# Patient Record
Sex: FEMALE | Race: WHITE | NOT HISPANIC OR LATINO | Employment: OTHER | URBAN - METROPOLITAN AREA
[De-identification: names, ages, dates, MRNs, and addresses within clinical notes are randomized per-mention and may not be internally consistent; named-entity substitution may affect disease eponyms.]

---

## 2017-01-04 ENCOUNTER — COMMUNICATION - HEALTHEAST (OUTPATIENT)
Dept: FAMILY MEDICINE | Facility: CLINIC | Age: 67
End: 2017-01-04

## 2017-01-04 ENCOUNTER — OFFICE VISIT - HEALTHEAST (OUTPATIENT)
Dept: FAMILY MEDICINE | Facility: CLINIC | Age: 67
End: 2017-01-04

## 2017-01-04 DIAGNOSIS — Z48.02 VISIT FOR SUTURE REMOVAL: ICD-10-CM

## 2017-01-10 ENCOUNTER — COMMUNICATION - HEALTHEAST (OUTPATIENT)
Dept: NURSING | Facility: CLINIC | Age: 67
End: 2017-01-10

## 2017-01-10 ENCOUNTER — COMMUNICATION - HEALTHEAST (OUTPATIENT)
Dept: FAMILY MEDICINE | Facility: CLINIC | Age: 67
End: 2017-01-10

## 2017-01-10 DIAGNOSIS — F33.9 MAJOR DEPRESSIVE DISORDER, RECURRENT EPISODE, UNSPECIFIED: ICD-10-CM

## 2017-01-10 DIAGNOSIS — E11.65 TYPE 2 DIABETES MELLITUS WITH HYPERGLYCEMIA (H): ICD-10-CM

## 2017-01-10 DIAGNOSIS — R63.4 LOSS OF WEIGHT: ICD-10-CM

## 2017-01-10 DIAGNOSIS — I48.91 ATRIAL FIBRILLATION (H): ICD-10-CM

## 2017-01-20 ENCOUNTER — OFFICE VISIT - HEALTHEAST (OUTPATIENT)
Dept: FAMILY MEDICINE | Facility: CLINIC | Age: 67
End: 2017-01-20

## 2017-01-20 DIAGNOSIS — M54.6 TRIGGER POINT OF THORACIC REGION: ICD-10-CM

## 2017-01-20 DIAGNOSIS — J44.0 CHRONIC OBSTRUCTIVE PULMONARY DISEASE WITH ACUTE LOWER RESPIRATORY INFECTION (H): ICD-10-CM

## 2017-01-20 DIAGNOSIS — G89.4 CHRONIC PAIN SYNDROME: ICD-10-CM

## 2017-01-20 DIAGNOSIS — M19.019 OSTEOARTHRITIS OF SHOULDER: ICD-10-CM

## 2017-01-20 DIAGNOSIS — J40 BRONCHITIS: ICD-10-CM

## 2017-01-22 ENCOUNTER — COMMUNICATION - HEALTHEAST (OUTPATIENT)
Dept: CARDIOLOGY | Facility: CLINIC | Age: 67
End: 2017-01-22

## 2017-01-22 ENCOUNTER — COMMUNICATION - HEALTHEAST (OUTPATIENT)
Dept: FAMILY MEDICINE | Facility: CLINIC | Age: 67
End: 2017-01-22

## 2017-01-22 DIAGNOSIS — I25.10 CORONARY ATHEROSCLEROSIS: ICD-10-CM

## 2017-01-22 DIAGNOSIS — K21.9 GERD (GASTROESOPHAGEAL REFLUX DISEASE): ICD-10-CM

## 2017-01-30 ENCOUNTER — COMMUNICATION - HEALTHEAST (OUTPATIENT)
Dept: FAMILY MEDICINE | Facility: CLINIC | Age: 67
End: 2017-01-30

## 2017-01-30 DIAGNOSIS — J44.1 COPD EXACERBATION (H): ICD-10-CM

## 2017-02-08 ENCOUNTER — COMMUNICATION - HEALTHEAST (OUTPATIENT)
Dept: FAMILY MEDICINE | Facility: CLINIC | Age: 67
End: 2017-02-08

## 2017-02-08 ENCOUNTER — COMMUNICATION - HEALTHEAST (OUTPATIENT)
Dept: NURSING | Facility: CLINIC | Age: 67
End: 2017-02-08

## 2017-02-08 DIAGNOSIS — I48.0 PAROXYSMAL ATRIAL FIBRILLATION (H): ICD-10-CM

## 2017-02-08 DIAGNOSIS — J44.0 CHRONIC OBSTRUCTIVE PULMONARY DISEASE WITH ACUTE LOWER RESPIRATORY INFECTION (H): ICD-10-CM

## 2017-02-12 ENCOUNTER — COMMUNICATION - HEALTHEAST (OUTPATIENT)
Dept: FAMILY MEDICINE | Facility: CLINIC | Age: 67
End: 2017-02-12

## 2017-02-12 DIAGNOSIS — R42 VERTIGO: ICD-10-CM

## 2017-02-13 ENCOUNTER — COMMUNICATION - HEALTHEAST (OUTPATIENT)
Dept: FAMILY MEDICINE | Facility: CLINIC | Age: 67
End: 2017-02-13

## 2017-02-13 DIAGNOSIS — J44.89 CHRONIC AIRWAY OBSTRUCTION, NOT ELSEWHERE CLASSIFIED: ICD-10-CM

## 2017-02-16 ENCOUNTER — COMMUNICATION - HEALTHEAST (OUTPATIENT)
Dept: FAMILY MEDICINE | Facility: CLINIC | Age: 67
End: 2017-02-16

## 2017-02-16 DIAGNOSIS — F33.9 MAJOR DEPRESSIVE DISORDER, RECURRENT EPISODE, UNSPECIFIED: ICD-10-CM

## 2017-02-19 ENCOUNTER — COMMUNICATION - HEALTHEAST (OUTPATIENT)
Dept: CARDIOLOGY | Facility: CLINIC | Age: 67
End: 2017-02-19

## 2017-02-19 ENCOUNTER — COMMUNICATION - HEALTHEAST (OUTPATIENT)
Dept: FAMILY MEDICINE | Facility: CLINIC | Age: 67
End: 2017-02-19

## 2017-02-19 DIAGNOSIS — I48.0 PAROXYSMAL ATRIAL FIBRILLATION (H): ICD-10-CM

## 2017-02-19 DIAGNOSIS — I25.10 CORONARY ATHEROSCLEROSIS: ICD-10-CM

## 2017-02-19 DIAGNOSIS — I48.92 ATRIAL FLUTTER, UNSPECIFIED TYPE (H): ICD-10-CM

## 2017-02-21 ENCOUNTER — OFFICE VISIT - HEALTHEAST (OUTPATIENT)
Dept: FAMILY MEDICINE | Facility: CLINIC | Age: 67
End: 2017-02-21

## 2017-02-21 ENCOUNTER — COMMUNICATION - HEALTHEAST (OUTPATIENT)
Dept: LAB | Facility: CLINIC | Age: 67
End: 2017-02-21

## 2017-02-21 DIAGNOSIS — E11.65 TYPE 2 DIABETES MELLITUS WITH HYPERGLYCEMIA (H): ICD-10-CM

## 2017-02-21 DIAGNOSIS — E87.6 HYPOPOTASSEMIA: ICD-10-CM

## 2017-02-21 DIAGNOSIS — G89.4 CHRONIC PAIN SYNDROME: ICD-10-CM

## 2017-02-21 DIAGNOSIS — I48.0 PAROXYSMAL ATRIAL FIBRILLATION (H): ICD-10-CM

## 2017-02-21 DIAGNOSIS — M54.6 TRIGGER POINT OF THORACIC REGION: ICD-10-CM

## 2017-02-21 DIAGNOSIS — I10 ESSENTIAL HYPERTENSION WITH GOAL BLOOD PRESSURE LESS THAN 140/90: ICD-10-CM

## 2017-02-21 DIAGNOSIS — E78.2 MIXED HYPERLIPIDEMIA: ICD-10-CM

## 2017-02-21 DIAGNOSIS — R35.0 URINARY FREQUENCY: ICD-10-CM

## 2017-02-21 LAB
CHOLEST SERPL-MCNC: 201 MG/DL
FASTING STATUS PATIENT QL REPORTED: YES
HBA1C MFR BLD: 7 % (ref 3.5–6)
HDLC SERPL-MCNC: 81 MG/DL
LDLC SERPL CALC-MCNC: 91 MG/DL
TRIGL SERPL-MCNC: 143 MG/DL

## 2017-02-27 ENCOUNTER — COMMUNICATION - HEALTHEAST (OUTPATIENT)
Dept: FAMILY MEDICINE | Facility: CLINIC | Age: 67
End: 2017-02-27

## 2017-03-07 ENCOUNTER — COMMUNICATION - HEALTHEAST (OUTPATIENT)
Dept: NURSING | Facility: CLINIC | Age: 67
End: 2017-03-07

## 2017-03-16 ENCOUNTER — AMBULATORY - HEALTHEAST (OUTPATIENT)
Dept: LAB | Facility: CLINIC | Age: 67
End: 2017-03-16

## 2017-03-16 ENCOUNTER — COMMUNICATION - HEALTHEAST (OUTPATIENT)
Dept: NURSING | Facility: CLINIC | Age: 67
End: 2017-03-16

## 2017-03-16 DIAGNOSIS — I48.0 PAROXYSMAL ATRIAL FIBRILLATION (H): ICD-10-CM

## 2017-03-16 DIAGNOSIS — I48.91 ATRIAL FIBRILLATION (H): ICD-10-CM

## 2017-03-19 ENCOUNTER — COMMUNICATION - HEALTHEAST (OUTPATIENT)
Dept: FAMILY MEDICINE | Facility: CLINIC | Age: 67
End: 2017-03-19

## 2017-03-19 DIAGNOSIS — F33.9 MAJOR DEPRESSIVE DISORDER, RECURRENT EPISODE, UNSPECIFIED: ICD-10-CM

## 2017-03-21 ENCOUNTER — OFFICE VISIT - HEALTHEAST (OUTPATIENT)
Dept: FAMILY MEDICINE | Facility: CLINIC | Age: 67
End: 2017-03-21

## 2017-03-21 ENCOUNTER — COMMUNICATION - HEALTHEAST (OUTPATIENT)
Dept: NURSING | Facility: CLINIC | Age: 67
End: 2017-03-21

## 2017-03-21 DIAGNOSIS — M79.7 FIBROMYALGIA: ICD-10-CM

## 2017-03-21 DIAGNOSIS — M25.519 SHOULDER PAIN: ICD-10-CM

## 2017-03-21 DIAGNOSIS — M54.6 TRIGGER POINT OF THORACIC REGION: ICD-10-CM

## 2017-03-21 DIAGNOSIS — G89.4 CHRONIC PAIN SYNDROME: ICD-10-CM

## 2017-03-21 DIAGNOSIS — I48.0 PAROXYSMAL ATRIAL FIBRILLATION (H): ICD-10-CM

## 2017-04-01 ENCOUNTER — RECORDS - HEALTHEAST (OUTPATIENT)
Dept: ADMINISTRATIVE | Facility: OTHER | Age: 67
End: 2017-04-01

## 2017-04-03 ENCOUNTER — RECORDS - HEALTHEAST (OUTPATIENT)
Dept: ADMINISTRATIVE | Facility: OTHER | Age: 67
End: 2017-04-03

## 2017-04-04 ENCOUNTER — COMMUNICATION - HEALTHEAST (OUTPATIENT)
Dept: FAMILY MEDICINE | Facility: CLINIC | Age: 67
End: 2017-04-04

## 2017-04-07 ENCOUNTER — COMMUNICATION - HEALTHEAST (OUTPATIENT)
Dept: FAMILY MEDICINE | Facility: CLINIC | Age: 67
End: 2017-04-07

## 2017-04-07 DIAGNOSIS — J44.89 CHRONIC AIRWAY OBSTRUCTION, NOT ELSEWHERE CLASSIFIED: ICD-10-CM

## 2017-04-07 DIAGNOSIS — J44.1 COPD EXACERBATION (H): ICD-10-CM

## 2017-04-07 DIAGNOSIS — F17.200 TOBACCO USE DISORDER: ICD-10-CM

## 2017-04-07 DIAGNOSIS — K27.9 PEPTIC ULCER: ICD-10-CM

## 2017-04-07 DIAGNOSIS — K21.00 REFLUX ESOPHAGITIS: ICD-10-CM

## 2017-04-11 ENCOUNTER — COMMUNICATION - HEALTHEAST (OUTPATIENT)
Dept: FAMILY MEDICINE | Facility: CLINIC | Age: 67
End: 2017-04-11

## 2017-04-11 DIAGNOSIS — I48.0 PAROXYSMAL ATRIAL FIBRILLATION (H): ICD-10-CM

## 2017-04-12 ENCOUNTER — COMMUNICATION - HEALTHEAST (OUTPATIENT)
Dept: FAMILY MEDICINE | Facility: CLINIC | Age: 67
End: 2017-04-12

## 2017-04-12 DIAGNOSIS — R60.9 EDEMA: ICD-10-CM

## 2017-04-19 ENCOUNTER — RECORDS - HEALTHEAST (OUTPATIENT)
Dept: ADMINISTRATIVE | Facility: OTHER | Age: 67
End: 2017-04-19

## 2017-04-19 ENCOUNTER — COMMUNICATION - HEALTHEAST (OUTPATIENT)
Dept: NURSING | Facility: CLINIC | Age: 67
End: 2017-04-19

## 2017-04-19 ENCOUNTER — OFFICE VISIT - HEALTHEAST (OUTPATIENT)
Dept: FAMILY MEDICINE | Facility: CLINIC | Age: 67
End: 2017-04-19

## 2017-04-19 DIAGNOSIS — I48.0 PAROXYSMAL ATRIAL FIBRILLATION (H): ICD-10-CM

## 2017-04-19 DIAGNOSIS — M16.0 PRIMARY OSTEOARTHRITIS OF BOTH HIPS: ICD-10-CM

## 2017-04-19 DIAGNOSIS — M54.6 TRIGGER POINT OF THORACIC REGION: ICD-10-CM

## 2017-04-19 DIAGNOSIS — M79.7 FIBROMYALGIA: ICD-10-CM

## 2017-04-19 DIAGNOSIS — M17.0 PRIMARY OSTEOARTHRITIS OF BOTH KNEES: ICD-10-CM

## 2017-04-19 DIAGNOSIS — G89.4 CHRONIC PAIN SYNDROME: ICD-10-CM

## 2017-04-23 ENCOUNTER — COMMUNICATION - HEALTHEAST (OUTPATIENT)
Dept: FAMILY MEDICINE | Facility: CLINIC | Age: 67
End: 2017-04-23

## 2017-04-23 DIAGNOSIS — F33.9 MAJOR DEPRESSIVE DISORDER, RECURRENT EPISODE, UNSPECIFIED: ICD-10-CM

## 2017-04-24 ENCOUNTER — COMMUNICATION - HEALTHEAST (OUTPATIENT)
Dept: FAMILY MEDICINE | Facility: CLINIC | Age: 67
End: 2017-04-24

## 2017-05-05 ENCOUNTER — COMMUNICATION - HEALTHEAST (OUTPATIENT)
Dept: FAMILY MEDICINE | Facility: CLINIC | Age: 67
End: 2017-05-05

## 2017-05-05 DIAGNOSIS — J44.0 CHRONIC OBSTRUCTIVE PULMONARY DISEASE WITH ACUTE LOWER RESPIRATORY INFECTION (H): ICD-10-CM

## 2017-05-05 DIAGNOSIS — E11.9 DM (DIABETES MELLITUS) (H): ICD-10-CM

## 2017-05-05 DIAGNOSIS — J44.89 CHRONIC AIRWAY OBSTRUCTION, NOT ELSEWHERE CLASSIFIED: ICD-10-CM

## 2017-05-05 DIAGNOSIS — K21.9 GERD (GASTROESOPHAGEAL REFLUX DISEASE): ICD-10-CM

## 2017-05-10 ENCOUNTER — RECORDS - HEALTHEAST (OUTPATIENT)
Dept: ADMINISTRATIVE | Facility: OTHER | Age: 67
End: 2017-05-10

## 2017-05-10 ENCOUNTER — COMMUNICATION - HEALTHEAST (OUTPATIENT)
Dept: FAMILY MEDICINE | Facility: CLINIC | Age: 67
End: 2017-05-10

## 2017-05-19 ENCOUNTER — COMMUNICATION - HEALTHEAST (OUTPATIENT)
Dept: INTERNAL MEDICINE | Facility: CLINIC | Age: 67
End: 2017-05-19

## 2017-05-19 ENCOUNTER — OFFICE VISIT - HEALTHEAST (OUTPATIENT)
Dept: FAMILY MEDICINE | Facility: CLINIC | Age: 67
End: 2017-05-19

## 2017-05-19 ENCOUNTER — RECORDS - HEALTHEAST (OUTPATIENT)
Dept: ADMINISTRATIVE | Facility: OTHER | Age: 67
End: 2017-05-19

## 2017-05-19 DIAGNOSIS — M54.6 TRIGGER POINT OF THORACIC REGION: ICD-10-CM

## 2017-05-19 DIAGNOSIS — R42 VERTIGO: ICD-10-CM

## 2017-05-19 DIAGNOSIS — I48.0 PAROXYSMAL ATRIAL FIBRILLATION (H): ICD-10-CM

## 2017-05-19 DIAGNOSIS — E11.40 DM NEUROPATHY, TYPE II DIABETES MELLITUS (H): ICD-10-CM

## 2017-05-19 DIAGNOSIS — G89.4 CHRONIC PAIN SYNDROME: ICD-10-CM

## 2017-05-19 ASSESSMENT — MIFFLIN-ST. JEOR: SCORE: 1449.15

## 2017-05-22 ENCOUNTER — RECORDS - HEALTHEAST (OUTPATIENT)
Dept: ADMINISTRATIVE | Facility: OTHER | Age: 67
End: 2017-05-22

## 2017-05-23 ENCOUNTER — COMMUNICATION - HEALTHEAST (OUTPATIENT)
Dept: FAMILY MEDICINE | Facility: CLINIC | Age: 67
End: 2017-05-23

## 2017-05-23 ENCOUNTER — RECORDS - HEALTHEAST (OUTPATIENT)
Dept: ADMINISTRATIVE | Facility: OTHER | Age: 67
End: 2017-05-23

## 2017-05-23 ENCOUNTER — OFFICE VISIT - HEALTHEAST (OUTPATIENT)
Dept: CARDIOLOGY | Facility: CLINIC | Age: 67
End: 2017-05-23

## 2017-05-23 DIAGNOSIS — I48.0 PAROXYSMAL ATRIAL FIBRILLATION (H): ICD-10-CM

## 2017-05-23 DIAGNOSIS — I10 ESSENTIAL HYPERTENSION WITH GOAL BLOOD PRESSURE LESS THAN 140/90: ICD-10-CM

## 2017-05-23 DIAGNOSIS — I35.0 MODERATE AORTIC STENOSIS: ICD-10-CM

## 2017-05-23 DIAGNOSIS — J44.9 COPD (CHRONIC OBSTRUCTIVE PULMONARY DISEASE) (H): ICD-10-CM

## 2017-05-23 DIAGNOSIS — I35.9 AORTIC VALVE DISORDER: ICD-10-CM

## 2017-05-23 DIAGNOSIS — I48.92 ATRIAL FLUTTER, UNSPECIFIED TYPE (H): ICD-10-CM

## 2017-05-23 ASSESSMENT — MIFFLIN-ST. JEOR: SCORE: 1433.28

## 2017-05-31 ENCOUNTER — COMMUNICATION - HEALTHEAST (OUTPATIENT)
Dept: FAMILY MEDICINE | Facility: CLINIC | Age: 67
End: 2017-05-31

## 2017-06-02 ENCOUNTER — COMMUNICATION - HEALTHEAST (OUTPATIENT)
Dept: FAMILY MEDICINE | Facility: CLINIC | Age: 67
End: 2017-06-02

## 2017-06-02 DIAGNOSIS — I48.0 PAROXYSMAL ATRIAL FIBRILLATION (H): ICD-10-CM

## 2017-06-09 ENCOUNTER — COMMUNICATION - HEALTHEAST (OUTPATIENT)
Dept: FAMILY MEDICINE | Facility: CLINIC | Age: 67
End: 2017-06-09

## 2017-06-09 ENCOUNTER — COMMUNICATION - HEALTHEAST (OUTPATIENT)
Dept: NURSING | Facility: CLINIC | Age: 67
End: 2017-06-09

## 2017-06-09 DIAGNOSIS — E11.40 DM NEUROPATHY, TYPE II DIABETES MELLITUS (H): ICD-10-CM

## 2017-06-09 DIAGNOSIS — E11.9 TYPE 2 DIABETES MELLITUS (H): ICD-10-CM

## 2017-06-11 ENCOUNTER — COMMUNICATION - HEALTHEAST (OUTPATIENT)
Dept: FAMILY MEDICINE | Facility: CLINIC | Age: 67
End: 2017-06-11

## 2017-06-11 DIAGNOSIS — Z78.0 MENOPAUSE: ICD-10-CM

## 2017-06-13 ENCOUNTER — COMMUNICATION - HEALTHEAST (OUTPATIENT)
Dept: FAMILY MEDICINE | Facility: CLINIC | Age: 67
End: 2017-06-13

## 2017-06-13 DIAGNOSIS — E11.9 TYPE 2 DIABETES MELLITUS (H): ICD-10-CM

## 2017-06-19 ENCOUNTER — COMMUNICATION - HEALTHEAST (OUTPATIENT)
Dept: NURSING | Facility: CLINIC | Age: 67
End: 2017-06-19

## 2017-06-19 ENCOUNTER — OFFICE VISIT - HEALTHEAST (OUTPATIENT)
Dept: FAMILY MEDICINE | Facility: CLINIC | Age: 67
End: 2017-06-19

## 2017-06-19 DIAGNOSIS — I48.91 ATRIAL FIBRILLATION (H): ICD-10-CM

## 2017-06-19 DIAGNOSIS — M54.6 TRIGGER POINT OF THORACIC REGION: ICD-10-CM

## 2017-06-19 DIAGNOSIS — I48.0 PAROXYSMAL ATRIAL FIBRILLATION (H): ICD-10-CM

## 2017-06-19 DIAGNOSIS — J44.89 CHRONIC AIRWAY OBSTRUCTION, NOT ELSEWHERE CLASSIFIED: ICD-10-CM

## 2017-06-19 DIAGNOSIS — K21.00 REFLUX ESOPHAGITIS: ICD-10-CM

## 2017-06-19 DIAGNOSIS — K21.9 GERD (GASTROESOPHAGEAL REFLUX DISEASE): ICD-10-CM

## 2017-06-19 DIAGNOSIS — G89.4 CHRONIC PAIN SYNDROME: ICD-10-CM

## 2017-06-19 DIAGNOSIS — E11.9 TYPE 2 DIABETES MELLITUS (H): ICD-10-CM

## 2017-06-23 ENCOUNTER — COMMUNICATION - HEALTHEAST (OUTPATIENT)
Dept: FAMILY MEDICINE | Facility: CLINIC | Age: 67
End: 2017-06-23

## 2017-06-23 DIAGNOSIS — I48.91 ATRIAL FIBRILLATION (H): ICD-10-CM

## 2017-07-13 ENCOUNTER — COMMUNICATION - HEALTHEAST (OUTPATIENT)
Dept: FAMILY MEDICINE | Facility: CLINIC | Age: 67
End: 2017-07-13

## 2017-07-13 DIAGNOSIS — E11.40 DM NEUROPATHY, TYPE II DIABETES MELLITUS (H): ICD-10-CM

## 2017-07-18 ENCOUNTER — COMMUNICATION - HEALTHEAST (OUTPATIENT)
Dept: FAMILY MEDICINE | Facility: CLINIC | Age: 67
End: 2017-07-18

## 2017-07-18 DIAGNOSIS — E11.40 DM NEUROPATHY, TYPE II DIABETES MELLITUS (H): ICD-10-CM

## 2017-07-19 ENCOUNTER — OFFICE VISIT - HEALTHEAST (OUTPATIENT)
Dept: FAMILY MEDICINE | Facility: CLINIC | Age: 67
End: 2017-07-19

## 2017-07-19 ENCOUNTER — COMMUNICATION - HEALTHEAST (OUTPATIENT)
Dept: NURSING | Facility: CLINIC | Age: 67
End: 2017-07-19

## 2017-07-19 DIAGNOSIS — I48.0 PAROXYSMAL ATRIAL FIBRILLATION (H): ICD-10-CM

## 2017-07-19 DIAGNOSIS — M54.6 TRIGGER POINT OF THORACIC REGION: ICD-10-CM

## 2017-07-19 DIAGNOSIS — M21.619 BUNION: ICD-10-CM

## 2017-07-19 DIAGNOSIS — G89.4 CHRONIC PAIN SYNDROME: ICD-10-CM

## 2017-07-19 DIAGNOSIS — L84 CALLUS OF FOOT: ICD-10-CM

## 2017-07-19 DIAGNOSIS — I48.91 ATRIAL FIBRILLATION (H): ICD-10-CM

## 2017-07-19 DIAGNOSIS — E11.40 DM NEUROPATHY, TYPE II DIABETES MELLITUS (H): ICD-10-CM

## 2017-07-19 DIAGNOSIS — H10.31 ACUTE BACTERIAL CONJUNCTIVITIS OF RIGHT EYE: ICD-10-CM

## 2017-07-19 DIAGNOSIS — J44.9 COPD (CHRONIC OBSTRUCTIVE PULMONARY DISEASE) (H): ICD-10-CM

## 2017-08-08 ENCOUNTER — COMMUNICATION - HEALTHEAST (OUTPATIENT)
Dept: FAMILY MEDICINE | Facility: CLINIC | Age: 67
End: 2017-08-08

## 2017-08-08 DIAGNOSIS — J44.0 CHRONIC OBSTRUCTIVE PULMONARY DISEASE WITH ACUTE LOWER RESPIRATORY INFECTION (H): ICD-10-CM

## 2017-08-21 ENCOUNTER — RECORDS - HEALTHEAST (OUTPATIENT)
Dept: ADMINISTRATIVE | Facility: OTHER | Age: 67
End: 2017-08-21

## 2017-08-21 ENCOUNTER — COMMUNICATION - HEALTHEAST (OUTPATIENT)
Dept: FAMILY MEDICINE | Facility: CLINIC | Age: 67
End: 2017-08-21

## 2017-08-22 ENCOUNTER — OFFICE VISIT - HEALTHEAST (OUTPATIENT)
Dept: FAMILY MEDICINE | Facility: CLINIC | Age: 67
End: 2017-08-22

## 2017-08-22 ENCOUNTER — COMMUNICATION - HEALTHEAST (OUTPATIENT)
Dept: INTERNAL MEDICINE | Facility: CLINIC | Age: 67
End: 2017-08-22

## 2017-08-22 DIAGNOSIS — I48.91 ATRIAL FIBRILLATION (H): ICD-10-CM

## 2017-08-22 DIAGNOSIS — M54.6 TRIGGER POINT OF THORACIC REGION: ICD-10-CM

## 2017-08-22 DIAGNOSIS — Z12.31 VISIT FOR SCREENING MAMMOGRAM: ICD-10-CM

## 2017-08-22 DIAGNOSIS — G89.4 CHRONIC PAIN SYNDROME: ICD-10-CM

## 2017-08-22 DIAGNOSIS — I48.0 PAROXYSMAL ATRIAL FIBRILLATION (H): ICD-10-CM

## 2017-08-22 ASSESSMENT — MIFFLIN-ST. JEOR: SCORE: 1419.67

## 2017-08-31 ENCOUNTER — COMMUNICATION - HEALTHEAST (OUTPATIENT)
Dept: FAMILY MEDICINE | Facility: CLINIC | Age: 67
End: 2017-08-31

## 2017-08-31 ENCOUNTER — HOSPITAL ENCOUNTER (OUTPATIENT)
Dept: CARDIOLOGY | Facility: CLINIC | Age: 67
Discharge: HOME | End: 2017-08-31
Attending: NURSE PRACTITIONER

## 2017-08-31 DIAGNOSIS — I35.9 AORTIC VALVE DISORDER: ICD-10-CM

## 2017-08-31 LAB
AORTIC ROOT: 3.3 CM
AORTIC VALVE MEAN VELOCITY: 248 CM/S
AR DECEL SLOPE: 3980 MM/S2
AR PEAK VELOCITY: 454 CM/S
AV DIMENSIONLESS INDEX VTI: 0.3
AV MEAN GRADIENT: 28 MMHG
AV PEAK GRADIENT: 45.7 MMHG
AV REGURGITANT PEAK GRADIENT: 82.4 MMHG
AV REGURGITATION PRESSURE HALF TIME: 343 MS
AV VALVE AREA: 0.9 CM2
AV VELOCITY RATIO: 0.4
BSA FOR ECHO PROCEDURE: 2.03 M2
CV BLOOD PRESSURE: NORMAL MMHG
CV ECHO HEIGHT: 64.5 IN
CV ECHO WEIGHT: 201 LBS
DOP CALC AO PEAK VEL: 338 CM/S
DOP CALC AO VTI: 84.7 CM
DOP CALC LVOT AREA: 3.14 CM2
DOP CALC LVOT DIAMETER: 2 CM
DOP CALC LVOT PEAK VEL: 127 CM/S
DOP CALC LVOT STROKE VOLUME: 77.2 CM3
DOP CALC MV VTI: 32.9 CM
DOP CALCLVOT PEAK VEL VTI: 24.6 CM
EJECTION FRACTION: 60 % (ref 55–75)
FRACTIONAL SHORTENING: 35.4 % (ref 28–44)
INTERVENTRICULAR SEPTUM IN END DIASTOLE: 1.61 CM (ref 0.6–0.9)
IVS/PW RATIO: 1.8
LA AREA 2: 16.9 CM2
LEFT ATRIUM LENGTH: 4.9 CM
LEFT ATRIUM SIZE: 3.7 CM
LEFT VENTRICLE CARDIAC INDEX: 3.3 L/MIN/M2
LEFT VENTRICLE CARDIAC OUTPUT: 6.6 L/MIN
LEFT VENTRICLE DIASTOLIC VOLUME INDEX: 44.8 CM3/M2 (ref 34–74)
LEFT VENTRICLE DIASTOLIC VOLUME: 91 CM3 (ref 46–106)
LEFT VENTRICLE HEART RATE: 86 BPM
LEFT VENTRICLE MASS INDEX: 98.9 G/M2
LEFT VENTRICLE SYSTOLIC VOLUME INDEX: 17.7 CM3/M2 (ref 11–31)
LEFT VENTRICLE SYSTOLIC VOLUME: 36 CM3 (ref 14–42)
LEFT VENTRICULAR INTERNAL DIMENSION IN DIASTOLE: 4.32 CM (ref 3.8–5.2)
LEFT VENTRICULAR INTERNAL DIMENSION IN SYSTOLE: 2.79 CM (ref 2.2–3.5)
LEFT VENTRICULAR MASS: 200.8 G
LEFT VENTRICULAR OUTFLOW TRACT MEAN GRADIENT: 4 MMHG
LEFT VENTRICULAR OUTFLOW TRACT MEAN VELOCITY: 95.7 CM/S
LEFT VENTRICULAR OUTFLOW TRACT PEAK GRADIENT: 6 MMHG
LEFT VENTRICULAR POSTERIOR WALL IN END DIASTOLE: 0.92 CM (ref 0.6–0.9)
LV STROKE VOLUME INDEX: 38.1 ML/M2
MITRAL VALVE DECELERATION SLOPE: 4670 MM/S2
MITRAL VALVE E/A RATIO: 1.1
MITRAL VALVE MEAN INFLOW VELOCITY: 107 CM/S
MITRAL VALVE PEAK VELOCITY: 149 CM/S
MITRAL VALVE PRESSURE HALF-TIME: 95 MS
MV AREA VTI: 2.35 CM2
MV AVERAGE E/E' RATIO: 13.1 CM/S
MV DECELERATION TIME: 169 MS
MV E'TISSUE VEL-LAT: 8.29 CM/S
MV E'TISSUE VEL-MED: 6.92 CM/S
MV LATERAL E/E' RATIO: 12
MV MEDIAL E/E' RATIO: 14.4
MV PEAK A VELOCITY: 88.3 CM/S
MV PEAK E VELOCITY: 99.8 CM/S
MV PEAK GRADIENT: 8.9 MMHG
MV VALVE AREA BY CONTINUITY EQUATION: 2.3 CM2
MV VALVE AREA PRESSURE 1/2 METHOD: 2.3 CM2
NUC REST DIASTOLIC VOLUME INDEX: 3216 LBS
NUC REST SYSTOLIC VOLUME INDEX: 64.5 IN
TRICUSPID VALVE ANULAR PLANE SYSTOLIC EXCURSION: 2 CM

## 2017-08-31 ASSESSMENT — MIFFLIN-ST. JEOR: SCORE: 1419.67

## 2017-09-06 ENCOUNTER — COMMUNICATION - HEALTHEAST (OUTPATIENT)
Dept: CARDIOLOGY | Facility: CLINIC | Age: 67
End: 2017-09-06

## 2017-09-12 ENCOUNTER — COMMUNICATION - HEALTHEAST (OUTPATIENT)
Dept: CARDIOLOGY | Facility: CLINIC | Age: 67
End: 2017-09-12

## 2017-09-12 DIAGNOSIS — I48.92 ATRIAL FLUTTER, UNSPECIFIED TYPE (H): ICD-10-CM

## 2017-09-22 ENCOUNTER — AMBULATORY - HEALTHEAST (OUTPATIENT)
Dept: FAMILY MEDICINE | Facility: CLINIC | Age: 67
End: 2017-09-22

## 2017-09-22 ENCOUNTER — COMMUNICATION - HEALTHEAST (OUTPATIENT)
Dept: NURSING | Facility: CLINIC | Age: 67
End: 2017-09-22

## 2017-09-22 ENCOUNTER — OFFICE VISIT - HEALTHEAST (OUTPATIENT)
Dept: FAMILY MEDICINE | Facility: CLINIC | Age: 67
End: 2017-09-22

## 2017-09-22 DIAGNOSIS — E11.40 TYPE 2 DIABETES MELLITUS WITH DIABETIC NEUROPATHY, WITHOUT LONG-TERM CURRENT USE OF INSULIN (H): ICD-10-CM

## 2017-09-22 DIAGNOSIS — I48.0 PAROXYSMAL ATRIAL FIBRILLATION (H): ICD-10-CM

## 2017-09-22 DIAGNOSIS — J44.1 COPD EXACERBATION (H): ICD-10-CM

## 2017-09-22 DIAGNOSIS — I48.91 ATRIAL FIBRILLATION (H): ICD-10-CM

## 2017-09-22 DIAGNOSIS — M54.6 TRIGGER POINT OF THORACIC REGION: ICD-10-CM

## 2017-09-22 DIAGNOSIS — G89.4 CHRONIC PAIN SYNDROME: ICD-10-CM

## 2017-09-22 DIAGNOSIS — M25.519 SHOULDER PAIN: ICD-10-CM

## 2017-09-22 DIAGNOSIS — M79.7 FIBROMYALGIA: ICD-10-CM

## 2017-09-25 ENCOUNTER — COMMUNICATION - HEALTHEAST (OUTPATIENT)
Dept: FAMILY MEDICINE | Facility: CLINIC | Age: 67
End: 2017-09-25

## 2017-10-05 ENCOUNTER — COMMUNICATION - HEALTHEAST (OUTPATIENT)
Dept: FAMILY MEDICINE | Facility: CLINIC | Age: 67
End: 2017-10-05

## 2017-10-05 DIAGNOSIS — R10.13 EPIGASTRIC ABDOMINAL PAIN: ICD-10-CM

## 2017-10-05 DIAGNOSIS — A04.8 H. PYLORI INFECTION: ICD-10-CM

## 2017-10-10 ENCOUNTER — AMBULATORY - HEALTHEAST (OUTPATIENT)
Dept: FAMILY MEDICINE | Facility: CLINIC | Age: 67
End: 2017-10-10

## 2017-10-10 ENCOUNTER — COMMUNICATION - HEALTHEAST (OUTPATIENT)
Dept: FAMILY MEDICINE | Facility: CLINIC | Age: 67
End: 2017-10-10

## 2017-10-10 DIAGNOSIS — R10.13 EPIGASTRIC ABDOMINAL PAIN: ICD-10-CM

## 2017-10-10 DIAGNOSIS — I48.0 PAROXYSMAL ATRIAL FIBRILLATION (H): ICD-10-CM

## 2017-10-10 DIAGNOSIS — R60.9 EDEMA: ICD-10-CM

## 2017-10-20 ENCOUNTER — COMMUNICATION - HEALTHEAST (OUTPATIENT)
Dept: NURSING | Facility: CLINIC | Age: 67
End: 2017-10-20

## 2017-10-20 ENCOUNTER — OFFICE VISIT - HEALTHEAST (OUTPATIENT)
Dept: FAMILY MEDICINE | Facility: CLINIC | Age: 67
End: 2017-10-20

## 2017-10-20 DIAGNOSIS — I48.0 PAROXYSMAL ATRIAL FIBRILLATION (H): ICD-10-CM

## 2017-10-20 DIAGNOSIS — Z12.39 BREAST CANCER SCREENING: ICD-10-CM

## 2017-10-20 DIAGNOSIS — M79.7 FIBROMYALGIA: ICD-10-CM

## 2017-10-20 DIAGNOSIS — I48.91 ATRIAL FIBRILLATION (H): ICD-10-CM

## 2017-10-20 DIAGNOSIS — G89.4 CHRONIC PAIN SYNDROME: ICD-10-CM

## 2017-10-20 DIAGNOSIS — Z12.31 SCREENING MAMMOGRAM, ENCOUNTER FOR: ICD-10-CM

## 2017-11-06 ENCOUNTER — AMBULATORY - HEALTHEAST (OUTPATIENT)
Dept: FAMILY MEDICINE | Facility: CLINIC | Age: 67
End: 2017-11-06

## 2017-11-06 DIAGNOSIS — H26.9 CATARACT: ICD-10-CM

## 2017-11-07 ENCOUNTER — COMMUNICATION - HEALTHEAST (OUTPATIENT)
Dept: FAMILY MEDICINE | Facility: CLINIC | Age: 67
End: 2017-11-07

## 2017-11-07 DIAGNOSIS — E78.2 MIXED HYPERLIPIDEMIA: ICD-10-CM

## 2017-11-17 ENCOUNTER — OFFICE VISIT - HEALTHEAST (OUTPATIENT)
Dept: FAMILY MEDICINE | Facility: CLINIC | Age: 67
End: 2017-11-17

## 2017-11-17 ENCOUNTER — COMMUNICATION - HEALTHEAST (OUTPATIENT)
Dept: NURSING | Facility: CLINIC | Age: 67
End: 2017-11-17

## 2017-11-17 DIAGNOSIS — M79.7 FIBROMYALGIA: ICD-10-CM

## 2017-11-17 DIAGNOSIS — I48.0 PAROXYSMAL ATRIAL FIBRILLATION (H): ICD-10-CM

## 2017-11-17 DIAGNOSIS — M25.519 SHOULDER PAIN: ICD-10-CM

## 2017-11-17 DIAGNOSIS — I48.91 ATRIAL FIBRILLATION (H): ICD-10-CM

## 2017-11-17 DIAGNOSIS — G89.4 CHRONIC PAIN SYNDROME: ICD-10-CM

## 2017-11-17 ASSESSMENT — MIFFLIN-ST. JEOR: SCORE: 1402.66

## 2017-11-29 ENCOUNTER — RECORDS - HEALTHEAST (OUTPATIENT)
Dept: ADMINISTRATIVE | Facility: OTHER | Age: 67
End: 2017-11-29

## 2017-12-08 ENCOUNTER — COMMUNICATION - HEALTHEAST (OUTPATIENT)
Dept: NURSING | Facility: CLINIC | Age: 67
End: 2017-12-08

## 2017-12-11 ENCOUNTER — COMMUNICATION - HEALTHEAST (OUTPATIENT)
Dept: FAMILY MEDICINE | Facility: CLINIC | Age: 67
End: 2017-12-11

## 2017-12-11 DIAGNOSIS — R42 VERTIGO: ICD-10-CM

## 2017-12-13 ENCOUNTER — RECORDS - HEALTHEAST (OUTPATIENT)
Dept: ADMINISTRATIVE | Facility: OTHER | Age: 67
End: 2017-12-13

## 2017-12-19 ENCOUNTER — OFFICE VISIT - HEALTHEAST (OUTPATIENT)
Dept: FAMILY MEDICINE | Facility: CLINIC | Age: 67
End: 2017-12-19

## 2017-12-19 ENCOUNTER — COMMUNICATION - HEALTHEAST (OUTPATIENT)
Dept: NURSING | Facility: CLINIC | Age: 67
End: 2017-12-19

## 2017-12-19 DIAGNOSIS — I35.0 MODERATE AORTIC STENOSIS: ICD-10-CM

## 2017-12-19 DIAGNOSIS — I48.0 PAROXYSMAL ATRIAL FIBRILLATION (H): ICD-10-CM

## 2017-12-19 DIAGNOSIS — B37.2 CANDIDAL INTERTRIGO: ICD-10-CM

## 2017-12-19 DIAGNOSIS — E11.65 TYPE 2 DIABETES MELLITUS WITH HYPERGLYCEMIA, WITHOUT LONG-TERM CURRENT USE OF INSULIN (H): ICD-10-CM

## 2017-12-19 DIAGNOSIS — I10 ESSENTIAL HYPERTENSION: ICD-10-CM

## 2017-12-19 DIAGNOSIS — G89.4 CHRONIC PAIN SYNDROME: ICD-10-CM

## 2017-12-19 DIAGNOSIS — I48.91 ATRIAL FIBRILLATION (H): ICD-10-CM

## 2017-12-19 LAB
CHOLEST SERPL-MCNC: 201 MG/DL
FASTING STATUS PATIENT QL REPORTED: YES
HBA1C MFR BLD: 6 % (ref 3.5–6)
HDLC SERPL-MCNC: 89 MG/DL
LDLC SERPL CALC-MCNC: 88 MG/DL
TRIGL SERPL-MCNC: 119 MG/DL

## 2017-12-26 ENCOUNTER — COMMUNICATION - HEALTHEAST (OUTPATIENT)
Dept: FAMILY MEDICINE | Facility: CLINIC | Age: 67
End: 2017-12-26

## 2018-01-04 ENCOUNTER — RECORDS - HEALTHEAST (OUTPATIENT)
Dept: ADMINISTRATIVE | Facility: OTHER | Age: 68
End: 2018-01-04

## 2018-01-04 ENCOUNTER — COMMUNICATION - HEALTHEAST (OUTPATIENT)
Dept: FAMILY MEDICINE | Facility: CLINIC | Age: 68
End: 2018-01-04

## 2018-01-04 DIAGNOSIS — R63.4 LOSS OF WEIGHT: ICD-10-CM

## 2018-01-05 ENCOUNTER — COMMUNICATION - HEALTHEAST (OUTPATIENT)
Dept: NURSING | Facility: CLINIC | Age: 68
End: 2018-01-05

## 2018-01-05 DIAGNOSIS — I48.91 ATRIAL FIBRILLATION (H): ICD-10-CM

## 2018-01-08 ENCOUNTER — COMMUNICATION - HEALTHEAST (OUTPATIENT)
Dept: FAMILY MEDICINE | Facility: CLINIC | Age: 68
End: 2018-01-08

## 2018-01-08 DIAGNOSIS — R60.9 EDEMA: ICD-10-CM

## 2018-01-09 ENCOUNTER — COMMUNICATION - HEALTHEAST (OUTPATIENT)
Dept: NURSING | Facility: CLINIC | Age: 68
End: 2018-01-09

## 2018-01-19 ENCOUNTER — COMMUNICATION - HEALTHEAST (OUTPATIENT)
Dept: NURSING | Facility: CLINIC | Age: 68
End: 2018-01-19

## 2018-01-19 ENCOUNTER — OFFICE VISIT - HEALTHEAST (OUTPATIENT)
Dept: FAMILY MEDICINE | Facility: CLINIC | Age: 68
End: 2018-01-19

## 2018-01-19 ENCOUNTER — AMBULATORY - HEALTHEAST (OUTPATIENT)
Dept: FAMILY MEDICINE | Facility: CLINIC | Age: 68
End: 2018-01-19

## 2018-01-19 DIAGNOSIS — Z12.39 SCREENING BREAST EXAMINATION: ICD-10-CM

## 2018-01-19 DIAGNOSIS — G89.4 CHRONIC PAIN SYNDROME: ICD-10-CM

## 2018-01-19 DIAGNOSIS — I48.91 ATRIAL FIBRILLATION (H): ICD-10-CM

## 2018-01-19 DIAGNOSIS — M54.6 TRIGGER POINT OF THORACIC REGION: ICD-10-CM

## 2018-01-19 DIAGNOSIS — M79.7 FIBROMYALGIA: ICD-10-CM

## 2018-01-19 LAB — INR PPP: 1.5 (ref 0.9–1.1)

## 2018-01-30 ENCOUNTER — COMMUNICATION - HEALTHEAST (OUTPATIENT)
Dept: FAMILY MEDICINE | Facility: CLINIC | Age: 68
End: 2018-01-30

## 2018-01-30 DIAGNOSIS — K21.00 REFLUX ESOPHAGITIS: ICD-10-CM

## 2018-01-30 DIAGNOSIS — F17.200 TOBACCO USE DISORDER: ICD-10-CM

## 2018-01-30 DIAGNOSIS — I48.0 PAROXYSMAL ATRIAL FIBRILLATION (H): ICD-10-CM

## 2018-01-30 DIAGNOSIS — E11.65 TYPE 2 DIABETES MELLITUS WITH HYPERGLYCEMIA (H): ICD-10-CM

## 2018-01-31 ENCOUNTER — COMMUNICATION - HEALTHEAST (OUTPATIENT)
Dept: FAMILY MEDICINE | Facility: CLINIC | Age: 68
End: 2018-01-31

## 2018-01-31 DIAGNOSIS — K21.9 GERD (GASTROESOPHAGEAL REFLUX DISEASE): ICD-10-CM

## 2018-01-31 DIAGNOSIS — K21.00 REFLUX ESOPHAGITIS: ICD-10-CM

## 2018-02-06 ENCOUNTER — COMMUNICATION - HEALTHEAST (OUTPATIENT)
Dept: FAMILY MEDICINE | Facility: CLINIC | Age: 68
End: 2018-02-06

## 2018-02-06 DIAGNOSIS — E78.2 MIXED HYPERLIPIDEMIA: ICD-10-CM

## 2018-02-09 ENCOUNTER — COMMUNICATION - HEALTHEAST (OUTPATIENT)
Dept: NURSING | Facility: CLINIC | Age: 68
End: 2018-02-09

## 2018-02-19 ENCOUNTER — COMMUNICATION - HEALTHEAST (OUTPATIENT)
Dept: NURSING | Facility: CLINIC | Age: 68
End: 2018-02-19

## 2018-02-19 ENCOUNTER — OFFICE VISIT - HEALTHEAST (OUTPATIENT)
Dept: FAMILY MEDICINE | Facility: CLINIC | Age: 68
End: 2018-02-19

## 2018-02-19 DIAGNOSIS — I48.91 ATRIAL FIBRILLATION (H): ICD-10-CM

## 2018-02-19 DIAGNOSIS — K21.9 GERD (GASTROESOPHAGEAL REFLUX DISEASE): ICD-10-CM

## 2018-02-19 DIAGNOSIS — G89.4 CHRONIC PAIN SYNDROME: ICD-10-CM

## 2018-02-19 DIAGNOSIS — M54.6 TRIGGER POINT OF THORACIC REGION: ICD-10-CM

## 2018-02-19 LAB — INR PPP: 1.8 (ref 0.9–1.1)

## 2018-02-27 ENCOUNTER — COMMUNICATION - HEALTHEAST (OUTPATIENT)
Dept: ADMINISTRATIVE | Facility: CLINIC | Age: 68
End: 2018-02-27

## 2018-03-09 ENCOUNTER — COMMUNICATION - HEALTHEAST (OUTPATIENT)
Dept: CARDIOLOGY | Facility: CLINIC | Age: 68
End: 2018-03-09

## 2018-03-09 DIAGNOSIS — I48.92 ATRIAL FLUTTER, UNSPECIFIED TYPE (H): ICD-10-CM

## 2018-03-12 ENCOUNTER — COMMUNICATION - HEALTHEAST (OUTPATIENT)
Dept: NURSING | Facility: CLINIC | Age: 68
End: 2018-03-12

## 2018-03-14 ENCOUNTER — COMMUNICATION - HEALTHEAST (OUTPATIENT)
Dept: FAMILY MEDICINE | Facility: CLINIC | Age: 68
End: 2018-03-14

## 2018-03-15 ENCOUNTER — COMMUNICATION - HEALTHEAST (OUTPATIENT)
Dept: FAMILY MEDICINE | Facility: CLINIC | Age: 68
End: 2018-03-15

## 2018-03-15 DIAGNOSIS — E11.9 TYPE 2 DIABETES MELLITUS (H): ICD-10-CM

## 2018-03-16 ENCOUNTER — COMMUNICATION - HEALTHEAST (OUTPATIENT)
Dept: NURSING | Facility: CLINIC | Age: 68
End: 2018-03-16

## 2018-03-16 ENCOUNTER — COMMUNICATION - HEALTHEAST (OUTPATIENT)
Dept: FAMILY MEDICINE | Facility: CLINIC | Age: 68
End: 2018-03-16

## 2018-03-16 ENCOUNTER — OFFICE VISIT - HEALTHEAST (OUTPATIENT)
Dept: FAMILY MEDICINE | Facility: CLINIC | Age: 68
End: 2018-03-16

## 2018-03-16 DIAGNOSIS — I48.91 ATRIAL FIBRILLATION (H): ICD-10-CM

## 2018-03-16 DIAGNOSIS — M54.6 TRIGGER POINT OF THORACIC REGION: ICD-10-CM

## 2018-03-16 DIAGNOSIS — J44.0 CHRONIC OBSTRUCTIVE PULMONARY DISEASE WITH ACUTE LOWER RESPIRATORY INFECTION (H): ICD-10-CM

## 2018-03-16 DIAGNOSIS — G89.4 CHRONIC PAIN SYNDROME: ICD-10-CM

## 2018-03-16 DIAGNOSIS — I48.0 PAROXYSMAL ATRIAL FIBRILLATION (H): ICD-10-CM

## 2018-03-16 DIAGNOSIS — E11.65 TYPE 2 DIABETES MELLITUS WITH HYPERGLYCEMIA, WITHOUT LONG-TERM CURRENT USE OF INSULIN (H): ICD-10-CM

## 2018-03-16 DIAGNOSIS — H26.9 CATARACT: ICD-10-CM

## 2018-03-16 DIAGNOSIS — Z01.818 PREOP EXAMINATION: ICD-10-CM

## 2018-03-16 LAB
ANION GAP SERPL CALCULATED.3IONS-SCNC: 13 MMOL/L (ref 5–18)
BUN SERPL-MCNC: 15 MG/DL (ref 8–22)
CALCIUM SERPL-MCNC: 9.4 MG/DL (ref 8.5–10.5)
CHLORIDE BLD-SCNC: 105 MMOL/L (ref 98–107)
CO2 SERPL-SCNC: 26 MMOL/L (ref 22–31)
CREAT SERPL-MCNC: 0.65 MG/DL (ref 0.6–1.1)
GFR SERPL CREATININE-BSD FRML MDRD: >60 ML/MIN/1.73M2
GLUCOSE BLD-MCNC: 97 MG/DL (ref 70–125)
HBA1C MFR BLD: 6.4 % (ref 3.5–6)
HGB BLD-MCNC: 9.8 G/DL (ref 12–16)
INR PPP: 2 (ref 0.9–1.1)
POTASSIUM BLD-SCNC: 4.4 MMOL/L (ref 3.5–5)
SODIUM SERPL-SCNC: 144 MMOL/L (ref 136–145)

## 2018-03-20 ENCOUNTER — COMMUNICATION - HEALTHEAST (OUTPATIENT)
Dept: FAMILY MEDICINE | Facility: CLINIC | Age: 68
End: 2018-03-20

## 2018-04-04 ENCOUNTER — COMMUNICATION - HEALTHEAST (OUTPATIENT)
Dept: ADMINISTRATIVE | Facility: CLINIC | Age: 68
End: 2018-04-04

## 2018-04-05 ENCOUNTER — COMMUNICATION - HEALTHEAST (OUTPATIENT)
Dept: FAMILY MEDICINE | Facility: CLINIC | Age: 68
End: 2018-04-05

## 2018-04-05 DIAGNOSIS — R63.4 LOSS OF WEIGHT: ICD-10-CM

## 2018-04-13 ENCOUNTER — COMMUNICATION - HEALTHEAST (OUTPATIENT)
Dept: ANTICOAGULATION | Facility: CLINIC | Age: 68
End: 2018-04-13

## 2018-04-16 ENCOUNTER — COMMUNICATION - HEALTHEAST (OUTPATIENT)
Dept: ANTICOAGULATION | Facility: CLINIC | Age: 68
End: 2018-04-16

## 2018-04-16 ENCOUNTER — OFFICE VISIT - HEALTHEAST (OUTPATIENT)
Dept: FAMILY MEDICINE | Facility: CLINIC | Age: 68
End: 2018-04-16

## 2018-04-16 DIAGNOSIS — I48.91 ATRIAL FIBRILLATION (H): ICD-10-CM

## 2018-04-16 DIAGNOSIS — G89.4 CHRONIC PAIN SYNDROME: ICD-10-CM

## 2018-04-16 DIAGNOSIS — M54.6 TRIGGER POINT OF THORACIC REGION: ICD-10-CM

## 2018-04-16 LAB — INR PPP: 2.6 (ref 0.9–1.1)

## 2018-04-25 ENCOUNTER — COMMUNICATION - HEALTHEAST (OUTPATIENT)
Dept: FAMILY MEDICINE | Facility: CLINIC | Age: 68
End: 2018-04-25

## 2018-04-25 DIAGNOSIS — J44.1 COPD EXACERBATION (H): ICD-10-CM

## 2018-05-14 ENCOUNTER — COMMUNICATION - HEALTHEAST (OUTPATIENT)
Dept: FAMILY MEDICINE | Facility: CLINIC | Age: 68
End: 2018-05-14

## 2018-05-14 DIAGNOSIS — E11.9 TYPE 2 DIABETES MELLITUS (H): ICD-10-CM

## 2018-05-15 ENCOUNTER — COMMUNICATION - HEALTHEAST (OUTPATIENT)
Dept: FAMILY MEDICINE | Facility: CLINIC | Age: 68
End: 2018-05-15

## 2018-05-15 ENCOUNTER — OFFICE VISIT - HEALTHEAST (OUTPATIENT)
Dept: FAMILY MEDICINE | Facility: CLINIC | Age: 68
End: 2018-05-15

## 2018-05-15 ENCOUNTER — COMMUNICATION - HEALTHEAST (OUTPATIENT)
Dept: ANTICOAGULATION | Facility: CLINIC | Age: 68
End: 2018-05-15

## 2018-05-15 DIAGNOSIS — I48.91 ATRIAL FIBRILLATION (H): ICD-10-CM

## 2018-05-15 DIAGNOSIS — I48.0 PAROXYSMAL ATRIAL FIBRILLATION (H): ICD-10-CM

## 2018-05-15 DIAGNOSIS — M79.7 FIBROMYALGIA: ICD-10-CM

## 2018-05-15 DIAGNOSIS — H26.9 CATARACT: ICD-10-CM

## 2018-05-15 DIAGNOSIS — Z01.818 PREOP EXAMINATION: ICD-10-CM

## 2018-05-15 DIAGNOSIS — J44.9 COPD (CHRONIC OBSTRUCTIVE PULMONARY DISEASE) (H): ICD-10-CM

## 2018-05-15 DIAGNOSIS — G89.4 CHRONIC PAIN SYNDROME: ICD-10-CM

## 2018-05-15 LAB — INR PPP: 1.7 (ref 0.9–1.1)

## 2018-05-15 ASSESSMENT — MIFFLIN-ST. JEOR: SCORE: 1383.95

## 2018-05-16 ENCOUNTER — COMMUNICATION - HEALTHEAST (OUTPATIENT)
Dept: FAMILY MEDICINE | Facility: CLINIC | Age: 68
End: 2018-05-16

## 2018-05-16 DIAGNOSIS — E78.2 MIXED HYPERLIPIDEMIA: ICD-10-CM

## 2018-05-21 ENCOUNTER — COMMUNICATION - HEALTHEAST (OUTPATIENT)
Dept: FAMILY MEDICINE | Facility: CLINIC | Age: 68
End: 2018-05-21

## 2018-05-27 ENCOUNTER — COMMUNICATION - HEALTHEAST (OUTPATIENT)
Dept: FAMILY MEDICINE | Facility: CLINIC | Age: 68
End: 2018-05-27

## 2018-05-27 DIAGNOSIS — E11.9 DM (DIABETES MELLITUS) (H): ICD-10-CM

## 2018-06-03 ENCOUNTER — COMMUNICATION - HEALTHEAST (OUTPATIENT)
Dept: FAMILY MEDICINE | Facility: CLINIC | Age: 68
End: 2018-06-03

## 2018-06-03 ENCOUNTER — COMMUNICATION - HEALTHEAST (OUTPATIENT)
Dept: CARDIOLOGY | Facility: CLINIC | Age: 68
End: 2018-06-03

## 2018-06-03 DIAGNOSIS — J44.1 COPD EXACERBATION (H): ICD-10-CM

## 2018-06-03 DIAGNOSIS — I48.92 ATRIAL FLUTTER, UNSPECIFIED TYPE (H): ICD-10-CM

## 2018-06-13 ENCOUNTER — COMMUNICATION - HEALTHEAST (OUTPATIENT)
Dept: FAMILY MEDICINE | Facility: CLINIC | Age: 68
End: 2018-06-13

## 2018-06-13 DIAGNOSIS — E11.9 TYPE 2 DIABETES MELLITUS (H): ICD-10-CM

## 2018-06-15 ENCOUNTER — OFFICE VISIT - HEALTHEAST (OUTPATIENT)
Dept: FAMILY MEDICINE | Facility: CLINIC | Age: 68
End: 2018-06-15

## 2018-06-15 ENCOUNTER — COMMUNICATION - HEALTHEAST (OUTPATIENT)
Dept: ANTICOAGULATION | Facility: CLINIC | Age: 68
End: 2018-06-15

## 2018-06-15 DIAGNOSIS — I48.91 ATRIAL FIBRILLATION (H): ICD-10-CM

## 2018-06-15 DIAGNOSIS — R60.9 EDEMA: ICD-10-CM

## 2018-06-15 DIAGNOSIS — L98.9 SKIN LESION: ICD-10-CM

## 2018-06-15 DIAGNOSIS — M54.6 TRIGGER POINT OF THORACIC REGION: ICD-10-CM

## 2018-06-15 DIAGNOSIS — G89.4 CHRONIC PAIN SYNDROME: ICD-10-CM

## 2018-06-15 LAB — INR PPP: 2.7 (ref 0.9–1.1)

## 2018-06-16 ENCOUNTER — COMMUNICATION - HEALTHEAST (OUTPATIENT)
Dept: FAMILY MEDICINE | Facility: CLINIC | Age: 68
End: 2018-06-16

## 2018-06-16 DIAGNOSIS — E11.9 TYPE 2 DIABETES MELLITUS (H): ICD-10-CM

## 2018-06-25 ENCOUNTER — OFFICE VISIT - HEALTHEAST (OUTPATIENT)
Dept: FAMILY MEDICINE | Facility: CLINIC | Age: 68
End: 2018-06-25

## 2018-06-25 ENCOUNTER — COMMUNICATION - HEALTHEAST (OUTPATIENT)
Dept: ANTICOAGULATION | Facility: CLINIC | Age: 68
End: 2018-06-25

## 2018-06-25 ENCOUNTER — COMMUNICATION - HEALTHEAST (OUTPATIENT)
Dept: FAMILY MEDICINE | Facility: CLINIC | Age: 68
End: 2018-06-25

## 2018-06-25 ENCOUNTER — COMMUNICATION - HEALTHEAST (OUTPATIENT)
Dept: SCHEDULING | Facility: CLINIC | Age: 68
End: 2018-06-25

## 2018-06-25 DIAGNOSIS — Z12.11 SCREEN FOR COLON CANCER: ICD-10-CM

## 2018-06-25 DIAGNOSIS — D50.0 IRON DEFICIENCY ANEMIA DUE TO CHRONIC BLOOD LOSS: ICD-10-CM

## 2018-06-25 DIAGNOSIS — I48.91 ATRIAL FIBRILLATION (H): ICD-10-CM

## 2018-06-25 DIAGNOSIS — K92.1 BLACK TARRY STOOLS: ICD-10-CM

## 2018-06-25 LAB
BASOPHILS # BLD AUTO: 0.1 THOU/UL (ref 0–0.2)
BASOPHILS NFR BLD AUTO: 1 % (ref 0–2)
EOSINOPHIL # BLD AUTO: 0.3 THOU/UL (ref 0–0.4)
EOSINOPHIL NFR BLD AUTO: 3 % (ref 0–6)
ERYTHROCYTE [DISTWIDTH] IN BLOOD BY AUTOMATED COUNT: 17.3 % (ref 11–14.5)
HCT VFR BLD AUTO: 32.4 % (ref 35–47)
HGB BLD-MCNC: 10 G/DL (ref 12–16)
INR PPP: 2.9 (ref 0.9–1.1)
IRON SATN MFR SERPL: 3 % (ref 20–50)
IRON SERPL-MCNC: 16 UG/DL (ref 42–175)
LYMPHOCYTES # BLD AUTO: 2.5 THOU/UL (ref 0.8–4.4)
LYMPHOCYTES NFR BLD AUTO: 32 % (ref 20–40)
MCH RBC QN AUTO: 22.4 PG (ref 27–34)
MCHC RBC AUTO-ENTMCNC: 30.8 G/DL (ref 32–36)
MCV RBC AUTO: 73 FL (ref 80–100)
MONOCYTES # BLD AUTO: 0.6 THOU/UL (ref 0–0.9)
MONOCYTES NFR BLD AUTO: 8 % (ref 2–10)
NEUTROPHILS # BLD AUTO: 4.5 THOU/UL (ref 2–7.7)
NEUTROPHILS NFR BLD AUTO: 56 % (ref 50–70)
PLATELET # BLD AUTO: 248 THOU/UL (ref 140–440)
PMV BLD AUTO: 8.3 FL (ref 7–10)
RBC # BLD AUTO: 4.45 MILL/UL (ref 3.8–5.4)
TIBC SERPL-MCNC: 510 UG/DL (ref 313–563)
TRANSFERRIN SERPL-MCNC: 408 MG/DL (ref 212–360)
WBC: 8 THOU/UL (ref 4–11)

## 2018-06-27 ENCOUNTER — OFFICE VISIT - HEALTHEAST (OUTPATIENT)
Dept: FAMILY MEDICINE | Facility: CLINIC | Age: 68
End: 2018-06-27

## 2018-06-27 ENCOUNTER — COMMUNICATION - HEALTHEAST (OUTPATIENT)
Dept: ANTICOAGULATION | Facility: CLINIC | Age: 68
End: 2018-06-27

## 2018-06-27 DIAGNOSIS — D64.9 CHRONIC ANEMIA: ICD-10-CM

## 2018-06-27 DIAGNOSIS — K92.1 BLACK STOOLS: ICD-10-CM

## 2018-06-27 DIAGNOSIS — I48.91 ATRIAL FIBRILLATION (H): ICD-10-CM

## 2018-06-27 LAB
HGB BLD-MCNC: 10.1 G/DL (ref 12–16)
INR PPP: 3.8 (ref 0.9–1.1)

## 2018-06-29 ENCOUNTER — AMBULATORY - HEALTHEAST (OUTPATIENT)
Dept: LAB | Facility: CLINIC | Age: 68
End: 2018-06-29

## 2018-06-29 ENCOUNTER — COMMUNICATION - HEALTHEAST (OUTPATIENT)
Dept: FAMILY MEDICINE | Facility: CLINIC | Age: 68
End: 2018-06-29

## 2018-06-29 ENCOUNTER — COMMUNICATION - HEALTHEAST (OUTPATIENT)
Dept: ANTICOAGULATION | Facility: CLINIC | Age: 68
End: 2018-06-29

## 2018-06-29 DIAGNOSIS — I48.91 ATRIAL FIBRILLATION (H): ICD-10-CM

## 2018-06-29 DIAGNOSIS — K92.1 BLACK STOOLS: ICD-10-CM

## 2018-06-29 DIAGNOSIS — D64.9 CHRONIC ANEMIA: ICD-10-CM

## 2018-06-29 LAB
HGB BLD-MCNC: 9.6 G/DL (ref 12–16)
INR PPP: 2.6 (ref 0.9–1.1)

## 2018-07-02 ENCOUNTER — COMMUNICATION - HEALTHEAST (OUTPATIENT)
Dept: FAMILY MEDICINE | Facility: CLINIC | Age: 68
End: 2018-07-02

## 2018-07-09 ENCOUNTER — COMMUNICATION - HEALTHEAST (OUTPATIENT)
Dept: FAMILY MEDICINE | Facility: CLINIC | Age: 68
End: 2018-07-09

## 2018-07-13 ENCOUNTER — COMMUNICATION - HEALTHEAST (OUTPATIENT)
Dept: ANTICOAGULATION | Facility: CLINIC | Age: 68
End: 2018-07-13

## 2018-07-18 ENCOUNTER — OFFICE VISIT - HEALTHEAST (OUTPATIENT)
Dept: FAMILY MEDICINE | Facility: CLINIC | Age: 68
End: 2018-07-18

## 2018-07-18 ENCOUNTER — COMMUNICATION - HEALTHEAST (OUTPATIENT)
Dept: ANTICOAGULATION | Facility: CLINIC | Age: 68
End: 2018-07-18

## 2018-07-18 DIAGNOSIS — I48.91 ATRIAL FIBRILLATION (H): ICD-10-CM

## 2018-07-18 DIAGNOSIS — D62 ANEMIA DUE TO BLOOD LOSS, ACUTE: ICD-10-CM

## 2018-07-18 DIAGNOSIS — M79.7 FIBROMYALGIA: ICD-10-CM

## 2018-07-18 DIAGNOSIS — E11.42 DIABETIC POLYNEUROPATHY ASSOCIATED WITH TYPE 2 DIABETES MELLITUS (H): ICD-10-CM

## 2018-07-18 DIAGNOSIS — G89.4 CHRONIC PAIN SYNDROME: ICD-10-CM

## 2018-07-18 DIAGNOSIS — M54.6 TRIGGER POINT OF THORACIC REGION: ICD-10-CM

## 2018-07-18 LAB
HGB BLD-MCNC: 9.8 G/DL (ref 12–16)
INR PPP: 2.3 (ref 0.9–1.1)

## 2018-07-24 ENCOUNTER — COMMUNICATION - HEALTHEAST (OUTPATIENT)
Dept: FAMILY MEDICINE | Facility: CLINIC | Age: 68
End: 2018-07-24

## 2018-07-24 DIAGNOSIS — D50.0 IRON DEFICIENCY ANEMIA DUE TO CHRONIC BLOOD LOSS: ICD-10-CM

## 2018-07-31 ENCOUNTER — COMMUNICATION - HEALTHEAST (OUTPATIENT)
Dept: FAMILY MEDICINE | Facility: CLINIC | Age: 68
End: 2018-07-31

## 2018-07-31 DIAGNOSIS — I48.0 PAROXYSMAL ATRIAL FIBRILLATION (H): ICD-10-CM

## 2018-08-01 ENCOUNTER — OFFICE VISIT - HEALTHEAST (OUTPATIENT)
Dept: CARDIOLOGY | Facility: CLINIC | Age: 68
End: 2018-08-01

## 2018-08-01 DIAGNOSIS — I48.0 PAROXYSMAL ATRIAL FIBRILLATION (H): ICD-10-CM

## 2018-08-01 DIAGNOSIS — I35.0 NONRHEUMATIC AORTIC VALVE STENOSIS: ICD-10-CM

## 2018-08-01 ASSESSMENT — MIFFLIN-ST. JEOR: SCORE: 1342.56

## 2018-08-13 ENCOUNTER — COMMUNICATION - HEALTHEAST (OUTPATIENT)
Dept: FAMILY MEDICINE | Facility: CLINIC | Age: 68
End: 2018-08-13

## 2018-08-14 ENCOUNTER — COMMUNICATION - HEALTHEAST (OUTPATIENT)
Dept: FAMILY MEDICINE | Facility: CLINIC | Age: 68
End: 2018-08-14

## 2018-08-14 DIAGNOSIS — J44.1 COPD EXACERBATION (H): ICD-10-CM

## 2018-08-14 DIAGNOSIS — E11.40 DM NEUROPATHY, TYPE II DIABETES MELLITUS (H): ICD-10-CM

## 2018-08-14 DIAGNOSIS — F33.9 MAJOR DEPRESSIVE DISORDER, RECURRENT EPISODE (H): ICD-10-CM

## 2018-08-14 DIAGNOSIS — I48.0 PAROXYSMAL ATRIAL FIBRILLATION (H): ICD-10-CM

## 2018-08-15 ENCOUNTER — COMMUNICATION - HEALTHEAST (OUTPATIENT)
Dept: FAMILY MEDICINE | Facility: CLINIC | Age: 68
End: 2018-08-15

## 2018-08-15 ENCOUNTER — COMMUNICATION - HEALTHEAST (OUTPATIENT)
Dept: ANTICOAGULATION | Facility: CLINIC | Age: 68
End: 2018-08-15

## 2018-08-15 ENCOUNTER — OFFICE VISIT - HEALTHEAST (OUTPATIENT)
Dept: FAMILY MEDICINE | Facility: CLINIC | Age: 68
End: 2018-08-15

## 2018-08-15 DIAGNOSIS — J01.90 ACUTE SINUSITIS: ICD-10-CM

## 2018-08-15 DIAGNOSIS — I48.91 ATRIAL FIBRILLATION (H): ICD-10-CM

## 2018-08-15 DIAGNOSIS — E11.65 TYPE 2 DIABETES MELLITUS WITH HYPERGLYCEMIA, WITHOUT LONG-TERM CURRENT USE OF INSULIN (H): ICD-10-CM

## 2018-08-15 DIAGNOSIS — G89.4 CHRONIC PAIN SYNDROME: ICD-10-CM

## 2018-08-15 LAB — INR PPP: 3.1 (ref 0.9–1.1)

## 2018-08-17 ENCOUNTER — HOSPITAL ENCOUNTER (OUTPATIENT)
Dept: CARDIOLOGY | Facility: CLINIC | Age: 68
Discharge: HOME | End: 2018-08-17
Attending: INTERNAL MEDICINE

## 2018-08-17 DIAGNOSIS — I35.0 NONRHEUMATIC AORTIC VALVE STENOSIS: ICD-10-CM

## 2018-08-17 ASSESSMENT — MIFFLIN-ST. JEOR: SCORE: 1338.02

## 2018-08-20 LAB
AORTIC ROOT: 3 CM
AORTIC VALVE MEAN VELOCITY: 238 CM/S
AR DECEL SLOPE: 2910 MM/S2
AR PEAK VELOCITY: 448 CM/S
AV DIMENSIONLESS INDEX VTI: 0.4
AV MEAN GRADIENT: 25 MMHG
AV PEAK GRADIENT: 44.9 MMHG
AV REGURGITANT PEAK GRADIENT: 80.3 MMHG
AV REGURGITATION PRESSURE HALF TIME: 551 MS
AV VALVE AREA: 1.2 CM2
AV VELOCITY RATIO: 0.3
BSA FOR ECHO PROCEDURE: 1.94 M2
CV ECHO HEIGHT: 64.5 IN
CV ECHO WEIGHT: 183 LBS
DOP CALC AO PEAK VEL: 335 CM/S
DOP CALC AO VTI: 61.9 CM
DOP CALC LVOT AREA: 3.14 CM2
DOP CALC LVOT DIAMETER: 2 CM
DOP CALC LVOT PEAK VEL: 105 CM/S
DOP CALC LVOT STROKE VOLUME: 71.6 CM3
DOP CALCLVOT PEAK VEL VTI: 22.8 CM
ECHO EJECTION FRACTION ESTIMATED: 65 %
FRACTIONAL SHORTENING: 35.4 % (ref 28–44)
INTERVENTRICULAR SEPTUM IN END DIASTOLE: 1.32 CM (ref 0.6–0.9)
IVS/PW RATIO: 1.3
LA AREA 1: 21.4 CM2
LA AREA 2: 15.6 CM2
LEFT ATRIUM LENGTH: 5.1 CM
LEFT ATRIUM VOLUME INDEX: 28.7 ML/M2
LEFT ATRIUM VOLUME: 55.6 ML
LEFT VENTRICLE MASS INDEX: 88.4 G/M2
LEFT VENTRICULAR INTERNAL DIMENSION IN DIASTOLE: 4.24 CM (ref 3.8–5.2)
LEFT VENTRICULAR INTERNAL DIMENSION IN SYSTOLE: 2.74 CM (ref 2.2–3.5)
LEFT VENTRICULAR MASS: 171.4 G
LEFT VENTRICULAR OUTFLOW TRACT MEAN GRADIENT: 3 MMHG
LEFT VENTRICULAR OUTFLOW TRACT MEAN VELOCITY: 86.2 CM/S
LEFT VENTRICULAR OUTFLOW TRACT PEAK GRADIENT: 4 MMHG
LEFT VENTRICULAR POSTERIOR WALL IN END DIASTOLE: 0.99 CM (ref 0.6–0.9)
LV STROKE VOLUME INDEX: 36.9 ML/M2
MITRAL VALVE E/A RATIO: 0.9
MV AVERAGE E/E' RATIO: 14.8 CM/S
MV DECELERATION TIME: 257 MS
MV E'TISSUE VEL-LAT: 6.53 CM/S
MV E'TISSUE VEL-MED: 5.95 CM/S
MV LATERAL E/E' RATIO: 14.1
MV MEDIAL E/E' RATIO: 15.5
MV PEAK A VELOCITY: 97.7 CM/S
MV PEAK E VELOCITY: 92.3 CM/S
NUC REST DIASTOLIC VOLUME INDEX: 2928 LBS
NUC REST SYSTOLIC VOLUME INDEX: 64.5 IN
TRICUSPID VALVE ANULAR PLANE SYSTOLIC EXCURSION: 2 CM

## 2018-08-27 ENCOUNTER — COMMUNICATION - HEALTHEAST (OUTPATIENT)
Dept: ANTICOAGULATION | Facility: CLINIC | Age: 68
End: 2018-08-27

## 2018-08-28 ENCOUNTER — COMMUNICATION - HEALTHEAST (OUTPATIENT)
Dept: FAMILY MEDICINE | Facility: CLINIC | Age: 68
End: 2018-08-28

## 2018-08-28 DIAGNOSIS — E11.65 TYPE 2 DIABETES MELLITUS WITH HYPERGLYCEMIA (H): ICD-10-CM

## 2018-08-28 DIAGNOSIS — J44.1 COPD EXACERBATION (H): ICD-10-CM

## 2018-08-28 RX ORDER — LANCETS
EACH MISCELLANEOUS
Qty: 300 EACH | Refills: 3 | Status: SHIPPED | OUTPATIENT
Start: 2018-08-28

## 2018-08-29 ENCOUNTER — COMMUNICATION - HEALTHEAST (OUTPATIENT)
Dept: FAMILY MEDICINE | Facility: CLINIC | Age: 68
End: 2018-08-29

## 2018-08-29 DIAGNOSIS — E11.65 TYPE 2 DIABETES MELLITUS WITH HYPERGLYCEMIA (H): ICD-10-CM

## 2018-09-04 ENCOUNTER — COMMUNICATION - HEALTHEAST (OUTPATIENT)
Dept: CARDIOLOGY | Facility: CLINIC | Age: 68
End: 2018-09-04

## 2018-09-04 ENCOUNTER — COMMUNICATION - HEALTHEAST (OUTPATIENT)
Dept: FAMILY MEDICINE | Facility: CLINIC | Age: 68
End: 2018-09-04

## 2018-09-04 DIAGNOSIS — I48.92 ATRIAL FLUTTER, UNSPECIFIED TYPE (H): ICD-10-CM

## 2018-09-04 DIAGNOSIS — E11.9 TYPE 2 DIABETES MELLITUS (H): ICD-10-CM

## 2018-09-09 ENCOUNTER — COMMUNICATION - HEALTHEAST (OUTPATIENT)
Dept: FAMILY MEDICINE | Facility: CLINIC | Age: 68
End: 2018-09-09

## 2018-09-09 DIAGNOSIS — G89.4 CHRONIC PAIN SYNDROME: ICD-10-CM

## 2018-09-14 ENCOUNTER — COMMUNICATION - HEALTHEAST (OUTPATIENT)
Dept: ANTICOAGULATION | Facility: CLINIC | Age: 68
End: 2018-09-14

## 2018-09-14 ENCOUNTER — OFFICE VISIT - HEALTHEAST (OUTPATIENT)
Dept: FAMILY MEDICINE | Facility: CLINIC | Age: 68
End: 2018-09-14

## 2018-09-14 DIAGNOSIS — I48.91 ATRIAL FIBRILLATION (H): ICD-10-CM

## 2018-09-14 DIAGNOSIS — G89.4 CHRONIC PAIN SYNDROME: ICD-10-CM

## 2018-09-14 DIAGNOSIS — M79.7 FIBROMYALGIA: ICD-10-CM

## 2018-09-14 DIAGNOSIS — M54.6 TRIGGER POINT OF THORACIC REGION: ICD-10-CM

## 2018-09-14 LAB — INR PPP: 3.5 (ref 0.9–1.1)

## 2018-09-21 ENCOUNTER — RECORDS - HEALTHEAST (OUTPATIENT)
Dept: ADMINISTRATIVE | Facility: OTHER | Age: 68
End: 2018-09-21

## 2018-09-24 ENCOUNTER — COMMUNICATION - HEALTHEAST (OUTPATIENT)
Dept: ANTICOAGULATION | Facility: CLINIC | Age: 68
End: 2018-09-24

## 2018-09-24 ENCOUNTER — COMMUNICATION - HEALTHEAST (OUTPATIENT)
Dept: FAMILY MEDICINE | Facility: CLINIC | Age: 68
End: 2018-09-24

## 2018-10-03 ENCOUNTER — COMMUNICATION - HEALTHEAST (OUTPATIENT)
Dept: FAMILY MEDICINE | Facility: CLINIC | Age: 68
End: 2018-10-03

## 2018-10-03 DIAGNOSIS — K21.9 GERD (GASTROESOPHAGEAL REFLUX DISEASE): ICD-10-CM

## 2018-10-03 DIAGNOSIS — J44.1 COPD EXACERBATION (H): ICD-10-CM

## 2018-10-03 DIAGNOSIS — J01.90 ACUTE SINUSITIS: ICD-10-CM

## 2018-10-10 ENCOUNTER — COMMUNICATION - HEALTHEAST (OUTPATIENT)
Dept: FAMILY MEDICINE | Facility: CLINIC | Age: 68
End: 2018-10-10

## 2018-10-10 ENCOUNTER — AMBULATORY - HEALTHEAST (OUTPATIENT)
Dept: FAMILY MEDICINE | Facility: CLINIC | Age: 68
End: 2018-10-10

## 2018-10-17 ENCOUNTER — COMMUNICATION - HEALTHEAST (OUTPATIENT)
Dept: ANTICOAGULATION | Facility: CLINIC | Age: 68
End: 2018-10-17

## 2018-10-17 ENCOUNTER — OFFICE VISIT - HEALTHEAST (OUTPATIENT)
Dept: FAMILY MEDICINE | Facility: CLINIC | Age: 68
End: 2018-10-17

## 2018-10-17 DIAGNOSIS — Z23 NEED FOR SHINGLES VACCINE: ICD-10-CM

## 2018-10-17 DIAGNOSIS — E11.65 TYPE 2 DIABETES MELLITUS WITH HYPERGLYCEMIA, WITHOUT LONG-TERM CURRENT USE OF INSULIN (H): ICD-10-CM

## 2018-10-17 DIAGNOSIS — G89.4 CHRONIC PAIN SYNDROME: ICD-10-CM

## 2018-10-17 DIAGNOSIS — I48.91 ATRIAL FIBRILLATION (H): ICD-10-CM

## 2018-10-17 DIAGNOSIS — J44.9 COPD (CHRONIC OBSTRUCTIVE PULMONARY DISEASE) (H): ICD-10-CM

## 2018-10-17 DIAGNOSIS — K29.00 ACUTE GASTRITIS WITHOUT HEMORRHAGE, UNSPECIFIED GASTRITIS TYPE: ICD-10-CM

## 2018-10-17 DIAGNOSIS — Z01.818 PREOP EXAMINATION: ICD-10-CM

## 2018-10-17 DIAGNOSIS — D64.9 CHRONIC ANEMIA: ICD-10-CM

## 2018-10-17 DIAGNOSIS — J44.0 CHRONIC OBSTRUCTIVE PULMONARY DISEASE WITH ACUTE LOWER RESPIRATORY INFECTION (H): ICD-10-CM

## 2018-10-17 DIAGNOSIS — M79.7 FIBROMYALGIA: ICD-10-CM

## 2018-10-17 LAB
ALBUMIN SERPL-MCNC: 3.5 G/DL (ref 3.5–5)
ALP SERPL-CCNC: 97 U/L (ref 45–120)
ALT SERPL W P-5'-P-CCNC: 24 U/L (ref 0–45)
ANION GAP SERPL CALCULATED.3IONS-SCNC: 15 MMOL/L (ref 5–18)
AST SERPL W P-5'-P-CCNC: 16 U/L (ref 0–40)
BASOPHILS # BLD AUTO: 0.1 THOU/UL (ref 0–0.2)
BASOPHILS NFR BLD AUTO: 1 % (ref 0–2)
BILIRUB SERPL-MCNC: 0.3 MG/DL (ref 0–1)
BUN SERPL-MCNC: 12 MG/DL (ref 8–22)
CALCIUM SERPL-MCNC: 9 MG/DL (ref 8.5–10.5)
CHLORIDE BLD-SCNC: 104 MMOL/L (ref 98–107)
CHOLEST SERPL-MCNC: 169 MG/DL
CO2 SERPL-SCNC: 24 MMOL/L (ref 22–31)
CREAT SERPL-MCNC: 0.63 MG/DL (ref 0.6–1.1)
EOSINOPHIL # BLD AUTO: 0.2 THOU/UL (ref 0–0.4)
EOSINOPHIL NFR BLD AUTO: 2 % (ref 0–6)
ERYTHROCYTE [DISTWIDTH] IN BLOOD BY AUTOMATED COUNT: 19.3 % (ref 11–14.5)
FASTING STATUS PATIENT QL REPORTED: YES
GFR SERPL CREATININE-BSD FRML MDRD: >60 ML/MIN/1.73M2
GLUCOSE BLD-MCNC: 63 MG/DL (ref 70–125)
HBA1C MFR BLD: 6 % (ref 3.5–6)
HCT VFR BLD AUTO: 35.4 % (ref 35–47)
HDLC SERPL-MCNC: 80 MG/DL
HGB BLD-MCNC: 9.8 G/DL (ref 12–16)
INR PPP: 2.6 (ref 0.9–1.1)
LDLC SERPL CALC-MCNC: 73 MG/DL
LYMPHOCYTES # BLD AUTO: 1.8 THOU/UL (ref 0.8–4.4)
LYMPHOCYTES NFR BLD AUTO: 19 % (ref 20–40)
MCH RBC QN AUTO: 22 PG (ref 27–34)
MCHC RBC AUTO-ENTMCNC: 27.7 G/DL (ref 32–36)
MCV RBC AUTO: 79 FL (ref 80–100)
MONOCYTES # BLD AUTO: 0.8 THOU/UL (ref 0–0.9)
MONOCYTES NFR BLD AUTO: 8 % (ref 2–10)
NEUTROPHILS # BLD AUTO: 7 THOU/UL (ref 2–7.7)
NEUTROPHILS NFR BLD AUTO: 71 % (ref 50–70)
PLATELET # BLD AUTO: 317 THOU/UL (ref 140–440)
PMV BLD AUTO: 10.8 FL (ref 8.5–12.5)
POTASSIUM BLD-SCNC: 3.5 MMOL/L (ref 3.5–5)
PROT SERPL-MCNC: 6.6 G/DL (ref 6–8)
RBC # BLD AUTO: 4.46 MILL/UL (ref 3.8–5.4)
SODIUM SERPL-SCNC: 143 MMOL/L (ref 136–145)
TRIGL SERPL-MCNC: 78 MG/DL
WBC: 9.8 THOU/UL (ref 4–11)

## 2018-10-17 ASSESSMENT — MIFFLIN-ST. JEOR: SCORE: 1320.73

## 2018-10-29 ENCOUNTER — COMMUNICATION - HEALTHEAST (OUTPATIENT)
Dept: FAMILY MEDICINE | Facility: CLINIC | Age: 68
End: 2018-10-29

## 2018-10-30 ENCOUNTER — COMMUNICATION - HEALTHEAST (OUTPATIENT)
Dept: FAMILY MEDICINE | Facility: CLINIC | Age: 68
End: 2018-10-30

## 2018-10-30 DIAGNOSIS — J44.1 COPD EXACERBATION (H): ICD-10-CM

## 2018-10-31 RX ORDER — IPRATROPIUM BROMIDE AND ALBUTEROL SULFATE 2.5; .5 MG/3ML; MG/3ML
SOLUTION RESPIRATORY (INHALATION)
Qty: 1,440 ML | Refills: 0 | Status: SHIPPED | OUTPATIENT
Start: 2018-10-31 | End: 2021-08-03

## 2018-11-01 ENCOUNTER — COMMUNICATION - HEALTHEAST (OUTPATIENT)
Dept: FAMILY MEDICINE | Facility: CLINIC | Age: 68
End: 2018-11-01

## 2018-11-01 DIAGNOSIS — E11.9 TYPE 2 DIABETES MELLITUS (H): ICD-10-CM

## 2018-11-02 ENCOUNTER — COMMUNICATION - HEALTHEAST (OUTPATIENT)
Dept: ANTICOAGULATION | Facility: CLINIC | Age: 68
End: 2018-11-02

## 2018-11-06 ENCOUNTER — ANESTHESIA - HEALTHEAST (OUTPATIENT)
Dept: SURGERY | Facility: HOSPITAL | Age: 68
End: 2018-11-06

## 2018-11-06 ENCOUNTER — SURGERY - HEALTHEAST (OUTPATIENT)
Dept: SURGERY | Facility: HOSPITAL | Age: 68
End: 2018-11-06

## 2018-11-07 ENCOUNTER — COMMUNICATION - HEALTHEAST (OUTPATIENT)
Dept: ANTICOAGULATION | Facility: CLINIC | Age: 68
End: 2018-11-07

## 2018-11-08 ENCOUNTER — COMMUNICATION - HEALTHEAST (OUTPATIENT)
Dept: FAMILY MEDICINE | Facility: CLINIC | Age: 68
End: 2018-11-08

## 2018-11-14 ENCOUNTER — COMMUNICATION - HEALTHEAST (OUTPATIENT)
Dept: FAMILY MEDICINE | Facility: CLINIC | Age: 68
End: 2018-11-14

## 2018-11-14 DIAGNOSIS — F33.9 MAJOR DEPRESSIVE DISORDER, RECURRENT EPISODE (H): ICD-10-CM

## 2018-11-16 ENCOUNTER — COMMUNICATION - HEALTHEAST (OUTPATIENT)
Dept: TELEHEALTH | Facility: CLINIC | Age: 68
End: 2018-11-16

## 2018-11-16 ENCOUNTER — COMMUNICATION - HEALTHEAST (OUTPATIENT)
Dept: ANTICOAGULATION | Facility: CLINIC | Age: 68
End: 2018-11-16

## 2018-11-16 ENCOUNTER — OFFICE VISIT - HEALTHEAST (OUTPATIENT)
Dept: FAMILY MEDICINE | Facility: CLINIC | Age: 68
End: 2018-11-16

## 2018-11-16 DIAGNOSIS — F17.200 TOBACCO USE DISORDER: ICD-10-CM

## 2018-11-16 DIAGNOSIS — I48.91 ATRIAL FIBRILLATION (H): ICD-10-CM

## 2018-11-16 DIAGNOSIS — M79.7 FIBROMYALGIA: ICD-10-CM

## 2018-11-16 DIAGNOSIS — G89.4 CHRONIC PAIN SYNDROME: ICD-10-CM

## 2018-11-16 LAB — INR PPP: 2.2 (ref 0.9–1.1)

## 2018-12-04 ENCOUNTER — COMMUNICATION - HEALTHEAST (OUTPATIENT)
Dept: FAMILY MEDICINE | Facility: CLINIC | Age: 68
End: 2018-12-04

## 2018-12-04 ENCOUNTER — COMMUNICATION - HEALTHEAST (OUTPATIENT)
Dept: ANTICOAGULATION | Facility: CLINIC | Age: 68
End: 2018-12-04

## 2018-12-04 DIAGNOSIS — D64.9 CHRONIC ANEMIA: ICD-10-CM

## 2018-12-04 DIAGNOSIS — I48.91 ATRIAL FIBRILLATION (H): ICD-10-CM

## 2018-12-07 ENCOUNTER — COMMUNICATION - HEALTHEAST (OUTPATIENT)
Dept: FAMILY MEDICINE | Facility: CLINIC | Age: 68
End: 2018-12-07

## 2018-12-12 ENCOUNTER — COMMUNICATION - HEALTHEAST (OUTPATIENT)
Dept: FAMILY MEDICINE | Facility: CLINIC | Age: 68
End: 2018-12-12

## 2018-12-17 ENCOUNTER — COMMUNICATION - HEALTHEAST (OUTPATIENT)
Dept: ANTICOAGULATION | Facility: CLINIC | Age: 68
End: 2018-12-17

## 2018-12-17 ENCOUNTER — AMBULATORY - HEALTHEAST (OUTPATIENT)
Dept: CARE COORDINATION | Facility: CLINIC | Age: 68
End: 2018-12-17

## 2018-12-17 DIAGNOSIS — J44.1 COPD EXACERBATION (H): ICD-10-CM

## 2018-12-17 DIAGNOSIS — I48.91 ATRIAL FIBRILLATION (H): ICD-10-CM

## 2018-12-17 DIAGNOSIS — E11.65 TYPE 2 DIABETES MELLITUS WITH HYPERGLYCEMIA, WITHOUT LONG-TERM CURRENT USE OF INSULIN (H): ICD-10-CM

## 2018-12-17 DIAGNOSIS — K92.1 MELENA: ICD-10-CM

## 2018-12-18 ENCOUNTER — COMMUNICATION - HEALTHEAST (OUTPATIENT)
Dept: CARE COORDINATION | Facility: CLINIC | Age: 68
End: 2018-12-18

## 2018-12-19 ENCOUNTER — COMMUNICATION - HEALTHEAST (OUTPATIENT)
Dept: NURSING | Facility: CLINIC | Age: 68
End: 2018-12-19

## 2018-12-21 ENCOUNTER — COMMUNICATION - HEALTHEAST (OUTPATIENT)
Dept: ANTICOAGULATION | Facility: CLINIC | Age: 68
End: 2018-12-21

## 2018-12-21 ENCOUNTER — OFFICE VISIT - HEALTHEAST (OUTPATIENT)
Dept: FAMILY MEDICINE | Facility: CLINIC | Age: 68
End: 2018-12-21

## 2018-12-21 DIAGNOSIS — I48.91 ATRIAL FIBRILLATION (H): ICD-10-CM

## 2018-12-21 DIAGNOSIS — M79.7 FIBROMYALGIA: ICD-10-CM

## 2018-12-21 DIAGNOSIS — D62 ANEMIA DUE TO BLOOD LOSS, ACUTE: ICD-10-CM

## 2018-12-21 DIAGNOSIS — G89.4 CHRONIC PAIN SYNDROME: ICD-10-CM

## 2018-12-21 LAB — INR PPP: 1.4 (ref 0.9–1.1)

## 2018-12-27 ENCOUNTER — COMMUNICATION - HEALTHEAST (OUTPATIENT)
Dept: FAMILY MEDICINE | Facility: CLINIC | Age: 68
End: 2018-12-27

## 2018-12-27 DIAGNOSIS — J44.1 COPD EXACERBATION (H): ICD-10-CM

## 2018-12-28 ENCOUNTER — COMMUNICATION - HEALTHEAST (OUTPATIENT)
Dept: FAMILY MEDICINE | Facility: CLINIC | Age: 68
End: 2018-12-28

## 2018-12-31 ENCOUNTER — COMMUNICATION - HEALTHEAST (OUTPATIENT)
Dept: ANTICOAGULATION | Facility: CLINIC | Age: 68
End: 2018-12-31

## 2019-01-09 ENCOUNTER — COMMUNICATION - HEALTHEAST (OUTPATIENT)
Dept: FAMILY MEDICINE | Facility: CLINIC | Age: 69
End: 2019-01-09

## 2019-01-09 DIAGNOSIS — J01.90 ACUTE SINUSITIS: ICD-10-CM

## 2019-01-16 ENCOUNTER — COMMUNICATION - HEALTHEAST (OUTPATIENT)
Dept: ANTICOAGULATION | Facility: CLINIC | Age: 69
End: 2019-01-16

## 2019-01-16 ENCOUNTER — COMMUNICATION - HEALTHEAST (OUTPATIENT)
Dept: FAMILY MEDICINE | Facility: CLINIC | Age: 69
End: 2019-01-16

## 2019-01-16 ENCOUNTER — OFFICE VISIT - HEALTHEAST (OUTPATIENT)
Dept: FAMILY MEDICINE | Facility: CLINIC | Age: 69
End: 2019-01-16

## 2019-01-16 DIAGNOSIS — I48.91 ATRIAL FIBRILLATION (H): ICD-10-CM

## 2019-01-16 DIAGNOSIS — M79.7 FIBROMYALGIA: ICD-10-CM

## 2019-01-16 DIAGNOSIS — M16.12 PRIMARY OSTEOARTHRITIS OF LEFT HIP: ICD-10-CM

## 2019-01-16 DIAGNOSIS — G89.4 CHRONIC PAIN SYNDROME: ICD-10-CM

## 2019-01-16 DIAGNOSIS — E11.9 TYPE 2 DIABETES MELLITUS (H): ICD-10-CM

## 2019-01-16 LAB — INR PPP: 2.3 (ref 0.9–1.1)

## 2019-01-19 ENCOUNTER — COMMUNICATION - HEALTHEAST (OUTPATIENT)
Dept: FAMILY MEDICINE | Facility: CLINIC | Age: 69
End: 2019-01-19

## 2019-01-19 DIAGNOSIS — K21.9 GERD (GASTROESOPHAGEAL REFLUX DISEASE): ICD-10-CM

## 2019-02-14 ENCOUNTER — COMMUNICATION - HEALTHEAST (OUTPATIENT)
Dept: FAMILY MEDICINE | Facility: CLINIC | Age: 69
End: 2019-02-14

## 2019-02-14 DIAGNOSIS — E11.40 DM NEUROPATHY, TYPE II DIABETES MELLITUS (H): ICD-10-CM

## 2019-02-14 DIAGNOSIS — I48.0 PAROXYSMAL ATRIAL FIBRILLATION (H): ICD-10-CM

## 2019-02-18 ENCOUNTER — COMMUNICATION - HEALTHEAST (OUTPATIENT)
Dept: ANTICOAGULATION | Facility: CLINIC | Age: 69
End: 2019-02-18

## 2019-02-18 ENCOUNTER — AMBULATORY - HEALTHEAST (OUTPATIENT)
Dept: LAB | Facility: CLINIC | Age: 69
End: 2019-02-18

## 2019-02-18 ENCOUNTER — OFFICE VISIT - HEALTHEAST (OUTPATIENT)
Dept: FAMILY MEDICINE | Facility: CLINIC | Age: 69
End: 2019-02-18

## 2019-02-18 DIAGNOSIS — E11.65 TYPE 2 DIABETES MELLITUS WITH HYPERGLYCEMIA (H): ICD-10-CM

## 2019-02-18 DIAGNOSIS — M79.7 FIBROMYALGIA: ICD-10-CM

## 2019-02-18 DIAGNOSIS — D50.0 IRON DEFICIENCY ANEMIA DUE TO CHRONIC BLOOD LOSS: ICD-10-CM

## 2019-02-18 DIAGNOSIS — I48.91 ATRIAL FIBRILLATION (H): ICD-10-CM

## 2019-02-18 DIAGNOSIS — G89.4 CHRONIC PAIN SYNDROME: ICD-10-CM

## 2019-02-18 LAB — INR PPP: 2.3 (ref 0.9–1.1)

## 2019-03-05 ENCOUNTER — COMMUNICATION - HEALTHEAST (OUTPATIENT)
Dept: ANTICOAGULATION | Facility: CLINIC | Age: 69
End: 2019-03-05

## 2019-03-05 ENCOUNTER — COMMUNICATION - HEALTHEAST (OUTPATIENT)
Dept: FAMILY MEDICINE | Facility: CLINIC | Age: 69
End: 2019-03-05

## 2019-03-05 DIAGNOSIS — I48.91 ATRIAL FIBRILLATION (H): ICD-10-CM

## 2019-03-05 DIAGNOSIS — E11.65 TYPE 2 DIABETES MELLITUS WITH HYPERGLYCEMIA, UNSPECIFIED WHETHER LONG TERM INSULIN USE (H): ICD-10-CM

## 2019-03-05 DIAGNOSIS — J44.0 CHRONIC OBSTRUCTIVE PULMONARY DISEASE WITH ACUTE LOWER RESPIRATORY INFECTION (H): ICD-10-CM

## 2019-03-15 ENCOUNTER — COMMUNICATION - HEALTHEAST (OUTPATIENT)
Dept: FAMILY MEDICINE | Facility: CLINIC | Age: 69
End: 2019-03-15

## 2019-03-15 ENCOUNTER — OFFICE VISIT - HEALTHEAST (OUTPATIENT)
Dept: FAMILY MEDICINE | Facility: CLINIC | Age: 69
End: 2019-03-15

## 2019-03-15 ENCOUNTER — COMMUNICATION - HEALTHEAST (OUTPATIENT)
Dept: ANTICOAGULATION | Facility: CLINIC | Age: 69
End: 2019-03-15

## 2019-03-15 DIAGNOSIS — M54.6 TRIGGER POINT OF THORACIC REGION: ICD-10-CM

## 2019-03-15 DIAGNOSIS — G89.4 CHRONIC PAIN SYNDROME: ICD-10-CM

## 2019-03-15 DIAGNOSIS — J44.0 CHRONIC OBSTRUCTIVE PULMONARY DISEASE WITH ACUTE LOWER RESPIRATORY INFECTION (H): ICD-10-CM

## 2019-03-15 DIAGNOSIS — Z79.4 TYPE 2 DIABETES MELLITUS WITH HYPOGLYCEMIA WITHOUT COMA, WITH LONG-TERM CURRENT USE OF INSULIN (H): ICD-10-CM

## 2019-03-15 DIAGNOSIS — I48.91 ATRIAL FIBRILLATION (H): ICD-10-CM

## 2019-03-15 DIAGNOSIS — M79.7 FIBROMYALGIA: ICD-10-CM

## 2019-03-15 DIAGNOSIS — E11.649 TYPE 2 DIABETES MELLITUS WITH HYPOGLYCEMIA WITHOUT COMA, WITH LONG-TERM CURRENT USE OF INSULIN (H): ICD-10-CM

## 2019-03-15 LAB — INR PPP: 3.2 (ref 0.9–1.1)

## 2019-03-18 ENCOUNTER — COMMUNICATION - HEALTHEAST (OUTPATIENT)
Dept: CARDIOLOGY | Facility: CLINIC | Age: 69
End: 2019-03-18

## 2019-03-18 ENCOUNTER — COMMUNICATION - HEALTHEAST (OUTPATIENT)
Dept: FAMILY MEDICINE | Facility: CLINIC | Age: 69
End: 2019-03-18

## 2019-03-18 DIAGNOSIS — I48.92 ATRIAL FLUTTER, UNSPECIFIED TYPE (H): ICD-10-CM

## 2019-04-05 ENCOUNTER — COMMUNICATION - HEALTHEAST (OUTPATIENT)
Dept: ANTICOAGULATION | Facility: CLINIC | Age: 69
End: 2019-04-05

## 2019-04-06 ENCOUNTER — COMMUNICATION - HEALTHEAST (OUTPATIENT)
Dept: FAMILY MEDICINE | Facility: CLINIC | Age: 69
End: 2019-04-06

## 2019-04-06 DIAGNOSIS — E78.2 MIXED HYPERLIPIDEMIA: ICD-10-CM

## 2019-04-17 ENCOUNTER — OFFICE VISIT - HEALTHEAST (OUTPATIENT)
Dept: FAMILY MEDICINE | Facility: CLINIC | Age: 69
End: 2019-04-17

## 2019-04-17 ENCOUNTER — COMMUNICATION - HEALTHEAST (OUTPATIENT)
Dept: ANTICOAGULATION | Facility: CLINIC | Age: 69
End: 2019-04-17

## 2019-04-17 ENCOUNTER — COMMUNICATION - HEALTHEAST (OUTPATIENT)
Dept: FAMILY MEDICINE | Facility: CLINIC | Age: 69
End: 2019-04-17

## 2019-04-17 ENCOUNTER — AMBULATORY - HEALTHEAST (OUTPATIENT)
Dept: LAB | Facility: CLINIC | Age: 69
End: 2019-04-17

## 2019-04-17 DIAGNOSIS — B37.2 CANDIDAL INTERTRIGO: ICD-10-CM

## 2019-04-17 DIAGNOSIS — M54.6 TRIGGER POINT OF THORACIC REGION: ICD-10-CM

## 2019-04-17 DIAGNOSIS — M79.7 FIBROMYALGIA: ICD-10-CM

## 2019-04-17 DIAGNOSIS — G89.4 CHRONIC PAIN SYNDROME: ICD-10-CM

## 2019-04-17 DIAGNOSIS — I48.91 ATRIAL FIBRILLATION (H): ICD-10-CM

## 2019-04-17 DIAGNOSIS — R42 VERTIGO: ICD-10-CM

## 2019-04-17 LAB — INR PPP: 1.6 (ref 0.9–1.1)

## 2019-05-08 ENCOUNTER — COMMUNICATION - HEALTHEAST (OUTPATIENT)
Dept: ANTICOAGULATION | Facility: CLINIC | Age: 69
End: 2019-05-08

## 2019-05-11 ENCOUNTER — COMMUNICATION - HEALTHEAST (OUTPATIENT)
Dept: FAMILY MEDICINE | Facility: CLINIC | Age: 69
End: 2019-05-11

## 2019-05-11 DIAGNOSIS — R63.4 LOSS OF WEIGHT: ICD-10-CM

## 2019-05-11 DIAGNOSIS — K21.9 GERD (GASTROESOPHAGEAL REFLUX DISEASE): ICD-10-CM

## 2019-05-17 ENCOUNTER — OFFICE VISIT - HEALTHEAST (OUTPATIENT)
Dept: FAMILY MEDICINE | Facility: CLINIC | Age: 69
End: 2019-05-17

## 2019-05-17 ENCOUNTER — COMMUNICATION - HEALTHEAST (OUTPATIENT)
Dept: ANTICOAGULATION | Facility: CLINIC | Age: 69
End: 2019-05-17

## 2019-05-17 DIAGNOSIS — R60.0 BILATERAL LOWER EXTREMITY EDEMA: ICD-10-CM

## 2019-05-17 DIAGNOSIS — M79.7 FIBROMYALGIA: ICD-10-CM

## 2019-05-17 DIAGNOSIS — G89.4 CHRONIC PAIN SYNDROME: ICD-10-CM

## 2019-05-17 DIAGNOSIS — I48.91 ATRIAL FIBRILLATION (H): ICD-10-CM

## 2019-05-17 LAB — INR PPP: 3.1 (ref 0.9–1.1)

## 2019-06-03 ENCOUNTER — RECORDS - HEALTHEAST (OUTPATIENT)
Dept: GENERAL RADIOLOGY | Facility: CLINIC | Age: 69
End: 2019-06-03

## 2019-06-03 ENCOUNTER — OFFICE VISIT - HEALTHEAST (OUTPATIENT)
Dept: FAMILY MEDICINE | Facility: CLINIC | Age: 69
End: 2019-06-03

## 2019-06-03 ENCOUNTER — COMMUNICATION - HEALTHEAST (OUTPATIENT)
Dept: ANTICOAGULATION | Facility: CLINIC | Age: 69
End: 2019-06-03

## 2019-06-03 DIAGNOSIS — E11.42 DIABETIC POLYNEUROPATHY ASSOCIATED WITH TYPE 2 DIABETES MELLITUS (H): ICD-10-CM

## 2019-06-03 DIAGNOSIS — I48.91 ATRIAL FIBRILLATION (H): ICD-10-CM

## 2019-06-03 DIAGNOSIS — K27.9 PEPTIC ULCER: ICD-10-CM

## 2019-06-03 DIAGNOSIS — R60.0 LOCALIZED EDEMA: ICD-10-CM

## 2019-06-03 DIAGNOSIS — M79.7 FIBROMYALGIA: ICD-10-CM

## 2019-06-03 DIAGNOSIS — Z01.818 PREOP EXAMINATION: ICD-10-CM

## 2019-06-03 DIAGNOSIS — K59.00 CONSTIPATION, UNSPECIFIED CONSTIPATION TYPE: ICD-10-CM

## 2019-06-03 DIAGNOSIS — I10 ESSENTIAL HYPERTENSION: ICD-10-CM

## 2019-06-03 DIAGNOSIS — J43.9 PULMONARY EMPHYSEMA, UNSPECIFIED EMPHYSEMA TYPE (H): ICD-10-CM

## 2019-06-03 DIAGNOSIS — E78.2 MIXED HYPERLIPIDEMIA: ICD-10-CM

## 2019-06-03 DIAGNOSIS — D62 ANEMIA DUE TO BLOOD LOSS, ACUTE: ICD-10-CM

## 2019-06-03 DIAGNOSIS — Z01.818 ENCOUNTER FOR OTHER PREPROCEDURAL EXAMINATION: ICD-10-CM

## 2019-06-03 DIAGNOSIS — I48.92 ATRIAL FLUTTER, UNSPECIFIED TYPE (H): ICD-10-CM

## 2019-06-03 DIAGNOSIS — D12.6 BENIGN NEOPLASM OF COLON, UNSPECIFIED PART OF COLON: ICD-10-CM

## 2019-06-03 LAB
ALBUMIN SERPL-MCNC: 3.7 G/DL (ref 3.5–5)
ALP SERPL-CCNC: 86 U/L (ref 45–120)
ALT SERPL W P-5'-P-CCNC: 17 U/L (ref 0–45)
ANION GAP SERPL CALCULATED.3IONS-SCNC: 17 MMOL/L (ref 5–18)
AST SERPL W P-5'-P-CCNC: 17 U/L (ref 0–40)
ATRIAL RATE - MUSE: 73 BPM
BASOPHILS # BLD AUTO: 0 THOU/UL (ref 0–0.2)
BASOPHILS NFR BLD AUTO: 0 % (ref 0–2)
BILIRUB SERPL-MCNC: 0.3 MG/DL (ref 0–1)
BUN SERPL-MCNC: 15 MG/DL (ref 8–22)
CALCIUM SERPL-MCNC: 9.5 MG/DL (ref 8.5–10.5)
CHLORIDE BLD-SCNC: 105 MMOL/L (ref 98–107)
CHOLEST SERPL-MCNC: 160 MG/DL
CO2 SERPL-SCNC: 22 MMOL/L (ref 22–31)
CREAT SERPL-MCNC: 0.63 MG/DL (ref 0.6–1.1)
DIASTOLIC BLOOD PRESSURE - MUSE: NORMAL MMHG
EOSINOPHIL # BLD AUTO: 0.2 THOU/UL (ref 0–0.4)
EOSINOPHIL NFR BLD AUTO: 3 % (ref 0–6)
ERYTHROCYTE [DISTWIDTH] IN BLOOD BY AUTOMATED COUNT: 16.5 % (ref 11–14.5)
FASTING STATUS PATIENT QL REPORTED: YES
GFR SERPL CREATININE-BSD FRML MDRD: >60 ML/MIN/1.73M2
GLUCOSE BLD-MCNC: 100 MG/DL (ref 70–125)
HBA1C MFR BLD: 5.3 % (ref 3.5–6)
HCT VFR BLD AUTO: 35.3 % (ref 35–47)
HDLC SERPL-MCNC: 69 MG/DL
HGB BLD-MCNC: 11.6 G/DL (ref 12–16)
INR PPP: 3.2 (ref 0.9–1.1)
INTERPRETATION ECG - MUSE: NORMAL
LDLC SERPL CALC-MCNC: 79 MG/DL
LYMPHOCYTES # BLD AUTO: 1.8 THOU/UL (ref 0.8–4.4)
LYMPHOCYTES NFR BLD AUTO: 27 % (ref 20–40)
MCH RBC QN AUTO: 27.7 PG (ref 27–34)
MCHC RBC AUTO-ENTMCNC: 32.9 G/DL (ref 32–36)
MCV RBC AUTO: 84 FL (ref 80–100)
MONOCYTES # BLD AUTO: 0.6 THOU/UL (ref 0–0.9)
MONOCYTES NFR BLD AUTO: 8 % (ref 2–10)
NEUTROPHILS # BLD AUTO: 4.3 THOU/UL (ref 2–7.7)
NEUTROPHILS NFR BLD AUTO: 62 % (ref 50–70)
P AXIS - MUSE: 86 DEGREES
PLATELET # BLD AUTO: 240 THOU/UL (ref 140–440)
PMV BLD AUTO: 8.2 FL (ref 7–10)
POTASSIUM BLD-SCNC: 4.3 MMOL/L (ref 3.5–5)
PR INTERVAL - MUSE: 176 MS
PROT SERPL-MCNC: 6.7 G/DL (ref 6–8)
QRS DURATION - MUSE: 98 MS
QT - MUSE: 406 MS
QTC - MUSE: 447 MS
R AXIS - MUSE: -69 DEGREES
RBC # BLD AUTO: 4.2 MILL/UL (ref 3.8–5.4)
SODIUM SERPL-SCNC: 144 MMOL/L (ref 136–145)
SYSTOLIC BLOOD PRESSURE - MUSE: NORMAL MMHG
T AXIS - MUSE: 72 DEGREES
TRIGL SERPL-MCNC: 59 MG/DL
VENTRICULAR RATE- MUSE: 73 BPM
WBC: 7 THOU/UL (ref 4–11)

## 2019-06-10 ENCOUNTER — COMMUNICATION - HEALTHEAST (OUTPATIENT)
Dept: FAMILY MEDICINE | Facility: CLINIC | Age: 69
End: 2019-06-10

## 2019-06-12 ENCOUNTER — COMMUNICATION - HEALTHEAST (OUTPATIENT)
Dept: ADMINISTRATIVE | Facility: CLINIC | Age: 69
End: 2019-06-12

## 2019-06-13 ENCOUNTER — COMMUNICATION - HEALTHEAST (OUTPATIENT)
Dept: GERIATRIC MEDICINE | Facility: CLINIC | Age: 69
End: 2019-06-13

## 2019-06-14 ENCOUNTER — SURGERY - HEALTHEAST (OUTPATIENT)
Dept: SURGERY | Facility: HOSPITAL | Age: 69
End: 2019-06-14

## 2019-06-14 ENCOUNTER — ANESTHESIA - HEALTHEAST (OUTPATIENT)
Dept: SURGERY | Facility: HOSPITAL | Age: 69
End: 2019-06-14

## 2019-06-14 ENCOUNTER — COMMUNICATION - HEALTHEAST (OUTPATIENT)
Dept: ANTICOAGULATION | Facility: CLINIC | Age: 69
End: 2019-06-14

## 2019-06-17 ENCOUNTER — OFFICE VISIT - HEALTHEAST (OUTPATIENT)
Dept: FAMILY MEDICINE | Facility: CLINIC | Age: 69
End: 2019-06-17

## 2019-06-17 ENCOUNTER — COMMUNICATION - HEALTHEAST (OUTPATIENT)
Dept: ANTICOAGULATION | Facility: CLINIC | Age: 69
End: 2019-06-17

## 2019-06-17 DIAGNOSIS — F33.0 MILD EPISODE OF RECURRENT MAJOR DEPRESSIVE DISORDER (H): ICD-10-CM

## 2019-06-17 DIAGNOSIS — I48.91 ATRIAL FIBRILLATION (H): ICD-10-CM

## 2019-06-17 DIAGNOSIS — M54.6 TRIGGER POINT OF THORACIC REGION: ICD-10-CM

## 2019-06-17 DIAGNOSIS — G89.4 CHRONIC PAIN SYNDROME: ICD-10-CM

## 2019-06-17 DIAGNOSIS — M79.7 FIBROMYALGIA: ICD-10-CM

## 2019-06-17 LAB — INR PPP: 1.2 (ref 0.9–1.1)

## 2019-06-26 ENCOUNTER — COMMUNICATION - HEALTHEAST (OUTPATIENT)
Dept: ANTICOAGULATION | Facility: CLINIC | Age: 69
End: 2019-06-26

## 2019-07-11 ENCOUNTER — COMMUNICATION - HEALTHEAST (OUTPATIENT)
Dept: FAMILY MEDICINE | Facility: CLINIC | Age: 69
End: 2019-07-11

## 2019-07-11 DIAGNOSIS — E11.649 TYPE 2 DIABETES MELLITUS WITH HYPOGLYCEMIA WITHOUT COMA, WITHOUT LONG-TERM CURRENT USE OF INSULIN (H): ICD-10-CM

## 2019-07-17 ENCOUNTER — COMMUNICATION - HEALTHEAST (OUTPATIENT)
Dept: ANTICOAGULATION | Facility: CLINIC | Age: 69
End: 2019-07-17

## 2019-07-17 ENCOUNTER — OFFICE VISIT - HEALTHEAST (OUTPATIENT)
Dept: FAMILY MEDICINE | Facility: CLINIC | Age: 69
End: 2019-07-17

## 2019-07-17 DIAGNOSIS — I48.91 ATRIAL FIBRILLATION (H): ICD-10-CM

## 2019-07-17 DIAGNOSIS — G89.4 CHRONIC PAIN SYNDROME: ICD-10-CM

## 2019-07-17 DIAGNOSIS — L98.9 SKIN LESION: ICD-10-CM

## 2019-07-17 DIAGNOSIS — M79.7 FIBROMYALGIA: ICD-10-CM

## 2019-07-17 DIAGNOSIS — E11.649 TYPE 2 DIABETES MELLITUS WITH HYPOGLYCEMIA WITHOUT COMA, WITHOUT LONG-TERM CURRENT USE OF INSULIN (H): ICD-10-CM

## 2019-07-17 LAB — INR PPP: 2.6 (ref 0.9–1.1)

## 2019-07-18 ENCOUNTER — COMMUNICATION - HEALTHEAST (OUTPATIENT)
Dept: FAMILY MEDICINE | Facility: CLINIC | Age: 69
End: 2019-07-18

## 2019-07-18 DIAGNOSIS — L91.8 SKIN TAG: ICD-10-CM

## 2019-07-24 ENCOUNTER — COMMUNICATION - HEALTHEAST (OUTPATIENT)
Dept: FAMILY MEDICINE | Facility: CLINIC | Age: 69
End: 2019-07-24

## 2019-07-24 DIAGNOSIS — E11.9 DM (DIABETES MELLITUS) (H): ICD-10-CM

## 2019-07-24 RX ORDER — PEN NEEDLE, DIABETIC 31 GX5/16"
NEEDLE, DISPOSABLE MISCELLANEOUS
Qty: 400 EACH | Refills: 3 | Status: SHIPPED | OUTPATIENT
Start: 2019-07-24

## 2019-08-02 ENCOUNTER — RECORDS - HEALTHEAST (OUTPATIENT)
Dept: ADMINISTRATIVE | Facility: OTHER | Age: 69
End: 2019-08-02

## 2019-08-08 ENCOUNTER — COMMUNICATION - HEALTHEAST (OUTPATIENT)
Dept: FAMILY MEDICINE | Facility: CLINIC | Age: 69
End: 2019-08-08

## 2019-08-08 DIAGNOSIS — J44.1 COPD EXACERBATION (H): ICD-10-CM

## 2019-08-13 ENCOUNTER — RECORDS - HEALTHEAST (OUTPATIENT)
Dept: ADMINISTRATIVE | Facility: OTHER | Age: 69
End: 2019-08-13

## 2019-08-16 ENCOUNTER — OFFICE VISIT - HEALTHEAST (OUTPATIENT)
Dept: FAMILY MEDICINE | Facility: CLINIC | Age: 69
End: 2019-08-16

## 2019-08-16 ENCOUNTER — COMMUNICATION - HEALTHEAST (OUTPATIENT)
Dept: ANTICOAGULATION | Facility: CLINIC | Age: 69
End: 2019-08-16

## 2019-08-16 DIAGNOSIS — Z23 NEED FOR SHINGLES VACCINE: ICD-10-CM

## 2019-08-16 DIAGNOSIS — G89.4 CHRONIC PAIN SYNDROME: ICD-10-CM

## 2019-08-16 DIAGNOSIS — Z23 NEED FOR TETANUS BOOSTER: ICD-10-CM

## 2019-08-16 DIAGNOSIS — I48.91 ATRIAL FIBRILLATION (H): ICD-10-CM

## 2019-08-16 DIAGNOSIS — E11.649 TYPE 2 DIABETES MELLITUS WITH HYPOGLYCEMIA WITHOUT COMA, WITHOUT LONG-TERM CURRENT USE OF INSULIN (H): ICD-10-CM

## 2019-08-16 DIAGNOSIS — Z79.01 LONG TERM CURRENT USE OF ANTICOAGULANT THERAPY: ICD-10-CM

## 2019-08-16 DIAGNOSIS — N39.46 MIXED STRESS AND URGE URINARY INCONTINENCE: ICD-10-CM

## 2019-08-16 LAB
AMPHETAMINES UR QL SCN: ABNORMAL
BARBITURATES UR QL: ABNORMAL
BENZODIAZ UR QL: ABNORMAL
CANNABINOIDS UR QL SCN: ABNORMAL
COCAINE UR QL: ABNORMAL
CREAT UR-MCNC: 156.7 MG/DL
CREAT UR-MCNC: 156.7 MG/DL
INR PPP: 1.5 (ref 0.9–1.1)
MICROALBUMIN UR-MCNC: 1.7 MG/DL (ref 0–1.99)
MICROALBUMIN/CREAT UR: 10.8 MG/G
OPIATES UR QL SCN: ABNORMAL
OXYCODONE UR QL: ABNORMAL
PCP UR QL SCN: ABNORMAL

## 2019-08-19 ENCOUNTER — COMMUNICATION - HEALTHEAST (OUTPATIENT)
Dept: GERIATRIC MEDICINE | Facility: CLINIC | Age: 69
End: 2019-08-19

## 2019-08-22 ENCOUNTER — COMMUNICATION - HEALTHEAST (OUTPATIENT)
Dept: GERIATRIC MEDICINE | Facility: CLINIC | Age: 69
End: 2019-08-22

## 2019-08-27 ENCOUNTER — RECORDS - HEALTHEAST (OUTPATIENT)
Dept: ADMINISTRATIVE | Facility: OTHER | Age: 69
End: 2019-08-27

## 2019-08-28 ENCOUNTER — RECORDS - HEALTHEAST (OUTPATIENT)
Dept: ADMINISTRATIVE | Facility: OTHER | Age: 69
End: 2019-08-28

## 2019-09-06 ENCOUNTER — COMMUNICATION - HEALTHEAST (OUTPATIENT)
Dept: ANTICOAGULATION | Facility: CLINIC | Age: 69
End: 2019-09-06

## 2019-09-11 ENCOUNTER — RECORDS - HEALTHEAST (OUTPATIENT)
Dept: ADMINISTRATIVE | Facility: OTHER | Age: 69
End: 2019-09-11

## 2019-09-17 ENCOUNTER — OFFICE VISIT - HEALTHEAST (OUTPATIENT)
Dept: FAMILY MEDICINE | Facility: CLINIC | Age: 69
End: 2019-09-17

## 2019-09-17 ENCOUNTER — COMMUNICATION - HEALTHEAST (OUTPATIENT)
Dept: FAMILY MEDICINE | Facility: CLINIC | Age: 69
End: 2019-09-17

## 2019-09-17 ENCOUNTER — COMMUNICATION - HEALTHEAST (OUTPATIENT)
Dept: ANTICOAGULATION | Facility: CLINIC | Age: 69
End: 2019-09-17

## 2019-09-17 DIAGNOSIS — M79.7 FIBROMYALGIA: ICD-10-CM

## 2019-09-17 DIAGNOSIS — G89.4 CHRONIC PAIN SYNDROME: ICD-10-CM

## 2019-09-17 DIAGNOSIS — I48.91 ATRIAL FIBRILLATION (H): ICD-10-CM

## 2019-09-17 LAB
AMPHETAMINES UR QL SCN: ABNORMAL
BARBITURATES UR QL: ABNORMAL
BENZODIAZ UR QL: ABNORMAL
CANNABINOIDS UR QL SCN: ABNORMAL
COCAINE UR QL: ABNORMAL
CREAT UR-MCNC: 25.8 MG/DL
INR PPP: 2.7 (ref 0.9–1.1)
OPIATES UR QL SCN: ABNORMAL
OXYCODONE UR QL: ABNORMAL
PCP UR QL SCN: ABNORMAL

## 2019-09-17 ASSESSMENT — MIFFLIN-ST. JEOR: SCORE: 1273.39

## 2019-09-18 ENCOUNTER — OFFICE VISIT - HEALTHEAST (OUTPATIENT)
Dept: CARDIOLOGY | Facility: CLINIC | Age: 69
End: 2019-09-18

## 2019-09-18 DIAGNOSIS — I48.92 ATRIAL FLUTTER, UNSPECIFIED TYPE (H): ICD-10-CM

## 2019-09-18 DIAGNOSIS — I48.20 CHRONIC A-FIB (H): ICD-10-CM

## 2019-09-18 DIAGNOSIS — I35.0 NONRHEUMATIC AORTIC VALVE STENOSIS: ICD-10-CM

## 2019-09-18 ASSESSMENT — MIFFLIN-ST. JEOR: SCORE: 1282.46

## 2019-10-07 ENCOUNTER — COMMUNICATION - HEALTHEAST (OUTPATIENT)
Dept: SCHEDULING | Facility: CLINIC | Age: 69
End: 2019-10-07

## 2019-10-07 ENCOUNTER — AMBULATORY - HEALTHEAST (OUTPATIENT)
Dept: FAMILY MEDICINE | Facility: CLINIC | Age: 69
End: 2019-10-07

## 2019-10-07 DIAGNOSIS — R42 VERTIGO: ICD-10-CM

## 2019-10-15 ENCOUNTER — COMMUNICATION - HEALTHEAST (OUTPATIENT)
Dept: GERIATRIC MEDICINE | Facility: CLINIC | Age: 69
End: 2019-10-15

## 2019-10-15 ENCOUNTER — COMMUNICATION - HEALTHEAST (OUTPATIENT)
Dept: ANTICOAGULATION | Facility: CLINIC | Age: 69
End: 2019-10-15

## 2019-10-15 ENCOUNTER — OFFICE VISIT - HEALTHEAST (OUTPATIENT)
Dept: FAMILY MEDICINE | Facility: CLINIC | Age: 69
End: 2019-10-15

## 2019-10-15 DIAGNOSIS — M79.7 FIBROMYALGIA: ICD-10-CM

## 2019-10-15 DIAGNOSIS — G89.4 CHRONIC PAIN SYNDROME: ICD-10-CM

## 2019-10-15 DIAGNOSIS — E11.9 TYPE 2 DIABETES MELLITUS (H): ICD-10-CM

## 2019-10-15 DIAGNOSIS — J44.0 CHRONIC OBSTRUCTIVE PULMONARY DISEASE WITH ACUTE LOWER RESPIRATORY INFECTION (H): ICD-10-CM

## 2019-10-15 DIAGNOSIS — H10.33 ACUTE BACTERIAL CONJUNCTIVITIS OF BOTH EYES: ICD-10-CM

## 2019-10-15 DIAGNOSIS — J44.1 COPD EXACERBATION (H): ICD-10-CM

## 2019-10-15 DIAGNOSIS — K21.9 GERD (GASTROESOPHAGEAL REFLUX DISEASE): ICD-10-CM

## 2019-10-15 LAB — INR PPP: 2.7 (ref 0.9–1.1)

## 2019-10-15 ASSESSMENT — MIFFLIN-ST. JEOR: SCORE: 1282.46

## 2019-10-22 ENCOUNTER — COMMUNICATION - HEALTHEAST (OUTPATIENT)
Dept: GERIATRIC MEDICINE | Facility: CLINIC | Age: 69
End: 2019-10-22

## 2019-10-23 ENCOUNTER — RECORDS - HEALTHEAST (OUTPATIENT)
Dept: ADMINISTRATIVE | Facility: OTHER | Age: 69
End: 2019-10-23

## 2019-10-27 ENCOUNTER — COMMUNICATION - HEALTHEAST (OUTPATIENT)
Dept: FAMILY MEDICINE | Facility: CLINIC | Age: 69
End: 2019-10-27

## 2019-10-27 DIAGNOSIS — D50.0 IRON DEFICIENCY ANEMIA DUE TO CHRONIC BLOOD LOSS: ICD-10-CM

## 2019-10-30 ENCOUNTER — COMMUNICATION - HEALTHEAST (OUTPATIENT)
Dept: FAMILY MEDICINE | Facility: CLINIC | Age: 69
End: 2019-10-30

## 2019-10-30 DIAGNOSIS — E11.649 TYPE 2 DIABETES MELLITUS WITH HYPOGLYCEMIA WITHOUT COMA, WITHOUT LONG-TERM CURRENT USE OF INSULIN (H): ICD-10-CM

## 2019-11-05 ENCOUNTER — COMMUNICATION - HEALTHEAST (OUTPATIENT)
Dept: FAMILY MEDICINE | Facility: CLINIC | Age: 69
End: 2019-11-05

## 2019-11-05 DIAGNOSIS — E11.649 TYPE 2 DIABETES MELLITUS WITH HYPOGLYCEMIA WITHOUT COMA, WITH LONG-TERM CURRENT USE OF INSULIN (H): ICD-10-CM

## 2019-11-05 DIAGNOSIS — Z79.4 TYPE 2 DIABETES MELLITUS WITH HYPOGLYCEMIA WITHOUT COMA, WITH LONG-TERM CURRENT USE OF INSULIN (H): ICD-10-CM

## 2019-11-05 DIAGNOSIS — I48.0 PAROXYSMAL ATRIAL FIBRILLATION (H): ICD-10-CM

## 2019-11-06 ENCOUNTER — COMMUNICATION - HEALTHEAST (OUTPATIENT)
Dept: GERIATRIC MEDICINE | Facility: CLINIC | Age: 69
End: 2019-11-06

## 2019-11-07 ENCOUNTER — COMMUNICATION - HEALTHEAST (OUTPATIENT)
Dept: ANTICOAGULATION | Facility: CLINIC | Age: 69
End: 2019-11-07

## 2019-11-07 ENCOUNTER — COMMUNICATION - HEALTHEAST (OUTPATIENT)
Dept: GERIATRIC MEDICINE | Facility: CLINIC | Age: 69
End: 2019-11-07

## 2019-11-07 DIAGNOSIS — I48.91 ATRIAL FIBRILLATION (H): ICD-10-CM

## 2019-11-07 DIAGNOSIS — I48.92 ATRIAL FLUTTER, UNSPECIFIED TYPE (H): ICD-10-CM

## 2019-11-07 DIAGNOSIS — I48.20 CHRONIC A-FIB (H): ICD-10-CM

## 2019-11-07 ASSESSMENT — ACTIVITIES OF DAILY LIVING (ADL): DEPENDENT_IADLS:: CLEANING;COOKING;LAUNDRY;MEAL PREPARATION

## 2019-11-14 ENCOUNTER — COMMUNICATION - HEALTHEAST (OUTPATIENT)
Dept: GERIATRIC MEDICINE | Facility: CLINIC | Age: 69
End: 2019-11-14

## 2019-11-15 ENCOUNTER — COMMUNICATION - HEALTHEAST (OUTPATIENT)
Dept: GERIATRIC MEDICINE | Facility: CLINIC | Age: 69
End: 2019-11-15

## 2019-11-18 ENCOUNTER — OFFICE VISIT - HEALTHEAST (OUTPATIENT)
Dept: FAMILY MEDICINE | Facility: CLINIC | Age: 69
End: 2019-11-18

## 2019-11-18 ENCOUNTER — COMMUNICATION - HEALTHEAST (OUTPATIENT)
Dept: ANTICOAGULATION | Facility: CLINIC | Age: 69
End: 2019-11-18

## 2019-11-18 DIAGNOSIS — M79.7 FIBROMYALGIA: ICD-10-CM

## 2019-11-18 DIAGNOSIS — G89.4 CHRONIC PAIN SYNDROME: ICD-10-CM

## 2019-11-18 DIAGNOSIS — I48.20 CHRONIC A-FIB (H): ICD-10-CM

## 2019-11-18 LAB — INR PPP: 4.9 (ref 0.9–1.1)

## 2019-11-18 ASSESSMENT — MIFFLIN-ST. JEOR: SCORE: 1247.53

## 2019-11-18 ASSESSMENT — PATIENT HEALTH QUESTIONNAIRE - PHQ9: SUM OF ALL RESPONSES TO PHQ QUESTIONS 1-9: 4

## 2019-12-02 ENCOUNTER — COMMUNICATION - HEALTHEAST (OUTPATIENT)
Dept: ANTICOAGULATION | Facility: CLINIC | Age: 69
End: 2019-12-02

## 2019-12-05 ENCOUNTER — COMMUNICATION - HEALTHEAST (OUTPATIENT)
Dept: SCHEDULING | Facility: CLINIC | Age: 69
End: 2019-12-05

## 2019-12-06 ENCOUNTER — AMBULATORY - HEALTHEAST (OUTPATIENT)
Dept: FAMILY MEDICINE | Facility: CLINIC | Age: 69
End: 2019-12-06

## 2019-12-06 DIAGNOSIS — J44.1 COPD EXACERBATION (H): ICD-10-CM

## 2019-12-06 DIAGNOSIS — J06.9 UPPER RESPIRATORY TRACT INFECTION, UNSPECIFIED TYPE: ICD-10-CM

## 2019-12-16 ENCOUNTER — COMMUNICATION - HEALTHEAST (OUTPATIENT)
Dept: ANTICOAGULATION | Facility: CLINIC | Age: 69
End: 2019-12-16

## 2019-12-16 ENCOUNTER — OFFICE VISIT - HEALTHEAST (OUTPATIENT)
Dept: FAMILY MEDICINE | Facility: CLINIC | Age: 69
End: 2019-12-16

## 2019-12-16 DIAGNOSIS — I48.91 ATRIAL FIBRILLATION (H): ICD-10-CM

## 2019-12-16 DIAGNOSIS — M21.612 BUNION, LEFT: ICD-10-CM

## 2019-12-16 DIAGNOSIS — M54.6 TRIGGER POINT OF THORACIC REGION: ICD-10-CM

## 2019-12-16 DIAGNOSIS — M79.7 FIBROMYALGIA: ICD-10-CM

## 2019-12-16 DIAGNOSIS — G89.4 CHRONIC PAIN SYNDROME: ICD-10-CM

## 2019-12-16 DIAGNOSIS — I48.20 CHRONIC A-FIB (H): ICD-10-CM

## 2019-12-16 LAB — INR PPP: 4.3 (ref 0.9–1.1)

## 2019-12-18 ENCOUNTER — COMMUNICATION - HEALTHEAST (OUTPATIENT)
Dept: GERIATRIC MEDICINE | Facility: CLINIC | Age: 69
End: 2019-12-18

## 2019-12-26 ENCOUNTER — COMMUNICATION - HEALTHEAST (OUTPATIENT)
Dept: ANTICOAGULATION | Facility: CLINIC | Age: 69
End: 2019-12-26

## 2020-01-07 ENCOUNTER — COMMUNICATION - HEALTHEAST (OUTPATIENT)
Dept: GERIATRIC MEDICINE | Facility: CLINIC | Age: 70
End: 2020-01-07

## 2020-01-17 ENCOUNTER — AMBULATORY - HEALTHEAST (OUTPATIENT)
Dept: LAB | Facility: CLINIC | Age: 70
End: 2020-01-17

## 2020-01-17 ENCOUNTER — OFFICE VISIT - HEALTHEAST (OUTPATIENT)
Dept: FAMILY MEDICINE | Facility: CLINIC | Age: 70
End: 2020-01-17

## 2020-01-17 ENCOUNTER — COMMUNICATION - HEALTHEAST (OUTPATIENT)
Dept: ANTICOAGULATION | Facility: CLINIC | Age: 70
End: 2020-01-17

## 2020-01-17 DIAGNOSIS — G89.4 CHRONIC PAIN SYNDROME: ICD-10-CM

## 2020-01-17 DIAGNOSIS — F33.1 MODERATE EPISODE OF RECURRENT MAJOR DEPRESSIVE DISORDER (H): ICD-10-CM

## 2020-01-17 DIAGNOSIS — I10 ESSENTIAL HYPERTENSION: ICD-10-CM

## 2020-01-17 DIAGNOSIS — J44.1 COPD EXACERBATION (H): ICD-10-CM

## 2020-01-17 DIAGNOSIS — M17.0 PRIMARY OSTEOARTHRITIS OF BOTH KNEES: ICD-10-CM

## 2020-01-17 DIAGNOSIS — K29.00 ACUTE GASTRITIS WITHOUT HEMORRHAGE, UNSPECIFIED GASTRITIS TYPE: ICD-10-CM

## 2020-01-17 DIAGNOSIS — E78.2 MIXED HYPERLIPIDEMIA: ICD-10-CM

## 2020-01-17 DIAGNOSIS — I48.20 CHRONIC A-FIB (H): ICD-10-CM

## 2020-01-17 DIAGNOSIS — M54.6 TRIGGER POINT OF THORACIC REGION: ICD-10-CM

## 2020-01-17 DIAGNOSIS — Z23 FLU VACCINE NEED: ICD-10-CM

## 2020-01-17 DIAGNOSIS — E11.649 TYPE 2 DIABETES MELLITUS WITH HYPOGLYCEMIA WITHOUT COMA, WITHOUT LONG-TERM CURRENT USE OF INSULIN (H): ICD-10-CM

## 2020-01-17 LAB — INR PPP: 4.1 (ref 0.9–1.1)

## 2020-01-21 ENCOUNTER — COMMUNICATION - HEALTHEAST (OUTPATIENT)
Dept: ANTICOAGULATION | Facility: CLINIC | Age: 70
End: 2020-01-21

## 2020-01-21 ENCOUNTER — AMBULATORY - HEALTHEAST (OUTPATIENT)
Dept: LAB | Facility: CLINIC | Age: 70
End: 2020-01-21

## 2020-01-21 DIAGNOSIS — I48.20 CHRONIC A-FIB (H): ICD-10-CM

## 2020-01-21 LAB
ALBUMIN SERPL-MCNC: 3.8 G/DL (ref 3.5–5)
ALP SERPL-CCNC: 103 U/L (ref 45–120)
ALT SERPL W P-5'-P-CCNC: 16 U/L (ref 0–45)
ANION GAP SERPL CALCULATED.3IONS-SCNC: 13 MMOL/L (ref 5–18)
AST SERPL W P-5'-P-CCNC: 19 U/L (ref 0–40)
BASOPHILS # BLD AUTO: 0 THOU/UL (ref 0–0.2)
BASOPHILS NFR BLD AUTO: 1 % (ref 0–2)
BILIRUB SERPL-MCNC: 0.4 MG/DL (ref 0–1)
BUN SERPL-MCNC: 13 MG/DL (ref 8–28)
CALCIUM SERPL-MCNC: 9.5 MG/DL (ref 8.5–10.5)
CHLORIDE BLD-SCNC: 103 MMOL/L (ref 98–107)
CHOLEST SERPL-MCNC: 179 MG/DL
CO2 SERPL-SCNC: 27 MMOL/L (ref 22–31)
CREAT SERPL-MCNC: 0.64 MG/DL (ref 0.6–1.1)
EOSINOPHIL # BLD AUTO: 0.2 THOU/UL (ref 0–0.4)
EOSINOPHIL NFR BLD AUTO: 3 % (ref 0–6)
ERYTHROCYTE [DISTWIDTH] IN BLOOD BY AUTOMATED COUNT: 13.2 % (ref 11–14.5)
FASTING STATUS PATIENT QL REPORTED: YES
GFR SERPL CREATININE-BSD FRML MDRD: >60 ML/MIN/1.73M2
GLUCOSE BLD-MCNC: 92 MG/DL (ref 70–125)
HBA1C MFR BLD: 5.9 % (ref 3.5–6)
HCT VFR BLD AUTO: 39.4 % (ref 35–47)
HDLC SERPL-MCNC: 73 MG/DL
HGB BLD-MCNC: 12.9 G/DL (ref 12–16)
INR PPP: 3.9 (ref 0.9–1.1)
LDLC SERPL CALC-MCNC: 83 MG/DL
LYMPHOCYTES # BLD AUTO: 1.8 THOU/UL (ref 0.8–4.4)
LYMPHOCYTES NFR BLD AUTO: 24 % (ref 20–40)
MCH RBC QN AUTO: 30.3 PG (ref 27–34)
MCHC RBC AUTO-ENTMCNC: 32.7 G/DL (ref 32–36)
MCV RBC AUTO: 93 FL (ref 80–100)
MONOCYTES # BLD AUTO: 0.6 THOU/UL (ref 0–0.9)
MONOCYTES NFR BLD AUTO: 8 % (ref 2–10)
NEUTROPHILS # BLD AUTO: 4.9 THOU/UL (ref 2–7.7)
NEUTROPHILS NFR BLD AUTO: 66 % (ref 50–70)
PLATELET # BLD AUTO: 220 THOU/UL (ref 140–440)
PMV BLD AUTO: 8.2 FL (ref 7–10)
POTASSIUM BLD-SCNC: 4.5 MMOL/L (ref 3.5–5)
PROT SERPL-MCNC: 6.9 G/DL (ref 6–8)
RBC # BLD AUTO: 4.25 MILL/UL (ref 3.8–5.4)
SODIUM SERPL-SCNC: 143 MMOL/L (ref 136–145)
TRIGL SERPL-MCNC: 117 MG/DL
WBC: 7.5 THOU/UL (ref 4–11)

## 2020-02-03 ENCOUNTER — AMBULATORY - HEALTHEAST (OUTPATIENT)
Dept: LAB | Facility: CLINIC | Age: 70
End: 2020-02-03

## 2020-02-03 ENCOUNTER — COMMUNICATION - HEALTHEAST (OUTPATIENT)
Dept: FAMILY MEDICINE | Facility: CLINIC | Age: 70
End: 2020-02-03

## 2020-02-03 ENCOUNTER — COMMUNICATION - HEALTHEAST (OUTPATIENT)
Dept: ANTICOAGULATION | Facility: CLINIC | Age: 70
End: 2020-02-03

## 2020-02-03 DIAGNOSIS — I48.20 CHRONIC A-FIB (H): ICD-10-CM

## 2020-02-03 DIAGNOSIS — I48.91 ATRIAL FIBRILLATION (H): ICD-10-CM

## 2020-02-03 LAB — INR PPP: 1.5 (ref 0.9–1.1)

## 2020-02-17 ENCOUNTER — AMBULATORY - HEALTHEAST (OUTPATIENT)
Dept: LAB | Facility: CLINIC | Age: 70
End: 2020-02-17

## 2020-02-17 ENCOUNTER — OFFICE VISIT - HEALTHEAST (OUTPATIENT)
Dept: FAMILY MEDICINE | Facility: CLINIC | Age: 70
End: 2020-02-17

## 2020-02-17 ENCOUNTER — COMMUNICATION - HEALTHEAST (OUTPATIENT)
Dept: ANTICOAGULATION | Facility: CLINIC | Age: 70
End: 2020-02-17

## 2020-02-17 DIAGNOSIS — I48.20 CHRONIC A-FIB (H): ICD-10-CM

## 2020-02-17 DIAGNOSIS — M79.7 FIBROMYALGIA: ICD-10-CM

## 2020-02-17 DIAGNOSIS — G89.4 CHRONIC PAIN SYNDROME: ICD-10-CM

## 2020-02-17 LAB — INR PPP: 2.4 (ref 0.9–1.1)

## 2020-02-19 ENCOUNTER — COMMUNICATION - HEALTHEAST (OUTPATIENT)
Dept: GERIATRIC MEDICINE | Facility: CLINIC | Age: 70
End: 2020-02-19

## 2020-03-03 ENCOUNTER — COMMUNICATION - HEALTHEAST (OUTPATIENT)
Dept: ANTICOAGULATION | Facility: CLINIC | Age: 70
End: 2020-03-03

## 2020-03-06 ENCOUNTER — COMMUNICATION - HEALTHEAST (OUTPATIENT)
Dept: FAMILY MEDICINE | Facility: CLINIC | Age: 70
End: 2020-03-06

## 2020-03-06 DIAGNOSIS — Z79.4 TYPE 2 DIABETES MELLITUS WITH HYPOGLYCEMIA WITHOUT COMA, WITH LONG-TERM CURRENT USE OF INSULIN (H): ICD-10-CM

## 2020-03-06 DIAGNOSIS — E11.649 TYPE 2 DIABETES MELLITUS WITH HYPOGLYCEMIA WITHOUT COMA, WITH LONG-TERM CURRENT USE OF INSULIN (H): ICD-10-CM

## 2020-03-16 ENCOUNTER — OFFICE VISIT - HEALTHEAST (OUTPATIENT)
Dept: FAMILY MEDICINE | Facility: CLINIC | Age: 70
End: 2020-03-16

## 2020-03-16 ENCOUNTER — COMMUNICATION - HEALTHEAST (OUTPATIENT)
Dept: ANTICOAGULATION | Facility: CLINIC | Age: 70
End: 2020-03-16

## 2020-03-16 DIAGNOSIS — M54.6 TRIGGER POINT OF THORACIC REGION: ICD-10-CM

## 2020-03-16 DIAGNOSIS — J20.9 ACUTE BRONCHITIS, UNSPECIFIED ORGANISM: ICD-10-CM

## 2020-03-16 DIAGNOSIS — I48.20 CHRONIC A-FIB (H): ICD-10-CM

## 2020-03-16 DIAGNOSIS — M79.7 FIBROMYALGIA: ICD-10-CM

## 2020-03-16 DIAGNOSIS — G89.4 CHRONIC PAIN SYNDROME: ICD-10-CM

## 2020-03-16 LAB — INR PPP: 3.8 (ref 0.9–1.1)

## 2020-03-31 ENCOUNTER — COMMUNICATION - HEALTHEAST (OUTPATIENT)
Dept: ANTICOAGULATION | Facility: CLINIC | Age: 70
End: 2020-03-31

## 2020-04-21 ENCOUNTER — COMMUNICATION - HEALTHEAST (OUTPATIENT)
Dept: ANTICOAGULATION | Facility: CLINIC | Age: 70
End: 2020-04-21

## 2020-04-21 ENCOUNTER — AMBULATORY - HEALTHEAST (OUTPATIENT)
Dept: LAB | Facility: CLINIC | Age: 70
End: 2020-04-21

## 2020-04-21 ENCOUNTER — OFFICE VISIT - HEALTHEAST (OUTPATIENT)
Dept: FAMILY MEDICINE | Facility: CLINIC | Age: 70
End: 2020-04-21

## 2020-04-21 DIAGNOSIS — I48.91 ATRIAL FIBRILLATION (H): ICD-10-CM

## 2020-04-21 DIAGNOSIS — M79.7 FIBROMYALGIA: ICD-10-CM

## 2020-04-21 DIAGNOSIS — I48.20 CHRONIC A-FIB (H): ICD-10-CM

## 2020-04-21 DIAGNOSIS — M54.6 TRIGGER POINT OF THORACIC REGION: ICD-10-CM

## 2020-04-21 DIAGNOSIS — G89.4 CHRONIC PAIN SYNDROME: ICD-10-CM

## 2020-04-21 DIAGNOSIS — K27.9 PEPTIC ULCER: ICD-10-CM

## 2020-04-21 LAB — INR PPP: 1.9 (ref 0.9–1.1)

## 2020-04-21 ASSESSMENT — PATIENT HEALTH QUESTIONNAIRE - PHQ9: SUM OF ALL RESPONSES TO PHQ QUESTIONS 1-9: 3

## 2020-04-27 ENCOUNTER — COMMUNICATION - HEALTHEAST (OUTPATIENT)
Dept: FAMILY MEDICINE | Facility: CLINIC | Age: 70
End: 2020-04-27

## 2020-04-27 DIAGNOSIS — J44.1 COPD EXACERBATION (H): ICD-10-CM

## 2020-04-29 ENCOUNTER — COMMUNICATION - HEALTHEAST (OUTPATIENT)
Dept: FAMILY MEDICINE | Facility: CLINIC | Age: 70
End: 2020-04-29

## 2020-04-29 DIAGNOSIS — E78.2 MIXED HYPERLIPIDEMIA: ICD-10-CM

## 2020-04-29 DIAGNOSIS — R42 VERTIGO: ICD-10-CM

## 2020-04-29 DIAGNOSIS — J44.1 COPD EXACERBATION (H): ICD-10-CM

## 2020-04-30 ENCOUNTER — AMBULATORY - HEALTHEAST (OUTPATIENT)
Dept: FAMILY MEDICINE | Facility: CLINIC | Age: 70
End: 2020-04-30

## 2020-04-30 DIAGNOSIS — J44.1 COPD EXACERBATION (H): ICD-10-CM

## 2020-05-04 ENCOUNTER — COMMUNICATION - HEALTHEAST (OUTPATIENT)
Dept: ANTICOAGULATION | Facility: CLINIC | Age: 70
End: 2020-05-04

## 2020-05-04 ENCOUNTER — AMBULATORY - HEALTHEAST (OUTPATIENT)
Dept: LAB | Facility: CLINIC | Age: 70
End: 2020-05-04

## 2020-05-04 DIAGNOSIS — I48.91 ATRIAL FIBRILLATION (H): ICD-10-CM

## 2020-05-04 DIAGNOSIS — I48.20 CHRONIC A-FIB (H): ICD-10-CM

## 2020-05-04 LAB — INR PPP: 1.8 (ref 0.9–1.1)

## 2020-05-21 ENCOUNTER — COMMUNICATION - HEALTHEAST (OUTPATIENT)
Dept: ANTICOAGULATION | Facility: CLINIC | Age: 70
End: 2020-05-21

## 2020-05-26 ENCOUNTER — OFFICE VISIT - HEALTHEAST (OUTPATIENT)
Dept: FAMILY MEDICINE | Facility: CLINIC | Age: 70
End: 2020-05-26

## 2020-05-26 ENCOUNTER — AMBULATORY - HEALTHEAST (OUTPATIENT)
Dept: LAB | Facility: CLINIC | Age: 70
End: 2020-05-26

## 2020-05-26 ENCOUNTER — COMMUNICATION - HEALTHEAST (OUTPATIENT)
Dept: SCHEDULING | Facility: CLINIC | Age: 70
End: 2020-05-26

## 2020-05-26 ENCOUNTER — COMMUNICATION - HEALTHEAST (OUTPATIENT)
Dept: FAMILY MEDICINE | Facility: CLINIC | Age: 70
End: 2020-05-26

## 2020-05-26 ENCOUNTER — COMMUNICATION - HEALTHEAST (OUTPATIENT)
Dept: ANTICOAGULATION | Facility: CLINIC | Age: 70
End: 2020-05-26

## 2020-05-26 DIAGNOSIS — M77.8 TENDINITIS OF LEFT WRIST: ICD-10-CM

## 2020-05-26 DIAGNOSIS — G89.4 CHRONIC PAIN SYNDROME: ICD-10-CM

## 2020-05-26 DIAGNOSIS — I48.20 CHRONIC A-FIB (H): ICD-10-CM

## 2020-05-26 DIAGNOSIS — E78.5 DYSLIPIDEMIA: ICD-10-CM

## 2020-05-26 DIAGNOSIS — M79.7 FIBROMYALGIA: ICD-10-CM

## 2020-05-26 DIAGNOSIS — D64.9 CHRONIC ANEMIA: ICD-10-CM

## 2020-05-26 DIAGNOSIS — E11.649 TYPE 2 DIABETES MELLITUS WITH HYPOGLYCEMIA WITHOUT COMA, WITHOUT LONG-TERM CURRENT USE OF INSULIN (H): ICD-10-CM

## 2020-05-26 LAB — INR PPP: 2.4 (ref 0.9–1.1)

## 2020-05-27 ENCOUNTER — COMMUNICATION - HEALTHEAST (OUTPATIENT)
Dept: FAMILY MEDICINE | Facility: CLINIC | Age: 70
End: 2020-05-27

## 2020-05-27 DIAGNOSIS — I48.0 PAROXYSMAL ATRIAL FIBRILLATION (H): ICD-10-CM

## 2020-06-14 ENCOUNTER — COMMUNICATION - HEALTHEAST (OUTPATIENT)
Dept: CARDIOLOGY | Facility: CLINIC | Age: 70
End: 2020-06-14

## 2020-06-14 DIAGNOSIS — I48.92 ATRIAL FLUTTER, UNSPECIFIED TYPE (H): ICD-10-CM

## 2020-06-17 ENCOUNTER — COMMUNICATION - HEALTHEAST (OUTPATIENT)
Dept: ANTICOAGULATION | Facility: CLINIC | Age: 70
End: 2020-06-17

## 2020-06-22 ENCOUNTER — OFFICE VISIT - HEALTHEAST (OUTPATIENT)
Dept: FAMILY MEDICINE | Facility: CLINIC | Age: 70
End: 2020-06-22

## 2020-06-22 ENCOUNTER — AMBULATORY - HEALTHEAST (OUTPATIENT)
Dept: LAB | Facility: CLINIC | Age: 70
End: 2020-06-22

## 2020-06-22 ENCOUNTER — COMMUNICATION - HEALTHEAST (OUTPATIENT)
Dept: ANTICOAGULATION | Facility: CLINIC | Age: 70
End: 2020-06-22

## 2020-06-22 DIAGNOSIS — M79.7 FIBROMYALGIA: ICD-10-CM

## 2020-06-22 DIAGNOSIS — E11.649 TYPE 2 DIABETES MELLITUS WITH HYPOGLYCEMIA WITHOUT COMA, WITHOUT LONG-TERM CURRENT USE OF INSULIN (H): ICD-10-CM

## 2020-06-22 DIAGNOSIS — G89.4 CHRONIC PAIN SYNDROME: ICD-10-CM

## 2020-06-22 DIAGNOSIS — I48.20 CHRONIC A-FIB (H): ICD-10-CM

## 2020-06-22 DIAGNOSIS — Z11.59 NEED FOR HEPATITIS C SCREENING TEST: ICD-10-CM

## 2020-06-22 DIAGNOSIS — E78.5 DYSLIPIDEMIA: ICD-10-CM

## 2020-06-22 LAB
ALBUMIN SERPL-MCNC: 3.5 G/DL (ref 3.5–5)
ALP SERPL-CCNC: 92 U/L (ref 45–120)
ALT SERPL W P-5'-P-CCNC: 18 U/L (ref 0–45)
AMPHETAMINES UR QL SCN: ABNORMAL
ANION GAP SERPL CALCULATED.3IONS-SCNC: 15 MMOL/L (ref 5–18)
AST SERPL W P-5'-P-CCNC: 15 U/L (ref 0–40)
BARBITURATES UR QL: ABNORMAL
BENZODIAZ UR QL: ABNORMAL
BILIRUB SERPL-MCNC: 0.3 MG/DL (ref 0–1)
BUN SERPL-MCNC: 14 MG/DL (ref 8–28)
CALCIUM SERPL-MCNC: 9.3 MG/DL (ref 8.5–10.5)
CANNABINOIDS UR QL SCN: ABNORMAL
CHLORIDE BLD-SCNC: 105 MMOL/L (ref 98–107)
CHOLEST SERPL-MCNC: 163 MG/DL
CO2 SERPL-SCNC: 21 MMOL/L (ref 22–31)
COCAINE UR QL: ABNORMAL
CREAT SERPL-MCNC: 0.57 MG/DL (ref 0.6–1.1)
CREAT UR-MCNC: 9.9 MG/DL
CREAT UR-MCNC: 9.9 MG/DL
FASTING STATUS PATIENT QL REPORTED: NO
GFR SERPL CREATININE-BSD FRML MDRD: >60 ML/MIN/1.73M2
GLUCOSE BLD-MCNC: 82 MG/DL (ref 70–125)
HBA1C MFR BLD: 5.3 % (ref 3.5–6)
HDLC SERPL-MCNC: 78 MG/DL
INR PPP: 2.3 (ref 0.9–1.1)
LDLC SERPL CALC-MCNC: 74 MG/DL
MICROALBUMIN UR-MCNC: 1.83 MG/DL (ref 0–1.99)
MICROALBUMIN/CREAT UR: 184.8 MG/G
OPIATES UR QL SCN: ABNORMAL
OXYCODONE UR QL: ABNORMAL
PCP UR QL SCN: ABNORMAL
POTASSIUM BLD-SCNC: 4.1 MMOL/L (ref 3.5–5)
PROT SERPL-MCNC: 6.5 G/DL (ref 6–8)
SODIUM SERPL-SCNC: 141 MMOL/L (ref 136–145)
TRIGL SERPL-MCNC: 56 MG/DL

## 2020-06-23 LAB — HCV AB SERPL QL IA: NEGATIVE

## 2020-06-28 ENCOUNTER — COMMUNICATION - HEALTHEAST (OUTPATIENT)
Dept: FAMILY MEDICINE | Facility: CLINIC | Age: 70
End: 2020-06-28

## 2020-06-28 DIAGNOSIS — I48.0 PAROXYSMAL ATRIAL FIBRILLATION (H): ICD-10-CM

## 2020-06-30 ENCOUNTER — COMMUNICATION - HEALTHEAST (OUTPATIENT)
Dept: GERIATRIC MEDICINE | Facility: CLINIC | Age: 70
End: 2020-06-30

## 2020-07-06 ENCOUNTER — COMMUNICATION - HEALTHEAST (OUTPATIENT)
Dept: FAMILY MEDICINE | Facility: CLINIC | Age: 70
End: 2020-07-06

## 2020-07-17 ENCOUNTER — RECORDS - HEALTHEAST (OUTPATIENT)
Dept: ADMINISTRATIVE | Facility: OTHER | Age: 70
End: 2020-07-17

## 2020-07-21 ENCOUNTER — COMMUNICATION - HEALTHEAST (OUTPATIENT)
Dept: ANTICOAGULATION | Facility: CLINIC | Age: 70
End: 2020-07-21

## 2020-07-21 ENCOUNTER — OFFICE VISIT - HEALTHEAST (OUTPATIENT)
Dept: FAMILY MEDICINE | Facility: CLINIC | Age: 70
End: 2020-07-21

## 2020-07-21 DIAGNOSIS — R60.0 LOCALIZED EDEMA: ICD-10-CM

## 2020-07-21 DIAGNOSIS — M54.6 TRIGGER POINT OF THORACIC REGION: ICD-10-CM

## 2020-07-21 DIAGNOSIS — I48.91 ATRIAL FIBRILLATION (H): ICD-10-CM

## 2020-07-21 DIAGNOSIS — G89.4 CHRONIC PAIN SYNDROME: ICD-10-CM

## 2020-07-21 DIAGNOSIS — I48.20 CHRONIC A-FIB (H): ICD-10-CM

## 2020-07-21 DIAGNOSIS — B37.2 CANDIDAL INTERTRIGO: ICD-10-CM

## 2020-07-21 LAB — INR PPP: 1.6 (ref 0.9–1.1)

## 2020-07-21 RX ORDER — NYSTATIN 100000/G
1 POWDER (GRAM) TOPICAL 2 TIMES DAILY PRN
Qty: 60 G | Refills: 3 | Status: SHIPPED | OUTPATIENT
Start: 2020-07-21 | End: 2023-09-24

## 2020-08-05 ENCOUNTER — COMMUNICATION - HEALTHEAST (OUTPATIENT)
Dept: FAMILY MEDICINE | Facility: CLINIC | Age: 70
End: 2020-08-05

## 2020-08-05 DIAGNOSIS — R60.0 LOCALIZED EDEMA: ICD-10-CM

## 2020-08-05 RX ORDER — FUROSEMIDE 20 MG
TABLET ORAL
Qty: 90 TABLET | Refills: 3 | Status: SHIPPED | OUTPATIENT
Start: 2020-08-05 | End: 2021-08-25

## 2020-08-11 ENCOUNTER — COMMUNICATION - HEALTHEAST (OUTPATIENT)
Dept: ANTICOAGULATION | Facility: CLINIC | Age: 70
End: 2020-08-11

## 2020-08-13 ENCOUNTER — RECORDS - HEALTHEAST (OUTPATIENT)
Dept: ADMINISTRATIVE | Facility: OTHER | Age: 70
End: 2020-08-13

## 2020-08-17 ENCOUNTER — COMMUNICATION - HEALTHEAST (OUTPATIENT)
Dept: ANTICOAGULATION | Facility: CLINIC | Age: 70
End: 2020-08-17

## 2020-08-17 ENCOUNTER — OFFICE VISIT - HEALTHEAST (OUTPATIENT)
Dept: FAMILY MEDICINE | Facility: CLINIC | Age: 70
End: 2020-08-17

## 2020-08-17 DIAGNOSIS — G89.4 CHRONIC PAIN SYNDROME: ICD-10-CM

## 2020-08-17 DIAGNOSIS — J43.9 PULMONARY EMPHYSEMA, UNSPECIFIED EMPHYSEMA TYPE (H): ICD-10-CM

## 2020-08-17 DIAGNOSIS — Z00.00 ENCOUNTER FOR MEDICARE ANNUAL WELLNESS EXAM: ICD-10-CM

## 2020-08-17 DIAGNOSIS — M79.7 FIBROMYALGIA: ICD-10-CM

## 2020-08-17 DIAGNOSIS — Z71.89 ADVANCED DIRECTIVES, COUNSELING/DISCUSSION: ICD-10-CM

## 2020-08-17 DIAGNOSIS — I48.20 CHRONIC A-FIB (H): ICD-10-CM

## 2020-08-17 DIAGNOSIS — Z78.0 ASYMPTOMATIC MENOPAUSE: ICD-10-CM

## 2020-08-17 DIAGNOSIS — E11.42 DIABETIC POLYNEUROPATHY ASSOCIATED WITH TYPE 2 DIABETES MELLITUS (H): ICD-10-CM

## 2020-08-17 DIAGNOSIS — M54.6 TRIGGER POINT OF THORACIC REGION: ICD-10-CM

## 2020-08-17 DIAGNOSIS — I10 ESSENTIAL HYPERTENSION: ICD-10-CM

## 2020-08-17 DIAGNOSIS — Z12.31 VISIT FOR SCREENING MAMMOGRAM: ICD-10-CM

## 2020-08-17 LAB — INR PPP: 1.6 (ref 0.9–1.1)

## 2020-08-17 ASSESSMENT — MIFFLIN-ST. JEOR: SCORE: 1186.06

## 2020-09-08 ENCOUNTER — COMMUNICATION - HEALTHEAST (OUTPATIENT)
Dept: SCHEDULING | Facility: CLINIC | Age: 70
End: 2020-09-08

## 2020-09-08 ENCOUNTER — OFFICE VISIT - HEALTHEAST (OUTPATIENT)
Dept: FAMILY MEDICINE | Facility: CLINIC | Age: 70
End: 2020-09-08

## 2020-09-08 DIAGNOSIS — J01.90 ACUTE SINUSITIS WITH COEXISTING CONDITION, NEED PROPHYLACTIC TREATMENT: ICD-10-CM

## 2020-09-11 ENCOUNTER — COMMUNICATION - HEALTHEAST (OUTPATIENT)
Dept: ANTICOAGULATION | Facility: CLINIC | Age: 70
End: 2020-09-11

## 2020-09-21 ENCOUNTER — COMMUNICATION - HEALTHEAST (OUTPATIENT)
Dept: ANTICOAGULATION | Facility: CLINIC | Age: 70
End: 2020-09-21

## 2020-09-21 ENCOUNTER — OFFICE VISIT - HEALTHEAST (OUTPATIENT)
Dept: FAMILY MEDICINE | Facility: CLINIC | Age: 70
End: 2020-09-21

## 2020-09-21 DIAGNOSIS — G89.4 CHRONIC PAIN SYNDROME: ICD-10-CM

## 2020-09-21 DIAGNOSIS — M54.6 TRIGGER POINT OF THORACIC REGION: ICD-10-CM

## 2020-09-21 DIAGNOSIS — M79.7 FIBROMYALGIA: ICD-10-CM

## 2020-09-21 DIAGNOSIS — K27.9 PEPTIC ULCER: ICD-10-CM

## 2020-09-21 DIAGNOSIS — F17.200 TOBACCO USE DISORDER: ICD-10-CM

## 2020-09-21 DIAGNOSIS — J01.90 ACUTE SINUSITIS TREATED WITH ANTIBIOTICS IN THE PAST 60 DAYS: ICD-10-CM

## 2020-09-21 DIAGNOSIS — I48.20 CHRONIC A-FIB (H): ICD-10-CM

## 2020-09-21 LAB — INR PPP: 1.9 (ref 0.9–1.1)

## 2020-09-21 ASSESSMENT — PATIENT HEALTH QUESTIONNAIRE - PHQ9: SUM OF ALL RESPONSES TO PHQ QUESTIONS 1-9: 4

## 2020-10-01 ENCOUNTER — RECORDS - HEALTHEAST (OUTPATIENT)
Dept: ADMINISTRATIVE | Facility: OTHER | Age: 70
End: 2020-10-01

## 2020-10-02 ENCOUNTER — COMMUNICATION - HEALTHEAST (OUTPATIENT)
Dept: ANTICOAGULATION | Facility: CLINIC | Age: 70
End: 2020-10-02

## 2020-10-13 ENCOUNTER — COMMUNICATION - HEALTHEAST (OUTPATIENT)
Dept: FAMILY MEDICINE | Facility: CLINIC | Age: 70
End: 2020-10-13

## 2020-10-13 DIAGNOSIS — E11.9 TYPE 2 DIABETES MELLITUS (H): ICD-10-CM

## 2020-10-15 ENCOUNTER — COMMUNICATION - HEALTHEAST (OUTPATIENT)
Dept: GERIATRIC MEDICINE | Facility: CLINIC | Age: 70
End: 2020-10-15

## 2020-10-15 ASSESSMENT — ACTIVITIES OF DAILY LIVING (ADL): DEPENDENT_IADLS:: CLEANING;LAUNDRY;SHOPPING;TRANSPORTATION

## 2020-10-16 ENCOUNTER — AMBULATORY - HEALTHEAST (OUTPATIENT)
Dept: ANTICOAGULATION | Facility: CLINIC | Age: 70
End: 2020-10-16

## 2020-10-16 DIAGNOSIS — I48.91 ATRIAL FIBRILLATION (H): ICD-10-CM

## 2020-10-17 ENCOUNTER — COMMUNICATION - HEALTHEAST (OUTPATIENT)
Dept: FAMILY MEDICINE | Facility: CLINIC | Age: 70
End: 2020-10-17

## 2020-10-17 DIAGNOSIS — D50.0 IRON DEFICIENCY ANEMIA DUE TO CHRONIC BLOOD LOSS: ICD-10-CM

## 2020-10-19 ENCOUNTER — OFFICE VISIT - HEALTHEAST (OUTPATIENT)
Dept: FAMILY MEDICINE | Facility: CLINIC | Age: 70
End: 2020-10-19

## 2020-10-19 ENCOUNTER — RECORDS - HEALTHEAST (OUTPATIENT)
Dept: MAMMOGRAPHY | Facility: CLINIC | Age: 70
End: 2020-10-19

## 2020-10-19 ENCOUNTER — AMBULATORY - HEALTHEAST (OUTPATIENT)
Dept: LAB | Facility: CLINIC | Age: 70
End: 2020-10-19

## 2020-10-19 ENCOUNTER — COMMUNICATION - HEALTHEAST (OUTPATIENT)
Dept: ANTICOAGULATION | Facility: CLINIC | Age: 70
End: 2020-10-19

## 2020-10-19 DIAGNOSIS — Z12.31 ENCOUNTER FOR SCREENING MAMMOGRAM FOR MALIGNANT NEOPLASM OF BREAST: ICD-10-CM

## 2020-10-19 DIAGNOSIS — I48.91 ATRIAL FIBRILLATION (H): ICD-10-CM

## 2020-10-19 DIAGNOSIS — M79.7 FIBROMYALGIA: ICD-10-CM

## 2020-10-19 DIAGNOSIS — G89.4 CHRONIC PAIN SYNDROME: ICD-10-CM

## 2020-10-19 DIAGNOSIS — K25.7 CHRONIC GASTRIC ULCER WITHOUT HEMORRHAGE AND WITHOUT PERFORATION: ICD-10-CM

## 2020-10-19 LAB — INR PPP: 1.8 (ref 0.9–1.1)

## 2020-10-26 ENCOUNTER — COMMUNICATION - HEALTHEAST (OUTPATIENT)
Dept: GERIATRIC MEDICINE | Facility: CLINIC | Age: 70
End: 2020-10-26

## 2020-11-09 ENCOUNTER — COMMUNICATION - HEALTHEAST (OUTPATIENT)
Dept: FAMILY MEDICINE | Facility: CLINIC | Age: 70
End: 2020-11-09

## 2020-11-11 ENCOUNTER — COMMUNICATION - HEALTHEAST (OUTPATIENT)
Dept: ANTICOAGULATION | Facility: CLINIC | Age: 70
End: 2020-11-11

## 2020-11-11 ENCOUNTER — OFFICE VISIT - HEALTHEAST (OUTPATIENT)
Dept: FAMILY MEDICINE | Facility: CLINIC | Age: 70
End: 2020-11-11

## 2020-11-11 DIAGNOSIS — Z79.4 TYPE 2 DIABETES MELLITUS WITH HYPOGLYCEMIA WITHOUT COMA, WITH LONG-TERM CURRENT USE OF INSULIN (H): ICD-10-CM

## 2020-11-11 DIAGNOSIS — E11.649 TYPE 2 DIABETES MELLITUS WITH HYPOGLYCEMIA WITHOUT COMA, WITH LONG-TERM CURRENT USE OF INSULIN (H): ICD-10-CM

## 2020-11-11 DIAGNOSIS — I48.91 ATRIAL FIBRILLATION (H): ICD-10-CM

## 2020-11-11 DIAGNOSIS — K92.1 BLACK STOOL: ICD-10-CM

## 2020-11-11 DIAGNOSIS — J44.1 COPD EXACERBATION (H): ICD-10-CM

## 2020-11-11 DIAGNOSIS — K27.9 PEPTIC ULCER: ICD-10-CM

## 2020-11-11 DIAGNOSIS — E78.2 MIXED HYPERLIPIDEMIA: ICD-10-CM

## 2020-11-11 LAB
ALBUMIN SERPL-MCNC: 3.8 G/DL (ref 3.5–5)
ALP SERPL-CCNC: 79 U/L (ref 45–120)
ALT SERPL W P-5'-P-CCNC: 14 U/L (ref 0–45)
ANION GAP SERPL CALCULATED.3IONS-SCNC: 14 MMOL/L (ref 5–18)
AST SERPL W P-5'-P-CCNC: 16 U/L (ref 0–40)
BASOPHILS # BLD AUTO: 0 THOU/UL (ref 0–0.2)
BASOPHILS NFR BLD AUTO: 1 % (ref 0–2)
BILIRUB SERPL-MCNC: 0.5 MG/DL (ref 0–1)
BUN SERPL-MCNC: 13 MG/DL (ref 8–28)
CALCIUM SERPL-MCNC: 9.5 MG/DL (ref 8.5–10.5)
CHLORIDE BLD-SCNC: 102 MMOL/L (ref 98–107)
CHOLEST SERPL-MCNC: 174 MG/DL
CO2 SERPL-SCNC: 25 MMOL/L (ref 22–31)
CREAT SERPL-MCNC: 0.62 MG/DL (ref 0.6–1.1)
EOSINOPHIL # BLD AUTO: 0.2 THOU/UL (ref 0–0.4)
EOSINOPHIL NFR BLD AUTO: 2 % (ref 0–6)
ERYTHROCYTE [DISTWIDTH] IN BLOOD BY AUTOMATED COUNT: 12.5 % (ref 11–14.5)
FASTING STATUS PATIENT QL REPORTED: NO
GFR SERPL CREATININE-BSD FRML MDRD: >60 ML/MIN/1.73M2
GLUCOSE BLD-MCNC: 79 MG/DL (ref 70–125)
HBA1C MFR BLD: 5.8 %
HCT VFR BLD AUTO: 39.5 % (ref 35–47)
HDLC SERPL-MCNC: 80 MG/DL
HGB BLD-MCNC: 12.8 G/DL (ref 12–16)
INR PPP: 1.3 (ref 0.9–1.1)
IRON SATN MFR SERPL: 12 % (ref 20–50)
IRON SERPL-MCNC: 58 UG/DL (ref 42–175)
LDLC SERPL CALC-MCNC: 81 MG/DL
LYMPHOCYTES # BLD AUTO: 2.5 THOU/UL (ref 0.8–4.4)
LYMPHOCYTES NFR BLD AUTO: 32 % (ref 20–40)
MCH RBC QN AUTO: 30.1 PG (ref 27–34)
MCHC RBC AUTO-ENTMCNC: 32.3 G/DL (ref 32–36)
MCV RBC AUTO: 93 FL (ref 80–100)
MONOCYTES # BLD AUTO: 0.7 THOU/UL (ref 0–0.9)
MONOCYTES NFR BLD AUTO: 8 % (ref 2–10)
NEUTROPHILS # BLD AUTO: 4.5 THOU/UL (ref 2–7.7)
NEUTROPHILS NFR BLD AUTO: 57 % (ref 50–70)
PLATELET # BLD AUTO: 229 THOU/UL (ref 140–440)
PMV BLD AUTO: 7.7 FL (ref 7–10)
POTASSIUM BLD-SCNC: 3.9 MMOL/L (ref 3.5–5)
PROT SERPL-MCNC: 6.7 G/DL (ref 6–8)
RBC # BLD AUTO: 4.25 MILL/UL (ref 3.8–5.4)
SODIUM SERPL-SCNC: 141 MMOL/L (ref 136–145)
TIBC SERPL-MCNC: 483 UG/DL (ref 313–563)
TRANSFERRIN SERPL-MCNC: 386 MG/DL (ref 212–360)
TRIGL SERPL-MCNC: 67 MG/DL
WBC: 7.9 THOU/UL (ref 4–11)

## 2020-11-11 RX ORDER — GABAPENTIN 600 MG/1
TABLET ORAL
Qty: 270 TABLET | Refills: 3 | Status: SHIPPED | OUTPATIENT
Start: 2020-11-11 | End: 2021-11-13

## 2020-11-23 ENCOUNTER — COMMUNICATION - HEALTHEAST (OUTPATIENT)
Dept: ANTICOAGULATION | Facility: CLINIC | Age: 70
End: 2020-11-23

## 2020-11-23 ENCOUNTER — OFFICE VISIT - HEALTHEAST (OUTPATIENT)
Dept: FAMILY MEDICINE | Facility: CLINIC | Age: 70
End: 2020-11-23

## 2020-11-23 DIAGNOSIS — G89.4 CHRONIC PAIN SYNDROME: ICD-10-CM

## 2020-11-23 DIAGNOSIS — I48.91 ATRIAL FIBRILLATION (H): ICD-10-CM

## 2020-11-23 DIAGNOSIS — M79.7 FIBROMYALGIA: ICD-10-CM

## 2020-11-23 LAB — INR PPP: 2.2 (ref 0.9–1.1)

## 2020-12-03 ENCOUNTER — COMMUNICATION - HEALTHEAST (OUTPATIENT)
Dept: FAMILY MEDICINE | Facility: CLINIC | Age: 70
End: 2020-12-03

## 2020-12-03 DIAGNOSIS — E11.649 TYPE 2 DIABETES MELLITUS WITH HYPOGLYCEMIA WITHOUT COMA, WITHOUT LONG-TERM CURRENT USE OF INSULIN (H): ICD-10-CM

## 2020-12-03 DIAGNOSIS — I48.0 PAROXYSMAL ATRIAL FIBRILLATION (H): ICD-10-CM

## 2020-12-03 DIAGNOSIS — D50.0 IRON DEFICIENCY ANEMIA DUE TO CHRONIC BLOOD LOSS: ICD-10-CM

## 2020-12-04 RX ORDER — SUCRALFATE 1 G/1
TABLET ORAL
Qty: 120 TABLET | Refills: 12 | Status: SHIPPED | OUTPATIENT
Start: 2020-12-04 | End: 2021-12-09

## 2020-12-16 ENCOUNTER — COMMUNICATION - HEALTHEAST (OUTPATIENT)
Dept: ANTICOAGULATION | Facility: CLINIC | Age: 70
End: 2020-12-16

## 2020-12-21 ENCOUNTER — OFFICE VISIT - HEALTHEAST (OUTPATIENT)
Dept: FAMILY MEDICINE | Facility: CLINIC | Age: 70
End: 2020-12-21

## 2020-12-21 ENCOUNTER — COMMUNICATION - HEALTHEAST (OUTPATIENT)
Dept: ANTICOAGULATION | Facility: CLINIC | Age: 70
End: 2020-12-21

## 2020-12-21 DIAGNOSIS — G89.4 CHRONIC PAIN SYNDROME: ICD-10-CM

## 2020-12-21 DIAGNOSIS — M79.7 FIBROMYALGIA: ICD-10-CM

## 2020-12-21 DIAGNOSIS — I48.91 ATRIAL FIBRILLATION (H): ICD-10-CM

## 2020-12-21 LAB — INR PPP: 2.8 (ref 0.9–1.1)

## 2020-12-30 ENCOUNTER — COMMUNICATION - HEALTHEAST (OUTPATIENT)
Dept: FAMILY MEDICINE | Facility: CLINIC | Age: 70
End: 2020-12-30

## 2021-01-02 ENCOUNTER — COMMUNICATION - HEALTHEAST (OUTPATIENT)
Dept: FAMILY MEDICINE | Facility: CLINIC | Age: 71
End: 2021-01-02

## 2021-01-02 DIAGNOSIS — M79.7 FIBROMYALGIA: ICD-10-CM

## 2021-01-02 DIAGNOSIS — K27.9 PEPTIC ULCER: ICD-10-CM

## 2021-01-08 ENCOUNTER — HOSPITAL ENCOUNTER (OUTPATIENT)
Dept: MAMMOGRAPHY | Facility: CLINIC | Age: 71
Discharge: HOME | End: 2021-01-08
Attending: FAMILY MEDICINE

## 2021-01-08 DIAGNOSIS — N64.89 BREAST ASYMMETRY: ICD-10-CM

## 2021-01-19 ENCOUNTER — COMMUNICATION - HEALTHEAST (OUTPATIENT)
Dept: CARDIOLOGY | Facility: CLINIC | Age: 71
End: 2021-01-19

## 2021-01-19 DIAGNOSIS — I35.0 NONRHEUMATIC AORTIC VALVE STENOSIS: ICD-10-CM

## 2021-01-22 ENCOUNTER — OFFICE VISIT - HEALTHEAST (OUTPATIENT)
Dept: FAMILY MEDICINE | Facility: CLINIC | Age: 71
End: 2021-01-22

## 2021-01-22 ENCOUNTER — AMBULATORY - HEALTHEAST (OUTPATIENT)
Dept: LAB | Facility: CLINIC | Age: 71
End: 2021-01-22

## 2021-01-22 ENCOUNTER — COMMUNICATION - HEALTHEAST (OUTPATIENT)
Dept: ANTICOAGULATION | Facility: CLINIC | Age: 71
End: 2021-01-22

## 2021-01-22 DIAGNOSIS — I48.91 ATRIAL FIBRILLATION (H): ICD-10-CM

## 2021-01-22 DIAGNOSIS — R42 VERTIGO: ICD-10-CM

## 2021-01-22 DIAGNOSIS — G89.4 CHRONIC PAIN SYNDROME: ICD-10-CM

## 2021-01-22 DIAGNOSIS — M79.7 FIBROMYALGIA: ICD-10-CM

## 2021-01-22 LAB — INR PPP: 2.5 (ref 0.9–1.1)

## 2021-01-22 RX ORDER — MECLIZINE HYDROCHLORIDE 25 MG/1
25 TABLET ORAL 3 TIMES DAILY PRN
Qty: 45 TABLET | Refills: 5 | Status: SHIPPED | OUTPATIENT
Start: 2021-01-22 | End: 2021-07-21

## 2021-02-12 ENCOUNTER — HOSPITAL ENCOUNTER (OUTPATIENT)
Dept: CARDIOLOGY | Facility: CLINIC | Age: 71
Discharge: HOME | End: 2021-02-12
Attending: INTERNAL MEDICINE

## 2021-02-12 DIAGNOSIS — I35.0 NONRHEUMATIC AORTIC VALVE STENOSIS: ICD-10-CM

## 2021-02-12 LAB
AORTIC ROOT: 2.9 CM
AORTIC VALVE MEAN VELOCITY: 220 CM/S
AR DECEL SLOPE: 3440 MM/S2
AR PEAK VELOCITY: 475 CM/S
AV DIMENSIONLESS INDEX VTI: 0.4
AV MEAN GRADIENT: 22 MMHG
AV PEAK GRADIENT: 44.4 MMHG
AV REGURGITANT PEAK GRADIENT: 90.3 MMHG
AV REGURGITATION PRESSURE HALF TIME: 404 MS
AV VALVE AREA: 1.3 CM2
AV VELOCITY RATIO: 0.3
DOP CALC AO PEAK VEL: 333 CM/S
DOP CALC AO VTI: 68.1 CM
DOP CALC LVOT AREA: 3.46 CM2
DOP CALC LVOT DIAMETER: 2.1 CM
DOP CALC LVOT PEAK VEL: 113 CM/S
DOP CALC LVOT STROKE VOLUME: 91.7 CM3
DOP CALCLVOT PEAK VEL VTI: 26.5 CM
EJECTION FRACTION: 60 % (ref 55–75)
FRACTIONAL SHORTENING: 52.2 % (ref 28–44)
INTERVENTRICULAR SEPTUM IN END DIASTOLE: 1.18 CM (ref 0.6–0.9)
IVS/PW RATIO: 1.1
LA AREA 1: 16.4 CM2
LA AREA 2: 17.4 CM2
LEFT ATRIUM LENGTH: 5.05 CM
LEFT ATRIUM SIZE: 3.4 CM
LEFT ATRIUM TO AORTIC ROOT RATIO: 1.17 NO UNITS
LEFT ATRIUM VOLUME: 48 ML
LEFT VENTRICLE DIASTOLIC VOLUME: 81 CM3 (ref 46–106)
LEFT VENTRICLE SYSTOLIC VOLUME: 32 CM3 (ref 14–42)
LEFT VENTRICULAR INTERNAL DIMENSION IN DIASTOLE: 3.66 CM (ref 3.8–5.2)
LEFT VENTRICULAR INTERNAL DIMENSION IN SYSTOLE: 1.75 CM (ref 2.2–3.5)
LEFT VENTRICULAR MASS: 131.6 G
LEFT VENTRICULAR OUTFLOW TRACT MEAN GRADIENT: 3 MMHG
LEFT VENTRICULAR OUTFLOW TRACT MEAN VELOCITY: 81.8 CM/S
LEFT VENTRICULAR OUTFLOW TRACT PEAK GRADIENT: 5 MMHG
LEFT VENTRICULAR POSTERIOR WALL IN END DIASTOLE: 1.07 CM (ref 0.6–0.9)
MITRAL VALVE E/A RATIO: 1.3
MV AVERAGE E/E' RATIO: 17.8 CM/S
MV DECELERATION TIME: 232 MS
MV E'TISSUE VEL-LAT: 5.75 CM/S
MV E'TISSUE VEL-MED: 6.14 CM/S
MV LATERAL E/E' RATIO: 18.4
MV MEDIAL E/E' RATIO: 17.3
MV PEAK A VELOCITY: 83.9 CM/S
MV PEAK E VELOCITY: 106 CM/S
TRICUSPID VALVE ANULAR PLANE SYSTOLIC EXCURSION: 2.2 CM

## 2021-02-18 ENCOUNTER — COMMUNICATION - HEALTHEAST (OUTPATIENT)
Dept: CARDIOLOGY | Facility: CLINIC | Age: 71
End: 2021-02-18

## 2021-02-19 ENCOUNTER — OFFICE VISIT - HEALTHEAST (OUTPATIENT)
Dept: CARDIOLOGY | Facility: CLINIC | Age: 71
End: 2021-02-19

## 2021-02-19 DIAGNOSIS — I35.0 NONRHEUMATIC AORTIC VALVE STENOSIS: ICD-10-CM

## 2021-02-19 ASSESSMENT — MIFFLIN-ST. JEOR: SCORE: 1183.34

## 2021-02-24 ENCOUNTER — OFFICE VISIT - HEALTHEAST (OUTPATIENT)
Dept: FAMILY MEDICINE | Facility: CLINIC | Age: 71
End: 2021-02-24

## 2021-02-24 ENCOUNTER — COMMUNICATION - HEALTHEAST (OUTPATIENT)
Dept: ANTICOAGULATION | Facility: CLINIC | Age: 71
End: 2021-02-24

## 2021-02-24 DIAGNOSIS — M79.7 FIBROMYALGIA: ICD-10-CM

## 2021-02-24 DIAGNOSIS — I48.91 ATRIAL FIBRILLATION (H): ICD-10-CM

## 2021-02-24 DIAGNOSIS — M54.6 TRIGGER POINT OF THORACIC REGION: ICD-10-CM

## 2021-02-24 DIAGNOSIS — G89.4 CHRONIC PAIN SYNDROME: ICD-10-CM

## 2021-02-24 LAB — INR PPP: 1.8 (ref 0.9–1.1)

## 2021-02-24 ASSESSMENT — MIFFLIN-ST. JEOR: SCORE: 1181.99

## 2021-02-24 ASSESSMENT — PATIENT HEALTH QUESTIONNAIRE - PHQ9: SUM OF ALL RESPONSES TO PHQ QUESTIONS 1-9: 2

## 2021-03-02 ENCOUNTER — COMMUNICATION - HEALTHEAST (OUTPATIENT)
Dept: CARDIOLOGY | Facility: CLINIC | Age: 71
End: 2021-03-02

## 2021-03-02 DIAGNOSIS — I48.92 ATRIAL FLUTTER, UNSPECIFIED TYPE (H): ICD-10-CM

## 2021-03-02 RX ORDER — DILTIAZEM HYDROCHLORIDE 120 MG/1
120 CAPSULE, COATED, EXTENDED RELEASE ORAL DAILY
Qty: 90 CAPSULE | Refills: 1 | Status: SHIPPED | OUTPATIENT
Start: 2021-03-02 | End: 2021-08-02

## 2021-03-09 ENCOUNTER — COMMUNICATION - HEALTHEAST (OUTPATIENT)
Dept: FAMILY MEDICINE | Facility: CLINIC | Age: 71
End: 2021-03-09

## 2021-03-09 DIAGNOSIS — E11.649 TYPE 2 DIABETES MELLITUS WITH HYPOGLYCEMIA WITHOUT COMA, WITH LONG-TERM CURRENT USE OF INSULIN (H): ICD-10-CM

## 2021-03-09 DIAGNOSIS — Z79.4 TYPE 2 DIABETES MELLITUS WITH HYPOGLYCEMIA WITHOUT COMA, WITH LONG-TERM CURRENT USE OF INSULIN (H): ICD-10-CM

## 2021-03-24 ENCOUNTER — OFFICE VISIT - HEALTHEAST (OUTPATIENT)
Dept: FAMILY MEDICINE | Facility: CLINIC | Age: 71
End: 2021-03-24

## 2021-03-24 ENCOUNTER — COMMUNICATION - HEALTHEAST (OUTPATIENT)
Dept: ANTICOAGULATION | Facility: CLINIC | Age: 71
End: 2021-03-24

## 2021-03-24 DIAGNOSIS — G89.4 CHRONIC PAIN SYNDROME: ICD-10-CM

## 2021-03-24 DIAGNOSIS — M79.7 FIBROMYALGIA: ICD-10-CM

## 2021-03-24 DIAGNOSIS — I48.91 ATRIAL FIBRILLATION (H): ICD-10-CM

## 2021-03-24 DIAGNOSIS — M54.6 TRIGGER POINT OF THORACIC REGION: ICD-10-CM

## 2021-03-24 LAB — INR PPP: 2.9 (ref 0.9–1.1)

## 2021-03-24 ASSESSMENT — MIFFLIN-ST. JEOR: SCORE: 1189.47

## 2021-03-29 ENCOUNTER — COMMUNICATION - HEALTHEAST (OUTPATIENT)
Dept: FAMILY MEDICINE | Facility: CLINIC | Age: 71
End: 2021-03-29

## 2021-03-29 DIAGNOSIS — E78.2 MIXED HYPERLIPIDEMIA: ICD-10-CM

## 2021-03-29 DIAGNOSIS — R39.9 UTI SYMPTOMS: ICD-10-CM

## 2021-03-29 RX ORDER — ATORVASTATIN CALCIUM 20 MG/1
TABLET, FILM COATED ORAL
Qty: 90 TABLET | Refills: 3 | Status: SHIPPED | OUTPATIENT
Start: 2021-03-29 | End: 2022-03-25

## 2021-04-14 ENCOUNTER — COMMUNICATION - HEALTHEAST (OUTPATIENT)
Dept: FAMILY MEDICINE | Facility: CLINIC | Age: 71
End: 2021-04-14

## 2021-04-14 DIAGNOSIS — M79.7 FIBROMYALGIA: ICD-10-CM

## 2021-04-14 RX ORDER — CYCLOBENZAPRINE HCL 10 MG
TABLET ORAL
Qty: 30 TABLET | Refills: 0 | Status: SHIPPED | OUTPATIENT
Start: 2021-04-14 | End: 2021-08-03

## 2021-04-23 ENCOUNTER — COMMUNICATION - HEALTHEAST (OUTPATIENT)
Dept: FAMILY MEDICINE | Facility: CLINIC | Age: 71
End: 2021-04-23

## 2021-04-23 DIAGNOSIS — K27.9 PEPTIC ULCER: ICD-10-CM

## 2021-04-26 ENCOUNTER — AMBULATORY - HEALTHEAST (OUTPATIENT)
Dept: LAB | Facility: CLINIC | Age: 71
End: 2021-04-26

## 2021-04-26 ENCOUNTER — OFFICE VISIT - HEALTHEAST (OUTPATIENT)
Dept: FAMILY MEDICINE | Facility: CLINIC | Age: 71
End: 2021-04-26

## 2021-04-26 ENCOUNTER — COMMUNICATION - HEALTHEAST (OUTPATIENT)
Dept: ANTICOAGULATION | Facility: CLINIC | Age: 71
End: 2021-04-26

## 2021-04-26 DIAGNOSIS — M54.6 TRIGGER POINT OF THORACIC REGION: ICD-10-CM

## 2021-04-26 DIAGNOSIS — G89.4 CHRONIC PAIN SYNDROME: ICD-10-CM

## 2021-04-26 DIAGNOSIS — M79.7 FIBROMYALGIA: ICD-10-CM

## 2021-04-26 DIAGNOSIS — F17.200 TOBACCO USE DISORDER: ICD-10-CM

## 2021-04-26 DIAGNOSIS — I48.91 ATRIAL FIBRILLATION (H): ICD-10-CM

## 2021-04-26 DIAGNOSIS — I48.91 ATRIAL FIBRILLATION, UNSPECIFIED TYPE (H): ICD-10-CM

## 2021-04-26 LAB — INR PPP: 2.5 (ref 0.9–1.1)

## 2021-04-26 RX ORDER — NICOTINE 21 MG/24HR
1 PATCH, TRANSDERMAL 24 HOURS TRANSDERMAL EVERY 24 HOURS
Qty: 30 PATCH | Refills: 1 | Status: SHIPPED | OUTPATIENT
Start: 2021-04-26 | End: 2021-08-03

## 2021-04-28 ENCOUNTER — COMMUNICATION - HEALTHEAST (OUTPATIENT)
Dept: GERIATRIC MEDICINE | Facility: CLINIC | Age: 71
End: 2021-04-28

## 2021-04-29 ENCOUNTER — COMMUNICATION - HEALTHEAST (OUTPATIENT)
Dept: FAMILY MEDICINE | Facility: CLINIC | Age: 71
End: 2021-04-29

## 2021-04-29 DIAGNOSIS — K27.9 PEPTIC ULCER: ICD-10-CM

## 2021-04-30 ENCOUNTER — COMMUNICATION - HEALTHEAST (OUTPATIENT)
Dept: FAMILY MEDICINE | Facility: CLINIC | Age: 71
End: 2021-04-30

## 2021-04-30 ENCOUNTER — AMBULATORY - HEALTHEAST (OUTPATIENT)
Dept: NURSING | Facility: CLINIC | Age: 71
End: 2021-04-30

## 2021-04-30 DIAGNOSIS — J44.0 CHRONIC OBSTRUCTIVE PULMONARY DISEASE WITH ACUTE LOWER RESPIRATORY INFECTION (H): ICD-10-CM

## 2021-04-30 RX ORDER — FAMOTIDINE 20 MG/1
20 TABLET, FILM COATED ORAL 2 TIMES DAILY
Qty: 180 TABLET | Refills: 3 | Status: SHIPPED | OUTPATIENT
Start: 2021-04-30 | End: 2021-08-03

## 2021-05-03 RX ORDER — ALBUTEROL SULFATE 90 UG/1
AEROSOL, METERED RESPIRATORY (INHALATION)
Qty: 90 G | Refills: 11 | Status: SHIPPED | OUTPATIENT
Start: 2021-05-03 | End: 2022-05-20

## 2021-05-12 ENCOUNTER — COMMUNICATION - HEALTHEAST (OUTPATIENT)
Dept: GERIATRIC MEDICINE | Facility: CLINIC | Age: 71
End: 2021-05-12

## 2021-05-21 ENCOUNTER — AMBULATORY - HEALTHEAST (OUTPATIENT)
Dept: NURSING | Facility: CLINIC | Age: 71
End: 2021-05-21

## 2021-05-23 ENCOUNTER — RECORDS - HEALTHEAST (OUTPATIENT)
Dept: FAMILY MEDICINE | Facility: CLINIC | Age: 71
End: 2021-05-23

## 2021-05-24 ENCOUNTER — COMMUNICATION - HEALTHEAST (OUTPATIENT)
Dept: ANTICOAGULATION | Facility: CLINIC | Age: 71
End: 2021-05-24

## 2021-05-24 ENCOUNTER — OFFICE VISIT - HEALTHEAST (OUTPATIENT)
Dept: FAMILY MEDICINE | Facility: CLINIC | Age: 71
End: 2021-05-24

## 2021-05-24 ENCOUNTER — RECORDS - HEALTHEAST (OUTPATIENT)
Dept: ADMINISTRATIVE | Facility: CLINIC | Age: 71
End: 2021-05-24

## 2021-05-24 DIAGNOSIS — I48.91 ATRIAL FIBRILLATION, UNSPECIFIED TYPE (H): ICD-10-CM

## 2021-05-24 DIAGNOSIS — G89.4 CHRONIC PAIN SYNDROME: ICD-10-CM

## 2021-05-24 DIAGNOSIS — I48.91 ATRIAL FIBRILLATION (H): ICD-10-CM

## 2021-05-24 DIAGNOSIS — I48.0 PAROXYSMAL ATRIAL FIBRILLATION (H): ICD-10-CM

## 2021-05-24 DIAGNOSIS — M79.7 FIBROMYALGIA: ICD-10-CM

## 2021-05-24 LAB — INR PPP: 3.3 (ref 0.9–1.1)

## 2021-05-24 RX ORDER — OXYCODONE HYDROCHLORIDE 10 MG/1
10 TABLET ORAL EVERY 6 HOURS PRN
Qty: 120 TABLET | Refills: 0 | Status: SHIPPED | OUTPATIENT
Start: 2021-05-24 | End: 2021-07-21

## 2021-05-24 RX ORDER — WARFARIN SODIUM 5 MG/1
TABLET ORAL
Qty: 90 TABLET | Refills: 1 | Status: SHIPPED | OUTPATIENT
Start: 2021-05-24 | End: 2021-11-01

## 2021-05-25 ENCOUNTER — RECORDS - HEALTHEAST (OUTPATIENT)
Dept: ADMINISTRATIVE | Facility: CLINIC | Age: 71
End: 2021-05-25

## 2021-05-26 ENCOUNTER — RECORDS - HEALTHEAST (OUTPATIENT)
Dept: ADMINISTRATIVE | Facility: CLINIC | Age: 71
End: 2021-05-26

## 2021-05-26 ASSESSMENT — PATIENT HEALTH QUESTIONNAIRE - PHQ9: SUM OF ALL RESPONSES TO PHQ QUESTIONS 1-9: 4

## 2021-05-27 ENCOUNTER — RECORDS - HEALTHEAST (OUTPATIENT)
Dept: ADMINISTRATIVE | Facility: CLINIC | Age: 71
End: 2021-05-27

## 2021-05-27 ASSESSMENT — PATIENT HEALTH QUESTIONNAIRE - PHQ9
SUM OF ALL RESPONSES TO PHQ QUESTIONS 1-9: 3
SUM OF ALL RESPONSES TO PHQ QUESTIONS 1-9: 2
SUM OF ALL RESPONSES TO PHQ QUESTIONS 1-9: 4

## 2021-05-27 NOTE — TELEPHONE ENCOUNTER
Refill Approved    Rx renewed per Medication Renewal Policy. Medication was last renewed on 5/17/18 .    Mariam Ambrocio, [facility], [place] Connection Triage/Med Refill 4/6/2019     Requested Prescriptions   Pending Prescriptions Disp Refills     atorvastatin (LIPITOR) 20 MG tablet [Pharmacy Med Name: ATORVASTATIN 20MG TABLETS] 90 tablet 0     Sig: TAKE 1 TABLET(20 MG) BY MOUTH AT BEDTIME    Statins Refill Protocol (Hmg CoA Reductase Inhibitors) Passed - 4/6/2019  3:50 AM       Passed - PCP or prescribing provider visit in past 12 months     Last office visit with prescriber/PCP: 3/15/2019 Cammy Bui MD OR same dept: 3/15/2019 Cammy Bui MD OR same specialty: 3/15/2019 Cammy Bui MD  Last physical: 10/17/2018 Last MTM visit: Visit date not found   Next visit within 3 mo: Visit date not found  Next physical within 3 mo: Visit date not found  Prescriber OR PCP: Cammy Bui MD  Last diagnosis associated with med order: 1. Mixed hyperlipidemia  - atorvastatin (LIPITOR) 20 MG tablet [Pharmacy Med Name: ATORVASTATIN 20MG TABLETS]; TAKE 1 TABLET(20 MG) BY MOUTH AT BEDTIME  Dispense: 90 tablet; Refill: 0    If protocol passes may refill for 12 months if within 3 months of last provider visit (or a total of 15 months).

## 2021-05-27 NOTE — PROGRESS NOTES
ASSESSMENT/PLAN:  1. Fibromyalgia  lidocaine 10 mg/mL (1 %) injection 10 mL   2. Trigger point of thoracic region     3. Candidal intertrigo  nystatin (NYSTOP) powder   4. Chronic pain syndrome  oxyCODONE (ROXICODONE) 10 mg immediate release tablet     This is a 70 yo female with:  1.  Fibromyalgia - has done well with trigger point injections in past - we generally do these q monthly - this minimizes the amount of opiates she uses for her chronic pain syndrome.  We reviewed use again today and have renewed her oxycodone.  2.  Candidal intertrigo - patient would benefit from Nystatin powder - this was prescribed today.  No follow-ups on file.  Administrations This Visit     lidocaine 10 mg/mL (1 %) injection 10 mL     Admin Date  04/17/2019 Action  Given Dose  10 mL Route  Other Administered By  Ana Hernández LPN                Medications Discontinued During This Encounter   Medication Reason     nystatin (NYSTOP) powder Reorder     oxyCODONE (ROXICODONE) 10 mg immediate release tablet Reorder     There are no Patient Instructions on file for this visit.    Chief Complaint:  Chief Complaint   Patient presents with     Injections     trigger point shot       HPI:   Mary Kay Tejada is a 69 y.o. female c/o  Wants to travel to [place] -   Is on oxygen at night -   Would travel 3-5 days -   Will need to arrange for oxygen there  Wouldn't be able to transport enough oxygen to go there    Pain in back, neck, shoulder:  R>L    PMH:   Patient Active Problem List    Diagnosis Date Noted     Chronic a-fib (H)      Dyslipidemia      Upper GI bleed 12/13/2018     Melena 12/13/2018     Anemia due to blood loss, acute 07/18/2018     Diabetic polyneuropathy associated with type 2 diabetes mellitus (H) 07/18/2018     Chronic anemia 06/27/2018     Cataract 11/06/2017     Bunion 07/19/2017     Callus of foot 07/19/2017     Nonrheumatic aortic valve stenosis 05/23/2017     COPD (chronic obstructive pulmonary disease) (H) 05/23/2017      DM neuropathy, type II diabetes mellitus (H) 05/19/2017     Chronic obstructive pulmonary disease with acute lower respiratory infection (H) 12/24/2016     Gastroenteritis 12/24/2016     Syncope 12/22/2016     Opacity of lung on imaging study 12/22/2016     Acute gastritis 12/22/2016     Osteoarthritis of both knees 08/22/2016     Osteoarthritis, hip, bilateral 06/20/2016     Primary osteoarthritis of left knee 06/20/2016     Candidal intertrigo 05/11/2016     Type 2 diabetes mellitus with hypoglycemia (H)      Aspiration pneumonia (H) 03/01/2016     Controlled substance agreement signed 11/23/2015     Cervical neck pain with evidence of disc disease 07/22/2015     Headache 07/17/2015     Hematoma of abdominal wall 07/05/2015     Skin lesion of face 05/22/2015     Tendonitis of wrist, right 04/22/2015     Menopause 04/06/2015     Flashers or floaters of right eye 02/23/2015     Tendonitis of wrist, left 01/21/2015     Trigger point of thoracic region 01/21/2015     Generalized osteoarthrosis, involving multiple sites 01/14/2015     Vertigo 12/17/2014     Rotator cuff tendonitis 12/17/2014     Abnormal Weight Loss      Osteoarthritis Of The Shoulder      Chronic Constipation      Onychomycosis Of The Toenails      Diarrhea      Ganglion Of The Left Wrist      Larynx Edema      Obesity      Malignant Vulvar Neoplasm      Medial Epicondylitis      Skin Lesion      Urinary Incontinence      Chronic Major Depression      Dry Eye Syndrome      Nicotine Dependence      Hypokalemia      Essential hypertension      Muscle Cramps In The Calf      Edema      Atrial flutter (H)      Lump In / On The Skin      Type 2 diabetes mellitus with hyperglycemia (H)      Chronic pain syndrome      Paroxysmal Atrial Fibrillation      Fibromyalgia      Nausea      Abdominal Pain      Peptic Ulcer      COPD exacerbation (H)      Mixed hyperlipidemia      Chronic Reflux Esophagitis      Benign Adenomatous Polyp Of The Large Intestine       Past Medical History:   Diagnosis Date     Anemia      Aortic stenosis      Atrial fibrillation (H)      Atrial flutter (H)      Benign neoplasm of adenomatous polyp     large intestine      Chronic constipation      Chronic pain syndrome      COPD (chronic obstructive pulmonary disease) (H)     Oxygen at night      Depression      Diabetes mellitus (H)      Dry eye syndrome      Fibromyalgia      Ganglion     left wrist     GERD (gastroesophageal reflux disease)      Hyperlipidemia      Hypertension      Hypokalemia      Larynx edema      Lung disease      Medial epicondylitis      Onychomycosis      Osteoarthritis      Otitis Externa     Created by Conversion      Peptic ulcer      Type 2 diabetes mellitus (H)      Vulvar malignant neoplasm (H)      Past Surgical History:   Procedure Laterality Date     BREAST BIOPSY Right      BREAST BIOPSY Right 01/28/2015     BREAST BIOPSY Right 1/28/2015    Procedure: RIGHT BREAST BIOPSY AFTER WIRE LOCALIZATION AT 0940;  Surgeon: Renée Soriano MD;  Location: [facility];  Service:      ESOPHAGOGASTRODUODENOSCOPY N/A 11/6/2018    Procedure: ESOPHAGOGASTRODUODENOSCOPY;  Surgeon: Lit Fernando MD;  Location: [facility];  Service:      HYSTERECTOMY       NY ABLATE HEART DYSRHYTHM FOCUS      Description: Catheter Ablation Atrial Fibrillation;  Recorded: 07/31/2012;  Comments: 7/24/12 PVI with Dr. Gardiner and nilay to all 5 pulm veins and CTI fl ablation line as well.     NY BIOPSY OF BREAST, INCISIONAL      Description: Incisional Breast Biopsy;  Recorded: 11/13/2007;  Comments: benign     NY SUPRACERV ABD HYSTERECTOMY      Description: Supracervical Hysterectomy;  Proc Date: 01/01/1985;  Comments: some cervix left!; ovaries intact; done for bleeding     NY TOTAL ABDOM HYSTERECTOMY      Description: Total Abdominal Hysterectomy;  Recorded: 12/31/2013;     NY TOTAL KNEE ARTHROPLASTY      Description: Total Knee Arthroplasty;  Recorded: 11/13/2007;     Social  History     Socioeconomic History     Marital status:      Spouse name: Not on file     Number of children: Not on file     Years of education: Not on file     Highest education level: Not on file   Occupational History     Not on file   Social Needs     Financial resource strain: Not on file     Food insecurity:     Worry: Not on file     Inability: Not on file     Transportation needs:     Medical: Not on file     Non-medical: Not on file   Tobacco Use     Smoking status: Light Tobacco Smoker     Types: Cigarettes     Last attempt to quit: 2014     Years since quittin.3     Smokeless tobacco: Never Used     Tobacco comment: E-cig some day   Substance and Sexual Activity     Alcohol use: Yes     Comment: very little     Drug use: No     Sexual activity: Not on file   Lifestyle     Physical activity:     Days per week: Not on file     Minutes per session: Not on file     Stress: Not on file   Relationships     Social connections:     Talks on phone: Not on file     Gets together: Not on file     Attends Jainism service: Not on file     Active member of club or organization: Not on file     Attends meetings of clubs or organizations: Not on file     Relationship status: Not on file     Intimate partner violence:     Fear of current or ex partner: Not on file     Emotionally abused: Not on file     Physically abused: Not on file     Forced sexual activity: Not on file   Other Topics Concern     Not on file   Social History Narrative     Not on file     Family History   Problem Relation Age of Onset     Heart failure Mother      Cancer Other         paternal HX-laryngeal      Alcoholism Sister      No Medical Problems Daughter      No Medical Problems Maternal Grandmother      No Medical Problems Maternal Grandfather      No Medical Problems Paternal Grandmother      No Medical Problems Paternal Grandfather      No Medical Problems Maternal Aunt      No Medical Problems Paternal Aunt      Alcoholism  "Sister      Alcoholism Brother      Alcohol abuse Father      Cancer Paternal Uncle         Gastric-Alcohol     Cancer Paternal Uncle         gastric-Alcohol     BRCA 1/2 Neg Hx      Breast cancer Neg Hx      Colon cancer Neg Hx      Endometrial cancer Neg Hx      Ovarian cancer Neg Hx        Meds:    Current Outpatient Medications:      ACCU-CHEK SOFTCLIX LANCETS lancets, TEST THREE TIMES DAILY, Disp: 300 each, Rfl: 3     albuterol (PROAIR HFA;PROVENTIL HFA;VENTOLIN HFA) 90 mcg/actuation inhaler, INHALE 2 PUFFS EVERY 4 HOURS AS NEEDED WHEEZING, Disp: 90 g, Rfl: 11     albuterol (PROVENTIL) 2.5 mg /3 mL (0.083 %) nebulizer solution, Take 3 mL (2.5 mg total) by nebulization every 4 (four) hours as needed for wheezing or shortness of breath., Disp: 75 mL, Rfl: 12     atorvastatin (LIPITOR) 20 MG tablet, TAKE 1 TABLET(20 MG) BY MOUTH AT BEDTIME, Disp: 90 tablet, Rfl: 3     BD ULTRA-FINE SHORT PEN NEEDLE 31 gauge x 5/16\" Ndle, TEST FOUR TIMES DAILY WITH MEALS AND AT BEDTIME, Disp: 300 each, Rfl: 3     blood glucose test (ONETOUCH ULTRA BLUE TEST STRIP) strips, TEST THREE TIMES DAILY, Disp: 200 strip, Rfl: 11     budesonide-formoterol (SYMBICORT) 160-4.5 mcg/actuation inhaler, Inhale 2 puffs 2 (two) times a day., Disp: 1 Inhaler, Rfl: 2     diltiazem (CARTIA XT) 120 MG 24 hr capsule, Take 1 capsule (120 mg total) by mouth daily., Disp: 90 capsule, Rfl: 1     furosemide (LASIX) 20 MG tablet, Take 20 mg by mouth daily as needed., Disp: , Rfl:      gabapentin (NEURONTIN) 300 MG capsule, Take 300 mg by mouth daily before breakfast., Disp: , Rfl:      gabapentin (NEURONTIN) 600 MG tablet, Take 1 tablet (600 mg total) by mouth 3 (three) times a day., Disp: 90 tablet, Rfl: 3     generic lancets (FINGERSTIX LANCETS), Dispense brand per patient's insurance at pharmacy discretion., Disp: 300 each, Rfl: 0     insulin aspart U-100 (NOVOLOG) 100 unit/mL injection, Inject under the skin 3 (three) times a day before meals. Inject per " "sliding scale, Disp: , Rfl:      ipratropium-albuterol (DUO-NEB) 0.5-2.5 mg/3 mL nebulizer, INAHALE 1 VIAL IN NEBULIZER EVERY 4 HOURS AS NEEDED, Disp: 1440 mL, Rfl: 0     metFORMIN (GLUCOPHAGE) 500 MG tablet, TAKE 1 TABLET(500 MG) BY MOUTH TWICE DAILY WITH MEALS, Disp: 180 tablet, Rfl: 3     multivitamin (DAILY-FAYE) per tablet, Take 1 tablet by mouth daily., Disp: 90 tablet, Rfl: 3     NOVOLOG FLEXPEN U-100 INSULIN 100 unit/mL injection pen, INJECT 10 UNITS EVERY MEAL AND AT BEDTIME, Disp: 15 mL, Rfl: 0     nystatin (NYSTOP) powder, Apply 1 application topically 2 (two) times a day as needed. 2-3 times to affected area(s)., Disp: 15 g, Rfl: 3     oxyCODONE (ROXICODONE) 10 mg immediate release tablet, Take 1 tablet (10 mg total) by mouth every 6 (six) hours as needed., Disp: 120 tablet, Rfl: 0     polyethylene glycol (MIRALAX) 17 gram packet, Take 1 packet (17 g total) by mouth daily., Disp: , Rfl: 0     ranitidine (ZANTAC) 150 MG tablet, TAKE 1 TABLET(150 MG) BY MOUTH TWICE DAILY, Disp: 180 tablet, Rfl: 3     sucralfate (CARAFATE) 1 gram tablet, TAKE 1 TABLET BY MOUTH FOUR TIMES DAILY(CRUSH TABLET AND MIX IT WITH A LITTLE WATER, THEN SWALLOW), Disp: 120 tablet, Rfl: 12     warfarin (COUMADIN/JANTOVEN) 5 MG tablet, Take 2.5 to 5mg (1/2 or 1 tabs) by mouth daily as directed.  Adjust dose based on INR. Take 2.5 mg Monday and 5 mg the rest of the week, to be started in 2 day, hold if dark stool., Disp: , Rfl:      warfarin (COUMADIN/JANTOVEN) 5 MG tablet, TAKE 1/2 TO 1 TABLET DAILY AS DIRECTED BY CLINIC, Disp: 90 tablet, Rfl: 0     meclizine (ANTIVERT) 25 mg tablet, TAKE 1 TABLET(25 MG) BY MOUTH THREE TIMES DAILY AS NEEDED FOR DIZZINESS OR NAUSEA, Disp: 45 tablet, Rfl: 5    Allergies:  Allergies   Allergen Reactions     Celebrex [Celecoxib] Rash     patient had butterfly rash - \"lupus-like\"       Latex Rash       ROS:  Pertinent positives as noted in HPI; otherwise 12 point ROS negative.      Physical Exam:  EXAM:  BP " 102/46 (Patient Site: Left Arm, Patient Position: Sitting, Cuff Size: Adult Regular)   Pulse 72   Resp 18    Gen:  NAD, appears well, well-hydrated  HEENT:  TMs nl, oropharynx benign, nasal mucosa nl, conjunctiva clear  Neck:  Supple, no adenopathy, no thyromegaly, no carotid bruits, no JVD  Lungs:  Clear to auscultation bilaterally  Cor:  RRR no murmur  Abd:  Soft, nontender, BS+, no masses, no guarding or rebound, no HSM  Back:  Tender trigger points on back - worse R>L at right paracervical and parathoracic as well as suprascapular muscles  Extr:  Neg.  Neuro:  No asymmetry  Skin:  Warm/dry    Procedure Note - Trigger Point Injections    Consent obtained: verbal    Indication:  fibromyalgia    5 trigger points identified.  Each trigger point swabbed x 3 with Betadine swab.  Each trigger point then injected with 2 ml of 1% Lidocaine (no epi).    Total volume injected:  10 ml    Complications:  None, patient tolerated procedure well.    Plan:  Discussed care with patient.  RTC for further injections in 30 days prn.        Results:  Results for orders placed or performed in visit on 03/15/19   INR   Result Value Ref Range    INR 3.20 (H) 0.90 - 1.10

## 2021-05-27 NOTE — TELEPHONE ENCOUNTER
ANTICOAGULATION  MANAGEMENT PROGRAM    Mary Kay Tejada is overdue for INR check.  Reminder call made.    Spoke with Mary Kay who declined to schedule INR at this time.  If calling back, please schedule INR check as soon as possible.    Danay Mtz RN

## 2021-05-27 NOTE — TELEPHONE ENCOUNTER
ANTICOAGULATION  MANAGEMENT    Assessment     Today's INR result of 1.6 is Subtherapeutic (goal INR of 2.0-3.0)        Warfarin taken as previously instructed    No new diet changes affecting INR    No new medication/supplements affecting INR    Continues to tolerate warfarin with no reported s/s of bleeding or thromboembolism     Previous INR was Supratherapeutic    Plan:     Spoke with Mary Kay regarding INR result and instructed:     Warfarin Dosing Instructions:  Change warfarin dose to 7.5 mg daily on Wed; and 5 mg daily rest of week  (15 % change)    Instructed patient to follow up no later than: two weeks    Education provided: importance of consistent vitamin K intake, importance of therapeutic range and importance of taking warfarin as instructed    Mary Kay verbalizes understanding and agrees to warfarin dosing plan.    Instructed to call the [facility] for any changes, questions or concerns. (#[phone])   ?   Danay Mtz RN    Subjective/Objective:      Mary Kay Tejada, a 69 y.o. female is on warfarin.     Mary Kay reports:     Home warfarin dose: as updated on anticoagulation calendar per template     Missed doses: No     Medication changes:  No     S/S of bleeding or thromboembolism:  No     New Injury or illness:  No     Changes in diet or alcohol consumption:  No     Upcoming surgery, procedure or cardioversion:  No    Anticoagulation Episode Summary     Current INR goal:   2.0-3.0   TTR:   57.5 % (2.2 y)   Next INR check:   5/1/2019   INR from last check:   1.60! (4/17/2019)   Weekly max warfarin dose:      Target end date:   Indefinite   INR check location:      Preferred lab:      Send INR reminders to:   ANTICOAGULATION POOL A (WBY,WBE,MID,RSC)    Indications    Paroxysmal Atrial Fibrillation [I48.91]           Comments:            Anticoagulation Care Providers     Provider Role Specialty Phone number    Cammy Bui MD Referring Family Medicine [phone]

## 2021-05-27 NOTE — PROGRESS NOTES
"ASSESSMENT/PLAN:  1. Fibromyalgia  lidocaine 10 mg/mL (1 %) injection 10 mL   2. Chronic pain syndrome  oxyCODONE (ROXICODONE) 10 mg immediate release tablet   3. Trigger point of thoracic region  lidocaine 10 mg/mL (1 %) injection 10 mL   4. Type 2 diabetes mellitus with hypoglycemia without coma, with long-term current use of insulin (H)  gabapentin (NEURONTIN) 600 MG tablet   5. Chronic obstructive pulmonary disease with acute lower respiratory infection (H)  DISCONTINUED: albuterol (PROAIR HFA;PROVENTIL HFA;VENTOLIN HFA) 90 mcg/actuation inhaler   6. Atrial fibrillation (H)  INR       This is a 68 yo female with:   1.  Fibromyalgia - here for trigger point injections - this has allowed us to maintain less pain medication in general - does well with regular injections  2.  Chronic Pain Syndrome - continue to use the Oxycodone - no increased need  3.  Type II DM - with neuropathy - continue Gabapentin  4.  COPD - needs refill on Albuterol inhaler - given  5.  A Fib - check INR - followed by anticoagulation team.   No Follow-up on file.  Administrations This Visit     lidocaine 10 mg/mL (1 %) injection 10 mL     Admin Date  03/15/2019 Action  Given Dose  10 mL Route  Other Administered By  Marcos Lovett CMA                Medications Discontinued During This Encounter   Medication Reason     oxyCODONE (ROXICODONE) 10 mg immediate release tablet Reorder     gabapentin (NEURONTIN) 100 MG capsule Dose adjustment     VENTOLIN HFA 90 mcg/actuation inhaler Formulary change     There are no Patient Instructions on file for this visit.    Chief Complaint:  Chief Complaint   Patient presents with     Injections     Trigger point     Medication Refill       HPI:   Mary Kay Tejada is a 69 y.o. female c/o  1.  Pain - R>L - \"really bad today\" -   2.  Numbness/tingling lower extremities - no change - using Gabapentin - seems to help  3.  COPD - needs inhaler  4.  Due for INR    PMH:   Patient Active Problem List    Diagnosis " Date Noted     Chronic a-fib (H)      Dyslipidemia      Upper GI bleed 12/13/2018     Melena 12/13/2018     Anemia due to blood loss, acute 07/18/2018     Diabetic polyneuropathy associated with type 2 diabetes mellitus (H) 07/18/2018     Chronic anemia 06/27/2018     Cataract 11/06/2017     Bunion 07/19/2017     Callus of foot 07/19/2017     Nonrheumatic aortic valve stenosis 05/23/2017     COPD (chronic obstructive pulmonary disease) (H) 05/23/2017     DM neuropathy, type II diabetes mellitus (H) 05/19/2017     Chronic obstructive pulmonary disease with acute lower respiratory infection (H) 12/24/2016     Gastroenteritis 12/24/2016     Syncope 12/22/2016     Opacity of lung on imaging study 12/22/2016     Acute gastritis 12/22/2016     Osteoarthritis of both knees 08/22/2016     Osteoarthritis, hip, bilateral 06/20/2016     Primary osteoarthritis of left knee 06/20/2016     Candidal intertrigo 05/11/2016     Type 2 diabetes mellitus with hypoglycemia (H)      Aspiration pneumonia (H) 03/01/2016     Controlled substance agreement signed 11/23/2015     Cervical neck pain with evidence of disc disease 07/22/2015     Headache 07/17/2015     Hematoma of abdominal wall 07/05/2015     Skin lesion of face 05/22/2015     Tendonitis of wrist, right 04/22/2015     Menopause 04/06/2015     Flashers or floaters of right eye 02/23/2015     Tendonitis of wrist, left 01/21/2015     Trigger point of thoracic region 01/21/2015     Generalized osteoarthrosis, involving multiple sites 01/14/2015     Vertigo 12/17/2014     Rotator cuff tendonitis 12/17/2014     Abnormal Weight Loss      Osteoarthritis Of The Shoulder      Chronic Constipation      Onychomycosis Of The Toenails      Diarrhea      Ganglion Of The Left Wrist      Larynx Edema      Obesity      Malignant Vulvar Neoplasm      Medial Epicondylitis      Skin Lesion      Urinary Incontinence      Chronic Major Depression      Dry Eye Syndrome      Nicotine Dependence       Hypokalemia      Essential hypertension      Muscle Cramps In The Calf      Edema      Atrial flutter (H)      Lump In / On The Skin      Type 2 diabetes mellitus with hyperglycemia (H)      Chronic pain syndrome      Paroxysmal Atrial Fibrillation      Fibromyalgia      Nausea      Abdominal Pain      Peptic Ulcer      COPD exacerbation (H)      Mixed hyperlipidemia      Chronic Reflux Esophagitis      Benign Adenomatous Polyp Of The Large Intestine      Past Medical History:   Diagnosis Date     Anemia      Aortic stenosis      Atrial fibrillation (H)      Atrial flutter (H)      Benign neoplasm of adenomatous polyp     large intestine      Chronic constipation      Chronic pain syndrome      COPD (chronic obstructive pulmonary disease) (H)     Oxygen at night      Depression      Diabetes mellitus (H)      Dry eye syndrome      Fibromyalgia      Ganglion     left wrist     GERD (gastroesophageal reflux disease)      Hyperlipidemia      Hypertension      Hypokalemia      Larynx edema      Lung disease      Medial epicondylitis      Onychomycosis      Osteoarthritis      Otitis Externa     Created by Conversion      Peptic ulcer      Type 2 diabetes mellitus (H)      Vulvar malignant neoplasm (H)      Past Surgical History:   Procedure Laterality Date     BREAST BIOPSY Right      BREAST BIOPSY Right 01/28/2015     BREAST BIOPSY Right 1/28/2015    Procedure: RIGHT BREAST BIOPSY AFTER WIRE LOCALIZATION AT 0940;  Surgeon: Renée Soriano MD;  Location: [facility];  Service:      ESOPHAGOGASTRODUODENOSCOPY N/A 11/6/2018    Procedure: ESOPHAGOGASTRODUODENOSCOPY;  Surgeon: Lit Fernando MD;  Location: [facility];  Service:      HYSTERECTOMY       WA ABLATE HEART DYSRHYTHM FOCUS      Description: Catheter Ablation Atrial Fibrillation;  Recorded: 07/31/2012;  Comments: 7/24/12 PVI with Dr. Gardiner and nilay to all 5 pulm veins and CTI fl ablation line as well.     WA BIOPSY OF BREAST, INCISIONAL       Description: Incisional Breast Biopsy;  Recorded: 2007;  Comments: benign     OR SUPRACERV ABD HYSTERECTOMY      Description: Supracervical Hysterectomy;  Proc Date: 1985;  Comments: some cervix left!; ovaries intact; done for bleeding     OR TOTAL ABDOM HYSTERECTOMY      Description: Total Abdominal Hysterectomy;  Recorded: 2013;     OR TOTAL KNEE ARTHROPLASTY      Description: Total Knee Arthroplasty;  Recorded: 2007;     Social History     Socioeconomic History     Marital status:      Spouse name: Not on file     Number of children: Not on file     Years of education: Not on file     Highest education level: Not on file   Occupational History     Not on file   Social Needs     Financial resource strain: Not on file     Food insecurity:     Worry: Not on file     Inability: Not on file     Transportation needs:     Medical: Not on file     Non-medical: Not on file   Tobacco Use     Smoking status: Light Tobacco Smoker     Types: Cigarettes     Last attempt to quit: 2014     Years since quittin.2     Smokeless tobacco: Never Used     Tobacco comment: E-cig some day   Substance and Sexual Activity     Alcohol use: Yes     Comment: very little     Drug use: No     Sexual activity: Not on file   Lifestyle     Physical activity:     Days per week: Not on file     Minutes per session: Not on file     Stress: Not on file   Relationships     Social connections:     Talks on phone: Not on file     Gets together: Not on file     Attends Nondenominational service: Not on file     Active member of club or organization: Not on file     Attends meetings of clubs or organizations: Not on file     Relationship status: Not on file     Intimate partner violence:     Fear of current or ex partner: Not on file     Emotionally abused: Not on file     Physically abused: Not on file     Forced sexual activity: Not on file   Other Topics Concern     Not on file   Social History Narrative     Not on file  "    Family History   Problem Relation Age of Onset     Heart failure Mother      Cancer Other         paternal HX-laryngeal      Alcoholism Sister      No Medical Problems Daughter      No Medical Problems Maternal Grandmother      No Medical Problems Maternal Grandfather      No Medical Problems Paternal Grandmother      No Medical Problems Paternal Grandfather      No Medical Problems Maternal Aunt      No Medical Problems Paternal Aunt      Alcoholism Sister      Alcoholism Brother      Alcohol abuse Father      Cancer Paternal Uncle         Gastric-Alcohol     Cancer Paternal Uncle         gastric-Alcohol     BRCA 1/2 Neg Hx      Breast cancer Neg Hx      Colon cancer Neg Hx      Endometrial cancer Neg Hx      Ovarian cancer Neg Hx        Meds:    Current Outpatient Medications:      ACCU-CHEK SOFTCLIX LANCETS lancets, TEST THREE TIMES DAILY, Disp: 300 each, Rfl: 3     albuterol (PROVENTIL) 2.5 mg /3 mL (0.083 %) nebulizer solution, Take 3 mL (2.5 mg total) by nebulization every 4 (four) hours as needed for wheezing or shortness of breath., Disp: 75 mL, Rfl: 12     atorvastatin (LIPITOR) 20 MG tablet, TAKE 1 TABLET(20 MG) BY MOUTH AT BEDTIME, Disp: 90 tablet, Rfl: 3     BD ULTRA-FINE SHORT PEN NEEDLE 31 gauge x 5/16\" Ndle, TEST FOUR TIMES DAILY WITH MEALS AND AT BEDTIME, Disp: 300 each, Rfl: 3     blood glucose test (ONETOUCH ULTRA BLUE TEST STRIP) strips, TEST THREE TIMES DAILY, Disp: 200 strip, Rfl: 11     budesonide-formoterol (SYMBICORT) 160-4.5 mcg/actuation inhaler, Inhale 2 puffs 2 (two) times a day., Disp: 1 Inhaler, Rfl: 2     furosemide (LASIX) 20 MG tablet, Take 20 mg by mouth daily as needed., Disp: , Rfl:      gabapentin (NEURONTIN) 300 MG capsule, Take 300 mg by mouth daily before breakfast., Disp: , Rfl:      generic lancets (FINGERSTIX LANCETS), Dispense brand per patient's insurance at pharmacy discretion., Disp: 300 each, Rfl: 0     insulin aspart U-100 (NOVOLOG) 100 unit/mL injection, Inject " under the skin 3 (three) times a day before meals. Inject per sliding scale, Disp: , Rfl:      ipratropium-albuterol (DUO-NEB) 0.5-2.5 mg/3 mL nebulizer, INAHALE 1 VIAL IN NEBULIZER EVERY 4 HOURS AS NEEDED, Disp: 1440 mL, Rfl: 0     meclizine (ANTIVERT) 25 mg tablet, TAKE 1 TABLET(25 MG) BY MOUTH THREE TIMES DAILY AS NEEDED FOR DIZZINESS OR NAUSEA, Disp: 30 tablet, Rfl: 5     metFORMIN (GLUCOPHAGE) 500 MG tablet, TAKE 1 TABLET(500 MG) BY MOUTH TWICE DAILY WITH MEALS, Disp: 180 tablet, Rfl: 3     multivitamin (DAILY-FAYE) per tablet, Take 1 tablet by mouth daily., Disp: 90 tablet, Rfl: 3     NOVOLOG FLEXPEN U-100 INSULIN 100 unit/mL injection pen, INJECT 10 UNITS EVERY MEAL AND AT BEDTIME, Disp: 15 mL, Rfl: 0     nystatin (NYSTOP) powder, Apply 1 application topically 2 (two) times a day as needed. 2-3 times to affected area(s)., Disp: 15 g, Rfl: 3     oxyCODONE (ROXICODONE) 10 mg immediate release tablet, Take 1 tablet (10 mg total) by mouth every 6 (six) hours as needed., Disp: 120 tablet, Rfl: 0     polyethylene glycol (MIRALAX) 17 gram packet, Take 1 packet (17 g total) by mouth daily., Disp: , Rfl: 0     ranitidine (ZANTAC) 150 MG tablet, TAKE 1 TABLET(150 MG) BY MOUTH TWICE DAILY, Disp: 180 tablet, Rfl: 3     sucralfate (CARAFATE) 1 gram tablet, TAKE 1 TABLET BY MOUTH FOUR TIMES DAILY(CRUSH TABLET AND MIX IT WITH A LITTLE WATER, THEN SWALLOW), Disp: 120 tablet, Rfl: 12     warfarin (COUMADIN/JANTOVEN) 5 MG tablet, Take 2.5 to 5mg (1/2 or 1 tabs) by mouth daily as directed.  Adjust dose based on INR. Take 2.5 mg Monday and 5 mg the rest of the week, to be started in 2 day, hold if dark stool., Disp: , Rfl:      warfarin (COUMADIN/JANTOVEN) 5 MG tablet, TAKE 1/2 TO 1 TABLET DAILY AS DIRECTED BY CLINIC, Disp: 90 tablet, Rfl: 0     albuterol (PROAIR HFA;PROVENTIL HFA;VENTOLIN HFA) 90 mcg/actuation inhaler, INHALE 2 PUFFS EVERY 4 HOURS AS NEEDED WHEEZING, Disp: 90 g, Rfl: 11     diltiazem (CARTIA XT) 120 MG 24 hr  "capsule, Take 1 capsule (120 mg total) by mouth daily., Disp: 90 capsule, Rfl: 1     gabapentin (NEURONTIN) 600 MG tablet, Take 1 tablet (600 mg total) by mouth 3 (three) times a day., Disp: 90 tablet, Rfl: 3    Allergies:  Allergies   Allergen Reactions     Celebrex [Celecoxib] Rash     patient had butterfly rash - \"lupus-like\"       Latex Rash       ROS:  Pertinent positives as noted in HPI; otherwise 12 point ROS negative.      Physical Exam:  EXAM:  /52 (Patient Site: Right Arm, Patient Position: Sitting, Cuff Size: Adult Regular)   Pulse 68   Resp 20   Wt 173 lb (78.5 kg)   Breastfeeding? No   BMI 28.79 kg/m     Gen:  NAD, appears well, well-hydrated  HEENT:  TMs nl, oropharynx benign, nasal mucosa nl, conjunctiva clear  Neck:  Supple, no adenopathy, no thyromegaly, no carotid bruits, no JVD  Lungs:  Clear to auscultation bilaterally  Cor:  irreg irreg,  no murmur  Abd:  Soft, nontender, BS+, no masses, no guarding or rebound, no HSM  Back:  Tender to palpation at trigger points upper back - mostly R>L (paracervical, suprascapular)  Extr:  Neg.  Neuro:  No asymmetry  Skin:  Warm/dry        Results:  Results for orders placed or performed in visit on 03/15/19   INR   Result Value Ref Range    INR 3.20 (H) 0.90 - 1.10       Procedure Note - Trigger Point Injections    Consent obtained: verbal    Indication:  Fibromyalgia trigger point pain    5 trigger points identified.  Each trigger point swabbed x 3 with Betadine swab.  Each trigger point then injected with 2 ml of 1% Lidocaine (no epi).    Total volume injected:  10 ml    Complications:  None, patient tolerated procedure well.    Plan:  Discussed care with patient.  RTC for further injections in 30 days prn.        "

## 2021-05-27 NOTE — TELEPHONE ENCOUNTER
Question following Office Visit  When did you see your provider: 04/17/2019  What is your question: Patient states provider was going to submit a medication to [facility] for Vertigo. Please advise.  Okay to leave a detailed message: Yes

## 2021-05-28 ENCOUNTER — COMMUNICATION - HEALTHEAST (OUTPATIENT)
Dept: GERIATRIC MEDICINE | Facility: CLINIC | Age: 71
End: 2021-05-28

## 2021-05-28 ENCOUNTER — RECORDS - HEALTHEAST (OUTPATIENT)
Dept: ADMINISTRATIVE | Facility: CLINIC | Age: 71
End: 2021-05-28

## 2021-05-28 NOTE — TELEPHONE ENCOUNTER
ANTICOAGULATION  MANAGEMENT PROGRAM    Mary Kay Tejada is overdue for INR check.  Reminder call made.    Spoke with Mary Kay and patient will have INR checked at upcoming office visit on 5/17 .    Danay Mtz RN

## 2021-05-28 NOTE — TELEPHONE ENCOUNTER
Refill Approved  Daily Vit Only    Rx renewed per Medication Renewal Policy. Medication was last renewed on 4/7/2018 for 90/3  Last OV 4/17/2019    Diamond Hines, [facility] Triage/Med Refill 5/12/2019     Requested Prescriptions   Pending Prescriptions Disp Refills     ranitidine (ZANTAC) 150 MG tablet [Pharmacy Med Name: RANITIDINE 150MG TABLETS] 180 tablet 0     Sig: TAKE 1 TABLET BY MOUTH TWICE DAILY       GI Medications Refill Protocol Passed - 5/11/2019 11:40 AM        Passed - PCP or prescribing provider visit in last 12 or next 3 months.     Last office visit with prescriber/PCP: 4/17/2019 Cammy Bui MD OR same dept: 4/17/2019 Cammy Bui MD OR same specialty: 4/17/2019 Cammy Bui MD  Last physical: 10/17/2018 Last MTM visit: Visit date not found   Next visit within 3 mo: Visit date not found  Next physical within 3 mo: Visit date not found  Prescriber OR PCP: Cammy Bui MD  Last diagnosis associated with med order: 1. GERD (gastroesophageal reflux disease)  - ranitidine (ZANTAC) 150 MG tablet [Pharmacy Med Name: RANITIDINE 150MG TABLETS]; TAKE 1 TABLET BY MOUTH TWICE DAILY  Dispense: 180 tablet; Refill: 0    2. Abnormal Weight Loss  - DAILY-FAYE tablet [Pharmacy Med Name: DAILY-FAYE TABLETS]; TAKE 1 TABLET BY MOUTH EVERY DAY  Dispense: 90 tablet; Refill: 0    If protocol passes may refill for 12 months if within 3 months of last provider visit (or a total of 15 months).             DAILY-FAYE tablet [Pharmacy Med Name: DAILY-FAYE TABLETS] 90 tablet 0     Sig: TAKE 1 TABLET BY MOUTH EVERY DAY       There is no refill protocol information for this order

## 2021-05-28 NOTE — TELEPHONE ENCOUNTER
ANTICOAGULATION  MANAGEMENT    Assessment     Today's INR result of 3.1 is Supratherapeutic (goal INR of 2.0-3.0)        Warfarin taken as previously instructed    No new diet changes affecting INR    No new medication/supplements affecting INR    Continues to tolerate warfarin with no reported s/s of bleeding or thromboembolism     Previous INR was Subtherapeutic    Plan:     Spoke with Mary Kay regarding INR result and instructed:     Warfarin Dosing Instructions:  Change warfarin dose to 5 mg daily (6.7 % change)    Instructed patient to follow up no later than: two weeks    Education provided: importance of therapeutic range and target INR goal and significance of current INR result    Mary Kay verbalizes understanding and agrees to warfarin dosing plan.    Instructed to call the [facility] for any changes, questions or concerns. (#[phone])   ?   Danay Mtz RN    Subjective/Objective:      Mary Kay Tejada, a 69 y.o. female is on warfarin.     Mary Kay reports:     Home warfarin dose: as updated on anticoagulation calendar per template     Missed doses: No     Medication changes:  No     S/S of bleeding or thromboembolism:  No     New Injury or illness:  No     Changes in diet or alcohol consumption:  No     Upcoming surgery, procedure or cardioversion:  Yes: 6/14 colonoscopy (will be scheduling pre op)    Anticoagulation Episode Summary     Current INR goal:   2.0-3.0   TTR:   57.8 % (2.3 y)   Next INR check:   5/31/2019   INR from last check:   3.10! (5/17/2019)   Weekly max warfarin dose:      Target end date:   Indefinite   INR check location:      Preferred lab:      Send INR reminders to:   ANTICOAGULATION POOL A (WBY,WBE,MID,RSC)    Indications    Paroxysmal Atrial Fibrillation [I48.91]           Comments:            Anticoagulation Care Providers     Provider Role Specialty Phone number    Cammy Bui MD Referring Family Medicine [phone]

## 2021-05-29 ENCOUNTER — RECORDS - HEALTHEAST (OUTPATIENT)
Dept: ADMINISTRATIVE | Facility: CLINIC | Age: 71
End: 2021-05-29

## 2021-05-29 NOTE — TELEPHONE ENCOUNTER
ANTICOAGULATION  MANAGEMENT    Assessment     Today's INR result of 1.2 is Subtherapeutic (goal INR of 2.0-3.0)        Warfarin recently held as instructed which may be affecting INR. Pt just resumed warfarin yesterday     Colonoscopy last week, orders to hold x 4 days prior with no bridge. Per discharge AVS she was instructed to resume warfarin 2 days post procedure so total of 6 day hold    No new diet changes affecting INR    No new medication/supplements affecting INR    Continues to tolerate warfarin with no reported s/s of bleeding or thromboembolism     Previous INR was Subtherapeutic on day of procedure, slightly high on pre-procedure INR 6/3    Plan:     Left a detailed message for Mary Kay regarding INR result and instructed:     Warfarin Dosing Instructions:  Continue current warfarin dose 2.5 mg daily on Mon; and 5 mg daily rest of week      Instructed patient to follow up no later than: Fri 6/21 or Mon 6/24    Education provided: importance of therapeutic range and target INR goal and significance of current INR result    Instructed to call the [facility] for any changes, questions or concerns. (#[phone])   ?   Marija Crawley RN    Subjective/Objective:      Mary Kay ARAMBULA Terrell, a 69 y.o. female is on warfarin.     Mary Kay reports:     Home warfarin dose: as updated on anticoagulation calendar per template     Missed doses: Held x 4 last week for colonoscopy. Per AVS she was instructed to hold off restarting x 2 days post procedure     Medication changes:  No     S/S of bleeding or thromboembolism:  No     New Injury or illness:  No     Changes in diet or alcohol consumption:  No     Upcoming surgery, procedure or cardioversion:  No    Anticoagulation Episode Summary     Current INR goal:   2.0-3.0   TTR:   56.4 % (2.4 y)   Next INR check:   6/24/2019   INR from last check:   1.20! (6/17/2019)   Weekly max warfarin dose:      Target end date:   Indefinite   INR check location:      Preferred  Pre-Operative History and Physical    Date: 4/28/2021    Admission Date: 4/28/21    Chief Complaint: painless loss of vision left eye    History of Present Illness: as above    Past Medical History:   Diagnosis Date   • A-fib (CMS/HCC) 10/2017   • Anesthesia complication     states that \"GI system shut down\" after knee replacement in 2009   • Atrial flutter (CMS/HCC) 2017    discovered with pre-op EKG (for shoulder surgery)   • Benign neoplasm of colon 03/08/2012    non-neoplastic mucosal polyp x 1   • BPH (benign prostatic hyperplasia)    • Calcaneal spur    • Cataract, left    • CKD (chronic kidney disease) stage 3, GFR 30-59 ml/min (CMS/[facility])    • Congestive cardiac failure (CMS/[facility])    • Disorders of bursae and tendons in shoulder region, unspecified 8/31/2004   • DJD KNEES    • Elevated prostate specific antigen (PSA) 2000   • GLAUCOMA SUSPECT:  LARGE C/D NL IOP 10/10/2003   • Impaired fasting glucose    • Lymphedema of both lower extremities    • Mixed hyperlipidemia     Hyperlipidemia   • Morbid obesity with BMI of 40.0-44.9, adult (CMS/[facility]) 12/28/2018   • Obesity, unspecified    • Other specified disorder resulting from impaired renal function     elevated serum creatinine   • PONV (postoperative nausea and vomiting) Occured with TKR many years ago.   • Primary pulmonary hypertension (CMS/HCC) 7/7/2017   • PTSD (post-traumatic stress disorder)     [place]   • Pulmonary hypertension (CMS/HCC) 4/13/2021    Sees transplant   • Rotator cuff (capsule) sprain 1/19/2005   • Sleep apnea     CPAP   • Urinary incontinence     has some incontinence due to lasix, wears diaper   • Uses walker    • Wears glasses        Past Surgical History:   Procedure Laterality Date   • Ablation - atrial flutter  08/28/2017    EP study, A flutter ablation   • Cardioversion  02/13/2019   • Colonoscopy remove lesions hot biopsy forceps  2-    f/u 5 yrs   • Colonoscopy remove lesions hot biopsy forceps  03/08/2012    Katarina Waite  5 yrs   • Flexible sigmoidoscopy dx  1/95    Normal   • Hernia repair  1998    umbilibal hernia - Dr Bray   • Joint replacement Right     knee   • Knee scope,diagnostic  1991    left knee surgery, Dr. Shafer   • Pacemaker implant  01/2020    Dual chamber for SND   • Right heart cath  12/11/2019   • Service to urology  1/30/2014    lasor vaporization of prostate   • Shoulder surg proc unlisted  01/06/05    Lt shoulder diagnostic arthroscopy with arthroscopic subacromial decompression & Lt shoulder open 1.5 cm supraspinatus tendon tear - [place]   • Total knee replacement  2/23/09    Right knee replacement/Dr Shafer/   • X-ray colon contrast barium enema  2/95    Normal   • X-ray colon contrast barium enema  12/2001    normal       ALLERGIES:   Allergen Reactions   • Contrast Media VOMITING   • Iodinated Diagnostic Agents Nausea & Vomiting       Current Facility-Administered Medications   Medication Dose Route Frequency Provider Last Rate Last Admin   • metoPROLOL tartrate (LOPRESSOR) tablet 25 mg  25 mg Oral Once PRN Marck Cortez MD       • dextrose (GLUTOSE) 40 % gel 30 g  30 g Oral Once PRN Marck Cortez MD       • dextrose 50 % injection 25 g  25 g Intravenous PRN Marck Cortez MD       • insulin regular (human) (HumuLIN R, NovoLIN R) sliding scale injection   Subcutaneous Once PRN Marck Cortez MD       • sodium chloride 0.9 % flush bag 25 mL  25 mL Intravenous PRN Marck Cortez MD       • sodium chloride (PF) 0.9 % injection 2 mL  2 mL Intracatheter 2 times per day Marck Cortez MD       • lactated ringers infusion   Intravenous Continuous Marck Cortez MD 10 mL/hr at 04/28/21 1405 New Bag at 04/28/21 1405   • sodium chloride 0.9% infusion   Intravenous Continuous Marck Cortez MD       • dextrose 5 % infusion   Intravenous Continuous PRN Marck Cortez MD       • sodium chloride (PF) 0.9 % injection 2 mL  2 mL Intracatheter 2 times per day Cruz Crews MD       •  lab:      Send INR reminders to:   ANTICOAGULATION POOL A (WBY,WBE,MID,RSC)    Indications    Paroxysmal Atrial Fibrillation [I48.91]           Comments:            Anticoagulation Care Providers     Provider Role Specialty Phone number    Cammy Bui MD Referring Family Medicine [phone]         sodium chloride 0.9 % flush bag 25 mL  25 mL Intravenous PRN Cruz Crews MD       • cyclopentolate (CYCLOGYL) 1 % ophthalmic solution 1 drop  1 drop Left Eye Once PRN Cruz Crews MD       • phenylephrine (MYDFRIN) 10 % ophthalmic solution 1 drop  1 drop Left Eye Once PRN Cruz Crews MD       • ondansetron (ZOFRAN) injection 4 mg  4 mg Intravenous BID PRN Marck Cortez MD       • dexamethasone (DECADRON) injection 4 mg  4 mg Intravenous Once PRN Marck Cortez MD       • naLOXone (NARCAN) injection 0.2 mg  0.2 mg Intravenous Q5 Min PRN Marck Cortez MD       • labetalol (NORMODYNE) injection 5 mg  5 mg Intravenous Q10 Min PRN Marck Cortez MD       • ePHEDrine 5 mg injection  5 mg Intravenous Q5 Min PRN Marck Cortez MD       • albuterol (VENTOLIN) nebulizer 2.5 mg  2.5 mg Nebulization Once PRN Marck Cortez MD       • sodium chloride 0.9% infusion   Intravenous Continuous Marck Cortez MD           Family History   Problem Relation Age of Onset   • Diabetes Brother    • Genetic Disorder Brother    • Genetic Disorder Sister    • Genetic Disorder Sister    • Genetic Disorder Brother         Marfan's   • COPD Brother    • Genitourinary Neg Hx        Social History     Socioeconomic History   • Marital status:      Spouse name: n/a   • Number of children: 4   • Years of education: MA Education   • Highest education level: Not on file   Occupational History   • Occupation:  retired      Employer: [employer] [id number]   Tobacco Use   • Smoking status: Former Smoker     Packs/day: 2.00     Years: 11.00     Pack years: 22.00     Types: Cigarettes     Quit date: 1966     Years since quittin.0   • Smokeless tobacco: Never Used   Substance and Sexual Activity   • Alcohol use: No     Alcohol/week: 0.0 standard drinks   • Drug use: No   • Sexual activity: Yes     Partners: Female     Comment: Monogamous   Other Topics Concern   •  Service Not  Asked   • Blood Transfusions No   • Caffeine Concern Not Asked   • Occupational Exposure Not Asked   • Hobby Hazards Not Asked   • Sleep Concern Not Asked   • Stress Concern Not Asked   • Weight Concern Not Asked   • Special Diet Not Asked   • Back Care Not Asked   • Exercise Yes   • Bike Helmet Not Asked   • Seat Belt Yes   • Self-Exams Not Asked   Social History Narrative        Does not exercise regularly     Social Determinants of Health     Financial Resource Strain:    • Social Determinants: Financial Resource Strain:    Food Insecurity:    • Social Determinants: Food Insecurity:    Transportation Needs: No Transportation Needs   • Lack of Transportation (Medical): No   • Lack of Transportation (Non-Medical): No   Physical Activity: Inactive   • Days of Exercise per Week: 0 days   • Minutes of Exercise per Session: 0 min   Stress:    • Social Determinants: Stress:    Social Connections:    • Social Determinants: Social Connections:    Intimate Partner Violence: Not At Risk   • Social Determinants: Intimate Partner Violence Past Fear: No   • Social Determinants: Intimate Partner Violence Current Fear: No       Physical Exam:   Mental Status: alert and oriented x 3  Vital Signs:  Per nursing records  left eye: Visual acuity: decreased   due to cataract  Heart / Lungs: per Anesthesia     Assessment: Cataract, left eye    Plan: Cataract extraction by phacoemulsification with intraocular lens implant, left eye   MAC standby    Informed Consent: The risks, benefits and alternatives to the treatment plan stated above including the planned anesthetic/sedation have been discussed, the patient's questions have been answered, and has agreed to proceed.    Cruz Crews MD

## 2021-05-29 NOTE — TELEPHONE ENCOUNTER
ANTICOAGULATION  MANAGEMENT: Discharge Continuity of Care Review    Outpatient surgery/procedure on  6/14 for colonoscopy.    Discharge disposition: Home    INR Results:       Recent labs: (last 7 days)     06/14/19  0906   INR 1.22*       Warfarin inpatient management: Held and resumed on 6/14    Warfarin discharge instructions: home regimen continued     Medication Changes Affecting Anticoagulation: No    Additional Factors Affecting Anticoagulation: No    Plan     Patient was instructed to have an INR done one week after resuming warfarin              Marija Crawley RN

## 2021-05-29 NOTE — TELEPHONE ENCOUNTER
Upcoming Appointment Question  When is the appointment: June 14, 2019   What is your appointment for?: Colonoscopy under sedation at [facility]  Who is your appointment scheduled with?: Dr. Eduardo Mora  What is your question/concern?: Patient is having colonoscopy.  Patient is asking if she should stop metformin or sucralfate?  If so, when?  And when to restart?  Okay to leave a detailed message?: Yes

## 2021-05-29 NOTE — ANESTHESIA CARE TRANSFER NOTE
Last vitals:   Vitals:    06/14/19 1030   BP: 112/55   Pulse: 90   Resp: 20   Temp: 36.7  C (98.1  F)   SpO2: 94%     Patient's level of consciousness is drowsy  Spontaneous respirations: yes  Maintains airway independently: yes  Dentition unchanged: yes  Oropharynx: oropharynx clear of all foreign objects    QCDR Measures:  ASA# 20 - Surgical Safety Checklist: WHO surgical safety checklist completed prior to induction    PQRS# 430 - Adult PONV Prevention: 4558F - Pt received => 2 anti-emetic agents (different classes) preop & intraop  ASA# 8 - Peds PONV Prevention: NA - Not pediatric patient, not GA or 2 or more risk factors NOT present  PQRS# 424 - Berna-op Temp Management: 4559F - At least one body temp DOCUMENTED => 35.5C or 95.9F within required timeframe  PQRS# 426 - PACU Transfer Protocol: - Transfer of care checklist used  ASA# 14 - Acute Post-op Pain: ASA14B - Patient did NOT experience pain >= 7 out of 10

## 2021-05-29 NOTE — ANESTHESIA PREPROCEDURE EVALUATION
Anesthesia Evaluation      Patient summary reviewed   History of anesthetic complications: PONV.     Airway   Mallampati: III  Neck ROM: full   Pulmonary     breath sounds clear to auscultation  (+) COPD (Home O2) severe,                          Cardiovascular   (+) hypertension, valvular problems/murmurs (Mod AS) AS, dysrhythmias (afib - coumadin), , hypercholesterolemia,     Rhythm: regular   murmur   PE comment: Distant heart sounds,     Neuro/Psych    (+) neuromuscular disease (neuropathy),  depression, anxiety/panic attacks, chronic pain (chronic opioids)    Endo/Other    (+) diabetes mellitus (A1c 5.3 6/2019) type 2 using insulin, arthritis,      Comments: Chronic anemia    GI/Hepatic/Renal    (+) GERD,       Comments: PUD  Lower GI bleed     Other findings:     NPO > 8 hrs       Results for ALVAREZ HINDS (MRN [id number]) as of 6/14/2019    6/3/2019 10:19  Sodium: 144  Potassium: 4.3  Chloride: 105  CO2: 22  Anion Gap, Calculation: 17  BUN: 15  Creatinine: 0.63  GFR MDRD Af Amer: >60  GFR MDRD Non Af Amer: >60  Calcium: 9.5  AST: 17  ALT: 17  ALBUMIN: 3.7  Protein, Total: 6.7  Cholesterol: 160  Alkaline Phosphatase: 86  Bilirubin, Total: 0.3  Triglycerides: 59  HDL Cholesterol: 69  LDL Calculated: 79  Glucose: 100  Hemoglobin A1c: 5.3    INR: 3.20 (H)    WBC: 7.0  RBC: 4.20  Hemoglobin: 11.6 (L)  Hematocrit: 35.3  MCV: 84  MCH: 27.7  MCHC: 32.9  RDW: 16.5 (H)  Platelets: 240    ECHO 8/17/18:    Summary       When compared to the previous study dated 8/31/2017, no significant change.    The estimated left ventricular ejection fraction is 65%.    Normal right ventricular size and systolic function.    Moderate aortic stenosis with trace regurgitation.          Dental    (+) upper dentures                       Anesthesia Plan  Planned anesthetic: MAC    ASA 4     Anesthetic plan and risks discussed with: patient  Anesthesia plan special considerations: antiemetics,   Post-op plan: routine recovery

## 2021-05-29 NOTE — PROGRESS NOTES
ASSESSMENT/PLAN:  1. Bilateral lower extremity edema  Compression stockings 15/20 mmHg; Knee   2. Atrial fibrillation (H)  INR   3. Chronic pain syndrome  oxyCODONE (ROXICODONE) 10 mg immediate release tablet    lidocaine 10 mg/mL (1 %) injection 10 mL   4. Fibromyalgia         This is a 70 yo female with:  1.  Bilateral lower extremity edema - encourage regular use of compression stockings - will order for patient today  2.  Atrial Fibrillation - stable symptoms - due for INR - followed by anticoagulation team;  3.  Chronic Pain syndrome with fibromyalgia and rheumatoid arthritis - takes oxycodone due to inability to tolerate NSAID therapy.  No overuse - due for monthly refill  4.  Fibromyalgia - trigger point injections done today - see procedure note  Return in about 1 month (around 6/17/2019) for trigger point injections.  Administrations This Visit     lidocaine 10 mg/mL (1 %) injection 10 mL     Admin Date  05/17/2019 Action  Given Dose  10 mL Route  Other Administered By  Marcos Lovett CMA                Medications Discontinued During This Encounter   Medication Reason     oxyCODONE (ROXICODONE) 10 mg immediate release tablet Reorder     There are no Patient Instructions on file for this visit.    Chief Complaint:  Chief Complaint   Patient presents with     Injections     Trigger point injection       HPI:   Mary Kay Tejada is a 69 y.o. female c/o  Increasing lower extremity edema - doesn't have compression stockings anymore - would wear them if she had them (she says)    No chest pain, no shortness of breath - (baseline)    Trigger point pain  - wants injections       PMH:   Patient Active Problem List    Diagnosis Date Noted     Chronic a-fib (H)      Dyslipidemia      Upper GI bleed 12/13/2018     Melena 12/13/2018     Anemia due to blood loss, acute 07/18/2018     Diabetic polyneuropathy associated with type 2 diabetes mellitus (H) 07/18/2018     Chronic anemia 06/27/2018     Cataract 11/06/2017      Bunion 07/19/2017     Callus of foot 07/19/2017     Nonrheumatic aortic valve stenosis 05/23/2017     COPD (chronic obstructive pulmonary disease) (H) 05/23/2017     DM neuropathy, type II diabetes mellitus (H) 05/19/2017     Chronic obstructive pulmonary disease with acute lower respiratory infection (H) 12/24/2016     Gastroenteritis 12/24/2016     Syncope 12/22/2016     Opacity of lung on imaging study 12/22/2016     Acute gastritis 12/22/2016     Osteoarthritis of both knees 08/22/2016     Osteoarthritis, hip, bilateral 06/20/2016     Primary osteoarthritis of left knee 06/20/2016     Candidal intertrigo 05/11/2016     Type 2 diabetes mellitus with hypoglycemia (H)      Aspiration pneumonia (H) 03/01/2016     Controlled substance agreement signed 11/23/2015     Cervical neck pain with evidence of disc disease 07/22/2015     Headache 07/17/2015     Hematoma of abdominal wall 07/05/2015     Skin lesion of face 05/22/2015     Tendonitis of wrist, right 04/22/2015     Menopause 04/06/2015     Flashers or floaters of right eye 02/23/2015     Tendonitis of wrist, left 01/21/2015     Trigger point of thoracic region 01/21/2015     Generalized osteoarthrosis, involving multiple sites 01/14/2015     Vertigo 12/17/2014     Rotator cuff tendonitis 12/17/2014     Abnormal Weight Loss      Osteoarthritis Of The Shoulder      Chronic Constipation      Onychomycosis Of The Toenails      Diarrhea      Ganglion Of The Left Wrist      Larynx Edema      Obesity      Malignant Vulvar Neoplasm      Medial Epicondylitis      Skin Lesion      Urinary Incontinence      Chronic Major Depression      Dry Eye Syndrome      Nicotine Dependence      Hypokalemia      Essential hypertension      Muscle Cramps In The Calf      Edema      Atrial flutter (H)      Lump In / On The Skin      Type 2 diabetes mellitus with hyperglycemia (H)      Chronic pain syndrome      Paroxysmal Atrial Fibrillation      Fibromyalgia      Nausea      Abdominal  Pain      Peptic Ulcer      COPD exacerbation (H)      Mixed hyperlipidemia      Chronic Reflux Esophagitis      Benign Adenomatous Polyp Of The Large Intestine      Past Medical History:   Diagnosis Date     Anemia      Aortic stenosis      Atrial fibrillation (H)      Atrial flutter (H)      Benign neoplasm of adenomatous polyp     large intestine      Chronic constipation      Chronic pain syndrome      COPD (chronic obstructive pulmonary disease) (H)     Oxygen at night      Depression      Diabetes mellitus (H)      Dry eye syndrome      Fibromyalgia      Ganglion     left wrist     GERD (gastroesophageal reflux disease)      Hyperlipidemia      Hypertension      Hypokalemia      Larynx edema      Lung disease      Medial epicondylitis      Onychomycosis      Osteoarthritis      Otitis Externa     Created by Conversion      Peptic ulcer      Type 2 diabetes mellitus (H)      Vulvar malignant neoplasm (H)      Past Surgical History:   Procedure Laterality Date     BREAST BIOPSY Right      BREAST BIOPSY Right 01/28/2015     BREAST BIOPSY Right 1/28/2015    Procedure: RIGHT BREAST BIOPSY AFTER WIRE LOCALIZATION AT 0940;  Surgeon: Renée Soriano MD;  Location: [facility];  Service:      ESOPHAGOGASTRODUODENOSCOPY N/A 11/6/2018    Procedure: ESOPHAGOGASTRODUODENOSCOPY;  Surgeon: Lit Fernando MD;  Location: [facility];  Service:      HYSTERECTOMY       OK ABLATE HEART DYSRHYTHM FOCUS      Description: Catheter Ablation Atrial Fibrillation;  Recorded: 07/31/2012;  Comments: 7/24/12 PVI with Dr. Gardiner and nilay to all 5 pulm veins and CTI fl ablation line as well.     OK BIOPSY OF BREAST, INCISIONAL      Description: Incisional Breast Biopsy;  Recorded: 11/13/2007;  Comments: benign     OK SUPRACERV ABD HYSTERECTOMY      Description: Supracervical Hysterectomy;  Proc Date: 01/01/1985;  Comments: some cervix left!; ovaries intact; done for bleeding     OK TOTAL ABDOM HYSTERECTOMY       Description: Total Abdominal Hysterectomy;  Recorded: 2013;     CA TOTAL KNEE ARTHROPLASTY      Description: Total Knee Arthroplasty;  Recorded: 2007;     Social History     Socioeconomic History     Marital status:      Spouse name: Not on file     Number of children: Not on file     Years of education: Not on file     Highest education level: Not on file   Occupational History     Not on file   Social Needs     Financial resource strain: Not on file     Food insecurity:     Worry: Not on file     Inability: Not on file     Transportation needs:     Medical: Not on file     Non-medical: Not on file   Tobacco Use     Smoking status: Light Tobacco Smoker     Types: Cigarettes     Last attempt to quit: 2014     Years since quittin.3     Smokeless tobacco: Never Used     Tobacco comment: E-cig some day   Substance and Sexual Activity     Alcohol use: Yes     Comment: very little     Drug use: No     Sexual activity: Not on file   Lifestyle     Physical activity:     Days per week: Not on file     Minutes per session: Not on file     Stress: Not on file   Relationships     Social connections:     Talks on phone: Not on file     Gets together: Not on file     Attends Mosque service: Not on file     Active member of club or organization: Not on file     Attends meetings of clubs or organizations: Not on file     Relationship status: Not on file     Intimate partner violence:     Fear of current or ex partner: Not on file     Emotionally abused: Not on file     Physically abused: Not on file     Forced sexual activity: Not on file   Other Topics Concern     Not on file   Social History Narrative     Not on file     Family History   Problem Relation Age of Onset     Heart failure Mother      Cancer Other         paternal HX-laryngeal      Alcoholism Sister      No Medical Problems Daughter      No Medical Problems Maternal Grandmother      No Medical Problems Maternal Grandfather      No  "Medical Problems Paternal Grandmother      No Medical Problems Paternal Grandfather      No Medical Problems Maternal Aunt      No Medical Problems Paternal Aunt      Alcoholism Sister      Alcoholism Brother      Alcohol abuse Father      Cancer Paternal Uncle         Gastric-Alcohol     Cancer Paternal Uncle         gastric-Alcohol     BRCA 1/2 Neg Hx      Breast cancer Neg Hx      Colon cancer Neg Hx      Endometrial cancer Neg Hx      Ovarian cancer Neg Hx        Meds:    Current Outpatient Medications:      ACCU-CHEK SOFTCLIX LANCETS lancets, TEST THREE TIMES DAILY, Disp: 300 each, Rfl: 3     albuterol (PROAIR HFA;PROVENTIL HFA;VENTOLIN HFA) 90 mcg/actuation inhaler, INHALE 2 PUFFS EVERY 4 HOURS AS NEEDED WHEEZING, Disp: 90 g, Rfl: 11     albuterol (PROVENTIL) 2.5 mg /3 mL (0.083 %) nebulizer solution, Take 3 mL (2.5 mg total) by nebulization every 4 (four) hours as needed for wheezing or shortness of breath., Disp: 75 mL, Rfl: 12     atorvastatin (LIPITOR) 20 MG tablet, TAKE 1 TABLET(20 MG) BY MOUTH AT BEDTIME, Disp: 90 tablet, Rfl: 3     BD ULTRA-FINE SHORT PEN NEEDLE 31 gauge x 5/16\" Ndle, TEST FOUR TIMES DAILY WITH MEALS AND AT BEDTIME, Disp: 300 each, Rfl: 3     blood glucose test (ONETOUCH ULTRA BLUE TEST STRIP) strips, TEST THREE TIMES DAILY, Disp: 200 strip, Rfl: 11     budesonide-formoterol (SYMBICORT) 160-4.5 mcg/actuation inhaler, Inhale 2 puffs 2 (two) times a day., Disp: 1 Inhaler, Rfl: 2     diltiazem (CARTIA XT) 120 MG 24 hr capsule, Take 1 capsule (120 mg total) by mouth daily., Disp: 90 capsule, Rfl: 1     furosemide (LASIX) 20 MG tablet, Take 20 mg by mouth daily as needed., Disp: , Rfl:      gabapentin (NEURONTIN) 300 MG capsule, Take 300 mg by mouth daily before breakfast., Disp: , Rfl:      gabapentin (NEURONTIN) 600 MG tablet, Take 1 tablet (600 mg total) by mouth 3 (three) times a day., Disp: 90 tablet, Rfl: 3     generic lancets (FINGERSTIX LANCETS), Dispense brand per patient's " insurance at pharmacy discretion., Disp: 300 each, Rfl: 0     insulin aspart U-100 (NOVOLOG) 100 unit/mL injection, Inject under the skin 3 (three) times a day before meals. Inject per sliding scale, Disp: , Rfl:      ipratropium-albuterol (DUO-NEB) 0.5-2.5 mg/3 mL nebulizer, INAHALE 1 VIAL IN NEBULIZER EVERY 4 HOURS AS NEEDED, Disp: 1440 mL, Rfl: 0     meclizine (ANTIVERT) 25 mg tablet, TAKE 1 TABLET(25 MG) BY MOUTH THREE TIMES DAILY AS NEEDED FOR DIZZINESS OR NAUSEA, Disp: 45 tablet, Rfl: 5     metFORMIN (GLUCOPHAGE) 500 MG tablet, TAKE 1 TABLET(500 MG) BY MOUTH TWICE DAILY WITH MEALS, Disp: 180 tablet, Rfl: 3     multivitamin (DAILY-FAYE) per tablet, Take 1 tablet by mouth daily., Disp: 100 tablet, Rfl: 3     NOVOLOG FLEXPEN U-100 INSULIN 100 unit/mL injection pen, INJECT 10 UNITS EVERY MEAL AND AT BEDTIME, Disp: 15 mL, Rfl: 0     nystatin (NYSTOP) powder, Apply 1 application topically 2 (two) times a day as needed. 2-3 times to affected area(s)., Disp: 15 g, Rfl: 3     oxyCODONE (ROXICODONE) 10 mg immediate release tablet, Take 1 tablet (10 mg total) by mouth every 6 (six) hours as needed., Disp: 120 tablet, Rfl: 0     polyethylene glycol (MIRALAX) 17 gram packet, Take 1 packet (17 g total) by mouth daily., Disp: , Rfl: 0     ranitidine (ZANTAC) 150 MG tablet, TAKE 1 TABLET(150 MG) BY MOUTH TWICE DAILY, Disp: 180 tablet, Rfl: 3     sucralfate (CARAFATE) 1 gram tablet, TAKE 1 TABLET BY MOUTH FOUR TIMES DAILY(CRUSH TABLET AND MIX IT WITH A LITTLE WATER, THEN SWALLOW), Disp: 120 tablet, Rfl: 12     warfarin (COUMADIN/JANTOVEN) 5 MG tablet, Take 2.5 to 5mg (1/2 or 1 tabs) by mouth daily as directed.  Adjust dose based on INR. Take 2.5 mg Monday and 5 mg the rest of the week, to be started in 2 day, hold if dark stool., Disp: , Rfl:      warfarin (COUMADIN/JANTOVEN) 5 MG tablet, TAKE 1/2 TO 1 TABLET DAILY AS DIRECTED BY CLINIC, Disp: 90 tablet, Rfl: 0    Allergies:  Allergies   Allergen Reactions     Celebrex  "[Celecoxib] Rash     patient had butterfly rash - \"lupus-like\"       Latex Rash       ROS:  Pertinent positives as noted in HPI; otherwise 12 point ROS negative.      Physical Exam:  EXAM:  /60 (Patient Site: Left Arm, Patient Position: Sitting, Cuff Size: Adult Regular)   Pulse 60   Resp 16   Wt 171 lb (77.6 kg)   Breastfeeding? No   BMI 28.46 kg/m     Gen:  NAD, appears well, well-hydrated  HEENT:  TMs nl, oropharynx benign, nasal mucosa nl, conjunctiva clear  Neck:  Supple, no adenopathy, no thyromegaly, no carotid bruits, no JVD  Lungs:  Clear to auscultation bilaterally  Cor:  irreg irreg no murmur  Abd:  Soft, nontender, BS+, no masses, no guarding or rebound, no HSM  Extr:  Trigger point tenderness right suprascapular /right paracervical/ right shoulder; bilateral lower extremity edema 1+  Neuro:  No asymmetry  Skin:  Warm/dry        Results:  Results for orders placed or performed in visit on 05/17/19   INR   Result Value Ref Range    INR 3.10 (H) 0.90 - 1.10       Procedure Note - Trigger Point Injections    Consent obtained: verbal    Indication:  Fibromyalgia - trigger point pain    5 trigger points identified.  Each trigger point swabbed x 3 with Betadine swab.  Each trigger point then injected with 2 ml of 1% Lidocaine (no epi).    Total volume injected:  10 ml    Complications:  None, patient tolerated procedure well.    Plan:  Discussed care with patient.  RTC for further injections in 30 days prn.          "

## 2021-05-29 NOTE — PROGRESS NOTES
ASSESSMENT/PLAN:  1. Fibromyalgia  lidocaine 10 mg/mL (1 %) injection 10 mL   2. Trigger point of thoracic region  lidocaine 10 mg/mL (1 %) injection 10 mL   3. Atrial fibrillation (H)  INR   4. Chronic pain syndrome  oxyCODONE (ROXICODONE) 10 mg immediate release tablet   5. Mild episode of recurrent major depressive disorder (H)         This is a 70 yo female with:  1.  Fibromyalgia/chronic pain - benefits from monthly trigger point injections.  Here for those injections today (takes chronic opiate therapy for chronic pain and this decreases her need for those).  Will continue to fill monthly Oxycodone - no increase in this.  Will continue to offer monthly trigger point injections.   2.  Atrial Fib - INR is low today; was off her warfarin for her recently colonoscopy.  Followed by anticoagulation program.    3.  Mild depression - doing well currently - admits to some lack of motivation, but otherwise stable.  PHQ-9 Total Score: 3 (6/3/2019  9:00 AM)    Return in about 1 month (around 7/17/2019) for trigger point injections.      Medications Discontinued During This Encounter   Medication Reason     oxyCODONE (ROXICODONE) 10 mg immediate release tablet Reorder     There are no Patient Instructions on file for this visit.    Chief Complaint:  Chief Complaint   Patient presents with     Injections     Trigger point injection     Medication Refill       HPI:   Mary Kay Tejada is a 69 y.o. female c/o  Pain in neck, upper back  Wants trigger point injections    PMH:   Patient Active Problem List    Diagnosis Date Noted     Chronic a-fib (H)      Dyslipidemia      Upper GI bleed 12/13/2018     Melena 12/13/2018     Anemia due to blood loss, acute 07/18/2018     Diabetic polyneuropathy associated with type 2 diabetes mellitus (H) 07/18/2018     Chronic anemia 06/27/2018     Cataract 11/06/2017     Bunion 07/19/2017     Callus of foot 07/19/2017     Nonrheumatic aortic valve stenosis 05/23/2017     COPD (chronic  obstructive pulmonary disease) (H) 05/23/2017     Chronic obstructive pulmonary disease with acute lower respiratory infection (H) 12/24/2016     Gastroenteritis 12/24/2016     Syncope 12/22/2016     Opacity of lung on imaging study 12/22/2016     Acute gastritis 12/22/2016     Osteoarthritis of both knees 08/22/2016     Osteoarthritis, hip, bilateral 06/20/2016     Primary osteoarthritis of left knee 06/20/2016     Candidal intertrigo 05/11/2016     Type 2 diabetes mellitus with hypoglycemia (H)      Aspiration pneumonia (H) 03/01/2016     Controlled substance agreement signed 11/23/2015     Cervical neck pain with evidence of disc disease 07/22/2015     Headache 07/17/2015     Hematoma of abdominal wall 07/05/2015     Skin lesion of face 05/22/2015     Tendonitis of wrist, right 04/22/2015     Menopause 04/06/2015     Flashers or floaters of right eye 02/23/2015     Tendonitis of wrist, left 01/21/2015     Trigger point of thoracic region 01/21/2015     Generalized osteoarthrosis, involving multiple sites 01/14/2015     Vertigo 12/17/2014     Rotator cuff tendonitis 12/17/2014     Abnormal Weight Loss      Osteoarthritis Of The Shoulder      Chronic Constipation      Onychomycosis Of The Toenails      Diarrhea      Ganglion Of The Left Wrist      Larynx Edema      Obesity      Medial Epicondylitis      Skin Lesion      Urinary Incontinence      Chronic Major Depression      Dry Eye Syndrome      Nicotine Dependence      Hypokalemia      Essential hypertension      Muscle Cramps In The Calf      Edema      Atrial flutter (H)      Lump In / On The Skin      Chronic pain syndrome      Paroxysmal Atrial Fibrillation      Fibromyalgia      Nausea      Abdominal Pain      Peptic Ulcer      COPD exacerbation (H)      Mixed hyperlipidemia      Chronic Reflux Esophagitis      Benign Adenomatous Polyp Of The Large Intestine      Past Medical History:   Diagnosis Date     Anemia      Aortic stenosis      Atrial fibrillation  (H)      Atrial flutter (H)      Benign neoplasm of adenomatous polyp     large intestine      Chronic constipation      Chronic pain syndrome      COPD (chronic obstructive pulmonary disease) (H)     Oxygen at night      Dependence on supplemental oxygen     Oxygen at noc, during the day as needed     Depression      Diabetes mellitus (H)      Dry eye syndrome      Fibromyalgia      Ganglion     left wrist     GERD (gastroesophageal reflux disease)      Hyperlipidemia      Hypertension      Hypokalemia      Larynx edema      Lung disease      Malignant Vulvar Neoplasm     Created by Conversion [facility] Annotation: Apr 17 2007  8:24AM - Cammy Bui:  resection per Dr. Alfonso Mane 9/06;  Replacement Utility updated for latest IMO load     Medial epicondylitis      Onychomycosis      Osteoarthritis      Otitis Externa     Created by Conversion      Peptic ulcer      Polyneuropathy      Vulvar malignant neoplasm (H)      Past Surgical History:   Procedure Laterality Date     BREAST BIOPSY Right      BREAST BIOPSY Right 01/28/2015     BREAST BIOPSY Right 1/28/2015    Procedure: RIGHT BREAST BIOPSY AFTER WIRE LOCALIZATION AT 0940;  Surgeon: Renée Soriano MD;  Location: [facility] [place];  Service:      COLONOSCOPY N/A 6/14/2019    Procedure: COLONOSCOPY;  Surgeon: Eduardo Mora MD;  Location: [facility] [place];  Service: Gastroenterology     ESOPHAGOGASTRODUODENOSCOPY N/A 11/6/2018    Procedure: ESOPHAGOGASTRODUODENOSCOPY;  Surgeon: Lit Fernando MD;  Location: [facility] [place];  Service:      HYSTERECTOMY       JOINT REPLACEMENT Left     TKA     WY ABLATE HEART DYSRHYTHM FOCUS      Description: Catheter Ablation Atrial Fibrillation;  Recorded: 07/31/2012;  Comments: 7/24/12 PVI with Dr. Gardiner and nilay to all 5 pulm veins and CTI fl ablation line as well.     W[place] BIOPSY OF BREAST, INCISIONAL      Description: Incisional Breast Biopsy;  Recorded: 11/13/2007;  Comments: benign     WY  SUPRACERV ABD HYSTERECTOMY      Description: Supracervical Hysterectomy;  Proc Date: 01/01/1985;  Comments: some cervix left!; ovaries intact; done for bleeding     Social History     Socioeconomic History     Marital status:      Spouse name: Not on file     Number of children: Not on file     Years of education: Not on file     Highest education level: Not on file   Occupational History     Not on file   Social Needs     Financial resource strain: Not on file     Food insecurity:     Worry: Not on file     Inability: Not on file     Transportation needs:     Medical: Not on file     Non-medical: Not on file   Tobacco Use     Smoking status: Current Every Day Smoker     Packs/day: 0.50     Types: Cigarettes     Smokeless tobacco: Former User   Substance and Sexual Activity     Alcohol use: Yes     Comment: very little     Drug use: No     Sexual activity: Not on file   Lifestyle     Physical activity:     Days per week: Not on file     Minutes per session: Not on file     Stress: Not on file   Relationships     Social connections:     Talks on phone: Not on file     Gets together: Not on file     Attends Latter day service: Not on file     Active member of club or organization: Not on file     Attends meetings of clubs or organizations: Not on file     Relationship status: Not on file     Intimate partner violence:     Fear of current or ex partner: Not on file     Emotionally abused: Not on file     Physically abused: Not on file     Forced sexual activity: Not on file   Other Topics Concern     Not on file   Social History Narrative     Not on file     Family History   Problem Relation Age of Onset     Heart failure Mother      Cancer Other         paternal HX-laryngeal      Alcoholism Sister      No Medical Problems Daughter      No Medical Problems Maternal Grandmother      No Medical Problems Maternal Grandfather      No Medical Problems Paternal Grandmother      No Medical Problems Paternal Grandfather  "     No Medical Problems Maternal Aunt      No Medical Problems Paternal Aunt      Alcoholism Sister      Alcoholism Brother      Alcohol abuse Father      Cancer Paternal Uncle         Gastric-Alcohol     Cancer Paternal Uncle         gastric-Alcohol     BRCA 1/2 Neg Hx      Breast cancer Neg Hx      Colon cancer Neg Hx      Endometrial cancer Neg Hx      Ovarian cancer Neg Hx        Meds:    Current Outpatient Medications:      ACCU-CHEK SOFTCLIX LANCETS lancets, TEST THREE TIMES DAILY, Disp: 300 each, Rfl: 3     albuterol (PROAIR HFA;PROVENTIL HFA;VENTOLIN HFA) 90 mcg/actuation inhaler, INHALE 2 PUFFS EVERY 4 HOURS AS NEEDED WHEEZING, Disp: 90 g, Rfl: 11     albuterol (PROVENTIL) 2.5 mg /3 mL (0.083 %) nebulizer solution, Take 3 mL (2.5 mg total) by nebulization every 4 (four) hours as needed for wheezing or shortness of breath., Disp: 75 mL, Rfl: 12     atorvastatin (LIPITOR) 20 MG tablet, TAKE 1 TABLET(20 MG) BY MOUTH AT BEDTIME, Disp: 90 tablet, Rfl: 3     BD ULTRA-FINE SHORT PEN NEEDLE 31 gauge x 5/16\" Ndle, TEST FOUR TIMES DAILY WITH MEALS AND AT BEDTIME, Disp: 300 each, Rfl: 3     blood glucose test (ONETOUCH ULTRA BLUE TEST STRIP) strips, TEST THREE TIMES DAILY, Disp: 200 strip, Rfl: 11     budesonide-formoterol (SYMBICORT) 160-4.5 mcg/actuation inhaler, Inhale 2 puffs 2 (two) times a day., Disp: 1 Inhaler, Rfl: 2     diltiazem (CARTIA XT) 120 MG 24 hr capsule, Take 1 capsule (120 mg total) by mouth daily., Disp: 90 capsule, Rfl: 1     furosemide (LASIX) 20 MG tablet, Take 1 tablet (20 mg total) by mouth daily as needed., Disp: 90 tablet, Rfl: 3     gabapentin (NEURONTIN) 300 MG capsule, Take 300 mg by mouth 2 (two) times a day. 300 mg two times a day, 600 mg at bedtime   , Disp: , Rfl:      gabapentin (NEURONTIN) 600 MG tablet, Take 1 tablet (600 mg total) by mouth 3 (three) times a day. (Patient taking differently: Take 600 mg by mouth at bedtime. 300 mg two times a day, 600 mg at bedtime   ), Disp: 90 " tablet, Rfl: 3     generic lancets (FINGERSTIX LANCETS), Dispense brand per patient's insurance at pharmacy discretion., Disp: 300 each, Rfl: 0     insulin aspart U-100 (NOVOLOG) 100 unit/mL injection, Inject under the skin 3 (three) times a day before meals. Inject per sliding scale, Disp: , Rfl:      ipratropium-albuterol (DUO-NEB) 0.5-2.5 mg/3 mL nebulizer, INAHALE 1 VIAL IN NEBULIZER EVERY 4 HOURS AS NEEDED, Disp: 1440 mL, Rfl: 0     meclizine (ANTIVERT) 25 mg tablet, TAKE 1 TABLET(25 MG) BY MOUTH THREE TIMES DAILY AS NEEDED FOR DIZZINESS OR NAUSEA, Disp: 45 tablet, Rfl: 5     metFORMIN (GLUCOPHAGE) 500 MG tablet, TAKE 1 TABLET(500 MG) BY MOUTH TWICE DAILY WITH MEALS (Patient taking differently: 500mg bid), Disp: 180 tablet, Rfl: 3     nystatin (NYSTOP) powder, Apply 1 application topically 2 (two) times a day as needed. 2-3 times to affected area(s)., Disp: 15 g, Rfl: 3     oxyCODONE (ROXICODONE) 10 mg immediate release tablet, Take 1 tablet (10 mg total) by mouth every 6 (six) hours as needed., Disp: 120 tablet, Rfl: 0     polyethylene glycol (MIRALAX) 17 gram packet, Take 1 packet (17 g total) by mouth daily. (Patient taking differently: Take 17 g by mouth daily as needed.    ), Disp: , Rfl: 0     ranitidine (ZANTAC) 150 MG tablet, TAKE 1 TABLET(150 MG) BY MOUTH TWICE DAILY, Disp: 180 tablet, Rfl: 3     sucralfate (CARAFATE) 1 gram tablet, TAKE 1 TABLET BY MOUTH FOUR TIMES DAILY(CRUSH TABLET AND MIX IT WITH A LITTLE WATER, THEN SWALLOW), Disp: 120 tablet, Rfl: 12     warfarin (COUMADIN/JANTOVEN) 5 MG tablet, Take 2.5 to 5mg (1/2 or 1 tabs) by mouth daily as directed.  Adjust dose based on INR. Take 2.5 mg Monday and 5 mg the rest of the week, to be started in 2 day, hold if dark stool., Disp: , Rfl:     Current Facility-Administered Medications:      lidocaine 10 mg/mL (1 %) injection 10 mL, 10 mL, Other, Once, Cammy Bui MD    Allergies:  Allergies   Allergen Reactions     Celebrex  "[Celecoxib] Rash     patient had butterfly rash - \"lupus-like\"       Latex Rash       ROS:  Pertinent positives as noted in HPI; otherwise 12 point ROS negative.      Physical Exam:  EXAM:  /60 (Patient Site: Left Arm, Patient Position: Sitting, Cuff Size: Adult Regular)   Pulse 80   Resp 20   Wt 168 lb 3 oz (76.3 kg)   Breastfeeding? No   BMI 27.99 kg/m     Gen:  NAD, appears well, well-hydrated  HEENT:  TMs nl, oropharynx benign, nasal mucosa nl, conjunctiva clear  Neck:  Supple, no adenopathy, no thyromegaly, no carotid bruits, no JVD  Lungs:  Clear to auscultation bilaterally  Cor:  RRR no murmur  Abd:  Soft, nontender, BS+, no masses, no guarding or rebound, no HSM  Extr:  Neg., trigger point tenderness at R>L suprascapular, paracervical, shoulder  Neuro:  No asymmetry  Skin:  Warm/dry        Results:  Results for orders placed or performed in visit on 06/17/19   INR   Result Value Ref Range    INR 1.20 (H) 0.90 - 1.10         Procedure Note - Trigger Point Injections    Consent obtained: verbal    Indication:  fibromyalgia    5 trigger points identified in right suprascapular/paracervical/shoulder.  Each trigger point swabbed x 3 with Betadine swab.  Each trigger point then injected with 2 ml of 1% Lidocaine (no epi).    Total volume injected:  10 ml    Complications:  None, patient tolerated procedure well.    Plan:  Discussed care with patient.  RTC for further injections in 30 days prn.        "

## 2021-05-29 NOTE — ANESTHESIA POSTPROCEDURE EVALUATION
Patient: Mary Kay Tejada  COLONOSCOPY  Anesthesia type: MAC    Patient location: PACU  Last vitals:   Vitals Value Taken Time   /58 6/14/2019 11:36 AM   Temp 37.3  C (99.2  F) 6/14/2019 11:30 AM   Pulse 90 6/14/2019 11:30 AM   Resp 20 6/14/2019 11:30 AM   SpO2 91 % 6/14/2019 11:30 AM   Vitals shown include unvalidated device data.  Post vital signs: stable  Level of consciousness: awake and responds to simple questions  Post-anesthesia pain: pain controlled  Post-anesthesia nausea and vomiting: no  Pulmonary: unassisted, return to baseline  Cardiovascular: stable and blood pressure at baseline  Hydration: adequate  Anesthetic events: no    QCDR Measures:  ASA# 11 - Berna-op Cardiac Arrest: ASA11B - Patient did NOT experience unanticipated cardiac arrest  ASA# 12 - Berna-op Mortality Rate: ASA12B - Patient did NOT die  ASA# 13 - PACU Re-Intubation Rate: NA - No ETT / LMA used for case  ASA# 10 - Composite Anes Safety: ASA10A - No serious adverse event    Additional Notes:

## 2021-05-29 NOTE — TELEPHONE ENCOUNTER
ANTICOAGULATION  MANAGEMENT    Assessment     Today's INR result of 3.2 is Supratherapeutic (goal INR of 2.0-3.0)        Warfarin taken as previously instructed    No new diet changes affecting INR    No new medication/supplements affecting INR    Continues to tolerate warfarin with no reported s/s of bleeding or thromboembolism     Previous INR was slightly Supratherapeutic, warfarin dose was reduced yet INR is essentially unchanged    Colonoscopy 6/14/19, per orders below patient to hold x 4 days prior with no bridge    Plan:     Spoke with Mary Kay regarding INR result and instructed:     Warfarin Dosing Instructions:  Change warfarin dose to 2.5 mg daily on Mon; and 5 mg daily rest of week  (7 % change). Last dose of warfarin to be taken on Sun 6/9. Hold warfarin 6/10-6/13. If okayed by MD doing the procedure resume warfarin on Fri 6/14 at usual dose.     Instructed patient to follow up no later than: 1 week after resuming warfarin    Education provided: importance of therapeutic range and target INR goal and significance of current INR result    Mary Kay verbalizes understanding and agrees to warfarin dosing plan.    Instructed to call the [facility] for any changes, questions or concerns. (#[phone])   ?   Marija Crawley RN    Subjective/Objective:      Mary Kaydilma Tejada, a 69 y.o. female is on warfarin.     Mary Kay reports:     Home warfarin dose: as updated on anticoagulation calendar per template     Missed doses: No     Medication changes:  No     S/S of bleeding or thromboembolism:  No     New Injury or illness:  No     Changes in diet or alcohol consumption:  No     Upcoming surgery, procedure or cardioversion:  Yes: colonoscopy Fri 6/14    Anticoagulation Episode Summary     Current INR goal:   2.0-3.0   TTR:   56.7 % (2.4 y)   Next INR check:   6/21/2019   INR from last check:   3.20! (6/3/2019)   Weekly max warfarin dose:      Target end date:   Indefinite   INR check location:       Preferred lab:      Send INR reminders to:   ANTICOAGULATION POOL A ([facility])    Indications    Paroxysmal Atrial Fibrillation [I48.91]           Comments:            Anticoagulation Care Providers     Provider Role Specialty Phone number    Cammy Bui MD Referring Family Medicine [phone]

## 2021-05-29 NOTE — PROGRESS NOTES
Preoperative Exam    Scheduled Procedure: Colonoscopy under anesthesia  Surgery Date:  6/14/19  Surgery Location: [facility], fax [phone]    Surgeon:  Dr. Eduardo Mora    Assessment/Plan:     1. Preop examination     2. Anemia due to blood loss, acute  HM1(CBC and Differential)    HM1 (CBC with Diff)   3. Benign neoplasm of colon, unspecified part of colon     4. Constipation, unspecified constipation type     5. Peptic ulcer     6. Pulmonary emphysema, unspecified emphysema type (H)     7. Essential hypertension  Comprehensive Metabolic Panel   8. Fibromyalgia     9. Diabetic polyneuropathy associated with type 2 diabetes mellitus (H)  Glycosylated Hemoglobin A1c    Comprehensive Metabolic Panel   10. Atrial flutter, unspecified type (H)     11. Atrial fibrillation (H)  INR   12. Localized edema  furosemide (LASIX) 20 MG tablet   13. Mixed hyperlipidemia  Lipid Profile    Comprehensive Metabolic Panel     This is a 68 yo female here for preop exam - scheduled for colonoscopy at [facility].  Has had severe chronic anemia - thought related to lower GI bleeding.    1.  Anemia/ h/o adenomatous polyp/ constipation / h/o peptic ulcer:  Has had anemia - needs colonoscopy - this has been scheduled by GI for 6/11/19.  Will check labs.   2.  COPD - generally stable - uses oxygen at night and sometimes during day (with shortness of breath/ due to humidity) - uses inhalers as well.  3.  Hypertension - blood pressure is well controlled  4.  Fibromyalgia - does monthly trigger point injections for pain control; uses limited supply opiates for pain (due to inability to use NSAIDs due to previous peptic ulcer disease)  5.  Type II DM - needs further evaluation - has been fairly stable, controlled; takes sliding scale insulin - hasn't used for quite a while; takes Metformin in morning and evening - will hold Metformin for 1 day prior to procedure due to decreased intake  6.  Atrial flutter - on chronic  coumadin therapy - follows INR with anticoagulation team - will hold      Surgical Procedure Risk: Intermediate (reported cardiac risk generally 1-5%)  Have you had prior anesthesia?: Yes  Have you or any family members had a previous anesthesia reaction:  No  Do you or any family members have a history of a clotting or bleeding disorder?: is on chronic coumadin (warfarin) therapy for paroxysmal atrial flutter  Cardiac Risk Assessment: increased risk for major cardiac complications based on  has paroxysmal atrial flutter    Patient approved for surgery with general or local anesthesia.  Please note: Will hold Metformin day before surgery; patient does use oxygen frequently - monitor respiratory status closely    Please Note:  Patient is taking medications for Chronic Pain.  uses inhalers/oxygen for COPD    Functional Status: Independent  Patient plans to recover at home with family.     Subjective:      Mary Kay Tejada is a 69 y.o. female who presents for a preoperative consultation.      Patient with chronic underlying medical conditions - here for preop conditions -   Has h/o chronic anemia - thought to be related to GI blood losses - scheduled for colonoscopy    All other systems reviewed and are negative, other than those listed in the HPI.    Pertinent History  Do you have difficulty breathing or chest pain after walking up a flight of stairs: Yes: due to COPD  History of obstructive sleep apnea: has never been tested ;   Steroid use in the last 6 months: Yes: has used oral prednisone for COPD exacerbation  Frequent Aspirin/NSAID use: No  Prior Blood Transfusion: No  Prior Blood Transfusion Reaction: no previous transfusion  If for some reason prior to, during or after the procedure, if it is medically indicated, would you be willing to have a blood transfusion?:  There is no transfusion refusal.    Current Outpatient Medications   Medication Sig Dispense Refill     albuterol (PROAIR HFA;PROVENTIL  HFA;VENTOLIN HFA) 90 mcg/actuation inhaler INHALE 2 PUFFS EVERY 4 HOURS AS NEEDED WHEEZING 90 g 11     albuterol (PROVENTIL) 2.5 mg /3 mL (0.083 %) nebulizer solution Take 3 mL (2.5 mg total) by nebulization every 4 (four) hours as needed for wheezing or shortness of breath. 75 mL 12     atorvastatin (LIPITOR) 20 MG tablet TAKE 1 TABLET(20 MG) BY MOUTH AT BEDTIME 90 tablet 3     blood glucose test (ONETOUCH ULTRA BLUE TEST STRIP) strips TEST THREE TIMES DAILY 200 strip 11     budesonide-formoterol (SYMBICORT) 160-4.5 mcg/actuation inhaler Inhale 2 puffs 2 (two) times a day. 1 Inhaler 2     diltiazem (CARTIA XT) 120 MG 24 hr capsule Take 1 capsule (120 mg total) by mouth daily. 90 capsule 1     furosemide (LASIX) 20 MG tablet Take 1 tablet (20 mg total) by mouth daily as needed. 90 tablet 3     gabapentin (NEURONTIN) 300 MG capsule Take 300 mg by mouth daily before breakfast.       gabapentin (NEURONTIN) 600 MG tablet Take 1 tablet (600 mg total) by mouth 3 (three) times a day. 90 tablet 3     generic lancets (FINGERSTIX LANCETS) Dispense brand per patient's insurance at pharmacy discretion. 300 each 0     ipratropium-albuterol (DUO-NEB) 0.5-2.5 mg/3 mL nebulizer INAHALE 1 VIAL IN NEBULIZER EVERY 4 HOURS AS NEEDED 1440 mL 0     meclizine (ANTIVERT) 25 mg tablet TAKE 1 TABLET(25 MG) BY MOUTH THREE TIMES DAILY AS NEEDED FOR DIZZINESS OR NAUSEA 45 tablet 5     metFORMIN (GLUCOPHAGE) 500 MG tablet TAKE 1 TABLET(500 MG) BY MOUTH TWICE DAILY WITH MEALS 180 tablet 3     multivitamin (DAILY-FAYE) per tablet Take 1 tablet by mouth daily. 100 tablet 3     nystatin (NYSTOP) powder Apply 1 application topically 2 (two) times a day as needed. 2-3 times to affected area(s). 15 g 3     oxyCODONE (ROXICODONE) 10 mg immediate release tablet Take 1 tablet (10 mg total) by mouth every 6 (six) hours as needed. 120 tablet 0     polyethylene glycol (MIRALAX) 17 gram packet Take 1 packet (17 g total) by mouth daily.  0     ranitidine  "(ZANTAC) 150 MG tablet TAKE 1 TABLET(150 MG) BY MOUTH TWICE DAILY 180 tablet 3     sucralfate (CARAFATE) 1 gram tablet TAKE 1 TABLET BY MOUTH FOUR TIMES DAILY(CRUSH TABLET AND MIX IT WITH A LITTLE WATER, THEN SWALLOW) 120 tablet 12     warfarin (COUMADIN/JANTOVEN) 5 MG tablet Take 2.5 to 5mg (1/2 or 1 tabs) by mouth daily as directed.  Adjust dose based on INR.  Take 2.5 mg Monday and 5 mg the rest of the week, to be started in 2 day, hold if dark stool.       warfarin (COUMADIN/JANTOVEN) 5 MG tablet TAKE 1/2 TO 1 TABLET DAILY AS DIRECTED BY CLINIC 90 tablet 0     ACCU-CHEK SOFTCLIX LANCETS lancets TEST THREE TIMES DAILY 300 each 3     BD ULTRA-FINE SHORT PEN NEEDLE 31 gauge x 5/16\" Ndle TEST FOUR TIMES DAILY WITH MEALS AND AT BEDTIME 300 each 3     insulin aspart U-100 (NOVOLOG) 100 unit/mL injection Inject under the skin 3 (three) times a day before meals. Inject per sliding scale       NOVOLOG FLEXPEN U-100 INSULIN 100 unit/mL injection pen INJECT 10 UNITS EVERY MEAL AND AT BEDTIME 15 mL 0     No current facility-administered medications for this visit.         Allergies   Allergen Reactions     Celebrex [Celecoxib] Rash     patient had butterfly rash - \"lupus-like\"       Latex Rash       Patient Active Problem List   Diagnosis     Abnormal Weight Loss     Osteoarthritis Of The Shoulder     Chronic Constipation     Onychomycosis Of The Toenails     Diarrhea     Ganglion Of The Left Wrist     Larynx Edema     Obesity     Malignant Vulvar Neoplasm     Medial Epicondylitis     Skin Lesion     Urinary Incontinence     Chronic Major Depression     Dry Eye Syndrome     Nicotine Dependence     Hypokalemia     Essential hypertension     Muscle Cramps In The Calf     Edema     Atrial flutter (H)     Lump In / On The Skin     Chronic pain syndrome     Paroxysmal Atrial Fibrillation     Fibromyalgia     Nausea     Abdominal Pain     Peptic Ulcer     COPD exacerbation (H)     Mixed hyperlipidemia     Chronic Reflux " Esophagitis     Benign Adenomatous Polyp Of The Large Intestine     Vertigo     Rotator cuff tendonitis     Generalized osteoarthrosis, involving multiple sites     Tendonitis of wrist, left     Trigger point of thoracic region     Flashers or floaters of right eye     Menopause     Tendonitis of wrist, right     Skin lesion of face     Hematoma of abdominal wall     Headache     Cervical neck pain with evidence of disc disease     Controlled substance agreement signed     Aspiration pneumonia (H)     Type 2 diabetes mellitus with hypoglycemia (H)     Candidal intertrigo     Osteoarthritis, hip, bilateral     Primary osteoarthritis of left knee     Osteoarthritis of both knees     Syncope     Opacity of lung on imaging study     Acute gastritis     Chronic obstructive pulmonary disease with acute lower respiratory infection (H)     Gastroenteritis     Nonrheumatic aortic valve stenosis     COPD (chronic obstructive pulmonary disease) (H)     Bunion     Callus of foot     Cataract     Chronic anemia     Anemia due to blood loss, acute     Diabetic polyneuropathy associated with type 2 diabetes mellitus (H)     Upper GI bleed     Melena     Chronic a-fib (H)     Dyslipidemia       Past Medical History:   Diagnosis Date     Anemia      Aortic stenosis      Atrial fibrillation (H)      Atrial flutter (H)      Benign neoplasm of adenomatous polyp     large intestine      Chronic constipation      Chronic pain syndrome      COPD (chronic obstructive pulmonary disease) (H)     Oxygen at night      Depression      Diabetes mellitus (H)      Dry eye syndrome      Fibromyalgia      Ganglion     left wrist     GERD (gastroesophageal reflux disease)      Hyperlipidemia      Hypertension      Hypokalemia      Larynx edema      Lung disease      Medial epicondylitis      Onychomycosis      Osteoarthritis      Otitis Externa     Created by Conversion      Peptic ulcer      Vulvar malignant neoplasm (H)        Past Surgical  History:   Procedure Laterality Date     BREAST BIOPSY Right      BREAST BIOPSY Right 2015     BREAST BIOPSY Right 2015    Procedure: RIGHT BREAST BIOPSY AFTER WIRE LOCALIZATION AT 0940;  Surgeon: Renée Soriano MD;  Location: [facility];  Service:      ESOPHAGOGASTRODUODENOSCOPY N/A 2018    Procedure: ESOPHAGOGASTRODUODENOSCOPY;  Surgeon: Lit Fernando MD;  Location: [facility];  Service:      HYSTERECTOMY       IL ABLATE HEART DYSRHYTHM FOCUS      Description: Catheter Ablation Atrial Fibrillation;  Recorded: 2012;  Comments: 12 PVI with Dr. Gardiner and nilay to all 5 pulm veins and CTI fl ablation line as well.     IL BIOPSY OF BREAST, INCISIONAL      Description: Incisional Breast Biopsy;  Recorded: 2007;  Comments: benign     IL SUPRACERV ABD HYSTERECTOMY      Description: Supracervical Hysterectomy;  Proc Date: 1985;  Comments: some cervix left!; ovaries intact; done for bleeding     IL TOTAL ABDOM HYSTERECTOMY      Description: Total Abdominal Hysterectomy;  Recorded: 2013;     IL TOTAL KNEE ARTHROPLASTY      Description: Total Knee Arthroplasty;  Recorded: 2007;       Social History     Socioeconomic History     Marital status:      Spouse name: Not on file     Number of children: Not on file     Years of education: Not on file     Highest education level: Not on file   Occupational History     Not on file   Social Needs     Financial resource strain: Not on file     Food insecurity:     Worry: Not on file     Inability: Not on file     Transportation needs:     Medical: Not on file     Non-medical: Not on file   Tobacco Use     Smoking status: Light Tobacco Smoker     Types: Cigarettes     Last attempt to quit: 2014     Years since quittin.4     Smokeless tobacco: Never Used     Tobacco comment: E-cig some day   Substance and Sexual Activity     Alcohol use: Yes     Comment: very little     Drug use: No     Sexual  activity: Not on file   Lifestyle     Physical activity:     Days per week: Not on file     Minutes per session: Not on file     Stress: Not on file   Relationships     Social connections:     Talks on phone: Not on file     Gets together: Not on file     Attends Protestant service: Not on file     Active member of club or organization: Not on file     Attends meetings of clubs or organizations: Not on file     Relationship status: Not on file     Intimate partner violence:     Fear of current or ex partner: Not on file     Emotionally abused: Not on file     Physically abused: Not on file     Forced sexual activity: Not on file   Other Topics Concern     Not on file   Social History Narrative     Not on file       Patient Care Team:  Cammy Bui MD as PCP - General  Samantha Alexandra RN as Lead Care Coordinator (Primary Care - CC)          Objective:     Vitals:    06/03/19 0909   BP: 114/76   Pulse: 74   Resp: 16   Weight: 172 lb (78 kg)         Physical Exam:  Physical Exam   EXAM:  /76 (Patient Site: Right Arm, Patient Position: Sitting, Cuff Size: Adult Regular)   Pulse 74   Resp 16   Wt 172 lb (78 kg)   BMI 28.62 kg/m     Gen:  NAD, appears well, well-hydrated  HEENT:  TMs nl, oropharynx benign, nasal mucosa nl, conjunctiva clear  Neck:  Supple, no adenopathy, no thyromegaly, no carotid bruits, no JVD  Lungs:  Clear to auscultation bilaterally, decreased BS throughout  Cor:  RRR.  + systolic murmur  Abd:  Soft, nontender, BS+, no masses, no guarding or rebound, no HSM  Extr:  DJD - knees, hips, hands, shoulders  Neuro:  No asymmetry, Nl motor tone/strength, nl sensation, reflexes =, gait nl, nl coordination, CN intact,   Skin:  Warm/dry, cyst on right mid posterior back - drained of thick sebaceous debris today (not infected)        There are no Patient Instructions on file for this visit.        Labs:    Xr Chest 2 Views    Result Date: 6/3/2019  EXAM: XR CHEST 2 VIEWS LOCATION: [facility] DATE/TIME: 6/3/2019 10:06 AM INDICATION: Preoperative exam. COMPARISON: 03/03/2016 FINDINGS: Thoracolumbar scoliosis. New bilateral lung linear opacities likely represent atelectasis, scar, or mild fissural thickening. Hyperinflation suggests obstructive pulmonary disease. Heart size appears normal. No effusion, pulmonary edema, or pneumonia seen.      Recent Results (from the past 24 hour(s))   Electrocardiogram Perform - Clinic    Collection Time: 06/03/19  9:56 AM   Result Value Ref Range    SYSTOLIC BLOOD PRESSURE  mmHg    DIASTOLIC BLOOD PRESSURE  mmHg    VENTRICULAR RATE 73 BPM    ATRIAL RATE 73 BPM    P-R INTERVAL 176 ms    QRS DURATION 98 ms    Q-T INTERVAL 406 ms    QTC CALCULATION (BEZET) 447 ms    P Axis 86 degrees    R AXIS -69 degrees    T AXIS 72 degrees    MUSE DIAGNOSIS       Normal sinus rhythm  Left anterior fascicular block  Abnormal ECG  When compared with ECG of 13-DEC-2018 10:55,  No significant change was found  Confirmed by CHILO GREEN MD LOC:[facility] ([id number]) on 6/3/2019 3:57:43 PM     INR    Collection Time: 06/03/19 10:19 AM   Result Value Ref Range    INR 3.20 (H) 0.90 - 1.10   Glycosylated Hemoglobin A1c    Collection Time: 06/03/19 10:19 AM   Result Value Ref Range    Hemoglobin A1c 5.3 3.5 - 6.0 %   Lipid Profile    Collection Time: 06/03/19 10:19 AM   Result Value Ref Range    Triglycerides 59 <=149 mg/dL    Cholesterol 160 <=199 mg/dL    LDL Calculated 79 <=129 mg/dL    HDL Cholesterol 69 >=50 mg/dL    Patient Fasting > 8hrs? Yes    Comprehensive Metabolic Panel    Collection Time: 06/03/19 10:19 AM   Result Value Ref Range    Sodium 144 136 - 145 mmol/L    Potassium 4.3 3.5 - 5.0 mmol/L    Chloride 105 98 - 107 mmol/L    CO2 22 22 - 31 mmol/L    Anion Gap, Calculation 17 5 - 18 mmol/L    Glucose 100 70 - 125 mg/dL    BUN 15 8 - 22 mg/dL    Creatinine 0.63 0.60 - 1.10 mg/dL    GFR MDRD Af Amer >60 >60 mL/min/1.73m2    GFR MDRD Non Af Amer >60 >60 mL/min/1.73m2     Bilirubin, Total 0.3 0.0 - 1.0 mg/dL    Calcium 9.5 8.5 - 10.5 mg/dL    Protein, Total 6.7 6.0 - 8.0 g/dL    Albumin 3.7 3.5 - 5.0 g/dL    Alkaline Phosphatase 86 45 - 120 U/L    AST 17 0 - 40 U/L    ALT 17 0 - 45 U/L   HM1 (CBC with Diff)    Collection Time: 06/03/19 10:19 AM   Result Value Ref Range    WBC 7.0 4.0 - 11.0 thou/uL    RBC 4.20 3.80 - 5.40 mill/uL    Hemoglobin 11.6 (L) 12.0 - 16.0 g/dL    Hematocrit 35.3 35.0 - 47.0 %    MCV 84 80 - 100 fL    MCH 27.7 27.0 - 34.0 pg    MCHC 32.9 32.0 - 36.0 g/dL    RDW 16.5 (H) 11.0 - 14.5 %    Platelets 240 140 - 440 thou/uL    MPV 8.2 7.0 - 10.0 fL    Neutrophils % 62 50 - 70 %    Lymphocytes % 27 20 - 40 %    Monocytes % 8 2 - 10 %    Eosinophils % 3 0 - 6 %    Basophils % 0 0 - 2 %    Neutrophils Absolute 4.3 2.0 - 7.7 thou/uL    Lymphocytes Absolute 1.8 0.8 - 4.4 thou/uL    Monocytes Absolute 0.6 0.0 - 0.9 thou/uL    Eosinophils Absolute 0.2 0.0 - 0.4 thou/uL    Basophils Absolute 0.0 0.0 - 0.2 thou/uL       Immunization History   Administered Date(s) Administered     DT (pediatric) 03/01/2004     Hep A, historic 03/11/2008, 08/03/2010     Influenza high dose, seasonal 09/21/2015, 10/19/2016, 10/20/2017, 10/17/2018     Influenza, inj, historic,unspecified 09/26/2007, 10/22/2008     Influenza, seasonal,quad inj 6-35 mos 09/25/2009, 09/10/2010, 10/05/2011, 09/14/2012, 09/14/2012, 09/20/2013, 09/19/2014     Pneumo Conj 13-V (2010&after) 07/22/2015     Pneumo Polysac 23-V 06/22/2010, 10/19/2016     Td,adult,historic,unspecified 03/11/2008     Tdap 03/11/2008     ZOSTER, LIVE 08/25/2012, 07/22/2015     ZOSTER, RECOMBINANT, IM 10/17/2018           Electronically signed by Cammy Bui MD 06/03/19 9:21 AM

## 2021-05-29 NOTE — TELEPHONE ENCOUNTER
Patient Returning Call  Reason for call: Returning phone call.  Information relayed to patient:  Below message relayed to patient.  Patient has additional questions:  No  If YES, what are your questions/concerns:  No additional questions at this time.  Okay to leave a detailed message?: No call back needed

## 2021-05-30 ENCOUNTER — RECORDS - HEALTHEAST (OUTPATIENT)
Dept: ADMINISTRATIVE | Facility: CLINIC | Age: 71
End: 2021-05-30

## 2021-05-30 VITALS — WEIGHT: 204.8 LBS | BODY MASS INDEX: 33.56 KG/M2

## 2021-05-30 VITALS — BODY MASS INDEX: 36.05 KG/M2 | WEIGHT: 220 LBS

## 2021-05-30 VITALS — BODY MASS INDEX: 34.17 KG/M2 | WEIGHT: 208.5 LBS

## 2021-05-30 NOTE — TELEPHONE ENCOUNTER
"Refill Approved    Rx renewed per Medication Renewal Policy. Medication was last renewed on 5/29/18.    Daksha Siu, [facility] Triage/Med Refill 7/24/2019     Requested Prescriptions   Pending Prescriptions Disp Refills     BD ULTRA-FINE SHORT PEN NEEDLE 31 gauge x 5/16\" Ndle [Pharmacy Med Name: B-D PEN NDL SHRT [id number](5/16) LIBIA] 300 each 0     Sig: TEST FOUR TIMES DAILY WITH MEALS AND AT BEDTIME       Diabetic Supplies Refill Protocol Passed - 7/24/2019  3:49 AM        Passed - Visit with PCP or prescribing provider visit in last 6 months     Last office visit with prescriber/PCP: 7/17/2019 Cammy Bui MD OR same dept: 7/17/2019 Cammy Bui MD OR same specialty: 7/17/2019 Cammy Bui MD  Last physical: 6/3/2019 Last MTM visit: Visit date not found   Next visit within 3 mo: Visit date not found  Next physical within 3 mo: Visit date not found  Prescriber OR PCP: Cammy Bui MD  Last diagnosis associated with med order: 1. DM (diabetes mellitus) (H)  - BD ULTRA-FINE SHORT PEN NEEDLE 31 gauge x 5/16\" Ndle [Pharmacy Med Name: B-D PEN NDL SHRT [id number](5/16) LIBIA]; TEST FOUR TIMES DAILY WITH MEALS AND AT BEDTIME  Dispense: 300 each; Refill: 0    If protocol passes may refill for 12 months if within 3 months of last provider visit (or a total of 15 months).             Passed - A1C in last 6 months     Hemoglobin A1c   Date Value Ref Range Status   06/03/2019 5.3 3.5 - 6.0 % Final                           "

## 2021-05-30 NOTE — PROGRESS NOTES
"ASSESSMENT/PLAN:  1. Type 2 diabetes mellitus with hypoglycemia without coma, without long-term current use of insulin (H)  blood glucose test strips    CANCELED: Microalbumin, Random Urine   2. Chronic pain syndrome  oxyCODONE (ROXICODONE) 10 mg immediate release tablet   3. Skin lesion     4. Atrial fibrillation (H)  INR    INR   5. Fibromyalgia  lidocaine 1%-EPINEPHrine 1:100,000 1 %-1:100,000 injection 1 mL (XYLOCAINE W/EPI)       This is a 68 yo female with multiple underlying concerns:  1.  Type II DM - discussed blood sugar monitoring - needs test strips (can't check sugars because she has a mismatch between meter/strips)  2.  Chronic pain syndrome - asked for urine specimen for Oxycodone refill - she \"can't void\" - knows she needs to provide specimen next visit  3.  Skin lesion - non healing wound on lower extremity - referred to dermatology - patient doesn't want to go - she has \"other\" things to do before she goes there  4.  Atrial Fibrillation - on chronic anticoagulation - needs INR today  5.  Trigger Point injection for fibromyalgia/trigger point - will inject with Lidocaine - see procedure note  Return in about 1 month (around 8/17/2019) for Recheck chronic pain.      Medications Discontinued During This Encounter   Medication Reason     blood glucose test (ONETOUCH ULTRA BLUE TEST STRIP) strips      oxyCODONE (ROXICODONE) 10 mg immediate release tablet Reorder     There are no Patient Instructions on file for this visit.    Chief Complaint:  Chief Complaint   Patient presents with     Injections     trigger finger injection       HPI:   Mary Kay Tejada is a 69 y.o. female c/o  Has OneTouch VerioIQ meter but OneTouch Ultra Test Strips - just got #300, but they don't work in her machine  Needs compatibile strips    Chronic pain - needs refill on meds today - doesn't want to give urine - had just used bathroom before I entered the room - know that urine tox screen is a condition of ongoing " prescriptions for pain    Skin lesion on left lower extremity - not healing - has tried lotions/potions at home        PMH:   Patient Active Problem List    Diagnosis Date Noted     Skin lesion 07/17/2019     Chronic a-fib (H)      Dyslipidemia      Upper GI bleed 12/13/2018     Melena 12/13/2018     Anemia due to blood loss, acute 07/18/2018     Diabetic polyneuropathy associated with type 2 diabetes mellitus (H) 07/18/2018     Chronic anemia 06/27/2018     Cataract 11/06/2017     Bunion 07/19/2017     Callus of foot 07/19/2017     Nonrheumatic aortic valve stenosis 05/23/2017     COPD (chronic obstructive pulmonary disease) (H) 05/23/2017     Chronic obstructive pulmonary disease with acute lower respiratory infection (H) 12/24/2016     Gastroenteritis 12/24/2016     Syncope 12/22/2016     Opacity of lung on imaging study 12/22/2016     Acute gastritis 12/22/2016     Osteoarthritis of both knees 08/22/2016     Osteoarthritis, hip, bilateral 06/20/2016     Primary osteoarthritis of left knee 06/20/2016     Candidal intertrigo 05/11/2016     Type 2 diabetes mellitus with hypoglycemia (H)      Aspiration pneumonia (H) 03/01/2016     Controlled substance agreement signed 11/23/2015     Cervical neck pain with evidence of disc disease 07/22/2015     Headache 07/17/2015     Hematoma of abdominal wall 07/05/2015     Skin lesion of face 05/22/2015     Tendonitis of wrist, right 04/22/2015     Menopause 04/06/2015     Flashers or floaters of right eye 02/23/2015     Tendonitis of wrist, left 01/21/2015     Trigger point of thoracic region 01/21/2015     Generalized osteoarthrosis, involving multiple sites 01/14/2015     Vertigo 12/17/2014     Rotator cuff tendonitis 12/17/2014     Abnormal Weight Loss      Osteoarthritis Of The Shoulder      Chronic Constipation      Onychomycosis Of The Toenails      Diarrhea      Ganglion Of The Left Wrist      Larynx Edema      Obesity      Medial Epicondylitis      Skin Lesion       Urinary Incontinence      Chronic Major Depression      Dry Eye Syndrome      Nicotine Dependence      Hypokalemia      Essential hypertension      Muscle Cramps In The Calf      Edema      Atrial flutter (H)      Lump In / On The Skin      Chronic pain syndrome      Paroxysmal Atrial Fibrillation      Fibromyalgia      Nausea      Abdominal Pain      Peptic Ulcer      COPD exacerbation (H)      Mixed hyperlipidemia      Chronic Reflux Esophagitis      Benign Adenomatous Polyp Of The Large Intestine      Past Medical History:   Diagnosis Date     Anemia      Aortic stenosis      Atrial fibrillation (H)      Atrial flutter (H)      Benign neoplasm of adenomatous polyp     large intestine      Chronic constipation      Chronic pain syndrome      COPD (chronic obstructive pulmonary disease) (H)     Oxygen at night      Dependence on supplemental oxygen     Oxygen at noc, during the day as needed     Depression      Diabetes mellitus (H)      Dry eye syndrome      Fibromyalgia      Ganglion     left wrist     GERD (gastroesophageal reflux disease)      Hyperlipidemia      Hypertension      Hypokalemia      Larynx edema      Lung disease      Malignant Vulvar Neoplasm     Created by Conversion [facility] Annotation: Apr 17 2007  8:24AM - Cammy Bui:  resection per Dr. Alfonso Mane 9/06;  Replacement Utility updated for latest IMO load     Medial epicondylitis      Onychomycosis      Osteoarthritis      Otitis Externa     Created by Conversion      Peptic ulcer      Polyneuropathy      Vulvar malignant neoplasm (H)      Past Surgical History:   Procedure Laterality Date     BREAST BIOPSY Right      BREAST BIOPSY Right 01/28/2015     BREAST BIOPSY Right 1/28/2015    Procedure: RIGHT BREAST BIOPSY AFTER WIRE LOCALIZATION AT 0940;  Surgeon: Renée Soriano MD;  Location: [facility];  Service:      COLONOSCOPY N/A 6/14/2019    Procedure: COLONOSCOPY;  Surgeon: Eduardo Mora MD;  Location: [facility] Main OR;  Service: Gastroenterology     ESOPHAGOGASTRODUODENOSCOPY N/A 11/6/2018    Procedure: ESOPHAGOGASTRODUODENOSCOPY;  Surgeon: Lit Fernando MD;  Location: [facility] Main OR;  Service:      HYSTERECTOMY       JOINT REPLACEMENT Left     TKA     NM ABLATE HEART DYSRHYTHM FOCUS      Description: Catheter Ablation Atrial Fibrillation;  Recorded: 07/31/2012;  Comments: 7/24/12 PVI with Dr. Gardiner and nilay to all 5 pulm veins and CTI fl ablation line as well.     NM BIOPSY OF BREAST, INCISIONAL      Description: Incisional Breast Biopsy;  Recorded: 11/13/2007;  Comments: benign     NM SUPRACERV ABD HYSTERECTOMY      Description: Supracervical Hysterectomy;  Proc Date: 01/01/1985;  Comments: some cervix left!; ovaries intact; done for bleeding     Social History     Socioeconomic History     Marital status:      Spouse name: Not on file     Number of children: Not on file     Years of education: Not on file     Highest education level: Not on file   Occupational History     Not on file   Social Needs     Financial resource strain: Not on file     Food insecurity:     Worry: Not on file     Inability: Not on file     Transportation needs:     Medical: Not on file     Non-medical: Not on file   Tobacco Use     Smoking status: Current Every Day Smoker     Packs/day: 0.50     Types: Cigarettes     Smokeless tobacco: Former User   Substance and Sexual Activity     Alcohol use: Yes     Comment: very little     Drug use: No     Sexual activity: Not on file   Lifestyle     Physical activity:     Days per week: Not on file     Minutes per session: Not on file     Stress: Not on file   Relationships     Social connections:     Talks on phone: Not on file     Gets together: Not on file     Attends Hinduism service: Not on file     Active member of club or organization: Not on file     Attends meetings of clubs or organizations: Not on file     Relationship status: Not on file     Intimate partner  violence:     Fear of current or ex partner: Not on file     Emotionally abused: Not on file     Physically abused: Not on file     Forced sexual activity: Not on file   Other Topics Concern     Not on file   Social History Narrative     Not on file     Family History   Problem Relation Age of Onset     Heart failure Mother      Cancer Other         paternal HX-laryngeal      Alcoholism Sister      No Medical Problems Daughter      No Medical Problems Maternal Grandmother      No Medical Problems Maternal Grandfather      No Medical Problems Paternal Grandmother      No Medical Problems Paternal Grandfather      No Medical Problems Maternal Aunt      No Medical Problems Paternal Aunt      Alcoholism Sister      Alcoholism Brother      Alcohol abuse Father      Cancer Paternal Uncle         Gastric-Alcohol     Cancer Paternal Uncle         gastric-Alcohol     BRCA 1/2 Neg Hx      Breast cancer Neg Hx      Colon cancer Neg Hx      Endometrial cancer Neg Hx      Ovarian cancer Neg Hx        Meds:    Current Outpatient Medications:      ACCU-CHEK SOFTCLIX LANCETS lancets, TEST THREE TIMES DAILY, Disp: 300 each, Rfl: 3     albuterol (PROAIR HFA;PROVENTIL HFA;VENTOLIN HFA) 90 mcg/actuation inhaler, INHALE 2 PUFFS EVERY 4 HOURS AS NEEDED WHEEZING, Disp: 90 g, Rfl: 11     albuterol (PROVENTIL) 2.5 mg /3 mL (0.083 %) nebulizer solution, Take 3 mL (2.5 mg total) by nebulization every 4 (four) hours as needed for wheezing or shortness of breath., Disp: 75 mL, Rfl: 12     atorvastatin (LIPITOR) 20 MG tablet, TAKE 1 TABLET(20 MG) BY MOUTH AT BEDTIME, Disp: 90 tablet, Rfl: 3     blood-glucose meter (ONETOUCH VERIO IQ METER) Misc, Check blood sugar three times a day., Disp: 1 each, Rfl: 0     budesonide-formoterol (SYMBICORT) 160-4.5 mcg/actuation inhaler, Inhale 2 puffs 2 (two) times a day., Disp: 1 Inhaler, Rfl: 2     diltiazem (CARTIA XT) 120 MG 24 hr capsule, Take 1 capsule (120 mg total) by mouth daily., Disp: 90 capsule,  Rfl: 1     furosemide (LASIX) 20 MG tablet, Take 1 tablet (20 mg total) by mouth daily as needed., Disp: 90 tablet, Rfl: 3     gabapentin (NEURONTIN) 300 MG capsule, Take 300 mg by mouth 2 (two) times a day. 300 mg two times a day, 600 mg at bedtime   , Disp: , Rfl:      gabapentin (NEURONTIN) 600 MG tablet, Take 1 tablet (600 mg total) by mouth 3 (three) times a day. (Patient taking differently: Take 600 mg by mouth at bedtime. 300 mg two times a day, 600 mg at bedtime   ), Disp: 90 tablet, Rfl: 3     generic lancets (FINGERSTIX LANCETS), Dispense brand per patient's insurance at pharmacy discretion., Disp: 300 each, Rfl: 0     insulin aspart U-100 (NOVOLOG) 100 unit/mL injection, Inject under the skin 3 (three) times a day before meals. Inject per sliding scale, Disp: , Rfl:      ipratropium-albuterol (DUO-NEB) 0.5-2.5 mg/3 mL nebulizer, INAHALE 1 VIAL IN NEBULIZER EVERY 4 HOURS AS NEEDED, Disp: 1440 mL, Rfl: 0     meclizine (ANTIVERT) 25 mg tablet, TAKE 1 TABLET(25 MG) BY MOUTH THREE TIMES DAILY AS NEEDED FOR DIZZINESS OR NAUSEA, Disp: 45 tablet, Rfl: 5     metFORMIN (GLUCOPHAGE) 500 MG tablet, TAKE 1 TABLET(500 MG) BY MOUTH TWICE DAILY WITH MEALS (Patient taking differently: 500mg bid), Disp: 180 tablet, Rfl: 3     nystatin (NYSTOP) powder, Apply 1 application topically 2 (two) times a day as needed. 2-3 times to affected area(s)., Disp: 15 g, Rfl: 3     oxyCODONE (ROXICODONE) 10 mg immediate release tablet, Take 1 tablet (10 mg total) by mouth every 6 (six) hours as needed., Disp: 120 tablet, Rfl: 0     polyethylene glycol (MIRALAX) 17 gram packet, Take 1 packet (17 g total) by mouth daily. (Patient taking differently: Take 17 g by mouth daily as needed.    ), Disp: , Rfl: 0     ranitidine (ZANTAC) 150 MG tablet, TAKE 1 TABLET(150 MG) BY MOUTH TWICE DAILY, Disp: 180 tablet, Rfl: 3     sucralfate (CARAFATE) 1 gram tablet, TAKE 1 TABLET BY MOUTH FOUR TIMES DAILY(CRUSH TABLET AND MIX IT WITH A LITTLE WATER, THEN  "SWALLOW), Disp: 120 tablet, Rfl: 12     warfarin (COUMADIN/JANTOVEN) 5 MG tablet, Take 2.5 to 5mg (1/2 or 1 tabs) by mouth daily as directed.  Adjust dose based on INR. Take 2.5 mg Monday and 5 mg the rest of the week, to be started in 2 day, hold if dark stool., Disp: , Rfl:      BD ULTRA-FINE SHORT PEN NEEDLE 31 gauge x 5/16\" Ndle, TEST FOUR TIMES DAILY WITH MEALS AND AT BEDTIME, Disp: 400 each, Rfl: 3     blood glucose test strips, Use 1 each As Directed 3 (three) times a day as needed (for blood glucose testing)., Disp: 100 strip, Rfl: 11    Current Facility-Administered Medications:      lidocaine 1%-EPINEPHrine 1:100,000 1 %-1:100,000 injection 1 mL (XYLOCAINE W/EPI), 1 mL, Other, Once, Cammy Bui MD    Allergies:  Allergies   Allergen Reactions     Celebrex [Celecoxib] Rash     patient had butterfly rash - \"lupus-like\"       Latex Rash       ROS:  Pertinent positives as noted in HPI; otherwise 12 point ROS negative.      Physical Exam:  EXAM:  /60 (Patient Site: Right Arm, Patient Position: Sitting, Cuff Size: Adult Regular)   Pulse 81   Temp 98.7  F (37.1  C) (Oral)   Resp 16   Ht 5' 5\" (1.651 m)   SpO2 96%   Breastfeeding? No   BMI 27.99 kg/m     Gen:  NAD, appears well, well-hydrated  HEENT:  TMs nl, oropharynx benign, nasal mucosa nl, conjunctiva clear  Neck:  Supple, no adenopathy, no thyromegaly, no carotid bruits, no JVD  Lungs:  Clear to auscultation bilaterally  Cor:  RRR no murmur  Abd:  Soft, nontender, BS+, no masses, no guarding or rebound, no HSM  Extr:  Neg.,  Neuro:  No asymmetry  Skin:  Warm/dry, left lower extremity has non-healing 7 mm diameter lesion        Results:  Results for orders placed or performed in visit on 07/17/19   INR   Result Value Ref Range    INR 2.60 (H) 0.90 - 1.10         Procedure Note - Trigger Point Injections    Consent obtained: verbal    Indication:  Fibromyalgia trigger point pain    5 trigger points identified.  Each trigger point " swabbed x 3 with Betadine swab.  Each trigger point then injected with 2 ml of 1% Lidocaine (no epi).    Total volume injected:  10 ml    Complications:  None, patient tolerated procedure well.    Plan:  Discussed care with patient.  RTC for further injections in 30 days prn.

## 2021-05-30 NOTE — TELEPHONE ENCOUNTER
Meter Set up for approval. Called the pharmacy patient just needs a new meter. She still has refills for lancets, and strips.

## 2021-05-30 NOTE — TELEPHONE ENCOUNTER
Medication Request  Medication name:   1.New Diabetic Glucose meter ( 1 touch ultra )   2.Supplies   Pharmacy Name and Location: [facility] ( listed)   Reason for request: Patient needs new Meter and supplies   When did you use medication last?:  07/05/19  Patient offered appointment:  patient declined, Patient will see PCP next week .   Okay to leave a detailed message: yes  [phone]

## 2021-05-30 NOTE — TELEPHONE ENCOUNTER
Referral Request  Type of referral: Dermatology  Who s requesting: Patient  Why the request: Patient has skin tag on leg, spoke to Dr. Brizuela at appointment on 7/17 but forgot to ask for a referral.   Have you been seen for this request: Yes, patient was seen last year with [facility] for same issue   Does patient have a preference on a group/provider? [facility] - Dr. Guy Corrales to leave a detailed message?  Yes, please call patient to advise

## 2021-05-30 NOTE — PROGRESS NOTES
[facility] Care Coordination Contact      [facility] Six-Month Telephone Assessment    6 month telephone assessment completed on 6/13/19.    ER visits: No  Hospitalizations: No  TCU stays: No  Significant health status changes: No  Falls/Injuries: No  ADL/IADL changes: No  Changes in services: No    Caregiver Assessment follow up:  n/a    Goals: See POC in chart for goal progress documentation.      Will see member in 6 months for an annual health risk assessment.   Encouraged member to call CC with any questions or concerns in the meantime.     Samantha Alexandra RN, PHN   [facility]  [phone]

## 2021-05-30 NOTE — TELEPHONE ENCOUNTER
ANTICOAGULATION  MANAGEMENT    Assessment     Today's INR result of 2.6 is Therapeutic (goal INR of 2.0-3.0)        Warfarin taken as previously instructed    No new diet changes affecting INR    No new medication/supplements affecting INR    Continues to tolerate warfarin with no reported s/s of bleeding or thromboembolism     Previous INR was Subtherapeutic    Plan:     Spoke with Mary Kay regarding INR result and instructed:     Warfarin Dosing Instructions:  Continue current warfarin dose 2.5 mg daily on Mondays; and 5 mg daily rest of week  (0 % change)    Instructed patient to follow up no later than: one month.    Education provided: importance of therapeutic range, importance of following up for INR monitoring at instructed interval and importance of taking warfarin as instructed    Mary Kay verbalizes understanding and agrees to warfarin dosing plan.    Instructed to call the [facility] for any changes, questions or concerns. (#[phone])   ?   Mena Dumont RN    Subjective/Objective:      Mary Kaydilma Tejada, a 69 y.o. female is on warfarin.     Mary Kay reports:     Home warfarin dose: verbally confirmed home dose with Mary Kay and updated on anticoagulation calendar     Missed doses: No     Medication changes:  No     S/S of bleeding or thromboembolism:  No     New Injury or illness:  No     Changes in diet or alcohol consumption:  No     Upcoming surgery, procedure or cardioversion:  No    Anticoagulation Episode Summary     Current INR goal:   2.0-3.0   TTR:   56.0 % (2.5 y)   Next INR check:   8/14/2019   INR from last check:   2.60 (7/17/2019)   Weekly max warfarin dose:      Target end date:   Indefinite   INR check location:      Preferred lab:      Send INR reminders to:   [facility]    Indications    Paroxysmal Atrial Fibrillation [I48.91]           Comments:            Anticoagulation Care Providers     Provider Role Specialty Phone number    Cammy Bui  MD [facility] [phone]

## 2021-05-31 VITALS — WEIGHT: 204 LBS | BODY MASS INDEX: 32.78 KG/M2 | HEIGHT: 66 IN

## 2021-05-31 VITALS — WEIGHT: 199 LBS | BODY MASS INDEX: 33.63 KG/M2

## 2021-05-31 VITALS — HEIGHT: 64 IN | BODY MASS INDEX: 33.97 KG/M2 | WEIGHT: 199 LBS

## 2021-05-31 VITALS — BODY MASS INDEX: 33.49 KG/M2 | HEIGHT: 65 IN | WEIGHT: 201 LBS

## 2021-05-31 VITALS — WEIGHT: 200.9 LBS | BODY MASS INDEX: 33.95 KG/M2

## 2021-05-31 VITALS — WEIGHT: 202 LBS | BODY MASS INDEX: 34.14 KG/M2

## 2021-05-31 VITALS — HEIGHT: 65 IN | BODY MASS INDEX: 33.99 KG/M2 | WEIGHT: 204 LBS

## 2021-05-31 VITALS — BODY MASS INDEX: 33.64 KG/M2 | WEIGHT: 196 LBS

## 2021-05-31 VITALS — WEIGHT: 201 LBS | HEIGHT: 65 IN | BODY MASS INDEX: 33.49 KG/M2

## 2021-05-31 NOTE — PROGRESS NOTES
"ASSESSMENT/PLAN:  1. Long term current use of anticoagulant therapy  INR   2. Mixed stress and urge urinary incontinence  diaper,brief,adult,disposable (ADULT BRIEFS - LARGE) Misc   3. Chronic pain syndrome  Drugs of Abuse 1,Urine    oxyCODONE (ROXICODONE) 10 mg immediate release tablet   4. Need for tetanus booster  Td, Preservative Free (green label)   5. Need for shingles vaccine  Varicella Zoster, Recombinant Vaccine IM   6. Type 2 diabetes mellitus with hypoglycemia without coma, without long-term current use of insulin (H)  Microalbumin, Random Urine       This is a 68 yo female with:    1.  Anticoagulant use - needs INR today- done  2.  Chronic pain syndrome/fibromyalgia - comes in for trigger point injections - done today - see procedure note;  Patient does use chronic opiates - is due for urine tox screening (discussed) - will get urine today  3.  Stress/urge incontinence - needs adult briefs  Return in about 1 month (around 9/16/2019).   4.  Type II DM - due for microalbumin screening - urine obtained today  5.  Health Maintenance - due for tetanus, shingles vaccine - ordered today    Medications Discontinued During This Encounter   Medication Reason     oxyCODONE (ROXICODONE) 10 mg immediate release tablet Reorder     There are no Patient Instructions on file for this visit.    Chief Complaint:  Chief Complaint   Patient presents with     Injections     shoulder trigger point     Medication Refill     pain, loratidine is not on medication list       HPI:   Mary Kay Tejada is a 69 y.o. female c/o  1.  Pain syndrome - desires trigger point injections; wants renewal of opiate; due for urine tox screen  2.  Diabetes - \"stable\"  3.  Urinary incontinence - uses adult briefs - needs refill      PMH:   Patient Active Problem List    Diagnosis Date Noted     Mixed stress and urge urinary incontinence 08/16/2019     Skin lesion 07/17/2019     Chronic a-fib (H)      Dyslipidemia      Upper GI bleed 12/13/2018     " Melena 12/13/2018     Anemia due to blood loss, acute 07/18/2018     Diabetic polyneuropathy associated with type 2 diabetes mellitus (H) 07/18/2018     Chronic anemia 06/27/2018     Cataract 11/06/2017     Bunion 07/19/2017     Callus of foot 07/19/2017     Nonrheumatic aortic valve stenosis 05/23/2017     COPD (chronic obstructive pulmonary disease) (H) 05/23/2017     Chronic obstructive pulmonary disease with acute lower respiratory infection (H) 12/24/2016     Gastroenteritis 12/24/2016     Syncope 12/22/2016     Opacity of lung on imaging study 12/22/2016     Acute gastritis 12/22/2016     Osteoarthritis of both knees 08/22/2016     Osteoarthritis, hip, bilateral 06/20/2016     Primary osteoarthritis of left knee 06/20/2016     Candidal intertrigo 05/11/2016     Type 2 diabetes mellitus with hypoglycemia (H)      Aspiration pneumonia (H) 03/01/2016     Controlled substance agreement signed 11/23/2015     Cervical neck pain with evidence of disc disease 07/22/2015     Headache 07/17/2015     Hematoma of abdominal wall 07/05/2015     Skin lesion of face 05/22/2015     Tendonitis of wrist, right 04/22/2015     Menopause 04/06/2015     Flashers or floaters of right eye 02/23/2015     Tendonitis of wrist, left 01/21/2015     Trigger point of thoracic region 01/21/2015     Generalized osteoarthrosis, involving multiple sites 01/14/2015     Vertigo 12/17/2014     Rotator cuff tendonitis 12/17/2014     Abnormal Weight Loss      Osteoarthritis Of The Shoulder      Chronic Constipation      Onychomycosis Of The Toenails      Diarrhea      Ganglion Of The Left Wrist      Larynx Edema      Obesity      Medial Epicondylitis      Skin Lesion      Urinary Incontinence      Chronic Major Depression      Dry Eye Syndrome      Nicotine Dependence      Hypokalemia      Essential hypertension      Muscle Cramps In The Calf      Edema      Atrial flutter (H)      Lump In / On The Skin      Chronic pain syndrome      Paroxysmal  Atrial Fibrillation      Fibromyalgia      Nausea      Abdominal Pain      Peptic Ulcer      COPD exacerbation (H)      Mixed hyperlipidemia      Chronic Reflux Esophagitis      Benign Adenomatous Polyp Of The Large Intestine      Past Medical History:   Diagnosis Date     Anemia      Aortic stenosis      Atrial fibrillation (H)      Atrial flutter (H)      Benign neoplasm of adenomatous polyp     large intestine      Chronic constipation      Chronic pain syndrome      COPD (chronic obstructive pulmonary disease) (H)     Oxygen at night      Dependence on supplemental oxygen     Oxygen at noc, during the day as needed     Depression      Diabetes mellitus (H)      Dry eye syndrome      Fibromyalgia      Ganglion     left wrist     GERD (gastroesophageal reflux disease)      Hyperlipidemia      Hypertension      Hypokalemia      Larynx edema      Lung disease      Malignant Vulvar Neoplasm     Created by Conversion [facility] Annotation: Apr 17 2007  8:24AM - Cammy Bui:  resection per Dr. Alfonso Mane 9/06;  Replacement Utility updated for latest IMO load     Medial epicondylitis      Onychomycosis      Osteoarthritis      Otitis Externa     Created by Conversion      Peptic ulcer      Polyneuropathy      Vulvar malignant neoplasm (H)      Past Surgical History:   Procedure Laterality Date     BREAST BIOPSY Right      BREAST BIOPSY Right 01/28/2015     BREAST BIOPSY Right 1/28/2015    Procedure: RIGHT BREAST BIOPSY AFTER WIRE LOCALIZATION AT 0940;  Surgeon: Renée Soriano MD;  Location: [facility];  Service:      COLONOSCOPY N/A 6/14/2019    Procedure: COLONOSCOPY;  Surgeon: Eduardo Mora MD;  Location: [facility] [place];  Service: Gastroenterology     ESOPHAGOGASTRODUODENOSCOPY N/A 11/6/2018    Procedure: ESOPHAGOGASTRODUODENOSCOPY;  Surgeon: Lit Fernando MD;  Location: [facility];  Service:      HYSTERECTOMY       JOINT REPLACEMENT Left     TKA     MA ABLATE HEART  DYSRHYTHM FOCUS      Description: Catheter Ablation Atrial Fibrillation;  Recorded: 07/31/2012;  Comments: 7/24/12 PVI with Dr. Gardiner and nilay to all 5 pulm veins and CTI fl ablation line as well.     KS BIOPSY OF BREAST, INCISIONAL      Description: Incisional Breast Biopsy;  Recorded: 11/13/2007;  Comments: benign     KS SUPRACERV ABD HYSTERECTOMY      Description: Supracervical Hysterectomy;  Proc Date: 01/01/1985;  Comments: some cervix left!; ovaries intact; done for bleeding     Social History     Socioeconomic History     Marital status:      Spouse name: Not on file     Number of children: Not on file     Years of education: Not on file     Highest education level: Not on file   Occupational History     Not on file   Social Needs     Financial resource strain: Not on file     Food insecurity:     Worry: Not on file     Inability: Not on file     Transportation needs:     Medical: Not on file     Non-medical: Not on file   Tobacco Use     Smoking status: Current Every Day Smoker     Packs/day: 0.50     Types: Cigarettes     Smokeless tobacco: Former User   Substance and Sexual Activity     Alcohol use: Yes     Comment: very little     Drug use: No     Sexual activity: Not on file   Lifestyle     Physical activity:     Days per week: Not on file     Minutes per session: Not on file     Stress: Not on file   Relationships     Social connections:     Talks on phone: Not on file     Gets together: Not on file     Attends [employer] service: Not on file     Active member of club or organization: Not on file     Attends meetings of clubs or organizations: Not on file     Relationship status: Not on file     Intimate partner violence:     Fear of current or ex partner: Not on file     Emotionally abused: Not on file     Physically abused: Not on file     Forced sexual activity: Not on file   Other Topics Concern     Not on file   Social History Narrative     Not on file     Family History   Problem Relation  "Age of Onset     Heart failure Mother      Cancer Other         paternal HX-laryngeal      Alcoholism Sister      No Medical Problems Daughter      No Medical Problems Maternal Grandmother      No Medical Problems Maternal Grandfather      No Medical Problems Paternal Grandmother      No Medical Problems Paternal Grandfather      No Medical Problems Maternal Aunt      No Medical Problems Paternal Aunt      Alcoholism Sister      Alcoholism Brother      Alcohol abuse Father      Cancer Paternal Uncle         Gastric-Alcohol     Cancer Paternal Uncle         gastric-Alcohol     BRCA 1/2 Neg Hx      Breast cancer Neg Hx      Colon cancer Neg Hx      Endometrial cancer Neg Hx      Ovarian cancer Neg Hx        Meds:    Current Outpatient Medications:      ACCU-CHEK SOFTCLIX LANCETS lancets, TEST THREE TIMES DAILY, Disp: 300 each, Rfl: 3     albuterol (PROAIR HFA;PROVENTIL HFA;VENTOLIN HFA) 90 mcg/actuation inhaler, INHALE 2 PUFFS EVERY 4 HOURS AS NEEDED WHEEZING, Disp: 90 g, Rfl: 11     albuterol (PROVENTIL) 2.5 mg /3 mL (0.083 %) nebulizer solution, Take 3 mL (2.5 mg total) by nebulization every 4 (four) hours as needed for wheezing or shortness of breath., Disp: 75 mL, Rfl: 12     atorvastatin (LIPITOR) 20 MG tablet, TAKE 1 TABLET(20 MG) BY MOUTH AT BEDTIME, Disp: 90 tablet, Rfl: 3     BD ULTRA-FINE SHORT PEN NEEDLE 31 gauge x 5/16\" Ndle, TEST FOUR TIMES DAILY WITH MEALS AND AT BEDTIME, Disp: 400 each, Rfl: 3     blood glucose test strips, Use 1 each As Directed 3 (three) times a day as needed (for blood glucose testing)., Disp: 100 strip, Rfl: 11     blood-glucose meter (ONETOUCH VERIO IQ METER) Misc, Check blood sugar three times a day., Disp: 1 each, Rfl: 0     budesonide-formoterol (SYMBICORT) 160-4.5 mcg/actuation inhaler, Inhale 2 puffs 2 (two) times a day., Disp: 1 Inhaler, Rfl: 2     diltiazem (CARTIA XT) 120 MG 24 hr capsule, Take 1 capsule (120 mg total) by mouth daily., Disp: 90 capsule, Rfl: 1     " furosemide (LASIX) 20 MG tablet, Take 1 tablet (20 mg total) by mouth daily as needed., Disp: 90 tablet, Rfl: 3     gabapentin (NEURONTIN) 300 MG capsule, Take 300 mg by mouth 2 (two) times a day. 300 mg two times a day, 600 mg at bedtime   , Disp: , Rfl:      gabapentin (NEURONTIN) 600 MG tablet, Take 1 tablet (600 mg total) by mouth 3 (three) times a day. (Patient taking differently: Take 600 mg by mouth at bedtime. 300 mg two times a day, 600 mg at bedtime   ), Disp: 90 tablet, Rfl: 3     generic lancets (FINGERSTIX LANCETS), Dispense brand per patient's insurance at pharmacy discretion., Disp: 300 each, Rfl: 0     gentamicin (GARAMYCIN) 0.1 % ointment, APPLY TOPICALLY TO WOUND BID, Disp: , Rfl: 0     insulin aspart U-100 (NOVOLOG) 100 unit/mL injection, Inject under the skin 3 (three) times a day before meals. Inject per sliding scale, Disp: , Rfl:      ipratropium-albuterol (DUO-NEB) 0.5-2.5 mg/3 mL nebulizer, INAHALE 1 VIAL IN NEBULIZER EVERY 4 HOURS AS NEEDED, Disp: 1440 mL, Rfl: 0     meclizine (ANTIVERT) 25 mg tablet, TAKE 1 TABLET(25 MG) BY MOUTH THREE TIMES DAILY AS NEEDED FOR DIZZINESS OR NAUSEA, Disp: 45 tablet, Rfl: 5     metFORMIN (GLUCOPHAGE) 500 MG tablet, TAKE 1 TABLET(500 MG) BY MOUTH TWICE DAILY WITH MEALS (Patient taking differently: 500mg bid), Disp: 180 tablet, Rfl: 3     nystatin (NYSTOP) powder, Apply 1 application topically 2 (two) times a day as needed. 2-3 times to affected area(s)., Disp: 15 g, Rfl: 3     oxyCODONE (ROXICODONE) 10 mg immediate release tablet, Take 1 tablet (10 mg total) by mouth every 6 (six) hours as needed., Disp: 120 tablet, Rfl: 0     polyethylene glycol (MIRALAX) 17 gram packet, Take 1 packet (17 g total) by mouth daily. (Patient taking differently: Take 17 g by mouth daily as needed.    ), Disp: , Rfl: 0     ranitidine (ZANTAC) 150 MG tablet, TAKE 1 TABLET(150 MG) BY MOUTH TWICE DAILY, Disp: 180 tablet, Rfl: 3     sucralfate (CARAFATE) 1 gram tablet, TAKE 1 TABLET  "BY MOUTH FOUR TIMES DAILY(CRUSH TABLET AND MIX IT WITH A LITTLE WATER, THEN SWALLOW), Disp: 120 tablet, Rfl: 12     SYMBICORT 160-4.5 mcg/actuation inhaler, INHALE 2 PUFFS BY MOUTH TWICE DAILY, Disp: 1 Inhaler, Rfl: 2     warfarin (COUMADIN/JANTOVEN) 5 MG tablet, Take 2.5 to 5mg (1/2 or 1 tabs) by mouth daily as directed.  Adjust dose based on INR. Take 2.5 mg Monday and 5 mg the rest of the week, to be started in 2 day, hold if dark stool., Disp: , Rfl:      diaper,brief,adult,disposable (ADULT BRIEFS - LARGE) Misc, Use 3-4 daily as needed for incontinence, Disp: 120 each, Rfl: 6    Current Facility-Administered Medications:      lidocaine 1%-EPINEPHrine 1:100,000 1 %-1:100,000 injection 1 mL (XYLOCAINE W/EPI), 1 mL, Other, Once, Cammy Bui MD    Allergies:  Allergies   Allergen Reactions     Celebrex [Celecoxib] Rash     patient had butterfly rash - \"lupus-like\"       Latex Rash       ROS:  Pertinent positives as noted in HPI; otherwise 12 point ROS negative.      Physical Exam:  EXAM:  /50 (Patient Site: Right Arm, Patient Position: Sitting, Cuff Size: Adult Regular)   Pulse 76   Wt 167 lb (75.8 kg)   BMI 27.79 kg/m     Gen:  NAD, appears well, well-hydrated  HEENT:  TMs nl, oropharynx benign, nasal mucosa nl, conjunctiva clear  Neck:  Supple, no adenopathy, no thyromegaly, no carotid bruits, no JVD  Lungs:  Clear to auscultation bilaterally  Cor:  RRR no murmur  Abd:  Soft, nontender, BS+, no masses, no guarding or rebound, no HSM  Extr:  Multiple trigger points - right suprascapular/cervical/shoulder  Neuro:  No asymmetry  Skin:  Warm/dry      Procedure Note - Trigger Point Injections    Consent obtained:  verbal    Indication:  Fibromyalgia/trigger point pain    5 trigger points identified.  Each trigger point swabbed x 3 with Betadine swab.  Each trigger point then injected with 2 ml of 1% Lidocaine (no epi).    Total volume injected:  10 ml    Complications:  None, patient " tolerated procedure well.    Plan:  Discussed care with patient.  RTC for further injections in 30 days prn.        Results:  Results for orders placed or performed in visit on 08/16/19   INR   Result Value Ref Range    INR 1.50 (H) 0.90 - 1.10   Drugs of Abuse 1,Urine   Result Value Ref Range    Amphetamines (!) Screen Negative     Screen Positive (Confirmation available on request)    Benzodiazepines Screen Negative Screen Negative    Opiates (!) Screen Negative     Screen Positive (Confirmation available on request)    Phencyclidine Screen Negative Screen Negative    THC (!) Screen Negative     Screen Positive (Confirmation available on request)    Barbiturates Screen Negative Screen Negative    Cocaine Metabolite Screen Negative Screen Negative    Oxycodone Screen Negative Screen Negative    Creatinine, Urine 156.7 mg/dL   Microalbumin, Random Urine   Result Value Ref Range    Microalbumin, Random Urine 1.70 0.00 - 1.99 mg/dL    Creatinine, Urine 156.7 mg/dL    Microalbumin/Creatinine Ratio Random Urine 10.8 <=19.9 mg/g

## 2021-05-31 NOTE — TELEPHONE ENCOUNTER
RN cannot approve Refill Request    RN can NOT refill this medication med is not covered by policy/route to provider. Last office visit: 7/17/2019 Cammy Bui MD Last Physical: 6/3/2019 Last MTM visit: Visit date not found Last visit same specialty: 7/17/2019 Cammy Bui MD.  Next visit within 3 mo: Visit date not found  Next physical within 3 mo: Visit date not found      [facility] Triage/Med Refill 8/8/2019    Requested Prescriptions   Pending Prescriptions Disp Refills     SYMBICORT 160-4.5 mcg/actuation inhaler [Pharmacy Med Name: SYMBICORT 160/4.5MCG (120 ORAL INH)]  0     Sig: INHALE 2 PUFFS BY MOUTH TWICE DAILY       There is no refill protocol information for this order

## 2021-05-31 NOTE — PROGRESS NOTES
[facility] Care Coordination Contact    Member requesting for brief as Gerardo does not provide this service. Member is aware request will be sent to [facility].    CC emailed request to [facility].    Samantha Alexandra RN, PHN   [facility]  [phone]

## 2021-05-31 NOTE — PROGRESS NOTES
[facility] Care Coordination Contact    Returned call per  voicemail, not available and left voice message.    Samantha Alexandra RN, PHN   [facility]  [phone]

## 2021-05-31 NOTE — TELEPHONE ENCOUNTER
ANTICOAGULATION  MANAGEMENT    Assessment     Today's INR result of 1.5 is Subtherapeutic (goal INR of 2.0-3.0)        Warfarin taken differently than instructed, but no impact to total weekly dose    No new diet changes affecting INR    No new medication/supplements affecting INR    Continues to tolerate warfarin with no reported s/s of bleeding or thromboembolism     Previous INR was Therapeutic    Plan:     Spoke with Mary Kay regarding INR result and instructed:     Warfarin Dosing Instructions:  Take booster dose of 7.5 mg today only then change warfarin dose to 5 mg daily  (7.7 % change)    Instructed patient to follow up no later than: 2 weeks.     Education provided: importance of taking warfarin as instructed    Mary Kay verbalizes understanding and agrees to warfarin dosing plan.    Instructed to call the [facility] for any changes, questions or concerns. (#[phone])   ?   Raciel West RN    Subjective/Objective:      Mary Kaymarlene Tejada, a 69 y.o. female is on warfarin.     Mary Kay reports:     Home warfarin dose: verbally confirmed home dose with pt and updated on anticoagulation calendar     Missed doses: No     Medication changes:  No     S/S of bleeding or thromboembolism:  No     New Injury or illness:  No     Changes in diet or alcohol consumption:  No     Upcoming surgery, procedure or cardioversion:  No    Anticoagulation Episode Summary     Current INR goal:   2.0-3.0   TTR:   55.9 % (2.6 y)   Next INR check:   8/30/2019   INR from last check:   1.50! (8/16/2019)   Weekly max warfarin dose:      Target end date:   Indefinite   INR check location:      Preferred lab:      Send INR reminders to:   [facility]    Indications    Paroxysmal Atrial Fibrillation [I48.91]           Comments:            Anticoagulation Care Providers     Provider Role Specialty Phone number    Cammy Bui MD Referring Family Medicine [phone]

## 2021-06-01 ENCOUNTER — COMMUNICATION - HEALTHEAST (OUTPATIENT)
Dept: GERIATRIC MEDICINE | Facility: CLINIC | Age: 71
End: 2021-06-01

## 2021-06-01 ENCOUNTER — RECORDS - HEALTHEAST (OUTPATIENT)
Dept: ADMINISTRATIVE | Facility: CLINIC | Age: 71
End: 2021-06-01

## 2021-06-01 VITALS — BODY MASS INDEX: 31.6 KG/M2 | WEIGHT: 187 LBS

## 2021-06-01 VITALS — BODY MASS INDEX: 30.66 KG/M2 | HEIGHT: 65 IN | WEIGHT: 184 LBS

## 2021-06-01 VITALS — BODY MASS INDEX: 30.49 KG/M2 | WEIGHT: 183 LBS | HEIGHT: 65 IN

## 2021-06-01 VITALS — BODY MASS INDEX: 30.59 KG/M2 | WEIGHT: 181 LBS

## 2021-06-01 VITALS — WEIGHT: 191.31 LBS | BODY MASS INDEX: 32.33 KG/M2

## 2021-06-01 VITALS — WEIGHT: 193.13 LBS | HEIGHT: 65 IN | BODY MASS INDEX: 32.18 KG/M2

## 2021-06-01 VITALS — BODY MASS INDEX: 34.67 KG/M2 | WEIGHT: 202 LBS

## 2021-06-01 VITALS — BODY MASS INDEX: 31.7 KG/M2 | WEIGHT: 187.6 LBS

## 2021-06-01 VITALS — BODY MASS INDEX: 30.93 KG/M2 | WEIGHT: 183 LBS

## 2021-06-01 NOTE — PROGRESS NOTES
"Cardiology Progress Note    Assessment:  Paroxysmal atrial fibrillation status post PVI in 2012, no recurrent documented episodes  Paroxysmal atrial flutter status post remote ablation, no new episodes  Aortic stenosis, moderate [mean gradient 25], stable  Diabetes mellitus  COPD  Fibromyalgia      Plan:  Continue current cardiac medications  Reassess aortic valve gradient with echo    Follow-up 1 year    Subjective:   This is 69 y.o. female who comes in today for follow-up visit.  She reports no prolonged episode of heart racing.  On rare occasion she will have a brief skipping of the heart rate.  She denies syncope or near syncope.  She is always short of breath but denies any recent worsening.  She has lost some weight lately.  She has not had chest pains.    Review of Systems:   General: WNL  Eyes: WNL  Ears/Nose/Throat: WNL  Lungs: WNL  Heart: WNL  Stomach: WNL  Bladder: WNL  Muscle/Joints: WNL  Skin: WNL  Nervous System: WNL  Mental Health: WNL     Blood: WNL    Objective:   /56 (Patient Site: Right Arm, Patient Position: Sitting, Cuff Size: Adult Regular)   Pulse 80   Resp 16   Ht 5' 5\" (1.651 m)   Wt 169 lb (76.7 kg)   BMI 28.12 kg/m    Physical Exam:  GENERAL: no distress  NECK: No JVD  LUNGS: Decreased breath sounds bilaterally  CARDIAC: regular rhythm, S1 & S2 normal.  No heaves, thrills, gallops 2/6 systolic ejection murmur at the aortic area  ABDOMEN: flat, negative hepatosplenomegaly, soft and non-tender.  EXTREMITIES: No evidence of cyanosis, clubbing or edema.    Current Outpatient Medications   Medication Sig Dispense Refill     ACCU-CHEK SOFTCLIX LANCETS lancets TEST THREE TIMES DAILY 300 each 3     albuterol (PROAIR HFA;PROVENTIL HFA;VENTOLIN HFA) 90 mcg/actuation inhaler INHALE 2 PUFFS EVERY 4 HOURS AS NEEDED WHEEZING 90 g 11     albuterol (PROVENTIL) 2.5 mg /3 mL (0.083 %) nebulizer solution Take 3 mL (2.5 mg total) by nebulization every 4 (four) hours as needed for wheezing or " "shortness of breath. 75 mL 12     atorvastatin (LIPITOR) 20 MG tablet TAKE 1 TABLET(20 MG) BY MOUTH AT BEDTIME 90 tablet 3     BD ULTRA-FINE SHORT PEN NEEDLE 31 gauge x 5/16\" Ndle TEST FOUR TIMES DAILY WITH MEALS AND AT BEDTIME 400 each 3     blood glucose test strips Use 1 each As Directed 3 (three) times a day as needed (for blood glucose testing). 100 strip 11     blood-glucose meter (ONETOUCH VERIO IQ METER) Misc Check blood sugar three times a day. 1 each 0     budesonide-formoterol (SYMBICORT) 160-4.5 mcg/actuation inhaler Inhale 2 puffs 2 (two) times a day. 1 Inhaler 2     diltiazem (CARTIA XT) 120 MG 24 hr capsule Take 1 capsule (120 mg total) by mouth daily. 90 capsule 3     furosemide (LASIX) 20 MG tablet Take 1 tablet (20 mg total) by mouth daily as needed. 90 tablet 3     gabapentin (NEURONTIN) 600 MG tablet Take 1 tablet (600 mg total) by mouth 3 (three) times a day. 90 tablet 3     generic lancets (FINGERSTIX LANCETS) Dispense brand per patient's insurance at pharmacy discretion. 300 each 0     insulin aspart U-100 (NOVOLOG) 100 unit/mL injection Inject under the skin 3 (three) times a day before meals. Inject per sliding scale       ipratropium-albuterol (DUO-NEB) 0.5-2.5 mg/3 mL nebulizer INAHALE 1 VIAL IN NEBULIZER EVERY 4 HOURS AS NEEDED 1440 mL 0     meclizine (ANTIVERT) 25 mg tablet TAKE 1 TABLET(25 MG) BY MOUTH THREE TIMES DAILY AS NEEDED FOR DIZZINESS OR NAUSEA 45 tablet 5     metFORMIN (GLUCOPHAGE) 500 MG tablet TAKE 1 TABLET(500 MG) BY MOUTH TWICE DAILY WITH MEALS (Patient taking differently: 500mg bid) 180 tablet 3     nystatin (NYSTOP) powder Apply 1 application topically 2 (two) times a day as needed. 2-3 times to affected area(s). 15 g 3     oxyCODONE (ROXICODONE) 10 mg immediate release tablet Take 1 tablet (10 mg total) by mouth every 6 (six) hours as needed. 120 tablet 0     polyethylene glycol (MIRALAX) 17 gram packet Take 1 packet (17 g total) by mouth daily. (Patient taking " differently: Take 17 g by mouth daily as needed.       )  0     ranitidine (ZANTAC) 150 MG tablet TAKE 1 TABLET(150 MG) BY MOUTH TWICE DAILY 180 tablet 3     sucralfate (CARAFATE) 1 gram tablet TAKE 1 TABLET BY MOUTH FOUR TIMES DAILY(CRUSH TABLET AND MIX IT WITH A LITTLE WATER, THEN SWALLOW) 120 tablet 12     warfarin (COUMADIN/JANTOVEN) 5 MG tablet Take 2.5 to 5mg (1/2 or 1 tabs) by mouth daily as directed.  Adjust dose based on INR.  Take 2.5 mg Monday and 5 mg the rest of the week, to be started in 2 day, hold if dark stool.       cyclobenzaprine (FLEXERIL) 10 MG tablet Take 1 tablet (10 mg total) by mouth at bedtime as needed for muscle spasms. 30 tablet 0     diaper,brief,adult,disposable (ADULT BRIEFS - LARGE) Misc Use 3-4 daily as needed for incontinence 120 each 6     gabapentin (NEURONTIN) 300 MG capsule Take 300 mg by mouth 2 (two) times a day. 300 mg two times a day, 600 mg at bedtime             gentamicin (GARAMYCIN) 0.1 % ointment APPLY TOPICALLY TO WOUND BID  0     SYMBICORT 160-4.5 mcg/actuation inhaler INHALE 2 PUFFS BY MOUTH TWICE DAILY 1 Inhaler 2     Current Facility-Administered Medications   Medication Dose Route Frequency Provider Last Rate Last Dose     lidocaine 1%-EPINEPHrine 1:100,000 1 %-1:100,000 injection 1 mL (XYLOCAINE W/EPI)  1 mL Other Once Cammy Bui MD           Cardiographics:     Echo: August 2018    When compared to the previous study dated 8/31/2017, no significant change.    The estimated left ventricular ejection fraction is 65%.    Normal right ventricular size and systolic function.    Moderate aortic stenosis with trace regurgitation.       Stress Test: 2010  Normal perfusion scan    Lab Results:       Lab Results   Component Value Date    CHOL 160 06/03/2019    CHOL 169 10/17/2018    CHOL 201 (H) 12/19/2017     Lab Results   Component Value Date    HDL 69 06/03/2019    HDL 80 10/17/2018    HDL 89 12/19/2017     Lab Results   Component Value Date     LDLCALC 79 06/03/2019    LDLCALC 73 10/17/2018    LDLCALC 88 12/19/2017     Lab Results   Component Value Date    TRIG 59 06/03/2019    TRIG 78 10/17/2018    TRIG 119 12/19/2017     BNP   Date Value Ref Range Status   03/01/2016 30 0 - 109 pg/mL Final       Surendra (Bao)  MD Jerica

## 2021-06-01 NOTE — TELEPHONE ENCOUNTER
Who is calling:  Patient  Reason for Call:  Patient stated she would like Cammy Bui MD to call her back about her drug test results. Patient stated she Googled up her ranitidine, metformin, and albuterol medications. Patient stated she read that these 3 medications could cause her to have a trace of methamphetamine in her system. Patient stated she would never be on methamphetamine because her daughter  from and she know enough people to stay away from it. Patient stated she would like to know what may have caused this false positive. Please have Cammy Bui MD call her back today.  Date of last appointment with primary care: 19  Okay to leave a detailed message: Yes  [phone], patient stated she will not be available from 12 PM-2 PM today.

## 2021-06-01 NOTE — TELEPHONE ENCOUNTER
ANTICOAGULATION  MANAGEMENT PROGRAM    Mary Kay Tejada is overdue for INR check.  Reminder call made.    Unable to reach Mary Kay and was unable to leave a voice mail.    Marija Crawley RN
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
## 2021-06-01 NOTE — TELEPHONE ENCOUNTER
ANTICOAGULATION  MANAGEMENT    Assessment     Today's INR result of 2.7 is Therapeutic (goal INR of 2.0-3.0)        Warfarin taken as previously instructed    No new diet changes affecting INR    No interaction expected between flexeril and warfarin    Continues to tolerate warfarin with no reported s/s of bleeding or thromboembolism     Previous INR was Subtherapeutic    Plan:     Left a detailed message for Mary Kay regarding INR result and instructed:     Warfarin Dosing Instructions:  Continue current warfarin dose 5 mg daily.(0 % change)    Instructed patient to follow up no later than: three week.    Education provided:     Instructed to call the [facility] for any changes, questions or concerns. (#[phone])   ?   Mena Dumont RN    Subjective/Objective:      Mary Kay Tejada, a 69 y.o. female is on warfarin.     Mary Kay reports:     Home warfarin dose: as updated on anticoagulation calendar per template     Missed doses: No     Medication changes:  Yes: flexeril per office visit note.      S/S of bleeding or thromboembolism:  No     New Injury or illness:  Yes: seen MD for Fibromyalgia today.     Changes in diet or alcohol consumption:  No     Upcoming surgery, procedure or cardioversion:  No    Anticoagulation Episode Summary     Current INR goal:   2.0-3.0   TTR:   60.0 %   Next INR check:   10/8/2019   INR from last check:   2.70 (9/17/2019)   Weekly max warfarin dose:      Target end date:   Indefinite   INR check location:      Preferred lab:      Send INR reminders to:   [facility]    Indications    Paroxysmal Atrial Fibrillation [I48.91]           Comments:            Anticoagulation Care Providers     Provider Role Specialty Phone number    Cammy Bui MD Referring Family Medicine [phone]

## 2021-06-01 NOTE — TELEPHONE ENCOUNTER
Who is calling:  The patient  Reason for Call:  The patient requests a phone call from Cammy Bui MD regarding labs drawn today. The patient declined to offer more details.   Date of last appointment with primary care: 9/17/2019  Okay to leave a detailed message: No

## 2021-06-01 NOTE — TELEPHONE ENCOUNTER
ANTICOAGULATION  MANAGEMENT PROGRAM    Mary Kay Tejada is overdue for INR check.  Reminder call made.    Spoke with Mary Kay who declined to schedule INR at this time.  If calling back, please schedule INR check as soon as possible.    Marija Crawley RN

## 2021-06-01 NOTE — TELEPHONE ENCOUNTER
Called patient - she is adamant that she has never used meth/amphetamines (as per last visit urine tox screen).  She has several family members who have used and  as a result (including her daughter, Shalini).  She is very surprised and concerned by this result.  She does admit to occ. THC use.  Last used her opiate pain med several days ago - actually ran out before the month was over, but knew she couldn't fill early.  She tells me she sometimes takes more when she has severe pain.    Review of literature suggests the likelihood of false positives with 3 medications she currently takes.  We will continue her current medications, continue to monitor drug screens and follow up next month.  I will send a prescription today.

## 2021-06-01 NOTE — PATIENT INSTRUCTIONS - HE
Mary Kay Tejada,    It was a pleasure to see you today at the [facility].     My recommendations after this visit include:    jeri Pizano MD, FACC, ZAC

## 2021-06-02 ENCOUNTER — RECORDS - HEALTHEAST (OUTPATIENT)
Dept: ADMINISTRATIVE | Facility: CLINIC | Age: 71
End: 2021-06-02

## 2021-06-02 VITALS — BODY MASS INDEX: 29.85 KG/M2 | HEIGHT: 65 IN | WEIGHT: 179.19 LBS

## 2021-06-02 VITALS — WEIGHT: 179 LBS | BODY MASS INDEX: 30.25 KG/M2

## 2021-06-02 VITALS — BODY MASS INDEX: 28.79 KG/M2 | WEIGHT: 173 LBS

## 2021-06-02 VITALS — BODY MASS INDEX: 28.7 KG/M2 | WEIGHT: 172.44 LBS

## 2021-06-02 VITALS — BODY MASS INDEX: 30.26 KG/M2 | WEIGHT: 179.05 LBS

## 2021-06-02 VITALS — BODY MASS INDEX: 28.51 KG/M2 | WEIGHT: 171.31 LBS

## 2021-06-02 NOTE — TELEPHONE ENCOUNTER
I will send in medication for vertigo.  I would have to look at her ear to determine if there is an infection needing antibiotics or drops.

## 2021-06-02 NOTE — PROGRESS NOTES
[facility] Care Coordination Contact     CHWmarbella w/ friend who answered member phone  to have member rtn call.     KIANNA Carbajal  [facility]  [phone]

## 2021-06-02 NOTE — TELEPHONE ENCOUNTER
Who is calling:  Patient    Reason for Call:      Lost  for her ONE TOUCH VERIO IQ her pharmacy request that she reorders the  ONE TOUCH VERIO IQ kit and test strips.     Date of last appointment with primary care: 10/15/19    Okay to leave a detailed message: Yes      Please call patient when this Rx is sent to pharmacy.

## 2021-06-02 NOTE — TELEPHONE ENCOUNTER
Refill Approved    Rx renewed per Medication Renewal Policy.     BG Meter was last renewed on 7/12/19 #1 meter R-0.  BG Test Strips was last renewed on 7/17/19  #100 R-11.    *Patient needs new strips to be compatible with meter    Last OV 10/15/19    Cyndi Hunt, Care Connection Triage/Med Refill 10/30/2019     Requested Prescriptions   Pending Prescriptions Disp Refills     blood-glucose meter (ONETOUCH VERIO IQ METER) Misc 1 each 0     Sig: Check blood sugar three times a day.       Diabetic Supplies Refill Protocol Passed - 10/30/2019 10:41 AM        Passed - Visit with PCP or prescribing provider visit in last 6 months     Last office visit with prescriber/PCP: 10/15/2019 Cammy Bui MD OR same dept: 10/15/2019 Cammy Bui MD OR same specialty: 10/15/2019 Cammy Bui MD  Last physical: 6/3/2019 Last MTM visit: Visit date not found   Next visit within 3 mo: Visit date not found  Next physical within 3 mo: Visit date not found  Prescriber OR PCP: Cammy Bui MD  Last diagnosis associated with med order: 1. Type 2 diabetes mellitus with hypoglycemia without coma, without long-term current use of insulin (H)  - blood-glucose meter (ONETOUCH VERIO IQ METER) Misc; Check blood sugar three times a day.  Dispense: 1 each; Refill: 0  - blood glucose test strips; Use 1 each As Directed 3 (three) times a day as needed (for blood glucose testing).  Dispense: 100 strip; Refill: 11    If protocol passes may refill for 12 months if within 3 months of last provider visit (or a total of 15 months).             Passed - A1C in last 6 months     Hemoglobin A1c   Date Value Ref Range Status   06/03/2019 5.3 3.5 - 6.0 % Final               blood glucose test strips 100 strip 11     Sig: Use 1 each As Directed 3 (three) times a day as needed (for blood glucose testing).       Diabetic Supplies Refill Protocol Passed - 10/30/2019 10:41 AM        Passed - Visit with  PCP or prescribing provider visit in last 6 months     Last office visit with prescriber/PCP: 10/15/2019 Cammy Bui MD OR same dept: 10/15/2019 Cammy Bui MD OR same specialty: 10/15/2019 Cammy Bui MD  Last physical: 6/3/2019 Last MTM visit: Visit date not found   Next visit within 3 mo: Visit date not found  Next physical within 3 mo: Visit date not found  Prescriber OR PCP: Cammy Bui MD  Last diagnosis associated with med order: 1. Type 2 diabetes mellitus with hypoglycemia without coma, without long-term current use of insulin (H)  - blood-glucose meter (ONETOUCH VERIO IQ METER) Misc; Check blood sugar three times a day.  Dispense: 1 each; Refill: 0  - blood glucose test strips; Use 1 each As Directed 3 (three) times a day as needed (for blood glucose testing).  Dispense: 100 strip; Refill: 11    If protocol passes may refill for 12 months if within 3 months of last provider visit (or a total of 15 months).             Passed - A1C in last 6 months     Hemoglobin A1c   Date Value Ref Range Status   06/03/2019 5.3 3.5 - 6.0 % Final

## 2021-06-02 NOTE — PROGRESS NOTES
ASSESSMENT/PLAN:  1. Fibromyalgia  lidocaine 10 mg/mL (1 %) injection 10 mL   2. Type 2 diabetes mellitus (H)  metFORMIN (GLUCOPHAGE) 500 MG tablet   3. GERD (gastroesophageal reflux disease)  ranitidine (ZANTAC) 150 MG tablet   4. Chronic obstructive pulmonary disease with acute lower respiratory infection (H)  albuterol (PROAIR HFA;PROVENTIL HFA;VENTOLIN HFA) 90 mcg/actuation inhaler   5. COPD exacerbation (H)  budesonide-formoterol (SYMBICORT) 160-4.5 mcg/actuation inhaler    DISCONTINUED: budesonide-formoterol (SYMBICORT) 160-4.5 mcg/actuation inhaler   6. Chronic pain syndrome  oxyCODONE (ROXICODONE) 10 mg immediate release tablet   7. Acute bacterial conjunctivitis of both eyes  polymyxin B-trimethoprim (FOR POLYTRIM) 10,000 unit- 1 mg/mL Drop ophthalmic drops       This is a 68 yo female with:  1  Fibromyalgia - here for trigger point injections - see procedure note  2.  Type II DM - takes Metformin - needs refills  3.  GE reflux - uses Ranitidine - may need to change due to recent recalls of medication -   4.  COPD - at risk for recurrent bronchitis - uses inhaler therapy - updated chart to reflect current inhalers  5.  Chronic pain syndrome - uses Oxycodone - I would do urine tox screen at next visit  7.  Acute bacterial conjunctivitis - bilateral eyes - crusting around eyes - will use eye drops - will need re-evaluation if not improving  Return in about 1 month (around 11/15/2019) for trigger point injections/re-evaluation of chronic pain.  Administrations This Visit     lidocaine 10 mg/mL (1 %) injection 10 mL     Admin Date  10/15/2019 Action  Given Dose  10 mL Route  Other Administered By  Jessie Braun CMA                Medications Discontinued During This Encounter   Medication Reason     metFORMIN (GLUCOPHAGE) 500 MG tablet Reorder     ranitidine (ZANTAC) 150 MG tablet Reorder     albuterol (PROAIR HFA;PROVENTIL HFA;VENTOLIN HFA) 90 mcg/actuation inhaler Reorder     budesonide-formoterol  "(SYMBICORT) 160-4.5 mcg/actuation inhaler Reorder     oxyCODONE (ROXICODONE) 10 mg immediate release tablet Reorder     budesonide-formoterol (SYMBICORT) 160-4.5 mcg/actuation inhaler Reorder     There are no Patient Instructions on file for this visit.    Chief Complaint:  Chief Complaint   Patient presents with     trigger point injection       HPI:   Mary Kay Tejada is a 69 y.o. female c/o  1.  Pain - upper back  2.  Chronic pain - using oxycodone - \"I've stopped marijuana\"  3.  Needs refill s on medications today  4.  Recent URI - improving - but now with crusting in eyes -     PMH:   Patient Active Problem List    Diagnosis Date Noted     Mixed stress and urge urinary incontinence 08/16/2019     Skin lesion 07/17/2019     Chronic a-fib      Dyslipidemia      Upper GI bleed 12/13/2018     Melena 12/13/2018     Anemia due to blood loss, acute 07/18/2018     Diabetic polyneuropathy associated with type 2 diabetes mellitus (H) 07/18/2018     Chronic anemia 06/27/2018     Cataract 11/06/2017     Bunion 07/19/2017     Callus of foot 07/19/2017     Nonrheumatic aortic valve stenosis 05/23/2017     COPD (chronic obstructive pulmonary disease) (H) 05/23/2017     Chronic obstructive pulmonary disease with acute lower respiratory infection (H) 12/24/2016     Gastroenteritis 12/24/2016     Syncope 12/22/2016     Opacity of lung on imaging study 12/22/2016     Acute gastritis 12/22/2016     Osteoarthritis of both knees 08/22/2016     Osteoarthritis, hip, bilateral 06/20/2016     Primary osteoarthritis of left knee 06/20/2016     Candidal intertrigo 05/11/2016     Type 2 diabetes mellitus with hypoglycemia (H)      Aspiration pneumonia (H) 03/01/2016     Controlled substance agreement signed 11/23/2015     Cervical neck pain with evidence of disc disease 07/22/2015     Headache 07/17/2015     Hematoma of abdominal wall 07/05/2015     Skin lesion of face 05/22/2015     Tendonitis of wrist, right 04/22/2015     Menopause " 04/06/2015     Flashers or floaters of right eye 02/23/2015     Tendonitis of wrist, left 01/21/2015     Trigger point of thoracic region 01/21/2015     Generalized osteoarthrosis, involving multiple sites 01/14/2015     Vertigo 12/17/2014     Rotator cuff tendonitis 12/17/2014     Abnormal Weight Loss      Osteoarthritis Of The Shoulder      Chronic Constipation      Onychomycosis Of The Toenails      Diarrhea      Ganglion Of The Left Wrist      Larynx Edema      Obesity      Medial Epicondylitis      Skin Lesion      Urinary Incontinence      Chronic Major Depression      Dry Eye Syndrome      Nicotine Dependence      Hypokalemia      Essential hypertension      Muscle Cramps In The Calf      Edema      Atrial flutter (H)      Lump In / On The Skin      Chronic pain syndrome      Paroxysmal Atrial Fibrillation      Fibromyalgia      Nausea      Abdominal Pain      Peptic Ulcer      COPD exacerbation (H)      Mixed hyperlipidemia      Chronic Reflux Esophagitis      Benign Adenomatous Polyp Of The Large Intestine      Past Medical History:   Diagnosis Date     Anemia      Aortic stenosis      Atrial fibrillation (H)      Atrial flutter (H)      Benign neoplasm of adenomatous polyp     large intestine      Chronic constipation      Chronic pain syndrome      COPD (chronic obstructive pulmonary disease) (H)     Oxygen at night      Dependence on supplemental oxygen     Oxygen at noc, during the day as needed     Depression      Diabetes mellitus (H)      Dry eye syndrome      Fibromyalgia      Ganglion     left wrist     GERD (gastroesophageal reflux disease)      Hyperlipidemia      Hypertension      Hypokalemia      Larynx edema      Lung disease      Malignant Vulvar Neoplasm     Created by [facility] Annotation: Apr 17 2007  8:24AM - Cammy Bui:  resection per Dr. Alfonso Mane 9/06;  Replacement Utility updated for latest IMO load     Medial epicondylitis      Onychomycosis       Osteoarthritis      Otitis Externa     Created by Conversion      Peptic ulcer      Polyneuropathy      Vulvar malignant neoplasm (H)      Past Surgical History:   Procedure Laterality Date     BREAST BIOPSY Right      BREAST BIOPSY Right 01/28/2015     BREAST BIOPSY Right 1/28/2015    Procedure: RIGHT BREAST BIOPSY AFTER WIRE LOCALIZATION AT 0940;  Surgeon: Renée Soriano MD;  Location: [facility];  Service:      COLONOSCOPY N/A 6/14/2019    Procedure: COLONOSCOPY;  Surgeon: Eduardo Mora MD;  Location: [facility];  Service: Gastroenterology     ESOPHAGOGASTRODUODENOSCOPY N/A 11/6/2018    Procedure: ESOPHAGOGASTRODUODENOSCOPY;  Surgeon: Lit Fernando MD;  Location: [facility] OR;  Service:      HYSTERECTOMY       JOINT REPLACEMENT Left     TKA     KY ABLATE HEART DYSRHYTHM FOCUS      Description: Catheter Ablation Atrial Fibrillation;  Recorded: 07/31/2012;  Comments: 7/24/12 PVI with Dr. Gardiner and nilay to all 5 pulm veins and CTI fl ablation line as well.     KY BIOPSY OF BREAST, INCISIONAL      Description: Incisional Breast Biopsy;  Recorded: 11/13/2007;  Comments: benign     KY SUPRACERV ABD HYSTERECTOMY      Description: Supracervical Hysterectomy;  Proc Date: 01/01/1985;  Comments: some cervix left!; ovaries intact; done for bleeding     Social History     Socioeconomic History     Marital status:      Spouse name: Not on file     Number of children: Not on file     Years of education: Not on file     Highest education level: Not on file   Occupational History     Not on file   Social Needs     Financial resource strain: Not on file     Food insecurity:     Worry: Not on file     Inability: Not on file     Transportation needs:     Medical: Not on file     Non-medical: Not on file   Tobacco Use     Smoking status: Current Every Day Smoker     Packs/day: 0.50     Types: Cigarettes     Smokeless tobacco: Never Used   Substance and Sexual Activity     Alcohol use: Yes      Comment: very little     Drug use: No     Sexual activity: Not on file   Lifestyle     Physical activity:     Days per week: Not on file     Minutes per session: Not on file     Stress: Not on file   Relationships     Social connections:     Talks on phone: Not on file     Gets together: Not on file     Attends Nondenominational service: Not on file     Active member of club or organization: Not on file     Attends meetings of clubs or organizations: Not on file     Relationship status: Not on file     Intimate partner violence:     Fear of current or ex partner: Not on file     Emotionally abused: Not on file     Physically abused: Not on file     Forced sexual activity: Not on file   Other Topics Concern     Not on file   Social History Narrative     Not on file     Family History   Problem Relation Age of Onset     Heart failure Mother      Cancer Other         paternal HX-laryngeal      Alcoholism Sister      No Medical Problems Daughter      No Medical Problems Maternal Grandmother      No Medical Problems Maternal Grandfather      No Medical Problems Paternal Grandmother      No Medical Problems Paternal Grandfather      No Medical Problems Maternal Aunt      No Medical Problems Paternal Aunt      Alcoholism Sister      Alcoholism Brother      Alcohol abuse Father      Cancer Paternal Uncle         Gastric-Alcohol     Cancer Paternal Uncle         gastric-Alcohol     BRCA 1/2 Neg Hx      Breast cancer Neg Hx      Colon cancer Neg Hx      Endometrial cancer Neg Hx      Ovarian cancer Neg Hx        Meds:    Current Outpatient Medications:      ACCU-CHEK SOFTCLIX LANCETS lancets, TEST THREE TIMES DAILY, Disp: 300 each, Rfl: 3     albuterol (PROAIR HFA;PROVENTIL HFA;VENTOLIN HFA) 90 mcg/actuation inhaler, INHALE 2 PUFFS EVERY 4 HOURS AS NEEDED WHEEZING, Disp: 90 g, Rfl: 11     albuterol (PROVENTIL) 2.5 mg /3 mL (0.083 %) nebulizer solution, Take 3 mL (2.5 mg total) by nebulization every 4 (four) hours as needed for  "wheezing or shortness of breath., Disp: 75 mL, Rfl: 12     atorvastatin (LIPITOR) 20 MG tablet, TAKE 1 TABLET(20 MG) BY MOUTH AT BEDTIME, Disp: 90 tablet, Rfl: 3     BD ULTRA-FINE SHORT PEN NEEDLE 31 gauge x 5/16\" Ndle, TEST FOUR TIMES DAILY WITH MEALS AND AT BEDTIME, Disp: 400 each, Rfl: 3     blood glucose test strips, Use 1 each As Directed 3 (three) times a day as needed (for blood glucose testing)., Disp: 100 strip, Rfl: 11     blood-glucose meter (ONETOUCH VERIO IQ METER) Misc, Check blood sugar three times a day., Disp: 1 each, Rfl: 0     budesonide-formoterol (SYMBICORT) 160-4.5 mcg/actuation inhaler, Inhale 2 puffs 2 (two) times a day., Disp: 1 Inhaler, Rfl: 2     cyclobenzaprine (FLEXERIL) 10 MG tablet, Take 1 tablet (10 mg total) by mouth at bedtime as needed for muscle spasms., Disp: 30 tablet, Rfl: 0     diaper,brief,adult,disposable (ADULT BRIEFS - LARGE) Misc, Use 3-4 daily as needed for incontinence, Disp: 120 each, Rfl: 6     diltiazem (CARTIA XT) 120 MG 24 hr capsule, Take 1 capsule (120 mg total) by mouth daily., Disp: 90 capsule, Rfl: 3     furosemide (LASIX) 20 MG tablet, Take 1 tablet (20 mg total) by mouth daily as needed., Disp: 90 tablet, Rfl: 3     gabapentin (NEURONTIN) 300 MG capsule, Take 300 mg by mouth 2 (two) times a day. 300 mg two times a day, 600 mg at bedtime   , Disp: , Rfl:      gabapentin (NEURONTIN) 600 MG tablet, Take 1 tablet (600 mg total) by mouth 3 (three) times a day., Disp: 90 tablet, Rfl: 3     generic lancets (FINGERSTIX LANCETS), Dispense brand per patient's insurance at pharmacy discretion., Disp: 300 each, Rfl: 0     gentamicin (GARAMYCIN) 0.1 % ointment, APPLY TOPICALLY TO WOUND BID, Disp: , Rfl: 0     insulin aspart U-100 (NOVOLOG) 100 unit/mL injection, Inject under the skin 3 (three) times a day before meals. Inject per sliding scale, Disp: , Rfl:      ipratropium-albuterol (DUO-NEB) 0.5-2.5 mg/3 mL nebulizer, INAHALE 1 VIAL IN NEBULIZER EVERY 4 HOURS AS " "NEEDED, Disp: 1440 mL, Rfl: 0     meclizine (ANTIVERT) 25 mg tablet, TAKE 1 TABLET(25 MG) BY MOUTH THREE TIMES DAILY AS NEEDED FOR DIZZINESS OR NAUSEA, Disp: 45 tablet, Rfl: 5     metFORMIN (GLUCOPHAGE) 500 MG tablet, Take 1 tablet (500 mg total) by mouth 2 (two) times a day with meals., Disp: 180 tablet, Rfl: 3     nystatin (NYSTOP) powder, Apply 1 application topically 2 (two) times a day as needed. 2-3 times to affected area(s)., Disp: 15 g, Rfl: 3     oxyCODONE (ROXICODONE) 10 mg immediate release tablet, Take 1 tablet (10 mg total) by mouth every 6 (six) hours as needed., Disp: 120 tablet, Rfl: 0     polyethylene glycol (MIRALAX) 17 gram packet, Take 1 packet (17 g total) by mouth daily. (Patient taking differently: Take 17 g by mouth daily as needed.    ), Disp: , Rfl: 0     ranitidine (ZANTAC) 150 MG tablet, TAKE 1 TABLET(150 MG) BY MOUTH TWICE DAILY, Disp: 180 tablet, Rfl: 3     sucralfate (CARAFATE) 1 gram tablet, TAKE 1 TABLET BY MOUTH FOUR TIMES DAILY(CRUSH TABLET AND MIX IT WITH A LITTLE WATER, THEN SWALLOW), Disp: 120 tablet, Rfl: 12     SYMBICORT 160-4.5 mcg/actuation inhaler, INHALE 2 PUFFS BY MOUTH TWICE DAILY, Disp: 1 Inhaler, Rfl: 2     warfarin (COUMADIN/JANTOVEN) 5 MG tablet, Take 2.5 to 5mg (1/2 or 1 tabs) by mouth daily as directed.  Adjust dose based on INR. Take 2.5 mg Monday and 5 mg the rest of the week, to be started in 2 day, hold if dark stool., Disp: , Rfl:      polymyxin B-trimethoprim (FOR POLYTRIM) 10,000 unit- 1 mg/mL Drop ophthalmic drops, Administer 1 drop to both eyes every 4 (four) hours for 7 days., Disp: 10 mL, Rfl: 0    Current Facility-Administered Medications:      lidocaine 1%-EPINEPHrine 1:100,000 1 %-1:100,000 injection 1 mL (XYLOCAINE W/EPI), 1 mL, Other, Once, UlstCammy Mckeon MD    Allergies:  Allergies   Allergen Reactions     Celebrex [Celecoxib] Rash     patient had butterfly rash - \"lupus-like\"       Latex Rash       ROS:  Pertinent positives as noted " "in HPI; otherwise 12 point ROS negative.      Physical Exam:  EXAM:  /60 (Patient Site: Right Arm, Patient Position: Sitting, Cuff Size: Adult Regular)   Pulse 76   Resp 16   Ht 5' 5\" (1.651 m)   Wt 169 lb (76.7 kg)   Breastfeeding? No   BMI 28.12 kg/m     Gen:  NAD, appears well, well-hydrated  HEENT:  TMs nl, oropharynx benign, nasal mucosa nl, conjunctiva injected with some drainage from eyes/crusting in eyelashes  Neck:  Supple, no adenopathy, no thyromegaly, no carotid bruits, no JVD  Lungs:  Clear to auscultation bilaterally  Cor:  RRR no murmur  Abd:  Soft, nontender, BS+, no masses, no guarding or rebound, no HSM  Back:  Trigger point tenderness aaliyah right upper back/suprascapular/shoulder area  Extr:  Neg.  Neuro:  No asymmetry, Nl motor tone/strength, nl sensation, reflexes =, gait nl, nl coordination, CN intact,   Skin:  Warm/dry      Procedure Note - Trigger Point Injections    Consent obtained: verbal    Indication:  Fibromyalgia    5 trigger points identified.  Each trigger point swabbed x 3 with Betadine swab.  Each trigger point then injected with 2 ml of 1% Lidocaine (no epi).    Total volume injected:  10 ml    Complications:  None, patient tolerated procedure well.    Plan:  Discussed care with patient.  RTC for further injections in 30 days prn.          Results:  Results for orders placed or performed in visit on 09/17/19   Drugs of Abuse 1,Urine   Result Value Ref Range    Amphetamines Screen Negative Screen Negative    Benzodiazepines Screen Negative Screen Negative    Opiates Screen Negative Screen Negative    Phencyclidine Screen Negative Screen Negative    THC (!) Screen Negative     Screen Positive (Confirmation available on request)    Barbiturates Screen Negative Screen Negative    Cocaine Metabolite Screen Negative Screen Negative    Oxycodone Screen Negative Screen Negative    Creatinine, Urine 25.8 mg/dL   INR   Result Value Ref Range    INR 2.70 (H) 0.90 - 1.10 "

## 2021-06-02 NOTE — TELEPHONE ENCOUNTER
Patient Returning Call  Reason for call:  lmtcb   Information relayed to patient:  Relayed msg and patient agrees. Patient states she has an upcoming apt on Wed 10/09/19. And will follow up at that time. If symptoms persist patient will follow up sooner.  Patient has additional questions:  No   If YES, what are your questions/concerns:  Na  Okay to leave a detailed message?: Yes

## 2021-06-02 NOTE — TELEPHONE ENCOUNTER
ANTICOAGULATION  MANAGEMENT    Assessment     Today's INR result of 2.7 is Therapeutic (goal INR of 2.0-3.0)        Warfarin taken as previously instructed    No new diet changes affecting INR    No new medication/supplements affecting INR    Continues to tolerate warfarin with no reported s/s of bleeding or thromboembolism     Previous INR was Therapeutic    Plan:     Left a detailed message for aMry Kay regarding INR result and instructed:     Warfarin Dosing Instructions:  Continue current warfarin dose 5 mg daily.  (0 % change)    Instructed patient to follow up no later than: one month.    Education provided:     Instructed to call the [facility] for any changes, questions or concerns. (#[phone])   ?   Mena Dumont RN    Subjective/Objective:      Mary Kay Tejada, a 69 y.o. female is on warfarin.     Mary Kay reports:     Home warfarin dose: as updated on anticoagulation calendar per template     Missed doses: No     Medication changes:  No     S/S of bleeding or thromboembolism:  No     New Injury or illness:  No     Changes in diet or alcohol consumption:  No     Upcoming surgery, procedure or cardioversion:  No    Anticoagulation Episode Summary     Current INR goal:   2.0-3.0   TTR:   64.3 %   Next INR check:   11/12/2019   INR from last check:   2.70 (10/15/2019)   Weekly max warfarin dose:      Target end date:   Indefinite   INR check location:      Preferred lab:      Send INR reminders to:   [facility]    Indications    Paroxysmal Atrial Fibrillation [I48.91]           Comments:            Anticoagulation Care Providers     Provider Role Specialty Phone number    Cammy Bui MD Referring Family Medicine [phone]

## 2021-06-02 NOTE — PROGRESS NOTES
[facility] Care Coordination Contact    member received first order for pull ups in September and did not work for her. She requested for some samples and has not received it. CC contacted [facility] and staff will put in order for samples to be delivered to member. member should receive it by next week per Aisha.    Samantha Alexandra RN, PHN   [facility]  [phone]

## 2021-06-02 NOTE — TELEPHONE ENCOUNTER
"Patient calling, stating she \"Needs antibiotic and ear drops, and vertigo medication.\" She has recurrent vertigo and thinks she has a right ear infection.    Currently no fever but has chills and sweats.    Warfarin patient.    Declines appointment to be seen in clinic. \"There is no way I can come in due to my vertigo\".  Has appt next Tuesday.    Pharmacy confirmed.    Sherie Stevenson RN, [facility] Nurse Triage/Med Refills RN     Reason for Disposition    Walking is very unsteady    Protocols used: EARACHE-A-AH      "

## 2021-06-02 NOTE — TELEPHONE ENCOUNTER
Refill Approved    Rx renewed per Medication Renewal Policy. Medication was last renewed on 2/18/19, last OV 9/17/19.    Floridalma Chinchilla, [facility] Triage/Med Refill 10/27/2019     Requested Prescriptions   Pending Prescriptions Disp Refills     sucralfate (CARAFATE) 1 gram tablet [Pharmacy Med Name: SUCRALFATE 1GM TABLETS] 120 tablet 0     Sig: TAKE 1 TABLET BY MOUTH FOUR TIMES DAILY(CRUSH TABLET AND MIX IT WITH A LITTLE WATER, THEN SWALLOW)       GI Medications Refill Protocol Passed - 10/27/2019  8:54 AM        Passed - PCP or prescribing provider visit in last 12 or next 3 months.     Last office visit with prescriber/PCP: 10/15/2019 Cammy Bui MD OR same dept: 10/15/2019 Cammy Bui MD OR same specialty: 10/15/2019 Cammy Bui MD  Last physical: 6/3/2019 Last MTM visit: Visit date not found   Next visit within 3 mo: Visit date not found  Next physical within 3 mo: Visit date not found  Prescriber OR PCP: Cammy Bui MD  Last diagnosis associated with med order: 1. Iron deficiency anemia due to chronic blood loss  - sucralfate (CARAFATE) 1 gram tablet [Pharmacy Med Name: SUCRALFATE 1GM TABLETS]; TAKE 1 TABLET BY MOUTH FOUR TIMES DAILY(CRUSH TABLET AND MIX IT WITH A LITTLE WATER, THEN SWALLOW)  Dispense: 120 tablet; Refill: 0    If protocol passes may refill for 12 months if within 3 months of last provider visit (or a total of 15 months).

## 2021-06-03 VITALS — WEIGHT: 167 LBS | BODY MASS INDEX: 27.79 KG/M2

## 2021-06-03 VITALS — BODY MASS INDEX: 28.46 KG/M2 | WEIGHT: 171 LBS

## 2021-06-03 VITALS — WEIGHT: 168.19 LBS | BODY MASS INDEX: 27.99 KG/M2

## 2021-06-03 VITALS — BODY MASS INDEX: 28.62 KG/M2 | WEIGHT: 172 LBS

## 2021-06-03 VITALS
HEART RATE: 80 BPM | RESPIRATION RATE: 16 BRPM | SYSTOLIC BLOOD PRESSURE: 126 MMHG | DIASTOLIC BLOOD PRESSURE: 56 MMHG | BODY MASS INDEX: 28.16 KG/M2 | WEIGHT: 169 LBS | HEIGHT: 65 IN

## 2021-06-03 VITALS
HEIGHT: 65 IN | HEART RATE: 68 BPM | SYSTOLIC BLOOD PRESSURE: 110 MMHG | DIASTOLIC BLOOD PRESSURE: 56 MMHG | BODY MASS INDEX: 27.82 KG/M2 | TEMPERATURE: 97.6 F | WEIGHT: 167 LBS | RESPIRATION RATE: 16 BRPM

## 2021-06-03 VITALS
SYSTOLIC BLOOD PRESSURE: 128 MMHG | DIASTOLIC BLOOD PRESSURE: 60 MMHG | HEIGHT: 65 IN | HEART RATE: 76 BPM | BODY MASS INDEX: 28.16 KG/M2 | WEIGHT: 169 LBS | RESPIRATION RATE: 16 BRPM

## 2021-06-03 VITALS — WEIGHT: 172 LBS | BODY MASS INDEX: 28.62 KG/M2

## 2021-06-03 VITALS — HEIGHT: 65 IN | BODY MASS INDEX: 27.99 KG/M2

## 2021-06-03 NOTE — TELEPHONE ENCOUNTER
ANTICOAGULATION  MANAGEMENT PROGRAM    Mary Kaymarlene Tejada is overdue for INR check.     Spoke with Mary Kay who declined to schedule INR at this time. If calling back please schedule as soon as possible.      Danay Mtz RN

## 2021-06-03 NOTE — PROGRESS NOTES
[facility] Care Coordination Contact    Order placed with [facility] (p: [phone]; f: [phone]) for long handled comb and pull ups (item number [id number]). Cancelled the original order of the previous pull ups.     Samantha Alexandra RN, PHN   [facility]  [phone]

## 2021-06-03 NOTE — PROGRESS NOTES
[facility] Care Coordination Contact  [facility] Home Visit Assessment     Home visit for Health Risk Assessment with Mary Kay Tejada completed on 11/7/2019    Type of residence:: Private home - stairs  Current living arrangement:: I live in a private home with family     Assessment completed with:: Patient, Care Team Member    Current Care Plan  Member currently receiving the following home care services:     Member currently receiving the following community resources: County Worker, DME, [employer], Housekeeping/Chore Agency, PCA      Medication Review  Medication reconciliation completed in Epic: YES  Medication set-up & administration: set up meds independently  Self-administers medications  Medication Risk Assessment Medication (1 or more, place referral to MTM):  Taking 1 or more high-risk medications for adults >65 years  MTM Referral Placed: No: declined    Mental/Behavioral Health   Depression Screening: See PHQ assessment flowsheet.   Mental health DX:: No      Mental Health Diagnosis: No  Mental Health Services: None: No further intervention needed at this time.    Falls Assessment:   Fallen 2 or more times in the past year?: No   Any fall with injury in the past year?: No    ADL/IADL Dependencies:   Dependent ADLs:: Bathing, Dressing, Grooming  Dependent IADLs:: Cleaning, Cooking, Laundry, Meal Preparation    [facility] Health Plan sponsored benefits: Shared information re: Silver Sneakers/gym memberships, ASA, Calcium +D.    PCA Assessment completed at visit: Yes    Elderly Waiver Eligibility: Yes - will continue on EW    Care Plan & Recommendations: member will continue to reside in current residence with formal and informal support. member has not had a vision exam within the last yr and recommended. Due to chronic pain and limited ROM in shoulder joints/arms, CC will request for a long handled comb/brush.     See [facility]CC for detailed assessment information.    Follow-Up Plan: Member informed of  future contact, plan to f/u with member with a 6 month telephone assessment.  Contact information shared with member and family, encouraged member to call with any questions or concerns at any time.    Samantha Alexandra RN, PHN   [facility]  [phone]

## 2021-06-03 NOTE — PROGRESS NOTES
[facility] Care Coordination Contact    Called member to schedule annual HRA home visit. HRA has been scheduled for 11/7/19.     Samantha Alexandra RN, PHN   [facility]  [phone]

## 2021-06-03 NOTE — PROGRESS NOTES
[facility] Care Coordination Contact    Called member to schedule annual HRA home visit. Left a message requesting a return call to schedule HRA.     Samantha Aelxandra RN, PHN   [facility]  [phone]

## 2021-06-03 NOTE — PROGRESS NOTES
ASSESSMENT/PLAN:  1. Fibromyalgia  lidocaine 10 mg/mL (1 %) injection 10 mL   2. Chronic a-fib  INR   3. Chronic pain syndrome  oxyCODONE (ROXICODONE) 10 mg immediate release tablet       This is a 70 yo female with:  1.  Fibromyalgia - here for trigger point injections - this has helped with her chronic pain - allowing her to use less opiates.  See procedure note.   2.  Chronic atrial fibrillation - on chronic warfarin therapy - has elevated INR today - discussed with patient - she had many questions about this; will have anticoagulation team contact patient   3.  Chronic pain -  Takes Oxycodone - continue to monitor drug screens - will check at next visit.      Return in about 1 month (around 12/18/2019) for trigger point injections, followup of pain syndrome.  Administrations This Visit     lidocaine 10 mg/mL (1 %) injection 10 mL     Admin Date  11/18/2019 Action  Given Dose  10 mL Route  Other Administered By  Acacia Benoit CMA                Medications Discontinued During This Encounter   Medication Reason     oxyCODONE (ROXICODONE) 10 mg immediate release tablet Reorder     There are no Patient Instructions on file for this visit.    Chief Complaint:  Chief Complaint   Patient presents with     trigger point shot       HPI:   Mary Kay Tejada is a 69 y.o. female c/o  No new symptoms  Continues to have significant muscle pain    PMH:   Patient Active Problem List    Diagnosis Date Noted     Mixed stress and urge urinary incontinence 08/16/2019     Skin lesion 07/17/2019     Chronic a-fib      Dyslipidemia      Upper GI bleed 12/13/2018     Melena 12/13/2018     Anemia due to blood loss, acute 07/18/2018     Diabetic polyneuropathy associated with type 2 diabetes mellitus (H) 07/18/2018     Chronic anemia 06/27/2018     Cataract 11/06/2017     Bunion 07/19/2017     Callus of foot 07/19/2017     Nonrheumatic aortic valve stenosis 05/23/2017     COPD (chronic obstructive pulmonary disease) (H) 05/23/2017      Chronic obstructive pulmonary disease with acute lower respiratory infection (H) 12/24/2016     Gastroenteritis 12/24/2016     Syncope 12/22/2016     Opacity of lung on imaging study 12/22/2016     Acute gastritis 12/22/2016     Osteoarthritis of both knees 08/22/2016     Osteoarthritis, hip, bilateral 06/20/2016     Primary osteoarthritis of left knee 06/20/2016     Candidal intertrigo 05/11/2016     Type 2 diabetes mellitus with hypoglycemia (H)      Aspiration pneumonia (H) 03/01/2016     Controlled substance agreement signed 11/23/2015     Cervical neck pain with evidence of disc disease 07/22/2015     Headache 07/17/2015     Hematoma of abdominal wall 07/05/2015     Skin lesion of face 05/22/2015     Tendonitis of wrist, right 04/22/2015     Menopause 04/06/2015     Flashers or floaters of right eye 02/23/2015     Tendonitis of wrist, left 01/21/2015     Trigger point of thoracic region 01/21/2015     Generalized osteoarthrosis, involving multiple sites 01/14/2015     Vertigo 12/17/2014     Rotator cuff tendonitis 12/17/2014     Abnormal Weight Loss      Osteoarthritis Of The Shoulder      Chronic Constipation      Onychomycosis Of The Toenails      Diarrhea      Ganglion Of The Left Wrist      Larynx Edema      Obesity      Medial Epicondylitis      Skin Lesion      Urinary Incontinence      Chronic Major Depression      Dry Eye Syndrome      Nicotine Dependence      Hypokalemia      Essential hypertension      Muscle Cramps In The Calf      Edema      Atrial flutter (H)      Lump In / On The Skin      Chronic pain syndrome      Paroxysmal Atrial Fibrillation      Fibromyalgia      Nausea      Abdominal Pain      Peptic Ulcer      COPD exacerbation (H)      Mixed hyperlipidemia      Chronic Reflux Esophagitis      Benign Adenomatous Polyp Of The Large Intestine      Past Medical History:   Diagnosis Date     Anemia      Aortic stenosis      Atrial fibrillation (H)      Atrial flutter (H)      Benign  neoplasm of adenomatous polyp     large intestine      Chronic constipation      Chronic pain syndrome      COPD (chronic obstructive pulmonary disease) (H)     Oxygen at night      Dependence on supplemental oxygen     Oxygen at noc, during the day as needed     Depression      Diabetes mellitus (H)      Dry eye syndrome      Fibromyalgia      Ganglion     left wrist     GERD (gastroesophageal reflux disease)      Hyperlipidemia      Hypertension      Hypokalemia      Larynx edema      Lung disease      Malignant Vulvar Neoplasm     Created by Conversion [facility] Annotation: Apr 17 2007  8:24AM - Cammy Bui:  resection per Dr. Alfonso Mane 9/06;  Replacement Utility updated for latest IMO load     Medial epicondylitis      Onychomycosis      Osteoarthritis      Otitis Externa     Created by Conversion      Peptic ulcer      Polyneuropathy      Vulvar malignant neoplasm (H)      Past Surgical History:   Procedure Laterality Date     BREAST BIOPSY Right      BREAST BIOPSY Right 01/28/2015     BREAST BIOPSY Right 1/28/2015    Procedure: RIGHT BREAST BIOPSY AFTER WIRE LOCALIZATION AT 0940;  Surgeon: Renée Soriano MD;  Location: [facility];  Service:      COLONOSCOPY N/A 6/14/2019    Procedure: COLONOSCOPY;  Surgeon: Eduardo Mora MD;  Location: [facility];  Service: Gastroenterology     ESOPHAGOGASTRODUODENOSCOPY N/A 11/6/2018    Procedure: ESOPHAGOGASTRODUODENOSCOPY;  Surgeon: Lit Fernando MD;  Location: [facility];  Service:      HYSTERECTOMY       JOINT REPLACEMENT Left     TKA     AK ABLATE HEART DYSRHYTHM FOCUS      Description: Catheter Ablation Atrial Fibrillation;  Recorded: 07/31/2012;  Comments: 7/24/12 PVI with Dr. Gardiner and nilay to all 5 pulm veins and CTI fl ablation line as well.     AK BIOPSY OF BREAST, INCISIONAL      Description: Incisional Breast Biopsy;  Recorded: 11/13/2007;  Comments: benign     AK SUPRACERV ABD HYSTERECTOMY      Description:  Supracervical Hysterectomy;  Proc Date: 01/01/1985;  Comments: some cervix left!; ovaries intact; done for bleeding     Social History     Socioeconomic History     Marital status:      Spouse name: Not on file     Number of children: Not on file     Years of education: Not on file     Highest education level: Not on file   Occupational History     Not on file   Social Needs     Financial resource strain: Not on file     Food insecurity:     Worry: Not on file     Inability: Not on file     Transportation needs:     Medical: Not on file     Non-medical: Not on file   Tobacco Use     Smoking status: Current Every Day Smoker     Packs/day: 0.50     Types: Cigarettes     Smokeless tobacco: Never Used   Substance and Sexual Activity     Alcohol use: Yes     Comment: very little     Drug use: No     Sexual activity: Not on file   Lifestyle     Physical activity:     Days per week: Not on file     Minutes per session: Not on file     Stress: Not on file   Relationships     Social connections:     Talks on phone: Not on file     Gets together: Not on file     Attends [employer] service: Not on file     Active member of club or organization: Not on file     Attends meetings of clubs or organizations: Not on file     Relationship status: Not on file     Intimate partner violence:     Fear of current or ex partner: Not on file     Emotionally abused: Not on file     Physically abused: Not on file     Forced sexual activity: Not on file   Other Topics Concern     Not on file   Social History Narrative     Not on file     Family History   Problem Relation Age of Onset     Heart failure Mother      Cancer Other         paternal HX-laryngeal      Alcoholism Sister      No Medical Problems Daughter      No Medical Problems Maternal Grandmother      No Medical Problems Maternal Grandfather      No Medical Problems Paternal Grandmother      No Medical Problems Paternal Grandfather      No Medical Problems Maternal Aunt       "No Medical Problems Paternal Aunt      Alcoholism Sister      Alcoholism Brother      Alcohol abuse Father      Cancer Paternal Uncle         Gastric-Alcohol     Cancer Paternal Uncle         gastric-Alcohol     BRCA 1/2 Neg Hx      Breast cancer Neg Hx      Colon cancer Neg Hx      Endometrial cancer Neg Hx      Ovarian cancer Neg Hx        Meds:    Current Outpatient Medications:      ACCU-CHEK SOFTCLIX LANCETS lancets, TEST THREE TIMES DAILY, Disp: 300 each, Rfl: 3     albuterol (PROAIR HFA;PROVENTIL HFA;VENTOLIN HFA) 90 mcg/actuation inhaler, INHALE 2 PUFFS EVERY 4 HOURS AS NEEDED WHEEZING, Disp: 90 g, Rfl: 11     albuterol (PROVENTIL) 2.5 mg /3 mL (0.083 %) nebulizer solution, Take 3 mL (2.5 mg total) by nebulization every 4 (four) hours as needed for wheezing or shortness of breath., Disp: 75 mL, Rfl: 12     atorvastatin (LIPITOR) 20 MG tablet, TAKE 1 TABLET(20 MG) BY MOUTH AT BEDTIME, Disp: 90 tablet, Rfl: 3     BD ULTRA-FINE SHORT PEN NEEDLE 31 gauge x 5/16\" Ndle, TEST FOUR TIMES DAILY WITH MEALS AND AT BEDTIME, Disp: 400 each, Rfl: 3     blood glucose test strips, Use 1 each As Directed 3 (three) times a day as needed (for blood glucose testing). Dispense strips that are for One Touch Verio IQ meter, Disp: 300 strip, Rfl: 3     blood-glucose meter (ONETOUCH VERIO IQ METER) Misc, Check blood sugar three times a day., Disp: 1 each, Rfl: 0     budesonide-formoterol (SYMBICORT) 160-4.5 mcg/actuation inhaler, Inhale 2 puffs 2 (two) times a day., Disp: 1 Inhaler, Rfl: 2     cyclobenzaprine (FLEXERIL) 10 MG tablet, Take 1 tablet (10 mg total) by mouth at bedtime as needed for muscle spasms., Disp: 30 tablet, Rfl: 0     diaper,brief,adult,disposable (ADULT BRIEFS - LARGE) Misc, Use 3-4 daily as needed for incontinence, Disp: 120 each, Rfl: 6     diltiazem (CARTIA XT) 120 MG 24 hr capsule, Take 1 capsule (120 mg total) by mouth daily., Disp: 90 capsule, Rfl: 3     furosemide (LASIX) 20 MG tablet, Take 1 tablet (20 " mg total) by mouth daily as needed., Disp: 90 tablet, Rfl: 3     gabapentin (NEURONTIN) 300 MG capsule, Take 300 mg by mouth 2 (two) times a day. 300 mg two times a day, 600 mg at bedtime   , Disp: , Rfl:      gabapentin (NEURONTIN) 600 MG tablet, TAKE 1 TABLET(600 MG) BY MOUTH THREE TIMES DAILY, Disp: 90 tablet, Rfl: 3     generic lancets (FINGERSTIX LANCETS), Dispense brand per patient's insurance at pharmacy discretion., Disp: 300 each, Rfl: 0     gentamicin (GARAMYCIN) 0.1 % ointment, APPLY TOPICALLY TO WOUND BID, Disp: , Rfl: 0     insulin aspart U-100 (NOVOLOG) 100 unit/mL injection, Inject under the skin 3 (three) times a day before meals. Inject per sliding scale, Disp: , Rfl:      ipratropium-albuterol (DUO-NEB) 0.5-2.5 mg/3 mL nebulizer, INAHALE 1 VIAL IN NEBULIZER EVERY 4 HOURS AS NEEDED, Disp: 1440 mL, Rfl: 0     meclizine (ANTIVERT) 25 mg tablet, TAKE 1 TABLET(25 MG) BY MOUTH THREE TIMES DAILY AS NEEDED FOR DIZZINESS OR NAUSEA, Disp: 45 tablet, Rfl: 5     metFORMIN (GLUCOPHAGE) 500 MG tablet, Take 1 tablet (500 mg total) by mouth 2 (two) times a day with meals., Disp: 180 tablet, Rfl: 3     nystatin (NYSTOP) powder, Apply 1 application topically 2 (two) times a day as needed. 2-3 times to affected area(s)., Disp: 15 g, Rfl: 3     oxyCODONE (ROXICODONE) 10 mg immediate release tablet, Take 1 tablet (10 mg total) by mouth every 6 (six) hours as needed., Disp: 120 tablet, Rfl: 0     polyethylene glycol (MIRALAX) 17 gram packet, Take 1 packet (17 g total) by mouth daily. (Patient taking differently: Take 17 g by mouth daily as needed.    ), Disp: , Rfl: 0     ranitidine (ZANTAC) 150 MG tablet, TAKE 1 TABLET(150 MG) BY MOUTH TWICE DAILY, Disp: 180 tablet, Rfl: 3     sucralfate (CARAFATE) 1 gram tablet, TAKE 1 TABLET BY MOUTH FOUR TIMES DAILY(CRUSH TABLET AND MIX IT WITH A LITTLE WATER, THEN SWALLOW), Disp: 120 tablet, Rfl: 12     sucralfate (CARAFATE) 1 gram tablet, TAKE 1 TABLET BY MOUTH FOUR TIMES  "DAILY(CRUSH TABLET AND MIX IT WITH A LITTLE WATER, THEN SWALLOW), Disp: 120 tablet, Rfl: 5     SYMBICORT 160-4.5 mcg/actuation inhaler, INHALE 2 PUFFS BY MOUTH TWICE DAILY, Disp: 1 Inhaler, Rfl: 2     warfarin (COUMADIN/JANTOVEN) 5 MG tablet, Take 2.5 to 5mg (1/2 or 1 tabs) by mouth daily as directed.  Adjust dose based on INR. Take 2.5 mg Monday and 5 mg the rest of the week, to be started in 2 day, hold if dark stool., Disp: , Rfl:      warfarin (COUMADIN/JANTOVEN) 5 MG tablet, TAKE 1/2 TO 1 TABLET BY MOUTH DAILY AS DIRECTED. ADJUST DOSE BASED ON INR, Disp: 90 tablet, Rfl: 1    Current Facility-Administered Medications:      lidocaine 1%-EPINEPHrine 1:100,000 1 %-1:100,000 injection 1 mL (XYLOCAINE W/EPI), 1 mL, Other, Once, Cammy Bui MD    Allergies:  Allergies   Allergen Reactions     Celebrex [Celecoxib] Rash     patient had butterfly rash - \"lupus-like\"       Latex Rash       ROS:  Pertinent positives as noted in HPI; otherwise 12 point ROS negative.      Physical Exam:  EXAM:  /54 (Patient Site: Right Arm, Patient Position: Sitting, Cuff Size: Adult Regular)   Pulse 88   Temp 98.2  F (36.8  C) (Oral)   Resp 14   Ht 5' 4\" (1.626 m)   Wt 164 lb 12.8 oz (74.8 kg)   BMI 28.29 kg/m     Gen:  NAD, appears well, well-hydrated  HEENT:  TMs nl, oropharynx benign, nasal mucosa nl, conjunctiva clear  Neck:  Supple, no adenopathy, no thyromegaly, no carotid bruits, no JVD  Lungs:  Clear to auscultation bilaterally  Cor:  RRR no murmur  Abd:  Soft, nontender, BS+, no masses, no guarding or rebound, no HSM  Extr:  Neg.  Back:  Multiple trigger points with tenderness  Neuro:  No asymmetry  Skin:  Warm/dry        Results:  Results for orders placed or performed in visit on 11/18/19   INR   Result Value Ref Range    INR 4.90 (H) 0.90 - 1.10       Procedure Note - Trigger Point Injections    Consent obtained: verbal    Indication:  Fibromyalgia, trigger point pain    5 trigger points identified.  " Each trigger point swabbed x 3 with Betadine swab.  Each trigger point then injected with 2 ml of 1% Lidocaine (no epi).    Total volume injected:  10 ml    Complications:  None, patient tolerated procedure well.    Plan:  Discussed care with patient.  RTC for further injections in 30 days prn.

## 2021-06-03 NOTE — TELEPHONE ENCOUNTER
Refill Approved    Rx renewed per Medication Renewal Policy. Medication was last renewed on 3/15/19.    Mariam Ambrocio, [facility] Triage/Med Refill 11/5/2019     Requested Prescriptions   Pending Prescriptions Disp Refills     gabapentin (NEURONTIN) 600 MG tablet [Pharmacy Med Name: GABAPENTIN 600MG TABLETS] 90 tablet 0     Sig: TAKE 1 TABLET(600 MG) BY MOUTH THREE TIMES DAILY       Gabapentin/Levetiracetam/Tiagabine Refill Protocol  Passed - 11/5/2019  5:15 PM        Passed - PCP or prescribing provider visit in past 12 months or next 3 months     Last office visit with prescriber/PCP: 10/15/2019 Cammy Bui MD OR same dept: 10/15/2019 Cammy Bui MD OR same specialty: 10/15/2019 Cammy Bui MD  Last physical: 6/3/2019 Last MTM visit: Visit date not found   Next visit within 3 mo: Visit date not found  Next physical within 3 mo: Visit date not found  Prescriber OR PCP: Cammy Bui MD  Last diagnosis associated with med order: 1. Type 2 diabetes mellitus with hypoglycemia without coma, with long-term current use of insulin (H)  - gabapentin (NEURONTIN) 600 MG tablet [Pharmacy Med Name: GABAPENTIN 600MG TABLETS]; TAKE 1 TABLET(600 MG) BY MOUTH THREE TIMES DAILY  Dispense: 90 tablet; Refill: 0    If protocol passes may refill for 12 months if within 3 months of last provider visit (or a total of 15 months).

## 2021-06-03 NOTE — PROGRESS NOTES
[facility] Care Coordination Contact    [employer]:  Faxed completed PCA assessment to [facility] and mailed copies to member.  Faxed MD Communication to PCP.  Emailed referral form for auth to [employer].    Prashant Vargas  Care Management Specialist  [facility]  [phone]

## 2021-06-03 NOTE — TELEPHONE ENCOUNTER
ANTICOAGULATION  MANAGEMENT    Assessment     Today's INR result of 4.9 is Supratherapeutic (goal INR of 2.0-3.0)        Warfarin taken as previously instructed    Decreased greens/vitamin K intake may be affecting INR    No new medication/supplements affecting INR    Continues to tolerate warfarin with no reported s/s of bleeding or thromboembolism     Previous INR was Therapeutic    Plan:     Spoke with Mary Kay regarding INR result and instructed:     Warfarin Dosing Instructions:  Hold dose today then change warfarin dose to 2.5 mg daily on Tuesdays; and 5 mg daily rest of week  (7.1 % change)    Instructed patient to follow up no later than: 7-10 days    Education provided: importance of consistent vitamin K intake, impact of vitamin K foods on INR, importance of therapeutic range, target INR goal and significance of current INR result, importance of following up for INR monitoring at instructed interval, importance of taking warfarin as instructed, monitoring for bleeding signs and symptoms and when to seek medical attention/emergency care    Mary Kay verbalizes understanding and agrees to warfarin dosing plan.    Instructed to call the [facility] for any changes, questions or concerns. (#[phone])   ?   Mena Dumont RN    Subjective/Objective:      Mary Kaymarlene Tejada, a 69 y.o. female is on warfarin.     Mary Kay reports:     Home warfarin dose: verbally confirmed home dose with Mary Kay and updated on anticoagulation calendar     Missed doses: No     Medication changes:  No     S/S of bleeding or thromboembolism:  No     New Injury or illness:  No     Changes in diet or alcohol consumption:  Yes: she ate less greens. This will stay this way for the winter. She will eat a salad with her high INR today.     Upcoming surgery, procedure or cardioversion:  No    Anticoagulation Episode Summary     Current INR goal:   2.0-3.0   TTR:   56.3 %   Next INR check:   11/28/2019   INR from last check:    4.90! (11/18/2019)   Weekly max warfarin dose:      Target end date:   Indefinite   INR check location:      Preferred lab:      Send INR reminders to:   [facility]    Indications    Paroxysmal Atrial Fibrillation [I48.91]           Comments:            Anticoagulation Care Providers     Provider Role Specialty Phone number    Cammy Bui MD Referring Family Medicine [phone]

## 2021-06-03 NOTE — TELEPHONE ENCOUNTER
Anticoagulation Annual Referral Renewal Review    Mary Kay Tejada's chart reviewed for annual renewal of referral to anticoagulation monitoring.        Criteria for anticoagulation nurse and/or pharmacist renewal met   Warfarin indication: Atrial Fibrillation and Atrial Flutter Yes, per indication   Current with INR monitoring/compliant Yes Yes   Date of last office visit 10/15/19 Yes, had office visit within last year   Time in Therapeutic Range (TTR) 69 % Yes, TTR > 60%       Mary Kay Tejada met all criteria for anticoagulation management program initiated renewal.  New INR standing orders and anticoagulation referral renewal placed.      Marija Crawley RN  2:27 PM

## 2021-06-04 VITALS
RESPIRATION RATE: 14 BRPM | BODY MASS INDEX: 28.49 KG/M2 | DIASTOLIC BLOOD PRESSURE: 75 MMHG | SYSTOLIC BLOOD PRESSURE: 146 MMHG | HEART RATE: 81 BPM | TEMPERATURE: 97.5 F | WEIGHT: 166 LBS

## 2021-06-04 VITALS
RESPIRATION RATE: 16 BRPM | SYSTOLIC BLOOD PRESSURE: 115 MMHG | HEART RATE: 69 BPM | WEIGHT: 166 LBS | BODY MASS INDEX: 28.49 KG/M2 | DIASTOLIC BLOOD PRESSURE: 67 MMHG

## 2021-06-04 VITALS — RESPIRATION RATE: 20 BRPM | DIASTOLIC BLOOD PRESSURE: 68 MMHG | HEART RATE: 76 BPM | SYSTOLIC BLOOD PRESSURE: 112 MMHG

## 2021-06-04 VITALS
HEART RATE: 67 BPM | SYSTOLIC BLOOD PRESSURE: 136 MMHG | TEMPERATURE: 98.5 F | WEIGHT: 161 LBS | RESPIRATION RATE: 18 BRPM | DIASTOLIC BLOOD PRESSURE: 59 MMHG | BODY MASS INDEX: 27.64 KG/M2

## 2021-06-04 VITALS — RESPIRATION RATE: 16 BRPM | DIASTOLIC BLOOD PRESSURE: 62 MMHG | SYSTOLIC BLOOD PRESSURE: 131 MMHG | HEART RATE: 79 BPM

## 2021-06-04 VITALS
SYSTOLIC BLOOD PRESSURE: 135 MMHG | BODY MASS INDEX: 28.49 KG/M2 | RESPIRATION RATE: 21 BRPM | HEART RATE: 103 BPM | DIASTOLIC BLOOD PRESSURE: 72 MMHG | WEIGHT: 166 LBS

## 2021-06-04 VITALS
HEART RATE: 85 BPM | WEIGHT: 165 LBS | SYSTOLIC BLOOD PRESSURE: 130 MMHG | BODY MASS INDEX: 28.32 KG/M2 | RESPIRATION RATE: 16 BRPM | DIASTOLIC BLOOD PRESSURE: 69 MMHG

## 2021-06-04 VITALS
TEMPERATURE: 98.2 F | DIASTOLIC BLOOD PRESSURE: 54 MMHG | BODY MASS INDEX: 28.13 KG/M2 | HEART RATE: 88 BPM | SYSTOLIC BLOOD PRESSURE: 100 MMHG | HEIGHT: 64 IN | RESPIRATION RATE: 14 BRPM | WEIGHT: 164.8 LBS

## 2021-06-04 VITALS
SYSTOLIC BLOOD PRESSURE: 128 MMHG | RESPIRATION RATE: 14 BRPM | BODY MASS INDEX: 27.98 KG/M2 | HEART RATE: 73 BPM | DIASTOLIC BLOOD PRESSURE: 68 MMHG | WEIGHT: 163 LBS | TEMPERATURE: 98.7 F

## 2021-06-04 VITALS
BODY MASS INDEX: 26.12 KG/M2 | SYSTOLIC BLOOD PRESSURE: 107 MMHG | RESPIRATION RATE: 18 BRPM | HEART RATE: 76 BPM | HEIGHT: 64 IN | WEIGHT: 153 LBS | DIASTOLIC BLOOD PRESSURE: 70 MMHG | TEMPERATURE: 99.2 F

## 2021-06-04 NOTE — TELEPHONE ENCOUNTER
Relayed message to patient.    FYI: She verbalized understanding but would like to wait until tomorrow for Dr. Brizuela's orders.

## 2021-06-04 NOTE — TELEPHONE ENCOUNTER
codeine-guaiFENesin (GUAIFENESIN AC)  mg/5 mL liquid  Class was set as printed. Unable to reque medication. Please call in at 9:00am when the pharmacy opens.

## 2021-06-04 NOTE — TELEPHONE ENCOUNTER
ANTICOAGULATION  MANAGEMENT PROGRAM    Mary Kaymarlene Tejada is overdue for INR check.     Left message to call and schedule INR appointment as soon as possible.      Danay Mtz RN

## 2021-06-04 NOTE — PROGRESS NOTES
[facility] Care Coordination Contact    Received call from member that she received the incorrect pull ups and has not received comfort bath wipes.    Called [facility] and spoke with Nalini. She indicated that the incorrect pull ups were sent out and she will make correction to the order. [facility] does not have order on file for the bath wipes and needs auth. She was informed that [employer] does not need an auth for items less than $30/month which the bath wipes are. Requested for [facility] to send them out to member.    Samantha Alexandra RN, PHN   [facility]  [phone]

## 2021-06-04 NOTE — TELEPHONE ENCOUNTER
Called and spoke with Mary Kay Tejada , Message was given, Mary Kay Tejada  understood, no further questions.

## 2021-06-04 NOTE — PROGRESS NOTES
ASSESSMENT/PLAN:  1. Fibromyalgia  lidocaine 10 mg/mL (1 %) injection 10 mL   2. Chronic a-fib  INR   3. Chronic pain syndrome  oxyCODONE (ROXICODONE) 10 mg immediate release tablet   4. Trigger point of thoracic region  lidocaine 10 mg/mL (1 %) injection 10 mL   5. Bunion, left         This is a 70 yo female with:  1.  Fibromyalgia/trigger point pain of thoracic region - patient gets monthly prn injections for pain syndrome - this eliminates increasing pain medications - see procedure note  2.  Chronic pain syndrome  - stable on Oxycodone -   3.  Chronic a fib - patient on chronic warfarin therapy - check INR - managed by anticoagulation team.   4.  Bunion left foot - painful - discussed desire for surgical referral - after discussion, patient would prefer to wait  Return in about 1 month (around 1/16/2020) for Recheck.      Medications Discontinued During This Encounter   Medication Reason     predniSONE (DELTASONE) 10 mg tablet Therapy completed     oxyCODONE (ROXICODONE) 10 mg immediate release tablet Reorder     sucralfate (CARAFATE) 1 gram tablet Duplicate order     warfarin (COUMADIN/JANTOVEN) 5 MG tablet Duplicate order     There are no Patient Instructions on file for this visit.    Chief Complaint:  Chief Complaint   Patient presents with     tigger point injection     right shoulder.     referral      for left foot       HPI:   Mary Kay Tejada is a 69 y.o. female c/o  Patient is having pain - upper back/neck  Taking medication -   Has not gotten worse at this point -   Does well with trigger point injections - will inject today    Patient has left foot pain associated with a large bunion - thought she wanted a referral, we discussed    PMH:   Patient Active Problem List    Diagnosis Date Noted     Mixed stress and urge urinary incontinence 08/16/2019     Skin lesion 07/17/2019     Chronic a-fib      Dyslipidemia      Upper GI bleed 12/13/2018     Melena 12/13/2018     Anemia due to blood loss, acute  07/18/2018     Diabetic polyneuropathy associated with type 2 diabetes mellitus (H) 07/18/2018     Chronic anemia 06/27/2018     Cataract 11/06/2017     Bunion, left 07/19/2017     Callus of foot 07/19/2017     Nonrheumatic aortic valve stenosis 05/23/2017     COPD (chronic obstructive pulmonary disease) (H) 05/23/2017     Chronic obstructive pulmonary disease with acute lower respiratory infection (H) 12/24/2016     Gastroenteritis 12/24/2016     Syncope 12/22/2016     Opacity of lung on imaging study 12/22/2016     Acute gastritis 12/22/2016     Osteoarthritis of both knees 08/22/2016     Osteoarthritis, hip, bilateral 06/20/2016     Primary osteoarthritis of left knee 06/20/2016     Candidal intertrigo 05/11/2016     Type 2 diabetes mellitus with hypoglycemia (H)      Aspiration pneumonia (H) 03/01/2016     Controlled substance agreement signed 11/23/2015     Cervical neck pain with evidence of disc disease 07/22/2015     Headache 07/17/2015     Hematoma of abdominal wall 07/05/2015     Skin lesion of face 05/22/2015     Tendonitis of wrist, right 04/22/2015     Menopause 04/06/2015     Flashers or floaters of right eye 02/23/2015     Tendonitis of wrist, left 01/21/2015     Trigger point of thoracic region 01/21/2015     Generalized osteoarthrosis, involving multiple sites 01/14/2015     Vertigo 12/17/2014     Rotator cuff tendonitis 12/17/2014     Abnormal Weight Loss      Osteoarthritis Of The Shoulder      Chronic Constipation      Onychomycosis Of The Toenails      Diarrhea      Ganglion Of The Left Wrist      Larynx Edema      Obesity      Medial Epicondylitis      Skin Lesion      Urinary Incontinence      Chronic Major Depression      Dry Eye Syndrome      Nicotine Dependence      Hypokalemia      Essential hypertension      Muscle Cramps In The Calf      Edema      Atrial flutter (H)      Lump In / On The Skin      Chronic pain syndrome      Paroxysmal Atrial Fibrillation      Fibromyalgia      Nausea       Abdominal Pain      Peptic Ulcer      COPD exacerbation (H)      Mixed hyperlipidemia      Chronic Reflux Esophagitis      Benign Adenomatous Polyp Of The Large Intestine      Past Medical History:   Diagnosis Date     Anemia      Aortic stenosis      Atrial fibrillation (H)      Atrial flutter (H)      Benign neoplasm of adenomatous polyp     large intestine      Chronic constipation      Chronic pain syndrome      COPD (chronic obstructive pulmonary disease) (H)     Oxygen at night      Dependence on supplemental oxygen     Oxygen at noc, during the day as needed     Depression      Diabetes mellitus (H)      Dry eye syndrome      Fibromyalgia      Ganglion     left wrist     GERD (gastroesophageal reflux disease)      Hyperlipidemia      Hypertension      Hypokalemia      Larynx edema      Lung disease      Malignant Vulvar Neoplasm     Created by Conversion [facility] Annotation: Apr 17 2007  8:24AM - Cammy Bui:  resection per Dr. Alfonso Mane 9/06;  Replacement Utility updated for latest IMO load     Medial epicondylitis      Onychomycosis      Osteoarthritis      Otitis Externa     Created by Conversion      Peptic ulcer      Polyneuropathy      Vulvar malignant neoplasm (H)      Past Surgical History:   Procedure Laterality Date     BREAST BIOPSY Right      BREAST BIOPSY Right 01/28/2015     BREAST BIOPSY Right 1/28/2015    Procedure: RIGHT BREAST BIOPSY AFTER WIRE LOCALIZATION AT 0940;  Surgeon: Renée Soriano MD;  Location: [facility] [place];  Service:      COLONOSCOPY N/A 6/14/2019    Procedure: COLONOSCOPY;  Surgeon: Eduardo Mora MD;  Location: [facility] OR;  Service: Gastroenterology     ESOPHAGOGASTRODUODENOSCOPY N/A 11/6/2018    Procedure: ESOPHAGOGASTRODUODENOSCOPY;  Surgeon: Lit Fernando MD;  Location: [facility] [place];  Service:      HYSTERECTOMY       JOINT REPLACEMENT Left     TKA     MI ABLATE HEART DYSRHYTHM FOCUS      Description: Catheter Ablation  Atrial Fibrillation;  Recorded: 07/31/2012;  Comments: 7/24/12 PVI with Dr. Gardiner and nilya to all 5 pulm veins and CTI fl ablation line as well.     KS BIOPSY OF BREAST, INCISIONAL      Description: Incisional Breast Biopsy;  Recorded: 11/13/2007;  Comments: benign     KS SUPRACERV ABD HYSTERECTOMY      Description: Supracervical Hysterectomy;  Proc Date: 01/01/1985;  Comments: some cervix left!; ovaries intact; done for bleeding     Social History     Socioeconomic History     Marital status:      Spouse name: Not on file     Number of children: Not on file     Years of education: Not on file     Highest education level: Not on file   Occupational History     Not on file   Social Needs     Financial resource strain: Not on file     Food insecurity:     Worry: Not on file     Inability: Not on file     Transportation needs:     Medical: Not on file     Non-medical: Not on file   Tobacco Use     Smoking status: Current Every Day Smoker     Packs/day: 0.50     Types: Cigarettes     Smokeless tobacco: Never Used   Substance and Sexual Activity     Alcohol use: Yes     Comment: very little     Drug use: No     Sexual activity: Not on file   Lifestyle     Physical activity:     Days per week: Not on file     Minutes per session: Not on file     Stress: Not on file   Relationships     Social connections:     Talks on phone: Not on file     Gets together: Not on file     Attends Judaism service: Not on file     Active member of club or organization: Not on file     Attends meetings of clubs or organizations: Not on file     Relationship status: Not on file     Intimate partner violence:     Fear of current or ex partner: Not on file     Emotionally abused: Not on file     Physically abused: Not on file     Forced sexual activity: Not on file   Other Topics Concern     Not on file   Social History Narrative     Not on file     Family History   Problem Relation Age of Onset     Heart failure Mother      Cancer  "Other         paternal HX-laryngeal      Alcoholism Sister      No Medical Problems Daughter      No Medical Problems Maternal Grandmother      No Medical Problems Maternal Grandfather      No Medical Problems Paternal Grandmother      No Medical Problems Paternal Grandfather      No Medical Problems Maternal Aunt      No Medical Problems Paternal Aunt      Alcoholism Sister      Alcoholism Brother      Alcohol abuse Father      Cancer Paternal Uncle         Gastric-Alcohol     Cancer Paternal Uncle         gastric-Alcohol     BRCA 1/2 Neg Hx      Breast cancer Neg Hx      Colon cancer Neg Hx      Endometrial cancer Neg Hx      Ovarian cancer Neg Hx        Meds:    Current Outpatient Medications:      ACCU-CHEK SOFTCLIX LANCETS lancets, TEST THREE TIMES DAILY, Disp: 300 each, Rfl: 3     albuterol (PROAIR HFA;PROVENTIL HFA;VENTOLIN HFA) 90 mcg/actuation inhaler, INHALE 2 PUFFS EVERY 4 HOURS AS NEEDED WHEEZING, Disp: 90 g, Rfl: 11     albuterol (PROVENTIL) 2.5 mg /3 mL (0.083 %) nebulizer solution, Take 3 mL (2.5 mg total) by nebulization every 4 (four) hours as needed for wheezing or shortness of breath., Disp: 75 mL, Rfl: 12     atorvastatin (LIPITOR) 20 MG tablet, TAKE 1 TABLET(20 MG) BY MOUTH AT BEDTIME, Disp: 90 tablet, Rfl: 3     BD ULTRA-FINE SHORT PEN NEEDLE 31 gauge x 5/16\" Ndle, TEST FOUR TIMES DAILY WITH MEALS AND AT BEDTIME, Disp: 400 each, Rfl: 3     blood glucose test strips, Use 1 each As Directed 3 (three) times a day as needed (for blood glucose testing). Dispense strips that are for One Touch Verio IQ meter, Disp: 300 strip, Rfl: 3     blood-glucose meter (ONETOUCH VERIO IQ METER) Misc, Check blood sugar three times a day., Disp: 1 each, Rfl: 0     budesonide-formoterol (SYMBICORT) 160-4.5 mcg/actuation inhaler, Inhale 2 puffs 2 (two) times a day., Disp: 1 Inhaler, Rfl: 2     codeine-guaiFENesin (GUAIFENESIN AC)  mg/5 mL liquid, Take 5 mL by mouth 3 (three) times a day as needed for cough., " Disp: 240 mL, Rfl: 0     cyclobenzaprine (FLEXERIL) 10 MG tablet, Take 1 tablet (10 mg total) by mouth at bedtime as needed for muscle spasms., Disp: 30 tablet, Rfl: 0     diaper,brief,adult,disposable (ADULT BRIEFS - LARGE) Misc, Use 3-4 daily as needed for incontinence, Disp: 120 each, Rfl: 6     diltiazem (CARTIA XT) 120 MG 24 hr capsule, Take 1 capsule (120 mg total) by mouth daily., Disp: 90 capsule, Rfl: 3     furosemide (LASIX) 20 MG tablet, Take 1 tablet (20 mg total) by mouth daily as needed., Disp: 90 tablet, Rfl: 3     gabapentin (NEURONTIN) 300 MG capsule, Take 300 mg by mouth 2 (two) times a day. 300 mg two times a day, 600 mg at bedtime   , Disp: , Rfl:      gabapentin (NEURONTIN) 600 MG tablet, TAKE 1 TABLET(600 MG) BY MOUTH THREE TIMES DAILY, Disp: 90 tablet, Rfl: 3     generic lancets (FINGERSTIX LANCETS), Dispense brand per patient's insurance at pharmacy discretion., Disp: 300 each, Rfl: 0     gentamicin (GARAMYCIN) 0.1 % ointment, APPLY TOPICALLY TO WOUND BID, Disp: , Rfl: 0     insulin aspart U-100 (NOVOLOG) 100 unit/mL injection, Inject under the skin 3 (three) times a day before meals. Inject per sliding scale, Disp: , Rfl:      ipratropium-albuterol (DUO-NEB) 0.5-2.5 mg/3 mL nebulizer, INAHALE 1 VIAL IN NEBULIZER EVERY 4 HOURS AS NEEDED, Disp: 1440 mL, Rfl: 0     meclizine (ANTIVERT) 25 mg tablet, TAKE 1 TABLET(25 MG) BY MOUTH THREE TIMES DAILY AS NEEDED FOR DIZZINESS OR NAUSEA, Disp: 45 tablet, Rfl: 5     metFORMIN (GLUCOPHAGE) 500 MG tablet, Take 1 tablet (500 mg total) by mouth 2 (two) times a day with meals., Disp: 180 tablet, Rfl: 3     nystatin (NYSTOP) powder, Apply 1 application topically 2 (two) times a day as needed. 2-3 times to affected area(s)., Disp: 15 g, Rfl: 3     polyethylene glycol (MIRALAX) 17 gram packet, Take 1 packet (17 g total) by mouth daily. (Patient taking differently: Take 17 g by mouth daily as needed.    ), Disp: , Rfl: 0     ranitidine (ZANTAC) 150 MG tablet,  "TAKE 1 TABLET(150 MG) BY MOUTH TWICE DAILY, Disp: 180 tablet, Rfl: 3     sucralfate (CARAFATE) 1 gram tablet, TAKE 1 TABLET BY MOUTH FOUR TIMES DAILY(CRUSH TABLET AND MIX IT WITH A LITTLE WATER, THEN SWALLOW), Disp: 120 tablet, Rfl: 12     SYMBICORT 160-4.5 mcg/actuation inhaler, INHALE 2 PUFFS BY MOUTH TWICE DAILY, Disp: 1 Inhaler, Rfl: 2     warfarin (COUMADIN/JANTOVEN) 5 MG tablet, Take 2.5 to 5mg (1/2 or 1 tabs) by mouth daily as directed.  Adjust dose based on INR. Take 2.5 mg Monday and 5 mg the rest of the week, to be started in 2 day, hold if dark stool., Disp: , Rfl:      oxyCODONE (ROXICODONE) 10 mg immediate release tablet, Take 1 tablet (10 mg total) by mouth every 6 (six) hours as needed., Disp: 120 tablet, Rfl: 0    Current Facility-Administered Medications:      lidocaine 1%-EPINEPHrine 1:100,000 1 %-1:100,000 injection 1 mL (XYLOCAINE W/EPI), 1 mL, Other, Once, Cammy Bui MD     lidocaine 10 mg/mL (1 %) injection 10 mL, 10 mL, Other, Once, Cammy Bui MD    Allergies:  Allergies   Allergen Reactions     Celebrex [Celecoxib] Rash     patient had butterfly rash - \"lupus-like\"       Latex Rash       ROS:  Pertinent positives as noted in HPI; otherwise 12 point ROS negative.      Physical Exam:  EXAM:  /68 (Patient Site: Left Arm, Patient Position: Sitting, Cuff Size: Adult Regular)   Pulse 76   Resp 20    Gen:  NAD, appears well, well-hydrated  HEENT:  TMs nl, oropharynx benign, nasal mucosa nl, conjunctiva clear  Neck:  Supple, no adenopathy, no thyromegaly, no carotid bruits, no JVD  Lungs:  Clear to auscultation bilaterally  Cor:  RRR no murmur  Abd:  Soft, nontender, BS+, no masses, no guarding or rebound, no HSM  Extr:  Neg., trigger point tenderness at suprascapular region R>L and paracervical/paraupperthoracic muscles; large bunion deformity at left foot  Neuro:  No asymmetry  Skin:  Warm/dry        Results:  Results for orders placed or performed in " visit on 12/16/19   INR   Result Value Ref Range    INR 4.30 (H) 0.90 - 1.10       Procedure Note - Trigger Point Injections    Consent obtained: verbal    Indication:  Fibromyalgia/chronic pain/trigger point pain    5 trigger points identified.  Each trigger point swabbed x 3 with Betadine swab.  Each trigger point then injected with 2 ml of 1% Lidocaine (no epi).    Total volume injected:  10 ml    Complications:  None, patient tolerated procedure well.    Plan:  Discussed care with patient.  RTC for further injections in 30 days prn.

## 2021-06-04 NOTE — TELEPHONE ENCOUNTER
Patient calling. She is reporting that she has the beginning of a URI, yesterday.  Afebrile, but states she does have chills.  She reports, chest cold starting, and would like 3 prescriptions;  1.) Prednisone , Titrating dose(she reports having it in the past)  2.) Antibiotic  3.)Cough syrup  She reports, chest cold with green phlegm, History of COPD, still smoking.    She declined appointment , states she may get more ill if she goes out in the cold weather.  She states , Dr. Brizuela, always sends these three RX's for her when she gets these symptoms.  Pharmacy listed.  Please advise. Patient wanting a callback from the clinic.  Elin Vee RN  Care Connection Triage/refill nurse      Reason for Disposition    Colds with no complications    Protocols used: COMMON COLD-A-OH

## 2021-06-04 NOTE — TELEPHONE ENCOUNTER
ANTICOAGULATION  MANAGEMENT    Assessment     Today's INR result of 4.3 is Supratherapeutic (goal INR of 2.0-3.0)        Warfarin taken as previously instructed    No new diet changes affecting INR    Interaction between Amoxicillin and Prednisone and warfarin may be affecting INR    Continues to tolerate warfarin with no reported s/s of bleeding or thromboembolism     Previous INR was Supratherapeutic    Plan:     Spoke with Mary Kay regarding INR result and instructed:     Warfarin Dosing Instructions:  hold warfarin x 2 days then continue current warfarin dose 2.5 mg daily on Tues; and 5 mg daily rest of week  (0 % change)    Instructed patient to follow up no later than: one week    Education provided: importance of therapeutic range, target INR goal and significance of current INR result, importance of following up for INR monitoring at instructed interval and potential interaction between warfarin and Amoxicillin and prednisone    Mary Kay verbalizes understanding and agrees to warfarin dosing plan.    Instructed to call the [facility] for any changes, questions or concerns. (#[phone])   ?   Danay Mtz RN    Subjective/Objective:      Mary Kaydilma Tejada, a 69 y.o. female is on warfarin.     Mary Kay reports:     Home warfarin dose: as updated on anticoagulation calendar per template     Missed doses: No     Medication changes:  Yes: Amoxicillin (started 12/6 and has a few days left) and prednisone (12/6-12/15)     S/S of bleeding or thromboembolism:  No     New Injury or illness:  Yes: URI     Changes in diet or alcohol consumption:  No     Upcoming surgery, procedure or cardioversion:  No    Anticoagulation Episode Summary     Current INR goal:   2.0-3.0   TTR:   51.7 % (1 y)   Next INR check:   12/23/2019   INR from last check:   4.30! (12/16/2019)   Weekly max warfarin dose:      Target end date:   Indefinite   INR check location:      Preferred lab:      Send INR reminders to:   [facility]  [address]    Indications    Paroxysmal Atrial Fibrillation [I48.91]           Comments:            Anticoagulation Care Providers     Provider Role Specialty Phone number    Cammy Bui MD [facility] [phone]

## 2021-06-04 NOTE — TELEPHONE ENCOUNTER
I think she probably should be seen for this.    Alternatively she can wait for Dr Brizuela who is back tomorrow

## 2021-06-04 NOTE — TELEPHONE ENCOUNTER
Patient Returning Call  Reason for call:  Patient calling back to verify that Dr Brizuela has gotten this message. Patient is concerned about not getting medications and ending up in the hospital.  Information relayed to patient:  Patient informed would update the message  Patient has additional questions:  No  If YES, what are your questions/concerns:  NA  Okay to leave a detailed message?: Yes

## 2021-06-04 NOTE — TELEPHONE ENCOUNTER
ANTICOAGULATION  MANAGEMENT PROGRAM    Mary Kay Tejada is overdue for INR check.     Spoke with Mary Kay and patient will have INR checked at next office visit on 12/16.  She reports that she is very ill and has been on abx and prednisone. Education provided that both of these can affect INR, encouraged her to come in ASAP this week to check an INR. She declined to schedule, states she will try to come in earlier but otherwise will plan to check at upcoming OV on 12/16.    Marija Crawley RN

## 2021-06-04 NOTE — PROGRESS NOTES
[facility] Care Coordination Contact    Received after visit chart from care coordinator.  Completed following tasks: Mailed copy of care plan to client, Updated services in access and Submitted referrals/auths for homemaking     and Provider Signature - No POC Shared:  Member indicates that they do not want their POC shared with any [facility] providers.      Prahsant Vargas  Care Management Specialist  [facility]  [phone]

## 2021-06-05 VITALS
SYSTOLIC BLOOD PRESSURE: 130 MMHG | BODY MASS INDEX: 25.92 KG/M2 | HEART RATE: 76 BPM | WEIGHT: 151 LBS | TEMPERATURE: 97.7 F | DIASTOLIC BLOOD PRESSURE: 57 MMHG

## 2021-06-05 VITALS
OXYGEN SATURATION: 99 % | DIASTOLIC BLOOD PRESSURE: 73 MMHG | SYSTOLIC BLOOD PRESSURE: 137 MMHG | WEIGHT: 156 LBS | HEART RATE: 84 BPM | RESPIRATION RATE: 16 BRPM | BODY MASS INDEX: 27.2 KG/M2

## 2021-06-05 VITALS
SYSTOLIC BLOOD PRESSURE: 119 MMHG | WEIGHT: 151 LBS | HEART RATE: 71 BPM | TEMPERATURE: 98.2 F | BODY MASS INDEX: 26.33 KG/M2 | DIASTOLIC BLOOD PRESSURE: 68 MMHG

## 2021-06-05 VITALS
TEMPERATURE: 97.2 F | SYSTOLIC BLOOD PRESSURE: 121 MMHG | WEIGHT: 152 LBS | RESPIRATION RATE: 16 BRPM | HEIGHT: 64 IN | BODY MASS INDEX: 25.95 KG/M2 | DIASTOLIC BLOOD PRESSURE: 62 MMHG | HEART RATE: 76 BPM

## 2021-06-05 VITALS
BODY MASS INDEX: 26.02 KG/M2 | WEIGHT: 152.4 LBS | HEART RATE: 76 BPM | DIASTOLIC BLOOD PRESSURE: 52 MMHG | SYSTOLIC BLOOD PRESSURE: 122 MMHG | RESPIRATION RATE: 16 BRPM | HEIGHT: 64 IN

## 2021-06-05 VITALS
BODY MASS INDEX: 27.02 KG/M2 | DIASTOLIC BLOOD PRESSURE: 62 MMHG | RESPIRATION RATE: 12 BRPM | TEMPERATURE: 98.2 F | HEART RATE: 73 BPM | SYSTOLIC BLOOD PRESSURE: 113 MMHG | HEIGHT: 63 IN | WEIGHT: 152.5 LBS

## 2021-06-05 VITALS
SYSTOLIC BLOOD PRESSURE: 129 MMHG | DIASTOLIC BLOOD PRESSURE: 71 MMHG | RESPIRATION RATE: 16 BRPM | BODY MASS INDEX: 25.98 KG/M2 | WEIGHT: 149 LBS | HEART RATE: 74 BPM

## 2021-06-05 VITALS
BODY MASS INDEX: 26.15 KG/M2 | TEMPERATURE: 97 F | DIASTOLIC BLOOD PRESSURE: 60 MMHG | RESPIRATION RATE: 20 BRPM | HEART RATE: 74 BPM | WEIGHT: 150 LBS | SYSTOLIC BLOOD PRESSURE: 120 MMHG

## 2021-06-05 VITALS
BODY MASS INDEX: 27.04 KG/M2 | WEIGHT: 155.1 LBS | SYSTOLIC BLOOD PRESSURE: 126 MMHG | DIASTOLIC BLOOD PRESSURE: 52 MMHG | HEART RATE: 76 BPM | RESPIRATION RATE: 20 BRPM

## 2021-06-05 VITALS
DIASTOLIC BLOOD PRESSURE: 68 MMHG | TEMPERATURE: 99 F | HEART RATE: 81 BPM | WEIGHT: 154 LBS | BODY MASS INDEX: 26.85 KG/M2 | RESPIRATION RATE: 18 BRPM | SYSTOLIC BLOOD PRESSURE: 124 MMHG

## 2021-06-05 NOTE — PROGRESS NOTES
ASSESSMENT/PLAN:  1. Trigger point of thoracic region  lidocaine 10 mg/mL (1 %) injection 10 mL   2. Chronic pain syndrome  oxyCODONE (ROXICODONE) 10 mg immediate release tablet   3. Flu vaccine need  Influenza High Dose,Seasonal,PF 65+ Yrs   4. Primary osteoarthritis of both knees  Ambulatory referral to Orthopedics   5. Moderate episode of recurrent major depressive disorder (H)     6. Essential hypertension  Comprehensive Metabolic Panel   7. COPD exacerbation (H)     8. Mixed hyperlipidemia  Comprehensive Metabolic Panel    Lipid Profile   9. Type 2 diabetes mellitus with hypoglycemia without coma, without long-term current use of insulin (H)  Glycosylated Hemoglobin A1c   10. Acute gastritis without hemorrhage, unspecified gastritis type  HM1(CBC and Differential)    HM1 (CBC with Diff)   11. Chronic a-fib         This is a 69 yo female with:  1.  Trigger point/Fibromyalgia/chronic pain syndrome - patient takes chronic opiate therapy - unable to tolerate any NSAIDs due to previous gastric ulcer disease; hasn't used escalating amounts - fills monthly - has benefited from trigger point injections - done today (procedure note below).   2.  Health Maintenance - due for influenza vaccine - ordered today  3.  Osteoarthritis - bilateral knees - is ready to start thinking about surgical repair of knees - will see Ortho  4.  Hypertension - blood pressure is well controlled - continue current meds  5.  Moderate depression -   PHQ-9 Total Score: 4 (11/18/2019 11:30 AM)  generally stable  6.  COPD exacerbation - gnerally stable -   7.  Hyperlipidemia - on Atorvastatin - follow labs  8.  Type II DM - blood sugars are okay - per patient  9.  Acute gastritis - has had previous GI bleeding - check anemia labs  10.  Chronic atrial fib - needs INR    Return in about 1 month (around 2/17/2020) for Recheck.  Administrations This Visit     lidocaine 10 mg/mL (1 %) injection 10 mL     Admin Date  01/17/2020 Action  Given Dose  10 mL  Route  Other Administered By  Shefali Davis MA                Medications Discontinued During This Encounter   Medication Reason     oxyCODONE (ROXICODONE) 10 mg immediate release tablet Reorder     There are no Patient Instructions on file for this visit.    Chief Complaint:  Chief Complaint   Patient presents with     Injections     trigger finger       HPI:   Mary Kay Tejada is a 70 y.o. female c/o  1.  Needs trigger point injections  2.  Type ii dm - numbers okay  3.  Knees - wonders if she should se Ortho at this point - thinks it's time to get surgery  4.  Depression - feels generally stable      PMH:   Patient Active Problem List    Diagnosis Date Noted     Primary osteoarthritis of both knees 01/17/2020     Mixed stress and urge urinary incontinence 08/16/2019     Skin lesion 07/17/2019     Chronic a-fib      Dyslipidemia      Upper GI bleed 12/13/2018     Melena 12/13/2018     Anemia due to blood loss, acute 07/18/2018     Diabetic polyneuropathy associated with type 2 diabetes mellitus (H) 07/18/2018     Chronic anemia 06/27/2018     Cataract 11/06/2017     Bunion, left 07/19/2017     Callus of foot 07/19/2017     Nonrheumatic aortic valve stenosis 05/23/2017     COPD (chronic obstructive pulmonary disease) (H) 05/23/2017     Chronic obstructive pulmonary disease with acute lower respiratory infection (H) 12/24/2016     Gastroenteritis 12/24/2016     Syncope 12/22/2016     Opacity of lung on imaging study 12/22/2016     Acute gastritis 12/22/2016     Osteoarthritis of both knees 08/22/2016     Osteoarthritis, hip, bilateral 06/20/2016     Primary osteoarthritis of left knee 06/20/2016     Candidal intertrigo 05/11/2016     Type 2 diabetes mellitus with hypoglycemia (H)      Aspiration pneumonia (H) 03/01/2016     Controlled substance agreement signed 11/23/2015     Cervical neck pain with evidence of disc disease 07/22/2015     Headache 07/17/2015     Hematoma of abdominal wall 07/05/2015     Skin  lesion of face 05/22/2015     Tendonitis of wrist, right 04/22/2015     Menopause 04/06/2015     Flashers or floaters of right eye 02/23/2015     Tendonitis of wrist, left 01/21/2015     Trigger point of thoracic region 01/21/2015     Generalized osteoarthrosis, involving multiple sites 01/14/2015     Vertigo 12/17/2014     Rotator cuff tendonitis 12/17/2014     Abnormal Weight Loss      Osteoarthritis Of The Shoulder      Chronic Constipation      Onychomycosis Of The Toenails      Diarrhea      Ganglion Of The Left Wrist      Larynx Edema      Obesity      Medial Epicondylitis      Skin Lesion      Urinary Incontinence      Chronic Major Depression      Dry Eye Syndrome      Nicotine Dependence      Hypokalemia      Essential hypertension      Muscle Cramps In The Calf      Edema      Atrial flutter (H)      Lump In / On The Skin      Chronic pain syndrome      Paroxysmal Atrial Fibrillation      Fibromyalgia      Nausea      Abdominal Pain      Peptic Ulcer      COPD exacerbation (H)      Mixed hyperlipidemia      Chronic Reflux Esophagitis      Benign Adenomatous Polyp Of The Large Intestine      Past Medical History:   Diagnosis Date     Anemia      Aortic stenosis      Atrial fibrillation (H)      Atrial flutter (H)      Benign neoplasm of adenomatous polyp     large intestine      Chronic constipation      Chronic pain syndrome      COPD (chronic obstructive pulmonary disease) (H)     Oxygen at night      Dependence on supplemental oxygen     Oxygen at noc, during the day as needed     Depression      Diabetes mellitus (H)      Dry eye syndrome      Fibromyalgia      Ganglion     left wrist     GERD (gastroesophageal reflux disease)      Hyperlipidemia      Hypertension      Hypokalemia      Larynx edema      Lung disease      Malignant Vulvar Neoplasm     Created by [facility] Annotation: Apr 17 2007  8:24AM - Cammy Bui:  resection per Dr. Alfonso Mane 9/06;  Replacement Utility  updated for latest IMO load     Medial epicondylitis      Onychomycosis      Osteoarthritis      Otitis Externa     Created by Conversion      Peptic ulcer      Polyneuropathy      Vulvar malignant neoplasm (H)      Past Surgical History:   Procedure Laterality Date     BREAST BIOPSY Right      BREAST BIOPSY Right 01/28/2015     BREAST BIOPSY Right 1/28/2015    Procedure: RIGHT BREAST BIOPSY AFTER WIRE LOCALIZATION AT 0940;  Surgeon: Renée Soriano MD;  Location: [facility];  Service:      COLONOSCOPY N/A 6/14/2019    Procedure: COLONOSCOPY;  Surgeon: Eduardo Mora MD;  Location: [facility];  Service: Gastroenterology     ESOPHAGOGASTRODUODENOSCOPY N/A 11/6/2018    Procedure: ESOPHAGOGASTRODUODENOSCOPY;  Surgeon: Lit Fernando MD;  Location: [facility];  Service:      HYSTERECTOMY       JOINT REPLACEMENT Left     TKA     MT ABLATE HEART DYSRHYTHM FOCUS      Description: Catheter Ablation Atrial Fibrillation;  Recorded: 07/31/2012;  Comments: 7/24/12 PVI with Dr. Gardiner and nilay to all 5 pulm veins and CTI fl ablation line as well.     MT BIOPSY OF BREAST, INCISIONAL      Description: Incisional Breast Biopsy;  Recorded: 11/13/2007;  Comments: benign     MT SUPRACERV ABD HYSTERECTOMY      Description: Supracervical Hysterectomy;  Proc Date: 01/01/1985;  Comments: some cervix left!; ovaries intact; done for bleeding     Social History     Socioeconomic History     Marital status:      Spouse name: Not on file     Number of children: Not on file     Years of education: Not on file     Highest education level: Not on file   Occupational History     Not on file   Social Needs     Financial resource strain: Not on file     Food insecurity:     Worry: Not on file     Inability: Not on file     Transportation needs:     Medical: Not on file     Non-medical: Not on file   Tobacco Use     Smoking status: Current Every Day Smoker     Packs/day: 0.50     Types: Cigarettes     Smokeless  tobacco: Never Used   Substance and Sexual Activity     Alcohol use: Yes     Comment: very little     Drug use: No     Sexual activity: Not on file   Lifestyle     Physical activity:     Days per week: Not on file     Minutes per session: Not on file     Stress: Not on file   Relationships     Social connections:     Talks on phone: Not on file     Gets together: Not on file     Attends Evangelical service: Not on file     Active member of club or organization: Not on file     Attends meetings of clubs or organizations: Not on file     Relationship status: Not on file     Intimate partner violence:     Fear of current or ex partner: Not on file     Emotionally abused: Not on file     Physically abused: Not on file     Forced sexual activity: Not on file   Other Topics Concern     Not on file   Social History Narrative     Not on file     Family History   Problem Relation Age of Onset     Heart failure Mother      Cancer Other         paternal HX-laryngeal      Alcoholism Sister      No Medical Problems Daughter      No Medical Problems Maternal Grandmother      No Medical Problems Maternal Grandfather      No Medical Problems Paternal Grandmother      No Medical Problems Paternal Grandfather      No Medical Problems Maternal Aunt      No Medical Problems Paternal Aunt      Alcoholism Sister      Alcoholism Brother      Alcohol abuse Father      Cancer Paternal Uncle         Gastric-Alcohol     Cancer Paternal Uncle         gastric-Alcohol     BRCA 1/2 Neg Hx      Breast cancer Neg Hx      Colon cancer Neg Hx      Endometrial cancer Neg Hx      Ovarian cancer Neg Hx        Meds:    Current Outpatient Medications:      ACCU-CHEK SOFTCLIX LANCETS lancets, TEST THREE TIMES DAILY, Disp: 300 each, Rfl: 3     albuterol (PROAIR HFA;PROVENTIL HFA;VENTOLIN HFA) 90 mcg/actuation inhaler, INHALE 2 PUFFS EVERY 4 HOURS AS NEEDED WHEEZING, Disp: 90 g, Rfl: 11     albuterol (PROVENTIL) 2.5 mg /3 mL (0.083 %) nebulizer solution, Take  "3 mL (2.5 mg total) by nebulization every 4 (four) hours as needed for wheezing or shortness of breath., Disp: 75 mL, Rfl: 12     atorvastatin (LIPITOR) 20 MG tablet, TAKE 1 TABLET(20 MG) BY MOUTH AT BEDTIME, Disp: 90 tablet, Rfl: 3     BD ULTRA-FINE SHORT PEN NEEDLE 31 gauge x 5/16\" Ndle, TEST FOUR TIMES DAILY WITH MEALS AND AT BEDTIME, Disp: 400 each, Rfl: 3     blood glucose test strips, Use 1 each As Directed 3 (three) times a day as needed (for blood glucose testing). Dispense strips that are for One Touch Verio IQ meter, Disp: 300 strip, Rfl: 3     blood-glucose meter (ONETOUCH VERIO IQ METER) Misc, Check blood sugar three times a day., Disp: 1 each, Rfl: 0     budesonide-formoterol (SYMBICORT) 160-4.5 mcg/actuation inhaler, Inhale 2 puffs 2 (two) times a day., Disp: 1 Inhaler, Rfl: 2     codeine-guaiFENesin (GUAIFENESIN AC)  mg/5 mL liquid, Take 5 mL by mouth 3 (three) times a day as needed for cough., Disp: 240 mL, Rfl: 0     cyclobenzaprine (FLEXERIL) 10 MG tablet, Take 1 tablet (10 mg total) by mouth at bedtime as needed for muscle spasms., Disp: 30 tablet, Rfl: 0     diaper,brief,adult,disposable (ADULT BRIEFS - LARGE) Misc, Use 3-4 daily as needed for incontinence, Disp: 120 each, Rfl: 6     diltiazem (CARTIA XT) 120 MG 24 hr capsule, Take 1 capsule (120 mg total) by mouth daily., Disp: 90 capsule, Rfl: 3     furosemide (LASIX) 20 MG tablet, Take 1 tablet (20 mg total) by mouth daily as needed., Disp: 90 tablet, Rfl: 3     gabapentin (NEURONTIN) 300 MG capsule, Take 300 mg by mouth 2 (two) times a day. 300 mg two times a day, 600 mg at bedtime   , Disp: , Rfl:      gabapentin (NEURONTIN) 600 MG tablet, TAKE 1 TABLET(600 MG) BY MOUTH THREE TIMES DAILY, Disp: 90 tablet, Rfl: 3     generic lancets (FINGERSTIX LANCETS), Dispense brand per patient's insurance at pharmacy discretion., Disp: 300 each, Rfl: 0     gentamicin (GARAMYCIN) 0.1 % ointment, APPLY TOPICALLY TO WOUND BID, Disp: , Rfl: 0     " insulin aspart U-100 (NOVOLOG) 100 unit/mL injection, Inject under the skin 3 (three) times a day before meals. Inject per sliding scale, Disp: , Rfl:      ipratropium-albuterol (DUO-NEB) 0.5-2.5 mg/3 mL nebulizer, INAHALE 1 VIAL IN NEBULIZER EVERY 4 HOURS AS NEEDED, Disp: 1440 mL, Rfl: 0     meclizine (ANTIVERT) 25 mg tablet, TAKE 1 TABLET(25 MG) BY MOUTH THREE TIMES DAILY AS NEEDED FOR DIZZINESS OR NAUSEA, Disp: 45 tablet, Rfl: 5     metFORMIN (GLUCOPHAGE) 500 MG tablet, Take 1 tablet (500 mg total) by mouth 2 (two) times a day with meals., Disp: 180 tablet, Rfl: 3     nystatin (NYSTOP) powder, Apply 1 application topically 2 (two) times a day as needed. 2-3 times to affected area(s)., Disp: 15 g, Rfl: 3     oxyCODONE (ROXICODONE) 10 mg immediate release tablet, Take 1 tablet (10 mg total) by mouth every 6 (six) hours as needed., Disp: 120 tablet, Rfl: 0     polyethylene glycol (MIRALAX) 17 gram packet, Take 1 packet (17 g total) by mouth daily. (Patient taking differently: Take 17 g by mouth daily as needed.    ), Disp: , Rfl: 0     ranitidine (ZANTAC) 150 MG tablet, TAKE 1 TABLET(150 MG) BY MOUTH TWICE DAILY, Disp: 180 tablet, Rfl: 3     sucralfate (CARAFATE) 1 gram tablet, TAKE 1 TABLET BY MOUTH FOUR TIMES DAILY(CRUSH TABLET AND MIX IT WITH A LITTLE WATER, THEN SWALLOW), Disp: 120 tablet, Rfl: 12     SYMBICORT 160-4.5 mcg/actuation inhaler, INHALE 2 PUFFS BY MOUTH TWICE DAILY, Disp: 1 Inhaler, Rfl: 2     warfarin (COUMADIN/JANTOVEN) 5 MG tablet, Take 2.5 to 5mg (1/2 or 1 tabs) by mouth daily as directed.  Adjust dose based on INR. Take 2.5 mg Monday and 5 mg the rest of the week, to be started in 2 day, hold if dark stool., Disp: , Rfl:     Current Facility-Administered Medications:      lidocaine 1%-EPINEPHrine 1:100,000 1 %-1:100,000 injection 1 mL (XYLOCAINE W/EPI), 1 mL, Other, Once, Cammy Bui MD     lidocaine 10 mg/mL (1 %) injection 10 mL, 10 mL, Other, Once, Ramya,  "MD Cammy    Allergies:  Allergies   Allergen Reactions     Celebrex [Celecoxib] Rash     patient had butterfly rash - \"lupus-like\"       Latex Rash       ROS:  Pertinent positives as noted in HPI; otherwise 12 point ROS negative.      Physical Exam:  EXAM:  /72 (Patient Site: Right Arm, Patient Position: Sitting, Cuff Size: Adult Regular)   Pulse (!) 103   Resp 21   Wt 166 lb (75.3 kg)   BMI 28.49 kg/m     Gen:  NAD, appears well, well-hydrated  HEENT:  TMs nl, oropharynx benign, nasal mucosa nl, conjunctiva clear  Neck:  Supple, no adenopathy, no thyromegaly, no carotid bruits, no JVD  Lungs:  Clear to auscultation bilaterally  Cor:  RRR no murmur  Abd:  Soft, nontender, BS+, no masses, no guarding or rebound, no HSM  Back:  Trigger point tenderness in muscle / trigger points right upper thoracic   Extr:  Neg.  Neuro:  No asymmetry  Skin:  Warm/dry        Results:  Results for orders placed or performed in visit on 01/17/20   Glycosylated Hemoglobin A1c   Result Value Ref Range    Hemoglobin A1c 5.9 3.5 - 6.0 %   Comprehensive Metabolic Panel   Result Value Ref Range    Sodium 143 136 - 145 mmol/L    Potassium 4.5 3.5 - 5.0 mmol/L    Chloride 103 98 - 107 mmol/L    CO2 27 22 - 31 mmol/L    Anion Gap, Calculation 13 5 - 18 mmol/L    Glucose 92 70 - 125 mg/dL    BUN 13 8 - 28 mg/dL    Creatinine 0.64 0.60 - 1.10 mg/dL    GFR MDRD Af Amer >60 >60 mL/min/1.73m2    GFR MDRD Non Af Amer >60 >60 mL/min/1.73m2    Bilirubin, Total 0.4 0.0 - 1.0 mg/dL    Calcium 9.5 8.5 - 10.5 mg/dL    Protein, Total 6.9 6.0 - 8.0 g/dL    Albumin 3.8 3.5 - 5.0 g/dL    Alkaline Phosphatase 103 45 - 120 U/L    AST 19 0 - 40 U/L    ALT 16 0 - 45 U/L   Lipid Profile   Result Value Ref Range    Triglycerides 117 <=149 mg/dL    Cholesterol 179 <=199 mg/dL    LDL Calculated 83 <=129 mg/dL    HDL Cholesterol 73 >=50 mg/dL    Patient Fasting > 8hrs? Yes    HM1 (CBC with Diff)   Result Value Ref Range    WBC 7.5 4.0 - 11.0 thou/uL    RBC " 4.25 3.80 - 5.40 mill/uL    Hemoglobin 12.9 12.0 - 16.0 g/dL    Hematocrit 39.4 35.0 - 47.0 %    MCV 93 80 - 100 fL    MCH 30.3 27.0 - 34.0 pg    MCHC 32.7 32.0 - 36.0 g/dL    RDW 13.2 11.0 - 14.5 %    Platelets 220 140 - 440 thou/uL    MPV 8.2 7.0 - 10.0 fL    Neutrophils % 66 50 - 70 %    Lymphocytes % 24 20 - 40 %    Monocytes % 8 2 - 10 %    Eosinophils % 3 0 - 6 %    Basophils % 1 0 - 2 %    Neutrophils Absolute 4.9 2.0 - 7.7 thou/uL    Lymphocytes Absolute 1.8 0.8 - 4.4 thou/uL    Monocytes Absolute 0.6 0.0 - 0.9 thou/uL    Eosinophils Absolute 0.2 0.0 - 0.4 thou/uL    Basophils Absolute 0.0 0.0 - 0.2 thou/uL       Procedure Note - Trigger Point Injections    Consent obtained: verbal    Indication:  Trigger point pain    5 trigger points identified.  Each trigger point swabbed x 3 with Betadine swab.  Each trigger point then injected with 2 ml of 1% Lidocaine (no epi).    Total volume injected:  10 ml    Complications:  None, patient tolerated procedure well.    Plan:  Discussed care with patient.  RTC for further injections in 30 days prn.

## 2021-06-05 NOTE — TELEPHONE ENCOUNTER
ANTICOAGULATION  MANAGEMENT    Assessment     Today's INR result of 4.1 is Supratherapeutic (goal INR of 2.0-3.0)        Warfarin taken as previously instructed    Decreased greens/vitamin K intake may be affecting INR    No new medication/supplements affecting INR    Continues to tolerate warfarin with no reported s/s of bleeding or thromboembolism     Previous INR was Supratherapeutic    Plan:     Spoke with Mary Kay regarding INR result and instructed:     Warfarin Dosing Instructions:  hold dose today then change warfarin dose to 2.5 mg daily on Tuesdays and Saturdays; and 5 mg daily rest of week  (7.7 % change)    Instructed patient to follow up no later than: 7-10 days    Education provided: importance of therapeutic range, importance of following up for INR monitoring at instructed interval, importance of taking warfarin as instructed, monitoring for bleeding signs and symptoms and when to seek medical attention/emergency care    Mary Kay verbalizes understanding and agrees to warfarin dosing plan.    Instructed to call the [facility] for any changes, questions or concerns. (#[phone])   ?   Mena Dumont RN    Subjective/Objective:      Mary Kaydilma Tejada, a 70 y.o. female is on warfarin.     Mary Kay reports:     Home warfarin dose: verbally confirmed home dose with Mary Kay and updated on anticoagulation calendar     Missed doses: No     Medication changes:  No     S/S of bleeding or thromboembolism:  No     New Injury or illness:  Yes: hurt from her trigger point injections today.     Changes in diet or alcohol consumption:  Yes-diet still not back to normal since being sick.     Upcoming surgery, procedure or cardioversion:  No    Anticoagulation Episode Summary     Current INR goal:   2.0-3.0   TTR:   48.9 % (1 y)   Next INR check:   1/27/2020   INR from last check:   4.10! (1/17/2020)   Weekly max warfarin dose:      Target end date:   Indefinite   INR check location:      Preferred lab:       Send INR reminders to:   [facility]    Indications    Paroxysmal Atrial Fibrillation [I48.91]           Comments:            Anticoagulation Care Providers     Provider Role Specialty Phone number    Cammy Bui MD Referring Family Medicine [phone]

## 2021-06-05 NOTE — TELEPHONE ENCOUNTER
Who is calling:  Patient  Reason for Call:  Same day lab appointment for INR 2/3 at 11:40 am. Patient will be arriving at 11:30 am today.  Okay to leave a detailed message: No

## 2021-06-05 NOTE — TELEPHONE ENCOUNTER
ANTICOAGULATION  MANAGEMENT    Assessment     Today's INR result of 1.5 is Subtherapeutic (goal INR of 2.0-3.0)        Warfarin taken as previously instructed    No new diet changes affecting INR    No new medication/supplements affecting INR    Continues to tolerate warfarin with no reported s/s of bleeding or thromboembolism     Previous INR was Supratherapeutic for unknown reasons and possibly decreased Vit K intake. She has NOT started eating more Vit K foods today    Plan:     Spoke with Mary Kay regarding INR result and instructed:     Warfarin Dosing Instructions:  Boost dose of warfarin today MOn 2/3 take 7.5mg then change warfarin dose to    2.5 mg every Sun, Thu; 5 mg all other days         Instructed patient to follow up no later than: 2 weeks at OV    Education provided: importance of consistent vitamin K intake, importance of therapeutic range, target INR goal and significance of current INR result, monitoring for bleeding signs and symptoms and monitoring for clotting signs and symptoms    Mary Kay verbalizes understanding and agrees to warfarin dosing plan.    Instructed to call the [facility] for any changes, questions or concerns. (#[phone])   ?   Marija Crawley RN    Subjective/Objective:      Mary Kay Tejada, a 70 y.o. female is on warfarin.     Mary Kay reports:     Home warfarin dose: as updated on anticoagulation calendar per template     Missed doses: No     Medication changes:  No     S/S of bleeding or thromboembolism:  No     New Injury or illness:  No     Changes in diet or alcohol consumption:  No     Upcoming surgery, procedure or cardioversion:  No    Anticoagulation Episode Summary     Current INR goal:   2.0-3.0   TTR:   45.7 % (1 y)   Next INR check:   2/17/2020   INR from last check:   1.50! (2/3/2020)   Weekly max warfarin dose:      Target end date:   Indefinite   INR check location:      Preferred lab:      Send INR reminders to:   [facility]     Indications    Paroxysmal Atrial Fibrillation [I48.91]           Comments:            Anticoagulation Care Providers     Provider Role Specialty Phone number    Cammy Bui MD [facility] [phone]

## 2021-06-05 NOTE — PROGRESS NOTES
[facility] Care Coordination Contact    Spoke with member and she reports the pulls she received today was the incorrect size, which is a large. She needs a medium.     Telephone call to [facility] and spoke with Rony, he made the changes to size med and they will ship it out within the next 3-5 business days. [facility] is unable to  the large pull ups from member; member can drop it off per Rony.    Called member back and informed her of the above.    Samantha Alexandra RN, PHN   [facility]  [phone]

## 2021-06-05 NOTE — TELEPHONE ENCOUNTER
ANTICOAGULATION  MANAGEMENT    Assessment     Today's INR result of 3.9 is Supratherapeutic (goal INR of 2.0-3.0)        Warfarin taken as previously instructed    Decreased greens/vitamin K intake may be affecting INR    No new medication/supplements affecting INR    Continues to tolerate warfarin with no reported s/s of bleeding or thromboembolism     Previous INR was Supratherapeutic    Plan:     Spoke with Mary Kay regarding INR result and instructed:     Warfarin Dosing Instructions:  hold does today then change warfarin dose to 5 mg daily on Mondays, Wednesdays and Friday; and 2.5 mg daily rest of week  (0 % change)This dose change equals dose she has taken in the past week.    Instructed patient to follow up no later than: 1-2 weeks.    Education provided: importance of therapeutic range, importance of following up for INR monitoring at instructed interval and importance of taking warfarin as instructed    Mary Kay verbalizes understanding and agrees to warfarin dosing plan.    Instructed to call the [facility] for any changes, questions or concerns. (#[phone])   ?   Mena Dumont RN    Subjective/Objective:      Mary Kay Tejada, a 70 y.o. female is on warfarin.     Mary Kay reports:     Home warfarin dose: verbally confirmed home dose with Mary Kay and updated on anticoagulation calendar     Missed doses: No-she held as instructed.     Medication changes:  No     S/S of bleeding or thromboembolism:  No     New Injury or illness:  No     Changes in diet or alcohol consumption:  Yes-continues to eat less greens.     Upcoming surgery, procedure or cardioversion:  No    Anticoagulation Episode Summary     Current INR goal:   2.0-3.0   TTR:   47.8 % (1 y)   Next INR check:   2/4/2020   INR from last check:   3.90! (1/21/2020)   Weekly max warfarin dose:      Target end date:   Indefinite   INR check location:      Preferred lab:      Send INR reminders to:   [facility]    Indications     Paroxysmal Atrial Fibrillation [I48.91]           Comments:            Anticoagulation Care Providers     Provider Role Specialty Phone number    Cammy Bui MD [facility] [phone]

## 2021-06-06 NOTE — PROGRESS NOTES
ASSESSMENT/PLAN:  1. Fibromyalgia     2. Chronic pain syndrome  oxyCODONE (ROXICODONE) 10 mg immediate release tablet    lidocaine 1%-EPINEPHrine 1:100,000 1 %-1:100,000 injection 10 mL (XYLOCAINE W/EPI)       This is a 69 yo female with:  1.  Fibromyalgia with trigger point pain - will do trigger point injections - she has done well with this - it keeps her from increasing her pain medications.  2.  Chronic pain syndrome - as above - see procedure note  Return in about 1 month (around 3/17/2020) for Recheck.      Medications Discontinued During This Encounter   Medication Reason     oxyCODONE (ROXICODONE) 10 mg immediate release tablet Reorder     There are no Patient Instructions on file for this visit.    Chief Complaint:  Chief Complaint   Patient presents with     Follow Up     trigger point shot        HPI:   Mary Kay Tejada is a 70 y.o. female c/o  Pain syndrome - no new symptoms  Needs refill on pain meds      PMH:   Patient Active Problem List    Diagnosis Date Noted     Primary osteoarthritis of both knees 01/17/2020     Mixed stress and urge urinary incontinence 08/16/2019     Skin lesion 07/17/2019     Chronic a-fib      Dyslipidemia      Upper GI bleed 12/13/2018     Melena 12/13/2018     Anemia due to blood loss, acute 07/18/2018     Diabetic polyneuropathy associated with type 2 diabetes mellitus (H) 07/18/2018     Chronic anemia 06/27/2018     Cataract 11/06/2017     Bunion, left 07/19/2017     Callus of foot 07/19/2017     Nonrheumatic aortic valve stenosis 05/23/2017     COPD (chronic obstructive pulmonary disease) (H) 05/23/2017     Chronic obstructive pulmonary disease with acute lower respiratory infection (H) 12/24/2016     Gastroenteritis 12/24/2016     Syncope 12/22/2016     Opacity of lung on imaging study 12/22/2016     Acute gastritis 12/22/2016     Osteoarthritis of both knees 08/22/2016     Osteoarthritis, hip, bilateral 06/20/2016     Primary osteoarthritis of left knee 06/20/2016      Candidal intertrigo 05/11/2016     Type 2 diabetes mellitus with hypoglycemia (H)      Aspiration pneumonia (H) 03/01/2016     Controlled substance agreement signed 11/23/2015     Cervical neck pain with evidence of disc disease 07/22/2015     Headache 07/17/2015     Hematoma of abdominal wall 07/05/2015     Skin lesion of face 05/22/2015     Tendonitis of wrist, right 04/22/2015     Menopause 04/06/2015     Flashers or floaters of right eye 02/23/2015     Tendonitis of wrist, left 01/21/2015     Trigger point of thoracic region 01/21/2015     Generalized osteoarthrosis, involving multiple sites 01/14/2015     Vertigo 12/17/2014     Rotator cuff tendonitis 12/17/2014     Abnormal Weight Loss      Osteoarthritis Of The Shoulder      Chronic Constipation      Onychomycosis Of The Toenails      Diarrhea      Ganglion Of The Left Wrist      Larynx Edema      Obesity      Medial Epicondylitis      Skin Lesion      Urinary Incontinence      Chronic Major Depression      Dry Eye Syndrome      Nicotine Dependence      Hypokalemia      Essential hypertension      Muscle Cramps In The Calf      Edema      Atrial flutter (H)      Lump In / On The Skin      Chronic pain syndrome      Paroxysmal Atrial Fibrillation      Fibromyalgia      Nausea      Abdominal Pain      Peptic Ulcer      COPD exacerbation (H)      Mixed hyperlipidemia      Chronic Reflux Esophagitis      Benign Adenomatous Polyp Of The Large Intestine      Past Medical History:   Diagnosis Date     Anemia      Aortic stenosis      Atrial fibrillation (H)      Atrial flutter (H)      Benign neoplasm of adenomatous polyp     large intestine      Chronic constipation      Chronic pain syndrome      COPD (chronic obstructive pulmonary disease) (H)     Oxygen at night      Dependence on supplemental oxygen     Oxygen at noc, during the day as needed     Depression      Diabetes mellitus (H)      Dry eye syndrome      Fibromyalgia      Ganglion     left wrist     GERD  (gastroesophageal reflux disease)      Hyperlipidemia      Hypertension      Hypokalemia      Larynx edema      Lung disease      Malignant Vulvar Neoplasm     Created by Conversion [facility] Annotation: Apr 17 2007  8:24AM - LavelltomasCurtHarpalYulianaCammy:  resection per Dr. Alfonso Mane 9/06;  Replacement Utility updated for latest IMO load     Medial epicondylitis      Onychomycosis      Osteoarthritis      Otitis Externa     Created by Conversion      Peptic ulcer      Polyneuropathy      Vulvar malignant neoplasm (H)      Past Surgical History:   Procedure Laterality Date     BREAST BIOPSY Right      BREAST BIOPSY Right 01/28/2015     BREAST BIOPSY Right 1/28/2015    Procedure: RIGHT BREAST BIOPSY AFTER WIRE LOCALIZATION AT 0940;  Surgeon: Renée Soriano MD;  Location: [facility];  Service:      COLONOSCOPY N/A 6/14/2019    Procedure: COLONOSCOPY;  Surgeon: Eduardo Mora MD;  Location: [facility];  Service: Gastroenterology     ESOPHAGOGASTRODUODENOSCOPY N/A 11/6/2018    Procedure: ESOPHAGOGASTRODUODENOSCOPY;  Surgeon: Lit Fernando MD;  Location: [facility];  Service:      HYSTERECTOMY       JOINT REPLACEMENT Left     TKA     CO ABLATE HEART DYSRHYTHM FOCUS      Description: Catheter Ablation Atrial Fibrillation;  Recorded: 07/31/2012;  Comments: 7/24/12 PVI with Dr. Gardiner and nilay to all 5 pulm veins and CTI fl ablation line as well.     CO BIOPSY OF BREAST, INCISIONAL      Description: Incisional Breast Biopsy;  Recorded: 11/13/2007;  Comments: benign     CO SUPRACERV ABD HYSTERECTOMY      Description: Supracervical Hysterectomy;  Proc Date: 01/01/1985;  Comments: some cervix left!; ovaries intact; done for bleeding     Social History     Socioeconomic History     Marital status:      Spouse name: Not on file     Number of children: Not on file     Years of education: Not on file     Highest education level: Not on file   Occupational History     Not on file   Social  Needs     Financial resource strain: Not on file     Food insecurity:     Worry: Not on file     Inability: Not on file     Transportation needs:     Medical: Not on file     Non-medical: Not on file   Tobacco Use     Smoking status: Current Every Day Smoker     Packs/day: 0.50     Types: Cigarettes     Smokeless tobacco: Never Used   Substance and Sexual Activity     Alcohol use: Yes     Comment: very little     Drug use: No     Sexual activity: Not on file   Lifestyle     Physical activity:     Days per week: Not on file     Minutes per session: Not on file     Stress: Not on file   Relationships     Social connections:     Talks on phone: Not on file     Gets together: Not on file     Attends Voodoo service: Not on file     Active member of club or organization: Not on file     Attends meetings of clubs or organizations: Not on file     Relationship status: Not on file     Intimate partner violence:     Fear of current or ex partner: Not on file     Emotionally abused: Not on file     Physically abused: Not on file     Forced sexual activity: Not on file   Other Topics Concern     Not on file   Social History Narrative     Not on file     Family History   Problem Relation Age of Onset     Heart failure Mother      Cancer Other         paternal HX-laryngeal      Alcoholism Sister      No Medical Problems Daughter      No Medical Problems Maternal Grandmother      No Medical Problems Maternal Grandfather      No Medical Problems Paternal Grandmother      No Medical Problems Paternal Grandfather      No Medical Problems Maternal Aunt      No Medical Problems Paternal Aunt      Alcoholism Sister      Alcoholism Brother      Alcohol abuse Father      Cancer Paternal Uncle         Gastric-Alcohol     Cancer Paternal Uncle         gastric-Alcohol     BRCA 1/2 Neg Hx      Breast cancer Neg Hx      Colon cancer Neg Hx      Endometrial cancer Neg Hx      Ovarian cancer Neg Hx        Meds:    Current Outpatient  "Medications:      ACCU-CHEK SOFTCLIX LANCETS lancets, TEST THREE TIMES DAILY, Disp: 300 each, Rfl: 3     albuterol (PROAIR HFA;PROVENTIL HFA;VENTOLIN HFA) 90 mcg/actuation inhaler, INHALE 2 PUFFS EVERY 4 HOURS AS NEEDED WHEEZING, Disp: 90 g, Rfl: 11     albuterol (PROVENTIL) 2.5 mg /3 mL (0.083 %) nebulizer solution, Take 3 mL (2.5 mg total) by nebulization every 4 (four) hours as needed for wheezing or shortness of breath., Disp: 75 mL, Rfl: 12     atorvastatin (LIPITOR) 20 MG tablet, TAKE 1 TABLET(20 MG) BY MOUTH AT BEDTIME, Disp: 90 tablet, Rfl: 3     BD ULTRA-FINE SHORT PEN NEEDLE 31 gauge x 5/16\" Ndle, TEST FOUR TIMES DAILY WITH MEALS AND AT BEDTIME, Disp: 400 each, Rfl: 3     blood glucose test strips, Use 1 each As Directed 3 (three) times a day as needed (for blood glucose testing). Dispense strips that are for One Touch Verio IQ meter, Disp: 300 strip, Rfl: 3     blood-glucose meter (ONETOUCH VERIO IQ METER) Misc, Check blood sugar three times a day., Disp: 1 each, Rfl: 0     budesonide-formoterol (SYMBICORT) 160-4.5 mcg/actuation inhaler, Inhale 2 puffs 2 (two) times a day., Disp: 1 Inhaler, Rfl: 2     codeine-guaiFENesin (GUAIFENESIN AC)  mg/5 mL liquid, Take 5 mL by mouth 3 (three) times a day as needed for cough., Disp: 240 mL, Rfl: 0     cyclobenzaprine (FLEXERIL) 10 MG tablet, Take 1 tablet (10 mg total) by mouth at bedtime as needed for muscle spasms., Disp: 30 tablet, Rfl: 0     diaper,brief,adult,disposable (ADULT BRIEFS - LARGE) Misc, Use 3-4 daily as needed for incontinence, Disp: 120 each, Rfl: 6     diltiazem (CARTIA XT) 120 MG 24 hr capsule, Take 1 capsule (120 mg total) by mouth daily., Disp: 90 capsule, Rfl: 3     furosemide (LASIX) 20 MG tablet, Take 1 tablet (20 mg total) by mouth daily as needed., Disp: 90 tablet, Rfl: 3     gabapentin (NEURONTIN) 300 MG capsule, Take 300 mg by mouth 2 (two) times a day. 300 mg two times a day, 600 mg at bedtime   , Disp: , Rfl:      gabapentin " (NEURONTIN) 600 MG tablet, TAKE 1 TABLET(600 MG) BY MOUTH THREE TIMES DAILY, Disp: 90 tablet, Rfl: 3     generic lancets (FINGERSTIX LANCETS), Dispense brand per patient's insurance at pharmacy discretion., Disp: 300 each, Rfl: 0     gentamicin (GARAMYCIN) 0.1 % ointment, APPLY TOPICALLY TO WOUND BID, Disp: , Rfl: 0     insulin aspart U-100 (NOVOLOG) 100 unit/mL injection, Inject under the skin 3 (three) times a day before meals. Inject per sliding scale, Disp: , Rfl:      ipratropium-albuterol (DUO-NEB) 0.5-2.5 mg/3 mL nebulizer, INAHALE 1 VIAL IN NEBULIZER EVERY 4 HOURS AS NEEDED, Disp: 1440 mL, Rfl: 0     meclizine (ANTIVERT) 25 mg tablet, TAKE 1 TABLET(25 MG) BY MOUTH THREE TIMES DAILY AS NEEDED FOR DIZZINESS OR NAUSEA, Disp: 45 tablet, Rfl: 5     metFORMIN (GLUCOPHAGE) 500 MG tablet, Take 1 tablet (500 mg total) by mouth 2 (two) times a day with meals., Disp: 180 tablet, Rfl: 3     nystatin (NYSTOP) powder, Apply 1 application topically 2 (two) times a day as needed. 2-3 times to affected area(s)., Disp: 15 g, Rfl: 3     oxyCODONE (ROXICODONE) 10 mg immediate release tablet, Take 1 tablet (10 mg total) by mouth every 6 (six) hours as needed., Disp: 120 tablet, Rfl: 0     polyethylene glycol (MIRALAX) 17 gram packet, Take 1 packet (17 g total) by mouth daily. (Patient taking differently: Take 17 g by mouth daily as needed.    ), Disp: , Rfl: 0     ranitidine (ZANTAC) 150 MG tablet, TAKE 1 TABLET(150 MG) BY MOUTH TWICE DAILY, Disp: 180 tablet, Rfl: 3     sucralfate (CARAFATE) 1 gram tablet, TAKE 1 TABLET BY MOUTH FOUR TIMES DAILY(CRUSH TABLET AND MIX IT WITH A LITTLE WATER, THEN SWALLOW), Disp: 120 tablet, Rfl: 12     SYMBICORT 160-4.5 mcg/actuation inhaler, INHALE 2 PUFFS BY MOUTH TWICE DAILY, Disp: 1 Inhaler, Rfl: 2     warfarin (COUMADIN/JANTOVEN) 5 MG tablet, Take 2.5 to 5mg (1/2 or 1 tabs) by mouth daily as directed.  Adjust dose based on INR. Take 2.5 mg Monday and 5 mg the rest of the week, to be started  "in 2 day, hold if dark stool., Disp: , Rfl:     Current Facility-Administered Medications:      lidocaine 1%-EPINEPHrine 1:100,000 1 %-1:100,000 injection 1 mL (XYLOCAINE W/EPI), 1 mL, Other, Once, Cammy Bui MD     lidocaine 1%-EPINEPHrine 1:100,000 1 %-1:100,000 injection 10 mL (XYLOCAINE W/EPI), 10 mL, Other, Once, Cammy Bui MD     lidocaine 10 mg/mL (1 %) injection 10 mL, 10 mL, Other, Once, Cammy Bui MD    Allergies:  Allergies   Allergen Reactions     Celebrex [Celecoxib] Rash     patient had butterfly rash - \"lupus-like\"       Latex Rash       ROS:  Pertinent positives as noted in HPI; otherwise 12 point ROS negative.      Physical Exam:  EXAM:  /62 (Patient Site: Left Arm, Patient Position: Sitting, Cuff Size: Adult Regular)   Pulse 79   Resp 16    Gen:  NAD, appears well, well-hydrated  HEENT:  TMs nl, oropharynx benign, nasal mucosa nl, conjunctiva clear  Neck:  Supple, no adenopathy, no thyromegaly, no carotid bruits, no JVD  Lungs:  Clear to auscultation bilaterally  Cor:  RRR no murmur  Abd:  Soft, nontender, BS+, no masses, no guarding or rebound, no HSM  Back:  Multiple trigger points upper back  Extr:  Neg.  Neuro:  No asymmetry  Skin:  Warm/dry        Results:  Results for orders placed or performed in visit on 02/03/20   INR   Result Value Ref Range    INR 1.50 (H) 0.90 - 1.10       Procedure Note - Trigger Point Injections    Consent obtained: Verbal    Indication:  Fibromyalgia; trigger point pain    5 trigger points identified.  Each trigger point swabbed x 3 with Betadine swab.  Each trigger point then injected with 2 ml of 1% Lidocaine (no epi).    Total volume injected:  10 ml    Complications:  None, patient tolerated procedure well.    Plan:  Discussed care with patient.  RTC for further injections in 30 days prn.          "

## 2021-06-06 NOTE — PROGRESS NOTES
[facility] Care Coordination Contact    Received  voice message from member to cancel pull ups and continue the comfort bath wipes.    Called [facility], cancelled order for pull ups and add 1 more wipe package to current order (total of 3) per month in order for them to deliver. SA not needed for wipes, cost is less than $32/month.    Samantha Alexandra RN, PHN   [facility]  [phone]

## 2021-06-06 NOTE — TELEPHONE ENCOUNTER
ANTICOAGULATION  MANAGEMENT PROGRAM    Mary Kay Tejada is overdue for INR check.     Spoke with Mary Kay and patient will have INR checked at next office visit on 3/16.      Danay Mtz RN

## 2021-06-06 NOTE — TELEPHONE ENCOUNTER
ANTICOAGULATION  MANAGEMENT    Assessment     Today's INR result of 2.4 is Therapeutic (goal INR of 2.0-3.0)        Warfarin taken as previously instructed    No new diet changes affecting INR    No new medication/supplements affecting INR    Continues to tolerate warfarin with no reported s/s of bleeding or thromboembolism     Previous INR was Subtherapeutic    Plan:     Spoke with Mary Kay regarding INR result and instructed:     Warfarin Dosing Instructions:  Continue current warfarin dose 2.5 mg daily on Sundays and Thursdays; and 5 mg daily rest of week  (0 % change)    Instructed patient to follow up no later than: two weeks    Education provided: importance of therapeutic range, importance of following up for INR monitoring at instructed interval and importance of taking warfarin as instructed    Mary Kay verbalizes understanding and agrees to warfarin dosing plan.    Instructed to call the [facility] for any changes, questions or concerns. (#[phone])   ?   Mena Dumont RN    Subjective/Objective:      Mary Kay S Terrell, a 70 y.o. female is on warfarin.     Mary Kay reports:     Home warfarin dose: verbally confirmed home dose with Mary Kay and updated on anticoagulation calendar     Missed doses: No     Medication changes:  No     S/S of bleeding or thromboembolism:  No     New Injury or illness:  No     Changes in diet or alcohol consumption:  No     Upcoming surgery, procedure or cardioversion:  No    Anticoagulation Episode Summary     Current INR goal:   2.0-3.0   TTR:   43.6 % (1 y)   Next INR check:   3/2/2020   INR from last check:   2.40 (2/17/2020)   Weekly max warfarin dose:      Target end date:   Indefinite   INR check location:      Preferred lab:      Send INR reminders to:   [facility]    Indications    Paroxysmal Atrial Fibrillation [I48.91]           Comments:            Anticoagulation Care Providers     Provider Role Specialty Phone number    Ramya  MD Cammy [facility] [phone]

## 2021-06-06 NOTE — TELEPHONE ENCOUNTER
Refill Approved    Rx renewed per Medication Renewal Policy. Medication was last renewed on 11/5/19.    Daksha Siu, [facility] Triage/Med Refill 3/6/2020     Requested Prescriptions   Pending Prescriptions Disp Refills     gabapentin (NEURONTIN) 600 MG tablet [Pharmacy Med Name: GABAPENTIN 600MG TABLETS] 90 tablet 3     Sig: TAKE 1 TABLET(600 MG) BY MOUTH THREE TIMES DAILY       Gabapentin/Levetiracetam/Tiagabine Refill Protocol  Passed - 3/6/2020  7:52 AM        Passed - PCP or prescribing provider visit in past 12 months or next 3 months     Last office visit with prescriber/PCP: 2/17/2020 Cammy Bui MD OR same dept: 2/17/2020 Cammy Bui MD OR same specialty: 2/17/2020 Cammy Bui MD  Last physical: 6/3/2019 Last MTM visit: Visit date not found   Next visit within 3 mo: Visit date not found  Next physical within 3 mo: Visit date not found  Prescriber OR PCP: Cammy Bui MD  Last diagnosis associated with med order: 1. Type 2 diabetes mellitus with hypoglycemia without coma, with long-term current use of insulin (H)  - gabapentin (NEURONTIN) 600 MG tablet [Pharmacy Med Name: GABAPENTIN 600MG TABLETS]; TAKE 1 TABLET(600 MG) BY MOUTH THREE TIMES DAILY  Dispense: 90 tablet; Refill: 3    If protocol passes may refill for 12 months if within 3 months of last provider visit (or a total of 15 months).

## 2021-06-07 NOTE — PROGRESS NOTES
ASSESSMENT/PLAN:  1. Fibromyalgia  lidocaine 10 mg/mL (1 %) injection 10 mL   2. Chronic a-fib  INR   3. Trigger point of thoracic region  lidocaine 10 mg/mL (1 %) injection 10 mL   4. Acute bronchitis, unspecified organism  predniSONE (DELTASONE) 10 mg tablet    cefdinir (OMNICEF) 300 MG capsule   5. Chronic pain syndrome  oxyCODONE (ROXICODONE) 10 mg immediate release tablet       This is a 71 yo female with:  1.  Fibromyalgia with trigger point pain - has done well with trigger point injections in past - this keeps her able to decrease the need for more narcotics for treatment (trigger point injections done today - see procedure note).   2.  Chronic atrial fibrillation - check INR today - followed by anticoagulation team  3.  Acute bronchitis - patient is very concerned by the current Covid-19 pandemic - wants readily available steroid/antibiotic to have on hand at home for acute treatment - I did write a prescription for Prednisone and Cefdinir.  We did review symptoms that should lead to urgent treatment.    4.  Chronic pain syndrome - will renew opiates    Return in about 1 month (around 4/16/2020) for Recheck.  Administrations This Visit     lidocaine 10 mg/mL (1 %) injection 10 mL     Admin Date  03/16/2020 Action  Given Dose  10 mL Route  Other Administered By  Yossi Streeter MA                Medications Discontinued During This Encounter   Medication Reason     oxyCODONE (ROXICODONE) 10 mg immediate release tablet Reorder     There are no Patient Instructions on file for this visit.    Chief Complaint:  Chief Complaint   Patient presents with     Medication Refill       HPI:   Mary Kay Tejada is a 70 y.o. female c/o  1.  Due for monthly trigger point injections  Lots of pain in lower neck  2.  Worried about Covid-19 - doesn't want to leave her house - wants something to have on hand    PMH:   Patient Active Problem List    Diagnosis Date Noted     Primary osteoarthritis of both knees 01/17/2020      Mixed stress and urge urinary incontinence 08/16/2019     Skin lesion 07/17/2019     Chronic a-fib      Dyslipidemia      Upper GI bleed 12/13/2018     Melena 12/13/2018     Anemia due to blood loss, acute 07/18/2018     Diabetic polyneuropathy associated with type 2 diabetes mellitus (H) 07/18/2018     Chronic anemia 06/27/2018     Cataract 11/06/2017     Bunion, left 07/19/2017     Callus of foot 07/19/2017     Nonrheumatic aortic valve stenosis 05/23/2017     COPD (chronic obstructive pulmonary disease) (H) 05/23/2017     Chronic obstructive pulmonary disease with acute lower respiratory infection (H) 12/24/2016     Gastroenteritis 12/24/2016     Syncope 12/22/2016     Opacity of lung on imaging study 12/22/2016     Acute gastritis 12/22/2016     Osteoarthritis of both knees 08/22/2016     Osteoarthritis, hip, bilateral 06/20/2016     Primary osteoarthritis of left knee 06/20/2016     Candidal intertrigo 05/11/2016     Type 2 diabetes mellitus with hypoglycemia (H)      Aspiration pneumonia (H) 03/01/2016     Controlled substance agreement signed 11/23/2015     Cervical neck pain with evidence of disc disease 07/22/2015     Headache 07/17/2015     Hematoma of abdominal wall 07/05/2015     Skin lesion of face 05/22/2015     Tendonitis of wrist, right 04/22/2015     Menopause 04/06/2015     Flashers or floaters of right eye 02/23/2015     Tendonitis of wrist, left 01/21/2015     Trigger point of thoracic region 01/21/2015     Generalized osteoarthrosis, involving multiple sites 01/14/2015     Vertigo 12/17/2014     Rotator cuff tendonitis 12/17/2014     Abnormal Weight Loss      Osteoarthritis Of The Shoulder      Chronic Constipation      Onychomycosis Of The Toenails      Diarrhea      Ganglion Of The Left Wrist      Larynx Edema      Obesity      Medial Epicondylitis      Skin Lesion      Urinary Incontinence      Chronic Major Depression      Dry Eye Syndrome      Nicotine Dependence      Hypokalemia       Essential hypertension      Muscle Cramps In The Calf      Edema      Atrial flutter (H)      Lump In / On The Skin      Chronic pain syndrome      Paroxysmal Atrial Fibrillation      Fibromyalgia      Nausea      Abdominal Pain      Peptic Ulcer      COPD exacerbation (H)      Mixed hyperlipidemia      Chronic Reflux Esophagitis      Benign Adenomatous Polyp Of The Large Intestine      Past Medical History:   Diagnosis Date     Anemia      Aortic stenosis      Atrial fibrillation (H)      Atrial flutter (H)      Benign neoplasm of adenomatous polyp     large intestine      Chronic constipation      Chronic pain syndrome      COPD (chronic obstructive pulmonary disease) (H)     Oxygen at night      Dependence on supplemental oxygen     Oxygen at noc, during the day as needed     Depression      Diabetes mellitus (H)      Dry eye syndrome      Fibromyalgia      Ganglion     left wrist     GERD (gastroesophageal reflux disease)      Hyperlipidemia      Hypertension      Hypokalemia      Larynx edema      Lung disease      Malignant Vulvar Neoplasm     Created by Conversion [facility] Annotation: Apr 17 2007  8:24AM - Cammy Bui:  resection per Dr. Alfonso Mane 9/06;  Replacement Utility updated for latest IMO load     Medial epicondylitis      Onychomycosis      Osteoarthritis      Otitis Externa     Created by Conversion      Peptic ulcer      Polyneuropathy      Vulvar malignant neoplasm (H)      Past Surgical History:   Procedure Laterality Date     BREAST BIOPSY Right      BREAST BIOPSY Right 01/28/2015     BREAST BIOPSY Right 1/28/2015    Procedure: RIGHT BREAST BIOPSY AFTER WIRE LOCALIZATION AT 0940;  Surgeon: Renée Soriano MD;  Location: [facility];  Service:      COLONOSCOPY N/A 6/14/2019    Procedure: COLONOSCOPY;  Surgeon: Eduardo Mora MD;  Location: [facility];  Service: Gastroenterology     ESOPHAGOGASTRODUODENOSCOPY N/A 11/6/2018    Procedure:  ESOPHAGOGASTRODUODENOSCOPY;  Surgeon: Lit Fernando MD;  Location: [facility] OR;  Service:      HYSTERECTOMY       JOINT REPLACEMENT Left     TKA     OR ABLATE HEART DYSRHYTHM FOCUS      Description: Catheter Ablation Atrial Fibrillation;  Recorded: 07/31/2012;  Comments: 7/24/12 PVI with Dr. Gardiner and nilay to all 5 pulm veins and CTI fl ablation line as well.     OR BIOPSY OF BREAST, INCISIONAL      Description: Incisional Breast Biopsy;  Recorded: 11/13/2007;  Comments: benign     OR SUPRACERV ABD HYSTERECTOMY      Description: Supracervical Hysterectomy;  Proc Date: 01/01/1985;  Comments: some cervix left!; ovaries intact; done for bleeding     Social History     Socioeconomic History     Marital status:      Spouse name: Not on file     Number of children: Not on file     Years of education: Not on file     Highest education level: Not on file   Occupational History     Not on file   Social Needs     Financial resource strain: Not on file     Food insecurity     Worry: Not on file     Inability: Not on file     Transportation needs     Medical: Not on file     Non-medical: Not on file   Tobacco Use     Smoking status: Current Every Day Smoker     Packs/day: 0.50     Types: Cigarettes     Smokeless tobacco: Never Used   Substance and Sexual Activity     Alcohol use: Yes     Comment: very little     Drug use: No     Sexual activity: Not on file   Lifestyle     Physical activity     Days per week: Not on file     Minutes per session: Not on file     Stress: Not on file   Relationships     Social connections     Talks on phone: Not on file     Gets together: Not on file     Attends Confucianism service: Not on file     Active member of club or organization: Not on file     Attends meetings of clubs or organizations: Not on file     Relationship status: Not on file     Intimate partner violence     Fear of current or ex partner: Not on file     Emotionally abused: Not on file     Physically abused:  "Not on file     Forced sexual activity: Not on file   Other Topics Concern     Not on file   Social History Narrative     Not on file     Family History   Problem Relation Age of Onset     Heart failure Mother      Cancer Other         paternal HX-laryngeal      Alcoholism Sister      No Medical Problems Daughter      No Medical Problems Maternal Grandmother      No Medical Problems Maternal Grandfather      No Medical Problems Paternal Grandmother      No Medical Problems Paternal Grandfather      No Medical Problems Maternal Aunt      No Medical Problems Paternal Aunt      Alcoholism Sister      Alcoholism Brother      Alcohol abuse Father      Cancer Paternal Uncle         Gastric-Alcohol     Cancer Paternal Uncle         gastric-Alcohol     BRCA 1/2 Neg Hx      Breast cancer Neg Hx      Colon cancer Neg Hx      Endometrial cancer Neg Hx      Ovarian cancer Neg Hx        Meds:    Current Outpatient Medications:      ACCU-CHEK SOFTCLIX LANCETS lancets, TEST THREE TIMES DAILY, Disp: 300 each, Rfl: 3     albuterol (PROAIR HFA;PROVENTIL HFA;VENTOLIN HFA) 90 mcg/actuation inhaler, INHALE 2 PUFFS EVERY 4 HOURS AS NEEDED WHEEZING, Disp: 90 g, Rfl: 11     albuterol (PROVENTIL) 2.5 mg /3 mL (0.083 %) nebulizer solution, Take 3 mL (2.5 mg total) by nebulization every 4 (four) hours as needed for wheezing or shortness of breath., Disp: 75 mL, Rfl: 12     atorvastatin (LIPITOR) 20 MG tablet, TAKE 1 TABLET(20 MG) BY MOUTH AT BEDTIME, Disp: 90 tablet, Rfl: 3     BD ULTRA-FINE SHORT PEN NEEDLE 31 gauge x 5/16\" Ndle, TEST FOUR TIMES DAILY WITH MEALS AND AT BEDTIME, Disp: 400 each, Rfl: 3     blood glucose test strips, Use 1 each As Directed 3 (three) times a day as needed (for blood glucose testing). Dispense strips that are for One Touch Verio IQ meter, Disp: 300 strip, Rfl: 3     blood-glucose meter (ONETOUCH VERIO IQ METER) Misc, Check blood sugar three times a day., Disp: 1 each, Rfl: 0     budesonide-formoterol (SYMBICORT) " 160-4.5 mcg/actuation inhaler, Inhale 2 puffs 2 (two) times a day., Disp: 1 Inhaler, Rfl: 2     codeine-guaiFENesin (GUAIFENESIN AC)  mg/5 mL liquid, Take 5 mL by mouth 3 (three) times a day as needed for cough., Disp: 240 mL, Rfl: 0     cyclobenzaprine (FLEXERIL) 10 MG tablet, Take 1 tablet (10 mg total) by mouth at bedtime as needed for muscle spasms., Disp: 30 tablet, Rfl: 0     diaper,brief,adult,disposable (ADULT BRIEFS - LARGE) Misc, Use 3-4 daily as needed for incontinence, Disp: 120 each, Rfl: 6     diltiazem (CARTIA XT) 120 MG 24 hr capsule, Take 1 capsule (120 mg total) by mouth daily., Disp: 90 capsule, Rfl: 3     furosemide (LASIX) 20 MG tablet, Take 1 tablet (20 mg total) by mouth daily as needed., Disp: 90 tablet, Rfl: 3     gabapentin (NEURONTIN) 300 MG capsule, Take 300 mg by mouth 2 (two) times a day. 300 mg two times a day, 600 mg at bedtime   , Disp: , Rfl:      gabapentin (NEURONTIN) 600 MG tablet, TAKE 1 TABLET(600 MG) BY MOUTH THREE TIMES DAILY, Disp: 270 tablet, Rfl: 3     generic lancets (FINGERSTIX LANCETS), Dispense brand per patient's insurance at pharmacy discretion., Disp: 300 each, Rfl: 0     gentamicin (GARAMYCIN) 0.1 % ointment, APPLY TOPICALLY TO WOUND BID, Disp: , Rfl: 0     insulin aspart U-100 (NOVOLOG) 100 unit/mL injection, Inject under the skin 3 (three) times a day before meals. Inject per sliding scale, Disp: , Rfl:      ipratropium-albuterol (DUO-NEB) 0.5-2.5 mg/3 mL nebulizer, INAHALE 1 VIAL IN NEBULIZER EVERY 4 HOURS AS NEEDED, Disp: 1440 mL, Rfl: 0     meclizine (ANTIVERT) 25 mg tablet, TAKE 1 TABLET(25 MG) BY MOUTH THREE TIMES DAILY AS NEEDED FOR DIZZINESS OR NAUSEA, Disp: 45 tablet, Rfl: 5     metFORMIN (GLUCOPHAGE) 500 MG tablet, Take 1 tablet (500 mg total) by mouth 2 (two) times a day with meals., Disp: 180 tablet, Rfl: 3     nystatin (NYSTOP) powder, Apply 1 application topically 2 (two) times a day as needed. 2-3 times to affected area(s)., Disp: 15 g, Rfl:  "3     oxyCODONE (ROXICODONE) 10 mg immediate release tablet, Take 1 tablet (10 mg total) by mouth every 6 (six) hours as needed., Disp: 120 tablet, Rfl: 0     polyethylene glycol (MIRALAX) 17 gram packet, Take 1 packet (17 g total) by mouth daily. (Patient taking differently: Take 17 g by mouth daily as needed.    ), Disp: , Rfl: 0     ranitidine (ZANTAC) 150 MG tablet, TAKE 1 TABLET(150 MG) BY MOUTH TWICE DAILY, Disp: 180 tablet, Rfl: 3     sucralfate (CARAFATE) 1 gram tablet, TAKE 1 TABLET BY MOUTH FOUR TIMES DAILY(CRUSH TABLET AND MIX IT WITH A LITTLE WATER, THEN SWALLOW), Disp: 120 tablet, Rfl: 12     SYMBICORT 160-4.5 mcg/actuation inhaler, INHALE 2 PUFFS BY MOUTH TWICE DAILY, Disp: 1 Inhaler, Rfl: 2     warfarin (COUMADIN/JANTOVEN) 5 MG tablet, Take 2.5 to 5mg (1/2 or 1 tabs) by mouth daily as directed.  Adjust dose based on INR. Take 2.5 mg Monday and 5 mg the rest of the week, to be started in 2 day, hold if dark stool., Disp: , Rfl:      cefdinir (OMNICEF) 300 MG capsule, Take 1 capsule (300 mg total) by mouth 2 (two) times a day for 10 days., Disp: 20 capsule, Rfl: 0     predniSONE (DELTASONE) 10 mg tablet, 6 tabs po daily x 2 d, 5 tabs po daily x 2 d, 4 tabs po daily x 2 d,  3 tabs po daily x 2 d,  2 tabs po daily x 2 d,  1 tab po daily x 2 d., Disp: 10 tablet, Rfl: 0    Current Facility-Administered Medications:      lidocaine 1%-EPINEPHrine 1:100,000 1 %-1:100,000 injection 1 mL (XYLOCAINE W/EPI), 1 mL, Other, Once, Cammy Bui MD     lidocaine 1%-EPINEPHrine 1:100,000 1 %-1:100,000 injection 10 mL (XYLOCAINE W/EPI), 10 mL, Other, Once, Cammy Bui MD     lidocaine 10 mg/mL (1 %) injection 10 mL, 10 mL, Other, Once, Cammy Bui MD    Allergies:  Allergies   Allergen Reactions     Celebrex [Celecoxib] Rash     patient had butterfly rash - \"lupus-like\"       Latex Rash       ROS:  Pertinent positives as noted in HPI; otherwise 12 point ROS " negative.      Physical Exam:  EXAM:  /69 (Patient Site: Right Arm, Patient Position: Sitting, Cuff Size: Adult Regular)   Pulse 85   Resp 16   Wt 165 lb (74.8 kg)   BMI 28.32 kg/m     Gen:  NAD, appears well, well-hydrated  HEENT:  TMs nl, oropharynx benign, nasal mucosa nl, conjunctiva clear  Neck:  Supple, no adenopathy, no thyromegaly, no carotid bruits, no JVD  Lungs:  Clear to auscultation bilaterally  Cor:  RRR no murmur  Abd:  Soft, nontender, BS+, no masses, no guarding or rebound, no HSM  Extr:  Neg.  Back:  Trigger point tenderness R>L upper scapular/lower cervical/upper thoracic  Neuro:  No asymmetry  Skin:  Warm/dry        Results:  Results for orders placed or performed in visit on 03/16/20   INR   Result Value Ref Range    INR 3.80 (H) 0.90 - 1.10       Procedure Note - Trigger Point Injections    Consent obtained: vberbal    Indication:  Pain, trigger point tenderness, fibromyalgia    5 trigger points identified.  Each trigger point swabbed x 3 with Betadine swab.  Each trigger point then injected with 2 ml of 1% Lidocaine (no epi).    Total volume injected:  10 ml    Complications:  None, patient tolerated procedure well.    Plan:  Discussed care with patient.  RTC for further injections in 30 days prn.

## 2021-06-07 NOTE — PROGRESS NOTES
ASSESSMENT/PLAN:  1. Trigger point of thoracic region  lidocaine 10 mg/mL (1 %) injection 10 mL   2. Fibromyalgia     3. Chronic pain syndrome  oxyCODONE (ROXICODONE) 10 mg immediate release tablet   4. Peptic ulcer  famotidine (PEPCID) 20 MG tablet       This is a 71 yo female with:  1.  Fibromyalgia/chronic pain/trigger point pain - she benefits from monthly trigger point injections to decreased her pain in the upper thoracic/lower cervical region - usually right sided dominant pain, but today has nearly equal discomfort both sides; we have chosen symmetrical  Trigger points at base of cervical spine and then in the rhomboids (total 4 injections) and then just right unilateral suprascapular (total 1 injection).  See procedure note.  2.  Chronic Pain syndrome - in addition to her trigger point injections, she has taken Oxycodone x years.  She has not shown signs of abuse of this medication.  She has h/o peptic ulcer disease and cannot take any NSAIDs without problems.  Did have GI bleeding in the last couple years again, so want to be conservative about treating.  Will refill her Oxycodone today  3.  Peptic ulcer disease - still taking Ranitidine - it's not clear how she is still getting this medication (which is off the market).  Will switch to Famotidine.    4.  Depression -   PHQ-9 Total Score: 3 (4/21/2020 10:00 AM)  patient has h/o depression - not significantly active currently - some increased anxiety related to COVID-19 pandemic.      No follow-ups on file.  Administrations This Visit     lidocaine 10 mg/mL (1 %) injection 10 mL     Admin Date  04/21/2020 Action  Given by Other Dose  10 mL Route  Intra-articular Administered By  Amanda Alexandra MA                Medications Discontinued During This Encounter   Medication Reason     oxyCODONE (ROXICODONE) 10 mg immediate release tablet Reorder     There are no Patient Instructions on file for this visit.    Chief Complaint:  Chief Complaint   Patient presents  with     trigger point shots     INR Check       HPI:   Mary Kay Tejada is a 70 y.o. female c/o    Diagnosed with arthritis , age 20  Had scope of stomach for ulcer decades ago as well  Still taking Ranitidine twice a day - will switch to Famotidine today    Patient has pain - all the way down right arm now; but also on left side        PMH:   Patient Active Problem List    Diagnosis Date Noted     Primary osteoarthritis of both knees 01/17/2020     Mixed stress and urge urinary incontinence 08/16/2019     Skin lesion 07/17/2019     Chronic a-fib      Dyslipidemia      Upper GI bleed 12/13/2018     Melena 12/13/2018     Anemia due to blood loss, acute 07/18/2018     Diabetic polyneuropathy associated with type 2 diabetes mellitus (H) 07/18/2018     Chronic anemia 06/27/2018     Cataract 11/06/2017     Bunion, left 07/19/2017     Callus of foot 07/19/2017     Nonrheumatic aortic valve stenosis 05/23/2017     COPD (chronic obstructive pulmonary disease) (H) 05/23/2017     Chronic obstructive pulmonary disease with acute lower respiratory infection (H) 12/24/2016     Gastroenteritis 12/24/2016     Syncope 12/22/2016     Opacity of lung on imaging study 12/22/2016     Acute gastritis 12/22/2016     Osteoarthritis of both knees 08/22/2016     Osteoarthritis, hip, bilateral 06/20/2016     Primary osteoarthritis of left knee 06/20/2016     Candidal intertrigo 05/11/2016     Type 2 diabetes mellitus with hypoglycemia (H)      Aspiration pneumonia (H) 03/01/2016     Controlled substance agreement signed 11/23/2015     Cervical neck pain with evidence of disc disease 07/22/2015     Headache 07/17/2015     Hematoma of abdominal wall 07/05/2015     Skin lesion of face 05/22/2015     Tendonitis of wrist, right 04/22/2015     Menopause 04/06/2015     Flashers or floaters of right eye 02/23/2015     Tendonitis of wrist, left 01/21/2015     Trigger point of thoracic region 01/21/2015     Generalized osteoarthrosis, involving  multiple sites 01/14/2015     Vertigo 12/17/2014     Rotator cuff tendonitis 12/17/2014     Abnormal Weight Loss      Osteoarthritis Of The Shoulder      Chronic Constipation      Onychomycosis Of The Toenails      Diarrhea      Ganglion Of The Left Wrist      Larynx Edema      Obesity      Medial Epicondylitis      Skin Lesion      Urinary Incontinence      Chronic Major Depression      Dry Eye Syndrome      Nicotine Dependence      Hypokalemia      Essential hypertension      Muscle Cramps In The Calf      Edema      Atrial flutter (H)      Lump In / On The Skin      Chronic pain syndrome      Paroxysmal Atrial Fibrillation      Fibromyalgia      Nausea      Abdominal Pain      Peptic Ulcer      COPD exacerbation (H)      Mixed hyperlipidemia      Chronic Reflux Esophagitis      Benign Adenomatous Polyp Of The Large Intestine      Past Medical History:   Diagnosis Date     Anemia      Aortic stenosis      Atrial fibrillation (H)      Atrial flutter (H)      Benign neoplasm of adenomatous polyp     large intestine      Chronic constipation      Chronic pain syndrome      COPD (chronic obstructive pulmonary disease) (H)     Oxygen at night      Dependence on supplemental oxygen     Oxygen at noc, during the day as needed     Depression      Diabetes mellitus (H)      Dry eye syndrome      Fibromyalgia      Ganglion     left wrist     GERD (gastroesophageal reflux disease)      Hyperlipidemia      Hypertension      Hypokalemia      Larynx edema      Lung disease      Malignant Vulvar Neoplasm     Created by Conversion [facility] Annotation: Apr 17 2007  8:24AM - Cammy Bui:  resection per Dr. Alfonso Mane 9/06;  Replacement Utility updated for latest IMO load     Medial epicondylitis      Onychomycosis      Osteoarthritis      Otitis Externa     Created by Conversion      Peptic ulcer      Polyneuropathy      Vulvar malignant neoplasm (H)      Past Surgical History:   Procedure Laterality Date      BREAST BIOPSY Right      BREAST BIOPSY Right 01/28/2015     BREAST BIOPSY Right 1/28/2015    Procedure: RIGHT BREAST BIOPSY AFTER WIRE LOCALIZATION AT 0940;  Surgeon: Renée Soriano MD;  Location: [facility];  Service:      COLONOSCOPY N/A 6/14/2019    Procedure: COLONOSCOPY;  Surgeon: Eduardo Mora MD;  Location: [facility];  Service: Gastroenterology     ESOPHAGOGASTRODUODENOSCOPY N/A 11/6/2018    Procedure: ESOPHAGOGASTRODUODENOSCOPY;  Surgeon: Lit Fernando MD;  Location: [facility];  Service:      HYSTERECTOMY       JOINT REPLACEMENT Left     TKA     NV ABLATE HEART DYSRHYTHM FOCUS      Description: Catheter Ablation Atrial Fibrillation;  Recorded: 07/31/2012;  Comments: 7/24/12 PVI with Dr. Gardiner and nilay to all 5 pulm veins and CTI fl ablation line as well.     NV BIOPSY OF BREAST, INCISIONAL      Description: Incisional Breast Biopsy;  Recorded: 11/13/2007;  Comments: benign     NV SUPRACERV ABD HYSTERECTOMY      Description: Supracervical Hysterectomy;  Proc Date: 01/01/1985;  Comments: some cervix left!; ovaries intact; done for bleeding     Social History     Socioeconomic History     Marital status:      Spouse name: Not on file     Number of children: Not on file     Years of education: Not on file     Highest education level: Not on file   Occupational History     Not on file   Social Needs     Financial resource strain: Not on file     Food insecurity     Worry: Not on file     Inability: Not on file     Transportation needs     Medical: Not on file     Non-medical: Not on file   Tobacco Use     Smoking status: Current Every Day Smoker     Packs/day: 0.50     Types: Cigarettes     Smokeless tobacco: Never Used   Substance and Sexual Activity     Alcohol use: Yes     Comment: very little     Drug use: No     Sexual activity: Not on file   Lifestyle     Physical activity     Days per week: Not on file     Minutes per session: Not on file     Stress: Not on file    Relationships     Social connections     Talks on phone: Not on file     Gets together: Not on file     Attends [employer] service: Not on file     Active member of club or organization: Not on file     Attends meetings of clubs or organizations: Not on file     Relationship status: Not on file     Intimate partner violence     Fear of current or ex partner: Not on file     Emotionally abused: Not on file     Physically abused: Not on file     Forced sexual activity: Not on file   Other Topics Concern     Not on file   Social History Narrative     Not on file     Family History   Problem Relation Age of Onset     Heart failure Mother      Cancer Other         paternal HX-laryngeal      Alcoholism Sister      No Medical Problems Daughter      No Medical Problems Maternal Grandmother      No Medical Problems Maternal Grandfather      No Medical Problems Paternal Grandmother      No Medical Problems Paternal Grandfather      No Medical Problems Maternal Aunt      No Medical Problems Paternal Aunt      Alcoholism Sister      Alcoholism Brother      Alcohol abuse Father      Cancer Paternal Uncle         Gastric-Alcohol     Cancer Paternal Uncle         gastric-Alcohol     BRCA 1/2 Neg Hx      Breast cancer Neg Hx      Colon cancer Neg Hx      Endometrial cancer Neg Hx      Ovarian cancer Neg Hx        Meds:    Current Outpatient Medications:      ACCU-CHEK SOFTCLIX LANCETS lancets, TEST THREE TIMES DAILY, Disp: 300 each, Rfl: 3     albuterol (PROAIR HFA;PROVENTIL HFA;VENTOLIN HFA) 90 mcg/actuation inhaler, INHALE 2 PUFFS EVERY 4 HOURS AS NEEDED WHEEZING, Disp: 90 g, Rfl: 11     albuterol (PROVENTIL) 2.5 mg /3 mL (0.083 %) nebulizer solution, Take 3 mL (2.5 mg total) by nebulization every 4 (four) hours as needed for wheezing or shortness of breath., Disp: 75 mL, Rfl: 12     atorvastatin (LIPITOR) 20 MG tablet, TAKE 1 TABLET(20 MG) BY MOUTH AT BEDTIME, Disp: 90 tablet, Rfl: 3     BD ULTRA-FINE SHORT PEN NEEDLE 31 gauge  "x 5/16\" Ndle, TEST FOUR TIMES DAILY WITH MEALS AND AT BEDTIME, Disp: 400 each, Rfl: 3     blood glucose test strips, Use 1 each As Directed 3 (three) times a day as needed (for blood glucose testing). Dispense strips that are for One Touch Verio IQ meter, Disp: 300 strip, Rfl: 3     blood-glucose meter (ONETOUCH VERIO IQ METER) Misc, Check blood sugar three times a day., Disp: 1 each, Rfl: 0     budesonide-formoterol (SYMBICORT) 160-4.5 mcg/actuation inhaler, Inhale 2 puffs 2 (two) times a day., Disp: 1 Inhaler, Rfl: 2     codeine-guaiFENesin (GUAIFENESIN AC)  mg/5 mL liquid, Take 5 mL by mouth 3 (three) times a day as needed for cough., Disp: 240 mL, Rfl: 0     cyclobenzaprine (FLEXERIL) 10 MG tablet, Take 1 tablet (10 mg total) by mouth at bedtime as needed for muscle spasms., Disp: 30 tablet, Rfl: 0     diaper,brief,adult,disposable (ADULT BRIEFS - LARGE) Misc, Use 3-4 daily as needed for incontinence, Disp: 120 each, Rfl: 6     diltiazem (CARTIA XT) 120 MG 24 hr capsule, Take 1 capsule (120 mg total) by mouth daily., Disp: 90 capsule, Rfl: 3     furosemide (LASIX) 20 MG tablet, Take 1 tablet (20 mg total) by mouth daily as needed., Disp: 90 tablet, Rfl: 3     gabapentin (NEURONTIN) 300 MG capsule, Take 300 mg by mouth 2 (two) times a day. 300 mg two times a day, 600 mg at bedtime   , Disp: , Rfl:      gabapentin (NEURONTIN) 600 MG tablet, TAKE 1 TABLET(600 MG) BY MOUTH THREE TIMES DAILY, Disp: 270 tablet, Rfl: 3     generic lancets (FINGERSTIX LANCETS), Dispense brand per patient's insurance at pharmacy discretion., Disp: 300 each, Rfl: 0     gentamicin (GARAMYCIN) 0.1 % ointment, APPLY TOPICALLY TO WOUND BID, Disp: , Rfl: 0     insulin aspart U-100 (NOVOLOG) 100 unit/mL injection, Inject under the skin 3 (three) times a day before meals. Inject per sliding scale, Disp: , Rfl:      ipratropium-albuterol (DUO-NEB) 0.5-2.5 mg/3 mL nebulizer, INAHALE 1 VIAL IN NEBULIZER EVERY 4 HOURS AS NEEDED, Disp: 1440 mL, " Rfl: 0     meclizine (ANTIVERT) 25 mg tablet, TAKE 1 TABLET(25 MG) BY MOUTH THREE TIMES DAILY AS NEEDED FOR DIZZINESS OR NAUSEA, Disp: 45 tablet, Rfl: 5     metFORMIN (GLUCOPHAGE) 500 MG tablet, Take 1 tablet (500 mg total) by mouth 2 (two) times a day with meals., Disp: 180 tablet, Rfl: 3     nystatin (NYSTOP) powder, Apply 1 application topically 2 (two) times a day as needed. 2-3 times to affected area(s)., Disp: 15 g, Rfl: 3     oxyCODONE (ROXICODONE) 10 mg immediate release tablet, Take 1 tablet (10 mg total) by mouth every 6 (six) hours as needed., Disp: 120 tablet, Rfl: 0     polyethylene glycol (MIRALAX) 17 gram packet, Take 1 packet (17 g total) by mouth daily. (Patient taking differently: Take 17 g by mouth daily as needed.    ), Disp: , Rfl: 0     predniSONE (DELTASONE) 10 mg tablet, 6 tabs po daily x 2 d, 5 tabs po daily x 2 d, 4 tabs po daily x 2 d,  3 tabs po daily x 2 d,  2 tabs po daily x 2 d,  1 tab po daily x 2 d., Disp: 10 tablet, Rfl: 0     ranitidine (ZANTAC) 150 MG tablet, TAKE 1 TABLET(150 MG) BY MOUTH TWICE DAILY, Disp: 180 tablet, Rfl: 3     sucralfate (CARAFATE) 1 gram tablet, TAKE 1 TABLET BY MOUTH FOUR TIMES DAILY(CRUSH TABLET AND MIX IT WITH A LITTLE WATER, THEN SWALLOW), Disp: 120 tablet, Rfl: 12     SYMBICORT 160-4.5 mcg/actuation inhaler, INHALE 2 PUFFS BY MOUTH TWICE DAILY, Disp: 1 Inhaler, Rfl: 2     warfarin (COUMADIN/JANTOVEN) 5 MG tablet, Take 2.5 to 5mg (1/2 or 1 tabs) by mouth daily as directed.  Adjust dose based on INR. Take 2.5 mg Monday and 5 mg the rest of the week, to be started in 2 day, hold if dark stool., Disp: , Rfl:      famotidine (PEPCID) 20 MG tablet, Take 1 tablet (20 mg total) by mouth 2 (two) times a day., Disp: 180 tablet, Rfl: 3    Current Facility-Administered Medications:      lidocaine 1%-EPINEPHrine 1:100,000 1 %-1:100,000 injection 1 mL (XYLOCAINE W/EPI), 1 mL, Other, Once, Cammy Bui MD     lidocaine 1%-EPINEPHrine 1:100,000 1  "%-1:100,000 injection 10 mL (XYLOCAINE W/EPI), 10 mL, Other, Once, Cammy Bui MD     lidocaine 10 mg/mL (1 %) injection 10 mL, 10 mL, Other, Once, Cammy Bui MD    Allergies:  Allergies   Allergen Reactions     Celebrex [Celecoxib] Rash     patient had butterfly rash - \"lupus-like\"       Latex Rash       ROS:  Pertinent positives as noted in HPI; otherwise 12 point ROS negative.      Physical Exam:  EXAM:  /67 (Patient Site: Left Arm, Patient Position: Sitting, Cuff Size: Adult Regular)   Pulse 69   Resp 16   Wt 166 lb (75.3 kg)   BMI 28.49 kg/m     Gen:  NAD, appears well, well-hydrated  HEENT:  TMs nl, oropharynx benign, nasal mucosa nl, conjunctiva clear  Neck:  Supple, no adenopathy, no thyromegaly, no carotid bruits, no JVD  Lungs:  Clear to auscultation bilaterally  Cor:  RRR no murmur  Abd:  Soft, nontender, BS+, no masses, no guarding or rebound, no HSM  Back:  Trigger point tenderness R=L today - see notes above -   Extr:  DJD - knees, hips, hands  Neuro:  No asymmetry  Skin:  Warm/dry        Results:  Results for orders placed or performed in visit on 04/21/20   INR   Result Value Ref Range    INR 1.90 (H) 0.90 - 1.10       Procedure Note - Trigger Point Injections    Consent obtained: verbal    Indication:  Trigger point pain/Fibromyalgia    5 trigger points identified.  Each trigger point swabbed x 3 with Betadine swab.  Each trigger point then injected with 2 ml of 1% Lidocaine (no epi). (2 on left - at lower cervical/mid rhomboid; 3 on right - at lower cervical/mid rhomboid and suprascapular)    Total volume injected:  10 ml    Complications:  None, patient tolerated procedure well.    Plan:  Discussed care with patient.  RTC for further injections in 30 days prn.          "

## 2021-06-07 NOTE — TELEPHONE ENCOUNTER
ANTICOAGULATION  MANAGEMENT    Assessment     Today's INR result of 1.9 is Subtherapeutic (goal INR of 2.0-3.0)        Warfarin taken as previously instructed    No new diet changes affecting INR    No interaction expected between lidocaine injection, famotidine  and warfarin    Continues to tolerate warfarin with no reported s/s of bleeding or thromboembolism     Previous INR was Supratherapeutic    Plan:     Spoke with Mary Kay regarding INR result and instructed:     Warfarin Dosing Instructions:  Take 5 mg tonight,  then continue current warfarin dose 2.5 mg daily on Sundays, Tuesdays, and Thursdays; and 5 mg daily rest of week  (0 % change)    Instructed patient to follow up no later than: 2 weeks     Education provided: importance of therapeutic range, importance of following up for INR monitoring at instructed interval and importance of taking warfarin as instructed    Radha verbalizes understanding and agrees to warfarin dosing plan.    Instructed to call the [facility] for any changes, questions or concerns. (#[phone])   ?   Yuliana Grimes RN    Subjective/Objective:      Mary Kay Tejada, a 70 y.o. female is on warfarin.     Mary Kay reports:     Home warfarin dose: template incorrect; verbally confirmed home dose with Radha  and updated on anticoagulation calendar - left part blank, confirmed dose verbally      Missed doses: No     Medication changes:  Yes, to start famotidine      S/S of bleeding or thromboembolism:  No     New Injury or illness:  No     Changes in diet or alcohol consumption:  No     Upcoming surgery, procedure or cardioversion:  No    Anticoagulation Episode Summary     Current INR goal:   2.0-3.0   TTR:   40.7 % (1 y)   Next INR check:   5/5/2020   INR from last check:   1.90! (4/21/2020)   Weekly max warfarin dose:      Target end date:   Indefinite   INR check location:      Preferred lab:      Send INR reminders to:   [facility]    Indications     Paroxysmal Atrial Fibrillation [I48.91]           Comments:            Anticoagulation Care Providers     Provider Role Specialty Phone number    Cammy Bui MD [facility] [phone]

## 2021-06-07 NOTE — TELEPHONE ENCOUNTER
ANTICOAGULATION  MANAGEMENT PROGRAM    Mary Kay Tejada is overdue for INR check.     Spoke with Mary Kay who states she will think about calling back to schedule an appointment this week. If not, she states she will have INR checked at next office visit on 4/17.      Marija Crawley RN

## 2021-06-07 NOTE — TELEPHONE ENCOUNTER
ANTICOAGULATION  MANAGEMENT    Assessment     Today's INR result of 1.8 is Subtherapeutic (goal INR of 2.0-3.0)        Missed dose(s) may be affecting INR    No new diet changes affecting INR    No new medication/supplements affecting INR    Continues to tolerate warfarin with no reported s/s of bleeding or thromboembolism     Previous INR was Subtherapeutic    Plan:     Spoke with Mary Kay regarding INR result and instructed:     Warfarin Dosing Instructions:  boost dose of warfarin today Mon 5/4 take 7.5mg then continue current warfarin dose    2.5 mg every Sun, Tue, Thu; 5 mg all other days         Instructed patient to follow up no later than: 2 weeks    Education provided: target INR goal and significance of current INR result    Mary Kay verbalizes understanding and agrees to warfarin dosing plan.    Instructed to call the [facility] for any changes, questions or concerns. (#[phone])   ?   Marija Crawley RN    Subjective/Objective:      Mary Kaymarlene Tejada, a 70 y.o. female is on warfarin.     Mary Kay reports:     Home warfarin dose: as updated on anticoagulation calendar per template     Missed doses: Yes: yesterday     Medication changes:  No     S/S of bleeding or thromboembolism:  No     New Injury or illness:  No     Changes in diet or alcohol consumption:  No     Upcoming surgery, procedure or cardioversion:  No    Anticoagulation Episode Summary     Current INR goal:   2.0-3.0   TTR:   38.0 % (1 y)   Next INR check:   5/18/2020   INR from last check:   1.80! (5/4/2020)   Weekly max warfarin dose:      Target end date:   Indefinite   INR check location:      Preferred lab:      Send INR reminders to:   [facility]    Indications    Paroxysmal Atrial Fibrillation [I48.91]           Comments:            Anticoagulation Care Providers     Provider Role Specialty Phone number    Cammy Bui MD Referring Family Medicine [phone]

## 2021-06-08 NOTE — TELEPHONE ENCOUNTER
ANTICOAGULATION  MANAGEMENT    Assessment     Today's INR result of 2.4 is Therapeutic (goal INR of 2.0-3.0)        Warfarin taken as previously instructed    No new diet changes affecting INR    No interaction expected between gabapentin does increase and warfarin    Continues to tolerate warfarin with no reported s/s of bleeding or thromboembolism     Previous INR was Subtherapeutic    Plan:     Spoke with Mary Kay regarding INR result and instructed:     Warfarin Dosing Instructions:  Continue current warfarin dose 2.5 mg daily on Sundays, Tuesdays and Thursdays; and 5 mg daily rest of week  (0 % change)    Instructed patient to follow up no later than: two weeks.    Education provided: importance of therapeutic range, importance of following up for INR monitoring at instructed interval and importance of taking warfarin as instructed    Mary Kay verbalizes understanding and agrees to warfarin dosing plan.    Instructed to call the [facility] for any changes, questions or concerns. (#[phone])   ?   Mena Dumont RN    Subjective/Objective:      Mary Kaydilma Tejada, a 70 y.o. female is on warfarin.     Mary Kay reports:     Home warfarin dose: verbally confirmed home dose with Mary Kay and updated on anticoagulation calendar     Missed doses: No     Medication changes:  Yes: her gabapentin dose increased today.     S/S of bleeding or thromboembolism:  No     New Injury or illness:  No     Changes in diet or alcohol consumption:  No     Upcoming surgery, procedure or cardioversion:  No    Anticoagulation Episode Summary     Current INR goal:   2.0-3.0   TTR:   39.2 % (1 y)   Next INR check:   6/9/2020   INR from last check:   2.40 (5/26/2020)   Weekly max warfarin dose:      Target end date:   Indefinite   INR check location:      Preferred lab:      Send INR reminders to:   [facility]    Indications    Paroxysmal Atrial Fibrillation [I48.91]           Comments:            Anticoagulation  Care Providers     Provider Role Specialty Phone number    Cammy Bui MD Referring Family Medicine [phone]

## 2021-06-08 NOTE — PROGRESS NOTES
ASSESSMENT/PLAN:  1. Fibromyalgia  lidocaine 10 mg/mL (1 %) injection 10 mL    Drugs of Abuse 1,Urine   2. Chronic pain syndrome  oxyCODONE (ROXICODONE) 10 mg immediate release tablet    Drugs of Abuse 1,Urine   3. Tendinitis of left wrist  Wrist/Arm DME: Wrist Brace; Left; non-thumb spica   4. Type 2 diabetes mellitus with hypoglycemia without coma, without long-term current use of insulin (H)  Glycosylated Hemoglobin A1c    Comprehensive Metabolic Panel   5. Chronic anemia     6. Dyslipidemia  Lipid Germfask FASTING       This is a 71 yo female with:  1.  Fibromyalgia - here for trigger point injections.  This has been done monthly - and has limited the need to increase her opiate therapy for chronic pain.  Patient has had remote h/o GI bleed, and more recent h/o GI bleeding - intolerant of NSAIDs for that reason.  And Acetaminophen hasn't been helpful.  She continues to do well on this intervention.  See procedure note below.  2.  Chronic pain syndrome - as above - continue to use lowest dose of opiates as needed.  No increase needed.  3.  Tendinitis of left wrist - likely triggered by raking the yard.  Has tenderness overlying distal radius.  Will treat with wrist splint for comfort.    Return in about 1 month (around 6/26/2020) for trigger point injections, Diabetes, BP Check.  Will be due for labs next visit. These were ordered for next month, as she typically has her blood drawn prior to her being roomed.  Also - would add a urine drug screen next visit.        Medications Discontinued During This Encounter   Medication Reason     oxyCODONE (ROXICODONE) 10 mg immediate release tablet Reorder     predniSONE (DELTASONE) 10 mg tablet Therapy completed     ranitidine (ZANTAC) 150 MG tablet Therapy completed     lidocaine 10 mg/mL (1 %) injection 10 mL      lidocaine 1%-EPINEPHrine 1:100,000 1 %-1:100,000 injection 10 mL (XYLOCAINE W/EPI)      lidocaine 1%-EPINEPHrine 1:100,000 1 %-1:100,000 injection 1 mL  (XYLOCAINE W/EPI)      gabapentin (NEURONTIN) 300 MG capsule Dose adjustment     codeine-guaiFENesin (GUAIFENESIN AC)  mg/5 mL liquid Therapy completed     There are no Patient Instructions on file for this visit.    Chief Complaint:  Chief Complaint   Patient presents with     trigger point shot     INR Check     Medication Refill       HPI:   Mary Kay Tejada is a 70 y.o. female c/o  Here for trigger point shots = for fibromyalgia - gets them monthly  Thought she was gaining weight - would like to lose another 10 pounds    Had INR today - 2.4    Left wrist tendonitis  Tried to  coffee cup - dropped it  Pain at distal radius - side of wrist          PMH:   Patient Active Problem List    Diagnosis Date Noted     Primary osteoarthritis of both knees 01/17/2020     Mixed stress and urge urinary incontinence 08/16/2019     Skin lesion 07/17/2019     Chronic a-fib      Dyslipidemia      Upper GI bleed 12/13/2018     Melena 12/13/2018     Anemia due to blood loss, acute 07/18/2018     Diabetic polyneuropathy associated with type 2 diabetes mellitus (H) 07/18/2018     Chronic anemia 06/27/2018     Cataract 11/06/2017     Bunion, left 07/19/2017     Callus of foot 07/19/2017     Nonrheumatic aortic valve stenosis 05/23/2017     COPD (chronic obstructive pulmonary disease) (H) 05/23/2017     Chronic obstructive pulmonary disease with acute lower respiratory infection (H) 12/24/2016     Gastroenteritis 12/24/2016     Syncope 12/22/2016     Opacity of lung on imaging study 12/22/2016     Acute gastritis 12/22/2016     Osteoarthritis of both knees 08/22/2016     Osteoarthritis, hip, bilateral 06/20/2016     Primary osteoarthritis of left knee 06/20/2016     Candidal intertrigo 05/11/2016     Type 2 diabetes mellitus with hypoglycemia (H)      Aspiration pneumonia (H) 03/01/2016     Controlled substance agreement signed 11/23/2015     Cervical neck pain with evidence of disc disease 07/22/2015     Headache  07/17/2015     Hematoma of abdominal wall 07/05/2015     Skin lesion of face 05/22/2015     Tendonitis of wrist, right 04/22/2015     Menopause 04/06/2015     Flashers or floaters of right eye 02/23/2015     Tendonitis of wrist, left 01/21/2015     Trigger point of thoracic region 01/21/2015     Generalized osteoarthrosis, involving multiple sites 01/14/2015     Vertigo 12/17/2014     Rotator cuff tendonitis 12/17/2014     Abnormal Weight Loss      Osteoarthritis Of The Shoulder      Chronic Constipation      Onychomycosis Of The Toenails      Diarrhea      Ganglion Of The Left Wrist      Larynx Edema      Obesity      Medial Epicondylitis      Skin Lesion      Urinary Incontinence      Chronic Major Depression      Dry Eye Syndrome      Nicotine Dependence      Hypokalemia      Essential hypertension      Muscle Cramps In The Calf      Edema      Atrial flutter (H)      Lump In / On The Skin      Chronic pain syndrome      Paroxysmal Atrial Fibrillation      Fibromyalgia      Nausea      Abdominal Pain      Peptic Ulcer      COPD exacerbation (H)      Mixed hyperlipidemia      Chronic Reflux Esophagitis      Benign Adenomatous Polyp Of The Large Intestine      Past Medical History:   Diagnosis Date     Anemia      Aortic stenosis      Atrial fibrillation (H)      Atrial flutter (H)      Benign neoplasm of adenomatous polyp     large intestine      Chronic constipation      Chronic pain syndrome      COPD (chronic obstructive pulmonary disease) (H)     Oxygen at night      Dependence on supplemental oxygen     Oxygen at noc, during the day as needed     Depression      Diabetes mellitus (H)      Dry eye syndrome      Fibromyalgia      Ganglion     left wrist     GERD (gastroesophageal reflux disease)      Hyperlipidemia      Hypertension      Hypokalemia      Larynx edema      Lung disease      Malignant Vulvar Neoplasm     Created by [facility] Annotation: Apr 17 2007  8:24AM - Ramya  Cammy:  resection per Dr. Alfonso Mane 9/06;  Replacement Utility updated for latest IMO load     Medial epicondylitis      Onychomycosis      Osteoarthritis      Otitis Externa     Created by Conversion      Peptic ulcer      Polyneuropathy      Vulvar malignant neoplasm (H)      Past Surgical History:   Procedure Laterality Date     BREAST BIOPSY Right      BREAST BIOPSY Right 01/28/2015     BREAST BIOPSY Right 1/28/2015    Procedure: RIGHT BREAST BIOPSY AFTER WIRE LOCALIZATION AT 0940;  Surgeon: Renée Soriano MD;  Location: [facility] [place];  Service:      COLONOSCOPY N/A 6/14/2019    Procedure: COLONOSCOPY;  Surgeon: Eduardo Mora MD;  Location: [facility] [place];  Service: Gastroenterology     ESOPHAGOGASTRODUODENOSCOPY N/A 11/6/2018    Procedure: ESOPHAGOGASTRODUODENOSCOPY;  Surgeon: Lit Fernando MD;  Location: [facility] [place];  Service:      HYSTERECTOMY       JOINT REPLACEMENT Left     TKA     UT ABLATE HEART DYSRHYTHM FOCUS      Description: Catheter Ablation Atrial Fibrillation;  Recorded: 07/31/2012;  Comments: 7/24/12 PVI with Dr. Gardiner and nilay to all 5 pulm veins and CTI fl ablation line as well.     UT BIOPSY OF BREAST, INCISIONAL      Description: Incisional Breast Biopsy;  Recorded: 11/13/2007;  Comments: benign     UT SUPRACERV ABD HYSTERECTOMY      Description: Supracervical Hysterectomy;  Proc Date: 01/01/1985;  Comments: some cervix left!; ovaries intact; done for bleeding     Social History     Socioeconomic History     Marital status:      Spouse name: Not on file     Number of children: Not on file     Years of education: Not on file     Highest education level: Not on file   Occupational History     Not on file   Social Needs     Financial resource strain: Not on file     Food insecurity     Worry: Not on file     Inability: Not on file     Transportation needs     Medical: Not on file     Non-medical: Not on file   Tobacco Use     Smoking status: Current Every  Day Smoker     Packs/day: 0.50     Types: Cigarettes     Smokeless tobacco: Never Used   Substance and Sexual Activity     Alcohol use: Yes     Comment: very little     Drug use: No     Sexual activity: Not on file   Lifestyle     Physical activity     Days per week: Not on file     Minutes per session: Not on file     Stress: Not on file   Relationships     Social connections     Talks on phone: Not on file     Gets together: Not on file     Attends [employer] service: Not on file     Active member of club or organization: Not on file     Attends meetings of clubs or organizations: Not on file     Relationship status: Not on file     Intimate partner violence     Fear of current or ex partner: Not on file     Emotionally abused: Not on file     Physically abused: Not on file     Forced sexual activity: Not on file   Other Topics Concern     Not on file   Social History Narrative     Not on file     Family History   Problem Relation Age of Onset     Heart failure Mother      Cancer Other         paternal HX-laryngeal      Alcoholism Sister      No Medical Problems Daughter      No Medical Problems Maternal Grandmother      No Medical Problems Maternal Grandfather      No Medical Problems Paternal Grandmother      No Medical Problems Paternal Grandfather      No Medical Problems Maternal Aunt      No Medical Problems Paternal Aunt      Alcoholism Sister      Alcoholism Brother      Alcohol abuse Father      Cancer Paternal Uncle         Gastric-Alcohol     Cancer Paternal Uncle         gastric-Alcohol     BRCA 1/2 Neg Hx      Breast cancer Neg Hx      Colon cancer Neg Hx      Endometrial cancer Neg Hx      Ovarian cancer Neg Hx        Meds:    Current Outpatient Medications:      ACCU-CHEK SOFTCLIX LANCETS lancets, TEST THREE TIMES DAILY, Disp: 300 each, Rfl: 3     albuterol (PROAIR HFA;PROVENTIL HFA;VENTOLIN HFA) 90 mcg/actuation inhaler, INHALE 2 PUFFS EVERY 4 HOURS AS NEEDED WHEEZING, Disp: 90 g, Rfl: 11      "albuterol (PROVENTIL) 2.5 mg /3 mL (0.083 %) nebulizer solution, Take 3 mL (2.5 mg total) by nebulization every 4 (four) hours as needed for wheezing or shortness of breath., Disp: 75 mL, Rfl: 12     atorvastatin (LIPITOR) 20 MG tablet, TAKE 1 TABLET(20 MG) BY MOUTH AT BEDTIME, Disp: 90 tablet, Rfl: 3     BD ULTRA-FINE SHORT PEN NEEDLE 31 gauge x 5/16\" Ndle, TEST FOUR TIMES DAILY WITH MEALS AND AT BEDTIME, Disp: 400 each, Rfl: 3     blood glucose test strips, Use 1 each As Directed 3 (three) times a day as needed (for blood glucose testing). Dispense strips that are for One Touch Verio IQ meter, Disp: 300 strip, Rfl: 3     blood-glucose meter (ONETOUCH VERIO IQ METER) Misc, Check blood sugar three times a day., Disp: 1 each, Rfl: 0     budesonide-formoteroL (SYMBICORT) 160-4.5 mcg/actuation inhaler, Inhale 2 puffs 2 (two) times a day. Inhale 2 puff by mouth twice daily, Disp: 1 Inhaler, Rfl: 2     cyclobenzaprine (FLEXERIL) 10 MG tablet, Take 1 tablet (10 mg total) by mouth at bedtime as needed for muscle spasms., Disp: 30 tablet, Rfl: 0     diaper,brief,adult,disposable (ADULT BRIEFS - LARGE) Misc, Use 3-4 daily as needed for incontinence, Disp: 120 each, Rfl: 6     diltiazem (CARTIA XT) 120 MG 24 hr capsule, Take 1 capsule (120 mg total) by mouth daily., Disp: 90 capsule, Rfl: 3     famotidine (PEPCID) 20 MG tablet, Take 1 tablet (20 mg total) by mouth 2 (two) times a day., Disp: 180 tablet, Rfl: 3     furosemide (LASIX) 20 MG tablet, Take 1 tablet (20 mg total) by mouth daily as needed., Disp: 90 tablet, Rfl: 3     gabapentin (NEURONTIN) 600 MG tablet, TAKE 1 TABLET(600 MG) BY MOUTH THREE TIMES DAILY, Disp: 270 tablet, Rfl: 3     generic lancets (FINGERSTIX LANCETS), Dispense brand per patient's insurance at pharmacy discretion., Disp: 300 each, Rfl: 0     gentamicin (GARAMYCIN) 0.1 % ointment, APPLY TOPICALLY TO WOUND BID, Disp: , Rfl: 0     insulin aspart U-100 (NOVOLOG) 100 unit/mL injection, Inject under the " "skin 3 (three) times a day before meals. Inject per sliding scale, Disp: , Rfl:      ipratropium-albuterol (DUO-NEB) 0.5-2.5 mg/3 mL nebulizer, INAHALE 1 VIAL IN NEBULIZER EVERY 4 HOURS AS NEEDED, Disp: 1440 mL, Rfl: 0     meclizine (ANTIVERT) 25 mg tablet, TAKE 1 TABLET(25 MG) BY MOUTH THREE TIMES DAILY AS NEEDED FOR DIZZINESS OR NAUSEA, Disp: 45 tablet, Rfl: 5     metFORMIN (GLUCOPHAGE) 500 MG tablet, Take 1 tablet (500 mg total) by mouth 2 (two) times a day with meals., Disp: 180 tablet, Rfl: 3     mometasone-formoterol (DULERA) 200-5 mcg/actuation HFAA inhaler, Inhale 2 puffs 2 (two) times a day., Disp: 1 Inhaler, Rfl: 5     nystatin (NYSTOP) powder, Apply 1 application topically 2 (two) times a day as needed. 2-3 times to affected area(s)., Disp: 15 g, Rfl: 3     oxyCODONE (ROXICODONE) 10 mg immediate release tablet, Take 1 tablet (10 mg total) by mouth every 6 (six) hours as needed., Disp: 120 tablet, Rfl: 0     polyethylene glycol (MIRALAX) 17 gram packet, Take 1 packet (17 g total) by mouth daily. (Patient taking differently: Take 17 g by mouth daily as needed.    ), Disp: , Rfl: 0     sucralfate (CARAFATE) 1 gram tablet, TAKE 1 TABLET BY MOUTH FOUR TIMES DAILY(CRUSH TABLET AND MIX IT WITH A LITTLE WATER, THEN SWALLOW), Disp: 120 tablet, Rfl: 12     warfarin (COUMADIN/JANTOVEN) 5 MG tablet, Take 2.5 to 5mg (1/2 or 1 tabs) by mouth daily as directed.  Adjust dose based on INR. Take 2.5 mg Monday and 5 mg the rest of the week, to be started in 2 day, hold if dark stool., Disp: , Rfl:     Current Facility-Administered Medications:      lidocaine 10 mg/mL (1 %) injection 10 mL, 10 mL, Other, Once, Cammy Bui MD    Allergies:  Allergies   Allergen Reactions     Celebrex [Celecoxib] Rash     patient had butterfly rash - \"lupus-like\"       Latex Rash       ROS:  Pertinent positives as noted in HPI; otherwise 12 point ROS negative.      Physical Exam:  EXAM:  /75 (Patient Site: Left Arm, " Patient Position: Semi-long, Cuff Size: Adult Regular)   Pulse 81   Temp 97.5  F (36.4  C)   Resp 14   Wt 166 lb (75.3 kg)   BMI 28.49 kg/m     Gen:  NAD, appears well, well-hydrated  HEENT:  TMs nl, oropharynx benign, nasal mucosa nl, conjunctiva clear  Neck:  Supple, no adenopathy, no thyromegaly, no carotid bruits, no JVD  Lungs:  Clear to auscultation bilaterally  Cor:  RRR no murmur  Abd:  Soft, nontender, BS+, no masses, no guarding or rebound, no HSM  Extr:  Neg.  Back:  Multiple trigger points on shoulders -  Upper back;   Neuro:  No asymmetry  Skin:  Warm/dry        Results:  Results for orders placed or performed in visit on 05/04/20   INR   Result Value Ref Range    INR 1.80 (H) 0.90 - 1.10         Procedure Note - Trigger Point Injections    Consent obtained: verbal    Indication:  Pain from fibromyalgia/ trigger point injections    5 trigger points identified.  Each trigger point swabbed x 3 with Betadine swab.  Each trigger point then injected with 2 ml of 1% Lidocaine (no epi).    Total volume injected:  10 ml    Complications:  None, patient tolerated procedure well.    Plan:  Discussed care with patient.  RTC for further injections in 30 days prn.

## 2021-06-08 NOTE — TELEPHONE ENCOUNTER
Patient had appointment with PCP earlier this date and needs to make follow up appointment for either 6/22/20 or 6/23/20, per PCP, for treatment for fibromyalgia.  Connected patient with scheduling.    Emma Schaefer RN  [facility] Triage Nurse Advisor

## 2021-06-08 NOTE — TELEPHONE ENCOUNTER
ANTICOAGULATION  MANAGEMENT PROGRAM    Mary Kay Tejada is overdue for INR check.     Spoke with Mary Kay and patient will have INR checked at next office visit on 5/26.      Marija Crawley RN

## 2021-06-08 NOTE — PROGRESS NOTES
"ASSESSMENT / PLAN:  1. Visit for suture removal       This is a 66-year-old female, seen today for suture removal in her right wrist.  Please refer to our previous visit on 12/28/16, where we removed a lump from her right dorsal wrist by elliptical excision.  Sutures were placed and patient presents today for removal.  5 sutures were easily removed, there is good wound healing.  I attempted to place Steri-Strips overlying the wound to give additional degree of support.  These should be allowed to fall off naturally.  She will return if symptoms do not improve.  We reviewed pathology report as noted.    CC:  Suture removal    HPI:    65 yo female with large, scaly, growing lesion right wrist  Had elliptical excision on 12/28/16  Here for suture removal, review of pathology  No problems with healing    EXAM:  Visit Vitals     /64 (Patient Site: Left Arm, Patient Position: Sitting, Cuff Size: Adult Large)     Pulse 80     Temp 97.6  F (36.4  C) (Oral)     Resp 20      Gen:  NAD, appears well, well-hydrated  Skin:  Warm/dry, healing wound right dorsal wrist - 5 sutures intact - removed easily ; good wound edge approximation with good wound healing  Benzoin applied to skin and steri strips applied - these did not stick well due to hair; area covered with bandage  Instructions given to patient to keep area clean/dry, allow time for healing      Final Diagnosis   SKIN, RIGHT WRIST, BIOPSY     -  IRRITATED ACTINIC KERATOSIS INVOLVING VERRUCA VULGARIS   Electronically signed by Kings Hernandez MD on 12/29/2016 at 1333   Comment  The clinical history has been reviewed.  Some areas resemble a seborrheic keratosis. The lesion is excised. Clinical correlation is suggested.      Clinical Information  Clinical history: Crusted lesion right dorsal wrist   Reason for procedure: Pain, increasing size of lesion   Time placed in formalin: 0815      Gross Description  The specimen is labeled with the patient's name and \"right " "wrist,\" is received in formalin, and consists of an elliptical biopsy of nodular, thickened, white, hair-bearing skin, 1.6 x 0.8 x 0.4 cm. The margin is inked blue throughout. The specimen is unoriented. SS&TE-1C CTM:savannah            "

## 2021-06-08 NOTE — TELEPHONE ENCOUNTER
New Appointment Needed  What is the reason for the visit:    Trigger point injections  Provider Preference: PCP only  How soon do you need to be seen?: on  6/22/20 or 6/23/20.  I was told by Cammy Bui MD to schedule this and that she was taking off the 1st and 2nd week in June. I am requesting to be worked in as the next available appointment isn't until 7/6/20 and this is too far out.   Waitlist offered?: no  Okay to leave a detailed message:  Yes

## 2021-06-08 NOTE — TELEPHONE ENCOUNTER
ANTICOAGULATION  MANAGEMENT PROGRAM    Mary Kay Tejada is overdue for INR check.     Spoke with Mary Kay and patient will have INR checked at next office visit on 6/22/20.      Mena Dumont RN

## 2021-06-08 NOTE — PROGRESS NOTES
ASSESSMENT/PLAN:  1. Trigger point of thoracic region  lidocaine 10 mg/mL (1 %) injection 1 mL   2. Chronic pain syndrome  Oxycodone HCl 10 mg Tab   3. Osteoarthritis of shoulder     4. Chronic obstructive pulmonary disease with acute lower respiratory infection  codeine-guaiFENesin (GUAIFENESIN AC)  mg/5 mL liquid   5. Bronchitis         This is a 67-year-old female, seen today for evaluation of trigger point pain.  She comes in for injections which we have been doing on a nearly every month cycle.  She benefits from this, and this keeps down the amount of opiates that she is using for her pain syndrome.  She generally has more symptoms right versus left, and 5 trigger points were identified in the right suprascapular and right paracervical spinal muscles.  Please further procedure note below.  She does continue to take the oxycodone regularly, but again no increase in requests for this medication or usage of the medication.    Patient has underlying type 2 diabetes, currently using sliding scale insulin coverage.  She can't recall the exact sliding scale, and lost her printed off.  She wishes a printed copy of her sliding scale dosing.  I have reviewed previous hospitalization, and recommendations for the sliding scale, and have written for this today.    Patient has what appears to be COPD with acute lower respiratory infection.  This does not appear to be indicative of underlying bacterial focus, and no antibiotics would be necessary currently.  We will treat with cough medication.      Administrations This Visit     lidocaine 10 mg/mL (1 %) injection 1 mL     Admin Date Action Dose Route Administered By             01/20/2017 Given 1 mL Miguel Brown CMA                          Medications Discontinued During This Encounter   Medication Reason     Oxycodone HCl 10 mg Tab Reorder     codeine-guaiFENesin (GUAIFENESIN AC)  mg/5 mL liquid Reorder     Patient Instructions   Insulin  (NOVOLOG  FLEXPEN) 100 unit/mL :  Sliding scale four times a day (meals/bedtime):   -180, 2 units  -220, 4 units   -260, 6 units   -300, 8 units   -340, 10 units   BG >340, 12 units      Chief Complaint:  Chief Complaint   Patient presents with     Injections     trigger point injections       HPI:   Mary Kay Tejada is a 67 y.o. female c/o  1.  Needs insulin sliding scale - lost her copy of this  2.  Needs trigger point injections  3.  Coughing - breathing is getting worse  Couldn't talk yesterday  No fever, no chills      PMH:   Patient Active Problem List    Diagnosis Date Noted     Chronic obstructive pulmonary disease with acute lower respiratory infection 12/24/2016     Gastroenteritis 12/24/2016     Syncope 12/22/2016     Opacity of lung on imaging study 12/22/2016     Acute gastritis 12/22/2016     Osteoarthritis of right knee 08/22/2016     Primary osteoarthritis of left hip 06/20/2016     Primary osteoarthritis of left knee 06/20/2016     Candidal intertrigo 05/11/2016     Type 2 diabetes mellitus with hypoglycemia      Aspiration pneumonia 03/01/2016     Controlled substance agreement signed 11/23/2015     Cervical neck pain with evidence of disc disease 07/22/2015     Headache 07/17/2015     Hematoma of abdominal wall 07/05/2015     Skin lesion of face 05/22/2015     Tendonitis of wrist, right 04/22/2015     Menopause 04/06/2015     Flashers or floaters of right eye 02/23/2015     Tendonitis of wrist, left 01/21/2015     Trigger point of thoracic region 01/21/2015     Generalized osteoarthrosis, involving multiple sites 01/14/2015     Vertigo 12/17/2014     Rotator cuff tendonitis 12/17/2014     Aortic Stenosis      Abnormal Weight Loss      Osteoarthritis Of The Shoulder      Chronic Constipation      Onychomycosis Of The Toenails      Diarrhea      Ganglion Of The Left Wrist      Larynx Edema      Obesity      Malignant Vulvar Neoplasm      Medial Epicondylitis      Skin Lesion       Urinary Incontinence      Chronic Major Depression      Dry Eye Syndrome      Nicotine Dependence      Hypokalemia      Essential hypertension      Muscle Cramps In The Calf      Edema      Atrial flutter      Lump In / On The Skin      Type 2 diabetes mellitus with hyperglycemia      Chronic pain syndrome      Paroxysmal Atrial Fibrillation      Fibromyalgia      Nausea      Abdominal Pain      Peptic Ulcer      COPD exacerbation      Mixed hyperlipidemia      Chronic Reflux Esophagitis      Benign Adenomatous Polyp Of The Large Intestine      Past Medical History   Diagnosis Date     Aortic stenosis      Atrial fibrillation      Atrial flutter      Benign neoplasm of adenomatous polyp      large intestine      Chronic constipation      Chronic pain syndrome      COPD (chronic obstructive pulmonary disease)      Oxygen at night      Depression      Diabetes mellitus      Dry eye syndrome      Fibromyalgia      Ganglion      left wrist     GERD (gastroesophageal reflux disease)      Hyperlipidemia      Hypertension      Hypokalemia      Larynx edema      Lung disease      Medial epicondylitis      Onychomycosis      Osteoarthritis      Otitis Externa      Created by Conversion      Peptic ulcer      Type 2 diabetes mellitus      Vulvar malignant neoplasm      Past Surgical History   Procedure Laterality Date     Pr biopsy of breast, incisional       Description: Incisional Breast Biopsy;  Recorded: 11/13/2007;  Comments: benign     Pr total knee arthroplasty       Description: Total Knee Arthroplasty;  Recorded: 11/13/2007;     Pr supracerv abd hysterectomy       Description: Supracervical Hysterectomy;  Proc Date: 01/01/1985;  Comments: some cervix left!; ovaries intact; done for bleeding     Pr ablate heart dysrhythm focus       Description: Catheter Ablation Atrial Fibrillation;  Recorded: 07/31/2012;  Comments: 7/24/12 PVI with Dr. Gardiner and nilay to all 5 pulm veins and CTI fl ablation line as well.     Pr  total abdom hysterectomy       Description: Total Abdominal Hysterectomy;  Recorded: 12/31/2013;     Breast biopsy Right      Hysterectomy       Breast biopsy Right 01/28/2015     Breast biopsy Right 1/28/2015     Procedure: RIGHT BREAST BIOPSY AFTER WIRE LOCALIZATION AT 0940;  Surgeon: Renée Soriano MD;  Location: [facility];  Service:      Social History     Social History     Marital status:      Spouse name: N/A     Number of children: N/A     Years of education: N/A     Occupational History     Not on file.     Social History Main Topics     Smoking status: Light Tobacco Smoker     Types: Cigarettes     Last attempt to quit: 1/1/2014     Smokeless tobacco: Never Used      Comment: E-cig some day     Alcohol use Yes      Comment: very little     Drug use: No     Sexual activity: Not on file     Other Topics Concern     Not on file     Social History Narrative       Meds:    Current Outpatient Prescriptions:      albuterol (PROVENTIL) 2.5 mg /3 mL (0.083 %) nebulizer solution, Take 3 mL (2.5 mg total) by nebulization every 4 (four) hours as needed for wheezing or shortness of breath., Disp: 75 mL, Rfl: 12     albuterol (VENTOLIN HFA) 90 mcg/actuation inhaler, Inhale 1-2 puffs every 4 (four) hours as needed. Every 4-6 hours as needed (Patient taking differently: Inhale 1-2 puffs every 4 (four) hours as needed for wheezing or shortness of breath. Inhale 1-2 puffs every 4 (four) hours as needed. Every 4-6 hours as needed), Disp: 1 Inhaler, Rfl: 5     aspirin 81 MG EC tablet, Take 81 mg by mouth daily., Disp: , Rfl:      atorvastatin (LIPITOR) 20 MG tablet, Take 1 tablet (20 mg total) by mouth bedtime., Disp: 90 tablet, Rfl: 3     blood glucose meter (GLUCOMETER), Use 1 each As Directed as needed. Dispense glucometer brand per patient's insurance at pharmacy discretion., Disp: 1 each, Rfl: 0     blood glucose test strips, Use 1 each As Directed as needed. Dispense brand per patient's insurance at  pharmacy discretion., Disp: 100 each, Rfl: 11     blood glucose test strips, Use 1 each As Directed as needed. Dispense brand per patient's insurance at pharmacy discretion., Disp: 100 each, Rfl: 11     budesonide-formoterol (SYMBICORT) 160-4.5 mcg/actuation inhaler, Inhale 2 puffs 2 (two) times a day., Disp: 1 Inhaler, Rfl: 2     CARTIA  mg 24 hr capsule, TAKE 1 CAPSULE BY MOUTH EVERY DAY, Disp: 30 capsule, Rfl: 0     DAILY-FAYE tablet, TAKE 1 TABLET BY MOUTH EVERY DAY, Disp: 90 tablet, Rfl: 3     diltiazem (CARTIA XT) 120 MG 24 hr capsule, Take 120 mg by mouth daily., Disp: , Rfl:      escitalopram oxalate (LEXAPRO) 10 MG tablet, Take 10 mg by mouth daily., Disp: , Rfl:      escitalopram oxalate (LEXAPRO) 10 MG tablet, TAKE 1 TABLET BY MOUTH EVERY DAY, Disp: 30 tablet, Rfl: 0     estradiol (ESTRACE) 2 MG tablet, Take 2 mg by mouth daily., Disp: , Rfl:      furosemide (LASIX) 20 MG tablet, Take 20 mg by mouth daily as needed., Disp: , Rfl:      generic lancets, Use 1 each As Directed 3 (three) times a day. Accu-Chek Soft Touch, Disp: 100 each, Rfl: 11     insulin aspart (NOVOLOG FLEXPEN) 100 unit/mL injection pen, Sliding scale QID (meals/bedtime): -180, 2 units; -220, 4 u; -260, 6 u; -300, 8 u; -340, 10 u; BG >340, 12 u, Disp: 3 mL, Rfl: 3     insulin aspart (NOVOLOG FLEXPEN) 100 unit/mL injection pen, Sliding scale QID (meals/bedtime): -180, 2 units; -220, 4 u; -260, 6 u; -300, 8 u; -340, 10 u; BG >340, 12 u, Disp: 5 Pre-filled Pen Syringe, Rfl: 0     ipratropium-albuterol (DUO-NEB) 0.5-2.5 mg/3 mL nebulizer, Take 3 mL by nebulization every 4 (four) hours as needed. As directed., Disp: , Rfl:      leg brace (ELASTIC KNEE SUPPORT) Misc, Use 1 each As Directed daily., Disp: , Rfl: 0     loperamide (IMODIUM) 2 mg capsule, Take 2 capsules by mouth initially followed by 1 capsule after each loose stool bowel movement. Do not exceed 8 capsules per day  "(Patient taking differently: Take 2-4 mg by mouth see administration instructions. Take 2 capsules by mouth initially followed by 1 capsule after each loose stool bowel movement. Do not exceed 8 capsules per day), Disp: 30 capsule, Rfl: 1     meclizine (ANTIVERT) 25 mg tablet, Take 1 tablet (25 mg total) by mouth 3 (three) times a day as needed for dizziness or nausea., Disp: 30 tablet, Rfl: 1     metFORMIN (GLUCOPHAGE) 500 MG tablet, Take 1 tablet (500 mg total) by mouth 2 (two) times a day with meals., Disp: 90 tablet, Rfl: 2     MULTIVITAMIN (DAILY-FAYE ORAL), Take 1 tablet by mouth daily., Disp: , Rfl:      MULTIVITAMIN WITH MINERALS (HAIR,SKIN AND NAILS ORAL), Take 3 tablets by mouth daily. Chewable formulation., Disp: , Rfl:      NEBULIZER/COMPRESSOR (NEBULIZER AND COMPRESSOR MISC), Use As Directed. Use UTD, Disp: , Rfl:      nystatin (NYSTOP) powder, Apply 1 application topically 2 (two) times a day as needed. 2-3 times to affected area(s)., Disp: 15 g, Rfl: 3     olopatadine (PATANOL) 0.1 % ophthalmic solution, Administer 1 drop to both eyes 2 (two) times a day as needed for allergies. , Disp: , Rfl:      omeprazole (PRILOSEC) 20 MG capsule, Take 1 capsule (20 mg total) by mouth 2 (two) times a day., Disp: 60 capsule, Rfl: 0     Oxycodone HCl 10 mg Tab, Take 10 mg by mouth every 6 (six) hours as needed., Disp: 120 each, Rfl: 0     pen needle, diabetic 31 gauge x 5/16\" Ndle, Use 1 each As Directed 4 (four) times a day with meals and bedtime., Disp: 300 each, Rfl: 0     ranitidine (ZANTAC) 150 MG tablet, Take 150 mg by mouth 2 (two) times a day., Disp: , Rfl:      senna-docusate (PERICOLACE) 8.6-50 mg tablet, Take 2 tablets by mouth daily as needed for constipation., Disp: 30 tablet, Rfl: 1     UNABLE TO FIND, Take 1 capsule by mouth daily. Med Name: Probiotic with green tea., Disp: , Rfl:      warfarin (COUMADIN) 5 MG tablet, Take by mouth See Admin Instructions. 2.5 mg (half of a tablet) two days weekly (on " "Tuesdays and Fridays) and 5 mg (one tablet) five days weekly (on Sundays, Mondays, Wednesdays, Thursdays, and Saturdays). Adjust dose based on INR results as directed., Disp: , Rfl:      codeine-guaiFENesin (GUAIFENESIN AC)  mg/5 mL liquid, Take 5-10 mL by mouth every 6 (six) hours as needed for cough., Disp: 240 mL, Rfl: 0     predniSONE (DELTASONE) 10 MG tablet, 6 tabs po daily x 2 d, 5 tabs po daily x 2 d, 4 tabs po daily x 2 d,  3 tabs po daily x 2 d,  2 tabs po daily x 2 d,  1 tab po daily x 2 d, Disp: 42 tablet, Rfl: 0    Allergies:  Allergies   Allergen Reactions     Celebrex [Celecoxib] Rash     patient had butterfly rash - \"lupus-like\"       Latex Rash       ROS:  Pertinent positives as noted in HPI; otherwise 12 point ROS negative.      Physical Exam:  EXAM:  Visit Vitals     /60     Pulse 88     Temp 98.5  F (36.9  C) (Oral)     Resp 14      Gen:  NAD, appears well, well-hydrated  HEENT:  TMs nl, oropharynx benign, nasal mucosa nl, conjunctiva clear  Neck:  Supple, no adenopathy, no thyromegaly, no carotid bruits, no JVD  Lungs:  Coarse end exp wheezing; occ cough  Cor:  RRR no murmur  Abd:  Soft, nontender, BS+, no masses, no guarding or rebound, no HSM  Back:  Trigger points upper back aaliyah R>L at suprascapular region as well as paracervical muscle  Extr:  Neg.  Neuro:  No asymmetry  Skin:  Warm/dry      Procedure Note - Trigger Point Injections    Consent obtained: verbal    Indication:  Fibromyalgia - trigger points    5 trigger points identified.  Each trigger point swabbed x 3 with Betadine swab.  Each trigger point then injected with 2 ml of 1% Lidocaine (no epi).    Total volume injected:  10 ml    Complications:  None, patient tolerated procedure well.    Plan:  Discussed care with patient.  RTC for further injections in 30 days prn.      "

## 2021-06-09 ENCOUNTER — RECORDS - HEALTHEAST (OUTPATIENT)
Dept: ADMINISTRATIVE | Facility: CLINIC | Age: 71
End: 2021-06-09

## 2021-06-09 NOTE — TELEPHONE ENCOUNTER
Test Results  Who is calling?:  Patient  Who ordered the test:  Cammy Bui MD  Type of test: Lab  Date of test:  06/22/2020  Where was the test performed:  In clinic  What are your questions/concerns?:  Pt called wanting results of lab work.  Pt unable to access the [facility]. Writer relayed message from provider that was able to be viewed: No sign of hepatitis C.   Your drug screen doesn't show any opiates, but does show marijuana.  This is troublesome.  We need to discuss this further when you come back next month.   Pt agrees, understands.  Writer transferred Pt to [facility].  Okay to leave a detailed message?:  No

## 2021-06-09 NOTE — PROGRESS NOTES
"ASSESSMENT/PLAN:  1. Trigger point of thoracic region  lidocaine 10 mg/mL (1 %) injection 10 mL   2. Chronic a-fib (H)  INR   3. Localized edema  furosemide (LASIX) 20 MG tablet   4. Chronic pain syndrome  oxyCODONE (ROXICODONE) 10 mg immediate release tablet   5. Candidal intertrigo  nystatin (NYSTOP) powder       This is a 69 yo female here for :  1.  Trigger point pain related to fibromyalgia - here for trigger point injections - these keep the amount of opiate use to a minimum during the month - see procedure note    No follow-ups on file.  Administrations This Visit     lidocaine 10 mg/mL (1 %) injection 10 mL     Admin Date  07/21/2020 Action  Given by Other Dose  10 mL Route  Intra-articular Administered By  Amanda Alexandra MA            2.  Chronic atrial fibrillation - on warfarin therapy - check INR  3.  Localized LE edema - renew Furosemide   4.  Chronic Pain Syndrome - reviewed Oxycodone use - sometimes takes several /day, sometimes doesn't take any - notes that by end of month, often has run out of them  5.  Candidal intertrigo - has red \"rash\" in skin creases    Medications Discontinued During This Encounter   Medication Reason     furosemide (LASIX) 20 MG tablet Reorder     oxyCODONE (ROXICODONE) 10 mg immediate release tablet Reorder     nystatin (NYSTOP) powder Reorder     There are no Patient Instructions on file for this visit.    Chief Complaint:  Chief Complaint   Patient presents with     trigger point shot     Left wrist pain       HPI:   Mary Kay Tejada is a 70 y.o. female c/o  Takes regular doses of her opiate - often runs out 4-10 days prior to coming back in   Will double doses at bedtime -   Fills #120 / month -   Sometimes takes 4/day   Has been taking it a long, long time      PMH:   Patient Active Problem List    Diagnosis Date Noted     Primary osteoarthritis of both knees 01/17/2020     Mixed stress and urge urinary incontinence 08/16/2019     Skin lesion 07/17/2019     Chronic a-fib " (H)      Dyslipidemia      Upper GI bleed 12/13/2018     Melena 12/13/2018     Anemia due to blood loss, acute 07/18/2018     Diabetic polyneuropathy associated with type 2 diabetes mellitus (H) 07/18/2018     Chronic anemia 06/27/2018     Cataract 11/06/2017     Bunion, left 07/19/2017     Callus of foot 07/19/2017     Nonrheumatic aortic valve stenosis 05/23/2017     COPD (chronic obstructive pulmonary disease) (H) 05/23/2017     Chronic obstructive pulmonary disease with acute lower respiratory infection (H) 12/24/2016     Gastroenteritis 12/24/2016     Syncope 12/22/2016     Opacity of lung on imaging study 12/22/2016     Acute gastritis 12/22/2016     Osteoarthritis of both knees 08/22/2016     Osteoarthritis, hip, bilateral 06/20/2016     Primary osteoarthritis of left knee 06/20/2016     Candidal intertrigo 05/11/2016     Type 2 diabetes mellitus with hypoglycemia (H)      Aspiration pneumonia (H) 03/01/2016     Controlled substance agreement signed 11/23/2015     Cervical neck pain with evidence of disc disease 07/22/2015     Headache 07/17/2015     Hematoma of abdominal wall 07/05/2015     Skin lesion of face 05/22/2015     Tendonitis of wrist, right 04/22/2015     Menopause 04/06/2015     Flashers or floaters of right eye 02/23/2015     Tendonitis of wrist, left 01/21/2015     Trigger point of thoracic region 01/21/2015     Generalized osteoarthrosis, involving multiple sites 01/14/2015     Vertigo 12/17/2014     Rotator cuff tendonitis 12/17/2014     Abnormal Weight Loss      Osteoarthritis Of The Shoulder      Chronic Constipation      Onychomycosis Of The Toenails      Diarrhea      Ganglion Of The Left Wrist      Larynx Edema      Obesity      Medial Epicondylitis      Skin Lesion      Urinary Incontinence      Chronic Major Depression      Dry Eye Syndrome      Nicotine Dependence      Hypokalemia      Essential hypertension      Muscle Cramps In The Calf      Edema      Atrial flutter (H)      Lump  In / On The Skin      Chronic pain syndrome      Paroxysmal Atrial Fibrillation      Fibromyalgia      Nausea      Abdominal Pain      Peptic Ulcer      COPD exacerbation (H)      Mixed hyperlipidemia      Chronic Reflux Esophagitis      Benign Adenomatous Polyp Of The Large Intestine      Past Medical History:   Diagnosis Date     Anemia      Aortic stenosis      Atrial fibrillation (H)      Atrial flutter (H)      Benign neoplasm of adenomatous polyp     large intestine      Chronic constipation      Chronic pain syndrome      COPD (chronic obstructive pulmonary disease) (H)     Oxygen at night      Dependence on supplemental oxygen     Oxygen at noc, during the day as needed     Depression      Diabetes mellitus (H)      Dry eye syndrome      Fibromyalgia      Ganglion     left wrist     GERD (gastroesophageal reflux disease)      Hyperlipidemia      Hypertension      Hypokalemia      Larynx edema      Lung disease      Malignant Vulvar Neoplasm     Created by Conversion [facility] Annotation: Apr 17 2007  8:24AM - Cammy Bui:  resection per Dr. Alfonso Mane 9/06;  Replacement Utility updated for latest IMO load     Medial epicondylitis      Onychomycosis      Osteoarthritis      Otitis Externa     Created by Conversion      Peptic ulcer      Polyneuropathy      Vulvar malignant neoplasm (H)      Past Surgical History:   Procedure Laterality Date     BREAST BIOPSY Right      BREAST BIOPSY Right 01/28/2015     BREAST BIOPSY Right 1/28/2015    Procedure: RIGHT BREAST BIOPSY AFTER WIRE LOCALIZATION AT 0940;  Surgeon: Renée Soriano MD;  Location: [facility];  Service:      COLONOSCOPY N/A 6/14/2019    Procedure: COLONOSCOPY;  Surgeon: Eduardo Mora MD;  Location: [facility];  Service: Gastroenterology     ESOPHAGOGASTRODUODENOSCOPY N/A 11/6/2018    Procedure: ESOPHAGOGASTRODUODENOSCOPY;  Surgeon: Lit Fernando MD;  Location: [facility];  Service:      HYSTERECTOMY        JOINT REPLACEMENT Left     TKA     WI ABLATE HEART DYSRHYTHM FOCUS      Description: Catheter Ablation Atrial Fibrillation;  Recorded: 07/31/2012;  Comments: 7/24/12 PVI with Dr. Gardiner and nilay to all 5 pulm veins and CTI fl ablation line as well.     WI BIOPSY OF BREAST, INCISIONAL      Description: Incisional Breast Biopsy;  Recorded: 11/13/2007;  Comments: benign     WI SUPRACERV ABD HYSTERECTOMY      Description: Supracervical Hysterectomy;  Proc Date: 01/01/1985;  Comments: some cervix left!; ovaries intact; done for bleeding     Social History     Socioeconomic History     Marital status:      Spouse name: Not on file     Number of children: Not on file     Years of education: Not on file     Highest education level: Not on file   Occupational History     Not on file   Social Needs     Financial resource strain: Not on file     Food insecurity     Worry: Not on file     Inability: Not on file     Transportation needs     Medical: Not on file     Non-medical: Not on file   Tobacco Use     Smoking status: Current Every Day Smoker     Packs/day: 0.50     Types: Cigarettes     Smokeless tobacco: Never Used   Substance and Sexual Activity     Alcohol use: Yes     Comment: very little     Drug use: No     Sexual activity: Not on file   Lifestyle     Physical activity     Days per week: Not on file     Minutes per session: Not on file     Stress: Not on file   Relationships     Social connections     Talks on phone: Not on file     Gets together: Not on file     Attends Restorationism service: Not on file     Active member of club or organization: Not on file     Attends meetings of clubs or organizations: Not on file     Relationship status: Not on file     Intimate partner violence     Fear of current or ex partner: Not on file     Emotionally abused: Not on file     Physically abused: Not on file     Forced sexual activity: Not on file   Other Topics Concern     Not on file   Social History Narrative      "Not on file     Family History   Problem Relation Age of Onset     Heart failure Mother      Cancer Other         paternal HX-laryngeal      Alcoholism Sister      No Medical Problems Daughter      No Medical Problems Maternal Grandmother      No Medical Problems Maternal Grandfather      No Medical Problems Paternal Grandmother      No Medical Problems Paternal Grandfather      No Medical Problems Maternal Aunt      No Medical Problems Paternal Aunt      Alcoholism Sister      Alcoholism Brother      Alcohol abuse Father      Cancer Paternal Uncle         Gastric-Alcohol     Cancer Paternal Uncle         gastric-Alcohol     BRCA 1/2 Neg Hx      Breast cancer Neg Hx      Colon cancer Neg Hx      Endometrial cancer Neg Hx      Ovarian cancer Neg Hx        Meds:    Current Outpatient Medications:      ACCU-CHEK SOFTCLIX LANCETS lancets, TEST THREE TIMES DAILY, Disp: 300 each, Rfl: 3     albuterol (PROAIR HFA;PROVENTIL HFA;VENTOLIN HFA) 90 mcg/actuation inhaler, INHALE 2 PUFFS EVERY 4 HOURS AS NEEDED WHEEZING, Disp: 90 g, Rfl: 11     albuterol (PROVENTIL) 2.5 mg /3 mL (0.083 %) nebulizer solution, Take 3 mL (2.5 mg total) by nebulization every 4 (four) hours as needed for wheezing or shortness of breath., Disp: 75 mL, Rfl: 12     atorvastatin (LIPITOR) 20 MG tablet, TAKE 1 TABLET(20 MG) BY MOUTH AT BEDTIME, Disp: 90 tablet, Rfl: 3     BD ULTRA-FINE SHORT PEN NEEDLE 31 gauge x 5/16\" Ndle, TEST FOUR TIMES DAILY WITH MEALS AND AT BEDTIME, Disp: 400 each, Rfl: 3     blood glucose test strips, Use 1 each As Directed 3 (three) times a day as needed (for blood glucose testing). Dispense strips that are for One Touch Verio IQ meter, Disp: 300 strip, Rfl: 3     blood-glucose meter (ONETOUCH VERIO IQ METER) Misc, Check blood sugar three times a day., Disp: 1 each, Rfl: 0     budesonide-formoteroL (SYMBICORT) 160-4.5 mcg/actuation inhaler, Inhale 2 puffs 2 (two) times a day. Inhale 2 puff by mouth twice daily, Disp: 1 Inhaler, " Rfl: 2     cyclobenzaprine (FLEXERIL) 10 MG tablet, Take 1 tablet (10 mg total) by mouth at bedtime as needed for muscle spasms., Disp: 30 tablet, Rfl: 0     diaper,brief,adult,disposable (ADULT BRIEFS - LARGE) Misc, Use 3-4 daily as needed for incontinence, Disp: 120 each, Rfl: 6     diltiazem (CARDIZEM CD) 120 MG 24 hr capsule, TAKE ONE CAPSULE BY MOUTH DAILY, Disp: 90 capsule, Rfl: 1     famotidine (PEPCID) 20 MG tablet, Take 1 tablet (20 mg total) by mouth 2 (two) times a day., Disp: 180 tablet, Rfl: 3     furosemide (LASIX) 20 MG tablet, Take 1 tablet (20 mg total) by mouth daily as needed., Disp: 90 tablet, Rfl: 3     gabapentin (NEURONTIN) 600 MG tablet, TAKE 1 TABLET(600 MG) BY MOUTH THREE TIMES DAILY, Disp: 270 tablet, Rfl: 3     generic lancets (FINGERSTIX LANCETS), Dispense brand per patient's insurance at pharmacy discretion., Disp: 300 each, Rfl: 0     gentamicin (GARAMYCIN) 0.1 % ointment, APPLY TOPICALLY TO WOUND BID, Disp: , Rfl: 0     insulin aspart U-100 (NOVOLOG) 100 unit/mL injection, Inject under the skin 3 (three) times a day before meals. Inject per sliding scale, Disp: , Rfl:      ipratropium-albuterol (DUO-NEB) 0.5-2.5 mg/3 mL nebulizer, INAHALE 1 VIAL IN NEBULIZER EVERY 4 HOURS AS NEEDED, Disp: 1440 mL, Rfl: 0     meclizine (ANTIVERT) 25 mg tablet, TAKE 1 TABLET(25 MG) BY MOUTH THREE TIMES DAILY AS NEEDED FOR DIZZINESS OR NAUSEA, Disp: 45 tablet, Rfl: 5     metFORMIN (GLUCOPHAGE) 500 MG tablet, Take 1 tablet (500 mg total) by mouth 2 (two) times a day with meals., Disp: 180 tablet, Rfl: 3     mometasone-formoterol (DULERA) 200-5 mcg/actuation HFAA inhaler, Inhale 2 puffs 2 (two) times a day., Disp: 1 Inhaler, Rfl: 5     nystatin (NYSTOP) powder, Apply 1 application topically 2 (two) times a day as needed. 2-3 times to affected area(s)., Disp: 60 g, Rfl: 3     oxyCODONE (ROXICODONE) 10 mg immediate release tablet, Take 1 tablet (10 mg total) by mouth every 6 (six) hours as needed., Disp: 120  "tablet, Rfl: 0     polyethylene glycol (MIRALAX) 17 gram packet, Take 1 packet (17 g total) by mouth daily. (Patient taking differently: Take 17 g by mouth daily as needed.    ), Disp: , Rfl: 0     sucralfate (CARAFATE) 1 gram tablet, TAKE 1 TABLET BY MOUTH FOUR TIMES DAILY(CRUSH TABLET AND MIX IT WITH A LITTLE WATER, THEN SWALLOW), Disp: 120 tablet, Rfl: 12     warfarin ANTICOAGULANT (COUMADIN/JANTOVEN) 5 MG tablet, Take 1/2-1 tablet (2.5-5 mg) daily as directed. Adjust dose per INR results., Disp: 90 tablet, Rfl: 1    Allergies:  Allergies   Allergen Reactions     Celebrex [Celecoxib] Rash     patient had butterfly rash - \"lupus-like\"       Latex Rash       ROS:  Pertinent positives as noted in HPI; otherwise 12 point ROS negative.      Physical Exam:  EXAM:  /59 (Patient Site: Right Arm, Patient Position: Sitting, Cuff Size: Adult Regular)   Pulse 67   Temp 98.5  F (36.9  C) (Tympanic)   Resp 18   Wt 161 lb (73 kg)   BMI 27.64 kg/m     Gen:  NAD, appears well, well-hydrated  HEENT:  TMs nl, oropharynx benign, nasal mucosa nl, conjunctiva clear  Neck:  Supple, no adenopathy, no thyromegaly, no carotid bruits, no JVD  Lungs:  Clear to auscultation bilaterally  Cor:  RRR no murmur  Abd:  Soft, nontender, BS+, no masses, no guarding or rebound, no HSM  Back:  Tender trigger points in upper thoracic  Extr:  Neg.  Neuro:  No asymmetry  Skin:  Warm/dry; red rash in skin creases        Results:  Results for orders placed or performed in visit on 07/21/20   INR   Result Value Ref Range    INR 1.60 (H) 0.90 - 1.10       Procedure Note - Trigger Point Injections    Consent obtained: verbal     Indication:  Pain, fibromyalgia/arthritis    5 trigger points identified.  Each trigger point swabbed x 3 with Betadine swab.  Each trigger point then injected with 2 ml of 1% Lidocaine (no epi).    Total volume injected:  10 ml    Complications:  None, patient tolerated procedure well.    Plan:  Discussed care with " patient.  RTC for further injections in 30 days prn.

## 2021-06-09 NOTE — TELEPHONE ENCOUNTER
RN cannot approve Refill Request    RN can NOT refill this medication med is not covered by policy/route to provider. Last office visit: 6/22/2020 Cammy Bui MD Last Physical: 6/3/2019 Last MTM visit: Visit date not found Last visit same specialty: 6/22/2020 Cammy Bui MD.  Next visit within 3 mo: Visit date not found  Next physical within 3 mo: Visit date not found      Floridalma Chinchilla, [facility] Triage/Med Refill 6/28/2020    Requested Prescriptions   Pending Prescriptions Disp Refills     warfarin ANTICOAGULANT (COUMADIN/JANTOVEN) 5 MG tablet [Pharmacy Med Name: WARFARIN SOD 5MG TABLETS (PEACH)] 90 tablet 0     Sig: TAKE 1/2 TO 1 TABLET BY MOUTH DAILY AS DIRECTED. ADJUST DOSE BASED ON INR       Warfarin Refill Protocol  Failed - 6/28/2020  9:25 AM        Failed -  Route to appropriate pool/provider     Last Anticoagulation Summary:   Anticoagulation Episode Summary     Current INR goal:   2.0-3.0   TTR:   45.8 % (12 mo)   Next INR check:   7/20/2020   INR from last check:   2.30 (6/22/2020)   Weekly max warfarin dose:      Target end date:   Indefinite   INR check location:      Preferred lab:      Send INR reminders to:   [facility]    Indications    Paroxysmal Atrial Fibrillation [I48.91]           Comments:            Anticoagulation Care Providers     Provider Role Specialty Phone number    Cammy Bui MD Referring Family Medicine [phone]                Passed - Provider visit in last year     Last office visit with prescriber/PCP: 6/22/2020 Cammy Bui MD OR same dept: 6/22/2020 Cammy Bui MD OR same specialty: 6/22/2020 Cammy Bui MD  Last physical: 6/3/2019 Last MTM visit: Visit date not found    Next appt within 3 mo: Visit date not found Next physical within 3 mo: Visit date not found  Prescriber OR PCP: Cammy Bui MD  Last diagnosis associated with med order:  1. Paroxysmal atrial fibrillation (H)  - warfarin ANTICOAGULANT (COUMADIN/JANTOVEN) 5 MG tablet [Pharmacy Med Name: WARFARIN SOD 5MG TABLETS (PEACH)]; TAKE 1/2 TO 1 TABLET BY MOUTH DAILY AS DIRECTED. ADJUST DOSE BASED ON INR  Dispense: 90 tablet; Refill: 0    If protocol passes may refill for 6 months if within 3 months of last provider visit (or a total of 9 months).

## 2021-06-09 NOTE — TELEPHONE ENCOUNTER
ANTICOAGULATION  MANAGEMENT    Assessment     Today's INR result of 1.6 is Subtherapeutic (goal INR of 2.0-3.0)        Warfarin taken as previously instructed    No new diet changes affecting INR    No new medication/supplements affecting INR    Continues to tolerate warfarin with no reported s/s of bleeding or thromboembolism     Previous INR was Therapeutic    Plan:     Spoke with Mary Kay regarding INR result and instructed:     Warfarin Dosing Instructions:  Take one time booster dose warfarin 5 mg  then change warfarin dose to 2.5 mg daily on Sundays and Tuesdays; and 5 mg daily rest of week  (9.1 % change)    Closest to 10% change     Instructed patient to follow up no later than: 1-2 weeks     Education provided: importance of therapeutic range, importance of following up for INR monitoring at instructed interval and importance of taking warfarin as instructed    Radha verbalizes understanding and agrees to warfarin dosing plan.    Instructed to call the [facility] for any changes, questions or concerns. (#[phone])   ?   Yuliana Grimes RN    Subjective/Objective:      Mary Kay Tejada, a 70 y.o. female is on warfarin.     Mary Kay reports:     Home warfarin dose: verbally confirmed home dose with Mary Kay  and updated on anticoagulation calendar - Wednesday left blank, confirmed no doses missed      Missed doses: No     Medication changes:  No     S/S of bleeding or thromboembolism:  No     New Injury or illness:  No     Changes in diet or alcohol consumption:  No     Upcoming surgery, procedure or cardioversion:  No    Anticoagulation Episode Summary     Current INR goal:   2.0-3.0   TTR:   43.7 % (1 y)   Next INR check:   8/4/2020   INR from last check:   1.60! (7/21/2020)   Weekly max warfarin dose:      Target end date:   Indefinite   INR check location:      Preferred lab:      Send INR reminders to:   [facility]    Indications    Paroxysmal Atrial Fibrillation [I48.91]            Comments:            Anticoagulation Care Providers     Provider Role Specialty Phone number    Cammy Bui MD Referring Family Medicine [phone]

## 2021-06-09 NOTE — TELEPHONE ENCOUNTER
ANTICOAGULATION  MANAGEMENT    Assessment     Today's INR result of 2.3 is Therapeutic (goal INR of 2.0-3.0)        Warfarin taken as previously instructed    No new diet changes affecting INR    No new medication/supplements affecting INR    Continues to tolerate warfarin with no reported s/s of bleeding or thromboembolism     Previous INR was Therapeutic    Plan:     Spoke with Mary Kay regarding INR result and instructed:     Warfarin Dosing Instructions:  Continue current warfarin dose 2.5 mg daily on Sundays, Tuesdays and Thursdays; and 5 mg daily rest of week  (0 % change)    Instructed patient to follow up no later than: one month.    Education provided: importance of therapeutic range, importance of following up for INR monitoring at instructed interval and importance of taking warfarin as instructed    Mary Kay verbalizes understanding and agrees to warfarin dosing plan.    Instructed to call the [facility] for any changes, questions or concerns. (#[phone])   ?   Mena Dumont RN    Subjective/Objective:      Mary Kay RALF Tejada, a 70 y.o. female is on warfarin.     Mary Kay reports:     Home warfarin dose: verbally confirmed home dose with Mary Kay and updated on anticoagulation calendar     Missed doses: No     Medication changes:  No     S/S of bleeding or thromboembolism:  No     New Injury or illness:  No     Changes in diet or alcohol consumption:  No     Upcoming surgery, procedure or cardioversion:  No    Anticoagulation Episode Summary     Current INR goal:   2.0-3.0   TTR:   45.1 % (1 y)   Next INR check:   7/20/2020   INR from last check:   2.30 (6/22/2020)   Weekly max warfarin dose:      Target end date:   Indefinite   INR check location:      Preferred lab:      Send INR reminders to:   [facility]    Indications    Paroxysmal Atrial Fibrillation [I48.91]           Comments:            Anticoagulation Care Providers     Provider Role Specialty Phone number     Cammy Bui MD [facility] Medicine [phone]

## 2021-06-09 NOTE — PROGRESS NOTES
[facility] Care Coordination Contact      [facility] Six-Month Telephone Assessment    6 month telephone assessment completed on 6/30/20.    ER visits: No  Hospitalizations: No  TCU stays: No  Significant health status changes: No  Falls/Injuries: No  ADL/IADL changes: No  Changes in services: No    Caregiver Assessment follow up:  n/a    Goals: See POC in chart for goal progress documentation.      Will see member in 6 months for an annual health risk assessment.   Encouraged member to call CC with any questions or concerns in the meantime.     Samantha Alexandra RN, PHN   [facility]  [phone]

## 2021-06-09 NOTE — PROGRESS NOTES
ASSESSMENT/PLAN:  1. Trigger point of thoracic region     2. Chronic pain syndrome  lidocaine 10 mg/mL (1 %) injection 1 mL    Oxycodone HCl 10 mg Tab   3. Paroxysmal atrial fibrillation  INR   4. Type 2 diabetes mellitus with hyperglycemia  Glycosylated Hemoglobin A1c   5. Mixed hyperlipidemia  Comprehensive Metabolic Panel    Lipid Profile   6. Hypokalemia     7. Essential hypertension with goal blood pressure less than 140/90     8. Urinary frequency  Urinalysis    Culture, Urine       This is a 67-year-old female, here today for trigger injections.  She has underlying diagnosis of fibromyalgia, with multiple trigger points in the right thoracic region.  Doing monthly trigger point injections with lidocaine seems to have decreased her need for increasing narcotic therapy.  She is unable to take NSAID therapy due to underlying GI ulcers, cardiac disease.  Tylenol has not been effective for her underlying rheumatoid arthritis.  Please further procedure note below for trigger point injections.    Patient's also concerned by her blood sugars.  She has had somewhat erratic blood pressure control with both low and high blood sugars.  She believes that recently her blood sugars at the little bit higher, but has been trying to avoid low blood sugars as well.  She also complains of some dysuria and urinary frequency.  While this may be related to some glycosuria related to her blood sugars, this also may be related to infection, and a think it is reasonable to check a urine culture before treating.    Other underlying medical concerns include that of paroxysmal atrial fibrillation, and she is due for INR testing.  This is followed by her and coagulation team.    She also has mixed hyperlipidemia, history of hypokalemia, history of hypertension.  Of note blood pressure is well-controlled today.  We have discussed this, and will refill medications as noted above.    Administrations This Visit     lidocaine 10 mg/mL (1 %)  injection 1 mL     Admin Date Action Dose Route Administered By             02/21/2017 Given 1 mL Intra-articular Emma Brown CMA                          Medications Discontinued During This Encounter   Medication Reason     Oxycodone HCl 10 mg Tab Reorder     There are no Patient Instructions on file for this visit.    Chief Complaint:  Chief Complaint   Patient presents with     Injections     trigger point injections     high blood glucose       HPI:   Mary Kay Tejada is a 67 y.o. female c/o  1. Desires trigger point injections  2.  Elevated blood sugars - sometimes high, sometimes low -   Hasn't changed anything - did move -hasn't had time to pay attention to monitoring as much  3.  Urinary frequency - not necessarily associated with polydipsia  No fever, no chills, no back pain      PMH:   Patient Active Problem List    Diagnosis Date Noted     Chronic obstructive pulmonary disease with acute lower respiratory infection 12/24/2016     Gastroenteritis 12/24/2016     Syncope 12/22/2016     Opacity of lung on imaging study 12/22/2016     Acute gastritis 12/22/2016     Osteoarthritis of right knee 08/22/2016     Primary osteoarthritis of left hip 06/20/2016     Primary osteoarthritis of left knee 06/20/2016     Candidal intertrigo 05/11/2016     Type 2 diabetes mellitus with hypoglycemia      Aspiration pneumonia 03/01/2016     Controlled substance agreement signed 11/23/2015     Cervical neck pain with evidence of disc disease 07/22/2015     Headache 07/17/2015     Hematoma of abdominal wall 07/05/2015     Skin lesion of face 05/22/2015     Tendonitis of wrist, right 04/22/2015     Menopause 04/06/2015     Flashers or floaters of right eye 02/23/2015     Tendonitis of wrist, left 01/21/2015     Trigger point of thoracic region 01/21/2015     Generalized osteoarthrosis, involving multiple sites 01/14/2015     Vertigo 12/17/2014     Rotator cuff tendonitis 12/17/2014     Aortic Stenosis      Abnormal Weight  Loss      Osteoarthritis Of The Shoulder      Chronic Constipation      Onychomycosis Of The Toenails      Diarrhea      Ganglion Of The Left Wrist      Larynx Edema      Obesity      Malignant Vulvar Neoplasm      Medial Epicondylitis      Skin Lesion      Urinary Incontinence      Chronic Major Depression      Dry Eye Syndrome      Nicotine Dependence      Hypokalemia      Essential hypertension      Muscle Cramps In The Calf      Edema      Atrial flutter      Lump In / On The Skin      Type 2 diabetes mellitus with hyperglycemia      Chronic pain syndrome      Paroxysmal Atrial Fibrillation      Fibromyalgia      Nausea      Abdominal Pain      Peptic Ulcer      COPD exacerbation      Mixed hyperlipidemia      Chronic Reflux Esophagitis      Benign Adenomatous Polyp Of The Large Intestine      Past Medical History:   Diagnosis Date     Aortic stenosis      Atrial fibrillation      Atrial flutter      Benign neoplasm of adenomatous polyp     large intestine      Chronic constipation      Chronic pain syndrome      COPD (chronic obstructive pulmonary disease)     Oxygen at night      Depression      Diabetes mellitus      Dry eye syndrome      Fibromyalgia      Ganglion     left wrist     GERD (gastroesophageal reflux disease)      Hyperlipidemia      Hypertension      Hypokalemia      Larynx edema      Lung disease      Medial epicondylitis      Onychomycosis      Osteoarthritis      Otitis Externa     Created by Conversion      Peptic ulcer      Type 2 diabetes mellitus      Vulvar malignant neoplasm      Past Surgical History:   Procedure Laterality Date     BREAST BIOPSY Right      BREAST BIOPSY Right 01/28/2015     BREAST BIOPSY Right 1/28/2015    Procedure: RIGHT BREAST BIOPSY AFTER WIRE LOCALIZATION AT 0940;  Surgeon: Renée Soriano MD;  [place] [facility] [place]  Service:      HYSTERECTOMY       IN ABLATE HEART DYSRHYTHM FOCUS      Description: Catheter Ablation Atrial Fibrillation;  Recorded:  07/31/2012;  Comments: 7/24/12 PVI with Dr. Gardiner and nilay to all 5 pulm veins and CTI fl ablation line as well.     FL BIOPSY OF BREAST, INCISIONAL      Description: Incisional Breast Biopsy;  Recorded: 11/13/2007;  Comments: benign     FL SUPRACERV ABD HYSTERECTOMY      Description: Supracervical Hysterectomy;  Proc Date: 01/01/1985;  Comments: some cervix left!; ovaries intact; done for bleeding     FL TOTAL ABDOM HYSTERECTOMY      Description: Total Abdominal Hysterectomy;  Recorded: 12/31/2013;     FL TOTAL KNEE ARTHROPLASTY      Description: Total Knee Arthroplasty;  Recorded: 11/13/2007;     Social History     Social History     Marital status:      Spouse name: N/A     Number of children: N/A     Years of education: N/A     Occupational History     Not on file.     Social History Main Topics     Smoking status: Light Tobacco Smoker     Types: Cigarettes     Last attempt to quit: 1/1/2014     Smokeless tobacco: Never Used      Comment: E-cig some day     Alcohol use Yes      Comment: very little     Drug use: No     Sexual activity: Not on file     Other Topics Concern     Not on file     Social History Narrative       Meds:    Current Outpatient Prescriptions:      albuterol (PROVENTIL) 2.5 mg /3 mL (0.083 %) nebulizer solution, Take 3 mL (2.5 mg total) by nebulization every 4 (four) hours as needed for wheezing or shortness of breath., Disp: 75 mL, Rfl: 12     aspirin 81 MG EC tablet, Take 81 mg by mouth daily., Disp: , Rfl:      atorvastatin (LIPITOR) 20 MG tablet, Take 1 tablet (20 mg total) by mouth bedtime., Disp: 90 tablet, Rfl: 3     blood glucose meter (GLUCOMETER), Use 1 each As Directed as needed. Dispense glucometer brand per patient's insurance at pharmacy discretion., Disp: 1 each, Rfl: 0     blood glucose test strips, Use 1 each As Directed as needed. Dispense brand per patient's insurance at pharmacy discretion., Disp: 100 each, Rfl: 11     blood glucose test strips, Use 1 each As  Directed as needed. Dispense brand per patient's insurance at pharmacy discretion., Disp: 100 each, Rfl: 11     DAILY-FAYE tablet, TAKE 1 TABLET BY MOUTH EVERY DAY, Disp: 90 tablet, Rfl: 3     escitalopram oxalate (LEXAPRO) 10 MG tablet, Take 10 mg by mouth daily., Disp: , Rfl:      escitalopram oxalate (LEXAPRO) 10 MG tablet, TAKE 1 TABLET BY MOUTH EVERY DAY, Disp: 30 tablet, Rfl: 0     estradiol (ESTRACE) 2 MG tablet, Take 2 mg by mouth daily., Disp: , Rfl:      furosemide (LASIX) 20 MG tablet, Take 20 mg by mouth daily as needed., Disp: , Rfl:      generic lancets, Use 1 each As Directed 3 (three) times a day. Accu-Chek Soft Touch, Disp: 100 each, Rfl: 11     insulin aspart (NOVOLOG FLEXPEN) 100 unit/mL injection pen, Sliding scale QID (meals/bedtime): -180, 2 units; -220, 4 u; -260, 6 u; -300, 8 u; -340, 10 u; BG >340, 12 u, Disp: 3 mL, Rfl: 3     insulin aspart (NOVOLOG FLEXPEN) 100 unit/mL injection pen, Sliding scale QID (meals/bedtime): -180, 2 units; -220, 4 u; -260, 6 u; -300, 8 u; -340, 10 u; BG >340, 12 u, Disp: 5 Pre-filled Pen Syringe, Rfl: 0     ipratropium-albuterol (DUO-NEB) 0.5-2.5 mg/3 mL nebulizer, Take 3 mL by nebulization every 4 (four) hours as needed. As directed., Disp: , Rfl:      leg brace (ELASTIC KNEE SUPPORT) Misc, Use 1 each As Directed daily., Disp: , Rfl: 0     loperamide (IMODIUM) 2 mg capsule, Take 2 capsules by mouth initially followed by 1 capsule after each loose stool bowel movement. Do not exceed 8 capsules per day (Patient taking differently: Take 2-4 mg by mouth see administration instructions. Take 2 capsules by mouth initially followed by 1 capsule after each loose stool bowel movement. Do not exceed 8 capsules per day), Disp: 30 capsule, Rfl: 1     meclizine (ANTIVERT) 25 mg tablet, Take 1 tablet (25 mg total) by mouth 3 (three) times a day as needed for dizziness or nausea., Disp: 30 tablet, Rfl: 3     metFORMIN  "(GLUCOPHAGE) 500 MG tablet, Take 1 tablet (500 mg total) by mouth 2 (two) times a day with meals., Disp: 90 tablet, Rfl: 2     MULTIVITAMIN (DAILY-FAYE ORAL), Take 1 tablet by mouth daily., Disp: , Rfl:      MULTIVITAMIN WITH MINERALS (HAIR,SKIN AND NAILS ORAL), Take 3 tablets by mouth daily. Chewable formulation., Disp: , Rfl:      NEBULIZER/COMPRESSOR (NEBULIZER AND COMPRESSOR MISC), Use As Directed. Use UTD, Disp: , Rfl:      nystatin (NYSTOP) powder, Apply 1 application topically 2 (two) times a day as needed. 2-3 times to affected area(s)., Disp: 15 g, Rfl: 3     olopatadine (PATANOL) 0.1 % ophthalmic solution, Administer 1 drop to both eyes 2 (two) times a day as needed for allergies. , Disp: , Rfl:      omeprazole (PRILOSEC) 20 MG capsule, Take 1 capsule (20 mg total) by mouth 2 (two) times a day., Disp: 60 capsule, Rfl: 0     Oxycodone HCl 10 mg Tab, Take 10 mg by mouth every 6 (six) hours as needed., Disp: 120 each, Rfl: 0     pen needle, diabetic 31 gauge x 5/16\" Ndle, Use 1 each As Directed 4 (four) times a day with meals and bedtime., Disp: 300 each, Rfl: 0     predniSONE (DELTASONE) 10 MG tablet, 6 tabs po daily x 2 d, 5 tabs po daily x 2 d, 4 tabs po daily x 2 d,  3 tabs po daily x 2 d,  2 tabs po daily x 2 d,  1 tab po daily x 2 d, Disp: 42 tablet, Rfl: 0     ranitidine (ZANTAC) 150 MG tablet, Take 150 mg by mouth 2 (two) times a day., Disp: , Rfl:      ranitidine (ZANTAC) 150 MG tablet, TAKE 1 TABLET BY MOUTH TWICE DAILY, Disp: 180 tablet, Rfl: 0     senna-docusate (PERICOLACE) 8.6-50 mg tablet, Take 2 tablets by mouth daily as needed for constipation., Disp: 30 tablet, Rfl: 1     UNABLE TO FIND, Take 1 capsule by mouth daily. Med Name: Probiotic with green tea., Disp: , Rfl:      VENTOLIN HFA 90 mcg/actuation inhaler, INHALE 1 TO 2 PUFFS BY MOUTH EVERY 4 TO 6 HOURS AS NEEDED, Disp: 18 g, Rfl: 0     warfarin (COUMADIN) 5 MG tablet, Take by mouth See Admin Instructions. 2.5 mg (half of a tablet) two " "days weekly (on Tuesdays and Fridays) and 5 mg (one tablet) five days weekly (on Sundays, Mondays, Wednesdays, Thursdays, and Saturdays). Adjust dose based on INR results as directed., Disp: , Rfl:      warfarin (COUMADIN) 5 MG tablet, TAKE 1 TABLET(5 MG) BY MOUTH DAILY, Disp: 90 tablet, Rfl: 0     budesonide-formoterol (SYMBICORT) 160-4.5 mcg/actuation inhaler, Inhale 2 puffs 2 (two) times a day., Disp: 1 Inhaler, Rfl: 2     diltiazem (CARTIA XT) 120 MG 24 hr capsule, Take 1 capsule (120 mg total) by mouth daily., Disp: 30 capsule, Rfl: 6    Allergies:  Allergies   Allergen Reactions     Celebrex [Celecoxib] Rash     patient had butterfly rash - \"lupus-like\"       Latex Rash       ROS:  Pertinent positives as noted in HPI; otherwise 12 point ROS negative.      Physical Exam:  EXAM:  Visit Vitals     /70     Pulse 100     Temp 98.2  F (36.8  C) (Oral)     Resp 14     Wt 220 lb (99.8 kg)     BMI 36.05 kg/m2      Gen:  NAD, appears well, well-hydrated  HEENT:  TMs nl, oropharynx benign, nasal mucosa nl, conjunctiva clear  Neck:  Supple, no adenopathy, no thyromegaly, no carotid bruits, no JVD  Lungs:  Clear to auscultation bilaterally  Cor:  RRR no murmur  Abd:  Soft, nontender, BS+, no masses, no guarding or rebound, no HSM  Extr:  Neg.  Neuro:  No asymmetry  Skin:  Warm/dry  Trigger point tenderness in right upper parathoracic/paracervical/suprascapular regions      Procedure Note - Trigger Point Injections    Consent obtained: verbal    Indication:  Fibromyalgia trigger points    5 trigger points identified.  Each trigger point swabbed x 3 with Betadine swab.  Each trigger point then injected with 2 ml of 1% Lidocaine (no epi).    Total volume injected:  10 ml    Complications:  None, patient tolerated procedure well.    Plan:  Discussed care with patient.  RTC for further injections in 30 days prn.      "

## 2021-06-09 NOTE — PROGRESS NOTES
ASSESSMENT/PLAN:  1. Shoulder pain  lidocaine 10 mg/mL (1 %) injection 10 mL   2. Chronic pain syndrome  Oxycodone HCl 10 mg Tab   3. Trigger point of thoracic region     4. Fibromyalgia         This is a 67-year-old female, seen today for evaluation of trigger point pain associated with fibromyalgia.  We have been using trigger point injections of lidocaine 1% to decrease the amount of opioids the patient is using.  She has had a history of peptic ulcer disease, has underlying coronary artery disease, and is not a candidate for NSAID therapy.  She has taken a stable dose of opiates for many years, and does have a controlled substance agreement on file.  These were refilled today.  In addition, the trigger point injections were completed today, please see procedure note below.        Medications Discontinued During This Encounter   Medication Reason     ranitidine (ZANTAC) 150 MG tablet Duplicate order     Oxycodone HCl 10 mg Tab Reorder     There are no Patient Instructions on file for this visit.    Chief Complaint:  Chief Complaint   Patient presents with     Injections     trigger point injections       HPI:   Mary Kay Tejada is a 67 y.o. female c/o  1.  Trigger point pain - here for injections (done q monthly)  2.  Renewal of pain medications - reviewed use - no signs of overuse or concerning patterns of behavior.  Has controlled substance agreement.  3.  Feeling better - finished prednisone - doing well, less coughing, no fever.    PMH:   Patient Active Problem List    Diagnosis Date Noted     Chronic obstructive pulmonary disease with acute lower respiratory infection 12/24/2016     Gastroenteritis 12/24/2016     Syncope 12/22/2016     Opacity of lung on imaging study 12/22/2016     Acute gastritis 12/22/2016     Osteoarthritis of right knee 08/22/2016     Primary osteoarthritis of left hip 06/20/2016     Primary osteoarthritis of left knee 06/20/2016     Candidal intertrigo 05/11/2016     Type 2 diabetes  mellitus with hypoglycemia      Aspiration pneumonia 03/01/2016     Controlled substance agreement signed 11/23/2015     Cervical neck pain with evidence of disc disease 07/22/2015     Headache 07/17/2015     Hematoma of abdominal wall 07/05/2015     Skin lesion of face 05/22/2015     Tendonitis of wrist, right 04/22/2015     Menopause 04/06/2015     Flashers or floaters of right eye 02/23/2015     Tendonitis of wrist, left 01/21/2015     Trigger point of thoracic region 01/21/2015     Generalized osteoarthrosis, involving multiple sites 01/14/2015     Vertigo 12/17/2014     Rotator cuff tendonitis 12/17/2014     Aortic Stenosis      Abnormal Weight Loss      Osteoarthritis Of The Shoulder      Chronic Constipation      Onychomycosis Of The Toenails      Diarrhea      Ganglion Of The Left Wrist      Larynx Edema      Obesity      Malignant Vulvar Neoplasm      Medial Epicondylitis      Skin Lesion      Urinary Incontinence      Chronic Major Depression      Dry Eye Syndrome      Nicotine Dependence      Hypokalemia      Essential hypertension      Muscle Cramps In The Calf      Edema      Atrial flutter      Lump In / On The Skin      Type 2 diabetes mellitus with hyperglycemia      Chronic pain syndrome      Paroxysmal Atrial Fibrillation      Fibromyalgia      Nausea      Abdominal Pain      Peptic Ulcer      COPD exacerbation      Mixed hyperlipidemia      Chronic Reflux Esophagitis      Benign Adenomatous Polyp Of The Large Intestine      Past Medical History:   Diagnosis Date     Aortic stenosis      Atrial fibrillation      Atrial flutter      Benign neoplasm of adenomatous polyp     large intestine      Chronic constipation      Chronic pain syndrome      COPD (chronic obstructive pulmonary disease)     Oxygen at night      Depression      Diabetes mellitus      Dry eye syndrome      Fibromyalgia      Ganglion     left wrist     GERD (gastroesophageal reflux disease)      Hyperlipidemia      Hypertension       Hypokalemia      Larynx edema      Lung disease      Medial epicondylitis      Onychomycosis      Osteoarthritis      Otitis Externa     Created by Conversion      Peptic ulcer      Type 2 diabetes mellitus      Vulvar malignant neoplasm      Past Surgical History:   Procedure Laterality Date     BREAST BIOPSY Right      BREAST BIOPSY Right 01/28/2015     BREAST BIOPSY Right 1/28/2015    Procedure: RIGHT BREAST BIOPSY AFTER WIRE LOCALIZATION AT 0940;  Surgeon: Renée Soriano MD;  [facility];  Service:      HYSTERECTOMY       MD ABLATE HEART DYSRHYTHM FOCUS      Description: Catheter Ablation Atrial Fibrillation;  Recorded: 07/31/2012;  Comments: 7/24/12 PVI with Dr. Gardiner and nilay to all 5 pulm veins and CTI fl ablation line as well.     MD BIOPSY OF BREAST, INCISIONAL      Description: Incisional Breast Biopsy;  Recorded: 11/13/2007;  Comments: benign     MD SUPRACERV ABD HYSTERECTOMY      Description: Supracervical Hysterectomy;  Proc Date: 01/01/1985;  Comments: some cervix left!; ovaries intact; done for bleeding     MD TOTAL ABDOM HYSTERECTOMY      Description: Total Abdominal Hysterectomy;  Recorded: 12/31/2013;     MD TOTAL KNEE ARTHROPLASTY      Description: Total Knee Arthroplasty;  Recorded: 11/13/2007;     Social History     Social History     Marital status:      Spouse name: N/A     Number of children: N/A     Years of education: N/A     Occupational History     Not on file.     Social History Main Topics     Smoking status: Light Tobacco Smoker     Types: Cigarettes     Last attempt to quit: 1/1/2014     Smokeless tobacco: Never Used      Comment: E-cig some day     Alcohol use Yes      Comment: very little     Drug use: No     Sexual activity: Not on file     Other Topics Concern     Not on file     Social History Narrative       Meds:    Current Outpatient Prescriptions:      albuterol (PROVENTIL) 2.5 mg /3 mL (0.083 %) nebulizer solution, Take 3 mL (2.5 mg total) by  nebulization every 4 (four) hours as needed for wheezing or shortness of breath., Disp: 75 mL, Rfl: 12     aspirin 81 MG EC tablet, Take 81 mg by mouth daily., Disp: , Rfl:      atorvastatin (LIPITOR) 20 MG tablet, Take 1 tablet (20 mg total) by mouth bedtime., Disp: 90 tablet, Rfl: 3     blood glucose meter (GLUCOMETER), Use 1 each As Directed as needed. Dispense glucometer brand per patient's insurance at pharmacy discretion., Disp: 1 each, Rfl: 0     blood glucose test strips, Use 1 each As Directed as needed. Dispense brand per patient's insurance at pharmacy discretion., Disp: 100 each, Rfl: 11     blood glucose test strips, Use 1 each As Directed as needed. Dispense brand per patient's insurance at pharmacy discretion., Disp: 100 each, Rfl: 11     budesonide-formoterol (SYMBICORT) 160-4.5 mcg/actuation inhaler, Inhale 2 puffs 2 (two) times a day., Disp: 1 Inhaler, Rfl: 2     DAILY-FAYE tablet, TAKE 1 TABLET BY MOUTH EVERY DAY, Disp: 90 tablet, Rfl: 3     diltiazem (CARTIA XT) 120 MG 24 hr capsule, Take 1 capsule (120 mg total) by mouth daily., Disp: 30 capsule, Rfl: 6     escitalopram oxalate (LEXAPRO) 10 MG tablet, Take 10 mg by mouth daily., Disp: , Rfl:      escitalopram oxalate (LEXAPRO) 10 MG tablet, TAKE 1 TABLET BY MOUTH EVERY DAY, Disp: 30 tablet, Rfl: 0     estradiol (ESTRACE) 2 MG tablet, Take 2 mg by mouth daily., Disp: , Rfl:      furosemide (LASIX) 20 MG tablet, Take 20 mg by mouth daily as needed., Disp: , Rfl:      generic lancets, Use 1 each As Directed 3 (three) times a day. Accu-Chek Soft Touch, Disp: 100 each, Rfl: 11     insulin aspart (NOVOLOG FLEXPEN) 100 unit/mL injection pen, Sliding scale QID (meals/bedtime): -180, 2 units; -220, 4 u; -260, 6 u; -300, 8 u; -340, 10 u; BG >340, 12 u, Disp: 3 mL, Rfl: 3     insulin aspart (NOVOLOG FLEXPEN) 100 unit/mL injection pen, Sliding scale QID (meals/bedtime): -180, 2 units; -220, 4 u; -260, 6 u; BG  "261-300, 8 u; -340, 10 u; BG >340, 12 u, Disp: 5 Pre-filled Pen Syringe, Rfl: 0     ipratropium-albuterol (DUO-NEB) 0.5-2.5 mg/3 mL nebulizer, Take 3 mL by nebulization every 4 (four) hours as needed. As directed., Disp: , Rfl:      leg brace (ELASTIC KNEE SUPPORT) Misc, Use 1 each As Directed daily., Disp: , Rfl: 0     loperamide (IMODIUM) 2 mg capsule, Take 2 capsules by mouth initially followed by 1 capsule after each loose stool bowel movement. Do not exceed 8 capsules per day (Patient taking differently: Take 2-4 mg by mouth see administration instructions. Take 2 capsules by mouth initially followed by 1 capsule after each loose stool bowel movement. Do not exceed 8 capsules per day), Disp: 30 capsule, Rfl: 1     meclizine (ANTIVERT) 25 mg tablet, Take 1 tablet (25 mg total) by mouth 3 (three) times a day as needed for dizziness or nausea., Disp: 30 tablet, Rfl: 3     metFORMIN (GLUCOPHAGE) 500 MG tablet, Take 1 tablet (500 mg total) by mouth 2 (two) times a day with meals., Disp: 90 tablet, Rfl: 2     MULTIVITAMIN (DAILY-FAYE ORAL), Take 1 tablet by mouth daily., Disp: , Rfl:      MULTIVITAMIN WITH MINERALS (HAIR,SKIN AND NAILS ORAL), Take 3 tablets by mouth daily. Chewable formulation., Disp: , Rfl:      NEBULIZER/COMPRESSOR (NEBULIZER AND COMPRESSOR MISC), Use As Directed. Use UTD, Disp: , Rfl:      nystatin (NYSTOP) powder, Apply 1 application topically 2 (two) times a day as needed. 2-3 times to affected area(s)., Disp: 15 g, Rfl: 3     olopatadine (PATANOL) 0.1 % ophthalmic solution, Administer 1 drop to both eyes 2 (two) times a day as needed for allergies. , Disp: , Rfl:      omeprazole (PRILOSEC) 20 MG capsule, Take 1 capsule (20 mg total) by mouth 2 (two) times a day., Disp: 60 capsule, Rfl: 0     Oxycodone HCl 10 mg Tab, Take 10 mg by mouth every 6 (six) hours as needed., Disp: 120 each, Rfl: 0     pen needle, diabetic 31 gauge x 5/16\" Ndle, Use 1 each As Directed 4 (four) times a day with " "meals and bedtime., Disp: 300 each, Rfl: 0     ranitidine (ZANTAC) 150 MG tablet, Take 150 mg by mouth 2 (two) times a day., Disp: , Rfl:      senna-docusate (PERICOLACE) 8.6-50 mg tablet, Take 2 tablets by mouth daily as needed for constipation., Disp: 30 tablet, Rfl: 1     UNABLE TO FIND, Take 1 capsule by mouth daily. Med Name: Probiotic with green tea., Disp: , Rfl:      VENTOLIN HFA 90 mcg/actuation inhaler, INHALE 1 TO 2 PUFFS BY MOUTH EVERY 4 TO 6 HOURS AS NEEDED, Disp: 18 g, Rfl: 0     warfarin (COUMADIN) 5 MG tablet, Take by mouth See Admin Instructions. 2.5 mg (half of a tablet) two days weekly (on Tuesdays and Fridays) and 5 mg (one tablet) five days weekly (on Sundays, Mondays, Wednesdays, Thursdays, and Saturdays). Adjust dose based on INR results as directed., Disp: , Rfl:      warfarin (COUMADIN) 5 MG tablet, TAKE 1 TABLET(5 MG) BY MOUTH DAILY, Disp: 90 tablet, Rfl: 0     predniSONE (DELTASONE) 10 MG tablet, 6 tabs po daily x 2 d, 5 tabs po daily x 2 d, 4 tabs po daily x 2 d,  3 tabs po daily x 2 d,  2 tabs po daily x 2 d,  1 tab po daily x 2 d, Disp: 42 tablet, Rfl: 0    Current Facility-Administered Medications:      lidocaine 10 mg/mL (1 %) injection 10 mL, 10 mL, Intra-articular, Once, Cammy Bui MD    Allergies:  Allergies   Allergen Reactions     Celebrex [Celecoxib] Rash     patient had butterfly rash - \"lupus-like\"       Latex Rash       ROS:  Pertinent positives as noted in HPI; otherwise 12 point ROS negative.      Physical Exam:  EXAM:  Visit Vitals     /72     Pulse 84     Temp 97.9  F (36.6  C) (Oral)     Resp 14     Wt 208 lb 8 oz (94.6 kg)     BMI 34.17 kg/m2      Gen:  NAD, appears well, well-hydrated  HEENT:  TMs nl, oropharynx benign, nasal mucosa nl, conjunctiva clear  Neck:  Supple, no adenopathy, no thyromegaly, no carotid bruits, no JVD  Lungs:  Clear to auscultation bilaterally  Cor:  RRR no murmur  Abd:  Soft, nontender, BS+, no masses, no guarding or " rebound, no HSM  Extr:  Neg.; trigger point pain in right suprascapular, right paracervical  Neuro:  No asymmetry  Skin:  Warm/dry        Results:  Results for orders placed or performed in visit on 03/16/17   INR   Result Value Ref Range    INR 3.30 (H) 0.90 - 1.10       Procedure Note - Trigger Point Injections    Consent obtained: verbal    Indication:  Pain, fibromyalgia    5 trigger points identified.  Each trigger point swabbed x 3 with Betadine swab.  Each trigger point then injected with 2 ml of 1% Lidocaine (no epi).    Total volume injected:  10 ml    Complications:  None, patient tolerated procedure well.    Plan:  Discussed care with patient.  RTC for further injections in 30 days prn.

## 2021-06-10 NOTE — PROGRESS NOTES
Assessment and Plan:     1. Encounter for Medicare annual wellness exam     2. Chronic a-fib (H)  INR   3. Trigger point of thoracic region  lidocaine 10 mg/mL (1 %) injection 10 mL   4. Fibromyalgia     5. Essential hypertension     6. Advanced directives, counseling/discussion     7. Visit for screening mammogram  Mammo Screening Bilateral   8. Asymptomatic menopause  DXA Bone Density Scan   9. Chronic pain syndrome  oxyCODONE (ROXICODONE) 10 mg immediate release tablet   10. Pulmonary emphysema, unspecified emphysema type (H)     11. Diabetic polyneuropathy associated with type 2 diabetes mellitus (H)       This is a 71 yo female here for annual wellness exam - and trigger point injections  1.  Chronic Atrial Fibrillation - due for INR - followed by anticoagulation team  2.  Fibromyalgia/Chronic Pain/Trigger Point - patient benefits from trigger point injections for chronic pain - see procedure note; continue Oxycodone - no change in dosing/quantities  3.  Hypertension - blood pressure is well controlled - continue to monitor for signs of hypotension  4.  Pulmonary emphysema/COPD - uses oxygen as needed - generally stable  5.  Type II DM with neuropathy - A1c has been stable - generally controlled  6.  Health Maintenance - due for breast cancer screening - mammogram ordered; due for DXA scan - ordered;   7.  Advanced Care Directive discussion - has been provided with paperwork - has identified son, Mukul Tejada , as her decision maker         The patient's current medical problems were reviewed.    I have had an Advance Directives discussion with the patient.  The following health maintenance schedule was reviewed with the patient and provided in printed form in the after visit summary:   Health Maintenance   Topic Date Due     SPIROMETRY  1950     HEPATITIS B VACCINES (1 of 3 - Risk 3-dose series) 01/13/1969     DXA SCAN  01/13/2015     MAMMOGRAM  01/06/2017     DIABETIC EYE EXAM  01/04/2019      INFLUENZA VACCINE RULE BASED (1) 08/01/2020     A1C  12/22/2020     BMP  06/22/2021     LIPID  06/22/2021     MICROALBUMIN  06/22/2021     MEDICARE ANNUAL WELLNESS VISIT  08/17/2021     DIABETIC FOOT EXAM  08/17/2021     FALL RISK ASSESSMENT  08/17/2021     ADVANCE CARE PLANNING  08/17/2025     COLORECTAL CANCER SCREENING  06/14/2029     TD 18+ HE  08/16/2029     DEPRESSION ACTION PLAN  Completed     HEPATITIS C SCREENING  Completed     COPD ACTION PLAN  Completed     PNEUMOCOCCAL IMMUNIZATION 65+ LOW/MEDIUM RISK  Completed     ZOSTER VACCINES  Completed        Subjective:   Chief Complaint: Mary Kay Tejada is an 70 y.o. female here for an Annual Wellness visit.   HPI:      No new concerns  Wanted to do some traveling - worried about portable oxygen - decided against the travel    joint pains - always -         Review of Systems:  Please see above.  The rest of the review of systems are negative for all systems.    Patient Care Team:  Cammy Bui MD as PCP - Samantha Stewart RN as Lead Care Coordinator (Primary Care - CC)  Cammy Bui MD as Assigned PCP     Patient Active Problem List   Diagnosis     Abnormal Weight Loss     Osteoarthritis Of The Shoulder     Chronic Constipation     Onychomycosis Of The Toenails     Diarrhea     Ganglion Of The Left Wrist     Larynx Edema     Obesity     Medial Epicondylitis     Skin Lesion     Urinary Incontinence     Chronic Major Depression     Dry Eye Syndrome     Nicotine Dependence     Hypokalemia     Essential hypertension     Muscle Cramps In The Calf     Edema     Atrial flutter (H)     Lump In / On The Skin     Chronic pain syndrome     Paroxysmal Atrial Fibrillation     Fibromyalgia     Nausea     Abdominal Pain     Peptic Ulcer     COPD exacerbation (H)     Mixed hyperlipidemia     Chronic Reflux Esophagitis     Benign Adenomatous Polyp Of The Large Intestine     Vertigo     Rotator cuff tendonitis     Generalized  osteoarthrosis, involving multiple sites     Tendonitis of wrist, left     Trigger point of thoracic region     Flashers or floaters of right eye     Menopause     Tendonitis of wrist, right     Skin lesion of face     Hematoma of abdominal wall     Headache     Cervical neck pain with evidence of disc disease     Controlled substance agreement signed     Aspiration pneumonia (H)     Type 2 diabetes mellitus with hypoglycemia (H)     Candidal intertrigo     Osteoarthritis, hip, bilateral     Primary osteoarthritis of left knee     Osteoarthritis of both knees     Syncope     Opacity of lung on imaging study     Acute gastritis     Chronic obstructive pulmonary disease with acute lower respiratory infection (H)     Gastroenteritis     Nonrheumatic aortic valve stenosis     COPD (chronic obstructive pulmonary disease) (H)     Bunion, left     Callus of foot     Cataract     Chronic anemia     Anemia due to blood loss, acute     Diabetic polyneuropathy associated with type 2 diabetes mellitus (H)     Upper GI bleed     Melena     Chronic a-fib (H)     Dyslipidemia     Skin lesion     Mixed stress and urge urinary incontinence     Primary osteoarthritis of both knees     Advanced directives, counseling/discussion     Past Medical History:   Diagnosis Date     Anemia      Aortic stenosis      Atrial fibrillation (H)      Atrial flutter (H)      Benign neoplasm of adenomatous polyp     large intestine      Chronic constipation      Chronic pain syndrome      COPD (chronic obstructive pulmonary disease) (H)     Oxygen at night      Dependence on supplemental oxygen     Oxygen at noc, during the day as needed     Depression      Diabetes mellitus (H)      Dry eye syndrome      Fibromyalgia      Ganglion     left wrist     GERD (gastroesophageal reflux disease)      Hyperlipidemia      Hypertension      Hypokalemia      Larynx edema      Lung disease      Malignant Vulvar Neoplasm     Created by [employer]  Annotation: Apr 17 2007  8:24AM  Cammy Bui:  resection per Dr. Alfonso Mane 9/06;  Replacement Utility updated for latest IMO load     Medial epicondylitis      Onychomycosis      Osteoarthritis      Otitis Externa     Created by Conversion      Peptic ulcer      Polyneuropathy      Vulvar malignant neoplasm (H)       Past Surgical History:   Procedure Laterality Date     BREAST BIOPSY Right      BREAST BIOPSY Right 01/28/2015     BREAST BIOPSY Right 1/28/2015    Procedure: RIGHT BREAST BIOPSY AFTER WIRE LOCALIZATION AT 0940;  Surgeon: Renée Soriano MD;  Location: [facility] [place];  Service:      COLONOSCOPY N/A 6/14/2019    Procedure: COLONOSCOPY;  Surgeon: Eduardo Mora MD;  Location: [facility] [place];  Service: Gastroenterology     ESOPHAGOGASTRODUODENOSCOPY N/A 11/6/2018    Procedure: ESOPHAGOGASTRODUODENOSCOPY;  Surgeon: Lit Fernando MD;  Location: [facility] [place];  Service:      HYSTERECTOMY       JOINT REPLACEMENT Left     TKA     WV ABLATE HEART DYSRHYTHM FOCUS      Description: Catheter Ablation Atrial Fibrillation;  Recorded: 07/31/2012;  Comments: 7/24/12 PVI with Dr. Gardiner and nilay to all 5 pulm veins and CTI fl ablation line as well.     WV BIOPSY OF BREAST, INCISIONAL      Description: Incisional Breast Biopsy;  Recorded: 11/13/2007;  Comments: benign     WV SUPRACERV ABD HYSTERECTOMY      Description: Supracervical Hysterectomy;  Proc Date: 01/01/1985;  Comments: some cervix left!; ovaries intact; done for bleeding      Family History   Problem Relation Age of Onset     Heart failure Mother      Cancer Other         paternal HX-laryngeal      Alcoholism Sister      No Medical Problems Daughter      No Medical Problems Maternal Grandmother      No Medical Problems Maternal Grandfather      No Medical Problems Paternal Grandmother      No Medical Problems Paternal Grandfather      No Medical Problems Maternal Aunt      No Medical Problems Paternal Aunt       Alcoholism Sister      Alcoholism Brother      Alcohol abuse Father      Cancer Paternal Uncle         Gastric-Alcohol     Cancer Paternal Uncle         gastric-Alcohol     BRCA 1/2 Neg Hx      Breast cancer Neg Hx      Colon cancer Neg Hx      Endometrial cancer Neg Hx      Ovarian cancer Neg Hx       Social History     Socioeconomic History     Marital status:      Spouse name: Not on file     Number of children: Not on file     Years of education: Not on file     Highest education level: Not on file   Occupational History     Not on file   Social Needs     Financial resource strain: Not on file     Food insecurity     Worry: Not on file     Inability: Not on file     Transportation needs     Medical: Not on file     Non-medical: Not on file   Tobacco Use     Smoking status: Current Every Day Smoker     Packs/day: 0.50     Types: Cigarettes     Smokeless tobacco: Never Used   Substance and Sexual Activity     Alcohol use: Yes     Comment: very little     Drug use: No     Sexual activity: Not on file   Lifestyle     Physical activity     Days per week: Not on file     Minutes per session: Not on file     Stress: Not on file   Relationships     Social connections     Talks on phone: Not on file     Gets together: Not on file     Attends Worship service: Not on file     Active member of club or organization: Not on file     Attends meetings of clubs or organizations: Not on file     Relationship status: Not on file     Intimate partner violence     Fear of current or ex partner: Not on file     Emotionally abused: Not on file     Physically abused: Not on file     Forced sexual activity: Not on file   Other Topics Concern     Not on file   Social History Narrative     Not on file      Current Outpatient Medications   Medication Sig Dispense Refill     ACCU-CHEK SOFTCLIX LANCETS lancets TEST THREE TIMES DAILY 300 each 3     albuterol (PROAIR HFA;PROVENTIL HFA;VENTOLIN HFA) 90 mcg/actuation inhaler INHALE 2  "PUFFS EVERY 4 HOURS AS NEEDED WHEEZING 90 g 11     albuterol (PROVENTIL) 2.5 mg /3 mL (0.083 %) nebulizer solution Take 3 mL (2.5 mg total) by nebulization every 4 (four) hours as needed for wheezing or shortness of breath. 75 mL 12     atorvastatin (LIPITOR) 20 MG tablet TAKE 1 TABLET(20 MG) BY MOUTH AT BEDTIME 90 tablet 3     BD ULTRA-FINE SHORT PEN NEEDLE 31 gauge x 5/16\" Ndle TEST FOUR TIMES DAILY WITH MEALS AND AT BEDTIME 400 each 3     blood glucose test strips Use 1 each As Directed 3 (three) times a day as needed (for blood glucose testing). Dispense strips that are for One Touch Verio IQ meter 300 strip 3     blood-glucose meter (ONETOUCH VERIO IQ METER) Misc Check blood sugar three times a day. 1 each 0     budesonide-formoteroL (SYMBICORT) 160-4.5 mcg/actuation inhaler Inhale 2 puffs 2 (two) times a day. Inhale 2 puff by mouth twice daily 1 Inhaler 2     cyclobenzaprine (FLEXERIL) 10 MG tablet Take 1 tablet (10 mg total) by mouth at bedtime as needed for muscle spasms. 30 tablet 0     diaper,brief,adult,disposable (ADULT BRIEFS - LARGE) Misc Use 3-4 daily as needed for incontinence 120 each 6     diltiazem (CARDIZEM CD) 120 MG 24 hr capsule TAKE ONE CAPSULE BY MOUTH DAILY 90 capsule 1     famotidine (PEPCID) 20 MG tablet Take 1 tablet (20 mg total) by mouth 2 (two) times a day. 180 tablet 3     furosemide (LASIX) 20 MG tablet TAKE 1 TABLET(20 MG) BY MOUTH DAILY AS NEEDED 90 tablet 3     gabapentin (NEURONTIN) 600 MG tablet TAKE 1 TABLET(600 MG) BY MOUTH THREE TIMES DAILY 270 tablet 3     generic lancets (FINGERSTIX LANCETS) Dispense brand per patient's insurance at pharmacy discretion. 300 each 0     gentamicin (GARAMYCIN) 0.1 % ointment APPLY TOPICALLY TO WOUND BID  0     insulin aspart U-100 (NOVOLOG) 100 unit/mL injection Inject under the skin 3 (three) times a day before meals. Inject per sliding scale       ipratropium-albuterol (DUO-NEB) 0.5-2.5 mg/3 mL nebulizer INAHALE 1 VIAL IN NEBULIZER EVERY 4 " "HOURS AS NEEDED 1440 mL 0     meclizine (ANTIVERT) 25 mg tablet TAKE 1 TABLET(25 MG) BY MOUTH THREE TIMES DAILY AS NEEDED FOR DIZZINESS OR NAUSEA 45 tablet 5     metFORMIN (GLUCOPHAGE) 500 MG tablet Take 1 tablet (500 mg total) by mouth 2 (two) times a day with meals. 180 tablet 3     mometasone-formoterol (DULERA) 200-5 mcg/actuation HFAA inhaler Inhale 2 puffs 2 (two) times a day. 1 Inhaler 5     nystatin (NYSTOP) powder Apply 1 application topically 2 (two) times a day as needed. 2-3 times to affected area(s). 60 g 3     oxyCODONE (ROXICODONE) 10 mg immediate release tablet Take 1 tablet (10 mg total) by mouth every 6 (six) hours as needed. 120 tablet 0     polyethylene glycol (MIRALAX) 17 gram packet Take 1 packet (17 g total) by mouth daily. (Patient taking differently: Take 17 g by mouth daily as needed.       )  0     sucralfate (CARAFATE) 1 gram tablet TAKE 1 TABLET BY MOUTH FOUR TIMES DAILY(CRUSH TABLET AND MIX IT WITH A LITTLE WATER, THEN SWALLOW) 120 tablet 12     warfarin ANTICOAGULANT (COUMADIN/JANTOVEN) 5 MG tablet Take 1/2-1 tablet (2.5-5 mg) daily as directed. Adjust dose per INR results. 90 tablet 1     No current facility-administered medications for this visit.       Objective:   Vital Signs:   Visit Vitals  /70 (Patient Site: Right Arm, Patient Position: Sitting, Cuff Size: Adult Regular)   Pulse 76   Temp 99.2  F (37.3  C) (Tympanic)   Resp 18   Ht 5' 3.5\" (1.613 m)   Wt 153 lb (69.4 kg)   BMI 26.68 kg/m           VisionScreening:   Hearing Screening    125Hz 250Hz 500Hz 1000Hz 2000Hz 3000Hz 4000Hz 6000Hz 8000Hz   Right ear:            Left ear:               Visual Acuity Screening    Right eye Left eye Both eyes   Without correction: 10/16 10/16 10/16   With correction:           PHYSICAL EXAM    EXAM:  /70 (Patient Site: Right Arm, Patient Position: Sitting, Cuff Size: Adult Regular)   Pulse 76   Temp 99.2  F (37.3  C) (Tympanic)   Resp 18   Ht 5' 3.5\" (1.613 m)   Wt 153 lb " (69.4 kg)   BMI 26.68 kg/m     Gen:  NAD, appears well, well-hydrated  HEENT:  TMs nl, oropharynx benign, nasal mucosa nl, conjunctiva clear  Neck:  Supple, no adenopathy, no thyromegaly, no carotid bruits, no JVD  Lungs:  Clear to auscultation bilaterally  Cor:  RRR no murmur  Abd:  Soft, nontender, BS+, no masses, no guarding or rebound, no HSM  Extr:  Neg., trigger point tenderness in upper thoracic and paraspinal muscles  Diabetic foot exam: normal DP and PT pulses, no trophic changes or ulcerative lesions and reduced sensation at distal plantar foot  Neuro:  No asymmetry  Skin:  Warm/dry        Assessment Results 8/17/2020   Activities of Daily Living 1 - Full function   Instrumental Activities of Daily Living 2-4 - Moderate impairment   Mini Cog Total Score 2   Some recent data might be hidden     A Mini-Cog score of 0-2 suggests the possibility of dementia, score of 3-5 suggests no dementia  Fall risk increased due to joint pain, general debility  Cognitive Screen - marginal     Identified Health Risks:         Procedure Note - Trigger Point Injections    Consent obtained: verbal    Indication:  Fibromyalgia/trigger point pain/chronic pain    6 trigger points identified.  Each trigger point swabbed x 3 with Betadine swab.  Each trigger point then injected with 1.5 ml of 1% Lidocaine (no epi).    Total volume injected:  10 ml    Complications:  None, patient tolerated procedure well.    Plan:  Discussed care with patient.  RTC for further injections in 30 days prn.

## 2021-06-10 NOTE — TELEPHONE ENCOUNTER
ANTICOAGULATION  MANAGEMENT PROGRAM    Mary Kay Tejada is overdue for INR check.     Spoke with Mary Kay and patient will have INR checked at next office visit on 8/17.      Shalini Hebert, RN

## 2021-06-10 NOTE — PROGRESS NOTES
[facility]   Arrhythmia Clinic    Assessment/Plan:  Diagnoses and all orders for this visit:    Paroxysmal atrial fibrillation and atrial flutter, unspecified type with no symptoms or EKG evidence of recurrence since PVI in July 2012.  [id number] score of 4 and on chronic warfarin due to moderate risk of stroke.  I would not advise bridging with surgery.  To stop aspirin as no other indication I know of.  Spontaneous bruising and at this point perhaps stopping aspirin will help some with bruising.  Tells me right total knee replacement is higher priority than watchman.  Heart rate is faster today on physical exam but just used albuterol inhaler prior to appointment.  -     PFT Complete; Future; Expected date: 6/23/17  -     NM Pharmacologic Stress Test; Future; Expected date: 6/23/17    With no symptoms or EKG evidence of recurrence since PVI in 2012.    Essential hypertension with goal blood pressure less than 140/90    Aortic Stenosis moderate at last echo in December 2016.  Since considering  surgery within the year would recheck echo.  Systolic murmur mild so likely unchanged.  -     Echo Complete; Future; Expected date: 6/23/17      COPD (chronic obstructive pulmonary disease) and would recommend complete pulmonary function tests before she consider surgery.  Long-term smoking history and still smoking about 4-5 cigarettes a day.  Discussed importance of smoking cessation.  She tells me that she has been gradually decreasing.  She denies need for smoking cessation aid.  She currently has bronchitis or exacerbation of COPD so would need to have symptoms of cough and shortness of breath cleared before doing complete pulmonary function or cardiac tests.  To call in when cleared.  Will likely be at least a month.  Wants to do tests at [facility].    ______________________________________________________________________    Subjective:    I had the opportunity to see Mary Kay Tejada at the [facility]. Mary Kay Tejada is a 67 y.o. female and here for EP follow-up regarding paroxysmal atrial fib and has moderate aortic stenosis.  She has previously seen Dr. Pizano back in 2012.  She states it would be easier for her to follow-up at the [facility].    Mary Kay Tejada has a known history of pulmonary vein isolation ablation in July 2012 with no symptoms or EKG evidence of recurrence of A. fib since.  Mary Kay tells me that she has a cough but mostly nonproductive.  She has some shortness of breath and wheezing heard on physical exam.  She is not sure if she has had bronchitis or COPD exacerbation.  She still using oxygen all the time at night and occasionally during the daytime at home and this has been the case for several years.  Her set up for oxygen is long enough that she could be on oxygen in the house.  She came to clinic without any oxygen.  She has occasional nonproductive but congested cough today in clinic.  She denies any symptoms of recurrence of atrial fib.  Her major issue is right knee pain and tells me that she needs a right knee replacement and also is having some hip pain and may need these replaced at some point.  She has some spontaneous bruising on both forearms from warfarin.  She is requesting cardiac clearance for surgery if she decides to proceed with total knee replacement in the future.  ______________________________________________________________________    Problem List:  Patient Active Problem List   Diagnosis     Abnormal Weight Loss     Osteoarthritis Of The Shoulder     Chronic Constipation     Onychomycosis Of The Toenails     Diarrhea     Ganglion Of The Left Wrist     Larynx Edema     Obesity     Malignant Vulvar Neoplasm     Medial Epicondylitis     Skin Lesion     Urinary Incontinence     Chronic Major Depression     Dry Eye Syndrome     Nicotine Dependence     Hypokalemia     Essential hypertension     Muscle Cramps In The Calf     Edema      Atrial flutter     Lump In / On The Skin     Type 2 diabetes mellitus with hyperglycemia     Chronic pain syndrome     Paroxysmal Atrial Fibrillation     Fibromyalgia     Nausea     Abdominal Pain     Peptic Ulcer     COPD exacerbation     Mixed hyperlipidemia     Chronic Reflux Esophagitis     Benign Adenomatous Polyp Of The Large Intestine     Vertigo     Rotator cuff tendonitis     Generalized osteoarthrosis, involving multiple sites     Tendonitis of wrist, left     Trigger point of thoracic region     Flashers or floaters of right eye     Menopause     Tendonitis of wrist, right     Skin lesion of face     Hematoma of abdominal wall     Headache     Cervical neck pain with evidence of disc disease     Controlled substance agreement signed     Aspiration pneumonia     Type 2 diabetes mellitus with hypoglycemia     Candidal intertrigo     Osteoarthritis, hip, bilateral     Primary osteoarthritis of left knee     Osteoarthritis of both knees     Syncope     Opacity of lung on imaging study     Acute gastritis     Chronic obstructive pulmonary disease with acute lower respiratory infection     Gastroenteritis     DM neuropathy, type II diabetes mellitus     Moderate aortic stenosis     COPD (chronic obstructive pulmonary disease)     Medical History:  Past Medical History:   Diagnosis Date     Aortic stenosis      Atrial fibrillation      Atrial flutter      Benign neoplasm of adenomatous polyp     large intestine      Chronic constipation      Chronic pain syndrome      COPD (chronic obstructive pulmonary disease)     Oxygen at night      Depression      Diabetes mellitus      Dry eye syndrome      Fibromyalgia      Ganglion     left wrist     GERD (gastroesophageal reflux disease)      Hyperlipidemia      Hypertension      Hypokalemia      Larynx edema      Lung disease      Medial epicondylitis      Onychomycosis      Osteoarthritis      Otitis Externa     Created by Conversion      Peptic ulcer      Type 2  diabetes mellitus      Vulvar malignant neoplasm      Surgical History:  Past Surgical History:   Procedure Laterality Date     BREAST BIOPSY Right      BREAST BIOPSY Right 01/28/2015     BREAST BIOPSY Right 1/28/2015    Procedure: RIGHT BREAST BIOPSY AFTER WIRE LOCALIZATION AT 0940;  Surgeon: Renée Soriano MD;  Location: [facility];  Service:      HYSTERECTOMY       AL ABLATE HEART DYSRHYTHM FOCUS      Description: Catheter Ablation Atrial Fibrillation;  Recorded: 07/31/2012;  Comments: 7/24/12 PVI with Dr. Gardiner and nilay to all 5 pulm veins and CTI fl ablation line as well.     AL BIOPSY OF BREAST, INCISIONAL      Description: Incisional Breast Biopsy;  Recorded: 11/13/2007;  Comments: benign     AL SUPRACERV ABD HYSTERECTOMY      Description: Supracervical Hysterectomy;  Proc Date: 01/01/1985;  Comments: some cervix left!; ovaries intact; done for bleeding     AL TOTAL ABDOM HYSTERECTOMY      Description: Total Abdominal Hysterectomy;  Recorded: 12/31/2013;     AL TOTAL KNEE ARTHROPLASTY      Description: Total Knee Arthroplasty;  Recorded: 11/13/2007;     Social History:  Social History   Substance Use Topics     Smoking status: Light Tobacco Smoker     Types: Cigarettes     Last attempt to quit: 1/1/2014     Smokeless tobacco: Never Used      Comment: E-cig some day     Alcohol use Yes      Comment: very little        Review of Systems: Review of Systems:   General: Weight Loss  Eyes: WNL  Ears/Nose/Throat: WNL  Lungs: Cough, Wheezing  Heart: Leg Swelling  Stomach: WNL  Bladder: Frequent Urination at Night  Muscle/Joints: Joint Pain, Muscle Weakness, Muscle Pain  Skin: WNL  Nervous System: Dizziness  Mental Health: WNL     Blood: Easy Bruising      Family History:  Family History   Problem Relation Age of Onset     Heart failure Mother      Cancer Other      paternal HX-laryngeal      Alcoholism Sister      No Medical Problems Daughter      No Medical Problems Maternal Grandmother      No Medical  "Problems Maternal Grandfather      No Medical Problems Paternal Grandmother      No Medical Problems Paternal Grandfather      No Medical Problems Maternal Aunt      No Medical Problems Paternal Aunt      Alcoholism Sister      Alcoholism Brother      Alcohol abuse Father      Cancer Paternal Uncle      Gastric-Alcohol     Cancer Paternal Uncle      gastric-Alcohol     BRCA 1/2 Neg Hx      Breast cancer Neg Hx      Colon cancer Neg Hx      Endometrial cancer Neg Hx      Ovarian cancer Neg Hx          Allergies:  Allergies   Allergen Reactions     Celebrex [Celecoxib] Rash     patient had butterfly rash - \"lupus-like\"       Latex Rash     Medications:  Current Outpatient Prescriptions   Medication Sig Dispense Refill     albuterol (PROVENTIL) 2.5 mg /3 mL (0.083 %) nebulizer solution Take 3 mL (2.5 mg total) by nebulization every 4 (four) hours as needed for wheezing or shortness of breath. 75 mL 12     atorvastatin (LIPITOR) 20 MG tablet Take 1 tablet (20 mg total) by mouth bedtime. 90 tablet 3     BD INSULIN PEN NEEDLE UF SHORT 31 gauge x 5/16\" Ndle TEST FOUR TIMES DAILY WITH MEALS AND AT BEDTIME 300 each 2     blood glucose meter (GLUCOMETER) Use 1 each As Directed as needed. Dispense glucometer brand per patient's insurance at pharmacy discretion. 1 each 0     blood glucose test strips Use 1 each As Directed as needed. Dispense brand per patient's insurance at pharmacy discretion. 100 each 11     blood glucose test strips Use 1 each As Directed as needed. Dispense brand per patient's insurance at pharmacy discretion. 100 each 11     DAILY-FAYE tablet TAKE 1 TABLET BY MOUTH EVERY DAY 90 tablet 3     diltiazem (CARTIA XT) 120 MG 24 hr capsule Take 1 capsule (120 mg total) by mouth daily. 30 capsule 6     escitalopram oxalate (LEXAPRO) 10 MG tablet Take 10 mg by mouth daily.       estradiol (ESTRACE) 2 MG tablet Take 2 mg by mouth daily.       furosemide (LASIX) 20 MG tablet TAKE 1 TABLET(20 MG) BY MOUTH DAILY 30 " tablet 5     gabapentin (NEURONTIN) 100 MG capsule Take 100 mg by mouth at bedtime. 30 capsule 0     generic lancets Use 1 each As Directed 3 (three) times a day. Accu-Chek Soft Touch 100 each 11     insulin aspart (NOVOLOG FLEXPEN) 100 unit/mL injection pen Sliding scale QID (meals/bedtime): -180, 2 units; -220, 4 u; -260, 6 u; -300, 8 u; -340, 10 u; BG >340, 12 u 3 mL 3     insulin aspart (NOVOLOG FLEXPEN) 100 unit/mL injection pen Sliding scale QID (meals/bedtime): -180, 2 units; -220, 4 u; -260, 6 u; -300, 8 u; -340, 10 u; BG >340, 12 u 5 Pre-filled Pen Syringe 0     ipratropium-albuterol (DUO-NEB) 0.5-2.5 mg/3 mL nebulizer Take 3 mL by nebulization every 4 (four) hours as needed. As directed.       ipratropium-albuterol (DUO-NEB) 0.5-2.5 mg/3 mL nebulizer INHALE 1 VIAL IN NEBULIZER EVERY 4 HOURS AS NEEDED 1440 mL 0     leg brace (ELASTIC KNEE SUPPORT) Misc Use 1 each As Directed daily.  0     loperamide (IMODIUM) 2 mg capsule Take 2 capsules by mouth initially followed by 1 capsule after each loose stool bowel movement. Do not exceed 8 capsules per day (Patient taking differently: Take 2-4 mg by mouth see administration instructions. Take 2 capsules by mouth initially followed by 1 capsule after each loose stool bowel movement. Do not exceed 8 capsules per day) 30 capsule 1     meclizine (ANTIVERT) 25 mg tablet Take 1 tablet (25 mg total) by mouth 3 (three) times a day as needed for dizziness or nausea. 30 tablet 3     metFORMIN (GLUCOPHAGE) 500 MG tablet Take 1 tablet (500 mg total) by mouth 2 (two) times a day with meals. 90 tablet 2     MULTIVITAMIN (DAILY-FAYE ORAL) Take 1 tablet by mouth daily.       MULTIVITAMIN WITH MINERALS (HAIR,SKIN AND NAILS ORAL) Take 3 tablets by mouth daily. Chewable formulation.       NEBULIZER/COMPRESSOR (NEBULIZER AND COMPRESSOR MISC) Use As Directed. Use UTD       nicotine (NICODERM CQ) 21 mg/24 hr APPLY ONE PATCH ON  THE SKIN EVERY DAY 28 patch 0     nystatin (NYSTOP) powder Apply 1 application topically 2 (two) times a day as needed. 2-3 times to affected area(s). 15 g 3     olopatadine (PATANOL) 0.1 % ophthalmic solution Administer 1 drop to both eyes 2 (two) times a day as needed for allergies.        omeprazole (PRILOSEC) 20 MG capsule TAKE 1 CAPSULE(20 MG) BY MOUTH TWICE DAILY 60 capsule 0     oxyCODONE (ROXICODONE) 10 mg immediate release tablet Take 1 tablet (10 mg total) by mouth every 6 (six) hours as needed. 120 tablet 0     predniSONE (DELTASONE) 10 mg tablet 6 tabs po daily x 2 d, 5 tabs po daily x 2 d, 4 tabs po daily x 2 d,  3 tabs po daily x 2 d,  2 tabs po daily x 2 d,  1 tab po daily x 2 d 42 tablet 0     ranitidine (ZANTAC) 150 MG tablet Take 150 mg by mouth 2 (two) times a day.       senna-docusate (PERICOLACE) 8.6-50 mg tablet Take 2 tablets by mouth daily as needed for constipation. 30 tablet 1     UNABLE TO FIND Take 1 capsule by mouth daily. Med Name: Probiotic with green tea.       VENTOLIN HFA 90 mcg/actuation inhaler INHALE 1 TO 2 PUFFS BY MOUTH EVERY 4 TO 6 HOURS AS NEEDED 18 g 2     warfarin (COUMADIN) 5 MG tablet Take by mouth See Admin Instructions. 2.5 mg (half of a tablet) two days weekly (on Tuesdays and Fridays) and 5 mg (one tablet) five days weekly (on Sundays, Mondays, Wednesdays, Thursdays, and Saturdays). Adjust dose based on INR results as directed.       warfarin (COUMADIN) 5 MG tablet Take 2.5mg (1/2 tab) on Tue Thur and Sat, and 5mg (1 tab) on all other days.  OR AS DIRECTED.  Adjust dose based on INR. 75 tablet 1     budesonide-formoterol (SYMBICORT) 160-4.5 mcg/actuation inhaler Inhale 2 puffs 2 (two) times a day. 1 Inhaler 2     codeine-guaiFENesin (GUAIFENESIN AC)  mg/5 mL liquid Take 5 mL by mouth 2 (two) times a day as needed for cough. 240 mL 0     ranitidine (ZANTAC) 150 MG tablet TAKE 1 TABLET BY MOUTH TWICE DAILY 180 tablet 0     No current facility-administered  "medications for this visit.        Objective:   Vital signs:  /66 (Patient Site: Right Arm, Patient Position: Sitting, Cuff Size: Adult Large)  Pulse 92  Resp 16  Ht 5' 4.5\" (1.638 m)  Wt 204 lb (92.5 kg)  BMI 34.48 kg/m2      Physical Exam:    GENERAL APPEARANCE: Alert, cooperative and in no acute distress.  HEENT: No scleral icterus. No Xanthelasma. Oral mucuos membranes pink and moist.  NECK: JVP Nl.   CHEST: clear to auscultation  CARDIOVASCULAR: S1, S2 without clicks or rubs. Regular, regular.  2/6 systolic murmur.  Radial and posterior tibial pulses are intact and symetric.   EXTREMITIES: No cyanosis, clubbing or edema.    Results personally reviewed:  Results for orders placed during the hospital encounter of 12/22/16   Echo Complete [ECH10] 12/23/2016    Narrative   When compared to the previous study dated 11/21/2013,    Left ventricle ejection fraction is normal. The estimated left   ventricular ejection fraction is 65%.    There is global calcification of the aortic valve present. Moderate   stenosis.        2013 echo had shown mild a.s.  April 2010 nuclear stress test shows no evidence of ischemia or infarct.    TSH:   Lab Results   Component Value Date    TSH 1.3 06/03/2011     BNP:   Lab Results   Component Value Date    BNP 30 03/01/2016     BMP:  Lab Results   Component Value Date    CREATININE 0.62 02/21/2017    BUN 12 02/21/2017     02/21/2017    K 4.3 02/21/2017     02/21/2017    CO2 25 02/21/2017       This note has been dictated using voice recognition software. Any grammatical or context distortions are unintentional and inherent to the software.    ROMMEL KING RN, [facility]  [phone]            "

## 2021-06-10 NOTE — PATIENT INSTRUCTIONS - HE
Patient Education   Personalized Prevention Plan  You are due for the preventive services outlined below.  Your care team is available to assist you in scheduling these services.  If you have already completed any of these items, please share that information with your care team to update in your medical record.  Health Maintenance   Topic Date Due     SPIROMETRY  1950     COPD ACTION PLAN  1950     HEPATITIS B VACCINES (1 of 3 - Risk 3-dose series) 01/13/1969     MEDICARE ANNUAL WELLNESS VISIT  01/13/2015     DXA SCAN  01/13/2015     ADVANCE CARE PLANNING  05/17/2015     MAMMOGRAM  01/06/2017     DIABETIC FOOT EXAM  08/22/2018     DIABETIC EYE EXAM  01/04/2019     INFLUENZA VACCINE RULE BASED (1) 08/01/2020     A1C  12/22/2020     FALL RISK ASSESSMENT  01/17/2021     BMP  06/22/2021     LIPID  06/22/2021     MICROALBUMIN  06/22/2021     COLORECTAL CANCER SCREENING  06/14/2029     TD 18+ HE  08/16/2029     DEPRESSION ACTION PLAN  Completed     HEPATITIS C SCREENING  Completed     PNEUMOCOCCAL IMMUNIZATION 65+ LOW/MEDIUM RISK  Completed     ZOSTER VACCINES  Completed

## 2021-06-10 NOTE — PROGRESS NOTES
ASSESSMENT/PLAN:  1. Trigger point of thoracic region     2. DM neuropathy, type II diabetes mellitus  gabapentin (NEURONTIN) 100 MG capsule   3. Chronic pain syndrome  oxyCODONE (ROXICODONE) 10 mg immediate release tablet   4. Vertigo  meclizine (ANTIVERT) 25 mg tablet   5. Paroxysmal atrial fibrillation  INR       68 yo female here for:  1.  Trigger point injections - see procedure note.  2.  Chronic pain syndrome - related to fibromyalgia, arthritis - uses Oxycodone - no recent increase in therapy, no signs of misuse  3.  DM Neuropathy - using Gabapentin, low dose  4.  Vertigo - uses Meclizine for symptom control - will refill today - no new symptoms  5.  A Fib - needs INR check - followed by anticoaguation clinic          Medications Discontinued During This Encounter   Medication Reason     Oxycodone HCl 10 mg Tab Reorder     meclizine (ANTIVERT) 25 mg tablet Reorder     There are no Patient Instructions on file for this visit.    Chief Complaint:  Chief Complaint   Patient presents with     Follow-up     Medication Questions     pain meds,      Diabetes     check feet       HPI:   Mary Kay Tejada is a 67 y.o. female c/o  1.  Trigger points - desires injection  2.  DM II - reports stable sugars - doesn't want blood work today  3.  Pain med - needs renew - it's due currently      PMH:   Patient Active Problem List    Diagnosis Date Noted     Moderate aortic stenosis 05/23/2017     COPD (chronic obstructive pulmonary disease) 05/23/2017     DM neuropathy, type II diabetes mellitus 05/19/2017     Chronic obstructive pulmonary disease with acute lower respiratory infection 12/24/2016     Gastroenteritis 12/24/2016     Syncope 12/22/2016     Opacity of lung on imaging study 12/22/2016     Acute gastritis 12/22/2016     Osteoarthritis of both knees 08/22/2016     Osteoarthritis, hip, bilateral 06/20/2016     Primary osteoarthritis of left knee 06/20/2016     Candidal intertrigo 05/11/2016     Type 2 diabetes  mellitus with hypoglycemia      Aspiration pneumonia 03/01/2016     Controlled substance agreement signed 11/23/2015     Cervical neck pain with evidence of disc disease 07/22/2015     Headache 07/17/2015     Hematoma of abdominal wall 07/05/2015     Skin lesion of face 05/22/2015     Tendonitis of wrist, right 04/22/2015     Menopause 04/06/2015     Flashers or floaters of right eye 02/23/2015     Tendonitis of wrist, left 01/21/2015     Trigger point of thoracic region 01/21/2015     Generalized osteoarthrosis, involving multiple sites 01/14/2015     Vertigo 12/17/2014     Rotator cuff tendonitis 12/17/2014     Abnormal Weight Loss      Osteoarthritis Of The Shoulder      Chronic Constipation      Onychomycosis Of The Toenails      Diarrhea      Ganglion Of The Left Wrist      Larynx Edema      Obesity      Malignant Vulvar Neoplasm      Medial Epicondylitis      Skin Lesion      Urinary Incontinence      Chronic Major Depression      Dry Eye Syndrome      Nicotine Dependence      Hypokalemia      Essential hypertension      Muscle Cramps In The Calf      Edema      Atrial flutter      Lump In / On The Skin      Type 2 diabetes mellitus with hyperglycemia      Chronic pain syndrome      Paroxysmal Atrial Fibrillation      Fibromyalgia      Nausea      Abdominal Pain      Peptic Ulcer      COPD exacerbation      Mixed hyperlipidemia      Chronic Reflux Esophagitis      Benign Adenomatous Polyp Of The Large Intestine      Past Medical History:   Diagnosis Date     Aortic stenosis      Atrial fibrillation      Atrial flutter      Benign neoplasm of adenomatous polyp     large intestine      Chronic constipation      Chronic pain syndrome      COPD (chronic obstructive pulmonary disease)     Oxygen at night      Depression      Diabetes mellitus      Dry eye syndrome      Fibromyalgia      Ganglion     left wrist     GERD (gastroesophageal reflux disease)      Hyperlipidemia      Hypertension      Hypokalemia       Larynx edema      Lung disease      Medial epicondylitis      Onychomycosis      Osteoarthritis      Otitis Externa     Created by Conversion      Peptic ulcer      Type 2 diabetes mellitus      Vulvar malignant neoplasm      Past Surgical History:   Procedure Laterality Date     BREAST BIOPSY Right      BREAST BIOPSY Right 01/28/2015     BREAST BIOPSY Right 1/28/2015    Procedure: RIGHT BREAST BIOPSY AFTER WIRE LOCALIZATION AT 0940;  Surgeon: Renée Soriano MD;  Location: [facility] [place];  Service:      HYSTERECTOMY       CT ABLATE HEART DYSRHYTHM FOCUS      Description: Catheter Ablation Atrial Fibrillation;  Recorded: 07/31/2012;  Comments: 7/24/12 PVI with Dr. Gardiner and nilay to all 5 pulm veins and CTI fl ablation line as well.     CT BIOPSY OF BREAST, INCISIONAL      Description: Incisional Breast Biopsy;  Recorded: 11/13/2007;  Comments: benign     CT SUPRACERV ABD HYSTERECTOMY      Description: Supracervical Hysterectomy;  Proc Date: 01/01/1985;  Comments: some cervix left!; ovaries intact; done for bleeding     CT TOTAL ABDOM HYSTERECTOMY      Description: Total Abdominal Hysterectomy;  Recorded: 12/31/2013;     CT TOTAL KNEE ARTHROPLASTY      Description: Total Knee Arthroplasty;  Recorded: 11/13/2007;     Social History     Social History     Marital status:      Spouse name: N/A     Number of children: N/A     Years of education: N/A     Occupational History     Not on file.     Social History Main Topics     Smoking status: Light Tobacco Smoker     Types: Cigarettes     Last attempt to quit: 1/1/2014     Smokeless tobacco: Never Used      Comment: E-cig some day     Alcohol use Yes      Comment: very little     Drug use: No     Sexual activity: Not on file     Other Topics Concern     Not on file     Social History Narrative       Meds:    Current Outpatient Prescriptions:      albuterol (PROVENTIL) 2.5 mg /3 mL (0.083 %) nebulizer solution, Take 3 mL (2.5 mg total) by nebulization every 4  "(four) hours as needed for wheezing or shortness of breath., Disp: 75 mL, Rfl: 12     atorvastatin (LIPITOR) 20 MG tablet, Take 1 tablet (20 mg total) by mouth bedtime., Disp: 90 tablet, Rfl: 3     BD INSULIN PEN NEEDLE UF SHORT 31 gauge x 5/16\" Ndle, TEST FOUR TIMES DAILY WITH MEALS AND AT BEDTIME, Disp: 300 each, Rfl: 2     blood glucose meter (GLUCOMETER), Use 1 each As Directed as needed. Dispense glucometer brand per patient's insurance at pharmacy discretion., Disp: 1 each, Rfl: 0     blood glucose test strips, Use 1 each As Directed as needed. Dispense brand per patient's insurance at pharmacy discretion., Disp: 100 each, Rfl: 11     blood glucose test strips, Use 1 each As Directed as needed. Dispense brand per patient's insurance at pharmacy discretion., Disp: 100 each, Rfl: 11     budesonide-formoterol (SYMBICORT) 160-4.5 mcg/actuation inhaler, Inhale 2 puffs 2 (two) times a day., Disp: 1 Inhaler, Rfl: 2     codeine-guaiFENesin (GUAIFENESIN AC)  mg/5 mL liquid, Take 5 mL by mouth 2 (two) times a day as needed for cough., Disp: 240 mL, Rfl: 0     DAILY-FAYE tablet, TAKE 1 TABLET BY MOUTH EVERY DAY, Disp: 90 tablet, Rfl: 3     diltiazem (CARTIA XT) 120 MG 24 hr capsule, Take 1 capsule (120 mg total) by mouth daily., Disp: 30 capsule, Rfl: 6     escitalopram oxalate (LEXAPRO) 10 MG tablet, Take 10 mg by mouth daily., Disp: , Rfl:      estradiol (ESTRACE) 2 MG tablet, Take 2 mg by mouth daily., Disp: , Rfl:      furosemide (LASIX) 20 MG tablet, TAKE 1 TABLET(20 MG) BY MOUTH DAILY, Disp: 30 tablet, Rfl: 5     generic lancets, Use 1 each As Directed 3 (three) times a day. Accu-Chek Soft Touch, Disp: 100 each, Rfl: 11     insulin aspart (NOVOLOG FLEXPEN) 100 unit/mL injection pen, Sliding scale QID (meals/bedtime): -180, 2 units; -220, 4 u; -260, 6 u; -300, 8 u; -340, 10 u; BG >340, 12 u, Disp: 3 mL, Rfl: 3     insulin aspart (NOVOLOG FLEXPEN) 100 unit/mL injection pen, Sliding " scale QID (meals/bedtime): -180, 2 units; -220, 4 u; -260, 6 u; -300, 8 u; -340, 10 u; BG >340, 12 u, Disp: 5 Pre-filled Pen Syringe, Rfl: 0     ipratropium-albuterol (DUO-NEB) 0.5-2.5 mg/3 mL nebulizer, Take 3 mL by nebulization every 4 (four) hours as needed. As directed., Disp: , Rfl:      ipratropium-albuterol (DUO-NEB) 0.5-2.5 mg/3 mL nebulizer, INHALE 1 VIAL IN NEBULIZER EVERY 4 HOURS AS NEEDED, Disp: 1440 mL, Rfl: 0     leg brace (ELASTIC KNEE SUPPORT) Misc, Use 1 each As Directed daily., Disp: , Rfl: 0     loperamide (IMODIUM) 2 mg capsule, Take 2 capsules by mouth initially followed by 1 capsule after each loose stool bowel movement. Do not exceed 8 capsules per day (Patient taking differently: Take 2-4 mg by mouth see administration instructions. Take 2 capsules by mouth initially followed by 1 capsule after each loose stool bowel movement. Do not exceed 8 capsules per day), Disp: 30 capsule, Rfl: 1     meclizine (ANTIVERT) 25 mg tablet, Take 1 tablet (25 mg total) by mouth 3 (three) times a day as needed for dizziness or nausea., Disp: 30 tablet, Rfl: 3     metFORMIN (GLUCOPHAGE) 500 MG tablet, Take 1 tablet (500 mg total) by mouth 2 (two) times a day with meals., Disp: 90 tablet, Rfl: 2     MULTIVITAMIN (DAILY-FAYE ORAL), Take 1 tablet by mouth daily., Disp: , Rfl:      MULTIVITAMIN WITH MINERALS (HAIR,SKIN AND NAILS ORAL), Take 3 tablets by mouth daily. Chewable formulation., Disp: , Rfl:      NEBULIZER/COMPRESSOR (NEBULIZER AND COMPRESSOR MISC), Use As Directed. Use UTD, Disp: , Rfl:      nicotine (NICODERM CQ) 21 mg/24 hr, APPLY ONE PATCH ON THE SKIN EVERY DAY, Disp: 28 patch, Rfl: 0     nystatin (NYSTOP) powder, Apply 1 application topically 2 (two) times a day as needed. 2-3 times to affected area(s)., Disp: 15 g, Rfl: 3     olopatadine (PATANOL) 0.1 % ophthalmic solution, Administer 1 drop to both eyes 2 (two) times a day as needed for allergies. , Disp: , Rfl:       "omeprazole (PRILOSEC) 20 MG capsule, TAKE 1 CAPSULE(20 MG) BY MOUTH TWICE DAILY, Disp: 60 capsule, Rfl: 0     oxyCODONE (ROXICODONE) 10 mg immediate release tablet, Take 1 tablet (10 mg total) by mouth every 6 (six) hours as needed., Disp: 120 tablet, Rfl: 0     predniSONE (DELTASONE) 10 mg tablet, 6 tabs po daily x 2 d, 5 tabs po daily x 2 d, 4 tabs po daily x 2 d,  3 tabs po daily x 2 d,  2 tabs po daily x 2 d,  1 tab po daily x 2 d, Disp: 42 tablet, Rfl: 0     ranitidine (ZANTAC) 150 MG tablet, Take 150 mg by mouth 2 (two) times a day., Disp: , Rfl:      ranitidine (ZANTAC) 150 MG tablet, TAKE 1 TABLET BY MOUTH TWICE DAILY, Disp: 180 tablet, Rfl: 0     senna-docusate (PERICOLACE) 8.6-50 mg tablet, Take 2 tablets by mouth daily as needed for constipation., Disp: 30 tablet, Rfl: 1     UNABLE TO FIND, Take 1 capsule by mouth daily. Med Name: Probiotic with green tea., Disp: , Rfl:      VENTOLIN HFA 90 mcg/actuation inhaler, INHALE 1 TO 2 PUFFS BY MOUTH EVERY 4 TO 6 HOURS AS NEEDED, Disp: 18 g, Rfl: 2     warfarin (COUMADIN) 5 MG tablet, Take by mouth See Admin Instructions. 2.5 mg (half of a tablet) two days weekly (on Tuesdays and Fridays) and 5 mg (one tablet) five days weekly (on Sundays, Mondays, Wednesdays, Thursdays, and Saturdays). Adjust dose based on INR results as directed., Disp: , Rfl:      warfarin (COUMADIN) 5 MG tablet, Take 2.5mg (1/2 tab) on Tue Thur and Sat, and 5mg (1 tab) on all other days.  OR AS DIRECTED.  Adjust dose based on INR., Disp: 75 tablet, Rfl: 1     gabapentin (NEURONTIN) 100 MG capsule, Take 100 mg by mouth at bedtime., Disp: 30 capsule, Rfl: 0    Allergies:  Allergies   Allergen Reactions     Celebrex [Celecoxib] Rash     patient had butterfly rash - \"lupus-like\"       Latex Rash       ROS:  Pertinent positives as noted in HPI; otherwise 12 point ROS negative.      Physical Exam:  EXAM:  /58 (Patient Site: Left Arm, Patient Position: Sitting, Cuff Size: Adult Regular)  Pulse " "84  Temp 97.7  F (36.5  C) (Oral)   Resp 20  Ht 5' 5.5\" (1.664 m)  Wt 204 lb (92.5 kg)  SpO2 95%  BMI 33.43 kg/m2   Gen:  NAD, appears well, well-hydrated  HEENT:  TMs nl, oropharynx benign, nasal mucosa nl, conjunctiva clear  Neck:  Supple, no adenopathy, tenderness to palpation at paracervical muscles, R>L; trigger points in right suprascapular/parathoracic muscles; no thyromegaly, no carotid bruits, no JVD  Lungs:  Clear to auscultation bilaterally  Cor:  RRR no murmur  Abd:  Soft, nontender, BS+, no masses, no guarding or rebound, no HSM  Extr:  Neg.  Feet:  Mild deformities with angulation of toes, minor callus formation,   Neuro:  No asymmetry  Skin:  Warm/dry        Results:  Results for orders placed or performed in visit on 05/19/17   INR   Result Value Ref Range    INR 3.20 (H) 0.90 - 1.10         Procedure Note - Trigger Point Injections    Consent obtained: verbal    Indication:  Pain associated with fibromyalgia/trigger points    5 trigger points identified.  Each trigger point swabbed x 3 with Betadine swab.  Each trigger point then injected with 2 ml of 1% Lidocaine (no epi).    Total volume injected:  10 ml    Complications:  None, patient tolerated procedure well.    Plan:  Discussed care with patient.  RTC for further injections in 30 days prn.        "

## 2021-06-10 NOTE — PROGRESS NOTES
ASSESSMENT/PLAN:  1. Trigger point of thoracic region     2. Paroxysmal atrial fibrillation  INR   3. Chronic pain syndrome  Oxycodone HCl 10 mg Tab   4. Fibromyalgia     5. Primary osteoarthritis of both hips  Ambulatory referral to Orthopedics   6. Primary osteoarthritis of both knees  Ambulatory referral to Orthopedics       66 yo female here for:  1.  Trigger point injections - see procedure note  2.  Paroxysmal AFib - needs INR - followed by anticoag team  3.  Chronic pain syndrome - refill pain meds  4.  Osteoarthritis of hips/knees - patient with increasing pain in hips and knees - desires more intensive therapy presently.  Will refer to Ortho.         Medications Discontinued During This Encounter   Medication Reason     Oxycodone HCl 10 mg Tab Reorder     There are no Patient Instructions on file for this visit.    Chief Complaint:  Chief Complaint   Patient presents with     Injections     trigger point injections       HPI:   Mary Kay Tejada is a 67 y.o. female c/o  1.  Trigger point pain - here for injections  2.  Pain syndrome - needs refill on meds - no increase in use.  3.  C/o pain in knees - arthritis syndrome is worsening.  Ready to contemplate doing surgery or other intervewntions to make pain improve.  Will refer to Ortho.      PMH:   Patient Active Problem List    Diagnosis Date Noted     Chronic obstructive pulmonary disease with acute lower respiratory infection 12/24/2016     Gastroenteritis 12/24/2016     Syncope 12/22/2016     Opacity of lung on imaging study 12/22/2016     Acute gastritis 12/22/2016     Osteoarthritis of both knees 08/22/2016     Osteoarthritis, hip, bilateral 06/20/2016     Primary osteoarthritis of left knee 06/20/2016     Candidal intertrigo 05/11/2016     Type 2 diabetes mellitus with hypoglycemia      Aspiration pneumonia 03/01/2016     Controlled substance agreement signed 11/23/2015     Cervical neck pain with evidence of disc disease 07/22/2015     Headache  07/17/2015     Hematoma of abdominal wall 07/05/2015     Skin lesion of face 05/22/2015     Tendonitis of wrist, right 04/22/2015     Menopause 04/06/2015     Flashers or floaters of right eye 02/23/2015     Tendonitis of wrist, left 01/21/2015     Trigger point of thoracic region 01/21/2015     Generalized osteoarthrosis, involving multiple sites 01/14/2015     Vertigo 12/17/2014     Rotator cuff tendonitis 12/17/2014     Aortic Stenosis      Abnormal Weight Loss      Osteoarthritis Of The Shoulder      Chronic Constipation      Onychomycosis Of The Toenails      Diarrhea      Ganglion Of The Left Wrist      Larynx Edema      Obesity      Malignant Vulvar Neoplasm      Medial Epicondylitis      Skin Lesion      Urinary Incontinence      Chronic Major Depression      Dry Eye Syndrome      Nicotine Dependence      Hypokalemia      Essential hypertension      Muscle Cramps In The Calf      Edema      Atrial flutter      Lump In / On The Skin      Type 2 diabetes mellitus with hyperglycemia      Chronic pain syndrome      Paroxysmal Atrial Fibrillation      Fibromyalgia      Nausea      Abdominal Pain      Peptic Ulcer      COPD exacerbation      Mixed hyperlipidemia      Chronic Reflux Esophagitis      Benign Adenomatous Polyp Of The Large Intestine      Past Medical History:   Diagnosis Date     Aortic stenosis      Atrial fibrillation      Atrial flutter      Benign neoplasm of adenomatous polyp     large intestine      Chronic constipation      Chronic pain syndrome      COPD (chronic obstructive pulmonary disease)     Oxygen at night      Depression      Diabetes mellitus      Dry eye syndrome      Fibromyalgia      Ganglion     left wrist     GERD (gastroesophageal reflux disease)      Hyperlipidemia      Hypertension      Hypokalemia      Larynx edema      Lung disease      Medial epicondylitis      Onychomycosis      Osteoarthritis      Otitis Externa     Created by Conversion      Peptic ulcer      Type 2  diabetes mellitus      Vulvar malignant neoplasm      Past Surgical History:   Procedure Laterality Date     BREAST BIOPSY Right      BREAST BIOPSY Right 01/28/2015     BREAST BIOPSY Right 1/28/2015    Procedure: RIGHT BREAST BIOPSY AFTER WIRE LOCALIZATION AT 0940;  Surgeon: Renée Soriano MD;  Location: [facility];  Service:      HYSTERECTOMY       TN ABLATE HEART DYSRHYTHM FOCUS      Description: Catheter Ablation Atrial Fibrillation;  Recorded: 07/31/2012;  Comments: 7/24/12 PVI with Dr. Gardiner and nilay to all 5 pulm veins and CTI fl ablation line as well.     TN BIOPSY OF BREAST, INCISIONAL      Description: Incisional Breast Biopsy;  Recorded: 11/13/2007;  Comments: benign     TN SUPRACERV ABD HYSTERECTOMY      Description: Supracervical Hysterectomy;  Proc Date: 01/01/1985;  Comments: some cervix left!; ovaries intact; done for bleeding     TN TOTAL ABDOM HYSTERECTOMY      Description: Total Abdominal Hysterectomy;  Recorded: 12/31/2013;     TN TOTAL KNEE ARTHROPLASTY      Description: Total Knee Arthroplasty;  Recorded: 11/13/2007;     Social History     Social History     Marital status:      Spouse name: N/A     Number of children: N/A     Years of education: N/A     Occupational History     Not on file.     Social History Main Topics     Smoking status: Light Tobacco Smoker     Types: Cigarettes     Last attempt to quit: 1/1/2014     Smokeless tobacco: Never Used      Comment: E-cig some day     Alcohol use Yes      Comment: very little     Drug use: No     Sexual activity: Not on file     Other Topics Concern     Not on file     Social History Narrative       Meds:    Current Outpatient Prescriptions:      albuterol (PROVENTIL) 2.5 mg /3 mL (0.083 %) nebulizer solution, Take 3 mL (2.5 mg total) by nebulization every 4 (four) hours as needed for wheezing or shortness of breath., Disp: 75 mL, Rfl: 12     aspirin 81 MG EC tablet, Take 81 mg by mouth daily., Disp: , Rfl:      atorvastatin  (LIPITOR) 20 MG tablet, Take 1 tablet (20 mg total) by mouth bedtime., Disp: 90 tablet, Rfl: 3     blood glucose meter (GLUCOMETER), Use 1 each As Directed as needed. Dispense glucometer brand per patient's insurance at pharmacy discretion., Disp: 1 each, Rfl: 0     blood glucose test strips, Use 1 each As Directed as needed. Dispense brand per patient's insurance at pharmacy discretion., Disp: 100 each, Rfl: 11     blood glucose test strips, Use 1 each As Directed as needed. Dispense brand per patient's insurance at pharmacy discretion., Disp: 100 each, Rfl: 11     budesonide-formoterol (SYMBICORT) 160-4.5 mcg/actuation inhaler, Inhale 2 puffs 2 (two) times a day., Disp: 1 Inhaler, Rfl: 2     DAILY-FAYE tablet, TAKE 1 TABLET BY MOUTH EVERY DAY, Disp: 90 tablet, Rfl: 3     diltiazem (CARTIA XT) 120 MG 24 hr capsule, Take 1 capsule (120 mg total) by mouth daily., Disp: 30 capsule, Rfl: 6     escitalopram oxalate (LEXAPRO) 10 MG tablet, Take 10 mg by mouth daily., Disp: , Rfl:      escitalopram oxalate (LEXAPRO) 10 MG tablet, TAKE 1 TABLET BY MOUTH EVERY DAY, Disp: 30 tablet, Rfl: 0     estradiol (ESTRACE) 2 MG tablet, Take 2 mg by mouth daily., Disp: , Rfl:      estradiol (ESTRACE) 2 MG tablet, TAKE 1 TABLET BY MOUTH EVERY DAY, Disp: 90 tablet, Rfl: 0     furosemide (LASIX) 20 MG tablet, Take 20 mg by mouth daily as needed., Disp: , Rfl:      furosemide (LASIX) 20 MG tablet, TAKE 1 TABLET(20 MG) BY MOUTH DAILY, Disp: 30 tablet, Rfl: 5     generic lancets, Use 1 each As Directed 3 (three) times a day. Accu-Chek Soft Touch, Disp: 100 each, Rfl: 11     insulin aspart (NOVOLOG FLEXPEN) 100 unit/mL injection pen, Sliding scale QID (meals/bedtime): -180, 2 units; -220, 4 u; -260, 6 u; -300, 8 u; -340, 10 u; BG >340, 12 u, Disp: 3 mL, Rfl: 3     insulin aspart (NOVOLOG FLEXPEN) 100 unit/mL injection pen, Sliding scale QID (meals/bedtime): -180, 2 units; -220, 4 u; -260, 6 u;  -300, 8 u; -340, 10 u; BG >340, 12 u, Disp: 5 Pre-filled Pen Syringe, Rfl: 0     ipratropium-albuterol (DUO-NEB) 0.5-2.5 mg/3 mL nebulizer, Take 3 mL by nebulization every 4 (four) hours as needed. As directed., Disp: , Rfl:      ipratropium-albuterol (DUO-NEB) 0.5-2.5 mg/3 mL nebulizer, INHALE 1 VIAL IN NEBULIZER EVERY 4 HOURS AS NEEDED, Disp: 1440 mL, Rfl: 0     leg brace (ELASTIC KNEE SUPPORT) Misc, Use 1 each As Directed daily., Disp: , Rfl: 0     loperamide (IMODIUM) 2 mg capsule, Take 2 capsules by mouth initially followed by 1 capsule after each loose stool bowel movement. Do not exceed 8 capsules per day (Patient taking differently: Take 2-4 mg by mouth see administration instructions. Take 2 capsules by mouth initially followed by 1 capsule after each loose stool bowel movement. Do not exceed 8 capsules per day), Disp: 30 capsule, Rfl: 1     meclizine (ANTIVERT) 25 mg tablet, Take 1 tablet (25 mg total) by mouth 3 (three) times a day as needed for dizziness or nausea., Disp: 30 tablet, Rfl: 3     metFORMIN (GLUCOPHAGE) 500 MG tablet, Take 1 tablet (500 mg total) by mouth 2 (two) times a day with meals., Disp: 90 tablet, Rfl: 2     MULTIVITAMIN (DAILY-FAYE ORAL), Take 1 tablet by mouth daily., Disp: , Rfl:      MULTIVITAMIN WITH MINERALS (HAIR,SKIN AND NAILS ORAL), Take 3 tablets by mouth daily. Chewable formulation., Disp: , Rfl:      NEBULIZER/COMPRESSOR (NEBULIZER AND COMPRESSOR MISC), Use As Directed. Use UTD, Disp: , Rfl:      nicotine (NICODERM CQ) 21 mg/24 hr, APPLY ONE PATCH ON THE SKIN EVERY DAY, Disp: 28 patch, Rfl: 0     nystatin (NYSTOP) powder, Apply 1 application topically 2 (two) times a day as needed. 2-3 times to affected area(s)., Disp: 15 g, Rfl: 3     olopatadine (PATANOL) 0.1 % ophthalmic solution, Administer 1 drop to both eyes 2 (two) times a day as needed for allergies. , Disp: , Rfl:      omeprazole (PRILOSEC) 20 MG capsule, TAKE 1 CAPSULE(20 MG) BY MOUTH TWICE DAILY,  "Disp: 60 capsule, Rfl: 0     Oxycodone HCl 10 mg Tab, Take 10 mg by mouth every 6 (six) hours as needed., Disp: 120 each, Rfl: 0     pen needle, diabetic 31 gauge x 5/16\" Ndle, Use 1 each As Directed 4 (four) times a day with meals and bedtime., Disp: 300 each, Rfl: 0     predniSONE (DELTASONE) 10 MG tablet, 6 tabs po daily x 2 d, 5 tabs po daily x 2 d, 4 tabs po daily x 2 d,  3 tabs po daily x 2 d,  2 tabs po daily x 2 d,  1 tab po daily x 2 d, Disp: 42 tablet, Rfl: 0     ranitidine (ZANTAC) 150 MG tablet, Take 150 mg by mouth 2 (two) times a day., Disp: , Rfl:      senna-docusate (PERICOLACE) 8.6-50 mg tablet, Take 2 tablets by mouth daily as needed for constipation., Disp: 30 tablet, Rfl: 1     UNABLE TO FIND, Take 1 capsule by mouth daily. Med Name: Probiotic with green tea., Disp: , Rfl:      VENTOLIN HFA 90 mcg/actuation inhaler, INHALE 1 TO 2 PUFFS BY MOUTH EVERY 4 TO 6 HOURS AS NEEDED, Disp: 1 Inhaler, Rfl: 0     warfarin (COUMADIN) 5 MG tablet, Take by mouth See Admin Instructions. 2.5 mg (half of a tablet) two days weekly (on Tuesdays and Fridays) and 5 mg (one tablet) five days weekly (on Sundays, Mondays, Wednesdays, Thursdays, and Saturdays). Adjust dose based on INR results as directed., Disp: , Rfl:      warfarin (COUMADIN) 5 MG tablet, Take 2.5mg (1/2 tab) on Tue Thur and Sat, and 5mg (1 tab) on all other days.  OR AS DIRECTED.  Adjust dose based on INR., Disp: 75 tablet, Rfl: 1    Allergies:  Allergies   Allergen Reactions     Celebrex [Celecoxib] Rash     patient had butterfly rash - \"lupus-like\"       Latex Rash       ROS:  Pertinent positives as noted in HPI; otherwise 12 point ROS negative.      Physical Exam:  EXAM:  /60  Pulse 80  Temp 98.2  F (36.8  C) (Oral)   Resp 14  Wt 204 lb 12.8 oz (92.9 kg)  BMI 33.56 kg/m2   Gen:  NAD, appears well, well-hydrated  HEENT:  TMs nl, oropharynx benign, nasal mucosa nl, conjunctiva clear  Neck:  Supple, no adenopathy, no thyromegaly, no carotid " bruits, no JVD  Lungs:  Clear to auscultation bilaterally  Cor:  RRR no murmur  Abd:  Soft, nontender, BS+, no masses, no guarding or rebound, no HSM  Extr:  Neg., multiple trigger point, R>L in suprascapular  Neuro:  No asymmetry  Skin:  Warm/dry        Results:  Results for orders placed or performed in visit on 04/19/17   INR   Result Value Ref Range    INR 2.70 (H) 0.90 - 1.10         Procedure Note - Trigger Point Injections    Consent obtained: verbal    Indication: pain, fibromyalgia    5 trigger points identified.  Each trigger point swabbed x 3 with Betadine swab.  Each trigger point then injected with 2 ml of 1% Lidocaine (no epi).    Total volume injected:  10 ml    Complications:  None, patient tolerated procedure well.    Plan:  Discussed care with patient.  RTC for further injections in 30 days prn.

## 2021-06-10 NOTE — TELEPHONE ENCOUNTER
ANTICOAGULATION  MANAGEMENT    Assessment     Today's INR result of 1.6 is Subtherapeutic (goal INR of 2.0-3.0)        Less warfarin taken than instructed which may be affecting INR    Increased greens/vitamin K intake may be affecting INR    No new medication/supplements affecting INR    Continues to tolerate warfarin with no reported s/s of bleeding or thromboembolism     Previous INR was Subtherapeutic    Plan:     Spoke on phone with Mary Kay regarding INR result and instructed:     Warfarin Dosing Instructions:  Take 7.5 mg today then change warfarin dose to 2.5 mg daily on Sundays and Thursdays; and 5 mg daily rest of week  (9.1 % change)    Instructed patient to follow up no later than: two weeks    Education provided: importance of consistent vitamin K intake, impact of vitamin K foods on INR, importance of therapeutic range, target INR goal and significance of current INR result, importance of following up for INR monitoring at instructed interval, importance of taking warfarin as instructed, monitoring for clotting signs and symptoms and when to seek medical attention/emergency care    Mary Kay verbalizes understanding and agrees to warfarin dosing plan.    Instructed to call the [facility] for any changes, questions or concerns. (#[phone])   ?   Mena Dumont RN    Subjective/Objective:      Mary Kaydilma Tejada, a 70 y.o. female is on warfarin.     Mary Kay reports:     Home warfarin dose: verbally confirmed home dose with Lety and updated on anticoagulation calendar     Missed doses: No     Medication changes:  No     S/S of bleeding or thromboembolism:  No     New Injury or illness:  No     Changes in diet or alcohol consumption:  Yes-she has been eating more greens     Upcoming surgery, procedure or cardioversion:  No    Anticoagulation Episode Summary     Current INR goal:   2.0-3.0   TTR:   40.5 % (1 y)   Next INR check:   8/31/2020   INR from last check:   1.60! (8/17/2020)    Weekly max warfarin dose:      Target end date:   Indefinite   INR check location:      Preferred lab:      Send INR reminders to:   [facility]    Indications    Paroxysmal Atrial Fibrillation [I48.91]           Comments:            Anticoagulation Care Providers     Provider Role Specialty Phone number    Cammy Bui MD Referring Family Medicine [phone]

## 2021-06-11 NOTE — TELEPHONE ENCOUNTER
"Pt calls to request \"antibiotics and prednisone.\"  \"Sinuses are horrible from the change in the weather.\"  \"Now going into the green stages.\"    Symptoms onset 24 hours ago.  \"One nostril became plugged.\"  Pt's voice currently exhibits moderate to severe hoarseness.    Pt uses Home O2 -> currently on 5L.  States \"trying to cut it down, but now can't.\"    No fevers.  No ear pain.  Denies frontal headache.  States \"just plugged sinuses.\"    Pt agrees to schedule telephone provider visit.  Warm transferred to a  for this purpose now.    Salima Pearson RN  [facility] Triage     Reason for Disposition    Patient wants to be seen    Additional Information    Negative: Sounds like a life-threatening emergency to the triager    Negative: Difficulty breathing, and not from stuffy nose (e.g., not relieved by cleaning out the nose)    Negative: SEVERE headache and has fever    Negative: Patient sounds very sick or weak to the triager    Negative: SEVERE sinus pain    Negative: Severe headache    Negative: Redness or swelling on the cheek, forehead, or around the eye    Negative: Fever > 103 F (39.4 C)    Negative: Fever > 101 F (38.3 C) and over 60 years of age    Negative: Fever > 100.0 F (37.8 C) and has diabetes mellitus or a weak immune system (e.g., HIV positive, cancer chemotherapy, organ transplant, splenectomy, chronic steroids)    Negative: Fever > 100.0 F (37.8 C) and bedridden (e.g., nursing home patient, stroke, chronic illness, recovering from surgery)    Negative: Fever present > 3 days (72 hours)    Negative: Fever returns after gone for over 24 hours and symptoms worse or not improved    Negative: Sinus pain (not just congestion) and fever    Negative: Earache    Negative: Sinus congestion (pressure, fullness) present > 10 days    Negative: Nasal discharge present > 10 days    Negative: Using nasal washes and pain medicine > 24 hours and sinus pain (lower forehead, cheekbone, or eye) persists    " Negative: Lots of coughing    Protocols used: SINUS PAIN AND CONGESTION-A-OH    __________________________    No suspicion of covid symptoms.  Provided precautionary measures per current protocols:  COVID 19 Nurse Triage Plan/Patient Instructions    Please be aware that novel coronavirus (COVID-19) may be circulating in the community. If you develop symptoms such as fever, cough, or SOB or if you have concerns about the presence of another infection including coronavirus (COVID-19), please contact your health care provider or visit [email or web address].     Disposition/Instructions    Additional COVID19 information to add for patients.   How can I protect others?  If you have symptoms (fever, cough, body aches or trouble breathing): Stay home and away from others (self-isolate) until:    At least 10 days have passed since your symptoms started. And     You ve had no fever--and no medicine that reduces fever--for 1 full day (24 hours). And      Your other symptoms have resolved (gotten better).     If you don t have symptoms, but a test showed that you have COVID-19 (you tested positive):    Stay home and away from others (self-isolate) until at least 10 days have passed since the date of your first positive COVID-19 test.    During this time:    Stay in your own room, even for meals. Use your own bathroom if you can.     Stay away from others in your home. No hugging, kissing or shaking hands. No visitors.    Don t go to work, school or anywhere else.     Clean  high touch  surfaces often (doorknobs, counters, handles, etc.). Use a household cleaning spray or wipes. You ll find a full list on the EPA website:  www.epa.gov/pesticide-registration/list-n-disinfectants-use-against-sars-cov-2.    Cover your mouth and nose with a mask, tissue or washcloth to avoid spreading germs.    Wash your hands and face often. Use soap and water.    Caregivers in these groups are at risk for severe illness due to COVID-19:  o People 65  years and older  o People who live in a nursing home or long-term care facility  o People with chronic disease (lung, heart, cancer, diabetes, kidney, liver, immunologic)  o People who have a weakened immune system, including those who:  - Are in cancer treatment  - Take medicine that weakens the immune system, such as corticosteroids  - Had a bone marrow or organ transplant  - Have an immune deficiency  - Have poorly controlled HIV or AIDS  - Are obese (body mass index of 40 or higher)  - Smoke regularly    Caregivers should wear gloves while washing dishes, handling laundry and cleaning bedrooms and bathrooms.    Use caution when washing and drying laundry: Don t shake dirty laundry, and use the warmest water setting that you can.    For more tips, go to www.cdc.gov/coronavirus/2019-ncov/downloads/10Things.pdf.    How can I take care of myself?  1. Get lots of rest. Drink extra fluids (unless a doctor has told you not to).     2. Take Tylenol (acetaminophen) for fever or pain. If you have liver or kidney problems, ask your family doctor if it s okay to take Tylenol.     Adults can take either:     650 mg (two 325 mg pills) every 4 to 6 hours, or     1,000 mg (two 500 mg pills) every 8 hours as needed.     Note: Don t take more than 3,000 mg in one day.   Acetaminophen is found in many medicines (both prescribed and over-the-counter medicines). Read all labels to be sure you don t take too much.     For children, check the Tylenol bottle for the right dose. The dose is based on the child s age or weight.    3. If you have other health problems (like cancer, heart failure, an organ transplant or severe kidney disease): Call your specialty clinic if you don t feel better in the next 2 days.    4. Know when to call 911: Emergency warning signs include:    Trouble breathing or shortness of breath    Pain or pressure in the chest that doesn t go away    Feeling confused like you haven t felt before, or not being able to  wake up    Bluish-colored lips or face    What are the symptoms of COVID-19?     The most common symptoms are cough, fever and trouble breathing.     Less common symptoms include body aches, chills, diarrhea (loose, watery poops), fatigue (feeling very tired), headache, runny nose, sore throat and loss of smell.    COVID-19 can cause severe coughing (bronchitis) and lung infection (pneumonia).    How does it spread?     The virus may spread when a person coughs or sneezes into the air. The virus can travel about 6 feet this way, and it can live on surfaces.      Common  (household disinfectants) will kill the virus.    Who is at risk?  Anyone can catch COVID-19 if they re around someone who has the virus.    How can others protect themselves?     Stay away from people who have COVID-19 (or symptoms of COVID-19).    Wash hands often with soap and water. Or, use hand  with at least 60% alcohol.    Avoid touching the eyes, nose or mouth.     Wear a face mask when you go out in public, when sick or when caring for a sick person.    Where can I get more information?    [facility]: About COVID-19: [email or web address]    CDC: What to Do If You re Sick: www.cdc.gov/coronavirus/2019-ncov/about/steps-when-sick.html    CDC: Ending Home Isolation: www.cdc.gov/coronavirus/2019-ncov/hcp/disposition-in-home-patients.html     CDC: Caring for Someone: www.cdc.gov/coronavirus/2019-ncov/if-you-are-sick/care-for-someone.html    [facility]: Interim Guidance for Hospital Discharge to Home: www.health.[facility].[place]/diseases/coronavirus/hcp/hospdischarge.pdf    [facility] clinical trials (COVID-19 research studies): [email or web address]/[email or web address]     Below are the COVID-19 hotlines at the [place] [facility] ([facility]). Interpreters are available.   o For health questions: Call 952-754-3910 or 1-223.834.4089 (7 a.m. to 7 p.m.)  o For questions about schools and childcare: Call  346.546.6684 or 1-864.438.3255 (7 a.m. to 7 p.m.)            Thank you for taking steps to prevent the spread of this virus.  o Limit your contact with others.  o Wear a simple mask to cover your cough.  o Wash your hands well and often.    Resources    [facility]: About COVID-19: [email or web address]    CDC: What to Do If You're Sick: www.cdc.gov/coronavirus/2019-ncov/about/steps-when-sick.html    CDC: Ending Home Isolation: www.cdc.gov/coronavirus/2019-ncov/hcp/disposition-in-home-patients.html     CDC: Caring for Someone: www.cdc.gov/coronavirus/2019-ncov/if-you-are-sick/care-for-someone.html     [facility]: Interim Guidance for Hospital Discharge to Home: www.health.[facility].[place].us/diseases/coronavirus/hcp/hospdischarge.pdf    [facility] clinical trials (COVID-19 research studies): [email or web address]-clinical-trials     Below are the COVID-19 hotlines at the [place] Department [facility] ([facility]). Interpreters are available.   o For health questions: Call 980-318-5675 or 1-398.530.4759 (7 a.m. to 7 p.m.)  o For questions about schools and childcare: Call 242-359-0300 or 1-653.445.1373 (7 a.m. to 7 p.m.)

## 2021-06-11 NOTE — PROGRESS NOTES
ASSESSMENT/PLAN:  1. Trigger point of thoracic region  lidocaine 10 mg/mL (1 %) injection 10 mL   2. DM neuropathy, type II diabetes mellitus  gabapentin (NEURONTIN) 100 MG capsule   3. Acute bacterial conjunctivitis of right eye  polymyxin B-trimethoprim (FOR POLYTRIM) 10,000 unit- 1 mg/mL Drop ophthalmic drops   4. Bunion     5. Callus of foot     6. Chronic pain syndrome  oxyCODONE (ROXICODONE) 10 mg immediate release tablet   7. Paroxysmal atrial fibrillation  INR   8. COPD (chronic obstructive pulmonary disease)  budesonide-formoterol (SYMBICORT) 80-4.5 mcg/actuation inhaler       This is a 67-year-old female, seen today for trigger point injections.  She generally comes in once a month for injections, most often in the right posterior shoulder/scapular/neck/upper back region for trigger point pain.  This keeps us from increasing her pain medications.  She has done well with the trigger point injections in the past.  Today we accomplished 5 injections as noted below in the procedure note.    Of note, patient also complains today of right eye redness.  This is been red for a couple days, she woke up this morning with crusting of her eye.  It does not appear to have been purulent in nature, however she finds that it is itching, and we be.  Indeed she has signs of conjunctivitis on exam today.  We will treat with Polytrim ophthalmic drops.    Patient desires a prescription for diabetic shoes.  This was provided to the patient today.  She does have foot deformities related to bunions, callus, neuropathy.  A prescription was given to the patient.    Patient has underlying chronic pain syndrome, does use oxycodone therapy due to previous GI ulceration.  She has not shown any increasing need for pain medications, and has been reliable and filling these on a monthly basis when she comes in for her trigger point injections.    Patient does have a history of paroxysmal atrial fibrillation, currently rhythm appears sinus  by auscultation.  We will do an INR today, she is managed by our anticoagulation team.    Patient does have COPD, needs a refill on her Symbicort inhaler.  This refill was written today.        Medications Discontinued During This Encounter   Medication Reason     escitalopram oxalate (LEXAPRO) 10 MG tablet Therapy completed     gabapentin (NEURONTIN) 100 MG capsule Reorder     oxyCODONE (ROXICODONE) 10 mg immediate release tablet Reorder     There are no Patient Instructions on file for this visit.    Chief Complaint:  Chief Complaint   Patient presents with     Injections     trigger point injections       HPI:   Mary Kay Tejada is a 67 y.o. female c/o  Wants diabetic shoes  Has foot deformity with bunions bilaterally, edema bilaterally,   Callus bilateral medial foot - 1st MTP    Needs trigger point injections - pain worst in posterior right shoulder/neck/upper back      PMH:   Patient Active Problem List    Diagnosis Date Noted     Bunion 07/19/2017     Callus of foot 07/19/2017     Moderate aortic stenosis 05/23/2017     COPD (chronic obstructive pulmonary disease) 05/23/2017     DM neuropathy, type II diabetes mellitus 05/19/2017     Chronic obstructive pulmonary disease with acute lower respiratory infection 12/24/2016     Gastroenteritis 12/24/2016     Syncope 12/22/2016     Opacity of lung on imaging study 12/22/2016     Acute gastritis 12/22/2016     Osteoarthritis of both knees 08/22/2016     Osteoarthritis, hip, bilateral 06/20/2016     Primary osteoarthritis of left knee 06/20/2016     Candidal intertrigo 05/11/2016     Type 2 diabetes mellitus with hypoglycemia      Aspiration pneumonia 03/01/2016     Controlled substance agreement signed 11/23/2015     Cervical neck pain with evidence of disc disease 07/22/2015     Headache 07/17/2015     Hematoma of abdominal wall 07/05/2015     Skin lesion of face 05/22/2015     Tendonitis of wrist, right 04/22/2015     Menopause 04/06/2015     Flashers or  floaters of right eye 02/23/2015     Tendonitis of wrist, left 01/21/2015     Trigger point of thoracic region 01/21/2015     Generalized osteoarthrosis, involving multiple sites 01/14/2015     Vertigo 12/17/2014     Rotator cuff tendonitis 12/17/2014     Abnormal Weight Loss      Osteoarthritis Of The Shoulder      Chronic Constipation      Onychomycosis Of The Toenails      Diarrhea      Ganglion Of The Left Wrist      Larynx Edema      Obesity      Malignant Vulvar Neoplasm      Medial Epicondylitis      Skin Lesion      Urinary Incontinence      Chronic Major Depression      Dry Eye Syndrome      Nicotine Dependence      Hypokalemia      Essential hypertension      Muscle Cramps In The Calf      Edema      Atrial flutter      Lump In / On The Skin      Type 2 diabetes mellitus with hyperglycemia      Chronic pain syndrome      Paroxysmal Atrial Fibrillation      Fibromyalgia      Nausea      Abdominal Pain      Peptic Ulcer      COPD exacerbation      Mixed hyperlipidemia      Chronic Reflux Esophagitis      Benign Adenomatous Polyp Of The Large Intestine      Past Medical History:   Diagnosis Date     Aortic stenosis      Atrial fibrillation      Atrial flutter      Benign neoplasm of adenomatous polyp     large intestine      Chronic constipation      Chronic pain syndrome      COPD (chronic obstructive pulmonary disease)     Oxygen at night      Depression      Diabetes mellitus      Dry eye syndrome      Fibromyalgia      Ganglion     left wrist     GERD (gastroesophageal reflux disease)      Hyperlipidemia      Hypertension      Hypokalemia      Larynx edema      Lung disease      Medial epicondylitis      Onychomycosis      Osteoarthritis      Otitis Externa     Created by Conversion      Peptic ulcer      Type 2 diabetes mellitus      Vulvar malignant neoplasm      Past Surgical History:   Procedure Laterality Date     BREAST BIOPSY Right      BREAST BIOPSY Right 01/28/2015     BREAST BIOPSY Right  1/28/2015    Procedure: RIGHT BREAST BIOPSY AFTER WIRE LOCALIZATION AT 0940;  Surgeon: Renée Soriano MD;  Location: [facility];  Service:      HYSTERECTOMY       WY ABLATE HEART DYSRHYTHM FOCUS      Description: Catheter Ablation Atrial Fibrillation;  Recorded: 07/31/2012;  Comments: 7/24/12 PVI with Dr. Gardiner and nilay to all 5 pulm veins and CTI fl ablation line as well.     WY BIOPSY OF BREAST, INCISIONAL      Description: Incisional Breast Biopsy;  Recorded: 11/13/2007;  Comments: benign     WY SUPRACERV ABD HYSTERECTOMY      Description: Supracervical Hysterectomy;  Proc Date: 01/01/1985;  Comments: some cervix left!; ovaries intact; done for bleeding     WY TOTAL ABDOM HYSTERECTOMY      Description: Total Abdominal Hysterectomy;  Recorded: 12/31/2013;     WY TOTAL KNEE ARTHROPLASTY      Description: Total Knee Arthroplasty;  Recorded: 11/13/2007;     Social History     Social History     Marital status:      Spouse name: N/A     Number of children: N/A     Years of education: N/A     Occupational History     Not on file.     Social History Main Topics     Smoking status: Light Tobacco Smoker     Types: Cigarettes     Last attempt to quit: 1/1/2014     Smokeless tobacco: Never Used      Comment: E-cig some day     Alcohol use Yes      Comment: very little     Drug use: No     Sexual activity: Not on file     Other Topics Concern     Not on file     Social History Narrative       Meds:    Current Outpatient Prescriptions:      albuterol (PROVENTIL) 2.5 mg /3 mL (0.083 %) nebulizer solution, Take 3 mL (2.5 mg total) by nebulization every 4 (four) hours as needed for wheezing or shortness of breath., Disp: 75 mL, Rfl: 12     albuterol (VENTOLIN HFA) 90 mcg/actuation inhaler, INHALE 1 TO 2 PUFFS BY MOUTH EVERY 4 TO 6 HOURS AS NEEDED, Disp: 18 g, Rfl: 6     atorvastatin (LIPITOR) 20 MG tablet, Take 1 tablet (20 mg total) by mouth bedtime., Disp: 90 tablet, Rfl: 3     BD INSULIN PEN NEEDLE UF SHORT  "31 gauge x 5/16\" Ndle, TEST FOUR TIMES DAILY WITH MEALS AND AT BEDTIME, Disp: 300 each, Rfl: 2     blood glucose meter (GLUCOMETER), Use 1 each As Directed as needed. Dispense glucometer brand per patient's insurance at pharmacy discretion., Disp: 1 each, Rfl: 0     blood glucose test (ACCU-CHEK PEDRO PLUS TEST STRP) strips, Use 1 test strip to test 3 times daily. Dispense brand per patient's insurance at pharmacy discretion., Disp: 100 each, Rfl: 11     DAILY-FAYE tablet, TAKE 1 TABLET BY MOUTH EVERY DAY, Disp: 90 tablet, Rfl: 3     diltiazem (CARTIA XT) 120 MG 24 hr capsule, Take 1 capsule (120 mg total) by mouth daily., Disp: 30 capsule, Rfl: 6     estradiol (ESTRACE) 2 MG tablet, TAKE 1 TABLET BY MOUTH EVERY DAY, Disp: 90 tablet, Rfl: 0     furosemide (LASIX) 20 MG tablet, TAKE 1 TABLET(20 MG) BY MOUTH DAILY, Disp: 30 tablet, Rfl: 5     gabapentin (NEURONTIN) 100 MG capsule, Take 100-300 mg by mouth at bedtime. TAKE 1 CAPSULE BY MOUTH AT BEDTIME, Disp: 90 capsule, Rfl: 9     generic lancets, Use 1 each As Directed 3 (three) times a day. Accu-Chek Soft Touch, Disp: 100 each, Rfl: 11     insulin aspart (NOVOLOG FLEXPEN) 100 unit/mL injection pen, Sliding scale QID (meals/bedtime): -180, 2 units; -220, 4 u; -260, 6 u; -300, 8 u; -340, 10 u; BG >340, 12 u, Disp: 5 Pre-filled Pen Syringe, Rfl: 0     loperamide (IMODIUM) 2 mg capsule, Take 2 capsules by mouth initially followed by 1 capsule after each loose stool bowel movement. Do not exceed 8 capsules per day (Patient taking differently: Take 2-4 mg by mouth see administration instructions. Take 2 capsules by mouth initially followed by 1 capsule after each loose stool bowel movement. Do not exceed 8 capsules per day), Disp: 30 capsule, Rfl: 1     meclizine (ANTIVERT) 25 mg tablet, Take 1 tablet (25 mg total) by mouth 3 (three) times a day as needed for dizziness or nausea., Disp: 30 tablet, Rfl: 3     metFORMIN (GLUCOPHAGE) 500 MG " tablet, Take 1 tablet (500 mg total) by mouth 2 (two) times a day with meals., Disp: 60 tablet, Rfl: 6     MULTIVITAMIN WITH MINERALS (HAIR,SKIN AND NAILS ORAL), Take 3 tablets by mouth daily. Chewable formulation., Disp: , Rfl:      NEBULIZER/COMPRESSOR (NEBULIZER AND COMPRESSOR MISC), Use As Directed. Use UTD, Disp: , Rfl:      nicotine (NICODERM CQ) 21 mg/24 hr, APPLY ONE PATCH ON THE SKIN EVERY DAY, Disp: 28 patch, Rfl: 0     nystatin (NYSTOP) powder, Apply 1 application topically 2 (two) times a day as needed. 2-3 times to affected area(s)., Disp: 15 g, Rfl: 3     olopatadine (PATANOL) 0.1 % ophthalmic solution, Administer 1 drop to both eyes 2 (two) times a day as needed for allergies. , Disp: , Rfl:      omeprazole (PRILOSEC) 20 MG capsule, Take 1 capsule (20 mg total) by mouth 2 (two) times a day before meals., Disp: 60 capsule, Rfl: 6     oxyCODONE (ROXICODONE) 10 mg immediate release tablet, Take 1 tablet (10 mg total) by mouth every 6 (six) hours as needed., Disp: 120 tablet, Rfl: 0     ranitidine (ZANTAC) 150 MG tablet, TAKE 1 TABLET BY MOUTH TWICE DAILY, Disp: 60 tablet, Rfl: 6     UNABLE TO FIND, Take 1 capsule by mouth daily. Med Name: Probiotic with green tea., Disp: , Rfl:      warfarin (COUMADIN) 5 MG tablet, TAKE 1 TABLET(5 MG) BY MOUTH DAILY, Disp: 90 tablet, Rfl: 0     budesonide-formoterol (SYMBICORT) 160-4.5 mcg/actuation inhaler, Inhale 2 puffs 2 (two) times a day., Disp: 1 Inhaler, Rfl: 2     budesonide-formoterol (SYMBICORT) 80-4.5 mcg/actuation inhaler, Inhale 2 puffs 2 (two) times a day., Disp: 1 Inhaler, Rfl: 12     codeine-guaiFENesin (GUAIFENESIN AC)  mg/5 mL liquid, Take 5 mL by mouth 2 (two) times a day as needed for cough., Disp: 240 mL, Rfl: 0     ipratropium-albuterol (DUO-NEB) 0.5-2.5 mg/3 mL nebulizer, INHALE 1 VIAL IN NEBULIZER EVERY 4 HOURS AS NEEDED, Disp: 1440 mL, Rfl: 0     leg brace (ELASTIC KNEE SUPPORT) Misc, Use 1 each As Directed daily., Disp: , Rfl: 0      "polymyxin B-trimethoprim (FOR POLYTRIM) 10,000 unit- 1 mg/mL Drop ophthalmic drops, 1 drop in affected eye q 3 hours while awake, Disp: 10 mL, Rfl: 0     senna-docusate (PERICOLACE) 8.6-50 mg tablet, Take 2 tablets by mouth daily as needed for constipation., Disp: 30 tablet, Rfl: 1    Current Facility-Administered Medications:      lidocaine 10 mg/mL (1 %) injection 10 mL, 10 mL, Intradermal, Once, Cammy Bui MD    Allergies:  Allergies   Allergen Reactions     Celebrex [Celecoxib] Rash     patient had butterfly rash - \"lupus-like\"       Latex Rash       ROS:  Pertinent positives as noted in HPI; otherwise 12 point ROS negative.      Physical Exam:  EXAM:  /60  Pulse 84  Temp 98.1  F (36.7  C) (Oral)   Resp 14  Wt 200 lb 14.4 oz (91.1 kg)  BMI 33.95 kg/m2   Gen:  NAD, appears well, well-hydrated  HEENT:  TMs nl, oropharynx benign, nasal mucosa nl, conjunctiva :  R is injected/weepy, L is normal  Neck:  Supple, no adenopathy, no thyromegaly, no carotid bruits, no JVD  Lungs:  Clear to auscultation bilaterally  Cor:  RRR no murmur  Abd:  Soft, nontender, BS+, no masses, no guarding or rebound, no HSM  Extr:  DJD - knees with decrease ROM  + trigger points R>L at posterior shoulder, upper thoracic  Feet:  Bilateral bunion deformity; callus bilateral 1st MTP joints and edema; decreased sensation aaliyah forefoot  Neuro:  No asymmetry  Skin:  Warm/dry        Results:  Results for orders placed or performed in visit on 07/19/17   INR   Result Value Ref Range    INR 2.60 (H) 0.90 - 1.10         Procedure Note - Trigger Point Injections    Consent obtained: verbal    Indication:  Trigger point pain, fibromyalgia - right posterior shoulder/upper thoracic    5  trigger points identified.  Each trigger point swabbed x 3 with Betadine swab.  Each trigger point then injected with 2 ml of 1% Lidocaine (no epi).    Total volume injected:  10 ml    Complications:  None, patient tolerated procedure " well.    Plan:  Discussed care with patient.  RTC for further injections in 30 days prn.

## 2021-06-11 NOTE — PROGRESS NOTES
"Mary Kay Tejada is a 70 y.o. female who is being evaluated via a billable telephone visit.      The patient has been notified of following:     \"This telephone visit will be conducted via a call between you and your physician/provider. We have found that certain health care needs can be provided without the need for a physical exam.  This service lets us provide the care you need with a short phone conversation.  If a prescription is necessary we can send it directly to your pharmacy.  If lab work is needed we can place an order for that and you can then stop by our lab to have the test done at a later time.    Telephone visits are billed at different rates depending on your insurance coverage. During this emergency period, for some insurers they may be billed the same as an in-person visit.  Please reach out to your insurance provider with any questions.    If during the course of the call the physician/provider feels a telephone visit is not appropriate, you will not be charged for this service.\"    Patient has given verbal consent to a Telephone visit? Yes    What phone number would you like to be contacted at? [phone]    Patient would like to receive their AVS by AVS Preference: Maximilian.    Additional provider notes:   Pt stated: \"Sinuses are horrible from the change in the weather.\" \"Now going into the green stages.\"   Symptoms onset 24 hours ago. \"One nostril became plugged.\"  Pt uses Home O2 -> currently on 5L.   Denies any increased oxygen requirement, no cough.  No fevers.  No ear pain.  Denies frontal headache.  States \"just plugged sinuses.\"  No nausea or vomiting, no fever, no exposure to COVID-19.  Planning to follow-up with Dr. Brizuela in a couple of weeks for regular trigger point  shots and get her flu shot.  Assessment/Plan:  1. Acute sinusitis with coexisting condition, need prophylactic treatment    - amoxicillin (AMOXIL) 875 MG tablet; Take 1 tablet (875 mg total) by mouth 2 (two) times a " day for 10 days.  Dispense: 20 tablet; Refill: 0  - predniSONE (DELTASONE) 20 MG tablet; Take 20 mg by mouth 2 (two) times a day for 5 days.  Dispense: 10 tablet; Refill: 0  Continue with good hydration, extra vitamin C, nasal rinses as tolerated,, take the prescribed antibiotic as directed, possible side effect discussed.  Return if not improving in 1 to 2 weeks.  Sooner if symptoms get worse.        Phone call duration: 12 minutes    Kirby Chakraborty MD

## 2021-06-11 NOTE — TELEPHONE ENCOUNTER
ANTICOAGULATION  MANAGEMENT PROGRAM    Mary Kay Tejada is overdue for INR check.     Left message to check INR at upcoming office visit on 9/21 if she is unable to come in sooner      Marija Crawley RN

## 2021-06-11 NOTE — PROGRESS NOTES
"ASSESSMENT/PLAN:  1. Type 2 diabetes mellitus  metFORMIN (GLUCOPHAGE) 500 MG tablet   2. Chronic pain syndrome  oxyCODONE (ROXICODONE) 10 mg immediate release tablet   3. Chronic Obstructive Pulmonary Disease  albuterol (VENTOLIN HFA) 90 mcg/actuation inhaler    predniSONE (DELTASONE) 20 MG tablet   4. GERD (gastroesophageal reflux disease)  ranitidine (ZANTAC) 150 MG tablet   5. Chronic Reflux Esophagitis  omeprazole (PRILOSEC) 20 MG capsule   6. Trigger point of thoracic region  lidocaine 10 mg/mL (1 %) injection 10 mL   7. Paroxysmal atrial fibrillation  INR       This is a 66 yo female with underlying Type II DM, COPD, GERD, arthritis.  Has worsening joint issues - increasing knee pain.  Also having increasing \"junk\" in her lungs - worried about settling into a pneumonia again -.  I see no evidence of bacterial infections; will treat with short course of prednisone for COPD exacerbation.      Also concerned today about getting her refills on her antireflux measures.  I have refilled both her omeprazole and ranitidine today.    Patient has chronic pain syndrome, she benefits from trigger point injection therapy, and this was accomplished as well today.  Please refer to the procedure note below.  We will refill her oxycodone therapy as well today.    Patient has type 2 diabetes, needs a refill on medications today.  Declines laboratory testing today.  Has been under reasonable control.    Patient also has paroxysmal atrial fibrillation, needs an INR drawn today.  This was sent.  She will hear from the anticoagulation team.  Administrations This Visit     lidocaine 10 mg/mL (1 %) injection 10 mL     Admin Date Action Dose Route Administered By             06/19/2017 Given 10 mL Intradermal Emma Brown CMA                          Medications Discontinued During This Encounter   Medication Reason     ranitidine (ZANTAC) 150 MG tablet Duplicate order     ipratropium-albuterol (DUO-NEB) 0.5-2.5 mg/3 mL nebulizer " "Duplicate order     estradiol (ESTRACE) 2 MG tablet Duplicate order     oxyCODONE (ROXICODONE) 10 mg immediate release tablet Reorder     metFORMIN (GLUCOPHAGE) 500 MG tablet Reorder     VENTOLIN HFA 90 mcg/actuation inhaler Reorder     omeprazole (PRILOSEC) 20 MG capsule Reorder     ranitidine (ZANTAC) 150 MG tablet Reorder     warfarin (COUMADIN) 5 MG tablet Duplicate order     warfarin (COUMADIN) 5 MG tablet Duplicate order     MULTIVITAMIN (DAILY-FAYE ORAL) Therapy completed     insulin aspart (NOVOLOG FLEXPEN) 100 unit/mL injection pen Therapy completed     amoxicillin-clavulanate (AUGMENTIN) 250-62.5 mg/5 mL suspension Therapy completed     predniSONE (DELTASONE) 10 mg tablet      There are no Patient Instructions on file for this visit.    Chief Complaint:  Chief Complaint   Patient presents with     Trigger point injection     Right shoulder       HPI:   Mary Kay Tejada is a 67 y.o. female c/o  1.  Trigger point pain - here for injections  2.  Lungs are starting to be \"gunky\" again  No fever, no chills  No recent prednisone  3.  Pain meds  4.  Having trouble getting Ranitidine/Omeprazole rx  Was off for several days and \"stomach is killing me\"    PMH:   Patient Active Problem List    Diagnosis Date Noted     Moderate aortic stenosis 05/23/2017     COPD (chronic obstructive pulmonary disease) 05/23/2017     DM neuropathy, type II diabetes mellitus 05/19/2017     Chronic obstructive pulmonary disease with acute lower respiratory infection 12/24/2016     Gastroenteritis 12/24/2016     Syncope 12/22/2016     Opacity of lung on imaging study 12/22/2016     Acute gastritis 12/22/2016     Osteoarthritis of both knees 08/22/2016     Osteoarthritis, hip, bilateral 06/20/2016     Primary osteoarthritis of left knee 06/20/2016     Candidal intertrigo 05/11/2016     Type 2 diabetes mellitus with hypoglycemia      Aspiration pneumonia 03/01/2016     Controlled substance agreement signed 11/23/2015     Cervical neck " pain with evidence of disc disease 07/22/2015     Headache 07/17/2015     Hematoma of abdominal wall 07/05/2015     Skin lesion of face 05/22/2015     Tendonitis of wrist, right 04/22/2015     Menopause 04/06/2015     Flashers or floaters of right eye 02/23/2015     Tendonitis of wrist, left 01/21/2015     Trigger point of thoracic region 01/21/2015     Generalized osteoarthrosis, involving multiple sites 01/14/2015     Vertigo 12/17/2014     Rotator cuff tendonitis 12/17/2014     Abnormal Weight Loss      Osteoarthritis Of The Shoulder      Chronic Constipation      Onychomycosis Of The Toenails      Diarrhea      Ganglion Of The Left Wrist      Larynx Edema      Obesity      Malignant Vulvar Neoplasm      Medial Epicondylitis      Skin Lesion      Urinary Incontinence      Chronic Major Depression      Dry Eye Syndrome      Nicotine Dependence      Hypokalemia      Essential hypertension      Muscle Cramps In The Calf      Edema      Atrial flutter      Lump In / On The Skin      Type 2 diabetes mellitus with hyperglycemia      Chronic pain syndrome      Paroxysmal Atrial Fibrillation      Fibromyalgia      Nausea      Abdominal Pain      Peptic Ulcer      COPD exacerbation      Mixed hyperlipidemia      Chronic Reflux Esophagitis      Benign Adenomatous Polyp Of The Large Intestine      Past Medical History:   Diagnosis Date     Aortic stenosis      Atrial fibrillation      Atrial flutter      Benign neoplasm of adenomatous polyp     large intestine      Chronic constipation      Chronic pain syndrome      COPD (chronic obstructive pulmonary disease)     Oxygen at night      Depression      Diabetes mellitus      Dry eye syndrome      Fibromyalgia      Ganglion     left wrist     GERD (gastroesophageal reflux disease)      Hyperlipidemia      Hypertension      Hypokalemia      Larynx edema      Lung disease      Medial epicondylitis      Onychomycosis      Osteoarthritis      Otitis Externa     Created by  Conversion      Peptic ulcer      Type 2 diabetes mellitus      Vulvar malignant neoplasm      Past Surgical History:   Procedure Laterality Date     BREAST BIOPSY Right      BREAST BIOPSY Right 01/28/2015     BREAST BIOPSY Right 1/28/2015    Procedure: RIGHT BREAST BIOPSY AFTER WIRE LOCALIZATION AT 0940;  Surgeon: Renée Soriano MD;  Location: [facility] [place];  Service:      HYSTERECTOMY       AZ ABLATE HEART DYSRHYTHM FOCUS      Description: Catheter Ablation Atrial Fibrillation;  Recorded: 07/31/2012;  Comments: 7/24/12 PVI with Dr. Gardiner and nilay to all 5 pulm veins and CTI fl ablation line as well.     AZ BIOPSY OF BREAST, INCISIONAL      Description: Incisional Breast Biopsy;  Recorded: 11/13/2007;  Comments: benign     AZ SUPRACERV ABD HYSTERECTOMY      Description: Supracervical Hysterectomy;  Proc Date: 01/01/1985;  Comments: some cervix left!; ovaries intact; done for bleeding     AZ TOTAL ABDOM HYSTERECTOMY      Description: Total Abdominal Hysterectomy;  Recorded: 12/31/2013;     AZ TOTAL KNEE ARTHROPLASTY      Description: Total Knee Arthroplasty;  Recorded: 11/13/2007;     Social History     Social History     Marital status:      Spouse name: N/A     Number of children: N/A     Years of education: N/A     Occupational History     Not on file.     Social History Main Topics     Smoking status: Light Tobacco Smoker     Types: Cigarettes     Last attempt to quit: 1/1/2014     Smokeless tobacco: Never Used      Comment: E-cig some day     Alcohol use Yes      Comment: very little     Drug use: No     Sexual activity: Not on file     Other Topics Concern     Not on file     Social History Narrative       Meds:    Current Outpatient Prescriptions:      albuterol (PROVENTIL) 2.5 mg /3 mL (0.083 %) nebulizer solution, Take 3 mL (2.5 mg total) by nebulization every 4 (four) hours as needed for wheezing or shortness of breath., Disp: 75 mL, Rfl: 12     albuterol (VENTOLIN HFA) 90 mcg/actuation  "inhaler, INHALE 1 TO 2 PUFFS BY MOUTH EVERY 4 TO 6 HOURS AS NEEDED, Disp: 18 g, Rfl: 6     atorvastatin (LIPITOR) 20 MG tablet, Take 1 tablet (20 mg total) by mouth bedtime., Disp: 90 tablet, Rfl: 3     BD INSULIN PEN NEEDLE UF SHORT 31 gauge x 5/16\" Ndle, TEST FOUR TIMES DAILY WITH MEALS AND AT BEDTIME, Disp: 300 each, Rfl: 2     blood glucose meter (GLUCOMETER), Use 1 each As Directed as needed. Dispense glucometer brand per patient's insurance at pharmacy discretion., Disp: 1 each, Rfl: 0     blood glucose test (ACCU-CHEK PEDRO PLUS TEST STRP) strips, Use 1 test strip to test 3 times daily. Dispense brand per patient's insurance at pharmacy discretion., Disp: 100 each, Rfl: 11     budesonide-formoterol (SYMBICORT) 160-4.5 mcg/actuation inhaler, Inhale 2 puffs 2 (two) times a day., Disp: 1 Inhaler, Rfl: 2     DAILY-FAYE tablet, TAKE 1 TABLET BY MOUTH EVERY DAY, Disp: 90 tablet, Rfl: 3     diltiazem (CARTIA XT) 120 MG 24 hr capsule, Take 1 capsule (120 mg total) by mouth daily., Disp: 30 capsule, Rfl: 6     escitalopram oxalate (LEXAPRO) 10 MG tablet, Take 10 mg by mouth daily., Disp: , Rfl:      estradiol (ESTRACE) 2 MG tablet, TAKE 1 TABLET BY MOUTH EVERY DAY, Disp: 90 tablet, Rfl: 0     furosemide (LASIX) 20 MG tablet, TAKE 1 TABLET(20 MG) BY MOUTH DAILY, Disp: 30 tablet, Rfl: 5     gabapentin (NEURONTIN) 100 MG capsule, Take 100 mg by mouth at bedtime., Disp: 30 capsule, Rfl: 0     generic lancets, Use 1 each As Directed 3 (three) times a day. Accu-Chek Soft Touch, Disp: 100 each, Rfl: 11     insulin aspart (NOVOLOG FLEXPEN) 100 unit/mL injection pen, Sliding scale QID (meals/bedtime): -180, 2 units; -220, 4 u; -260, 6 u; -300, 8 u; -340, 10 u; BG >340, 12 u, Disp: 5 Pre-filled Pen Syringe, Rfl: 0     ipratropium-albuterol (DUO-NEB) 0.5-2.5 mg/3 mL nebulizer, INHALE 1 VIAL IN NEBULIZER EVERY 4 HOURS AS NEEDED, Disp: 1440 mL, Rfl: 0     leg brace (ELASTIC KNEE SUPPORT) Misc, Use 1 " each As Directed daily., Disp: , Rfl: 0     loperamide (IMODIUM) 2 mg capsule, Take 2 capsules by mouth initially followed by 1 capsule after each loose stool bowel movement. Do not exceed 8 capsules per day (Patient taking differently: Take 2-4 mg by mouth see administration instructions. Take 2 capsules by mouth initially followed by 1 capsule after each loose stool bowel movement. Do not exceed 8 capsules per day), Disp: 30 capsule, Rfl: 1     meclizine (ANTIVERT) 25 mg tablet, Take 1 tablet (25 mg total) by mouth 3 (three) times a day as needed for dizziness or nausea., Disp: 30 tablet, Rfl: 3     metFORMIN (GLUCOPHAGE) 500 MG tablet, Take 1 tablet (500 mg total) by mouth 2 (two) times a day with meals., Disp: 60 tablet, Rfl: 6     MULTIVITAMIN WITH MINERALS (HAIR,SKIN AND NAILS ORAL), Take 3 tablets by mouth daily. Chewable formulation., Disp: , Rfl:      NEBULIZER/COMPRESSOR (NEBULIZER AND COMPRESSOR MISC), Use As Directed. Use UTD, Disp: , Rfl:      nicotine (NICODERM CQ) 21 mg/24 hr, APPLY ONE PATCH ON THE SKIN EVERY DAY, Disp: 28 patch, Rfl: 0     nystatin (NYSTOP) powder, Apply 1 application topically 2 (two) times a day as needed. 2-3 times to affected area(s)., Disp: 15 g, Rfl: 3     olopatadine (PATANOL) 0.1 % ophthalmic solution, Administer 1 drop to both eyes 2 (two) times a day as needed for allergies. , Disp: , Rfl:      omeprazole (PRILOSEC) 20 MG capsule, Take 1 capsule (20 mg total) by mouth 2 (two) times a day before meals., Disp: 60 capsule, Rfl: 6     oxyCODONE (ROXICODONE) 10 mg immediate release tablet, Take 1 tablet (10 mg total) by mouth every 6 (six) hours as needed., Disp: 120 tablet, Rfl: 0     ranitidine (ZANTAC) 150 MG tablet, TAKE 1 TABLET BY MOUTH TWICE DAILY, Disp: 60 tablet, Rfl: 6     senna-docusate (PERICOLACE) 8.6-50 mg tablet, Take 2 tablets by mouth daily as needed for constipation., Disp: 30 tablet, Rfl: 1     UNABLE TO FIND, Take 1 capsule by mouth daily. Med Name:  "Probiotic with green tea., Disp: , Rfl:      warfarin (COUMADIN) 5 MG tablet, TAKE 1 TABLET(5 MG) BY MOUTH DAILY, Disp: 90 tablet, Rfl: 0     codeine-guaiFENesin (GUAIFENESIN AC)  mg/5 mL liquid, Take 5 mL by mouth 2 (two) times a day as needed for cough., Disp: 240 mL, Rfl: 0     predniSONE (DELTASONE) 20 MG tablet, Take 2 tablets (40 mg total) by mouth daily for 5 days., Disp: 10 tablet, Rfl: 0  No current facility-administered medications for this visit.     Allergies:  Allergies   Allergen Reactions     Celebrex [Celecoxib] Rash     patient had butterfly rash - \"lupus-like\"       Latex Rash       ROS:  Pertinent positives as noted in HPI; otherwise 12 point ROS negative.      Physical Exam:  EXAM:  /54 (Patient Site: Left Arm, Patient Position: Sitting, Cuff Size: Adult Regular)  Pulse 80  Temp 97.9  F (36.6  C) (Oral)   Resp 20  Wt 202 lb (91.6 kg)  BMI 34.14 kg/m2   Gen:  NAD, appears well, well-hydrated  HEENT:  TMs nl, oropharynx benign, nasal mucosa nl, conjunctiva clear  Neck:  Supple, no adenopathy, no thyromegaly, no carotid bruits, no JVD  Lungs:  Clear to auscultation bilaterally  Cor:  RRR no murmur  Abd:  Soft, nontender, BS+, no masses, no guarding or rebound, no HSM  Extr:  Bilateral knee DJD, right suprascapular muscle spasms/trigger points  Neuro:  No asymmetry  Skin:  Warm/dry        Results:  Results for orders placed or performed in visit on 06/19/17   INR   Result Value Ref Range    INR 2.10 (H) 0.90 - 1.10           Procedure Note - Trigger Point Injections    Consent obtained: verbal    Indication:  Fibromyalgia/trigger points    5 trigger points identified.  Each trigger point swabbed x 3 with Betadine swab.  Each trigger point then injected with 2 ml of 1% Lidocaine (no epi).    Total volume injected:  10 ml    Complications:  None, patient tolerated procedure well.    Plan:  Discussed care with patient.  RTC for further injections in 30 days prn.      "

## 2021-06-11 NOTE — TELEPHONE ENCOUNTER
ANTICOAGULATION  MANAGEMENT    Assessment     Today's INR result of 1.9 is Subtherapeutic (goal INR of 2.0-3.0)        Warfarin taken as previously instructed    No new diet changes affecting INR    Potential interaction between Prednisone and Omeprazole and warfarin which may affect subsequent INRs    Continues to tolerate warfarin with no reported s/s of bleeding or thromboembolism     Previous INR was Subtherapeutic    Plan:     Spoke on phone with Mary Kay regarding INR result and instructed:     Warfarin Dosing Instructions:  Continue current warfarin dose 2.5 mg daily on Sundays and Thursdays; and 5 mg daily rest of week  (0 % change)    Instructed patient to follow up no later than: 9/25    Education provided: importance of consistent vitamin K intake, impact of vitamin K foods on INR, importance of therapeutic range, importance of following up for INR monitoring at instructed interval, importance of taking warfarin as instructed and potential interaction between warfarin and Prednisone and omeprazole    Mary Kay verbalizes understanding and agrees to warfarin dosing plan.    Instructed to call the [facility] for any changes, questions or concerns. (#[phone])   ?   Mena Dumont RN    Subjective/Objective:      Mary Kay Tejada, a 70 y.o. female is on warfarin.     Mary Kay reports:     Home warfarin dose: verbally confirmed home dose with Mary Kay and updated on anticoagulation calendar     Missed doses: No     Medication changes:  Yes: Prednisone and Omprazole     S/S of bleeding or thromboembolism:  No     New Injury or illness:  Yes: sinus issues     Changes in diet or alcohol consumption:  No     Upcoming surgery, procedure or cardioversion:  No    Anticoagulation Episode Summary     Current INR goal:   2.0-3.0   TTR:   34.1 % (1 y)   Next INR check:   9/25/2020   INR from last check:   1.90! (9/21/2020)   Weekly max warfarin dose:      Target end date:   Indefinite   INR check  location:      Preferred lab:      Send INR reminders to:   [facility]    Indications    Paroxysmal Atrial Fibrillation [I48.91]           Comments:            Anticoagulation Care Providers     Provider Role Specialty Phone number    Cammy Bui MD Referring Family Medicine [phone]

## 2021-06-11 NOTE — PROGRESS NOTES
ASSESSMENT/PLAN:  1. Nicotine Dependence  nicotine (NICODERM CQ) 21 mg/24 hr   2. Chronic a-fib (H)  INR   3. Chronic pain syndrome  oxyCODONE (ROXICODONE) 10 mg immediate release tablet   4. Peptic ulcer  omeprazole (PRILOSEC) 20 MG capsule   5. Trigger point of thoracic region  lidocaine 10 mg/mL (1 %) injection 10 mL   6. Fibromyalgia  lidocaine 10 mg/mL (1 %) injection 10 mL   7. Acute sinusitis treated with antibiotics in the past 60 days  predniSONE (DELTASONE) 20 MG tablet       This is a 71 yo female here with:  1.  Nicotine Dependence - desires cessation - would like to try a nicotine patch - discussed, ordered  2.  Chronic A Fib - on anticoagulation therapy - check INR  3.  Chronic pain syndrome - discussed use of pain medications -   4.  Peptic ulcer - has had recurrent symptoms over last several years - doesn't tolerate NSAIDs  5.  Trigger point of thoracic region/Fibromyalgia - here for trigger point injections - see procedure note  6.  Acute Sinusitis - still has symptoms - despite recent antibiotic treatment - will add Prednisone - discussed    Return in about 1 month (around 10/21/2020) for Recheck.  Administrations This Visit     lidocaine 10 mg/mL (1 %) injection 10 mL     Admin Date  09/21/2020 Action  Given by Other Dose  10 mL Route  Other Administered By  Amanda Alexandra MA                Medications Discontinued During This Encounter   Medication Reason     oxyCODONE (ROXICODONE) 10 mg immediate release tablet Reorder     There are no Patient Instructions on file for this visit.    Chief Complaint:  Chief Complaint   Patient presents with     Follow-up     INR, trigger point shots     medication check       HPI:   Mary Kay Tejada is a 70 y.o. female c/o  1.  Trigger points - left is bad as well as right  2.  Wants to quit smoking -    Has tried patches in past - would like to use these again      PMH:   Patient Active Problem List    Diagnosis Date Noted     Advanced directives,  counseling/discussion 08/17/2020     Primary osteoarthritis of both knees 01/17/2020     Mixed stress and urge urinary incontinence 08/16/2019     Skin lesion 07/17/2019     Chronic a-fib (H)      Dyslipidemia      Upper GI bleed 12/13/2018     Melena 12/13/2018     Anemia due to blood loss, acute 07/18/2018     Diabetic polyneuropathy associated with type 2 diabetes mellitus (H) 07/18/2018     Chronic anemia 06/27/2018     Cataract 11/06/2017     Bunion, left 07/19/2017     Callus of foot 07/19/2017     Nonrheumatic aortic valve stenosis 05/23/2017     COPD (chronic obstructive pulmonary disease) (H) 05/23/2017     Chronic obstructive pulmonary disease with acute lower respiratory infection (H) 12/24/2016     Gastroenteritis 12/24/2016     Syncope 12/22/2016     Opacity of lung on imaging study 12/22/2016     Acute gastritis 12/22/2016     Osteoarthritis of both knees 08/22/2016     Osteoarthritis, hip, bilateral 06/20/2016     Primary osteoarthritis of left knee 06/20/2016     Candidal intertrigo 05/11/2016     Type 2 diabetes mellitus with hypoglycemia (H)      Aspiration pneumonia (H) 03/01/2016     Controlled substance agreement signed 11/23/2015     Cervical neck pain with evidence of disc disease 07/22/2015     Headache 07/17/2015     Hematoma of abdominal wall 07/05/2015     Skin lesion of face 05/22/2015     Tendonitis of wrist, right 04/22/2015     Menopause 04/06/2015     Flashers or floaters of right eye 02/23/2015     Tendonitis of wrist, left 01/21/2015     Trigger point of thoracic region 01/21/2015     Generalized osteoarthrosis, involving multiple sites 01/14/2015     Vertigo 12/17/2014     Rotator cuff tendonitis 12/17/2014     Abnormal Weight Loss      Osteoarthritis Of The Shoulder      Chronic Constipation      Onychomycosis Of The Toenails      Diarrhea      Ganglion Of The Left Wrist      Larynx Edema      Obesity      Medial Epicondylitis      Skin Lesion      Urinary Incontinence       Chronic Major Depression      Dry Eye Syndrome      Nicotine Dependence      Hypokalemia      Essential hypertension      Muscle Cramps In The Calf      Edema      Atrial flutter (H)      Lump In / On The Skin      Chronic pain syndrome      Paroxysmal Atrial Fibrillation      Fibromyalgia      Nausea      Abdominal Pain      Peptic Ulcer      COPD exacerbation (H)      Mixed hyperlipidemia      Chronic Reflux Esophagitis      Benign Adenomatous Polyp Of The Large Intestine      Past Medical History:   Diagnosis Date     Anemia      Aortic stenosis      Atrial fibrillation (H)      Atrial flutter (H)      Benign neoplasm of adenomatous polyp     large intestine      Chronic constipation      Chronic pain syndrome      COPD (chronic obstructive pulmonary disease) (H)     Oxygen at night      Dependence on supplemental oxygen     Oxygen at noc, during the day as needed     Depression      Diabetes mellitus (H)      Dry eye syndrome      Fibromyalgia      Ganglion     left wrist     GERD (gastroesophageal reflux disease)      Hyperlipidemia      Hypertension      Hypokalemia      Larynx edema      Lung disease      Malignant Vulvar Neoplasm     Created by Conversion [facility] Annotation: Apr 17 2007  8:24AM - Cammy Bui:  resection per Dr. Alfonso Mane 9/06;  Replacement Utility updated for latest IMO load     Medial epicondylitis      Onychomycosis      Osteoarthritis      Otitis Externa     Created by Conversion      Peptic ulcer      Polyneuropathy      Vulvar malignant neoplasm (H)      Past Surgical History:   Procedure Laterality Date     BREAST BIOPSY Right      BREAST BIOPSY Right 01/28/2015     BREAST BIOPSY Right 1/28/2015    Procedure: RIGHT BREAST BIOPSY AFTER WIRE LOCALIZATION AT 0940;  Surgeon: Renée Soriano MD;  [place] [facility] [place]:      COLONOSCOPY N/A 6/14/2019    Procedure: COLONOSCOPY;  Surgeon: Eduardo Mora MD;  [place] [facility] [place]  Service:  Gastroenterology     ESOPHAGOGASTRODUODENOSCOPY N/A 11/6/2018    Procedure: ESOPHAGOGASTRODUODENOSCOPY;  Surgeon: Lit Fernando MD;  [place] [facility] [place]  Service:      HYSTERECTOMY       JOINT REPLACEMENT Left     TKA     RI ABLATE HEART DYSRHYTHM FOCUS      Description: Catheter Ablation Atrial Fibrillation;  Recorded: 07/31/2012;  Comments: 7/24/12 PVI with Dr. Gardiner and nilay to all 5 pulm veins and CTI fl ablation line as well.     RI BIOPSY OF BREAST, INCISIONAL      Description: Incisional Breast Biopsy;  Recorded: 11/13/2007;  Comments: benign     RI SUPRACERV ABD HYSTERECTOMY      Description: Supracervical Hysterectomy;  Proc Date: 01/01/1985;  Comments: some cervix left!; ovaries intact; done for bleeding     Social History     Socioeconomic History     Marital status:      Spouse name: Not on file     Number of children: Not on file     Years of education: Not on file     Highest education level: Not on file   Occupational History     Not on file   Social Needs     Financial resource strain: Not on file     Food insecurity     Worry: Not on file     Inability: Not on file     Transportation needs     Medical: Not on file     Non-medical: Not on file   Tobacco Use     Smoking status: Current Every Day Smoker     Packs/day: 0.50     Types: Cigarettes     Smokeless tobacco: Never Used   Substance and Sexual Activity     Alcohol use: Yes     Comment: very little     Drug use: No     Sexual activity: Not on file   Lifestyle     Physical activity     Days per week: Not on file     Minutes per session: Not on file     Stress: Not on file   Relationships     Social connections     Talks on phone: Not on file     Gets together: Not on file     Attends Restoration service: Not on file     Active member of club or organization: Not on file     Attends meetings of clubs or organizations: Not on file     Relationship status: Not on file     Intimate partner violence     Fear of current or ex  "partner: Not on file     Emotionally abused: Not on file     Physically abused: Not on file     Forced sexual activity: Not on file   Other Topics Concern     Not on file   Social History Narrative     Not on file     Family History   Problem Relation Age of Onset     Heart failure Mother      Cancer Other         paternal HX-laryngeal      Alcoholism Sister      No Medical Problems Daughter      No Medical Problems Maternal Grandmother      No Medical Problems Maternal Grandfather      No Medical Problems Paternal Grandmother      No Medical Problems Paternal Grandfather      No Medical Problems Maternal Aunt      No Medical Problems Paternal Aunt      Alcoholism Sister      Alcoholism Brother      Alcohol abuse Father      Cancer Paternal Uncle         Gastric-Alcohol     Cancer Paternal Uncle         gastric-Alcohol     BRCA 1/2 Neg Hx      Breast cancer Neg Hx      Colon cancer Neg Hx      Endometrial cancer Neg Hx      Ovarian cancer Neg Hx        Meds:    Current Outpatient Medications:      ACCU-CHEK SOFTCLIX LANCETS lancets, TEST THREE TIMES DAILY, Disp: 300 each, Rfl: 3     albuterol (PROAIR HFA;PROVENTIL HFA;VENTOLIN HFA) 90 mcg/actuation inhaler, INHALE 2 PUFFS EVERY 4 HOURS AS NEEDED WHEEZING, Disp: 90 g, Rfl: 11     albuterol (PROVENTIL) 2.5 mg /3 mL (0.083 %) nebulizer solution, Take 3 mL (2.5 mg total) by nebulization every 4 (four) hours as needed for wheezing or shortness of breath., Disp: 75 mL, Rfl: 12     atorvastatin (LIPITOR) 20 MG tablet, TAKE 1 TABLET(20 MG) BY MOUTH AT BEDTIME, Disp: 90 tablet, Rfl: 3     BD ULTRA-FINE SHORT PEN NEEDLE 31 gauge x 5/16\" Ndle, TEST FOUR TIMES DAILY WITH MEALS AND AT BEDTIME, Disp: 400 each, Rfl: 3     blood glucose test strips, Use 1 each As Directed 3 (three) times a day as needed (for blood glucose testing). Dispense strips that are for One Touch Verio IQ meter, Disp: 300 strip, Rfl: 3     blood-glucose meter (ONETOUCH VERIO IQ METER) Misc, Check blood sugar " three times a day., Disp: 1 each, Rfl: 0     budesonide-formoteroL (SYMBICORT) 160-4.5 mcg/actuation inhaler, Inhale 2 puffs 2 (two) times a day. Inhale 2 puff by mouth twice daily, Disp: 1 Inhaler, Rfl: 2     cyclobenzaprine (FLEXERIL) 10 MG tablet, Take 1 tablet (10 mg total) by mouth at bedtime as needed for muscle spasms., Disp: 30 tablet, Rfl: 0     diaper,brief,adult,disposable (ADULT BRIEFS - LARGE) Misc, Use 3-4 daily as needed for incontinence, Disp: 120 each, Rfl: 6     diltiazem (CARDIZEM CD) 120 MG 24 hr capsule, TAKE ONE CAPSULE BY MOUTH DAILY, Disp: 90 capsule, Rfl: 1     famotidine (PEPCID) 20 MG tablet, Take 1 tablet (20 mg total) by mouth 2 (two) times a day., Disp: 180 tablet, Rfl: 3     furosemide (LASIX) 20 MG tablet, TAKE 1 TABLET(20 MG) BY MOUTH DAILY AS NEEDED, Disp: 90 tablet, Rfl: 3     gabapentin (NEURONTIN) 600 MG tablet, TAKE 1 TABLET(600 MG) BY MOUTH THREE TIMES DAILY, Disp: 270 tablet, Rfl: 3     generic lancets (FINGERSTIX LANCETS), Dispense brand per patient's insurance at pharmacy discretion., Disp: 300 each, Rfl: 0     gentamicin (GARAMYCIN) 0.1 % ointment, APPLY TOPICALLY TO WOUND BID, Disp: , Rfl: 0     insulin aspart U-100 (NOVOLOG) 100 unit/mL injection, Inject under the skin 3 (three) times a day before meals. Inject per sliding scale, Disp: , Rfl:      ipratropium-albuterol (DUO-NEB) 0.5-2.5 mg/3 mL nebulizer, INAHALE 1 VIAL IN NEBULIZER EVERY 4 HOURS AS NEEDED, Disp: 1440 mL, Rfl: 0     meclizine (ANTIVERT) 25 mg tablet, TAKE 1 TABLET(25 MG) BY MOUTH THREE TIMES DAILY AS NEEDED FOR DIZZINESS OR NAUSEA, Disp: 45 tablet, Rfl: 5     metFORMIN (GLUCOPHAGE) 500 MG tablet, Take 1 tablet (500 mg total) by mouth 2 (two) times a day with meals., Disp: 180 tablet, Rfl: 3     mometasone-formoterol (DULERA) 200-5 mcg/actuation HFAA inhaler, Inhale 2 puffs 2 (two) times a day., Disp: 1 Inhaler, Rfl: 5     nystatin (NYSTOP) powder, Apply 1 application topically 2 (two) times a day as  "needed. 2-3 times to affected area(s)., Disp: 60 g, Rfl: 3     oxyCODONE (ROXICODONE) 10 mg immediate release tablet, Take 1 tablet (10 mg total) by mouth every 6 (six) hours as needed., Disp: 120 tablet, Rfl: 0     polyethylene glycol (MIRALAX) 17 gram packet, Take 1 packet (17 g total) by mouth daily. (Patient taking differently: Take 17 g by mouth daily as needed.    ), Disp: , Rfl: 0     sucralfate (CARAFATE) 1 gram tablet, TAKE 1 TABLET BY MOUTH FOUR TIMES DAILY(CRUSH TABLET AND MIX IT WITH A LITTLE WATER, THEN SWALLOW), Disp: 120 tablet, Rfl: 12     warfarin ANTICOAGULANT (COUMADIN/JANTOVEN) 5 MG tablet, Take 1/2-1 tablet (2.5-5 mg) daily as directed. Adjust dose per INR results., Disp: 90 tablet, Rfl: 1     nicotine (NICODERM CQ) 21 mg/24 hr, Place 1 patch on the skin daily., Disp: 30 patch, Rfl: 1     omeprazole (PRILOSEC) 20 MG capsule, Take 1 capsule (20 mg total) by mouth daily before breakfast., Disp: 30 capsule, Rfl: 3    Allergies:  Allergies   Allergen Reactions     Celebrex [Celecoxib] Rash     patient had butterfly rash - \"lupus-like\"       Latex Rash       ROS:  Pertinent positives as noted in HPI; otherwise 12 point ROS negative.      Physical Exam:  EXAM:  /68 (Patient Site: Right Arm, Patient Position: Sitting, Cuff Size: Adult Regular)   Pulse 81   Temp 99  F (37.2  C) (Tympanic)   Resp 18   Wt 154 lb (69.9 kg)   BMI 26.85 kg/m     Gen:  NAD, appears well, well-hydrated  HEENT:  TMs nl, oropharynx benign, nasal mucosa nl, conjunctiva clear  Neck:  Supple, no adenopathy, no thyromegaly, no carotid bruits, no JVD  Lungs:  Clear to auscultation bilaterally  Cor:  RRR no murmur  Abd:  Soft, nontender, BS+, no masses, no guarding or rebound, no HSM  Back:  Multiple trigger points in upper thoracic/lower cervical region  Extr:  Neg.  Neuro:  No asymmetry  Skin:  Warm/dry        Results:  Results for orders placed or performed in visit on 09/21/20   INR   Result Value Ref Range    INR 1.90 " (H) 0.90 - 1.10       Procedure Note - Trigger Point Injections    Consent obtained: verbal    Indication:  Fibromyalgia, trigger points    6  trigger points identified.  Each trigger point swabbed x 3 with Betadine swab.  Each trigger point then injected with 1.5-2  ml of 1% Lidocaine (no epi).    Total volume injected:  10 ml    Complications:  None, patient tolerated procedure well.    Plan:  Discussed care with patient.  RTC for further injections in 30 days prn.

## 2021-06-12 NOTE — PROGRESS NOTES
[facility] Care Coordination Contact  [facility] Home Visit Assessment     Health Risk Assessment with Mary Kay Tejada completed on 10/15/2020 via telephone due to COVID-19.    Type of residence:: Private home - stairs  Current living arrangement:: I live in a private home with family     Assessment completed with: Patient, Care Team Member    Current Care Plan  Member currently receiving the following home care services:     Member currently receiving the following community resources: [facility], [employer], Housekeeping/Chore Agency, PCA      Medication Review  Medication reconciliation completed in Epic: IF NO, PLEASE EXPLAIN Did not get to to as mbr had to go talk to the .  Medication set-up & administration: Independent-does not set up  Self-administers medications  Medication Risk Assessment Medication (1 or more, place referral to MTM):  Not assessed  MTM Referral Placed: No: n/a    Mental/Behavioral Health   Depression Screening:    PHQ-2 Total Score: 0       Mental health DX:: No        Falls Assessment:   Fallen 2 or more times in the past year?: No   Any fall with injury in the past year?: No    ADL/IADL Dependencies:   Dependent ADLs:: Dressing, Grooming  Dependent IADLs:: Cleaning, Laundry, Shopping, Transportation    [facility] Plan sponsored benefits: Shared information re: Silver Sneakers/gym memberships, ASA, Calcium +D.    PCA Assessment completed at visit: Yes Annual PCA assessment indicated 4 units per day of PCA. This is the same as the previous assessment.     Elderly Waiver Eligibility: Yes - will continue on EW    Care Plan & Recommendations: Member will continue to reside at home with son and his significant other. Member continues to need assisatnce with dressing, grooming and IADLs. Will continue PCA and homemaking services. She's currently receiving comfort bath wipes at 3 packs/month and we will increase to 4/month. She would like to remain physically active and  healthy but difficult to keep up due to COVID-19 and the weather. Will order a pedlar floor excerciser. Member is due for a vision exam. She will call for an appt.        See [facility] for detailed assessment information.    Follow-Up Plan: Member informed of future contact, plan to f/u with member with a 6 month telephone assessment.  Contact information shared with member, encouraged member to call with any questions or concerns at any time.    Samantha Alexandra RN, PHN   [facility]  [phone]

## 2021-06-12 NOTE — PROGRESS NOTES
ASSESSMENT/PLAN:  1. Fibromyalgia  lidocaine 1%-EPINEPHrine 1:100,000 1 %-1:100,000 injection 10 mL (XYLOCAINE W/EPI)   2. Chronic pain syndrome  oxyCODONE (ROXICODONE) 10 mg immediate release tablet   3. Chronic gastric ulcer without hemorrhage and without perforation         This is a 71 yo female with:  1.  Fibromyalgia/chronic pain - here for monthly trigger point injections - this has allowed us to not increase her dosing of Oxycodone (which she takes for the chronic pain).    See procedure note.  Oxycodone refill sent.    2.  Chronic gastric ulcer - most recently bleeding in last 1-2 years.  Has chronic pain in gut - no evidence of bleeding.  Need to avoid NSAIDs.      Return in about 1 month (around 11/19/2020) for Recheck.      Medications Discontinued During This Encounter   Medication Reason     oxyCODONE (ROXICODONE) 10 mg immediate release tablet Reorder     Patient Instructions   Make sure you are taking your Famotidine (for your stomach)        Chief Complaint:  Chief Complaint   Patient presents with     tigger point shot     Medication Refill       HPI:   Mary Kay Tejada is a 70 y.o. female c/o  Drinking Ensure - or Equate (cheaper)    Taking Omeprazole daily; taking Carafate four times a day; just refilled Famotidine    PMH:   Patient Active Problem List    Diagnosis Date Noted     Chronic gastric ulcer without hemorrhage and without perforation 10/19/2020     Advanced directives, counseling/discussion 08/17/2020     Primary osteoarthritis of both knees 01/17/2020     Mixed stress and urge urinary incontinence 08/16/2019     Skin lesion 07/17/2019     Chronic a-fib (H)      Dyslipidemia      Upper GI bleed 12/13/2018     Melena 12/13/2018     Anemia due to blood loss, acute 07/18/2018     Diabetic polyneuropathy associated with type 2 diabetes mellitus (H) 07/18/2018     Chronic anemia 06/27/2018     Cataract 11/06/2017     Bunion, left 07/19/2017     Callus of foot 07/19/2017     Nonrheumatic  aortic valve stenosis 05/23/2017     COPD (chronic obstructive pulmonary disease) (H) 05/23/2017     Chronic obstructive pulmonary disease with acute lower respiratory infection (H) 12/24/2016     Gastroenteritis 12/24/2016     Syncope 12/22/2016     Opacity of lung on imaging study 12/22/2016     Acute gastritis 12/22/2016     Osteoarthritis of both knees 08/22/2016     Osteoarthritis, hip, bilateral 06/20/2016     Primary osteoarthritis of left knee 06/20/2016     Candidal intertrigo 05/11/2016     Type 2 diabetes mellitus with hypoglycemia (H)      Aspiration pneumonia (H) 03/01/2016     Controlled substance agreement signed 11/23/2015     Cervical neck pain with evidence of disc disease 07/22/2015     Headache 07/17/2015     Hematoma of abdominal wall 07/05/2015     Skin lesion of face 05/22/2015     Tendonitis of wrist, right 04/22/2015     Menopause 04/06/2015     Flashers or floaters of right eye 02/23/2015     Tendonitis of wrist, left 01/21/2015     Trigger point of thoracic region 01/21/2015     Generalized osteoarthrosis, involving multiple sites 01/14/2015     Vertigo 12/17/2014     Rotator cuff tendonitis 12/17/2014     Abnormal Weight Loss      Osteoarthritis Of The Shoulder      Chronic Constipation      Onychomycosis Of The Toenails      Diarrhea      Ganglion Of The Left Wrist      Larynx Edema      Obesity      Medial Epicondylitis      Skin Lesion      Urinary Incontinence      Chronic Major Depression      Dry Eye Syndrome      Nicotine Dependence      Hypokalemia      Essential hypertension      Muscle Cramps In The Calf      Edema      Atrial flutter (H)      Lump In / On The Skin      Chronic pain syndrome      Paroxysmal Atrial Fibrillation      Fibromyalgia      Nausea      Abdominal Pain      Peptic Ulcer      COPD exacerbation (H)      Mixed hyperlipidemia      Chronic Reflux Esophagitis      Benign Adenomatous Polyp Of The Large Intestine      Past Medical History:   Diagnosis Date      Anemia      Aortic stenosis      Atrial fibrillation (H)      Atrial flutter (H)      Benign neoplasm of adenomatous polyp     large intestine      Chronic constipation      Chronic pain syndrome      COPD (chronic obstructive pulmonary disease) (H)     Oxygen at night      Dependence on supplemental oxygen     Oxygen at noc, during the day as needed     Depression      Diabetes mellitus (H)      Dry eye syndrome      Fibromyalgia      Ganglion     left wrist     GERD (gastroesophageal reflux disease)      Hyperlipidemia      Hypertension      Hypokalemia      Larynx edema      Lung disease      Malignant Vulvar Neoplasm     Created by Conversion [facility] Annotation: Apr 17 2007  8:24AM - Cammy Bui:  resection per Dr. Alfonso Mane 9/06;  Replacement Utility updated for latest IMO load     Medial epicondylitis      Onychomycosis      Osteoarthritis      Otitis Externa     Created by Conversion      Peptic ulcer      Polyneuropathy      Vulvar malignant neoplasm (H)      Past Surgical History:   Procedure Laterality Date     BREAST BIOPSY Right      BREAST BIOPSY Right 01/28/2015     BREAST BIOPSY Right 1/28/2015    Procedure: RIGHT BREAST BIOPSY AFTER WIRE LOCALIZATION AT 0940;  Surgeon: Renée Soriano MD;  Location: [facility] [place];  Service:      COLONOSCOPY N/A 6/14/2019    Procedure: COLONOSCOPY;  Surgeon: Eduardo Mora MD;  Location: [facility] [place];  Service: Gastroenterology     ESOPHAGOGASTRODUODENOSCOPY N/A 11/6/2018    Procedure: ESOPHAGOGASTRODUODENOSCOPY;  Surgeon: Lit Fernando MD;  Location: [facility] [place];  Service:      HYSTERECTOMY       JOINT REPLACEMENT Left     TKA     MA ABLATE HEART DYSRHYTHM FOCUS      Description: Catheter Ablation Atrial Fibrillation;  Recorded: 07/31/2012;  Comments: 7/24/12 PVI with Dr. Gardiner and nilay to all 5 pulm veins and CTI fl ablation line as well.     MA BIOPSY OF BREAST, INCISIONAL      Description: Incisional Breast Biopsy;   Recorded: 11/13/2007;  Comments: benign     MT SUPRACERV ABD HYSTERECTOMY      Description: Supracervical Hysterectomy;  Proc Date: 01/01/1985;  Comments: some cervix left!; ovaries intact; done for bleeding     Social History     Socioeconomic History     Marital status:      Spouse name: Not on file     Number of children: Not on file     Years of education: Not on file     Highest education level: Not on file   Occupational History     Not on file   Social Needs     Financial resource strain: Not on file     Food insecurity     Worry: Not on file     Inability: Not on file     Transportation needs     Medical: Not on file     Non-medical: Not on file   Tobacco Use     Smoking status: Current Every Day Smoker     Packs/day: 0.50     Types: Cigarettes     Smokeless tobacco: Never Used   Substance and Sexual Activity     Alcohol use: Yes     Comment: very little     Drug use: No     Sexual activity: Not on file   Lifestyle     Physical activity     Days per week: Not on file     Minutes per session: Not on file     Stress: Not on file   Relationships     Social connections     Talks on phone: Not on file     Gets together: Not on file     Attends Confucianism service: Not on file     Active member of club or organization: Not on file     Attends meetings of clubs or organizations: Not on file     Relationship status: Not on file     Intimate partner violence     Fear of current or ex partner: Not on file     Emotionally abused: Not on file     Physically abused: Not on file     Forced sexual activity: Not on file   Other Topics Concern     Not on file   Social History Narrative     Not on file     Family History   Problem Relation Age of Onset     Heart failure Mother      Cancer Other         paternal HX-laryngeal      Alcoholism Sister      No Medical Problems Daughter      No Medical Problems Maternal Grandmother      No Medical Problems Maternal Grandfather      No Medical Problems Paternal Grandmother       "No Medical Problems Paternal Grandfather      No Medical Problems Maternal Aunt      No Medical Problems Paternal Aunt      Alcoholism Sister      Alcoholism Brother      Alcohol abuse Father      Cancer Paternal Uncle         Gastric-Alcohol     Cancer Paternal Uncle         gastric-Alcohol     BRCA 1/2 Neg Hx      Breast cancer Neg Hx      Colon cancer Neg Hx      Endometrial cancer Neg Hx      Ovarian cancer Neg Hx        Meds:    Current Outpatient Medications:      ACCU-CHEK SOFTCLIX LANCETS lancets, TEST THREE TIMES DAILY, Disp: 300 each, Rfl: 3     albuterol (PROAIR HFA;PROVENTIL HFA;VENTOLIN HFA) 90 mcg/actuation inhaler, INHALE 2 PUFFS EVERY 4 HOURS AS NEEDED WHEEZING, Disp: 90 g, Rfl: 11     albuterol (PROVENTIL) 2.5 mg /3 mL (0.083 %) nebulizer solution, Take 3 mL (2.5 mg total) by nebulization every 4 (four) hours as needed for wheezing or shortness of breath., Disp: 75 mL, Rfl: 12     atorvastatin (LIPITOR) 20 MG tablet, TAKE 1 TABLET(20 MG) BY MOUTH AT BEDTIME, Disp: 90 tablet, Rfl: 3     BD ULTRA-FINE SHORT PEN NEEDLE 31 gauge x 5/16\" Ndle, TEST FOUR TIMES DAILY WITH MEALS AND AT BEDTIME, Disp: 400 each, Rfl: 3     blood glucose test strips, Use 1 each As Directed 3 (three) times a day as needed (for blood glucose testing). Dispense strips that are for One Touch Verio IQ meter, Disp: 300 strip, Rfl: 3     blood-glucose meter (ONETOUCH VERIO IQ METER) Misc, Check blood sugar three times a day., Disp: 1 each, Rfl: 0     budesonide-formoteroL (SYMBICORT) 160-4.5 mcg/actuation inhaler, Inhale 2 puffs 2 (two) times a day. Inhale 2 puff by mouth twice daily, Disp: 1 Inhaler, Rfl: 2     cyclobenzaprine (FLEXERIL) 10 MG tablet, Take 1 tablet (10 mg total) by mouth at bedtime as needed for muscle spasms., Disp: 30 tablet, Rfl: 0     diaper,brief,adult,disposable (ADULT BRIEFS - LARGE) Misc, Use 3-4 daily as needed for incontinence, Disp: 120 each, Rfl: 6     diltiazem (CARDIZEM CD) 120 MG 24 hr capsule, TAKE " ONE CAPSULE BY MOUTH DAILY, Disp: 90 capsule, Rfl: 1     famotidine (PEPCID) 20 MG tablet, Take 1 tablet (20 mg total) by mouth 2 (two) times a day., Disp: 180 tablet, Rfl: 3     furosemide (LASIX) 20 MG tablet, TAKE 1 TABLET(20 MG) BY MOUTH DAILY AS NEEDED, Disp: 90 tablet, Rfl: 3     gabapentin (NEURONTIN) 600 MG tablet, TAKE 1 TABLET(600 MG) BY MOUTH THREE TIMES DAILY, Disp: 270 tablet, Rfl: 3     generic lancets (FINGERSTIX LANCETS), Dispense brand per patient's insurance at pharmacy discretion., Disp: 300 each, Rfl: 0     gentamicin (GARAMYCIN) 0.1 % ointment, APPLY TOPICALLY TO WOUND BID, Disp: , Rfl: 0     insulin aspart U-100 (NOVOLOG) 100 unit/mL injection, Inject under the skin 3 (three) times a day before meals. Inject per sliding scale, Disp: , Rfl:      ipratropium-albuterol (DUO-NEB) 0.5-2.5 mg/3 mL nebulizer, INAHALE 1 VIAL IN NEBULIZER EVERY 4 HOURS AS NEEDED, Disp: 1440 mL, Rfl: 0     meclizine (ANTIVERT) 25 mg tablet, TAKE 1 TABLET(25 MG) BY MOUTH THREE TIMES DAILY AS NEEDED FOR DIZZINESS OR NAUSEA, Disp: 45 tablet, Rfl: 5     metFORMIN (GLUCOPHAGE) 500 MG tablet, TAKE 1 TABLET(500 MG) BY MOUTH TWICE DAILY WITH MEALS, Disp: 180 tablet, Rfl: 3     mometasone-formoterol (DULERA) 200-5 mcg/actuation HFAA inhaler, Inhale 2 puffs 2 (two) times a day., Disp: 1 Inhaler, Rfl: 5     nicotine (NICODERM CQ) 21 mg/24 hr, Place 1 patch on the skin daily., Disp: 30 patch, Rfl: 1     nystatin (NYSTOP) powder, Apply 1 application topically 2 (two) times a day as needed. 2-3 times to affected area(s)., Disp: 60 g, Rfl: 3     omeprazole (PRILOSEC) 20 MG capsule, Take 1 capsule (20 mg total) by mouth daily before breakfast., Disp: 30 capsule, Rfl: 3     oxyCODONE (ROXICODONE) 10 mg immediate release tablet, Take 1 tablet (10 mg total) by mouth every 6 (six) hours as needed., Disp: 120 tablet, Rfl: 0     polyethylene glycol (MIRALAX) 17 gram packet, Take 1 packet (17 g total) by mouth daily. (Patient taking  "differently: Take 17 g by mouth daily as needed.    ), Disp: , Rfl: 0     warfarin ANTICOAGULANT (COUMADIN/JANTOVEN) 5 MG tablet, Take 1/2-1 tablet (2.5-5 mg) daily as directed. Adjust dose per INR results., Disp: 90 tablet, Rfl: 1     sucralfate (CARAFATE) 1 gram tablet, TAKE 1 TABLET BY MOUTH FOUR TIMES DAILY(CRUSH TABLET AND MIX IT WITH A LITTLE WATER, THEN SWALLOW), Disp: 120 tablet, Rfl: 12    Current Facility-Administered Medications:      lidocaine 1%-EPINEPHrine 1:100,000 1 %-1:100,000 injection 10 mL (XYLOCAINE W/EPI), 10 mL, Other, Once, Cammy Bui MD    Allergies:  Allergies   Allergen Reactions     Celebrex [Celecoxib] Rash     patient had butterfly rash - \"lupus-like\"       Latex Rash       ROS:  Pertinent positives as noted in HPI; otherwise 12 point ROS negative.      Physical Exam:  EXAM:  /73 (Patient Site: Left Arm, Patient Position: Sitting, Cuff Size: Adult Regular)   Pulse 84   Resp 16   Wt 156 lb (70.8 kg)   SpO2 99%   BMI 27.20 kg/m     Gen:  NAD, appears chronically ill, well-hydrated  HEENT:  TMs nl, oropharynx benign, nasal mucosa nl, conjunctiva clear  Neck:  Supple, no adenopathy, no thyromegaly, no carotid bruits, no JVD  Lungs:  Clear to auscultation bilaterally  Cor:  RRR no murmur  Abd:  Soft, nontender, BS+, no masses, no guarding or rebound, no HSM  Back:  Multiple trigger points along bilateral upper thoracic region  Extr:  Neg.  Neuro:  No asymmetry  Skin:  Warm/dry        Results:  Results for orders placed or performed in visit on 09/21/20   INR   Result Value Ref Range    INR 1.90 (H) 0.90 - 1.10         Procedure Note - Trigger Point Injections    Consent obtained: verbal    Indication:  Fibromyalgia/Trigger Points    6 trigger points identified.  Each trigger point swabbed x 3 with Betadine swab.  Each trigger point then injected with 1.6. ml of 1% Lidocaine (no epi).    Total volume injected:  10 ml    Complications:  None, patient tolerated " procedure well.    Plan:  Discussed care with patient.  RTC for further injections in 30 days prn.

## 2021-06-12 NOTE — PROGRESS NOTES
ASSESSMENT/PLAN:  1. Trigger point of thoracic region     2. Chronic pain syndrome  oxyCODONE (ROXICODONE) 10 mg immediate release tablet   3. Visit for screening mammogram  Mammo Screening Bilateral       This is a 68 yo female here for :  1.  Trigger point injections - has severe pain in trigger points especially right posterior neck into suprascapular region.  See procedure note below.  Monthly injections have served to decrease further needs for opiates.  2.  Chronic pain - renew oxycodone - no indication for diversion or overuse of medications.  3.  Due for screening mammogram - will refer.        Medications Discontinued During This Encounter   Medication Reason     budesonide-formoterol (SYMBICORT) 80-4.5 mcg/actuation inhaler Duplicate order     There are no Patient Instructions on file for this visit.    Chief Complaint:  Chief Complaint   Patient presents with     Injections     Trigger point injections        HPI:   Mary Kay Tejada is a 67 y.o. female c/o  Weaning off the antidepressant therapy  Also can't get Estrace - will wean down  Is up to 3 pills at night for feet -     PMH:   Patient Active Problem List    Diagnosis Date Noted     Bunion 07/19/2017     Callus of foot 07/19/2017     Moderate aortic stenosis 05/23/2017     COPD (chronic obstructive pulmonary disease) 05/23/2017     DM neuropathy, type II diabetes mellitus 05/19/2017     Chronic obstructive pulmonary disease with acute lower respiratory infection 12/24/2016     Gastroenteritis 12/24/2016     Syncope 12/22/2016     Opacity of lung on imaging study 12/22/2016     Acute gastritis 12/22/2016     Osteoarthritis of both knees 08/22/2016     Osteoarthritis, hip, bilateral 06/20/2016     Primary osteoarthritis of left knee 06/20/2016     Candidal intertrigo 05/11/2016     Type 2 diabetes mellitus with hypoglycemia      Aspiration pneumonia 03/01/2016     Controlled substance agreement signed 11/23/2015     Cervical neck pain with evidence  of disc disease 07/22/2015     Headache 07/17/2015     Hematoma of abdominal wall 07/05/2015     Skin lesion of face 05/22/2015     Tendonitis of wrist, right 04/22/2015     Menopause 04/06/2015     Flashers or floaters of right eye 02/23/2015     Tendonitis of wrist, left 01/21/2015     Trigger point of thoracic region 01/21/2015     Generalized osteoarthrosis, involving multiple sites 01/14/2015     Vertigo 12/17/2014     Rotator cuff tendonitis 12/17/2014     Abnormal Weight Loss      Osteoarthritis Of The Shoulder      Chronic Constipation      Onychomycosis Of The Toenails      Diarrhea      Ganglion Of The Left Wrist      Larynx Edema      Obesity      Malignant Vulvar Neoplasm      Medial Epicondylitis      Skin Lesion      Urinary Incontinence      Chronic Major Depression      Dry Eye Syndrome      Nicotine Dependence      Hypokalemia      Essential hypertension      Muscle Cramps In The Calf      Edema      Atrial flutter      Lump In / On The Skin      Type 2 diabetes mellitus with hyperglycemia      Chronic pain syndrome      Paroxysmal Atrial Fibrillation      Fibromyalgia      Nausea      Abdominal Pain      Peptic Ulcer      COPD exacerbation      Mixed hyperlipidemia      Chronic Reflux Esophagitis      Benign Adenomatous Polyp Of The Large Intestine      Past Medical History:   Diagnosis Date     Aortic stenosis      Atrial fibrillation      Atrial flutter      Benign neoplasm of adenomatous polyp     large intestine      Chronic constipation      Chronic pain syndrome      COPD (chronic obstructive pulmonary disease)     Oxygen at night      Depression      Diabetes mellitus      Dry eye syndrome      Fibromyalgia      Ganglion     left wrist     GERD (gastroesophageal reflux disease)      Hyperlipidemia      Hypertension      Hypokalemia      Larynx edema      Lung disease      Medial epicondylitis      Onychomycosis      Osteoarthritis      Otitis Externa     Created by Conversion      Peptic  ulcer      Type 2 diabetes mellitus      Vulvar malignant neoplasm      Past Surgical History:   Procedure Laterality Date     BREAST BIOPSY Right      BREAST BIOPSY Right 01/28/2015     BREAST BIOPSY Right 1/28/2015    Procedure: RIGHT BREAST BIOPSY AFTER WIRE LOCALIZATION AT 0940;  Surgeon: Renée Soriano MD;  Location: [facility];  Service:      HYSTERECTOMY       MD ABLATE HEART DYSRHYTHM FOCUS      Description: Catheter Ablation Atrial Fibrillation;  Recorded: 07/31/2012;  Comments: 7/24/12 PVI with Dr. Gardiner and nilay to all 5 pulm veins and CTI fl ablation line as well.     MD BIOPSY OF BREAST, INCISIONAL      Description: Incisional Breast Biopsy;  Recorded: 11/13/2007;  Comments: benign     MD SUPRACERV ABD HYSTERECTOMY      Description: Supracervical Hysterectomy;  Proc Date: 01/01/1985;  Comments: some cervix left!; ovaries intact; done for bleeding     MD TOTAL ABDOM HYSTERECTOMY      Description: Total Abdominal Hysterectomy;  Recorded: 12/31/2013;     MD TOTAL KNEE ARTHROPLASTY      Description: Total Knee Arthroplasty;  Recorded: 11/13/2007;     Social History     Social History     Marital status:      Spouse name: N/A     Number of children: N/A     Years of education: N/A     Occupational History     Not on file.     Social History Main Topics     Smoking status: Light Tobacco Smoker     Types: Cigarettes     Last attempt to quit: 1/1/2014     Smokeless tobacco: Never Used      Comment: E-cig some day     Alcohol use Yes      Comment: very little     Drug use: No     Sexual activity: Not on file     Other Topics Concern     Not on file     Social History Narrative       Meds:    Current Outpatient Prescriptions:      albuterol (PROVENTIL) 2.5 mg /3 mL (0.083 %) nebulizer solution, Take 3 mL (2.5 mg total) by nebulization every 4 (four) hours as needed for wheezing or shortness of breath., Disp: 75 mL, Rfl: 12     albuterol (VENTOLIN HFA) 90 mcg/actuation inhaler, INHALE 1 TO 2  "PUFFS BY MOUTH EVERY 4 TO 6 HOURS AS NEEDED, Disp: 18 g, Rfl: 6     atorvastatin (LIPITOR) 20 MG tablet, Take 1 tablet (20 mg total) by mouth bedtime., Disp: 90 tablet, Rfl: 3     BD INSULIN PEN NEEDLE UF SHORT 31 gauge x 5/16\" Ndle, TEST FOUR TIMES DAILY WITH MEALS AND AT BEDTIME, Disp: 300 each, Rfl: 2     blood glucose meter (GLUCOMETER), Use 1 each As Directed as needed. Dispense glucometer brand per patient's insurance at pharmacy discretion., Disp: 1 each, Rfl: 0     blood glucose test (ACCU-CHEK PEDRO PLUS TEST STRP) strips, Use 1 test strip to test 3 times daily. Dispense brand per patient's insurance at pharmacy discretion., Disp: 100 each, Rfl: 11     budesonide-formoterol (SYMBICORT) 160-4.5 mcg/actuation inhaler, Inhale 2 puffs 2 (two) times a day., Disp: 1 Inhaler, Rfl: 2     codeine-guaiFENesin (GUAIFENESIN AC)  mg/5 mL liquid, Take 5 mL by mouth 2 (two) times a day as needed for cough., Disp: 240 mL, Rfl: 0     DAILY-FAYE tablet, TAKE 1 TABLET BY MOUTH EVERY DAY, Disp: 90 tablet, Rfl: 3     diltiazem (CARTIA XT) 120 MG 24 hr capsule, Take 1 capsule (120 mg total) by mouth daily., Disp: 30 capsule, Rfl: 6     estradiol (ESTRACE) 2 MG tablet, TAKE 1 TABLET BY MOUTH EVERY DAY, Disp: 90 tablet, Rfl: 0     furosemide (LASIX) 20 MG tablet, TAKE 1 TABLET(20 MG) BY MOUTH DAILY, Disp: 30 tablet, Rfl: 5     gabapentin (NEURONTIN) 100 MG capsule, Take 100-300 mg by mouth at bedtime. TAKE 1 CAPSULE BY MOUTH AT BEDTIME, Disp: 90 capsule, Rfl: 9     generic lancets, Use 1 each As Directed 3 (three) times a day. Accu-Chek Soft Touch, Disp: 100 each, Rfl: 11     insulin aspart (NOVOLOG FLEXPEN) 100 unit/mL injection pen, Sliding scale QID (meals/bedtime): -180, 2 units; -220, 4 u; -260, 6 u; -300, 8 u; -340, 10 u; BG >340, 12 u, Disp: 5 Pre-filled Pen Syringe, Rfl: 0     ipratropium-albuterol (DUO-NEB) 0.5-2.5 mg/3 mL nebulizer, INHALE 1 VIAL IN NEBULIZER EVERY 4 HOURS AS NEEDED, " Disp: 1440 mL, Rfl: 0     leg brace (ELASTIC KNEE SUPPORT) Misc, Use 1 each As Directed daily., Disp: , Rfl: 0     loperamide (IMODIUM) 2 mg capsule, Take 2 capsules by mouth initially followed by 1 capsule after each loose stool bowel movement. Do not exceed 8 capsules per day (Patient taking differently: Take 2-4 mg by mouth see administration instructions. Take 2 capsules by mouth initially followed by 1 capsule after each loose stool bowel movement. Do not exceed 8 capsules per day), Disp: 30 capsule, Rfl: 1     meclizine (ANTIVERT) 25 mg tablet, Take 1 tablet (25 mg total) by mouth 3 (three) times a day as needed for dizziness or nausea., Disp: 30 tablet, Rfl: 3     metFORMIN (GLUCOPHAGE) 500 MG tablet, Take 1 tablet (500 mg total) by mouth 2 (two) times a day with meals., Disp: 60 tablet, Rfl: 6     MULTIVITAMIN WITH MINERALS (HAIR,SKIN AND NAILS ORAL), Take 3 tablets by mouth daily. Chewable formulation., Disp: , Rfl:      NEBULIZER/COMPRESSOR (NEBULIZER AND COMPRESSOR MISC), Use As Directed. Use UTD, Disp: , Rfl:      nicotine (NICODERM CQ) 21 mg/24 hr, APPLY ONE PATCH ON THE SKIN EVERY DAY, Disp: 28 patch, Rfl: 0     nystatin (NYSTOP) powder, Apply 1 application topically 2 (two) times a day as needed. 2-3 times to affected area(s)., Disp: 15 g, Rfl: 3     olopatadine (PATANOL) 0.1 % ophthalmic solution, Administer 1 drop to both eyes 2 (two) times a day as needed for allergies. , Disp: , Rfl:      omeprazole (PRILOSEC) 20 MG capsule, Take 1 capsule (20 mg total) by mouth 2 (two) times a day before meals., Disp: 60 capsule, Rfl: 6     oxyCODONE (ROXICODONE) 10 mg immediate release tablet, Take 1 tablet (10 mg total) by mouth every 6 (six) hours as needed., Disp: 120 tablet, Rfl: 0     polymyxin B-trimethoprim (FOR POLYTRIM) 10,000 unit- 1 mg/mL Drop ophthalmic drops, 1 drop in affected eye q 3 hours while awake, Disp: 10 mL, Rfl: 0     predniSONE (DELTASONE) 10 mg tablet, 6 tabs po daily x 2 d, 5 tabs po  "daily x 2 d, 4 tabs po daily x 2 d,  3 tabs po daily x 2 d,  2 tabs po daily x 2 d,  1 tab po daily x 2 d, Disp: 42 tablet, Rfl: 0     ranitidine (ZANTAC) 150 MG tablet, TAKE 1 TABLET BY MOUTH TWICE DAILY, Disp: 60 tablet, Rfl: 6     senna-docusate (PERICOLACE) 8.6-50 mg tablet, Take 2 tablets by mouth daily as needed for constipation., Disp: 30 tablet, Rfl: 1     UNABLE TO FIND, Take 1 capsule by mouth daily. Med Name: Probiotic with green tea., Disp: , Rfl:      warfarin (COUMADIN) 5 MG tablet, TAKE 1 TABLET(5 MG) BY MOUTH DAILY, Disp: 90 tablet, Rfl: 0    Current Facility-Administered Medications:      lidocaine 10 mg/mL (1 %) injection 10 mL, 10 mL, Intradermal, Once, Cammy Bui MD    Allergies:  Allergies   Allergen Reactions     Celebrex [Celecoxib] Rash     patient had butterfly rash - \"lupus-like\"       Latex Rash       ROS:  Pertinent positives as noted in HPI; otherwise 12 point ROS negative.      Physical Exam:  EXAM:  /64 (Patient Site: Right Arm, Patient Position: Sitting, Cuff Size: Adult Large)  Pulse 86  Temp 98.5  F (36.9  C) (Oral)   Resp 24  Ht 5' 4.5\" (1.638 m)  Wt 201 lb (91.2 kg)  SpO2 94% Comment: at rest with room air  BMI 33.97 kg/m2   Gen:  NAD, appears well, well-hydrated  HEENT:  TMs nl, oropharynx benign, nasal mucosa nl, conjunctiva clear  Neck:  Supple, no adenopathy, no thyromegaly, no carotid bruits, no JVD  Lungs:  Clear to auscultation bilaterally  Cor:  RRR, + systolic murmur (heard best at LLSB)  Abd:  Soft, nontender, BS+, no masses, no guarding or rebound, no HSM  Extr:  Neg.  Neuro:  No asymmetry  Skin:  Warm/dry        Results:  Results for orders placed or performed in visit on 07/19/17   INR   Result Value Ref Range    INR 2.60 (H) 0.90 - 1.10         Procedure Note - Trigger Point Injections    Consent obtained: verbal    Indication:  Fibromyalgia, trigger point pain    5 trigger points identified.  Each trigger point swabbed x 3 with Betadine " swab.  Each trigger point then injected with 2 ml of 1% Lidocaine (no epi).    Total volume injected:  10 ml    Complications:  None, patient tolerated procedure well.    Plan:  Discussed care with patient.  RTC for further injections in 30 days prn.

## 2021-06-12 NOTE — PROGRESS NOTES
"Anticoagulation Annual Referral Renewal Review    Mary Kay Tejada's chart reviewed for annual renewal of referral to anticoagulation monitoring.        Criteria for anticoagulation nurse and/or pharmacist renewal met   Warfarin indication: Atrial Fibrillation Yes, per indication   Current with INR monitoring/compliant No appt scheduled on Mon 10/19   Date of last office visit 9/21/20 Yes, had office visit within last year   Time in Therapeutic Range (TTR) 34 % No, TTR < 60 %       Mary Kay Tejada did NOT meet all criteria for anticoagulation management program initiated renewal and requires provider review. Using dot phrase, \".acmrenewalprovider\", please advise if [place] anticoagulation management referral should be renewed or if patient should be seen in office to review anticoagulation therapy      Marija Crawley RN  3:10 PM        "

## 2021-06-12 NOTE — TELEPHONE ENCOUNTER
Who is calling:  Patient is calling back this morning.    Reason for Call:  She has not received a call back yet and would like a call as soon as possible this morning.    Date of last appointment with primary care: 10/19/20  Okay to leave a detailed message: Yes.  Please leave a message on her voice mail if she does not answer.

## 2021-06-12 NOTE — TELEPHONE ENCOUNTER
Relayed your message to her and she have an upcoming appt on 11/23/2020 at 9:20 AM.   Per patient says she is not taking any type of Ibuprofen or Aleve type of medications but does sometimes takes Pepto Bismol.    Your schedule is booked today, ok to double book or wait until 11/23?

## 2021-06-12 NOTE — TELEPHONE ENCOUNTER
We use Carafate (Sucralfate) to treat ulcers.  It would be extremely unusual for this to cause bleeding.  Make sure she is not taking any ibuprofen or Aleve type medications.  Is she taking Pepto Bismol?  This can cause dark stools.  She should be seen - maybe she could see me tomorrow?  If she is dizzy/lightheaded or passing more blood, should go to the hospital.

## 2021-06-12 NOTE — TELEPHONE ENCOUNTER
ANTICOAGULATION  MANAGEMENT PROGRAM    Mary Kay Tjeada is overdue for INR check.     Left message to call and schedule INR appointment as soon as possible.      Mena Dumont RN

## 2021-06-12 NOTE — TELEPHONE ENCOUNTER
ANTICOAGULATION  MANAGEMENT PROGRAM    Mary Kay Tejada is overdue for INR check.     Spoke with Mary Kay and patient will have INR checked at next office visit on 10/19.      Marija Crawley RN

## 2021-06-12 NOTE — PROGRESS NOTES
[facility] Care Coordination Contact    Received after visit chart from care coordinator.  Completed following tasks: Mailed copy of care plan to client, Updated services in access and Submitted referrals/auths for homemaking     , [employer]:  Faxed completed PCA assessment to [facility] and mailed copies to member.  Faxed MD Communication to PCP.  Emailed referral form for auth to [employer].    Provider Signature - No POC Shared:  Member indicates that they do not want their POC shared with any [facility] providers.     and Order placed with [facility] (p: [phone]; f: [phone]) for floor peddler exerciser and inrease in wipes.  Order placed on 10/26/20. Access updated.  As required, authorization submitted to health plan.    Mailed POC signature page and  PCA signature page to member to sign and return asap.    Mailed Health care directive form and info to member.     Prashant Vargas  Care Management Specialist  [facility]  [phone]

## 2021-06-13 NOTE — PROGRESS NOTES
[facility] Care Coordination Contact    Per BRADLY SNOW delivered.     Prashant Vargas  Care Management Specialist  [facility]  [phone]

## 2021-06-13 NOTE — PROGRESS NOTES
ASSESSMENT/PLAN:  1. Fibromyalgia  lidocaine 10 mg/mL (1 %) injection 10 mL   2. Paroxysmal Atrial Fibrillation  INR   3. Chronic pain syndrome  oxyCODONE (ROXICODONE) 10 mg immediate release tablet       This is a 69 yo female with:  1.  Fibromyalgia/trigger point pain - here for 1 month follow up - does well with trigger point injections - this keeps her from increasing her use of opiates for pain.  See procedure note.    2.  Atrial Fib - due for INR  3.  Chronic Pain Syndrome - see #1 - will renew her Oxycodone    Return in about 1 month (around 12/23/2020) for trigger point injections.  Administrations This Visit     lidocaine 10 mg/mL (1 %) injection 10 mL     Admin Date  11/23/2020 Action  Given by Other Dose  10 mL Route  Intra-articular Administered By  Amanda Alexandra MA                Medications Discontinued During This Encounter   Medication Reason     oxyCODONE (ROXICODONE) 10 mg immediate release tablet      There are no Patient Instructions on file for this visit.    Chief Complaint:  Chief Complaint   Patient presents with     Injections     trigger point injections     Foot Pain     bunion left foot     Medication Refill     pain meds       HPI:   Mary Kay Tejada is a 70 y.o. female c/o  Stomach is better - taking her pills regularly  No bleeding    Needs pain meds    Needs trigger point shots    Wants refill of her pain meds      PMH:   Patient Active Problem List    Diagnosis Date Noted     Chronic gastric ulcer without hemorrhage and without perforation 10/19/2020     Advanced directives, counseling/discussion 08/17/2020     Primary osteoarthritis of both knees 01/17/2020     Mixed stress and urge urinary incontinence 08/16/2019     Skin lesion 07/17/2019     Chronic a-fib (H)      Dyslipidemia      Upper GI bleed 12/13/2018     Melena 12/13/2018     Anemia due to blood loss, acute 07/18/2018     Diabetic polyneuropathy associated with type 2 diabetes mellitus (H) 07/18/2018     Chronic anemia  06/27/2018     Cataract 11/06/2017     Bunion, left 07/19/2017     Callus of foot 07/19/2017     Nonrheumatic aortic valve stenosis 05/23/2017     COPD (chronic obstructive pulmonary disease) (H) 05/23/2017     Chronic obstructive pulmonary disease with acute lower respiratory infection (H) 12/24/2016     Gastroenteritis 12/24/2016     Syncope 12/22/2016     Opacity of lung on imaging study 12/22/2016     Acute gastritis 12/22/2016     Osteoarthritis of both knees 08/22/2016     Osteoarthritis, hip, bilateral 06/20/2016     Primary osteoarthritis of left knee 06/20/2016     Candidal intertrigo 05/11/2016     Type 2 diabetes mellitus with hypoglycemia (H)      Aspiration pneumonia (H) 03/01/2016     Controlled substance agreement signed 11/23/2015     Cervical neck pain with evidence of disc disease 07/22/2015     Headache 07/17/2015     Hematoma of abdominal wall 07/05/2015     Skin lesion of face 05/22/2015     Tendonitis of wrist, right 04/22/2015     Menopause 04/06/2015     Flashers or floaters of right eye 02/23/2015     Tendonitis of wrist, left 01/21/2015     Trigger point of thoracic region 01/21/2015     Generalized osteoarthrosis, involving multiple sites 01/14/2015     Vertigo 12/17/2014     Rotator cuff tendonitis 12/17/2014     Abnormal Weight Loss      Osteoarthritis Of The Shoulder      Chronic Constipation      Onychomycosis Of The Toenails      Diarrhea      Ganglion Of The Left Wrist      Larynx Edema      Obesity      Medial Epicondylitis      Skin Lesion      Urinary Incontinence      Chronic Major Depression      Dry Eye Syndrome      Nicotine Dependence      Hypokalemia      Essential hypertension      Muscle Cramps In The Calf      Edema      Atrial flutter (H)      Lump In / On The Skin      Chronic pain syndrome      Paroxysmal Atrial Fibrillation      Fibromyalgia      Nausea      Abdominal Pain      Peptic Ulcer      COPD exacerbation (H)      Mixed hyperlipidemia      Chronic Reflux  Esophagitis      Benign Adenomatous Polyp Of The Large Intestine      Past Medical History:   Diagnosis Date     Anemia      Aortic stenosis      Atrial fibrillation (H)      Atrial flutter (H)      Benign neoplasm of adenomatous polyp     large intestine      Chronic constipation      Chronic pain syndrome      COPD (chronic obstructive pulmonary disease) (H)     Oxygen at night      Dependence on supplemental oxygen     Oxygen at noc, during the day as needed     Depression      Diabetes mellitus (H)      Dry eye syndrome      Fibromyalgia      Ganglion     left wrist     GERD (gastroesophageal reflux disease)      Hyperlipidemia      Hypertension      Hypokalemia      Larynx edema      Lung disease      Malignant Vulvar Neoplasm     Created by Conversion [facility] Annotation: Apr 17 2007  8:24AM - Cammy Bui:  resection per Dr. Alfonso Mane 9/06;  Replacement Utility updated for latest IMO load     Medial epicondylitis      Onychomycosis      Osteoarthritis      Otitis Externa     Created by Conversion      Peptic ulcer      Polyneuropathy      Vulvar malignant neoplasm (H)      Past Surgical History:   Procedure Laterality Date     BREAST BIOPSY Right      BREAST BIOPSY Right 01/28/2015     BREAST BIOPSY Right 1/28/2015    Procedure: RIGHT BREAST BIOPSY AFTER WIRE LOCALIZATION AT 0940;  Surgeon: Renée Soriano MD;  Location: [facility] [place];  Service:      COLONOSCOPY N/A 6/14/2019    Procedure: COLONOSCOPY;  Surgeon: Eduardo Mora MD;  Location: [facility] [place];  Service: Gastroenterology     ESOPHAGOGASTRODUODENOSCOPY N/A 11/6/2018    Procedure: ESOPHAGOGASTRODUODENOSCOPY;  Surgeon: Lit Fernando MD;  Location: [facility] [place];  Service:      HYSTERECTOMY       JOINT REPLACEMENT Left     TKA     TN ABLATE HEART DYSRHYTHM FOCUS      Description: Catheter Ablation Atrial Fibrillation;  Recorded: 07/31/2012;  Comments: 7/24/12 PVI with Dr. Gardiner and nilay to all 5 pulm veins  and CTI fl ablation line as well.     OH BIOPSY OF BREAST, INCISIONAL      Description: Incisional Breast Biopsy;  Recorded: 11/13/2007;  Comments: benign     OH SUPRACERV ABD HYSTERECTOMY      Description: Supracervical Hysterectomy;  Proc Date: 01/01/1985;  Comments: some cervix left!; ovaries intact; done for bleeding     Social History     Socioeconomic History     Marital status:      Spouse name: Not on file     Number of children: Not on file     Years of education: Not on file     Highest education level: Not on file   Occupational History     Not on file   Social Needs     Financial resource strain: Not on file     Food insecurity     Worry: Not on file     Inability: Not on file     Transportation needs     Medical: Not on file     Non-medical: Not on file   Tobacco Use     Smoking status: Current Every Day Smoker     Packs/day: 0.50     Types: Cigarettes     Smokeless tobacco: Never Used   Substance and Sexual Activity     Alcohol use: Yes     Comment: very little     Drug use: No     Sexual activity: Not on file   Lifestyle     Physical activity     Days per week: Not on file     Minutes per session: Not on file     Stress: Not on file   Relationships     Social connections     Talks on phone: Not on file     Gets together: Not on file     Attends [employer] service: Not on file     Active member of club or organization: Not on file     Attends meetings of clubs or organizations: Not on file     Relationship status: Not on file     Intimate partner violence     Fear of current or ex partner: Not on file     Emotionally abused: Not on file     Physically abused: Not on file     Forced sexual activity: Not on file   Other Topics Concern     Not on file   Social History Narrative     Not on file     Family History   Problem Relation Age of Onset     Heart failure Mother      Cancer Other         paternal HX-laryngeal      Alcoholism Sister      No Medical Problems Daughter      No Medical Problems  "Maternal Grandmother      No Medical Problems Maternal Grandfather      No Medical Problems Paternal Grandmother      No Medical Problems Paternal Grandfather      No Medical Problems Maternal Aunt      No Medical Problems Paternal Aunt      Alcoholism Sister      Alcoholism Brother      Alcohol abuse Father      Cancer Paternal Uncle         Gastric-Alcohol     Cancer Paternal Uncle         gastric-Alcohol     BRCA 1/2 Neg Hx      Breast cancer Neg Hx      Colon cancer Neg Hx      Endometrial cancer Neg Hx      Ovarian cancer Neg Hx        Meds:    Current Outpatient Medications:      ACCU-CHEK SOFTCLIX LANCETS lancets, TEST THREE TIMES DAILY, Disp: 300 each, Rfl: 3     albuterol (PROAIR HFA;PROVENTIL HFA;VENTOLIN HFA) 90 mcg/actuation inhaler, INHALE 2 PUFFS EVERY 4 HOURS AS NEEDED WHEEZING, Disp: 90 g, Rfl: 11     albuterol (PROVENTIL) 2.5 mg /3 mL (0.083 %) nebulizer solution, Take 3 mL (2.5 mg total) by nebulization every 4 (four) hours as needed for wheezing or shortness of breath., Disp: 75 mL, Rfl: 12     atorvastatin (LIPITOR) 20 MG tablet, TAKE 1 TABLET(20 MG) BY MOUTH AT BEDTIME, Disp: 90 tablet, Rfl: 3     BD ULTRA-FINE SHORT PEN NEEDLE 31 gauge x 5/16\" Ndle, TEST FOUR TIMES DAILY WITH MEALS AND AT BEDTIME, Disp: 400 each, Rfl: 3     blood glucose test strips, Use 1 each As Directed 3 (three) times a day as needed (for blood glucose testing). Dispense strips that are for One Touch Verio IQ meter, Disp: 300 strip, Rfl: 3     blood-glucose meter (ONETOUCH VERIO IQ METER) Misc, Check blood sugar three times a day., Disp: 1 each, Rfl: 0     budesonide-formoteroL (SYMBICORT) 160-4.5 mcg/actuation inhaler, Inhale 2 puffs 2 (two) times a day. Inhale 2 puff by mouth twice daily, Disp: 1 Inhaler, Rfl: 2     diaper,brief,adult,disposable (ADULT BRIEFS - LARGE) Misc, Use 3-4 daily as needed for incontinence, Disp: 120 each, Rfl: 6     diltiazem (CARDIZEM CD) 120 MG 24 hr capsule, TAKE ONE CAPSULE BY MOUTH DAILY, " Disp: 90 capsule, Rfl: 1     famotidine (PEPCID) 20 MG tablet, Take 1 tablet (20 mg total) by mouth 2 (two) times a day., Disp: 180 tablet, Rfl: 3     furosemide (LASIX) 20 MG tablet, TAKE 1 TABLET(20 MG) BY MOUTH DAILY AS NEEDED, Disp: 90 tablet, Rfl: 3     gabapentin (NEURONTIN) 600 MG tablet, TAKE 1 TABLET(600 MG) BY MOUTH THREE TIMES DAILY, Disp: 270 tablet, Rfl: 3     generic lancets (FINGERSTIX LANCETS), Dispense brand per patient's insurance at pharmacy discretion., Disp: 300 each, Rfl: 0     ipratropium-albuterol (DUO-NEB) 0.5-2.5 mg/3 mL nebulizer, INAHALE 1 VIAL IN NEBULIZER EVERY 4 HOURS AS NEEDED, Disp: 1440 mL, Rfl: 0     meclizine (ANTIVERT) 25 mg tablet, TAKE 1 TABLET(25 MG) BY MOUTH THREE TIMES DAILY AS NEEDED FOR DIZZINESS OR NAUSEA, Disp: 45 tablet, Rfl: 5     metFORMIN (GLUCOPHAGE) 500 MG tablet, TAKE 1 TABLET(500 MG) BY MOUTH TWICE DAILY WITH MEALS, Disp: 180 tablet, Rfl: 3     mometasone-formoterol (DULERA) 200-5 mcg/actuation HFAA inhaler, Inhale 2 puffs 2 (two) times a day., Disp: 1 Inhaler, Rfl: 5     nicotine (NICODERM CQ) 21 mg/24 hr, Place 1 patch on the skin daily., Disp: 30 patch, Rfl: 1     nystatin (NYSTOP) powder, Apply 1 application topically 2 (two) times a day as needed. 2-3 times to affected area(s)., Disp: 60 g, Rfl: 3     omeprazole (PRILOSEC) 20 MG capsule, Take 1 capsule (20 mg total) by mouth daily before breakfast., Disp: 30 capsule, Rfl: 3     oxyCODONE (ROXICODONE) 10 mg immediate release tablet, Take 1 tablet (10 mg total) by mouth every 6 (six) hours as needed., Disp: 120 tablet, Rfl: 0     polyethylene glycol (MIRALAX) 17 gram packet, Take 1 packet (17 g total) by mouth daily. (Patient taking differently: Take 17 g by mouth daily as needed.    ), Disp: , Rfl: 0     sucralfate (CARAFATE) 1 gram tablet, TAKE 1 TABLET BY MOUTH FOUR TIMES DAILY(CRUSH TABLET AND MIX IT WITH A LITTLE WATER, THEN SWALLOW), Disp: 120 tablet, Rfl: 12     warfarin ANTICOAGULANT  "(COUMADIN/JANTOVEN) 5 MG tablet, Take 1/2-1 tablet (2.5-5 mg) daily as directed. Adjust dose per INR results., Disp: 90 tablet, Rfl: 1    Allergies:  Allergies   Allergen Reactions     Celebrex [Celecoxib] Rash     patient had butterfly rash - \"lupus-like\"       Latex Rash       ROS:  Pertinent positives as noted in HPI; otherwise 12 point ROS negative.      Physical Exam:  EXAM:  /68 (Patient Site: Right Arm, Patient Position: Sitting, Cuff Size: Adult Regular)   Pulse 71   Temp 98.2  F (36.8  C) (Tympanic)   Wt 151 lb (68.5 kg)   BMI 26.33 kg/m     Gen:  NAD, appears well, well-hydrated  HEENT:  TMs nl, oropharynx benign, nasal mucosa nl, conjunctiva clear  Neck:  Supple, no adenopathy, no thyromegaly, no carotid bruits, no JVD  Lungs:  Clear to auscultation bilaterally  Cor:  RRR no murmur  Abd:  Soft, nontender, BS+, no masses, no guarding or rebound, no HSM  Back:  Tender to palpation at trigger points in upper back, neck  Extr:  Neg.  Neuro:  No asymmetry  Skin:  Warm/dry        Results:  Results for orders placed or performed in visit on 11/23/20   INR   Result Value Ref Range    INR 2.20 (H) 0.90 - 1.10         Procedure Note - Trigger Point Injections    Consent obtained: verbal    Indication:  Fibromyalgia/tirgger point pains    6 trigger points identified.  Each trigger point swabbed x 3 with Betadine swab.  Each trigger point then injected with 1.5-2  ml of 1% Lidocaine (no epi).    Total volume injected: 10 ml    Complications:  None, patient tolerated procedure well.    Plan:  Discussed care with patient.  RTC for further injections in 30 days prn.        "

## 2021-06-13 NOTE — TELEPHONE ENCOUNTER
ANTICOAGULATION  MANAGEMENT    Assessment     Today's INR result of 2.8 is Therapeutic (goal INR of 2.0-3.0)        Warfarin taken as previously instructed    No new diet changes affecting INR    No new medication/supplements affecting INR    Continues to tolerate warfarin with no reported s/s of bleeding or thromboembolism     Previous INR was Therapeutic    Plan:     Spoke on phone with Mary Kay regarding INR result and instructed:     Warfarin Dosing Instructions:  Continue current warfarin dose 5 mg daily  (0 % change)    Instructed patient to follow up no later than: 4 weeks    Education provided: importance of therapeutic range, importance of following up for INR monitoring at instructed interval and importance of taking warfarin as instructed    Mary Kay verbalizes understanding and agrees to warfarin dosing plan.    Instructed to call the [facility] for any changes, questions or concerns. (#[phone])   ?   Mena Dumont RN    Subjective/Objective:      Mary Kaymarlene Tejada, a 70 y.o. female is on warfarin.     Mary Kay reports:     Home warfarin dose: verbally confirmed home dose with Mary Kay and updated on anticoagulation calendar     Missed doses: No     Medication changes:  No     S/S of bleeding or thromboembolism:  No     New Injury or illness:  No     Changes in diet or alcohol consumption:  No     Upcoming surgery, procedure or cardioversion:  No    Anticoagulation Episode Summary     Current INR goal:  2.0-3.0   TTR:  34.8 % (1 y)   Next INR check:  1/18/2021   INR from last check:  2.80 (12/21/2020)   Weekly max warfarin dose:     Target end date:  Indefinite   INR check location:     Preferred lab:     Send INR reminders to:  [facility]    Indications    Paroxysmal Atrial Fibrillation [I48.91]           Comments:           Anticoagulation Care Providers     Provider Role Specialty Phone number    Cammy Bui MD Referring Family Medicine [phone]

## 2021-06-13 NOTE — PROGRESS NOTES
ASSESSMENT/PLAN:  1. Fibromyalgia     2. Breast cancer screening     3. Chronic pain syndrome  oxyCODONE (ROXICODONE) 10 mg immediate release tablet   4. Screening mammogram, encounter for  Mammo Screening Bilateral   5. Paroxysmal atrial fibrillation  INR       This is a 66 yo female here for :  1.  Fibromyalgia/chronic pain - desires trigger point injections - these have been helpful in decreasing (at least not increasing) the amount of opiates she uses for pain control.  She has underlying arthritis - and can't use NSAIDs due to gastric ulcers in past.  Will renew oxycodone.  Trigger point injections as noted below.  2.  Atrial Fib - paroxysmal - check INR - followed by anticoag program  3 . Due for mammogram - will refer.        Medications Discontinued During This Encounter   Medication Reason     gabapentin (NEURONTIN) 300 MG capsule Duplicate order     oxyCODONE (ROXICODONE) 10 mg immediate release tablet Reorder     There are no Patient Instructions on file for this visit.    Chief Complaint:  Chief Complaint   Patient presents with     Injections     trigger point     Flu Vaccine       HPI:   Mary Kay Tejada is a 67 y.o. female c/o  Here for her monthly follow up:  1.  Fibromyalgia pain - its been a bad month - pain in upper right and left back/shoulder regions - desires trigger point injections  2.  Due for refill on her pain medications  3.  Due for mammogram     PMH:   Patient Active Problem List    Diagnosis Date Noted     Bunion 07/19/2017     Callus of foot 07/19/2017     Moderate aortic stenosis 05/23/2017     COPD (chronic obstructive pulmonary disease) 05/23/2017     DM neuropathy, type II diabetes mellitus 05/19/2017     Chronic obstructive pulmonary disease with acute lower respiratory infection 12/24/2016     Gastroenteritis 12/24/2016     Syncope 12/22/2016     Opacity of lung on imaging study 12/22/2016     Acute gastritis 12/22/2016     Osteoarthritis of both knees 08/22/2016      Osteoarthritis, hip, bilateral 06/20/2016     Primary osteoarthritis of left knee 06/20/2016     Candidal intertrigo 05/11/2016     Type 2 diabetes mellitus with hypoglycemia      Aspiration pneumonia 03/01/2016     Controlled substance agreement signed 11/23/2015     Cervical neck pain with evidence of disc disease 07/22/2015     Headache 07/17/2015     Hematoma of abdominal wall 07/05/2015     Skin lesion of face 05/22/2015     Tendonitis of wrist, right 04/22/2015     Menopause 04/06/2015     Flashers or floaters of right eye 02/23/2015     Tendonitis of wrist, left 01/21/2015     Trigger point of thoracic region 01/21/2015     Generalized osteoarthrosis, involving multiple sites 01/14/2015     Vertigo 12/17/2014     Rotator cuff tendonitis 12/17/2014     Abnormal Weight Loss      Osteoarthritis Of The Shoulder      Chronic Constipation      Onychomycosis Of The Toenails      Diarrhea      Ganglion Of The Left Wrist      Larynx Edema      Obesity      Malignant Vulvar Neoplasm      Medial Epicondylitis      Skin Lesion      Urinary Incontinence      Chronic Major Depression      Dry Eye Syndrome      Nicotine Dependence      Hypokalemia      Essential hypertension      Muscle Cramps In The Calf      Edema      Atrial flutter      Lump In / On The Skin      Type 2 diabetes mellitus with hyperglycemia      Chronic pain syndrome      Paroxysmal Atrial Fibrillation      Fibromyalgia      Nausea      Abdominal Pain      Peptic Ulcer      COPD exacerbation      Mixed hyperlipidemia      Chronic Reflux Esophagitis      Benign Adenomatous Polyp Of The Large Intestine      Past Medical History:   Diagnosis Date     Aortic stenosis      Atrial fibrillation      Atrial flutter      Benign neoplasm of adenomatous polyp     large intestine      Chronic constipation      Chronic pain syndrome      COPD (chronic obstructive pulmonary disease)     Oxygen at night      Depression      Diabetes mellitus      Dry eye syndrome       Fibromyalgia      Ganglion     left wrist     GERD (gastroesophageal reflux disease)      Hyperlipidemia      Hypertension      Hypokalemia      Larynx edema      Lung disease      Medial epicondylitis      Onychomycosis      Osteoarthritis      Otitis Externa     Created by Conversion      Peptic ulcer      Type 2 diabetes mellitus      Vulvar malignant neoplasm      Past Surgical History:   Procedure Laterality Date     BREAST BIOPSY Right      BREAST BIOPSY Right 01/28/2015     BREAST BIOPSY Right 1/28/2015    Procedure: RIGHT BREAST BIOPSY AFTER WIRE LOCALIZATION AT 0940;  Surgeon: Renée Soriano MD;  Location: [facility];  Service:      HYSTERECTOMY       AL ABLATE HEART DYSRHYTHM FOCUS      Description: Catheter Ablation Atrial Fibrillation;  Recorded: 07/31/2012;  Comments: 7/24/12 PVI with Dr. Gardiner and nilay to all 5 pulm veins and CTI fl ablation line as well.     AL BIOPSY OF BREAST, INCISIONAL      Description: Incisional Breast Biopsy;  Recorded: 11/13/2007;  Comments: benign     AL SUPRACERV ABD HYSTERECTOMY      Description: Supracervical Hysterectomy;  Proc Date: 01/01/1985;  Comments: some cervix left!; ovaries intact; done for bleeding     AL TOTAL ABDOM HYSTERECTOMY      Description: Total Abdominal Hysterectomy;  Recorded: 12/31/2013;     AL TOTAL KNEE ARTHROPLASTY      Description: Total Knee Arthroplasty;  Recorded: 11/13/2007;     Social History     Social History     Marital status:      Spouse name: N/A     Number of children: N/A     Years of education: N/A     Occupational History     Not on file.     Social History Main Topics     Smoking status: Light Tobacco Smoker     Types: Cigarettes     Last attempt to quit: 1/1/2014     Smokeless tobacco: Never Used      Comment: E-cig some day     Alcohol use Yes      Comment: very little     Drug use: No     Sexual activity: Not on file     Other Topics Concern     Not on file     Social History Narrative       Meds:    Current  "Outpatient Prescriptions:      albuterol (PROVENTIL) 2.5 mg /3 mL (0.083 %) nebulizer solution, Take 3 mL (2.5 mg total) by nebulization every 4 (four) hours as needed for wheezing or shortness of breath., Disp: 75 mL, Rfl: 12     albuterol (VENTOLIN HFA) 90 mcg/actuation inhaler, INHALE 1 TO 2 PUFFS BY MOUTH EVERY 4 TO 6 HOURS AS NEEDED, Disp: 18 g, Rfl: 6     atorvastatin (LIPITOR) 20 MG tablet, Take 1 tablet (20 mg total) by mouth bedtime., Disp: 90 tablet, Rfl: 3     BD INSULIN PEN NEEDLE UF SHORT 31 gauge x 5/16\" Ndle, TEST FOUR TIMES DAILY WITH MEALS AND AT BEDTIME, Disp: 300 each, Rfl: 2     blood glucose meter (GLUCOMETER), Use 1 each As Directed as needed. Dispense glucometer brand per patient's insurance at pharmacy discretion., Disp: 1 each, Rfl: 0     blood glucose test (ACCU-CHEK PEDRO PLUS TEST STRP) strips, Use 1 test strip to test 3 times daily. Dispense brand per patient's insurance at pharmacy discretion., Disp: 100 each, Rfl: 11     budesonide-formoterol (SYMBICORT) 160-4.5 mcg/actuation inhaler, Inhale 2 puffs 2 (two) times a day., Disp: 1 Inhaler, Rfl: 2     CARTIA  mg 24 hr capsule, TAKE ONE CAPSULE BY MOUTH DAILY, Disp: 90 capsule, Rfl: 1     codeine-guaiFENesin (GUAIFENESIN AC)  mg/5 mL liquid, Take 5 mL by mouth 2 (two) times a day as needed for cough., Disp: 240 mL, Rfl: 0     DAILY-FAYE tablet, TAKE 1 TABLET BY MOUTH EVERY DAY, Disp: 90 tablet, Rfl: 3     estradiol (ESTRACE) 2 MG tablet, TAKE 1 TABLET BY MOUTH EVERY DAY, Disp: 90 tablet, Rfl: 0     furosemide (LASIX) 20 MG tablet, Take 1 tablet (20 mg total) by mouth daily., Disp: 90 tablet, Rfl: 0     gabapentin (NEURONTIN) 100 MG capsule, TK 1-3 CS  PO HS, Disp: , Rfl: 9     generic lancets, Use 1 each As Directed 3 (three) times a day. Accu-Chek Soft Touch, Disp: 100 each, Rfl: 11     insulin aspart (NOVOLOG FLEXPEN) 100 unit/mL injection pen, Sliding scale QID (meals/bedtime): -180, 2 units; -220, 4 u; BG " 221-260, 6 u; -300, 8 u; -340, 10 u; BG >340, 12 u, Disp: 5 Pre-filled Pen Syringe, Rfl: 0     ipratropium-albuterol (DUO-NEB) 0.5-2.5 mg/3 mL nebulizer, INHALE 1 VIAL IN NEBULIZER EVERY 4 HOURS AS NEEDED, Disp: 1440 mL, Rfl: 0     leg brace (ELASTIC KNEE SUPPORT) Misc, Use 1 each As Directed daily., Disp: , Rfl: 0     loperamide (IMODIUM) 2 mg capsule, Take 2 capsules by mouth initially followed by 1 capsule after each loose stool bowel movement. Do not exceed 8 capsules per day (Patient taking differently: Take 2-4 mg by mouth see administration instructions. Take 2 capsules by mouth initially followed by 1 capsule after each loose stool bowel movement. Do not exceed 8 capsules per day), Disp: 30 capsule, Rfl: 1     meclizine (ANTIVERT) 25 mg tablet, Take 1 tablet (25 mg total) by mouth 3 (three) times a day as needed for dizziness or nausea., Disp: 30 tablet, Rfl: 3     metFORMIN (GLUCOPHAGE) 500 MG tablet, Take 1 tablet (500 mg total) by mouth 2 (two) times a day with meals., Disp: 60 tablet, Rfl: 6     MULTIVITAMIN WITH MINERALS (HAIR,SKIN AND NAILS ORAL), Take 3 tablets by mouth daily. Chewable formulation., Disp: , Rfl:      NEBULIZER/COMPRESSOR (NEBULIZER AND COMPRESSOR MISC), Use As Directed. Use UTD, Disp: , Rfl:      nicotine (NICODERM CQ) 21 mg/24 hr, APPLY ONE PATCH ON THE SKIN EVERY DAY, Disp: 28 patch, Rfl: 0     nystatin (NYSTOP) powder, Apply 1 application topically 2 (two) times a day as needed. 2-3 times to affected area(s)., Disp: 15 g, Rfl: 3     olopatadine (PATANOL) 0.1 % ophthalmic solution, Administer 1 drop to both eyes 2 (two) times a day as needed for allergies. , Disp: , Rfl:      omeprazole (PRILOSEC) 20 MG capsule, Take 1 capsule (20 mg total) by mouth 2 (two) times a day before meals., Disp: 60 capsule, Rfl: 6     oxyCODONE (ROXICODONE) 10 mg immediate release tablet, Take 1 tablet (10 mg total) by mouth every 6 (six) hours as needed., Disp: 120 tablet, Rfl: 0      "polymyxin B-trimethoprim (FOR POLYTRIM) 10,000 unit- 1 mg/mL Drop ophthalmic drops, 1 drop in affected eye q 3 hours while awake, Disp: 10 mL, Rfl: 0     predniSONE (DELTASONE) 10 mg tablet, 6 tabs po daily x 2 d, 5 tabs po daily x 2 d, 4 tabs po daily x 2 d,  3 tabs po daily x 2 d,  2 tabs po daily x 2 d,  1 tab po daily x 2 d, Disp: 42 tablet, Rfl: 0     ranitidine (ZANTAC) 150 MG tablet, TAKE 1 TABLET BY MOUTH TWICE DAILY, Disp: 60 tablet, Rfl: 6     senna-docusate (PERICOLACE) 8.6-50 mg tablet, Take 2 tablets by mouth daily as needed for constipation., Disp: 30 tablet, Rfl: 1     UNABLE TO FIND, Take 1 capsule by mouth daily. Med Name: Probiotic with green tea., Disp: , Rfl:      warfarin (COUMADIN) 5 MG tablet, TAKE 1 TABLET(5 MG) BY MOUTH DAILY, Disp: 90 tablet, Rfl: 0    Current Facility-Administered Medications:      lidocaine 10 mg/mL (1 %) injection 10 mL, 10 mL, Intradermal, Once, Cammy Bui MD    Allergies:  Allergies   Allergen Reactions     Celebrex [Celecoxib] Rash     patient had butterfly rash - \"lupus-like\"       Latex Rash       ROS:  Pertinent positives as noted in HPI; otherwise 12 point ROS negative.      Physical Exam:  EXAM:  /60 (Patient Site: Right Arm, Patient Position: Sitting, Cuff Size: Adult Regular)  Pulse 88  Temp 98.1  F (36.7  C) (Oral)   Resp 14  SpO2 99%   Gen:  NAD, appears well, well-hydrated  HEENT:  TMs nl, oropharynx benign, nasal mucosa nl, conjunctiva clear  Neck:  Supple, no adenopathy, no thyromegaly, no carotid bruits, no JVD  Lungs:  Clear to auscultation bilaterally  Cor:  RRR no murmur  Abd:  Soft, nontender, BS+, no masses, no guarding or rebound, no HSM  Extr:  Neg., trigger points identified in upper back/shoulders  Neuro:  No asymmetry  Skin:  Warm/dry        Results:  Results for orders placed or performed in visit on 10/20/17   INR   Result Value Ref Range    INR 2.30 (H) 0.90 - 1.10           Procedure Note - Trigger Point " Injections    Consent obtained: verbal    Indication:  Fibromyalgia/chronic pain    5 trigger points identified.  Each trigger point swabbed x 3 with Betadine swab.  Each trigger point then injected with 2 ml of 1% Lidocaine (no epi).    Total volume injected:  10 ml    Complications:  None, patient tolerated procedure well.    Plan:  Discussed care with patient.  RTC for further injections in 30 days prn.

## 2021-06-13 NOTE — TELEPHONE ENCOUNTER
I had multiple openings on my schedule earlier in day - not clear why she couldn't have had one of those appointments.  It would NOT be appropriate to wait until the 23rd if she is bleeding.

## 2021-06-13 NOTE — PROGRESS NOTES
ASSESSMENT/PLAN:  1. COPD exacerbation  predniSONE (DELTASONE) 10 mg tablet    amoxicillin-clavulanate (AUGMENTIN) 875-125 mg per tablet   2. Type 2 diabetes mellitus with diabetic neuropathy, without long-term current use of insulin  gabapentin (NEURONTIN) 300 MG capsule    DISCONTINUED: gabapentin (NEURONTIN) 300 MG capsule   3. Chronic pain syndrome  oxyCODONE (ROXICODONE) 10 mg immediate release tablet   4. Paroxysmal atrial fibrillation  INR   5. Fibromyalgia     6. Trigger point of thoracic region         This is a 67-year-old female, seen today for several things    1.  Patient has fibromyalgia with multiple trigger points.  Today she has significant discomfort in the left upper posterior shoulder region as well as the right.  For this reason we chose 3 trigger point on the left and one on the right to inject today.  Please refer to the procedure note below.  2.  Patient has underlying COPD, now with recent upper respiratory infection symptoms with perhaps sinusitis along with what appears to be a COPD exacerbation.  We will treat with prednisone, and antibiotic therapy.  Will use Augmentin p.o. twice daily for 10 days time.  3.  Patient does have underlying type 2 diabetes with now peripheral neuropathy.  She feels as if the gabapentin therapy has been useful, but feels like she could use more of this.  Will increase from nightly dosing to twice daily dosing.  We will see if she responds more positively to this.  4.  Patient is on coumadin for paroxysmal atrial fib.  INR today - she will hear from our anticoagulation team.     Administrations This Visit     lidocaine 10 mg/mL (1 %) injection 10 mL     Admin Date Action Dose Route Administered By             09/22/2017 Given 10 mL Intradermal Emma Brown CMA                          Medications Discontinued During This Encounter   Medication Reason     gabapentin (NEURONTIN) 100 MG capsule Reorder     oxyCODONE (ROXICODONE) 10 mg immediate release tablet  "Reorder     gabapentin (NEURONTIN) 300 MG capsule Reorder     There are no Patient Instructions on file for this visit.    Chief Complaint:  Chief Complaint   Patient presents with     Injections     trigger point injection on the shoulder       HPI:   Mary Kay Tejada is a 67 y.o. female c/o  Is scheduled on Tuesday for stress test -   Now, waiting on getting her shoes - hasn't gotten them yet    Blowing green stuff from nose  No fever; some chills    Fibromyalgia is really acting up  Worse on left side with some swelling at this time  Right side is \"always bad\"    Needs gabapentin and oxycodone    PMH:   Patient Active Problem List    Diagnosis Date Noted     Bunion 07/19/2017     Callus of foot 07/19/2017     Moderate aortic stenosis 05/23/2017     COPD (chronic obstructive pulmonary disease) 05/23/2017     DM neuropathy, type II diabetes mellitus 05/19/2017     Chronic obstructive pulmonary disease with acute lower respiratory infection 12/24/2016     Gastroenteritis 12/24/2016     Syncope 12/22/2016     Opacity of lung on imaging study 12/22/2016     Acute gastritis 12/22/2016     Osteoarthritis of both knees 08/22/2016     Osteoarthritis, hip, bilateral 06/20/2016     Primary osteoarthritis of left knee 06/20/2016     Candidal intertrigo 05/11/2016     Type 2 diabetes mellitus with hypoglycemia      Aspiration pneumonia 03/01/2016     Controlled substance agreement signed 11/23/2015     Cervical neck pain with evidence of disc disease 07/22/2015     Headache 07/17/2015     Hematoma of abdominal wall 07/05/2015     Skin lesion of face 05/22/2015     Tendonitis of wrist, right 04/22/2015     Menopause 04/06/2015     Flashers or floaters of right eye 02/23/2015     Tendonitis of wrist, left 01/21/2015     Trigger point of thoracic region 01/21/2015     Generalized osteoarthrosis, involving multiple sites 01/14/2015     Vertigo 12/17/2014     Rotator cuff tendonitis 12/17/2014     Abnormal Weight Loss      " Osteoarthritis Of The Shoulder      Chronic Constipation      Onychomycosis Of The Toenails      Diarrhea      Ganglion Of The Left Wrist      Larynx Edema      Obesity      Malignant Vulvar Neoplasm      Medial Epicondylitis      Skin Lesion      Urinary Incontinence      Chronic Major Depression      Dry Eye Syndrome      Nicotine Dependence      Hypokalemia      Essential hypertension      Muscle Cramps In The Calf      Edema      Atrial flutter      Lump In / On The Skin      Type 2 diabetes mellitus with hyperglycemia      Chronic pain syndrome      Paroxysmal Atrial Fibrillation      Fibromyalgia      Nausea      Abdominal Pain      Peptic Ulcer      COPD exacerbation      Mixed hyperlipidemia      Chronic Reflux Esophagitis      Benign Adenomatous Polyp Of The Large Intestine      Past Medical History:   Diagnosis Date     Aortic stenosis      Atrial fibrillation      Atrial flutter      Benign neoplasm of adenomatous polyp     large intestine      Chronic constipation      Chronic pain syndrome      COPD (chronic obstructive pulmonary disease)     Oxygen at night      Depression      Diabetes mellitus      Dry eye syndrome      Fibromyalgia      Ganglion     left wrist     GERD (gastroesophageal reflux disease)      Hyperlipidemia      Hypertension      Hypokalemia      Larynx edema      Lung disease      Medial epicondylitis      Onychomycosis      Osteoarthritis      Otitis Externa     Created by Conversion      Peptic ulcer      Type 2 diabetes mellitus      Vulvar malignant neoplasm      Past Surgical History:   Procedure Laterality Date     BREAST BIOPSY Right      BREAST BIOPSY Right 01/28/2015     BREAST BIOPSY Right 1/28/2015    Procedure: RIGHT BREAST BIOPSY AFTER WIRE LOCALIZATION AT 0940;  Surgeon: Renée Soriano MD;  Location: [facility] [place];  Service:      HYSTERECTOMY       LA ABLATE HEART DYSRHYTHM FOCUS      Description: Catheter Ablation Atrial Fibrillation;  Recorded: 07/31/2012;   "Comments: 7/24/12 PVI with Dr. Gardiner and nilay to all 5 pulm veins and CTI fl ablation line as well.     ME BIOPSY OF BREAST, INCISIONAL      Description: Incisional Breast Biopsy;  Recorded: 11/13/2007;  Comments: benign     ME SUPRACERV ABD HYSTERECTOMY      Description: Supracervical Hysterectomy;  Proc Date: 01/01/1985;  Comments: some cervix left!; ovaries intact; done for bleeding     ME TOTAL ABDOM HYSTERECTOMY      Description: Total Abdominal Hysterectomy;  Recorded: 12/31/2013;     ME TOTAL KNEE ARTHROPLASTY      Description: Total Knee Arthroplasty;  Recorded: 11/13/2007;     Social History     Social History     Marital status:      Spouse name: N/A     Number of children: N/A     Years of education: N/A     Occupational History     Not on file.     Social History Main Topics     Smoking status: Light Tobacco Smoker     Types: Cigarettes     Last attempt to quit: 1/1/2014     Smokeless tobacco: Never Used      Comment: E-cig some day     Alcohol use Yes      Comment: very little     Drug use: No     Sexual activity: Not on file     Other Topics Concern     Not on file     Social History Narrative       Meds:    Current Outpatient Prescriptions:      albuterol (PROVENTIL) 2.5 mg /3 mL (0.083 %) nebulizer solution, Take 3 mL (2.5 mg total) by nebulization every 4 (four) hours as needed for wheezing or shortness of breath., Disp: 75 mL, Rfl: 12     albuterol (VENTOLIN HFA) 90 mcg/actuation inhaler, INHALE 1 TO 2 PUFFS BY MOUTH EVERY 4 TO 6 HOURS AS NEEDED, Disp: 18 g, Rfl: 6     atorvastatin (LIPITOR) 20 MG tablet, Take 1 tablet (20 mg total) by mouth bedtime., Disp: 90 tablet, Rfl: 3     BD INSULIN PEN NEEDLE UF SHORT 31 gauge x 5/16\" Ndle, TEST FOUR TIMES DAILY WITH MEALS AND AT BEDTIME, Disp: 300 each, Rfl: 2     blood glucose meter (GLUCOMETER), Use 1 each As Directed as needed. Dispense glucometer brand per patient's insurance at pharmacy discretion., Disp: 1 each, Rfl: 0     blood glucose test " (ACCU-CHEK PEDRO PLUS TEST STRP) strips, Use 1 test strip to test 3 times daily. Dispense brand per patient's insurance at pharmacy discretion., Disp: 100 each, Rfl: 11     budesonide-formoterol (SYMBICORT) 160-4.5 mcg/actuation inhaler, Inhale 2 puffs 2 (two) times a day., Disp: 1 Inhaler, Rfl: 2     CARTIA  mg 24 hr capsule, TAKE ONE CAPSULE BY MOUTH DAILY, Disp: 90 capsule, Rfl: 1     codeine-guaiFENesin (GUAIFENESIN AC)  mg/5 mL liquid, Take 5 mL by mouth 2 (two) times a day as needed for cough., Disp: 240 mL, Rfl: 0     DAILY-FAYE tablet, TAKE 1 TABLET BY MOUTH EVERY DAY, Disp: 90 tablet, Rfl: 3     estradiol (ESTRACE) 2 MG tablet, TAKE 1 TABLET BY MOUTH EVERY DAY, Disp: 90 tablet, Rfl: 0     furosemide (LASIX) 20 MG tablet, TAKE 1 TABLET(20 MG) BY MOUTH DAILY, Disp: 30 tablet, Rfl: 5     gabapentin (NEURONTIN) 300 MG capsule, Take 1 capsule (300 mg total) by mouth 2 (two) times a day. TAKE 1 CAPSULE BY MOUTH AT BEDTIME, Disp: 60 capsule, Rfl: 6     generic lancets, Use 1 each As Directed 3 (three) times a day. Accu-Chek Soft Touch, Disp: 100 each, Rfl: 11     insulin aspart (NOVOLOG FLEXPEN) 100 unit/mL injection pen, Sliding scale QID (meals/bedtime): -180, 2 units; -220, 4 u; -260, 6 u; -300, 8 u; -340, 10 u; BG >340, 12 u, Disp: 5 Pre-filled Pen Syringe, Rfl: 0     ipratropium-albuterol (DUO-NEB) 0.5-2.5 mg/3 mL nebulizer, INHALE 1 VIAL IN NEBULIZER EVERY 4 HOURS AS NEEDED, Disp: 1440 mL, Rfl: 0     leg brace (ELASTIC KNEE SUPPORT) Misc, Use 1 each As Directed daily., Disp: , Rfl: 0     loperamide (IMODIUM) 2 mg capsule, Take 2 capsules by mouth initially followed by 1 capsule after each loose stool bowel movement. Do not exceed 8 capsules per day (Patient taking differently: Take 2-4 mg by mouth see administration instructions. Take 2 capsules by mouth initially followed by 1 capsule after each loose stool bowel movement. Do not exceed 8 capsules per day), Disp: 30  capsule, Rfl: 1     meclizine (ANTIVERT) 25 mg tablet, Take 1 tablet (25 mg total) by mouth 3 (three) times a day as needed for dizziness or nausea., Disp: 30 tablet, Rfl: 3     metFORMIN (GLUCOPHAGE) 500 MG tablet, Take 1 tablet (500 mg total) by mouth 2 (two) times a day with meals., Disp: 60 tablet, Rfl: 6     MULTIVITAMIN WITH MINERALS (HAIR,SKIN AND NAILS ORAL), Take 3 tablets by mouth daily. Chewable formulation., Disp: , Rfl:      NEBULIZER/COMPRESSOR (NEBULIZER AND COMPRESSOR MISC), Use As Directed. Use UTD, Disp: , Rfl:      nicotine (NICODERM CQ) 21 mg/24 hr, APPLY ONE PATCH ON THE SKIN EVERY DAY, Disp: 28 patch, Rfl: 0     nystatin (NYSTOP) powder, Apply 1 application topically 2 (two) times a day as needed. 2-3 times to affected area(s)., Disp: 15 g, Rfl: 3     olopatadine (PATANOL) 0.1 % ophthalmic solution, Administer 1 drop to both eyes 2 (two) times a day as needed for allergies. , Disp: , Rfl:      omeprazole (PRILOSEC) 20 MG capsule, Take 1 capsule (20 mg total) by mouth 2 (two) times a day before meals., Disp: 60 capsule, Rfl: 6     oxyCODONE (ROXICODONE) 10 mg immediate release tablet, Take 1 tablet (10 mg total) by mouth every 6 (six) hours as needed., Disp: 120 tablet, Rfl: 0     polymyxin B-trimethoprim (FOR POLYTRIM) 10,000 unit- 1 mg/mL Drop ophthalmic drops, 1 drop in affected eye q 3 hours while awake, Disp: 10 mL, Rfl: 0     ranitidine (ZANTAC) 150 MG tablet, TAKE 1 TABLET BY MOUTH TWICE DAILY, Disp: 60 tablet, Rfl: 6     senna-docusate (PERICOLACE) 8.6-50 mg tablet, Take 2 tablets by mouth daily as needed for constipation., Disp: 30 tablet, Rfl: 1     UNABLE TO FIND, Take 1 capsule by mouth daily. Med Name: Probiotic with green tea., Disp: , Rfl:      warfarin (COUMADIN) 5 MG tablet, TAKE 1 TABLET(5 MG) BY MOUTH DAILY, Disp: 90 tablet, Rfl: 0     amoxicillin-clavulanate (AUGMENTIN) 875-125 mg per tablet, Take 1 tablet by mouth 2 (two) times a day for 10 days., Disp: 20 tablet, Rfl: 0      "predniSONE (DELTASONE) 10 mg tablet, 6 tabs po daily x 2 d, 5 tabs po daily x 2 d, 4 tabs po daily x 2 d,  3 tabs po daily x 2 d,  2 tabs po daily x 2 d,  1 tab po daily x 2 d, Disp: 42 tablet, Rfl: 0    Current Facility-Administered Medications:      lidocaine 10 mg/mL (1 %) injection 10 mL, 10 mL, Intradermal, Once, Cammy Bui MD    Allergies:  Allergies   Allergen Reactions     Celebrex [Celecoxib] Rash     patient had butterfly rash - \"lupus-like\"       Latex Rash       ROS:  Pertinent positives as noted in HPI; otherwise 12 point ROS negative.      Physical Exam:  EXAM:  /68 (Patient Site: Right Arm, Patient Position: Sitting, Cuff Size: Adult Large)  Pulse 94  Resp 18  Wt 199 lb (90.3 kg)  BMI 33.63 kg/m2   Gen:  NAD, appears well, well-hydrated  HEENT:  TMs nl, oropharynx benign, nasal mucosa congested and swollen, thick rhinorrhea; conjunctiva clear  Neck:  Supple, no adenopathy, no thyromegaly, no carotid bruits, no JVD  Lungs:  Coarse breath sounds bilaterally  Cor:  Irreg no murmur  Abd:  Soft, nontender, BS+, no masses, no guarding or rebound, no HSM  Extr:  DJD - knees, tr edema, mild sensory changes in distal toes; mild erythema of lower anterior shins  Back:  Multiple trigger points aaliyah suprascapular - bilaterally,  Mild swelling in left upper back  Neuro:  No asymmetry  Skin:  Warm/dry        Results:  Results for orders placed or performed in visit on 09/22/17   INR   Result Value Ref Range    INR 2.70 (H) 0.90 - 1.10         Procedure Note - Trigger Point Injections    Consent obtained: verbal    Indication:  Fibromyalgia, trigger point pain    5 trigger points identified (3 on left suprascapular region, 2 on right suprascapular region).  Each trigger point swabbed x 3 with Betadine swab.  Each trigger point then injected with 2 ml of 1% Lidocaine (no epi).    Total volume injected:  10 ml    Complications:  None, patient tolerated procedure well.    Plan:  Discussed care " with patient.  RTC for further injections in 30 days prn.

## 2021-06-13 NOTE — PROGRESS NOTES
ASSESSMENT/PLAN:  1. Fibromyalgia  lidocaine 10 mg/mL (1 %) injection 10 mL   2. Paroxysmal Atrial Fibrillation  INR   3. Chronic pain syndrome  oxyCODONE (ROXICODONE) 10 mg immediate release tablet       This is a 71 yo female with:  1.  Fibromyalgia - here for trigger point injections - this keeps her from needed increasing doses of opiates for pain control.   Has had trigger point injections in the past - these were repeated today -   No follow-ups on file.  Administrations This Visit     lidocaine 10 mg/mL (1 %) injection 10 mL     Admin Date  12/21/2020 Action  Given by Other Dose  10 mL Route  Intra-articular Administered By  Amanda Alexandra MA              2.  Paroxysmal A Fib - will need INR today - on chronic warfarin therapy  3.  Chronic Pain Syndrome - uses Oxycodone - refilled today  Medications Discontinued During This Encounter   Medication Reason     oxyCODONE (ROXICODONE) 10 mg immediate release tablet Reorder     There are no Patient Instructions on file for this visit.    Chief Complaint:  Chief Complaint   Patient presents with     trigger point shots     Medication Refill       HPI:   Mary Kay Tejada is a 70 y.o. female c/o  1.  Here for trigger point -   2.  Chronic pain syndrome     PMH:   Patient Active Problem List    Diagnosis Date Noted     Chronic gastric ulcer without hemorrhage and without perforation 10/19/2020     Advanced directives, counseling/discussion 08/17/2020     Primary osteoarthritis of both knees 01/17/2020     Mixed stress and urge urinary incontinence 08/16/2019     Skin lesion 07/17/2019     Chronic a-fib (H)      Dyslipidemia      Upper GI bleed 12/13/2018     Melena 12/13/2018     Anemia due to blood loss, acute 07/18/2018     Diabetic polyneuropathy associated with type 2 diabetes mellitus (H) 07/18/2018     Chronic anemia 06/27/2018     Cataract 11/06/2017     Bunion, left 07/19/2017     Callus of foot 07/19/2017     Nonrheumatic aortic valve stenosis 05/23/2017     COPD  (chronic obstructive pulmonary disease) (H) 05/23/2017     Chronic obstructive pulmonary disease with acute lower respiratory infection (H) 12/24/2016     Gastroenteritis 12/24/2016     Syncope 12/22/2016     Opacity of lung on imaging study 12/22/2016     Acute gastritis 12/22/2016     Osteoarthritis of both knees 08/22/2016     Osteoarthritis, hip, bilateral 06/20/2016     Primary osteoarthritis of left knee 06/20/2016     Candidal intertrigo 05/11/2016     Type 2 diabetes mellitus with hypoglycemia (H)      Aspiration pneumonia (H) 03/01/2016     Controlled substance agreement signed 11/23/2015     Cervical neck pain with evidence of disc disease 07/22/2015     Headache 07/17/2015     Hematoma of abdominal wall 07/05/2015     Skin lesion of face 05/22/2015     Tendonitis of wrist, right 04/22/2015     Menopause 04/06/2015     Flashers or floaters of right eye 02/23/2015     Tendonitis of wrist, left 01/21/2015     Trigger point of thoracic region 01/21/2015     Generalized osteoarthrosis, involving multiple sites 01/14/2015     Vertigo 12/17/2014     Rotator cuff tendonitis 12/17/2014     Abnormal Weight Loss      Osteoarthritis Of The Shoulder      Chronic Constipation      Onychomycosis Of The Toenails      Diarrhea      Ganglion Of The Left Wrist      Larynx Edema      Obesity      Medial Epicondylitis      Skin Lesion      Urinary Incontinence      Chronic Major Depression      Dry Eye Syndrome      Nicotine Dependence      Hypokalemia      Essential hypertension      Muscle Cramps In The Calf      Edema      Atrial flutter (H)      Lump In / On The Skin      Chronic pain syndrome      Paroxysmal Atrial Fibrillation      Fibromyalgia      Nausea      Abdominal Pain      Peptic Ulcer      COPD exacerbation (H)      Mixed hyperlipidemia      Chronic Reflux Esophagitis      Benign Adenomatous Polyp Of The Large Intestine      Past Medical History:   Diagnosis Date     Anemia      Aortic stenosis      Atrial  fibrillation (H)      Atrial flutter (H)      Benign neoplasm of adenomatous polyp     large intestine      Chronic constipation      Chronic pain syndrome      COPD (chronic obstructive pulmonary disease) (H)     Oxygen at night      Dependence on supplemental oxygen     Oxygen at noc, during the day as needed     Depression      Diabetes mellitus (H)      Dry eye syndrome      Fibromyalgia      Ganglion     left wrist     GERD (gastroesophageal reflux disease)      Hyperlipidemia      Hypertension      Hypokalemia      Larynx edema      Lung disease      Malignant Vulvar Neoplasm     Created by Conversion [facility] Annotation: Apr 17 2007  8:24AM - Cammy Bui:  resection per Dr. Alfonso Mane 9/06;  Replacement Utility updated for latest IMO load     Medial epicondylitis      Onychomycosis      Osteoarthritis      Otitis Externa     Created by Conversion      Peptic ulcer      Polyneuropathy      Vulvar malignant neoplasm (H)      Past Surgical History:   Procedure Laterality Date     BREAST BIOPSY Right      BREAST BIOPSY Right 01/28/2015     BREAST BIOPSY Right 1/28/2015    Procedure: RIGHT BREAST BIOPSY AFTER WIRE LOCALIZATION AT 0940;  Surgeon: Renée Soriano MD;  Location: [facility];  Service:      COLONOSCOPY N/A 6/14/2019    Procedure: COLONOSCOPY;  Surgeon: Eduardo Mora MD;  Location: [facility];  Service: Gastroenterology     ESOPHAGOGASTRODUODENOSCOPY N/A 11/6/2018    Procedure: ESOPHAGOGASTRODUODENOSCOPY;  Surgeon: Lit Fernando MD;  Location: [facility];  Service:      HYSTERECTOMY       JOINT REPLACEMENT Left     TKA     MI ABLATE HEART DYSRHYTHM FOCUS      Description: Catheter Ablation Atrial Fibrillation;  Recorded: 07/31/2012;  Comments: 7/24/12 PVI with Dr. Gardiner and nilay to all 5 pulm veins and CTI fl ablation line as well.     MI BIOPSY OF BREAST, INCISIONAL      Description: Incisional Breast Biopsy;  Recorded: 11/13/2007;  Comments: benign      VA SUPRACERV ABD HYSTERECTOMY      Description: Supracervical Hysterectomy;  Proc Date: 01/01/1985;  Comments: some cervix left!; ovaries intact; done for bleeding     Social History     Socioeconomic History     Marital status:      Spouse name: Not on file     Number of children: Not on file     Years of education: Not on file     Highest education level: Not on file   Occupational History     Not on file   Social Needs     Financial resource strain: Not on file     Food insecurity     Worry: Not on file     Inability: Not on file     Transportation needs     Medical: Not on file     Non-medical: Not on file   Tobacco Use     Smoking status: Current Every Day Smoker     Packs/day: 0.50     Types: Cigarettes     Smokeless tobacco: Never Used   Substance and Sexual Activity     Alcohol use: Yes     Comment: very little     Drug use: No     Sexual activity: Not on file   Lifestyle     Physical activity     Days per week: Not on file     Minutes per session: Not on file     Stress: Not on file   Relationships     Social connections     Talks on phone: Not on file     Gets together: Not on file     Attends Congregation service: Not on file     Active member of club or organization: Not on file     Attends meetings of clubs or organizations: Not on file     Relationship status: Not on file     Intimate partner violence     Fear of current or ex partner: Not on file     Emotionally abused: Not on file     Physically abused: Not on file     Forced sexual activity: Not on file   Other Topics Concern     Not on file   Social History Narrative     Not on file     Family History   Problem Relation Age of Onset     Heart failure Mother      Cancer Other         paternal HX-laryngeal      Alcoholism Sister      No Medical Problems Daughter      No Medical Problems Maternal Grandmother      No Medical Problems Maternal Grandfather      No Medical Problems Paternal Grandmother      No Medical Problems Paternal  "Grandfather      No Medical Problems Maternal Aunt      No Medical Problems Paternal Aunt      Alcoholism Sister      Alcoholism Brother      Alcohol abuse Father      Cancer Paternal Uncle         Gastric-Alcohol     Cancer Paternal Uncle         gastric-Alcohol     BRCA 1/2 Neg Hx      Breast cancer Neg Hx      Colon cancer Neg Hx      Endometrial cancer Neg Hx      Ovarian cancer Neg Hx        Meds:    Current Outpatient Medications:      ACCU-CHEK SOFTCLIX LANCETS lancets, TEST THREE TIMES DAILY, Disp: 300 each, Rfl: 3     albuterol (PROAIR HFA;PROVENTIL HFA;VENTOLIN HFA) 90 mcg/actuation inhaler, INHALE 2 PUFFS EVERY 4 HOURS AS NEEDED WHEEZING, Disp: 90 g, Rfl: 11     albuterol (PROVENTIL) 2.5 mg /3 mL (0.083 %) nebulizer solution, Take 3 mL (2.5 mg total) by nebulization every 4 (four) hours as needed for wheezing or shortness of breath., Disp: 75 mL, Rfl: 12     atorvastatin (LIPITOR) 20 MG tablet, TAKE 1 TABLET(20 MG) BY MOUTH AT BEDTIME, Disp: 90 tablet, Rfl: 3     BD ULTRA-FINE SHORT PEN NEEDLE 31 gauge x 5/16\" Ndle, TEST FOUR TIMES DAILY WITH MEALS AND AT BEDTIME, Disp: 400 each, Rfl: 3     blood-glucose meter (ONETOUCH VERIO IQ METER) Misc, Check blood sugar three times a day., Disp: 1 each, Rfl: 0     budesonide-formoteroL (SYMBICORT) 160-4.5 mcg/actuation inhaler, Inhale 2 puffs 2 (two) times a day. Inhale 2 puff by mouth twice daily, Disp: 1 Inhaler, Rfl: 2     diaper,brief,adult,disposable (ADULT BRIEFS - LARGE) Misc, Use 3-4 daily as needed for incontinence, Disp: 120 each, Rfl: 6     diltiazem (CARDIZEM CD) 120 MG 24 hr capsule, TAKE ONE CAPSULE BY MOUTH DAILY, Disp: 90 capsule, Rfl: 1     famotidine (PEPCID) 20 MG tablet, Take 1 tablet (20 mg total) by mouth 2 (two) times a day., Disp: 180 tablet, Rfl: 3     furosemide (LASIX) 20 MG tablet, TAKE 1 TABLET(20 MG) BY MOUTH DAILY AS NEEDED, Disp: 90 tablet, Rfl: 3     gabapentin (NEURONTIN) 600 MG tablet, TAKE 1 TABLET(600 MG) BY MOUTH THREE TIMES " "DAILY, Disp: 270 tablet, Rfl: 3     generic lancets (FINGERSTIX LANCETS), Dispense brand per patient's insurance at pharmacy discretion., Disp: 300 each, Rfl: 0     ipratropium-albuterol (DUO-NEB) 0.5-2.5 mg/3 mL nebulizer, INAHALE 1 VIAL IN NEBULIZER EVERY 4 HOURS AS NEEDED, Disp: 1440 mL, Rfl: 0     meclizine (ANTIVERT) 25 mg tablet, TAKE 1 TABLET(25 MG) BY MOUTH THREE TIMES DAILY AS NEEDED FOR DIZZINESS OR NAUSEA, Disp: 45 tablet, Rfl: 5     metFORMIN (GLUCOPHAGE) 500 MG tablet, TAKE 1 TABLET(500 MG) BY MOUTH TWICE DAILY WITH MEALS, Disp: 180 tablet, Rfl: 3     mometasone-formoterol (DULERA) 200-5 mcg/actuation HFAA inhaler, Inhale 2 puffs 2 (two) times a day., Disp: 1 Inhaler, Rfl: 5     nicotine (NICODERM CQ) 21 mg/24 hr, Place 1 patch on the skin daily., Disp: 30 patch, Rfl: 1     nystatin (NYSTOP) powder, Apply 1 application topically 2 (two) times a day as needed. 2-3 times to affected area(s)., Disp: 60 g, Rfl: 3     omeprazole (PRILOSEC) 20 MG capsule, Take 1 capsule (20 mg total) by mouth daily before breakfast., Disp: 30 capsule, Rfl: 3     ONETOUCH VERIO TEST STRIPS strips, USE 1 EACH AS DIRECTED THREE TIMES DAILY AS NEEDED, Disp: 300 strip, Rfl: 3     oxyCODONE (ROXICODONE) 10 mg immediate release tablet, Take 1 tablet (10 mg total) by mouth every 6 (six) hours as needed., Disp: 120 tablet, Rfl: 0     polyethylene glycol (MIRALAX) 17 gram packet, Take 1 packet (17 g total) by mouth daily. (Patient taking differently: Take 17 g by mouth daily as needed.    ), Disp: , Rfl: 0     sucralfate (CARAFATE) 1 gram tablet, TAKE 1 TABLET BY MOUTH FOUR TIMES DAILY(CRUSH TABLET AND MIX IT WITH A LITTLE WATER, THEN SWALLOW), Disp: 120 tablet, Rfl: 12     warfarin ANTICOAGULANT (COUMADIN/JANTOVEN) 5 MG tablet, TAKE 1/2 TO 1 TABLET BY MOUTH DAILY AS DIRECTED, Disp: 90 tablet, Rfl: 1    Allergies:  Allergies   Allergen Reactions     Celebrex [Celecoxib] Rash     patient had butterfly rash - \"lupus-like\"       Latex " Rash       ROS:  Pertinent positives as noted in HPI; otherwise 12 point ROS negative.      Physical Exam:  EXAM:  /60 (Patient Site: Right Arm, Patient Position: Sitting, Cuff Size: Adult Regular)   Pulse 74   Temp 97  F (36.1  C) (Temporal)   Resp 20   Wt 150 lb (68 kg)   BMI 26.15 kg/m     Gen:  NAD, appears well, well-hydrated  HEENT:  TMs nl, oropharynx benign, nasal mucosa nl, conjunctiva clear  Neck:  Supple, no adenopathy, no thyromegaly, no carotid bruits, no JVD  Lungs:  Clear to auscultation bilaterally  Cor:  RRR no murmur  Abd:  Soft, nontender, BS+, no masses, no guarding or rebound, no HSM  Back:  Tender to palpation at upper thoracic trigger points  Extr:  Neg.  Neuro:  No asymmetry  Skin:  Warm/dry        Results:  Results for orders placed or performed in visit on 12/21/20   INR   Result Value Ref Range    INR 2.80 (H) 0.90 - 1.10           Procedure Note - Trigger Point Injections    Consent obtained: verbal    Indication:  Fibromyalgia, trigger point    6 trigger points identified.  Each trigger point swabbed x 3 with Betadine swab.  Each trigger point then injected with 1-2 ml of 1% Lidocaine (no epi).    Total volume injected:  10 ml    Complications:  None, patient tolerated procedure well.    Plan:  Discussed care with patient.  RTC for further injections in 30 days prn.

## 2021-06-13 NOTE — TELEPHONE ENCOUNTER
ANTICOAGULATION  MANAGEMENT PROGRAM    Mary Kay Tejada is overdue for INR check.     Spoke with Mary Kay and patient will have INR checked at next office visit on 12/21.      Marija Crawley RN

## 2021-06-13 NOTE — PROGRESS NOTES
[facility] Care Coordination Contact    Faxed signed pca sig page to provider. Resent POC signature page to member to sign and return asap.    Prashant Vargas  Care Management Specialist  [facility]  [phone]       Patient states that she feels better, patient ambulated to the waiting room. Patient states that she rather sit out there and wait for her ride. Patient given prescriptions. Patient waiting for  to arrive. MD made aware.

## 2021-06-13 NOTE — TELEPHONE ENCOUNTER
ANTICOAGULATION  MANAGEMENT    Assessment     Today's INR result of 2.2 is Therapeutic (goal INR of 2.0-3.0)        Warfarin taken as previously instructed    No new diet changes affecting INR    No new medication/supplements affecting INR    Continues to tolerate warfarin with no reported s/s of bleeding or thromboembolism     Previous INR was Subtherapeutic    Plan:     Spoke on phone with Mary Kay regarding INR result and instructed:     Warfarin Dosing Instructions:  Continue current warfarin dose 5 mg daily (0 % change)    Instructed patient to follow up no later than: two weeks    Education provided: importance of therapeutic range, importance of following up for INR monitoring at instructed interval and importance of taking warfarin as instructed    Mary Kay verbalizes understanding and agrees to warfarin dosing plan.    Instructed to call the [facility] for any changes, questions or concerns. (#[phone])   ?   Mena Dumont RN    Subjective/Objective:      Mary Kay Tejada, a 70 y.o. female is on warfarin.     Mary Kay reports:     Home warfarin dose: verbally confirmed home dose with Mary Kay and updated on anticoagulation calendar     Missed doses: No     Medication changes:  No     S/S of bleeding or thromboembolism:  No     New Injury or illness:  No     Changes in diet or alcohol consumption:  No     Upcoming surgery, procedure or cardioversion:  No    Anticoagulation Episode Summary     Current INR goal:  2.0-3.0   TTR:  27.2 % (1 y)   Next INR check:  12/7/2020   INR from last check:  2.20 (11/23/2020)   Weekly max warfarin dose:     Target end date:  Indefinite   INR check location:     Preferred lab:     Send INR reminders to:  [facility]    Indications    Paroxysmal Atrial Fibrillation [I48.91]           Comments:           Anticoagulation Care Providers     Provider Role Specialty Phone number    Cammy Bui MD Referring Family Medicine [phone]

## 2021-06-13 NOTE — PROGRESS NOTES
ASSESSMENT/PLAN:  1. Peptic ulcer  HM1(CBC and Differential)    Iron and Transferrin Iron Binding Capacity   2. Black stool  HM1(CBC and Differential)    Iron and Transferrin Iron Binding Capacity   3. Paroxysmal Atrial Fibrillation  INR   4. Type 2 diabetes mellitus with hypoglycemia without coma, with long-term current use of insulin (H)  gabapentin (NEURONTIN) 600 MG tablet    Comprehensive Metabolic Panel    Glycosylated Hemoglobin A1c   5. COPD exacerbation (H)  mometasone-formoterol (DULERA) 200-5 mcg/actuation HFAA inhaler   6. Mixed hyperlipidemia  Comprehensive Metabolic Panel    Lipid Cascade FASTING       This is a 69 yo female with:  1.  H/o peptic ulcer - recent reported black stools ; she is on anticoagulant therapy as well (with most recent INR:  Lab Results   Component Value Date    INR 1.30 (H) 11/11/2020    INR 1.80 (H) 10/19/2020    INR 1.90 (H) 09/21/2020   Feels poorly with little appetite - weak - fatigue  We reviewed labs today:  Lab Results   Component Value Date    HGB 12.8 11/11/2020     Likely not bleeding (as the black stools occurred last week).  Discussed with patient.   2.  Paroxysmal Atrial Fibrillation - on warfarin -   3.  Type II DM - due for lab monitoring - will check A1c  4.  COPD - generally stable - on inhaler therapy; uses oxygen prn/noc turnal  5.  Hyperlipidemia - due for lab monitoring of medication use - tolerating okay    Return in about 2 weeks (around 11/25/2020) for Recheck.      Medications Discontinued During This Encounter   Medication Reason     cyclobenzaprine (FLEXERIL) 10 MG tablet Therapy completed     gabapentin (NEURONTIN) 600 MG tablet Reorder     gentamicin (GARAMYCIN) 0.1 % ointment Therapy completed     insulin aspart U-100 (NOVOLOG) 100 unit/mL injection Allergic response     lidocaine 1%-EPINEPHrine 1:100,000 1 %-1:100,000 injection 10 mL (XYLOCAINE W/EPI)      mometasone-formoterol (DULERA) 200-5 mcg/actuation HFAA inhaler Reorder     There are no  "Patient Instructions on file for this visit.    Chief Complaint:  Chief Complaint   Patient presents with     INR Check     ulcer       HPI:   Mary Kay Tejada is a 70 y.o. female c/o  \"I know this is the ulcer\"   INR was low - hasn't been able to eat  Feels like there is lots of gas in her belly    Doesn't take the Furosemide - \"I don't need it\" - no swelling  Uses it more in summer    Taking Sucralfate - 2 instead of 1 - that seems to help     Not taking premeal insulin - x years    Hasn't had a recent scope - EGD x 2 years - \"because I haven't had a problem\"    Black stools x 3 days last week - started after eating spicy chicken fajitas  No dark stools x 3 days this week      PMH:   Patient Active Problem List    Diagnosis Date Noted     Chronic gastric ulcer without hemorrhage and without perforation 10/19/2020     Advanced directives, counseling/discussion 08/17/2020     Primary osteoarthritis of both knees 01/17/2020     Mixed stress and urge urinary incontinence 08/16/2019     Skin lesion 07/17/2019     Chronic a-fib (H)      Dyslipidemia      Upper GI bleed 12/13/2018     Melena 12/13/2018     Anemia due to blood loss, acute 07/18/2018     Diabetic polyneuropathy associated with type 2 diabetes mellitus (H) 07/18/2018     Chronic anemia 06/27/2018     Cataract 11/06/2017     Bunion, left 07/19/2017     Callus of foot 07/19/2017     Nonrheumatic aortic valve stenosis 05/23/2017     COPD (chronic obstructive pulmonary disease) (H) 05/23/2017     Chronic obstructive pulmonary disease with acute lower respiratory infection (H) 12/24/2016     Gastroenteritis 12/24/2016     Syncope 12/22/2016     Opacity of lung on imaging study 12/22/2016     Acute gastritis 12/22/2016     Osteoarthritis of both knees 08/22/2016     Osteoarthritis, hip, bilateral 06/20/2016     Primary osteoarthritis of left knee 06/20/2016     Candidal intertrigo 05/11/2016     Type 2 diabetes mellitus with hypoglycemia (H)      Aspiration " pneumonia (H) 03/01/2016     Controlled substance agreement signed 11/23/2015     Cervical neck pain with evidence of disc disease 07/22/2015     Headache 07/17/2015     Hematoma of abdominal wall 07/05/2015     Skin lesion of face 05/22/2015     Tendonitis of wrist, right 04/22/2015     Menopause 04/06/2015     Flashers or floaters of right eye 02/23/2015     Tendonitis of wrist, left 01/21/2015     Trigger point of thoracic region 01/21/2015     Generalized osteoarthrosis, involving multiple sites 01/14/2015     Vertigo 12/17/2014     Rotator cuff tendonitis 12/17/2014     Abnormal Weight Loss      Osteoarthritis Of The Shoulder      Chronic Constipation      Onychomycosis Of The Toenails      Diarrhea      Ganglion Of The Left Wrist      Larynx Edema      Obesity      Medial Epicondylitis      Skin Lesion      Urinary Incontinence      Chronic Major Depression      Dry Eye Syndrome      Nicotine Dependence      Hypokalemia      Essential hypertension      Muscle Cramps In The Calf      Edema      Atrial flutter (H)      Lump In / On The Skin      Chronic pain syndrome      Paroxysmal Atrial Fibrillation      Fibromyalgia      Nausea      Abdominal Pain      Peptic Ulcer      COPD exacerbation (H)      Mixed hyperlipidemia      Chronic Reflux Esophagitis      Benign Adenomatous Polyp Of The Large Intestine      Past Medical History:   Diagnosis Date     Anemia      Aortic stenosis      Atrial fibrillation (H)      Atrial flutter (H)      Benign neoplasm of adenomatous polyp     large intestine      Chronic constipation      Chronic pain syndrome      COPD (chronic obstructive pulmonary disease) (H)     Oxygen at night      Dependence on supplemental oxygen     Oxygen at noc, during the day as needed     Depression      Diabetes mellitus (H)      Dry eye syndrome      Fibromyalgia      Ganglion     left wrist     GERD (gastroesophageal reflux disease)      Hyperlipidemia      Hypertension      Hypokalemia       Larynx edema      Lung disease      Malignant Vulvar Neoplasm     Created by Conversion [facility] Annotation: Apr 17 2007  8:24AM - Cammy Bui:  resection per Dr. Alfonso Mane 9/06;  Replacement Utility updated for latest IMO load     Medial epicondylitis      Onychomycosis      Osteoarthritis      Otitis Externa     Created by Conversion      Peptic ulcer      Polyneuropathy      Vulvar malignant neoplasm (H)      Past Surgical History:   Procedure Laterality Date     BREAST BIOPSY Right      BREAST BIOPSY Right 01/28/2015     BREAST BIOPSY Right 1/28/2015    Procedure: RIGHT BREAST BIOPSY AFTER WIRE LOCALIZATION AT 0940;  Surgeon: Renée Soriano MD;  Location: [facility];  Service:      COLONOSCOPY N/A 6/14/2019    Procedure: COLONOSCOPY;  Surgeon: Eduardo Mora MD;  Location: [facility];  Service: Gastroenterology     ESOPHAGOGASTRODUODENOSCOPY N/A 11/6/2018    Procedure: ESOPHAGOGASTRODUODENOSCOPY;  Surgeon: Lit Fernando MD;  Location: [facility];  Service:      HYSTERECTOMY       JOINT REPLACEMENT Left     TKA     TN ABLATE HEART DYSRHYTHM FOCUS      Description: Catheter Ablation Atrial Fibrillation;  Recorded: 07/31/2012;  Comments: 7/24/12 PVI with Dr. Gardiner and nilay to all 5 pulm veins and CTI fl ablation line as well.     TN BIOPSY OF BREAST, INCISIONAL      Description: Incisional Breast Biopsy;  Recorded: 11/13/2007;  Comments: benign     TN SUPRACERV ABD HYSTERECTOMY      Description: Supracervical Hysterectomy;  Proc Date: 01/01/1985;  Comments: some cervix left!; ovaries intact; done for bleeding     Social History     Socioeconomic History     Marital status:      Spouse name: Not on file     Number of children: Not on file     Years of education: Not on file     Highest education level: Not on file   Occupational History     Not on file   Social Needs     Financial resource strain: Not on file     Food insecurity     Worry: Not on file      Inability: Not on file     Transportation needs     Medical: Not on file     Non-medical: Not on file   Tobacco Use     Smoking status: Current Every Day Smoker     Packs/day: 0.50     Types: Cigarettes     Smokeless tobacco: Never Used   Substance and Sexual Activity     Alcohol use: Yes     Comment: very little     Drug use: No     Sexual activity: Not on file   Lifestyle     Physical activity     Days per week: Not on file     Minutes per session: Not on file     Stress: Not on file   Relationships     Social connections     Talks on phone: Not on file     Gets together: Not on file     Attends [employer] service: Not on file     Active member of club or organization: Not on file     Attends meetings of clubs or organizations: Not on file     Relationship status: Not on file     Intimate partner violence     Fear of current or ex partner: Not on file     Emotionally abused: Not on file     Physically abused: Not on file     Forced sexual activity: Not on file   Other Topics Concern     Not on file   Social History Narrative     Not on file     Family History   Problem Relation Age of Onset     Heart failure Mother      Cancer Other         paternal HX-laryngeal      Alcoholism Sister      No Medical Problems Daughter      No Medical Problems Maternal Grandmother      No Medical Problems Maternal Grandfather      No Medical Problems Paternal Grandmother      No Medical Problems Paternal Grandfather      No Medical Problems Maternal Aunt      No Medical Problems Paternal Aunt      Alcoholism Sister      Alcoholism Brother      Alcohol abuse Father      Cancer Paternal Uncle         Gastric-Alcohol     Cancer Paternal Uncle         gastric-Alcohol     BRCA 1/2 Neg Hx      Breast cancer Neg Hx      Colon cancer Neg Hx      Endometrial cancer Neg Hx      Ovarian cancer Neg Hx        Meds:    Current Outpatient Medications:      ACCU-CHEK SOFTCLIX LANCETS lancets, TEST THREE TIMES DAILY, Disp: 300 each, Rfl: 3      "albuterol (PROAIR HFA;PROVENTIL HFA;VENTOLIN HFA) 90 mcg/actuation inhaler, INHALE 2 PUFFS EVERY 4 HOURS AS NEEDED WHEEZING, Disp: 90 g, Rfl: 11     albuterol (PROVENTIL) 2.5 mg /3 mL (0.083 %) nebulizer solution, Take 3 mL (2.5 mg total) by nebulization every 4 (four) hours as needed for wheezing or shortness of breath., Disp: 75 mL, Rfl: 12     atorvastatin (LIPITOR) 20 MG tablet, TAKE 1 TABLET(20 MG) BY MOUTH AT BEDTIME, Disp: 90 tablet, Rfl: 3     BD ULTRA-FINE SHORT PEN NEEDLE 31 gauge x 5/16\" Ndle, TEST FOUR TIMES DAILY WITH MEALS AND AT BEDTIME, Disp: 400 each, Rfl: 3     blood glucose test strips, Use 1 each As Directed 3 (three) times a day as needed (for blood glucose testing). Dispense strips that are for One Touch Verio IQ meter, Disp: 300 strip, Rfl: 3     blood-glucose meter (ONETOUCH VERIO IQ METER) Misc, Check blood sugar three times a day., Disp: 1 each, Rfl: 0     budesonide-formoteroL (SYMBICORT) 160-4.5 mcg/actuation inhaler, Inhale 2 puffs 2 (two) times a day. Inhale 2 puff by mouth twice daily, Disp: 1 Inhaler, Rfl: 2     diaper,brief,adult,disposable (ADULT BRIEFS - LARGE) Misc, Use 3-4 daily as needed for incontinence, Disp: 120 each, Rfl: 6     diltiazem (CARDIZEM CD) 120 MG 24 hr capsule, TAKE ONE CAPSULE BY MOUTH DAILY, Disp: 90 capsule, Rfl: 1     famotidine (PEPCID) 20 MG tablet, Take 1 tablet (20 mg total) by mouth 2 (two) times a day., Disp: 180 tablet, Rfl: 3     furosemide (LASIX) 20 MG tablet, TAKE 1 TABLET(20 MG) BY MOUTH DAILY AS NEEDED, Disp: 90 tablet, Rfl: 3     gabapentin (NEURONTIN) 600 MG tablet, TAKE 1 TABLET(600 MG) BY MOUTH THREE TIMES DAILY, Disp: 270 tablet, Rfl: 3     generic lancets (FINGERSTIX LANCETS), Dispense brand per patient's insurance at pharmacy discretion., Disp: 300 each, Rfl: 0     ipratropium-albuterol (DUO-NEB) 0.5-2.5 mg/3 mL nebulizer, INAHALE 1 VIAL IN NEBULIZER EVERY 4 HOURS AS NEEDED, Disp: 1440 mL, Rfl: 0     meclizine (ANTIVERT) 25 mg tablet, TAKE " "1 TABLET(25 MG) BY MOUTH THREE TIMES DAILY AS NEEDED FOR DIZZINESS OR NAUSEA, Disp: 45 tablet, Rfl: 5     metFORMIN (GLUCOPHAGE) 500 MG tablet, TAKE 1 TABLET(500 MG) BY MOUTH TWICE DAILY WITH MEALS, Disp: 180 tablet, Rfl: 3     mometasone-formoterol (DULERA) 200-5 mcg/actuation HFAA inhaler, Inhale 2 puffs 2 (two) times a day., Disp: 1 Inhaler, Rfl: 5     nicotine (NICODERM CQ) 21 mg/24 hr, Place 1 patch on the skin daily., Disp: 30 patch, Rfl: 1     nystatin (NYSTOP) powder, Apply 1 application topically 2 (two) times a day as needed. 2-3 times to affected area(s)., Disp: 60 g, Rfl: 3     omeprazole (PRILOSEC) 20 MG capsule, Take 1 capsule (20 mg total) by mouth daily before breakfast., Disp: 30 capsule, Rfl: 3     oxyCODONE (ROXICODONE) 10 mg immediate release tablet, Take 1 tablet (10 mg total) by mouth every 6 (six) hours as needed., Disp: 120 tablet, Rfl: 0     polyethylene glycol (MIRALAX) 17 gram packet, Take 1 packet (17 g total) by mouth daily. (Patient taking differently: Take 17 g by mouth daily as needed.    ), Disp: , Rfl: 0     sucralfate (CARAFATE) 1 gram tablet, TAKE 1 TABLET BY MOUTH FOUR TIMES DAILY(CRUSH TABLET AND MIX IT WITH A LITTLE WATER, THEN SWALLOW), Disp: 120 tablet, Rfl: 12     warfarin ANTICOAGULANT (COUMADIN/JANTOVEN) 5 MG tablet, Take 1/2-1 tablet (2.5-5 mg) daily as directed. Adjust dose per INR results., Disp: 90 tablet, Rfl: 1    Allergies:  Allergies   Allergen Reactions     Celebrex [Celecoxib] Rash     patient had butterfly rash - \"lupus-like\"       Latex Rash       ROS:  Pertinent positives as noted in HPI; otherwise 12 point ROS negative.      Physical Exam:  EXAM:  /52 (Patient Site: Left Arm, Patient Position: Sitting, Cuff Size: Adult Regular)   Pulse 76   Resp 20   Wt 155 lb 1.6 oz (70.4 kg)   BMI 27.04 kg/m     Gen:  NAD, appears chronically ill,  well-hydrated  HEENT:  TMs nl, oropharynx benign, nasal mucosa nl, conjunctiva clear  Neck:  Supple, no adenopathy, " no thyromegaly, no carotid bruits, no JVD  Lungs:  Clear to auscultation bilaterally  Cor:  RRR no murmur  Abd:  Soft, nontender, BS+, no masses, no guarding or rebound, no HSM  Extr:  Neg.  Neuro:  No asymmetry, Nl motor tone/strength, nl sensation, reflexes =, gait nl, nl coordination, CN intact,   Skin:  Warm/dry        Results:  Results for orders placed or performed in visit on 11/11/20   INR   Result Value Ref Range    INR 1.30 (H) 0.90 - 1.10   Iron and Transferrin Iron Binding Capacity   Result Value Ref Range    Iron 58 42 - 175 ug/dL    Transferrin 386 (H) 212 - 360 mg/dL    Transferrin Saturation, Calculated 12 (L) 20 - 50 %    Transferrin IBC, Calculated 483 313 - 563 ug/dL   Comprehensive Metabolic Panel   Result Value Ref Range    Sodium 141 136 - 145 mmol/L    Potassium 3.9 3.5 - 5.0 mmol/L    Chloride 102 98 - 107 mmol/L    CO2 25 22 - 31 mmol/L    Anion Gap, Calculation 14 5 - 18 mmol/L    Glucose 79 70 - 125 mg/dL    BUN 13 8 - 28 mg/dL    Creatinine 0.62 0.60 - 1.10 mg/dL    GFR MDRD Af Amer >60 >60 mL/min/1.73m2    GFR MDRD Non Af Amer >60 >60 mL/min/1.73m2    Bilirubin, Total 0.5 0.0 - 1.0 mg/dL    Calcium 9.5 8.5 - 10.5 mg/dL    Protein, Total 6.7 6.0 - 8.0 g/dL    Albumin 3.8 3.5 - 5.0 g/dL    Alkaline Phosphatase 79 45 - 120 U/L    AST 16 0 - 40 U/L    ALT 14 0 - 45 U/L   Glycosylated Hemoglobin A1c   Result Value Ref Range    Hemoglobin A1c 5.8 (H) <=5.6 %   Lipid Cascade FASTING   Result Value Ref Range    Cholesterol 174 <=199 mg/dL    Triglycerides 67 <=149 mg/dL    HDL Cholesterol 80 >=50 mg/dL    LDL Calculated 81 <=129 mg/dL    Patient Fasting > 8hrs? No    HM1 (CBC with Diff)   Result Value Ref Range    WBC 7.9 4.0 - 11.0 thou/uL    RBC 4.25 3.80 - 5.40 mill/uL    Hemoglobin 12.8 12.0 - 16.0 g/dL    Hematocrit 39.5 35.0 - 47.0 %    MCV 93 80 - 100 fL    MCH 30.1 27.0 - 34.0 pg    MCHC 32.3 32.0 - 36.0 g/dL    RDW 12.5 11.0 - 14.5 %    Platelets 229 140 - 440 thou/uL    MPV 7.7 7.0 -  10.0 fL    Neutrophils % 57 50 - 70 %    Lymphocytes % 32 20 - 40 %    Monocytes % 8 2 - 10 %    Eosinophils % 2 0 - 6 %    Basophils % 1 0 - 2 %    Neutrophils Absolute 4.5 2.0 - 7.7 thou/uL    Lymphocytes Absolute 2.5 0.8 - 4.4 thou/uL    Monocytes Absolute 0.7 0.0 - 0.9 thou/uL    Eosinophils Absolute 0.2 0.0 - 0.4 thou/uL    Basophils Absolute 0.0 0.0 - 0.2 thou/uL

## 2021-06-13 NOTE — TELEPHONE ENCOUNTER
It is hard to determine extent of bleed with a telephone visit.  But let's start there, I guess.

## 2021-06-14 NOTE — TELEPHONE ENCOUNTER
Reason for call:  Patient reporting a symptom    Symptom or request: pt had covid test this am its negative, they told her she had a heart murmur and she should inform her DrMat Of this Duration (how long have symptoms been present): today    Have you been treated for this before? No    Additional comments: she just wanted to make sure  is aware of this     Phone Number patient can be reached at:    Cell number on file:    Telephone Information:   Mobile [phone]       Best Time:  anytime  Can we leave a detailed message on this number: No call back needed    Call taken on 12/30/2020 at 11:37 AM by Geneva Prieto

## 2021-06-14 NOTE — PROGRESS NOTES
"ASSESSMENT/PLAN:  1. Essential hypertension  Lipid Profile    Comprehensive Metabolic Panel   2. Paroxysmal atrial fibrillation  INR   3. Candidal intertrigo  nystatin (NYSTOP) powder   4. Chronic pain syndrome  oxyCODONE (ROXICODONE) 10 mg immediate release tablet   5. Moderate aortic stenosis     6. Type 2 diabetes mellitus with hyperglycemia, without long-term current use of insulin  Glycosylated Hemoglobin A1c       66 yo female with:  1.  Paroxysmal Atrial Fibrillation - INR low today; managed by anticoagulation program; denies forgetting any doses recently.  2.  Labs drawn today for other conditions.  3.  Chronic pain syndrome - uses Oxycodone  4.  Fibromyalgia - trigger point injections today         Medications Discontinued During This Encounter   Medication Reason     codeine-guaiFENesin (GUAIFENESIN AC)  mg/5 mL liquid Therapy completed     estradiol (ESTRACE) 2 MG tablet Therapy completed     nystatin (NYSTOP) powder Reorder     oxyCODONE (ROXICODONE) 10 mg immediate release tablet Reorder     There are no Patient Instructions on file for this visit.    Chief Complaint:  Chief Complaint   Patient presents with     Injections     trigger point        HPI:   Mary Kay Tejada is a 67 y.o. female c/o  Here for :  1.  Trigger point injections - \"really bad this month\" - \"can you give me more than usual?\"  2.  Needs lab work  3.  Needs INR -     PMH:   Patient Active Problem List    Diagnosis Date Noted     Cataract 11/06/2017     Bunion 07/19/2017     Callus of foot 07/19/2017     Moderate aortic stenosis 05/23/2017     COPD (chronic obstructive pulmonary disease) 05/23/2017     DM neuropathy, type II diabetes mellitus 05/19/2017     Chronic obstructive pulmonary disease with acute lower respiratory infection 12/24/2016     Gastroenteritis 12/24/2016     Syncope 12/22/2016     Opacity of lung on imaging study 12/22/2016     Acute gastritis 12/22/2016     Osteoarthritis of both knees 08/22/2016     " Osteoarthritis, hip, bilateral 06/20/2016     Primary osteoarthritis of left knee 06/20/2016     Candidal intertrigo 05/11/2016     Type 2 diabetes mellitus with hypoglycemia      Aspiration pneumonia 03/01/2016     Controlled substance agreement signed 11/23/2015     Cervical neck pain with evidence of disc disease 07/22/2015     Headache 07/17/2015     Hematoma of abdominal wall 07/05/2015     Skin lesion of face 05/22/2015     Tendonitis of wrist, right 04/22/2015     Menopause 04/06/2015     Flashers or floaters of right eye 02/23/2015     Tendonitis of wrist, left 01/21/2015     Trigger point of thoracic region 01/21/2015     Generalized osteoarthrosis, involving multiple sites 01/14/2015     Vertigo 12/17/2014     Rotator cuff tendonitis 12/17/2014     Abnormal Weight Loss      Osteoarthritis Of The Shoulder      Chronic Constipation      Onychomycosis Of The Toenails      Diarrhea      Ganglion Of The Left Wrist      Larynx Edema      Obesity      Malignant Vulvar Neoplasm      Medial Epicondylitis      Skin Lesion      Urinary Incontinence      Chronic Major Depression      Dry Eye Syndrome      Nicotine Dependence      Hypokalemia      Essential hypertension      Muscle Cramps In The Calf      Edema      Atrial flutter      Lump In / On The Skin      Type 2 diabetes mellitus with hyperglycemia      Chronic pain syndrome      Paroxysmal Atrial Fibrillation      Fibromyalgia      Nausea      Abdominal Pain      Peptic Ulcer      COPD exacerbation      Mixed hyperlipidemia      Chronic Reflux Esophagitis      Benign Adenomatous Polyp Of The Large Intestine      Past Medical History:   Diagnosis Date     Aortic stenosis      Atrial fibrillation      Atrial flutter      Benign neoplasm of adenomatous polyp     large intestine      Chronic constipation      Chronic pain syndrome      COPD (chronic obstructive pulmonary disease)     Oxygen at night      Depression      Diabetes mellitus      Dry eye syndrome       Fibromyalgia      Ganglion     left wrist     GERD (gastroesophageal reflux disease)      Hyperlipidemia      Hypertension      Hypokalemia      Larynx edema      Lung disease      Medial epicondylitis      Onychomycosis      Osteoarthritis      Otitis Externa     Created by Conversion      Peptic ulcer      Type 2 diabetes mellitus      Vulvar malignant neoplasm      Past Surgical History:   Procedure Laterality Date     BREAST BIOPSY Right      BREAST BIOPSY Right 01/28/2015     BREAST BIOPSY Right 1/28/2015    Procedure: RIGHT BREAST BIOPSY AFTER WIRE LOCALIZATION AT 0940;  Surgeon: Renée Soriano MD;  Location: [facility];  Service:      HYSTERECTOMY       NJ ABLATE HEART DYSRHYTHM FOCUS      Description: Catheter Ablation Atrial Fibrillation;  Recorded: 07/31/2012;  Comments: 7/24/12 PVI with Dr. Gardiner and nilay to all 5 pulm veins and CTI fl ablation line as well.     NJ BIOPSY OF BREAST, INCISIONAL      Description: Incisional Breast Biopsy;  Recorded: 11/13/2007;  Comments: benign     NJ SUPRACERV ABD HYSTERECTOMY      Description: Supracervical Hysterectomy;  Proc Date: 01/01/1985;  Comments: some cervix left!; ovaries intact; done for bleeding     NJ TOTAL ABDOM HYSTERECTOMY      Description: Total Abdominal Hysterectomy;  Recorded: 12/31/2013;     NJ TOTAL KNEE ARTHROPLASTY      Description: Total Knee Arthroplasty;  Recorded: 11/13/2007;     Social History     Social History     Marital status:      Spouse name: N/A     Number of children: N/A     Years of education: N/A     Occupational History     Not on file.     Social History Main Topics     Smoking status: Light Tobacco Smoker     Types: Cigarettes     Last attempt to quit: 1/1/2014     Smokeless tobacco: Never Used      Comment: E-cig some day     Alcohol use Yes      Comment: very little     Drug use: No     Sexual activity: Not on file     Other Topics Concern     Not on file     Social History Narrative       Meds:    Current  "Outpatient Prescriptions:      albuterol (PROVENTIL) 2.5 mg /3 mL (0.083 %) nebulizer solution, Take 3 mL (2.5 mg total) by nebulization every 4 (four) hours as needed for wheezing or shortness of breath., Disp: 75 mL, Rfl: 12     albuterol (VENTOLIN HFA) 90 mcg/actuation inhaler, INHALE 1 TO 2 PUFFS BY MOUTH EVERY 4 TO 6 HOURS AS NEEDED, Disp: 18 g, Rfl: 6     atorvastatin (LIPITOR) 20 MG tablet, TAKE 1 TABLET(20 MG) BY MOUTH AT BEDTIME, Disp: 90 tablet, Rfl: 0     BD INSULIN PEN NEEDLE UF SHORT 31 gauge x 5/16\" Ndle, TEST FOUR TIMES DAILY WITH MEALS AND AT BEDTIME, Disp: 300 each, Rfl: 2     blood glucose meter (GLUCOMETER), Use 1 each As Directed as needed. Dispense glucometer brand per patient's insurance at pharmacy discretion., Disp: 1 each, Rfl: 0     blood glucose test (ACCU-CHEK PEDRO PLUS TEST STRP) strips, Use 1 test strip to test 3 times daily. Dispense brand per patient's insurance at pharmacy discretion., Disp: 100 each, Rfl: 11     budesonide-formoterol (SYMBICORT) 160-4.5 mcg/actuation inhaler, Inhale 2 puffs 2 (two) times a day., Disp: 1 Inhaler, Rfl: 2     CARTIA  mg 24 hr capsule, TAKE ONE CAPSULE BY MOUTH DAILY, Disp: 90 capsule, Rfl: 1     DAILY-FAYE tablet, TAKE 1 TABLET BY MOUTH EVERY DAY, Disp: 90 tablet, Rfl: 3     furosemide (LASIX) 20 MG tablet, Take 1 tablet (20 mg total) by mouth daily., Disp: 90 tablet, Rfl: 0     gabapentin (NEURONTIN) 300 MG capsule, , Disp: , Rfl: 6     generic lancets, Use 1 each As Directed 3 (three) times a day. Accu-Chek Soft Touch, Disp: 100 each, Rfl: 11     ipratropium-albuterol (DUO-NEB) 0.5-2.5 mg/3 mL nebulizer, INHALE 1 VIAL IN NEBULIZER EVERY 4 HOURS AS NEEDED, Disp: 1440 mL, Rfl: 0     leg brace (ELASTIC KNEE SUPPORT) Misc, Use 1 each As Directed daily., Disp: , Rfl: 0     loperamide (IMODIUM) 2 mg capsule, Take 2 capsules by mouth initially followed by 1 capsule after each loose stool bowel movement. Do not exceed 8 capsules per day (Patient " taking differently: Take 2-4 mg by mouth see administration instructions. Take 2 capsules by mouth initially followed by 1 capsule after each loose stool bowel movement. Do not exceed 8 capsules per day), Disp: 30 capsule, Rfl: 1     meclizine (ANTIVERT) 25 mg tablet, TAKE 1 TABLET(25 MG) BY MOUTH THREE TIMES DAILY AS NEEDED FOR DIZZINESS OR NAUSEA, Disp: 30 tablet, Rfl: 5     metFORMIN (GLUCOPHAGE) 500 MG tablet, Take 1 tablet (500 mg total) by mouth 2 (two) times a day with meals., Disp: 60 tablet, Rfl: 6     MULTIVITAMIN WITH MINERALS (HAIR,SKIN AND NAILS ORAL), Take 3 tablets by mouth daily. Chewable formulation., Disp: , Rfl:      NEBULIZER/COMPRESSOR (NEBULIZER AND COMPRESSOR MISC), Use As Directed. Use UTD, Disp: , Rfl:      nystatin (NYSTOP) powder, Apply 1 application topically 2 (two) times a day as needed. 2-3 times to affected area(s)., Disp: 15 g, Rfl: 3     olopatadine (PATANOL) 0.1 % ophthalmic solution, Administer 1 drop to both eyes 2 (two) times a day as needed for allergies. , Disp: , Rfl:      omeprazole (PRILOSEC) 20 MG capsule, Take 1 capsule (20 mg total) by mouth 2 (two) times a day before meals., Disp: 60 capsule, Rfl: 6     oxyCODONE (ROXICODONE) 10 mg immediate release tablet, Take 1 tablet (10 mg total) by mouth every 6 (six) hours as needed., Disp: 120 tablet, Rfl: 0     polymyxin B-trimethoprim (FOR POLYTRIM) 10,000 unit- 1 mg/mL Drop ophthalmic drops, 1 drop in affected eye q 3 hours while awake, Disp: 10 mL, Rfl: 0     ranitidine (ZANTAC) 150 MG tablet, TAKE 1 TABLET BY MOUTH TWICE DAILY, Disp: 60 tablet, Rfl: 6     UNABLE TO FIND, Take 1 capsule by mouth daily. Med Name: Probiotic with green tea., Disp: , Rfl:      warfarin (COUMADIN) 5 MG tablet, TAKE 1 TABLET(5 MG) BY MOUTH DAILY, Disp: 90 tablet, Rfl: 0     gabapentin (NEURONTIN) 100 MG capsule, TK 1-3 CS  PO HS, Disp: , Rfl: 9     insulin aspart (NOVOLOG FLEXPEN) 100 unit/mL injection pen, Sliding scale QID (meals/bedtime): BG  "141-180, 2 units; -220, 4 u; -260, 6 u; -300, 8 u; -340, 10 u; BG >340, 12 u, Disp: 5 Pre-filled Pen Syringe, Rfl: 0     nicotine (NICODERM CQ) 21 mg/24 hr, APPLY ONE PATCH ON THE SKIN EVERY DAY, Disp: 28 patch, Rfl: 0     predniSONE (DELTASONE) 10 mg tablet, 6 tabs po daily x 2 d, 5 tabs po daily x 2 d, 4 tabs po daily x 2 d,  3 tabs po daily x 2 d,  2 tabs po daily x 2 d,  1 tab po daily x 2 d, Disp: 42 tablet, Rfl: 0    Current Facility-Administered Medications:      lidocaine 10 mg/mL (1 %) injection 10 mL, 10 mL, Intradermal, Once, Cammy Bui MD     lidocaine 10 mg/mL (1 %) injection 10 mL, 10 mL, Intradermal, Q28 Days, Cammy Bui MD, 10 mL at 11/17/17 1149    Allergies:  Allergies   Allergen Reactions     Celebrex [Celecoxib] Rash     patient had butterfly rash - \"lupus-like\"       Latex Rash       ROS:  Pertinent positives as noted in HPI; otherwise 12 point ROS negative.      Physical Exam:  EXAM:  There were no vitals taken for this visit.   Gen:  NAD, appears well, well-hydrated  HEENT:  TMs nl, oropharynx benign, nasal mucosa nl, conjunctiva clear  Neck:  Supple, no adenopathy, no thyromegaly, no carotid bruits, no JVD  Lungs:  Clear to auscultation bilaterally  Cor:  RRR no murmur  Abd:  Soft, nontender, BS+, no masses, no guarding or rebound, no HSM  Extr:  Neg.  Back:  Multiple trigger points in upper back, neck - R>L  Neuro:  No asymmetry  Skin:  Warm/dry        Results:  Results for orders placed or performed in visit on 12/19/17   INR   Result Value Ref Range    INR 1.40 (H) 0.90 - 1.10   Lipid Profile   Result Value Ref Range    Triglycerides 119 <=149 mg/dL    Cholesterol 201 (H) <=199 mg/dL    LDL Calculated 88 <=129 mg/dL    HDL Cholesterol 89 >=50 mg/dL    Patient Fasting > 8hrs? Yes    Glycosylated Hemoglobin A1c   Result Value Ref Range    Hemoglobin A1c 6.0 3.5 - 6.0 %   Comprehensive Metabolic Panel   Result Value Ref Range    Sodium " 145 136 - 145 mmol/L    Potassium 5.2 (H) 3.5 - 5.0 mmol/L    Chloride 104 98 - 107 mmol/L    CO2 21 (L) 22 - 31 mmol/L    Anion Gap, Calculation 20 (H) 5 - 18 mmol/L    Glucose 76 70 - 125 mg/dL    BUN 15 8 - 22 mg/dL    Creatinine 0.63 0.60 - 1.10 mg/dL    GFR MDRD Af Amer >60 >60 mL/min/1.73m2    GFR MDRD Non Af Amer >60 >60 mL/min/1.73m2    Bilirubin, Total 0.5 0.0 - 1.0 mg/dL    Calcium 9.8 8.5 - 10.5 mg/dL    Protein, Total 7.8 6.0 - 8.0 g/dL    Albumin 3.9 3.5 - 5.0 g/dL    Alkaline Phosphatase 103 45 - 120 U/L    AST 33 0 - 40 U/L    ALT 27 0 - 45 U/L       Procedure Note - Trigger Point Injections    Consent obtained: verbal    Indication:  Pain, fibromyalgia    5 trigger points identified.  Each trigger point swabbed x 3 with Betadine swab.  Each trigger point then injected with 2 ml of 1% Lidocaine (no epi).    Total volume injected:  10 ml    Complications:  None, patient tolerated procedure well.    Plan:  Discussed care with patient.  RTC for further injections in 30 days prn.

## 2021-06-14 NOTE — TELEPHONE ENCOUNTER
ANTICOAGULATION  MANAGEMENT    Assessment     Today's INR result of 2.5 is Therapeutic (goal INR of 2.0-3.0)        Warfarin taken as previously instructed    No new diet changes affecting INR    No new medication/supplements affecting INR    Continues to tolerate warfarin with no reported s/s of bleeding or thromboembolism     Previous INR was Therapeutic    Plan:     Spoke on phone with Mary Kay regarding INR result and instructed:     Warfarin Dosing Instructions:  Continue current warfarin dose 5 mg daily     Instructed patient to follow up no later than: 1 month    Education provided: target INR goal and significance of current INR result    Mary Kay verbalizes understanding and agrees to warfarin dosing plan.    Instructed to call the [facility] for any changes, questions or concerns. (#[phone])   ?   Marija Crawley RN    Subjective/Objective:      Mary Kaymarlene Tejada, a 71 y.o. female is on warfarin.     Mary Kay reports:     Home warfarin dose: as updated on anticoagulation calendar per template     Missed doses: No     Medication changes:  No     S/S of bleeding or thromboembolism:  No     New Injury or illness:  No     Changes in diet or alcohol consumption:  No     Upcoming surgery, procedure or cardioversion:  No    Anticoagulation Episode Summary     Current INR goal:  2.0-3.0   TTR:  43.6 % (1 y)   Next INR check:  2/19/2021   INR from last check:  2.50 (1/22/2021)   Weekly max warfarin dose:     Target end date:  Indefinite   INR check location:     Preferred lab:     Send INR reminders to:  [facility]    Indications    Paroxysmal Atrial Fibrillation [I48.91]           Comments:           Anticoagulation Care Providers     Provider Role Specialty Phone number    Cammy Bui MD Referring Family Medicine [phone]

## 2021-06-14 NOTE — PROGRESS NOTES
"ASSESSMENT/PLAN:  1. Vertigo  meclizine (ANTIVERT) 25 mg tablet   2. Fibromyalgia  cyclobenzaprine (FLEXERIL) 10 MG tablet    lidocaine 10 mg/mL (1 %) injection 10 mL   3. Chronic pain syndrome  oxyCODONE (ROXICODONE) 10 mg immediate release tablet       This is a 72 yo female with:  1.  Vertigo - she reports several \"attacks\" recently - has been using her Meclizine regularly - requires a refill again - discussed - no neurologic red flags -   2.  Fibromyalgia/chronic pain syndrome - she comes in regularly, monthly, for trigger point injections - we are now injecting both left and right upper back - spreading 10 ml of Lidocaine (no epi) across 6 trigger points.  Tolerating well.  This allows us to not increase her opiate load.  Due for refill as well today.  Sent to pharmacy.     Return in about 1 month (around 2/22/2021) for Recheck.  Administrations This Visit     lidocaine 10 mg/mL (1 %) injection 10 mL     Admin Date  01/22/2021 Action  Given Dose  10 mL Route  Other Administered By  Ana Patel CMA                Medications Discontinued During This Encounter   Medication Reason     meclizine (ANTIVERT) 25 mg tablet Reorder     cyclobenzaprine (FLEXERIL) 10 MG tablet Reorder     oxyCODONE (ROXICODONE) 10 mg immediate release tablet Reorder     There are no Patient Instructions on file for this visit.    Chief Complaint:  Chief Complaint   Patient presents with     Fibromyalgia shot     Dizziness       HPI:   Mary Kay Tejada is a 71 y.o. female c/o  Patient complains of   1.  Vertigo - recent - several episodes - using her Meclizine - that helps  No local weakness  No other numbness/tingling  2.  Fibromyalgia - has h/o pain syndrome -   Does well with trigger point injections -       PMH:   Patient Active Problem List    Diagnosis Date Noted     Chronic gastric ulcer without hemorrhage and without perforation 10/19/2020     Advanced directives, counseling/discussion 08/17/2020     Primary osteoarthritis of both " knees 01/17/2020     Mixed stress and urge urinary incontinence 08/16/2019     Skin lesion 07/17/2019     Chronic a-fib (H)      Dyslipidemia      Upper GI bleed 12/13/2018     Melena 12/13/2018     Anemia due to blood loss, acute 07/18/2018     Diabetic polyneuropathy associated with type 2 diabetes mellitus (H) 07/18/2018     Chronic anemia 06/27/2018     Cataract 11/06/2017     Bunion, left 07/19/2017     Callus of foot 07/19/2017     Nonrheumatic aortic valve stenosis 05/23/2017     COPD (chronic obstructive pulmonary disease) (H) 05/23/2017     Chronic obstructive pulmonary disease with acute lower respiratory infection (H) 12/24/2016     Gastroenteritis 12/24/2016     Syncope 12/22/2016     Opacity of lung on imaging study 12/22/2016     Acute gastritis 12/22/2016     Osteoarthritis of both knees 08/22/2016     Osteoarthritis, hip, bilateral 06/20/2016     Primary osteoarthritis of left knee 06/20/2016     Candidal intertrigo 05/11/2016     Type 2 diabetes mellitus with hypoglycemia (H)      Aspiration pneumonia (H) 03/01/2016     Controlled substance agreement signed 11/23/2015     Cervical neck pain with evidence of disc disease 07/22/2015     Headache 07/17/2015     Hematoma of abdominal wall 07/05/2015     Skin lesion of face 05/22/2015     Tendonitis of wrist, right 04/22/2015     Menopause 04/06/2015     Flashers or floaters of right eye 02/23/2015     Tendonitis of wrist, left 01/21/2015     Trigger point of thoracic region 01/21/2015     Generalized osteoarthrosis, involving multiple sites 01/14/2015     Vertigo 12/17/2014     Rotator cuff tendonitis 12/17/2014     Abnormal Weight Loss      Osteoarthritis Of The Shoulder      Chronic Constipation      Onychomycosis Of The Toenails      Diarrhea      Ganglion Of The Left Wrist      Larynx Edema      Obesity      Medial Epicondylitis      Skin Lesion      Urinary Incontinence      Chronic Major Depression      Dry Eye Syndrome      Nicotine Dependence       Hypokalemia      Essential hypertension      Muscle Cramps In The Calf      Edema      Atrial flutter (H)      Lump In / On The Skin      Chronic pain syndrome      Paroxysmal Atrial Fibrillation      Fibromyalgia      Nausea      Abdominal Pain      Peptic Ulcer      COPD exacerbation (H)      Mixed hyperlipidemia      Chronic Reflux Esophagitis      Benign Adenomatous Polyp Of The Large Intestine      Past Medical History:   Diagnosis Date     Anemia      Aortic stenosis      Atrial fibrillation (H)      Atrial flutter (H)      Benign neoplasm of adenomatous polyp     large intestine      Chronic constipation      Chronic pain syndrome      COPD (chronic obstructive pulmonary disease) (H)     Oxygen at night      Dependence on supplemental oxygen     Oxygen at noc, during the day as needed     Depression      Diabetes mellitus (H)      Dry eye syndrome      Fibromyalgia      Ganglion     left wrist     GERD (gastroesophageal reflux disease)      Hyperlipidemia      Hypertension      Hypokalemia      Larynx edema      Lung disease      Malignant Vulvar Neoplasm     Created by Conversion [facility] Annotation: Apr 17 2007  8:24AM - Cammy Bui:  resection per Dr. Alfonso Mane 9/06;  Replacement Utility updated for latest IMO load     Medial epicondylitis      Onychomycosis      Osteoarthritis      Otitis Externa     Created by Conversion      Peptic ulcer      Polyneuropathy      Vulvar malignant neoplasm (H)      Past Surgical History:   Procedure Laterality Date     BREAST BIOPSY Right      BREAST BIOPSY Right 01/28/2015     BREAST BIOPSY Right 1/28/2015    Procedure: RIGHT BREAST BIOPSY AFTER WIRE LOCALIZATION AT 0940;  Surgeon: Renée Soriano MD;  Location: [facility] [place];  Service:      COLONOSCOPY N/A 6/14/2019    Procedure: COLONOSCOPY;  Surgeon: Eduardo Mora MD;  Location: [facility] [place];  Service: Gastroenterology     ESOPHAGOGASTRODUODENOSCOPY N/A 11/6/2018    Procedure:  ESOPHAGOGASTRODUODENOSCOPY;  Surgeon: Lit Fernando MD;  Location: [facility] OR;  Service:      HYSTERECTOMY       JOINT REPLACEMENT Left     TKA     OK ABLATE HEART DYSRHYTHM FOCUS      Description: Catheter Ablation Atrial Fibrillation;  Recorded: 07/31/2012;  Comments: 7/24/12 PVI with Dr. Gardiner and nilay to all 5 pulm veins and CTI fl ablation line as well.     OK BIOPSY OF BREAST, INCISIONAL      Description: Incisional Breast Biopsy;  Recorded: 11/13/2007;  Comments: benign     OK SUPRACERV ABD HYSTERECTOMY      Description: Supracervical Hysterectomy;  Proc Date: 01/01/1985;  Comments: some cervix left!; ovaries intact; done for bleeding     Social History     Socioeconomic History     Marital status:      Spouse name: Not on file     Number of children: Not on file     Years of education: Not on file     Highest education level: Not on file   Occupational History     Not on file   Social Needs     Financial resource strain: Not on file     Food insecurity     Worry: Not on file     Inability: Not on file     Transportation needs     Medical: Not on file     Non-medical: Not on file   Tobacco Use     Smoking status: Current Every Day Smoker     Packs/day: 0.50     Types: Cigarettes     Smokeless tobacco: Never Used   Substance and Sexual Activity     Alcohol use: Yes     Comment: very little     Drug use: No     Sexual activity: Not on file   Lifestyle     Physical activity     Days per week: Not on file     Minutes per session: Not on file     Stress: Not on file   Relationships     Social connections     Talks on phone: Not on file     Gets together: Not on file     Attends [employer] service: Not on file     Active member of club or organization: Not on file     Attends meetings of clubs or organizations: Not on file     Relationship status: Not on file     Intimate partner violence     Fear of current or ex partner: Not on file     Emotionally abused: Not on file     Physically abused:  "Not on file     Forced sexual activity: Not on file   Other Topics Concern     Not on file   Social History Narrative     Not on file     Family History   Problem Relation Age of Onset     Heart failure Mother      Cancer Other         paternal HX-laryngeal      Alcoholism Sister      No Medical Problems Daughter      No Medical Problems Maternal Grandmother      No Medical Problems Maternal Grandfather      No Medical Problems Paternal Grandmother      No Medical Problems Paternal Grandfather      No Medical Problems Maternal Aunt      No Medical Problems Paternal Aunt      Alcoholism Sister      Alcoholism Brother      Alcohol abuse Father      Cancer Paternal Uncle         Gastric-Alcohol     Cancer Paternal Uncle         gastric-Alcohol     BRCA 1/2 Neg Hx      Breast cancer Neg Hx      Colon cancer Neg Hx      Endometrial cancer Neg Hx      Ovarian cancer Neg Hx        Meds:    Current Outpatient Medications:      ACCU-CHEK SOFTCLIX LANCETS lancets, TEST THREE TIMES DAILY, Disp: 300 each, Rfl: 3     albuterol (PROAIR HFA;PROVENTIL HFA;VENTOLIN HFA) 90 mcg/actuation inhaler, INHALE 2 PUFFS EVERY 4 HOURS AS NEEDED WHEEZING, Disp: 90 g, Rfl: 11     albuterol (PROVENTIL) 2.5 mg /3 mL (0.083 %) nebulizer solution, Take 3 mL (2.5 mg total) by nebulization every 4 (four) hours as needed for wheezing or shortness of breath., Disp: 75 mL, Rfl: 12     atorvastatin (LIPITOR) 20 MG tablet, TAKE 1 TABLET(20 MG) BY MOUTH AT BEDTIME, Disp: 90 tablet, Rfl: 3     BD ULTRA-FINE SHORT PEN NEEDLE 31 gauge x 5/16\" Ndle, TEST FOUR TIMES DAILY WITH MEALS AND AT BEDTIME, Disp: 400 each, Rfl: 3     blood-glucose meter (ONETOUCH VERIO IQ METER) Misc, Check blood sugar three times a day., Disp: 1 each, Rfl: 0     budesonide-formoteroL (SYMBICORT) 160-4.5 mcg/actuation inhaler, Inhale 2 puffs 2 (two) times a day. Inhale 2 puff by mouth twice daily, Disp: 1 Inhaler, Rfl: 2     diaper,brief,adult,disposable (ADULT BRIEFS - LARGE) Misc, Use " 3-4 daily as needed for incontinence, Disp: 120 each, Rfl: 6     diltiazem (CARDIZEM CD) 120 MG 24 hr capsule, TAKE ONE CAPSULE BY MOUTH DAILY, Disp: 90 capsule, Rfl: 1     famotidine (PEPCID) 20 MG tablet, Take 1 tablet (20 mg total) by mouth 2 (two) times a day., Disp: 180 tablet, Rfl: 3     furosemide (LASIX) 20 MG tablet, TAKE 1 TABLET(20 MG) BY MOUTH DAILY AS NEEDED, Disp: 90 tablet, Rfl: 3     gabapentin (NEURONTIN) 600 MG tablet, TAKE 1 TABLET(600 MG) BY MOUTH THREE TIMES DAILY, Disp: 270 tablet, Rfl: 3     generic lancets (FINGERSTIX LANCETS), Dispense brand per patient's insurance at pharmacy discretion., Disp: 300 each, Rfl: 0     ipratropium-albuterol (DUO-NEB) 0.5-2.5 mg/3 mL nebulizer, INAHALE 1 VIAL IN NEBULIZER EVERY 4 HOURS AS NEEDED, Disp: 1440 mL, Rfl: 0     metFORMIN (GLUCOPHAGE) 500 MG tablet, TAKE 1 TABLET(500 MG) BY MOUTH TWICE DAILY WITH MEALS, Disp: 180 tablet, Rfl: 3     mometasone-formoterol (DULERA) 200-5 mcg/actuation HFAA inhaler, Inhale 2 puffs 2 (two) times a day., Disp: 1 Inhaler, Rfl: 5     nicotine (NICODERM CQ) 21 mg/24 hr, Place 1 patch on the skin daily., Disp: 30 patch, Rfl: 1     nystatin (NYSTOP) powder, Apply 1 application topically 2 (two) times a day as needed. 2-3 times to affected area(s)., Disp: 60 g, Rfl: 3     omeprazole (PRILOSEC) 20 MG capsule, TAKE 1 CAPSULE(20 MG) BY MOUTH DAILY BEFORE BREAKFAST, Disp: 30 capsule, Rfl: 3     ONETOUCH VERIO TEST STRIPS strips, USE 1 EACH AS DIRECTED THREE TIMES DAILY AS NEEDED, Disp: 300 strip, Rfl: 3     oxyCODONE (ROXICODONE) 10 mg immediate release tablet, Take 1 tablet (10 mg total) by mouth every 6 (six) hours as needed., Disp: 120 tablet, Rfl: 0     polyethylene glycol (MIRALAX) 17 gram packet, Take 1 packet (17 g total) by mouth daily. (Patient taking differently: Take 17 g by mouth daily as needed.    ), Disp: , Rfl: 0     sucralfate (CARAFATE) 1 gram tablet, TAKE 1 TABLET BY MOUTH FOUR TIMES DAILY(CRUSH TABLET AND MIX IT  "WITH A LITTLE WATER, THEN SWALLOW), Disp: 120 tablet, Rfl: 12     warfarin ANTICOAGULANT (COUMADIN/JANTOVEN) 5 MG tablet, TAKE 1/2 TO 1 TABLET BY MOUTH DAILY AS DIRECTED, Disp: 90 tablet, Rfl: 1     cyclobenzaprine (FLEXERIL) 10 MG tablet, TAKE 1 TABLET(10 MG) BY MOUTH AT BEDTIME AS NEEDED FOR MUSCLE SPASMS, Disp: 30 tablet, Rfl: 0     meclizine (ANTIVERT) 25 mg tablet, Take 1 tablet (25 mg total) by mouth 3 (three) times a day as needed., Disp: 45 tablet, Rfl: 5    Allergies:  Allergies   Allergen Reactions     Celebrex [Celecoxib] Rash     patient had butterfly rash - \"lupus-like\"       Latex Rash       ROS:  Pertinent positives as noted in HPI; otherwise 12 point ROS negative.      Physical Exam:  EXAM:  /71 (Patient Site: Right Arm, Patient Position: Sitting, Cuff Size: Adult Regular)   Pulse 74   Resp 16   Wt 149 lb (67.6 kg)   BMI 25.98 kg/m     Gen:  NAD, appears chronically ill, well-hydrated  HEENT:  TMs nl, oropharynx benign, nasal mucosa nl, conjunctiva clear  Neck:  Supple, no adenopathy, no thyromegaly, no carotid bruits, no JVD  Lungs:  Clear to auscultation bilaterally  Cor:  RRR no murmur  Abd:  Soft, nontender, BS+, no masses, no guarding or rebound, no HSM  Extr:  Neg.  Back:  Tender to palpation at upper back - trigger points  Neuro:  No asymmetry  Skin:  Warm/dry        Results:  Results for orders placed or performed in visit on 12/21/20   INR   Result Value Ref Range    INR 2.80 (H) 0.90 - 1.10       Procedure Note - Trigger Point Injections    Consent obtained: verbal    Indication:  Fibromyalgia, chronic pain syndrome    6 trigger points identified.  Each trigger point swabbed x 3 with Betadine swab.  Each trigger point then injected with 1.6 ml of 1% Lidocaine (no epi).    Total volume injected:  10 ml    Complications:  None, patient tolerated procedure well.    Plan:  Discussed care with patient.  RTC for further injections in 30 days prn.          "

## 2021-06-14 NOTE — PROGRESS NOTES
"ASSESSMENT/PLAN:  1. Shoulder pain  lidocaine 10 mg/mL (1 %) injection 10 mL   2. Paroxysmal atrial fibrillation  INR   3. Chronic pain syndrome  oxyCODONE (ROXICODONE) 10 mg immediate release tablet   4. Fibromyalgia         This is a 60 female, with known history of chronic fibromyalgia with chronic pain syndrome.  She has been treated with trigger point injections on multiple occasions, coming in nearly monthly for these injections.  She has benefited from this, and this is helped to decrease the amount of opiate prescriptions that we write.  She continues to use the opiates without any increase in the amount or frequency of her refills.  Please refer the procedure note below for details of the trigger point injections.  As usual, have found 5 trigger points to inject, he doing to on the left upper back, and 2 on the right upper back.  These were injected without difficulty with lidocaine.    Again we have refilled her oxycodone therapy for her chronic pain syndrome, she is unable to use any anti-inflammatory medications due to her anticoagulation use for her atrial fibrillation.  An INR will be obtained today, and she should hear from our anticoagulation team regarding future dosing.    Administrations This Visit     lidocaine 10 mg/mL (1 %) injection 10 mL     Admin Date Action Dose Route Administered By             11/17/2017 Given 10 mL Intradermal Emma Brown CMA                          Medications Discontinued During This Encounter   Medication Reason     oxyCODONE (ROXICODONE) 10 mg immediate release tablet Reorder     There are no Patient Instructions on file for this visit.    Chief Complaint:  Chief Complaint   Patient presents with     trigger points shots       HPI:   Mary Kay Tejada is a 67 y.o. female c/o  Pain in upper back - \"it's really bad this month - both sides\"  \"how many injections can I get? - how often?\"      PMH:   Patient Active Problem List    Diagnosis Date Noted     Cataract " 11/06/2017     Bunion 07/19/2017     Callus of foot 07/19/2017     Moderate aortic stenosis 05/23/2017     COPD (chronic obstructive pulmonary disease) 05/23/2017     DM neuropathy, type II diabetes mellitus 05/19/2017     Chronic obstructive pulmonary disease with acute lower respiratory infection 12/24/2016     Gastroenteritis 12/24/2016     Syncope 12/22/2016     Opacity of lung on imaging study 12/22/2016     Acute gastritis 12/22/2016     Osteoarthritis of both knees 08/22/2016     Osteoarthritis, hip, bilateral 06/20/2016     Primary osteoarthritis of left knee 06/20/2016     Candidal intertrigo 05/11/2016     Type 2 diabetes mellitus with hypoglycemia      Aspiration pneumonia 03/01/2016     Controlled substance agreement signed 11/23/2015     Cervical neck pain with evidence of disc disease 07/22/2015     Headache 07/17/2015     Hematoma of abdominal wall 07/05/2015     Skin lesion of face 05/22/2015     Tendonitis of wrist, right 04/22/2015     Menopause 04/06/2015     Flashers or floaters of right eye 02/23/2015     Tendonitis of wrist, left 01/21/2015     Trigger point of thoracic region 01/21/2015     Generalized osteoarthrosis, involving multiple sites 01/14/2015     Vertigo 12/17/2014     Rotator cuff tendonitis 12/17/2014     Abnormal Weight Loss      Osteoarthritis Of The Shoulder      Chronic Constipation      Onychomycosis Of The Toenails      Diarrhea      Ganglion Of The Left Wrist      Larynx Edema      Obesity      Malignant Vulvar Neoplasm      Medial Epicondylitis      Skin Lesion      Urinary Incontinence      Chronic Major Depression      Dry Eye Syndrome      Nicotine Dependence      Hypokalemia      Essential hypertension      Muscle Cramps In The Calf      Edema      Atrial flutter      Lump In / On The Skin      Type 2 diabetes mellitus with hyperglycemia      Chronic pain syndrome      Paroxysmal Atrial Fibrillation      Fibromyalgia      Nausea      Abdominal Pain      Peptic Ulcer       COPD exacerbation      Mixed hyperlipidemia      Chronic Reflux Esophagitis      Benign Adenomatous Polyp Of The Large Intestine      Past Medical History:   Diagnosis Date     Aortic stenosis      Atrial fibrillation      Atrial flutter      Benign neoplasm of adenomatous polyp     large intestine      Chronic constipation      Chronic pain syndrome      COPD (chronic obstructive pulmonary disease)     Oxygen at night      Depression      Diabetes mellitus      Dry eye syndrome      Fibromyalgia      Ganglion     left wrist     GERD (gastroesophageal reflux disease)      Hyperlipidemia      Hypertension      Hypokalemia      Larynx edema      Lung disease      Medial epicondylitis      Onychomycosis      Osteoarthritis      Otitis Externa     Created by Conversion      Peptic ulcer      Type 2 diabetes mellitus      Vulvar malignant neoplasm      Past Surgical History:   Procedure Laterality Date     BREAST BIOPSY Right      BREAST BIOPSY Right 01/28/2015     BREAST BIOPSY Right 1/28/2015    Procedure: RIGHT BREAST BIOPSY AFTER WIRE LOCALIZATION AT 0940;  Surgeon: Renée Soriano MD;  Location: [facility] [place];  Service:      HYSTERECTOMY       [place] ABLATE HEART DYSRHYTHM FOCUS      Description: Catheter Ablation Atrial Fibrillation;  Recorded: 07/31/2012;  Comments: 7/24/12 PVI with Dr. Gardiner and nilay to all 5 pulm veins and CTI fl ablation line as well.     [place] BIOPSY OF BREAST, INCISIONAL      Description: Incisional Breast Biopsy;  Recorded: 11/13/2007;  Comments: benign     [place] SUPRACERV ABD HYSTERECTOMY      Description: Supracervical Hysterectomy;  Proc Date: 01/01/1985;  Comments: some cervix left!; ovaries intact; done for bleeding     WV TOTAL ABDOM HYSTERECTOMY      Description: Total Abdominal Hysterectomy;  Recorded: 12/31/2013;     [place] TOTAL KNEE ARTHROPLASTY      Description: Total Knee Arthroplasty;  Recorded: 11/13/2007;     Social History     Social History     Marital status:       "Spouse name: N/A     Number of children: N/A     Years of education: N/A     Occupational History     Not on file.     Social History Main Topics     Smoking status: Light Tobacco Smoker     Types: Cigarettes     Last attempt to quit: 1/1/2014     Smokeless tobacco: Never Used      Comment: E-cig some day     Alcohol use Yes      Comment: very little     Drug use: No     Sexual activity: Not on file     Other Topics Concern     Not on file     Social History Narrative       Meds:    Current Outpatient Prescriptions:      albuterol (PROVENTIL) 2.5 mg /3 mL (0.083 %) nebulizer solution, Take 3 mL (2.5 mg total) by nebulization every 4 (four) hours as needed for wheezing or shortness of breath., Disp: 75 mL, Rfl: 12     albuterol (VENTOLIN HFA) 90 mcg/actuation inhaler, INHALE 1 TO 2 PUFFS BY MOUTH EVERY 4 TO 6 HOURS AS NEEDED, Disp: 18 g, Rfl: 6     atorvastatin (LIPITOR) 20 MG tablet, TAKE 1 TABLET(20 MG) BY MOUTH AT BEDTIME, Disp: 90 tablet, Rfl: 0     BD INSULIN PEN NEEDLE UF SHORT 31 gauge x 5/16\" Ndle, TEST FOUR TIMES DAILY WITH MEALS AND AT BEDTIME, Disp: 300 each, Rfl: 2     blood glucose meter (GLUCOMETER), Use 1 each As Directed as needed. Dispense glucometer brand per patient's insurance at pharmacy discretion., Disp: 1 each, Rfl: 0     blood glucose test (ACCU-CHEK PEDRO PLUS TEST STRP) strips, Use 1 test strip to test 3 times daily. Dispense brand per patient's insurance at pharmacy discretion., Disp: 100 each, Rfl: 11     budesonide-formoterol (SYMBICORT) 160-4.5 mcg/actuation inhaler, Inhale 2 puffs 2 (two) times a day., Disp: 1 Inhaler, Rfl: 2     CARTIA  mg 24 hr capsule, TAKE ONE CAPSULE BY MOUTH DAILY, Disp: 90 capsule, Rfl: 1     codeine-guaiFENesin (GUAIFENESIN AC)  mg/5 mL liquid, Take 5 mL by mouth 2 (two) times a day as needed for cough., Disp: 240 mL, Rfl: 0     DAILY-FAYE tablet, TAKE 1 TABLET BY MOUTH EVERY DAY, Disp: 90 tablet, Rfl: 3     estradiol (ESTRACE) 2 MG tablet, TAKE 1 " TABLET BY MOUTH EVERY DAY, Disp: 90 tablet, Rfl: 0     furosemide (LASIX) 20 MG tablet, Take 1 tablet (20 mg total) by mouth daily., Disp: 90 tablet, Rfl: 0     gabapentin (NEURONTIN) 100 MG capsule, TK 1-3 CS  PO HS, Disp: , Rfl: 9     generic lancets, Use 1 each As Directed 3 (three) times a day. Accu-Chek Soft Touch, Disp: 100 each, Rfl: 11     insulin aspart (NOVOLOG FLEXPEN) 100 unit/mL injection pen, Sliding scale QID (meals/bedtime): -180, 2 units; -220, 4 u; -260, 6 u; -300, 8 u; -340, 10 u; BG >340, 12 u, Disp: 5 Pre-filled Pen Syringe, Rfl: 0     ipratropium-albuterol (DUO-NEB) 0.5-2.5 mg/3 mL nebulizer, INHALE 1 VIAL IN NEBULIZER EVERY 4 HOURS AS NEEDED, Disp: 1440 mL, Rfl: 0     leg brace (ELASTIC KNEE SUPPORT) Misc, Use 1 each As Directed daily., Disp: , Rfl: 0     loperamide (IMODIUM) 2 mg capsule, Take 2 capsules by mouth initially followed by 1 capsule after each loose stool bowel movement. Do not exceed 8 capsules per day (Patient taking differently: Take 2-4 mg by mouth see administration instructions. Take 2 capsules by mouth initially followed by 1 capsule after each loose stool bowel movement. Do not exceed 8 capsules per day), Disp: 30 capsule, Rfl: 1     meclizine (ANTIVERT) 25 mg tablet, Take 1 tablet (25 mg total) by mouth 3 (three) times a day as needed for dizziness or nausea., Disp: 30 tablet, Rfl: 3     metFORMIN (GLUCOPHAGE) 500 MG tablet, Take 1 tablet (500 mg total) by mouth 2 (two) times a day with meals., Disp: 60 tablet, Rfl: 6     MULTIVITAMIN WITH MINERALS (HAIR,SKIN AND NAILS ORAL), Take 3 tablets by mouth daily. Chewable formulation., Disp: , Rfl:      NEBULIZER/COMPRESSOR (NEBULIZER AND COMPRESSOR MISC), Use As Directed. Use UTD, Disp: , Rfl:      nicotine (NICODERM CQ) 21 mg/24 hr, APPLY ONE PATCH ON THE SKIN EVERY DAY, Disp: 28 patch, Rfl: 0     nystatin (NYSTOP) powder, Apply 1 application topically 2 (two) times a day as needed. 2-3 times to  "affected area(s)., Disp: 15 g, Rfl: 3     olopatadine (PATANOL) 0.1 % ophthalmic solution, Administer 1 drop to both eyes 2 (two) times a day as needed for allergies. , Disp: , Rfl:      omeprazole (PRILOSEC) 20 MG capsule, Take 1 capsule (20 mg total) by mouth 2 (two) times a day before meals., Disp: 60 capsule, Rfl: 6     oxyCODONE (ROXICODONE) 10 mg immediate release tablet, Take 1 tablet (10 mg total) by mouth every 6 (six) hours as needed., Disp: 120 tablet, Rfl: 0     polymyxin B-trimethoprim (FOR POLYTRIM) 10,000 unit- 1 mg/mL Drop ophthalmic drops, 1 drop in affected eye q 3 hours while awake, Disp: 10 mL, Rfl: 0     predniSONE (DELTASONE) 10 mg tablet, 6 tabs po daily x 2 d, 5 tabs po daily x 2 d, 4 tabs po daily x 2 d,  3 tabs po daily x 2 d,  2 tabs po daily x 2 d,  1 tab po daily x 2 d, Disp: 42 tablet, Rfl: 0     ranitidine (ZANTAC) 150 MG tablet, TAKE 1 TABLET BY MOUTH TWICE DAILY, Disp: 60 tablet, Rfl: 6     senna-docusate (PERICOLACE) 8.6-50 mg tablet, Take 2 tablets by mouth daily as needed for constipation., Disp: 30 tablet, Rfl: 1     UNABLE TO FIND, Take 1 capsule by mouth daily. Med Name: Probiotic with green tea., Disp: , Rfl:      warfarin (COUMADIN) 5 MG tablet, TAKE 1 TABLET(5 MG) BY MOUTH DAILY, Disp: 90 tablet, Rfl: 0    Current Facility-Administered Medications:      lidocaine 10 mg/mL (1 %) injection 10 mL, 10 mL, Intradermal, Once, Cammy Bui MD     lidocaine 10 mg/mL (1 %) injection 10 mL, 10 mL, Intradermal, Q28 Days, Cammy Bui MD, 10 mL at 11/17/17 1149    Allergies:  Allergies   Allergen Reactions     Celebrex [Celecoxib] Rash     patient had butterfly rash - \"lupus-like\"       Latex Rash       ROS:  Pertinent positives as noted in HPI; otherwise 12 point ROS negative.      Physical Exam:  EXAM:  /70 (Patient Site: Left Arm, Patient Position: Sitting, Cuff Size: Adult Regular)  Pulse 80  Resp 16  Ht 5' 4\" (1.626 m)  Wt 199 lb (90.3 kg)  " BMI 34.16 kg/m2   Gen:  NAD, appears well, well-hydrated  HEENT:  TMs nl, oropharynx benign, nasal mucosa nl, conjunctiva clear  Neck:  Supple, no adenopathy, no thyromegaly, no carotid bruits, no JVD  Lungs:  Clear to auscultation bilaterally  Cor:  RRR no murmur  Abd:  Soft, nontender, BS+, no masses, no guarding or rebound, no HSM  Back:  Trigger points in upper back/posterior shoulders/lower neck  Extr:  DJD - knees with decreased ROM knees  Neuro:  No asymmetry  Skin:  Warm/dry        Results:  Results for orders placed or performed in visit on 11/17/17   INR   Result Value Ref Range    INR 1.70 (H) 0.90 - 1.10       Procedure Note - Trigger Point Injections    Consent obtained: verbal    Indication:  Pain, fibromyalgia    5 trigger points identified.  Each trigger point swabbed x 3 with Betadine swab.  Each trigger point then injected with 2 ml of 1% Lidocaine (no epi).    Total volume injected:  10 ml    Complications:  None, patient tolerated procedure well.    Plan:  Discussed care with patient.  RTC for further injections in 30 days prn.

## 2021-06-14 NOTE — TELEPHONE ENCOUNTER
----- Message from CAMILO Wilson sent at 1/19/2021  8:40 AM CST -----  Regarding: CAMACHO PATIENT  General phone call:    Caller: Patient    Primary cardiologist: CAMACHO    Detailed reason for call: patient would like a call back from nurse, she states she thinks [facility] wanted her to have an Echo, I dont see an order for that. Please call patient and advise.    Best phone number: [phone]    Best time to contact: any    Ok to leave a detailed message? Yes    Device? no    Additional Info:        Dr. Pizano, Pt overdo to see you. Last office visit with patient was 9/2019. You recommended an echo and follow-up in one year. Ok to re-order echo and have done prior to follow-up visit with you or would you like to see patient first?

## 2021-06-14 NOTE — TELEPHONE ENCOUNTER
RN cannot approve Refill Request    RN can NOT refill this medication med is not covered by policy/route to provider. Last office visit: 12/21/2020 Cammy Bui MD Last Physical: 8/17/2020 Last MTM visit: Visit date not found Last visit same specialty: 12/21/2020 Cammy Bui MD.  Next visit within 3 mo: Visit date not found  Next physical within 3 mo: Visit date not found      Floridalma Chinchilla, [facility] Triage/Med Refill 1/3/2021    Requested Prescriptions   Pending Prescriptions Disp Refills     omeprazole (PRILOSEC) 20 MG capsule [Pharmacy Med Name: OMEPRAZOLE 20MG CAPSULES] 30 capsule 3     Sig: TAKE 1 CAPSULE(20 MG) BY MOUTH DAILY BEFORE BREAKFAST       GI Medications Refill Protocol Passed - 1/2/2021  1:42 PM        Passed - PCP or prescribing provider visit in last 12 or next 3 months.     Last office visit with prescriber/PCP: 12/21/2020 Cammy Bui MD OR same dept: 12/21/2020 Cammy Bui MD OR same specialty: 12/21/2020 Cammy Bui MD  Last physical: 8/17/2020 Last MTM visit: Visit date not found   Next visit within 3 mo: Visit date not found  Next physical within 3 mo: Visit date not found  Prescriber OR PCP: Cammy Bui MD  Last diagnosis associated with med order: 1. Peptic ulcer  - omeprazole (PRILOSEC) 20 MG capsule [Pharmacy Med Name: OMEPRAZOLE 20MG CAPSULES]; TAKE 1 CAPSULE(20 MG) BY MOUTH DAILY BEFORE BREAKFAST  Dispense: 30 capsule; Refill: 3    2. Fibromyalgia  - cyclobenzaprine (FLEXERIL) 10 MG tablet [Pharmacy Med Name: CYCLOBENZAPRINE 10MG TABLETS]; TAKE 1 TABLET(10 MG) BY MOUTH AT BEDTIME AS NEEDED FOR MUSCLE SPASMS  Dispense: 30 tablet; Refill: 0    If protocol passes may refill for 12 months if within 3 months of last provider visit (or a total of 15 months).                cyclobenzaprine (FLEXERIL) 10 MG tablet [Pharmacy Med Name: CYCLOBENZAPRINE 10MG TABLETS] 30 tablet 0     Sig:  TAKE 1 TABLET(10 MG) BY MOUTH AT BEDTIME AS NEEDED FOR MUSCLE SPASMS       There is no refill protocol information for this order

## 2021-06-14 NOTE — TELEPHONE ENCOUNTER
Called pt and informed her [facility] would like echo and then a follow-up appointment. Pt agreeable to plan. Msg sent back to scheduling team. -kcl

## 2021-06-15 ENCOUNTER — COMMUNICATION - HEALTHEAST (OUTPATIENT)
Dept: FAMILY MEDICINE | Facility: CLINIC | Age: 71
End: 2021-06-15

## 2021-06-15 DIAGNOSIS — J44.1 COPD EXACERBATION (H): ICD-10-CM

## 2021-06-15 PROBLEM — J44.9 COPD (CHRONIC OBSTRUCTIVE PULMONARY DISEASE) (H): Status: ACTIVE | Noted: 2017-05-23

## 2021-06-15 RX ORDER — BUDESONIDE AND FORMOTEROL FUMARATE DIHYDRATE 160; 4.5 UG/1; UG/1
2 AEROSOL RESPIRATORY (INHALATION) 2 TIMES DAILY
Qty: 1 INHALER | Refills: 5 | Status: SHIPPED | OUTPATIENT
Start: 2021-06-15 | End: 2022-02-14

## 2021-06-15 NOTE — PROGRESS NOTES
ASSESSMENT/PLAN:  1. Fibromyalgia     2. Trigger point of thoracic region     3. Atrial fibrillation  INR   4. Chronic pain syndrome  oxyCODONE (ROXICODONE) 10 mg immediate release tablet       This is a 69 yo female with trigger point pain related to fibromyalgia - comes in monthly for trigger point injections - this at least has stabilized her need for additional pain management, including no new increase in narcotic use.  See procedure note below.    Also - due for monthly renewal of Oxycodone - this was refilled.    Lastly - has chronic atrial fib  - takes warfarin - due for INR - followed by anticoagulation team.        Medications Discontinued During This Encounter   Medication Reason     oxyCODONE (ROXICODONE) 10 mg immediate release tablet Reorder     There are no Patient Instructions on file for this visit.    Chief Complaint:  Chief Complaint   Patient presents with     Injections     Trigger point injection       HPI:   Mary Kay Tejada is a 68 y.o. female c/o  Here for trigger point injections    PMH:   Patient Active Problem List    Diagnosis Date Noted     Cataract 11/06/2017     Bunion 07/19/2017     Callus of foot 07/19/2017     Moderate aortic stenosis 05/23/2017     COPD (chronic obstructive pulmonary disease) 05/23/2017     DM neuropathy, type II diabetes mellitus 05/19/2017     Chronic obstructive pulmonary disease with acute lower respiratory infection 12/24/2016     Gastroenteritis 12/24/2016     Syncope 12/22/2016     Opacity of lung on imaging study 12/22/2016     Acute gastritis 12/22/2016     Osteoarthritis of both knees 08/22/2016     Osteoarthritis, hip, bilateral 06/20/2016     Primary osteoarthritis of left knee 06/20/2016     Candidal intertrigo 05/11/2016     Type 2 diabetes mellitus with hypoglycemia      Aspiration pneumonia 03/01/2016     Controlled substance agreement signed 11/23/2015     Cervical neck pain with evidence of disc disease 07/22/2015     Headache 07/17/2015      Hematoma of abdominal wall 07/05/2015     Skin lesion of face 05/22/2015     Tendonitis of wrist, right 04/22/2015     Menopause 04/06/2015     Flashers or floaters of right eye 02/23/2015     Tendonitis of wrist, left 01/21/2015     Trigger point of thoracic region 01/21/2015     Generalized osteoarthrosis, involving multiple sites 01/14/2015     Vertigo 12/17/2014     Rotator cuff tendonitis 12/17/2014     Abnormal Weight Loss      Osteoarthritis Of The Shoulder      Chronic Constipation      Onychomycosis Of The Toenails      Diarrhea      Ganglion Of The Left Wrist      Larynx Edema      Obesity      Malignant Vulvar Neoplasm      Medial Epicondylitis      Skin Lesion      Urinary Incontinence      Chronic Major Depression      Dry Eye Syndrome      Nicotine Dependence      Hypokalemia      Essential hypertension      Muscle Cramps In The Calf      Edema      Atrial flutter      Lump In / On The Skin      Type 2 diabetes mellitus with hyperglycemia      Chronic pain syndrome      Paroxysmal Atrial Fibrillation      Fibromyalgia      Nausea      Abdominal Pain      Peptic Ulcer      COPD exacerbation      Mixed hyperlipidemia      Chronic Reflux Esophagitis      Benign Adenomatous Polyp Of The Large Intestine      Past Medical History:   Diagnosis Date     Aortic stenosis      Atrial fibrillation      Atrial flutter      Benign neoplasm of adenomatous polyp     large intestine      Chronic constipation      Chronic pain syndrome      COPD (chronic obstructive pulmonary disease)     Oxygen at night      Depression      Diabetes mellitus      Dry eye syndrome      Fibromyalgia      Ganglion     left wrist     GERD (gastroesophageal reflux disease)      Hyperlipidemia      Hypertension      Hypokalemia      Larynx edema      Lung disease      Medial epicondylitis      Onychomycosis      Osteoarthritis      Otitis Externa     Created by Conversion      Peptic ulcer      Type 2 diabetes mellitus      Vulvar malignant  neoplasm      Past Surgical History:   Procedure Laterality Date     BREAST BIOPSY Right      BREAST BIOPSY Right 01/28/2015     BREAST BIOPSY Right 1/28/2015    Procedure: RIGHT BREAST BIOPSY AFTER WIRE LOCALIZATION AT 0940;  Surgeon: Renée Soriano MD;  Location: [facility] - [place];  Service:      HYSTERECTOMY       GA ABLATE HEART DYSRHYTHM FOCUS      Description: Catheter Ablation Atrial Fibrillation;  Recorded: 07/31/2012;  Comments: 7/24/12 PVI with Dr. Gardiner and nilay to all 5 pulm veins and CTI fl ablation line as well.     GA BIOPSY OF BREAST, INCISIONAL      Description: Incisional Breast Biopsy;  Recorded: 11/13/2007;  Comments: benign     GA SUPRACERV ABD HYSTERECTOMY      Description: Supracervical Hysterectomy;  Proc Date: 01/01/1985;  Comments: some cervix left!; ovaries intact; done for bleeding     GA TOTAL ABDOM HYSTERECTOMY      Description: Total Abdominal Hysterectomy;  Recorded: 12/31/2013;     GA TOTAL KNEE ARTHROPLASTY      Description: Total Knee Arthroplasty;  Recorded: 11/13/2007;     Social History     Social History     Marital status:      Spouse name: N/A     Number of children: N/A     Years of education: N/A     Occupational History     Not on file.     Social History Main Topics     Smoking status: Light Tobacco Smoker     Types: Cigarettes     Last attempt to quit: 1/1/2014     Smokeless tobacco: Never Used      Comment: E-cig some day     Alcohol use Yes      Comment: very little     Drug use: No     Sexual activity: Not on file     Other Topics Concern     Not on file     Social History Narrative       Meds:    Current Outpatient Prescriptions:      albuterol (PROVENTIL) 2.5 mg /3 mL (0.083 %) nebulizer solution, Take 3 mL (2.5 mg total) by nebulization every 4 (four) hours as needed for wheezing or shortness of breath., Disp: 75 mL, Rfl: 12     albuterol (VENTOLIN HFA) 90 mcg/actuation inhaler, INHALE 1 TO 2 PUFFS BY MOUTH EVERY 4 TO 6 HOURS AS NEEDED, Disp: 18 g, Rfl:  "6     atorvastatin (LIPITOR) 20 MG tablet, TAKE 1 TABLET(20 MG) BY MOUTH AT BEDTIME, Disp: 90 tablet, Rfl: 0     BD INSULIN PEN NEEDLE UF SHORT 31 gauge x 5/16\" Ndle, TEST FOUR TIMES DAILY WITH MEALS AND AT BEDTIME, Disp: 300 each, Rfl: 2     blood glucose meter (GLUCOMETER), Use 1 each As Directed as needed. Dispense glucometer brand per patient's insurance at pharmacy discretion., Disp: 1 each, Rfl: 0     blood glucose test (ACCU-CHEK PEDRO PLUS TEST STRP) strips, Use 1 test strip to test 3 times daily. Dispense brand per patient's insurance at pharmacy discretion., Disp: 100 each, Rfl: 11     budesonide-formoterol (SYMBICORT) 160-4.5 mcg/actuation inhaler, Inhale 2 puffs 2 (two) times a day., Disp: 1 Inhaler, Rfl: 2     CARTIA  mg 24 hr capsule, TAKE ONE CAPSULE BY MOUTH DAILY, Disp: 90 capsule, Rfl: 1     DAILY-FAYE tablet, TAKE 1 TABLET BY MOUTH EVERY DAY, Disp: 90 tablet, Rfl: 0     furosemide (LASIX) 20 MG tablet, TAKE 1 TABLET(20 MG) BY MOUTH DAILY, Disp: 90 tablet, Rfl: 3     gabapentin (NEURONTIN) 300 MG capsule, , Disp: , Rfl: 6     generic lancets, Use 1 each As Directed 3 (three) times a day. Accu-Chek Soft Touch, Disp: 100 each, Rfl: 11     insulin aspart (NOVOLOG FLEXPEN) 100 unit/mL injection pen, Sliding scale QID (meals/bedtime): -180, 2 units; -220, 4 u; -260, 6 u; -300, 8 u; -340, 10 u; BG >340, 12 u, Disp: 5 Pre-filled Pen Syringe, Rfl: 0     ipratropium-albuterol (DUO-NEB) 0.5-2.5 mg/3 mL nebulizer, INHALE 1 VIAL IN NEBULIZER EVERY 4 HOURS AS NEEDED, Disp: 1440 mL, Rfl: 0     leg brace (ELASTIC KNEE SUPPORT) Misc, Use 1 each As Directed daily., Disp: , Rfl: 0     loperamide (IMODIUM) 2 mg capsule, Take 2 capsules by mouth initially followed by 1 capsule after each loose stool bowel movement. Do not exceed 8 capsules per day (Patient taking differently: Take 2-4 mg by mouth see administration instructions. Take 2 capsules by mouth initially followed by 1 " capsule after each loose stool bowel movement. Do not exceed 8 capsules per day), Disp: 30 capsule, Rfl: 1     meclizine (ANTIVERT) 25 mg tablet, TAKE 1 TABLET(25 MG) BY MOUTH THREE TIMES DAILY AS NEEDED FOR DIZZINESS OR NAUSEA, Disp: 30 tablet, Rfl: 5     metFORMIN (GLUCOPHAGE) 500 MG tablet, Take 1 tablet (500 mg total) by mouth 2 (two) times a day with meals., Disp: 60 tablet, Rfl: 6     MULTIVITAMIN WITH MINERALS (HAIR,SKIN AND NAILS ORAL), Take 3 tablets by mouth daily. Chewable formulation., Disp: , Rfl:      NEBULIZER/COMPRESSOR (NEBULIZER AND COMPRESSOR MISC), Use As Directed. Use UTD, Disp: , Rfl:      nicotine (NICODERM CQ) 21 mg/24 hr, APPLY ONE PATCH ON THE SKIN EVERY DAY, Disp: 28 patch, Rfl: 0     nystatin (NYSTOP) powder, Apply 1 application topically 2 (two) times a day as needed. 2-3 times to affected area(s)., Disp: 15 g, Rfl: 3     olopatadine (PATANOL) 0.1 % ophthalmic solution, Administer 1 drop to both eyes 2 (two) times a day as needed for allergies. , Disp: , Rfl:      omeprazole (PRILOSEC) 20 MG capsule, Take 1 capsule (20 mg total) by mouth 2 (two) times a day before meals., Disp: 60 capsule, Rfl: 6     oxyCODONE (ROXICODONE) 10 mg immediate release tablet, Take 1 tablet (10 mg total) by mouth every 6 (six) hours as needed., Disp: 120 tablet, Rfl: 0     polymyxin B-trimethoprim (FOR POLYTRIM) 10,000 unit- 1 mg/mL Drop ophthalmic drops, 1 drop in affected eye q 3 hours while awake, Disp: 10 mL, Rfl: 0     ranitidine (ZANTAC) 150 MG tablet, TAKE 1 TABLET BY MOUTH TWICE DAILY, Disp: 60 tablet, Rfl: 6     UNABLE TO FIND, Take 1 capsule by mouth daily. Med Name: Probiotic with green tea., Disp: , Rfl:      warfarin (COUMADIN) 5 MG tablet, TAKE 1 TABLET(5 MG) BY MOUTH DAILY, Disp: 90 tablet, Rfl: 0     gabapentin (NEURONTIN) 100 MG capsule, TK 1-3 CS  PO HS, Disp: , Rfl: 9     predniSONE (DELTASONE) 10 mg tablet, 6 tabs po daily x 2 d, 5 tabs po daily x 2 d, 4 tabs po daily x 2 d,  3 tabs po  "daily x 2 d,  2 tabs po daily x 2 d,  1 tab po daily x 2 d, Disp: 42 tablet, Rfl: 0    Current Facility-Administered Medications:      lidocaine 10 mg/mL (1 %) injection 10 mL, 10 mL, Intradermal, Once, Cammy Bui MD     lidocaine 10 mg/mL (1 %) injection 10 mL, 10 mL, Intradermal, Q28 Days, Cammy Bui MD, 10 mL at 11/17/17 1149    Allergies:  Allergies   Allergen Reactions     Celebrex [Celecoxib] Rash     patient had butterfly rash - \"lupus-like\"       Latex Rash       ROS:  Pertinent positives as noted in HPI; otherwise 12 point ROS negative.      Physical Exam:  EXAM:  /60 (Patient Site: Left Arm, Patient Position: Sitting, Cuff Size: Adult Regular)  Pulse 80  Temp 98.3  F (36.8  C) (Oral)   Resp 24  Wt 196 lb (88.9 kg)  BMI 33.64 kg/m2   Gen:  NAD, appears well, well-hydrated  HEENT:  TMs nl, oropharynx benign, nasal mucosa nl, conjunctiva clear  Neck:  Supple, no adenopathy, no thyromegaly, no carotid bruits, no JVD  Lungs:  Clear to auscultation bilaterally  Cor:  RRR no murmur  Abd:  Soft, nontender, BS+, no masses, no guarding or rebound, no HSM  Extr:  Multiple trigger points - aaliyah lower cervical, upper thoracic (R>L)  Neuro:  No asymmetry  Skin:  Warm/dry        Results:  Results for orders placed or performed in visit on 01/19/18   INR   Result Value Ref Range    INR 1.50 (H) 0.90 - 1.10         Procedure Note - Trigger Point Injections    Consent obtained: verbal    Indication:  Trigger point pain related to fibromyalgia    5 trigger points identified.  Each trigger point swabbed x 3 with Betadine swab.  Each trigger point then injected with 2 ml of 1% Lidocaine (no epi).    Total volume injected:  10 ml    Complications:  None, patient tolerated procedure well.    Plan:  Discussed care with patient.  RTC for further injections in 30 days prn.        "

## 2021-06-15 NOTE — PATIENT INSTRUCTIONS - HE
Mary Kay Tejada,    It was a pleasure to see you today at the [facility].     My recommendations after this visit include:    Echo next year  No need to valve replacement yet    SAAD Pizano MD, FACC, ZAC

## 2021-06-15 NOTE — PROGRESS NOTES
"ASSESSMENT/PLAN:  1. Fibromyalgia  lidocaine 10 mg/mL (1 %) injection 10 mL   2. Paroxysmal Atrial Fibrillation  INR   3. Chronic pain syndrome  oxyCODONE (ROXICODONE) 10 mg immediate release tablet   4. Trigger point of thoracic region         This is a 70 yo female with:  1.  Trigger point pain//Fibromyalgia - benefits from trigger point injections - see procedure note.    2.  Chronic Pain - unable to use NSAIDs due to GI bleeding - using Oxycodone -   Renewed Oxycodone  3.  Atrial Fib - uses warfarin (lifelong) - INR today    Return in about 1 month (around 3/24/2021) for Recheck.      Medications Discontinued During This Encounter   Medication Reason     oxyCODONE (ROXICODONE) 10 mg immediate release tablet Reorder     Patient Instructions   Riopan for gas.         Chief Complaint:  Chief Complaint   Patient presents with     Other     Trigger point injections       HPI:   Mary Kay Tejada is a 71 y.o. female c/o  Pain bad both left and right - aaliyah upper back  No increase in pain meds  No signs of active bleeding  Sugars \"okay\"  Feeling okay    PMH:   Patient Active Problem List    Diagnosis Date Noted     Chronic gastric ulcer without hemorrhage and without perforation 10/19/2020     Advanced directives, counseling/discussion 08/17/2020     Primary osteoarthritis of both knees 01/17/2020     Mixed stress and urge urinary incontinence 08/16/2019     Skin lesion 07/17/2019     Dyslipidemia      Upper GI bleed 12/13/2018     Melena 12/13/2018     Anemia due to blood loss, acute 07/18/2018     Diabetic polyneuropathy associated with type 2 diabetes mellitus (H) 07/18/2018     Chronic anemia 06/27/2018     Cataract 11/06/2017     Bunion, left 07/19/2017     Callus of foot 07/19/2017     Nonrheumatic aortic valve stenosis 05/23/2017     COPD (chronic obstructive pulmonary disease) (H) 05/23/2017     Chronic obstructive pulmonary disease with acute lower respiratory infection (H) 12/24/2016     Gastroenteritis " 12/24/2016     Syncope 12/22/2016     Opacity of lung on imaging study 12/22/2016     Acute gastritis 12/22/2016     Osteoarthritis of both knees 08/22/2016     Osteoarthritis, hip, bilateral 06/20/2016     Primary osteoarthritis of left knee 06/20/2016     Candidal intertrigo 05/11/2016     Type 2 diabetes mellitus with hypoglycemia (H)      Aspiration pneumonia (H) 03/01/2016     Controlled substance agreement signed 11/23/2015     Cervical neck pain with evidence of disc disease 07/22/2015     Headache 07/17/2015     Hematoma of abdominal wall 07/05/2015     Skin lesion of face 05/22/2015     Tendonitis of wrist, right 04/22/2015     Menopause 04/06/2015     Flashers or floaters of right eye 02/23/2015     Tendonitis of wrist, left 01/21/2015     Trigger point of thoracic region 01/21/2015     Generalized osteoarthrosis, involving multiple sites 01/14/2015     Vertigo 12/17/2014     Rotator cuff tendonitis 12/17/2014     Abnormal Weight Loss      Osteoarthritis Of The Shoulder      Chronic Constipation      Onychomycosis Of The Toenails      Diarrhea      Ganglion Of The Left Wrist      Larynx Edema      Obesity      Medial Epicondylitis      Skin Lesion      Urinary Incontinence      Chronic Major Depression      Dry Eye Syndrome      Nicotine Dependence      Hypokalemia      Essential hypertension      Muscle Cramps In The Calf      Edema      Atrial flutter (H)      Lump In / On The Skin      Chronic pain syndrome      Paroxysmal Atrial Fibrillation      Fibromyalgia      Nausea      Abdominal Pain      Peptic Ulcer      COPD exacerbation (H)      Mixed hyperlipidemia      Chronic Reflux Esophagitis      Benign Adenomatous Polyp Of The Large Intestine      Past Medical History:   Diagnosis Date     Anemia      Aortic stenosis      Atrial fibrillation (H)      Atrial flutter (H)      Benign neoplasm of adenomatous polyp     large intestine      Chronic constipation      Chronic pain syndrome      COPD (chronic  obstructive pulmonary disease) (H)     Oxygen at night      Dependence on supplemental oxygen     Oxygen at noc, during the day as needed     Depression      Diabetes mellitus (H)      Dry eye syndrome      Fibromyalgia      Ganglion     left wrist     GERD (gastroesophageal reflux disease)      Hyperlipidemia      Hypertension      Hypokalemia      Larynx edema      Lung disease      Malignant Vulvar Neoplasm     Created by Conversion [facility] Annotation: Apr 17 2007  8:24AM - DustinshaunaYusufe:  resection per Dr. Alfonso Mane 9/06;  Replacement Utility updated for latest IMO load     Medial epicondylitis      Onychomycosis      Osteoarthritis      Otitis Externa     Created by Conversion      Peptic ulcer      Polyneuropathy      Vulvar malignant neoplasm (H)      Past Surgical History:   Procedure Laterality Date     BREAST BIOPSY Right      BREAST BIOPSY Right 01/28/2015     BREAST BIOPSY Right 1/28/2015    Procedure: RIGHT BREAST BIOPSY AFTER WIRE LOCALIZATION AT 0940;  Surgeon: Renée Soriano MD;  Location: [facility] [place];  Service:      COLONOSCOPY N/A 6/14/2019    Procedure: COLONOSCOPY;  Surgeon: Eduardo Mora MD;  Location: [facility] [place];  Service: Gastroenterology     ESOPHAGOGASTRODUODENOSCOPY N/A 11/6/2018    Procedure: ESOPHAGOGASTRODUODENOSCOPY;  Surgeon: Lit Fernando MD;  Location: [facility] [place];  Service:      HYSTERECTOMY       JOINT REPLACEMENT Left     TKA     MS ABLATE HEART DYSRHYTHM FOCUS      Description: Catheter Ablation Atrial Fibrillation;  Recorded: 07/31/2012;  Comments: 7/24/12 PVI with Dr. Gardiner and nilay to all 5 pulm veins and CTI fl ablation line as well.     MS BIOPSY OF BREAST, INCISIONAL      Description: Incisional Breast Biopsy;  Recorded: 11/13/2007;  Comments: benign     MS SUPRACERV ABD HYSTERECTOMY      Description: Supracervical Hysterectomy;  Proc Date: 01/01/1985;  Comments: some cervix left!; ovaries intact; done for bleeding      Social History     Socioeconomic History     Marital status:      Spouse name: Not on file     Number of children: Not on file     Years of education: Not on file     Highest education level: Not on file   Occupational History     Not on file   Social Needs     Financial resource strain: Not on file     Food insecurity     Worry: Not on file     Inability: Not on file     Transportation needs     Medical: Not on file     Non-medical: Not on file   Tobacco Use     Smoking status: Current Every Day Smoker     Packs/day: 0.50     Types: Cigarettes     Smokeless tobacco: Never Used   Substance and Sexual Activity     Alcohol use: Yes     Comment: very little     Drug use: No     Sexual activity: Not on file   Lifestyle     Physical activity     Days per week: Not on file     Minutes per session: Not on file     Stress: Not on file   Relationships     Social connections     Talks on phone: Not on file     Gets together: Not on file     Attends [employer] service: Not on file     Active member of club or organization: Not on file     Attends meetings of clubs or organizations: Not on file     Relationship status: Not on file     Intimate partner violence     Fear of current or ex partner: Not on file     Emotionally abused: Not on file     Physically abused: Not on file     Forced sexual activity: Not on file   Other Topics Concern     Not on file   Social History Narrative     Not on file     Family History   Problem Relation Age of Onset     Heart failure Mother      Cancer Other         paternal HX-laryngeal      Alcoholism Sister      No Medical Problems Daughter      No Medical Problems Maternal Grandmother      No Medical Problems Maternal Grandfather      No Medical Problems Paternal Grandmother      No Medical Problems Paternal Grandfather      No Medical Problems Maternal Aunt      No Medical Problems Paternal Aunt      Alcoholism Sister      Alcoholism Brother      Alcohol abuse Father      Cancer  "Paternal Uncle         Gastric-Alcohol     Cancer Paternal Uncle         gastric-Alcohol     BRCA 1/2 Neg Hx      Breast cancer Neg Hx      Colon cancer Neg Hx      Endometrial cancer Neg Hx      Ovarian cancer Neg Hx        Meds:    Current Outpatient Medications:      ACCU-CHEK SOFTCLIX LANCETS lancets, TEST THREE TIMES DAILY, Disp: 300 each, Rfl: 3     albuterol (PROAIR HFA;PROVENTIL HFA;VENTOLIN HFA) 90 mcg/actuation inhaler, INHALE 2 PUFFS EVERY 4 HOURS AS NEEDED WHEEZING, Disp: 90 g, Rfl: 11     atorvastatin (LIPITOR) 20 MG tablet, TAKE 1 TABLET(20 MG) BY MOUTH AT BEDTIME, Disp: 90 tablet, Rfl: 3     BD ULTRA-FINE SHORT PEN NEEDLE 31 gauge x 5/16\" Ndle, TEST FOUR TIMES DAILY WITH MEALS AND AT BEDTIME, Disp: 400 each, Rfl: 3     blood-glucose meter (ONETOUCH VERIO IQ METER) Misc, Check blood sugar three times a day., Disp: 1 each, Rfl: 0     budesonide-formoteroL (SYMBICORT) 160-4.5 mcg/actuation inhaler, Inhale 2 puffs 2 (two) times a day. Inhale 2 puff by mouth twice daily, Disp: 1 Inhaler, Rfl: 2     cyclobenzaprine (FLEXERIL) 10 MG tablet, TAKE 1 TABLET(10 MG) BY MOUTH AT BEDTIME AS NEEDED FOR MUSCLE SPASMS, Disp: 30 tablet, Rfl: 0     diaper,brief,adult,disposable (ADULT BRIEFS - LARGE) Misc, Use 3-4 daily as needed for incontinence, Disp: 120 each, Rfl: 6     diltiazem (CARDIZEM CD) 120 MG 24 hr capsule, TAKE ONE CAPSULE BY MOUTH DAILY, Disp: 90 capsule, Rfl: 1     furosemide (LASIX) 20 MG tablet, TAKE 1 TABLET(20 MG) BY MOUTH DAILY AS NEEDED, Disp: 90 tablet, Rfl: 3     gabapentin (NEURONTIN) 600 MG tablet, TAKE 1 TABLET(600 MG) BY MOUTH THREE TIMES DAILY, Disp: 270 tablet, Rfl: 3     generic lancets (FINGERSTIX LANCETS), Dispense brand per patient's insurance at pharmacy discretion., Disp: 300 each, Rfl: 0     meclizine (ANTIVERT) 25 mg tablet, Take 1 tablet (25 mg total) by mouth 3 (three) times a day as needed., Disp: 45 tablet, Rfl: 5     metFORMIN (GLUCOPHAGE) 500 MG tablet, TAKE 1 TABLET(500 MG) BY " MOUTH TWICE DAILY WITH MEALS, Disp: 180 tablet, Rfl: 3     mometasone-formoterol (DULERA) 200-5 mcg/actuation HFAA inhaler, Inhale 2 puffs 2 (two) times a day., Disp: 1 Inhaler, Rfl: 5     nystatin (NYSTOP) powder, Apply 1 application topically 2 (two) times a day as needed. 2-3 times to affected area(s)., Disp: 60 g, Rfl: 3     omeprazole (PRILOSEC) 20 MG capsule, TAKE 1 CAPSULE(20 MG) BY MOUTH DAILY BEFORE BREAKFAST, Disp: 30 capsule, Rfl: 3     ONETOUCH VERIO TEST STRIPS strips, USE 1 EACH AS DIRECTED THREE TIMES DAILY AS NEEDED, Disp: 300 strip, Rfl: 3     oxyCODONE (ROXICODONE) 10 mg immediate release tablet, Take 1 tablet (10 mg total) by mouth every 6 (six) hours as needed., Disp: 120 tablet, Rfl: 0     sucralfate (CARAFATE) 1 gram tablet, TAKE 1 TABLET BY MOUTH FOUR TIMES DAILY(CRUSH TABLET AND MIX IT WITH A LITTLE WATER, THEN SWALLOW), Disp: 120 tablet, Rfl: 12     warfarin ANTICOAGULANT (COUMADIN/JANTOVEN) 5 MG tablet, TAKE 1/2 TO 1 TABLET BY MOUTH DAILY AS DIRECTED, Disp: 90 tablet, Rfl: 1     albuterol (PROVENTIL) 2.5 mg /3 mL (0.083 %) nebulizer solution, Take 3 mL (2.5 mg total) by nebulization every 4 (four) hours as needed for wheezing or shortness of breath., Disp: 75 mL, Rfl: 12     famotidine (PEPCID) 20 MG tablet, Take 1 tablet (20 mg total) by mouth 2 (two) times a day., Disp: 180 tablet, Rfl: 3     ipratropium-albuterol (DUO-NEB) 0.5-2.5 mg/3 mL nebulizer, INAHALE 1 VIAL IN NEBULIZER EVERY 4 HOURS AS NEEDED, Disp: 1440 mL, Rfl: 0     nicotine (NICODERM CQ) 21 mg/24 hr, Place 1 patch on the skin daily., Disp: 30 patch, Rfl: 1     polyethylene glycol (MIRALAX) 17 gram packet, Take 1 packet (17 g total) by mouth daily. (Patient taking differently: Take 17 g by mouth daily as needed.    ), Disp: , Rfl: 0    Current Facility-Administered Medications:      lidocaine 10 mg/mL (1 %) injection 10 mL, 10 mL, Other, Once, Cammy Bui MD    Allergies:  Allergies   Allergen Reactions      "Celebrex [Celecoxib] Rash     patient had butterfly rash - \"lupus-like\"       Latex Rash       ROS:  Pertinent positives as noted in HPI; otherwise 12 point ROS negative.      Physical Exam:  EXAM:  /62 (Patient Site: Left Arm, Patient Position: Sitting, Cuff Size: Adult Small)   Pulse 73   Temp 98.2  F (36.8  C) (Temporal)   Resp 12   Ht 5' 3.39\" (1.61 m)   Wt 152 lb 8 oz (69.2 kg)   BMI 26.69 kg/m     Gen:  NAD, appears well, well-hydrated  HEENT:  TMs nl, oropharynx benign, nasal mucosa nl, conjunctiva clear  Neck:  Supple, no adenopathy, no thyromegaly, no carotid bruits, no JVD  Lungs:  Clear to auscultation bilaterally  Cor:  RRR no murmur  Abd:  Soft, nontender, BS+, no masses, no guarding or rebound, no HSM  Extr:  Neg.  Back:  Trigger point pain - paraspinal muscles back  Neuro:  No asymmetry  Skin:  Warm/dry        Results:  Results for orders placed or performed in visit on 02/24/21   INR   Result Value Ref Range    INR 1.80 (H) 0.90 - 1.10         Procedure Note - Trigger Point Injections    Consent obtained: verbal    Indication:  Trigger point pain due to fibromyalgia    6 trigger points identified.  Each trigger point swabbed x 3 with Betadine swab.  Each trigger point then injected with 1.66 ml of 1% Lidocaine (no epi).    Total volume injected:  10 ml    Complications:  None, patient tolerated procedure well.    Plan:  Discussed care with patient.  RTC for further injections in 30 days prn.       "

## 2021-06-16 PROBLEM — L98.9 SKIN LESION: Status: ACTIVE | Noted: 2019-07-17

## 2021-06-16 PROBLEM — M21.612 BUNION, LEFT: Status: ACTIVE | Noted: 2017-07-19

## 2021-06-16 PROBLEM — M17.0 PRIMARY OSTEOARTHRITIS OF BOTH KNEES: Status: ACTIVE | Noted: 2020-01-17

## 2021-06-16 PROBLEM — Z71.89 ADVANCED DIRECTIVES, COUNSELING/DISCUSSION: Status: ACTIVE | Noted: 2020-08-17

## 2021-06-16 PROBLEM — K92.1 MELENA: Status: ACTIVE | Noted: 2018-12-13

## 2021-06-16 PROBLEM — D62 ANEMIA DUE TO BLOOD LOSS, ACUTE: Status: ACTIVE | Noted: 2018-07-18

## 2021-06-16 PROBLEM — K25.7 CHRONIC GASTRIC ULCER WITHOUT HEMORRHAGE AND WITHOUT PERFORATION: Status: ACTIVE | Noted: 2020-10-19

## 2021-06-16 PROBLEM — K92.2 UPPER GI BLEED: Status: ACTIVE | Noted: 2018-12-13

## 2021-06-16 PROBLEM — E11.42 DIABETIC POLYNEUROPATHY ASSOCIATED WITH TYPE 2 DIABETES MELLITUS (H): Status: ACTIVE | Noted: 2018-07-18

## 2021-06-16 PROBLEM — N39.46 MIXED STRESS AND URGE URINARY INCONTINENCE: Status: ACTIVE | Noted: 2019-08-16

## 2021-06-16 PROBLEM — D64.9 CHRONIC ANEMIA: Status: ACTIVE | Noted: 2018-06-27

## 2021-06-16 PROBLEM — L84 CALLUS OF FOOT: Status: ACTIVE | Noted: 2017-07-19

## 2021-06-16 PROBLEM — H26.9 CATARACT: Status: ACTIVE | Noted: 2017-11-06

## 2021-06-16 NOTE — PROGRESS NOTES
"ASSESSMENT/PLAN:  1. Trigger point of thoracic region  lidocaine 10 mg/mL (1 %) injection 10 mL   2. Fibromyalgia  lidocaine 10 mg/mL (1 %) injection 10 mL   3. Chronic pain syndrome  oxyCODONE (ROXICODONE) 10 mg immediate release tablet   4. Paroxysmal Atrial Fibrillation  INR       This is a 71-year-old female, seen today for evaluation of  1.  Chronic pain syndrome, related to fibromyalgia and trigger point pain.  She has benefited in the past from trigger point injections, doing these monthly to diminish the amount of opiate that she requires for other pain control.  We have injected lidocaine, total of 10 mL, today for 6 trigger point injections.  See procedure note below.  Otherwise, have refilled her monthly supply of oxycodone therapy.    2.  Paroxysmal atrial fibrillation, on warfarin therapy, due for INR testing, followed by her anticoagulation program.  Return in about 1 month (around 4/24/2021) for Recheck.      Medications Discontinued During This Encounter   Medication Reason     oxyCODONE (ROXICODONE) 10 mg immediate release tablet Reorder     There are no Patient Instructions on file for this visit.    Chief Complaint:  Chief Complaint   Patient presents with     Other     Trigger finger       HPI:   Mary Kay Tejada is a 71 y.o. female c/o  Pain - \"both sides\"  Still requesting trigger point injections  Has been using chronic opiates - no significant concern related to use/abuse  Feeling good      PMH:   Patient Active Problem List    Diagnosis Date Noted     Chronic gastric ulcer without hemorrhage and without perforation 10/19/2020     Advanced directives, counseling/discussion 08/17/2020     Primary osteoarthritis of both knees 01/17/2020     Mixed stress and urge urinary incontinence 08/16/2019     Skin lesion 07/17/2019     Dyslipidemia      Upper GI bleed 12/13/2018     Melena 12/13/2018     Anemia due to blood loss, acute 07/18/2018     Diabetic polyneuropathy associated with type 2 diabetes " mellitus (H) 07/18/2018     Chronic anemia 06/27/2018     Cataract 11/06/2017     Bunion, left 07/19/2017     Callus of foot 07/19/2017     Nonrheumatic aortic valve stenosis 05/23/2017     COPD (chronic obstructive pulmonary disease) (H) 05/23/2017     Chronic obstructive pulmonary disease with acute lower respiratory infection (H) 12/24/2016     Gastroenteritis 12/24/2016     Syncope 12/22/2016     Opacity of lung on imaging study 12/22/2016     Acute gastritis 12/22/2016     Osteoarthritis of both knees 08/22/2016     Osteoarthritis, hip, bilateral 06/20/2016     Primary osteoarthritis of left knee 06/20/2016     Candidal intertrigo 05/11/2016     Type 2 diabetes mellitus with hypoglycemia (H)      Aspiration pneumonia (H) 03/01/2016     Controlled substance agreement signed 11/23/2015     Cervical neck pain with evidence of disc disease 07/22/2015     Headache 07/17/2015     Hematoma of abdominal wall 07/05/2015     Skin lesion of face 05/22/2015     Tendonitis of wrist, right 04/22/2015     Menopause 04/06/2015     Flashers or floaters of right eye 02/23/2015     Tendonitis of wrist, left 01/21/2015     Trigger point of thoracic region 01/21/2015     Generalized osteoarthrosis, involving multiple sites 01/14/2015     Vertigo 12/17/2014     Rotator cuff tendonitis 12/17/2014     Abnormal Weight Loss      Osteoarthritis Of The Shoulder      Chronic Constipation      Onychomycosis Of The Toenails      Diarrhea      Ganglion Of The Left Wrist      Larynx Edema      Obesity      Medial Epicondylitis      Skin Lesion      Urinary Incontinence      Chronic Major Depression      Dry Eye Syndrome      Nicotine Dependence      Hypokalemia      Essential hypertension      Muscle Cramps In The Calf      Edema      Atrial flutter (H)      Lump In / On The Skin      Chronic pain syndrome      Paroxysmal Atrial Fibrillation      Fibromyalgia      Nausea      Abdominal Pain      Peptic Ulcer      COPD exacerbation (H)       Mixed hyperlipidemia      Chronic Reflux Esophagitis      Benign Adenomatous Polyp Of The Large Intestine      Past Medical History:   Diagnosis Date     Anemia      Aortic stenosis      Atrial fibrillation (H)      Atrial flutter (H)      Benign neoplasm of adenomatous polyp     large intestine      Chronic constipation      Chronic pain syndrome      COPD (chronic obstructive pulmonary disease) (H)     Oxygen at night      Dependence on supplemental oxygen     Oxygen at noc, during the day as needed     Depression      Diabetes mellitus (H)      Dry eye syndrome      Fibromyalgia      Ganglion     left wrist     GERD (gastroesophageal reflux disease)      Hyperlipidemia      Hypertension      Hypokalemia      Larynx edema      Lung disease      Malignant Vulvar Neoplasm     Created by Conversion [facility] Annotation: Apr 17 2007  8:24AM - Cammy Bui:  resection per Dr. Alfonso Mane 9/06;  Replacement Utility updated for latest IMO load     Medial epicondylitis      Onychomycosis      Osteoarthritis      Otitis Externa     Created by Conversion      Peptic ulcer      Polyneuropathy      Vulvar malignant neoplasm (H)      Past Surgical History:   Procedure Laterality Date     BREAST BIOPSY Right      BREAST BIOPSY Right 01/28/2015     BREAST BIOPSY Right 1/28/2015    Procedure: RIGHT BREAST BIOPSY AFTER WIRE LOCALIZATION AT 0940;  Surgeon: Renée Soriano MD;  [place] [facility] [place]:      COLONOSCOPY N/A 6/14/2019    Procedure: COLONOSCOPY;  Surgeon: Eduardo Mora MD;  Location: [facility] OR;  Service: Gastroenterology     ESOPHAGOGASTRODUODENOSCOPY N/A 11/6/2018    Procedure: ESOPHAGOGASTRODUODENOSCOPY;  Surgeon: Lit Fernando MD;  [place] [facility] [place]:      HYSTERECTOMY       JOINT REPLACEMENT Left     TKA     MT ABLATE HEART DYSRHYTHM FOCUS      Description: Catheter Ablation Atrial Fibrillation;  Recorded: 07/31/2012;  Comments: 7/24/12 PVI with  Dr. Gardiner and nilay to all 5 pulm veins and CTI fl ablation line as well.     LA BIOPSY OF BREAST, INCISIONAL      Description: Incisional Breast Biopsy;  Recorded: 11/13/2007;  Comments: benign     LA SUPRACERV ABD HYSTERECTOMY      Description: Supracervical Hysterectomy;  Proc Date: 01/01/1985;  Comments: some cervix left!; ovaries intact; done for bleeding     Social History     Socioeconomic History     Marital status:      Spouse name: Not on file     Number of children: Not on file     Years of education: Not on file     Highest education level: Not on file   Occupational History     Not on file   Social Needs     Financial resource strain: Not on file     Food insecurity     Worry: Not on file     Inability: Not on file     Transportation needs     Medical: Not on file     Non-medical: Not on file   Tobacco Use     Smoking status: Current Every Day Smoker     Packs/day: 0.50     Types: Cigarettes     Smokeless tobacco: Never Used   Substance and Sexual Activity     Alcohol use: Yes     Comment: very little     Drug use: No     Sexual activity: Not on file   Lifestyle     Physical activity     Days per week: Not on file     Minutes per session: Not on file     Stress: Not on file   Relationships     Social connections     Talks on phone: Not on file     Gets together: Not on file     Attends Mosque service: Not on file     Active member of club or organization: Not on file     Attends meetings of clubs or organizations: Not on file     Relationship status: Not on file     Intimate partner violence     Fear of current or ex partner: Not on file     Emotionally abused: Not on file     Physically abused: Not on file     Forced sexual activity: Not on file   Other Topics Concern     Not on file   Social History Narrative     Not on file     Family History   Problem Relation Age of Onset     Heart failure Mother      Cancer Other         paternal HX-laryngeal      Alcoholism Sister      No Medical  "Problems Daughter      No Medical Problems Maternal Grandmother      No Medical Problems Maternal Grandfather      No Medical Problems Paternal Grandmother      No Medical Problems Paternal Grandfather      No Medical Problems Maternal Aunt      No Medical Problems Paternal Aunt      Alcoholism Sister      Alcoholism Brother      Alcohol abuse Father      Cancer Paternal Uncle         Gastric-Alcohol     Cancer Paternal Uncle         gastric-Alcohol     BRCA 1/2 Neg Hx      Breast cancer Neg Hx      Colon cancer Neg Hx      Endometrial cancer Neg Hx      Ovarian cancer Neg Hx        Meds:    Current Outpatient Medications:      ACCU-CHEK SOFTCLIX LANCETS lancets, TEST THREE TIMES DAILY, Disp: 300 each, Rfl: 3     albuterol (PROAIR HFA;PROVENTIL HFA;VENTOLIN HFA) 90 mcg/actuation inhaler, INHALE 2 PUFFS EVERY 4 HOURS AS NEEDED WHEEZING, Disp: 90 g, Rfl: 11     albuterol (PROVENTIL) 2.5 mg /3 mL (0.083 %) nebulizer solution, Take 3 mL (2.5 mg total) by nebulization every 4 (four) hours as needed for wheezing or shortness of breath., Disp: 75 mL, Rfl: 12     BD ULTRA-FINE SHORT PEN NEEDLE 31 gauge x 5/16\" Ndle, TEST FOUR TIMES DAILY WITH MEALS AND AT BEDTIME, Disp: 400 each, Rfl: 3     blood-glucose meter (ONETOUCH VERIO IQ METER) Misc, Check blood sugar three times a day., Disp: 1 each, Rfl: 0     budesonide-formoteroL (SYMBICORT) 160-4.5 mcg/actuation inhaler, Inhale 2 puffs 2 (two) times a day. Inhale 2 puff by mouth twice daily, Disp: 1 Inhaler, Rfl: 2     cyclobenzaprine (FLEXERIL) 10 MG tablet, TAKE 1 TABLET(10 MG) BY MOUTH AT BEDTIME AS NEEDED FOR MUSCLE SPASMS, Disp: 30 tablet, Rfl: 0     diaper,brief,adult,disposable (ADULT BRIEFS - LARGE) Misc, Use 3-4 daily as needed for incontinence, Disp: 120 each, Rfl: 6     diltiazem (CARDIZEM CD) 120 MG 24 hr capsule, Take 1 capsule (120 mg total) by mouth daily., Disp: 90 capsule, Rfl: 1     famotidine (PEPCID) 20 MG tablet, Take 1 tablet (20 mg total) by mouth 2 (two) " times a day., Disp: 180 tablet, Rfl: 3     furosemide (LASIX) 20 MG tablet, TAKE 1 TABLET(20 MG) BY MOUTH DAILY AS NEEDED, Disp: 90 tablet, Rfl: 3     gabapentin (NEURONTIN) 600 MG tablet, TAKE 1 TABLET(600 MG) BY MOUTH THREE TIMES DAILY, Disp: 270 tablet, Rfl: 3     generic lancets (FINGERSTIX LANCETS), Dispense brand per patient's insurance at pharmacy discretion., Disp: 300 each, Rfl: 0     ipratropium-albuterol (DUO-NEB) 0.5-2.5 mg/3 mL nebulizer, INAHALE 1 VIAL IN NEBULIZER EVERY 4 HOURS AS NEEDED, Disp: 1440 mL, Rfl: 0     meclizine (ANTIVERT) 25 mg tablet, Take 1 tablet (25 mg total) by mouth 3 (three) times a day as needed., Disp: 45 tablet, Rfl: 5     metFORMIN (GLUCOPHAGE) 500 MG tablet, TAKE 1 TABLET(500 MG) BY MOUTH TWICE DAILY WITH MEALS, Disp: 180 tablet, Rfl: 3     mometasone-formoterol (DULERA) 200-5 mcg/actuation HFAA inhaler, Inhale 2 puffs 2 (two) times a day., Disp: 1 Inhaler, Rfl: 5     nicotine (NICODERM CQ) 21 mg/24 hr, Place 1 patch on the skin daily., Disp: 30 patch, Rfl: 1     nystatin (NYSTOP) powder, Apply 1 application topically 2 (two) times a day as needed. 2-3 times to affected area(s)., Disp: 60 g, Rfl: 3     omeprazole (PRILOSEC) 20 MG capsule, TAKE 1 CAPSULE(20 MG) BY MOUTH DAILY BEFORE BREAKFAST, Disp: 30 capsule, Rfl: 3     ONETOUCH VERIO TEST STRIPS strips, USE 1 EACH AS DIRECTED THREE TIMES DAILY AS NEEDED, Disp: 300 strip, Rfl: 3     oxyCODONE (ROXICODONE) 10 mg immediate release tablet, Take 1 tablet (10 mg total) by mouth every 6 (six) hours as needed., Disp: 120 tablet, Rfl: 0     polyethylene glycol (MIRALAX) 17 gram packet, Take 1 packet (17 g total) by mouth daily. (Patient taking differently: Take 17 g by mouth daily as needed.    ), Disp: , Rfl: 0     sucralfate (CARAFATE) 1 gram tablet, TAKE 1 TABLET BY MOUTH FOUR TIMES DAILY(CRUSH TABLET AND MIX IT WITH A LITTLE WATER, THEN SWALLOW), Disp: 120 tablet, Rfl: 12     warfarin ANTICOAGULANT (COUMADIN/JANTOVEN) 5 MG  "tablet, TAKE 1/2 TO 1 TABLET BY MOUTH DAILY AS DIRECTED, Disp: 90 tablet, Rfl: 1     atorvastatin (LIPITOR) 20 MG tablet, TAKE 1 TABLET(20 MG) BY MOUTH AT BEDTIME, Disp: 90 tablet, Rfl: 3     cephalexin (KEFLEX) 500 MG capsule, Take 1 capsule (500 mg total) by mouth 3 (three) times a day for 10 days., Disp: 30 capsule, Rfl: 0    Current Facility-Administered Medications:      lidocaine 10 mg/mL (1 %) injection 10 mL, 10 mL, Other, Once, Cammy Bui MD     lidocaine 10 mg/mL (1 %) injection 10 mL, 10 mL, Other, Once, Cammy Bui MD    Allergies:  Allergies   Allergen Reactions     Celebrex [Celecoxib] Rash     patient had butterfly rash - \"lupus-like\"       Latex Rash       ROS:  Pertinent positives as noted in HPI; otherwise 12 point ROS negative.      Physical Exam:  EXAM:  /62 (Patient Site: Right Arm, Patient Position: Sitting, Cuff Size: Adult Small)   Pulse 76   Temp 97.2  F (36.2  C) (Temporal)   Resp 16   Ht 5' 4\" (1.626 m)   Wt 152 lb (68.9 kg)   BMI 26.09 kg/m     Gen:  NAD, appears chronically ill, well-hydrated  HEENT:  TMs nl, oropharynx benign, nasal mucosa nl, conjunctiva clear  Neck:  Supple, no adenopathy, no thyromegaly, no carotid bruits, no JVD  Lungs:  Clear to auscultation bilaterally  Cor:  RRR no murmur  Abd:  Soft, nontender, BS+, no masses, no guarding or rebound, no HSM  Extr:  Neg., multiple joints with DJD  Back:  Multiple trigger points in lower cervical, suprascapular, thoracic paraspinal   Neuro:  No asymmetry  Skin:  Warm/dry        Results:  Results for orders placed or performed in visit on 03/24/21   INR   Result Value Ref Range    INR 2.90 (H) 0.90 - 1.10           Procedure Note - Trigger Point Injections    Consent obtained: verbal    Indication:  Trigger point    6 trigger points identified.  Each trigger point swabbed x 3 with Betadine swab.  Each trigger point then injected with 1.7 ml of 1% Lidocaine (no epi).    Total volume " injected:  10 ml    Complications:  None, patient tolerated procedure well.    Plan:  Discussed care with patient.  RTC for further injections in 30 days prn.

## 2021-06-16 NOTE — TELEPHONE ENCOUNTER
Telephone Encounter by Marija Crawley RN at 6/3/2019 12:08 PM     Author: Marija Crawley RN Service: -- Author Type: Registered Nurse    Filed: 6/3/2019 12:15 PM Encounter Date: 6/3/2019 Status: Signed    : Marija Crawley RN (Registered Nurse)       Patient is scheduled for upcoming colonoscopy 6/14/19.    Previously was scheduled for a colonoscopy in March, orders in encounter 12/7/18:    Yue Dunbar, PharmD           12:31 PM   Note      VANDANA-PROCEDURAL ANTICOAGULATION  MANAGEMENT     Assessment      Warfarin interruption plan for Colonosocpy.     Atrial fibrillation/Flutter       Risk stratification for thromboembolism: low. (2017 ACC periprocedure pathway for NVAF)  ?  [id number] = 4 (hypertension, diabetes, age 65-74, female)     Bridge not indicated for low risk stratification per ACC vandana-procedure pathway     Recommendation:       Okay to hold warfarin 4 days prior to procedure    No bridge    Resume warfarin night of procedure if okay with provider doing procedure.    INR check 1-2 weeks prior to procedure and ~ 1 week after restarting warfarin.     Yue Dunbar, PharmD   Anticoagulation               Dr. Brizuela, please advise if you are still in agreement with orders as above? ACN will contact patient to review if you are in agreement.

## 2021-06-16 NOTE — TELEPHONE ENCOUNTER
Reason for call:  Patient reporting a symptom    Symptom or request: urine is cloudy,low back pain   Duration (how long have symptoms been present): since   Friday 3 days  Have you been treated for this before? Yes    Additional comments: would like a antibiotic called in  she feels she has a kidney infection    Phone Number patient can be reached at:  Home number on file [phone] (home)    Best Time:  As soon as possible  Can we leave a detailed message on this number: Yes    Call taken on 3/29/2021 at 12:16 PM by Geneva Prieto

## 2021-06-16 NOTE — TELEPHONE ENCOUNTER
ANTICOAGULATION  MANAGEMENT    Assessment     Today's INR result of 2.9 is Therapeutic (goal INR of 2.0-3.0)        Warfarin taken as previously instructed    No new diet changes affecting INR    No new medication/supplements affecting INR    Continues to tolerate warfarin with no reported s/s of bleeding or thromboembolism     Previous INR was Subtherapeutic    Plan:     Left detailed message for Mary Kay regarding INR result and instructed:      Warfarin Dosing Instructions:  Continue current warfarin dose 5 mg daily  (0 % change)    Instructed patient to follow up no later than: 4 weeks. It has been 4 weeks since last check    Education provided:     Instructed to call the [facility] for any changes, questions or concerns. (#[phone])   ?   Mena Dumont RN    Subjective/Objective:      Mary Kay Tejada, a 71 y.o. female is on warfarin. Mary Kay      Mary Kay reports:     Home warfarin dose: as updated on anticoagulation calendar per template     Missed doses: No     Medication changes:  No     S/S of bleeding or thromboembolism:  No     New Injury or illness:  No     Changes in diet or alcohol consumption:  No     Upcoming surgery, procedure or cardioversion:  No    Anticoagulation Episode Summary     Current INR goal:  2.0-3.0   TTR:  49.8 % (1 y)   Next INR check:  4/21/2021   INR from last check:  2.90 (3/24/2021)   Weekly max warfarin dose:     Target end date:  Indefinite   INR check location:     Preferred lab:     Send INR reminders to:  [facility]    Indications    Paroxysmal Atrial Fibrillation [I48.91]           Comments:           Anticoagulation Care Providers     Provider Role Specialty Phone number    Cammy Bui MD Referring Family Medicine [phone]

## 2021-06-16 NOTE — PROGRESS NOTES
Preoperative Exam    Scheduled Procedure: Cataract  Surgery Date:  3/19/18  Surgery Location: [facility], [place], fax [phone]    Surgeon:  Dr. Trujillo    Assessment/Plan:     1. Preop examination  Hemoglobin   2. Cataract     3. Type 2 diabetes mellitus with hyperglycemia, without long-term current use of insulin  Basic Metabolic Panel    Glycosylated Hemoglobin A1c   4. Chronic obstructive pulmonary disease with acute lower respiratory infection     5. Paroxysmal atrial fibrillation  Basic Metabolic Panel   6. Atrial fibrillation  INR   7. Chronic pain syndrome  oxyCODONE (ROXICODONE) 10 mg immediate release tablet   8. Trigger point of thoracic region       This is a 69 yo female with multiple chronic underlying medical conditions - all stable currently.  Here for preop assessment prior to planned cataract surgery.  Labs ordered, as below.  Cardiac issues are stable - last echo within last year.  Reviewed labs - stable.  OK for surgery.    Patient has chronic pain syndrome secondary to fibromyalgia.  She has been treated with trigger point injections generally monthly - will do this today for treatment of pain.  Also - will refer Oxycodone although will predate it as it is not due until next week.    Surgical Procedure Risk: Low (reported cardiac risk generally < 1%)  Have you had prior anesthesia?: Yes  Have you or any family members had a previous anesthesia reaction:  No  Do you or any family members have a history of a clotting or bleeding disorder?: No  Cardiac Risk Assessment: no increased risk for major cardiac complications    Patient approved for surgery with general or local anesthesia.        Functional Status: Independent  Patient plans to recover at home with family.     Subjective:      Mary Kay Tejada is a 68 y.o. female who presents for a preoperative consultation.     Has h/o decreased vision - has bilateral cataract -  Starting with right cataract - this is worst vision  Has  "the drops to use prior to surgery    Also -patient has fibromyalgia - has been getting trigger point injections for pain control -     All other systems reviewed and are negative, other than those listed in the HPI.    Pertinent History  Do you have difficulty breathing or chest pain after walking up a flight of stairs: No  History of obstructive sleep apnea: No  Steroid use in the last 6 months: Yes: uses occ steroid for bronchitis (hasn't taken for several months)  Frequent Aspirin/NSAID use: No  Prior Blood Transfusion: No  Prior Blood Transfusion Reaction: No  If for some reason prior to, during or after the procedure, if it is medically indicated, would you be willing to have a blood transfusion?:  There is no transfusion refusal.    Current Outpatient Prescriptions   Medication Sig Dispense Refill     ACCU-CHEK SOFTCLIX LANCETS lancets TEST THREE TIMES DAILY 200 each 2     albuterol (PROVENTIL) 2.5 mg /3 mL (0.083 %) nebulizer solution Take 3 mL (2.5 mg total) by nebulization every 4 (four) hours as needed for wheezing or shortness of breath. 75 mL 12     albuterol (VENTOLIN HFA) 90 mcg/actuation inhaler INHALE 1 TO 2 PUFFS BY MOUTH EVERY 4 TO 6 HOURS AS NEEDED 18 g 6     atorvastatin (LIPITOR) 20 MG tablet TAKE 1 TABLET(20 MG) BY MOUTH AT BEDTIME 90 tablet 0     BD INSULIN PEN NEEDLE UF SHORT 31 gauge x 5/16\" Ndle TEST FOUR TIMES DAILY WITH MEALS AND AT BEDTIME 300 each 2     blood glucose meter (GLUCOMETER) Use 1 each As Directed as needed. Dispense glucometer brand per patient's insurance at pharmacy discretion. 1 each 0     blood glucose test (ACCU-CHEK PEDRO PLUS TEST STRP) strips Use 1 test strip to test 3 times daily. Dispense brand per patient's insurance at pharmacy discretion. 100 each 11     budesonide-formoterol (SYMBICORT) 160-4.5 mcg/actuation inhaler Inhale 2 puffs 2 (two) times a day. 1 Inhaler 2     CARTIA  mg 24 hr capsule TAKE 1 CAPSULE BY MOUTH DAILY 90 capsule 0     DAILY-FAYE tablet " TAKE 1 TABLET BY MOUTH EVERY DAY 90 tablet 0     furosemide (LASIX) 20 MG tablet TAKE 1 TABLET(20 MG) BY MOUTH DAILY 90 tablet 3     gabapentin (NEURONTIN) 300 MG capsule 1 tablet po qam, 2 tablet po qhs 270 capsule 1     glipiZIDE (GLUCOTROL XL) 10 MG 24 hr tablet TAKE 1 TABLET(10 MG) BY MOUTH DAILY 90 tablet 1     insulin aspart (NOVOLOG FLEXPEN) 100 unit/mL injection pen Sliding scale QID (meals/bedtime): -180, 2 units; -220, 4 u; -260, 6 u; -300, 8 u; -340, 10 u; BG >340, 12 u 5 Pre-filled Pen Syringe 0     ipratropium-albuterol (DUO-NEB) 0.5-2.5 mg/3 mL nebulizer INHALE 1 VIAL IN NEBULIZER EVERY 4 HOURS AS NEEDED 1440 mL 0     leg brace (ELASTIC KNEE SUPPORT) Misc Use 1 each As Directed daily.  0     loperamide (IMODIUM) 2 mg capsule Take 2 capsules by mouth initially followed by 1 capsule after each loose stool bowel movement. Do not exceed 8 capsules per day (Patient taking differently: Take 2-4 mg by mouth see administration instructions. Take 2 capsules by mouth initially followed by 1 capsule after each loose stool bowel movement. Do not exceed 8 capsules per day) 30 capsule 1     meclizine (ANTIVERT) 25 mg tablet TAKE 1 TABLET(25 MG) BY MOUTH THREE TIMES DAILY AS NEEDED FOR DIZZINESS OR NAUSEA 30 tablet 5     metFORMIN (GLUCOPHAGE) 500 MG tablet Take 1 tablet (500 mg total) by mouth 2 (two) times a day with meals. 60 tablet 6     moxifloxacin (VIGAMOX) 0.5 % ophthalmic solution INT 1 GTT IN OD FOUR TIMES DAILY. START 1 DAY B SURGERY AND U UNTIL BOTTLE IS FINISHED  1     MULTIVITAMIN WITH MINERALS (HAIR,SKIN AND NAILS ORAL) Take 3 tablets by mouth daily. Chewable formulation.       NEBULIZER/COMPRESSOR (NEBULIZER AND COMPRESSOR MISC) Use As Directed. Use UTD       nicotine (NICODERM CQ) 21 mg/24 hr APPLY ONE PATCH ON THE SKIN EVERY DAY 28 patch 5     nystatin (NYSTOP) powder Apply 1 application topically 2 (two) times a day as needed. 2-3 times to affected area(s). 15 g 3      olopatadine (PATANOL) 0.1 % ophthalmic solution Administer 1 drop to both eyes 2 (two) times a day as needed for allergies.        omeprazole (PRILOSEC) 20 MG capsule TAKE 1 CAPSULE(20 MG) BY MOUTH TWICE DAILY BEFORE MEALS 60 capsule 11     omeprazole (PRILOSEC) 20 MG capsule TAKE 1 CAPSULE(20 MG) BY MOUTH TWICE DAILY BEFORE MEALS 180 capsule 4     ONETOUCH ULTRA TEST strips USE 1 EACH AS DIRECTED 200 strip 2     [START ON 3/21/2018] oxyCODONE (ROXICODONE) 10 mg immediate release tablet Take 1 tablet (10 mg total) by mouth every 6 (six) hours as needed. 120 tablet 0     polymyxin B-trimethoprim (FOR POLYTRIM) 10,000 unit- 1 mg/mL Drop ophthalmic drops 1 drop in affected eye q 3 hours while awake 10 mL 0     prednisoLONE acetate (PRED-FORTE) 1 % ophthalmic suspension SHAKE LQ AND INT 1 GTT IN OD FOUR TIMES DAILY. START 1 DAY B SURGERY AND U UNTIL INSTRUCTED TO DISCONTINUE  1     predniSONE (DELTASONE) 10 mg tablet 6 tabs po daily x 2 d, 5 tabs po daily x 2 d, 4 tabs po daily x 2 d,  3 tabs po daily x 2 d,  2 tabs po daily x 2 d,  1 tab po daily x 2 d 42 tablet 0     ranitidine (ZANTAC) 150 MG tablet Take 1 tablet (150 mg total) by mouth 2 (two) times a day. 180 tablet 2     UNABLE TO FIND Take 1 capsule by mouth daily. Med Name: Probiotic with green tea.       warfarin (COUMADIN) 5 MG tablet TAKE 1 TABLET(5 MG) BY MOUTH DAILY 90 tablet 0     warfarin (COUMADIN) 5 MG tablet Take 1/2 tablet by mouth on Tuesday and Friday, and 1 tablet all other days or as directed based on inr 90 tablet 1     Current Facility-Administered Medications   Medication Dose Route Frequency Provider Last Rate Last Dose     lidocaine 10 mg/mL (1 %) injection 10 mL  10 mL Intradermal Once Cammy Bui MD         lidocaine 10 mg/mL (1 %) injection 10 mL  10 mL Intradermal Q28 Days Cammy Bui MD   10 mL at 03/16/18 1123        Allergies   Allergen Reactions     Celebrex [Celecoxib] Rash     patient had butterfly  "rash - \"lupus-like\"       Latex Rash       Patient Active Problem List   Diagnosis     Abnormal Weight Loss     Osteoarthritis Of The Shoulder     Chronic Constipation     Onychomycosis Of The Toenails     Diarrhea     Ganglion Of The Left Wrist     Larynx Edema     Obesity     Malignant Vulvar Neoplasm     Medial Epicondylitis     Skin Lesion     Urinary Incontinence     Chronic Major Depression     Dry Eye Syndrome     Nicotine Dependence     Hypokalemia     Essential hypertension     Muscle Cramps In The Calf     Edema     Atrial flutter     Lump In / On The Skin     Type 2 diabetes mellitus with hyperglycemia     Chronic pain syndrome     Paroxysmal Atrial Fibrillation     Fibromyalgia     Nausea     Abdominal Pain     Peptic Ulcer     COPD exacerbation     Mixed hyperlipidemia     Chronic Reflux Esophagitis     Benign Adenomatous Polyp Of The Large Intestine     Vertigo     Rotator cuff tendonitis     Generalized osteoarthrosis, involving multiple sites     Tendonitis of wrist, left     Trigger point of thoracic region     Flashers or floaters of right eye     Menopause     Tendonitis of wrist, right     Skin lesion of face     Hematoma of abdominal wall     Headache     Cervical neck pain with evidence of disc disease     Controlled substance agreement signed     Aspiration pneumonia     Type 2 diabetes mellitus with hypoglycemia     Candidal intertrigo     Osteoarthritis, hip, bilateral     Primary osteoarthritis of left knee     Osteoarthritis of both knees     Syncope     Opacity of lung on imaging study     Acute gastritis     Chronic obstructive pulmonary disease with acute lower respiratory infection     Gastroenteritis     DM neuropathy, type II diabetes mellitus     Moderate aortic stenosis     COPD (chronic obstructive pulmonary disease)     Bunion     Callus of foot     Cataract       Past Medical History:   Diagnosis Date     Aortic stenosis      Atrial fibrillation      Atrial flutter      Benign " neoplasm of adenomatous polyp     large intestine      Chronic constipation      Chronic pain syndrome      COPD (chronic obstructive pulmonary disease)     Oxygen at night      Depression      Diabetes mellitus      Dry eye syndrome      Fibromyalgia      Ganglion     left wrist     GERD (gastroesophageal reflux disease)      Hyperlipidemia      Hypertension      Hypokalemia      Larynx edema      Lung disease      Medial epicondylitis      Onychomycosis      Osteoarthritis      Otitis Externa     Created by Conversion      Peptic ulcer      Type 2 diabetes mellitus      Vulvar malignant neoplasm        Past Surgical History:   Procedure Laterality Date     BREAST BIOPSY Right      BREAST BIOPSY Right 01/28/2015     BREAST BIOPSY Right 1/28/2015    Procedure: RIGHT BREAST BIOPSY AFTER WIRE LOCALIZATION AT 0940;  Surgeon: Renée Soriano MD;  Location: [facility];  Service:      HYSTERECTOMY       LA ABLATE HEART DYSRHYTHM FOCUS      Description: Catheter Ablation Atrial Fibrillation;  Recorded: 07/31/2012;  Comments: 7/24/12 PVI with Dr. Gardiner and nilay to all 5 pulm veins and CTI fl ablation line as well.     LA BIOPSY OF BREAST, INCISIONAL      Description: Incisional Breast Biopsy;  Recorded: 11/13/2007;  Comments: benign     LA SUPRACERV ABD HYSTERECTOMY      Description: Supracervical Hysterectomy;  Proc Date: 01/01/1985;  Comments: some cervix left!; ovaries intact; done for bleeding     LA TOTAL ABDOM HYSTERECTOMY      Description: Total Abdominal Hysterectomy;  Recorded: 12/31/2013;     LA TOTAL KNEE ARTHROPLASTY      Description: Total Knee Arthroplasty;  Recorded: 11/13/2007;       Social History     Social History     Marital status:      Spouse name: N/A     Number of children: N/A     Years of education: N/A     Occupational History     Not on file.     Social History Main Topics     Smoking status: Light Tobacco Smoker     Types: Cigarettes     Last attempt to quit: 1/1/2014      Smokeless tobacco: Never Used      Comment: E-cig some day     Alcohol use Yes      Comment: very little     Drug use: No     Sexual activity: Not on file     Other Topics Concern     Not on file     Social History Narrative       Patient Care Team:  Cammy Bui MD as PCP - General          Objective:     Vitals:    03/16/18 0934   BP: 114/52   Pulse: 80   Resp: 20   Temp: 98  F (36.7  C)   TempSrc: Oral   Weight: 202 lb (91.6 kg)         Physical Exam:  EXAM:  /52 (Patient Site: Left Arm, Patient Position: Sitting, Cuff Size: Adult Regular)  Pulse 80  Temp 98  F (36.7  C) (Oral)   Resp 20  Wt 202 lb (91.6 kg)  Breastfeeding? No  BMI 34.67 kg/m2   Gen:  NAD, appears well, well-hydrated  HEENT:  TMs nl, oropharynx benign, nasal mucosa nl, conjunctiva clear, bilateral cataract  Neck:  Supple, no adenopathy, no thyromegaly, no carotid bruits, no JVD  Lungs:  Clear to auscultation bilaterally  Cor:  RRR no murmur  Abd:  Soft, nontender, BS+, no masses, no guarding or rebound, no HSM  Extr:  DJD - knees/hands  Neuro:  No asymmetry, Nl motor tone/strength, nl sensation, reflexes =, gait nl, nl coordination, CN intact,   Skin:  Warm/dry        There are no Patient Instructions on file for this visit.        Labs:  Recent Results (from the past 24 hour(s))   Hemoglobin    Collection Time: 03/16/18 10:28 AM   Result Value Ref Range    Hemoglobin 9.8 (L) 12.0 - 16.0 g/dL   Glycosylated Hemoglobin A1c    Collection Time: 03/16/18 10:28 AM   Result Value Ref Range    Hemoglobin A1c 6.4 (H) 3.5 - 6.0 %   INR    Collection Time: 03/16/18 10:28 AM   Result Value Ref Range    INR 2.00 (H) 0.90 - 1.10       Immunization History   Administered Date(s) Administered     DT (pediatric) 03/01/2004     Hep A, historic 03/11/2008, 08/03/2010     Influenza high dose, seasonal 09/21/2015, 10/19/2016, 10/20/2017     Influenza, inj, historic,unspecified 09/26/2007, 10/22/2008     Influenza, seasonal,quad inj 6-35  mos 09/25/2009, 09/10/2010, 10/05/2011, 09/14/2012, 09/14/2012, 09/20/2013, 09/19/2014     Pneumo Conj 13-V (2010&after) 07/22/2015     Pneumo Polysac 23-V 06/22/2010, 10/19/2016     Td,adult,historic,unspecified 03/11/2008     Tdap 03/11/2008     ZOSTER, LIVE 08/25/2012, 07/22/2015       Procedure Note - Trigger Point Injections    Consent obtained: verbal    Indication:  Fibromyalgia trigger points - right posterior thoracic/suprascapular    5 trigger points identified.  Each trigger point swabbed x 3 with Betadine swab.  Each trigger point then injected with 2 ml of 1% Lidocaine (no epi).    Total volume injected:  10 ml    Complications:  None, patient tolerated procedure well.    Plan:  Discussed care with patient.  RTC for further injections in 30 days prn.        Electronically signed by Cammy Bui MD 03/16/18 9:28 AM

## 2021-06-16 NOTE — PROGRESS NOTES
ASSESSMENT/PLAN:  1. Trigger point of thoracic region     2. Atrial fibrillation  INR   3. GERD (gastroesophageal reflux disease)  ranitidine (ZANTAC) 150 MG tablet   4. Chronic pain syndrome  oxyCODONE (ROXICODONE) 10 mg immediate release tablet    gabapentin (NEURONTIN) 300 MG capsule       67 yo female with:  1.  Trigger point pain/ fibromyalgia - here for trigger point injection - see procedure note  2.  Atrial fibrillation - check INR today - has upcoming cataract urgery - discussed that she likely won't need to stop coumadin for surgery  3.  GERD - desires refill on Ranitidine - written today  4.  Chronic Pain - will continue Oxycodone - has shown no increasing use of medication ; isn't able to tolerate NSaiDs due to previous GI bleeding; uses Gabapentin as well - maximize dose        Medications Discontinued During This Encounter   Medication Reason     gabapentin (NEURONTIN) 100 MG capsule      ranitidine (ZANTAC) 150 MG tablet Reorder     oxyCODONE (ROXICODONE) 10 mg immediate release tablet Reorder     gabapentin (NEURONTIN) 300 MG capsule Reorder     There are no Patient Instructions on file for this visit.    Chief Complaint:  Chief Complaint   Patient presents with     Injections     trigger point       HPI:   Mary Kay Tejada is a 68 y.o. female c/o  Here for trigger point injections - pain down right arm    Has cataract surgery on 3/19/18 - wants to wait on preop for a couple weeks    Needs pain pills      PMH:   Patient Active Problem List    Diagnosis Date Noted     Cataract 11/06/2017     Bunion 07/19/2017     Callus of foot 07/19/2017     Moderate aortic stenosis 05/23/2017     COPD (chronic obstructive pulmonary disease) 05/23/2017     DM neuropathy, type II diabetes mellitus 05/19/2017     Chronic obstructive pulmonary disease with acute lower respiratory infection 12/24/2016     Gastroenteritis 12/24/2016     Syncope 12/22/2016     Opacity of lung on imaging study 12/22/2016     Acute  gastritis 12/22/2016     Osteoarthritis of both knees 08/22/2016     Osteoarthritis, hip, bilateral 06/20/2016     Primary osteoarthritis of left knee 06/20/2016     Candidal intertrigo 05/11/2016     Type 2 diabetes mellitus with hypoglycemia      Aspiration pneumonia 03/01/2016     Controlled substance agreement signed 11/23/2015     Cervical neck pain with evidence of disc disease 07/22/2015     Headache 07/17/2015     Hematoma of abdominal wall 07/05/2015     Skin lesion of face 05/22/2015     Tendonitis of wrist, right 04/22/2015     Menopause 04/06/2015     Flashers or floaters of right eye 02/23/2015     Tendonitis of wrist, left 01/21/2015     Trigger point of thoracic region 01/21/2015     Generalized osteoarthrosis, involving multiple sites 01/14/2015     Vertigo 12/17/2014     Rotator cuff tendonitis 12/17/2014     Abnormal Weight Loss      Osteoarthritis Of The Shoulder      Chronic Constipation      Onychomycosis Of The Toenails      Diarrhea      Ganglion Of The Left Wrist      Larynx Edema      Obesity      Malignant Vulvar Neoplasm      Medial Epicondylitis      Skin Lesion      Urinary Incontinence      Chronic Major Depression      Dry Eye Syndrome      Nicotine Dependence      Hypokalemia      Essential hypertension      Muscle Cramps In The Calf      Edema      Atrial flutter      Lump In / On The Skin      Type 2 diabetes mellitus with hyperglycemia      Chronic pain syndrome      Paroxysmal Atrial Fibrillation      Fibromyalgia      Nausea      Abdominal Pain      Peptic Ulcer      COPD exacerbation      Mixed hyperlipidemia      Chronic Reflux Esophagitis      Benign Adenomatous Polyp Of The Large Intestine      Past Medical History:   Diagnosis Date     Aortic stenosis      Atrial fibrillation      Atrial flutter      Benign neoplasm of adenomatous polyp     large intestine      Chronic constipation      Chronic pain syndrome      COPD (chronic obstructive pulmonary disease)     Oxygen at  night      Depression      Diabetes mellitus      Dry eye syndrome      Fibromyalgia      Ganglion     left wrist     GERD (gastroesophageal reflux disease)      Hyperlipidemia      Hypertension      Hypokalemia      Larynx edema      Lung disease      Medial epicondylitis      Onychomycosis      Osteoarthritis      Otitis Externa     Created by Conversion      Peptic ulcer      Type 2 diabetes mellitus      Vulvar malignant neoplasm      Past Surgical History:   Procedure Laterality Date     BREAST BIOPSY Right      BREAST BIOPSY Right 01/28/2015     BREAST BIOPSY Right 1/28/2015    Procedure: RIGHT BREAST BIOPSY AFTER WIRE LOCALIZATION AT 0940;  Surgeon: Renée Soriano MD;  Location: [facility];  Service:      HYSTERECTOMY       RI ABLATE HEART DYSRHYTHM FOCUS      Description: Catheter Ablation Atrial Fibrillation;  Recorded: 07/31/2012;  Comments: 7/24/12 PVI with Dr. Gardiner and nilay to all 5 pulm veins and CTI fl ablation line as well.     RI BIOPSY OF BREAST, INCISIONAL      Description: Incisional Breast Biopsy;  Recorded: 11/13/2007;  Comments: benign     RI SUPRACERV ABD HYSTERECTOMY      Description: Supracervical Hysterectomy;  Proc Date: 01/01/1985;  Comments: some cervix left!; ovaries intact; done for bleeding     RI TOTAL ABDOM HYSTERECTOMY      Description: Total Abdominal Hysterectomy;  Recorded: 12/31/2013;     RI TOTAL KNEE ARTHROPLASTY      Description: Total Knee Arthroplasty;  Recorded: 11/13/2007;     Social History     Social History     Marital status:      Spouse name: N/A     Number of children: N/A     Years of education: N/A     Occupational History     Not on file.     Social History Main Topics     Smoking status: Light Tobacco Smoker     Types: Cigarettes     Last attempt to quit: 1/1/2014     Smokeless tobacco: Never Used      Comment: E-cig some day     Alcohol use Yes      Comment: very little     Drug use: No     Sexual activity: Not on file     Other Topics  "Concern     Not on file     Social History Narrative       Meds:    Current Outpatient Prescriptions:      ACCU-CHEK SOFTCLIX LANCETS lancets, TEST THREE TIMES DAILY, Disp: 200 each, Rfl: 2     albuterol (PROVENTIL) 2.5 mg /3 mL (0.083 %) nebulizer solution, Take 3 mL (2.5 mg total) by nebulization every 4 (four) hours as needed for wheezing or shortness of breath., Disp: 75 mL, Rfl: 12     albuterol (VENTOLIN HFA) 90 mcg/actuation inhaler, INHALE 1 TO 2 PUFFS BY MOUTH EVERY 4 TO 6 HOURS AS NEEDED, Disp: 18 g, Rfl: 6     atorvastatin (LIPITOR) 20 MG tablet, TAKE 1 TABLET(20 MG) BY MOUTH AT BEDTIME, Disp: 90 tablet, Rfl: 0     BD INSULIN PEN NEEDLE UF SHORT 31 gauge x 5/16\" Ndle, TEST FOUR TIMES DAILY WITH MEALS AND AT BEDTIME, Disp: 300 each, Rfl: 2     blood glucose meter (GLUCOMETER), Use 1 each As Directed as needed. Dispense glucometer brand per patient's insurance at pharmacy discretion., Disp: 1 each, Rfl: 0     blood glucose test (ACCU-CHEK PEDRO PLUS TEST STRP) strips, Use 1 test strip to test 3 times daily. Dispense brand per patient's insurance at pharmacy discretion., Disp: 100 each, Rfl: 11     budesonide-formoterol (SYMBICORT) 160-4.5 mcg/actuation inhaler, Inhale 2 puffs 2 (two) times a day., Disp: 1 Inhaler, Rfl: 2     CARTIA  mg 24 hr capsule, TAKE ONE CAPSULE BY MOUTH DAILY, Disp: 90 capsule, Rfl: 1     DAILY-FAYE tablet, TAKE 1 TABLET BY MOUTH EVERY DAY, Disp: 90 tablet, Rfl: 0     furosemide (LASIX) 20 MG tablet, TAKE 1 TABLET(20 MG) BY MOUTH DAILY, Disp: 90 tablet, Rfl: 3     gabapentin (NEURONTIN) 300 MG capsule, 1 tablet po qam, 2 tablet po qhs, Disp: 270 capsule, Rfl: 1     glipiZIDE (GLUCOTROL XL) 10 MG 24 hr tablet, TAKE 1 TABLET(10 MG) BY MOUTH DAILY, Disp: 90 tablet, Rfl: 1     insulin aspart (NOVOLOG FLEXPEN) 100 unit/mL injection pen, Sliding scale QID (meals/bedtime): -180, 2 units; -220, 4 u; -260, 6 u; -300, 8 u; -340, 10 u; BG >340, 12 u, Disp: 5 " Pre-filled Pen Syringe, Rfl: 0     ipratropium-albuterol (DUO-NEB) 0.5-2.5 mg/3 mL nebulizer, INHALE 1 VIAL IN NEBULIZER EVERY 4 HOURS AS NEEDED, Disp: 1440 mL, Rfl: 0     leg brace (ELASTIC KNEE SUPPORT) Misc, Use 1 each As Directed daily., Disp: , Rfl: 0     loperamide (IMODIUM) 2 mg capsule, Take 2 capsules by mouth initially followed by 1 capsule after each loose stool bowel movement. Do not exceed 8 capsules per day (Patient taking differently: Take 2-4 mg by mouth see administration instructions. Take 2 capsules by mouth initially followed by 1 capsule after each loose stool bowel movement. Do not exceed 8 capsules per day), Disp: 30 capsule, Rfl: 1     meclizine (ANTIVERT) 25 mg tablet, TAKE 1 TABLET(25 MG) BY MOUTH THREE TIMES DAILY AS NEEDED FOR DIZZINESS OR NAUSEA, Disp: 30 tablet, Rfl: 5     metFORMIN (GLUCOPHAGE) 500 MG tablet, Take 1 tablet (500 mg total) by mouth 2 (two) times a day with meals., Disp: 60 tablet, Rfl: 6     MULTIVITAMIN WITH MINERALS (HAIR,SKIN AND NAILS ORAL), Take 3 tablets by mouth daily. Chewable formulation., Disp: , Rfl:      NEBULIZER/COMPRESSOR (NEBULIZER AND COMPRESSOR MISC), Use As Directed. Use UTD, Disp: , Rfl:      nicotine (NICODERM CQ) 21 mg/24 hr, APPLY ONE PATCH ON THE SKIN EVERY DAY, Disp: 28 patch, Rfl: 5     nystatin (NYSTOP) powder, Apply 1 application topically 2 (two) times a day as needed. 2-3 times to affected area(s)., Disp: 15 g, Rfl: 3     olopatadine (PATANOL) 0.1 % ophthalmic solution, Administer 1 drop to both eyes 2 (two) times a day as needed for allergies. , Disp: , Rfl:      omeprazole (PRILOSEC) 20 MG capsule, TAKE 1 CAPSULE(20 MG) BY MOUTH TWICE DAILY BEFORE MEALS, Disp: 60 capsule, Rfl: 11     omeprazole (PRILOSEC) 20 MG capsule, TAKE 1 CAPSULE(20 MG) BY MOUTH TWICE DAILY BEFORE MEALS, Disp: 180 capsule, Rfl: 4     ONETOUCH ULTRA TEST strips, USE 1 EACH AS DIRECTED, Disp: 200 strip, Rfl: 2     oxyCODONE (ROXICODONE) 10 mg immediate release tablet,  "Take 1 tablet (10 mg total) by mouth every 6 (six) hours as needed., Disp: 120 tablet, Rfl: 0     polymyxin B-trimethoprim (FOR POLYTRIM) 10,000 unit- 1 mg/mL Drop ophthalmic drops, 1 drop in affected eye q 3 hours while awake, Disp: 10 mL, Rfl: 0     predniSONE (DELTASONE) 10 mg tablet, 6 tabs po daily x 2 d, 5 tabs po daily x 2 d, 4 tabs po daily x 2 d,  3 tabs po daily x 2 d,  2 tabs po daily x 2 d,  1 tab po daily x 2 d, Disp: 42 tablet, Rfl: 0     ranitidine (ZANTAC) 150 MG tablet, Take 1 tablet (150 mg total) by mouth 2 (two) times a day., Disp: 180 tablet, Rfl: 2     UNABLE TO FIND, Take 1 capsule by mouth daily. Med Name: Probiotic with green tea., Disp: , Rfl:      warfarin (COUMADIN) 5 MG tablet, TAKE 1 TABLET(5 MG) BY MOUTH DAILY, Disp: 90 tablet, Rfl: 0     warfarin (COUMADIN) 5 MG tablet, Take 1/2 tablet by mouth on Tuesday and Friday, and 1 tablet all other days or as directed based on inr, Disp: 90 tablet, Rfl: 1    Current Facility-Administered Medications:      lidocaine 10 mg/mL (1 %) injection 10 mL, 10 mL, Intradermal, Once, Cammy Bui MD     lidocaine 10 mg/mL (1 %) injection 10 mL, 10 mL, Intradermal, Q28 Days, Cammy Bui MD, 10 mL at 11/17/17 1149    Allergies:  Allergies   Allergen Reactions     Celebrex [Celecoxib] Rash     patient had butterfly rash - \"lupus-like\"       Latex Rash       ROS:  Pertinent positives as noted in HPI; otherwise 12 point ROS negative.      Physical Exam:  EXAM:  /60 (Patient Site: Right Arm, Patient Position: Sitting, Cuff Size: Adult Large)  Pulse 91  Temp 98.2  F (36.8  C) (Oral)   Resp 16  SpO2 99%   Gen:  NAD, appears well, well-hydrated  HEENT:  TMs nl, oropharynx benign, nasal mucosa nl, conjunctiva clear  Neck:  Supple, no adenopathy, no thyromegaly, no carotid bruits, no JVD  Lungs:  Clear to auscultation bilaterally  Cor:  RRR no murmur  Abd:  Soft, nontender, BS+, no masses, no guarding or rebound, no " HSM  Extr:  Neg.; trigger points, R>L with multiple trigger points in suprascapular, rhomboidal  Neuro:  No asymmetry  Skin:  Warm/dry        Results:  Results for orders placed or performed in visit on 02/19/18   INR   Result Value Ref Range    INR 1.80 (H) 0.90 - 1.10           Procedure Note - Trigger Point Injections    Consent obtained: verbal    Indication:  Fibromyalgia - trigger point pain    5 trigger points identified.  Each trigger point swabbed x 3 with Betadine swab.  Each trigger point then injected with 2 ml of 1% Lidocaine (no epi).    Total volume injected:  10 ml    Complications:  None, patient tolerated procedure well.    Plan:  Discussed care with patient.  RTC for further injections in 30 days prn.

## 2021-06-16 NOTE — TELEPHONE ENCOUNTER
Dr. Brizuela, Medication refill requested. Please authorize medication if appropriate. Thank you.

## 2021-06-16 NOTE — TELEPHONE ENCOUNTER
Telephone Encounter by Mena Dumont RN at 3/16/2020 12:17 PM     Author: Mena Dumont RN Service: -- Author Type: Registered Nurse    Filed: 3/16/2020 12:25 PM Encounter Date: 3/16/2020 Status: Attested    : Mena Dumont RN (Registered Nurse) Cosigner: Cammy Bui MD at 3/16/2020  3:07 PM    Attestation signed by Cammy Bui MD at 3/16/2020  3:07 PM    agree                ANTICOAGULATION  MANAGEMENT    Assessment     Today's INR result of 3.8 is Supratherapeutic (goal INR of 2.0-3.0)        Warfarin taken as previously instructed    No new diet changes affecting INR    No new medication/supplements affecting INR    Continues to tolerate warfarin with no reported s/s of bleeding or thromboembolism     Previous INR was Therapeutic    Plan:     Spoke with Mary Kay regarding INR result and instructed:     Warfarin Dosing Instructions:  hold dose today then change warfarin dose to 2.5 mg daily on Sundays, Tuesdays and Thursdays; and 5 mg daily rest of week  (8.3 % change)    Instructed patient to follow up no later than: 1-2 weeks. If would start the medications given at office visit today she will call. Know will need to recheck INR after she does.     Education provided: importance of therapeutic range, importance of following up for INR monitoring at instructed interval and importance of taking warfarin as instructed    Mary Kay verbalizes understanding and agrees to warfarin dosing plan.    Instructed to call the [facility] for any changes, questions or concerns. (#[phone])   ?   Mena Dumont RN    Subjective/Objective:      Mary Kay Tejada, a 70 y.o. female is on warfarin.     Mary Kay reports:     Home warfarin dose: verbally confirmed home dose with Mary Kay and updated on anticoagulation calendar     Missed doses: No     Medication changes:  No-she was given cifdnir and prednisone incase she needs it. She is not taking  it.     S/S of bleeding or thromboembolism:  No     New Injury or illness:  No     Changes in diet or alcohol consumption:  No     Upcoming surgery, procedure or cardioversion:  No    Anticoagulation Episode Summary     Current INR goal:   2.0-3.0   TTR:   41.1 % (1 y)   Next INR check:   3/30/2020   INR from last check:   3.80! (3/16/2020)   Weekly max warfarin dose:      Target end date:   Indefinite   INR check location:      Preferred lab:      Send INR reminders to:   [facility]    Indications    Paroxysmal Atrial Fibrillation [I48.91]           Comments:            Anticoagulation Care Providers     Provider Role Specialty Phone number    Cammy Bui MD Referring Family Medicine [phone]

## 2021-06-16 NOTE — TELEPHONE ENCOUNTER
Per [facility] pharmacy, Plan does not cover this medication CYCLOBENZAPRINE 10MG TABLETS. Please initiate PA

## 2021-06-17 ENCOUNTER — COMMUNICATION - HEALTHEAST (OUTPATIENT)
Dept: ANTICOAGULATION | Facility: CLINIC | Age: 71
End: 2021-06-17

## 2021-06-17 NOTE — PROGRESS NOTES
"ASSESSMENT/PLAN:  1. Trigger point of thoracic region     2. Atrial fibrillation  INR   3. Chronic pain syndrome  oxyCODONE (ROXICODONE) 10 mg immediate release tablet       This is a 67 yo female with:  1.  Fibromyalgia/trigger point pain - she has done well with previous trigger point injections.  This decreases the amount of opiates she uses for pain control.  See procedure note.  As always, her pain is worse on right (compared to left) posterior shoulder/thoracic region - 5 trigger points were injected today.    2.  Chronic pain syndrome - takes Oxycodone - no increase in medication quantity/frequency of fills.  OK to continue monthly refill.    3.  Atrial Fibrillation - on lifelong anticoagulation - takes warfarin.  INR done - anticoagulation team is following.    Desires portable oxygen device - will try to contact her \"worker\".        Medications Discontinued During This Encounter   Medication Reason     glipiZIDE (GLUCOTROL XL) 10 MG 24 hr tablet Therapy completed     oxyCODONE (ROXICODONE) 10 mg immediate release tablet Reorder     There are no Patient Instructions on file for this visit.    Chief Complaint:  Chief Complaint   Patient presents with     Injections     trigger point injection       HPI:   Mary Kay Tejada is a 68 y.o. female c/o  Uses nocturnal oxygen all the time  Uses daytime oxygen when \"short of breath\"  Wants a portable oxygen generator - small portable device to wear while out and about  Currently gets oxygen through [facility] - they do not have this particular device (they have a 10 hour device)    Patient saw advertisement on TV  Called someone  Talked to her worker - care management - [place]?; from Medicare?  Yanet Alexandra:  [phone]    Got Glipizide refill from pharmacy - but hasn't been taking this for a long time  A1c is 6.4 in February -     Due for pain meds -   Has preop 5/15/18 for other eye -         PMH:   Patient Active Problem List    Diagnosis Date " Noted     Cataract 11/06/2017     Bunion 07/19/2017     Callus of foot 07/19/2017     Moderate aortic stenosis 05/23/2017     COPD (chronic obstructive pulmonary disease) 05/23/2017     DM neuropathy, type II diabetes mellitus 05/19/2017     Chronic obstructive pulmonary disease with acute lower respiratory infection 12/24/2016     Gastroenteritis 12/24/2016     Syncope 12/22/2016     Opacity of lung on imaging study 12/22/2016     Acute gastritis 12/22/2016     Osteoarthritis of both knees 08/22/2016     Osteoarthritis, hip, bilateral 06/20/2016     Primary osteoarthritis of left knee 06/20/2016     Candidal intertrigo 05/11/2016     Type 2 diabetes mellitus with hypoglycemia      Aspiration pneumonia 03/01/2016     Controlled substance agreement signed 11/23/2015     Cervical neck pain with evidence of disc disease 07/22/2015     Headache 07/17/2015     Hematoma of abdominal wall 07/05/2015     Skin lesion of face 05/22/2015     Tendonitis of wrist, right 04/22/2015     Menopause 04/06/2015     Flashers or floaters of right eye 02/23/2015     Tendonitis of wrist, left 01/21/2015     Trigger point of thoracic region 01/21/2015     Generalized osteoarthrosis, involving multiple sites 01/14/2015     Vertigo 12/17/2014     Rotator cuff tendonitis 12/17/2014     Abnormal Weight Loss      Osteoarthritis Of The Shoulder      Chronic Constipation      Onychomycosis Of The Toenails      Diarrhea      Ganglion Of The Left Wrist      Larynx Edema      Obesity      Malignant Vulvar Neoplasm      Medial Epicondylitis      Skin Lesion      Urinary Incontinence      Chronic Major Depression      Dry Eye Syndrome      Nicotine Dependence      Hypokalemia      Essential hypertension      Muscle Cramps In The Calf      Edema      Atrial flutter      Lump In / On The Skin      Type 2 diabetes mellitus with hyperglycemia      Chronic pain syndrome      Paroxysmal Atrial Fibrillation      Fibromyalgia      Nausea      Abdominal Pain       Peptic Ulcer      COPD exacerbation      Mixed hyperlipidemia      Chronic Reflux Esophagitis      Benign Adenomatous Polyp Of The Large Intestine      Past Medical History:   Diagnosis Date     Aortic stenosis      Atrial fibrillation      Atrial flutter      Benign neoplasm of adenomatous polyp     large intestine      Chronic constipation      Chronic pain syndrome      COPD (chronic obstructive pulmonary disease)     Oxygen at night      Depression      Diabetes mellitus      Dry eye syndrome      Fibromyalgia      Ganglion     left wrist     GERD (gastroesophageal reflux disease)      Hyperlipidemia      Hypertension      Hypokalemia      Larynx edema      Lung disease      Medial epicondylitis      Onychomycosis      Osteoarthritis      Otitis Externa     Created by Conversion      Peptic ulcer      Type 2 diabetes mellitus      Vulvar malignant neoplasm      Past Surgical History:   Procedure Laterality Date     BREAST BIOPSY Right      BREAST BIOPSY Right 01/28/2015     BREAST BIOPSY Right 1/28/2015    Procedure: RIGHT BREAST BIOPSY AFTER WIRE LOCALIZATION AT 0940;  Surgeon: Renée Soriano MD;  Location: [facility] [place] [facility];  Service:      HYSTERECTOMY       FL ABLATE HEART DYSRHYTHM FOCUS      Description: Catheter Ablation Atrial Fibrillation;  Recorded: 07/31/2012;  Comments: 7/24/12 PVI with Dr. Gardiner and nilay to all 5 pulm veins and CTI fl ablation line as well.     FL BIOPSY OF BREAST, INCISIONAL      Description: Incisional Breast Biopsy;  Recorded: 11/13/2007;  Comments: benign     FL SUPRACERV ABD HYSTERECTOMY      Description: Supracervical Hysterectomy;  Proc Date: 01/01/1985;  Comments: some cervix left!; ovaries intact; done for bleeding     FL TOTAL ABDOM HYSTERECTOMY      Description: Total Abdominal Hysterectomy;  Recorded: 12/31/2013;     FL TOTAL KNEE ARTHROPLASTY      Description: Total Knee Arthroplasty;  Recorded: 11/13/2007;     Social History     Social History     Marital status:  "     Spouse name: N/A     Number of children: N/A     Years of education: N/A     Occupational History     Not on file.     Social History Main Topics     Smoking status: Light Tobacco Smoker     Types: Cigarettes     Last attempt to quit: 1/1/2014     Smokeless tobacco: Never Used      Comment: E-cig some day     Alcohol use Yes      Comment: very little     Drug use: No     Sexual activity: Not on file     Other Topics Concern     Not on file     Social History Narrative       Meds:    Current Outpatient Prescriptions:      ACCU-CHEK SOFTCLIX LANCETS lancets, TEST THREE TIMES DAILY, Disp: 200 each, Rfl: 2     albuterol (PROVENTIL) 2.5 mg /3 mL (0.083 %) nebulizer solution, Take 3 mL (2.5 mg total) by nebulization every 4 (four) hours as needed for wheezing or shortness of breath., Disp: 75 mL, Rfl: 12     albuterol (VENTOLIN HFA) 90 mcg/actuation inhaler, INHALE 1 TO 2 PUFFS BY MOUTH EVERY 4 TO 6 HOURS AS NEEDED, Disp: 18 g, Rfl: 6     atorvastatin (LIPITOR) 20 MG tablet, TAKE 1 TABLET(20 MG) BY MOUTH AT BEDTIME, Disp: 90 tablet, Rfl: 0     BD INSULIN PEN NEEDLE UF SHORT 31 gauge x 5/16\" Ndle, TEST FOUR TIMES DAILY WITH MEALS AND AT BEDTIME, Disp: 300 each, Rfl: 2     blood glucose meter (GLUCOMETER), Use 1 each As Directed as needed. Dispense glucometer brand per patient's insurance at pharmacy discretion., Disp: 1 each, Rfl: 0     blood glucose test (ACCU-CHEK PEDRO PLUS TEST STRP) strips, Use 1 test strip to test 3 times daily. Dispense brand per patient's insurance at pharmacy discretion., Disp: 100 each, Rfl: 11     budesonide-formoterol (SYMBICORT) 160-4.5 mcg/actuation inhaler, Inhale 2 puffs 2 (two) times a day., Disp: 1 Inhaler, Rfl: 2     CARTIA  mg 24 hr capsule, TAKE 1 CAPSULE BY MOUTH DAILY, Disp: 90 capsule, Rfl: 0     furosemide (LASIX) 20 MG tablet, TAKE 1 TABLET(20 MG) BY MOUTH DAILY, Disp: 90 tablet, Rfl: 3     gabapentin (NEURONTIN) 300 MG capsule, 1 tablet po qam, 2 tablet po qhs, " Disp: 270 capsule, Rfl: 1     insulin aspart (NOVOLOG FLEXPEN) 100 unit/mL injection pen, Sliding scale QID (meals/bedtime): -180, 2 units; -220, 4 u; -260, 6 u; -300, 8 u; -340, 10 u; BG >340, 12 u, Disp: 5 Pre-filled Pen Syringe, Rfl: 0     ipratropium-albuterol (DUO-NEB) 0.5-2.5 mg/3 mL nebulizer, INHALE 1 VIAL IN NEBULIZER EVERY 4 HOURS AS NEEDED, Disp: 1440 mL, Rfl: 0     leg brace (ELASTIC KNEE SUPPORT) Misc, Use 1 each As Directed daily., Disp: , Rfl: 0     loperamide (IMODIUM) 2 mg capsule, Take 2 capsules by mouth initially followed by 1 capsule after each loose stool bowel movement. Do not exceed 8 capsules per day (Patient taking differently: Take 2-4 mg by mouth see administration instructions. Take 2 capsules by mouth initially followed by 1 capsule after each loose stool bowel movement. Do not exceed 8 capsules per day), Disp: 30 capsule, Rfl: 1     meclizine (ANTIVERT) 25 mg tablet, TAKE 1 TABLET(25 MG) BY MOUTH THREE TIMES DAILY AS NEEDED FOR DIZZINESS OR NAUSEA, Disp: 30 tablet, Rfl: 5     metFORMIN (GLUCOPHAGE) 500 MG tablet, TAKE 1 TABLET(500 MG) BY MOUTH TWICE DAILY WITH MEALS, Disp: 60 tablet, Rfl: 0     multivitamin (DAILY-FAYE) per tablet, Take 1 tablet by mouth daily., Disp: 90 tablet, Rfl: 3     MULTIVITAMIN WITH MINERALS (HAIR,SKIN AND NAILS ORAL), Take 3 tablets by mouth daily. Chewable formulation., Disp: , Rfl:      NEBULIZER/COMPRESSOR (NEBULIZER AND COMPRESSOR MISC), Use As Directed. Use UTD, Disp: , Rfl:      nystatin (NYSTOP) powder, Apply 1 application topically 2 (two) times a day as needed. 2-3 times to affected area(s)., Disp: 15 g, Rfl: 3     omeprazole (PRILOSEC) 20 MG capsule, TAKE 1 CAPSULE(20 MG) BY MOUTH TWICE DAILY BEFORE MEALS, Disp: 60 capsule, Rfl: 11     ONETOUCH ULTRA TEST strips, USE 1 EACH AS DIRECTED, Disp: 200 strip, Rfl: 2     oxyCODONE (ROXICODONE) 10 mg immediate release tablet, Take 1 tablet (10 mg total) by mouth every 6 (six)  hours as needed., Disp: 120 tablet, Rfl: 0     ranitidine (ZANTAC) 150 MG tablet, Take 1 tablet (150 mg total) by mouth 2 (two) times a day., Disp: 180 tablet, Rfl: 2     UNABLE TO FIND, Take 1 capsule by mouth daily. Med Name: Probiotic with green tea., Disp: , Rfl:      warfarin (COUMADIN) 5 MG tablet, TAKE 1 TABLET(5 MG) BY MOUTH DAILY, Disp: 90 tablet, Rfl: 0     warfarin (COUMADIN) 5 MG tablet, Take 1/2 tablet by mouth on Tuesday and Friday, and 1 tablet all other days or as directed based on inr, Disp: 90 tablet, Rfl: 1     moxifloxacin (VIGAMOX) 0.5 % ophthalmic solution, INT 1 GTT IN OD FOUR TIMES DAILY. START 1 DAY B SURGERY AND U UNTIL BOTTLE IS FINISHED, Disp: , Rfl: 1     nicotine (NICODERM CQ) 21 mg/24 hr, APPLY ONE PATCH ON THE SKIN EVERY DAY, Disp: 28 patch, Rfl: 5     olopatadine (PATANOL) 0.1 % ophthalmic solution, Administer 1 drop to both eyes 2 (two) times a day as needed for allergies. , Disp: , Rfl:      omeprazole (PRILOSEC) 20 MG capsule, TAKE 1 CAPSULE(20 MG) BY MOUTH TWICE DAILY BEFORE MEALS, Disp: 180 capsule, Rfl: 4     polymyxin B-trimethoprim (FOR POLYTRIM) 10,000 unit- 1 mg/mL Drop ophthalmic drops, 1 drop in affected eye q 3 hours while awake, Disp: 10 mL, Rfl: 0     prednisoLONE acetate (PRED-FORTE) 1 % ophthalmic suspension, SHAKE LQ AND INT 1 GTT IN OD FOUR TIMES DAILY. START 1 DAY B SURGERY AND U UNTIL INSTRUCTED TO DISCONTINUE, Disp: , Rfl: 1     predniSONE (DELTASONE) 10 mg tablet, 6 tabs po daily x 2 d, 5 tabs po daily x 2 d, 4 tabs po daily x 2 d,  3 tabs po daily x 2 d,  2 tabs po daily x 2 d,  1 tab po daily x 2 d, Disp: 42 tablet, Rfl: 0    Current Facility-Administered Medications:      lidocaine 10 mg/mL (1 %) injection 10 mL, 10 mL, Intradermal, Once, Cammy Bui MD     lidocaine 10 mg/mL (1 %) injection 10 mL, 10 mL, Intradermal, Q28 Days, Cammy Bui MD, 10 mL at 03/16/18 1123    Allergies:  Allergies   Allergen Reactions     Celebrex  "[Celecoxib] Rash     patient had butterfly rash - \"lupus-like\"       Latex Rash       ROS:  Pertinent positives as noted in HPI; otherwise 12 point ROS negative.      Physical Exam:  EXAM:  /50 (Patient Site: Right Arm, Patient Position: Sitting, Cuff Size: Adult Large)  Pulse 86  Temp 98.3  F (36.8  C) (Oral)   Resp 16  Wt 202 lb (91.6 kg)  SpO2 95%  BMI 34.67 kg/m2   Gen:  NAD, appears well, well-hydrated  HEENT:  TMs nl, oropharynx benign, nasal mucosa nl, conjunctiva clear  Neck:  Supple, no adenopathy, no thyromegaly, no carotid bruits, no JVD  Lungs:  Clear to auscultation bilaterally  Cor:  RRR no murmur  Abd:  Soft, nontender, BS+, no masses, no guarding or rebound, no HSM  Back:  Trigger point tenderness identified on posterior right scapula/shoulder  Extr:  Neg.  Neuro:  No asymmetry  Skin:  Warm/dry        Results:  Results for orders placed or performed in visit on 04/16/18   INR   Result Value Ref Range    INR 2.60 (H) 0.90 - 1.10       Procedure Note - Trigger Point Injections    Consent obtained: verbal    Indication:  Fibromyalgia/trigger point pain    5 trigger points identified.  Each trigger point swabbed x 3 with Betadine swab.  Each trigger point then injected with 2 ml of 1% Lidocaine (no epi).    Total volume injected:  10 ml    Complications:  None, patient tolerated procedure well.    Plan:  Discussed care with patient.  RTC for further injections in 30 days prn.          "

## 2021-06-17 NOTE — PROGRESS NOTES
[facility] Care Coordination    Returned member's call, not available and left voice message.    Samantha Alexandra RN, PHN   [facility]  [phone]

## 2021-06-17 NOTE — TELEPHONE ENCOUNTER
"Telephone Encounter by Yolis Hdz RN at 5/25/2021 10:40 AM     Author: Yolis Hdz RN Service: -- Author Type: Registered Nurse    Filed: 5/25/2021 10:43 AM Encounter Date: 5/24/2021 Status: Signed    : Yolis Hdz RN (Registered Nurse)       ANTICOAGULATION  MANAGEMENT    Assessment     Yesterday's INR result of 3.3 is Supratherapeutic (goal INR of 2.0-3.0)        Missed dose(s) may be affecting INR    Decreased greens/vitamin K intake may be affecting INR    No new medication/supplements affecting INR     Received COVID-19 vaccine #2 on 5/21 and reports being \"out of it\" for a couple days and not eating like normal    Continues to tolerate warfarin with no reported s/s of bleeding or thromboembolism     Previous INR was Therapeutic    Plan:     Spoke on phone with Mary Kay regarding INR result and instructed:      Warfarin Dosing Instructions:  Continue current warfarin dose 5 mg daily  (0 % change)    Instructed patient to follow up no later than: 2 weeks    Education provided: target INR goal and significance of current INR result, importance of notifying clinic for changes in medications, monitoring for bleeding signs and symptoms and when to seek medical attention/emergency care    Mary Kay verbalizes understanding and agrees to warfarin dosing plan.    Instructed to call the [facility] for any changes, questions or concerns. (#[phone])   ?   Yolis Hdz RN    Subjective/Objective:      Mary Kay Tejada, a 71 y.o. female is on warfarin.       Mary Kay reports:     Home warfarin dose: as updated on anticoagulation calendar per template     Missed doses: Yes-- Saturday     Medication changes:  No     S/S of bleeding or thromboembolism:  No     New Injury or illness:  No-- did have second COVID shot and was not feeling well over the weekend     Changes in diet or alcohol consumption:  Yes-- decreased greens     Upcoming surgery, procedure or cardioversion:  " No    Anticoagulation Episode Summary     Current INR goal:  2.0-3.0   TTR:  55.0 % (1 y)   Next INR check:  6/7/2021   INR from last check:  3.30 (5/24/2021)   Weekly max warfarin dose:     Target end date:  Indefinite   INR check location:     Preferred lab:     Send INR reminders to:  [facility]    Indications    Paroxysmal Atrial Fibrillation [I48.91]           Comments:           Anticoagulation Care Providers     Provider Role Specialty Phone number    Cammy Bui MD Referring Family Medicine [phone]

## 2021-06-17 NOTE — TELEPHONE ENCOUNTER
Telephone Encounter by Marija Crawley RN at 2/24/2021 10:47 AM     Author: Marija Crawley RN Service: -- Author Type: Registered Nurse    Filed: 2/24/2021 10:52 AM Encounter Date: 2/24/2021 Status: Signed    : Marija Crawley RN (Registered Nurse)       ANTICOAGULATION  MANAGEMENT    Assessment     Today's INR result of 1.8 is Subtherapeutic (goal INR of 2.0-3.0)        Warfarin taken as previously instructed    No new diet changes affecting INR    No new medication/supplements affecting INR    Continues to tolerate warfarin with no reported s/s of bleeding or thromboembolism     Previous INR was Therapeutic    Plan:     Left detailed message for Mary Kay regarding INR result and instructed:      Warfarin Dosing Instructions:  boost today Wed 2/24 take 7.5mg then continue current warfarin dose 5 mg daily     Instructed patient to follow up no later than: 2 weeks    Education provided: target INR goal and significance of current INR result        Instructed to call the [facility] for any changes, questions or concerns. (#[phone])   ?   Marija Crawley RN    Subjective/Objective:      Mary Kay Tejada, a 71 y.o. female is on warfarin. Mary Kay Muñiz reports:     Home warfarin dose: as updated on anticoagulation calendar per template     Missed doses: No     Medication changes:  No     S/S of bleeding or thromboembolism:  No     New Injury or illness:  No     Changes in diet or alcohol consumption:  No     Upcoming surgery, procedure or cardioversion:  No    Anticoagulation Episode Summary     Current INR goal:  2.0-3.0   TTR:  44.8 % (1 y)   Next INR check:  3/10/2021   INR from last check:  1.80 (2/24/2021)   Weekly max warfarin dose:     Target end date:  Indefinite   INR check location:     Preferred lab:     Send INR reminders to:  [facility]    Indications    Paroxysmal Atrial Fibrillation [I48.91]           Comments:           Anticoagulation Care Providers      Provider Role Specialty Phone number    Cammy Bui MD Referring Family Medicine [phone]

## 2021-06-17 NOTE — TELEPHONE ENCOUNTER
Refill Approved    Rx renewed per Medication Renewal Policy. Medication was last renewed on 4/21/20, last OV 4/26/21.    Floridalma Chinchilla, [facility] Triage/Med Refill 4/30/2021     Requested Prescriptions   Pending Prescriptions Disp Refills     famotidine (PEPCID) 20 MG tablet [Pharmacy Med Name: FAMOTIDINE 20MG TABLETS] 180 tablet 3     Sig: TAKE ONE TABLET BY MOUTH TWICE DAILY       GI Medications Refill Protocol Passed - 4/29/2021  8:05 PM        Passed - PCP or prescribing provider visit in last 12 or next 3 months.     Last office visit with prescriber/PCP: 4/26/2021 Cammy Bui MD OR same dept: 4/26/2021 Cammy Bui MD OR same specialty: 4/26/2021 Cammy Bui MD  Last physical: 8/17/2020 Last MTM visit: Visit date not found   Next visit within 3 mo: Visit date not found  Next physical within 3 mo: Visit date not found  Prescriber OR PCP: Cammy Bui MD  Last diagnosis associated with med order: 1. Peptic ulcer  - famotidine (PEPCID) 20 MG tablet [Pharmacy Med Name: FAMOTIDINE 20MG TABLETS]; TAKE ONE TABLET BY MOUTH TWICE DAILY  Dispense: 180 tablet; Refill: 3    If protocol passes may refill for 12 months if within 3 months of last provider visit (or a total of 15 months).

## 2021-06-17 NOTE — TELEPHONE ENCOUNTER
Telephone Encounter by Marija Crawley RN at 10/19/2020 10:01 AM     Author: Marija Crawley RN Service: -- Author Type: Registered Nurse    Filed: 10/19/2020 10:21 AM Encounter Date: 10/19/2020 Status: Signed    : Marija Crawley RN (Registered Nurse)       ANTICOAGULATION  MANAGEMENT    Assessment     Today's INR result of 1.8 is Subtherapeutic (goal INR of 2.0-3.0)        Warfarin taken as previously instructed    No new diet changes affecting INR    No new medication/supplements affecting INR    Continues to tolerate warfarin with no reported s/s of bleeding or thromboembolism     Previous INR was Subtherapeutic    Plan:     Left detailed message for Mary Kay regarding INR result and instructed:     Warfarin Dosing Instructions:  Change warfarin dose to 2.5 mg daily on Sun; and 5 mg daily rest of week  (8.3 % change)    Instructed patient to follow up no later than: 2 weeks    Education provided: target INR goal and significance of current INR result and importance of following up for INR monitoring at instructed interval        Instructed to call the [facility] for any changes, questions or concerns. (#[phone])   ?   Marija Crawley RN    Subjective/Objective:      Mary Kay S Terrell, a 70 y.o. female is on warfarin.     Mary Kay reports:     Home warfarin dose: as updated on anticoagulation calendar per template     Missed doses: No     Medication changes:  No     S/S of bleeding or thromboembolism:  No     New Injury or illness:  No     Changes in diet or alcohol consumption:  No     Upcoming surgery, procedure or cardioversion:  No    Anticoagulation Episode Summary     Current INR goal:  2.0-3.0   TTR:  26.4 % (1 y)   Next INR check:  11/2/2020   INR from last check:  1.80 (10/19/2020)   Weekly max warfarin dose:     Target end date:  Indefinite   INR check location:     Preferred lab:     Send INR reminders to:  [facility]    Indications    Paroxysmal Atrial  Fibrillation [I48.91]           Comments:           Anticoagulation Care Providers     Provider Role Specialty Phone number    Cammy Bui MD Referring Family Medicine [phone]

## 2021-06-17 NOTE — PROGRESS NOTES
[facility] Care Coordination    Submitted application for [employer] with [facility].    Prashant Vargas  Care Management Specialist  [facility]  [phone]

## 2021-06-17 NOTE — PROGRESS NOTES
ASSESSMENT/PLAN:  1. Fibromyalgia     2. Trigger point of thoracic region     3. Chronic pain syndrome  oxyCODONE (ROXICODONE) 10 mg immediate release tablet   4. Nicotine Dependence  nicotine (NICODERM CQ) 21 mg/24 hr   5. Atrial fibrillation, unspecified type (H)         This is a 72 yo female here for:  1.  Fibromyalgia - has gotten trigger point injections in paracervical/upper thoracic musculature - due to pain syndrome - this helps avoid increasing opioid needs (patient has had GI bleeding which eliminates use of NSAIDs).  See procedure note.   2.  Chronic pain syndrome - as above - will add Oxycodone  3.  Nicotine dependence - has tried to quit in past - has done well with patches in past - will start Nicotine patches at 21 mg, given her quantity of cigarettes/day  4.  Atrial fibrillation - on warfarin - due for INR today -     Return in about 1 month (around 5/26/2021) for Recheck.      Medications Discontinued During This Encounter   Medication Reason     nicotine (NICODERM CQ) 21 mg/24 hr Reorder     oxyCODONE (ROXICODONE) 10 mg immediate release tablet Reorder     There are no Patient Instructions on file for this visit.    Chief Complaint:  Chief Complaint   Patient presents with     Trigger point shots     Wrist Pain     LT wrist pain, she uses her band but that isn't helping.        HPI:   Mary Kay Tejada is a 71 y.o. female c/o  1.  Here for trigger point injections  2.  Left wrist pain - still having pain despite using splint - admits she doesn't use her splint all the time -   3.  Still trying to find a COVID vaccine    PMH:   Patient Active Problem List    Diagnosis Date Noted     Chronic gastric ulcer without hemorrhage and without perforation 10/19/2020     Advanced directives, counseling/discussion 08/17/2020     Primary osteoarthritis of both knees 01/17/2020     Mixed stress and urge urinary incontinence 08/16/2019     Skin lesion 07/17/2019     Dyslipidemia      Upper GI bleed 12/13/2018      Melena 12/13/2018     Anemia due to blood loss, acute 07/18/2018     Diabetic polyneuropathy associated with type 2 diabetes mellitus (H) 07/18/2018     Chronic anemia 06/27/2018     Cataract 11/06/2017     Bunion, left 07/19/2017     Callus of foot 07/19/2017     Nonrheumatic aortic valve stenosis 05/23/2017     COPD (chronic obstructive pulmonary disease) (H) 05/23/2017     Chronic obstructive pulmonary disease with acute lower respiratory infection (H) 12/24/2016     Gastroenteritis 12/24/2016     Syncope 12/22/2016     Opacity of lung on imaging study 12/22/2016     Acute gastritis 12/22/2016     Osteoarthritis of both knees 08/22/2016     Osteoarthritis, hip, bilateral 06/20/2016     Primary osteoarthritis of left knee 06/20/2016     Candidal intertrigo 05/11/2016     Type 2 diabetes mellitus with hypoglycemia (H)      Aspiration pneumonia (H) 03/01/2016     Controlled substance agreement signed 11/23/2015     Cervical neck pain with evidence of disc disease 07/22/2015     Headache 07/17/2015     Hematoma of abdominal wall 07/05/2015     Skin lesion of face 05/22/2015     Tendonitis of wrist, right 04/22/2015     Menopause 04/06/2015     Flashers or floaters of right eye 02/23/2015     Tendonitis of wrist, left 01/21/2015     Trigger point of thoracic region 01/21/2015     Generalized osteoarthrosis, involving multiple sites 01/14/2015     Vertigo 12/17/2014     Rotator cuff tendonitis 12/17/2014     Abnormal Weight Loss      Osteoarthritis Of The Shoulder      Chronic Constipation      Onychomycosis Of The Toenails      Diarrhea      Ganglion Of The Left Wrist      Larynx Edema      Obesity      Medial Epicondylitis      Skin Lesion      Urinary Incontinence      Chronic Major Depression      Dry Eye Syndrome      Nicotine Dependence      Hypokalemia      Essential hypertension      Muscle Cramps In The Calf      Edema      Atrial flutter (H)      Lump In / On The Skin      Chronic pain syndrome       Paroxysmal Atrial Fibrillation      Fibromyalgia      Nausea      Abdominal Pain      Peptic Ulcer      COPD exacerbation (H)      Mixed hyperlipidemia      Chronic Reflux Esophagitis      Benign Adenomatous Polyp Of The Large Intestine      Past Medical History:   Diagnosis Date     Anemia      Aortic stenosis      Atrial fibrillation (H)      Atrial flutter (H)      Benign neoplasm of adenomatous polyp     large intestine      Chronic constipation      Chronic pain syndrome      COPD (chronic obstructive pulmonary disease) (H)     Oxygen at night      Dependence on supplemental oxygen     Oxygen at noc, during the day as needed     Depression      Diabetes mellitus (H)      Dry eye syndrome      Fibromyalgia      Ganglion     left wrist     GERD (gastroesophageal reflux disease)      Hyperlipidemia      Hypertension      Hypokalemia      Larynx edema      Lung disease      Malignant Vulvar Neoplasm     Created by Conversion [facility] Annotation: Apr 17 2007  8:24AM - Cammy Bui:  resection per Dr. Alfonso Mane 9/06;  Replacement Utility updated for latest IMO load     Medial epicondylitis      Onychomycosis      Osteoarthritis      Otitis Externa     Created by Conversion      Peptic ulcer      Polyneuropathy      Vulvar malignant neoplasm (H)      Past Surgical History:   Procedure Laterality Date     BREAST BIOPSY Right      BREAST BIOPSY Right 01/28/2015     BREAST BIOPSY Right 1/28/2015    Procedure: RIGHT BREAST BIOPSY AFTER WIRE LOCALIZATION AT 0940;  Surgeon: Renée Soriano MD;  Location: [facility];  Service:      COLONOSCOPY N/A 6/14/2019    Procedure: COLONOSCOPY;  Surgeon: Eduardo Mora MD;  Location: [facility];  Service: Gastroenterology     ESOPHAGOGASTRODUODENOSCOPY N/A 11/6/2018    Procedure: ESOPHAGOGASTRODUODENOSCOPY;  Surgeon: Lit Fernando MD;  Location: [facility];  Service:      HYSTERECTOMY       JOINT REPLACEMENT Left     TKA     RI ABLATE  HEART DYSRHYTHM FOCUS      Description: Catheter Ablation Atrial Fibrillation;  Recorded: 07/31/2012;  Comments: 7/24/12 PVI with Dr. Gardiner and nilay to all 5 pulm veins and CTI fl ablation line as well.     MO BIOPSY OF BREAST, INCISIONAL      Description: Incisional Breast Biopsy;  Recorded: 11/13/2007;  Comments: benign     MO SUPRACERV ABD HYSTERECTOMY      Description: Supracervical Hysterectomy;  Proc Date: 01/01/1985;  Comments: some cervix left!; ovaries intact; done for bleeding     Social History     Socioeconomic History     Marital status:      Spouse name: Not on file     Number of children: Not on file     Years of education: Not on file     Highest education level: Not on file   Occupational History     Not on file   Social Needs     Financial resource strain: Not on file     Food insecurity     Worry: Not on file     Inability: Not on file     Transportation needs     Medical: Not on file     Non-medical: Not on file   Tobacco Use     Smoking status: Current Every Day Smoker     Packs/day: 0.50     Types: Cigarettes     Smokeless tobacco: Never Used   Substance and Sexual Activity     Alcohol use: Yes     Comment: very little     Drug use: No     Sexual activity: Not on file   Lifestyle     Physical activity     Days per week: Not on file     Minutes per session: Not on file     Stress: Not on file   Relationships     Social connections     Talks on phone: Not on file     Gets together: Not on file     Attends Rastafari service: Not on file     Active member of club or organization: Not on file     Attends meetings of clubs or organizations: Not on file     Relationship status: Not on file     Intimate partner violence     Fear of current or ex partner: Not on file     Emotionally abused: Not on file     Physically abused: Not on file     Forced sexual activity: Not on file   Other Topics Concern     Not on file   Social History Narrative     Not on file     Family History   Problem Relation  "Age of Onset     Heart failure Mother      Cancer Other         paternal HX-laryngeal      Alcoholism Sister      No Medical Problems Daughter      No Medical Problems Maternal Grandmother      No Medical Problems Maternal Grandfather      No Medical Problems Paternal Grandmother      No Medical Problems Paternal Grandfather      No Medical Problems Maternal Aunt      No Medical Problems Paternal Aunt      Alcoholism Sister      Alcoholism Brother      Alcohol abuse Father      Cancer Paternal Uncle         Gastric-Alcohol     Cancer Paternal Uncle         gastric-Alcohol     BRCA 1/2 Neg Hx      Breast cancer Neg Hx      Colon cancer Neg Hx      Endometrial cancer Neg Hx      Ovarian cancer Neg Hx        Meds:    Current Outpatient Medications:      ACCU-CHEK SOFTCLIX LANCETS lancets, TEST THREE TIMES DAILY, Disp: 300 each, Rfl: 3     albuterol (PROAIR HFA;PROVENTIL HFA;VENTOLIN HFA) 90 mcg/actuation inhaler, INHALE 2 PUFFS EVERY 4 HOURS AS NEEDED WHEEZING, Disp: 90 g, Rfl: 11     albuterol (PROVENTIL) 2.5 mg /3 mL (0.083 %) nebulizer solution, Take 3 mL (2.5 mg total) by nebulization every 4 (four) hours as needed for wheezing or shortness of breath., Disp: 75 mL, Rfl: 12     atorvastatin (LIPITOR) 20 MG tablet, TAKE 1 TABLET(20 MG) BY MOUTH AT BEDTIME, Disp: 90 tablet, Rfl: 3     BD ULTRA-FINE SHORT PEN NEEDLE 31 gauge x 5/16\" Ndle, TEST FOUR TIMES DAILY WITH MEALS AND AT BEDTIME, Disp: 400 each, Rfl: 3     blood-glucose meter (ONETOUCH VERIO IQ METER) Misc, Check blood sugar three times a day., Disp: 1 each, Rfl: 0     budesonide-formoteroL (SYMBICORT) 160-4.5 mcg/actuation inhaler, Inhale 2 puffs 2 (two) times a day. Inhale 2 puff by mouth twice daily, Disp: 1 Inhaler, Rfl: 2     cyclobenzaprine (FLEXERIL) 10 MG tablet, TAKE 1 TABLET(10 MG) BY MOUTH AT BEDTIME AS NEEDED FOR MUSCLE SPASMS, Disp: 30 tablet, Rfl: 0     diaper,brief,adult,disposable (ADULT BRIEFS - LARGE) Misc, Use 3-4 daily as needed for " incontinence, Disp: 120 each, Rfl: 6     diltiazem (CARDIZEM CD) 120 MG 24 hr capsule, Take 1 capsule (120 mg total) by mouth daily., Disp: 90 capsule, Rfl: 1     furosemide (LASIX) 20 MG tablet, TAKE 1 TABLET(20 MG) BY MOUTH DAILY AS NEEDED, Disp: 90 tablet, Rfl: 3     gabapentin (NEURONTIN) 600 MG tablet, TAKE 1 TABLET(600 MG) BY MOUTH THREE TIMES DAILY, Disp: 270 tablet, Rfl: 3     generic lancets (FINGERSTIX LANCETS), Dispense brand per patient's insurance at pharmacy discretion., Disp: 300 each, Rfl: 0     ipratropium-albuterol (DUO-NEB) 0.5-2.5 mg/3 mL nebulizer, INAHALE 1 VIAL IN NEBULIZER EVERY 4 HOURS AS NEEDED, Disp: 1440 mL, Rfl: 0     meclizine (ANTIVERT) 25 mg tablet, Take 1 tablet (25 mg total) by mouth 3 (three) times a day as needed., Disp: 45 tablet, Rfl: 5     metFORMIN (GLUCOPHAGE) 500 MG tablet, TAKE 1 TABLET(500 MG) BY MOUTH TWICE DAILY WITH MEALS, Disp: 180 tablet, Rfl: 3     mometasone-formoterol (DULERA) 200-5 mcg/actuation HFAA inhaler, Inhale 2 puffs 2 (two) times a day., Disp: 1 Inhaler, Rfl: 5     nicotine (NICODERM CQ) 21 mg/24 hr, Place 1 patch on the skin daily., Disp: 30 patch, Rfl: 1     nystatin (NYSTOP) powder, Apply 1 application topically 2 (two) times a day as needed. 2-3 times to affected area(s)., Disp: 60 g, Rfl: 3     omeprazole (PRILOSEC) 20 MG capsule, TAKE 1 CAPSULE(20 MG) BY MOUTH DAILY BEFORE BREAKFAST, Disp: 30 capsule, Rfl: 3     ONETOUCH VERIO TEST STRIPS strips, USE 1 EACH AS DIRECTED THREE TIMES DAILY AS NEEDED, Disp: 300 strip, Rfl: 3     oxyCODONE (ROXICODONE) 10 mg immediate release tablet, Take 1 tablet (10 mg total) by mouth every 6 (six) hours as needed., Disp: 120 tablet, Rfl: 0     polyethylene glycol (MIRALAX) 17 gram packet, Take 1 packet (17 g total) by mouth daily. (Patient taking differently: Take 17 g by mouth daily as needed.    ), Disp: , Rfl: 0     sucralfate (CARAFATE) 1 gram tablet, TAKE 1 TABLET BY MOUTH FOUR TIMES DAILY(CRUSH TABLET AND MIX IT  "WITH A LITTLE WATER, THEN SWALLOW), Disp: 120 tablet, Rfl: 12     warfarin ANTICOAGULANT (COUMADIN/JANTOVEN) 5 MG tablet, TAKE 1/2 TO 1 TABLET BY MOUTH DAILY AS DIRECTED, Disp: 90 tablet, Rfl: 1     famotidine (PEPCID) 20 MG tablet, Take 1 tablet (20 mg total) by mouth 2 (two) times a day., Disp: 180 tablet, Rfl: 3    Current Facility-Administered Medications:      lidocaine 10 mg/mL (1 %) injection 10 mL, 10 mL, Other, Once, Cammy Bui MD     lidocaine 10 mg/mL (1 %) injection 10 mL, 10 mL, Other, Once, Cammy Bui MD    Allergies:  Allergies   Allergen Reactions     Celebrex [Celecoxib] Rash     patient had butterfly rash - \"lupus-like\"       Latex Rash       ROS:  Pertinent positives as noted in HPI; otherwise 12 point ROS negative.      Physical Exam:  EXAM:  /57 (Patient Site: Right Arm, Patient Position: Sitting, Cuff Size: Adult Regular)   Pulse 76   Temp 97.7  F (36.5  C) (Oral)   Wt 151 lb (68.5 kg)   BMI 25.92 kg/m     Gen:  NAD, appears chronically ill, well-hydrated  HEENT:  TMs nl, oropharynx benign, nasal mucosa nl, conjunctiva clear  Neck:  Supple, no adenopathy, no thyromegaly, no carotid bruits, no JVD  Lungs:  Clear to auscultation bilaterally  Cor:  RRR no murmur  Abd:  Soft, nontender, BS+, no masses, no guarding or rebound, no HSM  Back:  Trigger points - mostly lower cervical paraspinal muscles as well as upper thoracic/suprascapular muscles -   Extr:  Neg.  Neuro:  No asymmetry  Skin:  Warm/dry        Results:  Results for orders placed or performed in visit on 03/24/21   INR   Result Value Ref Range    INR 2.90 (H) 0.90 - 1.10     Procedure Note - Trigger Point Injections    Consent obtained: verbal    Indication:  fibromyalgia    6 trigger points identified.  Each trigger point swabbed x 3 with Betadine swab.  Each trigger point then injected with 1.7  ml of 1% Lidocaine (no epi).    Total volume injected:  10 ml    Complications:  None, patient " tolerated procedure well.    Plan:  Discussed care with patient.  RTC for further injections in 30 days prn.

## 2021-06-17 NOTE — TELEPHONE ENCOUNTER
Telephone Encounter by Mena Dumont RN at 11/11/2020  1:12 PM     Author: Mena Dumont RN Service: -- Author Type: Registered Nurse    Filed: 11/11/2020  1:16 PM Encounter Date: 11/11/2020 Status: Signed    : Mena Dumont RN (Registered Nurse)       ANTICOAGULATION  MANAGEMENT    Assessment     Today's INR result of 1.3 is Subtherapeutic (goal INR of 2.0-3.0)        Warfarin taken as previously instructed    stomach pain may be affecting diet and INR    She has self increased her dose of Sucrafate    Continues to tolerate warfarin with reported s/s of bleeding or thromboembolism she had some dark stools last week.    Previous INR was Subtherapeutic    Plan:     Spoke on phone with Mary Kay regarding INR result and instructed:     Warfarin Dosing Instructions:  Take 7.5 mg today then change warfarin dose to 5 mg daily  (7.7 % change)    Instructed patient to follow up no later than: 5-7 days She will try if she can get a ride.     Education provided: importance of consistent vitamin K intake, impact of vitamin K foods on INR, importance of therapeutic range, target INR goal and significance of current INR result, importance of following up for INR monitoring at instructed interval, importance of taking warfarin as instructed, monitoring for clotting signs and symptoms and when to seek medical attention/emergency care    Mary Kay verbalizes understanding and agrees to warfarin dosing plan.    Instructed to call the [facility] for any changes, questions or concerns. (#[phone])   ?   Mena Dumont RN    Subjective/Objective:      Mary Kay Tejada, a 70 y.o. female is on warfarin.     Mary Kay reports:     Home warfarin dose: verbally confirmed home dose with Mary Kay and updated on anticoagulation calendar     Missed doses: No     Medication changes:  Yes: she self increased her Carafate. She will now resume her regular dose per MD instructions today at office.       S/S of bleeding or thromboembolism:  Yes: has some dark stool last week for a few days. No longer an issue.     New Injury or illness:  Yes: abdominal pain. She seen MD today.     Changes in diet or alcohol consumption:  Yes: she is not eating well.      Upcoming surgery, procedure or cardioversion:  No    Anticoagulation Episode Summary     Current INR goal:  2.0-3.0   TTR:  26.4 % (1 y)   Next INR check:  11/18/2020   INR from last check:  1.30 (11/11/2020)   Weekly max warfarin dose:     Target end date:  Indefinite   INR check location:     Preferred lab:     Send INR reminders to:  [facility]    Indications    Paroxysmal Atrial Fibrillation [I48.91]           Comments:           Anticoagulation Care Providers     Provider Role Specialty Phone number    Cammy Bui MD Referring Family Medicine [phone]

## 2021-06-17 NOTE — TELEPHONE ENCOUNTER
ANTICOAGULATION  MANAGEMENT    Assessment     Today's INR result of 2.5 is Therapeutic (goal INR of 2.0-3.0)        Warfarin taken as previously instructed    No new diet changes affecting INR    No new medication/supplements affecting INR    Continues to tolerate warfarin with no reported s/s of bleeding or thromboembolism     Previous INR was Therapeutic    Plan:     Spoke on phone with Mary Kay regarding INR result and instructed:      Warfarin Dosing Instructions:  Continue current warfarin dose 5 mg daily   (0 % change)    Instructed patient to follow up no later than: 4 weeks.    Education provided: importance of therapeutic range and target INR goal and significance of current INR result    Mary Kay verbalizes understanding and agrees to warfarin dosing plan.    Instructed to call the [facility] for any changes, questions or concerns. (#[phone])   ?   Mena Dumont RN    Subjective/Objective:      Mary Kay Tejada, a 71 y.o. female is on warfarin. Mary Kay      Mary Kay reports:     Home warfarin dose: verbally confirmed home dose with Mary Kay and updated on anticoagulation calendar     Missed doses: No     Medication changes:  No     S/S of bleeding or thromboembolism:  No     New Injury or illness:  No     Changes in diet or alcohol consumption:  No     Upcoming surgery, procedure or cardioversion:  No    Anticoagulation Episode Summary     Current INR goal:  2.0-3.0   TTR:  53.7 % (1 y)   Next INR check:  5/24/2021   INR from last check:  2.50 (4/26/2021)   Weekly max warfarin dose:     Target end date:  Indefinite   INR check location:     Preferred lab:     Send INR reminders to:  [facility]    Indications    Paroxysmal Atrial Fibrillation [I48.91]           Comments:           Anticoagulation Care Providers     Provider Role Specialty Phone number    Cammy Bui MD Referring Family Medicine [phone]

## 2021-06-17 NOTE — TELEPHONE ENCOUNTER
RN cannot approve Refill Request    RN can NOT refill this medication med is not covered by policy/route to provider. Last office visit: 4/26/2021 Cammy Bui MD Last Physical: 8/17/2020 Last MTM visit: Visit date not found Last visit same specialty: 4/26/2021 Cammy Bui MD.  Next visit within 3 mo: Visit date not found  Next physical within 3 mo: Visit date not found      Floridalma Chinchilla, [facility] Triage/Med Refill 5/23/2021    Requested Prescriptions   Pending Prescriptions Disp Refills     warfarin ANTICOAGULANT (COUMADIN/JANTOVEN) 5 MG tablet [Pharmacy Med Name: WARFARIN SOD 5MG TABLETS (PEACH)] 90 tablet 1     Sig: TAKE 1/2 TO 1 TABLET BY MOUTH DAILY AS DIRECTED       Warfarin Refill Protocol  Failed - 5/23/2021  5:53 AM        Failed -  Route to appropriate pool/provider     Last Anticoagulation Summary:   Anticoagulation Episode Summary     Current INR goal:  2.0-3.0   TTR:  54.7 % (11.3 mo)   Next INR check:  5/24/2021   INR from last check:  2.50 (4/26/2021)   Weekly max warfarin dose:     Target end date:  Indefinite   INR check location:     Preferred lab:     Send INR reminders to:  [facility]    Indications    Paroxysmal Atrial Fibrillation [I48.91]           Comments:           Anticoagulation Care Providers     Provider Role Specialty Phone number    Cammy Bui MD Referring Family Medicine [phone]                Passed - Provider visit in last year     Last office visit with prescriber/PCP: 4/26/2021 Cammy Bui MD OR same dept: 4/26/2021 Cammy Bui MD OR same specialty: 4/26/2021 Cammy Bui MD  Last physical: 8/17/2020 Last MTM visit: Visit date not found    Next appt within 3 mo: Visit date not found Next physical within 3 mo: Visit date not found  Prescriber OR PCP: Cammy Bui MD  Last diagnosis associated with med order: 1. Paroxysmal atrial  fibrillation (H)  - warfarin ANTICOAGULANT (COUMADIN/JANTOVEN) 5 MG tablet [Pharmacy Med Name: WARFARIN SOD 5MG TABLETS (PEACH)]; TAKE 1/2 TO 1 TABLET BY MOUTH DAILY AS DIRECTED  Dispense: 90 tablet; Refill: 1    If protocol passes may refill for 6 months if within 3 months of last provider visit (or a total of 9 months).

## 2021-06-17 NOTE — TELEPHONE ENCOUNTER
Anticoagulation Management    Unable to reach Mary Kay today.    Today's INR result of 3.3 is Supratherapeutic (goal INR of 2.0-3.0).     Follow up required to discuss out of range INR .    Left message to take reduced dose of warfarin, 2.5 mg tonight.       ACN to follow up    Sanam Wei RN

## 2021-06-17 NOTE — TELEPHONE ENCOUNTER
RN cannot approve Refill Request    RN can NOT refill this medication last filled date 10/2019.  Too long since last fill.  Provider input needed. Last office visit: 4/26/2021 Cammy Bui MD Last Physical: 8/17/2020 Last MTM visit: Visit date not found Last visit same specialty: 4/26/2021 Cammy Bui MD.  Next visit within 3 mo: Visit date not found  Next physical within 3 mo: Visit date not found      Quincy Dang, [facility] Triage/Med Refill 5/2/2021    Requested Prescriptions   Pending Prescriptions Disp Refills     albuterol (PROAIR HFA;PROVENTIL HFA;VENTOLIN HFA) 90 mcg/actuation inhaler 90 g 11     Sig: INHALE 2 PUFFS EVERY 4 HOURS AS NEEDED WHEEZING       Albuterol/Levalbuterol Refill Protocol Passed - 4/30/2021  2:45 PM        Passed - PCP or prescribing provider visit in last year     Last office visit with prescriber/PCP: 4/26/2021 Cammy Bui MD OR same dept: 4/26/2021 Cammy Bui MD OR same specialty: 4/26/2021 Cammy Bui MD Last physical: 8/17/2020       Next appt within 3 mo: Visit date not found  Next physical within 3 mo: Visit date not found  Prescriber OR PCP: Cammy Bui MD  Last diagnosis associated with med order: 1. Chronic obstructive pulmonary disease with acute lower respiratory infection (H)  - albuterol (PROAIR HFA;PROVENTIL HFA;VENTOLIN HFA) 90 mcg/actuation inhaler; INHALE 2 PUFFS EVERY 4 HOURS AS NEEDED WHEEZING  Dispense: 90 g; Refill: 11    If protocol passes may refill for 6 months if within 3 months of last provider visit (or a total of 9 months). If patient requesting >1 inhaler per month refill x 6 months and have patient make appointment with provider.

## 2021-06-18 NOTE — PROGRESS NOTES
"ASSESSMENT/PLAN:  1. Trigger point of thoracic region  lidocaine 10 mg/mL (1 %) injection 10 mL   2. Skin lesion  Ambulatory referral to Dermatology   3. Chronic pain syndrome  oxyCODONE (ROXICODONE) 10 mg immediate release tablet   4. Edema  furosemide (LASIX) 20 MG tablet   5. Atrial fibrillation (H)  INR       This is a 67 yo female with:  1.  Trigger Point Pain due to fibromyalgia - trigger point injections provided - see procedure note  2.  Skin lesion - this is on her right cheek - given her previous history of skin cancer, it is reasonable to have Derm review this.  If we could avoid a biopsy on the face, it would be nice.   3.  Chronic Pain Syndrome - trigger point injections done today for fibromyalgia.  She also uses Oxycodone to avoid NSAIDs due to previous ulcer history.  She has not had any escalation in her dosing for years.  4.  Lower extremity edema - refill Lasix  5.  Atrial Fib - on chronic anticoagulation therapy - check INR - followed by anticoagulation team      Administrations This Visit     lidocaine 10 mg/mL (1 %) injection 10 mL     Admin Date Action Dose Route Administered By             06/15/2018 Given 10 mL Other Marcos Lovett CMA                          Medications Discontinued During This Encounter   Medication Reason     oxyCODONE (ROXICODONE) 10 mg immediate release tablet Reorder     furosemide (LASIX) 20 MG tablet Reorder     There are no Patient Instructions on file for this visit.    Chief Complaint:  Chief Complaint   Patient presents with     Injections     Trigger point injections     INR Check       HPI:   Mary Kay Tejada is a 68 y.o. female c/o  Had cataract surgery - went well - \" I can see in both \"  Using cheaters    Next thing is the heart doctor  Has right cheek lesion - h/o skin cancer on nose    PMH:   Patient Active Problem List    Diagnosis Date Noted     Cataract 11/06/2017     Bunion 07/19/2017     Callus of foot 07/19/2017     Moderate aortic stenosis " 05/23/2017     COPD (chronic obstructive pulmonary disease) (H) 05/23/2017     DM neuropathy, type II diabetes mellitus (H) 05/19/2017     Chronic obstructive pulmonary disease with acute lower respiratory infection (H) 12/24/2016     Gastroenteritis 12/24/2016     Syncope 12/22/2016     Opacity of lung on imaging study 12/22/2016     Acute gastritis 12/22/2016     Osteoarthritis of both knees 08/22/2016     Osteoarthritis, hip, bilateral 06/20/2016     Primary osteoarthritis of left knee 06/20/2016     Candidal intertrigo 05/11/2016     Type 2 diabetes mellitus with hypoglycemia (H)      Aspiration pneumonia (H) 03/01/2016     Controlled substance agreement signed 11/23/2015     Cervical neck pain with evidence of disc disease 07/22/2015     Headache 07/17/2015     Hematoma of abdominal wall 07/05/2015     Skin lesion of face 05/22/2015     Tendonitis of wrist, right 04/22/2015     Menopause 04/06/2015     Flashers or floaters of right eye 02/23/2015     Tendonitis of wrist, left 01/21/2015     Trigger point of thoracic region 01/21/2015     Generalized osteoarthrosis, involving multiple sites 01/14/2015     Vertigo 12/17/2014     Rotator cuff tendonitis 12/17/2014     Abnormal Weight Loss      Osteoarthritis Of The Shoulder      Chronic Constipation      Onychomycosis Of The Toenails      Diarrhea      Ganglion Of The Left Wrist      Larynx Edema      Obesity      Malignant Vulvar Neoplasm      Medial Epicondylitis      Skin Lesion      Urinary Incontinence      Chronic Major Depression      Dry Eye Syndrome      Nicotine Dependence      Hypokalemia      Essential hypertension      Muscle Cramps In The Calf      Edema      Atrial flutter (H)      Lump In / On The Skin      Type 2 diabetes mellitus with hyperglycemia (H)      Chronic pain syndrome      Paroxysmal Atrial Fibrillation      Fibromyalgia      Nausea      Abdominal Pain      Peptic Ulcer      COPD exacerbation (H)      Mixed hyperlipidemia      Chronic  Reflux Esophagitis      Benign Adenomatous Polyp Of The Large Intestine      Past Medical History:   Diagnosis Date     Aortic stenosis      Atrial fibrillation (H)      Atrial flutter (H)      Benign neoplasm of adenomatous polyp     large intestine      Chronic constipation      Chronic pain syndrome      COPD (chronic obstructive pulmonary disease) (H)     Oxygen at night      Depression      Diabetes mellitus (H)      Dry eye syndrome      Fibromyalgia      Ganglion     left wrist     GERD (gastroesophageal reflux disease)      Hyperlipidemia      Hypertension      Hypokalemia      Larynx edema      Lung disease      Medial epicondylitis      Onychomycosis      Osteoarthritis      Otitis Externa     Created by Conversion      Peptic ulcer      Type 2 diabetes mellitus (H)      Vulvar malignant neoplasm (H)      Past Surgical History:   Procedure Laterality Date     BREAST BIOPSY Right      BREAST BIOPSY Right 01/28/2015     BREAST BIOPSY Right 1/28/2015    Procedure: RIGHT BREAST BIOPSY AFTER WIRE LOCALIZATION AT 0940;  Surgeon: Renée Soriano [place];  [place] [facility] [place]:      [place]STERECTOMY       OK ABLATE HEART DYSRHYTHM FOCUS      Description: Catheter Ablation Atrial Fibrillation;  Recorded: 07/31/2012;  Comments: 7/24/12 PVI with Dr. Gardiner and nilay to all 5 pulm veins and CTI fl ablation line as well.     OK BIOPSY OF BREAST, INCISIONAL      Description: Incisional Breast Biopsy;  Recorded: 11/13/2007;  Comments: benign     OK SUPRACERV ABD HYSTERECTOMY      Description: Supracervical Hysterectomy;  Proc Date: 01/01/1985;  Comments: some cervix left!; ovaries intact; done for bleeding     OK TOTAL ABDOM HYSTERECTOMY      Description: Total Abdominal Hysterectomy;  Recorded: 12/31/2013;     OK TOTAL KNEE ARTHROPLASTY      Description: Total Knee Arthroplasty;  Recorded: 11/13/2007;     Social History     Social History     Marital status:      Spouse name: N/A     Number of children:  "N/A     Years of education: N/A     Occupational History     Not on file.     Social History Main Topics     Smoking status: Light Tobacco Smoker     Types: Cigarettes     Last attempt to quit: 1/1/2014     Smokeless tobacco: Never Used      Comment: E-cig some day     Alcohol use Yes      Comment: very little     Drug use: No     Sexual activity: Not on file     Other Topics Concern     Not on file     Social History Narrative       Meds:    Current Outpatient Prescriptions:      ACCU-CHEK SOFTCLIX LANCETS lancets, TEST THREE TIMES DAILY, Disp: 200 each, Rfl: 2     albuterol (PROVENTIL) 2.5 mg /3 mL (0.083 %) nebulizer solution, Take 3 mL (2.5 mg total) by nebulization every 4 (four) hours as needed for wheezing or shortness of breath., Disp: 75 mL, Rfl: 12     atorvastatin (LIPITOR) 20 MG tablet, TAKE 1 TABLET(20 MG) BY MOUTH AT BEDTIME, Disp: 90 tablet, Rfl: 3     BD ULTRA-FINE SHORT PEN NEEDLE 31 gauge x 5/16\" Ndle, TEST FOUR TIMES DAILY WITH MEALS AND AT BEDTIME, Disp: 300 each, Rfl: 3     blood glucose meter (GLUCOMETER), Use 1 each As Directed as needed. Dispense glucometer brand per patient's insurance at pharmacy discretion., Disp: 1 each, Rfl: 0     blood glucose test (ACCU-CHEK PEDRO PLUS TEST STRP) strips, Use 1 test strip to test 3 times daily. Dispense brand per patient's insurance at pharmacy discretion., Disp: 100 each, Rfl: 11     budesonide-formoterol (SYMBICORT) 160-4.5 mcg/actuation inhaler, Inhale 2 puffs 2 (two) times a day., Disp: 1 Inhaler, Rfl: 2     CARTIA  mg 24 hr capsule, TAKE 1 CAPSULE BY MOUTH DAILY, Disp: 90 capsule, Rfl: 0     furosemide (LASIX) 20 MG tablet, Take 1 tablet (20 mg total) by mouth daily., Disp: 90 tablet, Rfl: 3     gabapentin (NEURONTIN) 300 MG capsule, 1 tablet po qam, 2 tablet po qhs, Disp: 270 capsule, Rfl: 1     insulin aspart U-100 (NOVOLOG FLEXPEN U-100 INSULIN) 100 unit/mL injection pen, Sliding scale QID (meals/bedtime): -180, 2 units; -220, " 4 u; -260, 6 u; -300, 8 u; -340, 10 u; BG >340, 12 u, Disp: 5 Pre-filled Pen Syringe, Rfl: 0     ipratropium-albuterol (DUO-NEB) 0.5-2.5 mg/3 mL nebulizer, INAHALE 1 VIAL IN NEBULIZER EVERY 4 HOURS AS NEEDED, Disp: 1440 mL, Rfl: 0     leg brace (ELASTIC KNEE SUPPORT) Misc, Use 1 each As Directed daily., Disp: , Rfl: 0     loperamide (IMODIUM) 2 mg capsule, Take 2 capsules by mouth initially followed by 1 capsule after each loose stool bowel movement. Do not exceed 8 capsules per day (Patient taking differently: Take 2-4 mg by mouth see administration instructions. Take 2 capsules by mouth initially followed by 1 capsule after each loose stool bowel movement. Do not exceed 8 capsules per day), Disp: 30 capsule, Rfl: 1     meclizine (ANTIVERT) 25 mg tablet, TAKE 1 TABLET(25 MG) BY MOUTH THREE TIMES DAILY AS NEEDED FOR DIZZINESS OR NAUSEA, Disp: 30 tablet, Rfl: 5     moxifloxacin (VIGAMOX) 0.5 % ophthalmic solution, INT 1 GTT IN OD FOUR TIMES DAILY. START 1 DAY B SURGERY AND U UNTIL BOTTLE IS FINISHED, Disp: , Rfl: 1     multivitamin (DAILY-FAYE) per tablet, Take 1 tablet by mouth daily., Disp: 90 tablet, Rfl: 3     multivitamin with minerals (HAIR,SKIN AND NAILS) tablet, Take 1 tablet by mouth daily. Chewable formulation., Disp: , Rfl: 0     NEBULIZER/COMPRESSOR (NEBULIZER AND COMPRESSOR MISC), Use As Directed. Use UTD, Disp: , Rfl:      nystatin (NYSTOP) powder, Apply 1 application topically 2 (two) times a day as needed. 2-3 times to affected area(s)., Disp: 15 g, Rfl: 3     olopatadine (PATANOL) 0.1 % ophthalmic solution, Administer 1 drop to both eyes 2 (two) times a day as needed for allergies. , Disp: , Rfl:      omeprazole (PRILOSEC) 20 MG capsule, TAKE 1 CAPSULE(20 MG) BY MOUTH TWICE DAILY BEFORE MEALS, Disp: 180 capsule, Rfl: 4     ONETOUCH ULTRA TEST strips, USE 1 EACH AS DIRECTED, Disp: 200 strip, Rfl: 2     oxyCODONE (ROXICODONE) 10 mg immediate release tablet, Take 1 tablet (10 mg total)  "by mouth every 6 (six) hours as needed., Disp: 120 tablet, Rfl: 0     polymyxin B-trimethoprim (FOR POLYTRIM) 10,000 unit- 1 mg/mL Drop ophthalmic drops, 1 drop in affected eye q 3 hours while awake, Disp: 10 mL, Rfl: 0     prednisoLONE acetate (PRED-FORTE) 1 % ophthalmic suspension, SHAKE LQ AND INT 1 GTT IN OD FOUR TIMES DAILY. START 1 DAY B SURGERY AND U UNTIL INSTRUCTED TO DISCONTINUE, Disp: , Rfl: 1     ranitidine (ZANTAC) 150 MG tablet, Take 1 tablet (150 mg total) by mouth 2 (two) times a day., Disp: 180 tablet, Rfl: 2     UNABLE TO FIND, Take 1 capsule by mouth daily. Med Name: Probiotic with green tea., Disp: , Rfl:      VENTOLIN HFA 90 mcg/actuation inhaler, INHALE 1 TO 2 PUFFS BY MOUTH EVERY 4 TO 6 HOURS AS NEEDED, Disp: 18 g, Rfl: 5     warfarin (COUMADIN) 5 MG tablet, Take 1/2 tablet by mouth on Tuesday and Friday, and 1 tablet all other days or as directed based on inr, Disp: 90 tablet, Rfl: 1     metFORMIN (GLUCOPHAGE) 500 MG tablet, TAKE 1 TABLET(500 MG) BY MOUTH TWICE DAILY WITH MEALS, Disp: 180 tablet, Rfl: 0    Current Facility-Administered Medications:      lidocaine 10 mg/mL (1 %) injection 10 mL, 10 mL, Intradermal, Once, Cammy Bui MD     lidocaine 10 mg/mL (1 %) injection 10 mL, 10 mL, Intradermal, Q28 Days, Cammy Bui MD, 10 mL at 03/16/18 1123    Allergies:  Allergies   Allergen Reactions     Celebrex [Celecoxib] Rash     patient had butterfly rash - \"lupus-like\"       Latex Rash       ROS:  Pertinent positives as noted in HPI; otherwise 12 point ROS negative.      Physical Exam:  EXAM:  /48 (Patient Site: Left Arm, Patient Position: Sitting, Cuff Size: Adult Regular)  Pulse 80  Temp 98  F (36.7  C) (Oral)   Resp 20  Wt 191 lb 5 oz (86.8 kg)  Breastfeeding? No  BMI 32.33 kg/m2   Gen:  NAD, appears well, well-hydrated  HEENT:  TMs nl, oropharynx benign, nasal mucosa nl, conjunctiva clear  Neck:  Supple, no adenopathy, no thyromegaly, no carotid " bruits, no JVD  Lungs:  Clear to auscultation bilaterally  Cor:  Irreg no murmur  Abd:  Soft, nontender, BS+, no masses, no guarding or rebound, no HSM  Back:  Tender trigger points along upper back/suprascapular region, R>L;   Extr:  DJD - knees  Neuro:  No asymmetry  Skin:  Warm/dry; lesion on right cheek - sl nodular, sl. scaly        Results:  Results for orders placed or performed in visit on 06/15/18   INR   Result Value Ref Range    INR 2.70 (H) 0.90 - 1.10       Procedure Note - Trigger Point Injections    Consent obtained: verbal    Indication:  Fibromyalgia trigger point pain    5 trigger points identified - all in right suprascapular, rhomboidal, paracervical region.  Each trigger point swabbed x 3 with Betadine swab.  Each trigger point then injected with 2 ml of 1% Lidocaine (no epi).    Total volume injected:  10 ml    Complications:  None, patient tolerated procedure well.    Plan:  Discussed care with patient.  RTC for further injections in 30 days prn.

## 2021-06-18 NOTE — PROGRESS NOTES
Preoperative Exam    Scheduled Procedure: Cataract Left eye  Surgery Date:  5/16/18  Surgery Location: [facility], [place], fax [phone]    Surgeon:  Dr. Talia Trujillo    Assessment/Plan:     1. Fibromyalgia     2. Preop examination     3. Cataract     4. Chronic pain syndrome  oxyCODONE (ROXICODONE) 10 mg immediate release tablet   5. Paroxysmal atrial fibrillation     6. COPD (chronic obstructive pulmonary disease)     7. Atrial fibrillation  INR       This is a 67 yo female with declining vision, left eye, consistent with cataracts.  She has had right cataract repair, now due for left cataract repair.  OK for surgery as planned.  (Low risk surgery, high risk patient).      Patient also here for fibromyalgia pain - does trigger point injections to avoid increasing pain medications.  Shots given today - see procedure note.       Surgical Procedure Risk: Low (reported cardiac risk generally < 1%)  Have you had prior anesthesia?: Yes  Have you or any family members had a previous anesthesia reaction:  No  Do you or any family members have a history of a clotting or bleeding disorder?: Yes: on chronic anticoagulation  Cardiac Risk Assessment: no increased risk for major cardiac complications    Patient approved for surgery with general or local anesthesia.    Please Note:  Patient is taking medications for Chronic Pain.    Functional Status: Independent  Patient plans to recover at home with family.     Subjective:      Mary Kay Tejada is a 68 y.o. female who presents for a preoperative consultation.      Here for cataracts - left eye   Had right eye cataract repair previously - went well    C/o pain in left lower leg - pain from ankle to groin - variable -   No injury     All other systems reviewed and are negative, other than those listed in the HPI.    Pertinent History  Do you have difficulty breathing or chest pain after walking up a flight of stairs: No  History of obstructive sleep apnea:  "No  Steroid use in the last 6 months: Yes: prednisone for COPD exacerbation  Frequent Aspirin/NSAID use: No  Prior Blood Transfusion: No  Prior Blood Transfusion Reaction: No  If for some reason prior to, during or after the procedure, if it is medically indicated, would you be willing to have a blood transfusion?:  There is no transfusion refusal.    Current Outpatient Prescriptions   Medication Sig Dispense Refill     ACCU-CHEK SOFTCLIX LANCETS lancets TEST THREE TIMES DAILY 200 each 2     albuterol (PROVENTIL) 2.5 mg /3 mL (0.083 %) nebulizer solution Take 3 mL (2.5 mg total) by nebulization every 4 (four) hours as needed for wheezing or shortness of breath. 75 mL 12     albuterol (VENTOLIN HFA) 90 mcg/actuation inhaler INHALE 1 TO 2 PUFFS BY MOUTH EVERY 4 TO 6 HOURS AS NEEDED 18 g 6     atorvastatin (LIPITOR) 20 MG tablet TAKE 1 TABLET(20 MG) BY MOUTH AT BEDTIME 90 tablet 0     BD INSULIN PEN NEEDLE UF SHORT 31 gauge x 5/16\" Ndle TEST FOUR TIMES DAILY WITH MEALS AND AT BEDTIME 300 each 2     blood glucose meter (GLUCOMETER) Use 1 each As Directed as needed. Dispense glucometer brand per patient's insurance at pharmacy discretion. 1 each 0     blood glucose test (ACCU-CHEK PEDRO PLUS TEST STRP) strips Use 1 test strip to test 3 times daily. Dispense brand per patient's insurance at pharmacy discretion. 100 each 11     budesonide-formoterol (SYMBICORT) 160-4.5 mcg/actuation inhaler Inhale 2 puffs 2 (two) times a day. 1 Inhaler 2     CARTIA  mg 24 hr capsule TAKE 1 CAPSULE BY MOUTH DAILY 90 capsule 0     furosemide (LASIX) 20 MG tablet TAKE 1 TABLET(20 MG) BY MOUTH DAILY 90 tablet 3     gabapentin (NEURONTIN) 300 MG capsule 1 tablet po qam, 2 tablet po qhs 270 capsule 1     insulin aspart U-100 (NOVOLOG FLEXPEN U-100 INSULIN) 100 unit/mL injection pen Sliding scale QID (meals/bedtime): -180, 2 units; -220, 4 u; -260, 6 u; -300, 8 u; -340, 10 u; BG >340, 12 u 5 Pre-filled Pen " Syringe 0     ipratropium-albuterol (DUO-NEB) 0.5-2.5 mg/3 mL nebulizer INAHALE 1 VIAL IN NEBULIZER EVERY 4 HOURS AS NEEDED 1440 mL 0     leg brace (ELASTIC KNEE SUPPORT) Misc Use 1 each As Directed daily.  0     loperamide (IMODIUM) 2 mg capsule Take 2 capsules by mouth initially followed by 1 capsule after each loose stool bowel movement. Do not exceed 8 capsules per day (Patient taking differently: Take 2-4 mg by mouth see administration instructions. Take 2 capsules by mouth initially followed by 1 capsule after each loose stool bowel movement. Do not exceed 8 capsules per day) 30 capsule 1     meclizine (ANTIVERT) 25 mg tablet TAKE 1 TABLET(25 MG) BY MOUTH THREE TIMES DAILY AS NEEDED FOR DIZZINESS OR NAUSEA 30 tablet 5     metFORMIN (GLUCOPHAGE) 500 MG tablet TAKE 1 TABLET(500 MG) BY MOUTH TWICE DAILY WITH MEALS 60 tablet 0     moxifloxacin (VIGAMOX) 0.5 % ophthalmic solution INT 1 GTT IN OD FOUR TIMES DAILY. START 1 DAY B SURGERY AND U UNTIL BOTTLE IS FINISHED  1     multivitamin (DAILY-FAYE) per tablet Take 1 tablet by mouth daily. 90 tablet 3     multivitamin with minerals (HAIR,SKIN AND NAILS) tablet Take 1 tablet by mouth daily. Chewable formulation.  0     NEBULIZER/COMPRESSOR (NEBULIZER AND COMPRESSOR MISC) Use As Directed. Use UTD       nystatin (NYSTOP) powder Apply 1 application topically 2 (two) times a day as needed. 2-3 times to affected area(s). 15 g 3     olopatadine (PATANOL) 0.1 % ophthalmic solution Administer 1 drop to both eyes 2 (two) times a day as needed for allergies.        omeprazole (PRILOSEC) 20 MG capsule TAKE 1 CAPSULE(20 MG) BY MOUTH TWICE DAILY BEFORE MEALS 180 capsule 4     ONETOUCH ULTRA TEST strips USE 1 EACH AS DIRECTED 200 strip 2     oxyCODONE (ROXICODONE) 10 mg immediate release tablet Take 1 tablet (10 mg total) by mouth every 6 (six) hours as needed. 120 tablet 0     polymyxin B-trimethoprim (FOR POLYTRIM) 10,000 unit- 1 mg/mL Drop ophthalmic drops 1 drop in affected eye  "q 3 hours while awake 10 mL 0     prednisoLONE acetate (PRED-FORTE) 1 % ophthalmic suspension SHAKE LQ AND INT 1 GTT IN OD FOUR TIMES DAILY. START 1 DAY B SURGERY AND U UNTIL INSTRUCTED TO DISCONTINUE  1     ranitidine (ZANTAC) 150 MG tablet Take 1 tablet (150 mg total) by mouth 2 (two) times a day. 180 tablet 2     UNABLE TO FIND Take 1 capsule by mouth daily. Med Name: Probiotic with green tea.       warfarin (COUMADIN) 5 MG tablet Take 1/2 tablet by mouth on Tuesday and Friday, and 1 tablet all other days or as directed based on inr 90 tablet 1     Current Facility-Administered Medications   Medication Dose Route Frequency Provider Last Rate Last Dose     lidocaine 10 mg/mL (1 %) injection 10 mL  10 mL Intradermal Once Cammy Bui MD         lidocaine 10 mg/mL (1 %) injection 10 mL  10 mL Intradermal Q28 Days Cammy Bui MD   10 mL at 03/16/18 1123        Allergies   Allergen Reactions     Celebrex [Celecoxib] Rash     patient had butterfly rash - \"lupus-like\"       Latex Rash       Patient Active Problem List   Diagnosis     Abnormal Weight Loss     Osteoarthritis Of The Shoulder     Chronic Constipation     Onychomycosis Of The Toenails     Diarrhea     Ganglion Of The Left Wrist     Larynx Edema     Obesity     Malignant Vulvar Neoplasm     Medial Epicondylitis     Skin Lesion     Urinary Incontinence     Chronic Major Depression     Dry Eye Syndrome     Nicotine Dependence     Hypokalemia     Essential hypertension     Muscle Cramps In The Calf     Edema     Atrial flutter     Lump In / On The Skin     Type 2 diabetes mellitus with hyperglycemia     Chronic pain syndrome     Paroxysmal Atrial Fibrillation     Fibromyalgia     Nausea     Abdominal Pain     Peptic Ulcer     COPD exacerbation     Mixed hyperlipidemia     Chronic Reflux Esophagitis     Benign Adenomatous Polyp Of The Large Intestine     Vertigo     Rotator cuff tendonitis     Generalized osteoarthrosis, involving " multiple sites     Tendonitis of wrist, left     Trigger point of thoracic region     Flashers or floaters of right eye     Menopause     Tendonitis of wrist, right     Skin lesion of face     Hematoma of abdominal wall     Headache     Cervical neck pain with evidence of disc disease     Controlled substance agreement signed     Aspiration pneumonia     Type 2 diabetes mellitus with hypoglycemia     Candidal intertrigo     Osteoarthritis, hip, bilateral     Primary osteoarthritis of left knee     Osteoarthritis of both knees     Syncope     Opacity of lung on imaging study     Acute gastritis     Chronic obstructive pulmonary disease with acute lower respiratory infection     Gastroenteritis     DM neuropathy, type II diabetes mellitus     Moderate aortic stenosis     COPD (chronic obstructive pulmonary disease)     Bunion     Callus of foot     Cataract       Past Medical History:   Diagnosis Date     Aortic stenosis      Atrial fibrillation      Atrial flutter      Benign neoplasm of adenomatous polyp     large intestine      Chronic constipation      Chronic pain syndrome      COPD (chronic obstructive pulmonary disease)     Oxygen at night      Depression      Diabetes mellitus      Dry eye syndrome      Fibromyalgia      Ganglion     left wrist     GERD (gastroesophageal reflux disease)      Hyperlipidemia      Hypertension      Hypokalemia      Larynx edema      Lung disease      Medial epicondylitis      Onychomycosis      Osteoarthritis      Otitis Externa     Created by Conversion      Peptic ulcer      Type 2 diabetes mellitus      Vulvar malignant neoplasm        Past Surgical History:   Procedure Laterality Date     BREAST BIOPSY Right      BREAST BIOPSY Right 01/28/2015     BREAST BIOPSY Right 1/28/2015    Procedure: RIGHT BREAST BIOPSY AFTER WIRE LOCALIZATION AT 0940;  Surgeon: Renée Soriano MD;  Location: [facility] [place];  Service:      HYSTERECTOMY       DE ABLATE HEART DYSRHYTHM FOCUS       "Description: Catheter Ablation Atrial Fibrillation;  Recorded: 07/31/2012;  Comments: 7/24/12 PVI with Dr. Gardiner and nilay to all 5 pulm veins and CTI fl ablation line as well.     [place] BIOPSY OF BREAST, INCISIONAL      Description: Incisional Breast Biopsy;  Recorded: 11/13/2007;  Comments: benign     [place] SUPRACERV ABD HYSTERECTOMY      Description: Supracervical Hysterectomy;  Proc Date: 01/01/1985;  Comments: some cervix left!; ovaries intact; done for bleeding     KY TOTAL ABDOM HYSTERECTOMY      Description: Total Abdominal Hysterectomy;  Recorded: 12/31/2013;     [place] TOTAL KNEE ARTHROPLASTY      Description: Total Knee Arthroplasty;  Recorded: 11/13/2007;       Social History     Social History     Marital status:      Spouse name: N/A     Number of children: N/A     Years of education: N/A     Occupational History     Not on file.     Social History Main Topics     Smoking status: Light Tobacco Smoker     Types: Cigarettes     Last attempt to quit: 1/1/2014     Smokeless tobacco: Never Used      Comment: E-cig some day     Alcohol use Yes      Comment: very little     Drug use: No     Sexual activity: Not on file     Other Topics Concern     Not on file     Social History Narrative       Patient Care Team:  Cammy Bui MD as PCP - General          Objective:     Vitals:    05/15/18 0917   BP: 110/50   Pulse: 88   Resp: 20   Temp: 98.3  F (36.8  C)   TempSrc: Oral   Weight: 193 lb 2 oz (87.6 kg)   Height: 5' 4.5\" (1.638 m)         Physical Exam:  EXAM:  /50 (Patient Site: Right Arm, Patient Position: Sitting, Cuff Size: Adult Large)  Pulse 88  Temp 98.3  F (36.8  C) (Oral)   Resp 20  Ht 5' 4.5\" (1.638 m)  Wt 193 lb 2 oz (87.6 kg)  Breastfeeding? No  BMI 32.64 kg/m2   Gen:  NAD, appears well, well-hydrated  HEENT:  TMs nl, oropharynx benign, nasal mucosa nl, conjunctiva clear, cataract left eye;   Neck:  Supple, no adenopathy, no thyromegaly, no carotid bruits, no JVD  Lungs:  " Clear to auscultation bilaterally  Cor:  RRR no murmur  Abd:  Soft, nontender, BS+, no masses, no guarding or rebound, no HSM  Extr:  Neg., no calf swelling or tenderness  Neuro:  No asymmetry, Nl motor tone/strength, nl sensation, reflexes =, gait nl, nl coordination, CN intact,   Skin:  Warm/dry        There are no Patient Instructions on file for this visit.        Labs:  Results for orders placed or performed in visit on 05/15/18   INR   Result Value Ref Range    INR 1.70 (H) 0.90 - 1.10         Immunization History   Administered Date(s) Administered     DT (pediatric) 03/01/2004     Hep A, historic 03/11/2008, 08/03/2010     Influenza high dose, seasonal 09/21/2015, 10/19/2016, 10/20/2017     Influenza, inj, historic,unspecified 09/26/2007, 10/22/2008     Influenza, seasonal,quad inj 6-35 mos 09/25/2009, 09/10/2010, 10/05/2011, 09/14/2012, 09/14/2012, 09/20/2013, 09/19/2014     Pneumo Conj 13-V (2010&after) 07/22/2015     Pneumo Polysac 23-V 06/22/2010, 10/19/2016     Td,adult,historic,unspecified 03/11/2008     Tdap 03/11/2008     ZOSTER, LIVE 08/25/2012, 07/22/2015         Procedure Note - Trigger Point Injections    Consent obtained: verbal    Indication:  Fibromyalgia - trigger points in right suprascapular, posterior shoulder/neck    5 trigger points identified.  Each trigger point swabbed x 3 with Betadine swab.  Each trigger point then injected with 2 ml of 1% Lidocaine (no epi).    Total volume injected:  10 ml    Complications:  None, patient tolerated procedure well.    Plan:  Discussed care with patient.  RTC for further injections in 30 days prn.          Electronically signed by Cammy Bui MD 05/15/18 9:11 AM

## 2021-06-18 NOTE — PROGRESS NOTES
"ASSESSMENT/PLAN:  1. Black tarry stools  HM1(CBC and Differential)    Iron and Transferrin Iron Binding Capacity    HM1 (CBC with Diff)    Ambulatory Referral for Upper GI Endoscopy   2. Screen for colon cancer  Ambulatory referral for Colonoscopy   3. Atrial fibrillation (H)  INR   4. Iron deficiency anemia due to chronic blood loss  Ambulatory Referral for Upper GI Endoscopy    DISCONTINUED: sucralfate (CARAFATE) 1 gram tablet       This is a 69 yo female on chronic warfarin/anticoagulant therapy (for atrial fib), who presents with a several day history of black stools.  Etiology of this is unclear.  Patient is not symptomatic otherwise - vital signs are stable.  Patient has a stable hemoglobin - although appears to have chronic anemia.  I have tried to convince her to have EGD and colonoscopy.  She refuses hospitalization and refuses colonoscopy - would consider EGd only if \"necessary\".  She will return in 2 days for recheck on hemoglobin, but will need more urgent evaluation if more acute bleeding is noted.  We reviewed this at length.  We will start Carafate on top of other medications for now.          There are no discontinued medications.  Patient Instructions   You need to go to the emergency room if:  1.  Worse pain, dizzy, lightheaded, vomiting, blood in vomit, blood in stool, chest pain     I want to see you on Wednesday June 27 for recheck of blood work.         Chief Complaint:  Chief Complaint   Patient presents with     Melena     x3-4 days       HPI:   Mary Kay Tejada is a 68 y.o. female c/o  Black stools  Started with diarrhea on Thursday, then Friday dark black stools  Saturday - black stools  Sunday - no stool  Monday (today) - black stools    Has h/o gastric ulcer  Last INR was \"good\" - 5 mg daily    Lost 4 pounds after diarrhea    No dizzy or lightheaded          PMH:   Patient Active Problem List    Diagnosis Date Noted     Chronic anemia 06/27/2018     Cataract 11/06/2017     Bunion " 07/19/2017     Callus of foot 07/19/2017     Moderate aortic stenosis 05/23/2017     COPD (chronic obstructive pulmonary disease) (H) 05/23/2017     DM neuropathy, type II diabetes mellitus (H) 05/19/2017     Chronic obstructive pulmonary disease with acute lower respiratory infection (H) 12/24/2016     Gastroenteritis 12/24/2016     Syncope 12/22/2016     Opacity of lung on imaging study 12/22/2016     Acute gastritis 12/22/2016     Osteoarthritis of both knees 08/22/2016     Osteoarthritis, hip, bilateral 06/20/2016     Primary osteoarthritis of left knee 06/20/2016     Candidal intertrigo 05/11/2016     Type 2 diabetes mellitus with hypoglycemia (H)      Aspiration pneumonia (H) 03/01/2016     Controlled substance agreement signed 11/23/2015     Cervical neck pain with evidence of disc disease 07/22/2015     Headache 07/17/2015     Hematoma of abdominal wall 07/05/2015     Skin lesion of face 05/22/2015     Tendonitis of wrist, right 04/22/2015     Menopause 04/06/2015     Flashers or floaters of right eye 02/23/2015     Tendonitis of wrist, left 01/21/2015     Trigger point of thoracic region 01/21/2015     Generalized osteoarthrosis, involving multiple sites 01/14/2015     Vertigo 12/17/2014     Rotator cuff tendonitis 12/17/2014     Abnormal Weight Loss      Osteoarthritis Of The Shoulder      Chronic Constipation      Onychomycosis Of The Toenails      Diarrhea      Ganglion Of The Left Wrist      Larynx Edema      Obesity      Malignant Vulvar Neoplasm      Medial Epicondylitis      Skin Lesion      Urinary Incontinence      Chronic Major Depression      Dry Eye Syndrome      Nicotine Dependence      Hypokalemia      Essential hypertension      Muscle Cramps In The Calf      Edema      Atrial flutter (H)      Lump In / On The Skin      Type 2 diabetes mellitus with hyperglycemia (H)      Chronic pain syndrome      Paroxysmal Atrial Fibrillation      Fibromyalgia      Nausea      Abdominal Pain      Peptic  Ulcer      COPD exacerbation (H)      Mixed hyperlipidemia      Chronic Reflux Esophagitis      Benign Adenomatous Polyp Of The Large Intestine      Past Medical History:   Diagnosis Date     Aortic stenosis      Atrial fibrillation (H)      Atrial flutter (H)      Benign neoplasm of adenomatous polyp     large intestine      Chronic constipation      Chronic pain syndrome      COPD (chronic obstructive pulmonary disease) (H)     Oxygen at night      Depression      Diabetes mellitus (H)      Dry eye syndrome      Fibromyalgia      Ganglion     left wrist     GERD (gastroesophageal reflux disease)      Hyperlipidemia      Hypertension      Hypokalemia      Larynx edema      Lung disease      Medial epicondylitis      Onychomycosis      Osteoarthritis      Otitis Externa     Created by Conversion      Peptic ulcer      Type 2 diabetes mellitus (H)      Vulvar malignant neoplasm (H)      Past Surgical History:   Procedure Laterality Date     BREAST BIOPSY Right      BREAST BIOPSY Right 01/28/2015     BREAST BIOPSY Right 1/28/2015    Procedure: RIGHT BREAST BIOPSY AFTER WIRE LOCALIZATION AT 0940;  Surgeon: Renée Soriano MD;  Location: [facility];  Service:      HYSTERECTOMY       VA ABLATE HEART DYSRHYTHM FOCUS      Description: Catheter Ablation Atrial Fibrillation;  Recorded: 07/31/2012;  Comments: 7/24/12 PVI with Dr. Gardiner and nilay to all 5 pulm veins and CTI fl ablation line as well.     VA BIOPSY OF BREAST, INCISIONAL      Description: Incisional Breast Biopsy;  Recorded: 11/13/2007;  Comments: benign     VA SUPRACERV ABD HYSTERECTOMY      Description: Supracervical Hysterectomy;  Proc Date: 01/01/1985;  Comments: some cervix left!; ovaries intact; done for bleeding     VA TOTAL ABDOM HYSTERECTOMY      Description: Total Abdominal Hysterectomy;  Recorded: 12/31/2013;     VA TOTAL KNEE ARTHROPLASTY      Description: Total Knee Arthroplasty;  Recorded: 11/13/2007;     Social History     Social History  "    Marital status:      Spouse name: N/A     Number of children: N/A     Years of education: N/A     Occupational History     Not on file.     Social History Main Topics     Smoking status: Light Tobacco Smoker     Types: Cigarettes     Last attempt to quit: 1/1/2014     Smokeless tobacco: Never Used      Comment: E-cig some day     Alcohol use Yes      Comment: very little     Drug use: No     Sexual activity: Not on file     Other Topics Concern     Not on file     Social History Narrative       Meds:    Current Outpatient Prescriptions:      ACCU-CHEK SOFTCLIX LANCETS lancets, TEST THREE TIMES DAILY, Disp: 200 each, Rfl: 2     albuterol (PROVENTIL) 2.5 mg /3 mL (0.083 %) nebulizer solution, Take 3 mL (2.5 mg total) by nebulization every 4 (four) hours as needed for wheezing or shortness of breath., Disp: 75 mL, Rfl: 12     atorvastatin (LIPITOR) 20 MG tablet, TAKE 1 TABLET(20 MG) BY MOUTH AT BEDTIME, Disp: 90 tablet, Rfl: 3     BD ULTRA-FINE SHORT PEN NEEDLE 31 gauge x 5/16\" Ndle, TEST FOUR TIMES DAILY WITH MEALS AND AT BEDTIME, Disp: 300 each, Rfl: 3     blood glucose meter (GLUCOMETER), Use 1 each As Directed as needed. Dispense glucometer brand per patient's insurance at pharmacy discretion., Disp: 1 each, Rfl: 0     blood glucose test (ACCU-CHEK PEDRO PLUS TEST STRP) strips, Use 1 test strip to test 3 times daily. Dispense brand per patient's insurance at pharmacy discretion., Disp: 100 each, Rfl: 11     budesonide-formoterol (SYMBICORT) 160-4.5 mcg/actuation inhaler, Inhale 2 puffs 2 (two) times a day., Disp: 1 Inhaler, Rfl: 2     CARTIA  mg 24 hr capsule, TAKE 1 CAPSULE BY MOUTH DAILY, Disp: 90 capsule, Rfl: 0     furosemide (LASIX) 20 MG tablet, Take 1 tablet (20 mg total) by mouth daily., Disp: 90 tablet, Rfl: 3     gabapentin (NEURONTIN) 300 MG capsule, 1 tablet po qam, 2 tablet po qhs, Disp: 270 capsule, Rfl: 1     insulin aspart U-100 (NOVOLOG FLEXPEN U-100 INSULIN) 100 unit/mL injection " pen, Sliding scale QID (meals/bedtime): -180, 2 units; -220, 4 u; -260, 6 u; -300, 8 u; -340, 10 u; BG >340, 12 u, Disp: 5 Pre-filled Pen Syringe, Rfl: 0     ipratropium-albuterol (DUO-NEB) 0.5-2.5 mg/3 mL nebulizer, INAHALE 1 VIAL IN NEBULIZER EVERY 4 HOURS AS NEEDED, Disp: 1440 mL, Rfl: 0     leg brace (ELASTIC KNEE SUPPORT) Misc, Use 1 each As Directed daily., Disp: , Rfl: 0     loperamide (IMODIUM) 2 mg capsule, Take 2 capsules by mouth initially followed by 1 capsule after each loose stool bowel movement. Do not exceed 8 capsules per day (Patient taking differently: Take 2-4 mg by mouth see administration instructions. Take 2 capsules by mouth initially followed by 1 capsule after each loose stool bowel movement. Do not exceed 8 capsules per day), Disp: 30 capsule, Rfl: 1     meclizine (ANTIVERT) 25 mg tablet, TAKE 1 TABLET(25 MG) BY MOUTH THREE TIMES DAILY AS NEEDED FOR DIZZINESS OR NAUSEA, Disp: 30 tablet, Rfl: 5     metFORMIN (GLUCOPHAGE) 500 MG tablet, TAKE 1 TABLET(500 MG) BY MOUTH TWICE DAILY WITH MEALS, Disp: 180 tablet, Rfl: 0     moxifloxacin (VIGAMOX) 0.5 % ophthalmic solution, INT 1 GTT IN OD FOUR TIMES DAILY. START 1 DAY B SURGERY AND U UNTIL BOTTLE IS FINISHED, Disp: , Rfl: 1     multivitamin (DAILY-FAYE) per tablet, Take 1 tablet by mouth daily., Disp: 90 tablet, Rfl: 3     multivitamin with minerals (HAIR,SKIN AND NAILS) tablet, Take 1 tablet by mouth daily. Chewable formulation., Disp: , Rfl: 0     NEBULIZER/COMPRESSOR (NEBULIZER AND COMPRESSOR MISC), Use As Directed. Use UTD, Disp: , Rfl:      nystatin (NYSTOP) powder, Apply 1 application topically 2 (two) times a day as needed. 2-3 times to affected area(s)., Disp: 15 g, Rfl: 3     olopatadine (PATANOL) 0.1 % ophthalmic solution, Administer 1 drop to both eyes 2 (two) times a day as needed for allergies. , Disp: , Rfl:      omeprazole (PRILOSEC) 20 MG capsule, TAKE 1 CAPSULE(20 MG) BY MOUTH TWICE DAILY BEFORE  "MEALS, Disp: 180 capsule, Rfl: 4     ONETOUCH ULTRA TEST strips, USE 1 EACH AS DIRECTED, Disp: 200 strip, Rfl: 2     oxyCODONE (ROXICODONE) 10 mg immediate release tablet, Take 1 tablet (10 mg total) by mouth every 6 (six) hours as needed., Disp: 120 tablet, Rfl: 0     polymyxin B-trimethoprim (FOR POLYTRIM) 10,000 unit- 1 mg/mL Drop ophthalmic drops, 1 drop in affected eye q 3 hours while awake, Disp: 10 mL, Rfl: 0     prednisoLONE acetate (PRED-FORTE) 1 % ophthalmic suspension, SHAKE LQ AND INT 1 GTT IN OD FOUR TIMES DAILY. START 1 DAY B SURGERY AND U UNTIL INSTRUCTED TO DISCONTINUE, Disp: , Rfl: 1     ranitidine (ZANTAC) 150 MG tablet, Take 1 tablet (150 mg total) by mouth 2 (two) times a day., Disp: 180 tablet, Rfl: 2     UNABLE TO FIND, Take 1 capsule by mouth daily. Med Name: Probiotic with green tea., Disp: , Rfl:      VENTOLIN HFA 90 mcg/actuation inhaler, INHALE 1 TO 2 PUFFS BY MOUTH EVERY 4 TO 6 HOURS AS NEEDED, Disp: 18 g, Rfl: 5     warfarin (COUMADIN) 5 MG tablet, Take 1/2 tablet by mouth on Tuesday and Friday, and 1 tablet all other days or as directed based on inr, Disp: 90 tablet, Rfl: 1     sucralfate (CARAFATE) 1 gram tablet, TAKE 1 TABLET BY MOUTH FOUR TIMES DAILY(CRUSH TABLET AND MIX IT WITH A LITTLE WATER, THEN SWALLOW), Disp: 360 tablet, Rfl: 0    Current Facility-Administered Medications:      lidocaine 10 mg/mL (1 %) injection 10 mL, 10 mL, Intradermal, Once, Cammy Bui MD     lidocaine 10 mg/mL (1 %) injection 10 mL, 10 mL, Intradermal, Q28 Days, Cammy Bui MD, 10 mL at 03/16/18 1123    Allergies:  Allergies   Allergen Reactions     Celebrex [Celecoxib] Rash     patient had butterfly rash - \"lupus-like\"       Latex Rash       ROS:  Pertinent positives as noted in HPI; otherwise 12 point ROS negative.      Physical Exam:  EXAM:  /50 (Patient Site: Left Arm, Patient Position: Sitting, Cuff Size: Adult Regular)  Pulse 94  Temp 98.4  F (36.9  C) (Oral)  "  Resp 14  Wt 187 lb (84.8 kg)  SpO2 91%  BMI 31.6 kg/m2   Gen:  NAD, appears well, well-hydrated  HEENT:  TMs nl, oropharynx benign, nasal mucosa nl, conjunctiva clear  Neck:  Supple, no adenopathy, no thyromegaly, no carotid bruits, no JVD  Lungs:  Clear to auscultation bilaterally  Cor:  RRR no murmur  Abd:  Soft, mild tenderness to palpation at epigastrium, BS+, no masses, no guarding or rebound, no HSM  Extr:  Neg., no calf swelling or tenderness; tr edema  Neuro:  No asymmetry  Skin:  Warm/dry        Results:  Results for orders placed or performed in visit on 06/25/18   INR   Result Value Ref Range    INR 2.90 (H) 0.90 - 1.10   Iron and Transferrin Iron Binding Capacity   Result Value Ref Range    Iron 16 (L) 42 - 175 ug/dL    Transferrin 408 (H) 212 - 360 mg/dL    Transferrin Saturation, Calculated 3 (L) 20 - 50 %    Transferrin IBC, Calculated 510 313 - 563 ug/dL   HM1 (CBC with Diff)   Result Value Ref Range    WBC 8.0 4.0 - 11.0 thou/uL    RBC 4.45 3.80 - 5.40 mill/uL    Hemoglobin 10.0 (L) 12.0 - 16.0 g/dL    Hematocrit 32.4 (L) 35.0 - 47.0 %    MCV 73 (L) 80 - 100 fL    MCH 22.4 (L) 27.0 - 34.0 pg    MCHC 30.8 (L) 32.0 - 36.0 g/dL    RDW 17.3 (H) 11.0 - 14.5 %    Platelets 248 140 - 440 thou/uL    MPV 8.3 7.0 - 10.0 fL    Neutrophils % 56 50 - 70 %    Lymphocytes % 32 20 - 40 %    Monocytes % 8 2 - 10 %    Eosinophils % 3 0 - 6 %    Basophils % 1 0 - 2 %    Neutrophils Absolute 4.5 2.0 - 7.7 thou/uL    Lymphocytes Absolute 2.5 0.8 - 4.4 thou/uL    Monocytes Absolute 0.6 0.0 - 0.9 thou/uL    Eosinophils Absolute 0.3 0.0 - 0.4 thou/uL    Basophils Absolute 0.1 0.0 - 0.2 thou/uL

## 2021-06-19 NOTE — PROGRESS NOTES
ASSESSMENT/PLAN:  1. Acute sinusitis  cefdinir (OMNICEF) 300 MG capsule   2. Chronic pain syndrome  oxyCODONE (ROXICODONE) 10 mg immediate release tablet   3. Atrial fibrillation (H)  INR       67 yo female with:  1.  Signs / symptoms of acute sinusitis.  Will treat with Cefdinir.  If no improvement, needs re-evaluation.  2.  Chronic Pain Syndrome - renews monthly - no signs of increased use or demands - continue Oxycodone - unable to tolerate NSAIDs due to GI ulcers.  Refill today.  3.  Atrial fibrillation - due for INR - followed by anticoagulation team.  4.  Fibromyalgia/trigger point pain - patient manages pain in upper back with injections - see procedure note.       Administrations This Visit     lidocaine 10 mg/mL (1 %) injection 10 mL     Admin Date Action Dose Route Administered By             08/15/2018 Given 10 mL Intradermal Emma Brown CMA                          Medications Discontinued During This Encounter   Medication Reason     oxyCODONE (ROXICODONE) 10 mg immediate release tablet Reorder     gabapentin (NEURONTIN) 100 MG capsule Dose adjustment     There are no Patient Instructions on file for this visit.    Chief Complaint:  Chief Complaint   Patient presents with     Injections     trigger point injection     INR Check     Sinus Problem     possible sinus infection, need antibiotic        HPI:   Mary Kay Tejada is a 68 y.o. female c/o  Has been messing with her denture plate - trying to get new one  Ears hurt/sinuses hurt  Feels crummy  Pain across face - some purulent rhinorrhea -     Pain in upper back R>L        PMH:   Patient Active Problem List    Diagnosis Date Noted     Anemia due to blood loss, acute 07/18/2018     Diabetic polyneuropathy associated with type 2 diabetes mellitus (H) 07/18/2018     Chronic anemia 06/27/2018     Cataract 11/06/2017     Bunion 07/19/2017     Callus of foot 07/19/2017     Nonrheumatic aortic valve stenosis 05/23/2017     COPD (chronic obstructive  pulmonary disease) (H) 05/23/2017     DM neuropathy, type II diabetes mellitus (H) 05/19/2017     Chronic obstructive pulmonary disease with acute lower respiratory infection (H) 12/24/2016     Gastroenteritis 12/24/2016     Syncope 12/22/2016     Opacity of lung on imaging study 12/22/2016     Acute gastritis 12/22/2016     Osteoarthritis of both knees 08/22/2016     Osteoarthritis, hip, bilateral 06/20/2016     Primary osteoarthritis of left knee 06/20/2016     Candidal intertrigo 05/11/2016     Type 2 diabetes mellitus with hypoglycemia (H)      Aspiration pneumonia (H) 03/01/2016     Controlled substance agreement signed 11/23/2015     Cervical neck pain with evidence of disc disease 07/22/2015     Headache 07/17/2015     Hematoma of abdominal wall 07/05/2015     Skin lesion of face 05/22/2015     Tendonitis of wrist, right 04/22/2015     Menopause 04/06/2015     Flashers or floaters of right eye 02/23/2015     Tendonitis of wrist, left 01/21/2015     Trigger point of thoracic region 01/21/2015     Generalized osteoarthrosis, involving multiple sites 01/14/2015     Vertigo 12/17/2014     Rotator cuff tendonitis 12/17/2014     Abnormal Weight Loss      Osteoarthritis Of The Shoulder      Chronic Constipation      Onychomycosis Of The Toenails      Diarrhea      Ganglion Of The Left Wrist      Larynx Edema      Obesity      Malignant Vulvar Neoplasm      Medial Epicondylitis      Skin Lesion      Urinary Incontinence      Chronic Major Depression      Dry Eye Syndrome      Nicotine Dependence      Hypokalemia      Essential hypertension      Muscle Cramps In The Calf      Edema      Atrial flutter (H)      Lump In / On The Skin      Type 2 diabetes mellitus with hyperglycemia (H)      Chronic pain syndrome      Paroxysmal Atrial Fibrillation      Fibromyalgia      Nausea      Abdominal Pain      Peptic Ulcer      COPD exacerbation (H)      Mixed hyperlipidemia      Chronic Reflux Esophagitis      Benign  Adenomatous Polyp Of The Large Intestine      Past Medical History:   Diagnosis Date     Aortic stenosis      Atrial fibrillation (H)      Atrial flutter (H)      Benign neoplasm of adenomatous polyp     large intestine      Chronic constipation      Chronic pain syndrome      COPD (chronic obstructive pulmonary disease) (H)     Oxygen at night      Depression      Diabetes mellitus (H)      Dry eye syndrome      Fibromyalgia      Ganglion     left wrist     GERD (gastroesophageal reflux disease)      Hyperlipidemia      Hypertension      Hypokalemia      Larynx edema      Lung disease      Medial epicondylitis      Onychomycosis      Osteoarthritis      Otitis Externa     Created by Conversion      Peptic ulcer      Type 2 diabetes mellitus (H)      Vulvar malignant neoplasm (H)      Past Surgical History:   Procedure Laterality Date     BREAST BIOPSY Right      BREAST BIOPSY Right 01/28/2015     BREAST BIOPSY Right 1/28/2015    Procedure: RIGHT BREAST BIOPSY AFTER WIRE LOCALIZATION AT 0940;  Surgeon: Renée Soriano MD;  [place] [facility] [place]:      HYSTERECTOMY       MO ABLATE HEART DYSRHYTHM FOCUS      Description: Catheter Ablation Atrial Fibrillation;  Recorded: 07/31/2012;  Comments: 7/24/12 PVI with Dr. Gardiner and nilay to all 5 pulm veins and CTI fl ablation line as well.     MO BIOPSY OF BREAST, INCISIONAL      Description: Incisional Breast Biopsy;  Recorded: 11/13/2007;  Comments: benign     MO SUPRACERV ABD HYSTERECTOMY      Description: Supracervical Hysterectomy;  Proc Date: 01/01/1985;  Comments: some cervix left!; ovaries intact; done for bleeding     MO TOTAL ABDOM HYSTERECTOMY      Description: Total Abdominal Hysterectomy;  Recorded: 12/31/2013;     MO TOTAL KNEE ARTHROPLASTY      Description: Total Knee Arthroplasty;  Recorded: 11/13/2007;     Social History     Social History     Marital status:      Spouse name: N/A     Number of children: N/A     Years of education: N/A  "    Occupational History     Not on file.     Social History Main Topics     Smoking status: Light Tobacco Smoker     Types: Cigarettes     Last attempt to quit: 1/1/2014     Smokeless tobacco: Never Used      Comment: E-cig some day     Alcohol use Yes      Comment: very little     Drug use: No     Sexual activity: Not on file     Other Topics Concern     Not on file     Social History Narrative       Meds:    Current Outpatient Prescriptions:      ACCU-CHEK SOFTCLIX LANCETS lancets, TEST THREE TIMES DAILY, Disp: 200 each, Rfl: 2     albuterol (PROVENTIL) 2.5 mg /3 mL (0.083 %) nebulizer solution, Take 3 mL (2.5 mg total) by nebulization every 4 (four) hours as needed for wheezing or shortness of breath., Disp: 75 mL, Rfl: 12     atorvastatin (LIPITOR) 20 MG tablet, TAKE 1 TABLET(20 MG) BY MOUTH AT BEDTIME, Disp: 90 tablet, Rfl: 3     BD ULTRA-FINE SHORT PEN NEEDLE 31 gauge x 5/16\" Ndle, TEST FOUR TIMES DAILY WITH MEALS AND AT BEDTIME, Disp: 300 each, Rfl: 3     blood glucose meter (GLUCOMETER), Use 1 each As Directed as needed. Dispense glucometer brand per patient's insurance at pharmacy discretion., Disp: 1 each, Rfl: 0     blood glucose test (ACCU-CHEK EPDRO PLUS TEST STRP) strips, Use 1 test strip to test 3 times daily. Dispense brand per patient's insurance at pharmacy discretion., Disp: 100 each, Rfl: 11     budesonide-formoterol (SYMBICORT) 160-4.5 mcg/actuation inhaler, Inhale 2 puffs 2 (two) times a day., Disp: 1 Inhaler, Rfl: 2     CARTIA  mg 24 hr capsule, TAKE 1 CAPSULE BY MOUTH DAILY, Disp: 90 capsule, Rfl: 0     escitalopram oxalate (LEXAPRO) 10 MG tablet, TAKE 1 TABLET BY MOUTH EVERY DAY, Disp: 90 tablet, Rfl: 0     furosemide (LASIX) 20 MG tablet, Take 1 tablet (20 mg total) by mouth daily. (Patient taking differently: Take 20 mg by mouth daily as needed. ), Disp: 90 tablet, Rfl: 3     gabapentin (NEURONTIN) 300 MG capsule, 1 tablet po qam, 1 tablet po qafternoon, 2 tablet po qhs, Disp: 360 " capsule, Rfl: 1     ipratropium-albuterol (DUO-NEB) 0.5-2.5 mg/3 mL nebulizer, INAHALE 1 VIAL IN NEBULIZER EVERY 4 HOURS AS NEEDED, Disp: 1440 mL, Rfl: 0     leg brace (ELASTIC KNEE SUPPORT) Misc, Use 1 each As Directed daily., Disp: , Rfl: 0     loperamide (IMODIUM) 2 mg capsule, Take 2 capsules by mouth initially followed by 1 capsule after each loose stool bowel movement. Do not exceed 8 capsules per day (Patient taking differently: Take 2-4 mg by mouth see administration instructions. Take 2 capsules by mouth initially followed by 1 capsule after each loose stool bowel movement. Do not exceed 8 capsules per day), Disp: 30 capsule, Rfl: 1     meclizine (ANTIVERT) 25 mg tablet, TAKE 1 TABLET(25 MG) BY MOUTH THREE TIMES DAILY AS NEEDED FOR DIZZINESS OR NAUSEA, Disp: 30 tablet, Rfl: 5     metFORMIN (GLUCOPHAGE) 500 MG tablet, TAKE 1 TABLET(500 MG) BY MOUTH TWICE DAILY WITH MEALS, Disp: 180 tablet, Rfl: 0     moxifloxacin (VIGAMOX) 0.5 % ophthalmic solution, INT 1 GTT IN OD FOUR TIMES DAILY. START 1 DAY B SURGERY AND U UNTIL BOTTLE IS FINISHED, Disp: , Rfl: 1     multivitamin (DAILY-FAYE) per tablet, Take 1 tablet by mouth daily., Disp: 90 tablet, Rfl: 3     multivitamin with minerals (HAIR,SKIN AND NAILS) tablet, Take 1 tablet by mouth daily. Chewable formulation., Disp: , Rfl: 0     NEBULIZER/COMPRESSOR (NEBULIZER AND COMPRESSOR MISC), Use As Directed. Use UTD, Disp: , Rfl:      NOVOLOG FLEXPEN U-100 INSULIN 100 unit/mL injection pen, INJECT 10 UNITS SUBCUTANEOUS EVERY MEALS AND AT BEDTIME.(SEE ATTACHMENT FOR SLIDING SCALE), Disp: 15 mL, Rfl: 0     nystatin (NYSTOP) powder, Apply 1 application topically 2 (two) times a day as needed. 2-3 times to affected area(s)., Disp: 15 g, Rfl: 3     olopatadine (PATANOL) 0.1 % ophthalmic solution, Administer 1 drop to both eyes 2 (two) times a day as needed for allergies. , Disp: , Rfl:      omeprazole (PRILOSEC) 20 MG capsule, TAKE 1 CAPSULE(20 MG) BY MOUTH TWICE DAILY BEFORE  "MEALS, Disp: 180 capsule, Rfl: 4     ONETOUCH ULTRA TEST strips, USE 1 EACH AS DIRECTED, Disp: 200 strip, Rfl: 2     oxyCODONE (ROXICODONE) 10 mg immediate release tablet, Take 1 tablet (10 mg total) by mouth every 6 (six) hours as needed., Disp: 120 tablet, Rfl: 0     ranitidine (ZANTAC) 150 MG tablet, Take 1 tablet (150 mg total) by mouth 2 (two) times a day., Disp: 180 tablet, Rfl: 2     sucralfate (CARAFATE) 1 gram tablet, TAKE 1 TABLET BY MOUTH FOUR TIMES DAILY(CRUSH TABLET AND MIX IT WITH A LITTLE WATER, THEN SWALLOW), Disp: 360 tablet, Rfl: 0     sucralfate (CARAFATE) 1 gram tablet, TAKE 1 TABLET BY MOUTH FOUR TIMES DAILY(CRUSH TABLET AND MIX IT WITH A LITTLE WATER, THEN SWALLOW), Disp: 120 tablet, Rfl: 12     UNABLE TO FIND, Take 1 capsule by mouth daily. Med Name: Probiotic with green tea., Disp: , Rfl:      VENTOLIN HFA 90 mcg/actuation inhaler, INHALE 1 TO 2 PUFFS BY MOUTH EVERY 4 TO 6 HOURS AS NEEDED, Disp: 18 g, Rfl: 5     warfarin (COUMADIN) 5 MG tablet, Take 1/2-1 tablet daily as directed by clinic, Disp: 90 tablet, Rfl: 1     warfarin (COUMADIN) 5 MG tablet, Take 2.5 to 5mg (1/2 or 1 tabs) by mouth daily as directed.  Adjust dose based on INR., Disp: 90 tablet, Rfl: 1     cefdinir (OMNICEF) 300 MG capsule, Take 1 capsule (300 mg total) by mouth 2 (two) times a day for 10 days., Disp: 20 capsule, Rfl: 0     PRECISION Q-I-D TEST strips, USE 1 STRIP THREE TIMES DAILY AS DIRECTED, Disp: 300 strip, Rfl: 2    Current Facility-Administered Medications:      lidocaine 10 mg/mL (1 %) injection 10 mL, 10 mL, Intradermal, Once, Cammy Bui MD     lidocaine 10 mg/mL (1 %) injection 10 mL, 10 mL, Intradermal, Q28 Days, Cammy Bui MD, 10 mL at 08/15/18 1028    Allergies:  Allergies   Allergen Reactions     Celebrex [Celecoxib] Rash     patient had butterfly rash - \"lupus-like\"       Latex Rash       ROS:  Pertinent positives as noted in HPI; otherwise 12 point ROS " negative.      Physical Exam:  EXAM:  /52 (Patient Site: Left Arm, Patient Position: Sitting, Cuff Size: Adult Regular)  Pulse 73  Temp 98.8  F (37.1  C) (Oral)   Resp 20  Wt 183 lb (83 kg)  Breastfeeding? No  BMI 30.93 kg/m2   Gen:  NAD, appears well, well-hydrated  HEENT:  TMs nl, oropharynx benign, nasal mucosa nl, conjunctiva clear  Neck:  Supple, no adenopathy, no thyromegaly, no carotid bruits, no JVD  Lungs:  Clear to auscultation bilaterally  Cor:  Irreg, no murmur  Abd:  Soft, nontender, BS+, no masses, no guarding or rebound, no HSM  Extr: tender to palpation at suprascapular muscles R>L;  Joint deformities knees, hands, elbows due to DJD  Neuro:  No asymmetry  Skin:  Warm/dry        Results:  Results for orders placed or performed in visit on 08/15/18   INR   Result Value Ref Range    INR 3.10 (H) 0.90 - 1.10           Procedure Note - Trigger Point Injections    Consent obtained: verbal    Indication:  Fibromyalgia; trigger points    5 trigger points identified.  Each trigger point swabbed x 3 with Betadine swab.  Each trigger point then injected with 2 ml of 1% Lidocaine (no epi).    Total volume injected:  10 ml    Complications:  None, patient tolerated procedure well.    Plan:  Discussed care with patient.  RTC for further injections in 30 days prn.

## 2021-06-19 NOTE — LETTER
Letter by Samantha Alexandra RN at      Author: Samantha Alexandra RN Service: -- Author Type: --    Filed:  Encounter Date: 11/15/2019 Status: Signed       [address], [address]  [place] [address]  Phone:  [phone]  Fax:  [phone]        November 15, 2019    ALVAREZ HINDS  [address] [place] [id number]    Dear Kev Muñiz is a copy of your completed PCA Assessment and Service Plan.  This is for your records and no action is required by you.  If you have additional questions regarding your assessment please contact me at [phone]. If you feel that your needs are not being met, please contact the Clinical Supervisor at [phone].    Sincerely,    Samantha Alexandra RN, PHN    E-mail: [email or web address]  Phone: [phone]    Care Manager  [facility]            Enclosure:  Completed PCA assessment

## 2021-06-19 NOTE — PROGRESS NOTES
"ASSESSMENT/PLAN:  1. Fibromyalgia     2. Atrial fibrillation (H)  INR   3. Anemia due to blood loss, acute  Hemoglobin   4. Diabetic polyneuropathy associated with type 2 diabetes mellitus (H)     5. Chronic pain syndrome  gabapentin (NEURONTIN) 300 MG capsule    oxyCODONE (ROXICODONE) 10 mg immediate release tablet   6. Trigger point of thoracic region         This is a 69 yo female with:  1.  Fibromyalgia - patient has had monthly trigger point injections - this has allowed us to minimize the amount of narcotics she uses for pain syndrome; continue her Oxycodone (no increase in her use for several years); trigger point injections performed today - see procedure.  2.  Atrial Fibrillation - stable - on chronic anticoagulation therapy - will check INR - followed by anticoagulation team  3.  Anemia - recent - chronic - no clear etiology - awaiting GI workup - will recheck hemoglobin - reviewed with patient -  4.  Diabetic polyneuropathy - type II DM - has had fairly stable numbers - reviewed          Medications Discontinued During This Encounter   Medication Reason     prednisoLONE acetate (PRED-FORTE) 1 % ophthalmic suspension Therapy completed     polymyxin B-trimethoprim (FOR POLYTRIM) 10,000 unit- 1 mg/mL Drop ophthalmic drops Therapy completed     gabapentin (NEURONTIN) 300 MG capsule Reorder     oxyCODONE (ROXICODONE) 10 mg immediate release tablet Reorder     There are no Patient Instructions on file for this visit.    Chief Complaint:  Chief Complaint   Patient presents with     Injections     trigger point       HPI:   Mary Kay Tejada is a 68 y.o. female c/o  No further signs of bleeding  Saw skin doctor - face lesion - \"blasted me\"  Will follow up in 1 year  Needs to get new plate in early August - currently super glued    Will have EGD in September - will get her in to [facility]?  Had been taking Aleve prior to her bleeding     PMH:   Patient Active Problem List    Diagnosis Date Noted     Anemia due to " blood loss, acute 07/18/2018     Diabetic polyneuropathy associated with type 2 diabetes mellitus (H) 07/18/2018     Chronic anemia 06/27/2018     Cataract 11/06/2017     Bunion 07/19/2017     Callus of foot 07/19/2017     Moderate aortic stenosis 05/23/2017     COPD (chronic obstructive pulmonary disease) (H) 05/23/2017     DM neuropathy, type II diabetes mellitus (H) 05/19/2017     Chronic obstructive pulmonary disease with acute lower respiratory infection (H) 12/24/2016     Gastroenteritis 12/24/2016     Syncope 12/22/2016     Opacity of lung on imaging study 12/22/2016     Acute gastritis 12/22/2016     Osteoarthritis of both knees 08/22/2016     Osteoarthritis, hip, bilateral 06/20/2016     Primary osteoarthritis of left knee 06/20/2016     Candidal intertrigo 05/11/2016     Type 2 diabetes mellitus with hypoglycemia (H)      Aspiration pneumonia (H) 03/01/2016     Controlled substance agreement signed 11/23/2015     Cervical neck pain with evidence of disc disease 07/22/2015     Headache 07/17/2015     Hematoma of abdominal wall 07/05/2015     Skin lesion of face 05/22/2015     Tendonitis of wrist, right 04/22/2015     Menopause 04/06/2015     Flashers or floaters of right eye 02/23/2015     Tendonitis of wrist, left 01/21/2015     Trigger point of thoracic region 01/21/2015     Generalized osteoarthrosis, involving multiple sites 01/14/2015     Vertigo 12/17/2014     Rotator cuff tendonitis 12/17/2014     Abnormal Weight Loss      Osteoarthritis Of The Shoulder      Chronic Constipation      Onychomycosis Of The Toenails      Diarrhea      Ganglion Of The Left Wrist      Larynx Edema      Obesity      Malignant Vulvar Neoplasm      Medial Epicondylitis      Skin Lesion      Urinary Incontinence      Chronic Major Depression      Dry Eye Syndrome      Nicotine Dependence      Hypokalemia      Essential hypertension      Muscle Cramps In The Calf      Edema      Atrial flutter (H)      Lump In / On The Skin       Type 2 diabetes mellitus with hyperglycemia (H)      Chronic pain syndrome      Paroxysmal Atrial Fibrillation      Fibromyalgia      Nausea      Abdominal Pain      Peptic Ulcer      COPD exacerbation (H)      Mixed hyperlipidemia      Chronic Reflux Esophagitis      Benign Adenomatous Polyp Of The Large Intestine      Past Medical History:   Diagnosis Date     Aortic stenosis      Atrial fibrillation (H)      Atrial flutter (H)      Benign neoplasm of adenomatous polyp     large intestine      Chronic constipation      Chronic pain syndrome      COPD (chronic obstructive pulmonary disease) (H)     Oxygen at night      Depression      Diabetes mellitus (H)      Dry eye syndrome      Fibromyalgia      Ganglion     left wrist     GERD (gastroesophageal reflux disease)      Hyperlipidemia      Hypertension      Hypokalemia      Larynx edema      Lung disease      Medial epicondylitis      Onychomycosis      Osteoarthritis      Otitis Externa     Created by Conversion      Peptic ulcer      Type 2 diabetes mellitus (H)      Vulvar malignant neoplasm (H)      Past Surgical History:   Procedure Laterality Date     BREAST BIOPSY Right      BREAST BIOPSY Right 01/28/2015     BREAST BIOPSY Right 1/28/2015    Procedure: RIGHT BREAST BIOPSY AFTER WIRE LOCALIZATION AT 0940;  Surgeon: Renée Soriano MD;  Location: [facility] - [place];  Service:      HYSTERECTOMY       NV ABLATE HEART DYSRHYTHM FOCUS      Description: Catheter Ablation Atrial Fibrillation;  Recorded: 07/31/2012;  Comments: 7/24/12 PVI with Dr. Gardiner and nilay to all 5 pulm veins and CTI fl ablation line as well.     NV BIOPSY OF BREAST, INCISIONAL      Description: Incisional Breast Biopsy;  Recorded: 11/13/2007;  Comments: benign     NV SUPRACERV ABD HYSTERECTOMY      Description: Supracervical Hysterectomy;  Proc Date: 01/01/1985;  Comments: some cervix left!; ovaries intact; done for bleeding     NV TOTAL ABDOM HYSTERECTOMY      Description: Total  "Abdominal Hysterectomy;  Recorded: 12/31/2013;     [place] TOTAL KNEE ARTHROPLASTY      Description: Total Knee Arthroplasty;  Recorded: 11/13/2007;     Social History     Social History     Marital status:      Spouse name: N/A     Number of children: N/A     Years of education: N/A     Occupational History     Not on file.     Social History Main Topics     Smoking status: Light Tobacco Smoker     Types: Cigarettes     Last attempt to quit: 1/1/2014     Smokeless tobacco: Never Used      Comment: E-cig some day     Alcohol use Yes      Comment: very little     Drug use: No     Sexual activity: Not on file     Other Topics Concern     Not on file     Social History Narrative       Meds:    Current Outpatient Prescriptions:      ACCU-CHEK SOFTCLIX LANCETS lancets, TEST THREE TIMES DAILY, Disp: 200 each, Rfl: 2     albuterol (PROVENTIL) 2.5 mg /3 mL (0.083 %) nebulizer solution, Take 3 mL (2.5 mg total) by nebulization every 4 (four) hours as needed for wheezing or shortness of breath., Disp: 75 mL, Rfl: 12     atorvastatin (LIPITOR) 20 MG tablet, TAKE 1 TABLET(20 MG) BY MOUTH AT BEDTIME, Disp: 90 tablet, Rfl: 3     BD ULTRA-FINE SHORT PEN NEEDLE 31 gauge x 5/16\" Ndle, TEST FOUR TIMES DAILY WITH MEALS AND AT BEDTIME, Disp: 300 each, Rfl: 3     blood glucose meter (GLUCOMETER), Use 1 each As Directed as needed. Dispense glucometer brand per patient's insurance at pharmacy discretion., Disp: 1 each, Rfl: 0     blood glucose test (ACCU-CHEK PEDRO PLUS TEST STRP) strips, Use 1 test strip to test 3 times daily. Dispense brand per patient's insurance at pharmacy discretion., Disp: 100 each, Rfl: 11     budesonide-formoterol (SYMBICORT) 160-4.5 mcg/actuation inhaler, Inhale 2 puffs 2 (two) times a day., Disp: 1 Inhaler, Rfl: 2     CARTIA  mg 24 hr capsule, TAKE 1 CAPSULE BY MOUTH DAILY, Disp: 90 capsule, Rfl: 0     furosemide (LASIX) 20 MG tablet, Take 1 tablet (20 mg total) by mouth daily., Disp: 90 tablet, Rfl: " 3     gabapentin (NEURONTIN) 300 MG capsule, 1 tablet po qam, 1 tablet po qafternoon, 2 tablet po qhs, Disp: 360 capsule, Rfl: 1     insulin aspart U-100 (NOVOLOG FLEXPEN U-100 INSULIN) 100 unit/mL injection pen, Sliding scale QID (meals/bedtime): -180, 2 units; -220, 4 u; -260, 6 u; -300, 8 u; -340, 10 u; BG >340, 12 u, Disp: 5 Pre-filled Pen Syringe, Rfl: 0     ipratropium-albuterol (DUO-NEB) 0.5-2.5 mg/3 mL nebulizer, INAHALE 1 VIAL IN NEBULIZER EVERY 4 HOURS AS NEEDED, Disp: 1440 mL, Rfl: 0     leg brace (ELASTIC KNEE SUPPORT) Misc, Use 1 each As Directed daily., Disp: , Rfl: 0     loperamide (IMODIUM) 2 mg capsule, Take 2 capsules by mouth initially followed by 1 capsule after each loose stool bowel movement. Do not exceed 8 capsules per day (Patient taking differently: Take 2-4 mg by mouth see administration instructions. Take 2 capsules by mouth initially followed by 1 capsule after each loose stool bowel movement. Do not exceed 8 capsules per day), Disp: 30 capsule, Rfl: 1     meclizine (ANTIVERT) 25 mg tablet, TAKE 1 TABLET(25 MG) BY MOUTH THREE TIMES DAILY AS NEEDED FOR DIZZINESS OR NAUSEA, Disp: 30 tablet, Rfl: 5     metFORMIN (GLUCOPHAGE) 500 MG tablet, TAKE 1 TABLET(500 MG) BY MOUTH TWICE DAILY WITH MEALS, Disp: 180 tablet, Rfl: 0     moxifloxacin (VIGAMOX) 0.5 % ophthalmic solution, INT 1 GTT IN OD FOUR TIMES DAILY. START 1 DAY B SURGERY AND U UNTIL BOTTLE IS FINISHED, Disp: , Rfl: 1     multivitamin (DAILY-FAYE) per tablet, Take 1 tablet by mouth daily., Disp: 90 tablet, Rfl: 3     multivitamin with minerals (HAIR,SKIN AND NAILS) tablet, Take 1 tablet by mouth daily. Chewable formulation., Disp: , Rfl: 0     NEBULIZER/COMPRESSOR (NEBULIZER AND COMPRESSOR MISC), Use As Directed. Use UTD, Disp: , Rfl:      nystatin (NYSTOP) powder, Apply 1 application topically 2 (two) times a day as needed. 2-3 times to affected area(s)., Disp: 15 g, Rfl: 3     olopatadine (PATANOL) 0.1 %  "ophthalmic solution, Administer 1 drop to both eyes 2 (two) times a day as needed for allergies. , Disp: , Rfl:      omeprazole (PRILOSEC) 20 MG capsule, TAKE 1 CAPSULE(20 MG) BY MOUTH TWICE DAILY BEFORE MEALS, Disp: 180 capsule, Rfl: 4     ONETOUCH ULTRA TEST strips, USE 1 EACH AS DIRECTED, Disp: 200 strip, Rfl: 2     oxyCODONE (ROXICODONE) 10 mg immediate release tablet, Take 1 tablet (10 mg total) by mouth every 6 (six) hours as needed., Disp: 120 tablet, Rfl: 0     ranitidine (ZANTAC) 150 MG tablet, Take 1 tablet (150 mg total) by mouth 2 (two) times a day., Disp: 180 tablet, Rfl: 2     sucralfate (CARAFATE) 1 gram tablet, TAKE 1 TABLET BY MOUTH FOUR TIMES DAILY(CRUSH TABLET AND MIX IT WITH A LITTLE WATER, THEN SWALLOW), Disp: 360 tablet, Rfl: 0     UNABLE TO FIND, Take 1 capsule by mouth daily. Med Name: Probiotic with green tea., Disp: , Rfl:      VENTOLIN HFA 90 mcg/actuation inhaler, INHALE 1 TO 2 PUFFS BY MOUTH EVERY 4 TO 6 HOURS AS NEEDED, Disp: 18 g, Rfl: 5     warfarin (COUMADIN) 5 MG tablet, Take 1/2 tablet by mouth on Tuesday and Friday, and 1 tablet all other days or as directed based on inr, Disp: 90 tablet, Rfl: 1    Current Facility-Administered Medications:      lidocaine 10 mg/mL (1 %) injection 10 mL, 10 mL, Intradermal, Once, Cammy Bui MD     lidocaine 10 mg/mL (1 %) injection 10 mL, 10 mL, Intradermal, Q28 Days, Cammy Bui MD, 10 mL at 03/16/18 1123    Allergies:  Allergies   Allergen Reactions     Celebrex [Celecoxib] Rash     patient had butterfly rash - \"lupus-like\"       Latex Rash       ROS:  Pertinent positives as noted in HPI; otherwise 12 point ROS negative.      Physical Exam:  EXAM:  /52 (Patient Site: Left Arm, Patient Position: Sitting, Cuff Size: Adult Regular)  Pulse 74  Temp 97.8  F (36.6  C) (Oral)   Resp 14  Wt 187 lb 9.6 oz (85.1 kg)  SpO2 92%  BMI 31.7 kg/m2   Gen:  NAD, appears well, well-hydrated  HEENT:  TMs nl, oropharynx " benign, nasal mucosa nl, conjunctiva clear  Neck:  Supple, no adenopathy, no thyromegaly, no carotid bruits, no JVD  Lungs:  Clear to auscultation bilaterally  Cor:  RRR no murmur  Abd:  Soft, nontender, BS+, no masses, no guarding or rebound, no HSM  Back:  Trigger points/ tender muscle spasm aaliyah R>L suprascapular muscles  Extr:  Neg.  Neuro:  No asymmetry, Nl motor tone/strength, nl sensation, reflexes =, gait nl, nl coordination, CN intact,   Skin:  Warm/dry        Results:  Results for orders placed or performed in visit on 07/18/18   INR   Result Value Ref Range    INR 2.30 (H) 0.90 - 1.10   Hemoglobin   Result Value Ref Range    Hemoglobin 9.8 (L) 12.0 - 16.0 g/dL       Procedure Note - Trigger Point Injections    Consent obtained: verbal    Indication:  Fibromyalgia/trigger points    5 trigger points identified.  Each trigger point swabbed x 3 with Betadine swab.  Each trigger point then injected with 2 ml of 1% Lidocaine (no epi).    Total volume injected:  10 ml    Complications:  None, patient tolerated procedure well.    Plan:  Discussed care with patient.  RTC for further injections in 30 days prn.

## 2021-06-19 NOTE — PROGRESS NOTES
ASSESSMENT/PLAN:  1. Black stools  Hemoglobin    Ambulatory Referral for Upper GI Endoscopy    Ambulatory referral for Colonoscopy    Hemoglobin   2. Atrial fibrillation (H)  INR   3. Chronic anemia  Ambulatory Referral for Upper GI Endoscopy    Ambulatory referral for Colonoscopy    Hemoglobin       This is a 69 yo female with:  1.  Recent black stools - with stable hemoglobin (but chronic anemia) - no symptoms of acute blood loss.  Here for follow up.  GI wouldn't scheduled EGD without doctor to doctor call.  Reviewed with patient.  She really needs EGD and colonoscopy - will refer.  Hemoglobin today is stable; check INR.    2.  Chronic Anemia - hemoglobin has been 10 - no clear reason for low hemoglobin.  3.  Atrial Fib - on chronic anticoagulation - no signs of acute blood loss.  Will check INR.        There are no discontinued medications.  There are no Patient Instructions on file for this visit.    Chief Complaint:  Chief Complaint   Patient presents with     Follow-up     BLOODY STOOLS AND INR       HPI:   Mary Kay Tejada is a 68 y.o. female c/o  Stools are starting to look more normal - patient is not nauseated  Not taking any NSAIDs, not taking any other offending agents.    Does NOT want to go to the hospital if possible.      PMH:   Patient Active Problem List    Diagnosis Date Noted     Chronic anemia 06/27/2018     Cataract 11/06/2017     Bunion 07/19/2017     Callus of foot 07/19/2017     Moderate aortic stenosis 05/23/2017     COPD (chronic obstructive pulmonary disease) (H) 05/23/2017     DM neuropathy, type II diabetes mellitus (H) 05/19/2017     Chronic obstructive pulmonary disease with acute lower respiratory infection (H) 12/24/2016     Gastroenteritis 12/24/2016     Syncope 12/22/2016     Opacity of lung on imaging study 12/22/2016     Acute gastritis 12/22/2016     Osteoarthritis of both knees 08/22/2016     Osteoarthritis, hip, bilateral 06/20/2016     Primary osteoarthritis of left knee  06/20/2016     Candidal intertrigo 05/11/2016     Type 2 diabetes mellitus with hypoglycemia (H)      Aspiration pneumonia (H) 03/01/2016     Controlled substance agreement signed 11/23/2015     Cervical neck pain with evidence of disc disease 07/22/2015     Headache 07/17/2015     Hematoma of abdominal wall 07/05/2015     Skin lesion of face 05/22/2015     Tendonitis of wrist, right 04/22/2015     Menopause 04/06/2015     Flashers or floaters of right eye 02/23/2015     Tendonitis of wrist, left 01/21/2015     Trigger point of thoracic region 01/21/2015     Generalized osteoarthrosis, involving multiple sites 01/14/2015     Vertigo 12/17/2014     Rotator cuff tendonitis 12/17/2014     Abnormal Weight Loss      Osteoarthritis Of The Shoulder      Chronic Constipation      Onychomycosis Of The Toenails      Diarrhea      Ganglion Of The Left Wrist      Larynx Edema      Obesity      Malignant Vulvar Neoplasm      Medial Epicondylitis      Skin Lesion      Urinary Incontinence      Chronic Major Depression      Dry Eye Syndrome      Nicotine Dependence      Hypokalemia      Essential hypertension      Muscle Cramps In The Calf      Edema      Atrial flutter (H)      Lump In / On The Skin      Type 2 diabetes mellitus with hyperglycemia (H)      Chronic pain syndrome      Paroxysmal Atrial Fibrillation      Fibromyalgia      Nausea      Abdominal Pain      Peptic Ulcer      COPD exacerbation (H)      Mixed hyperlipidemia      Chronic Reflux Esophagitis      Benign Adenomatous Polyp Of The Large Intestine      Past Medical History:   Diagnosis Date     Aortic stenosis      Atrial fibrillation (H)      Atrial flutter (H)      Benign neoplasm of adenomatous polyp     large intestine      Chronic constipation      Chronic pain syndrome      COPD (chronic obstructive pulmonary disease) (H)     Oxygen at night      Depression      Diabetes mellitus (H)      Dry eye syndrome      Fibromyalgia      Ganglion     left wrist      GERD (gastroesophageal reflux disease)      Hyperlipidemia      Hypertension      Hypokalemia      Larynx edema      Lung disease      Medial epicondylitis      Onychomycosis      Osteoarthritis      Otitis Externa     Created by Conversion      Peptic ulcer      Type 2 diabetes mellitus (H)      Vulvar malignant neoplasm (H)      Past Surgical History:   Procedure Laterality Date     BREAST BIOPSY Right      BREAST BIOPSY Right 01/28/2015     BREAST BIOPSY Right 1/28/2015    Procedure: RIGHT BREAST BIOPSY AFTER WIRE LOCALIZATION AT 0940;  Surgeon: Renée Soriano MD;  Location: [facility] [place];  Service:      HYSTERECTOMY       NE ABLATE HEART DYSRHYTHM FOCUS      Description: Catheter Ablation Atrial Fibrillation;  Recorded: 07/31/2012;  Comments: 7/24/12 PVI with Dr. Gardiner and nilay to all 5 pulm veins and CTI fl ablation line as well.     NE BIOPSY OF BREAST, INCISIONAL      Description: Incisional Breast Biopsy;  Recorded: 11/13/2007;  Comments: benign     NE SUPRACERV ABD HYSTERECTOMY      Description: Supracervical Hysterectomy;  Proc Date: 01/01/1985;  Comments: some cervix left!; ovaries intact; done for bleeding     NE TOTAL ABDOM HYSTERECTOMY      Description: Total Abdominal Hysterectomy;  Recorded: 12/31/2013;     NE TOTAL KNEE ARTHROPLASTY      Description: Total Knee Arthroplasty;  Recorded: 11/13/2007;     Social History     Social History     Marital status:      Spouse name: N/A     Number of children: N/A     Years of education: N/A     Occupational History     Not on file.     Social History Main Topics     Smoking status: Light Tobacco Smoker     Types: Cigarettes     Last attempt to quit: 1/1/2014     Smokeless tobacco: Never Used      Comment: E-cig some day     Alcohol use Yes      Comment: very little     Drug use: No     Sexual activity: Not on file     Other Topics Concern     Not on file     Social History Narrative       Meds:    Current Outpatient Prescriptions:       "ACCU-CHEK SOFTCLIX LANCETS lancets, TEST THREE TIMES DAILY, Disp: 200 each, Rfl: 2     albuterol (PROVENTIL) 2.5 mg /3 mL (0.083 %) nebulizer solution, Take 3 mL (2.5 mg total) by nebulization every 4 (four) hours as needed for wheezing or shortness of breath., Disp: 75 mL, Rfl: 12     atorvastatin (LIPITOR) 20 MG tablet, TAKE 1 TABLET(20 MG) BY MOUTH AT BEDTIME, Disp: 90 tablet, Rfl: 3     BD ULTRA-FINE SHORT PEN NEEDLE 31 gauge x 5/16\" Ndle, TEST FOUR TIMES DAILY WITH MEALS AND AT BEDTIME, Disp: 300 each, Rfl: 3     blood glucose meter (GLUCOMETER), Use 1 each As Directed as needed. Dispense glucometer brand per patient's insurance at pharmacy discretion., Disp: 1 each, Rfl: 0     blood glucose test (ACCU-CHEK PEDRO PLUS TEST STRP) strips, Use 1 test strip to test 3 times daily. Dispense brand per patient's insurance at pharmacy discretion., Disp: 100 each, Rfl: 11     budesonide-formoterol (SYMBICORT) 160-4.5 mcg/actuation inhaler, Inhale 2 puffs 2 (two) times a day., Disp: 1 Inhaler, Rfl: 2     CARTIA  mg 24 hr capsule, TAKE 1 CAPSULE BY MOUTH DAILY, Disp: 90 capsule, Rfl: 0     furosemide (LASIX) 20 MG tablet, Take 1 tablet (20 mg total) by mouth daily., Disp: 90 tablet, Rfl: 3     gabapentin (NEURONTIN) 300 MG capsule, 1 tablet po qam, 2 tablet po qhs, Disp: 270 capsule, Rfl: 1     insulin aspart U-100 (NOVOLOG FLEXPEN U-100 INSULIN) 100 unit/mL injection pen, Sliding scale QID (meals/bedtime): -180, 2 units; -220, 4 u; -260, 6 u; -300, 8 u; -340, 10 u; BG >340, 12 u, Disp: 5 Pre-filled Pen Syringe, Rfl: 0     ipratropium-albuterol (DUO-NEB) 0.5-2.5 mg/3 mL nebulizer, INAHALE 1 VIAL IN NEBULIZER EVERY 4 HOURS AS NEEDED, Disp: 1440 mL, Rfl: 0     leg brace (ELASTIC KNEE SUPPORT) Misc, Use 1 each As Directed daily., Disp: , Rfl: 0     loperamide (IMODIUM) 2 mg capsule, Take 2 capsules by mouth initially followed by 1 capsule after each loose stool bowel movement. Do not exceed " 8 capsules per day (Patient taking differently: Take 2-4 mg by mouth see administration instructions. Take 2 capsules by mouth initially followed by 1 capsule after each loose stool bowel movement. Do not exceed 8 capsules per day), Disp: 30 capsule, Rfl: 1     meclizine (ANTIVERT) 25 mg tablet, TAKE 1 TABLET(25 MG) BY MOUTH THREE TIMES DAILY AS NEEDED FOR DIZZINESS OR NAUSEA, Disp: 30 tablet, Rfl: 5     metFORMIN (GLUCOPHAGE) 500 MG tablet, TAKE 1 TABLET(500 MG) BY MOUTH TWICE DAILY WITH MEALS, Disp: 180 tablet, Rfl: 0     moxifloxacin (VIGAMOX) 0.5 % ophthalmic solution, INT 1 GTT IN OD FOUR TIMES DAILY. START 1 DAY B SURGERY AND U UNTIL BOTTLE IS FINISHED, Disp: , Rfl: 1     multivitamin (DAILY-FAYE) per tablet, Take 1 tablet by mouth daily., Disp: 90 tablet, Rfl: 3     multivitamin with minerals (HAIR,SKIN AND NAILS) tablet, Take 1 tablet by mouth daily. Chewable formulation., Disp: , Rfl: 0     NEBULIZER/COMPRESSOR (NEBULIZER AND COMPRESSOR MISC), Use As Directed. Use UTD, Disp: , Rfl:      nystatin (NYSTOP) powder, Apply 1 application topically 2 (two) times a day as needed. 2-3 times to affected area(s)., Disp: 15 g, Rfl: 3     olopatadine (PATANOL) 0.1 % ophthalmic solution, Administer 1 drop to both eyes 2 (two) times a day as needed for allergies. , Disp: , Rfl:      omeprazole (PRILOSEC) 20 MG capsule, TAKE 1 CAPSULE(20 MG) BY MOUTH TWICE DAILY BEFORE MEALS, Disp: 180 capsule, Rfl: 4     ONETOUCH ULTRA TEST strips, USE 1 EACH AS DIRECTED, Disp: 200 strip, Rfl: 2     oxyCODONE (ROXICODONE) 10 mg immediate release tablet, Take 1 tablet (10 mg total) by mouth every 6 (six) hours as needed., Disp: 120 tablet, Rfl: 0     polymyxin B-trimethoprim (FOR POLYTRIM) 10,000 unit- 1 mg/mL Drop ophthalmic drops, 1 drop in affected eye q 3 hours while awake, Disp: 10 mL, Rfl: 0     prednisoLONE acetate (PRED-FORTE) 1 % ophthalmic suspension, SHAKE LQ AND INT 1 GTT IN OD FOUR TIMES DAILY. START 1 DAY B SURGERY AND U  "UNTIL INSTRUCTED TO DISCONTINUE, Disp: , Rfl: 1     ranitidine (ZANTAC) 150 MG tablet, Take 1 tablet (150 mg total) by mouth 2 (two) times a day., Disp: 180 tablet, Rfl: 2     sucralfate (CARAFATE) 1 gram tablet, TAKE 1 TABLET BY MOUTH FOUR TIMES DAILY(CRUSH TABLET AND MIX IT WITH A LITTLE WATER, THEN SWALLOW), Disp: 360 tablet, Rfl: 0     UNABLE TO FIND, Take 1 capsule by mouth daily. Med Name: Probiotic with green tea., Disp: , Rfl:      VENTOLIN HFA 90 mcg/actuation inhaler, INHALE 1 TO 2 PUFFS BY MOUTH EVERY 4 TO 6 HOURS AS NEEDED, Disp: 18 g, Rfl: 5     warfarin (COUMADIN) 5 MG tablet, Take 1/2 tablet by mouth on Tuesday and Friday, and 1 tablet all other days or as directed based on inr, Disp: 90 tablet, Rfl: 1    Current Facility-Administered Medications:      lidocaine 10 mg/mL (1 %) injection 10 mL, 10 mL, Intradermal, Once, Cammy Bui MD     lidocaine 10 mg/mL (1 %) injection 10 mL, 10 mL, Intradermal, Q28 Days, Cammy Bui MD, 10 mL at 03/16/18 1123    Allergies:  Allergies   Allergen Reactions     Celebrex [Celecoxib] Rash     patient had butterfly rash - \"lupus-like\"       Latex Rash       ROS:  Pertinent positives as noted in HPI; otherwise 12 point ROS negative.      Physical Exam:  EXAM:  /50 (Patient Site: Left Arm, Patient Position: Sitting, Cuff Size: Adult Regular)  Pulse 95  Temp 98.1  F (36.7  C) (Oral)   Resp 14  Wt 181 lb (82.1 kg)  SpO2 91%  BMI 30.59 kg/m2   Gen:  NAD, appears well, well-hydrated  HEENT:  TMs nl, oropharynx benign, nasal mucosa nl, conjunctiva clear  Neck:  Supple, no adenopathy, no thyromegaly, no carotid bruits, no JVD  Lungs:  Clear to auscultation bilaterally  Cor:  RRR no murmur  Abd:  Soft, nontender, BS+, no masses, no guarding or rebound, no HSM  Extr:  Neg.  Neuro:  No asymmetry  Skin:  Warm/dry        Results:  Results for orders placed or performed in visit on 06/27/18   INR   Result Value Ref Range    INR 3.80 (H) " 0.90 - 1.10   Hemoglobin   Result Value Ref Range    Hemoglobin 10.1 (L) 12.0 - 16.0 g/dL

## 2021-06-19 NOTE — LETTER
Letter by Samantha Alexandra RN at      Author: Samantha Alexandra RN Service: -- Author Type: --    Filed:  Encounter Date: 11/15/2019 Status: Signed       December 4, 2019    Important [employer] Information    MARY KAY HINDS  [address]  [facility] [address]  Your Care Plan  Dear Mary Kay,  When we spoke recently, I promised to send you a Care Plan. The plan enclosed is a summary of our discussion. It includes the steps we agreed would help you meet your health goals. In addition, I can help you with:  [employer]  This program is available to members who need a ride to medical and dental visits. To schedule a ride, call [phone] or [phone] (toll free). TTY/TTD: 711. You can call 8 a.m. to 8 p.m. Seven days a week. Access to a representative may be limited at times.      [employer]   The [employer] program empowers you to improve your health through education and exercise. To learn more, visit [employer]O, or call [employer] Customer Service at [phone] (toll free) (TTY:711) from 7 a.m. - 7 p.m. Central Time, Monday-Friday.  Health Care Directive   This form helps you outline your health care wishes. You can request a form from me and I will answer any questions you have before you discuss it with your doctor.   Annual Physical  Take a key step on your path to good health and set up an annual physical at your clinic.  Questions?  Call me at [phone] Monday-Friday between 8am and 5pm.  TTY/TTD: 711. As we discussed, I plan to be in touch with you again in 6 months to follow up via phone.  Sincerely,    Samantha Alexandra RN, PHN    E-mail: [email or web address]  Phone: [phone]    Care Manager  [facility]          cc: member records                Civil Rights Notice  Discrimination is against the law. [employer] does not discriminate on the basis of any of the following:    Race    Color    National Origin    Creed    Adventist    Age    Public Assistance Status    Receipt of  Health Care Services    Disability (including physical or mental impairment)    Sex (including sex stereotypes and gender identity)    Marital Status    Political Beliefs    Medical Condition    Genetic Information    Sexual Orientation    Claims Experience    Medical History    Health Status    Auxiliary Aids and Services:  [employer] provides auxiliary aids and services, like qualified interpreters or information in accessible formats, free of charge and in a timely manner, to ensure an equal opportunity to participate in our health care programs. Contact [employer] Customer Service at [employer]/contact medicaid or call [phone] (toll free); TTY:710 or at [employer]/contactNet [employer]ca[employer].    Language Assistance Services:  [employer] provides translated documents and spoken language interpreting, free of charge and in a timely manner, when language assistance services are necessary to ensure limited English speakers have meaningful access to our information and services. Contact [employer] at [phone] (toll free); TTY: 570 or [employer]/contactmedicaid.     Civil Rights Complaints  You have the right to file a discrimination complaint if you believe you were treated in a discriminatory way by [employer]. You may contact any of the following four agencies directly to file a discrimination complaint.    U.S. Department of Health and Human Services Office for Civil Rights (OCR)  You have the right to file a complaint with the OCR, a federal agency, if you believe you have been discriminated against because of any of the following:    Race    Disability    Color    Sex    National Origin    Age      Contact the OCR directly to file a complaint:         Director         U.S[place] Department of Health and Human Services Office for Civil Rights         [address] [place] [address], [place] [address]         [phone] (Voice)         [phone] (TDD)         Complaint Portal -  https://ocrportal.hhs.gov/ocr/portal/lobby.jsf     [place] Department of Human Rights (MDHR)  In [place], you have the right to file a complaint with the MDHR if you believe you have been discriminated against because of any of the following:      Race    Color    National Origin    Rastafari    Creed    Sex    Sexual Orientation    Marital Status    Public Assistance Status    Disability    Contact the MDHR directly to file a complaint:         [place] Department of Human Rights         [facility], [address] [place] [address]         [phone] (voice)          [phone] (toll free)         712 or [phone] ([place] Relay)         [phone] (Fax)         [email or web address] (Email)     [place] Department of Human Services (DHS)  You have the right to file a complaint with [facility] if you believe you have been discriminated against in our health care programs because of any of the following:    Race    Color    National Origin    Creed    Rastafari    Age    Public Assistance Status    Receipt of Health Care Services    Disability (including physical or mental impairment)    Sex (including sex stereotypes and gender identity)    Marital Status    Political Beliefs    Medical Condition    Genetic Information    Sexual Orientation    Claims Experience    Medical History    Health Status    Complaints must be in writing and filed within 180 days of the date you discovered the alleged discrimination. The complaint must contain your name and address and describe the discrimination you are complaining about. After we get your complaint, we will review it and notify you in writing about whether we have authority to investigate. If we do, we will investigate the complaint.      [facility] will notify you in writing of the investigations outcome. You have a right to appeal the outcome if you disagree with the decision. To appeal, you must send a written request to have [facility] review the  investigation outcome period. Be brief and state why you disagree with the decision. Include additional information you think is important.      If you file a complaint in this way, the people who work for the agency named in the complaint cannot retaliate against you. This means they cannot punish you in any way for filing a complaint. Filing a complaint in this way does not stop you from seeking out other legal or administration actions.     Contact DHS directly to file a discrimination complaint:        Civil Rights Coordinator        [place] Department of Human Services        Equal Opportunity and Access Division        [address] [address]        [place] [address] [phone] (voice) or use your preferred relay service     [employer] Complaint Notice   You have the right to file a complaint with [employer] if you believe you have been discriminated against because of any of the following:       Medical condition    Health status    Receipt of health care services    Claims experience    Medical history    Genetic information    Disability (including mental or physical impairment)    Marital status    Age    Sex (including sex stereotypes and gender identity)    Sexual orientation    National origin    Race    Color    Temple    Creed    Public assistance status    Political beliefs    You can file a complaint and ask for help in filing a complaint in person or by mail, phone, fax, or email at:     [employer] Civil Rights Coordinator  [facility]  [address], [place] [address] [phone] (voice and fax) or TTL:344  Email: [email or web address]    American Indians can continue to use [facility] and [facility] ([facility]) clinics. We will not require prior approval or impose any conditions for you to get services at these clinics. For elders age 65 years and older this includes Elderly Waiver (EW) services accessed through the [place]. If a doctor or other  provider in a [facility] or [facility] clinic refers you to a provider in our network, we will not require you to see your primary care provider prior to the referral.    For accessible formats of this publication or assistance with equal or access to our services, visit [employer], or call [phone] (toll free) or use your preferred relay service.

## 2021-06-19 NOTE — PROGRESS NOTES
"Cardiology Progress Note    Assessment:  Paroxysmal atrial fibrillation status post PVI in 2012, no recurrent events  Paroxysmal atrial flutter status post remote ablation, no new episodes  Aortic stenosis, moderate  Diabetes mellitus  COPD  Fibromyalgia      Plan:  Echo to reassess severity of aortic stenosis  Continue current cardiac medications including anticoagulation  Follow-up in 1 year    Subjective:   This is 68 y.o. female who comes in today for follow-up visit.  I saw her back in 2012 when she was having more atrial fibrillation.  She underwent PVI with good results.  She has seen AMat sullivan nurse practitioner last few years.  She is going back to see me because of more convenient clinic locations at [facility] [place].  She reports no prolonged heart racing or syncope.  When she gets stressed out she feels that her heartbeat becomes mildly irregular  but it does not feel like her atrial fibrillation or flutter.  She has chronic dyspnea which she relates to COPD and tobacco use.  She denies recent weight gain, PND, orthopnea.    Review of Systems:   General: WNL  Eyes: WNL  Ears/Nose/Throat: WNL  Lungs: WNL  Heart: WNL  Stomach: WNL  Bladder: WNL  Muscle/Joints: WNL  Skin: WNL  Nervous System: WNL  Mental Health: WNL     Blood: WNL    Objective:   /56 (Patient Site: Right Arm, Patient Position: Sitting, Cuff Size: Adult Regular)  Pulse 76  Resp 16  Ht 5' 4.5\" (1.638 m)  Wt 184 lb (83.5 kg)  BMI 31.1 kg/m2  Physical Exam:  GENERAL: no distress  NECK: No JVD  LUNGS: Increased breath sounds  CARDIAC: regular rhythm, S1 & S2 normal.  No heaves, thrills, gallops 2/6 systolic ejection murmur at the aortic area  ABDOMEN: flat, negative hepatosplenomegaly, soft and non-tender.  EXTREMITIES: No evidence of cyanosis, clubbing or edema.    Current Outpatient Prescriptions   Medication Sig Dispense Refill     ACCU-CHEK SOFTCLIX LANCETS lancets TEST THREE TIMES DAILY 200 each 2     albuterol (PROVENTIL) 2.5 mg /3 " "mL (0.083 %) nebulizer solution Take 3 mL (2.5 mg total) by nebulization every 4 (four) hours as needed for wheezing or shortness of breath. 75 mL 12     atorvastatin (LIPITOR) 20 MG tablet TAKE 1 TABLET(20 MG) BY MOUTH AT BEDTIME 90 tablet 3     BD ULTRA-FINE SHORT PEN NEEDLE 31 gauge x 5/16\" Ndle TEST FOUR TIMES DAILY WITH MEALS AND AT BEDTIME 300 each 3     blood glucose meter (GLUCOMETER) Use 1 each As Directed as needed. Dispense glucometer brand per patient's insurance at pharmacy discretion. 1 each 0     blood glucose test (ACCU-CHEK PEDRO PLUS TEST STRP) strips Use 1 test strip to test 3 times daily. Dispense brand per patient's insurance at pharmacy discretion. 100 each 11     budesonide-formoterol (SYMBICORT) 160-4.5 mcg/actuation inhaler Inhale 2 puffs 2 (two) times a day. 1 Inhaler 2     CARTIA  mg 24 hr capsule TAKE 1 CAPSULE BY MOUTH DAILY 90 capsule 0     furosemide (LASIX) 20 MG tablet Take 1 tablet (20 mg total) by mouth daily. (Patient taking differently: Take 20 mg by mouth daily as needed. ) 90 tablet 3     gabapentin (NEURONTIN) 300 MG capsule 1 tablet po qam, 1 tablet po qafternoon, 2 tablet po qhs 360 capsule 1     insulin aspart U-100 (NOVOLOG FLEXPEN U-100 INSULIN) 100 unit/mL injection pen Sliding scale QID (meals/bedtime): -180, 2 units; -220, 4 u; -260, 6 u; -300, 8 u; -340, 10 u; BG >340, 12 u 5 Pre-filled Pen Syringe 0     ipratropium-albuterol (DUO-NEB) 0.5-2.5 mg/3 mL nebulizer INAHALE 1 VIAL IN NEBULIZER EVERY 4 HOURS AS NEEDED 1440 mL 0     loperamide (IMODIUM) 2 mg capsule Take 2 capsules by mouth initially followed by 1 capsule after each loose stool bowel movement. Do not exceed 8 capsules per day (Patient taking differently: Take 2-4 mg by mouth see administration instructions. Take 2 capsules by mouth initially followed by 1 capsule after each loose stool bowel movement. Do not exceed 8 capsules per day) 30 capsule 1     meclizine (ANTIVERT) " 25 mg tablet TAKE 1 TABLET(25 MG) BY MOUTH THREE TIMES DAILY AS NEEDED FOR DIZZINESS OR NAUSEA 30 tablet 5     metFORMIN (GLUCOPHAGE) 500 MG tablet TAKE 1 TABLET(500 MG) BY MOUTH TWICE DAILY WITH MEALS 180 tablet 0     multivitamin (DAILY-FAYE) per tablet Take 1 tablet by mouth daily. 90 tablet 3     multivitamin with minerals (HAIR,SKIN AND NAILS) tablet Take 1 tablet by mouth daily. Chewable formulation.  0     NEBULIZER/COMPRESSOR (NEBULIZER AND COMPRESSOR MISC) Use As Directed. Use UTD       nystatin (NYSTOP) powder Apply 1 application topically 2 (two) times a day as needed. 2-3 times to affected area(s). 15 g 3     omeprazole (PRILOSEC) 20 MG capsule TAKE 1 CAPSULE(20 MG) BY MOUTH TWICE DAILY BEFORE MEALS 180 capsule 4     ONETOUCH ULTRA TEST strips USE 1 EACH AS DIRECTED 200 strip 2     oxyCODONE (ROXICODONE) 10 mg immediate release tablet Take 1 tablet (10 mg total) by mouth every 6 (six) hours as needed. 120 tablet 0     ranitidine (ZANTAC) 150 MG tablet Take 1 tablet (150 mg total) by mouth 2 (two) times a day. 180 tablet 2     sucralfate (CARAFATE) 1 gram tablet TAKE 1 TABLET BY MOUTH FOUR TIMES DAILY(CRUSH TABLET AND MIX IT WITH A LITTLE WATER, THEN SWALLOW) 120 tablet 12     UNABLE TO FIND Take 1 capsule by mouth daily. Med Name: Probiotic with green tea.       VENTOLIN HFA 90 mcg/actuation inhaler INHALE 1 TO 2 PUFFS BY MOUTH EVERY 4 TO 6 HOURS AS NEEDED 18 g 5     warfarin (COUMADIN) 5 MG tablet Take 1/2-1 tablet daily as directed by clinic 90 tablet 1     leg brace (ELASTIC KNEE SUPPORT) Misc Use 1 each As Directed daily.  0     moxifloxacin (VIGAMOX) 0.5 % ophthalmic solution INT 1 GTT IN OD FOUR TIMES DAILY. START 1 DAY B SURGERY AND U UNTIL BOTTLE IS FINISHED  1     olopatadine (PATANOL) 0.1 % ophthalmic solution Administer 1 drop to both eyes 2 (two) times a day as needed for allergies.        sucralfate (CARAFATE) 1 gram tablet TAKE 1 TABLET BY MOUTH FOUR TIMES DAILY(CRUSH TABLET AND MIX IT WITH  A LITTLE WATER, THEN SWALLOW) 360 tablet 0     Current Facility-Administered Medications   Medication Dose Route Frequency Provider Last Rate Last Dose     lidocaine 10 mg/mL (1 %) injection 10 mL  10 mL Intradermal Once Cammy Bui MD         lidocaine 10 mg/mL (1 %) injection 10 mL  10 mL Intradermal Q28 Days Cammy Bui MD   10 mL at 03/16/18 1123       Cardiographics:      Echocardiogram: 2017    Left ventricle ejection fraction is normal. The calculated left ventricular ejection fraction is 60%.    Aortic Valve: The valve is tricuspid. There is moderate global calcification with reduced excursion of the aortic valve present. Moderate stenosis. Mild regurgitation.    When compared to the previous study dated 12/23/2016, aortic valve mean systolic gradient is mildly higher.    Stress Test: 2010  Normal perfusion scan    Lab Results:       Lab Results   Component Value Date    CHOL 201 (H) 12/19/2017    CHOL 201 (H) 02/21/2017    CHOL 249 (H) 09/23/2016     Lab Results   Component Value Date    HDL 89 12/19/2017    HDL 81 02/21/2017    HDL 67 09/23/2016     Lab Results   Component Value Date    LDLCALC 88 12/19/2017    LDLCALC 91 02/21/2017    LDLCALC 149 (H) 09/23/2016     Lab Results   Component Value Date    TRIG 119 12/19/2017    TRIG 143 02/21/2017    TRIG 164 (H) 09/23/2016     BNP   Date Value Ref Range Status   03/01/2016 30 0 - 109 pg/mL Final       Surendra (Demar Pizano MD

## 2021-06-19 NOTE — LETTER
Letter by Surendra Pizano MD (Ted) at      Author: Surendra Pizano MD (Ted) Service: -- Author Type: --    Filed:  Encounter Date: 6/12/2019 Status: (Other)         Mary Kay Tejada  [address] [facility] [address]      June 12, 2019      Dear Mary Kay,    This letter is to remind you that you will be due for your follow up appointment with Dr. Bao Pizano . To help ensure you are in the best health possible, a regular follow-up with your cardiologist is essential.     Please call our Patient Scheduling Line at [phone] to schedule your appointment at your earliest convenience.  If you have recently scheduled an appointment, please disregard this letter.    We look forward to seeing you again. As always, we are available at the number  above for any questions or concerns you may have.      Sincerely,     The Physicians and Staff of [facility]

## 2021-06-20 NOTE — PROGRESS NOTES
"ASSESSMENT/PLAN:  1. Fibromyalgia     2. Atrial fibrillation (H)  INR   3. Trigger point of thoracic region     4. Chronic pain syndrome  gabapentin (NEURONTIN) 300 MG capsule    oxyCODONE (ROXICODONE) 10 mg immediate release tablet       This is a 67 yo female with:  1. Fibromyalgia/trigger point pain/chronic pain syndrome - she has had trigger point injections regularly to decxrease pain and allow less use of opiates.  Can't tolerate NSAIDs due to previous gastric ulcers.  Due for monthly refill on Oxycodone as well - this was sent.  2.  Atrial Fibrillation - on warfarin therapy - check INR - followed by anticoagulation team.     Administrations This Visit     lidocaine 10 mg/mL (1 %) injection 10 mL     Admin Date Action Dose Route Administered By             09/14/2018 Given 10 mL Intradermal Marcos Lovett CMA                          Medications Discontinued During This Encounter   Medication Reason     gabapentin (NEURONTIN) 300 MG capsule Dose adjustment     gabapentin (NEURONTIN) 300 MG capsule Reorder     oxyCODONE (ROXICODONE) 10 mg immediate release tablet Reorder     There are no Patient Instructions on file for this visit.    Chief Complaint:  Chief Complaint   Patient presents with     Injections     Trigger point injections       HPI:   Mary Kay Tejada is a 68 y.o. female c/o  Here for   1.  Trigger point injections - still has pain - mostly lateral today  In general, fairly stable  2.  Diabetes - reports sugars are \"okay\" - checks occasionally  3.  Needs INR today - no unusual bleeding/bruising    PMH:   Patient Active Problem List    Diagnosis Date Noted     Anemia due to blood loss, acute 07/18/2018     Diabetic polyneuropathy associated with type 2 diabetes mellitus (H) 07/18/2018     Chronic anemia 06/27/2018     Cataract 11/06/2017     Bunion 07/19/2017     Callus of foot 07/19/2017     Nonrheumatic aortic valve stenosis 05/23/2017     COPD (chronic obstructive pulmonary disease) (H) 05/23/2017 "     DM neuropathy, type II diabetes mellitus (H) 05/19/2017     Chronic obstructive pulmonary disease with acute lower respiratory infection (H) 12/24/2016     Gastroenteritis 12/24/2016     Syncope 12/22/2016     Opacity of lung on imaging study 12/22/2016     Acute gastritis 12/22/2016     Osteoarthritis of both knees 08/22/2016     Osteoarthritis, hip, bilateral 06/20/2016     Primary osteoarthritis of left knee 06/20/2016     Candidal intertrigo 05/11/2016     Type 2 diabetes mellitus with hypoglycemia (H)      Aspiration pneumonia (H) 03/01/2016     Controlled substance agreement signed 11/23/2015     Cervical neck pain with evidence of disc disease 07/22/2015     Headache 07/17/2015     Hematoma of abdominal wall 07/05/2015     Skin lesion of face 05/22/2015     Tendonitis of wrist, right 04/22/2015     Menopause 04/06/2015     Flashers or floaters of right eye 02/23/2015     Tendonitis of wrist, left 01/21/2015     Trigger point of thoracic region 01/21/2015     Generalized osteoarthrosis, involving multiple sites 01/14/2015     Vertigo 12/17/2014     Rotator cuff tendonitis 12/17/2014     Abnormal Weight Loss      Osteoarthritis Of The Shoulder      Chronic Constipation      Onychomycosis Of The Toenails      Diarrhea      Ganglion Of The Left Wrist      Larynx Edema      Obesity      Malignant Vulvar Neoplasm      Medial Epicondylitis      Skin Lesion      Urinary Incontinence      Chronic Major Depression      Dry Eye Syndrome      Nicotine Dependence      Hypokalemia      Essential hypertension      Muscle Cramps In The Calf      Edema      Atrial flutter (H)      Lump In / On The Skin      Type 2 diabetes mellitus with hyperglycemia (H)      Chronic pain syndrome      Paroxysmal Atrial Fibrillation      Fibromyalgia      Nausea      Abdominal Pain      Peptic Ulcer      COPD exacerbation (H)      Mixed hyperlipidemia      Chronic Reflux Esophagitis      Benign Adenomatous Polyp Of The Large Intestine       Past Medical History:   Diagnosis Date     Aortic stenosis      Atrial fibrillation (H)      Atrial flutter (H)      Benign neoplasm of adenomatous polyp     large intestine      Chronic constipation      Chronic pain syndrome      COPD (chronic obstructive pulmonary disease) (H)     Oxygen at night      Depression      Diabetes mellitus (H)      Dry eye syndrome      Fibromyalgia      Ganglion     left wrist     GERD (gastroesophageal reflux disease)      Hyperlipidemia      Hypertension      Hypokalemia      Larynx edema      Lung disease      Medial epicondylitis      Onychomycosis      Osteoarthritis      Otitis Externa     Created by Conversion      Peptic ulcer      Type 2 diabetes mellitus (H)      Vulvar malignant neoplasm (H)      Past Surgical History:   Procedure Laterality Date     BREAST BIOPSY Right      BREAST BIOPSY Right 01/28/2015     BREAST BIOPSY Right 1/28/2015    Procedure: RIGHT BREAST BIOPSY AFTER WIRE LOCALIZATION AT 0940;  Surgeon: Renée Soriano MD;  Location: [facility];  Service:      HYSTERECTOMY       NM ABLATE HEART DYSRHYTHM FOCUS      Description: Catheter Ablation Atrial Fibrillation;  Recorded: 07/31/2012;  Comments: 7/24/12 PVI with Dr. Gardiner and nilay to all 5 pulm veins and CTI fl ablation line as well.     NM BIOPSY OF BREAST, INCISIONAL      Description: Incisional Breast Biopsy;  Recorded: 11/13/2007;  Comments: benign     NM SUPRACERV ABD HYSTERECTOMY      Description: Supracervical Hysterectomy;  Proc Date: 01/01/1985;  Comments: some cervix left!; ovaries intact; done for bleeding     NM TOTAL ABDOM HYSTERECTOMY      Description: Total Abdominal Hysterectomy;  Recorded: 12/31/2013;     NM TOTAL KNEE ARTHROPLASTY      Description: Total Knee Arthroplasty;  Recorded: 11/13/2007;     Social History     Social History     Marital status:      Spouse name: N/A     Number of children: N/A     Years of education: N/A     Occupational History     Not on file.  "    Social History Main Topics     Smoking status: Light Tobacco Smoker     Types: Cigarettes     Last attempt to quit: 1/1/2014     Smokeless tobacco: Never Used      Comment: E-cig some day     Alcohol use Yes      Comment: very little     Drug use: No     Sexual activity: Not on file     Other Topics Concern     Not on file     Social History Narrative       Meds:    Current Outpatient Prescriptions:      ACCU-CHEK SOFTCLIX LANCETS lancets, TEST THREE TIMES DAILY, Disp: 300 each, Rfl: 3     albuterol (PROVENTIL) 2.5 mg /3 mL (0.083 %) nebulizer solution, Take 3 mL (2.5 mg total) by nebulization every 4 (four) hours as needed for wheezing or shortness of breath., Disp: 75 mL, Rfl: 12     atorvastatin (LIPITOR) 20 MG tablet, TAKE 1 TABLET(20 MG) BY MOUTH AT BEDTIME, Disp: 90 tablet, Rfl: 3     BD ULTRA-FINE SHORT PEN NEEDLE 31 gauge x 5/16\" Ndle, TEST FOUR TIMES DAILY WITH MEALS AND AT BEDTIME, Disp: 300 each, Rfl: 3     blood glucose meter (GLUCOMETER), Use 1 each As Directed as needed. Dispense glucometer brand per patient's insurance at pharmacy discretion., Disp: 1 each, Rfl: 0     blood glucose test (ACCU-CHEK PEDRO PLUS TEST STRP) strips, Use 1 test strip to test 3 times daily. Dispense brand per patient's insurance at pharmacy discretion., Disp: 100 each, Rfl: 11     budesonide-formoterol (SYMBICORT) 160-4.5 mcg/actuation inhaler, Inhale 2 puffs 2 (two) times a day., Disp: 1 Inhaler, Rfl: 2     diltiazem (CARTIA XT) 120 MG 24 hr capsule, Take 1 capsule (120 mg total) by mouth daily., Disp: 90 capsule, Rfl: 1     escitalopram oxalate (LEXAPRO) 10 MG tablet, TAKE 1 TABLET BY MOUTH EVERY DAY, Disp: 90 tablet, Rfl: 0     furosemide (LASIX) 20 MG tablet, Take 1 tablet (20 mg total) by mouth daily. (Patient taking differently: Take 20 mg by mouth daily as needed. ), Disp: 90 tablet, Rfl: 3     gabapentin (NEURONTIN) 300 MG capsule, 1 capsule po qam, 2 capsules po qafternoon, 2 capsules po qhs, Disp: 450 capsule, " Rfl: 3     ipratropium-albuterol (DUO-NEB) 0.5-2.5 mg/3 mL nebulizer, INAHALE 1 VIAL IN NEBULIZER EVERY 4 HOURS AS NEEDED, Disp: 1440 mL, Rfl: 0     leg brace (ELASTIC KNEE SUPPORT) Misc, Use 1 each As Directed daily., Disp: , Rfl: 0     loperamide (IMODIUM) 2 mg capsule, Take 2 capsules by mouth initially followed by 1 capsule after each loose stool bowel movement. Do not exceed 8 capsules per day (Patient taking differently: Take 2-4 mg by mouth see administration instructions. Take 2 capsules by mouth initially followed by 1 capsule after each loose stool bowel movement. Do not exceed 8 capsules per day), Disp: 30 capsule, Rfl: 1     meclizine (ANTIVERT) 25 mg tablet, TAKE 1 TABLET(25 MG) BY MOUTH THREE TIMES DAILY AS NEEDED FOR DIZZINESS OR NAUSEA, Disp: 30 tablet, Rfl: 5     metFORMIN (GLUCOPHAGE) 500 MG tablet, TAKE 1 TABLET(500 MG) BY MOUTH TWICE DAILY WITH MEALS, Disp: 180 tablet, Rfl: 0     moxifloxacin (VIGAMOX) 0.5 % ophthalmic solution, INT 1 GTT IN OD FOUR TIMES DAILY. START 1 DAY B SURGERY AND U UNTIL BOTTLE IS FINISHED, Disp: , Rfl: 1     multivitamin (DAILY-FAYE) per tablet, Take 1 tablet by mouth daily., Disp: 90 tablet, Rfl: 3     multivitamin with minerals (HAIR,SKIN AND NAILS) tablet, Take 1 tablet by mouth daily. Chewable formulation., Disp: , Rfl: 0     NEBULIZER/COMPRESSOR (NEBULIZER AND COMPRESSOR MISC), Use As Directed. Use UTD, Disp: , Rfl:      NOVOLOG FLEXPEN U-100 INSULIN 100 unit/mL injection pen, INJECT 10 UNITS SUBCUTANEOUS EVERY MEALS AND AT BEDTIME.(SEE ATTACHMENT FOR SLIDING SCALE), Disp: 15 mL, Rfl: 0     nystatin (NYSTOP) powder, Apply 1 application topically 2 (two) times a day as needed. 2-3 times to affected area(s)., Disp: 15 g, Rfl: 3     olopatadine (PATANOL) 0.1 % ophthalmic solution, Administer 1 drop to both eyes 2 (two) times a day as needed for allergies. , Disp: , Rfl:      omeprazole (PRILOSEC) 20 MG capsule, TAKE 1 CAPSULE(20 MG) BY MOUTH TWICE DAILY BEFORE MEALS,  "Disp: 180 capsule, Rfl: 4     ONETOUCH ULTRA BLUE TEST STRIP strips, TEST THREE TIMES DAILY, Disp: 200 strip, Rfl: 11     oxyCODONE (ROXICODONE) 10 mg immediate release tablet, Take 1 tablet (10 mg total) by mouth every 6 (six) hours as needed., Disp: 120 tablet, Rfl: 0     PRECISION Q-I-D TEST strips, USE 1 STRIP THREE TIMES DAILY AS DIRECTED, Disp: 300 strip, Rfl: 2     ranitidine (ZANTAC) 150 MG tablet, Take 1 tablet (150 mg total) by mouth 2 (two) times a day., Disp: 180 tablet, Rfl: 2     sucralfate (CARAFATE) 1 gram tablet, TAKE 1 TABLET BY MOUTH FOUR TIMES DAILY(CRUSH TABLET AND MIX IT WITH A LITTLE WATER, THEN SWALLOW), Disp: 360 tablet, Rfl: 0     sucralfate (CARAFATE) 1 gram tablet, TAKE 1 TABLET BY MOUTH FOUR TIMES DAILY(CRUSH TABLET AND MIX IT WITH A LITTLE WATER, THEN SWALLOW), Disp: 120 tablet, Rfl: 12     SYMBICORT 160-4.5 mcg/actuation inhaler, INHALE 2 PUFFS BY MOUTH TWICE DAILY, Disp: 1 Inhaler, Rfl: 6     UNABLE TO FIND, Take 1 capsule by mouth daily. Med Name: Probiotic with green tea., Disp: , Rfl:      VENTOLIN HFA 90 mcg/actuation inhaler, INHALE 1 TO 2 PUFFS BY MOUTH EVERY 4 TO 6 HOURS AS NEEDED, Disp: 18 g, Rfl: 5     warfarin (COUMADIN) 5 MG tablet, Take 1/2-1 tablet daily as directed by clinic, Disp: 90 tablet, Rfl: 1     warfarin (COUMADIN) 5 MG tablet, Take 2.5 to 5mg (1/2 or 1 tabs) by mouth daily as directed.  Adjust dose based on INR., Disp: 90 tablet, Rfl: 1    Current Facility-Administered Medications:      lidocaine 10 mg/mL (1 %) injection 10 mL, 10 mL, Intradermal, Once, Cammy Bui MD     lidocaine 10 mg/mL (1 %) injection 10 mL, 10 mL, Intradermal, Q28 Days, Cammy Bui MD, 10 mL at 09/14/18 7084    Allergies:  Allergies   Allergen Reactions     Celebrex [Celecoxib] Rash     patient had butterfly rash - \"lupus-like\"       Latex Rash       ROS:  Pertinent positives as noted in HPI; otherwise 12 point ROS negative.      Physical Exam:  EXAM:  BP " 120/56 (Patient Site: Right Arm, Patient Position: Sitting, Cuff Size: Adult Regular)  Pulse 76  Temp 97.9  F (36.6  C) (Oral)   Resp 20  Wt 179 lb 0.8 oz (81.2 kg)  Breastfeeding? No  BMI 30.26 kg/m2   Gen:  NAD, appears well, well-hydrated  HEENT:  TMs nl, oropharynx benign, nasal mucosa nl, conjunctiva clear  Neck:  Supple, no adenopathy, no thyromegaly, no carotid bruits, no JVD  Lungs:  Clear to auscultation bilaterally  Cor:  irreg, no murmur  Abd:  Soft, nontender, BS+, no masses, no guarding or rebound, no HSM  Extr:  Neg., tender to palpation at upper right back, aaliyah suprascapular/at lateral shoulder/deltoid; DJD knees, shoulders, hands  Neuro:  No asymmetry  Skin:  Warm/dry        Results:  Results for orders placed or performed in visit on 09/14/18   INR   Result Value Ref Range    INR 3.50 (H) 0.90 - 1.10       Procedure Note - Trigger Point Injections    Consent obtained: verbal    Indication:  Fibromyalgia/chronic pain    5 trigger points identified.  Each trigger point swabbed x 3 with Betadine swab.  Each trigger point then injected with 2 ml of 1% Lidocaine (no epi).    Total volume injected:  10 ml    Complications:  None, patient tolerated procedure well.    Plan:  Discussed care with patient.  RTC for further injections in 30 days prn.

## 2021-06-20 NOTE — LETTER
Letter by Cammy Bui MD at      Author: Cammy Bui MD Service: -- Author Type: --    Filed:  Encounter Date: 8/17/2020 Status: (Other)       My COPD Action Plan     Name: Mary Kay Tejada    YOB: 1950   Date: 8/17/2020    My doctor: Cammy Bui MD   My clinic: [facility]    [address] [place] [id number]  [phone]  My Controller Medicine: Formoterol/Budesonide (Symbicort)   Dose: 2 puffs BID     My Rescue Medicine: Albuterol (Proair/Ventolin/Proventil) inhaler   Dose: 2 puffs [id number] prn - wheezing, SOB     My Flare Up Medicine: Prednisone   Dose: taper  My COPD Severity: moderate - severe     Use of Oxygen: Oxygen Not Prescribed  and 3-4 Liters as needed     Make sure you've had your pneumonia   vaccines.          GREEN ZONE       Doing well today      Usual level of activity and exercise    Usual amount of cough and mucus    No shortness of breath    Usual level of health (thinking clearly, sleeping well, feel like eating) Actions:      Take daily medicines    Use oxygen as prescribed    Follow regular exercise and diet plan    Avoid cigarette smoke and other irritants that harm the lungs              YELLOW ZONE             Having a bad day or flare up      Short of breath more than usual    A lot more sputum (mucus) than usual    Sputum looks yellow, green, tan, brown or bloody    More coughing or wheezing    Fever or chills    Less energy; trouble completing activities    Trouble thinking or focusing    Using quick relief inhaler or nebulizer more often    Poor sleep; symptoms wake me up    Do not feel like eating Actions:      Get plenty of rest    Take daily medicines    Use quick relief inhaler every 4 hours    If you use oxygen, call you doctor to see if you should adjust your oxygen    Do breathing exercises or other things to help you relax    Let a loved one, friend or neighbor know you are feeling  worse    Call your care team if you have 2 or more symptoms.  Start taking steroids or antibiotics if directed by your care team              RED ZONE           Need medical care now      Severe shortness of breath (feel you can't breathe)    Fever, chills    Not enough breath to do any activity    Trouble coughing up mucus, walking or talking    Blood in mucus    Frequent coughing   Rescue medicines are not working    Not able to sleep because of breathing    Feel confused or drowsy    Chest pain    Actions:      Call your health care team.  If you cannot reach your care team, call 911 or go to the emergency room.           Annual Reminders:  Meet with Care Team, Flu Shot every Fall  Pharmacy:   [id number] - [place], [place] - [address] AT [facility] [address]  [address] [place] [address]  Phone: [phone] Fax: [phone]    [id number] - [place], [place] - [address] AT [facility] [address]  [facility] [address]  Phone: [phone] Fax: [phone]

## 2021-06-21 NOTE — ANESTHESIA POSTPROCEDURE EVALUATION
Patient: Mary Kay Tejada  ESOPHAGOGASTRODUODENOSCOPY  Anesthesia type: MAC    Patient location: Phase II Recovery  Last vitals:   Vitals:    11/06/18 1100   BP:    Pulse: 88   Resp:    Temp:    SpO2: 94%     Post vital signs: stable  Level of consciousness: awake, alert and oriented  Post-anesthesia pain: pain controlled  Post-anesthesia nausea and vomiting: no  Pulmonary: unassisted, return to baseline  Cardiovascular: stable and blood pressure at baseline  Hydration: adequate  Anesthetic events: no    QCDR Measures:  ASA# 11 - Berna-op Cardiac Arrest: ASA11B - Patient did NOT experience unanticipated cardiac arrest  ASA# 12 - Berna-op Mortality Rate: ASA12B - Patient did NOT die  ASA# 13 - PACU Re-Intubation Rate: NA - No ETT / LMA used for case  ASA# 10 - Composite Anes Safety: ASA10A - No serious adverse event    Additional Notes:

## 2021-06-21 NOTE — LETTER
Letter by Samantha Alexandra RN at      Author: Samantha Alexandra RN Service: -- Author Type: --    Filed:  Encounter Date: 10/26/2020 Status: (Other)       October 26, 2020    Important [employer] Information    MARY KAY HINDS  [address]  [facility] [address]  Your Care Plan  Dear Mary Kay,  When we spoke recently, I promised to send you a Care Plan. The plan enclosed is a summary of our discussion. It includes the steps we agreed would help you meet your health goals. In addition, I can help you with:  [employer]  This program is available to members who need a ride to medical and dental visits. To schedule a ride, call [phone] or [phone] (toll free). TTY/TTD: 711. You can call 8 a.m. to 8 p.m. Seven days a week. Access to a representative may be limited at times.      [employer]   The [employer] program empowers you to improve your health through education and exercise. To learn more, visit [employer], or call [employer] Customer Service at [phone] (toll free) (TTY:711) from 7 a.m. - 7 p.m. Central Time, Monday-Friday.  Health Care Directive   This form helps you outline your health care wishes. You can request a form from me and I will answer any questions you have before you discuss it with your doctor.   Annual Physical  Take a key step on your path to good health and set up an annual physical at your clinic.  Questions?  Call me at [phone] Monday-Friday between 8am and 5pm.  TTY/TTD: 711. As we discussed, I plan to be in touch with you again in 6 months to follow up via phone.  Sincerely,    Samantha Alexandra RN, PHN    E-mail: [email or web address]  Phone: [phone]    Care Manager  [facility]          cc: member records                Civil Rights Notice  Discrimination is against the law. [employer] does not discriminate on the basis of any of the following:    Race    Color    National Origin    Creed    Faith    Age    Public Assistance Status    Receipt of  Health Care Services    Disability (including physical or mental impairment)    Sex (including sex stereotypes and gender identity)    Marital Status    Political Beliefs    Medical Condition    Genetic Information    Sexual Orientation    Claims Experience    Medical History    Health Status    Auxiliary Aids and Services:  [employer] provides auxiliary aids and services, like qualified interpreters or information in accessible formats, free of charge and in a timely manner, to ensure an equal opportunity to participate in our health care programs. Contact [employer] Customer Service at BoxC/contact medicaid or call [phone] (toll free); TTY:714 or at Box[employer]/contactIce Energycaid.    Language Assistance Services:  [employer] provides translated documents and spoken language interpreting, free of charge and in a timely manner, when language assistance services are necessary to ensure limited English speakers have meaningful access to our information and services. Contact [employer] at [phone] (toll free); TTY: 835 or Box[employer]/contactmedicaid.     Civil Rights Complaints  You have the right to file a discrimination complaint if you believe you were treated in a discriminatory way by [employer]. You may contact any of the following four agencies directly to file a discrimination complaint.    U.S. Department of Health and Human Services Office for Civil Rights (OCR)  You have the right to file a complaint with the OCR, a federal agency, if you believe you have been discriminated against because of any of the following:    Race    Disability    Color    Sex    National Origin    Age      Contact the OCR directly to file a complaint:         Director         U.S[place] Department of Health and Human Services Office for Civil Rights         [address] [place] [address], [place] [address]         [phone] (Voice)         [phone] (TDD)         Complaint Portal -  https://ocrportal.hhs.gov/ocr/portal/lobby.jsf     [place] Department of Human Rights (MDHR)  In [place], you have the right to file a complaint with the MDHR if you believe you have been discriminated against because of any of the following:      Race    Color    National Origin    Congregational    Creed    Sex    Sexual Orientation    Marital Status    Public Assistance Status    Disability    Contact the MDHR directly to file a complaint:         [place] Department of Human Rights         [facility], [address] [place] [address]         [phone] (voice)          [phone] (toll free)         717 or [phone] ([place] Relay)         [phone] (Fax)         [email or web address] (Email)     [place] Department of Human Services (DHS)  You have the right to file a complaint with [facility] if you believe you have been discriminated against in our health care programs because of any of the following:    Race    Color    National Origin    Creed    Congregational    Age    Public Assistance Status    Receipt of Health Care Services    Disability (including physical or mental impairment)    Sex (including sex stereotypes and gender identity)    Marital Status    Political Beliefs    Medical Condition    Genetic Information    Sexual Orientation    Claims Experience    Medical History    Health Status    Complaints must be in writing and filed within 180 days of the date you discovered the alleged discrimination. The complaint must contain your name and address and describe the discrimination you are complaining about. After we get your complaint, we will review it and notify you in writing about whether we have authority to investigate. If we do, we will investigate the complaint.      [facility] will notify you in writing of the investigations outcome. You have a right to appeal the outcome if you disagree with the decision. To appeal, you must send a written request to have [facility] review the  investigation outcome period. Be brief and state why you disagree with the decision. Include additional information you think is important.      If you file a complaint in this way, the people who work for the agency named in the complaint cannot retaliate against you. This means they cannot punish you in any way for filing a complaint. Filing a complaint in this way does not stop you from seeking out other legal or administration actions.     Contact DHS directly to file a discrimination complaint:        Civil Rights Coordinator        [place] Department of Human Services        Equal Opportunity and Access Division        [address] [address]        [place] [address] [phone] (voice) or use your preferred relay service     [employer] Complaint Notice   You have the right to file a complaint with [employer] if you believe you have been discriminated against because of any of the following:       Medical condition    Health status    Receipt of health care services    Claims experience    Medical history    Genetic information    Disability (including mental or physical impairment)    Marital status    Age    Sex (including sex stereotypes and gender identity)    Sexual orientation    National origin    Race    Color    Hoahaoism    Creed    Public assistance status    Political beliefs    You can file a complaint and ask for help in filing a complaint in person or by mail, phone, fax, or email at:     [employer] Civil Rights Coordinator  [facility]  [address], [place] [address] [phone] (voice and fax) or TTH:032  Email: [email or web address]    American Indians can continue to use [facility] and [facility] ([facility]) clinics. We will not require prior approval or impose any conditions for you to get services at these clinics. For elders age 65 years and older this includes Elderly Waiver (EW) services accessed through the [facility]. If a doctor or other  provider in a [facility] or [facility] clinic refers you to a provider in our network, we will not require you to see your primary care provider prior to the referral.    For accessible formats of this publication or assistance with equal or access to our services, visit [email or web address], or call [phone] (toll free) or use your preferred relay service.

## 2021-06-21 NOTE — PROGRESS NOTES
"Preoperative Exam    Scheduled Procedure: Esophagogastroduodenoscopy   Surgery Date:  11/6/18  Surgery Location: [facility], fax [phone]    Surgeon:  Dr. Lit Fernando    Assessment/Plan:     1. Preop examination  HM1(CBC and Differential)    HM1 (CBC with Diff)   2. Chronic anemia  HM1(CBC and Differential)    HM1 (CBC with Diff)   3. Acute gastritis without hemorrhage, unspecified gastritis type     4. Chronic pain syndrome  oxyCODONE (ROXICODONE) 10 mg immediate release tablet   5. COPD (chronic obstructive pulmonary disease) (H)  Nebulizer with supplies (cup, tubing, mask, & filters)   6. Chronic obstructive pulmonary disease with acute lower respiratory infection (H)     7. Fibromyalgia  lidocaine 10 mg/mL (1 %) injection 10 mL   8. Need for shingles vaccine  Varicella Zoster, Recombinant Vaccine IM   9. Type 2 diabetes mellitus with hyperglycemia, without long-term current use of insulin (H)  Glycosylated Hemoglobin A1c    Comprehensive Metabolic Panel    Lipid Profile   10. Atrial fibrillation (H)  INR     This is a 69 yo female here for preop evaluation prior to planned EGD - she has had chronic anemia with presumed GI bleeding.  Patient is quite adamant that she has had gastric ulcer \"just like I had years ago\" and is insistent that the Sucralfate has been \"lifesaving\".  Hemoglobin is still low, but stable at 9.8.  She is on chronic coumadin therapy for atrial fibrillation - management of pre-procedure coumadin per our anticoagulation management team.  Otherwise, chronic medical conditions (Type II DM, COPD, atrial fib) stable at this time.  OK for procedure/surgery at this time.    Other issues addressed today:  1.  Fibromyalgia - patient has benefitted from trigger point injections to reduce the need for increasing opioids for pain control.  She has generally received monthly injections - this was done today - see procedure note below.  5 trigger points were identified on right upper " "back/posterior shoulder and injected successfully - without complication.  2.  Type II DM - patient reports numbers are \"stable\" when checked - will review A1c from today's labs.  3.  COPD - has used frequent steroids (Prednisone po) but currently off oral steroids and stable.  Has had recurrent bronchitis - but currently, underlying COPD is stable.  4.  Atrial Fibrillation - rate controlled.  On chronic coumadin.  5.  Need for shingles vaccine - patient is due for new Shingrix vaccine - given today.  Dose #2 due in 2-6 months.   6.  Other health maintenance - patient is due for colonoscopy; due for mammogram.  Declines today - will need follow up colonoscopy if no evidence of bleeding on EGD.  Will try to convince patient to schedule this in near future.  Also - needs mammogram - will continue to encourage.      Surgical Procedure Risk: Intermediate (reported cardiac risk generally 1-5%)  Have you had prior anesthesia?: Yes  Have you or any family members had a previous anesthesia reaction:  No  Do you or any family members have a history of a clotting or bleeding disorder?: Yes: patient is on chronic anticoagulation therapy with coumadin - for atrial fibrillation.  Cardiac Risk Assessment: increased risk for major cardiac complications based on  diabetes mellitus requiring use of insulin    Patient approved for surgery with general or local anesthesia.    Please Note:  Patient is taking medications for Chronic Pain.    Functional Status: Independent  Patient plans to recover at home with family.     Subjective:      Mary Kay Tejada is a 68 y.o. female who presents for a preoperative consultation.    Is certain she had a gastric ulcer - thinks the Sucralfate has \"saved her\"    Seen by cardiology - negative    All other systems reviewed and are negative, other than those listed in the HPI.    Pertinent History  Do you have difficulty breathing or chest pain after walking up a flight of stairs: No  History of " "obstructive sleep apnea: No  Steroid use in the last 6 months: Yes: has used po Prednisone for COPD exacerbations  Frequent Aspirin/NSAID use: No  Prior Blood Transfusion: No  Prior Blood Transfusion Reaction: No  If for some reason prior to, during or after the procedure, if it is medically indicated, would you be willing to have a blood transfusion?:  There is no transfusion refusal.    Current Outpatient Prescriptions   Medication Sig Dispense Refill     ACCU-CHEK SOFTCLIX LANCETS lancets TEST THREE TIMES DAILY 300 each 3     albuterol (PROVENTIL) 2.5 mg /3 mL (0.083 %) nebulizer solution Take 3 mL (2.5 mg total) by nebulization every 4 (four) hours as needed for wheezing or shortness of breath. 75 mL 12     atorvastatin (LIPITOR) 20 MG tablet TAKE 1 TABLET(20 MG) BY MOUTH AT BEDTIME 90 tablet 3     BD ULTRA-FINE SHORT PEN NEEDLE 31 gauge x 5/16\" Ndle TEST FOUR TIMES DAILY WITH MEALS AND AT BEDTIME 300 each 3     blood glucose meter (GLUCOMETER) Use 1 each As Directed as needed. Dispense glucometer brand per patient's insurance at pharmacy discretion. 1 each 0     blood glucose test (ACCU-CHEK PEDRO PLUS TEST STRP) strips Use 1 test strip to test 3 times daily. Dispense brand per patient's insurance at pharmacy discretion. 100 each 11     budesonide-formoterol (SYMBICORT) 160-4.5 mcg/actuation inhaler Inhale 2 puffs 2 (two) times a day. 1 Inhaler 2     diltiazem (CARTIA XT) 120 MG 24 hr capsule Take 1 capsule (120 mg total) by mouth daily. 90 capsule 1     escitalopram oxalate (LEXAPRO) 10 MG tablet TAKE 1 TABLET BY MOUTH EVERY DAY 90 tablet 0     furosemide (LASIX) 20 MG tablet Take 1 tablet (20 mg total) by mouth daily. (Patient taking differently: Take 20 mg by mouth daily as needed. ) 90 tablet 3     gabapentin (NEURONTIN) 300 MG capsule 1 capsule po qam, 2 capsules po qafternoon, 2 capsules po qhs 450 capsule 3     glipiZIDE (GLUCOTROL XL) 10 MG 24 hr tablet   0     insulin aspart U-100 (NOVOLOG FLEXPEN " U-100 INSULIN) 100 unit/mL injection pen INJECT 10 UNITS SUBCUTANEOUS EVERY MEALS AND AT BEDTIME.(SEE ATTACHMENT FOR SLIDING SCALE) 15 mL 0     ipratropium-albuterol (DUO-NEB) 0.5-2.5 mg/3 mL nebulizer INAHALE 1 VIAL IN NEBULIZER EVERY 4 HOURS AS NEEDED 1440 mL 0     leg brace (ELASTIC KNEE SUPPORT) Misc Use 1 each As Directed daily.  0     loperamide (IMODIUM) 2 mg capsule Take 2 capsules by mouth initially followed by 1 capsule after each loose stool bowel movement. Do not exceed 8 capsules per day (Patient taking differently: Take 2-4 mg by mouth see administration instructions. Take 2 capsules by mouth initially followed by 1 capsule after each loose stool bowel movement. Do not exceed 8 capsules per day) 30 capsule 1     meclizine (ANTIVERT) 25 mg tablet TAKE 1 TABLET(25 MG) BY MOUTH THREE TIMES DAILY AS NEEDED FOR DIZZINESS OR NAUSEA 30 tablet 5     metFORMIN (GLUCOPHAGE) 500 MG tablet TAKE 1 TABLET(500 MG) BY MOUTH TWICE DAILY WITH MEALS 180 tablet 0     moxifloxacin (VIGAMOX) 0.5 % ophthalmic solution INT 1 GTT IN OD FOUR TIMES DAILY. START 1 DAY B SURGERY AND U UNTIL BOTTLE IS FINISHED  1     multivitamin (DAILY-FAYE) per tablet Take 1 tablet by mouth daily. 90 tablet 3     multivitamin with minerals (HAIR,SKIN AND NAILS) tablet Take 1 tablet by mouth daily. Chewable formulation.  0     NEBULIZER/COMPRESSOR (NEBULIZER AND COMPRESSOR MISC) Use As Directed. Use UTD       nicotine (NICODERM CQ) 21 mg/24 hr CECILIA ONE PATCH ON THE SKIN QD  4     nystatin (NYSTOP) powder Apply 1 application topically 2 (two) times a day as needed. 2-3 times to affected area(s). 15 g 3     olopatadine (PATANOL) 0.1 % ophthalmic solution Administer 1 drop to both eyes 2 (two) times a day as needed for allergies.        omeprazole (PRILOSEC) 20 MG capsule TAKE 1 CAPSULE(20 MG) BY MOUTH TWICE DAILY BEFORE MEALS 180 capsule 4     ONETOUCH ULTRA BLUE TEST STRIP strips TEST THREE TIMES DAILY 200 strip 11     oxyCODONE (ROXICODONE) 10 mg  "immediate release tablet Take 1 tablet (10 mg total) by mouth every 6 (six) hours as needed. 120 tablet 0     PRECISION Q-I-D TEST strips USE 1 STRIP THREE TIMES DAILY AS DIRECTED 300 strip 2     prednisoLONE acetate (PRED-FORTE) 1 % ophthalmic suspension SHAKE LQ AND INT 1 GTT IN OS FOUR TIMES DAILY. START 1 DAY B SURGERY AND U UNTIL INSTRUCTED TO DISCONTINUE  2     predniSONE (DELTASONE) 10 mg tablet 6 tabs po daily x 2 d, 5 tabs po daily x 2 d, 4 tabs po daily x 2 d,  3 tabs po daily x 2 d,  2 tabs po daily x 2 d,  1 tab po daily x 2 d 42 tablet 0     ranitidine (ZANTAC) 150 MG tablet Take 1 tablet (150 mg total) by mouth 2 (two) times a day. 180 tablet 2     sucralfate (CARAFATE) 1 gram tablet TAKE 1 TABLET BY MOUTH FOUR TIMES DAILY(CRUSH TABLET AND MIX IT WITH A LITTLE WATER, THEN SWALLOW) 360 tablet 0     sucralfate (CARAFATE) 1 gram tablet TAKE 1 TABLET BY MOUTH FOUR TIMES DAILY(CRUSH TABLET AND MIX IT WITH A LITTLE WATER, THEN SWALLOW) 120 tablet 12     SYMBICORT 160-4.5 mcg/actuation inhaler INHALE 2 PUFFS BY MOUTH TWICE DAILY 1 Inhaler 6     UNABLE TO FIND Take 1 capsule by mouth daily. Med Name: Probiotic with green tea.       VENTOLIN HFA 90 mcg/actuation inhaler INHALE 1 TO 2 PUFFS BY MOUTH EVERY 4 TO 6 HOURS AS NEEDED 18 g 5     warfarin (COUMADIN) 5 MG tablet Take 1/2-1 tablet daily as directed by clinic 90 tablet 1     warfarin (COUMADIN) 5 MG tablet Take 2.5 to 5mg (1/2 or 1 tabs) by mouth daily as directed.  Adjust dose based on INR. 90 tablet 1     Current Facility-Administered Medications   Medication Dose Route Frequency Provider Last Rate Last Dose     lidocaine 10 mg/mL (1 %) injection 10 mL  10 mL Intradermal Once Cammy Bui MD         lidocaine 10 mg/mL (1 %) injection 10 mL  10 mL Intradermal Q28 Days Cammy Bui MD   10 mL at 09/14/18 0954        Allergies   Allergen Reactions     Celebrex [Celecoxib] Rash     patient had butterfly rash - \"lupus-like\"       " Latex Rash       Patient Active Problem List   Diagnosis     Abnormal Weight Loss     Osteoarthritis Of The Shoulder     Chronic Constipation     Onychomycosis Of The Toenails     Diarrhea     Ganglion Of The Left Wrist     Larynx Edema     Obesity     Malignant Vulvar Neoplasm     Medial Epicondylitis     Skin Lesion     Urinary Incontinence     Chronic Major Depression     Dry Eye Syndrome     Nicotine Dependence     Hypokalemia     Essential hypertension     Muscle Cramps In The Calf     Edema     Atrial flutter (H)     Lump In / On The Skin     Type 2 diabetes mellitus with hyperglycemia (H)     Chronic pain syndrome     Paroxysmal Atrial Fibrillation     Fibromyalgia     Nausea     Abdominal Pain     Peptic Ulcer     COPD exacerbation (H)     Mixed hyperlipidemia     Chronic Reflux Esophagitis     Benign Adenomatous Polyp Of The Large Intestine     Vertigo     Rotator cuff tendonitis     Generalized osteoarthrosis, involving multiple sites     Tendonitis of wrist, left     Trigger point of thoracic region     Flashers or floaters of right eye     Menopause     Tendonitis of wrist, right     Skin lesion of face     Hematoma of abdominal wall     Headache     Cervical neck pain with evidence of disc disease     Controlled substance agreement signed     Aspiration pneumonia (H)     Type 2 diabetes mellitus with hypoglycemia (H)     Candidal intertrigo     Osteoarthritis, hip, bilateral     Primary osteoarthritis of left knee     Osteoarthritis of both knees     Syncope     Opacity of lung on imaging study     Acute gastritis     Chronic obstructive pulmonary disease with acute lower respiratory infection (H)     Gastroenteritis     DM neuropathy, type II diabetes mellitus (H)     Nonrheumatic aortic valve stenosis     COPD (chronic obstructive pulmonary disease) (H)     Bunion     Callus of foot     Cataract     Chronic anemia     Anemia due to blood loss, acute     Diabetic polyneuropathy associated with type 2  diabetes mellitus (H)       Past Medical History:   Diagnosis Date     Aortic stenosis      Atrial fibrillation (H)      Atrial flutter (H)      Benign neoplasm of adenomatous polyp     large intestine      Chronic constipation      Chronic pain syndrome      COPD (chronic obstructive pulmonary disease) (H)     Oxygen at night      Depression      Diabetes mellitus (H)      Dry eye syndrome      Fibromyalgia      Ganglion     left wrist     GERD (gastroesophageal reflux disease)      Hyperlipidemia      Hypertension      Hypokalemia      Larynx edema      Lung disease      Medial epicondylitis      Onychomycosis      Osteoarthritis      Otitis Externa     Created by Conversion      Peptic ulcer      Type 2 diabetes mellitus (H)      Vulvar malignant neoplasm (H)        Past Surgical History:   Procedure Laterality Date     BREAST BIOPSY Right      BREAST BIOPSY Right 01/28/2015     BREAST BIOPSY Right 1/28/2015    Procedure: RIGHT BREAST BIOPSY AFTER WIRE LOCALIZATION AT 0940;  Surgeon: Renée Soriano MD;  Location: [facility] [place];  Service:      HYSTERECTOMY       NJ ABLATE HEART DYSRHYTHM FOCUS      Description: Catheter Ablation Atrial Fibrillation;  Recorded: 07/31/2012;  Comments: 7/24/12 PVI with Dr. Gardiner and nilay to all 5 pulm veins and CTI fl ablation line as well.     NJ BIOPSY OF BREAST, INCISIONAL      Description: Incisional Breast Biopsy;  Recorded: 11/13/2007;  Comments: benign     NJ SUPRACERV ABD HYSTERECTOMY      Description: Supracervical Hysterectomy;  Proc Date: 01/01/1985;  Comments: some cervix left!; ovaries intact; done for bleeding     NJ TOTAL ABDOM HYSTERECTOMY      Description: Total Abdominal Hysterectomy;  Recorded: 12/31/2013;     NJ TOTAL KNEE ARTHROPLASTY      Description: Total Knee Arthroplasty;  Recorded: 11/13/2007;       Social History     Social History     Marital status:      Spouse name: N/A     Number of children: N/A     Years of education: N/A  "    Occupational History     Not on file.     Social History Main Topics     Smoking status: Light Tobacco Smoker     Types: Cigarettes     Last attempt to quit: 1/1/2014     Smokeless tobacco: Never Used      Comment: E-cig some day     Alcohol use Yes      Comment: very little     Drug use: No     Sexual activity: Not on file     Other Topics Concern     Not on file     Social History Narrative       Patient Care Team:  Cammy Bui MD as PCP - General          Objective:     Vitals:    10/17/18 0920   BP: 110/48   Pulse: 85   Resp: 20   Temp: 98.6  F (37  C)   TempSrc: Oral   SpO2: 95%   Weight: 179 lb 3 oz (81.3 kg)   Height: 5' 4.5\" (1.638 m)         Physical Exam:  EXAM:  /48 (Patient Site: Right Arm, Patient Position: Sitting, Cuff Size: Adult Regular)  Pulse 85  Temp 98.6  F (37  C) (Oral)   Resp 20  Ht 5' 4.5\" (1.638 m)  Wt 179 lb 3 oz (81.3 kg)  SpO2 95%  Breastfeeding? No  BMI 30.28 kg/m2   Gen:  NAD, appears well, well-hydrated  HEENT:  TMs nl, oropharynx benign, nasal mucosa nl, conjunctiva clear  Neck:  Supple, no adenopathy, no thyromegaly, no carotid bruits, no JVD  Lungs:  Clear to auscultation bilaterally  Cor:  RRR no murmur  Abd:  Soft, nontender, BS+, no masses, no guarding or rebound, no HSM  Extr:  Neg.  Back:  Trigger points identified on right paracervical, suprascapular, upper thoracic muscles  Neuro:  No asymmetry  Skin:  Warm/dry        There are no Patient Instructions on file for this visit.        Labs:  Results for orders placed or performed in visit on 10/17/18   Glycosylated Hemoglobin A1c   Result Value Ref Range    Hemoglobin A1c 6.0 3.5 - 6.0 %   Comprehensive Metabolic Panel   Result Value Ref Range    Sodium 143 136 - 145 mmol/L    Potassium 3.5 3.5 - 5.0 mmol/L    Chloride 104 98 - 107 mmol/L    CO2 24 22 - 31 mmol/L    Anion Gap, Calculation 15 5 - 18 mmol/L    Glucose 63 (L) 70 - 125 mg/dL    BUN 12 8 - 22 mg/dL    Creatinine 0.63 0.60 - 1.10 " mg/dL    GFR MDRD Af Amer >60 >60 mL/min/1.73m2    GFR MDRD Non Af Amer >60 >60 mL/min/1.73m2    Bilirubin, Total 0.3 0.0 - 1.0 mg/dL    Calcium 9.0 8.5 - 10.5 mg/dL    Protein, Total 6.6 6.0 - 8.0 g/dL    Albumin 3.5 3.5 - 5.0 g/dL    Alkaline Phosphatase 97 45 - 120 U/L    AST 16 0 - 40 U/L    ALT 24 0 - 45 U/L   Lipid Profile   Result Value Ref Range    Triglycerides 78 <=149 mg/dL    Cholesterol 169 <=199 mg/dL    LDL Calculated 73 <=129 mg/dL    HDL Cholesterol 80 >=50 mg/dL    Patient Fasting > 8hrs? Yes    HM1 (CBC with Diff)   Result Value Ref Range    WBC 9.8 4.0 - 11.0 thou/uL    RBC 4.46 3.80 - 5.40 mill/uL    Hemoglobin 9.8 (L) 12.0 - 16.0 g/dL    Hematocrit 35.4 35.0 - 47.0 %    MCV 79 (L) 80 - 100 fL    MCH 22.0 (L) 27.0 - 34.0 pg    MCHC 27.7 (L) 32.0 - 36.0 g/dL    RDW 19.3 (H) 11.0 - 14.5 %    Platelets 317 140 - 440 thou/uL    MPV 10.8 8.5 - 12.5 fL    Neutrophils % 71 (H) 50 - 70 %    Lymphocytes % 19 (L) 20 - 40 %    Monocytes % 8 2 - 10 %    Eosinophils % 2 0 - 6 %    Basophils % 1 0 - 2 %    Neutrophils Absolute 7.0 2.0 - 7.7 thou/uL    Lymphocytes Absolute 1.8 0.8 - 4.4 thou/uL    Monocytes Absolute 0.8 0.0 - 0.9 thou/uL    Eosinophils Absolute 0.2 0.0 - 0.4 thou/uL    Basophils Absolute 0.1 0.0 - 0.2 thou/uL   INR   Result Value Ref Range    INR 2.60 (H) 0.90 - 1.10         Immunization History   Administered Date(s) Administered     DT (pediatric) 03/01/2004     Hep A, historic 03/11/2008, 08/03/2010     Influenza high dose, seasonal 09/21/2015, 10/19/2016, 10/20/2017, 10/17/2018     Influenza, inj, historic,unspecified 09/26/2007, 10/22/2008     Influenza, seasonal,quad inj 6-35 mos 09/25/2009, 09/10/2010, 10/05/2011, 09/14/2012, 09/14/2012, 09/20/2013, 09/19/2014     Pneumo Conj 13-V (2010&after) 07/22/2015     Pneumo Polysac 23-V 06/22/2010, 10/19/2016     Td,adult,historic,unspecified 03/11/2008     Tdap 03/11/2008     ZOSTER, LIVE 08/25/2012, 07/22/2015     ZOSTER, RECOMBINANT, IM  10/17/2018         Procedure Note - Trigger Point Injections    Consent obtained: verbal     Indication:  Chronic pain/fibromyalgia    5 trigger points identified.  Each trigger point swabbed x 3 with Betadine swab.  Each trigger point then injected with 2 ml of 1% Lidocaine (no epi).    Total volume injected:  10 ml    Complications:  None, patient tolerated procedure well.    Plan:  Discussed care with patient.  RTC for further injections in 30 days prn.          Electronically signed by Cammy Bui MD 10/20/18 9:10 AM

## 2021-06-21 NOTE — ANESTHESIA PREPROCEDURE EVALUATION
Anesthesia Evaluation      Patient summary reviewed   No history of anesthetic complications     Airway   Mallampati: II  Neck ROM: full   Pulmonary     breath sounds clear to auscultation  (+) COPD, shortness of breath (Chronic SOB from her COPD with exertion.  Pt uses oxygen at home with activity), decreased breath sounds, a smoker    ROS comment: Larynx Edema                         Cardiovascular   (+) hypertension, valvular problems/murmurs (Moderate AS) AS, dysrhythmias (a.fib, a.flutter), , hypercholesterolemia,     ECG reviewed  Rhythm: regular  Rate: normal,      PE comment: Distant,     Neuro/Psych    (+) neuromuscular disease (DM neuropathy, fibromyalgia),  depression, chronic pain    Endo/Other    (+) diabetes mellitus type 2, arthritis,      Comments: Anemia    GI/Hepatic/Renal    (+) GERD well controlled,       Comments: Melena  PUD     Other findings: Echo 8/17/18  Summary      When compared to the previous study dated 8/31/2017, no significant change.    The estimated left ventricular ejection fraction is 65%.    Normal right ventricular size and systolic function.    Moderate aortic stenosis with trace regurgitation.              Dental    (+) upper dentures                       Anesthesia Plan  Planned anesthetic: MAC  Pt prefers that we do not electively intubate her for the procedure if does not tolerate a MAC.  Will communicate this to Dr. Fernando.  ASA 4     Anesthetic plan and risks discussed with: patient    Post-op plan: routine recovery

## 2021-06-21 NOTE — LETTER
Letter by Samantha Alexandra RN at      Author: Samantha Alexandra RN Service: -- Author Type: --    Filed:  Encounter Date: 10/26/2020 Status: (Other)       [facility] Advance [facility] Planning       [address]  [address] [facility] [address]      Dear Mary Kay    You shared with me your interest in receiving information on Advance Care Planning and Health Care Directives. Discussing and making decisions about this part of our health is very important.  A Health Care Directive is a written document that outlines your goals, values, beliefs and choices for health care and medical treatment in the event you are unable to speak for yourself.     We greatly value the opportunity to assist you in documenting your choices and to honor your   wishes. Weve enclosed form and info to help you get started thinking about your values and goals. We have several options for additional resources:       Health Care Directives and Advance Care Planning resources can be viewed and printed   for free at our web site:  [email or web address].       Free group classes on Advance Care Planning and completing a Health Care Directive are available at multiple locations and times. These classes are led by trained staff who will provide information and guide you through a Health Care Directive.  They can also review, notarize and add your Health Care Directive to your medical record. Whitney for a class at [email or web address] or by calling [employer] at [phone] or toll free [phone].      COPIES of completed Health Care Directives can be brought or mailed to any of our   locations, including the address listed below. You can also email a copy to [email or web address] .      Email or call me at the contact information listed below for questions, assistance, or to   make an appointment to discuss creating a Health Care Directive. You can also contact   our [facility] Department for questions or  assistance.       Sincerely,     Samantha Alexandra

## 2021-06-21 NOTE — ANESTHESIA CARE TRANSFER NOTE
Last vitals:   Vitals:    11/06/18 1020   BP: 100/49   Pulse: 80   Resp: 16   Temp: 37.1  C (98.8  F)   SpO2: 98%     Patient's level of consciousness is awake  Spontaneous respirations: yes  Maintains airway independently: yes  Dentition unchanged: yes  Oropharynx: oropharynx clear of all foreign objects    QCDR Measures:  ASA# 20 - Surgical Safety Checklist: WHO surgical safety checklist completed prior to induction  PQRS# 430 - Adult PONV Prevention: 4558F - Pt received => 2 anti-emetic agents (different classes) preop & intraop  ASA# 8 - Peds PONV Prevention: NA - Not pediatric patient, not GA or 2 or more risk factors NOT present  PQRS# 424 - Berna-op Temp Management: 4559F - At least one body temp DOCUMENTED => 35.5C or 95.9F within required timeframe  PQRS# 426 - PACU Transfer Protocol: - Transfer of care checklist used  ASA# 14 - Acute Post-op Pain: ASA14B - Patient did NOT experience pain >= 7 out of 10

## 2021-06-21 NOTE — PROGRESS NOTES
"ASSESSMENT/PLAN:  1. Fibromyalgia  lidocaine 10 mg/mL (1 %) injection 10 mL   2. Chronic pain syndrome  oxyCODONE (ROXICODONE) 10 mg immediate release tablet   3. Nicotine Dependence  nicotine (NICODERM CQ) 21 mg/24 hr    nicotine (NICODERM CQ) 14 mg/24 hr    nicotine (NICODERM CQ) 7 mg/24 hr   4. Atrial fibrillation (H)  INR    CANCELED: INR       This is a 69 yo female with:  1.  Fibromyalgia/chronic pain syndrome - we will refill her Oxycodone - has not required increasing doses -  Will refill today;  Also , we do trigger point injections as this allows us to use less opiates for pain; see procedure  2;  Nicotine dependence - recognizes she needs to quit smoking - will prescribe her nicotine patches - reviewed use of these patches  I have counseled the patient for tobacco cessation and prescribed the patient a tobacco cessation medication and the follow up will occur  at the next visit.   3.  Atrial Fibrillation - due for INR - done today    No Follow-up on file.  Administrations This Visit     lidocaine 10 mg/mL (1 %) injection 10 mL     Admin Date  11/16/2018 Action  Given Dose  10 mL Route  Other Administered By  Marcos Lovett CMA                Medications Discontinued During This Encounter   Medication Reason     oxyCODONE (ROXICODONE) 10 mg immediate release tablet Reorder     Patient Instructions   Try stopping Omeprazole.       Chief Complaint:  Chief Complaint   Patient presents with     Injections     trigger point injection     INR Check     Medication Refill     needs smoking patches for sensitive skin        HPI:   Mary Kay Tejada is a 68 y.o. female c/o  Pain - here for injections - has benefitted from trigger point injections in past    Also - wants to stop smoking - needs patches for \"sensitive skin\"  Has tried in past - never successful    PMH:   Patient Active Problem List    Diagnosis Date Noted     Anemia due to blood loss, acute 07/18/2018     Diabetic polyneuropathy associated with type 2 " diabetes mellitus (H) 07/18/2018     Chronic anemia 06/27/2018     Cataract 11/06/2017     Bunion 07/19/2017     Callus of foot 07/19/2017     Nonrheumatic aortic valve stenosis 05/23/2017     COPD (chronic obstructive pulmonary disease) (H) 05/23/2017     DM neuropathy, type II diabetes mellitus (H) 05/19/2017     Chronic obstructive pulmonary disease with acute lower respiratory infection (H) 12/24/2016     Gastroenteritis 12/24/2016     Syncope 12/22/2016     Opacity of lung on imaging study 12/22/2016     Acute gastritis 12/22/2016     Osteoarthritis of both knees 08/22/2016     Osteoarthritis, hip, bilateral 06/20/2016     Primary osteoarthritis of left knee 06/20/2016     Candidal intertrigo 05/11/2016     Type 2 diabetes mellitus with hypoglycemia (H)      Aspiration pneumonia (H) 03/01/2016     Controlled substance agreement signed 11/23/2015     Cervical neck pain with evidence of disc disease 07/22/2015     Headache 07/17/2015     Hematoma of abdominal wall 07/05/2015     Skin lesion of face 05/22/2015     Tendonitis of wrist, right 04/22/2015     Menopause 04/06/2015     Flashers or floaters of right eye 02/23/2015     Tendonitis of wrist, left 01/21/2015     Trigger point of thoracic region 01/21/2015     Generalized osteoarthrosis, involving multiple sites 01/14/2015     Vertigo 12/17/2014     Rotator cuff tendonitis 12/17/2014     Abnormal Weight Loss      Osteoarthritis Of The Shoulder      Chronic Constipation      Onychomycosis Of The Toenails      Diarrhea      Ganglion Of The Left Wrist      Larynx Edema      Obesity      Malignant Vulvar Neoplasm      Medial Epicondylitis      Skin Lesion      Urinary Incontinence      Chronic Major Depression      Dry Eye Syndrome      Nicotine Dependence      Hypokalemia      Essential hypertension      Muscle Cramps In The Calf      Edema      Atrial flutter (H)      Lump In / On The Skin      Type 2 diabetes mellitus with hyperglycemia (H)      Chronic pain  syndrome      Paroxysmal Atrial Fibrillation      Fibromyalgia      Nausea      Abdominal Pain      Peptic Ulcer      COPD exacerbation (H)      Mixed hyperlipidemia      Chronic Reflux Esophagitis      Benign Adenomatous Polyp Of The Large Intestine      Past Medical History:   Diagnosis Date     Anemia      Aortic stenosis      Atrial fibrillation (H)      Atrial flutter (H)      Benign neoplasm of adenomatous polyp     large intestine      Chronic constipation      Chronic pain syndrome      COPD (chronic obstructive pulmonary disease) (H)     Oxygen at night      Depression      Diabetes mellitus (H)      Dry eye syndrome      Fibromyalgia      Ganglion     left wrist     GERD (gastroesophageal reflux disease)      Hyperlipidemia      Hypertension      Hypokalemia      Larynx edema      Lung disease      Medial epicondylitis      Onychomycosis      Osteoarthritis      Otitis Externa     Created by Conversion      Peptic ulcer      Type 2 diabetes mellitus (H)      Vulvar malignant neoplasm (H)      Past Surgical History:   Procedure Laterality Date     BREAST BIOPSY Right      BREAST BIOPSY Right 01/28/2015     BREAST BIOPSY Right 1/28/2015    Procedure: RIGHT BREAST BIOPSY AFTER WIRE LOCALIZATION AT 0940;  Surgeon: Renée Soriano MD;  Location: [facility];  Service:      ESOPHAGOGASTRODUODENOSCOPY N/A 11/6/2018    Procedure: ESOPHAGOGASTRODUODENOSCOPY;  Surgeon: Lit Fernando MD;  Location: [facility];  Service:      HYSTERECTOMY       NY ABLATE HEART DYSRHYTHM FOCUS      Description: Catheter Ablation Atrial Fibrillation;  Recorded: 07/31/2012;  Comments: 7/24/12 PVI with Dr. Gardiner and nilay to all 5 pulm veins and CTI fl ablation line as well.     NY BIOPSY OF BREAST, INCISIONAL      Description: Incisional Breast Biopsy;  Recorded: 11/13/2007;  Comments: benign     NY SUPRACERV ABD HYSTERECTOMY      Description: Supracervical Hysterectomy;  Proc Date: 01/01/1985;  Comments: some cervix  left!; ovaries intact; done for bleeding     CA TOTAL ABDOM HYSTERECTOMY      Description: Total Abdominal Hysterectomy;  Recorded: 2013;     CA TOTAL KNEE ARTHROPLASTY      Description: Total Knee Arthroplasty;  Recorded: 2007;     Social History     Socioeconomic History     Marital status:      Spouse name: Not on file     Number of children: Not on file     Years of education: Not on file     Highest education level: Not on file   Social Needs     Financial resource strain: Not on file     Food insecurity - worry: Not on file     Food insecurity - inability: Not on file     Transportation needs - medical: Not on file     Transportation needs - non-medical: Not on file   Occupational History     Not on file   Tobacco Use     Smoking status: Light Tobacco Smoker     Types: Cigarettes     Last attempt to quit: 2014     Years since quittin.9     Smokeless tobacco: Never Used     Tobacco comment: E-cig some day   Substance and Sexual Activity     Alcohol use: Yes     Comment: very little     Drug use: No     Sexual activity: Not on file   Other Topics Concern     Not on file   Social History Narrative     Not on file     Family History   Problem Relation Age of Onset     Heart failure Mother      Cancer Other         paternal HX-laryngeal      Alcoholism Sister      No Medical Problems Daughter      No Medical Problems Maternal Grandmother      No Medical Problems Maternal Grandfather      No Medical Problems Paternal Grandmother      No Medical Problems Paternal Grandfather      No Medical Problems Maternal Aunt      No Medical Problems Paternal Aunt      Alcoholism Sister      Alcoholism Brother      Alcohol abuse Father      Cancer Paternal Uncle         Gastric-Alcohol     Cancer Paternal Uncle         gastric-Alcohol     BRCA 1/2 Neg Hx      Breast cancer Neg Hx      Colon cancer Neg Hx      Endometrial cancer Neg Hx      Ovarian cancer Neg Hx        Meds:    Current Outpatient  "Medications:      ACCU-CHEK SOFTCLIX LANCETS lancets, TEST THREE TIMES DAILY, Disp: 300 each, Rfl: 3     albuterol (PROVENTIL) 2.5 mg /3 mL (0.083 %) nebulizer solution, Take 3 mL (2.5 mg total) by nebulization every 4 (four) hours as needed for wheezing or shortness of breath., Disp: 75 mL, Rfl: 12     atorvastatin (LIPITOR) 20 MG tablet, TAKE 1 TABLET(20 MG) BY MOUTH AT BEDTIME, Disp: 90 tablet, Rfl: 3     BD ULTRA-FINE SHORT PEN NEEDLE 31 gauge x 5/16\" Ndle, TEST FOUR TIMES DAILY WITH MEALS AND AT BEDTIME, Disp: 300 each, Rfl: 3     blood glucose test (ACCU-CHEK PEDRO PLUS TEST STRP) strips, Use 1 test strip to test 3 times daily. Dispense brand per patient's insurance at pharmacy discretion., Disp: 100 each, Rfl: 11     budesonide-formoterol (SYMBICORT) 160-4.5 mcg/actuation inhaler, Inhale 2 puffs 2 (two) times a day., Disp: 1 Inhaler, Rfl: 2     diltiazem (CARTIA XT) 120 MG 24 hr capsule, Take 1 capsule (120 mg total) by mouth daily., Disp: 90 capsule, Rfl: 1     furosemide (LASIX) 20 MG tablet, Take 1 tablet (20 mg total) by mouth daily., Disp: 90 tablet, Rfl: 3     gabapentin (NEURONTIN) 300 MG capsule, Take 300 mg by mouth daily before breakfast., Disp: , Rfl:      gabapentin (NEURONTIN) 300 MG capsule, Take 600 mg by mouth twice a day with lunch and supper., Disp: , Rfl:      insulin aspart U-100 (NOVOLOG) 100 unit/mL injection, Inject under the skin 3 (three) times a day before meals. Inject per sliding scale, Disp: , Rfl:      ipratropium-albuterol (DUO-NEB) 0.5-2.5 mg/3 mL nebulizer, INAHALE 1 VIAL IN NEBULIZER EVERY 4 HOURS AS NEEDED, Disp: 1440 mL, Rfl: 0     meclizine (ANTIVERT) 25 mg tablet, TAKE 1 TABLET(25 MG) BY MOUTH THREE TIMES DAILY AS NEEDED FOR DIZZINESS OR NAUSEA, Disp: 30 tablet, Rfl: 5     metFORMIN (GLUCOPHAGE) 500 MG tablet, TAKE 1 TABLET(500 MG) BY MOUTH TWICE DAILY WITH MEALS, Disp: 180 tablet, Rfl: 0     multivitamin (DAILY-FAYE) per tablet, Take 1 tablet by mouth daily., Disp: 90 " tablet, Rfl: 3     NOVOLOG FLEXPEN U-100 INSULIN 100 unit/mL injection pen, INJECT 10 UNITS EVERY MEAL AND AT BEDTIME, Disp: 15 mL, Rfl: 0     nystatin (NYSTOP) powder, Apply 1 application topically 2 (two) times a day as needed. 2-3 times to affected area(s)., Disp: 15 g, Rfl: 3     omeprazole (PRILOSEC) 20 MG capsule, TAKE 1 CAPSULE(20 MG) BY MOUTH TWICE DAILY BEFORE MEALS, Disp: 180 capsule, Rfl: 4     ONETOUCH ULTRA BLUE TEST STRIP strips, TEST THREE TIMES DAILY, Disp: 200 strip, Rfl: 11     oxyCODONE (ROXICODONE) 10 mg immediate release tablet, Take 1 tablet (10 mg total) by mouth every 6 (six) hours as needed., Disp: 120 tablet, Rfl: 0     PRECISION Q-I-D TEST strips, USE 1 STRIP THREE TIMES DAILY AS DIRECTED, Disp: 300 strip, Rfl: 2     ranitidine (ZANTAC) 150 MG tablet, Take 1 tablet (150 mg total) by mouth 2 (two) times a day., Disp: 180 tablet, Rfl: 2     sucralfate (CARAFATE) 1 gram tablet, TAKE 1 TABLET BY MOUTH FOUR TIMES DAILY(CRUSH TABLET AND MIX IT WITH A LITTLE WATER, THEN SWALLOW), Disp: 120 tablet, Rfl: 12     VENTOLIN HFA 90 mcg/actuation inhaler, INHALE 1 TO 2 PUFFS BY MOUTH EVERY 4 TO 6 HOURS AS NEEDED, Disp: 18 g, Rfl: 5     warfarin (COUMADIN) 5 MG tablet, Take 2.5 to 5mg (1/2 or 1 tabs) by mouth daily as directed.  Adjust dose based on INR. (Patient taking differently: Take 2.5-5 mg by mouth See Admin Instructions. Take 2.5 to 5mg (1/2 or 1 tabs) by mouth daily as directed.  Adjust dose based on INR. Take 2.5 mg Monday and 5 mg the rest of the week), Disp: 90 tablet, Rfl: 1     escitalopram oxalate (LEXAPRO) 10 MG tablet, TAKE 1 TABLET BY MOUTH EVERY DAY, Disp: 90 tablet, Rfl: 0     nicotine (NICODERM CQ) 14 mg/24 hr, Place 1 patch on the skin daily., Disp: 30 patch, Rfl: 0     nicotine (NICODERM CQ) 21 mg/24 hr, Place 1 patch on the skin daily., Disp: 30 patch, Rfl: 1     nicotine (NICODERM CQ) 7 mg/24 hr, Place 1 patch on the skin daily., Disp: 30 patch, Rfl: 0    Allergies:  Allergies  "  Allergen Reactions     Celebrex [Celecoxib] Rash     patient had butterfly rash - \"lupus-like\"       Latex Rash       ROS:  Pertinent positives as noted in HPI; otherwise 12 point ROS negative.      Physical Exam:  EXAM:  /56 (Patient Site: Right Arm, Patient Position: Sitting, Cuff Size: Adult Regular)   Pulse 68   Temp 98  F (36.7  C) (Oral)   Resp 20   Breastfeeding? No    Gen:  NAD, appears well, well-hydrated  HEENT:  TMs nl, oropharynx benign, nasal mucosa nl, conjunctiva clear  Neck:  Supple, no adenopathy, no thyromegaly, no carotid bruits, no JVD  Lungs:  Clear to auscultation bilaterally  Cor:  RRR no murmur  Abd:  Soft, nontender, BS+, no masses, no guarding or rebound, no HSM  Extr:  DJD - knees; no calf swelling/tenderness/erythema; tender to palpation at right suprascapular/right paracervical/parathoracic muscles  Neuro:  No asymmetry  Skin:  Warm/dry        Results:  Results for orders placed or performed in visit on 11/16/18   INR   Result Value Ref Range    INR 2.20 (H) 0.90 - 1.10       Procedure Note - Trigger Point Injections    Consent obtained: verbal    Indication:  Pain/Fibromyalgia    5 trigger points identified.  Each trigger point swabbed x 3 with Betadine swab.  Each trigger point then injected with 2 ml of 1% Lidocaine (no epi).    Total volume injected:  10 ml    Complications:  None, patient tolerated procedure well.    Plan:  Discussed care with patient.  RTC for further injections in 30 days prn.          "

## 2021-06-22 NOTE — PROGRESS NOTES
The [facility] Care Guide called the patient  today at the request of the PCP to discuss possible clinic care coordination enrollment. Patient declined enrollment into [facility]. At this time Care guide will discontinue outreach at this time. If she would like to enroll into [facility] at a later date an order can placed, and Care guide will outreach to the patient again.

## 2021-06-22 NOTE — PROGRESS NOTES
ASSESSMENT/PLAN:  1. Anemia due to blood loss, acute  HM1(CBC and Differential)    HM1 (CBC with Diff)   2. Atrial fibrillation (H)  INR   3. Chronic pain syndrome  oxyCODONE (ROXICODONE) 10 mg immediate release tablet   4. Fibromyalgia  lidocaine 10 mg/mL (1 %) injection 10 mL       This is a 67 yo female with:  1.  Anemia due to acute blood loss - recent hospitalization on 12/13 - 12/15 for symptoms.  She has follow up scheduled with GI as outpatient.  Thought to have GI bleeding - may need PillCam as well as colonoscopy.  2.  Atrial Fibrillation - on chronic warfarin therapy - continue to monitor INR - increased INR / anticoagulation promotes blood loss  3.  Chronic Pain syndrome - takes Oxycodone - same dose for long time - hasn't been able to wean, but has kept use stable  4.  Fibromyalgia - trigger point pain - has benefitted from trigger point injections to keep use of opiates more minimal - here for monthly injections today - in right upper back - see note    Return in about 4 weeks (around 1/18/2019).  Administrations This Visit     lidocaine 10 mg/mL (1 %) injection 10 mL     Admin Date  12/21/2018 Action  Given Dose  10 mL Route  Other Administered By  Marcos Lovett CMA                Medications Discontinued During This Encounter   Medication Reason     oxyCODONE (ROXICODONE) 10 mg immediate release tablet Reorder     There are no Patient Instructions on file for this visit.    Chief Complaint:  Chief Complaint   Patient presents with     Injections     Trigger point     Medication Refill       HPI:   Mary Kay Tejada is a 68 y.o. female c/o  1. Hospitalized for GI bleeding - 12/13-12/15 v- no active bleeding identified; referred as outpatient to GI - vitals are stable - okay to follow  2.  Pain in right upper back - chronic                   PMH:   Patient Active Problem List    Diagnosis Date Noted     Chronic a-fib (H)      Dyslipidemia      Upper GI bleed 12/13/2018     Melena 12/13/2018     Anemia  due to blood loss, acute 07/18/2018     Diabetic polyneuropathy associated with type 2 diabetes mellitus (H) 07/18/2018     Chronic anemia 06/27/2018     Cataract 11/06/2017     Bunion 07/19/2017     Callus of foot 07/19/2017     Nonrheumatic aortic valve stenosis 05/23/2017     COPD (chronic obstructive pulmonary disease) (H) 05/23/2017     DM neuropathy, type II diabetes mellitus (H) 05/19/2017     Chronic obstructive pulmonary disease with acute lower respiratory infection (H) 12/24/2016     Gastroenteritis 12/24/2016     Syncope 12/22/2016     Opacity of lung on imaging study 12/22/2016     Acute gastritis 12/22/2016     Osteoarthritis of both knees 08/22/2016     Osteoarthritis, hip, bilateral 06/20/2016     Primary osteoarthritis of left knee 06/20/2016     Candidal intertrigo 05/11/2016     Type 2 diabetes mellitus with hypoglycemia (H)      Aspiration pneumonia (H) 03/01/2016     Controlled substance agreement signed 11/23/2015     Cervical neck pain with evidence of disc disease 07/22/2015     Headache 07/17/2015     Hematoma of abdominal wall 07/05/2015     Skin lesion of face 05/22/2015     Tendonitis of wrist, right 04/22/2015     Menopause 04/06/2015     Flashers or floaters of right eye 02/23/2015     Tendonitis of wrist, left 01/21/2015     Trigger point of thoracic region 01/21/2015     Generalized osteoarthrosis, involving multiple sites 01/14/2015     Vertigo 12/17/2014     Rotator cuff tendonitis 12/17/2014     Abnormal Weight Loss      Osteoarthritis Of The Shoulder      Chronic Constipation      Onychomycosis Of The Toenails      Diarrhea      Ganglion Of The Left Wrist      Larynx Edema      Obesity      Malignant Vulvar Neoplasm      Medial Epicondylitis      Skin Lesion      Urinary Incontinence      Chronic Major Depression      Dry Eye Syndrome      Nicotine Dependence      Hypokalemia      Essential hypertension      Muscle Cramps In The Calf      Edema      Atrial flutter (H)      Lump In  / On The Skin      Type 2 diabetes mellitus with hyperglycemia (H)      Chronic pain syndrome      Paroxysmal Atrial Fibrillation      Fibromyalgia      Nausea      Abdominal Pain      Peptic Ulcer      COPD exacerbation (H)      Mixed hyperlipidemia      Chronic Reflux Esophagitis      Benign Adenomatous Polyp Of The Large Intestine      Past Medical History:   Diagnosis Date     Anemia      Aortic stenosis      Atrial fibrillation (H)      Atrial flutter (H)      Benign neoplasm of adenomatous polyp     large intestine      Chronic constipation      Chronic pain syndrome      COPD (chronic obstructive pulmonary disease) (H)     Oxygen at night      Depression      Diabetes mellitus (H)      Dry eye syndrome      Fibromyalgia      Ganglion     left wrist     GERD (gastroesophageal reflux disease)      Hyperlipidemia      Hypertension      Hypokalemia      Larynx edema      Lung disease      Medial epicondylitis      Onychomycosis      Osteoarthritis      Otitis Externa     Created by Conversion      Peptic ulcer      Type 2 diabetes mellitus (H)      Vulvar malignant neoplasm (H)      Past Surgical History:   Procedure Laterality Date     BREAST BIOPSY Right      BREAST BIOPSY Right 01/28/2015     BREAST BIOPSY Right 1/28/2015    Procedure: RIGHT BREAST BIOPSY AFTER WIRE LOCALIZATION AT 0940;  Surgeon: Renée Soriano MD;  Location: [facility] [place];  Service:      ESOPHAGOGASTRODUODENOSCOPY N/A 11/6/2018    Procedure: ESOPHAGOGASTRODUODENOSCOPY;  Surgeon: Lit Fernando MD;  Location: [facility] [place];  Service:      HYSTERECTOMY       PA ABLATE HEART DYSRHYTHM FOCUS      Description: Catheter Ablation Atrial Fibrillation;  Recorded: 07/31/2012;  Comments: 7/24/12 PVI with Dr. Bansal to all 5 pulm veins and CTI fl ablation line as well.     PA BIOPSY OF BREAST, INCISIONAL      Description: Incisional Breast Biopsy;  Recorded: 11/13/2007;  Comments: benign     PA SUPRACERV ABD HYSTERECTOMY       Description: Supracervical Hysterectomy;  Proc Date: 1985;  Comments: some cervix left!; ovaries intact; done for bleeding     CO TOTAL ABDOM HYSTERECTOMY      Description: Total Abdominal Hysterectomy;  Recorded: 2013;     CO TOTAL KNEE ARTHROPLASTY      Description: Total Knee Arthroplasty;  Recorded: 2007;     Social History     Socioeconomic History     Marital status:      Spouse name: Not on file     Number of children: Not on file     Years of education: Not on file     Highest education level: Not on file   Social Needs     Financial resource strain: Not on file     Food insecurity - worry: Not on file     Food insecurity - inability: Not on file     Transportation needs - medical: Not on file     Transportation needs - non-medical: Not on file   Occupational History     Not on file   Tobacco Use     Smoking status: Light Tobacco Smoker     Types: Cigarettes     Last attempt to quit: 2014     Years since quittin.9     Smokeless tobacco: Never Used     Tobacco comment: E-cig some day   Substance and Sexual Activity     Alcohol use: Yes     Comment: very little     Drug use: No     Sexual activity: Not on file   Other Topics Concern     Not on file   Social History Narrative     Not on file     Family History   Problem Relation Age of Onset     Heart failure Mother      Cancer Other         paternal HX-laryngeal      Alcoholism Sister      No Medical Problems Daughter      No Medical Problems Maternal Grandmother      No Medical Problems Maternal Grandfather      No Medical Problems Paternal Grandmother      No Medical Problems Paternal Grandfather      No Medical Problems Maternal Aunt      No Medical Problems Paternal Aunt      Alcoholism Sister      Alcoholism Brother      Alcohol abuse Father      Cancer Paternal Uncle         Gastric-Alcohol     Cancer Paternal Uncle         gastric-Alcohol     BRCA 1/2 Neg Hx      Breast cancer Neg Hx      Colon cancer Neg Hx       "Endometrial cancer Neg Hx      Ovarian cancer Neg Hx        Meds:    Current Outpatient Medications:      ACCU-CHEK SOFTCLIX LANCETS lancets, TEST THREE TIMES DAILY, Disp: 300 each, Rfl: 3     albuterol (PROVENTIL) 2.5 mg /3 mL (0.083 %) nebulizer solution, Take 3 mL (2.5 mg total) by nebulization every 4 (four) hours as needed for wheezing or shortness of breath., Disp: 75 mL, Rfl: 12     atorvastatin (LIPITOR) 20 MG tablet, TAKE 1 TABLET(20 MG) BY MOUTH AT BEDTIME, Disp: 90 tablet, Rfl: 3     BD ULTRA-FINE SHORT PEN NEEDLE 31 gauge x 5/16\" Ndle, TEST FOUR TIMES DAILY WITH MEALS AND AT BEDTIME, Disp: 300 each, Rfl: 3     blood glucose test (ACCU-CHEK PEDRO PLUS TEST STRP) strips, Use 1 test strip to test 3 times daily. Dispense brand per patient's insurance at pharmacy discretion., Disp: 100 each, Rfl: 11     budesonide-formoterol (SYMBICORT) 160-4.5 mcg/actuation inhaler, Inhale 2 puffs 2 (two) times a day., Disp: 1 Inhaler, Rfl: 2     diltiazem (CARTIA XT) 120 MG 24 hr capsule, Take 1 capsule (120 mg total) by mouth daily., Disp: 90 capsule, Rfl: 1     furosemide (LASIX) 20 MG tablet, Take 20 mg by mouth daily as needed., Disp: , Rfl:      gabapentin (NEURONTIN) 300 MG capsule, Take 300 mg by mouth daily before breakfast., Disp: , Rfl:      gabapentin (NEURONTIN) 300 MG capsule, Take 600 mg by mouth twice a day with lunch and supper., Disp: , Rfl:      insulin aspart U-100 (NOVOLOG) 100 unit/mL injection, Inject under the skin 3 (three) times a day before meals. Inject per sliding scale, Disp: , Rfl:      ipratropium-albuterol (DUO-NEB) 0.5-2.5 mg/3 mL nebulizer, INAHALE 1 VIAL IN NEBULIZER EVERY 4 HOURS AS NEEDED, Disp: 1440 mL, Rfl: 0     meclizine (ANTIVERT) 25 mg tablet, TAKE 1 TABLET(25 MG) BY MOUTH THREE TIMES DAILY AS NEEDED FOR DIZZINESS OR NAUSEA, Disp: 30 tablet, Rfl: 5     metFORMIN (GLUCOPHAGE) 500 MG tablet, TAKE 1 TABLET(500 MG) BY MOUTH TWICE DAILY WITH MEALS, Disp: 180 tablet, Rfl: 0     " "multivitamin (DAILY-FAYE) per tablet, Take 1 tablet by mouth daily., Disp: 90 tablet, Rfl: 3     nystatin (NYSTOP) powder, Apply 1 application topically 2 (two) times a day as needed. 2-3 times to affected area(s)., Disp: 15 g, Rfl: 3     ONETOUCH ULTRA BLUE TEST STRIP strips, TEST THREE TIMES DAILY, Disp: 200 strip, Rfl: 11     oxyCODONE (ROXICODONE) 10 mg immediate release tablet, Take 1 tablet (10 mg total) by mouth every 6 (six) hours as needed., Disp: 120 tablet, Rfl: 0     polyethylene glycol (MIRALAX) 17 gram packet, Take 1 packet (17 g total) by mouth daily., Disp: , Rfl: 0     PRECISION Q-I-D TEST strips, USE 1 STRIP THREE TIMES DAILY AS DIRECTED, Disp: 300 strip, Rfl: 2     ranitidine (ZANTAC) 150 MG tablet, Take 1 tablet (150 mg total) by mouth 2 (two) times a day., Disp: 180 tablet, Rfl: 2     sucralfate (CARAFATE) 1 gram tablet, TAKE 1 TABLET BY MOUTH FOUR TIMES DAILY(CRUSH TABLET AND MIX IT WITH A LITTLE WATER, THEN SWALLOW), Disp: 120 tablet, Rfl: 12     warfarin (COUMADIN/JANTOVEN) 5 MG tablet, Take 2.5 to 5mg (1/2 or 1 tabs) by mouth daily as directed.  Adjust dose based on INR. Take 2.5 mg Monday and 5 mg the rest of the week, to be started in 2 day, hold if dark stool., Disp: , Rfl:      NOVOLOG FLEXPEN U-100 INSULIN 100 unit/mL injection pen, INJECT 10 UNITS EVERY MEAL AND AT BEDTIME, Disp: 15 mL, Rfl: 0     VENTOLIN HFA 90 mcg/actuation inhaler, INHALE 1 TO 2 PUFFS BY MOUTH EVERY 4 TO 6 HOURS AS NEEDED, Disp: 18 g, Rfl: 5    Allergies:  Allergies   Allergen Reactions     Celebrex [Celecoxib] Rash     patient had butterfly rash - \"lupus-like\"       Latex Rash       ROS:  Pertinent positives as noted in HPI; otherwise 12 point ROS negative.      Physical Exam:  EXAM:  /50 (Patient Site: Right Arm, Patient Position: Sitting, Cuff Size: Adult Regular)   Pulse 79   Resp 20   Wt 172 lb 7 oz (78.2 kg)   SpO2 97%   Breastfeeding? No   BMI 28.70 kg/m     Gen:  NAD, appears well, " well-hydrated, pale  HEENT:  TMs nl, oropharynx benign, nasal mucosa nl, conjunctiva clear  Neck:  Supple, no adenopathy, no thyromegaly, no carotid bruits, no JVD  Lungs:  Clear to auscultation bilaterally  Cor:  RRR no murmur  Abd:  Soft, nontender, BS+, no masses, no guarding or rebound, no HSM  Extr: DJD changes large joints, multiple trigger points right posterior scapula  Neuro:  No asymmetry, Nl motor tone/strength, nl sensation, reflexes =, gait nl, nl coordination, CN intact,   Skin:  Warm/dry        Results:  Results for orders placed or performed in visit on 12/21/18   INR   Result Value Ref Range    INR 1.40 (H) 0.90 - 1.10           Procedure Note - Trigger Point Injections    Consent obtained: verbal    Indication:  Fibromyalgia/trigger point pain/chronic pain    5 trigger points identified.  Each trigger point swabbed x 3 with Betadine swab.  Each trigger point then injected with 2 ml of 1% Lidocaine (no epi).    Total volume injected:  10 ml    Complications:  None, patient tolerated procedure well.    Plan:  Discussed care with patient.  RTC for further injections in 30 days prn.

## 2021-06-23 ENCOUNTER — COMMUNICATION - HEALTHEAST (OUTPATIENT)
Dept: ANTICOAGULATION | Facility: CLINIC | Age: 71
End: 2021-06-23

## 2021-06-23 DIAGNOSIS — I48.91 ATRIAL FIBRILLATION, UNSPECIFIED TYPE (H): ICD-10-CM

## 2021-06-23 NOTE — TELEPHONE ENCOUNTER
RN cannot approve Refill Request    RN can NOT refill this medication med is not covered by policy/route to provider.     Last office visit: 12/21/2018 Cammy Bui MD Last Physical: 10/17/2018 Last MTM visit: Visit date not found Last visit same specialty: 12/21/2018 Cammy Bui MD.  Next visit within 3 mo: Visit date not found  Next physical within 3 mo: Visit date not found      Eliane Moore, [facility] Connection Triage/Med Refill 1/10/2019    Requested Prescriptions   Pending Prescriptions Disp Refills     amoxicillin (AMOXIL) 500 MG tablet [Pharmacy Med Name: AMOXICILLIN 500MG TABLETS] 30 tablet 0     Sig: TAKE 1 TABLET(500 MG) BY MOUTH THREE TIMES DAILY FOR 10 DAYS    There is no refill protocol information for this order        predniSONE (DELTASONE) 10 mg tablet [Pharmacy Med Name: PREDNISONE 10MG** TABLETS] 42 tablet 0     Sig: TAKE 6 TABLETS EVERY DAY FOR 2 DAYS, 5 FOR 2 DAYS, 4 FOR 2 DAYS, 3 FOR 2 DAYS, 2 FOR 2 DAYS, THEN 1 FOR 2 DAYS.    There is no refill protocol information for this order

## 2021-06-23 NOTE — PROGRESS NOTES
"ASSESSMENT/PLAN:  1. Fibromyalgia  lidocaine 10 mg/mL (1 %) injection 10 mL   2. Chronic pain syndrome  oxyCODONE (ROXICODONE) 10 mg immediate release tablet   3. Atrial fibrillation (H)  INR   4. Primary osteoarthritis of left hip  Ambulatory referral to Orthopedics       This is a 70 yo female with:  1.  Fibromyalgia/chronic pain - she has benefitted from monthly Lidocaine trigger point injections - here for further injections - she does use chronic opiate therapy as well - and she has not required any increase in these doses.  Due for refill today - sent. See procedure note for injections.  2.  Atrial Fibrillation - due for INR - followed by anticoagulation team  3.  Primary osteoarthritis of left hip - desires referral to Ortho - tells me she is contemplating hip arthroplasty.  Will refer to Ortho for further evaluation.  4.  Underlying anemia, undergoing workup for GI bleeding - this would need to be stable prior to any contemplated surgery.    Return in about 1 month (around 2/16/2019).  Administrations This Visit     lidocaine 10 mg/mL (1 %) injection 10 mL     Admin Date  01/16/2019 Action  Given Dose  10 mL Route  Other Administered By  Marcos Lovett CMA                Medications Discontinued During This Encounter   Medication Reason     oxyCODONE (ROXICODONE) 10 mg immediate release tablet Reorder     There are no Patient Instructions on file for this visit.    Chief Complaint:  Chief Complaint   Patient presents with     Injections     Trigger point injection      INR Check     Medication Refill       HPI:   Mary Kay Tejada is a 69 y.o. female c/o  Having increasing hip pain - left   Thinks it's time to see ORtho - Dr. Melendez told her years ago that she'd need to have it done    Neck/back - right side - never gets better    Stools are much better - \"I look\"  Has been eating better  Staying away from constipating stuff  Goes for colonoscopy in March 2019 - \"finally\"          PMH:   Patient Active Problem " List    Diagnosis Date Noted     Chronic a-fib (H)      Dyslipidemia      Upper GI bleed 12/13/2018     Melena 12/13/2018     Anemia due to blood loss, acute 07/18/2018     Diabetic polyneuropathy associated with type 2 diabetes mellitus (H) 07/18/2018     Chronic anemia 06/27/2018     Cataract 11/06/2017     Bunion 07/19/2017     Callus of foot 07/19/2017     Nonrheumatic aortic valve stenosis 05/23/2017     COPD (chronic obstructive pulmonary disease) (H) 05/23/2017     DM neuropathy, type II diabetes mellitus (H) 05/19/2017     Chronic obstructive pulmonary disease with acute lower respiratory infection (H) 12/24/2016     Gastroenteritis 12/24/2016     Syncope 12/22/2016     Opacity of lung on imaging study 12/22/2016     Acute gastritis 12/22/2016     Osteoarthritis of both knees 08/22/2016     Osteoarthritis, hip, bilateral 06/20/2016     Primary osteoarthritis of left knee 06/20/2016     Candidal intertrigo 05/11/2016     Type 2 diabetes mellitus with hypoglycemia (H)      Aspiration pneumonia (H) 03/01/2016     Controlled substance agreement signed 11/23/2015     Cervical neck pain with evidence of disc disease 07/22/2015     Headache 07/17/2015     Hematoma of abdominal wall 07/05/2015     Skin lesion of face 05/22/2015     Tendonitis of wrist, right 04/22/2015     Menopause 04/06/2015     Flashers or floaters of right eye 02/23/2015     Tendonitis of wrist, left 01/21/2015     Trigger point of thoracic region 01/21/2015     Generalized osteoarthrosis, involving multiple sites 01/14/2015     Vertigo 12/17/2014     Rotator cuff tendonitis 12/17/2014     Abnormal Weight Loss      Osteoarthritis Of The Shoulder      Chronic Constipation      Onychomycosis Of The Toenails      Diarrhea      Ganglion Of The Left Wrist      Larynx Edema      Obesity      Malignant Vulvar Neoplasm      Medial Epicondylitis      Skin Lesion      Urinary Incontinence      Chronic Major Depression      Dry Eye Syndrome      Nicotine  Dependence      Hypokalemia      Essential hypertension      Muscle Cramps In The Calf      Edema      Atrial flutter (H)      Lump In / On The Skin      Type 2 diabetes mellitus with hyperglycemia (H)      Chronic pain syndrome      Paroxysmal Atrial Fibrillation      Fibromyalgia      Nausea      Abdominal Pain      Peptic Ulcer      COPD exacerbation (H)      Mixed hyperlipidemia      Chronic Reflux Esophagitis      Benign Adenomatous Polyp Of The Large Intestine      Past Medical History:   Diagnosis Date     Anemia      Aortic stenosis      Atrial fibrillation (H)      Atrial flutter (H)      Benign neoplasm of adenomatous polyp     large intestine      Chronic constipation      Chronic pain syndrome      COPD (chronic obstructive pulmonary disease) (H)     Oxygen at night      Depression      Diabetes mellitus (H)      Dry eye syndrome      Fibromyalgia      Ganglion     left wrist     GERD (gastroesophageal reflux disease)      Hyperlipidemia      Hypertension      Hypokalemia      Larynx edema      Lung disease      Medial epicondylitis      Onychomycosis      Osteoarthritis      Otitis Externa     Created by Conversion      Peptic ulcer      Type 2 diabetes mellitus (H)      Vulvar malignant neoplasm (H)      Past Surgical History:   Procedure Laterality Date     BREAST BIOPSY Right      BREAST BIOPSY Right 01/28/2015     BREAST BIOPSY Right 1/28/2015    Procedure: RIGHT BREAST BIOPSY AFTER WIRE LOCALIZATION AT 0940;  Surgeon: Renée Soriano MD;  Location: [facility];  Service:      ESOPHAGOGASTRODUODENOSCOPY N/A 11/6/2018    Procedure: ESOPHAGOGASTRODUODENOSCOPY;  Surgeon: Lit Fernando MD;  Location: [facility];  Service:      HYSTERECTOMY       NC ABLATE HEART DYSRHYTHM FOCUS      Description: Catheter Ablation Atrial Fibrillation;  Recorded: 07/31/2012;  Comments: 7/24/12 PVI with Dr. Gardiner and nilay to all 5 pulm veins and CTI fl ablation line as well.     NC BIOPSY OF BREAST,  INCISIONAL      Description: Incisional Breast Biopsy;  Recorded: 2007;  Comments: benign     RI SUPRACERV ABD HYSTERECTOMY      Description: Supracervical Hysterectomy;  Proc Date: 1985;  Comments: some cervix left!; ovaries intact; done for bleeding     RI TOTAL ABDOM HYSTERECTOMY      Description: Total Abdominal Hysterectomy;  Recorded: 2013;     RI TOTAL KNEE ARTHROPLASTY      Description: Total Knee Arthroplasty;  Recorded: 2007;     Social History     Socioeconomic History     Marital status:      Spouse name: Not on file     Number of children: Not on file     Years of education: Not on file     Highest education level: Not on file   Social Needs     Financial resource strain: Not on file     Food insecurity - worry: Not on file     Food insecurity - inability: Not on file     Transportation needs - medical: Not on file     Transportation needs - non-medical: Not on file   Occupational History     Not on file   Tobacco Use     Smoking status: Light Tobacco Smoker     Types: Cigarettes     Last attempt to quit: 2014     Years since quittin.0     Smokeless tobacco: Never Used     Tobacco comment: E-cig some day   Substance and Sexual Activity     Alcohol use: Yes     Comment: very little     Drug use: No     Sexual activity: Not on file   Other Topics Concern     Not on file   Social History Narrative     Not on file     Family History   Problem Relation Age of Onset     Heart failure Mother      Cancer Other         paternal HX-laryngeal      Alcoholism Sister      No Medical Problems Daughter      No Medical Problems Maternal Grandmother      No Medical Problems Maternal Grandfather      No Medical Problems Paternal Grandmother      No Medical Problems Paternal Grandfather      No Medical Problems Maternal Aunt      No Medical Problems Paternal Aunt      Alcoholism Sister      Alcoholism Brother      Alcohol abuse Father      Cancer Paternal Uncle          "Gastric-Alcohol     Cancer Paternal Uncle         gastric-Alcohol     BRCA 1/2 Neg Hx      Breast cancer Neg Hx      Colon cancer Neg Hx      Endometrial cancer Neg Hx      Ovarian cancer Neg Hx        Meds:    Current Outpatient Medications:      ACCU-CHEK SOFTCLIX LANCETS lancets, TEST THREE TIMES DAILY, Disp: 300 each, Rfl: 3     albuterol (PROVENTIL) 2.5 mg /3 mL (0.083 %) nebulizer solution, Take 3 mL (2.5 mg total) by nebulization every 4 (four) hours as needed for wheezing or shortness of breath., Disp: 75 mL, Rfl: 12     amoxicillin (AMOXIL) 500 MG tablet, TAKE 1 TABLET(500 MG) BY MOUTH THREE TIMES DAILY FOR 10 DAYS, Disp: 30 tablet, Rfl: 0     atorvastatin (LIPITOR) 20 MG tablet, TAKE 1 TABLET(20 MG) BY MOUTH AT BEDTIME, Disp: 90 tablet, Rfl: 3     BD ULTRA-FINE SHORT PEN NEEDLE 31 gauge x 5/16\" Ndle, TEST FOUR TIMES DAILY WITH MEALS AND AT BEDTIME, Disp: 300 each, Rfl: 3     blood glucose test (ACCU-CHEK PEDRO PLUS TEST STRP) strips, Use 1 test strip to test 3 times daily. Dispense brand per patient's insurance at pharmacy discretion., Disp: 100 each, Rfl: 11     budesonide-formoterol (SYMBICORT) 160-4.5 mcg/actuation inhaler, Inhale 2 puffs 2 (two) times a day., Disp: 1 Inhaler, Rfl: 2     diltiazem (CARTIA XT) 120 MG 24 hr capsule, Take 1 capsule (120 mg total) by mouth daily., Disp: 90 capsule, Rfl: 1     furosemide (LASIX) 20 MG tablet, Take 20 mg by mouth daily as needed., Disp: , Rfl:      gabapentin (NEURONTIN) 300 MG capsule, Take 300 mg by mouth daily before breakfast., Disp: , Rfl:      gabapentin (NEURONTIN) 300 MG capsule, Take 600 mg by mouth twice a day with lunch and supper., Disp: , Rfl:      insulin aspart U-100 (NOVOLOG) 100 unit/mL injection, Inject under the skin 3 (three) times a day before meals. Inject per sliding scale, Disp: , Rfl:      ipratropium-albuterol (DUO-NEB) 0.5-2.5 mg/3 mL nebulizer, INAHALE 1 VIAL IN NEBULIZER EVERY 4 HOURS AS NEEDED, Disp: 1440 mL, Rfl: 0     meclizine " (ANTIVERT) 25 mg tablet, TAKE 1 TABLET(25 MG) BY MOUTH THREE TIMES DAILY AS NEEDED FOR DIZZINESS OR NAUSEA, Disp: 30 tablet, Rfl: 5     multivitamin (DAILY-FAYE) per tablet, Take 1 tablet by mouth daily., Disp: 90 tablet, Rfl: 3     NOVOLOG FLEXPEN U-100 INSULIN 100 unit/mL injection pen, INJECT 10 UNITS EVERY MEAL AND AT BEDTIME, Disp: 15 mL, Rfl: 0     nystatin (NYSTOP) powder, Apply 1 application topically 2 (two) times a day as needed. 2-3 times to affected area(s)., Disp: 15 g, Rfl: 3     ONETOUCH ULTRA BLUE TEST STRIP strips, TEST THREE TIMES DAILY, Disp: 200 strip, Rfl: 11     polyethylene glycol (MIRALAX) 17 gram packet, Take 1 packet (17 g total) by mouth daily., Disp: , Rfl: 0     PRECISION Q-I-D TEST strips, USE 1 STRIP THREE TIMES DAILY AS DIRECTED, Disp: 300 strip, Rfl: 2     predniSONE (DELTASONE) 10 mg tablet, TAKE 6 TABLETS EVERY DAY FOR 2 DAYS, 5 FOR 2 DAYS, 4 FOR 2 DAYS, 3 FOR 2 DAYS, 2 FOR 2 DAYS, THEN 1 FOR 2 DAYS., Disp: 42 tablet, Rfl: 0     sucralfate (CARAFATE) 1 gram tablet, TAKE 1 TABLET BY MOUTH FOUR TIMES DAILY(CRUSH TABLET AND MIX IT WITH A LITTLE WATER, THEN SWALLOW), Disp: 120 tablet, Rfl: 12     VENTOLIN HFA 90 mcg/actuation inhaler, INHALE 1 TO 2 PUFFS BY MOUTH EVERY 4 TO 6 HOURS AS NEEDED, Disp: 18 g, Rfl: 5     warfarin (COUMADIN/JANTOVEN) 5 MG tablet, Take 2.5 to 5mg (1/2 or 1 tabs) by mouth daily as directed.  Adjust dose based on INR. Take 2.5 mg Monday and 5 mg the rest of the week, to be started in 2 day, hold if dark stool., Disp: , Rfl:      metFORMIN (GLUCOPHAGE) 500 MG tablet, TAKE 1 TABLET(500 MG) BY MOUTH TWICE DAILY WITH MEALS, Disp: 180 tablet, Rfl: 3     oxyCODONE (ROXICODONE) 10 mg immediate release tablet, Take 1 tablet (10 mg total) by mouth every 6 (six) hours as needed., Disp: 120 tablet, Rfl: 0     ranitidine (ZANTAC) 150 MG tablet, TAKE 1 TABLET(150 MG) BY MOUTH TWICE DAILY, Disp: 180 tablet, Rfl: 3    Allergies:  Allergies   Allergen Reactions     Celebrex  "[Celecoxib] Rash     patient had butterfly rash - \"lupus-like\"       Latex Rash       ROS:  Pertinent positives as noted in HPI; otherwise 12 point ROS negative.      Physical Exam:  EXAM:  BP 96/60 (Patient Site: Left Arm, Patient Position: Sitting, Cuff Size: Adult Regular)   Pulse 67   Resp 20   Wt 171 lb 5 oz (77.7 kg)   SpO2 97%   Breastfeeding? No   BMI 28.51 kg/m     Gen:  NAD, appears well, well-hydrated  HEENT:  TMs nl, oropharynx benign, nasal mucosa nl, conjunctiva clear  Neck:  Supple, no adenopathy, no thyromegaly, no carotid bruits, no JVD  Lungs:  Clear to auscultation bilaterally  Cor: irreg,  no murmur  Abd:  Soft, nontender, BS+, no masses, no guarding or rebound, no HSM  Back:  Tender to palpation at trigger points R>L in paracervical, suprascapular region  Extr:  DJD - multiple joints; significantly reduced ROM left hip  Neuro:  No asymmetry  Skin:  Warm/dry        Results:  Results for orders placed or performed in visit on 01/16/19   INR   Result Value Ref Range    INR 2.30 (H) 0.90 - 1.10       Procedure Note - Trigger Point Injections    Consent obtained: verbal    Indication:  Pain management for fibromyalgia/trigger point pain    5 trigger points identified.  Each trigger point swabbed x 3 with Betadine swab.  Each trigger point then injected with 2 ml of 1% Lidocaine (no epi).    Total volume injected:  10 ml    Complications:  None, patient tolerated procedure well.    Plan:  Discussed care with patient.  RTC for further injections in 30 days prn.          "

## 2021-06-23 NOTE — TELEPHONE ENCOUNTER
RN cannot approve Refill Request    RN can NOT refill this medication Protocol failed and NO refill given.     Last office visit: 1/16/2019 Cammy Bui MD Last Physical: 10/17/2018 Last MTM visit: Visit date not found Last visit same specialty: 1/16/2019 Cammy Bui MD.  Next visit within 3 mo: Visit date not found  Next physical within 3 mo: Visit date not found      Eliane Moore, [facility] Triage/Med Refill 1/17/2019    Requested Prescriptions   Pending Prescriptions Disp Refills     metFORMIN (GLUCOPHAGE) 500 MG tablet [Pharmacy Med Name: METFORMIN 500MG TABLETS] 180 tablet 0     Sig: TAKE 1 TABLET(500 MG) BY MOUTH TWICE DAILY WITH MEALS    Metformin Refill Protocol Failed - 1/16/2019  2:16 PM       Failed - Microalbumin in last year     Microalbumin, Random Urine   Date Value Ref Range Status   12/21/2015 2.43 (H) 0.00 - 1.99 mg/dL Final                 Passed - Blood pressure in last 12 months    BP Readings from Last 1 Encounters:   01/16/19 96/60            Passed - LFT or AST or ALT in last 12 months    Albumin   Date Value Ref Range Status   12/13/2018 3.8 3.5 - 5.0 g/dL Final     Bilirubin, Total   Date Value Ref Range Status   12/13/2018 0.6 0.0 - 1.0 mg/dL Final     Bilirubin, Direct   Date Value Ref Range Status   12/13/2018 0.2 <=0.5 mg/dL Final     Alkaline Phosphatase   Date Value Ref Range Status   12/13/2018 78 45 - 120 U/L Final     AST   Date Value Ref Range Status   12/13/2018 18 0 - 40 U/L Final     ALT   Date Value Ref Range Status   12/13/2018 16 0 - 45 U/L Final     Protein, Total   Date Value Ref Range Status   12/13/2018 6.9 6.0 - 8.0 g/dL Final               Passed - GFR or Serum Creatinine in last 6 months    GFR MDRD Non Af Amer   Date Value Ref Range Status   12/14/2018 >60 >60 mL/min/1.73m2 Final     GFR MDRD Af Amer   Date Value Ref Range Status   12/14/2018 >60 >60 mL/min/1.73m2 Final            Passed - Visit with PCP or prescribing  provider visit in last 6 months or next 3 months    Last office visit with prescriber/PCP: 1/16/2019 OR same dept: 1/16/2019 Cammy Bui MD OR same specialty: 1/16/2019 Cammy Bui MD Last physical: 10/17/2018 Last MTM visit: Visit date not found         Next appt within 3 mo: Visit date not found  Next physical within 3 mo: Visit date not found  Prescriber OR PCP: Cammy Bui MD  Last diagnosis associated with med order: 1. Type 2 diabetes mellitus (H)  - metFORMIN (GLUCOPHAGE) 500 MG tablet [Pharmacy Med Name: METFORMIN 500MG TABLETS]; TAKE 1 TABLET(500 MG) BY MOUTH TWICE DAILY WITH MEALS  Dispense: 180 tablet; Refill: 0     If protocol passes may refill for 12 months if within 3 months of last provider visit (or a total of 15 months).          Passed - A1C in last 6 months    Hemoglobin A1c   Date Value Ref Range Status   10/17/2018 6.0 3.5 - 6.0 % Final

## 2021-06-23 NOTE — TELEPHONE ENCOUNTER
Refill Approved    Rx renewed per Medication Renewal Policy. Medication was last renewed on 2/19/18.    Mariam Ambrocio, [facility] Triage/Med Refill 1/19/2019     Requested Prescriptions   Pending Prescriptions Disp Refills     ranitidine (ZANTAC) 150 MG tablet [Pharmacy Med Name: RANITIDINE 150MG TABLETS] 180 tablet 0     Sig: TAKE 1 TABLET(150 MG) BY MOUTH TWICE DAILY    GI Medications Refill Protocol Passed - 1/19/2019  3:50 AM       Passed - PCP or prescribing provider visit in last 12 or next 3 months.    Last office visit with prescriber/PCP: 1/16/2019 Cammy Bui MD OR same dept: 1/16/2019 Cammy Bui MD OR same specialty: 1/16/2019 Cammy Bui MD  Last physical: 10/17/2018 Last MTM visit: Visit date not found   Next visit within 3 mo: Visit date not found  Next physical within 3 mo: Visit date not found  Prescriber OR PCP: Cammy Bui MD  Last diagnosis associated with med order: 1. GERD (gastroesophageal reflux disease)  - ranitidine (ZANTAC) 150 MG tablet [Pharmacy Med Name: RANITIDINE 150MG TABLETS]; TAKE 1 TABLET(150 MG) BY MOUTH TWICE DAILY  Dispense: 180 tablet; Refill: 0    If protocol passes may refill for 12 months if within 3 months of last provider visit (or a total of 15 months).

## 2021-06-23 NOTE — TELEPHONE ENCOUNTER
ANTICOAGULATION  MANAGEMENT    Assessment     Today's INR result of 2.3 is Therapeutic (goal INR of 2.0-3.0)        Warfarin taken as previously instructed    No new diet changes affecting INR    No new medication/supplements affecting INR    Continues to tolerate warfarin with no reported s/s of bleeding or thromboembolism     Previous INR was Subtherapeutic    Plan:     Spoke with Mary Kay regarding INR result and instructed:     Warfarin Dosing Instructions:  Continue current warfarin dose 2.5 mg daily on Mondays; and 5 mg daily rest of week  (0 % change)    Instructed patient to follow up no later than: one month.    Education provided: importance of therapeutic range, importance of following up for INR monitoring at instructed interval and importance of taking warfarin as instructed    Mary Kay verbalizes understanding and agrees to warfarin dosing plan.    Instructed to call the [facility] for any changes, questions or concerns. (#[phone])   ?   Mena Dumont RN    Subjective/Objective:      Mary Kaydilma Tejada, a 69 y.o. female is on warfarin.     Mary Kay reports:     Home warfarin dose: verbally confirmed home dose with Mary Kay and updated on anticoagulation calendar     Missed doses: No     Medication changes:  No     S/S of bleeding or thromboembolism:  No     New Injury or illness:  No     Changes in diet or alcohol consumption:  No     Upcoming surgery, procedure or cardioversion:  No    Anticoagulation Episode Summary     Current INR goal:   2.0-3.0   TTR:   54.7 % (2 y)   Next INR check:   2/13/2019   INR from last check:   2.30 (1/16/2019)   Weekly max warfarin dose:      Target end date:   Indefinite   INR check location:      Preferred lab:      Send INR reminders to:   ANTICOAGULATION POOL A (WBY,WBE,MID,RSC)    Indications    Paroxysmal Atrial Fibrillation [I48.91]           Comments:            Anticoagulation Care Providers     Provider Role Specialty Phone number     Cammy Bui MD [facility] Medicine [phone]

## 2021-06-24 NOTE — TELEPHONE ENCOUNTER
Medication Request  Medication name:  Generic Lancets  Pharmacy Name and Location:  [employer]  Reason for request:  Patient is out of the lancets and needs a specific lancet.  Or   Patient has been using the same meter and lancets for years could get a new kit  meter lancets and test strips   When did you use medication last?:  n/a  Okay to leave a detailed message: yes

## 2021-06-24 NOTE — TELEPHONE ENCOUNTER
RN cannot approve Refill Request    RN can NOT refill this medication med is not covered by policy/route to provider and historical medication requested.       Daksha Siu, [facility] Triage/Med Refill 2/18/2019    Requested Prescriptions   Pending Prescriptions Disp Refills     gabapentin (NEURONTIN) 100 MG capsule [Pharmacy Med Name: GABAPENTIN 100MG CAPSULES] 90 capsule 0     Sig: TAKE 1-3 CAPSULES BY MOUTH AT BEDTIME    Gabapentin/Levetiracetam/Tiagabine Refill Protocol  Passed - 2/14/2019  4:19 PM       Passed - PCP or prescribing provider visit in past 12 months or next 3 months    Last office visit with prescriber/PCP: 1/16/2019 Cammy Bui MD OR same dept: 1/16/2019 Cammy Bui MD OR same specialty: 1/16/2019 Cammy Bui MD  Last physical: 10/17/2018 Last MTM visit: Visit date not found   Next visit within 3 mo: 2/18/2019 Cammy Bui MD  Next physical within 3 mo: Visit date not found  Prescriber OR PCP: Cammy Bui MD  Last diagnosis associated with med order: 1. DM neuropathy, type II diabetes mellitus (H)  - gabapentin (NEURONTIN) 100 MG capsule [Pharmacy Med Name: GABAPENTIN 100MG CAPSULES]; TAKE 1-3 CAPSULES BY MOUTH AT BEDTIME  Dispense: 90 capsule; Refill: 0    2. Paroxysmal atrial fibrillation (H)  - warfarin (COUMADIN/JANTOVEN) 5 MG tablet [Pharmacy Med Name: WARFARIN SOD 5MG TABLETS (PEACH)]; TAKE 1/2 TO 1 TABLET DAILY AS DIRECTED BY CLINIC  Dispense: 90 tablet; Refill: 0    If protocol passes may refill for 12 months if within 3 months of last provider visit (or a total of 15 months).             warfarin (COUMADIN/JANTOVEN) 5 MG tablet [Pharmacy Med Name: WARFARIN SOD 5MG TABLETS (PEACH)] 90 tablet 0     Sig: TAKE 1/2 TO 1 TABLET DAILY AS DIRECTED BY CLINIC    Warfarin Refill Protocol  Failed - 2/14/2019  4:19 PM       Failed -  Route to appropriate pool/provider    Last Anticoagulation Summary:    Anticoagulation Episode Summary     Current INR goal:   2.0-3.0   TTR:   54.7 % (2 y)   Next INR check:   2/13/2019   INR from last check:   2.30 (1/16/2019)   Weekly max warfarin dose:      Target end date:   Indefinite   INR check location:      Preferred lab:      Send INR reminders to:   ANTICOAGULATION POOL A (WBY,WBE,MID,RSC)    Indications    Paroxysmal Atrial Fibrillation [I48.91]           Comments:            Anticoagulation Care Providers     Provider Role Specialty Phone number    Cammy Bui MD Referring Family Medicine [phone]               Passed - Provider visit in last year    Last office visit with prescriber/PCP: 1/16/2019 Cammy Bui MD OR same dept: 1/16/2019 Cammy Bui MD OR same specialty: 1/16/2019 Cammy Bui MD  Last physical: 10/17/2018 Last MTM visit: Visit date not found    Next appt within 3 mo: 2/18/2019 Cammy Bui MD Next physical within 3 mo: Visit date not found  Prescriber OR PCP: Cammy Bui MD  Last diagnosis associated with med order: 1. DM neuropathy, type II diabetes mellitus (H)  - gabapentin (NEURONTIN) 100 MG capsule [Pharmacy Med Name: GABAPENTIN 100MG CAPSULES]; TAKE 1-3 CAPSULES BY MOUTH AT BEDTIME  Dispense: 90 capsule; Refill: 0    2. Paroxysmal atrial fibrillation (H)  - warfarin (COUMADIN/JANTOVEN) 5 MG tablet [Pharmacy Med Name: WARFARIN SOD 5MG TABLETS (PEACH)]; TAKE 1/2 TO 1 TABLET DAILY AS DIRECTED BY CLINIC  Dispense: 90 tablet; Refill: 0    If protocol passes may refill for 6 months if within 3 months of last provider visit (or a total of 9 months).

## 2021-06-24 NOTE — TELEPHONE ENCOUNTER
ANTICOAGULATION  MANAGEMENT    Assessment     Today's INR result of 2.3 is Therapeutic (goal INR of 2.0-3.0)        Warfarin taken as previously instructed    No new diet changes affecting INR    No new medication/supplements affecting INR    Continues to tolerate warfarin with no reported s/s of bleeding or thromboembolism     Previous INR was Therapeutic    Plan:     Spoke with Mary Kay regarding INR result and instructed:     Warfarin Dosing Instructions:  Continue current warfarin dose 2.5 mg daily on Mon; and 5 mg daily rest of week  (0 % change)    Instructed patient to follow up no later than: two weeks (patient did not want to hear the hold orders for colonoscopy today . Will give them to her on 3/4)    Education provided: importance of therapeutic range    Mary Kay verbalizes understanding and agrees to warfarin dosing plan.    Instructed to call the [facility] for any changes, questions or concerns. (#[phone])   ?   Danay Mtz RN    Subjective/Objective:      Mary Kay Tejada, a 69 y.o. female is on warfarin.     Mary Kay reports:     Home warfarin dose: as updated on anticoagulation calendar per template     Missed doses: No     Medication changes:  No     S/S of bleeding or thromboembolism:  No     New Injury or illness:  No     Changes in diet or alcohol consumption:  No     Upcoming surgery, procedure or cardioversion:  Yes: colonoscopy on 3/8    Anticoagulation Episode Summary     Current INR goal:   2.0-3.0   TTR:   56.6 % (2.1 y)   Next INR check:   3/4/2019   INR from last check:   2.30 (2/18/2019)   Weekly max warfarin dose:      Target end date:   Indefinite   INR check location:      Preferred lab:      Send INR reminders to:   ANTICOAGULATION POOL A (WBY,WBE,MID,RSC)    Indications    Paroxysmal Atrial Fibrillation [I48.91]           Comments:            Anticoagulation Care Providers     Provider Role Specialty Phone number    Cammy Bui MD Referring  Family Medicine [phone]

## 2021-06-24 NOTE — TELEPHONE ENCOUNTER
Medication Request  Medication name:  Amoxicillin and the prednisone  [facility] [address]  Reason for request:  Really bad cold  When did you use medication last?:  2/18/20  Okay to leave a detailed message: yes

## 2021-06-24 NOTE — PROGRESS NOTES
ASSESSMENT/PLAN:  1. Fibromyalgia  lidocaine 10 mg/mL (1 %) injection 10 mL   2. Iron deficiency anemia due to chronic blood loss  sucralfate (CARAFATE) 1 gram tablet   3. Type 2 diabetes mellitus with hyperglycemia (H)  blood glucose test (ONETOUCH ULTRA BLUE TEST STRIP) strips   4. Chronic pain syndrome  oxyCODONE (ROXICODONE) 10 mg immediate release tablet   5. Atrial fibrillation (H)  INR       This is a 70 yo female with:  1.  Fibromyalgia / chronic pain - has trigger point pain - has benefitted from trigger point injections in the past - this allows less chronic narcotics for pain.    2.  Iron deficiency anemia - with chronic blood loss - no clear bleeding episodes recently - refill Sucralfate  3.  Type II DM - needs refill on test strips -   4.  Chronic pain - uses oxycodone - has not shown any increasing use of this medication  5.  Atrial Fibrillation - chronic - uses warfarin therapy - due for INR today - followed by/managed by anticoagulation program  No Follow-up on file.  Administrations This Visit     lidocaine 10 mg/mL (1 %) injection 10 mL     Admin Date  02/18/2019 Action  Given Dose  10 mL Route  Other Administered By  Marcos Lovett CMA                Medications Discontinued During This Encounter   Medication Reason     gabapentin (NEURONTIN) 300 MG capsule      sucralfate (CARAFATE) 1 gram tablet Reorder     ONETOUCH ULTRA BLUE TEST STRIP strips Reorder     oxyCODONE (ROXICODONE) 10 mg immediate release tablet Reorder     amoxicillin (AMOXIL) 500 MG tablet Therapy completed     blood glucose test (ACCU-CHEK PEDRO PLUS TEST STRP) strips Formulary change     PRECISION Q-I-D TEST strips Formulary change     predniSONE (DELTASONE) 10 mg tablet Therapy completed     There are no Patient Instructions on file for this visit.    Chief Complaint:  Chief Complaint   Patient presents with     Injections     Trigger Point injection       HPI:   Mary Kay Tejada is a 69 y.o. female c/o  1.  Here for trigger  point pain - desires injection  2.  Needs INR  3.  Needs refill on Oxycodone  4.  No bleeding - feels pretty good - stools normal in color    PMH:   Patient Active Problem List    Diagnosis Date Noted     Chronic a-fib (H)      Dyslipidemia      Upper GI bleed 12/13/2018     Melena 12/13/2018     Anemia due to blood loss, acute 07/18/2018     Diabetic polyneuropathy associated with type 2 diabetes mellitus (H) 07/18/2018     Chronic anemia 06/27/2018     Cataract 11/06/2017     Bunion 07/19/2017     Callus of foot 07/19/2017     Nonrheumatic aortic valve stenosis 05/23/2017     COPD (chronic obstructive pulmonary disease) (H) 05/23/2017     DM neuropathy, type II diabetes mellitus (H) 05/19/2017     Chronic obstructive pulmonary disease with acute lower respiratory infection (H) 12/24/2016     Gastroenteritis 12/24/2016     Syncope 12/22/2016     Opacity of lung on imaging study 12/22/2016     Acute gastritis 12/22/2016     Osteoarthritis of both knees 08/22/2016     Osteoarthritis, hip, bilateral 06/20/2016     Primary osteoarthritis of left knee 06/20/2016     Candidal intertrigo 05/11/2016     Type 2 diabetes mellitus with hypoglycemia (H)      Aspiration pneumonia (H) 03/01/2016     Controlled substance agreement signed 11/23/2015     Cervical neck pain with evidence of disc disease 07/22/2015     Headache 07/17/2015     Hematoma of abdominal wall 07/05/2015     Skin lesion of face 05/22/2015     Tendonitis of wrist, right 04/22/2015     Menopause 04/06/2015     Flashers or floaters of right eye 02/23/2015     Tendonitis of wrist, left 01/21/2015     Trigger point of thoracic region 01/21/2015     Generalized osteoarthrosis, involving multiple sites 01/14/2015     Vertigo 12/17/2014     Rotator cuff tendonitis 12/17/2014     Abnormal Weight Loss      Osteoarthritis Of The Shoulder      Chronic Constipation      Onychomycosis Of The Toenails      Diarrhea      Ganglion Of The Left Wrist      Larynx Edema       Obesity      Malignant Vulvar Neoplasm      Medial Epicondylitis      Skin Lesion      Urinary Incontinence      Chronic Major Depression      Dry Eye Syndrome      Nicotine Dependence      Hypokalemia      Essential hypertension      Muscle Cramps In The Calf      Edema      Atrial flutter (H)      Lump In / On The Skin      Type 2 diabetes mellitus with hyperglycemia (H)      Chronic pain syndrome      Paroxysmal Atrial Fibrillation      Fibromyalgia      Nausea      Abdominal Pain      Peptic Ulcer      COPD exacerbation (H)      Mixed hyperlipidemia      Chronic Reflux Esophagitis      Benign Adenomatous Polyp Of The Large Intestine      Past Medical History:   Diagnosis Date     Anemia      Aortic stenosis      Atrial fibrillation (H)      Atrial flutter (H)      Benign neoplasm of adenomatous polyp     large intestine      Chronic constipation      Chronic pain syndrome      COPD (chronic obstructive pulmonary disease) (H)     Oxygen at night      Depression      Diabetes mellitus (H)      Dry eye syndrome      Fibromyalgia      Ganglion     left wrist     GERD (gastroesophageal reflux disease)      Hyperlipidemia      Hypertension      Hypokalemia      Larynx edema      Lung disease      Medial epicondylitis      Onychomycosis      Osteoarthritis      Otitis Externa     Created by Conversion      Peptic ulcer      Type 2 diabetes mellitus (H)      Vulvar malignant neoplasm (H)      Past Surgical History:   Procedure Laterality Date     BREAST BIOPSY Right      BREAST BIOPSY Right 01/28/2015     BREAST BIOPSY Right 1/28/2015    Procedure: RIGHT BREAST BIOPSY AFTER WIRE LOCALIZATION AT 0940;  Surgeon: Renée Soriano MD;  Location: [facility];  Service:      ESOPHAGOGASTRODUODENOSCOPY N/A 11/6/2018    Procedure: ESOPHAGOGASTRODUODENOSCOPY;  Surgeon: Lit Fernando MD;  Location: [facility];  Service:      HYSTERECTOMY       MI ABLATE HEART DYSRHYTHM FOCUS      Description: Catheter Ablation  Atrial Fibrillation;  Recorded: 2012;  Comments: 12 PVI with Dr. Gardiner and nilay to all 5 pulm veins and CTI fl ablation line as well.     AK BIOPSY OF BREAST, INCISIONAL      Description: Incisional Breast Biopsy;  Recorded: 2007;  Comments: benign     AK SUPRACERV ABD HYSTERECTOMY      Description: Supracervical Hysterectomy;  Proc Date: 1985;  Comments: some cervix left!; ovaries intact; done for bleeding     AK TOTAL ABDOM HYSTERECTOMY      Description: Total Abdominal Hysterectomy;  Recorded: 2013;     AK TOTAL KNEE ARTHROPLASTY      Description: Total Knee Arthroplasty;  Recorded: 2007;     Social History     Socioeconomic History     Marital status:      Spouse name: Not on file     Number of children: Not on file     Years of education: Not on file     Highest education level: Not on file   Occupational History     Not on file   Social Needs     Financial resource strain: Not on file     Food insecurity:     Worry: Not on file     Inability: Not on file     Transportation needs:     Medical: Not on file     Non-medical: Not on file   Tobacco Use     Smoking status: Light Tobacco Smoker     Types: Cigarettes     Last attempt to quit: 2014     Years since quittin.1     Smokeless tobacco: Never Used     Tobacco comment: E-cig some day   Substance and Sexual Activity     Alcohol use: Yes     Comment: very little     Drug use: No     Sexual activity: Not on file   Lifestyle     Physical activity:     Days per week: Not on file     Minutes per session: Not on file     Stress: Not on file   Relationships     Social connections:     Talks on phone: Not on file     Gets together: Not on file     Attends Confucianism service: Not on file     Active member of club or organization: Not on file     Attends meetings of clubs or organizations: Not on file     Relationship status: Not on file     Intimate partner violence:     Fear of current or ex partner: Not on file      "Emotionally abused: Not on file     Physically abused: Not on file     Forced sexual activity: Not on file   Other Topics Concern     Not on file   Social History Narrative     Not on file     Family History   Problem Relation Age of Onset     Heart failure Mother      Cancer Other         paternal HX-laryngeal      Alcoholism Sister      No Medical Problems Daughter      No Medical Problems Maternal Grandmother      No Medical Problems Maternal Grandfather      No Medical Problems Paternal Grandmother      No Medical Problems Paternal Grandfather      No Medical Problems Maternal Aunt      No Medical Problems Paternal Aunt      Alcoholism Sister      Alcoholism Brother      Alcohol abuse Father      Cancer Paternal Uncle         Gastric-Alcohol     Cancer Paternal Uncle         gastric-Alcohol     BRCA 1/2 Neg Hx      Breast cancer Neg Hx      Colon cancer Neg Hx      Endometrial cancer Neg Hx      Ovarian cancer Neg Hx        Meds:    Current Outpatient Medications:      ACCU-CHEK SOFTCLIX LANCETS lancets, TEST THREE TIMES DAILY, Disp: 300 each, Rfl: 3     albuterol (PROVENTIL) 2.5 mg /3 mL (0.083 %) nebulizer solution, Take 3 mL (2.5 mg total) by nebulization every 4 (four) hours as needed for wheezing or shortness of breath., Disp: 75 mL, Rfl: 12     atorvastatin (LIPITOR) 20 MG tablet, TAKE 1 TABLET(20 MG) BY MOUTH AT BEDTIME, Disp: 90 tablet, Rfl: 3     BD ULTRA-FINE SHORT PEN NEEDLE 31 gauge x 5/16\" Ndle, TEST FOUR TIMES DAILY WITH MEALS AND AT BEDTIME, Disp: 300 each, Rfl: 3     blood glucose test (ONETOUCH ULTRA BLUE TEST STRIP) strips, TEST THREE TIMES DAILY, Disp: 200 strip, Rfl: 11     budesonide-formoterol (SYMBICORT) 160-4.5 mcg/actuation inhaler, Inhale 2 puffs 2 (two) times a day., Disp: 1 Inhaler, Rfl: 2     diltiazem (CARTIA XT) 120 MG 24 hr capsule, Take 1 capsule (120 mg total) by mouth daily., Disp: 90 capsule, Rfl: 1     furosemide (LASIX) 20 MG tablet, Take 20 mg by mouth daily as needed., " Disp: , Rfl:      gabapentin (NEURONTIN) 300 MG capsule, Take 300 mg by mouth daily before breakfast., Disp: , Rfl:      insulin aspart U-100 (NOVOLOG) 100 unit/mL injection, Inject under the skin 3 (three) times a day before meals. Inject per sliding scale, Disp: , Rfl:      ipratropium-albuterol (DUO-NEB) 0.5-2.5 mg/3 mL nebulizer, INAHALE 1 VIAL IN NEBULIZER EVERY 4 HOURS AS NEEDED, Disp: 1440 mL, Rfl: 0     meclizine (ANTIVERT) 25 mg tablet, TAKE 1 TABLET(25 MG) BY MOUTH THREE TIMES DAILY AS NEEDED FOR DIZZINESS OR NAUSEA, Disp: 30 tablet, Rfl: 5     metFORMIN (GLUCOPHAGE) 500 MG tablet, TAKE 1 TABLET(500 MG) BY MOUTH TWICE DAILY WITH MEALS, Disp: 180 tablet, Rfl: 3     multivitamin (DAILY-FAYE) per tablet, Take 1 tablet by mouth daily., Disp: 90 tablet, Rfl: 3     NOVOLOG FLEXPEN U-100 INSULIN 100 unit/mL injection pen, INJECT 10 UNITS EVERY MEAL AND AT BEDTIME, Disp: 15 mL, Rfl: 0     nystatin (NYSTOP) powder, Apply 1 application topically 2 (two) times a day as needed. 2-3 times to affected area(s)., Disp: 15 g, Rfl: 3     oxyCODONE (ROXICODONE) 10 mg immediate release tablet, Take 1 tablet (10 mg total) by mouth every 6 (six) hours as needed., Disp: 120 tablet, Rfl: 0     polyethylene glycol (MIRALAX) 17 gram packet, Take 1 packet (17 g total) by mouth daily., Disp: , Rfl: 0     ranitidine (ZANTAC) 150 MG tablet, TAKE 1 TABLET(150 MG) BY MOUTH TWICE DAILY, Disp: 180 tablet, Rfl: 3     sucralfate (CARAFATE) 1 gram tablet, TAKE 1 TABLET BY MOUTH FOUR TIMES DAILY(CRUSH TABLET AND MIX IT WITH A LITTLE WATER, THEN SWALLOW), Disp: 120 tablet, Rfl: 12     VENTOLIN HFA 90 mcg/actuation inhaler, INHALE 1 TO 2 PUFFS BY MOUTH EVERY 4 TO 6 HOURS AS NEEDED, Disp: 18 g, Rfl: 5     warfarin (COUMADIN/JANTOVEN) 5 MG tablet, Take 2.5 to 5mg (1/2 or 1 tabs) by mouth daily as directed.  Adjust dose based on INR. Take 2.5 mg Monday and 5 mg the rest of the week, to be started in 2 day, hold if dark stool., Disp: , Rfl:       "gabapentin (NEURONTIN) 100 MG capsule, TAKE 1-3 CAPSULES BY MOUTH AT BEDTIME, Disp: 270 capsule, Rfl: 3     warfarin (COUMADIN/JANTOVEN) 5 MG tablet, TAKE 1/2 TO 1 TABLET DAILY AS DIRECTED BY CLINIC, Disp: 90 tablet, Rfl: 0    Allergies:  Allergies   Allergen Reactions     Celebrex [Celecoxib] Rash     patient had butterfly rash - \"lupus-like\"       Latex Rash       ROS:  Pertinent positives as noted in HPI; otherwise 12 point ROS negative.      Physical Exam:  EXAM:  /50 (Patient Site: Right Arm, Patient Position: Sitting, Cuff Size: Adult Regular)   Pulse 69   Resp 20   SpO2 90%    Gen:  NAD, appears well, well-hydrated  HEENT:  TMs nl, oropharynx benign, nasal mucosa nl, conjunctiva clear  Neck:  Supple, no adenopathy, no thyromegaly, no carotid bruits, no JVD  Lungs:  Clear to auscultation bilaterally  Cor:  irreg irreg, no murmur  Abd:  Soft, nontender, BS+, no masses, no guarding or rebound, no HSM  Extr:  Neg.  Back:  Trigger points identified bilateral posterior shoulders/suprascapular region -   Neuro:  No asymmetry  Skin:  Warm/dry        Results:  Results for orders placed or performed in visit on 02/18/19   INR   Result Value Ref Range    INR 2.30 (H) 0.90 - 1.10           Procedure Note - Trigger Point Injections    Consent obtained: verbal    Indication:  Fibromyalgia, chronic pain    5 trigger points identified.  Each trigger point swabbed x 3 with Betadine swab.  Each trigger point then injected with 2 ml of 1% Lidocaine (no epi).    Total volume injected:  10 ml    Complications:  None, patient tolerated procedure well.    Plan:  Discussed care with patient.  RTC for further injections in 30 days prn.      "

## 2021-06-24 NOTE — TELEPHONE ENCOUNTER
Preceptor Attestation:   Patient seen, evaluated and discussed with the resident. I have verified the content of the note, which accurately reflects my assessment of the patient and the plan of care.   Supervising Physician:  Aldo Sarmiento MD      Signed orders as set up

## 2021-06-24 NOTE — TELEPHONE ENCOUNTER
ANTICOAGULATION  MANAGEMENT PROGRAM    Mary Kay Tejada is overdue for INR check.  Reminder call made.    Spoke with Mary Kay and patient will have INR checked at upcoming office visit on 3/15 . Mary Kay was supposed to have a colonoscopy on 3/8 but states she is canceling it.    Danay Mtz RN

## 2021-06-25 NOTE — PROGRESS NOTES
ASSESSMENT/PLAN:  1. Fibromyalgia  lidocaine 10 mg/mL (1 %) injection 10 mL   2. Paroxysmal Atrial Fibrillation  INR   3. Chronic pain syndrome  oxyCODONE (ROXICODONE) 10 mg immediate release tablet       This is a 72 yo female with;  1.  Fibromyalgia - here for trigger point injections - pain is significant bilaterally in upper back  2.  Paroxysmal Atrial Fibrillation - on chronic anticoagulation - check iNR  3.  Chronic pain syndrome - takes oxycodone - no increased needs    Return in about 1 month (around 6/24/2021) for Recheck.      Medications Discontinued During This Encounter   Medication Reason     oxyCODONE (ROXICODONE) 10 mg immediate release tablet Reorder     There are no Patient Instructions on file for this visit.    Chief Complaint:  Chief Complaint   Patient presents with     Injections     Trigger Point       HPI:   Mary Kay Tejada is a 71 y.o. female c/o  1.  Here for monthly trigger point injections  2.  Had first of COVID vaccines  3.  Needs INR today      PMH:   Patient Active Problem List    Diagnosis Date Noted     Chronic gastric ulcer without hemorrhage and without perforation 10/19/2020     Advanced directives, counseling/discussion 08/17/2020     Primary osteoarthritis of both knees 01/17/2020     Mixed stress and urge urinary incontinence 08/16/2019     Skin lesion 07/17/2019     Dyslipidemia      Upper GI bleed 12/13/2018     Melena 12/13/2018     Anemia due to blood loss, acute 07/18/2018     Diabetic polyneuropathy associated with type 2 diabetes mellitus (H) 07/18/2018     Chronic anemia 06/27/2018     Cataract 11/06/2017     Bunion, left 07/19/2017     Callus of foot 07/19/2017     Nonrheumatic aortic valve stenosis 05/23/2017     COPD (chronic obstructive pulmonary disease) (H) 05/23/2017     Chronic obstructive pulmonary disease with acute lower respiratory infection (H) 12/24/2016     Gastroenteritis 12/24/2016     Syncope 12/22/2016     Opacity of lung on imaging study  12/22/2016     Acute gastritis 12/22/2016     Osteoarthritis of both knees 08/22/2016     Osteoarthritis, hip, bilateral 06/20/2016     Primary osteoarthritis of left knee 06/20/2016     Candidal intertrigo 05/11/2016     Type 2 diabetes mellitus with hypoglycemia (H)      Aspiration pneumonia (H) 03/01/2016     Controlled substance agreement signed 11/23/2015     Cervical neck pain with evidence of disc disease 07/22/2015     Headache 07/17/2015     Hematoma of abdominal wall 07/05/2015     Skin lesion of face 05/22/2015     Tendonitis of wrist, right 04/22/2015     Menopause 04/06/2015     Flashers or floaters of right eye 02/23/2015     Tendonitis of wrist, left 01/21/2015     Trigger point of thoracic region 01/21/2015     Generalized osteoarthrosis, involving multiple sites 01/14/2015     Vertigo 12/17/2014     Rotator cuff tendonitis 12/17/2014     Abnormal Weight Loss      Osteoarthritis Of The Shoulder      Chronic Constipation      Onychomycosis Of The Toenails      Diarrhea      Ganglion Of The Left Wrist      Larynx Edema      Obesity      Medial Epicondylitis      Skin Lesion      Urinary Incontinence      Chronic Major Depression      Dry Eye Syndrome      Nicotine Dependence      Hypokalemia      Essential hypertension      Muscle Cramps In The Calf      Edema      Atrial flutter (H)      Lump In / On The Skin      Chronic pain syndrome      Paroxysmal Atrial Fibrillation      Fibromyalgia      Nausea      Abdominal Pain      Peptic Ulcer      COPD exacerbation (H)      Mixed hyperlipidemia      Chronic Reflux Esophagitis      Benign Adenomatous Polyp Of The Large Intestine      Past Medical History:   Diagnosis Date     Anemia      Aortic stenosis      Atrial fibrillation (H)      Atrial flutter (H)      Benign neoplasm of adenomatous polyp     large intestine      Chronic constipation      Chronic pain syndrome      COPD (chronic obstructive pulmonary disease) (H)     Oxygen at night      Dependence  on supplemental oxygen     Oxygen at noc, during the day as needed     Depression      Diabetes mellitus (H)      Dry eye syndrome      Fibromyalgia      Ganglion     left wrist     GERD (gastroesophageal reflux disease)      Hyperlipidemia      Hypertension      Hypokalemia      Larynx edema      Lung disease      Malignant Vulvar Neoplasm     Created by Conversion [facility] Annotation: Apr 17 2007  8:24AM - Cammy Bui:  resection per Dr. Alfonso Mane 9/06;  Replacement Utility updated for latest IMO load     Medial epicondylitis      Onychomycosis      Osteoarthritis      Otitis Externa     Created by Conversion      Peptic ulcer      Polyneuropathy      Vulvar malignant neoplasm (H)      Past Surgical History:   Procedure Laterality Date     BREAST BIOPSY Right      BREAST BIOPSY Right 01/28/2015     BREAST BIOPSY Right 1/28/2015    Procedure: RIGHT BREAST BIOPSY AFTER WIRE LOCALIZATION AT 0940;  Surgeon: Renée Soriano MD;  Location: [facility] [place];  Service:      COLONOSCOPY N/A 6/14/2019    Procedure: COLONOSCOPY;  Surgeon: Eduardo Mora MD;  Location: [facility] [place];  Service: Gastroenterology     ESOPHAGOGASTRODUODENOSCOPY N/A 11/6/2018    Procedure: ESOPHAGOGASTRODUODENOSCOPY;  Surgeon: Lit Fernando MD;  Location: [facility] [place];  Service:      HYSTERECTOMY       JOINT REPLACEMENT Left     TKA     VA ABLATE HEART DYSRHYTHM FOCUS      Description: Catheter Ablation Atrial Fibrillation;  Recorded: 07/31/2012;  Comments: 7/24/12 PVI with Dr. Gardiner and nilay to all 5 pulm veins and CTI fl ablation line as well.     VA BIOPSY OF BREAST, INCISIONAL      Description: Incisional Breast Biopsy;  Recorded: 11/13/2007;  Comments: benign     VA SUPRACERV ABD HYSTERECTOMY      Description: Supracervical Hysterectomy;  Proc Date: 01/01/1985;  Comments: some cervix left!; ovaries intact; done for bleeding     Social History     Socioeconomic History     Marital status:       Spouse name: Not on file     Number of children: Not on file     Years of education: Not on file     Highest education level: Not on file   Occupational History     Not on file   Social Needs     Financial resource strain: Not on file     Food insecurity     Worry: Not on file     Inability: Not on file     Transportation needs     Medical: Not on file     Non-medical: Not on file   Tobacco Use     Smoking status: Current Every Day Smoker     Packs/day: 0.50     Types: Cigarettes     Smokeless tobacco: Never Used   Substance and Sexual Activity     Alcohol use: Yes     Comment: very little     Drug use: No     Sexual activity: Not on file   Lifestyle     Physical activity     Days per week: Not on file     Minutes per session: Not on file     Stress: Not on file   Relationships     Social connections     Talks on phone: Not on file     Gets together: Not on file     Attends Buddhism service: Not on file     Active member of club or organization: Not on file     Attends meetings of clubs or organizations: Not on file     Relationship status: Not on file     Intimate partner violence     Fear of current or ex partner: Not on file     Emotionally abused: Not on file     Physically abused: Not on file     Forced sexual activity: Not on file   Other Topics Concern     Not on file   Social History Narrative     Not on file     Family History   Problem Relation Age of Onset     Heart failure Mother      Cancer Other         paternal HX-laryngeal      Alcoholism Sister      No Medical Problems Daughter      No Medical Problems Maternal Grandmother      No Medical Problems Maternal Grandfather      No Medical Problems Paternal Grandmother      No Medical Problems Paternal Grandfather      No Medical Problems Maternal Aunt      No Medical Problems Paternal Aunt      Alcoholism Sister      Alcoholism Brother      Alcohol abuse Father      Cancer Paternal Uncle         Gastric-Alcohol     Cancer Paternal Uncle          "gastric-Alcohol     BRCA 1/2 Neg Hx      Breast cancer Neg Hx      Colon cancer Neg Hx      Endometrial cancer Neg Hx      Ovarian cancer Neg Hx        Meds:    Current Outpatient Medications:      ACCU-CHEK SOFTCLIX LANCETS lancets, TEST THREE TIMES DAILY, Disp: 300 each, Rfl: 3     atorvastatin (LIPITOR) 20 MG tablet, TAKE 1 TABLET(20 MG) BY MOUTH AT BEDTIME, Disp: 90 tablet, Rfl: 3     BD ULTRA-FINE SHORT PEN NEEDLE 31 gauge x 5/16\" Ndle, TEST FOUR TIMES DAILY WITH MEALS AND AT BEDTIME, Disp: 400 each, Rfl: 3     blood-glucose meter (ONETOUCH VERIO IQ METER) Misc, Check blood sugar three times a day., Disp: 1 each, Rfl: 0     budesonide-formoteroL (SYMBICORT) 160-4.5 mcg/actuation inhaler, Inhale 2 puffs 2 (two) times a day. Inhale 2 puff by mouth twice daily, Disp: 1 Inhaler, Rfl: 2     cyclobenzaprine (FLEXERIL) 10 MG tablet, TAKE 1 TABLET(10 MG) BY MOUTH AT BEDTIME AS NEEDED FOR MUSCLE SPASMS, Disp: 30 tablet, Rfl: 0     diaper,brief,adult,disposable (ADULT BRIEFS - LARGE) Misc, Use 3-4 daily as needed for incontinence, Disp: 120 each, Rfl: 6     diltiazem (CARDIZEM CD) 120 MG 24 hr capsule, Take 1 capsule (120 mg total) by mouth daily., Disp: 90 capsule, Rfl: 1     furosemide (LASIX) 20 MG tablet, TAKE 1 TABLET(20 MG) BY MOUTH DAILY AS NEEDED, Disp: 90 tablet, Rfl: 3     gabapentin (NEURONTIN) 600 MG tablet, TAKE 1 TABLET(600 MG) BY MOUTH THREE TIMES DAILY, Disp: 270 tablet, Rfl: 3     generic lancets (FINGERSTIX LANCETS), Dispense brand per patient's insurance at pharmacy discretion., Disp: 300 each, Rfl: 0     ipratropium-albuterol (DUO-NEB) 0.5-2.5 mg/3 mL nebulizer, INAHALE 1 VIAL IN NEBULIZER EVERY 4 HOURS AS NEEDED, Disp: 1440 mL, Rfl: 0     meclizine (ANTIVERT) 25 mg tablet, Take 1 tablet (25 mg total) by mouth 3 (three) times a day as needed., Disp: 45 tablet, Rfl: 5     metFORMIN (GLUCOPHAGE) 500 MG tablet, TAKE 1 TABLET(500 MG) BY MOUTH TWICE DAILY WITH MEALS, Disp: 180 tablet, Rfl: 3     " mometasone-formoterol (DULERA) 200-5 mcg/actuation HFAA inhaler, Inhale 2 puffs 2 (two) times a day., Disp: 1 Inhaler, Rfl: 5     nicotine (NICODERM CQ) 21 mg/24 hr, Place 1 patch on the skin daily., Disp: 30 patch, Rfl: 1     nystatin (NYSTOP) powder, Apply 1 application topically 2 (two) times a day as needed. 2-3 times to affected area(s)., Disp: 60 g, Rfl: 3     omeprazole (PRILOSEC) 20 MG capsule, TAKE 1 CAPSULE(20 MG) BY MOUTH DAILY BEFORE BREAKFAST, Disp: 30 capsule, Rfl: 3     ONETOUCH VERIO TEST STRIPS strips, USE 1 EACH AS DIRECTED THREE TIMES DAILY AS NEEDED, Disp: 300 strip, Rfl: 3     oxyCODONE (ROXICODONE) 10 mg immediate release tablet, Take 1 tablet (10 mg total) by mouth every 6 (six) hours as needed., Disp: 120 tablet, Rfl: 0     polyethylene glycol (MIRALAX) 17 gram packet, Take 1 packet (17 g total) by mouth daily. (Patient taking differently: Take 17 g by mouth daily as needed.    ), Disp: , Rfl: 0     sucralfate (CARAFATE) 1 gram tablet, TAKE 1 TABLET BY MOUTH FOUR TIMES DAILY(CRUSH TABLET AND MIX IT WITH A LITTLE WATER, THEN SWALLOW), Disp: 120 tablet, Rfl: 12     warfarin ANTICOAGULANT (COUMADIN/JANTOVEN) 5 MG tablet, Take 1 tablet (5 mg) daily as directed.  Adjust dose per INR results., Disp: 90 tablet, Rfl: 1     albuterol (PROAIR HFA;PROVENTIL HFA;VENTOLIN HFA) 90 mcg/actuation inhaler, INHALE 2 PUFFS EVERY 4 HOURS AS NEEDED WHEEZING, Disp: 90 g, Rfl: 11     albuterol (PROVENTIL) 2.5 mg /3 mL (0.083 %) nebulizer solution, Take 3 mL (2.5 mg total) by nebulization every 4 (four) hours as needed for wheezing or shortness of breath., Disp: 75 mL, Rfl: 12     famotidine (PEPCID) 20 MG tablet, Take 1 tablet (20 mg total) by mouth 2 (two) times a day., Disp: 180 tablet, Rfl: 3    Current Facility-Administered Medications:      lidocaine 10 mg/mL (1 %) injection 10 mL, 10 mL, Other, Once, Cammy Bui MD     lidocaine 10 mg/mL (1 %) injection 10 mL, 10 mL, Other, Once,  "Cammy Bui MD     lidocaine 10 mg/mL (1 %) injection 10 mL, 10 mL, Intra-articular, Once, Cammy Bui MD    Allergies:  Allergies   Allergen Reactions     Celebrex [Celecoxib] Rash     patient had butterfly rash - \"lupus-like\"       Latex Rash       ROS:  Pertinent positives as noted in HPI; otherwise 12 point ROS negative.      Physical Exam:  EXAM:  /71 (Patient Site: Right Arm, Patient Position: Sitting, Cuff Size: Adult Small)   Pulse 75   Temp (!) 96.3  F (35.7  C) (Temporal)   Resp 12   Wt 154 lb (69.9 kg)   BMI 26.43 kg/m     Gen:  NAD, appears well, well-hydrated  HEENT:  TMs nl, oropharynx benign, nasal mucosa nl, conjunctiva clear  Neck:  Supple, no adenopathy, no thyromegaly, no carotid bruits, no JVD  Lungs:  Clear to auscultation bilaterally  Cor:  RRR no murmur  Abd:  Soft, nontender, BS+, no masses, no guarding or rebound, no HSM  Back:  Tender to palpation at paraspinal muscles and upper back muscles  Extr:  Neg.  Neuro:  No asymmetry  Skin:  Warm/dry        Results:  Results for orders placed or performed in visit on 05/24/21   INR   Result Value Ref Range    INR 3.30 (H) 0.90 - 1.10       Procedure Note - Trigger Point Injections    Consent obtained:verbal    Indication:  Relief of fibromyalgia trigger point pain    6 trigger points identified.  Each trigger point swabbed x 3 with Betadine swab.  Each trigger point then injected with 1.6 ml of 1% Lidocaine (no epi).    Total volume injected:  10 ml    Complications:  None, patient tolerated procedure well.    Plan:  Discussed care with patient.  RTC for further injections in 30 days prn.        "

## 2021-06-25 NOTE — TELEPHONE ENCOUNTER
Refill Approved    Rx renewed per Medication Renewal Policy. Medication was last renewed on 4/30/20.    Quincy Dang, [facility] Connection Triage/Med Refill 6/15/2021     Requested Prescriptions   Pending Prescriptions Disp Refills     budesonide-formoteroL (SYMBICORT) 160-4.5 mcg/actuation inhaler 1 Inhaler 2     Sig: Inhale 2 puffs 2 (two) times a day. Inhale 2 puff by mouth twice daily       Asthma Medications Refill Protocol Passed - 6/14/2021  8:57 AM        Passed - PCP or prescribing provider visit in last year     Last office visit with prescriber/PCP: 5/24/2021 Cammy Bui MD OR same dept: 5/24/2021 Cammy Bui MD OR same specialty: 5/24/2021 Cammy Bui MD  Last physical: 8/17/2020 Last MTM visit: Visit date not found    Next appt within 3 mo: Visit date not found Next physical within 3 mo: Visit date not found  Prescriber OR PCP: Cammy Bui MD  Last diagnosis associated with med order: 1. COPD exacerbation (H)  - budesonide-formoteroL (SYMBICORT) 160-4.5 mcg/actuation inhaler; Inhale 2 puffs 2 (two) times a day. Inhale 2 puff by mouth twice daily  Dispense: 1 Inhaler; Refill: 2    If protocol passes may refill for 6 months if within 3 months of last provider visit (or a total of 9 months).

## 2021-06-25 NOTE — TELEPHONE ENCOUNTER
Refill Approved    Rx renewed per Medication Renewal Policy. Medication was last renewed on 3/15/19 -resent as 90 day supply     Kali Ruiz, [facility] Connection Triage/Med Refill 3/18/2019     Requested Prescriptions   Pending Prescriptions Disp Refills     albuterol (PROAIR HFA;PROVENTIL HFA;VENTOLIN HFA) 90 mcg/actuation inhaler [Pharmacy Med Name: ALBUTEROL HFA INH (200 PUFFS) 18GM] 90 g 11     Sig: INHALE 2 PUFFS EVERY 4 HOURS AS NEEDED WHEEZING    Albuterol/Levalbuterol Refill Protocol Passed - 3/15/2019 11:31 AM       Passed - PCP or prescribing provider visit in last year    Last office visit with prescriber/PCP: 3/15/2019 Cammy Bui MD OR same dept: 3/15/2019 Cammy Bui MD OR same specialty: 3/15/2019 Cammy Bui MD Last physical: 10/17/2018       Next appt within 3 mo: Visit date not found  Next physical within 3 mo: Visit date not found  Prescriber OR PCP: Cammy Bui MD  Last diagnosis associated with med order: 1. Chronic obstructive pulmonary disease with acute lower respiratory infection (H)  - albuterol (PROAIR HFA;PROVENTIL HFA;VENTOLIN HFA) 90 mcg/actuation inhaler [Pharmacy Med Name: ALBUTEROL HFA INH (200 PUFFS) 18GM]; INHALE 2 PUFFS EVERY 4 HOURS AS NEEDED WHEEZING  Dispense: 90 g; Refill: 11    If protocol passes may refill for 6 months if within 3 months of last provider visit (or a total of 9 months). If patient requesting >1 inhaler per month refill x 6 months and have patient make appointment with provider.

## 2021-06-25 NOTE — PROGRESS NOTES
[facility] Care Coordination    Mailed HCD forms to member.     Prashant Vargas  Care Management Specialist  [facility]  [phone]

## 2021-06-25 NOTE — PROGRESS NOTES
[facility] Care Coordination      [facility] Six-Month Telephone Assessment    6 month telephone assessment completed on 5/28/21.    ER visits: No  Hospitalizations: No  TCU stays: No  Significant health status changes: No  Falls/Injuries: No  ADL/IADL changes: No  Changes in services: No.   Member was contacted by [facility] [email or web address] for the set up.     Caregiver Assessment follow up:  n/a    Goals: See POC in chart for goal progress documentation.      Will see member in 6 months for an annual health risk assessment.   Encouraged member to call CC with any questions or concerns in the meantime.     Samantha Alexandra RN, PHN   [facility]  [phone]

## 2021-06-25 NOTE — TELEPHONE ENCOUNTER
Who is calling:  Patient  Reason for Call:  Patient is leaving for vacation this week. Patient is asking for her refill to be sent by Thursday.  Date of last appointment with primary care: 3/15/19  Okay to leave a detailed message: Yes

## 2021-06-25 NOTE — TELEPHONE ENCOUNTER
ANTICOAGULATION  MANAGEMENT    Assessment     Today's INR result of 3.2 is Supratherapeutic (goal INR of 2.0-3.0)        Warfarin taken as previously instructed    No new diet changes affecting INR    Interaction between Amoxicillin and Prednisone (ends today) and warfarin may be affecting INR    Continues to tolerate warfarin with no reported s/s of bleeding or thromboembolism     Previous INR was Therapeutic    Plan:     Spoke with Mary Kay regarding INR result and instructed:     Warfarin Dosing Instructions:  Continue current warfarin dose 2.5 mg daily on Mon; and 5 mg daily rest of week  (0 % change)    Instructed patient to follow up no later than: two weeks    Education provided: potential interaction between warfarin and Amoxicillin and Prednisone    Mary Kay verbalizes understanding and agrees to warfarin dosing plan.    Instructed to call the [facility] for any changes, questions or concerns. (#[phone])   ?   Danay Mtz RN    Subjective/Objective:      Mary Kay Tejada, a 69 y.o. female is on warfarin.     Mary Kay reports:     Home warfarin dose: as updated on anticoagulation calendar per template     Missed doses: No     Medication changes:  Yes: Amoxicillin and Prednisone ending today     S/S of bleeding or thromboembolism:  No     New Injury or illness:  No     Changes in diet or alcohol consumption:  No     Upcoming surgery, procedure or cardioversion:  No    Anticoagulation Episode Summary     Current INR goal:   2.0-3.0   TTR:   57.3 % (2.1 y)   Next INR check:   3/29/2019   INR from last check:   3.20! (3/15/2019)   Weekly max warfarin dose:      Target end date:   Indefinite   INR check location:      Preferred lab:      Send INR reminders to:   ANTICOAGULATION POOL A (WBY,WBE,MID,RSC)    Indications    Paroxysmal Atrial Fibrillation [I48.91]           Comments:            Anticoagulation Care Providers     Provider Role Specialty Phone number    Cammy Bui MD  [facility] [phone]

## 2021-06-26 ENCOUNTER — HEALTH MAINTENANCE LETTER (OUTPATIENT)
Age: 71
End: 2021-06-26

## 2021-06-26 NOTE — TELEPHONE ENCOUNTER
ANTICOAGULATION  MANAGEMENT PROGRAM    Mary Kay Tejada is overdue for INR check.     Spoke with Mary Kay and patient will have INR checked at next office visit on 6/23.       Marija Crawley RN

## 2021-06-29 NOTE — PROGRESS NOTES
Progress Notes by Marija Crawley, RN at 10/16/2020  3:10 PM     Author: Marija Crawley RN Service: -- Author Type: Registered Nurse    Filed: 10/16/2020  5:34 PM Encounter Date: 10/16/2020 Status: Signed    : Marija Crawley RN (Registered Nurse)       Cammy Bui MD Koutsouvas, Paula B, RN             Provider Review: Anticoagulation Annual Referral Renewal     ACM Renewal Decision:  Renew ACM warfarin management     INR Range:   Continue management at current INR goal   Anticipated Duration of Therapy (from today):  Long-term anticoagulation       Cammy Bui MD   5:26 PM

## 2021-06-30 NOTE — PROGRESS NOTES
"Progress Notes by Surendra Pizano MD (Ted) at 2/19/2021  2:30 PM     Author: Surendra Pizano MD (Ted) Service: -- Author Type: Physician    Filed: 2/19/2021  2:36 PM Encounter Date: 2/19/2021 Status: Signed    : Surendra Pizano MD (Ted) (Physician)           Cardiology Progress Note    Assessment:  Aortic stenosis, moderate associated with mild to moderate aortic regurgitation, asymptomatic  Paroxysmal atrial fibrillation status post PVI in 2012, no recurrent documented episodes  Paroxysmal atrial flutter status post remote ablation, no new episodes  Diabetes mellitus  COPD, active tobacco user  Fibromyalgia    Plan:  Echo and exam are consistent with moderate aortic valve disease.  We do not need to consider intervention at this point.  We will continue to monitor valve with physical exam and echo.  Next echo in a year.    I encouraged her to quit tobacco products.    I encouraged her to contact me should he have any new chest symptoms.    Follow-up in 1 year    Subjective:   This is 71 y.o. female who comes in today for follow-up visit.  She denies exertional chest pain or worsening shortness of breath.  She has not had syncope.  She has had brief episodes of irregular heart rhythm.  She has never had any prolonged racing.  She continues to smoke cigarettes    Review of Systems:   General: WNL  Eyes: WNL  Ears/Nose/Throat: WNL  Lungs: Cough, Shortness of Breath  Heart: Irregular Heartbeat  Stomach: WNL  Bladder: WNL  Muscle/Joints: Joint Pain, Muscle Weakness, Muscle Pain  Skin: WNL  Nervous System: Dizziness  Mental Health: WNL     Blood: WNL    Objective:   /52 (Patient Site: Right Arm, Patient Position: Sitting, Cuff Size: Adult Regular)   Pulse 76   Resp 16   Ht 5' 3.5\" (1.613 m)   Wt 152 lb 6.4 oz (69.1 kg)   BMI 26.57 kg/m    Physical Exam:  GENERAL: no distress  NECK: No JVD  LUNGS: Decreased breath sounds  CARDIAC: regular rhythm, S1 & S2 normal.  No " "heaves, thrills, gallops.  2/6 systolic ejection murmur at the aortic area  ABDOMEN: flat, negative hepatosplenomegaly, soft and non-tender.  EXTREMITIES: No evidence of cyanosis, clubbing or edema.    Current Outpatient Medications   Medication Sig Dispense Refill   ? ACCU-CHEK SOFTCLIX LANCETS lancets TEST THREE TIMES DAILY 300 each 3   ? albuterol (PROAIR HFA;PROVENTIL HFA;VENTOLIN HFA) 90 mcg/actuation inhaler INHALE 2 PUFFS EVERY 4 HOURS AS NEEDED WHEEZING 90 g 11   ? albuterol (PROVENTIL) 2.5 mg /3 mL (0.083 %) nebulizer solution Take 3 mL (2.5 mg total) by nebulization every 4 (four) hours as needed for wheezing or shortness of breath. 75 mL 12   ? atorvastatin (LIPITOR) 20 MG tablet TAKE 1 TABLET(20 MG) BY MOUTH AT BEDTIME 90 tablet 3   ? BD ULTRA-FINE SHORT PEN NEEDLE 31 gauge x 5/16\" Ndle TEST FOUR TIMES DAILY WITH MEALS AND AT BEDTIME 400 each 3   ? blood-glucose meter (ONETOUCH VERIO IQ METER) Misc Check blood sugar three times a day. 1 each 0   ? budesonide-formoteroL (SYMBICORT) 160-4.5 mcg/actuation inhaler Inhale 2 puffs 2 (two) times a day. Inhale 2 puff by mouth twice daily 1 Inhaler 2   ? cyclobenzaprine (FLEXERIL) 10 MG tablet TAKE 1 TABLET(10 MG) BY MOUTH AT BEDTIME AS NEEDED FOR MUSCLE SPASMS 30 tablet 0   ? diaper,brief,adult,disposable (ADULT BRIEFS - LARGE) Misc Use 3-4 daily as needed for incontinence 120 each 6   ? diltiazem (CARDIZEM CD) 120 MG 24 hr capsule TAKE ONE CAPSULE BY MOUTH DAILY 90 capsule 1   ? famotidine (PEPCID) 20 MG tablet Take 1 tablet (20 mg total) by mouth 2 (two) times a day. 180 tablet 3   ? furosemide (LASIX) 20 MG tablet TAKE 1 TABLET(20 MG) BY MOUTH DAILY AS NEEDED 90 tablet 3   ? gabapentin (NEURONTIN) 600 MG tablet TAKE 1 TABLET(600 MG) BY MOUTH THREE TIMES DAILY 270 tablet 3   ? generic lancets (FINGERSTIX LANCETS) Dispense brand per patient's insurance at pharmacy discretion. 300 each 0   ? ipratropium-albuterol (DUO-NEB) 0.5-2.5 mg/3 mL nebulizer INASHAIE 1 " VIAL IN NEBULIZER EVERY 4 HOURS AS NEEDED 1440 mL 0   ? meclizine (ANTIVERT) 25 mg tablet Take 1 tablet (25 mg total) by mouth 3 (three) times a day as needed. 45 tablet 5   ? metFORMIN (GLUCOPHAGE) 500 MG tablet TAKE 1 TABLET(500 MG) BY MOUTH TWICE DAILY WITH MEALS 180 tablet 3   ? mometasone-formoterol (DULERA) 200-5 mcg/actuation HFAA inhaler Inhale 2 puffs 2 (two) times a day. 1 Inhaler 5   ? nystatin (NYSTOP) powder Apply 1 application topically 2 (two) times a day as needed. 2-3 times to affected area(s). 60 g 3   ? omeprazole (PRILOSEC) 20 MG capsule TAKE 1 CAPSULE(20 MG) BY MOUTH DAILY BEFORE BREAKFAST 30 capsule 3   ? ONETOUCH VERIO TEST STRIPS strips USE 1 EACH AS DIRECTED THREE TIMES DAILY AS NEEDED 300 strip 3   ? oxyCODONE (ROXICODONE) 10 mg immediate release tablet Take 1 tablet (10 mg total) by mouth every 6 (six) hours as needed. 120 tablet 0   ? polyethylene glycol (MIRALAX) 17 gram packet Take 1 packet (17 g total) by mouth daily. (Patient taking differently: Take 17 g by mouth daily as needed.       )  0   ? sucralfate (CARAFATE) 1 gram tablet TAKE 1 TABLET BY MOUTH FOUR TIMES DAILY(CRUSH TABLET AND MIX IT WITH A LITTLE WATER, THEN SWALLOW) 120 tablet 12   ? warfarin ANTICOAGULANT (COUMADIN/JANTOVEN) 5 MG tablet TAKE 1/2 TO 1 TABLET BY MOUTH DAILY AS DIRECTED 90 tablet 1   ? nicotine (NICODERM CQ) 21 mg/24 hr Place 1 patch on the skin daily. 30 patch 1     No current facility-administered medications for this visit.        Cardiographics:      Echocardiogram: February 2021  1. Normal left ventricular size and systolic performance with a visually estimated ejection fraction of 60-65%.   2. There is moderate aortic stenosis.   3. There is mild to moderate aortic insufficiency.   4. Normal right ventricular size and systolic performance.      When compared to the prior real-time echocardiogram dated 17 August 2018, the degree of aortic insufficiency has mildly further increased.  The degree of aortic  stenosis appears fairly similar.    Stress Test: 2010  Normal perfusion scan    Lab Results:       Lab Results   Component Value Date    CHOL 174 11/11/2020    CHOL 163 06/22/2020    CHOL 179 01/21/2020     Lab Results   Component Value Date    HDL 80 11/11/2020    HDL 78 06/22/2020    HDL 73 01/21/2020     Lab Results   Component Value Date    LDLCALC 81 11/11/2020    LDLCALC 74 06/22/2020    LDLCALC 83 01/21/2020     Lab Results   Component Value Date    TRIG 67 11/11/2020    TRIG 56 06/22/2020    TRIG 117 01/21/2020     BNP   Date Value Ref Range Status   03/01/2016 30 0 - 109 pg/mL Final       Surendra (Bao)  MD [place]

## 2021-07-04 NOTE — TELEPHONE ENCOUNTER
Telephone Encounter by Saundra Arango, RN at 6/23/2021 11:00 AM     Author: Saundra Arango RN Service: -- Author Type: Registered Nurse    Filed: 6/23/2021 11:23 AM Encounter Date: 6/23/2021 Status: Signed    : Saundra Arango RN (Registered Nurse)       ANTICOAGULATION MANAGEMENT     Mary Kay Tejada 71 y.o., female is on warfarin with Supratherapeutic INR result (goal range 2.0-3.0)    Recent labs: (last 7 days)     06/23/21  0903   INR 3.80*       ASSESSMENT     Source: Patient/Caregiver Call and Template      Warfarin dosing taken: Warfarin taken as instructed    Diet: No new diet changes affecting INR    Illness, Injury or hospitalization: No    Medication changes: None    Signs or symptoms of bleeding or clotting: No    Previous INR: supratherapeutic    Additional findings: None     PLAN     Recommended plan for no diet, medication or health factor changes affecting INR:     Dosing instructions: hold today then Decrease to 2.5 mg daily on sat; and 5 mg daily rest of week (7.1% change)    Follow up no later than: 2 weeks     Telephone call with Mary Kay who agrees to plan and repeated back plan correctly    Patient offered & declined to schedule next visit    Education provided: importance of consistent vitamin K intake, target INR goal and significance of current INR result and importance of following up for INR monitoring at instructed interval    Plan made per [facility] anticoagulation protocol    Saundra Arango  Anticoagulation Clinic   [phone]    Anticoagulation Episode Summary     Current INR goal:  2.0-3.0   TTR:  46.9 % (1 y)   Next INR check:  7/7/2021   INR from last check:  3.80 (6/23/2021)   Weekly max warfarin dose:     Target end date:  Indefinite   INR check location:     Preferred lab:     Send INR reminders to:  [facility]    Indications    Paroxysmal Atrial Fibrillation [I48.91]           Comments:           Anticoagulation Care Providers     Provider Role Specialty Phone  number    Cammy Bui MD [facility] Family Medicine [phone]

## 2021-07-04 NOTE — LETTER
Letter by Samantha Alexandra RN at      Author: Samantha Alexandra RN Service: -- Author Type: --    Filed:  Encounter Date: 6/1/2021 Status: (Other)       Saira Mahmood Advance Care Planning       [address]  [address] [facility], [place] [address]      Dear Mary Kay:    You shared with me your interest in receiving information on Advance Care Planning and Health Care Directives. Discussing and making decisions about this part of our health is very important.  A Health Care Directive is a written document that outlines your goals, values, beliefs and choices for health care and medical treatment in the event you are unable to speak for yourself.     We greatly value the opportunity to assist you in documenting your choices and to honor your   wishes. Weve enclosed forms to help you get started thinking about your values and goals. We have several options for additional resources:       Health Care Directives and Advance Care Planning resources can be viewed and printed   for free at our web site:  [email or web address].       Free group classes on Advance Care Planning and completing a Health Care Directive are available at multiple locations and times. These classes are led by trained staff who will provide information and guide you through a Health Care Directive.  They can also review, notarize and add your Health Care Directive to your medical record. [place] for a class at [email or web address] or by calling [employer] at [phone] or toll free [phone].      COPIES of completed Health Care Directives can be brought or mailed to any of our   locations, including the address listed below. You can also email a copy to [email or web address] .      Email or call me at the contact information listed below for questions, assistance, or to   make an appointment to discuss creating a Health Care Directive. You can also contact   our [facility] [place] Department for questions or  assistance.       Sincerely,     Samantha Alexandra

## 2021-07-06 VITALS
BODY MASS INDEX: 26.43 KG/M2 | WEIGHT: 154 LBS | SYSTOLIC BLOOD PRESSURE: 121 MMHG | RESPIRATION RATE: 12 BRPM | HEART RATE: 75 BPM | TEMPERATURE: 96.3 F | DIASTOLIC BLOOD PRESSURE: 71 MMHG

## 2021-07-14 ENCOUNTER — DOCUMENTATION ONLY (OUTPATIENT)
Dept: FAMILY MEDICINE | Facility: CLINIC | Age: 71
End: 2021-07-14

## 2021-07-14 NOTE — PROGRESS NOTES
Patient is overdue for INR.   Updating check date to today so patient shows on our reminder list.      Patient was due for INR on 7/7/21    Noted: Patient has provider visit on 7/21 with INR noted in appt.    Overdue INR reminder updated

## 2021-07-20 DIAGNOSIS — I48.91 ATRIAL FIBRILLATION, UNSPECIFIED TYPE (H): Primary | ICD-10-CM

## 2021-07-20 DIAGNOSIS — R42 VERTIGO: ICD-10-CM

## 2021-07-21 ENCOUNTER — ANTICOAGULATION THERAPY VISIT (OUTPATIENT)
Dept: FAMILY MEDICINE | Facility: CLINIC | Age: 71
End: 2021-07-21

## 2021-07-21 ENCOUNTER — OFFICE VISIT (OUTPATIENT)
Dept: FAMILY MEDICINE | Facility: CLINIC | Age: 71
End: 2021-07-21
Payer: COMMERCIAL

## 2021-07-21 ENCOUNTER — TELEPHONE (OUTPATIENT)
Dept: FAMILY MEDICINE | Facility: CLINIC | Age: 71
End: 2021-07-21

## 2021-07-21 VITALS
SYSTOLIC BLOOD PRESSURE: 105 MMHG | TEMPERATURE: 98.6 F | DIASTOLIC BLOOD PRESSURE: 63 MMHG | HEART RATE: 66 BPM | RESPIRATION RATE: 12 BRPM

## 2021-07-21 DIAGNOSIS — M79.7 FIBROMYALGIA: ICD-10-CM

## 2021-07-21 DIAGNOSIS — I48.91 ATRIAL FIBRILLATION (H): Primary | ICD-10-CM

## 2021-07-21 DIAGNOSIS — G89.4 CHRONIC PAIN SYNDROME: ICD-10-CM

## 2021-07-21 DIAGNOSIS — G89.4 CHRONIC PAIN SYNDROME: Primary | ICD-10-CM

## 2021-07-21 DIAGNOSIS — I48.91 ATRIAL FIBRILLATION, UNSPECIFIED TYPE (H): ICD-10-CM

## 2021-07-21 DIAGNOSIS — M25.511 TRIGGER POINT OF RIGHT SHOULDER REGION: ICD-10-CM

## 2021-07-21 DIAGNOSIS — E78.2 MIXED HYPERLIPIDEMIA: ICD-10-CM

## 2021-07-21 DIAGNOSIS — E11.649 TYPE 2 DIABETES MELLITUS WITH HYPOGLYCEMIA WITHOUT COMA, WITHOUT LONG-TERM CURRENT USE OF INSULIN (H): ICD-10-CM

## 2021-07-21 DIAGNOSIS — R42 VERTIGO: ICD-10-CM

## 2021-07-21 DIAGNOSIS — Z00.00 HEALTHCARE MAINTENANCE: ICD-10-CM

## 2021-07-21 DIAGNOSIS — E11.42 DIABETIC POLYNEUROPATHY ASSOCIATED WITH TYPE 2 DIABETES MELLITUS (H): ICD-10-CM

## 2021-07-21 DIAGNOSIS — M54.6 TRIGGER POINT OF THORACIC REGION: ICD-10-CM

## 2021-07-21 DIAGNOSIS — Z12.11 SCREEN FOR COLON CANCER: ICD-10-CM

## 2021-07-21 LAB
ALBUMIN SERPL-MCNC: 3.9 G/DL (ref 3.5–5)
ALP SERPL-CCNC: 110 U/L (ref 45–120)
ALT SERPL W P-5'-P-CCNC: 13 U/L (ref 0–45)
ANION GAP SERPL CALCULATED.3IONS-SCNC: 14 MMOL/L (ref 5–18)
AST SERPL W P-5'-P-CCNC: 21 U/L (ref 0–40)
BILIRUB SERPL-MCNC: 0.7 MG/DL (ref 0–1)
BUN SERPL-MCNC: 15 MG/DL (ref 8–28)
CALCIUM SERPL-MCNC: 9.9 MG/DL (ref 8.5–10.5)
CHLORIDE BLD-SCNC: 101 MMOL/L (ref 98–107)
CHOLEST SERPL-MCNC: 164 MG/DL
CO2 SERPL-SCNC: 28 MMOL/L (ref 22–31)
CREAT SERPL-MCNC: 0.66 MG/DL (ref 0.6–1.1)
CREAT UR-MCNC: 28 MG/DL
ERYTHROCYTE [DISTWIDTH] IN BLOOD BY AUTOMATED COUNT: 14.5 % (ref 10–15)
FASTING STATUS PATIENT QL REPORTED: NO
FERRITIN SERPL-MCNC: 27 NG/ML (ref 10–130)
GFR SERPL CREATININE-BSD FRML MDRD: 89 ML/MIN/1.73M2
GLUCOSE BLD-MCNC: 92 MG/DL (ref 70–125)
HBA1C MFR BLD: 5.8 % (ref 0–5.6)
HCT VFR BLD AUTO: 39.3 % (ref 35–47)
HDLC SERPL-MCNC: 79 MG/DL
HGB BLD-MCNC: 11.8 G/DL (ref 11.7–15.7)
INR BLD: 2.9 (ref 0.9–1.1)
IRON SATN MFR SERPL: 11 % (ref 20–50)
IRON SERPL-MCNC: 60 UG/DL (ref 42–175)
LDLC SERPL CALC-MCNC: 72 MG/DL
MCH RBC QN AUTO: 27.8 PG (ref 26.5–33)
MCHC RBC AUTO-ENTMCNC: 30 G/DL (ref 31.5–36.5)
MCV RBC AUTO: 93 FL (ref 78–100)
MICROALBUMIN UR-MCNC: 1.23 MG/DL (ref 0–1.99)
MICROALBUMIN/CREAT UR: 43.9 MG/G CR
PLATELET # BLD AUTO: 213 10E3/UL (ref 150–450)
POTASSIUM BLD-SCNC: 4.2 MMOL/L (ref 3.5–5)
PROT SERPL-MCNC: 7.1 G/DL (ref 6–8)
RBC # BLD AUTO: 4.24 10E6/UL (ref 3.8–5.2)
SODIUM SERPL-SCNC: 143 MMOL/L (ref 136–145)
TIBC SERPL-MCNC: 533 UG/DL (ref 313–563)
TRANSFERRIN SERPL-MCNC: 426 MG/DL (ref 212–360)
TRIGL SERPL-MCNC: 67 MG/DL
WBC # BLD AUTO: 7.2 10E3/UL (ref 4–11)

## 2021-07-21 PROCEDURE — 20553 NJX 1/MLT TRIGGER POINTS 3/>: CPT | Performed by: FAMILY MEDICINE

## 2021-07-21 PROCEDURE — 80307 DRUG TEST PRSMV CHEM ANLYZR: CPT | Performed by: FAMILY MEDICINE

## 2021-07-21 PROCEDURE — 82728 ASSAY OF FERRITIN: CPT | Performed by: FAMILY MEDICINE

## 2021-07-21 PROCEDURE — 83036 HEMOGLOBIN GLYCOSYLATED A1C: CPT | Performed by: FAMILY MEDICINE

## 2021-07-21 PROCEDURE — 36415 COLL VENOUS BLD VENIPUNCTURE: CPT | Performed by: FAMILY MEDICINE

## 2021-07-21 PROCEDURE — 82043 UR ALBUMIN QUANTITATIVE: CPT | Performed by: FAMILY MEDICINE

## 2021-07-21 PROCEDURE — 80061 LIPID PANEL: CPT | Performed by: FAMILY MEDICINE

## 2021-07-21 PROCEDURE — 99214 OFFICE O/P EST MOD 30 MIN: CPT | Mod: 25 | Performed by: FAMILY MEDICINE

## 2021-07-21 PROCEDURE — 80053 COMPREHEN METABOLIC PANEL: CPT | Performed by: FAMILY MEDICINE

## 2021-07-21 PROCEDURE — 85027 COMPLETE CBC AUTOMATED: CPT | Performed by: FAMILY MEDICINE

## 2021-07-21 PROCEDURE — 83540 ASSAY OF IRON: CPT | Performed by: FAMILY MEDICINE

## 2021-07-21 PROCEDURE — 85610 PROTHROMBIN TIME: CPT | Performed by: FAMILY MEDICINE

## 2021-07-21 PROCEDURE — 84466 ASSAY OF TRANSFERRIN: CPT | Performed by: FAMILY MEDICINE

## 2021-07-21 RX ORDER — MECLIZINE HYDROCHLORIDE 25 MG/1
25 TABLET ORAL 3 TIMES DAILY PRN
Qty: 45 TABLET | Refills: 5 | Status: SHIPPED | OUTPATIENT
Start: 2021-07-21 | End: 2022-10-28

## 2021-07-21 RX ORDER — LIDOCAINE HYDROCHLORIDE 10 MG/ML
10 INJECTION, SOLUTION INFILTRATION; PERINEURAL ONCE
Status: COMPLETED | OUTPATIENT
Start: 2021-07-21 | End: 2021-07-21

## 2021-07-21 RX ORDER — OXYCODONE HYDROCHLORIDE 10 MG/1
10 TABLET ORAL EVERY 6 HOURS PRN
Qty: 120 TABLET | Refills: 0 | Status: SHIPPED | OUTPATIENT
Start: 2021-07-21 | End: 2021-07-22 | Stop reason: DRUGHIGH

## 2021-07-21 RX ORDER — LIDOCAINE HYDROCHLORIDE 10 MG/ML
10 INJECTION, SOLUTION INFILTRATION; PERINEURAL
COMMUNITY
Start: 2021-02-28 | End: 2021-07-21

## 2021-07-21 RX ORDER — LIDOCAINE HYDROCHLORIDE 10 MG/ML
10 INJECTION, SOLUTION INFILTRATION; PERINEURAL ONCE
Qty: 10 ML | Refills: 0 | OUTPATIENT
Start: 2021-07-21 | End: 2021-07-21

## 2021-07-21 RX ADMIN — LIDOCAINE HYDROCHLORIDE 10 ML: 10 INJECTION, SOLUTION INFILTRATION; PERINEURAL at 14:34

## 2021-07-21 NOTE — TELEPHONE ENCOUNTER
Patient called back to check on the status of this request. She would like this done today as she knows that provider is out tomorrow.

## 2021-07-21 NOTE — TELEPHONE ENCOUNTER
Reason for Call:  Medication or medication refill:    Do you use a [facility]?  Name of the pharmacy and phone number for the current request:  [facility] on file    Name of the medication requested: oxyCODONE IR (ROXICODONE) 10 MG tablet    Other request: Patient called, pharmacy is out of the 10 mg tablets, but they do have the 5 mg in stock. Requesting provider to send new RX for 5 mg tablets to pharmacy with directions and quantity equalling the 10 mg tablets to the pharmacy.     Can we leave a detailed message on this number? YES    Phone number patient can be reached at: Home number on file [phone] (home)    Best Time: any    Call taken on 7/21/2021 at 1:24 PM by Cuba Hernández

## 2021-07-21 NOTE — TELEPHONE ENCOUNTER
Patient called, pharmacy is out of the 10 mg tablets, but they do have the 5 mg in stock. Requesting provider to send new RX for 5 mg tablets to pharmacy with directions and quantity equalling the 10 mg tablets to the pharmacy.

## 2021-07-21 NOTE — PROGRESS NOTES
ANTICOAGULATION MANAGEMENT     Mary Kay Tejada 71 year old female is on warfarin with therapeutic INR result. (Goal INR 2.0-3.0)    Recent labs: (last 7 days)     07/21/21  0910   INR 2.9*       ASSESSMENT     Source(s): Template       Warfarin doses taken: Warfarin taken as instructed    Diet: No new diet changes identified    New illness, injury, or hospitalization: No    Medication/supplement changes: None noted    Signs or symptoms of bleeding or clotting: No    Previous INR: Supratherapeutic    Additional findings: None     PLAN     Recommended plan for no diet, medication or health factor changes affecting INR     Dosing Instructions: Continue your current warfarin dose with next INR in 2 weeks       Summary  As of 7/21/2021    Full warfarin instructions:  2.5 mg every Sat; 5 mg all other days   Next INR check:  8/4/2021             Detailed voice message left for Mary Kay with dosing instructions and follow up date.     Contact [phone] to schedule and with any changes, questions or concerns.     Education provided: Target INR goal and significance of current INR result    Plan made per [facility] anticoagulation protocol    Marija Crawley RN  Anticoagulation Clinic  7/21/2021    _______________________________________________________________________     Anticoagulation Episode Summary     Current INR goal:  2.0-3.0   TTR:  44.5 % (1 y)   Target end date:  Indefinite   Send INR reminders to:  [facility]    Indications    Paroxysmal Atrial Fibrillation [I48.91]           Comments:           Anticoagulation Care Providers     Provider Role Specialty Phone number    Cammy Bui MD Referring Family Medicine [phone]

## 2021-07-22 LAB
AMPHETAMINES UR QL SCN: ABNORMAL
BARBITURATES UR QL: ABNORMAL
BENZODIAZ UR QL: ABNORMAL
CANNABINOIDS UR QL SCN: ABNORMAL
COCAINE UR QL: ABNORMAL
CREAT UR-MCNC: 17 MG/DL
METHADONE UR QL SCN: ABNORMAL
OPIATES UR QL SCN: ABNORMAL
OXYCODONE UR QL: ABNORMAL
PCP UR QL SCN: ABNORMAL

## 2021-07-22 RX ORDER — OXYCODONE HYDROCHLORIDE 5 MG/1
10 TABLET ORAL EVERY 6 HOURS PRN
Qty: 240 TABLET | Refills: 0 | Status: SHIPPED | OUTPATIENT
Start: 2021-07-22 | End: 2021-08-23

## 2021-07-22 ASSESSMENT — ENCOUNTER SYMPTOMS
FEVER: 0
MYALGIAS: 1
EYE PAIN: 0
HEMATURIA: 0
COUGH: 0
PALPITATIONS: 0
VOMITING: 0
BLOOD IN STOOL: 0
SHORTNESS OF BREATH: 0
CHILLS: 0
ARTHRALGIAS: 0
BACK PAIN: 0
DYSURIA: 0
COLOR CHANGE: 0
SEIZURES: 0
NECK PAIN: 1
ABDOMINAL PAIN: 0
SORE THROAT: 0

## 2021-07-22 NOTE — PROGRESS NOTES
[facility]    Procedure: MSK: Inject Trigger Points, >3    Date/Time: 7/21/2021 9:43 AM  Performed by: Cammy Bui MD  Authorized by: Cammy Bui MD     UNIVERSAL PROTOCOL   Site Marked: Yes  Prior Images Obtained and Reviewed:  NA  Required items: Required blood products, implants, devices and special equipment available    Patient identity confirmed:  Verbally with patient  NA - No sedation, light sedation, or local anesthesia  Confirmation Checklist:  Procedure was appropriate and matched the consent or emergent situation  Time out: Immediately prior to the procedure a time out was called    Universal Protocol: the Joint Commission Universal Protocol was followed    Preparation: Patient was prepped and draped in usual sterile fashion    ESBL (mL):  0         ANESTHESIA    Anesthesia: Local infiltration  Local Anesthetic:  Lidocaine 1% without epinephrine  Anesthetic Total (mL):  10      SEDATION    Patient Sedated: No    PROCEDURE   Patient Tolerance:  Patient tolerated the procedure well with no immediate complications  Describe Procedure: Procedure Note - Trigger Point Injections    Consent obtained: verbal    Indication:  Fibromyalgia with trigger points upper back/distal cervical - pain control     6 trigger points identified.  Each trigger point swabbed x 3 with Betadine swab.  Each trigger point then injected with 1.6 ml of 1% Lidocaine (no epi).    Total volume injected:  10 ml    Complications:  None, patient tolerated procedure well.    Plan:  Discussed care with patient.  RTC for further injections in 30 days prn.     No sedation used nor necessary for this procedure -  monitoring required  Length of time physician/provider present for 1:1 monitoring during sedation: 0    Assessment & Plan    1. Trigger point of right shoulder region  - lidocaine 1 % injection 10 mL  2. Trigger point of thoracic region  3. Fibromyalgia    This is a 72 yo  Rapid Medical Evaluation


Chief Complaint: Alcohol intoxication


Time Seen by Provider: 10/19/18 15:03


Medical Evaluation: 


 Allergies











Allergy/AdvReac Type Severity Reaction Status Date / Time


 


No Known Allergies Allergy   Verified 10/19/18 11:09











10/19/18 14:54


I have performed a brief in-person evaluation of this patient.





The patient presents with a chief complaint of: Patient sent from [facility] for 

alcohol intoxication. Patient report drinking 5 bottles of vodka  and few 

bottles





Pertinent physical exam findings: Patient able to give hx and talking in full 

sentences . A&O x 3. 





I have ordered the following: CBC, CMP. Utox





The patient will proceed to the ED for further evaluation.








 


10/19/18 15:03








**Discharge Disposition





- Diagnosis


Alcohol intoxication


Qualifiers:


 Complication of substance-induced condition: uncomplicated Qualified Code(s): 

F10.920 - Alcohol use, unspecified with intoxication, uncomplicated








- Referrals





- Patient Instructions





- Post Discharge Activity "female with fibromyalgia/chronic pain syndrome - patient does trigger point injections to maintain tolerance to pain syndrome - and keep opiate use to a minimum    4. Chronic pain syndrome  As above - patient uses opiates for pain control due to h/o GI bleed - chronic pain x years - no increase in pain medication use over time.  Will renew meds/check urine tox screen.   - Urine Drugs of Abuse Screen Panel 13; Future  - CBC with platelets  - Iron binding panel  - Ferritin  - Urine Drugs of Abuse Screen Panel 1+ - Drug Screen plus Methadone; Future  - Urine Drugs of Abuse Screen Panel 1+ - Drug Screen plus Methadone    5. Diabetic polyneuropathy associated with type 2 diabetes mellitus (H)  Patient due for diabetes screening - has been generally well controlled - check labs - encourage compliance    6. Type 2 diabetes mellitus with hypoglycemia without coma, without long-term current use of insulin (H)  As above - check labs  - Hemoglobin A1c  - Comprehensive metabolic panel  - Albumin Random Urine Quantitative    7. Mixed hyperlipidemia  On Atorvastatin - tolerating well   - Lipid Profile    8. Atrial fibrillation, unspecified type (H)  No new symptoms - rate controlled -   - INR point of care    9. Vertigo  No new symptoms - refill Meclizine  - meclizine (ANTIVERT) 25 MG tablet; Take 1 tablet (25 mg) by mouth 3 times daily as needed for dizziness or nausea  Dispense: 45 tablet; Refill: 5    10. Screen for colon cancer  Discussed recommendation - refuses today     11. Healthcare maintenance  Reviewed  orders  - REVIEW OF HEALTH MAINTENANCE PROTOCOL ORDERS      Return in about 4 weeks (around 8/18/2021) for trigger point injections.    Chief Complaint   Patient presents with     Trigger Point Injection      HPI  Here for :  1.  Trigger point injections - \"lots of pain - getting worse on left side  2.  Needs refills on medications  3.  Feeling okay - no sign of bleeding - no chest pain, no shortness of breath     "     Patient Active Problem List   Diagnosis     Abnormal Weight Loss     Osteoarthritis Of The Shoulder     Chronic Constipation     Onychomycosis Of The Toenails     Diarrhea     Ganglion Of The Left Wrist     Larynx Edema     Obesity     Medial Epicondylitis     Skin Lesion     Urinary Incontinence     Chronic Major Depression     Dry Eye Syndrome     Nicotine Dependence     Hypokalemia     Essential hypertension     Muscle Cramps In The Calf     Edema     Atrial flutter (H)     Lump In / On The Skin     Chronic pain syndrome     Paroxysmal Atrial Fibrillation     Fibromyalgia     Nausea     Abdominal Pain     Peptic Ulcer     COPD exacerbation (H)     Mixed hyperlipidemia     Chronic Reflux Esophagitis     Benign Adenomatous Polyp Of The Large Intestine     Vertigo     Rotator cuff tendonitis     Generalized osteoarthrosis, involving multiple sites     Tendonitis of wrist, left     Trigger point of thoracic region     Flashers or floaters of right eye     Menopause     Tendonitis of wrist, right     Skin lesion of face     Hematoma of abdominal wall     Headache     Cervical neck pain with evidence of disc disease     Controlled substance agreement signed     Aspiration pneumonia (H)     Type 2 diabetes mellitus with hypoglycemia (H)     Candidal intertrigo     Osteoarthritis, hip, bilateral     Primary osteoarthritis of left knee     Osteoarthritis of both knees     Syncope     Opacity of lung on imaging study     Acute gastritis     Chronic obstructive pulmonary disease with acute lower respiratory infection (H)     Gastroenteritis     Nonrheumatic aortic valve stenosis     COPD (chronic obstructive pulmonary disease) (H)     Bunion, left     Callus of foot     Cataract     Chronic anemia     Anemia due to blood loss, acute     Diabetic polyneuropathy associated with type 2 diabetes mellitus (H)     Upper GI bleed     Melena     Dyslipidemia     Skin lesion     Mixed stress and urge urinary incontinence      "Primary osteoarthritis of both knees     Advanced directives, counseling/discussion     Chronic gastric ulcer without hemorrhage and without perforation        Past Medical History:   Diagnosis Date     Anemia      Aortic stenosis      Atrial fibrillation (H)      Atrial flutter (H)      Benign neoplasm of adenomatous polyp     large intestine      Chronic constipation      Chronic pain syndrome      COPD (chronic obstructive pulmonary disease) (H)     Oxygen at night      Dependence on supplemental oxygen     Oxygen at noc, during the day as needed     Depression      Diabetes mellitus (H)      Dry eye syndrome      Fibromyalgia      Ganglion     left wrist     GERD (gastroesophageal reflux disease)      Hyperlipidemia      Hypertension      Hypokalemia      Infective otitis externa, unspecified     Created by Conversion      Larynx edema      Lung disease      Malignant neoplasm of vulva (H)     Created by Conversion [facility] Annotation: Apr 17 2007  8:24AM - Cammy Bui:  resection per Dr. Alfonso Mane 9/06;  Replacement Utility updated for latest IMO load     Medial epicondylitis      Onychomycosis      Osteoarthritis      Peptic ulcer      Polyneuropathy      Vulvar malignant neoplasm (H)         Current Outpatient Medications   Medication     ACCU-CHEK SOFTCLIX LANCETS lancets     albuterol (PROAIR HFA;PROVENTIL HFA;VENTOLIN HFA) 90 mcg/actuation inhaler     atorvastatin (LIPITOR) 20 MG tablet     BD ULTRA-FINE SHORT PEN NEEDLE 31 gauge x 5/16\" Ndle     blood-glucose meter (ONETOUCH VERIO IQ METER) Misc     budesonide-formoteroL (SYMBICORT) 160-4.5 mcg/actuation inhaler     cyclobenzaprine (FLEXERIL) 10 MG tablet     diaper,brief,adult,disposable (ADULT BRIEFS - LARGE) Misc     diltiazem (CARDIZEM CD) 120 MG 24 hr capsule     furosemide (LASIX) 20 MG tablet     gabapentin (NEURONTIN) 600 MG tablet     generic lancets (FINGERSTIX LANCETS)     meclizine (ANTIVERT) 25 MG tablet     metFORMIN " (GLUCOPHAGE) 500 MG tablet     mometasone-formoterol (DULERA) 200-5 mcg/actuation HFAA inhaler     nystatin (NYSTOP) powder     omeprazole (PRILOSEC) 20 MG capsule     ONETOUCH VERIO TEST STRIPS strips     sucralfate (CARAFATE) 1 gram tablet     warfarin ANTICOAGULANT (COUMADIN/JANTOVEN) 5 MG tablet     albuterol (PROVENTIL) 2.5 mg /3 mL (0.083 %) nebulizer solution     famotidine (PEPCID) 20 MG tablet     ipratropium-albuterol (DUO-NEB) 0.5-2.5 mg/3 mL nebulizer     nicotine (NICODERM CQ) 21 mg/24 hr     oxyCODONE (ROXICODONE) 5 MG tablet     polyethylene glycol (MIRALAX) 17 gram packet     No current facility-administered medications for this visit.        Past Surgical History:   Procedure Laterality Date     BIOPSY BREAST Right      BIOPSY BREAST Right 01/28/2015     BIOPSY BREAST Right 1/28/2015    Procedure: RIGHT BREAST BIOPSY AFTER WIRE LOCALIZATION AT 0940;  Surgeon: Renée Soriano MD;  Location: [facility];  Service:      BIOPSY OF BREAST, INCISIONAL      Description: Incisional Breast Biopsy;  Recorded: 11/13/2007;  Comments: benign     C SUPRACERV ABD HYSTERECTOMY      Description: Supracervical Hysterectomy;  Proc Date: 01/01/1985;  Comments: some cervix left!; ovaries intact; done for bleeding     COLONOSCOPY N/A 6/14/2019    Procedure: COLONOSCOPY;  Surgeon: Eduardo Mora MD;  Location: [facility];  Service: Gastroenterology     ESOPHAGOSCOPY, GASTROSCOPY, DUODENOSCOPY (EGD), COMBINED N/A 11/6/2018    Procedure: ESOPHAGOGASTRODUODENOSCOPY;  Surgeon: Lit Fernando MD;  Location: [facility];  Service:      HYSTERECTOMY       JOINT REPLACEMENT Left     TKA     GA ABLATE HEART DYSRHYTHM FOCUS      Description: Catheter Ablation Atrial Fibrillation;  Recorded: 07/31/2012;  Comments: 7/24/12 PVI with Dr. Gardiner and nilay to all 5 pulm veins and CTI fl ablation line as well.        Social History     Socioeconomic History     Marital status:      Spouse name: Not on  file     Number of children: Not on file     Years of education: Not on file     Highest education level: Not on file   Occupational History     Not on file   Tobacco Use     Smoking status: Current Every Day Smoker     Packs/day: 0.50     Types: Cigarettes     Smokeless tobacco: Never Used   Substance and Sexual Activity     Alcohol use: Yes     Comment: Alcoholic Drinks/day: very little     Drug use: No     Sexual activity: Not on file   Other Topics Concern     Not on file   Social History Narrative     Not on file     Social Determinants of Health     Financial Resource Strain:      Difficulty of Paying Living Expenses:    Food Insecurity:      Worried About Running Out of Food in the Last Year:      Ran Out of Food in the Last Year:    Transportation Needs:      Lack of Transportation (Medical):      Lack of Transportation (Non-Medical):    Physical Activity:      Days of Exercise per Week:      Minutes of Exercise per Session:    Stress:      Feeling of Stress :    Social Connections:      Frequency of Communication with Friends and Family:      Frequency of Social Gatherings with Friends and Family:      Attends Oriental orthodox Services:      Active Member of Clubs or Organizations:      Attends Club or Organization Meetings:      Marital Status:    Intimate Partner Violence:      Fear of Current or Ex-Partner:      Emotionally Abused:      Physically Abused:      Sexually Abused:         Family History   Problem Relation Age of Onset     Heart Failure Mother      Cancer Other         paternal HX-laryngeal      Alcoholism Sister      No Known Problems Daughter      No Known Problems Maternal Grandmother      No Known Problems Maternal Grandfather      No Known Problems Paternal Grandmother      No Known Problems Paternal Grandfather      No Known Problems Maternal Aunt      No Known Problems Paternal Aunt      Alcoholism Sister      Alcoholism Brother      Alcoholism Father      Cancer Paternal Uncle          Gastric-Alcohol     Cancer Paternal Uncle         gastric-Alcohol     Hereditary Breast and Ovarian Cancer Syndrome No family hx of      Breast Cancer No family hx of      Colon Cancer No family hx of      Endometrial Cancer No family hx of      Ovarian Cancer No family hx of         Review of Systems   Constitutional: Negative for chills and fever.   HENT: Negative for ear pain and sore throat.    Eyes: Negative for pain and visual disturbance.   Respiratory: Negative for cough and shortness of breath.    Cardiovascular: Negative for chest pain and palpitations.   Gastrointestinal: Negative for abdominal pain, blood in stool and vomiting.   Genitourinary: Negative for dysuria and hematuria.   Musculoskeletal: Positive for myalgias and neck pain. Negative for arthralgias and back pain.   Skin: Negative for color change and rash.   Neurological: Negative for seizures and syncope.   All other systems reviewed and are negative.       /63 (BP Location: Left arm, Patient Position: Sitting, Cuff Size: Adult Small)   Pulse 66   Temp 98.6  F (37  C) (Temporal)   Resp 12      Physical Exam  Vitals reviewed.   Constitutional:       General: She is not in acute distress.     Appearance: Normal appearance.   HENT:      Head: Normocephalic.      Right Ear: Tympanic membrane, ear canal and external ear normal.      Left Ear: Tympanic membrane, ear canal and external ear normal.      Nose: Nose normal.      Mouth/Throat:      Mouth: Mucous membranes are moist.      Pharynx: No posterior oropharyngeal erythema.   Eyes:      Extraocular Movements: Extraocular movements intact.      Conjunctiva/sclera: Conjunctivae normal.      Pupils: Pupils are equal, round, and reactive to light.   Cardiovascular:      Rate and Rhythm: Normal rate and regular rhythm.      Pulses: Normal pulses.      Heart sounds: Normal heart sounds. No murmur heard.     Pulmonary:      Effort: Pulmonary effort is normal.      Breath sounds: Normal breath  sounds.   Abdominal:      Palpations: Abdomen is soft. There is no mass.      Tenderness: There is no abdominal tenderness. There is no guarding or rebound.   Musculoskeletal:         General: No deformity (DJD - knees/hips/shoulders). Normal range of motion.      Cervical back: Normal range of motion and neck supple.   Lymphadenopathy:      Cervical: No cervical adenopathy.   Skin:     General: Skin is warm and dry.   Neurological:      General: No focal deficit present.      Mental Status: She is alert.   Psychiatric:         Mood and Affect: Mood normal.         Behavior: Behavior normal.          Results:  Results for orders placed or performed in visit on 07/21/21   Hemoglobin A1c     Status: Abnormal   Result Value Ref Range    Hemoglobin A1C 5.8 (H) 0.0 - 5.6 %   Comprehensive metabolic panel     Status: Normal   Result Value Ref Range    Sodium 143 136 - 145 mmol/L    Potassium 4.2 3.5 - 5.0 mmol/L    Chloride 101 98 - 107 mmol/L    Carbon Dioxide (CO2) 28 22 - 31 mmol/L    Anion Gap 14 5 - 18 mmol/L    Urea Nitrogen 15 8 - 28 mg/dL    Creatinine 0.66 0.60 - 1.10 mg/dL    Calcium 9.9 8.5 - 10.5 mg/dL    Glucose 92 70 - 125 mg/dL    Alkaline Phosphatase 110 45 - 120 U/L    AST 21 0 - 40 U/L    ALT 13 0 - 45 U/L    Protein Total 7.1 6.0 - 8.0 g/dL    Albumin 3.9 3.5 - 5.0 g/dL    Bilirubin Total 0.7 0.0 - 1.0 mg/dL    GFR Estimate 89 >60 mL/min/1.73m2   Lipid Profile     Status: None   Result Value Ref Range    Cholesterol 164 <=199 mg/dL    Triglycerides 67 <=149 mg/dL    Direct Measure HDL 79 >=50 mg/dL    LDL Cholesterol Calculated 72 <=129 mg/dL    Patient Fasting > 8hrs? No    CBC with platelets     Status: Abnormal   Result Value Ref Range    WBC Count 7.2 4.0 - 11.0 10e3/uL    RBC Count 4.24 3.80 - 5.20 10e6/uL    Hemoglobin 11.8 11.7 - 15.7 g/dL    Hematocrit 39.3 35.0 - 47.0 %    MCV 93 78 - 100 fL    MCH 27.8 26.5 - 33.0 pg    MCHC 30.0 (L) 31.5 - 36.5 g/dL    RDW 14.5 10.0 - 15.0 %    Platelet Count  213 150 - 450 10e3/uL   Iron binding panel     Status: Abnormal   Result Value Ref Range    Iron 60 42 - 175 ug/dL    Transferrin 426 (H) 212 - 360 mg/dL    Transferrin Saturation, Calculated 11 (L) 20 - 50 %    Transferrin IBC, Calculated 533 313 - 563 ug/dL   Ferritin     Status: Normal   Result Value Ref Range    Ferritin 27 10 - 130 ng/mL   INR point of care     Status: Abnormal   Result Value Ref Range    INR 2.9 (H) 0.9 - 1.1    Narrative    This test is intended for monitoring Coumadin therapy. Results are not accurate in patients with prolonged INR due to factor deficiency.   Albumin Random Urine Quantitative     Status: Abnormal   Result Value Ref Range    Microalbumin Urine mg/dL 1.23 0.00 - 1.99 mg/dL    Creatinine Urine mg/dL 28 mg/dL    Microalbumin Urine mg/g Cr 43.9 (H) <=19.9 mg/g Cr    Narrative    Microalbumin, Random Urine   <2.0 mg/dL . . . . . . . . Normal   3.0-30.0 mg/dL . . . . . . Microalbuminuria   >30.0 mg/dL . . . . . .  . Clinical Proteinuria     Microalbumin/Creatinine Ratio, Random Urine   <20 mg/g . . . . .. . . . Normal    mg/g . . . . . . . Microalbuminuria   >300 mg/g . . . . . . . . Clinical Proteinuria   Drugs of Abuse 1+ Panel, Urine ([facility] Only)     Status: Abnormal   Result Value Ref Range    Amphetamines Urine Screen Negative Screen Negative    Benzodiazepines Urine Screen Negative Screen Negative    Opiates Urine Screen Negative Screen Negative    PCP Urine Screen Negative Screen Negative    Cannabinoids Urine Screen Positive (A) Screen Negative    Barbiturates Urine Screen Negative Screen Negative    Cocaine Urine Screen Negative Screen Negative    Methadone Urine Screen Negative Screen Negative    Oxycodone Urine Screen Negative Screen Negative    Creatinine Urine mg/dL 17 mg/dL    Narrative    Drug                           Screening Threshold    Amphetamines                    1000 ng/mL  Benzodiazepine                   200 ng/mL  Opiates                          " 300 ng/mL  Phencyclidine                     25 ng/mL  THC Metabolite                    50 ng/mL  Barbiturates                     200 ng/mL  Cocaine Metabolite               150 ng/mL  Methadone                        300 ng/mL  Oxycodone                        100 ng/mL    Screening results are to be used only for medical purposes.  Unconfirmed screening results are not to be used for non-  medical purposes.   Urine Drugs of Abuse Screen Panel 1+ - Drug Screen plus Methadone     Status: Abnormal    Narrative    The following orders were created for panel order Urine Drugs of Abuse Screen Panel 1+ - Drug Screen plus Methadone.  Procedure                               Abnormality         Status                     ---------                               -----------         ------                     Drugs of Abuse 1+ Panel,...[[id number]]  Abnormal            Final result                 Please view results for these tests on the individual orders.       Medications at Conclusion of Visit:  Current Outpatient Medications   Medication Sig Dispense Refill     ACCU-CHEK SOFTCLIX LANCETS lancets [ACCU-CHEK SOFTCLIX LANCETS LANCETS] TEST THREE TIMES DAILY 300 each 3     albuterol (PROAIR HFA;PROVENTIL HFA;VENTOLIN HFA) 90 mcg/actuation inhaler [ALBUTEROL (PROAIR HFA;PROVENTIL HFA;VENTOLIN HFA) 90 MCG/ACTUATION INHALER] INHALE 2 PUFFS EVERY 4 HOURS AS NEEDED WHEEZING 90 g 11     atorvastatin (LIPITOR) 20 MG tablet [ATORVASTATIN (LIPITOR) 20 MG TABLET] TAKE 1 TABLET(20 MG) BY MOUTH AT BEDTIME 90 tablet 3     BD ULTRA-FINE SHORT PEN NEEDLE 31 gauge x 5/16\" Ndle [BD ULTRA-FINE SHORT PEN NEEDLE 31 GAUGE X 5/16\" NDLE] TEST FOUR TIMES DAILY WITH MEALS AND AT BEDTIME 400 each 3     blood-glucose meter (ONETOUCH VERIO IQ METER) Misc [BLOOD-GLUCOSE METER (ONETOUCH VERIO IQ METER) MISC] Check blood sugar three times a day. 1 each 0     budesonide-formoteroL (SYMBICORT) 160-4.5 mcg/actuation inhaler [BUDESONIDE-FORMOTEROL " (SYMBICORT) 160-4.5 MCG/ACTUATION INHALER] Inhale 2 puffs 2 (two) times a day. Inhale 2 puff by mouth twice daily 1 Inhaler 5     cyclobenzaprine (FLEXERIL) 10 MG tablet [CYCLOBENZAPRINE (FLEXERIL) 10 MG TABLET] TAKE 1 TABLET(10 MG) BY MOUTH AT BEDTIME AS NEEDED FOR MUSCLE SPASMS 30 tablet 0     diaper,brief,adult,disposable (ADULT BRIEFS - LARGE) Misc [DIAPER,BRIEF,ADULT,DISPOSABLE (ADULT BRIEFS - LARGE) MISC] Use 3-4 daily as needed for incontinence 120 each 6     diltiazem (CARDIZEM CD) 120 MG 24 hr capsule [DILTIAZEM (CARDIZEM CD) 120 MG 24 HR CAPSULE] Take 1 capsule (120 mg total) by mouth daily. 90 capsule 1     furosemide (LASIX) 20 MG tablet [FUROSEMIDE (LASIX) 20 MG TABLET] TAKE 1 TABLET(20 MG) BY MOUTH DAILY AS NEEDED 90 tablet 3     gabapentin (NEURONTIN) 600 MG tablet [GABAPENTIN (NEURONTIN) 600 MG TABLET] TAKE 1 TABLET(600 MG) BY MOUTH THREE TIMES DAILY 270 tablet 3     generic lancets (FINGERSTIX LANCETS) [GENERIC LANCETS (FINGERSTIX LANCETS)] Dispense brand per patient's insurance at pharmacy discretion. 300 each 0     meclizine (ANTIVERT) 25 MG tablet Take 1 tablet (25 mg) by mouth 3 times daily as needed for dizziness or nausea 45 tablet 5     metFORMIN (GLUCOPHAGE) 500 MG tablet [METFORMIN (GLUCOPHAGE) 500 MG TABLET] TAKE 1 TABLET(500 MG) BY MOUTH TWICE DAILY WITH MEALS 180 tablet 3     mometasone-formoterol (DULERA) 200-5 mcg/actuation HFAA inhaler [MOMETASONE-FORMOTEROL (DULERA) 200-5 MCG/ACTUATION HFAA INHALER] Inhale 2 puffs 2 (two) times a day. 1 Inhaler 5     nystatin (NYSTOP) powder [NYSTATIN (NYSTOP) POWDER] Apply 1 application topically 2 (two) times a day as needed. 2-3 times to affected area(s). 60 g 3     omeprazole (PRILOSEC) 20 MG capsule [OMEPRAZOLE (PRILOSEC) 20 MG CAPSULE] TAKE 1 CAPSULE(20 MG) BY MOUTH DAILY BEFORE BREAKFAST 30 capsule 3     ONETOUCH VERIO TEST STRIPS strips [ONETOUCH VERIO TEST STRIPS STRIPS] USE 1 EACH AS DIRECTED THREE TIMES DAILY AS NEEDED 300 strip 3      sucralfate (CARAFATE) 1 gram tablet [SUCRALFATE (CARAFATE) 1 GRAM TABLET] TAKE 1 TABLET BY MOUTH FOUR TIMES DAILY(CRUSH TABLET AND MIX IT WITH A LITTLE WATER, THEN SWALLOW) 120 tablet 12     warfarin ANTICOAGULANT (COUMADIN/JANTOVEN) 5 MG tablet [WARFARIN ANTICOAGULANT (COUMADIN/JANTOVEN) 5 MG TABLET] Take 1 tablet (5 mg) daily as directed.  Adjust dose per INR results. 90 tablet 1     albuterol (PROVENTIL) 2.5 mg /3 mL (0.083 %) nebulizer solution [ALBUTEROL (PROVENTIL) 2.5 MG /3 ML (0.083 %) NEBULIZER SOLUTION] Take 3 mL (2.5 mg total) by nebulization every 4 (four) hours as needed for wheezing or shortness of breath. (Patient not taking: Reported on 7/21/2021) 75 mL 12     famotidine (PEPCID) 20 MG tablet [FAMOTIDINE (PEPCID) 20 MG TABLET] Take 1 tablet (20 mg total) by mouth 2 (two) times a day. (Patient not taking: Reported on 7/21/2021) 180 tablet 3     ipratropium-albuterol (DUO-NEB) 0.5-2.5 mg/3 mL nebulizer [IPRATROPIUM-ALBUTEROL (DUO-NEB) 0.5-2.5 MG/3 ML NEBULIZER] INAHALE 1 VIAL IN NEBULIZER EVERY 4 HOURS AS NEEDED (Patient not taking: Reported on 7/21/2021) 1,440 mL 0     nicotine (NICODERM CQ) 21 mg/24 hr [NICOTINE (NICODERM CQ) 21 MG/24 HR] Place 1 patch on the skin daily. (Patient not taking: Reported on 7/21/2021) 30 patch 1     oxyCODONE (ROXICODONE) 5 MG tablet Take 2 tablets (10 mg) by mouth every 6 hours as needed for moderate to severe pain 240 tablet 0     polyethylene glycol (MIRALAX) 17 gram packet [POLYETHYLENE GLYCOL (MIRALAX) 17 GRAM PACKET] Take 1 packet (17 g total) by mouth daily. (Patient not taking: Reported on 7/21/2021)  0         SAVANA SILVER MD

## 2021-07-25 RX ORDER — MECLIZINE HYDROCHLORIDE 25 MG/1
TABLET ORAL
Qty: 45 TABLET | Refills: 5 | OUTPATIENT
Start: 2021-07-25

## 2021-08-02 ENCOUNTER — TELEPHONE (OUTPATIENT)
Dept: FAMILY MEDICINE | Facility: CLINIC | Age: 71
End: 2021-08-02

## 2021-08-02 DIAGNOSIS — I48.92 ATRIAL FLUTTER, UNSPECIFIED TYPE (H): ICD-10-CM

## 2021-08-02 DIAGNOSIS — J44.1 COPD EXACERBATION (H): ICD-10-CM

## 2021-08-02 DIAGNOSIS — J20.9 ACUTE BRONCHITIS, UNSPECIFIED ORGANISM: Primary | ICD-10-CM

## 2021-08-02 RX ORDER — DILTIAZEM HYDROCHLORIDE 120 MG/1
120 CAPSULE, COATED, EXTENDED RELEASE ORAL DAILY
Qty: 90 CAPSULE | Refills: 1 | Status: SHIPPED | OUTPATIENT
Start: 2021-08-02 | End: 2022-02-28

## 2021-08-02 NOTE — TELEPHONE ENCOUNTER
"Reason for Call:  Medication or medication refill:    Do you use a [facility]?  Name of the pharmacy and phone number for the current request:  [facility] on file    Name of the medication requested: antibiotics and prednisone    Other request: Patient called to request if provider would send in RX for antibiotics and prednisone to pharmacy. She stated that she has the \"crud\", coughing up green and yellow phlegm and chest congestion. Please send RX to pharmacy on file.    Can we leave a detailed message on this number? YES    Phone number patient can be reached at: Home number on file [phone] (home)    Best Time: any    Call taken on 8/2/2021 at 8:17 AM by Cuba Hernández      "

## 2021-08-03 DIAGNOSIS — M79.7 FIBROMYALGIA: ICD-10-CM

## 2021-08-03 RX ORDER — PREDNISONE 20 MG/1
40 TABLET ORAL DAILY
Qty: 10 TABLET | Refills: 0 | Status: SHIPPED | OUTPATIENT
Start: 2021-08-03 | End: 2021-08-08

## 2021-08-03 RX ORDER — CEFDINIR 300 MG/1
300 CAPSULE ORAL 2 TIMES DAILY
Qty: 20 CAPSULE | Refills: 0 | Status: SHIPPED | OUTPATIENT
Start: 2021-08-03 | End: 2021-08-13

## 2021-08-03 NOTE — TELEPHONE ENCOUNTER
"Pt is wanting prednisone and an abx, she stated that she has the \"crud\", coughing up green and yellow phlegm and chest congestion  "

## 2021-08-03 NOTE — TELEPHONE ENCOUNTER
Patient called back to check on the status of this request. Please let patient know if provider would send in RX for her or she needs to schedule an appt to be seen.

## 2021-08-04 ENCOUNTER — TELEPHONE (OUTPATIENT)
Dept: ANTICOAGULATION | Facility: CLINIC | Age: 71
End: 2021-08-04

## 2021-08-04 RX ORDER — CYCLOBENZAPRINE HCL 10 MG
10 TABLET ORAL
Qty: 30 TABLET | Refills: 0 | Status: SHIPPED | OUTPATIENT
Start: 2021-08-04 | End: 2022-07-31

## 2021-08-05 NOTE — TELEPHONE ENCOUNTER
ANTICOAGULATION  MANAGEMENT     Interacting Medication Review    Received BPA for azithromycin, however this was not actually prescribed.    Interacting medication(s): cefdinir and prednisone with warfarin.    Duration: pred x5d (8/3-8/8); cefdinir x10d (8/3-8/13)    Indication: bronchitis    New medication?: Yes, interaction may increase INR and risk of bleeding       PLAN     Continue current warfarin dose. Recommend to check INR ASAP as already overdue.    Spoke with Mary Kay. Informed of the potential interaction of the cefdinir and prednisone with her warfarin. Recommended checking INR Mon/Tu. Mary Kay declined to schedule at this time as she need to make sure she has transportation available.    No adjustment to Anticoagulation Calendar was required    Yolis Hdz RN

## 2021-08-16 ENCOUNTER — MEDICAL CORRESPONDENCE (OUTPATIENT)
Dept: HEALTH INFORMATION MANAGEMENT | Facility: CLINIC | Age: 71
End: 2021-08-16

## 2021-08-21 DIAGNOSIS — R60.0 LOCALIZED EDEMA: ICD-10-CM

## 2021-08-23 ENCOUNTER — ANTICOAGULATION THERAPY VISIT (OUTPATIENT)
Dept: ANTICOAGULATION | Facility: CLINIC | Age: 71
End: 2021-08-23

## 2021-08-23 ENCOUNTER — OFFICE VISIT (OUTPATIENT)
Dept: FAMILY MEDICINE | Facility: CLINIC | Age: 71
End: 2021-08-23
Payer: COMMERCIAL

## 2021-08-23 VITALS
OXYGEN SATURATION: 96 % | DIASTOLIC BLOOD PRESSURE: 64 MMHG | BODY MASS INDEX: 26.55 KG/M2 | TEMPERATURE: 98.3 F | HEART RATE: 70 BPM | WEIGHT: 154.7 LBS | SYSTOLIC BLOOD PRESSURE: 108 MMHG

## 2021-08-23 DIAGNOSIS — I48.91 ATRIAL FIBRILLATION, UNSPECIFIED TYPE (H): ICD-10-CM

## 2021-08-23 DIAGNOSIS — M79.7 FIBROMYALGIA: ICD-10-CM

## 2021-08-23 DIAGNOSIS — G89.4 CHRONIC PAIN SYNDROME: Primary | ICD-10-CM

## 2021-08-23 DIAGNOSIS — I48.91 ATRIAL FIBRILLATION (H): Primary | ICD-10-CM

## 2021-08-23 LAB — INR BLD: 2.4 (ref 0.9–1.1)

## 2021-08-23 PROCEDURE — 85610 PROTHROMBIN TIME: CPT | Performed by: FAMILY MEDICINE

## 2021-08-23 PROCEDURE — 99213 OFFICE O/P EST LOW 20 MIN: CPT | Mod: 25 | Performed by: FAMILY MEDICINE

## 2021-08-23 PROCEDURE — 36416 COLLJ CAPILLARY BLOOD SPEC: CPT | Performed by: FAMILY MEDICINE

## 2021-08-23 PROCEDURE — 20553 NJX 1/MLT TRIGGER POINTS 3/>: CPT | Performed by: FAMILY MEDICINE

## 2021-08-23 RX ORDER — OXYCODONE HYDROCHLORIDE 10 MG/1
10 TABLET ORAL EVERY 6 HOURS PRN
Qty: 120 TABLET | Refills: 0 | Status: SHIPPED | OUTPATIENT
Start: 2021-08-23 | End: 2021-09-24

## 2021-08-23 ASSESSMENT — PATIENT HEALTH QUESTIONNAIRE - PHQ9: SUM OF ALL RESPONSES TO PHQ QUESTIONS 1-9: 6

## 2021-08-23 NOTE — PROGRESS NOTES
"Assessment & Plan    1. Chronic pain syndrome  This is a 70 yo female with chronic pain related to fibromyalgia/rheumatoid arthritis  Unable to take NSAIDs due to gastric ulcer (last bleeding within last 1-2 years).   - Urine Drugs of Abuse Screen Panel 1 - Drug Screen (Full); Future  - oxyCODONE (ROXICODONE) 10 MG tablet; Take 1 tablet (10 mg) by mouth every 6 hours as needed for moderate to severe pain  Dispense: 120 tablet; Refill: 0    2. Fibromyalgia  As above- trigger point injections for fibromyalgia  - Urine Drugs of Abuse Screen Panel 1 - Drug Screen (Full); Future  - oxyCODONE (ROXICODONE) 10 MG tablet; Take 1 tablet (10 mg) by mouth every 6 hours as needed for moderate to severe pain  Dispense: 120 tablet; Refill: 0    3. Atrial fibrillation, unspecified type (H)  Chronic - on warfarin   - INR point of care         Return in about 4 weeks (around 9/20/2021) for trigger point injections.    Chief Complaint   Patient presents with     Imm/Inj     Medication Refill     Medication Request     for memory      HPI  Addressed negative urine drug screen -   Tells me she runs out at end of month -   Usually goes 3-4 days at end of month without her medication -   Denies diversion - denies anyone at home that is taking her medication -   \"I am forgetful - I might take extras occasionally\"  For sure, takes 4 Oxycodone every day - \"to be functional\"  Sometimes only 3, but \"it depends\" on how it hurts  \"they work for the pain, but I don't want to be addicted to them\"      Weight stable  Felt something \"floating\" in abdomen - it went away - hasn't felt it for a few days         Patient Active Problem List   Diagnosis     Abnormal Weight Loss     Osteoarthritis Of The Shoulder     Chronic Constipation     Onychomycosis Of The Toenails     Diarrhea     Ganglion Of The Left Wrist     Larynx Edema     Obesity     Medial Epicondylitis     Skin Lesion     Urinary Incontinence     Chronic Major Depression     Dry Eye Syndrome "     Nicotine Dependence     Hypokalemia     Essential hypertension     Muscle Cramps In The Calf     Edema     Atrial flutter (H)     Lump In / On The Skin     Chronic pain syndrome     Paroxysmal Atrial Fibrillation     Fibromyalgia     Nausea     Abdominal Pain     Peptic Ulcer     COPD exacerbation (H)     Mixed hyperlipidemia     Chronic Reflux Esophagitis     Benign Adenomatous Polyp Of The Large Intestine     Vertigo     Rotator cuff tendonitis     Generalized osteoarthrosis, involving multiple sites     Tendonitis of wrist, left     Trigger point of thoracic region     Flashers or floaters of right eye     Menopause     Tendonitis of wrist, right     Skin lesion of face     Hematoma of abdominal wall     Headache     Cervical neck pain with evidence of disc disease     Controlled substance agreement signed     Aspiration pneumonia (H)     Type 2 diabetes mellitus with hypoglycemia (H)     Candidal intertrigo     Osteoarthritis, hip, bilateral     Primary osteoarthritis of left knee     Osteoarthritis of both knees     Syncope     Opacity of lung on imaging study     Acute gastritis     Chronic obstructive pulmonary disease with acute lower respiratory infection (H)     Gastroenteritis     Nonrheumatic aortic valve stenosis     COPD (chronic obstructive pulmonary disease) (H)     Bunion, left     Callus of foot     Cataract     Chronic anemia     Anemia due to blood loss, acute     Diabetic polyneuropathy associated with type 2 diabetes mellitus (H)     Upper GI bleed     Melena     Dyslipidemia     Skin lesion     Mixed stress and urge urinary incontinence     Primary osteoarthritis of both knees     Advanced directives, counseling/discussion     Chronic gastric ulcer without hemorrhage and without perforation        Past Medical History:   Diagnosis Date     Anemia      Aortic stenosis      Atrial fibrillation (H)      Atrial flutter (H)      Benign neoplasm of adenomatous polyp     large intestine       "Chronic constipation      Chronic pain syndrome      COPD (chronic obstructive pulmonary disease) (H)     Oxygen at night      Dependence on supplemental oxygen     Oxygen at noc, during the day as needed     Depression      Diabetes mellitus (H)      Dry eye syndrome      Fibromyalgia      Ganglion     left wrist     GERD (gastroesophageal reflux disease)      Hyperlipidemia      Hypertension      Hypokalemia      Infective otitis externa, unspecified     Created by Conversion      Larynx edema      Lung disease      Malignant neoplasm of vulva (H)     Created by Conversion [facility] Annotation: Apr 17 2007  8:24AM - Cammy Bui:  resection per Dr. Alfonso Mane 9/06;  Replacement Utility updated for latest IMO load     Medial epicondylitis      Onychomycosis      Osteoarthritis      Peptic ulcer      Polyneuropathy      Vulvar malignant neoplasm (H)         Current Outpatient Medications   Medication     oxyCODONE (ROXICODONE) 10 MG tablet     ACCU-CHEK SOFTCLIX LANCETS lancets     albuterol (PROAIR HFA;PROVENTIL HFA;VENTOLIN HFA) 90 mcg/actuation inhaler     atorvastatin (LIPITOR) 20 MG tablet     BD ULTRA-FINE SHORT PEN NEEDLE 31 gauge x 5/16\" Ndle     blood-glucose meter (ONETOUCH VERIO IQ METER) Misc     budesonide-formoteroL (SYMBICORT) 160-4.5 mcg/actuation inhaler     cyclobenzaprine (FLEXERIL) 10 MG tablet     diaper,brief,adult,disposable (ADULT BRIEFS - LARGE) Misc     diltiazem ER COATED BEADS (CARDIZEM CD/CARTIA XT) 120 MG 24 hr capsule     furosemide (LASIX) 20 MG tablet     gabapentin (NEURONTIN) 600 MG tablet     generic lancets (FINGERSTIX LANCETS)     meclizine (ANTIVERT) 25 MG tablet     metFORMIN (GLUCOPHAGE) 500 MG tablet     mometasone-formoterol (DULERA) 200-5 mcg/actuation HFAA inhaler     nystatin (NYSTOP) powder     omeprazole (PRILOSEC) 20 MG capsule     ONETOUCH VERIO TEST STRIPS strips     sucralfate (CARAFATE) 1 gram tablet     warfarin ANTICOAGULANT (COUMADIN/JANTOVEN) " 5 MG tablet     No current facility-administered medications for this visit.        Past Surgical History:   Procedure Laterality Date     BIOPSY BREAST Right      BIOPSY BREAST Right 01/28/2015     BIOPSY BREAST Right 1/28/2015    Procedure: RIGHT BREAST BIOPSY AFTER WIRE LOCALIZATION AT 0940;  Surgeon: Renée Soriano MD;  Location: [facility] [place];  Service:      BIOPSY OF BREAST, INCISIONAL      Description: Incisional Breast Biopsy;  Recorded: 11/13/2007;  Comments: benign     C SUPRACERV ABD HYSTERECTOMY      Description: Supracervical Hysterectomy;  Proc Date: 01/01/1985;  Comments: some cervix left!; ovaries intact; done for bleeding     COLONOSCOPY N/A 6/14/2019    Procedure: COLONOSCOPY;  Surgeon: Eduardo Mora MD;  Location: [facility] [place];  Service: Gastroenterology     ESOPHAGOSCOPY, GASTROSCOPY, DUODENOSCOPY (EGD), COMBINED N/A 11/6/2018    Procedure: ESOPHAGOGASTRODUODENOSCOPY;  Surgeon: Lit Fernando MD;  Location: [facility] [place];  Service:      HYSTERECTOMY       JOINT REPLACEMENT Left     TKA     MS ABLATE HEART DYSRHYTHM FOCUS      Description: Catheter Ablation Atrial Fibrillation;  Recorded: 07/31/2012;  Comments: 7/24/12 PVI with Dr. Gardiner and nilay to all 5 pulm veins and CTI fl ablation line as well.        Social History     Socioeconomic History     Marital status:      Spouse name: Not on file     Number of children: Not on file     Years of education: Not on file     Highest education level: Not on file   Occupational History     Not on file   Tobacco Use     Smoking status: Current Every Day Smoker     Packs/day: 0.50     Types: Cigarettes     Smokeless tobacco: Never Used   Substance and Sexual Activity     Alcohol use: Yes     Comment: Alcoholic Drinks/day: very little     Drug use: No     Sexual activity: Not on file   Other Topics Concern     Not on file   Social History Narrative     Not on file     Social Determinants of Health     Financial Resource  Strain:      Difficulty of Paying Living Expenses:    Food Insecurity:      Worried About Running Out of Food in the Last Year:      Ran Out of Food in the Last Year:    Transportation Needs:      Lack of Transportation (Medical):      Lack of Transportation (Non-Medical):    Physical Activity:      Days of Exercise per Week:      Minutes of Exercise per Session:    Stress:      Feeling of Stress :    Social Connections:      Frequency of Communication with Friends and Family:      Frequency of Social Gatherings with Friends and Family:      Attends Protestant Services:      Active Member of Clubs or Organizations:      Attends Club or Organization Meetings:      Marital Status:    Intimate Partner Violence:      Fear of Current or Ex-Partner:      Emotionally Abused:      Physically Abused:      Sexually Abused:         Family History   Problem Relation Age of Onset     Heart Failure Mother      Cancer Other         paternal HX-laryngeal      Alcoholism Sister      No Known Problems Daughter      No Known Problems Maternal Grandmother      No Known Problems Maternal Grandfather      No Known Problems Paternal Grandmother      No Known Problems Paternal Grandfather      No Known Problems Maternal Aunt      No Known Problems Paternal Aunt      Alcoholism Sister      Alcoholism Brother      Alcoholism Father      Cancer Paternal Uncle         Gastric-Alcohol     Cancer Paternal Uncle         gastric-Alcohol     Hereditary Breast and Ovarian Cancer Syndrome No family hx of      Breast Cancer No family hx of      Colon Cancer No family hx of      Endometrial Cancer No family hx of      Ovarian Cancer No family hx of         Review of Systems   Gastrointestinal: Negative for blood in stool.   Musculoskeletal: Positive for back pain and myalgias.   Hematological:        No spontaneous bleeding   All other systems reviewed and are negative.       /64   Pulse 70   Temp 98.3  F (36.8  C)   Wt 70.2 kg (154 lb 11.2 oz)    SpO2 96%   BMI 26.55 kg/m       Physical Exam  Constitutional:       General: She is not in acute distress.     Appearance: She is well-developed.   HENT:      Right Ear: Tympanic membrane and external ear normal.      Left Ear: Tympanic membrane and external ear normal.      Nose: Nose normal.      Mouth/Throat:      Pharynx: No oropharyngeal exudate.   Eyes:      General:         Right eye: No discharge.         Left eye: No discharge.      Conjunctiva/sclera: Conjunctivae normal.      Pupils: Pupils are equal, round, and reactive to light.   Neck:      Thyroid: No thyromegaly.      Trachea: No tracheal deviation.   Cardiovascular:      Rate and Rhythm: Normal rate and regular rhythm.      Pulses: Normal pulses.      Heart sounds: Normal heart sounds, S1 normal and S2 normal. No murmur heard.   No friction rub. No S3 or S4 sounds.    Pulmonary:      Effort: Pulmonary effort is normal. No respiratory distress.      Breath sounds: Normal breath sounds. No wheezing or rales.   Abdominal:      General: Bowel sounds are normal.      Palpations: Abdomen is soft. There is no mass.      Tenderness: There is no abdominal tenderness.   Musculoskeletal:         General: Deformity (joint deformity ) present. Normal range of motion.      Cervical back: Neck supple.   Lymphadenopathy:      Cervical: No cervical adenopathy.   Skin:     General: Skin is warm and dry.      Findings: No rash.   Neurological:      Mental Status: She is alert and oriented to person, place, and time.      Motor: No abnormal muscle tone.      Deep Tendon Reflexes: Reflexes are normal and symmetric.   Psychiatric:         Thought Content: Thought content normal.         Judgment: Judgment normal.        Procedure Note - Trigger Point Injections    Consent obtained: verbal      Indication:  fibromyalgia    6 trigger points identified.  Each trigger point swabbed x 3 with Betadine swab.  Each trigger point then injected with 1.6 ml of 1% Lidocaine (no  "epi).    Total volume injected:  10 ml    Complications:  None, patient tolerated procedure well.    Plan:  Discussed care with patient.  RTC for further injections in 30 days prn.        Results:  Results for orders placed or performed in visit on 08/23/21   INR point of care     Status: Abnormal   Result Value Ref Range    INR 2.4 (H) 0.9 - 1.1    Narrative    This test is intended for monitoring Coumadin therapy. Results are not accurate in patients with prolonged INR due to factor deficiency.       Medications at Conclusion of Visit:  Current Outpatient Medications   Medication Sig Dispense Refill     oxyCODONE (ROXICODONE) 10 MG tablet Take 1 tablet (10 mg) by mouth every 6 hours as needed for moderate to severe pain 120 tablet 0     ACCU-CHEK SOFTCLIX LANCETS lancets [ACCU-CHEK SOFTCLIX LANCETS LANCETS] TEST THREE TIMES DAILY 300 each 3     albuterol (PROAIR HFA;PROVENTIL HFA;VENTOLIN HFA) 90 mcg/actuation inhaler [ALBUTEROL (PROAIR HFA;PROVENTIL HFA;VENTOLIN HFA) 90 MCG/ACTUATION INHALER] INHALE 2 PUFFS EVERY 4 HOURS AS NEEDED WHEEZING 90 g 11     atorvastatin (LIPITOR) 20 MG tablet [ATORVASTATIN (LIPITOR) 20 MG TABLET] TAKE 1 TABLET(20 MG) BY MOUTH AT BEDTIME 90 tablet 3     BD ULTRA-FINE SHORT PEN NEEDLE 31 gauge x 5/16\" Ndle [BD ULTRA-FINE SHORT PEN NEEDLE 31 GAUGE X 5/16\" NDLE] TEST FOUR TIMES DAILY WITH MEALS AND AT BEDTIME 400 each 3     blood-glucose meter (ONETOUCH VERIO IQ METER) Misc [BLOOD-GLUCOSE METER (ONETOUCH VERIO IQ METER) MISC] Check blood sugar three times a day. 1 each 0     budesonide-formoteroL (SYMBICORT) 160-4.5 mcg/actuation inhaler [BUDESONIDE-FORMOTEROL (SYMBICORT) 160-4.5 MCG/ACTUATION INHALER] Inhale 2 puffs 2 (two) times a day. Inhale 2 puff by mouth twice daily 1 Inhaler 5     cyclobenzaprine (FLEXERIL) 10 MG tablet Take 1 tablet (10 mg) by mouth nightly as needed for muscle spasms 30 tablet 0     diaper,brief,adult,disposable (ADULT BRIEFS - LARGE) Misc " [DIAPER,BRIEF,ADULT,DISPOSABLE (ADULT BRIEFS - LARGE) MISC] Use 3-4 daily as needed for incontinence 120 each 6     diltiazem ER COATED BEADS (CARDIZEM CD/CARTIA XT) 120 MG 24 hr capsule Take 1 capsule (120 mg) by mouth daily 90 capsule 1     furosemide (LASIX) 20 MG tablet Take 1 tablet (20 mg) by mouth daily as needed 90 tablet 3     gabapentin (NEURONTIN) 600 MG tablet [GABAPENTIN (NEURONTIN) 600 MG TABLET] TAKE 1 TABLET(600 MG) BY MOUTH THREE TIMES DAILY 270 tablet 3     generic lancets (FINGERSTIX LANCETS) [GENERIC LANCETS (FINGERSTIX LANCETS)] Dispense brand per patient's insurance at pharmacy discretion. 300 each 0     meclizine (ANTIVERT) 25 MG tablet Take 1 tablet (25 mg) by mouth 3 times daily as needed for dizziness or nausea 45 tablet 5     metFORMIN (GLUCOPHAGE) 500 MG tablet [METFORMIN (GLUCOPHAGE) 500 MG TABLET] TAKE 1 TABLET(500 MG) BY MOUTH TWICE DAILY WITH MEALS 180 tablet 3     mometasone-formoterol (DULERA) 200-5 mcg/actuation HFAA inhaler [MOMETASONE-FORMOTEROL (DULERA) 200-5 MCG/ACTUATION HFAA INHALER] Inhale 2 puffs 2 (two) times a day. 1 Inhaler 5     nystatin (NYSTOP) powder [NYSTATIN (NYSTOP) POWDER] Apply 1 application topically 2 (two) times a day as needed. 2-3 times to affected area(s). 60 g 3     omeprazole (PRILOSEC) 20 MG capsule [OMEPRAZOLE (PRILOSEC) 20 MG CAPSULE] TAKE 1 CAPSULE(20 MG) BY MOUTH DAILY BEFORE BREAKFAST 30 capsule 3     ONETOUCH VERIO TEST STRIPS strips [ONETOUCH VERIO TEST STRIPS STRIPS] USE 1 EACH AS DIRECTED THREE TIMES DAILY AS NEEDED 300 strip 3     sucralfate (CARAFATE) 1 gram tablet [SUCRALFATE (CARAFATE) 1 GRAM TABLET] TAKE 1 TABLET BY MOUTH FOUR TIMES DAILY(CRUSH TABLET AND MIX IT WITH A LITTLE WATER, THEN SWALLOW) 120 tablet 12     warfarin ANTICOAGULANT (COUMADIN/JANTOVEN) 5 MG tablet [WARFARIN ANTICOAGULANT (COUMADIN/JANTOVEN) 5 MG TABLET] Take 1 tablet (5 mg) daily as directed.  Adjust dose per INR results. 90 tablet 1         SAVANA ARAMBULA  MD NADEEM

## 2021-08-23 NOTE — PROGRESS NOTES
ANTICOAGULATION MANAGEMENT     Mary Kay Tejada 71 year old female is on warfarin with therapeutic INR result. (Goal INR 2.0-3.0)    Recent labs: (last 7 days)     08/23/21  0856   INR 2.4*       ASSESSMENT     Source(s): Chart Review, Patient/Caregiver Call and Template       Warfarin doses taken: Warfarin taken as instructed    Diet: No new diet changes identified    New illness, injury, or hospitalization: No    Medication/supplement changes: None noted    Signs or symptoms of bleeding or clotting: No    Previous INR: Therapeutic last 2(+) visits    Additional findings: None     PLAN     Recommended plan for no diet, medication or health factor changes affecting INR     Dosing Instructions: Continue your current warfarin dose with next INR in 3 weeks       Summary  As of 8/23/2021    Full warfarin instructions:  2.5 mg every Sat; 5 mg all other days   Next INR check:  9/13/2021             Telephone call with Mary Kay who verbalizes understanding and agrees to plan    Lab visit scheduled  She is coming 9/9 for urine and will get INR done at the same time.     Education provided: Target INR goal and significance of current INR result, Importance of therapeutic range and Importance of following up for INR monitoring at instructed interval    Plan made per [facility] anticoagulation protocol    Mena Dumont RN  Anticoagulation Clinic  8/23/2021    _______________________________________________________________________     Anticoagulation Episode Summary     Current INR goal:  2.0-3.0   TTR:  53.6 % (1 y)   Target end date:  Indefinite   Send INR reminders to:  [facility]    Indications    Paroxysmal Atrial Fibrillation [I48.91]           Comments:           Anticoagulation Care Providers     Provider Role Specialty Phone number    Cammy Bui MD Referring Family Medicine [phone]

## 2021-08-25 RX ORDER — FUROSEMIDE 20 MG
20 TABLET ORAL DAILY PRN
Qty: 90 TABLET | Refills: 3 | Status: SHIPPED | OUTPATIENT
Start: 2021-08-25 | End: 2022-08-22

## 2021-08-25 NOTE — TELEPHONE ENCOUNTER
"Last Written Prescription Date:  8/5/20  Last Fill Quantity: 90,  # refills: 3   Last office visit provider:  8/23/21     Requested Prescriptions   Pending Prescriptions Disp Refills     furosemide (LASIX) 20 MG tablet [Pharmacy Med Name: FUROSEMIDE 20MG TABLETS] 90 tablet 3     Sig: TAKE 1 TABLET(20 MG) BY MOUTH DAILY AS NEEDED       Diuretics (Including Combos) Protocol Passed - 8/21/2021  7:15 AM        Passed - Blood pressure under 140/90 in past 12 months     BP Readings from Last 3 Encounters:   08/23/21 108/64   07/21/21 105/63   06/23/21 122/58                 Passed - Recent (12 mo) or future (30 days) visit within the authorizing provider's specialty     Patient has had an office visit with the authorizing provider or a provider within the authorizing providers department within the previous 12 mos or has a future within next 30 days. See \"Patient Info\" tab in inbasket, or \"Choose Columns\" in Meds & Orders section of the refill encounter.              Passed - Medication is active on med list        Passed - Patient is age 18 or older        Passed - No active pregancy on record        Passed - Normal serum creatinine on file in past 12 months     Recent Labs   Lab Test 07/21/21  0911   CR 0.66              Passed - Normal serum potassium on file in past 12 months     Recent Labs   Lab Test 07/21/21  0911   POTASSIUM 4.2                    Passed - Normal serum sodium on file in past 12 months     Recent Labs   Lab Test 07/21/21  0911                 Passed - No positive pregnancy test in past 12 months             Quincy Dang RN 08/25/21 8:27 AM  "

## 2021-08-28 ASSESSMENT — ENCOUNTER SYMPTOMS
BLOOD IN STOOL: 0
MYALGIAS: 1
BACK PAIN: 1

## 2021-09-07 DIAGNOSIS — K27.9 PEPTIC ULCER: ICD-10-CM

## 2021-09-09 ENCOUNTER — LAB (OUTPATIENT)
Dept: LAB | Facility: CLINIC | Age: 71
End: 2021-09-09
Payer: COMMERCIAL

## 2021-09-09 DIAGNOSIS — M79.7 FIBROMYALGIA: ICD-10-CM

## 2021-09-09 DIAGNOSIS — G89.4 CHRONIC PAIN SYNDROME: ICD-10-CM

## 2021-09-09 LAB
AMPHETAMINES UR QL SCN: ABNORMAL
BARBITURATES UR QL: ABNORMAL
BENZODIAZ UR QL: ABNORMAL
CANNABINOIDS UR QL SCN: ABNORMAL
COCAINE UR QL: ABNORMAL
CREAT UR-MCNC: 250 MG/DL
OPIATES UR QL SCN: ABNORMAL
OXYCODONE UR QL: ABNORMAL
PCP UR QL SCN: ABNORMAL

## 2021-09-09 PROCEDURE — 80307 DRUG TEST PRSMV CHEM ANLYZR: CPT

## 2021-09-15 ENCOUNTER — PATIENT OUTREACH (OUTPATIENT)
Dept: GERIATRIC MEDICINE | Facility: CLINIC | Age: 71
End: 2021-09-15

## 2021-09-15 ASSESSMENT — ACTIVITIES OF DAILY LIVING (ADL): DEPENDENT_IADLS:: CLEANING;COOKING;LAUNDRY;SHOPPING;MEAL PREPARATION;TRANSPORTATION

## 2021-09-15 NOTE — PROGRESS NOTES
[facility] Care Coordination Contact    [facility] Home Visit Assessment     Home visit for Health Risk Assessment with Mary Kay Tejada completed on September 15, 2021    Type of residence:: Private home - no stairs  Current living arrangement:: I live in a private home with family     Assessment completed with:: Patient, Care Team Member    Current Care Plan  Member currently receiving the following home care services:     Member currently receiving the following community resources: County Worker, [employer], Housekeeping/Chore Agency, PCA      Medication Review  Medication reconciliation completed in Epic: Yes  Medication set-up & administration: Independent and sets up on own weekly.  Self-administers medications.  Medication Risk Assessment Medication (1 or more, place referral to MTM): N/A: No risk factors identified  MTM Referral Placed: No: No risk factors idenified    Mental/Behavioral Health   Depression Screening:   PHQ-2 Total Score (Adult) - Positive if 3 or more points; Administer PHQ-9 if positive: 1  Member reported this is related to chronic pain but remains positive and doing fine. She is aware of the available mental health benefits if needed.        Mental health DX:: Yes   Mental health DX how managed:: None    Falls Assessment:   Fallen 2 or more times in the past year?: No   Any fall with injury in the past year?: No    ADL/IADL Dependencies:   Dependent ADLs:: Bathing, Dressing  Dependent IADLs:: Cleaning, Cooking, Laundry, Shopping, Meal Preparation, Transportation    [facility] [employer] sponsored benefits: Shared information re: Silver Sneakers/gym memberships, ASA, Calcium +D.    PCA Assessment completed at visit: Yes     Elderly Waiver Eligibility: Yes-will continue on EW    Care Plan & Recommendations: member will continue to reside at home with personal care attendant and homemaking services. We will continue the life alert. Member is due for an eye exam. She is seeing PCP on  9/22 and will get a referral to see the ophthalmologist.      See [facility] for detailed assessment information.    Follow-Up Plan: Member informed of future contact, plan to f/u with member with a 6 month telephone assessment.  Contact information shared with member and family, encouraged member to call with any questions or concerns at any time.    Samantha Alexandra RN, N   [facility]  [phone]

## 2021-09-24 ENCOUNTER — ANTICOAGULATION THERAPY VISIT (OUTPATIENT)
Dept: ANTICOAGULATION | Facility: CLINIC | Age: 71
End: 2021-09-24

## 2021-09-24 ENCOUNTER — OFFICE VISIT (OUTPATIENT)
Dept: FAMILY MEDICINE | Facility: CLINIC | Age: 71
End: 2021-09-24
Payer: COMMERCIAL

## 2021-09-24 ENCOUNTER — PATIENT OUTREACH (OUTPATIENT)
Dept: GERIATRIC MEDICINE | Facility: CLINIC | Age: 71
End: 2021-09-24

## 2021-09-24 ENCOUNTER — NURSE TRIAGE (OUTPATIENT)
Dept: NURSING | Facility: CLINIC | Age: 71
End: 2021-09-24

## 2021-09-24 ENCOUNTER — TELEPHONE (OUTPATIENT)
Dept: ANTICOAGULATION | Facility: CLINIC | Age: 71
End: 2021-09-24

## 2021-09-24 VITALS
WEIGHT: 147 LBS | RESPIRATION RATE: 20 BRPM | SYSTOLIC BLOOD PRESSURE: 121 MMHG | DIASTOLIC BLOOD PRESSURE: 60 MMHG | BODY MASS INDEX: 25.23 KG/M2 | TEMPERATURE: 98.4 F | HEART RATE: 59 BPM

## 2021-09-24 DIAGNOSIS — M79.7 FIBROMYALGIA: ICD-10-CM

## 2021-09-24 DIAGNOSIS — I48.91 ATRIAL FIBRILLATION (H): Primary | ICD-10-CM

## 2021-09-24 DIAGNOSIS — G89.4 CHRONIC PAIN SYNDROME: ICD-10-CM

## 2021-09-24 DIAGNOSIS — E11.649 TYPE 2 DIABETES MELLITUS WITH HYPOGLYCEMIA WITHOUT COMA, WITHOUT LONG-TERM CURRENT USE OF INSULIN (H): ICD-10-CM

## 2021-09-24 DIAGNOSIS — I48.91 ATRIAL FIBRILLATION, UNSPECIFIED TYPE (H): Primary | ICD-10-CM

## 2021-09-24 LAB — INR PPP: 5.48 (ref 0.85–1.15)

## 2021-09-24 PROCEDURE — 99213 OFFICE O/P EST LOW 20 MIN: CPT | Performed by: FAMILY MEDICINE

## 2021-09-24 PROCEDURE — 36415 COLL VENOUS BLD VENIPUNCTURE: CPT | Performed by: FAMILY MEDICINE

## 2021-09-24 PROCEDURE — 85610 PROTHROMBIN TIME: CPT | Performed by: FAMILY MEDICINE

## 2021-09-24 RX ORDER — OXYCODONE HYDROCHLORIDE 10 MG/1
10 TABLET ORAL EVERY 6 HOURS PRN
Qty: 120 TABLET | Refills: 0 | Status: SHIPPED | OUTPATIENT
Start: 2021-09-24 | End: 2021-10-25

## 2021-09-24 NOTE — TELEPHONE ENCOUNTER
"ANTICOAGULATION  MANAGEMENT    Mary Kay Tejada is on warfarin and had a point of care INR result > 5.5 or an \"error message\" on point of care meter today.    Morning INR; STAT venous INR processing requested from lab    Mary Kay via phone while at the lab and reviewed triage questions for potential signs and symptoms of bleeding.      Patient Response     Have you had any bleeding in the last week?   NO   Have you passed any red, black, or tarry stools in last week?   NO   Have you vomited/spit up any red or coffee ground material in last week?   NO   Have you had any new, severe abdominal pain or bloating develop in last week?   NO   Have you fallen or had any injuries in last week?   NO   Do you currently have a severe, sudden onset headache?   NO   Do you currently have any severe sudden changes in your vision?   NO   Do you currently have any new onset numbness, weakness/paralysis?   NO     Assessment/Plan:     Mary Kay's responses were negative for signs and symptoms of bleeding; may discharge from clinic    Mary Kay instructed to:       Hold warfarin today until venous INR result returns and receives follow up call from M    Seek medical attention for new signs and symptoms of bleeding or a fall with injury.         "

## 2021-09-24 NOTE — TELEPHONE ENCOUNTER
Britney, from [facility]'s lab is calling.    Critical lab value.   INR 5.48.    Pt was seen today. INR in clinic was >8 so was told to hold med all weekend and repeat INR on Monday.  Now INR per lab is 5.48.    6:12pm-Page to on call physician, Dr Indra Pedroza.  6:13-Call back from Dr Pedroza.  He is aware of results.  He stated to stick with the same plan.  Hold Coumadin x 3 all weekend and redraw INR on Monday. No change to plan.    6:18pm-Call to the patient. No answer. Message left on her machine to call us back and to speak to any available triage nurse.    (Want to relay lab results as she is waiting for the INR results from today.)    Reason for Disposition    Lab or radiology calling with CRITICAL test results    Additional Information    Negative: Lab calling with strep throat test results and triager can call in prescription    Negative: Lab calling with urinalysis test results and triager can call in prescription    Negative: Medication questions    Negative: ED call to PCP    Negative: Physician call to PCP    Negative: Call about patient who is currently hospitalized    Protocols used: PCP CALL - NO TRIAGE-A-    Keyanna Cason RN  [facility] Nurse Advisor  9/24/2021 at 6:21 PM

## 2021-09-24 NOTE — LETTER
[address], [address]  [place] [address]  Phone:  [phone]  Fax:  [phone]      September 24, 2021    MARY KAY HINDS  [address] [place] [id number]    Dear Mary Kay,    Kev is a copy of your completed PCA Assessment and Service Plan.  This is for your records and no action is required by you.  If you have additional questions regarding your assessment please contact me at  . If you feel that your needs are not being met, please contact the Clinical Supervisor at [phone].    Sincerely,    Samantha Alexandra RN, PHN  [phone]  [email or web address]            Enclosure:  Completed PCA assessment

## 2021-09-24 NOTE — PROGRESS NOTES
"Assessment & Plan    1. Atrial fibrillation, unspecified type (H)  INR is way too high - no clear explanation or change   - INR; Future  - INR    2. Chronic pain syndrome  Patient with chronic pain syndrome - uses Oxycodone - has not increased use - due for refill  - oxyCODONE IR (ROXICODONE) 10 MG tablet; Take 1 tablet (10 mg) by mouth every 6 hours as needed for moderate to severe pain  Dispense: 120 tablet; Refill: 0    3. Fibromyalgia  Source of chronic pain - usually improves with trigger point injections - can't do this today in light of elevated INR  - oxyCODONE IR (ROXICODONE) 10 MG tablet; Take 1 tablet (10 mg) by mouth every 6 hours as needed for moderate to severe pain  Dispense: 120 tablet; Refill: 0    4. Type 2 diabetes mellitus with hypoglycemia without coma, without long-term current use of insulin (H)  Patient in need for eye exam - will refer  - Adult Eye Referral; Future         Patient has elevated INR today - can't do trigger point injections - will need INR early next week - could work her in for injections with that appointment (OK to double book with me).      No follow-ups on file.    Chief Complaint   Patient presents with     Knee Pain     left      HPI  INR is > 8  No bruising    [place] with sister  Twisted left knee  - over weekend -   \"it's a lot better than previous\"  Has been using walker and brace           Patient Active Problem List   Diagnosis     Abnormal Weight Loss     Osteoarthritis Of The Shoulder     Chronic Constipation     Onychomycosis Of The Toenails     Diarrhea     Ganglion Of The Left Wrist     Larynx Edema     Obesity     Medial Epicondylitis     Skin Lesion     Urinary Incontinence     Chronic Major Depression     Dry Eye Syndrome     Nicotine Dependence     Hypokalemia     Essential hypertension     Muscle Cramps In The Calf     Edema     Atrial flutter (H)     Lump In / On The Skin     Chronic pain syndrome     Paroxysmal Atrial Fibrillation     " Fibromyalgia     Nausea     Abdominal Pain     Peptic Ulcer     COPD exacerbation (H)     Mixed hyperlipidemia     Chronic Reflux Esophagitis     Benign Adenomatous Polyp Of The Large Intestine     Vertigo     Rotator cuff tendonitis     Generalized osteoarthrosis, involving multiple sites     Tendonitis of wrist, left     Trigger point of thoracic region     Flashers or floaters of right eye     Menopause     Tendonitis of wrist, right     Skin lesion of face     Hematoma of abdominal wall     Headache     Cervical neck pain with evidence of disc disease     Controlled substance agreement signed     Aspiration pneumonia (H)     Type 2 diabetes mellitus with hypoglycemia (H)     Candidal intertrigo     Osteoarthritis, hip, bilateral     Primary osteoarthritis of left knee     Osteoarthritis of both knees     Syncope     Opacity of lung on imaging study     Acute gastritis     Chronic obstructive pulmonary disease with acute lower respiratory infection (H)     Gastroenteritis     Nonrheumatic aortic valve stenosis     COPD (chronic obstructive pulmonary disease) (H)     Bunion, left     Callus of foot     Cataract     Chronic anemia     Anemia due to blood loss, acute     Diabetic polyneuropathy associated with type 2 diabetes mellitus (H)     Upper GI bleed     Melena     Dyslipidemia     Skin lesion     Mixed stress and urge urinary incontinence     Primary osteoarthritis of both knees     Advanced directives, counseling/discussion     Chronic gastric ulcer without hemorrhage and without perforation        Past Medical History:   Diagnosis Date     Anemia      Aortic stenosis      Atrial fibrillation (H)      Atrial flutter (H)      Benign neoplasm of adenomatous polyp     large intestine      Chronic constipation      Chronic pain syndrome      COPD (chronic obstructive pulmonary disease) (H)     Oxygen at night      Dependence on supplemental oxygen     Oxygen at noc, during the day as needed     Depression       "Diabetes mellitus (H)      Dry eye syndrome      Fibromyalgia      Ganglion     left wrist     GERD (gastroesophageal reflux disease)      Hyperlipidemia      Hypertension      Hypokalemia      Infective otitis externa, unspecified     Created by Conversion      Larynx edema      Lung disease      Malignant neoplasm of vulva (H)     Created by Conversion [facility] Annotation: Apr 17 2007  8:24AM - Cammy Bui:  resection per Dr. Alfonso Mane 9/06;  Replacement Utility updated for latest IMO load     Medial epicondylitis      Onychomycosis      Osteoarthritis      Peptic ulcer      Polyneuropathy      Vulvar malignant neoplasm (H)         Current Outpatient Medications   Medication     ACCU-CHEK SOFTCLIX LANCETS lancets     albuterol (PROAIR HFA;PROVENTIL HFA;VENTOLIN HFA) 90 mcg/actuation inhaler     atorvastatin (LIPITOR) 20 MG tablet     BD ULTRA-FINE SHORT PEN NEEDLE 31 gauge x 5/16\" Ndle     blood-glucose meter (ONETOUCH VERIO IQ METER) Misc     budesonide-formoteroL (SYMBICORT) 160-4.5 mcg/actuation inhaler     cyclobenzaprine (FLEXERIL) 10 MG tablet     diaper,brief,adult,disposable (ADULT BRIEFS - LARGE) Misc     diltiazem ER COATED BEADS (CARDIZEM CD/CARTIA XT) 120 MG 24 hr capsule     furosemide (LASIX) 20 MG tablet     gabapentin (NEURONTIN) 600 MG tablet     generic lancets (FINGERSTIX LANCETS)     meclizine (ANTIVERT) 25 MG tablet     metFORMIN (GLUCOPHAGE) 500 MG tablet     mometasone-formoterol (DULERA) 200-5 mcg/actuation HFAA inhaler     nystatin (NYSTOP) powder     omeprazole (PRILOSEC) 20 MG DR capsule     ONETOUCH VERIO TEST STRIPS strips     oxyCODONE (ROXICODONE) 10 MG tablet     warfarin ANTICOAGULANT (COUMADIN/JANTOVEN) 5 MG tablet     sucralfate (CARAFATE) 1 gram tablet     No current facility-administered medications for this visit.        Past Surgical History:   Procedure Laterality Date     BIOPSY BREAST Right      BIOPSY BREAST Right 01/28/2015     BIOPSY BREAST Right " 1/28/2015    Procedure: RIGHT BREAST BIOPSY AFTER WIRE LOCALIZATION AT 0940;  Surgeon: Renée Soriaon MD;  Location: [facility];  Service:      BIOPSY OF BREAST, INCISIONAL      Description: Incisional Breast Biopsy;  Recorded: 11/13/2007;  Comments: benign     C SUPRACERV ABD HYSTERECTOMY      Description: Supracervical Hysterectomy;  Proc Date: 01/01/1985;  Comments: some cervix left!; ovaries intact; done for bleeding     COLONOSCOPY N/A 6/14/2019    Procedure: COLONOSCOPY;  Surgeon: dEuardo Mora MD;  Location: [facility];  Service: Gastroenterology     ESOPHAGOSCOPY, GASTROSCOPY, DUODENOSCOPY (EGD), COMBINED N/A 11/6/2018    Procedure: ESOPHAGOGASTRODUODENOSCOPY;  Surgeon: Lit Fernando MD;  Location: [facility];  Service:      HYSTERECTOMY       JOINT REPLACEMENT Left     TKA     GA ABLATE HEART DYSRHYTHM FOCUS      Description: Catheter Ablation Atrial Fibrillation;  Recorded: 07/31/2012;  Comments: 7/24/12 PVI with Dr. Gardiner and nilay to all 5 pulm veins and CTI fl ablation line as well.        Social History     Socioeconomic History     Marital status:      Spouse name: Not on file     Number of children: Not on file     Years of education: Not on file     Highest education level: Not on file   Occupational History     Not on file   Tobacco Use     Smoking status: Current Every Day Smoker     Packs/day: 0.50     Types: Cigarettes     Smokeless tobacco: Never Used   Substance and Sexual Activity     Alcohol use: Yes     Comment: Alcoholic Drinks/day: very little     Drug use: No     Sexual activity: Not on file   Other Topics Concern     Not on file   Social History Narrative     Not on file     Social Determinants of Health     Financial Resource Strain:      Difficulty of Paying Living Expenses:    Food Insecurity:      Worried About Running Out of Food in the Last Year:      Ran Out of Food in the Last Year:    Transportation Needs:      Lack of Transportation  (Medical):      Lack of Transportation (Non-Medical):    Physical Activity:      Days of Exercise per Week:      Minutes of Exercise per Session:    Stress:      Feeling of Stress :    Social Connections:      Frequency of Communication with Friends and Family:      Frequency of Social Gatherings with Friends and Family:      Attends [employer] Services:      Active Member of Clubs or Organizations:      Attends Club or Organization Meetings:      Marital Status:    Intimate Partner Violence:      Fear of Current or Ex-Partner:      Emotionally Abused:      Physically Abused:      Sexually Abused:         Family History   Problem Relation Age of Onset     Heart Failure Mother      Cancer Other         paternal HX-laryngeal      Alcoholism Sister      No Known Problems Daughter      No Known Problems Maternal Grandmother      No Known Problems Maternal Grandfather      No Known Problems Paternal Grandmother      No Known Problems Paternal Grandfather      No Known Problems Maternal Aunt      No Known Problems Paternal Aunt      Alcoholism Sister      Alcoholism Brother      Alcoholism Father      Cancer Paternal Uncle         Gastric-Alcohol     Cancer Paternal Uncle         gastric-Alcohol     Hereditary Breast and Ovarian Cancer Syndrome No family hx of      Breast Cancer No family hx of      Colon Cancer No family hx of      Endometrial Cancer No family hx of      Ovarian Cancer No family hx of         Review of Systems   Musculoskeletal: Positive for myalgias (fibromyalgia). Joint swelling: left knee joint swelling.   Hematological: Does not bruise/bleed easily.   Psychiatric/Behavioral: Negative for dysphoric mood.   All other systems reviewed and are negative.       /60 (BP Location: Left arm, Patient Position: Sitting, Cuff Size: Adult Regular)   Pulse 59   Temp 98.4  F (36.9  C) (Temporal)   Resp 20   Wt 66.7 kg (147 lb)   BMI 25.23 kg/m       Physical Exam  Constitutional:       General: She is not  in acute distress.     Appearance: She is well-developed.   HENT:      Right Ear: Tympanic membrane and external ear normal.      Left Ear: Tympanic membrane and external ear normal.      Nose: Nose normal.      Mouth/Throat:      Pharynx: No oropharyngeal exudate.   Eyes:      General:         Right eye: No discharge.         Left eye: No discharge.      Conjunctiva/sclera: Conjunctivae normal.      Pupils: Pupils are equal, round, and reactive to light.   Neck:      Thyroid: No thyromegaly.      Trachea: No tracheal deviation.   Cardiovascular:      Rate and Rhythm: Normal rate and regular rhythm.      Pulses: Normal pulses.      Heart sounds: Normal heart sounds, S1 normal and S2 normal. No murmur heard.   No friction rub. No S3 or S4 sounds.    Pulmonary:      Effort: Pulmonary effort is normal. No respiratory distress.      Breath sounds: Normal breath sounds. No wheezing or rales.   Abdominal:      General: Bowel sounds are normal.      Palpations: Abdomen is soft. There is no mass.      Tenderness: There is no abdominal tenderness.   Musculoskeletal:         General: No swelling (left knee with nl ROM). Normal range of motion.      Cervical back: Neck supple.   Lymphadenopathy:      Cervical: No cervical adenopathy.   Skin:     General: Skin is warm and dry.      Findings: No rash.   Neurological:      Mental Status: She is alert and oriented to person, place, and time.      Motor: No abnormal muscle tone.      Deep Tendon Reflexes: Reflexes are normal and symmetric.   Psychiatric:         Thought Content: Thought content normal.         Judgment: Judgment normal.          Results:  No results found for any visits on 09/24/21.    Medications at Conclusion of Visit:  Current Outpatient Medications   Medication Sig Dispense Refill     ACCU-CHEK SOFTCLIX LANCETS lancets [ACCU-CHEK SOFTCLIX LANCETS LANCETS] TEST THREE TIMES DAILY 300 each 3     albuterol (PROAIR HFA;PROVENTIL HFA;VENTOLIN HFA) 90 mcg/actuation  "inhaler [ALBUTEROL (PROAIR HFA;PROVENTIL HFA;VENTOLIN HFA) 90 MCG/ACTUATION INHALER] INHALE 2 PUFFS EVERY 4 HOURS AS NEEDED WHEEZING 90 g 11     atorvastatin (LIPITOR) 20 MG tablet [ATORVASTATIN (LIPITOR) 20 MG TABLET] TAKE 1 TABLET(20 MG) BY MOUTH AT BEDTIME 90 tablet 3     BD ULTRA-FINE SHORT PEN NEEDLE 31 gauge x 5/16\" Ndle [BD ULTRA-FINE SHORT PEN NEEDLE 31 GAUGE X 5/16\" NDLE] TEST FOUR TIMES DAILY WITH MEALS AND AT BEDTIME 400 each 3     blood-glucose meter (ONETOUCH VERIO IQ METER) Misc [BLOOD-GLUCOSE METER (ONETOUCH VERIO IQ METER) MISC] Check blood sugar three times a day. 1 each 0     budesonide-formoteroL (SYMBICORT) 160-4.5 mcg/actuation inhaler [BUDESONIDE-FORMOTEROL (SYMBICORT) 160-4.5 MCG/ACTUATION INHALER] Inhale 2 puffs 2 (two) times a day. Inhale 2 puff by mouth twice daily 1 Inhaler 5     cyclobenzaprine (FLEXERIL) 10 MG tablet Take 1 tablet (10 mg) by mouth nightly as needed for muscle spasms 30 tablet 0     diaper,brief,adult,disposable (ADULT BRIEFS - LARGE) Misc [DIAPER,BRIEF,ADULT,DISPOSABLE (ADULT BRIEFS - LARGE) MISC] Use 3-4 daily as needed for incontinence 120 each 6     diltiazem ER COATED BEADS (CARDIZEM CD/CARTIA XT) 120 MG 24 hr capsule Take 1 capsule (120 mg) by mouth daily 90 capsule 1     furosemide (LASIX) 20 MG tablet Take 1 tablet (20 mg) by mouth daily as needed 90 tablet 3     gabapentin (NEURONTIN) 600 MG tablet [GABAPENTIN (NEURONTIN) 600 MG TABLET] TAKE 1 TABLET(600 MG) BY MOUTH THREE TIMES DAILY 270 tablet 3     generic lancets (FINGERSTIX LANCETS) [GENERIC LANCETS (FINGERSTIX LANCETS)] Dispense brand per patient's insurance at pharmacy discretion. 300 each 0     meclizine (ANTIVERT) 25 MG tablet Take 1 tablet (25 mg) by mouth 3 times daily as needed for dizziness or nausea 45 tablet 5     metFORMIN (GLUCOPHAGE) 500 MG tablet [METFORMIN (GLUCOPHAGE) 500 MG TABLET] TAKE 1 TABLET(500 MG) BY MOUTH TWICE DAILY WITH MEALS 180 tablet 3     mometasone-formoterol (DULERA) 200-5 " mcg/actuation HFAA inhaler [MOMETASONE-FORMOTEROL (DULERA) 200-5 MCG/ACTUATION HFAA INHALER] Inhale 2 puffs 2 (two) times a day. 1 Inhaler 5     nystatin (NYSTOP) powder [NYSTATIN (NYSTOP) POWDER] Apply 1 application topically 2 (two) times a day as needed. 2-3 times to affected area(s). 60 g 3     omeprazole (PRILOSEC) 20 MG DR capsule TAKE 1 CAPSULE(20 MG) BY MOUTH DAILY BEFORE BREAKFAST 30 capsule 3     ONETOUCH VERIO TEST STRIPS strips [ONETOUCH VERIO TEST STRIPS STRIPS] USE 1 EACH AS DIRECTED THREE TIMES DAILY AS NEEDED 300 strip 3     oxyCODONE (ROXICODONE) 10 MG tablet Take 1 tablet (10 mg) by mouth every 6 hours as needed for moderate to severe pain 120 tablet 0     warfarin ANTICOAGULANT (COUMADIN/JANTOVEN) 5 MG tablet [WARFARIN ANTICOAGULANT (COUMADIN/JANTOVEN) 5 MG TABLET] Take 1 tablet (5 mg) daily as directed.  Adjust dose per INR results. 90 tablet 1     sucralfate (CARAFATE) 1 gram tablet [SUCRALFATE (CARAFATE) 1 GRAM TABLET] TAKE 1 TABLET BY MOUTH FOUR TIMES DAILY(CRUSH TABLET AND MIX IT WITH A LITTLE WATER, THEN SWALLOW) 120 tablet 12         SAVANA SILVER MD

## 2021-09-24 NOTE — PROGRESS NOTES
"POCT INR from today was >8.0-- venous sent and is still pending.   Discussed plan with Yue Dunbar, PharmD, who recommends hold x3 and recheck Monday.  Called and informed patient of this-- patient agreed to hold all weekend but said she wanted to wait for the result from today before coming in Monday and not be able to get her injection still.   ACN stressed the importance of a check Monday, and explained that an adjustment may be necessary still and that a new INR after the holds is necessary to determine this-- patient verbalized understanding and agreed to \"try to find a ride there on Monday\".     Reviewed s/sx of bleeding and when to seek care. Patient verbalized understanding and agreed to seek care if needed.       ACN to follow-up with patient after venous result returns/with new POCT result Monday.    Yolis Hdz RN  [facility]  [phone]    "

## 2021-09-24 NOTE — TELEPHONE ENCOUNTER
INR over 8 per [facility] lab. Patient does have an appointment to see Dr Brizuela now.

## 2021-09-27 ENCOUNTER — ANTICOAGULATION THERAPY VISIT (OUTPATIENT)
Dept: ANTICOAGULATION | Facility: CLINIC | Age: 71
End: 2021-09-27

## 2021-09-27 ENCOUNTER — TELEPHONE (OUTPATIENT)
Dept: ANTICOAGULATION | Facility: CLINIC | Age: 71
End: 2021-09-27

## 2021-09-27 ENCOUNTER — OFFICE VISIT (OUTPATIENT)
Dept: FAMILY MEDICINE | Facility: CLINIC | Age: 71
End: 2021-09-27
Payer: COMMERCIAL

## 2021-09-27 ENCOUNTER — LAB (OUTPATIENT)
Dept: LAB | Facility: CLINIC | Age: 71
End: 2021-09-27
Payer: COMMERCIAL

## 2021-09-27 VITALS
HEART RATE: 91 BPM | WEIGHT: 149 LBS | SYSTOLIC BLOOD PRESSURE: 133 MMHG | BODY MASS INDEX: 25.58 KG/M2 | TEMPERATURE: 97.1 F | DIASTOLIC BLOOD PRESSURE: 74 MMHG

## 2021-09-27 DIAGNOSIS — I48.91 ATRIAL FIBRILLATION (H): Primary | ICD-10-CM

## 2021-09-27 DIAGNOSIS — F33.1 MODERATE EPISODE OF RECURRENT MAJOR DEPRESSIVE DISORDER (H): ICD-10-CM

## 2021-09-27 DIAGNOSIS — I48.91 ATRIAL FIBRILLATION, UNSPECIFIED TYPE (H): ICD-10-CM

## 2021-09-27 DIAGNOSIS — M54.6 TRIGGER POINT OF THORACIC REGION: Primary | ICD-10-CM

## 2021-09-27 DIAGNOSIS — M79.7 FIBROMYALGIA: ICD-10-CM

## 2021-09-27 LAB — INR BLD: 2.7 (ref 0.9–1.1)

## 2021-09-27 PROCEDURE — 20553 NJX 1/MLT TRIGGER POINTS 3/>: CPT | Performed by: FAMILY MEDICINE

## 2021-09-27 PROCEDURE — 99212 OFFICE O/P EST SF 10 MIN: CPT | Mod: 25 | Performed by: FAMILY MEDICINE

## 2021-09-27 PROCEDURE — 85610 PROTHROMBIN TIME: CPT | Performed by: FAMILY MEDICINE

## 2021-09-27 NOTE — TELEPHONE ENCOUNTER
ANTICOAGULATION MANAGEMENT      Mary Kay Tejada due for annual renewal of referral to anticoagulation monitoring. Order pended for your review and signature.      ANTICOAGULATION SUMMARY      Warfarin indication(s)     Atrial fibrillation    Heart valve present?  NO       Current goal range   INR: 2.0-3.0     Goal appropriate for indication? Yes, INR 2-3 appropriate for hx of DVT, PE, hypercoagulable state, Afib, LVAD, or bileaflet AVR without risk factors     Current duration of therapy Indefinite/long term therapy   Time in Therapeutic Range (TTR)  (Goal > 60%) 55.4%       Office visit with referring provider's group within last year yes on 9/24       Yolis Hdz RN

## 2021-09-27 NOTE — PROGRESS NOTES
ANTICOAGULATION MANAGEMENT     Mary Kay Tejada 71 year old female is on warfarin with therapeutic INR result. (Goal INR 2.0-3.0)    Recent labs: (last 7 days)     09/27/21  0938   INR 2.7*       ASSESSMENT     Source(s): Chart Review and Patient/Caregiver Call       Warfarin doses taken: Warfarin recently held for supratherapeutic INR which may be affecting INR    Diet: No new diet changes identified    New illness, injury, or hospitalization: No    Medication/supplement changes: None noted    Signs or symptoms of bleeding or clotting: No    Previous INR: Therapeutic last 2(+) visits    Additional findings: None     PLAN     Recommended plan for no diet, medication or health factor changes affecting INR     Dosing Instructions: Continue your current warfarin dose with next INR in 1 week       Summary  As of 9/27/2021    Full warfarin instructions:  2.5 mg every Sat; 5 mg all other days   Next INR check:  10/4/2021             Telephone call with Mary Kay who verbalizes understanding and agrees to plan    Patient offered & declined to schedule next visit-- stated she would find a ride and get in next Monday to have her INR checked    Education provided: Goal range and significance of current result, Importance of following up at instructed interval and When to seek medical attention/emergency care    Plan made per [facility] anticoagulation protocol    Yolis Hdz RN  Anticoagulation Clinic  9/27/2021    _______________________________________________________________________     Anticoagulation Episode Summary     Current INR goal:  2.0-3.0   TTR:  55.4 % (1 y)   Target end date:  Indefinite   Send INR reminders to:  [employer]    Indications    Paroxysmal Atrial Fibrillation [I48.91]           Comments:           Anticoagulation Care Providers     Provider Role Specialty Phone number    Cammy Bui MD Referring Family Medicine [phone]

## 2021-09-27 NOTE — PROGRESS NOTES
Venous result did return over the weekend.  Patient was in today for INR which was therapeutic.   See that encounter for follow-up.    Yolis Hdz RN  [facility]  [phone]

## 2021-09-30 DIAGNOSIS — E11.9 TYPE 2 DIABETES MELLITUS (H): ICD-10-CM

## 2021-09-30 NOTE — PROGRESS NOTES
[facility] Care Coordination Contact    [employer]:  Faxed completed PCA assessment to [facility] and mailed copies to member.  Faxed MD Communication to PCP.  Emailed referral form for auth to [employer].    Mailed POC signature page and  PCA signature page to member to sign and return asap.    Prashant Vargas  [facility]  Case Management Specialist  [phone]

## 2021-10-01 NOTE — TELEPHONE ENCOUNTER
"Last Written Prescription Date:  10/14/20  Last Fill Quantity: 180,  # refills: 3   Last office visit provider:  9/27/21     Requested Prescriptions   Pending Prescriptions Disp Refills     metFORMIN (GLUCOPHAGE) 500 MG tablet [Pharmacy Med Name: METFORMIN 500MG TABLETS] 180 tablet 3     Sig: TAKE 1 TABLET(500 MG) BY MOUTH TWICE DAILY WITH MEALS       Biguanide Agents Passed - 9/30/2021  5:54 AM        Passed - Patient is age 10 or older        Passed - Patient has documented A1c within the specified period of time.     If HgbA1C is 8 or greater, it needs to be on file within the past 3 months.  If less than 8, must be on file within the past 6 months.     Recent Labs   Lab Test 07/21/21  0911   A1C 5.8*             Passed - Patient's CR is NOT>1.4 OR Patient's EGFR is NOT<45 within past 12 mos.     Recent Labs   Lab Test 07/21/21  0911 11/11/20  1123   GFRESTIMATED 89 >60   GFRESTBLACK  --  >60       Recent Labs   Lab Test 07/21/21  0911   CR 0.66             Passed - Patient does NOT have a diagnosis of CHF.        Passed - Medication is active on med list        Passed - Patient is not pregnant        Passed - Patient has not had a positive pregnancy test within the past 12 mos.         Passed - Recent (6 mo) or future (30 days) visit within the authorizing provider's specialty     Patient had office visit in the last 6 months or has a visit in the next 30 days with authorizing provider or within the authorizing provider's specialty.  See \"Patient Info\" tab in inbasket, or \"Choose Columns\" in Meds & Orders section of the refill encounter.                 Quincy Dang RN 10/01/21 9:56 AM  "

## 2021-10-03 PROBLEM — J44.9 COPD (CHRONIC OBSTRUCTIVE PULMONARY DISEASE) (H): Status: RESOLVED | Noted: 2017-05-23 | Resolved: 2021-10-03

## 2021-10-03 ASSESSMENT — ENCOUNTER SYMPTOMS
MYALGIAS: 1
BACK PAIN: 1
BRUISES/BLEEDS EASILY: 0
ARTHRALGIAS: 1
DYSPHORIC MOOD: 1
BRUISES/BLEEDS EASILY: 0
DYSPHORIC MOOD: 0
FATIGUE: 1

## 2021-10-03 NOTE — PROGRESS NOTES
Assessment & Plan    1. Trigger point of thoracic region  2. Fibromyalgia  This is a 72 yo female with chronic pain syndrome related to her fibromyalgia/trigger points.  She does well with frequent (q monthly) trigger point injections.  This was accomplished today - see note  - lidocaine 1 % 10 mL     Procedure Note - Trigger Point Injections    Consent obtained: verbal    Indication:  Chronic pain - trigger points    6 trigger points identified.  Each trigger point swabbed x 3 with Betadine swab.  Each trigger point then injected with 1.6  ml of 1% Lidocaine (no epi).    Total volume injected:  10 ml    Complications:  None, patient tolerated procedure well.    Plan:  Discussed care with patient.  RTC for further injections in 30 days prn.    3. Moderate episode of recurrent major depressive disorder (H)  Patient notes depression stems from inactivity, inability to do much with chronic pain.  But, generally stable - no new symptoms.        Return in about 4 weeks (around 10/25/2021) for trigger point injections.    Chief Complaint   Patient presents with     trigger finger injection      HPI  HPI     Patient Active Problem List   Diagnosis     Abnormal Weight Loss     Osteoarthritis Of The Shoulder     Chronic Constipation     Onychomycosis Of The Toenails     Diarrhea     Ganglion Of The Left Wrist     Larynx Edema     Obesity     Medial Epicondylitis     Skin Lesion     Urinary Incontinence     Chronic Major Depression     Dry Eye Syndrome     Nicotine Dependence     Hypokalemia     Essential hypertension     Muscle Cramps In The Calf     Edema     Atrial flutter (H)     Lump In / On The Skin     Chronic pain syndrome     Paroxysmal Atrial Fibrillation     Fibromyalgia     Nausea     Abdominal Pain     Peptic Ulcer     Mixed hyperlipidemia     Chronic Reflux Esophagitis     Benign Adenomatous Polyp Of The Large Intestine     Vertigo     Rotator cuff tendonitis     Generalized osteoarthrosis, involving multiple  sites     Tendonitis of wrist, left     Trigger point of thoracic region     Flashers or floaters of right eye     Menopause     Tendonitis of wrist, right     Skin lesion of face     Hematoma of abdominal wall     Headache     Cervical neck pain with evidence of disc disease     Controlled substance agreement signed     Aspiration pneumonia (H)     Type 2 diabetes mellitus with hypoglycemia (H)     Candidal intertrigo     Osteoarthritis, hip, bilateral     Primary osteoarthritis of left knee     Osteoarthritis of both knees     Syncope     Opacity of lung on imaging study     Acute gastritis     Gastroenteritis     Nonrheumatic aortic valve stenosis     Bunion, left     Callus of foot     Cataract     Chronic anemia     Anemia due to blood loss, acute     Diabetic polyneuropathy associated with type 2 diabetes mellitus (H)     Upper GI bleed     Melena     Dyslipidemia     Skin lesion     Mixed stress and urge urinary incontinence     Primary osteoarthritis of both knees     Advanced directives, counseling/discussion     Chronic gastric ulcer without hemorrhage and without perforation     Atrial fibrillation, unspecified type (H)        Past Medical History:   Diagnosis Date     Anemia      Aortic stenosis      Atrial fibrillation (H)      Atrial flutter (H)      Benign neoplasm of adenomatous polyp     large intestine      Chronic constipation      Chronic pain syndrome      COPD (chronic obstructive pulmonary disease) (H)     Oxygen at night      Dependence on supplemental oxygen     Oxygen at noc, during the day as needed     Depression      Diabetes mellitus (H)      Dry eye syndrome      Fibromyalgia      Ganglion     left wrist     GERD (gastroesophageal reflux disease)      Hyperlipidemia      Hypertension      Hypokalemia      Infective otitis externa, unspecified     Created by Conversion      Larynx edema      Lung disease      Malignant neoplasm of vulva (H)     Created by [facility]  "Annotation: Apr 17 2007  8:24AM - BrayantomasCurtCammy Rowan:  resection per Dr. Alfonso Mane 9/06;  Replacement Utility updated for latest IMO load     Medial epicondylitis      Onychomycosis      Osteoarthritis      Peptic ulcer      Polyneuropathy      Vulvar malignant neoplasm (H)         Current Outpatient Medications   Medication     ACCU-CHEK SOFTCLIX LANCETS lancets     albuterol (PROAIR HFA;PROVENTIL HFA;VENTOLIN HFA) 90 mcg/actuation inhaler     atorvastatin (LIPITOR) 20 MG tablet     BD ULTRA-FINE SHORT PEN NEEDLE 31 gauge x 5/16\" Ndle     blood-glucose meter (ONETOUCH VERIO IQ METER) Misc     budesonide-formoteroL (SYMBICORT) 160-4.5 mcg/actuation inhaler     cyclobenzaprine (FLEXERIL) 10 MG tablet     diaper,brief,adult,disposable (ADULT BRIEFS - LARGE) Misc     diltiazem ER COATED BEADS (CARDIZEM CD/CARTIA XT) 120 MG 24 hr capsule     furosemide (LASIX) 20 MG tablet     gabapentin (NEURONTIN) 600 MG tablet     generic lancets (FINGERSTIX LANCETS)     meclizine (ANTIVERT) 25 MG tablet     mometasone-formoterol (DULERA) 200-5 mcg/actuation HFAA inhaler     nystatin (NYSTOP) powder     omeprazole (PRILOSEC) 20 MG DR capsule     ONETOUCH VERIO TEST STRIPS strips     oxyCODONE IR (ROXICODONE) 10 MG tablet     sucralfate (CARAFATE) 1 gram tablet     warfarin ANTICOAGULANT (COUMADIN/JANTOVEN) 5 MG tablet     metFORMIN (GLUCOPHAGE) 500 MG tablet     Current Facility-Administered Medications   Medication     lidocaine 1 % 10 mL        Past Surgical History:   Procedure Laterality Date     BIOPSY BREAST Right      BIOPSY BREAST Right 01/28/2015     BIOPSY BREAST Right 1/28/2015    Procedure: RIGHT BREAST BIOPSY AFTER WIRE LOCALIZATION AT 0940;  Surgeon: Renée Soriano MD;  Location: [facility];  Service:      BIOPSY OF BREAST, INCISIONAL      Description: Incisional Breast Biopsy;  Recorded: 11/13/2007;  Comments: benign     C SUPRACERV ABD HYSTERECTOMY      Description: Supracervical Hysterectomy;  " Proc Date: 01/01/1985;  Comments: some cervix left!; ovaries intact; done for bleeding     COLONOSCOPY N/A 6/14/2019    Procedure: COLONOSCOPY;  Surgeon: Eduardo Mora MD;  Location: [facility];  Service: Gastroenterology     ESOPHAGOSCOPY, GASTROSCOPY, DUODENOSCOPY (EGD), COMBINED N/A 11/6/2018    Procedure: ESOPHAGOGASTRODUODENOSCOPY;  Surgeon: Lit Fernando MD;  Location: [facility];  Service:      HYSTERECTOMY       JOINT REPLACEMENT Left     TKA     KY ABLATE HEART DYSRHYTHM FOCUS      Description: Catheter Ablation Atrial Fibrillation;  Recorded: 07/31/2012;  Comments: 7/24/12 PVI with Dr. Gardiner and nilay to all 5 pulm veins and CTI fl ablation line as well.        Social History     Socioeconomic History     Marital status:      Spouse name: Not on file     Number of children: Not on file     Years of education: Not on file     Highest education level: Not on file   Occupational History     Not on file   Tobacco Use     Smoking status: Current Every Day Smoker     Packs/day: 0.50     Types: Cigarettes     Smokeless tobacco: Never Used   Substance and Sexual Activity     Alcohol use: Yes     Comment: Alcoholic Drinks/day: very little     Drug use: No     Sexual activity: Not on file   Other Topics Concern     Not on file   Social History Narrative     Not on file     Social Determinants of Health     Financial Resource Strain:      Difficulty of Paying Living Expenses:    Food Insecurity:      Worried About Running Out of Food in the Last Year:      Ran Out of Food in the Last Year:    Transportation Needs:      Lack of Transportation (Medical):      Lack of Transportation (Non-Medical):    Physical Activity:      Days of Exercise per Week:      Minutes of Exercise per Session:    Stress:      Feeling of Stress :    Social Connections:      Frequency of Communication with Friends and Family:      Frequency of Social Gatherings with Friends and Family:      Attends Jewish  Services:      Active Member of Clubs or Organizations:      Attends Club or Organization Meetings:      Marital Status:    Intimate Partner Violence:      Fear of Current or Ex-Partner:      Emotionally Abused:      Physically Abused:      Sexually Abused:         Family History   Problem Relation Age of Onset     Heart Failure Mother      Cancer Other         paternal HX-laryngeal      Alcoholism Sister      No Known Problems Daughter      No Known Problems Maternal Grandmother      No Known Problems Maternal Grandfather      No Known Problems Paternal Grandmother      No Known Problems Paternal Grandfather      No Known Problems Maternal Aunt      No Known Problems Paternal Aunt      Alcoholism Sister      Alcoholism Brother      Alcoholism Father      Cancer Paternal Uncle         Gastric-Alcohol     Cancer Paternal Uncle         gastric-Alcohol     Hereditary Breast and Ovarian Cancer Syndrome No family hx of      Breast Cancer No family hx of      Colon Cancer No family hx of      Endometrial Cancer No family hx of      Ovarian Cancer No family hx of         Review of Systems   Constitutional: Positive for fatigue.   Musculoskeletal: Positive for arthralgias and back pain.        Pain in muscles at base of neck as well as suprascapular and rhomboidal   Hematological: Does not bruise/bleed easily.   Psychiatric/Behavioral: Positive for dysphoric mood.   All other systems reviewed and are negative.       /74 (BP Location: Right arm, Patient Position: Sitting, Cuff Size: Adult Regular)   Pulse 91   Temp 97.1  F (36.2  C) (Temporal)   Wt 67.6 kg (149 lb)   BMI 25.58 kg/m       Physical Exam  Constitutional:       Comments: Looks tired, chronically ill,   HENT:      Head: Normocephalic and atraumatic.   Eyes:      Extraocular Movements: Extraocular movements intact.   Cardiovascular:      Rate and Rhythm: Normal rate and regular rhythm.      Pulses: Normal pulses.      Heart sounds: Normal heart sounds.  "No murmur heard.     Pulmonary:      Effort: Pulmonary effort is normal. No respiratory distress.      Breath sounds: Normal breath sounds.   Abdominal:      Palpations: Abdomen is soft.      Tenderness: There is no abdominal tenderness. There is no guarding or rebound.   Musculoskeletal:         General: Deformity (chronic DJD) present.      Cervical back: Tenderness (in distal cervical muscles, extending into suprascapular,a nd distally into rhomboids) present.   Skin:     General: Skin is dry.   Neurological:      General: No focal deficit present.      Mental Status: She is alert.          Results:  Results for orders placed or performed in visit on 09/27/21   INR point of care     Status: Abnormal   Result Value Ref Range    INR 2.7 (H) 0.9 - 1.1    Narrative    This test is intended for monitoring Coumadin therapy. Results are not accurate in patients with prolonged INR due to factor deficiency.       Medications at Conclusion of Visit:  Current Outpatient Medications   Medication Sig Dispense Refill     ACCU-CHEK SOFTCLIX LANCETS lancets [ACCU-CHEK SOFTCLIX LANCETS LANCETS] TEST THREE TIMES DAILY 300 each 3     albuterol (PROAIR HFA;PROVENTIL HFA;VENTOLIN HFA) 90 mcg/actuation inhaler [ALBUTEROL (PROAIR HFA;PROVENTIL HFA;VENTOLIN HFA) 90 MCG/ACTUATION INHALER] INHALE 2 PUFFS EVERY 4 HOURS AS NEEDED WHEEZING 90 g 11     atorvastatin (LIPITOR) 20 MG tablet [ATORVASTATIN (LIPITOR) 20 MG TABLET] TAKE 1 TABLET(20 MG) BY MOUTH AT BEDTIME 90 tablet 3     BD ULTRA-FINE SHORT PEN NEEDLE 31 gauge x 5/16\" Ndle [BD ULTRA-FINE SHORT PEN NEEDLE 31 GAUGE X 5/16\" NDLE] TEST FOUR TIMES DAILY WITH MEALS AND AT BEDTIME 400 each 3     blood-glucose meter (ONETOUCH VERIO IQ METER) Misc [BLOOD-GLUCOSE METER (ONETOUCH VERIO IQ METER) MISC] Check blood sugar three times a day. 1 each 0     budesonide-formoteroL (SYMBICORT) 160-4.5 mcg/actuation inhaler [BUDESONIDE-FORMOTEROL (SYMBICORT) 160-4.5 MCG/ACTUATION INHALER] Inhale 2 puffs " 2 (two) times a day. Inhale 2 puff by mouth twice daily 1 Inhaler 5     cyclobenzaprine (FLEXERIL) 10 MG tablet Take 1 tablet (10 mg) by mouth nightly as needed for muscle spasms 30 tablet 0     diaper,brief,adult,disposable (ADULT BRIEFS - LARGE) Misc [DIAPER,BRIEF,ADULT,DISPOSABLE (ADULT BRIEFS - LARGE) MISC] Use 3-4 daily as needed for incontinence 120 each 6     diltiazem ER COATED BEADS (CARDIZEM CD/CARTIA XT) 120 MG 24 hr capsule Take 1 capsule (120 mg) by mouth daily 90 capsule 1     furosemide (LASIX) 20 MG tablet Take 1 tablet (20 mg) by mouth daily as needed 90 tablet 3     gabapentin (NEURONTIN) 600 MG tablet [GABAPENTIN (NEURONTIN) 600 MG TABLET] TAKE 1 TABLET(600 MG) BY MOUTH THREE TIMES DAILY 270 tablet 3     generic lancets (FINGERSTIX LANCETS) [GENERIC LANCETS (FINGERSTIX LANCETS)] Dispense brand per patient's insurance at pharmacy discretion. 300 each 0     meclizine (ANTIVERT) 25 MG tablet Take 1 tablet (25 mg) by mouth 3 times daily as needed for dizziness or nausea 45 tablet 5     mometasone-formoterol (DULERA) 200-5 mcg/actuation HFAA inhaler [MOMETASONE-FORMOTEROL (DULERA) 200-5 MCG/ACTUATION HFAA INHALER] Inhale 2 puffs 2 (two) times a day. 1 Inhaler 5     nystatin (NYSTOP) powder [NYSTATIN (NYSTOP) POWDER] Apply 1 application topically 2 (two) times a day as needed. 2-3 times to affected area(s). 60 g 3     omeprazole (PRILOSEC) 20 MG DR capsule TAKE 1 CAPSULE(20 MG) BY MOUTH DAILY BEFORE BREAKFAST 30 capsule 3     ONETOUCH VERIO TEST STRIPS strips [ONETOUCH VERIO TEST STRIPS STRIPS] USE 1 EACH AS DIRECTED THREE TIMES DAILY AS NEEDED 300 strip 3     oxyCODONE IR (ROXICODONE) 10 MG tablet Take 1 tablet (10 mg) by mouth every 6 hours as needed for moderate to severe pain 120 tablet 0     sucralfate (CARAFATE) 1 gram tablet [SUCRALFATE (CARAFATE) 1 GRAM TABLET] TAKE 1 TABLET BY MOUTH FOUR TIMES DAILY(CRUSH TABLET AND MIX IT WITH A LITTLE WATER, THEN SWALLOW) 120 tablet 12     warfarin  ANTICOAGULANT (COUMADIN/JANTOVEN) 5 MG tablet [WARFARIN ANTICOAGULANT (COUMADIN/JANTOVEN) 5 MG TABLET] Take 1 tablet (5 mg) daily as directed.  Adjust dose per INR results. 90 tablet 1     metFORMIN (GLUCOPHAGE) 500 MG tablet TAKE 1 TABLET(500 MG) BY MOUTH TWICE DAILY WITH MEALS 180 tablet 1         SAVANA SILVER MD

## 2021-10-08 ENCOUNTER — TELEPHONE (OUTPATIENT)
Dept: ANTICOAGULATION | Facility: CLINIC | Age: 71
End: 2021-10-08

## 2021-10-08 NOTE — TELEPHONE ENCOUNTER
ANTICOAGULATION     Mary Kay Tejada is overdue for INR check.      Left message for patient to call and schedule lab appointment as soon as possible. If returning call, please schedule.     Kami Bryant

## 2021-10-14 ENCOUNTER — PATIENT OUTREACH (OUTPATIENT)
Dept: GERIATRIC MEDICINE | Facility: CLINIC | Age: 71
End: 2021-10-14

## 2021-10-14 NOTE — LETTER
October 14, 2021    Important [employer] Information    MARY KAY HINDS  [address]  [facility], [place], [facility] [address]  Your Care Plan  Dear Mary Kay,  When we spoke recently, I promised to send you a Care Plan. The plan enclosed is a summary of our discussion. It includes the steps we agreed would help you meet your health goals. In addition, I can help you with:  [employer]  This program is available to members who need a ride to medical and dental visits. To schedule a ride, call [phone] or [phone] (toll free). TTY: 711. You can call 8 a.m. to 8 p.m. Seven days a week. Access to a representative may be limited at times.    [employer]   The [employer] program empowers you to improve your health through education and exercise. To learn more, visit [employer], or call [employer] at [phone] (toll free) (TTY:711) from 7 a.m. - 7 p.m. Central Time, Monday-Friday.  Health Care Directive   This form helps you outline your health care wishes. You can request a form from me and I will answer any questions you have before you discuss it with your doctor.   Annual Physical  Take a key step on your path to good health and set up an annual physical at your clinic.  Questions?  Call me at [phone] Monday-Friday between 8am and 5pm.  TTY: 711. As we discussed, I plan to be in touch with you again in 6 months to follow up via phone.  Sincerely,    Samantha Alexandra RN, PHN  [phone]  [email or web address]    cc: member records                                                                                             CB5 (Os) (5-2020)    Civil Rights Notice  Discrimination is against the law. [employer] does not discriminate on the basis of any of the following:    Race    Color    National Origin    Creed    Zoroastrianism    Age    Public Assistance Status    Receipt of Health Care Services    Disability (including physical or mental impairment)    Sex (including sex stereotypes and  gender identity)    Marital Status    Political Beliefs    Medical Condition    Genetic Information    Sexual Orientation    Claims Experience    Medical History    Health Status    Auxiliary Aids and Services:  [employer] provides auxiliary aids and services, like qualified interpreters or information in accessible formats, free of charge and in a timely manner, to ensure an equal opportunity to participate in our health care programs. Contact [employer] at [employer]/contact medicaid or call [phone] (toll free); TTY:714 or at [email or web address].    Language Assistance Services:  [employer] provides translated documents and spoken language interpreting, free of charge and in a timely manner, when language assistance services are necessary to ensure limited English speakers have meaningful access to our information and services. Contact [employer] at [phone] (toll free); TTY: 150 or [email or web address].     Civil Rights Complaints  You have the right to file a discrimination complaint if you believe you were treated in a discriminatory way by [facility]. You may contact any of the following four agencies directly to file a discrimination complaint.    U.S. Department of Health and Human Services  Office for Civil Rights (OCR)  You have the right to file a complaint with the OCR, a federal agency, if you believe you have been discriminated against because of any of the following:    Race    Disability    Color    Sex    National Origin    Age    Advent (in some cases)    Contact the OCR directly to file a complaint:         Director         U.S. Department of Health and Human Services  Office for Civil Rights         [address] [place] [address], [place] [address]         Customer Response Center: Toll-free: [phone]          TDD: [phone]         Email: [email or web address]    [place] Department of Human Rights ([facility])  In [place], you have the right to  file a complaint with the MDHR if you believe you have been discriminated against because of any of the following:      Race    Color    National Origin    Mosque    Creed    Sex    Sexual Orientation    Marital Status    Public Assistance Status    Disability    Contact the MDHR directly to file a complaint:         [facility] of Human Rights         [address] [address]         [phone] (voice)          [phone] (toll free)         711 or [phone] ([place] Rel[employer])         [phone] (Fax)         [email or web address] (Email)     [place] Department of Human Services (DHS)  You have the right to file a complaint with [facility] if you believe you have been discriminated against in our health care programs because of any of the following:    Race    Color    National Origin    Creed    Mosque    Age    Public Assistance Status    Receipt of Health Care Services    Disability (including physical or mental impairment)    Sex (including sex stereotypes and gender identity)    Marital Status    Political Beliefs    Medical Condition    Genetic Information    Sexual Orientation    Claims Experience    Medical History    Health Status    Complaints must be in writing and filed within 180 days of the date you discovered the alleged discrimination. The complaint must contain your name and address and describe the discrimination you are complaining about. After we get your complaint, we will review it and notify you in writing about whether we have authority to investigate. If we do, we will investigate the complaint.      [facility] will notify you in writing of the investigation s outcome. You have a right to appeal the outcome if you disagree with the decision. To appeal, you must send a written request to have [facility] review the investigation outcome. Be brief and state why you disagree with the decision. Include additional information you think is important.       If you file a complaint in this way, the people who work for the agency named in the complaint cannot retaliate against you. This means they cannot punish you in any way for filing a complaint. Filing a complaint in this way does not stop you from seeking out other legal or administration actions.     Contact DHS directly to file a discrimination complaint:        Civil Rights Coordinator        [facility] of Human Services        Equal Opportunity and Access Division        [address] [address]        [place] [address] [phone] (voice) or use your preferred relay service     [facility] Complaint Notice   You have the right to file a complaint with [employer] if you believe you have been discriminated against because of any of the following:       Medical condition    Health status    Receipt of health care services    Claims experience    Medical history    Genetic information    Disability (including mental or physical impairment)    Marital status    Age    Sex (including sex stereotypes and gender identity)    Sexual orientation    National origin    Race    Color    Judaism    Creed    Public assistance status    Political beliefs    You can file a complaint and ask for help in filing a complaint in person or by mail, phone, fax, or email at:     [facility] Civil Rights Coordinator  [facility]  [address], [place] [address] [phone] (voice and fax) or TTY:245  Email: [email or web address]    American Indians can begin or continue to use [facility] and [facility] ([facility]) clinics. We will not require prior approval or impose any conditions for you to get services at these clinics. For elders age 65 years and older this includes Elderly Waiver (EW) services accessed through the [place]. If a doctor or other provider in a [facility] or [facility] clinic refers you to a provider in our network, we will not require you to see your primary care provider  prior to the referral.

## 2021-10-16 ENCOUNTER — HEALTH MAINTENANCE LETTER (OUTPATIENT)
Age: 71
End: 2021-10-16

## 2021-10-19 NOTE — PROGRESS NOTES
[facility] Care Coordination Contact    Received after visit chart from care coordinator.  Completed following tasks: Mailed copy of care plan to client, Updated services in access and Submitted referrals/auths for homemaking and wipes   and Provider Signature - No POC Shared:  Member indicates that they do not want their POC shared with any [facility] providers.     Prashant Vargas  [facility]  Case Management Specialist  [phone]

## 2021-10-25 ENCOUNTER — OFFICE VISIT (OUTPATIENT)
Dept: FAMILY MEDICINE | Facility: CLINIC | Age: 71
End: 2021-10-25
Payer: COMMERCIAL

## 2021-10-25 ENCOUNTER — ANTICOAGULATION THERAPY VISIT (OUTPATIENT)
Dept: ANTICOAGULATION | Facility: CLINIC | Age: 71
End: 2021-10-25

## 2021-10-25 VITALS
SYSTOLIC BLOOD PRESSURE: 130 MMHG | HEART RATE: 78 BPM | WEIGHT: 150 LBS | DIASTOLIC BLOOD PRESSURE: 80 MMHG | OXYGEN SATURATION: 94 % | TEMPERATURE: 98.1 F | BODY MASS INDEX: 25.75 KG/M2

## 2021-10-25 DIAGNOSIS — M79.7 FIBROMYALGIA: Primary | ICD-10-CM

## 2021-10-25 DIAGNOSIS — I48.91 ATRIAL FIBRILLATION, UNSPECIFIED TYPE (H): ICD-10-CM

## 2021-10-25 DIAGNOSIS — I48.91 ATRIAL FIBRILLATION (H): Primary | ICD-10-CM

## 2021-10-25 DIAGNOSIS — M54.6 TRIGGER POINT OF THORACIC REGION: ICD-10-CM

## 2021-10-25 DIAGNOSIS — G89.4 CHRONIC PAIN SYNDROME: ICD-10-CM

## 2021-10-25 LAB — INR BLD: 3.1 (ref 0.9–1.1)

## 2021-10-25 PROCEDURE — G0008 ADMIN INFLUENZA VIRUS VAC: HCPCS | Performed by: FAMILY MEDICINE

## 2021-10-25 PROCEDURE — 36416 COLLJ CAPILLARY BLOOD SPEC: CPT | Performed by: FAMILY MEDICINE

## 2021-10-25 PROCEDURE — 20553 NJX 1/MLT TRIGGER POINTS 3/>: CPT | Performed by: FAMILY MEDICINE

## 2021-10-25 PROCEDURE — 90662 IIV NO PRSV INCREASED AG IM: CPT | Performed by: FAMILY MEDICINE

## 2021-10-25 PROCEDURE — 85610 PROTHROMBIN TIME: CPT | Performed by: FAMILY MEDICINE

## 2021-10-25 PROCEDURE — 99213 OFFICE O/P EST LOW 20 MIN: CPT | Mod: 25 | Performed by: FAMILY MEDICINE

## 2021-10-25 RX ORDER — OXYCODONE HYDROCHLORIDE 10 MG/1
10 TABLET ORAL EVERY 6 HOURS PRN
Qty: 120 TABLET | Refills: 0 | Status: SHIPPED | OUTPATIENT
Start: 2021-10-25 | End: 2021-11-22

## 2021-10-25 NOTE — PROGRESS NOTES
ANTICOAGULATION MANAGEMENT     Mary Kay Tejada 71 year old female is on warfarin with supratherapeutic INR result. (Goal INR 2.0-3.0)    Recent labs: (last 7 days)     10/25/21  0958   INR 3.1*       ASSESSMENT     Source(s): Chart Review, Patient/Caregiver Call and Template       Warfarin doses taken: Warfarin taken as instructed    Diet: upset stomach/not feeling well may be affecting diet and INR    New illness, injury, or hospitalization: No    Medication/supplement changes: None noted    Signs or symptoms of bleeding or clotting: No    Previous INR: Therapeutic last visit; previously outside of goal range    Additional findings: None     PLAN     Recommended plan for temporary change(s) affecting INR     Dosing Instructions: Continue your current warfarin dose with next INR in 2 weeks       Summary  As of 10/25/2021    Full warfarin instructions:  2.5 mg every Sat; 5 mg all other days   Next INR check:  11/8/2021             Telephone call with Mary Kay who verbalizes understanding and agrees to plan    Patient offered & declined to schedule next visit    Education provided: Goal range and significance of current result and Contact [phone] with any changes, questions or concerns.     Plan made per [facility] anticoagulation protocol    Yolis Hdz RN  Anticoagulation Clinic  10/25/2021    _______________________________________________________________________     Anticoagulation Episode Summary     Current INR goal:  2.0-3.0   TTR:  61.1 % (1 y)   Target end date:  Indefinite   Send INR reminders to:  [facility]    Indications    Paroxysmal Atrial Fibrillation [I48.91]  Atrial fibrillation  unspecified type (H) [I48.91]           Comments:           Anticoagulation Care Providers     Provider Role Specialty Phone number    Cammy Bui MD Referring Family Medicine [phone]

## 2021-10-25 NOTE — PROGRESS NOTES
Assessment & Plan    1. Fibromyalgia  2. Trigger point of thoracic region    Here for trigger point injections - this keeps dosing of opiates at a minimum   - oxyCODONE IR (ROXICODONE) 10 MG tablet; Take 1 tablet (10 mg) by mouth every 6 hours as needed for moderate to severe pain  Dispense: 120 tablet; Refill: 0    Procedure Note - Trigger Point Injections    Verbal consent obtained    Indication: fibromyalgia/chronic pain    6 trigger points identified.  Each trigger point swabbed x 3 with Betadine swab.  Each trigger point then injected with 1.6 ml of 1% Lidocaine (no epi).    Total volume injected:  10 ml    Complications:  None, patient tolerated procedure well.    Plan:  Discussed care with patient.  RTC for further injections in 30 days prn.      3. Atrial fibrillation, unspecified type (H)  Patient with chronic atrial fib - takes warfarin - needs INR today - followed by anticoag program  - INR point of care    4. Chronic pain syndrome  Reviewed  - fills monthly - uses irregularly -  - oxyCODONE IR (ROXICODONE) 10 MG tablet; Take 1 tablet (10 mg) by mouth every 6 hours as needed for moderate to severe pain  Dispense: 120 tablet; Refill: 0    COVID-19 booster next visit -   Return in about 4 weeks (around 11/22/2021) for trigger point injections.    Chief Complaint   Patient presents with     Trigger Point Injection     Flu Shot      HPI  Here for chronic pain syndrome/fibromyalgia  Desires monthly trigger point injections -   Pain is bad in both left and right side    Otherwise feeling relatively well - no significant changes  No bleeding   No hypoglycemia             Patient Active Problem List   Diagnosis     Abnormal Weight Loss     Osteoarthritis Of The Shoulder     Chronic Constipation     Onychomycosis Of The Toenails     Diarrhea     Ganglion Of The Left Wrist     Larynx Edema     Obesity     Medial Epicondylitis     Skin Lesion     Urinary Incontinence     Chronic Major Depression     Dry Eye  Syndrome     Nicotine Dependence     Hypokalemia     Essential hypertension     Muscle Cramps In The Calf     Edema     Atrial flutter (H)     Lump In / On The Skin     Chronic pain syndrome     Paroxysmal Atrial Fibrillation     Fibromyalgia     Nausea     Abdominal Pain     Peptic Ulcer     Mixed hyperlipidemia     Chronic Reflux Esophagitis     Benign Adenomatous Polyp Of The Large Intestine     Vertigo     Rotator cuff tendonitis     Generalized osteoarthrosis, involving multiple sites     Tendonitis of wrist, left     Trigger point of thoracic region     Flashers or floaters of right eye     Menopause     Tendonitis of wrist, right     Skin lesion of face     Hematoma of abdominal wall     Headache     Cervical neck pain with evidence of disc disease     Controlled substance agreement signed     Aspiration pneumonia (H)     Type 2 diabetes mellitus with hypoglycemia (H)     Candidal intertrigo     Osteoarthritis, hip, bilateral     Primary osteoarthritis of left knee     Osteoarthritis of both knees     Syncope     Opacity of lung on imaging study     Acute gastritis     Gastroenteritis     Nonrheumatic aortic valve stenosis     Bunion, left     Callus of foot     Cataract     Chronic anemia     Anemia due to blood loss, acute     Diabetic polyneuropathy associated with type 2 diabetes mellitus (H)     Upper GI bleed     Melena     Dyslipidemia     Skin lesion     Mixed stress and urge urinary incontinence     Primary osteoarthritis of both knees     Advanced directives, counseling/discussion     Chronic gastric ulcer without hemorrhage and without perforation     Atrial fibrillation, unspecified type (H)        Past Medical History:   Diagnosis Date     Anemia      Aortic stenosis      Atrial fibrillation (H)      Atrial flutter (H)      Benign neoplasm of adenomatous polyp     large intestine      Chronic constipation      Chronic pain syndrome      COPD (chronic obstructive pulmonary disease) (H)     Oxygen  "at night      Dependence on supplemental oxygen     Oxygen at noc, during the day as needed     Depression      Diabetes mellitus (H)      Dry eye syndrome      Fibromyalgia      Ganglion     left wrist     GERD (gastroesophageal reflux disease)      Hyperlipidemia      Hypertension      Hypokalemia      Infective otitis externa, unspecified     Created by Conversion      Larynx edema      Lung disease      Malignant neoplasm of vulva (H)     Created by Conversion [facility] Annotation: Apr 17 2007  8:24AM - Cammy Bui:  resection per Dr. Alfonso Mane 9/06;  Replacement Utility updated for latest IMO load     Medial epicondylitis      Onychomycosis      Osteoarthritis      Peptic ulcer      Polyneuropathy      Vulvar malignant neoplasm (H)         Current Outpatient Medications   Medication     ACCU-CHEK SOFTCLIX LANCETS lancets     albuterol (PROAIR HFA;PROVENTIL HFA;VENTOLIN HFA) 90 mcg/actuation inhaler     atorvastatin (LIPITOR) 20 MG tablet     BD ULTRA-FINE SHORT PEN NEEDLE 31 gauge x 5/16\" Ndle     blood-glucose meter (ONETOUCH VERIO IQ METER) Misc     budesonide-formoteroL (SYMBICORT) 160-4.5 mcg/actuation inhaler     cyclobenzaprine (FLEXERIL) 10 MG tablet     diaper,brief,adult,disposable (ADULT BRIEFS - LARGE) Misc     diltiazem ER COATED BEADS (CARDIZEM CD/CARTIA XT) 120 MG 24 hr capsule     furosemide (LASIX) 20 MG tablet     gabapentin (NEURONTIN) 600 MG tablet     generic lancets (FINGERSTIX LANCETS)     meclizine (ANTIVERT) 25 MG tablet     metFORMIN (GLUCOPHAGE) 500 MG tablet     mometasone-formoterol (DULERA) 200-5 mcg/actuation HFAA inhaler     nystatin (NYSTOP) powder     omeprazole (PRILOSEC) 20 MG DR capsule     ONETOUCH VERIO TEST STRIPS strips     oxyCODONE IR (ROXICODONE) 10 MG tablet     sucralfate (CARAFATE) 1 gram tablet     warfarin ANTICOAGULANT (COUMADIN/JANTOVEN) 5 MG tablet     No current facility-administered medications for this visit.        Past Surgical History: "   Procedure Laterality Date     BIOPSY BREAST Right      BIOPSY BREAST Right 01/28/2015     BIOPSY BREAST Right 1/28/2015    Procedure: RIGHT BREAST BIOPSY AFTER WIRE LOCALIZATION AT 0940;  Surgeon: Renée Soriano MD;  [place] [facility] [place]:      BIOPSY OF BREAST, INCISIONAL      Description: Incisional Breast Biopsy;  Recorded: 11/13/2007;  Comments: benign     C SUPRACERV ABD HYSTERECTOMY      Description: Supracervical Hysterectomy;  Proc Date: 01/01/1985;  Comments: some cervix left!; ovaries intact; done for bleeding     COLONOSCOPY N/A 6/14/2019    Procedure: COLONOSCOPY;  Surgeon: Eduardo Mora MD;  [place] [facility] [place]  Service: Gastroenterology     ESOPHAGOSCOPY, GASTROSCOPY, DUODENOSCOPY (EGD), COMBINED N/A 11/6/2018    Procedure: ESOPHAGOGASTRODUODENOSCOPY;  Surgeon: Lit Fernando MD;  [place] [facility] [place]  Service:      HYSTERECTOMY       JOINT REPLACEMENT Left     TKA     SC ABLATE HEART DYSRHYTHM FOCUS      Description: Catheter Ablation Atrial Fibrillation;  Recorded: 07/31/2012;  Comments: 7/24/12 PVI with Dr. Gardiner and nilay to all 5 pulm veins and CTI fl ablation line as well.        Social History     Socioeconomic History     Marital status:      Spouse name: Not on file     Number of children: Not on file     Years of education: Not on file     Highest education level: Not on file   Occupational History     Not on file   Tobacco Use     Smoking status: Current Every Day Smoker     Packs/day: 0.50     Types: Cigarettes     Smokeless tobacco: Never Used   Substance and Sexual Activity     Alcohol use: Yes     Comment: Alcoholic Drinks/day: very little     Drug use: No     Sexual activity: Not on file   Other Topics Concern     Not on file   Social History Narrative     Not on file     Social Determinants of Health     Financial Resource Strain:      Difficulty of Paying Living Expenses:    Food Insecurity:      Worried About Running Out of Food  in the Last Year:      Ran Out of Food in the Last Year:    Transportation Needs:      Lack of Transportation (Medical):      Lack of Transportation (Non-Medical):    Physical Activity:      Days of Exercise per Week:      Minutes of Exercise per Session:    Stress:      Feeling of Stress :    Social Connections:      Frequency of Communication with Friends and Family:      Frequency of Social Gatherings with Friends and Family:      Attends Gnosticist Services:      Active Member of Clubs or Organizations:      Attends Club or Organization Meetings:      Marital Status:    Intimate Partner Violence:      Fear of Current or Ex-Partner:      Emotionally Abused:      Physically Abused:      Sexually Abused:         Family History   Problem Relation Age of Onset     Heart Failure Mother      Cancer Other         paternal HX-laryngeal      Alcoholism Sister      No Known Problems Daughter      No Known Problems Maternal Grandmother      No Known Problems Maternal Grandfather      No Known Problems Paternal Grandmother      No Known Problems Paternal Grandfather      No Known Problems Maternal Aunt      No Known Problems Paternal Aunt      Alcoholism Sister      Alcoholism Brother      Alcoholism Father      Cancer Paternal Uncle         Gastric-Alcohol     Cancer Paternal Uncle         gastric-Alcohol     Hereditary Breast and Ovarian Cancer Syndrome No family hx of      Breast Cancer No family hx of      Colon Cancer No family hx of      Endometrial Cancer No family hx of      Ovarian Cancer No family hx of         Review of Systems   Constitutional: Negative for activity change and fatigue.   Gastrointestinal: Negative for abdominal pain and blood in stool.   Musculoskeletal: Positive for back pain, myalgias and neck pain.   All other systems reviewed and are negative.       /80   Pulse 78   Temp 98.1  F (36.7  C)   Wt 68 kg (150 lb)   SpO2 94%   BMI 25.75 kg/m       Physical Exam  Constitutional:        General: She is not in acute distress.     Appearance: She is well-developed.   HENT:      Right Ear: Tympanic membrane and external ear normal.      Left Ear: Tympanic membrane and external ear normal.      Nose: Nose normal.      Mouth/Throat:      Pharynx: No oropharyngeal exudate.   Eyes:      General:         Right eye: No discharge.         Left eye: No discharge.      Conjunctiva/sclera: Conjunctivae normal.      Pupils: Pupils are equal, round, and reactive to light.   Neck:      Thyroid: No thyromegaly.      Trachea: No tracheal deviation.   Cardiovascular:      Rate and Rhythm: Normal rate and regular rhythm.      Pulses: Normal pulses.      Heart sounds: Normal heart sounds, S1 normal and S2 normal. No murmur heard.   No friction rub. No S3 or S4 sounds.    Pulmonary:      Effort: Pulmonary effort is normal. No respiratory distress.      Breath sounds: Normal breath sounds. No wheezing or rales.   Abdominal:      General: Bowel sounds are normal.      Palpations: Abdomen is soft. There is no mass.      Tenderness: There is no abdominal tenderness.   Musculoskeletal:         General: Normal range of motion.      Cervical back: Neck supple. Tenderness (tender at paracervical muscles) present.   Lymphadenopathy:      Cervical: No cervical adenopathy.   Skin:     General: Skin is warm and dry.      Findings: No rash.   Neurological:      Mental Status: She is alert and oriented to person, place, and time.      Motor: No abnormal muscle tone.      Deep Tendon Reflexes: Reflexes are normal and symmetric.   Psychiatric:         Thought Content: Thought content normal.         Judgment: Judgment normal.          Results:  Results for orders placed or performed in visit on 10/25/21   INR point of care     Status: Abnormal   Result Value Ref Range    INR 3.1 (H) 0.9 - 1.1    Narrative    This test is intended for monitoring Coumadin therapy. Results are not accurate in patients with prolonged INR due to factor  "deficiency.       Medications at Conclusion of Visit:  Current Outpatient Medications   Medication Sig Dispense Refill     ACCU-CHEK SOFTCLIX LANCETS lancets [ACCU-CHEK SOFTCLIX LANCETS LANCETS] TEST THREE TIMES DAILY 300 each 3     albuterol (PROAIR HFA;PROVENTIL HFA;VENTOLIN HFA) 90 mcg/actuation inhaler [ALBUTEROL (PROAIR HFA;PROVENTIL HFA;VENTOLIN HFA) 90 MCG/ACTUATION INHALER] INHALE 2 PUFFS EVERY 4 HOURS AS NEEDED WHEEZING 90 g 11     atorvastatin (LIPITOR) 20 MG tablet [ATORVASTATIN (LIPITOR) 20 MG TABLET] TAKE 1 TABLET(20 MG) BY MOUTH AT BEDTIME 90 tablet 3     BD ULTRA-FINE SHORT PEN NEEDLE 31 gauge x 5/16\" Ndle [BD ULTRA-FINE SHORT PEN NEEDLE 31 GAUGE X 5/16\" NDLE] TEST FOUR TIMES DAILY WITH MEALS AND AT BEDTIME 400 each 3     blood-glucose meter (ONETOUCH VERIO IQ METER) Misc [BLOOD-GLUCOSE METER (ONETOUCH VERIO IQ METER) MISC] Check blood sugar three times a day. 1 each 0     budesonide-formoteroL (SYMBICORT) 160-4.5 mcg/actuation inhaler [BUDESONIDE-FORMOTEROL (SYMBICORT) 160-4.5 MCG/ACTUATION INHALER] Inhale 2 puffs 2 (two) times a day. Inhale 2 puff by mouth twice daily 1 Inhaler 5     cyclobenzaprine (FLEXERIL) 10 MG tablet Take 1 tablet (10 mg) by mouth nightly as needed for muscle spasms 30 tablet 0     diaper,brief,adult,disposable (ADULT BRIEFS - LARGE) Misc [DIAPER,BRIEF,ADULT,DISPOSABLE (ADULT BRIEFS - LARGE) MISC] Use 3-4 daily as needed for incontinence 120 each 6     diltiazem ER COATED BEADS (CARDIZEM CD/CARTIA XT) 120 MG 24 hr capsule Take 1 capsule (120 mg) by mouth daily 90 capsule 1     furosemide (LASIX) 20 MG tablet Take 1 tablet (20 mg) by mouth daily as needed 90 tablet 3     gabapentin (NEURONTIN) 600 MG tablet [GABAPENTIN (NEURONTIN) 600 MG TABLET] TAKE 1 TABLET(600 MG) BY MOUTH THREE TIMES DAILY 270 tablet 3     generic lancets (FINGERSTIX LANCETS) [GENERIC LANCETS (FINGERSTIX LANCETS)] Dispense brand per patient's insurance at pharmacy discretion. 300 each 0     meclizine " (ANTIVERT) 25 MG tablet Take 1 tablet (25 mg) by mouth 3 times daily as needed for dizziness or nausea 45 tablet 5     metFORMIN (GLUCOPHAGE) 500 MG tablet TAKE 1 TABLET(500 MG) BY MOUTH TWICE DAILY WITH MEALS 180 tablet 1     mometasone-formoterol (DULERA) 200-5 mcg/actuation HFAA inhaler [MOMETASONE-FORMOTEROL (DULERA) 200-5 MCG/ACTUATION HFAA INHALER] Inhale 2 puffs 2 (two) times a day. 1 Inhaler 5     nystatin (NYSTOP) powder [NYSTATIN (NYSTOP) POWDER] Apply 1 application topically 2 (two) times a day as needed. 2-3 times to affected area(s). 60 g 3     omeprazole (PRILOSEC) 20 MG DR capsule TAKE 1 CAPSULE(20 MG) BY MOUTH DAILY BEFORE BREAKFAST 30 capsule 3     ONETOUCH VERIO TEST STRIPS strips [ONETOUCH VERIO TEST STRIPS STRIPS] USE 1 EACH AS DIRECTED THREE TIMES DAILY AS NEEDED 300 strip 3     oxyCODONE IR (ROXICODONE) 10 MG tablet Take 1 tablet (10 mg) by mouth every 6 hours as needed for moderate to severe pain 120 tablet 0     sucralfate (CARAFATE) 1 gram tablet [SUCRALFATE (CARAFATE) 1 GRAM TABLET] TAKE 1 TABLET BY MOUTH FOUR TIMES DAILY(CRUSH TABLET AND MIX IT WITH A LITTLE WATER, THEN SWALLOW) 120 tablet 12     warfarin ANTICOAGULANT (COUMADIN/JANTOVEN) 5 MG tablet [WARFARIN ANTICOAGULANT (COUMADIN/JANTOVEN) 5 MG TABLET] Take 1 tablet (5 mg) daily as directed.  Adjust dose per INR results. 90 tablet 1         SAVANA SILVER MD

## 2021-10-30 ASSESSMENT — ENCOUNTER SYMPTOMS
ACTIVITY CHANGE: 0
MYALGIAS: 1
NECK PAIN: 1
BLOOD IN STOOL: 0
BACK PAIN: 1
ABDOMINAL PAIN: 0
FATIGUE: 0

## 2021-11-11 DIAGNOSIS — E11.649 TYPE 2 DIABETES MELLITUS WITH HYPOGLYCEMIA WITHOUT COMA, WITH LONG-TERM CURRENT USE OF INSULIN (H): ICD-10-CM

## 2021-11-11 DIAGNOSIS — Z79.4 TYPE 2 DIABETES MELLITUS WITH HYPOGLYCEMIA WITHOUT COMA, WITH LONG-TERM CURRENT USE OF INSULIN (H): ICD-10-CM

## 2021-11-13 ENCOUNTER — NURSE TRIAGE (OUTPATIENT)
Dept: NURSING | Facility: CLINIC | Age: 71
End: 2021-11-13
Payer: COMMERCIAL

## 2021-11-13 RX ORDER — GABAPENTIN 600 MG/1
600 TABLET ORAL 3 TIMES DAILY
Qty: 90 TABLET | Refills: 0 | Status: SHIPPED | OUTPATIENT
Start: 2021-11-13 | End: 2021-11-22

## 2021-11-13 NOTE — TELEPHONE ENCOUNTER
Medication refill needed. Please see refill encounter    Sanam Doe RN  [facility] Nurse Advisor  8:50 AM  11/13/2021    Reason for Disposition    [1] Caller has URGENT medication question about med that PCP or specialist prescribed AND [2] triager unable to answer question    Protocols used: MEDICATION QUESTION CALL-A-AH

## 2021-11-13 NOTE — TELEPHONE ENCOUNTER
Pt is completely out of her medication      Routing refill request to provider for review/approval because:  Drug not on the [facility] refill protocol     Last Written Prescription Date:  270  Last Fill Quantity: 270,  # refills: 3   Last office visit provider:  10/25/2021     Requested Prescriptions   Pending Prescriptions Disp Refills     gabapentin (NEURONTIN) 600 MG tablet [Pharmacy Med Name: GABAPENTIN 600MG TABLETS] 270 tablet 3     Sig: TAKE 1 TABLET(600 MG) BY MOUTH THREE TIMES DAILY       There is no refill protocol information for this order          Sanam Doe RN 11/13/21 8:09 AM

## 2021-11-13 NOTE — TELEPHONE ENCOUNTER
Per Dr Mylene CABALLERO to give a one month.    Sanam Doe RN  [facility] Nurse Advisor  8:46 AM  11/13/2021

## 2021-11-18 ENCOUNTER — PATIENT OUTREACH (OUTPATIENT)
Dept: GERIATRIC MEDICINE | Facility: CLINIC | Age: 71
End: 2021-11-18
Payer: COMMERCIAL

## 2021-11-18 NOTE — PROGRESS NOTES
[facility] Care Coordination Contact     CHW, reached out to remind mbr to schedule colon screening, diabetice eye and wellness exams. Lvm to rtn call.      KIANNA Carbajal  [facility]  [phone]

## 2021-11-22 ENCOUNTER — ANTICOAGULATION THERAPY VISIT (OUTPATIENT)
Dept: ANTICOAGULATION | Facility: CLINIC | Age: 71
End: 2021-11-22

## 2021-11-22 ENCOUNTER — OFFICE VISIT (OUTPATIENT)
Dept: FAMILY MEDICINE | Facility: CLINIC | Age: 71
End: 2021-11-22
Payer: COMMERCIAL

## 2021-11-22 VITALS
OXYGEN SATURATION: 93 % | SYSTOLIC BLOOD PRESSURE: 138 MMHG | BODY MASS INDEX: 25.96 KG/M2 | DIASTOLIC BLOOD PRESSURE: 74 MMHG | HEART RATE: 84 BPM | TEMPERATURE: 98.1 F | WEIGHT: 151.25 LBS

## 2021-11-22 DIAGNOSIS — I48.91 ATRIAL FIBRILLATION, UNSPECIFIED TYPE (H): ICD-10-CM

## 2021-11-22 DIAGNOSIS — K59.00 CONSTIPATION, UNSPECIFIED CONSTIPATION TYPE: ICD-10-CM

## 2021-11-22 DIAGNOSIS — E78.2 MIXED HYPERLIPIDEMIA: ICD-10-CM

## 2021-11-22 DIAGNOSIS — I48.91 ATRIAL FIBRILLATION (H): Primary | ICD-10-CM

## 2021-11-22 DIAGNOSIS — Z79.4 TYPE 2 DIABETES MELLITUS WITH HYPOGLYCEMIA WITHOUT COMA, WITH LONG-TERM CURRENT USE OF INSULIN (H): ICD-10-CM

## 2021-11-22 DIAGNOSIS — M54.6 TRIGGER POINT OF THORACIC REGION: ICD-10-CM

## 2021-11-22 DIAGNOSIS — E11.649 TYPE 2 DIABETES MELLITUS WITH HYPOGLYCEMIA WITHOUT COMA, WITH LONG-TERM CURRENT USE OF INSULIN (H): ICD-10-CM

## 2021-11-22 DIAGNOSIS — M79.7 FIBROMYALGIA: Primary | ICD-10-CM

## 2021-11-22 DIAGNOSIS — K27.9 PEPTIC ULCER: ICD-10-CM

## 2021-11-22 DIAGNOSIS — I10 ESSENTIAL HYPERTENSION: ICD-10-CM

## 2021-11-22 DIAGNOSIS — G89.4 CHRONIC PAIN SYNDROME: ICD-10-CM

## 2021-11-22 LAB — INR BLD: 2.2 (ref 0.9–1.1)

## 2021-11-22 PROCEDURE — 36416 COLLJ CAPILLARY BLOOD SPEC: CPT | Performed by: FAMILY MEDICINE

## 2021-11-22 PROCEDURE — 20553 NJX 1/MLT TRIGGER POINTS 3/>: CPT | Performed by: FAMILY MEDICINE

## 2021-11-22 PROCEDURE — 91300 PR COVID VAC PFIZER DIL RECON 30 MCG/0.3 ML IM: CPT | Performed by: FAMILY MEDICINE

## 2021-11-22 PROCEDURE — 0004A PR COVID VAC PFIZER DIL RECON 30 MCG/0.3 ML IM: CPT | Performed by: FAMILY MEDICINE

## 2021-11-22 PROCEDURE — 85610 PROTHROMBIN TIME: CPT | Performed by: FAMILY MEDICINE

## 2021-11-22 PROCEDURE — 99213 OFFICE O/P EST LOW 20 MIN: CPT | Mod: 25 | Performed by: FAMILY MEDICINE

## 2021-11-22 RX ORDER — OXYCODONE HYDROCHLORIDE 10 MG/1
10 TABLET ORAL EVERY 6 HOURS PRN
Qty: 120 TABLET | Refills: 0 | Status: SHIPPED | OUTPATIENT
Start: 2021-11-22 | End: 2021-12-24

## 2021-11-22 RX ORDER — GABAPENTIN 600 MG/1
600 TABLET ORAL 3 TIMES DAILY
Qty: 90 TABLET | Refills: 4 | Status: SHIPPED | OUTPATIENT
Start: 2021-11-22 | End: 2022-05-17

## 2021-11-22 NOTE — PROGRESS NOTES
ANTICOAGULATION MANAGEMENT     Mary Kay Tejada 71 year old female is on warfarin with therapeutic INR result. (Goal INR 2.0-3.0)    Recent labs: (last 7 days)     11/22/21  1000   INR 2.2*       ASSESSMENT     Source(s): Chart Review and Template       Warfarin doses taken: Warfarin taken as instructed    Diet: No new diet changes identified    New illness, injury, or hospitalization: No    Medication/supplement changes: None noted    Signs or symptoms of bleeding or clotting: No    Previous INR: Supratherapeutic    Additional findings: None     PLAN     Recommended plan for no diet, medication or health factor changes affecting INR     Dosing Instructions: Continue your current warfarin dose with next INR in 4 weeks  It has been 4 weeks since last check.       Summary  As of 11/22/2021    Full warfarin instructions:  2.5 mg every Sat; 5 mg all other days   Next INR check:  12/20/2021             Detailed voice message left for Mary Kay with dosing instructions and follow up date.     Contact [phone] to schedule and with any changes, questions or concerns.     Education provided: Importance of therapeutic range    Plan made per [facility] anticoagulation protocol    Mena Dumont RN  Anticoagulation Clinic  11/22/2021    _______________________________________________________________________     Anticoagulation Episode Summary     Current INR goal:  2.0-3.0   TTR:  67.6 % (1 y)   Target end date:  Indefinite   Send INR reminders to:  [facility]    Indications    Paroxysmal Atrial Fibrillation [I48.91]  Atrial fibrillation  unspecified type (H) [I48.91]           Comments:           Anticoagulation Care Providers     Provider Role Specialty Phone number    Cammy Bui MD Referring Family Medicine [phone]

## 2021-11-22 NOTE — PROGRESS NOTES
Assessment & Plan    1. Fibromyalgia  2. Trigger point of thoracic region    Procedures  Procedure Note - Trigger Point Injections    Consent obtained: verbal    Indication:  fibromyalgia    6 trigger points identified.  Each trigger point swabbed x 3 with Betadine swab.  Each trigger point then injected with 1.6 ml of 1% Lidocaine (no epi).    Total volume injected:  10 ml    Complications:  None, patient tolerated procedure well.    Plan:  Discussed care with patient.  RTC for further injections in 30 days prn.        3. Atrial fibrillation, unspecified type (H)  Patient with chronic atrial fib - on chronic anticoagulation therapy -followed by anticoagulation team  - INR point of care      Return in about 4 weeks (around 12/20/2021) for repeat trigger points, lab follow up for chronic medical conditions.    Chief Complaint   Patient presents with     Follow Up     Imm/Inj     Medication Refill     gabapentin      HPI  Desires trigger point injections -   Lots of pain in upper back/neck    Didn't get blood drawn today - wants orders placed for next visit     Feels pretty good  No blood in stool  No epigastric pain       Patient Active Problem List   Diagnosis     Abnormal Weight Loss     Osteoarthritis Of The Shoulder     Chronic Constipation     Onychomycosis Of The Toenails     Diarrhea     Ganglion Of The Left Wrist     Larynx Edema     Obesity     Medial Epicondylitis     Skin Lesion     Urinary Incontinence     Chronic Major Depression     Dry Eye Syndrome     Nicotine Dependence     Hypokalemia     Essential hypertension     Muscle Cramps In The Calf     Edema     Atrial flutter (H)     Lump In / On The Skin     Chronic pain syndrome     Paroxysmal Atrial Fibrillation     Fibromyalgia     Nausea     Abdominal Pain     Peptic Ulcer     Mixed hyperlipidemia     Chronic Reflux Esophagitis     Benign Adenomatous Polyp Of The Large Intestine     Vertigo     Rotator cuff tendonitis     Generalized osteoarthrosis,  involving multiple sites     Tendonitis of wrist, left     Trigger point of thoracic region     Flashers or floaters of right eye     Menopause     Tendonitis of wrist, right     Skin lesion of face     Hematoma of abdominal wall     Headache     Cervical neck pain with evidence of disc disease     Controlled substance agreement signed     Aspiration pneumonia (H)     Type 2 diabetes mellitus with hypoglycemia (H)     Candidal intertrigo     Osteoarthritis, hip, bilateral     Primary osteoarthritis of left knee     Osteoarthritis of both knees     Syncope     Opacity of lung on imaging study     Acute gastritis     Gastroenteritis     Nonrheumatic aortic valve stenosis     Bunion, left     Callus of foot     Cataract     Chronic anemia     Anemia due to blood loss, acute     Diabetic polyneuropathy associated with type 2 diabetes mellitus (H)     Upper GI bleed     Melena     Dyslipidemia     Skin lesion     Mixed stress and urge urinary incontinence     Primary osteoarthritis of both knees     Advanced directives, counseling/discussion     Chronic gastric ulcer without hemorrhage and without perforation     Atrial fibrillation, unspecified type (H)        Past Medical History:   Diagnosis Date     Anemia      Aortic stenosis      Atrial fibrillation (H)      Atrial flutter (H)      Benign neoplasm of adenomatous polyp     large intestine      Chronic constipation      Chronic pain syndrome      COPD (chronic obstructive pulmonary disease) (H)     Oxygen at night      Dependence on supplemental oxygen     Oxygen at noc, during the day as needed     Depression      Diabetes mellitus (H)      Dry eye syndrome      Fibromyalgia      Ganglion     left wrist     GERD (gastroesophageal reflux disease)      Hyperlipidemia      Hypertension      Hypokalemia      Infective otitis externa, unspecified     Created by Conversion      Larynx edema      Lung disease      Malignant neoplasm of vulva (H)     Created by Conversion  "[facility] Annotation: Apr 17 2007  8:24AM - Cammy Bui:  resection per Dr. Alfonso Mane 9/06;  Replacement Utility updated for latest IMO load     Medial epicondylitis      Onychomycosis      Osteoarthritis      Peptic ulcer      Polyneuropathy      Vulvar malignant neoplasm (H)         Current Outpatient Medications   Medication     gabapentin (NEURONTIN) 600 MG tablet     oxyCODONE IR (ROXICODONE) 10 MG tablet     ACCU-CHEK SOFTCLIX LANCETS lancets     albuterol (PROAIR HFA;PROVENTIL HFA;VENTOLIN HFA) 90 mcg/actuation inhaler     atorvastatin (LIPITOR) 20 MG tablet     BD ULTRA-FINE SHORT PEN NEEDLE 31 gauge x 5/16\" Ndle     blood-glucose meter (ONETOUCH VERIO IQ METER) Misc     budesonide-formoteroL (SYMBICORT) 160-4.5 mcg/actuation inhaler     cyclobenzaprine (FLEXERIL) 10 MG tablet     diaper,brief,adult,disposable (ADULT BRIEFS - LARGE) Misc     diltiazem ER COATED BEADS (CARDIZEM CD/CARTIA XT) 120 MG 24 hr capsule     furosemide (LASIX) 20 MG tablet     generic lancets (FINGERSTIX LANCETS)     meclizine (ANTIVERT) 25 MG tablet     metFORMIN (GLUCOPHAGE) 500 MG tablet     mometasone-formoterol (DULERA) 200-5 mcg/actuation HFAA inhaler     nystatin (NYSTOP) powder     omeprazole (PRILOSEC) 20 MG DR capsule     ONETOUCH VERIO IQ test strip     sucralfate (CARAFATE) 1 gram tablet     warfarin ANTICOAGULANT (COUMADIN) 5 MG tablet     No current facility-administered medications for this visit.        Past Surgical History:   Procedure Laterality Date     BIOPSY BREAST Right      BIOPSY BREAST Right 01/28/2015     BIOPSY BREAST Right 1/28/2015    Procedure: RIGHT BREAST BIOPSY AFTER WIRE LOCALIZATION AT 0940;  Surgeon: Renée Soriano MD;  Location: [facility] [place];  Service:      BIOPSY OF BREAST, INCISIONAL      Description: Incisional Breast Biopsy;  Recorded: 11/13/2007;  Comments: benign     C SUPRACERV ABD HYSTERECTOMY      Description: Supracervical Hysterectomy;  Proc Date: 01/01/1985;  " Comments: some cervix left!; ovaries intact; done for bleeding     COLONOSCOPY N/A 6/14/2019    Procedure: COLONOSCOPY;  Surgeon: Eduardo Mora MD;  Location: [facility] [place];  Service: Gastroenterology     ESOPHAGOSCOPY, GASTROSCOPY, DUODENOSCOPY (EGD), COMBINED N/A 11/6/2018    Procedure: ESOPHAGOGASTRODUODENOSCOPY;  Surgeon: Lit Fernando MD;  Location: [facility];  Service:      HYSTERECTOMY       JOINT REPLACEMENT Left     TKA     DE ABLATE HEART DYSRHYTHM FOCUS      Description: Catheter Ablation Atrial Fibrillation;  Recorded: 07/31/2012;  Comments: 7/24/12 PVI with Dr. Gardiner and nilay to all 5 pulm veins and CTI fl ablation line as well.        Social History     Socioeconomic History     Marital status:      Spouse name: Not on file     Number of children: Not on file     Years of education: Not on file     Highest education level: Not on file   Occupational History     Not on file   Tobacco Use     Smoking status: Current Every Day Smoker     Packs/day: 0.50     Types: Cigarettes     Smokeless tobacco: Never Used   Substance and Sexual Activity     Alcohol use: Yes     Comment: Alcoholic Drinks/day: very little     Drug use: No     Sexual activity: Not on file   Other Topics Concern     Not on file   Social History Narrative     Not on file     Social Determinants of Health     Financial Resource Strain: Not on file   Food Insecurity: Not on file   Transportation Needs: Not on file   Physical Activity: Not on file   Stress: Not on file   Social Connections: Not on file   Intimate Partner Violence: Not on file   Housing Stability: Not on file        Family History   Problem Relation Age of Onset     Heart Failure Mother      Cancer Other         paternal HX-laryngeal      Alcoholism Sister      No Known Problems Daughter      No Known Problems Maternal Grandmother      No Known Problems Maternal Grandfather      No Known Problems Paternal Grandmother      No Known Problems  Paternal Grandfather      No Known Problems Maternal Aunt      No Known Problems Paternal Aunt      Alcoholism Sister      Alcoholism Brother      Alcoholism Father      Cancer Paternal Uncle         Gastric-Alcohol     Cancer Paternal Uncle         gastric-Alcohol     Hereditary Breast and Ovarian Cancer Syndrome No family hx of      Breast Cancer No family hx of      Colon Cancer No family hx of      Endometrial Cancer No family hx of      Ovarian Cancer No family hx of         Review of Systems   Respiratory: Positive for shortness of breath (chronic, baseline).    Cardiovascular: Negative for chest pain, palpitations and leg swelling.   Gastrointestinal: Negative for abdominal pain.   Musculoskeletal: Positive for arthralgias, back pain, myalgias and neck stiffness.   All other systems reviewed and are negative.       /74   Pulse 84   Temp 98.1  F (36.7  C)   Wt 68.6 kg (151 lb 4 oz)   SpO2 93%   BMI 25.96 kg/m       Physical Exam  Constitutional:       General: She is not in acute distress.     Appearance: She is well-developed.   HENT:      Right Ear: Tympanic membrane and external ear normal.      Left Ear: Tympanic membrane and external ear normal.      Nose: Nose normal.      Mouth/Throat:      Pharynx: No oropharyngeal exudate.   Eyes:      General:         Right eye: No discharge.         Left eye: No discharge.      Conjunctiva/sclera: Conjunctivae normal.      Pupils: Pupils are equal, round, and reactive to light.   Neck:      Thyroid: No thyromegaly.      Trachea: No tracheal deviation.   Cardiovascular:      Rate and Rhythm: Normal rate and regular rhythm.      Pulses: Normal pulses.      Heart sounds: Normal heart sounds, S1 normal and S2 normal. No murmur heard.  No friction rub. No S3 or S4 sounds.    Pulmonary:      Effort: Pulmonary effort is normal. No respiratory distress.      Breath sounds: Normal breath sounds. No wheezing or rales.   Abdominal:      General: Bowel sounds are  "normal.      Palpations: Abdomen is soft. There is no mass.      Tenderness: There is no abdominal tenderness.   Musculoskeletal:         General: Deformity (DJD deformity at small joints, knees, ) present. Normal range of motion.      Cervical back: Neck supple.   Lymphadenopathy:      Cervical: No cervical adenopathy.   Skin:     General: Skin is warm and dry.      Findings: No rash.   Neurological:      Mental Status: She is alert and oriented to person, place, and time.      Motor: No abnormal muscle tone.      Deep Tendon Reflexes: Reflexes are normal and symmetric.   Psychiatric:         Thought Content: Thought content normal.         Judgment: Judgment normal.          Results:  Results for orders placed or performed in visit on 11/22/21   INR point of care     Status: Abnormal   Result Value Ref Range    INR 2.2 (H) 0.9 - 1.1    Narrative    This test is intended for monitoring Coumadin therapy. Results are not accurate in patients with prolonged INR due to factor deficiency.       Medications at Conclusion of Visit:  Current Outpatient Medications   Medication Sig Dispense Refill     gabapentin (NEURONTIN) 600 MG tablet Take 1 tablet (600 mg) by mouth 3 times daily 90 tablet 4     oxyCODONE IR (ROXICODONE) 10 MG tablet Take 1 tablet (10 mg) by mouth every 6 hours as needed for moderate to severe pain 120 tablet 0     ACCU-CHEK SOFTCLIX LANCETS lancets [ACCU-CHEK SOFTCLIX LANCETS LANCETS] TEST THREE TIMES DAILY 300 each 3     albuterol (PROAIR HFA;PROVENTIL HFA;VENTOLIN HFA) 90 mcg/actuation inhaler [ALBUTEROL (PROAIR HFA;PROVENTIL HFA;VENTOLIN HFA) 90 MCG/ACTUATION INHALER] INHALE 2 PUFFS EVERY 4 HOURS AS NEEDED WHEEZING 90 g 11     atorvastatin (LIPITOR) 20 MG tablet [ATORVASTATIN (LIPITOR) 20 MG TABLET] TAKE 1 TABLET(20 MG) BY MOUTH AT BEDTIME 90 tablet 3     BD ULTRA-FINE SHORT PEN NEEDLE 31 gauge x 5/16\" Ndle [BD ULTRA-FINE SHORT PEN NEEDLE 31 GAUGE X 5/16\" NDLE] TEST FOUR TIMES DAILY WITH MEALS AND AT " BEDTIME 400 each 3     blood-glucose meter (ONETOUCH VERIO IQ METER) Misc [BLOOD-GLUCOSE METER (ONETOUCH VERIO IQ METER) MISC] Check blood sugar three times a day. 1 each 0     budesonide-formoteroL (SYMBICORT) 160-4.5 mcg/actuation inhaler [BUDESONIDE-FORMOTEROL (SYMBICORT) 160-4.5 MCG/ACTUATION INHALER] Inhale 2 puffs 2 (two) times a day. Inhale 2 puff by mouth twice daily 1 Inhaler 5     cyclobenzaprine (FLEXERIL) 10 MG tablet Take 1 tablet (10 mg) by mouth nightly as needed for muscle spasms 30 tablet 0     diaper,brief,adult,disposable (ADULT BRIEFS - LARGE) Misc [DIAPER,BRIEF,ADULT,DISPOSABLE (ADULT BRIEFS - LARGE) MISC] Use 3-4 daily as needed for incontinence 120 each 6     diltiazem ER COATED BEADS (CARDIZEM CD/CARTIA XT) 120 MG 24 hr capsule Take 1 capsule (120 mg) by mouth daily 90 capsule 1     furosemide (LASIX) 20 MG tablet Take 1 tablet (20 mg) by mouth daily as needed 90 tablet 3     generic lancets (FINGERSTIX LANCETS) [GENERIC LANCETS (FINGERSTIX LANCETS)] Dispense brand per patient's insurance at pharmacy discretion. 300 each 0     meclizine (ANTIVERT) 25 MG tablet Take 1 tablet (25 mg) by mouth 3 times daily as needed for dizziness or nausea 45 tablet 5     metFORMIN (GLUCOPHAGE) 500 MG tablet TAKE 1 TABLET(500 MG) BY MOUTH TWICE DAILY WITH MEALS 180 tablet 1     mometasone-formoterol (DULERA) 200-5 mcg/actuation HFAA inhaler [MOMETASONE-FORMOTEROL (DULERA) 200-5 MCG/ACTUATION HFAA INHALER] Inhale 2 puffs 2 (two) times a day. 1 Inhaler 5     nystatin (NYSTOP) powder [NYSTATIN (NYSTOP) POWDER] Apply 1 application topically 2 (two) times a day as needed. 2-3 times to affected area(s). 60 g 3     omeprazole (PRILOSEC) 20 MG DR capsule TAKE 1 CAPSULE(20 MG) BY MOUTH DAILY BEFORE BREAKFAST 30 capsule 3     ONETOUCH VERIO IQ test strip USE 1 EACH AS DIRECTED THREE TIMES DAILY AS NEEDED 300 strip 3     sucralfate (CARAFATE) 1 gram tablet [SUCRALFATE (CARAFATE) 1 GRAM TABLET] TAKE 1 TABLET BY MOUTH FOUR  TIMES DAILY(CRUSH TABLET AND MIX IT WITH A LITTLE WATER, THEN SWALLOW) 120 tablet 12     warfarin ANTICOAGULANT (COUMADIN) 5 MG tablet Take 1/2-1 tablet (2.5-5 mg) daily as directed. Adjust dose per INR results. 90 tablet 1         SAVANA SILVER MD

## 2021-11-28 ASSESSMENT — ENCOUNTER SYMPTOMS
MYALGIAS: 1
PALPITATIONS: 0
BACK PAIN: 1
NECK STIFFNESS: 1
ABDOMINAL PAIN: 0
ARTHRALGIAS: 1
SHORTNESS OF BREATH: 1

## 2021-12-08 DIAGNOSIS — R60.0 LOCALIZED EDEMA: ICD-10-CM

## 2021-12-08 DIAGNOSIS — B37.2 CANDIDAL INTERTRIGO: Primary | ICD-10-CM

## 2021-12-08 DIAGNOSIS — F17.200 TOBACCO USE DISORDER: ICD-10-CM

## 2021-12-08 DIAGNOSIS — D50.0 IRON DEFICIENCY ANEMIA DUE TO CHRONIC BLOOD LOSS: ICD-10-CM

## 2021-12-09 DIAGNOSIS — J20.9 ACUTE BRONCHITIS, UNSPECIFIED ORGANISM: Primary | ICD-10-CM

## 2021-12-09 RX ORDER — FUROSEMIDE 20 MG
TABLET ORAL
Qty: 90 TABLET | Refills: 3 | OUTPATIENT
Start: 2021-12-09

## 2021-12-09 RX ORDER — SUCRALFATE 1 G/1
TABLET ORAL
Qty: 360 TABLET | Refills: 3 | Status: SHIPPED | OUTPATIENT
Start: 2021-12-09 | End: 2023-01-24

## 2021-12-09 RX ORDER — NICOTINE 21 MG/24HR
PATCH, TRANSDERMAL 24 HOURS TRANSDERMAL
Qty: 28 PATCH | OUTPATIENT
Start: 2021-12-09

## 2021-12-09 RX ORDER — NYSTATIN 100000 [USP'U]/G
POWDER TOPICAL
Qty: 60 G | Refills: 3 | Status: SHIPPED | OUTPATIENT
Start: 2021-12-09 | End: 2022-03-26

## 2021-12-09 NOTE — TELEPHONE ENCOUNTER
"Outpatient Medication Detail     Disp Refills Start End IQRA   nystatin (NYSTOP) powder 60 g 3 7/21/2020  No   Sig - Route: Apply 1 application topically 2 (two) times a day as needed. 2-3 times to affected area(s). - Topical   Sent to pharmacy as: nystatin 100,000 unit/gram topical powder (Nystop)   E-Prescribing Status: Receipt confirmed by pharmacy (7/21/2020  9:31 AM CDT)     Last Written Prescription Date:  12/4/20  Last Fill Quantity: 120,  # refills: 12   Last office visit provider:  11/22/21    Requested Prescriptions   Pending Prescriptions Disp Refills     NYSTOP 296318 UNIT/GM powder [Pharmacy Med Name: NYSTOP TOP PWDR 100,000 60GM] 60 g      Sig: APPLY TOPICALLY TO THE AFFECTED AREA TWICE DAILY- THREE TIMES DAILY AS NEEDED       Antifungal Agents Passed - 12/8/2021  8:48 AM        Passed - Recent (12 mo) or future (30 days) visit within the authorizing provider's specialty     Patient has had an office visit with the authorizing provider or a provider within the authorizing providers department within the previous 12 mos or has a future within next 30 days. See \"Patient Info\" tab in inbasket, or \"Choose Columns\" in Meds & Orders section of the refill encounter.              Passed - Not Fluconazole or Terconazole      If oral Fluconazole or Terconazole, may refill if indicated in progress notes.           Passed - Medication is active on med list           nicotine (NICODERM CQ) 21 MG/24HR 24 hr patch [Pharmacy Med Name: NICOTINE 21MG/24H PATCH 14S] 28 patch      Sig: APPLY 1 PATCH EXTERNALLY TO THE SKIN DAILY       Partial Cholinergic Nicotinic Agonist Agents Failed - 12/8/2021  8:48 AM        Failed - Medication is active on med list        Passed - Blood pressure under 140/90 in past 12 months     BP Readings from Last 3 Encounters:   11/22/21 138/74   10/25/21 130/80   09/27/21 133/74                 Passed - Recent (12 mo) or future (30 days) visit within the authorizing provider's specialty     " "Patient has had an office visit with the authorizing provider or a provider within the authorizing providers department within the previous 12 mos or has a future within next 30 days. See \"Patient Info\" tab in inbasket, or \"Choose Columns\" in Meds & Orders section of the refill encounter.              Passed - Patient is 18 years of age or older        Passed - Patient is not pregnant        Passed - No positive pregnancy test on file in past 12 months           sucralfate (CARAFATE) 1 GM tablet [Pharmacy Med Name: SUCRALFATE 1GM TABLETS] 120 tablet 12     Sig: TAKE 1 TABLET BY MOUTH FOUR TIMES DAILY(CRUSH TABLET AND MIX IT WITH A LITTLE WATER, THEN SWALLOW)       Miscellaneous Gastrointestinal Agents Passed - 12/8/2021  8:48 AM        Passed - Recent (12 mo) or future (30 days) visit within the authorizing provider's specialty     Patient has had an office visit with the authorizing provider or a provider within the authorizing providers department within the previous 12 mos or has a future within next 30 days. See \"Patient Info\" tab in inTopsy Labset, or \"Choose Columns\" in Meds & Orders section of the refill encounter.              Passed - Medication is active on med list        Passed - Patient is 18 years of age or older         Refused Prescriptions Disp Refills     nicotine (NICODERM CQ) 21 MG/24HR 24 hr patch [Pharmacy Med Name: NICOTINE 21MG/24H PATCH 14S] 28 patch      Sig: APPLY 1 PATCH TOPICALLY TO THE SKIN DAILY       Partial Cholinergic Nicotinic Agonist Agents Failed - 12/8/2021  8:48 AM        Failed - Medication is active on med list        Passed - Blood pressure under 140/90 in past 12 months     BP Readings from Last 3 Encounters:   11/22/21 138/74   10/25/21 130/80   09/27/21 133/74                 Passed - Recent (12 mo) or future (30 days) visit within the authorizing provider's specialty     Patient has had an office visit with the authorizing provider or a provider within the authorizing " "providers department within the previous 12 mos or has a future within next 30 days. See \"Patient Info\" tab in inbasket, or \"Choose Columns\" in Meds & Orders section of the refill encounter.              Passed - Patient is 18 years of age or older        Passed - Patient is not pregnant        Passed - No positive pregnancy test on file in past 12 months           furosemide (LASIX) 20 MG tablet [Pharmacy Med Name: FUROSEMIDE 20MG TABLETS] 90 tablet 3     Sig: TAKE 1 TABLET(20 MG) BY MOUTH DAILY AS NEEDED       Diuretics (Including Combos) Protocol Passed - 12/8/2021  8:48 AM        Passed - Blood pressure under 140/90 in past 12 months     BP Readings from Last 3 Encounters:   11/22/21 138/74   10/25/21 130/80   09/27/21 133/74                 Passed - Recent (12 mo) or future (30 days) visit within the authorizing provider's specialty     Patient has had an office visit with the authorizing provider or a provider within the authorizing providers department within the previous 12 mos or has a future within next 30 days. See \"Patient Info\" tab in inbasket, or \"Choose Columns\" in Meds & Orders section of the refill encounter.              Passed - Medication is active on med list        Passed - Patient is age 18 or older        Passed - No active pregancy on record        Passed - Normal serum creatinine on file in past 12 months     Recent Labs   Lab Test 07/21/21  0911   CR 0.66              Passed - Normal serum potassium on file in past 12 months     Recent Labs   Lab Test 07/21/21  0911   POTASSIUM 4.2                    Passed - Normal serum sodium on file in past 12 months     Recent Labs   Lab Test 07/21/21  0911                 Passed - No positive pregnancy test in past 12 months             Quincy Dang RN 12/09/21 12:45 PM  "

## 2021-12-09 NOTE — TELEPHONE ENCOUNTER
"Outpatient Medication Detail     Disp Refills Start End IQRA   nicotine (NICODERM CQ) 21 mg/24 hr 30 patch 1 4/26/2021  No   Sig - Route: Place 1 patch on the skin daily. - Transdermal   Sent to pharmacy as: nicotine 21 mg/24 hr daily transdermal patch (NICODERM CQ)   Notes to Pharmacy: Needs sensitive skin patch   E-Prescribing Status: Receipt confirmed by pharmacy (4/26/2021  9:17 AM CDT)       nicotine (NICODERM CQ) 21 mg/24 hr [[id number]]    Electronically signed by: Cammy Bui MD on 04/26/21 0917 Status: Active   Ordering user: Cammy Bui MD 04/26/21 0917 Authorized by: Cammy Bui MD   Frequency: Q24H 04/26/21 - Until Discontinued   Diagnoses  Tobacco use disorder [F17.200]   Medication comments: Needs sensitive skin patch     Routing refill request to provider for review/approval because:  A break in medication    Last office visit provider:  11/22/21     Requested Prescriptions   Pending Prescriptions Disp Refills     nicotine (NICODERM CQ) 21 MG/24HR 24 hr patch [Pharmacy Med Name: NICOTINE 21MG/24H PATCH 14S] 28 patch      Sig: APPLY 1 PATCH EXTERNALLY TO THE SKIN DAILY       Partial Cholinergic Nicotinic Agonist Agents Failed - 12/8/2021  8:48 AM        Failed - Medication is active on med list        Passed - Blood pressure under 140/90 in past 12 months     BP Readings from Last 3 Encounters:   11/22/21 138/74   10/25/21 130/80   09/27/21 133/74                 Passed - Recent (12 mo) or future (30 days) visit within the authorizing provider's specialty     Patient has had an office visit with the authorizing provider or a provider within the authorizing providers department within the previous 12 mos or has a future within next 30 days. See \"Patient Info\" tab in inbasket, or \"Choose Columns\" in Meds & Orders section of the refill encounter.              Passed - Patient is 18 years of age or older        Passed - Patient is not pregnant        Passed " "- No positive pregnancy test on file in past 12 months         Signed Prescriptions Disp Refills    NYSTOP [id number] UNIT/GM powder 60 g 3     Sig: APPLY TOPICALLY TO THE AFFECTED AREA TWICE DAILY- THREE TIMES DAILY AS NEEDED       Antifungal Agents Passed - 12/8/2021  8:48 AM        Passed - Recent (12 mo) or future (30 days) visit within the authorizing provider's specialty     Patient has had an office visit with the authorizing provider or a provider within the authorizing providers department within the previous 12 mos or has a future within next 30 days. See \"Patient Info\" tab in inbasket, or \"Choose Columns\" in Meds & Orders section of the refill encounter.              Passed - Not Fluconazole or Terconazole      If oral Fluconazole or Terconazole, may refill if indicated in progress notes.           Passed - Medication is active on med list          sucralfate (CARAFATE) 1 GM tablet 360 tablet 3     Sig: TAKE 1 TABLET BY MOUTH FOUR TIMES DAILY(CRUSH TABLET AND MIX IT WITH A LITTLE WATER, THEN SWALLOW)       Miscellaneous Gastrointestinal Agents Passed - 12/8/2021  8:48 AM        Passed - Recent (12 mo) or future (30 days) visit within the authorizing provider's specialty     Patient has had an office visit with the authorizing provider or a provider within the authorizing providers department within the previous 12 mos or has a future within next 30 days. See \"Patient Info\" tab in inbasket, or \"Choose Columns\" in Meds & Orders section of the refill encounter.              Passed - Medication is active on med list        Passed - Patient is 18 years of age or older         Refused Prescriptions Disp Refills     nicotine (NICODERM CQ) 21 MG/24HR 24 hr patch [Pharmacy Med Name: NICOTINE 21MG/24H PATCH 14S] 28 patch      Sig: APPLY 1 PATCH TOPICALLY TO THE SKIN DAILY       Partial Cholinergic Nicotinic Agonist Agents Failed - 12/8/2021  8:48 AM        Failed - Medication is active on med list        Passed - Blood " "pressure under 140/90 in past 12 months     BP Readings from Last 3 Encounters:   11/22/21 138/74   10/25/21 130/80   09/27/21 133/74                 Passed - Recent (12 mo) or future (30 days) visit within the authorizing provider's specialty     Patient has had an office visit with the authorizing provider or a provider within the authorizing providers department within the previous 12 mos or has a future within next 30 days. See \"Patient Info\" tab in inbasket, or \"Choose Columns\" in Meds & Orders section of the refill encounter.              Passed - Patient is 18 years of age or older        Passed - Patient is not pregnant        Passed - No positive pregnancy test on file in past 12 months           furosemide (LASIX) 20 MG tablet [Pharmacy Med Name: FUROSEMIDE 20MG TABLETS] 90 tablet 3     Sig: TAKE 1 TABLET(20 MG) BY MOUTH DAILY AS NEEDED       Diuretics (Including Combos) Protocol Passed - 12/8/2021  8:48 AM        Passed - Blood pressure under 140/90 in past 12 months     BP Readings from Last 3 Encounters:   11/22/21 138/74   10/25/21 130/80   09/27/21 133/74                 Passed - Recent (12 mo) or future (30 days) visit within the authorizing provider's specialty     Patient has had an office visit with the authorizing provider or a provider within the authorizing providers department within the previous 12 mos or has a future within next 30 days. See \"Patient Info\" tab in inbasket, or \"Choose Columns\" in Meds & Orders section of the refill encounter.              Passed - Medication is active on med list        Passed - Patient is age 18 or older        Passed - No active pregancy on record        Passed - Normal serum creatinine on file in past 12 months     Recent Labs   Lab Test 07/21/21  0911   CR 0.66              Passed - Normal serum potassium on file in past 12 months     Recent Labs   Lab Test 07/21/21  0911   POTASSIUM 4.2                    Passed - Normal serum sodium on file in past 12 " months     Recent Labs   Lab Test 07/21/21  0911                 Passed - No positive pregnancy test in past 12 months             Quincy Dang RN 12/09/21 12:49 PM

## 2021-12-10 NOTE — TELEPHONE ENCOUNTER
Requested medication does not match active patient list.    Outpatient Medication Detail     Disp Refills Start End IQRA   mometasone-formoterol (DULERA) 200-5 mcg/actuation HFAA inhaler 1 Inhaler 5 11/11/2020  No   Sig - Route: [MOMETASONE-FORMOTEROL (DULERA) 200-5 MCG/ACTUATION HFAA INHALER] Inhale 2 puffs 2 (two) times a day. - Inhalation   Class: Normal   Order: [id number]

## 2021-12-13 DIAGNOSIS — Z79.4 TYPE 2 DIABETES MELLITUS WITH HYPOGLYCEMIA WITHOUT COMA, WITH LONG-TERM CURRENT USE OF INSULIN (H): ICD-10-CM

## 2021-12-13 DIAGNOSIS — E11.649 TYPE 2 DIABETES MELLITUS WITH HYPOGLYCEMIA WITHOUT COMA, WITH LONG-TERM CURRENT USE OF INSULIN (H): ICD-10-CM

## 2021-12-14 RX ORDER — GABAPENTIN 600 MG/1
TABLET ORAL
Qty: 90 TABLET | Refills: 4 | OUTPATIENT
Start: 2021-12-14

## 2021-12-14 RX ORDER — NICOTINE 21 MG/24HR
PATCH, TRANSDERMAL 24 HOURS TRANSDERMAL
Qty: 28 PATCH | Refills: 0 | Status: SHIPPED | OUTPATIENT
Start: 2021-12-14 | End: 2022-03-26

## 2021-12-14 NOTE — PROGRESS NOTES
[facility] Care Coordination Contact    Faxed signed pca sig page to provider.     Prashant Vargas  Care Management Specialist  [facility]  [phone]

## 2021-12-14 NOTE — TELEPHONE ENCOUNTER
Patient has refills on file.    Last Written Prescription Date:  11/22/2021  Last Fill Quantity: 90,  # refills: 4   Last office visit provider:  11/22/2021 with Dr Brizuela.     Requested Prescriptions   Pending Prescriptions Disp Refills     gabapentin (NEURONTIN) 600 MG tablet [Pharmacy Med Name: GABAPENTIN 600MG TABLETS] 90 tablet 4     Sig: TAKE 1 TABLET(600 MG) BY MOUTH THREE TIMES DAILY       There is no refill protocol information for this order          Keyanna Cason 12/14/21 5:25 PM

## 2021-12-20 ENCOUNTER — TELEPHONE (OUTPATIENT)
Dept: FAMILY MEDICINE | Facility: CLINIC | Age: 71
End: 2021-12-20

## 2021-12-20 NOTE — TELEPHONE ENCOUNTER
Reason for Call:  Other appointment    Detailed comments: would like to get worked in to see Dr. Brizuela next week- any day will work for her the week of December 27th- she needs a follow up and an injection     Phone Number Patient can be reached at: Cell number on file:    Telephone Information:   Mobile [phone]       Best Time: any    Can we leave a detailed message on this number? YES    Call taken on 12/20/2021 at 7:23 AM by Nataliia Haddad

## 2021-12-22 ENCOUNTER — MEDICAL CORRESPONDENCE (OUTPATIENT)
Dept: HEALTH INFORMATION MANAGEMENT | Facility: CLINIC | Age: 71
End: 2021-12-22
Payer: COMMERCIAL

## 2021-12-24 ENCOUNTER — OFFICE VISIT (OUTPATIENT)
Dept: FAMILY MEDICINE | Facility: CLINIC | Age: 71
End: 2021-12-24
Payer: COMMERCIAL

## 2021-12-24 ENCOUNTER — ANTICOAGULATION THERAPY VISIT (OUTPATIENT)
Dept: ANTICOAGULATION | Facility: CLINIC | Age: 71
End: 2021-12-24

## 2021-12-24 VITALS
HEART RATE: 69 BPM | TEMPERATURE: 98.1 F | SYSTOLIC BLOOD PRESSURE: 126 MMHG | OXYGEN SATURATION: 95 % | DIASTOLIC BLOOD PRESSURE: 73 MMHG

## 2021-12-24 DIAGNOSIS — K27.9 PEPTIC ULCER: ICD-10-CM

## 2021-12-24 DIAGNOSIS — E78.2 MIXED HYPERLIPIDEMIA: ICD-10-CM

## 2021-12-24 DIAGNOSIS — M79.7 FIBROMYALGIA: Primary | ICD-10-CM

## 2021-12-24 DIAGNOSIS — K59.00 CONSTIPATION, UNSPECIFIED CONSTIPATION TYPE: ICD-10-CM

## 2021-12-24 DIAGNOSIS — I48.91 ATRIAL FIBRILLATION (H): Primary | ICD-10-CM

## 2021-12-24 DIAGNOSIS — Z79.4 TYPE 2 DIABETES MELLITUS WITH HYPOGLYCEMIA WITHOUT COMA, WITH LONG-TERM CURRENT USE OF INSULIN (H): ICD-10-CM

## 2021-12-24 DIAGNOSIS — I10 ESSENTIAL HYPERTENSION: ICD-10-CM

## 2021-12-24 DIAGNOSIS — M54.6 TRIGGER POINT OF THORACIC REGION: ICD-10-CM

## 2021-12-24 DIAGNOSIS — I48.91 ATRIAL FIBRILLATION, UNSPECIFIED TYPE (H): ICD-10-CM

## 2021-12-24 DIAGNOSIS — G89.4 CHRONIC PAIN SYNDROME: ICD-10-CM

## 2021-12-24 DIAGNOSIS — E11.649 TYPE 2 DIABETES MELLITUS WITH HYPOGLYCEMIA WITHOUT COMA, WITH LONG-TERM CURRENT USE OF INSULIN (H): ICD-10-CM

## 2021-12-24 LAB
ALBUMIN SERPL-MCNC: 3.6 G/DL (ref 3.5–5)
ALP SERPL-CCNC: 120 U/L (ref 45–120)
ALT SERPL W P-5'-P-CCNC: 11 U/L (ref 0–45)
ANION GAP SERPL CALCULATED.3IONS-SCNC: 14 MMOL/L (ref 5–18)
AST SERPL W P-5'-P-CCNC: 15 U/L (ref 0–40)
BILIRUB SERPL-MCNC: 0.3 MG/DL (ref 0–1)
BUN SERPL-MCNC: 13 MG/DL (ref 8–28)
CALCIUM SERPL-MCNC: 9.6 MG/DL (ref 8.5–10.5)
CHLORIDE BLD-SCNC: 102 MMOL/L (ref 98–107)
CHOLEST SERPL-MCNC: 171 MG/DL
CO2 SERPL-SCNC: 28 MMOL/L (ref 22–31)
CREAT SERPL-MCNC: 0.64 MG/DL (ref 0.6–1.1)
ERYTHROCYTE [DISTWIDTH] IN BLOOD BY AUTOMATED COUNT: 18.3 % (ref 10–15)
FASTING STATUS PATIENT QL REPORTED: NORMAL
GFR SERPL CREATININE-BSD FRML MDRD: >90 ML/MIN/1.73M2
GLUCOSE BLD-MCNC: 70 MG/DL (ref 70–125)
HBA1C MFR BLD: 6.1 % (ref 0–5.6)
HCT VFR BLD AUTO: 37 % (ref 35–47)
HDLC SERPL-MCNC: 81 MG/DL
HGB BLD-MCNC: 11 G/DL (ref 11.7–15.7)
INR BLD: 3.1 (ref 0.9–1.1)
LDLC SERPL CALC-MCNC: 77 MG/DL
MCH RBC QN AUTO: 24.6 PG (ref 26.5–33)
MCHC RBC AUTO-ENTMCNC: 29.7 G/DL (ref 31.5–36.5)
MCV RBC AUTO: 83 FL (ref 78–100)
PLATELET # BLD AUTO: 254 10E3/UL (ref 150–450)
POTASSIUM BLD-SCNC: 3.9 MMOL/L (ref 3.5–5)
PROT SERPL-MCNC: 7 G/DL (ref 6–8)
RBC # BLD AUTO: 4.47 10E6/UL (ref 3.8–5.2)
SODIUM SERPL-SCNC: 144 MMOL/L (ref 136–145)
TRIGL SERPL-MCNC: 65 MG/DL
TSH SERPL DL<=0.005 MIU/L-ACNC: 1.4 UIU/ML (ref 0.3–5)
WBC # BLD AUTO: 7.2 10E3/UL (ref 4–11)

## 2021-12-24 PROCEDURE — 80053 COMPREHEN METABOLIC PANEL: CPT | Performed by: FAMILY MEDICINE

## 2021-12-24 PROCEDURE — 99214 OFFICE O/P EST MOD 30 MIN: CPT | Mod: 25 | Performed by: FAMILY MEDICINE

## 2021-12-24 PROCEDURE — 84443 ASSAY THYROID STIM HORMONE: CPT | Performed by: FAMILY MEDICINE

## 2021-12-24 PROCEDURE — 36415 COLL VENOUS BLD VENIPUNCTURE: CPT | Performed by: FAMILY MEDICINE

## 2021-12-24 PROCEDURE — 85027 COMPLETE CBC AUTOMATED: CPT | Performed by: FAMILY MEDICINE

## 2021-12-24 PROCEDURE — 83036 HEMOGLOBIN GLYCOSYLATED A1C: CPT | Performed by: FAMILY MEDICINE

## 2021-12-24 PROCEDURE — 80061 LIPID PANEL: CPT | Performed by: FAMILY MEDICINE

## 2021-12-24 PROCEDURE — 85610 PROTHROMBIN TIME: CPT | Performed by: FAMILY MEDICINE

## 2021-12-24 RX ORDER — OXYCODONE HYDROCHLORIDE 10 MG/1
10 TABLET ORAL EVERY 6 HOURS PRN
Qty: 120 TABLET | Refills: 0 | Status: SHIPPED | OUTPATIENT
Start: 2021-12-24 | End: 2022-01-21

## 2021-12-24 NOTE — PROGRESS NOTES
Assessment & Plan    1. Fibromyalgia  2. Trigger point of thoracic region  3. Chronic pain syndrome  This is a 72 yo female with chronic pain syndrome related to fibromyalgia (and rheumatoid arthritis).  Gets monthly trigger point injections for pain control - done today.  Also takes Oxycodone as needed - unable to use NSAIDs due to h/o GI bleeding   - lidocaine 1 % 10 mL  - oxyCODONE IR (ROXICODONE) 10 MG tablet; Take 1 tablet (10 mg) by mouth every 6 hours as needed for moderate to severe pain  Dispense: 120 tablet; Refill: 0    4. Type 2 diabetes mellitus with hypoglycemia without coma, with long-term current use of insulin (H)  Patient due for monitoring of type II DM - A1c ordered   - Hemoglobin A1c    5. Mixed hyperlipidemia  On statin therapy - check lipids    - Lipid Profile (Chol, Trig, HDL, LDL calc)  - Comprehensive metabolic panel (BMP + Alb, Alk Phos, ALT, AST, Total. Bili, TP)    6. Essential hypertension  Blood pressure is controlled - continue same meds - check labs  - Comprehensive metabolic panel (BMP + Alb, Alk Phos, ALT, AST, Total. Bili, TP)    7. Constipation, unspecified constipation type  Patient has chronic constipation - likely diet/dehydration; but check TSH  - TSH    8. Peptic ulcer  H/o peptic ulcer disease with bleeding - check hemogram  - CBC with platelets    9. Atrial fibrillation, unspecified type (H)  On chronic anticoagulation therapy - check INR today   - INR point of care    No follow-ups on file.    Chief Complaint   Patient presents with     Injections      [facility]    Procedure: MSK: Inject Trigger Point, 1 or 2    Date/Time: 12/24/2021 8:12 AM  Performed by: Cammy Bui MD  Authorized by: Cammy Bui MD       UNIVERSAL PROTOCOL   Site Marked: Yes  Prior Images Obtained and Reviewed:  No  Required items: Required blood products, implants, devices and special equipment available (no special products)     Patient identity confirmed:  Verbally with patient  NA - No sedation, light sedation, or local anesthesia  Confirmation Checklist:  Correct equipment/implants were available  Time out: Immediately prior to the procedure a time out was called    Universal Protocol: the Joint Commission Universal Protocol was followed    Preparation: Patient was prepped and draped in usual sterile fashion       ANESTHESIA    Local Anesthetic: Lidocaine 1% without epinephrine      SEDATION    Patient Sedated: No      PROCEDURE  Describe Procedure: 6 trigger points injected, each with 1.6 ml of Lidocaine - in bilateral upper thoracic/suprascapular region - no complications - total of 10 ml  Patient Tolerance:  Patient tolerated the procedure well with no immediate complications  Length of time physician/provider present for 1:1 monitoring during sedation: 0        Patient Active Problem List   Diagnosis     Abnormal Weight Loss     Osteoarthritis Of The Shoulder     Chronic Constipation     Onychomycosis Of The Toenails     Diarrhea     Ganglion Of The Left Wrist     Larynx Edema     Obesity     Medial Epicondylitis     Skin Lesion     Urinary Incontinence     Chronic Major Depression     Dry Eye Syndrome     Nicotine Dependence     Hypokalemia     Essential hypertension     Muscle Cramps In The Calf     Edema     Atrial flutter (H)     Lump In / On The Skin     Chronic pain syndrome     Paroxysmal Atrial Fibrillation     Fibromyalgia     Nausea     Abdominal Pain     Peptic Ulcer     Mixed hyperlipidemia     Chronic Reflux Esophagitis     Benign Adenomatous Polyp Of The Large Intestine     Vertigo     Rotator cuff tendonitis     Generalized osteoarthrosis, involving multiple sites     Tendonitis of wrist, left     Trigger point of thoracic region     Flashers or floaters of right eye     Menopause     Tendonitis of wrist, right     Skin lesion of face     Hematoma of abdominal wall     Headache     Cervical neck pain with evidence of  disc disease     Controlled substance agreement signed     Aspiration pneumonia (H)     Type 2 diabetes mellitus with hypoglycemia (H)     Candidal intertrigo     Osteoarthritis, hip, bilateral     Primary osteoarthritis of left knee     Osteoarthritis of both knees     Syncope     Opacity of lung on imaging study     Acute gastritis     Gastroenteritis     Nonrheumatic aortic valve stenosis     Bunion, left     Callus of foot     Cataract     Chronic anemia     Anemia due to blood loss, acute     Diabetic polyneuropathy associated with type 2 diabetes mellitus (H)     Upper GI bleed     Melena     Dyslipidemia     Skin lesion     Mixed stress and urge urinary incontinence     Primary osteoarthritis of both knees     Advanced directives, counseling/discussion     Chronic gastric ulcer without hemorrhage and without perforation     Atrial fibrillation, unspecified type (H)        Past Medical History:   Diagnosis Date     Anemia      Aortic stenosis      Atrial fibrillation (H)      Atrial flutter (H)      Benign neoplasm of adenomatous polyp     large intestine      Chronic constipation      Chronic pain syndrome      COPD (chronic obstructive pulmonary disease) (H)     Oxygen at night      Dependence on supplemental oxygen     Oxygen at noc, during the day as needed     Depression      Diabetes mellitus (H)      Dry eye syndrome      Fibromyalgia      Ganglion     left wrist     GERD (gastroesophageal reflux disease)      Hyperlipidemia      Hypertension      Hypokalemia      Infective otitis externa, unspecified     Created by Conversion      Larynx edema      Lung disease      Malignant neoplasm of vulva (H)     Created by Conversion [facility] Annotation: Apr 17 2007  8:24AM - Cammy Bui:  resection per Dr. Alfonso Mane 9/06;  Replacement Utility updated for latest IMO load     Medial epicondylitis      Onychomycosis      Osteoarthritis      Peptic ulcer      Polyneuropathy      Vulvar malignant  "neoplasm (H)         Current Outpatient Medications   Medication     ACCU-CHEK SOFTCLIX LANCETS lancets     albuterol (PROAIR HFA;PROVENTIL HFA;VENTOLIN HFA) 90 mcg/actuation inhaler     atorvastatin (LIPITOR) 20 MG tablet     BD ULTRA-FINE SHORT PEN NEEDLE 31 gauge x 5/16\" Ndle     blood-glucose meter (ONETOUCH VERIO IQ METER) Misc     budesonide-formoteroL (SYMBICORT) 160-4.5 mcg/actuation inhaler     cyclobenzaprine (FLEXERIL) 10 MG tablet     diaper,brief,adult,disposable (ADULT BRIEFS - LARGE) Misc     diltiazem ER COATED BEADS (CARDIZEM CD/CARTIA XT) 120 MG 24 hr capsule     furosemide (LASIX) 20 MG tablet     gabapentin (NEURONTIN) 600 MG tablet     generic lancets (FINGERSTIX LANCETS)     meclizine (ANTIVERT) 25 MG tablet     metFORMIN (GLUCOPHAGE) 500 MG tablet     mometasone-formoterol (DULERA) 200-5 mcg/actuation HFAA inhaler     nicotine (NICODERM CQ) 21 MG/24HR 24 hr patch     nystatin (NYSTOP) powder     NYSTOP [id number] UNIT/GM powder     omeprazole (PRILOSEC) 20 MG DR capsule     ONETOUCH VERIO IQ test strip     oxyCODONE IR (ROXICODONE) 10 MG tablet     sucralfate (CARAFATE) 1 GM tablet     warfarin ANTICOAGULANT (COUMADIN) 5 MG tablet     No current facility-administered medications for this visit.        Past Surgical History:   Procedure Laterality Date     BIOPSY BREAST Right      BIOPSY BREAST Right 01/28/2015     BIOPSY BREAST Right 1/28/2015    Procedure: RIGHT BREAST BIOPSY AFTER WIRE LOCALIZATION AT 0940;  Surgeon: Renée Soriano MD;  [place] [facility] [place]:      BIOPSY OF BREAST, INCISIONAL      Description: Incisional Breast Biopsy;  Recorded: 11/13/2007;  Comments: benign     COLONOSCOPY N/A 6/14/2019    Procedure: COLONOSCOPY;  Surgeon: Eduardo Mora MD;  [place] [facility] [place]  Service: Gastroenterology     ESOPHAGOSCOPY, GASTROSCOPY, DUODENOSCOPY (EGD), COMBINED N/A 11/6/2018    Procedure: ESOPHAGOGASTRODUODENOSCOPY;  Surgeon: Lit Fernando MD;  Location: " [facility] Main OR;  Service:      HYSTERECTOMY       JOINT REPLACEMENT Left     TKA     RI ABLATE HEART DYSRHYTHM FOCUS      Description: Catheter Ablation Atrial Fibrillation;  Recorded: 07/31/2012;  Comments: 7/24/12 PVI with Dr. Gardiner and nilay to all 5 pulm veins and CTI fl ablation line as well.     ZZC SUPRACERV ABD HYSTERECTOMY      Description: Supracervical Hysterectomy;  Proc Date: 01/01/1985;  Comments: some cervix left!; ovaries intact; done for bleeding        Social History     Socioeconomic History     Marital status:      Spouse name: Not on file     Number of children: Not on file     Years of education: Not on file     Highest education level: Not on file   Occupational History     Not on file   Tobacco Use     Smoking status: Current Every Day Smoker     Packs/day: 0.50     Types: Cigarettes     Smokeless tobacco: Never Used   Substance and Sexual Activity     Alcohol use: Yes     Comment: Alcoholic Drinks/day: very little     Drug use: No     Sexual activity: Not on file   Other Topics Concern     Not on file   Social History Narrative     Not on file     Social Determinants of Health     Financial Resource Strain: Not on file   Food Insecurity: Not on file   Transportation Needs: Not on file   Physical Activity: Not on file   Stress: Not on file   Social Connections: Not on file   Intimate Partner Violence: Not on file   Housing Stability: Not on file        Family History   Problem Relation Age of Onset     Heart Failure Mother      Cancer Other         paternal HX-laryngeal      Alcoholism Sister      No Known Problems Daughter      No Known Problems Maternal Grandmother      No Known Problems Maternal Grandfather      No Known Problems Paternal Grandmother      No Known Problems Paternal Grandfather      No Known Problems Maternal Aunt      No Known Problems Paternal Aunt      Alcoholism Sister      Alcoholism Brother      Alcoholism Father      Cancer Paternal Uncle          Gastric-Alcohol     Cancer Paternal Uncle         gastric-Alcohol     Hereditary Breast and Ovarian Cancer Syndrome No family hx of      Breast Cancer No family hx of      Colon Cancer No family hx of      Endometrial Cancer No family hx of      Ovarian Cancer No family hx of         Review of Systems   Constitutional: Negative for unexpected weight change.   Cardiovascular: Positive for palpitations. Negative for leg swelling.   Gastrointestinal: Positive for constipation. Negative for abdominal pain and blood in stool.   Musculoskeletal: Positive for arthralgias and neck stiffness.   All other systems reviewed and are negative.       /73   Pulse 69   Temp 98.1  F (36.7  C)   SpO2 95%      Physical Exam  Constitutional:       General: She is not in acute distress.     Appearance: She is well-developed. She is ill-appearing (looks chronically ill).   HENT:      Right Ear: Tympanic membrane and external ear normal.      Left Ear: Tympanic membrane and external ear normal.      Nose: Nose normal.      Mouth/Throat:      Pharynx: No oropharyngeal exudate.   Eyes:      General:         Right eye: No discharge.         Left eye: No discharge.      Conjunctiva/sclera: Conjunctivae normal.      Pupils: Pupils are equal, round, and reactive to light.   Neck:      Thyroid: No thyromegaly.      Trachea: No tracheal deviation.   Cardiovascular:      Rate and Rhythm: Normal rate and regular rhythm.      Pulses: Normal pulses.      Heart sounds: Normal heart sounds, S1 normal and S2 normal. No murmur heard.  No friction rub. No S3 or S4 sounds.    Pulmonary:      Effort: Pulmonary effort is normal. No respiratory distress.      Breath sounds: Normal breath sounds. No wheezing or rales.   Abdominal:      General: Bowel sounds are normal.      Palpations: Abdomen is soft. There is no mass.      Tenderness: There is no abdominal tenderness.   Musculoskeletal:         General: Normal range of motion.      Cervical back:  Neck supple.      Comments: DJD changes - knees, hands,   Tender to palpation at posterior lower cervical muscles   Lymphadenopathy:      Cervical: No cervical adenopathy.   Skin:     General: Skin is warm and dry.      Findings: No rash.   Neurological:      Mental Status: She is alert and oriented to person, place, and time.      Motor: No abnormal muscle tone.      Deep Tendon Reflexes: Reflexes are normal and symmetric.   Psychiatric:         Thought Content: Thought content normal.         Judgment: Judgment normal.          Results:  Results for orders placed or performed in visit on 12/24/21   Hemoglobin A1c     Status: Abnormal   Result Value Ref Range    Hemoglobin A1C 6.1 (H) 0.0 - 5.6 %   Lipid Profile (Chol, Trig, HDL, LDL calc)     Status: None   Result Value Ref Range    Cholesterol 171 <=199 mg/dL    Triglycerides 65 <=149 mg/dL    Direct Measure HDL 81 >=50 mg/dL    LDL Cholesterol Calculated 77 <=129 mg/dL    Patient Fasting > 8hrs? Unknown    Comprehensive metabolic panel (BMP + Alb, Alk Phos, ALT, AST, Total. Bili, TP)     Status: Normal   Result Value Ref Range    Sodium 144 136 - 145 mmol/L    Potassium 3.9 3.5 - 5.0 mmol/L    Chloride 102 98 - 107 mmol/L    Carbon Dioxide (CO2) 28 22 - 31 mmol/L    Anion Gap 14 5 - 18 mmol/L    Urea Nitrogen 13 8 - 28 mg/dL    Creatinine 0.64 0.60 - 1.10 mg/dL    Calcium 9.6 8.5 - 10.5 mg/dL    Glucose 70 70 - 125 mg/dL    Alkaline Phosphatase 120 45 - 120 U/L    AST 15 0 - 40 U/L    ALT 11 0 - 45 U/L    Protein Total 7.0 6.0 - 8.0 g/dL    Albumin 3.6 3.5 - 5.0 g/dL    Bilirubin Total 0.3 0.0 - 1.0 mg/dL    GFR Estimate >90 >60 mL/min/1.73m2   TSH     Status: Normal   Result Value Ref Range    TSH 1.40 0.30 - 5.00 uIU/mL   CBC with platelets     Status: Abnormal   Result Value Ref Range    WBC Count 7.2 4.0 - 11.0 10e3/uL    RBC Count 4.47 3.80 - 5.20 10e6/uL    Hemoglobin 11.0 (L) 11.7 - 15.7 g/dL    Hematocrit 37.0 35.0 - 47.0 %    MCV 83 78 - 100 fL    MCH  "24.6 (L) 26.5 - 33.0 pg    MCHC 29.7 (L) 31.5 - 36.5 g/dL    RDW 18.3 (H) 10.0 - 15.0 %    Platelet Count 254 150 - 450 10e3/uL   INR point of care     Status: Abnormal   Result Value Ref Range    INR 3.1 (H) 0.9 - 1.1    Narrative    This test is intended for monitoring Coumadin therapy. Results are not accurate in patients with prolonged INR due to factor deficiency.       Medications at Conclusion of Visit:  Current Outpatient Medications   Medication Sig Dispense Refill     ACCU-CHEK SOFTCLIX LANCETS lancets [ACCU-CHEK SOFTCLIX LANCETS LANCETS] TEST THREE TIMES DAILY 300 each 3     albuterol (PROAIR HFA;PROVENTIL HFA;VENTOLIN HFA) 90 mcg/actuation inhaler [ALBUTEROL (PROAIR HFA;PROVENTIL HFA;VENTOLIN HFA) 90 MCG/ACTUATION INHALER] INHALE 2 PUFFS EVERY 4 HOURS AS NEEDED WHEEZING 90 g 11     atorvastatin (LIPITOR) 20 MG tablet [ATORVASTATIN (LIPITOR) 20 MG TABLET] TAKE 1 TABLET(20 MG) BY MOUTH AT BEDTIME 90 tablet 3     BD ULTRA-FINE SHORT PEN NEEDLE 31 gauge x 5/16\" Ndle [BD ULTRA-FINE SHORT PEN NEEDLE 31 GAUGE X 5/16\" NDLE] TEST FOUR TIMES DAILY WITH MEALS AND AT BEDTIME 400 each 3     blood-glucose meter (ONETOUCH VERIO IQ METER) Misc [BLOOD-GLUCOSE METER (ONETOUCH VERIO IQ METER) MISC] Check blood sugar three times a day. 1 each 0     budesonide-formoteroL (SYMBICORT) 160-4.5 mcg/actuation inhaler [BUDESONIDE-FORMOTEROL (SYMBICORT) 160-4.5 MCG/ACTUATION INHALER] Inhale 2 puffs 2 (two) times a day. Inhale 2 puff by mouth twice daily 1 Inhaler 5     cyclobenzaprine (FLEXERIL) 10 MG tablet Take 1 tablet (10 mg) by mouth nightly as needed for muscle spasms 30 tablet 0     diaper,brief,adult,disposable (ADULT BRIEFS - LARGE) Misc [DIAPER,BRIEF,ADULT,DISPOSABLE (ADULT BRIEFS - LARGE) MISC] Use 3-4 daily as needed for incontinence 120 each 6     diltiazem ER COATED BEADS (CARDIZEM CD/CARTIA XT) 120 MG 24 hr capsule Take 1 capsule (120 mg) by mouth daily 90 capsule 1     furosemide (LASIX) 20 MG tablet Take 1 tablet " (20 mg) by mouth daily as needed 90 tablet 3     gabapentin (NEURONTIN) 600 MG tablet Take 1 tablet (600 mg) by mouth 3 times daily 90 tablet 4     generic lancets (FINGERSTIX LANCETS) [GENERIC LANCETS (FINGERSTIX LANCETS)] Dispense brand per patient's insurance at pharmacy discretion. 300 each 0     meclizine (ANTIVERT) 25 MG tablet Take 1 tablet (25 mg) by mouth 3 times daily as needed for dizziness or nausea 45 tablet 5     metFORMIN (GLUCOPHAGE) 500 MG tablet TAKE 1 TABLET(500 MG) BY MOUTH TWICE DAILY WITH MEALS 180 tablet 1     mometasone-formoterol (DULERA) 200-5 mcg/actuation HFAA inhaler [MOMETASONE-FORMOTEROL (DULERA) 200-5 MCG/ACTUATION HFAA INHALER] Inhale 2 puffs 2 (two) times a day. 1 Inhaler 5     nicotine (NICODERM CQ) 21 MG/24HR 24 hr patch APPLY 1 PATCH EXTERNALLY TO THE SKIN DAILY 28 patch 0     nystatin (NYSTOP) powder [NYSTATIN (NYSTOP) POWDER] Apply 1 application topically 2 (two) times a day as needed. 2-3 times to affected area(s). 60 g 3     NYSTOP [id number] UNIT/GM powder APPLY TOPICALLY TO THE AFFECTED AREA TWICE DAILY- THREE TIMES DAILY AS NEEDED 60 g 3     omeprazole (PRILOSEC) 20 MG DR capsule TAKE 1 CAPSULE(20 MG) BY MOUTH DAILY BEFORE BREAKFAST 30 capsule 3     ONETOUCH VERIO IQ test strip USE 1 EACH AS DIRECTED THREE TIMES DAILY AS NEEDED 300 strip 3     oxyCODONE IR (ROXICODONE) 10 MG tablet Take 1 tablet (10 mg) by mouth every 6 hours as needed for moderate to severe pain 120 tablet 0     sucralfate (CARAFATE) 1 GM tablet TAKE 1 TABLET BY MOUTH FOUR TIMES DAILY(CRUSH TABLET AND MIX IT WITH A LITTLE WATER, THEN SWALLOW) 360 tablet 3     warfarin ANTICOAGULANT (COUMADIN) 5 MG tablet Take 1/2-1 tablet (2.5-5 mg) daily as directed. Adjust dose per INR results. 90 tablet 1         SAVANA SILVER MD

## 2021-12-24 NOTE — PROGRESS NOTES
ANTICOAGULATION MANAGEMENT     Mary Kay Tejada 71 year old female is on warfarin with supratherapeutic INR result. (Goal INR 2.0-3.0)    Recent labs: (last 7 days)     12/24/21  0756   INR 3.1*       ASSESSMENT     Source(s): Chart Review, Patient/Caregiver Call and Template       Warfarin doses taken: Warfarin taken as instructed    Diet: No new diet changes identified    New illness, injury, or hospitalization: No    Medication/supplement changes: None noted    Signs or symptoms of bleeding or clotting: No    Previous INR: Therapeutic last visit; previously outside of goal range    Additional findings: None     PLAN     Recommended plan for no diet, medication or health factor changes affecting INR     Dosing Instructions: Continue your current warfarin dose with next INR in 2 weeks       Summary  As of 12/24/2021    Full warfarin instructions:  2.5 mg every Sat; 5 mg all other days   Next INR check:  1/7/2022             Telephone call with Mary Kay who verbalizes understanding and agrees to plan    Patient offered & declined to schedule next visit    Education provided: Goal range and significance of current result and Importance of therapeutic range    Plan made per [facility] anticoagulation protocol    Mena Dumont RN  Anticoagulation Clinic  12/24/2021    _______________________________________________________________________     Anticoagulation Episode Summary     Current INR goal:  2.0-3.0   TTR:  66.6 % (1 y)   Target end date:  Indefinite   Send INR reminders to:  [facility]    Indications    Paroxysmal Atrial Fibrillation [I48.91]  Atrial fibrillation  unspecified type (H) [I48.91]           Comments:           Anticoagulation Care Providers     Provider Role Specialty Phone number    Cammy Bui MD Referring Family Medicine [phone]

## 2021-12-26 ASSESSMENT — ENCOUNTER SYMPTOMS
ARTHRALGIAS: 1
BLOOD IN STOOL: 0
UNEXPECTED WEIGHT CHANGE: 0
PALPITATIONS: 1
ABDOMINAL PAIN: 0
NECK STIFFNESS: 1
CONSTIPATION: 1

## 2021-12-29 DIAGNOSIS — D50.0 IRON DEFICIENCY ANEMIA DUE TO CHRONIC BLOOD LOSS: ICD-10-CM

## 2021-12-29 DIAGNOSIS — K27.9 PEPTIC ULCER: ICD-10-CM

## 2021-12-31 RX ORDER — SUCRALFATE 1 G/1
TABLET ORAL
Qty: 120 TABLET | OUTPATIENT
Start: 2021-12-31

## 2021-12-31 NOTE — TELEPHONE ENCOUNTER
"Refusing refill on sucralfate as refills available.     Last Written Prescription Date:  12/9/2021  Last Fill Quantity: 360,  # refills: 3   Last office visit provider:  12/24/2021     Requested Prescriptions   Pending Prescriptions Disp Refills     sucralfate (CARAFATE) 1 GM tablet [Pharmacy Med Name: SUCRALFATE 1GM TABLETS] 120 tablet      Sig: TAKE 1 TABLET BY MOUTH FOUR TIMES DAILY, CRUSH TABLET AND MIX WITH A LITTLE WATER THEN SWALLOW       Miscellaneous Gastrointestinal Agents Passed - 12/29/2021  7:50 AM        Passed - Recent (12 mo) or future (30 days) visit within the authorizing provider's specialty     Patient has had an office visit with the authorizing provider or a provider within the authorizing providers department within the previous 12 mos or has a future within next 30 days. See \"Patient Info\" tab in inbasket, or \"Choose Columns\" in Meds & Orders section of the refill encounter.              Passed - Medication is active on med list        Passed - Patient is 18 years of age or older      Last Written Prescription Date:  9/7/2021  Last Fill Quantity: 30,  # refills: 3   Last office visit provider:  12/24/2021        omeprazole (PRILOSEC) 20 MG DR capsule [Pharmacy Med Name: OMEPRAZOLE 20MG CAPSULES] 30 capsule 3     Sig: TAKE 1 CAPSULE(20 MG) BY MOUTH DAILY BEFORE BREAKFAST       PPI Protocol Passed - 12/29/2021  7:50 AM        Passed - Not on Clopidogrel (unless Pantoprazole ordered)        Passed - No diagnosis of osteoporosis on record        Passed - Recent (12 mo) or future (30 days) visit within the authorizing provider's specialty     Patient has had an office visit with the authorizing provider or a provider within the authorizing providers department within the previous 12 mos or has a future within next 30 days. See \"Patient Info\" tab in inbasket, or \"Choose Columns\" in Meds & Orders section of the refill encounter.              Passed - Medication is active on med list        Passed - " Patient is age 18 or older        Passed - No active pregnacy on record        Passed - No positive pregnancy test in past 12 months             Tami Turner RN 12/31/21 7:31 AM

## 2022-01-21 ENCOUNTER — OFFICE VISIT (OUTPATIENT)
Dept: FAMILY MEDICINE | Facility: CLINIC | Age: 72
End: 2022-01-21
Payer: COMMERCIAL

## 2022-01-21 ENCOUNTER — ANTICOAGULATION THERAPY VISIT (OUTPATIENT)
Dept: ANTICOAGULATION | Facility: CLINIC | Age: 72
End: 2022-01-21

## 2022-01-21 VITALS — DIASTOLIC BLOOD PRESSURE: 70 MMHG | RESPIRATION RATE: 16 BRPM | SYSTOLIC BLOOD PRESSURE: 129 MMHG | HEART RATE: 68 BPM

## 2022-01-21 DIAGNOSIS — I48.91 ATRIAL FIBRILLATION (H): Primary | ICD-10-CM

## 2022-01-21 DIAGNOSIS — G89.4 CHRONIC PAIN SYNDROME: ICD-10-CM

## 2022-01-21 DIAGNOSIS — M79.7 FIBROMYALGIA: Primary | ICD-10-CM

## 2022-01-21 DIAGNOSIS — I48.91 ATRIAL FIBRILLATION, UNSPECIFIED TYPE (H): ICD-10-CM

## 2022-01-21 LAB — INR BLD: 2.1 (ref 0.9–1.1)

## 2022-01-21 PROCEDURE — 20553 NJX 1/MLT TRIGGER POINTS 3/>: CPT | Performed by: FAMILY MEDICINE

## 2022-01-21 PROCEDURE — 36416 COLLJ CAPILLARY BLOOD SPEC: CPT | Performed by: FAMILY MEDICINE

## 2022-01-21 PROCEDURE — 85610 PROTHROMBIN TIME: CPT | Performed by: FAMILY MEDICINE

## 2022-01-21 RX ORDER — OXYCODONE HYDROCHLORIDE 10 MG/1
10 TABLET ORAL EVERY 6 HOURS PRN
Qty: 120 TABLET | Refills: 0 | Status: SHIPPED | OUTPATIENT
Start: 2022-01-21 | End: 2022-02-21

## 2022-01-21 RX ORDER — LIDOCAINE HYDROCHLORIDE 10 MG/ML
10 INJECTION, SOLUTION INFILTRATION; PERINEURAL ONCE
Status: COMPLETED | OUTPATIENT
Start: 2022-01-21 | End: 2022-01-21

## 2022-01-21 RX ADMIN — LIDOCAINE HYDROCHLORIDE 10 ML: 10 INJECTION, SOLUTION INFILTRATION; PERINEURAL at 10:08

## 2022-01-21 NOTE — PROGRESS NOTES
Assessment & Plan    1. Fibromyalgia  2. Chronic pain syndrome  Patient with fibromyalgia/chronic pain syndrome ; benefits from monthly trigger point injections to decrease need for other pain medications (opiate); tolerated well.    - lidocaine 1 % injection 10 mL  - oxyCODONE IR (ROXICODONE) 10 MG tablet; Take 1 tablet (10 mg) by mouth every 6 hours as needed for moderate to severe pain  Dispense: 120 tablet; Refill: 0  - MSK: Inject Trigger Point, 1 or 2    3. Atrial fibrillation, unspecified type (H)  Due for INR due to atrial fib.    - INR point of care    [facility]    Procedure: MSK: Inject Trigger Point, 1 or 2    Date/Time: 1/21/2022 10:00 AM  Performed by: Cammy Bui MD  Authorized by: Cammy Bui MD       UNIVERSAL PROTOCOL   Site Marked: Yes  Prior Images Obtained and Reviewed:  NA  Required items: Required blood products, implants, devices and special equipment available (na)    Patient identity confirmed:  Verbally with patient  NA - No sedation, light sedation, or local anesthesia  Confirmation Checklist:  Patient's identity using two indicators  Time out: Immediately prior to the procedure a time out was called    Universal Protocol: the Joint Commission Universal Protocol was followed    Preparation: Patient was prepped and draped in usual sterile fashion    ESBL (mL):  0     ANESTHESIA    Anesthesia: Local infiltration  Local Anesthetic:  Lidocaine 1% without epinephrine  Anesthetic Total (mL):  10      SEDATION    Patient Sedated: No      PROCEDURE  Describe Procedure: Total of 6 trigger points injected (3 on left, 3 on right - posterior lateral inferior cervical, suprascapular, rhomboid)    Patient Tolerance:  Patient tolerated the procedure well with no immediate complications  Length of time physician/provider present for 1:1 monitoring during sedation: 0        Return in about 1 month (around 2/21/2022) for trigger point  "injections.    Chief Complaint   Patient presents with     RECHECK     pt stated she supposed to be getting a shot today       HPI  Here for trigger point injections -   Pain is \"bad\" today - mostly posterior shoulders/suprascapular/paracervical     No new symptoms       Patient Active Problem List   Diagnosis     Abnormal Weight Loss     Osteoarthritis Of The Shoulder     Chronic Constipation     Onychomycosis Of The Toenails     Diarrhea     Ganglion Of The Left Wrist     Larynx Edema     Obesity     Medial Epicondylitis     Skin Lesion     Urinary Incontinence     Chronic Major Depression     Dry Eye Syndrome     Nicotine Dependence     Hypokalemia     Essential hypertension     Muscle Cramps In The Calf     Edema     Atrial flutter (H)     Lump In / On The Skin     Chronic pain syndrome     Paroxysmal Atrial Fibrillation     Fibromyalgia     Nausea     Abdominal Pain     Peptic Ulcer     Mixed hyperlipidemia     Chronic Reflux Esophagitis     Benign Adenomatous Polyp Of The Large Intestine     Vertigo     Rotator cuff tendonitis     Generalized osteoarthrosis, involving multiple sites     Tendonitis of wrist, left     Trigger point of thoracic region     Flashers or floaters of right eye     Menopause     Tendonitis of wrist, right     Skin lesion of face     Hematoma of abdominal wall     Headache     Cervical neck pain with evidence of disc disease     Controlled substance agreement signed     Aspiration pneumonia (H)     Type 2 diabetes mellitus with hypoglycemia (H)     Candidal intertrigo     Osteoarthritis, hip, bilateral     Primary osteoarthritis of left knee     Osteoarthritis of both knees     Syncope     Opacity of lung on imaging study     Acute gastritis     Gastroenteritis     Nonrheumatic aortic valve stenosis     Bunion, left     Callus of foot     Cataract     Chronic anemia     Anemia due to blood loss, acute     Diabetic polyneuropathy associated with type 2 diabetes mellitus (H)     Upper GI " "bleed     Melena     Dyslipidemia     Skin lesion     Mixed stress and urge urinary incontinence     Primary osteoarthritis of both knees     Advanced directives, counseling/discussion     Chronic gastric ulcer without hemorrhage and without perforation     Atrial fibrillation, unspecified type (H)        Past Medical History:   Diagnosis Date     Anemia      Aortic stenosis      Atrial fibrillation (H)      Atrial flutter (H)      Benign neoplasm of adenomatous polyp     large intestine      Chronic constipation      Chronic pain syndrome      COPD (chronic obstructive pulmonary disease) (H)     Oxygen at night      Dependence on supplemental oxygen     Oxygen at noc, during the day as needed     Depression      Diabetes mellitus (H)      Dry eye syndrome      Fibromyalgia      Ganglion     left wrist     GERD (gastroesophageal reflux disease)      Hyperlipidemia      Hypertension      Hypokalemia      Infective otitis externa, unspecified     Created by Conversion      Larynx edema      Lung disease      Malignant neoplasm of vulva (H)     Created by Conversion [facility] Annotation: Apr 17 2007  8:24AM - Cammy Bui:  resection per Dr. Alfonso Mane 9/06;  Replacement Utility updated for latest IMO load     Medial epicondylitis      Onychomycosis      Osteoarthritis      Peptic ulcer      Polyneuropathy      Vulvar malignant neoplasm (H)         Current Outpatient Medications   Medication     ACCU-CHEK SOFTCLIX LANCETS lancets     albuterol (PROAIR HFA;PROVENTIL HFA;VENTOLIN HFA) 90 mcg/actuation inhaler     atorvastatin (LIPITOR) 20 MG tablet     BD ULTRA-FINE SHORT PEN NEEDLE 31 gauge x 5/16\" Ndle     blood-glucose meter (ONETOUCH VERIO IQ METER) Misc     budesonide-formoteroL (SYMBICORT) 160-4.5 mcg/actuation inhaler     cyclobenzaprine (FLEXERIL) 10 MG tablet     diaper,brief,adult,disposable (ADULT BRIEFS - LARGE) Misc     diltiazem ER COATED BEADS (CARDIZEM CD/CARTIA XT) 120 MG 24 hr capsule "     furosemide (LASIX) 20 MG tablet     gabapentin (NEURONTIN) 600 MG tablet     generic lancets (FINGERSTIX LANCETS)     meclizine (ANTIVERT) 25 MG tablet     metFORMIN (GLUCOPHAGE) 500 MG tablet     mometasone-formoterol (DULERA) 200-5 mcg/actuation HFAA inhaler     nicotine (NICODERM CQ) 21 MG/24HR 24 hr patch     nystatin (NYSTOP) powder     NYSTOP [id number] UNIT/GM powder     omeprazole (PRILOSEC) 20 MG DR capsule     ONETOUCH VERIO IQ test strip     oxyCODONE IR (ROXICODONE) 10 MG tablet     sucralfate (CARAFATE) 1 GM tablet     warfarin ANTICOAGULANT (COUMADIN) 5 MG tablet     No current facility-administered medications for this visit.        Past Surgical History:   Procedure Laterality Date     BIOPSY BREAST Right      BIOPSY BREAST Right 01/28/2015     BIOPSY BREAST Right 1/28/2015    Procedure: RIGHT BREAST BIOPSY AFTER WIRE LOCALIZATION AT 0940;  Surgeon: Renée Soriano MD;  Location: [facility];  Service:      BIOPSY OF BREAST, INCISIONAL      Description: Incisional Breast Biopsy;  Recorded: 11/13/2007;  Comments: benign     COLONOSCOPY N/A 6/14/2019    Procedure: COLONOSCOPY;  Surgeon: Eduardo Mora MD;  Location: [facility];  Service: Gastroenterology     ESOPHAGOSCOPY, GASTROSCOPY, DUODENOSCOPY (EGD), COMBINED N/A 11/6/2018    Procedure: ESOPHAGOGASTRODUODENOSCOPY;  Surgeon: Lit Fernando MD;  Location: [facility];  Service:      HYSTERECTOMY       JOINT REPLACEMENT Left     TKA     NM ABLATE HEART DYSRHYTHM FOCUS      Description: Catheter Ablation Atrial Fibrillation;  Recorded: 07/31/2012;  Comments: 7/24/12 PVI with Dr. Gardiner and nilay to all 5 pulm veins and CTI fl ablation line as well.     ZZC SUPRACERV ABD HYSTERECTOMY      Description: Supracervical Hysterectomy;  Proc Date: 01/01/1985;  Comments: some cervix left!; ovaries intact; done for bleeding        Social History     Socioeconomic History     Marital status:      Spouse name: Not on file      Number of children: Not on file     Years of education: Not on file     Highest education level: Not on file   Occupational History     Not on file   Tobacco Use     Smoking status: Current Every Day Smoker     Packs/day: 0.50     Types: Cigarettes     Smokeless tobacco: Never Used   Substance and Sexual Activity     Alcohol use: Yes     Comment: Alcoholic Drinks/day: very little     Drug use: No     Sexual activity: Not on file   Other Topics Concern     Not on file   Social History Narrative     Not on file     Social Determinants of Health     Financial Resource Strain: Not on file   Food Insecurity: Not on file   Transportation Needs: Not on file   Physical Activity: Not on file   Stress: Not on file   Social Connections: Not on file   Intimate Partner Violence: Not on file   Housing Stability: Not on file        Family History   Problem Relation Age of Onset     Heart Failure Mother      Cancer Other         paternal HX-laryngeal      Alcoholism Sister      No Known Problems Daughter      No Known Problems Maternal Grandmother      No Known Problems Maternal Grandfather      No Known Problems Paternal Grandmother      No Known Problems Paternal Grandfather      No Known Problems Maternal Aunt      No Known Problems Paternal Aunt      Alcoholism Sister      Alcoholism Brother      Alcoholism Father      Cancer Paternal Uncle         Gastric-Alcohol     Cancer Paternal Uncle         gastric-Alcohol     Hereditary Breast and Ovarian Cancer Syndrome No family hx of      Breast Cancer No family hx of      Colon Cancer No family hx of      Endometrial Cancer No family hx of      Ovarian Cancer No family hx of         Review of Systems   Constitutional: Negative for activity change, chills, fever and unexpected weight change.   Respiratory: Negative for cough and shortness of breath.    Cardiovascular: Negative for chest pain.   Gastrointestinal: Negative for abdominal pain and blood in stool.   Musculoskeletal:  "Positive for back pain and neck pain.   All other systems reviewed and are negative.       /70 (BP Location: Right arm, Patient Position: Sitting, Cuff Size: Adult Regular)   Pulse 68   Resp 16      Physical Exam  Constitutional:       Appearance: Normal appearance. She is not ill-appearing.   Musculoskeletal:      Comments: Trigger points identified on upper back/post scapular     Neurological:      Mental Status: She is alert.          Results:  Results for orders placed or performed in visit on 01/21/22   INR point of care     Status: Abnormal   Result Value Ref Range    INR 2.1 (H) 0.9 - 1.1    Narrative    This test is intended for monitoring Coumadin therapy. Results are not accurate in patients with prolonged INR due to factor deficiency.       Medications at Conclusion of Visit:  Current Outpatient Medications   Medication Sig Dispense Refill     ACCU-CHEK SOFTCLIX LANCETS lancets [ACCU-CHEK SOFTCLIX LANCETS LANCETS] TEST THREE TIMES DAILY 300 each 3     albuterol (PROAIR HFA;PROVENTIL HFA;VENTOLIN HFA) 90 mcg/actuation inhaler [ALBUTEROL (PROAIR HFA;PROVENTIL HFA;VENTOLIN HFA) 90 MCG/ACTUATION INHALER] INHALE 2 PUFFS EVERY 4 HOURS AS NEEDED WHEEZING 90 g 11     atorvastatin (LIPITOR) 20 MG tablet [ATORVASTATIN (LIPITOR) 20 MG TABLET] TAKE 1 TABLET(20 MG) BY MOUTH AT BEDTIME 90 tablet 3     BD ULTRA-FINE SHORT PEN NEEDLE 31 gauge x 5/16\" Ndle [BD ULTRA-FINE SHORT PEN NEEDLE 31 GAUGE X 5/16\" NDLE] TEST FOUR TIMES DAILY WITH MEALS AND AT BEDTIME 400 each 3     blood-glucose meter (ONETOUCH VERIO IQ METER) Misc [BLOOD-GLUCOSE METER (ONETOUCH VERIO IQ METER) MISC] Check blood sugar three times a day. 1 each 0     budesonide-formoteroL (SYMBICORT) 160-4.5 mcg/actuation inhaler [BUDESONIDE-FORMOTEROL (SYMBICORT) 160-4.5 MCG/ACTUATION INHALER] Inhale 2 puffs 2 (two) times a day. Inhale 2 puff by mouth twice daily 1 Inhaler 5     cyclobenzaprine (FLEXERIL) 10 MG tablet Take 1 tablet (10 mg) by mouth nightly " as needed for muscle spasms 30 tablet 0     diaper,brief,adult,disposable (ADULT BRIEFS - LARGE) Misc [DIAPER,BRIEF,ADULT,DISPOSABLE (ADULT BRIEFS - LARGE) MISC] Use 3-4 daily as needed for incontinence 120 each 6     diltiazem ER COATED BEADS (CARDIZEM CD/CARTIA XT) 120 MG 24 hr capsule Take 1 capsule (120 mg) by mouth daily 90 capsule 1     furosemide (LASIX) 20 MG tablet Take 1 tablet (20 mg) by mouth daily as needed 90 tablet 3     gabapentin (NEURONTIN) 600 MG tablet Take 1 tablet (600 mg) by mouth 3 times daily 90 tablet 4     generic lancets (FINGERSTIX LANCETS) [GENERIC LANCETS (FINGERSTIX LANCETS)] Dispense brand per patient's insurance at pharmacy discretion. 300 each 0     meclizine (ANTIVERT) 25 MG tablet Take 1 tablet (25 mg) by mouth 3 times daily as needed for dizziness or nausea 45 tablet 5     metFORMIN (GLUCOPHAGE) 500 MG tablet TAKE 1 TABLET(500 MG) BY MOUTH TWICE DAILY WITH MEALS 180 tablet 1     mometasone-formoterol (DULERA) 200-5 mcg/actuation HFAA inhaler [MOMETASONE-FORMOTEROL (DULERA) 200-5 MCG/ACTUATION HFAA INHALER] Inhale 2 puffs 2 (two) times a day. 1 Inhaler 5     nicotine (NICODERM CQ) 21 MG/24HR 24 hr patch APPLY 1 PATCH EXTERNALLY TO THE SKIN DAILY 28 patch 0     nystatin (NYSTOP) powder [NYSTATIN (NYSTOP) POWDER] Apply 1 application topically 2 (two) times a day as needed. 2-3 times to affected area(s). 60 g 3     NYSTOP [id number] UNIT/GM powder APPLY TOPICALLY TO THE AFFECTED AREA TWICE DAILY- THREE TIMES DAILY AS NEEDED 60 g 3     omeprazole (PRILOSEC) 20 MG DR capsule TAKE 1 CAPSULE(20 MG) BY MOUTH DAILY BEFORE BREAKFAST 30 capsule 3     ONETOUCH VERIO IQ test strip USE 1 EACH AS DIRECTED THREE TIMES DAILY AS NEEDED 300 strip 3     oxyCODONE IR (ROXICODONE) 10 MG tablet Take 1 tablet (10 mg) by mouth every 6 hours as needed for moderate to severe pain 120 tablet 0     sucralfate (CARAFATE) 1 GM tablet TAKE 1 TABLET BY MOUTH FOUR TIMES DAILY(CRUSH TABLET AND MIX IT WITH A LITTLE  WATER, THEN SWALLOW) 360 tablet 3     warfarin ANTICOAGULANT (COUMADIN) 5 MG tablet Take 1/2-1 tablet (2.5-5 mg) daily as directed. Adjust dose per INR results. 90 tablet 1         SAVANA SILVER MD

## 2022-01-21 NOTE — PROGRESS NOTES
ANTICOAGULATION MANAGEMENT     Mary Kay Tejada 72 year old female is on warfarin with therapeutic INR result. (Goal INR 2.0-3.0)    Recent labs: (last 7 days)     01/21/22  0835   INR 2.1*       ASSESSMENT     Source(s): Chart Review and Patient/Caregiver Call     Warfarin doses taken: Warfarin taken as instructed  Diet: No new diet changes identified  New illness, injury, or hospitalization: No  Medication/supplement changes: None noted  Signs or symptoms of bleeding or clotting: No  Previous INR: Supratherapeutic  Additional findings: None     PLAN     Recommended plan for no diet, medication or health factor changes affecting INR     Dosing Instructions: Continue your current warfarin dose with next INR in 3 weeks       Summary  As of 1/21/2022    Full warfarin instructions:  2.5 mg every Sat; 5 mg all other days   Next INR check:  2/11/2022             Telephone call with Mary Kay who verbalizes understanding and agrees to plan    Patient offered & declined to schedule next visit    Education provided: None required    Plan made per [facility] anticoagulation protocol    Norberto Turk RN  Anticoagulation Clinic  1/21/2022    _______________________________________________________________________     Anticoagulation Episode Summary     Current INR goal:  2.0-3.0   TTR:  65.8 % (1 y)   Target end date:  Indefinite   Send INR reminders to:  [facility]    Indications    Paroxysmal Atrial Fibrillation [I48.91]  Atrial fibrillation  unspecified type (H) [I48.91]           Comments:           Anticoagulation Care Providers     Provider Role Specialty Phone number    Cammy Bui MD Referring Family Medicine [phone]

## 2022-01-30 ASSESSMENT — ENCOUNTER SYMPTOMS
CHILLS: 0
COUGH: 0
UNEXPECTED WEIGHT CHANGE: 0
ABDOMINAL PAIN: 0
BACK PAIN: 1
SHORTNESS OF BREATH: 0
NECK PAIN: 1
FEVER: 0
ACTIVITY CHANGE: 0
BLOOD IN STOOL: 0

## 2022-02-11 DIAGNOSIS — J44.1 COPD EXACERBATION (H): ICD-10-CM

## 2022-02-14 RX ORDER — BUDESONIDE AND FORMOTEROL FUMARATE DIHYDRATE 160; 4.5 UG/1; UG/1
AEROSOL RESPIRATORY (INHALATION)
Qty: 10.2 G | Refills: 0 | Status: ON HOLD | OUTPATIENT
Start: 2022-02-14 | End: 2022-03-28

## 2022-02-21 ENCOUNTER — ANTICOAGULATION THERAPY VISIT (OUTPATIENT)
Dept: ANTICOAGULATION | Facility: CLINIC | Age: 72
End: 2022-02-21

## 2022-02-21 ENCOUNTER — OFFICE VISIT (OUTPATIENT)
Dept: FAMILY MEDICINE | Facility: CLINIC | Age: 72
End: 2022-02-21
Payer: COMMERCIAL

## 2022-02-21 VITALS
DIASTOLIC BLOOD PRESSURE: 73 MMHG | OXYGEN SATURATION: 94 % | SYSTOLIC BLOOD PRESSURE: 130 MMHG | HEART RATE: 82 BPM | TEMPERATURE: 97.1 F

## 2022-02-21 DIAGNOSIS — M79.7 FIBROMYALGIA: Primary | ICD-10-CM

## 2022-02-21 DIAGNOSIS — I48.91 ATRIAL FIBRILLATION, UNSPECIFIED TYPE (H): ICD-10-CM

## 2022-02-21 DIAGNOSIS — I48.91 ATRIAL FIBRILLATION (H): Primary | ICD-10-CM

## 2022-02-21 DIAGNOSIS — G89.4 CHRONIC PAIN SYNDROME: ICD-10-CM

## 2022-02-21 DIAGNOSIS — M54.6 TRIGGER POINT OF THORACIC REGION: ICD-10-CM

## 2022-02-21 LAB — INR BLD: 1.8 (ref 0.9–1.1)

## 2022-02-21 PROCEDURE — 99214 OFFICE O/P EST MOD 30 MIN: CPT | Mod: 25 | Performed by: FAMILY MEDICINE

## 2022-02-21 PROCEDURE — 20553 NJX 1/MLT TRIGGER POINTS 3/>: CPT | Performed by: FAMILY MEDICINE

## 2022-02-21 PROCEDURE — 36416 COLLJ CAPILLARY BLOOD SPEC: CPT | Performed by: FAMILY MEDICINE

## 2022-02-21 PROCEDURE — 85610 PROTHROMBIN TIME: CPT | Performed by: FAMILY MEDICINE

## 2022-02-21 RX ORDER — OXYCODONE HYDROCHLORIDE 10 MG/1
10 TABLET ORAL EVERY 6 HOURS PRN
Qty: 120 TABLET | Refills: 0 | Status: SHIPPED | OUTPATIENT
Start: 2022-02-21 | End: 2022-03-22

## 2022-02-21 ASSESSMENT — PATIENT HEALTH QUESTIONNAIRE - PHQ9: SUM OF ALL RESPONSES TO PHQ QUESTIONS 1-9: 6

## 2022-02-21 NOTE — PROGRESS NOTES
ANTICOAGULATION MANAGEMENT     Mary Kay Tejada 72 year old female is on warfarin with subtherapeutic INR result. (Goal INR 2.0-3.0)    Recent labs: (last 7 days)     02/21/22  1022   INR 1.8*       ASSESSMENT     Source(s): Chart Review and Patient/Caregiver Call     Warfarin doses taken: Missed dose(s) may be affecting INR  Diet: No new diet changes identified  New illness, injury, or hospitalization: No  Medication/supplement changes: None noted  Signs or symptoms of bleeding or clotting: No  Previous INR: Therapeutic last 2(+) visits  Additional findings: None     PLAN     Recommended plan for no diet, medication or health factor changes affecting INR     Dosing Instructions: Continue your current warfarin dose with next INR in 2 weeks       Summary  As of 2/21/2022    Full warfarin instructions:  2.5 mg every Sat; 5 mg all other days   Next INR check:  3/7/2022             Telephone call with Mary Kay who verbalizes understanding and agrees to plan    Contact [phone] to schedule and with any changes, questions or concerns.     Education provided: None required    Plan made per [facility] anticoagulation protocol    Norberto Turk RN  Anticoagulation Clinic  2/21/2022    _______________________________________________________________________     Anticoagulation Episode Summary     Current INR goal:  2.0-3.0   TTR:  61.8 % (1 y)   Target end date:  Indefinite   Send INR reminders to:  [facility]    Indications    Paroxysmal Atrial Fibrillation [I48.91]  Atrial fibrillation  unspecified type (H) [I48.91]           Comments:           Anticoagulation Care Providers     Provider Role Specialty Phone number    Cammy Bui MD Referring Family Medicine [phone]

## 2022-02-21 NOTE — PROGRESS NOTES
[facility]    Procedure: MSK: Inject Trigger Points, >3    Date/Time: 2/21/2022 11:30 AM  Performed by: Cammy Bui MD  Authorized by: Cammy Bui MD       UNIVERSAL PROTOCOL   Site Marked: Yes  Prior Images Obtained and Reviewed:  No  Required items: Required blood products, implants, devices and special equipment available    Patient identity confirmed:  Verbally with patient  NA - No sedation, light sedation, or local anesthesia  Confirmation Checklist:  Patient's identity using two indicators  Time out: Immediately prior to the procedure a time out was called    Universal Protocol: the Joint Commission Universal Protocol was followed    Preparation: Patient was prepped and draped in usual sterile fashion       ANESTHESIA    Anesthesia: Local infiltration  Local Anesthetic:  Lidocaine 1% without epinephrine  Anesthetic Total (mL):  10      SEDATION    Patient Sedated: No      PROCEDURE  Describe Procedure: 6 trigger points identified - 4 on left , 2 on right  Injected each with 1.6 ml of Lidocaine  No complications  Patient Tolerance:  Patient tolerated the procedure well with no immediate complications  Length of time physician/provider present for 1:1 monitoring during sedation: 0    Assessment & Plan    1. Atrial fibrillation, unspecified type (H)  Due for INR -   - INR point of care    2. Fibromyalgia  Here for trigger point injections - see procedure   - lidocaine 1 % 10 mL  - oxyCODONE IR (ROXICODONE) 10 MG tablet; Take 1 tablet (10 mg) by mouth every 6 hours as needed for moderate to severe pain  Dispense: 120 tablet; Refill: 0    3. Trigger point of thoracic region  As noted - procedure  - lidocaine 1 % 10 mL  - MSK: Inject Trigger Points, >3  - naloxone (NARCAN) 4 MG/0.1ML nasal spray; Spray 1 spray (4 mg) into one nostril alternating nostrils once as needed for opioid reversal every 2-3 minutes until assistance arrives  Dispense: 0.2 mL;  Refill: 0    4. Chronic pain syndrome  Patient with chronic pain - h/o recurrent GI bleeds - can't tolerate NSAIDs  - oxyCODONE IR (ROXICODONE) 10 MG tablet; Take 1 tablet (10 mg) by mouth every 6 hours as needed for moderate to severe pain  Dispense: 120 tablet; Refill: 0  - naloxone (NARCAN) 4 MG/0.1ML nasal spray; Spray 1 spray (4 mg) into one nostril alternating nostrils once as needed for opioid reversal every 2-3 minutes until assistance arrives  Dispense: 0.2 mL; Refill: 0       Return in about 4 weeks (around 3/21/2022) for Follow up.    Chief Complaint   Patient presents with     Trigger Point Injection      HPI  Here for trigger point injections -   No new symptoms  Pain mostly on left side today       Patient Active Problem List   Diagnosis     Abnormal Weight Loss     Osteoarthritis Of The Shoulder     Chronic Constipation     Onychomycosis Of The Toenails     Diarrhea     Ganglion Of The Left Wrist     Larynx Edema     Obesity     Medial Epicondylitis     Skin Lesion     Urinary Incontinence     Chronic Major Depression     Dry Eye Syndrome     Nicotine Dependence     Hypokalemia     Essential hypertension     Muscle Cramps In The Calf     Edema     Atrial flutter (H)     Lump In / On The Skin     Chronic pain syndrome     Paroxysmal Atrial Fibrillation     Fibromyalgia     Nausea     Abdominal Pain     Peptic Ulcer     Mixed hyperlipidemia     Chronic Reflux Esophagitis     Benign Adenomatous Polyp Of The Large Intestine     Vertigo     Rotator cuff tendonitis     Generalized osteoarthrosis, involving multiple sites     Tendonitis of wrist, left     Trigger point of thoracic region     Flashers or floaters of right eye     Menopause     Tendonitis of wrist, right     Skin lesion of face     Hematoma of abdominal wall     Headache     Cervical neck pain with evidence of disc disease     Controlled substance agreement signed     Aspiration pneumonia (H)     Type 2 diabetes mellitus with hypoglycemia (H)      Candidal intertrigo     Osteoarthritis, hip, bilateral     Primary osteoarthritis of left knee     Osteoarthritis of both knees     Syncope     Opacity of lung on imaging study     Acute gastritis     Gastroenteritis     Nonrheumatic aortic valve stenosis     Bunion, left     Callus of foot     Cataract     Chronic anemia     Anemia due to blood loss, acute     Diabetic polyneuropathy associated with type 2 diabetes mellitus (H)     Upper GI bleed     Melena     Dyslipidemia     Skin lesion     Mixed stress and urge urinary incontinence     Primary osteoarthritis of both knees     Advanced directives, counseling/discussion     Chronic gastric ulcer without hemorrhage and without perforation     Atrial fibrillation, unspecified type (H)        Past Medical History:   Diagnosis Date     Anemia      Aortic stenosis      Atrial fibrillation (H)      Atrial flutter (H)      Benign neoplasm of adenomatous polyp     large intestine      Chronic constipation      Chronic pain syndrome      COPD (chronic obstructive pulmonary disease) (H)     Oxygen at night      Dependence on supplemental oxygen     Oxygen at noc, during the day as needed     Depression      Diabetes mellitus (H)      Dry eye syndrome      Fibromyalgia      Ganglion     left wrist     GERD (gastroesophageal reflux disease)      Hyperlipidemia      Hypertension      Hypokalemia      Infective otitis externa, unspecified     Created by Conversion      Larynx edema      Lung disease      Malignant neoplasm of vulva (H)     Created by Conversion [facility] Annotation: Apr 17 2007  8:24AM - Cammy Bui:  resection per Dr. Alfonso Mane 9/06;  Replacement Utility updated for latest IMO load     Medial epicondylitis      Onychomycosis      Osteoarthritis      Peptic ulcer      Polyneuropathy      Vulvar malignant neoplasm (H)         Current Outpatient Medications   Medication     naloxone (NARCAN) 4 MG/0.1ML nasal spray     oxyCODONE IR  "(ROXICODONE) 10 MG tablet     ACCU-CHEK SOFTCLIX LANCETS lancets     albuterol (PROAIR HFA;PROVENTIL HFA;VENTOLIN HFA) 90 mcg/actuation inhaler     atorvastatin (LIPITOR) 20 MG tablet     BD ULTRA-FINE SHORT PEN NEEDLE 31 gauge x 5/16\" Ndle     blood-glucose meter (ONETOUCH VERIO IQ METER) Misc     cyclobenzaprine (FLEXERIL) 10 MG tablet     diaper,brief,adult,disposable (ADULT BRIEFS - LARGE) Misc     diltiazem ER COATED BEADS (CARDIZEM CD/CARTIA XT) 120 MG 24 hr capsule     furosemide (LASIX) 20 MG tablet     gabapentin (NEURONTIN) 600 MG tablet     generic lancets (FINGERSTIX LANCETS)     meclizine (ANTIVERT) 25 MG tablet     metFORMIN (GLUCOPHAGE) 500 MG tablet     mometasone-formoterol (DULERA) 200-5 mcg/actuation HFAA inhaler     nicotine (NICODERM CQ) 21 MG/24HR 24 hr patch     nystatin (NYSTOP) powder     NYSTOP 937841 UNIT/GM powder     omeprazole (PRILOSEC) 20 MG DR capsule     ONETOUCH VERIO IQ test strip     sucralfate (CARAFATE) 1 GM tablet     SYMBICORT 160-4.5 MCG/ACT Inhaler     warfarin ANTICOAGULANT (COUMADIN) 5 MG tablet     No current facility-administered medications for this visit.        Past Surgical History:   Procedure Laterality Date     BIOPSY BREAST Right      BIOPSY BREAST Right 01/28/2015     BIOPSY BREAST Right 1/28/2015    Procedure: RIGHT BREAST BIOPSY AFTER WIRE LOCALIZATION AT 0940;  Surgeon: Renée Soriano MD;  Location: [facility];  Service:      BIOPSY OF BREAST, INCISIONAL      Description: Incisional Breast Biopsy;  Recorded: 11/13/2007;  Comments: benign     COLONOSCOPY N/A 6/14/2019    Procedure: COLONOSCOPY;  Surgeon: Eduardo Mora MD;  Location: [facility];  Service: Gastroenterology     ESOPHAGOSCOPY, GASTROSCOPY, DUODENOSCOPY (EGD), COMBINED N/A 11/6/2018    Procedure: ESOPHAGOGASTRODUODENOSCOPY;  Surgeon: Lit Fernando MD;  Location: [facility];  Service:      HYSTERECTOMY       JOINT REPLACEMENT Left     TKA     NH ABLATE HEART " DYSRHYTHM FOCUS      Description: Catheter Ablation Atrial Fibrillation;  Recorded: 07/31/2012;  Comments: 7/24/12 PVI with Dr. Gardiner and nilay to all 5 pulm veins and CTI fl ablation line as well.     ZZC SUPRACERV ABD HYSTERECTOMY      Description: Supracervical Hysterectomy;  Proc Date: 01/01/1985;  Comments: some cervix left!; ovaries intact; done for bleeding        Social History     Socioeconomic History     Marital status:      Spouse name: Not on file     Number of children: Not on file     Years of education: Not on file     Highest education level: Not on file   Occupational History     Not on file   Tobacco Use     Smoking status: Current Every Day Smoker     Packs/day: 0.50     Types: Cigarettes     Smokeless tobacco: Never Used   Substance and Sexual Activity     Alcohol use: Yes     Comment: Alcoholic Drinks/day: very little     Drug use: No     Sexual activity: Not on file   Other Topics Concern     Not on file   Social History Narrative     Not on file     Social Determinants of Health     Financial Resource Strain: Not on file   Food Insecurity: Not on file   Transportation Needs: Not on file   Physical Activity: Not on file   Stress: Not on file   Social Connections: Not on file   Intimate Partner Violence: Not on file   Housing Stability: Not on file        Family History   Problem Relation Age of Onset     Heart Failure Mother      Cancer Other         paternal HX-laryngeal      Alcoholism Sister      No Known Problems Daughter      No Known Problems Maternal Grandmother      No Known Problems Maternal Grandfather      No Known Problems Paternal Grandmother      No Known Problems Paternal Grandfather      No Known Problems Maternal Aunt      No Known Problems Paternal Aunt      Alcoholism Sister      Alcoholism Brother      Alcoholism Father      Cancer Paternal Uncle         Gastric-Alcohol     Cancer Paternal Uncle         gastric-Alcohol     Hereditary Breast and Ovarian Cancer  Syndrome No family hx of      Breast Cancer No family hx of      Colon Cancer No family hx of      Endometrial Cancer No family hx of      Ovarian Cancer No family hx of         Review of Systems   Constitutional: Negative for activity change, chills and fever.   Cardiovascular: Negative for chest pain.   Gastrointestinal: Negative for abdominal pain.   Musculoskeletal: Positive for back pain, myalgias and neck pain.   All other systems reviewed and are negative.       /73   Pulse 82   Temp 97.1  F (36.2  C)   SpO2 94%      Physical Exam  Constitutional:       General: She is not in acute distress.     Appearance: She is well-developed. She is ill-appearing (chronically ill - looks older than stated age).   HENT:      Right Ear: Tympanic membrane and external ear normal.      Left Ear: Tympanic membrane and external ear normal.      Nose: Nose normal.      Mouth/Throat:      Pharynx: No oropharyngeal exudate.   Eyes:      General:         Right eye: No discharge.         Left eye: No discharge.      Conjunctiva/sclera: Conjunctivae normal.      Pupils: Pupils are equal, round, and reactive to light.   Neck:      Thyroid: No thyromegaly.      Trachea: No tracheal deviation.   Cardiovascular:      Rate and Rhythm: Normal rate and regular rhythm.      Pulses: Normal pulses.      Heart sounds: Normal heart sounds, S1 normal and S2 normal. No murmur heard.    No friction rub. No S3 or S4 sounds.   Pulmonary:      Effort: Pulmonary effort is normal. No respiratory distress.      Breath sounds: Normal breath sounds. No wheezing or rales.   Abdominal:      General: Bowel sounds are normal.      Palpations: Abdomen is soft. There is no mass.      Tenderness: There is no abdominal tenderness.   Musculoskeletal:         General: Normal range of motion.      Cervical back: Neck supple.      Comments: DJD knees, hips, fingers   Lymphadenopathy:      Cervical: No cervical adenopathy.   Skin:     General: Skin is warm and  "dry.      Findings: No rash.   Neurological:      Mental Status: She is alert and oriented to person, place, and time.      Motor: No abnormal muscle tone.      Deep Tendon Reflexes: Reflexes are normal and symmetric.   Psychiatric:         Thought Content: Thought content normal.         Judgment: Judgment normal.          Results:  Results for orders placed or performed in visit on 02/21/22   INR point of care     Status: Abnormal   Result Value Ref Range    INR 1.8 (H) 0.9 - 1.1    Narrative    This test is intended for monitoring Coumadin therapy. Results are not accurate in patients with prolonged INR due to factor deficiency.       Medications at Conclusion of Visit:  Current Outpatient Medications   Medication Sig Dispense Refill     naloxone (NARCAN) 4 MG/0.1ML nasal spray Spray 1 spray (4 mg) into one nostril alternating nostrils once as needed for opioid reversal every 2-3 minutes until assistance arrives 0.2 mL 0     oxyCODONE IR (ROXICODONE) 10 MG tablet Take 1 tablet (10 mg) by mouth every 6 hours as needed for moderate to severe pain 120 tablet 0     ACCU-CHEK SOFTCLIX LANCETS lancets [ACCU-CHEK SOFTCLIX LANCETS LANCETS] TEST THREE TIMES DAILY 300 each 3     albuterol (PROAIR HFA;PROVENTIL HFA;VENTOLIN HFA) 90 mcg/actuation inhaler [ALBUTEROL (PROAIR HFA;PROVENTIL HFA;VENTOLIN HFA) 90 MCG/ACTUATION INHALER] INHALE 2 PUFFS EVERY 4 HOURS AS NEEDED WHEEZING 90 g 11     atorvastatin (LIPITOR) 20 MG tablet [ATORVASTATIN (LIPITOR) 20 MG TABLET] TAKE 1 TABLET(20 MG) BY MOUTH AT BEDTIME 90 tablet 3     BD ULTRA-FINE SHORT PEN NEEDLE 31 gauge x 5/16\" Ndle [BD ULTRA-FINE SHORT PEN NEEDLE 31 GAUGE X 5/16\" NDLE] TEST FOUR TIMES DAILY WITH MEALS AND AT BEDTIME 400 each 3     blood-glucose meter (ONETOUCH VERIO IQ METER) Misc [BLOOD-GLUCOSE METER (ONETOUCH VERIO IQ METER) MISC] Check blood sugar three times a day. 1 each 0     cyclobenzaprine (FLEXERIL) 10 MG tablet Take 1 tablet (10 mg) by mouth nightly as needed " for muscle spasms 30 tablet 0     diaper,brief,adult,disposable (ADULT BRIEFS - LARGE) Misc [DIAPER,BRIEF,ADULT,DISPOSABLE (ADULT BRIEFS - LARGE) MISC] Use 3-4 daily as needed for incontinence 120 each 6     diltiazem ER COATED BEADS (CARDIZEM CD/CARTIA XT) 120 MG 24 hr capsule Take 1 capsule (120 mg) by mouth daily 90 capsule 1     furosemide (LASIX) 20 MG tablet Take 1 tablet (20 mg) by mouth daily as needed 90 tablet 3     gabapentin (NEURONTIN) 600 MG tablet Take 1 tablet (600 mg) by mouth 3 times daily 90 tablet 4     generic lancets (FINGERSTIX LANCETS) [GENERIC LANCETS (FINGERSTIX LANCETS)] Dispense brand per patient's insurance at pharmacy discretion. 300 each 0     meclizine (ANTIVERT) 25 MG tablet Take 1 tablet (25 mg) by mouth 3 times daily as needed for dizziness or nausea 45 tablet 5     metFORMIN (GLUCOPHAGE) 500 MG tablet TAKE 1 TABLET(500 MG) BY MOUTH TWICE DAILY WITH MEALS 180 tablet 1     mometasone-formoterol (DULERA) 200-5 mcg/actuation HFAA inhaler [MOMETASONE-FORMOTEROL (DULERA) 200-5 MCG/ACTUATION HFAA INHALER] Inhale 2 puffs 2 (two) times a day. 1 Inhaler 5     nicotine (NICODERM CQ) 21 MG/24HR 24 hr patch APPLY 1 PATCH EXTERNALLY TO THE SKIN DAILY 28 patch 0     nystatin (NYSTOP) powder [NYSTATIN (NYSTOP) POWDER] Apply 1 application topically 2 (two) times a day as needed. 2-3 times to affected area(s). 60 g 3     NYSTOP [id number] UNIT/GM powder APPLY TOPICALLY TO THE AFFECTED AREA TWICE DAILY- THREE TIMES DAILY AS NEEDED 60 g 3     omeprazole (PRILOSEC) 20 MG DR capsule TAKE 1 CAPSULE(20 MG) BY MOUTH DAILY BEFORE BREAKFAST 30 capsule 3     ONETOUCH VERIO IQ test strip USE 1 EACH AS DIRECTED THREE TIMES DAILY AS NEEDED 300 strip 3     sucralfate (CARAFATE) 1 GM tablet TAKE 1 TABLET BY MOUTH FOUR TIMES DAILY(CRUSH TABLET AND MIX IT WITH A LITTLE WATER, THEN SWALLOW) 360 tablet 3     SYMBICORT 160-4.5 MCG/ACT Inhaler INHALE 2 PUFFS BY MOUTH TWICE DAILY 10.2 g 0     warfarin ANTICOAGULANT  (COUMADIN) 5 MG tablet Take 1/2-1 tablet (2.5-5 mg) daily as directed. Adjust dose per INR results. 90 tablet 1         SAVANA SILVER MD

## 2022-02-27 DIAGNOSIS — I48.92 ATRIAL FLUTTER, UNSPECIFIED TYPE (H): ICD-10-CM

## 2022-02-27 ASSESSMENT — ENCOUNTER SYMPTOMS
FEVER: 0
NECK PAIN: 1
ACTIVITY CHANGE: 0
CHILLS: 0
ABDOMINAL PAIN: 0
BACK PAIN: 1
MYALGIAS: 1

## 2022-02-28 DIAGNOSIS — I48.0 PAROXYSMAL ATRIAL FIBRILLATION (H): ICD-10-CM

## 2022-02-28 RX ORDER — DILTIAZEM HYDROCHLORIDE 120 MG/1
CAPSULE, COATED, EXTENDED RELEASE ORAL
Qty: 90 CAPSULE | Refills: 0 | Status: SHIPPED | OUTPATIENT
Start: 2022-02-28 | End: 2022-05-24

## 2022-03-02 RX ORDER — WARFARIN SODIUM 5 MG/1
TABLET ORAL
Qty: 90 TABLET | Refills: 1 | Status: ON HOLD | OUTPATIENT
Start: 2022-03-02 | End: 2023-01-19

## 2022-03-02 NOTE — TELEPHONE ENCOUNTER
"Routing refill request to provider for review/approval because:  INR level requires provider review.    Last Written Prescription Date:  11/1/2021  Last Fill Quantity: 90,  # refills: 1   Last office visit provider:  2/21/2022     Requested Prescriptions   Pending Prescriptions Disp Refills     warfarin ANTICOAGULANT (COUMADIN) 5 MG tablet [Pharmacy Med Name: WARFARIN SOD 5MG TABLETS (PEACH)] 90 tablet 1     Sig: TAKE 1/2 TO 1 TABLET BY MOUTH DAILY AS DIRECTED. ADJUST DOSE PER INR RESULTS       Vitamin K Antagonists Failed - 2/28/2022  8:57 AM        Failed - INR is within goal in the past 6 weeks     Confirm INR is within goal in the past 6 weeks.     Recent Labs   Lab Test 02/21/22  1022   INR 1.8*                       Passed - Recent (12 mo) or future (30 days) visit within the authorizing provider's specialty     Patient has had an office visit with the authorizing provider or a provider within the authorizing providers department within the previous 12 mos or has a future within next 30 days. See \"Patient Info\" tab in inbasket, or \"Choose Columns\" in Meds & Orders section of the refill encounter.              Passed - Medication is active on med list        Passed - Patient is 18 years of age or older        Passed - Patient is not pregnant        Passed - No positive pregnancy on file in past 12 months             Niecy Pearson RN 03/02/22 9:39 AM  "

## 2022-03-14 ENCOUNTER — TELEPHONE (OUTPATIENT)
Dept: ANTICOAGULATION | Facility: CLINIC | Age: 72
End: 2022-03-14
Payer: COMMERCIAL

## 2022-03-14 NOTE — TELEPHONE ENCOUNTER
ANTICOAGULATION     Mary Kay S Terrell is overdue for INR check.      spoke with Cecilia who declined to schedule a lab appointment at this time. If calling back please schedule as soon as possible.    Norberto Turk RN

## 2022-03-22 ENCOUNTER — ANTICOAGULATION THERAPY VISIT (OUTPATIENT)
Dept: ANTICOAGULATION | Facility: CLINIC | Age: 72
End: 2022-03-22

## 2022-03-22 ENCOUNTER — OFFICE VISIT (OUTPATIENT)
Dept: FAMILY MEDICINE | Facility: CLINIC | Age: 72
End: 2022-03-22
Payer: COMMERCIAL

## 2022-03-22 ENCOUNTER — LAB (OUTPATIENT)
Dept: LAB | Facility: CLINIC | Age: 72
End: 2022-03-22

## 2022-03-22 VITALS
DIASTOLIC BLOOD PRESSURE: 64 MMHG | HEART RATE: 81 BPM | OXYGEN SATURATION: 86 % | SYSTOLIC BLOOD PRESSURE: 117 MMHG | TEMPERATURE: 97.8 F

## 2022-03-22 DIAGNOSIS — E11.42 DIABETIC POLYNEUROPATHY ASSOCIATED WITH TYPE 2 DIABETES MELLITUS (H): ICD-10-CM

## 2022-03-22 DIAGNOSIS — J44.1 COPD EXACERBATION (H): ICD-10-CM

## 2022-03-22 DIAGNOSIS — I48.91 ATRIAL FIBRILLATION, UNSPECIFIED TYPE (H): ICD-10-CM

## 2022-03-22 DIAGNOSIS — M54.6 TRIGGER POINT OF THORACIC REGION: ICD-10-CM

## 2022-03-22 DIAGNOSIS — Z12.11 SCREEN FOR COLON CANCER: ICD-10-CM

## 2022-03-22 DIAGNOSIS — I48.91 ATRIAL FIBRILLATION (H): Primary | ICD-10-CM

## 2022-03-22 DIAGNOSIS — G89.4 CHRONIC PAIN SYNDROME: ICD-10-CM

## 2022-03-22 DIAGNOSIS — M79.7 FIBROMYALGIA: Primary | ICD-10-CM

## 2022-03-22 LAB — INR BLD: 1.8 (ref 0.9–1.1)

## 2022-03-22 PROCEDURE — 20553 NJX 1/MLT TRIGGER POINTS 3/>: CPT | Performed by: FAMILY MEDICINE

## 2022-03-22 PROCEDURE — 36416 COLLJ CAPILLARY BLOOD SPEC: CPT

## 2022-03-22 PROCEDURE — 85610 PROTHROMBIN TIME: CPT

## 2022-03-22 PROCEDURE — 99214 OFFICE O/P EST MOD 30 MIN: CPT | Mod: 25 | Performed by: FAMILY MEDICINE

## 2022-03-22 RX ORDER — OXYCODONE HYDROCHLORIDE 10 MG/1
10 TABLET ORAL EVERY 6 HOURS PRN
Qty: 120 TABLET | Refills: 0 | Status: SHIPPED | OUTPATIENT
Start: 2022-03-22 | End: 2022-04-25

## 2022-03-22 NOTE — PROGRESS NOTES
[facility]    Procedure: MSK: Inject Trigger Point, 1 or 2    Date/Time: 3/22/2022 11:53 AM  Performed by: Cammy Bui MD  Authorized by: Cammy Bui MD       UNIVERSAL PROTOCOL   Site Marked: Yes  Prior Images Obtained and Reviewed:  Yes  Required items: Required blood products, implants, devices and special equipment available (na)    Patient identity confirmed:  Verbally with patient  NA - No sedation, light sedation, or local anesthesia  Confirmation Checklist:  Patient's identity using two indicators  Time out: Immediately prior to the procedure a time out was called    Universal Protocol: the Joint Commission Universal Protocol was followed    Preparation: Patient was prepped and draped in usual sterile fashion    ESBL (mL):  0     ANESTHESIA    Anesthesia: Local infiltration  Local Anesthetic:  Lidocaine 1% without epinephrine  Anesthetic Total (mL):  10      SEDATION    Patient Sedated: No      PROCEDURE  Describe Procedure: 6 trigger points identified and injected (due to fibromyalgia) in lower cervical/upper thoracic  No complications    Patient Tolerance:  Patient tolerated the procedure well with no immediate complications  Length of time physician/provider present for 1:1 monitoring during sedation: 0    Assessment & Plan    1. Fibromyalgia  2. Trigger point of thoracic region  Patient is here for trigger point injections - these allow her improvement in pain syndrome - without increasing opiate use (patient has h/o gastric ulcer - hasn't been able to find anything to improve pain syndrome without this.  See procedure note.    - oxyCODONE IR (ROXICODONE) 10 MG tablet; Take 1 tablet (10 mg) by mouth every 6 hours as needed for moderate to severe pain  Dispense: 120 tablet; Refill: 0  - lidocaine 1 % 10 mL  - MSK: Inject Trigger Point, 1 or 2    3. Chronic pain syndrome  As above - patient with chronic pain syndrome - h/o underlying gastric  "ulcer disease - occasional episodes of bleeding  - oxyCODONE IR (ROXICODONE) 10 MG tablet; Take 1 tablet (10 mg) by mouth every 6 hours as needed for moderate to severe pain  Dispense: 120 tablet; Refill: 0    4. COPD exacerbation (H)  Patient with recent URI symptoms - treated.  Patient notes that she is feeling better - \"if you didn't treat me, I would have ended up in the hospital\".  Feeling better after antibiotics and prednisone.      5. Diabetic polyneuropathy associated with type 2 diabetes mellitus (H)  Patient has h/o neuropathy - contributes to pain syndrome -     6. Screen for colon cancer  Due for colon cancer screening - discussed -        Return in about 4 weeks (around 4/19/2022) for trigger point injection.    Chief Complaint   Patient presents with     Injections      HPI  Here for trigger point injections   Lots of pain in upper back/ lower neck    Had recent respiratory infection - treated by phone -   Feeling so much better  \"thought I'd end up in the hospital if you didn't give me those medications\" - but now feeling good       Patient Active Problem List   Diagnosis     Abnormal Weight Loss     Osteoarthritis Of The Shoulder     Chronic Constipation     Onychomycosis Of The Toenails     Diarrhea     Ganglion Of The Left Wrist     Larynx Edema     Obesity     Medial Epicondylitis     Skin Lesion     Urinary Incontinence     Chronic Major Depression     Dry Eye Syndrome     Nicotine Dependence     Hypokalemia     Essential hypertension     Muscle Cramps In The Calf     Edema     Atrial flutter (H)     Lump In / On The Skin     Chronic pain syndrome     Paroxysmal Atrial Fibrillation     Fibromyalgia     Nausea     Abdominal Pain     Peptic Ulcer     Mixed hyperlipidemia     Chronic Reflux Esophagitis     Benign Adenomatous Polyp Of The Large Intestine     Vertigo     Rotator cuff tendonitis     Generalized osteoarthrosis, involving multiple sites     Tendonitis of wrist, left     Trigger point of " thoracic region     Flashers or floaters of right eye     Menopause     Tendonitis of wrist, right     Skin lesion of face     Hematoma of abdominal wall     Headache     Cervical neck pain with evidence of disc disease     Controlled substance agreement signed     Aspiration pneumonia (H)     Type 2 diabetes mellitus with hypoglycemia (H)     Candidal intertrigo     Osteoarthritis, hip, bilateral     Primary osteoarthritis of left knee     Osteoarthritis of both knees     Syncope     Opacity of lung on imaging study     Acute gastritis     Gastroenteritis     Nonrheumatic aortic valve stenosis     Bunion, left     Callus of foot     Cataract     Chronic anemia     Anemia due to blood loss, acute     Diabetic polyneuropathy associated with type 2 diabetes mellitus (H)     Upper GI bleed     Melena     Dyslipidemia     Skin lesion     Mixed stress and urge urinary incontinence     Primary osteoarthritis of both knees     Advanced directives, counseling/discussion     Chronic gastric ulcer without hemorrhage and without perforation     Atrial fibrillation, unspecified type (H)     Nicotine dependence     COPD exacerbation (H)     Acute on chronic respiratory failure with hypoxia (H)     Pneumonia of right lower lobe due to infectious organism     Dyspnea, unspecified type     Acute and chronic respiratory failure with hypercapnia (H)        Past Medical History:   Diagnosis Date     Anemia      Aortic stenosis      Atrial fibrillation (H)      Atrial flutter (H)      Benign neoplasm of adenomatous polyp     large intestine      Chronic constipation      Chronic pain syndrome      COPD (chronic obstructive pulmonary disease) (H)     Oxygen at night      Dependence on supplemental oxygen     Oxygen at noc, during the day as needed     Depression      Diabetes mellitus (H)      Dry eye syndrome      Fibromyalgia      Ganglion     left wrist     GERD (gastroesophageal reflux disease)      Hyperlipidemia      Hypertension       Hypokalemia      Infective otitis externa, unspecified     Created by Conversion      Larynx edema      Lung disease      Malignant neoplasm of vulva (H)     Created by Conversion [facility] Annotation: Apr 17 2007  8:24AM - Cammy Bui:  resection per Dr. Alfonso Mane 9/06;  Replacement Utility updated for latest IMO load     Medial epicondylitis      Onychomycosis      Osteoarthritis      Peptic ulcer      Polyneuropathy      Vulvar malignant neoplasm (H)         No current facility-administered medications for this visit.     No current outpatient medications on file.     Facility-Administered Medications Ordered in Other Visits   Medication     acetaminophen (TYLENOL) tablet 650 mg    Or     acetaminophen (TYLENOL) Suppository 650 mg     albuterol (PROVENTIL HFA/VENTOLIN HFA) inhaler     atorvastatin (LIPITOR) tablet 20 mg     azithromycin 500 mg (ZITHROMAX) in 0.9% NaCl 250 mL intermittent infusion 500 mg     benzonatate (TESSALON) capsule 100 mg     cefTRIAXone (ROCEPHIN) 1 g vial to attach to  mL bag for ADULTS or NS 50 mL bag for PEDS     cyclobenzaprine (FLEXERIL) tablet 10 mg     glucose gel 15-30 g    Or     dextrose 50 % injection 25-50 mL    Or     glucagon injection 1 mg     diltiazem ER COATED BEADS (CARDIZEM CD/CARTIA XT) 24 hr capsule 120 mg     fluticasone-vilanterol (BREO ELLIPTA) 200-25 MCG/INH inhaler 1 puff     gabapentin (NEURONTIN) tablet 600 mg     guaiFENesin-codeine (ROBITUSSIN AC) 100-10 MG/5ML solution 5-10 mL     hydrOXYzine (ATARAX) tablet 25 mg     insulin aspart (NovoLOG) injection (RAPID ACTING)     insulin aspart (NovoLOG) injection (RAPID ACTING)     ipratropium - albuterol 0.5 mg/2.5 mg/3 mL (DUONEB) neb solution 3 mL     ipratropium - albuterol 0.5 mg/2.5 mg/3 mL (DUONEB) neb solution 3 mL     lidocaine (LMX4) cream     lidocaine 1 % 0.1-1 mL     meclizine (ANTIVERT) tablet 25 mg     melatonin tablet 1 mg     methylPREDNISolone sodium succinate  (solu-MEDROL) injection 62.5 mg     morphine (PF) injection 2 mg     oxyCODONE (ROXICODONE) tablet 10 mg     pantoprazole (PROTONIX) EC tablet 40 mg     Patient is already receiving anticoagulation with heparin, enoxaparin (LOVENOX), warfarin (COUMADIN)  or other anticoagulant medication     sodium chloride (PF) 0.9% PF flush 3 mL     sodium chloride (PF) 0.9% PF flush 3 mL     sucralfate (CARAFATE) tablet 1 g     Warfarin Therapy Reminder (Check START DATE - warfarin may be starting in the FUTURE)     warfarin-No DOSE today        Past Surgical History:   Procedure Laterality Date     BIOPSY BREAST Right      BIOPSY BREAST Right 01/28/2015     BIOPSY BREAST Right 1/28/2015    Procedure: RIGHT BREAST BIOPSY AFTER WIRE LOCALIZATION AT 0940;  Surgeon: Renée Soriano MD;  Location: [facility];  Service:      BIOPSY OF BREAST, INCISIONAL      Description: Incisional Breast Biopsy;  Recorded: 11/13/2007;  Comments: benign     COLONOSCOPY N/A 6/14/2019    Procedure: COLONOSCOPY;  Surgeon: Eduardo Mora MD;  Location: [facility];  Service: Gastroenterology     ESOPHAGOSCOPY, GASTROSCOPY, DUODENOSCOPY (EGD), COMBINED N/A 11/6/2018    Procedure: ESOPHAGOGASTRODUODENOSCOPY;  Surgeon: Lit Fernando MD;  Location: [facility];  Service:      HYSTERECTOMY       JOINT REPLACEMENT Left     TKA     NJ ABLATE HEART DYSRHYTHM FOCUS      Description: Catheter Ablation Atrial Fibrillation;  Recorded: 07/31/2012;  Comments: 7/24/12 PVI with Dr. Bansal to all 5 pulm veins and CTI fl ablation line as well.     ZZC SUPRACERV ABD HYSTERECTOMY      Description: Supracervical Hysterectomy;  Proc Date: 01/01/1985;  Comments: some cervix left!; ovaries intact; done for bleeding        Social History     Socioeconomic History     Marital status:      Spouse name: Not on file     Number of children: Not on file     Years of education: Not on file     Highest education level: Not on file    Occupational History     Not on file   Tobacco Use     Smoking status: Current Every Day Smoker     Packs/day: 0.50     Types: Cigarettes     Smokeless tobacco: Never Used   Substance and Sexual Activity     Alcohol use: Yes     Comment: Alcoholic Drinks/day: very little     Drug use: No     Sexual activity: Not on file   Other Topics Concern     Not on file   Social History Narrative     Not on file     Social Determinants of Health     Financial Resource Strain: Not on file   Food Insecurity: Not on file   Transportation Needs: Not on file   Physical Activity: Not on file   Stress: Not on file   Social Connections: Not on file   Intimate Partner Violence: Not on file   Housing Stability: Not on file        Family History   Problem Relation Age of Onset     Heart Failure Mother      Cancer Other         paternal HX-laryngeal      Alcoholism Sister      No Known Problems Daughter      No Known Problems Maternal Grandmother      No Known Problems Maternal Grandfather      No Known Problems Paternal Grandmother      No Known Problems Paternal Grandfather      No Known Problems Maternal Aunt      No Known Problems Paternal Aunt      Alcoholism Sister      Alcoholism Brother      Alcoholism Father      Cancer Paternal Uncle         Gastric-Alcohol     Cancer Paternal Uncle         gastric-Alcohol     Hereditary Breast and Ovarian Cancer Syndrome No family hx of      Breast Cancer No family hx of      Colon Cancer No family hx of      Endometrial Cancer No family hx of      Ovarian Cancer No family hx of         Review of Systems   Constitutional: Positive for activity change and unexpected weight change. Negative for chills and fever.   Eyes: Negative for visual disturbance.   Respiratory: Positive for cough (improving) and shortness of breath.    Cardiovascular: Negative for chest pain and leg swelling.   Gastrointestinal: Negative for abdominal pain.   Endocrine: Negative.  Negative for polydipsia and polyuria.    Genitourinary: Negative.    Musculoskeletal: Positive for back pain (trigger point pain).   Skin: Negative.    Allergic/Immunologic: Negative.    Neurological: Positive for weakness.   Hematological: Negative.    Psychiatric/Behavioral: Negative.         /64   Pulse 81   Temp 97.8  F (36.6  C)   SpO2 (!) 86%      Physical Exam  Constitutional:       General: She is not in acute distress.     Appearance: She is well-developed. She is ill-appearing (looks older than stated age). She is not toxic-appearing.   HENT:      Right Ear: Tympanic membrane and external ear normal.      Left Ear: Tympanic membrane and external ear normal.      Nose: Nose normal.      Mouth/Throat:      Pharynx: No oropharyngeal exudate.   Eyes:      General:         Right eye: No discharge.         Left eye: No discharge.      Conjunctiva/sclera: Conjunctivae normal.      Pupils: Pupils are equal, round, and reactive to light.   Neck:      Thyroid: No thyromegaly.      Trachea: No tracheal deviation.   Cardiovascular:      Rate and Rhythm: Normal rate and regular rhythm.      Pulses: Normal pulses.      Heart sounds: Normal heart sounds, S1 normal and S2 normal. No murmur heard.    No friction rub. No S3 or S4 sounds.   Pulmonary:      Effort: Pulmonary effort is normal. No respiratory distress.      Breath sounds: Normal breath sounds. No wheezing or rales.   Abdominal:      General: Bowel sounds are normal.      Palpations: Abdomen is soft. There is no mass.      Tenderness: There is no abdominal tenderness.   Musculoskeletal:         General: Normal range of motion.      Cervical back: Neck supple.      Comments: Tender to palpation at upper thoracic, lower cervical   Lymphadenopathy:      Cervical: No cervical adenopathy.   Skin:     General: Skin is warm and dry.      Findings: No rash.   Neurological:      General: No focal deficit present.      Mental Status: She is alert and oriented to person, place, and time.      Motor: No  abnormal muscle tone.      Deep Tendon Reflexes: Reflexes are normal and symmetric.   Psychiatric:         Thought Content: Thought content normal.         Judgment: Judgment normal.          Results:  Results for orders placed or performed in visit on 03/22/22   INR point of care     Status: Abnormal   Result Value Ref Range    INR 1.8 (H) 0.9 - 1.1    Narrative    This test is intended for monitoring Coumadin therapy. Results are not accurate in patients with prolonged INR due to factor deficiency.       Medications at Conclusion of Visit:  No current outpatient medications on file.         SAVANA SILVER MD

## 2022-03-22 NOTE — PROGRESS NOTES
ANTICOAGULATION MANAGEMENT     Mary Kay Tejada 72 year old female is on warfarin with subtherapeutic INR result. (Goal INR 2.0-3.0)    Recent labs: (last 7 days)     03/22/22  1122   INR 1.8*       ASSESSMENT     Source(s): Chart Review and Patient/Caregiver Call     Warfarin doses taken: Warfarin taken as instructed  Diet: No new diet changes identified  New illness, injury, or hospitalization: No  Medication/supplement changes: None noted  Signs or symptoms of bleeding or clotting: No  Previous INR: Subtherapeutic  Additional findings: None       PLAN     Recommended plan for no diet, medication or health factor changes affecting INR     Dosing Instructions:  Increase your warfarin dose (7.7% change) with next INR in 2 weeks       Summary  As of 3/22/2022    Full warfarin instructions:  2.5 mg every Sat; 5 mg all other days   Next INR check:               Telephone call with Mary Kay who verbalizes understanding and agrees to plan    Patient offered & declined to schedule next visit, needs to check with ride    Education provided: None required    Plan made per [facility] anticoagulation protocol    Norberto Turk RN  Anticoagulation Clinic  3/22/2022    _______________________________________________________________________     Anticoagulation Episode Summary     Current INR goal:  2.0-3.0   TTR:  56.2 % (1 y)   Target end date:  Indefinite   Send INR reminders to:  [facility]    Indications    Paroxysmal Atrial Fibrillation [I48.91]  Atrial fibrillation  unspecified type (H) [I48.91]           Comments:           Anticoagulation Care Providers     Provider Role Specialty Phone number    Cammy Bui MD Referring Family Medicine [phone]

## 2022-03-24 DIAGNOSIS — E11.9 TYPE 2 DIABETES MELLITUS (H): ICD-10-CM

## 2022-03-24 DIAGNOSIS — E78.2 MIXED HYPERLIPIDEMIA: ICD-10-CM

## 2022-03-25 DIAGNOSIS — J44.1 COPD EXACERBATION (H): ICD-10-CM

## 2022-03-25 RX ORDER — ATORVASTATIN CALCIUM 20 MG/1
TABLET, FILM COATED ORAL
Qty: 90 TABLET | Refills: 2 | Status: SHIPPED | OUTPATIENT
Start: 2022-03-25 | End: 2022-10-09

## 2022-03-25 NOTE — TELEPHONE ENCOUNTER
"Last Written Prescription Date:  3/29/21  Last Fill Quantity: 90,  # refills: 3   Last office visit provider:  3/22/22     Last Written Prescription Date:  10/1/21  Last Fill Quantity: 180,  # refills: 1   Last office visit provider:  3/22/21    Requested Prescriptions   Pending Prescriptions Disp Refills     atorvastatin (LIPITOR) 20 MG tablet [Pharmacy Med Name: ATORVASTATIN 20MG TABLETS] 90 tablet 3     Sig: TAKE 1 TABLET(20 MG) BY MOUTH AT BEDTIME       Statins Protocol Passed - 3/25/2022 11:07 AM        Passed - LDL on file in past 12 months     Recent Labs   Lab Test 12/24/21  0756   LDL 77             Passed - No abnormal creatine kinase in past 12 months     No lab results found.             Passed - Recent (12 mo) or future (30 days) visit within the authorizing provider's specialty     Patient has had an office visit with the authorizing provider or a provider within the authorizing providers department within the previous 12 mos or has a future within next 30 days. See \"Patient Info\" tab in inbasket, or \"Choose Columns\" in Meds & Orders section of the refill encounter.              Passed - Medication is active on med list        Passed - Patient is age 18 or older        Passed - No active pregnancy on record        Passed - No positive pregnancy test in past 12 months           metFORMIN (GLUCOPHAGE) 500 MG tablet [Pharmacy Med Name: METFORMIN 500MG TABLETS] 180 tablet 1     Sig: TAKE 1 TABLET(500 MG) BY MOUTH TWICE DAILY WITH MEALS       Biguanide Agents Passed - 3/25/2022 11:07 AM        Passed - Patient is age 10 or older        Passed - Patient has documented A1c within the specified period of time.     If HgbA1C is 8 or greater, it needs to be on file within the past 3 months.  If less than 8, must be on file within the past 6 months.     Recent Labs   Lab Test 12/24/21  0756   A1C 6.1*             Passed - Patient's CR is NOT>1.4 OR Patient's EGFR is NOT<45 within past 12 mos.     Recent Labs " "  Lab Test 12/24/21  0756 07/21/21  0911 11/11/20  1123   GFRESTIMATED >90   < > >60   GFRESTBLACK  --   --  >60    < > = values in this interval not displayed.       Recent Labs   Lab Test 12/24/21  0756   CR 0.64             Passed - Patient does NOT have a diagnosis of CHF.        Passed - Medication is active on med list        Passed - Patient is not pregnant        Passed - Patient has not had a positive pregnancy test within the past 12 mos.         Passed - Recent (6 mo) or future (30 days) visit within the authorizing provider's specialty     Patient had office visit in the last 6 months or has a visit in the next 30 days with authorizing provider or within the authorizing provider's specialty.  See \"Patient Info\" tab in inbasket, or \"Choose Columns\" in Meds & Orders section of the refill encounter.                 Quincy Dang RN 03/25/22 11:08 AM  "

## 2022-03-26 ENCOUNTER — HOSPITAL ENCOUNTER (INPATIENT)
Facility: CLINIC | Age: 72
LOS: 5 days | Discharge: HOME | DRG: 193 | End: 2022-03-31
Attending: EMERGENCY MEDICINE | Admitting: INTERNAL MEDICINE
Payer: COMMERCIAL

## 2022-03-26 ENCOUNTER — APPOINTMENT (OUTPATIENT)
Dept: CT IMAGING | Facility: CLINIC | Age: 72
DRG: 193 | End: 2022-03-26
Attending: INTERNAL MEDICINE
Payer: COMMERCIAL

## 2022-03-26 ENCOUNTER — NURSE TRIAGE (OUTPATIENT)
Dept: NURSING | Facility: CLINIC | Age: 72
End: 2022-03-26
Payer: COMMERCIAL

## 2022-03-26 ENCOUNTER — APPOINTMENT (OUTPATIENT)
Dept: RADIOLOGY | Facility: CLINIC | Age: 72
DRG: 193 | End: 2022-03-26
Attending: EMERGENCY MEDICINE
Payer: COMMERCIAL

## 2022-03-26 DIAGNOSIS — J96.21 ACUTE ON CHRONIC RESPIRATORY FAILURE WITH HYPOXIA (H): ICD-10-CM

## 2022-03-26 DIAGNOSIS — J20.9 ACUTE BRONCHITIS, UNSPECIFIED ORGANISM: ICD-10-CM

## 2022-03-26 DIAGNOSIS — R06.00 DYSPNEA, UNSPECIFIED TYPE: ICD-10-CM

## 2022-03-26 DIAGNOSIS — I48.0 PAROXYSMAL ATRIAL FIBRILLATION (H): ICD-10-CM

## 2022-03-26 DIAGNOSIS — J44.1 COPD EXACERBATION (H): Primary | ICD-10-CM

## 2022-03-26 DIAGNOSIS — J18.9 PNEUMONIA OF RIGHT LOWER LOBE DUE TO INFECTIOUS ORGANISM: ICD-10-CM

## 2022-03-26 PROBLEM — F17.200 NICOTINE DEPENDENCE: Status: ACTIVE | Noted: 2022-03-26

## 2022-03-26 LAB
ALBUMIN SERPL-MCNC: 3.5 G/DL (ref 3.5–5)
ALP SERPL-CCNC: 93 U/L (ref 45–120)
ALT SERPL W P-5'-P-CCNC: 15 U/L (ref 0–45)
ANION GAP SERPL CALCULATED.3IONS-SCNC: 11 MMOL/L (ref 5–18)
AST SERPL W P-5'-P-CCNC: 22 U/L (ref 0–40)
BILIRUB DIRECT SERPL-MCNC: 0.2 MG/DL
BILIRUB SERPL-MCNC: 0.3 MG/DL (ref 0–1)
BNP SERPL-MCNC: 67 PG/ML (ref 0–127)
BUN SERPL-MCNC: 16 MG/DL (ref 8–28)
CALCIUM SERPL-MCNC: 9.7 MG/DL (ref 8.5–10.5)
CHLORIDE BLD-SCNC: 101 MMOL/L (ref 98–107)
CO2 SERPL-SCNC: 27 MMOL/L (ref 22–31)
CREAT SERPL-MCNC: 0.69 MG/DL (ref 0.6–1.1)
ERYTHROCYTE [DISTWIDTH] IN BLOOD BY AUTOMATED COUNT: 17.5 % (ref 10–15)
FLUAV RNA SPEC QL NAA+PROBE: NEGATIVE
FLUBV RNA RESP QL NAA+PROBE: NEGATIVE
GFR SERPL CREATININE-BSD FRML MDRD: >90 ML/MIN/1.73M2
GLUCOSE BLD-MCNC: 140 MG/DL (ref 70–125)
GLUCOSE BLDC GLUCOMTR-MCNC: 149 MG/DL (ref 70–99)
HCT VFR BLD AUTO: 33.3 % (ref 35–47)
HGB BLD-MCNC: 9.9 G/DL (ref 11.7–15.7)
HOLD SPECIMEN: NORMAL
HOLD SPECIMEN: NORMAL
INR PPP: 3 (ref 0.85–1.15)
MAGNESIUM SERPL-MCNC: 1.4 MG/DL (ref 1.8–2.6)
MCH RBC QN AUTO: 24.1 PG (ref 26.5–33)
MCHC RBC AUTO-ENTMCNC: 29.7 G/DL (ref 31.5–36.5)
MCV RBC AUTO: 81 FL (ref 78–100)
PLATELET # BLD AUTO: 199 10E3/UL (ref 150–450)
POTASSIUM BLD-SCNC: 4.5 MMOL/L (ref 3.5–5)
PROT SERPL-MCNC: 7.6 G/DL (ref 6–8)
RBC # BLD AUTO: 4.1 10E6/UL (ref 3.8–5.2)
SARS-COV-2 RNA RESP QL NAA+PROBE: NEGATIVE
SODIUM SERPL-SCNC: 139 MMOL/L (ref 136–145)
TROPONIN I SERPL-MCNC: 0.01 NG/ML (ref 0–0.29)
WBC # BLD AUTO: 8.9 10E3/UL (ref 4–11)

## 2022-03-26 PROCEDURE — 82248 BILIRUBIN DIRECT: CPT | Performed by: INTERNAL MEDICINE

## 2022-03-26 PROCEDURE — 87633 RESP VIRUS 12-25 TARGETS: CPT | Performed by: INTERNAL MEDICINE

## 2022-03-26 PROCEDURE — 85027 COMPLETE CBC AUTOMATED: CPT | Performed by: EMERGENCY MEDICINE

## 2022-03-26 PROCEDURE — 999N000157 HC STATISTIC RCP TIME EA 10 MIN

## 2022-03-26 PROCEDURE — 250N000011 HC RX IP 250 OP 636: Performed by: EMERGENCY MEDICINE

## 2022-03-26 PROCEDURE — 71045 X-RAY EXAM CHEST 1 VIEW: CPT

## 2022-03-26 PROCEDURE — 210N000002 HC R&B HEART CARE

## 2022-03-26 PROCEDURE — 96367 TX/PROPH/DG ADDL SEQ IV INF: CPT

## 2022-03-26 PROCEDURE — 250N000013 HC RX MED GY IP 250 OP 250 PS 637: Performed by: INTERNAL MEDICINE

## 2022-03-26 PROCEDURE — 93005 ELECTROCARDIOGRAM TRACING: CPT | Performed by: INTERNAL MEDICINE

## 2022-03-26 PROCEDURE — 71250 CT THORAX DX C-: CPT

## 2022-03-26 PROCEDURE — 96375 TX/PRO/DX INJ NEW DRUG ADDON: CPT

## 2022-03-26 PROCEDURE — 94640 AIRWAY INHALATION TREATMENT: CPT | Mod: 76

## 2022-03-26 PROCEDURE — 36415 COLL VENOUS BLD VENIPUNCTURE: CPT | Performed by: EMERGENCY MEDICINE

## 2022-03-26 PROCEDURE — 250N000009 HC RX 250: Performed by: EMERGENCY MEDICINE

## 2022-03-26 PROCEDURE — 258N000003 HC RX IP 258 OP 636: Performed by: EMERGENCY MEDICINE

## 2022-03-26 PROCEDURE — 5A09357 ASSISTANCE WITH RESPIRATORY VENTILATION, LESS THAN 24 CONSECUTIVE HOURS, CONTINUOUS POSITIVE AIRWAY PRESSURE: ICD-10-PCS | Performed by: EMERGENCY MEDICINE

## 2022-03-26 PROCEDURE — 83880 ASSAY OF NATRIURETIC PEPTIDE: CPT | Performed by: INTERNAL MEDICINE

## 2022-03-26 PROCEDURE — 80053 COMPREHEN METABOLIC PANEL: CPT | Performed by: EMERGENCY MEDICINE

## 2022-03-26 PROCEDURE — 85610 PROTHROMBIN TIME: CPT | Performed by: EMERGENCY MEDICINE

## 2022-03-26 PROCEDURE — 99285 EMERGENCY DEPT VISIT HI MDM: CPT | Mod: 25

## 2022-03-26 PROCEDURE — 94660 CPAP INITIATION&MGMT: CPT

## 2022-03-26 PROCEDURE — 84484 ASSAY OF TROPONIN QUANT: CPT | Performed by: EMERGENCY MEDICINE

## 2022-03-26 PROCEDURE — 99223 1ST HOSP IP/OBS HIGH 75: CPT | Mod: AI | Performed by: INTERNAL MEDICINE

## 2022-03-26 PROCEDURE — 96365 THER/PROPH/DIAG IV INF INIT: CPT

## 2022-03-26 PROCEDURE — 87486 CHLMYD PNEUM DNA AMP PROBE: CPT | Performed by: INTERNAL MEDICINE

## 2022-03-26 PROCEDURE — C9803 HOPD COVID-19 SPEC COLLECT: HCPCS

## 2022-03-26 PROCEDURE — 83735 ASSAY OF MAGNESIUM: CPT | Performed by: INTERNAL MEDICINE

## 2022-03-26 PROCEDURE — 87636 SARSCOV2 & INF A&B AMP PRB: CPT | Performed by: EMERGENCY MEDICINE

## 2022-03-26 RX ORDER — BUDESONIDE AND FORMOTEROL FUMARATE DIHYDRATE 160; 4.5 UG/1; UG/1
2 AEROSOL RESPIRATORY (INHALATION)
Status: DISCONTINUED | OUTPATIENT
Start: 2022-03-26 | End: 2022-03-26 | Stop reason: CLARIF

## 2022-03-26 RX ORDER — CYCLOBENZAPRINE HCL 10 MG
10 TABLET ORAL
Status: DISCONTINUED | OUTPATIENT
Start: 2022-03-26 | End: 2022-03-31 | Stop reason: HOSPADM

## 2022-03-26 RX ORDER — CODEINE PHOSPHATE AND GUAIFENESIN 10; 100 MG/5ML; MG/5ML
5-10 SOLUTION ORAL EVERY 4 HOURS PRN
Status: DISCONTINUED | OUTPATIENT
Start: 2022-03-26 | End: 2022-03-28

## 2022-03-26 RX ORDER — SUCRALFATE 1 G/1
1 TABLET ORAL
Status: DISCONTINUED | OUTPATIENT
Start: 2022-03-26 | End: 2022-03-31 | Stop reason: HOSPADM

## 2022-03-26 RX ORDER — CEFTRIAXONE 1 G/1
1 INJECTION, POWDER, FOR SOLUTION INTRAMUSCULAR; INTRAVENOUS ONCE
Status: COMPLETED | OUTPATIENT
Start: 2022-03-26 | End: 2022-03-26

## 2022-03-26 RX ORDER — ATORVASTATIN CALCIUM 10 MG/1
20 TABLET, FILM COATED ORAL AT BEDTIME
Status: DISCONTINUED | OUTPATIENT
Start: 2022-03-26 | End: 2022-03-31 | Stop reason: HOSPADM

## 2022-03-26 RX ORDER — ACETAMINOPHEN 650 MG/1
650 SUPPOSITORY RECTAL EVERY 6 HOURS PRN
Status: DISCONTINUED | OUTPATIENT
Start: 2022-03-26 | End: 2022-03-31 | Stop reason: HOSPADM

## 2022-03-26 RX ORDER — IPRATROPIUM BROMIDE AND ALBUTEROL SULFATE 2.5; .5 MG/3ML; MG/3ML
3 SOLUTION RESPIRATORY (INHALATION)
Status: COMPLETED | OUTPATIENT
Start: 2022-03-26 | End: 2022-03-26

## 2022-03-26 RX ORDER — METHYLPREDNISOLONE SODIUM SUCCINATE 125 MG/2ML
125 INJECTION, POWDER, LYOPHILIZED, FOR SOLUTION INTRAMUSCULAR; INTRAVENOUS ONCE
Status: COMPLETED | OUTPATIENT
Start: 2022-03-26 | End: 2022-03-26

## 2022-03-26 RX ORDER — NICOTINE POLACRILEX 4 MG
15-30 LOZENGE BUCCAL
Status: DISCONTINUED | OUTPATIENT
Start: 2022-03-26 | End: 2022-03-31 | Stop reason: HOSPADM

## 2022-03-26 RX ORDER — MORPHINE SULFATE 2 MG/ML
2 INJECTION, SOLUTION INTRAMUSCULAR; INTRAVENOUS EVERY 4 HOURS PRN
Status: DISCONTINUED | OUTPATIENT
Start: 2022-03-26 | End: 2022-03-31 | Stop reason: HOSPADM

## 2022-03-26 RX ORDER — DILTIAZEM HYDROCHLORIDE 120 MG/1
120 CAPSULE, COATED, EXTENDED RELEASE ORAL DAILY
Status: DISCONTINUED | OUTPATIENT
Start: 2022-03-27 | End: 2022-03-31 | Stop reason: HOSPADM

## 2022-03-26 RX ORDER — OXYCODONE HYDROCHLORIDE 5 MG/1
10 TABLET ORAL EVERY 6 HOURS PRN
Status: DISCONTINUED | OUTPATIENT
Start: 2022-03-26 | End: 2022-03-31 | Stop reason: HOSPADM

## 2022-03-26 RX ORDER — PANTOPRAZOLE SODIUM 20 MG/1
40 TABLET, DELAYED RELEASE ORAL
Status: DISCONTINUED | OUTPATIENT
Start: 2022-03-27 | End: 2022-03-31 | Stop reason: HOSPADM

## 2022-03-26 RX ORDER — CEFTRIAXONE 1 G/1
1 INJECTION, POWDER, FOR SOLUTION INTRAMUSCULAR; INTRAVENOUS EVERY 24 HOURS
Status: DISCONTINUED | OUTPATIENT
Start: 2022-03-27 | End: 2022-03-31 | Stop reason: HOSPADM

## 2022-03-26 RX ORDER — HYDROXYZINE HYDROCHLORIDE 25 MG/1
25 TABLET, FILM COATED ORAL 3 TIMES DAILY PRN
Status: DISCONTINUED | OUTPATIENT
Start: 2022-03-26 | End: 2022-03-31 | Stop reason: HOSPADM

## 2022-03-26 RX ORDER — AZITHROMYCIN 500 MG/5ML
500 INJECTION, POWDER, LYOPHILIZED, FOR SOLUTION INTRAVENOUS EVERY 24 HOURS
Status: DISCONTINUED | OUTPATIENT
Start: 2022-03-27 | End: 2022-03-31 | Stop reason: HOSPADM

## 2022-03-26 RX ORDER — IPRATROPIUM BROMIDE AND ALBUTEROL SULFATE 2.5; .5 MG/3ML; MG/3ML
3 SOLUTION RESPIRATORY (INHALATION) EVERY 4 HOURS PRN
Status: DISCONTINUED | OUTPATIENT
Start: 2022-03-26 | End: 2022-03-27

## 2022-03-26 RX ORDER — AZITHROMYCIN 500 MG/5ML
500 INJECTION, POWDER, LYOPHILIZED, FOR SOLUTION INTRAVENOUS ONCE
Status: COMPLETED | OUTPATIENT
Start: 2022-03-26 | End: 2022-03-26

## 2022-03-26 RX ORDER — GABAPENTIN 600 MG/1
600 TABLET ORAL 3 TIMES DAILY
Status: DISCONTINUED | OUTPATIENT
Start: 2022-03-26 | End: 2022-03-31 | Stop reason: HOSPADM

## 2022-03-26 RX ORDER — ACETAMINOPHEN 325 MG/1
650 TABLET ORAL EVERY 6 HOURS PRN
Status: DISCONTINUED | OUTPATIENT
Start: 2022-03-26 | End: 2022-03-31 | Stop reason: HOSPADM

## 2022-03-26 RX ORDER — LIDOCAINE 40 MG/G
CREAM TOPICAL
Status: DISCONTINUED | OUTPATIENT
Start: 2022-03-26 | End: 2022-03-31 | Stop reason: HOSPADM

## 2022-03-26 RX ORDER — DEXTROSE MONOHYDRATE 25 G/50ML
25-50 INJECTION, SOLUTION INTRAVENOUS
Status: DISCONTINUED | OUTPATIENT
Start: 2022-03-26 | End: 2022-03-31 | Stop reason: HOSPADM

## 2022-03-26 RX ORDER — METHYLPREDNISOLONE SODIUM SUCCINATE 125 MG/2ML
60 INJECTION, POWDER, LYOPHILIZED, FOR SOLUTION INTRAMUSCULAR; INTRAVENOUS EVERY 12 HOURS
Status: DISCONTINUED | OUTPATIENT
Start: 2022-03-27 | End: 2022-03-29

## 2022-03-26 RX ORDER — NICOTINE 21 MG/24HR
1 PATCH, TRANSDERMAL 24 HOURS TRANSDERMAL DAILY
Status: DISCONTINUED | OUTPATIENT
Start: 2022-03-27 | End: 2022-03-26

## 2022-03-26 RX ADMIN — IPRATROPIUM BROMIDE AND ALBUTEROL SULFATE 3 ML: 2.5; .5 SOLUTION RESPIRATORY (INHALATION) at 19:44

## 2022-03-26 RX ADMIN — METHYLPREDNISOLONE SODIUM SUCCINATE 125 MG: 125 INJECTION, POWDER, FOR SOLUTION INTRAMUSCULAR; INTRAVENOUS at 19:50

## 2022-03-26 RX ADMIN — GABAPENTIN 600 MG: 600 TABLET, FILM COATED ORAL at 23:50

## 2022-03-26 RX ADMIN — AZITHROMYCIN MONOHYDRATE 500 MG: 500 INJECTION, POWDER, LYOPHILIZED, FOR SOLUTION INTRAVENOUS at 21:21

## 2022-03-26 RX ADMIN — IPRATROPIUM BROMIDE AND ALBUTEROL SULFATE 3 ML: 2.5; .5 SOLUTION RESPIRATORY (INHALATION) at 20:26

## 2022-03-26 RX ADMIN — CEFTRIAXONE 1 G: 1 INJECTION, POWDER, FOR SOLUTION INTRAMUSCULAR; INTRAVENOUS at 20:42

## 2022-03-26 RX ADMIN — ATORVASTATIN CALCIUM 20 MG: 10 TABLET, FILM COATED ORAL at 23:10

## 2022-03-26 RX ADMIN — IPRATROPIUM BROMIDE AND ALBUTEROL SULFATE 3 ML: 2.5; .5 SOLUTION RESPIRATORY (INHALATION) at 20:21

## 2022-03-26 RX ADMIN — SUCRALFATE 1 G: 1 TABLET ORAL at 23:10

## 2022-03-26 ASSESSMENT — ENCOUNTER SYMPTOMS
COUGH: 1
ABDOMINAL PAIN: 0
FREQUENCY: 0
FEVER: 0
SHORTNESS OF BREATH: 1
CHILLS: 1
CONSTIPATION: 0
NAUSEA: 0
DIARRHEA: 0
VOMITING: 0
HEMATURIA: 0
DYSURIA: 0

## 2022-03-26 ASSESSMENT — ACTIVITIES OF DAILY LIVING (ADL)
ADLS_ACUITY_SCORE: 3
ADLS_ACUITY_SCORE: 3

## 2022-03-26 NOTE — TELEPHONE ENCOUNTER
Nurse Triage SBAR    Is this a 2nd Level Triage? NO    Situation: Patient called to request prednisone, antibiotic, and cough suppressant to be refilled.    Background: Patient was prescribed medications on 3/14/22 for acute bronchitis.  She has a history of COPD.  Patient is on home oxygen.    Assessment:She reports she had felt better after antibiotics and prednisone, but began coughing up green sputum again.  She denies a fever currently or chest pain.  Patient reports she doesn't have oxygen on currently because it is in her bedroom.  She reports her current oxygen reading is 80%.  She reports she usually uses 4L of oxygen.  She reports difficulty breathing.  Patient applied oxygen and advised her oxygen level had increased to 89%.      Protocol Recommended Disposition:   Go to ED Now    Recommendation: Advised patient to have someone drive her to ED now.    Care advice given. Patient verbalizes understanding and agrees with plan of care. She reports she plans to have her son drive her to [facility] ED.     Sherie Falcon RN  03/26/22 5:41 PM  [facility] Nurse Advisor    COVID 19 Nurse Triage Plan/Patient Instructions    Please be aware that novel coronavirus (COVID-19) may be circulating in the community. If you develop symptoms such as fever, cough, or SOB or if you have concerns about the presence of another infection including coronavirus (COVID-19), please contact your health care provider or visit [email or web address].     Disposition/Instructions    ED Visit recommended. Follow protocol based instructions.     Bring Your Own Device:  Please also bring your smart device(s) (smart phones, tablets, laptops) and their charging cables for your personal use and to communicate with your care team during your visit.    Thank you for taking steps to prevent the spread of this virus.  o Limit your contact with others.  o Wear a simple mask to cover your cough.  o Wash your hands well and  often.    Resources    [facility]: About COVID-19: [email or web address]    CDC: What to Do If You're Sick: www.cdc.gov/coronavirus/2019-ncov/about/steps-when-sick.html    CDC: Ending Home Isolation: www.cdc.gov/coronavirus/2019-ncov/hcp/disposition-in-home-patients.html     CDC: Caring for Someone: www.cdc.gov/coronavirus/2019-ncov/if-you-are-sick/care-for-someone.html     [facility]: Interim Guidance for Hospital Discharge to Home: [email or web address]/[email or web address]    [facility] clinical trials (COVID-19 research studies): [email or web address].[email or web address]-clinical-trials     Below are the COVID-19 hotlines at the [place] Department of [facility] ([facility]). Interpreters are available.   o For health questions: Call 158-594-2498 or 1-660.229.9112 (7 a.m. to 7 p.m.)  o For questions about schools and childcare: Call 671-986-6772 or 1-417.562.9109 (7 a.m. to 7 p.m.)        Reason for Disposition    Difficulty breathing    Additional Information    Negative: Severe difficulty breathing (e.g., struggling for each breath, speaks in single words)    Negative: Bluish (or gray) lips or face now    Negative: [1] Difficulty breathing AND [2] exposure to flames, smoke, or fumes    Negative: [1] Stridor AND [2] difficulty breathing    Negative: Sounds like a life-threatening emergency to the triager    Negative: [1] Previous asthma attacks AND [2] this feels like asthma attack    Negative: Dry (non-productive) cough (i.e., no sputum or minimal clear sputum)    Negative: Chest pain  (Exception: MILD central chest pain, present only when coughing)    Protocols used: COUGH - ACUTE [id number]

## 2022-03-27 ENCOUNTER — APPOINTMENT (OUTPATIENT)
Dept: CARDIOLOGY | Facility: CLINIC | Age: 72
DRG: 193 | End: 2022-03-27
Attending: INTERNAL MEDICINE
Payer: COMMERCIAL

## 2022-03-27 PROBLEM — J96.22 ACUTE AND CHRONIC RESPIRATORY FAILURE WITH HYPERCAPNIA (H): Status: ACTIVE | Noted: 2022-03-27

## 2022-03-27 LAB
ALBUMIN SERPL-MCNC: 2.9 G/DL (ref 3.5–5)
ALP SERPL-CCNC: 84 U/L (ref 45–120)
ALT SERPL W P-5'-P-CCNC: 14 U/L (ref 0–45)
ANION GAP SERPL CALCULATED.3IONS-SCNC: 9 MMOL/L (ref 5–18)
AST SERPL W P-5'-P-CCNC: 18 U/L (ref 0–40)
ATRIAL RATE - MUSE: 107 BPM
BASE EXCESS BLDV CALC-SCNC: 7.6 MMOL/L
BASOPHILS # BLD AUTO: 0 10E3/UL (ref 0–0.2)
BASOPHILS NFR BLD AUTO: 0 %
BILIRUB SERPL-MCNC: 0.2 MG/DL (ref 0–1)
BUN SERPL-MCNC: 16 MG/DL (ref 8–28)
C PNEUM DNA SPEC QL NAA+PROBE: NOT DETECTED
CALCIUM SERPL-MCNC: 9.3 MG/DL (ref 8.5–10.5)
CHLORIDE BLD-SCNC: 104 MMOL/L (ref 98–107)
CO2 SERPL-SCNC: 27 MMOL/L (ref 22–31)
CREAT SERPL-MCNC: 0.65 MG/DL (ref 0.6–1.1)
DIASTOLIC BLOOD PRESSURE - MUSE: 66 MMHG
EOSINOPHIL # BLD AUTO: 0 10E3/UL (ref 0–0.7)
EOSINOPHIL NFR BLD AUTO: 0 %
ERYTHROCYTE [DISTWIDTH] IN BLOOD BY AUTOMATED COUNT: 17.5 % (ref 10–15)
FLUAV H1 2009 PAND RNA SPEC QL NAA+PROBE: NOT DETECTED
FLUAV H1 RNA SPEC QL NAA+PROBE: NOT DETECTED
FLUAV H3 RNA SPEC QL NAA+PROBE: NOT DETECTED
FLUAV RNA SPEC QL NAA+PROBE: NOT DETECTED
FLUBV RNA SPEC QL NAA+PROBE: NOT DETECTED
GFR SERPL CREATININE-BSD FRML MDRD: >90 ML/MIN/1.73M2
GLUCOSE BLD-MCNC: 192 MG/DL (ref 70–125)
GLUCOSE BLDC GLUCOMTR-MCNC: 148 MG/DL (ref 70–99)
GLUCOSE BLDC GLUCOMTR-MCNC: 155 MG/DL (ref 70–99)
GLUCOSE BLDC GLUCOMTR-MCNC: 172 MG/DL (ref 70–99)
GLUCOSE BLDC GLUCOMTR-MCNC: 187 MG/DL (ref 70–99)
GLUCOSE BLDC GLUCOMTR-MCNC: 219 MG/DL (ref 70–99)
HADV DNA SPEC QL NAA+PROBE: NOT DETECTED
HCO3 BLDV-SCNC: 30 MMOL/L (ref 24–30)
HCOV PNL SPEC NAA+PROBE: NOT DETECTED
HCT VFR BLD AUTO: 31 % (ref 35–47)
HGB BLD-MCNC: 9.1 G/DL (ref 11.7–15.7)
HMPV RNA SPEC QL NAA+PROBE: NOT DETECTED
HOLD SPECIMEN: NORMAL
HPIV1 RNA SPEC QL NAA+PROBE: NOT DETECTED
HPIV2 RNA SPEC QL NAA+PROBE: NOT DETECTED
HPIV3 RNA SPEC QL NAA+PROBE: NOT DETECTED
HPIV4 RNA SPEC QL NAA+PROBE: NOT DETECTED
IMM GRANULOCYTES # BLD: 0 10E3/UL
IMM GRANULOCYTES NFR BLD: 0 %
INR PPP: 3.47 (ref 0.85–1.15)
INTERPRETATION ECG - MUSE: NORMAL
L PNEUMO1 AG UR QL IA: NEGATIVE
LVEF ECHO: NORMAL
LYMPHOCYTES # BLD AUTO: 0.5 10E3/UL (ref 0.8–5.3)
LYMPHOCYTES NFR BLD AUTO: 5 %
M PNEUMO DNA SPEC QL NAA+PROBE: NOT DETECTED
MCH RBC QN AUTO: 24.1 PG (ref 26.5–33)
MCHC RBC AUTO-ENTMCNC: 29.4 G/DL (ref 31.5–36.5)
MCV RBC AUTO: 82 FL (ref 78–100)
MONOCYTES # BLD AUTO: 0.2 10E3/UL (ref 0–1.3)
MONOCYTES NFR BLD AUTO: 3 %
NEUTROPHILS # BLD AUTO: 8.3 10E3/UL (ref 1.6–8.3)
NEUTROPHILS NFR BLD AUTO: 92 %
NRBC # BLD AUTO: 0 10E3/UL
NRBC BLD AUTO-RTO: 0 /100
OXYHGB MFR BLDV: 91.7 % (ref 70–75)
P AXIS - MUSE: 99 DEGREES
PCO2 BLDV: 51 MM HG (ref 35–50)
PH BLDV: 7.41 [PH] (ref 7.35–7.45)
PLATELET # BLD AUTO: 194 10E3/UL (ref 150–450)
PO2 BLDV: 72 MM HG (ref 25–47)
POTASSIUM BLD-SCNC: 4.2 MMOL/L (ref 3.5–5)
PR INTERVAL - MUSE: 156 MS
PROCALCITONIN SERPL-MCNC: 0.14 NG/ML (ref 0–0.49)
PROT SERPL-MCNC: 6.8 G/DL (ref 6–8)
QRS DURATION - MUSE: 106 MS
QT - MUSE: 338 MS
QTC - MUSE: 451 MS
R AXIS - MUSE: -64 DEGREES
RBC # BLD AUTO: 3.77 10E6/UL (ref 3.8–5.2)
RSV RNA SPEC QL NAA+PROBE: NOT DETECTED
RSV RNA SPEC QL NAA+PROBE: NOT DETECTED
RV+EV RNA SPEC QL NAA+PROBE: NOT DETECTED
S PNEUM AG SPEC QL: POSITIVE
SAO2 % BLDV: 92.9 % (ref 70–75)
SODIUM SERPL-SCNC: 140 MMOL/L (ref 136–145)
SYSTOLIC BLOOD PRESSURE - MUSE: 149 MMHG
T AXIS - MUSE: 87 DEGREES
TROPONIN I SERPL-MCNC: 0.05 NG/ML (ref 0–0.29)
VENTRICULAR RATE- MUSE: 107 BPM
WBC # BLD AUTO: 9.1 10E3/UL (ref 4–11)

## 2022-03-27 PROCEDURE — 255N000002 HC RX 255 OP 636: Performed by: HOSPITALIST

## 2022-03-27 PROCEDURE — 87205 SMEAR GRAM STAIN: CPT | Performed by: INTERNAL MEDICINE

## 2022-03-27 PROCEDURE — 94640 AIRWAY INHALATION TREATMENT: CPT

## 2022-03-27 PROCEDURE — 84484 ASSAY OF TROPONIN QUANT: CPT | Performed by: INTERNAL MEDICINE

## 2022-03-27 PROCEDURE — 82805 BLOOD GASES W/O2 SATURATION: CPT | Performed by: INTERNAL MEDICINE

## 2022-03-27 PROCEDURE — 94660 CPAP INITIATION&MGMT: CPT

## 2022-03-27 PROCEDURE — 99291 CRITICAL CARE FIRST HOUR: CPT | Performed by: HOSPITALIST

## 2022-03-27 PROCEDURE — 250N000013 HC RX MED GY IP 250 OP 250 PS 637: Performed by: INTERNAL MEDICINE

## 2022-03-27 PROCEDURE — 87899 AGENT NOS ASSAY W/OPTIC: CPT | Performed by: INTERNAL MEDICINE

## 2022-03-27 PROCEDURE — 250N000009 HC RX 250: Performed by: INTERNAL MEDICINE

## 2022-03-27 PROCEDURE — 36415 COLL VENOUS BLD VENIPUNCTURE: CPT | Performed by: INTERNAL MEDICINE

## 2022-03-27 PROCEDURE — 93306 TTE W/DOPPLER COMPLETE: CPT | Mod: 26 | Performed by: INTERNAL MEDICINE

## 2022-03-27 PROCEDURE — 85025 COMPLETE CBC W/AUTO DIFF WBC: CPT | Performed by: INTERNAL MEDICINE

## 2022-03-27 PROCEDURE — 80053 COMPREHEN METABOLIC PANEL: CPT | Performed by: INTERNAL MEDICINE

## 2022-03-27 PROCEDURE — 210N000002 HC R&B HEART CARE

## 2022-03-27 PROCEDURE — 84145 PROCALCITONIN (PCT): CPT | Performed by: HOSPITALIST

## 2022-03-27 PROCEDURE — 250N000011 HC RX IP 250 OP 636: Performed by: INTERNAL MEDICINE

## 2022-03-27 PROCEDURE — 94640 AIRWAY INHALATION TREATMENT: CPT | Mod: 76

## 2022-03-27 PROCEDURE — 999N000157 HC STATISTIC RCP TIME EA 10 MIN

## 2022-03-27 PROCEDURE — 999N000208 ECHOCARDIOGRAM COMPLETE

## 2022-03-27 PROCEDURE — 258N000003 HC RX IP 258 OP 636: Performed by: INTERNAL MEDICINE

## 2022-03-27 PROCEDURE — 85610 PROTHROMBIN TIME: CPT | Performed by: HOSPITALIST

## 2022-03-27 PROCEDURE — 36415 COLL VENOUS BLD VENIPUNCTURE: CPT | Performed by: HOSPITALIST

## 2022-03-27 RX ORDER — BENZONATATE 100 MG/1
100 CAPSULE ORAL 3 TIMES DAILY PRN
Status: DISCONTINUED | OUTPATIENT
Start: 2022-03-27 | End: 2022-03-31 | Stop reason: HOSPADM

## 2022-03-27 RX ORDER — IPRATROPIUM BROMIDE AND ALBUTEROL SULFATE 2.5; .5 MG/3ML; MG/3ML
3 SOLUTION RESPIRATORY (INHALATION)
Status: DISCONTINUED | OUTPATIENT
Start: 2022-03-27 | End: 2022-03-31 | Stop reason: HOSPADM

## 2022-03-27 RX ORDER — MAGNESIUM SULFATE 4 G/50ML
4 INJECTION INTRAVENOUS ONCE
Status: COMPLETED | OUTPATIENT
Start: 2022-03-27 | End: 2022-03-27

## 2022-03-27 RX ORDER — MECLIZINE HYDROCHLORIDE 25 MG/1
25 TABLET ORAL 3 TIMES DAILY PRN
Status: DISCONTINUED | OUTPATIENT
Start: 2022-03-27 | End: 2022-03-31 | Stop reason: HOSPADM

## 2022-03-27 RX ORDER — ALBUTEROL SULFATE 90 UG/1
1-2 AEROSOL, METERED RESPIRATORY (INHALATION) EVERY 4 HOURS PRN
Status: DISCONTINUED | OUTPATIENT
Start: 2022-03-27 | End: 2022-03-31 | Stop reason: HOSPADM

## 2022-03-27 RX ADMIN — IPRATROPIUM BROMIDE AND ALBUTEROL SULFATE 3 ML: 2.5; .5 SOLUTION RESPIRATORY (INHALATION) at 11:33

## 2022-03-27 RX ADMIN — DILTIAZEM HYDROCHLORIDE 120 MG: 120 CAPSULE, COATED, EXTENDED RELEASE ORAL at 09:26

## 2022-03-27 RX ADMIN — IPRATROPIUM BROMIDE AND ALBUTEROL SULFATE 3 ML: 2.5; .5 SOLUTION RESPIRATORY (INHALATION) at 15:58

## 2022-03-27 RX ADMIN — GABAPENTIN 600 MG: 600 TABLET, FILM COATED ORAL at 19:48

## 2022-03-27 RX ADMIN — PANTOPRAZOLE SODIUM 40 MG: 20 TABLET, DELAYED RELEASE ORAL at 05:49

## 2022-03-27 RX ADMIN — FLUTICASONE FUROATE AND VILANTEROL TRIFENATATE 1 PUFF: 200; 25 POWDER RESPIRATORY (INHALATION) at 09:28

## 2022-03-27 RX ADMIN — METHYLPREDNISOLONE SODIUM SUCCINATE 62.5 MG: 125 INJECTION, POWDER, FOR SOLUTION INTRAMUSCULAR; INTRAVENOUS at 09:26

## 2022-03-27 RX ADMIN — SUCRALFATE 1 G: 1 TABLET ORAL at 14:04

## 2022-03-27 RX ADMIN — METHYLPREDNISOLONE SODIUM SUCCINATE 62.5 MG: 125 INJECTION, POWDER, FOR SOLUTION INTRAMUSCULAR; INTRAVENOUS at 19:46

## 2022-03-27 RX ADMIN — SUCRALFATE 1 G: 1 TABLET ORAL at 16:23

## 2022-03-27 RX ADMIN — IPRATROPIUM BROMIDE AND ALBUTEROL SULFATE 3 ML: 2.5; .5 SOLUTION RESPIRATORY (INHALATION) at 21:15

## 2022-03-27 RX ADMIN — GABAPENTIN 600 MG: 600 TABLET, FILM COATED ORAL at 14:04

## 2022-03-27 RX ADMIN — ATORVASTATIN CALCIUM 20 MG: 10 TABLET, FILM COATED ORAL at 21:44

## 2022-03-27 RX ADMIN — PERFLUTREN 3 ML: 6.52 INJECTION, SUSPENSION INTRAVENOUS at 09:03

## 2022-03-27 RX ADMIN — AZITHROMYCIN MONOHYDRATE 500 MG: 500 INJECTION, POWDER, LYOPHILIZED, FOR SOLUTION INTRAVENOUS at 21:45

## 2022-03-27 RX ADMIN — SUCRALFATE 1 G: 1 TABLET ORAL at 05:50

## 2022-03-27 RX ADMIN — CEFTRIAXONE 1 G: 1 INJECTION, POWDER, FOR SOLUTION INTRAMUSCULAR; INTRAVENOUS at 19:48

## 2022-03-27 RX ADMIN — IPRATROPIUM BROMIDE AND ALBUTEROL SULFATE 3 ML: 2.5; .5 SOLUTION RESPIRATORY (INHALATION) at 08:58

## 2022-03-27 RX ADMIN — GABAPENTIN 600 MG: 600 TABLET, FILM COATED ORAL at 09:26

## 2022-03-27 RX ADMIN — MAGNESIUM SULFATE HEPTAHYDRATE 4 G: 80 INJECTION, SOLUTION INTRAVENOUS at 02:45

## 2022-03-27 RX ADMIN — SUCRALFATE 1 G: 1 TABLET ORAL at 21:44

## 2022-03-27 ASSESSMENT — ACTIVITIES OF DAILY LIVING (ADL)
ADLS_ACUITY_SCORE: 7
ADLS_ACUITY_SCORE: 7
ADLS_ACUITY_SCORE: 5
ADLS_ACUITY_SCORE: 7
ADLS_ACUITY_SCORE: 5
ADLS_ACUITY_SCORE: 5
ADLS_ACUITY_SCORE: 7
ADLS_ACUITY_SCORE: 5
DEPENDENT_IADLS:: CLEANING;COOKING;LAUNDRY;SHOPPING;MEAL PREPARATION;TRANSPORTATION
ADLS_ACUITY_SCORE: 5
ADLS_ACUITY_SCORE: 7
ADLS_ACUITY_SCORE: 5
ADLS_ACUITY_SCORE: 5
ADLS_ACUITY_SCORE: 7
ADLS_ACUITY_SCORE: 5
ADLS_ACUITY_SCORE: 5
ADLS_ACUITY_SCORE: 7
ADLS_ACUITY_SCORE: 5
ADLS_ACUITY_SCORE: 7

## 2022-03-27 ASSESSMENT — ENCOUNTER SYMPTOMS
SHORTNESS OF BREATH: 1
PSYCHIATRIC NEGATIVE: 1
ENDOCRINE NEGATIVE: 1
BACK PAIN: 1
UNEXPECTED WEIGHT CHANGE: 1
ABDOMINAL PAIN: 0
ACTIVITY CHANGE: 1
POLYDIPSIA: 0
ALLERGIC/IMMUNOLOGIC NEGATIVE: 1
COUGH: 1
CHILLS: 0
FEVER: 0
HEMATOLOGIC/LYMPHATIC NEGATIVE: 1
WEAKNESS: 1

## 2022-03-27 NOTE — ED TRIAGE NOTES
Pt arrives to ED with oxygen at 60% on room air states she is always on 4L of nasal cannula at home due to her COPD but never needs it out of the house. C/o shortness of breath. Pt states she was sent here for antibiotics but not sure for what. Pt has significant productive cough but states Covid negative an hour ago with home test. 80% on 6L.

## 2022-03-27 NOTE — PROVIDER NOTIFICATION
03/27/22 0100   Tech Time   $Tech Time (10 minute increments) 2   CPAP/BiPAP/Settings   $CPAP/BiPAP Subsequent completed   BIPAP/CPAP On Standby On   IPAP/EPAP (cmH2O) 10/5   Rate (breaths/min) 16   Oxygen (%) 50   Timed Inspiration (sec) 0.9   IPAP RISE  Settings (V60) 2   CPAP/BiPAP Patient Parameters   IPAP (cm H2O) 10 cmH2O   EPAP (cm H2O) 5 cmH2O   Pressure Support (cm H2O) 5 cmH2O   RR Total (breaths/min) 23 breaths/min   Vt (mL) 471 mL   Minute Ventilation (L/min) 10.5 L/min   Peak Inspiratory Pressure (cm H2O) 10 cmH2O   Pt.  Leak (L/min) 5 L/min   CPAP/BiPAP/AVAPS/AVAPS AE Alarms   High Pressure (cm H2O) 20 cmH2O   Low Pressure (cm H2O) 8   Apnea (sec) 30   High Rate (breaths/min) 50 breaths/min   Low Rate (breaths/min) 8   Audible Alarm set at (Volume of Alarm) 6   CPAP/BiPAP/AVAPS/AVAPS AE Patient Assessment   Interface Face Mask - Medium   Skin Integrity good   RT Device Skin Assessment   Oxygen Delivery Device CPAP/BiPAP Mask   Strap Tightness Finger Allowance between head and device strap   Device Skin Interventions Taken No adjustments needed      Never smoker

## 2022-03-27 NOTE — PLAN OF CARE
"  Problem: Risk for Delirium  Goal: Improved Sleep  Outcome: Ongoing, Progressing     Problem: Adjustment to Illness COPD (Chronic Obstructive Pulmonary Disease)  Goal: Optimal Chronic Illness Coping  Outcome: Ongoing, Progressing     Problem: Functional Ability Impaired COPD (Chronic Obstructive Pulmonary Disease)  Goal: Optimal Level of Functional [place]  Outcome: Ongoing, Progressing     Problem: Infection COPD (Chronic Obstructive Pulmonary Disease)  Goal: Absence of Infection Signs and Symptoms  Outcome: Ongoing, Progressing   Goal Outcome Evaluation:      /60 (BP Location: Left arm)   Pulse 62   Temp 98.6  F (37  C) (Temporal)   Resp 16   Ht 1.651 m (5' 5\")   Wt 68.3 kg (150 lb 9.6 oz)   SpO2 95%   BMI 25.06 kg/m                 "

## 2022-03-27 NOTE — PROGRESS NOTES
[facility] MEDICINE PROGRESS NOTE      Identification/Summary: Mary Kay Tejada is a 72 year old female with a past medical history of COPD, HTN, HLD, afib on warfarin, moderate aortic stenosis, history of HFpEF who was admitted on 3/26/2022 for acute on chronic hypoxic respiratory failure and acute hypercapnic respiratory failure from COPD acute exacerbation requiring BiPAP. Hospital course is notable for utilizing BiPAP. Now on 8 L and last blood gas demonstrates normal and compensated pH 7.41, pCO2 noted at 51 with normal pH c/w compensated and at baseline, chronic retainer. Appears she was admitted with treatment for possible bacterial PNA as well; continue ceftriaxone and azithromycin for now and check procalcitonin.    Assessment and Plan:  Acute COPD Exacerbation  Acute on Chronic Hypoxic Respiratory Failure requiring BiPAP (baseline is 4 L)  Acute Hypercapnic/Hypercarbic Respiratory Failure, requiring BiPAP resolved/improving (pH 7.41 now on blood gas)  -Utilize IV steroids and then transition to PO once clinically improved.  -Duonebs ordered scheduled initially and then transition/wean as per RT as per protocol pending clinical improvement.  -Utilize azithromycin for anti-inflammatory adjunctive treatment and transition to PO once clinically improved.   -Additional nebulizer as needed.   -Oxygen O2 supplementation to maintain O2 >88%. O2 < 94%. Goal 89-93%. Wean O2 as tolerated.  -Appreciate RT support and care.  -If coughing symptoms are severe, would utilize benzonatate tessalon pearls, dextromethorphan as needed for suppressing cough symptoms; consider additional as needed and judicious use of codeine and/or guaifenesin as appropriate.     Infiltrate seen on CT Chest  Possible CAP PNA  -Continue ceftriaxone and azithromycin for now and check procalcitonin.    HTN  -Order home medications and as needed apply specified hold parameters.     HLD  -Order home statin.    Atrial  "Fibrillation   -Optimize potassium and magnesium. Keep K > 4.0, Mag > 2.0 ideally.   -Order home medications including AV-christiane rate control medication.  -Recommend resuming home anticoagulation with warfarin/DOAC.  -If any acute sustained rapid rates develop, obtain EKG to evaluate for RVR and initiate cardiac telemetry and update [facility] team.     Clinically Significant Risk Factors Present on Admission             # Hypomagnesemia: Mg = 1.4 mg/dL (Ref range: 1.8 - 2.6 mg/dL) on admission, will replace as needed   # Hypoalbuminemia: Albumin = 2.9 g/dL (Ref range: 3.5 - 5.0 g/dL) on admission, will monitor as appropriate   # Coagulation Defect: home medication list includes an anticoagulant medication    # Overweight: Estimated body mass index is 25.06 kg/m  as calculated from the following:    Height as of this encounter: 1.651 m (5' 5\").    Weight as of this encounter: 68.3 kg (150 lb 9.6 oz).      Diet: Moderate Consistent Carb (60 g CHO per Meal) Diet  DVT Prophylaxis:  Warfarin  Code Status: Full Code    Anticipated possible discharge in 1-2 days to TBD once wean O2, off BiPAP, PT/OT evals milestones are met.  Disposition Plan   Expected Discharge:    Anticipated discharge location:  Awaiting care coordination huddle  Delays:          The patient's care was discussed with the patient, patient's family, RN/charge RN.    > 40 min for direct critical care time for acute evaluation, management, treatment, and stabilization of acute on chronic respiratory failure and acute hypercapnic respiratory failure from COPD acute exacerbation requiring BiPAP. Patient has critical illness and impaired breathing on supplemental O2 demonstrating a high probability of imminent life-threatening deterioration with this patient's critical condition.    Quintin Mendoza MD  [facility]  [facility]  [facility]  Phone: #[phone]    Interval History/Subjective:  Required BiPAP overnight. Weaning, to 8 L " oxymask. She is fully alert and oriented. Blood gas, pH 7.41, Co2 51.   Her son Heriberto at bedside. No chest pain. No fever. Discussed plan of care with Betsy and Heriberto. Answered all questions to both of their verbalized and stated understanding and satisfaction.      Physical Exam/Objective:  Temp:  [98.6  F (37  C)-99.4  F (37.4  C)] 98.6  F (37  C)  Pulse:  [] 62  Resp:  [16-31] 16  BP: (112-149)/(50-67) 132/60  FiO2 (%):  [50 %] 50 %  SpO2:  [60 %-96 %] 90 %  Body mass index is 25.06 kg/m .    GENERAL:  Alert, appears comfortable, in no acute distress, appears stated age, now on 8 L Oxymask   HEAD:  Normocephalic, without obvious abnormality, atraumatic   EYES:  PERRL, conjunctiva/corneas clear, no scleral icterus, EOM's intact   NOSE: Nares normal, septum midline, mucosa normal, no drainage   THROAT: Lips, mucosa, and tongue normal; teeth and gums normal, mouth moist   NECK: Supple, symmetrical, trachea midline   BACK:   Symmetric, no curvature, ROM normal   LUNGS:   Significant diffuse inspiratory and expiratory wheezing, symmetric chest rise on inhalation, respirations unlabored   CHEST WALL:  No tenderness or deformity   HEART:  Regular rate and rhythm, S1 and S2 normal, no murmur, rub, or gallop    ABDOMEN:   Soft, non-tender, bowel sounds active all four quadrants, no masses, no organomegaly, no rebound or guarding   EXTREMITIES: Extremities normal, atraumatic, no cyanosis or edema    SKIN: Dry to touch, no exanthems in the visualized areas   NEURO: Alert, oriented x3, moves all four extremities freely   PSYCH: Cooperative, behavior is appropriate      Data reviewed today: I personally reviewed all new medications, labs, imaging/diagnostics reports over the past 24 hours. Pertinent findings include:    Imaging:   Recent Results (from the past 24 hour(s))   XR Chest Port 1 View    Narrative    EXAM: XR CHEST PORT 1 VIEW  LOCATION: [facility]  DATE/TIME: 3/26/2022 8:14  PM    INDICATION: dyspnea  COMPARISON: 06/03/2019      Impression    IMPRESSION: Hyperinflation. Some mild increased opacity right lung base could represent a mild pneumonia. More prominent than prior study. Some chronic atelectasis right midlung.   CT Chest w/o Contrast    Narrative    EXAM: CT CHEST WITHOUT IV CONTRAST  LOCATION: [facility]  DATE/TIME: 3/26/2022 10:20 PM    INDICATION: Persistent cough.  COMPARISON:   1. Portable chest single view 03/26/2022.  2. CT abdomen and pelvis with IV contrast 11/13/2012.  TECHNIQUE: CT chest without IV contrast. Multiplanar reformats were obtained. Dose reduction techniques were used.  CONTRAST: None.    FINDINGS:   LUNGS AND PLEURA: Mild emphysematous changes. Subtle infiltrate in the right upper lobe, and both lower lobes, likely representing an infectious or an inflammatory process. Diffuse thickening of the bronchi. No cavitation or pleural effusion on either   side.    MEDIASTINUM/AXILLAE: Normal cardiac size. Dense coronary artery and aortic valvular calcifications. No pericardial effusion. No suspicious adenopathy. Trachea is midline.    CORONARY ARTERY CALCIFICATION: Severe.    UPPER ABDOMEN: Left adrenal hypodense adenoma measures 2.7 x 1.7 cm (image 152, series 4), unchanged. Vascular calcifications.    MUSCULOSKELETAL: Demineralization of the visualized bones. Degenerative changes both shoulders and the spine. Right convex thoracic curve. Distal left rotator cuff calcific arthropathy.      Impression    IMPRESSION:   1.  Subtle infiltrate in the right upper lobe and both lower lobes, likely representing an infectious or an inflammatory process. Diffuse thickening of the bronchi. No cavitation, suspicious adenopathy or pleural effusion on either side. Mild   emphysematous changes.    2.  Normal cardiac size. Dense coronary artery and aortic valvular calcifications. No pericardial effusion.    3.  Left adrenal hypodense benign adenoma,  stable.    4.  Demineralization of the visualized bones. Degenerative changes both shoulders and the spine. Right convex thoracic curve. Distal left rotator cuff calcific arthropathy.         Labs:  Most Recent 3 CBC's:Recent Labs   Lab Test 03/27/22 0527 03/26/22 1946 12/24/21 0756   WBC 9.1 8.9 7.2   HGB 9.1* 9.9* 11.0*   MCV 82 81 83    199 254     Most Recent 3 BMP's:Recent Labs   Lab Test 03/27/22  0813 03/27/22 0527 03/27/22 0402 03/26/22  2250 03/26/22 1946 12/24/21  0756   NA  --  140  --   --  139 144   POTASSIUM  --  4.2  --   --  4.5 3.9   CHLORIDE  --  104  --   --  101 102   CO2  --  27  --   --  27 28   BUN  --  16  --   --  16 13   CR  --  0.65  --   --  0.69 0.64   ANIONGAP  --  9  --   --  11 14   JOEY  --  9.3  --   --  9.7 9.6   * 192* 187*   < > 140* 70    < > = values in this interval not displayed.     Most Recent 2 LFT's:Recent Labs   Lab Test 03/27/22 0527 03/26/22 1946   AST 18 22   ALT 14 15   ALKPHOS 84 93   BILITOTAL 0.2 0.3     Anticoagulation Dose History     Recent Dosing and Labs Latest Ref Rng & Units 11/22/2021 12/24/2021 1/21/2022 2/21/2022 3/22/2022 3/26/2022 3/27/2022    INR 0.85 - 1.15 2.2(H) 3.1(H) 2.1(H) 1.8(H) 1.8(H) 3.00(H) 3.47(H)            Medications:   Personally Reviewed.  Medications     - MEDICATION INSTRUCTIONS -         atorvastatin  20 mg Oral At Bedtime     azithromycin  500 mg Intravenous Q24H     cefTRIAXone  1 g Intravenous Q24H     diltiazem ER COATED BEADS  120 mg Oral Daily     fluticasone-vilanterol  1 puff Inhalation Daily     gabapentin  600 mg Oral TID     insulin aspart  1-7 Units Subcutaneous TID AC     insulin aspart  1-5 Units Subcutaneous At Bedtime     ipratropium - albuterol 0.5 mg/2.5 mg/3 mL  3 mL Nebulization 4x daily     methylPREDNISolone  62.5 mg Intravenous Q12H     pantoprazole  40 mg Oral QAM AC     sodium chloride (PF)  3 mL Intracatheter Q8H     sucralfate  1 g Oral 4x Daily AC & HS     warfarin-No DOSE today  1  each Does not apply no dose today (warfarin)

## 2022-03-27 NOTE — PHARMACY-ADMISSION MEDICATION HISTORY
Pharmacy Note - Admission Medication History    Pertinent Provider Information:    ______________________________________________________________________    Prior To Admission (PTA) med list completed and updated in EMR.       PTA Med List   Medication Sig Last Dose     albuterol (PROAIR HFA;PROVENTIL HFA;VENTOLIN HFA) 90 mcg/actuation inhaler [ALBUTEROL (PROAIR HFA;PROVENTIL HFA;VENTOLIN HFA) 90 MCG/ACTUATION INHALER] INHALE 2 PUFFS EVERY 4 HOURS AS NEEDED WHEEZING 3/26/2022 at not with     atorvastatin (LIPITOR) 20 MG tablet TAKE 1 TABLET(20 MG) BY MOUTH AT BEDTIME 3/25/2022 at Unknown time     cyclobenzaprine (FLEXERIL) 10 MG tablet Take 1 tablet (10 mg) by mouth nightly as needed for muscle spasms  at prn     diltiazem ER COATED BEADS (CARDIZEM CD/CARTIA XT) 120 MG 24 hr capsule TAKE 1 CAPSULE(120 MG) BY MOUTH DAILY 3/26/2022 at Unknown time     furosemide (LASIX) 20 MG tablet Take 1 tablet (20 mg) by mouth daily as needed  at prn     gabapentin (NEURONTIN) 600 MG tablet Take 1 tablet (600 mg) by mouth 3 times daily 3/26/2022 at Unknown time     guaiFENesin-codeine (ROBITUSSIN AC) 100-10 MG/5ML solution Take 5-10 mLs by mouth every 4 hours as needed for cough 3/26/2022 at Unknown time     meclizine (ANTIVERT) 25 MG tablet Take 1 tablet (25 mg) by mouth 3 times daily as needed for dizziness or nausea  at prn     metFORMIN (GLUCOPHAGE) 500 MG tablet TAKE 1 TABLET(500 MG) BY MOUTH TWICE DAILY WITH MEALS 3/26/2022 at Unknown time     naloxone (NARCAN) 4 MG/0.1ML nasal spray Spray 1 spray (4 mg) into one nostril alternating nostrils once as needed for opioid reversal every 2-3 minutes until assistance arrives      nicotine (NICODERM CQ) 21 MG/24HR 24 hr patch APPLY 1 PATCH EXTERNALLY TO THE SKIN DAILY 3/26/2022 at Unknown time     nystatin (NYSTOP) powder [NYSTATIN (NYSTOP) POWDER] Apply 1 application topically 2 (two) times a day as needed. 2-3 times to affected area(s).  at prn     omeprazole (PRILOSEC) 20 MG DR  capsule TAKE 1 CAPSULE(20 MG) BY MOUTH DAILY BEFORE BREAKFAST 3/26/2022 at Unknown time     oxyCODONE IR (ROXICODONE) 10 MG tablet Take 1 tablet (10 mg) by mouth every 6 hours as needed for moderate to severe pain  at prn     sucralfate (CARAFATE) 1 GM tablet TAKE 1 TABLET BY MOUTH FOUR TIMES DAILY(CRUSH TABLET AND MIX IT WITH A LITTLE WATER, THEN SWALLOW) 3/26/2022 at Unknown time     SYMBICORT 160-4.5 MCG/ACT Inhaler INHALE 2 PUFFS BY MOUTH TWICE DAILY 3/26/2022 at not with     warfarin ANTICOAGULANT (COUMADIN) 5 MG tablet TAKE 1/2 TO 1 TABLET BY MOUTH DAILY AS DIRECTED. ADJUST DOSE PER INR RESULTS (Patient taking differently: Take 2.5-5 mg by mouth See Admin Instructions 2.5mg Saturday; 5mg ROW) 3/25/2022 at Unknown time       Information source(s): Patient and CareEverywhere/SureScripts    Method of interview communication: in-person    Patient was asked about OTC/herbal products specifically.  PTA med list reflects this.    Based on the pharmacist's assessment, the PTA med list information appears reliable    Allergies were reviewed, assessed, and updated with the patient.      Patient did not bring any medications to the hospital and can't retrieve from home. No multi-dose medications are available for use during hospital stay.      Thank you for the opportunity to participate in the care of this patient.      Bhupendra Hubbard [facility]     3/26/2022     8:10 PM

## 2022-03-27 NOTE — H&P
"[facility] MEDICINE ADMISSION HISTORY AND PHYSICAL       Assessment & Plan      1. Acute SOB with O2 sat at mid 80s, initial requirement for BiPAP when she came in, now on FM and appeared comfortable. Recent completion of prednisone and antibiotic but off of it for a week, and now sick again. She reported still smoking \"hit and miss\", and losing weight.     - With COPD - Likely in acute exacerbation and will continue nebs and steroids  - With right sided PNA  - likely contributing and will continue antibiotics as empiric therapy, XR not impressive. We might have to broaden antibiotics if clinically not better in the next 12 hrs, given recent exposure to antibiotic.   - With Mod aortic stenosis - likely contributing to SOB as well, specially if she is tachycardic  - Low suspicion for PE as she has INR of 3  - r/o CHF - last EF was 60-65%     - The chest XR finding though was reported as mild PNA based on right lung opacity did not seem to correlate how she presented. Besides, those findings seem present even before,  unclear if there are other reasons for causing SOB.  Consider chest CT, aaliyah if she is losing weight and still smoking     - In addition to above, will check labs and if treatment not enough do BiPAP  - Tele and pulse ox  - we might have to repeat ECHO as her last was a year ago and she is symptomatic   - she won take her nicotine for now \"dont have urge to smoke\". Tobacco cessation   - sputum culture, urinary Ags and respiratory PCR      2. On coumadin for Afib, now in SR - coumadin care per pharm, INR of 2-3    3. Has DM2 - resume meds. If on metformin, holding due to risk of acidosis   - if sugar rising above 200, start lantus 10 units while on steroids     4. Has HTN and HL    5. Chronic pain issues - resume home meds       1055PM - Ches CT --- 1.  Subtle infiltrate in the right upper lobe and both lower lobes, likely representing an infectious or an inflammatory process. " Diffuse thickening of the bronchi. No cavitation, suspicious adenopathy or pleural effusion on either side. Mild emphysematous changes.      Med reconciliation -- Done   VTE prophylaxis: On coumadin   IV fluids: Per order set   Diet:  DM  Code Status: Full   COVID test result:  negative   COVID vaccination: completed   Barriers to discharge: admitting clinical condition  Discharge Disposition and goals:  Unable to determine at this point, pending clinical progress and response to treatment. Patient may need transfer to SNF or [facility] if unsafe to go home and needed treatment inappropriate at home setting OR may need home health care evaluation if care can be delivered at home settings. Consider referral to care manager/    PPE - I was wearing PPE when I met the patient - N95 mask, Surgical mask, Isolation gown, Gloves, Safety glasses.      Care plan was created based on available information provided, including patient's condition at the time of encounter.   This plan was discussed with patient and/or family members using layman's terms and have agreed to proceed.   At the end of night shift (9PM - 730A), this case will be presented to the AM Hospitalist.    It is recommended to revise care plan and review history if there is change in condition and/or new clinical information is not available during my encounter.     All or some of home medication/s were not resumed on admission due to safety reasons or contraindications. Dosing and frequency may also have been modified. Please resume/review them during your visit.     70 minutes of total visit duration and greater than 50% was spent in direct evaluation of patient and coordination of care including discussion of diagnostic test results and recommended treatment. .      Papo Boucher MD, MPH, FACP, [facility]  Internal Medicine - Hospitalist        Chief Complaint SOB      HISTORY     - She came in with SOB and cough. She called the careline and said that she  was on antibiotics and prednisone and felt better, now symptoms back with green sputum and worsening SOB.  She tested with COVID negative at home   - She has COPD and uses O2 at home at 4L. Her sat was 80% at home, ambe lower    - She has been off prednisone and antibiotics - for a week now, and was calling to get another round of antibiotic as she is getting sick again with cough and SOB. Some chest tightness. Not feverish    - She has inhalers and tried it without help/     - Her last ECHO Feb 21 - EF of  60-65%. Has moderate aortic stenosis. And mild to moderate aortic insufficiency.     - In the ED, chest XR showed mild PNA. She was started on Rocephin/Azithro. She was given multiple nebs and IV solumedrol.  She has INR of 3. She required BiPAP and got better     - ROS --- No headache. No dizziness. No palpitations. No abdominal pain. No nausea or vomiting. No urinary symptoms. No bleeding symptoms. No weight loss. Rest of 12 point ROS was reviewed and negative.       Past Medical History     Past Medical History:   Diagnosis Date     Anemia      Aortic stenosis      Atrial fibrillation (H)      Atrial flutter (H)      Benign neoplasm of adenomatous polyp     large intestine      Chronic constipation      Chronic pain syndrome      COPD (chronic obstructive pulmonary disease) (H)     Oxygen at night      Dependence on supplemental oxygen     Oxygen at noc, during the day as needed     Depression      Diabetes mellitus (H)      Dry eye syndrome      Fibromyalgia      Ganglion     left wrist     GERD (gastroesophageal reflux disease)      Hyperlipidemia      Hypertension      Hypokalemia      Infective otitis externa, unspecified     Created by Conversion      Larynx edema      Lung disease      Malignant neoplasm of vulva (H)     Created by Conversion [facility] Annotation: Apr 17 2007  8:24AM - Cammy Bui:  resection per Dr. Alfonso Mane 9/06;  Replacement Utility updated for latest IMO load      Medial epicondylitis      Onychomycosis      Osteoarthritis      Peptic ulcer      Polyneuropathy      Vulvar malignant neoplasm (H)          Surgical History     Past Surgical History:   Procedure Laterality Date     BIOPSY BREAST Right      BIOPSY BREAST Right 01/28/2015     BIOPSY BREAST Right 1/28/2015    Procedure: RIGHT BREAST BIOPSY AFTER WIRE LOCALIZATION AT 0940;  Surgeon: Renée Soriano MD;  Location: [facility] [place];  Service:      BIOPSY OF BREAST, INCISIONAL      Description: Incisional Breast Biopsy;  Recorded: 11/13/2007;  Comments: benign     COLONOSCOPY N/A 6/14/2019    Procedure: COLONOSCOPY;  Surgeon: Eduardo Mora MD;  Location: [facility] [place];  Service: Gastroenterology     ESOPHAGOSCOPY, GASTROSCOPY, DUODENOSCOPY (EGD), COMBINED N/A 11/6/2018    Procedure: ESOPHAGOGASTRODUODENOSCOPY;  Surgeon: Lit Fernando MD;  Location: [facility] [place];  Service:      HYSTERECTOMY       JOINT REPLACEMENT Left     TKA     RI ABLATE HEART DYSRHYTHM FOCUS      Description: Catheter Ablation Atrial Fibrillation;  Recorded: 07/31/2012;  Comments: 7/24/12 PVI with Dr. Gardiner and nilay to all 5 pulm veins and CTI fl ablation line as well.     ZZC SUPRACERV ABD HYSTERECTOMY      Description: Supracervical Hysterectomy;  Proc Date: 01/01/1985;  Comments: some cervix left!; ovaries intact; done for bleeding        Family History      Family History   Problem Relation Age of Onset     Heart Failure Mother      Cancer Other         paternal HX-laryngeal      Alcoholism Sister      No Known Problems Daughter      No Known Problems Maternal Grandmother      No Known Problems Maternal Grandfather      No Known Problems Paternal Grandmother      No Known Problems Paternal Grandfather      No Known Problems Maternal Aunt      No Known Problems Paternal Aunt      Alcoholism Sister      Alcoholism Brother      Alcoholism Father      Cancer Paternal Uncle         Gastric-Alcohol     Cancer Paternal Uncle        "  gastric-Alcohol     Hereditary Breast and Ovarian Cancer Syndrome No family hx of      Breast Cancer No family hx of      Colon Cancer No family hx of      Endometrial Cancer No family hx of      Ovarian Cancer No family hx of          Social History      .  Social History     Socioeconomic History     Marital status:      Spouse name: Not on file     Number of children: Not on file     Years of education: Not on file     Highest education level: Not on file   Occupational History     Not on file   Tobacco Use     Smoking status: Current Every Day Smoker     Packs/day: 0.50     Types: Cigarettes     Smokeless tobacco: Never Used   Substance and Sexual Activity     Alcohol use: Yes     Comment: Alcoholic Drinks/day: very little     Drug use: No     Sexual activity: Not on file   Other Topics Concern     Not on file   Social History Narrative     Not on file     Social Determinants of Health     Financial Resource Strain: Not on file   Food Insecurity: Not on file   Transportation Needs: Not on file   Physical Activity: Not on file   Stress: Not on file   Social Connections: Not on file   Intimate Partner Violence: Not on file   Housing Stability: Not on file          Allergies        Allergies   Allergen Reactions     Celebrex [Celecoxib] Rash     patient had butterfly rash - \"lupus-like\"       Latex Rash         Prior to Admission Medications      No current facility-administered medications on file prior to encounter.  albuterol (PROAIR HFA;PROVENTIL HFA;VENTOLIN HFA) 90 mcg/actuation inhaler, [ALBUTEROL (PROAIR HFA;PROVENTIL HFA;VENTOLIN HFA) 90 MCG/ACTUATION INHALER] INHALE 2 PUFFS EVERY 4 HOURS AS NEEDED WHEEZING  atorvastatin (LIPITOR) 20 MG tablet, TAKE 1 TABLET(20 MG) BY MOUTH AT BEDTIME  cyclobenzaprine (FLEXERIL) 10 MG tablet, Take 1 tablet (10 mg) by mouth nightly as needed for muscle spasms  diltiazem ER COATED BEADS (CARDIZEM CD/CARTIA XT) 120 MG 24 hr capsule, TAKE 1 CAPSULE(120 MG) BY MOUTH " "DAILY  furosemide (LASIX) 20 MG tablet, Take 1 tablet (20 mg) by mouth daily as needed  gabapentin (NEURONTIN) 600 MG tablet, Take 1 tablet (600 mg) by mouth 3 times daily  guaiFENesin-codeine (ROBITUSSIN AC) 100-10 MG/5ML solution, Take 5-10 mLs by mouth every 4 hours as needed for cough  meclizine (ANTIVERT) 25 MG tablet, Take 1 tablet (25 mg) by mouth 3 times daily as needed for dizziness or nausea  metFORMIN (GLUCOPHAGE) 500 MG tablet, TAKE 1 TABLET(500 MG) BY MOUTH TWICE DAILY WITH MEALS  naloxone (NARCAN) 4 MG/0.1ML nasal spray, Spray 1 spray (4 mg) into one nostril alternating nostrils once as needed for opioid reversal every 2-3 minutes until assistance arrives  nicotine (NICODERM CQ) 21 MG/24HR 24 hr patch, APPLY 1 PATCH EXTERNALLY TO THE SKIN DAILY  nystatin (NYSTOP) powder, [NYSTATIN (NYSTOP) POWDER] Apply 1 application topically 2 (two) times a day as needed. 2-3 times to affected area(s).  omeprazole (PRILOSEC) 20 MG DR capsule, TAKE 1 CAPSULE(20 MG) BY MOUTH DAILY BEFORE BREAKFAST  oxyCODONE IR (ROXICODONE) 10 MG tablet, Take 1 tablet (10 mg) by mouth every 6 hours as needed for moderate to severe pain  sucralfate (CARAFATE) 1 GM tablet, TAKE 1 TABLET BY MOUTH FOUR TIMES DAILY(CRUSH TABLET AND MIX IT WITH A LITTLE WATER, THEN SWALLOW)  SYMBICORT 160-4.5 MCG/ACT Inhaler, INHALE 2 PUFFS BY MOUTH TWICE DAILY  warfarin ANTICOAGULANT (COUMADIN) 5 MG tablet, TAKE 1/2 TO 1 TABLET BY MOUTH DAILY AS DIRECTED. ADJUST DOSE PER INR RESULTS (Patient taking differently: Take 2.5-5 mg by mouth See Admin Instructions 2.5mg Saturday; 5mg ROW)  ACCU-CHEK SOFTCLIX LANCETS lancets, [ACCU-CHEK SOFTCLIX LANCETS LANCETS] TEST THREE TIMES DAILY  BD ULTRA-FINE SHORT PEN NEEDLE 31 gauge x 5/16\" Ndle, [BD ULTRA-FINE SHORT PEN NEEDLE 31 GAUGE X 5/16\" NDLE] TEST FOUR TIMES DAILY WITH MEALS AND AT BEDTIME  blood-glucose meter (ONETOUCH VERIO IQ METER) Misc, [BLOOD-GLUCOSE METER (ONETOUCH VERIO IQ METER) MISC] Check blood sugar " three times a day.  diaper,brief,adult,disposable (ADULT BRIEFS - LARGE) Misc, [DIAPER,BRIEF,ADULT,DISPOSABLE (ADULT BRIEFS - LARGE) MISC] Use 3-4 daily as needed for incontinence  generic lancets (FINGERSTIX LANCETS), [GENERIC LANCETS (FINGERSTIX LANCETS)] Dispense brand per patient's insurance at pharmacy discretion.  ONETOUCH VERIO IQ test strip, USE 1 EACH AS DIRECTED THREE TIMES DAILY AS NEEDED            Review of Systems     A 12 point comprehensive review of systems was negative except as noted above in HPI.    PHYSICAL EXAMINATION       Vitals      Vitals: /58   Pulse 87   Resp 29   SpO2 96%   BMI= There is no height or weight on file to calculate BMI.      Examination     General Appearance:  Alert, cooperative, no distress  Head:    Normocephalic, without obvious abnormality, atraumatic  EENT:  PERRL, conjunctiva/corneas clear, EOM's intact.   Neck:   Supple, symmetrical, trachea midline, no adenopathy; no NVE  Back:  Symmetric, no curvature, no CVA tenderness  Chest/Lungs: decreased air entry, no rales, mild wheeze, respirations unlabored, No tenderness or deformity. No abdominal breathing or use of accessory muscles.   Heart:    Regular rate and rhythm, S1 and S2 normal, 2/6 SM at LPSB   Abdomen: Soft, non-tender, bowel sounds active all four quadrants, not peritoneal on palpation. Not distended  Extremities:  Extremities normal, atraumatic, no swelling   Skin:  Skin color, texture, turgor normal, no rashes or lesion  Neurologic:  Awake and alert, No lateralizing or localizing signs               Pertinent Lab     Results for orders placed or performed during the hospital encounter of 03/26/22   XR Chest Port 1 View    Impression    IMPRESSION: Hyperinflation. Some mild increased opacity right lung base could represent a mild pneumonia. More prominent than prior study. Some chronic atelectasis right midlung.   CBC (+ platelets, no diff)   Result Value Ref Range    WBC Count 8.9 4.0 - 11.0  10e3/uL    RBC Count 4.10 3.80 - 5.20 10e6/uL    Hemoglobin 9.9 (L) 11.7 - 15.7 g/dL    Hematocrit 33.3 (L) 35.0 - 47.0 %    MCV 81 78 - 100 fL    MCH 24.1 (L) 26.5 - 33.0 pg    MCHC 29.7 (L) 31.5 - 36.5 g/dL    RDW 17.5 (H) 10.0 - 15.0 %    Platelet Count 199 150 - 450 10e3/uL   Basic metabolic panel   Result Value Ref Range    Sodium 139 136 - 145 mmol/L    Potassium 4.5 3.5 - 5.0 mmol/L    Chloride 101 98 - 107 mmol/L    Carbon Dioxide (CO2) 27 22 - 31 mmol/L    Anion Gap 11 5 - 18 mmol/L    Urea Nitrogen 16 8 - 28 mg/dL    Creatinine 0.69 0.60 - 1.10 mg/dL    Calcium 9.7 8.5 - 10.5 mg/dL    Glucose 140 (H) 70 - 125 mg/dL    GFR Estimate >90 >60 mL/min/1.73m2   Result Value Ref Range    INR 3.00 (H) 0.85 - 1.15   Result Value Ref Range    Troponin I 0.01 0.00 - 0.29 ng/mL   Symptomatic; Unknown Influenza A/B & SARS-CoV2 (COVID-19) Virus PCR Multiplex Nasopharyngeal    Specimen: Nasopharyngeal; Swab   Result Value Ref Range    Influenza A PCR Negative Negative    Influenza B PCR Negative Negative    SARS CoV2 PCR Negative Negative           Pertinent Radiology

## 2022-03-27 NOTE — PLAN OF CARE
Problem: Respiratory Compromise COPD (Chronic Obstructive Pulmonary Disease)  Goal: Effective Oxygenation and Ventilation  Outcome: Ongoing, Progressing     Goal: Optimal Comfort and Wellbeing  Outcome: Ongoing, Progressing     Pt came from ED at 2230. Pt on 10L oxymask sating 90's. Started de-sating to low 80's when sleeping, switched to BiPAP overnight at 50% O2. Pt seems to be comfortable and denies pain.

## 2022-03-27 NOTE — ED NOTES
Pt SOB  Placed on Bipap 12/6 16 50% 3x nebs given. Bilateral BS diminished. Sats 92-97%patient resting comfortably.

## 2022-03-27 NOTE — CONSULTS
Care Management Initial Consult      General Information  Assessment completed with: Patient, Family, Pt and son's s/o Cynthia.  Type of CM/SW Visit: Initial Assessment    Primary Care Provider verified and updated as needed: Yes   Readmission within the last 30 days: no previous admission in last 30 days   Reason for Consult: discharge planning, health care directive, transportation  Advance Care Planning: Advance Care Planning Reviewed: questions answered        Communication Assessment  Patient's communication style: spoken language (English or Bilingual)    Hearing Difficulty or Deaf: no   Wear Glasses or Blind: no    Cognitive  Cognitive/Neuro/Behavioral: WDL                      Living Environment:   People in home: child(david), adult     Current living Arrangements: house      Able to return to prior arrangements: yes     Family/Social Support:  Care provided by: self, child(david) (Adult children in home assist as needed.)  Provides care for: no one, unable/limited ability to care for self  Marital Status:   Children          Description of Support System: Supportive, Involved    Support Assessment: Adequate social supports, Adequate family and caregiver support    Current Resources:   Patient receiving home care services: No  Community Resources:  (None disclosed. Prev charting states PCA/home care service.)  Equipment currently used at home: cane, straight, walker, rolling, grab bar, tub/shower, shower chair  Supplies currently used at home: Oxygen Tubing/Supplies    Employment/Financial:  Employment Status: retired     Financial Concerns: No concerns identified   Referral to Financial Worker: No     Lifestyle & Psychosocial Needs:  Social Determinants of Health     Tobacco Use: High Risk     Smoking Tobacco Use: Current Every Day Smoker     Smokeless Tobacco Use: Never Used   Alcohol Use: Not on file   Financial Resource Strain: Not on file   Food Insecurity: Not on file   Transportation Needs: Not on  "file   Physical Activity: Not on file   Stress: Not on file   Social Connections: Not on file   Intimate Partner Violence: Not on file   Depression: Not at risk     PHQ-2 Score: 1   Housing Stability: Not on file       Functional Status:  Prior to admission patient needed assistance:   Dependent ADLs:: Ambulation-walker (As needed.)  Dependent IADLs:: Cleaning, Cooking, Laundry, Shopping, Meal Preparation, Transportation  Assesssment of Functional Status: Not at  functional baseline    Mental Health Status:  Mental Health Status: No Current Concerns       Chemical Dependency Status:  Chemical Dependency Status: No Current Concerns           Values/Beliefs:  Spiritual, Cultural Beliefs, Methodist Practices, Values that affect care: no             Additional Information:  Writer met with pt and her son's s/o Cynthia to introduce Care Management service(CM) and obtain an initial assessment. Pt, son, and Cynthia share a house. Pt is provided for as needed by son and Cynthia. No in-home services currently. Pt and Cynthia deny the need for potential CM involvement.    3/26 per , S. -\"72 year old female who presents for evaluation of dyspnea and cough. Records reviewed. Patient has a history of COPD, uses 4L of O2 at baseline when at home. She reports recently being treated with 5 days of prednisone and antibiotics, initially felt better, but had worsening symptoms within the last 2 days. She describes dyspnea, cough productive with green sputum. She tried using her home breathing treatments without relief. This evening, patient called the nursing care line to see if she could get a prescription for steroids and antibiotics and also reported having sats of 60% on RA. She put on her supplementary oxygen but was advised to come in to the ED for evaluation. Vitals on arrival notable for hypoxia and mild tachycardia. No fever. BP stable. Remainder of history and exam, as below. I considered COPD exacerbation, viral upper or " "lower respiratory infection, pneumonia, pulmonary edema/CHF, PE, anemia. No trauma or known blebs to suggest pneumothorax. No CP or history to suggest atypical presentation of MI. Patient is on coumadin so while I cannot PERC out, do feel she is low risk for PE assuming therapeutic level. Discussed options for workup and management with patient. We agreed on plan for labs, ECG, CXR, and management of symptoms with IV steroids, neb x3, and antibiotics to treat for CAP vs COPD exacerbation. I anticipate patient will require admission, which she is agreeable to. During my initial evaluation, patient was switched from O2 per NC to facemask at 10L. She remained hypoxic with sats <90% so RT was called to place her on BiPAP. CBC with hemoglobin of 9.9, stable. No leukocytosis. BMP unremarkable without electrolyte derangement, acidosis, or acute kidney injury. BNP within normal limits. Troponin negative and again, EKG without ischemic changes. INR at upper end of therapeutic range at 3.0. With this, I do have low suspicion for PE. Covid-19 and influenza both negative. Chest x-ray revealed evidence of possible right lower lobe infiltrate. Patient has already received antibiotics to cover for COPD exacerbation and community-acquired pneumonia so plan to continue these for now.\"    No current consults pending. Anticipate continued improvement of symptoms and a return home via family without issue or services. CM to follow and assist with discharge service coordination if deemed necessary.     Deric Munguia RN        "

## 2022-03-27 NOTE — PROGRESS NOTES
Duo nebs given as ordered. Pt on 4L canula. Baseline O2 is 5-6L at night.  Lungs dim and pt has productive cough. RT to follow.     Ernestine Delgado, RT

## 2022-03-27 NOTE — ED NOTES
Pt requesting to go off BIPAP.  Per Dr. Meek okay to trial patient off as long as oxygen stays above 90-92%.  Placed on oxymask 10 L and 94% currently.

## 2022-03-27 NOTE — PLAN OF CARE
Goal Outcome Evaluation:      Problem: Infection COPD (Chronic Obstructive Pulmonary Disease)  Goal: Absence of Infection Signs and Symptoms  Outcome: Ongoing, Progressing     Problem: Respiratory Compromise COPD (Chronic Obstructive Pulmonary Disease)  Goal: Effective Oxygenation and Ventilation  Outcome: Ongoing, Progressing  Intervention: Promote Airway Secretion Clearance  Recent Flowsheet Documentation  Taken 3/27/2022 1200 by Keiry Fox RN  Cough And Deep Breathing: done independently per patient  Taken 3/27/2022 0900 by Keiry Fox RN  Cough And Deep Breathing: done independently per patient     Patient is alert and oriented. Pt's oxygen needs improving. Pt is currently on 4L nasal cannula and O2 saturations are 90-93%. Pt has a productive cough. Pt states her breathing feels more comfortable. Pt is NSR with a BBB and PAC's. Will continue to monitor.       Keiry Fox RN

## 2022-03-27 NOTE — ED PROVIDER NOTES
EMERGENCY DEPARTMENT ENCOUNTER      NAME: Mary Kay Tejada  YOB: 1950  MRN: [id number]      FINAL IMPRESSION  1. Acute on chronic respiratory failure with hypoxia (H)    2. COPD exacerbation (H)    3. Pneumonia of right lower lobe due to infectious organism    4. Dyspnea, unspecified type        MEDICAL DECISION MAKING   Pertinent Labs & Imaging studies reviewed. (See chart for details)    Mary Kay Tejada is a 72 year old female who presents for evaluation of dyspnea and cough. Records reviewed. Patient has a history of COPD, uses 4L of O2 at baseline when at home. She reports recently being treated with 5 days of prednisone and antibiotics, initially felt better, but had worsening symptoms within the last 2 days. She describes dyspnea, cough productive with green sputum. She tried using her home breathing treatments without relief. This evening, patient called the nursing care line to see if she could get a prescription for steroids and antibiotics and also reported having sats of 60% on RA. She put on her supplementary oxygen but was advised to come in to the ED for evaluation. Vitals on arrival notable for hypoxia and mild tachycardia. No fever. BP stable. Remainder of history and exam, as below.     I considered COPD exacerbation, viral upper or lower respiratory infection, pneumonia, pulmonary edema/CHF, PE, anemia. No trauma or known blebs to suggest pneumothorax. No CP or history to suggest atypical presentation of MI. Patient is on coumadin so while I cannot PERC out, do feel she is low risk for PE assuming therapeutic level. Discussed options for workup and management with patient. We agreed on plan for labs, ECG, CXR, and management of symptoms with IV steroids, neb x3, and antibiotics to treat for CAP vs COPD exacerbation. I anticipate patient will require admission, which she is agreeable to.     During my initial evaluation, patient was switched from O2 per NC to facemask at 10L. She  remained hypoxic with sats <90% so RT was called to place her on BiPAP.     CBC with hemoglobin of 9.9, stable.  No leukocytosis.  BMP unremarkable without electrolyte derangement, acidosis, or acute kidney injury.  BNP within normal limits.  Troponin negative and again, EKG without ischemic changes.  INR at upper end of therapeutic range at 3.0.  With this, I do have low suspicion for PE.  Covid-19 and influenza both negative.  Chest x-ray revealed evidence of possible right lower lobe infiltrate.  Patient has already received antibiotics to cover for COPD exacerbation and community-acquired pneumonia so plan to continue these for now.    I rechecked the patient multiple times.  She had significant improvement in respiratory status on BiPAP and was actually able to fall asleep for a bit.  When she awoke, she requested to trial off BiPAP but was agreeable to staying in the hospital.  Just prior to this occurring, discussed the case with Dr. Boucher of hospital medicine team who agreed to facilitate admission to cardiac telemetry bed.  Patient was transferred to the floor in stable condition.  No acute events under my care.    Critical care: 45 minutes excluding separately billable procedures.  Includes bedside management, time reviewing test results, review of records, discussing the case with staff, documenting the medical record and time spent with family members (or surrogate decision makers) discussing specific treatment issues.        ED COURSE  7:29 PM I met with the patient, obtained history, performed an initial exam, and discussed options and plan for diagnostics and treatment here in the ED.   7:36 PM Called RT.   8:05 PM Patient now on BIPAP and sating at 95%. Patient looks much better.   8:45 PM Spoke with the hospitalist, Dr. Weber. Would like me to admit patient to the night hospitalist who is on at 9:00 PM.     9:07 PM Informed by nurse that patient would like to come off the BIPAP.   9:15 PM  Spoke with the hospitalist, Dr. Boucher.        MEDICATIONS GIVEN IN THE ED  Medications   atorvastatin (LIPITOR) tablet 20 mg (20 mg Oral Given 3/26/22 2310)   cyclobenzaprine (FLEXERIL) tablet 10 mg (has no administration in time range)   gabapentin (NEURONTIN) tablet 600 mg (600 mg Oral Given 3/26/22 2350)   guaiFENesin-codeine (ROBITUSSIN AC) 100-10 MG/5ML solution 5-10 mL (has no administration in time range)   oxyCODONE (ROXICODONE) tablet 10 mg (has no administration in time range)   sucralfate (CARAFATE) tablet 1 g (1 g Oral Given 3/26/22 2310)   lidocaine 1 % 0.1-1 mL (has no administration in time range)   lidocaine (LMX4) cream (has no administration in time range)   sodium chloride (PF) 0.9% PF flush 3 mL (3 mLs Intracatheter Given 3/26/22 2310)   sodium chloride (PF) 0.9% PF flush 3 mL (has no administration in time range)   melatonin tablet 1 mg (has no administration in time range)   Patient is already receiving anticoagulation with heparin, enoxaparin (LOVENOX), warfarin (COUMADIN)  or other anticoagulant medication (has no administration in time range)   acetaminophen (TYLENOL) tablet 650 mg (has no administration in time range)     Or   acetaminophen (TYLENOL) Suppository 650 mg (has no administration in time range)   methylPREDNISolone sodium succinate (solu-MEDROL) injection 62.5 mg (has no administration in time range)   diltiazem ER COATED BEADS (CARDIZEM CD/CARTIA XT) 24 hr capsule 120 mg (has no administration in time range)   ipratropium - albuterol 0.5 mg/2.5 mg/3 mL (DUONEB) neb solution 3 mL (has no administration in time range)   azithromycin 500 mg (ZITHROMAX) in 0.9% NaCl 250 mL intermittent infusion 500 mg (has no administration in time range)   cefTRIAXone (ROCEPHIN) 1 g vial to attach to  mL bag for ADULTS or NS 50 mL bag for PEDS (has no administration in time range)   glucose gel 15-30 g (has no administration in time range)     Or   dextrose 50 % injection 25-50 mL (has  no administration in time range)     Or   glucagon injection 1 mg (has no administration in time range)   insulin aspart (NovoLOG) injection (RAPID ACTING) (has no administration in time range)   insulin aspart (NovoLOG) injection (RAPID ACTING) (1 Units Subcutaneous Not Given 3/26/22 2318)   hydrOXYzine (ATARAX) tablet 25 mg (has no administration in time range)   pantoprazole (PROTONIX) EC tablet 40 mg (has no administration in time range)   morphine (PF) injection 2 mg (has no administration in time range)   fluticasone-vilanterol (BREO ELLIPTA) 200-25 MCG/INH inhaler 1 puff (has no administration in time range)   warfarin-No DOSE today (1 each Does not apply Not Given 3/26/22 2317)   methylPREDNISolone sodium succinate (solu-MEDROL) injection 125 mg (125 mg Intravenous Given 3/1950)   ipratropium - albuterol 0.5 mg/2.5 mg/3 mL (DUONEB) neb solution 3 mL (3 mLs Nebulization Given 3/26/22 2026)   azithromycin 500 mg (ZITHROMAX) in 0.9% NaCl 250 mL intermittent infusion 500 mg (500 mg Intravenous New Bag 3/26/22 2121)   cefTRIAXone (ROCEPHIN) 1 g vial to attach to  mL bag for ADULTS or NS 50 mL bag for PEDS (0 g Intravenous Stopped 3/26/22 2121)       NEW PRESCRIPTIONS STARTED AT TODAY'S VISIT  Current Discharge Medication List             =================================================================    Chief Complaint   Patient presents with     Shortness of Breath         HPI:    Patient information was obtained from: patient     Use of : N/A    Mary Kay Tejada is a 72 year old female who presents for shortness of breath.     Per chart review, on 3/14/2022 (12 days ago), patient was prescribed prednisone, antibiotic, and cough suppressant for acute bronchitis. Patient called the the nurse triage line and reported she was feeling better but began coughing green sputum. Endorses shortness of breath but denies fever or chest pain. She reported an oxygen saturation of 80% on room air. She  usually uses 4L of O2. After applying O2 as advised, patient's saturation increased to 89%. She was advised by nurse to present to the ED at this time.     Per patient, she finished her steroids and antibiotic on 3/22/2022 (4 days ago) and was feeling better. Yesterday, patient started to feel worse with shortness of breath and cough. Reports that when cough is productive, the sputum is green. She also endorses chills. Patient has a nebulizer and inhalers at home which she has tried without relief. Patient called her clinic today for a prescription for more steroids and antibiotic but was told to present to the ED.     Patient has a pertinent medical history of GERD, anemia, fibromyalgia, atrial fibrillation, atrial flutter, hyperlipidemia, hypertension, diabetes mellitus, and COPD. Endorses that she is used to the feeling of her COPD, so she cannot say if her current symptoms feel similar. She is vaccinated and boosted against COVID-19. Denies recent travel or sick contacts. She took a home COVID-19 test today which was negative.     Patient denies fever, chest pain, abdominal pain, nausea, vomiting, diarrhea, constipation, urinary symptoms, leg swelling, or any other complaints at this time.      RELEVANT HISTORY, MEDICATIONS, & ALLERGIES   Past medical history, surgical history, family history, medications, and allergies reviewed and pertinent noted in HPI. See end of note for comprehensive list.    REVIEW OF SYSTEMS:  Review of Systems   Constitutional: Positive for chills. Negative for fever.   Respiratory: Positive for cough (productive of green sputum) and shortness of breath.    Cardiovascular: Negative for chest pain and leg swelling.   Gastrointestinal: Negative for abdominal pain, constipation, diarrhea, nausea and vomiting.   Genitourinary: Negative for decreased urine volume, dysuria, frequency and hematuria.   All other systems reviewed and are negative.      PHYSICAL EXAM:    Vitals: /60 (BP  Location: Right arm)   Pulse 79   Temp 98.8  F (37.1  C) (Oral)   Resp 16   SpO2 90%    General: Appears in mild respiratory distress. Alert and interactive.  HENT: Oropharynx without erythema or exudates. MMM.   Eyes: Pupils mid-sized and equally reactive.   Neck: Full AROM.   Cardiovascular: Tachycardic, regular. Peripheral pulses 2+ bilaterally.  Chest/Pulmonary: Increased work of breathing with tachypnea. Coarse breath sounds with wheezing at bases.   Abdomen: Soft, nondistended. Nontender without guarding or rebound.  Back/Spine: No CVA or midline tenderness.  Extremities: Normal ROM of all major joints. No lower extremity edema.   Skin: Warm and dry. Normal skin color.   Neuro: Speech clear. CNs grossly intact. Moves all extremities appropriately. Strength and sensation grossly intact to all extremities.   Psych: Normal affect/mood, cooperative, memory appropriate.     LAB  Labs Ordered and Resulted from Time of ED Arrival to Time of ED Departure   CBC WITH PLATELETS - Abnormal       Result Value    WBC Count 8.9      RBC Count 4.10      Hemoglobin 9.9 (*)     Hematocrit 33.3 (*)     MCV 81      MCH 24.1 (*)     MCHC 29.7 (*)     RDW 17.5 (*)     Platelet Count 199     BASIC METABOLIC PANEL - Abnormal    Sodium 139      Potassium 4.5      Chloride 101      Carbon Dioxide (CO2) 27      Anion Gap 11      Urea Nitrogen 16      Creatinine 0.69      Calcium 9.7      Glucose 140 (*)     GFR Estimate >90     INR - Abnormal    INR 3.00 (*)    MAGNESIUM - Abnormal    Magnesium 1.4 (*)    TROPONIN I - Normal    Troponin I 0.01     INFLUENZA A/B & SARS-COV2 PCR MULTIPLEX - Normal    Influenza A PCR Negative      Influenza B PCR Negative      SARS CoV2 PCR Negative     B-TYPE NATRIURETIC PEPTIDE ([facility] ONLY) - Normal    BNP 67         RADIOLOGY  CT Chest w/o Contrast   Final Result   IMPRESSION:    1.  Subtle infiltrate in the right upper lobe and both lower lobes, likely representing an infectious or an  inflammatory process. Diffuse thickening of the bronchi. No cavitation, suspicious adenopathy or pleural effusion on either side. Mild    emphysematous changes.      2.  Normal cardiac size. Dense coronary artery and aortic valvular calcifications. No pericardial effusion.      3.  Left adrenal hypodense benign adenoma, stable.      4.  Demineralization of the visualized bones. Degenerative changes both shoulders and the spine. Right convex thoracic curve. Distal left rotator cuff calcific arthropathy.         XR Chest Port 1 View   Final Result   IMPRESSION: Hyperinflation. Some mild increased opacity right lung base could represent a mild pneumonia. More prominent than prior study. Some chronic atelectasis right midlung.      Echocardiogram Complete    (Results Pending)       EKG  Performed at: 19:31  Impression: Sinus tachycardia. LAFB. No acute ischemic changes. Compared to previous, rate has increased, otherwise no significant change.   Rate: 107 bpm  Rhythm: Sinus  QRS Interval: 106 ms  QTc Interval: 451 ms  Comparison: 6/3/2019    All laboratory and imaging results and EKG's were personally reviewed and interpreted by myself prior to disposition decision.     Comprehensive outline of EPIC chart Hx  PAST MEDICAL HISTORY    Past Medical History:   Diagnosis Date     Anemia      Aortic stenosis      Atrial fibrillation (H)      Atrial flutter (H)      Benign neoplasm of adenomatous polyp     large intestine      Chronic constipation      Chronic pain syndrome      COPD (chronic obstructive pulmonary disease) (H)     Oxygen at night      Dependence on supplemental oxygen     Oxygen at noc, during the day as needed     Depression      Diabetes mellitus (H)      Dry eye syndrome      Fibromyalgia      Ganglion     left wrist     GERD (gastroesophageal reflux disease)      Hyperlipidemia      Hypertension      Hypokalemia      Infective otitis externa, unspecified     Created by Conversion      Larynx edema       Lung disease      Malignant neoplasm of vulva (H)     Created by [facility] Annotation: Apr 17 2007  8:24AM - Cammy Bui:  resection per Dr. Alfonso Mane 9/06;  Replacement Utility updated for latest IMO load     Medial epicondylitis      Onychomycosis      Osteoarthritis      Peptic ulcer      Polyneuropathy      Vulvar malignant neoplasm (H)      Past Surgical History:   Procedure Laterality Date     BIOPSY BREAST Right      BIOPSY BREAST Right 01/28/2015     BIOPSY BREAST Right 1/28/2015    Procedure: RIGHT BREAST BIOPSY AFTER WIRE LOCALIZATION AT 0940;  Surgeon: Renée Soriano MD;  Location: [facility] [place];  Service:      BIOPSY OF BREAST, INCISIONAL      Description: Incisional Breast Biopsy;  Recorded: 11/13/2007;  Comments: benign     COLONOSCOPY N/A 6/14/2019    Procedure: COLONOSCOPY;  Surgeon: Eduardo Mora MD;  Location: [facility] [place];  Service: Gastroenterology     ESOPHAGOSCOPY, GASTROSCOPY, DUODENOSCOPY (EGD), COMBINED N/A 11/6/2018    Procedure: ESOPHAGOGASTRODUODENOSCOPY;  Surgeon: Lit Fernando MD;  Location: [facility] [place];  Service:      HYSTERECTOMY       JOINT REPLACEMENT Left     TKA     UT ABLATE HEART DYSRHYTHM FOCUS      Description: Catheter Ablation Atrial Fibrillation;  Recorded: 07/31/2012;  Comments: 7/24/12 PVI with Dr. Gardiner and nilay to all 5 pulm veins and CTI fl ablation line as well.     ZZC SUPRACERV ABD HYSTERECTOMY      Description: Supracervical Hysterectomy;  Proc Date: 01/01/1985;  Comments: some cervix left!; ovaries intact; done for bleeding       CURRENT MEDICATIONS    Current Outpatient Medications   Medication Instructions     ACCU-CHEK SOFTCLIX LANCETS lancets [ACCU-CHEK SOFTCLIX LANCETS LANCETS] TEST THREE TIMES DAILY     albuterol (PROAIR HFA;PROVENTIL HFA;VENTOLIN HFA) 90 mcg/actuation inhaler [ALBUTEROL (PROAIR HFA;PROVENTIL HFA;VENTOLIN HFA) 90 MCG/ACTUATION INHALER] INHALE 2 PUFFS EVERY 4 HOURS AS NEEDED WHEEZING  "    atorvastatin (LIPITOR) 20 MG tablet TAKE 1 TABLET(20 MG) BY MOUTH AT BEDTIME     BD ULTRA-FINE SHORT PEN NEEDLE 31 gauge x 5/16\" Ndle [BD ULTRA-FINE SHORT PEN NEEDLE 31 GAUGE X 5/16\" NDLE] TEST FOUR TIMES DAILY WITH MEALS AND AT BEDTIME     blood-glucose meter (ONETOUCH VERIO IQ METER) Misc [BLOOD-GLUCOSE METER (ONETOUCH VERIO IQ METER) MISC] Check blood sugar three times a day.     cyclobenzaprine (FLEXERIL) 10 mg, Oral, AT BEDTIME PRN     diaper,brief,adult,disposable (ADULT BRIEFS - LARGE) Misc [DIAPER,BRIEF,ADULT,DISPOSABLE (ADULT BRIEFS - LARGE) MISC] Use 3-4 daily as needed for incontinence     diltiazem ER COATED BEADS (CARDIZEM CD/CARTIA XT) 120 MG 24 hr capsule TAKE 1 CAPSULE(120 MG) BY MOUTH DAILY     furosemide (LASIX) 20 mg, Oral, DAILY PRN     gabapentin (NEURONTIN) 600 mg, Oral, 3 TIMES DAILY     generic lancets (FINGERSTIX LANCETS) [GENERIC LANCETS (FINGERSTIX LANCETS)] Dispense brand per patient's insurance at pharmacy discretion.     guaiFENesin-codeine (ROBITUSSIN AC) 100-10 MG/5ML solution 5-10 mLs, Oral, EVERY 4 HOURS PRN     meclizine (ANTIVERT) 25 mg, Oral, 3 TIMES DAILY PRN     metFORMIN (GLUCOPHAGE) 500 MG tablet TAKE 1 TABLET(500 MG) BY MOUTH TWICE DAILY WITH MEALS     mometasone-formoterol (DULERA) 200-5 mcg/actuation HFAA inhaler 2 puffs, Inhalation, 2 TIMES DAILY     naloxone (NARCAN) 4 mg, Alternating Nostrils, ONCE PRN, every 2-3 minutes until assistance arrives     nicotine (NICODERM CQ) 21 MG/24HR 24 hr patch APPLY 1 PATCH EXTERNALLY TO THE SKIN DAILY     nystatin (NYSTOP) powder 1 Application, Topical, 2 TIMES DAILY PRN     NYSTOP 182156 UNIT/GM powder APPLY TOPICALLY TO THE AFFECTED AREA TWICE DAILY- THREE TIMES DAILY AS NEEDED     omeprazole (PRILOSEC) 20 MG DR capsule TAKE 1 CAPSULE(20 MG) BY MOUTH DAILY BEFORE BREAKFAST     ONETOUCH VERIO IQ test strip USE 1 EACH AS DIRECTED THREE TIMES DAILY AS NEEDED     oxyCODONE IR (ROXICODONE) 10 mg, Oral, EVERY 6 HOURS PRN     " "sucralfate (CARAFATE) 1 GM tablet TAKE 1 TABLET BY MOUTH FOUR TIMES DAILY(CRUSH TABLET AND MIX IT WITH A LITTLE WATER, THEN SWALLOW)     SYMBICORT 160-4.5 MCG/ACT Inhaler INHALE 2 PUFFS BY MOUTH TWICE DAILY     warfarin ANTICOAGULANT (COUMADIN) 5 MG tablet TAKE 1/2 TO 1 TABLET BY MOUTH DAILY AS DIRECTED. ADJUST DOSE PER INR RESULTS       ALLERGIES    Allergies   Allergen Reactions     Celebrex [Celecoxib] Rash     patient had butterfly rash - \"lupus-like\"       Latex Rash       FAMILY HISTORY    Family History   Problem Relation Age of Onset     Heart Failure Mother      Cancer Other         paternal HX-laryngeal      Alcoholism Sister      No Known Problems Daughter      No Known Problems Maternal Grandmother      No Known Problems Maternal Grandfather      No Known Problems Paternal Grandmother      No Known Problems Paternal Grandfather      No Known Problems Maternal Aunt      No Known Problems Paternal Aunt      Alcoholism Sister      Alcoholism Brother      Alcoholism Father      Cancer Paternal Uncle         Gastric-Alcohol     Cancer Paternal Uncle         gastric-Alcohol     Hereditary Breast and Ovarian Cancer Syndrome No family hx of      Breast Cancer No family hx of      Colon Cancer No family hx of      Endometrial Cancer No family hx of      Ovarian Cancer No family hx of        SOCIAL HISTORY    Social History     Socioeconomic History     Marital status:      Spouse name: None     Number of children: None     Years of education: None     Highest education level: None   Occupational History     None   Tobacco Use     Smoking status: Current Every Day Smoker     Packs/day: 0.50     Types: Cigarettes     Smokeless tobacco: Never Used   Substance and Sexual Activity     Alcohol use: Yes     Comment: Alcoholic Drinks/day: very little     Drug use: No     Sexual activity: None   Other Topics Concern     None   Social History Narrative     None     Autumn BARBER, am serving as a scribe to " document services personally performed by Dr. Talia Meek based on my observation and the provider's statements to me. I, Talia Meek MD attest that Autumn Benz is acting in a scribe capacity, has observed my performance of the services and has documented them in accordance with my direction.    Talia Meek M.D.  Emergency Medicine  [facility] EMERGENCY ROOM  [address], [place] [id number] [phone]  Dept: [phone]     Talia Meek MD  03/27/22 0121

## 2022-03-27 NOTE — PHARMACY-ANTICOAGULATION SERVICE
Clinical Pharmacy - Warfarin Dosing Consult     Pharmacy has been consulted to manage this patient s warfarin therapy.  Indication: Atrial Fibrillation  Therapy Goal: INR 2-3  Provider/Team: AARON VILA Anticoag Clinic: [facility]  Warfarin Prior to Admission: Yes  Warfarin PTA Regimen: 2.5 mg Sat, 5 mg ROW  Significant drug interactions: Azithromycin, methylprednisolone  Recent documented change in oral intake/nutrition: Unknown  Dose Comments: 3/22 INR 1.8, dose increased - now 3/26 INR 3 (will hold tonight, also with new start abx)    INR   Date Value Ref Range Status   03/26/2022 3.00 (H) 0.85 - 1.15 Final   03/22/2022 1.8 (H) 0.9 - 1.1 Final       Recommend warfarin 0 mg today.  Pharmacy will monitor Mary Kay S Terrell daily and order warfarin doses to achieve specified goal.      Please contact pharmacy as soon as possible if the warfarin needs to be held for a procedure or if the warfarin goals change.

## 2022-03-28 LAB
GLUCOSE BLDC GLUCOMTR-MCNC: 127 MG/DL (ref 70–99)
GLUCOSE BLDC GLUCOMTR-MCNC: 168 MG/DL (ref 70–99)
GLUCOSE BLDC GLUCOMTR-MCNC: 174 MG/DL (ref 70–99)
GLUCOSE BLDC GLUCOMTR-MCNC: 210 MG/DL (ref 70–99)
INR PPP: 3.07 (ref 0.85–1.15)
MAGNESIUM SERPL-MCNC: 2.2 MG/DL (ref 1.8–2.6)
POTASSIUM BLD-SCNC: 4.1 MMOL/L (ref 3.5–5)

## 2022-03-28 PROCEDURE — 210N000002 HC R&B HEART CARE

## 2022-03-28 PROCEDURE — 99232 SBSQ HOSP IP/OBS MODERATE 35: CPT | Performed by: HOSPITALIST

## 2022-03-28 PROCEDURE — 999N000157 HC STATISTIC RCP TIME EA 10 MIN

## 2022-03-28 PROCEDURE — 250N000013 HC RX MED GY IP 250 OP 250 PS 637: Performed by: INTERNAL MEDICINE

## 2022-03-28 PROCEDURE — 83735 ASSAY OF MAGNESIUM: CPT | Performed by: INTERNAL MEDICINE

## 2022-03-28 PROCEDURE — 94640 AIRWAY INHALATION TREATMENT: CPT | Mod: 76

## 2022-03-28 PROCEDURE — 250N000011 HC RX IP 250 OP 636: Performed by: INTERNAL MEDICINE

## 2022-03-28 PROCEDURE — 258N000003 HC RX IP 258 OP 636: Performed by: INTERNAL MEDICINE

## 2022-03-28 PROCEDURE — 250N000009 HC RX 250: Performed by: INTERNAL MEDICINE

## 2022-03-28 PROCEDURE — 36415 COLL VENOUS BLD VENIPUNCTURE: CPT | Performed by: HOSPITALIST

## 2022-03-28 PROCEDURE — 84132 ASSAY OF SERUM POTASSIUM: CPT | Performed by: INTERNAL MEDICINE

## 2022-03-28 PROCEDURE — 94640 AIRWAY INHALATION TREATMENT: CPT

## 2022-03-28 PROCEDURE — 250N000012 HC RX MED GY IP 250 OP 636 PS 637: Performed by: INTERNAL MEDICINE

## 2022-03-28 PROCEDURE — 85610 PROTHROMBIN TIME: CPT | Performed by: HOSPITALIST

## 2022-03-28 PROCEDURE — 272N000202 HC AEROBIKA WITH MANOMETER

## 2022-03-28 PROCEDURE — 250N000013 HC RX MED GY IP 250 OP 250 PS 637: Performed by: HOSPITALIST

## 2022-03-28 PROCEDURE — 94799 UNLISTED PULMONARY SVC/PX: CPT

## 2022-03-28 RX ORDER — BUDESONIDE AND FORMOTEROL FUMARATE DIHYDRATE 160; 4.5 UG/1; UG/1
AEROSOL RESPIRATORY (INHALATION)
Qty: 10.2 G | Refills: 0 | Status: SHIPPED | OUTPATIENT
Start: 2022-03-28 | End: 2022-03-29

## 2022-03-28 RX ADMIN — DILTIAZEM HYDROCHLORIDE 120 MG: 120 CAPSULE, COATED, EXTENDED RELEASE ORAL at 08:09

## 2022-03-28 RX ADMIN — GABAPENTIN 600 MG: 600 TABLET, FILM COATED ORAL at 14:43

## 2022-03-28 RX ADMIN — IPRATROPIUM BROMIDE AND ALBUTEROL SULFATE 3 ML: 2.5; .5 SOLUTION RESPIRATORY (INHALATION) at 19:56

## 2022-03-28 RX ADMIN — GUAIFENESIN 15 ML: 100 SOLUTION ORAL at 16:18

## 2022-03-28 RX ADMIN — CEFTRIAXONE 1 G: 1 INJECTION, POWDER, FOR SOLUTION INTRAMUSCULAR; INTRAVENOUS at 21:44

## 2022-03-28 RX ADMIN — GUAIFENESIN 15 ML: 100 SOLUTION ORAL at 21:40

## 2022-03-28 RX ADMIN — IPRATROPIUM BROMIDE AND ALBUTEROL SULFATE 3 ML: 2.5; .5 SOLUTION RESPIRATORY (INHALATION) at 16:25

## 2022-03-28 RX ADMIN — IPRATROPIUM BROMIDE AND ALBUTEROL SULFATE 3 ML: 2.5; .5 SOLUTION RESPIRATORY (INHALATION) at 08:25

## 2022-03-28 RX ADMIN — SUCRALFATE 1 G: 1 TABLET ORAL at 21:41

## 2022-03-28 RX ADMIN — GABAPENTIN 600 MG: 600 TABLET, FILM COATED ORAL at 21:41

## 2022-03-28 RX ADMIN — AZITHROMYCIN MONOHYDRATE 500 MG: 500 INJECTION, POWDER, LYOPHILIZED, FOR SOLUTION INTRAVENOUS at 21:44

## 2022-03-28 RX ADMIN — GUAIFENESIN 5 ML: 100 SOLUTION ORAL at 11:05

## 2022-03-28 RX ADMIN — SUCRALFATE 1 G: 1 TABLET ORAL at 16:15

## 2022-03-28 RX ADMIN — METHYLPREDNISOLONE SODIUM SUCCINATE 62.5 MG: 125 INJECTION, POWDER, FOR SOLUTION INTRAMUSCULAR; INTRAVENOUS at 21:41

## 2022-03-28 RX ADMIN — GABAPENTIN 600 MG: 600 TABLET, FILM COATED ORAL at 08:09

## 2022-03-28 RX ADMIN — PANTOPRAZOLE SODIUM 40 MG: 20 TABLET, DELAYED RELEASE ORAL at 06:04

## 2022-03-28 RX ADMIN — ATORVASTATIN CALCIUM 20 MG: 10 TABLET, FILM COATED ORAL at 21:41

## 2022-03-28 RX ADMIN — SUCRALFATE 1 G: 1 TABLET ORAL at 06:04

## 2022-03-28 RX ADMIN — METHYLPREDNISOLONE SODIUM SUCCINATE 62.5 MG: 125 INJECTION, POWDER, FOR SOLUTION INTRAMUSCULAR; INTRAVENOUS at 08:09

## 2022-03-28 RX ADMIN — SUCRALFATE 1 G: 1 TABLET ORAL at 12:41

## 2022-03-28 RX ADMIN — IPRATROPIUM BROMIDE AND ALBUTEROL SULFATE 3 ML: 2.5; .5 SOLUTION RESPIRATORY (INHALATION) at 12:29

## 2022-03-28 RX ADMIN — INSULIN ASPART 1 UNITS: 100 INJECTION, SOLUTION INTRAVENOUS; SUBCUTANEOUS at 21:42

## 2022-03-28 ASSESSMENT — ACTIVITIES OF DAILY LIVING (ADL)
ADLS_ACUITY_SCORE: 7

## 2022-03-28 NOTE — PROGRESS NOTES
[facility] MEDICINE PROGRESS NOTE      Identification/Summary: Mary Kay Tejada is a 72 year old female with a past medical history of COPD, HTN, HLD, afib on warfarin, moderate aortic stenosis, history of HFpEF who was admitted on 3/26/2022 for acute on chronic hypoxic respiratory failure and acute hypercapnic respiratory failure from COPD acute exacerbation requiring BiPAP. Hospital course is notable for utilizing BiPAP. Compensated pH 7.41, pCO2 noted at 51 with normal pH c/w compensated chronic retainer. Appears she was admitted with treatment for possible bacterial PNA as well; continue ceftriaxone and azithromycin.     Now off of BiPAP. Strep p antigen is positive; thus, continue antibiotic coverage. Discussed with CM/SW and would recommend PT/OT evals if patient does not decline, and continue another day of IV steroid treatment. Order increased guaifenesin for expectorant as she finds clearing her throat helpful. If remains at 4 L with exertion/ambulation and further improved, then possible discharge tomorrow.     Assessment and Plan:  Acute COPD Exacerbation  Acute on Chronic Hypoxic Respiratory Failure requiring BiPAP (baseline is 4 L)  Acute Hypercapnic/Hypercarbic Respiratory Failure, requiring BiPAP resolved/improving (pH 7.41 now on blood gas)  -Utilize IV steroids and then transition to PO once clinically improved. One more day of IV steroids before weaning or transitioning to prednisone.   -Duonebs ordered scheduled initially and then transition/wean as per RT as per protocol pending clinical improvement.  -Utilize azithromycin for anti-inflammatory adjunctive treatment and transition to PO once clinically improved.   -Additional nebulizer as needed.   -Oxygen O2 supplementation to maintain O2 >88%. O2 < 94%. Goal 89-93%. Wean O2 as tolerated.  -Appreciate RT support and care.  -If coughing symptoms are severe, would utilize benzonatate tessalon pearls, dextromethorphan as  "needed for suppressing cough symptoms; consider additional as needed and judicious use of codeine and/or guaifenesin as appropriate.   -Order increased guaifenesin for expectorant as she finds clearing her throat helpful    CAP NA  -Continue ceftriaxone and azithromycin.  -Strep p antigen is positive; thus, continue antibiotic coverage.  -Would plan for total 7-day course.     HTN  -Order home medications and as needed apply specified hold parameters.     HLD  -Order home statin.    Atrial Fibrillation   -Optimize potassium and magnesium. Keep K > 4.0, Mag > 2.0 ideally.   -Order home medications including AV-christiane rate control medication.  -Recommend resuming home anticoagulation with warfarin/DOAC.  -If any acute sustained rapid rates develop, obtain EKG to evaluate for RVR and initiate cardiac telemetry and update [facility] team.   -Stable and can discontinue telemetry if rates well-controlled for 24-hrs.     Clinically Significant Risk Factors Present on Admission                # Overweight: Estimated body mass index is 26.18 kg/m  as calculated from the following:    Height as of this encounter: 1.651 m (5' 5\").    Weight as of this encounter: 71.4 kg (157 lb 4.8 oz).      Diet: Moderate Consistent Carb (60 g CHO per Meal) Diet  DVT Prophylaxis:  Warfarin  Code Status: Full Code    Anticipated possible discharge hopefully tomorrow once more clinically improved, PT/OT evals, disposition planned, and ambulation with O2 monitoring, ideally 4L baseline even with exertion/ambulation milestones are met. Discussed with CM/SW and would recommend PT/OT evals if patient does not decline.     Disposition Plan   Expected Discharge:    Anticipated discharge location: home with family    Delays:          The patient's care was discussed with the patient, patient's family, RN/charge RN.    Quintin Mendoza MD  [facility] Medicine  [facility]  Phone: #[phone]    Interval " History/Subjective:  No acute events overnight.    Coughing frequently with pronounced spells. Productive cough. Strep p antigen is positive and this was discussed with her at great length. Discussed that she is already on appropriate treatment. She finds clearing her throat helpful; offered cough expectorant..No chest pain, no shortness of breath. No other new complaints.  She is coughing frequently during this encounter. Discussed plan of care with patient. Answered all questions to patient's verbalized and stated understanding and satisfaction.      Physical Exam/Objective:  Temp:  [97.5  F (36.4  C)-98.2  F (36.8  C)] 97.5  F (36.4  C)  Pulse:  [67-77] 71  Resp:  [16-18] 18  BP: (112-129)/(54-60) 127/59  FiO2 (%):  [50 %] 50 %  SpO2:  [89 %-100 %] 94 %  Body mass index is 26.18 kg/m .    GENERAL:  Alert, appears comfortable, in no acute distress, appears stated age, now on NC     HEAD:  Normocephalic, without obvious abnormality, atraumatic   EYES:  PERRL, conjunctiva/corneas clear, no scleral icterus, EOM's intact   NOSE: Nares normal, septum midline, mucosa normal, no drainage   THROAT: Lips, mucosa, and tongue normal; teeth and gums normal, mouth moist   NECK: Supple, symmetrical, trachea midline   BACK:   Symmetric, no curvature, ROM normal   LUNGS:   Significant diffuse inspiratory and expiratory wheezing, symmetric chest rise on inhalation, respirations unlabored   CHEST WALL:  No tenderness or deformity   HEART:  Regular rate and rhythm, S1 and S2 normal, no murmur, rub, or gallop    ABDOMEN:   Soft, non-tender, bowel sounds active all four quadrants, no masses, no organomegaly, no rebound or guarding   EXTREMITIES: Extremities normal, atraumatic, no cyanosis or edema    SKIN: Dry to touch, no exanthems in the visualized areas   NEURO: Alert, oriented x3, moves all four extremities freely   PSYCH: Cooperative, behavior is appropriate      Data reviewed today: I personally reviewed all new medications,  labs, imaging/diagnostics reports over the past 24 hours. Pertinent findings include:    Imaging:   Recent Results (from the past 24 hour(s))   Echocardiogram Complete   Result Value    LVEF  60-65%    Narrative    [id number]  [id number]  [id number] [address]     Name: ALVAREZ HINDS  MRN: [id number]  : 1950  Study Date: 2022 08:46 AM  Age: 72 yrs  Gender: Female  Patient Location: [facility]  Reason For Study: Aortic Valve Disorder  Ordering Physician: CAYDEN SILVA  Performed By: JUANA     BSA: 1.8 m2  Height: 65 in  Weight: 150 lb  HR: 77  ______________________________________________________________________________  Procedure  Complete Echo Adult. Definity (NDC #73386-080) given intravenously. Poor  acoustic windows.  ______________________________________________________________________________  Interpretation Summary     Poor acoustic windows.     1. Left ventricular function is normal.The ejection fraction is 60-65%. The  left ventricle is normal in size. The left ventricle is not well visualized.  2. Normal right ventricle size and systolic function.  3. There is mild mitral stenosis. There is severe mitral annular  calcification.  4. Moderate valvular aortic stenosis (ADALBERTO: 1.1 cm2, Peak.3 m/sec, DI:0.3, mean  gradient: 18.1 mmHg).  5. Right ventricle systolic pressure estimate normal     ______________________________________________________________________________  Left Ventricle  The left ventricle is normal in size. The left ventricle is not well  visualized. Left ventricular function is normal.The ejection fraction is 60-  65%. There is normal left ventricular wall thickness. Diastolic function not  assessed due to significant mitral annular calcification. Normal left  ventricular wall motion.     Right Ventricle  The right ventricle is not well visualized. Normal right ventricle size and  systolic function.      Atria  The left atrium is moderately dilated. The right atrium is moderately dilated.     Mitral Valve  There is severe mitral annular calcification. There is no mitral regurgitation  noted. There is mild mitral stenosis.     Tricuspid Valve  The tricuspid valve is not well visualized. Right ventricle systolic pressure  estimate normal. There is no tricuspid stenosis.     Aortic Valve  The aortic valve is not well visualized. There is trace aortic regurgitation.  Moderate valvular aortic stenosis.     Pulmonic Valve  The pulmonic valve is not well visualized.     Vessels  The aorta root is normal. Moderate atherosclerotic plaque(s) in the ascending  aorta. IVC diameter <2.1 cm collapsing >50% with sniff suggests a normal RA  pressure of 3 mmHg.     Pericardium  There is no pericardial effusion.     ______________________________________________________________________________  MMode/2D Measurements & Calculations  LVOT diam: 2.2 cm  LVOT area: 3.7 cm2     Doppler Measurements & Calculations  MV E max raji: 140.0 cm/sec  MV A max raji: 84.5 cm/sec  MV E/A: 1.7  MV max P.0 mmHg  MV mean P.9 mmHg  MV V2 VTI: 38.2 cm  MVA(VTI): 2.0 cm2  MV dec slope: 816.0 cm/sec2  MV dec time: 0.17 sec  Ao V2 max: 301.3 cm/sec  Ao max P.0 mmHg  Ao V2 mean: 195.7 cm/sec  Ao mean P.1 mmHg  Ao V2 VTI: 66.8 cm  ADALBERTO(I,D): 1.1 cm2  ADALBERTO(V,D): 1.1 cm2  LV V1 max PG: 3.2 mmHg  LV V1 max: 88.8 cm/sec  LV V1 VTI: 20.7 cm  SV(LVOT): 76.0 ml  SI(LVOT): 43.4 ml/m2  AV Raji Ratio (DI): 0.29  ADALBERTO Index (cm2/m2): 0.65  E/E' av.6  Lateral E/e': 14.6  Medial E/e': 18.7     ______________________________________________________________________________  Report approved by: Diamante Dykes 2022 02:58 PM             Labs:  Most Recent 3 CBC's:  Recent Labs   Lab Test 22  0527 226 21  0756   WBC 9.1 8.9 7.2   HGB 9.1* 9.9* 11.0*   MCV 82 81 83    199 254     Most Recent 3 BMP's:  Recent Labs    Lab Test 03/28/22 0752 03/28/22  0423 03/27/22 2058 03/27/22  1608 03/27/22  0813 03/27/22 0527 03/26/22 2250 03/26/22 1946 12/24/21 0756   NA  --   --   --   --   --  140  --  139 144   POTASSIUM  --  4.1  --   --   --  4.2  --  4.5 3.9   CHLORIDE  --   --   --   --   --  104  --  101 102   CO2  --   --   --   --   --  27 --  27 28   BUN  --   --   --   --   --  16  --  16 13   CR  --   --   --   --   --  0.65  --  0.69 0.64   ANIONGAP  --   --   --   --   --  9  --  11 14   JOEY  --   --   --   --   --  9.3  --  9.7 9.6   *  --  148* 219*   < > 192*   < > 140* 70    < > = values in this interval not displayed.     Most Recent 2 LFT's:  Recent Labs   Lab Test 03/27/22 0527 03/26/22 1946   AST 18 22   ALT 14 15   ALKPHOS 84 93   BILITOTAL 0.2 0.3     Anticoagulation Dose History     Recent Dosing and Labs Latest Ref Rng & Units 11/22/2021 12/24/2021 1/21/2022 2/21/2022 3/22/2022 3/26/2022 3/27/2022    INR 0.85 - 1.15 2.2(H) 3.1(H) 2.1(H) 1.8(H) 1.8(H) 3.00(H) 3.47(H)            Medications:   Personally Reviewed.  Medications     - MEDICATION INSTRUCTIONS -       Warfarin Therapy Reminder         atorvastatin  20 mg Oral At Bedtime     azithromycin  500 mg Intravenous Q24H     cefTRIAXone  1 g Intravenous Q24H     diltiazem ER COATED BEADS  120 mg Oral Daily     fluticasone-vilanterol  1 puff Inhalation Daily     gabapentin  600 mg Oral TID     guaiFENesin  15 mL Oral Q4H     insulin aspart  1-7 Units Subcutaneous TID AC     insulin aspart  1-5 Units Subcutaneous At Bedtime     ipratropium - albuterol 0.5 mg/2.5 mg/3 mL  3 mL Nebulization 4x daily     methylPREDNISolone  62.5 mg Intravenous Q12H     pantoprazole  40 mg Oral QAM AC     sodium chloride (PF)  3 mL Intracatheter Q8H     sucralfate  1 g Oral 4x Daily AC & HS

## 2022-03-28 NOTE — PLAN OF CARE
Goal: Optimal Comfort and Wellbeing  Outcome: Ongoing, Progressing    Problem: Respiratory Compromise COPD (Chronic Obstructive Pulmonary Disease)  Goal: Effective Oxygenation and Ventilation  Outcome: Ongoing, Progressing     Pt is on baseline 4L NC, sating well. Pt is up with standby assist, de-sats a little but recovers quickly. Pt denies pain and is resting comfortably.

## 2022-03-28 NOTE — TELEPHONE ENCOUNTER
"Routing refill request to provider for review/approval because:  Alternate medication request needs review.     Last Written Prescription Date:  2/14/2022  Last Fill Quantity: 10.2,  # refills: 0   Last office visit provider:  3/22/2022       Requested Prescriptions   Pending Prescriptions Disp Refills     budesonide-formoterol (SYMBICORT) 160-4.5 MCG/ACT Inhaler [Pharmacy Med Name: BUDESONIDE/FORM 160/4.5MCG(120 INH)] 10.2 g 0     Sig: INHALE 2 PUFFS BY MOUTH TWICE DAILY       Inhaled Steroids Protocol Passed - 3/28/2022  2:57 PM        Passed - Patient is age 12 or older        Passed - Recent (12 mo) or future (30 days) visit within the authorizing provider's specialty     Patient has had an office visit with the authorizing provider or a provider within the authorizing providers department within the previous 12 mos or has a future within next 30 days. See \"Patient Info\" tab in inbasket, or \"Choose Columns\" in Meds & Orders section of the refill encounter.              Passed - Medication is active on med list       Long-Acting Beta Agonist Inhalers Protocol  Passed - 3/28/2022  2:57 PM        Passed - Patient is age 12 or older        Passed - Recent (12 mo) or future (30 days) visit within the authorizing provider's specialty     Patient has had an office visit with the authorizing provider or a provider within the authorizing providers department within the previous 12 mos or has a future within next 30 days. See \"Patient Info\" tab in inbasket, or \"Choose Columns\" in Meds & Orders section of the refill encounter.              Passed - Medication is active on med list             Tami Turner RN 03/28/22 3:03 PM  "

## 2022-03-28 NOTE — PROGRESS NOTES
"RESPIRATORY CARE NOTE     Patient Name: Mary Kay Tejada  Today's Date: 3/28/2022       Pt continues to receive Duo nebs QID. Pt currently on 4 lpm via nasal cannula, BS diminished with scattered expiratory wheezes heard post treatment.     Bipap on standby.     /56 (BP Location: Right arm)   Pulse 71   Temp 97.7  F (36.5  C) (Oral)   Resp 16   Ht 1.651 m (5' 5\")   Wt 68.3 kg (150 lb 9.6 oz)   SpO2 91%   BMI 25.06 kg/m      Renetta Santiago, RT      "

## 2022-03-28 NOTE — PROGRESS NOTES
Pt given Duoneb as ordered.  Instructed pt on use of AEROBIKA.  Pt is on 4 LPM NC, o2 sats low 90's, RR 20-22, BS diminished, exp wheeze, occas cs.  Will continue to monitor pt.

## 2022-03-28 NOTE — PLAN OF CARE
"Patient wanting to walk and see how she was progressing. Patient on her baseline 4L NC O2. She reports walking without O2 at home.    Walked 30 feet with RN on room air and pulse ox monitoring. Initial O2 was 88-92%. On the way back to her room the patient reported feeling lightheaded and O2 sat went as low as 84%.    Patient independent in room with long nasal cannula tubing. Walker given to patient per request.    Yolis Conte RN on 3/28/2022 at 6:21 PM      Problem: Plan of Care - These are the overarching goals to be used throughout the patient stay.    Goal: Plan of Care Review/Shift Note  Description: The Plan of Care Review/Shift note should be completed every shift.  The Outcome Evaluation is a brief statement about your assessment that the patient is improving, declining, or no change.  This information will be displayed automatically on your shift note.  Outcome: Ongoing, Progressing  Goal: Patient-Specific Goal (Individualized)  Description: You can add care plan individualizations to a care plan. Examples of Individualization might be:  \"Parent requests to be called daily at 9am for status\", \"I have a hard time hearing out of my right ear\", or \"Do not touch me to wake me up as it startles me\".  Outcome: Ongoing, Progressing  Goal: Absence of Hospital-Acquired Illness or Injury  Outcome: Ongoing, Progressing  Intervention: Identify and Manage Fall Risk  Recent Flowsheet Documentation  Taken 3/28/2022 1200 by Dg, Yolis, RN  Safety Promotion/Fall Prevention: activity supervised  Taken 3/28/2022 0820 by Yolis Conte RN  Safety Promotion/Fall Prevention: activity supervised  Intervention: Prevent Skin Injury  Recent Flowsheet Documentation  Taken 3/28/2022 1226 by Yolis Conte RN  Body Position: position changed independently  Taken 3/28/2022 1200 by Yolis Conte RN  Body Position: position changed independently  Taken 3/28/2022 0820 by Yolis Conte RN  Body " Position: position changed independently  Intervention: Prevent and Manage VTE (Venous Thromboembolism) Risk  Recent Flowsheet Documentation  Taken 3/28/2022 1200 by Yolis Conte RN  Activity Management: activity adjusted per tolerance  Taken 3/28/2022 0820 by Yolis Conte RN  Activity Management: activity adjusted per tolerance  Goal: Optimal Comfort and Wellbeing  Outcome: Ongoing, Progressing  Goal: Readiness for Transition of Care  Outcome: Ongoing, Progressing     Problem: Risk for Delirium  Goal: Optimal Coping  Outcome: Ongoing, Progressing  Goal: Improved Behavioral Control  Outcome: Ongoing, Progressing  Goal: Improved Attention and Thought Clarity  Outcome: Ongoing, Progressing  Goal: Improved Sleep  Outcome: Ongoing, Progressing     Problem: Adjustment to Illness COPD (Chronic Obstructive Pulmonary Disease)  Goal: Optimal Chronic Illness Coping  Outcome: Ongoing, Progressing     Problem: Functional Ability Impaired COPD (Chronic Obstructive Pulmonary Disease)  Goal: Optimal Level of Functional [place]  Outcome: Ongoing, Progressing  Intervention: Optimize Functional Ability  Recent Flowsheet Documentation  Taken 3/28/2022 1200 by Yolis Conte RN  Activity Management: activity adjusted per tolerance  Taken 3/28/2022 0820 by Yolis Conte RN  Activity Management: activity adjusted per tolerance     Problem: Infection COPD (Chronic Obstructive Pulmonary Disease)  Goal: Absence of Infection Signs and Symptoms  Outcome: Ongoing, Progressing     Problem: Oral Intake Inadequate COPD (Chronic Obstructive Pulmonary Disease)  Goal: Improved Nutrition Intake  Outcome: Ongoing, Progressing     Problem: Respiratory Compromise COPD (Chronic Obstructive Pulmonary Disease)  Goal: Effective Oxygenation and Ventilation  Outcome: Ongoing, Progressing  Intervention: Promote Airway Secretion Clearance  Recent Flowsheet Documentation  Taken 3/28/2022 1200 by Yolis Conte RN  Cough  And Deep Breathing: done independently per patient  Activity Management: activity adjusted per tolerance  Taken 3/28/2022 0820 by Yolis Conte RN  Cough And Deep Breathing: done independently per patient  Activity Management: activity adjusted per tolerance  Intervention: Optimize Oxygenation and Ventilation  Recent Flowsheet Documentation  Taken 3/28/2022 1200 by Yolis Conte, RN  Head of Bed (HOB) Positioning: HOB at 20-30 degrees  Taken 3/28/2022 0820 by Yolis Conte, RN  Head of Bed (HOB) Positioning: HOB at 20-30 degrees   Goal Outcome Evaluation:

## 2022-03-29 ENCOUNTER — TELEPHONE (OUTPATIENT)
Dept: FAMILY MEDICINE | Facility: CLINIC | Age: 72
End: 2022-03-29
Payer: COMMERCIAL

## 2022-03-29 ENCOUNTER — PATIENT OUTREACH (OUTPATIENT)
Dept: GERIATRIC MEDICINE | Facility: CLINIC | Age: 72
End: 2022-03-29

## 2022-03-29 DIAGNOSIS — J43.9 PULMONARY EMPHYSEMA, UNSPECIFIED EMPHYSEMA TYPE (H): Primary | ICD-10-CM

## 2022-03-29 LAB
BACTERIA SPT CULT: NORMAL
GLUCOSE BLDC GLUCOMTR-MCNC: 153 MG/DL (ref 70–99)
GLUCOSE BLDC GLUCOMTR-MCNC: 155 MG/DL (ref 70–99)
GLUCOSE BLDC GLUCOMTR-MCNC: 180 MG/DL (ref 70–99)
GLUCOSE BLDC GLUCOMTR-MCNC: 200 MG/DL (ref 70–99)
GRAM STAIN RESULT: NORMAL
INR PPP: 2.2 (ref 0.85–1.15)
MAGNESIUM SERPL-MCNC: 1.8 MG/DL (ref 1.8–2.6)
POTASSIUM BLD-SCNC: 4 MMOL/L (ref 3.5–5)

## 2022-03-29 PROCEDURE — 85610 PROTHROMBIN TIME: CPT | Performed by: HOSPITALIST

## 2022-03-29 PROCEDURE — 83735 ASSAY OF MAGNESIUM: CPT | Performed by: INTERNAL MEDICINE

## 2022-03-29 PROCEDURE — 250N000013 HC RX MED GY IP 250 OP 250 PS 637: Performed by: HOSPITALIST

## 2022-03-29 PROCEDURE — 94640 AIRWAY INHALATION TREATMENT: CPT | Mod: 76

## 2022-03-29 PROCEDURE — 94640 AIRWAY INHALATION TREATMENT: CPT

## 2022-03-29 PROCEDURE — 210N000002 HC R&B HEART CARE

## 2022-03-29 PROCEDURE — 999N000033 HC STATISTIC CHRONIC PULMONARY DISEASE SPECIALIST

## 2022-03-29 PROCEDURE — 94799 UNLISTED PULMONARY SVC/PX: CPT

## 2022-03-29 PROCEDURE — 99232 SBSQ HOSP IP/OBS MODERATE 35: CPT | Performed by: INTERNAL MEDICINE

## 2022-03-29 PROCEDURE — 999N000157 HC STATISTIC RCP TIME EA 10 MIN

## 2022-03-29 PROCEDURE — 250N000011 HC RX IP 250 OP 636: Performed by: INTERNAL MEDICINE

## 2022-03-29 PROCEDURE — 36415 COLL VENOUS BLD VENIPUNCTURE: CPT | Performed by: INTERNAL MEDICINE

## 2022-03-29 PROCEDURE — G0463 HOSPITAL OUTPT CLINIC VISIT: HCPCS

## 2022-03-29 PROCEDURE — 250N000009 HC RX 250: Performed by: INTERNAL MEDICINE

## 2022-03-29 PROCEDURE — 250N000013 HC RX MED GY IP 250 OP 250 PS 637: Performed by: INTERNAL MEDICINE

## 2022-03-29 PROCEDURE — 84132 ASSAY OF SERUM POTASSIUM: CPT | Performed by: INTERNAL MEDICINE

## 2022-03-29 PROCEDURE — 250N000012 HC RX MED GY IP 250 OP 636 PS 637: Performed by: INTERNAL MEDICINE

## 2022-03-29 PROCEDURE — 258N000003 HC RX IP 258 OP 636: Performed by: INTERNAL MEDICINE

## 2022-03-29 RX ORDER — FUROSEMIDE 20 MG
20 TABLET ORAL DAILY
Status: DISCONTINUED | OUTPATIENT
Start: 2022-03-29 | End: 2022-03-31 | Stop reason: HOSPADM

## 2022-03-29 RX ORDER — BUDESONIDE AND FORMOTEROL FUMARATE DIHYDRATE 160; 4.5 UG/1; UG/1
2 AEROSOL RESPIRATORY (INHALATION) 2 TIMES DAILY
Qty: 10.2 G | Refills: 3 | Status: SHIPPED | OUTPATIENT
Start: 2022-03-29 | End: 2023-01-10

## 2022-03-29 RX ORDER — CEFDINIR 300 MG/1
CAPSULE ORAL
Qty: 20 CAPSULE | Refills: 0 | OUTPATIENT
Start: 2022-03-29

## 2022-03-29 RX ORDER — AMOXICILLIN 250 MG
1 CAPSULE ORAL DAILY
Status: DISCONTINUED | OUTPATIENT
Start: 2022-03-29 | End: 2022-03-31 | Stop reason: HOSPADM

## 2022-03-29 RX ORDER — PREDNISONE 20 MG/1
40 TABLET ORAL DAILY
Status: DISCONTINUED | OUTPATIENT
Start: 2022-03-29 | End: 2022-03-31 | Stop reason: HOSPADM

## 2022-03-29 RX ORDER — WARFARIN SODIUM 5 MG/1
5 TABLET ORAL
Status: COMPLETED | OUTPATIENT
Start: 2022-03-29 | End: 2022-03-29

## 2022-03-29 RX ADMIN — SUCRALFATE 1 G: 1 TABLET ORAL at 08:40

## 2022-03-29 RX ADMIN — IPRATROPIUM BROMIDE AND ALBUTEROL SULFATE 3 ML: 2.5; .5 SOLUTION RESPIRATORY (INHALATION) at 19:55

## 2022-03-29 RX ADMIN — GUAIFENESIN 15 ML: 100 SOLUTION ORAL at 21:05

## 2022-03-29 RX ADMIN — AZITHROMYCIN MONOHYDRATE 500 MG: 500 INJECTION, POWDER, LYOPHILIZED, FOR SOLUTION INTRAVENOUS at 21:50

## 2022-03-29 RX ADMIN — WARFARIN SODIUM 5 MG: 5 TABLET ORAL at 17:36

## 2022-03-29 RX ADMIN — CEFTRIAXONE 1 G: 1 INJECTION, POWDER, FOR SOLUTION INTRAMUSCULAR; INTRAVENOUS at 21:08

## 2022-03-29 RX ADMIN — GABAPENTIN 600 MG: 600 TABLET, FILM COATED ORAL at 08:40

## 2022-03-29 RX ADMIN — GABAPENTIN 600 MG: 600 TABLET, FILM COATED ORAL at 13:27

## 2022-03-29 RX ADMIN — SUCRALFATE 1 G: 1 TABLET ORAL at 13:26

## 2022-03-29 RX ADMIN — GABAPENTIN 600 MG: 600 TABLET, FILM COATED ORAL at 21:07

## 2022-03-29 RX ADMIN — METHYLPREDNISOLONE SODIUM SUCCINATE 62.5 MG: 125 INJECTION, POWDER, FOR SOLUTION INTRAMUSCULAR; INTRAVENOUS at 08:39

## 2022-03-29 RX ADMIN — GUAIFENESIN 15 ML: 100 SOLUTION ORAL at 13:26

## 2022-03-29 RX ADMIN — PANTOPRAZOLE SODIUM 40 MG: 20 TABLET, DELAYED RELEASE ORAL at 08:39

## 2022-03-29 RX ADMIN — IPRATROPIUM BROMIDE AND ALBUTEROL SULFATE 3 ML: 2.5; .5 SOLUTION RESPIRATORY (INHALATION) at 11:29

## 2022-03-29 RX ADMIN — FUROSEMIDE 20 MG: 20 TABLET ORAL at 13:27

## 2022-03-29 RX ADMIN — GUAIFENESIN 15 ML: 100 SOLUTION ORAL at 00:42

## 2022-03-29 RX ADMIN — PREDNISONE 40 MG: 20 TABLET ORAL at 17:36

## 2022-03-29 RX ADMIN — FLUTICASONE FUROATE AND VILANTEROL TRIFENATATE 1 PUFF: 200; 25 POWDER RESPIRATORY (INHALATION) at 08:40

## 2022-03-29 RX ADMIN — DILTIAZEM HYDROCHLORIDE 120 MG: 120 CAPSULE, COATED, EXTENDED RELEASE ORAL at 08:40

## 2022-03-29 RX ADMIN — SUCRALFATE 1 G: 1 TABLET ORAL at 17:37

## 2022-03-29 RX ADMIN — IPRATROPIUM BROMIDE AND ALBUTEROL SULFATE 3 ML: 2.5; .5 SOLUTION RESPIRATORY (INHALATION) at 08:02

## 2022-03-29 RX ADMIN — ATORVASTATIN CALCIUM 20 MG: 10 TABLET, FILM COATED ORAL at 21:07

## 2022-03-29 RX ADMIN — SUCRALFATE 1 G: 1 TABLET ORAL at 21:07

## 2022-03-29 RX ADMIN — SENNOSIDES AND DOCUSATE SODIUM 1 TABLET: 50; 8.6 TABLET ORAL at 13:26

## 2022-03-29 RX ADMIN — GUAIFENESIN 15 ML: 100 SOLUTION ORAL at 17:37

## 2022-03-29 RX ADMIN — IPRATROPIUM BROMIDE AND ALBUTEROL SULFATE 3 ML: 2.5; .5 SOLUTION RESPIRATORY (INHALATION) at 15:54

## 2022-03-29 RX ADMIN — GUAIFENESIN 15 ML: 100 SOLUTION ORAL at 08:40

## 2022-03-29 ASSESSMENT — ACTIVITIES OF DAILY LIVING (ADL)
ADLS_ACUITY_SCORE: 5

## 2022-03-29 NOTE — PROGRESS NOTES
"[facility] Medicine PROGRESS NOTE      Identification/Summary:   Mary Kay Tejada is a 72 year old female with a past medical history of COPD, HTN, HLD, afib on warfarin, moderate aortic stenosis, history of HFpEF who was admitted on 3/26/2022 for acute on chronic hypoxic respiratory failure and acute hypercapnic respiratory failure from COPD acute exacerbation requiring BiPAP, pneumonia. Hospital course is notable for utilizing BiPAP.  She is feeling improved still continues to have a persistent congested cough, she will discharge tomorrow.     Assessment and Plan:  Acute COPD Exacerbation  Acute on Chronic Hypoxic Respiratory Failure requiring BiPAP (baseline is 4 L)  Acute Hypercapnic/Hypercarbic Respiratory Failure, requiring BiPAP resolved/improving (pH 7.41 now on blood gas)  Continue steroids changed to prednisone  Continue nebs  Continue ceftriaxone, azithromycin  At baseline oxygen 4 L  will restart her home Lasix today.     CAP NA  -Continue ceftriaxone and azithromycin.  -Strep p antigen is positive; thus, continue antibiotic coverage.  -Would plan for total 7-day course.      HTN  -Order home medications and as needed apply specified hold parameters.      HLD  -Order home statin.     Atrial Fibrillation   Continue Cardizem CD, warfarin    Diet: Moderate Consistent Carb (60 g CHO per Meal) Diet  DVT Prophylaxis: On warfarin  Code Status: Full Code    Anticipated possible discharge in 1 days once clinical improvement milestones are met.    Interval History/Subjective:  She is feeling better.  Still has shortness of breath with walking.  She is coughing up green phlegm.  No chest pain.  No other urinary Complaints.  Requesting  For her home Lasix, stool medications.    Physical Exam/Objective:  Vitals I/O   Vital signs:  Temp: 98.2  F (36.8  C) Temp src: Oral BP: 128/58 Pulse: 65   Resp: 20 SpO2: 92 % O2 Device: Nasal cannula Oxygen Delivery: 4 LPM Height: 165.1 cm (5' 5\") Weight: 70.4 kg (155 lb " "3.2 oz)  Estimated body mass index is 25.83 kg/m  as calculated from the following:    Height as of this encounter: 1.651 m (5' 5\").    Weight as of this encounter: 70.4 kg (155 lb 3.2 oz). I/O last 3 completed shifts:  In: 1680 [P.O.:1320; I.V.:360]  Out: 375 [Urine:375]     Body mass index is 25.83 kg/m .    General Appearance:  Alert, cooperative, no distress   Head:  Normocephalic, atraumatic   Eyes:  PERRL    Throat:  mucosa; moist   Neck: No JVD, thyromegaly   Lungs:    Rhonchi bilaterally, respirations unlabored   Chest Wall:  No tenderness or deformity   Heart:  Regular rate and rhythm, S1, S2 normal,no murmur   Abdomen:   Soft, non tender, non distended, bowel sounds present, no guarding or rigidity   Extremities: + traceedema, no joint swelling   Skin: Skin color, texture, turgor normal, no rashes or lesions   Neurologic: Alert and oriented X 3, Moves all 4 extremities       Medications:   Personally Reviewed.    atorvastatin  20 mg Oral At Bedtime     azithromycin  500 mg Intravenous Q24H     cefTRIAXone  1 g Intravenous Q24H     diltiazem ER COATED BEADS  120 mg Oral Daily     fluticasone-vilanterol  1 puff Inhalation Daily     furosemide  20 mg Oral Daily     gabapentin  600 mg Oral TID     guaiFENesin  15 mL Oral Q4H     insulin aspart  1-7 Units Subcutaneous TID AC     insulin aspart  1-5 Units Subcutaneous At Bedtime     ipratropium - albuterol 0.5 mg/2.5 mg/3 mL  3 mL Nebulization 4x daily     methylPREDNISolone  62.5 mg Intravenous Q12H     pantoprazole  40 mg Oral QAM AC     senna-docusate  1 tablet Oral Daily     sodium chloride (PF)  3 mL Intracatheter Q8H     sucralfate  1 g Oral 4x Daily AC & HS     warfarin ANTICOAGULANT  5 mg Oral ONCE at 18:00       Data reviewed today: I personally reviewed all new medications, labs, imaging/diagnostics reports over the past 24 hours. Pertinent findings include    Labs:  Most Recent 3 CBC's:Recent Labs   Lab Test 03/27/22  0527 03/26/22  1946 " 12/24/21 0756   WBC 9.1 8.9 7.2   HGB 9.1* 9.9* 11.0*   MCV 82 81 83    199 254     Most Recent 3 BMP's:Recent Labs   Lab Test 03/29/22  1259 03/29/22  0753 03/29/22  0439 03/28/22  2130 03/28/22  0752 03/28/22  0423 03/27/22  0813 03/27/22  0527 03/26/22 2250 03/26/22 1946 12/24/21 0756   NA  --   --   --   --   --   --   --  140  --  139 144   POTASSIUM  --   --  4.0  --   --  4.1  --  4.2  --  4.5 3.9   CHLORIDE  --   --   --   --   --   --   --  104  --  101 102   CO2  --   --   --   --   --   --   --  27  --  27 28   BUN  --   --   --   --   --   --   --  16  --  16 13   CR  --   --   --   --   --   --   --  0.65  --  0.69 0.64   ANIONGAP  --   --   --   --   --   --   --  9  --  11 14   JOEY  --   --   --   --   --   --   --  9.3  --  9.7 9.6   * 155*  --  210*   < >  --    < > 192*   < > 140* 70    < > = values in this interval not displayed.     Most Recent 2 LFT's:Recent Labs   Lab Test 03/27/22 0527 03/26/22 1946   AST 18 22   ALT 14 15   ALKPHOS 84 93   BILITOTAL 0.2 0.3     Most Recent 3 INR's:Recent Labs   Lab Test 03/29/22  0439 03/28/22  0423 03/27/22  0838   INR 2.20* 3.07* 3.47*       Imaging:   No results found for this or any previous visit (from the past 24 hour(s)).    Jillian Agrawal MD  Hospitalist  [facility]

## 2022-03-29 NOTE — TELEPHONE ENCOUNTER
Drug not covered by patient plan. The preferred alternative is Symbicort, AdvairDiskus, AdvairHFA, BreoEllipta, Dulera. Please call/fax the pharmacy to change medication along with strength, directions, quantity and refills.

## 2022-03-29 NOTE — PROGRESS NOTES
"RESPIRATORY CARE NOTE     Patient Name: Mary Kay Tejada  Today's Date: 3/29/2022       Pt continues to receive Duo nebs QID with aerobika. Pt currently on 4 lpm via nasal cannula, BS coarse/diminished with scattered expiratory wheezes heard. Pt has strong productive cough.     /64 (BP Location: Right arm)   Pulse 94   Temp 97.5  F (36.4  C) (Oral)   Resp 21   Ht 1.651 m (5' 5\")   Wt 70.4 kg (155 lb 3.2 oz)   SpO2 90%   BMI 25.83 kg/m      Renetta Santiago, RT      "

## 2022-03-29 NOTE — PROGRESS NOTES
Pt given Duonebs as ordered inline with AEROBIKA.  Pt on 4LPM NC.  O2 sats low 90's, RR-18-22, BS diminished with exp wheeze, cs at times.  Will continue to monitor pt.

## 2022-03-29 NOTE — PROGRESS NOTES
[facility] Care Coordination Contact    [facility]  Ambulatory Care Coordination to Inpatient Care Management   Hand-In Communication    Date:  March 29, 2022  Name: Mary Kay Tejada is enrolled in [facility] Care Coordination program and I am the Lead Care Coordinator.  CC Contact Information:.   Payor Source: Payor: MEDICA / Plan: MEDICA [employer] NON/[employer] PARTNERS / Product Type: Indemnity /   Current services in place:     Please see the CC Snaphot and Care Management Flowsheets for specific  details of this Mary Kay Tejada care plan.   Additional details/specific concerns r/t this admission:    No additional concerns at this time Mbr has PCA at 2.25 hrs daily and 3 hours of homemaking services weekly. She has a health alert.     I will follow this admission in Epic. Please feel free to contact me with questions or for further collaboration in discharge planning.    Samantha Alexandra RN, PHN   [facility]  [phone]

## 2022-03-29 NOTE — PROGRESS NOTES
Pt stable on baseline home O2 4L NC. Lungs clear/diminished with intermittent coarse crackles. SOB with activity. NSR with BBB and BP stable. A/O x4 and afebrile. No BM this shift. Voiding independently. Tolerating current diet. No new skin issues noted. No other concerns at this time.

## 2022-03-29 NOTE — PROGRESS NOTES
TRANSITIONS OF CARE (LETICIA) LOG   LETICIA tasks should be completed by the CC within one (1) business day of notification of each transition. Follow up contact with member is required after return to their usual care setting.  Note:  If CC finds out about the transitions fifteen (15) days or more after the member has returned to their usual care setting, no LETICIA log is needed. However, the CC should check in with the member to discuss the transition process, any changes needed to the care plan and document it in a case note.    Member Name:  Mary Kay Tejada [facility] Name:  [employer]Health Plan Member ID#: [id number]   Product: [facility] Care Coordinator Contact:  Samantha Alexandra RN, PHN Agency/[facility]/Care System: [facility]   Transition Communication Actions from Care Management Contact   Transition #1   Notification Date: 3/29/22 Transition Date:   3/26/22 Transition From: Home     Is this the member s usual care setting?               yes Transition To: [facility]   Transition Type:  Unplanned  Reason for Admission/Comments:  Shortness of breath  Contact member/responsible party to offer assistance with transition Date completed: 3/29/22: Room phone number [phone]. Attempted to reach member and no answer.    Notes from conversation with the member/responsible party, provider, discharging and receiving facility (as applicable): Per chart review, anticipate discharge 3/30/22.     Shared CC contact info, care plan/services with receiving setting--Date completed: 3/29/22   Name & Title of receiving setting contact: [facility] care management   Notified PCP of transition--Date completed:  3/29/22     via  EMR  Name of PCP: Cammy Bui     Transition #2   Notification Date: 3/31/22 Transition Date:   3/31/22 Transition From: [facility]     Is this the member s usual care setting?               no  Transition To: Home   Transition Type:  Planned  Reason for Admission/Comments:  COPD flare up  Contact member/responsible party to offer assistance with transition Date completed:     4/1/22: Attempted to reach member, no answer and left voice mail. Will call member again next week if no call back.   Per chart review, she has follow up appointment with PCP 4/6/22.    Notes from conversation with the member/responsible party, provider, discharging and receiving facility (as applicable): NA       Shared CC contact info, care plan/services with receiving setting--Date completed: ELOISA   Name & Title of receiving setting contact: NA   Notified PCP of transition--Date completed:  4/1/22     via  EMR  Name of PCP: Cammy Bui               *RETURN TO USUAL CARE SETTING: *Complete tasks below when the member is discharging TO their usual care setting within one (1) business day of notification..      For situations where the Care Coordinator is notified of the discharge prior to the date of discharge, the Care Coordinator must follow up with the member or designated representative to confirm that discharge actually occurred and discuss required LETICIA tasks as outlined in the LETICIA Instructions.  (This includes situations where it may be a  new  usual care setting for the member. (i.e., a community member who decides upon permanent nursing home placement following hospitalization and rehab).    Discuss with Member/Responsible Party:    Check  Yes  - if the member, family member and/or SNF/facility staff manages the following:    If  No  provide explanation in the comments section.          Date completed: 4/5/22 Communicated with member or their designated representative about the following:  care transition process; about changes to the member s health status; plan of care updates; education about transitions and how to prevent unplanned transitions/readmissions    Four Pillars for Optimal Transition:    Check   Yes  - if the member, family member and/or SNF/facility staff manages the following:    If  No  provide explanation in the comments section.          [x]  Yes     []  No Does the member have a follow-up appointment scheduled with primary care or specialist? (Mental health hospitalizations--the appt. should be w/in 7 days)              For mental health hospitalizations:  []  Yes     []  No     Does the member have a follow-up appointment scheduled with a mental health practitioner within 7 days of discharge?  [x]  Yes     []  No     Has a medication review been completed with member? If no, refer to PCP, home care nurse, MTM, pharmacist  [x]  Yes     []  No     Can the member manage their medications or is there a system in place to manage medications (e.g. home care set-up)?         [x]  Yes     []  No     Can the member verbalize warning signs and symptoms to watch for and how to respond?  [x]  Yes     []  No     Does the member have a copy of and understand their discharge instructions?  If no, assist to obtain copy of discharge instructions, review discharge instructions, and assist to contact PCP to discuss questions about their recent hospitalization.  [x]  Yes     []  No     Does the member have adequate food, housing and transportation?  If no, add goal and discuss additional supports available to the member                                                                                                                                                                                 [x]  Yes     []  No     Is the member safe in their home?  If no, document needs and support provided                                                                                                                                                                          [x]  Yes     []  No     Are there any concerns of vulnerability, abuse, or neglect?  If yes, document concerns and actions taken by Care Coordinator as a mandated  "                                                                                                                                                                             [x]  Yes     []  No     Does the member use a Personal Health Care Record?  Check  Yes  if visit summary, discharge summary, and/or healthcare summary are being used as a PHR.                                                                                                                                                                                  [x]  Yes     []  No     Have you reviewed the discharge summary with the member? If  No  provide explanation in comments.  [x]  Yes     []  No     Have you updated the member s care plan/support plan? Add new diagnosis, medications, treatments, goals & interventions, as applicable. If No, provide explanation in comments.    Comments:           Notes from conversation with the member/responsible party, provider, discharging and receiving facility (as applicable):     4/5/22: Spoke with member. She had her appointment rescheduled for 4/25/22 because they \"messed up on the release date\". She was reminded to contact PCC of any health concerns or follow up earlier. She is using oxygen at 4L/min. Doing fine with 4L/min and does not use the 5L/min. Member asked about a hospital bed. Need the head of the bed to be elevated when lying down. She is using extra pillows as needed. Explained to member that this is a Medicare item and she will need to work with PCP. She verbalized understanding and will talk to PCP at the next office visit.       No need for any other DME at this time.           Samantha Alexandra RN, PHN   [facility]  [phone]    "

## 2022-03-29 NOTE — TELEPHONE ENCOUNTER
Original message suggests that budesonide-formoterol is not covered but Symbicort is (?).  Perhaps they only cover the brand name?    Will try to send prescription for brand name.

## 2022-03-30 ENCOUNTER — APPOINTMENT (OUTPATIENT)
Dept: PHYSICAL THERAPY | Facility: CLINIC | Age: 72
DRG: 193 | End: 2022-03-30
Attending: INTERNAL MEDICINE
Payer: COMMERCIAL

## 2022-03-30 LAB
GLUCOSE BLDC GLUCOMTR-MCNC: 156 MG/DL (ref 70–99)
GLUCOSE BLDC GLUCOMTR-MCNC: 159 MG/DL (ref 70–99)
GLUCOSE BLDC GLUCOMTR-MCNC: 167 MG/DL (ref 70–99)
GLUCOSE BLDC GLUCOMTR-MCNC: 190 MG/DL (ref 70–99)
INR PPP: 1.75 (ref 0.85–1.15)
MAGNESIUM SERPL-MCNC: 1.8 MG/DL (ref 1.8–2.6)
POTASSIUM BLD-SCNC: 3.7 MMOL/L (ref 3.5–5)

## 2022-03-30 PROCEDURE — 999N000157 HC STATISTIC RCP TIME EA 10 MIN

## 2022-03-30 PROCEDURE — 250N000013 HC RX MED GY IP 250 OP 250 PS 637: Performed by: HOSPITALIST

## 2022-03-30 PROCEDURE — 97161 PT EVAL LOW COMPLEX 20 MIN: CPT | Mod: GP

## 2022-03-30 PROCEDURE — 250N000012 HC RX MED GY IP 250 OP 636 PS 637: Performed by: INTERNAL MEDICINE

## 2022-03-30 PROCEDURE — 250N000013 HC RX MED GY IP 250 OP 250 PS 637: Performed by: INTERNAL MEDICINE

## 2022-03-30 PROCEDURE — 94640 AIRWAY INHALATION TREATMENT: CPT | Mod: 76

## 2022-03-30 PROCEDURE — 250N000009 HC RX 250: Performed by: INTERNAL MEDICINE

## 2022-03-30 PROCEDURE — 84132 ASSAY OF SERUM POTASSIUM: CPT | Performed by: HOSPITALIST

## 2022-03-30 PROCEDURE — 94799 UNLISTED PULMONARY SVC/PX: CPT

## 2022-03-30 PROCEDURE — 36415 COLL VENOUS BLD VENIPUNCTURE: CPT | Performed by: HOSPITALIST

## 2022-03-30 PROCEDURE — 99232 SBSQ HOSP IP/OBS MODERATE 35: CPT | Performed by: INTERNAL MEDICINE

## 2022-03-30 PROCEDURE — 97116 GAIT TRAINING THERAPY: CPT | Mod: GP

## 2022-03-30 PROCEDURE — 83735 ASSAY OF MAGNESIUM: CPT | Performed by: HOSPITALIST

## 2022-03-30 PROCEDURE — 250N000011 HC RX IP 250 OP 636: Performed by: INTERNAL MEDICINE

## 2022-03-30 PROCEDURE — 258N000003 HC RX IP 258 OP 636: Performed by: INTERNAL MEDICINE

## 2022-03-30 PROCEDURE — 210N000002 HC R&B HEART CARE

## 2022-03-30 PROCEDURE — 85610 PROTHROMBIN TIME: CPT | Performed by: HOSPITALIST

## 2022-03-30 RX ORDER — IPRATROPIUM BROMIDE AND ALBUTEROL SULFATE 2.5; .5 MG/3ML; MG/3ML
3 SOLUTION RESPIRATORY (INHALATION) EVERY 4 HOURS PRN
Qty: 90 ML | Refills: 0 | Status: SHIPPED | OUTPATIENT
Start: 2022-03-30 | End: 2022-10-31

## 2022-03-30 RX ORDER — PREDNISONE 20 MG/1
40 TABLET ORAL DAILY
Qty: 8 TABLET | Refills: 0 | Status: SHIPPED | OUTPATIENT
Start: 2022-03-31 | End: 2022-05-25

## 2022-03-30 RX ORDER — CEFDINIR 300 MG/1
300 CAPSULE ORAL 2 TIMES DAILY
Qty: 8 CAPSULE | Refills: 0 | Status: SHIPPED | OUTPATIENT
Start: 2022-03-30 | End: 2022-04-03

## 2022-03-30 RX ORDER — PANTOPRAZOLE SODIUM 40 MG/1
40 TABLET, DELAYED RELEASE ORAL
Qty: 10 TABLET | Refills: 0 | Status: SHIPPED | OUTPATIENT
Start: 2022-03-31 | End: 2022-04-10

## 2022-03-30 RX ORDER — WARFARIN SODIUM 5 MG/1
5 TABLET ORAL
Status: COMPLETED | OUTPATIENT
Start: 2022-03-30 | End: 2022-03-30

## 2022-03-30 RX ADMIN — GABAPENTIN 600 MG: 600 TABLET, FILM COATED ORAL at 21:08

## 2022-03-30 RX ADMIN — FLUTICASONE FUROATE AND VILANTEROL TRIFENATATE 1 PUFF: 200; 25 POWDER RESPIRATORY (INHALATION) at 08:16

## 2022-03-30 RX ADMIN — GABAPENTIN 600 MG: 600 TABLET, FILM COATED ORAL at 14:01

## 2022-03-30 RX ADMIN — GUAIFENESIN 15 ML: 100 SOLUTION ORAL at 08:15

## 2022-03-30 RX ADMIN — GUAIFENESIN 15 ML: 100 SOLUTION ORAL at 21:08

## 2022-03-30 RX ADMIN — GUAIFENESIN 15 ML: 100 SOLUTION ORAL at 14:01

## 2022-03-30 RX ADMIN — IPRATROPIUM BROMIDE AND ALBUTEROL SULFATE 3 ML: 2.5; .5 SOLUTION RESPIRATORY (INHALATION) at 08:31

## 2022-03-30 RX ADMIN — PREDNISONE 40 MG: 20 TABLET ORAL at 08:16

## 2022-03-30 RX ADMIN — DILTIAZEM HYDROCHLORIDE 120 MG: 120 CAPSULE, COATED, EXTENDED RELEASE ORAL at 08:16

## 2022-03-30 RX ADMIN — IPRATROPIUM BROMIDE AND ALBUTEROL SULFATE 3 ML: 2.5; .5 SOLUTION RESPIRATORY (INHALATION) at 11:36

## 2022-03-30 RX ADMIN — AZITHROMYCIN MONOHYDRATE 500 MG: 500 INJECTION, POWDER, LYOPHILIZED, FOR SOLUTION INTRAVENOUS at 22:06

## 2022-03-30 RX ADMIN — FUROSEMIDE 20 MG: 20 TABLET ORAL at 08:16

## 2022-03-30 RX ADMIN — SUCRALFATE 1 G: 1 TABLET ORAL at 21:08

## 2022-03-30 RX ADMIN — GABAPENTIN 600 MG: 600 TABLET, FILM COATED ORAL at 08:16

## 2022-03-30 RX ADMIN — CEFTRIAXONE 1 G: 1 INJECTION, POWDER, FOR SOLUTION INTRAMUSCULAR; INTRAVENOUS at 21:08

## 2022-03-30 RX ADMIN — SUCRALFATE 1 G: 1 TABLET ORAL at 17:00

## 2022-03-30 RX ADMIN — GUAIFENESIN 15 ML: 100 SOLUTION ORAL at 05:34

## 2022-03-30 RX ADMIN — GUAIFENESIN 15 ML: 100 SOLUTION ORAL at 00:14

## 2022-03-30 RX ADMIN — SENNOSIDES AND DOCUSATE SODIUM 1 TABLET: 50; 8.6 TABLET ORAL at 08:16

## 2022-03-30 RX ADMIN — GUAIFENESIN 15 ML: 100 SOLUTION ORAL at 17:01

## 2022-03-30 RX ADMIN — SUCRALFATE 1 G: 1 TABLET ORAL at 08:16

## 2022-03-30 RX ADMIN — PANTOPRAZOLE SODIUM 40 MG: 20 TABLET, DELAYED RELEASE ORAL at 08:16

## 2022-03-30 RX ADMIN — ATORVASTATIN CALCIUM 20 MG: 10 TABLET, FILM COATED ORAL at 21:08

## 2022-03-30 RX ADMIN — WARFARIN SODIUM 5 MG: 5 TABLET ORAL at 17:00

## 2022-03-30 RX ADMIN — IPRATROPIUM BROMIDE AND ALBUTEROL SULFATE 3 ML: 2.5; .5 SOLUTION RESPIRATORY (INHALATION) at 20:13

## 2022-03-30 RX ADMIN — SUCRALFATE 1 G: 1 TABLET ORAL at 12:21

## 2022-03-30 RX ADMIN — IPRATROPIUM BROMIDE AND ALBUTEROL SULFATE 3 ML: 2.5; .5 SOLUTION RESPIRATORY (INHALATION) at 16:04

## 2022-03-30 ASSESSMENT — ACTIVITIES OF DAILY LIVING (ADL)
ADLS_ACUITY_SCORE: 5

## 2022-03-30 NOTE — PROGRESS NOTES
Pt given Duoneb as ordered.  Nebs given inline with AEROBIKA.  Pt is on 4 LPM NC, o2 sats low 90's, BS diminished with exp wheeze, sl cs at times.  Will continue to monitor pt.

## 2022-03-30 NOTE — PROGRESS NOTES
03/30/22 1600   Quick Adds   Type of Visit Initial PT Evaluation   Living Environment   People in Home child(david), adult  (Son and his SO)   Current Living Arrangements house   Home Accessibility no concerns   Living Environment Comments Able to stay on one level   Self-Care   Usual Activity Tolerance moderate   Current Activity Tolerance moderate   Regular Exercise No   Equipment Currently Used at Home none   Fall history within last six months no   Activity/Exercise/Self-Care Comment Reports independent with household mobility without AD at baseline.   General Information   Onset of Illness/Injury or Date of Surgery 03/26/22   Referring Physician Dr Jillian Agrawal   Patient/Family Therapy Goals Statement (PT) None stated   Pertinent History of Current Problem (include personal factors and/or comorbidities that impact the POC) Admit for dyspnea and cough   Existing Precautions/Restrictions oxygen therapy device and L/min  (4L cont O2 via NC)   Pain Assessment   Patient Currently in Pain No   Transfers   Impairments Contributing to Impaired Transfers decreased strength   Comment, (Transfers) Sit<>stand edge of bed to stand without AD independent.   Gait/Stairs (Locomotion)   [place] Level (Gait) contact guard   Assistive Device (Gait) walker, front-wheeled   Distance in Feet (Required for LE Total Joints) 100'x1   Pattern (Gait) step-to   Comment, (Gait/Stairs) Pt on 4L cont O2 via NC. Sats at rest = 93%, after amb = 90%.   Clinical Impression   Criteria for Skilled Therapeutic Intervention Yes, treatment indicated   PT Diagnosis (PT) Impaired functional mobility   Influenced by the following impairments weakness, shortness of breath   Functional limitations due to impairments gait   Clinical Presentation (PT Evaluation Complexity) Evolving/Changing   Clinical Presentation Rationale Presents as medically diagnosed   Clinical Decision Making (Complexity) low complexity   Planned Therapy Interventions (PT) home  exercise program;gait training;strengthening   Anticipated Equipment Needs at Discharge (PT) walker, rolling   Risk & Benefits of therapy have been explained evaluation/treatment results reviewed;care plan/treatment goals reviewed;participants included;patient   PT Discharge Planning   PT Discharge Recommendation (DC Rec) home with assist   PT Rationale for DC Rec Assist from son and his SO as needed.

## 2022-03-30 NOTE — PROGRESS NOTES
[facility] MEDICINE PROGRESS NOTE      Identification/Summary: Mary Kay Tejada is a 72 year old female with a past medical history of COPD, HTN, HLD, afib on warfarin, moderate aortic stenosis, history of HFpEF who was admitted on 3/26/2022 for acute on chronic hypoxic respiratory failure and acute hypercapnic respiratory failure from COPD acute exacerbation requiring BiPAP, pneumonia. Hospital course is notable for utilizing BiPAP.  She is feeling improved, but still continues to have a persistent congested cough. Discharge pending O2 assessment with activity.     Assessment and Plan:  Acute COPD Exacerbation  Acute on Chronic Hypoxic Respiratory Failure requiring BiPAP (baseline is 4 L)  Acute Hypercapnic/Hypercarbic Respiratory Failure, requiring BiPAP resolved/improving (pH 7.41 now on blood gas)  Continue steroids changed to prednisone  Continue nebs  Continue ceftriaxone, azithromycin  At baseline oxygen 4 L  Home Lasix restarted  O2 assessment and titration with activity per respiratory or nursing staff.     CAP NA  -Continue ceftriaxone and azithromycin.  -Strep p antigen is positive; thus, continue antibiotic coverage.  -Would plan for total 7-day course.      HTN  -Order home medications and as needed apply specified hold parameters.      HLD  -Order home statin.     Atrial Fibrillation   Continue Cardizem CD, warfarin     Diet: Moderate Consistent Carb (60 g CHO per Meal) Diet  DVT Prophylaxis: On warfarin  Code Status: Full Code    Anticipate discharge today or tomorrow pending O2 assessment with activity       The patient's care was discussed with the Attending Physician, Dr. Agrawal.    Denisse Richey  PA Student  Hospital Medicine  [facility]  Phone: #[phone]    Interval History/Subjective:  Pt reports that she is continuing to feel improved and is utilizing her baseline O2 setting of 4L. She has shortness of breath and dizziness with  "activity, but is not wearing O2 when she is walking. Productive cough continues and pt feels that she is close to \"clearing\" the phlegm. She denies chest pain, worsening SOB, abdominal pain, headaches, or urinary complaints. Desiring discharge this afternoon or tomorrow pending O2 assessment.    Physical Exam/Objective:  Temp:  [97.3  F (36.3  C)-98.2  F (36.8  C)] 97.3  F (36.3  C)  Pulse:  [61-84] 78  Resp:  [18-22] 22  BP: (122-145)/(58-74) 133/74  SpO2:  [86 %-96 %] 94 %  Body mass index is 25.46 kg/m .    GENERAL:  Alert, appears comfortable, in no acute distress, appears stated age   HEAD:  Normocephalic, without obvious abnormality, atraumatic   EYES:  PERRL, conjunctiva/corneas clear, no scleral icterus, EOM's intact   THROAT: Moist oral mucosa   LUNGS:   Respirations unlabored. Coarse lung sounds with diffuse expiratory wheezes   CHEST WALL:  No tenderness or deformity   HEART:  Regular rate and rhythm, S1 and S2 normal, no murmur   ABDOMEN:   Soft, non-tender, no masses, no organomegaly, no rebound or guarding   EXTREMITIES: Trace edema    SKIN: Dry to touch, no exanthems in the visualized areas   NEURO: Alert, oriented x3, moves all four extremities freely   PSYCH: Cooperative, behavior is appropriate      Data reviewed today: I personally reviewed all new medications, labs, imaging/diagnostics reports over the past 24 hours. Pertinent findings include:    Imaging:   No results found for this or any previous visit (from the past 24 hour(s)).    Medications:   Personally Reviewed.  Medications     - MEDICATION INSTRUCTIONS -       Warfarin Therapy Reminder         atorvastatin  20 mg Oral At Bedtime     azithromycin  500 mg Intravenous Q24H     cefTRIAXone  1 g Intravenous Q24H     diltiazem ER COATED BEADS  120 mg Oral Daily     fluticasone-vilanterol  1 puff Inhalation Daily     furosemide  20 mg Oral Daily     gabapentin  600 mg Oral TID     guaiFENesin  15 mL Oral Q4H     insulin aspart  1-7 Units " Subcutaneous TID AC     insulin aspart  1-5 Units Subcutaneous At Bedtime     ipratropium - albuterol 0.5 mg/2.5 mg/3 mL  3 mL Nebulization 4x daily     pantoprazole  40 mg Oral QAM AC     predniSONE  40 mg Oral Daily     senna-docusate  1 tablet Oral Daily     sodium chloride (PF)  3 mL Intracatheter Q8H     sucralfate  1 g Oral 4x Daily AC & HS

## 2022-03-30 NOTE — PROGRESS NOTES
Betsy did well throughout the day. Up independent in her room. On 4-5 liters nasal cannula which is baseline. Noted this morning she wasn't quite feeling back to normal. We ambulated in the hallway this afternoon and she got rather dizzy/lightheaded and short of breath.     Plan for hopeful discharge home tomorrow, will continue to monitor.

## 2022-03-30 NOTE — PLAN OF CARE
Patient independent, frequently ambulating in her room. Ambulated with staff in the hallway for a short distance. Patient still feel more SOB without O2 walking than she does at baseline.    Patient concerned about her blood glucose being elevated. Explained that the steroids were causing increased sugars.     Patient preparing for discharge tomorrow.  Yolis Conte RN on 3/29/2022 at 7:13 PM      Problem: Functional Ability Impaired COPD (Chronic Obstructive Pulmonary Disease)  Goal: Optimal Level of Functional [place]  Outcome: Ongoing, Progressing  Intervention: Optimize Functional Ability  Recent Flowsheet Documentation  Taken 3/29/2022 0830 by Yolis Conte RN  Activity Management: ambulated in room     Problem: Oral Intake Inadequate COPD (Chronic Obstructive Pulmonary Disease)  Goal: Improved Nutrition Intake  Outcome: Ongoing, Progressing     Problem: Respiratory Compromise COPD (Chronic Obstructive Pulmonary Disease)  Goal: Effective Oxygenation and Ventilation  Outcome: Ongoing, Progressing  Intervention: Promote Airway Secretion Clearance  Recent Flowsheet Documentation  Taken 3/29/2022 1612 by Yolis Conte RN  Cough And Deep Breathing: done independently per patient  Taken 3/29/2022 1300 by Yolis Conte RN  Cough And Deep Breathing: done independently per patient  Taken 3/29/2022 0830 by Yolis Conte RN  Cough And Deep Breathing: done independently per patient  Activity Management: ambulated in room   Goal Outcome Evaluation:

## 2022-03-30 NOTE — PROGRESS NOTES
Patient was walking to BR upon my entrance to her room. She was on RA and upon return to bed her oxygen saturation was 86%. Oxygen reapplied at 4.5 L NC with subsequent saturation of 90%. Scheduled Duoneb was given per orders. BS diminished pre/post neb. HR 66-80, RR 18. Worked with patient on her Aerobika flutter valve. Patient with good understanding of device. Instructed patient how to clean device as this had not been completed yet.  RT will continue to monitor and give nebs as ordered.    Rebecca Mondragon, RRT, CPDS, CTTS

## 2022-03-31 VITALS
RESPIRATION RATE: 20 BRPM | HEART RATE: 70 BPM | BODY MASS INDEX: 25.46 KG/M2 | SYSTOLIC BLOOD PRESSURE: 127 MMHG | TEMPERATURE: 98.2 F | DIASTOLIC BLOOD PRESSURE: 62 MMHG | HEIGHT: 65 IN | OXYGEN SATURATION: 91 % | WEIGHT: 152.8 LBS

## 2022-03-31 LAB
GLUCOSE BLDC GLUCOMTR-MCNC: 108 MG/DL (ref 70–99)
GLUCOSE BLDC GLUCOMTR-MCNC: 75 MG/DL (ref 70–99)
INR PPP: 1.89 (ref 0.85–1.15)
MAGNESIUM SERPL-MCNC: 1.6 MG/DL (ref 1.8–2.6)
POTASSIUM BLD-SCNC: 3 MMOL/L (ref 3.5–5)
POTASSIUM BLD-SCNC: 4 MMOL/L (ref 3.5–5)

## 2022-03-31 PROCEDURE — 83735 ASSAY OF MAGNESIUM: CPT | Performed by: HOSPITALIST

## 2022-03-31 PROCEDURE — 94640 AIRWAY INHALATION TREATMENT: CPT | Mod: 76

## 2022-03-31 PROCEDURE — 999N000033 HC STATISTIC CHRONIC PULMONARY DISEASE SPECIALIST

## 2022-03-31 PROCEDURE — 250N000013 HC RX MED GY IP 250 OP 250 PS 637: Performed by: INTERNAL MEDICINE

## 2022-03-31 PROCEDURE — 99407 BEHAV CHNG SMOKING > 10 MIN: CPT

## 2022-03-31 PROCEDURE — 94799 UNLISTED PULMONARY SVC/PX: CPT

## 2022-03-31 PROCEDURE — 999N000157 HC STATISTIC RCP TIME EA 10 MIN

## 2022-03-31 PROCEDURE — 36415 COLL VENOUS BLD VENIPUNCTURE: CPT | Performed by: HOSPITALIST

## 2022-03-31 PROCEDURE — 250N000013 HC RX MED GY IP 250 OP 250 PS 637: Performed by: HOSPITALIST

## 2022-03-31 PROCEDURE — 85610 PROTHROMBIN TIME: CPT | Performed by: HOSPITALIST

## 2022-03-31 PROCEDURE — 250N000012 HC RX MED GY IP 250 OP 636 PS 637: Performed by: INTERNAL MEDICINE

## 2022-03-31 PROCEDURE — 250N000009 HC RX 250: Performed by: INTERNAL MEDICINE

## 2022-03-31 PROCEDURE — 99239 HOSP IP/OBS DSCHRG MGMT >30: CPT | Performed by: INTERNAL MEDICINE

## 2022-03-31 PROCEDURE — 84132 ASSAY OF SERUM POTASSIUM: CPT | Performed by: HOSPITALIST

## 2022-03-31 RX ORDER — WARFARIN SODIUM 5 MG/1
5 TABLET ORAL
Status: DISCONTINUED | OUTPATIENT
Start: 2022-03-31 | End: 2022-03-31 | Stop reason: HOSPADM

## 2022-03-31 RX ORDER — POTASSIUM CHLORIDE 1500 MG/1
20 TABLET, EXTENDED RELEASE ORAL ONCE
Status: COMPLETED | OUTPATIENT
Start: 2022-03-31 | End: 2022-03-31

## 2022-03-31 RX ORDER — POTASSIUM CHLORIDE 1500 MG/1
40 TABLET, EXTENDED RELEASE ORAL ONCE
Status: COMPLETED | OUTPATIENT
Start: 2022-03-31 | End: 2022-03-31

## 2022-03-31 RX ADMIN — POTASSIUM CHLORIDE 40 MEQ: 1500 TABLET, EXTENDED RELEASE ORAL at 06:13

## 2022-03-31 RX ADMIN — SENNOSIDES AND DOCUSATE SODIUM 1 TABLET: 50; 8.6 TABLET ORAL at 09:47

## 2022-03-31 RX ADMIN — POTASSIUM CHLORIDE 20 MEQ: 1500 TABLET, EXTENDED RELEASE ORAL at 09:47

## 2022-03-31 RX ADMIN — IPRATROPIUM BROMIDE AND ALBUTEROL SULFATE 3 ML: 2.5; .5 SOLUTION RESPIRATORY (INHALATION) at 11:41

## 2022-03-31 RX ADMIN — IPRATROPIUM BROMIDE AND ALBUTEROL SULFATE 3 ML: 2.5; .5 SOLUTION RESPIRATORY (INHALATION) at 08:38

## 2022-03-31 RX ADMIN — SUCRALFATE 1 G: 1 TABLET ORAL at 11:39

## 2022-03-31 RX ADMIN — MECLIZINE HYDROCHLORIDE 25 MG: 25 TABLET ORAL at 00:01

## 2022-03-31 RX ADMIN — FLUTICASONE FUROATE AND VILANTEROL TRIFENATATE 1 PUFF: 200; 25 POWDER RESPIRATORY (INHALATION) at 08:43

## 2022-03-31 RX ADMIN — GABAPENTIN 600 MG: 600 TABLET, FILM COATED ORAL at 14:16

## 2022-03-31 RX ADMIN — SUCRALFATE 1 G: 1 TABLET ORAL at 09:47

## 2022-03-31 RX ADMIN — PANTOPRAZOLE SODIUM 40 MG: 20 TABLET, DELAYED RELEASE ORAL at 09:47

## 2022-03-31 RX ADMIN — PREDNISONE 40 MG: 20 TABLET ORAL at 09:47

## 2022-03-31 RX ADMIN — GABAPENTIN 600 MG: 600 TABLET, FILM COATED ORAL at 09:47

## 2022-03-31 RX ADMIN — GUAIFENESIN 15 ML: 100 SOLUTION ORAL at 09:47

## 2022-03-31 RX ADMIN — DILTIAZEM HYDROCHLORIDE 120 MG: 120 CAPSULE, COATED, EXTENDED RELEASE ORAL at 09:47

## 2022-03-31 RX ADMIN — FUROSEMIDE 20 MG: 20 TABLET ORAL at 09:47

## 2022-03-31 ASSESSMENT — ACTIVITIES OF DAILY LIVING (ADL)
ADLS_ACUITY_SCORE: 5
ADLS_ACUITY_SCORE: 7
ADLS_ACUITY_SCORE: 5
ADLS_ACUITY_SCORE: 7
ADLS_ACUITY_SCORE: 5

## 2022-03-31 NOTE — PROGRESS NOTES
CC: 35 yo  female, here for AE    HPI: Vicky is overall well today.  She is a  s/p . Last pap smear was in , NILM. Denies history of abnormal pap smears. Cycle finished on Friday, now having vulvar irritation. Unsure if related to pad use. Kids are 13,11 and 8. Regular cycles, not sexually active. Has type 2 DM and A1C is 6.2.     Past Medical History:   Diagnosis Date    Diabetes mellitus type II     Headache(784.0)     Hypertension     Myasthenia gravis without exacerbation     Obesity     Other and unspecified hyperlipidemia        Past Surgical History:   Procedure Laterality Date    SPINE SURGERY      THYMECTOMY         OB History        3    Para   3    Term                AB        Living           SAB        TAB        Ectopic        Multiple        Live Births                     Current Outpatient Medications on File Prior to Visit   Medication Sig Dispense Refill    azaTHIOprine (IMURAN) 50 mg Tab Take 2 tablets (100 mg total) by mouth 2 (two) times daily. 360 tablet 3    blood sugar diagnostic Strp 1 strip by Misc.(Non-Drug; Combo Route) route 2 (two) times daily before meals. 100 strip 3    calcium carbonate (OS-MARISOL) 500 mg calcium (1,250 mg) tablet Take 1 tablet (500 mg total) by mouth 2 (two) times daily. 180 tablet 3    gabapentin (NEURONTIN) 100 MG capsule TAKE 1 CAPSULE (100 MG TOTAL) BY MOUTH 3 (THREE) TIMES DAILY. 270 capsule 2    glipiZIDE (GLUCOTROL) 10 MG tablet TAKE 1 TABLET BY MOUTH DAILY WITH BREAKFAST 90 tablet 3    lisinopril 10 MG tablet Take 1 tablet (10 mg total) by mouth once daily. 90 tablet 3    metformin (GLUCOPHAGE) 1000 MG tablet Take 1 tablet (1,000 mg total) by mouth 2 (two) times daily with meals. 180 tablet 3    nystatin (MYCOSTATIN) cream Apply topically 2 (two) times daily. FOR YEAST INFECTION UNDER BREASTS 60 g 3    pantoprazole (PROTONIX) 40 MG tablet       predniSONE (DELTASONE) 10 MG tablet Take 2 tablets (20 mg total) by  Physical Therapy Discharge Summary    Reason for therapy discharge:    Discharged to home with home therapy.    Progress towards therapy goal(s). See goals on Care Plan in [facility] electronic health record for goal details.  Goals not met.  Barriers to achieving goals:   eval only.    Therapy recommendation(s):    No further therapy is recommended.       "mouth once daily. 180 tablet 3    pyridostigmine (MESTINON) 180 mg TbSR TAKE 1 TABLET (180 MG TOTAL) BY MOUTH ONCE DAILY. 30 tablet 5    pyridostigmine (MESTINON) 60 mg Tab TAKE 1/2 TABLET BY MOUTH EVERY 4 HOURS 120 tablet 5    rosuvastatin (CRESTOR) 20 MG tablet TAKE 1 TABLET (20 MG TOTAL) BY MOUTH ONCE DAILY. 90 tablet 3    vitamin D 1000 units Tab Take 185 mg by mouth once daily.       No current facility-administered medications on file prior to visit.          ROS:  GENERAL: Feeling well overall.   SKIN: Denies rash or lesions.   HEAD: Denies head injury or headache.   CHEST: Denies chest pain or shortness of breath.   CARDIOVASCULAR: Denies palpitations or left sided chest pain.   ABDOMEN: No abdominal pain  URINARY: No frequency, dysuria, hematuria or burning on urination.  REPRODUCTIVE: See HPI.   HEMATOLOGIC: No easy bruisability or excessive bleeding.   MUSCULOSKELETAL: Denies joint pain or swelling.   NEUROLOGIC: Denies syncope or weakness.   PSYCHIATRIC: Denies depression, anxiety or mood swings.    Physical Exam:   /74   Ht 5' 6" (1.676 m)   Wt 80.3 kg (177 lb 0.5 oz)   BMI 28.57 kg/m²   General: No distress, well appearing  HEENT: normocephalic, atraumatic   Heart: Regular rate  Lungs: No increased work of breathing  Breasts: Symmetrical, no masses, discharge or skin retractions.   Abdomen: soft, nontender, no masses  MS: lower extremeties symmetrical, no edema  Pelvic Exam:     GENITALIA: There is some external irritation that is symmetric in appearance - possible contact dermatitis vs fungal infection.    URETHRA: normal appearing   Bladder non tender   VAGINA: normal vaginal mucosa, no lesions   CERVIX: no lesions visible, no CMT    UTERUS: small, mobile, non tender   ADNEXA: no adnexal masses, tenderness or fullness  PSYCH: Normal affect, mood appropriate     ASSESSMENT/PLAN: 35 yo  here for AE and vulvar irritation.     1. Pap and HPV co-testing collected and sent. If negative, " can repeat in 5 years.   2. Mammogram screening at age 40  3. Flu shot up to date  4. Not SA    Vulvar irritation  - May be contact dermatitis secondary to pad use. Reviewed recommendation for hydrocortisone 1%. Can also try A&D ointment or desitin.  - Affirm collected and sent. Will let her know results.     Pattie Bowen MD  Obstetrics and Gynecology  [facility]

## 2022-03-31 NOTE — PROVIDER NOTIFICATION
03/30/22 2013   Tech Time   $Tech Time (10 minute increments) 2   Nebulizer Assessment & Treatment   $RT Use ONLY Delivery Method Nebulizer - Additional   Nebulizer Device Mouthpiece   Pretreatment Heart Rate (beats/min) 64   Pretreatment Resp Rate (breaths/min) 18   Pretreatment O2 sats - (TCU only) 94   Pretreat Breath Sounds - Bilat - All Lobes diminished   Breath Sounds Post-Respiratory Treatment   Posttreatment Heart Rate (beats/min) 74   Posttreatment Resp Rate (breaths/min) 18   Post treatment O2 Sats - (TCU only) 97   Posttreatment Assessment (SVN) patient reports breathing improved   Signs of Intolerance (SVN) none   Positive Expiratory Pressure (PEP)   Daily Review of Necessity (PEP Therapy) completed   Device (PEP Therapy) Aerobika   Route (PEP Therapy) instruction provided, follow-up;mouthpiece utilized;proper technique demonstrated   Settings (PEP Therapy) PEP 2   Patient Position sitting on edge of bed   Posttreatment Assessment (PEP) patient reports breathing improved   Signs of Intolerance (PEP Therapy) none

## 2022-03-31 NOTE — PROGRESS NOTES
Went over discharge instructions with pt and caregiver. Answered all questions. Removed IV. Pt sent home with her belongings.

## 2022-03-31 NOTE — PROGRESS NOTES
SPIRITUAL HEALTH SERVICES Progress Note  WW 3 [place]    Brief initial visit to introduce self/role. Pt declined, asserting that she is feeling better each day and has no present concerns.     Joaquin Silva M.Div., ACPE   Staff  & Clinical Pastoral Educator

## 2022-03-31 NOTE — CONSULTS
Tobacco Treatment Consult  3/31/2022, 2:03 PM    Patient Admitted for: COPD exacerbation (H) [J44.1]  Acute on chronic respiratory failure with hypoxia (H) [J96.21]  Pneumonia of right lower lobe due to infectious organism [J18.9]  Dyspnea, unspecified type [R06.00] on 3/26/2022    History of Tobacco Use:   Tobacco Product(s):cigarette  Amount used: 6 cigs/day  Number of quit attempts in past: numerous but patient states that none were in earnest  Longest period of without use: 1 month  Pharmacotherapy tried in the past: patch and gum  Pharmacotherapy tried that has been beneficial: patch but must be the kind for sensitive skin  Pharmacotherapy tried that has not been beneficial or was not tolerated: gum because it makes her nauseous  Fagerstrom Score: 5 Level of dependence 4-7 moderate  Medical co-morbidities impacting tobacco use: depression    Assessment:   Importance of quitting to patient: 10  Current confidence in ability to quit: 8  Readiness to quit/planning to quit: ready  Reasons for wanting to quit: health and it would make her family happy  Emotional Triggers:anger and nervous or stressed  Physical and behavioral triggers: drinking coffee, eating a meal and finishing a task  Nicotine withdrawal symptoms experiencing as an inpatient: irritability  Current major life stressors: experiencing a major health problem   Cecilia expresses that she is ready to quit and that she has a strong support system of family and friends that will support her in doing so. She states that she is ready to make an earnest effort.    Education done during visit:  -Discussed triggers and response to them  -Discussed benefits to quitting tobacco use  -Educated on physical benefits to health of tobacco cessation  -Education on use of pharmacotherapy products and discussed options to determine what may work best for patient.  -Discussed barriers to quitting and how patient feels they may overcome them  -Educated on benefits of  having a support system and remembering their reasons for quitting  -Issued tobacco cessation materials and resource information.      Recommendations:  -Upon discharge recommend the patient have the following pharmacotherapy: Patch 21 mg if sensitive skin patches are an option and if patient tolerates the. Bupropion or Varenicline might also be an appropriate consideration.  -Taper NRT recommended after 4-6 weeks of successful cessation.  Patch down to 14 mg for 4 weeks then down to 7 mg for after 2  weeks.  -Continued cessation therapy by this service via phone  -Continued encouragement from health care providers to quit and stay tobacco free.      Mary Kay will participate in follow-up calls post-discharge. Will continue to be available to patient throughout hospital stay.    Total 20 minutes spent in smoking cessation, and 30 minutes spent in chart review,care coordination, and documentation.    Aamir Ross RT, Chronic Pulmonary Disease Specialist, Tobacco Treatment Specialist  Phone [phone]

## 2022-03-31 NOTE — PLAN OF CARE
Problem: Adjustment to Illness COPD (Chronic Obstructive Pulmonary Disease)  Goal: Optimal Chronic Illness Coping  Outcome: Ongoing, Progressing     Problem: Functional Ability Impaired COPD (Chronic Obstructive Pulmonary Disease)  Goal: Optimal Level of Functional [place]  Outcome: Ongoing, Progressing     Problem: Infection COPD (Chronic Obstructive Pulmonary Disease)  Goal: Absence of Infection Signs and Symptoms  Outcome: Ongoing, Progressing   Goal Outcome Evaluation:

## 2022-03-31 NOTE — PROGRESS NOTES
Care Management Discharge Note    Discharge Date: 03/31/2022       Discharge Disposition: Home    Discharge Services: None    Discharge DME: Other (see comment) (has home oxygen, requested portable tank)    Discharge Transportation: family or friend will provide    Private pay costs discussed: Not applicable    PAS Confirmation Code:  (Not needed)  Patient/family educated on Medicare website which has current facility and service quality ratings: no    Education Provided on the Discharge Plan:  Yes, per team  Persons Notified of Discharge Plans: patient   Patient/Family in Agreement with the Plan: yes    Handoff Referral Completed: No    Additional Information:  Plan to discharge to home today via family. No service needs identified. Has home oxygen through [employer]; called to request portable oxygen tank for transport, they will deliver to hospital. Notified floor nurse. Met with patient in room who stated she does not need portable tank, doesn't like to be transported with one and has an inhaler if needed. Writer explained that staff was worried about transport home with oxygen; patient stated understanding, but also explained that is what her inhaler is for. Patient plans to contact [employer] as well to determine size of portable tank and update that they will not need to come refill tanks at home on Friday as she has been in the hospital.         Sanam Rabago RN

## 2022-03-31 NOTE — DISCHARGE SUMMARY
[facility] MEDICINE  DISCHARGE SUMMARY     Primary Care Physician: Cammy Bui  Admission Date: 3/26/2022   Discharge Provider: Jillian Agrawal MD Discharge Date: 3/31/2022   Diet: Orders Placed This Encounter      Moderate Consistent Carb (60 g CHO per Meal) Diet      Diet      Code Status: Full Code   Activity: activity as tolerated   [facility]      Condition at Discharge: Stable      REASON FOR PRESENTATION(See Admission Note for Details)   Shortness of breath     PRINCIPAL & ACTIVE DISCHARGE DIAGNOSES     Active Problems:    COPD exacerbation (H)    Acute on chronic respiratory failure with hypoxia (H)  Streptococcal pneumonia of right lower lobe due to infectious organism    Acute and chronic respiratory failure with hypercapnia (H)    Paroxysmal Atrial Fibrillation on Coumadin  Hypomagnesemia replaced  Moderate aortic valve stenosis  Mild mitral stenosis  Normocytic anemia  Hypertension  Dyslipidemia      SIGNIFICANT FINDINGS (Imaging, labs):   Echocardiogram  1. Left ventricular function is normal.The ejection fraction is 60-65%. The   left ventricle is normal in size. The left ventricle is not well visualized.   2. Normal right ventricle size and systolic function.   3. There is mild mitral stenosis. There is severe mitral annular   calcification.   4. Moderate valvular aortic stenosis (ADALBERTO: 1.1 cm2, Peak.3 m/sec, DI:0.3, mean   gradient: 18.1 mmHg).   5. Right ventricle systolic pressure estimate normal     CT chest without contrast  IMPRESSION:   1.  Subtle infiltrate in the right upper lobe and both lower lobes, likely representing an infectious or an inflammatory process. Diffuse thickening of the bronchi. No cavitation, suspicious adenopathy or pleural effusion on either side. Mild   emphysematous changes.   2.  Normal cardiac size. Dense coronary artery and aortic valvular calcifications. No pericardial effusion.   3.  Left adrenal hypodense benign  adenoma, stable.   4.  Demineralization of the visualized bones. Degenerative changes both shoulders and the spine. Right convex thoracic curve. Distal left rotator cuff calcific arthropathy.       PENDING LABS         PROCEDURES ( this hospitalization only)          RECOMMENDATION FOR F/U VISIT   Will need further work-up of anemia  Follow-up for aortic stenosis    DISPOSITION     Home    SUMMARY OF HOSPITAL COURSE:    72 year old female with past medical history of COPD, HTN, HLD, afib on warfarin, moderate aortic stenosis, history of HFpEF who was admitted on 3/26/2022 for acute on chronic hypoxic respiratory failure and acute hypercapnic respiratory failure from COPD acute exacerbation requiring BiPAP, pneumonia. Hospital course is notable for utilizing BiPAP.  Later on was weaned off of the BiPAP. remained on baseline oxygen 4 L at home.  He was treated with nebs, IV Solu-Medrol, ceftriaxone, azithromycin. She is feeling much improved.  She is discharging on prednisone taper, cefdinir.    Discharge Medications with Med changes:        Review of your medicines      START taking      Dose / Directions   cefdinir 300 MG capsule  Commonly known as: OMNICEF      Dose: 300 mg  Take 1 capsule (300 mg) by mouth 2 times daily for 4 days  Quantity: 8 capsule  Refills: 0     ipratropium - albuterol 0.5 mg/2.5 mg/3 mL 0.5-2.5 (3) MG/3ML neb solution  Commonly known as: DUONEB      Dose: 3 mL  Take 1 vial (3 mLs) by nebulization every 4 hours as needed for shortness of breath / dyspnea or wheezing  Quantity: 90 mL  Refills: 0     pantoprazole 40 MG EC tablet  Commonly known as: PROTONIX      Dose: 40 mg  Take 1 tablet (40 mg) by mouth every morning (before breakfast) for 10 days  Quantity: 10 tablet  Refills: 0     predniSONE 20 MG tablet  Commonly known as: DELTASONE      Dose: 40 mg  Take 2 tablets (40 mg) by mouth daily 40mg 1 day, 30mg 2 days, 20mg 2 days, 10mg 2 days.  Quantity: 8 tablet  Refills: 0        CONTINUE  "these medicines which may have CHANGED, or have new prescriptions. If we are uncertain of the size of tablets/capsules you have at home, strength may be listed as something that might have changed.      Dose / Directions   Symbicort 160-4.5 MCG/ACT Inhaler  This may have changed: See the new instructions.  Used for: Pulmonary emphysema, unspecified emphysema type (H)  Generic drug: budesonide-formoterol      Dose: 2 puff  Inhale 2 puffs into the lungs 2 times daily  Quantity: 10.2 g  Refills: 3     warfarin ANTICOAGULANT 5 MG tablet  Commonly known as: COUMADIN  This may have changed:   how much to take  how to take this  when to take this  additional instructions  Notes to patient: Follow previous dosing instructions      Take as directed. If you are unsure how to take this medication, talk to your nurse or doctor.  Original instructions: TAKE 1/2 TO 1 TABLET BY MOUTH DAILY AS DIRECTED. ADJUST DOSE PER INR RESULTS  Quantity: 90 tablet  Refills: 1        CONTINUE these medicines which have NOT CHANGED      Dose / Directions   Adult Brief Large Misc  Used for: Mixed stress and urge urinary incontinence      [DIAPER,BRIEF,ADULT,DISPOSABLE (ADULT BRIEFS - LARGE) MISC] Use 3-4 daily as needed for incontinence  Quantity: 120 each  Refills: 6     albuterol 108 (90 Base) MCG/ACT inhaler  Commonly known as: PROAIR HFA/PROVENTIL HFA/VENTOLIN HFA  Used for: Chronic obstructive pulmonary disease with acute lower respiratory infection (H)      [ALBUTEROL (PROAIR HFA;PROVENTIL HFA;VENTOLIN HFA) 90 MCG/ACTUATION INHALER] INHALE 2 PUFFS EVERY 4 HOURS AS NEEDED WHEEZING  Quantity: 90 g  Refills: 11     atorvastatin 20 MG tablet  Commonly known as: LIPITOR  Used for: Mixed hyperlipidemia      TAKE 1 TABLET(20 MG) BY MOUTH AT BEDTIME  Quantity: 90 tablet  Refills: 2     B-D U/F 31G X 8 MM miscellaneous  Used for: DM (diabetes mellitus) (H)  Generic drug: insulin pen needle      [BD ULTRA-FINE SHORT PEN NEEDLE 31 GAUGE X 5/16\" NDLE] " TEST FOUR TIMES DAILY WITH MEALS AND AT BEDTIME  Quantity: 400 each  Refills: 3     blood glucose monitoring lancets  Used for: Type 2 diabetes mellitus with hyperglycemia (H)      [ACCU-CHEK SOFTCLIX LANCETS LANCETS] TEST THREE TIMES DAILY  Quantity: 300 each  Refills: 3     cyclobenzaprine 10 MG tablet  Commonly known as: FLEXERIL  Used for: Fibromyalgia      Dose: 10 mg  Take 1 tablet (10 mg) by mouth nightly as needed for muscle spasms  Quantity: 30 tablet  Refills: 0     diltiazem ER COATED BEADS 120 MG 24 hr capsule  Commonly known as: CARDIZEM CD/CARTIA XT  Used for: Atrial flutter, unspecified type (H)      TAKE 1 CAPSULE(120 MG) BY MOUTH DAILY  Quantity: 90 capsule  Refills: 0     furosemide 20 MG tablet  Commonly known as: LASIX  Used for: Localized edema      Dose: 20 mg  Take 1 tablet (20 mg) by mouth daily as needed  Quantity: 90 tablet  Refills: 3     gabapentin 600 MG tablet  Commonly known as: NEURONTIN  Used for: Type 2 diabetes mellitus with hypoglycemia without coma, with long-term current use of insulin (H)      Dose: 600 mg  Take 1 tablet (600 mg) by mouth 3 times daily  Quantity: 90 tablet  Refills: 4     * GLUCOSE METER TEST VI  Used for: Type 2 diabetes mellitus with hypoglycemia without coma, without long-term current use of insulin (H)      [BLOOD-GLUCOSE METER (ONETOUCH VERIO IQ METER) MISC] Check blood sugar three times a day.  Quantity: 1 each  Refills: 0     * ONETOUCH VERIO IQ test strip  Used for: Type 2 diabetes mellitus with hypoglycemia without coma, without long-term current use of insulin (H)  Generic drug: blood glucose      USE 1 EACH AS DIRECTED THREE TIMES DAILY AS NEEDED  Quantity: 300 strip  Refills: 3     guaiFENesin-codeine 100-10 MG/5ML solution  Commonly known as: ROBITUSSIN AC  Used for: Acute bronchitis, unspecified organism      Dose: 1-2 teaspoonful  Take 5-10 mLs by mouth every 4 hours as needed for cough  Quantity: 180 mL  Refills: 0     meclizine 25 MG  tablet  Commonly known as: ANTIVERT  Used for: Vertigo      Dose: 25 mg  Take 1 tablet (25 mg) by mouth 3 times daily as needed for dizziness or nausea  Quantity: 45 tablet  Refills: 5     metFORMIN 500 MG tablet  Commonly known as: GLUCOPHAGE  Used for: Type 2 diabetes mellitus (H)      TAKE 1 TABLET(500 MG) BY MOUTH TWICE DAILY WITH MEALS  Quantity: 180 tablet  Refills: 0     Mycostatin 665932 UNIT/GM Powd  Used for: Candidal intertrigo      Dose: 1 Application  [NYSTATIN (NYSTOP) POWDER] Apply 1 application topically 2 (two) times a day as needed. 2-3 times to affected area(s).  Quantity: 60 g  Refills: 3     naloxone 4 MG/0.1ML nasal spray  Commonly known as: NARCAN  Used for: Trigger point of thoracic region, Chronic pain syndrome      Dose: 4 mg  Spray 1 spray (4 mg) into one nostril alternating nostrils once as needed for opioid reversal every 2-3 minutes until assistance arrives  Quantity: 0.2 mL  Refills: 0     omeprazole 20 MG DR capsule  Commonly known as: priLOSEC  Used for: Peptic ulcer      TAKE 1 CAPSULE(20 MG) BY MOUTH DAILY BEFORE BREAKFAST  Quantity: 30 capsule  Refills: 3     oxyCODONE IR 10 MG tablet  Commonly known as: ROXICODONE  Used for: Fibromyalgia, Chronic pain syndrome      Dose: 10 mg  Take 1 tablet (10 mg) by mouth every 6 hours as needed for moderate to severe pain  Quantity: 120 tablet  Refills: 0     sucralfate 1 GM tablet  Commonly known as: CARAFATE  Used for: Iron deficiency anemia due to chronic blood loss      TAKE 1 TABLET BY MOUTH FOUR TIMES DAILY(CRUSH TABLET AND MIX IT WITH A LITTLE WATER, THEN SWALLOW)  Quantity: 360 tablet  Refills: 3     UNABLE TO FIND  Used for: Type 2 diabetes mellitus with hyperglycemia, unspecified whether long term insulin use (H)      [GENERIC LANCETS (FINGERSTIX LANCETS)] Dispense brand per patient's insurance at pharmacy discretion.  Quantity: 300 each  Refills: 0         * This list has 2 medication(s) that are the same as other medications  prescribed for you. Read the directions carefully, and ask your doctor or other care provider to review them with you.               Where to get your medicines      These medications were sent to [id number] - [place], [place] - [address] AT [facility] [address]  [address], [place] [address]    Hours: called pharm.  they do accept faxes Phone: [phone]   cefdinir 300 MG capsule  ipratropium - albuterol 0.5 mg/2.5 mg/3 mL 0.5-2.5 (3) MG/3ML neb solution  pantoprazole 40 MG EC tablet  predniSONE 20 MG tablet  Symbicort 160-4.5 MCG/ACT Inhaler              Rationale for medication changes:    Prednisone taper, Protonix for COPD exacerbation  Cefdinir for pneumonia    Consults       Immunizations given this encounter         Discharge Procedure Orders   Reason for your hospital stay   Order Comments: Copd flare up, pneumonia     Follow-up and recommended labs and tests   Order Comments: Follow up with primary care provider, SAVANA SILVER, within 7 days for hospital follow- up.     Activity   Order Comments: Your activity upon discharge: activity as tolerated     Order Specific Question Answer Comments   Is discharge order? Yes      Nebulizer and Supplies Order for DME - ONLY FOR DME   Order Comments: I, the undersigned, certify that the above prescribed supplies are medically necessary for this patient and is both reasonable and necessary in reference to accepted standards of medical and necessary in reference to accepted standards of medical practice in the treatment of this patient's condition and is not prescribed as a convenience.      Order Specific Question Answer Comments   DME Provider: [facility]    Reminder: Place a separate medication order for the nebulizer solution.    Reminder: Call [facility] at [phone] if nebulizer machine/supplies need to be dispensed by [facility].   "  Nebulizer Type: Nebulizer with Cup/Tubing    Length of Need: Lifetime    Nebulizer Supplies Quantity: As needed for 1 year    The face to face evaluation was performed on: 3/30/2022      Diet   Order Comments: Follow this diet upon discharge: Orders Placed This Encounter      Moderate Consistent Carb (60 g CHO per Meal) Diet       Order Specific Question Answer Comments   Is discharge order? Yes      Examination     Vital Signs in last 24 hours:   Vital signs:  Temp: 98.2  F (36.8  C) Temp src: Oral BP: 127/62 Pulse: 70   Resp: 20 SpO2: 91 % O2 Device: Nasal cannula with humidification Oxygen Delivery: 4 LPM Height: 165.1 cm (5' 5\") Weight: 69.3 kg (152 lb 12.8 oz)  Estimated body mass index is 25.43 kg/m  as calculated from the following:    Height as of this encounter: 1.651 m (5' 5\").    Weight as of this encounter: 69.3 kg (152 lb 12.8 oz).    Pertinent positives on exam:  Lungs: Improved wheezing bilaterally    Please see EMR for more detailed significant labs, imaging, consultant notes etc.  Total time spent on discharge: > 35 minutes    Jillian Agrawal MD   [facility] Hospitalist Service: Ph:[phone]    CC:Cammy Bui      "

## 2022-04-01 ENCOUNTER — DOCUMENTATION ONLY (OUTPATIENT)
Dept: ANTICOAGULATION | Facility: CLINIC | Age: 72
End: 2022-04-01
Payer: COMMERCIAL

## 2022-04-01 ENCOUNTER — TELEPHONE (OUTPATIENT)
Dept: RESPIRATORY THERAPY | Facility: HOSPITAL | Age: 72
End: 2022-04-01
Payer: COMMERCIAL

## 2022-04-01 NOTE — PROGRESS NOTES
ANTICOAGULATION  MANAGEMENT: Discharge Review    Mary Kay Tejada chart reviewed for anticoagulation continuity of care    Hospital Admission on 3/26-31 for pneumonia.    Discharge disposition: Home    Results:    Recent labs: (last 7 days)     03/26/22  1946 03/27/22  0838 03/28/22  0423 03/29/22  0439 03/30/22  0510 03/31/22  0504   INR 3.00* 3.47* 3.07* 2.20* 1.75* 1.89*     Anticoagulation inpatient management:     home regimen continued    Anticoagulation discharge instructions:     Warfarin dosing: home regimen continued   Bridging: No   INR goal change: No      Medication changes affecting anticoagulation: Yes: cefdinir through 4/3    Additional factors affecting anticoagulation: No    Plan     No adjustment to anticoagulation plan needed    Patient not contacted    Anticoagulation Calendar updated inr 4/6 with ov    Norberto Turk RN

## 2022-04-01 NOTE — TELEPHONE ENCOUNTER
COPD Education follow up call:  Left message with call back number should pt have questions or concerns and COPD Action Plan reminder.  Aamir Ross, RT, TTS, Chronic Pulmonary Disease Specialist

## 2022-04-04 ENCOUNTER — TELEPHONE (OUTPATIENT)
Dept: RESPIRATORY THERAPY | Facility: CLINIC | Age: 72
End: 2022-04-04
Payer: COMMERCIAL

## 2022-04-04 NOTE — TELEPHONE ENCOUNTER
Spoke with Mary Kay, she doing well, better than when she was in the hospital, no questions for me to day. no issues getting and/or taking their medications.  Reminded when we will call again and to call before if there is a question.  Luisa Matson, RT, Chronic Pulmonary Disease Specialist

## 2022-04-05 NOTE — PROGRESS NOTES
[facility] Care Coordination Contact      [facility] Six-Month Telephone Assessment    6 month telephone assessment completed on 4/5/22.    ER visits: Yes -  [facility]  Hospitalizations: Yes -  [facility]  TCU stays: No  Significant health status changes: No  Falls/Injuries: No  ADL/IADL changes: No  Changes in services: No    Caregiver Assessment follow up:  N/A    Goals: See POC in chart for goal progress documentation.      Will see member in 6 months for an annual health risk assessment.   Encouraged member to call CC with any questions or concerns in the meantime.     Samantha Alexandra RN, PHN   [facility]  [phone]

## 2022-04-08 ENCOUNTER — TELEPHONE (OUTPATIENT)
Dept: RESPIRATORY THERAPY | Facility: HOSPITAL | Age: 72
End: 2022-04-08
Payer: COMMERCIAL

## 2022-04-12 ENCOUNTER — TELEPHONE (OUTPATIENT)
Dept: RESPIRATORY THERAPY | Facility: HOSPITAL | Age: 72
End: 2022-04-12
Payer: COMMERCIAL

## 2022-04-12 ENCOUNTER — TELEPHONE (OUTPATIENT)
Dept: ANTICOAGULATION | Facility: CLINIC | Age: 72
End: 2022-04-12
Payer: COMMERCIAL

## 2022-04-12 ENCOUNTER — ANTICOAGULATION THERAPY VISIT (OUTPATIENT)
Dept: ANTICOAGULATION | Facility: CLINIC | Age: 72
End: 2022-04-12
Payer: COMMERCIAL

## 2022-04-12 DIAGNOSIS — I48.91 ATRIAL FIBRILLATION, UNSPECIFIED TYPE (H): Primary | ICD-10-CM

## 2022-04-12 NOTE — TELEPHONE ENCOUNTER
ANTICOAGULATION     Mary Kay Tejada is overdue for INR check.      Left message for patient to call and schedule lab appointment as soon as possible. If returning call, please schedule.     Zahira Fang RN

## 2022-04-12 NOTE — TELEPHONE ENCOUNTER
Smoking Cessation Counseling Phone Call    Left message along with my contact information should she have any questions or wish to speak to me regarding smoking cessation.    Aamir Ross, RT, TTS, Chronic Pulmonary Disease Specialist'

## 2022-04-14 ENCOUNTER — TELEPHONE (OUTPATIENT)
Dept: RESPIRATORY THERAPY | Facility: HOSPITAL | Age: 72
End: 2022-04-14
Payer: COMMERCIAL

## 2022-04-14 NOTE — TELEPHONE ENCOUNTER
Spoke with Mary Kay. She is feeling well today. She is able to get and take her medications and is compliant in taking them.  Reviewed COPD Action Plan.  Mary Kay had no problems or questions for me today.    Aamir Ross, RT, TTS, Chronic Pulmonary Disease Specialist

## 2022-04-15 DIAGNOSIS — J44.1 COPD EXACERBATION (H): Primary | ICD-10-CM

## 2022-04-15 RX ORDER — BUDESONIDE AND FORMOTEROL FUMARATE DIHYDRATE 160; 4.5 UG/1; UG/1
2 AEROSOL RESPIRATORY (INHALATION) 2 TIMES DAILY
Qty: 10.2 G | Refills: 3 | Status: SHIPPED | OUTPATIENT
Start: 2022-04-15 | End: 2022-08-06

## 2022-04-25 ENCOUNTER — OFFICE VISIT (OUTPATIENT)
Dept: FAMILY MEDICINE | Facility: CLINIC | Age: 72
End: 2022-04-25
Payer: COMMERCIAL

## 2022-04-25 ENCOUNTER — ANTICOAGULATION THERAPY VISIT (OUTPATIENT)
Dept: ANTICOAGULATION | Facility: CLINIC | Age: 72
End: 2022-04-25

## 2022-04-25 VITALS
BODY MASS INDEX: 25.46 KG/M2 | OXYGEN SATURATION: 92 % | SYSTOLIC BLOOD PRESSURE: 127 MMHG | DIASTOLIC BLOOD PRESSURE: 62 MMHG | WEIGHT: 153 LBS | TEMPERATURE: 97.7 F | HEART RATE: 74 BPM

## 2022-04-25 DIAGNOSIS — A49.1 STREPTOCOCCUS PNEUMONIAE INFECTION: ICD-10-CM

## 2022-04-25 DIAGNOSIS — I48.91 ATRIAL FIBRILLATION (H): Primary | ICD-10-CM

## 2022-04-25 DIAGNOSIS — I48.91 ATRIAL FIBRILLATION, UNSPECIFIED TYPE (H): ICD-10-CM

## 2022-04-25 DIAGNOSIS — E11.42 DIABETIC POLYNEUROPATHY ASSOCIATED WITH TYPE 2 DIABETES MELLITUS (H): ICD-10-CM

## 2022-04-25 DIAGNOSIS — J44.1 COPD EXACERBATION (H): ICD-10-CM

## 2022-04-25 DIAGNOSIS — G89.4 CHRONIC PAIN SYNDROME: ICD-10-CM

## 2022-04-25 DIAGNOSIS — M79.7 FIBROMYALGIA: Primary | ICD-10-CM

## 2022-04-25 DIAGNOSIS — M54.6 TRIGGER POINT OF THORACIC REGION: ICD-10-CM

## 2022-04-25 DIAGNOSIS — Z09 HOSPITAL DISCHARGE FOLLOW-UP: ICD-10-CM

## 2022-04-25 DIAGNOSIS — Z12.11 SCREEN FOR COLON CANCER: ICD-10-CM

## 2022-04-25 LAB — INR BLD: 3.2 (ref 0.9–1.1)

## 2022-04-25 PROCEDURE — 36416 COLLJ CAPILLARY BLOOD SPEC: CPT | Performed by: FAMILY MEDICINE

## 2022-04-25 PROCEDURE — 99214 OFFICE O/P EST MOD 30 MIN: CPT | Mod: 25 | Performed by: FAMILY MEDICINE

## 2022-04-25 PROCEDURE — 20553 NJX 1/MLT TRIGGER POINTS 3/>: CPT | Performed by: FAMILY MEDICINE

## 2022-04-25 PROCEDURE — 85610 PROTHROMBIN TIME: CPT | Performed by: FAMILY MEDICINE

## 2022-04-25 RX ORDER — OXYCODONE HYDROCHLORIDE 10 MG/1
10 TABLET ORAL EVERY 6 HOURS PRN
Qty: 120 TABLET | Refills: 0 | Status: SHIPPED | OUTPATIENT
Start: 2022-04-25 | End: 2022-05-25

## 2022-04-25 NOTE — PROGRESS NOTES
Assessment & Plan    1. Fibromyalgia  2. Trigger point of thoracic region  Patient uses combination of trigger point injections and Oxycodone therapy for chronic pain related to fibromyalgia.  Will refill meds.  Trigger point injections done today - total of 6 trigger points injected, total of 1.6 ml each of Lidocaine 1%, no epi.       - oxyCODONE IR (ROXICODONE) 10 MG tablet; Take 1 tablet (10 mg) by mouth every 6 hours as needed for moderate to severe pain  Dispense: 120 tablet; Refill: 0      [facility]    Procedure: MSK: Inject Trigger Point, 1 or 2    Date/Time: 4/25/2022 11:28 AM  Performed by: Cammy Bui MD  Authorized by: Cammy Bui MD       UNIVERSAL PROTOCOL   Site Marked: Yes  Prior Images Obtained and Reviewed:  No  Required items: Required blood products, implants, devices and special equipment available (na)    Patient identity confirmed:  Verbally with patient  NA - No sedation, light sedation, or local anesthesia  Confirmation Checklist:  Procedure was appropriate and matched the consent or emergent situation and patient's identity using two indicators  Time out: Immediately prior to the procedure a time out was called    Universal Protocol: the Joint Commission Universal Protocol was followed    Preparation: Patient was prepped and draped in usual sterile fashion    ESBL (mL):  0     ANESTHESIA    Anesthesia: Local infiltration  Local Anesthetic:  Lidocaine 1% without epinephrine  Anesthetic Total (mL):  10      SEDATION    Patient Sedated: No      PROCEDURE    Length of time physician/provider present for 1:1 monitoring during sedation: 0      3. Hospital discharge follow-up  4. Streptococcus pneumoniae infection  5. COPD exacerbation (H)  I have reviewed available hospital records, consults, notes, discharge summary as well as imaging and lab results.  In addition I have reviewed her medication list and made any adjustments as  "indicated in orders.      Patient was recently hospitalized for acute shortness of breath/wheezing - was thought to have COPD exacerbation with underlying strep pneumonia.  She notes \"full recovery\" - feels much better at this time    6. Atrial fibrillation, unspecified type (H)  Patient has standing INR orders - on chronic warfarin therapy - will have INR team address this  - INR point of care    7. Diabetic polyneuropathy associated with type 2 diabetes mellitus (H)  Patient has chronic pain related to neuropathy secondary to Type II DM     8. Screen for colon cancer  Discussed colon cancer screening - she is agreeable to Cologuard - I am cautious about this choice, but she is unwilling to do colonoscopy.  Agrees to at least this level of screening.    - COLOGUAYO([employer])    9. Chronic pain syndrome  As above -   - oxyCODONE IR (ROXICODONE) 10 MG tablet; Take 1 tablet (10 mg) by mouth every 6 hours as needed for moderate to severe pain  Dispense: 120 tablet; Refill: 0      No follow-ups on file.    Chief Complaint   Patient presents with     Imm/Inj      HPI  Last filled 3/22/2022    Was in hospital   Had \"pneumonia\" (COPD exacerbation)  Feeling much better now  + baseline short of breath           Patient Active Problem List   Diagnosis     Abnormal Weight Loss     Osteoarthritis Of The Shoulder     Chronic Constipation     Onychomycosis Of The Toenails     Diarrhea     Ganglion Of The Left Wrist     Larynx Edema     Obesity     Medial Epicondylitis     Skin Lesion     Urinary Incontinence     Chronic Major Depression     Dry Eye Syndrome     Nicotine Dependence     Hypokalemia     Essential hypertension     Muscle Cramps In The Calf     Edema     Atrial flutter (H)     Lump In / On The Skin     Chronic pain syndrome     Paroxysmal Atrial Fibrillation     Fibromyalgia     Nausea     Abdominal Pain     Peptic Ulcer     Mixed hyperlipidemia     Chronic Reflux Esophagitis     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     Vertigo     Rotator cuff tendonitis     Generalized osteoarthrosis, involving multiple sites     Tendonitis of wrist, left     Trigger point of thoracic region     Flashers or floaters of right eye     Menopause     Tendonitis of wrist, right     Skin lesion of face     Hematoma of abdominal wall     Headache     Cervical neck pain with evidence of disc disease     Controlled substance agreement signed     Aspiration pneumonia (H)     Type 2 diabetes mellitus with hypoglycemia (H)     Candidal intertrigo     Osteoarthritis, hip, bilateral     Primary osteoarthritis of left knee     Osteoarthritis of both knees     Syncope     Opacity of lung on imaging study     Acute gastritis     Gastroenteritis     Nonrheumatic aortic valve stenosis     Bunion, left     Callus of foot     Cataract     Chronic anemia     Anemia due to blood loss, acute     Diabetic polyneuropathy associated with type 2 diabetes mellitus (H)     Upper GI bleed     Melena     Dyslipidemia     Skin lesion     Mixed stress and urge urinary incontinence     Primary osteoarthritis of both knees     Advanced directives, counseling/discussion     Chronic gastric ulcer without hemorrhage and without perforation     Atrial fibrillation, unspecified type (H)     Nicotine dependence     COPD exacerbation (H)     Acute on chronic respiratory failure with hypoxia (H)     Pneumonia of right lower lobe due to infectious organism     Dyspnea, unspecified type     Acute and chronic respiratory failure with hypercapnia (H)        Past Medical History:   Diagnosis Date     Anemia      Aortic stenosis      Atrial fibrillation (H)      Atrial flutter (H)      Benign neoplasm of adenomatous polyp     large intestine      Chronic constipation      Chronic pain syndrome      COPD (chronic obstructive pulmonary disease) (H)     Oxygen at night      Dependence on supplemental oxygen     Oxygen at noc, during the day as needed     Depression      Diabetes  "mellitus (H)      Dry eye syndrome      Fibromyalgia      Ganglion     left wrist     GERD (gastroesophageal reflux disease)      Hyperlipidemia      Hypertension      Hypokalemia      Infective otitis externa, unspecified     Created by Conversion      Larynx edema      Lung disease      Malignant neoplasm of vulva (H)     Created by Conversion [facility] Annotation: Apr 17 2007  8:24AM - Cammy Bui:  resection per Dr. Alfonso Mane 9/06;  Replacement Utility updated for latest IMO load     Medial epicondylitis      Onychomycosis      Osteoarthritis      Peptic ulcer      Polyneuropathy      Vulvar malignant neoplasm (H)         Current Outpatient Medications   Medication     oxyCODONE IR (ROXICODONE) 10 MG tablet     ACCU-CHEK SOFTCLIX LANCETS lancets     albuterol (PROAIR HFA;PROVENTIL HFA;VENTOLIN HFA) 90 mcg/actuation inhaler     atorvastatin (LIPITOR) 20 MG tablet     BD ULTRA-FINE SHORT PEN NEEDLE 31 gauge x 5/16\" Ndle     blood-glucose meter (ONETOUCH VERIO IQ METER) Misc     budesonide-formoterol (SYMBICORT) 160-4.5 MCG/ACT Inhaler     cyclobenzaprine (FLEXERIL) 10 MG tablet     diaper,brief,adult,disposable (ADULT BRIEFS - LARGE) Misc     diltiazem ER COATED BEADS (CARDIZEM CD/CARTIA XT) 120 MG 24 hr capsule     furosemide (LASIX) 20 MG tablet     gabapentin (NEURONTIN) 600 MG tablet     generic lancets (FINGERSTIX LANCETS)     guaiFENesin-codeine (ROBITUSSIN AC) 100-10 MG/5ML solution     ipratropium - albuterol 0.5 mg/2.5 mg/3 mL (DUONEB) 0.5-2.5 (3) MG/3ML neb solution     meclizine (ANTIVERT) 25 MG tablet     metFORMIN (GLUCOPHAGE) 500 MG tablet     naloxone (NARCAN) 4 MG/0.1ML nasal spray     nystatin (NYSTOP) powder     omeprazole (PRILOSEC) 20 MG DR capsule     ONETOUCH VERIO IQ test strip     predniSONE (DELTASONE) 20 MG tablet     sucralfate (CARAFATE) 1 GM tablet     SYMBICORT 160-4.5 MCG/ACT Inhaler     warfarin ANTICOAGULANT (COUMADIN) 5 MG tablet     No current " facility-administered medications for this visit.        Past Surgical History:   Procedure Laterality Date     BIOPSY BREAST Right      BIOPSY BREAST Right 01/28/2015     BIOPSY BREAST Right 1/28/2015    Procedure: RIGHT BREAST BIOPSY AFTER WIRE LOCALIZATION AT 0940;  Surgeon: Renée Soriano MD;  Location: [facility];  Service:      BIOPSY OF BREAST, INCISIONAL      Description: Incisional Breast Biopsy;  Recorded: 11/13/2007;  Comments: benign     COLONOSCOPY N/A 6/14/2019    Procedure: COLONOSCOPY;  Surgeon: Eduardo Mora MD;  Location: [facility];  Service: Gastroenterology     ESOPHAGOSCOPY, GASTROSCOPY, DUODENOSCOPY (EGD), COMBINED N/A 11/6/2018    Procedure: ESOPHAGOGASTRODUODENOSCOPY;  Surgeon: Lit Fernando MD;  Location: [facility];  Service:      HYSTERECTOMY       JOINT REPLACEMENT Left     TKA     MD ABLATE HEART DYSRHYTHM FOCUS      Description: Catheter Ablation Atrial Fibrillation;  Recorded: 07/31/2012;  Comments: 7/24/12 PVI with Dr. Bansal to all 5 pulm veins and CTI fl ablation line as well.     ZZC SUPRACERV ABD HYSTERECTOMY      Description: Supracervical Hysterectomy;  Proc Date: 01/01/1985;  Comments: some cervix left!; ovaries intact; done for bleeding        Social History     Socioeconomic History     Marital status:      Spouse name: Not on file     Number of children: Not on file     Years of education: Not on file     Highest education level: Not on file   Occupational History     Not on file   Tobacco Use     Smoking status: Current Every Day Smoker     Packs/day: 0.25     Types: Cigarettes     Smokeless tobacco: Never Used     Tobacco comment: seen by TTS inpatient on 3/31/22   Substance and Sexual Activity     Alcohol use: Yes     Comment: Alcoholic Drinks/day: very little     Drug use: No     Sexual activity: Not on file   Other Topics Concern     Not on file   Social History Narrative     Not on file     Social Determinants of  Health     Financial Resource Strain: Not on file   Food Insecurity: Not on file   Transportation Needs: Not on file   Physical Activity: Not on file   Stress: Not on file   Social Connections: Not on file   Intimate Partner Violence: Not on file   Housing Stability: Not on file        Family History   Problem Relation Age of Onset     Heart Failure Mother      Cancer Other         paternal HX-laryngeal      Alcoholism Sister      No Known Problems Daughter      No Known Problems Maternal Grandmother      No Known Problems Maternal Grandfather      No Known Problems Paternal Grandmother      No Known Problems Paternal Grandfather      No Known Problems Maternal Aunt      No Known Problems Paternal Aunt      Alcoholism Sister      Alcoholism Brother      Alcoholism Father      Cancer Paternal Uncle         Gastric-Alcohol     Cancer Paternal Uncle         gastric-Alcohol     Hereditary Breast and Ovarian Cancer Syndrome No family hx of      Breast Cancer No family hx of      Colon Cancer No family hx of      Endometrial Cancer No family hx of      Ovarian Cancer No family hx of         Review of Systems   Constitutional: Negative for chills, fever and unexpected weight change.   Eyes: Negative for visual disturbance.   Respiratory: Positive for shortness of breath (baseline).    Cardiovascular: Negative for chest pain and palpitations.   Gastrointestinal: Negative for abdominal pain.   Endocrine: Negative for polydipsia and polyuria.   Musculoskeletal:        Multiple trigger points     All other systems reviewed and are negative.       /62 (BP Location: Left arm, Patient Position: Sitting, Cuff Size: Adult Small)   Pulse 74   Temp 97.7  F (36.5  C)   Wt 69.4 kg (153 lb)   SpO2 92%   BMI 25.46 kg/m       Physical Exam  Constitutional:       General: She is not in acute distress.     Appearance: She is well-developed.   HENT:      Right Ear: Tympanic membrane and external ear normal.      Left Ear: Tympanic  membrane and external ear normal.      Nose: Nose normal.      Mouth/Throat:      Pharynx: No oropharyngeal exudate.   Eyes:      General:         Right eye: No discharge.         Left eye: No discharge.      Conjunctiva/sclera: Conjunctivae normal.      Pupils: Pupils are equal, round, and reactive to light.   Neck:      Thyroid: No thyromegaly.      Trachea: No tracheal deviation.   Cardiovascular:      Rate and Rhythm: Normal rate and regular rhythm.      Pulses: Normal pulses.      Heart sounds: Normal heart sounds, S1 normal and S2 normal. No murmur heard.    No friction rub. No S3 or S4 sounds.   Pulmonary:      Effort: No respiratory distress.      Breath sounds: Normal breath sounds. No wheezing or rales.   Abdominal:      General: Bowel sounds are normal.      Palpations: Abdomen is soft. There is no mass.      Tenderness: There is no abdominal tenderness.   Musculoskeletal:         General: Normal range of motion.      Cervical back: Neck supple.      Comments: Multiple trigger points - especially lower neck/lumbar muscles   Lymphadenopathy:      Cervical: No cervical adenopathy.   Skin:     General: Skin is warm and dry.      Findings: No rash.   Neurological:      Mental Status: She is alert and oriented to person, place, and time.      Motor: No abnormal muscle tone.      Deep Tendon Reflexes: Reflexes are normal and symmetric.   Psychiatric:         Thought Content: Thought content normal.         Judgment: Judgment normal.            Results:  Results for orders placed or performed in visit on 04/25/22   INR point of care     Status: Abnormal   Result Value Ref Range    INR 3.2 (H) 0.9 - 1.1    Narrative    This test is intended for monitoring Coumadin therapy. Results are not accurate in patients with prolonged INR due to factor deficiency.       Medications at Conclusion of Visit:  Current Outpatient Medications   Medication Sig Dispense Refill     oxyCODONE IR (ROXICODONE) 10 MG tablet Take 1  "tablet (10 mg) by mouth every 6 hours as needed for moderate to severe pain 120 tablet 0     ACCU-CHEK SOFTCLIX LANCETS lancets [ACCU-CHEK SOFTCLIX LANCETS LANCETS] TEST THREE TIMES DAILY 300 each 3     albuterol (PROAIR HFA;PROVENTIL HFA;VENTOLIN HFA) 90 mcg/actuation inhaler [ALBUTEROL (PROAIR HFA;PROVENTIL HFA;VENTOLIN HFA) 90 MCG/ACTUATION INHALER] INHALE 2 PUFFS EVERY 4 HOURS AS NEEDED WHEEZING 90 g 11     atorvastatin (LIPITOR) 20 MG tablet TAKE 1 TABLET(20 MG) BY MOUTH AT BEDTIME 90 tablet 2     BD ULTRA-FINE SHORT PEN NEEDLE 31 gauge x 5/16\" Ndle [BD ULTRA-FINE SHORT PEN NEEDLE 31 GAUGE X 5/16\" NDLE] TEST FOUR TIMES DAILY WITH MEALS AND AT BEDTIME 400 each 3     blood-glucose meter (ONETOUCH VERIO IQ METER) Misc [BLOOD-GLUCOSE METER (ONETOUCH VERIO IQ METER) MISC] Check blood sugar three times a day. 1 each 0     budesonide-formoterol (SYMBICORT) 160-4.5 MCG/ACT Inhaler Inhale 2 puffs into the lungs 2 times daily 10.2 g 3     cyclobenzaprine (FLEXERIL) 10 MG tablet Take 1 tablet (10 mg) by mouth nightly as needed for muscle spasms 30 tablet 0     diaper,brief,adult,disposable (ADULT BRIEFS - LARGE) Misc [DIAPER,BRIEF,ADULT,DISPOSABLE (ADULT BRIEFS - LARGE) MISC] Use 3-4 daily as needed for incontinence 120 each 6     diltiazem ER COATED BEADS (CARDIZEM CD/CARTIA XT) 120 MG 24 hr capsule TAKE 1 CAPSULE(120 MG) BY MOUTH DAILY 90 capsule 0     furosemide (LASIX) 20 MG tablet Take 1 tablet (20 mg) by mouth daily as needed 90 tablet 3     gabapentin (NEURONTIN) 600 MG tablet Take 1 tablet (600 mg) by mouth 3 times daily 90 tablet 4     generic lancets (FINGERSTIX LANCETS) [GENERIC LANCETS (FINGERSTIX LANCETS)] Dispense brand per patient's insurance at pharmacy discretion. 300 each 0     guaiFENesin-codeine (ROBITUSSIN AC) 100-10 MG/5ML solution Take 5-10 mLs by mouth every 4 hours as needed for cough 180 mL 0     ipratropium - albuterol 0.5 mg/2.5 mg/3 mL (DUONEB) 0.5-2.5 (3) MG/3ML neb solution Take 1 vial (3 " mLs) by nebulization every 4 hours as needed for shortness of breath / dyspnea or wheezing 90 mL 0     meclizine (ANTIVERT) 25 MG tablet Take 1 tablet (25 mg) by mouth 3 times daily as needed for dizziness or nausea 45 tablet 5     metFORMIN (GLUCOPHAGE) 500 MG tablet TAKE 1 TABLET(500 MG) BY MOUTH TWICE DAILY WITH MEALS 180 tablet 0     naloxone (NARCAN) 4 MG/0.1ML nasal spray Spray 1 spray (4 mg) into one nostril alternating nostrils once as needed for opioid reversal every 2-3 minutes until assistance arrives 0.2 mL 0     nystatin (NYSTOP) powder [NYSTATIN (NYSTOP) POWDER] Apply 1 application topically 2 (two) times a day as needed. 2-3 times to affected area(s). 60 g 3     omeprazole (PRILOSEC) 20 MG DR capsule TAKE 1 CAPSULE(20 MG) BY MOUTH DAILY BEFORE BREAKFAST 30 capsule 3     ONETOUCH VERIO IQ test strip USE 1 EACH AS DIRECTED THREE TIMES DAILY AS NEEDED 300 strip 3     predniSONE (DELTASONE) 20 MG tablet Take 2 tablets (40 mg) by mouth daily 40mg 1 day, 30mg 2 days, 20mg 2 days, 10mg 2 days. 8 tablet 0     sucralfate (CARAFATE) 1 GM tablet TAKE 1 TABLET BY MOUTH FOUR TIMES DAILY(CRUSH TABLET AND MIX IT WITH A LITTLE WATER, THEN SWALLOW) 360 tablet 3     SYMBICORT 160-4.5 MCG/ACT Inhaler Inhale 2 puffs into the lungs 2 times daily 10.2 g 3     warfarin ANTICOAGULANT (COUMADIN) 5 MG tablet TAKE 1/2 TO 1 TABLET BY MOUTH DAILY AS DIRECTED. ADJUST DOSE PER INR RESULTS (Patient taking differently: Take 2.5-5 mg by mouth See Admin Instructions 2.5mg Saturday; 5mg ROW) 90 tablet 1         SAVANA SILVER MD

## 2022-04-25 NOTE — PROGRESS NOTES
ANTICOAGULATION MANAGEMENT     Mary Kay Tejada 72 year old female is on warfarin with supratherapeutic INR result. (Goal INR 2.0-3.0)    Recent labs: (last 7 days)     04/25/22  1106   INR 3.2*       ASSESSMENT     Source(s): Chart Review and Template     Warfarin doses taken: Warfarin taken as instructed  Diet: No new diet changes identified  New illness, injury, or hospitalization: No  Medication/supplement changes: None noted  Signs or symptoms of bleeding or clotting: No  Previous INR: Subtherapeutic  Additional findings: None       PLAN     Recommended plan for no diet, medication or health factor changes affecting INR     Dosing Instructions: partial hold then continue your current warfarin dose with next INR in 2 weeks       Summary  As of 4/25/2022    Full warfarin instructions:  4/25: 2.5 mg; Otherwise 2.5 mg every Sat; 5 mg all other days   Next INR check:  5/9/2022             Detailed voice message left for Mary Kay with dosing instructions and follow up date.     Contact [phone] to schedule and with any changes, questions or concerns.     Education provided: Please call back if any changes to your diet, medications or how you've been taking warfarin    Plan made per [facility] anticoagulation protocol    Norberto Turk RN  Anticoagulation Clinic  4/25/2022    _______________________________________________________________________     Anticoagulation Episode Summary     Current INR goal:  2.0-3.0   TTR:  52.1 % (11.2 mo)   Target end date:  Indefinite   Send INR reminders to:  [facility]    Indications    Paroxysmal Atrial Fibrillation [I48.91]  Atrial fibrillation  unspecified type (H) [I48.91]           Comments:           Anticoagulation Care Providers     Provider Role Specialty Phone number    Cammy Bui MD Referring Family Medicine [phone]

## 2022-04-28 ENCOUNTER — TELEPHONE (OUTPATIENT)
Dept: RESPIRATORY THERAPY | Facility: HOSPITAL | Age: 72
End: 2022-04-28
Payer: COMMERCIAL

## 2022-05-01 ASSESSMENT — ENCOUNTER SYMPTOMS
PALPITATIONS: 0
SHORTNESS OF BREATH: 1
POLYDIPSIA: 0
CHILLS: 0
ABDOMINAL PAIN: 0
UNEXPECTED WEIGHT CHANGE: 0
FEVER: 0

## 2022-05-16 ENCOUNTER — TELEPHONE (OUTPATIENT)
Dept: ANTICOAGULATION | Facility: CLINIC | Age: 72
End: 2022-05-16
Payer: COMMERCIAL

## 2022-05-16 DIAGNOSIS — E11.649 TYPE 2 DIABETES MELLITUS WITH HYPOGLYCEMIA WITHOUT COMA, WITH LONG-TERM CURRENT USE OF INSULIN (H): ICD-10-CM

## 2022-05-16 DIAGNOSIS — Z79.4 TYPE 2 DIABETES MELLITUS WITH HYPOGLYCEMIA WITHOUT COMA, WITH LONG-TERM CURRENT USE OF INSULIN (H): ICD-10-CM

## 2022-05-16 NOTE — TELEPHONE ENCOUNTER
ANTICOAGULATION     Mary Kay Tejada is overdue for INR check.      Left message for patient to call and schedule lab appointment as soon as possible. If returning call, please schedule.     Norberto Turk RN

## 2022-05-17 RX ORDER — GABAPENTIN 600 MG/1
TABLET ORAL
Qty: 90 TABLET | Refills: 4 | Status: SHIPPED | OUTPATIENT
Start: 2022-05-17 | End: 2022-10-07

## 2022-05-17 NOTE — TELEPHONE ENCOUNTER
Routing refill request to provider for review/approval because:  Drug not on the [facility] refill protocol     Last Written Prescription Date:  11/22/21  Last Fill Quantity: 90,  # refills: 4   Last office visit provider:  4/25/22     Requested Prescriptions   Pending Prescriptions Disp Refills     gabapentin (NEURONTIN) 600 MG tablet [Pharmacy Med Name: GABAPENTIN 600MG TABLETS] 90 tablet 4     Sig: TAKE 1 TABLET(600 MG) BY MOUTH THREE TIMES DAILY       There is no refill protocol information for this order          Quincy Dang RN 05/17/22 10:52 AM

## 2022-05-19 DIAGNOSIS — J44.0 CHRONIC OBSTRUCTIVE PULMONARY DISEASE WITH ACUTE LOWER RESPIRATORY INFECTION (H): ICD-10-CM

## 2022-05-20 RX ORDER — ALBUTEROL SULFATE 90 UG/1
AEROSOL, METERED RESPIRATORY (INHALATION)
Qty: 87.1 G | Refills: 1 | Status: SHIPPED | OUTPATIENT
Start: 2022-05-20 | End: 2023-06-07

## 2022-05-20 NOTE — TELEPHONE ENCOUNTER
"Routing refill request to provider for review/approval because:  Please clarify if patient is really using that much inhaler. Far outside [place] protocols for pool RN.    Last Written Prescription Date:  5/3/21  Last Fill Quantity: 90 g,  # refills: 11   Last office visit provider:  4/25/22     Requested Prescriptions   Pending Prescriptions Disp Refills     albuterol (PROAIR HFA/PROVENTIL HFA/VENTOLIN HFA) 108 (90 Base) MCG/ACT inhaler [Pharmacy Med Name: ALBUTEROL HFA INH (200 PUFFS) 6.7GM] 87.1 g      Sig: INHALE 2 PUFFS BY MOUTH EVERY 4 HOURS AS NEEDED FOR WHEEZING       Asthma Maintenance Inhalers - Anticholinergics Passed - 5/20/2022  2:21 PM        Passed - Patient is age 12 years or older        Passed - Recent (12 mo) or future (30 days) visit within the authorizing provider's specialty     Patient has had an office visit with the authorizing provider or a provider within the authorizing providers department within the previous 12 mos or has a future within next 30 days. See \"Patient Info\" tab in inbasket, or \"Choose Columns\" in Meds & Orders section of the refill encounter.              Passed - Medication is active on med list       Short-Acting Beta Agonist Inhalers Protocol  Passed - 5/20/2022  2:21 PM        Passed - Patient is age 12 or older        Passed - Recent (12 mo) or future (30 days) visit within the authorizing provider's specialty     Patient has had an office visit with the authorizing provider or a provider within the authorizing providers department within the previous 12 mos or has a future within next 30 days. See \"Patient Info\" tab in inbasket, or \"Choose Columns\" in Meds & Orders section of the refill encounter.              Passed - Medication is active on med list             Quincy Dang RN 05/20/22 2:24 PM  "

## 2022-05-23 DIAGNOSIS — I48.92 ATRIAL FLUTTER, UNSPECIFIED TYPE (H): ICD-10-CM

## 2022-05-23 DIAGNOSIS — K27.9 PEPTIC ULCER: ICD-10-CM

## 2022-05-24 DIAGNOSIS — I35.0 NONRHEUMATIC AORTIC VALVE STENOSIS: ICD-10-CM

## 2022-05-24 DIAGNOSIS — I48.92 ATRIAL FLUTTER, UNSPECIFIED TYPE (H): Primary | ICD-10-CM

## 2022-05-24 RX ORDER — DILTIAZEM HYDROCHLORIDE 120 MG/1
CAPSULE, COATED, EXTENDED RELEASE ORAL
Qty: 90 CAPSULE | Refills: 0 | Status: SHIPPED | OUTPATIENT
Start: 2022-05-24 | End: 2022-08-23

## 2022-05-24 NOTE — TELEPHONE ENCOUNTER
"Last Written Prescription Date:  12/31/21  Last Fill Quantity: 30,  # refills: 3   Last office visit provider:  4/25/22     Requested Prescriptions   Pending Prescriptions Disp Refills     omeprazole (PRILOSEC) 20 MG DR capsule [Pharmacy Med Name: OMEPRAZOLE 20MG CAPSULES] 30 capsule 3     Sig: TAKE 1 CAPSULE(20 MG) BY MOUTH DAILY BEFORE AND BREAKFAST       PPI Protocol Passed - 5/23/2022  4:22 AM        Passed - Not on Clopidogrel (unless Pantoprazole ordered)        Passed - No diagnosis of osteoporosis on record        Passed - Recent (12 mo) or future (30 days) visit within the authorizing provider's specialty     Patient has had an office visit with the authorizing provider or a provider within the authorizing providers department within the previous 12 mos or has a future within next 30 days. See \"Patient Info\" tab in inbasket, or \"Choose Columns\" in Meds & Orders section of the refill encounter.              Passed - Medication is active on med list        Passed - Patient is age 18 or older        Passed - No active pregnacy on record        Passed - No positive pregnancy test in past 12 months             Daksha Siu, RN 05/23/22 7:13 PM  "

## 2022-05-25 ENCOUNTER — OFFICE VISIT (OUTPATIENT)
Dept: FAMILY MEDICINE | Facility: CLINIC | Age: 72
End: 2022-05-25
Payer: COMMERCIAL

## 2022-05-25 ENCOUNTER — ANTICOAGULATION THERAPY VISIT (OUTPATIENT)
Dept: ANTICOAGULATION | Facility: CLINIC | Age: 72
End: 2022-05-25

## 2022-05-25 VITALS
SYSTOLIC BLOOD PRESSURE: 133 MMHG | WEIGHT: 156.6 LBS | BODY MASS INDEX: 26.06 KG/M2 | DIASTOLIC BLOOD PRESSURE: 65 MMHG | OXYGEN SATURATION: 92 % | HEART RATE: 70 BPM | TEMPERATURE: 97.8 F | RESPIRATION RATE: 21 BRPM

## 2022-05-25 DIAGNOSIS — Z12.11 SCREEN FOR COLON CANCER: ICD-10-CM

## 2022-05-25 DIAGNOSIS — I48.91 ATRIAL FIBRILLATION (H): Primary | ICD-10-CM

## 2022-05-25 DIAGNOSIS — I48.91 ATRIAL FIBRILLATION, UNSPECIFIED TYPE (H): ICD-10-CM

## 2022-05-25 DIAGNOSIS — J20.9 ACUTE BRONCHITIS, UNSPECIFIED ORGANISM: ICD-10-CM

## 2022-05-25 DIAGNOSIS — E11.42 DIABETIC POLYNEUROPATHY ASSOCIATED WITH TYPE 2 DIABETES MELLITUS (H): ICD-10-CM

## 2022-05-25 DIAGNOSIS — M54.6 TRIGGER POINT OF THORACIC REGION: ICD-10-CM

## 2022-05-25 DIAGNOSIS — J44.1 COPD EXACERBATION (H): ICD-10-CM

## 2022-05-25 DIAGNOSIS — G89.4 CHRONIC PAIN SYNDROME: ICD-10-CM

## 2022-05-25 DIAGNOSIS — M79.7 FIBROMYALGIA: Primary | ICD-10-CM

## 2022-05-25 LAB — INR BLD: 3.1 (ref 0.9–1.1)

## 2022-05-25 PROCEDURE — 36415 COLL VENOUS BLD VENIPUNCTURE: CPT | Performed by: FAMILY MEDICINE

## 2022-05-25 PROCEDURE — 99214 OFFICE O/P EST MOD 30 MIN: CPT | Mod: 25 | Performed by: FAMILY MEDICINE

## 2022-05-25 PROCEDURE — 20553 NJX 1/MLT TRIGGER POINTS 3/>: CPT | Performed by: FAMILY MEDICINE

## 2022-05-25 PROCEDURE — 85610 PROTHROMBIN TIME: CPT | Performed by: FAMILY MEDICINE

## 2022-05-25 RX ORDER — CODEINE PHOSPHATE AND GUAIFENESIN 10; 100 MG/5ML; MG/5ML
1-2 SOLUTION ORAL EVERY 4 HOURS PRN
Qty: 180 ML | Refills: 0 | Status: SHIPPED | OUTPATIENT
Start: 2022-05-25 | End: 2022-07-31

## 2022-05-25 RX ORDER — OXYCODONE HYDROCHLORIDE 10 MG/1
10 TABLET ORAL EVERY 6 HOURS PRN
Qty: 120 TABLET | Refills: 0 | Status: SHIPPED | OUTPATIENT
Start: 2022-05-25 | End: 2022-06-24

## 2022-05-25 RX ORDER — PREDNISONE 20 MG/1
40 TABLET ORAL DAILY
Qty: 8 TABLET | Refills: 0 | Status: SHIPPED | OUTPATIENT
Start: 2022-05-25 | End: 2022-12-27

## 2022-05-25 NOTE — PROGRESS NOTES
ANTICOAGULATION MANAGEMENT     Mary Kay Tejada 72 year old female is on warfarin with supratherapeutic INR result. (Goal INR 2.0-3.0)    Recent labs: (last 7 days)     05/25/22  1116   INR 3.1*       ASSESSMENT     Source(s): Chart Review and Patient/Caregiver Call     Warfarin doses taken: Warfarin taken as instructed  Diet: No new diet changes identified  New illness, injury, or hospitalization: No  Medication/supplement changes: amox-clav bid for ten days ends 6/4  Signs or symptoms of bleeding or clotting: No  Previous INR: Supratherapeutic  Additional findings: Mary Kay does not want to decrease her dose, will try fixing with diet, adding one serving of green weekly       PLAN     Recommended plan for no diet, medication or health factor changes affecting INR     Dosing Instructions: continue your current warfarin dose with next INR in 1-2 weeks       Summary  As of 5/25/2022    Full warfarin instructions:  2.5 mg every Sat; 5 mg all other days   Next INR check:  6/8/2022             Telephone call with Mary Kay who verbalizes understanding and agrees to plan    Patient offered & declined to schedule next visit    Education provided: None required    Plan made per [facility] anticoagulation protocol    Norberto Turk RN  Anticoagulation Clinic  5/25/2022    _______________________________________________________________________     Anticoagulation Episode Summary     Current INR goal:  2.0-3.0   TTR:  46.5 % (11.2 mo)   Target end date:  Indefinite   Send INR reminders to:  [facility]    Indications    Paroxysmal Atrial Fibrillation [I48.91]  Atrial fibrillation  unspecified type (H) [I48.91]           Comments:           Anticoagulation Care Providers     Provider Role Specialty Phone number    Cammy Bui MD Referring Family Medicine [phone]

## 2022-05-30 ASSESSMENT — ENCOUNTER SYMPTOMS
ARTHRALGIAS: 1
HEMATOLOGIC/LYMPHATIC NEGATIVE: 1
SHORTNESS OF BREATH: 0
ENDOCRINE NEGATIVE: 1
NECK STIFFNESS: 1
GASTROINTESTINAL NEGATIVE: 1
NEUROLOGICAL NEGATIVE: 1
UNEXPECTED WEIGHT CHANGE: 0
CHILLS: 0
ALLERGIC/IMMUNOLOGIC NEGATIVE: 1
PSYCHIATRIC NEGATIVE: 1
FEVER: 0
NECK PAIN: 1
WHEEZING: 1
COUGH: 1

## 2022-05-31 ENCOUNTER — TELEPHONE (OUTPATIENT)
Dept: RESPIRATORY THERAPY | Facility: HOSPITAL | Age: 72
End: 2022-05-31
Payer: COMMERCIAL

## 2022-05-31 NOTE — PROGRESS NOTES
1. Fibromyalgia  2. Trigger point of thoracic region  Patient benefits from trigger point injections monthly (to reduce need for increased pain medications.   - lidocaine 1 % 10 mL  - oxyCODONE IR (ROXICODONE) 10 MG tablet; Take 1 tablet (10 mg) by mouth every 6 hours as needed for moderate to severe pain  Dispense: 120 tablet; Refill: 0    [facility]    Procedure: MSK: Inject Trigger Points, >3    Date/Time: 5/25/2022 11:45 AM  Performed by: Cammy Bui MD  Authorized by: Cammy Bui MD       UNIVERSAL PROTOCOL   Site Marked: Yes  Prior Images Obtained and Reviewed:  No  Required items: Required blood products, implants, devices and special equipment available (NA)    Patient identity confirmed:  Verbally with patient  NA - No sedation, light sedation, or local anesthesia  Confirmation Checklist:  Procedure was appropriate and matched the consent or emergent situation  Time out: Immediately prior to the procedure a time out was called    Universal Protocol: the Joint Commission Universal Protocol was followed    Preparation: Patient was prepped and draped in usual sterile fashion       ANESTHESIA    Anesthesia: Local infiltration  Local Anesthetic:  Lidocaine 1% without epinephrine  Anesthetic Total (mL):  10      SEDATION    Patient Sedated: No      PROCEDURE  Describe Procedure: Patient with trigger points - related to fibromyalgia - has benefitted from trigger point injections (to reduce use / need for opiate therapy).  Total of 10 ml injected - total of 6 trigger points (3 on right, 3 on left - at upper thoracic areas)  Length of time physician/provider present for 1:1 monitoring during sedation: 0        3. Chronic pain syndrome  Patient has taken Oxycodone regularly , has had GI bleeds on multiple occasions - needs to avoid NSAIDs  - oxyCODONE IR (ROXICODONE) 10 MG tablet; Take 1 tablet (10 mg) by mouth every 6 hours as needed for moderate to  "severe pain  Dispense: 120 tablet; Refill: 0    4. COPD exacerbation (H)  Patient has h/o COPD with occasional exacerbation - had recent hospitalization - feels like she needs something to take as soon as possible when develops symptoms - will prescribe medications to have for acute treatment (patient is aware she should still be seen, but can start medications as soon as possible)  - guaiFENesin-codeine (ROBITUSSIN AC) 100-10 MG/5ML solution; Take 5-10 mLs by mouth every 4 hours as needed for cough  Dispense: 180 mL; Refill: 0  - predniSONE (DELTASONE) 20 MG tablet; Take 2 tablets (40 mg) by mouth daily 40mg 1 day, 30mg 2 days, 20mg 2 days, 10mg 2 days.  Dispense: 8 tablet; Refill: 0  - amoxicillin-clavulanate (AUGMENTIN) 875-125 MG tablet; Take 1 tablet by mouth 2 times daily for 10 days  Dispense: 20 tablet; Refill: 0    5. Diabetic polyneuropathy associated with type 2 diabetes mellitus (H)  H/o diabetes (type II) - reports numbers are \"fine\"    6. Screen for colon cancer  Due for colon cancer screening - patient declines    7. Atrial fibrillation, unspecified type (H)  H/o atrial fib - takes warfarin - INR today   - INR point of care    8. Acute bronchitis, unspecified organism  Patient had recent hospitalization for COPD exacerbation - would like medication for pre-emptive treatment when she first gets sick -   - guaiFENesin-codeine (ROBITUSSIN AC) 100-10 MG/5ML solution; Take 5-10 mLs by mouth every 4 hours as needed for cough  Dispense: 180 mL; Refill: 0      Carol Muñiz is a 72 year old who presents for the following health issues     Here for trigger point injections - shoulders/upper back are really painful, L>R today    Also - desires refill on her Oxycodone - for pain syndrome     Has had recent hospitalization for COPD exacerbation - feels better  But, would like to have medication at home to take at first signs of infection   Knows these are for \"emergency\" use -   Knows as well that she " needs to be seen if she has to use the medications         Review of Systems   Constitutional: Negative for chills, fever and unexpected weight change.   HENT: Negative.    Eyes: Negative for visual disturbance.   Respiratory: Positive for cough (chronic) and wheezing (occasional). Negative for shortness of breath.    Gastrointestinal: Negative.    Endocrine: Negative.    Breasts:  negative.    Genitourinary: Negative.    Musculoskeletal: Positive for arthralgias, neck pain and neck stiffness.   Skin: Negative.    Allergic/Immunologic: Negative.    Neurological: Negative.    Hematological: Negative.    Psychiatric/Behavioral: Negative.    All other systems reviewed and are negative.           Objective    /65 (BP Location: Left arm, Patient Position: Sitting, Cuff Size: Adult Regular)   Pulse 70   Temp 97.8  F (36.6  C) (Temporal)   Resp 21   Wt 71 kg (156 lb 9.6 oz)   SpO2 92%   BMI 26.06 kg/m    Body mass index is 26.06 kg/m .  Physical Exam  Constitutional:       General: She is not in acute distress.     Appearance: She is well-developed.   HENT:      Head: Normocephalic.      Right Ear: Tympanic membrane and external ear normal.      Left Ear: Tympanic membrane and external ear normal.      Nose: Nose normal.      Mouth/Throat:      Pharynx: No oropharyngeal exudate.   Eyes:      General:         Right eye: No discharge.         Left eye: No discharge.      Conjunctiva/sclera: Conjunctivae normal.      Pupils: Pupils are equal, round, and reactive to light.   Neck:      Thyroid: No thyromegaly.      Trachea: No tracheal deviation.   Cardiovascular:      Rate and Rhythm: Normal rate and regular rhythm.      Pulses: Normal pulses.      Heart sounds: Normal heart sounds, S1 normal and S2 normal. No murmur heard.    No friction rub. No S3 or S4 sounds.   Pulmonary:      Effort: Pulmonary effort is normal. No respiratory distress.      Breath sounds: Normal breath sounds. No wheezing or rales.    Abdominal:      General: Bowel sounds are normal.      Palpations: Abdomen is soft. There is no mass.      Tenderness: There is no abdominal tenderness.   Musculoskeletal:         General: Normal range of motion.      Cervical back: Neck supple.      Comments: DJD changes - large and small joints     Lymphadenopathy:      Cervical: No cervical adenopathy.   Skin:     General: Skin is warm and dry.      Findings: No rash.   Neurological:      General: No focal deficit present.      Mental Status: She is alert and oriented to person, place, and time.      Motor: No abnormal muscle tone.      Deep Tendon Reflexes: Reflexes are normal and symmetric.   Psychiatric:         Thought Content: Thought content normal.         Judgment: Judgment normal.            Results for orders placed or performed in visit on 05/25/22   INR point of care     Status: Abnormal   Result Value Ref Range    INR 3.1 (H) 0.9 - 1.1    Narrative    This test is intended for monitoring Coumadin therapy. Results are not accurate in patients with prolonged INR due to factor deficiency.

## 2022-06-15 ENCOUNTER — PATIENT OUTREACH (OUTPATIENT)
Dept: GERIATRIC MEDICINE | Facility: CLINIC | Age: 72
End: 2022-06-15
Payer: COMMERCIAL

## 2022-06-15 NOTE — PROGRESS NOTES
[facility] Care Coordination Contact    Member called CC and reported that [facility] no longer service both the homemaking and PCA services. Member needs new agency for the services. Explained process to member and inquired for MARY Jorge's contact information so CC can provide home care agencies to her.     CC spoke with Cynthia and explained process for her and member to get started with a new home care agency. She would like home care information emailed to her at [email or web address].      CC also received a message from [employer] regarding the above.     Samantha Alexandra RN, PHN   [facility]  [phone]

## 2022-06-20 DIAGNOSIS — E11.9 TYPE 2 DIABETES MELLITUS (H): ICD-10-CM

## 2022-06-24 ENCOUNTER — LAB (OUTPATIENT)
Dept: LAB | Facility: CLINIC | Age: 72
End: 2022-06-24
Payer: COMMERCIAL

## 2022-06-24 ENCOUNTER — OFFICE VISIT (OUTPATIENT)
Dept: FAMILY MEDICINE | Facility: CLINIC | Age: 72
End: 2022-06-24

## 2022-06-24 ENCOUNTER — ANTICOAGULATION THERAPY VISIT (OUTPATIENT)
Dept: ANTICOAGULATION | Facility: CLINIC | Age: 72
End: 2022-06-24

## 2022-06-24 VITALS
SYSTOLIC BLOOD PRESSURE: 116 MMHG | WEIGHT: 151.8 LBS | RESPIRATION RATE: 18 BRPM | BODY MASS INDEX: 25.26 KG/M2 | TEMPERATURE: 97 F | HEART RATE: 73 BPM | DIASTOLIC BLOOD PRESSURE: 58 MMHG

## 2022-06-24 DIAGNOSIS — I48.91 ATRIAL FIBRILLATION, UNSPECIFIED TYPE (H): ICD-10-CM

## 2022-06-24 DIAGNOSIS — M79.7 FIBROMYALGIA: ICD-10-CM

## 2022-06-24 DIAGNOSIS — F33.1 MODERATE EPISODE OF RECURRENT MAJOR DEPRESSIVE DISORDER (H): ICD-10-CM

## 2022-06-24 DIAGNOSIS — I48.91 ATRIAL FIBRILLATION (H): Primary | ICD-10-CM

## 2022-06-24 DIAGNOSIS — G89.4 CHRONIC PAIN SYNDROME: ICD-10-CM

## 2022-06-24 DIAGNOSIS — Z12.11 SCREEN FOR COLON CANCER: ICD-10-CM

## 2022-06-24 DIAGNOSIS — M54.6 TRIGGER POINT OF THORACIC REGION: Primary | ICD-10-CM

## 2022-06-24 DIAGNOSIS — J44.1 COPD EXACERBATION (H): ICD-10-CM

## 2022-06-24 DIAGNOSIS — J96.22 ACUTE AND CHRONIC RESPIRATORY FAILURE WITH HYPERCAPNIA (H): ICD-10-CM

## 2022-06-24 DIAGNOSIS — E11.42 DIABETIC POLYNEUROPATHY ASSOCIATED WITH TYPE 2 DIABETES MELLITUS (H): ICD-10-CM

## 2022-06-24 LAB — INR BLD: 3.3 (ref 0.9–1.1)

## 2022-06-24 PROCEDURE — 85610 PROTHROMBIN TIME: CPT

## 2022-06-24 PROCEDURE — 99213 OFFICE O/P EST LOW 20 MIN: CPT | Mod: 25 | Performed by: FAMILY MEDICINE

## 2022-06-24 PROCEDURE — 36416 COLLJ CAPILLARY BLOOD SPEC: CPT

## 2022-06-24 PROCEDURE — 20553 NJX 1/MLT TRIGGER POINTS 3/>: CPT | Performed by: FAMILY MEDICINE

## 2022-06-24 RX ORDER — OXYCODONE HYDROCHLORIDE 10 MG/1
10 TABLET ORAL EVERY 6 HOURS PRN
Qty: 120 TABLET | Refills: 0 | Status: SHIPPED | OUTPATIENT
Start: 2022-06-24 | End: 2022-07-25

## 2022-06-24 ASSESSMENT — PATIENT HEALTH QUESTIONNAIRE - PHQ9
SUM OF ALL RESPONSES TO PHQ QUESTIONS 1-9: 0
SUM OF ALL RESPONSES TO PHQ QUESTIONS 1-9: 0
10. IF YOU CHECKED OFF ANY PROBLEMS, HOW DIFFICULT HAVE THESE PROBLEMS MADE IT FOR YOU TO DO YOUR WORK, TAKE CARE OF THINGS AT HOME, OR GET ALONG WITH OTHER PEOPLE: SOMEWHAT DIFFICULT

## 2022-06-24 NOTE — PROGRESS NOTES
1. Fibromyalgia  2. Trigger point of thoracic region  This is a 73 yo female here for chronic pain syndrome resulting from fibromyalgia - uses Oxycodone rarely as well as trigger point injections for pain control.  Will refill Oxycodone; trigger point injections for pain control   - oxyCODONE IR (ROXICODONE) 10 MG tablet; Take 1 tablet (10 mg) by mouth every 6 hours as needed for moderate to severe pain  Dispense: 120 tablet; Refill: 0    [facility]    Procedure: MSK: Inject Trigger Points, >3    Date/Time: 6/24/2022 11:10 AM  Performed by: Cammy Bui MD  Authorized by: Cammy Bui MD       UNIVERSAL PROTOCOL   Site Marked: Yes  Prior Images Obtained and Reviewed:  NA  Required items: Required blood products, implants, devices and special equipment available (NA)    Patient identity confirmed:  Verbally with patient  NA - No sedation, light sedation, or local anesthesia  Confirmation Checklist:  Patient's identity using two indicators, correct equipment/implants were available, procedure was appropriate and matched the consent or emergent situation and relevant allergies  Time out: Immediately prior to the procedure a time out was called    Universal Protocol: the Joint Commission Universal Protocol was followed    Preparation: Patient was prepped and draped in usual sterile fashion       ANESTHESIA    Anesthesia: Local infiltration  Local Anesthetic:  Lidocaine 1% without epinephrine  Anesthetic Total (mL):  10      SEDATION    Patient Sedated: No      PROCEDURE  Describe Procedure: 6 trigger points identified in upper thoracic/lower paracervical muscles  These were each injected with 1.6 ml of 1% Lidocaine   Patient Tolerance:  Patient tolerated the procedure well with no immediate complications  Length of time physician/provider present for 1:1 monitoring during sedation: 0      3. COPD exacerbation (H)  Patient has h/o COPD - recurring  exacerbation - discussed importance of control     4. Diabetic polyneuropathy associated with type 2 diabetes mellitus (H)  Due for diabetes follow up will set up labs for next visit    5. Screen for colon cancer  Due for colon cancer screening - discussed    6. Moderate episode of recurrent major depressive disorder (H)  H/o depression -     7. Acute and chronic respiratory failure with hypercapnia (H)  H/o acute respiratory failure - recovered     8. Chronic pain syndrome  As above - fibromyalgia  - oxyCODONE IR (ROXICODONE) 10 MG tablet; Take 1 tablet (10 mg) by mouth every 6 hours as needed for moderate to severe pain  Dispense: 120 tablet; Refill: 0      Subjective   Mary Kay is a 72 year old accompanied by her self., presenting for the following health issues:  Trigger Point Injection      History of Present Illness       Reason for visit:  Inr    She eats 2-3 servings of fruits and vegetables daily.She consumes 3 sweetened beverage(s) daily.She exercises with enough effort to increase her heart rate 10 to 19 minutes per day.  She exercises with enough effort to increase her heart rate 3 or less days per week.   She is taking medications regularly.    Today's PHQ-9         PHQ-9 Total Score: 0    PHQ-9 Q9 Thoughts of better off dead/self-harm past 2 weeks :   Not at all    How difficult have these problems made it for you to do your work, take care of things at home, or get along with other people: Somewhat difficult             Review of Systems   Constitutional: Positive for fatigue. Negative for activity change, chills, fever and unexpected weight change.   Respiratory: Positive for shortness of breath (baseline). Negative for cough and wheezing.    Cardiovascular: Negative for chest pain.   Musculoskeletal: Positive for myalgias.   Neurological: Positive for weakness.   All other systems reviewed and are negative.           Objective    /58 (BP Location: Right arm, Patient Position: Sitting, Cuff  Size: Adult Regular)   Pulse 73   Temp 97  F (36.1  C) (Temporal)   Resp 18   Wt 68.9 kg (151 lb 12.8 oz)   BMI 25.26 kg/m    Body mass index is 25.26 kg/m .  Physical Exam  Constitutional:       General: She is not in acute distress.     Appearance: She is well-developed.   HENT:      Right Ear: Tympanic membrane and external ear normal.      Left Ear: Tympanic membrane and external ear normal.      Nose: Nose normal.      Mouth/Throat:      Pharynx: No oropharyngeal exudate.   Eyes:      General:         Right eye: No discharge.         Left eye: No discharge.      Conjunctiva/sclera: Conjunctivae normal.      Pupils: Pupils are equal, round, and reactive to light.   Neck:      Thyroid: No thyromegaly.      Trachea: No tracheal deviation.   Cardiovascular:      Rate and Rhythm: Normal rate and regular rhythm.      Pulses: Normal pulses.      Heart sounds: Normal heart sounds, S1 normal and S2 normal. No murmur heard.    No friction rub. No S3 or S4 sounds.   Pulmonary:      Effort: Pulmonary effort is normal. No respiratory distress.      Breath sounds: Normal breath sounds. No wheezing or rales.   Abdominal:      General: Bowel sounds are normal.      Palpations: Abdomen is soft. There is no mass.      Tenderness: There is no abdominal tenderness.   Musculoskeletal:         General: Normal range of motion.      Cervical back: Neck supple.      Comments: Tender to palpation in trigger points at upper thoracic/lower cervical   Lymphadenopathy:      Cervical: No cervical adenopathy.   Skin:     General: Skin is warm and dry.      Findings: No rash.   Neurological:      General: No focal deficit present.      Mental Status: She is alert and oriented to person, place, and time.      Motor: No abnormal muscle tone.      Deep Tendon Reflexes: Reflexes are normal and symmetric.   Psychiatric:         Thought Content: Thought content normal.         Judgment: Judgment normal.            Results for orders placed or  performed in visit on 06/24/22   INR point of care     Status: Abnormal   Result Value Ref Range    INR 3.3 (H) 0.9 - 1.1    Narrative    This test is intended for monitoring Coumadin therapy. Results are not accurate in patients with prolonged INR due to factor deficiency.                   .  ..

## 2022-06-24 NOTE — PROGRESS NOTES
ANTICOAGULATION MANAGEMENT     Mary Kay Tejada 72 year old female is on warfarin with supratherapeutic INR result. (Goal INR 2.0-3.0)    Recent labs: (last 7 days)     06/24/22  1023   INR 3.3*       ASSESSMENT     Source(s): Chart Review, Patient/Caregiver Call and Template     Warfarin doses taken: Warfarin taken as instructed  Diet: No new diet changes identified  New illness, injury, or hospitalization: No  Medication/supplement changes: None noted  Signs or symptoms of bleeding or clotting: No  Previous INR: Supratherapeutic  Additional findings: None       PLAN     Recommended plan for no diet, medication or health factor changes affecting INR     Dosing Instructions: partial hold then decrease your warfarin dose (7.7% change) with next INR in 2 weeks       Summary  As of 6/24/2022    Full warfarin instructions:  6/24: 2.5 mg; Otherwise 2.5 mg every Mon, Sat; 5 mg all other days   Next INR check:  7/8/2022             Telephone call with Mary Kay who verbalizes understanding and agrees to plan    Lab visit scheduled    Education provided: None required    Plan made per [facility] anticoagulation protocol    Norberto Turk RN  Anticoagulation Clinic  6/24/2022    _______________________________________________________________________     Anticoagulation Episode Summary     Current INR goal:  2.0-3.0   TTR:  46.5 % (11.2 mo)   Target end date:  Indefinite   Send INR reminders to:  [facility]    Indications    Paroxysmal Atrial Fibrillation [I48.91]  Atrial fibrillation  unspecified type (H) [I48.91]           Comments:           Anticoagulation Care Providers     Provider Role Specialty Phone number    Cammy Bui MD Referring Family Medicine [phone]

## 2022-06-26 ASSESSMENT — ENCOUNTER SYMPTOMS
ACTIVITY CHANGE: 0
FEVER: 0
UNEXPECTED WEIGHT CHANGE: 0
WHEEZING: 0
WEAKNESS: 1
FATIGUE: 1
SHORTNESS OF BREATH: 1
COUGH: 0
CHILLS: 0
MYALGIAS: 1

## 2022-06-30 ENCOUNTER — TELEPHONE (OUTPATIENT)
Dept: RESPIRATORY THERAPY | Facility: CLINIC | Age: 72
End: 2022-06-30

## 2022-06-30 NOTE — TELEPHONE ENCOUNTER
Spoke with Mary Kay, doing well, no questions for me to day. no issues getting and/or taking their medications, reviewed their action plan, in their green zone.  Reminded when we will call again and to call before if there is a question.  Luisa Matson, RT, TTS, Chronic Pulmonary Disease Specialist

## 2022-07-05 ENCOUNTER — HOSPITAL ENCOUNTER (OUTPATIENT)
Dept: CARDIOLOGY | Facility: CLINIC | Age: 72
Discharge: HOME | End: 2022-07-05
Attending: INTERNAL MEDICINE | Admitting: INTERNAL MEDICINE
Payer: COMMERCIAL

## 2022-07-05 DIAGNOSIS — I48.92 ATRIAL FLUTTER, UNSPECIFIED TYPE (H): ICD-10-CM

## 2022-07-05 DIAGNOSIS — I35.0 NONRHEUMATIC AORTIC VALVE STENOSIS: ICD-10-CM

## 2022-07-05 LAB — LVEF ECHO: NORMAL

## 2022-07-05 PROCEDURE — 93306 TTE W/DOPPLER COMPLETE: CPT

## 2022-07-05 PROCEDURE — 93306 TTE W/DOPPLER COMPLETE: CPT | Mod: 26 | Performed by: INTERNAL MEDICINE

## 2022-07-15 ENCOUNTER — PATIENT OUTREACH (OUTPATIENT)
Dept: GERIATRIC MEDICINE | Facility: CLINIC | Age: 72
End: 2022-07-15

## 2022-07-15 ENCOUNTER — TELEPHONE (OUTPATIENT)
Dept: ANTICOAGULATION | Facility: CLINIC | Age: 72
End: 2022-07-15

## 2022-07-15 NOTE — TELEPHONE ENCOUNTER
ANTICOAGULATION     Mary Kay Tejada is overdue for INR check.      Spoke with Mary Kay and scheduled lab appointment on 7/25    - she already has transportation lined up for her appt with PCP on 7/25 and would prefer to get it checked at that time.    Pascale Sherwood RN

## 2022-07-15 NOTE — PROGRESS NOTES
[facility] Care Coordination Contact    Received change of agency form from [employer]. Change effective 7/7/22. Called [facility] and spoke with Rex. Inquired start date for PCA/HM. He stated the background and paperwork for PCA completed 7/1/22.     Auth for PCA and homemaking services for [facility] will be effective 7/7/22.    Samantha Alexandra RN, PHN   [facility]  [phone]

## 2022-07-17 ENCOUNTER — HEALTH MAINTENANCE LETTER (OUTPATIENT)
Age: 72
End: 2022-07-17

## 2022-07-18 NOTE — PROGRESS NOTES
[facility] Care Coordination Contact    Member Signature - POC Change:  Per CC, member has made a change to their POC.  Care Plan Change Letter mailed to member for signature with a self-addressed return envelope.    Esha Gerardo  Care Management Specialist  [facility]  [phone]

## 2022-07-25 ENCOUNTER — TELEPHONE (OUTPATIENT)
Dept: FAMILY MEDICINE | Facility: CLINIC | Age: 72
End: 2022-07-25

## 2022-07-25 ENCOUNTER — ANTICOAGULATION THERAPY VISIT (OUTPATIENT)
Dept: ANTICOAGULATION | Facility: CLINIC | Age: 72
End: 2022-07-25

## 2022-07-25 ENCOUNTER — OFFICE VISIT (OUTPATIENT)
Dept: FAMILY MEDICINE | Facility: CLINIC | Age: 72
End: 2022-07-25
Payer: COMMERCIAL

## 2022-07-25 VITALS
OXYGEN SATURATION: 84 % | WEIGHT: 152.6 LBS | HEIGHT: 65 IN | TEMPERATURE: 98.3 F | HEART RATE: 75 BPM | SYSTOLIC BLOOD PRESSURE: 102 MMHG | RESPIRATION RATE: 20 BRPM | DIASTOLIC BLOOD PRESSURE: 62 MMHG | BODY MASS INDEX: 25.43 KG/M2

## 2022-07-25 DIAGNOSIS — E11.42 DIABETIC POLYNEUROPATHY ASSOCIATED WITH TYPE 2 DIABETES MELLITUS (H): Primary | ICD-10-CM

## 2022-07-25 DIAGNOSIS — I48.91 ATRIAL FIBRILLATION, UNSPECIFIED TYPE (H): ICD-10-CM

## 2022-07-25 DIAGNOSIS — K27.9 PEPTIC ULCER: ICD-10-CM

## 2022-07-25 DIAGNOSIS — G89.4 CHRONIC PAIN SYNDROME: ICD-10-CM

## 2022-07-25 DIAGNOSIS — M79.7 FIBROMYALGIA: ICD-10-CM

## 2022-07-25 DIAGNOSIS — I48.91 ATRIAL FIBRILLATION (H): Primary | ICD-10-CM

## 2022-07-25 DIAGNOSIS — E78.2 MIXED HYPERLIPIDEMIA: ICD-10-CM

## 2022-07-25 DIAGNOSIS — Z23 NEED FOR COVID-19 VACCINE: ICD-10-CM

## 2022-07-25 DIAGNOSIS — Z12.11 SCREEN FOR COLON CANCER: ICD-10-CM

## 2022-07-25 LAB
ALBUMIN SERPL BCG-MCNC: 4.2 G/DL (ref 3.5–5.2)
ALP SERPL-CCNC: 93 U/L (ref 35–104)
ALT SERPL W P-5'-P-CCNC: 14 U/L (ref 10–35)
ANION GAP SERPL CALCULATED.3IONS-SCNC: 13 MMOL/L (ref 7–15)
AST SERPL W P-5'-P-CCNC: 24 U/L (ref 10–35)
BILIRUB SERPL-MCNC: 0.4 MG/DL
BUN SERPL-MCNC: 12.5 MG/DL (ref 8–23)
CALCIUM SERPL-MCNC: 9.3 MG/DL (ref 8.8–10.2)
CHLORIDE SERPL-SCNC: 101 MMOL/L (ref 98–107)
CHOLEST SERPL-MCNC: 151 MG/DL
CREAT SERPL-MCNC: 0.51 MG/DL (ref 0.51–0.95)
DEPRECATED HCO3 PLAS-SCNC: 30 MMOL/L (ref 22–29)
ERYTHROCYTE [DISTWIDTH] IN BLOOD BY AUTOMATED COUNT: 17.6 % (ref 10–15)
GFR SERPL CREATININE-BSD FRML MDRD: >90 ML/MIN/1.73M2
GLUCOSE SERPL-MCNC: 92 MG/DL (ref 70–99)
HBA1C MFR BLD: 5.8 % (ref 0–5.6)
HCT VFR BLD AUTO: 32.3 % (ref 35–47)
HDLC SERPL-MCNC: 84 MG/DL
HGB BLD-MCNC: 9 G/DL (ref 11.7–15.7)
INR BLD: 3.7 (ref 0.9–1.1)
LDLC SERPL CALC-MCNC: 53 MG/DL
MCH RBC QN AUTO: 22.2 PG (ref 26.5–33)
MCHC RBC AUTO-ENTMCNC: 27.9 G/DL (ref 31.5–36.5)
MCV RBC AUTO: 80 FL (ref 78–100)
NONHDLC SERPL-MCNC: 67 MG/DL
PLATELET # BLD AUTO: 237 10E3/UL (ref 150–450)
POTASSIUM SERPL-SCNC: 4 MMOL/L (ref 3.4–5.3)
PROT SERPL-MCNC: 7 G/DL (ref 6.4–8.3)
RBC # BLD AUTO: 4.05 10E6/UL (ref 3.8–5.2)
SODIUM SERPL-SCNC: 144 MMOL/L (ref 136–145)
TRIGL SERPL-MCNC: 72 MG/DL
WBC # BLD AUTO: 7.6 10E3/UL (ref 4–11)

## 2022-07-25 PROCEDURE — 85610 PROTHROMBIN TIME: CPT | Performed by: FAMILY MEDICINE

## 2022-07-25 PROCEDURE — 20553 NJX 1/MLT TRIGGER POINTS 3/>: CPT | Performed by: FAMILY MEDICINE

## 2022-07-25 PROCEDURE — 36415 COLL VENOUS BLD VENIPUNCTURE: CPT | Performed by: FAMILY MEDICINE

## 2022-07-25 PROCEDURE — 80053 COMPREHEN METABOLIC PANEL: CPT | Performed by: FAMILY MEDICINE

## 2022-07-25 PROCEDURE — 83036 HEMOGLOBIN GLYCOSYLATED A1C: CPT | Performed by: FAMILY MEDICINE

## 2022-07-25 PROCEDURE — 85027 COMPLETE CBC AUTOMATED: CPT | Performed by: FAMILY MEDICINE

## 2022-07-25 PROCEDURE — 91305 COVID-19,PF,PFIZER (12+ YRS): CPT | Performed by: FAMILY MEDICINE

## 2022-07-25 PROCEDURE — 0054A COVID-19,PF,PFIZER (12+ YRS): CPT | Performed by: FAMILY MEDICINE

## 2022-07-25 PROCEDURE — 80061 LIPID PANEL: CPT | Performed by: FAMILY MEDICINE

## 2022-07-25 PROCEDURE — 99214 OFFICE O/P EST MOD 30 MIN: CPT | Mod: 25 | Performed by: FAMILY MEDICINE

## 2022-07-25 RX ORDER — OXYCODONE HYDROCHLORIDE 10 MG/1
10 TABLET ORAL EVERY 6 HOURS PRN
Qty: 120 TABLET | Refills: 0 | Status: SHIPPED | OUTPATIENT
Start: 2022-07-25 | End: 2022-08-24

## 2022-07-25 RX ORDER — OXYCODONE HYDROCHLORIDE 10 MG/1
10 TABLET ORAL EVERY 6 HOURS PRN
Qty: 120 TABLET | Refills: 0 | Status: SHIPPED | OUTPATIENT
Start: 2022-07-25 | End: 2022-07-25

## 2022-07-25 RX ORDER — OXYCODONE HYDROCHLORIDE 10 MG/1
10 TABLET ORAL EVERY 6 HOURS PRN
Qty: 120 TABLET | Refills: 0 | Status: CANCELLED | OUTPATIENT
Start: 2022-07-25

## 2022-07-25 ASSESSMENT — PATIENT HEALTH QUESTIONNAIRE - PHQ9
SUM OF ALL RESPONSES TO PHQ QUESTIONS 1-9: 1
10. IF YOU CHECKED OFF ANY PROBLEMS, HOW DIFFICULT HAVE THESE PROBLEMS MADE IT FOR YOU TO DO YOUR WORK, TAKE CARE OF THINGS AT HOME, OR GET ALONG WITH OTHER PEOPLE: NOT DIFFICULT AT ALL
SUM OF ALL RESPONSES TO PHQ QUESTIONS 1-9: 1

## 2022-07-25 NOTE — TELEPHONE ENCOUNTER
Reason for Call:  Medication or medication refill:    Do you use a [facility]?  Name of the pharmacy and phone number for the current request:  [facility] in [place]    Name of the medication requested: oxyCODONE IR (ROXICODONE) 10 MG tablet    Other request: Please resend this RX to pharmacy with your electronic signature. Pharmacy received RX but there was no electronic signature so their system deleted it. Please resend RX to pharmacy.    Can we leave a detailed message on this number? YES    Phone number patient can be reached at: Home number on file [phone] (home)    Best Time: any    Call taken on 7/25/2022 at 2:10 PM by Cuba Hernández

## 2022-07-25 NOTE — PROGRESS NOTES
ANTICOAGULATION MANAGEMENT     Mary Kay Tejada 72 year old female is on warfarin with supratherapeutic INR result. (Goal INR 2.0-3.0)    Recent labs: (last 7 days)     07/25/22  1026   INR 3.7*       ASSESSMENT     Source(s): Chart Review and Patient/Caregiver Call     Warfarin doses taken: Warfarin taken as instructed  Diet: No new diet changes identified  New illness, injury, or hospitalization: No  Medication/supplement changes: did have tylenol over the weekend, not currently needed  Signs or symptoms of bleeding or clotting: No  Previous INR: Supratherapeutic  Additional findings: None       PLAN     Recommended plan for no diet, medication or health factor changes affecting INR     Dosing Instructions: hold dose then decrease your warfarin dose (8.3% change) with next INR in 2 weeks       Summary  As of 7/25/2022    Full warfarin instructions:  7/25: Hold; Otherwise 2.5 mg every Mon, Wed, Sat; 5 mg all other days   Next INR check:  8/8/2022             Telephone call with Mary Kay who verbalizes understanding and agrees to plan    Patient offered & declined to schedule next visit    Education provided: None required    Plan made per [facility] anticoagulation protocol    Norberto Turk RN  Anticoagulation Clinic  7/25/2022    _______________________________________________________________________     Anticoagulation Episode Summary     Current INR goal:  2.0-3.0   TTR:  44.5 % (11.2 mo)   Target end date:  Indefinite   Send INR reminders to:  [facility]    Indications    Paroxysmal Atrial Fibrillation [I48.91]  Atrial fibrillation  unspecified type (H) [I48.91]           Comments:           Anticoagulation Care Providers     Provider Role Specialty Phone number    Cammy Bui MD Referring Family Medicine [phone]

## 2022-07-25 NOTE — PROGRESS NOTES
1. Diabetic polyneuropathy associated with type 2 diabetes mellitus (H)  Patient has h/o type II DM - has had good control - due for lab evaluation  - Comprehensive metabolic panel (BMP + Alb, Alk Phos, ALT, AST, Total. Bili, TP); Future  - Hemoglobin A1c; Future  - Comprehensive metabolic panel (BMP + Alb, Alk Phos, ALT, AST, Total. Bili, TP)  - Hemoglobin A1c    2. Atrial fibrillation, unspecified type (H)  H/o atrial fib - on warfarin - chronic - check INR  - INR point of care    3. Peptic ulcer  H/o peptic ulcer with recurring bleeding - check hemogram  - CBC with platelets; Future  - CBC with platelets    4. Mixed hyperlipidemia  Hyperlipidemia - due for screening -   - Comprehensive metabolic panel (BMP + Alb, Alk Phos, ALT, AST, Total. Bili, TP); Future  - Lipid Profile (Chol, Trig, HDL, LDL calc); Future  - Comprehensive metabolic panel (BMP + Alb, Alk Phos, ALT, AST, Total. Bili, TP)  - Lipid Profile (Chol, Trig, HDL, LDL calc)    5. Fibromyalgia  Patient with chronic pain related to fibromyalgia - has benefited from trigger point injection - this allows her to avoid more/increased opiate for pain control (unable to take NSAID due to GI bleeding).    - MSK: Inject Trigger Points, >3    6. Chronic pain syndrome  As above -     7. Screen for colon cancer  Due for colon cancer screening    8. Need for COVID-19 vaccine  Due for COVID-19 vaccine booster    [facility]    Procedure: MSK: Inject Trigger Points, >3    Date/Time: 7/25/2022 11:17 AM  Performed by: Cammy Bui MD  Authorized by: Cammy Bui MD       UNIVERSAL PROTOCOL   Site Marked: Yes  Prior Images Obtained and Reviewed:  NA  Required items: Required blood products, implants, devices and special equipment available    Patient identity confirmed:  Verbally with patient  NA - No sedation, light sedation, or local anesthesia  Confirmation Checklist:  Patient's identity using two  indicators  Time out: Immediately prior to the procedure a time out was called    Universal Protocol: the Joint Commission Universal Protocol was followed    Preparation: Patient was prepped and draped in usual sterile fashion    ESBL (mL):  0    SEDATION    Patient Sedated: No      PROCEDURE  Describe Procedure: Total of 10 ml Lidocaine (no epi) injected in total of 6 trigger points - 4 on left paracervical/suprascapular, 2 on right suprascapular   Patient Tolerance:  Patient tolerated the procedure well with no immediate complications  Patient complications:  Other (see comment)  Length of time physician/provider present for 1:1 monitoring during sedation: 0        Carol Muñiz is a 72 year old, presenting for the following health issues:  trigger point shots       Having pain - more on left neck/upper back -        History of Present Illness       Reason for visit:  Trigger point shots    She eats 0-1 servings of fruits and vegetables daily.She consumes 1 sweetened beverage(s) daily.She exercises with enough effort to increase her heart rate 9 or less minutes per day.  She exercises with enough effort to increase her heart rate 3 or less days per week.   She is taking medications regularly.    Today's PHQ-9         PHQ-9 Total Score: 1    PHQ-9 Q9 Thoughts of better off dead/self-harm past 2 weeks :   Not at all    How difficult have these problems made it for you to do your work, take care of things at home, or get along with other people: Not difficult at all             Review of Systems   Constitutional: Positive for fatigue. Negative for chills, fever and unexpected weight change.   HENT: Negative.    Eyes: Negative for visual disturbance.   Respiratory: Negative for cough and shortness of breath.    Cardiovascular: Negative for chest pain.   Gastrointestinal: Positive for abdominal pain (improved).   Endocrine: Negative for polydipsia and polyuria.   Breasts:  negative.    Genitourinary: Negative.  "   Musculoskeletal: Positive for arthralgias, back pain and myalgias.   Skin: Negative.    Allergic/Immunologic: Negative.    Neurological: Negative.    Hematological: Negative.    Psychiatric/Behavioral: Negative.    All other systems reviewed and are negative.           Objective    /62 (BP Location: Right arm, Patient Position: Sitting, Cuff Size: Adult Small)   Pulse 75   Temp 98.3  F (36.8  C) (Oral)   Resp 20   Ht 1.651 m (5' 5\")   Wt 69.2 kg (152 lb 9.6 oz)   SpO2 (!) 84%   BMI 25.39 kg/m    Body mass index is 25.39 kg/m .  Physical Exam  Constitutional:       General: She is not in acute distress.     Appearance: Normal appearance. She is well-developed. She is ill-appearing (chronically ill appearing).   HENT:      Right Ear: Tympanic membrane and external ear normal.      Left Ear: Tympanic membrane and external ear normal.      Nose: Nose normal.      Mouth/Throat:      Pharynx: No oropharyngeal exudate.   Eyes:      General:         Right eye: No discharge.         Left eye: No discharge.      Conjunctiva/sclera: Conjunctivae normal.      Pupils: Pupils are equal, round, and reactive to light.   Neck:      Thyroid: No thyromegaly.      Trachea: No tracheal deviation.   Cardiovascular:      Rate and Rhythm: Normal rate and regular rhythm.      Pulses: Normal pulses.      Heart sounds: Normal heart sounds, S1 normal and S2 normal. No murmur heard.    No friction rub. No S3 or S4 sounds.   Pulmonary:      Effort: Pulmonary effort is normal. No respiratory distress.      Breath sounds: Normal breath sounds. No wheezing or rales.   Abdominal:      General: Bowel sounds are normal.      Palpations: Abdomen is soft. There is no mass.      Tenderness: There is no abdominal tenderness.   Musculoskeletal:         General: Tenderness (tender to palpation at left neck/suprascapular ) present. Normal range of motion.      Cervical back: Neck supple.   Lymphadenopathy:      Cervical: No cervical " adenopathy.   Skin:     General: Skin is warm and dry.      Findings: No rash.   Neurological:      Mental Status: She is alert and oriented to person, place, and time.      Motor: No abnormal muscle tone.      Deep Tendon Reflexes: Reflexes are normal and symmetric.   Psychiatric:         Thought Content: Thought content normal.         Judgment: Judgment normal.            Results for orders placed or performed in visit on 07/25/22   Comprehensive metabolic panel (BMP + Alb, Alk Phos, ALT, AST, Total. Bili, TP)     Status: Abnormal   Result Value Ref Range    Sodium 144 136 - 145 mmol/L    Potassium 4.0 3.4 - 5.3 mmol/L    Creatinine 0.51 0.51 - 0.95 mg/dL    Urea Nitrogen 12.5 8.0 - 23.0 mg/dL    Chloride 101 98 - 107 mmol/L    Carbon Dioxide (CO2) 30 (H) 22 - 29 mmol/L    Anion Gap 13 7 - 15 mmol/L    Glucose 92 70 - 99 mg/dL    Calcium 9.3 8.8 - 10.2 mg/dL    Protein Total 7.0 6.4 - 8.3 g/dL    Albumin 4.2 3.5 - 5.2 g/dL    Bilirubin Total 0.4 <=1.2 mg/dL    Alkaline Phosphatase 93 35 - 104 U/L    AST 24 10 - 35 U/L    ALT 14 10 - 35 U/L    GFR Estimate >90 >60 mL/min/1.73m2   Hemoglobin A1c     Status: Abnormal   Result Value Ref Range    Hemoglobin A1C 5.8 (H) 0.0 - 5.6 %   Lipid Profile (Chol, Trig, HDL, LDL calc)     Status: Normal   Result Value Ref Range    Cholesterol 151 <200 mg/dL    Triglycerides 72 <150 mg/dL    Direct Measure HDL 84 >=50 mg/dL    LDL Cholesterol Calculated 53 <=100 mg/dL    Non HDL Cholesterol 67 <130 mg/dL    Narrative    Cholesterol  Desirable:  <200 mg/dL    Triglycerides  Normal:  Less than 150 mg/dL  Borderline High:  150-199 mg/dL  High:  200-499 mg/dL  Very High:  Greater than or equal to 500 mg/dL    Direct Measure HDL  Female:  Greater than or equal to 50 mg/dL   Male:  Greater than or equal to 40 mg/dL    LDL Cholesterol  Desirable:  <100mg/dL  Above Desirable:  100-129 mg/dL   Borderline High:  130-159 mg/dL   High:  160-189 mg/dL   Very High:  >= 190 mg/dL    Non HDL  Cholesterol  Desirable:  130 mg/dL  Above Desirable:  130-159 mg/dL  Borderline High:  160-189 mg/dL  High:  190-219 mg/dL  Very High:  Greater than or equal to 220 mg/dL   CBC with platelets     Status: Abnormal   Result Value Ref Range    WBC Count 7.6 4.0 - 11.0 10e3/uL    RBC Count 4.05 3.80 - 5.20 10e6/uL    Hemoglobin 9.0 (L) 11.7 - 15.7 g/dL    Hematocrit 32.3 (L) 35.0 - 47.0 %    MCV 80 78 - 100 fL    MCH 22.2 (L) 26.5 - 33.0 pg    MCHC 27.9 (L) 31.5 - 36.5 g/dL    RDW 17.6 (H) 10.0 - 15.0 %    Platelet Count 237 150 - 450 10e3/uL   INR point of care     Status: Abnormal   Result Value Ref Range    INR 3.7 (H) 0.9 - 1.1    Narrative    This test is intended for monitoring Coumadin therapy. Results are not accurate in patients with prolonged INR due to factor deficiency.                   .  ..

## 2022-07-25 NOTE — TELEPHONE ENCOUNTER
Medication Question or Refill    Contacts       Type Contact Phone/Fax    07/25/2022 01:35 PM CDT Phone (Incoming) Mary Kay Tejada (Self) [phone] (H)          What medication are you calling about (include dose and sig)?: oxyCODONE IR (ROXICODONE) 10 MG tablet    Controlled Substance Agreement on file:   CSA -- Patient Level:    Controlled Substance Agreement - Opioid - Scan on 12/16/2015       Who prescribed the medication?: Dr. Brizuela    Do you need a refill? No    When did you use the medication last? N/A    Patient offered an appointment? No    Do you have any questions or concerns?  Yes: Patient states patient call pharmacy, pharmacy states did not receive prescription. Writer informed patient to call pharmacy again to double check if prescription was received yet and patient verbalizes understanding.    Preferred Pharmacy:   [id number] - [place], [place] - [address] AT [facility] [address]  [place] [address]  Phone: [phone] Fax: [phone]      Could we send this information to you in [facility] or would you prefer to receive a phone call?:   Patient would prefer a phone call   Okay to leave a detailed message?: Yes at Cell number on file:    Telephone Information:   Mobile [phone]

## 2022-07-25 NOTE — TELEPHONE ENCOUNTER
This rx was sent the same as any other medication I've ever sent.  So I don't know what this means.  I will send it again, but it will be sent the same as it just was!

## 2022-07-27 ENCOUNTER — VIRTUAL VISIT (OUTPATIENT)
Dept: CARDIOLOGY | Facility: CLINIC | Age: 72
End: 2022-07-27

## 2022-07-27 VITALS — BODY MASS INDEX: 25.29 KG/M2 | WEIGHT: 152 LBS

## 2022-07-27 DIAGNOSIS — I35.0 NONRHEUMATIC AORTIC VALVE STENOSIS: Primary | ICD-10-CM

## 2022-07-27 PROCEDURE — 99214 OFFICE O/P EST MOD 30 MIN: CPT | Mod: 95 | Performed by: INTERNAL MEDICINE

## 2022-07-27 NOTE — PROGRESS NOTES
"    The patient has been notified of following:     \"This telephone visit will be conducted via a call between you and your physician/provider. We have found that certain health care needs can be provided without the need for a physical exam.  This service lets us provide the care you need with a phone conversation.  If a prescription is necessary we can send it directly to your pharmacy.  If lab work is needed we can place an order for that and you can then stop by our lab to have the test done at a later time. If during the course of the call the physician/provider feels a telephone visit is not appropriate, you will not be charged for this service.\" Verbal consent has been obtained for this service by care team member:         HEART CARE PHONE ENCOUNTER      [facility]  [phone]          The patient has chosen to have the visit conducted as a telephone visit, to reduce risk of exposure given the current status of Coronavirus in our community. This telephone visit is being conducted via a call between the patient and physician/provider. Health care needs are being provided without a physical exam.     Assessment/Recommendations   Assessment:     Aortic stenosis, moderate  Paroxysmal atrial fibrillation status post PVI in 2012, no recurrent documented episodes  Paroxysmal atrial flutter status post remote ablation, no new episodes  Diabetes mellitus  COPD, active tobacco user  Fibromyalgia  Plan:  There has been mild progression of aortic stenosis.  She has not reached a point of requiring replacement.  We will repeat echo in a year      Follow Up Plan: 1 year  I have reviewed the note as documented.  This accurately captures the substance of my conversation with the patient.           History of Present Illness/Subjective    Mary Kay Tejada is a 72 year old female who is being evaluated via a billable telephone visit.    She reports no change to her shortness of breath.  Using inhaler to " "relieve shortness of breath.  Her weights have been stable.  She has no PND and orthopnea.  She denies exertional chest pain.  She has not had passout    I have reviewed and updated the patient's Past Medical History, Social History, Family History and Medication List.     Physical Examination not performed given phone encounter Review of Systems      Review of Systems - History obtained from the patient  General ROS: negative  Psychological ROS: negative  Ophthalmic ROS: negative  ENT ROS: negative  Allergy and Immunology ROS: negative  Hematological and Lymphatic ROS: negative  Endocrine ROS: negative  Gastrointestinal ROS: no abdominal pain, change in bowel habits, or black or bloody stools  Genito-Urinary ROS: no dysuria, trouble voiding, or hematuria  Musculoskeletal ROS: negative  Neurological ROS: no TIA or stroke symptoms  Dermatological ROS: negative       Medications  Allergies   Current Outpatient Medications   Medication Sig Dispense Refill     ACCU-CHEK SOFTCLIX LANCETS lancets [ACCU-CHEK SOFTCLIX LANCETS LANCETS] TEST THREE TIMES DAILY 300 each 3     albuterol (PROAIR HFA/PROVENTIL HFA/VENTOLIN HFA) 108 (90 Base) MCG/ACT inhaler INHALE 2 PUFFS BY MOUTH EVERY 4 HOURS AS NEEDED FOR WHEEZING 87.1 g 1     atorvastatin (LIPITOR) 20 MG tablet TAKE 1 TABLET(20 MG) BY MOUTH AT BEDTIME 90 tablet 2     BD ULTRA-FINE SHORT PEN NEEDLE 31 gauge x 5/16\" Ndle [BD ULTRA-FINE SHORT PEN NEEDLE 31 GAUGE X 5/16\" NDLE] TEST FOUR TIMES DAILY WITH MEALS AND AT BEDTIME 400 each 3     blood-glucose meter (ONETOUCH VERIO IQ METER) Misc [BLOOD-GLUCOSE METER (ONETOUCH VERIO IQ METER) MISC] Check blood sugar three times a day. 1 each 0     budesonide-formoterol (SYMBICORT) 160-4.5 MCG/ACT Inhaler Inhale 2 puffs into the lungs 2 times daily 10.2 g 3     diaper,brief,adult,disposable (ADULT BRIEFS - LARGE) Misc [DIAPER,BRIEF,ADULT,DISPOSABLE (ADULT BRIEFS - LARGE) MISC] Use 3-4 daily as needed for incontinence 120 each 6     " diltiazem ER COATED BEADS (CARDIZEM CD/CARTIA XT) 120 MG 24 hr capsule TAKE 1 CAPSULE(120 MG) BY MOUTH DAILY 90 capsule 0     furosemide (LASIX) 20 MG tablet Take 1 tablet (20 mg) by mouth daily as needed 90 tablet 3     gabapentin (NEURONTIN) 600 MG tablet TAKE 1 TABLET(600 MG) BY MOUTH THREE TIMES DAILY 90 tablet 4     generic lancets (FINGERSTIX LANCETS) [GENERIC LANCETS (FINGERSTIX LANCETS)] Dispense brand per patient's insurance at pharmacy discretion. 300 each 0     ipratropium - albuterol 0.5 mg/2.5 mg/3 mL (DUONEB) 0.5-2.5 (3) MG/3ML neb solution Take 1 vial (3 mLs) by nebulization every 4 hours as needed for shortness of breath / dyspnea or wheezing 90 mL 0     meclizine (ANTIVERT) 25 MG tablet Take 1 tablet (25 mg) by mouth 3 times daily as needed for dizziness or nausea 45 tablet 5     metFORMIN (GLUCOPHAGE) 500 MG tablet TAKE 1 TABLET(500 MG) BY MOUTH TWICE DAILY WITH MEALS 180 tablet 0     nystatin (NYSTOP) powder [NYSTATIN (NYSTOP) POWDER] Apply 1 application topically 2 (two) times a day as needed. 2-3 times to affected area(s). 60 g 3     omeprazole (PRILOSEC) 20 MG DR capsule TAKE 1 CAPSULE(20 MG) BY MOUTH DAILY BEFORE AND BREAKFAST 90 capsule 3     ONETOUCH VERIO IQ test strip USE 1 EACH AS DIRECTED THREE TIMES DAILY AS NEEDED 300 strip 3     oxyCODONE IR (ROXICODONE) 10 MG tablet Take 1 tablet (10 mg) by mouth every 6 hours as needed for moderate to severe pain 120 tablet 0     predniSONE (DELTASONE) 20 MG tablet Take 2 tablets (40 mg) by mouth daily 40mg 1 day, 30mg 2 days, 20mg 2 days, 10mg 2 days. 8 tablet 0     sucralfate (CARAFATE) 1 GM tablet TAKE 1 TABLET BY MOUTH FOUR TIMES DAILY(CRUSH TABLET AND MIX IT WITH A LITTLE WATER, THEN SWALLOW) 360 tablet 3     SYMBICORT 160-4.5 MCG/ACT Inhaler Inhale 2 puffs into the lungs 2 times daily 10.2 g 3     warfarin ANTICOAGULANT (COUMADIN) 5 MG tablet TAKE 1/2 TO 1 TABLET BY MOUTH DAILY AS DIRECTED. ADJUST DOSE PER INR RESULTS (Patient taking  "differently: Take 2.5-5 mg by mouth See Admin Instructions 2.5mg Mon, wed and Saturday; 5mg ROW) 90 tablet 1     cyclobenzaprine (FLEXERIL) 10 MG tablet Take 1 tablet (10 mg) by mouth nightly as needed for muscle spasms (Patient not taking: Reported on 7/27/2022) 30 tablet 0     guaiFENesin-codeine (ROBITUSSIN AC) 100-10 MG/5ML solution Take 5-10 mLs by mouth every 4 hours as needed for cough (Patient not taking: Reported on 7/27/2022) 180 mL 0     naloxone (NARCAN) 4 MG/0.1ML nasal spray Spray 1 spray (4 mg) into one nostril alternating nostrils once as needed for opioid reversal every 2-3 minutes until assistance arrives (Patient not taking: Reported on 7/27/2022) 0.2 mL 0    Allergies   Allergen Reactions     Celebrex [Celecoxib] Rash     patient had butterfly rash - \"lupus-like\"       Latex Rash         Lab Results    Chemistry/lipid CBC Cardiac Enzymes/BNP/TSH/INR   Lab Results   Component Value Date    CHOL 151 07/25/2022    HDL 84 07/25/2022    TRIG 72 07/25/2022    BUN 12.5 07/25/2022     07/25/2022    CO2 30 (H) 07/25/2022    Lab Results   Component Value Date    WBC 7.6 07/25/2022    HGB 9.0 (L) 07/25/2022    HCT 32.3 (L) 07/25/2022    MCV 80 07/25/2022     07/25/2022    Lab Results   Component Value Date    TROPONINI 0.05 03/27/2022    BNP 67 03/26/2022    TSH 1.40 12/24/2021    INR 3.7 (H) 07/25/2022        Bao Pizano MD    Medications  Allergies   Current Outpatient Medications   Medication Sig Dispense Refill     ACCU-CHEK SOFTCLIX LANCETS lancets [ACCU-CHEK SOFTCLIX LANCETS LANCETS] TEST THREE TIMES DAILY 300 each 3     albuterol (PROAIR HFA/PROVENTIL HFA/VENTOLIN HFA) 108 (90 Base) MCG/ACT inhaler INHALE 2 PUFFS BY MOUTH EVERY 4 HOURS AS NEEDED FOR WHEEZING 87.1 g 1     atorvastatin (LIPITOR) 20 MG tablet TAKE 1 TABLET(20 MG) BY MOUTH AT BEDTIME 90 tablet 2     BD ULTRA-FINE SHORT PEN NEEDLE 31 gauge x 5/16\" Ndle [BD ULTRA-FINE SHORT PEN NEEDLE 31 GAUGE X 5/16\" NDLE] TEST FOUR TIMES " DAILY WITH MEALS AND AT BEDTIME 400 each 3     blood-glucose meter (ONETOUCH VERIO IQ METER) Misc [BLOOD-GLUCOSE METER (ONETOUCH VERIO IQ METER) MISC] Check blood sugar three times a day. 1 each 0     budesonide-formoterol (SYMBICORT) 160-4.5 MCG/ACT Inhaler Inhale 2 puffs into the lungs 2 times daily 10.2 g 3     diaper,brief,adult,disposable (ADULT BRIEFS - LARGE) Misc [DIAPER,BRIEF,ADULT,DISPOSABLE (ADULT BRIEFS - LARGE) MISC] Use 3-4 daily as needed for incontinence 120 each 6     diltiazem ER COATED BEADS (CARDIZEM CD/CARTIA XT) 120 MG 24 hr capsule TAKE 1 CAPSULE(120 MG) BY MOUTH DAILY 90 capsule 0     furosemide (LASIX) 20 MG tablet Take 1 tablet (20 mg) by mouth daily as needed 90 tablet 3     gabapentin (NEURONTIN) 600 MG tablet TAKE 1 TABLET(600 MG) BY MOUTH THREE TIMES DAILY 90 tablet 4     generic lancets (FINGERSTIX LANCETS) [GENERIC LANCETS (FINGERSTIX LANCETS)] Dispense brand per patient's insurance at pharmacy discretion. 300 each 0     ipratropium - albuterol 0.5 mg/2.5 mg/3 mL (DUONEB) 0.5-2.5 (3) MG/3ML neb solution Take 1 vial (3 mLs) by nebulization every 4 hours as needed for shortness of breath / dyspnea or wheezing 90 mL 0     meclizine (ANTIVERT) 25 MG tablet Take 1 tablet (25 mg) by mouth 3 times daily as needed for dizziness or nausea 45 tablet 5     metFORMIN (GLUCOPHAGE) 500 MG tablet TAKE 1 TABLET(500 MG) BY MOUTH TWICE DAILY WITH MEALS 180 tablet 0     nystatin (NYSTOP) powder [NYSTATIN (NYSTOP) POWDER] Apply 1 application topically 2 (two) times a day as needed. 2-3 times to affected area(s). 60 g 3     omeprazole (PRILOSEC) 20 MG DR capsule TAKE 1 CAPSULE(20 MG) BY MOUTH DAILY BEFORE AND BREAKFAST 90 capsule 3     ONETOUCH VERIO IQ test strip USE 1 EACH AS DIRECTED THREE TIMES DAILY AS NEEDED 300 strip 3     oxyCODONE IR (ROXICODONE) 10 MG tablet Take 1 tablet (10 mg) by mouth every 6 hours as needed for moderate to severe pain 120 tablet 0     predniSONE (DELTASONE) 20 MG tablet  "Take 2 tablets (40 mg) by mouth daily 40mg 1 day, 30mg 2 days, 20mg 2 days, 10mg 2 days. 8 tablet 0     sucralfate (CARAFATE) 1 GM tablet TAKE 1 TABLET BY MOUTH FOUR TIMES DAILY(CRUSH TABLET AND MIX IT WITH A LITTLE WATER, THEN SWALLOW) 360 tablet 3     SYMBICORT 160-4.5 MCG/ACT Inhaler Inhale 2 puffs into the lungs 2 times daily 10.2 g 3     warfarin ANTICOAGULANT (COUMADIN) 5 MG tablet TAKE 1/2 TO 1 TABLET BY MOUTH DAILY AS DIRECTED. ADJUST DOSE PER INR RESULTS (Patient taking differently: Take 2.5-5 mg by mouth See Admin Instructions 2.5mg Mon, wed and Saturday; 5mg ROW) 90 tablet 1     cyclobenzaprine (FLEXERIL) 10 MG tablet Take 1 tablet (10 mg) by mouth nightly as needed for muscle spasms (Patient not taking: Reported on 7/27/2022) 30 tablet 0     guaiFENesin-codeine (ROBITUSSIN AC) 100-10 MG/5ML solution Take 5-10 mLs by mouth every 4 hours as needed for cough (Patient not taking: Reported on 7/27/2022) 180 mL 0     naloxone (NARCAN) 4 MG/0.1ML nasal spray Spray 1 spray (4 mg) into one nostril alternating nostrils once as needed for opioid reversal every 2-3 minutes until assistance arrives (Patient not taking: Reported on 7/27/2022) 0.2 mL 0       Allergies   Allergen Reactions     Celebrex [Celecoxib] Rash     patient had butterfly rash - \"lupus-like\"       Latex Rash          Lab Results    Chemistry/lipid CBC Cardiac Enzymes/BNP/TSH/INR   Recent Labs   Lab Test 07/25/22  1026   CHOL 151   HDL 84   LDL 53   TRIG 72     Recent Labs   Lab Test 07/25/22  1026 12/24/21  0756 07/21/21  0911   LDL 53 77 72     Recent Labs   Lab Test 07/25/22  1026      POTASSIUM 4.0   CHLORIDE 101   CO2 30*   GLC 92   BUN 12.5   CR 0.51   GFRESTIMATED >90   JOEY 9.3     Recent Labs   Lab Test 07/25/22  1026 03/27/22  0527 03/26/22  1946   CR 0.51 0.65 0.69     Recent Labs   Lab Test 07/25/22  1026 12/24/21  0756 07/21/21  0911   A1C 5.8* 6.1* 5.8*          Recent Labs   Lab Test 07/25/22  1026   WBC 7.6   HGB 9.0*   HCT " 32.3*   MCV 80        Recent Labs   Lab Test 07/25/22  1026 03/27/22  0527 03/26/22 1946   HGB 9.0* 9.1* 9.9*    Recent Labs   Lab Test 03/27/22  0527 03/26/22 1946   TROPONINI 0.05 0.01     Recent Labs   Lab Test 03/26/22 1946   BNP 67     Recent Labs   Lab Test 12/24/21  0756   TSH 1.40     Recent Labs   Lab Test 07/25/22  1026 06/24/22  1023 05/25/22  1116   INR 3.7* 3.3* 3.1*        Bao Pizano MD

## 2022-07-27 NOTE — LETTER
"7/27/2022    SAVANA SILVER MD  [address] [place] [id number]    RE: Mary Kay S Terrell       Dear Colleague,     I had the pleasure of seeing Mary Kay S Terrell in the [facility].      The patient has been notified of following:     \"This telephone visit will be conducted via a call between you and your physician/provider. We have found that certain health care needs can be provided without the need for a physical exam.  This service lets us provide the care you need with a phone conversation.  If a prescription is necessary we can send it directly to your pharmacy.  If lab work is needed we can place an order for that and you can then stop by our lab to have the test done at a later time. If during the course of the call the physician/provider feels a telephone visit is not appropriate, you will not be charged for this service.\" Verbal consent has been obtained for this service by care team member:         HEART CARE PHONE ENCOUNTER      [facility]  [phone]          The patient has chosen to have the visit conducted as a telephone visit, to reduce risk of exposure given the current status of Coronavirus in our community. This telephone visit is being conducted via a call between the patient and physician/provider. Health care needs are being provided without a physical exam.     Assessment/Recommendations   Assessment:     Aortic stenosis, moderate  Paroxysmal atrial fibrillation status post PVI in 2012, no recurrent documented episodes  Paroxysmal atrial flutter status post remote ablation, no new episodes  Diabetes mellitus  COPD, active tobacco user  Fibromyalgia  Plan:  There has been mild progression of aortic stenosis.  She has not reached a point of requiring replacement.  We will repeat echo in a year      Follow Up Plan: 1 year  I have reviewed the note as documented.  This accurately captures the substance of my conversation with the patient.       " "    History of Present Illness/Subjective    Mary Kay Tejada is a 72 year old female who is being evaluated via a billable telephone visit.    She reports no change to her shortness of breath.  Using inhaler to relieve shortness of breath.  Her weights have been stable.  She has no PND and orthopnea.  She denies exertional chest pain.  She has not had passout    I have reviewed and updated the patient's Past Medical History, Social History, Family History and Medication List.     Physical Examination not performed given phone encounter Review of Systems      Review of Systems - History obtained from the patient  General ROS: negative  Psychological ROS: negative  Ophthalmic ROS: negative  ENT ROS: negative  Allergy and Immunology ROS: negative  Hematological and Lymphatic ROS: negative  Endocrine ROS: negative  Gastrointestinal ROS: no abdominal pain, change in bowel habits, or black or bloody stools  Genito-Urinary ROS: no dysuria, trouble voiding, or hematuria  Musculoskeletal ROS: negative  Neurological ROS: no TIA or stroke symptoms  Dermatological ROS: negative       Medications  Allergies   Current Outpatient Medications   Medication Sig Dispense Refill     ACCU-CHEK SOFTCLIX LANCETS lancets [ACCU-CHEK SOFTCLIX LANCETS LANCETS] TEST THREE TIMES DAILY 300 each 3     albuterol (PROAIR HFA/PROVENTIL HFA/VENTOLIN HFA) 108 (90 Base) MCG/ACT inhaler INHALE 2 PUFFS BY MOUTH EVERY 4 HOURS AS NEEDED FOR WHEEZING 87.1 g 1     atorvastatin (LIPITOR) 20 MG tablet TAKE 1 TABLET(20 MG) BY MOUTH AT BEDTIME 90 tablet 2     BD ULTRA-FINE SHORT PEN NEEDLE 31 gauge x 5/16\" Ndle [BD ULTRA-FINE SHORT PEN NEEDLE 31 GAUGE X 5/16\" NDLE] TEST FOUR TIMES DAILY WITH MEALS AND AT BEDTIME 400 each 3     blood-glucose meter (ONETOUCH VERIO IQ METER) Misc [BLOOD-GLUCOSE METER (ONETOUCH VERIO IQ METER) MISC] Check blood sugar three times a day. 1 each 0     budesonide-formoterol (SYMBICORT) 160-4.5 MCG/ACT Inhaler Inhale 2 puffs into the " lungs 2 times daily 10.2 g 3     diaper,brief,adult,disposable (ADULT BRIEFS - LARGE) Misc [DIAPER,BRIEF,ADULT,DISPOSABLE (ADULT BRIEFS - LARGE) MISC] Use 3-4 daily as needed for incontinence 120 each 6     diltiazem ER COATED BEADS (CARDIZEM CD/CARTIA XT) 120 MG 24 hr capsule TAKE 1 CAPSULE(120 MG) BY MOUTH DAILY 90 capsule 0     furosemide (LASIX) 20 MG tablet Take 1 tablet (20 mg) by mouth daily as needed 90 tablet 3     gabapentin (NEURONTIN) 600 MG tablet TAKE 1 TABLET(600 MG) BY MOUTH THREE TIMES DAILY 90 tablet 4     generic lancets (FINGERSTIX LANCETS) [GENERIC LANCETS (FINGERSTIX LANCETS)] Dispense brand per patient's insurance at pharmacy discretion. 300 each 0     ipratropium - albuterol 0.5 mg/2.5 mg/3 mL (DUONEB) 0.5-2.5 (3) MG/3ML neb solution Take 1 vial (3 mLs) by nebulization every 4 hours as needed for shortness of breath / dyspnea or wheezing 90 mL 0     meclizine (ANTIVERT) 25 MG tablet Take 1 tablet (25 mg) by mouth 3 times daily as needed for dizziness or nausea 45 tablet 5     metFORMIN (GLUCOPHAGE) 500 MG tablet TAKE 1 TABLET(500 MG) BY MOUTH TWICE DAILY WITH MEALS 180 tablet 0     nystatin (NYSTOP) powder [NYSTATIN (NYSTOP) POWDER] Apply 1 application topically 2 (two) times a day as needed. 2-3 times to affected area(s). 60 g 3     omeprazole (PRILOSEC) 20 MG DR capsule TAKE 1 CAPSULE(20 MG) BY MOUTH DAILY BEFORE AND BREAKFAST 90 capsule 3     ONETOUCH VERIO IQ test strip USE 1 EACH AS DIRECTED THREE TIMES DAILY AS NEEDED 300 strip 3     oxyCODONE IR (ROXICODONE) 10 MG tablet Take 1 tablet (10 mg) by mouth every 6 hours as needed for moderate to severe pain 120 tablet 0     predniSONE (DELTASONE) 20 MG tablet Take 2 tablets (40 mg) by mouth daily 40mg 1 day, 30mg 2 days, 20mg 2 days, 10mg 2 days. 8 tablet 0     sucralfate (CARAFATE) 1 GM tablet TAKE 1 TABLET BY MOUTH FOUR TIMES DAILY(CRUSH TABLET AND MIX IT WITH A LITTLE WATER, THEN SWALLOW) 360 tablet 3     SYMBICORT 160-4.5 MCG/ACT  "Inhaler Inhale 2 puffs into the lungs 2 times daily 10.2 g 3     warfarin ANTICOAGULANT (COUMADIN) 5 MG tablet TAKE 1/2 TO 1 TABLET BY MOUTH DAILY AS DIRECTED. ADJUST DOSE PER INR RESULTS (Patient taking differently: Take 2.5-5 mg by mouth See Admin Instructions 2.5mg Mon, wed and Saturday; 5mg ROW) 90 tablet 1     cyclobenzaprine (FLEXERIL) 10 MG tablet Take 1 tablet (10 mg) by mouth nightly as needed for muscle spasms (Patient not taking: Reported on 7/27/2022) 30 tablet 0     guaiFENesin-codeine (ROBITUSSIN AC) 100-10 MG/5ML solution Take 5-10 mLs by mouth every 4 hours as needed for cough (Patient not taking: Reported on 7/27/2022) 180 mL 0     naloxone (NARCAN) 4 MG/0.1ML nasal spray Spray 1 spray (4 mg) into one nostril alternating nostrils once as needed for opioid reversal every 2-3 minutes until assistance arrives (Patient not taking: Reported on 7/27/2022) 0.2 mL 0    Allergies   Allergen Reactions     Celebrex [Celecoxib] Rash     patient had butterfly rash - \"lupus-like\"       Latex Rash         Lab Results    Chemistry/lipid CBC Cardiac Enzymes/BNP/TSH/INR   Lab Results   Component Value Date    CHOL 151 07/25/2022    HDL 84 07/25/2022    TRIG 72 07/25/2022    BUN 12.5 07/25/2022     07/25/2022    CO2 30 (H) 07/25/2022    Lab Results   Component Value Date    WBC 7.6 07/25/2022    HGB 9.0 (L) 07/25/2022    HCT 32.3 (L) 07/25/2022    MCV 80 07/25/2022     07/25/2022    Lab Results   Component Value Date    TROPONINI 0.05 03/27/2022    BNP 67 03/26/2022    TSH 1.40 12/24/2021    INR 3.7 (H) 07/25/2022        Bao Pizano MD    Medications  Allergies   Current Outpatient Medications   Medication Sig Dispense Refill     ACCU-CHEK SOFTCLIX LANCETS lancets [ACCU-CHEK SOFTCLIX LANCETS LANCETS] TEST THREE TIMES DAILY 300 each 3     albuterol (PROAIR HFA/PROVENTIL HFA/VENTOLIN HFA) 108 (90 Base) MCG/ACT inhaler INHALE 2 PUFFS BY MOUTH EVERY 4 HOURS AS NEEDED FOR WHEEZING 87.1 g 1     atorvastatin " "(LIPITOR) 20 MG tablet TAKE 1 TABLET(20 MG) BY MOUTH AT BEDTIME 90 tablet 2     BD ULTRA-FINE SHORT PEN NEEDLE 31 gauge x 5/16\" Ndle [BD ULTRA-FINE SHORT PEN NEEDLE 31 GAUGE X 5/16\" NDLE] TEST FOUR TIMES DAILY WITH MEALS AND AT BEDTIME 400 each 3     blood-glucose meter (ONETOUCH VERIO IQ METER) Misc [BLOOD-GLUCOSE METER (ONETOUCH VERIO IQ METER) MISC] Check blood sugar three times a day. 1 each 0     budesonide-formoterol (SYMBICORT) 160-4.5 MCG/ACT Inhaler Inhale 2 puffs into the lungs 2 times daily 10.2 g 3     diaper,brief,adult,disposable (ADULT BRIEFS - LARGE) Misc [DIAPER,BRIEF,ADULT,DISPOSABLE (ADULT BRIEFS - LARGE) MISC] Use 3-4 daily as needed for incontinence 120 each 6     diltiazem ER COATED BEADS (CARDIZEM CD/CARTIA XT) 120 MG 24 hr capsule TAKE 1 CAPSULE(120 MG) BY MOUTH DAILY 90 capsule 0     furosemide (LASIX) 20 MG tablet Take 1 tablet (20 mg) by mouth daily as needed 90 tablet 3     gabapentin (NEURONTIN) 600 MG tablet TAKE 1 TABLET(600 MG) BY MOUTH THREE TIMES DAILY 90 tablet 4     generic lancets (FINGERSTIX LANCETS) [GENERIC LANCETS (FINGERSTIX LANCETS)] Dispense brand per patient's insurance at pharmacy discretion. 300 each 0     ipratropium - albuterol 0.5 mg/2.5 mg/3 mL (DUONEB) 0.5-2.5 (3) MG/3ML neb solution Take 1 vial (3 mLs) by nebulization every 4 hours as needed for shortness of breath / dyspnea or wheezing 90 mL 0     meclizine (ANTIVERT) 25 MG tablet Take 1 tablet (25 mg) by mouth 3 times daily as needed for dizziness or nausea 45 tablet 5     metFORMIN (GLUCOPHAGE) 500 MG tablet TAKE 1 TABLET(500 MG) BY MOUTH TWICE DAILY WITH MEALS 180 tablet 0     nystatin (NYSTOP) powder [NYSTATIN (NYSTOP) POWDER] Apply 1 application topically 2 (two) times a day as needed. 2-3 times to affected area(s). 60 g 3     omeprazole (PRILOSEC) 20 MG DR capsule TAKE 1 CAPSULE(20 MG) BY MOUTH DAILY BEFORE AND BREAKFAST 90 capsule 3     ONETOUCH VERIO IQ test strip USE 1 EACH AS DIRECTED THREE TIMES DAILY " "AS NEEDED 300 strip 3     oxyCODONE IR (ROXICODONE) 10 MG tablet Take 1 tablet (10 mg) by mouth every 6 hours as needed for moderate to severe pain 120 tablet 0     predniSONE (DELTASONE) 20 MG tablet Take 2 tablets (40 mg) by mouth daily 40mg 1 day, 30mg 2 days, 20mg 2 days, 10mg 2 days. 8 tablet 0     sucralfate (CARAFATE) 1 GM tablet TAKE 1 TABLET BY MOUTH FOUR TIMES DAILY(CRUSH TABLET AND MIX IT WITH A LITTLE WATER, THEN SWALLOW) 360 tablet 3     SYMBICORT 160-4.5 MCG/ACT Inhaler Inhale 2 puffs into the lungs 2 times daily 10.2 g 3     warfarin ANTICOAGULANT (COUMADIN) 5 MG tablet TAKE 1/2 TO 1 TABLET BY MOUTH DAILY AS DIRECTED. ADJUST DOSE PER INR RESULTS (Patient taking differently: Take 2.5-5 mg by mouth See Admin Instructions 2.5mg Mon, wed and Saturday; 5mg ROW) 90 tablet 1     cyclobenzaprine (FLEXERIL) 10 MG tablet Take 1 tablet (10 mg) by mouth nightly as needed for muscle spasms (Patient not taking: Reported on 7/27/2022) 30 tablet 0     guaiFENesin-codeine (ROBITUSSIN AC) 100-10 MG/5ML solution Take 5-10 mLs by mouth every 4 hours as needed for cough (Patient not taking: Reported on 7/27/2022) 180 mL 0     naloxone (NARCAN) 4 MG/0.1ML nasal spray Spray 1 spray (4 mg) into one nostril alternating nostrils once as needed for opioid reversal every 2-3 minutes until assistance arrives (Patient not taking: Reported on 7/27/2022) 0.2 mL 0       Allergies   Allergen Reactions     Celebrex [Celecoxib] Rash     patient had butterfly rash - \"lupus-like\"       Latex Rash          Lab Results    Chemistry/lipid CBC Cardiac Enzymes/BNP/TSH/INR   Recent Labs   Lab Test 07/25/22  1026   CHOL 151   HDL 84   LDL 53   TRIG 72     Recent Labs   Lab Test 07/25/22  1026 12/24/21  0756 07/21/21  0911   LDL 53 77 72     Recent Labs   Lab Test 07/25/22  1026      POTASSIUM 4.0   CHLORIDE 101   CO2 30*   GLC 92   BUN 12.5   CR 0.51   GFRESTIMATED >90   JOEY 9.3     Recent Labs   Lab Test 07/25/22  1026 03/27/22  0527 " 03/26/22  1946   CR 0.51 0.65 0.69     Recent Labs   Lab Test 07/25/22  1026 12/24/21  0756 07/21/21  0911   A1C 5.8* 6.1* 5.8*          Recent Labs   Lab Test 07/25/22  1026   WBC 7.6   HGB 9.0*   HCT 32.3*   MCV 80        Recent Labs   Lab Test 07/25/22  1026 03/27/22  0527 03/26/22 1946   HGB 9.0* 9.1* 9.9*    Recent Labs   Lab Test 03/27/22  0527 03/26/22 1946   TROPONINI 0.05 0.01     Recent Labs   Lab Test 03/26/22 1946   BNP 67     Recent Labs   Lab Test 12/24/21  0756   TSH 1.40     Recent Labs   Lab Test 07/25/22  1026 06/24/22  1023 05/25/22  1116   INR 3.7* 3.3* 3.1*        Bao Pizano MD                Thank you for allowing me to participate in the care of your patient.      Sincerely,     Bao Pizano MD     [facility]  cc:   Bao Pizano MD  [address]  [address]  [place] [address]

## 2022-07-27 NOTE — PATIENT INSTRUCTIONS
Mary Kay Tejada,    It was a pleasure to see you today at the [facility].     My recommendations after this visit include:    Echo in 1 year    SAAD Pizano MD, FACC, ZAC

## 2022-07-31 ASSESSMENT — ENCOUNTER SYMPTOMS
COUGH: 0
NEUROLOGICAL NEGATIVE: 1
SHORTNESS OF BREATH: 0
ALLERGIC/IMMUNOLOGIC NEGATIVE: 1
PSYCHIATRIC NEGATIVE: 1
BACK PAIN: 1
POLYDIPSIA: 0
MYALGIAS: 1
UNEXPECTED WEIGHT CHANGE: 0
ARTHRALGIAS: 1
HEMATOLOGIC/LYMPHATIC NEGATIVE: 1
FEVER: 0
FATIGUE: 1
ABDOMINAL PAIN: 1
CHILLS: 0

## 2022-08-02 ENCOUNTER — TELEPHONE (OUTPATIENT)
Dept: RESPIRATORY THERAPY | Facility: HOSPITAL | Age: 72
End: 2022-08-02

## 2022-08-02 NOTE — TELEPHONE ENCOUNTER
I spoke with Mary Kay. She is feeling well today and at baseline as far as her breathing is concerned. She is able to get and take her medications and is compliant in taking them. Reviewed COPD Action Plan. Mary Kay had no problems or questions for me today.    Aamir Ross, RT, TTS, Chronic Pulmonary Disease Specialist

## 2022-08-05 DIAGNOSIS — J44.1 COPD EXACERBATION (H): ICD-10-CM

## 2022-08-06 RX ORDER — BUDESONIDE AND FORMOTEROL FUMARATE DIHYDRATE 160; 4.5 UG/1; UG/1
2 AEROSOL RESPIRATORY (INHALATION) 2 TIMES DAILY
Qty: 10.2 G | Refills: 6 | Status: SHIPPED | OUTPATIENT
Start: 2022-08-06 | End: 2023-01-06

## 2022-08-07 NOTE — TELEPHONE ENCOUNTER
"Last Written Prescription Date:  4/15/22  Last Fill Quantity: 10.2,  # refills: 3   Last office visit provider:  7/25/22     Requested Prescriptions   Pending Prescriptions Disp Refills     budesonide-formoterol (SYMBICORT) 160-4.5 MCG/ACT Inhaler 10.2 g 3     Sig: Inhale 2 puffs into the lungs 2 times daily       Inhaled Steroids Protocol Passed - 8/5/2022  2:22 PM        Passed - Patient is age 12 or older        Passed - Recent (12 mo) or future (30 days) visit within the authorizing provider's specialty     Patient has had an office visit with the authorizing provider or a provider within the authorizing providers department within the previous 12 mos or has a future within next 30 days. See \"Patient Info\" tab in inbasket, or \"Choose Columns\" in Meds & Orders section of the refill encounter.              Passed - Medication is active on med list       Long-Acting Beta Agonist Inhalers Protocol  Passed - 8/5/2022  2:22 PM        Passed - Patient is age 12 or older        Passed - Recent (12 mo) or future (30 days) visit within the authorizing provider's specialty     Patient has had an office visit with the authorizing provider or a provider within the authorizing providers department within the previous 12 mos or has a future within next 30 days. See \"Patient Info\" tab in inbasket, or \"Choose Columns\" in Meds & Orders section of the refill encounter.              Passed - Medication is active on med list             Daksha Siu RN 08/06/22 8:09 PM  "

## 2022-08-15 ENCOUNTER — TELEPHONE (OUTPATIENT)
Dept: ANTICOAGULATION | Facility: CLINIC | Age: 72
End: 2022-08-15

## 2022-08-15 NOTE — TELEPHONE ENCOUNTER
ANTICOAGULATION     Mary Kay Tejada is overdue for INR check.      Spoke with Cecilia and patient will have labs checked at next office visit on 8/24    Norberto Turk RN

## 2022-08-16 ENCOUNTER — PATIENT OUTREACH (OUTPATIENT)
Dept: GERIATRIC MEDICINE | Facility: CLINIC | Age: 72
End: 2022-08-16

## 2022-08-19 ENCOUNTER — PATIENT OUTREACH (OUTPATIENT)
Dept: GERIATRIC MEDICINE | Facility: CLINIC | Age: 72
End: 2022-08-19

## 2022-08-19 NOTE — PROGRESS NOTES
CC updated program tasks and targets for [employer] launch.    Samantha Alexandra RN, N   [facility]  [phone]

## 2022-08-19 NOTE — TELEPHONE ENCOUNTER
Please send generic medication to SYMBICORT 160-4.5 MCG/ACT Inhaler  
Routing refill request to provider for review/approval because:  Drug not active on patient's medication list  Looks like patient is still in hospital    Last Written Prescription Date:  NA  Last Fill Quantity: NA,  # refills: NA   Last office visit provider:  3/22/2022     Requested Prescriptions   Pending Prescriptions Disp Refills     cefdinir (OMNICEF) 300 MG capsule [Pharmacy Med Name: CEFDINIR 300MG CAPSULES] 20 capsule 0     Sig: TAKE 1 CAPSULE(300 MG) BY MOUTH TWICE DAILY FOR 10 DAYS       There is no refill protocol information for this order          Sanam Whitaker RN 03/29/22 12:25 AM  
Statement Selected

## 2022-08-22 DIAGNOSIS — R60.0 LOCALIZED EDEMA: ICD-10-CM

## 2022-08-22 RX ORDER — FUROSEMIDE 20 MG
TABLET ORAL
Qty: 90 TABLET | Refills: 3 | Status: ON HOLD | OUTPATIENT
Start: 2022-08-22 | End: 2023-01-19

## 2022-08-22 NOTE — TELEPHONE ENCOUNTER
"Last Written Prescription Date:  8/25/21  Last Fill Quantity: 90,  # refills: 3   Last office visit provider:  7/25/22     Requested Prescriptions   Pending Prescriptions Disp Refills     furosemide (LASIX) 20 MG tablet [Pharmacy Med Name: FUROSEMIDE 20MG TABLETS] 90 tablet 3     Sig: TAKE 1 TABLET(20 MG) BY MOUTH DAILY AS NEEDED       Diuretics (Including Combos) Protocol Passed - 8/22/2022  2:16 PM        Passed - Blood pressure under 140/90 in past 12 months     BP Readings from Last 3 Encounters:   07/25/22 102/62   06/24/22 116/58   05/25/22 133/65                 Passed - Recent (12 mo) or future (30 days) visit within the authorizing provider's specialty     Patient has had an office visit with the authorizing provider or a provider within the authorizing providers department within the previous 12 mos or has a future within next 30 days. See \"Patient Info\" tab in inbasket, or \"Choose Columns\" in Meds & Orders section of the refill encounter.              Passed - Medication is active on med list        Passed - Patient is age 18 or older        Passed - No active pregancy on record        Passed - Normal serum creatinine on file in past 12 months     Recent Labs   Lab Test 07/25/22  1026   CR 0.51              Passed - Normal serum potassium on file in past 12 months     Recent Labs   Lab Test 07/25/22  1026   POTASSIUM 4.0                    Passed - Normal serum sodium on file in past 12 months     Recent Labs   Lab Test 07/25/22  1026                 Passed - No positive pregnancy test in past 12 months             Quincy Dang RN 08/22/22 2:16 PM  "

## 2022-08-23 ENCOUNTER — MEDICAL CORRESPONDENCE (OUTPATIENT)
Dept: HEALTH INFORMATION MANAGEMENT | Facility: CLINIC | Age: 72
End: 2022-08-23

## 2022-08-24 ENCOUNTER — OFFICE VISIT (OUTPATIENT)
Dept: FAMILY MEDICINE | Facility: CLINIC | Age: 72
End: 2022-08-24
Payer: COMMERCIAL

## 2022-08-24 ENCOUNTER — ANTICOAGULATION THERAPY VISIT (OUTPATIENT)
Dept: ANTICOAGULATION | Facility: CLINIC | Age: 72
End: 2022-08-24

## 2022-08-24 VITALS
SYSTOLIC BLOOD PRESSURE: 135 MMHG | BODY MASS INDEX: 25 KG/M2 | RESPIRATION RATE: 28 BRPM | OXYGEN SATURATION: 92 % | HEART RATE: 81 BPM | DIASTOLIC BLOOD PRESSURE: 61 MMHG | WEIGHT: 150.25 LBS | TEMPERATURE: 97.2 F

## 2022-08-24 DIAGNOSIS — I48.91 ATRIAL FIBRILLATION, UNSPECIFIED TYPE (H): ICD-10-CM

## 2022-08-24 DIAGNOSIS — I48.91 ATRIAL FIBRILLATION (H): Primary | ICD-10-CM

## 2022-08-24 DIAGNOSIS — Z79.899 ENCOUNTER FOR LONG-TERM (CURRENT) USE OF MEDICATIONS: ICD-10-CM

## 2022-08-24 DIAGNOSIS — M79.7 FIBROMYALGIA: ICD-10-CM

## 2022-08-24 DIAGNOSIS — F33.1 MODERATE EPISODE OF RECURRENT MAJOR DEPRESSIVE DISORDER (H): ICD-10-CM

## 2022-08-24 DIAGNOSIS — E11.42 DIABETIC POLYNEUROPATHY ASSOCIATED WITH TYPE 2 DIABETES MELLITUS (H): ICD-10-CM

## 2022-08-24 DIAGNOSIS — J44.1 COPD EXACERBATION (H): ICD-10-CM

## 2022-08-24 DIAGNOSIS — Z12.11 SCREEN FOR COLON CANCER: ICD-10-CM

## 2022-08-24 DIAGNOSIS — G89.4 CHRONIC PAIN SYNDROME: ICD-10-CM

## 2022-08-24 LAB — INR BLD: 2 (ref 0.9–1.1)

## 2022-08-24 PROCEDURE — 99214 OFFICE O/P EST MOD 30 MIN: CPT | Mod: 25 | Performed by: FAMILY MEDICINE

## 2022-08-24 PROCEDURE — 85610 PROTHROMBIN TIME: CPT | Performed by: FAMILY MEDICINE

## 2022-08-24 PROCEDURE — 20553 NJX 1/MLT TRIGGER POINTS 3/>: CPT | Performed by: FAMILY MEDICINE

## 2022-08-24 PROCEDURE — 36416 COLLJ CAPILLARY BLOOD SPEC: CPT | Performed by: FAMILY MEDICINE

## 2022-08-24 RX ORDER — OXYCODONE HYDROCHLORIDE 10 MG/1
10 TABLET ORAL EVERY 6 HOURS PRN
Qty: 120 TABLET | Refills: 0 | Status: SHIPPED | OUTPATIENT
Start: 2022-08-24 | End: 2022-09-27

## 2022-08-24 NOTE — PROGRESS NOTES
1. Fibromyalgia  Patient is here for trigger point injections for fibromyalgia:  [facility]    Procedure: MSK: Inject Trigger Points, >3    Date/Time: 8/24/2022 11:15 AM  Performed by: Cammy Bui MD  Authorized by: Cammy Bui MD       UNIVERSAL PROTOCOL   Site Marked: Yes  Prior Images Obtained and Reviewed:  NA  Required items: Required blood products, implants, devices and special equipment available (NA)    Patient identity confirmed:  Verbally with patient  NA - No sedation, light sedation, or local anesthesia  Confirmation Checklist:  Patient's identity using two indicators  Time out: Immediately prior to the procedure a time out was called    Universal Protocol: the Joint Commission Universal Protocol was followed    Preparation: Patient was prepped and draped in usual sterile fashion    ESBL (mL):  0    SEDATION    Patient Sedated: No      PROCEDURE  Describe Procedure: 8 trigger points identified on upper thoracic/distal cervical back - these were all prepped with Betadine and injected with total of 10 ml Lidocaine 1% (no epi).    Length of time physician/provider present for 1:1 monitoring during sedation: 0 (NA)      - oxyCODONE IR (ROXICODONE) 10 MG tablet; Take 1 tablet (10 mg) by mouth every 6 hours as needed for moderate to severe pain  Dispense: 120 tablet; Refill: 0    2. Chronic pain syndrome  Patient continues to use Oxycodone for pain relief - has considerable arthritis;degenerative pain, but unable to tolerate NSAIDs tiffany to underlying medical conditions and GI bleeding   - oxyCODONE IR (ROXICODONE) 10 MG tablet; Take 1 tablet (10 mg) by mouth every 6 hours as needed for moderate to severe pain  Dispense: 120 tablet; Refill: 0    3. COPD exacerbation (H)  H/o COPD - currently stable     4. Screen for colon cancer  Due for colon cancer screening - declines    5. Diabetic polyneuropathy associated with type 2 diabetes mellitus  (H)  H/o type II DM  Hemoglobin A1C   Date Value Ref Range Status   07/25/2022 5.8 (H) 0.0 - 5.6 % Final     Comment:     Normal <5.7%   Prediabetes 5.7-6.4%    Diabetes 6.5% or higher     Note: Adopted from ADA consensus guidelines.         6. Encounter for long-term (current) use of medications  Due for refills of Oxycodone - done    7. Moderate episode of recurrent major depressive disorder (H)  Patient has underlying chronic depression - stable     8. Atrial fibrillation, unspecified type (H)  Due for INR  - INR point of care      Carol Muñiz is a 72 year old, presenting for the following health issues:  Imm/Inj      Desires trigger point injections in upper back/neck  Has been getting injections monthly -   Able to manage with trigger point injections and pain medications    No recent known GI bleeding                Review of Systems   Constitutional: Positive for fatigue. Negative for chills, fever and unexpected weight change.   HENT: Negative.    Eyes: Negative for visual disturbance.   Respiratory: Negative for cough and shortness of breath.    Cardiovascular: Negative for chest pain and peripheral edema.   Gastrointestinal: Negative for abdominal pain and hematochezia.   Endocrine: Negative for polydipsia and polyuria.   Breasts:  negative.    Genitourinary: Negative.    Musculoskeletal: Positive for arthralgias.        Chronic pain     Allergic/Immunologic: Negative.    Neurological: Negative.    Hematological: Bruises/bleeds easily.   Psychiatric/Behavioral: Positive for mood changes.   All other systems reviewed and are negative.           Objective    /61 (BP Location: Left arm, Patient Position: Sitting, Cuff Size: Adult Small)   Pulse 81   Temp 97.2  F (36.2  C)   Resp 28   Wt 68.2 kg (150 lb 4 oz)   SpO2 92%   BMI 25.00 kg/m    Body mass index is 25 kg/m .  Physical Exam  Constitutional:       General: She is not in acute distress.     Appearance: She is well-developed. She  is ill-appearing (looks chronically ill - older than stated age).   HENT:      Right Ear: Tympanic membrane and external ear normal.      Left Ear: Tympanic membrane and external ear normal.      Nose: Nose normal.      Mouth/Throat:      Pharynx: No oropharyngeal exudate.   Eyes:      General:         Right eye: No discharge.         Left eye: No discharge.      Conjunctiva/sclera: Conjunctivae normal.      Pupils: Pupils are equal, round, and reactive to light.   Neck:      Thyroid: No thyromegaly.      Trachea: No tracheal deviation.   Cardiovascular:      Rate and Rhythm: Normal rate and regular rhythm.      Pulses: Normal pulses.      Heart sounds: Normal heart sounds, S1 normal and S2 normal. No murmur heard.    No friction rub. No S3 or S4 sounds.   Pulmonary:      Effort: Pulmonary effort is normal. No respiratory distress.      Breath sounds: Normal breath sounds. No wheezing or rales.   Abdominal:      General: Bowel sounds are normal.      Palpations: Abdomen is soft. There is no mass.      Tenderness: There is no abdominal tenderness.   Musculoskeletal:         General: Tenderness (multiple trigger points - especially upper thoracic/suprascapular/distal cervical) present. Normal range of motion.      Cervical back: Neck supple.   Lymphadenopathy:      Cervical: No cervical adenopathy.   Skin:     General: Skin is warm and dry.      Findings: No rash.   Neurological:      Mental Status: She is alert and oriented to person, place, and time.      Motor: No abnormal muscle tone.      Deep Tendon Reflexes: Reflexes are normal and symmetric.   Psychiatric:         Thought Content: Thought content normal.         Judgment: Judgment normal.            Results for orders placed or performed in visit on 08/24/22   INR point of care     Status: Abnormal   Result Value Ref Range    INR 2.0 (H) 0.9 - 1.1    Narrative    This test is intended for monitoring Coumadin therapy. Results are not accurate in patients with  prolonged INR due to factor deficiency.                   .  ..

## 2022-08-24 NOTE — PROGRESS NOTES
ANTICOAGULATION MANAGEMENT     Mary Kay Tejada 72 year old female is on warfarin with therapeutic INR result. (Goal INR 2.0-3.0)    Recent labs: (last 7 days)     08/24/22  0951   INR 2.0*       ASSESSMENT     Source(s): Chart Review and Patient/Caregiver Call     Warfarin doses taken: Warfarin taken as instructed  Diet: No new diet changes identified  New illness, injury, or hospitalization: No  Medication/supplement changes: None noted  Signs or symptoms of bleeding or clotting: No  Previous INR: Supratherapeutic  Additional findings: None       PLAN     Recommended plan for no diet, medication or health factor changes affecting INR     Dosing Instructions: Continue your current warfarin dose with next INR in 2 weeks       Summary  As of 8/24/2022    Full warfarin instructions:  2.5 mg every Mon, Wed, Sat; 5 mg all other days   Next INR check:  9/14/2022             Telephone call with Mary Kay who verbalizes understanding and agrees to plan    Patient offered & declined to schedule next visit    Education provided: None required    Plan made per [facility] anticoagulation protocol    Norberto Turk RN  Anticoagulation Clinic  8/24/2022    _______________________________________________________________________     Anticoagulation Episode Summary     Current INR goal:  2.0-3.0   TTR:  40.8 % (11.2 mo)   Target end date:  Indefinite   Send INR reminders to:  [facility]    Indications    Paroxysmal Atrial Fibrillation [I48.91]  Atrial fibrillation  unspecified type (H) [I48.91]           Comments:           Anticoagulation Care Providers     Provider Role Specialty Phone number    Cammy Bui MD Referring Family Medicine [phone]

## 2022-08-28 ASSESSMENT — ENCOUNTER SYMPTOMS
SHORTNESS OF BREATH: 0
FATIGUE: 1
ALLERGIC/IMMUNOLOGIC NEGATIVE: 1
POLYDIPSIA: 0
ABDOMINAL PAIN: 0
ARTHRALGIAS: 1
UNEXPECTED WEIGHT CHANGE: 0
NEUROLOGICAL NEGATIVE: 1
FEVER: 0
COUGH: 0
BRUISES/BLEEDS EASILY: 1
CHILLS: 0
HEMATOCHEZIA: 0

## 2022-08-30 ENCOUNTER — DOCUMENTATION ONLY (OUTPATIENT)
Dept: ANTICOAGULATION | Facility: CLINIC | Age: 72
End: 2022-08-30

## 2022-08-30 DIAGNOSIS — I48.91 ATRIAL FIBRILLATION, UNSPECIFIED TYPE (H): Primary | ICD-10-CM

## 2022-08-30 NOTE — PROGRESS NOTES
ANTICOAGULATION CLINIC REFERRAL RENEWAL REQUEST       An annual renewal order is required for all patients referred to [facility].?  Please review and sign the pended referral order for Mary Kay Tejada.       ANTICOAGULATION SUMMARY      Warfarin indication(s)   Atrial Fibrillation    Mechanical heart valve present?  NO       Current goal range   INR: 2.0-3.0     Goal appropriate for indication? Goal INR 2-3, standard for indication(s) above     Time in Therapeutic Range (TTR)  (Goal > 60%) 40.8%       Office visit with referring provider's group within last year yes on 8/24/22       Norberto Turk RN  [facility]

## 2022-08-31 ENCOUNTER — TELEPHONE (OUTPATIENT)
Dept: RESPIRATORY THERAPY | Facility: CLINIC | Age: 72
End: 2022-08-31

## 2022-08-31 NOTE — TELEPHONE ENCOUNTER
Spoke with Mary Kay, doing well, no questions for me to day. no issues getting and/or taking their medications, reviewed their action plan, in their green zone.  Reminded when we will call again and to call before if there is a question.  Luisa Matson, RT, CTTS, Chronic Pulmonary Disease Specialist

## 2022-09-02 ENCOUNTER — PATIENT OUTREACH (OUTPATIENT)
Dept: GERIATRIC MEDICINE | Facility: CLINIC | Age: 72
End: 2022-09-02

## 2022-09-02 NOTE — PROGRESS NOTES
[facility] Care Coordination Contact    Received after visit chart from care coordinator.  Completed following tasks: Mailed copy of care plan to client, Updated services in Database, Submitted referrals/auths for [employer] and wipes. Per CC request, Call [employer] was advise to call manufacture ([employer] [phone], [employer] [phone] and [employer] [phone]). Call and request for samples of pull ups in size S/M from all 3 manufacture to be mailed to member., Mailed copy of POC and PCA signature sheet for member to sign and return in SASE , Sent the following resources/forms: HCD-Serious Illness & Goals worksheet along with ACP Resources/ review   and Mailed [facility] Safe Medication Disposal   , Provider Signature - No POC Shared:  Member indicates that they do not want their POC shared with any EW providers.     and [employer]:  Faxed completed PCA assessment to PCA Agency and mailed copies to member.  Faxed MD Communication to PCP.  Emailed referral form for auth to [employer].    Esha Gerardo  Care Management Specialist  [facility]  [phone]

## 2022-09-02 NOTE — LETTER
[facility] Advance Care Planning     Mary Kay Tejada  [address] [place] [address]    Dear Mary Kay,     You shared with me your interest in receiving information on Advance Care Planning and Health Care Directives. Discussing and making decisions about this part of our health is very important.  A Health Care Directive is a written document that outlines your goals, values, beliefs and choices for health care and medical treatment in the event you are unable to speak for yourself.     We greatly value the opportunity to assist you in documenting your choices and to honor your   wishes. We ve enclosed HCD-Serious Illness & Goals worksheet along with ACP Resources/ review  to help you get started thinking about your values and goals. We have several options for additional resources:       Health Care Directives and Advance Care Planning resources can be viewed and printed   for free at our web site:  [email or web address].       Free group classes on Advance Care Planning and completing a Health Care Directive are available at multiple locations and times. These classes are led by trained staff who will provide information and guide you through a Health Care Directive.  They can also review, notarize and add your Health Care Directive to your medical record. [place] for a class at [email or web address] or by calling [employer] at [phone] or toll free [phone].      COPIES of completed Health Care Directives can be brought or mailed to any of our   locations, including the address listed below. You can also email a copy to [email or web address] .      Email or call me at the contact information listed below for questions, assistance, or to   make an appointment to discuss creating a Health Care Directive. You can also contact   our [facility] Department for questions or assistance.       Sincerely,     Samantha Alexandra RN PHN  Care Coordinator  [place]  [facility]  [phone]

## 2022-09-02 NOTE — LETTER
September 2, 2022    Important [employer] Information    MARY KAY HINDS  [address]  [facility] [address]  Your Care Plan  Dear Mary Kay,  When we spoke recently, I promised to send you a Care Plan. The plan enclosed is a summary of our discussion. It includes the steps we agreed would help you meet your health goals. In addition, I can help you with:  Plkgxvv-Z-[id number]  This program is available to members who need a ride to medical and dental visits. To schedule a ride, call [phone] or [phone] (toll free). TTY: 711. You can call 8 a.m. to 8 p.m. Seven days a week. Access to a representative may be limited at times.   One Pass  One Pass is your no-cost, complete, fitness solution for your mind and body. To learn more visit [employer]/fitness or call One Pass, toll-free [phone] (TTY: 710) 8 a.m. to 9 p.m. Monday-Friday.  Health Care Directive   This form helps you outline your health care wishes. You can request a form from me and I will answer any questions you have before you discuss it with your doctor.   Annual Physical  Take a key step on your path to good health and set up an annual physical at your clinic.  Questions?  Call me at   Monday-Friday between 8am and 5pm.  TTY: 711. As we discussed, I plan to be in touch with you again in 6 months to follow up via phone.  Sincerely,    Samantha Alexandra RN, PHN  [phone]  [email or web address]    cc: member records                                                                                             CB5 (Os) (5-2020)    Civil Rights Notice  Discrimination is against the law. [employer] does not discriminate on the basis of any of the following:    Race    Color    National Origin    Creed    Protestant    Age    Public Assistance Status    Receipt of Health Care Services    Disability (including physical or mental impairment)    Sex (including sex stereotypes and gender identity)    Marital Status    Political Beliefs    Medical  Condition    Genetic Information    Sexual Orientation    Claims Experience    Medical History    Health Status    Auxiliary Aids and Services:  [employer] provides auxiliary aids and services, like qualified interpreters or information in accessible formats, free of charge and in a timely manner, to ensure an equal opportunity to participate in our health care programs. Contact [employer] at [facility]/contact medicaid or call [phone] (toll free); TTY:71 or at [employer]/contactChinaNetCentercaid.    Language Assistance Services:  [employer] provides translated documents and spoken language interpreting, free of charge and in a timely manner, when language assistance services are necessary to ensure limited English speakers have meaningful access to our information and services. Contact [employer] at [phone] (toll free); TTY: 535 or [employer]/contactmedicaid.     Civil Rights Complaints  You have the right to file a discrimination complaint if you believe you were treated in a discriminatory way by [facility]. You may contact any of the following four agencies directly to file a discrimination complaint.    U.S. Department of Health and Human Services  Office for Civil Rights (OCR)  You have the right to file a complaint with the OCR, a federal agency, if you believe you have been discriminated against because of any of the following:    Race    Disability    Color    Sex    National Origin    Age    Lutheran (in some cases)    Contact the OCR directly to file a complaint:         Director         U.S. Department of Health and Human Services  Office for Civil Rights         [address] [place] [address]         Customer Response Center: Toll-free: [phone]          TDD: [phone]         Email: [email or web address]    [place] Department of Human Rights (MDHR)  In [place], you have the right to file a complaint with the [facility] if you believe you have been  discriminated against because of any of the following:      Race    Color    National Origin    Latter day    Creed    Sex    Sexual Orientation    Marital Status    Public Assistance Status    Disability    Contact the MD directly to file a complaint:         [facility] of Human Rights         [address] [place] [address]         [phone] (voice)          [phone] (toll free)         711 or [phone] ([place] Relay)         [phone] (Fax)         [email or web address] (Email)     [place] Department of Human Services (DHS)  You have the right to file a complaint with [facility] if you believe you have been discriminated against in our health care programs because of any of the following:    Race    Color    National Origin    Creed    Latter day    Age    Public Assistance Status    Receipt of Health Care Services    Disability (including physical or mental impairment)    Sex (including sex stereotypes and gender identity)    Marital Status    Political Beliefs    Medical Condition    Genetic Information    Sexual Orientation    Claims Experience    Medical History    Health Status    Complaints must be in writing and filed within 180 days of the date you discovered the alleged discrimination. The complaint must contain your name and address and describe the discrimination you are complaining about. After we get your complaint, we will review it and notify you in writing about whether we have authority to investigate. If we do, we will investigate the complaint.      [facility] will notify you in writing of the investigation s outcome. You have a right to appeal the outcome if you disagree with the decision. To appeal, you must send a written request to have [facility] review the investigation outcome. Be brief and state why you disagree with the decision. Include additional information you think is important.      If you file a complaint in this way, the people who work for  the agency named in the complaint cannot retaliate against you. This means they cannot punish you in any way for filing a complaint. Filing a complaint in this way does not stop you from seeking out other legal or administration actions.     Contact DHS directly to file a discrimination complaint:        Civil Rights Coordinator        [place] Department of Human Services        Equal Opportunity and Access Division        [address] [address]        [place] [address] [phone] (voice) or use your preferred relay service     [facility] Complaint Notice   You have the right to file a complaint with [employer] if you believe you have been discriminated against because of any of the following:       Medical condition    Health status    Receipt of health care services    Claims experience    Medical history    Genetic information    Disability (including mental or physical impairment)    Marital status    Age    Sex (including sex stereotypes and gender identity)    Sexual orientation    National origin    Race    Color    Yarsanism    Creed    Public assistance status    Political beliefs    You can file a complaint and ask for help in filing a complaint in person or by mail, phone, fax, or email at:     [facility] Civil Rights Coordinator  [facility]  [address], [place] [address] [phone] (voice and fax) or TTL:926  Email: [email or web address]    American Indians can begin or continue to use [facility] and [facility] ([facility]) clinics. We will not require prior approval or impose any conditions for you to get services at these clinics. For elders age 65 years and older this includes Elderly Waiver (EW) services accessed through the [facility]. If a doctor or other provider in a [facility] or [facility] clinic refers you to a provider in our network, we will not require you to see your primary care provider prior to the referral.

## 2022-09-07 NOTE — TELEPHONE ENCOUNTER
"Last Written Prescription Date:  3/25/22  Last Fill Quantity: 180,  # refills: 0   Last office visit provider:  5/25/22     Requested Prescriptions   Pending Prescriptions Disp Refills     metFORMIN (GLUCOPHAGE) 500 MG tablet [Pharmacy Med Name: METFORMIN 500MG TABLETS] 180 tablet 0     Sig: TAKE 1 TABLET(500 MG) BY MOUTH TWICE DAILY WITH MEALS       Biguanide Agents Passed - 6/20/2022  1:05 PM        Passed - Patient is age 10 or older        Passed - Patient has documented A1c within the specified period of time.     If HgbA1C is 8 or greater, it needs to be on file within the past 3 months.  If less than 8, must be on file within the past 6 months.     Recent Labs   Lab Test 12/24/21  0756   A1C 6.1*             Passed - Patient's CR is NOT>1.4 OR Patient's EGFR is NOT<45 within past 12 mos.     Recent Labs   Lab Test 03/27/22  0527 07/21/21  0911 11/11/20  1123   GFRESTIMATED >90   < > >60   GFRESTBLACK  --   --  >60    < > = values in this interval not displayed.       Recent Labs   Lab Test 03/27/22  0527   CR 0.65             Passed - Patient does NOT have a diagnosis of CHF.        Passed - Medication is active on med list        Passed - Patient is not pregnant        Passed - Patient has not had a positive pregnancy test within the past 12 mos.         Passed - Recent (6 mo) or future (30 days) visit within the authorizing provider's specialty     Patient had office visit in the last 6 months or has a visit in the next 30 days with authorizing provider or within the authorizing provider's specialty.  See \"Patient Info\" tab in inbasket, or \"Choose Columns\" in Meds & Orders section of the refill encounter.                 Quincy Dang RN 06/20/22 1:05 PM  " Incorrect data, not for this pt.

## 2022-09-08 DIAGNOSIS — E11.9 TYPE 2 DIABETES MELLITUS (H): ICD-10-CM

## 2022-09-09 NOTE — TELEPHONE ENCOUNTER
"Last Written Prescription Date:  6/20/22  Last Fill Quantity: 180,  # refills: 0   Last office visit provider:  8/24/22     Requested Prescriptions   Pending Prescriptions Disp Refills     metFORMIN (GLUCOPHAGE) 500 MG tablet [Pharmacy Med Name: METFORMIN 500MG TABLETS] 180 tablet 0     Sig: TAKE 1 TABLET(500 MG) BY MOUTH TWICE DAILY WITH MEALS       Biguanide Agents Passed - 9/9/2022  8:25 AM        Passed - Patient is age 10 or older        Passed - Patient has documented A1c within the specified period of time.     If HgbA1C is 8 or greater, it needs to be on file within the past 3 months.  If less than 8, must be on file within the past 6 months.     Recent Labs   Lab Test 07/25/22  1026   A1C 5.8*             Passed - Patient's CR is NOT>1.4 OR Patient's EGFR is NOT<45 within past 12 mos.     Recent Labs   Lab Test 07/25/22  1026 07/21/21  0911 11/11/20  1123   GFRESTIMATED >90   < > >60   GFRESTBLACK  --   --  >60    < > = values in this interval not displayed.       Recent Labs   Lab Test 07/25/22  1026   CR 0.51             Passed - Patient does NOT have a diagnosis of CHF.        Passed - Medication is active on med list        Passed - Patient is not pregnant        Passed - Patient has not had a positive pregnancy test within the past 12 mos.         Passed - Recent (6 mo) or future (30 days) visit within the authorizing provider's specialty     Patient had office visit in the last 6 months or has a visit in the next 30 days with authorizing provider or within the authorizing provider's specialty.  See \"Patient Info\" tab in inbasket, or \"Choose Columns\" in Meds & Orders section of the refill encounter.                 Quincy Dang RN 09/09/22 8:25 AM  "

## 2022-09-25 ENCOUNTER — HEALTH MAINTENANCE LETTER (OUTPATIENT)
Age: 72
End: 2022-09-25

## 2022-09-27 ENCOUNTER — LAB (OUTPATIENT)
Dept: LAB | Facility: CLINIC | Age: 72
End: 2022-09-27

## 2022-09-27 ENCOUNTER — ANTICOAGULATION THERAPY VISIT (OUTPATIENT)
Dept: ANTICOAGULATION | Facility: CLINIC | Age: 72
End: 2022-09-27

## 2022-09-27 ENCOUNTER — OFFICE VISIT (OUTPATIENT)
Dept: FAMILY MEDICINE | Facility: CLINIC | Age: 72
End: 2022-09-27
Payer: COMMERCIAL

## 2022-09-27 VITALS
SYSTOLIC BLOOD PRESSURE: 146 MMHG | HEART RATE: 87 BPM | WEIGHT: 154.5 LBS | OXYGEN SATURATION: 91 % | BODY MASS INDEX: 25.71 KG/M2 | TEMPERATURE: 98 F | DIASTOLIC BLOOD PRESSURE: 68 MMHG

## 2022-09-27 DIAGNOSIS — Z23 NEED FOR COVID-19 VACCINE: ICD-10-CM

## 2022-09-27 DIAGNOSIS — G89.4 CHRONIC PAIN SYNDROME: ICD-10-CM

## 2022-09-27 DIAGNOSIS — I48.91 ATRIAL FIBRILLATION, UNSPECIFIED TYPE (H): ICD-10-CM

## 2022-09-27 DIAGNOSIS — Z23 NEED FOR INFLUENZA VACCINATION: ICD-10-CM

## 2022-09-27 DIAGNOSIS — M77.8 TENDONITIS OF BOTH WRISTS: ICD-10-CM

## 2022-09-27 DIAGNOSIS — J44.1 COPD EXACERBATION (H): ICD-10-CM

## 2022-09-27 DIAGNOSIS — I48.91 ATRIAL FIBRILLATION (H): Primary | ICD-10-CM

## 2022-09-27 DIAGNOSIS — Z12.11 SCREEN FOR COLON CANCER: ICD-10-CM

## 2022-09-27 DIAGNOSIS — E11.42 DIABETIC POLYNEUROPATHY ASSOCIATED WITH TYPE 2 DIABETES MELLITUS (H): ICD-10-CM

## 2022-09-27 DIAGNOSIS — M79.7 FIBROMYALGIA: Primary | ICD-10-CM

## 2022-09-27 DIAGNOSIS — Z79.899 ENCOUNTER FOR LONG-TERM (CURRENT) USE OF MEDICATIONS: ICD-10-CM

## 2022-09-27 LAB — INR BLD: 2.4 (ref 0.9–1.1)

## 2022-09-27 PROCEDURE — 90662 IIV NO PRSV INCREASED AG IM: CPT | Performed by: FAMILY MEDICINE

## 2022-09-27 PROCEDURE — 91312 COVID-19,PF,PFIZER BOOSTER BIVALENT: CPT | Performed by: FAMILY MEDICINE

## 2022-09-27 PROCEDURE — G0008 ADMIN INFLUENZA VIRUS VAC: HCPCS | Performed by: FAMILY MEDICINE

## 2022-09-27 PROCEDURE — 99214 OFFICE O/P EST MOD 30 MIN: CPT | Mod: 25 | Performed by: FAMILY MEDICINE

## 2022-09-27 PROCEDURE — 20553 NJX 1/MLT TRIGGER POINTS 3/>: CPT | Performed by: FAMILY MEDICINE

## 2022-09-27 PROCEDURE — 85610 PROTHROMBIN TIME: CPT | Performed by: FAMILY MEDICINE

## 2022-09-27 PROCEDURE — 0124A COVID-19,PF,PFIZER BOOSTER BIVALENT: CPT | Performed by: FAMILY MEDICINE

## 2022-09-27 PROCEDURE — 36416 COLLJ CAPILLARY BLOOD SPEC: CPT | Performed by: FAMILY MEDICINE

## 2022-09-27 RX ORDER — OXYCODONE HYDROCHLORIDE 10 MG/1
10 TABLET ORAL EVERY 6 HOURS PRN
Qty: 120 TABLET | Refills: 0 | Status: SHIPPED | OUTPATIENT
Start: 2022-09-27 | End: 2022-10-31

## 2022-09-27 NOTE — PROGRESS NOTES
1. Fibromyalgia  Patient here for fibromyalgia, trigger point injections - this allows us to keep opiate use controlled as well.  See procedure note.    - lidocaine 1 % 10 mL  - MSK: Inject Trigger Point, 1 or 2  - oxyCODONE IR (ROXICODONE) 10 MG tablet; Take 1 tablet (10 mg) by mouth every 6 hours as needed for moderate to severe pain  Dispense: 120 tablet; Refill: 0    2. Chronic pain syndrome  As above - fibromyalgia with trigger point pain.    - oxyCODONE IR (ROXICODONE) 10 MG tablet; Take 1 tablet (10 mg) by mouth every 6 hours as needed for moderate to severe pain  Dispense: 120 tablet; Refill: 0    3. COPD exacerbation (H)  H/o COPD - currently stable.     4. Diabetic polyneuropathy associated with type 2 diabetes mellitus (H)  H/o diabetic polyneuropathy - stable.     5. Encounter for long-term (current) use of medications  Reviewed medications     6. Atrial fibrillation, unspecified type (H)  H/o atrial fib - takes long-term coumadin.    - INR point of care    7. Tendonitis of both wrists  H/o bilateral wrist tendonitis - desires replacement splints .    - Wrist/Arm/Hand Supplies Order for DME - ONLY FOR DME    8. Screen for colon cancer  Due for colon cancer screening - declines    9. Need for COVID-19 vaccine  Due for COVID booster - bivalent booster ordered  - COVID-19,PF,PFIZER BOOSTER BIVALENT (12+YRS)    10. Need for influenza vaccination  Due for influenza vaccine - ordered  - INFLUENZA, QUAD, HIGH DOSE, PF, 65YR + (FLUZONE HD)    [facility]    Procedure: MSK: Inject Trigger Point, 1 or 2    Date/Time: 9/27/2022 11:49 AM  Performed by: Cammy Bui MD  Authorized by: Cammy Bui MD       UNIVERSAL PROTOCOL   Site Marked: Yes  Prior Images Obtained and Reviewed:  NA  Required items: Required blood products, implants, devices and special equipment available (NA)    Patient identity confirmed:  Verbally with patient  NA - No sedation,  light sedation, or local anesthesia  Confirmation Checklist:  Patient's identity using two indicators  Time out: Immediately prior to the procedure a time out was called    Universal Protocol: the Joint Commission Universal Protocol was followed    Preparation: Patient was prepped and draped in usual sterile fashion      SEDATION    Patient Sedated: No      PROCEDURE  Describe Procedure: Consent obtained: verbal    Indication:  fibromyalgia    6 trigger points identified.  Each trigger point swabbed x 3 with Betadine swab.  Each trigger point then injected with 2 ml of 1% Lidocaine (no epi).    Total volume injected:  10 ml    Complications:  None, patient tolerated procedure well.    Plan:  Discussed care with patient.  RTC for further injections in 30 days prn.    Patient Tolerance:  Patient tolerated the procedure well with no immediate complications  Length of time physician/provider present for 1:1 monitoring during sedation: 0        Carol Muñiz is a 72 year old, presenting for the following health issues:  Imm/Inj (Trigger point shots) and Dme (Request for wrist braces that just cover the wrist with thumb)      History of Present Illness       Reason for visit:  Trigger point shots              Review of Systems   Constitutional: Negative for chills, fever and unexpected weight change.   HENT: Negative.    Eyes: Negative for visual disturbance.   Respiratory: Negative for cough and shortness of breath.    Cardiovascular: Negative for chest pain and peripheral edema.   Gastrointestinal: Negative for abdominal pain and hematochezia.   Endocrine: Negative for polydipsia and polyuria.   Breasts:  negative.    Genitourinary: Negative.    Musculoskeletal:        Chronic pain syndrome -   Arthritis (multiple joints)  Trigger points due to fibromyalgia   Skin: Negative.    Allergic/Immunologic: Negative.    Neurological: Negative.    Hematological: Negative.    Psychiatric/Behavioral: Negative.    All  other systems reviewed and are negative.           Objective    BP (!) 146/68 (BP Location: Right arm, Patient Position: Sitting, Cuff Size: Adult Small)   Pulse 87   Temp 98  F (36.7  C)   Wt 70.1 kg (154 lb 8 oz)   SpO2 91%   BMI 25.71 kg/m    Body mass index is 25.71 kg/m .  Physical Exam  Constitutional:       General: She is in acute distress.      Appearance: She is well-developed. She is ill-appearing (looks chronically ill).   HENT:      Right Ear: Tympanic membrane and external ear normal.      Left Ear: Tympanic membrane and external ear normal.      Nose: Nose normal.      Mouth/Throat:      Pharynx: No oropharyngeal exudate.   Eyes:      General:         Right eye: No discharge.         Left eye: No discharge.      Conjunctiva/sclera: Conjunctivae normal.      Pupils: Pupils are equal, round, and reactive to light.   Neck:      Thyroid: No thyromegaly.      Trachea: No tracheal deviation.   Cardiovascular:      Rate and Rhythm: Normal rate and regular rhythm.      Pulses: Normal pulses.      Heart sounds: Normal heart sounds, S1 normal and S2 normal. No murmur heard.    No friction rub. No S3 or S4 sounds.   Pulmonary:      Effort: Pulmonary effort is normal. No respiratory distress.      Breath sounds: Normal breath sounds. No wheezing or rales.   Abdominal:      General: Bowel sounds are normal.      Palpations: Abdomen is soft. There is no mass.      Tenderness: There is no abdominal tenderness.   Musculoskeletal:         General: Normal range of motion.      Cervical back: Neck supple.      Comments: Multiple trigger points at upper back/upper thoracic/suprascapular, lower cervical   Lymphadenopathy:      Cervical: No cervical adenopathy.   Skin:     General: Skin is warm and dry.      Findings: No rash.   Neurological:      Mental Status: She is alert and oriented to person, place, and time.      Motor: No abnormal muscle tone.      Deep Tendon Reflexes: Reflexes are normal and symmetric.    Psychiatric:         Thought Content: Thought content normal.         Judgment: Judgment normal.            Results for orders placed or performed in visit on 09/27/22   INR point of care     Status: Abnormal   Result Value Ref Range    INR 2.4 (H) 0.9 - 1.1    Narrative    This test is intended for monitoring Coumadin therapy. Results are not accurate in patients with prolonged INR due to factor deficiency.

## 2022-09-27 NOTE — PROGRESS NOTES
ANTICOAGULATION MANAGEMENT     Mary Kay Tejada 72 year old female is on warfarin with therapeutic INR result. (Goal INR 2.0-3.0)    Recent labs: (last 7 days)     09/27/22  1130   INR 2.4*       ASSESSMENT     Source(s): Chart Review and Patient/Caregiver Call     Warfarin doses taken: Warfarin taken as instructed  Diet: No new diet changes identified  New illness, injury, or hospitalization: No  Medication/supplement changes: None noted  Signs or symptoms of bleeding or clotting: No  Previous INR: Therapeutic last visit; previously outside of goal range  Additional findings: None       PLAN     Recommended plan for no diet, medication or health factor changes affecting INR     Dosing Instructions: Continue your current warfarin dose with next INR in 2 weeks       Summary  As of 9/27/2022    Full warfarin instructions:  2.5 mg every Mon, Wed, Sat; 5 mg all other days   Next INR check:  10/25/2022             Telephone call with Mary Kay who verbalizes understanding and agrees to plan    Patient offered & declined to schedule next visit    Education provided: None required    Plan made per [facility] anticoagulation protocol    Norberto Turk RN  Anticoagulation Clinic  9/27/2022    _______________________________________________________________________     Anticoagulation Episode Summary     Current INR goal:  2.0-3.0   TTR:  47.5 % (11.2 mo)   Target end date:  Indefinite   Send INR reminders to:  [facility]    Indications    Paroxysmal Atrial Fibrillation [I48.91]  Atrial fibrillation  unspecified type (H) [I48.91]           Comments:           Anticoagulation Care Providers     Provider Role Specialty Phone number    Cammy Bui MD Referring Family Medicine [phone]

## 2022-09-27 NOTE — PROGRESS NOTES
[facility] Care Coordination Contact    No Letter Received: 60 day tracking of letter complete, no letter received from member. Tracking discontinued.     Sanam Rashid  Care Management Specialist   [facility]   [phone]

## 2022-09-30 ENCOUNTER — TELEPHONE (OUTPATIENT)
Dept: RESPIRATORY THERAPY | Facility: CLINIC | Age: 72
End: 2022-09-30

## 2022-10-02 ASSESSMENT — ENCOUNTER SYMPTOMS
ALLERGIC/IMMUNOLOGIC NEGATIVE: 1
UNEXPECTED WEIGHT CHANGE: 0
FEVER: 0
SHORTNESS OF BREATH: 0
HEMATOCHEZIA: 0
CHILLS: 0
HEMATOLOGIC/LYMPHATIC NEGATIVE: 1
POLYDIPSIA: 0
PSYCHIATRIC NEGATIVE: 1
COUGH: 0
NEUROLOGICAL NEGATIVE: 1
ABDOMINAL PAIN: 0

## 2022-10-07 DIAGNOSIS — E11.649 TYPE 2 DIABETES MELLITUS WITH HYPOGLYCEMIA WITHOUT COMA, WITH LONG-TERM CURRENT USE OF INSULIN (H): ICD-10-CM

## 2022-10-07 DIAGNOSIS — Z79.4 TYPE 2 DIABETES MELLITUS WITH HYPOGLYCEMIA WITHOUT COMA, WITH LONG-TERM CURRENT USE OF INSULIN (H): ICD-10-CM

## 2022-10-07 RX ORDER — GABAPENTIN 600 MG/1
TABLET ORAL
Qty: 90 TABLET | Refills: 4 | Status: SHIPPED | OUTPATIENT
Start: 2022-10-07 | End: 2023-03-21

## 2022-10-07 NOTE — TELEPHONE ENCOUNTER
Routing refill request to provider for review/approval because:  Drug not on the G refill protocol     Last Written Prescription Date:  5/17/2022  Last Fill Quantity: 90,  # refills: 4   Last office visit provider:  9/27/2022     Requested Prescriptions   Pending Prescriptions Disp Refills     gabapentin (NEURONTIN) 600 MG tablet [Pharmacy Med Name: GABAPENTIN 600MG TABLETS] 90 tablet 4     Sig: TAKE 1 TABLET(600 MG) BY MOUTH THREE TIMES DAILY       There is no refill protocol information for this order          Nona Alcala RN 10/07/22 1:55 PM

## 2022-10-09 DIAGNOSIS — E78.2 MIXED HYPERLIPIDEMIA: ICD-10-CM

## 2022-10-09 DIAGNOSIS — E11.649 TYPE 2 DIABETES MELLITUS WITH HYPOGLYCEMIA WITHOUT COMA, WITHOUT LONG-TERM CURRENT USE OF INSULIN (H): ICD-10-CM

## 2022-10-09 RX ORDER — BLOOD SUGAR DIAGNOSTIC
STRIP MISCELLANEOUS
Qty: 300 STRIP | Refills: 1 | Status: SHIPPED | OUTPATIENT
Start: 2022-10-09 | End: 2023-04-20

## 2022-10-09 RX ORDER — ATORVASTATIN CALCIUM 20 MG/1
TABLET, FILM COATED ORAL
Qty: 90 TABLET | Refills: 2 | Status: SHIPPED | OUTPATIENT
Start: 2022-10-09 | End: 2023-06-19

## 2022-10-09 NOTE — TELEPHONE ENCOUNTER
"Last Written Prescription Date:  3/25/22  Last Fill Quantity: 90,  # refills: 2   Last office visit provider:  9/27/22     Requested Prescriptions   Pending Prescriptions Disp Refills     ONETOUCH VERIO IQ test strip [Pharmacy Med Name: ONE TOUCH VERIO TEST ST(NEW)100S] 300 strip 3     Sig: USE 1 EACH AS DIRECTED THREE TIMES DAILY AS NEEDED       Diabetic Supplies Protocol Passed - 10/9/2022  5:57 AM        Passed - Medication is active on med list        Passed - Patient is 18 years of age or older        Passed - Recent (6 mo) or future (30 days) visit within the authorizing provider's specialty     Patient had office visit in the last 6 months or has a visit in the next 30 days with authorizing provider.  See \"Patient Info\" tab in inbasket, or \"Choose Columns\" in Meds & Orders section of the refill encounter.               atorvastatin (LIPITOR) 20 MG tablet [Pharmacy Med Name: ATORVASTATIN 20MG TABLETS] 90 tablet 2     Sig: TAKE 1 TABLET(20 MG) BY MOUTH AT BEDTIME       Statins Protocol Passed - 10/9/2022  5:57 AM        Passed - LDL on file in past 12 months     Recent Labs   Lab Test 07/25/22  1026   LDL 53             Passed - No abnormal creatine kinase in past 12 months     No lab results found.             Passed - Recent (12 mo) or future (30 days) visit within the authorizing provider's specialty     Patient has had an office visit with the authorizing provider or a provider within the authorizing providers department within the previous 12 mos or has a future within next 30 days. See \"Patient Info\" tab in inbasket, or \"Choose Columns\" in Meds & Orders section of the refill encounter.              Passed - Medication is active on med list        Passed - Patient is age 18 or older        Passed - No active pregnancy on record        Passed - No positive pregnancy test in past 12 months             Daksha Siu RN 10/09/22 2:53 PM  "

## 2022-10-26 ENCOUNTER — PATIENT OUTREACH (OUTPATIENT)
Dept: GERIATRIC MEDICINE | Facility: CLINIC | Age: 72
End: 2022-10-26

## 2022-10-26 NOTE — PROGRESS NOTES
[facility] Care Coordination Contact     W, attempted to [facility] on annual dental exam.   No voice mail.      KIANNA Carbajal  [facility]  [phone]

## 2022-10-27 DIAGNOSIS — F17.200 TOBACCO USE DISORDER: ICD-10-CM

## 2022-10-27 DIAGNOSIS — R42 VERTIGO: ICD-10-CM

## 2022-10-28 RX ORDER — NICOTINE 21 MG/24HR
PATCH, TRANSDERMAL 24 HOURS TRANSDERMAL
Qty: 28 PATCH | Refills: 0 | OUTPATIENT
Start: 2022-10-28

## 2022-10-28 RX ORDER — MECLIZINE HYDROCHLORIDE 25 MG/1
TABLET ORAL
Qty: 45 TABLET | Refills: 5 | Status: SHIPPED | OUTPATIENT
Start: 2022-10-28 | End: 2023-12-26

## 2022-10-28 NOTE — TELEPHONE ENCOUNTER
"Last Written Prescription Date:  7/21/21  Last Fill Quantity: 45,  # refills: 5   Last office visit provider:  9/27/22     Requested Prescriptions   Pending Prescriptions Disp Refills     meclizine (ANTIVERT) 25 MG tablet [Pharmacy Med Name: MECLIZINE 25MG RX TABLETS] 45 tablet 5     Sig: TAKE 1 TABLET(25 MG) BY MOUTH THREE TIMES DAILY AS NEEDED FOR DIZZINESS OR NAUSEA        Antivertigo/Antiemetic Agents Passed - 10/28/2022  8:30 AM        Passed - Recent (12 mo) or future (30 days) visit within the authorizing provider's specialty     Patient has had an office visit with the authorizing provider or a provider within the authorizing providers department within the previous 12 mos or has a future within next 30 days. See \"Patient Info\" tab in inbasket, or \"Choose Columns\" in Meds & Orders section of the refill encounter.              Passed - Medication is active on med list        Passed - Patient is 18 years of age or older         Refused Prescriptions Disp Refills     nicotine (NICODERM CQ) 21 MG/24HR 24 hr patch [Pharmacy Med Name: NICOTINE 21MG/24H PATCH 14S] 28 patch 0     Sig: APPLY 1 PATCH TOPICALLY TO THE SKIN DAILY       Partial Cholinergic Nicotinic Agonist Agents Failed - 10/27/2022  9:08 AM        Failed - Blood pressure under 140/90 in past 12 months     BP Readings from Last 3 Encounters:   09/27/22 (!) 146/68   08/24/22 135/61   07/25/22 102/62                 Failed - Medication is active on med list        Passed - Recent (12 mo) or future (30 days) visit within the authorizing provider's specialty     Patient has had an office visit with the authorizing provider or a provider within the authorizing providers department within the previous 12 mos or has a future within next 30 days. See \"Patient Info\" tab in inbasket, or \"Choose Columns\" in Meds & Orders section of the refill encounter.              Passed - Patient is 18 years of age or older        Passed - Patient is not pregnant        Passed " - No positive pregnancy test on file in past 12 months             Quincy Dang RN 10/28/22 8:32 AM

## 2022-10-28 NOTE — TELEPHONE ENCOUNTER
Outpatient Medication Detail     Disp Refills Start End IQRA   nicotine (NICODERM CQ) 21 MG/24HR 24 hr patch (Discontinued) 28 patch 0 12/14/2021 3/26/2022 No   Sig: APPLY 1 PATCH EXTERNALLY TO THE SKIN DAILY   Sent to pharmacy as: Nicotine 21 MG/24HR Transdermal Patch 24 Hour (NICODERM CQ)   Class: E-Prescribe   Reason for Discontinue: Medication Reconciliation Clean Up   Order: [id number]   E-Prescribing Status: Receipt confirmed by pharmacy (12/14/2021  7:44 PM CST)

## 2022-10-28 NOTE — TELEPHONE ENCOUNTER
Outpatient Medication Detail     Disp Refills Start End IQRA   nicotine (NICODERM CQ) 21 MG/24HR 24 hr patch (Discontinued) 28 patch 0 12/14/2021 3/26/2022 No   Sig: APPLY 1 PATCH EXTERNALLY TO THE SKIN DAILY   Sent to pharmacy as: Nicotine 21 MG/24HR Transdermal Patch 24 Hour (NICODERM CQ)   Class: E-Prescribe   Reason for Discontinue: Medication Reconciliation Clean Up   Order: [id number]   E-Prescribing Status: Receipt confirmed by pharmacy (12/14/2021  7:44 PM CST)

## 2022-10-31 ENCOUNTER — ANTICOAGULATION THERAPY VISIT (OUTPATIENT)
Dept: ANTICOAGULATION | Facility: CLINIC | Age: 72
End: 2022-10-31

## 2022-10-31 ENCOUNTER — OFFICE VISIT (OUTPATIENT)
Dept: FAMILY MEDICINE | Facility: CLINIC | Age: 72
End: 2022-10-31
Payer: COMMERCIAL

## 2022-10-31 ENCOUNTER — LAB (OUTPATIENT)
Dept: LAB | Facility: CLINIC | Age: 72
End: 2022-10-31
Payer: COMMERCIAL

## 2022-10-31 VITALS
RESPIRATION RATE: 24 BRPM | HEART RATE: 65 BPM | TEMPERATURE: 98.4 F | OXYGEN SATURATION: 91 % | SYSTOLIC BLOOD PRESSURE: 142 MMHG | DIASTOLIC BLOOD PRESSURE: 54 MMHG

## 2022-10-31 DIAGNOSIS — Z12.11 SCREEN FOR COLON CANCER: ICD-10-CM

## 2022-10-31 DIAGNOSIS — Z79.899 ENCOUNTER FOR LONG-TERM (CURRENT) USE OF MEDICATIONS: ICD-10-CM

## 2022-10-31 DIAGNOSIS — I48.91 ATRIAL FIBRILLATION, UNSPECIFIED TYPE (H): Primary | ICD-10-CM

## 2022-10-31 DIAGNOSIS — J44.1 COPD EXACERBATION (H): ICD-10-CM

## 2022-10-31 DIAGNOSIS — E11.42 DIABETIC POLYNEUROPATHY ASSOCIATED WITH TYPE 2 DIABETES MELLITUS (H): ICD-10-CM

## 2022-10-31 DIAGNOSIS — M54.6 TRIGGER POINT OF THORACIC REGION: ICD-10-CM

## 2022-10-31 DIAGNOSIS — M79.7 FIBROMYALGIA: Primary | ICD-10-CM

## 2022-10-31 DIAGNOSIS — F17.210 CIGARETTE NICOTINE DEPENDENCE WITHOUT COMPLICATION: ICD-10-CM

## 2022-10-31 DIAGNOSIS — I48.91 ATRIAL FIBRILLATION, UNSPECIFIED TYPE (H): ICD-10-CM

## 2022-10-31 DIAGNOSIS — G89.4 CHRONIC PAIN SYNDROME: ICD-10-CM

## 2022-10-31 LAB — INR BLD: 2.6 (ref 0.9–1.1)

## 2022-10-31 PROCEDURE — 20553 NJX 1/MLT TRIGGER POINTS 3/>: CPT | Performed by: FAMILY MEDICINE

## 2022-10-31 PROCEDURE — 85610 PROTHROMBIN TIME: CPT

## 2022-10-31 PROCEDURE — 99214 OFFICE O/P EST MOD 30 MIN: CPT | Mod: 25 | Performed by: FAMILY MEDICINE

## 2022-10-31 PROCEDURE — 36416 COLLJ CAPILLARY BLOOD SPEC: CPT

## 2022-10-31 RX ORDER — IPRATROPIUM BROMIDE AND ALBUTEROL SULFATE 2.5; .5 MG/3ML; MG/3ML
3 SOLUTION RESPIRATORY (INHALATION) EVERY 4 HOURS PRN
Qty: 90 ML | Refills: 3 | Status: ON HOLD | OUTPATIENT
Start: 2022-10-31 | End: 2023-06-05

## 2022-10-31 RX ORDER — OXYCODONE HYDROCHLORIDE 10 MG/1
10 TABLET ORAL EVERY 6 HOURS PRN
Qty: 120 TABLET | Refills: 0 | Status: SHIPPED | OUTPATIENT
Start: 2022-10-31 | End: 2022-12-27

## 2022-10-31 RX ORDER — NICOTINE 21 MG/24HR
1 PATCH, TRANSDERMAL 24 HOURS TRANSDERMAL EVERY 24 HOURS
Qty: 28 PATCH | Refills: 1 | Status: SHIPPED | OUTPATIENT
Start: 2022-10-31 | End: 2023-01-11

## 2022-10-31 NOTE — PROGRESS NOTES
Has cologuard box - hasn't done it yet  Will make eye appointment      1. Fibromyalgia  2. Trigger point of thoracic region  This patient has longstanding trigger point pain with fibromyalgia - managed with monthly injections to keep other pain medications at minimum.      [facility]    Procedure: MSK: Inject Trigger Points, >3    Date/Time: 10/31/2022 11:50 AM  Performed by: Cammy Bui MD  Authorized by: Cammy Bui MD       UNIVERSAL PROTOCOL   Site Marked: Yes  Prior Images Obtained and Reviewed:  No  Required items: Required blood products, implants, devices and special equipment available    Patient identity confirmed:  Verbally with patient  NA - No sedation, light sedation, or local anesthesia  Confirmation Checklist:  Relevant allergies and patient's identity using two indicators  Time out: Immediately prior to the procedure a time out was called    Universal Protocol: the Joint Commission Universal Protocol was followed    Preparation: Patient was prepped and draped in usual sterile fashion    ESBL (mL):  0    SEDATION    Patient Sedated: No      PROCEDURE  Describe Procedure: Patient has multiple trigger points - 6 trigger points identified and injected with 1% Lidocaine (NO Epi).  Total of 1.6 ml injected at each point - all trigger points in lower cervical/upper thoracic.    Length of time physician/provider present for 1:1 monitoring during sedation: 0    - oxyCODONE IR (ROXICODONE) 10 MG tablet; Take 1 tablet (10 mg) by mouth every 6 hours as needed for moderate to severe pain  Dispense: 120 tablet; Refill: 0  - lidocaine 1 % 10 mL      3. COPD exacerbation (H)  Needs refill on Duonebs  - ipratropium - albuterol 0.5 mg/2.5 mg/3 mL (DUONEB) 0.5-2.5 (3) MG/3ML neb solution; Take 1 vial (3 mLs) by nebulization every 4 hours as needed for shortness of breath / dyspnea or wheezing  Dispense: 90 mL; Refill: 3    4. Diabetic polyneuropathy  associated with type 2 diabetes mellitus (H)  H/o type II DM -     5. Cigarette nicotine dependence without complication  Desires refill on Nicotine patch - trying to stop smoking   - nicotine (NICODERM CQ) 14 MG/24HR 24 hr patch; Place 1 patch onto the skin every 24 hours  Dispense: 28 patch; Refill: 1    6. Chronic pain syndrome  Due for refill on Oxycodone - takes to avoid NSAIds due to chronic GI bleeding   - oxyCODONE IR (ROXICODONE) 10 MG tablet; Take 1 tablet (10 mg) by mouth every 6 hours as needed for moderate to severe pain  Dispense: 120 tablet; Refill: 0    7. Screen for colon cancer  Due for colon cancer screening - she will ponder this!    8. Encounter for long-term (current) use of medications  Reviewed meds      Carol   Mary Kay is a 72 year old, presenting for the following health issues:  Injections, Medication Refill, and Recheck Medication      Has cologkatrina box - hasn't done it yet  Will make eye appointment    Here for trigger point injections           Review of Systems   Constitutional: Negative for chills, fever and unexpected weight change.   HENT: Negative.    Eyes: Negative for visual disturbance.   Respiratory: Negative for cough and shortness of breath.    Cardiovascular: Negative for chest pain and peripheral edema.   Gastrointestinal: Negative.  Negative for abdominal pain and hematochezia.   Endocrine: Negative for polydipsia and polyuria.   Genitourinary: Negative.    Musculoskeletal: Positive for arthralgias and myalgias.   Skin: Negative.    Allergic/Immunologic: Negative.    Neurological: Negative.    Psychiatric/Behavioral: Negative.    All other systems reviewed and are negative.           Objective    BP (!) 142/54 (BP Location: Right arm, Patient Position: Sitting, Cuff Size: Adult Small)   Pulse 65   Temp 98.4  F (36.9  C) (Oral)   Resp 24   SpO2 91%   There is no height or weight on file to calculate BMI.    Physical Exam  Vitals reviewed.   Constitutional:        Appearance: She is ill-appearing (chronically ill appearing - very thin).   HENT:      Head: Atraumatic.      Right Ear: Tympanic membrane normal.      Left Ear: Tympanic membrane normal.      Nose: No congestion.      Mouth/Throat:      Pharynx: No posterior oropharyngeal erythema.   Eyes:      Extraocular Movements: Extraocular movements intact.   Cardiovascular:      Rate and Rhythm: Normal rate and regular rhythm.      Pulses: Normal pulses.      Heart sounds: Normal heart sounds.   Pulmonary:      Effort: Pulmonary effort is normal.      Breath sounds: Normal breath sounds.   Abdominal:      Tenderness: There is no abdominal tenderness.   Musculoskeletal:         General: Tenderness (trigger point tenderness) and deformity present.      Cervical back: Tenderness (at posterior inferior cervical ) present.   Skin:     General: Skin is warm and dry.   Neurological:      General: No focal deficit present.   Psychiatric:         Mood and Affect: Mood normal.            Results for orders placed or performed in visit on 10/31/22   INR point of care     Status: Abnormal   Result Value Ref Range    INR 2.6 (H) 0.9 - 1.1    Narrative    This test is intended for monitoring Coumadin therapy. Results are not accurate in patients with prolonged INR due to factor deficiency.

## 2022-10-31 NOTE — PROGRESS NOTES
ANTICOAGULATION MANAGEMENT     Mary Kay Tejada 72 year old female is on warfarin with therapeutic INR result. (Goal INR 2.0-3.0)    Recent labs: (last 7 days)     10/31/22  1120   INR 2.6*       ASSESSMENT     Source(s): Chart Review and Patient/Caregiver Call     Warfarin doses taken: Warfarin taken as instructed  Diet: No new diet changes identified  New illness, injury, or hospitalization: No  Medication/supplement changes: None noted  Signs or symptoms of bleeding or clotting: No  Previous INR: Therapeutic last 2(+) visits  Additional findings: None       PLAN     Recommended plan for no diet, medication or health factor changes affecting INR     Dosing Instructions: Continue your current warfarin dose with next INR in 4 weeks       Summary  As of 10/31/2022    Full warfarin instructions:  2.5 mg every Mon, Wed, Sat; 5 mg all other days; Starting 10/31/2022   Next INR check:  11/30/2022             Telephone call with Mary Kay who verbalizes understanding and agrees to plan    Check at provider office visit 11/30    Education provided:   None required  Contact [phone] with any changes, questions or concerns.     Plan made per [facility] anticoagulation protocol    Norberto Turk RN  Anticoagulation Clinic  10/31/2022    _______________________________________________________________________     Anticoagulation Episode Summary     Current INR goal:  2.0-3.0   TTR:  52.3 % (11.2 mo)   Target end date:  Indefinite   Send INR reminders to:  [facility]    Indications    Paroxysmal Atrial Fibrillation [I48.91]  Atrial fibrillation  unspecified type (H) [I48.91]           Comments:           Anticoagulation Care Providers     Provider Role Specialty Phone number    Cammy Bui MD Referring Family Medicine [phone]

## 2022-11-02 ENCOUNTER — TELEPHONE (OUTPATIENT)
Dept: RESPIRATORY THERAPY | Facility: HOSPITAL | Age: 72
End: 2022-11-02

## 2022-11-02 NOTE — TELEPHONE ENCOUNTER
COPD Education follow up call:  No Answer/   Mail box full  Aamir Ross, RT, TTS, Chronic Pulmonary Disease Specialist

## 2022-11-06 ASSESSMENT — ENCOUNTER SYMPTOMS
GASTROINTESTINAL NEGATIVE: 1
NEUROLOGICAL NEGATIVE: 1
SHORTNESS OF BREATH: 0
POLYDIPSIA: 0
UNEXPECTED WEIGHT CHANGE: 0
PSYCHIATRIC NEGATIVE: 1
ALLERGIC/IMMUNOLOGIC NEGATIVE: 1
FEVER: 0
ABDOMINAL PAIN: 0
MYALGIAS: 1
ARTHRALGIAS: 1
CHILLS: 0
COUGH: 0
HEMATOCHEZIA: 0

## 2022-11-08 ENCOUNTER — TELEPHONE (OUTPATIENT)
Dept: FAMILY MEDICINE | Facility: CLINIC | Age: 72
End: 2022-11-08

## 2022-11-11 ENCOUNTER — TELEPHONE (OUTPATIENT)
Dept: FAMILY MEDICINE | Facility: CLINIC | Age: 72
End: 2022-11-11

## 2022-11-11 DIAGNOSIS — R05.1 ACUTE COUGH: ICD-10-CM

## 2022-11-11 DIAGNOSIS — J20.9 ACUTE BRONCHITIS, UNSPECIFIED ORGANISM: Primary | ICD-10-CM

## 2022-11-11 RX ORDER — BENZONATATE 200 MG/1
200 CAPSULE ORAL 3 TIMES DAILY PRN
Qty: 30 CAPSULE | Refills: 0 | Status: SHIPPED | OUTPATIENT
Start: 2022-11-11 | End: 2023-01-10

## 2022-11-11 RX ORDER — PREDNISONE 20 MG/1
40 TABLET ORAL DAILY
Qty: 10 TABLET | Refills: 0 | Status: SHIPPED | OUTPATIENT
Start: 2022-11-11 | End: 2022-11-16

## 2022-11-11 RX ORDER — CEFDINIR 300 MG/1
300 CAPSULE ORAL 2 TIMES DAILY
Qty: 20 CAPSULE | Refills: 0 | Status: SHIPPED | OUTPATIENT
Start: 2022-11-11 | End: 2023-01-10

## 2022-11-11 NOTE — TELEPHONE ENCOUNTER
"Writer spoke with pt regarding message below. Pt declines Nurse Triage. Would like PCP to prescribe: cough medication, Antibiotics, and Prednisone for her.    Pt last saw PCP on 10/31/2022.     Per pt, she has been having a cough last few days.  Having cough last few days. Pt does use oxygen and have inhalers at home. Pt does not have a fever. Sometimes, pt able to cough up yellow sputum. Pt has had these symptoms before, and PCP prescribed antibiotic, cough medication and prednisone. Pt also states the \"weather change\" may have played a role in her symptoms too. Pt's voice kind of raspy during call.    Pt would like to let PCP know, the last Abx she prescribed worked well with pt.    Routing message to PCP to review and advise.    MING Kinney, RN   [facility]    "

## 2022-11-11 NOTE — TELEPHONE ENCOUNTER
New Medication Request    Contacts       Type Contact Phone/Fax    11/11/2022 08:22 AM CST Phone (Incoming) Mary Kay Tejada (Self) [phone] (M)          What medication are you requesting?: Patient did not give name of medications. Patient request for antibiotic, cough medication, Prednisone.    Reason for medication request: cough, and possible bronchitis. Patient state patient forgot mention this to Dr. Brizuela at her last visit. Patient added that due to coughing to much with this weather, patient does not want coughs to turn into pneumonia.     Have you taken this medication before?: Yes: in the past.    Controlled Substance Agreement on file:   CSA -- Patient Level:     [Media Unavailable] Controlled Substance Agreement - Opioid - Scan on 12/16/2015         Patient offered an appointment? No    Preferred Pharmacy:   [id number] - [place], [place] - [address] AT [facility] [address]  [address]  [place] [address]  Phone: [phone] Fax: [phone]      Could we send this information to you in [facility] or would you prefer to receive a phone call?:   Patient would prefer a phone call   Okay to leave a detailed message?: Yes at Cell number on file:    Telephone Information:   Mobile [phone]

## 2022-11-14 NOTE — TELEPHONE ENCOUNTER
Pt called request if provider can change benzonatate (TESSALON) 200 MG capsule to liquid form as it is cheaper thru her insurance if in liquid form. Per pt, her insurance is charging her $50 for caps form and she can't afford it. Please look into request and send new order to pharmacy if applicable. Thank you.     Preferred Pharmacy:   [id number] [address] AT [facility] OF [address]

## 2022-11-15 NOTE — TELEPHONE ENCOUNTER
I didn't know this came as a liquid.  I don't see it as an option in the pharmacy list.  Can we figure this out?

## 2022-11-15 NOTE — TELEPHONE ENCOUNTER
RN called patient and relayed pharmacy staff's message.    RN did chart review and noticed patient was taking the following medication in the past.     Disp Refills Start End IQRA   guaiFENesin-codeine (ROBITUSSIN AC) 100-10 MG/5ML solution (Discontinued) 180 mL 0 5/25/2022 7/31/2022 No   Sig - Route: Take 5-10 mLs by mouth every 4 hours as needed for cough - Oral     Patient verbalized that was the one she has been on before and would like Dr. Brizuela to prescribe that.    RN will route this message to Dr. Brizuela to prescribe the ROBITUSSIN AC to patient.          Jacquelyn Mena RN  [facility]

## 2022-11-15 NOTE — TELEPHONE ENCOUNTER
RN called [facility] to see if Tessalon comes as liquid form.    According to pharmacy staff, the medication did not make as liquid form. Perhaps, patient is asking for different kind of cough medication.     RN will call patient to find out.        Jacquelyn Mena RN  [facility]

## 2022-11-16 ENCOUNTER — PATIENT OUTREACH (OUTPATIENT)
Dept: GERIATRIC MEDICINE | Facility: CLINIC | Age: 72
End: 2022-11-16

## 2022-11-16 RX ORDER — CODEINE PHOSPHATE AND GUAIFENESIN 10; 100 MG/5ML; MG/5ML
1-2 SOLUTION ORAL EVERY 4 HOURS PRN
Qty: 180 ML | Refills: 0 | Status: SHIPPED | OUTPATIENT
Start: 2022-11-16 | End: 2023-01-10

## 2022-11-16 NOTE — PROGRESS NOTES
[facility] Care Coordination Contact    Returned call to member per voice mail. member reports she received the samples for pull ups and did not work so will not need to order it. She would like to try pads. CC will email [facility] for samples. She has enough comfort bath wipes for now and would like to put order on hold. She will contact CC when refill is needed.    Request emailed to [facility] equipment.    Samantha Alexandra RN, PHN   [facility]  [phone]

## 2022-11-27 ENCOUNTER — NURSE TRIAGE (OUTPATIENT)
Dept: NURSING | Facility: CLINIC | Age: 72
End: 2022-11-27

## 2022-11-27 NOTE — TELEPHONE ENCOUNTER
Nurse Triage SBAR    Situation: COVID symptoms    Background: Patient, Mary Kay, calling. Consent: not needed.    Assessment: Pt reports symptoms started yesterday. She has a cough, runny nose, and chest pain with coughing. She denies worsened SOB.     She is high risk due to age and medical history.     Protocol Recommended Disposition: ED/PCP Triage or Virtual visit per care advice. RN advised a virtual UC visit for today.  If no virtual visits available, advised UC or ED today. Patient verbalized understanding and had no further questions. Call warm transferred to COVID tx scheduling line at [phone].     Soraya Thomas RN  [facility] Nurse Advisor    Reason for Disposition    Chest pain or pressure    Additional Information    Negative: SEVERE difficulty breathing (e.g., struggling for each breath, speaks in single words)    Negative: Difficult to awaken or acting confused (e.g., disoriented, slurred speech)    Negative: Bluish (or gray) lips or face now    Negative: Shock suspected (e.g., cold/pale/clammy skin, too weak to stand, low BP, rapid pulse)    Negative: Sounds like a life-threatening emergency to the triager    Negative: SEVERE or constant chest pain or pressure  (Exception: Mild central chest pain, present only when coughing.)    Negative: MODERATE difficulty breathing (e.g., speaks in phrases, SOB even at rest, pulse 100-120)    Negative: [1] Headache AND [2] stiff neck (can't touch chin to chest)    Negative: Oxygen level (e.g., pulse oximetry) 90 percent or lower    Protocols used: CORONAVIRUS (COVID-19) DIAGNOSED OR [id number] 1.18.2022

## 2022-11-27 NOTE — TELEPHONE ENCOUNTER
Son Heriberto is calling back regarding covid.  Heriberto has an appointment set for virtual visit tomorrow.  Heriberto today is asking if Mary Kay will need the medication today versus tomorrow.  FNA advised that patient needs medication within 7 days from start of symptoms.  Patient has been triaged previously.      Reason for Disposition    Caller has already spoken with another triager or PCP AND has further questions AND triager able to answer questions.    Additional Information    Negative: Caller has already spoken with the PCP and has no further questions.    Negative: Caller has already spoken with another triager and has no further questions.    Protocols used: NO CONTACT OR DUPLICATE CONTACT CALL-A-

## 2022-11-28 ENCOUNTER — VIRTUAL VISIT (OUTPATIENT)
Dept: URGENT CARE | Facility: CLINIC | Age: 72
End: 2022-11-28
Payer: COMMERCIAL

## 2022-11-28 DIAGNOSIS — U07.1 CLINICAL DIAGNOSIS OF COVID-19: Primary | ICD-10-CM

## 2022-11-28 PROCEDURE — 99213 OFFICE O/P EST LOW 20 MIN: CPT | Mod: CS

## 2022-11-28 NOTE — PROGRESS NOTES
"Mary Kay is a 72 year old who is being evaluated via a billable telephone visit.      Tested + 11/25.  Sx started on 11/24.  COVID vaccinated and boosted.  On warfarin-- contraindicated with Paxlovid.    What phone number would you like to be contacted at? cell  How would you like to obtain your AVS? MyChart    Assessment & Plan     Clinical diagnosis of COVID-19    - molnupiravir (LAGEVRIO) 200 MG capsule; Take 4 capsules (800 mg) by mouth every 12 hours for 5 days    COVID-19 positive patient.  Encounter for consideration of medication intervention. Patient does qualify for a prescription. Full discussion with patient including medication options, risks and benefits. Potential drug interactions reviewed with patient.     Treatment Planned Molnupiravir RX sent to [facility]     Defers monoclonal Ab infusion referral not wanting to drive to [place] tomorrow with projected snow.    Temporary change to home medications:  None     Estimated body mass index is 25.71 kg/m  as calculated from the following:    Height as of 7/25/22: 1.651 m (5' 5\").    Weight as of 9/27/22: 70.1 kg (154 lb 8 oz).  GFR Estimate   Date Value Ref Range Status   07/25/2022 >90 >60 mL/min/1.73m2 Final     Comment:     Effective December 21, 2021 eGFRcr in adults is calculated using the 2021 CKD-EPI creatinine equation which includes age and gender (Irina et al., NEJ, DOI: 10.1056/KKQBsi9808674)   11/11/2020 >60 >60 mL/min/1.73m2 Final     Cyndi Mejía MD  [facility]    Subjective   Mary Kay is a 72 year old, presenting for the following health issues:  No chief complaint on file.      HPI     Tested + 11/25.  Sx started on 11/24.  COVID vaccinated and boosted.  On warfarin-- contraindicated with Paxlovid.      Review of Systems   Constitutional, HEENT, cardiovascular, pulmonary, GI, , musculoskeletal, neuro, skin, endocrine and psych systems are negative, except as otherwise noted.    "   Objective           Vitals:  No vitals were obtained today due to virtual visit.    Physical Exam   healthy, alert and no distress  PSYCH: Alert and oriented times 3; coherent speech, normal   rate and volume, able to articulate logical thoughts, able   to abstract reason, no tangential thoughts, no hallucinations   or delusions  Her affect is normal and pleasant  RESP: No cough, no audible wheezing, able to talk in full sentences  Remainder of exam unable to be completed due to telephone visits                Phone call duration: 11 minutes

## 2022-11-29 ENCOUNTER — TELEPHONE (OUTPATIENT)
Dept: FAMILY MEDICINE | Facility: CLINIC | Age: 72
End: 2022-11-29

## 2022-11-29 NOTE — TELEPHONE ENCOUNTER
Writer did chart review, it appears pt already completed virtual visit for COVID treatment yesterday with Dr. Mejía. Pt was prescribed Molnupiravir as pt is currently on Warfarin--contraindicated with Paxlovid.    Writer routing message to PCP to review and advise on pt's medication question.    MELISSA KinneyN, RN   [facility]

## 2022-11-29 NOTE — TELEPHONE ENCOUNTER
General Call      Reason for Call:  Medical question     What are your questions or concerns: Pt called in, she stated that she has covid and was prescribed medication to help. Pt wants to make sure if it's ok for her to take the covid medication along with the rest of medications that she is currently taking. Please give pt a call back at your earliest convenient. Thanks!    Date of last appointment with provider: 9/27/22    Could we send this information to you in [place] or would you prefer to receive a phone call?:   Patient would prefer a phone call   Okay to leave a detailed message?: Yes at Home number on file [phone] (home)

## 2022-11-30 ENCOUNTER — TELEPHONE (OUTPATIENT)
Dept: RESPIRATORY THERAPY | Facility: CLINIC | Age: 72
End: 2022-11-30

## 2022-11-30 NOTE — TELEPHONE ENCOUNTER
Spoke with Mary Kay, doing better tested COVID positive Friday, had virtual visit for it on 11/28, prescription for oral medication to help, she picked it up but did not start as slip in bag told her to check with her provider that it does not interact with any of her medications. Mary Kay called her PCP office but has not heard back. Encouraged her to reach out to them again today. no issues getting and/or taking their medications, reviewed their action plan,  Reminded when we will call again and to call before if there is a question.  Luisa Matson, RT, CTTS, Chronic Pulmonary Disease Specialist

## 2022-12-01 NOTE — TELEPHONE ENCOUNTER
RN called patient to relay Dr. Brizuela's message.         Patient verbalized understanding but decided not to take the medication. She tested positive of COVID-19 on 11/25/2022      Patient stated that she was supposed to see Dr. Brizuela yesterday for her trigger point shot but can't come in due to COVID-19. Patient was rescheduled to 12/27/2022 and was wondering if she can come in sooner.       Patient prefers 11:20 am spot due to transportation issue.      RN will route this encounter to  to review and advise if it's possible to DB next week or so around 11:20 am slot.      Per patient, Ok to leave detailed message .        Jacquelyn Mena RN  [facility] [place]

## 2022-12-01 NOTE — TELEPHONE ENCOUNTER
This is why we ask a patient to have a virtual visit for these medications.  It appears the doctor who did her visit was very appropriate.  OK to take!

## 2022-12-07 ENCOUNTER — TELEPHONE (OUTPATIENT)
Dept: ANTICOAGULATION | Facility: CLINIC | Age: 72
End: 2022-12-07

## 2022-12-07 NOTE — TELEPHONE ENCOUNTER
Patient returned call, repeated message below, she states she has an appointment on 12-27-22 and she can not make it any sooner.          ANTICOAGULATION     Mary Kay Tejada is overdue for INR check.      Left message for patient to call and schedule lab appointment as soon as possible. If returning call, please schedule.     Pascale Sherwood RN

## 2022-12-08 NOTE — TELEPHONE ENCOUNTER
RN called patient to relay Dr. Brizuela's message below.     Patient stated that she will just keep the appointment she has made.     Appointments in Next    Dec 27, 2022 11:00 AM  (Arrive by 10:40 AM)  Provider Visit with Cammy Bui MD  [facility] ([facility] [phone]            Jacquelyn Mena RN  [facility] [place]

## 2022-12-21 DIAGNOSIS — K27.9 PEPTIC ULCER: ICD-10-CM

## 2022-12-27 ENCOUNTER — TELEPHONE (OUTPATIENT)
Dept: FAMILY MEDICINE | Facility: CLINIC | Age: 72
End: 2022-12-27

## 2022-12-27 ENCOUNTER — OFFICE VISIT (OUTPATIENT)
Dept: FAMILY MEDICINE | Facility: CLINIC | Age: 72
End: 2022-12-27
Payer: COMMERCIAL

## 2022-12-27 ENCOUNTER — ANTICOAGULATION THERAPY VISIT (OUTPATIENT)
Dept: ANTICOAGULATION | Facility: CLINIC | Age: 72
End: 2022-12-27

## 2022-12-27 ENCOUNTER — LAB (OUTPATIENT)
Dept: LAB | Facility: CLINIC | Age: 72
End: 2022-12-27
Payer: COMMERCIAL

## 2022-12-27 VITALS
TEMPERATURE: 98.4 F | HEART RATE: 114 BPM | OXYGEN SATURATION: 88 % | DIASTOLIC BLOOD PRESSURE: 69 MMHG | RESPIRATION RATE: 20 BRPM | SYSTOLIC BLOOD PRESSURE: 100 MMHG

## 2022-12-27 DIAGNOSIS — Z79.899 ENCOUNTER FOR LONG-TERM (CURRENT) USE OF MEDICATIONS: ICD-10-CM

## 2022-12-27 DIAGNOSIS — Z23 NEED FOR VACCINATION AGAINST STREPTOCOCCUS PNEUMONIAE: ICD-10-CM

## 2022-12-27 DIAGNOSIS — I48.91 ATRIAL FIBRILLATION, UNSPECIFIED TYPE (H): ICD-10-CM

## 2022-12-27 DIAGNOSIS — Z12.11 SCREEN FOR COLON CANCER: ICD-10-CM

## 2022-12-27 DIAGNOSIS — M79.7 FIBROMYALGIA: ICD-10-CM

## 2022-12-27 DIAGNOSIS — Z12.31 VISIT FOR SCREENING MAMMOGRAM: ICD-10-CM

## 2022-12-27 DIAGNOSIS — G89.4 CHRONIC PAIN SYNDROME: ICD-10-CM

## 2022-12-27 DIAGNOSIS — J44.1 COPD EXACERBATION (H): ICD-10-CM

## 2022-12-27 DIAGNOSIS — Z12.31 ENCOUNTER FOR SCREENING MAMMOGRAM FOR MALIGNANT NEOPLASM OF BREAST: ICD-10-CM

## 2022-12-27 DIAGNOSIS — I48.91 ATRIAL FIBRILLATION, UNSPECIFIED TYPE (H): Primary | ICD-10-CM

## 2022-12-27 DIAGNOSIS — E11.42 DIABETIC POLYNEUROPATHY ASSOCIATED WITH TYPE 2 DIABETES MELLITUS (H): ICD-10-CM

## 2022-12-27 DIAGNOSIS — M54.6 TRIGGER POINT OF THORACIC REGION: Primary | ICD-10-CM

## 2022-12-27 LAB — INR BLD: 5.7 (ref 0.9–1.1)

## 2022-12-27 PROCEDURE — 20553 NJX 1/MLT TRIGGER POINTS 3/>: CPT | Performed by: FAMILY MEDICINE

## 2022-12-27 PROCEDURE — 90677 PCV20 VACCINE IM: CPT | Performed by: FAMILY MEDICINE

## 2022-12-27 PROCEDURE — 85610 PROTHROMBIN TIME: CPT

## 2022-12-27 PROCEDURE — G0009 ADMIN PNEUMOCOCCAL VACCINE: HCPCS | Performed by: FAMILY MEDICINE

## 2022-12-27 PROCEDURE — 36415 COLL VENOUS BLD VENIPUNCTURE: CPT

## 2022-12-27 PROCEDURE — 99214 OFFICE O/P EST MOD 30 MIN: CPT | Mod: 25 | Performed by: FAMILY MEDICINE

## 2022-12-27 RX ORDER — OXYCODONE HYDROCHLORIDE 10 MG/1
10 TABLET ORAL EVERY 6 HOURS PRN
Qty: 120 TABLET | Refills: 0 | Status: SHIPPED | OUTPATIENT
Start: 2022-12-27 | End: 2023-02-06

## 2022-12-27 RX ORDER — PREDNISONE 20 MG/1
40 TABLET ORAL DAILY
Qty: 10 TABLET | Refills: 0 | Status: SHIPPED | OUTPATIENT
Start: 2022-12-27 | End: 2023-01-01

## 2022-12-27 ASSESSMENT — PATIENT HEALTH QUESTIONNAIRE - PHQ9: SUM OF ALL RESPONSES TO PHQ QUESTIONS 1-9: 3

## 2022-12-27 NOTE — PROGRESS NOTES
"ANTICOAGULATION  MANAGEMENT    Mary Kay Tejada is on warfarin and had a point of care INR result > 5.5 or an \"error message\" on point of care meter today.      Mary Kay via phone  reviewed triage questions for potential signs and symptoms of bleeding.      Patient Response     Have you had any bleeding in the last week?   NO   Have you passed any red, black, or tarry stools in last week?   NO   Have you vomited/spit up any red or coffee ground material in last week?   NO   Have you had any new, severe abdominal pain or bloating develop in last week?   NO   Have you fallen or had any injuries in last week?   NO   Do you currently have a severe, sudden onset headache?   NO   Do you currently have any severe sudden changes in your vision?   NO   Do you currently have any new onset numbness, weakness/paralysis?   NO     Assessment/Plan:     Mary Kay's responses were negative for signs and symptoms of bleeding; may discharge from clinic    Mary Kay instructed to:       Hold warfarin today and tomorrow    Seek medical attention for new signs and symptoms of bleeding or a fall with injury.       "

## 2022-12-27 NOTE — TELEPHONE ENCOUNTER
The pt's insurance does not cover Omeprazole, would you like to start a PA or send in an alternative medication?

## 2022-12-27 NOTE — PROGRESS NOTES
1. Trigger point of thoracic region  2. Fibromyalgia  3. Chronic pain syndromeThis is a 71 yo female here for trigger point injections for fibromyalgia/chronic pain - this has benefited patient in the past - with ability to decrease use of other medications for pain.    - lidocaine 1 % 10 mL  - MSK: Inject Trigger Points, >3  - oxyCODONE IR (ROXICODONE) 10 MG tablet; Take 1 tablet (10 mg) by mouth every 6 hours as needed for moderate to severe pain  Dispense: 120 tablet; Refill: 0  - MSK: Inject Trigger Points, >3      [facility]    Procedure: MSK: Inject Trigger Points, >3    Date/Time: 12/27/2022 11:24 AM  Performed by: Cammy Bui MD  Authorized by: Cammy Bui MD       UNIVERSAL PROTOCOL   Site Marked: Yes  Prior Images Obtained and Reviewed:  NA  Required items: Required blood products, implants, devices and special equipment available    Patient identity confirmed:  Verbally with patient  Patient was reevaluated immediately before administering moderate or deep sedation or anesthesia  Confirmation Checklist:  Patient's identity using two indicators  Time out: Immediately prior to the procedure a time out was called    Universal Protocol: the Joint Commission Universal Protocol was followed    Preparation: Patient was prepped and draped in usual sterile fashion    ESBL (mL):  0    SEDATION    Patient Sedated: No      PROCEDURE  Describe Procedure: Procedure Note - Trigger Point Injections    Consent obtained: verbal  Indication:  Fibromyalgia/chronic pain     6 trigger points identified.  Each trigger point swabbed x 3 with Betadine swab.  Each trigger point then injected with 1.6ml of 1% Lidocaine (no epi).    Total volume injected:  10 ml    Complications:  None, patient tolerated procedure well.    Plan:  Discussed care with patient.  RTC for further injections in 30 days prn.    Patient Tolerance:  Patient tolerated the procedure well with no  "immediate complications  Length of time physician/provider present for 1:1 monitoring during sedation: 0      4. Diabetic polyneuropathy associated with type 2 diabetes mellitus (H)  Patient has type II DM - reports sugars are \"okay\"    5. COPD exacerbation (H)  Patient with recent COPD exacerbation - \"I was really sick\" - worries about getting medication in timely manner - discussed staying a prescription ahead on her steroids.    - predniSONE (DELTASONE) 20 MG tablet; Take 2 tablets (40 mg) by mouth daily for 5 days  Dispense: 10 tablet; Refill: 0    6. Screen for colon cancer  Due for colon cancer screening - discussed    7. Encounter for long-term (current) use of medications  Reviewed medications -     8. Visit for screening mammogram  9. Encounter for screening mammogram for malignant neoplasm of breast  Due for breast cancer screening - discussed mammogram screening     10. Need for vaccination against Streptococcus pneumoniae  Due for pneumococcal vaccine - ordered   - PNEUMOCOCCAL 20 VALENT CONJUGATE (PREVNAR 20)      Carol Muñiz is a 72 year old, presenting for the following health issues:  Trigger Point Injection      Not smoking like she was - \"too sick\"  Cannot smoke in the house     Feeling way better      History of Present Illness       Reason for visit:  Trigger point injections and med refills              Review of Systems   Constitutional: Negative for chills and fever.   HENT: Negative.    Eyes: Negative for visual disturbance.   Respiratory: Positive for cough and shortness of breath (baseline).    Cardiovascular: Negative for chest pain and peripheral edema.   Endocrine: Negative for polydipsia and polyuria.   Genitourinary: Negative.    Musculoskeletal:        Pain - all over - but especially at base of posterior neck -    Allergic/Immunologic: Negative.    Neurological: Positive for weakness.   All other systems reviewed and are negative.           Objective    /69 (BP " Location: Left arm, Patient Position: Sitting, Cuff Size: Adult Regular)   Pulse 114   Temp 98.4  F (36.9  C) (Temporal)   Resp 20   SpO2 (!) 88%   There is no height or weight on file to calculate BMI.  Physical Exam  Vitals reviewed.   Constitutional:       General: She is not in acute distress.     Appearance: Normal appearance.   HENT:      Head: Normocephalic.      Right Ear: Tympanic membrane, ear canal and external ear normal.      Left Ear: Tympanic membrane, ear canal and external ear normal.      Nose: Nose normal.      Mouth/Throat:      Mouth: Mucous membranes are moist.      Pharynx: No posterior oropharyngeal erythema.   Eyes:      Extraocular Movements: Extraocular movements intact.      Conjunctiva/sclera: Conjunctivae normal.      Pupils: Pupils are equal, round, and reactive to light.   Cardiovascular:      Rate and Rhythm: Normal rate and regular rhythm.      Pulses: Normal pulses.      Heart sounds: Normal heart sounds. No murmur heard.  Pulmonary:      Effort: Pulmonary effort is normal.      Breath sounds: Normal breath sounds.   Abdominal:      Palpations: Abdomen is soft. There is no mass.      Tenderness: There is no abdominal tenderness. There is no guarding or rebound.   Genitourinary:     Comments: External genitalia normal  Musculoskeletal:         General: No deformity. Normal range of motion.      Cervical back: Normal range of motion and neck supple.      Comments: Tender to palpation at trigger points at base of neck -    Lymphadenopathy:      Cervical: No cervical adenopathy.   Skin:     General: Skin is warm and dry.   Neurological:      General: No focal deficit present.      Mental Status: She is alert.   Psychiatric:         Mood and Affect: Mood normal.         Behavior: Behavior normal.              Results for orders placed or performed in visit on 12/27/22   INR point of care     Status: Abnormal   Result Value Ref Range    INR 5.7 (HH) 0.9 - 1.1    Narrative    This  test is intended for monitoring Coumadin therapy. Results are not accurate in patients with prolonged INR due to factor deficiency.

## 2022-12-27 NOTE — PROGRESS NOTES
ANTICOAGULATION MANAGEMENT     Mary Kay Tejada 72 year old female is on warfarin with supratherapeutic INR result. (Goal INR 2.0-3.0)    Recent labs: (last 7 days)     12/27/22  1024   INR 5.7*       ASSESSMENT     Source(s): Chart Review and Patient/Caregiver Call     Warfarin doses taken: Warfarin taken as instructed  Diet: No new diet changes identified  New illness, injury, or hospitalization: No  Medication/supplement changes: None noted finished course of prednisone 3 days ago. Had trigger point injections today  Signs or symptoms of bleeding or clotting: No, triage questions below.  Knows to be seen for active bleeding or head injury  Previous INR: Therapeutic last 2(+) visits  Additional findings: None       PLAN     Recommended plan for temporary change(s) affecting INR     Dosing Instructions: hold 2 doses then continue your current warfarin dose with next INR in 2 days       Summary  As of 12/27/2022    Full warfarin instructions:  12/27: Hold; 12/28: Hold; Otherwise 2.5 mg every Mon, Wed, Sat; 5 mg all other days   Next INR check:  12/29/2022             Telephone call with Mary Kay who verbalizes understanding and agrees to plan    Patient offered & declined to schedule next visit, needs to check with son for trans    Education provided:   Contact [phone] with any changes, questions or concerns.     Plan made per [facility] anticoagulation protocol    Norberto Turk RN  Anticoagulation Clinic  12/27/2022    _______________________________________________________________________     Anticoagulation Episode Summary     Current INR goal:  2.0-3.0   TTR:  39.4 % (11.2 mo)   Target end date:  Indefinite   Send INR reminders to:  [facility]    Indications    Paroxysmal Atrial Fibrillation [I48.91]  Atrial fibrillation  unspecified type (H) [I48.91]           Comments:           Anticoagulation Care Providers     Provider Role Specialty Phone number    Cammy Bui MD Referring  Family Medicine [phone]

## 2022-12-28 NOTE — TELEPHONE ENCOUNTER
I would try a PA - as patient has had chronic recurrent GI bleeding - high risk patient.  I would like her to continue her Omeprazole because of this bleeding.

## 2022-12-28 NOTE — TELEPHONE ENCOUNTER
Central Prior Authorization Team   Phone: [phone]    PA Initiation    Medication: Omeprazole  Insurance Company: [employer] - Phone [phone] Fax [phone]  [facility]arm[facility] the [facility]: [id number] - [place], [place] - [address] AT [facility] [address]  Filling Pharmacy Phone: [phone]  Filling Pharmacy Fax:    Start Date: 12/28/2022

## 2022-12-29 NOTE — TELEPHONE ENCOUNTER
Prior Authorization Approval    Authorization Effective Date: 11/28/2022  Authorization Expiration Date: 12/27/2025  Medication: Omeprazole  Approved Dose/Quantity:    Reference #:     Insurance Company: [employer] - Phone [phone] Fax [phone]  Expected CoPay:       CoPay Card Available:      Foundation Assistance Needed:    Which Pharmacy is filling the prescription (Not needed for infusion/clinic administered): [id number] - [facility] [address] [place] [facility] [address]  Pharmacy Notified: Yes  Patient Notified: NoComment:  Pharmacy will notify patient when Rx is ready for pickup

## 2022-12-30 ENCOUNTER — ANTICOAGULATION THERAPY VISIT (OUTPATIENT)
Dept: ANTICOAGULATION | Facility: CLINIC | Age: 72
End: 2022-12-30

## 2022-12-30 ENCOUNTER — DOCUMENTATION ONLY (OUTPATIENT)
Dept: OTHER | Facility: CLINIC | Age: 72
End: 2022-12-30

## 2022-12-30 ENCOUNTER — LAB (OUTPATIENT)
Dept: LAB | Facility: CLINIC | Age: 72
End: 2022-12-30
Payer: COMMERCIAL

## 2022-12-30 ENCOUNTER — TELEPHONE (OUTPATIENT)
Dept: RESPIRATORY THERAPY | Facility: CLINIC | Age: 72
End: 2022-12-30

## 2022-12-30 DIAGNOSIS — I48.91 ATRIAL FIBRILLATION, UNSPECIFIED TYPE (H): Primary | ICD-10-CM

## 2022-12-30 DIAGNOSIS — I48.91 ATRIAL FIBRILLATION, UNSPECIFIED TYPE (H): ICD-10-CM

## 2022-12-30 LAB — INR BLD: 2 (ref 0.9–1.1)

## 2022-12-30 PROCEDURE — 85610 PROTHROMBIN TIME: CPT

## 2022-12-30 PROCEDURE — 36416 COLLJ CAPILLARY BLOOD SPEC: CPT

## 2022-12-30 NOTE — PROGRESS NOTES
ANTICOAGULATION MANAGEMENT     Mary Kay Tejada 72 year old female is on warfarin with therapeutic INR result. (Goal INR 2.0-3.0)    Recent labs: (last 7 days)     12/30/22  1122   INR 2.0*       ASSESSMENT     Source(s): Chart Review and Patient/Caregiver Call     Warfarin doses taken: Held for 2 days  recently which may be affecting INR   Held warfarin on 12/27-28.  Diet: No new diet changes identified  New illness, injury, or hospitalization: No   Reported she had prednisone RX for her Fibromyalgia.  Has recovered from Covid, but has residual cough and tiredness; was treated with Molnupiravir for 5 days.  Medication/supplement changes: None noted  Signs or symptoms of bleeding or clotting: No  Previous INR: Supratherapeutic at 5.7 on 12/27/22.  Additional findings: None       PLAN     Recommended plan for temporary change(s) affecting INR     Dosing Instructions: Continue your current warfarin dose with next INR in 1-2 weeks       Summary  As of 12/30/2022    Full warfarin instructions:  2.5 mg every Mon, Wed, Sat; 5 mg all other days   Next INR check:  1/13/2023             Telephone call with  Mary Kay ([phone]) who verbalizes understanding and agrees to plan    Patient elected to schedule next visit 1-2 wks.    - she will call back to schedule INR after she finds transportation.    Education provided:   Taking warfarin: Importance of taking warfarin as instructed  Goal range and lab monitoring: goal range and significance of current result  Dietary considerations: importance of consistent vitamin K intake  Symptom monitoring: monitoring for bleeding signs and symptoms  Importance of notifying anticoagulation clinic for: diarrhea, nausea/vomiting, reduced intake, cold/flu, and/or infections; a sooner lab recheck maybe needed    Plan made per [facility] anticoagulation protocol    Pascale Sherwood, RN  Anticoagulation Clinic  12/30/2022    _______________________________________________________________________      Anticoagulation Episode Summary     Current INR goal:  2.0-3.0   TTR:  38.7 % (11.2 mo)   Target end date:  Indefinite   Send INR reminders to:  [facility]    Indications    Paroxysmal Atrial Fibrillation [I48.91]  Atrial fibrillation  unspecified type (H) [I48.91]           Comments:           Anticoagulation Care Providers     Provider Role Specialty Phone number    Cammy Bui MD [facility] Family Medicine [phone]

## 2023-01-01 ASSESSMENT — ENCOUNTER SYMPTOMS
POLYDIPSIA: 0
FEVER: 0
SHORTNESS OF BREATH: 1
WEAKNESS: 1
COUGH: 1
ALLERGIC/IMMUNOLOGIC NEGATIVE: 1
CHILLS: 0

## 2023-01-03 ENCOUNTER — NURSE TRIAGE (OUTPATIENT)
Dept: NURSING | Facility: CLINIC | Age: 73
End: 2023-01-03

## 2023-01-03 DIAGNOSIS — J44.1 COPD EXACERBATION (H): ICD-10-CM

## 2023-01-03 NOTE — TELEPHONE ENCOUNTER
PCP is not in the clinic.     RN will route this encounter to DOD, , to review and advise.            Jacquelyn Mena RN  [facility]

## 2023-01-03 NOTE — TELEPHONE ENCOUNTER
Okay to use prednisone.  I will renew Symbicort and she should start using that.  Would encourage use of duo nebs.  If she continues to get worse, she should go to the emergency room.  Please try to schedule her with a reserved appointment next week.

## 2023-01-03 NOTE — TELEPHONE ENCOUNTER
"Nurse Triage SBAR    Is this a 2nd Level Triage? YES, LICENSED PRACTITIONER REVIEW IS REQUIRED    Situation: long covid worsening symptoms needing oxygen 5 lpm 24/7    Background: Patient had Covid over Thanksgiving and has not been able to recover. Patient is on oxygen and is able to get up and go to the bathroom but if she does anything more than that it will lead to coughing. Patient states that she is not having any shortness of breath with her oxygen on. Patient has enough tubing to get from her bed to the bathroom. The coughing is productive of clear phlegm. Laying down will produce coughing as well. Patient reports that since leaving the hospital she was using the Oxygen at night and when active but for the last week has needed it all the time. Patient has been using her albuterol inhaler three times a day. Patient is out of her Symbicort inhaler for the last 5 days has been without this medication. Patient has not done any Duoneb nebulizer treatments.     Patient has prednisone 20 mg, 10 tablets at home, that was prescribed on 12/27 that she has not taken due to INR being out of range. Now the INR is in range. Patient asking if she can now take that     Pulse oximeter is out in the living room and no one is home and patient afraid to go get it in case she falls.   Patient states she has an occasional flutter in her chest that she attributes to her A-fib and is brief. Patient also states she has a heaviness with her breathing \"when they fill up\". When patient coughs up then it is better.     Patient is not wanting to leave the house today if at all possible.  Patient has help coming to her home in a few hours and will get her nebulizer going and will get her pulse oximeter and call in her oxygen and heart rate. Reviewed 911 symptoms and patient agrees to call if needed.    Assessment: phone visit, consideration of prednisone, nebs, inhalers.   ED eval?    Protocol Recommended Disposition:   Go To Office " "Now    Recommendation: next steps, disposition.     Routed to provider    Does the patient meet one of the following criteria for ADS visit consideration? 16+ years old, with an [facility] PCP     TIP  Providers, please consider if this condition is appropriate for management at one of our Acute and Diagnostic Services sites.     If patient is a good candidate, please use dotphrase <dot>triageresponse and select Refer to ADS to document.      Patient had Covid over Thanksgiving and has not been able to recover. Patient is on oxygen and is able to get up and go to the bathroom but if she does anything more than that it will lead to coughing. Patient states that she is not having any shortness of breath with her oxygen on. Patient has enough tubing to get from her bed to the bathroom. The coughing is productive of clear phlegm. Laying down will produce coughing as well. Patient reports that since leaving the hospital she was using the Oxygen at night and when active but for the last week has needed it all the time. Patient has been using her albuterol inhaler three times a day. Patient is out of her Symbicort inhaler for the last 5 days has been without this medication. Patient has not done any Duoneb nebulizer treatments.     Patient has prednisone 20 mg, 10 tablets at home, that was prescribed on 12/27 that she has not taken due to INR being out of range. Now the INR is in range. Patient asking if she can now take that     Pulse oximeter is out in the living room and no one is home and patient afraid to go get it in case she falls.   Patient states she has an occasional flutter in her chest that she attributes to her A-fib and is brief. Patient also states she has a heaviness with her breathing \"when they fill up\". When patient coughs up then it is better.   Protocol recommends go to office now  Patient is not wanting to leave the house today if at all possible.  Patient has help coming to her home in a few hours and " will get her nebulizer going and will get her pulse oximeter and call in her oxygen and heart rate. Reviewed 911 symptoms and patient agrees to call if needed.   Call back to patient at [phone]  Tami Turner RN   01/03/23 9:23 AM  [facility] Nurse Advisor    Reason for Disposition    [1] MILD difficulty breathing (e.g., minimal/no SOB at rest, SOB with walking, pulse <100) AND [2] new-onset    Additional Information    Negative: SEVERE difficulty breathing (e.g., struggling for each breath, speaks in single words)    Negative: [1] SEVERE weakness (e.g., can't stand or can barely walk) AND [2] new-onset or WORSE    Negative: Difficult to awaken or acting confused (e.g., disoriented, slurred speech)    Negative: Bluish (or gray) lips or face now    Negative: Sounds like a life-threatening emergency to the triager    Negative: [1] Typical COVID-19 symptoms AND [2] lasting less than 3 weeks    Negative: [1] Chest pain, pressure, or tightness AND [2] new-onset or worsening    Negative: [1] Fever AND [2] new-onset or worsening    Negative: [1] MODERATE difficulty breathing (e.g., speaks in phrases, SOB even at rest, pulse 100-120) AND [2] new-onset or WORSE    Negative: [1] MODERATE difficulty breathing AND [2] oxygen level (e.g., pulse oximetry) 91 to 94 percent    Negative: Oxygen level (e.g., pulse oximetry) 90 percent or lower    Negative: MODERATE difficulty breathing (e.g., speaks in phrases, SOB even at rest, pulse 100-120)    Negative: [1] Drinking very little AND [2] dehydration suspected (e.g., no urine > 12 hours, very dry mouth, very lightheaded)    Negative: Patient sounds very sick or weak to the triager    Protocols used: CORONAVIRUS (COVID-19) PERSISTING SYMPTOMS FOLLOW-UP CALL-A-OH 1.18.2022

## 2023-01-05 NOTE — TELEPHONE ENCOUNTER
Provider Response to 2nd Level Triage Request    I have reviewed the RN documentation. My recommendation is:  Refer to ED - if any change.

## 2023-01-05 NOTE — TELEPHONE ENCOUNTER
RN attempted to contact patient, but no answer. Left message on patient's voice mail to call clinic back.    If patient calls back, please relay message below and okay to schedule patient in an acute or sameday slot. Thanks    Denise Maddox RN  [facility]    Okay to use prednisone.  I will renew Symbicort and she should start using that.  Would encourage use of duo nebs.  If she continues to get worse, she should go to the emergency room.  Please try to schedule her with a reserved appointment next week.

## 2023-01-06 RX ORDER — BUDESONIDE AND FORMOTEROL FUMARATE DIHYDRATE 160; 4.5 UG/1; UG/1
2 AEROSOL RESPIRATORY (INHALATION) 2 TIMES DAILY
Qty: 10.2 G | Refills: 6 | Status: SHIPPED | OUTPATIENT
Start: 2023-01-06 | End: 2023-04-30

## 2023-01-06 NOTE — TELEPHONE ENCOUNTER
RN called patient to relay  and 's messages.    According to patient, she stated that she is feeling better. Patient still wants the provider to renew her Symbicort.     Patient's took over the phone and stated that patient is on 5 Liter oxygen via nasal cannula and can't go to the appointment without having portable oxygen tank.     Patient's son requested the provider to order the portable oxygen tank. Patient is using [employer].    Patient's son stated patient also needs to follow up for her INR.      RN will route this encounter to   review and advise.          Jacquelyn Mena RN  [facility]

## 2023-01-06 NOTE — TELEPHONE ENCOUNTER
I have refilled the Symbicort.  Will write a letter prescription for the oxygen tank - someone will need to get that to her oxygen supplier ([facility] [employer])

## 2023-01-09 NOTE — TELEPHONE ENCOUNTER
Contacted patient and relayed message below from Dr Brizuela.    Patient using her nebulizer 4 times a day  Patient will use inhaler  Patient finished Rx for prednisone last night, 1/8/23  Patient very fatigued this morning  She is trying to get up and move more with her walker  No wheezing or chest tightness / pain. Now able to cough up phlegm. No fever  If patient gets worse today, she will go to [facility].  Scheduled an appointment on 1/20/23 with Dr Brizuela.    Contacted [employer] and patient already receiving oxygen full time with liquid oxygen concentrator and she can refill portable tank.  Kami will contact patient and explain oxygen system with her.  Faxed letter to [employer] @ [phone]     Also patient has NOT received her Rx for Omeprazole.  Prior Auth done and approved  Contacted [facility] with approval and they will fill Omeprazole and contact patient when ready.    Prior Authorization Approval  Authorization Effective Date: 11/28/2022  Authorization Expiration Date: 12/27/2025  Medication: Omeprazole  Approved Dose/Quantity    Message routed to Dr Brizuela for FYI. Patient also wondering if she wants patient to be seen sooner and possible overbook appointment?    Denise Maddox RN  [facility]

## 2023-01-09 NOTE — TELEPHONE ENCOUNTER
"Provider Response to 2nd Level Triage Request    I have reviewed the RN documentation. My recommendation is:  if patient is worse, she needs to be seen at ER, not in our clinic.  She is a high risk patient with poor underlying physical status to start.  Otherwise, I'll see her as scheduled here in office.   I have already treated her \"virtually\" and she needs evaluation if worsening.    "

## 2023-01-10 ENCOUNTER — APPOINTMENT (OUTPATIENT)
Dept: RADIOLOGY | Facility: CLINIC | Age: 73
DRG: 291 | End: 2023-01-10
Attending: EMERGENCY MEDICINE
Payer: COMMERCIAL

## 2023-01-10 ENCOUNTER — HOSPITAL ENCOUNTER (INPATIENT)
Facility: CLINIC | Age: 73
LOS: 9 days | Discharge: HOME HEALTH | DRG: 291 | End: 2023-01-19
Attending: EMERGENCY MEDICINE | Admitting: INTERNAL MEDICINE
Payer: COMMERCIAL

## 2023-01-10 DIAGNOSIS — J90 PLEURAL EFFUSION: ICD-10-CM

## 2023-01-10 DIAGNOSIS — I48.91 ATRIAL FIBRILLATION, UNSPECIFIED TYPE (H): ICD-10-CM

## 2023-01-10 DIAGNOSIS — J44.9 CHRONIC OBSTRUCTIVE PULMONARY DISEASE, UNSPECIFIED COPD TYPE (H): ICD-10-CM

## 2023-01-10 DIAGNOSIS — J96.22 ACUTE AND CHRONIC RESPIRATORY FAILURE WITH HYPERCAPNIA (H): Primary | ICD-10-CM

## 2023-01-10 DIAGNOSIS — J44.1 COPD EXACERBATION (H): ICD-10-CM

## 2023-01-10 DIAGNOSIS — R06.00 DYSPNEA, UNSPECIFIED TYPE: ICD-10-CM

## 2023-01-10 DIAGNOSIS — R09.02 HYPOXIA: ICD-10-CM

## 2023-01-10 DIAGNOSIS — J96.01 ACUTE RESPIRATORY FAILURE WITH HYPOXIA (H): ICD-10-CM

## 2023-01-10 DIAGNOSIS — R60.0 BILATERAL LOWER EXTREMITY EDEMA: ICD-10-CM

## 2023-01-10 DIAGNOSIS — I47.10 SVT (SUPRAVENTRICULAR TACHYCARDIA) (H): ICD-10-CM

## 2023-01-10 LAB
ANION GAP SERPL CALCULATED.3IONS-SCNC: 14 MMOL/L (ref 5–18)
ATRIAL RATE - MUSE: 110 BPM
BASOPHILS # BLD AUTO: 0 10E3/UL (ref 0–0.2)
BASOPHILS NFR BLD AUTO: 0 %
BNP SERPL-MCNC: 224 PG/ML (ref 0–127)
BUN SERPL-MCNC: 25 MG/DL (ref 8–28)
CALCIUM SERPL-MCNC: 9.7 MG/DL (ref 8.5–10.5)
CHLORIDE BLD-SCNC: 100 MMOL/L (ref 98–107)
CO2 SERPL-SCNC: 28 MMOL/L (ref 22–31)
CREAT SERPL-MCNC: 0.7 MG/DL (ref 0.6–1.1)
DIASTOLIC BLOOD PRESSURE - MUSE: 84 MMHG
EOSINOPHIL # BLD AUTO: 0.2 10E3/UL (ref 0–0.7)
EOSINOPHIL NFR BLD AUTO: 2 %
ERYTHROCYTE [DISTWIDTH] IN BLOOD BY AUTOMATED COUNT: 21.8 % (ref 10–15)
GFR SERPL CREATININE-BSD FRML MDRD: >90 ML/MIN/1.73M2
GLUCOSE BLD-MCNC: 174 MG/DL (ref 70–125)
HCT VFR BLD AUTO: 34 % (ref 35–47)
HGB BLD-MCNC: 9.3 G/DL (ref 11.7–15.7)
IMM GRANULOCYTES # BLD: 0 10E3/UL
IMM GRANULOCYTES NFR BLD: 1 %
INR PPP: 2.48 (ref 0.85–1.15)
INTERPRETATION ECG - MUSE: NORMAL
LYMPHOCYTES # BLD AUTO: 1.8 10E3/UL (ref 0.8–5.3)
LYMPHOCYTES NFR BLD AUTO: 22 %
MAGNESIUM SERPL-MCNC: 1.8 MG/DL (ref 1.8–2.6)
MCH RBC QN AUTO: 21.3 PG (ref 26.5–33)
MCHC RBC AUTO-ENTMCNC: 27.4 G/DL (ref 31.5–36.5)
MCV RBC AUTO: 78 FL (ref 78–100)
MONOCYTES # BLD AUTO: 0.8 10E3/UL (ref 0–1.3)
MONOCYTES NFR BLD AUTO: 10 %
NEUTROPHILS # BLD AUTO: 5.3 10E3/UL (ref 1.6–8.3)
NEUTROPHILS NFR BLD AUTO: 65 %
NRBC # BLD AUTO: 0 10E3/UL
NRBC BLD AUTO-RTO: 1 /100
P AXIS - MUSE: 63 DEGREES
PLATELET # BLD AUTO: 377 10E3/UL (ref 150–450)
POTASSIUM BLD-SCNC: 4.5 MMOL/L (ref 3.5–5)
PR INTERVAL - MUSE: 192 MS
QRS DURATION - MUSE: 96 MS
QT - MUSE: 294 MS
QTC - MUSE: 397 MS
R AXIS - MUSE: -54 DEGREES
RBC # BLD AUTO: 4.37 10E6/UL (ref 3.8–5.2)
SODIUM SERPL-SCNC: 142 MMOL/L (ref 136–145)
SYSTOLIC BLOOD PRESSURE - MUSE: 128 MMHG
T AXIS - MUSE: 88 DEGREES
TROPONIN I SERPL-MCNC: 0.03 NG/ML (ref 0–0.29)
VENTRICULAR RATE- MUSE: 110 BPM
WBC # BLD AUTO: 8.1 10E3/UL (ref 4–11)

## 2023-01-10 PROCEDURE — 93005 ELECTROCARDIOGRAM TRACING: CPT | Performed by: EMERGENCY MEDICINE

## 2023-01-10 PROCEDURE — 99223 1ST HOSP IP/OBS HIGH 75: CPT | Performed by: INTERNAL MEDICINE

## 2023-01-10 PROCEDURE — 999N000157 HC STATISTIC RCP TIME EA 10 MIN

## 2023-01-10 PROCEDURE — 85025 COMPLETE CBC W/AUTO DIFF WBC: CPT | Performed by: EMERGENCY MEDICINE

## 2023-01-10 PROCEDURE — 250N000011 HC RX IP 250 OP 636: Performed by: EMERGENCY MEDICINE

## 2023-01-10 PROCEDURE — 250N000011 HC RX IP 250 OP 636

## 2023-01-10 PROCEDURE — 83735 ASSAY OF MAGNESIUM: CPT | Performed by: EMERGENCY MEDICINE

## 2023-01-10 PROCEDURE — 250N000009 HC RX 250: Performed by: EMERGENCY MEDICINE

## 2023-01-10 PROCEDURE — 82248 BILIRUBIN DIRECT: CPT | Performed by: INTERNAL MEDICINE

## 2023-01-10 PROCEDURE — 99152 MOD SED SAME PHYS/QHP 5/>YRS: CPT

## 2023-01-10 PROCEDURE — 84484 ASSAY OF TROPONIN QUANT: CPT | Performed by: EMERGENCY MEDICINE

## 2023-01-10 PROCEDURE — 96376 TX/PRO/DX INJ SAME DRUG ADON: CPT | Mod: 59

## 2023-01-10 PROCEDURE — 85610 PROTHROMBIN TIME: CPT | Performed by: EMERGENCY MEDICINE

## 2023-01-10 PROCEDURE — 210N000002 HC R&B HEART CARE

## 2023-01-10 PROCEDURE — 92960 CARDIOVERSION ELECTRIC EXT: CPT

## 2023-01-10 PROCEDURE — 5A2204Z RESTORATION OF CARDIAC RHYTHM, SINGLE: ICD-10-PCS | Performed by: EMERGENCY MEDICINE

## 2023-01-10 PROCEDURE — C9803 HOPD COVID-19 SPEC COLLECT: HCPCS

## 2023-01-10 PROCEDURE — 36415 COLL VENOUS BLD VENIPUNCTURE: CPT | Performed by: EMERGENCY MEDICINE

## 2023-01-10 PROCEDURE — 94660 CPAP INITIATION&MGMT: CPT

## 2023-01-10 PROCEDURE — 83880 ASSAY OF NATRIURETIC PEPTIDE: CPT | Performed by: EMERGENCY MEDICINE

## 2023-01-10 PROCEDURE — 96375 TX/PRO/DX INJ NEW DRUG ADDON: CPT | Mod: 59

## 2023-01-10 PROCEDURE — 80053 COMPREHEN METABOLIC PANEL: CPT | Performed by: EMERGENCY MEDICINE

## 2023-01-10 PROCEDURE — 99285 EMERGENCY DEPT VISIT HI MDM: CPT | Mod: 25

## 2023-01-10 PROCEDURE — 71045 X-RAY EXAM CHEST 1 VIEW: CPT

## 2023-01-10 RX ORDER — CEFTRIAXONE 2 G/1
2 INJECTION, POWDER, FOR SOLUTION INTRAMUSCULAR; INTRAVENOUS ONCE
Status: COMPLETED | OUTPATIENT
Start: 2023-01-10 | End: 2023-01-11

## 2023-01-10 RX ORDER — ONDANSETRON 2 MG/ML
4 INJECTION INTRAMUSCULAR; INTRAVENOUS ONCE
Status: COMPLETED | OUTPATIENT
Start: 2023-01-10 | End: 2023-01-10

## 2023-01-10 RX ORDER — ETOMIDATE 2 MG/ML
10 INJECTION INTRAVENOUS ONCE
Status: COMPLETED | OUTPATIENT
Start: 2023-01-10 | End: 2023-01-10

## 2023-01-10 RX ORDER — ADENOSINE 3 MG/ML
12 INJECTION, SOLUTION INTRAVENOUS ONCE
Status: COMPLETED | OUTPATIENT
Start: 2023-01-10 | End: 2023-01-10

## 2023-01-10 RX ORDER — ADENOSINE 3 MG/ML
INJECTION, SOLUTION INTRAVENOUS
Status: COMPLETED
Start: 2023-01-10 | End: 2023-01-10

## 2023-01-10 RX ORDER — FUROSEMIDE 10 MG/ML
40 INJECTION INTRAMUSCULAR; INTRAVENOUS ONCE
Status: COMPLETED | OUTPATIENT
Start: 2023-01-10 | End: 2023-01-11

## 2023-01-10 RX ADMIN — ONDANSETRON 4 MG: 2 INJECTION INTRAMUSCULAR; INTRAVENOUS at 22:00

## 2023-01-10 RX ADMIN — ADENOSINE 12 MG: 3 INJECTION, SOLUTION INTRAVENOUS at 21:48

## 2023-01-10 RX ADMIN — ONDANSETRON 4 MG: 2 INJECTION INTRAMUSCULAR; INTRAVENOUS at 23:18

## 2023-01-10 RX ADMIN — ETOMIDATE 10 MG: 2 INJECTION, SOLUTION INTRAVENOUS at 22:02

## 2023-01-10 ASSESSMENT — ENCOUNTER SYMPTOMS
VOMITING: 0
NAUSEA: 0
LIGHT-HEADEDNESS: 1
DIZZINESS: 1
SHORTNESS OF BREATH: 0
CHEST TIGHTNESS: 0
HEADACHES: 0
ABDOMINAL PAIN: 0
NUMBNESS: 0
FEVER: 0

## 2023-01-10 ASSESSMENT — ACTIVITIES OF DAILY LIVING (ADL): ADLS_ACUITY_SCORE: 35

## 2023-01-11 ENCOUNTER — PATIENT OUTREACH (OUTPATIENT)
Dept: GERIATRIC MEDICINE | Facility: CLINIC | Age: 73
End: 2023-01-11

## 2023-01-11 ENCOUNTER — APPOINTMENT (OUTPATIENT)
Dept: ULTRASOUND IMAGING | Facility: CLINIC | Age: 73
DRG: 291 | End: 2023-01-11
Attending: INTERNAL MEDICINE
Payer: COMMERCIAL

## 2023-01-11 ENCOUNTER — APPOINTMENT (OUTPATIENT)
Dept: CARDIOLOGY | Facility: CLINIC | Age: 73
DRG: 291 | End: 2023-01-11
Attending: INTERNAL MEDICINE
Payer: COMMERCIAL

## 2023-01-11 LAB
% LINING CELLS, BODY FLUID: 2 %
ALBUMIN SERPL-MCNC: 3.1 G/DL (ref 3.5–5)
ALBUMIN SERPL-MCNC: 3.1 G/DL (ref 3.5–5)
ALP SERPL-CCNC: 101 U/L (ref 45–120)
ALP SERPL-CCNC: 90 U/L (ref 45–120)
ALT SERPL W P-5'-P-CCNC: 65 U/L (ref 0–45)
ALT SERPL W P-5'-P-CCNC: 78 U/L (ref 0–45)
ANION GAP SERPL CALCULATED.3IONS-SCNC: 14 MMOL/L (ref 5–18)
APPEARANCE FLD: CLEAR
AST SERPL W P-5'-P-CCNC: 45 U/L (ref 0–40)
AST SERPL W P-5'-P-CCNC: 58 U/L (ref 0–40)
BASOPHILS # BLD AUTO: 0 10E3/UL (ref 0–0.2)
BASOPHILS NFR BLD AUTO: 0 %
BILIRUB DIRECT SERPL-MCNC: 0.2 MG/DL
BILIRUB SERPL-MCNC: 0.5 MG/DL (ref 0–1)
BILIRUB SERPL-MCNC: 0.5 MG/DL (ref 0–1)
BUN SERPL-MCNC: 22 MG/DL (ref 8–28)
CALCIUM SERPL-MCNC: 9 MG/DL (ref 8.5–10.5)
CELL COUNT BODY FLUID SOURCE: NORMAL
CHLORIDE BLD-SCNC: 102 MMOL/L (ref 98–107)
CO2 SERPL-SCNC: 26 MMOL/L (ref 22–31)
COLOR FLD: YELLOW
CREAT SERPL-MCNC: 0.65 MG/DL (ref 0.6–1.1)
EOSINOPHIL # BLD AUTO: 0 10E3/UL (ref 0–0.7)
EOSINOPHIL NFR BLD AUTO: 0 %
ERYTHROCYTE [DISTWIDTH] IN BLOOD BY AUTOMATED COUNT: 21.4 % (ref 10–15)
GFR SERPL CREATININE-BSD FRML MDRD: >90 ML/MIN/1.73M2
GLUCOSE BLD-MCNC: 155 MG/DL (ref 70–125)
GLUCOSE BLDC GLUCOMTR-MCNC: 115 MG/DL (ref 70–99)
GLUCOSE BLDC GLUCOMTR-MCNC: 126 MG/DL (ref 70–99)
GLUCOSE BLDC GLUCOMTR-MCNC: 130 MG/DL (ref 70–99)
GLUCOSE BLDC GLUCOMTR-MCNC: 146 MG/DL (ref 70–99)
GLUCOSE BLDC GLUCOMTR-MCNC: 154 MG/DL (ref 70–99)
GLUCOSE BLDC GLUCOMTR-MCNC: 170 MG/DL (ref 70–99)
GLUCOSE BODY FLUID SOURCE: NORMAL
GLUCOSE FLD-MCNC: 152 MG/DL
HCT VFR BLD AUTO: 29.4 % (ref 35–47)
HGB BLD-MCNC: 8.1 G/DL (ref 11.7–15.7)
IMM GRANULOCYTES # BLD: 0.1 10E3/UL
IMM GRANULOCYTES NFR BLD: 1 %
L PNEUMO1 AG UR QL IA: NEGATIVE
LD BODY BODY FLUID SOURCE: NORMAL
LDH FLD L TO P-CCNC: 55 U/L
LDH SERPL L TO P-CCNC: 234 U/L (ref 125–220)
LVEF ECHO: NORMAL
LYMPHOCYTES # BLD AUTO: 0.5 10E3/UL (ref 0.8–5.3)
LYMPHOCYTES NFR BLD AUTO: 8 %
LYMPHOCYTES NFR FLD MANUAL: 40 %
MCH RBC QN AUTO: 21.5 PG (ref 26.5–33)
MCHC RBC AUTO-ENTMCNC: 27.6 G/DL (ref 31.5–36.5)
MCV RBC AUTO: 78 FL (ref 78–100)
MONOCYTES # BLD AUTO: 0.1 10E3/UL (ref 0–1.3)
MONOCYTES NFR BLD AUTO: 3 %
MONOS+MACROS NFR FLD MANUAL: 17 %
NEUTROPHILS # BLD AUTO: 5 10E3/UL (ref 1.6–8.3)
NEUTROPHILS NFR BLD AUTO: 88 %
NEUTS BAND NFR FLD MANUAL: 41 %
NRBC # BLD AUTO: 0 10E3/UL
NRBC BLD AUTO-RTO: 1 /100
PH BODY FLUID SOURCE: NORMAL
PH FLD: 8 PH
PLATELET # BLD AUTO: 286 10E3/UL (ref 150–450)
POTASSIUM BLD-SCNC: 5.1 MMOL/L (ref 3.5–5)
PROCALCITONIN SERPL-MCNC: 0.04 NG/ML (ref 0–0.49)
PROT FLD-MCNC: 1.6 G/DL
PROT SERPL-MCNC: 6.2 G/DL (ref 6–8)
PROT SERPL-MCNC: 6.6 G/DL (ref 6–8)
PROTEIN BODY FLUID SOURCE: NORMAL
RBC # BLD AUTO: 3.77 10E6/UL (ref 3.8–5.2)
RBC # FLD: 0 /UL
S PNEUM AG SPEC QL: NEGATIVE
SARS-COV-2 RNA RESP QL NAA+PROBE: POSITIVE
SODIUM SERPL-SCNC: 142 MMOL/L (ref 136–145)
TROPONIN I SERPL-MCNC: 0.03 NG/ML (ref 0–0.29)
WBC # BLD AUTO: 5.6 10E3/UL (ref 4–11)
WBC # FLD AUTO: 264 /UL

## 2023-01-11 PROCEDURE — 250N000009 HC RX 250: Performed by: INTERNAL MEDICINE

## 2023-01-11 PROCEDURE — 258N000003 HC RX IP 258 OP 636: Performed by: HOSPITALIST

## 2023-01-11 PROCEDURE — 82962 GLUCOSE BLOOD TEST: CPT

## 2023-01-11 PROCEDURE — 250N000011 HC RX IP 250 OP 636: Performed by: EMERGENCY MEDICINE

## 2023-01-11 PROCEDURE — 999N000157 HC STATISTIC RCP TIME EA 10 MIN

## 2023-01-11 PROCEDURE — U0003 INFECTIOUS AGENT DETECTION BY NUCLEIC ACID (DNA OR RNA); SEVERE ACUTE RESPIRATORY SYNDROME CORONAVIRUS 2 (SARS-COV-2) (CORONAVIRUS DISEASE [COVID-19]), AMPLIFIED PROBE TECHNIQUE, MAKING USE OF HIGH THROUGHPUT TECHNOLOGIES AS DESCRIBED BY CMS-2020-01-R: HCPCS | Performed by: EMERGENCY MEDICINE

## 2023-01-11 PROCEDURE — 250N000012 HC RX MED GY IP 250 OP 636 PS 637: Performed by: INTERNAL MEDICINE

## 2023-01-11 PROCEDURE — 87205 SMEAR GRAM STAIN: CPT | Performed by: INTERNAL MEDICINE

## 2023-01-11 PROCEDURE — 80053 COMPREHEN METABOLIC PANEL: CPT | Performed by: INTERNAL MEDICINE

## 2023-01-11 PROCEDURE — 88305 TISSUE EXAM BY PATHOLOGIST: CPT | Mod: TC | Performed by: INTERNAL MEDICINE

## 2023-01-11 PROCEDURE — 99223 1ST HOSP IP/OBS HIGH 75: CPT | Performed by: INTERNAL MEDICINE

## 2023-01-11 PROCEDURE — 83615 LACTATE (LD) (LDH) ENZYME: CPT | Performed by: INTERNAL MEDICINE

## 2023-01-11 PROCEDURE — 84145 PROCALCITONIN (PCT): CPT | Performed by: INTERNAL MEDICINE

## 2023-01-11 PROCEDURE — 32555 ASPIRATE PLEURA W/ IMAGING: CPT

## 2023-01-11 PROCEDURE — 82945 GLUCOSE OTHER FLUID: CPT | Performed by: INTERNAL MEDICINE

## 2023-01-11 PROCEDURE — 89050 BODY FLUID CELL COUNT: CPT | Performed by: INTERNAL MEDICINE

## 2023-01-11 PROCEDURE — 272N000710 US THORACENTESIS

## 2023-01-11 PROCEDURE — 87149 DNA/RNA DIRECT PROBE: CPT | Performed by: EMERGENCY MEDICINE

## 2023-01-11 PROCEDURE — 36415 COLL VENOUS BLD VENIPUNCTURE: CPT | Performed by: EMERGENCY MEDICINE

## 2023-01-11 PROCEDURE — 85025 COMPLETE CBC W/AUTO DIFF WBC: CPT | Performed by: INTERNAL MEDICINE

## 2023-01-11 PROCEDURE — 94660 CPAP INITIATION&MGMT: CPT

## 2023-01-11 PROCEDURE — 36415 COLL VENOUS BLD VENIPUNCTURE: CPT | Performed by: INTERNAL MEDICINE

## 2023-01-11 PROCEDURE — XW033E5 INTRODUCTION OF REMDESIVIR ANTI-INFECTIVE INTO PERIPHERAL VEIN, PERCUTANEOUS APPROACH, NEW TECHNOLOGY GROUP 5: ICD-10-PCS | Performed by: HOSPITALIST

## 2023-01-11 PROCEDURE — 250N000013 HC RX MED GY IP 250 OP 250 PS 637: Performed by: HOSPITALIST

## 2023-01-11 PROCEDURE — 94640 AIRWAY INHALATION TREATMENT: CPT

## 2023-01-11 PROCEDURE — 250N000011 HC RX IP 250 OP 636: Performed by: HOSPITALIST

## 2023-01-11 PROCEDURE — 96367 TX/PROPH/DG ADDL SEQ IV INF: CPT | Mod: 59

## 2023-01-11 PROCEDURE — 87077 CULTURE AEROBIC IDENTIFY: CPT | Performed by: EMERGENCY MEDICINE

## 2023-01-11 PROCEDURE — 96376 TX/PRO/DX INJ SAME DRUG ADON: CPT | Mod: 59

## 2023-01-11 PROCEDURE — 250N000011 HC RX IP 250 OP 636: Performed by: INTERNAL MEDICINE

## 2023-01-11 PROCEDURE — 99233 SBSQ HOSP IP/OBS HIGH 50: CPT | Performed by: HOSPITALIST

## 2023-01-11 PROCEDURE — 96375 TX/PRO/DX INJ NEW DRUG ADDON: CPT | Mod: 59

## 2023-01-11 PROCEDURE — 93306 TTE W/DOPPLER COMPLETE: CPT | Mod: 26 | Performed by: INTERNAL MEDICINE

## 2023-01-11 PROCEDURE — 83986 ASSAY PH BODY FLUID NOS: CPT | Performed by: INTERNAL MEDICINE

## 2023-01-11 PROCEDURE — 87899 AGENT NOS ASSAY W/OPTIC: CPT | Performed by: INTERNAL MEDICINE

## 2023-01-11 PROCEDURE — 210N000002 HC R&B HEART CARE

## 2023-01-11 PROCEDURE — 255N000002 HC RX 255 OP 636: Performed by: HOSPITALIST

## 2023-01-11 PROCEDURE — 0W993ZZ DRAINAGE OF RIGHT PLEURAL CAVITY, PERCUTANEOUS APPROACH: ICD-10-PCS | Performed by: RADIOLOGY

## 2023-01-11 PROCEDURE — 84484 ASSAY OF TROPONIN QUANT: CPT | Performed by: INTERNAL MEDICINE

## 2023-01-11 PROCEDURE — 88112 CYTOPATH CELL ENHANCE TECH: CPT | Mod: TC | Performed by: INTERNAL MEDICINE

## 2023-01-11 PROCEDURE — 96365 THER/PROPH/DIAG IV INF INIT: CPT | Mod: 59

## 2023-01-11 PROCEDURE — 5A09357 ASSISTANCE WITH RESPIRATORY VENTILATION, LESS THAN 24 CONSECUTIVE HOURS, CONTINUOUS POSITIVE AIRWAY PRESSURE: ICD-10-PCS | Performed by: EMERGENCY MEDICINE

## 2023-01-11 PROCEDURE — 94640 AIRWAY INHALATION TREATMENT: CPT | Mod: 76

## 2023-01-11 PROCEDURE — 87075 CULTR BACTERIA EXCEPT BLOOD: CPT | Performed by: INTERNAL MEDICINE

## 2023-01-11 PROCEDURE — 84157 ASSAY OF PROTEIN OTHER: CPT | Performed by: INTERNAL MEDICINE

## 2023-01-11 PROCEDURE — 258N000003 HC RX IP 258 OP 636: Performed by: INTERNAL MEDICINE

## 2023-01-11 RX ORDER — SUCRALFATE 1 G/1
1 TABLET ORAL 3 TIMES DAILY
Status: DISCONTINUED | OUTPATIENT
Start: 2023-01-11 | End: 2023-01-19 | Stop reason: HOSPADM

## 2023-01-11 RX ORDER — AZITHROMYCIN 500 MG/1
500 TABLET, FILM COATED ORAL DAILY
Status: DISCONTINUED | OUTPATIENT
Start: 2023-01-11 | End: 2023-01-14

## 2023-01-11 RX ORDER — WARFARIN SODIUM 2 MG/1
2 TABLET ORAL
Status: COMPLETED | OUTPATIENT
Start: 2023-01-11 | End: 2023-01-11

## 2023-01-11 RX ORDER — ACETAMINOPHEN 325 MG/1
650 TABLET ORAL EVERY 4 HOURS PRN
Status: DISCONTINUED | OUTPATIENT
Start: 2023-01-11 | End: 2023-01-19 | Stop reason: HOSPADM

## 2023-01-11 RX ORDER — AZITHROMYCIN 500 MG/5ML
500 INJECTION, POWDER, LYOPHILIZED, FOR SOLUTION INTRAVENOUS EVERY 24 HOURS
Status: DISCONTINUED | OUTPATIENT
Start: 2023-01-11 | End: 2023-01-11

## 2023-01-11 RX ORDER — METHYLPREDNISOLONE SODIUM SUCCINATE 40 MG/ML
40 INJECTION, POWDER, LYOPHILIZED, FOR SOLUTION INTRAMUSCULAR; INTRAVENOUS EVERY 24 HOURS
Status: DISCONTINUED | OUTPATIENT
Start: 2023-01-11 | End: 2023-01-11

## 2023-01-11 RX ORDER — ATORVASTATIN CALCIUM 10 MG/1
20 TABLET, FILM COATED ORAL AT BEDTIME
Status: DISCONTINUED | OUTPATIENT
Start: 2023-01-11 | End: 2023-01-19 | Stop reason: HOSPADM

## 2023-01-11 RX ORDER — FLUTICASONE FUROATE AND VILANTEROL 200; 25 UG/1; UG/1
1 POWDER RESPIRATORY (INHALATION) DAILY
Status: DISCONTINUED | OUTPATIENT
Start: 2023-01-11 | End: 2023-01-19 | Stop reason: HOSPADM

## 2023-01-11 RX ORDER — PREDNISONE 20 MG/1
40 TABLET ORAL DAILY
Status: COMPLETED | OUTPATIENT
Start: 2023-01-12 | End: 2023-01-15

## 2023-01-11 RX ORDER — DEXTROSE MONOHYDRATE 25 G/50ML
25-50 INJECTION, SOLUTION INTRAVENOUS
Status: DISCONTINUED | OUTPATIENT
Start: 2023-01-11 | End: 2023-01-19 | Stop reason: HOSPADM

## 2023-01-11 RX ORDER — FUROSEMIDE 10 MG/ML
20 INJECTION INTRAMUSCULAR; INTRAVENOUS
Status: DISCONTINUED | OUTPATIENT
Start: 2023-01-11 | End: 2023-01-12

## 2023-01-11 RX ORDER — FUROSEMIDE 10 MG/ML
40 INJECTION INTRAMUSCULAR; INTRAVENOUS
Status: DISCONTINUED | OUTPATIENT
Start: 2023-01-11 | End: 2023-01-11

## 2023-01-11 RX ORDER — IPRATROPIUM BROMIDE AND ALBUTEROL SULFATE 2.5; .5 MG/3ML; MG/3ML
3 SOLUTION RESPIRATORY (INHALATION)
Status: COMPLETED | OUTPATIENT
Start: 2023-01-11 | End: 2023-01-12

## 2023-01-11 RX ORDER — DILTIAZEM HYDROCHLORIDE 120 MG/1
120 CAPSULE, COATED, EXTENDED RELEASE ORAL DAILY
Status: DISCONTINUED | OUTPATIENT
Start: 2023-01-11 | End: 2023-01-12

## 2023-01-11 RX ORDER — PANTOPRAZOLE SODIUM 20 MG/1
40 TABLET, DELAYED RELEASE ORAL DAILY
Status: DISCONTINUED | OUTPATIENT
Start: 2023-01-11 | End: 2023-01-12

## 2023-01-11 RX ORDER — GABAPENTIN 600 MG/1
600 TABLET ORAL 3 TIMES DAILY
Status: DISCONTINUED | OUTPATIENT
Start: 2023-01-11 | End: 2023-01-19 | Stop reason: HOSPADM

## 2023-01-11 RX ORDER — CEFTRIAXONE 2 G/1
2 INJECTION, POWDER, FOR SOLUTION INTRAMUSCULAR; INTRAVENOUS EVERY 24 HOURS
Status: DISCONTINUED | OUTPATIENT
Start: 2023-01-11 | End: 2023-01-14

## 2023-01-11 RX ORDER — ACETAMINOPHEN 325 MG/1
650 TABLET ORAL EVERY 6 HOURS PRN
Status: DISCONTINUED | OUTPATIENT
Start: 2023-01-11 | End: 2023-01-11

## 2023-01-11 RX ORDER — MAGNESIUM SULFATE HEPTAHYDRATE 40 MG/ML
2 INJECTION, SOLUTION INTRAVENOUS ONCE
Status: COMPLETED | OUTPATIENT
Start: 2023-01-11 | End: 2023-01-11

## 2023-01-11 RX ORDER — GUAIFENESIN 200 MG/10ML
10 LIQUID ORAL EVERY 4 HOURS PRN
Status: DISCONTINUED | OUTPATIENT
Start: 2023-01-11 | End: 2023-01-19 | Stop reason: HOSPADM

## 2023-01-11 RX ORDER — LIDOCAINE 40 MG/G
CREAM TOPICAL
Status: DISCONTINUED | OUTPATIENT
Start: 2023-01-11 | End: 2023-01-19 | Stop reason: HOSPADM

## 2023-01-11 RX ORDER — WARFARIN SODIUM 2.5 MG/1
2.5 TABLET ORAL
Status: DISCONTINUED | OUTPATIENT
Start: 2023-01-11 | End: 2023-01-11

## 2023-01-11 RX ORDER — LORAZEPAM 2 MG/ML
0.25 INJECTION INTRAMUSCULAR ONCE
Status: COMPLETED | OUTPATIENT
Start: 2023-01-11 | End: 2023-01-11

## 2023-01-11 RX ORDER — ACETAMINOPHEN 650 MG/1
650 SUPPOSITORY RECTAL EVERY 6 HOURS PRN
Status: DISCONTINUED | OUTPATIENT
Start: 2023-01-11 | End: 2023-01-11

## 2023-01-11 RX ORDER — ACETAMINOPHEN 650 MG/1
650 SUPPOSITORY RECTAL EVERY 6 HOURS PRN
Status: DISCONTINUED | OUTPATIENT
Start: 2023-01-11 | End: 2023-01-19 | Stop reason: HOSPADM

## 2023-01-11 RX ORDER — NICOTINE POLACRILEX 4 MG
15-30 LOZENGE BUCCAL
Status: DISCONTINUED | OUTPATIENT
Start: 2023-01-11 | End: 2023-01-19 | Stop reason: HOSPADM

## 2023-01-11 RX ORDER — IPRATROPIUM BROMIDE AND ALBUTEROL SULFATE 2.5; .5 MG/3ML; MG/3ML
3 SOLUTION RESPIRATORY (INHALATION)
Status: DISCONTINUED | OUTPATIENT
Start: 2023-01-11 | End: 2023-01-12

## 2023-01-11 RX ADMIN — MAGNESIUM SULFATE HEPTAHYDRATE 2 G: 40 INJECTION, SOLUTION INTRAVENOUS at 04:22

## 2023-01-11 RX ADMIN — DILTIAZEM HYDROCHLORIDE 120 MG: 120 CAPSULE, COATED, EXTENDED RELEASE ORAL at 10:43

## 2023-01-11 RX ADMIN — FUROSEMIDE 40 MG: 10 INJECTION, SOLUTION INTRAVENOUS at 00:17

## 2023-01-11 RX ADMIN — PERFLUTREN 2 ML: 6.52 INJECTION, SUSPENSION INTRAVENOUS at 09:15

## 2023-01-11 RX ADMIN — SODIUM CHLORIDE 50 ML: 9 INJECTION, SOLUTION INTRAVENOUS at 16:33

## 2023-01-11 RX ADMIN — SUCRALFATE 1 G: 1 TABLET ORAL at 10:57

## 2023-01-11 RX ADMIN — SODIUM CHLORIDE 500 MG: 9 INJECTION, SOLUTION INTRAVENOUS at 01:41

## 2023-01-11 RX ADMIN — IPRATROPIUM BROMIDE AND ALBUTEROL SULFATE 3 ML: .5; 2.5 SOLUTION RESPIRATORY (INHALATION) at 12:16

## 2023-01-11 RX ADMIN — AZITHROMYCIN MONOHYDRATE 500 MG: 500 TABLET ORAL at 22:40

## 2023-01-11 RX ADMIN — CEFTRIAXONE SODIUM 2 G: 2 INJECTION, POWDER, FOR SOLUTION INTRAMUSCULAR; INTRAVENOUS at 00:25

## 2023-01-11 RX ADMIN — INSULIN ASPART 1 UNITS: 100 INJECTION, SOLUTION INTRAVENOUS; SUBCUTANEOUS at 12:59

## 2023-01-11 RX ADMIN — IPRATROPIUM BROMIDE AND ALBUTEROL SULFATE 3 ML: .5; 2.5 SOLUTION RESPIRATORY (INHALATION) at 15:32

## 2023-01-11 RX ADMIN — CEFTRIAXONE SODIUM 2 G: 2 INJECTION, POWDER, FOR SOLUTION INTRAMUSCULAR; INTRAVENOUS at 22:40

## 2023-01-11 RX ADMIN — SUCRALFATE 1 G: 1 TABLET ORAL at 20:55

## 2023-01-11 RX ADMIN — METHYLPREDNISOLONE SODIUM SUCCINATE 40 MG: 40 INJECTION, POWDER, FOR SOLUTION INTRAMUSCULAR; INTRAVENOUS at 01:42

## 2023-01-11 RX ADMIN — IPRATROPIUM BROMIDE AND ALBUTEROL SULFATE 3 ML: .5; 2.5 SOLUTION RESPIRATORY (INHALATION) at 07:53

## 2023-01-11 RX ADMIN — IPRATROPIUM BROMIDE AND ALBUTEROL SULFATE 3 ML: .5; 2.5 SOLUTION RESPIRATORY (INHALATION) at 20:19

## 2023-01-11 RX ADMIN — GABAPENTIN 600 MG: 600 TABLET, FILM COATED ORAL at 20:55

## 2023-01-11 RX ADMIN — FUROSEMIDE 20 MG: 10 INJECTION, SOLUTION INTRAVENOUS at 16:32

## 2023-01-11 RX ADMIN — PANTOPRAZOLE SODIUM 40 MG: 20 TABLET, DELAYED RELEASE ORAL at 10:43

## 2023-01-11 RX ADMIN — REMDESIVIR 200 MG: 100 INJECTION, POWDER, LYOPHILIZED, FOR SOLUTION INTRAVENOUS at 16:31

## 2023-01-11 RX ADMIN — ATORVASTATIN CALCIUM 20 MG: 10 TABLET, FILM COATED ORAL at 20:55

## 2023-01-11 RX ADMIN — SUCRALFATE 1 G: 1 TABLET ORAL at 16:32

## 2023-01-11 RX ADMIN — GABAPENTIN 600 MG: 600 TABLET, FILM COATED ORAL at 10:53

## 2023-01-11 RX ADMIN — WARFARIN SODIUM 2 MG: 2 TABLET ORAL at 18:15

## 2023-01-11 RX ADMIN — INSULIN ASPART 1 UNITS: 100 INJECTION, SOLUTION INTRAVENOUS; SUBCUTANEOUS at 16:32

## 2023-01-11 RX ADMIN — LORAZEPAM 0.25 MG: 2 INJECTION INTRAMUSCULAR; INTRAVENOUS at 00:53

## 2023-01-11 RX ADMIN — FUROSEMIDE 40 MG: 10 INJECTION, SOLUTION INTRAVENOUS at 10:39

## 2023-01-11 RX ADMIN — GABAPENTIN 600 MG: 600 TABLET, FILM COATED ORAL at 16:32

## 2023-01-11 RX ADMIN — INSULIN ASPART 1 UNITS: 100 INJECTION, SOLUTION INTRAVENOUS; SUBCUTANEOUS at 10:43

## 2023-01-11 ASSESSMENT — ACTIVITIES OF DAILY LIVING (ADL)
DRESS: 1-->ASSISTANCE (EQUIPMENT/PERSON) NEEDED
ADLS_ACUITY_SCORE: 47
CHANGE_IN_FUNCTIONAL_STATUS_SINCE_ONSET_OF_CURRENT_ILLNESS/INJURY: YES
ADLS_ACUITY_SCORE: 35
ADLS_ACUITY_SCORE: 35
DRESSING/BATHING: BATHING DIFFICULTY, ASSISTANCE 1 PERSON
TOILETING_ISSUES: NO
CONCENTRATING,_REMEMBERING_OR_MAKING_DECISIONS_DIFFICULTY: YES
WEAR_GLASSES_OR_BLIND: NO
TRANSFERRING: 1-->ASSISTANCE (EQUIPMENT/PERSON) NEEDED
ADLS_ACUITY_SCORE: 35
TRANSFERRING: 0-->ASSISTANCE NEEDED (DEVELOPMENTALLY APPROPRIATE)
ADLS_ACUITY_SCORE: 35
BATHING: 1-->ASSISTANCE NEEDED
ADLS_ACUITY_SCORE: 43
ADLS_ACUITY_SCORE: 47
WALKING_OR_CLIMBING_STAIRS: AMBULATION DIFFICULTY, REQUIRES EQUIPMENT;STAIR CLIMBING DIFFICULTY, REQUIRES EQUIPMENT
ADLS_ACUITY_SCORE: 35
DRESS: 1-->ASSISTANCE (EQUIPMENT/PERSON) NEEDED (NOT DEVELOPMENTALLY APPROPRIATE)
DEPENDENT_IADLS:: CLEANING;COOKING;LAUNDRY;SHOPPING;MEAL PREPARATION;MEDICATION MANAGEMENT;TRANSPORTATION
ADLS_ACUITY_SCORE: 35
DOING_ERRANDS_INDEPENDENTLY_DIFFICULTY: YES
ADLS_ACUITY_SCORE: 43
ADLS_ACUITY_SCORE: 47
DRESSING/BATHING_DIFFICULTY: YES
ADLS_ACUITY_SCORE: 35
DIFFICULTY_COMMUNICATING: NO
DRESSING/BATHING_MANAGEMENT: ASSIST OF ONE
FALL_HISTORY_WITHIN_LAST_SIX_MONTHS: NO
DIFFICULTY_EATING/SWALLOWING: NO
WALKING_OR_CLIMBING_STAIRS_DIFFICULTY: YES

## 2023-01-11 NOTE — PHARMACY-ANTICOAGULATION SERVICE
Clinical Pharmacy - Warfarin Dosing Consult     Pharmacy has been consulted to manage this patient s warfarin therapy.  Indication: Atrial Fibrillation  Therapy Goal: INR 2-3  Provider/Team: AARON  Warfarin Prior to Admission: Yes  Warfarin PTA Regimen: 2.5mg Mon, Wed and Sat then 5mg daily  Significant drug interactions: IP  azithro and steroids,    INR   Date Value Ref Range Status   01/10/2023 2.48 (H) 0.85 - 1.15 Final   12/30/2022 2.0 (H) 0.9 - 1.1 Final       Recommend warfarin 2.5mg today.  Pharmacy will monitor Mary Kay Tejada daily and order warfarin doses to achieve specified goal.      Please contact pharmacy as soon as possible if the warfarin needs to be held for a procedure or if the warfarin goals change.

## 2023-01-11 NOTE — CONSULTS
[facility]  General ID Service Consult      Patient: Mary Kay Tejada  YOB: 1950, MRN: [id number]  Date of Admission:  1/10/2023  Date of Consult: 01/11/2023  Consult Requested by: Louis Dutta MD  Admission Diagnosis: SVT (supraventricular tachycardia) (H) [I47.1]  Pleural effusion [J90]  Hypoxia [R09.02]  Acute respiratory failure with hypoxia (H) [J96.01]  Bilateral lower extremity edema [R60.0]      ID Assessment & Plan   CHF,SVT, aortic stenosis, right pleural effusion, Transudative  Possible right sided pneumonia, versus compressive atelectasis  COVID PCR positive.  She was positive in November.  Possible reinfection versus persistent shedding from November  Troponin normal  Active smoker  Frail elderly    PLAN   agree with IV remdesivir, ceftriaxone azithromycin  Already on steroids for COPD  Check procalcitonin  CT chest  Track inflammatory markers  Reassess with diuresis  Discussed with hospitalist  Follow on the COVID testing result for her family    Mary Garcia MD  [facility]  ______________________________________________________________________    History of Present Illness   72 year old female with a history of diabetes, fibromyalgia, paroxysmal atrial fibrillation, nicotine dependence, COPD, admitted via the emergency room with ongoing weakness and shortness of breath.  She has not felt well since her COVID infection in November 2022.  She did not take PAXLOVID but she reported being up-to-date on her COVID vaccination with boosters.  She denies coughing or sputum production.  She has been losing weight over the last year.  In the emergency room she was tachycardic with SVT at 195.  Radiology personally reviewed  CXR  Moderate size right pleural effusion has developed with diffuse opacification of the right mid and lower lung which may represent a combination of layering pleural fluid and pneumonia or atelectasis. Small left  pleural effusion and infiltrate   or atelectasis left base. Advanced degenerative osteoarthritis right shoulder.    She underwent thoracocentesis on the right side and 1 L of fluid was removed,  neutrophils 41% lymphocyte 40% pH 8 LDH 55 protein 1.6    No travel no zoonotic exposure  She lives with her son and girlfriend who do not feel ill at the moment and they will be tested for COVID tonight    TTE  1.The left ventricle is normal in size. Left ventricular function is  decreased. The ejection fraction is 50-55% (borderline). There is borderline  global hypokinesia of the left ventricle.  2. The right ventricle is mildly dilated. Mildly decreased right ventricular  systolic function  3. Severe aortic valve calcification is present. Moderate valvular aortic  stenosis.  4. The left atrium is moderately dilated.    Review of Systems   The 10 point Review of Systems is negative other than noted in the HPI or here.     Past Medical History    Past Medical History:   Diagnosis Date     Anemia      Aortic stenosis      Atrial fibrillation (H)      Atrial flutter (H)      Benign neoplasm of adenomatous polyp     large intestine      Chronic constipation      Chronic pain syndrome      COPD (chronic obstructive pulmonary disease) (H)     Oxygen at night      Dependence on supplemental oxygen     Oxygen at noc, during the day as needed     Depression      Diabetes mellitus (H)      Dry eye syndrome      Fibromyalgia      Ganglion     left wrist     GERD (gastroesophageal reflux disease)      Hyperlipidemia      Hypertension      Hypokalemia      Infective otitis externa, unspecified     Created by Conversion      Larynx edema      Lung disease      Malignant neoplasm of vulva (H)     Created by Conversion [facility] Annotation: Apr 17 2007  8:24AM - Cammy Bui:  resection per Dr. Alfonso Mane 9/06;  Replacement Utility updated for latest IMO load     Medial epicondylitis      Onychomycosis       Osteoarthritis      Peptic ulcer      Polyneuropathy      Vulvar malignant neoplasm (H)        Past Surgical History   Past Surgical History:   Procedure Laterality Date     BIOPSY BREAST Right      BIOPSY BREAST Right 01/28/2015     BIOPSY BREAST Right 1/28/2015    Procedure: RIGHT BREAST BIOPSY AFTER WIRE LOCALIZATION AT 0940;  Surgeon: Renée Soriano MD;  [place] [facility] [place]:      BIOPSY OF BREAST, INCISIONAL      Description: Incisional Breast Biopsy;  Recorded: 11/13/2007;  Comments: benign     COLONOSCOPY N/A 6/14/2019    Procedure: COLONOSCOPY;  Surgeon: Eduardo Mora MD;  [place] [facility] [place]  Service: Gastroenterology     ESOPHAGOSCOPY, GASTROSCOPY, DUODENOSCOPY (EGD), COMBINED N/A 11/6/2018    Procedure: ESOPHAGOGASTRODUODENOSCOPY;  Surgeon: Lit Fernando MD;  [place] [facility] [place]:      HYSTERECTOMY       JOINT REPLACEMENT Left     TKA     OH ABLATE HEART DYSRHYTHM FOCUS      Description: Catheter Ablation Atrial Fibrillation;  Recorded: 07/31/2012;  Comments: 7/24/12 PVI with Dr. Gardiner and nilay to all 5 pulm veins and CTI fl ablation line as well.     ZZC SUPRACERV ABD HYSTERECTOMY      Description: Supracervical Hysterectomy;  Proc Date: 01/01/1985;  Comments: some cervix left!; ovaries intact; done for bleeding       Social History   Social History     Tobacco Use     Smoking status: Every Day     Packs/day: 0.25     Types: Cigarettes     Smokeless tobacco: Never     Tobacco comments:     seen by [facility]TS inpatient on 3/31/22   Substance Use Topics     Alcohol use: Yes     Comment: Alcoholic Drinks/day: very little     Drug use: No       Family History   I have reviewed this patient's family history and updated it with pertinent information if needed.  Family History   Problem Relation Age of Onset     Heart Failure Mother      Cancer Other         paternal HX-laryngeal      Alcoholism Sister      No Known Problems Daughter      No Known Problems  "Maternal Grandmother      No Known Problems Maternal Grandfather      No Known Problems Paternal Grandmother      No Known Problems Paternal Grandfather      No Known Problems Maternal Aunt      No Known Problems Paternal Aunt      Alcoholism Sister      Alcoholism Brother      Alcoholism Father      Cancer Paternal Uncle         Gastric-Alcohol     Cancer Paternal Uncle         gastric-Alcohol     Hereditary Breast and Ovarian Cancer Syndrome No family hx of      Breast Cancer No family hx of      Colon Cancer No family hx of      Endometrial Cancer No family hx of      Ovarian Cancer No family hx of        Medications   I have reviewed this patient's current medications    Allergies   Allergies   Allergen Reactions     Celebrex [Celecoxib] Rash     patient had butterfly rash - \"lupus-like\"       Latex Rash       Physical Exam   Vital Signs: Temp: 98  F (36.7  C) Temp src: Oral BP: 104/58 Pulse: 98   Resp: 20 SpO2: 93 % O2 Device: Oxymask Oxygen Delivery: 10 LPM  Weight: 152 lbs 14.4 oz    Gen. appearance nontoxic  Eyes no conjunctivitis or icterus  Neck no stiffness or neck vein distention, no LN  Heart tachy  Lungs crackles  On oxygen  Abdomen soft not tender  Extremities no synovitis, trace edema  Skin  no rash or emboli  Neurologic alert oriented no focal deficits        Data   Inflammatory Markers No lab results found.     Hematology Studies   Recent Labs   Lab Test 01/11/23  1015 01/10/23  2151 07/25/22  1026 03/27/22  0527 03/26/22  1946 12/24/21  0756   WBC 5.6 8.1 7.6 9.1 8.9 7.2   HGB 8.1* 9.3* 9.0* 9.1* 9.9* 11.0*   MCV 78 78 80 82 81 83    377 237 194 199 254       Metabolic Studies   Recent Labs   Lab Test 01/11/23  0900 01/10/23  2151 07/25/22  1026 03/31/22  1419 03/31/22  0504 03/28/22  0423 03/27/22  0527 03/26/22  1946    142 144  --   --   --  140 139   POTASSIUM 5.1* 4.5 4.0 4.0 3.0*   < > 4.2 4.5   CHLORIDE 102 100 101  --   --   --  104 101   CO2 26 28 30*  --   --   --  27 27 "   BUN 22 25 12.5  --   --   --  16 16   CR 0.65 0.70 0.51  --   --   --  0.65 0.69   GFRESTIMATED >90 >90 >90  --   --   --  >90 >90    < > = values in this interval not displayed.       Hepatic Studies    Recent Labs   Lab Test 01/11/23  0900 01/10/23  2151 07/25/22  1026 03/27/22  0527 03/26/22  1946 12/24/21  0756   BILITOTAL 0.5 0.5 0.4 0.2 0.3 0.3   ALKPHOS 90 101 93 84 93 120   ALBUMIN 3.1* 3.1* 4.2 2.9* 3.5 3.6   AST 45* 58* 24 18 22 15   ALT 65* 78* 14 14 15 11       Most Recent 6 Bacteria Isolates From Any Culture (See EPIC Reports for Culture Details):No lab results found.    Urine Studies  No lab results found.    Vancomycin Levels  No lab results found.    Invalid input(s): VANCO    Hepatitis B Testing No lab results found.  Hepatitis C Testing     Hepatitis C Antibody   Date Value Ref Range Status   06/22/2020 Negative Negative Final     HIVTesting No lab results found.    Respiratory Virus Testing    No results found for: RS, FLUAG  COVID-19 Antibody Results, Testing for Immunity    COVID-19 Antibody Results, Testing for Immunity   No data to display.         COVID-19 PCR Results    COVID-19 PCR Results 3/26/22 1/11/23   SARS CoV2 PCR Negative Positive (A)   (A) Abnormal value       Comments are available for some flowsheets but are not being displayed.

## 2023-01-11 NOTE — PROGRESS NOTES
[facility]    Medicine Progress Note - Hospitalist Service    Date of Admission:  1/10/2023    Assessment & Plan   72 year old female with a past medical history of COPD, HTN, HLD, atrial fibrillation on warfarin, moderate aortic stenosis, history of HFpEF who presented with shortness of breath. In the ER had SVT and treated with adenosine and then cardioversion.    Admitted on 1/10 with acute hypoxic respiratory failure requiring BiPAP, initially with unclear inciting event.  Noted to have a moderate right pleural effusion and underwent therapeutic thoracentesis on 1/11 with 1 L transitive fluid removed.  Initially started on ceftriaxone and azithromycin for possible pneumonia.  TTE was done which noted EF 55%, borderline hypokinesis of LV, aortic valve calcifications with moderate aortic stenosis.    Patient also noted to be COVID-19 positive on admission; but did have a COVID-19 infection approximately 2 months prior.    - Continue to wean O2 as tolerated.  Started on IV diuresis this patient potentially has acute on chronic HFpEF given shortness of breath, leg swelling.    Acute hypoxic respiratory failure  Acute on chronic HFpEF with moderate right pleural effusion  Moderate AS  -Was on 2-4L O2 via face mask this AM  -Status post thoracentesis - will f/u cytology   -Transudative fluid  -Lasix IV 20 mg BID (home dose 20 mg Daily) given that patient reports orthopnea, leg and abdominal swelling; however TTE does not appear to show significant fluid overload    Less Likely Bacterial PNA  COVID-19 Positive (less likely active infection)  -ID consulted for input regarding COVID-19, appreciate recs   - Consider checking COVID-19 CT value to see if likely culprit  -Thoracentesis fluid, going against exudative process such as bacterial pneumonia  -Ceftriaxone + Azithromycin (started 1/10); likely d/c over next 24 hours   - Feel less likely acute bacterial PNA given minimal productive cough, no  "fever  -Remdesivir (start date 1/11) for treatment/prophylaxis given positive COVID-19 status   - Was treated for COVID-19 in Nov 2022 with anti-viral per admission HPI  -BiPAP PRN    COPD  With possible mild exacerbation  -Prednisone 40 mg Daily x5 days  -DuoNebs  -Substitute home Symbicort for Diego Kasper     SVT   In the ER. S/p adenosine 12 mg and then cardioversion.  -Telemetry  -Continue home Diltiazem  mg daily  -Monitor electrolytes    Diabetes Mellitus Type 2 without known complications  Home Regimen: Metformin 500 mg BID Last A1c: 5.8% (7/2022)  - Hold oral medication  - Moderate Dose ISS  - Target glucose 140-180    Chronic Problems  Chronic Afib and on coumadin   - pharm to manage coumadin - INR goal 2-3      HLD  - PTA meds      Chronic pain issues   - Continue home Gabapentin  - Holding home opioids       Diet: 2 Gram Sodium Diet  Fluid restriction 1800 ML FLUID    DVT Prophylaxis: Warfarin  Chairez Catheter: Not present  Lines: None     Cardiac Monitoring: None  Code Status: Full Code      Clinically Significant Risk Factors Present on Admission           # Hypercalcemia: corrected calcium is >10.1, will monitor as appropriate    # Hypoalbuminemia: Lowest albumin = 3.1 g/dL at 1/11/2023  9:00 AM, will monitor as appropriate  # Drug Induced Coagulation Defect: home medication list includes an anticoagulant medication      # Acute Respiratory Failure: Documented O2 saturation < 91%.  Continue supplemental oxygen as needed     # Overweight: Estimated body mass index is 25.44 kg/m  as calculated from the following:    Height as of this encounter: 1.651 m (5' 5\").    Weight as of this encounter: 69.4 kg (152 lb 14.4 oz).           Disposition Plan      Expected Discharge Date: 01/13/2023      Destination: home with family;home with help/services            Louis Dutta MD  Hospitalist Service  [facility]  Securely message with [employer] (more info)  Text page via Book[employer] " Nakul/Directory   ______________________________________________________________________    Interval History   Patient seen and evaluated at bedside.    Had thoracentesis this morning with 1 L removed.  After that reported some coughing.  She states that he had some mild coughing prior to coming in but no significant sputum production.  Does report orthopnea and increased swelling in her abdomen and lower extremities.  She does measure her weight, last took it about a week ago and was around 153 pounds.    Physical Exam   Vital Signs: Temp: 98.1  F (36.7  C) Temp src: Oral BP: 119/73 Pulse: 100   Resp: 19 SpO2: 92 % O2 Device: Oxymask Oxygen Delivery: 10 LPM  Weight: 152 lbs 14.4 oz    General: laying in bed, no significant distress  HEENT: NC/AT, anicteric sclera, external ears normal  Neck: supple  CV: unable to auscultate due to PAPR use, no peripheral edema  Resp: unable to auscultate due to PAPR use, no significant accessory muscle use or respiratory distress  Abdomen: soft, obese, NT, no guarding  Musculoskeletal: full range of motion  Skin: warm, dry  Neuro: alert, cranial nerves grossly intact  Psych: normal insight and judgement, appropriate speech    Medical Decision Making       55 MINUTES SPENT BY ME on the date of service doing chart review, history, exam, documentation & further activities per the note.      Data     I have personally reviewed the following data over the past 24 hrs:    5.6  \   8.1 (L)   / 286     142 102 22 /  154 (H)   5.1 (H) 26 0.65 \       ALT: 65 (H) AST: 45 (H) AP: 90 TBILI: 0.5   ALB: 3.1 (L) TOT PROTEIN: 6.2 LIPASE: N/A       Trop: N/A BNP: 224 (H)       INR:  2.48 (H) PTT:  N/A   D-dimer:  N/A Fibrinogen:  N/A       Ferritin:  N/A % Retic:  N/A LDH:  234 (H)       Imaging results reviewed over the past 24 hrs:   Recent Results (from the past 24 hour(s))   -Cardioversion External    Narrative    Fabiana Guerrero MD     1/11/2023 12:31 AM  [facility]  Hospital    -Cardioversion External    Date/Time: 1/10/2023 10:20 PM  Performed by: Fabiana Guerrero MD  Authorized by: Fabiana Guerrero MD     Risks, benefits and alternatives discussed.    ED EVALUATION:      Assessment Time: 1/10/2023 10:00 PM      I have performed an Emergency Department Evaluation including taking a   history and physical examination, this evaluation will be documented in   the electronic medical record for this ED encounter.     Indication: SVT    ASA Class: Class 3- Severe systemic disease, definite functional   limitations    Mallampati: Grade 2- soft palate, base of uvula, tonsillar pillars, and   portion of posterior pharyngeal wall visible    NPO Status: not NPO, emergent situation    UNIVERSAL PROTOCOL   Site Marked: NA  Prior Images Obtained and Reviewed:  NA  Required items: Required blood products, implants, devices and special   equipment available    Patient identity confirmed:  Verbally with patient and arm band  Patient was reevaluated immediately before administering moderate or deep   sedation or anesthesia  Confirmation Checklist:  Patient's identity using two indicators, relevant   allergies, procedure was appropriate and matched the consent or emergent   situation and correct equipment/implants were available  Time out: Immediately prior to the procedure a time out was called    Universal Protocol: the Joint Commission Universal Protocol was followed    Preparation: Patient was prepped and draped in usual sterile fashion      SEDATION  Patient Sedated: Yes    Sedation Type:  Moderate (conscious) sedation  Sedation:  Etomidate  Vital signs: Vital signs monitored during sedation      PRE-PROCEDURE DETAILS:     Electrode placement:  Anterior-posterior  Attempt one:     Cardioversion mode:  Synchronous    Waveform:  Biphasic    Shock (Joules):  200    Shock outcome:  Conversion to normal sinus rhythm  Post-procedure details:     Patient status:  Awake      PROCEDURE    Patient  Tolerance:  Patient tolerated the procedure well with no immediate   complications  Length of time physician/provider present for 1:1 monitoring during   sedation: 20   XR Chest Port 1 View    Narrative    EXAM: XR CHEST PORT 1 VIEW  LOCATION: [facility]  DATE/TIME: 1/10/2023 10:50 PM    INDICATION: dyspnea  COMPARISON: CT chest 2022      Impression    IMPRESSION: Moderate size right pleural effusion has developed with diffuse opacification of the right mid and lower lung which may represent a combination of layering pleural fluid and pneumonia or atelectasis. Small left pleural effusion and infiltrate   or atelectasis left base. Advanced degenerative osteoarthritis right shoulder.   US Thoracentesis    Narrative    EXAM:   1. RIGHT THORACENTESIS  2. ULTRASOUND GUIDANCE  LOCATION: [facility]  DATE/TIME: 2023 8:44 AM    INDICATION: Pleural effusion.    PROCEDURE: Informed consent obtained. Time out performed. The chest was prepped and draped in sterile fashion. 10 mL of 1 % lidocaine was infused into the local soft tissues. Under direct ultrasound guidance, a 5 Kazakh catheter system was placed into   the pleural effusion.     1 liters of yellow fluid were removed and sent to lab, if requested.    Patient tolerated procedure well.    Ultrasound imaging was obtained and placed in the patient's permanent medical record.      Impression    IMPRESSION:  Status post right ultrasound-guided thoracentesis.    Reference CPT Code: 06680   Echocardiogram Complete   Result Value    LVEF  50-55% (borderline)    Narrative    [id number]  [id number]  [id number]  [id number]^SHIRA^[place] [id number]     Name: ALVAREZ HINDS  MRN: [id number]  : 1950  Study Date: 2023 08:44 AM  Age: 72 yrs  Gender: Female  Patient Location: [facility]  Reason For Study: CHF, Aortic Valve Disorder  Ordering Physician: SHIRA  JAWALI  Performed By: KD     BSA: 1.8 m2  Height: 65 in  Weight: 150 lb  HR: 98  ______________________________________________________________________________  Procedure  Complete Portable Echo Adult. Definity (NDC #87842-110) given intravenously.  Technically difficult study.Extremely difficult acoustic windows despite the  use of contrast for endcardial border definition.  ______________________________________________________________________________  Interpretation Summary     Technically difficult study.Extremely difficult acoustic windows despite the  use of contrast for endcardial border definition.     1.The left ventricle is normal in size. Left ventricular function is  decreased. The ejection fraction is 50-55% (borderline). There is borderline  global hypokinesia of the left ventricle.  2. The right ventricle is mildly dilated. Mildly decreased right ventricular  systolic function  3. Severe aortic valve calcification is present. Moderate valvular aortic  stenosis.  4. The left atrium is moderately dilated.  ______________________________________________________________________________  Left Ventricle  The left ventricle is normal in size. Left ventricular function is decreased.  The ejection fraction is 50-55% (borderline). A sigmoid septum is present.  Diastolic function not assessed due to atrial fibrillation. There is  borderline global hypokinesia of the left ventricle.     Right Ventricle  The right ventricle is mildly dilated. Mildly decreased right ventricular  systolic function.     Atria  The left atrium is moderately dilated. Right atrium not well visualized. The  right atrium is moderately dilated. There is no color Doppler evidence of an  atrial shunt.     Mitral Valve  There is mild mitral annular calcification. Mitral valve leaflets appear  normal. There is trace to mild mitral regurgitation. There is no mitral valve  stenosis.     Tricuspid Valve  The tricuspid valve is not well  visualized. Right ventricular systolic  pressure could not be approximated due to inadequate tricuspid regurgitation.  No tricuspid regurgitation.     Aortic Valve  Severe aortic valve calcification is present. There is trace aortic  regurgitation. Moderate valvular aortic stenosis.     Pulmonic Valve  The pulmonic valve is not well seen, but is grossly normal. This degree of  valvular regurgitation is within normal limits. There is trace pulmonic  valvular regurgitation.     Vessels  The aorta root is normal. Normal size ascending aorta. Moderate  atherosclerotic plaque(s) in the ascending aorta. IVC diameter <2.1 cm  collapsing >50% with sniff suggests a normal RA pressure of 3 mmHg.     Pericardium  There is no pericardial effusion.     ______________________________________________________________________________  MMode/2D Measurements & Calculations  Ao root diam: 3.2 cm  LA dimension: 4.2 cm  LA/Ao: 1.3  LVOT diam: 2.3 cm  LVOT area: 4.2 cm2     LA Volume Indexed (AL/bp): 34.3 ml/m2     Time Measurements  MM HR: 98.0 BPM     Doppler Measurements & Calculations  MV E max rjai: 131.3 cm/sec  MV max P.1 mmHg  MV mean P.5 mmHg  MV V2 VTI: 31.2 cm  MVA(VTI): 1.9 cm2  MV dec time: 0.21 sec  Ao V2 max: 293.7 cm/sec  Ao max P.0 mmHg  Ao V2 mean: 247.5 cm/sec  Ao mean P.4 mmHg  Ao V2 VTI: 55.1 cm  ADALBERTO(I,D): 1.1 cm2  ADALBERTO(V,D): 0.98 cm2     LV V1 max P.9 mmHg  LV V1 max: 68.4 cm/sec  LV V1 VTI: 14.2 cm  SV(LVOT): 59.8 ml  SI(LVOT): 34.2 ml/m2  AV Raji Ratio (DI): 0.23  ADALBERTO Index (cm2/m2): 0.62  E/E' av.7  Lateral E/e': 15.9  Medial E/e': 21.6     ______________________________________________________________________________  Report approved by: Diamante Dykes 2023 12:13 PM

## 2023-01-11 NOTE — PHARMACY-ANTICOAGULATION SERVICE
Clinical Pharmacy - Warfarin Dosing Consult     Pharmacy has been consulted to manage this patient s warfarin therapy.  Indication: Atrial Fibrillation  Therapy Goal: INR 2-3  Provider/Team: AARON  Warfarin Prior to Admission: Yes  Warfarin PTA Regimen: 2.5mg Mon, Wed and Sat then 5mg daily  Significant drug interactions: IP  azithro and steroids,    INR   Date Value Ref Range Status   01/10/2023 2.48 (H) 0.85 - 1.15 Final   12/30/2022 2.0 (H) 0.9 - 1.1 Final       Recommend warfarin 2mg today.  Pharmacy will monitor Mary Kay Tejada daily and order warfarin doses to achieve specified goal.      Please contact pharmacy as soon as possible if the warfarin needs to be held for a procedure or if the warfarin goals change.

## 2023-01-11 NOTE — ED NOTES
Pt reports she would like to have th BiPap removed as it is uncomfortable. O2 decreased to 30%. Pt repositioned. Few sips of water given . No difficulty swallowing.

## 2023-01-11 NOTE — PROGRESS NOTES
[facility] Care Coordination Contact    [facility]  Ambulatory Care Coordination to Inpatient Care Management   Hand-In Communication    Date:  January 11, 2023  Name: Mary Kay Tejada is enrolled in [facility] Care Coordination program and I am the Lead Care Coordinator.  CC Contact Information:.   Payor Source: Payor: MEDICA / Plan: MEDICA D[employer] SOLUTIONS [employer] NON/FV PARTNERS / Product Type: Indemnity /   Current services in place:     Please see the CC Snaphot and Care Management Flowsheets for specific  details of this Mary Kay Tejada care plan.   Additional details/specific concerns r/t this admission:    No additional concerns at this time . Member has 2.25 hrs/day  of PCA services and homemaking services at 3 hrs weekly. She uses oxygen at night and during the day as needed.    I will follow this admission in Epic. Please feel free to contact me with questions or for further collaboration in discharge planning.    Samantha Alexandra RN, PHN   [facility]  [phone]

## 2023-01-11 NOTE — PROGRESS NOTES
Pt continue to tolerate BiPAP with settings below;       01/11/23 0327   Tech Time   $Tech Time (10 minute increments) 3   Mode: CPAP/ BiPAP/ AVAPS/ AVAPS AE   CPAP/BiPAP/ AVAPS/ AVAPS AE Mode BiPAP S/T   Equipment   Device V60   Device Serial Number [id number]   CPAP/BiPAP/Settings   $CPAP/BiPAP Subsequent completed   BIPAP/CPAP On Standby On   IPAP/EPAP (cmH2O) 10/5   Rate (breaths/min) 12   Oxygen (%) 30   Timed Inspiration (sec) 1   IPAP RISE  Settings (V60) 2   CPAP/BiPAP Patient Parameters   IPAP (cm H2O) 10 cmH2O   EPAP (cm H2O) 5 cmH2O   Pressure Support (cm H2O) 5 cmH2O   RR Total (breaths/min) 18 breaths/min   Vt (mL) 400 mL   Minute Ventilation (L/min) 7.1 L/min   Peak Inspiratory Pressure (cm H2O) 10 cmH2O   Pt.  Leak (L/min) 5 L/min   CPAP/BiPAP/AVAPS/AVAPS AE Alarms   High Pressure (cm H2O) 25 cmH2O   Low Pressure (cm H2O) 6   Low Pressure Delay (sec) 30 sec   Lo Min Vent 3   High Rate (breaths/min) 50 breaths/min   Low Rate (breaths/min) 10   Audible Alarm set at (Volume of Alarm) 8   CPAP/BiPAP/AVAPS/AVAPS AE Patient Assessment   Interface Face Mask - Medium   Skin Integrity No breakdown   Humidifier Checked N/A   RT Device Skin Assessment   Oxygen Delivery Device CPAP/BiPAP Mask   Strap Tightness Finger Allowance between head and device strap   Device Skin Interventions Taken No adjustments needed     An Buitrago, RT

## 2023-01-11 NOTE — ED TRIAGE NOTES
Pt arrives via EMS with sudden onset of SOB. Placed on cardiac monitor showing 's. Pt denies pain. She is alert and oriented x 4.      Triage Assessment     Row Name 01/10/23 2138       Triage Assessment (Adult)    Airway WDL WDL       Respiratory WDL    Respiratory WDL X;rhythm/pattern    Rhythm/Pattern, Respiratory shortness of breath       Skin Circulation/Temperature WDL    Skin Circulation/Temperature WDL WDL       Cardiac WDL    Cardiac WDL X;rhythm    Cardiac Rhythm SVT       Peripheral/Neurovascular WDL    Peripheral Neurovascular WDL WDL       Cognitive/Neuro/Behavioral WDL    Cognitive/Neuro/Behavioral WDL WDL

## 2023-01-11 NOTE — ED NOTES
Patient is off BiPAP doing well but has dyspnea with any activity. She stood at the side of bed to be weighed but became dyspneic so currently of 10 liters oxygen via xmask. Will continue to taper down o2 as able.  Michele Feliz RN  1/11/2023  12:35 PM

## 2023-01-11 NOTE — PROGRESS NOTES
Removed Bipap to assess respiratory status. She was very relieved to be off the bipap. She appears comfortable, no increased WOB or accessory muscle use, she is speaking in full sentences and very happy to have the bipap mask removed. Will continue to monitor    Pt states she has nebs PRN at home, home oxygen, and no history of LISA

## 2023-01-11 NOTE — CONSULTS
Care Management Initial Consult    General Information  Assessment completed with: Patient,    Type of CM/SW Visit: Initial Assessment    Primary Care Provider verified and updated as needed: Yes   Readmission within the last 30 days: no previous admission in last 30 days      Reason for Consult: discharge planning  Advance Care Planning: Advance Care Planning Reviewed: no concerns identified, other (see comments) (Declined Honoring Choices information)     General Information Comments: Covid++    Communication Assessment  Patient's communication style: spoken language (English or Bilingual)    Hearing Difficulty or Deaf: no   Wear Glasses or Blind: no    Cognitive  Cognitive/Neuro/Behavioral: WDL                      Living Environment:   People in home: child(david), adult     Current living Arrangements: house      Able to return to prior arrangements: yes  Living Arrangement Comments: Lives with son and his girlfriend who is also her PCA    Family/Social Support:  Care provided by: child(david), other (see comments) (PCA)  Provides care for: no one, unable/limited ability to care for self  Marital Status:   Children, PCA          Description of Support System: Supportive, Involved    Support Assessment: Adequate family and caregiver support    Current Resources:   Patient receiving home care services: No     Community Resources: County Worker, Financial/Insurance, Housekeeping/Chore Agency, PCA  Equipment currently used at home: cane, straight, glucometer, shower chair, walker, rolling  Supplies currently used at home: Diabetic Supplies, Oxygen Tubing/Supplies, Nebulizer tubing    Employment/Financial:  Employment Status: retired        Financial Concerns: No concerns identified         Lifestyle & Psychosocial Needs:  Social Determinants of Health     Tobacco Use: High Risk     Smoking Tobacco Use: Every Day     Smokeless Tobacco Use: Never     Passive Exposure: Not on file   Alcohol Use: Not on file    Financial Resource Strain: Not on file   Food Insecurity: Not on file   Transportation Needs: Not on file   Physical Activity: Not on file   Stress: Not on file   Social Connections: Not on file   Intimate Partner Violence: Not on file   Depression: Not at risk     PHQ-2 Score: 0   Housing Stability: Not on file       Functional Status:  Prior to admission patient needed assistance:   Dependent ADLs:: Ambulation-cane, Ambulation-walker, Bathing  Dependent IADLs:: Cleaning, Cooking, Laundry, Shopping, Meal Preparation, Medication Management, Transportation  Assesssment of Functional Status: Not at  functional baseline    Mental Health Status:          Chemical Dependency Status:              Values/Beliefs:  Spiritual, Cultural Beliefs, Roman [employer] Practices, Values that affect care:                 Additional Information:   Mary Kay Tejada is a 72-year-old female with a history of diabetes, fibromyalgia, paroxysmal atrial fibrillation, nicotine dependence, COPD, hypokalemia coming into the emergency department today for evaluation of dizziness.  She states she had COVID back in November and really has not felt herself since that time.  Says it for the past week she has been feeling quite dizzy.  Especially when she leans or bends over.  Slight cough.  She noted some swelling in her legs.  She decided to come in tonight because she was just not feeling well.  EMS arrival shows heart rate in the 190s, blood pressure stable.  Blood sugar in the 200s.  Brought in for further evaluation .    Patient reports she lives in a private residence with her son Heriberto and his girlfriend (Cynthia). Girlfriend is also patient s PCA but patient doesn t know how many hours of services she has per week. Son Heriberto and PCA assist with all I/ADLs. Uses a standard walker; no home care services; doesn t drive; has oxygen at 5 liters via cannula for sleep and as needed.    Patient and PCA set up medications and patient takes them on own  with some reminders. PCA assists with showers, cooking, and cleaning. Son Heriberto helps with shopping and transportation. Patient would welcome home services if recommended. Other needs pending clinical progress.      CARMEN RAMSEY RN/CM

## 2023-01-11 NOTE — PROGRESS NOTES
Cardioversion done. RT present. Pt was placed on couple of liters of oxygen with CO2 monitor. SpO2 was in the mid 90's and CO2 with in normal range. Post treatment, RT excited the room leaving pt in stable condition with MD and RN.    An Buitrago, RT

## 2023-01-11 NOTE — ED PROVIDER NOTES
EMERGENCY DEPARTMENT ENCOUNTER      NAME: Mary Kay Tejada  AGE: 72 year old female  YOB: 1950  MRN: [id number]  EVALUATION DATE & TIME: 1/10/2023  9:33 PM    PCP: Cammy Bui    ED PROVIDER: Fabiana Guerrero M.D.      Chief Complaint   Patient presents with     Tachycardia     Shortness of Breath         FINAL IMPRESSION:  1. SVT (supraventricular tachycardia) (H)    2. Pleural effusion    3. Bilateral lower extremity edema    4. Acute respiratory failure with hypoxia (H)        MEDICAL DECISION MAKING:    Pertinent Labs & Imaging studies reviewed. (See chart for details)     Patient here arrives in SVT.  She is afebrile.  Heart rate initially in the 190s.  Blood pressure appropriate 144/64.  Oxygen saturation 97% on 2 L by nasal cannula.  Patient at this time does not have any significant symptoms.  She says she has been feeling poorly for the past week, especially dizzy if she gets up.  She says at times she can feel palpitations in her heart.  She has a history of paroxysmal A. fib.  She is on warfarin for this, last checked 3 weeks ago with an INR 5 at that time.  No chest pain.  She is denying any shortness of breath.  She does note that her legs have been slightly more swollen lately.  She sleeps already at an angle, does not ever sleep flat, has not been experiencing any orthopneic symptoms at home.  Is not on oxygen at home.  Current everyday smoker, history of COPD.  Exam for patient here with lower extremity edema, tachycardia with regular rhythm.  Given how fast heart rate was, EKG done initially on arrival shows SVT at a rate of 195.  Left axis deviation.  Diffuse ST abnormality.  Tried 12 adenosine initially had slowing of heart rate down to the 90s for approximately 2 to 30 seconds and then immediately sped back up to the 190s.  Discussed with patient trying adenosine again versus cardioversion, patient elected for cardioversion.  Sedated with etomidate, shocked at 200  J and synced rhythm.  Patient had break in her SVT.  Repeat EKG shows sinus tachycardia with PVCs.  Rate of 110.  Still with left axis deviation.  ST changes have resolved.  No signs for acute ischemia.  Sending labs, chest x-ray.  Feels better after cardioversion.    Post cardioversion patient talking, no issues.  Heart rate in the low 100s.  Blood pressure remained stable 117/60.      Patient requiring more oxygen at this point.  Up to now 5 L.  X-ray here shows some significant pulmonary edema.  She does have 3+ pitting edema bilateral lower extremities.  Could be worsening, unsure how long she has been in SVT as she had minimal symptoms with that with the exception of dizziness.  I do believe that this could be driving her SVT here as well.  Her heart rate now is back to the 100s.  We will try BiPAP for patient here to see if this does help her some.  She remains awake and alert.  We will give her Lasix as well.  They stated that cannot rule out infection so we will start antibiotics as well though patient has not been complaining of any fever, chills, other infectious signs or symptoms.  Plan for admission.  Patient updated as to results and is in agreement with plan.    Medical Decision Making    History:    Supplemental history from: Documented in HPI, if applicable    External Record(s) reviewed: Documented in HPI, if applicable.    Work Up:    Chart documentation includes differential considered and any EKGs or imaging independently interpreted by provider.    In additional to work up documented, I considered the following work up: See chart documentation, if applicable.    External consultation:    Discussion of management with another provider: See chart documentation, if applicable    Complicating factors:    Care impacted by chronic illness: Chronic Lung Disease, Diabetes, Heart Disease, Hyperlipidemia, Hypertension and Smoking / Nicotine Use    Care affected by social determinants of health:  N/A    Disposition considerations: Admit.          Critical care: 0 minutes excluding separately billable procedures.  Includes bedside management, time reviewing test results, review of records, discussing the case with staff, documenting the medical record and time spent with family members (or surrogate decision makers) discussing specific treatment issues.          ED COURSE:    The importance of close follow up was discussed. We reviewed warning signs and symptoms, and I instructed Ms. Tejada to return to the emergency department immediately if she develops any new or worsening symptoms. I provided additional verbal discharge instructions. Ms. Tejada expressed understanding and agreement with this plan of care, her questions were answered, and she was discharged in stable condition.     MEDICATIONS GIVEN IN THE EMERGENCY:  Medications   cefTRIAXone (ROCEPHIN) 2 g vial to attach to  ml bag for ADULTS or NS 50 ml bag for PEDS (2 g Intravenous New Bag 1/11/23 0025)   adenosine (ADENOCARD) injection 12 mg (12 mg Intravenous Given 1/10/23 2148)   etomidate (AMIDATE) injection 10 mg (10 mg Intravenous Given 1/10/23 2202)   ondansetron (ZOFRAN) injection 4 mg (4 mg Intravenous Given 1/10/23 2200)   ondansetron (ZOFRAN) injection 4 mg (4 mg Intravenous Given 1/10/23 2318)   furosemide (LASIX) injection 40 mg (40 mg Intravenous Given 1/11/23 0017)       NEW PRESCRIPTIONS STARTED AT TODAY'S ER VISIT:  New Prescriptions    No medications on file          =================================================================    HPI    Patient information was obtained from: Patient    Use of : N/A         Mary Kay Tejada is a 72 year old female with a history of diabetes, fibromyalgia, paroxysmal atrial fibrillation, nicotine dependence, COPD, hypokalemia coming into the emergency department today for evaluation of dizziness.  She states she had COVID back in November and really has not felt herself since that  time.  Says it for the past week she has been feeling quite dizzy.  Especially when she leans or bends over.  Slight cough.  She noted some swelling in her legs.  She decided to come in tonight because she was just not feeling well.  EMS arrival shows heart rate in the 190s, blood pressure stable.  Blood sugar in the 200s.  Brought in for further evaluation.  Her only complaint here at this time is dizziness but only when she stands up.  While she is laying in bed she has no symptoms.  She does not feel any palpitations.  No chest pain or pressure.  No shortness of breath.  No dizziness or lightheadedness.  She has not had any syncopal episodes.  No fall.      REVIEW OF SYSTEMS   Review of Systems   Constitutional: Negative for fever.   HENT: Negative for congestion.    Respiratory: Negative for chest tightness and shortness of breath.    Cardiovascular: Negative for chest pain.   Gastrointestinal: Negative for abdominal pain, nausea and vomiting.   Neurological: Positive for dizziness and light-headedness. Negative for numbness and headaches.   All other systems reviewed and are negative.        PAST MEDICAL HISTORY:  Past Medical History:   Diagnosis Date     Anemia      Aortic stenosis      Atrial fibrillation (H)      Atrial flutter (H)      Benign neoplasm of adenomatous polyp     large intestine      Chronic constipation      Chronic pain syndrome      COPD (chronic obstructive pulmonary disease) (H)     Oxygen at night      Dependence on supplemental oxygen     Oxygen at noc, during the day as needed     Depression      Diabetes mellitus (H)      Dry eye syndrome      Fibromyalgia      Ganglion     left wrist     GERD (gastroesophageal reflux disease)      Hyperlipidemia      Hypertension      Hypokalemia      Infective otitis externa, unspecified     Created by Conversion      Larynx edema      Lung disease      Malignant neoplasm of vulva (H)     Created by Conversion [facility] Annotation: Apr 17 2007   [id number]  Cammy Bui:  resection per Dr. Alfonso Mane 9/06;  Replacement Utility updated for latest IMO load     Medial epicondylitis      Onychomycosis      Osteoarthritis      Peptic ulcer      Polyneuropathy      Vulvar malignant neoplasm (H)        PAST SURGICAL HISTORY:  Past Surgical History:   Procedure Laterality Date     BIOPSY BREAST Right      BIOPSY BREAST Right 01/28/2015     BIOPSY BREAST Right 1/28/2015    Procedure: RIGHT BREAST BIOPSY AFTER WIRE LOCALIZATION AT 0940;  Surgeon: Renée Soriano MD;  Location: [facility];  Service:      BIOPSY OF BREAST, INCISIONAL      Description: Incisional Breast Biopsy;  Recorded: 11/13/2007;  Comments: benign     COLONOSCOPY N/A 6/14/2019    Procedure: COLONOSCOPY;  Surgeon: Eduardo Mora MD;  Location: [facility];  Service: Gastroenterology     ESOPHAGOSCOPY, GASTROSCOPY, DUODENOSCOPY (EGD), COMBINED N/A 11/6/2018    Procedure: ESOPHAGOGASTRODUODENOSCOPY;  Surgeon: Lit Fernando MD;  Location: [facility];  Service:      HYSTERECTOMY       JOINT REPLACEMENT Left     TKA     NE ABLATE HEART DYSRHYTHM FOCUS      Description: Catheter Ablation Atrial Fibrillation;  Recorded: 07/31/2012;  Comments: 7/24/12 PVI with Dr. Gardiner and nilay to all 5 pulm veins and CTI fl ablation line as well.     ZZC SUPRACERV ABD HYSTERECTOMY      Description: Supracervical Hysterectomy;  Proc Date: 01/01/1985;  Comments: some cervix left!; ovaries intact; done for bleeding       CURRENT MEDICATIONS:      Current Facility-Administered Medications:      cefTRIAXone (ROCEPHIN) 2 g vial to attach to  ml bag for ADULTS or NS 50 ml bag for PEDS, 2 g, Intravenous, Once, Fabiana Guerrero MD, 2 g at 01/11/23 0025    Current Outpatient Medications:      ACCU-CHEK SOFTCLIX LANCETS lancets, [ACCU-CHEK SOFTCLIX LANCETS LANCETS] TEST THREE TIMES DAILY, Disp: 300 each, Rfl: 3     albuterol (PROAIR HFA/PROVENTIL HFA/VENTOLIN HFA) 108 (90 Base)  "MCG/ACT inhaler, INHALE 2 PUFFS BY MOUTH EVERY 4 HOURS AS NEEDED FOR WHEEZING, Disp: 87.1 g, Rfl: 1     atorvastatin (LIPITOR) 20 MG tablet, TAKE 1 TABLET(20 MG) BY MOUTH AT BEDTIME, Disp: 90 tablet, Rfl: 2     BD ULTRA-FINE SHORT PEN NEEDLE 31 gauge x 5/16\" Ndle, [BD ULTRA-FINE SHORT PEN NEEDLE 31 GAUGE X 5/16\" NDLE] TEST FOUR TIMES DAILY WITH MEALS AND AT BEDTIME, Disp: 400 each, Rfl: 3     blood-glucose meter (ONETOUCH VERIO IQ METER) Misc, [BLOOD-GLUCOSE METER (ONETOUCH VERIO IQ METER) MISC] Check blood sugar three times a day., Disp: 1 each, Rfl: 0     budesonide-formoterol (SYMBICORT) 160-4.5 MCG/ACT Inhaler, Inhale 2 puffs into the lungs 2 times daily, Disp: 10.2 g, Rfl: 6     diaper,brief,adult,disposable (ADULT BRIEFS - LARGE) Misc, [DIAPER,BRIEF,ADULT,DISPOSABLE (ADULT BRIEFS - LARGE) MISC] Use 3-4 daily as needed for incontinence, Disp: 120 each, Rfl: 6     diltiazem ER COATED BEADS (CARDIZEM CD/CARTIA XT) 120 MG 24 hr capsule, TAKE 1 CAPSULE(120 MG) BY MOUTH DAILY, Disp: 90 capsule, Rfl: 3     furosemide (LASIX) 20 MG tablet, TAKE 1 TABLET(20 MG) BY MOUTH DAILY AS NEEDED, Disp: 90 tablet, Rfl: 3     gabapentin (NEURONTIN) 600 MG tablet, TAKE 1 TABLET(600 MG) BY MOUTH THREE TIMES DAILY, Disp: 90 tablet, Rfl: 4     generic lancets (FINGERSTIX LANCETS), [GENERIC LANCETS (FINGERSTIX LANCETS)] Dispense brand per patient's insurance at pharmacy discretion., Disp: 300 each, Rfl: 0     ipratropium - albuterol 0.5 mg/2.5 mg/3 mL (DUONEB) 0.5-2.5 (3) MG/3ML neb solution, Take 1 vial (3 mLs) by nebulization every 4 hours as needed for shortness of breath / dyspnea or wheezing, Disp: 90 mL, Rfl: 3     meclizine (ANTIVERT) 25 MG tablet, TAKE 1 TABLET(25 MG) BY MOUTH THREE TIMES DAILY AS NEEDED FOR DIZZINESS OR NAUSEA, Disp: 45 tablet, Rfl: 5     metFORMIN (GLUCOPHAGE) 500 MG tablet, TAKE 1 TABLET(500 MG) BY MOUTH TWICE DAILY WITH MEALS, Disp: 180 tablet, Rfl: 1     naloxone (NARCAN) 4 MG/0.1ML nasal spray, Spray 1 " "spray (4 mg) into one nostril alternating nostrils once as needed for opioid reversal every 2-3 minutes until assistance arrives, Disp: 0.2 mL, Rfl: 0     nicotine (NICODERM CQ) 14 MG/24HR 24 hr patch, Place 1 patch onto the skin every 24 hours, Disp: 28 patch, Rfl: 1     nystatin (NYSTOP) powder, [NYSTATIN (NYSTOP) POWDER] Apply 1 application topically 2 (two) times a day as needed. 2-3 times to affected area(s)., Disp: 60 g, Rfl: 3     omeprazole (PRILOSEC) 20 MG DR capsule, Take 1 capsule (20 mg) by mouth 2 times daily, Disp: 180 capsule, Rfl: 3     ONETOUCH VERIO IQ test strip, USE 1 EACH AS DIRECTED THREE TIMES DAILY AS NEEDED, Disp: 300 strip, Rfl: 1     oxyCODONE IR (ROXICODONE) 10 MG tablet, Take 1 tablet (10 mg) by mouth every 6 hours as needed for moderate to severe pain, Disp: 120 tablet, Rfl: 0     sucralfate (CARAFATE) 1 GM tablet, TAKE 1 TABLET BY MOUTH FOUR TIMES DAILY(CRUSH TABLET AND MIX IT WITH A LITTLE WATER, THEN SWALLOW), Disp: 360 tablet, Rfl: 3     warfarin ANTICOAGULANT (COUMADIN) 5 MG tablet, TAKE 1/2 TO 1 TABLET BY MOUTH DAILY AS DIRECTED. ADJUST DOSE PER INR RESULTS (Patient taking differently: Take 2.5-5 mg by mouth See Admin Instructions 2.5mg Mon, wed and Saturday; 5mg ROW), Disp: 90 tablet, Rfl: 1    ALLERGIES:  Allergies   Allergen Reactions     Celebrex [Celecoxib] Rash     patient had butterfly rash - \"lupus-like\"       Latex Rash       FAMILY HISTORY:  Family History   Problem Relation Age of Onset     Heart Failure Mother      Cancer Other         paternal HX-laryngeal      Alcoholism Sister      No Known Problems Daughter      No Known Problems Maternal Grandmother      No Known Problems Maternal Grandfather      No Known Problems Paternal Grandmother      No Known Problems Paternal Grandfather      No Known Problems Maternal Aunt      No Known Problems Paternal Aunt      Alcoholism Sister      Alcoholism Brother      Alcoholism Father      Cancer Paternal Uncle         " Gastric-Alcohol     Cancer Paternal Uncle         gastric-Alcohol     Hereditary Breast and Ovarian Cancer Syndrome No family hx of      Breast Cancer No family hx of      Colon Cancer No family hx of      Endometrial Cancer No family hx of      Ovarian Cancer No family hx of        SOCIAL HISTORY:   Social History     Socioeconomic History     Marital status:    Tobacco Use     Smoking status: Every Day     Packs/day: 0.25     Types: Cigarettes     Smokeless tobacco: Never     Tobacco comments:     seen by TTS inpatient on 3/31/22   Substance and Sexual Activity     Alcohol use: Yes     Comment: Alcoholic Drinks/day: very little     Drug use: No       PHYSICAL EXAM:    Vitals: /73   Pulse (!) 138   Temp 98.1  F (36.7  C) (Oral)   Resp 23   Wt 68 kg (150 lb)   SpO2 (!) 89%   BMI 24.96 kg/m     General:. Alert and interactive, comfortable appearing.  HENT: Oropharynx without erythema or exudates. MMM.  TMs clear bilaterally.  Eyes: Pupils mid-sized and equally reactive.   Neck: Full AROM.  No midline tenderness to palpation.  Cardiovascular: Tachycardic with regular rhythm.  Peripheral pulses 2+ bilaterally.  Chest/Pulmonary: Normal work of breathing.  Slightly coarse breath sounds bilaterally.  Mild increased work of breathing.  Abdomen: Soft, nondistended. Nontender without guarding or rebound.  Back/Spine: No CVA or midline tenderness.  Extremities: Normal ROM of all major joints.  3+ pitting edema bilateral lower extremities.  Skin: Warm and dry. Normal skin color.   Neuro: Speech clear. CNs grossly intact. Moves all extremities appropriately. Strength and sensation grossly intact to all extremities.   Psych: Normal affect/mood, cooperative, memory appropriate.     LAB:  All pertinent labs reviewed and interpreted.  Labs Ordered and Resulted from Time of ED Arrival to Time of ED Departure   BASIC METABOLIC PANEL - Abnormal       Result Value    Sodium 142      Potassium 4.5      Chloride 100       Carbon Dioxide (CO2) 28      Anion Gap 14      Urea Nitrogen 25      Creatinine 0.70      Calcium 9.7      Glucose 174 (*)     GFR Estimate >90     INR - Abnormal    INR 2.48 (*)    CBC WITH PLATELETS AND DIFFERENTIAL - Abnormal    WBC Count 8.1      RBC Count 4.37      Hemoglobin 9.3 (*)     Hematocrit 34.0 (*)     MCV 78      MCH 21.3 (*)     MCHC 27.4 (*)     RDW 21.8 (*)     Platelet Count 377      % Neutrophils 65      % Lymphocytes 22      % Monocytes 10      % Eosinophils 2      % Basophils 0      % Immature Granulocytes 1      NRBCs per 100 WBC 1 (*)     Absolute Neutrophils 5.3      Absolute Lymphocytes 1.8      Absolute Monocytes 0.8      Absolute Eosinophils 0.2      Absolute Basophils 0.0      Absolute Immature Granulocytes 0.0      Absolute NRBCs 0.0     B-TYPE NATRIURETIC PEPTIDE ( EAST ONLY) - Abnormal     (*)    MAGNESIUM - Normal    Magnesium 1.8     TROPONIN I - Normal    Troponin I 0.03     COVID-19 VIRUS (CORONAVIRUS) BY PCR   BLOOD CULTURE   BLOOD CULTURE       RADIOLOGY:  XR Chest Port 1 View   Final Result   IMPRESSION: Moderate size right pleural effusion has developed with diffuse opacification of the right mid and lower lung which may represent a combination of layering pleural fluid and pneumonia or atelectasis. Small left pleural effusion and infiltrate    or atelectasis left base. Advanced degenerative osteoarthritis right shoulder.      -Cardioversion External    (Results Pending)       EKG:  See MDM  Performed at: 2136  Impression: SVT at a rate of 195.  Left axis deviation.  Diffuse ST abnormalities.  Rate: 195  Rhythm: SVT  QRS Interval: 98  QTc Interval: 454  Comparison: Comparison from March 26, 2021 sinus tachycardia with PVCs noted at that time, rate of 107.  Left anterior fascicular block.  I have independently reviewed and interpreted the EKG(s) documented above.       PROCEDURES:   [facility]    -Cardioversion External    Date/Time:  1/10/2023 10:20 PM  Performed by: Fabiana Guerrero MD  Authorized by: Fabiana Guerrero MD     Risks, benefits and alternatives discussed.    ED EVALUATION:      Assessment Time: 1/10/2023 10:00 PM      I have performed an Emergency Department Evaluation including taking a history and physical examination, this evaluation will be documented in the electronic medical record for this ED encounter.     Indication: SVT    ASA Class: Class 3- Severe systemic disease, definite functional limitations    Mallampati: Grade 2- soft palate, base of uvula, tonsillar pillars, and portion of posterior pharyngeal wall visible    NPO Status: not NPO, emergent situation    UNIVERSAL PROTOCOL   Site Marked: NA  Prior Images Obtained and Reviewed:  NA  Required items: Required blood products, implants, devices and special equipment available    Patient identity confirmed:  Verbally with patient and arm band  Patient was reevaluated immediately before administering moderate or deep sedation or anesthesia  Confirmation Checklist:  Patient's identity using two indicators, relevant allergies, procedure was appropriate and matched the consent or emergent situation and correct equipment/implants were available  Time out: Immediately prior to the procedure a time out was called    Universal Protocol: the Joint Commission Universal Protocol was followed    Preparation: Patient was prepped and draped in usual sterile fashion      SEDATION  Patient Sedated: Yes    Sedation Type:  Moderate (conscious) sedation  Sedation:  Etomidate  Vital signs: Vital signs monitored during sedation      PRE-PROCEDURE DETAILS:     Electrode placement:  Anterior-posterior  Attempt one:     Cardioversion mode:  Synchronous    Waveform:  Biphasic    Shock (Joules):  200    Shock outcome:  Conversion to normal sinus rhythm  Post-procedure details:     Patient status:  Awake      PROCEDURE    Patient Tolerance:  Patient tolerated the procedure well with no immediate  complications  Length of time physician/provider present for 1:1 monitoring during sedation: 20        Critical Care     Performed by: Dr Abril Guerrero  Authorized by: Dr Abril Guerrero  Total critical care time: 60 minutes  Critical care was necessary to treat or prevent imminent or life-threatening deterioration of the following conditions:  Acute hypoxic respiratory failure, SVT  Critical care was time spent personally by me on the following activities: development of treatment plan with patient or surrogate, discussions with consultants, examination of patient, evaluation of patient's response to treatment, obtaining history from patient or surrogate, ordering and performing treatments and interventions, ordering and review of laboratory studies, ordering and review of radiographic studies, re-evaluation of patient's condition and monitoring for potential decompensation.  Critical care time was exclusive of separately billable procedures and treating other patients.    Fabiana Guerrero M.D.  Emergency Medicine  [facility] EMERGENCY ROOM  [address] [facility] [address]  [phone]  Dept: [phone]     Fabiana Guerrero MD  01/11/23 0031

## 2023-01-11 NOTE — H&P
[facility] MEDICINE ADMISSION HISTORY AND PHYSICAL       Assessment & Plan      1. Acute respiratory failure requiring BiPAP - FIO2 is 45% with 98% O2 sat -- comfortable -- Acute/chronic SOB, possible PNA with r/o paraneumonic effusion - History of COPD and likely in exacerbation. Was given prednisone recently. COVID last Nov 2022 and was given antiviral.     Chest XR -- Moderate size right pleural effusion has developed with diffuse opacification of the right mid and lower lung which may represent a combination of layering pleural fluid and pneumonia or atelectasis. Small left pleural effusion and infiltrate    - May need to tap this fluid and send to lab - thoracentesis panel - did talk to her about this and OK for thoracentesis  - Fluid Cytology is also ordered in the panel -- we will have AM doc include in the discharge summary to follow up if results not available yet on this admission    - continue antibiotics - rocephin, and add Azithro -- if not better -- broaden   - AM labs  - nebs and cough supp  - BiPAP  - blood cultures, antigens, sputum culture   - smoking cessation    2. SVT s/p 12 mgs adenosine x1, and then cardioversion  - now in sinus  - tele  - electrolyte checks    3. Mod aortic stenosis with ADALBERTO of 1.1 - she also has chronic Afib and on coumadin.  last EF was 60-65%   - pharm to manage coumadin - INR goal 2-3   - may need to repeat ECHO if last one is more than 6 months - assess aortic stenosis    4. Has DM2  - PTA meds    5. Has HTN and HL  - PTA meds      6. Chronic pain issues - resume home meds     315A - COVID (+) -- Challenging to determine if new infection or not, since has chronic cough/SOB and had COVID in Nov 22 and was given Molnupiravir that time. Not a good historian. Though COVID positivity may remain for 60 days. Might have to consult ID for guidance if needs re-treatment.       430A -- Still tolerating BiPAP. No dizziness. HR down to 80s     VTE  prophylaxis: on coumadin   Diet:  DM  Code Status: Full   COVID vaccination: yes  Barriers to discharge: admitting clinical condition  Discharge Disposition and goals:  Unable to determine at this point, pending clinical progress and response to treatment. Patient may need transfer to SNF or ACR if unsafe to go home and needed treatment inappropriate at home setting OR may need home health care evaluation if care can be delivered at home settings. Consider referral to care manager/    PPE - I was wearing PPE when I met the patient including but not limited to - N95 mask, Gloves, and/or Safety glasses.      Care plan was created based on available information provided, including patient's condition at the time of encounter. This plan was discussed with patient and/or family members using layman's terms and have agreed to proceed.   At the end of night shift (9PM - 730A), this case will be presented to the AM Hospitalist.      It is recommended to revise care plan and review history if there is change in condition and/or new clinical information is not available during my encounter.     All or some of home medication/s were not resumed on admission due to safety reasons or contraindications. Dosing and frequency may also have been modified. Please resume/review them during your visit.     70 minutes of total visit duration and greater than 50% was spent in direct evaluation of patient and coordination of care including discussion of diagnostic test results and recommended treatment. .      Papo Boucher MD, MPH, FACP, [facility]  Internal Medicine - Hospitalist        Chief Complaint SOB      HISTORY     - I met her in ED-3. On BiPAP at 45% with O2 sat of 98%. She looked comfortable. Per ED, she came in with leg swelling and SOB. HR 200s - felt SVT. She also complained of dizziness.     - Difficult history given BiPAP use - she reported SOB for at least a week, and has been coughing. She also was given  steroid recently. She admits to smoking still but not recently. No CP. No belly pain. She said, feels better since ED.     - In the ED, her HR was 200s - felt SVT. Was given 12 mgs adenosine. Transient improvement, and back again and she ended up having cardioversion.    - Her chest XR -- Moderate size right pleural effusion has developed with diffuse opacification of the right mid and lower lung which may represent a combination of layering pleural fluid and pneumonia or atelectasis. Small left pleural effusion and infiltrate    - She was given Rocephin. WBC normal. INR is 2.4     - ROS --- Limited given BiPAP use       Past Medical History     Past Medical History:   Diagnosis Date     Anemia      Aortic stenosis      Atrial fibrillation (H)      Atrial flutter (H)      Benign neoplasm of adenomatous polyp     large intestine      Chronic constipation      Chronic pain syndrome      COPD (chronic obstructive pulmonary disease) (H)     Oxygen at night      Dependence on supplemental oxygen     Oxygen at noc, during the day as needed     Depression      Diabetes mellitus (H)      Dry eye syndrome      Fibromyalgia      Ganglion     left wrist     GERD (gastroesophageal reflux disease)      Hyperlipidemia      Hypertension      Hypokalemia      Infective otitis externa, unspecified     Created by Conversion      Larynx edema      Lung disease      Malignant neoplasm of vulva (H)     Created by Conversion [facility] Annotation: Apr 17 2007  8:24AM - Cammy Bui:  resection per Dr. Alfonso Mane 9/06;  Replacement Utility updated for latest IMO load     Medial epicondylitis      Onychomycosis      Osteoarthritis      Peptic ulcer      Polyneuropathy      Vulvar malignant neoplasm (H)          Surgical History     Past Surgical History:   Procedure Laterality Date     BIOPSY BREAST Right      BIOPSY BREAST Right 01/28/2015     BIOPSY BREAST Right 1/28/2015    Procedure: RIGHT BREAST BIOPSY AFTER WIRE  LOCALIZATION AT 0940;  Surgeon: Renée Soriano MD;  Location: [facility];  Service:      BIOPSY OF BREAST, INCISIONAL      Description: Incisional Breast Biopsy;  Recorded: 11/13/2007;  Comments: benign     COLONOSCOPY N/A 6/14/2019    Procedure: COLONOSCOPY;  Surgeon: Eduardo Mora MD;  Location: [facility] OR;  Service: Gastroenterology     ESOPHAGOSCOPY, GASTROSCOPY, DUODENOSCOPY (EGD), COMBINED N/A 11/6/2018    Procedure: ESOPHAGOGASTRODUODENOSCOPY;  Surgeon: Lit Fernando MD;  Location: [facility] OR;  Service:      HYSTERECTOMY       JOINT REPLACEMENT Left     TKA     TX ABLATE HEART DYSRHYTHM FOCUS      Description: Catheter Ablation Atrial Fibrillation;  Recorded: 07/31/2012;  Comments: 7/24/12 PVI with Dr. Gardiner and nilay to all 5 pulm veins and CTI fl ablation line as well.     ZZC SUPRACERV ABD HYSTERECTOMY      Description: Supracervical Hysterectomy;  Proc Date: 01/01/1985;  Comments: some cervix left!; ovaries intact; done for bleeding        Family History      Family History   Problem Relation Age of Onset     Heart Failure Mother      Cancer Other         paternal HX-laryngeal      Alcoholism Sister      No Known Problems Daughter      No Known Problems Maternal Grandmother      No Known Problems Maternal Grandfather      No Known Problems Paternal Grandmother      No Known Problems Paternal Grandfather      No Known Problems Maternal Aunt      No Known Problems Paternal Aunt      Alcoholism Sister      Alcoholism Brother      Alcoholism Father      Cancer Paternal Uncle         Gastric-Alcohol     Cancer Paternal Uncle         gastric-Alcohol     Hereditary Breast and Ovarian Cancer Syndrome No family hx of      Breast Cancer No family hx of      Colon Cancer No family hx of      Endometrial Cancer No family hx of      Ovarian Cancer No family hx of          Social History      .  Social History     Socioeconomic History     Marital status:      Spouse name: Not  "on file     Number of children: Not on file     Years of education: Not on file     Highest education level: Not on file   Occupational History     Not on file   Tobacco Use     Smoking status: Every Day     Packs/day: 0.25     Types: Cigarettes     Smokeless tobacco: Never     Tobacco comments:     seen by TTS inpatient on 3/31/22   Substance and Sexual Activity     Alcohol use: Yes     Comment: Alcoholic Drinks/day: very little     Drug use: No     Sexual activity: Not on file   Other Topics Concern     Not on file   Social History Narrative     Not on file     Social Determinants of Health     Financial Resource Strain: Not on file   Food Insecurity: Not on file   Transportation Needs: Not on file   Physical Activity: Not on file   Stress: Not on file   Social Connections: Not on file   Intimate Partner Violence: Not on file   Housing Stability: Not on file          Allergies        Allergies   Allergen Reactions     Celebrex [Celecoxib] Rash     patient had butterfly rash - \"lupus-like\"       Latex Rash         Prior to Admission Medications      No current facility-administered medications on file prior to encounter.  ACCU-CHEK SOFTCLIX LANCETS lancets, [ACCU-CHEK SOFTCLIX LANCETS LANCETS] TEST THREE TIMES DAILY  albuterol (PROAIR HFA/PROVENTIL HFA/VENTOLIN HFA) 108 (90 Base) MCG/ACT inhaler, INHALE 2 PUFFS BY MOUTH EVERY 4 HOURS AS NEEDED FOR WHEEZING  atorvastatin (LIPITOR) 20 MG tablet, TAKE 1 TABLET(20 MG) BY MOUTH AT BEDTIME  BD ULTRA-FINE SHORT PEN NEEDLE 31 gauge x 5/16\" Ndle, [BD ULTRA-FINE SHORT PEN NEEDLE 31 GAUGE X 5/16\" NDLE] TEST FOUR TIMES DAILY WITH MEALS AND AT BEDTIME  blood-glucose meter (ONETOUCH VERIO IQ METER) Misc, [BLOOD-GLUCOSE METER (ONETOUCH VERIO IQ METER) MISC] Check blood sugar three times a day.  budesonide-formoterol (SYMBICORT) 160-4.5 MCG/ACT Inhaler, Inhale 2 puffs into the lungs 2 times daily  diaper,brief,adult,disposable (ADULT BRIEFS - LARGE) Misc, " [DIAPER,BRIEF,ADULT,DISPOSABLE (ADULT BRIEFS - LARGE) MISC] Use 3-4 daily as needed for incontinence  diltiazem ER COATED BEADS (CARDIZEM CD/CARTIA XT) 120 MG 24 hr capsule, TAKE 1 CAPSULE(120 MG) BY MOUTH DAILY  furosemide (LASIX) 20 MG tablet, TAKE 1 TABLET(20 MG) BY MOUTH DAILY AS NEEDED  gabapentin (NEURONTIN) 600 MG tablet, TAKE 1 TABLET(600 MG) BY MOUTH THREE TIMES DAILY  generic lancets (FINGERSTIX LANCETS), [GENERIC LANCETS (FINGERSTIX LANCETS)] Dispense brand per patient's insurance at pharmacy discretion.  ipratropium - albuterol 0.5 mg/2.5 mg/3 mL (DUONEB) 0.5-2.5 (3) MG/3ML neb solution, Take 1 vial (3 mLs) by nebulization every 4 hours as needed for shortness of breath / dyspnea or wheezing  meclizine (ANTIVERT) 25 MG tablet, TAKE 1 TABLET(25 MG) BY MOUTH THREE TIMES DAILY AS NEEDED FOR DIZZINESS OR NAUSEA  metFORMIN (GLUCOPHAGE) 500 MG tablet, TAKE 1 TABLET(500 MG) BY MOUTH TWICE DAILY WITH MEALS  naloxone (NARCAN) 4 MG/0.1ML nasal spray, Spray 1 spray (4 mg) into one nostril alternating nostrils once as needed for opioid reversal every 2-3 minutes until assistance arrives  nicotine (NICODERM CQ) 14 MG/24HR 24 hr patch, Place 1 patch onto the skin every 24 hours  nystatin (NYSTOP) powder, [NYSTATIN (NYSTOP) POWDER] Apply 1 application topically 2 (two) times a day as needed. 2-3 times to affected area(s).  omeprazole (PRILOSEC) 20 MG DR capsule, Take 1 capsule (20 mg) by mouth 2 times daily  ONETOUCH VERIO IQ test strip, USE 1 EACH AS DIRECTED THREE TIMES DAILY AS NEEDED  oxyCODONE IR (ROXICODONE) 10 MG tablet, Take 1 tablet (10 mg) by mouth every 6 hours as needed for moderate to severe pain  sucralfate (CARAFATE) 1 GM tablet, TAKE 1 TABLET BY MOUTH FOUR TIMES DAILY(CRUSH TABLET AND MIX IT WITH A LITTLE WATER, THEN SWALLOW)  warfarin ANTICOAGULANT (COUMADIN) 5 MG tablet, TAKE 1/2 TO 1 TABLET BY MOUTH DAILY AS DIRECTED. ADJUST DOSE PER INR RESULTS (Patient taking differently: Take 2.5-5 mg by mouth See  Admin Instructions 2.5mg Mon, wed and Saturday; 5mg ROW)            Review of Systems     A 12 point comprehensive review of systems was negative except as noted above in HPI.    PHYSICAL EXAMINATION       Vitals      Vitals: /73   Pulse (!) 138   Temp 98.1  F (36.7  C) (Oral)   Resp 23   Wt 68 kg (150 lb)   SpO2 (!) 89%   BMI 24.96 kg/m    BMI= Body mass index is 24.96 kg/m .      Examination     General Appearance:  Alert, cooperative, no distress  Head:    Normocephalic, without obvious abnormality, atraumatic  EENT:  PERRL, conjunctiva/corneas clear, EOM's intact.   Neck:   Supple, symmetrical, trachea midline, no adenopathy; no NVE  Back:  Symmetric, no curvature, no CVA tenderness  Chest/Lungs: decreased air entry, mild wheezing. respirations unlabored, No tenderness or deformity. No abdominal breathing or use of accessory muscles.   Heart:    Regular rate and rhythm, S1 and S2 normal, no murmur, rub   or gallop  Abdomen: Soft, non-tender, bowel sounds active all four quadrants, not peritoneal on palpation. Not distended  Extremities: milka leg swelling   Skin:  Skin color, texture, turgor normal, no rashes or lesion  Neurologic:  Awake and alert, No lateralizing or localizing signs. Moves all extremities.          Pertinent Lab     Results for orders placed or performed during the hospital encounter of 01/10/23   XR Chest Port 1 View    Impression    IMPRESSION: Moderate size right pleural effusion has developed with diffuse opacification of the right mid and lower lung which may represent a combination of layering pleural fluid and pneumonia or atelectasis. Small left pleural effusion and infiltrate   or atelectasis left base. Advanced degenerative osteoarthritis right shoulder.   Basic metabolic panel   Result Value Ref Range    Sodium 142 136 - 145 mmol/L    Potassium 4.5 3.5 - 5.0 mmol/L    Chloride 100 98 - 107 mmol/L    Carbon Dioxide (CO2) 28 22 - 31 mmol/L    Anion Gap 14 5 - 18 mmol/L     Urea Nitrogen 25 8 - 28 mg/dL    Creatinine 0.70 0.60 - 1.10 mg/dL    Calcium 9.7 8.5 - 10.5 mg/dL    Glucose 174 (H) 70 - 125 mg/dL    GFR Estimate >90 >60 mL/min/1.73m2   Result Value Ref Range    Magnesium 1.8 1.8 - 2.6 mg/dL   Result Value Ref Range    INR 2.48 (H) 0.85 - 1.15   CBC with platelets and differential   Result Value Ref Range    WBC Count 8.1 4.0 - 11.0 10e3/uL    RBC Count 4.37 3.80 - 5.20 10e6/uL    Hemoglobin 9.3 (L) 11.7 - 15.7 g/dL    Hematocrit 34.0 (L) 35.0 - 47.0 %    MCV 78 78 - 100 fL    MCH 21.3 (L) 26.5 - 33.0 pg    MCHC 27.4 (L) 31.5 - 36.5 g/dL    RDW 21.8 (H) 10.0 - 15.0 %    Platelet Count 377 150 - 450 10e3/uL    % Neutrophils 65 %    % Lymphocytes 22 %    % Monocytes 10 %    % Eosinophils 2 %    % Basophils 0 %    % Immature Granulocytes 1 %    NRBCs per 100 WBC 1 (H) <1 /100    Absolute Neutrophils 5.3 1.6 - 8.3 10e3/uL    Absolute Lymphocytes 1.8 0.8 - 5.3 10e3/uL    Absolute Monocytes 0.8 0.0 - 1.3 10e3/uL    Absolute Eosinophils 0.2 0.0 - 0.7 10e3/uL    Absolute Basophils 0.0 0.0 - 0.2 10e3/uL    Absolute Immature Granulocytes 0.0 <=0.4 10e3/uL    Absolute NRBCs 0.0 10e3/uL   B-Type Natriuretic Peptide ([facility] Only)   Result Value Ref Range     (H) 0 - 127 pg/mL   Troponin I (now)   Result Value Ref Range    Troponin I 0.03 0.00 - 0.29 ng/mL   ECG 12-LEAD WITH MUSE ([facility])   Result Value Ref Range    Systolic Blood Pressure 144 mmHg    Diastolic Blood Pressure 64 mmHg    Ventricular Rate 195 BPM    Atrial Rate 192 BPM    FL Interval  ms    QRS Duration 98 ms     ms    QTc 454 ms    P Axis  degrees    R AXIS -62 degrees    T Axis 101 degrees    Interpretation ECG       Supraventricular tachycardia  Left axis deviation  Septal infarct (cited on or before 26-MAR-2022)  ST & T wave abnormality, consider lateral ischemia  Abnormal ECG  When compared with ECG of 26-MAR-2022 19:31,  Significant changes have occurred     ECG 12-LEAD WITH MUSE ([facility])   Result Value Ref Range     Systolic Blood Pressure 128 mmHg    Diastolic Blood Pressure 84 mmHg    Ventricular Rate 110 BPM    Atrial Rate 110 BPM    CT Interval 192 ms    QRS Duration 96 ms     ms    QTc 397 ms    P Axis 63 degrees    R AXIS -54 degrees    T Axis 88 degrees    Interpretation ECG       Sinus tachycardia with Premature supraventricular complexes and with occasional Premature ventricular complexes  Left axis deviation  Abnormal ECG  When compared with ECG of 10-ROMMEL-2023 21:36,  Premature ventricular complexes are now Present  Premature supraventricular complexes are now Present  Vent. rate has decreased BY  85 BPM  T wave inversion less evident in Lateral leads  Confirmed by SEE ED PROVIDER NOTE FOR, ECG INTERPRETATION ([id number]),  YUMIKO VYAS ([id number]) on 1/10/2023 10:25:07 PM             Pertinent Radiology

## 2023-01-11 NOTE — PROGRESS NOTES
TRANSITIONS OF CARE (LETICIA) LOG   LETICIA tasks should be completed by the CC within one (1) business day of notification of each transition. Follow up contact with member is required after return to their usual care setting.  Note:  If CC finds out about the transitions fifteen (15) days or more after the member has returned to their usual care setting, no LETICIA log is needed. However, the CC should check in with the member to discuss the transition process, any changes needed to the care plan and document it in a case note.    Member Name:  Mary Kay Tejada MCO Name:  [employer] Member ID#: [id number]   [place] [facility] [place] Contact:  Samantha Alexandra RN, PHN Agency/County/Care System: [facility]   Transition Communication Actions from Care Management Contact   Transition #1   Notification Date: 1/11/23 Transition Date:   1/10/23 Transition From: Home     Is this the member s usual care setting?               yes Transition To: Hospital, [facility]   Transition Type:  Unplanned  Reason for Admission/Comments:  Short of breath and swelling leg  Contact member/responsible party to offer assistance with transition Date completed: 1/11/23    Notes from conversation with the member/responsible party, provider, discharging and receiving facility (as applicable): CC contacted Hospital /discharge planner via the [employer] Transitional [facility] Hand-In Process, with community care plan included.  CC reached out to member regarding transition and offered support as needed.  Reviewed and update care plan as needed.  Notified community service providers and placed services [facility] on hold as needed.  Transition log initiated.   PCP notified of hospitalization via EMR.       Shared CC contact info, care plan/services with receiving setting--Date completed: 1/11/23   Name & Title of receiving setting contact: [facility]   Notified PCP of  transition--Date completed:  1/11/23     via  EMR  Name of PCP: Cammy Bui     Transition #2   Notification Date: 1/20/23 Transition Date:   1/19/23 Transition From: [facility]     Is this the member s usual care setting?               no Transition To: Home   Transition Type:  Planned  Reason for Admission/Comments:    Contact member/responsible party to offer assistance with transition Date completed: See information below    Notes from conversation with the member/responsible party, provider, discharging and receiving facility (as applicable): see notes below     Shared CC contact info, care plan/services with receiving setting--Date completed: NA  Name & Title of receiving setting contact: NA   Notified PCP of transition--Date completed:  1/27/23     via  EMR  Name of PCP: Cammy Bui                                 *RETURN TO USUAL CARE SETTING: *Complete tasks below when the member is discharging TO their usual care setting within one (1) business day of notification..      For situations where the Care Coordinator is notified of the discharge prior to the date of discharge, the Care Coordinator must follow up with the member or designated representative to confirm that discharge actually occurred and discuss required LETICIA tasks as outlined in the LETICIA Instructions.  (This includes situations where it may be a  new  usual care setting for the member. (i.e., a community member who decides upon permanent nursing home placement following hospitalization and rehab).    Discuss with Member/Responsible Party:    Check  Yes  - if the member, family member and/or SNF/facility staff manages the following:    If  No  provide explanation in the comments section.          Date completed: 1/27/23 Communicated with member or their designated representative about the following:  care transition process; about changes to the member s health status; plan of  care updates; education about transitions and how to prevent unplanned transitions/readmissions    Four Pillars for Optimal Transition:    Check  Yes  - if the member, family member and/or SNF/facility staff manages the following:    If  No  provide explanation in the comments section.          [x]  Yes     []  No Does the member have a follow-up appointment scheduled with primary care or specialist? (Mental health hospitalizations--the appt. should be w/in 7 days)              For mental health hospitalizations:  []  Yes     []  No     Does the member have a follow-up appointment scheduled with a mental health practitioner within 7 days of discharge?  [x]  Yes     []  No     Has a medication review been completed with member? If no, refer to PCP, home care nurse, MTM, pharmacist  [x]  Yes     []  No     Can the member manage their medications or is there a system in place to manage medications (e.g. home care set-up)?         [x]  Yes     []  No     Can the member verbalize warning signs and symptoms to watch for and how to respond?  [x]  Yes     []  No     Does the member have a copy of and understand their discharge instructions?  If no, assist to obtain copy of discharge instructions, review discharge instructions, and assist to contact PCP to discuss questions about their recent hospitalization.  [x]  Yes     []  No     Does the member have adequate food, housing and transportation?  If no, add goal and discuss additional supports available to the member                                                                                                                                                                                 [x]  Yes     []  No     Is the member safe in their home?  If no, document needs and support provided                                                                                                                                                                          []  Yes     [x]   "No     Are there any concerns of vulnerability, abuse, or neglect?  If yes, document concerns and actions taken by Care Coordinator as a mandated                                                                                                                                                                              [x]  Yes     []  No     Does the member use a Personal Health Care Record?  Check  Yes  if visit summary, discharge summary, and/or healthcare summary are being used as a PHR.                                                                                                                                                                                  [x]  Yes     []  No     Have you reviewed the discharge summary with the member? If  No  provide explanation in comments.  [x]  Yes     []  No     Have you updated the member s care plan/support plan? Add new diagnosis, medications, treatments, goals & interventions, as applicable. If No, provide explanation in comments.    Comments:  1/20/23: Attempted to reach member, not available and left voice mail.  Will call again on 1/24/23 if no call back from member.  1/24/23: Attempted to reach member, not available and left voice mail.  1/27/23: Called and spoke with member. She reports \"getting better\". She was informed that wedge pillow order was placed via [facility] and they will delivery to her at home.      Notes from conversation with the member/responsible party, provider, discharging and receiving facility (as applicable): CC contacted member and reviewed discharge summary.  Member has a follow-up appointment with PCP in 7 days: Yes, 2/6/23.   Member has had a change in condition: No  Home visit needed: No  Care plan reviewed and updated.  The following home based services [facility] were resumed.  New referrals placed: No  Transition log completed.   PCP notified of transition back to home via EMR.            Samantha Alexandra RN, " PHN   [facility]  [phone]

## 2023-01-11 NOTE — PHARMACY-ADMISSION MEDICATION HISTORY
Pharmacy Note - Admission Medication History    Pertinent Provider Information:     Prednisone 40mg daily for 5 days started 12/27/22     ______________________________________________________________________    Prior To Admission (PTA) med list completed and updated in EMR.       PTA Med List   Medication Sig Last Dose     albuterol (PROAIR HFA/PROVENTIL HFA/VENTOLIN HFA) 108 (90 Base) MCG/ACT inhaler INHALE 2 PUFFS BY MOUTH EVERY 4 HOURS AS NEEDED FOR WHEEZING 1/10/2023     atorvastatin (LIPITOR) 20 MG tablet TAKE 1 TABLET(20 MG) BY MOUTH AT BEDTIME 1/10/2023     budesonide-formoterol (SYMBICORT) 160-4.5 MCG/ACT Inhaler Inhale 2 puffs into the lungs 2 times daily 1/10/2023     diltiazem ER COATED BEADS (CARDIZEM CD/CARTIA XT) 120 MG 24 hr capsule TAKE 1 CAPSULE(120 MG) BY MOUTH DAILY 1/10/2023     furosemide (LASIX) 20 MG tablet TAKE 1 TABLET(20 MG) BY MOUTH DAILY AS NEEDED (Patient taking differently: Take 20 mg by mouth daily) 1/10/2023     gabapentin (NEURONTIN) 600 MG tablet TAKE 1 TABLET(600 MG) BY MOUTH THREE TIMES DAILY 1/10/2023     ipratropium - albuterol 0.5 mg/2.5 mg/3 mL (DUONEB) 0.5-2.5 (3) MG/3ML neb solution Take 1 vial (3 mLs) by nebulization every 4 hours as needed for shortness of breath / dyspnea or wheezing 1/10/2023     meclizine (ANTIVERT) 25 MG tablet TAKE 1 TABLET(25 MG) BY MOUTH THREE TIMES DAILY AS NEEDED FOR DIZZINESS OR NAUSEA Past Week     metFORMIN (GLUCOPHAGE) 500 MG tablet TAKE 1 TABLET(500 MG) BY MOUTH TWICE DAILY WITH MEALS 1/10/2023     naloxone (NARCAN) 4 MG/0.1ML nasal spray Spray 1 spray (4 mg) into one nostril alternating nostrils once as needed for opioid reversal every 2-3 minutes until assistance arrives Unknown     omeprazole (PRILOSEC) 20 MG DR capsule Take 20 mg by mouth daily 1/10/2023     oxyCODONE IR (ROXICODONE) 10 MG tablet Take 1 tablet (10 mg) by mouth every 6 hours as needed for moderate to severe pain 1/10/2023     sucralfate (CARAFATE) 1 GM tablet TAKE 1  TABLET BY MOUTH FOUR TIMES DAILY(CRUSH TABLET AND MIX IT WITH A LITTLE WATER, THEN SWALLOW) (Patient taking differently: Take 1 g by mouth 3 times daily) 1/10/2023     warfarin ANTICOAGULANT (COUMADIN) 5 MG tablet TAKE 1/2 TO 1 TABLET BY MOUTH DAILY AS DIRECTED. ADJUST DOSE PER INR RESULTS (Patient taking differently: Take 2.5-5 mg by mouth See Admin Instructions 2.5mg Mon, wed and Saturday; 5mg ROW) 1/10/2023       Information source(s): Patient and CareEverywhere/SureScripts  Method of interview communication: in-person    Summary of Changes to PTA Med List      Patient was asked about OTC/herbal products specifically.  PTA med list reflects this.        The information provided in this note is only as accurate as the sources available at the time of the update(s).    Thank you for the opportunity to participate in the care of this patient.    Med Odell RPH  1/11/2023 8:00 AM

## 2023-01-12 ENCOUNTER — APPOINTMENT (OUTPATIENT)
Dept: CT IMAGING | Facility: CLINIC | Age: 73
DRG: 291 | End: 2023-01-12
Attending: INTERNAL MEDICINE
Payer: COMMERCIAL

## 2023-01-12 LAB
ALBUMIN SERPL-MCNC: 2.7 G/DL (ref 3.5–5)
ALBUMIN SERPL-MCNC: 2.8 G/DL (ref 3.5–5)
ALP SERPL-CCNC: 80 U/L (ref 45–120)
ALP SERPL-CCNC: 85 U/L (ref 45–120)
ALT SERPL W P-5'-P-CCNC: 48 U/L (ref 0–45)
ALT SERPL W P-5'-P-CCNC: 50 U/L (ref 0–45)
ANION GAP SERPL CALCULATED.3IONS-SCNC: 8 MMOL/L (ref 5–18)
ANION GAP SERPL CALCULATED.3IONS-SCNC: 8 MMOL/L (ref 5–18)
AST SERPL W P-5'-P-CCNC: 23 U/L (ref 0–40)
AST SERPL W P-5'-P-CCNC: 23 U/L (ref 0–40)
ATRIAL RATE - MUSE: 192 BPM
BASE EXCESS BLDV CALC-SCNC: 12.1 MMOL/L
BILIRUB DIRECT SERPL-MCNC: 0.2 MG/DL
BILIRUB SERPL-MCNC: 0.2 MG/DL (ref 0–1)
BILIRUB SERPL-MCNC: 0.3 MG/DL (ref 0–1)
BNP SERPL-MCNC: 220 PG/ML (ref 0–127)
BUN SERPL-MCNC: 21 MG/DL (ref 8–28)
BUN SERPL-MCNC: 23 MG/DL (ref 8–28)
C REACTIVE PROTEIN LHE: 0.5 MG/DL (ref 0–?)
CALCIUM SERPL-MCNC: 8.7 MG/DL (ref 8.5–10.5)
CALCIUM SERPL-MCNC: 8.8 MG/DL (ref 8.5–10.5)
CHLORIDE BLD-SCNC: 100 MMOL/L (ref 98–107)
CHLORIDE BLD-SCNC: 100 MMOL/L (ref 98–107)
CO2 SERPL-SCNC: 34 MMOL/L (ref 22–31)
CO2 SERPL-SCNC: 34 MMOL/L (ref 22–31)
CREAT SERPL-MCNC: 0.62 MG/DL (ref 0.6–1.1)
CREAT SERPL-MCNC: 0.65 MG/DL (ref 0.6–1.1)
DIASTOLIC BLOOD PRESSURE - MUSE: 64 MMHG
ENTEROCOCCUS FAECALIS: NOT DETECTED
ENTEROCOCCUS FAECIUM: NOT DETECTED
ERYTHROCYTE [DISTWIDTH] IN BLOOD BY AUTOMATED COUNT: 21.3 % (ref 10–15)
GFR SERPL CREATININE-BSD FRML MDRD: >90 ML/MIN/1.73M2
GFR SERPL CREATININE-BSD FRML MDRD: >90 ML/MIN/1.73M2
GLUCOSE BLD-MCNC: 107 MG/DL (ref 70–125)
GLUCOSE BLD-MCNC: 118 MG/DL (ref 70–125)
GLUCOSE BLDC GLUCOMTR-MCNC: 111 MG/DL (ref 70–99)
GLUCOSE BLDC GLUCOMTR-MCNC: 111 MG/DL (ref 70–99)
GLUCOSE BLDC GLUCOMTR-MCNC: 155 MG/DL (ref 70–99)
GLUCOSE BLDC GLUCOMTR-MCNC: 84 MG/DL (ref 70–99)
GLUCOSE BLDC GLUCOMTR-MCNC: 88 MG/DL (ref 70–99)
HCO3 BLDV-SCNC: 38 MMOL/L (ref 24–30)
HCT VFR BLD AUTO: 30.7 % (ref 35–47)
HGB BLD-MCNC: 8.2 G/DL (ref 11.7–15.7)
HOLD SPECIMEN: NORMAL
INR PPP: 2.98 (ref 0.85–1.15)
INTERPRETATION ECG - MUSE: NORMAL
LISTERIA SPECIES (DETECTED/NOT DETECTED): NOT DETECTED
MAGNESIUM SERPL-MCNC: 1.7 MG/DL (ref 1.8–2.6)
MAGNESIUM SERPL-MCNC: 1.8 MG/DL (ref 1.8–2.6)
MAGNESIUM SERPL-MCNC: 2.6 MG/DL (ref 1.8–2.6)
MCH RBC QN AUTO: 21.4 PG (ref 26.5–33)
MCHC RBC AUTO-ENTMCNC: 26.7 G/DL (ref 31.5–36.5)
MCV RBC AUTO: 80 FL (ref 78–100)
MRSA DNA SPEC QL NAA+PROBE: NEGATIVE
OXYHGB MFR BLDV: 85.5 % (ref 70–75)
P AXIS - MUSE: NORMAL DEGREES
PCO2 BLDV: 67 MM HG (ref 35–50)
PH BLDV: 7.36 [PH] (ref 7.35–7.45)
PHOSPHATE SERPL-MCNC: 4.3 MG/DL (ref 2.5–4.5)
PLATELET # BLD AUTO: 304 10E3/UL (ref 150–450)
PO2 BLDV: 59 MM HG (ref 25–47)
POTASSIUM BLD-SCNC: 3.7 MMOL/L (ref 3.5–5)
POTASSIUM BLD-SCNC: 3.8 MMOL/L (ref 3.5–5)
POTASSIUM BLD-SCNC: 3.8 MMOL/L (ref 3.5–5)
PR INTERVAL - MUSE: NORMAL MS
PROT SERPL-MCNC: 5.4 G/DL (ref 6–8)
PROT SERPL-MCNC: 5.5 G/DL (ref 6–8)
QRS DURATION - MUSE: 98 MS
QT - MUSE: 252 MS
QTC - MUSE: 454 MS
R AXIS - MUSE: -62 DEGREES
RBC # BLD AUTO: 3.84 10E6/UL (ref 3.8–5.2)
SA TARGET DNA: NEGATIVE
SAO2 % BLDV: 87.1 % (ref 70–75)
SODIUM SERPL-SCNC: 142 MMOL/L (ref 136–145)
SODIUM SERPL-SCNC: 142 MMOL/L (ref 136–145)
STAPHYLOCOCCUS AUREUS: NOT DETECTED
STAPHYLOCOCCUS EPIDERMIDIS: NOT DETECTED
STAPHYLOCOCCUS LUGDUNENSIS: NOT DETECTED
STAPHYLOCOCCUS SPECIES: DETECTED
STREPTOCOCCUS AGALACTIAE: NOT DETECTED
STREPTOCOCCUS ANGINOSUS GROUP: NOT DETECTED
STREPTOCOCCUS PNEUMONIAE: NOT DETECTED
STREPTOCOCCUS PYOGENES: NOT DETECTED
STREPTOCOCCUS SPECIES: NOT DETECTED
SYSTOLIC BLOOD PRESSURE - MUSE: 144 MMHG
T AXIS - MUSE: 101 DEGREES
VENTRICULAR RATE- MUSE: 195 BPM
WBC # BLD AUTO: 10.1 10E3/UL (ref 4–11)

## 2023-01-12 PROCEDURE — 99291 CRITICAL CARE FIRST HOUR: CPT | Performed by: INTERNAL MEDICINE

## 2023-01-12 PROCEDURE — 85610 PROTHROMBIN TIME: CPT | Performed by: HOSPITALIST

## 2023-01-12 PROCEDURE — 87641 MR-STAPH DNA AMP PROBE: CPT | Performed by: HOSPITALIST

## 2023-01-12 PROCEDURE — 84132 ASSAY OF SERUM POTASSIUM: CPT | Performed by: INTERNAL MEDICINE

## 2023-01-12 PROCEDURE — 250N000009 HC RX 250: Performed by: HOSPITALIST

## 2023-01-12 PROCEDURE — 83880 ASSAY OF NATRIURETIC PEPTIDE: CPT | Performed by: INTERNAL MEDICINE

## 2023-01-12 PROCEDURE — 94660 CPAP INITIATION&MGMT: CPT

## 2023-01-12 PROCEDURE — 83735 ASSAY OF MAGNESIUM: CPT | Performed by: INTERNAL MEDICINE

## 2023-01-12 PROCEDURE — 36569 INSJ PICC 5 YR+ W/O IMAGING: CPT

## 2023-01-12 PROCEDURE — 250N000013 HC RX MED GY IP 250 OP 250 PS 637: Performed by: HOSPITALIST

## 2023-01-12 PROCEDURE — 36415 COLL VENOUS BLD VENIPUNCTURE: CPT | Performed by: INTERNAL MEDICINE

## 2023-01-12 PROCEDURE — C9113 INJ PANTOPRAZOLE SODIUM, VIA: HCPCS | Performed by: HOSPITALIST

## 2023-01-12 PROCEDURE — 999N000157 HC STATISTIC RCP TIME EA 10 MIN

## 2023-01-12 PROCEDURE — 250N000011 HC RX IP 250 OP 636: Performed by: INTERNAL MEDICINE

## 2023-01-12 PROCEDURE — 87205 SMEAR GRAM STAIN: CPT | Performed by: INTERNAL MEDICINE

## 2023-01-12 PROCEDURE — 250N000012 HC RX MED GY IP 250 OP 636 PS 637: Performed by: HOSPITALIST

## 2023-01-12 PROCEDURE — 999N000185 HC STATISTIC TRANSPORT TIME EA 15 MIN

## 2023-01-12 PROCEDURE — 200N000001 HC R&B ICU

## 2023-01-12 PROCEDURE — 94640 AIRWAY INHALATION TREATMENT: CPT | Mod: 76

## 2023-01-12 PROCEDURE — 85027 COMPLETE CBC AUTOMATED: CPT | Performed by: HOSPITALIST

## 2023-01-12 PROCEDURE — 71250 CT THORAX DX C-: CPT

## 2023-01-12 PROCEDURE — 94640 AIRWAY INHALATION TREATMENT: CPT

## 2023-01-12 PROCEDURE — 86140 C-REACTIVE PROTEIN: CPT | Performed by: INTERNAL MEDICINE

## 2023-01-12 PROCEDURE — 99233 SBSQ HOSP IP/OBS HIGH 50: CPT | Performed by: INTERNAL MEDICINE

## 2023-01-12 PROCEDURE — 272N000452 HC KIT SHRLOCK 5FR POWER PICC TRIPLE LUMEN

## 2023-01-12 PROCEDURE — 84100 ASSAY OF PHOSPHORUS: CPT | Performed by: HOSPITALIST

## 2023-01-12 PROCEDURE — 82805 BLOOD GASES W/O2 SATURATION: CPT | Performed by: INTERNAL MEDICINE

## 2023-01-12 PROCEDURE — 250N000013 HC RX MED GY IP 250 OP 250 PS 637: Performed by: INTERNAL MEDICINE

## 2023-01-12 PROCEDURE — 250N000011 HC RX IP 250 OP 636: Performed by: HOSPITALIST

## 2023-01-12 PROCEDURE — 87070 CULTURE OTHR SPECIMN AEROBIC: CPT | Performed by: INTERNAL MEDICINE

## 2023-01-12 PROCEDURE — 999N000215 HC STATISTIC HFNC ADULT NON-CPAP

## 2023-01-12 PROCEDURE — 82248 BILIRUBIN DIRECT: CPT | Performed by: HOSPITALIST

## 2023-01-12 PROCEDURE — 258N000003 HC RX IP 258 OP 636: Performed by: HOSPITALIST

## 2023-01-12 PROCEDURE — 250N000009 HC RX 250: Performed by: INTERNAL MEDICINE

## 2023-01-12 PROCEDURE — 272N000054 HC CANNULA HIGH FLOW, ADULT

## 2023-01-12 PROCEDURE — 99291 CRITICAL CARE FIRST HOUR: CPT | Performed by: HOSPITALIST

## 2023-01-12 PROCEDURE — 36415 COLL VENOUS BLD VENIPUNCTURE: CPT | Performed by: HOSPITALIST

## 2023-01-12 PROCEDURE — 80053 COMPREHEN METABOLIC PANEL: CPT | Performed by: INTERNAL MEDICINE

## 2023-01-12 RX ORDER — IPRATROPIUM BROMIDE AND ALBUTEROL SULFATE 2.5; .5 MG/3ML; MG/3ML
3 SOLUTION RESPIRATORY (INHALATION)
Status: DISCONTINUED | OUTPATIENT
Start: 2023-01-12 | End: 2023-01-16

## 2023-01-12 RX ORDER — ONDANSETRON 2 MG/ML
4 INJECTION INTRAMUSCULAR; INTRAVENOUS EVERY 6 HOURS PRN
Status: DISCONTINUED | OUTPATIENT
Start: 2023-01-12 | End: 2023-01-19 | Stop reason: HOSPADM

## 2023-01-12 RX ORDER — POTASSIUM CHLORIDE 29.8 MG/ML
20 INJECTION INTRAVENOUS ONCE
Status: COMPLETED | OUTPATIENT
Start: 2023-01-12 | End: 2023-01-12

## 2023-01-12 RX ORDER — MAGNESIUM SULFATE HEPTAHYDRATE 40 MG/ML
2 INJECTION, SOLUTION INTRAVENOUS ONCE
Status: COMPLETED | OUTPATIENT
Start: 2023-01-12 | End: 2023-01-12

## 2023-01-12 RX ORDER — FUROSEMIDE 10 MG/ML
40 INJECTION INTRAMUSCULAR; INTRAVENOUS
Status: DISCONTINUED | OUTPATIENT
Start: 2023-01-12 | End: 2023-01-12

## 2023-01-12 RX ORDER — POTASSIUM CHLORIDE 7.45 MG/ML
10 INJECTION INTRAVENOUS
Status: COMPLETED | OUTPATIENT
Start: 2023-01-12 | End: 2023-01-12

## 2023-01-12 RX ORDER — LIDOCAINE 40 MG/G
CREAM TOPICAL
Status: ACTIVE | OUTPATIENT
Start: 2023-01-12 | End: 2023-01-15

## 2023-01-12 RX ORDER — IPRATROPIUM BROMIDE AND ALBUTEROL SULFATE 2.5; .5 MG/3ML; MG/3ML
3 SOLUTION RESPIRATORY (INHALATION)
Status: DISCONTINUED | OUTPATIENT
Start: 2023-01-12 | End: 2023-01-12

## 2023-01-12 RX ORDER — DILTIAZEM HYDROCHLORIDE 30 MG/1
30 TABLET, FILM COATED ORAL EVERY 8 HOURS SCHEDULED
Status: DISCONTINUED | OUTPATIENT
Start: 2023-01-13 | End: 2023-01-12

## 2023-01-12 RX ORDER — POTASSIUM CHLORIDE 1500 MG/1
20 TABLET, EXTENDED RELEASE ORAL ONCE
Status: COMPLETED | OUTPATIENT
Start: 2023-01-12 | End: 2023-01-12

## 2023-01-12 RX ORDER — FUROSEMIDE 10 MG/ML
40 INJECTION INTRAMUSCULAR; INTRAVENOUS ONCE
Status: COMPLETED | OUTPATIENT
Start: 2023-01-12 | End: 2023-01-12

## 2023-01-12 RX ORDER — WARFARIN SODIUM 2 MG/1
2 TABLET ORAL
Status: COMPLETED | OUTPATIENT
Start: 2023-01-12 | End: 2023-01-12

## 2023-01-12 RX ORDER — MAGNESIUM OXIDE 400 MG/1
400 TABLET ORAL EVERY 4 HOURS
Status: DISCONTINUED | OUTPATIENT
Start: 2023-01-12 | End: 2023-01-12

## 2023-01-12 RX ORDER — FUROSEMIDE 10 MG/ML
40 INJECTION INTRAMUSCULAR; INTRAVENOUS EVERY 6 HOURS
Status: DISCONTINUED | OUTPATIENT
Start: 2023-01-12 | End: 2023-01-13

## 2023-01-12 RX ORDER — DILTIAZEM HYDROCHLORIDE 30 MG/1
30 TABLET, FILM COATED ORAL EVERY 8 HOURS SCHEDULED
Status: DISCONTINUED | OUTPATIENT
Start: 2023-01-12 | End: 2023-01-15

## 2023-01-12 RX ADMIN — AZITHROMYCIN MONOHYDRATE 500 MG: 500 TABLET ORAL at 22:42

## 2023-01-12 RX ADMIN — DILTIAZEM HYDROCHLORIDE 30 MG: 30 TABLET, FILM COATED ORAL at 16:17

## 2023-01-12 RX ADMIN — PREDNISONE 40 MG: 20 TABLET ORAL at 14:51

## 2023-01-12 RX ADMIN — IPRATROPIUM BROMIDE AND ALBUTEROL SULFATE 3 ML: 2.5; .5 SOLUTION RESPIRATORY (INHALATION) at 21:58

## 2023-01-12 RX ADMIN — POTASSIUM CHLORIDE 10 MEQ: 10 INJECTION, SOLUTION INTRAVENOUS at 04:12

## 2023-01-12 RX ADMIN — GABAPENTIN 600 MG: 600 TABLET, FILM COATED ORAL at 20:36

## 2023-01-12 RX ADMIN — PROCHLORPERAZINE EDISYLATE 5 MG: 5 INJECTION INTRAMUSCULAR; INTRAVENOUS at 00:40

## 2023-01-12 RX ADMIN — FLUTICASONE FUROATE AND VILANTEROL TRIFENATATE 1 PUFF: 200; 25 POWDER RESPIRATORY (INHALATION) at 14:54

## 2023-01-12 RX ADMIN — FUROSEMIDE 40 MG: 10 INJECTION, SOLUTION INTRAVENOUS at 22:08

## 2023-01-12 RX ADMIN — MAGNESIUM SULFATE HEPTAHYDRATE 2 G: 40 INJECTION, SOLUTION INTRAVENOUS at 18:52

## 2023-01-12 RX ADMIN — FUROSEMIDE 40 MG: 10 INJECTION, SOLUTION INTRAVENOUS at 10:27

## 2023-01-12 RX ADMIN — POTASSIUM CHLORIDE 20 MEQ: 29.8 INJECTION, SOLUTION INTRAVENOUS at 20:36

## 2023-01-12 RX ADMIN — IPRATROPIUM BROMIDE AND ALBUTEROL SULFATE 3 ML: 2.5; .5 SOLUTION RESPIRATORY (INHALATION) at 13:25

## 2023-01-12 RX ADMIN — ONDANSETRON 4 MG: 2 INJECTION INTRAMUSCULAR; INTRAVENOUS at 05:09

## 2023-01-12 RX ADMIN — CEFTRIAXONE SODIUM 2 G: 2 INJECTION, POWDER, FOR SOLUTION INTRAMUSCULAR; INTRAVENOUS at 22:13

## 2023-01-12 RX ADMIN — REMDESIVIR 100 MG: 100 INJECTION, POWDER, LYOPHILIZED, FOR SOLUTION INTRAVENOUS at 17:32

## 2023-01-12 RX ADMIN — WARFARIN SODIUM 2 MG: 2 TABLET ORAL at 17:39

## 2023-01-12 RX ADMIN — MAGNESIUM SULFATE HEPTAHYDRATE 2 G: 40 INJECTION, SOLUTION INTRAVENOUS at 03:30

## 2023-01-12 RX ADMIN — GABAPENTIN 600 MG: 600 TABLET, FILM COATED ORAL at 14:52

## 2023-01-12 RX ADMIN — IPRATROPIUM BROMIDE AND ALBUTEROL SULFATE 3 ML: .5; 2.5 SOLUTION RESPIRATORY (INHALATION) at 11:27

## 2023-01-12 RX ADMIN — ATORVASTATIN CALCIUM 20 MG: 10 TABLET, FILM COATED ORAL at 22:07

## 2023-01-12 RX ADMIN — POTASSIUM CHLORIDE 10 MEQ: 10 INJECTION, SOLUTION INTRAVENOUS at 06:17

## 2023-01-12 RX ADMIN — FUROSEMIDE 40 MG: 10 INJECTION, SOLUTION INTRAVENOUS at 14:45

## 2023-01-12 RX ADMIN — PANTOPRAZOLE SODIUM 40 MG: 40 INJECTION, POWDER, FOR SOLUTION INTRAVENOUS at 14:45

## 2023-01-12 RX ADMIN — SUCRALFATE 1 G: 1 TABLET ORAL at 20:36

## 2023-01-12 RX ADMIN — FUROSEMIDE 20 MG: 10 INJECTION, SOLUTION INTRAVENOUS at 04:11

## 2023-01-12 RX ADMIN — LIDOCAINE HYDROCHLORIDE 2.5 ML: 10 INJECTION, SOLUTION EPIDURAL; INFILTRATION; INTRACAUDAL; PERINEURAL at 16:35

## 2023-01-12 RX ADMIN — DILTIAZEM HYDROCHLORIDE 30 MG: 30 TABLET, FILM COATED ORAL at 22:07

## 2023-01-12 RX ADMIN — SODIUM CHLORIDE 50 ML: 9 INJECTION, SOLUTION INTRAVENOUS at 17:34

## 2023-01-12 RX ADMIN — IPRATROPIUM BROMIDE AND ALBUTEROL SULFATE 3 ML: .5; 2.5 SOLUTION RESPIRATORY (INHALATION) at 07:44

## 2023-01-12 ASSESSMENT — ACTIVITIES OF DAILY LIVING (ADL)
ADLS_ACUITY_SCORE: 47
ADLS_ACUITY_SCORE: 43
ADLS_ACUITY_SCORE: 43
ADLS_ACUITY_SCORE: 45
ADLS_ACUITY_SCORE: 41
ADLS_ACUITY_SCORE: 43
ADLS_ACUITY_SCORE: 43
ADLS_ACUITY_SCORE: 41
ADLS_ACUITY_SCORE: 47
ADLS_ACUITY_SCORE: 43
ADLS_ACUITY_SCORE: 41
ADLS_ACUITY_SCORE: 43

## 2023-01-12 NOTE — PROGRESS NOTES
[facility]  Infectious Disease   Progress Note     Date of Admission:  1/10/2023    Assessment & Plan   CHF,SVT, aortic stenosis, right pleural effusion, Transudative  Possible right sided pneumonia, versus compressive atelectasis  COVID PCR positive.  She was positive in November.  Possible reinfection versus persistent shedding from November- at any rate no typical COVID features on CT chest  Troponin normal  Active smoker  Frail elderly  BC GPCcl- significance?     PLAN  IV remdesivir, ceftriaxone azithromycin  Already on steroids for COPD  Check procalcitonin>>0.04  CT chest>>> Mostly edema  Track inflammatory markers    Mary Garcia M.D.    ______________________________________________________________________    Interval History   TRANSFERRED TO icu    Radiology personally reviewed  Ct  1. Interstitial thickening throughout both lungs, likely relating to interstitial pulmonary edema. There are also a small to moderate-sized right pleural effusion and small left pleural effusion.   2. A small region of patchy opacities in the right middle lobe, likely representing pneumonia.  3. Mild to moderate emphysematous changes in the lungs.    Physical Exam   Vital Signs: Temp: 98.1  F (36.7  C) Temp src: Axillary BP: 118/60 Pulse: 95   Resp: 18 SpO2: 90 % O2 Device: High Flow Nasal Cannula (HFNC) Oxygen Delivery: 40 LPM  Weight: 160 lbs 11.45 oz   Limited, see intensivist  Gen. appearance on oxygen  Eyes no conjunctivitis or icterus  Extremities no synovitis, trace edema  Skin  no rash  Neurologic alert oriented no focal deficits      Data   Results for orders placed or performed during the hospital encounter of 01/10/23 (from the past 24 hour(s))   Glucose by meter   Result Value Ref Range    GLUCOSE BY METER POCT 170 (H) 70 - 99 mg/dL   Procalcitonin   Result Value Ref Range    Procalcitonin 0.04 0.00 - 0.49 ng/mL   Glucose by meter   Result Value Ref Range    GLUCOSE BY METER POCT 130  (H) 70 - 99 mg/dL   Glucose by meter   Result Value Ref Range    GLUCOSE BY METER POCT 126 (H) 70 - 99 mg/dL   CT Chest w/o Contrast    Narrative    EXAM: CT CHEST WITHOUT CONTRAST  LOCATION: [facility]  DATE/TIME: 1/12/2023 1:09 AM    INDICATION: COVID infection. Congestive heart failure.  COMPARISON: 3/26/2022.    TECHNIQUE: CT chest without IV contrast. Multiplanar reformats were obtained. Dose reduction techniques were used.  CONTRAST: None.    FINDINGS:    LUNGS AND PLEURA: Mild to moderate emphysematous changes in the lungs. Interstitial thickening throughout both lungs. A small region of patchy opacities in the right middle lobe (series 5 image 158). Small to moderate-sized right pleural effusion with   adjacent atelectasis and small left pleural effusion with adjacent atelectasis.    MEDIASTINUM/AXILLAE: Mild cardiomegaly. A few nonspecific borderline enlarged mediastinal lymph nodes.    CORONARY ARTERY CALCIFICATION: Present.    MUSCULOSKELETAL: No acute findings.    UPPER ABDOMEN: Unremarkable.      Impression    IMPRESSION:   1. Interstitial thickening throughout both lungs, likely relating to interstitial pulmonary edema. There are also a small to moderate-sized right pleural effusion and small left pleural effusion.   2. A small region of patchy opacities in the right middle lobe, likely representing pneumonia.  3. Mild to moderate emphysematous changes in the lungs.   Blood gas venous   Result Value Ref Range    pH Venous 7.36 7.35 - 7.45    pCO2 Venous 67 (H) 35 - 50 mm Hg    pO2 Venous 59 (H) 25 - 47 mm Hg    Bicarbonate Venous 38 (H) 24 - 30 mmol/L    Base Excess/Deficit (+/-) 12.1   mmol/L    Oxyhemoglobin Venous 85.5 (H) 70.0 - 75.0 %    O2 Sat, Venous 87.1 (H) 70.0 - 75.0 %   Comprehensive metabolic panel   Result Value Ref Range    Sodium 142 136 - 145 mmol/L    Potassium 3.8 3.5 - 5.0 mmol/L    Chloride 100 98 - 107 mmol/L    Carbon Dioxide (CO2) 34 (H) 22 - 31 mmol/L     Anion Gap 8 5 - 18 mmol/L    Urea Nitrogen 23 8 - 28 mg/dL    Creatinine 0.65 0.60 - 1.10 mg/dL    Calcium 8.7 8.5 - 10.5 mg/dL    Glucose 107 70 - 125 mg/dL    Alkaline Phosphatase 80 45 - 120 U/L    AST 23 0 - 40 U/L    ALT 50 (H) 0 - 45 U/L    Protein Total 5.4 (L) 6.0 - 8.0 g/dL    Albumin 2.7 (L) 3.5 - 5.0 g/dL    Bilirubin Total 0.2 0.0 - 1.0 mg/dL    GFR Estimate >90 >60 mL/min/1.73m2   Magnesium   Result Value Ref Range    Magnesium 1.7 (L) 1.8 - 2.6 mg/dL   Magnesium   Result Value Ref Range    Magnesium 2.6 1.8 - 2.6 mg/dL   CBC with platelets   Result Value Ref Range    WBC Count 10.1 4.0 - 11.0 10e3/uL    RBC Count 3.84 3.80 - 5.20 10e6/uL    Hemoglobin 8.2 (L) 11.7 - 15.7 g/dL    Hematocrit 30.7 (L) 35.0 - 47.0 %    MCV 80 78 - 100 fL    MCH 21.4 (L) 26.5 - 33.0 pg    MCHC 26.7 (L) 31.5 - 36.5 g/dL    RDW 21.3 (H) 10.0 - 15.0 %    Platelet Count 304 150 - 450 10e3/uL   Basic metabolic panel   Result Value Ref Range    Sodium 142 136 - 145 mmol/L    Potassium 3.8 3.5 - 5.0 mmol/L    Chloride 100 98 - 107 mmol/L    Carbon Dioxide (CO2) 34 (H) 22 - 31 mmol/L    Anion Gap 8 5 - 18 mmol/L    Urea Nitrogen 21 8 - 28 mg/dL    Creatinine 0.62 0.60 - 1.10 mg/dL    Calcium 8.8 8.5 - 10.5 mg/dL    Glucose 118 70 - 125 mg/dL    GFR Estimate >90 >60 mL/min/1.73m2   Hepatic function panel   Result Value Ref Range    Bilirubin Total 0.3 0.0 - 1.0 mg/dL    Bilirubin Direct 0.2 <=0.5 mg/dL    Protein Total 5.5 (L) 6.0 - 8.0 g/dL    Albumin 2.8 (L) 3.5 - 5.0 g/dL    Alkaline Phosphatase 85 45 - 120 U/L    AST 23 0 - 40 U/L    ALT 48 (H) 0 - 45 U/L   Phosphorus   Result Value Ref Range    Phosphorus 4.3 2.5 - 4.5 mg/dL   B-Type Natriuretic Peptide ([facility] Only)   Result Value Ref Range     (H) 0 - 127 pg/mL   Glucose by meter   Result Value Ref Range    GLUCOSE BY METER POCT 111 (H) 70 - 99 mg/dL   Glucose by meter   Result Value Ref Range    GLUCOSE BY METER POCT 88 70 - 99 mg/dL   INR   Result Value Ref Range     INR 2.98 (H) 0.85 - 1.15   Extra Tube    Narrative    The following orders were created for panel order Extra Tube.  Procedure                               Abnormality         Status                     ---------                               -----------         ------                     Extra Green Top (Lithium...[[id number]]                                                   Please view results for these tests on the individual orders.

## 2023-01-12 NOTE — PROVIDER NOTIFICATION
"Provider Notified: \"Lab called with critical report blood culture from 1/11 grow Gram positive cocci in clusters. Please advise.\"  "

## 2023-01-12 NOTE — PLAN OF CARE
Problem: Gas Exchange Impaired  Goal: Optimal Gas Exchange  Outcome: Progressing     Patient Aox4, pleasant, cooperative. Denies pain. Currently on 10L via NC oximyzer otherwise vitally stable. Patient telemetry monitoring has flipped between NSR and a-fib. HR remains . Blood pressure stable. Bedrest d/t extreme sob with activity.

## 2023-01-12 NOTE — PROGRESS NOTES
Comfortable on BiPAP 10/5 - 60% FIO2 -- will try to wean her down to 35%.   RN concerned with low O2 sat. However, denies SOB or CP and comfortable on BiPAP    Plans - continue BiPAP over night. Will carry out chest CT as ordered at 5PM by ID, not sure why it was not done. Will get some labs. Wean down FIO2 to 35%. Discussed with RN.

## 2023-01-12 NOTE — PROGRESS NOTES
"Placed on bipap per MD, 10/5, 60%.  RT will continue to follow.       /55 (BP Location: Left arm)   Pulse 96   Temp 97.5  F (36.4  C) (Oral)   Resp 20   Ht 1.651 m (5' 5\")   Wt 69.4 kg (152 lb 14.4 oz)   SpO2 93%   BMI 25.44 kg/m      "

## 2023-01-12 NOTE — PLAN OF CARE
Goal Outcome Evaluation:      Plan of Care Reviewed With: patient    Overall Patient Progress: no changeOverall Patient Progress: no change    Pt A&Ox4. Able to make needs known. Denies chest pain. CONTE with any movement, pt denies increase work of breathing or SOB. Pt was on 10L oxymask jaime starting shift, pt started to desat closer to MN when starting to sleep and unable to really recover, pt brought to CT. Tried pt on bipap and unable to tolerated more than 15 minutes, becomes nauseous, tried HFNC and pt tolerated maybe for an hour, went back to oxymask and around 0330 pt had the same issue, educated pt that its we need to try another option again, pt wants to try bipap again. Replaced potassium and magnesium via IV per MD. Tele reads NSR-ST with PACs and PVCS, randomly pt will go into SVT for less than a minute, pt asymptomatic- informed MD in person. Will continue to monitor.          Problem: Dysrhythmia  Goal: Normalized Cardiac Rhythm  Outcome: Not Progressing     Problem: Electrolyte Imbalance  Goal: Electrolyte Imbalance: Plan of Care  Outcome: Not Progressing     Problem: Heart Failure  Goal: Optimal Coping  Outcome: Not Progressing  Goal: Optimal Cardiac Output  Outcome: Not Progressing  Goal: Stable Heart Rate and Rhythm  Outcome: Not Progressing  Goal: Optimal Functional Ability  Outcome: Not Progressing  Intervention: Optimize Functional Ability  Recent Flowsheet Documentation  Taken 1/12/2023 0400 by Yolis Gomez, RN  Activity Management: activity adjusted per tolerance  Taken 1/12/2023 0045 by Yolis Gomez RN  Activity Management: activity adjusted per tolerance  Taken 1/11/2023 1951 by Yolis Gomez RN  Activity Management: activity adjusted per tolerance  Goal: Fluid and Electrolyte Balance  Outcome: Not Progressing  Goal: Improved Oral Intake  Outcome: Not Progressing  Goal: Effective Oxygenation and Ventilation  Outcome: Not Progressing  Intervention: Promote Airway Secretion  Clearance  Recent Flowsheet Documentation  Taken 1/12/2023 0400 by Yolis Gomez RN  Cough And Deep Breathing: done with encouragement  Activity Management: activity adjusted per tolerance  Taken 1/12/2023 0045 by Yolis Gomez RN  Cough And Deep Breathing: done with encouragement  Activity Management: activity adjusted per tolerance  Taken 1/11/2023 1951 by Yolis Gomez RN  Cough And Deep Breathing: done with encouragement  Activity Management: activity adjusted per tolerance  Intervention: Optimize Oxygenation and Ventilation  Recent Flowsheet Documentation  Taken 1/12/2023 0400 by Yolis Gomez RN  Head of Bed (HOB) Positioning: HOB at 30-45 degrees  Taken 1/12/2023 0045 by Yolis Gomez RN  Head of Bed (HOB) Positioning: HOB at 30-45 degrees  Taken 1/11/2023 1951 by Yolis Gomez RN  Head of Bed (HOB) Positioning: HOB at 30-45 degrees  Goal: Effective Breathing Pattern During Sleep  Outcome: Not Progressing  Intervention: Monitor and Manage Obstructive Sleep Apnea  Recent Flowsheet Documentation  Taken 1/12/2023 0400 by Yolis Gomez RN  Medication Review/Management: medications reviewed  Taken 1/12/2023 0045 by Yolis Gomez RN  Medication Review/Management: medications reviewed  Taken 1/11/2023 1951 by Yolis Gomez RN  Medication Review/Management: medications reviewed     Problem: Gas Exchange Impaired  Goal: Optimal Gas Exchange  1/12/2023 0602 by Yolis Gomez RN  Outcome: Not Progressing  1/12/2023 0601 by Yolis Gomez RN  Outcome: Progressing  Intervention: Optimize Oxygenation and Ventilation  Recent Flowsheet Documentation  Taken 1/12/2023 0400 by Yolis Gomez RN  Head of Bed (HOB) Positioning: HOB at 30-45 degrees  Taken 1/12/2023 0045 by Yolis Gomez RN  Head of Bed (HOB) Positioning: HOB at 30-45 degrees  Taken 1/11/2023 1951 by Yolis Gomez RN  Head of Bed (HOB) Positioning: HOB at 30-45 degrees

## 2023-01-12 NOTE — PROGRESS NOTES
[facility]    Medicine Progress Note - Hospitalist Service    Date of Admission:  1/10/2023    Assessment & Plan   72 year old female with a past medical history of COPD, HTN, HLD, atrial fibrillation on warfarin, moderate aortic stenosis, history of HFpEF who presented with shortness of breath. In the ER had SVT and treated with adenosine and then cardioversion.    Admitted on 1/10 with acute hypoxic respiratory failure requiring BiPAP, initially with unclear inciting event.  Noted to have a moderate right pleural effusion and underwent therapeutic thoracentesis on 1/11 with 1 L transitive fluid removed.  Initially started on ceftriaxone and azithromycin for possible community-acquired pneumonia.  TTE was done which noted EF 55%, borderline hypokinesis of LV, aortic valve calcifications with moderate aortic stenosis.  It was then felt the patient was fluid overloaded given her symptoms of lower extremity edema and was started on IV diuresis on 1/11.    Patient also noted to be COVID-19 positive on admission; but did report having a COVID-19 infection approximately 2 months prior.  Was started on remdesivir although felt less likely that COVID-19 is contributing to her respiratory status.    Patient had a CT chest done on 1/11 which noted bilateral pleural effusions and interstitial thickening consistent with CHF, and small patchy opacities in right middle lobe possibly representing pneumonia. On 1/12 had increasing O2 requirements, up to 100% FiO2 BiPAP.  Subsequent transfer to ICU for closer monitoring.    - Continue to wean O2 as tolerated.  Continue with IV diuresis for CHF.    Acute hypoxic respiratory failure  Acute on chronic HFpEF with moderate right pleural effusion s/p Thoracentesis  Bilateral Pleural Effusions  Moderate AS  -Up to 100% FiO2 on BiPAP although appears comfortable  -ICU consulted, appreciate recs  -Status post thoracentesis on 1/11, transudative fluid per lights  criteria   -Fluid cultures negative so far  -Lasix IV 40 mg q6H (home dose 20 mg Daily) as still volume overloaded    Less Likely Bacterial PNA  COVID-19 Positive (less likely active infection)  -ID consulted, appreciate recs  -Thoracentesis fluid, going against exudative process such as bacterial pneumonia  -Ceftriaxone + Azithromycin (started 1/10)   - Feel less likely bacterial pneumonia although given high O2 needs will continue at this time, likely short course  -Remdesivir (start date 1/11) for treatment/prophylaxis given positive COVID-19 status   - Was treated for COVID-19 in Nov 2022 with anti-viral per admission HPI  -BiPAP PRN    Positive Blood Culture  1 of 4 positive for GPC in cluster at 2 days, likely contaminant given no fevers.  - If other cultures positive will broaden  - Check MRSA PCR    COPD  With possible mild exacerbation  -Prednisone 40 mg Daily x5 days  -DuoNebs  -Substitute home Symbicort for Diego Kasper     SVT   In the ER. S/p adenosine 12 mg and then cardioversion.  -Telemetry  -Continue home Diltiazem  mg daily  -Monitor electrolytes    Diabetes Mellitus Type 2 without known complications  Home Regimen: Metformin 500 mg BID Last A1c: 5.8% (7/2022)  - Hold oral medication  - Moderate Dose ISS  - Target glucose 140-180    Chronic Problems  Chronic Afib and on coumadin   - Pharm to manage coumadin, appreciate recs  - INR goal 2-3      HLD  - PTA meds      Chronic pain issues   - Continue home Gabapentin  - Holding home opioids       Diet: Fluid restriction 1800 ML FLUID  NPO for Medical/Clinical Reasons Except for: Meds, Ice Chips    DVT Prophylaxis: Warfarin  Chairez Catheter: Not present  Lines: None     Cardiac Monitoring: None  Code Status: Full Code      Clinically Significant Risk Factors           # Hypercalcemia: corrected calcium is >10.1, will monitor as appropriate    # Hypoalbuminemia: Lowest albumin = 2.7 g/dL at 1/12/2023  1:55 AM, will monitor as appropriate           "  # Overweight: Estimated body mass index is 26.74 kg/m  as calculated from the following:    Height as of this encounter: 1.651 m (5' 5\").    Weight as of this encounter: 72.9 kg (160 lb 11.5 oz)., PRESENT ON ADMISSION         Disposition Plan      Expected Discharge Date: 01/16/2023      Destination: home with family;home with help/services  Discharge Comments: Pt on Bipap %. Transferring to ICU.          Louis Dutta MD  Hospitalist Service  [facility]  Securely message with [employer] (more info)  Text page via [facility] Paging/Directory   ______________________________________________________________________    Interval History   Patient seen and evaluated at bedside.  Patient's family were also present bedside.    Patient does report feeling a bit short of breath, does not have much of a cough but will occasionally bring up some sputum.  Patient was on BiPAP at time examination although did not have significant work of breathing.  Denies any chest pain.    Physical Exam   Vital Signs: Temp: 98.8  F (37.1  C) Temp src: Oral BP: (!) 124/93 Pulse: 85   Resp: 13 SpO2: (!) 88 % O2 Device: High Flow Nasal Cannula (HFNC) Oxygen Delivery: 40 LPM  Weight: 160 lbs 11.45 oz    General: NAD sitting up in bed  HEENT: NC/AT, anicteric sclera, external ears normal  Neck: supple  CV: unable to auscultate due to PAPR use, no peripheral edema  Resp: unable to auscultate due to PAPR use, no significant accessory muscle use or respiratory distress  Abdomen: soft, NT, no guarding, mild distension  Skin: warm, dry  Neuro: alert, cranial nerves grossly intact  Psych: normal insight and judgement, appropriate speech    Medical Decision Making       **CLEAR ALL SELECTIONS**       IS PATIENT CRITICALLY ILL ?  Is there a high potential of sudden, clinically significant, or life threatening deterioration? Yes  Is there a need for direct personal assessment and management to treat/prevent multiple vital " organ  failure/deterioration? Yes  If this patient is not critically ill, the reason for continued hospitalization is hypoxia.    PATIENT IS CRITICALLY ILL WITH THESE DIAGNOSES BEING MANAGED BY HOSPITAL  MEDICINE:  Acute Hypoxic Respiratory Failure    I personally performed 40 minutes of aggregate critical care time exclusive of procedures and  teaching. This includes time spent during direct patient evaluation and reassessment, interpreting  diagnostic tests, directing life and/or organ supporting interventions and documentation on the unit.    Data     I have personally reviewed the following data over the past 24 hrs:    10.1  \   8.2 (L)   / 304     142 100 21 /  84   3.8 34 (H) 0.62 \       ALT: 48 (H) AST: 23 AP: 85 TBILI: 0.3   ALB: 2.8 (L) TOT PROTEIN: 5.5 (L) LIPASE: N/A       Trop: N/A BNP: 220 (H)       Procal: 0.04 CRP: 0.5 Lactic Acid: N/A       INR:  2.98 (H) PTT:  N/A   D-dimer:  N/A Fibrinogen:  N/A       Imaging results reviewed over the past 24 hrs:   Recent Results (from the past 24 hour(s))   CT Chest w/o Contrast    Narrative    EXAM: CT CHEST WITHOUT CONTRAST  LOCATION: [facility]  DATE/TIME: 1/12/2023 1:09 AM    INDICATION: COVID infection. Congestive heart failure.  COMPARISON: 3/26/2022.    TECHNIQUE: CT chest without IV contrast. Multiplanar reformats were obtained. Dose reduction techniques were used.  CONTRAST: None.    FINDINGS:    LUNGS AND PLEURA: Mild to moderate emphysematous changes in the lungs. Interstitial thickening throughout both lungs. A small region of patchy opacities in the right middle lobe (series 5 image 158). Small to moderate-sized right pleural effusion with   adjacent atelectasis and small left pleural effusion with adjacent atelectasis.    MEDIASTINUM/AXILLAE: Mild cardiomegaly. A few nonspecific borderline enlarged mediastinal lymph nodes.    CORONARY ARTERY CALCIFICATION: Present.    MUSCULOSKELETAL: No acute findings.    UPPER ABDOMEN:  Unremarkable.      Impression    IMPRESSION:   1. Interstitial thickening throughout both lungs, likely relating to interstitial pulmonary edema. There are also a small to moderate-sized right pleural effusion and small left pleural effusion.   2. A small region of patchy opacities in the right middle lobe, likely representing pneumonia.  3. Mild to moderate emphysematous changes in the lungs.

## 2023-01-12 NOTE — PROCEDURES
"PICC Line Insertion Procedure Note    Pt. Name:   Mary Kay Tejada  MRN:          [id number]    Procedure:     Insertion of a  TRIPLE Lumen  5 fr  Bard SOLO (valved) Power PICC, Lot number [id number]    Indications: Vascular Access; Medication drips    Contraindications : None    Procedure Details:    Patient identified with 2 identifiers and \"Time Out\" conducted.    Central line insertion bundle followed: Hand hygiene performed prior to procedure, site cleansed with Cholraprep (CHG), hat, mask, sterile gloves, sterile gown worn, patient draped with maximum barrier head to toe drape, sterile field maintained.    The right upper arm BASILIC vein was assessed by ultrasound and found to be anechoic, compressible, patent, and of adequate size.     Lidocaine 1% 2.5 ml administered SQ to the insertion site.     Modified Seldinger Technique (MST) used for insertion, ONE attempt(s) required to access vein. Imaging during access shows the needle within the vein.     PICC was advanced through peel-away sheath.    Catheter threaded without difficulty. The tip of the catheter was positioned in the SVC. Good blood return noted.    Catheter was flushed with 30 cc normal saline.     Catheter secured with Statlock, tissue adhesive (on insertion site only), Biopatch (CHG), and Tegaderm dressing applied.    The sharps that are included in the PICC insertion kit were accounted for and disposed of in the sharps container prior to breakdown of the sterile field.    CLABSI prevention brochure left at bedside.    Patient  tolerated procedure well.     Patient's primary RN notified PICC is ready for use.      Findings:    Total catheter length  36 cm, with 0 cm exposed. Mid upper arm circumference is 29 cm.       Tip placement verified in the distal SVC by [employer]:        Comments:  None        Ravindra Joyner, MSN, RN, [facility]   Vascular Access - [facility]        "

## 2023-01-12 NOTE — PROGRESS NOTES
[facility] Pulmonary/Critical Care Consult Team Note    Mary Kay Tejada,  1950, MRN [id number]  Admitting Dx: SVT (supraventricular tachycardia) (H) [I47.1]  Pleural effusion [J90]  Hypoxia [R09.02]  Acute respiratory failure with hypoxia (H) [J96.01]  Bilateral lower extremity edema [R60.0]  Date / Time of Admission:  1/10/2023  9:33 PM    Overnight Events:  Intake/Output last 3 shifts:  I/O last 3 completed shifts:  In: 830 [P.O.:480; I.V.:350]  Out: 3300 [Urine:3300]  Increased FiO2 needs  Transferred to the ICU for closer monitoring    Assessment/Plan: Mary Kay Tejada is a 72 year old female with PMHx of DM, COPD, Fibromyalgia, afib, recent COVID in Nov with acute on chroinc dyspnea and fatigue since COVID found to have afib with RVR and volume overload.     PULM/ID: RML pneumonia with ongoing COVID positivity  - on ceftriaxone, azitho course  - on remdesivir course  - Emphysema on CT Chest  - continue prednisone 40mg daily  - duoneb q4 while awake    Bacteremia with gram neg blood cultures   - on Zosyn    Pleaural Effusions s/p right sided thoracentesis 1L removed  - pending pleural fluid studies    CV: ECHO with LV global hypokineses EF 50-55 and RV dilation and fluid overload  - increasing lasix to 40q6  - reducing her cardizem to 30q8 from 120 ER daily (concerns about low Bps)  - Monitor on tele    GI: NPO while on bipap  - GI proph    RENAL:   - baseline Cr of 0.62  - Follow BUN/Creatinine  - strict I/O's    ENDO: Hx of DM  - Critical Care hyperglycemic protocol  - Accuchecks and sliding scale q6    Pt has critical illness and impairs breathing on bipap such as there is high probability of imminent of life threatening deterioration in the patient's condition.    Code Status: FULL CODE    Infusions:    - MEDICATION INSTRUCTIONS -         ICU DAILY CHECKLIST           Can patient transfer out of MICU? no  FAST HUG:  Feeding:  Feeding: No  Chairez:{Yes  Analgesia/Sedation:Yes  Thromboembolic  "prophylaxis:Coumadin  HOB>30:  No  Stress Ulcer Protocol Active: Yes; Mode: PPI/H2 Antagonist  Glycemic Control: No components found for: GLU Any glucose > 180 no; Mode of Insulin Therapy: Sliding Scale Insulin  INTUBATED: NA  PHYSICAL THERAPY AND MOBILITY: Can patient have PT and mobility trial: no Activity: ICU mobility protocol    Critical Care Time excluding procedures and family discussions greater than: 1 Hour    Risk Factors Present on Admission:  Clinically Significant Risk Factors           # Hypercalcemia: corrected calcium is >10.1, will monitor as appropriate    # Hypoalbuminemia: Lowest albumin = 2.7 g/dL at 1/12/2023  1:55 AM, will monitor as appropriate           # Overweight: Estimated body mass index is 26.74 kg/m  as calculated from the following:    Height as of this encounter: 1.651 m (5' 5\").    Weight as of this encounter: 72.9 kg (160 lb 11.5 oz)., PRESENT ON ADMISSION                Karla Mojica DO  Pulmonary and Critical Care Attending  pgr [phone]    Allergies   Allergen Reactions     Celebrex [Celecoxib] Rash     patient had butterfly rash - \"lupus-like\"       Latex Rash       Meds: See MAR    Physical Exam:  /81 (BP Location: Left arm)   Pulse 82   Temp 97.3  F (36.3  C) (Oral)   Resp 18   Ht 1.651 m (5' 5\")   Wt 72.9 kg (160 lb 11.5 oz)   SpO2 93%   BMI 26.74 kg/m    Intake/Output this shift:  I/O this shift:  In: 0   Out: 600 [Urine:600]  GEN: sitting up in bed, ND  HEENT: bipap mask in place  CVS: regular rhythm, no murmurs  RESP: poor air mvmt, no secondary muscle use, no wheezing  ABD: Distended, Soft, No abdominal pain with palpation, no guarding, no rigidity  EXT: Warm, well perfused, 1+ LE edema  NEURO:  Answering questions through bipap, moving extremities    Pertinent Labs: Latest lab results in EHR personally reviewed.   CMP  Recent Labs   Lab 01/12/23  1150 01/12/23  0814 01/12/23  0435 01/12/23  0155 01/11/23  1008 01/11/23  0900 01/11/23  0139 " 01/10/23  2151   NA  --   --  142 142  --  142  --  142   POTASSIUM  --   --  3.8 3.8  --  5.1*  --  4.5   CHLORIDE  --   --  100 100  --  102  --  100   CO2  --   --  34* 34*  --  26  --  28   ANIONGAP  --   --  8 8  --  14  --  14   GLC 88 111* 118 107   < > 155*   < > 174*   BUN  --   --  21 23  --  22  --  25   CR  --   --  0.62 0.65  --  0.65  --  0.70   GFRESTIMATED  --   --  >90 >90  --  >90  --  >90   JOEY  --   --  8.8 8.7  --  9.0  --  9.7   MAG  --   --  2.6 1.7*  --   --   --  1.8   PHOS  --   --  4.3  --   --   --   --   --    PROTTOTAL  --   --  5.5* 5.4*  --  6.2  --  6.6   ALBUMIN  --   --  2.8* 2.7*  --  3.1*  --  3.1*   BILITOTAL  --   --  0.3 0.2  --  0.5  --  0.5   ALKPHOS  --   --  85 80  --  90  --  101   AST  --   --  23 23  --  45*  --  58*   ALT  --   --  48* 50*  --  65*  --  78*    < > = values in this interval not displayed.     CBC  Recent Labs   Lab 01/12/23  0435 01/11/23  1015 01/10/23  2151   WBC 10.1 5.6 8.1   RBC 3.84 3.77* 4.37   HGB 8.2* 8.1* 9.3*   HCT 30.7* 29.4* 34.0*   MCV 80 78 78   MCH 21.4* 21.5* 21.3*   MCHC 26.7* 27.6* 27.4*   RDW 21.3* 21.4* 21.8*    286 377     INR  Recent Labs   Lab 01/12/23  1152 01/10/23  2151   INR 2.98* 2.48*     Arterial Blood GasNo lab results found in last 7 days.    Cultures: personally reviewed.   7-Day Micro Results    Collected Updated Procedure Result Status    01/12/2023 1032 01/12/2023 1038 Respiratory Aerobic Bacterial Culture [[id number]]   Sputum from Expectorate    In process Component Value   No component results             01/11/2023 1148 01/11/2023 1708 Legionella pneumophila antigen urine [[id number]]   Urine, Midstream    Final result Component Value   Legionella pneumophila serogroup 1 urinary antigen Negative   Suggests no recent or current infection. Infection due to Legionella cannot be ruled out, since other serogroups and species may cause disease, antigen may not be present in urine in early infection, and the  level of antigen present in the urine may be below detectable limits of the test.          01/11/2023 1148 01/11/2023 1708 Streptococcus pneumoniae antigen [[id number]]   Urine, Midstream    Final result Component Value   Streptococcus pneumoniae antigen Negative   A negative Streptococcus pneumoniae antigen result does not rule out infection with Streptococcus pneumoniae.          01/11/2023 0837 01/11/2023 0930 Cell count with differential fluid [[id number]]    Pleural fluid from Pleural Cavity, Right    Final result Component Value   No component results          01/11/2023 0837 01/12/2023 0837 Pleural fluid Aerobic Bacterial Culture Routine with Gram Stain [[id number]]   Pleural fluid from Pleural Cavity, Right    Preliminary result Component Value   Culture No growth, less than 1 day P   Gram Stain Result No organisms seen P    1+ WBC seen P             01/11/2023 0837 01/11/2023 0919 Glucose fluid [[id number]]    Pleural fluid from Pleural Cavity, Right    Final result Component Value Units   Glucose Fluid Source Pleural Cavity, Right    Glucose fluid 152 mg/dL          01/11/2023 0837 01/11/2023 1458 Lactate dehydrogenase fluid [[id number]]    Pleural fluid from Pleural Cavity, Right    Final result Component Value Units   LD Fluid Source Pleural Cavity, Right    Lactate dehydrogenase fluid 55 U/L          01/11/2023 0837 01/11/2023 1250 Protein fluid [[id number]]    Pleural fluid from Pleural Cavity, Right    Final result Component Value Units   Protein Fluid Source Pleural Cavity, Right    Protein Total Fluid 1.6 g/dL          01/11/2023 0837 01/11/2023 0841 Anaerobic bacterial culture [[id number]]   Pleural fluid from Pleural Cavity    In process Component Value   No component results             01/11/2023 0837 01/11/2023 1242 Cytology, non-gynecologic [[id number]]   Pleural fluid from Pleural Cavity, Right    In process Component Value   No component results          01/11/2023 0837  01/11/2023 0903 pH fluid [[id number]]    Pleural fluid from Pleural Cavity, Right    Final result Component Value Units   pH Fluid Source Pleural Cavity, Right    pH Fluid 8.0 pH          01/11/2023 0837 01/11/2023 0930 Cell Count Body Fluid [[id number]]    Pleural fluid from Pleural Cavity, Right    Final result Component Value Units   Color Yellow    Clarity Clear    Total Nucleated Cells 264 /uL   RBC Count 0 /uL   Cell Count Fluid Source Pleural Cavity, Right           01/11/2023 0837 01/11/2023 0930 Differential Body Fluid [[id number]]    Pleural fluid from Pleural Cavity, Right    Final result Component Value Units   % Neutrophils 41 %   % Lymphocytes 40 %   % Monocyte/Macrophages 17 %   % Lining Cells 2 %          01/11/2023 0012 01/12/2023 0946 Blood Culture Peripheral Blood [[id number]]   Peripheral Blood    Preliminary result Component Value   Culture No growth after 1 day P             01/11/2023 0012 01/12/2023 1120 Blood Culture Peripheral Blood [[id number]]   (Abnormal)   Peripheral Blood    Preliminary result Component Value   Culture Positive on the 2nd day of incubation Abnormal  P    Gram positive cocci in clusters Panic  P    1 of 2 bottles             01/11/2023 0012 01/12/2023 1119 Verigene GP Panel [[id number]]   Peripheral Blood    In process Component Value   No component results          01/11/2023 0011 01/11/2023 0057 Asymptomatic COVID-19 Virus (Coronavirus) by PCR Nasopharyngeal [[id number]]    (Abnormal)   Swab from Nasopharyngeal    Final result Component Value   SARS CoV2 PCR Positive Abnormal    POSITIVE: SARS-CoV-2 (COVID-19) RNA detected, presumed positive.          Imaging: personally reviewed.   Results for orders placed or performed during the hospital encounter of 01/10/23   XR Chest Port 1 View    Narrative    EXAM: XR CHEST PORT 1 VIEW  LOCATION: [facility]  DATE/TIME: 1/10/2023 10:50 PM    INDICATION: dyspnea  COMPARISON: CT chest  03/26/2022      Impression    IMPRESSION: Moderate size right pleural effusion has developed with diffuse opacification of the right mid and lower lung which may represent a combination of layering pleural fluid and pneumonia or atelectasis. Small left pleural effusion and infiltrate   or atelectasis left base. Advanced degenerative osteoarthritis right shoulder.   US Thoracentesis    Narrative    EXAM:   1. RIGHT THORACENTESIS  2. ULTRASOUND GUIDANCE  LOCATION: [facility]  DATE/TIME: 1/11/2023 8:44 AM    INDICATION: Pleural effusion.    PROCEDURE: Informed consent obtained. Time out performed. The chest was prepped and draped in sterile fashion. 10 mL of 1 % lidocaine was infused into the local soft tissues. Under direct ultrasound guidance, a 5 [place] catheter system was placed into   the pleural effusion.     1 liters of yellow fluid were removed and sent to lab, if requested.    Patient tolerated procedure well.    Ultrasound imaging was obtained and placed in the patient's permanent medical record.      Impression    IMPRESSION:  Status post right ultrasound-guided thoracentesis.    Reference CPT Code: 62857   CT Chest w/o Contrast    Narrative    EXAM: CT CHEST WITHOUT CONTRAST  LOCATION: [facility]  DATE/TIME: 1/12/2023 1:09 AM    INDICATION: COVID infection. Congestive heart failure.  COMPARISON: 3/26/2022.    TECHNIQUE: CT chest without IV contrast. Multiplanar reformats were obtained. Dose reduction techniques were used.  CONTRAST: None.    FINDINGS:    LUNGS AND PLEURA: Mild to moderate emphysematous changes in the lungs. Interstitial thickening throughout both lungs. A small region of patchy opacities in the right middle lobe (series 5 image 158). Small to moderate-sized right pleural effusion with   adjacent atelectasis and small left pleural effusion with adjacent atelectasis.    MEDIASTINUM/AXILLAE: Mild cardiomegaly. A few nonspecific borderline  enlarged mediastinal lymph nodes.    CORONARY ARTERY CALCIFICATION: Present.    MUSCULOSKELETAL: No acute findings.    UPPER ABDOMEN: Unremarkable.      Impression    IMPRESSION:   1. Interstitial thickening throughout both lungs, likely relating to interstitial pulmonary edema. There are also a small to moderate-sized right pleural effusion and small left pleural effusion.   2. A small region of patchy opacities in the right middle lobe, likely representing pneumonia.  3. Mild to moderate emphysematous changes in the lungs.   -Cardioversion External    Narrative    Fabiana Guerrero MD     1/11/2023 12:31 AM  [facility]    -Cardioversion External    Date/Time: 1/10/2023 10:20 PM  Performed by: Fabiana Guerrero MD  Authorized by: Fabiana Guerrero MD     Risks, benefits and alternatives discussed.    ED EVALUATION:      Assessment Time: 1/10/2023 10:00 PM      I have performed an Emergency Department Evaluation including taking a   history and physical examination, this evaluation will be documented in   the electronic medical record for this ED encounter.     Indication: SVT    ASA Class: Class 3- Severe systemic disease, definite functional   limitations    Mallampati: Grade 2- soft palate, base of uvula, tonsillar pillars, and   portion of posterior pharyngeal wall visible    NPO Status: not NPO, emergent situation    UNIVERSAL PROTOCOL   Site Marked: NA  Prior Images Obtained and Reviewed:  NA  Required items: Required blood products, implants, devices and special   equipment available    Patient identity confirmed:  Verbally with patient and arm band  Patient was reevaluated immediately before administering moderate or deep   sedation or anesthesia  Confirmation Checklist:  Patient's identity using two indicators, relevant   allergies, procedure was appropriate and matched the consent or emergent   situation and correct equipment/implants were available  Time out: Immediately prior to the  procedure a time out was called    Universal Protocol: the Joint Commission Universal Protocol was followed    Preparation: Patient was prepped and draped in usual sterile fashion      SEDATION  Patient Sedated: Yes    Sedation Type:  Moderate (conscious) sedation  Sedation:  Etomidate  Vital signs: Vital signs monitored during sedation      PRE-PROCEDURE DETAILS:     Electrode placement:  Anterior-posterior  Attempt one:     Cardioversion mode:  Synchronous    Waveform:  Biphasic    Shock (Joules):  200    Shock outcome:  Conversion to normal sinus rhythm  Post-procedure details:     Patient status:  Awake      PROCEDURE    Patient Tolerance:  Patient tolerated the procedure well with no immediate   complications  Length of time physician/provider present for 1:1 monitoring during   sedation: 20   Echocardiogram Complete     Value    LVEF  50-55% (borderline)    Narrative    [id number]  [id number]  [id number]  [id number]^SHIRA^[place] [address]     Name: ALVAREZ HINDS  MRN: [id number]  : 1950  Study Date: 2023 08:44 AM  Age: 72 yrs  Gender: Female  Patient Location: [facility]  Reason For Study: CHF, Aortic Valve Disorder  Ordering Physician: CAYDEN SILVA  Performed By: GRICELDA     BSA: 1.8 m2  Height: 65 in  Weight: 150 lb  HR: 98  ______________________________________________________________________________  Procedure  Complete Portable Echo Adult. Definity (NDC #39633-266) given intravenously.  Technically difficult study.Extremely difficult acoustic windows despite the  use of contrast for endcardial border definition.  ______________________________________________________________________________  Interpretation Summary     Technically difficult study.Extremely difficult acoustic windows despite the  use of contrast for endcardial border definition.     1.The left ventricle is normal in size. Left ventricular function is  decreased. The ejection  fraction is 50-55% (borderline). There is borderline  global hypokinesia of the left ventricle.  2. The right ventricle is mildly dilated. Mildly decreased right ventricular  systolic function  3. Severe aortic valve calcification is present. Moderate valvular aortic  stenosis.  4. The left atrium is moderately dilated.  ______________________________________________________________________________  Left Ventricle  The left ventricle is normal in size. Left ventricular function is decreased.  The ejection fraction is 50-55% (borderline). A sigmoid septum is present.  Diastolic function not assessed due to atrial fibrillation. There is  borderline global hypokinesia of the left ventricle.     Right Ventricle  The right ventricle is mildly dilated. Mildly decreased right ventricular  systolic function.     Atria  The left atrium is moderately dilated. Right atrium not well visualized. The  right atrium is moderately dilated. There is no color Doppler evidence of an  atrial shunt.     Mitral Valve  There is mild mitral annular calcification. Mitral valve leaflets appear  normal. There is trace to mild mitral regurgitation. There is no mitral valve  stenosis.     Tricuspid Valve  The tricuspid valve is not well visualized. Right ventricular systolic  pressure could not be approximated due to inadequate tricuspid regurgitation.  No tricuspid regurgitation.     Aortic Valve  Severe aortic valve calcification is present. There is trace aortic  regurgitation. Moderate valvular aortic stenosis.     Pulmonic Valve  The pulmonic valve is not well seen, but is grossly normal. This degree of  valvular regurgitation is within normal limits. There is trace pulmonic  valvular regurgitation.     Vessels  The aorta root is normal. Normal size ascending aorta. Moderate  atherosclerotic plaque(s) in the ascending aorta. IVC diameter <2.1 cm  collapsing >50% with sniff suggests a normal RA pressure of 3 mmHg.     Pericardium  There is  no pericardial effusion.     ______________________________________________________________________________  __________________________________________________________________  Report approved by: Diamante Dykes 01/11/2023 12:13 PM       Patient Active Problem List   Diagnosis     Abnormal Weight Loss     Osteoarthritis Of The Shoulder     Chronic Constipation     Onychomycosis Of The Toenails     Diarrhea     Ganglion Of The Left Wrist     Larynx Edema     Obesity     Medial Epicondylitis     Skin Lesion     Urinary Incontinence     Chronic Major Depression     Dry Eye Syndrome     Nicotine Dependence     Hypokalemia     Essential hypertension     Muscle Cramps In The Calf     Edema     Atrial flutter (H)     Lump In / On The Skin     Chronic pain syndrome     Paroxysmal Atrial Fibrillation     Fibromyalgia     Nausea     Abdominal Pain     Peptic Ulcer     Mixed hyperlipidemia     Chronic Reflux Esophagitis     Benign Adenomatous Polyp Of The Large Intestine     Vertigo     Rotator cuff tendonitis     Generalized osteoarthrosis, involving multiple sites     Tendonitis of wrist, left     Trigger point of thoracic region     Flashers or floaters of right eye     Menopause     Tendonitis of wrist, right     Skin lesion of face     Hematoma of abdominal wall     Headache     Cervical neck pain with evidence of disc disease     Controlled substance agreement signed     Aspiration pneumonia (H)     Type 2 diabetes mellitus with hypoglycemia (H)     Candidal intertrigo     Osteoarthritis, hip, bilateral     Primary osteoarthritis of left knee     Osteoarthritis of both knees     Syncope     Opacity of lung on imaging study     Acute gastritis     Gastroenteritis     Nonrheumatic aortic valve stenosis     Bunion, left     Callus of foot     Cataract     Chronic anemia     Anemia due to blood loss, acute     Diabetic polyneuropathy associated with type 2 diabetes mellitus (H)     Upper GI bleed     Melena      Dyslipidemia     Skin lesion     Mixed stress and urge urinary incontinence     Primary osteoarthritis of both knees     Advanced directives, counseling/discussion     Chronic gastric ulcer without hemorrhage and without perforation     Atrial fibrillation, unspecified type (H)     Nicotine dependence     COPD exacerbation (H)     Acute on chronic respiratory failure with hypoxia (H)     Pneumonia of right lower lobe due to infectious organism     Dyspnea, unspecified type     Acute and chronic respiratory failure with hypercapnia (H)     Hypoxia       Karla Mojica, DO  Pulmonary and Critical Care Attending  pgr [phone]    Securely message with the Vocera Web Console (learn more here)

## 2023-01-12 NOTE — PROVIDER NOTIFICATION
WW rm [id number] pt RF- pt okay with wearing mask, but stated she gets nauseous- no prn medications available. Thank you, Yolis [phone]

## 2023-01-12 NOTE — PROGRESS NOTES
RT called into room for low sats on Bipap, RN's turned Fi02 up to 100% to get sats above 90%. RT increased Bipap settings to 14/8 100%. She appears to be resting comfortably on Bipap Sp02-93, slowly increasing

## 2023-01-12 NOTE — PROVIDER NOTIFICATION
WW rm 336 pt RF. Pt sats dipping down again- sating around 87-88%, pt willing to go on bipap again- but no other nausea medications. Thank you, Yolis [phone]

## 2023-01-12 NOTE — PROVIDER NOTIFICATION
WW rm 336 pt RF- pt has been around 10L on oxy mask and desating with any movement- turned up to 15L, called RT and recommending HFNC if we could get an order if ok? Thank you, Yolis [phone]

## 2023-01-13 LAB
ALBUMIN SERPL-MCNC: 2.7 G/DL (ref 3.5–5)
ALP SERPL-CCNC: 79 U/L (ref 45–120)
ALT SERPL W P-5'-P-CCNC: 35 U/L (ref 0–45)
ANION GAP SERPL CALCULATED.3IONS-SCNC: 9 MMOL/L (ref 5–18)
AST SERPL W P-5'-P-CCNC: 16 U/L (ref 0–40)
BILIRUB DIRECT SERPL-MCNC: 0.2 MG/DL
BILIRUB SERPL-MCNC: 0.5 MG/DL (ref 0–1)
BUN SERPL-MCNC: 19 MG/DL (ref 8–28)
CALCIUM SERPL-MCNC: 8.4 MG/DL (ref 8.5–10.5)
CHLORIDE BLD-SCNC: 95 MMOL/L (ref 98–107)
CO2 SERPL-SCNC: 38 MMOL/L (ref 22–31)
CREAT SERPL-MCNC: 0.51 MG/DL (ref 0.6–1.1)
GFR SERPL CREATININE-BSD FRML MDRD: >90 ML/MIN/1.73M2
GLUCOSE BLD-MCNC: 97 MG/DL (ref 70–125)
GLUCOSE BLDC GLUCOMTR-MCNC: 103 MG/DL (ref 70–99)
GLUCOSE BLDC GLUCOMTR-MCNC: 132 MG/DL (ref 70–99)
GLUCOSE BLDC GLUCOMTR-MCNC: 133 MG/DL (ref 70–99)
GLUCOSE BLDC GLUCOMTR-MCNC: 166 MG/DL (ref 70–99)
INR PPP: 2.91 (ref 0.85–1.15)
MAGNESIUM SERPL-MCNC: 2.1 MG/DL (ref 1.8–2.6)
PATH REPORT.COMMENTS IMP SPEC: NORMAL
PATH REPORT.COMMENTS IMP SPEC: NORMAL
PATH REPORT.FINAL DX SPEC: NORMAL
PATH REPORT.GROSS SPEC: NORMAL
PATH REPORT.MICROSCOPIC SPEC OTHER STN: NORMAL
PATH REPORT.RELEVANT HX SPEC: NORMAL
POTASSIUM BLD-SCNC: 4.1 MMOL/L (ref 3.5–5)
PROT SERPL-MCNC: 5.2 G/DL (ref 6–8)
SODIUM SERPL-SCNC: 142 MMOL/L (ref 136–145)

## 2023-01-13 PROCEDURE — 200N000001 HC R&B ICU

## 2023-01-13 PROCEDURE — 88305 TISSUE EXAM BY PATHOLOGIST: CPT | Mod: 26 | Performed by: PATHOLOGY

## 2023-01-13 PROCEDURE — 85610 PROTHROMBIN TIME: CPT | Performed by: HOSPITALIST

## 2023-01-13 PROCEDURE — 250N000011 HC RX IP 250 OP 636: Performed by: HOSPITALIST

## 2023-01-13 PROCEDURE — 99233 SBSQ HOSP IP/OBS HIGH 50: CPT | Performed by: INTERNAL MEDICINE

## 2023-01-13 PROCEDURE — 250N000011 HC RX IP 250 OP 636: Performed by: INTERNAL MEDICINE

## 2023-01-13 PROCEDURE — 250N000009 HC RX 250: Performed by: HOSPITALIST

## 2023-01-13 PROCEDURE — 250N000013 HC RX MED GY IP 250 OP 250 PS 637: Performed by: HOSPITALIST

## 2023-01-13 PROCEDURE — 88342 IMHCHEM/IMCYTCHM 1ST ANTB: CPT | Mod: 26 | Performed by: PATHOLOGY

## 2023-01-13 PROCEDURE — 94640 AIRWAY INHALATION TREATMENT: CPT

## 2023-01-13 PROCEDURE — 999N000157 HC STATISTIC RCP TIME EA 10 MIN

## 2023-01-13 PROCEDURE — 250N000012 HC RX MED GY IP 250 OP 636 PS 637: Performed by: HOSPITALIST

## 2023-01-13 PROCEDURE — 258N000003 HC RX IP 258 OP 636: Performed by: HOSPITALIST

## 2023-01-13 PROCEDURE — 99291 CRITICAL CARE FIRST HOUR: CPT | Performed by: INTERNAL MEDICINE

## 2023-01-13 PROCEDURE — 83735 ASSAY OF MAGNESIUM: CPT | Performed by: HOSPITALIST

## 2023-01-13 PROCEDURE — 88341 IMHCHEM/IMCYTCHM EA ADD ANTB: CPT | Mod: 26 | Performed by: PATHOLOGY

## 2023-01-13 PROCEDURE — 82248 BILIRUBIN DIRECT: CPT | Performed by: HOSPITALIST

## 2023-01-13 PROCEDURE — 99291 CRITICAL CARE FIRST HOUR: CPT | Performed by: HOSPITALIST

## 2023-01-13 PROCEDURE — 250N000013 HC RX MED GY IP 250 OP 250 PS 637: Performed by: INTERNAL MEDICINE

## 2023-01-13 PROCEDURE — 94640 AIRWAY INHALATION TREATMENT: CPT | Mod: 76

## 2023-01-13 PROCEDURE — C9113 INJ PANTOPRAZOLE SODIUM, VIA: HCPCS | Performed by: HOSPITALIST

## 2023-01-13 PROCEDURE — 999N000287 HC ICU ADULT ROUNDING, EACH 10 MINS

## 2023-01-13 RX ORDER — FUROSEMIDE 10 MG/ML
40 INJECTION INTRAMUSCULAR; INTRAVENOUS EVERY 12 HOURS
Status: DISCONTINUED | OUTPATIENT
Start: 2023-01-13 | End: 2023-01-14

## 2023-01-13 RX ORDER — WARFARIN SODIUM 2 MG/1
2 TABLET ORAL
Status: COMPLETED | OUTPATIENT
Start: 2023-01-13 | End: 2023-01-13

## 2023-01-13 RX ADMIN — FLUTICASONE FUROATE AND VILANTEROL TRIFENATATE 1 PUFF: 200; 25 POWDER RESPIRATORY (INHALATION) at 09:19

## 2023-01-13 RX ADMIN — GABAPENTIN 600 MG: 600 TABLET, FILM COATED ORAL at 20:00

## 2023-01-13 RX ADMIN — DILTIAZEM HYDROCHLORIDE 30 MG: 30 TABLET, FILM COATED ORAL at 05:21

## 2023-01-13 RX ADMIN — PREDNISONE 40 MG: 20 TABLET ORAL at 05:21

## 2023-01-13 RX ADMIN — AZITHROMYCIN MONOHYDRATE 500 MG: 500 TABLET ORAL at 22:29

## 2023-01-13 RX ADMIN — PANTOPRAZOLE SODIUM 40 MG: 40 INJECTION, POWDER, FOR SOLUTION INTRAVENOUS at 09:20

## 2023-01-13 RX ADMIN — ATORVASTATIN CALCIUM 20 MG: 10 TABLET, FILM COATED ORAL at 22:29

## 2023-01-13 RX ADMIN — SUCRALFATE 1 G: 1 TABLET ORAL at 20:00

## 2023-01-13 RX ADMIN — FUROSEMIDE 40 MG: 10 INJECTION, SOLUTION INTRAVENOUS at 04:46

## 2023-01-13 RX ADMIN — WARFARIN SODIUM 2 MG: 2 TABLET ORAL at 18:17

## 2023-01-13 RX ADMIN — CEFTRIAXONE SODIUM 2 G: 2 INJECTION, POWDER, FOR SOLUTION INTRAMUSCULAR; INTRAVENOUS at 22:29

## 2023-01-13 RX ADMIN — IPRATROPIUM BROMIDE AND ALBUTEROL SULFATE 3 ML: 2.5; .5 SOLUTION RESPIRATORY (INHALATION) at 23:05

## 2023-01-13 RX ADMIN — FUROSEMIDE 40 MG: 10 INJECTION, SOLUTION INTRAVENOUS at 10:46

## 2023-01-13 RX ADMIN — IPRATROPIUM BROMIDE AND ALBUTEROL SULFATE 3 ML: 2.5; .5 SOLUTION RESPIRATORY (INHALATION) at 07:18

## 2023-01-13 RX ADMIN — SODIUM CHLORIDE 50 ML: 9 INJECTION, SOLUTION INTRAVENOUS at 16:56

## 2023-01-13 RX ADMIN — IPRATROPIUM BROMIDE AND ALBUTEROL SULFATE 3 ML: 2.5; .5 SOLUTION RESPIRATORY (INHALATION) at 15:23

## 2023-01-13 RX ADMIN — IPRATROPIUM BROMIDE AND ALBUTEROL SULFATE 3 ML: 2.5; .5 SOLUTION RESPIRATORY (INHALATION) at 11:11

## 2023-01-13 RX ADMIN — FUROSEMIDE 40 MG: 10 INJECTION, SOLUTION INTRAVENOUS at 22:30

## 2023-01-13 RX ADMIN — DILTIAZEM HYDROCHLORIDE 30 MG: 30 TABLET, FILM COATED ORAL at 13:38

## 2023-01-13 RX ADMIN — SUCRALFATE 1 G: 1 TABLET ORAL at 13:39

## 2023-01-13 RX ADMIN — GABAPENTIN 600 MG: 600 TABLET, FILM COATED ORAL at 13:39

## 2023-01-13 RX ADMIN — IPRATROPIUM BROMIDE AND ALBUTEROL SULFATE 3 ML: 2.5; .5 SOLUTION RESPIRATORY (INHALATION) at 19:31

## 2023-01-13 RX ADMIN — GABAPENTIN 600 MG: 600 TABLET, FILM COATED ORAL at 09:20

## 2023-01-13 RX ADMIN — DILTIAZEM HYDROCHLORIDE 30 MG: 30 TABLET, FILM COATED ORAL at 22:30

## 2023-01-13 RX ADMIN — SUCRALFATE 1 G: 1 TABLET ORAL at 09:20

## 2023-01-13 RX ADMIN — REMDESIVIR 100 MG: 100 INJECTION, POWDER, LYOPHILIZED, FOR SOLUTION INTRAVENOUS at 16:55

## 2023-01-13 ASSESSMENT — ACTIVITIES OF DAILY LIVING (ADL)
ADLS_ACUITY_SCORE: 45
ADLS_ACUITY_SCORE: 43
ADLS_ACUITY_SCORE: 45
ADLS_ACUITY_SCORE: 43

## 2023-01-13 NOTE — PROGRESS NOTES
Pt remain on HFNC with settings below;         01/13/23 0412   Tech Time   $Tech Time (10 minute increments) 2   Oxygen Therapy   SpO2 94 %   O2 Device High Flow Nasal Cannula (HFNC)   FiO2 (%) (S)  70 %   Oxygen Delivery 45 LPM     BiPAP remain on standby. Pt refused to wear for the night.    An Buitrago, RT

## 2023-01-13 NOTE — PROGRESS NOTES
[facility]    Medicine Progress Note - Hospitalist Service    Date of Admission:  1/10/2023    Assessment & Plan   72 year old female with a past medical history of COPD, HTN, HLD, atrial fibrillation on warfarin, moderate aortic stenosis, history of HFpEF who presented with shortness of breath. In the ER had SVT and treated with adenosine and then cardioversion.    Admitted on 1/10 with acute hypoxic respiratory failure requiring BiPAP, initially with unclear inciting event.  Noted to have a moderate right pleural effusion and underwent therapeutic thoracentesis on 1/11 with 1 L transitive fluid removed.  Initially started on ceftriaxone and azithromycin for possible community-acquired pneumonia.  TTE was done which noted EF 55%, borderline hypokinesis of LV, aortic valve calcifications with moderate aortic stenosis.  It was then felt the patient was fluid overloaded given her symptoms of lower extremity edema and was started on IV diuresis on 1/11.    Patient also noted to be COVID-19 positive on admission; but did report having a COVID-19 infection approximately 2 months prior.  Was started on remdesivir although felt less likely that COVID-19 is contributing to her respiratory status.    Patient had a CT chest done on 1/11 which noted bilateral pleural effusions and interstitial thickening consistent with CHF, and small patchy opacities in right middle lobe possibly representing pneumonia. On 1/12 had increasing O2 requirements, up to 100% FiO2 BiPAP.  Subsequent transfer to ICU for closer monitoring.  O2 requirements improving on 1/13 down to 70% HFNC.    - Continue to wean O2 as tolerated, down to 70% HFNC this AM.  Continue with IV diuresis for CHF.    Acute hypoxic respiratory failure  Acute on chronic HFpEF with moderate right pleural effusion s/p Thoracentesis  Bilateral Pleural Effusions  Moderate AS  -On 70% FiO2,   -ICU consulted, appreciate recs  -Status post thoracentesis on  1/11, transudative fluid per lights criteria   -Fluid cultures negative so far  -Lasix IV 40 mg q6H (home dose 20 mg Daily) as still volume overloaded, diuresing well    Less Likely Bacterial PNA  COVID-19 Positive (less likely active infection)  -ID consulted, appreciate recs  -Thoracentesis fluid, going against exudative process such as bacterial pneumonia  -Ceftriaxone + Azithromycin (started 1/10)   - Feel less likely bacterial pneumonia although given high O2 needs will continue at this time, plan for 5 day course  -Remdesivir (start date 1/11) for treatment/prophylaxis given positive COVID-19 status   - Was treated for COVID-19 in Nov 2022 with anti-viral per admission HPI  -BiPAP PRN    Positive Blood Culture  1 of 4 positive for Staph homnis at 2 days, likely contaminant given no fevers.  - No tx necessary at this time    COPD  With possible mild exacerbation  -Prednisone 40 mg Daily x5 days  -DuoNebs  -Substitute home Symbicort for Breo Ellpita     SVT   In the ER. S/p adenosine 12 mg and then cardioversion.  -Telemetry  -Continue home Diltiazem 30 q8H  -Monitor electrolytes    Diabetes Mellitus Type 2 without known complications  Home Regimen: Metformin 500 mg BID Last A1c: 5.8% (7/2022)  - Hold oral medication  - Moderate Dose ISS  - Target glucose 140-180    Chronic Problems  Chronic Afib and on coumadin   - Pharm to manage coumadin, appreciate recs  - INR goal 2-3      HLD  - PTA meds      Chronic pain issues   - Continue home Gabapentin  - Holding home opioids       Diet: Fluid restriction 1800 ML FLUID  Clear Liquid Diet    DVT Prophylaxis: Warfarin  Chairez Catheter: PRESENT, indication: Strict 1-2 Hour I&O  Lines: PRESENT      PICC 01/12/23 Triple Lumen Right Basilic Vascular Access; Medication Drips-Site Assessment: WDL      Cardiac Monitoring: None  Code Status: Full Code      Clinically Significant Risk Factors              # Hypoalbuminemia: Lowest albumin = 2.7 g/dL at 1/13/2023  4:52 AM, will  "monitor as appropriate            # Overweight: Estimated body mass index is 25.35 kg/m  as calculated from the following:    Height as of this encounter: 1.651 m (5' 5\").    Weight as of this encounter: 69.1 kg (152 lb 5.4 oz)., PRESENT ON ADMISSION         Disposition Plan      Expected Discharge Date: 01/16/2023      Destination: home with family;home with help/services  Discharge Comments: Requiring HFNC; plan to wean O2 and diurese prior to d/c          Louis Dutta MD  Hospitalist Service  [facility]  Securely message with [employer] (more info)  Text page via [facility] Paging/Directory   ______________________________________________________________________    Interval History   Patient seen and evaluated at bedside.  Patient's family were also present bedside during my evaluation.    She seems to be doing well, has no acute complaints at this time.  Does not really feel short of breath.  No chest pain or abdominal pain either.  Feels more comfortable on the BiPAP.  She was asking about food, explained that given her high O2 needs we need to be cautious to ensure that she does not aspirate or require BiPAP thereafter.    Physical Exam   Vital Signs: Temp: 99.2  F (37.3  C) Temp src: Oral BP: 112/55 Pulse: 90   Resp: 25 SpO2: 90 % O2 Device: High Flow Nasal Cannula (HFNC) Oxygen Delivery: 45 LPM  Weight: 152 lbs 5.41 oz    General: Sitting at the edge of bed, appears comfortable  HEENT: NC/AT, anicteric sclera, external ears normal  Neck: supple  CV: unable to auscultate due to PAPR use, no peripheral edema  Resp: unable to auscultate due to PAPR use, no respiratory distress or sensory muscle use   Abdomen: soft, nontender, no guarding, mild distention  Skin: warm, dry  Neuro: alert, cranial nerves grossly intact  Psych: normal insight and judgement, appropriate speech    Medical Decision Making       **CLEAR ALL SELECTIONS**       IS PATIENT CRITICALLY ILL ?  Is there a high potential of " sudden, clinically significant, or life threatening deterioration? Yes  Is there a need for direct personal assessment and management to treat/prevent multiple vital organ  failure/deterioration? Yes  If this patient is not critically ill, the reason for continued hospitalization is hypoxia.    PATIENT IS CRITICALLY ILL WITH THESE DIAGNOSES BEING MANAGED BY HOSPITAL  MEDICINE:  Acute Hypoxic Respiratory Failure    I personally performed 35 minutes of aggregate critical care time exclusive of procedures and  teaching. This includes time spent during direct patient evaluation and reassessment, interpreting  diagnostic tests, directing life and/or organ supporting interventions and documentation on the unit.    Data     I have personally reviewed the following data over the past 24 hrs:    N/A  \   N/A   / N/A     142 95 (L) 19 /  166 (H)   4.1 38 (H) 0.51 (L) \       ALT: 35 AST: 16 AP: 79 TBILI: 0.5   ALB: 2.7 (L) TOT PROTEIN: 5.2 (L) LIPASE: N/A       Procal: N/A CRP: N/A Lactic Acid: N/A       INR:  2.91 (H) PTT:  N/A   D-dimer:  N/A Fibrinogen:  N/A       Imaging results reviewed over the past 24 hrs:   No results found for this or any previous visit (from the past 24 hour(s)).

## 2023-01-13 NOTE — PROGRESS NOTES
Pt transferred to ICU for increased O2 needs. Currently stable on HFNC 40L 60-70%. Frequent runs of SVT this evening, asx, VSS. MD notified and K/Mg rechecked. Will be replaced and recheck in AM. Remains A+O, able to make needs known. PICC and paiz placed at bedside. Son at bedside most of shift. Family needed to be told/reminded 2x of visitor policy for COVID patients.     Lisa Haas RN

## 2023-01-13 NOTE — PROGRESS NOTES
[facility]  Infectious Disease   Progress Note     Date of Admission:  1/10/2023    Assessment & Plan   CHF,SVT, aortic stenosis, right pleural effusion, Transudative  Possible right sided pneumonia, versus compressive atelectasis (A small region of patchy opacities in the right middle lobe )  COVID PCR positive.  She was positive in November.  Possible reinfection versus persistent shedding from November- at any rate no typical COVID features on CT chest  Troponin normal  Active smoker  Frail elderly  BC >>S hominis- contaminant not treating  CRP 0.5!    PLAN  IV remdesivir, ceftriaxone azithromycin-started 1/11  Already on steroids for COPD  Check procalcitonin>>0.04  CT chest>>> Mostly edema  Track inflammatory markers    Mary Garcia M.D.    ______________________________________________________________________    Interval History   feels better  Sitting in bed      Radiology personally reviewed  Ct  1. Interstitial thickening throughout both lungs, likely relating to interstitial pulmonary edema. There are also a small to moderate-sized right pleural effusion and small left pleural effusion.   2. A small region of patchy opacities in the right middle lobe, likely representing pneumonia.  3. Mild to moderate emphysematous changes in the lungs.    Physical Exam   Vital Signs: Temp: 99.2  F (37.3  C) Temp src: Oral BP: 98/71 Pulse: 92   Resp: 30 SpO2: 93 % O2 Device: High Flow Nasal Cannula (HFNC) Oxygen Delivery: 45 LPM  Weight: 152 lbs 5.41 oz   Limited, see intensivist  Gen. appearance on oxygen  Eyes no conjunctivitis or icterus  Extremities no synovitis, trace edema  Skin  no rash  Neurologic alert oriented no focal deficits      Data   Results for orders placed or performed during the hospital encounter of 01/10/23 (from the past 24 hour(s))   Glucose by meter   Result Value Ref Range    GLUCOSE BY METER POCT 84 70 - 99 mg/dL   Glucose by meter   Result Value Ref Range     GLUCOSE BY METER POCT 111 (H) 70 - 99 mg/dL   MRSA MSSA PCR, Nasal Swab    Specimen: Nares, Bilateral; Swab   Result Value Ref Range    MRSA Target DNA Negative Negative    SA Target DNA Negative     Narrative    [employer]  Xpert SA Nasal Complete assay performed in the Knock Knock System is a qualitative in vitro diagnostic test designed for rapid detection of Staphylococcus aureus (SA) and methicillin-resistant Staphylococcus aureus (MRSA) from nasal swabs in patients at risk for nasal colonization. The test utilizes automated real-time polymerase chain reaction (PCR) to detect MRSA/SA DNA. The Xpert SA Nasal Complete assay is intended to aid in the prevention and control of MRSA/SA infections in healthcare settings. The assay is not intended to diagnose, guide or monitor treatment for MRSA/SA infections, or provide results of susceptibility to methicillin. A negative result does not preclude MRSA/SA nasal colonization.    Potassium   Result Value Ref Range    Potassium 3.7 3.5 - 5.0 mmol/L   Magnesium   Result Value Ref Range    Magnesium 1.8 1.8 - 2.6 mg/dL   Glucose by meter   Result Value Ref Range    GLUCOSE BY METER POCT 155 (H) 70 - 99 mg/dL   Basic metabolic panel   Result Value Ref Range    Sodium 142 136 - 145 mmol/L    Potassium 4.1 3.5 - 5.0 mmol/L    Chloride 95 (L) 98 - 107 mmol/L    Carbon Dioxide (CO2) 38 (H) 22 - 31 mmol/L    Anion Gap 9 5 - 18 mmol/L    Urea Nitrogen 19 8 - 28 mg/dL    Creatinine 0.51 (L) 0.60 - 1.10 mg/dL    Calcium 8.4 (L) 8.5 - 10.5 mg/dL    Glucose 97 70 - 125 mg/dL    GFR Estimate >90 >60 mL/min/1.73m2   Hepatic function panel   Result Value Ref Range    Bilirubin Total 0.5 0.0 - 1.0 mg/dL    Bilirubin Direct 0.2 <=0.5 mg/dL    Protein Total 5.2 (L) 6.0 - 8.0 g/dL    Albumin 2.7 (L) 3.5 - 5.0 g/dL    Alkaline Phosphatase 79 45 - 120 U/L    AST 16 0 - 40 U/L    ALT 35 0 - 45 U/L   Magnesium   Result Value Ref Range    Magnesium 2.1 1.8 - 2.6 mg/dL   INR   Result Value Ref  Range    INR 2.91 (H) 0.85 - 1.15   Glucose by meter   Result Value Ref Range    GLUCOSE BY METER POCT 103 (H) 70 - 99 mg/dL   Glucose by meter   Result Value Ref Range    GLUCOSE BY METER POCT 166 (H) 70 - 99 mg/dL

## 2023-01-13 NOTE — PLAN OF CARE
[id number]: A/ox4. VSS on HFNC on shift. Attempted to use Bipap at bedtime- pt didn't tolerate mask. Reported feeling slightly claustrophobic. Sats maintained >92% on HFNC 80%/45L. Denied pain. Tele: SR/SD with occasional PACs. Sometimes goes in and out of a-fib. Chairez in place- adequate UOP. Gave scheduled lasix. No BM. BS q6hrs.Mag/potassium WDL this AM- recheck tomorrow. No acute events on shift.       Problem: Plan of Care - These are the overarching goals to be used throughout the patient stay.    Goal: Optimal Comfort and Wellbeing  Outcome: Progressing     Problem: Risk for Delirium  Goal: Improved Sleep  Outcome: Progressing     Problem: Gas Exchange Impaired  Goal: Optimal Gas Exchange  Outcome: Progressing

## 2023-01-13 NOTE — PROGRESS NOTES
Pt remains on HFNC 45L/70% FiO2. O2 sats low 90s. BS clear/diminished pre and post neb. Pt received Duoneb q4h while awake. BiPAP on standby. RT will continue to follow.    Luis Manuel Roa, RT

## 2023-01-13 NOTE — PROGRESS NOTES
[facility] Pulmonary/Critical Care Consult Team Note    Mary Kay Tejada,  1950, MRN [id number]  Admitting Dx: SVT (supraventricular tachycardia) (H) [I47.1]  Pleural effusion [J90]  Hypoxia [R09.02]  Acute respiratory failure with hypoxia (H) [J96.01]  Bilateral lower extremity edema [R60.0]  Date / Time of Admission:  1/10/2023  9:33 PM    Overnight Events:  Intake/Output last 3 shifts:  I/O last 3 completed shifts:  In: 530 [P.O.:30; I.V.:500]  Out: 4903 [Urine:4903]  She had robust diuresis overnight -4.3L  She thinks her LE swelling is much decreased  It [birth year] birthday and she would like to eat  Her FiO2 has been titrated down to 70%  She is in good spirits this morning    Assessment/Plan: Mary Kay Tejada is a 72 year old female with PMHx of DM, COPD, Fibromyalgia, afib, recent COVID in Nov with acute on chroinc dyspnea and fatigue since COVID found to have afib with RVR and volume overload.     PULM/ID: RML pneumonia with ongoing COVID positivity  - on ceftriaxone, azitho course  - on remdesivir course  - Emphysema on CT Chest  - continue prednisone 40mg daily  - duoneb q4 while awake  - Blood culture with Staph hominis, all subsequent cultures negative  - was on Zosyn, changed to Azithro and Ceftriaxone     Pleaural Effusions s/p right sided thoracentesis 1L removed  - pending pleural fluid studies    CV: ECHO with LV global hypokineses EF 50-55 and RV dilation and fluid overload  - lasix 40q6, will decrease to 40q12  - reducing her cardizem to 30q8 from 120 ER daily (concerns about low Bps )  - Monitor on tele    GI: NPO while on bipap  - will advance to clear liquid this am  - GI proph    RENAL:   - baseline Cr of 0.62  - Follow BUN/Creatinine  - strict I/O's    ENDO: Hx of DM  - Critical Care hyperglycemic protocol  - Accuchecks and sliding scale q6    Pt has critical illness and impairs breathing on bipap such as there is high probability of imminent of life threatening deterioration  "in the patient's condition.    Code Status: FULL CODE    Infusions:    - MEDICATION INSTRUCTIONS -         ICU DAILY CHECKLIST           Can patient transfer out of MICU? no  FAST HUG:  Feeding:  Feeding: No  Chairez:{Yes  Analgesia/Sedation:Yes  Thromboembolic prophylaxis:Coumadin  HOB>30:  No  Stress Ulcer Protocol Active: Yes; Mode: PPI/H2 Antagonist  Glycemic Control: No components found for: GLU Any glucose > 180 no; Mode of Insulin Therapy: Sliding Scale Insulin  INTUBATED: NA  PHYSICAL THERAPY AND MOBILITY: Can patient have PT and mobility trial: no Activity: ICU mobility protocol    Critical Care Time excluding procedures and family discussions greater than: 1 Hour    Risk Factors Present on Admission:  Clinically Significant Risk Factors              # Hypoalbuminemia: Lowest albumin = 2.7 g/dL at 1/13/2023  4:52 AM, will monitor as appropriate           # Overweight: Estimated body mass index is 25.35 kg/m  as calculated from the following:    Height as of this encounter: 1.651 m (5' 5\").    Weight as of this encounter: 69.1 kg (152 lb 5.4 oz)., PRESENT ON ADMISSION                Karla Mojica DO  Pulmonary and Critical Care Attending  pgr [phone]    Allergies   Allergen Reactions     Celebrex [Celecoxib] Rash     patient had butterfly rash - \"lupus-like\"       Latex Rash       Meds: See MAR    Physical Exam:  /56   Pulse 83   Temp 98.6  F (37  C) (Oral)   Resp 16   Ht 1.651 m (5' 5\")   Wt 69.1 kg (152 lb 5.4 oz)   SpO2 92%   BMI 25.35 kg/m    Intake/Output this shift:  I/O this shift:  In: -   Out: 400 [Urine:400]  GEN: sitting up in bed, ND  HEENT: HFNC in place  CVS: regular rhythm, no murmurs  RESP: poor air mvmt, no secondary muscle use, no wheezing  ABD: Distended, Soft, No abdominal pain with palpation, no guarding, no rigidity  EXT: Warm, well perfused, 1+ LE edema  NEURO: awake and alert, moving all extremities, pleasant    Pertinent Labs: Latest lab results in EHR personally " reviewed.   CMP  Recent Labs   Lab 01/13/23  0520 01/13/23  0452 01/12/23  2312 01/12/23  1743 01/12/23  1740 01/12/23  0814 01/12/23  0435 01/12/23  0155 01/11/23  1008 01/11/23  0900   NA  --  142  --   --   --   --  142 142  --  142   POTASSIUM  --  4.1  --  3.7  --   --  3.8 3.8  --  5.1*   CHLORIDE  --  95*  --   --   --   --  100 100  --  102   CO2  --  38*  --   --   --   --  34* 34*  --  26   ANIONGAP  --  9  --   --   --   --  8 8  --  14   * 97 155*  --  111*   < > 118 107   < > 155*   BUN  --  19  --   --   --   --  21 23  --  22   CR  --  0.51*  --   --   --   --  0.62 0.65  --  0.65   GFRESTIMATED  --  >90  --   --   --   --  >90 >90  --  >90   JOEY  --  8.4*  --   --   --   --  8.8 8.7  --  9.0   MAG  --  2.1  --  1.8  --   --  2.6 1.7*  --   --    PHOS  --   --   --   --   --   --  4.3  --   --   --    PROTTOTAL  --  5.2*  --   --   --   --  5.5* 5.4*  --  6.2   ALBUMIN  --  2.7*  --   --   --   --  2.8* 2.7*  --  3.1*   BILITOTAL  --  0.5  --   --   --   --  0.3 0.2  --  0.5   ALKPHOS  --  79  --   --   --   --  85 80  --  90   AST  --  16  --   --   --   --  23 23  --  45*   ALT  --  35  --   --   --   --  48* 50*  --  65*    < > = values in this interval not displayed.     CBC  Recent Labs   Lab 01/12/23 0435 01/11/23  1015 01/10/23  2151   WBC 10.1 5.6 8.1   RBC 3.84 3.77* 4.37   HGB 8.2* 8.1* 9.3*   HCT 30.7* 29.4* 34.0*   MCV 80 78 78   MCH 21.4* 21.5* 21.3*   MCHC 26.7* 27.6* 27.4*   RDW 21.3* 21.4* 21.8*    286 377     INR  Recent Labs   Lab 01/13/23  0452 01/12/23  1152 01/10/23  2151   INR 2.91* 2.98* 2.48*     Arterial Blood GasNo lab results found in last 7 days.    Cultures: personally reviewed.   7-Day Micro Results    Collected Updated Procedure Result Status    01/12/2023 1032 01/12/2023 1038 Respiratory Aerobic Bacterial Culture [[id number]]   Sputum from Expectorate    In process Component Value   No component results             01/11/2023 1148 01/11/2023 1708  Legionella pneumophila antigen urine [[id number]]   Urine, Midstream    Final result Component Value   Legionella pneumophila serogroup 1 urinary antigen Negative   Suggests no recent or current infection. Infection due to Legionella cannot be ruled out, since other serogroups and species may cause disease, antigen may not be present in urine in early infection, and the level of antigen present in the urine may be below detectable limits of the test.          01/11/2023 1148 01/11/2023 1708 Streptococcus pneumoniae antigen [[id number]]   Urine, Midstream    Final result Component Value   Streptococcus pneumoniae antigen Negative   A negative Streptococcus pneumoniae antigen result does not rule out infection with Streptococcus pneumoniae.          01/11/2023 0837 01/11/2023 0930 Cell count with differential fluid [[id number]]    Pleural fluid from Pleural Cavity, Right    Final result Component Value   No component results          01/11/2023 0837 01/12/2023 0837 Pleural fluid Aerobic Bacterial Culture Routine with Gram Stain [[id number]]   Pleural fluid from Pleural Cavity, Right    Preliminary result Component Value   Culture No growth, less than 1 day P   Gram Stain Result No organisms seen P    1+ WBC seen P             01/11/2023 0837 01/11/2023 0919 Glucose fluid [[id number]]    Pleural fluid from Pleural Cavity, Right    Final result Component Value Units   Glucose Fluid Source Pleural Cavity, Right    Glucose fluid 152 mg/dL          01/11/2023 0837 01/11/2023 1458 Lactate dehydrogenase fluid [[id number]]    Pleural fluid from Pleural Cavity, Right    Final result Component Value Units   LD Fluid Source Pleural Cavity, Right    Lactate dehydrogenase fluid 55 U/L          01/11/2023 0837 01/11/2023 1250 Protein fluid [[id number]]    Pleural fluid from Pleural Cavity, Right    Final result Component Value Units   Protein Fluid Source Pleural Cavity, Right    Protein Total Fluid 1.6 g/dL           01/11/2023 0837 01/11/2023 0841 Anaerobic bacterial culture [[id number]]   Pleural fluid from Pleural Cavity    In process Component Value   No component results             01/11/2023 0837 01/11/2023 1242 Cytology, non-gynecologic [[id number]]   Pleural fluid from Pleural Cavity, Right    In process Component Value   No component results          01/11/2023 0837 01/11/2023 0903 pH fluid [[id number]]    Pleural fluid from Pleural Cavity, Right    Final result Component Value Units   pH Fluid Source Pleural Cavity, Right    pH Fluid 8.0 pH          01/11/2023 0837 01/11/2023 0930 Cell Count Body Fluid [[id number]]    Pleural fluid from Pleural Cavity, Right    Final result Component Value Units   Color Yellow    Clarity Clear    Total Nucleated Cells 264 /uL   RBC Count 0 /uL   Cell Count Fluid Source Pleural Cavity, Right           01/11/2023 0837 01/11/2023 0930 Differential Body Fluid [[id number]]    Pleural fluid from Pleural Cavity, Right    Final result Component Value Units   % Neutrophils 41 %   % Lymphocytes 40 %   % Monocyte/Macrophages 17 %   % Lining Cells 2 %          01/11/2023 0012 01/12/2023 0946 Blood Culture Peripheral Blood [[id number]]   Peripheral Blood    Preliminary result Component Value   Culture No growth after 1 day P             01/11/2023 0012 01/12/2023 1120 Blood Culture Peripheral Blood [[id number]]   (Abnormal)   Peripheral Blood    Preliminary result Component Value   Culture Positive on the 2nd day of incubation Abnormal  P    Gram positive cocci in clusters Panic  P    1 of 2 bottles             01/11/2023 0012 01/12/2023 1119 Verigene GP Panel [[id number]]   Peripheral Blood    In process Component Value   No component results          01/11/2023 0011 01/11/2023 0057 Asymptomatic COVID-19 Virus (Coronavirus) by PCR Nasopharyngeal [[id number]]    (Abnormal)   Swab from Nasopharyngeal    Final result Component Value   SARS CoV2 PCR Positive Abnormal    POSITIVE:  SARS-CoV-2 (COVID-19) RNA detected, presumed positive.          Imaging: personally reviewed.   Results for orders placed or performed during the hospital encounter of 01/10/23   XR Chest Port 1 View    Narrative    EXAM: XR CHEST PORT 1 VIEW  LOCATION: [facility]  DATE/TIME: 1/10/2023 10:50 PM    INDICATION: dyspnea  COMPARISON: CT chest 03/26/2022      Impression    IMPRESSION: Moderate size right pleural effusion has developed with diffuse opacification of the right mid and lower lung which may represent a combination of layering pleural fluid and pneumonia or atelectasis. Small left pleural effusion and infiltrate   or atelectasis left base. Advanced degenerative osteoarthritis right shoulder.   US Thoracentesis    Narrative    EXAM:   1. RIGHT THORACENTESIS  2. ULTRASOUND GUIDANCE  LOCATION: [facility]  DATE/TIME: 1/11/2023 8:44 AM    INDICATION: Pleural effusion.    PROCEDURE: Informed consent obtained. Time out performed. The chest was prepped and draped in sterile fashion. 10 mL of 1 % lidocaine was infused into the local soft tissues. Under direct ultrasound guidance, a 5 English catheter system was placed into   the pleural effusion.     1 liters of yellow fluid were removed and sent to lab, if requested.    Patient tolerated procedure well.    Ultrasound imaging was obtained and placed in the patient's permanent medical record.      Impression    IMPRESSION:  Status post right ultrasound-guided thoracentesis.    Reference CPT Code: 04265   CT Chest w/o Contrast    Narrative    EXAM: CT CHEST WITHOUT CONTRAST  LOCATION: [facility]  DATE/TIME: 1/12/2023 1:09 AM    INDICATION: COVID infection. Congestive heart failure.  COMPARISON: 3/26/2022.    TECHNIQUE: CT chest without IV contrast. Multiplanar reformats were obtained. Dose reduction techniques were used.  CONTRAST: None.    FINDINGS:    LUNGS AND PLEURA: Mild to moderate  emphysematous changes in the lungs. Interstitial thickening throughout both lungs. A small region of patchy opacities in the right middle lobe (series 5 image 158). Small to moderate-sized right pleural effusion with   adjacent atelectasis and small left pleural effusion with adjacent atelectasis.    MEDIASTINUM/AXILLAE: Mild cardiomegaly. A few nonspecific borderline enlarged mediastinal lymph nodes.    CORONARY ARTERY CALCIFICATION: Present.    MUSCULOSKELETAL: No acute findings.    UPPER ABDOMEN: Unremarkable.      Impression    IMPRESSION:   1. Interstitial thickening throughout both lungs, likely relating to interstitial pulmonary edema. There are also a small to moderate-sized right pleural effusion and small left pleural effusion.   2. A small region of patchy opacities in the right middle lobe, likely representing pneumonia.  3. Mild to moderate emphysematous changes in the lungs.   -Cardioversion External    Narrative    Fabiana Guerrero MD     1/11/2023 12:31 AM  [facility]    -Cardioversion External    Date/Time: 1/10/2023 10:20 PM  Performed by: Fabiana Guerrero MD  Authorized by: Fabiana Guerrero MD     Risks, benefits and alternatives discussed.    ED EVALUATION:      Assessment Time: 1/10/2023 10:00 PM      I have performed an Emergency Department Evaluation including taking a   history and physical examination, this evaluation will be documented in   the electronic medical record for this ED encounter.     Indication: SVT    ASA Class: Class 3- Severe systemic disease, definite functional   limitations    Mallampati: Grade 2- soft palate, base of uvula, tonsillar pillars, and   portion of posterior pharyngeal wall visible    NPO Status: not NPO, emergent situation    UNIVERSAL PROTOCOL   Site Marked: NA  Prior Images Obtained and Reviewed:  NA  Required items: Required blood products, implants, devices and special   equipment available    Patient identity confirmed:  Verbally  with patient and arm band  Patient was reevaluated immediately before administering moderate or deep   sedation or anesthesia  Confirmation Checklist:  Patient's identity using two indicators, relevant   allergies, procedure was appropriate and matched the consent or emergent   situation and correct equipment/implants were available  Time out: Immediately prior to the procedure a time out was called    Universal Protocol: the Joint Commission Universal Protocol was followed    Preparation: Patient was prepped and draped in usual sterile fashion      SEDATION  Patient Sedated: Yes    Sedation Type:  Moderate (conscious) sedation  Sedation:  Etomidate  Vital signs: Vital signs monitored during sedation      PRE-PROCEDURE DETAILS:     Electrode placement:  Anterior-posterior  Attempt one:     Cardioversion mode:  Synchronous    Waveform:  Biphasic    Shock (Joules):  200    Shock outcome:  Conversion to normal sinus rhythm  Post-procedure details:     Patient status:  Awake      PROCEDURE    Patient Tolerance:  Patient tolerated the procedure well with no immediate   complications  Length of time physician/provider present for 1:1 monitoring during   sedation: 20   Echocardiogram Complete     Value    LVEF  50-55% (borderline)    Narrative    [id number]  [id number]  [id number]  [id number]^SHIRA^[place] [address]     Name: ALVAREZ HINDS  MRN: [id number]  : 1950  Study Date: 2023 08:44 AM  Age: 72 yrs  Gender: Female  Patient Location: [facility]  Reason For Study: CHF, Aortic Valve Disorder  Ordering Physician: CAYDEN SILVA  Performed By: GRICELDA     BSA: 1.8 m2  Height: 65 in  Weight: 150 lb  HR: 98  ______________________________________________________________________________  Procedure  Complete Portable Echo Adult. Definity (NDC #71710-907) given intravenously.  Technically difficult study.Extremely difficult acoustic windows despite the  use of  contrast for endcardial border definition.  ______________________________________________________________________________  Interpretation Summary     Technically difficult study.Extremely difficult acoustic windows despite the  use of contrast for endcardial border definition.     1.The left ventricle is normal in size. Left ventricular function is  decreased. The ejection fraction is 50-55% (borderline). There is borderline  global hypokinesia of the left ventricle.  2. The right ventricle is mildly dilated. Mildly decreased right ventricular  systolic function  3. Severe aortic valve calcification is present. Moderate valvular aortic  stenosis.  4. The left atrium is moderately dilated.  ______________________________________________________________________________  Left Ventricle  The left ventricle is normal in size. Left ventricular function is decreased.  The ejection fraction is 50-55% (borderline). A sigmoid septum is present.  Diastolic function not assessed due to atrial fibrillation. There is  borderline global hypokinesia of the left ventricle.     Right Ventricle  The right ventricle is mildly dilated. Mildly decreased right ventricular  systolic function.     Atria  The left atrium is moderately dilated. Right atrium not well visualized. The  right atrium is moderately dilated. There is no color Doppler evidence of an  atrial shunt.     Mitral Valve  There is mild mitral annular calcification. Mitral valve leaflets appear  normal. There is trace to mild mitral regurgitation. There is no mitral valve  stenosis.     Tricuspid Valve  The tricuspid valve is not well visualized. Right ventricular systolic  pressure could not be approximated due to inadequate tricuspid regurgitation.  No tricuspid regurgitation.     Aortic Valve  Severe aortic valve calcification is present. There is trace aortic  regurgitation. Moderate valvular aortic stenosis.     Pulmonic Valve  The pulmonic valve is not well seen, but  is grossly normal. This degree of  valvular regurgitation is within normal limits. There is trace pulmonic  valvular regurgitation.     Vessels  The aorta root is normal. Normal size ascending aorta. Moderate  atherosclerotic plaque(s) in the ascending aorta. IVC diameter <2.1 cm  collapsing >50% with sniff suggests a normal RA pressure of 3 mmHg.     Pericardium  There is no pericardial effusion.     ______________________________________________________________________________  __________________________________________________________________  Report approved by: Diamante Dykes 01/11/2023 12:13 PM       Patient Active Problem List   Diagnosis     Abnormal Weight Loss     Osteoarthritis Of The Shoulder     Chronic Constipation     Onychomycosis Of The Toenails     Diarrhea     Ganglion Of The Left Wrist     Larynx Edema     Obesity     Medial Epicondylitis     Skin Lesion     Urinary Incontinence     Chronic Major Depression     Dry Eye Syndrome     Nicotine Dependence     Hypokalemia     Essential hypertension     Muscle Cramps In The Calf     Edema     Atrial flutter (H)     Lump In / On The Skin     Chronic pain syndrome     Paroxysmal Atrial Fibrillation     Fibromyalgia     Nausea     Abdominal Pain     Peptic Ulcer     Mixed hyperlipidemia     Chronic Reflux Esophagitis     Benign Adenomatous Polyp Of The Large Intestine     Vertigo     Rotator cuff tendonitis     Generalized osteoarthrosis, involving multiple sites     Tendonitis of wrist, left     Trigger point of thoracic region     Flashers or floaters of right eye     Menopause     Tendonitis of wrist, right     Skin lesion of face     Hematoma of abdominal wall     Headache     Cervical neck pain with evidence of disc disease     Controlled substance agreement signed     Aspiration pneumonia (H)     Type 2 diabetes mellitus with hypoglycemia (H)     Candidal intertrigo     Osteoarthritis, hip, bilateral     Primary osteoarthritis of left knee      Osteoarthritis of both knees     Syncope     Opacity of lung on imaging study     Acute gastritis     Gastroenteritis     Nonrheumatic aortic valve stenosis     Bunion, left     Callus of foot     Cataract     Chronic anemia     Anemia due to blood loss, acute     Diabetic polyneuropathy associated with type 2 diabetes mellitus (H)     Upper GI bleed     Melena     Dyslipidemia     Skin lesion     Mixed stress and urge urinary incontinence     Primary osteoarthritis of both knees     Advanced directives, counseling/discussion     Chronic gastric ulcer without hemorrhage and without perforation     Atrial fibrillation, unspecified type (H)     Nicotine dependence     COPD exacerbation (H)     Acute on chronic respiratory failure with hypoxia (H)     Pneumonia of right lower lobe due to infectious organism     Dyspnea, unspecified type     Acute and chronic respiratory failure with hypercapnia (H)     Hypoxia       Karla Mojica, DO  Pulmonary and Critical Care Attending  pgr [phone]    Securely message with the Vocera Web Console (learn more here)

## 2023-01-14 ENCOUNTER — APPOINTMENT (OUTPATIENT)
Dept: OCCUPATIONAL THERAPY | Facility: CLINIC | Age: 73
DRG: 291 | End: 2023-01-14
Attending: INTERNAL MEDICINE
Payer: COMMERCIAL

## 2023-01-14 ENCOUNTER — APPOINTMENT (OUTPATIENT)
Dept: PHYSICAL THERAPY | Facility: CLINIC | Age: 73
DRG: 291 | End: 2023-01-14
Attending: INTERNAL MEDICINE
Payer: COMMERCIAL

## 2023-01-14 LAB
ALBUMIN SERPL-MCNC: 2.6 G/DL (ref 3.5–5)
ALP SERPL-CCNC: 68 U/L (ref 45–120)
ALT SERPL W P-5'-P-CCNC: 29 U/L (ref 0–45)
ANION GAP SERPL CALCULATED.3IONS-SCNC: 12 MMOL/L (ref 5–18)
AST SERPL W P-5'-P-CCNC: 15 U/L (ref 0–40)
BACTERIA BLD CULT: ABNORMAL
BACTERIA BLD CULT: ABNORMAL
BACTERIA SPT CULT: NORMAL
BILIRUB DIRECT SERPL-MCNC: 0.2 MG/DL
BILIRUB SERPL-MCNC: 0.4 MG/DL (ref 0–1)
BUN SERPL-MCNC: 20 MG/DL (ref 8–28)
CALCIUM SERPL-MCNC: 8.5 MG/DL (ref 8.5–10.5)
CHLORIDE BLD-SCNC: 94 MMOL/L (ref 98–107)
CO2 SERPL-SCNC: 37 MMOL/L (ref 22–31)
CREAT SERPL-MCNC: 0.57 MG/DL (ref 0.6–1.1)
GFR SERPL CREATININE-BSD FRML MDRD: >90 ML/MIN/1.73M2
GLUCOSE BLD-MCNC: 83 MG/DL (ref 70–125)
GLUCOSE BLDC GLUCOMTR-MCNC: 167 MG/DL (ref 70–99)
GLUCOSE BLDC GLUCOMTR-MCNC: 236 MG/DL (ref 70–99)
GLUCOSE BLDC GLUCOMTR-MCNC: 81 MG/DL (ref 70–99)
GRAM STAIN RESULT: NORMAL
INR PPP: 3.61 (ref 0.85–1.15)
MAGNESIUM SERPL-MCNC: 1.8 MG/DL (ref 1.8–2.6)
POTASSIUM BLD-SCNC: 3.6 MMOL/L (ref 3.5–5)
PROT SERPL-MCNC: 5.3 G/DL (ref 6–8)
SODIUM SERPL-SCNC: 143 MMOL/L (ref 136–145)

## 2023-01-14 PROCEDURE — 250N000013 HC RX MED GY IP 250 OP 250 PS 637: Performed by: HOSPITALIST

## 2023-01-14 PROCEDURE — 250N000011 HC RX IP 250 OP 636: Performed by: HOSPITALIST

## 2023-01-14 PROCEDURE — 97116 GAIT TRAINING THERAPY: CPT | Mod: GP

## 2023-01-14 PROCEDURE — 97110 THERAPEUTIC EXERCISES: CPT | Mod: GP

## 2023-01-14 PROCEDURE — 97535 SELF CARE MNGMENT TRAINING: CPT | Mod: GO

## 2023-01-14 PROCEDURE — 97162 PT EVAL MOD COMPLEX 30 MIN: CPT | Mod: GP

## 2023-01-14 PROCEDURE — 83735 ASSAY OF MAGNESIUM: CPT | Performed by: HOSPITALIST

## 2023-01-14 PROCEDURE — 80053 COMPREHEN METABOLIC PANEL: CPT | Performed by: HOSPITALIST

## 2023-01-14 PROCEDURE — 999N000157 HC STATISTIC RCP TIME EA 10 MIN

## 2023-01-14 PROCEDURE — 99233 SBSQ HOSP IP/OBS HIGH 50: CPT | Performed by: INTERNAL MEDICINE

## 2023-01-14 PROCEDURE — 97166 OT EVAL MOD COMPLEX 45 MIN: CPT | Mod: GO

## 2023-01-14 PROCEDURE — 94640 AIRWAY INHALATION TREATMENT: CPT | Mod: 76

## 2023-01-14 PROCEDURE — 99232 SBSQ HOSP IP/OBS MODERATE 35: CPT | Performed by: INTERNAL MEDICINE

## 2023-01-14 PROCEDURE — 82248 BILIRUBIN DIRECT: CPT | Performed by: HOSPITALIST

## 2023-01-14 PROCEDURE — 250N000013 HC RX MED GY IP 250 OP 250 PS 637: Performed by: INTERNAL MEDICINE

## 2023-01-14 PROCEDURE — 99232 SBSQ HOSP IP/OBS MODERATE 35: CPT | Performed by: HOSPITALIST

## 2023-01-14 PROCEDURE — 250N000009 HC RX 250: Performed by: HOSPITALIST

## 2023-01-14 PROCEDURE — 258N000003 HC RX IP 258 OP 636: Performed by: HOSPITALIST

## 2023-01-14 PROCEDURE — 999N000215 HC STATISTIC HFNC ADULT NON-CPAP

## 2023-01-14 PROCEDURE — 250N000012 HC RX MED GY IP 250 OP 636 PS 637: Performed by: HOSPITALIST

## 2023-01-14 PROCEDURE — 85610 PROTHROMBIN TIME: CPT | Performed by: HOSPITALIST

## 2023-01-14 PROCEDURE — 94640 AIRWAY INHALATION TREATMENT: CPT

## 2023-01-14 PROCEDURE — C9113 INJ PANTOPRAZOLE SODIUM, VIA: HCPCS | Performed by: HOSPITALIST

## 2023-01-14 PROCEDURE — 120N000004 HC R&B MS OVERFLOW

## 2023-01-14 RX ORDER — FUROSEMIDE 10 MG/ML
40 INJECTION INTRAMUSCULAR; INTRAVENOUS
Status: DISCONTINUED | OUTPATIENT
Start: 2023-01-14 | End: 2023-01-15

## 2023-01-14 RX ORDER — MAGNESIUM SULFATE HEPTAHYDRATE 40 MG/ML
2 INJECTION, SOLUTION INTRAVENOUS ONCE
Status: COMPLETED | OUTPATIENT
Start: 2023-01-14 | End: 2023-01-14

## 2023-01-14 RX ORDER — POTASSIUM CHLORIDE 1500 MG/1
20 TABLET, EXTENDED RELEASE ORAL ONCE
Status: COMPLETED | OUTPATIENT
Start: 2023-01-14 | End: 2023-01-14

## 2023-01-14 RX ORDER — AZITHROMYCIN 500 MG/1
500 TABLET, FILM COATED ORAL DAILY
Status: COMPLETED | OUTPATIENT
Start: 2023-01-14 | End: 2023-01-15

## 2023-01-14 RX ADMIN — DILTIAZEM HYDROCHLORIDE 30 MG: 30 TABLET, FILM COATED ORAL at 22:33

## 2023-01-14 RX ADMIN — IPRATROPIUM BROMIDE AND ALBUTEROL SULFATE 3 ML: 2.5; .5 SOLUTION RESPIRATORY (INHALATION) at 23:24

## 2023-01-14 RX ADMIN — REMDESIVIR 100 MG: 100 INJECTION, POWDER, LYOPHILIZED, FOR SOLUTION INTRAVENOUS at 15:54

## 2023-01-14 RX ADMIN — DILTIAZEM HYDROCHLORIDE 30 MG: 30 TABLET, FILM COATED ORAL at 13:33

## 2023-01-14 RX ADMIN — IPRATROPIUM BROMIDE AND ALBUTEROL SULFATE 3 ML: 2.5; .5 SOLUTION RESPIRATORY (INHALATION) at 15:45

## 2023-01-14 RX ADMIN — SUCRALFATE 1 G: 1 TABLET ORAL at 07:54

## 2023-01-14 RX ADMIN — GABAPENTIN 600 MG: 600 TABLET, FILM COATED ORAL at 20:26

## 2023-01-14 RX ADMIN — MICONAZOLE NITRATE: 20 OINTMENT TOPICAL at 12:42

## 2023-01-14 RX ADMIN — FLUTICASONE FUROATE AND VILANTEROL TRIFENATATE 1 PUFF: 200; 25 POWDER RESPIRATORY (INHALATION) at 07:54

## 2023-01-14 RX ADMIN — SUCRALFATE 1 G: 1 TABLET ORAL at 13:33

## 2023-01-14 RX ADMIN — AZITHROMYCIN MONOHYDRATE 500 MG: 500 TABLET ORAL at 22:33

## 2023-01-14 RX ADMIN — IPRATROPIUM BROMIDE AND ALBUTEROL SULFATE 3 ML: 2.5; .5 SOLUTION RESPIRATORY (INHALATION) at 11:12

## 2023-01-14 RX ADMIN — SODIUM CHLORIDE 50 ML: 9 INJECTION, SOLUTION INTRAVENOUS at 15:54

## 2023-01-14 RX ADMIN — FUROSEMIDE 40 MG: 10 INJECTION, SOLUTION INTRAVENOUS at 17:00

## 2023-01-14 RX ADMIN — SUCRALFATE 1 G: 1 TABLET ORAL at 20:26

## 2023-01-14 RX ADMIN — PREDNISONE 40 MG: 20 TABLET ORAL at 06:31

## 2023-01-14 RX ADMIN — PANTOPRAZOLE SODIUM 40 MG: 40 INJECTION, POWDER, FOR SOLUTION INTRAVENOUS at 07:54

## 2023-01-14 RX ADMIN — POTASSIUM CHLORIDE 20 MEQ: 1500 TABLET, EXTENDED RELEASE ORAL at 06:31

## 2023-01-14 RX ADMIN — MAGNESIUM SULFATE HEPTAHYDRATE 2 G: 40 INJECTION, SOLUTION INTRAVENOUS at 06:31

## 2023-01-14 RX ADMIN — MICONAZOLE NITRATE: 20 OINTMENT TOPICAL at 20:26

## 2023-01-14 RX ADMIN — GABAPENTIN 600 MG: 600 TABLET, FILM COATED ORAL at 13:33

## 2023-01-14 RX ADMIN — GABAPENTIN 600 MG: 600 TABLET, FILM COATED ORAL at 07:54

## 2023-01-14 RX ADMIN — ATORVASTATIN CALCIUM 20 MG: 10 TABLET, FILM COATED ORAL at 22:33

## 2023-01-14 RX ADMIN — DILTIAZEM HYDROCHLORIDE 30 MG: 30 TABLET, FILM COATED ORAL at 06:31

## 2023-01-14 RX ADMIN — IPRATROPIUM BROMIDE AND ALBUTEROL SULFATE 3 ML: 2.5; .5 SOLUTION RESPIRATORY (INHALATION) at 19:52

## 2023-01-14 RX ADMIN — IPRATROPIUM BROMIDE AND ALBUTEROL SULFATE 3 ML: 2.5; .5 SOLUTION RESPIRATORY (INHALATION) at 07:31

## 2023-01-14 ASSESSMENT — ACTIVITIES OF DAILY LIVING (ADL)
ADLS_ACUITY_SCORE: 43
ADLS_ACUITY_SCORE: 39
ADLS_ACUITY_SCORE: 43

## 2023-01-14 NOTE — PROGRESS NOTES
"Occupational Therapy     01/14/23 1320   Appointment Info   Signing Clinician's Name / Credentials (OT) Jacki Mckeon, LINDSEY   Living Environment   People in Home child(david), adult   Current Living Arrangements house   Living Environment Comments One level home with STS, vanity nearby; walk-in shower with chair and grab bars   Self-Care   Usual Activity Tolerance moderate   Current Activity Tolerance poor   Activity/Exercise/Self-Care Comment Ind dressing, toileting, transfers, and showers (set-up needed)   Instrumental Activities of Daily Living (IADL)   IADL Comments Family assist with IADLs   General Information   Onset of Illness/Injury or Date of Surgery 01/10/23   Referring Physician Chikis Valle   Patient/Family Therapy Goal Statement (OT) get moving and back to taking care of myself   Additional Occupational Profile Info/Pertinent History of Current Problem \"Mary Kay Tejada is a 72-year-old female with a history of diabetes, fibromyalgia, paroxysmal atrial fibrillation, nicotine dependence, COPD, hypokalemia coming into the emergency department today for evaluation of dizziness.  She states she had COVID back in November and really has not felt herself since that time.  Says it for the past week she has been feeling quite dizzy.  Especially when she leans or bends over.  Slight cough.  She noted some swelling in her legs.  She decided to come in tonight because she was just not feeling well.  EMS arrival shows heart rate in the 190s, blood pressure stable.  Blood sugar in the 200s.  Brought in for further evaluation\"   Cognitive Status Examination   Orientation Status orientation to person, place and time   Affect/Mental Status (Cognitive) WFL   Follows Commands WFL   Safety Deficit awareness of need for assistance;insight into deficits/self-awareness;ability to follow commands   Range of Motion Comprehensive   Comment, General Range of Motion LUE WFL; RUE shoulder flexion 120 deg   Strength " Comprehensive (MMT)   Comment, General Manual Muscle Testing (MMT) Assessment BUE WFL   Transfers   Transfers sit-stand transfer   Sit-Stand Transfer   Sit-Stand [place] (Transfers) minimum assist (75% patient effort)   Assistive Device (Sit-Stand Transfers) walker, front-wheeled   Clinical Impression   Criteria for Skilled Therapeutic Interventions Met (OT) Yes, treatment indicated   OT Diagnosis Decreased ADL independence   OT Problem List-Impairments impacting ADL activity tolerance impaired;mobility;strength   Assessment of Occupational Performance 3-5 Performance Deficits   Identified Performance Deficits dressing, toileting, bathing, transfers   Planned Therapy Interventions (OT) ADL retraining;progressive activity/exercise;transfer training   Clinical Decision Making Complexity (OT) moderate complexity   Risk & Benefits of therapy have been explained evaluation/treatment results reviewed;care plan/treatment goals reviewed;patient   OT Total Evaluation Time   OT Eval, Moderate Complexity Minutes (57865) 5   OT Goals   Therapy Frequency (OT) Daily   OT Predicted Duration/Target Date for Goal Attainment 01/21/23   OT Goals Lower Body Dressing;Toilet Transfer/Toileting   OT: Lower Body Dressing Modified independent   OT: Toilet Transfer/Toileting Modified independent   Interventions   Interventions Quick Adds Self-Care/Home Management   Self-Care/Home Management   Self-Care/Home Mgmt/ADL, Compensatory, Meal Prep Minutes (00309) 10   Symptoms Noted During/After Treatment (Meal Preparation/Planning Training) none   Treatment Detail/Skilled Intervention RN ok'd session but with limited activity; Pt completed LB dressing with Min A. Sit<>stand transfers with FWW and Min A; educ on safe transfer technique and breathing strategies. Pt declines g/h tasks in session. O2 sats stable >90 throughout.   OT Discharge Planning   OT Plan 1/21- progressive activity; standing as able, dressing, toileting, bed mob   OT  Discharge Recommendation (DC Rec) home with assist;home with home care occupational therapy   OT Rationale for DC Rec Anticipate d/c home, pending progress; Pt has supportive family/setup at home. Home OT to improve activity tolerance and safety/independence with ADLs. If home, will need 24/7 supervision at this time.   OT Brief overview of current status Min A ADLs; Min A transfers   Total Session Time   Timed Code Treatment Minutes 10   Total Session Time (sum of timed and untimed services) 15     Jacki Mckeon OTR/L 1/14/2023

## 2023-01-14 NOTE — PROGRESS NOTES
Care Management Follow Up    Length of Stay (days): 4    Expected Discharge Date: 01/16/2023     Concerns to be Addressed:high flow 02, IV medications, discharge planning       Patient plan of care discussed at interdisciplinary rounds: Yes    Anticipated Discharge Disposition: DME, Home Care     Anticipated Discharge Services: Housekeeping/[employer], PCA, County Worker    Anticipated Discharge DME: Oxygen  (Pending clinical progress)      Education Provided on the Discharge Plan:  AVS per bedside RN    Patient/Family in Agreement with the Plan: yes    Referrals Placed by CM/SW:  N/a    Private pay costs discussed: Not applicable    Additional Information:  Chart reviewed. Patient is Covid+.  High flow nasal cannula 50%   this AM. Continue with IV diuresis for CHF. Plan per Infectious Disease MD includes: Remdesivir through tomorrow  Azithromycin through tomorrow  Ceftriaxone stopped  Already on steroids for COPD  Improving with diuresis Transportation TBD. CM will follow.       Roslyn Duarte RN

## 2023-01-14 NOTE — PROGRESS NOTES
[facility]    Medicine Progress Note - Hospitalist Service    Date of Admission:  1/10/2023    Assessment & Plan   72 year old female with a past medical history of COPD, HTN, HLD, atrial fibrillation on warfarin, moderate aortic stenosis, history of HFpEF who presented with shortness of breath. In the ER had SVT and treated with adenosine and then cardioversion.    Admitted on 1/10 with acute hypoxic respiratory failure requiring BiPAP, initially with unclear inciting event.  Noted to have a moderate right pleural effusion and underwent therapeutic thoracentesis on 1/11 with 1 L transitive fluid removed.  Initially started on ceftriaxone and azithromycin for possible community-acquired pneumonia.  TTE was done which noted EF 55%, borderline hypokinesis of LV, aortic valve calcifications with moderate aortic stenosis.  It was then felt the patient was fluid overloaded given her symptoms of lower extremity edema and was started on IV diuresis on 1/11.    Patient also noted to be COVID-19 positive on admission; but did report having a COVID-19 infection approximately 2 months prior.  Was started on remdesivir although felt less likely that COVID-19 is contributing to her respiratory status.    Patient had a CT chest done on 1/11 which noted bilateral pleural effusions and interstitial thickening consistent with CHF, and small patchy opacities in right middle lobe possibly representing pneumonia. On 1/12 had increasing O2 requirements, up to 100% FiO2 BiPAP.  Subsequent transfer to ICU for closer monitoring.  O2 requirements improving on 1/13 down to 70% HFNC and 50% the following day.    - Continue to wean O2 as tolerated, down to 50% HFNC this AM.  Continue with IV diuresis for CHF. Plan down-grade to floor.    Acute hypoxic respiratory failure  Acute on chronic HFpEF with moderate right pleural effusion s/p Thoracentesis  Bilateral Pleural Effusions  Moderate AS  -FiO2 70 -> 50% this AM,  clinically doing well  -ICU consulted, appreciate recs  -Status post thoracentesis on 1/11, transudative fluid per lights criteria   -Fluid cultures negative so far  -Lasix IV 40 mg BID (home dose 20 mg Daily), diuresing well  -If able to ambulate will plan to remove paiz  -PT/OT consulted    Less Likely Bacterial PNA  COVID-19 Positive (less likely active infection)  -ID consulted, appreciate recs  -Thoracentesis fluid going against exudative process such as bacterial pneumonia  -Azithromycin (started 1/10) for possible COPD flare  -Stop Ceftriaxone given lower suspicion of bacterial PNA  -Remdesivir (start date 1/11) for treatment/prophylaxis given positive COVID-19 status    Positive Blood Culture  1 of 4 positive for Staph homnis at 2 days, likely contaminant given no fevers.  - No tx necessary at this time    COPD  With possible mild exacerbation  -Prednisone 40 mg Daily x5 days  -DuoNebs  -Substitute home Symbicort for Breo Yogeshta     SVT   In the ER. S/p adenosine 12 mg and then cardioversion.  -Telemetry  -Continue home Diltiazem 30 q8H  -Monitor electrolytes    Diabetes Mellitus Type 2 without known complications  Home Regimen: Metformin 500 mg BID Last A1c: 5.8% (7/2022)  - Hold oral medication  - Moderate Dose ISS  - Target glucose 140-180    Chronic Problems  Chronic Afib and on coumadin   - Pharm to manage coumadin, appreciate recs  - INR goal 2-3      HLD  - PTA meds      Chronic pain issues   - Continue home Gabapentin  - Holding home opioids       Diet: Fluid restriction 1800 ML FLUID  Combination Diet 2 gm NA Diet    DVT Prophylaxis: Warfarin  Paiz Catheter: PRESENT, indication: Strict 1-2 Hour I&O  Lines: PRESENT      PICC 01/12/23 Triple Lumen Right Basilic Vascular Access; Medication Drips-Site Assessment: WDL      Cardiac Monitoring: ACTIVE order. Indication: Per MD ICU transfer order  Code Status: Full Code      Clinically Significant Risk Factors              # Hypoalbuminemia: Lowest  "albumin = 2.6 g/dL at 1/14/2023  4:00 AM, will monitor as appropriate            # Overweight: Estimated body mass index is 25.16 kg/m  as calculated from the following:    Height as of this encounter: 1.651 m (5' 5\").    Weight as of this encounter: 68.6 kg (151 lb 3.2 oz)., PRESENT ON ADMISSION         Disposition Plan     Expected Discharge Date: 01/16/2023      Destination: home with family;home with help/services  Discharge Comments: Requiring HFNC; plan to wean O2 and diurese prior to d/c          Louis Dutta MD  Hospitalist Service  [facility]  Securely message with [employer] (more info)  Text page via [employer] Paging/Directory   ______________________________________________________________________    Interval History   No acute events overnight.    Patient seen and evaluated at bedside.  Denies any chest pain, or abdominal pain.  No significant difficulty breathing as well.  While I was in the room physical therapy was in the patient and regarding get her up.  Explained that if she is moving around relatively well we will plan to discontinue Chairez per    Physical Exam   Vital Signs: Temp: 99  F (37.2  C) Temp src: Oral BP: 100/55 Pulse: 86   Resp: 18 SpO2: 92 % O2 Device: High Flow Nasal Cannula (HFNC) Oxygen Delivery: 45 LPM  Weight: 151 lbs 3.2 oz    General: NAD sitting at the edge of bed  HEENT: NC/AT, anicteric sclera, external ears normal  Neck: supple  CV: unable to auscultate due to PAPR use, no peripheral edema  Resp: unable to auscultate due to PAPR use, no respiratory distress or sensory muscle use   Abdomen: soft, NT, no guarding  Skin: warm, dry  Neuro: alert, cranial nerves grossly intact  Psych: normal insight and judgement, appropriate speech    Medical Decision Making       40 minutes MINUTES SPENT BY ME on the date of service doing chart review, history, exam, documentation & further activities per the note.       Data     I have personally reviewed the following " data over the past 24 hrs:    N/A  \   N/A   / N/A     143 94 (L) 20 /  81   3.6 37 (H) 0.57 (L) \       ALT: 29 AST: 15 AP: 68 TBILI: 0.4   ALB: 2.6 (L) TOT PROTEIN: 5.3 (L) LIPASE: N/A       INR:  3.61 (H) PTT:  N/A   D-dimer:  N/A Fibrinogen:  N/A       Imaging results reviewed over the past 24 hrs:   No results found for this or any previous visit (from the past 24 hour(s)).

## 2023-01-14 NOTE — PROGRESS NOTES
[facility]  Infectious Disease   Progress Note     Date of Admission:  1/10/2023    Assessment & Plan   CHF,SVT, aortic stenosis, right pleural effusion, Transudative  Possible right sided pneumonia, versus compressive atelectasis (A small region of patchy opacities in the right middle lobe ). Procalcitonin normal   COVID PCR positive.  She was positive in November.  Possible reinfection versus persistent shedding from November- at any rate no typical COVID features on CT chest  Troponin normal  Active smoker  Frail elderly  BC >>S hominis- contaminant not treating    PLAN  Remdesivir through tomorrow  Azithromycin through tomorrow  Ceftriaxone stopped  Already on steroids for COPD  Improving with diuresis    I will sign-off at this time. Please do not hesitate to call if there are any further questions or if there is a change in the patient's clinical status.     David June MD  [facility]  Direct messaging: [employer] Paging  On-Call ID provider: [phone], option: 9      ______________________________________________________________________    Interval History   First visit by me. Feeling better. On HFNC. Getting diuresis.      Physical Exam   Vital Signs: Temp: 98.6  F (37  C) Temp src: Oral BP: 101/57 Pulse: 78   Resp: 29 SpO2: 94 % O2 Device: High Flow Nasal Cannula (HFNC) Oxygen Delivery: 45 LPM  Weight: 151 lbs 3.2 oz   Gen. appearance on oxygen  Eyes no conjunctivitis or icterus  Extremities no synovitis, trace edema  Lungs: faint left sided crackles, otherwise clear  Skin  no rash  Neurologic alert oriented no focal deficits      Data   Results for orders placed or performed during the hospital encounter of 01/10/23 (from the past 24 hour(s))   Glucose by meter   Result Value Ref Range    GLUCOSE BY METER POCT 166 (H) 70 - 99 mg/dL   Glucose by meter   Result Value Ref Range    GLUCOSE BY METER POCT 133 (H) 70 - 99 mg/dL   Glucose by meter   Result  Value Ref Range    GLUCOSE BY METER POCT 132 (H) 70 - 99 mg/dL   Basic metabolic panel   Result Value Ref Range    Sodium 143 136 - 145 mmol/L    Potassium 3.6 3.5 - 5.0 mmol/L    Chloride 94 (L) 98 - 107 mmol/L    Carbon Dioxide (CO2) 37 (H) 22 - 31 mmol/L    Anion Gap 12 5 - 18 mmol/L    Urea Nitrogen 20 8 - 28 mg/dL    Creatinine 0.57 (L) 0.60 - 1.10 mg/dL    Calcium 8.5 8.5 - 10.5 mg/dL    Glucose 83 70 - 125 mg/dL    GFR Estimate >90 >60 mL/min/1.73m2   Hepatic function panel   Result Value Ref Range    Bilirubin Total 0.4 0.0 - 1.0 mg/dL    Bilirubin Direct 0.2 <=0.5 mg/dL    Protein Total 5.3 (L) 6.0 - 8.0 g/dL    Albumin 2.6 (L) 3.5 - 5.0 g/dL    Alkaline Phosphatase 68 45 - 120 U/L    AST 15 0 - 40 U/L    ALT 29 0 - 45 U/L   INR   Result Value Ref Range    INR 3.61 (H) 0.85 - 1.15   Magnesium   Result Value Ref Range    Magnesium 1.8 1.8 - 2.6 mg/dL   Glucose by meter   Result Value Ref Range    GLUCOSE BY METER POCT 81 70 - 99 mg/dL

## 2023-01-14 NOTE — PROGRESS NOTES
Pt remains on HFNC 45L/50% FiO2. O2 sats low 90s. BS diminished. Pt received Duoneb q4h while awake. BiPAP discontinued today. RT will continue to follow.    Luis Manuel Roa, RT

## 2023-01-14 NOTE — PROGRESS NOTES
PULMONARY MEDICINE PROGRESS NOTE  1/14/2023    Admit Date: 1/10/2023  CODE: Full Code    Reason for Consult: acute hypoxic respiratory failure    Assessment/Plan:   73 year old female with a history of active tobacco dependence, presumed COPD with no PFTs on record on home oxygen, DM, fibromyalgia, moderate aortic stenosis, HFpEF with nonadherence to low-sodium diet, presented on 1/10 with hypertension, tachycardia, anasarca, found to be hypoxic with pulmonary edema and large bilateral pleural effusions (transudative right pleural fluid), being treated for ADHF and empirically for possible AECOPD and pneumonia, though less likely bacterial pneumonia clinically, in ICU on HFNC, found to be covid-positive    Acute hypoxic respiratory failure: Likely primarily ADHF with HFpEF and moderate aortic stenosis. Large bilateral pleural effusions, right thoracentesis with one liter removed, transudative. Patient smokes 6 cigarettes daily. She is interested in trying nicotine nasal spray outpatient; had rash with patches. No PFTs on record; does have some radiographic emphysema, difficult to evaluate extent with superimposed pulmonary edema. She notes that she eats potato chips, etc, and has not been good at adhering to low-sodium diet outpatient. Net negative 9600 mL for hospitalization, 2000 mL last 24 hours. Bacterial pneumonia unlikely. COVID-19 positive; up to date with vaccination.    Plan:  - continue diuresis  - brief prednisone burst  - nebulized bronchodilators  - remdesivir  - agree with discontinuing antibiotics  - wean down HFNC, transition to low-flow oxygen if able; target SpO2 88-92%  - no longer needing BiPAP  - tobacco cessation essential; patient interested in trying nicotine nasal spray outpatient  - patient does not want outpatient pulmonary function testing  - has home oxygen  - case discussed with Dr. Dutta  - will follow    Pablito Jha MD  Pulmonary and Critical Care Medicine  [facility]  "Clinic  [place]  Office [phone]  Pager [phone]  he/him                                                                                                                                                        SUBJECTIVE/INTERVAL HISTORY   Breathing feels better. Denies pain. Thinks she is ready to quit smoking. States that she does not adhere to a low-sodium diet.                                                                                                                                                      Exam/Data:     Vitals  /57 (BP Location: Left arm)   Pulse 78   Temp 98.6  F (37  C) (Oral)   Resp 29   Ht 1.651 m (5' 5\")   Wt 68.6 kg (151 lb 3.2 oz)   SpO2 92%   BMI 25.16 kg/m    BP - Mean:  [65-91] 73  I/O last 3 completed shifts:  In: 1480 [P.O.:1060; I.V.:420]  Out: 3525 [Urine:3525]  Weight change: -0.516 kg (-1 lb 2.2 oz)  [unfilled]  EXAM:  /57 (BP Location: Left arm)   Pulse 78   Temp 98.6  F (37  C) (Oral)   Resp 29   Ht 1.651 m (5' 5\")   Wt 68.6 kg (151 lb 3.2 oz)   SpO2 92%   BMI 25.16 kg/m      Intake/Output last 3 shifts:  I/O last 3 completed shifts:  In: 1480 [P.O.:1060; I.V.:420]  Out: 3525 [Urine:3525]  Intake/Output this shift:  No intake/output data recorded.    Physical Exam  Gen: alert, oriented, no distress  HEENT: NT, no AUGUST  CV: RRR, no m/g/r  Resp: diminished bilaterally dependent lung fields, no focal crackles  Abd: soft, nontender, BS+  Skin: no rashes or lesions  Ext: trace edema  Neuro: PERRL, nonfocal exam    ROS: A complete 10-system review of systems was obtained and is negative with the exception of what is noted in the history of present illness.    Medications:       - MEDICATION INSTRUCTIONS -         remdesivir  100 mg Intravenous Q24H    And     sodium chloride 0.9%  50 mL Intravenous Q24H     atorvastatin  20 mg Oral At Bedtime     azithromycin  500 mg Oral Daily     diltiazem  30 mg Oral Q8H DOMENICA     fluticasone-vilanterol  1 puff " Inhalation Daily     furosemide  40 mg Intravenous BID     gabapentin  600 mg Oral TID     insulin aspart  1-7 Units Subcutaneous TID w/meals     ipratropium - albuterol 0.5 mg/2.5 mg/3 mL  3 mL Nebulization Q4H While awake     Miconazole Nitrate   Topical BID     pantoprazole  40 mg Intravenous Daily with breakfast     predniSONE  40 mg Oral Daily     sodium chloride (PF)  3 mL Intracatheter Q8H     sucralfate  1 g Oral TID         DATA  All laboratory and radiology has been personally reviewed by myself today.  Recent Labs   Lab 01/12/23  0435   WBC 10.1   HGB 8.2*   HCT 30.7*        Recent Labs   Lab 01/14/23  0400 01/13/23  0452 01/12/23  0435    142 142   CO2 37* 38* 34*   BUN 20 19 21   ALKPHOS 68 79 85   ALT 29 35 48*   AST 15 16 23       PFT DATA:  None on record    IMAGING:   CT chest (1/12/23):  - images directly reviewed, formal interpretation follows:  FINDINGS:     LUNGS AND PLEURA: Mild to moderate emphysematous changes in the lungs. Interstitial thickening throughout both lungs. A small region of patchy opacities in the right middle lobe (series 5 image 158). Small to moderate-sized right pleural effusion with   adjacent atelectasis and small left pleural effusion with adjacent atelectasis.     MEDIASTINUM/AXILLAE: Mild cardiomegaly. A few nonspecific borderline enlarged mediastinal lymph nodes.     CORONARY ARTERY CALCIFICATION: Present.     MUSCULOSKELETAL: No acute findings.     UPPER ABDOMEN: Unremarkable.                                                                      IMPRESSION:   1. Interstitial thickening throughout both lungs, likely relating to interstitial pulmonary edema. There are also a small to moderate-sized right pleural effusion and small left pleural effusion.   2. A small region of patchy opacities in the right middle lobe, likely representing pneumonia.  3. Mild to moderate emphysematous changes in the lungs.

## 2023-01-14 NOTE — PLAN OF CARE
Pt remains on HFNC during shift, unable to wean down. Tolerating clear liquids. Pt had 2 runs of HR in the 120s for less than 20 seconds. No symptoms and BP stable at that time. Good response to IV diuretics. Able to make needs known. Denies pain.

## 2023-01-14 NOTE — PROGRESS NOTES
01/14/23 0900   Appointment Info   Signing Clinician's Name / Credentials (PT) Julio Britton DPT   Living Environment   People in Home child(david), adult   Current Living Arrangements house   Transportation Anticipated family or friend will provide   Self-Care   Usual Activity Tolerance moderate   Current Activity Tolerance fair   Regular Exercise No   Equipment Currently Used at Home cane, straight;walker, rolling   Fall history within last six months no   General Information   Onset of Illness/Injury or Date of Surgery 01/10/23   Referring Physician Louis Dutta MD   Patient/Family Therapy Goals Statement (PT) get back home   Pertinent History of Current Problem (include personal factors and/or comorbidities that impact the POC) Mary Kay Tejada is a 72-year-old female with a history of diabetes, fibromyalgia, paroxysmal atrial fibrillation, nicotine dependence, COPD, hypokalemia coming into the emergency department today for evaluation of dizziness.  She states she had COVID back in November and really has not felt herself since that time.  Says it for the past week she has been feeling quite dizzy.  Especially when she leans or bends over.  Slight cough.  She noted some swelling in her legs.  She decided to come in tonight because she was just not feeling well.  EMS arrival shows heart rate in the 190s, blood pressure stable.  Blood sugar in the 200s.  Brought in for further evaluation   Cognition   Affect/Mental Status (Cognition) WFL   Orientation Status (Cognition) oriented x 3   Follows Commands (Cognition) WFL   Posture    Posture Forward head position;Protracted shoulders   Range of Motion (ROM)   Range of Motion ROM is WFL   Strength (Manual Muscle Testing)   Strength Comments reduced LE strength as assessed by functional mobility   Bed Mobility   Bed Mobility supine-sit   Supine-Sit [place] (Bed Mobility) minimum assist (75% patient effort)   Transfers   Transfers sit-stand transfer    Sit-Stand Transfer   Sit-Stand [place] (Transfers) minimum assist (75% patient effort)   Assistive Device (Sit-Stand Transfers) walker, front-wheeled   Gait/Stairs (Locomotion)   Distance in Feet (Gait) 1'   Clinical Impression   Criteria for Skilled Therapeutic Intervention Yes, treatment indicated   PT Diagnosis (PT) impaired functional mobility   Influenced by the following impairments weakness, SOB   Functional limitations due to impairments gait, transfers   Clinical Presentation (PT Evaluation Complexity) Evolving/Changing   Clinical Presentation Rationale presents as medically diagnosed   Clinical Decision Making (Complexity) moderate complexity   Planned Therapy Interventions (PT) gait training;strengthening;stair training;home exercise program;transfer training   Anticipated Equipment Needs at Discharge (PT) walker, rolling   Risk & Benefits of therapy have been explained evaluation/treatment results reviewed;care plan/treatment goals reviewed;current/potential barriers reviewed;participants voiced agreement with care plan;participants included;patient   PT Total Evaluation Time   PT Eval, Moderate Complexity Minutes (68105) 15   Physical Therapy Goals   PT Frequency Daily   PT Predicted Duration/Target Date for Goal Attainment 01/21/23   PT Goals Gait;Transfers   PT: Transfers Modified independent;Sit to/from stand   PT: Gait Modified independent;50 feet;Rolling walker   Interventions   Interventions Quick Adds Gait Training;Therapeutic Procedure   Therapeutic Procedure/Exercise   Ther. Procedure: strength, endurance, ROM, flexibillity Minutes (94439) 10   Symptoms Noted During/After Treatment fatigue   Treatment Detail/Skilled Intervention seated LAQ and marching. Vitals remained stable with O2 88-91% throughout. performed with verbal and visual cues for performance.   Gait Training   Gait Training Minutes (62725) 10   Symptoms Noted During/After Treatment (Gait Training) fatigue;shortness of  breath   Treatment Detail/Skilled Intervention STS with Min A x 1. SPT to chair with CGA, with verbal cues for pacing and hand placement. STS with Min A x 1. O2 downtrended to 84% upon standing, however recovered to 89% withing 5 seconds of standing. discussed with nursing, who report that she does have poor signal on pulse oximetry.   [place] Level (Gait Training) minimum assist (75% patient effort)   Physical Assistance Level (Gait Training) 1 person assist   Assistive Device (Gait Training) rolling walker   Gait Analysis Deviations decreased lillie;increased time in double stance   Impairments (Gait Analysis/Training) strength decreased   PT Discharge Planning   PT Plan transfers, gait, LE ex   PT Discharge Recommendation (DC Rec) (S)  home with assist;home with home care physical therapy;Transitional Care Facility   PT Rationale for DC Rec pending progress in hospital, currently requires min A for transfers and bed mobility. supportive home environment with son's GF providing cares   Total Session Time   Timed Code Treatment Minutes 20   Total Session Time (sum of timed and untimed services) 35

## 2023-01-14 NOTE — PLAN OF CARE
Pt remains on HFNC. Current settings: 45L, 50%. Tolerating well. VSS. Denies pain. IV diuresing per MAR. K and mag to be replaced this AM per protocol. Pt using call light appropriately. Resting between cares overnight. Pt states goal for today is to advance diet and activity. Will continue to monitor and follow plan of care.    Problem: Plan of Care - These are the overarching goals to be used throughout the patient stay.    Goal: Absence of Hospital-Acquired Illness or Injury    Problem: Heart Failure  Goal: Effective Oxygenation and Ventilation  Intervention: Optimize Oxygenation and Ventilation  Recent Flowsheet Documentation  Taken 1/14/2023 0400 by Shalini Moffett, RN  Head of Bed (HOB) Positioning: HOB at 30-45 degrees  Taken 1/14/2023 0000 by Shalini Moffett, RN  Head of Bed (HOB) Positioning: HOB at 30-45 degrees      Problem: Heart Failure  Goal: Fluid and Electrolyte Balance  Outcome: Progressing   Goal Outcome Evaluation:

## 2023-01-14 NOTE — PROGRESS NOTES
RT Note:    Scheduled Q4w/a DuoNeb given. Pt on HFNC weaned down to 55% 45L. Stable vitals when resting in bed. Decreased SpO2 with activity. Pt A&O and able to make needs known. BiPAP on standby in room. Will continue to monitor and assess pt.

## 2023-01-15 ENCOUNTER — APPOINTMENT (OUTPATIENT)
Dept: OCCUPATIONAL THERAPY | Facility: CLINIC | Age: 73
DRG: 291 | End: 2023-01-15
Payer: COMMERCIAL

## 2023-01-15 LAB
ALBUMIN SERPL-MCNC: 2.9 G/DL (ref 3.5–5)
ALP SERPL-CCNC: 71 U/L (ref 45–120)
ALT SERPL W P-5'-P-CCNC: 26 U/L (ref 0–45)
ANION GAP SERPL CALCULATED.3IONS-SCNC: 8 MMOL/L (ref 5–18)
AST SERPL W P-5'-P-CCNC: 13 U/L (ref 0–40)
BILIRUB DIRECT SERPL-MCNC: 0.3 MG/DL
BILIRUB SERPL-MCNC: 0.6 MG/DL (ref 0–1)
BUN SERPL-MCNC: 23 MG/DL (ref 8–28)
CALCIUM SERPL-MCNC: 9 MG/DL (ref 8.5–10.5)
CHLORIDE BLD-SCNC: 95 MMOL/L (ref 98–107)
CO2 SERPL-SCNC: 38 MMOL/L (ref 22–31)
CREAT SERPL-MCNC: 0.59 MG/DL (ref 0.6–1.1)
ERYTHROCYTE [DISTWIDTH] IN BLOOD BY AUTOMATED COUNT: 21.4 % (ref 10–15)
GFR SERPL CREATININE-BSD FRML MDRD: >90 ML/MIN/1.73M2
GLUCOSE BLD-MCNC: 94 MG/DL (ref 70–125)
GLUCOSE BLDC GLUCOMTR-MCNC: 108 MG/DL (ref 70–99)
GLUCOSE BLDC GLUCOMTR-MCNC: 173 MG/DL (ref 70–99)
GLUCOSE BLDC GLUCOMTR-MCNC: 198 MG/DL (ref 70–99)
GLUCOSE BLDC GLUCOMTR-MCNC: 205 MG/DL (ref 70–99)
HCT VFR BLD AUTO: 28.7 % (ref 35–47)
HGB BLD-MCNC: 8.2 G/DL (ref 11.7–15.7)
INR PPP: 2.83 (ref 0.85–1.15)
MAGNESIUM SERPL-MCNC: 1.9 MG/DL (ref 1.8–2.6)
MCH RBC QN AUTO: 21.6 PG (ref 26.5–33)
MCHC RBC AUTO-ENTMCNC: 28.6 G/DL (ref 31.5–36.5)
MCV RBC AUTO: 76 FL (ref 78–100)
PHOSPHATE SERPL-MCNC: 2.3 MG/DL (ref 2.5–4.5)
PLATELET # BLD AUTO: 274 10E3/UL (ref 150–450)
POTASSIUM BLD-SCNC: 3.8 MMOL/L (ref 3.5–5)
PROT SERPL-MCNC: 5.7 G/DL (ref 6–8)
RBC # BLD AUTO: 3.8 10E6/UL (ref 3.8–5.2)
SODIUM SERPL-SCNC: 141 MMOL/L (ref 136–145)
WBC # BLD AUTO: 8.8 10E3/UL (ref 4–11)

## 2023-01-15 PROCEDURE — 83735 ASSAY OF MAGNESIUM: CPT | Performed by: HOSPITALIST

## 2023-01-15 PROCEDURE — 85027 COMPLETE CBC AUTOMATED: CPT | Performed by: HOSPITALIST

## 2023-01-15 PROCEDURE — 250N000013 HC RX MED GY IP 250 OP 250 PS 637: Performed by: INTERNAL MEDICINE

## 2023-01-15 PROCEDURE — 99233 SBSQ HOSP IP/OBS HIGH 50: CPT | Performed by: INTERNAL MEDICINE

## 2023-01-15 PROCEDURE — 84100 ASSAY OF PHOSPHORUS: CPT | Performed by: HOSPITALIST

## 2023-01-15 PROCEDURE — 250N000009 HC RX 250: Performed by: HOSPITALIST

## 2023-01-15 PROCEDURE — 250N000012 HC RX MED GY IP 250 OP 636 PS 637: Performed by: HOSPITALIST

## 2023-01-15 PROCEDURE — 999N000157 HC STATISTIC RCP TIME EA 10 MIN

## 2023-01-15 PROCEDURE — 258N000003 HC RX IP 258 OP 636: Performed by: HOSPITALIST

## 2023-01-15 PROCEDURE — 99232 SBSQ HOSP IP/OBS MODERATE 35: CPT | Performed by: HOSPITALIST

## 2023-01-15 PROCEDURE — 80053 COMPREHEN METABOLIC PANEL: CPT | Performed by: HOSPITALIST

## 2023-01-15 PROCEDURE — C9113 INJ PANTOPRAZOLE SODIUM, VIA: HCPCS | Performed by: HOSPITALIST

## 2023-01-15 PROCEDURE — 250N000013 HC RX MED GY IP 250 OP 250 PS 637: Performed by: HOSPITALIST

## 2023-01-15 PROCEDURE — 250N000011 HC RX IP 250 OP 636: Performed by: HOSPITALIST

## 2023-01-15 PROCEDURE — 85610 PROTHROMBIN TIME: CPT | Performed by: HOSPITALIST

## 2023-01-15 PROCEDURE — 120N000004 HC R&B MS OVERFLOW

## 2023-01-15 PROCEDURE — 82248 BILIRUBIN DIRECT: CPT | Performed by: INTERNAL MEDICINE

## 2023-01-15 PROCEDURE — 94640 AIRWAY INHALATION TREATMENT: CPT | Mod: 76

## 2023-01-15 PROCEDURE — 94640 AIRWAY INHALATION TREATMENT: CPT

## 2023-01-15 PROCEDURE — 97110 THERAPEUTIC EXERCISES: CPT | Mod: GO

## 2023-01-15 PROCEDURE — 97535 SELF CARE MNGMENT TRAINING: CPT | Mod: GO

## 2023-01-15 RX ORDER — WARFARIN SODIUM 1 MG/1
1 TABLET ORAL
Status: COMPLETED | OUTPATIENT
Start: 2023-01-15 | End: 2023-01-15

## 2023-01-15 RX ORDER — DILTIAZEM HYDROCHLORIDE 120 MG/1
120 CAPSULE, COATED, EXTENDED RELEASE ORAL DAILY
Status: DISCONTINUED | OUTPATIENT
Start: 2023-01-15 | End: 2023-01-19 | Stop reason: HOSPADM

## 2023-01-15 RX ORDER — POTASSIUM CHLORIDE 20MEQ/15ML
20 LIQUID (ML) ORAL ONCE
Status: COMPLETED | OUTPATIENT
Start: 2023-01-15 | End: 2023-01-15

## 2023-01-15 RX ORDER — MAGNESIUM OXIDE 400 MG/1
400 TABLET ORAL EVERY 4 HOURS
Status: COMPLETED | OUTPATIENT
Start: 2023-01-15 | End: 2023-01-15

## 2023-01-15 RX ORDER — FUROSEMIDE 10 MG/ML
40 INJECTION INTRAMUSCULAR; INTRAVENOUS 3 TIMES DAILY
Status: DISCONTINUED | OUTPATIENT
Start: 2023-01-15 | End: 2023-01-18

## 2023-01-15 RX ADMIN — ATORVASTATIN CALCIUM 20 MG: 10 TABLET, FILM COATED ORAL at 21:00

## 2023-01-15 RX ADMIN — IPRATROPIUM BROMIDE AND ALBUTEROL SULFATE 3 ML: 2.5; .5 SOLUTION RESPIRATORY (INHALATION) at 15:16

## 2023-01-15 RX ADMIN — MAGNESIUM OXIDE TAB 400 MG (241.3 MG ELEMENTAL MG) 400 MG: 400 (241.3 MG) TAB at 09:08

## 2023-01-15 RX ADMIN — GABAPENTIN 600 MG: 600 TABLET, FILM COATED ORAL at 09:08

## 2023-01-15 RX ADMIN — WARFARIN SODIUM 1 MG: 1 TABLET ORAL at 17:27

## 2023-01-15 RX ADMIN — SUCRALFATE 1 G: 1 TABLET ORAL at 09:08

## 2023-01-15 RX ADMIN — FUROSEMIDE 40 MG: 10 INJECTION, SOLUTION INTRAMUSCULAR; INTRAVENOUS at 08:00

## 2023-01-15 RX ADMIN — FUROSEMIDE 40 MG: 10 INJECTION, SOLUTION INTRAMUSCULAR; INTRAVENOUS at 20:37

## 2023-01-15 RX ADMIN — MICONAZOLE NITRATE: 20 OINTMENT TOPICAL at 20:38

## 2023-01-15 RX ADMIN — DILTIAZEM HYDROCHLORIDE 120 MG: 120 CAPSULE, COATED, EXTENDED RELEASE ORAL at 09:08

## 2023-01-15 RX ADMIN — GABAPENTIN 600 MG: 600 TABLET, FILM COATED ORAL at 20:37

## 2023-01-15 RX ADMIN — FUROSEMIDE 40 MG: 10 INJECTION, SOLUTION INTRAMUSCULAR; INTRAVENOUS at 14:51

## 2023-01-15 RX ADMIN — MAGNESIUM OXIDE TAB 400 MG (241.3 MG ELEMENTAL MG) 400 MG: 400 (241.3 MG) TAB at 11:43

## 2023-01-15 RX ADMIN — GABAPENTIN 600 MG: 600 TABLET, FILM COATED ORAL at 14:51

## 2023-01-15 RX ADMIN — AZITHROMYCIN MONOHYDRATE 500 MG: 500 TABLET ORAL at 22:13

## 2023-01-15 RX ADMIN — POTASSIUM CHLORIDE 20 MEQ: 1.5 SOLUTION ORAL at 09:08

## 2023-01-15 RX ADMIN — MICONAZOLE NITRATE: 20 OINTMENT TOPICAL at 09:08

## 2023-01-15 RX ADMIN — PANTOPRAZOLE SODIUM 40 MG: 40 INJECTION, POWDER, FOR SOLUTION INTRAVENOUS at 09:08

## 2023-01-15 RX ADMIN — SUCRALFATE 1 G: 1 TABLET ORAL at 14:51

## 2023-01-15 RX ADMIN — REMDESIVIR 100 MG: 100 INJECTION, POWDER, LYOPHILIZED, FOR SOLUTION INTRAVENOUS at 16:21

## 2023-01-15 RX ADMIN — IPRATROPIUM BROMIDE AND ALBUTEROL SULFATE 3 ML: 2.5; .5 SOLUTION RESPIRATORY (INHALATION) at 08:28

## 2023-01-15 RX ADMIN — DILTIAZEM HYDROCHLORIDE 30 MG: 30 TABLET, FILM COATED ORAL at 05:40

## 2023-01-15 RX ADMIN — SUCRALFATE 1 G: 1 TABLET ORAL at 20:37

## 2023-01-15 RX ADMIN — IPRATROPIUM BROMIDE AND ALBUTEROL SULFATE 3 ML: 2.5; .5 SOLUTION RESPIRATORY (INHALATION) at 11:19

## 2023-01-15 RX ADMIN — SODIUM CHLORIDE 50 ML: 9 INJECTION, SOLUTION INTRAVENOUS at 16:22

## 2023-01-15 RX ADMIN — PREDNISONE 40 MG: 20 TABLET ORAL at 05:40

## 2023-01-15 ASSESSMENT — ACTIVITIES OF DAILY LIVING (ADL)
ADLS_ACUITY_SCORE: 39

## 2023-01-15 NOTE — PLAN OF CARE
Problem: Heart Failure  Goal: Stable Heart Rate and Rhythm  Outcome: Progressing  Goal: Fluid and Electrolyte Balance  Outcome: Progressing   Goal Outcome Evaluation:        (7911-8548) Shift Events:     VSS, No acute events this shift. Pt. A/Ox4 High flw NC 50% and 45L. Telemetry monitoring in place, shows sinus dysrhythmia. Urine output adequate. 1800mL fluid restriction in place. Will report to oncoming RN.

## 2023-01-15 NOTE — PROGRESS NOTES
RT Note:     Scheduled Q4w/a DuoNeb given. Pt on HFNC on 50% 45L. Stable vitals when resting in bed. Decreased SpO2 with activity. Pt A&O and able to make needs known. BiPAP discontinued and removed from room. Will continue to monitor and assess pt.

## 2023-01-15 NOTE — PLAN OF CARE
Problem: Gas Exchange Impaired  Goal: Optimal Gas Exchange  Outcome: Progressing     Problem: Heart Failure  Goal: Stable Heart Rate and Rhythm  Outcome: Progressing  Goal: Optimal Functional Ability  Intervention: Optimize Functional Ability  Recent Flowsheet Documentation  Taken 1/15/2023 1145 by Renetta Millan RN  Activity Management:   up to bedside commode   up in chair  Taken 1/15/2023 0828 by Renetta Millan RN  Activity Management:   up to bedside commode   up in chair  Goal: Effective Oxygenation and Ventilation  Intervention: Promote Airway Secretion Clearance  Recent Flowsheet Documentation  Taken 1/15/2023 1145 by Renetta Millan RN  Activity Management:   up to bedside commode   up in chair  Taken 1/15/2023 0828 by Renetta Millan RN  Activity Management:   up to bedside commode   up in chair  Goal: Effective Breathing Pattern During Sleep  Intervention: Monitor and Manage Obstructive Sleep Apnea  Recent Flowsheet Documentation  Taken 1/15/2023 1145 by Renetta Millan RN  Medication Review/Management: medications reviewed  Taken 1/15/2023 0828 by Renetta Millan RN  Medication Review/Management: medications reviewed     Problem: Dysrhythmia  Goal: Normalized Cardiac Rhythm  Outcome: Progressing     Problem: Infection  Goal: Absence of Infection Signs and Symptoms  Outcome: Progressing  Intervention: Prevent or Manage Infection  Recent Flowsheet Documentation  Taken 1/15/2023 1145 by Renetta Millan RN  Isolation Precautions: special precautions maintained  Taken 1/15/2023 0828 by Renetta Millan RN  Isolation Precautions: special precautions maintained   Goal Outcome Evaluation:  Pt remains in Special Isolation for +covid infection; lungs very diminished, still has exertional SOB; was on high flow n/c but RT weaning oxygen ; goal of SpO2 88-92%;  pt remains in sinus with frequent PAC. Pt reminded by MD and RN re: 1800ml fluid limit in 24hr. Continue to monitor

## 2023-01-15 NOTE — PROGRESS NOTES
Pt currently on 4L NC. O2 sats mid 90s. This morning, pt on HFNC 45L/50% FiO2 then titrated to 40L/45% and switch to 5L NC. Pt on 5L NC at home. Pt received Duoneb q4h while awake. BS clear/ diminished. RT will continue to follow.      Luis Manuel Roa, RT

## 2023-01-15 NOTE — PROGRESS NOTES
[facility]    Medicine Progress Note - Hospitalist Service    Date of Admission:  1/10/2023    Assessment & Plan   72 year old female with a past medical history of COPD, HTN, HLD, atrial fibrillation on warfarin, moderate aortic stenosis, history of HFpEF who presented with shortness of breath. In the ER had SVT and treated with adenosine and then cardioversion.    Admitted on 1/10 with acute hypoxic respiratory failure requiring BiPAP, initially with unclear inciting event.  Noted to have a moderate right pleural effusion and underwent therapeutic thoracentesis on 1/11 with 1 L transitive fluid removed.  Initially started on ceftriaxone and azithromycin for possible community-acquired pneumonia.  TTE was done which noted EF 55%, borderline hypokinesis of LV, aortic valve calcifications with moderate aortic stenosis.  It was then felt the patient was fluid overloaded given her symptoms of lower extremity edema and was started on IV diuresis on 1/11.    Patient also noted to be COVID-19 positive on admission; but did report having a COVID-19 infection approximately 2 months prior.  Was started on remdesivir although felt less likely that COVID-19 is contributing to her respiratory status.    Patient had a CT chest done on 1/11 which noted bilateral pleural effusions and interstitial thickening consistent with CHF, and small patchy opacities in right middle lobe possibly representing pneumonia. On 1/12 had increasing O2 requirements, up to 100% FiO2 BiPAP.  Subsequent transfer to ICU for closer monitoring.  O2 requirements improving on 1/13 down to 70% HFNC and 50% the following day.    - Continue to wean O2 as tolerated, on 50% HFNC this AM. Will increase diuresis as barely net negative x24 hours.    Acute hypoxic respiratory failure  Acute on chronic HFpEF with moderate right pleural effusion s/p Thoracentesis  Bilateral Pleural Effusions  Moderate AS  -FiO2 50% this AM, will increase  diuresis (patient has been drinking a fair amount)  -Lasix IV 40 mg BID to TID (home dose 20 mg Daily), may increase to q6H if minimal improvement   - Goal net negative 1-2 L per day  -PT/OT consulted, appreciate recs - goal to discharge home    Less Likely Bacterial PNA  COVID-19 Positive (less likely active infection)  -ID consulted, appreciate recs  -Azithromycin (started 1/10) for possible COPD flare  -Remdesivir (start date 1/11) x5 days for treatment/prophylaxis given positive COVID-19 status    Positive Blood Culture  1 of 4 positive for Staph homnis at 2 days, likely contaminant given no fevers.  - No tx necessary at this time    COPD, possible Mild Exacerbation  With possible mild exacerbation  -Prednisone 40 mg Daily x5 days  -DuoNebs  -Substitute home Symbicort for Diego Kasper     SVT   In the ER. S/p adenosine 12 mg and then cardioversion.  -Telemetry  -Diltiazem 30 q8h to  mg Daily (home dose)  -Monitor electrolytes    Diabetes Mellitus Type 2 without known complications  Home Regimen: Metformin 500 mg BID Last A1c: 5.8% (7/2022)  - Hold oral medication  - Moderate Dose ISS  - Target glucose 140-180    Chronic Problems  Chronic Afib and on coumadin   - Pharm to manage coumadin, appreciate recs  - INR goal 2-3      HLD  - PTA meds      Chronic pain issues   - Continue home Gabapentin  - Holding home opioids       Diet: Fluid restriction 1800 ML FLUID  Combination Diet 2 gm NA Diet    DVT Prophylaxis: Warfarin  Chairez Catheter: Not present  Lines: PRESENT      PICC 01/12/23 Triple Lumen Right Basilic Vascular Access; Medication Drips-Site Assessment: WDL      Cardiac Monitoring: ACTIVE order. Indication: Per MD ICU transfer order  Code Status: Full Code      Clinically Significant Risk Factors           # Hypercalcemia: corrected calcium is >10.1, will monitor as appropriate    # Hypoalbuminemia: Lowest albumin = 2.6 g/dL at 1/14/2023  4:00 AM, will monitor as appropriate                     Disposition Plan     Expected Discharge Date: 2-3 days    Destination: home with family;home with help/services  Discharge Comments: Requiring HFNC; plan to wean O2 and diurese prior to d/c          Louis Dutta MD  Hospitalist Service  [facility]  Securely message with [employer] (more info)  Text page via [facility] Paging/Directory   ______________________________________________________________________    Interval History   No acute events overnight.    Patient seen and evaluated at bedside.  Feeling relatively well today, not much shortness of breath.  Denies chest pain or abdominal pain.  States she has been trying to go to the commode as much as she can which I encouraged her to keep doing to stay condition.    Physical Exam   Vital Signs: Temp: 98.5  F (36.9  C) Temp src: Oral BP: 105/59 Pulse: 76   Resp: 18 SpO2: 93 % O2 Device: High Flow Nasal Cannula (HFNC) Oxygen Delivery: 45 LPM  Weight: 146 lbs 1.6 oz    General: No acute distress, appears comfortable  HEENT: NC/AT, anicteric sclera, external ears normal  Neck: supple  CV: unable to auscultate due to PAPR use, no peripheral edema  Resp: unable to auscultate due to PAPR use, no accessory muscle use or respiratory distress   Abdomen: soft, nontender, no  Skin: warm, dry  Neuro: alert, cranial nerves grossly intact  Psych: normal insight and judgement, appropriate speech    Medical Decision Making       45 MINUTES SPENT BY ME on the date of service doing chart review, history, exam, documentation & further activities per the note.       Data     I have personally reviewed the following data over the past 24 hrs:    8.8  \   8.2 (L)   / 274     141 95 (L) 23 /  108 (H)   3.8 38 (H) 0.59 (L) \       ALT: 26 AST: 13 AP: 71 TBILI: 0.6   ALB: 2.9 (L) TOT PROTEIN: 5.7 (L) LIPASE: N/A       INR:  2.83 (H) PTT:  N/A   D-dimer:  N/A Fibrinogen:  N/A       Imaging results reviewed over the past 24 hrs:   No results found for this or any  previous visit (from the past 24 hour(s)).

## 2023-01-15 NOTE — PROGRESS NOTES
PULMONARY MEDICINE PROGRESS NOTE  1/14/2023    Admit Date: 1/10/2023  CODE: Full Code    Reason for Consult: acute hypoxic respiratory failure    Assessment/Plan:   73 year old female with a history of active tobacco dependence, presumed COPD with no PFTs on record on home oxygen, DM, fibromyalgia, moderate aortic stenosis, HFpEF with nonadherence to low-sodium diet, presented on 1/10 with hypertension, tachycardia, anasarca, found to be hypoxic with pulmonary edema and large bilateral pleural effusions (transudative right pleural fluid), being treated for ADHF and empirically for possible AECOPD and pneumonia, though less likely bacterial pneumonia clinically, on HFNC, found to be covid-positive    Acute hypoxic respiratory failure: Likely primarily ADHF with HFpEF and moderate aortic stenosis. Large bilateral pleural effusions, right thoracentesis with one liter removed, transudative. Patient smokes 6 cigarettes daily. She is interested in trying nicotine nasal spray outpatient; had rash with patches. No PFTs on record; does have some radiographic emphysema, difficult to evaluate extent with superimposed pulmonary edema. Net negative 9600 mL for hospitalization, 2000 mL last 24 hours. Bacterial pneumonia unlikely. COVID-19 positive; up to date with vaccination. Down to 45 L/min 40%, still with 1+ pitting edema. She eats processed/canned food, potato chips, has not been adhering to low-sodium diet at home.    Plan:  - continue diuresis  - brief prednisone burst  - nebulized bronchodilators  - remdesivir  - completing course of azithromycin  - transition to low-flow oxygen today  - no longer needing BiPAP  - adherence to low-sodium diet emphasized; she has not been doing this as outpatient; discussed limiting sodium intake to <2000 mg daily  - tobacco cessation essential; patient interested in trying nicotine nasal spray outpatient; she is not fully committed to quitting smoking  - patient does not want outpatient  "pulmonary function testing  - has home oxygen  - will sign off but please call if needed    Pablito Jha MD  Pulmonary and Critical Care Medicine  [facility]  Vocera  Office [phone]  Pager [phone]  he/him                                                                                                                                                      SUBJECTIVE/INTERVAL HISTORY   Breathing feels better. On 45 L/min 40%, can likely transition to low-flow oxygen.                                                                                                                                                    Exam/Data:     Vitals  /57   Pulse 91   Temp 99  F (37.2  C) (Oral)   Resp 16   Ht 1.651 m (5' 5\")   Wt 66.3 kg (146 lb 1.6 oz)   SpO2 94%   BMI 24.31 kg/m    BP - Mean:  [70-78] 70  I/O last 3 completed shifts:  In: 1300 [P.O.:1300]  Out: 2075 [Urine:2075]  Weight change: -2.313 kg (-5 lb 1.6 oz)  [unfilled]  EXAM:  /57   Pulse 91   Temp 99  F (37.2  C) (Oral)   Resp 16   Ht 1.651 m (5' 5\")   Wt 66.3 kg (146 lb 1.6 oz)   SpO2 94%   BMI 24.31 kg/m      Intake/Output last 3 shifts:  I/O last 3 completed shifts:  In: 1300 [P.O.:1300]  Out: 2075 [Urine:2075]  Intake/Output this shift:  I/O this shift:  In: 300 [P.O.:300]  Out: 800 [Urine:800]    Physical Exam  Gen: alert, oriented, no distress  HEENT: NT, no AUGUST  CV: tachy, regular, no m/g/r  Resp: diminished bilaterally dependent lung fields, no focal crackles  Abd: soft, nontender, BS+  Skin: no rashes or lesions  Ext: 1+ pitting edema at ankles  Neuro: PERRL, nonfocal exam    ROS: A complete 10-system review of systems was obtained and is negative with the exception of what is noted in the history of present illness.    Medications:       - MEDICATION INSTRUCTIONS -         remdesivir  100 mg Intravenous Q24H    And     sodium chloride 0.9%  50 mL Intravenous Q24H     atorvastatin  20 mg Oral At Bedtime "     azithromycin  500 mg Oral Daily     diltiazem ER COATED BEADS  120 mg Oral Daily     fluticasone-vilanterol  1 puff Inhalation Daily     furosemide  40 mg Intravenous TID     gabapentin  600 mg Oral TID     insulin aspart  1-7 Units Subcutaneous TID w/meals     ipratropium - albuterol 0.5 mg/2.5 mg/3 mL  3 mL Nebulization Q4H While awake     Miconazole Nitrate   Topical BID     pantoprazole  40 mg Intravenous Daily with breakfast     sodium chloride (PF)  3 mL Intracatheter Q8H     sucralfate  1 g Oral TID     warfarin ANTICOAGULANT  1.5 mg Oral ONCE at 18:00     warfarin ANTICOAGULANT  1 mg Oral ONCE at 18:00         DATA  All laboratory and radiology has been personally reviewed by myself today.  Recent Labs   Lab 01/15/23  0542   WBC 8.8   HGB 8.2*   HCT 28.7*        Recent Labs   Lab 01/15/23  0542 01/14/23  0400 01/13/23  0452    143 142   CO2 38* 37* 38*   BUN 23 20 19   ALKPHOS 71 68 79   ALT 26 29 35   AST 13 15 16       PFT DATA:  None on record    IMAGING:   CT chest (1/12/23):  - images directly reviewed, formal interpretation follows:  FINDINGS:     LUNGS AND PLEURA: Mild to moderate emphysematous changes in the lungs. Interstitial thickening throughout both lungs. A small region of patchy opacities in the right middle lobe (series 5 image 158). Small to moderate-sized right pleural effusion with   adjacent atelectasis and small left pleural effusion with adjacent atelectasis.     MEDIASTINUM/AXILLAE: Mild cardiomegaly. A few nonspecific borderline enlarged mediastinal lymph nodes.     CORONARY ARTERY CALCIFICATION: Present.     MUSCULOSKELETAL: No acute findings.     UPPER ABDOMEN: Unremarkable.                                                                      IMPRESSION:   1. Interstitial thickening throughout both lungs, likely relating to interstitial pulmonary edema. There are also a small to moderate-sized right pleural effusion and small left pleural effusion.   2. A small  region of patchy opacities in the right middle lobe, likely representing pneumonia.  3. Mild to moderate emphysematous changes in the lungs.

## 2023-01-16 ENCOUNTER — APPOINTMENT (OUTPATIENT)
Dept: OCCUPATIONAL THERAPY | Facility: CLINIC | Age: 73
DRG: 291 | End: 2023-01-16
Payer: COMMERCIAL

## 2023-01-16 ENCOUNTER — APPOINTMENT (OUTPATIENT)
Dept: PHYSICAL THERAPY | Facility: CLINIC | Age: 73
DRG: 291 | End: 2023-01-16
Payer: COMMERCIAL

## 2023-01-16 LAB
ANION GAP SERPL CALCULATED.3IONS-SCNC: 10 MMOL/L (ref 5–18)
BACTERIA BLD CULT: NO GROWTH
BACTERIA PLR CULT: NO GROWTH
BUN SERPL-MCNC: 25 MG/DL (ref 8–28)
CALCIUM SERPL-MCNC: 9 MG/DL (ref 8.5–10.5)
CHLORIDE BLD-SCNC: 97 MMOL/L (ref 98–107)
CO2 SERPL-SCNC: 36 MMOL/L (ref 22–31)
CREAT SERPL-MCNC: 0.62 MG/DL (ref 0.6–1.1)
GFR SERPL CREATININE-BSD FRML MDRD: >90 ML/MIN/1.73M2
GLUCOSE BLD-MCNC: 106 MG/DL (ref 70–125)
GLUCOSE BLDC GLUCOMTR-MCNC: 117 MG/DL (ref 70–99)
GLUCOSE BLDC GLUCOMTR-MCNC: 122 MG/DL (ref 70–99)
GLUCOSE BLDC GLUCOMTR-MCNC: 142 MG/DL (ref 70–99)
GLUCOSE BLDC GLUCOMTR-MCNC: 196 MG/DL (ref 70–99)
GLUCOSE BLDC GLUCOMTR-MCNC: 97 MG/DL (ref 70–99)
GRAM STAIN RESULT: NORMAL
GRAM STAIN RESULT: NORMAL
INR PPP: 1.71 (ref 0.85–1.15)
MAGNESIUM SERPL-MCNC: 1.8 MG/DL (ref 1.8–2.6)
POTASSIUM BLD-SCNC: 3.5 MMOL/L (ref 3.5–5)
SODIUM SERPL-SCNC: 143 MMOL/L (ref 136–145)

## 2023-01-16 PROCEDURE — 999N000157 HC STATISTIC RCP TIME EA 10 MIN

## 2023-01-16 PROCEDURE — 97110 THERAPEUTIC EXERCISES: CPT | Mod: GO

## 2023-01-16 PROCEDURE — 120N000004 HC R&B MS OVERFLOW

## 2023-01-16 PROCEDURE — 94640 AIRWAY INHALATION TREATMENT: CPT

## 2023-01-16 PROCEDURE — 80048 BASIC METABOLIC PNL TOTAL CA: CPT | Performed by: HOSPITALIST

## 2023-01-16 PROCEDURE — 85610 PROTHROMBIN TIME: CPT | Performed by: HOSPITALIST

## 2023-01-16 PROCEDURE — 83735 ASSAY OF MAGNESIUM: CPT | Performed by: HOSPITALIST

## 2023-01-16 PROCEDURE — C9113 INJ PANTOPRAZOLE SODIUM, VIA: HCPCS | Performed by: HOSPITALIST

## 2023-01-16 PROCEDURE — 99233 SBSQ HOSP IP/OBS HIGH 50: CPT | Performed by: HOSPITALIST

## 2023-01-16 PROCEDURE — 250N000013 HC RX MED GY IP 250 OP 250 PS 637: Performed by: HOSPITALIST

## 2023-01-16 PROCEDURE — 97116 GAIT TRAINING THERAPY: CPT | Mod: GP

## 2023-01-16 PROCEDURE — 250N000011 HC RX IP 250 OP 636: Performed by: HOSPITALIST

## 2023-01-16 PROCEDURE — 97535 SELF CARE MNGMENT TRAINING: CPT | Mod: GO

## 2023-01-16 PROCEDURE — 250N000009 HC RX 250: Performed by: HOSPITALIST

## 2023-01-16 RX ORDER — IPRATROPIUM BROMIDE AND ALBUTEROL SULFATE 2.5; .5 MG/3ML; MG/3ML
3 SOLUTION RESPIRATORY (INHALATION) EVERY 4 HOURS PRN
Status: DISCONTINUED | OUTPATIENT
Start: 2023-01-16 | End: 2023-01-19 | Stop reason: HOSPADM

## 2023-01-16 RX ORDER — PANTOPRAZOLE SODIUM 20 MG/1
40 TABLET, DELAYED RELEASE ORAL
Status: DISCONTINUED | OUTPATIENT
Start: 2023-01-17 | End: 2023-01-19 | Stop reason: HOSPADM

## 2023-01-16 RX ORDER — BISACODYL 10 MG
10 SUPPOSITORY, RECTAL RECTAL DAILY PRN
Status: DISCONTINUED | OUTPATIENT
Start: 2023-01-16 | End: 2023-01-19 | Stop reason: HOSPADM

## 2023-01-16 RX ORDER — MAGNESIUM OXIDE 400 MG/1
400 TABLET ORAL EVERY 4 HOURS
Status: COMPLETED | OUTPATIENT
Start: 2023-01-16 | End: 2023-01-16

## 2023-01-16 RX ORDER — WARFARIN SODIUM 2.5 MG/1
2.5 TABLET ORAL
Status: COMPLETED | OUTPATIENT
Start: 2023-01-16 | End: 2023-01-16

## 2023-01-16 RX ORDER — AMOXICILLIN 250 MG
1 CAPSULE ORAL AT BEDTIME
Status: DISCONTINUED | OUTPATIENT
Start: 2023-01-16 | End: 2023-01-19 | Stop reason: HOSPADM

## 2023-01-16 RX ORDER — POTASSIUM CHLORIDE 20MEQ/15ML
20 LIQUID (ML) ORAL ONCE
Status: COMPLETED | OUTPATIENT
Start: 2023-01-16 | End: 2023-01-16

## 2023-01-16 RX ADMIN — PANTOPRAZOLE SODIUM 40 MG: 40 INJECTION, POWDER, FOR SOLUTION INTRAVENOUS at 08:10

## 2023-01-16 RX ADMIN — GABAPENTIN 600 MG: 600 TABLET, FILM COATED ORAL at 21:20

## 2023-01-16 RX ADMIN — WARFARIN SODIUM 2.5 MG: 2.5 TABLET ORAL at 17:35

## 2023-01-16 RX ADMIN — GABAPENTIN 600 MG: 600 TABLET, FILM COATED ORAL at 08:10

## 2023-01-16 RX ADMIN — FUROSEMIDE 40 MG: 10 INJECTION, SOLUTION INTRAMUSCULAR; INTRAVENOUS at 22:21

## 2023-01-16 RX ADMIN — DILTIAZEM HYDROCHLORIDE 120 MG: 120 CAPSULE, COATED, EXTENDED RELEASE ORAL at 08:10

## 2023-01-16 RX ADMIN — MAGNESIUM OXIDE TAB 400 MG (241.3 MG ELEMENTAL MG) 400 MG: 400 (241.3 MG) TAB at 08:10

## 2023-01-16 RX ADMIN — MICONAZOLE NITRATE: 20 OINTMENT TOPICAL at 21:21

## 2023-01-16 RX ADMIN — SENNOSIDES AND DOCUSATE SODIUM 1 TABLET: 50; 8.6 TABLET ORAL at 22:22

## 2023-01-16 RX ADMIN — POTASSIUM CHLORIDE 20 MEQ: 20 SOLUTION ORAL at 08:10

## 2023-01-16 RX ADMIN — FUROSEMIDE 40 MG: 10 INJECTION, SOLUTION INTRAMUSCULAR; INTRAVENOUS at 15:06

## 2023-01-16 RX ADMIN — MICONAZOLE NITRATE: 20 OINTMENT TOPICAL at 08:31

## 2023-01-16 RX ADMIN — IPRATROPIUM BROMIDE AND ALBUTEROL SULFATE 3 ML: 2.5; .5 SOLUTION RESPIRATORY (INHALATION) at 07:17

## 2023-01-16 RX ADMIN — SUCRALFATE 1 G: 1 TABLET ORAL at 08:10

## 2023-01-16 RX ADMIN — SUCRALFATE 1 G: 1 TABLET ORAL at 15:07

## 2023-01-16 RX ADMIN — MAGNESIUM OXIDE TAB 400 MG (241.3 MG ELEMENTAL MG) 400 MG: 400 (241.3 MG) TAB at 12:01

## 2023-01-16 RX ADMIN — FUROSEMIDE 40 MG: 10 INJECTION, SOLUTION INTRAMUSCULAR; INTRAVENOUS at 08:10

## 2023-01-16 RX ADMIN — SUCRALFATE 1 G: 1 TABLET ORAL at 21:20

## 2023-01-16 RX ADMIN — ATORVASTATIN CALCIUM 20 MG: 10 TABLET, FILM COATED ORAL at 21:20

## 2023-01-16 RX ADMIN — GABAPENTIN 600 MG: 600 TABLET, FILM COATED ORAL at 15:07

## 2023-01-16 RX ADMIN — FLUTICASONE FUROATE AND VILANTEROL TRIFENATATE 1 PUFF: 200; 25 POWDER RESPIRATORY (INHALATION) at 08:11

## 2023-01-16 ASSESSMENT — ACTIVITIES OF DAILY LIVING (ADL)
ADLS_ACUITY_SCORE: 39

## 2023-01-16 NOTE — PLAN OF CARE
Patient up in chair this shift. Up SBA ambulated in room. Alert and oriented. Tolerating diet. Voiding spontaneously.  and 142 this shift. BP soft. Otherwise VSS. LS diminished. On 3L NC. Denies chest pain or shortness of breath. Tele SA with PACs. Tobacco cessation highly encouraged. On IV lasix with good out put. Son at bedside visiting part of this shift. Anticipate discharge home tomorrow pending progression. Will monitor and continue plan of care.     Problem: Gas Exchange Impaired  Goal: Optimal Gas Exchange  Outcome: Progressing     Problem: Electrolyte Imbalance  Goal: Electrolyte Imbalance: Plan of Care  Outcome: Progressing     Problem: Infection  Goal: Absence of Infection Signs and Symptoms  Outcome: Progressing     Problem: Dysrhythmia  Goal: Normalized Cardiac Rhythm  Outcome: Progressing

## 2023-01-16 NOTE — PROGRESS NOTES
Pt on 3L NC. O2 sats low to mid 90s. BS clear. Pt received Duoneb this morning and was changed to q4h PRN. No respiratory concern at his time. HFNC on standby.    Luis Manuel Roa, RT

## 2023-01-16 NOTE — PROGRESS NOTES
RT Note:    Pt refused 2000 neb treatment stated she was doing fine. Held 0000 treatment d/t pt being asleep, will give prn. Pt weaned down to 4L NC, HFNC on standby. Pt's WOB seems at baseline. Pt A&O and able to make needs known. Will continue to monitor and assess pt.

## 2023-01-16 NOTE — PLAN OF CARE
Problem: Dysrhythmia  Goal: Normalized Cardiac Rhythm  Outcome: Not Progressing     Problem: Risk for Delirium  Goal: Improved Sleep  Outcome: Progressing     Problem: Infection  Goal: Absence of Infection Signs and Symptoms  Outcome: Progressing  Intervention: Prevent or Manage Infection  Recent Flowsheet Documentation  Taken 1/16/2023 0030 by Talia Kelley RN  Isolation Precautions: special precautions maintained  Taken 1/15/2023 2030 by Talia Kelley RN  Isolation Precautions: special precautions maintained   Goal Outcome Evaluation:    (1522-9059) Shift Events:     VSS, no acute events this shift. Pt. A/Ox4. Follows commands. Lung sounds diminished. 3L NC. Telemetry in place. Urine output adequate. 1800mL fluid restriction. Will report to oncoming RN.

## 2023-01-16 NOTE — PROGRESS NOTES
Care Management Follow Up    Length of Stay (days): 6    Expected Discharge Date: 01/18/2023     Concerns to be Addressed: discharge planning     Patient plan of care discussed at interdisciplinary rounds: Yes    Anticipated Discharge Disposition: DME, Home Care     Anticipated Discharge Services: House[employer], PCA, County Worker  Anticipated Discharge DME: Oxygen, Other (see comment) (Pending clinical progress)    Patient/family educated on Medicare website which has current facility and service quality ratings:  n/a  Education Provided on the Discharge Plan:  yes  Patient/Family in Agreement with the Plan: yes    Referrals Placed by CM/SW:  Yes - [facility] home care (no preference)  Private pay costs discussed: Not applicable    Additional Information:  PETERCM spoke with pt via phone about therapy recommendations; home care. Pt unsure but agreeable to referral being sent (no preference in agency - sent to [facility]). Anticipate home with resumption of PCA services and possible home care (PT OT). Care management to follow with pt later this afternoon or 1/17.  10:19 AM    [facility] home care declined pt due to being at cap with payer. [facility] Following.  2:47 PM    [facility] home care accepted pt for RN PT OT. Anticipate discharge home with said services when medically stable- SW attempted to call into room (no answer). Care management following.  3:49 PM    CYNTHIA Hernandez

## 2023-01-16 NOTE — PROGRESS NOTES
[facility]    Medicine Progress Note - Hospitalist Service    Date of Admission:  1/10/2023    Assessment & Plan   72 year old female with a past medical history of COPD, HTN, HLD, atrial fibrillation on warfarin, moderate aortic stenosis, history of HFpEF who presented with shortness of breath. In the ER had SVT and treated with adenosine and then cardioversion.    Admitted on 1/10 with acute hypoxic respiratory failure requiring BiPAP, initially with unclear inciting event.  Noted to have a moderate right pleural effusion and underwent therapeutic thoracentesis on 1/11 with 1 L transitive fluid removed.  Initially started on ceftriaxone and azithromycin for possible community-acquired pneumonia, and received 5 days of azithromycin and prednisone for possible COPD flare. TTE was done which noted EF 55%, borderline hypokinesis of LV, aortic valve calcifications with moderate aortic stenosis. Felt her symptoms were due to CHF and started on IV diuresis on 1/11. CT chest done on 1/11 revealed bilateral pleural effusions and interstitial thickening consistent with CHF, and small patchy opacities in right middle lobe possibly representing pneumonia. On 1/12 had increasing O2 requirements, up to 100% FiO2 BiPAP and was transferred to the ICU for closer monitoring.  Placed on HFNC on 1/13.    Was also COVID-19 positive on admission (reports having COVID-19 infection approximately 1.5 months prior).  Was started on remdesivir although felt less likely that COVID-19 is contributing to her respiratory status.    With diuresis O2 requirements improved with transition from HFNC to nasal cannula on 1/15.  Downgraded to medical floor on 1/14.    - Continue to wean O2 as tolerated, on 3L O2.  Continue with diuresis.  Plan to transition to oral diuretics hopefully discharge in 2 to 3 days.    Acute hypoxic respiratory failure  Acute on chronic HFpEF with moderate right pleural effusion s/p  Thoracentesis  Bilateral Pleural Effusions  Moderate AS  -On 3 L O2 this AM, diuresing improved yesterday  -Lasix IV 40 mg TID (home dose 20 mg Daily), plan to monitor on oral diuretics prior to discharge   -Goal net negative 1-2 L per day  -PT/OT consulted, appreciate recs - goal to discharge home    COVID-19 Positive (less likely active infection)  Treated with Remdesivir x5 days.  - Airborne precautions    Positive Blood Culture  1 of 4 positive for Staph homnis at 2 days, likely contaminant given no fevers.  - No tx necessary at this time    COPD, possible Mild Exacerbation  With possible mild exacerbation. Finished Prednisone x5 days  -DuoNebs PRN  -Substitute home Symbicort for Diego Kasper  -Will need formal PFT as outpatient     SVT   In the ER. S/p adenosine 12 mg and then cardioversion.  -Diltiazem  mg Daily (home dose)    Diabetes Mellitus Type 2 without known complications  Home Regimen: Metformin 500 mg BID Last A1c: 5.8% (7/2022)  - Hold oral medication  - Moderate Dose ISS  - Target glucose 140-180    Chronic Problems  Chronic Afib and on coumadin   - Pharm to manage coumadin, appreciate recs  - INR goal 2-3      HLD  - PTA meds      Chronic pain issues   - Continue home Gabapentin  - Holding home opioids    Resolved Problems  Less Likely Bacterial PNA: Treated with Ceftriaxone and Azithromycin.       Diet: Fluid restriction 1800 ML FLUID  Combination Diet 2 gm NA Diet    DVT Prophylaxis: Warfarin  Chairez Catheter: Not present  Lines: PRESENT      PICC 01/12/23 Triple Lumen Right Basilic Vascular Access; Medication Drips-Site Assessment: WDL      Cardiac Monitoring: ACTIVE order. Indication: Per MD ICU transfer order  Code Status: Full Code      Clinically Significant Risk Factors              # Hypoalbuminemia: Lowest albumin = 2.6 g/dL at 1/14/2023  4:00 AM, will monitor as appropriate                    Disposition Plan     Expected Discharge Date: 2-3 days    Destination: home with  family;home with help/services  Discharge Comments: wean O2, assess on oral diuretic prior to DC          Louis Dutta MD  Hospitalist Service  [facility]  Securely message with [employer] (more info)  Text page via [employer] Paging/Directory   ______________________________________________________________________    Interval History   No acute events overnight.    Patient seen and evaluated at bedside.  Son was present at bedside.  Spoke to the patient and her son extensively about diagnosis of CHF and what things to watch for including fluid intake, salt intake, daily weight, edema, and what things to do.  They are understanding.    Patient denies any chest pain, shortness of breath, or abdominal pain.    Physical Exam   Vital Signs: Temp: 98.3  F (36.8  C) Temp src: Oral BP: 99/63 Pulse: 74   Resp: 16 SpO2: 91 % O2 Device: Nasal cannula Oxygen Delivery: 3 LPM  Weight: 134 lbs 3.2 oz    General: Sitting in chair, NAD  HEENT: NC/AT, anicteric sclera, external ears normal  Neck: supple  CV: unable to auscultate due to PAPR use, no peripheral edema  Resp: unable to auscultate due to PAPR use, no accessory muscle use or respiratory distress   Abdomen: soft, NT, ND  Skin: warm, dry  Neuro: alert  Psych: normal insight and judgement, appropriate speech    Medical Decision Making       50 MINUTES SPENT BY ME on the date of service doing chart review, history, exam, documentation & further activities per the note.       Data     I have personally reviewed the following data over the past 24 hrs:    N/A  \   N/A   / N/A     143 97 (L) 25 /  142 (H)   3.5 36 (H) 0.62 \       INR:  1.71 (H) PTT:  N/A   D-dimer:  N/A Fibrinogen:  N/A       Imaging results reviewed over the past 24 hrs:   No results found for this or any previous visit (from the past 24 hour(s)).

## 2023-01-17 ENCOUNTER — APPOINTMENT (OUTPATIENT)
Dept: OCCUPATIONAL THERAPY | Facility: CLINIC | Age: 73
DRG: 291 | End: 2023-01-17
Payer: COMMERCIAL

## 2023-01-17 ENCOUNTER — APPOINTMENT (OUTPATIENT)
Dept: PHYSICAL THERAPY | Facility: CLINIC | Age: 73
DRG: 291 | End: 2023-01-17
Payer: COMMERCIAL

## 2023-01-17 ENCOUNTER — PATIENT OUTREACH (OUTPATIENT)
Dept: GERIATRIC MEDICINE | Facility: CLINIC | Age: 73
End: 2023-01-17
Payer: COMMERCIAL

## 2023-01-17 LAB
ANION GAP SERPL CALCULATED.3IONS-SCNC: 9 MMOL/L (ref 5–18)
BUN SERPL-MCNC: 24 MG/DL (ref 8–28)
CALCIUM SERPL-MCNC: 9.3 MG/DL (ref 8.5–10.5)
CHLORIDE BLD-SCNC: 95 MMOL/L (ref 98–107)
CO2 SERPL-SCNC: 39 MMOL/L (ref 22–31)
CREAT SERPL-MCNC: 0.64 MG/DL (ref 0.6–1.1)
GFR SERPL CREATININE-BSD FRML MDRD: >90 ML/MIN/1.73M2
GLUCOSE BLD-MCNC: 89 MG/DL (ref 70–125)
GLUCOSE BLDC GLUCOMTR-MCNC: 101 MG/DL (ref 70–99)
GLUCOSE BLDC GLUCOMTR-MCNC: 115 MG/DL (ref 70–99)
GLUCOSE BLDC GLUCOMTR-MCNC: 222 MG/DL (ref 70–99)
GLUCOSE BLDC GLUCOMTR-MCNC: 97 MG/DL (ref 70–99)
GLUCOSE BLDC GLUCOMTR-MCNC: 99 MG/DL (ref 70–99)
INR PPP: 1.37 (ref 0.85–1.15)
MAGNESIUM SERPL-MCNC: 1.9 MG/DL (ref 1.8–2.6)
PHOSPHATE SERPL-MCNC: 3 MG/DL (ref 2.5–4.5)
POTASSIUM BLD-SCNC: 4 MMOL/L (ref 3.5–5)
SODIUM SERPL-SCNC: 143 MMOL/L (ref 136–145)

## 2023-01-17 PROCEDURE — 85610 PROTHROMBIN TIME: CPT | Performed by: HOSPITALIST

## 2023-01-17 PROCEDURE — 83735 ASSAY OF MAGNESIUM: CPT | Performed by: HOSPITALIST

## 2023-01-17 PROCEDURE — 97110 THERAPEUTIC EXERCISES: CPT | Mod: GO

## 2023-01-17 PROCEDURE — 250N000011 HC RX IP 250 OP 636: Performed by: HOSPITALIST

## 2023-01-17 PROCEDURE — 250N000013 HC RX MED GY IP 250 OP 250 PS 637: Performed by: HOSPITALIST

## 2023-01-17 PROCEDURE — 84100 ASSAY OF PHOSPHORUS: CPT | Performed by: HOSPITALIST

## 2023-01-17 PROCEDURE — 97535 SELF CARE MNGMENT TRAINING: CPT | Mod: GO

## 2023-01-17 PROCEDURE — 99232 SBSQ HOSP IP/OBS MODERATE 35: CPT | Performed by: HOSPITALIST

## 2023-01-17 PROCEDURE — 80048 BASIC METABOLIC PNL TOTAL CA: CPT | Performed by: HOSPITALIST

## 2023-01-17 PROCEDURE — 120N000004 HC R&B MS OVERFLOW

## 2023-01-17 PROCEDURE — 97116 GAIT TRAINING THERAPY: CPT | Mod: GP

## 2023-01-17 RX ORDER — MAGNESIUM SULFATE HEPTAHYDRATE 40 MG/ML
2 INJECTION, SOLUTION INTRAVENOUS ONCE
Status: COMPLETED | OUTPATIENT
Start: 2023-01-17 | End: 2023-01-17

## 2023-01-17 RX ORDER — WARFARIN SODIUM 5 MG/1
5 TABLET ORAL
Status: COMPLETED | OUTPATIENT
Start: 2023-01-17 | End: 2023-01-17

## 2023-01-17 RX ADMIN — FLUTICASONE FUROATE AND VILANTEROL TRIFENATATE 1 PUFF: 200; 25 POWDER RESPIRATORY (INHALATION) at 08:15

## 2023-01-17 RX ADMIN — SUCRALFATE 1 G: 1 TABLET ORAL at 08:15

## 2023-01-17 RX ADMIN — PANTOPRAZOLE SODIUM 40 MG: 20 TABLET, DELAYED RELEASE ORAL at 08:15

## 2023-01-17 RX ADMIN — MAGNESIUM SULFATE HEPTAHYDRATE 2 G: 40 INJECTION, SOLUTION INTRAVENOUS at 06:40

## 2023-01-17 RX ADMIN — MICONAZOLE NITRATE: 20 OINTMENT TOPICAL at 08:23

## 2023-01-17 RX ADMIN — GABAPENTIN 600 MG: 600 TABLET, FILM COATED ORAL at 14:53

## 2023-01-17 RX ADMIN — WARFARIN SODIUM 5 MG: 5 TABLET ORAL at 17:11

## 2023-01-17 RX ADMIN — FUROSEMIDE 40 MG: 10 INJECTION, SOLUTION INTRAMUSCULAR; INTRAVENOUS at 14:53

## 2023-01-17 RX ADMIN — SUCRALFATE 1 G: 1 TABLET ORAL at 14:53

## 2023-01-17 RX ADMIN — SUCRALFATE 1 G: 1 TABLET ORAL at 20:58

## 2023-01-17 RX ADMIN — ATORVASTATIN CALCIUM 20 MG: 10 TABLET, FILM COATED ORAL at 21:00

## 2023-01-17 RX ADMIN — SENNOSIDES AND DOCUSATE SODIUM 1 TABLET: 50; 8.6 TABLET ORAL at 20:58

## 2023-01-17 RX ADMIN — GABAPENTIN 600 MG: 600 TABLET, FILM COATED ORAL at 20:58

## 2023-01-17 RX ADMIN — FUROSEMIDE 40 MG: 10 INJECTION, SOLUTION INTRAMUSCULAR; INTRAVENOUS at 08:15

## 2023-01-17 RX ADMIN — DILTIAZEM HYDROCHLORIDE 120 MG: 120 CAPSULE, COATED, EXTENDED RELEASE ORAL at 08:15

## 2023-01-17 RX ADMIN — FUROSEMIDE 40 MG: 10 INJECTION, SOLUTION INTRAMUSCULAR; INTRAVENOUS at 20:58

## 2023-01-17 RX ADMIN — GABAPENTIN 600 MG: 600 TABLET, FILM COATED ORAL at 08:15

## 2023-01-17 ASSESSMENT — ACTIVITIES OF DAILY LIVING (ADL)
ADLS_ACUITY_SCORE: 39

## 2023-01-17 NOTE — PROGRESS NOTES
SPIRITUAL HEALTH SERVICES Progress Note      Saw pt Mary Kay Tejada per LOS.    Patient/Family Understanding of Illness and Goals of Care - Mary Kay admitted due to covid and has pneumonia.  She shared things are going well and she is recovering and is hopefull to discharge in a couple of days     Distress and Loss - non named     Strengths, Coping, and Resources - Mary Kay is she is well supported by her family    Meaning, Beliefs, and Spirituality - She expressed gratitude for all the prayers that have been offered on her behalf    Plan of Care - She expressed no distress or spiritual needs      Ravindra Daugherty M.Div., [facility]  Staff    [phone]

## 2023-01-17 NOTE — PLAN OF CARE
Pt Alert and Oriented. C/o abdominal discomfort and bloating, MD paged with stool softener scheduled. BP's on the softer side, other VSS with pt on 3L NC. Pt states breathing feels better today during shift. Daughter here during shift visiting. Will continue to monitor and follow plan of care.    Problem: Gas Exchange Impaired  Goal: Optimal Gas Exchange  Outcome: Progressing     Problem: Electrolyte Imbalance  Goal: Electrolyte Imbalance: Plan of Care  Outcome: Progressing   Goal Outcome Evaluation:

## 2023-01-17 NOTE — PROGRESS NOTES
CLINICAL NUTRITION SERVICES    Reviewed nutrition risk factors due to LOS. Pt is tolerating diet, eating well per nursing documentation. No nutrition issues identified at this time. RD will follow via further LOS, unless consulted.

## 2023-01-17 NOTE — PROGRESS NOTES
[facility] Care Coordination Contact    Returned call to member per voice mail. Member still in the hospital and anticipate discharge 1/19/23. She inquired if insurance will cover a bed with elevated head and this is to help with the shortness of breath when lie down on her back. Explained to member that hospital bed is covered by Medicare and she needs to work with PCP on the order.     CC offered to order a bed wedge through waiver for member and member is okay with that.     Order placed with Aisha at [employer].    Task sent to CMS for service agreement.    Samantha Alexandra RN, PHN   [facility]  [phone]

## 2023-01-17 NOTE — SIGNIFICANT EVENT
Note cross cover  Page regarding blood pressure 98/56.  Can hold furosemide if BP persists less than 90/55.  Added parameters.    Also constipated, start Berna-Colace with as needed Dulcolax suppository

## 2023-01-17 NOTE — PLAN OF CARE
Goal Outcome Evaluation:        Problem: Gas Exchange Impaired  Goal: Optimal Gas Exchange  Intervention: Optimize Oxygenation and Ventilation       Problem: Heart Failure  Goal: Optimal Functional Ability  Intervention: Optimize Functional Ability     Patient reports abdominal gas pain and declines medication for relief of symptoms.  Patient on 3L 02 via NC with sats in the mid to upper 90's.  Pt up independent to BSC.  2+ bilateral lower extremity edema.   Vitals stable.  Tele: Sinus dysrhythmia with PAC's.

## 2023-01-17 NOTE — PROGRESS NOTES
[facility]    Medicine Progress Note - Hospitalist Service    Date of Admission:  1/10/2023    Assessment & Plan   72 year old female with a past medical history of COPD, HTN, HLD, atrial fibrillation on warfarin, moderate aortic stenosis, history of HFpEF who presented with shortness of breath. In the ER had SVT and treated with adenosine and then cardioversion.    Admitted on 1/10 with acute hypoxic respiratory failure requiring BiPAP, initially with unclear inciting event.  Noted to have a moderate right pleural effusion and underwent therapeutic thoracentesis on 1/11 with 1 L transitive fluid removed.  Initially started on ceftriaxone and azithromycin for possible community-acquired pneumonia, and received 5 days of azithromycin and prednisone for possible COPD flare. TTE was done which noted EF 55%, borderline hypokinesis of LV, aortic valve calcifications with moderate aortic stenosis. Felt her symptoms were due to CHF and started on IV diuresis on 1/11. CT chest done on 1/11 revealed bilateral pleural effusions and interstitial thickening consistent with CHF, and small patchy opacities in right middle lobe possibly representing pneumonia. On 1/12 had increasing O2 requirements, up to 100% FiO2 BiPAP and was transferred to the ICU for closer monitoring.  Placed on HFNC on 1/13.    Was also COVID-19 positive on admission (reports having COVID-19 infection approximately 1.5 months prior).  Was started on remdesivir although felt less likely that COVID-19 is contributing to her respiratory status.    With diuresis O2 requirements improved with transition from HFNC to nasal cannula on 1/15.  Downgraded to medical floor on 1/14.    - Continue to wean O2 as tolerated, on 1L O2.  Continue with diuresis.  Plan to transition to oral diuretics and hopefully discharge in 2-3 days.    Acute hypoxic respiratory failure  Acute on chronic HFpEF with moderate right pleural effusion s/p  Thoracentesis  Bilateral Pleural Effusions  Moderate AS  -On 3L -> 1L O2 this AM, improving with diuresis  -Lasix IV 40 mg TID (home dose 20 mg Daily), plan to monitor on oral diuretics prior to discharge   -Goal net negative 1-2 L per day  -PT/OT consulted, appreciate recs - goal to discharge home    COVID-19 Positive (less likely active infection)  Treated with Remdesivir x5 days.  - Airborne precautions    Positive Blood Culture  1 of 4 positive for Staph homnis at 2 days, likely contaminant given no fevers.  - No tx necessary at this time    COPD, possible Mild Exacerbation  With possible mild exacerbation. Finished Prednisone x5 days  -DuoNebs PRN  -Substitute home Symbicort for Diego Kasper  -Will need formal PFT and sleep study as outpatient     SVT   In the ER. S/p adenosine 12 mg and then cardioversion.  -Diltiazem  mg Daily (home dose)    Diabetes Mellitus Type 2 without known complications  Home Regimen: Metformin 500 mg BID Last A1c: 5.8% (7/2022)  - Hold oral medication  - Moderate Dose ISS  - Target glucose 140-180    Chronic Problems  Chronic Afib and on coumadin   - Pharm to manage coumadin, appreciate recs  - INR goal 2-3   - Will assess cost of Eliquis for the patient     HLD  - PTA meds      Chronic pain issues   - Continue home Gabapentin  - Holding home opioids    Resolved Problems  Less Likely Bacterial PNA: Treated with Ceftriaxone and Azithromycin.       Diet: Fluid restriction 1800 ML FLUID  Combination Diet 2 gm NA Diet    DVT Prophylaxis: Warfarin  Chairez Catheter: Not present  Lines: PRESENT      PICC 01/12/23 Triple Lumen Right Basilic Vascular Access; Medication Drips-Site Assessment: WDL      Cardiac Monitoring: ACTIVE order. Indication: Per MD ICU transfer order  Code Status: Full Code      Clinically Significant Risk Factors              # Hypoalbuminemia: Lowest albumin = 2.6 g/dL at 1/14/2023  4:00 AM, will monitor as appropriate                    Disposition Plan      Expected Discharge Date: 2-3 days    Destination: home with family;home with help/services  Discharge Comments: wean O2, assess on oral diuretic prior to DC          Louis Dutta MD  Hospitalist Service  [facility]  Securely message with [employer] (more info)  Text page via [employer] Paging/Directory   ______________________________________________________________________    Interval History   No acute events overnight.    Patient seen and evaluated at bedside.  Okay this morning except for feeling fatigued.  She feels that after coming off prednisone she feels a bit tired.  Explained that I would rather hold off on tapering her given that can cause fluid retention.  She says she was on it for some time but it appears that she has not been on it for longer than 3 weeks.  Explained that it does not appear that she has signs of adrenal insufficiency at this time which would be indication.  If she does continue to feel lethargic explained we can consider gradual taper.  Denies chest pain, shortness of breath, or abdominal pain.    Physical Exam   Vital Signs: Temp: 97.8  F (36.6  C) Temp src: Oral BP: 117/57 Pulse: 88   Resp: 18 SpO2: 94 % O2 Device: Nasal cannula Oxygen Delivery: 3 LPM  Weight: 134 lbs 3.2 oz    General: Sitting up in chair, appears comfortable  HEENT: NC/AT, anicteric sclera, external ears normal  Neck: supple  CV: unable to auscultate due to PAPR use, no peripheral edema  Resp: unable to auscultate due to PAPR use, no accessory muscle use or respiratory distress   Abdomen: soft, NT, nondistended  Skin: warm, dry  Neuro: alert  Psych: normal insight and judgement, appropriate speech    Medical Decision Making       35 MINUTES SPENT BY ME on the date of service doing chart review, history, exam, documentation & further activities per the note.       Data     I have personally reviewed the following data over the past 24 hrs:    N/A  \   N/A   / N/A     143 95 (L) 24 /  115  (H)   4.0 39 (H) 0.64 \       INR:  1.37 (H) PTT:  N/A   D-dimer:  N/A Fibrinogen:  N/A       Imaging results reviewed over the past 24 hrs:   No results found for this or any previous visit (from the past 24 hour(s)).

## 2023-01-18 ENCOUNTER — APPOINTMENT (OUTPATIENT)
Dept: OCCUPATIONAL THERAPY | Facility: CLINIC | Age: 73
DRG: 291 | End: 2023-01-18
Payer: COMMERCIAL

## 2023-01-18 LAB
ANION GAP SERPL CALCULATED.3IONS-SCNC: 10 MMOL/L (ref 5–18)
BACTERIA PLR CULT: NORMAL
BUN SERPL-MCNC: 25 MG/DL (ref 8–28)
CALCIUM SERPL-MCNC: 8.9 MG/DL (ref 8.5–10.5)
CHLORIDE BLD-SCNC: 93 MMOL/L (ref 98–107)
CO2 SERPL-SCNC: 36 MMOL/L (ref 22–31)
CREAT SERPL-MCNC: 0.67 MG/DL (ref 0.6–1.1)
GFR SERPL CREATININE-BSD FRML MDRD: >90 ML/MIN/1.73M2
GLUCOSE BLD-MCNC: 115 MG/DL (ref 70–125)
GLUCOSE BLDC GLUCOMTR-MCNC: 107 MG/DL (ref 70–99)
GLUCOSE BLDC GLUCOMTR-MCNC: 108 MG/DL (ref 70–99)
GLUCOSE BLDC GLUCOMTR-MCNC: 179 MG/DL (ref 70–99)
GLUCOSE BLDC GLUCOMTR-MCNC: 189 MG/DL (ref 70–99)
GLUCOSE BLDC GLUCOMTR-MCNC: 198 MG/DL (ref 70–99)
INR PPP: 1.35 (ref 0.85–1.15)
MAGNESIUM SERPL-MCNC: 2 MG/DL (ref 1.8–2.6)
POTASSIUM BLD-SCNC: 3.6 MMOL/L (ref 3.5–5)
SODIUM SERPL-SCNC: 139 MMOL/L (ref 136–145)

## 2023-01-18 PROCEDURE — 120N000004 HC R&B MS OVERFLOW

## 2023-01-18 PROCEDURE — 83735 ASSAY OF MAGNESIUM: CPT | Performed by: HOSPITALIST

## 2023-01-18 PROCEDURE — 99232 SBSQ HOSP IP/OBS MODERATE 35: CPT | Performed by: HOSPITALIST

## 2023-01-18 PROCEDURE — 97535 SELF CARE MNGMENT TRAINING: CPT | Mod: GO

## 2023-01-18 PROCEDURE — 250N000013 HC RX MED GY IP 250 OP 250 PS 637: Performed by: HOSPITALIST

## 2023-01-18 PROCEDURE — 80048 BASIC METABOLIC PNL TOTAL CA: CPT | Performed by: HOSPITALIST

## 2023-01-18 PROCEDURE — 85610 PROTHROMBIN TIME: CPT | Performed by: HOSPITALIST

## 2023-01-18 RX ORDER — POTASSIUM CHLORIDE 1500 MG/1
20 TABLET, EXTENDED RELEASE ORAL ONCE
Status: COMPLETED | OUTPATIENT
Start: 2023-01-18 | End: 2023-01-18

## 2023-01-18 RX ORDER — FUROSEMIDE 40 MG
40 TABLET ORAL
Status: DISCONTINUED | OUTPATIENT
Start: 2023-01-18 | End: 2023-01-19 | Stop reason: HOSPADM

## 2023-01-18 RX ORDER — MAGNESIUM OXIDE 400 MG/1
400 TABLET ORAL EVERY 4 HOURS
Status: COMPLETED | OUTPATIENT
Start: 2023-01-18 | End: 2023-01-18

## 2023-01-18 RX ORDER — POTASSIUM CHLORIDE 1500 MG/1
40 TABLET, EXTENDED RELEASE ORAL DAILY
Status: DISCONTINUED | OUTPATIENT
Start: 2023-01-18 | End: 2023-01-19 | Stop reason: HOSPADM

## 2023-01-18 RX ADMIN — FUROSEMIDE 40 MG: 40 TABLET ORAL at 17:44

## 2023-01-18 RX ADMIN — MICONAZOLE NITRATE: 20 OINTMENT TOPICAL at 20:31

## 2023-01-18 RX ADMIN — GABAPENTIN 600 MG: 600 TABLET, FILM COATED ORAL at 20:21

## 2023-01-18 RX ADMIN — POTASSIUM CHLORIDE 40 MEQ: 1500 TABLET, EXTENDED RELEASE ORAL at 09:22

## 2023-01-18 RX ADMIN — ATORVASTATIN CALCIUM 20 MG: 10 TABLET, FILM COATED ORAL at 20:31

## 2023-01-18 RX ADMIN — APIXABAN 5 MG: 5 TABLET, FILM COATED ORAL at 20:31

## 2023-01-18 RX ADMIN — SENNOSIDES AND DOCUSATE SODIUM 1 TABLET: 50; 8.6 TABLET ORAL at 20:31

## 2023-01-18 RX ADMIN — SUCRALFATE 1 G: 1 TABLET ORAL at 09:22

## 2023-01-18 RX ADMIN — APIXABAN 5 MG: 5 TABLET, FILM COATED ORAL at 12:44

## 2023-01-18 RX ADMIN — POTASSIUM CHLORIDE 20 MEQ: 1500 TABLET, EXTENDED RELEASE ORAL at 06:32

## 2023-01-18 RX ADMIN — MICONAZOLE NITRATE: 20 OINTMENT TOPICAL at 00:57

## 2023-01-18 RX ADMIN — MAGNESIUM OXIDE TAB 400 MG (241.3 MG ELEMENTAL MG) 400 MG: 400 (241.3 MG) TAB at 12:44

## 2023-01-18 RX ADMIN — FUROSEMIDE 40 MG: 40 TABLET ORAL at 09:22

## 2023-01-18 RX ADMIN — FLUTICASONE FUROATE AND VILANTEROL TRIFENATATE 1 PUFF: 200; 25 POWDER RESPIRATORY (INHALATION) at 09:22

## 2023-01-18 RX ADMIN — DILTIAZEM HYDROCHLORIDE 120 MG: 120 CAPSULE, COATED, EXTENDED RELEASE ORAL at 09:22

## 2023-01-18 RX ADMIN — GABAPENTIN 600 MG: 600 TABLET, FILM COATED ORAL at 09:22

## 2023-01-18 RX ADMIN — SUCRALFATE 1 G: 1 TABLET ORAL at 20:21

## 2023-01-18 RX ADMIN — MAGNESIUM OXIDE TAB 400 MG (241.3 MG ELEMENTAL MG) 400 MG: 400 (241.3 MG) TAB at 06:32

## 2023-01-18 RX ADMIN — PANTOPRAZOLE SODIUM 40 MG: 20 TABLET, DELAYED RELEASE ORAL at 06:32

## 2023-01-18 ASSESSMENT — ACTIVITIES OF DAILY LIVING (ADL)
ADLS_ACUITY_SCORE: 35

## 2023-01-18 NOTE — PLAN OF CARE
"  Problem: Plan of Care - These are the overarching goals to be used throughout the patient stay.    Goal: Readiness for Transition of Care  Outcome: Progressing   Goal Outcome Evaluation:  /58 (BP Location: Left arm, Cuff Size: Adult Small)   Pulse 79   Temp 98.3  F (36.8  C) (Oral)   Resp 18   Ht 1.651 m (5' 5\")   Wt 65 kg (143 lb 4.8 oz)   SpO2 100%   BMI 23.85 kg/m    Pt is a/o x4, independent with steady gait. Pt denied any pain, SOB, and numbness/tingling.  BS: 222, 179, 115  Total intake 480ml.  Continue POC                      "

## 2023-01-18 NOTE — PROGRESS NOTES
[facility]    Medicine Progress Note - Hospitalist Service    Date of Admission:  1/10/2023    Assessment & Plan   72 year old female with a past medical history of COPD, HTN, HLD, atrial fibrillation on warfarin, moderate aortic stenosis, history of HFpEF who presented with shortness of breath. In the ER had SVT and treated with adenosine and then cardioversion.    Admitted on 1/10 with acute hypoxic respiratory failure requiring BiPAP, initially with unclear inciting event.  Noted to have a moderate right pleural effusion and underwent therapeutic thoracentesis on 1/11 with 1 L transitive fluid removed.  Initially started on ceftriaxone and azithromycin for possible community-acquired pneumonia, and received 5 days of azithromycin and prednisone for possible COPD flare. TTE was done which noted EF 55%, borderline hypokinesis of LV, aortic valve calcifications with moderate aortic stenosis. Felt her symptoms were due to CHF and started on IV diuresis on 1/11. CT chest done on 1/11 revealed bilateral pleural effusions and interstitial thickening consistent with CHF, and small patchy opacities in right middle lobe possibly representing pneumonia. On 1/12 had increasing O2 requirements, up to 100% FiO2 BiPAP and was transferred to the ICU for closer monitoring.  Placed on HFNC on 1/13.    Was also COVID-19 positive on admission (reports having COVID-19 infection approximately 1.5 months prior).  Was started on remdesivir although felt less likely that COVID-19 is contributing to her respiratory status.    With diuresis O2 requirements improved with transition from HFNC to nasal cannula on 1/15.  Downgraded to medical floor on 1/14.    -Transition to RA this AM. Will ambulate and assess O2. Switch to oral diuretics and monitor for 24 hours to ensure weight does not increase.    Acute hypoxic respiratory failure  Acute on chronic HFpEF with moderate right pleural effusion s/p  Thoracentesis  Bilateral Pleural Effusions  Moderate AS  -Weaned to room air this a.m., goal sat 88-92%  -Weight stable at ~ 143 lbs  -Lasix IV 40 mg TID switch to 40 mg PO BID (home dose 20 mg Daily), monitor on oral diuretics prior to discharge   - Net negative 16L during admission (however weights don't correlate with net I/O)  -KCL 40 mEq Daily started (likely discharge on this)  -PT/OT consulted, appreciate recs - goal to discharge home    COVID-19 Positive (less likely active infection)  Treated with Remdesivir x5 days.  - Airborne precautions    Positive Blood Culture  1 of 4 positive for Staph homnis at 2 days, likely contaminant given no fevers.  - No tx necessary at this time    COPD, possible Mild Exacerbation  With possible mild exacerbation. Finished Prednisone x5 days  -DuoNebs PRN  -Substitute home Symbicort for Diego Kasper  -Will need formal PFT and sleep study as outpatient     SVT   In the ER. S/p adenosine 12 mg and then cardioversion.  -Diltiazem  mg Daily (home dose)    Diabetes Mellitus Type 2 without known complications  Home Regimen: Metformin 500 mg BID Last A1c: 5.8% (7/2022)  - Hold oral medication  - Moderate Dose ISS  - Target glucose 140-180    Chronic Problems  Chronic Afib and on coumadin   - Switch from Warfarin to Eliquis 5 mg BID     HLD  - PTA meds      Chronic pain issues   - Continue home Gabapentin  - Holding home opioids    Resolved Problems  Less Likely Bacterial PNA: Treated with Ceftriaxone and Azithromycin.       Diet: Combination Diet 2 gm NA Diet  Fluid restriction 2000 ML FLUID    DVT Prophylaxis: Warfarin  Chairez Catheter: Not present  Lines: PRESENT      PICC 01/12/23 Triple Lumen Right Basilic Vascular Access; Medication Drips-Site Assessment: WDL      Cardiac Monitoring: None  Code Status: Full Code      Clinically Significant Risk Factors              # Hypoalbuminemia: Lowest albumin = 2.6 g/dL at 1/14/2023  4:00 AM, will monitor as appropriate                     Disposition Plan     Expected Discharge Date: 1/19  Destination: home with family;home with help/services  Discharge Comments: assess on oral diuretic prior to DC          Louis Dutta MD  Hospitalist Service  [facility]  Securely message with [employer] (more info)  Text page via [employer] Paging/Directory   ______________________________________________________________________    Interval History   No acute events overnight.    Patient seen and evaluated at bedside.  She is doing okay this morning, feels like breathing is stable.  No chest pain, shortness of breath, or abdominal pain.  Spoke to patient about switching over to Eliquis, explained the risks and benefits, which she was okay with.  Also talked about on discharge about keeping a track of her weights.    Physical Exam   Vital Signs: Temp: 98.1  F (36.7  C) Temp src: Oral BP: 112/57 Pulse: 83   Resp: 18 SpO2: 99 % O2 Device: None (Room air) Oxygen Delivery: 2 LPM  Weight: 143 lbs 1.6 oz    General: Sitting up in bed, NAD  HEENT: NC/AT, anicteric sclera, external ears normal  Neck: supple  CV: unable to auscultate due to PAPR use, no peripheral edema  Resp: unable to auscultate due to PAPR use, no accessory muscle use or respiratory distress   Abdomen: soft, nontender, nondistended  Skin: warm, dry  Neuro: alert  Psych: normal insight and judgement, appropriate speech    Medical Decision Making       40 MINUTES SPENT BY ME on the date of service doing chart review, history, exam, documentation & further activities per the note.       Data     I have personally reviewed the following data over the past 24 hrs:    N/A  \   N/A   / N/A     139 93 (L) 25 /  108 (H)   3.6 36 (H) 0.67 \       INR:  1.35 (H) PTT:  N/A   D-dimer:  N/A Fibrinogen:  N/A       Imaging results reviewed over the past 24 hrs:   No results found for this or any previous visit (from the past 24 hour(s)).

## 2023-01-18 NOTE — PROGRESS NOTES
Care Management Follow Up    Length of Stay (days): 8    Expected Discharge Date: 01/19/2023     Concerns to be Addressed: discharge planning       Additional Information:  CM been following for discharge planning. Patient will be receive home care when she goes home. PT, OT, SN through [facility]. Family will provide a ride at discharge. Plans for discharge 1/19/23.       Sanam Mike RN

## 2023-01-18 NOTE — PLAN OF CARE
Occupational Therapy Discharge Summary    Reason for therapy discharge:    All goals and outcomes met, no further needs identified.    Progress towards therapy goal(s). See goals on Care Plan in [facility] electronic health record for goal details.  Goals met    Therapy recommendation(s):    No further therapy is recommended.

## 2023-01-19 ENCOUNTER — APPOINTMENT (OUTPATIENT)
Dept: PHYSICAL THERAPY | Facility: CLINIC | Age: 73
DRG: 291 | End: 2023-01-19
Payer: COMMERCIAL

## 2023-01-19 VITALS
SYSTOLIC BLOOD PRESSURE: 120 MMHG | HEART RATE: 85 BPM | HEIGHT: 65 IN | TEMPERATURE: 98.2 F | WEIGHT: 144.9 LBS | BODY MASS INDEX: 24.14 KG/M2 | OXYGEN SATURATION: 88 % | RESPIRATION RATE: 18 BRPM | DIASTOLIC BLOOD PRESSURE: 55 MMHG

## 2023-01-19 LAB
ANION GAP SERPL CALCULATED.3IONS-SCNC: 8 MMOL/L (ref 5–18)
BUN SERPL-MCNC: 25 MG/DL (ref 8–28)
CALCIUM SERPL-MCNC: 8.3 MG/DL (ref 8.5–10.5)
CHLORIDE BLD-SCNC: 97 MMOL/L (ref 98–107)
CO2 SERPL-SCNC: 33 MMOL/L (ref 22–31)
CREAT SERPL-MCNC: 0.62 MG/DL (ref 0.6–1.1)
GFR SERPL CREATININE-BSD FRML MDRD: >90 ML/MIN/1.73M2
GLUCOSE BLD-MCNC: 119 MG/DL (ref 70–125)
GLUCOSE BLDC GLUCOMTR-MCNC: 115 MG/DL (ref 70–99)
GLUCOSE BLDC GLUCOMTR-MCNC: 138 MG/DL (ref 70–99)
GLUCOSE BLDC GLUCOMTR-MCNC: 192 MG/DL (ref 70–99)
MAGNESIUM SERPL-MCNC: 2.1 MG/DL (ref 1.8–2.6)
POTASSIUM BLD-SCNC: 3.9 MMOL/L (ref 3.5–5)
SODIUM SERPL-SCNC: 138 MMOL/L (ref 136–145)

## 2023-01-19 PROCEDURE — 250N000011 HC RX IP 250 OP 636: Performed by: INTERNAL MEDICINE

## 2023-01-19 PROCEDURE — 83735 ASSAY OF MAGNESIUM: CPT | Performed by: HOSPITALIST

## 2023-01-19 PROCEDURE — 250N000013 HC RX MED GY IP 250 OP 250 PS 637: Performed by: HOSPITALIST

## 2023-01-19 PROCEDURE — 97116 GAIT TRAINING THERAPY: CPT | Mod: GP

## 2023-01-19 PROCEDURE — 80048 BASIC METABOLIC PNL TOTAL CA: CPT | Performed by: HOSPITALIST

## 2023-01-19 PROCEDURE — 99239 HOSP IP/OBS DSCHRG MGMT >30: CPT | Performed by: HOSPITALIST

## 2023-01-19 RX ORDER — POTASSIUM CHLORIDE 1500 MG/1
40 TABLET, EXTENDED RELEASE ORAL DAILY
Qty: 60 TABLET | Refills: 1 | Status: ON HOLD | OUTPATIENT
Start: 2023-01-20 | End: 2023-06-05

## 2023-01-19 RX ORDER — FUROSEMIDE 40 MG
40 TABLET ORAL
Qty: 60 TABLET | Refills: 1 | Status: SHIPPED | OUTPATIENT
Start: 2023-01-19 | End: 2023-03-21

## 2023-01-19 RX ADMIN — PANTOPRAZOLE SODIUM 40 MG: 20 TABLET, DELAYED RELEASE ORAL at 08:37

## 2023-01-19 RX ADMIN — SUCRALFATE 1 G: 1 TABLET ORAL at 13:55

## 2023-01-19 RX ADMIN — SUCRALFATE 1 G: 1 TABLET ORAL at 08:37

## 2023-01-19 RX ADMIN — MICONAZOLE NITRATE: 20 OINTMENT TOPICAL at 08:43

## 2023-01-19 RX ADMIN — FUROSEMIDE 40 MG: 40 TABLET ORAL at 15:39

## 2023-01-19 RX ADMIN — FLUTICASONE FUROATE AND VILANTEROL TRIFENATATE 1 PUFF: 200; 25 POWDER RESPIRATORY (INHALATION) at 08:42

## 2023-01-19 RX ADMIN — APIXABAN 5 MG: 5 TABLET, FILM COATED ORAL at 08:37

## 2023-01-19 RX ADMIN — DILTIAZEM HYDROCHLORIDE 120 MG: 120 CAPSULE, COATED, EXTENDED RELEASE ORAL at 08:37

## 2023-01-19 RX ADMIN — FUROSEMIDE 40 MG: 40 TABLET ORAL at 08:37

## 2023-01-19 RX ADMIN — POTASSIUM CHLORIDE 40 MEQ: 1500 TABLET, EXTENDED RELEASE ORAL at 08:37

## 2023-01-19 RX ADMIN — ONDANSETRON 4 MG: 2 INJECTION INTRAMUSCULAR; INTRAVENOUS at 13:55

## 2023-01-19 RX ADMIN — GABAPENTIN 600 MG: 600 TABLET, FILM COATED ORAL at 13:55

## 2023-01-19 RX ADMIN — GABAPENTIN 600 MG: 600 TABLET, FILM COATED ORAL at 08:37

## 2023-01-19 ASSESSMENT — ACTIVITIES OF DAILY LIVING (ADL)
ADLS_ACUITY_SCORE: 35

## 2023-01-19 NOTE — CONSULTS
"Per consult notes, this patient was COVID+ in Nov.  Per 's progress notes 1/18/23,this patient was COVID+ \"1.5 months ago\". These details support using 11/30/2022 as COVID+ result so the 1/11/23 COVID+ result is a test within 90 days of initial COVID+ result.     .Patient Name: Mary Kay Tejada   MRN: [id number]   Admit Date: 1/10/2023    Current Infection Status: COVID-19   Current Isolation Status: Special Precautions     Review of COVID-19 status and required isolation precautions    Refer to Special Precautions Duration and Period of Transmissibility Guideline, in [employer].        Involved parties: Shanthi Galvan, Infection Prevention and Other care team member who entered or is aware of this IP consult. .    Criteria used to make decision: Chart Note Dates: 01/18/2023 by Dr. Dutta attributing current respiratory symptoms to CHF,not COVID.    The involved parties have reviewed this patient's chart per the Special Precautions Duration and Period of Transmissibility guideline and have taken the following action:     DECISION - OPTION 1: Patient meets all the criteria for Special Precautions isolation discontinuation and this writer will resolve the COVID-19 infection flag and isolation status, due to: Immunocompetent Symptomatic: Mild or Moderate: 10 days since symptoms began AND greater than or equal to 24hrs since last fever (without fever-reducing meds) AND COVID-19 symptoms have substantially improved. .       Shanthi Galvan, Infection Prevention .1/19/2023  .9:46 AM  [phone]        "

## 2023-01-19 NOTE — DISCHARGE SUMMARY
[facility]  Hospitalist Discharge Summary      Date of Admission:  1/10/2023  Date of Discharge:  1/19/2023  Discharging Provider: Louis Dutta MD  Discharge Service: Hospitalist Service    Discharge Diagnoses   Acute hypoxic respiratory failure  Acute on chronic HFpEF with moderate right pleural effusion s/p Thoracentesis  Bilateral Pleural Effusions  Moderate AS    Follow-ups Needed After Discharge   Follow-up Appointments     Add follow up information to the AVS prior to printing      Follow-up and recommended labs and tests       Follow up with primary care provider, SAVANA SILVER,   within 7 days for hospital follow- up.  The following labs/tests are   recommended: BMP.          Assess weight and volume status, was 144 lbs on discharge  Recommend outpatient PFT and LISA    Unresulted Labs Ordered in the Past 30 Days of this Admission     No orders found from 12/11/2022 to 1/11/2023.      These results will be followed up by PCP    Discharge Disposition   Discharged to home with services  Condition at discharge: Stable    Hospital Course   72 year old female with a past medical history of COPD, HTN, HLD, atrial fibrillation on warfarin, moderate aortic stenosis, history of HFpEF who presented with shortness of breath. In the ER had SVT and treated with adenosine and then cardioversion.    Admitted on 1/10 with acute hypoxic respiratory failure requiring BiPAP, initially with unclear inciting event.  Noted to have a moderate right pleural effusion and underwent therapeutic thoracentesis on 1/11 with 1 L transitive fluid removed.  Initially started on ceftriaxone and azithromycin for possible community-acquired pneumonia, and received 5 days of azithromycin and prednisone for possible COPD flare. TTE was done which noted EF 55%, borderline hypokinesis of LV, aortic valve calcifications with moderate aortic stenosis. Felt her symptoms were due to CHF and started on IV  diuresis on 1/11. CT chest done on 1/11 revealed bilateral pleural effusions and interstitial thickening consistent with CHF, and small patchy opacities in right middle lobe possibly representing pneumonia. On 1/12 had increasing O2 requirements, up to 100% FiO2 BiPAP and was transferred to the ICU for closer monitoring.  Placed on HFNC on 1/13.     Was also COVID-19 positive on admission (reports having COVID-19 infection approximately 1.5 months prior).  Was started on remdesivir although felt less likely that COVID-19 is contributing to her respiratory status.    With diuresis O2 requirements improved with transition from HFNC to nasal cannula on 1/15.  Downgraded to medical floor on 1/14 and continued with diuresis.  On discharge was on 1 L O2 with ambulation and at rest which she was discharged with.  Weight was 144 pounds on discharge.      Acute hypoxic respiratory failure  Acute on chronic HFpEF with moderate right pleural effusion s/p Thoracentesis  Bilateral Pleural Effusions  Moderate AS  -Weight 244 pounds on discharge  -Discharge with 1 L O2 at rest and with exertion, goal O2 sat greater than 88% given likely COPD  -Discharged on Lasix 40 mg twice daily instructed to take an extra dose if needed if weight is uptrending  -KCL 40 mEq Daily started and discharged on this    COVID-19 Positive (less likely active infection)  Treated with Remdesivir x5 days.  - Airborne precautions    Positive Blood Culture  1 of 4 positive for Staph homnis at 2 days, likely contaminant given no fevers.  - No tx necessary at this time    COPD, possible Mild Exacerbation  With possible mild exacerbation. Finished Prednisone x5 days  -DuoNebs PRN  -Continue with Symbicort on discharge  -Will need formal PFT and sleep study as outpatient     SVT   In the ER. S/p adenosine 12 mg and then cardioversion.  -Diltiazem  mg Daily    Diabetes Mellitus Type 2 without known complications  Home Regimen: Metformin 500 mg BID Last A1c:  5.8% (7/2022)  -Continue home meds on discharge    Chronic Problems  Chronic Afib and on coumadin   - Switch from Warfarin to Eliquis 5 mg BID on discharge     HLD  - PTA meds      Chronic pain issues   -Continue home meds on discharge    Resolved Problems  Less Likely Bacterial PNA: Treated with Ceftriaxone and Azithromycin.      Oxygen Documentation  I certify that this patient, Mary Kay Tejada has been under my care (or a nurse practitioner or physican's assistant working with me). This is the face-to-face encounter for oxygen medical necessity.      At the time of this encounter supplemental oxygen is reasonable and necessary and is expected to improve the patient's condition in a home setting.       Patient has continued oxygen desaturation due to COPD J44.9.    If portability is ordered, is the patient mobile within the home? yes      Consultations This Hospital Stay   PHARMACY IP CONSULT  INFECTIOUS DISEASES IP CONSULT  CARE MANAGEMENT / SOCIAL WORK IP CONSULT  INTENSIVIST IP CONSULT  VASCULAR ACCESS ADULT IP CONSULT  PHYSICAL THERAPY ADULT IP CONSULT  OCCUPATIONAL THERAPY ADULT IP CONSULT  PHARMACY IP CONSULT  INFECTION PREVENTION IP CONSULT    Code Status   Full Code    Time Spent on this Encounter   I, Louis Dutta MD, personally saw the patient today and spent greater than 30 minutes discharging this patient.     Louis Dutta MD  [facility] 3 ICU  [address] [facility] [address]  Phone: [phone]  Fax: [phone]  ______________________________________________________________________    Physical Exam   Vital Signs: Temp: 98.2  F (36.8  C) Temp src: Oral BP: 120/55 Pulse: 85   Resp: 18 SpO2: (!) 88 % O2 Device: None (Room air) Oxygen Delivery: 1 LPM  Weight: 144 lbs 14.4 oz    General: NAD, resting comfortably  CV: +S1/S2, no m/r/g, no pitting edema  Respiratory: No respiratory distress accessory muscle use  GI: soft, NT, ND  Neuro: alert, cranial  nerves grossly intact       Primary Care Physician   SAVANA SILVER    Discharge Orders      Home Care Referral      Primary Care - Care Coordination Referral      Reason for your hospital stay    Respiratory failure (needing oxygen) mostly due to heart failure for which you were treated with IV Lasix (water pill).  We also treated you for possible pneumonia with antibiotics and treated you for COVID 19 with antiviral medication.  We were able to get your oxygen requirements down to 1 L at rest which you should continue and 1 L with exertion.  As I mentioned please keep a track of your weight, your weight on discharge is 144 lbs.  When you get home please check your weight on your home scale and write this down.  Use this as your baseline weight and if you notice a weight gain of 2 or 3 pounds above this, and/or increased swelling or difficulty breathing, take an extra dose of the water pill and please call your PCP to notify them that you are gaining weight likely due to heart failure.     Follow-up and recommended labs and tests     Follow up with primary care provider, SAVANA SILVER, within 7 days for hospital follow- up.  The following labs/tests are recommended: BMP.     Activity    Your activity upon discharge: activity as tolerated     Monitor and record    Weigh yourself every morning     When to contact your care team    Contact your care team If symptoms get worse, If increased shortness of breath, If waking up at night with difficulty breathing, If unable to lie down for sleep due to symptoms, If weight gain of 2 pounds a day for 2 days in a row OR 5 pounds in 1 week., Increased swelling in your ankles or legs, and Dizziness or lightheadedness     Discharge Follow Up - with Primary Care clinic within 3-5 days (RN to schedule prior to d/c for HE/[facility] primary care     Add follow up information to the AVS prior to printing     Oxygen Adult/Peds    Oxygen Documentation  I  certify that this patient, Mary Kay Tejada has been under my care (or a nurse practitioner or physican's assistant working with me). This is the face-to-face encounter for oxygen medical necessity.      At the time of this encounter supplemental oxygen is reasonable and necessary and is expected to improve the patient's condition in a home setting.       Patient has continued oxygen desaturation due to COPD J44.9.    If portability is ordered, is the patient mobile within the home? yes     Diet    Follow this diet upon discharge: Fluid restriction 2000 ML FLUID, Combination Diet 2 gm NA Diet       Significant Results and Procedures   Most Recent 3 CBC's:  Recent Labs   Lab Test 01/15/23  0542 01/12/23  0435 01/11/23  1015   WBC 8.8 10.1 5.6   HGB 8.2* 8.2* 8.1*   MCV 76* 80 78    304 286     Most Recent 3 BMP's:  Recent Labs   Lab Test 01/19/23  1204 01/19/23  0740 01/19/23  0438 01/18/23  0758 01/18/23  0522 01/17/23  0730 01/17/23  0445   NA  --   --  138  --  139  --  143   POTASSIUM  --   --  3.9  --  3.6  --  4.0   CHLORIDE  --   --  97*  --  93*  --  95*   CO2  --   --  33*  --  36*  --  39*   BUN  --   --  25  --  25  --  24   CR  --   --  0.62  --  0.67  --  0.64   ANIONGAP  --   --  8  --  10  --  9   JOEY  --   --  8.3*  --  8.9  --  9.3   * 115* 119   < > 115   < > 89    < > = values in this interval not displayed.     Most Recent 2 LFT's:  Recent Labs   Lab Test 01/15/23  0542 01/14/23  0400   AST 13 15   ALT 26 29   ALKPHOS 71 68   BILITOTAL 0.6 0.4     Most Recent 3 INR's:  Recent Labs   Lab Test 01/18/23  0522 01/17/23  0445 01/16/23  0551   INR 1.35* 1.37* 1.71*   ,   Results for orders placed or performed during the hospital encounter of 01/10/23   XR Chest Port 1 View    Narrative    EXAM: XR CHEST PORT 1 VIEW  LOCATION: [facility]  DATE/TIME: 1/10/2023 10:50 PM    INDICATION: dyspnea  COMPARISON: CT chest 03/26/2022      Impression    IMPRESSION: Moderate  size right pleural effusion has developed with diffuse opacification of the right mid and lower lung which may represent a combination of layering pleural fluid and pneumonia or atelectasis. Small left pleural effusion and infiltrate   or atelectasis left base. Advanced degenerative osteoarthritis right shoulder.   US Thoracentesis    Narrative    EXAM:   1. RIGHT THORACENTESIS  2. ULTRASOUND GUIDANCE  LOCATION: [facility]  DATE/TIME: 1/11/2023 8:44 AM    INDICATION: Pleural effusion.    PROCEDURE: Informed consent obtained. Time out performed. The chest was prepped and draped in sterile fashion. 10 mL of 1 % lidocaine was infused into the local soft tissues. Under direct ultrasound guidance, a 5 Telugu catheter system was placed into   the pleural effusion.     1 liters of yellow fluid were removed and sent to lab, if requested.    Patient tolerated procedure well.    Ultrasound imaging was obtained and placed in the patient's permanent medical record.      Impression    IMPRESSION:  Status post right ultrasound-guided thoracentesis.    Reference CPT Code: 40081   CT Chest w/o Contrast    Narrative    EXAM: CT CHEST WITHOUT CONTRAST  LOCATION: [facility]  DATE/TIME: 1/12/2023 1:09 AM    INDICATION: COVID infection. Congestive heart failure.  COMPARISON: 3/26/2022.    TECHNIQUE: CT chest without IV contrast. Multiplanar reformats were obtained. Dose reduction techniques were used.  CONTRAST: None.    FINDINGS:    LUNGS AND PLEURA: Mild to moderate emphysematous changes in the lungs. Interstitial thickening throughout both lungs. A small region of patchy opacities in the right middle lobe (series 5 image 158). Small to moderate-sized right pleural effusion with   adjacent atelectasis and small left pleural effusion with adjacent atelectasis.    MEDIASTINUM/AXILLAE: Mild cardiomegaly. A few nonspecific borderline enlarged mediastinal lymph nodes.    CORONARY ARTERY  CALCIFICATION: Present.    MUSCULOSKELETAL: No acute findings.    UPPER ABDOMEN: Unremarkable.      Impression    IMPRESSION:   1. Interstitial thickening throughout both lungs, likely relating to interstitial pulmonary edema. There are also a small to moderate-sized right pleural effusion and small left pleural effusion.   2. A small region of patchy opacities in the right middle lobe, likely representing pneumonia.  3. Mild to moderate emphysematous changes in the lungs.   -Cardioversion External    Narrative    Fabiana Guerrero MD     1/11/2023 12:31 AM  [facility]    -Cardioversion External    Date/Time: 1/10/2023 10:20 PM  Performed by: Fabiana Guerrero MD  Authorized by: Fabiana Guerrero MD     Risks, benefits and alternatives discussed.    ED EVALUATION:      Assessment Time: 1/10/2023 10:00 PM      I have performed an Emergency Department Evaluation including taking a   history and physical examination, this evaluation will be documented in   the electronic medical record for this ED encounter.     Indication: SVT    ASA Class: Class 3- Severe systemic disease, definite functional   limitations    Mallampati: Grade 2- soft palate, base of uvula, tonsillar pillars, and   portion of posterior pharyngeal wall visible    NPO Status: not NPO, emergent situation    UNIVERSAL PROTOCOL   Site Marked: NA  Prior Images Obtained and Reviewed:  NA  Required items: Required blood products, implants, devices and special   equipment available    Patient identity confirmed:  Verbally with patient and arm band  Patient was reevaluated immediately before administering moderate or deep   sedation or anesthesia  Confirmation Checklist:  Patient's identity using two indicators, relevant   allergies, procedure was appropriate and matched the consent or emergent   situation and correct equipment/implants were available  Time out: Immediately prior to the procedure a time out was called    Universal Protocol:  the Joint Commission Universal Protocol was followed    Preparation: Patient was prepped and draped in usual sterile fashion      SEDATION  Patient Sedated: Yes    Sedation Type:  Moderate (conscious) sedation  Sedation:  Etomidate  Vital signs: Vital signs monitored during sedation      PRE-PROCEDURE DETAILS:     Electrode placement:  Anterior-posterior  Attempt one:     Cardioversion mode:  Synchronous    Waveform:  Biphasic    Shock (Joules):  200    Shock outcome:  Conversion to normal sinus rhythm  Post-procedure details:     Patient status:  Awake      PROCEDURE    Patient Tolerance:  Patient tolerated the procedure well with no immediate   complications  Length of time physician/provider present for 1:1 monitoring during   sedation: 20   Echocardiogram Complete     Value    LVEF  50-55% (borderline)    Narrative    [id number]  [id number]  [id number]  [id number]^SHIRA^[place] [address]     Name: ALVAREZ HINDS  MRN: [id number]  : 1950  Study Date: 2023 08:44 AM  Age: 72 yrs  Gender: Female  Patient [facility]: [facility]  Reason For Study: CHF, Aortic Valve Disorder  Ordering Physician: CAYDEN SILVA  Performed By: GRICELDA     BSA: 1.8 m2  Height: 65 in  Weight: 150 lb  HR: 98  ______________________________________________________________________________  Procedure  Complete Portable Echo Adult. Definity (NDC #67884-207) given intravenously.  Technically difficult study.Extremely difficult acoustic windows despite the  use of contrast for endcardial border definition.  ______________________________________________________________________________  Interpretation Summary     Technically difficult study.Extremely difficult acoustic windows despite the  use of contrast for endcardial border definition.     1.The left ventricle is normal in size. Left ventricular function is  decreased. The ejection fraction is 50-55% (borderline). There is  borderline  global hypokinesia of the left ventricle.  2. The right ventricle is mildly dilated. Mildly decreased right ventricular  systolic function  3. Severe aortic valve calcification is present. Moderate valvular aortic  stenosis.  4. The left atrium is moderately dilated.  ______________________________________________________________________________  Left Ventricle  The left ventricle is normal in size. Left ventricular function is decreased.  The ejection fraction is 50-55% (borderline). A sigmoid septum is present.  Diastolic function not assessed due to atrial fibrillation. There is  borderline global hypokinesia of the left ventricle.     Right Ventricle  The right ventricle is mildly dilated. Mildly decreased right ventricular  systolic function.     Atria  The left atrium is moderately dilated. Right atrium not well visualized. The  right atrium is moderately dilated. There is no color Doppler evidence of an  atrial shunt.     Mitral Valve  There is mild mitral annular calcification. Mitral valve leaflets appear  normal. There is trace to mild mitral regurgitation. There is no mitral valve  stenosis.     Tricuspid Valve  The tricuspid valve is not well visualized. Right ventricular systolic  pressure could not be approximated due to inadequate tricuspid regurgitation.  No tricuspid regurgitation.     Aortic Valve  Severe aortic valve calcification is present. There is trace aortic  regurgitation. Moderate valvular aortic stenosis.     Pulmonic Valve  The pulmonic valve is not well seen, but is grossly normal. This degree of  valvular regurgitation is within normal limits. There is trace pulmonic  valvular regurgitation.     Vessels  The aorta root is normal. Normal size ascending aorta. Moderate  atherosclerotic plaque(s) in the ascending aorta. IVC diameter <2.1 cm  collapsing >50% with sniff suggests a normal RA pressure of 3 mmHg.     Pericardium  There is no pericardial effusion.      ______________________________________________________________________________  MMode/2D Measurements & Calculations  Ao root diam: 3.2 cm  LA dimension: 4.2 cm  LA/Ao: 1.3  LVOT diam: 2.3 cm  LVOT area: 4.2 cm2     LA Volume Indexed (AL/bp): 34.3 ml/m2     Time Measurements  MM HR: 98.0 BPM     Doppler Measurements & Calculations  MV E max raji: 131.3 cm/sec  MV max P.1 mmHg  MV mean P.5 mmHg  MV V2 VTI: 31.2 cm  MVA(VTI): 1.9 cm2  MV dec time: 0.21 sec  Ao V2 max: 293.7 cm/sec  Ao max P.0 mmHg  Ao V2 mean: 247.5 cm/sec  Ao mean P.4 mmHg  Ao V2 VTI: 55.1 cm  ADALBERTO(I,D): 1.1 cm2  ADALBERTO(V,D): 0.98 cm2     LV V1 max P.9 mmHg  LV V1 max: 68.4 cm/sec  LV V1 VTI: 14.2 cm  SV(LVOT): 59.8 ml  SI(LVOT): 34.2 ml/m2  AV Raji Ratio (DI): 0.23  ADALBERTO Index (cm2/m2): 0.62  E/E' av.7  Lateral E/e': 15.9  Medial E/e': 21.6     ______________________________________________________________________________  Report approved by: Diamante Dykes 2023 12:13 PM               Discharge Medications   Current Discharge Medication List      START taking these medications    Details   apixaban ANTICOAGULANT (ELIQUIS) 5 MG tablet Take 1 tablet (5 mg) by mouth 2 times daily  Qty: 60 tablet, Refills: 1    Associated Diagnoses: Atrial fibrillation, unspecified type (H)      potassium chloride ER (KLOR-CON M) 20 MEQ CR tablet Take 2 tablets (40 mEq) by mouth daily  Qty: 60 tablet, Refills: 1    Associated Diagnoses: Acute and chronic respiratory failure with hypercapnia (H)         CONTINUE these medications which have CHANGED    Details   furosemide (LASIX) 40 MG tablet Take 1 tablet (40 mg) by mouth 2 times daily  Qty: 60 tablet, Refills: 1    Associated Diagnoses: Acute and chronic respiratory failure with hypercapnia (H)         CONTINUE these medications which have NOT CHANGED    Details   albuterol (PROAIR HFA/PROVENTIL HFA/VENTOLIN HFA) 108 (90 Base) MCG/ACT inhaler INHALE 2 PUFFS BY MOUTH EVERY 4 HOURS  AS NEEDED FOR WHEEZING  Qty: 87.1 g, Refills: 1    Associated Diagnoses: Chronic obstructive pulmonary disease with acute lower respiratory infection (H)      atorvastatin (LIPITOR) 20 MG tablet TAKE 1 TABLET(20 MG) BY MOUTH AT BEDTIME  Qty: 90 tablet, Refills: 2    Associated Diagnoses: Mixed hyperlipidemia      budesonide-formoterol (SYMBICORT) 160-4.5 MCG/ACT Inhaler Inhale 2 puffs into the lungs 2 times daily  Qty: 10.2 g, Refills: 6    Associated Diagnoses: COPD exacerbation (H)      diltiazem ER COATED BEADS (CARDIZEM CD/CARTIA XT) 120 MG 24 hr capsule TAKE 1 CAPSULE(120 MG) BY MOUTH DAILY  Qty: 90 capsule, Refills: 3    Associated Diagnoses: Atrial flutter, unspecified type (H)      gabapentin (NEURONTIN) 600 MG tablet TAKE 1 TABLET(600 MG) BY MOUTH THREE TIMES DAILY  Qty: 90 tablet, Refills: 4    Associated Diagnoses: Type 2 diabetes mellitus with hypoglycemia without coma, with long-term current use of insulin (H)      ipratropium - albuterol 0.5 mg/2.5 mg/3 mL (DUONEB) 0.5-2.5 (3) MG/3ML neb solution Take 1 vial (3 mLs) by nebulization every 4 hours as needed for shortness of breath / dyspnea or wheezing  Qty: 90 mL, Refills: 3    Associated Diagnoses: COPD exacerbation (H)      meclizine (ANTIVERT) 25 MG tablet TAKE 1 TABLET(25 MG) BY MOUTH THREE TIMES DAILY AS NEEDED FOR DIZZINESS OR NAUSEA  Qty: 45 tablet, Refills: 5    Associated Diagnoses: Vertigo      metFORMIN (GLUCOPHAGE) 500 MG tablet TAKE 1 TABLET(500 MG) BY MOUTH TWICE DAILY WITH MEALS  Qty: 180 tablet, Refills: 1    Associated Diagnoses: Type 2 diabetes mellitus (H)      naloxone (NARCAN) 4 MG/0.1ML nasal spray Spray 1 spray (4 mg) into one nostril alternating nostrils once as needed for opioid reversal every 2-3 minutes until assistance arrives  Qty: 0.2 mL, Refills: 0    Associated Diagnoses: Trigger point of thoracic region; Chronic pain syndrome      omeprazole (PRILOSEC) 20 MG DR capsule Take 20 mg by mouth daily      oxyCODONE IR  "(ROXICODONE) 10 MG tablet Take 1 tablet (10 mg) by mouth every 6 hours as needed for moderate to severe pain  Qty: 120 tablet, Refills: 0    Associated Diagnoses: Fibromyalgia; Chronic pain syndrome      sucralfate (CARAFATE) 1 GM tablet TAKE 1 TABLET BY MOUTH FOUR TIMES DAILY(CRUSH TABLET AND MIX IT WITH A LITTLE WATER, THEN SWALLOW)  Qty: 360 tablet, Refills: 3    Associated Diagnoses: Iron deficiency anemia due to chronic blood loss      ACCU-CHEK SOFTCLIX LANCETS lancets [ACCU-CHEK SOFTCLIX LANCETS LANCETS] TEST THREE TIMES DAILY  Qty: 300 each, Refills: 3    Associated Diagnoses: Type 2 diabetes mellitus with hyperglycemia (H)      BD ULTRA-FINE SHORT PEN NEEDLE 31 gauge x 5/16\" Ndle [BD ULTRA-FINE SHORT PEN NEEDLE 31 GAUGE X 5/16\" NDLE] TEST FOUR TIMES DAILY WITH MEALS AND AT BEDTIME  Qty: 400 each, Refills: 3    Associated Diagnoses: DM (diabetes mellitus) (H)      !! blood-glucose meter (ONETOUCH VERIO IQ METER) Misc [BLOOD-GLUCOSE METER (ONETOUCH VERIO IQ METER) MISC] Check blood sugar three times a day.  Qty: 1 each, Refills: 0    Associated Diagnoses: Type 2 diabetes mellitus with hypoglycemia without coma, without long-term current use of insulin (H)      diaper,brief,adult,disposable (ADULT BRIEFS - LARGE) Misc [DIAPER,BRIEF,ADULT,DISPOSABLE (ADULT BRIEFS - LARGE) MISC] Use 3-4 daily as needed for incontinence  Qty: 120 each, Refills: 6    Associated Diagnoses: Mixed stress and urge urinary incontinence      generic lancets (FINGERSTIX LANCETS) [GENERIC LANCETS (FINGERSTIX LANCETS)] Dispense brand per patient's insurance at pharmacy discretion.  Qty: 300 each, Refills: 0    Comments: Test three times daily.  Associated Diagnoses: Type 2 diabetes mellitus with hyperglycemia, unspecified whether long term insulin use (H)      nystatin (NYSTOP) powder [NYSTATIN (NYSTOP) POWDER] Apply 1 application topically 2 (two) times a day as needed. 2-3 times to affected area(s).  Qty: 60 g, Refills: 3    Associated " "Diagnoses: Candidal intertrigo      !! ONETOUCH VERIO IQ test strip USE 1 EACH AS DIRECTED THREE TIMES DAILY AS NEEDED  Qty: 300 strip, Refills: 1    Associated Diagnoses: Type 2 diabetes mellitus with hypoglycemia without coma, without long-term current use of insulin (H)       !! - Potential duplicate medications found. Please discuss with provider.      STOP taking these medications       warfarin ANTICOAGULANT (COUMADIN) 5 MG tablet Comments:   Reason for Stopping:             Allergies   Allergies   Allergen Reactions     Celebrex [Celecoxib] Rash     patient had butterfly rash - \"lupus-like\"       Latex Rash     "

## 2023-01-19 NOTE — PLAN OF CARE
Problem: Heart Failure  Goal: Fluid and Electrolyte Balance  Outcome: Progressing   Goal Outcome Evaluation:  Pt is a/ox4, independent with steady gait. Pt denied any pain, SOB, and numbness/tingling. Pt informed left tenderness in LLE due to edema. Nurse encouraged pt to elevate while in bed and on recliner. Pt K+ and Mg+ are WDL, redraw in AM of 01/20/23.  LBM 01/19/23.  Right PICC, SL.  Resp: 2L NC, lungs sounds diminished  Continue POC.  Discharge plan to return home on 01/19/23 at 1600hrs,  by son or daughter in law.

## 2023-01-19 NOTE — PROGRESS NOTES
Physical Therapy Discharge Summary    Reason for therapy discharge:    Discharged to home with home therapy.    Progress towards therapy goal(s). See goals on Care Plan in [facility] electronic health record for goal details.  Goals met    Therapy recommendation(s):    Continued therapy is recommended.  Rationale/Recommendations:  TOM FULLER.

## 2023-01-19 NOTE — PROGRESS NOTES
Care Management Discharge Note    Discharge Date: 01/19/2023       Discharge Disposition: DME, Home Care    Discharge Services: Housekeeping/[employer], PCA, County Worker    Discharge DME: Oxygen, Other (see comment) (Pending clinical progress)    Discharge Transportation: family or friend will provide    Private pay costs discussed: Not applicable        Education Provided on the Discharge Plan:  Per team  Persons Notified of Discharge Plans: Patient, home care, nursse, family  Patient/Family in Agreement with the Plan: yes    Handoff Referral Completed: No    Additional Information:  CM been assisting with discharge planning. Plan is for patient to go home with family around 4pm, her son will come get her. She will receive Home Care with SN, PT, OT.  NO other needs identified.        Sanam Mike RN

## 2023-01-19 NOTE — PROGRESS NOTES
Pt discharged at 1600. PICC removed, no s/s of infection. No bleeding noted. VSS. 1L NC O2, O2>88%. AVS and CHF packet reviewed with pt and son prior to discharge. Family brought pt down to ride home via wheelchair.    Rocio Burrell RN on 1/19/2023 at 4:04 PM

## 2023-01-20 ENCOUNTER — TELEPHONE (OUTPATIENT)
Dept: ANTICOAGULATION | Facility: CLINIC | Age: 73
End: 2023-01-20

## 2023-01-20 DIAGNOSIS — I48.91 ATRIAL FIBRILLATION, UNSPECIFIED TYPE (H): Primary | ICD-10-CM

## 2023-01-20 NOTE — TELEPHONE ENCOUNTER
ANTICOAGULATION  MANAGEMENT    Mary Kay Tejada is being discharged from the [facility] Anticoagulation Management Program ([facility]).    Reason for discharge: warfarin replaced by alternate therapy, eliquis    Anticoagulation episode resolved and Standing order discontinued    If patient needs warfarin management in the future, please send a new referral    Norberto Turk RN

## 2023-01-23 DIAGNOSIS — D50.0 IRON DEFICIENCY ANEMIA DUE TO CHRONIC BLOOD LOSS: ICD-10-CM

## 2023-01-23 RX ORDER — WARFARIN SODIUM 5 MG/1
TABLET ORAL
Qty: 90 TABLET | Refills: 0 | OUTPATIENT
Start: 2023-01-23

## 2023-01-23 NOTE — TELEPHONE ENCOUNTER
ANTICOAGULATION MANAGEMENT:  Medication Refill        Visit with referring provider/group within last year: Yes    ACC referral signed within last year: No.    Discharged from [facility] 1/20/23. Now taking Eliquis. Refill denied.    Zahira Fang RN  Anticoagulation Clinic

## 2023-01-24 RX ORDER — SUCRALFATE 1 G/1
TABLET ORAL
Qty: 360 TABLET | Refills: 3 | Status: SHIPPED | OUTPATIENT
Start: 2023-01-24 | End: 2024-01-19

## 2023-01-27 ENCOUNTER — TELEPHONE (OUTPATIENT)
Dept: FAMILY MEDICINE | Facility: CLINIC | Age: 73
End: 2023-01-27
Payer: COMMERCIAL

## 2023-01-27 DIAGNOSIS — J44.1 COPD EXACERBATION (H): ICD-10-CM

## 2023-01-27 NOTE — TELEPHONE ENCOUNTER
General Call      Reason for Call:  Medication request    What are your questions or concerns:  Pt called in she stated that she was prescribed breo ellipta 200 mg-25 mg powder while she was in the hospital and they didn't give her any refills. She takes her last today then she will be out. Pt is requesting a refill on this medication to be sent to her pharmacy. Please look into this request and send over to pharmacy if applicable and call pt back if you have any questions.  PCP not in,addressing to DOD  Thanks!    Date of last appointment with provider: 12/27/22    Could we send this information to you in [place] or would you prefer to receive a phone call?:   Patient would prefer a phone call   Okay to leave a detailed message?: Yes at Home number on file [phone] (home)

## 2023-01-31 ENCOUNTER — TELEPHONE (OUTPATIENT)
Dept: RESPIRATORY THERAPY | Facility: CLINIC | Age: 73
End: 2023-01-31
Payer: COMMERCIAL

## 2023-01-31 NOTE — TELEPHONE ENCOUNTER
I spoke with Mary Kay. She is feeling well today. She has a follow up appointment Monday with her PCP and is hoping to get switched to the same dry powder as she had prescribed while inpatient as she feels it worked well. I believe she is referring to [facility] Mariscal.  Reviewed COPD Action Plan.  Aamir Ross, RT, TTS, Chronic Pulmonary Disease Specialist

## 2023-02-01 NOTE — TELEPHONE ENCOUNTER
Kirby Chakraborty MD [facility] Care Team [place] Yesterday (2:19 PM)     I do not see any Breo in her medication list, discharge summary from January 10, 2023 hospitalization said  continue with Symbicort, if she is okay we can send the Symbicort and have her follow-up with Dr. pastor

## 2023-02-01 NOTE — TELEPHONE ENCOUNTER
RN called patient to relay Dr. Chakraborty's message below.       Patient stated that she is taking albuterol inhaler and another inhaler twice a day.      Patient wished to take Breo Ellipta 200 mg -25 mg but it was not on discharged list. Patient will talk to Dr. Brizuela more when she sees her next Monday.           Jacquelyn Mena RN  [facility]

## 2023-02-04 ENCOUNTER — HEALTH MAINTENANCE LETTER (OUTPATIENT)
Age: 73
End: 2023-02-04

## 2023-02-06 ENCOUNTER — OFFICE VISIT (OUTPATIENT)
Dept: FAMILY MEDICINE | Facility: CLINIC | Age: 73
End: 2023-02-06
Payer: COMMERCIAL

## 2023-02-06 VITALS
WEIGHT: 144 LBS | BODY MASS INDEX: 23.99 KG/M2 | RESPIRATION RATE: 20 BRPM | HEART RATE: 165 BPM | OXYGEN SATURATION: 97 % | DIASTOLIC BLOOD PRESSURE: 71 MMHG | SYSTOLIC BLOOD PRESSURE: 106 MMHG | HEIGHT: 65 IN

## 2023-02-06 DIAGNOSIS — Z79.899 ENCOUNTER FOR LONG-TERM (CURRENT) USE OF MEDICATIONS: ICD-10-CM

## 2023-02-06 DIAGNOSIS — F17.210 CIGARETTE NICOTINE DEPENDENCE WITHOUT COMPLICATION: ICD-10-CM

## 2023-02-06 DIAGNOSIS — Z12.11 SCREEN FOR COLON CANCER: ICD-10-CM

## 2023-02-06 DIAGNOSIS — J44.1 COPD EXACERBATION (H): ICD-10-CM

## 2023-02-06 DIAGNOSIS — E11.42 DIABETIC POLYNEUROPATHY ASSOCIATED WITH TYPE 2 DIABETES MELLITUS (H): ICD-10-CM

## 2023-02-06 DIAGNOSIS — Z12.31 VISIT FOR SCREENING MAMMOGRAM: ICD-10-CM

## 2023-02-06 DIAGNOSIS — M79.7 FIBROMYALGIA: Primary | ICD-10-CM

## 2023-02-06 DIAGNOSIS — G89.4 CHRONIC PAIN SYNDROME: ICD-10-CM

## 2023-02-06 DIAGNOSIS — M54.6 TRIGGER POINT OF THORACIC REGION: ICD-10-CM

## 2023-02-06 PROCEDURE — 20553 NJX 1/MLT TRIGGER POINTS 3/>: CPT | Performed by: FAMILY MEDICINE

## 2023-02-06 PROCEDURE — 99214 OFFICE O/P EST MOD 30 MIN: CPT | Mod: 25 | Performed by: FAMILY MEDICINE

## 2023-02-06 RX ORDER — OXYCODONE HYDROCHLORIDE 10 MG/1
10 TABLET ORAL EVERY 6 HOURS PRN
Qty: 120 TABLET | Refills: 0 | Status: SHIPPED | OUTPATIENT
Start: 2023-02-06 | End: 2023-03-14

## 2023-02-06 RX ORDER — FLUTICASONE FUROATE AND VILANTEROL 200; 25 UG/1; UG/1
1 POWDER RESPIRATORY (INHALATION) DAILY
Qty: 28 EACH | Refills: 11 | Status: SHIPPED | OUTPATIENT
Start: 2023-02-06 | End: 2023-02-08

## 2023-02-06 NOTE — PROGRESS NOTES
1. Fibromyalgia  2. Chronic pain syndrome  3. Trigger point of thoracic region  This is a 72 yo female patient with fibromyalgia - chronic pain - gets trigger point injections - tolerating okay; see procedure note.  As well, uses occasional Oxycodone therapy for pain control.  Refilled today.    - MSK: Inject Trigger Points, >3  - oxyCODONE IR (ROXICODONE) 10 MG tablet; Take 1 tablet (10 mg) by mouth every 6 hours as needed for moderate to severe pain  Dispense: 120 tablet; Refill: 0  - lidocaine 1 % 10 mL  - MSK: Inject Trigger Points, >3    [facility]    Procedure: MSK: Inject Trigger Points, >3    Date/Time: 2/6/2023 11:48 AM  Performed by: Cammy Bui MD  Authorized by: Cammy Bui MD       UNIVERSAL PROTOCOL   Site Marked: Yes  Prior Images Obtained and Reviewed:  NA  Required items: Required blood products, implants, devices and special equipment available (NA)    Patient identity confirmed:  Verbally with patient  NA - No sedation, light sedation, or local anesthesia  Confirmation Checklist:  Patient's identity using two indicators  Time out: Immediately prior to the procedure a time out was called    Universal Protocol: the Joint Commission Universal Protocol was followed    Preparation: Patient was prepped and draped in usual sterile fashion    ESBL (mL):  0     ANESTHESIA    Anesthesia: Local infiltration  Local Anesthetic:  Lidocaine 1% without epinephrine  Anesthetic Total (mL):  10      SEDATION    Patient Sedated: No      PROCEDURE  Describe Procedure: Trigger points identified - (#6) in bilateral upper thoracic/supraspinatus area - 6 points injected with 1.6 ml each of 1% Lidocaine    Patient Tolerance:  Patient tolerated the procedure well with no immediate complications  Length of time physician/provider present for 1:1 monitoring during sedation: 0    4. COPD exacerbation (H)  Recent hospitalization   I have reviewed available hospital  "records, consults, notes, discharge summary as well as imaging and lab results.  In addition I have reviewed her medication list and made any adjustments as indicated in orders.      Feels better now - less short of breath.  Did have COVID as well.      5. Screen for colon cancer  Due for colon cancer screening - decliens     6. Diabetic polyneuropathy associated with type 2 diabetes mellitus (H)  H/o type II DM - check urine microalbumin   - Albumin Random Urine Quantitative with Creat Ratio; Future    7. Encounter for long-term (current) use of medications  Review of medications     8. Visit for screening mammogram  Due for breast cancer screening - reviewed - declines today     9. Cigarette nicotine dependence without complication  Needs to quit smoking - discussed the importance - will try Nicotine inhalers.    - nicotine (NICOTROL) 10 MG inhaler; Use 1 cartridge as needed for urge to smoke by puffing over course of 20min.  Use 6-16 cart/day; reduce number of cart/day over 6-12 weeks.  Dispense: 168 each; Refill: 1      Carol Muñiz is a 73 year old, presenting for the following health issues:  RECHECK (Trigger Point Injections)      Last oxycodone refill 12/27/2022    Got Breo Ellipta 200/25, 1 puff daily -hasn't had to use other inhalers since using that    Had PICC line in right arm (in hospital) - thought she \"would die\" when they pulled it out     Right knee popped out -       History of Present Illness       Reason for visit:  Trigger Point Injections              Review of Systems   Constitutional: Positive for fatigue. Negative for activity change, chills, fever and unexpected weight change.   HENT: Negative.    Eyes: Negative for visual disturbance.   Respiratory: Positive for shortness of breath (chronic). Negative for cough.    Cardiovascular: Negative for chest pain, palpitations and peripheral edema.   Gastrointestinal: Negative for abdominal pain.   Endocrine: Negative for polydipsia and " "polyuria.   Genitourinary: Negative.    Musculoskeletal: Positive for back pain and myalgias.   Allergic/Immunologic: Negative.    Neurological: Positive for weakness.   Hematological: Bruises/bleeds easily (on anticoagulation therapy (DOAC)).   Psychiatric/Behavioral: Negative.    All other systems reviewed and are negative.           Objective    /71 (BP Location: Right arm, Patient Position: Sitting, Cuff Size: Adult Regular)   Pulse (!) 165   Resp 20   Ht 1.651 m (5' 5\")   Wt 65.3 kg (144 lb)   SpO2 97%   BMI 23.96 kg/m    Body mass index is 23.96 kg/m .  Physical Exam  Vitals reviewed.   Constitutional:       General: She is not in acute distress.     Appearance: Normal appearance.   HENT:      Head: Normocephalic.      Right Ear: Tympanic membrane, ear canal and external ear normal.      Left Ear: Tympanic membrane, ear canal and external ear normal.      Nose: Nose normal.      Mouth/Throat:      Mouth: Mucous membranes are moist.      Pharynx: No posterior oropharyngeal erythema.   Eyes:      Extraocular Movements: Extraocular movements intact.      Conjunctiva/sclera: Conjunctivae normal.      Pupils: Pupils are equal, round, and reactive to light.   Cardiovascular:      Rate and Rhythm: Normal rate and regular rhythm.      Pulses: Normal pulses.      Heart sounds: Normal heart sounds. No murmur heard.  Pulmonary:      Effort: Respiratory distress (very mild) present.      Breath sounds: Normal breath sounds.   Abdominal:      Palpations: Abdomen is soft. There is no mass.      Tenderness: There is no abdominal tenderness. There is no guarding or rebound.   Musculoskeletal:         General: Tenderness (multiple trigger points ) present. No deformity. Normal range of motion.      Cervical back: Normal range of motion and neck supple.   Lymphadenopathy:      Cervical: No cervical adenopathy.   Skin:     General: Skin is warm and dry.   Neurological:      General: No focal deficit present.      " Mental Status: She is alert.   Psychiatric:         Mood and Affect: Mood normal.         Behavior: Behavior normal.              No results found for any visits on 02/06/23.

## 2023-02-07 ENCOUNTER — TELEPHONE (OUTPATIENT)
Dept: FAMILY MEDICINE | Facility: CLINIC | Age: 73
End: 2023-02-07
Payer: COMMERCIAL

## 2023-02-07 DIAGNOSIS — J44.1 COPD EXACERBATION (H): Primary | ICD-10-CM

## 2023-02-07 NOTE — TELEPHONE ENCOUNTER
The pharm is asking for another med to be sent in, the pt's Breo is not covered.     Covered alternatives are:    -Dulera  -Symbicort

## 2023-02-08 ENCOUNTER — APPOINTMENT (OUTPATIENT)
Dept: RADIOLOGY | Facility: CLINIC | Age: 73
End: 2023-02-08
Attending: EMERGENCY MEDICINE
Payer: COMMERCIAL

## 2023-02-08 ENCOUNTER — HOSPITAL ENCOUNTER (OUTPATIENT)
Facility: CLINIC | Age: 73
Setting detail: OBSERVATION
Discharge: HOME | End: 2023-02-09
Attending: EMERGENCY MEDICINE | Admitting: EMERGENCY MEDICINE
Payer: COMMERCIAL

## 2023-02-08 DIAGNOSIS — J96.21 ACUTE AND CHRONIC RESPIRATORY FAILURE WITH HYPOXIA (H): ICD-10-CM

## 2023-02-08 DIAGNOSIS — I48.92 ATRIAL FLUTTER WITH RAPID VENTRICULAR RESPONSE (H): ICD-10-CM

## 2023-02-08 DIAGNOSIS — I47.10 SVT (SUPRAVENTRICULAR TACHYCARDIA) (H): ICD-10-CM

## 2023-02-08 DIAGNOSIS — Z99.81 ON HOME OXYGEN THERAPY: ICD-10-CM

## 2023-02-08 DIAGNOSIS — Z79.01 ANTICOAGULATED BY ANTICOAGULATION TREATMENT: ICD-10-CM

## 2023-02-08 DIAGNOSIS — I48.91 ATRIAL FIBRILLATION, UNSPECIFIED TYPE (H): Primary | ICD-10-CM

## 2023-02-08 DIAGNOSIS — E83.42 HYPOMAGNESEMIA: ICD-10-CM

## 2023-02-08 DIAGNOSIS — I50.31 ACUTE HEART FAILURE WITH PRESERVED EJECTION FRACTION (HFPEF) (H): ICD-10-CM

## 2023-02-08 LAB
ALBUMIN UR-MCNC: NEGATIVE MG/DL
ANION GAP SERPL CALCULATED.3IONS-SCNC: 13 MMOL/L (ref 5–18)
APPEARANCE UR: CLEAR
ATRIAL RATE - MUSE: 124 BPM
ATRIAL RATE - MUSE: 124 BPM
ATRIAL RATE - MUSE: 208 BPM
ATRIAL RATE - MUSE: 86 BPM
BASOPHILS # BLD AUTO: 0.1 10E3/UL (ref 0–0.2)
BASOPHILS NFR BLD AUTO: 1 %
BILIRUB UR QL STRIP: NEGATIVE
BNP SERPL-MCNC: 867 PG/ML (ref 0–130)
BUN SERPL-MCNC: 17 MG/DL (ref 8–28)
CALCIUM SERPL-MCNC: 9.6 MG/DL (ref 8.5–10.5)
CHLORIDE BLD-SCNC: 104 MMOL/L (ref 98–107)
CO2 SERPL-SCNC: 25 MMOL/L (ref 22–31)
COLOR UR AUTO: COLORLESS
CREAT SERPL-MCNC: 0.7 MG/DL (ref 0.6–1.1)
DIASTOLIC BLOOD PRESSURE - MUSE: 46 MMHG
DIASTOLIC BLOOD PRESSURE - MUSE: 56 MMHG
DIASTOLIC BLOOD PRESSURE - MUSE: 62 MMHG
DIASTOLIC BLOOD PRESSURE - MUSE: 77 MMHG
EOSINOPHIL # BLD AUTO: 0.1 10E3/UL (ref 0–0.7)
EOSINOPHIL NFR BLD AUTO: 1 %
ERYTHROCYTE [DISTWIDTH] IN BLOOD BY AUTOMATED COUNT: 20.9 % (ref 10–15)
GFR SERPL CREATININE-BSD FRML MDRD: >90 ML/MIN/1.73M2
GLUCOSE BLD-MCNC: 119 MG/DL (ref 70–125)
GLUCOSE BLDC GLUCOMTR-MCNC: 72 MG/DL (ref 70–99)
GLUCOSE UR STRIP-MCNC: NEGATIVE MG/DL
HCT VFR BLD AUTO: 33.7 % (ref 35–47)
HGB BLD-MCNC: 9.2 G/DL (ref 11.7–15.7)
HGB UR QL STRIP: NEGATIVE
HOLD SPECIMEN: NORMAL
IMM GRANULOCYTES # BLD: 0 10E3/UL
IMM GRANULOCYTES NFR BLD: 0 %
INR PPP: 1.34 (ref 0.85–1.15)
INTERPRETATION ECG - MUSE: NORMAL
KETONES UR STRIP-MCNC: NEGATIVE MG/DL
LACTATE SERPL-SCNC: 1.7 MMOL/L (ref 0.7–2)
LACTATE SERPL-SCNC: 2.5 MMOL/L (ref 0.7–2)
LEUKOCYTE ESTERASE UR QL STRIP: NEGATIVE
LYMPHOCYTES # BLD AUTO: 2.1 10E3/UL (ref 0.8–5.3)
LYMPHOCYTES NFR BLD AUTO: 27 %
MAGNESIUM SERPL-MCNC: 1.5 MG/DL (ref 1.8–2.6)
MCH RBC QN AUTO: 22.2 PG (ref 26.5–33)
MCHC RBC AUTO-ENTMCNC: 27.3 G/DL (ref 31.5–36.5)
MCV RBC AUTO: 81 FL (ref 78–100)
MONOCYTES # BLD AUTO: 0.7 10E3/UL (ref 0–1.3)
MONOCYTES NFR BLD AUTO: 9 %
NEUTROPHILS # BLD AUTO: 4.7 10E3/UL (ref 1.6–8.3)
NEUTROPHILS NFR BLD AUTO: 62 %
NITRATE UR QL: NEGATIVE
NRBC # BLD AUTO: 0 10E3/UL
NRBC BLD AUTO-RTO: 0 /100
P AXIS - MUSE: 67 DEGREES
P AXIS - MUSE: NORMAL DEGREES
PH UR STRIP: 5 [PH] (ref 5–7)
PLATELET # BLD AUTO: 432 10E3/UL (ref 150–450)
POTASSIUM BLD-SCNC: 4.6 MMOL/L (ref 3.5–5)
PR INTERVAL - MUSE: 170 MS
PR INTERVAL - MUSE: NORMAL MS
PROCALCITONIN SERPL-MCNC: 0.03 NG/ML (ref 0–0.49)
QRS DURATION - MUSE: 108 MS
QRS DURATION - MUSE: 92 MS
QRS DURATION - MUSE: 94 MS
QRS DURATION - MUSE: 98 MS
QT - MUSE: 270 MS
QT - MUSE: 294 MS
QT - MUSE: 304 MS
QT - MUSE: 352 MS
QTC - MUSE: 421 MS
QTC - MUSE: 422 MS
QTC - MUSE: 436 MS
QTC - MUSE: 475 MS
R AXIS - MUSE: -76 DEGREES
R AXIS - MUSE: -78 DEGREES
R AXIS - MUSE: -79 DEGREES
R AXIS - MUSE: -80 DEGREES
RBC # BLD AUTO: 4.15 10E6/UL (ref 3.8–5.2)
RBC URINE: <1 /HPF
SODIUM SERPL-SCNC: 142 MMOL/L (ref 136–145)
SP GR UR STRIP: 1.01 (ref 1–1.03)
SQUAMOUS EPITHELIAL: <1 /HPF
SYSTOLIC BLOOD PRESSURE - MUSE: 118 MMHG
SYSTOLIC BLOOD PRESSURE - MUSE: 121 MMHG
SYSTOLIC BLOOD PRESSURE - MUSE: 89 MMHG
SYSTOLIC BLOOD PRESSURE - MUSE: 99 MMHG
T AXIS - MUSE: 90 DEGREES
T AXIS - MUSE: 92 DEGREES
T AXIS - MUSE: 93 DEGREES
T AXIS - MUSE: 94 DEGREES
TROPONIN I SERPL-MCNC: 0.09 NG/ML (ref 0–0.29)
TROPONIN T BLD-MCNC: 0.05 UG/L
TSH SERPL DL<=0.005 MIU/L-ACNC: 1.43 UIU/ML (ref 0.3–5)
UROBILINOGEN UR STRIP-MCNC: <2 MG/DL
VENTRICULAR RATE- MUSE: 124 BPM
VENTRICULAR RATE- MUSE: 124 BPM
VENTRICULAR RATE- MUSE: 186 BPM
VENTRICULAR RATE- MUSE: 86 BPM
WBC # BLD AUTO: 7.7 10E3/UL (ref 4–11)
WBC URINE: <1 /HPF

## 2023-02-08 PROCEDURE — 84443 ASSAY THYROID STIM HORMONE: CPT | Performed by: EMERGENCY MEDICINE

## 2023-02-08 PROCEDURE — 71045 X-RAY EXAM CHEST 1 VIEW: CPT

## 2023-02-08 PROCEDURE — 99152 MOD SED SAME PHYS/QHP 5/>YRS: CPT

## 2023-02-08 PROCEDURE — 99222 1ST HOSP IP/OBS MODERATE 55: CPT | Performed by: EMERGENCY MEDICINE

## 2023-02-08 PROCEDURE — 83605 ASSAY OF LACTIC ACID: CPT | Performed by: EMERGENCY MEDICINE

## 2023-02-08 PROCEDURE — G0378 HOSPITAL OBSERVATION PER HR: HCPCS

## 2023-02-08 PROCEDURE — 96376 TX/PRO/DX INJ SAME DRUG ADON: CPT

## 2023-02-08 PROCEDURE — 99153 MOD SED SAME PHYS/QHP EA: CPT

## 2023-02-08 PROCEDURE — 999N000248 HC STATISTIC IV INSERT WITH US BY RN

## 2023-02-08 PROCEDURE — 258N000003 HC RX IP 258 OP 636: Performed by: EMERGENCY MEDICINE

## 2023-02-08 PROCEDURE — 250N000013 HC RX MED GY IP 250 OP 250 PS 637: Performed by: EMERGENCY MEDICINE

## 2023-02-08 PROCEDURE — 36415 COLL VENOUS BLD VENIPUNCTURE: CPT | Performed by: EMERGENCY MEDICINE

## 2023-02-08 PROCEDURE — 96365 THER/PROPH/DIAG IV INF INIT: CPT | Mod: 59

## 2023-02-08 PROCEDURE — 92960 CARDIOVERSION ELECTRIC EXT: CPT

## 2023-02-08 PROCEDURE — 99285 EMERGENCY DEPT VISIT HI MDM: CPT | Mod: 25

## 2023-02-08 PROCEDURE — 85025 COMPLETE CBC W/AUTO DIFF WBC: CPT | Performed by: EMERGENCY MEDICINE

## 2023-02-08 PROCEDURE — 80048 BASIC METABOLIC PNL TOTAL CA: CPT | Performed by: EMERGENCY MEDICINE

## 2023-02-08 PROCEDURE — 96367 TX/PROPH/DG ADDL SEQ IV INF: CPT | Mod: 59

## 2023-02-08 PROCEDURE — 96375 TX/PRO/DX INJ NEW DRUG ADDON: CPT | Mod: 59

## 2023-02-08 PROCEDURE — 81001 URINALYSIS AUTO W/SCOPE: CPT | Performed by: EMERGENCY MEDICINE

## 2023-02-08 PROCEDURE — 250N000009 HC RX 250: Performed by: EMERGENCY MEDICINE

## 2023-02-08 PROCEDURE — 84484 ASSAY OF TROPONIN QUANT: CPT | Performed by: EMERGENCY MEDICINE

## 2023-02-08 PROCEDURE — 96376 TX/PRO/DX INJ SAME DRUG ADON: CPT | Mod: 59

## 2023-02-08 PROCEDURE — 83880 ASSAY OF NATRIURETIC PEPTIDE: CPT | Performed by: EMERGENCY MEDICINE

## 2023-02-08 PROCEDURE — 99223 1ST HOSP IP/OBS HIGH 75: CPT | Performed by: INTERNAL MEDICINE

## 2023-02-08 PROCEDURE — 99207 PR NO BILLABLE SERVICE THIS VISIT: CPT | Performed by: EMERGENCY MEDICINE

## 2023-02-08 PROCEDURE — 82962 GLUCOSE BLOOD TEST: CPT

## 2023-02-08 PROCEDURE — 85610 PROTHROMBIN TIME: CPT | Performed by: EMERGENCY MEDICINE

## 2023-02-08 PROCEDURE — 83735 ASSAY OF MAGNESIUM: CPT | Performed by: EMERGENCY MEDICINE

## 2023-02-08 PROCEDURE — 250N000011 HC RX IP 250 OP 636: Performed by: EMERGENCY MEDICINE

## 2023-02-08 PROCEDURE — 93005 ELECTROCARDIOGRAM TRACING: CPT | Mod: 76 | Performed by: EMERGENCY MEDICINE

## 2023-02-08 PROCEDURE — 999N000157 HC STATISTIC RCP TIME EA 10 MIN

## 2023-02-08 PROCEDURE — 84145 PROCALCITONIN (PCT): CPT | Performed by: EMERGENCY MEDICINE

## 2023-02-08 RX ORDER — ATORVASTATIN CALCIUM 10 MG/1
20 TABLET, FILM COATED ORAL AT BEDTIME
Status: DISCONTINUED | OUTPATIENT
Start: 2023-02-08 | End: 2023-02-09 | Stop reason: HOSPADM

## 2023-02-08 RX ORDER — DILTIAZEM HYDROCHLORIDE 120 MG/1
120 CAPSULE, COATED, EXTENDED RELEASE ORAL DAILY
Status: DISCONTINUED | OUTPATIENT
Start: 2023-02-08 | End: 2023-02-08

## 2023-02-08 RX ORDER — ADENOSINE 3 MG/ML
6 INJECTION, SOLUTION INTRAVENOUS ONCE
Status: COMPLETED | OUTPATIENT
Start: 2023-02-08 | End: 2023-02-08

## 2023-02-08 RX ORDER — ONDANSETRON 4 MG/1
4 TABLET, ORALLY DISINTEGRATING ORAL EVERY 6 HOURS PRN
Status: DISCONTINUED | OUTPATIENT
Start: 2023-02-08 | End: 2023-02-09 | Stop reason: HOSPADM

## 2023-02-08 RX ORDER — ONDANSETRON 2 MG/ML
4 INJECTION INTRAMUSCULAR; INTRAVENOUS EVERY 6 HOURS PRN
Status: DISCONTINUED | OUTPATIENT
Start: 2023-02-08 | End: 2023-02-09 | Stop reason: HOSPADM

## 2023-02-08 RX ORDER — GABAPENTIN 600 MG/1
600 TABLET ORAL 3 TIMES DAILY
Status: DISCONTINUED | OUTPATIENT
Start: 2023-02-08 | End: 2023-02-09 | Stop reason: HOSPADM

## 2023-02-08 RX ORDER — FUROSEMIDE 40 MG
40 TABLET ORAL
Status: DISCONTINUED | OUTPATIENT
Start: 2023-02-08 | End: 2023-02-09 | Stop reason: HOSPADM

## 2023-02-08 RX ORDER — OXYCODONE HYDROCHLORIDE 5 MG/1
10 TABLET ORAL EVERY 6 HOURS PRN
Status: DISCONTINUED | OUTPATIENT
Start: 2023-02-08 | End: 2023-02-09 | Stop reason: HOSPADM

## 2023-02-08 RX ORDER — ONDANSETRON 2 MG/ML
4 INJECTION INTRAMUSCULAR; INTRAVENOUS ONCE
Status: COMPLETED | OUTPATIENT
Start: 2023-02-08 | End: 2023-02-08

## 2023-02-08 RX ORDER — AMOXICILLIN 250 MG
1 CAPSULE ORAL 2 TIMES DAILY PRN
Status: DISCONTINUED | OUTPATIENT
Start: 2023-02-08 | End: 2023-02-09 | Stop reason: HOSPADM

## 2023-02-08 RX ORDER — PANTOPRAZOLE SODIUM 20 MG/1
40 TABLET, DELAYED RELEASE ORAL DAILY
Status: DISCONTINUED | OUTPATIENT
Start: 2023-02-08 | End: 2023-02-09 | Stop reason: HOSPADM

## 2023-02-08 RX ORDER — FLUTICASONE FUROATE AND VILANTEROL 200; 25 UG/1; UG/1
1 POWDER RESPIRATORY (INHALATION) DAILY
Status: DISCONTINUED | OUTPATIENT
Start: 2023-02-08 | End: 2023-02-09 | Stop reason: HOSPADM

## 2023-02-08 RX ORDER — FLUMAZENIL 0.1 MG/ML
0.2 INJECTION, SOLUTION INTRAVENOUS
Status: ACTIVE | OUTPATIENT
Start: 2023-02-08 | End: 2023-02-08

## 2023-02-08 RX ORDER — MAGNESIUM SULFATE HEPTAHYDRATE 40 MG/ML
2 INJECTION, SOLUTION INTRAVENOUS ONCE
Status: COMPLETED | OUTPATIENT
Start: 2023-02-08 | End: 2023-02-08

## 2023-02-08 RX ORDER — DILTIAZEM HYDROCHLORIDE 180 MG/1
180 CAPSULE, COATED, EXTENDED RELEASE ORAL DAILY
Status: DISCONTINUED | OUTPATIENT
Start: 2023-02-09 | End: 2023-02-09 | Stop reason: HOSPADM

## 2023-02-08 RX ORDER — FUROSEMIDE 10 MG/ML
40 INJECTION INTRAMUSCULAR; INTRAVENOUS EVERY 8 HOURS
Status: DISCONTINUED | OUTPATIENT
Start: 2023-02-08 | End: 2023-02-09

## 2023-02-08 RX ORDER — DILTIAZEM HYDROCHLORIDE 5 MG/ML
15 INJECTION INTRAVENOUS ONCE
Status: COMPLETED | OUTPATIENT
Start: 2023-02-08 | End: 2023-02-08

## 2023-02-08 RX ORDER — SUCRALFATE 1 G/1
1 TABLET ORAL
Status: DISCONTINUED | OUTPATIENT
Start: 2023-02-08 | End: 2023-02-09 | Stop reason: HOSPADM

## 2023-02-08 RX ORDER — AMOXICILLIN 250 MG
2 CAPSULE ORAL 2 TIMES DAILY PRN
Status: DISCONTINUED | OUTPATIENT
Start: 2023-02-08 | End: 2023-02-09 | Stop reason: HOSPADM

## 2023-02-08 RX ORDER — FUROSEMIDE 40 MG
80 TABLET ORAL ONCE
Status: COMPLETED | OUTPATIENT
Start: 2023-02-09 | End: 2023-02-09

## 2023-02-08 RX ORDER — ETOMIDATE 2 MG/ML
0.1 INJECTION INTRAVENOUS ONCE
Status: COMPLETED | OUTPATIENT
Start: 2023-02-08 | End: 2023-02-08

## 2023-02-08 RX ORDER — ADENOSINE 3 MG/ML
12 INJECTION, SOLUTION INTRAVENOUS ONCE
Status: COMPLETED | OUTPATIENT
Start: 2023-02-08 | End: 2023-02-08

## 2023-02-08 RX ADMIN — ADENOSINE 12 MG: 3 INJECTION INTRAVENOUS at 08:28

## 2023-02-08 RX ADMIN — GABAPENTIN 600 MG: 600 TABLET, FILM COATED ORAL at 21:09

## 2023-02-08 RX ADMIN — FUROSEMIDE 40 MG: 10 INJECTION, SOLUTION INTRAVENOUS at 16:00

## 2023-02-08 RX ADMIN — DILTIAZEM HYDROCHLORIDE 120 MG: 120 CAPSULE, COATED, EXTENDED RELEASE ORAL at 14:09

## 2023-02-08 RX ADMIN — APIXABAN 5 MG: 5 TABLET, FILM COATED ORAL at 21:09

## 2023-02-08 RX ADMIN — ONDANSETRON 4 MG: 2 INJECTION INTRAMUSCULAR; INTRAVENOUS at 11:53

## 2023-02-08 RX ADMIN — METFORMIN HYDROCHLORIDE 500 MG: 500 TABLET, FILM COATED ORAL at 18:17

## 2023-02-08 RX ADMIN — SODIUM CHLORIDE 250 ML: 9 INJECTION, SOLUTION INTRAVENOUS at 08:32

## 2023-02-08 RX ADMIN — GABAPENTIN 600 MG: 600 TABLET, FILM COATED ORAL at 14:08

## 2023-02-08 RX ADMIN — ADENOSINE 6 MG: 3 INJECTION INTRAVENOUS at 08:22

## 2023-02-08 RX ADMIN — DILTIAZEM HYDROCHLORIDE 15 MG: 5 INJECTION, SOLUTION INTRAVENOUS at 09:09

## 2023-02-08 RX ADMIN — FLUTICASONE FUROATE AND VILANTEROL TRIFENATATE 1 PUFF: 200; 25 POWDER RESPIRATORY (INHALATION) at 14:07

## 2023-02-08 RX ADMIN — PANTOPRAZOLE SODIUM 40 MG: 20 TABLET, DELAYED RELEASE ORAL at 14:09

## 2023-02-08 RX ADMIN — SUCRALFATE 1 G: 1 TABLET ORAL at 16:01

## 2023-02-08 RX ADMIN — ATORVASTATIN CALCIUM 20 MG: 10 TABLET, FILM COATED ORAL at 21:09

## 2023-02-08 RX ADMIN — MAGNESIUM SULFATE HEPTAHYDRATE 2 G: 40 INJECTION, SOLUTION INTRAVENOUS at 10:39

## 2023-02-08 RX ADMIN — ETOMIDATE 10 MG: 20 INJECTION, SOLUTION INTRAVENOUS at 10:26

## 2023-02-08 RX ADMIN — ONDANSETRON 4 MG: 2 INJECTION INTRAMUSCULAR; INTRAVENOUS at 08:36

## 2023-02-08 RX ADMIN — SUCRALFATE 1 G: 1 TABLET ORAL at 14:10

## 2023-02-08 ASSESSMENT — ACTIVITIES OF DAILY LIVING (ADL)
ADLS_ACUITY_SCORE: 35
DEPENDENT_IADLS:: CLEANING;COOKING;LAUNDRY;SHOPPING;MEAL PREPARATION;MEDICATION MANAGEMENT;TRANSPORTATION
ADLS_ACUITY_SCORE: 37
ADLS_ACUITY_SCORE: 33
ADLS_ACUITY_SCORE: 35
ADLS_ACUITY_SCORE: 33
ADLS_ACUITY_SCORE: 35
ADLS_ACUITY_SCORE: 35
ADLS_ACUITY_SCORE: 37

## 2023-02-08 ASSESSMENT — ENCOUNTER SYMPTOMS
COUGH: 0
LIGHT-HEADEDNESS: 1
CHEST TIGHTNESS: 0
CHILLS: 0
PALPITATIONS: 1
ABDOMINAL PAIN: 0
DIAPHORESIS: 0
FEVER: 0
SHORTNESS OF BREATH: 1

## 2023-02-08 NOTE — H&P
[facility] MEDICINE ADMISSION HISTORY AND PHYSICAL     Assessment & Plan        73 year old into  ER for palpitations. Pt checks her pulse at home.This  Morning she began with short intervals of not  Feeling quite right. This progressed  And sustained. On arrival the heart rate was in the 180's and narrow.    ER team attempted conservative management with cardizem, adenosine though  She become hypotensive.  The decision was made for emergent cardioversion.  After consent was signed she received 10 mg etomidate and 200 joules that  Resulted in cardioversion to sinus rhythm.    Pt feels well on my interview. Denies chest pain. She is in sinus on the bedside monitor    Pt will be admitted for           Problem list:    1. SVT:s/p cardioversion: (a fib with RVR); home meds of cardizem 120,  2.  History of atrial flutter/fibrillation: on eliquis,chart check shows a PVI for a fibrillation in   2012 and history of ablation for atrial flutter though unclear date;   3. HFrEF/  Aortic stenosis:  Echo on 1/10 with ejection fraction of 50-55%;  Moderate   Stenosis with valve area of 1 cm; cardiology consult for dysrhythmia   Though will need outpatient valve clinic; continue lasix today though will   Change to TID dosing; per iv for 24 hours  4. DM2: metformin   5.  Fibromyalgia: oxycodone perhome dosing  6. Tobacco dependence: denies need for patches  7.Dyslipidemia:statins  8.  dvt prevention: on eliquis        Chief Complaint  palpitations     HISTORY     73 year old old female into  ER for palpitations.  Arrival EKG showed a narrow  Complex tachycardia with rates in 180's.  She was stable.The ED team attempted   At chemical control with cardizem and adenosine without success. The blood pressure dropped.  She agreed to electrical cardioversion.  She received a 200 joule treatment with cardioversion  To normal sinus rhythm.     PMHx includes atrial flutter; atrial fibrillation, aortic stenosis  and HFrEF.  The last echo  Was in 1/2023 showing an EF of 50%    Past Medical History     Past Medical History:  No date: Anemia  No date: Aortic stenosis  No date: Atrial fibrillation (H)  No date: Atrial flutter (H)  No date: Benign neoplasm of adenomatous polyp      Comment:  large intestine   No date: Chronic constipation  No date: Chronic pain syndrome  No date: COPD (chronic obstructive pulmonary disease) (H)      Comment:  Oxygen at night   No date: Dependence on supplemental oxygen      Comment:  Oxygen at noc, during the day as needed  No date: Depression  No date: Diabetes mellitus (H)  No date: Dry eye syndrome  No date: Fibromyalgia  No date: Ganglion      Comment:  left wrist  No date: GERD (gastroesophageal reflux disease)  No date: Hyperlipidemia  No date: Hypertension  No date: Hypokalemia  No date: Infective otitis externa, unspecified      Comment:  Created by Conversion   No date: Larynx edema  No date: Lung disease  No date: Malignant neoplasm of vulva (H)      Comment:  Created by Conversion [facility] Annotation: Apr 17 2007  8:24AM - Cammy Bui:  resection                per Dr. Alfonso Mane 9/06;  Replacement Utility updated                for latest IMO load  No date: Medial epicondylitis  No date: Onychomycosis  No date: Osteoarthritis  No date: Peptic ulcer  No date: Polyneuropathy  No date: Vulvar malignant neoplasm (H)     Surgical History     Past Surgical History:   Procedure Laterality Date     BIOPSY BREAST Right      BIOPSY BREAST Right 01/28/2015     BIOPSY BREAST Right 1/28/2015    Procedure: RIGHT BREAST BIOPSY AFTER WIRE LOCALIZATION AT 0940;  Surgeon: Renée Soriano MD;  Location: [facility] [place];  Service:      BIOPSY OF BREAST, INCISIONAL      Description: Incisional Breast Biopsy;  Recorded: 11/13/2007;  Comments: benign     COLONOSCOPY N/A 6/14/2019    Procedure: COLONOSCOPY;  Surgeon: Eduardo Mora MD;  Location: [facility] [place];   Service: Gastroenterology     ESOPHAGOSCOPY, GASTROSCOPY, DUODENOSCOPY (EGD), COMBINED N/A 11/6/2018    Procedure: ESOPHAGOGASTRODUODENOSCOPY;  Surgeon: Lit Fernando MD;  Location: [facility] OR;  Service:      HYSTERECTOMY       JOINT REPLACEMENT Left     TKA     PICC TRIPLE LUMEN PLACEMENT  1/12/2023          TX ABLATE HEART DYSRHYTHM FOCUS      Description: Catheter Ablation Atrial Fibrillation;  Recorded: 07/31/2012;  Comments: 7/24/12 PVI with Dr. Gardiner and nilay to all 5 pulm veins and CTI fl ablation line as well.     ZZC SUPRACERV ABD HYSTERECTOMY      Description: Supracervical Hysterectomy;  Proc Date: 01/01/1985;  Comments: some cervix left!; ovaries intact; done for bleeding     Family History    Reviewed, and   Family History   Problem Relation Age of Onset     Heart Failure Mother      Cancer Other         paternal HX-laryngeal      Alcoholism Sister      No Known Problems Daughter      No Known Problems Maternal Grandmother      No Known Problems Maternal Grandfather      No Known Problems Paternal Grandmother      No Known Problems Paternal Grandfather      No Known Problems Maternal Aunt      No Known Problems Paternal Aunt      Alcoholism Sister      Alcoholism Brother      Alcoholism Father      Cancer Paternal Uncle         Gastric-Alcohol     Cancer Paternal Uncle         gastric-Alcohol     Hereditary Breast and Ovarian Cancer Syndrome No family hx of      Breast Cancer No family hx of      Colon Cancer No family hx of      Endometrial Cancer No family hx of      Ovarian Cancer No family hx of         Social History      Social History     Tobacco Use     Smoking status: Every Day     Packs/day: 0.25     Types: Cigarettes     Smokeless tobacco: Never     Tobacco comments:     seen by TTS inpatient on 3/31/22   Substance Use Topics     Alcohol use: Yes     Comment: Alcoholic Drinks/day: very little     Drug use: No        Allergies     Allergies   Allergen Reactions     Celebrex  "[Celecoxib] Rash     patient had butterfly rash - \"lupus-like\"       Latex Rash     Prior to Admission Medications      Prior to Admission Medications   Prescriptions Last Dose Informant Patient Reported? Taking?   ACCU-CHEK SOFTCLIX LANCETS lancets   No No   Sig: [ACCU-CHEK SOFTCLIX LANCETS LANCETS] TEST THREE TIMES DAILY   BD ULTRA-FINE SHORT PEN NEEDLE 31 gauge x 5/16\" Ndle   No No   Sig: [BD ULTRA-FINE SHORT PEN NEEDLE 31 GAUGE X 5/16\" NDLE] TEST FOUR TIMES DAILY WITH MEALS AND AT BEDTIME   ONETOUCH VERIO IQ test strip   No No   Sig: USE 1 EACH AS DIRECTED THREE TIMES DAILY AS NEEDED   albuterol (PROAIR HFA/PROVENTIL HFA/VENTOLIN HFA) 108 (90 Base) MCG/ACT inhaler 2/7/2023  No Yes   Sig: INHALE 2 PUFFS BY MOUTH EVERY 4 HOURS AS NEEDED FOR WHEEZING   apixaban ANTICOAGULANT (ELIQUIS) 5 MG tablet 2/7/2023  No Yes   Sig: Take 1 tablet (5 mg) by mouth 2 times daily   atorvastatin (LIPITOR) 20 MG tablet 2/7/2023  No Yes   Sig: TAKE 1 TABLET(20 MG) BY MOUTH AT BEDTIME   blood-glucose meter (ONETOUCH VERIO IQ METER) Misc   No No   Sig: [BLOOD-GLUCOSE METER (ONETOUCH VERIO IQ METER) MISC] Check blood sugar three times a day.   budesonide-formoterol (SYMBICORT) 160-4.5 MCG/ACT Inhaler Past Week  No Yes   Sig: Inhale 2 puffs into the lungs 2 times daily   diaper,brief,adult,disposable (ADULT BRIEFS - LARGE) Misc   No No   Sig: [DIAPER,BRIEF,ADULT,DISPOSABLE (ADULT BRIEFS - LARGE) MISC] Use 3-4 daily as needed for incontinence   diltiazem ER COATED BEADS (CARDIZEM CD/CARTIA XT) 120 MG 24 hr capsule 2/7/2023  No Yes   Sig: TAKE 1 CAPSULE(120 MG) BY MOUTH DAILY   furosemide (LASIX) 40 MG tablet 2/7/2023  No Yes   Sig: Take 1 tablet (40 mg) by mouth 2 times daily   gabapentin (NEURONTIN) 600 MG tablet 2/7/2023  No Yes   Sig: TAKE 1 TABLET(600 MG) BY MOUTH THREE TIMES DAILY   generic lancets (FINGERSTIX LANCETS)   No No   Sig: [GENERIC LANCETS (FINGERSTIX LANCETS)] Dispense brand per patient's insurance at pharmacy discretion. "   ipratropium - albuterol 0.5 mg/2.5 mg/3 mL (DUONEB) 0.5-2.5 (3) MG/3ML neb solution More than a month  No Yes   Sig: Take 1 vial (3 mLs) by nebulization every 4 hours as needed for shortness of breath / dyspnea or wheezing   meclizine (ANTIVERT) 25 MG tablet Past Month  No Yes   Sig: TAKE 1 TABLET(25 MG) BY MOUTH THREE TIMES DAILY AS NEEDED FOR DIZZINESS OR NAUSEA   metFORMIN (GLUCOPHAGE) 500 MG tablet 2/7/2023  No Yes   Sig: TAKE 1 TABLET(500 MG) BY MOUTH TWICE DAILY WITH MEALS   naloxone (NARCAN) 4 MG/0.1ML nasal spray Unknown  No Yes   Sig: Spray 1 spray (4 mg) into one nostril alternating nostrils once as needed for opioid reversal every 2-3 minutes until assistance arrives   nicotine (NICOTROL) 10 MG inhaler  at New Rx not started  No No   Sig: Use 1 cartridge as needed for urge to smoke by puffing over course of 20min.  Use 6-16 cart/day; reduce number of cart/day over 6-12 weeks.   nystatin (NYSTOP) powder Past Month  No Yes   Sig: [NYSTATIN (NYSTOP) POWDER] Apply 1 application topically 2 (two) times a day as needed. 2-3 times to affected area(s).   omeprazole (PRILOSEC) 20 MG DR capsule 2/7/2023  Yes Yes   Sig: Take 20 mg by mouth daily   oxyCODONE IR (ROXICODONE) 10 MG tablet 2/7/2023  No Yes   Sig: Take 1 tablet (10 mg) by mouth every 6 hours as needed for moderate to severe pain   potassium chloride ER (KLOR-CON M) 20 MEQ CR tablet 2/7/2023  No Yes   Sig: Take 2 tablets (40 mEq) by mouth daily   sucralfate (CARAFATE) 1 GM tablet 2/7/2023  No Yes   Sig: TAKE 1 TABLET BY MOUTH FOUR TIMES DAILY. CRUSH TABLET AND MIX IT WITH A LITTLE WATER THEN SWALLOW      Facility-Administered Medications: None      Review of Systems     A 12 point comprehensive review of systems was negative except as noted above in HPI.    PHYSICAL EXAMINATION       Vitals      Temp:  [97.8  F (36.6  C)-98.1  F (36.7  C)] 97.8  F (36.6  C)  Pulse:  [] 91  Resp:  [16-31] 23  BP: ()/(49-77) 101/58  SpO2:  [85 %-96 %] 96  %    Examination     GENERAL:  Alert, cooperative; looks exhausted; no distress   HEAD:  Normocephalic, without obvious abnormality, atraumatic   EYES:  PERRL, conjunctiva/corneas clear, no scleral icterus, EOM's intact   NOSE: Nares normal, septum midline, mucosa normal, no drainage   THROAT: Lips, mucosa, and tongue normal; teeth and gums normal, mouth moist   NECK: Supple, symmetrical, trachea midline   BACK:   Symmetric, no curvature, ROM normal   LUNGS:   Clear to auscultation bilaterally, no rales, rhonchi, or wheezing, symmetric chest rise on inhalation, respirations unlabored   CHEST WALL:  No tenderness or deformity   HEART:  Regular rate and rhythm, S1 and S2 normal, no murmur, rub, or gallop    ABDOMEN:   Soft, non-tender, bowel sounds active all four quadrants, no masses, no organomegaly, no rebound or guarding   EXTREMITIES: Extremities normal, atraumatic, no cyanosis or edema    SKIN: Dry to touch, no exanthems in the visualized areas   NEURO: Alert, oriented x 4, moves all four extremities freely, non-focal   PSYCH: Cooperative, behavior is appropriate      Pertinent Lab     Most Recent 3 BMP's:Recent Labs   Lab Test 02/09/23  0441 02/08/23  1816 02/08/23  0759 01/19/23  0740 01/19/23  0438     --  142  --  138   POTASSIUM 3.6  --  4.6  --  3.9   CHLORIDE 101  --  104  --  97*   CO2 31  --  25  --  33*   BUN 16  --  17  --  25   CR 0.68  --  0.70  --  0.62   ANIONGAP 9  --  13  --  8   JOEY 9.1  --  9.6  --  8.3*   GLC 96 72 119   < > 119    < > = values in this interval not displayed.         Pertinent Radiology     Radiology Results:   Recent Results (from the past 24 hour(s))   XR Chest Port 1 View    Narrative    EXAM: XR CHEST PORT 1 VIEW  LOCATION: [facility]  DATE/TIME: 2/8/2023 8:56 AM    INDICATION: palpitations  COMPARISON: None.      Impression    IMPRESSION: Small right pleural effusion with some atelectasis in the right lung base has improved since  01/10/2023. Addition the appearance of the left lung base has improved as well now is clear.     EKG Results: 4 ekg's reviewed, arrival with SVT, number 4 with sinus rhythm, no STT wave elevations    Advance Care Planning        Angela Kaiser MD  [facility] Medicine  [facility]   Phone: #[phone]

## 2023-02-08 NOTE — ED NOTES
Pt doing well after cardioversion. Awake and alert, no pain. Took sip of water without issue. Cardizem stopped earliler as bp went to 88 systolic. MD aware and so Cardioversion was done. Pt said she has not missed any of her Eliquis doses before cardioversion. Pt on 2 liters of oxygen wears one liter at home.

## 2023-02-08 NOTE — ED TRIAGE NOTES
The patient presents to the ED with high heart rate the last couple of days. She reports her son made her come in. The patient has been checking her oxygen with a oximeter and her heartrate was in the 180s. The patient has a history of atrial fibrillation.

## 2023-02-08 NOTE — PROGRESS NOTES
RT attended conscious sedation. Ambu bag, oxygen, suction, oral airway, and ETc02 SB. RT dismissed by RN

## 2023-02-08 NOTE — PHARMACY-ADMISSION MEDICATION HISTORY
Pharmacy Note - Admission Medication History    Pertinent Provider Information: Patient prefers Breo inhaler unfortunately not covered by her insurance, PMD put in new RX for symbicort.     ______________________________________________________________________    Prior To Admission (PTA) med list completed and updated in EMR.       PTA Med List   Medication Sig Last Dose     albuterol (PROAIR HFA/PROVENTIL HFA/VENTOLIN HFA) 108 (90 Base) MCG/ACT inhaler INHALE 2 PUFFS BY MOUTH EVERY 4 HOURS AS NEEDED FOR WHEEZING 2/7/2023     apixaban ANTICOAGULANT (ELIQUIS) 5 MG tablet Take 1 tablet (5 mg) by mouth 2 times daily 2/7/2023     atorvastatin (LIPITOR) 20 MG tablet TAKE 1 TABLET(20 MG) BY MOUTH AT BEDTIME 2/7/2023     budesonide-formoterol (SYMBICORT) 160-4.5 MCG/ACT Inhaler Inhale 2 puffs into the lungs 2 times daily Past Week     diltiazem ER COATED BEADS (CARDIZEM CD/CARTIA XT) 120 MG 24 hr capsule TAKE 1 CAPSULE(120 MG) BY MOUTH DAILY 2/7/2023     furosemide (LASIX) 40 MG tablet Take 1 tablet (40 mg) by mouth 2 times daily 2/7/2023     gabapentin (NEURONTIN) 600 MG tablet TAKE 1 TABLET(600 MG) BY MOUTH THREE TIMES DAILY 2/7/2023     ipratropium - albuterol 0.5 mg/2.5 mg/3 mL (DUONEB) 0.5-2.5 (3) MG/3ML neb solution Take 1 vial (3 mLs) by nebulization every 4 hours as needed for shortness of breath / dyspnea or wheezing More than a month     meclizine (ANTIVERT) 25 MG tablet TAKE 1 TABLET(25 MG) BY MOUTH THREE TIMES DAILY AS NEEDED FOR DIZZINESS OR NAUSEA Past Month     metFORMIN (GLUCOPHAGE) 500 MG tablet TAKE 1 TABLET(500 MG) BY MOUTH TWICE DAILY WITH MEALS 2/7/2023     naloxone (NARCAN) 4 MG/0.1ML nasal spray Spray 1 spray (4 mg) into one nostril alternating nostrils once as needed for opioid reversal every 2-3 minutes until assistance arrives Unknown     nystatin (NYSTOP) powder [NYSTATIN (NYSTOP) POWDER] Apply 1 application topically 2 (two) times a day as needed. 2-3 times to affected area(s). Past Month      omeprazole (PRILOSEC) 20 MG DR capsule Take 20 mg by mouth daily 2/7/2023     oxyCODONE IR (ROXICODONE) 10 MG tablet Take 1 tablet (10 mg) by mouth every 6 hours as needed for moderate to severe pain 2/7/2023     potassium chloride ER (KLOR-CON M) 20 MEQ CR tablet Take 2 tablets (40 mEq) by mouth daily 2/7/2023     sucralfate (CARAFATE) 1 GM tablet TAKE 1 TABLET BY MOUTH FOUR TIMES DAILY. CRUSH TABLET AND MIX IT WITH A LITTLE WATER THEN SWALLOW 2/7/2023       Information source(s): Patient, Patient's pharmacy and Clinic records  Method of interview communication: in-person    Summary of Changes to PTA Med List  New: Symbicort  Discontinued: Breo  Changed: None    Patient was asked about OTC/herbal products specifically.  PTA med list reflects this.    In the past week, patient estimated taking medication this percent of the time:  greater than 90%.    Medication Affordability:  Not including over the counter (OTC) medications, was there a time in the past 12 months when you did not take your medications as prescribed because of cost?: No    Allergies were reviewed, assessed, and updated with the patient.      Patient did not bring any medications to the hospital and can't retrieve from home. No multi-dose medications are available for use during hospital stay.     The information provided in this note is only as accurate as the sources available at the time of the update(s).    Thank you for the opportunity to participate in the care of this patient.    Joaquin Roldan [facility]  2/8/2023 9:41 AM

## 2023-02-08 NOTE — CONSULTS
[facility]   [phone]          Assessment/Recommendations   Patient with known history of aortic stenosis which just last month was judged to be moderate by echocardiography.  She also has a distant history of pulmonary vein isolation procedure and atrial flutter ablation.  She has been in the emergency room now for a second time with narrow complex tachycardia and I believe consistent with atypical atrial flutter on this admission.  She was cardioverted after adenosine and intravenous diltiazem did not break the rhythm.    She does have risk factors for developing atrial dysrhythmias with significant pulmonary disease requiring home oxygen at night.  Aortic stenosis may also increase her risk for atrial dysrhythmias.    She is appropriately anticoagulated at this time.  We will increase her diltiazem orally.  I think she could well benefit from repeat ablation for atrial dysrhythmias, namely atypical atrial flutter but will run this by our electrophysiologist.  She is not at all eager to do this and is hoping medications will take the place of any procedures but I think she would consider it if we thought it could be successful.    I expect she can go home tomorrow we could line her up with EP consultation for consideration of ablation if she would be willing.    Thank you for allowing us to participate in her care.       History of Present Illness/Subjective    Ms. Mary Kay Tejada is a 73 year old female with known obstructive pulmonary disease requiring nocturnal oxygen, hypertension, aortic stenosis which is judged moderate on echo last month.  She has had known atrial dysrhythmias with a distant history of pulmonary vein isolation procedure in the distant history of an A-fib flutter ablation.  She was admitted with quite dramatic fluid retention, acute on chronic heart failure with preserved ejection fraction with moderate right pleural effusion status postthoracentesis as well as narrow  complex tachycardia which need to be cardioverted in January of this year.    She was feeling some palpitations and maybe a little more shortness of breath and her son put a O2 sat monitor on her heart rate was 180.  She came into the ER because of this.  She may be having a little bit of tightness in her chest but not dramatic at all.  She was given intravenous adenosine, 6 mg and then 12 mg without conversion.  There was some brief slowing.  She was given IV diltiazem and then she was cardioverted.  Review of her EKGs is suggestive of atypical atrial flutter.  She is feeling wiped out now but otherwise fine.  No chest discomfort and breathing feels comfortable.  Troponin is normal, B natruretic peptide is mildly increased and basic metabolic panel is unremarkable.    ECG: Personally reviewed.  Several EKGs reviewed.  Narrow complex tachycardia and some EKGs showing intermittent slowing with atypical flutter waves.  Chest x-ray shows a small pleural effusion.    Clinically Significant Risk Factors Present on Admission         # Hypomagnesemia: Lowest Mg = 1.5 mg/dL in last 2 days, will replace as needed     # Drug Induced Coagulation Defect: home medication list includes an anticoagulant medication      Cardiovascular: Cardiac Arrhythmia: Atrial flutter: Atypical                 Physical Examination Review of Systems   /55   Pulse 93   Temp 97.8  F (36.6  C) (Oral)   Resp 28   SpO2 93%   There is no height or weight on file to calculate BMI.  Wt Readings from Last 3 Encounters:   02/06/23 65.3 kg (144 lb)   01/19/23 65.7 kg (144 lb 14.4 oz)   09/27/22 70.1 kg (154 lb 8 oz)     General Appearance:   Alert, cooperative and in no acute distress.   ENT/Mouth: Patient wearing a mask.      EYES:  no scleral icterus, normal conjunctivae   Neck: JVP 8 cm. No Hepatojugular reflux. Thyroid not visualized.   Chest/Lungs:   Lungs have diminished breath sounds throughout the lung fields, equal chest wall expansion.    Cardiovascular:   S1, S2 with 1/6 systolic murmur , no clicks or rubs.  Heart sounds are distant.  Brachial, radial    Abdomen:  Nontender.    Extremities: No cyanosis, clubbing or edema   Skin: no xanthelasma, warm.    Neurologic: normal arm movement bilateral, no tremors     Psychiatric: Appropriate affect.      Enc Vitals  BP: 115/55  Pulse: 93  Resp: 28  Temp: 97.8  F (36.6  C)  Temp src: Oral  SpO2: 93 %                                           Medical History  Surgical History Family History Social History   Past Medical History:   Diagnosis Date     Anemia      Aortic stenosis      Atrial fibrillation (H)      Atrial flutter (H)      Benign neoplasm of adenomatous polyp     large intestine      Chronic constipation      Chronic pain syndrome      COPD (chronic obstructive pulmonary disease) (H)     Oxygen at night      Dependence on supplemental oxygen     Oxygen at noc, during the day as needed     Depression      Diabetes mellitus (H)      Dry eye syndrome      Fibromyalgia      Ganglion     left wrist     GERD (gastroesophageal reflux disease)      Hyperlipidemia      Hypertension      Hypokalemia      Infective otitis externa, unspecified     Created by Conversion      Larynx edema      Lung disease      Malignant neoplasm of vulva (H)     Created by Conversion [facility] Annotation: Apr 17 2007  8:24AM - Cammy Bui:  resection per Dr. Alfonso Mane 9/06;  Replacement Utility updated for latest IMO load     Medial epicondylitis      Onychomycosis      Osteoarthritis      Peptic ulcer      Polyneuropathy      Vulvar malignant neoplasm (H)     Past Surgical History:   Procedure Laterality Date     BIOPSY BREAST Right      BIOPSY BREAST Right 01/28/2015     BIOPSY BREAST Right 1/28/2015    Procedure: RIGHT BREAST BIOPSY AFTER WIRE LOCALIZATION AT 0940;  Surgeon: Renée Soriano MD;  Location: [facility] [place];  Service:      BIOPSY OF BREAST, INCISIONAL      Description: Incisional  Breast Biopsy;  Recorded: 11/13/2007;  Comments: benign     COLONOSCOPY N/A 6/14/2019    Procedure: COLONOSCOPY;  Surgeon: Eduardo Mora MD;  Location: [facility] [place];  Service: Gastroenterology     ESOPHAGOSCOPY, GASTROSCOPY, DUODENOSCOPY (EGD), COMBINED N/A 11/6/2018    Procedure: ESOPHAGOGASTRODUODENOSCOPY;  Surgeon: Lit Fernando MD;  Location: [facility] OR;  Service:      HYSTERECTOMY       JOINT REPLACEMENT Left     TKA     PICC TRIPLE LUMEN PLACEMENT  1/12/2023          WA ABLATE HEART DYSRHYTHM FOCUS      Description: Catheter Ablation Atrial Fibrillation;  Recorded: 07/31/2012;  Comments: 7/24/12 PVI with Dr. Gardiner and nilay to all 5 pulm veins and CTI fl ablation line as well.     ZZC SUPRACERV ABD HYSTERECTOMY      Description: Supracervical Hysterectomy;  Proc Date: 01/01/1985;  Comments: some cervix left!; ovaries intact; done for bleeding    Family History   Problem Relation Age of Onset     Heart Failure Mother      Cancer Other         paternal HX-laryngeal      Alcoholism Sister      No Known Problems Daughter      No Known Problems Maternal Grandmother      No Known Problems Maternal Grandfather      No Known Problems Paternal Grandmother      No Known Problems Paternal Grandfather      No Known Problems Maternal Aunt      No Known Problems Paternal Aunt      Alcoholism Sister      Alcoholism Brother      Alcoholism Father      Cancer Paternal Uncle         Gastric-Alcohol     Cancer Paternal Uncle         gastric-Alcohol     Hereditary Breast and Ovarian Cancer Syndrome No family hx of      Breast Cancer No family hx of      Colon Cancer No family hx of      Endometrial Cancer No family hx of      Ovarian Cancer No family hx of     Social History     Socioeconomic History     Marital status:      Spouse name: Not on file     Number of children: Not on file     Years of education: Not on file     Highest education level: Not on file   Occupational History     Not on file    Tobacco Use     Smoking status: Every Day     Packs/day: 0.25     Types: Cigarettes     Smokeless tobacco: Never     Tobacco comments:     seen by TTS inpatient on 3/31/22   Substance and Sexual Activity     Alcohol use: Yes     Comment: Alcoholic Drinks/day: very little     Drug use: No     Sexual activity: Not on file   Other Topics Concern     Not on file   Social History Narrative     Not on file     Social Determinants of Health     Financial Resource Strain: Not on file   Food Insecurity: Not on file   Transportation Needs: Not on file   Physical Activity: Not on file   Stress: Not on file   Social Connections: Not on file   Intimate Partner Violence: Not on file   Housing Stability: Not on file          Medications  Allergies   Current Outpatient Medications   Medication Sig Dispense Refill     albuterol (PROAIR HFA/PROVENTIL HFA/VENTOLIN HFA) 108 (90 Base) MCG/ACT inhaler INHALE 2 PUFFS BY MOUTH EVERY 4 HOURS AS NEEDED FOR WHEEZING 87.1 g 1     apixaban ANTICOAGULANT (ELIQUIS) 5 MG tablet Take 1 tablet (5 mg) by mouth 2 times daily 60 tablet 1     atorvastatin (LIPITOR) 20 MG tablet TAKE 1 TABLET(20 MG) BY MOUTH AT BEDTIME 90 tablet 2     budesonide-formoterol (SYMBICORT) 160-4.5 MCG/ACT Inhaler Inhale 2 puffs into the lungs 2 times daily 10.2 g 6     diltiazem ER COATED BEADS (CARDIZEM CD/CARTIA XT) 120 MG 24 hr capsule TAKE 1 CAPSULE(120 MG) BY MOUTH DAILY 90 capsule 3     furosemide (LASIX) 40 MG tablet Take 1 tablet (40 mg) by mouth 2 times daily 60 tablet 1     gabapentin (NEURONTIN) 600 MG tablet TAKE 1 TABLET(600 MG) BY MOUTH THREE TIMES DAILY 90 tablet 4     ipratropium - albuterol 0.5 mg/2.5 mg/3 mL (DUONEB) 0.5-2.5 (3) MG/3ML neb solution Take 1 vial (3 mLs) by nebulization every 4 hours as needed for shortness of breath / dyspnea or wheezing 90 mL 3     meclizine (ANTIVERT) 25 MG tablet TAKE 1 TABLET(25 MG) BY MOUTH THREE TIMES DAILY AS NEEDED FOR DIZZINESS OR NAUSEA 45 tablet 5     metFORMIN  "(GLUCOPHAGE) 500 MG tablet TAKE 1 TABLET(500 MG) BY MOUTH TWICE DAILY WITH MEALS 180 tablet 1     naloxone (NARCAN) 4 MG/0.1ML nasal spray Spray 1 spray (4 mg) into one nostril alternating nostrils once as needed for opioid reversal every 2-3 minutes until assistance arrives 0.2 mL 0     nystatin (NYSTOP) powder [NYSTATIN (NYSTOP) POWDER] Apply 1 application topically 2 (two) times a day as needed. 2-3 times to affected area(s). 60 g 3     omeprazole (PRILOSEC) 20 MG DR capsule Take 20 mg by mouth daily       oxyCODONE IR (ROXICODONE) 10 MG tablet Take 1 tablet (10 mg) by mouth every 6 hours as needed for moderate to severe pain 120 tablet 0     potassium chloride ER (KLOR-CON M) 20 MEQ CR tablet Take 2 tablets (40 mEq) by mouth daily 60 tablet 1     sucralfate (CARAFATE) 1 GM tablet TAKE 1 TABLET BY MOUTH FOUR TIMES DAILY. CRUSH TABLET AND MIX IT WITH A LITTLE WATER THEN SWALLOW 360 tablet 3     ACCU-CHEK SOFTCLIX LANCETS lancets [ACCU-CHEK SOFTCLIX LANCETS LANCETS] TEST THREE TIMES DAILY 300 each 3     BD ULTRA-FINE SHORT PEN NEEDLE 31 gauge x 5/16\" Ndle [BD ULTRA-FINE SHORT PEN NEEDLE 31 GAUGE X 5/16\" NDLE] TEST FOUR TIMES DAILY WITH MEALS AND AT BEDTIME 400 each 3     blood-glucose meter (ONETOUCH VERIO IQ METER) Misc [BLOOD-GLUCOSE METER (ONETOUCH VERIO IQ METER) MISC] Check blood sugar three times a day. 1 each 0     diaper,brief,adult,disposable (ADULT BRIEFS - LARGE) Misc [DIAPER,BRIEF,ADULT,DISPOSABLE (ADULT BRIEFS - LARGE) MISC] Use 3-4 daily as needed for incontinence 120 each 6     generic lancets (FINGERSTIX LANCETS) [GENERIC LANCETS (FINGERSTIX LANCETS)] Dispense brand per patient's insurance at pharmacy discretion. 300 each 0     nicotine (NICOTROL) 10 MG inhaler Use 1 cartridge as needed for urge to smoke by puffing over course of 20min.  Use 6-16 cart/day; reduce number of cart/day over 6-12 weeks. 168 each 1     ONETOUCH VERIO IQ test strip USE 1 EACH AS DIRECTED THREE TIMES DAILY AS NEEDED 300 " "strip 1    Allergies   Allergen Reactions     Celebrex [Celecoxib] Rash     patient had butterfly rash - \"lupus-like\"       Latex Rash         Lab Results    Chemistry/lipid CBC Cardiac Enzymes/BNP/TSH/INR   Lab Results   Component Value Date    CHOL 151 07/25/2022    HDL 84 07/25/2022    TRIG 72 07/25/2022    BUN 17 02/08/2023     02/08/2023    CO2 25 02/08/2023    Lab Results   Component Value Date    WBC 7.7 02/08/2023    HGB 9.2 (L) 02/08/2023    HCT 33.7 (L) 02/08/2023    MCV 81 02/08/2023     02/08/2023    Lab Results   Component Value Date    TROPONINI 0.09 02/08/2023     (H) 02/08/2023    TSH 1.43 02/08/2023    INR 1.34 (H) 02/08/2023                                           "

## 2023-02-08 NOTE — ED NOTES
Pt has been oxygen, wears oxygen at home at 1 liter. Has  COPD. Order placed as pt has been wearing it since arrival. Dr Luu aware and knows she wears it at home.

## 2023-02-08 NOTE — ED PROVIDER NOTES
EMERGENCY DEPARTMENT ENCOUNTER      NAME: Mary Kay Tejada  AGE: 73 year old female  YOB: 1950  MRN: [id number]  EVALUATION DATE & TIME: 2/8/2023  7:45 AM    PCP: Cammy Bui    ED PROVIDER: Domenica Luu M.D.      CHIEF COMPLAINT     Chief Complaint   Patient presents with     Tachycardia         FINAL IMPRESSION:     1. SVT (supraventricular tachycardia) (H)    2. Atrial flutter with rapid ventricular response (H)    3. Hypomagnesemia    4. Acute and chronic respiratory failure with hypoxia (H)    5. On home oxygen therapy    6. Acute heart failure with preserved ejection fraction (HFpEF) (H)    7. Anticoagulated by anticoagulation treatment          MEDICAL DECISION MAKING:       Pertinent Labs & Imaging studies reviewed. (See chart for details)    73 year old female presents to the Emergency Department for evaluation of palpitations    ED Course as of 02/08/23 1600 Wed Feb 08, 2023   0753 Mrs. Tejada is a 73-year-old female presents here with complaint of palpitations.   0753 Palpitations have been present for the last 3 days.  Intermittent.  No associated with chest pain sometimes she feels lightheaded sometimes she feels short of breath.  She is here with her son who says they put a monitor on her finger and her heart rate was going in the 180s.  Has a history of COPD is on 1 L of oxygen at home.   0754 She denies any vomiting or diarrhea has a history of abdominal ulcer but denies any melena.  No leg swelling or rashes.   0754 Sometimes she feels cold she does not know if it is hot flashes.   0754 She takes Eliquis denies any history of trauma.   0754 I reviewed previous records.   0754 Recently received a trigger point injection a primary care doctor.   0754 On examination chronically ill-appearing cooperative and pleasant.  She is tachycardic is regular.  Decreased breath sounds bilaterally.  Abdomen soft gastric tenderness palpation no guarding or rebound lymphatics  without edema.   0755 Differential diagnosis for palpitations and patient with previous history of paroxysmal A-fib include but not limited to return of A-fib electrolyte normalities sepsis pneumothorax acute coronary syndrome dissection PE among others.   0755 Will place patient in pulse ox cardiac and blood pressure monitors.   0755 EKG reveals narrow tachycardic rhythm with no P waves.  Left axis deviation.  Poor anterior R wave progression.   0820 Normal white blood cell count anemic at 9.2 normal platelets.   0820 Anemia previous chronic hemoglobin 9.1 over the last 9 months.   0837 Attempted vagal maneuvers x2 with no conversion.   0838 Adenosine 6 mg IV given with slow reduction of rate but back to SVT   0838 Repeat 12 mg with flutter waves back to back to SVT at a rate of 184   0838 Patient on felt nauseated.  Given Zofran.  250 cc bolus.   0838 She is on Cardizem.  Recent echo reveals ejection fraction of 55% start.  Started on Cardizem drip.   0941 Spoke with cardiology.  They did not see patient in latest admission.  They recommend Cardizem bolus and if not cardiovert electrically.   0941 Patient given 10 mg of Cardizem bolus.  Rate decreased to 160s EKG shows atrial flutter with variable rate.   0941 discussed with patient cardioversion.  She does not want to be cardioverted wants to try medications.  I think given the Cardizem did help with her rate we will start a drip.   0956 chest x-ray mild right pleural effusion improved from previous   0956 Elevaded BNP 4 weeks ago was 220.clinically patient does not appear volume overloaded   0957 Patient awake alert systolic blood pressure is 88 MAP greater than 65.  Drip.  Discussed with patient cardioversion.  She is in agreement.   1040 Written consent obtained.  Patient given etomidate 10 mg.  Cardioverted with synchronized 200.  Observed until patient awoke.  Lowest O2 sat 87% tolerated procedure well.   1119 Spoke with hospitalist accepts patient cardiac  telemetry observation.   1256 Per nurse lactic acid warning is coming up.  I do not see any evidence of infection.  Will order lactic acid and procalcitonin.  Previous effusion advised significantly improved.  Patient denies any other complaints   1257 Clinical impression and decision making  73-year-old female history of COPD on 1 L of oxygen presents with palpitations.  Heart rate of 184.  Appears SVT.  History of A-fib with ablation.   1258 Cough some shortness of breath no chest pain.   1258 Vagal maneuvers where he initiated.  No change.  They note seen 6 mg and 12 mg with no change.  Initially declined cardioversion started on Cardizem 10 mg bolus with decrease in the rate and flutter waves seen.  Started on a drip but blood pressure decreased.   1258 Discussed with patient she is now in agreement with cardioversion.   1259 Cardioverted to sinus rhythm.   1259 Entertained possibility of infection lactic acid warning is coming up no signs of infection.  Pleural effusion is decreased pain.   1259 We will do lactic acid.   1259 Low magnesium this was replaced.  Anemia is chronic.  Elevated BNP clinically appears volume depleted.   1259 Spoke with hospitalist was asked patient cardiac telemetry observation.  At the time of the dictation patient boarding in the emergency department pending admission.   1300 INR(!): 1.34   1324 Mild elevation of the lactic acid at 2.5.  We will obtain a urine.  No other signs of infections currently.   1444 Urine negative no signs of infection.  Procalcitonin 0.03.  We will repeat the lactic acid.       Vital Signs: Tachycardia  EKG: Narrow tachycardic poor anterior progression left axis deviation EKG #2 atrial flutter with variable rate poor anterior progression left axis deviation  Imaging:  Home Meds:  ED meds/abx:  Fluids:    Labs  K 4.6  Cr 0.70  Wbc 7.7  Hgb 9.2  Platelets 432    Medical Decision Making    History:    Supplemental history from: Family Member/Significant  Other    External Record(s) reviewed: Inpatient Record: [facility] and Outpatient Record: Primary Care Physician    Work Up:    Chart documentation includes differential considered and any EKGs or imaging independently interpreted by provider, where specified.    In additional to work up documented, I considered the following work up: Documented in chart, if applicable.    External consultation:    Discussion of management with another provider: Cardiology    Complicating factors:    Care impacted by chronic illness: Chronic Lung Disease    Care affected by social determinants of health: N/A    Disposition considerations: Admit.          Review of Previous Records    Outside records:  [facility] 2021: fibromyalgia, paroxysmal atrial fibrillation, hypertension, DM2, gastric ulcer    Inside records: PCP 2/2023 trigger point injection    Interpretation Summary     Technically difficult study.Extremely difficult acoustic windows despite the  use of contrast for endcardial border definition.     1.The left ventricle is normal in size. Left ventricular function is  decreased. The ejection fraction is 50-55% (borderline). There is borderline  global hypokinesia of the left ventricle.  2. The right ventricle is mildly dilated. Mildly decreased right ventricular  systolic function  3. Severe aortic valve calcification is present. Moderate valvular aortic  stenosis.  4. The left atrium is moderately dilated.    Consults  Cardiology: Dr. Dow    ED COURSE     8:00 AM I met with the patient, obtained history, performed an initial exam, and discussed options and plan for diagnostics and treatment here in the ED.   8:07 AM The nurse started the patient on oxygen.   8:15 AM Vagal maneuvers, no change.  8:31 AM I performed an adenosine cardioversion with 6 mg and 12 mg.  8:46 AM I spoke with [facility]iology, Dr. Dow.  9:29 AM I rechecked and updated the patient and declines electrical cardioversion.  9:58 AM The patient's BPM  is 174. Will perform electrical cardioversion. Plan is to admit her afterwards.  10:22 AM I performed the cardioversion.  10:52 AM I rechecked the patient and her BPM was 85.  11:03 AM I spoke with the accepting hospitalist, Dr. Kaiser. Plan is cardiac tele observation.  11:20 AM reevaluated awake alert denies any pain in agreement with plan.    At the conclusion of the encounter I discussed the results of all of the tests and the disposition. The questions were answered. The patient and son acknowledged understanding and was agreeable with the care plan.         Critical Care     Performed by: Dr Domenica Luu  Authorized by: Dr Domenica Luu     Total critical care time: 43 minutes  Critical care was necessary to treat or prevent imminent or life-threatening deterioration of the following conditions: 43  Critical care was time spent personally by me on the following activities: unstable tachycardia with hypotension.  Development of treatment plan with patient or surrogate, discussions with consultants, examination of patient, evaluation of patient's response to treatment, obtaining history from patient or surrogate, ordering and performing treatments and interventions, ordering and review of laboratory studies, ordering and review of radiographic studies, re-evaluation of patient's condition and monitoring for potential decompensation.  Critical care time was exclusive of separately billable procedures and treating other patients.    MEDICATIONS GIVEN IN THE EMERGENCY:     Medications   diltiazem (CARDIZEM) 125 mg in sodium chloride 0.9 % 125 mL infusion (0 mg/hr Intravenous Stopped 2/8/23 1005)   flumazenil (ROMAZICON) injection 0.2 mg (has no administration in time range)   apixaban ANTICOAGULANT (ELIQUIS) tablet 5 mg (has no administration in time range)   atorvastatin (LIPITOR) tablet 20 mg (has no administration in time range)   fluticasone-vilanterol (BREO ELLIPTA) 200-25 MCG/ACT inhaler 1 puff (1 puff  Inhalation Given 2/8/23 1407)   furosemide (LASIX) tablet 40 mg ( Oral Automatically Held 2/11/23 1600)   gabapentin (NEURONTIN) tablet 600 mg (600 mg Oral Given 2/8/23 1408)   metFORMIN (GLUCOPHAGE) tablet 500 mg (has no administration in time range)   pantoprazole (PROTONIX) EC tablet 40 mg (40 mg Oral Given 2/8/23 1409)   oxyCODONE (ROXICODONE) tablet 10 mg (has no administration in time range)   sucralfate (CARAFATE) tablet 1 g (1 g Oral Given 2/8/23 1410)   melatonin tablet 1 mg (has no administration in time range)   ondansetron (ZOFRAN ODT) ODT tab 4 mg (has no administration in time range)     Or   ondansetron (ZOFRAN) injection 4 mg (has no administration in time range)   senna-docusate (SENOKOT-S/PERICOLACE) 8.6-50 MG per tablet 1 tablet (has no administration in time range)     Or   senna-docusate (SENOKOT-S/PERICOLACE) 8.6-50 MG per tablet 2 tablet (has no administration in time range)   furosemide (LASIX) injection 40 mg (has no administration in time range)   diltiazem ER COATED BEADS (CARDIZEM CD/CARTIA XT) 24 hr capsule 180 mg (has no administration in time range)   adenosine (ADENOCARD) injection 6 mg (6 mg Intravenous Given 2/8/23 0822)   adenosine (ADENOCARD) injection 12 mg (12 mg Intravenous Given 2/8/23 0828)   ondansetron (ZOFRAN) injection 4 mg (4 mg Intravenous Given 2/8/23 0836)   0.9% sodium chloride BOLUS (0 mLs Intravenous Stopped 2/8/23 0850)   diltiazem (CARDIZEM) injection 15 mg (15 mg Intravenous Given 2/8/23 0909)   magnesium sulfate 2 g in water intermittent infusion (0 g Intravenous Stopped 2/8/23 1139)   etomidate (AMIDATE) injection 6.6 mg (10 mg Intravenous Given 2/8/23 1026)   ondansetron (ZOFRAN) injection 4 mg (4 mg Intravenous Given 2/8/23 1153)       NEW PRESCRIPTIONS STARTED AT TODAY'S ER VISIT     New Prescriptions    No medications on file          =================================================================    HPI     Patient information was obtained from: patient  and patient's son    Use of : N/A       Mary Kay Tejada is a 73 year old female who presents via walk-in with son for evaluation of tachycardia.    The patient presents becuase she has had a heart rate in the 180s for the last 2 days. She has also had intermittent lightheadedness and shortness of breath. The heart rate was read on a fingertip pulse oximeter that has read her oxygen level 94-96% consistently. She has a history of a gastric ulcer and takes a pill for this twice a day as well as 40 mg Eliquis twice a day. She has a chronic cough and chronic intermittent abdominal pain that are unchanged. The patient uses oxygen at home.    She denies chest pain, chest pressure, diaphoresis, fever, chills, hemoptysis, vision changes, hearing changes, or any other complaints at this time.         REVIEW OF SYSTEMS   Review of Systems   Constitutional: Negative for chills, diaphoresis and fever.   HENT: Negative for hearing loss.    Eyes: Negative for visual disturbance.   Respiratory: Positive for shortness of breath. Negative for cough and chest tightness.    Cardiovascular: Positive for palpitations (tachycardia). Negative for chest pain.   Gastrointestinal: Negative for abdominal pain.   Neurological: Positive for light-headedness.   All other systems reviewed and are negative.       PAST MEDICAL HISTORY:     Past Medical History:   Diagnosis Date     Anemia      Aortic stenosis      Atrial fibrillation (H)      Atrial flutter (H)      Benign neoplasm of adenomatous polyp     large intestine      Chronic constipation      Chronic pain syndrome      COPD (chronic obstructive pulmonary disease) (H)     Oxygen at night      Dependence on supplemental oxygen     Oxygen at noc, during the day as needed     Depression      Diabetes mellitus (H)      Dry eye syndrome      Fibromyalgia      Ganglion     left wrist     GERD (gastroesophageal reflux disease)      Hyperlipidemia      Hypertension      Hypokalemia       Infective otitis externa, unspecified     Created by Conversion      Larynx edema      Lung disease      Malignant neoplasm of vulva (H)     Created by Conversion [facility] Annotation: Apr 17 2007  8:24AM - Cammy Bui:  resection per Dr. Alfonso Mane 9/06;  Replacement Utility updated for latest IMO load     Medial epicondylitis      Onychomycosis      Osteoarthritis      Peptic ulcer      Polyneuropathy      Vulvar malignant neoplasm (H)        PAST SURGICAL HISTORY:     Past Surgical History:   Procedure Laterality Date     BIOPSY BREAST Right      BIOPSY BREAST Right 01/28/2015     BIOPSY BREAST Right 1/28/2015    Procedure: RIGHT BREAST BIOPSY AFTER WIRE LOCALIZATION AT 0940;  Surgeon: Renée Soriano MD;  Location: [facility];  Service:      BIOPSY OF BREAST, INCISIONAL      Description: Incisional Breast Biopsy;  Recorded: 11/13/2007;  Comments: benign     COLONOSCOPY N/A 6/14/2019    Procedure: COLONOSCOPY;  Surgeon: Eduardo Mora MD;  Location: [facility];  Service: Gastroenterology     ESOPHAGOSCOPY, GASTROSCOPY, DUODENOSCOPY (EGD), COMBINED N/A 11/6/2018    Procedure: ESOPHAGOGASTRODUODENOSCOPY;  Surgeon: Lit Fernando MD;  Location: [facility];  Service:      HYSTERECTOMY       JOINT REPLACEMENT Left     TKA     PICC TRIPLE LUMEN PLACEMENT  1/12/2023          MS ABLATE HEART DYSRHYTHM FOCUS      Description: Catheter Ablation Atrial Fibrillation;  Recorded: 07/31/2012;  Comments: 7/24/12 PVI with Dr. Gardiner and nilay to all 5 pulm veins and CTI fl ablation line as well.     ZZC SUPRACERV ABD HYSTERECTOMY      Description: Supracervical Hysterectomy;  Proc Date: 01/01/1985;  Comments: some cervix left!; ovaries intact; done for bleeding         CURRENT MEDICATIONS:   albuterol (PROAIR HFA/PROVENTIL HFA/VENTOLIN HFA) 108 (90 Base) MCG/ACT inhaler  apixaban ANTICOAGULANT (ELIQUIS) 5 MG tablet  atorvastatin (LIPITOR) 20 MG tablet  budesonide-formoterol  "(SYMBICORT) 160-4.5 MCG/ACT Inhaler  diltiazem ER COATED BEADS (CARDIZEM CD/CARTIA XT) 120 MG 24 hr capsule  furosemide (LASIX) 40 MG tablet  gabapentin (NEURONTIN) 600 MG tablet  ipratropium - albuterol 0.5 mg/2.5 mg/3 mL (DUONEB) 0.5-2.5 (3) MG/3ML neb solution  meclizine (ANTIVERT) 25 MG tablet  metFORMIN (GLUCOPHAGE) 500 MG tablet  naloxone (NARCAN) 4 MG/0.1ML nasal spray  nystatin (NYSTOP) powder  omeprazole (PRILOSEC) 20 MG DR capsule  oxyCODONE IR (ROXICODONE) 10 MG tablet  potassium chloride ER (KLOR-CON M) 20 MEQ CR tablet  sucralfate (CARAFATE) 1 GM tablet  ACCU-CHEK SOFTCLIX LANCETS lancets  BD ULTRA-FINE SHORT PEN NEEDLE 31 gauge x 5/16\" Ndle  blood-glucose meter (ONETOUCH VERIO IQ METER) Misc  diaper,brief,adult,disposable (ADULT BRIEFS - LARGE) Misc  generic lancets (FINGERSTIX LANCETS)  nicotine (NICOTROL) 10 MG inhaler  ONETOUCH VERIO IQ test strip         ALLERGIES:     Allergies   Allergen Reactions     Celebrex [Celecoxib] Rash     patient had butterfly rash - \"lupus-like\"       Latex Rash       FAMILY HISTORY:     Family History   Problem Relation Age of Onset     Heart Failure Mother      Cancer Other         paternal HX-laryngeal      Alcoholism Sister      No Known Problems Daughter      No Known Problems Maternal Grandmother      No Known Problems Maternal Grandfather      No Known Problems Paternal Grandmother      No Known Problems Paternal Grandfather      No Known Problems Maternal Aunt      No Known Problems Paternal Aunt      Alcoholism Sister      Alcoholism Brother      Alcoholism Father      Cancer Paternal Uncle         Gastric-Alcohol     Cancer Paternal Uncle         gastric-Alcohol     Hereditary Breast and Ovarian Cancer Syndrome No family hx of      Breast Cancer No family hx of      Colon Cancer No family hx of      Endometrial Cancer No family hx of      Ovarian Cancer No family hx of        SOCIAL HISTORY:     Social History     Socioeconomic History     Marital status: "    Tobacco Use     Smoking status: Every Day     Packs/day: 0.25     Types: Cigarettes     Smokeless tobacco: Never     Tobacco comments:     seen by TTS inpatient on 3/31/22   Substance and Sexual Activity     Alcohol use: Yes     Comment: Alcoholic Drinks/day: very little     Drug use: No       VITALS:   /55   Pulse 93   Temp 97.8  F (36.6  C) (Oral)   Resp 22   SpO2 94%     PHYSICAL EXAM     Physical Exam  Vitals and nursing note reviewed. Exam conducted with a chaperone present.   Constitutional:       General: She is not in acute distress.     Appearance: She is not toxic-appearing or diaphoretic.      Comments: Fragile female cooperative and pleasant.   Pulmonary:      Comments: tachypneia decreased breath sounds bilaterally.  Musculoskeletal:      Right lower leg: No edema.      Left lower leg: No edema.   Neurological:      Mental Status: She is alert.         Physical Exam   Constitutional: Elderly female, chronically ill appearing, cooperative.    Head: Atraumatic.     Nose: Nose normal.     Mouth/Throat: Slightly dry mucous membranes.    Eyes: EOM are normal. Pupils are equal, round, and reactive to light.     Ears: Ears normal.    Neck: Normal range of motion. Neck supple.     Cardiovascular: Tachycardic, regular rhythm and normal heart sounds.      Pulmonary/Chest: Normal effort, decreased breath sounds bilaterally.    Abdominal: Midline surgical scar.    Musculoskeletal: Normal range of motion.     Neurological: Awake alert appropriate answering questions no focal neurological deficits.    Lymphatics: No lower extremity edema.    : NA    Skin: Skin is warm and dry. Tattoos, no petechiae.    Psychiatric: Normal mood and affect. Behavior is normal.       LAB:     All pertinent labs reviewed and interpreted.  Labs Ordered and Resulted from Time of ED Arrival to Time of ED Departure   INR - Abnormal       Result Value    INR 1.34 (*)    B-TYPE NATRIURETIC PEPTIDE ([facility] ONLY) -  Abnormal     (*)    MAGNESIUM - Abnormal    Magnesium 1.5 (*)    CBC WITH PLATELETS AND DIFFERENTIAL - Abnormal    WBC Count 7.7      RBC Count 4.15      Hemoglobin 9.2 (*)     Hematocrit 33.7 (*)     MCV 81      MCH 22.2 (*)     MCHC 27.3 (*)     RDW 20.9 (*)     Platelet Count 432      % Neutrophils 62      % Lymphocytes 27      % Monocytes 9      % Eosinophils 1      % Basophils 1      % Immature Granulocytes 0      NRBCs per 100 WBC 0      Absolute Neutrophils 4.7      Absolute Lymphocytes 2.1      Absolute Monocytes 0.7      Absolute Eosinophils 0.1      Absolute Basophils 0.1      Absolute Immature Granulocytes 0.0      Absolute NRBCs 0.0     LACTIC ACID WHOLE BLOOD - Abnormal    Lactic Acid 2.5 (*)    BASIC METABOLIC PANEL - Normal    Sodium 142      Potassium 4.6      Chloride 104      Carbon Dioxide (CO2) 25      Anion Gap 13      Urea Nitrogen 17      Creatinine 0.70      Calcium 9.6      Glucose 119      GFR Estimate >90     TROPONIN I - Normal    Troponin I 0.09     TSH WITH FREE T4 REFLEX - Normal    TSH 1.43     ISTAT TROPONIN POCT - Normal    TROPPC POCT 0.05     PROCALCITONIN - Normal    Procalcitonin 0.03     ROUTINE UA WITH MICROSCOPIC REFLEX TO CULTURE - Normal    Color Urine Colorless      Appearance Urine Clear      Glucose Urine Negative      Bilirubin Urine Negative      Ketones Urine Negative      Specific Gravity Urine 1.008      Blood Urine Negative      pH Urine 5.0      Protein Albumin Urine Negative      Urobilinogen Urine <2.0      Nitrite Urine Negative      Leukocyte Esterase Urine Negative      RBC Urine <1      WBC Urine <1      Squamous Epithelials Urine <1     LACTIC ACID WHOLE BLOOD        RADIOLOGY:     Reviewed all pertinent imaging. Please see official radiology report.  XR Chest Port 1 View   Final Result   IMPRESSION: Small right pleural effusion with some atelectasis in the right lung base has improved since 01/10/2023. Addition the appearance of the left lung base  has improved as well now is clear.           EKG:     EKG #1  SVT poor anterior progression left axis deviation    Time:070904    Ventricular rate 186 bmp  [place] LAD  TX interval ms  QRS duration 92 ms  QT//475 ms    Compared to previous EKG on 10/20/2023 patient was in sinus rhythm with premature complexes.  Left axis deviation was seen before poor anterior progression is new.    I have independently reviewed and interpreted the EKG(s) documented above.    EKG #2  A flutter with variable rate poor anterior progression left axis deviation    Time:378185    Ventricular rate 124 bmp  [place] LAD  TX interval ms  QRS duration 98 ms  QT//436 ms    Compared to previous EKG on compared to EKG on February she is in flutter now.  Completed allergic to earlier EKG she is no longer on  SVT.    EKG #1 sinus PVCs   Poor anterior progression normal axis left axis deviation    Time:264972    Ventricular rate 86bmp  [place] LAD  TX interval 170 ms  QRS duration 108 ms  QT//421 ms    Compared to previous EKG on earlier today no longer on flutter converted to sinus with PVCs.  PROCEDURES:     Procedures    PROCEDURE: Electrical Cardioversion with Procedural Sedation   INDICATIONS: Sedation is required to allow for Cardioversion for Atrial Fibrilation    CARDIOVERSION TYPE: Biphasic, External   SEDATION PROVIDER: Dr Domenica Luu   CARDIOVERSION PROVIDER: Dr Domenica Luu   LEVEL OF SEDATION: Deep Sedation    Defined as:  Minimal = Normal response to verbal  Moderate = Responds to verbal and light tactile stimulation  Deep = Responds after repeated painful stimulation   CONSENT: Risks, benefits and alternatives were discussed with and Written consent was obtained from Patient.   PROCEDURE SPECIFIC CHECKLIST COMPLETED: Yes   LAST ORAL INTAKE: Clear Liquids >2 hours and Regular Meal > 8 hours   ASA CLASS: 3 - Severe systemic disease, but not incapacitating   MALLAMPATI:  II - Faucial pillars and soft palate may be seen,  but uvula is masked by the base of the tongue   TIME OUT: Universal protocol was followed. TIME OUT conducted just prior to starting procedure confirmed patient identity, site/side, procedure, patient position, and availability of correct equipment. Yes    Immediately prior to initiation of sedation, reassessment of clinical condition was performed which was unchanged.   MEDICATIONS GIVEN: Etomidate, 10 mg, IV    MONITORING: heart rate, cardiac monitor, continuous pulse oximeter, continuous capnometry (end tidal CO2), frequent blood pressure checks, level of consciousness checks, IV access, constant attendance by RN until patient is recovered and constant attendance by MD until patient is stable   RESPONSE: vital signs stable, airway patent and O2 saturations remained >92%   POST-SEDATION ASSESSMENT/PROCEDURE NOTE: CARDIOVERSION: Trial 1: Synchronized shock at 100 joules was Successful    SEDATION:  Lowest level oxygen saturation reached was 87%.    Post procedure patient was alert and responds to verbal stimuli    Patient was monitored during recovery and returned to pre-procedure baseline.   TOTAL MD DRUG ADMINISTRATION / MONITORING TIME: 12 minutes.   COMPLICATIONS: Patient tolerated procedure well, without complication         PROCEDURE: Sedation   INDICATIONS: Sedation is required to allow for cardioversion   SEDATION PROVIDER: Dr Domenica Luu   PROCEDURE PROVIDER: Dr Domenica Luu   LEVEL OF SEDATION: Deep Sedation    Defined as:  Minimal = Normal response to verbal  Moderate = Responds to verbal and light tactile stimulation  Deep = Responds after repeated painful stimulation   CONSENT: Risks, benefits and alternatives were discussed with and Written consent was obtained from Patient.   PROCEDURE SPECIFIC CHECKLIST COMPLETED:   Yes   LAST ORAL INTAKE: Clear Liquids >2 hours and Regular Meal > 8 hours   ASA CLASS: 3 - Severe systemic disease, but not incapacitating   MALLAMPATI:  I - Faucial pillars, soft  palate, and uvula are visible   TIME OUT: Universal protocol was followed. TIME OUT conducted just prior to starting procedure confirmed patient identity, site/side, procedure, patient position, and availability of correct equipment. Yes    Immediately prior to initiation of sedation, reassessment of clinical condition was performed which was unchanged.   MEDICATIONS: Etomidate, 10 mg, IV   MONITORING: Monitoring consisted of:   heart rate, cardiac monitor, continuous pulse oximeter, continuous capnometry (end tidal CO2), frequent blood pressure checks, level of consciousness checks, IV access, constant attendance by RN until patient is recovered and constant attendance by MD until patient is stable   RESPONSE: vital signs stable, airway patent and O2 saturations remained >92%   POST-SEDATION ASSESSMENT/NOTE: Lowest level oxygen saturation reached was 87%.    Post procedure patient was alert and responds to verbal stimuli    Patient was monitored during recovery and returned to pre-procedure baseline.   ADDITIONAL MD ASSISTANCE: None   TOTAL MD DRUG ADMINISTRATION / MONITORING TIME: 12 minutes   COMPLICATIONS:  Patient tolerated procedure well, without complication         I, Manoj Richards, am serving as a scribe to document services personally performed by Dr. Luu based on my observation and the provider's statements to me. I, Domenica Luu MD attest that Manoj Richards is acting in a scribe capacity, has observed my performance of the services and has documented them in accordance with my direction.    Domenica Luu M.D.  Emergency Medicine  [facility] EMERGENCY ROOM  [address] [facility] [address]  [phone]  Dept: [phone]     Domenica Luu MD  02/08/23 1301       Domenica Luu MD  02/08/23 1600

## 2023-02-09 ENCOUNTER — APPOINTMENT (OUTPATIENT)
Dept: PHYSICAL THERAPY | Facility: CLINIC | Age: 73
End: 2023-02-09
Attending: EMERGENCY MEDICINE
Payer: COMMERCIAL

## 2023-02-09 VITALS
WEIGHT: 147.71 LBS | TEMPERATURE: 98.8 F | OXYGEN SATURATION: 93 % | RESPIRATION RATE: 16 BRPM | SYSTOLIC BLOOD PRESSURE: 104 MMHG | BODY MASS INDEX: 24.58 KG/M2 | HEART RATE: 86 BPM | DIASTOLIC BLOOD PRESSURE: 59 MMHG

## 2023-02-09 LAB
ANION GAP SERPL CALCULATED.3IONS-SCNC: 9 MMOL/L (ref 5–18)
BUN SERPL-MCNC: 16 MG/DL (ref 8–28)
CALCIUM SERPL-MCNC: 9.1 MG/DL (ref 8.5–10.5)
CHLORIDE BLD-SCNC: 101 MMOL/L (ref 98–107)
CO2 SERPL-SCNC: 31 MMOL/L (ref 22–31)
CREAT SERPL-MCNC: 0.68 MG/DL (ref 0.6–1.1)
GFR SERPL CREATININE-BSD FRML MDRD: >90 ML/MIN/1.73M2
GLUCOSE BLD-MCNC: 96 MG/DL (ref 70–125)
POTASSIUM BLD-SCNC: 3.6 MMOL/L (ref 3.5–5)
SODIUM SERPL-SCNC: 141 MMOL/L (ref 136–145)

## 2023-02-09 PROCEDURE — 97161 PT EVAL LOW COMPLEX 20 MIN: CPT | Mod: GP

## 2023-02-09 PROCEDURE — G0378 HOSPITAL OBSERVATION PER HR: HCPCS

## 2023-02-09 PROCEDURE — 99231 SBSQ HOSP IP/OBS SF/LOW 25: CPT | Performed by: INTERNAL MEDICINE

## 2023-02-09 PROCEDURE — 36415 COLL VENOUS BLD VENIPUNCTURE: CPT | Performed by: EMERGENCY MEDICINE

## 2023-02-09 PROCEDURE — 80048 BASIC METABOLIC PNL TOTAL CA: CPT | Performed by: EMERGENCY MEDICINE

## 2023-02-09 PROCEDURE — 250N000013 HC RX MED GY IP 250 OP 250 PS 637: Performed by: INTERNAL MEDICINE

## 2023-02-09 PROCEDURE — 250N000013 HC RX MED GY IP 250 OP 250 PS 637: Performed by: EMERGENCY MEDICINE

## 2023-02-09 PROCEDURE — 250N000013 HC RX MED GY IP 250 OP 250 PS 637: Performed by: HOSPITALIST

## 2023-02-09 RX ORDER — FUROSEMIDE 10 MG/ML
60 INJECTION INTRAMUSCULAR; INTRAVENOUS ONCE
Status: DISCONTINUED | OUTPATIENT
Start: 2023-02-09 | End: 2023-02-09

## 2023-02-09 RX ORDER — MAGNESIUM OXIDE 400 MG/1
400 TABLET ORAL DAILY
Qty: 30 TABLET | Refills: 0 | Status: SHIPPED | OUTPATIENT
Start: 2023-02-09 | End: 2024-07-01

## 2023-02-09 RX ORDER — DILTIAZEM HYDROCHLORIDE 180 MG/1
180 CAPSULE, COATED, EXTENDED RELEASE ORAL DAILY
Qty: 30 CAPSULE | Refills: 0 | Status: SHIPPED | OUTPATIENT
Start: 2023-02-10 | End: 2023-03-11

## 2023-02-09 RX ADMIN — METFORMIN HYDROCHLORIDE 500 MG: 500 TABLET, FILM COATED ORAL at 07:39

## 2023-02-09 RX ADMIN — PANTOPRAZOLE SODIUM 40 MG: 20 TABLET, DELAYED RELEASE ORAL at 07:39

## 2023-02-09 RX ADMIN — GABAPENTIN 600 MG: 600 TABLET, FILM COATED ORAL at 07:39

## 2023-02-09 RX ADMIN — SUCRALFATE 1 G: 1 TABLET ORAL at 07:39

## 2023-02-09 RX ADMIN — APIXABAN 5 MG: 5 TABLET, FILM COATED ORAL at 07:39

## 2023-02-09 RX ADMIN — FUROSEMIDE 80 MG: 40 TABLET ORAL at 00:06

## 2023-02-09 RX ADMIN — DILTIAZEM HYDROCHLORIDE 180 MG: 180 CAPSULE, EXTENDED RELEASE ORAL at 07:39

## 2023-02-09 ASSESSMENT — ACTIVITIES OF DAILY LIVING (ADL)
ADLS_ACUITY_SCORE: 33

## 2023-02-09 NOTE — PROGRESS NOTES
OT: Orders received. Chart reviewed and discussed with care team.  OT not indicated due to pt being at baseline per PT.  Defer discharge recommendations to PT and rest of care team.  Will complete orders.

## 2023-02-09 NOTE — CONSULTS
Care Management Initial Consult    General Information  Assessment completed with: Patient,    Type of CM/SW Visit: Initial Assessment  Primary Care Provider verified and updated as needed: Yes   Readmission within the last 30 days: previous discharge plan unsuccessful   Return Category: Exacerbation of disease  Reason for Consult: discharge planning, health care directive, transportation  Advance Care Planning: Advance Care Planning Reviewed: no concerns identified, other (see comments)        Communication Assessment  Patient's communication style: spoken language (English or Bilingual)    Hearing Difficulty or Deaf: no      Cognitive  Cognitive/Neuro/Behavioral: (P) WDL    Level of Consciousness: alert    Arousal Level: (P) opens eyes spontaneously    Orientation: (P) oriented x 4          Speech: (P) clear, logical    Living Environment:   People in home: child(david), adult     Current living Arrangements: house      Able to return to prior arrangements: yes     Family/Social Support:  Care provided by: child(david), homecare agency (PCA is son's girlfriend.)  Provides care for: no one, unable/limited ability to care for self  Marital Status:   Children, PCA          Description of Support System: Involved, Supportive    Support Assessment: Adequate social supports, Adequate family and caregiver support    Current Resources:   Patient receiving home care services: Yes  Community Resources: County Worker, Financial/Insurance, Housekeeping/Chore Agency, PCA  Equipment currently used at home: cane, straight, walker, rolling, grab bar, tub/shower, shower chair  Supplies currently used at home: Diabetic Supplies, Oxygen Tubing/Supplies, Nebulizer tubing    Employment/Financial:  Employment Status: retired     Financial Concerns: No concerns identified   Referral to Financial Worker: No     Lifestyle & Psychosocial Needs:  Social Determinants of Health     Tobacco Use: High Risk     Smoking Tobacco Use: Every Day  "    Smokeless Tobacco Use: Never     Passive Exposure: Not on file   Alcohol Use: Not on file   Financial Resource Strain: Not on file   Food Insecurity: Not on file   Transportation Needs: Not on file   Physical Activity: Not on file   Stress: Not on file   Social Connections: Not on file   Intimate Partner Violence: Not on file   Depression: Not at risk     PHQ-2 Score: 0   Housing Stability: Not on file     Functional Status:  Prior to admission patient needed assistance:   Dependent ADLs:: Ambulation-cane, Ambulation-walker, Bathing, Dressing  Dependent IADLs:: Cleaning, Cooking, Laundry, Shopping, Meal Preparation, Medication Management, Transportation  Assessment of Functional Status: Not at  functional baseline    Mental Health Status:  Mental Health Status: No Current Concerns       Chemical Dependency Status:  Chemical Dependency Status: No Current Concerns           Values/Beliefs:  Spiritual, Cultural Beliefs, Scientology Practices, Values that affect care: no (None identified)             Additional Information:  Writer met with pt and her son's s/o to provide [facility] info, introduce Care Management(CM), and obtain an initial assessment. Pt resides with her son Heriberto and his girlfriend who is also pt's PCA. Pt receives some homemaking services as well as 2.25 hrs/day of PCA care through [facility] . No issues reported with said services. No concerns identified in regards to eventual return home.    02/08 per GUSTAVO Randhawa-\"73 year old female who presents via walk-in with son for evaluation of tachycardia. The patient presents becuase she has had a heart rate in the 180s for the last 2 days. She has also had intermittent lightheadedness and shortness of breath. The heart rate was read on a fingertip pulse oximeter that has read her oxygen level 94-96% consistently. She has a history of a gastric ulcer and takes a pill for this twice a day as well as 40 mg Eliquis twice a day. She has a chronic cough and " "chronic intermittent abdominal pain that are unchanged. The patient uses oxygen at home.\"    PT&OT Consults pending. Anticipate improvement and return home via family transport. No additional service coordination needs anticipated. CM to follow for changes in current anticipated discharge disposition.    Deric Munguia RN        "

## 2023-02-09 NOTE — PROGRESS NOTES
"PRIMARY DIAGNOSIS: \"GENERIC\" NURSING  OUTPATIENT/OBSERVATION GOALS TO BE MET BEFORE DISCHARGE:  1. ADLs back to baseline: Yes    2. Activity and level of assistance: Up with standby assistance.    3. Pain status: Pain free.    4. Return to near baseline physical activity: Yes     Discharge Planner Nurse   Safe discharge environment identified: Yes  Barriers to discharge: No       Entered by: Rocio Burrell RN 02/09/2023 5:04 AM     Please review provider order for any additional goals.   Nurse to notify provider when observation goals have been met and patient is ready for discharge.  "

## 2023-02-09 NOTE — PROGRESS NOTES
PRIMARY DIAGNOSIS: Dysrhythmia   OUTPATIENT/OBSERVATION GOALS TO BE MET BEFORE DISCHARGE:  1. Vital signs stable: Yes    2 Dyspnea improved and O2 sats >88% at RA or at prior home O2 therapy level: Yes      SpO2: 94 %, O2 Device: Nasal cannula  3. Short term supplemental O2 needed for use with activity at home: Yes    4. Tolerating adequate PO diet and medications: Yes    5. Return to near baseline physical activity: Yes    Discharge Planner Nurse   Safe discharge environment identified: Yes  Barriers to discharge: Yes, pending cardiology recommendation and clinical progress       Entered by: Carmencita Aponte RN 02/08/2023 7:36 PM     Please review provider order for any additional goals.   Nurse to notify provider when observation goals have been met and patient is ready for discharge.

## 2023-02-09 NOTE — SIGNIFICANT EVENT
Significant Event Note    Time of event: 11:48 PM February 8, 2023    Description of event:  PIV infiltrated x 2. Patient hard stick.     Plan:  -Holding IV lasix  -Ordered po lasix x 1 for tonight    Discussed with: bedside nurse    Annette Henry MD

## 2023-02-09 NOTE — PROGRESS NOTES
02/09/23 0840   Appointment Info   Signing Clinician's Name / Credentials (PT) Eduardo Coon   Living Environment   People in Home child(david), adult   Current Living Arrangements house   Home Accessibility stairs to enter home   Number of Stairs, Main Entrance 3   Stair Railings, Main Entrance railings safe and in good condition   Transportation Anticipated family or friend will provide   Living Environment Comments one level home   Self-Care   Usual Activity Tolerance moderate   Current Activity Tolerance fair   Regular Exercise No   Equipment Currently Used at Home cane, straight;walker, rolling;grab bar, tub/shower;shower chair   Fall history within last six months no   Activity/Exercise/Self-Care Comment Ind with basic ADL's , son helps with shopping, cleaning cooking as needed.  Has PCA help   General Information   Onset of Illness/Injury or Date of Surgery 02/08/23   Pertinent History of Current Problem (include personal factors and/or comorbidities that impact the POC) . SVT:s/p cardioversion: (a fib with RVR); home meds of cardizem 120,  2.  History of atrial flutter/fibrillation: on eliquis,chart check shows a PVI for a fibrillation in              2012 and history of ablation for atrial flutter though unclear date;   3. HFrEF/  Aortic stenosis:  Echo on 1/10 with ejection fraction of 50-55%;  Moderate              Stenosis with valve area of 1 cm; cardiology consult for dysrhythmia              Though will need outpatient valve clinic; continue lasix today though will              Change to TID dosing; per iv for 24 hours  4. DM2: metformin   5.  Fibromyalgia: oxycodone perhome dosing  6. Tobacco dependence: denies need for patches  7.Dyslipidemia:statins  8.  dvt prevention: on eliquis   Cognition   Affect/Mental Status (Cognition) WFL   Orientation Status (Cognition) oriented x 3   Follows Commands (Cognition) WFL   Posture    Posture Forward head position;Protracted shoulders   Range of Motion (ROM)    Range of Motion ROM is WFL   Strength (Manual Muscle Testing)   Strength (Manual Muscle Testing) No deficits observed during functional mobility   Bed Mobility   Supine-Sit [place] (Bed Mobility) independent   Transfers   Transfers sit-stand transfer   Sit-Stand Transfer   Sit-Stand [place] (Transfers) modified independence   Assistive Device (Sit-Stand Transfers) walker, front-wheeled   Gait/Stairs (Locomotion)   [place] Level (Gait) supervision   Assistive Device (Gait) walker, front-wheeled   Distance in Feet 20   Pattern (Gait) step-through   Deviations/Abnormal Patterns (Gait) gait speed decreased   Comment, (Gait/Stairs) Stable slow gait, discussion of increasing activty tolerance with short frequent walks as she recovers from being hospitalized.   Balance   Balance no deficits were identified   Sensory Examination   Sensory Perception patient reports no sensory changes   Clinical Impression   Criteria for Skilled Therapeutic Intervention Evaluation only   PT Total Evaluation Time   PT Eval, Low Complexity Minutes (45147) 15   PT Discharge Planning   PT Plan DC PT   PT Discharge Recommendation (DC Rec) home with assist   PT Rationale for DC Rec Limited by endurance, adequate strength for transfers and ambulation. Has good support at home and home setup   PT Brief overview of current status Amb 20ft with FWW, SBA   Total Session Time   Total Session Time (sum of timed and untimed services) 15

## 2023-02-09 NOTE — PROGRESS NOTES
Patient continues to be in sinus rhythm and feels well this morning.  A little bit fatigued and tired but ready to go home.    Reviewed her rhythm with both the patient and her son who is in the room at the time of our visit.  Also had our electrophysiologist review the EKGs.  Likely this is atypical atrial flutter and we will set up an appointment with our electrophysiologist to see her in the clinic and offer any other options for medical therapy or repeat ablation.    Thank you for allowing us to participate in her care.   Bcc Adenoid Histology Text: There were aggregates of basaloid cells demonstrating an adenoid or cribiform pattern.

## 2023-02-09 NOTE — PROGRESS NOTES
"PRIMARY DIAGNOSIS: \"GENERIC\" NURSING  OUTPATIENT/OBSERVATION GOALS TO BE MET BEFORE DISCHARGE:  1. ADLs back to baseline: Yes    2. Activity and level of assistance: Up with standby assistance.    3. Pain status: Pain free.    4. Return to near baseline physical activity: Yes     Discharge Planner Nurse   Safe discharge environment identified: Yes  Barriers to discharge: No       Entered by: Rocio Burrell RN 02/09/2023 5:03 AM     Please review provider order for any additional goals.   Nurse to notify provider when observation goals have been met and patient is ready for discharge.  "

## 2023-02-09 NOTE — PROGRESS NOTES
Care Management Discharge Note    Discharge Date: 02/09/2023       Discharge Disposition: Home    Discharge Services: None    Discharge DME: None    Discharge Transportation: family or friend will provide    Education Provided on the Discharge Plan:  AVS per bedside nurse.    Persons Notified of Discharge Plans: Pt, son Heriberto  Patient/Family in Agreement with the Plan: yes    Additional Information:  Pt medically ready for discharge per Dr. Kaiser.  Pt discharged to home per bedside nurse, son transported.  Pt has home O2 at baseline.  No CM needs expressed.  CC referral sent per protocol.     Roland Barakat RN/CM

## 2023-02-09 NOTE — DISCHARGE SUMMARY
[facility] MEDICINE  DISCHARGE SUMMARY     Primary Care Physician: Cammy Bui  Admission Date: 2/8/2023   Discharge Provider: Angela Kaiser MD Discharge Date: 2/9/2023   Diet:   Active Diet and Nourishment Order   Procedures     Moderate Consistent Carb (60 g CHO per Meal) Diet     Diet       Code Status: Full Code   Activity: as tolerates        Condition at Discharge: Stable     REASON FOR PRESENTATION(See Admission Note for Details)     palpitations    PRINCIPAL & ACTIVE DISCHARGE DIAGNOSES     1.  Palpitations  2.  SVT due to atypical atrial flutter  3.  Oxygen dependence due to copd,   4.  HTN  5.  COPD  6.  Tobacco dependence  7.  Fibromyalgia  8.  Dyslipidemia  9.  HFrEF, history of  10.  Aortic stenosis, history of        DISPOSITION     Home    SUMMARY OF HOSPITAL COURSE:      73 year old female into [facility] on 2/8/2023 after presenting to the ER with palpitations and an SVT with the ventricular rate of 180.  The ED team attempted chemical cardioversion though  Due to a drop in her blood pressure the decision was made for electrical cardioversion. She failed adenosine x2 and cardizem x10 mg.  She was sedated and given 200 joules for a   Successful cardioversion.  Echocardiogram done in Jan of 2023 shows HFrEF with EF of 50-55% along with moderate aortic stenosis.  Of note she had a cardioversion for the same  Reason in Jan 2023.    Pt was admitted out of an abundance of caution.  Overnight she was monitored in the telemetry  Unit. She had no further intervals of SVT.  Pt was seen by the cardiology team.  PMHx includes  Atrial fibrillation with remote pulmonary vein isolation and atrial flutter treated with an ablation.  The cardiology service reviewed her case.  It is recommended she follow closely with the  EP clinic for further discussion whether this recurrent SVT is a candidate for an ablation.  Dr Dow assisted in setting this up.    Pts  son is in the room for discussion.  She states understanding of the clinical recommendations.  The cardizem was increased to 180 mg orally.         Discharge Medications with Med changes:     Current Discharge Medication List      START taking these medications    Details   magnesium oxide (MAG-OX) 400 MG tablet Take 1 tablet (400 mg) by mouth daily  Qty: 30 tablet, Refills: 0    Associated Diagnoses: Atrial fibrillation, unspecified type (H)         CONTINUE these medications which have CHANGED    Details   diltiazem ER COATED BEADS (CARDIZEM CD/CARTIA XT) 180 MG 24 hr capsule Take 1 capsule (180 mg) by mouth daily  Qty: 30 capsule, Refills: 0    Associated Diagnoses: SVT (supraventricular tachycardia) (H)         CONTINUE these medications which have NOT CHANGED    Details   albuterol (PROAIR HFA/PROVENTIL HFA/VENTOLIN HFA) 108 (90 Base) MCG/ACT inhaler INHALE 2 PUFFS BY MOUTH EVERY 4 HOURS AS NEEDED FOR WHEEZING  Qty: 87.1 g, Refills: 1    Associated Diagnoses: Chronic obstructive pulmonary disease with acute lower respiratory infection (H)      apixaban ANTICOAGULANT (ELIQUIS) 5 MG tablet Take 1 tablet (5 mg) by mouth 2 times daily  Qty: 60 tablet, Refills: 1    Associated Diagnoses: Atrial fibrillation, unspecified type (H)      atorvastatin (LIPITOR) 20 MG tablet TAKE 1 TABLET(20 MG) BY MOUTH AT BEDTIME  Qty: 90 tablet, Refills: 2    Associated Diagnoses: Mixed hyperlipidemia      budesonide-formoterol (SYMBICORT) 160-4.5 MCG/ACT Inhaler Inhale 2 puffs into the lungs 2 times daily  Qty: 10.2 g, Refills: 6    Associated Diagnoses: COPD exacerbation (H)      furosemide (LASIX) 40 MG tablet Take 1 tablet (40 mg) by mouth 2 times daily  Qty: 60 tablet, Refills: 1    Associated Diagnoses: Acute and chronic respiratory failure with hypercapnia (H)      gabapentin (NEURONTIN) 600 MG tablet TAKE 1 TABLET(600 MG) BY MOUTH THREE TIMES DAILY  Qty: 90 tablet, Refills: 4    Associated Diagnoses: Type 2 diabetes mellitus  with hypoglycemia without coma, with long-term current use of insulin (H)      ipratropium - albuterol 0.5 mg/2.5 mg/3 mL (DUONEB) 0.5-2.5 (3) MG/3ML neb solution Take 1 vial (3 mLs) by nebulization every 4 hours as needed for shortness of breath / dyspnea or wheezing  Qty: 90 mL, Refills: 3    Associated Diagnoses: COPD exacerbation (H)      meclizine (ANTIVERT) 25 MG tablet TAKE 1 TABLET(25 MG) BY MOUTH THREE TIMES DAILY AS NEEDED FOR DIZZINESS OR NAUSEA  Qty: 45 tablet, Refills: 5    Associated Diagnoses: Vertigo      metFORMIN (GLUCOPHAGE) 500 MG tablet TAKE 1 TABLET(500 MG) BY MOUTH TWICE DAILY WITH MEALS  Qty: 180 tablet, Refills: 1    Associated Diagnoses: Type 2 diabetes mellitus (H)      naloxone (NARCAN) 4 MG/0.1ML nasal spray Spray 1 spray (4 mg) into one nostril alternating nostrils once as needed for opioid reversal every 2-3 minutes until assistance arrives  Qty: 0.2 mL, Refills: 0    Associated Diagnoses: Trigger point of thoracic region; Chronic pain syndrome      nystatin (NYSTOP) powder [NYSTATIN (NYSTOP) POWDER] Apply 1 application topically 2 (two) times a day as needed. 2-3 times to affected area(s).  Qty: 60 g, Refills: 3    Associated Diagnoses: Candidal intertrigo      omeprazole (PRILOSEC) 20 MG DR capsule Take 20 mg by mouth daily      oxyCODONE IR (ROXICODONE) 10 MG tablet Take 1 tablet (10 mg) by mouth every 6 hours as needed for moderate to severe pain  Qty: 120 tablet, Refills: 0    Associated Diagnoses: Fibromyalgia; Chronic pain syndrome      potassium chloride ER (KLOR-CON M) 20 MEQ CR tablet Take 2 tablets (40 mEq) by mouth daily  Qty: 60 tablet, Refills: 1    Associated Diagnoses: Acute and chronic respiratory failure with hypercapnia (H)      sucralfate (CARAFATE) 1 GM tablet TAKE 1 TABLET BY MOUTH FOUR TIMES DAILY. CRUSH TABLET AND MIX IT WITH A LITTLE WATER THEN SWALLOW  Qty: 360 tablet, Refills: 3    Associated Diagnoses: Iron deficiency anemia due to chronic blood loss     "  ACCU-CHEK SOFTCLIX LANCETS lancets [ACCU-CHEK SOFTCLIX LANCETS LANCETS] TEST THREE TIMES DAILY  Qty: 300 each, Refills: 3    Associated Diagnoses: Type 2 diabetes mellitus with hyperglycemia (H)      BD ULTRA-FINE SHORT PEN NEEDLE 31 gauge x 5/16\" Ndle [BD ULTRA-FINE SHORT PEN NEEDLE 31 GAUGE X 5/16\" NDLE] TEST FOUR TIMES DAILY WITH MEALS AND AT BEDTIME  Qty: 400 each, Refills: 3    Associated Diagnoses: DM (diabetes mellitus) (H)      !! blood-glucose meter (ONETOUCH VERIO IQ METER) Misc [BLOOD-GLUCOSE METER (ONETOUCH VERIO IQ METER) MISC] Check blood sugar three times a day.  Qty: 1 each, Refills: 0    Associated Diagnoses: Type 2 diabetes mellitus with hypoglycemia without coma, without long-term current use of insulin (H)      diaper,brief,adult,disposable (ADULT BRIEFS - LARGE) Misc [DIAPER,BRIEF,ADULT,DISPOSABLE (ADULT BRIEFS - LARGE) MISC] Use 3-4 daily as needed for incontinence  Qty: 120 each, Refills: 6    Associated Diagnoses: Mixed stress and urge urinary incontinence      generic lancets (FINGERSTIX LANCETS) [GENERIC LANCETS (FINGERSTIX LANCETS)] Dispense brand per patient's insurance at pharmacy discretion.  Qty: 300 each, Refills: 0    Comments: Test three times daily.  Associated Diagnoses: Type 2 diabetes mellitus with hyperglycemia, unspecified whether long term insulin use (H)      nicotine (NICOTROL) 10 MG inhaler Use 1 cartridge as needed for urge to smoke by puffing over course of 20min.  Use 6-16 cart/day; reduce number of cart/day over 6-12 weeks.  Qty: 168 each, Refills: 1    Associated Diagnoses: Cigarette nicotine dependence without complication      !! ONETOUCH VERIO IQ test strip USE 1 EACH AS DIRECTED THREE TIMES DAILY AS NEEDED  Qty: 300 strip, Refills: 1    Associated Diagnoses: Type 2 diabetes mellitus with hypoglycemia without coma, without long-term current use of insulin (H)       !! - Potential duplicate medications found. Please discuss with provider.                Rationale " for medication changes:      Consults       CARDIOLOGY IP CONSULT  PHYSICAL THERAPY ADULT IP CONSULT  OCCUPATIONAL THERAPY ADULT IP CONSULT  CARE MANAGEMENT / SOCIAL WORK IP CONSULT    Immunizations given this encounter     Most Recent Immunizations   Administered Date(s) Administered     COVID-19 Vaccine 12+ (Pfizer 2022) 07/25/2022     COVID-19 Vaccine 12+ (Pfizer) 11/22/2021     COVID-19 Vaccine Bivalent Booster 12+ (Pfizer) 09/27/2022     DT (PEDS <7y) 03/01/2004     Flu, Unspecified 10/22/2008     HepA, Unspecified 08/03/2010     Influenza (High Dose) 3 valent vaccine 01/17/2020     Influenza Vaccine 65+ (Fluzone HD) 09/27/2022     Influenza Vaccine, 6+MO IM (QUADRIVALENT W/PRESERVATIVES) 09/19/2014     Pneumo Conj 13-V (2010&after) 07/22/2015     Pneumococcal 20 valent Conjugate (Prevnar 20) 12/27/2022     Pneumococcal 23 valent 10/19/2016     TD (ADULT, 7+) 08/16/2019     Td,adult,historic,unspecified 03/11/2008     Tdap (Adacel,Boostrix) 03/11/2008     Zoster vaccine recombinant adjuvanted (SHINGRIX) 08/16/2019     Zoster vaccine, live 07/22/2015                 SIGNIFICANT IMAGING FINDINGS     Results for orders placed or performed during the hospital encounter of 02/08/23   XR Chest Port 1 View    Impression    IMPRESSION: Small right pleural effusion with some atelectasis in the right lung base has improved since 01/10/2023. Addition the appearance of the left lung base has improved as well now is clear.       SIGNIFICANT LABORATORY FINDINGS     Most Recent 3 BMP's:Recent Labs   Lab Test 02/09/23  0441 02/08/23  1816 02/08/23  0759 01/19/23  0740 01/19/23  0438     --  142  --  138   POTASSIUM 3.6  --  4.6  --  3.9   CHLORIDE 101  --  104  --  97*   CO2 31  --  25  --  33*   BUN 16  --  17  --  25   CR 0.68  --  0.70  --  0.62   ANIONGAP 9  --  13  --  8   JOEY 9.1  --  9.6  --  8.3*   GLC 96 72 119   < > 119    < > = values in this interval not displayed.           Discharge Orders        Reason for  your hospital stay    SVT     Activity    As tolerated     Follow-up and recommended labs and tests     The cardiac electrophysiology clinic will call you to set  Up a clinic time for follow up     Diet    Mediterranean heart healthy, diabetic       Examination   Physical Exam   Temp:  [97.6  F (36.4  C)-98.8  F (37.1  C)] 98.8  F (37.1  C)  Pulse:  [] 86  Resp:  [16-31] 16  BP: ()/(49-72) 104/59  SpO2:  [91 %-98 %] 93 %  Wt Readings from Last 1 Encounters:   02/09/23 67 kg (147 lb 11.3 oz)       General Appearance: Alert, smiling; on 2 liters oxygen  Respiratory: clear bilaterally  Cardiovascular: regular; wen lsb  GI: soft  Skin: no rashes        Please see EMR for more detailed significant labs, imaging, consultant notes etc.    IAngela MD, personally saw the patient today and spent less than or equal to 30 minutes discharging this patient.    Angela Kaiser MD  [facility]    CC:Cammy Bui

## 2023-02-09 NOTE — PLAN OF CARE
"Discharge instructions discussed with patient and patient at bedside; verbalized understanding. Discharging to home with son to transport. Patient is stable at the time of discharge. Pt uses home oxygen, RN requested her family bring in her portable O2 for discharge. Pt refused stating she live \"2 mins away\" and \"doesn't use it when leaving the house\". Son states he will have the O2 in the car for pt. Transported down to the car with hospital O2.   "

## 2023-02-12 ASSESSMENT — ENCOUNTER SYMPTOMS
FEVER: 0
ALLERGIC/IMMUNOLOGIC NEGATIVE: 1
MYALGIAS: 1
SHORTNESS OF BREATH: 1
ACTIVITY CHANGE: 0
PSYCHIATRIC NEGATIVE: 1
PALPITATIONS: 0
WEAKNESS: 1
POLYDIPSIA: 0
COUGH: 0
FATIGUE: 1
UNEXPECTED WEIGHT CHANGE: 0
ABDOMINAL PAIN: 0
BRUISES/BLEEDS EASILY: 1
BACK PAIN: 1
CHILLS: 0

## 2023-02-14 NOTE — TELEPHONE ENCOUNTER
I sent prescription for Dulera.  Patient did very well with Breo.    Has used Symbicort in past - wasn't as effective.    Will try Dulera. At this point.

## 2023-02-17 ENCOUNTER — PATIENT OUTREACH (OUTPATIENT)
Dept: GERIATRIC MEDICINE | Facility: CLINIC | Age: 73
End: 2023-02-17
Payer: COMMERCIAL

## 2023-02-19 DIAGNOSIS — E11.649 TYPE 2 DIABETES MELLITUS WITH HYPOGLYCEMIA WITHOUT COMA, WITH LONG-TERM CURRENT USE OF INSULIN (H): ICD-10-CM

## 2023-02-19 DIAGNOSIS — E11.9 TYPE 2 DIABETES MELLITUS (H): ICD-10-CM

## 2023-02-19 DIAGNOSIS — Z79.4 TYPE 2 DIABETES MELLITUS WITH HYPOGLYCEMIA WITHOUT COMA, WITH LONG-TERM CURRENT USE OF INSULIN (H): ICD-10-CM

## 2023-02-19 RX ORDER — GABAPENTIN 600 MG/1
TABLET ORAL
Qty: 90 TABLET | Refills: 4 | OUTPATIENT
Start: 2023-02-19

## 2023-02-19 NOTE — TELEPHONE ENCOUNTER
"Routing refill request to provider for review/approval because:  Labs not current:      Last Written Prescription Date:  9/9/22  Last Fill Quantity: 180,  # refills: 1   Last office visit provider:  2/6/23     Requested Prescriptions   Pending Prescriptions Disp Refills     metFORMIN (GLUCOPHAGE) 500 MG tablet [Pharmacy Med Name: METFORMIN 500MG TABLETS] 180 tablet 1     Sig: TAKE 1 TABLET(500 MG) BY MOUTH TWICE DAILY WITH MEALS       Biguanide Agents Failed - 2/19/2023  5:58 AM        Failed - Patient has documented A1c within the specified period of time.     If HgbA1C is 8 or greater, it needs to be on file within the past 3 months.  If less than 8, must be on file within the past 6 months.     Recent Labs   Lab Test 07/25/22  1026   A1C 5.8*             Passed - Patient is age 10 or older        Passed - Patient's CR is NOT>1.4 OR Patient's EGFR is NOT<45 within past 12 mos.     Recent Labs   Lab Test 02/09/23  0441 07/21/21  0911 11/11/20  1123   GFRESTIMATED >90   < > >60   GFRESTBLACK  --   --  >60    < > = values in this interval not displayed.       Recent Labs   Lab Test 02/09/23  0441   CR 0.68             Passed - Patient does NOT have a diagnosis of CHF.        Passed - Medication is active on med list        Passed - Patient is not pregnant        Passed - Patient has not had a positive pregnancy test within the past 12 mos.         Passed - Recent (6 mo) or future (30 days) visit within the authorizing provider's specialty     Patient had office visit in the last 6 months or has a visit in the next 30 days with authorizing provider or within the authorizing provider's specialty.  See \"Patient Info\" tab in inbasket, or \"Choose Columns\" in Meds & Orders section of the refill encounter.         Sanam Sánchez RN 02/19/23 4:26 PM  "
Statement Selected

## 2023-02-27 NOTE — PROGRESS NOTES
Per GWENDOLYN, CC notify    Mary Kay Tejada 1950 Bed Wedge 1/19/2023 Samantha NARAYAN  DELIVERED 2/21/2023      Esha [facility]  Care Management Specialist  [facility]  [phone]

## 2023-02-28 ENCOUNTER — TELEPHONE (OUTPATIENT)
Dept: RESPIRATORY THERAPY | Facility: CLINIC | Age: 73
End: 2023-02-28
Payer: COMMERCIAL

## 2023-02-28 NOTE — TELEPHONE ENCOUNTER
Spoke with Mary Kay, doing good, no questions for me to day. no issues getting and/or taking their medications, reviewed their action plan, in their green zone.  Reminded when we will call again and to call before if there is a question.  Luisa Matson, RT, CTTS, Chronic Pulmonary Disease Specialist

## 2023-03-01 ASSESSMENT — ACTIVITIES OF DAILY LIVING (ADL): DEPENDENT_IADLS:: CLEANING;COOKING;LAUNDRY;SHOPPING;MEAL PREPARATION;TRANSPORTATION

## 2023-03-02 NOTE — PROGRESS NOTES
[facility] Care Coordination Contact    [facility] Home Visit Assessment     Home visit for Health Risk Assessment with Mary Kay Tejada completed on August 16, 2022.    Type of residence:: Private home - no stairs  Current living arrangement:: I live in a private home with family     Assessment completed with:: Care Team Member, Patient    Current Care Plan  Member currently receiving the following home care services:     Member currently receiving the following community resources: County Worker, [facility], PCA, Housekeeping/Chore Agency      Medication Review  Medication reconciliation completed in Epic: No  Medication set-up & administration: Independent and sets up on own weekly.  Self-administers medications.  Medication Risk Assessment Medication (1 or more, place referral to MTM): N/A: No risk factors identified  MTM Referral Placed: No: No risk factors idenified    Mental/Behavioral Health   Depression Screening:   PHQ-2 Total Score (Adult) - Positive if 3 or more points; Administer PHQ-9 if positive: 0       Mental health DX:: Yes   Mental health DX how managed:: None    Falls Assessment:   Fallen 2 or more times in the past year?: No   Any fall with injury in the past year?: No    ADL/IADL Dependencies:   Dependent ADLs:: Bathing, Dressing, Grooming  Dependent IADLs:: Cleaning, Cooking, Laundry, Shopping, Meal Preparation, Transportation    [facility] Health Plan sponsored benefits: Shared information re: Silver Sneakers/gym memberships, ASA, Calcium +D.    PCA Assessment completed at visit: Yes Annual PCA assessment indicated 9 units per day of PCA. This is the same as the previous assessment.     Elderly Waiver Eligibility: Yes-will continue on EW    Care Plan & Recommendations: Member will continue to reside safely at home with PCA and homemaking services. Family will assist informally as needed. We will continue life alert.    She is due for a diabetic eye exam. Member will see PCP for a  referral.      See LTCC for detailed assessment information.    Follow-Up Plan: Member informed of future contact, plan to f/u with member with a 6 month telephone assessment.  Contact information shared with member and encouraged member to call with any questions or concerns at any time.    [facility] care continuum providers: Please see Snapshot and Care Management Flowsheets for Specific details of care plan.    Samantha Alexandra RN, PHN   [facility]  [phone]

## 2023-03-02 NOTE — PROGRESS NOTES
TRANSITIONS OF CARE (LETICIA) LOG   LETICIA tasks should be completed by the CC within one (1) business day of notification of each transition. Follow up contact with member is required after return to their usual care setting.  Note:  If CC finds out about the transitions fifteen (15) days or more after the member has returned to their usual care setting, no LETICIA log is needed. However, the CC should check in with the member to discuss the transition process, any changes needed to the care plan and document it in a case note.    Member Name:  Mary Kay Tejada [facility] Name:  [employer] Member ID#: [id number]   Product: [facility] Care Coordinator Contact:  Samantha Alexandra RN, PHN Agency/[facility]/Care System: [facility]   Transition Communication Actions from Care Management Contact   Transition #1   Notification Date: 2/9/23 Transition Date:   2/8/23 Transition From: Home     Is this the member s usual care setting?               yes Transition To: [facility]   Transition Type:  Unplanned  Reason for Admission/Comments:  Atrial fibrillation   Contact member/responsible party to offer assistance with transition Date completed: NA    Notes from conversation with the member/responsible party, provider, discharging and receiving facility (as applicable):   Date NA: Per chart review, member was in the ED observation then discharged to home.     Shared CC contact info, care plan/services with receiving setting--Date completed: NA   Name & Title of receiving setting contact: NA   Notified PCP of transition--Date completed:  2/9/23     via  EMR  Name of PCP: Cammy Bui     Transition #2   Notification Date: 2/9/23 Transition Date:   2/9/23 Transition From: [facility]     Is this the member s usual care setting?               no Transition To: Home   Transition Type:  Planned  Reason for Admission/Comments:  NA  Contact  member/responsible party to offer assistance with transition Date completed: See note below    Notes from conversation with the member/responsible party, provider, discharging and receiving facility (as applicable):   Date NA:     Shared CC contact info, care plan/services with receiving setting--Date completed: NA   Name & Title of receiving setting contact: NA   Notified PCP of transition--Date completed:  2/9/25/3     via  EMR  Name of PCP: Cammy Bui                                 *RETURN TO USUAL CARE SETTING: *Complete tasks below when the member is discharging TO their usual care setting within one (1) business day of notification..      For situations where the Care Coordinator is notified of the discharge prior to the date of discharge, the Care Coordinator must follow up with the member or designated representative to confirm that discharge actually occurred and discuss required LETICIA tasks as outlined in the LETICIA Instructions.  (This includes situations where it may be a  new  usual care setting for the member. (i.e., a community member who decides upon permanent nursing home placement following hospitalization and rehab).    Discuss with Member/Responsible Party:    Check  Yes  - if the member, family member and/or SNF/facility staff manages the following:    If  No  provide explanation in the comments section.          Date completed: 2/17/23 Communicated with member or their designated representative about the following:  care transition process; about changes to the member s health status; plan of care updates; education about transitions and how to prevent unplanned transitions/readmissions    Four Pillars for Optimal Transition:    Check  Yes  - if the member, family member and/or SNF/facility staff manages the following:    If  No  provide explanation in the comments section.          []  Yes     [x]  No Does the member have a follow-up appointment scheduled with primary care or  specialist? (Mental health hospitalizations--the appt. should be w/in 7 days)              For mental health hospitalizations:  []  Yes     []  No     Does the member have a follow-up appointment scheduled with a mental health practitioner within 7 days of discharge?  [x]  Yes     []  No     Has a medication review been completed with member? If no, refer to PCP, home care nurse, MTM, pharmacist  [x]  Yes     []  No     Can the member manage their medications or is there a system in place to manage medications (e.g. home care set-up)?         [x]  Yes     []  No     Can the member verbalize warning signs and symptoms to watch for and how to respond?  [x]  Yes     []  No     Does the member have a copy of and understand their discharge instructions?  If no, assist to obtain copy of discharge instructions, review discharge instructions, and assist to contact PCP to discuss questions about their recent hospitalization.  [x]  Yes     []  No     Does the member have adequate food, housing and transportation?  If no, add goal and discuss additional supports available to the member                                                                                                                                                                                 [x]  Yes     []  No     Is the member safe in their home?  If no, document needs and support provided                                                                                                                                                                          []  Yes     [x]  No     Are there any concerns of vulnerability, abuse, or neglect?  If yes, document concerns and actions taken by Care Coordinator as a mandated                                                                                                                                                                              [x]  Yes     []  No     Does the member use a Personal Health Care  Record?  Check  Yes  if visit summary, discharge summary, and/or healthcare summary are being used as a PHR.                                                                                                                                                                                  [x]  Yes     []  No     Have you reviewed the discharge summary with the member? If  No  provide explanation in comments.  [x]  Yes     []  No     Have you updated the member s care plan/support plan? Add new diagnosis, medications, treatments, goals & interventions, as applicable. If No, provide explanation in comments.    Comments:           Notes from conversation with the member/responsible party, provider, discharging and receiving facility (as applicable): CC contacted member and reviewed discharge summary.  Member has a follow-up appointment with PCP in 7 days: No: Offered Assistance with setting up a follow up appointment. member states has an appointment scheduled with PCP for March and follow up at that time.   Member has had a change in condition: No  Home visit needed: No  Care plan reviewed and updated.  The following home based services Homemaking PCA were resumed.  New referrals placed: No  Transition log completed.     Six month follow up completed.     Samantha Alexandra RN, PHN   [facility]  [phone]

## 2023-03-11 DIAGNOSIS — I47.10 SVT (SUPRAVENTRICULAR TACHYCARDIA) (H): ICD-10-CM

## 2023-03-11 RX ORDER — DILTIAZEM HYDROCHLORIDE 180 MG/1
CAPSULE, COATED, EXTENDED RELEASE ORAL
Qty: 90 CAPSULE | Refills: 3 | Status: ON HOLD | OUTPATIENT
Start: 2023-03-11 | End: 2023-06-05

## 2023-03-12 NOTE — TELEPHONE ENCOUNTER
"Last Written Prescription Date: 2/9/2023  Last Fill Quantity: 30,  # refills: 0  Last office visit provider:  2/6/2023 with PCP Dr NAYELI Bui    Requested Prescriptions   Pending Prescriptions Disp Refills     diltiazem ER COATED BEADS (CARDIZEM CD/CARTIA XT) 180 MG 24 hr capsule [Pharmacy Med Name: DILTIAZEM CD 180MG CAPSULES (24 HR)] 30 capsule 0     Sig: TAKE 1 CAPSULE(180 MG) BY MOUTH DAILY       Calcium Channel Blockers Protocol  Passed - 3/11/2023  9:26 AM        Passed - Blood pressure under 140/90 in past 12 months     BP Readings from Last 3 Encounters:   02/09/23 104/59   02/06/23 106/71   01/19/23 120/55                 Passed - Normal ALT in past 12 months     Recent Labs   Lab Test 01/15/23  0542   ALT 26             Passed - Recent (12 mo) or future (30 days) visit within the authorizing provider's specialty     Patient has had an office visit with the authorizing provider or a provider within the authorizing providers department within the previous 12 mos or has a future within next 30 days. See \"Patient Info\" tab in inbasket, or \"Choose Columns\" in Meds & Orders section of the refill encounter.              Passed - Medication is active on med list        Passed - Patient is age 18 or older        Passed - No active pregnancy on record        Passed - Normal serum creatinine on file in past 12 months     Recent Labs   Lab Test 02/09/23  0441   CR 0.68       Ok to refill medication if creatinine is low          Passed - No positive pregnancy test in past 12 months             Kami Khalil RN 03/11/23 10:00 PM  "

## 2023-03-13 DIAGNOSIS — M79.7 FIBROMYALGIA: ICD-10-CM

## 2023-03-13 DIAGNOSIS — G89.4 CHRONIC PAIN SYNDROME: ICD-10-CM

## 2023-03-13 NOTE — TELEPHONE ENCOUNTER
Patient was unable to come in for her Trigger Point injections today due to her ride not showing up, got her scheduled 3/21/23 for those to get done. The pt is wanting her pain medication sent in since she was unable to get here today, refill pending. Pt also said she was supposed to have bloodwork done, I only see an order for an Albumin to be done.

## 2023-03-14 RX ORDER — OXYCODONE HYDROCHLORIDE 10 MG/1
10 TABLET ORAL EVERY 6 HOURS PRN
Qty: 120 TABLET | Refills: 0 | Status: SHIPPED | OUTPATIENT
Start: 2023-03-14 | End: 2023-04-25

## 2023-03-14 NOTE — TELEPHONE ENCOUNTER
Pt calling back to check on the status of this, please send it to pharmacy on file if appropriate. Thanks!

## 2023-03-21 ENCOUNTER — OFFICE VISIT (OUTPATIENT)
Dept: FAMILY MEDICINE | Facility: CLINIC | Age: 73
End: 2023-03-21
Payer: COMMERCIAL

## 2023-03-21 ENCOUNTER — TELEPHONE (OUTPATIENT)
Dept: FAMILY MEDICINE | Facility: CLINIC | Age: 73
End: 2023-03-21

## 2023-03-21 VITALS
OXYGEN SATURATION: 82 % | HEART RATE: 98 BPM | DIASTOLIC BLOOD PRESSURE: 68 MMHG | SYSTOLIC BLOOD PRESSURE: 118 MMHG | RESPIRATION RATE: 20 BRPM

## 2023-03-21 DIAGNOSIS — J44.1 COPD EXACERBATION (H): ICD-10-CM

## 2023-03-21 DIAGNOSIS — Z79.4 TYPE 2 DIABETES MELLITUS WITH HYPOGLYCEMIA WITHOUT COMA, WITH LONG-TERM CURRENT USE OF INSULIN (H): Primary | ICD-10-CM

## 2023-03-21 DIAGNOSIS — Z79.899 ENCOUNTER FOR LONG-TERM (CURRENT) USE OF MEDICATIONS: ICD-10-CM

## 2023-03-21 DIAGNOSIS — E78.2 MIXED HYPERLIPIDEMIA: ICD-10-CM

## 2023-03-21 DIAGNOSIS — Z12.11 SCREEN FOR COLON CANCER: ICD-10-CM

## 2023-03-21 DIAGNOSIS — E87.6 HYPOPOTASSEMIA: ICD-10-CM

## 2023-03-21 DIAGNOSIS — I89.0 LYMPHEDEMA: ICD-10-CM

## 2023-03-21 DIAGNOSIS — Z79.4 TYPE 2 DIABETES MELLITUS WITH HYPOGLYCEMIA WITHOUT COMA, WITH LONG-TERM CURRENT USE OF INSULIN (H): ICD-10-CM

## 2023-03-21 DIAGNOSIS — E11.649 TYPE 2 DIABETES MELLITUS WITH HYPOGLYCEMIA WITHOUT COMA, WITH LONG-TERM CURRENT USE OF INSULIN (H): ICD-10-CM

## 2023-03-21 DIAGNOSIS — E11.42 DIABETIC POLYNEUROPATHY ASSOCIATED WITH TYPE 2 DIABETES MELLITUS (H): ICD-10-CM

## 2023-03-21 DIAGNOSIS — J43.9 PULMONARY EMPHYSEMA, UNSPECIFIED EMPHYSEMA TYPE (H): ICD-10-CM

## 2023-03-21 DIAGNOSIS — M79.7 FIBROMYALGIA: ICD-10-CM

## 2023-03-21 DIAGNOSIS — D50.0 IRON DEFICIENCY ANEMIA DUE TO CHRONIC BLOOD LOSS: ICD-10-CM

## 2023-03-21 DIAGNOSIS — J96.22 ACUTE AND CHRONIC RESPIRATORY FAILURE WITH HYPERCAPNIA (H): ICD-10-CM

## 2023-03-21 DIAGNOSIS — Z12.31 VISIT FOR SCREENING MAMMOGRAM: ICD-10-CM

## 2023-03-21 DIAGNOSIS — E11.649 TYPE 2 DIABETES MELLITUS WITH HYPOGLYCEMIA WITHOUT COMA, WITH LONG-TERM CURRENT USE OF INSULIN (H): Primary | ICD-10-CM

## 2023-03-21 DIAGNOSIS — M54.6 TRIGGER POINT OF THORACIC REGION: Primary | ICD-10-CM

## 2023-03-21 DIAGNOSIS — J44.1 COPD EXACERBATION (H): Primary | ICD-10-CM

## 2023-03-21 DIAGNOSIS — I48.91 ATRIAL FIBRILLATION, UNSPECIFIED TYPE (H): ICD-10-CM

## 2023-03-21 PROCEDURE — 20553 NJX 1/MLT TRIGGER POINTS 3/>: CPT | Performed by: FAMILY MEDICINE

## 2023-03-21 PROCEDURE — 99214 OFFICE O/P EST MOD 30 MIN: CPT | Mod: 25 | Performed by: FAMILY MEDICINE

## 2023-03-21 RX ORDER — GABAPENTIN 600 MG/1
600 TABLET ORAL 3 TIMES DAILY
Qty: 270 TABLET | Refills: 2 | Status: SHIPPED | OUTPATIENT
Start: 2023-03-21 | End: 2023-12-17

## 2023-03-21 RX ORDER — FLUTICASONE FUROATE AND VILANTEROL 200; 25 UG/1; UG/1
1 POWDER RESPIRATORY (INHALATION) DAILY
Qty: 90 EACH | Refills: 2 | Status: ON HOLD | OUTPATIENT
Start: 2023-03-21 | End: 2023-06-05

## 2023-03-21 RX ORDER — FUROSEMIDE 40 MG
40 TABLET ORAL
Qty: 180 TABLET | Refills: 2 | Status: ON HOLD | OUTPATIENT
Start: 2023-03-21 | End: 2023-06-05

## 2023-03-21 NOTE — PROGRESS NOTES
1. Trigger point of thoracic region  11.  Fibromyalgia  See procedure note - patient has trigger point injections for fibromyalgia -     - lidocaine 1 % 10 mL    [facility]    Procedure: MSK: Inject Trigger Point, 1 or 2    Date/Time: 3/21/2023 1:50 PM  Performed by: Cammy Bui MD  Authorized by: Cammy Bui MD       UNIVERSAL PROTOCOL   Site Marked: Yes  Prior Images Obtained and Reviewed:  NA  Required items: Required blood products, implants, devices and special equipment available (NA)    Patient identity confirmed:  Verbally with patient  NA - No sedation, light sedation, or local anesthesia  Confirmation Checklist:  Correct equipment/implants were available  Time out: Immediately prior to the procedure a time out was called    Universal Protocol: the Joint Commission Universal Protocol was followed    Preparation: Patient was prepped and draped in usual sterile fashion      SEDATION    Patient Sedated: No      PROCEDURE  Describe Procedure: Total of 10 ml Lidocaine 1% no epi was injected - spread between 6 trigger points (1.6 ml each); injected points at lower cervical paraspinal muscles and suprascapular  Patient Tolerance:  Patient tolerated the procedure well with no immediate complications  Length of time physician/provider present for 1:1 monitoring during sedation: 0        2. Diabetic polyneuropathy associated with type 2 diabetes mellitus (H)  Patient has type II DM - needs labs - ordered - patient will do it next visit - declines today     3. Type 2 diabetes mellitus with hypoglycemia without coma, with long-term current use of insulin (H)  Neuropathic pain related to diabetes - will continue Gabapentin for pain -   - gabapentin (NEURONTIN) 600 MG tablet; Take 1 tablet (600 mg) by mouth 3 times daily  Dispense: 270 tablet; Refill: 2  - Albumin Random Urine Quantitative with Creat Ratio; Future    4. COPD exacerbation (H)  H/o COPD -  recent hospitalization - more stable - needs inhalers refilled   - fluticasone-vilanterol (BREO ELLIPTA) 200-25 MCG/ACT inhaler; Inhale 1 puff into the lungs daily  Dispense: 90 each; Refill: 2    5. Atrial fibrillation, unspecified type (H)  No longer on warfarin therapy - doesn' tneed INRs any more; now on Apixaban   - apixaban ANTICOAGULANT (ELIQUIS) 5 MG tablet; Take 1 tablet (5 mg) by mouth 2 times daily  Dispense: 180 tablet; Refill: 2    6. Acute and chronic respiratory failure with hypercapnia (H)  H/o respiratory failure - mixed pulmonary/cardiac components  - furosemide (LASIX) 40 MG tablet; Take 1 tablet (40 mg) by mouth 2 times daily  Dispense: 180 tablet; Refill: 2    7. Lymphedema  H/o lymphedema - I think she'd benefit from compression - ordered stockings   - Compression Sleeve/Stocking Order for DME - ONLY FOR DME    8. Encounter for long-term (current) use of medications  Reviewed medications today -     9. Visit for screening mammogram  Due for breast cancer screening - ordered mammogram   - MA SCREENING DIGITAL BILAT - Future  (s+30); Future    10. Screen for colon cancer  Due for colonoscopy - will review - has had EGD recently, but it's not clear if she's had colonoscopy -       Carol Muñiz is a 73 year old, presenting for the following health issues:  Trigger Point Injection      Here for trigger point injections -   Still using opiates for pain - will continue to prescribe as needed (limited monthly supply)  Continue her trigger point injections to maintain other pain medications at lowest doses possible - can't use NSAIDs due to GI bleeding.     Needs blood work - declines today - will do this when she comes next month    History of Present Illness       Reason for visit:  Trigger Point Injections              Review of Systems   Constitutional: Positive for activity change, appetite change, fatigue and unexpected weight change. Negative for chills and fever.   HENT: Negative.     Eyes: Negative for visual disturbance.   Respiratory: Negative for cough and shortness of breath.    Cardiovascular: Positive for peripheral edema. Negative for chest pain.   Gastrointestinal: Negative for abdominal pain and hematochezia.   Endocrine: Negative for polydipsia and polyuria.   Genitourinary: Negative for dysuria.   Musculoskeletal: Positive for arthralgias, myalgias and neck pain.   Skin: Negative.    Allergic/Immunologic: Negative.    Neurological: Positive for weakness (general).   Hematological: Does not bruise/bleed easily.   Psychiatric/Behavioral: Positive for mood changes.   All other systems reviewed and are negative.           Objective    /68 (BP Location: Right arm, Patient Position: Sitting, Cuff Size: Adult Regular)   Pulse 98   Resp 20   SpO2 (!) 82%   There is no height or weight on file to calculate BMI.  Physical Exam  Vitals reviewed.   Constitutional:       General: She is not in acute distress.     Appearance: Normal appearance. She is ill-appearing (chronically ill appearing - looks tired/weak).   HENT:      Head: Normocephalic.      Right Ear: Tympanic membrane, ear canal and external ear normal.      Left Ear: Tympanic membrane, ear canal and external ear normal.      Nose: Nose normal.      Mouth/Throat:      Mouth: Mucous membranes are moist.      Pharynx: No posterior oropharyngeal erythema.   Eyes:      Extraocular Movements: Extraocular movements intact.      Conjunctiva/sclera: Conjunctivae normal.      Pupils: Pupils are equal, round, and reactive to light.   Cardiovascular:      Rate and Rhythm: Normal rate and regular rhythm.      Pulses: Normal pulses.      Heart sounds: Normal heart sounds. No murmur heard.  Pulmonary:      Effort: Pulmonary effort is normal.      Breath sounds: Normal breath sounds.   Abdominal:      Palpations: Abdomen is soft. There is no mass.      Tenderness: There is no abdominal tenderness. There is no guarding or rebound.    Musculoskeletal:         General: Tenderness (trigger points - worst at upper back, lower neck) present. No deformity. Normal range of motion.      Cervical back: Normal range of motion and neck supple.      Right lower leg: Edema present.      Left lower leg: Edema present.   Lymphadenopathy:      Cervical: No cervical adenopathy.   Skin:     General: Skin is warm and dry.   Neurological:      General: No focal deficit present.      Mental Status: She is alert.   Psychiatric:         Mood and Affect: Mood normal.         Behavior: Behavior normal.            No results found for any visits on 03/21/23.

## 2023-03-21 NOTE — TELEPHONE ENCOUNTER
The pt's insurance does not cover Breo-Ellipta, would you like to start a PA or send in an alternative medication?    Covered alternatives are:    -Dulera  -Symbicort

## 2023-03-22 ENCOUNTER — TELEPHONE (OUTPATIENT)
Dept: FAMILY MEDICINE | Facility: CLINIC | Age: 73
End: 2023-03-22
Payer: COMMERCIAL

## 2023-03-22 RX ORDER — BUDESONIDE AND FORMOTEROL FUMARATE DIHYDRATE 160; 4.5 UG/1; UG/1
2 AEROSOL RESPIRATORY (INHALATION) 2 TIMES DAILY
Qty: 10.2 G | Refills: 11 | Status: SHIPPED | OUTPATIENT
Start: 2023-03-22 | End: 2023-05-24

## 2023-03-22 NOTE — TELEPHONE ENCOUNTER
General Call    Contacts       Type Contact Phone/Fax    03/22/2023 11:16 AM CDT Phone (Incoming) Mary Kay Tejada (Self) [phone] (M)        Reason for Call:  Socks    What are your questions or concerns:  Patient was calling clinic because she received a call about needing to be measured for socks that the provider put an order in for her. Patient stated that she does not have transportation support that can have her going back and forth to the clinic, but she believed that provider said that she would be a small. Please advise if patient  needs to come into the clinic to be measured and she will try to find a way to come in, but would like it to be know that she doesn't have the most reliable transportation at the moment.     Date of last appointment with provider: 3/21/2023    Could we send this information to you in [facility] or would you prefer to receive a phone call?:   Patient would prefer a phone call   Okay to leave a detailed message?: Yes at Cell number on file:    Telephone Information:   Mobile [phone]

## 2023-03-23 NOTE — TELEPHONE ENCOUNTER
I do think patient could wear a small compression stocking.  They could dispense 1 pair of small socks.

## 2023-03-23 NOTE — TELEPHONE ENCOUNTER
Called pt and relayed Dr. Brizuela's message. Pt asked if they would send them to her. Writer is not sure and advise that pt should call the people that called her to ask about this. Pt stated she called them and they told her it was already taken care of on Tuesday.    Graham House Cem Say, BSN RN  [facility]

## 2023-03-26 ASSESSMENT — ENCOUNTER SYMPTOMS
POLYDIPSIA: 0
APPETITE CHANGE: 1
HEMATOCHEZIA: 0
ALLERGIC/IMMUNOLOGIC NEGATIVE: 1
BRUISES/BLEEDS EASILY: 0
ABDOMINAL PAIN: 0
FATIGUE: 1
DYSURIA: 0
CHILLS: 0
MYALGIAS: 1
COUGH: 0
SHORTNESS OF BREATH: 0
UNEXPECTED WEIGHT CHANGE: 1
ACTIVITY CHANGE: 1
ARTHRALGIAS: 1
FEVER: 0
WEAKNESS: 1
NECK PAIN: 1

## 2023-04-03 ENCOUNTER — TELEPHONE (OUTPATIENT)
Dept: RESPIRATORY THERAPY | Facility: CLINIC | Age: 73
End: 2023-04-03
Payer: COMMERCIAL

## 2023-04-03 NOTE — TELEPHONE ENCOUNTER
Left message for them call back with any questions they may have, our phone number, reminders, congratulated her on making 1 year out for her breathing and let her know we will not call on a regular basis but are still her to answer her questions or help her with concerns.  Luisa Matson, RT, Chronic Pulmonary Disease Specialist

## 2023-04-20 DIAGNOSIS — E11.649 TYPE 2 DIABETES MELLITUS WITH HYPOGLYCEMIA WITHOUT COMA, WITHOUT LONG-TERM CURRENT USE OF INSULIN (H): ICD-10-CM

## 2023-04-20 RX ORDER — BLOOD SUGAR DIAGNOSTIC
STRIP MISCELLANEOUS
Qty: 300 STRIP | Refills: 1 | Status: SHIPPED | OUTPATIENT
Start: 2023-04-20 | End: 2023-10-17

## 2023-04-20 NOTE — TELEPHONE ENCOUNTER
"Last Written Prescription Date:  10/9/2022  Last Fill Quantity: 300,  # refills: 1   Last office visit provider:  3/21/2023     Requested Prescriptions   Pending Prescriptions Disp Refills     ONETOUCH VERIO IQ test strip [Pharmacy Med Name: ONE TOUCH VERIO TEST ST(NEW)100S] 300 strip 1     Sig: USE TO TEST THREE TIMES DAILY       Diabetic Supplies Protocol Passed - 4/20/2023  5:59 AM        Passed - Medication is active on med list        Passed - Patient is 18 years of age or older        Passed - Recent (6 mo) or future (30 days) visit within the authorizing provider's specialty     Patient had office visit in the last 6 months or has a visit in the next 30 days with authorizing provider.  See \"Patient Info\" tab in inbasket, or \"Choose Columns\" in Meds & Orders section of the refill encounter.                 Sanam Whitaker RN 04/20/23 5:15 PM  "

## 2023-04-25 ENCOUNTER — OFFICE VISIT (OUTPATIENT)
Dept: FAMILY MEDICINE | Facility: CLINIC | Age: 73
End: 2023-04-25
Payer: COMMERCIAL

## 2023-04-25 VITALS
DIASTOLIC BLOOD PRESSURE: 64 MMHG | HEART RATE: 92 BPM | OXYGEN SATURATION: 87 % | TEMPERATURE: 97.5 F | RESPIRATION RATE: 18 BRPM | SYSTOLIC BLOOD PRESSURE: 121 MMHG

## 2023-04-25 DIAGNOSIS — M79.7 FIBROMYALGIA: ICD-10-CM

## 2023-04-25 DIAGNOSIS — G89.4 CHRONIC PAIN SYNDROME: ICD-10-CM

## 2023-04-25 DIAGNOSIS — M54.6 TRIGGER POINT OF THORACIC REGION: Primary | ICD-10-CM

## 2023-04-25 DIAGNOSIS — F17.210 CIGARETTE NICOTINE DEPENDENCE WITHOUT COMPLICATION: ICD-10-CM

## 2023-04-25 DIAGNOSIS — E11.42 DIABETIC POLYNEUROPATHY ASSOCIATED WITH TYPE 2 DIABETES MELLITUS (H): ICD-10-CM

## 2023-04-25 DIAGNOSIS — J44.1 COPD EXACERBATION (H): ICD-10-CM

## 2023-04-25 DIAGNOSIS — I89.0 LYMPHEDEMA: ICD-10-CM

## 2023-04-25 DIAGNOSIS — Z12.11 SCREEN FOR COLON CANCER: ICD-10-CM

## 2023-04-25 PROCEDURE — 20553 NJX 1/MLT TRIGGER POINTS 3/>: CPT | Performed by: FAMILY MEDICINE

## 2023-04-25 PROCEDURE — 99214 OFFICE O/P EST MOD 30 MIN: CPT | Mod: 25 | Performed by: FAMILY MEDICINE

## 2023-04-25 RX ORDER — OXYCODONE HYDROCHLORIDE 10 MG/1
10 TABLET ORAL EVERY 6 HOURS PRN
Qty: 120 TABLET | Refills: 0 | Status: SHIPPED | OUTPATIENT
Start: 2023-04-25 | End: 2023-06-13

## 2023-04-25 NOTE — PROGRESS NOTES
1. Trigger point of thoracic region  See procedure note below.  Patient uses trigger points injections to control pain without increasing opiate use.    - lidocaine 1 % 10 mL    2. Fibromyalgia  3. Chronic pain syndrome  Patient continues to use Oxycodone - no increasing use  - oxyCODONE IR (ROXICODONE) 10 MG tablet; Take 1 tablet (10 mg) by mouth every 6 hours as needed for moderate to severe pain  Dispense: 120 tablet; Refill: 0    4. COPD exacerbation (H)  COPD exacerbation - continues to smoke - discussed again that she needs to stop smoking - continues to put self at risk - has had multiple hospitalizations for respiratory illness.      5. Cigarette nicotine dependence without complication  As above - needs to try to stop smoking - will try Nicotine inhaler again   - nicotine (NICOTROL) 10 MG inhaler; Use 1 cartridge as needed for urge to smoke by puffing over course of 20min.  Use 6-16 cart/day; reduce number of cart/day over 6-12 weeks.  Dispense: 168 each; Refill: 1    6. Diabetic polyneuropathy associated with type 2 diabetes mellitus (H)  Type II DM - patient needs to get labs done - I have written for these - she did not get these done today - refuses - will do this next visit.      7. Screen for colon cancer  Due for colon cancer screening -     [facility]    Procedure: MSK: Inject Trigger Points, >3    Date/Time: 4/25/2023 5:38 PM    Performed by: Cammy Bui MD  Authorized by: Cammy Bui MD      UNIVERSAL PROTOCOL   Site Marked: Yes  Prior Images Obtained and Reviewed:  NA  Required items: Required blood products, implants, devices and special equipment available    Patient identity confirmed:  Verbally with patient  NA - No sedation, light sedation, or local anesthesia  Confirmation Checklist:  Patient's identity using two indicators  Time out: Immediately prior to the procedure a time out was called    Universal Protocol: the Joint  Commission Universal Protocol was followed    Preparation: Patient was prepped and draped in usual sterile fashion       ANESTHESIA    Anesthesia: Local infiltration  Local Anesthetic:  Lidocaine 1% without epinephrine  Anesthetic Total (mL):  10      SEDATION    Patient Sedated: No      PROCEDURE  Describe Procedure: 6 trigger points (4 right, 2 left) injected today for 1.6 ml each - lower cervical/suprascapular  Patient Tolerance:  Patient tolerated the procedure well with no immediate complications  Length of time physician/provider present for 1:1 monitoring during sedation: 0        Carol Muñiz is a 73 year old, presenting for the following health issues:  Trigger Point Injection        4/25/2023     4:55 PM   Additional Questions   Roomed by Mimi     Measured legs - right and left are equal  Posterior knee crease to arch of foot = 39 cm  Calf= 31 cm  Ankle = 21 cm      History of Present Illness       Reason for visit:  Trigger Point Injections                Review of Systems   Constitutional: Positive for unexpected weight change (continues to lose weight). Negative for chills and fever.   HENT: Negative.    Eyes: Negative for visual disturbance.   Respiratory: Positive for cough, shortness of breath (chronic - no change) and wheezing.    Cardiovascular: Positive for peripheral edema. Negative for chest pain.   Gastrointestinal: Positive for abdominal pain. Negative for hematochezia.   Endocrine: Negative for polydipsia and polyuria.   Genitourinary: Negative.    Musculoskeletal: Positive for arthralgias, back pain and myalgias.   Skin: Negative.    Neurological: Negative.    Hematological: Does not bruise/bleed easily.   Psychiatric/Behavioral: Positive for mood changes. The patient is nervous/anxious.    All other systems reviewed and are negative.           Objective    /64   Pulse 92   Temp 97.5  F (36.4  C)   Resp 18   SpO2 (!) 87%   There is no height or weight on file to  calculate BMI.  Physical Exam  Vitals reviewed.   Constitutional:       General: She is not in acute distress.     Appearance: Normal appearance. She is ill-appearing (chronically ill appearing - malnourished appearing).   HENT:      Head: Normocephalic.      Right Ear: Tympanic membrane, ear canal and external ear normal.      Left Ear: Tympanic membrane, ear canal and external ear normal.      Nose: Nose normal.      Mouth/Throat:      Mouth: Mucous membranes are moist.      Pharynx: No posterior oropharyngeal erythema.   Eyes:      Extraocular Movements: Extraocular movements intact.      Conjunctiva/sclera: Conjunctivae normal.      Pupils: Pupils are equal, round, and reactive to light.   Cardiovascular:      Rate and Rhythm: Normal rate and regular rhythm.      Pulses: Normal pulses.      Heart sounds: Normal heart sounds. No murmur heard.  Pulmonary:      Effort: Pulmonary effort is normal. No respiratory distress.      Breath sounds: Wheezing present.   Abdominal:      Palpations: Abdomen is soft. There is no mass.      Tenderness: There is no abdominal tenderness. There is no guarding or rebound.   Musculoskeletal:         General: No deformity. Normal range of motion.      Cervical back: Normal range of motion and neck supple.      Comments: Muscle tenderness     Lymphadenopathy:      Cervical: No cervical adenopathy.   Skin:     General: Skin is warm and dry.   Neurological:      General: No focal deficit present.      Mental Status: She is alert.   Psychiatric:         Mood and Affect: Mood normal.         Behavior: Behavior normal.            No results found for any visits on 04/25/23.

## 2023-04-30 ASSESSMENT — ENCOUNTER SYMPTOMS
BACK PAIN: 1
FEVER: 0
BRUISES/BLEEDS EASILY: 0
POLYDIPSIA: 0
ABDOMINAL PAIN: 1
WHEEZING: 1
HEMATOCHEZIA: 0
COUGH: 1
SHORTNESS OF BREATH: 1
ARTHRALGIAS: 1
CHILLS: 0
NERVOUS/ANXIOUS: 1
MYALGIAS: 1
NEUROLOGICAL NEGATIVE: 1
UNEXPECTED WEIGHT CHANGE: 1

## 2023-05-01 ENCOUNTER — NURSE TRIAGE (OUTPATIENT)
Dept: FAMILY MEDICINE | Facility: CLINIC | Age: 73
End: 2023-05-01
Payer: COMMERCIAL

## 2023-05-01 DIAGNOSIS — J44.1 COPD EXACERBATION (H): Primary | ICD-10-CM

## 2023-05-01 RX ORDER — CEFDINIR 300 MG/1
300 CAPSULE ORAL 2 TIMES DAILY
Qty: 20 CAPSULE | Refills: 0 | Status: SHIPPED | OUTPATIENT
Start: 2023-05-01 | End: 2023-05-24

## 2023-05-01 RX ORDER — CODEINE PHOSPHATE AND GUAIFENESIN 10; 100 MG/5ML; MG/5ML
1-2 SOLUTION ORAL EVERY 4 HOURS PRN
Qty: 120 ML | Refills: 0 | Status: SHIPPED | OUTPATIENT
Start: 2023-05-01 | End: 2023-09-24

## 2023-05-01 RX ORDER — PREDNISONE 20 MG/1
40 TABLET ORAL DAILY
Qty: 10 TABLET | Refills: 0 | Status: SHIPPED | OUTPATIENT
Start: 2023-05-01 | End: 2023-05-06

## 2023-05-01 NOTE — TELEPHONE ENCOUNTER
Provider Response to 2nd Level Triage Request    I have reviewed the RN documentation. My recommendation is:  I will send in prescriptions.  If not getting better, will need to be seen.

## 2023-05-01 NOTE — TELEPHONE ENCOUNTER
New Medication Request    Contacts       Type Contact Phone/Fax    05/01/2023 10:08 AM CDT Phone (Incoming) Mary Kay Tejada (Self) [phone] (M)          What medication are you requesting?: Antibiotic and cough medication    Reason for medication request:  Pt asked if she could have a cough medicine and antibiotic because she feels sick and she can feel it in her left lung.    Have you taken this medication before?: No    Controlled Substance Agreement on file:   CSA -- Patient Level:     [Media Unavailable] Controlled Substance Agreement - Opioid - Scan on 12/16/2015         Patient offered an appointment? No    Preferred Pharmacy:    [id number] - [place], [place] - [address] AT [facility] [address]  [address]  [place] [address]  Phone: [phone] Fax: [phone]      Could we send this information to you in [facility] or would you prefer to receive a phone call?:   Patient would prefer a phone call     Okay to leave a detailed message?: Yes at Home number on file [phone] (home)

## 2023-05-01 NOTE — TELEPHONE ENCOUNTER
"Nurse Triage SBAR    Is this a 2nd Level Triage? NO    Situation: cough x 2 days    Background: h/o COPD with multiple hospitalizations for respiratory illnesses/COPD exacerbations. Has had pneumonia several times in the past and states symptoms feel similar.    Assessment: productive cough x 2 days associated with chest/upper back soreness present when coughing. Sputum is usually green but occasionally blood-tinged. Denies fever. Respiratory status at baseline - no breathing difficulty worse than normal, using home O2 and inhalers but not needing any more than usual at this point. O2 sats at baseline: \"high 80s to low 90s\" per patient    Patient states \"I know my lungs are infected. Dr. Brizuela usually prescribes me antibiotics to prevent me from ending up in the hospital.\"     Protocol Recommended Disposition:   See in Office Today    Recommendation: patient is requesting antibiotics, prednisone, and cough medicine. Declining to come in for eval as she was just in clinic and would need to arrange transportation. States her symptoms are fairly mild at this point but would like treatment to prevent worsening infection and/or hospitalization.      Routed to provider    Does the patient meet one of the following criteria for ADS visit consideration? 16+ years old, with an [facility] PCP     TIP  Providers, please consider if this condition is appropriate for management at one of our Acute and Diagnostic Services sites.     If patient is a good candidate, please use dotphrase <dot>triageresponse and select Refer to ADS to document.      Reason for Disposition    Coughing up katherine-colored (reddish-brown) or blood-tinged sputum    Additional Information    Negative: Chest pain present when not coughing    Negative: MODERATE difficulty breathing (e.g., speaks in phrases, SOB even at rest, pulse 100-120) and still present when not coughing    Negative: Passed out (i.e., fainted, collapsed and was not responding)    Negative: " MILD difficulty breathing (e.g., minimal/no SOB at rest, SOB with walking, pulse <100) and still present when not coughing    Negative: Coughed up > 1 tablespoon (15 ml) blood (Exception: Blood-tinged sputum.)    Negative: Fever > 103 F (39.4 C)    Negative: Fever > 101 F (38.3 C) and over 60 years of age    Negative: Fever > 100.0 F (37.8 C) and has diabetes mellitus or a weak immune system (e.g., HIV positive, cancer chemotherapy, organ transplant, splenectomy, chronic steroids)    Negative: Fever > 100.0 F (37.8 C) and bedridden (e.g., nursing home patient, stroke, chronic illness, recovering from surgery)    Negative: Increasing ankle swelling    Negative: Wheezing is present    Negative: SEVERE coughing spells (e.g., whooping sound after coughing, vomiting after coughing)    Protocols used: COUGH-A-OH    Brisa Pizarro RN BSN  [facility]

## 2023-05-02 NOTE — TELEPHONE ENCOUNTER
Called patient and relayed message. She had already picked up medications and will start today. Understands to call back if symptoms worsen.

## 2023-05-13 ENCOUNTER — HEALTH MAINTENANCE LETTER (OUTPATIENT)
Age: 73
End: 2023-05-13

## 2023-05-18 ENCOUNTER — PATIENT OUTREACH (OUTPATIENT)
Dept: GERIATRIC MEDICINE | Facility: CLINIC | Age: 73
End: 2023-05-18
Payer: COMMERCIAL

## 2023-05-18 NOTE — PROGRESS NOTES
[facility] Care Coordination Contact    Returned member's call, no answer and left voice mail.    Samantha Alexandra RN, PHN   [facility]  [phone]

## 2023-05-24 ENCOUNTER — APPOINTMENT (OUTPATIENT)
Dept: RADIOLOGY | Facility: CLINIC | Age: 73
DRG: 308 | End: 2023-05-24
Attending: EMERGENCY MEDICINE
Payer: COMMERCIAL

## 2023-05-24 ENCOUNTER — APPOINTMENT (OUTPATIENT)
Dept: ULTRASOUND IMAGING | Facility: CLINIC | Age: 73
DRG: 308 | End: 2023-05-24
Attending: EMERGENCY MEDICINE
Payer: COMMERCIAL

## 2023-05-24 ENCOUNTER — HOSPITAL ENCOUNTER (INPATIENT)
Facility: CLINIC | Age: 73
LOS: 12 days | Discharge: HOME HEALTH | DRG: 308 | End: 2023-06-05
Attending: EMERGENCY MEDICINE | Admitting: STUDENT IN AN ORGANIZED HEALTH CARE EDUCATION/TRAINING PROGRAM
Payer: COMMERCIAL

## 2023-05-24 DIAGNOSIS — J96.22 ACUTE AND CHRONIC RESPIRATORY FAILURE WITH HYPERCAPNIA (H): ICD-10-CM

## 2023-05-24 DIAGNOSIS — I50.33 ACUTE ON CHRONIC DIASTOLIC HEART FAILURE (H): ICD-10-CM

## 2023-05-24 DIAGNOSIS — I48.92 ATRIAL FLUTTER, UNSPECIFIED TYPE (H): ICD-10-CM

## 2023-05-24 DIAGNOSIS — J18.9 COMMUNITY ACQUIRED PNEUMONIA, UNSPECIFIED LATERALITY: ICD-10-CM

## 2023-05-24 DIAGNOSIS — I48.91 ATRIAL FIBRILLATION, UNSPECIFIED TYPE (H): ICD-10-CM

## 2023-05-24 DIAGNOSIS — J18.9 PNEUMONIA OF RIGHT LOWER LOBE DUE TO INFECTIOUS ORGANISM: ICD-10-CM

## 2023-05-24 DIAGNOSIS — M71.20 SYNOVIAL CYST OF KNEE, UNSPECIFIED LATERALITY: ICD-10-CM

## 2023-05-24 DIAGNOSIS — R09.02 HYPOXIA: Primary | ICD-10-CM

## 2023-05-24 DIAGNOSIS — E11.649 TYPE 2 DIABETES MELLITUS WITH HYPOGLYCEMIA WITHOUT COMA, WITHOUT LONG-TERM CURRENT USE OF INSULIN (H): ICD-10-CM

## 2023-05-24 DIAGNOSIS — R06.02 SOB (SHORTNESS OF BREATH): ICD-10-CM

## 2023-05-24 DIAGNOSIS — J44.1 COPD EXACERBATION (H): ICD-10-CM

## 2023-05-24 DIAGNOSIS — I48.0 PAROXYSMAL ATRIAL FIBRILLATION (H): ICD-10-CM

## 2023-05-24 LAB
ALBUMIN SERPL-MCNC: 3.1 G/DL (ref 3.5–5)
ALP SERPL-CCNC: 129 U/L (ref 45–120)
ALT SERPL W P-5'-P-CCNC: 35 U/L (ref 0–45)
ANION GAP SERPL CALCULATED.3IONS-SCNC: 15 MMOL/L (ref 5–18)
AST SERPL W P-5'-P-CCNC: 34 U/L (ref 0–40)
ATRIAL RATE - MUSE: 344 BPM
ATRIAL RATE - MUSE: 352 BPM
BASOPHILS # BLD AUTO: 0 10E3/UL (ref 0–0.2)
BASOPHILS NFR BLD AUTO: 0 %
BILIRUB DIRECT SERPL-MCNC: 0.5 MG/DL
BILIRUB SERPL-MCNC: 1.3 MG/DL (ref 0–1)
BNP SERPL-MCNC: 176 PG/ML (ref 0–130)
BUN SERPL-MCNC: 24 MG/DL (ref 8–28)
CALCIUM SERPL-MCNC: 8.9 MG/DL (ref 8.5–10.5)
CHLORIDE BLD-SCNC: 97 MMOL/L (ref 98–107)
CO2 SERPL-SCNC: 28 MMOL/L (ref 22–31)
CREAT SERPL-MCNC: 0.77 MG/DL (ref 0.6–1.1)
DIASTOLIC BLOOD PRESSURE - MUSE: 63 MMHG
DIASTOLIC BLOOD PRESSURE - MUSE: NORMAL MMHG
EOSINOPHIL # BLD AUTO: 0 10E3/UL (ref 0–0.7)
EOSINOPHIL NFR BLD AUTO: 0 %
ERYTHROCYTE [DISTWIDTH] IN BLOOD BY AUTOMATED COUNT: 21.7 % (ref 10–15)
FLUAV RNA SPEC QL NAA+PROBE: NEGATIVE
FLUBV RNA RESP QL NAA+PROBE: NEGATIVE
GFR SERPL CREATININE-BSD FRML MDRD: 81 ML/MIN/1.73M2
GLUCOSE BLD-MCNC: 117 MG/DL (ref 70–125)
GLUCOSE BLDC GLUCOMTR-MCNC: 209 MG/DL (ref 70–99)
GLUCOSE BLDC GLUCOMTR-MCNC: 355 MG/DL (ref 70–99)
HCT VFR BLD AUTO: 28.5 % (ref 35–47)
HGB BLD-MCNC: 7.3 G/DL (ref 11.7–15.7)
HGB BLD-MCNC: 7.5 G/DL (ref 11.7–15.7)
HGB BLD-MCNC: 7.9 G/DL (ref 11.7–15.7)
HOLD SPECIMEN: NORMAL
HOLD SPECIMEN: NORMAL
IMM GRANULOCYTES # BLD: 0.1 10E3/UL
IMM GRANULOCYTES NFR BLD: 1 %
INTERPRETATION ECG - MUSE: NORMAL
INTERPRETATION ECG - MUSE: NORMAL
LACTATE SERPL-SCNC: 1.3 MMOL/L (ref 0.7–2)
LIPASE SERPL-CCNC: 11 U/L (ref 0–52)
LYMPHOCYTES # BLD AUTO: 0.6 10E3/UL (ref 0.8–5.3)
LYMPHOCYTES NFR BLD AUTO: 7 %
MAGNESIUM SERPL-MCNC: 1.5 MG/DL (ref 1.8–2.6)
MCH RBC QN AUTO: 20 PG (ref 26.5–33)
MCHC RBC AUTO-ENTMCNC: 27.7 G/DL (ref 31.5–36.5)
MCV RBC AUTO: 72 FL (ref 78–100)
MONOCYTES # BLD AUTO: 0.6 10E3/UL (ref 0–1.3)
MONOCYTES NFR BLD AUTO: 7 %
NEUTROPHILS # BLD AUTO: 7.2 10E3/UL (ref 1.6–8.3)
NEUTROPHILS NFR BLD AUTO: 85 %
NRBC # BLD AUTO: 0 10E3/UL
NRBC BLD AUTO-RTO: 0 /100
P AXIS - MUSE: NORMAL DEGREES
P AXIS - MUSE: NORMAL DEGREES
PLATELET # BLD AUTO: 438 10E3/UL (ref 150–450)
POTASSIUM BLD-SCNC: 3.8 MMOL/L (ref 3.5–5)
PR INTERVAL - MUSE: NORMAL MS
PR INTERVAL - MUSE: NORMAL MS
PROCALCITONIN SERPL-MCNC: 0.06 NG/ML (ref 0–0.49)
PROT SERPL-MCNC: 6.8 G/DL (ref 6–8)
QRS DURATION - MUSE: 104 MS
QRS DURATION - MUSE: 90 MS
QT - MUSE: 278 MS
QT - MUSE: 330 MS
QTC - MUSE: 457 MS
QTC - MUSE: 505 MS
R AXIS - MUSE: -60 DEGREES
R AXIS - MUSE: -74 DEGREES
RBC # BLD AUTO: 3.95 10E6/UL (ref 3.8–5.2)
RSV RNA SPEC NAA+PROBE: NEGATIVE
SARS-COV-2 RNA RESP QL NAA+PROBE: NEGATIVE
SODIUM SERPL-SCNC: 140 MMOL/L (ref 136–145)
SYSTOLIC BLOOD PRESSURE - MUSE: 123 MMHG
SYSTOLIC BLOOD PRESSURE - MUSE: NORMAL MMHG
T AXIS - MUSE: 138 DEGREES
T AXIS - MUSE: 141 DEGREES
TROPONIN I SERPL-MCNC: 0.07 NG/ML (ref 0–0.29)
TROPONIN I SERPL-MCNC: 0.08 NG/ML (ref 0–0.29)
TSH SERPL DL<=0.005 MIU/L-ACNC: 1.17 UIU/ML (ref 0.3–5)
VENTRICULAR RATE- MUSE: 141 BPM
VENTRICULAR RATE- MUSE: 163 BPM
WBC # BLD AUTO: 8.5 10E3/UL (ref 4–11)

## 2023-05-24 PROCEDURE — 71046 X-RAY EXAM CHEST 2 VIEWS: CPT

## 2023-05-24 PROCEDURE — 258N000003 HC RX IP 258 OP 636: Performed by: EMERGENCY MEDICINE

## 2023-05-24 PROCEDURE — 83735 ASSAY OF MAGNESIUM: CPT | Performed by: EMERGENCY MEDICINE

## 2023-05-24 PROCEDURE — 210N000002 HC R&B HEART CARE

## 2023-05-24 PROCEDURE — 84145 PROCALCITONIN (PCT): CPT | Performed by: EMERGENCY MEDICINE

## 2023-05-24 PROCEDURE — 82248 BILIRUBIN DIRECT: CPT | Performed by: EMERGENCY MEDICINE

## 2023-05-24 PROCEDURE — 85018 HEMOGLOBIN: CPT | Performed by: STUDENT IN AN ORGANIZED HEALTH CARE EDUCATION/TRAINING PROGRAM

## 2023-05-24 PROCEDURE — 84484 ASSAY OF TROPONIN QUANT: CPT | Performed by: EMERGENCY MEDICINE

## 2023-05-24 PROCEDURE — 96375 TX/PRO/DX INJ NEW DRUG ADDON: CPT

## 2023-05-24 PROCEDURE — 250N000012 HC RX MED GY IP 250 OP 636 PS 637: Performed by: STUDENT IN AN ORGANIZED HEALTH CARE EDUCATION/TRAINING PROGRAM

## 2023-05-24 PROCEDURE — 96360 HYDRATION IV INFUSION INIT: CPT

## 2023-05-24 PROCEDURE — 94640 AIRWAY INHALATION TREATMENT: CPT

## 2023-05-24 PROCEDURE — C9803 HOPD COVID-19 SPEC COLLECT: HCPCS

## 2023-05-24 PROCEDURE — 250N000013 HC RX MED GY IP 250 OP 250 PS 637: Performed by: STUDENT IN AN ORGANIZED HEALTH CARE EDUCATION/TRAINING PROGRAM

## 2023-05-24 PROCEDURE — 250N000011 HC RX IP 250 OP 636: Performed by: INTERNAL MEDICINE

## 2023-05-24 PROCEDURE — 250N000011 HC RX IP 250 OP 636: Performed by: EMERGENCY MEDICINE

## 2023-05-24 PROCEDURE — 80053 COMPREHEN METABOLIC PANEL: CPT | Performed by: EMERGENCY MEDICINE

## 2023-05-24 PROCEDURE — 250N000009 HC RX 250: Performed by: EMERGENCY MEDICINE

## 2023-05-24 PROCEDURE — 94640 AIRWAY INHALATION TREATMENT: CPT | Mod: 76

## 2023-05-24 PROCEDURE — 96365 THER/PROPH/DIAG IV INF INIT: CPT

## 2023-05-24 PROCEDURE — 93005 ELECTROCARDIOGRAM TRACING: CPT | Performed by: EMERGENCY MEDICINE

## 2023-05-24 PROCEDURE — 96361 HYDRATE IV INFUSION ADD-ON: CPT

## 2023-05-24 PROCEDURE — 36415 COLL VENOUS BLD VENIPUNCTURE: CPT | Performed by: EMERGENCY MEDICINE

## 2023-05-24 PROCEDURE — 83880 ASSAY OF NATRIURETIC PEPTIDE: CPT | Performed by: EMERGENCY MEDICINE

## 2023-05-24 PROCEDURE — 85025 COMPLETE CBC W/AUTO DIFF WBC: CPT | Performed by: EMERGENCY MEDICINE

## 2023-05-24 PROCEDURE — 82962 GLUCOSE BLOOD TEST: CPT

## 2023-05-24 PROCEDURE — 96366 THER/PROPH/DIAG IV INF ADDON: CPT

## 2023-05-24 PROCEDURE — 83690 ASSAY OF LIPASE: CPT | Performed by: EMERGENCY MEDICINE

## 2023-05-24 PROCEDURE — 83605 ASSAY OF LACTIC ACID: CPT | Performed by: EMERGENCY MEDICINE

## 2023-05-24 PROCEDURE — 99285 EMERGENCY DEPT VISIT HI MDM: CPT | Mod: 25

## 2023-05-24 PROCEDURE — 96376 TX/PRO/DX INJ SAME DRUG ADON: CPT

## 2023-05-24 PROCEDURE — 87637 SARSCOV2&INF A&B&RSV AMP PRB: CPT | Performed by: EMERGENCY MEDICINE

## 2023-05-24 PROCEDURE — 99223 1ST HOSP IP/OBS HIGH 75: CPT | Performed by: STUDENT IN AN ORGANIZED HEALTH CARE EDUCATION/TRAINING PROGRAM

## 2023-05-24 PROCEDURE — 84443 ASSAY THYROID STIM HORMONE: CPT | Performed by: EMERGENCY MEDICINE

## 2023-05-24 PROCEDURE — 93970 EXTREMITY STUDY: CPT

## 2023-05-24 PROCEDURE — 96368 THER/DIAG CONCURRENT INF: CPT

## 2023-05-24 PROCEDURE — 36415 COLL VENOUS BLD VENIPUNCTURE: CPT | Performed by: STUDENT IN AN ORGANIZED HEALTH CARE EDUCATION/TRAINING PROGRAM

## 2023-05-24 PROCEDURE — 999N000157 HC STATISTIC RCP TIME EA 10 MIN

## 2023-05-24 RX ORDER — DILTIAZEM HYDROCHLORIDE 5 MG/ML
5 INJECTION INTRAVENOUS ONCE
Status: COMPLETED | OUTPATIENT
Start: 2023-05-24 | End: 2023-05-24

## 2023-05-24 RX ORDER — PANTOPRAZOLE SODIUM 40 MG/1
40 TABLET, DELAYED RELEASE ORAL DAILY
Status: DISCONTINUED | OUTPATIENT
Start: 2023-05-25 | End: 2023-06-05 | Stop reason: HOSPADM

## 2023-05-24 RX ORDER — GABAPENTIN 600 MG/1
600 TABLET ORAL 3 TIMES DAILY
Status: DISCONTINUED | OUTPATIENT
Start: 2023-05-24 | End: 2023-06-05 | Stop reason: HOSPADM

## 2023-05-24 RX ORDER — IPRATROPIUM BROMIDE AND ALBUTEROL SULFATE 2.5; .5 MG/3ML; MG/3ML
3 SOLUTION RESPIRATORY (INHALATION) ONCE
Status: COMPLETED | OUTPATIENT
Start: 2023-05-24 | End: 2023-05-24

## 2023-05-24 RX ORDER — DILTIAZEM HCL/D5W 125 MG/125
5-15 PLASTIC BAG, INJECTION (ML) INTRAVENOUS CONTINUOUS
Status: DISCONTINUED | OUTPATIENT
Start: 2023-05-24 | End: 2023-05-28

## 2023-05-24 RX ORDER — PROCHLORPERAZINE 25 MG
12.5 SUPPOSITORY, RECTAL RECTAL EVERY 12 HOURS PRN
Status: DISCONTINUED | OUTPATIENT
Start: 2023-05-24 | End: 2023-06-05 | Stop reason: HOSPADM

## 2023-05-24 RX ORDER — ONDANSETRON 2 MG/ML
4 INJECTION INTRAMUSCULAR; INTRAVENOUS EVERY 6 HOURS PRN
Status: DISCONTINUED | OUTPATIENT
Start: 2023-05-24 | End: 2023-06-05 | Stop reason: HOSPADM

## 2023-05-24 RX ORDER — DILTIAZEM HYDROCHLORIDE 5 MG/ML
15 INJECTION INTRAVENOUS ONCE
Status: COMPLETED | OUTPATIENT
Start: 2023-05-24 | End: 2023-05-24

## 2023-05-24 RX ORDER — METHYLPREDNISOLONE SODIUM SUCCINATE 125 MG/2ML
125 INJECTION, POWDER, LYOPHILIZED, FOR SOLUTION INTRAMUSCULAR; INTRAVENOUS ONCE
Status: COMPLETED | OUTPATIENT
Start: 2023-05-24 | End: 2023-05-24

## 2023-05-24 RX ORDER — ATORVASTATIN CALCIUM 10 MG/1
20 TABLET, FILM COATED ORAL AT BEDTIME
Status: DISCONTINUED | OUTPATIENT
Start: 2023-05-24 | End: 2023-06-05 | Stop reason: HOSPADM

## 2023-05-24 RX ORDER — SUCRALFATE 1 G/1
1 TABLET ORAL
Status: DISCONTINUED | OUTPATIENT
Start: 2023-05-24 | End: 2023-06-05 | Stop reason: HOSPADM

## 2023-05-24 RX ORDER — PROCHLORPERAZINE MALEATE 5 MG
5 TABLET ORAL EVERY 6 HOURS PRN
Status: DISCONTINUED | OUTPATIENT
Start: 2023-05-24 | End: 2023-06-05 | Stop reason: HOSPADM

## 2023-05-24 RX ORDER — FUROSEMIDE 40 MG
40 TABLET ORAL
Status: DISCONTINUED | OUTPATIENT
Start: 2023-05-24 | End: 2023-06-04

## 2023-05-24 RX ORDER — DEXTROSE MONOHYDRATE 25 G/50ML
25-50 INJECTION, SOLUTION INTRAVENOUS
Status: DISCONTINUED | OUTPATIENT
Start: 2023-05-24 | End: 2023-06-05 | Stop reason: HOSPADM

## 2023-05-24 RX ORDER — FLUTICASONE FUROATE AND VILANTEROL 200; 25 UG/1; UG/1
1 POWDER RESPIRATORY (INHALATION) DAILY
Status: DISCONTINUED | OUTPATIENT
Start: 2023-05-25 | End: 2023-06-05 | Stop reason: HOSPADM

## 2023-05-24 RX ORDER — DILTIAZEM HYDROCHLORIDE 5 MG/ML
10 INJECTION INTRAVENOUS ONCE
Status: COMPLETED | OUTPATIENT
Start: 2023-05-24 | End: 2023-05-24

## 2023-05-24 RX ORDER — ALBUTEROL SULFATE 90 UG/1
2 AEROSOL, METERED RESPIRATORY (INHALATION) EVERY 4 HOURS PRN
Status: DISCONTINUED | OUTPATIENT
Start: 2023-05-24 | End: 2023-06-05 | Stop reason: HOSPADM

## 2023-05-24 RX ORDER — MAGNESIUM OXIDE 400 MG/1
400 TABLET ORAL DAILY
Status: DISCONTINUED | OUTPATIENT
Start: 2023-05-25 | End: 2023-06-05 | Stop reason: HOSPADM

## 2023-05-24 RX ORDER — PREDNISONE 20 MG/1
20 TABLET ORAL DAILY
Status: DISCONTINUED | OUTPATIENT
Start: 2023-05-24 | End: 2023-05-25

## 2023-05-24 RX ORDER — ONDANSETRON 4 MG/1
4 TABLET, ORALLY DISINTEGRATING ORAL EVERY 6 HOURS PRN
Status: DISCONTINUED | OUTPATIENT
Start: 2023-05-24 | End: 2023-06-05 | Stop reason: HOSPADM

## 2023-05-24 RX ORDER — DIGOXIN 0.25 MG/ML
250 INJECTION INTRAMUSCULAR; INTRAVENOUS ONCE
Status: COMPLETED | OUTPATIENT
Start: 2023-05-24 | End: 2023-05-24

## 2023-05-24 RX ORDER — DILTIAZEM HYDROCHLORIDE 5 MG/ML
5 INJECTION INTRAVENOUS ONCE
Status: DISCONTINUED | OUTPATIENT
Start: 2023-05-24 | End: 2023-05-24

## 2023-05-24 RX ORDER — MECLIZINE HCL 12.5 MG 12.5 MG/1
12.5 TABLET ORAL 3 TIMES DAILY PRN
Status: DISCONTINUED | OUTPATIENT
Start: 2023-05-24 | End: 2023-06-05 | Stop reason: HOSPADM

## 2023-05-24 RX ORDER — POLYETHYLENE GLYCOL 3350 17 G
2 POWDER IN PACKET (EA) ORAL
Status: DISCONTINUED | OUTPATIENT
Start: 2023-05-24 | End: 2023-06-05 | Stop reason: HOSPADM

## 2023-05-24 RX ORDER — NICOTINE POLACRILEX 4 MG
15-30 LOZENGE BUCCAL
Status: DISCONTINUED | OUTPATIENT
Start: 2023-05-24 | End: 2023-06-05 | Stop reason: HOSPADM

## 2023-05-24 RX ORDER — IPRATROPIUM BROMIDE AND ALBUTEROL SULFATE 2.5; .5 MG/3ML; MG/3ML
3 SOLUTION RESPIRATORY (INHALATION) EVERY 4 HOURS PRN
Status: DISCONTINUED | OUTPATIENT
Start: 2023-05-24 | End: 2023-06-05 | Stop reason: HOSPADM

## 2023-05-24 RX ORDER — OXYCODONE HYDROCHLORIDE 5 MG/1
5-10 TABLET ORAL EVERY 6 HOURS PRN
Status: DISCONTINUED | OUTPATIENT
Start: 2023-05-24 | End: 2023-06-05 | Stop reason: HOSPADM

## 2023-05-24 RX ORDER — DOXYCYCLINE 100 MG/1
100 CAPSULE ORAL 2 TIMES DAILY
Status: COMPLETED | OUTPATIENT
Start: 2023-05-24 | End: 2023-05-27

## 2023-05-24 RX ORDER — MAGNESIUM SULFATE HEPTAHYDRATE 40 MG/ML
2 INJECTION, SOLUTION INTRAVENOUS ONCE
Status: COMPLETED | OUTPATIENT
Start: 2023-05-24 | End: 2023-05-24

## 2023-05-24 RX ORDER — LIDOCAINE 40 MG/G
CREAM TOPICAL
Status: DISCONTINUED | OUTPATIENT
Start: 2023-05-24 | End: 2023-06-05 | Stop reason: HOSPADM

## 2023-05-24 RX ORDER — DIGOXIN 0.25 MG/ML
250 INJECTION INTRAMUSCULAR; INTRAVENOUS EVERY 8 HOURS
Status: COMPLETED | OUTPATIENT
Start: 2023-05-25 | End: 2023-05-25

## 2023-05-24 RX ADMIN — METHYLPREDNISOLONE SODIUM SUCCINATE 125 MG: 125 INJECTION, POWDER, FOR SOLUTION INTRAMUSCULAR; INTRAVENOUS at 10:27

## 2023-05-24 RX ADMIN — MAGNESIUM SULFATE HEPTAHYDRATE 2 G: 40 INJECTION, SOLUTION INTRAVENOUS at 11:43

## 2023-05-24 RX ADMIN — DOXYCYCLINE 100 MG: 100 CAPSULE ORAL at 16:08

## 2023-05-24 RX ADMIN — IPRATROPIUM BROMIDE AND ALBUTEROL SULFATE 3 ML: .5; 3 SOLUTION RESPIRATORY (INHALATION) at 15:52

## 2023-05-24 RX ADMIN — ATORVASTATIN CALCIUM 20 MG: 10 TABLET, FILM COATED ORAL at 20:53

## 2023-05-24 RX ADMIN — SUCRALFATE 1 G: 1 TABLET ORAL at 16:09

## 2023-05-24 RX ADMIN — APIXABAN 5 MG: 5 TABLET, FILM COATED ORAL at 20:53

## 2023-05-24 RX ADMIN — DIGOXIN 250 MCG: 0.25 INJECTION INTRAMUSCULAR; INTRAVENOUS at 23:52

## 2023-05-24 RX ADMIN — DILTIAZEM HYDROCHLORIDE 10 MG: 5 INJECTION, SOLUTION INTRAVENOUS at 11:33

## 2023-05-24 RX ADMIN — GABAPENTIN 600 MG: 600 TABLET, FILM COATED ORAL at 20:54

## 2023-05-24 RX ADMIN — DIGOXIN 250 MCG: 0.25 INJECTION INTRAMUSCULAR; INTRAVENOUS at 16:17

## 2023-05-24 RX ADMIN — Medication 5 MG/HR: at 13:14

## 2023-05-24 RX ADMIN — Medication 15 MG/HR: at 21:26

## 2023-05-24 RX ADMIN — SODIUM CHLORIDE 500 ML: 9 INJECTION, SOLUTION INTRAVENOUS at 10:23

## 2023-05-24 RX ADMIN — PREDNISONE 20 MG: 20 TABLET ORAL at 16:08

## 2023-05-24 RX ADMIN — FUROSEMIDE 40 MG: 40 TABLET ORAL at 16:08

## 2023-05-24 RX ADMIN — DILTIAZEM HYDROCHLORIDE 5 MG: 5 INJECTION, SOLUTION INTRAVENOUS at 10:33

## 2023-05-24 RX ADMIN — IPRATROPIUM BROMIDE AND ALBUTEROL SULFATE 3 ML: .5; 3 SOLUTION RESPIRATORY (INHALATION) at 10:08

## 2023-05-24 RX ADMIN — DILTIAZEM HYDROCHLORIDE 15 MG: 5 INJECTION, SOLUTION INTRAVENOUS at 12:17

## 2023-05-24 RX ADMIN — INSULIN ASPART 1 UNITS: 100 INJECTION, SOLUTION INTRAVENOUS; SUBCUTANEOUS at 18:36

## 2023-05-24 RX ADMIN — SUCRALFATE 1 G: 1 TABLET ORAL at 20:54

## 2023-05-24 ASSESSMENT — ACTIVITIES OF DAILY LIVING (ADL)
ADLS_ACUITY_SCORE: 30
ADLS_ACUITY_SCORE: 30
ADLS_ACUITY_SCORE: 35
ADLS_ACUITY_SCORE: 36
DEPENDENT_IADLS:: CLEANING;COOKING;LAUNDRY;SHOPPING;MEAL PREPARATION;MEDICATION MANAGEMENT;TRANSPORTATION
ADLS_ACUITY_SCORE: 35

## 2023-05-24 ASSESSMENT — ENCOUNTER SYMPTOMS
DIARRHEA: 0
COUGH: 1
FEVER: 0
NAUSEA: 0
ABDOMINAL PAIN: 1
VOMITING: 0
SHORTNESS OF BREATH: 1
DYSURIA: 0

## 2023-05-24 NOTE — PHARMACY-ADMISSION MEDICATION HISTORY
Pharmacist Admission Medication History    Admission medication history is complete. The information provided in this note is only as accurate as the sources available at the time of the update.    Medication reconciliation/reorder completed by provider prior to medication history? No    Information Source(s): Patient and CareEverywhere/SureScripts via in-person    Pertinent Information:     Changes made to PTA medication list:    Added: None    Deleted: cefdinir, on Breo (delete Symbicort and Dulera)    Changed: None    Medication Affordability:       Allergies reviewed with patient and updates made in EHR: yes    Medication History Completed By: Med Odell RPH 5/24/2023 1:43 PM    Prior to Admission medications    Medication Sig Last Dose Taking? Auth Provider Long Term End Date   albuterol (PROAIR HFA/PROVENTIL HFA/VENTOLIN HFA) 108 (90 Base) MCG/ACT inhaler INHALE 2 PUFFS BY MOUTH EVERY 4 HOURS AS NEEDED FOR WHEEZING Past Week Yes Cammy Bui MD Yes    apixaban ANTICOAGULANT (ELIQUIS) 5 MG tablet Take 1 tablet (5 mg) by mouth 2 times daily 5/24/2023 Yes Cammy Bui MD     atorvastatin (LIPITOR) 20 MG tablet TAKE 1 TABLET(20 MG) BY MOUTH AT BEDTIME 5/23/2023 Yes Cammy Bui MD Yes    diltiazem ER COATED BEADS (CARDIZEM CD/CARTIA XT) 180 MG 24 hr capsule TAKE 1 CAPSULE(180 MG) BY MOUTH DAILY 5/24/2023 Yes Cammy Bui MD Yes    fluticasone-vilanterol (BREO ELLIPTA) 200-25 MCG/ACT inhaler Inhale 1 puff into the lungs daily 5/24/2023 Yes Cammy Bui MD     furosemide (LASIX) 40 MG tablet Take 1 tablet (40 mg) by mouth 2 times daily 5/24/2023 at am Yes Cammy Bui MD Yes    gabapentin (NEURONTIN) 600 MG tablet Take 1 tablet (600 mg) by mouth 3 times daily 5/24/2023 Yes Cammy Bui MD Yes    ipratropium - albuterol 0.5 mg/2.5 mg/3 mL (DUONEB) 0.5-2.5 (3) MG/3ML neb solution Take  "1 vial (3 mLs) by nebulization every 4 hours as needed for shortness of breath / dyspnea or wheezing Past Week Yes Cammy Bui MD Yes    magnesium oxide (MAG-OX) 400 MG tablet Take 1 tablet (400 mg) by mouth daily 5/24/2023 Yes Angela Kaiser MD     meclizine (ANTIVERT) 25 MG tablet TAKE 1 TABLET(25 MG) BY MOUTH THREE TIMES DAILY AS NEEDED FOR DIZZINESS OR NAUSEA Unknown Yes Cammy Bui MD     metFORMIN (GLUCOPHAGE) 500 MG tablet TAKE 1 TABLET(500 MG) BY MOUTH TWICE DAILY WITH MEALS 5/24/2023 Yes Cammy Bui MD Yes    nicotine (NICOTROL) 10 MG inhaler Use 1 cartridge as needed for urge to smoke by puffing over course of 20min.  Use 6-16 cart/day; reduce number of cart/day over 6-12 weeks.  at has not started yet Yes Cammy Bui MD     nystatin (NYSTOP) powder [NYSTATIN (NYSTOP) POWDER] Apply 1 application topically 2 (two) times a day as needed. 2-3 times to affected area(s). Unknown Yes Cammy Bui MD     omeprazole (PRILOSEC) 20 MG DR capsule Take 20 mg by mouth daily 5/24/2023 Yes Unknown, Entered By History     oxyCODONE IR (ROXICODONE) 10 MG tablet Take 1 tablet (10 mg) by mouth every 6 hours as needed for moderate to severe pain 5/23/2023 Yes Cammy Bui MD     potassium chloride ER (KLOR-CON M) 20 MEQ CR tablet Take 2 tablets (40 mEq) by mouth daily 5/24/2023 Yes Louis Dutta MD     sucralfate (CARAFATE) 1 GM tablet TAKE 1 TABLET BY MOUTH FOUR TIMES DAILY. CRUSH TABLET AND MIX IT WITH A LITTLE WATER THEN SWALLOW 5/24/2023 Yes Cammy Bui MD     ACCU-CHEK SOFTCLIX LANCETS lancets [ACCU-CHEK SOFTCLIX LANCETS LANCETS] TEST THREE TIMES DAILY   Cammy Bui MD     BD ULTRA-FINE SHORT PEN NEEDLE 31 gauge x 5/16\" Ndle [BD ULTRA-FINE SHORT PEN NEEDLE 31 GAUGE X 5/16\" NDLE] TEST FOUR TIMES DAILY WITH MEALS AND AT BEDTIME   Cammy Bui MD   "   blood-glucose meter (ONETOUCH VERIO IQ METER) Misc [BLOOD-GLUCOSE METER (ONETOUCH VERIO IQ METER) MISC] Check blood sugar three times a day.   Cammy Bui MD     diaper,brief,adult,disposable (ADULT BRIEFS - LARGE) Misc [DIAPER,BRIEF,ADULT,DISPOSABLE (ADULT BRIEFS - LARGE) MISC] Use 3-4 daily as needed for incontinence   Cammy Bui MD     generic lancets (FINGERSTIX LANCETS) [GENERIC LANCETS (FINGERSTIX LANCETS)] Dispense brand per patient's insurance at pharmacy discretion.   Cammy Bui MD     guaiFENesin-codeine (ROBITUSSIN AC) 100-10 MG/5ML solution Take 5-10 mLs by mouth every 4 hours as needed for cough   Cammy Bui MD     naloxone (NARCAN) 4 MG/0.1ML nasal spray Spray 1 spray (4 mg) into one nostril alternating nostrils once as needed for opioid reversal every 2-3 minutes until assistance arrives   Cammy Bui MD Yes    ONETOUCH VERIO IQ test strip USE TO TEST THREE TIMES DAILY   Cammy Bui MD

## 2023-05-24 NOTE — PROGRESS NOTES
73-year-old white female to the emergency department with increased shortness of breath and hypoxia.  Noted be in atrial fibrillation with a somewhat difficult to control heart rate.  Currently on a Cardizem drip with digoxin IV.  Agree, does have a history of atypical atrial flutter and status post PVI ablation in 2012.  Formal consult to follow tomorrow.

## 2023-05-24 NOTE — H&P
[facility]    History and Physical - Hospitalist Service       Date of Admission:  5/24/2023    Assessment & Plan      Mary Kay Tejada is a 73 year old female admitted on 5/24/2023. She has a past medical history of paroxysmal atrial flutter on apixaban, COPD, hyperlipidemia, diabetes mellitus type 2 with neuropathy, reflux, dizziness, tobacco use, fibromyalgia and chronic pain syndrome, who presents with shortness of breath and elevated heart rate in the 170s range.    On admission is tachycardic 170, otherwise stable vitals.  BMP nonremarkable, magnesium low 1.5, , CXR shows small to moderate right pleural effusion. Adjacent opacity in the basal right chest consistent with multisegment lower/middle lobe atelectasis, similar to February 2023. There is minimal pleural fluid in the left posterior costophrenic sulcus; lactate 1.3, procalcitonin negative, negative troponin, negative for DVT on US; EKG shows atrial flutter. Patient was given extra diltiazem; admitted and cardiology consulted. Given steroid burst (only 3 days initially) and doxycycline for anti-inflammatory (noted long QTc so avoiding azithromycin). Also hgb noted at 7.9 but no symptoms though prior in February was 9.2. On telemetry.  Following the hemoglobin for anemia serially. Admission diagnoses: atrial flutter, copd exacerbation, and anemia.    - - - - -   Atrial flutter   Shortness of breath  Long QTc  Assessment: discussed w/ ER, consideration of diltiazem drip or further escalation such as digoxin but would defer to cardiology, who is consulted. Additionally differential also cognizant of her anemia and noted hx of reflux and on carafate and ppi.   Plan:   - admit to inpatient  - consult to cardiology  - continue diltiazem drip until further recommendations; suspect possibly digoxin load   - noted that this afternoon, that Dr. Schwarz discussed w/ ER team and iv digoxin ordered; appreciate  - telemetry  - monitor  clinically  - continue apixaban   - avoid QTc-prolonging medications (ie ordering doxycycline instead of azithromycin)     COPD exacerbation  Acute respiratory failure with hypoxia  Assessment: requiring 5L, her baseline is 2.5 L PTA.  Suspect the exacerbation could be a trigger exacerbating the above primary issue, aflutter.  Discussed with the patient and her son about steroids and anti-inflammatories.  Given the long QTc we will start with doxycycline for anti-inflammatory antibiotic. Home regimen of ProAir and DuoNeb and Breo. Does not appears overtly volume up and CXR effusions are stable.   Plan:   - starting steroids: low dose 20mg daily for x3 days initially  - start doxycycline 100mg po bid, x3 days initially  - continue home medications  - duonebs, adjust as per RT  - 02 and wean as able to baseline of 2.5L NC  - continue PTA lasix    Anemia, microcytic   Hx of gastric ulcer  Assessment: The delta is concerning with hemoglobin at 7.9 though earlier was 9.2 in February.  However, no signs or symptoms concerning for bleed, no melena or hematochezia or hematemesis.  She does have a prior history of prior ulcerations though, so need to carefully monitor  Plan:  -Serially follow hemoglobin, ordered for every 8 hours  - see below regarding continuing reflux meds  - transfuse if hgb < 7     Reflux  A/P-see issue above as well, continue PPI Carafate     Hyperlipidemia  A/P-continue statin    Diabetes mellitus type 2 with neuropathy  Assessment: On metformin, has neuropathy  Plan:  -Hypoglycemia protocol  -Hold metformin while admitted to the hospital  -Sliding scale insulin, start with low-dose    -Continue gabapentin    Hypomagnesemia  A/p- replacement protocol; continue home magnesium tomorrow    Hypertension  A- pressures are soft. On diltizem for both htn and aflutter. On diuretic, given hypoxia, will continue for now.   P:  - on diltizaem drip in E  - continue PTA lasix for now     Dizziness  A/p- stable but  "can continue PRN meclizine     Tobacco use  A/p- encouraged decreasing and eventual cessation; continue nicotine inhaler     fibromyalgia   chronic pain syndrome  Assessment: Currently pain stable on admission.  She is on a regimen of oxycodone.  Plan:  -Continue for now but modified with a lower range available: 5-10mg q6 hrs prn (instead of scheduled 10)        Diet: Combination Diet Low Saturated Fat Na <2400mg Diet, No Caffeine Diet    DVT Prophylaxis: DOAC  Chairez Catheter: Not present  Lines: None     Cardiac Monitoring: ACTIVE order. Indication: Tachyarrhythmias, acute (48 hours)  Code Status: Full Code      Clinically Significant Risk Factors Present on Admission             # Hypomagnesemia: Lowest Mg = 1.5 mg/dL in last 2 days, will replace as needed   # Hypoalbuminemia: Lowest albumin = 3.1 g/dL at 5/24/2023 10:15 AM, will monitor as appropriate  # Drug Induced Coagulation Defect: home medication list includes an anticoagulant medication    # Hypertension: Noted on problem list   # Acute Respiratory Failure: Documented O2 saturation < 91%.  Continue supplemental oxygen as needed              Disposition Plan      Expected Discharge Date: 05/26/2023                  Rayo Mendoza MD  Hospitalist Service  [facility]  Securely message with [employer] (more info)  Text page via [employer] Paging/Directory     ______________________________________________________________________    Chief Complaint   Per patient: Shortness of breath; per son: her 'heart rate has been really high for probably two weeks!\"    History is obtained from the patient and her son Heriberto at bedside.     History of Present Illness   Mary Kay Tejada is a 73 year old female who has a past medical history of paroxysmal atrial flutter on apixaban, COPD, hyperlipidemia, diabetes mellitus type 2 with neuropathy, reflux, dizziness, tobacco use,  fibromyalgia and chronic pain syndrome, who presents with shortness of breath " "and elevated heart rate in the 170s range.    The patient presents with several days of shortness of breath, accompanied by dyspnea, but no productive cough.  She is chronically on 2.5 L nasal cannula and is currently at 5 L NC.  Additionally, her son at bedside Heriberto notes that she has had elevated heart rate greater than 100, up to the 170s for a reported \" 2 weeks.\"  He has been trying to get her to go see a provider or seek further evaluation but the shortness of breath started day prior to arrival is what prompted her to present.      She denies having any fevers or chills, no chest pain, no abdominal pain, no hematochezia or melena, no nausea or vomiting, no hematemesis.  She does note hx of chronic pain and history of fibromyalgia and gastric ulcer.  No neurologic sensory changes or weakness. She is full code.  Discussed care plans with the patient and her son.       Past Medical History    Past Medical History:   Diagnosis Date     Anemia      Aortic stenosis      Atrial fibrillation (H)      Atrial flutter (H)      Benign neoplasm of adenomatous polyp     large intestine      Chronic constipation      Chronic pain syndrome      COPD (chronic obstructive pulmonary disease) (H)     Oxygen at night      Dependence on supplemental oxygen     Oxygen at noc, during the day as needed     Depression      Diabetes mellitus (H)      Dry eye syndrome      Fibromyalgia      Ganglion     left wrist     GERD (gastroesophageal reflux disease)      Hyperlipidemia      Hypertension      Hypokalemia      Infective otitis externa, unspecified     Created by Conversion      Larynx edema      Lung disease      Malignant neoplasm of vulva (H)     Created by Conversion [facility] Annotation: Apr 17 2007  8:24AM - Cammy Bui:  resection per Dr. Alfonso Mane 9/06;  Replacement Utility updated for latest IMO load     Medial epicondylitis      Onychomycosis      Osteoarthritis      Peptic ulcer      Polyneuropathy  " "    Vulvar malignant neoplasm (H)        Past Surgical History   Past Surgical History:   Procedure Laterality Date     BIOPSY BREAST Right      BIOPSY BREAST Right 01/28/2015     BIOPSY BREAST Right 1/28/2015    Procedure: RIGHT BREAST BIOPSY AFTER WIRE LOCALIZATION AT 0940;  Surgeon: Renée Soriano MD;  Location: [facility] [place];  Service:      BIOPSY OF BREAST, INCISIONAL      Description: Incisional Breast Biopsy;  Recorded: 11/13/2007;  Comments: benign     COLONOSCOPY N/A 6/14/2019    Procedure: COLONOSCOPY;  Surgeon: Eduardo Mora MD;  Location: [facility] [place];  Service: Gastroenterology     ESOPHAGOSCOPY, GASTROSCOPY, DUODENOSCOPY (EGD), COMBINED N/A 11/6/2018    Procedure: ESOPHAGOGASTRODUODENOSCOPY;  Surgeon: Lit Fernando MD;  Location: [facility] [place];  Service:      HYSTERECTOMY       JOINT REPLACEMENT Left     TKA     PICC TRIPLE LUMEN PLACEMENT  1/12/2023          [place] ABLATE HEART DYSRHYTHM FOCUS      Description: Catheter Ablation Atrial Fibrillation;  Recorded: 07/31/2012;  Comments: 7/24/12 PVI with Dr. Gardiner and nilay to all 5 pulm veins and CTI fl ablation line as well.     [facility] SUPRACERV ABD HYSTERECTOMY      Description: Supracervical Hysterectomy;  Proc Date: 01/01/1985;  Comments: some cervix left!; ovaries intact; done for bleeding       Prior to Admission Medications   Prior to Admission Medications   Prescriptions Last Dose Informant Patient Reported? Taking?   ACCU-CHEK SOFTCLIX LANCETS lancets   No No   Sig: [ACCU-CHEK SOFTCLIX LANCETS LANCETS] TEST THREE TIMES DAILY   BD ULTRA-FINE SHORT PEN NEEDLE 31 gauge x 5/16\" Ndle   No No   Sig: [BD ULTRA-FINE SHORT PEN NEEDLE 31 GAUGE X 5/16\" NDLE] TEST FOUR TIMES DAILY WITH MEALS AND AT BEDTIME   ONETOUCH VERIO IQ test strip   No No   Sig: USE TO TEST THREE TIMES DAILY   albuterol (PROAIR HFA/PROVENTIL HFA/VENTOLIN HFA) 108 (90 Base) MCG/ACT inhaler Past Week  No Yes   Sig: INHALE 2 PUFFS BY MOUTH EVERY 4 HOURS AS NEEDED FOR " WHEEZING   apixaban ANTICOAGULANT (ELIQUIS) 5 MG tablet 5/24/2023  No Yes   Sig: Take 1 tablet (5 mg) by mouth 2 times daily   atorvastatin (LIPITOR) 20 MG tablet 5/23/2023  No Yes   Sig: TAKE 1 TABLET(20 MG) BY MOUTH AT BEDTIME   blood-glucose meter (ONETOUCH VERIO IQ METER) Misc   No No   Sig: [BLOOD-GLUCOSE METER (ONETOUCH VERIO IQ METER) MISC] Check blood sugar three times a day.   diaper,brief,adult,disposable (ADULT BRIEFS - LARGE) Misc   No No   Sig: [DIAPER,BRIEF,ADULT,DISPOSABLE (ADULT BRIEFS - LARGE) MISC] Use 3-4 daily as needed for incontinence   diltiazem ER COATED BEADS (CARDIZEM CD/CARTIA XT) 180 MG 24 hr capsule 5/24/2023  No Yes   Sig: TAKE 1 CAPSULE(180 MG) BY MOUTH DAILY   fluticasone-vilanterol (BREO ELLIPTA) 200-25 MCG/ACT inhaler 5/24/2023  No Yes   Sig: Inhale 1 puff into the lungs daily   furosemide (LASIX) 40 MG tablet 5/24/2023 at am  No Yes   Sig: Take 1 tablet (40 mg) by mouth 2 times daily   gabapentin (NEURONTIN) 600 MG tablet 5/24/2023  No Yes   Sig: Take 1 tablet (600 mg) by mouth 3 times daily   generic lancets (FINGERSTIX LANCETS)   No No   Sig: [GENERIC LANCETS (FINGERSTIX LANCETS)] Dispense brand per patient's insurance at pharmacy discretion.   guaiFENesin-codeine (ROBITUSSIN AC) 100-10 MG/5ML solution   No No   Sig: Take 5-10 mLs by mouth every 4 hours as needed for cough   ipratropium - albuterol 0.5 mg/2.5 mg/3 mL (DUONEB) 0.5-2.5 (3) MG/3ML neb solution Past Week  No Yes   Sig: Take 1 vial (3 mLs) by nebulization every 4 hours as needed for shortness of breath / dyspnea or wheezing   magnesium oxide (MAG-OX) 400 MG tablet 5/24/2023  No Yes   Sig: Take 1 tablet (400 mg) by mouth daily   meclizine (ANTIVERT) 25 MG tablet Unknown  No Yes   Sig: TAKE 1 TABLET(25 MG) BY MOUTH THREE TIMES DAILY AS NEEDED FOR DIZZINESS OR NAUSEA   metFORMIN (GLUCOPHAGE) 500 MG tablet 5/24/2023  No Yes   Sig: TAKE 1 TABLET(500 MG) BY MOUTH TWICE DAILY WITH MEALS   naloxone (NARCAN) 4 MG/0.1ML nasal  spray   No No   Sig: Spray 1 spray (4 mg) into one nostril alternating nostrils once as needed for opioid reversal every 2-3 minutes until assistance arrives   nicotine (NICOTROL) 10 MG inhaler  at has not started yet  No Yes   Sig: Use 1 cartridge as needed for urge to smoke by puffing over course of 20min.  Use 6-16 cart/day; reduce number of cart/day over 6-12 weeks.   nystatin (NYSTOP) powder Unknown  No Yes   Sig: [NYSTATIN (NYSTOP) POWDER] Apply 1 application topically 2 (two) times a day as needed. 2-3 times to affected area(s).   omeprazole (PRILOSEC) 20 MG DR capsule 5/24/2023  Yes Yes   Sig: Take 20 mg by mouth daily   oxyCODONE IR (ROXICODONE) 10 MG tablet 5/23/2023  No Yes   Sig: Take 1 tablet (10 mg) by mouth every 6 hours as needed for moderate to severe pain   potassium chloride ER (KLOR-CON M) 20 MEQ CR tablet 5/24/2023  No Yes   Sig: Take 2 tablets (40 mEq) by mouth daily   sucralfate (CARAFATE) 1 GM tablet 5/24/2023  No Yes   Sig: TAKE 1 TABLET BY MOUTH FOUR TIMES DAILY. CRUSH TABLET AND MIX IT WITH A LITTLE WATER THEN SWALLOW      Facility-Administered Medications: None        Review of Systems    The 10 point Review of Systems is negative other than noted in the HPI or here.      Social History   I have reviewed this patient's social history and updated it with pertinent information if needed.  Social History     Tobacco Use     Smoking status: Every Day     Packs/day: 0.25     Types: Cigarettes     Smokeless tobacco: Never     Tobacco comments:     seen by TTS inpatient on 3/31/22   Substance Use Topics     Alcohol use: Yes     Comment: Alcoholic Drinks/day: very little     Drug use: No       Family History   I have reviewed this patient's family history and updated it with pertinent information if needed.  Family History   Problem Relation Age of Onset     Heart Failure Mother      Cancer Other         paternal HX-laryngeal      Alcoholism Sister      No Known Problems Daughter      No Known  "Problems Maternal Grandmother      No Known Problems Maternal Grandfather      No Known Problems Paternal Grandmother      No Known Problems Paternal Grandfather      No Known Problems Maternal Aunt      No Known Problems Paternal Aunt      Alcoholism Sister      Alcoholism Brother      Alcoholism Father      Cancer Paternal Uncle         Gastric-Alcohol     Cancer Paternal Uncle         gastric-Alcohol     Hereditary Breast and Ovarian Cancer Syndrome No family hx of      Breast Cancer No family hx of      Colon Cancer No family hx of      Endometrial Cancer No family hx of      Ovarian Cancer No family hx of        Allergies   Allergies   Allergen Reactions     Celebrex [Celecoxib] Rash     patient had butterfly rash - \"lupus-like\"       Latex Rash        Physical Exam   Vital Signs: Temp: 97.9  F (36.6  C) Temp src: Temporal BP: 121/63 Pulse: (!) 164   Resp: 28 SpO2: 91 % O2 Device: Nasal cannula Oxygen Delivery: 4 LPM  Weight: 150 lbs 0 oz      GENERAL:  Alert, appears comfortable, in no acute distress, appears stated age   HEAD:  Normocephalic, without obvious abnormality, atraumatic   EYES:  PERRL, conjunctiva/corneas clear, no scleral icterus, EOM's intact   NOSE: Nares normal, septum midline, mucosa normal, no drainage   THROAT: Lips, mucosa, and tongue normal; teeth and gums normal, mouth moist   NECK: Supple, symmetrical, trachea midline   BACK:   Symmetric, no curvature, ROM normal   LUNGS:    Coarse breath sounds bilaterally, no rales or rhonchi or conspicuous wheezes, increased respiratory effort with speaking; on 5 L nasal cannula.   CHEST WALL:  No tenderness or conspicuous deformity   HEART:   Tachycardic, no murmur, rub, or gallop, no conspicuous jugular venous distention noted, peripheral pulses intact    ABDOMEN:   Soft, non-tender, bowel sounds auscultated in all four quadrants, no masses, no organomegaly, no rebound or guarding   EXTREMITIES: Extremities normal, atraumatic, no cyanosis or edema  "   SKIN: Dry to touch, no exanthems in the visualized areas   NEURO: Alert, oriented x3, moves all four extremities of their own volition    PSYCH: Cooperative and behavior is appropriate       Medical Decision Making       82 MINUTES SPENT BY ME on the date of service doing chart review, history, exam, documentation & further activities per the note.      Data     I have personally reviewed the following data over the past 24 hrs:    8.5  \   7.9 (L)   / 438     140 97 (L) 24 /  117   3.8 28 0.77 \       ALT: 35 AST: 34 AP: 129 (H) TBILI: 1.3 (H)   ALB: 3.1 (L) TOT PROTEIN: 6.8 LIPASE: 11       Trop: N/A BNP: 176 (H)       TSH: 1.17 T4: N/A A1C: N/A       Procal: 0.06 CRP: N/A Lactic Acid: 1.3         Imaging results reviewed over the past 24 hrs:   Recent Results (from the past 24 hour(s))   US Lower Extremity Venous Duplex Bilateral    Narrative    EXAM: US LOWER EXTREMITY VENOUS DUPLEX BILATERAL  LOCATION: [facility]  DATE/TIME: 5/24/2023 12:10 PM CDT    INDICATION: Bilateral lower extremity pain, swelling and edema.  COMPARISON: None.  TECHNIQUE: Venous Duplex ultrasound of bilateral lower extremities with and without compression, augmentation and duplex. Color flow and spectral Doppler with waveform analysis performed.    FINDINGS: Exam includes the common femoral, femoral, popliteal veins as well as segmentally visualized deep calf veins and greater saphenous vein.     RIGHT: No deep vein thrombosis. No superficial thrombophlebitis. Mildly complex predominantly anechoic avascular cyst in the right popliteal fossa measuring approximately 6.2 x 4.3 x 1.9 cm.    LEFT: No deep vein thrombosis. No superficial thrombophlebitis. No popliteal cyst.      Impression    IMPRESSION:  1.  No deep venous thrombosis in the bilateral lower extremities.  2.  Mildly complex right popliteal fossa Baker's cyst.   Chest XR,  PA & LAT    Narrative    EXAM: XR CHEST 2 VIEWS  LOCATION: [facility]  [facility]  DATE/TIME: 5/24/2023 1:04 PM CDT    INDICATION: Shortness of breath  COMPARISON: Portable AP view of the chest 02/08/2023      Impression    IMPRESSION:     There is a small to moderate right pleural effusion. Adjacent opacity in the basal right chest consistent with multisegment lower/middle lobe atelectasis, similar to February 2023. There is minimal pleural fluid in the left posterior costophrenic sulcus.    No new, superimposed interstitial or alveolar lung opacities.    The lower right heart border remains silhouetted. No change in mildly enlarged cardiac silhouette. Vascular pedicle width is normal.    Degenerative osteophytes of the thoracic spine and right glenohumeral osteoarthrosis is again noted.

## 2023-05-24 NOTE — ED NOTES
Notified Dr. Sneed of Sats 76%, on 5L NC. BP stable, pt reports SOB after eating lunch. Pending cardiology consult. Neb ordered.

## 2023-05-24 NOTE — ED TRIAGE NOTES
Pt presents with shortness of breath x 2 days. History of COPD. Has home O2. Uusually on 1L. Arrived without O2. SpO2 89% on room air. History of A-Fib. Took Diltiazem this morning. Heart rate noted to be 175 via Pulse Ox. Denies chest pain     Triage Assessment     Row Name 05/24/23 0936       Triage Assessment (Adult)    Airway WDL WDL       Respiratory WDL    Respiratory WDL X;rhythm/pattern    Rhythm/Pattern, Respiratory shortness of breath       Skin Circulation/Temperature WDL    Skin Circulation/Temperature WDL WDL       Cardiac WDL    Cardiac WDL X;rhythm    Cardiac Rhythm ST       Peripheral/Neurovascular WDL    Peripheral Neurovascular WDL WDL       Cognitive/Neuro/Behavioral WDL    Cognitive/Neuro/Behavioral WDL WDL

## 2023-05-24 NOTE — ED PROVIDER NOTES
EMERGENCY DEPARTMENT ENCOUNTER      NAME: Mary Kay Tejada  AGE: 73 year old female  YOB: 1950  MRN: [id number]  EVALUATION DATE & TIME: 5/24/2023  9:40 AM    PCP: Cammy Bui    ED PROVIDER: Sanam Sneed M.D.        Chief Complaint   Patient presents with     Shortness of Breath         FINAL IMPRESSION:    1. Hypoxia    2. SOB (shortness of breath)    3. Atrial flutter, unspecified type (H)    4. Synovial cyst of knee, unspecified laterality            MEDICAL DECISION MAKING:    Mary Kay Tejada is a 73 year old female with history of COPD, peripheral edema, diabetes, hypertension, fibromyalgia who presents to the ER with complaints of shortness of breath.  Laboratories overall reassuring.  She is in rapid atrial flutter which has been difficult to control with diltiazem.  Talking with family states she has been is in this now for several weeks with heart rates of 180 at home.  I do believe that there is a component of COPD exacerbation here as well as she does seem to improve with her respiratory status and how she is feeling from a respiratory status with nebs.  She is on diltiazem infusion without control of her heart rate and therefore spoke with cardiology who recommends loading with digoxin and this has been ordered.  Hemoglobin has also dropped from 9.2 down to 7.9 in the past couple of months.  Though it does look like 8 seems to be more of her baseline in general.  She does not appear toxic.  Do not think this represents sepsis at this time.  Plan is admission to the hospitalist service for rate control and ongoing respiratory management. CXR imaging does not show any significant new obvious pneumonias and procalcitonin negative.  Troponin negative.  Think PE is less likely this patient as she is chronically anticoagulated and ultrasound negative for DVT.        ED COURSE:  9:47 AM  I met with the patient to gather history and perform my exam. ED course and  treatment discussed.    3:14 PM  Updated patient and family.  CT scan overall nonspecific though there is a new pleural effusion.  No obvious pneumonia.  Continues to be in rapid atrial flutter despite being on maximum dose IV diltiazem infusion.  Will discuss with cardiology about considering things like digoxin.  Patient has been accepted to the hospital by the hospitalist for ongoing management of shortness of breath and tachycardia.  I do suspect that some of this shortness of breath is due to COPD exacerbation though certainly tachycardia could be contributing.  No obvious signs for significant fluid overload.  Troponin unremarkable.  Speaking with family at the bedside they state that her heart rate has been going this fast now for several weeks and that she has just been reluctant to be seen.  Patient has been accepted to the hospital by the hospitalist will go to cardiac telemetry under inpatient status.    3:28 PM  No obvious sign for infection at this time.  Pro-Louie unremarkable.  Lactic acid normal.  No fevers.  Plan at this time is admission to the hospitalist for rate control.  I suspect that there is a degree of COPD exacerbation steroids have been initiated.  Spoke with Dr. Schwarz of cardiology who recommends loading with digoxin 0.25 mg IV and this has been ordered.  Patient and family aware of the plan for admission and agree.  Blood pressures stable at this time.  Patient is chronically anticoagulated and therefore I think PE is less likely in this patient.  Ultrasound negative for DVT.      I do not think that this represents rib fractures, allergic reaction, acute bacterial pneumonia, decompensated CHF, myocarditis, pericarditis, endocarditis, ACS, PE, ruptured AAA, pneumothorax, aortic dissection, bowel obstruction, or other such etiologies at this time.      COVID-19 PPE worn during patient evaluation:  Mask: surgical  Eye Protection: none   Gown: none   Hair cover: yes  Face shield:  none  Patient wearing a mask: none    At the conclusion of the encounter I discussed the results of all of the tests and the disposition. Their questions were answered. The patient (and any family present) acknowledged understanding and were agreeable with the care plan.      CRITICAL CARE TIME     Total critical care time: 60 minutes  Critical care time was exclusive of separately billable procedures and treating other patients.  Critical care was necessary to treat or prevent imminent or life-threatening deterioration of the following conditions: rapid atrial flutter on IV meds  Critical care was time spent personally by me on the following activities: development of treatment plan with patient or surrogate, discussions with consultants, examination of patient, evaluation of patient's response to treatment, obtaining history from patient or surrogate, ordering and performing treatments and interventions, ordering and review of laboratory studies, ordering and review of radiographic studies and re-evaluation of patient's condition, this excludes any separately billable procedures.      CONSULTANTS:  Cardiolgoy - Dr. Schwarz        MEDICATIONS GIVEN IN THE EMERGENCY:  Medications   diltiazem (CARDIZEM) 125 mg in dextrose 5 % 125 mL infusion (15 mg/hr Intravenous Rate/Dose Change 5/24/23 1437)   ipratropium - albuterol 0.5 mg/2.5 mg/3 mL (DUONEB) neb solution 3 mL (has no administration in time range)   digoxin (LANOXIN) injection 250 mcg (has no administration in time range)   0.9% sodium chloride BOLUS (0 mLs Intravenous Stopped 5/24/23 1124)   ipratropium - albuterol 0.5 mg/2.5 mg/3 mL (DUONEB) neb solution 3 mL (3 mLs Nebulization $Given 5/24/23 1008)   methylPREDNISolone sodium succinate (solu-MEDROL) injection 125 mg (125 mg Intravenous $Given 5/24/23 1027)   diltiazem (CARDIZEM) injection 5 mg (5 mg Intravenous $Given 5/24/23 1033)   magnesium sulfate 2 g in 50 mL sterile water intermittent infusion (0 g  "Intravenous Stopped 5/24/23 1321)   diltiazem (CARDIZEM) injection 10 mg (10 mg Intravenous $Given 5/24/23 1133)   diltiazem (CARDIZEM) injection 15 mg (15 mg Intravenous $Given 5/24/23 1217)           NEW PRESCRIPTIONS STARTED AT TODAY'S ER VISIT     Medication List      There are no discharge medications for this visit.             CONDITION:  stable        DISPOSITION:  Card tele ip as accepted by Dr. Rayo Mendoza, hospitalist         =================================================================  =================================================================  TRIAGE ASSESSMENT:  Pt presents with shortness of breath x 2 days. History of COPD. Has home O2. Uusually on 1L. Arrived without O2. SpO2 89% on room air. History of A-Fib. Took Diltiazem this morning. Heart rate noted to be 175 via Pulse Ox. Denies chest pain     Triage Assessment     Row Name 05/24/23 0936       Triage Assessment (Adult)    Airway WDL WDL       Respiratory WDL    Respiratory WDL X;rhythm/pattern    Rhythm/Pattern, Respiratory shortness of breath       Skin Circulation/Temperature WDL    Skin Circulation/Temperature WDL WDL       Cardiac WDL    Cardiac WDL X;rhythm    Cardiac Rhythm ST       Peripheral/Neurovascular WDL    Peripheral Neurovascular WDL WDL       Cognitive/Neuro/Behavioral WDL    Cognitive/Neuro/Behavioral WDL WDL                   ED Triage Vitals [05/24/23 0935]   Enc Vitals Group      /69      Pulse (!) 175      Resp 20      Temp 97.9  F (36.6  C)      Temp src Temporal      SpO2 (!) 89 %      Weight 68 kg (150 lb)      Height 1.676 m (5' 6\")          ================================================================  ================================================================    HPI    Patient information was obtained from: patient and     Use of Intrepreter: N/A     Mary Kay ARAMBULA Terrell is a 73 year old female with history of COPD, peripheral edema, diabetes, hypertension, fibromyalgia who presents to " the ER with complaints of shortness of breath.    Beginning yesterday she had increasing shortness of breath.  She has been using her albuterol inhaler more frequently than usual.  She does not have a nebulizer.  She also complains of an increasing congested cough.  She feels like she would feel much better she was able to fully clear her cough but states she is having difficulty doing that.    She otherwise denies any fevers, chest pain, vomiting or diarrhea.  She always has a little bit of abdominal discomfort but states it is not any worse or different than usual.    Patient is oxygen dependent and is usually on 2-2.5 L.      REVIEW OF SYSTEMS  Review of Systems   Constitutional: Negative for fever.   Respiratory: Positive for cough and shortness of breath.    Cardiovascular: Positive for leg swelling (chronic and unchanged). Negative for chest pain.   Gastrointestinal: Positive for abdominal pain (chronic and unchanged). Negative for diarrhea, nausea and vomiting.   Genitourinary: Negative for dysuria.   All other systems reviewed and are negative.          PAST MEDICAL HISTORY:  Past Medical History:   Diagnosis Date     Anemia      Aortic stenosis      Atrial fibrillation (H)      Atrial flutter (H)      Benign neoplasm of adenomatous polyp     large intestine      Chronic constipation      Chronic pain syndrome      COPD (chronic obstructive pulmonary disease) (H)     Oxygen at night      Dependence on supplemental oxygen     Oxygen at noc, during the day as needed     Depression      Diabetes mellitus (H)      Dry eye syndrome      Fibromyalgia      Ganglion     left wrist     GERD (gastroesophageal reflux disease)      Hyperlipidemia      Hypertension      Hypokalemia      Infective otitis externa, unspecified     Created by Conversion      Larynx edema      Lung disease      Malignant neoplasm of vulva (H)     Created by Conversion [facility] Annotation: Apr 17 2007  8:24AM - Cammy Bui:   resection per Dr. Alfonso Mane 9/06;  Replacement Utility updated for latest IMO load     Medial epicondylitis      Onychomycosis      Osteoarthritis      Peptic ulcer      Polyneuropathy      Vulvar malignant neoplasm (H)          PAST SURGICAL HISTORY:  Past Surgical History:   Procedure Laterality Date     BIOPSY BREAST Right      BIOPSY BREAST Right 01/28/2015     BIOPSY BREAST Right 1/28/2015    Procedure: RIGHT BREAST BIOPSY AFTER WIRE LOCALIZATION AT 0940;  Surgeon: Renée Soriano MD;  Location: [facility];  Service:      BIOPSY OF BREAST, INCISIONAL      Description: Incisional Breast Biopsy;  Recorded: 11/13/2007;  Comments: benign     COLONOSCOPY N/A 6/14/2019    Procedure: COLONOSCOPY;  Surgeon: Eduardo Mora MD;  Location: [facility];  Service: Gastroenterology     ESOPHAGOSCOPY, GASTROSCOPY, DUODENOSCOPY (EGD), COMBINED N/A 11/6/2018    Procedure: ESOPHAGOGASTRODUODENOSCOPY;  Surgeon: Lit Fernando MD;  Location: [facility];  Service:      HYSTERECTOMY       JOINT REPLACEMENT Left     TKA     PICC TRIPLE LUMEN PLACEMENT  1/12/2023          [place] ABLATE HEART DYSRHYTHM FOCUS      Description: Catheter Ablation Atrial Fibrillation;  Recorded: 07/31/2012;  Comments: 7/24/12 PVI with Dr. Gardiner and nilay to all 5 pulm veins and CTI fl ablation line as well.     ZZC SUPRACERV ABD HYSTERECTOMY      Description: Supracervical Hysterectomy;  Proc Date: 01/01/1985;  Comments: some cervix left!; ovaries intact; done for bleeding         CURRENT MEDICATIONS:    Prior to Admission medications    Medication Sig Start Date End Date Taking? Authorizing Provider   ACCU-CHEK SOFTCLIX LANCETS lancets [ACCU-CHEK SOFTCLIX LANCETS LANCETS] TEST THREE TIMES DAILY 8/28/18   Cammy Bui MD   albuterol (PROAIR HFA/PROVENTIL HFA/VENTOLIN HFA) 108 (90 Base) MCG/ACT inhaler INHALE 2 PUFFS BY MOUTH EVERY 4 HOURS AS NEEDED FOR WHEEZING 5/20/22   Cammy Bui MD  "  apixaban ANTICOAGULANT (ELIQUIS) 5 MG tablet Take 1 tablet (5 mg) by mouth 2 times daily 3/21/23   Cammy Bui MD   atorvastatin (LIPITOR) 20 MG tablet TAKE 1 TABLET(20 MG) BY MOUTH AT BEDTIME 10/9/22   Cmamy Bui MD   BD ULTRA-FINE SHORT PEN NEEDLE 31 gauge x 5/16\" Ndle [BD ULTRA-FINE SHORT PEN NEEDLE 31 GAUGE X 5/16\" NDLE] TEST FOUR TIMES DAILY WITH MEALS AND AT BEDTIME 7/24/19   Cammy Bui MD   blood-glucose meter (ONETOUCH VERIO IQ METER) Misc [BLOOD-GLUCOSE METER (ONETOUCH VERIO IQ METER) MISC] Check blood sugar three times a day. 10/30/19   Cammy Bui MD   budesonide-formoterol (SYMBICORT) 160-4.5 MCG/ACT Inhaler Inhale 2 puffs into the lungs 2 times daily 3/22/23   Cammy Bui MD   cefdinir (OMNICEF) 300 MG capsule Take 1 capsule (300 mg) by mouth 2 times daily 5/1/23   Cammy Bui MD   diaper,brief,adult,disposable (ADULT BRIEFS - LARGE) Misc [DIAPER,BRIEF,ADULT,DISPOSABLE (ADULT BRIEFS - LARGE) MISC] Use 3-4 daily as needed for incontinence 8/16/19   Cammy Bui MD   diltiazem ER COATED BEADS (CARDIZEM CD/CARTIA XT) 180 MG 24 hr capsule TAKE 1 CAPSULE(180 MG) BY MOUTH DAILY 3/11/23   Cammy Bui MD   fluticasone-vilanterol (BREO ELLIPTA) 200-25 MCG/ACT inhaler Inhale 1 puff into the lungs daily 3/21/23   Cammy Bui MD   furosemide (LASIX) 40 MG tablet Take 1 tablet (40 mg) by mouth 2 times daily 3/21/23   Cammy Bui MD   gabapentin (NEURONTIN) 600 MG tablet Take 1 tablet (600 mg) by mouth 3 times daily 3/21/23   Cammy Bui MD   generic lancets (FINGERSTIX LANCETS) [GENERIC LANCETS (FINGERSTIX LANCETS)] Dispense brand per patient's insurance at pharmacy discretion. 3/5/19   Cammy Bui MD   guaiFENesin-codeine (ROBITUSSIN AC) 100-10 MG/5ML solution Take 5-10 mLs by mouth every " 4 hours as needed for cough 5/1/23   Cammy Bui MD   ipratropium - albuterol 0.5 mg/2.5 mg/3 mL (DUONEB) 0.5-2.5 (3) MG/3ML neb solution Take 1 vial (3 mLs) by nebulization every 4 hours as needed for shortness of breath / dyspnea or wheezing 10/31/22   Cammy Bui MD   magnesium oxide (MAG-OX) 400 MG tablet Take 1 tablet (400 mg) by mouth daily 2/9/23   Angela Kaiser MD   meclizine (ANTIVERT) 25 MG tablet TAKE 1 TABLET(25 MG) BY MOUTH THREE TIMES DAILY AS NEEDED FOR DIZZINESS OR NAUSEA 10/28/22   Cammy Bui MD   metFORMIN (GLUCOPHAGE) 500 MG tablet TAKE 1 TABLET(500 MG) BY MOUTH TWICE DAILY WITH MEALS 2/19/23   Cammy Bui MD   mometasone-formoterol (DULERA) 200-5 MCG/ACT inhaler Inhale 2 puffs into the lungs 2 times daily 2/13/23   Cammy Bui MD   naloxone (NARCAN) 4 MG/0.1ML nasal spray Spray 1 spray (4 mg) into one nostril alternating nostrils once as needed for opioid reversal every 2-3 minutes until assistance arrives 2/21/22   Cammy Bui MD   nicotine (NICOTROL) 10 MG inhaler Use 1 cartridge as needed for urge to smoke by puffing over course of 20min.  Use 6-16 cart/day; reduce number of cart/day over 6-12 weeks. 4/25/23   Cammy Bui MD   nystatin (NYSTOP) powder [NYSTATIN (NYSTOP) POWDER] Apply 1 application topically 2 (two) times a day as needed. 2-3 times to affected area(s). 7/21/20   Cammy Bui MD   omeprazole (PRILOSEC) 20 MG DR capsule Take 20 mg by mouth daily    Unknown, Entered By History   ONETOUCH VERIO IQ test strip USE TO TEST THREE TIMES DAILY 4/20/23   Cammy Bui MD   oxyCODONE IR (ROXICODONE) 10 MG tablet Take 1 tablet (10 mg) by mouth every 6 hours as needed for moderate to severe pain 4/25/23   Cammy Bui MD   potassium chloride ER (KLOR-CON M) 20 MEQ CR tablet Take 2 tablets (40 mEq) by  "mouth daily 1/20/23   Louis Dutta MD   sucralfate (CARAFATE) 1 GM tablet TAKE 1 TABLET BY MOUTH FOUR TIMES DAILY. CRUSH TABLET AND MIX IT WITH A LITTLE WATER THEN SWALLOW 1/24/23   Cammy Bui MD         ALLERGIES:  Allergies   Allergen Reactions     Celebrex [Celecoxib] Rash     patient had butterfly rash - \"lupus-like\"       Latex Rash         FAMILY HISTORY:  Family History   Problem Relation Age of Onset     Heart Failure Mother      Cancer Other         paternal HX-laryngeal      Alcoholism Sister      No Known Problems Daughter      No Known Problems Maternal Grandmother      No Known Problems Maternal Grandfather      No Known Problems Paternal Grandmother      No Known Problems Paternal Grandfather      No Known Problems Maternal Aunt      No Known Problems Paternal Aunt      Alcoholism Sister      Alcoholism Brother      Alcoholism Father      Cancer Paternal Uncle         Gastric-Alcohol     Cancer Paternal Uncle         gastric-Alcohol     Hereditary Breast and Ovarian Cancer Syndrome No family hx of      Breast Cancer No family hx of      Colon Cancer No family hx of      Endometrial Cancer No family hx of      Ovarian Cancer No family hx of          SOCIAL HISTORY:  Social History     Socioeconomic History     Marital status:      Spouse name: None     Number of children: None     Years of education: None     Highest education level: None   Tobacco Use     Smoking status: Every Day     Packs/day: 0.25     Types: Cigarettes     Smokeless tobacco: Never     Tobacco comments:     seen by TTS inpatient on 3/31/22   Substance and Sexual Activity     Alcohol use: Yes     Comment: Alcoholic Drinks/day: very little     Drug use: No         VITALS:  Patient Vitals for the past 24 hrs:   BP Temp Temp src Pulse Resp SpO2 Height Weight   05/24/23 1524 -- -- -- -- -- 93 % -- --   05/24/23 1515 102/65 -- -- (!) 163 29 91 % -- --   05/24/23 1459 115/64 -- -- (!) 170 30 (!) 75 % -- -- " "  05/24/23 1444 112/69 -- -- (!) 164 29 95 % -- --   05/24/23 1437 -- -- -- -- -- 94 % -- --   05/24/23 1415 114/75 -- -- (!) 170 24 92 % -- --   05/24/23 1355 112/80 -- -- (!) 166 28 93 % -- --   05/24/23 1340 123/77 -- -- (!) 163 21 94 % -- --   05/24/23 1325 114/72 -- -- (!) 163 22 94 % -- --   05/24/23 1315 123/63 -- -- (!) 163 27 95 % -- --   05/24/23 1245 115/63 -- -- (!) 156 -- 92 % -- --   05/24/23 1215 124/69 -- -- (!) 159 19 95 % -- --   05/24/23 1158 127/62 -- -- (!) 161 23 93 % -- --   05/24/23 1147 -- -- -- (!) 159 -- -- -- --   05/24/23 1145 132/62 -- -- (!) 157 19 93 % -- --   05/24/23 1130 133/63 -- -- (!) 161 20 95 % -- --   05/24/23 1115 134/62 -- -- (!) 159 21 96 % -- --   05/24/23 1104 135/89 -- -- (!) 173 24 (!) 86 % -- --   05/24/23 1031 106/74 -- -- (!) 170 15 93 % -- --   05/24/23 1030 117/78 -- -- (!) 170 10 93 % -- --   05/24/23 1023 -- -- -- (!) 171 30 -- -- --   05/24/23 1008 -- -- -- 85 19 98 % -- --   05/24/23 1000 112/78 -- -- (!) 141 20 93 % -- --   05/24/23 0954 -- -- -- -- -- 94 % -- --   05/24/23 0953 112/72 -- -- 107 26 94 % -- --   05/24/23 0938 -- -- -- -- -- 91 % -- --   05/24/23 0935 111/69 97.9  F (36.6  C) Temporal (!) 175 20 (!) 89 % 1.676 m (5' 6\") 68 kg (150 lb)       Wt Readings from Last 3 Encounters:   05/24/23 68 kg (150 lb)   02/09/23 67 kg (147 lb 11.3 oz)   02/06/23 65.3 kg (144 lb)       Estimated Creatinine Clearance: 69.9 mL/min (based on SCr of 0.77 mg/dL).    PHYSICAL EXAM    Constitutional:  Well developed, Well nourished, NAD, GCS 15  HENT:  Normocephalic, Atraumatic, Bilateral external ears normal, Nose normal. Neck- Supple, No stridor.   Eyes:  PERRL, EOMI, Conjunctiva normal, No discharge.  Respiratory:  Decreased breath sounds, No respiratory distress, No wheezing, Speaks full sentences easily. No cough.   Cardiovascular:  Normal heart rate, Regular rhythm,  No rubs, No gallops.   GI:  No excessive obesity.  Bowel sounds normal, Soft, No tenderness, No " masses, No flank tenderness. No rebound or guarding.  : deferred  Musculoskeletal: 2+ DP pulses. +BLE pitting edema.  No cyanosis, No clubbing. Good range of motion in all major joints. No major deformities noted.   Integument:  Warm, Dry, No erythema, No rash.  No petechiae.   Neurologic:  Alert & oriented x 3  Psychiatric:  Affect normal, Cooperative         LAB:  All pertinent labs reviewed and interpreted.  Recent Results (from the past 24 hour(s))   ECG 12-LEAD WITH MUSE ([facility])    Collection Time: 05/24/23  9:41 AM   Result Value Ref Range    Systolic Blood Pressure  mmHg    Diastolic Blood Pressure  mmHg    Ventricular Rate 163 BPM    Atrial Rate 352 BPM    CT Interval  ms    QRS Duration 90 ms     ms    QTc 457 ms    P Axis  degrees    R AXIS -60 degrees    T Axis 141 degrees    Interpretation ECG       Atrial flutter with variable A-V block  Left axis deviation  Anterior infarct (cited on or before 08-FEB-2023)  ST & T wave abnormality, consider lateral ischemia  Abnormal ECG  When compared with ECG of 08-FEB-2023 10:27,  Significant changes have occurred  Confirmed by SEE ED PROVIDER NOTE FOR, ECG INTERPRETATION ([id number]),  MARLEY ORO ([id number]) on 5/24/2023 9:49:38 AM     Basic metabolic panel    Collection Time: 05/24/23 10:15 AM   Result Value Ref Range    Sodium 140 136 - 145 mmol/L    Potassium 3.8 3.5 - 5.0 mmol/L    Chloride 97 (L) 98 - 107 mmol/L    Carbon Dioxide (CO2) 28 22 - 31 mmol/L    Anion Gap 15 5 - 18 mmol/L    Urea Nitrogen 24 8 - 28 mg/dL    Creatinine 0.77 0.60 - 1.10 mg/dL    Calcium 8.9 8.5 - 10.5 mg/dL    Glucose 117 70 - 125 mg/dL    GFR Estimate 81 >60 mL/min/1.73m2   Troponin I (now)    Collection Time: 05/24/23 10:15 AM   Result Value Ref Range    Troponin I 0.08 0.00 - 0.29 ng/mL   B-Type Natriuretic Peptide ([facility] Only)    Collection Time: 05/24/23 10:15 AM   Result Value Ref Range     (H) 0 - 130 pg/mL   Procalcitonin    Collection Time: 05/24/23 10:15 AM    Result Value Ref Range    Procalcitonin 0.06 0.00 - 0.49 ng/mL   Lactic acid whole blood    Collection Time: 05/24/23 10:15 AM   Result Value Ref Range    Lactic Acid 1.3 0.7 - 2.0 mmol/L   Magnesium    Collection Time: 05/24/23 10:15 AM   Result Value Ref Range    Magnesium 1.5 (L) 1.8 - 2.6 mg/dL   TSH with free T4 reflex    Collection Time: 05/24/23 10:15 AM   Result Value Ref Range    TSH 1.17 0.30 - 5.00 uIU/mL   Extra Blue Top Tube    Collection Time: 05/24/23 10:15 AM   Result Value Ref Range    Hold Specimen JIC    Extra Purple Top Tube    Collection Time: 05/24/23 10:15 AM   Result Value Ref Range    Hold Specimen     CBC with platelets and differential    Collection Time: 05/24/23 10:15 AM   Result Value Ref Range    WBC Count 8.5 4.0 - 11.0 10e3/uL    RBC Count 3.95 3.80 - 5.20 10e6/uL    Hemoglobin 7.9 (L) 11.7 - 15.7 g/dL    Hematocrit 28.5 (L) 35.0 - 47.0 %    MCV 72 (L) 78 - 100 fL    MCH 20.0 (L) 26.5 - 33.0 pg    MCHC 27.7 (L) 31.5 - 36.5 g/dL    RDW 21.7 (H) 10.0 - 15.0 %    Platelet Count 438 150 - 450 10e3/uL    % Neutrophils 85 %    % Lymphocytes 7 %    % Monocytes 7 %    % Eosinophils 0 %    % Basophils 0 %    % Immature Granulocytes 1 %    NRBCs per 100 WBC 0 <1 /100    Absolute Neutrophils 7.2 1.6 - 8.3 10e3/uL    Absolute Lymphocytes 0.6 (L) 0.8 - 5.3 10e3/uL    Absolute Monocytes 0.6 0.0 - 1.3 10e3/uL    Absolute Eosinophils 0.0 0.0 - 0.7 10e3/uL    Absolute Basophils 0.0 0.0 - 0.2 10e3/uL    Absolute Immature Granulocytes 0.1 <=0.4 10e3/uL    Absolute NRBCs 0.0 10e3/uL   Hepatic function panel    Collection Time: 05/24/23 10:15 AM   Result Value Ref Range    Bilirubin Total 1.3 (H) 0.0 - 1.0 mg/dL    Bilirubin Direct 0.5 <=0.5 mg/dL    Protein Total 6.8 6.0 - 8.0 g/dL    Albumin 3.1 (L) 3.5 - 5.0 g/dL    Alkaline Phosphatase 129 (H) 45 - 120 U/L    AST 34 0 - 40 U/L    ALT 35 0 - 45 U/L   Lipase    Collection Time: 05/24/23 10:15 AM   Result Value Ref Range    Lipase 11 0 - 52 U/L    Symptomatic Influenza A/B, RSV, & SARS-CoV2 PCR (COVID-19) Nasopharyngeal    Collection Time: 05/24/23 10:20 AM    Specimen: Nasopharyngeal; Swab   Result Value Ref Range    Influenza A PCR Negative Negative    Influenza B PCR Negative Negative    RSV PCR Negative Negative    SARS CoV2 PCR Negative Negative   Troponin I (now)    Collection Time: 05/24/23  1:22 PM   Result Value Ref Range    Troponin I 0.07 0.00 - 0.29 ng/mL       Lab Results   Component Value Date    ABORH O POS 12/13/2018           RADIOLOGY:  Reviewed all pertinent imaging. Please see official radiology report.    Chest XR,  PA & LAT   Final Result   IMPRESSION:       There is a small to moderate right pleural effusion. Adjacent opacity in the basal right chest consistent with multisegment lower/middle lobe atelectasis, similar to February 2023. There is minimal pleural fluid in the left posterior costophrenic sulcus.      No new, superimposed interstitial or alveolar lung opacities.      The lower right heart border remains silhouetted. No change in mildly enlarged cardiac silhouette. Vascular pedicle width is normal.      Degenerative osteophytes of the thoracic spine and right glenohumeral osteoarthrosis is again noted.      US Lower Extremity Venous Duplex Bilateral   Final Result   IMPRESSION:   1.  No deep venous thrombosis in the bilateral lower extremities.   2.  Mildly complex right popliteal fossa Baker's cyst.            EKG:    Indication: SOB, tachycardia  Performed at: 09:41am  Impression: Narrow complex tachycardia.  Pretty regular though no obvious regular P waves.  Suggestive of atrial flutter which patient has a history of.  QRS 90 ms, QTc 457 ms.  No obvious ST elevations.  Flipped T waves in lead I, aVL.  Atrial flutter has replaced sinus rhythm back compared to EKG from February 8, 2023.    Indication: SOB, tachycardia  Performed at: 13:15p  Impression: Atrial flutter at 141 bpm.   ms, QTc 505 ms.  Flipped T waves in  1, aVL and V2.  Nonspecific ST changes compared to EKG done in the ER earlier today.    I have independently reviewed and interpreted the EKG(s) documented above.        PROCEDURES:  none    Medical Decision Making    History:    Supplemental history from: Documented in chart, if applicable and Family Member/Significant Other    External Record(s) reviewed: Documented in chart, if applicable.    Work Up:    Chart documentation includes differential considered and any EKGs or imaging independently interpreted by provider, where specified.    In additional to work up documented, I considered the following work up: Documented in chart, if applicable.    External consultation:    Discussion of management with another provider: Documented in chart, if applicable    Complicating factors:    Care impacted by chronic illness: Anticoagulated State, Chronic Lung Disease, Diabetes and Heart Disease    Care affected by social determinants of health: N/A    Disposition considerations: Admit.    Sanam Sneed M.D. [facility]  Emergency Medicine and Medical Toxicology  Formerly [facility] EMERGENCY ROOM  [facility] [facility] [id number] [phone]  Dept: [phone]           Sanam Sneed MD  05/24/23 1533

## 2023-05-25 ENCOUNTER — PATIENT OUTREACH (OUTPATIENT)
Dept: GERIATRIC MEDICINE | Facility: CLINIC | Age: 73
End: 2023-05-25

## 2023-05-25 ENCOUNTER — APPOINTMENT (OUTPATIENT)
Dept: CARDIOLOGY | Facility: CLINIC | Age: 73
DRG: 308 | End: 2023-05-25
Attending: INTERNAL MEDICINE
Payer: COMMERCIAL

## 2023-05-25 LAB
ANION GAP SERPL CALCULATED.3IONS-SCNC: 8 MMOL/L (ref 5–18)
BUN SERPL-MCNC: 31 MG/DL (ref 8–28)
CALCIUM SERPL-MCNC: 9.1 MG/DL (ref 8.5–10.5)
CHLORIDE BLD-SCNC: 99 MMOL/L (ref 98–107)
CO2 SERPL-SCNC: 29 MMOL/L (ref 22–31)
CREAT SERPL-MCNC: 0.86 MG/DL (ref 0.6–1.1)
ERYTHROCYTE [DISTWIDTH] IN BLOOD BY AUTOMATED COUNT: 21.5 % (ref 10–15)
GFR SERPL CREATININE-BSD FRML MDRD: 71 ML/MIN/1.73M2
GLUCOSE BLD-MCNC: 189 MG/DL (ref 70–125)
GLUCOSE BLDC GLUCOMTR-MCNC: 170 MG/DL (ref 70–99)
GLUCOSE BLDC GLUCOMTR-MCNC: 196 MG/DL (ref 70–99)
GLUCOSE BLDC GLUCOMTR-MCNC: 216 MG/DL (ref 70–99)
GLUCOSE BLDC GLUCOMTR-MCNC: 237 MG/DL (ref 70–99)
HBA1C MFR BLD: 6.1 %
HCT VFR BLD AUTO: 26.2 % (ref 35–47)
HGB BLD-MCNC: 7.1 G/DL (ref 11.7–15.7)
HGB BLD-MCNC: 7.3 G/DL (ref 11.7–15.7)
LVEF ECHO: NORMAL
MAGNESIUM SERPL-MCNC: 1.8 MG/DL (ref 1.8–2.6)
MCH RBC QN AUTO: 20 PG (ref 26.5–33)
MCHC RBC AUTO-ENTMCNC: 27.9 G/DL (ref 31.5–36.5)
MCV RBC AUTO: 72 FL (ref 78–100)
PLATELET # BLD AUTO: 357 10E3/UL (ref 150–450)
POTASSIUM BLD-SCNC: 4 MMOL/L (ref 3.5–5)
RBC # BLD AUTO: 3.65 10E6/UL (ref 3.8–5.2)
SODIUM SERPL-SCNC: 136 MMOL/L (ref 136–145)
WBC # BLD AUTO: 7.6 10E3/UL (ref 4–11)

## 2023-05-25 PROCEDURE — 210N000002 HC R&B HEART CARE

## 2023-05-25 PROCEDURE — 250N000013 HC RX MED GY IP 250 OP 250 PS 637: Performed by: INTERNAL MEDICINE

## 2023-05-25 PROCEDURE — 99233 SBSQ HOSP IP/OBS HIGH 50: CPT | Performed by: STUDENT IN AN ORGANIZED HEALTH CARE EDUCATION/TRAINING PROGRAM

## 2023-05-25 PROCEDURE — 93306 TTE W/DOPPLER COMPLETE: CPT

## 2023-05-25 PROCEDURE — 83036 HEMOGLOBIN GLYCOSYLATED A1C: CPT | Performed by: STUDENT IN AN ORGANIZED HEALTH CARE EDUCATION/TRAINING PROGRAM

## 2023-05-25 PROCEDURE — 99223 1ST HOSP IP/OBS HIGH 75: CPT | Performed by: INTERNAL MEDICINE

## 2023-05-25 PROCEDURE — 83735 ASSAY OF MAGNESIUM: CPT | Performed by: STUDENT IN AN ORGANIZED HEALTH CARE EDUCATION/TRAINING PROGRAM

## 2023-05-25 PROCEDURE — 250N000011 HC RX IP 250 OP 636: Performed by: INTERNAL MEDICINE

## 2023-05-25 PROCEDURE — 250N000012 HC RX MED GY IP 250 OP 636 PS 637: Performed by: STUDENT IN AN ORGANIZED HEALTH CARE EDUCATION/TRAINING PROGRAM

## 2023-05-25 PROCEDURE — 80048 BASIC METABOLIC PNL TOTAL CA: CPT | Performed by: STUDENT IN AN ORGANIZED HEALTH CARE EDUCATION/TRAINING PROGRAM

## 2023-05-25 PROCEDURE — 85027 COMPLETE CBC AUTOMATED: CPT | Performed by: STUDENT IN AN ORGANIZED HEALTH CARE EDUCATION/TRAINING PROGRAM

## 2023-05-25 PROCEDURE — 85018 HEMOGLOBIN: CPT | Performed by: STUDENT IN AN ORGANIZED HEALTH CARE EDUCATION/TRAINING PROGRAM

## 2023-05-25 PROCEDURE — 36415 COLL VENOUS BLD VENIPUNCTURE: CPT | Performed by: STUDENT IN AN ORGANIZED HEALTH CARE EDUCATION/TRAINING PROGRAM

## 2023-05-25 PROCEDURE — 93306 TTE W/DOPPLER COMPLETE: CPT | Mod: 26 | Performed by: INTERNAL MEDICINE

## 2023-05-25 PROCEDURE — 250N000013 HC RX MED GY IP 250 OP 250 PS 637: Performed by: STUDENT IN AN ORGANIZED HEALTH CARE EDUCATION/TRAINING PROGRAM

## 2023-05-25 RX ORDER — GUAIFENESIN 200 MG/10ML
200 LIQUID ORAL EVERY 4 HOURS PRN
Status: DISCONTINUED | OUTPATIENT
Start: 2023-05-25 | End: 2023-06-05 | Stop reason: HOSPADM

## 2023-05-25 RX ORDER — NICOTINE POLACRILEX 4 MG
15-30 LOZENGE BUCCAL
Status: DISCONTINUED | OUTPATIENT
Start: 2023-05-25 | End: 2023-05-25

## 2023-05-25 RX ORDER — PREDNISONE 20 MG/1
20 TABLET ORAL ONCE
Status: COMPLETED | OUTPATIENT
Start: 2023-05-25 | End: 2023-05-25

## 2023-05-25 RX ORDER — GUAIFENESIN 600 MG/1
600 TABLET, EXTENDED RELEASE ORAL 2 TIMES DAILY
Status: DISCONTINUED | OUTPATIENT
Start: 2023-05-25 | End: 2023-05-25

## 2023-05-25 RX ORDER — PREDNISONE 20 MG/1
40 TABLET ORAL DAILY
Status: COMPLETED | OUTPATIENT
Start: 2023-05-26 | End: 2023-05-28

## 2023-05-25 RX ORDER — DILTIAZEM HYDROCHLORIDE 30 MG/1
30 TABLET, FILM COATED ORAL EVERY 8 HOURS
Status: DISCONTINUED | OUTPATIENT
Start: 2023-05-25 | End: 2023-05-30 | Stop reason: ALTCHOICE

## 2023-05-25 RX ORDER — DEXTROSE MONOHYDRATE 25 G/50ML
25-50 INJECTION, SOLUTION INTRAVENOUS
Status: DISCONTINUED | OUTPATIENT
Start: 2023-05-25 | End: 2023-05-25

## 2023-05-25 RX ADMIN — APIXABAN 5 MG: 5 TABLET, FILM COATED ORAL at 07:50

## 2023-05-25 RX ADMIN — FUROSEMIDE 40 MG: 40 TABLET ORAL at 07:50

## 2023-05-25 RX ADMIN — DIGOXIN 250 MCG: 0.25 INJECTION INTRAMUSCULAR; INTRAVENOUS at 07:53

## 2023-05-25 RX ADMIN — GABAPENTIN 600 MG: 600 TABLET, FILM COATED ORAL at 15:35

## 2023-05-25 RX ADMIN — FUROSEMIDE 40 MG: 40 TABLET ORAL at 15:35

## 2023-05-25 RX ADMIN — GABAPENTIN 600 MG: 600 TABLET, FILM COATED ORAL at 07:50

## 2023-05-25 RX ADMIN — DILTIAZEM HYDROCHLORIDE 30 MG: 30 TABLET, FILM COATED ORAL at 18:43

## 2023-05-25 RX ADMIN — INSULIN ASPART 1 UNITS: 100 INJECTION, SOLUTION INTRAVENOUS; SUBCUTANEOUS at 07:50

## 2023-05-25 RX ADMIN — SUCRALFATE 1 G: 1 TABLET ORAL at 11:11

## 2023-05-25 RX ADMIN — GABAPENTIN 600 MG: 600 TABLET, FILM COATED ORAL at 21:39

## 2023-05-25 RX ADMIN — SUCRALFATE 1 G: 1 TABLET ORAL at 07:50

## 2023-05-25 RX ADMIN — PREDNISONE 20 MG: 20 TABLET ORAL at 07:50

## 2023-05-25 RX ADMIN — FLUTICASONE FUROATE AND VILANTEROL TRIFENATATE 1 PUFF: 200; 25 POWDER RESPIRATORY (INHALATION) at 07:50

## 2023-05-25 RX ADMIN — Medication 400 MG: at 07:50

## 2023-05-25 RX ADMIN — APIXABAN 5 MG: 5 TABLET, FILM COATED ORAL at 21:38

## 2023-05-25 RX ADMIN — DILTIAZEM HYDROCHLORIDE 30 MG: 30 TABLET, FILM COATED ORAL at 11:11

## 2023-05-25 RX ADMIN — DOXYCYCLINE 100 MG: 100 CAPSULE ORAL at 06:46

## 2023-05-25 RX ADMIN — DOXYCYCLINE 100 MG: 100 CAPSULE ORAL at 15:35

## 2023-05-25 RX ADMIN — SUCRALFATE 1 G: 1 TABLET ORAL at 15:35

## 2023-05-25 RX ADMIN — PANTOPRAZOLE SODIUM 40 MG: 40 TABLET, DELAYED RELEASE ORAL at 07:50

## 2023-05-25 RX ADMIN — PREDNISONE 20 MG: 20 TABLET ORAL at 11:11

## 2023-05-25 RX ADMIN — SUCRALFATE 1 G: 1 TABLET ORAL at 21:39

## 2023-05-25 RX ADMIN — ATORVASTATIN CALCIUM 20 MG: 10 TABLET, FILM COATED ORAL at 21:39

## 2023-05-25 RX ADMIN — INSULIN ASPART 1 UNITS: 100 INJECTION, SOLUTION INTRAVENOUS; SUBCUTANEOUS at 12:54

## 2023-05-25 ASSESSMENT — ACTIVITIES OF DAILY LIVING (ADL)
ADLS_ACUITY_SCORE: 36
ADLS_ACUITY_SCORE: 36
ADLS_ACUITY_SCORE: 33
ADLS_ACUITY_SCORE: 33
ADLS_ACUITY_SCORE: 37
ADLS_ACUITY_SCORE: 39
ADLS_ACUITY_SCORE: 37
ADLS_ACUITY_SCORE: 36
ADLS_ACUITY_SCORE: 37
ADLS_ACUITY_SCORE: 33
ADLS_ACUITY_SCORE: 33
ADLS_ACUITY_SCORE: 36

## 2023-05-25 NOTE — PROGRESS NOTES
[facility]    Medicine Progress Note - Hospitalist Service    Date of Admission:  5/24/2023    Assessment & Plan       Mary Kay Tejada is a 73 year old female admitted on 5/24/2023. She has a past medical history of paroxysmal atrial flutter on apixaban, COPD, hyperlipidemia, diabetes mellitus type 2 with neuropathy, reflux, dizziness, tobacco use, fibromyalgia and chronic pain syndrome, who presents with shortness of breath and elevated heart rate in the 170s range.    On admission was tachycardic 170, otherwise stable vitals.  BMP nonremarkable, magnesium low 1.5, , CXR shows small to moderate right pleural effusion. Adjacent opacity in the basal right chest consistent with multisegment lower/middle lobe atelectasis, similar to February 2023. There is minimal pleural fluid in the left posterior costophrenic sulcus; lactate 1.3, procalcitonin negative, negative troponin, negative for DVT on US; EKG shows atrial flutter. Patient was given extra diltiazem; admitted and cardiology consulted. Given steroid burst (only 3 days initially) and doxycycline for anti-inflammatory (noted long QTc so avoiding azithromycin). Also hgb noted at 7.9 but no symptoms though prior in February was 9.2. On telemetry.  Following the hemoglobin for anemia serially. Admission diagnoses: atrial flutter, copd exacerbation, and anemia.    On 5/25, remains rate controlled and transitioning to po digoxin. Increasing steroid burst to 40mg po every day; serially following hgb given downward trend and reports of darker colored but non-melanotic stools.       - - - - -   Atrial flutter   Shortness of breath  Long QTc  Assessment: improving on digoxin; now transition to po per cards 5/25  Plan:   - consult to cardiology, appreciate   - digoxin per cardiology  - telemetry  - monitor clinically  - continue apixaban   - avoid QTc-prolonging medications (ie ordering doxycycline instead of azithromycin)     COPD  exacerbation  Acute respiratory failure with hypoxia  Assessment: required 5L on admit; her baseline is 2.5 L PTA.  Suspect the exacerbation could be a trigger exacerbating the above primary issue, aflutter.  Discussed with the patient and her son about steroids and anti-inflammatories.  Given the long QTc we will start with doxycycline for anti-inflammatory antibiotic. Home regimen of ProAir and DuoNeb and Breo. Does not appears overtly volume up and CXR effusions are stable in comparison to prior. Her 02 needs are labile, from 4-7L just 5/25-- increasing steroids.   Plan:   - started steroids: low dose 20mg daily   - on 5/25/2023 increase to 40mg po daily  - continue doxycycline 100mg po bid, x3 days in total (completing 5/26)  - continue home medications  - duonebs, adjust as per RT  - 02 and wean as able back to baseline of 2.5L NC  - continue PTA lasix    Hx of heart failure with preserved ejection fraction  A- small-moderate right sided effusion and minimal of left, otherwise does not appear overtly volume up; had thoracentesis in January. BNP not overtly elevated on admit at 176 vs previously 867 in February. No rales ore extremity edema.   P:  - continue home lasix  - I/Os, weights  - BNP was 176 vs 867 in February  - will discuss further with pt about possibility of thoracentesis vs further treatment of the copd exacerbation and re-assessing    Anemia, microcytic   Hx of gastric ulcer  Assessment: The delta is concerning with hemoglobin at 7.9 though earlier was 9.2 in February.  However, no signs or symptoms concerning for bleed, no melena or hematochezia or hematemesis.  She does have a prior history of prior ulcerations though, so need to carefully monitor-- now downward trending; she does report some abdominal discomfort and some 'dark stools' but not 'black or tarry.' Consent obtained 5/25 AM in case requires transfusion.   Plan:  -Serially follow hemoglobin, ordered for every 8 hours  - see below  regarding continuing reflux meds  - transfuse if hgb < 7    - consent obtained for pRBC transfusions     Reflux  A/P-see issue above as well, continue PPI Carafate     Hyperlipidemia  A/P-continue statin    Diabetes mellitus type 2 with neuropathy  Assessment: On metformin, has neuropathy. Sugars above goal, increasing scale 5/25  Plan:  - add on Hgb A1c  -Hypoglycemia protocol  -Hold metformin while admitted to the hospital  -Sliding scale insulin, INCREASE to Medium dose   -Continue gabapentin     Hypomagnesemia  A/p- replacement protocol; continue home magnesium tomorrow    Hypertension  A- pressures are soft. On diltizem for both htn and aflutter. On diuretic, given hypoxia, will continue for now.   P:  - on diltizaem drip in E  - continue PTA lasix for now     Dizziness  A/p- stable but can continue PRN meclizine     Tobacco use  A/p- encouraged decreasing and eventual cessation; continue nicotine inhaler     fibromyalgia   chronic pain syndrome  Assessment: Currently pain stable on admission.  She is on a regimen of oxycodone.  Plan:  -Continue for now but modified with a lower range available: 5-10mg q6 hrs prn (instead of scheduled 10)            Diet: Combination Diet Low Saturated Fat Na <2400mg Diet, No Caffeine Diet    DVT Prophylaxis: Pneumatic Compression Devices, Ambulate every shift and no pharmaceutical given worsening anemia   Chairez Catheter: Not present  Lines: None     Cardiac Monitoring: ACTIVE order. Indication: Tachyarrhythmias, acute (48 hours)  Code Status: Full Code      Clinically Significant Risk Factors Present on Admission             # Hypomagnesemia: Lowest Mg = 1.5 mg/dL in last 2 days, will replace as needed   # Hypoalbuminemia: Lowest albumin = 3.1 g/dL at 5/24/2023 10:15 AM, will monitor as appropriate  # Drug Induced Coagulation Defect: home medication list includes an anticoagulant medication    # Hypertension: Noted on problem list   # Acute Respiratory Failure: Documented O2  saturation < 91%.  Continue supplemental oxygen as needed              Disposition Plan     Expected Discharge Date: 05/26/2023      Destination: home with family;home with help/services            Rayo Mendoza MD  Hospitalist Service  [facility]  Securely message with [employer] (more info)  Text page via [employer] with [place] Paging/Directory   ______________________________________________________________________    Interval History   - no acute events  - did not sleep much  - feels her breathing is subjectively better but on more 02 this AM at 6L vs 5L on admit; baseline 2.5L  - no new pain or concerns  - discussed cardiology digoxin plans and also her hgb and concern given prior hx of gastric ulcer  - she denied any melena or hematochezia or hematemesis but notes 'darker colored' stools recently   - she does have some abdominal discomfort though not tender on exam     Physical Exam   Vital Signs: Temp: 98.4  F (36.9  C) Temp src: Oral BP: 114/62 Pulse: 83   Resp: 20 SpO2: 92 % O2 Device: Nasal cannula Oxygen Delivery: 4 LPM  Weight: 142 lbs 3.2 oz    General: alert, oriented, and in no acute distress  Pulmonary: coarse, some decreased breath sounds, but no rales or wheezes; on 6L in AM  CVS: flutter on telemetry; irregularly irregular, no murmurs, rubs, or gallops; no blatant jugular venous distention; no extremity edema and extremities are warm to the touch  GI: soft, nontender, BS+, no rebound or guarding, no conspicuous organomegaly   Neuro: nonfocal, moves all extremities of own volition  Psych: appropriate       Medical Decision Making       56 MINUTES SPENT BY ME on the date of service doing chart review, history, exam, documentation & further activities per the note.      Data   ------------------------- PAST 24 HR DATA REVIEWED -----------------------------------------------    I have personally reviewed the following data over the past 24 hrs:    7.6  \   7.3 (L)   / 357     136 99 31 (H) /   216 (H)   4.0 29 0.86 \       Imaging results reviewed over the past 24 hrs:   No results found for this or any previous visit (from the past 24 hour(s)).

## 2023-05-25 NOTE — PLAN OF CARE
Goal Outcome Evaluation:       No acute changes today. Pt remains in aflutter, heart rate controlled in 80s throughout day. Transitioned to PO dilt today per cardiology, stopped IV drip. Weaned to 3L O2 via nasal cannula, close to pt's baseline which she states is 2.5 L. Does continues to desat with activity to low 80s but recovers with rest. Up to bedside commode and chair with standby assist. Denies any pain.    Trending hemoglobin 7.3--> 7.1 this evening.    Overall Patient Progress: improving     Problem: Dysrhythmia  Goal: Normalized Cardiac Rhythm  Outcome: Progressing

## 2023-05-25 NOTE — PROGRESS NOTES
TRANSITIONS OF CARE (LETICIA) LOG   LETICIA tasks should be completed by the CC within one (1) business day of notification of each transition. Follow up contact with member is required after return to their usual care setting.  Note:  If CC finds out about the transitions fifteen (15) days or more after the member has returned to their usual care setting, no LETICIA log is needed. However, the CC should check in with the member to discuss the transition process, any changes needed to the care plan and document it in a case note.    Member Name:  Mary Kay Tejada MCO Name:  [employer] Member ID#: [id number]   [place] [facility] [place] Contact:  Samantha Alexandra RN, PHN Agency/County/Care System: [facility]   Transition Communication Actions from Care Management Contact   Transition #1   Notification Date: 5/25/23 Transition Date:   5/24/23 Transition From: Home     Is this the member s usual care setting?               yes Transition To: [facility], [facility]   Transition Type:  Unplanned  Reason for Admission/Comments:  Shortness of breath  Contact member/responsible party to offer assistance with transition Date completed: 5/25/23    Notes from conversation with the member/responsible party, provider, discharging and receiving facility (as applicable):   Date 5/25/23:CC contacted Hospital /discharge planner via the [employer] Transitional Care Hand-In Process, with community care plan included.  CC reached out to member regarding transition and left a message requesting a return call.  Reviewed and update care plan as needed.  Notified community service providers and placed services Homemaking PCA on hold as needed.  Transition log initiated.   PCP, Cammy Bui, notified of hospitalization via EMR.       Shared CC contact info, care plan/services with receiving setting--Date completed: 5/25/23   Name & Title of receiving setting contact: [facility]   Notified PCP of transition--Date completed:  5/25/23     via  EMR  Name of PCP: Cammy Bui     Transition #2   Notification Date: 6/6/23 Transition Date:   6/5/23 Transition From: [facility], [facility]     Is this the member s usual care setting?               no Transition To: Home   Transition Type:  Planned  Reason for Admission/Comments:    See information below    Contact member/responsible party to offer assistance with transition Date completed: NA    Notes from conversation with the member/responsible party, provider, discharging and receiving facility (as applicable):   Date NA:     Shared CC contact info, care plan/services with receiving setting--Date completed: NA   Name & Title of receiving setting contact: NA   Notified PCP of transition--Date completed:  NA     via    Name of PCP: N/A               *RETURN TO USUAL CARE SETTING: *Complete tasks below when the member is discharging TO their usual care setting within one (1) business day of notification..      For situations where the Care Coordinator is notified of the discharge prior to the date of discharge, the Care Coordinator must follow up with the member or designated representative to confirm that discharge actually occurred and discuss required LETICIA tasks as outlined in the LETICIA Instructions.  (This includes situations where it may be a  new  usual care setting for the member. (i.e., a community member who decides upon permanent nursing home placement following hospitalization and rehab).    Discuss with Member/Responsible Party:    Check  Yes  - if the member, family member and/or SNF/facility staff manages the following:    If  No  provide explanation in the comments section.          Date completed: 6/8/23 Communicated with member or their designated representative about the following:  care transition process; about changes to the member s health status; plan of care  updates; education about transitions and how to prevent unplanned transitions/readmissions    Four Pillars for Optimal Transition:    Check  Yes  - if the member, family member and/or SNF/facility staff manages the following:    If  No  provide explanation in the comments section.          [x]  Yes     []  No Does the member have a follow-up appointment scheduled with primary care or specialist? (Mental health hospitalizations--the appt. should be w/in 7 days)              For mental health hospitalizations:  []  Yes     []  No     Does the member have a follow-up appointment scheduled with a mental health practitioner within 7 days of discharge?  [x]  Yes     []  No     Has a medication review been completed with member? If no, refer to PCP, home care nurse, MTM, pharmacist  [x]  Yes     []  No     Can the member manage their medications or is there a system in place to manage medications (e.g. home care set-up)?         [x]  Yes     []  No     Can the member verbalize warning signs and symptoms to watch for and how to respond?  [x]  Yes     []  No     Does the member have a copy of and understand their discharge instructions?  If no, assist to obtain copy of discharge instructions, review discharge instructions, and assist to contact PCP to discuss questions about their recent hospitalization.  [x]  Yes     []  No     Does the member have adequate food, housing and transportation?  If no, add goal and discuss additional supports available to the member                                                                                                                                                                                 [x]  Yes     []  No     Is the member safe in their home?  If no, document needs and support provided                                                                                                                                                                          []  Yes     [x]  No      Are there any concerns of vulnerability, abuse, or neglect?  If yes, document concerns and actions taken by Care Coordinator as a mandated                                                                                                                                                                              [x]  Yes     []  No     Does the member use a Personal Health Care Record?  Check  Yes  if visit summary, discharge summary, and/or healthcare summary are being used as a PHR.                                                                                                                                                                                  [x]  Yes     []  No     Have you reviewed the discharge summary with the member? If  No  provide explanation in comments.  [x]  Yes     []  No     Have you updated the member s care plan/support plan? Add new diagnosis, medications, treatments, goals & interventions, as applicable. If No, provide explanation in comments.    Comments:           6/6/23: Attempted to reach member, not available and left voice mail.     Notes from conversation with the member/responsible party, provider, discharging and receiving facility (as applicable):   6/8/23: CC contacted member and reviewed discharge summary.  Member has a follow-up appointment with PCP in 7 days: Yes: scheduled on 6/13/23   Member has had a change in condition: No significant change in condition, however left leg is still swollen. She elevates leg and following a low sodium diet. She is checking her weight daily in the morning. She is aware when to contact PCP.  Home visit needed: No  Care plan reviewed and updated.  The following home based services PCA and homemaking services were resumed.  New referrals placed: No     Member would like samples of pads. CC will request.    Transition log completed.     PCP, Cammy Bui, notified of transition back to home via EMR.                Samantha Alexandra RN, N   [facility]  [phone]

## 2023-05-25 NOTE — PLAN OF CARE
Pt required between 5-7Liters supplemental O2 to maintain sats above 92%. Denies pain, just difficulty sleeping. Remains on dilt gtt at rate of 5. HR in rate controlled a-flutter with 3-1 conduction. Pt asymptomatic.    Problem: Plan of Care - These are the overarching goals to be used throughout the patient stay.    Goal: Absence of Hospital-Acquired Illness or Injury  Outcome: Progressing  Intervention: Identify and Manage Fall Risk  Recent Flowsheet Documentation  Taken 5/25/2023 0400 by Kari Vidal, RN  Safety Promotion/Fall Prevention:    activity supervised    assistive device/personal items within reach    clutter free environment maintained    lighting adjusted    nonskid shoes/slippers when out of bed    patient and family education    room organization consistent    safety round/check completed    treat reversible contributory factors    treat underlying cause  Taken 5/25/2023 0000 by Kari Vidal, RN  Safety Promotion/Fall Prevention:    activity supervised    assistive device/personal items within reach    clutter free environment maintained    lighting adjusted    nonskid shoes/slippers when out of bed    patient and family education    room organization consistent    safety round/check completed    treat reversible contributory factors    treat underlying cause  Goal: Optimal Comfort and Wellbeing  Outcome: Progressing     Problem: Risk for Delirium  Goal: Optimal Coping  Outcome: Progressing   Goal Outcome Evaluation:

## 2023-05-25 NOTE — UTILIZATION REVIEW
Admission Status; Secondary Review Determination   Under the authority of the Utilization Management Committee, the utilization review process indicated a secondary review on Mary Kay Tejada. The review outcome is based on review of the medical records, discussions with staff, and applying clinical experience noted on the date of the review.   (x) Inpatient Status Appropriate - This patient's medical care is consistent with medical management for inpatient care and reasonable inpatient medical practice.     RATIONALE FOR DETERMINATION   Mary Kay Tejada is a 73 yr old female with COPD chronic oxygen need 2.5L, paroxysmal Afib, HLD, DM2, tobacco use, HFpEF who presented with shortness of breath.  Tachycardia to 170s.  EKG with atrial flutter.  Diltiazem gtt overnight.  Digoxin IV given x 3 doses in addition.  Now attempting to adjust from IV diltiazem to PO with ongoing rate control.  Driving factor for Aflutter with RVR suspected to be due to COPD exacerbation.  Oxygen needs 4-7L NC since presentation.  Started steroids and increasing dose today.  Oxygen titrate as able.  Currently on 4L NC.  Frequent nebs and doxycycline also for treatment.  Not stable for discharge with ongoing variable oxygen needs in setting of COPD exacerbation that has caused atrial fibrillation with RVR.    At the time of admission with the information available to the attending physician more than 2 nights Hospital complex care was anticipated, based on patient risk of adverse outcome if treated as outpatient and complex care required. Inpatient admission is appropriate based on the Medicare guidelines.   The information on this document is developed by the utilization review team in order for the business office to ensure compliance. This only denotes the appropriateness of proper admission status and does not reflect the quality of care rendered.   The definitions of Inpatient Status and Observation Status used in making the  determination above are those provided in the CMS Coverage Manual, Chapter 1 and Chapter 6, section 70.4.   Sincerely,   Zahra Mejia MD  Utilization Review  Physician Advisor  [facility]

## 2023-05-25 NOTE — PROGRESS NOTES
[facility] Care Coordination Contact    [facility]  Ambulatory Care Coordination to Inpatient Care Management   Hand-In Communication    Date:  May 25, 2023  Name: Mary Kay Tejada is enrolled in [facility] Care Coordination program and I am the Lead Care Coordinator.  CC Contact Information:.   Payor Source: Payor: MEDICA / Plan: [employer]ERS / Product Type: Indemnity /   Current services in place:     Please see the CC Snaphot and Care Management Flowsheets for specific  details of this Mary Kay Tejada care plan.   Additional details/specific concerns r/t this admission:    No additional concerns at this time Member has HM and PCA services from [facility].    I will follow this admission in Epic. Please feel free to contact me with questions or for further collaboration in discharge planning.    Samantha Alexandra RN, PHN   [facility]  [phone]

## 2023-05-25 NOTE — CONSULTS
CARDIOLOGY CONSULT NOTE         Assessment:   1.  Atrial flutter, unspecified type (H)-atypical, asymptomatic, work on rate control strategy currently.  We will switch diltiazem drip over to oral.  On anticoagulation although missed a dose.  Given the stability at this point time would not pursue urgent cardioversion and can discuss attempt as an outpatient.  2.  Paroxysmal Atrial Fibrillation-status post pulmonary vein isolation ablation in 2012.  3.  COPD exacerbation (H)-suspect this is her biggest issue, treatment per primary care team.  Needs to discontinue tobacco use.  4.  Type 2 diabetes mellitus with hypoglycemia (H)-so noted and no recent hemoglobin A1c.  5.  Pleural effusion-defer thoracentesis to primary care team.     Plan:   1.  Switch over to oral diltiazem for rate control.  2.  Eliquis by mouth twice daily.  3.  No urgent cardioversion.  4.  Can follow with Dr. Bao Pizano, primary cardiologist.  5.  Agree with echocardiogram     Current History:   [facility] [place] has been requested by Dr. Mendoza To evaluate Mary Kay Tejada  who is a 73 year old year old white female for atrial fibrillation.  Patient lives at home independently with her son, tells me over the last few days she has had increased shortness of breath with a dry cough.  She denies any PND, orthopnea, chest pains or palpitations.  Due to this she presented to the hospital was noted to be in atrial flutter and cardiology consultation requested.  Other than the above she denies palpitations, chest pains, syncope, dizziness or peripheral edema.    Past Medical History:     Past Medical History:   Diagnosis Date     Anemia      Aortic stenosis      Atrial fibrillation (H)      Atrial flutter (H)      Benign neoplasm of adenomatous polyp     large intestine      Chronic constipation      Chronic pain syndrome      COPD (chronic obstructive pulmonary disease) (H)     Oxygen at night      Dependence on supplemental oxygen      Oxygen at noc, during the day as needed     Depression      Diabetes mellitus (H)      Dry eye syndrome      Fibromyalgia      Ganglion     left wrist     GERD (gastroesophageal reflux disease)      Hyperlipidemia      Hypertension      Hypokalemia      Infective otitis externa, unspecified     Created by Conversion      Larynx edema      Medial epicondylitis      Onychomycosis      Osteoarthritis      Peptic ulcer      Polyneuropathy      Vulvar malignant neoplasm (H)       Past history is negative for tuberculosis, myocardial infarction,  rheumatic fever, cerebrovascular accident, chronic kidney disease, peptic ulcer disease, or thyroid disorder.    Past Surgical History:     Past Surgical History:   Procedure Laterality Date     BIOPSY BREAST Right      BIOPSY BREAST Right 01/28/2015     BIOPSY BREAST Right 1/28/2015    Procedure: RIGHT BREAST BIOPSY AFTER WIRE LOCALIZATION AT 0940;  Surgeon: Renée Soriano MD;  Location: [facility] [place];  Service:      BIOPSY OF BREAST, INCISIONAL      Description: Incisional Breast Biopsy;  Recorded: 11/13/2007;  Comments: benign     COLONOSCOPY N/A 6/14/2019    Procedure: COLONOSCOPY;  Surgeon: Eduardo Mora MD;  Location: [facility] [place];  Service: Gastroenterology     ESOPHAGOSCOPY, GASTROSCOPY, DUODENOSCOPY (EGD), COMBINED N/A 11/6/2018    Procedure: ESOPHAGOGASTRODUODENOSCOPY;  Surgeon: Lit Fernando MD;  Location: [facility] [place];  Service:      HYSTERECTOMY       JOINT REPLACEMENT Left     TKA     PICC TRIPLE LUMEN PLACEMENT  1/12/2023          WA ABLATE HEART DYSRHYTHM FOCUS      Description: Catheter Ablation Atrial Fibrillation;  Recorded: 07/31/2012;  Comments: 7/24/12 PVI with Dr. Gardiner and nilay to all 5 pulm veins and CTI fl ablation line as well.     ZC SUPRACERV ABD HYSTERECTOMY      Description: Supracervical Hysterectomy;  Proc Date: 01/01/1985;  Comments: some cervix left!; ovaries intact; done for bleeding     Family History:   Reviewed,  and positive some sort of heart disease in her mother causing her death at a young age.    Social History:   Reviewed, and she  reports that she does still smoke, probably close to 55 to 60 years up to a pack a day but currently less.  She has never used smokeless tobacco. She reports current alcohol use. She reports that she does not use drugs.  She is single, lives independently with her son, primary physician is Dr. Brizuela.    Meds:       apixaban ANTICOAGULANT  5 mg Oral BID     atorvastatin  20 mg Oral At Bedtime     doxycycline hyclate  100 mg Oral BID     fluticasone-vilanterol  1 puff Inhalation Daily     furosemide  40 mg Oral BID     gabapentin  600 mg Oral TID     insulin aspart  1-3 Units Subcutaneous TID AC     insulin aspart  1-3 Units Subcutaneous At Bedtime     magnesium oxide  400 mg Oral Daily     nicotine   Transdermal Q8H     pantoprazole  40 mg Oral Daily     predniSONE  20 mg Oral Daily     sodium chloride (PF)  3 mL Intracatheter Q8H     sucralfate  1 g Oral 4x Daily AC & HS     Allergies:   Celebrex [celecoxib] and Latex    Review of Systems:   A 12 point comprehensive review of systems was  as follows:   General: Positive for fatigue, negative weight loss or weight gain  Constitutional: negative for anorexia, chills, fevers, malaise, night sweats   Eyes: negative for cataracts, color blindness, contacts/glasses, glaucoma, icterus, irritation, redness and visual disturbance  Ears, nose, mouth, throat, and face: negative for ear drainage, earaches, epistaxis, facial trauma, hearing loss, hoarseness, nasal congestion, snoring, sore mouth, sore throat, tinnitus and voice change  Respiratory: Positive for cough, dyspnea on exertion, negative hemoptysis, sputum production, pleurisy, stridor, wheezing, PND, orthopnea  Cardiovascular: negative for chest pain, chest pressure/discomfort, claudication, dyspnea, exertional chest pressure/discomfort, fatigue, irregular heart beat, lower extremity edema,  "near-syncope,  palpitations,  syncope   Gastrointestinal: negative for abdominal pain, change in bowel haibits, constipation, diarrhea, dyspepsia, dysphagia, jaundice, melena, nausea, odynophagia, reflux symptoms and vomiting  Genitourinary: negative for decreased stream  Hematologic/lymphatic: negative for bleeding  Musculoskeletal: Positive for arthralgias, back pain, bone pain, negative muscle weakness, myalgias, neck pain and stiff joints  Neurological: negative for syncope, presyncope, coordination problems, dizziness, gait problems, headaches, memory problems, paresthesia, seizures, speech problems, tremors, vertigo and weakness    Objective:     Physical Exam:  /61 (BP Location: Right arm)   Pulse 83   Temp 97.6  F (36.4  C) (Oral)   Resp 22   Ht 1.676 m (5' 6\")   Wt 64.5 kg (142 lb 3.2 oz)   SpO2 94%   BMI 22.95 kg/m       Head:    Normocephalic, without obvious abnormality, atraumatic   Eyes:    PERRL, conjunctiva/corneas clear, EOM's intact,           Nose:   Nares normal, septum midline, mucosa normal, no drainage  or sinus tenderness   Throat:   Lips, mucosa, and tongue normal; teeth and gums normal   Neck:   Supple, symmetrical, trachea midline, no adenopathy;        thyroid:  No enlargement/tenderness/nodules; no carotid    bruit or JVD   Back:     Symmetric, no curvature, ROM normal, no CVA tenderness   Lungs:     Clear to auscultation bilaterally, respirations unlabored   Chest wall:    No tenderness or deformity   Heart:    Irregularly irregular, S1 and S2 normal, no murmur, rub   or gallop   Abdomen:     Soft, non-tender, bowel sounds active all four quadrants,     no masses, no organomegaly   Extremities:   Extremities normal, atraumatic, no cyanosis or edema   Pulses:   2+ and symmetric all extremities   Skin:   Skin color, texture, turgor normal, no rashes or lesions   Neurologic:   CNII-XII intact. Normal strength, sensation and reflexes       throughout     Cardiographics and " Imaging  Radiology Results: Chest x-ray shows   There is a small to moderate right pleural effusion. Adjacent opacity in the basal right chest consistent with multisegment lower/middle lobe atelectasis, similar to February 2023. There is minimal pleural fluid in the left posterior costophrenic sulcus.   No new, superimposed interstitial or alveolar lung opacities.  The lower right heart border remains silhouetted. No change in mildly enlarged cardiac silhouette. Vascular pedicle width is normal.   Degenerative osteophytes of the thoracic spine and right glenohumeral osteoarthrosis is again noted.    EKG Results: personally reviewed atypical atrial flutter, leftward axis, nonspecific ST-T wave changes.      Lab Review   Pertinent Labs  Lab Results: personally reviewed       Lab Results   Component Value Date    TROPONINI 0.07 05/24/2023       Lab Results   Component Value Date    BUN 31 (H) 05/25/2023     05/25/2023    CO2 29 05/25/2023       Lab Results   Component Value Date    WBC 7.6 05/25/2023    HGB 7.3 (L) 05/25/2023    HCT 26.2 (L) 05/25/2023    MCV 72 (L) 05/25/2023     05/25/2023       Lab Results   Component Value Date     (H) 05/24/2023       Clinically Significant Risk Factors Present on Admission           # Hypomagnesemia: Lowest Mg = 1.5 mg/dL in last 2 days, will replace as needed   # Hypoalbuminemia: Lowest albumin = 3.1 g/dL at 5/24/2023 10:15 AM, will monitor as appropriate   # Drug Induced Coagulation Defect: home medication list includes an anticoagulant medication      Cardiovascular: Cardiac Arrhythmia: Atrial flutter: Atypical    Other Pulmonary Conditions: Pulmonary fibrosis, unspecified

## 2023-05-25 NOTE — PROGRESS NOTES
Pt alert and oriented. Denies chest pain or increased SOB. Currently on 5L via NC. CONTE. HR has been - aflutter. On dilt drip currently running at 10mg/hr. Vitals remain stable. Purewick in place. Will continue to monitor.

## 2023-05-25 NOTE — CONSULTS
Care Management Initial Consult    General Information  Assessment completed with: Patient, Caregiver, Patient and PCA Cynthia at bedside  Type of CM/SW Visit: Initial Assessment    Primary Care Provider verified and updated as needed: Yes   Readmission within the last 30 days: no previous admission in last 30 days      Reason for Consult: discharge planning  Advance Care Planning: Advance Care Planning Reviewed: no concerns identified, other (see comments) (Declined Honoring Choices)          Communication Assessment  Patient's communication style: spoken language (English or Bilingual)    Hearing Difficulty or Deaf: no   Wear Glasses or Blind: yes    Cognitive  Cognitive/Neuro/Behavioral: WDL                      Living Environment:   People in home: child(david), adult     Current living Arrangements: house      Able to return to prior arrangements: yes  Living Arrangement Comments: Lives with son    Family/Social Support:  Care provided by: self, child(david), other (see comments) (PCA)  Provides care for: no one  Marital Status:   Children, PCA          Description of Support System: Supportive, Involved    Support Assessment: Adequate family and caregiver support    Current Resources:   Patient receiving home care services: No     Community Resources: County Worker, DME, Financial/Insurance, Housekeeping/Chore Agency, [employer], PCA  Equipment currently used at home: cane, straight, grab bar, toilet, glucometer, shower chair, walker, standard  Supplies currently used at home: Diabetic Supplies, Oxygen Tubing/Supplies, Nebulizer tubing    Employment/Financial:  Employment Status: retired        Financial Concerns: No concerns identified         Does the patient's insurance plan have a 3 day qualifying hospital stay waiver?  Yes   Will the waiver be used for post-acute placement? No    Lifestyle & Psychosocial Needs:  Social Determinants of Health     Tobacco Use: High Risk (5/24/2023)    Patient History       Smoking Tobacco Use: Every Day      Smokeless Tobacco Use: Never      Passive Exposure: Not on file   Alcohol Use: Not on file   Financial Resource Strain: Not on file   Food Insecurity: Not on file   Transportation Needs: Not on file   Physical Activity: Not on file   Stress: Not on file   Social Connections: Not on file   Intimate Partner Violence: Not on file   Depression: Not at risk (3/1/2023)    PHQ-2      PHQ-2 Score: 0   Housing Stability: Not on file       Functional Status:  Prior to admission patient needed assistance:   Dependent ADLs:: Ambulation-walker, Bathing, Grooming  Dependent IADLs:: Cleaning, Cooking, Laundry, Shopping, Meal Preparation, Medication Management, Transportation  Assesssment of Functional Status: Not at  functional baseline    Mental Health Status:          Chemical Dependency Status:              Values/Beliefs:  Spiritual, Cultural Beliefs, [employer] Practices, Values that affect care:                 Additional Information:   73-year-old female with history of COPD, peripheral edema, diabetes, hypertension, fibromyalgia who presents to the ER with complaints of shortness of breath.  Laboratories overall reassuring.  She is in rapid atrial flutter which has been difficult to control with diltiazem.  Talking with family states she has been is in this now for several weeks with heart rates of 180 at home.  I do believe that there is a component of COPD exacerbation here as well as she does seem to improve with her respiratory status and how she is feeling from a respiratory status with nebs .    Patient lives in a private residence with son Heriberto and his girlfriend Cynthia who is patient s PCA. Has a total of 15.5 PCA and homemaking services hours/week through [facility]. Patient uses oxygen via cannula for sleeping and when up with exertion. Gets her oxygen from [facility]. PCA assists with showers, medications, and transportation as needed. Patient has  a [employer] alert pendant, uses a standard walker and straight cane for ambulation. Plan is to return home with son Heriberto transporting patient on discharge. Other needs pending clinical progress.      CARMEN RAMSEY RN/CM

## 2023-05-26 ENCOUNTER — APPOINTMENT (OUTPATIENT)
Dept: CT IMAGING | Facility: CLINIC | Age: 73
DRG: 308 | End: 2023-05-26
Attending: INTERNAL MEDICINE
Payer: COMMERCIAL

## 2023-05-26 ENCOUNTER — TELEPHONE (OUTPATIENT)
Dept: CARDIOLOGY | Facility: CLINIC | Age: 73
End: 2023-05-26
Payer: COMMERCIAL

## 2023-05-26 LAB
ABO/RH(D): NORMAL
ALBUMIN SERPL-MCNC: 3 G/DL (ref 3.5–5)
ALP SERPL-CCNC: 112 U/L (ref 45–120)
ALT SERPL W P-5'-P-CCNC: 24 U/L (ref 0–45)
ANTIBODY SCREEN: NEGATIVE
AST SERPL W P-5'-P-CCNC: 21 U/L (ref 0–40)
BASOPHILS # BLD AUTO: 0 10E3/UL (ref 0–0.2)
BASOPHILS NFR BLD AUTO: 0 %
BILIRUB DIRECT SERPL-MCNC: 0.4 MG/DL
BILIRUB SERPL-MCNC: 0.9 MG/DL (ref 0–1)
BLD PROD TYP BPU: NORMAL
BLOOD COMPONENT TYPE: NORMAL
CODING SYSTEM: NORMAL
CROSSMATCH: NORMAL
EOSINOPHIL # BLD AUTO: 0 10E3/UL (ref 0–0.7)
EOSINOPHIL NFR BLD AUTO: 0 %
ERYTHROCYTE [DISTWIDTH] IN BLOOD BY AUTOMATED COUNT: 22.9 % (ref 10–15)
GLUCOSE BLDC GLUCOMTR-MCNC: 144 MG/DL (ref 70–99)
GLUCOSE BLDC GLUCOMTR-MCNC: 155 MG/DL (ref 70–99)
GLUCOSE BLDC GLUCOMTR-MCNC: 216 MG/DL (ref 70–99)
GLUCOSE BLDC GLUCOMTR-MCNC: 232 MG/DL (ref 70–99)
HCT VFR BLD AUTO: 30.2 % (ref 35–47)
HGB BLD-MCNC: 6.9 G/DL (ref 11.7–15.7)
HGB BLD-MCNC: 8.7 G/DL (ref 11.7–15.7)
HGB BLD-MCNC: 9.3 G/DL (ref 11.7–15.7)
IMM GRANULOCYTES # BLD: 0.1 10E3/UL
IMM GRANULOCYTES NFR BLD: 1 %
ISSUE DATE AND TIME: NORMAL
LDH SERPL L TO P-CCNC: 307 U/L (ref 125–220)
LYMPHOCYTES # BLD AUTO: 0.6 10E3/UL (ref 0.8–5.3)
LYMPHOCYTES NFR BLD AUTO: 6 %
MAGNESIUM SERPL-MCNC: 1.6 MG/DL (ref 1.8–2.6)
MCH RBC QN AUTO: 21.4 PG (ref 26.5–33)
MCHC RBC AUTO-ENTMCNC: 28.8 G/DL (ref 31.5–36.5)
MCV RBC AUTO: 74 FL (ref 78–100)
MONOCYTES # BLD AUTO: 0.7 10E3/UL (ref 0–1.3)
MONOCYTES NFR BLD AUTO: 7 %
NEUTROPHILS # BLD AUTO: 9.4 10E3/UL (ref 1.6–8.3)
NEUTROPHILS NFR BLD AUTO: 86 %
NRBC # BLD AUTO: 0 10E3/UL
NRBC BLD AUTO-RTO: 0 /100
PATH REPORT.COMMENTS IMP SPEC: NORMAL
PATH REPORT.FINAL DX SPEC: NORMAL
PATH REPORT.MICROSCOPIC SPEC OTHER STN: NORMAL
PATH REPORT.RELEVANT HX SPEC: NORMAL
PLATELET # BLD AUTO: 341 10E3/UL (ref 150–450)
PROT SERPL-MCNC: 6.6 G/DL (ref 6–8)
RBC # BLD AUTO: 4.07 10E6/UL (ref 3.8–5.2)
RETICS # AUTO: 0.09 10E6/UL (ref 0.01–0.11)
RETICS/RBC NFR AUTO: 2.2 % (ref 0.8–2.7)
SPECIMEN EXPIRATION DATE: NORMAL
UNIT ABO/RH: NORMAL
UNIT NUMBER: NORMAL
UNIT STATUS: NORMAL
UNIT TYPE ISBT: 5100
WBC # BLD AUTO: 10.8 10E3/UL (ref 4–11)

## 2023-05-26 PROCEDURE — 86850 RBC ANTIBODY SCREEN: CPT | Performed by: STUDENT IN AN ORGANIZED HEALTH CARE EDUCATION/TRAINING PROGRAM

## 2023-05-26 PROCEDURE — 83550 IRON BINDING TEST: CPT | Performed by: INTERNAL MEDICINE

## 2023-05-26 PROCEDURE — 85060 BLOOD SMEAR INTERPRETATION: CPT | Performed by: PATHOLOGY

## 2023-05-26 PROCEDURE — 86923 COMPATIBILITY TEST ELECTRIC: CPT | Performed by: HOSPITALIST

## 2023-05-26 PROCEDURE — 83735 ASSAY OF MAGNESIUM: CPT | Performed by: STUDENT IN AN ORGANIZED HEALTH CARE EDUCATION/TRAINING PROGRAM

## 2023-05-26 PROCEDURE — 80076 HEPATIC FUNCTION PANEL: CPT | Performed by: STUDENT IN AN ORGANIZED HEALTH CARE EDUCATION/TRAINING PROGRAM

## 2023-05-26 PROCEDURE — 83615 LACTATE (LD) (LDH) ENZYME: CPT | Performed by: STUDENT IN AN ORGANIZED HEALTH CARE EDUCATION/TRAINING PROGRAM

## 2023-05-26 PROCEDURE — 85018 HEMOGLOBIN: CPT | Performed by: STUDENT IN AN ORGANIZED HEALTH CARE EDUCATION/TRAINING PROGRAM

## 2023-05-26 PROCEDURE — 83010 ASSAY OF HAPTOGLOBIN QUANT: CPT | Performed by: STUDENT IN AN ORGANIZED HEALTH CARE EDUCATION/TRAINING PROGRAM

## 2023-05-26 PROCEDURE — 99232 SBSQ HOSP IP/OBS MODERATE 35: CPT | Performed by: INTERNAL MEDICINE

## 2023-05-26 PROCEDURE — 250N000011 HC RX IP 250 OP 636: Performed by: STUDENT IN AN ORGANIZED HEALTH CARE EDUCATION/TRAINING PROGRAM

## 2023-05-26 PROCEDURE — 250N000012 HC RX MED GY IP 250 OP 636 PS 637: Performed by: STUDENT IN AN ORGANIZED HEALTH CARE EDUCATION/TRAINING PROGRAM

## 2023-05-26 PROCEDURE — 71275 CT ANGIOGRAPHY CHEST: CPT | Mod: 26 | Performed by: INTERNAL MEDICINE

## 2023-05-26 PROCEDURE — 99233 SBSQ HOSP IP/OBS HIGH 50: CPT | Performed by: STUDENT IN AN ORGANIZED HEALTH CARE EDUCATION/TRAINING PROGRAM

## 2023-05-26 PROCEDURE — 82728 ASSAY OF FERRITIN: CPT | Performed by: INTERNAL MEDICINE

## 2023-05-26 PROCEDURE — 250N000013 HC RX MED GY IP 250 OP 250 PS 637: Performed by: INTERNAL MEDICINE

## 2023-05-26 PROCEDURE — 36415 COLL VENOUS BLD VENIPUNCTURE: CPT | Performed by: STUDENT IN AN ORGANIZED HEALTH CARE EDUCATION/TRAINING PROGRAM

## 2023-05-26 PROCEDURE — 999N000127 HC STATISTIC PERIPHERAL IV START W US GUIDANCE

## 2023-05-26 PROCEDURE — 85045 AUTOMATED RETICULOCYTE COUNT: CPT | Performed by: STUDENT IN AN ORGANIZED HEALTH CARE EDUCATION/TRAINING PROGRAM

## 2023-05-26 PROCEDURE — 74174 CTA ABD&PLVS W/CONTRAST: CPT | Mod: 26 | Performed by: INTERNAL MEDICINE

## 2023-05-26 PROCEDURE — 210N000002 HC R&B HEART CARE

## 2023-05-26 PROCEDURE — 250N000011 HC RX IP 250 OP 636: Performed by: INTERNAL MEDICINE

## 2023-05-26 PROCEDURE — P9016 RBC LEUKOCYTES REDUCED: HCPCS | Performed by: HOSPITALIST

## 2023-05-26 PROCEDURE — 74174 CTA ABD&PLVS W/CONTRAST: CPT

## 2023-05-26 PROCEDURE — 86901 BLOOD TYPING SEROLOGIC RH(D): CPT | Performed by: STUDENT IN AN ORGANIZED HEALTH CARE EDUCATION/TRAINING PROGRAM

## 2023-05-26 PROCEDURE — 250N000013 HC RX MED GY IP 250 OP 250 PS 637: Performed by: STUDENT IN AN ORGANIZED HEALTH CARE EDUCATION/TRAINING PROGRAM

## 2023-05-26 RX ORDER — IOPAMIDOL 755 MG/ML
100 INJECTION, SOLUTION INTRAVASCULAR ONCE
Status: COMPLETED | OUTPATIENT
Start: 2023-05-26 | End: 2023-05-26

## 2023-05-26 RX ORDER — FUROSEMIDE 10 MG/ML
40 INJECTION INTRAMUSCULAR; INTRAVENOUS ONCE
Status: COMPLETED | OUTPATIENT
Start: 2023-05-26 | End: 2023-05-26

## 2023-05-26 RX ADMIN — GABAPENTIN 600 MG: 600 TABLET, FILM COATED ORAL at 14:47

## 2023-05-26 RX ADMIN — GABAPENTIN 600 MG: 600 TABLET, FILM COATED ORAL at 10:07

## 2023-05-26 RX ADMIN — PANTOPRAZOLE SODIUM 40 MG: 40 TABLET, DELAYED RELEASE ORAL at 10:06

## 2023-05-26 RX ADMIN — SUCRALFATE 1 G: 1 TABLET ORAL at 13:27

## 2023-05-26 RX ADMIN — APIXABAN 5 MG: 5 TABLET, FILM COATED ORAL at 10:06

## 2023-05-26 RX ADMIN — PREDNISONE 40 MG: 20 TABLET ORAL at 10:06

## 2023-05-26 RX ADMIN — SUCRALFATE 1 G: 1 TABLET ORAL at 10:06

## 2023-05-26 RX ADMIN — DILTIAZEM HYDROCHLORIDE 30 MG: 30 TABLET, FILM COATED ORAL at 02:06

## 2023-05-26 RX ADMIN — IOPAMIDOL 100 ML: 755 INJECTION, SOLUTION INTRAVENOUS at 14:58

## 2023-05-26 RX ADMIN — FLUTICASONE FUROATE AND VILANTEROL TRIFENATATE 1 PUFF: 200; 25 POWDER RESPIRATORY (INHALATION) at 07:45

## 2023-05-26 RX ADMIN — DOXYCYCLINE 100 MG: 100 CAPSULE ORAL at 17:43

## 2023-05-26 RX ADMIN — EMPAGLIFLOZIN 10 MG: 10 TABLET, FILM COATED ORAL at 13:27

## 2023-05-26 RX ADMIN — DILTIAZEM HYDROCHLORIDE 30 MG: 30 TABLET, FILM COATED ORAL at 10:07

## 2023-05-26 RX ADMIN — DOXYCYCLINE 100 MG: 100 CAPSULE ORAL at 05:45

## 2023-05-26 RX ADMIN — FUROSEMIDE 40 MG: 10 INJECTION, SOLUTION INTRAVENOUS at 13:27

## 2023-05-26 RX ADMIN — APIXABAN 5 MG: 5 TABLET, FILM COATED ORAL at 20:54

## 2023-05-26 RX ADMIN — SUCRALFATE 1 G: 1 TABLET ORAL at 20:54

## 2023-05-26 RX ADMIN — DILTIAZEM HYDROCHLORIDE 30 MG: 30 TABLET, FILM COATED ORAL at 17:43

## 2023-05-26 RX ADMIN — ATORVASTATIN CALCIUM 20 MG: 10 TABLET, FILM COATED ORAL at 20:54

## 2023-05-26 RX ADMIN — SUCRALFATE 1 G: 1 TABLET ORAL at 17:43

## 2023-05-26 RX ADMIN — Medication 400 MG: at 10:07

## 2023-05-26 RX ADMIN — GABAPENTIN 600 MG: 600 TABLET, FILM COATED ORAL at 20:54

## 2023-05-26 RX ADMIN — FUROSEMIDE 40 MG: 40 TABLET ORAL at 17:43

## 2023-05-26 ASSESSMENT — ACTIVITIES OF DAILY LIVING (ADL)
ADLS_ACUITY_SCORE: 35
ADLS_ACUITY_SCORE: 33
ADLS_ACUITY_SCORE: 35
ADLS_ACUITY_SCORE: 33

## 2023-05-26 NOTE — PLAN OF CARE
Goal Outcome Evaluation:      Problem: Plan of Care - These are the overarching goals to be used throughout the patient stay.    Goal: Optimal Comfort and Wellbeing  5/26/2023 1819 by Bridgett Covington RN  Outcome: Progressing     Problem: Gas Exchange Impaired  Goal: Optimal Gas Exchange  Outcome: Progressing     Problem: Dysrhythmia  Goal: Normalized Cardiac Rhythm  5/26/2023 1819 by Bridgett Covington RN  Outcome: Progressing  Patient is up with a standby assist. Tolerating activity well at this time. Patient does report some dyspnea with exertion. Able to titrate oxygen down to 3L via nasal cannula throughout day. Patient received x1 unit of PRBCs today - last hgb was 9.3. x1 dose of IV lasix given after unit of blood today. CT angiogram TAVR. Call light placed within reach and purposeful rounding performed. Bridgett Covington RN

## 2023-05-26 NOTE — PROGRESS NOTES
Valve clinic referral for aortic stenosis per Dr. Loya. Chart reviewed. TAVR CTA ordered to be completed while inpatient at .    Jennifer Ospina RN BSN  Structural Heart Coordinator   [facility]  [phone]

## 2023-05-26 NOTE — TELEPHONE ENCOUNTER
Ammy,    Please schedule pt for valve clinic consult. I will place an order for inpatient CTA.    Thanks,  Jennifer

## 2023-05-26 NOTE — PLAN OF CARE
Pt alert and oriented, pleasant. Remains in rate controlled A-Fib. She expressed some worry about potential to need a blood transfusion but was reassured after process was explained. Some dyspnea on exertion but recovers with rest.     Problem: Plan of Care - These are the overarching goals to be used throughout the patient stay.    Goal: Absence of Hospital-Acquired Illness or Injury  Outcome: Progressing  Intervention: Identify and Manage Fall Risk  Recent Flowsheet Documentation  Taken 5/26/2023 0000 by Kari Vidal RN  Safety Promotion/Fall Prevention:    activity supervised    assistive device/personal items within reach    clutter free environment maintained    lighting adjusted    nonskid shoes/slippers when out of bed    patient and family education    safety round/check completed    treat reversible contributory factors    treat underlying cause  Taken 5/25/2023 2000 by Kari Vidal, RN  Safety Promotion/Fall Prevention:    activity supervised    assistive device/personal items within reach    clutter free environment maintained    lighting adjusted    nonskid shoes/slippers when out of bed    patient and family education    safety round/check completed    treat reversible contributory factors    treat underlying cause  Intervention: Prevent Skin Injury  Recent Flowsheet Documentation  Taken 5/26/2023 0000 by Kari Vidal, RN  Body Position: position changed independently  Taken 5/25/2023 2000 by Kari Vidal, RN  Body Position: position changed independently  Goal: Optimal Comfort and Wellbeing  Outcome: Progressing     Problem: Risk for Delirium  Goal: Optimal Coping  Outcome: Progressing   Goal Outcome Evaluation:

## 2023-05-26 NOTE — PROGRESS NOTES
Care Management Follow Up    Length of Stay (days): 2    Expected Discharge Date: 05/26/2023     Concerns to be Addressed: discharge planning     Patient plan of care discussed at interdisciplinary rounds: Yes    Anticipated Discharge Disposition: Home Care, Home     Anticipated Discharge Services: County Worker, Housekeeping/[employer], PCA  Anticipated Discharge DME: Oxygen    Patient/family educated on Medicare website which has current facility and service quality ratings:  No   Education Provided on the Discharge Plan:  No  Patient/Family in Agreement with the Plan: yes    Referrals Placed by CM/SW:  None  Private pay costs discussed: Not applicable    Additional Information:  Chart reviewed.  Anticipating no discharge needs at this time. Patient lives in a private residence with son Heriberto and his girlfriend Cynthia who is patient s PCA. Has a total of 15.5 PCA and homemaking services hours/week through [facility]. Patient uses oxygen via cannula for sleeping and when up with exertion  from [facility].   Family to transport.     LUZ Toro

## 2023-05-26 NOTE — PROGRESS NOTES
CARDIOLOGY PROGRESS NOTE      Assessment/Plan:  1.  Atrial flutter (H)-atypical, asymptomatic, work on rate control strategy currently. Rate controlled on oral meds currently On anticoagulation although missed a dose.  Given the stability at this point not pursuing urgent cardioversion and can discuss attempt as an outpatient.  2.  Aortic stenosis-severe with peak velocity of 4.3 m/s, mean gradient of 47 mmHg.  We will need to have angiography and evaluate for TAVR.  3.  Paroxysmal Atrial Fibrillation-status post pulmonary vein isolation ablation in .  4.  COPD exacerbation (H)-suspect this is her biggest issue, treatment per primary care team.  Needs to discontinue tobacco use.  5.  Type 2 diabetes mellitus with hypoglycemia (H)-so noted and no recent hemoglobin A1c.  Given heart failure and preserved creatinine we will try Jardiance.  6.  Pleural effusion-defer thoracentesis to primary care team.  7.  Anemia- hemoglobin significantly down to 6.9.  Receiving blood today.  Agree with IV diuretic after this.  Defer to primary care team  8.  Chronic pain syndrome-so noted and defer to primary care team, felt secondary to Fibromyalgia.    Plan:  1.  We will add some Jardiance.  2.  Will involve TAVR team in anticipation of meeting them in the future, possibly get CTA while here.    Discharge Plannin.  No real cardiac barrier to discharge, cardiology most likely will sign off tomorrow.  2.  Can follow with Dr. Bao Pizano primary cardiologist     LOS: 2 days     Subjective:  Interval History:    72-year-old white female being seen on third day of hospitalization.  She feels hot from the heat blanket.  Denies any chest pains, palpitations, significant shortness of breath, PND, orthopnea, syncope, dizziness.  She does have some mild bipedal edema and is worried about receiving blood.    Medications    apixaban ANTICOAGULANT  5 mg Oral BID     atorvastatin  20 mg Oral At Bedtime     diltiazem  30 mg Oral Q8H  "    doxycycline hyclate  100 mg Oral BID     fluticasone-vilanterol  1 puff Inhalation Daily     furosemide  40 mg Oral BID     gabapentin  600 mg Oral TID     insulin aspart  1-7 Units Subcutaneous TID AC     insulin aspart  1-5 Units Subcutaneous At Bedtime     magnesium oxide  400 mg Oral Daily     nicotine   Transdermal Q8H     pantoprazole  40 mg Oral Daily     predniSONE  40 mg Oral Daily     sodium chloride (PF)  3 mL Intracatheter Q8H     sucralfate  1 g Oral 4x Daily AC & HS     Objective:   Vital signs in last 24 hours:  Temp:  [97.5  F (36.4  C)-98.5  F (36.9  C)] 97.9  F (36.6  C)  Pulse:  [] 85  Resp:  [20-24] 20  BP: (109-122)/(54-62) 114/57  SpO2:  [89 %-97 %] 97 %    Physical Exam:  /57 (BP Location: Right arm)   Pulse 85   Temp 97.9  F (36.6  C) (Oral)   Resp 20   Ht 1.676 m (5' 6\")   Wt 64.4 kg (142 lb)   SpO2 97%   BMI 22.92 kg/m      General Appearance:    Alert, cooperative, no distress, appears stated age   Head:    Normocephalic, without obvious abnormality, atraumatic   Throat:   Lips, mucosa, and tongue normal; teeth and gums normal   Neck:   Supple, symmetrical, trachea midline, no adenopathy;        thyroid:  No enlargement/tenderness/nodules; no carotid    bruit or JVD   Back:     Symmetric, no curvature, ROM normal, no CVA tenderness   Lungs:     Clear to auscultation bilaterally, respirations unlabored   Chest wall:    No tenderness or deformity   Heart:    Irregularly irregular, S1 and S2 normal, 2/6 systolic ejection murmur, mid peaking, no rub   or gallop   Abdomen:     Soft, non-tender, bowel sounds active all four quadrants,     no masses, no organomegaly   Extremities:   Normal, atraumatic, no cyanosis, 1/4 bipedal edema   Pulses:   2+ and symmetric all extremities   Skin:   Skin color, texture, turgor normal, no rashes or lesions     Cardiographics:      ECG: Personally reviewed by myself shows atypical atrial flutter, leftward axis, nonspecific ST-T wave " changes.    Echocardiogram:   Compared to the prior study dated 1/11/23, there are changes as noted. Aortic stenosis is now severe.  The left ventricle is normal in size. Left ventricular function is normal.The ejection fraction is 60-65%.  The right ventricle is mildly dilated.  The left atrium is mildly dilated.  Severe valvular aortic stenosis. There is mild to moderate (1-2+) aortic regurgitation.  Small pericardial effusion      Lab Results:   Recent Labs   Lab 05/26/23  0528 05/25/23  1806 05/25/23  0507   WBC  --   --  7.6   HGB 6.9*   < > 7.3*   HCT  --   --  26.2*   PLT  --   --  357    < > = values in this interval not displayed.     Recent Labs   Lab 05/25/23  0507      CO2 29   BUN 31*   .       Lab Results   Component Value Date    TROPONINI 0.07 05/24/2023

## 2023-05-26 NOTE — PROGRESS NOTES
"CLINICAL NUTRITION SERVICES - ASSESSMENT NOTE     Nutrition Prescription    RECOMMENDATIONS FOR MDs/PROVIDERS TO ORDER:  None    Malnutrition Status:    Moderate malnutrition in the context of chronic illness    Recommendations already ordered by Registered Dietitian (RD):  None    Future/Additional Recommendations:  Monitor oral intake and weight trends     REASON FOR ASSESSMENT  Mary Kay Tejada is a/an 73 year old female assessed by the dietitian for Admission Nutrition Risk Screen for weight loss and decreased appetite    Mary Kay Tejada is a 73 year old female admitted on 5/24/2023. She has a past medical history of paroxysmal atrial flutter on apixaban, COPD, hyperlipidemia, diabetes mellitus type 2 with neuropathy, reflux, dizziness, tobacco use, fibromyalgia and chronic pain syndrome, who presents with shortness of breath and elevated heart rate in the 170s range.    NUTRITION HISTORY  Patient reports that she follows a low sodium diet closely at home and her daughter helps her cook and shop. Her appetite and oral intake has been declining over the past 6 months.     She drinks ensure at home but does not want any during her hospital stay. We discussed food and diet recommendations and RD answered some diet questions that the patient had.     CURRENT NUTRITION ORDERS  Diet: Low Saturated Fat/2400 mg Sodium and no caffeine  Intake/Tolerance: % intake noted. Patient is ordering 3 nutritionally adequate meals per day.     LABS  Labs reviewed     MEDICATIONS  Medications reviewed - lipitor, lasix, novolog, mag-ox, protonix, prednisone, carafate    ANTHROPOMETRICS  Height: 167.6 cm (5' 6\")  Most Recent Weight: 64.4 kg (142 lb)    IBW: 59 kg  BMI: Normal BMI  Weight History:   Wt Readings from Last 10 Encounters:   05/26/23 64.4 kg (142 lb)   02/09/23 67 kg (147 lb 11.3 oz)   02/06/23 65.3 kg (144 lb)   01/19/23 65.7 kg (144 lb 14.4 oz)   09/27/22 70.1 kg (154 lb 8 oz)   08/24/22 68.2 kg (150 lb 4 oz) "   07/27/22 68.9 kg (152 lb)   07/25/22 69.2 kg (152 lb 9.6 oz)   06/24/22 68.9 kg (151 lb 12.8 oz)   05/25/22 71 kg (156 lb 9.6 oz)   Dosing Weight: 64 kg    ASSESSED NUTRITION NEEDS  Estimated Energy Needs: 7038-9223 kcals/day (25 - 30 kcals/kg)  Justification: Maintenance  Estimated Protein Needs: 64-77 grams protein/day (1 - 1.2 grams of pro/kg)  Justification: Maintenance  Estimated Fluid Needs: (1 mL/kcal)   Justification: Maintenance    MALNUTRITION  % Intake: < 75% for >/= 3 months (moderate)  % Weight Loss: Weight loss does not meet criteria  Subcutaneous Fat Loss: None observed  Muscle Loss: Temporal: moderate and Thoracic region (clavicle, acromium bone, deltoid, trapezius, pectoral): moderate to severe  Fluid Accumulation/Edema: Does not meet criteria - trace  Malnutrition Diagnosis: Moderate malnutrition in the context of chronic illness    NUTRITION DIAGNOSIS  Malnutrition related to poor appetite as evidenced by moderate to severe muscle loss and decreased oral intake    INTERVENTIONS  Implementation  None    Goals  Patient to consume % of nutritionally adequate meal trays TID, or the equivalent with supplements/snacks.     Monitoring/Evaluation  Monitor oral intake and weight trends  Waleska Elise RDN, LD

## 2023-05-26 NOTE — TELEPHONE ENCOUNTER
----- Message from Ugo Loya MD sent at 5/26/2023  9:46 AM CDT -----  Lady with severe aortic stenosis, certainly not a surgical candidate, in the hospital, can we evaluate for TAVR?  While here feel free to order CTA.  LF

## 2023-05-27 LAB
FERRITIN SERPL-MCNC: 130 NG/ML (ref 11–328)
GLUCOSE BLDC GLUCOMTR-MCNC: 152 MG/DL (ref 70–99)
GLUCOSE BLDC GLUCOMTR-MCNC: 187 MG/DL (ref 70–99)
GLUCOSE BLDC GLUCOMTR-MCNC: 227 MG/DL (ref 70–99)
GLUCOSE BLDC GLUCOMTR-MCNC: 251 MG/DL (ref 70–99)
HGB BLD-MCNC: 8.9 G/DL (ref 11.7–15.7)
HGB BLD-MCNC: 9.3 G/DL (ref 11.7–15.7)
IRON BINDING CAPACITY (ROCHE): 375 UG/DL (ref 240–430)
IRON SATN MFR SERPL: 3 % (ref 15–46)
IRON SERPL-MCNC: 13 UG/DL (ref 37–145)
MAGNESIUM SERPL-MCNC: 1.7 MG/DL (ref 1.8–2.6)

## 2023-05-27 PROCEDURE — 250N000013 HC RX MED GY IP 250 OP 250 PS 637: Performed by: INTERNAL MEDICINE

## 2023-05-27 PROCEDURE — 210N000002 HC R&B HEART CARE

## 2023-05-27 PROCEDURE — 250N000011 HC RX IP 250 OP 636: Performed by: INTERNAL MEDICINE

## 2023-05-27 PROCEDURE — 99232 SBSQ HOSP IP/OBS MODERATE 35: CPT | Performed by: INTERNAL MEDICINE

## 2023-05-27 PROCEDURE — 99223 1ST HOSP IP/OBS HIGH 75: CPT | Performed by: INTERNAL MEDICINE

## 2023-05-27 PROCEDURE — 99233 SBSQ HOSP IP/OBS HIGH 50: CPT | Performed by: STUDENT IN AN ORGANIZED HEALTH CARE EDUCATION/TRAINING PROGRAM

## 2023-05-27 PROCEDURE — 36415 COLL VENOUS BLD VENIPUNCTURE: CPT | Performed by: STUDENT IN AN ORGANIZED HEALTH CARE EDUCATION/TRAINING PROGRAM

## 2023-05-27 PROCEDURE — 85018 HEMOGLOBIN: CPT | Performed by: STUDENT IN AN ORGANIZED HEALTH CARE EDUCATION/TRAINING PROGRAM

## 2023-05-27 PROCEDURE — 250N000012 HC RX MED GY IP 250 OP 636 PS 637: Performed by: STUDENT IN AN ORGANIZED HEALTH CARE EDUCATION/TRAINING PROGRAM

## 2023-05-27 PROCEDURE — 250N000013 HC RX MED GY IP 250 OP 250 PS 637: Performed by: STUDENT IN AN ORGANIZED HEALTH CARE EDUCATION/TRAINING PROGRAM

## 2023-05-27 PROCEDURE — 83735 ASSAY OF MAGNESIUM: CPT | Performed by: STUDENT IN AN ORGANIZED HEALTH CARE EDUCATION/TRAINING PROGRAM

## 2023-05-27 RX ORDER — DIGOXIN 125 MCG
125 TABLET ORAL DAILY
Status: DISCONTINUED | OUTPATIENT
Start: 2023-05-27 | End: 2023-06-05 | Stop reason: HOSPADM

## 2023-05-27 RX ORDER — FUROSEMIDE 10 MG/ML
40 INJECTION INTRAMUSCULAR; INTRAVENOUS EVERY 12 HOURS
Status: DISCONTINUED | OUTPATIENT
Start: 2023-05-27 | End: 2023-05-27

## 2023-05-27 RX ORDER — FUROSEMIDE 10 MG/ML
40 INJECTION INTRAMUSCULAR; INTRAVENOUS EVERY 12 HOURS
Status: COMPLETED | OUTPATIENT
Start: 2023-05-27 | End: 2023-05-28

## 2023-05-27 RX ADMIN — SUCRALFATE 1 G: 1 TABLET ORAL at 21:41

## 2023-05-27 RX ADMIN — GABAPENTIN 600 MG: 600 TABLET, FILM COATED ORAL at 13:59

## 2023-05-27 RX ADMIN — APIXABAN 5 MG: 5 TABLET, FILM COATED ORAL at 21:41

## 2023-05-27 RX ADMIN — ALBUTEROL SULFATE 2 PUFF: 90 AEROSOL, METERED RESPIRATORY (INHALATION) at 21:47

## 2023-05-27 RX ADMIN — SUCRALFATE 1 G: 1 TABLET ORAL at 08:22

## 2023-05-27 RX ADMIN — ATORVASTATIN CALCIUM 20 MG: 10 TABLET, FILM COATED ORAL at 21:41

## 2023-05-27 RX ADMIN — SUCRALFATE 1 G: 1 TABLET ORAL at 16:23

## 2023-05-27 RX ADMIN — DIGOXIN 125 MCG: 125 TABLET ORAL at 10:32

## 2023-05-27 RX ADMIN — DILTIAZEM HYDROCHLORIDE 30 MG: 30 TABLET, FILM COATED ORAL at 02:50

## 2023-05-27 RX ADMIN — FLUTICASONE FUROATE AND VILANTEROL TRIFENATATE 1 PUFF: 200; 25 POWDER RESPIRATORY (INHALATION) at 08:15

## 2023-05-27 RX ADMIN — DILTIAZEM HYDROCHLORIDE 30 MG: 30 TABLET, FILM COATED ORAL at 10:32

## 2023-05-27 RX ADMIN — SUCRALFATE 1 G: 1 TABLET ORAL at 12:07

## 2023-05-27 RX ADMIN — Medication 400 MG: at 08:22

## 2023-05-27 RX ADMIN — PREDNISONE 40 MG: 20 TABLET ORAL at 08:22

## 2023-05-27 RX ADMIN — FUROSEMIDE 40 MG: 10 INJECTION, SOLUTION INTRAVENOUS at 13:59

## 2023-05-27 RX ADMIN — GABAPENTIN 600 MG: 600 TABLET, FILM COATED ORAL at 21:41

## 2023-05-27 RX ADMIN — DOXYCYCLINE 100 MG: 100 CAPSULE ORAL at 06:46

## 2023-05-27 RX ADMIN — DILTIAZEM HYDROCHLORIDE 30 MG: 30 TABLET, FILM COATED ORAL at 16:23

## 2023-05-27 RX ADMIN — FUROSEMIDE 40 MG: 40 TABLET ORAL at 08:22

## 2023-05-27 RX ADMIN — EMPAGLIFLOZIN 10 MG: 10 TABLET, FILM COATED ORAL at 08:22

## 2023-05-27 RX ADMIN — GABAPENTIN 600 MG: 600 TABLET, FILM COATED ORAL at 08:22

## 2023-05-27 RX ADMIN — APIXABAN 5 MG: 5 TABLET, FILM COATED ORAL at 08:22

## 2023-05-27 RX ADMIN — PANTOPRAZOLE SODIUM 40 MG: 40 TABLET, DELAYED RELEASE ORAL at 08:22

## 2023-05-27 ASSESSMENT — ACTIVITIES OF DAILY LIVING (ADL)
ADLS_ACUITY_SCORE: 35
ADLS_ACUITY_SCORE: 33
ADLS_ACUITY_SCORE: 35
ADLS_ACUITY_SCORE: 35

## 2023-05-27 NOTE — CONSULTS
"[facility] Hematology and Oncology Inpatient Consult Note    Patient: Mary Kay Tejada  MRN: [id number]  Date of Service: 5/27/2023      Reason for Visit    I was consulted by Dr. Mendoza regarding unexplained anemia; prior GI hx but no symptoms. Smear does show \"occasional teardrop forms and rare red cell fragments\" - is it all from aortic stenosis or other proceses, autoimmune?    Assessment/Plan    #.  Severe microcytic anemia  #.  On long-term anticoagulant use  #.  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  Records need to be verified       Upon review of her records, her hemoglobin has been gradually going down for a year.  Her hemoglobin were about 8-9 g/dL over the year and most recently been in around 7 g/dL.  MCV showed microcytic clearly during his last hospitalization.  Microcytosis is new for her.  Clinical suspicions for hemolysis is low.  Although her LDH is elevated, she does not have indirect hyperbilirubinemia. Haptoglobin to follow.  Very scattered red cell fragments is likely no's clinical significance.  Renal function is normal.  She received 1 unit of packed RBC transfusion for hemoglobin of 6.9 which she responded very well. Posttransfusion hemoglobin was improved to 9.3 and her hemoglobin today was 8.9.  She has history of iron deficiency in 2021.  Current hematologic finding is most consistent with iron deficiency anemia.  The etiology of iron deficiency anemia could be from intermittent blood loss given gradual decline and relatively asymptomatic.  Her anemia is less likely from aortic stenosis or hemolysis.   I discussed about the finding and my concern of iron deficiency anemia.  She does not have any obvious bleeding.  I discussed about GI evaluation including endoscopy.  She does not want to do endoscopic evaluation.  I will verify her prior records from gastroenterology.  I discussed that she will need iron supplementation and continue monitoring of hemoglobin and iron studies periodically going " forward given her history of ongoing use of anticoagulant and possible occult GI bleeding.     Plan:  - add on ferrtin and iron studies to the labs drawn before yesterday.  I communicated with the lab.  - Check stool for occult blood.  - Possible ongoing needs of iron supplementation.  -Will follow  ______________________________________________________________________________      Staging History     Cancer Staging   No matching staging information was found for the patient.      History  Ms. Mary Kay Tejada is a very pleasant 73 year old with history of paroxysmal atrial fibrillation on apixaban admitted to the hospital with tachycardia, shortness of breath.  Upon admission, hemoglobin was noted to be at 7.9.  Her hemoglobin dropped to 6.9 yesterday and she received 1 unit of packed RBC transfusion.  Upon further evaluation of her anemia, peripheral blood smear showed occasional teardrops and red cell fragments.  She has history of moderate aortic stenosis.  She does not have artificial heart valves.  She has chronic pain and needle sensation of her legs.  Her activity is fairly limited.  She uses oxygen while asleep.    She has history of GI bleeding in 2018.  It was felt to be from small bowel AVM bleeding.  She was on warfarin at that time.  Last colonoscopy from 6/14/2019 (Dr. Mora) showed 5 colon polyps and several other diminutive polyps, internal hemorrhoids.  Last EGD on 11/6/2018 (Dr. Timmons) was normal.    She lives with her son.  She does not drink alcohol.  No family history of blood disease or hematologic malignancy.    Review of systems.  Apart from describing in history, the remainder of comprehensive ROS was negative.      Past History  Past Medical History:   Diagnosis Date     Anemia      Aortic stenosis      Atrial fibrillation (H)      Atrial flutter (H)      Benign neoplasm of adenomatous polyp     large intestine      Chronic constipation      Chronic pain syndrome      COPD (chronic  obstructive pulmonary disease) (H)     Oxygen at night      Dependence on supplemental oxygen     Oxygen at noc, during the day as needed     Depression      Diabetes mellitus (H)      Dry eye syndrome      Fibromyalgia      Ganglion     left wrist     GERD (gastroesophageal reflux disease)      Hyperlipidemia      Hypertension      Hypokalemia      Infective otitis externa, unspecified     Created by Conversion      Larynx edema      Lung disease      Malignant neoplasm of vulva (H)     Created by Conversion [facility] Annotation: Apr 17 2007  8:24AM - ChataCammy Rowan:  resection per Dr. Alfonso Mane 9/06;  Replacement Utility updated for latest IMO load     Medial epicondylitis      Onychomycosis      Osteoarthritis      Peptic ulcer      Polyneuropathy      Vulvar malignant neoplasm (H)      Past Surgical History:   Procedure Laterality Date     BIOPSY BREAST Right      BIOPSY BREAST Right 01/28/2015     BIOPSY BREAST Right 1/28/2015    Procedure: RIGHT BREAST BIOPSY AFTER WIRE LOCALIZATION AT 0940;  Surgeon: Renée Soriano MD;  Location: [facility];  Service:      BIOPSY OF BREAST, INCISIONAL      Description: Incisional Breast Biopsy;  Recorded: 11/13/2007;  Comments: benign     COLONOSCOPY N/A 6/14/2019    Procedure: COLONOSCOPY;  Surgeon: Eduardo Mora MD;  Location: [facility];  Service: Gastroenterology     ESOPHAGOSCOPY, GASTROSCOPY, DUODENOSCOPY (EGD), COMBINED N/A 11/6/2018    Procedure: ESOPHAGOGASTRODUODENOSCOPY;  Surgeon: Lit Fernando MD;  Location: [facility];  Service:      HYSTERECTOMY       JOINT REPLACEMENT Left     TKA     PICC TRIPLE LUMEN PLACEMENT  1/12/2023          MS ABLATE HEART DYSRHYTHM FOCUS      Description: Catheter Ablation Atrial Fibrillation;  Recorded: 07/31/2012;  Comments: 7/24/12 PVI with Dr. Gardiner and nilay to all 5 pulm veins and CTI fl ablation line as well.     ZZC SUPRACERV ABD HYSTERECTOMY      Description: Supracervical  "Hysterectomy;  Proc Date: 01/01/1985;  Comments: some cervix left!; ovaries intact; done for bleeding     Family History   Problem Relation Age of Onset     Heart Failure Mother      Cancer Other         paternal HX-laryngeal      Alcoholism Sister      No Known Problems Daughter      No Known Problems Maternal Grandmother      No Known Problems Maternal Grandfather      No Known Problems Paternal Grandmother      No Known Problems Paternal Grandfather      No Known Problems Maternal Aunt      No Known Problems Paternal Aunt      Alcoholism Sister      Alcoholism Brother      Alcoholism Father      Cancer Paternal Uncle         Gastric-Alcohol     Cancer Paternal Uncle         gastric-Alcohol     Hereditary Breast and Ovarian Cancer Syndrome No family hx of      Breast Cancer No family hx of      Colon Cancer No family hx of      Endometrial Cancer No family hx of      Ovarian Cancer No family hx of      Social History     Socioeconomic History     Marital status:      Spouse name: None     Number of children: None     Years of education: None     Highest education level: None   Tobacco Use     Smokeless tobacco: Never     Tobacco comments:     seen by TTS inpatient on 3/31/22   Substance and Sexual Activity     Alcohol use: Yes     Comment: Alcoholic Drinks/day: very little     Drug use: No       Allergies    Allergies   Allergen Reactions     Celebrex [Celecoxib] Rash     patient had butterfly rash - \"lupus-like\"       Latex Rash          Physical Exam    /52 (BP Location: Left arm)   Pulse 117   Temp 98.5  F (36.9  C) (Oral)   Resp 17   Ht 1.676 m (5' 6\")   Wt 63.8 kg (140 lb 9.6 oz)   SpO2 99%   BMI 22.69 kg/m        General: alert, awake, not in acute distress  HEENT: Head: Normal, normocephalic, atraumatic.  Eye: Normal external eye, conjunctiva, lids cornea, TIM.  Nose: Normal external nose, mucus membranes and septum.  Pharynx: Normal buccal mucosa. Normal pharynx.  Neck / Thyroid: " Supple, no masses, nodes, nodules or enlargement.  Lymphatics: No abnormally enlarged lymph nodes.  Chest: Normal chest wall and respirations. Clear to auscultation.  Heart: S1 S2 RRR, no murmur.   Abdomen: abdomen is soft without significant tenderness, masses, organomegaly or guarding  Extremities: normal strength, tone, and muscle mass  Skin: normal. no rash or abnormalities  CNS: non focal.      Lab Results  Recent Results (from the past 24 hour(s))   Glucose by meter    Collection Time: 23  5:13 PM   Result Value Ref Range    GLUCOSE BY METER POCT 232 (H) 70 - 99 mg/dL   Glucose by meter    Collection Time: 23  9:03 PM   Result Value Ref Range    GLUCOSE BY METER POCT 216 (H) 70 - 99 mg/dL   Magnesium    Collection Time: 23  5:43 AM   Result Value Ref Range    Magnesium 1.7 (L) 1.8 - 2.6 mg/dL   Hemoglobin    Collection Time: 23  5:43 AM   Result Value Ref Range    Hemoglobin 8.9 (L) 11.7 - 15.7 g/dL   Glucose by meter    Collection Time: 23  7:48 AM   Result Value Ref Range    GLUCOSE BY METER POCT 152 (H) 70 - 99 mg/dL   Glucose by meter    Collection Time: 23 11:39 AM   Result Value Ref Range    GLUCOSE BY METER POCT 187 (H) 70 - 99 mg/dL   Glucose by meter    Collection Time: 23  4:07 PM   Result Value Ref Range    GLUCOSE BY METER POCT 227 (H) 70 - 99 mg/dL        Imaging Results    Echocardiogram Complete    Result Date: 2023  [id number]^[id number] [address]  Name: ALVAREZ HINDS MRN: [id number] : 1950 Study Date: 2023 01:44 PM Age: 73 yrs Gender: Female Patient Location: [facility] Reason For Study: Aflutter Ordering Physician: SYDNIE COLLADO Performed By: LING  BSA: 1.7 m2 Height: 65.5 in Weight: 143 lb HR: 82 BP: 114/62 mmHg ______________________________________________________________________________ Procedure Compared to the prior study dated 23, there are changes  as noted. ______________________________________________________________________________ Interpretation Summary  Compared to the prior study dated 1/11/23, there are changes as noted. Aortic stenosis is now severe. The left ventricle is normal in size. Left ventricular function is normal.The ejection fraction is 60-65%. The right ventricle is mildly dilated. The left atrium is mildly dilated. Severe valvular aortic stenosis. There is mild to moderate (1-2+) aortic regurgitation. Small pericardial effusion  ______________________________________________________________________________ Left Ventricle The left ventricle is normal in size. Left ventricular function is normal.The ejection fraction is 60-65%. There is normal left ventricular wall thickness. Left ventricular diastolic function is abnormal. Diastolic Doppler findings (E/E' ratio and/or other parameters) suggest left ventricular filling pressures are increased. No regional wall motion abnormalities noted.  Right Ventricle The right ventricle is mildly dilated. The right ventricular systolic function is normal.  Atria The left atrium is mildly dilated. Right atrial size is normal. There is no color Doppler evidence of an atrial shunt.  Mitral Valve There is mild mitral annular calcification. Mitral valve leaflets appear normal. There is mild (1+) mitral regurgitation.  Tricuspid Valve The tricuspid valve is not well visualized, but is grossly normal. No tricuspid regurgitation.  Aortic Valve There is mild to moderate (1-2+) aortic regurgitation. Severe valvular aortic stenosis. Mean gradient of 47 mmHg, Peak velocity of 4.3 m/s, AV cuca DI 0.25.  Pulmonic Valve The pulmonic valve is not well visualized. This degree of valvular regurgitation is within normal limits.  Vessels The aorta root is normal. Normal size ascending aorta. IVC diameter <2.1 cm collapsing >50% with sniff suggests a normal RA pressure of 3 mmHg.  Pericardium Small pericardial effusion.   ______________________________________________________________________________ MMode/2D Measurements & Calculations RVDd: 4.0 cm IVSd: 0.67 cm LVIDd: 5.3 cm LVIDs: 3.5 cm LVPWd: 1.1 cm FS: 32.9 %  LV mass(C)d: 170.1 grams LV mass(C)dI: 98.2 grams/m2 Ao root diam: 2.6 cm LA dimension: 4.9 cm asc Aorta Diam: 2.7 cm LA/Ao: 1.9 LVOT diam: 2.1 cm LVOT area: 3.3 cm2 LA Volume (BP): 78.5 ml LA Volume Index (BP): 45.4 ml/m2  LA Volume Indexed (AL/bp): 52.5 ml/m2 RWT: 0.42  Doppler Measurements & Calculations MV E max raji: 181.0 cm/sec MV A max raji: 54.0 cm/sec MV E/A: 3.4 MV max P.8 mmHg MV mean P.5 mmHg MV V2 VTI: 50.3 cm MVA(VTI): 1.6 cm2 MV P1/2t max raji: 236.2 cm/sec MV P1/2t: 54.1 msec MVA(P1/2t): 4.1 cm2 MV dec slope: 1279 cm/sec2 MV dec time: 0.13 sec Ao V2 max: 431.0 cm/sec Ao max P.0 mmHg Ao V2 mean: 325.6 cm/sec Ao mean P.8 mmHg Ao V2 VTI: 92.5 cm ADALBERTO(I,D): 0.89 cm2 ADALBERTO(V,D): 0.82 cm2 LV V1 max P.5 mmHg LV V1 max: 106.4 cm/sec LV V1 VTI: 24.8 cm SV(LVOT): 81.9 ml SI(LVOT): 47.3 ml/m2 PA V2 max: 124.9 cm/sec PA max P.2 mmHg PA mean PG: 3.5 mmHg PA V2 VTI: 25.1 cm AV Raji Ratio (DI): 0.25 ADALBERTO Index (cm2/m2): 0.51  E/E': 26.0 E/E' av.4 Lateral E/e': 24.8 Medial E/e': 26.0 Peak E' Raji: 7.0 cm/sec  ______________________________________________________________________________ Report approved by: Diamante Rubio 2023 03:53 PM       Chest XR,  PA & LAT    Result Date: 2023  EXAM: XR CHEST 2 VIEWS LOCATION: [facility] DATE/TIME: 2023 1:04 PM CDT INDICATION: Shortness of breath COMPARISON: Portable AP view of the chest 2023     IMPRESSION: There is a small to moderate right pleural effusion. Adjacent opacity in the basal right chest consistent with multisegment lower/middle lobe atelectasis, similar to 2023. There is minimal pleural fluid in the left posterior costophrenic sulcus. No new, superimposed interstitial or alveolar  lung opacities. The lower right heart border remains silhouetted. No change in mildly enlarged cardiac silhouette. Vascular pedicle width is normal. Degenerative osteophytes of the thoracic spine and right glenohumeral osteoarthrosis is again noted.    US Lower Extremity Venous Duplex Bilateral    Result Date: 5/24/2023  EXAM: US LOWER EXTREMITY VENOUS DUPLEX BILATERAL LOCATION: [facility] DATE/TIME: 5/24/2023 12:10 PM CDT INDICATION: Bilateral lower extremity pain, swelling and edema. COMPARISON: None. TECHNIQUE: Venous Duplex ultrasound of bilateral lower extremities with and without compression, augmentation and duplex. Color flow and spectral Doppler with waveform analysis performed. FINDINGS: Exam includes the common femoral, femoral, popliteal veins as well as segmentally visualized deep calf veins and greater saphenous vein. RIGHT: No deep vein thrombosis. No superficial thrombophlebitis. Mildly complex predominantly anechoic avascular cyst in the right popliteal fossa measuring approximately 6.2 x 4.3 x 1.9 cm. LEFT: No deep vein thrombosis. No superficial thrombophlebitis. No popliteal cyst.     IMPRESSION: 1.  No deep venous thrombosis in the bilateral lower extremities. 2.  Mildly complex right popliteal fossa Baker's cyst.       Signed by: Chandra Major MD

## 2023-05-27 NOTE — PROGRESS NOTES
CARDIOLOGY PROGRESS NOTE      Assessment/Plan:  1.  Atrial flutter (H)-atypical, asymptomatic, rate control strategy currently.  Add some digoxin.  Given the stability at this point not pursuing urgent cardioversion and can discuss attempt as an outpatient.  2.  Aortic stenosis-severe with peak velocity of 4.3 m/s, mean gradient of 47 mmHg.  We will need to have angiography to evaluate for TAVR.  Had CTA yesterday.  3.  Paroxysmal Atrial Fibrillation-status post pulmonary vein isolation ablation in .  Anticoagulation with Eliquis.  4.  COPD exacerbation (H)-suspect this is her biggest issue, treatment per primary care team.  Needs to discontinue tobacco use.  5.  Heart failure- suspect mild in the setting of preserved ejection fraction of 60 to 65%.  Oxygen saturation 95% on 3 L, creatinine preserved.  We will give some IV diuretic twice today.  6.  Type 2 diabetes mellitus with hypoglycemia (H)-so noted and and hemoglobin A1c 6.1 and started yesterday Jardiance.  7.  Pleural effusion- given anemia hold off on thoracentesis.  Did have thoracentesis in January.  8.  Anemia- hemoglobin significantly down to 6.9.  Received blood yesterday.  Hemoglobin went up to 9.3 but now back down to 8.9, defer work-up to primary care team.    9.  Chronic pain syndrome-so noted and defer to primary care team, felt secondary to Fibromyalgia.    Plan:  1.  We will add some digoxin.  2.  IV diuresis twice today.    Discharge Plannin.  Cardiac barrier to discharge would be resolution of excess of fluid and rate control.  Anticipate tomorrow.  2.  Can follow with Dr. Bao Pizano primary cardiologist.  3.  Will need to be seen by TAVR clinic.     LOS: 3 days     Subjective:  Interval History:    72-year-old white female being seen on fourth day of hospitalization.  PCAs present, her son's girlfriend.  Admits to some mild shortness of breath, some mild bipedal edema.  Denies any chest pains, palpitations, significant  "shortness of breath, PND, orthopnea, syncope, dizziness.    Medications    apixaban ANTICOAGULANT  5 mg Oral BID     atorvastatin  20 mg Oral At Bedtime     diltiazem  30 mg Oral Q8H     empagliflozin  10 mg Oral Daily     fluticasone-vilanterol  1 puff Inhalation Daily     furosemide  40 mg Oral BID     gabapentin  600 mg Oral TID     insulin aspart  1-7 Units Subcutaneous TID AC     insulin aspart  1-5 Units Subcutaneous At Bedtime     magnesium oxide  400 mg Oral Daily     nicotine   Transdermal Q8H     pantoprazole  40 mg Oral Daily     predniSONE  40 mg Oral Daily     sodium chloride (PF)  3 mL Intracatheter Q8H     sucralfate  1 g Oral 4x Daily AC & HS     Objective:   Vital signs in last 24 hours:  Temp:  [97.5  F (36.4  C)-98.7  F (37.1  C)] 98.6  F (37  C)  Pulse:  [] 94  Resp:  [15-18] 15  BP: (110-128)/(55-76) 128/60  SpO2:  [89 %-100 %] 89 %    Physical Exam:  /60 (BP Location: Right arm)   Pulse 94   Temp 98.6  F (37  C) (Oral)   Resp 15   Ht 1.676 m (5' 6\")   Wt 63.8 kg (140 lb 9.6 oz)   SpO2 (!) 89%   BMI 22.69 kg/m      General Appearance:    Alert, cooperative, no distress, appears stated age   Head:    Normocephalic, without obvious abnormality, atraumatic   Throat:   Lips, mucosa, and tongue normal; teeth and gums normal   Neck:   Supple, symmetrical, trachea midline, no adenopathy;        thyroid:  No enlargement/tenderness/nodules; no carotid    bruit or JVD   Back:     Symmetric, no curvature, ROM normal, no CVA tenderness   Lungs:     Clear to auscultation bilaterally, respirations unlabored   Chest wall:    No tenderness or deformity   Heart:    Irregularly irregular, S1 and S2 normal, 2/6 systolic ejection murmur, mid peaking, no rub   or gallop   Abdomen:     Soft, non-tender, bowel sounds active all four quadrants,     no masses, no organomegaly   Extremities:   Normal, atraumatic, no cyanosis, 1/4 bipedal edema   Pulses:   2+ and symmetric all extremities   Skin:   Skin " color, texture, turgor normal, no rashes or lesions     Cardiographics:      ECG: Personally reviewed by myself shows atypical atrial flutter, leftward axis, nonspecific ST-T wave changes.    Echocardiogram:   Compared to the prior study dated 1/11/23, there are changes as noted. Aortic stenosis is now severe.  The left ventricle is normal in size. Left ventricular function is normal.The ejection fraction is 60-65%.  The right ventricle is mildly dilated.  The left atrium is mildly dilated.  Severe valvular aortic stenosis. There is mild to moderate (1-2+) aortic regurgitation.  Small pericardial effusion      Lab Results:   Recent Labs   Lab 05/27/23  0543 05/26/23  1601 05/26/23  1152   WBC  --   --  10.8   HGB 8.9*   < > 8.7*   HCT  --   --  30.2*   PLT  --   --  341    < > = values in this interval not displayed.     Recent Labs   Lab 05/25/23  0507      CO2 29   BUN 31*   .       Lab Results   Component Value Date    TROPONINI 0.07 05/24/2023

## 2023-05-27 NOTE — PROGRESS NOTES
[facility]    Medicine Progress Note - Hospitalist Service    Date of Admission:  5/24/2023    Assessment & Plan       SUMMARY: Mary Kay Tejada is a 73 year old female admitted on 5/24/2023. She has a past medical history of paroxysmal atrial flutter on apixaban, COPD, hyperlipidemia, diabetes mellitus type 2 with neuropathy, reflux, dizziness, tobacco use, fibromyalgia and chronic pain syndrome, who presents with shortness of breath and elevated heart rate in the 170s range.    On admission was tachycardic 170, otherwise stable vitals.  BMP nonremarkable, magnesium low 1.5, , CXR shows small to moderate right pleural effusion. Adjacent opacity in the basal right chest consistent with multisegment lower/middle lobe atelectasis, similar to February 2023. There is minimal pleural fluid in the left posterior costophrenic sulcus; lactate 1.3, procalcitonin negative, negative troponin, negative for DVT on US; EKG shows atrial flutter. Patient was given extra diltiazem; admitted and cardiology consulted. Given steroid burst (only 3 days initially) and doxycycline for anti-inflammatory (noted long QTc so avoiding azithromycin). Also hgb noted at 7.9 but no symptoms though prior in February was 9.2. On telemetry.  Following the hemoglobin for anemia serially. Admission diagnoses: atrial flutter, copd exacerbation, and anemia.    Aflutter, aortic stensosis, volume status.  Remains rate controlled and transitioned to po digoxin 5/25. Cardiology involved TAVR team in anticipation of future intervention. Jardiance added for both DM2 and heart health. Iv diuresis started 5/27 (upon discharge likely back to usual po regimen).     COPD exacerbation-  Increased steroid burst to 40mg po every day 5/25; weaning 02 back to baseline is goal; from 7L 5/25 to now  4L on 5/26/2023. Receiving azithromycin as well. Duonebs. Closing towards baseline at 3L NC but will need to ambulation trial prior to  "discharge in case may require exertional supplemental 02.     Acute anemia-  serially following hgb w/ downward trend and reports of darker colored but non-melanotic stools; subsequently with a critical low hgb at 6.9 on 5/26/2023 early AM, receiving transfusion. Hemolysis labs ordered 5/26. On 5/27/2023 consulting hematology for the unexplained anemia (prior GI hx but no symptoms) and peripheral smear demonstrating \"occasional teardrop forms and rare red cell fragments.\"     - - - - -   Atrial flutter   Shortness of breath  Long QTc  Assessment: improving on digoxin; transitioned to po per cards 5/25. stable  Plan:   - consult to cardiology, appreciate   - digoxin per cardiology  - telemetry  - monitor clinically  - continue apixaban   - avoid QTc-prolonging medications (ie ordering doxycycline instead of azithromycin)     COPD exacerbation  Acute respiratory failure with hypoxia  Assessment: required 5L on admit; her baseline is 2.5 L PTA.  Suspect the exacerbation could be a trigger exacerbating the above primary issue, aflutter.  Discussed with the patient and her son about steroids and anti-inflammatories.  Given the long QTc we will start with doxycycline for anti-inflammatory antibiotic. Home regimen of ProAir and DuoNeb and Breo. Does not appears overtly volume up and CXR effusions are stable in comparison to prior. Her 02 needs are labile, from 4-7L on only 5/25-- increased steroids to 40mg and appears to be helping-- now at 4L NC on 5/26/2023   Plan:   - started initial steroids: low dose 20mg daily   - on 5/25/2023 increased to 40mg po daily -> continue  - continue doxycycline 100mg po bid, x3 days in total (completing 5/26)  - continue home medications  - duonebs, adjust as per RT  - 02 and wean as able back to baseline of 2.5L NC  - continue PTA lasix   - additional dose iv after transfusion 5/26/2023     Hx of heart failure with preserved ejection fraction  A- small-moderate right sided effusion and " "minimal of left, otherwise does not appear overtly volume up; had thoracentesis in January. BNP not overtly elevated on admit at 176 vs previously 867 in February. No rales or extremity edema. Cardiology giving iv diuresis for 5/27/2023.   P:  - continue home lasix  - I/Os, weights  - BNP was 176 vs 867 in February  - given anemia, concern for bleed, hold off on a thoracentesis at this time    Anemia, microcytic   Hx of gastric ulcer  Assessment: The delta was concerning with hemoglobin at 7.9 though earlier was 9.2 in February.  However, no signs or symptoms concerning for bleed, no melena or hematochezia or hematemesis.  She does have a prior history of prior ulcerations though. with a critical low hgb at 6.9 on 5/26/2023 early AM, received transfusion. Also peripheral smear and hemolysis labs ordered. Would enlist GI to help if develops symptoms concerning for a GI process. Smear does show \"occasional teardrop forms and rare red cell fragments\" which could be consistent with aortic stenosis but question threshold to investigate other proceses, autoimmune?   Plan:  - Consult to hematology, appreciate forthecoming input  - lactate dehydrogenase, LFTs, haptoglobin , as well as a reticulocyte count)  -Serially follow hemoglobin - recheck then q12 hrs  - see below regarding continuing reflux meds   - transfuse if hgb < 7    - consent obtained for pRBC transfusions on 5/25    Reflux  A/P-see issue above as well, continue PPI Carafate     Hyperlipidemia  A/P-continue statin    Diabetes mellitus type 2 with neuropathy  Assessment: On metformin, has neuropathy. Sugars above goal, increased scale 5/25; now stable.  Plan:  - add on Hgb A1c resulted at 6.1 on 5/25  -Hypoglycemia protocol  -Hold metformin while admitted to the hospital  -Sliding scale insulin, increased to Medium dose   -Continue gabapentin     Hypomagnesemia  A/p- replacement protocol; continue home magnesium tomorrow    Hypertension  A- pressures are soft. " On diltizem for both htn and aflutter. On diuretic, continued.   P:  - diltiazem as per cardiology - stopped 5/26/2023   - continue PTA lasix     Dizziness  A/p- stable but can continue PRN meclizine     Tobacco use  A/p- encouraged decreasing and eventual cessation; continue nicotine inhaler     fibromyalgia   chronic pain syndrome  Assessment: Currently pain stable.  She is on a regimen of oxycodone.   Plan:  -Continue for now but modified with a lower range available: 5-10mg q6 hrs prn (instead of scheduled 10)        Diet: Combination Diet Low Saturated Fat Na <2400mg Diet, No Caffeine Diet    DVT Prophylaxis: Pneumatic Compression Devices, Ambulate every shift and no pharmaceutical given worsening anemia   Chairez Catheter: Not present  Lines: None     Cardiac Monitoring: ACTIVE order. Indication: Acute decompensated heart failure (48 hours)  Code Status: Full Code      Clinically Significant Risk Factors             # Hypomagnesemia: Lowest Mg = 1.6 mg/dL in last 2 days, will replace as needed   # Hypoalbuminemia: Lowest albumin = 3 g/dL at 5/26/2023  1:07 PM, will monitor as appropriate     # Hypertension: Noted on problem list         # Moderate Malnutrition: based on nutrition assessment, PRESENT ON ADMISSION        Disposition Plan      Expected Discharge Date: 05/28/2023      Destination: home with family;home with help/services  Discharge Comments: if hgb stable, likely home          Rayo Mendoza MD  Hospitalist Service  [facility]  Securely message with [employer] (more info)  Text page via [employer] Paging/Directory   ______________________________________________________________________    Interval History   - no acute events  - pt is on 3L this AM, discussed w/ her goal to wean back to baseline, and also likely tomorrow or later today do a walking ambulation trial  -- she has no new symptoms, no abdominal pain overtly  - we discussed her anemia and peripheral smear results and  asking Hematology to assess  - she has no new symptoms or pain   - she passed a BROWN BM confirmed w/ bedside nursing     Physical Exam   Vital Signs: Temp: 98.6  F (37  C) Temp src: Oral BP: 120/58 Pulse: (!) 142   Resp: 16 SpO2: 91 % O2 Device: Nasal cannula Oxygen Delivery: 3 LPM  Weight: 140 lbs 9.6 oz    General: alert, oriented, and in no acute distress  Pulmonary: coarse, some decreased breath sounds, but no rales or wheezes; on 3L in AM  CVS: irregularly irregular, no murmurs, rubs, or gallops; no blatant jugular venous distention; mild lower extremity edema most prominent in feet and extremities are warm to the touch  GI: soft, nontender, BS+, no rebound or guarding, no conspicuous organomegaly   Neuro: nonfocal, moves all extremities of own volition  Psych: appropriate         Medical Decision Making       55 MINUTES SPENT BY ME on the date of service doing chart review, history, exam, documentation & further activities per the note.      Data   ------------------------- PAST 24 HR DATA REVIEWED -----------------------------------------------    I have personally reviewed the following data over the past 24 hrs:    10.8  \   8.9 (L)   / 341     N/A N/A N/A /  152 (H)   N/A N/A N/A \       ALT: 24 AST: 21 AP: 112 TBILI: 0.9   ALB: 3.0 (L) TOT PROTEIN: 6.6 LIPASE: N/A       Ferritin:  N/A % Retic:  2.2 LDH:  307 (H)       Imaging results reviewed over the past 24 hrs:   No results found for this or any previous visit (from the past 24 hour(s)).

## 2023-05-27 NOTE — PLAN OF CARE
Goal Outcome Evaluation:    Problem: Plan of Care - These are the overarching goals to be used throughout the patient stay.    Goal: Optimal Comfort and Wellbeing  Outcome: Progressing     Problem: Gas Exchange Impaired  Goal: Optimal Gas Exchange  Outcome: Progressing     Problem: Malnutrition  Goal: Improved Nutritional Intake  Outcome: Progressing  Patient is up with a standby assist. Tolerating activity well at this time. Reports dyspnea with exertion. Still requiring 3L of oxygen via nasal cannula. Hem/onc consult today. Patient receiving x2 doses of IV lasix per cardiology. Call light placed within reach and purposeful rounding performed. Bridgett Covington RN

## 2023-05-27 NOTE — PLAN OF CARE
"Problem: Dysrhythmia  Goal: Normalized Cardiac Rhythm     Problem: Gas Exchange Impaired  Goal: Optimal Gas Exchange    Goal Outcome Evaluation:  VSS, afebrile. Tele aflutter HR 80-90's. Denies pain. Breathing feels \"good.\" Remains on 3L via NC overnight.   "

## 2023-05-28 LAB
ANION GAP SERPL CALCULATED.3IONS-SCNC: 7 MMOL/L (ref 5–18)
BUN SERPL-MCNC: 25 MG/DL (ref 8–28)
CALCIUM SERPL-MCNC: 9 MG/DL (ref 8.5–10.5)
CHLORIDE BLD-SCNC: 98 MMOL/L (ref 98–107)
CO2 SERPL-SCNC: 40 MMOL/L (ref 22–31)
CREAT SERPL-MCNC: 0.63 MG/DL (ref 0.6–1.1)
ERYTHROCYTE [DISTWIDTH] IN BLOOD BY AUTOMATED COUNT: 22.9 % (ref 10–15)
GFR SERPL CREATININE-BSD FRML MDRD: >90 ML/MIN/1.73M2
GLUCOSE BLD-MCNC: 99 MG/DL (ref 70–125)
GLUCOSE BLDC GLUCOMTR-MCNC: 139 MG/DL (ref 70–99)
GLUCOSE BLDC GLUCOMTR-MCNC: 142 MG/DL (ref 70–99)
GLUCOSE BLDC GLUCOMTR-MCNC: 175 MG/DL (ref 70–99)
GLUCOSE BLDC GLUCOMTR-MCNC: 366 MG/DL (ref 70–99)
HCT VFR BLD AUTO: 31.5 % (ref 35–47)
HEMOCCULT STL QL: POSITIVE
HGB BLD-MCNC: 9 G/DL (ref 11.7–15.7)
MAGNESIUM SERPL-MCNC: 1.7 MG/DL (ref 1.8–2.6)
MCH RBC QN AUTO: 21.6 PG (ref 26.5–33)
MCHC RBC AUTO-ENTMCNC: 28.6 G/DL (ref 31.5–36.5)
MCV RBC AUTO: 76 FL (ref 78–100)
PLATELET # BLD AUTO: 283 10E3/UL (ref 150–450)
POTASSIUM BLD-SCNC: 3.2 MMOL/L (ref 3.5–5)
POTASSIUM BLD-SCNC: 4.5 MMOL/L (ref 3.5–5)
RBC # BLD AUTO: 4.16 10E6/UL (ref 3.8–5.2)
SODIUM SERPL-SCNC: 145 MMOL/L (ref 136–145)
WBC # BLD AUTO: 7.5 10E3/UL (ref 4–11)

## 2023-05-28 PROCEDURE — 99232 SBSQ HOSP IP/OBS MODERATE 35: CPT | Performed by: INTERNAL MEDICINE

## 2023-05-28 PROCEDURE — 83735 ASSAY OF MAGNESIUM: CPT | Performed by: STUDENT IN AN ORGANIZED HEALTH CARE EDUCATION/TRAINING PROGRAM

## 2023-05-28 PROCEDURE — 210N000002 HC R&B HEART CARE

## 2023-05-28 PROCEDURE — 84132 ASSAY OF SERUM POTASSIUM: CPT | Performed by: STUDENT IN AN ORGANIZED HEALTH CARE EDUCATION/TRAINING PROGRAM

## 2023-05-28 PROCEDURE — 250N000009 HC RX 250: Performed by: INTERNAL MEDICINE

## 2023-05-28 PROCEDURE — 258N000003 HC RX IP 258 OP 636: Performed by: INTERNAL MEDICINE

## 2023-05-28 PROCEDURE — 99232 SBSQ HOSP IP/OBS MODERATE 35: CPT | Performed by: STUDENT IN AN ORGANIZED HEALTH CARE EDUCATION/TRAINING PROGRAM

## 2023-05-28 PROCEDURE — 82272 OCCULT BLD FECES 1-3 TESTS: CPT | Performed by: INTERNAL MEDICINE

## 2023-05-28 PROCEDURE — 85027 COMPLETE CBC AUTOMATED: CPT | Performed by: STUDENT IN AN ORGANIZED HEALTH CARE EDUCATION/TRAINING PROGRAM

## 2023-05-28 PROCEDURE — 250N000011 HC RX IP 250 OP 636: Performed by: INTERNAL MEDICINE

## 2023-05-28 PROCEDURE — 36415 COLL VENOUS BLD VENIPUNCTURE: CPT | Performed by: STUDENT IN AN ORGANIZED HEALTH CARE EDUCATION/TRAINING PROGRAM

## 2023-05-28 PROCEDURE — 82310 ASSAY OF CALCIUM: CPT | Performed by: STUDENT IN AN ORGANIZED HEALTH CARE EDUCATION/TRAINING PROGRAM

## 2023-05-28 PROCEDURE — 99233 SBSQ HOSP IP/OBS HIGH 50: CPT | Performed by: INTERNAL MEDICINE

## 2023-05-28 PROCEDURE — 250N000012 HC RX MED GY IP 250 OP 636 PS 637: Performed by: STUDENT IN AN ORGANIZED HEALTH CARE EDUCATION/TRAINING PROGRAM

## 2023-05-28 PROCEDURE — 250N000013 HC RX MED GY IP 250 OP 250 PS 637: Performed by: STUDENT IN AN ORGANIZED HEALTH CARE EDUCATION/TRAINING PROGRAM

## 2023-05-28 PROCEDURE — 250N000013 HC RX MED GY IP 250 OP 250 PS 637: Performed by: INTERNAL MEDICINE

## 2023-05-28 RX ORDER — DILTIAZEM HCL/D5W 125 MG/125
5 PLASTIC BAG, INJECTION (ML) INTRAVENOUS CONTINUOUS
Status: DISCONTINUED | OUTPATIENT
Start: 2023-05-28 | End: 2023-05-31

## 2023-05-28 RX ORDER — FUROSEMIDE 10 MG/ML
40 INJECTION INTRAMUSCULAR; INTRAVENOUS EVERY 12 HOURS
Status: COMPLETED | OUTPATIENT
Start: 2023-05-28 | End: 2023-05-28

## 2023-05-28 RX ORDER — POTASSIUM CHLORIDE 1500 MG/1
40 TABLET, EXTENDED RELEASE ORAL ONCE
Status: COMPLETED | OUTPATIENT
Start: 2023-05-28 | End: 2023-05-28

## 2023-05-28 RX ORDER — METHYLPREDNISOLONE SODIUM SUCCINATE 125 MG/2ML
125 INJECTION, POWDER, LYOPHILIZED, FOR SOLUTION INTRAMUSCULAR; INTRAVENOUS
Status: DISCONTINUED | OUTPATIENT
Start: 2023-05-28 | End: 2023-06-05 | Stop reason: HOSPADM

## 2023-05-28 RX ORDER — DIPHENHYDRAMINE HYDROCHLORIDE 50 MG/ML
50 INJECTION INTRAMUSCULAR; INTRAVENOUS
Status: DISCONTINUED | OUTPATIENT
Start: 2023-05-28 | End: 2023-06-05 | Stop reason: HOSPADM

## 2023-05-28 RX ORDER — METOPROLOL TARTRATE 1 MG/ML
2.5 INJECTION, SOLUTION INTRAVENOUS EVERY 8 HOURS
Status: DISCONTINUED | OUTPATIENT
Start: 2023-05-28 | End: 2023-05-29

## 2023-05-28 RX ORDER — DILTIAZEM HYDROCHLORIDE 5 MG/ML
5 INJECTION INTRAVENOUS ONCE
Status: COMPLETED | OUTPATIENT
Start: 2023-05-28 | End: 2023-05-28

## 2023-05-28 RX ADMIN — PREDNISONE 40 MG: 20 TABLET ORAL at 08:16

## 2023-05-28 RX ADMIN — Medication 400 MG: at 08:15

## 2023-05-28 RX ADMIN — GABAPENTIN 600 MG: 600 TABLET, FILM COATED ORAL at 21:04

## 2023-05-28 RX ADMIN — SUCRALFATE 1 G: 1 TABLET ORAL at 15:31

## 2023-05-28 RX ADMIN — METOPROLOL TARTRATE 2.5 MG: 5 INJECTION INTRAVENOUS at 15:31

## 2023-05-28 RX ADMIN — IRON SUCROSE 300 MG: 20 INJECTION, SOLUTION INTRAVENOUS at 13:50

## 2023-05-28 RX ADMIN — GABAPENTIN 600 MG: 600 TABLET, FILM COATED ORAL at 08:15

## 2023-05-28 RX ADMIN — APIXABAN 5 MG: 5 TABLET, FILM COATED ORAL at 21:04

## 2023-05-28 RX ADMIN — SUCRALFATE 1 G: 1 TABLET ORAL at 05:18

## 2023-05-28 RX ADMIN — DILTIAZEM HYDROCHLORIDE 30 MG: 30 TABLET, FILM COATED ORAL at 08:23

## 2023-05-28 RX ADMIN — Medication 5 MG/HR: at 14:27

## 2023-05-28 RX ADMIN — FLUTICASONE FUROATE AND VILANTEROL TRIFENATATE 1 PUFF: 200; 25 POWDER RESPIRATORY (INHALATION) at 08:17

## 2023-05-28 RX ADMIN — APIXABAN 5 MG: 5 TABLET, FILM COATED ORAL at 08:15

## 2023-05-28 RX ADMIN — SUCRALFATE 1 G: 1 TABLET ORAL at 21:04

## 2023-05-28 RX ADMIN — PANTOPRAZOLE SODIUM 40 MG: 40 TABLET, DELAYED RELEASE ORAL at 08:15

## 2023-05-28 RX ADMIN — FUROSEMIDE 40 MG: 10 INJECTION, SOLUTION INTRAVENOUS at 01:09

## 2023-05-28 RX ADMIN — DILTIAZEM HYDROCHLORIDE 5 MG: 5 INJECTION, SOLUTION INTRAVENOUS at 13:49

## 2023-05-28 RX ADMIN — FUROSEMIDE 40 MG: 10 INJECTION, SOLUTION INTRAVENOUS at 12:22

## 2023-05-28 RX ADMIN — DILTIAZEM HYDROCHLORIDE 30 MG: 30 TABLET, FILM COATED ORAL at 01:09

## 2023-05-28 RX ADMIN — FUROSEMIDE 40 MG: 10 INJECTION, SOLUTION INTRAVENOUS at 23:33

## 2023-05-28 RX ADMIN — ATORVASTATIN CALCIUM 20 MG: 10 TABLET, FILM COATED ORAL at 21:04

## 2023-05-28 RX ADMIN — GABAPENTIN 600 MG: 600 TABLET, FILM COATED ORAL at 13:49

## 2023-05-28 RX ADMIN — SUCRALFATE 1 G: 1 TABLET ORAL at 12:22

## 2023-05-28 RX ADMIN — POTASSIUM CHLORIDE 40 MEQ: 1500 TABLET, EXTENDED RELEASE ORAL at 08:15

## 2023-05-28 RX ADMIN — EMPAGLIFLOZIN 10 MG: 10 TABLET, FILM COATED ORAL at 08:15

## 2023-05-28 RX ADMIN — DIGOXIN 125 MCG: 125 TABLET ORAL at 08:15

## 2023-05-28 ASSESSMENT — ACTIVITIES OF DAILY LIVING (ADL)
ADLS_ACUITY_SCORE: 35
ADLS_ACUITY_SCORE: 35
ADLS_ACUITY_SCORE: 37
ADLS_ACUITY_SCORE: 35
ADLS_ACUITY_SCORE: 37
ADLS_ACUITY_SCORE: 35
ADLS_ACUITY_SCORE: 36

## 2023-05-28 NOTE — PLAN OF CARE
A-flutter on tele. Pt voiding on bedside commode. Denies pain. PRN albuterol inhaler given for sob and effective. Pt alert and oriented.   Problem: Dysrhythmia  Goal: Normalized Cardiac Rhythm  Outcome: Progressing     Problem: Gas Exchange Impaired  Goal: Optimal Gas Exchange  Outcome: Progressing

## 2023-05-28 NOTE — PLAN OF CARE
Problem: Risk for Delirium  Goal: Improved Sleep  Outcome: Progressing     Problem: Dysrhythmia  Goal: Normalized Cardiac Rhythm  Outcome: Progressing     Problem: Gas Exchange Impaired  Goal: Optimal Gas Exchange  Outcome: Progressing    Goal Outcome Evaluation:  VSS, afebrile. Tele Afib. Purewick in place overnight due to diuresis with IV lasix. Denies pain. AxOx4, uses call light for needs.

## 2023-05-28 NOTE — PROGRESS NOTES
[facility] [place]    Medicine Progress Note - Hospitalist Service    Date of Admission:  5/24/2023    Assessment & Plan       SUMMARY: Mary Kay Tejada is a 73 year old female admitted on 5/24/2023. She has a past medical history of paroxysmal atrial flutter on apixaban, COPD, hyperlipidemia, diabetes mellitus type 2 with neuropathy, reflux, dizziness, tobacco use, fibromyalgia and chronic pain syndrome, who presents with shortness of breath and elevated heart rate in the 170s range.    On admission was tachycardic 170, otherwise stable vitals.  BMP nonremarkable, magnesium low 1.5, , CXR shows small to moderate right pleural effusion. Adjacent opacity in the basal right chest consistent with multisegment lower/middle lobe atelectasis, similar to February 2023. There is minimal pleural fluid in the left posterior costophrenic sulcus; lactate 1.3, procalcitonin negative, negative troponin, negative for DVT on US; EKG shows atrial flutter. Patient was given extra diltiazem; admitted and cardiology consulted. Given steroid burst (only 3 days initially) and doxycycline for anti-inflammatory (noted long QTc so avoiding azithromycin). Also hgb noted at 7.9 but no symptoms though prior in February was 9.2. On telemetry.  Following the hemoglobin for anemia serially. Admission diagnoses: atrial flutter, copd exacerbation, and anemia.      Aflutter, aortic stensosis, volume status.  Remains rate controlled and transitioned to po digoxin 5/25. Cardiology involved TAVR team in anticipation of future intervention. Jardiance added for both DM2 and heart health. Iv diuresis started 5/27 and continuing 5/28 (upon discharge likely back to usual po regimen).      COPD exacerbation-  Increased steroid burst to 40mg po every day 5/25; weaning 02 back to baseline is goal; from 7L 5/25 to now  4L on 5/26/2023. Receiving azithromycin as well. Duonebs. Closing towards baseline at 3L NC but will need ambulation  "trial prior to discharge in case may require exertional supplemental 02.      Acute anemia-  serially following hgb w/ downward trend and reports of darker colored but non-melanotic stools; subsequently with a critical low hgb at 6.9 on 5/26/2023 early AM, received transfusion. Hemolysis labs ordered 5/26. On 5/27/2023 consulted hematology for the unexplained anemia and peripheral smear demonstrating \"occasional teardrop forms and rare red cell fragments.\" Felt most likely iron deficiency. Anticipate oral or iv iron therapy but will defer to hematology.     - - - - -     Hx of heart failure with preserved ejection fraction  Volume overload  A- small-moderate right sided effusion and minimal of left, otherwise does not appear overtly volume up; had thoracentesis in January. BNP not overtly elevated on admit at 176 vs previously 867 in February. No rales or extremity edema. Cardiology giving iv diuresis for 5/27/2023.   P:  - diuresis defer to Cardiology   - currently IV 5/27 and 5 days    - likely resume pta po regimen upon discharge   - I/Os, weights  - BNP was 176 vs 867 in February  - given anemia, concern for bleed, hold off on a thoracentesis at this time      Anemia, microcytic   Hx of gastric ulcer  Assessment: The delta was concerning with hemoglobin at 7.9 though earlier was 9.2 in February.  However, no signs or symptoms concerning for bleed, no melena or hematochezia or hematemesis.  She does have a prior history of prior ulcerations though. with a critical low hgb at 6.9 on 5/26/2023 early AM, received transfusion. Also peripheral smear and hemolysis labs ordered. Would enlist GI to help if develops symptoms concerning for a GI process. Smear does show \"occasional teardrop forms and rare red cell fragments\" which could be consistent with aortic stenosis but question threshold to investigate other proceses, autoimmune? Felt to be consistent w/ PAULINA.   Plan:  - Consult to hematology, appreciate "    -Anticipate oral or iv iron therapy but will defer to hematology.   - lactate dehydrogenase, LFTs, haptoglobin , as well as a reticulocyte count)  -Serially follow hemoglobin - recheck then q12 hrs  - see below regarding continuing reflux meds   - transfuse if hgb < 7    - consent obtained for pRBC transfusions on 5/25     Atrial flutter   Shortness of breath  Long QTc  Assessment: improving on digoxin; transitioned to po per cards 5/25. stable  Plan:   - consult to cardiology, appreciate   - digoxin per cardiology  - telemetry  - monitor clinically  - continue apixaban   - avoid QTc-prolonging medications (ie ordering doxycycline instead of azithromycin)     COPD exacerbation  Acute respiratory failure with hypoxia  Assessment: required 5L on admit; her baseline is 2.5 L PTA.  Suspect the exacerbation could be a trigger exacerbating the above primary issue, aflutter.  Discussed with the patient and her son about steroids and anti-inflammatories.  Given the long QTc we will start with doxycycline for anti-inflammatory antibiotic. Home regimen of ProAir and DuoNeb and Breo. Does not appears overtly volume up and CXR effusions are stable in comparison to prior. Her 02 needs are labile, from 4-7L on only 5/25-- increased steroids to 40mg and appears to be helping-- now at 4L NC on 5/26/2023   Plan:   - started initial steroids: low dose 20mg daily   - on 5/25/2023 increased to 40mg po daily -> continue  - continue doxycycline 100mg po bid, x3 days in total (completing 5/26)  - continue home medications  - duonebs, adjust as per RT  - 02 and wean as able back to baseline of 2.5L NC  - continue PTA lasix   - additional dose iv after transfusion 5/26/2023       Reflux  A/P-see issue above as well, continue PPI Carafate     Hyperlipidemia  A/P-continue statin    Diabetes mellitus type 2 with neuropathy  Assessment: On metformin, has neuropathy. Sugars above goal, increased scale 5/25; now stable.  Plan:  - add on Hgb  A1c resulted at 6.1 on 5/25  -Hypoglycemia protocol  -Hold metformin while admitted to the hospital  -Sliding scale insulin, increased to Medium dose   -Continue gabapentin     Hypomagnesemia  A/p- replacement protocol; continue home magnesium tomorrow    Hypertension  A- pressures are soft. On diltizem for both htn and aflutter. On diuretic, continued.   P:  - diltiazem as per cardiology - stopped 5/26/2023   - continue PTA lasix     Dizziness  A/p- stable but can continue PRN meclizine     Tobacco use  A/p- encouraged decreasing and eventual cessation; continue nicotine inhaler     fibromyalgia   chronic pain syndrome  Assessment: Currently pain stable.  She is on a regimen of oxycodone.   Plan:  -Continue for now but modified with a lower range available: 5-10mg q6 hrs prn (instead of scheduled 10)        Diet: Combination Diet Low Saturated Fat Na <2400mg Diet, No Caffeine Diet    DVT Prophylaxis: Pneumatic Compression Devices, Ambulate every shift and no pharmaceutical given worsening anemia   Chairez Catheter: Not present  Lines: None     Cardiac Monitoring: ACTIVE order. Indication: Acute decompensated heart failure (48 hours)  Code Status: Full Code      Clinically Significant Risk Factors         # Hypokalemia: Lowest K = 3.2 mmol/L in last 2 days, will replace as needed       # Hypoalbuminemia: Lowest albumin = 3 g/dL at 5/26/2023  1:07 PM, will monitor as appropriate     # Hypertension: Noted on problem list         # Moderate Malnutrition: based on nutrition assessment, PRESENT ON ADMISSION        Disposition Plan      Expected Discharge Date: 05/28/2023      Destination: home with family;home with help/services  Discharge Comments: if hgb and HR stable, likely home          Rayo Mendoza MD  Hospitalist Service  [facility]  Securely message with [email or web address] (more info)  Text page via [employer] Paging/Directory    ______________________________________________________________________    Interval History   - no acute events  - pt is still on 3L but feels subjectively better  - we discussed hematology input and possibly iron therapy  - she has no new questions or concerns or symptoms  - discussed possibly discharge tomorrow 5/29 if everything is stable and she understands not guaranteed depending on how she is clinically doing    Physical Exam   Vital Signs: Temp: 98.2  F (36.8  C) Temp src: Oral BP: 127/62 Pulse: (!) 121   Resp: 16 SpO2: 97 % O2 Device: Nasal cannula Oxygen Delivery: 3 LPM  Weight: 138 lbs 8 oz     General: alert, oriented, and in no acute distress  Pulmonary: coarse, some decreased breath sounds, but no rales or wheezes; on 3L in AM  CVS: irregularly irregular, no murmurs, rubs, or gallops; no blatant jugular venous distention; mild lower extremity edema most prominent in feet and extremities remain warm to the touch  GI: soft, nontender, BS+, no rebound or guarding, no conspicuous organomegaly   Neuro: nonfocal, moves all extremities of own volition  Psych: appropriate         Medical Decision Making       49 MINUTES SPENT BY ME on the date of service doing chart review, history, exam, documentation & further activities per the note.      Data   ------------------------- PAST 24 HR DATA REVIEWED -----------------------------------------------    I have personally reviewed the following data over the past 24 hrs:    7.5  \   9.0 (L)   / 283     145 98 25 /  142 (H)   3.2 (L) 40 (H) 0.63 \       Ferritin:  N/A % Retic:  N/A LDH:  N/A       Imaging results reviewed over the past 24 hrs:   No results found for this or any previous visit (from the past 24 hour(s)).

## 2023-05-28 NOTE — PLAN OF CARE
Major Shift Events:  A/Ox4. VSS. 2L NC from 3L. Adequate I/O's. + occult blood stool-hem/onc consulted-iron infusions started. Pt was Aflutter RVR-dilt gtt started and 2.5pm IV lopressor   Plan: Continue to diurese, dilt gtt and rate control. Discussion if poor rate control, possible DELROY cardioversion. Also o/p TAVR workup/   For vital signs and complete assessments, please see documentation flowsheets.   Goal Outcome Evaluation:

## 2023-05-28 NOTE — PROGRESS NOTES
"[facility] Inpatient Progress Note    Patient: Mary Kay Tejada  MRN: [id number]  Date of Service: May 28, 2023         Reason for Visit    #.  Severe microcytic anemia    Assessment and Plan     Cancer Staging   No matching staging information was found for the patient.    #.  Chronic iron deficiency anemia suspected occult blood loss  #.  On long-term anticoagulant use  #.  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  Records need to be verified     She has persistent macrocytosis with decreased MCH, MCHC and elevated RDW.  Iron studies show iron saturation of 3% although ferritin is at 130.  Her iron and hemogram study pattern is consistent with iron deficiency anemia.  Her hemoglobin today is 9 which is stable. Stool for occult blood test was positive.     She wishes to minimize procedures specifically for GI endoscopy.   I recommended to start IV iron with IV sucrose 300 mg today.  She might need to get the second dose in a few days as an outpatient.     Once she is discharged from hospital, I will arrange outpatient follow-up in our clinic for ongoing monitoring and management of chronic iron deficiency anemia.    ______________________________________________________________________________    History of Present Illness    Overnight events and notes were reviewed.  No new concern.    Review of Systems    Apart from describing in HPI, the remainder of comprehensive ROS was negative.    Physical Exam    /65 (BP Location: Left arm)   Pulse 92   Temp 98.8  F (37.1  C) (Oral)   Resp 17   Ht 1.676 m (5' 6\")   Wt 62.8 kg (138 lb 8 oz)   SpO2 93%   BMI 22.35 kg/m      General: alert, awake, not in acute distress  HEENT: Head: Normal, normocephalic, atraumatic.  Nose: Normal external nose, mucus membranes and septum.  Skin: normal. no rash or abnormalities  CNS: non focal.     Lab Results    Recent Results (from the past 24 hour(s))   Glucose by meter    Collection Time: 05/27/23  4:07 PM "   Result Value Ref Range    GLUCOSE BY METER POCT 227 (H) 70 - 99 mg/dL   Hemoglobin    Collection Time: 05/27/23  6:09 PM   Result Value Ref Range    Hemoglobin 9.3 (L) 11.7 - 15.7 g/dL   Glucose by meter    Collection Time: 05/27/23  9:44 PM   Result Value Ref Range    GLUCOSE BY METER POCT 251 (H) 70 - 99 mg/dL   Magnesium    Collection Time: 05/28/23  4:45 AM   Result Value Ref Range    Magnesium 1.7 (L) 1.8 - 2.6 mg/dL   Basic metabolic panel    Collection Time: 05/28/23  4:45 AM   Result Value Ref Range    Sodium 145 136 - 145 mmol/L    Potassium 3.2 (L) 3.5 - 5.0 mmol/L    Chloride 98 98 - 107 mmol/L    Carbon Dioxide (CO2) 40 (H) 22 - 31 mmol/L    Anion Gap 7 5 - 18 mmol/L    Urea Nitrogen 25 8 - 28 mg/dL    Creatinine 0.63 0.60 - 1.10 mg/dL    Calcium 9.0 8.5 - 10.5 mg/dL    Glucose 99 70 - 125 mg/dL    GFR Estimate >90 >60 mL/min/1.73m2   CBC with platelets    Collection Time: 05/28/23  4:45 AM   Result Value Ref Range    WBC Count 7.5 4.0 - 11.0 10e3/uL    RBC Count 4.16 3.80 - 5.20 10e6/uL    Hemoglobin 9.0 (L) 11.7 - 15.7 g/dL    Hematocrit 31.5 (L) 35.0 - 47.0 %    MCV 76 (L) 78 - 100 fL    MCH 21.6 (L) 26.5 - 33.0 pg    MCHC 28.6 (L) 31.5 - 36.5 g/dL    RDW 22.9 (H) 10.0 - 15.0 %    Platelet Count 283 150 - 450 10e3/uL   Glucose by meter    Collection Time: 05/28/23  7:36 AM   Result Value Ref Range    GLUCOSE BY METER POCT 142 (H) 70 - 99 mg/dL   Occult blood stool    Collection Time: 05/28/23  8:18 AM   Result Value Ref Range    Occult Blood Positive (A) Negative   Glucose by meter    Collection Time: 05/28/23 11:06 AM   Result Value Ref Range    GLUCOSE BY METER POCT 139 (H) 70 - 99 mg/dL   Potassium    Collection Time: 05/28/23  1:02 PM   Result Value Ref Range    Potassium 4.5 3.5 - 5.0 mmol/L        Imaging    Echocardiogram Complete    Result Date: 5/25/2023  [id number]  [address] [address]  Name: ALVAREZ HINDS MRN:  [id number] : 1950 Study Date: 2023 01:44 PM Age: 73 yrs Gender: Female Patient Location: [facility] Reason For Study: Aflutter Ordering Physician: SYDNIE COLLADO Performed By: LING  BSA: 1.7 m2 Height: 65.5 in Weight: 143 lb HR: 82 BP: 114/62 mmHg ______________________________________________________________________________ Procedure Compared to the prior study dated 23, there are changes as noted. ______________________________________________________________________________ Interpretation Summary  Compared to the prior study dated 23, there are changes as noted. Aortic stenosis is now severe. The left ventricle is normal in size. Left ventricular function is normal.The ejection fraction is 60-65%. The right ventricle is mildly dilated. The left atrium is mildly dilated. Severe valvular aortic stenosis. There is mild to moderate (1-2+) aortic regurgitation. Small pericardial effusion  ______________________________________________________________________________ Left Ventricle The left ventricle is normal in size. Left ventricular function is normal.The ejection fraction is 60-65%. There is normal left ventricular wall thickness. Left ventricular diastolic function is abnormal. Diastolic Doppler findings (E/E' ratio and/or other parameters) suggest left ventricular filling pressures are increased. No regional wall motion abnormalities noted.  Right Ventricle The right ventricle is mildly dilated. The right ventricular systolic function is normal.  Atria The left atrium is mildly dilated. Right atrial size is normal. There is no color Doppler evidence of an atrial shunt.  Mitral Valve There is mild mitral annular calcification. Mitral valve leaflets appear normal. There is mild (1+) mitral regurgitation.  Tricuspid Valve The tricuspid valve is not well visualized, but is grossly normal. No tricuspid regurgitation.  Aortic Valve There is mild to moderate (1-2+) aortic regurgitation. Severe valvular  aortic stenosis. Mean gradient of 47 mmHg, Peak velocity of 4.3 m/s, AV raij DI 0.25.  Pulmonic Valve The pulmonic valve is not well visualized. This degree of valvular regurgitation is within normal limits.  Vessels The aorta root is normal. Normal size ascending aorta. IVC diameter <2.1 cm collapsing >50% with sniff suggests a normal RA pressure of 3 mmHg.  Pericardium Small pericardial effusion.  ______________________________________________________________________________ MMode/2D Measurements & Calculations RVDd: 4.0 cm IVSd: 0.67 cm LVIDd: 5.3 cm LVIDs: 3.5 cm LVPWd: 1.1 cm FS: 32.9 %  LV mass(C)d: 170.1 grams LV mass(C)dI: 98.2 grams/m2 Ao root diam: 2.6 cm LA dimension: 4.9 cm asc Aorta Diam: 2.7 cm LA/Ao: 1.9 LVOT diam: 2.1 cm LVOT area: 3.3 cm2 LA Volume (BP): 78.5 ml LA Volume Index (BP): 45.4 ml/m2  LA Volume Indexed (AL/bp): 52.5 ml/m2 RWT: 0.42  Doppler Measurements & Calculations MV E max raji: 181.0 cm/sec MV A max raji: 54.0 cm/sec MV E/A: 3.4 MV max P.8 mmHg MV mean P.5 mmHg MV V2 VTI: 50.3 cm MVA(VTI): 1.6 cm2 MV P1/2t max raji: 236.2 cm/sec MV P1/2t: 54.1 msec MVA(P1/2t): 4.1 cm2 MV dec slope: 1279 cm/sec2 MV dec time: 0.13 sec Ao V2 max: 431.0 cm/sec Ao max P.0 mmHg Ao V2 mean: 325.6 cm/sec Ao mean P.8 mmHg Ao V2 VTI: 92.5 cm ADALBERTO(I,D): 0.89 cm2 ADALBERTO(V,D): 0.82 cm2 LV V1 max P.5 mmHg LV V1 max: 106.4 cm/sec LV V1 VTI: 24.8 cm SV(LVOT): 81.9 ml SI(LVOT): 47.3 ml/m2 PA V2 max: 124.9 cm/sec PA max P.2 mmHg PA mean PG: 3.5 mmHg PA V2 VTI: 25.1 cm AV Raji Ratio (DI): 0.25 ADALBERTO Index (cm2/m2): 0.51  E/E': 26.0 E/E' av.4 Lateral E/e': 24.8 Medial E/e': 26.0 Peak E' Raji: 7.0 cm/sec  ______________________________________________________________________________ Report approved by: Diamante Rubio 2023 03:53 PM          Signed by: Chandra Major MD

## 2023-05-28 NOTE — PROGRESS NOTES
Called by floor, patient having heart rates about 180 for the last 30 minutes.  Blood pressure stable.  Asymptomatic.  We will start short-term Cardizem drip overnight, transition over to some IV metoprolol tomorrow versus oral metoprolol.  If unable to get good rate control might need to consider urgent DELROY guided cardioversion on Tuesday.  LF

## 2023-05-28 NOTE — PROGRESS NOTES
CARDIOLOGY PROGRESS NOTE      Assessment/Plan:  1.  Atrial flutter (H)-atypical, asymptomatic, rate control strategy currently. Given the stability at this point not pursuing urgent cardioversion and can discuss attempt as an outpatient.  2.  Aortic stenosis-severe with peak velocity of 4.3 m/s, mean gradient of 47 mmHg.  We will need to have angiography to evaluate for TAVR.  Had CTA Friday.  3.  Paroxysmal Atrial Fibrillation-status post pulmonary vein isolation ablation in .  Anticoagulation with Eliquis.  4.  COPD exacerbation (H)-suspect this is her biggest issue, treatment per primary care team.  Needs to discontinue tobacco use.  5.  Heart failure- suspect mild in the setting of preserved ejection fraction of 60 to 65%.  Oxygen saturation 97% on 3 L, creatinine preserved.  Continue IV diuretic twice today.  6.  Type 2 diabetes mellitus with hypoglycemia (H)-so noted and hemoglobin A1c 6.1 and started Jardiance.  7.  Pleural effusion- given anemia hold off on thoracentesis.  Did have thoracentesis in January.  8.  Anemia- hemoglobin significantly down to 6.9.  Received blood as well as hematology/oncology consult.  Hemoglobin went up to 9.3 but now back down to 8.9, defer work-up to primary care team.    9.  Chronic pain syndrome-so noted and defer to primary care team, felt secondary to Fibromyalgia.    Plan:  1.  Continue IV diuresis twice today.    Discharge Plannin.  Cardiac barrier to discharge would be resolution of excess of fluid and rate control.  Anticipate tomorrow.  2.  Can follow with Dr. Bao Pizano primary cardiologist.  3.  Will need to be seen by TAVR clinic.     LOS: 4 days     Subjective:  Interval History:    72-year-old white female being seen on fifth day of hospitalization.  Admits to some mild shortness of breath, some mild bipedal edema.  Denies any chest pains, palpitations, significant shortness of breath, PND, orthopnea, syncope, dizziness.    Medications    apixaban  "ANTICOAGULANT  5 mg Oral BID     atorvastatin  20 mg Oral At Bedtime     digoxin  125 mcg Oral Daily     diltiazem  30 mg Oral Q8H     empagliflozin  10 mg Oral Daily     fluticasone-vilanterol  1 puff Inhalation Daily     [Held by provider] furosemide  40 mg Oral BID     gabapentin  600 mg Oral TID     insulin aspart  1-7 Units Subcutaneous TID AC     insulin aspart  1-5 Units Subcutaneous At Bedtime     magnesium oxide  400 mg Oral Daily     nicotine   Transdermal Q8H     pantoprazole  40 mg Oral Daily     sodium chloride (PF)  3 mL Intracatheter Q8H     sucralfate  1 g Oral 4x Daily AC & HS     Objective:   Vital signs in last 24 hours:  Temp:  [97.8  F (36.6  C)-98.6  F (37  C)] 98.2  F (36.8  C)  Pulse:  [] 121  Resp:  [16-20] 16  BP: (100-150)/(52-85) 127/62  SpO2:  [91 %-99 %] 97 %    Physical Exam:  /62 (BP Location: Right arm)   Pulse (!) 121   Temp 98.2  F (36.8  C) (Oral)   Resp 16   Ht 1.676 m (5' 6\")   Wt 62.8 kg (138 lb 8 oz)   SpO2 97%   BMI 22.35 kg/m      General Appearance:    Alert, cooperative, no distress, appears stated age   Head:    Normocephalic, without obvious abnormality, atraumatic   Throat:   Lips, mucosa, and tongue normal; teeth and gums normal   Neck:   Supple, symmetrical, trachea midline, no adenopathy;        thyroid:  No enlargement/tenderness/nodules; no carotid    bruit or JVD   Back:     Symmetric, no curvature, ROM normal, no CVA tenderness   Lungs:     Clear to auscultation bilaterally, respirations unlabored   Chest wall:    No tenderness or deformity   Heart:    Irregularly irregular, S1 and S2 normal, 2/6 systolic ejection murmur, mid peaking, no rub   or gallop   Abdomen:     Soft, non-tender, bowel sounds active all four quadrants,     no masses, no organomegaly   Extremities:   Normal, atraumatic, no cyanosis, 1/4 bipedal edema   Pulses:   2+ and symmetric all extremities   Skin:   Skin color, texture, turgor normal, no rashes or lesions "     Cardiographics:      ECG: Personally reviewed by myself shows atypical atrial flutter, leftward axis, nonspecific ST-T wave changes.    Echocardiogram:   Compared to the prior study dated 1/11/23, there are changes as noted. Aortic stenosis is now severe.  The left ventricle is normal in size. Left ventricular function is normal.The ejection fraction is 60-65%.  The right ventricle is mildly dilated.  The left atrium is mildly dilated.  Severe valvular aortic stenosis. There is mild to moderate (1-2+) aortic regurgitation.  Small pericardial effusion      Lab Results:   Recent Labs   Lab 05/28/23  0445   WBC 7.5   HGB 9.0*   HCT 31.5*        Recent Labs   Lab 05/28/23  0445      CO2 40*   BUN 25   .       Lab Results   Component Value Date    TROPONINI 0.07 05/24/2023

## 2023-05-29 LAB
ANION GAP SERPL CALCULATED.3IONS-SCNC: 6 MMOL/L (ref 5–18)
BUN SERPL-MCNC: 25 MG/DL (ref 8–28)
CALCIUM SERPL-MCNC: 10.4 MG/DL (ref 8.5–10.5)
CHLORIDE BLD-SCNC: 97 MMOL/L (ref 98–107)
CO2 SERPL-SCNC: 39 MMOL/L (ref 22–31)
CREAT SERPL-MCNC: 0.7 MG/DL (ref 0.6–1.1)
GFR SERPL CREATININE-BSD FRML MDRD: >90 ML/MIN/1.73M2
GLUCOSE BLD-MCNC: 116 MG/DL (ref 70–125)
GLUCOSE BLDC GLUCOMTR-MCNC: 105 MG/DL (ref 70–99)
GLUCOSE BLDC GLUCOMTR-MCNC: 160 MG/DL (ref 70–99)
GLUCOSE BLDC GLUCOMTR-MCNC: 223 MG/DL (ref 70–99)
GLUCOSE BLDC GLUCOMTR-MCNC: 95 MG/DL (ref 70–99)
HOLD SPECIMEN: NORMAL
MAGNESIUM SERPL-MCNC: 1.9 MG/DL (ref 1.8–2.6)
POTASSIUM BLD-SCNC: 3.8 MMOL/L (ref 3.5–5)
SODIUM SERPL-SCNC: 142 MMOL/L (ref 136–145)

## 2023-05-29 PROCEDURE — 83735 ASSAY OF MAGNESIUM: CPT | Performed by: STUDENT IN AN ORGANIZED HEALTH CARE EDUCATION/TRAINING PROGRAM

## 2023-05-29 PROCEDURE — 99232 SBSQ HOSP IP/OBS MODERATE 35: CPT | Performed by: INTERNAL MEDICINE

## 2023-05-29 PROCEDURE — 99233 SBSQ HOSP IP/OBS HIGH 50: CPT | Performed by: STUDENT IN AN ORGANIZED HEALTH CARE EDUCATION/TRAINING PROGRAM

## 2023-05-29 PROCEDURE — 36415 COLL VENOUS BLD VENIPUNCTURE: CPT | Performed by: STUDENT IN AN ORGANIZED HEALTH CARE EDUCATION/TRAINING PROGRAM

## 2023-05-29 PROCEDURE — 250N000013 HC RX MED GY IP 250 OP 250 PS 637: Performed by: INTERNAL MEDICINE

## 2023-05-29 PROCEDURE — 210N000002 HC R&B HEART CARE

## 2023-05-29 PROCEDURE — 250N000009 HC RX 250: Performed by: INTERNAL MEDICINE

## 2023-05-29 PROCEDURE — 250N000013 HC RX MED GY IP 250 OP 250 PS 637: Performed by: STUDENT IN AN ORGANIZED HEALTH CARE EDUCATION/TRAINING PROGRAM

## 2023-05-29 PROCEDURE — 80048 BASIC METABOLIC PNL TOTAL CA: CPT | Performed by: STUDENT IN AN ORGANIZED HEALTH CARE EDUCATION/TRAINING PROGRAM

## 2023-05-29 PROCEDURE — 250N000011 HC RX IP 250 OP 636: Performed by: INTERNAL MEDICINE

## 2023-05-29 RX ADMIN — EMPAGLIFLOZIN 10 MG: 10 TABLET, FILM COATED ORAL at 09:38

## 2023-05-29 RX ADMIN — SUCRALFATE 1 G: 1 TABLET ORAL at 12:35

## 2023-05-29 RX ADMIN — APIXABAN 5 MG: 5 TABLET, FILM COATED ORAL at 20:04

## 2023-05-29 RX ADMIN — PANTOPRAZOLE SODIUM 40 MG: 40 TABLET, DELAYED RELEASE ORAL at 09:38

## 2023-05-29 RX ADMIN — METOPROLOL TARTRATE 12.5 MG: 25 TABLET, FILM COATED ORAL at 21:06

## 2023-05-29 RX ADMIN — DILTIAZEM HYDROCHLORIDE 30 MG: 30 TABLET, FILM COATED ORAL at 16:02

## 2023-05-29 RX ADMIN — SUCRALFATE 1 G: 1 TABLET ORAL at 16:56

## 2023-05-29 RX ADMIN — GABAPENTIN 600 MG: 600 TABLET, FILM COATED ORAL at 09:38

## 2023-05-29 RX ADMIN — METOPROLOL TARTRATE 2.5 MG: 5 INJECTION INTRAVENOUS at 09:39

## 2023-05-29 RX ADMIN — APIXABAN 5 MG: 5 TABLET, FILM COATED ORAL at 09:38

## 2023-05-29 RX ADMIN — SUCRALFATE 1 G: 1 TABLET ORAL at 21:06

## 2023-05-29 RX ADMIN — GABAPENTIN 600 MG: 600 TABLET, FILM COATED ORAL at 14:58

## 2023-05-29 RX ADMIN — FLUTICASONE FUROATE AND VILANTEROL TRIFENATATE 1 PUFF: 200; 25 POWDER RESPIRATORY (INHALATION) at 09:41

## 2023-05-29 RX ADMIN — Medication 400 MG: at 09:38

## 2023-05-29 RX ADMIN — METOPROLOL TARTRATE 2.5 MG: 5 INJECTION INTRAVENOUS at 01:36

## 2023-05-29 RX ADMIN — ATORVASTATIN CALCIUM 20 MG: 10 TABLET, FILM COATED ORAL at 21:06

## 2023-05-29 RX ADMIN — DIGOXIN 125 MCG: 125 TABLET ORAL at 09:38

## 2023-05-29 RX ADMIN — FUROSEMIDE 40 MG: 40 TABLET ORAL at 16:02

## 2023-05-29 RX ADMIN — SUCRALFATE 1 G: 1 TABLET ORAL at 09:38

## 2023-05-29 RX ADMIN — GABAPENTIN 600 MG: 600 TABLET, FILM COATED ORAL at 20:04

## 2023-05-29 RX ADMIN — Medication 5 MG/HR: at 13:30

## 2023-05-29 ASSESSMENT — ACTIVITIES OF DAILY LIVING (ADL)
ADLS_ACUITY_SCORE: 35
ADLS_ACUITY_SCORE: 35
ADLS_ACUITY_SCORE: 36
ADLS_ACUITY_SCORE: 35
ADLS_ACUITY_SCORE: 36
ADLS_ACUITY_SCORE: 35
ADLS_ACUITY_SCORE: 36

## 2023-05-29 NOTE — CONSULTS
CONSULTING PHYSICIAN   Rayo Mendoza MD     REASON FOR CONSULTATION   Anemia, required 1 transfusion, received iron on 5/29, recurrent tachycardia, cardiology recommended GI input.     IMPRESSION/RECOMMENDATIONS   Chronic microcytic anemia:  This has been present since at least 2018, most likely slow, small bowel source.  Absence of progression over time argues against neoplasm.  She relates a history of gastric ulcer, but going back to 2009 I can find no evidence of this on previous endoscopies.  Last colonoscopy did show small adenomas, however none of concern.    Her preference is to proceed with IV iron supplementation and monitoring per discussions with hematology service.  She declines EGD or colonoscopy at this time.  I think a trial of this is reasonable, particularly given that she has not had significant blood loss and supplementation may be adequate to replete her stores over time.    50 minutes of time spent in care including history, physical, review of previous records, imaging results, laboratories, documentation.       HISTORY OF PRESENT ILLNESS   Mary Kay Tejada is a pleasant 73 year old female with a past medical history significant for aortic stenosis, atrial fibrillation with ablation in 2012, COPD on chronic oxygen, diabetes.  She was admitted to the hospital on 5/24 with shortness of breath, tachycardia.  She was found to be in atrial fibrillation with COPD exacerbation.  Laboratories showed microcytic anemia, present since at least 2018.  She was transfused 1 unit of blood for hemoglobin of 7.  She denies any nausea or vomiting.  No melena, hematochezia, hematemesis, epistaxis.  She does take Eliquis and does have multiple bruises over her arms and legs.  She denies dysphagia, dysphonia, abdominal pain.  She has a poor appetite, but is eating okay.  Last upper endoscopy was in 2018.  This was normal.  Last colonoscopy was in 2019.  This showed 4 small  tubular adenomas that were resected and an inflammatory rectal polyp.     PAST HISTORY   Past Medical History:   Diagnosis Date     Anemia      Aortic stenosis      Atrial fibrillation (H)      Atrial flutter (H)      Benign neoplasm of adenomatous polyp     large intestine      Chronic constipation      Chronic pain syndrome      COPD (chronic obstructive pulmonary disease) (H)     Oxygen at night      Dependence on supplemental oxygen     Oxygen at noc, during the day as needed     Depression      Diabetes mellitus (H)      Dry eye syndrome      Fibromyalgia      Ganglion     left wrist     GERD (gastroesophageal reflux disease)      Hyperlipidemia      Hypertension      Hypokalemia      Infective otitis externa, unspecified     Created by Conversion      Larynx edema      Lung disease      Malignant neoplasm of vulva (H)     Created by Conversion [facility] Annotation: Apr 17 2007  8:24AM - Cammy Bui:  resection per Dr. Alfonso Mane 9/06;  Replacement Utility updated for latest IMO load     Medial epicondylitis      Onychomycosis      Osteoarthritis      Peptic ulcer      Polyneuropathy      Vulvar malignant neoplasm (H)       Past Surgical History:   Procedure Laterality Date     BIOPSY BREAST Right      BIOPSY BREAST Right 01/28/2015     BIOPSY BREAST Right 1/28/2015    Procedure: RIGHT BREAST BIOPSY AFTER WIRE LOCALIZATION AT 0940;  Surgeon: Renée Soriano MD;  [place] [facility] [place]:      BIOPSY OF BREAST, INCISIONAL      Description: Incisional Breast Biopsy;  Recorded: 11/13/2007;  Comments: benign     COLONOSCOPY N/A 6/14/2019    Procedure: COLONOSCOPY;  Surgeon: Eduardo Mora MD;  [place] [facility] [place]  Service: Gastroenterology     ESOPHAGOSCOPY, GASTROSCOPY, DUODENOSCOPY (EGD), COMBINED N/A 11/6/2018    Procedure: ESOPHAGOGASTRODUODENOSCOPY;  Surgeon: Lit Fernando MD;  [place] [facility] [place]:      HYSTERECTOMY       JOINT REPLACEMENT  Left     TKA     PICC TRIPLE LUMEN PLACEMENT  1/12/2023          ND ABLATE HEART DYSRHYTHM FOCUS      Description: Catheter Ablation Atrial Fibrillation;  Recorded: 07/31/2012;  Comments: 7/24/12 PVI with Dr. Gardiner and nilay to all 5 pulm veins and CTI fl ablation line as well.     ZZC SUPRACERV ABD HYSTERECTOMY      Description: Supracervical Hysterectomy;  Proc Date: 01/01/1985;  Comments: some cervix left!; ovaries intact; done for bleeding        Family History Social History   Family History   Problem Relation Age of Onset     Heart Failure Mother      Cancer Other         paternal HX-laryngeal      Alcoholism Sister      No Known Problems Daughter      No Known Problems Maternal Grandmother      No Known Problems Maternal Grandfather      No Known Problems Paternal Grandmother      No Known Problems Paternal Grandfather      No Known Problems Maternal Aunt      No Known Problems Paternal Aunt      Alcoholism Sister      Alcoholism Brother      Alcoholism Father      Cancer Paternal Uncle         Gastric-Alcohol     Cancer Paternal Uncle         gastric-Alcohol     Hereditary Breast and Ovarian Cancer Syndrome No family hx of      Breast Cancer No family hx of      Colon Cancer No family hx of      Endometrial Cancer No family hx of      Ovarian Cancer No family hx of     Social History     Socioeconomic History     Marital status:      Spouse name: None     Number of children: None     Years of education: None     Highest education level: None   Tobacco Use     Smokeless tobacco: Never     Tobacco comments:     seen by TTS inpatient on 3/31/22   Substance and Sexual Activity     Alcohol use: Yes     Comment: Alcoholic Drinks/day: very little     Drug use: No         MEDICATIONS & ALLERGIES   Medications Prior to Admission   Medication Sig Dispense Refill Last Dose     albuterol (PROAIR HFA/PROVENTIL HFA/VENTOLIN HFA) 108 (90 Base) MCG/ACT inhaler INHALE 2 PUFFS BY MOUTH EVERY 4 HOURS AS NEEDED FOR  WHEEZING 87.1 g 1 Past Week     apixaban ANTICOAGULANT (ELIQUIS) 5 MG tablet Take 1 tablet (5 mg) by mouth 2 times daily 180 tablet 2 5/24/2023     atorvastatin (LIPITOR) 20 MG tablet TAKE 1 TABLET(20 MG) BY MOUTH AT BEDTIME 90 tablet 2 5/23/2023     diltiazem ER COATED BEADS (CARDIZEM CD/CARTIA XT) 180 MG 24 hr capsule TAKE 1 CAPSULE(180 MG) BY MOUTH DAILY 90 capsule 3 5/24/2023     fluticasone-vilanterol (BREO ELLIPTA) 200-25 MCG/ACT inhaler Inhale 1 puff into the lungs daily 90 each 2 5/24/2023     furosemide (LASIX) 40 MG tablet Take 1 tablet (40 mg) by mouth 2 times daily 180 tablet 2 5/24/2023 at am     gabapentin (NEURONTIN) 600 MG tablet Take 1 tablet (600 mg) by mouth 3 times daily 270 tablet 2 5/24/2023     ipratropium - albuterol 0.5 mg/2.5 mg/3 mL (DUONEB) 0.5-2.5 (3) MG/3ML neb solution Take 1 vial (3 mLs) by nebulization every 4 hours as needed for shortness of breath / dyspnea or wheezing 90 mL 3 Past Week     magnesium oxide (MAG-OX) 400 MG tablet Take 1 tablet (400 mg) by mouth daily 30 tablet 0 5/24/2023     meclizine (ANTIVERT) 25 MG tablet TAKE 1 TABLET(25 MG) BY MOUTH THREE TIMES DAILY AS NEEDED FOR DIZZINESS OR NAUSEA 45 tablet 5 Unknown     metFORMIN (GLUCOPHAGE) 500 MG tablet TAKE 1 TABLET(500 MG) BY MOUTH TWICE DAILY WITH MEALS 180 tablet 1 5/24/2023     nicotine (NICOTROL) 10 MG inhaler Use 1 cartridge as needed for urge to smoke by puffing over course of 20min.  Use 6-16 cart/day; reduce number of cart/day over 6-12 weeks. 168 each 1  at has not started yet     nystatin (NYSTOP) powder [NYSTATIN (NYSTOP) POWDER] Apply 1 application topically 2 (two) times a day as needed. 2-3 times to affected area(s). 60 g 3 Unknown     omeprazole (PRILOSEC) 20 MG DR capsule Take 20 mg by mouth daily   5/24/2023     oxyCODONE IR (ROXICODONE) 10 MG tablet Take 1 tablet (10 mg) by mouth every 6 hours as needed for moderate to severe pain 120 tablet 0 5/23/2023     potassium chloride ER (KLOR-CON M) 20 MEQ  "CR tablet Take 2 tablets (40 mEq) by mouth daily 60 tablet 1 5/24/2023     sucralfate (CARAFATE) 1 GM tablet TAKE 1 TABLET BY MOUTH FOUR TIMES DAILY. CRUSH TABLET AND MIX IT WITH A LITTLE WATER THEN SWALLOW 360 tablet 3 5/24/2023     ACCU-CHEK SOFTCLIX LANCETS lancets [ACCU-CHEK SOFTCLIX LANCETS LANCETS] TEST THREE TIMES DAILY 300 each 3      BD ULTRA-FINE SHORT PEN NEEDLE 31 gauge x 5/16\" Ndle [BD ULTRA-FINE SHORT PEN NEEDLE 31 GAUGE X 5/16\" NDLE] TEST FOUR TIMES DAILY WITH MEALS AND AT BEDTIME 400 each 3      blood-glucose meter (ONETOUCH VERIO IQ METER) Misc [BLOOD-GLUCOSE METER (ONETOUCH VERIO IQ METER) MISC] Check blood sugar three times a day. 1 each 0      diaper,brief,adult,disposable (ADULT BRIEFS - LARGE) Misc [DIAPER,BRIEF,ADULT,DISPOSABLE (ADULT BRIEFS - LARGE) MISC] Use 3-4 daily as needed for incontinence 120 each 6      generic lancets (FINGERSTIX LANCETS) [GENERIC LANCETS (FINGERSTIX LANCETS)] Dispense brand per patient's insurance at pharmacy discretion. 300 each 0      guaiFENesin-codeine (ROBITUSSIN AC) 100-10 MG/5ML solution Take 5-10 mLs by mouth every 4 hours as needed for cough 120 mL 0      naloxone (NARCAN) 4 MG/0.1ML nasal spray Spray 1 spray (4 mg) into one nostril alternating nostrils once as needed for opioid reversal every 2-3 minutes until assistance arrives 0.2 mL 0      ONETOUCH VERIO IQ test strip USE TO TEST THREE TIMES DAILY 300 strip 1         ALLERGIES   Allergies   Allergen Reactions     Celebrex [Celecoxib] Rash     patient had butterfly rash - \"lupus-like\"       Latex Rash         REVIEW OF SYSTEMS     See HPI for pertinent positives and negatives. A comprehensive review of systems was performed and was otherwise noncontributory.     OBJECTIVE   Vitals Blood pressure 105/55, pulse 91, temperature 98.6  F (37  C), temperature source Oral, resp. rate 18, height 1.676 m (5' 6\"), weight 62.1 kg (137 lb), SpO2 95 %, not currently breastfeeding.           Physical  Exam   GENERAL: " Chronically ill-appearing, no acute distress.    HEENT: atraumatic, anicteric, moist mucous membranes, neck soft/supple     PULMONARY: normal resp effort, breath sounds clear to auscultation bilaterally    CARDIOVASCULAR: Tachycardic, irregularly irregular.    ABDOMEN: soft, no tenderness, no distention, bowel sounds normal. No hepatosplenomegaly.     MUSCULOSKELETAL: joints grossly normal    NEUROLOGICAL: appropriate mental status, grossly intact    PSYCHIATRIC: normal mood, affect and insight    SKIN: Multiple bruises over arms and legs.    EXT: 1+ pitting edema to mid shins bilaterally.        LABORATORY    ELECTROLYTE PANEL   Recent Labs   Lab 05/29/23  1204 05/29/23  0749 05/29/23  0651 05/28/23  1628 05/28/23  1302 05/28/23  0736 05/28/23  0445 05/25/23  0737 05/25/23  0507   NA  --  142  --   --   --   --  145  --  136   POTASSIUM  --  3.8  --   --  4.5  --  3.2*  --  4.0   CHLORIDE  --  97*  --   --   --   --  98  --  99   CO2  --  39*  --   --   --   --  40*  --  29   * 116 95   < >  --    < > 99   < > 189*   CR  --  0.70  --   --   --   --  0.63  --  0.86   BUN  --  25  --   --   --   --  25  --  31*    < > = values in this interval not displayed.      HEMATOLOGY PANEL   Recent Labs   Lab 05/28/23  0445 05/27/23  1809 05/27/23  0543 05/26/23  1601 05/26/23  1152 05/25/23  1806 05/25/23  0507   HGB 9.0* 9.3* 8.9*   < > 8.7*   < > 7.3*   MCV 76*  --   --   --  74*  --  72*   WBC 7.5  --   --   --  10.8  --  7.6     --   --   --  341  --  357    < > = values in this interval not displayed.      LIVER AND PANCREAS PANEL   Recent Labs   Lab 05/26/23  1307 05/24/23  1015   AST 21 34   ALT 24 35   ALKPHOS 112 129*   BILITOTAL 0.9 1.3*   LIPASE  --  11     IMAGING STUDIES      Reviewed images of CT scan from 5/28/2023.    I have reviewed the current diagnostic and laboratory tests.                                                      Macario Collier MD    Thank you for the opportunity to participate  in the care of this patient.   Please feel free to call me with any questions or concerns.  Phone number [phone].

## 2023-05-29 NOTE — PLAN OF CARE
[id number]: A/ox4. Had to pause dilt drip for a period of time overnight, HR had dropped to low 50s, blood pressures remained stable. Denied pain. Gave scheduled lasix. Diuresing well. Purwick placed while sleeping. Ambulates SBA.       Problem: Plan of Care - These are the overarching goals to be used throughout the patient stay.    Goal: Optimal Comfort and Wellbeing  Outcome: Progressing     Problem: Dysrhythmia  Goal: Normalized Cardiac Rhythm  Outcome: Progressing

## 2023-05-29 NOTE — PROGRESS NOTES
[facility]    Medicine Progress Note - Hospitalist Service    Date of Admission:  5/24/2023    Assessment & Plan       SUMMARY: Mary Kay Tejada is a 73 year old female admitted on 5/24/2023. She has a past medical history of paroxysmal atrial flutter on apixaban, COPD, hyperlipidemia, diabetes mellitus type 2 with neuropathy, reflux, dizziness, tobacco use, fibromyalgia and chronic pain syndrome, who presents with shortness of breath and elevated heart rate in the 170s range.    On admission was tachycardic 170, otherwise stable vitals.  BMP nonremarkable, magnesium low 1.5, , CXR shows small to moderate right pleural effusion. Adjacent opacity in the basal right chest consistent with multisegment lower/middle lobe atelectasis, similar to February 2023. There is minimal pleural fluid in the left posterior costophrenic sulcus; lactate 1.3, procalcitonin negative, negative troponin, negative for DVT on US; EKG shows atrial flutter. Patient was given extra diltiazem; admitted and cardiology consulted. Given steroid burst (only 3 days initially) and doxycycline for anti-inflammatory (noted long QTc so avoiding azithromycin). Also hgb noted at 7.9 but no symptoms though prior in February was 9.2. On telemetry.  Following the hemoglobin for anemia serially. Admission diagnoses: atrial flutter, copd exacerbation, and anemia.      Aflutter, aortic stensosis, volume status. Recurrent tachycardia.  Remains rate controlled and transitioned to po digoxin 5/25. Cardiology involved TAVR team in anticipation of future intervention. Jardiance added for both DM2 and heart health. Iv diuresis for 5/27 and 5/28 and now 5/29/2023 back to Oral lasix. However on 5/28, the pt had recurrent tachycardia with heart rates up to 180 and transitioned to cardizem drip overnight and plan for metoprolol 5/29/2023 and possibly DELROY cardioversion on Tuesday. May need amiodarone overnight per cardiology (short term  "given copd hx)      COPD exacerbation-  Increased steroid burst to 40mg po every day 5/25; weaning 02 back to baseline is goal; from 7L 5/25 to now  4L on 5/26/2023. Receiving azithromycin as well. Duonebs. Closing towards baseline at 3L NC but will need ambulation trial prior to discharge in case may require exertional supplemental 02.      Acute anemia-  serially following hgb w/ downward trend and reports of darker colored but non-melanotic stools; subsequently with a critical low hgb at 6.9 on 5/26/2023 early AM, received transfusion. Hemolysis labs ordered 5/26. On 5/27/2023 consulted hematology for the unexplained anemia and peripheral smear demonstrating \"occasional teardrop forms and rare red cell fragments.\" Felt most likely iron deficiency, confirmed w/ labs; started on IV iron 5/29/2023. Will ask GI to assess (she had previously declined egd but is now open to it 5/29/2023)    - - - - -     Hx of heart failure with preserved ejection fraction  Volume overload  A- small-moderate right sided effusion and minimal of left, otherwise does not appear overtly volume up; had thoracentesis in January. BNP not overtly elevated on admit at 176 vs previously 867 in February. No rales or extremity edema. Cardiology giving iv diuresis for 5/27/2023.   P:  - diuresis defer to Cardiology   - currently IV 5/27 and 5 days    - likely resume pta po regimen upon discharge   - I/Os, weights  - BNP was 176 vs 867 in February  - given anemia, concern for bleed, hold off on a thoracentesis at this time    Anemia, microcytic   Hx of gastric ulcer  Assessment: The delta was concerning with hemoglobin at 7.9 though earlier was 9.2 in February. She does have a prior history of prior ulcerations though. with a critical low hgb at 6.9 on 5/26/2023 early AM, received transfusion. Also peripheral smear and hemolysis labs ordered. Smear showed \"teardrop forms and rare red cell fragments;\" hematology consulted and felt to be consistent w/ " PAULINA and started on iv iron 5/28. With positive heme study, revisited egd w/ patient 5/29/2023 -- she previously declined but now is open to it-- as such, asking GI to see.   Plan:  - Consult to hematology, appreciate    - appreciate IV iron therapy  -Serially follow hemoglobin   - Consult GI 5/29/2023 -- now open to idea of egd  - see below regarding continuing reflux meds   - transfuse if hgb < 7    - consent obtained for pRBC transfusions on 5/25   Reflux  A/P-see issue above as well, continue PPI Carafate    Atrial flutter   Shortness of breath  Long QTc  Assessment: improving on digoxin; transitioned to po per cards 5/25. Was stable for several days then  on 5/28, the pt had recurrent tachycardia with heart rates up to 180 and transitioned to cardizem drip overnight and plan for metoprolol 5/29/2023 and possibly DELROY cardioversion on Tuesday; may require amiodarone.  Plan:   - consult to cardiology, appreciate   -as per cardiology; may need cardioversion  - telemetry  - monitor clinically  - continue apixaban   - avoid QTc-prolonging medications (ie ordering doxycycline instead of azithromycin)     COPD exacerbation  Acute respiratory failure with hypoxia  Assessment: required 5L on admit; her baseline is 2.5 L PTA.  Suspect the exacerbation could be a trigger exacerbating the above primary issue, aflutter. Given the long QTc gave doxycycline for anti-inflammatory antibiotic. Home regimen of ProAir and DuoNeb and Breo. CXR effusions are stable in comparison to prior; no thoracentesis given anemia. Her 02 needs are now back at baseline 5/29/2023 (now at 2L).  Plan:   - initial steroids: low dose 20mg daily   - on 5/25/2023 increased to 40mg po daily -> completed course of steroid burst  - doxycycline 100mg po bid, x3 days in total (completed 5/26)  - continue home medications  - duonebs, adjust as per RT  - 02 and wean as able back to baseline of 2.5L NC  -Lasix as per cardiology     Hyperlipidemia  A/P-continue  statin    Diabetes mellitus type 2 with neuropathy  Assessment: On metformin, has neuropathy. Sugars above goal, increased scale 5/25; now stable.  Plan:  - add on Hgb A1c resulted at 6.1 on 5/25  -Hypoglycemia protocol  -Hold metformin while admitted to the hospital  -Sliding scale insulin, continue Medium dose   -Continue gabapentin     Hypomagnesemia  A/p- replacement protocol; continue home magnesium tomorrow    Hypertension  A- pressures are soft. On diltizem for both htn and aflutter. On diuretic, continued.   P:  - medication readjustments as per cardiology, especially given recurrent tachycardia    Dizziness  A/p- stable but can continue PRN meclizine     Tobacco use  A/p- encouraged decreasing and eventual cessation; continue nicotine inhaler     fibromyalgia   chronic pain syndrome  Assessment: Currently pain stable.  She is on a regimen of oxycodone.   Plan:  -Continue for now but modified with a lower range available: 5-10mg q6 hrs prn (instead of scheduled 10)        Diet: Combination Diet Low Saturated Fat Na <2400mg Diet, No Caffeine Diet    DVT Prophylaxis: Pneumatic Compression Devices, Ambulate every shift and no pharmaceutical given worsening anemia   Chairez Catheter: Not present  Lines: None     Cardiac Monitoring: ACTIVE order. Indication: Acute decompensated heart failure (48 hours)  Code Status: Full Code      Clinically Significant Risk Factors         # Hypokalemia: Lowest K = 3.2 mmol/L in last 2 days, will replace as needed       # Hypoalbuminemia: Lowest albumin = 3 g/dL at 5/26/2023  1:07 PM, will monitor as appropriate     # Hypertension: Noted on problem list         # Moderate Malnutrition: based on nutrition assessment         Disposition Plan      Expected Discharge Date: 05/30/2023      Destination: home with family;home with help/services  Discharge Comments: [facility] gtt as in Afib with RVR even on po meds          Rayo Mendoza MD  Hospitalist Service  [facility]  Hospital  Securely message with [employer] (more info)  Text page via [facility] Paging/Directory   ______________________________________________________________________    Interval History   - no acute events  - pt is now on 2L NC but we discussed her heme positive study and recurrent tachycardia  - she is frustrated understandably but concurs with med changes  - we also revisited egd, which she previously declined-- given heme positive study and further discussion of sedation, she is now agreeable to consider egd and will speak with a GI consultant  - she has no new questions or concerns or symptoms otherwise     Physical Exam   Vital Signs: Temp: 99.8  F (37.7  C) Temp src: Oral BP: 115/68 Pulse: 91   Resp: 18 SpO2: 92 % O2 Device: Nasal cannula Oxygen Delivery: 2 LPM  Weight: 137 lbs 0 oz     General: alert, oriented, and in no acute distress  Pulmonary: relatively clear, on 2L NC, no rales or wheezes   CVS: irregularly irregular, no murmurs, rubs, or gallops; no blatant jugular venous distention; mild lower extremity edema with pitting edema in bilateral feet  GI: soft, nontender, BS+, no rebound or guarding, no conspicuous organomegaly   Neuro: nonfocal, moves all extremities of own volition  Psych: appropriate          Medical Decision Making       56 MINUTES SPENT BY ME on the date of service doing chart review, history, exam, documentation & further activities per the note.      Data   ------------------------- PAST 24 HR DATA REVIEWED -----------------------------------------------    I have personally reviewed the following data over the past 24 hrs:    N/A  \   N/A   / N/A     N/A N/A N/A /  95   4.5 N/A N/A \       Imaging results reviewed over the past 24 hrs:   No results found for this or any previous visit (from the past 24 hour(s)).

## 2023-05-29 NOTE — PROGRESS NOTES
CARDIOLOGY PROGRESS NOTE      Assessment/Plan:  1.  Atrial flutter (H)-atypical, asymptomatic, rate control strategy currently. Given the stability at this point not pursuing urgent cardioversion and can discuss attempt as an outpatient as she was not completely compliant with Eliquis.  2.  Aortic stenosis-severe with peak velocity of 4.3 m/s, mean gradient of 47 mmHg.  We will need to have angiography to evaluate for TAVR.  Had CTA Friday.  3.  Paroxysmal Atrial Fibrillation-status post pulmonary vein isolation ablation in 2012.  Anticoagulation with Eliquis.  4.  Tachycardia-difficult to control heart rate yesterday, back on IV diltiazem, switch back over to oral diltiazem and oral metoprolol.  If unable to control today we will add amiodarone although do not want to use it for long-term given her COPD and chance for pulmonary fibrosis.  5.  COPD exacerbation (H)-suspect this is her biggest issue, treatment per primary care team.  Needs to discontinue tobacco use.  6.  Heart failure- suspect mild in the setting of preserved ejection fraction of 60 to 65%.  Oxygen saturation 97% on 3 L, creatinine preserved.  Peripheral edema better, switch to oral furosemide.    7.  Type 2 diabetes mellitus with hypoglycemia (H)-so noted and hemoglobin A1c 6.1 and started Jardiance.  8.  Pleural effusion- given anemia hold off on thoracentesis.  Did have thoracentesis in January.  9.  Anemia- iron deficiency, positive heme stools, does have history of peptic ulcer disease, would recommend inpatient EGD, if source of bleeding not seen would then consider left atrial appendage occlusion device.    10.  Chronic pain syndrome-so noted and defer to primary care team, felt secondary to Fibromyalgia.    Plan:  1.  Switch to oral furosemide.  2.  Switch to oral metoprolol.  3.  Switch to oral diltiazem.  4.  Ambulate.  5.  If tachycardia upon ambulation add amiodarone.  6.  Defer to primary care team to consider EGD while  "inpatient.    Discharge Plannin.  Cardiac barrier to discharge would be resolution of rate control.  Anticipate tomorrow.  2.  Can follow with Dr. Bao Pizano primary cardiologist.  3.  Will need to be seen by TAVR clinic.     LOS: 5 days     Subjective:  Interval History:    72-year-old white female being seen on 6 day of hospitalization.  Admits to some mild bipedal edema.  Denies any chest pains, palpitations, significant shortness of breath, PND, orthopnea, syncope, dizziness.    Medications    apixaban ANTICOAGULANT  5 mg Oral BID     atorvastatin  20 mg Oral At Bedtime     digoxin  125 mcg Oral Daily     [Held by provider] diltiazem  30 mg Oral Q8H     empagliflozin  10 mg Oral Daily     fluticasone-vilanterol  1 puff Inhalation Daily     [Held by provider] furosemide  40 mg Oral BID     gabapentin  600 mg Oral TID     insulin aspart  1-7 Units Subcutaneous TID AC     insulin aspart  1-5 Units Subcutaneous At Bedtime     iron sucrose  300 mg Intravenous Q72H     magnesium oxide  400 mg Oral Daily     metoprolol  2.5 mg Intravenous Q8H     nicotine   Transdermal Q8H     pantoprazole  40 mg Oral Daily     sodium chloride (PF)  3 mL Intracatheter Q8H     sucralfate  1 g Oral 4x Daily AC & HS     Objective:   Vital signs in last 24 hours:  Temp:  [97.5  F (36.4  C)-99.8  F (37.7  C)] 99.8  F (37.7  C)  Pulse:  [] 92  Resp:  [16-18] 18  BP: (107-144)/(58-79) 115/62  SpO2:  [81 %-100 %] 92 %    Physical Exam:  /62   Pulse 92   Temp 99.8  F (37.7  C) (Oral)   Resp 18   Ht 1.676 m (5' 6\")   Wt 62.1 kg (137 lb)   SpO2 92%   BMI 22.11 kg/m      General Appearance:    Alert, cooperative, no distress, appears stated age   Head:    Normocephalic, without obvious abnormality, atraumatic   Throat:   Lips, mucosa, and tongue normal; teeth and gums normal   Neck:   Supple, symmetrical, trachea midline, no adenopathy;        thyroid:  No enlargement/tenderness/nodules; no carotid    bruit or JVD   Back: "     Symmetric, no curvature, ROM normal, no CVA tenderness   Lungs:     Clear to auscultation bilaterally, respirations unlabored   Chest wall:    No tenderness or deformity   Heart:    Irregularly irregular, S1 and S2 normal, 2/6 systolic ejection murmur, mid peaking, no rub   or gallop   Abdomen:     Soft, non-tender, bowel sounds active all four quadrants,     no masses, no organomegaly   Extremities:   Normal, atraumatic, no cyanosis, 1/4 bipedal edema   Pulses:   2+ and symmetric all extremities   Skin:   Skin color, texture, turgor normal, no rashes or lesions     Cardiographics:      ECG: Personally reviewed by myself shows atypical atrial flutter, leftward axis, nonspecific ST-T wave changes.    Echocardiogram:   Compared to the prior study dated 1/11/23, there are changes as noted. Aortic stenosis is now severe.  The left ventricle is normal in size. Left ventricular function is normal.The ejection fraction is 60-65%.  The right ventricle is mildly dilated.  The left atrium is mildly dilated.  Severe valvular aortic stenosis. There is mild to moderate (1-2+) aortic regurgitation.  Small pericardial effusion      Lab Results:   Recent Labs   Lab 05/28/23  0445   WBC 7.5   HGB 9.0*   HCT 31.5*        Recent Labs   Lab 05/29/23  0749      CO2 39*   BUN 25   .       Lab Results   Component Value Date    TROPONINI 0.07 05/24/2023

## 2023-05-29 NOTE — PLAN OF CARE
Goal Outcome Evaluation:      Problem: Plan of Care - These are the overarching goals to be used throughout the patient stay.    Goal: Optimal Comfort and Wellbeing  Outcome: Progressing     Problem: Dysrhythmia  Goal: Normalized Cardiac Rhythm  Outcome: Progressing     Problem: Gas Exchange Impaired  Goal: Optimal Gas Exchange  Outcome: Progressing  Patient is up with a standby assist to the bedside commode. Tolerating activity fairly well at this time. Voiding. GI consult. Patient was restarted on po lasix, metoprolol, and dilt today. Plan is to turn the dilt gtt 4 hours after po dilt given. Continues to have BLE edema. Call light placed within reach and purposeful rounding performed. Bridgett Covington RN

## 2023-05-29 NOTE — PROGRESS NOTES
"[facility] Hematology and Oncology Inpatient Progress Note    Patient: Mary Kay Tejada  MRN: [id number]  Date of Service: May 28, 2023         Reason for Visit    #.  Severe microcytic anemia    Assessment and Plan     Cancer Staging   No matching staging information was found for the patient.    #.  Chronic iron deficiency anemia suspected occult blood loss  #.  On long-term anticoagulant use  #.  Reported peptic ulcer disease.  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  Records need to be verified.     Post 1 unit of packed RBC transfusion (5/26/2023) and IV Venofer 300 mg (5/28/2023).   She is now agreed to proceed with EGD since it will impact on management decision for her cardiac procedure.   I will plan to give second dose of IV Venofer in the next couple days either as inpatient or outpatient.    ______________________________________________________________________________    History of Present Illness    Overnight events and notes were reviewed.  She is doing well.  She had IV iron yesterday and no intolerable side effects.      Review of Systems    Apart from describing in HPI, the remainder of comprehensive ROS was negative.    Physical Exam    /55 (BP Location: Right arm, Patient Position: Chair, Cuff Size: Adult Regular)   Pulse 91   Temp 98.6  F (37  C) (Oral)   Resp 18   Ht 1.676 m (5' 6\")   Wt 62.1 kg (137 lb)   SpO2 95%   BMI 22.11 kg/m      General: alert, awake, not in acute distress  HEENT: Head: Normal, normocephalic, atraumatic.  Eye: Normal external eye, conjunctiva, lids cornea, TIM.  Pharynx: Normal buccal mucosa. Normal pharynx.  Neck / Thyroid: Supple, no masses, nodes, nodules or enlargement.  Lymphatics: No abnormally enlarged lymph nodes.  Abdomen: abdomen is soft without significant tenderness, masses, organomegaly or guarding  Extremities: normal strength, tone, and muscle mass  Skin: normal. no rash or abnormalities  CNS: non focal.     Lab Results    Recent Results (from the " past 24 hour(s))   Glucose by meter    Collection Time: 23  4:28 PM   Result Value Ref Range    GLUCOSE BY METER POCT 366 (H) 70 - 99 mg/dL   Glucose by meter    Collection Time: 23  9:28 PM   Result Value Ref Range    GLUCOSE BY METER POCT 175 (H) 70 - 99 mg/dL   Glucose by meter    Collection Time: 23  6:51 AM   Result Value Ref Range    GLUCOSE BY METER POCT 95 70 - 99 mg/dL   Extra Purple Top Tube    Collection Time: 23  7:45 AM   Result Value Ref Range    Hold Specimen JIC    Magnesium    Collection Time: 23  7:49 AM   Result Value Ref Range    Magnesium 1.9 1.8 - 2.6 mg/dL   Basic metabolic panel    Collection Time: 23  7:49 AM   Result Value Ref Range    Sodium 142 136 - 145 mmol/L    Potassium 3.8 3.5 - 5.0 mmol/L    Chloride 97 (L) 98 - 107 mmol/L    Carbon Dioxide (CO2) 39 (H) 22 - 31 mmol/L    Anion Gap 6 5 - 18 mmol/L    Urea Nitrogen 25 8 - 28 mg/dL    Creatinine 0.70 0.60 - 1.10 mg/dL    Calcium 10.4 8.5 - 10.5 mg/dL    Glucose 116 70 - 125 mg/dL    GFR Estimate >90 >60 mL/min/1.73m2   Glucose by meter    Collection Time: 23 12:04 PM   Result Value Ref Range    GLUCOSE BY METER POCT 105 (H) 70 - 99 mg/dL        Imaging    Echocardiogram Complete    Result Date: 2023  [id number]^[id number] [address]  Name: ALVAREZ HINDS MRN: [id number] : 1950 Study Date: 2023 01:44 PM Age: 73 yrs Gender: Female Patient Location: [facility] Reason For Study: Aflutter Ordering Physician: SYDNIE COLLADO Performed By: LING  BSA: 1.7 m2 Height: 65.5 in Weight: 143 lb HR: 82 BP: 114/62 mmHg ______________________________________________________________________________ Procedure Compared to the prior study dated 23, there are changes as noted. ______________________________________________________________________________ Interpretation Summary  Compared to the prior study dated 23,  there are changes as noted. Aortic stenosis is now severe. The left ventricle is normal in size. Left ventricular function is normal.The ejection fraction is 60-65%. The right ventricle is mildly dilated. The left atrium is mildly dilated. Severe valvular aortic stenosis. There is mild to moderate (1-2+) aortic regurgitation. Small pericardial effusion  ______________________________________________________________________________ Left Ventricle The left ventricle is normal in size. Left ventricular function is normal.The ejection fraction is 60-65%. There is normal left ventricular wall thickness. Left ventricular diastolic function is abnormal. Diastolic Doppler findings (E/E' ratio and/or other parameters) suggest left ventricular filling pressures are increased. No regional wall motion abnormalities noted.  Right Ventricle The right ventricle is mildly dilated. The right ventricular systolic function is normal.  Atria The left atrium is mildly dilated. Right atrial size is normal. There is no color Doppler evidence of an atrial shunt.  Mitral Valve There is mild mitral annular calcification. Mitral valve leaflets appear normal. There is mild (1+) mitral regurgitation.  Tricuspid Valve The tricuspid valve is not well visualized, but is grossly normal. No tricuspid regurgitation.  Aortic Valve There is mild to moderate (1-2+) aortic regurgitation. Severe valvular aortic stenosis. Mean gradient of 47 mmHg, Peak velocity of 4.3 m/s, AV cuca DI 0.25.  Pulmonic Valve The pulmonic valve is not well visualized. This degree of valvular regurgitation is within normal limits.  Vessels The aorta root is normal. Normal size ascending aorta. IVC diameter <2.1 cm collapsing >50% with sniff suggests a normal RA pressure of 3 mmHg.  Pericardium Small pericardial effusion.  ______________________________________________________________________________ MMode/2D Measurements & Calculations RVDd: 4.0 cm IVSd: 0.67 cm LVIDd: 5.3 cm  LVIDs: 3.5 cm LVPWd: 1.1 cm FS: 32.9 %  LV mass(C)d: 170.1 grams LV mass(C)dI: 98.2 grams/m2 Ao root diam: 2.6 cm LA dimension: 4.9 cm asc Aorta Diam: 2.7 cm LA/Ao: 1.9 LVOT diam: 2.1 cm LVOT area: 3.3 cm2 LA Volume (BP): 78.5 ml LA Volume Index (BP): 45.4 ml/m2  LA Volume Indexed (AL/bp): 52.5 ml/m2 RWT: 0.42  Doppler Measurements & Calculations MV E max raji: 181.0 cm/sec MV A max raji: 54.0 cm/sec MV E/A: 3.4 MV max P.8 mmHg MV mean P.5 mmHg MV V2 VTI: 50.3 cm MVA(VTI): 1.6 cm2 MV P1/2t max raji: 236.2 cm/sec MV P1/2t: 54.1 msec MVA(P1/2t): 4.1 cm2 MV dec slope: 1279 cm/sec2 MV dec time: 0.13 sec Ao V2 max: 431.0 cm/sec Ao max P.0 mmHg Ao V2 mean: 325.6 cm/sec Ao mean P.8 mmHg Ao V2 VTI: 92.5 cm ADALBERTO(I,D): 0.89 cm2 ADALBERTO(V,D): 0.82 cm2 LV V1 max P.5 mmHg LV V1 max: 106.4 cm/sec LV V1 VTI: 24.8 cm SV(LVOT): 81.9 ml SI(LVOT): 47.3 ml/m2 PA V2 max: 124.9 cm/sec PA max P.2 mmHg PA mean PG: 3.5 mmHg PA V2 VTI: 25.1 cm AV Raji Ratio (DI): 0.25 ADALBERTO Index (cm2/m2): 0.51  E/E': 26.0 E/E' av.4 Lateral E/e': 24.8 Medial E/e': 26.0 Peak E' Raji: 7.0 cm/sec  ______________________________________________________________________________ Report approved by: Diamante Rubio 2023 03:53 PM          Signed by: Chandra Major MD

## 2023-05-30 LAB
ATRIAL RATE - MUSE: 364 BPM
DIASTOLIC BLOOD PRESSURE - MUSE: NORMAL MMHG
ERYTHROCYTE [DISTWIDTH] IN BLOOD BY AUTOMATED COUNT: 23.9 % (ref 10–15)
GLUCOSE BLDC GLUCOMTR-MCNC: 113 MG/DL (ref 70–99)
GLUCOSE BLDC GLUCOMTR-MCNC: 118 MG/DL (ref 70–99)
GLUCOSE BLDC GLUCOMTR-MCNC: 143 MG/DL (ref 70–99)
GLUCOSE BLDC GLUCOMTR-MCNC: 183 MG/DL (ref 70–99)
HAPTOGLOB SERPL-MCNC: 230 MG/DL (ref 32–197)
HCT VFR BLD AUTO: 33.9 % (ref 35–47)
HGB BLD-MCNC: 9.3 G/DL (ref 11.7–15.7)
INTERPRETATION ECG - MUSE: NORMAL
MAGNESIUM SERPL-MCNC: 1.7 MG/DL (ref 1.8–2.6)
MCH RBC QN AUTO: 21.7 PG (ref 26.5–33)
MCHC RBC AUTO-ENTMCNC: 27.4 G/DL (ref 31.5–36.5)
MCV RBC AUTO: 79 FL (ref 78–100)
P AXIS - MUSE: 82 DEGREES
PLATELET # BLD AUTO: 262 10E3/UL (ref 150–450)
POTASSIUM BLD-SCNC: 3.6 MMOL/L (ref 3.5–5)
PR INTERVAL - MUSE: NORMAL MS
QRS DURATION - MUSE: 112 MS
QT - MUSE: 404 MS
QTC - MUSE: 496 MS
R AXIS - MUSE: -65 DEGREES
RBC # BLD AUTO: 4.28 10E6/UL (ref 3.8–5.2)
SYSTOLIC BLOOD PRESSURE - MUSE: NORMAL MMHG
T AXIS - MUSE: 83 DEGREES
VENTRICULAR RATE- MUSE: 91 BPM
WBC # BLD AUTO: 7.8 10E3/UL (ref 4–11)

## 2023-05-30 PROCEDURE — 85027 COMPLETE CBC AUTOMATED: CPT | Performed by: STUDENT IN AN ORGANIZED HEALTH CARE EDUCATION/TRAINING PROGRAM

## 2023-05-30 PROCEDURE — 210N000002 HC R&B HEART CARE

## 2023-05-30 PROCEDURE — 99232 SBSQ HOSP IP/OBS MODERATE 35: CPT | Performed by: INTERNAL MEDICINE

## 2023-05-30 PROCEDURE — 36415 COLL VENOUS BLD VENIPUNCTURE: CPT | Performed by: STUDENT IN AN ORGANIZED HEALTH CARE EDUCATION/TRAINING PROGRAM

## 2023-05-30 PROCEDURE — 93005 ELECTROCARDIOGRAM TRACING: CPT | Performed by: INTERNAL MEDICINE

## 2023-05-30 PROCEDURE — 93005 ELECTROCARDIOGRAM TRACING: CPT

## 2023-05-30 PROCEDURE — 250N000011 HC RX IP 250 OP 636: Performed by: STUDENT IN AN ORGANIZED HEALTH CARE EDUCATION/TRAINING PROGRAM

## 2023-05-30 PROCEDURE — 93010 ELECTROCARDIOGRAM REPORT: CPT | Performed by: INTERNAL MEDICINE

## 2023-05-30 PROCEDURE — 83735 ASSAY OF MAGNESIUM: CPT | Performed by: STUDENT IN AN ORGANIZED HEALTH CARE EDUCATION/TRAINING PROGRAM

## 2023-05-30 PROCEDURE — 84132 ASSAY OF SERUM POTASSIUM: CPT | Performed by: STUDENT IN AN ORGANIZED HEALTH CARE EDUCATION/TRAINING PROGRAM

## 2023-05-30 PROCEDURE — 250N000013 HC RX MED GY IP 250 OP 250 PS 637: Performed by: INTERNAL MEDICINE

## 2023-05-30 PROCEDURE — 99233 SBSQ HOSP IP/OBS HIGH 50: CPT | Performed by: STUDENT IN AN ORGANIZED HEALTH CARE EDUCATION/TRAINING PROGRAM

## 2023-05-30 PROCEDURE — 250N000013 HC RX MED GY IP 250 OP 250 PS 637: Performed by: STUDENT IN AN ORGANIZED HEALTH CARE EDUCATION/TRAINING PROGRAM

## 2023-05-30 RX ORDER — DILTIAZEM HYDROCHLORIDE 180 MG/1
180 CAPSULE, COATED, EXTENDED RELEASE ORAL DAILY
Status: DISCONTINUED | OUTPATIENT
Start: 2023-05-30 | End: 2023-06-04

## 2023-05-30 RX ADMIN — Medication 1 MG: at 00:44

## 2023-05-30 RX ADMIN — Medication 400 MG: at 08:04

## 2023-05-30 RX ADMIN — FLUTICASONE FUROATE AND VILANTEROL TRIFENATATE 1 PUFF: 200; 25 POWDER RESPIRATORY (INHALATION) at 08:06

## 2023-05-30 RX ADMIN — SUCRALFATE 1 G: 1 TABLET ORAL at 21:02

## 2023-05-30 RX ADMIN — APIXABAN 5 MG: 5 TABLET, FILM COATED ORAL at 20:58

## 2023-05-30 RX ADMIN — GABAPENTIN 600 MG: 600 TABLET, FILM COATED ORAL at 08:03

## 2023-05-30 RX ADMIN — METOPROLOL TARTRATE 12.5 MG: 25 TABLET, FILM COATED ORAL at 20:58

## 2023-05-30 RX ADMIN — DILTIAZEM HYDROCHLORIDE 30 MG: 30 TABLET, FILM COATED ORAL at 00:03

## 2023-05-30 RX ADMIN — SUCRALFATE 1 G: 1 TABLET ORAL at 06:52

## 2023-05-30 RX ADMIN — GABAPENTIN 600 MG: 600 TABLET, FILM COATED ORAL at 20:58

## 2023-05-30 RX ADMIN — FUROSEMIDE 40 MG: 40 TABLET ORAL at 16:28

## 2023-05-30 RX ADMIN — DIGOXIN 125 MCG: 125 TABLET ORAL at 08:03

## 2023-05-30 RX ADMIN — SUCRALFATE 1 G: 1 TABLET ORAL at 16:28

## 2023-05-30 RX ADMIN — ONDANSETRON 4 MG: 2 INJECTION INTRAMUSCULAR; INTRAVENOUS at 08:11

## 2023-05-30 RX ADMIN — SUCRALFATE 1 G: 1 TABLET ORAL at 11:53

## 2023-05-30 RX ADMIN — ATORVASTATIN CALCIUM 20 MG: 10 TABLET, FILM COATED ORAL at 21:02

## 2023-05-30 RX ADMIN — DILTIAZEM HYDROCHLORIDE 180 MG: 180 CAPSULE, EXTENDED RELEASE ORAL at 11:53

## 2023-05-30 RX ADMIN — PANTOPRAZOLE SODIUM 40 MG: 40 TABLET, DELAYED RELEASE ORAL at 08:04

## 2023-05-30 RX ADMIN — GABAPENTIN 600 MG: 600 TABLET, FILM COATED ORAL at 16:28

## 2023-05-30 RX ADMIN — DILTIAZEM HYDROCHLORIDE 30 MG: 30 TABLET, FILM COATED ORAL at 08:03

## 2023-05-30 RX ADMIN — EMPAGLIFLOZIN 10 MG: 10 TABLET, FILM COATED ORAL at 08:04

## 2023-05-30 RX ADMIN — APIXABAN 5 MG: 5 TABLET, FILM COATED ORAL at 08:03

## 2023-05-30 RX ADMIN — PROCHLORPERAZINE EDISYLATE 5 MG: 5 INJECTION INTRAMUSCULAR; INTRAVENOUS at 11:52

## 2023-05-30 RX ADMIN — FUROSEMIDE 40 MG: 40 TABLET ORAL at 08:03

## 2023-05-30 RX ADMIN — METOPROLOL TARTRATE 12.5 MG: 25 TABLET, FILM COATED ORAL at 08:04

## 2023-05-30 ASSESSMENT — ACTIVITIES OF DAILY LIVING (ADL)
ADLS_ACUITY_SCORE: 37
ADLS_ACUITY_SCORE: 35
ADLS_ACUITY_SCORE: 37
ADLS_ACUITY_SCORE: 35

## 2023-05-30 NOTE — PROGRESS NOTES
"GI PROGRESS NOTE  5/30/2023  Mary Kay Tejada  1950  /-64    Subjective:   Tired today. Had some nausea after taking pills this AM, improved with supportive cares. Tolerated low fat breakfast. No new complaints.      Objective:     Blood pressure 101/59, pulse 88, temperature 97.6  F (36.4  C), temperature source Oral, resp. rate 20, height 1.676 m (5' 6\"), weight 60.1 kg (132 lb 7.9 oz), SpO2 90 %, not currently breastfeeding.    Body mass index is 21.39 kg/m .  Gen: NAD, sleepy, sitting up in chair   Cardio: RRR  GI: Non-distended, BS positive, soft, non-tender  Neuro: Alert and oriented  Skin: No jaundice     Laboratory:  BMP  Recent Labs   Lab 05/30/23  0651 05/30/23  0536 05/29/23 2002 05/29/23  1632 05/29/23  1204 05/29/23  0749 05/28/23  1628 05/28/23  1302 05/28/23  0736 05/28/23  0445 05/25/23  0737 05/25/23  0507   NA  --   --   --   --   --  142  --   --   --  145  --  136   POTASSIUM  --  3.6  --   --   --  3.8  --  4.5  --  3.2*  --  4.0   CHLORIDE  --   --   --   --   --  97*  --   --   --  98  --  99   JOEY  --   --   --   --   --  10.4  --   --   --  9.0  --  9.1   CO2  --   --   --   --   --  39*  --   --   --  40*  --  29   BUN  --   --   --   --   --  25  --   --   --  25  --  31*   CR  --   --   --   --   --  0.70  --   --   --  0.63  --  0.86   *  --  160* 223*   < > 116   < >  --    < > 99   < > 189*    < > = values in this interval not displayed.     CBC  Recent Labs   Lab 05/30/23  0536 05/28/23  0445 05/27/23  1809 05/26/23  1601 05/26/23  1152   WBC 7.8 7.5  --   --  10.8   RBC 4.28 4.16  --   --  4.07   HGB 9.3* 9.0* 9.3*   < > 8.7*   HCT 33.9* 31.5*  --   --  30.2*   MCV 79 76*  --   --  74*   MCH 21.7* 21.6*  --   --  21.4*   MCHC 27.4* 28.6*  --   --  28.8*   RDW 23.9* 22.9*  --   --  22.9*    283  --   --  341    < > = values in this interval not displayed.     INRNo lab results found in last 7 days.   LFTs  Recent Labs   Lab Test 05/26/23  1307 " 05/24/23  1015 02/08/23  1334 01/15/23  0542 06/03/19  1019 12/13/18  1043   ALBUMIN 3.0* 3.1*  --  2.9*   < > 3.8   BILITOTAL 0.9 1.3*  --  0.6   < > 0.6   ALT 24 35  --  26   < > 16   AST 21 34  --  13   < > 18   PROTEIN  --   --  Negative  --   --   --    LIPASE  --  11  --   --   --  18    < > = values in this interval not displayed.        Assessment:   1. Chronic microcytic anemia  72 yo female with history of microcytic anemia dating back to 2018. She had colonoscopy in 2019 with tubular adenomas. Suspect anemia due to slow chronic blood loss, consider AVMs from the small bowel. She reports history of PUD, but no records of this in our chart dating back to 2008. We offered EGD +/- colonoscopy, but she has declined and prefers to treat supportively with iron infusions (managed by hematology). She tells me today that if her anemia doesn't improve with iron infusions, then she may be agreeable to EGD.      Plan:   1. Blood transfusions, iron infusions per hematology  2. Patient has declined EGD/colonscopy   3. We will sign off. Please call with questions.                                                                          Darline Oates PA-C  Thank you for the opportunity to participate in the care of this patient.   Please feel free to call me with any questions or concerns.  Phone number [phone].

## 2023-05-30 NOTE — PROGRESS NOTES
[facility]    Medicine Progress Note - Hospitalist Service    Date of Admission:  5/24/2023    Assessment & Plan   SUMMARY: Mary Kay Tejada is a 73 year old female admitted on 5/24/2023. She has a past medical history of paroxysmal atrial flutter on apixaban, COPD, hyperlipidemia, diabetes mellitus type 2 with neuropathy, reflux, dizziness, tobacco use, fibromyalgia and chronic pain syndrome, who presents with shortness of breath and elevated heart rate in the 170s range.    On admission was tachycardic 170, otherwise stable vitals.  BMP nonremarkable, magnesium low 1.5, , CXR shows small to moderate right pleural effusion. Adjacent opacity in the basal right chest consistent with multisegment lower/middle lobe atelectasis, similar to February 2023. There is minimal pleural fluid in the left posterior costophrenic sulcus; lactate 1.3, procalcitonin negative, negative troponin, negative for DVT on US; EKG shows atrial flutter. Patient was given extra diltiazem; admitted and cardiology consulted. Given steroid burst (only 3 days initially) and doxycycline for anti-inflammatory (noted long QTc so avoiding azithromycin). Also hgb noted at 7.9 but no symptoms though prior in February was 9.2. On telemetry.  Following the hemoglobin for anemia serially. Admission diagnoses: atrial flutter, copd exacerbation, and anemia.       Aflutter, aortic stensosis, volume status. Recurrent tachycardia-  Was rate controlled and transitioned to po digoxin 5/25. Cardiology involved TAVR team in anticipation of future intervention. Jardiance added for both DM2 and heart health. Iv diuresis for 5/27 and 5/28 and now 5/29/2023 back to Oral lasix. However on 5/28, the pt had recurrent tachycardia with heart rates up to 180 and transitioned back to cardizem drip and also metoprolol 5/29/2023. Plan initially was for possibly DELROY cardioversion 5/30, then subsequently readjusted -current plan is transition to  "the following regimen: Digoxin 125 mcg daily, switched short-acting diltiazem to Cardizem  mg once daily, and also concomitant metoprolol titrate 12.5 mg twice daily and ongoing apixaban.       COPD exacerbation-  Increased steroid burst to 40mg po every day 5/25; weaned from 7L 5/25 to baseline now. Received anti-inflammatory antibiotic as well. Duonebs. Prior to discharge would do formal ambulation trial to determine if may require exertional supplemental 02.      Acute anemia-  serially following hgb w/ downward trend and reports of darker colored but non-melanotic stools; subsequently with a critical low hgb at 6.9 on 5/26/2023 early AM, received transfusion. Hemolysis labs ordered 5/26. On 5/27/2023 consulted hematology for the unexplained anemia and peripheral smear demonstrating \"occasional teardrop forms and rare red cell fragments.\" Felt most likely iron deficiency, confirmed w/ labs; started on IV iron 5/29/2023. Pt initially declined, then agreed to meet with GI, however appears she later decided to decline any procedures; at this point should have follow up w/ GI in the future. To receive 2nd dose of venofer soon (per heme/onc, possibly 5/30/2023 or 5/31).     Disposition: possibly tomorrow 5/31 if no recurrent tachycardia. May need 02 on discharge w/ ambulation.  - - - - -     Hx of heart failure with preserved ejection fraction  Volume overload  A- small-moderate right sided effusion and minimal of left, otherwise does not appear overtly volume up; had thoracentesis in January. BNP was not overtly elevated on admit at 176 vs previously 867 in February. No rales or extremity edema. Cardiology gave iv diuresis 5/27 and 5/28 and then back to po 5/29.   P:  - diuresis defer to Cardiology   - currently IV 5/27 and 5 days    - resume pta po regimen upon discharge   - I/Os, weights  - BNP was 176 vs 867 in February  - given anemia, concern for bleed, hold off on a thoracentesis at this time    Anemia, " "microcytic   Hx of gastric ulcer  Assessment: The delta was concerning with hemoglobin at 7.9 though earlier was 9.2 in February. She does have a prior history of prior ulcerations though. with a critical low hgb at 6.9 on 5/26/2023 early AM, received transfusion. Also peripheral smear and hemolysis labs ordered. Smear showed \"teardrop forms and rare red cell fragments;\" hematology consulted and felt to be consistent w/ PAULINA and started on iv iron 5/28. With positive heme study, revisited egd w/ patient 5/29/2023 -- she previously declined but now is open to it-- as such, asked GI to see. However she later changed her mind again and now plan for outpatient follow-up and ongoing iv iron.  Plan:  - Consult to hematology, appreciate    - appreciate IV iron therapy - 2nd dose 5/30/2023 or 5/31  -Serially follow hemoglobin   - Consult GI 5/29/2023 appreciated  - see below regarding continuing reflux meds   - transfuse if hgb < 7    - consent obtained for pRBC transfusions on 5/25     Reflux  A/P-see issue above as well, continue PPI Carafate    Atrial flutter   Shortness of breath  Long QTc  Assessment: see summary  Plan:   - consult to cardiology, appreciate   -as per cardiology; may need cardioversion--> now readjusted regimen 5/30/2023    - if no recurrent issues, likely can discharge 5/31 on finalized regimen as directly above in summary  - telemetry  - monitor clinically  - continue apixaban   - avoid QTc-prolonging medications       COPD exacerbation  Acute on Chronic Respiratory Failure with Hypoxia  Assessment: Her 02 needs are now back at baseline 5/29/2023 (now at 2L) after steroid burst and macrolide.  Plan:   - initial steroids: low dose 20mg daily   - on 5/25/2023 increased to 40mg po daily -> completed course of steroid burst  - doxycycline 100mg po bid, x3 days in total (completed 5/26)  - continue home medications  - duonebs, adjust as per RT  - 02 and weaned back baseline of 2.5L NC  - consider ambulation " trial prior to discharge  -Lasix as per cardiology     Hyperlipidemia  A/P-continue statin    Diabetes mellitus type 2 with neuropathy  Assessment: On metformin, has neuropathy. Sugars above goal, increased scale 5/25; now stable.  Plan:  - add on Hgb A1c resulted at 6.1 on 5/25  -Hypoglycemia protocol  -Hold metformin while admitted to the hospital  -Sliding scale insulin, continue Medium dose   -Continue gabapentin     Hypomagnesemia  A/p- replacement protocol; continue home magnesium on discharge    Hypertension  A- pressures have been soft. On diltizem for both htn and aflutter. On diuretic, continued.   P:  - medication readjustments as per cardiology, especially given recurrent tachycardia    Dizziness  A/p- stable but can continue PRN meclizine     Tobacco use  A/p- encouraged decreasing and eventual cessation; continue nicotine inhaler     fibromyalgia   chronic pain syndrome  Assessment: Currently pain stable.  She is on a regimen of oxycodone.   Plan:  -Continue for now but modified with a lower range available: 5-10mg q6 hrs prn (instead of scheduled 10)        Diet: Combination Diet Low Saturated Fat Na <2400mg Diet, No Caffeine Diet    DVT Prophylaxis: Pneumatic Compression Devices, Ambulate every shift and no pharmaceutical given worsening anemia   Chairez Catheter: Not present  Lines: None     Cardiac Monitoring: ACTIVE order. Indication: Tachyarrhythmias, acute (48 hours)  Code Status: Full Code      Clinically Significant Risk Factors            # Hypercalcemia: Highest Ca = 10.4 mg/dL in last 2 days, will monitor as appropriate    # Hypoalbuminemia: Lowest albumin = 3 g/dL at 5/26/2023  1:07 PM, will monitor as appropriate     # Hypertension: Noted on problem list         # Moderate Malnutrition: based on nutrition assessment         Disposition Plan      Expected Discharge Date: 05/31/2023      Destination: home with family;home with help/services  Discharge Comments: PO Dilt and metoprolol,  possible d/c 5/30 ?cardioversion          Rayo Mendoza MD  Hospitalist Service  [facility]  Securely message with Qwite (more info)  Text page via MODLOFT Paging/Directory   ______________________________________________________________________    Interval History   - no acute events  - pt remains on 2L NC; she knows likely will do ambulation trial and may still need extra 02 pending that  - she is on board for iron therapy part 2  - she affirmed decision to not pursue an egd  - we discussed cardiology input pending this AM; she is aware of possibility for cardioversion  - no further questions or symptoms or concerns  - later updated on revised medication regimen for her heart rate and rhythm    Physical Exam   Vital Signs: Temp: 97.8  F (36.6  C) Temp src: Oral BP: 104/58 Pulse: 88   Resp: 20 SpO2: 90 % O2 Device: Nasal cannula Oxygen Delivery: 1.5 LPM  Weight: 132 lbs 7.94 oz     General: alert, oriented, and in no acute distress  Pulmonary: relatively clear, on 2L NC, no rales or wheezes   CVS: irregularly irregular, no murmurs, rubs, or gallops; no blatant jugular venous distention; mild lower extremity edema with pitting edema in bilateral feet  GI: soft, nontender, BS+, no rebound or guarding, no conspicuous organomegaly   Neuro: nonfocal, moves all extremities of own volition  Psych: appropriate          Medical Decision Making       45 MINUTES SPENT BY ME on the date of service doing chart review, history, exam, documentation & further activities per the note.      Data   ------------------------- PAST 24 HR DATA REVIEWED -----------------------------------------------    I have personally reviewed the following data over the past 24 hrs:    7.8  \   9.3 (L)   / 262     N/A N/A N/A /  118 (H)   3.6 N/A N/A \       Imaging results reviewed over the past 24 hrs:   No results found for this or any previous visit (from the past 24 hour(s)).

## 2023-05-30 NOTE — PLAN OF CARE
Goal Outcome Evaluation:    Patient VSS, 2L NC, A&O. Denies pain, rested with eyes closed. Dilt gtt stopped @2000 4hrs after oral dilt admin per order, HR maintained <100 in conjunction with oral metoprolol, Aflutter on tele. Up SBA to bedside commode. No significant occurences overnight.    Problem: Gas Exchange Impaired  Goal: Optimal Gas Exchange  Intervention: Optimize Oxygenation and Ventilation  Recent Flowsheet Documentation  Taken 5/30/2023 0000 by Parag Luna, RN  Head of Bed (HOB) Positioning: HOB at 20-30 degrees  Taken 5/29/2023 2000 by Parag Luna, RN  Head of Bed (HOB) Positioning: HOB at 20-30 degrees     Problem: Dysrhythmia  Goal: Normalized Cardiac Rhythm  Outcome: Progressing

## 2023-05-30 NOTE — PROGRESS NOTES
[facility] HEART [facility]   [address], [place] [address]   [email or web address]   OFFICE: [phone]            Impression and Plan     1.  Atrial flutter/fibrillation.  Mary Kay did have intent atrial fibrillation ablation/PVI in 2012.  Presently, rate control strategy being pursued.  Suspect on telemetry Mary Kay remains in atrial flutter though cannot entirely discount possible sinus rhythm.  Continue apixaban.    Continue digoxin 125 mcg daily.  Continue diltiazem, but convert from short acting formulation to Cardizem  mg daily.  Continue metoprolol tartrate 12.5 mg twice daily.  We will obtain twelve-lead ECG this morning to confirm if Cecilia is indeed still in atrial flutter as SPECT.    2.  Aortic stenosis.  Cecilia has known severe aortic stenosis.  Echocardiogram 25 May 2023 revealed a mean gradient of 47 mmHg with peak velocity of 4.3 m/s.  Calculated valve area 0.82-0.89 cm .  Dimensionless index is 0.25 (see cardiac diagnostic section below).  Mary Kay has already been referred to the Valve Clinic for consideration of TAVR.    3.  Coronary artery disease.  Cecilia has coronary artery disease by virtue of CT scan earlier this year on 10 January 2023 revealing the presence of coronary calcification (not quantified).    4.  Dyslipidemia.  Lipid profile 25 July 2022 revealed LDL 53 mg/dL and HDL 84 mg/dL 2.    5.  COPD exacerbation.  Management plan as outlined by Hospital Service.    6.  Anemia.  Management plan as outlined by the providers.  Appreciate Hematology/Oncology & Gastroenterology  Input.    If ventricular response reasonable, suspect Mary Kay could dismiss from hospital.  Notes in Epic indicate referral has already been made to the Valve Clinic by Dr. Savanna Loya with follow-up note by Jennifer Perales confirming.  We will also make arrangements for Mary Kay follow-up with Dr. Surendra Barry (Demar Pizano in approximately 6  "weeks (request already submitted in Epic).  Lastly, we will also refer to the Atrial Fibrillation clinic for follow-up of her atrial arrhythmias.      Primary Cardiologist: Dr. Surendra Barry ([facility]) Jericadarrell Medina     Mary Kay minimizes any subjective palpitations despite atrial arrhythmia.  Denies chest pain.  Breathing relatively comfortable      Cardiac Diagnostics   Telemetry (personally reviewed): Suspected atrial flutter.    Echocardiogram 25 May 2023:  Normal left ventricular size and systolic performance with ejection fraction of 60 to 65%.  Severe aortic stenosis.  The mean gradient is measured at 47 mmHg with peak velocity of 4.3 m/s.  Calculated valve area 0.82-0.89 cm .  Dimensionless index is 0.25.  Mild-moderate aortic insufficiency.  Mild right ventricular enlargement with normal right ventricular systolic performance.  Mild left atrial enlargement.  Right atrium of normal dimension.    Twelve-lead ECG (personally reviewed 24 May 2023: Atrial fibrillation/flutter with heart rate of 141 bpm.  Left anterior fascicular block.  Nonspecific T wave changes    Physical Examination       /59 (BP Location: Right arm, Cuff Size: Adult Small)   Pulse 88   Temp 97.6  F (36.4  C) (Oral)   Resp 20   Ht 1.676 m (5' 6\")   Wt 60.1 kg (132 lb 7.9 oz)   SpO2 90%   BMI 21.39 kg/m          Vitals:    05/26/23 0457 05/27/23 0411 05/28/23 0523 05/29/23 0551   Weight: 64.4 kg (142 lb) 63.8 kg (140 lb 9.6 oz) 62.8 kg (138 lb 8 oz) 62.1 kg (137 lb)    05/30/23 0400   Weight: 60.1 kg (132 lb 7.9 oz)              Intake/Output Summary (Last 24 hours) at 5/30/2023 0631  Last data filed at 5/30/2023 0441  Gross per 24 hour   Intake 1620 ml   Output 1200 ml   Net 420 ml     The patient is alert and oriented times three. Sclerae are anicteric. Mucosal membranes are moist. Jugular venous pressure is reasonable no significant adenopathy/thyromegally appreciated. Lungs are are noticeably diminished bilaterally. " "On cardiovascular exam, the patient has a fairly regular S1 and S2.  SM.  Abdomen is soft and non-tender. Extremities reveal no clubbing, cyanosis.         Imaging     Chest radiograph 24 May 2023:  There is a small to moderate right pleural effusion. Adjacent opacity in the basal right chest consistent with multisegment lower/middle lobe atelectasis, similar to February 2023. There is minimal pleural fluid in the left posterior costophrenic sulcus.  No new, superimposed interstitial or alveolar lung opacities.  The lower right heart border remains silhouetted. No change in mildly enlarged cardiac silhouette. Vascular pedicle width is normal.  Degenerative osteophytes of the thoracic spine and right glenohumeral osteoarthrosis is again noted.    CT scan of chest 10 January 2023:  Interstitial thickening throughout both lungs, likely relating to interstitial pulmonary edema. There are also a small to moderate-sized right pleural effusion and small left pleural effusion.   A small region of patchy opacities in the right middle lobe, likely representing pneumonia.  Mild to moderate emphysematous changes in the lungs.  Coronary calcification: \"Present\".      Lab Results     Recent Labs   Lab 05/30/23  0536 05/29/23 2002 05/29/23  1632 05/29/23  1204 05/29/23  0749 05/28/23  1628 05/28/23  1302 05/28/23  0736 05/28/23  0445 05/27/23  2144 05/27/23  1809 05/26/23  1334 05/26/23  1307 05/26/23  1152 05/25/23  0737 05/25/23  0507 05/24/23  1749 05/24/23  1015   WBC 7.8  --   --   --   --   --   --   --  7.5  --   --   --   --  10.8  --  7.6  --  8.5   HGB 9.3*  --   --   --   --   --   --   --  9.0*  --  9.3*   < >  --  8.7*   < > 7.3*   < > 7.9*   MCV 79  --   --   --   --   --   --   --  76*  --   --   --   --  74*  --  72*  --  72*     --   --   --   --   --   --   --  283  --   --   --   --  341  --  357  --  438   NA  --   --   --   --  142  --   --   --  145  --   --   --   --   --   --  136  --  140 "   POTASSIUM 3.6  --   --   --  3.8  --  4.5  --  3.2*  --   --   --   --   --   --  4.0  --  3.8   CHLORIDE  --   --   --   --  97*  --   --   --  98  --   --   --   --   --   --  99  --  97*   CO2  --   --   --   --  39*  --   --   --  40*  --   --   --   --   --   --  29  --  28   BUN  --   --   --   --  25  --   --   --  25  --   --   --   --   --   --  31*  --  24   CR  --   --   --   --  0.70  --   --   --  0.63  --   --   --   --   --   --  0.86  --  0.77   ANIONGAP  --   --   --   --  6  --   --   --  7  --   --   --   --   --   --  8  --  15   JOEY  --   --   --   --  10.4  --   --   --  9.0  --   --   --   --   --   --  9.1  --  8.9   GLC  --  160* 223* 105* 116   < >  --    < > 99   < >  --    < >  --   --    < > 189*   < > 117   ALBUMIN  --   --   --   --   --   --   --   --   --   --   --   --  3.0*  --   --   --   --  3.1*   PROTTOTAL  --   --   --   --   --   --   --   --   --   --   --   --  6.6  --   --   --   --  6.8   BILITOTAL  --   --   --   --   --   --   --   --   --   --   --   --  0.9  --   --   --   --  1.3*   ALKPHOS  --   --   --   --   --   --   --   --   --   --   --   --  112  --   --   --   --  129*   ALT  --   --   --   --   --   --   --   --   --   --   --   --  24  --   --   --   --  35   AST  --   --   --   --   --   --   --   --   --   --   --   --  21  --   --   --   --  34   LIPASE  --   --   --   --   --   --   --   --   --   --   --   --   --   --   --   --   --  11    < > = values in this interval not displayed.     Recent Labs   Lab Test 05/24/23  1015 02/08/23  0759 01/12/23  0435   * 867* 220*     No lab results found in last 7 days.    Invalid input(s): LDLCALC  Lab Results   Component Value Date     05/29/2023    CO2 39 05/29/2023    BUN 25 05/29/2023     Lab Results   Component Value Date    WBC 7.8 05/30/2023    HGB 9.3 05/30/2023    HCT 33.9 05/30/2023    MCV 79 05/30/2023     05/30/2023     Lab Results   Component Value Date    CHOL 151 07/25/2022     TRIG 72 07/25/2022    HDL 84 07/25/2022     Lab Results   Component Value Date    INR 1.34 02/08/2023     Lab Results   Component Value Date    TROPONINI 0.07 05/24/2023    TROPONINI 0.08 05/24/2023    TROPONINI 0.09 02/08/2023     Lab Results   Component Value Date    TSH 1.17 05/24/2023           Current Inpatient Scheduled Medications   Scheduled Meds:    apixaban ANTICOAGULANT  5 mg Oral BID     atorvastatin  20 mg Oral At Bedtime     digoxin  125 mcg Oral Daily     diltiazem  30 mg Oral Q8H     empagliflozin  10 mg Oral Daily     fluticasone-vilanterol  1 puff Inhalation Daily     furosemide  40 mg Oral BID     gabapentin  600 mg Oral TID     insulin aspart  1-7 Units Subcutaneous TID AC     insulin aspart  1-5 Units Subcutaneous At Bedtime     iron sucrose  300 mg Intravenous Q72H     magnesium oxide  400 mg Oral Daily     metoprolol tartrate  12.5 mg Oral BID     nicotine   Transdermal Q8H     pantoprazole  40 mg Oral Daily     sodium chloride (PF)  3 mL Intracatheter Q8H     sucralfate  1 g Oral 4x Daily AC & HS     Continuous Infusions:    dilTIAZem Stopped (05/29/23 2003)     - MEDICATION INSTRUCTIONS -              Medications Prior to Admission   Prior to Admission medications    Medication Sig Start Date End Date Taking? Authorizing Provider   albuterol (PROAIR HFA/PROVENTIL HFA/VENTOLIN HFA) 108 (90 Base) MCG/ACT inhaler INHALE 2 PUFFS BY MOUTH EVERY 4 HOURS AS NEEDED FOR WHEEZING 5/20/22  Yes Cammy Bui MD   apixaban ANTICOAGULANT (ELIQUIS) 5 MG tablet Take 1 tablet (5 mg) by mouth 2 times daily 3/21/23  Yes Cammy Bui MD   atorvastatin (LIPITOR) 20 MG tablet TAKE 1 TABLET(20 MG) BY MOUTH AT BEDTIME 10/9/22  Yes Cammy Bui MD   diltiazem ER COATED BEADS (CARDIZEM CD/CARTIA XT) 180 MG 24 hr capsule TAKE 1 CAPSULE(180 MG) BY MOUTH DAILY 3/11/23  Yes Cammy Bui MD   fluticasone-vilanterol (BREO ELLIPTA) 200-25 MCG/ACT  inhaler Inhale 1 puff into the lungs daily 3/21/23  Yes Cammy Bui MD   furosemide (LASIX) 40 MG tablet Take 1 tablet (40 mg) by mouth 2 times daily 3/21/23  Yes Cammy Bui MD   gabapentin (NEURONTIN) 600 MG tablet Take 1 tablet (600 mg) by mouth 3 times daily 3/21/23  Yes Cammy Bui MD   ipratropium - albuterol 0.5 mg/2.5 mg/3 mL (DUONEB) 0.5-2.5 (3) MG/3ML neb solution Take 1 vial (3 mLs) by nebulization every 4 hours as needed for shortness of breath / dyspnea or wheezing 10/31/22  Yes Cammy Bui MD   magnesium oxide (MAG-OX) 400 MG tablet Take 1 tablet (400 mg) by mouth daily 2/9/23  Yes Angela Kaiser MD   meclizine (ANTIVERT) 25 MG tablet TAKE 1 TABLET(25 MG) BY MOUTH THREE TIMES DAILY AS NEEDED FOR DIZZINESS OR NAUSEA 10/28/22  Yes Cammy Bui MD   metFORMIN (GLUCOPHAGE) 500 MG tablet TAKE 1 TABLET(500 MG) BY MOUTH TWICE DAILY WITH MEALS 2/19/23  Yes Cammy Bui MD   nicotine (NICOTROL) 10 MG inhaler Use 1 cartridge as needed for urge to smoke by puffing over course of 20min.  Use 6-16 cart/day; reduce number of cart/day over 6-12 weeks. 4/25/23  Yes Cammy Bui MD   nystatin (NYSTOP) powder [NYSTATIN (NYSTOP) POWDER] Apply 1 application topically 2 (two) times a day as needed. 2-3 times to affected area(s). 7/21/20  Yes Cammy Bui MD   omeprazole (PRILOSEC) 20 MG DR capsule Take 20 mg by mouth daily   Yes Unknown, Entered By History   oxyCODONE IR (ROXICODONE) 10 MG tablet Take 1 tablet (10 mg) by mouth every 6 hours as needed for moderate to severe pain 4/25/23  Yes Cammy Bui MD   potassium chloride ER (KLOR-CON M) 20 MEQ CR tablet Take 2 tablets (40 mEq) by mouth daily 1/20/23  Yes Louis Dutta MD   sucralfate (CARAFATE) 1 GM tablet TAKE 1 TABLET BY MOUTH FOUR TIMES DAILY. CRUSH TABLET AND MIX IT WITH A LITTLE WATER THEN  "SWALLOW 1/24/23  Yes Cammy Bui MD   ACCU-CHEK SOFTCLIX LANCETS lancets [ACCU-CHEK SOFTCLIX LANCETS LANCETS] TEST THREE TIMES DAILY 8/28/18   Cammy Bui MD   BD ULTRA-FINE SHORT PEN NEEDLE 31 gauge x 5/16\" Ndle [BD ULTRA-FINE SHORT PEN NEEDLE 31 GAUGE X 5/16\" NDLE] TEST FOUR TIMES DAILY WITH MEALS AND AT BEDTIME 7/24/19   Cammy Bui MD   blood-glucose meter (ONETOUCH VERIO IQ METER) Misc [BLOOD-GLUCOSE METER (ONETOUCH VERIO IQ METER) MISC] Check blood sugar three times a day. 10/30/19   Cammy Bui MD   diaper,brief,adult,disposable (ADULT BRIEFS - LARGE) Misc [DIAPER,BRIEF,ADULT,DISPOSABLE (ADULT BRIEFS - LARGE) MISC] Use 3-4 daily as needed for incontinence 8/16/19   Cammy Bui MD   generic lancets (FINGERSTIX LANCETS) [GENERIC LANCETS (FINGERSTIX LANCETS)] Dispense brand per patient's insurance at pharmacy discretion. 3/5/19   Cammy Bui MD   guaiFENesin-codeine (ROBITUSSIN AC) 100-10 MG/5ML solution Take 5-10 mLs by mouth every 4 hours as needed for cough 5/1/23   Cammy Bui MD   naloxone (NARCAN) 4 MG/0.1ML nasal spray Spray 1 spray (4 mg) into one nostril alternating nostrils once as needed for opioid reversal every 2-3 minutes until assistance arrives 2/21/22   Cammy Bui MD   ONETOUCH VERIO IQ test strip USE TO TEST THREE TIMES DAILY 4/20/23   Cammy Bui MD               "

## 2023-05-31 ENCOUNTER — APPOINTMENT (OUTPATIENT)
Dept: PHYSICAL THERAPY | Facility: CLINIC | Age: 73
DRG: 308 | End: 2023-05-31
Attending: INTERNAL MEDICINE
Payer: COMMERCIAL

## 2023-05-31 ENCOUNTER — APPOINTMENT (OUTPATIENT)
Dept: OCCUPATIONAL THERAPY | Facility: CLINIC | Age: 73
DRG: 308 | End: 2023-05-31
Attending: INTERNAL MEDICINE
Payer: COMMERCIAL

## 2023-05-31 LAB
ANION GAP SERPL CALCULATED.3IONS-SCNC: 12 MMOL/L (ref 5–18)
BUN SERPL-MCNC: 22 MG/DL (ref 8–28)
CALCIUM SERPL-MCNC: 9.3 MG/DL (ref 8.5–10.5)
CHLORIDE BLD-SCNC: 97 MMOL/L (ref 98–107)
CO2 SERPL-SCNC: 34 MMOL/L (ref 22–31)
CREAT SERPL-MCNC: 0.63 MG/DL (ref 0.6–1.1)
ERYTHROCYTE [DISTWIDTH] IN BLOOD BY AUTOMATED COUNT: 24.5 % (ref 10–15)
GFR SERPL CREATININE-BSD FRML MDRD: >90 ML/MIN/1.73M2
GLUCOSE BLD-MCNC: 116 MG/DL (ref 70–125)
GLUCOSE BLDC GLUCOMTR-MCNC: 103 MG/DL (ref 70–99)
GLUCOSE BLDC GLUCOMTR-MCNC: 116 MG/DL (ref 70–99)
GLUCOSE BLDC GLUCOMTR-MCNC: 137 MG/DL (ref 70–99)
GLUCOSE BLDC GLUCOMTR-MCNC: 145 MG/DL (ref 70–99)
HCT VFR BLD AUTO: 36.1 % (ref 35–47)
HGB BLD-MCNC: 10 G/DL (ref 11.7–15.7)
MAGNESIUM SERPL-MCNC: 1.9 MG/DL (ref 1.8–2.6)
MCH RBC QN AUTO: 21.8 PG (ref 26.5–33)
MCHC RBC AUTO-ENTMCNC: 27.7 G/DL (ref 31.5–36.5)
MCV RBC AUTO: 79 FL (ref 78–100)
PLATELET # BLD AUTO: 253 10E3/UL (ref 150–450)
POTASSIUM BLD-SCNC: 4 MMOL/L (ref 3.5–5)
POTASSIUM BLD-SCNC: 4 MMOL/L (ref 3.5–5)
RBC # BLD AUTO: 4.58 10E6/UL (ref 3.8–5.2)
SODIUM SERPL-SCNC: 143 MMOL/L (ref 136–145)
WBC # BLD AUTO: 7.6 10E3/UL (ref 4–11)

## 2023-05-31 PROCEDURE — 250N000013 HC RX MED GY IP 250 OP 250 PS 637: Performed by: STUDENT IN AN ORGANIZED HEALTH CARE EDUCATION/TRAINING PROGRAM

## 2023-05-31 PROCEDURE — 36415 COLL VENOUS BLD VENIPUNCTURE: CPT | Performed by: STUDENT IN AN ORGANIZED HEALTH CARE EDUCATION/TRAINING PROGRAM

## 2023-05-31 PROCEDURE — 36415 COLL VENOUS BLD VENIPUNCTURE: CPT | Performed by: INTERNAL MEDICINE

## 2023-05-31 PROCEDURE — 250N000013 HC RX MED GY IP 250 OP 250 PS 637: Performed by: INTERNAL MEDICINE

## 2023-05-31 PROCEDURE — 97116 GAIT TRAINING THERAPY: CPT | Mod: GP

## 2023-05-31 PROCEDURE — 85027 COMPLETE CBC AUTOMATED: CPT | Performed by: INTERNAL MEDICINE

## 2023-05-31 PROCEDURE — 97110 THERAPEUTIC EXERCISES: CPT | Mod: GP

## 2023-05-31 PROCEDURE — 250N000011 HC RX IP 250 OP 636: Performed by: INTERNAL MEDICINE

## 2023-05-31 PROCEDURE — 258N000003 HC RX IP 258 OP 636: Performed by: INTERNAL MEDICINE

## 2023-05-31 PROCEDURE — 97166 OT EVAL MOD COMPLEX 45 MIN: CPT | Mod: GO

## 2023-05-31 PROCEDURE — 210N000002 HC R&B HEART CARE

## 2023-05-31 PROCEDURE — 83735 ASSAY OF MAGNESIUM: CPT | Performed by: STUDENT IN AN ORGANIZED HEALTH CARE EDUCATION/TRAINING PROGRAM

## 2023-05-31 PROCEDURE — 84132 ASSAY OF SERUM POTASSIUM: CPT | Performed by: STUDENT IN AN ORGANIZED HEALTH CARE EDUCATION/TRAINING PROGRAM

## 2023-05-31 PROCEDURE — 99233 SBSQ HOSP IP/OBS HIGH 50: CPT | Performed by: INTERNAL MEDICINE

## 2023-05-31 PROCEDURE — 99232 SBSQ HOSP IP/OBS MODERATE 35: CPT | Performed by: INTERNAL MEDICINE

## 2023-05-31 PROCEDURE — 82310 ASSAY OF CALCIUM: CPT | Performed by: INTERNAL MEDICINE

## 2023-05-31 PROCEDURE — 97535 SELF CARE MNGMENT TRAINING: CPT | Mod: GO

## 2023-05-31 PROCEDURE — 97161 PT EVAL LOW COMPLEX 20 MIN: CPT | Mod: GP

## 2023-05-31 RX ADMIN — METOPROLOL TARTRATE 12.5 MG: 25 TABLET, FILM COATED ORAL at 09:52

## 2023-05-31 RX ADMIN — APIXABAN 5 MG: 5 TABLET, FILM COATED ORAL at 09:52

## 2023-05-31 RX ADMIN — Medication 400 MG: at 09:52

## 2023-05-31 RX ADMIN — GABAPENTIN 600 MG: 600 TABLET, FILM COATED ORAL at 09:52

## 2023-05-31 RX ADMIN — FUROSEMIDE 40 MG: 40 TABLET ORAL at 09:51

## 2023-05-31 RX ADMIN — SUCRALFATE 1 G: 1 TABLET ORAL at 22:50

## 2023-05-31 RX ADMIN — GABAPENTIN 600 MG: 600 TABLET, FILM COATED ORAL at 20:27

## 2023-05-31 RX ADMIN — APIXABAN 5 MG: 5 TABLET, FILM COATED ORAL at 20:26

## 2023-05-31 RX ADMIN — ATORVASTATIN CALCIUM 20 MG: 10 TABLET, FILM COATED ORAL at 20:26

## 2023-05-31 RX ADMIN — DILTIAZEM HYDROCHLORIDE 180 MG: 180 CAPSULE, EXTENDED RELEASE ORAL at 09:52

## 2023-05-31 RX ADMIN — EMPAGLIFLOZIN 10 MG: 10 TABLET, FILM COATED ORAL at 09:52

## 2023-05-31 RX ADMIN — IRON SUCROSE 300 MG: 20 INJECTION, SOLUTION INTRAVENOUS at 13:51

## 2023-05-31 RX ADMIN — METOPROLOL TARTRATE 12.5 MG: 25 TABLET, FILM COATED ORAL at 22:50

## 2023-05-31 RX ADMIN — PANTOPRAZOLE SODIUM 40 MG: 40 TABLET, DELAYED RELEASE ORAL at 09:51

## 2023-05-31 RX ADMIN — DIGOXIN 125 MCG: 125 TABLET ORAL at 09:52

## 2023-05-31 RX ADMIN — SUCRALFATE 1 G: 1 TABLET ORAL at 11:04

## 2023-05-31 RX ADMIN — FLUTICASONE FUROATE AND VILANTEROL TRIFENATATE 1 PUFF: 200; 25 POWDER RESPIRATORY (INHALATION) at 09:51

## 2023-05-31 RX ADMIN — SUCRALFATE 1 G: 1 TABLET ORAL at 08:05

## 2023-05-31 RX ADMIN — SUCRALFATE 1 G: 1 TABLET ORAL at 16:05

## 2023-05-31 RX ADMIN — GABAPENTIN 600 MG: 600 TABLET, FILM COATED ORAL at 13:51

## 2023-05-31 RX ADMIN — FUROSEMIDE 40 MG: 40 TABLET ORAL at 16:05

## 2023-05-31 ASSESSMENT — ACTIVITIES OF DAILY LIVING (ADL)
ADLS_ACUITY_SCORE: 37
ADLS_ACUITY_SCORE: 36
ADLS_ACUITY_SCORE: 33
ADLS_ACUITY_SCORE: 36
ADLS_ACUITY_SCORE: 36
ADLS_ACUITY_SCORE: 37
ADLS_ACUITY_SCORE: 33
ADLS_ACUITY_SCORE: 36
ADLS_ACUITY_SCORE: 33
ADLS_ACUITY_SCORE: 33
ADLS_ACUITY_SCORE: 37
ADLS_ACUITY_SCORE: 36

## 2023-05-31 NOTE — PROGRESS NOTES
` [facility] [address], [place] [address]   [email or web address]   OFFICE: [phone]            Impression and Plan     1.  Atrial flutter/fibrillation.  Mary Kay did have intent atrial fibrillation ablation/PVI in 2012.  Presently, rate control strategy being pursued.    Continue apixaban.    Continue digoxin 125 mcg daily.  Continue Cardizem  mg daily.  Continue metoprolol tartrate 12.5 mg twice daily.    2.  Aortic stenosis.  Cecilia has known severe aortic stenosis.  Echocardiogram 25 May 2023 revealed a mean gradient of 47 mmHg with peak velocity of 4.3 m/s.  Calculated valve area 0.82-0.89 cm .  Dimensionless index is 0.25 (see Cardiac Diagnostics section below).  Mary Kay has already been referred to the Valve Clinic for consideration of TAVR.    3.  Coronary artery disease.  Cecilia has coronary artery disease by virtue of CT scan earlier this year on 10 January 2023 revealing the presence of coronary calcification (not quantified).    4.  Dyslipidemia.  Lipid profile 25 July 2022 revealed LDL 53 mg/dL and HDL 84 mg/dL 2.    5.  COPD exacerbation.  Management plan as outlined by Hospital Service.    6.  Anemia.  Management plan as outlined by the providers.  Appreciate Hematology/Oncology & Gastroenterology  Input.    Anticipate dismissal from hospital today, 31 May 2023.  Notes in Epic indicate referral has already been made to the Valve Clinic by Dr. Savanna Loya with follow-up note by Jennifer Perales confirming.  We will also make arrangements for Mary Kay follow-up with Dr. Surendra Pizano (Ted) in approximately 6 weeks (request already submitted in Epic).  Lastly, we will also refer to the Atrial Fibrillation clinic for follow-up of her atrial arrhythmias.  Aforementioned and follow-up planning was reviewed with Mary Kay again this morning.      Primary Cardiologist: Dr. Surendra Pizano (Ted)  "    Subjective     Overall feels a bit \"better\".  Denies chest pain.  Breathing perhaps a bit improved from yesterday.      Cardiac Diagnostics   Telemetry (personally reviewed): Suspected atrial flutter.    Echocardiogram 25 May 2023:  Normal left ventricular size and systolic performance with ejection fraction of 60 to 65%.  Severe aortic stenosis.  The mean gradient is measured at 47 mmHg with peak velocity of 4.3 m/s.  Calculated valve area 0.82-0.89 cm .  Dimensionless index is 0.25.  Mild-moderate aortic insufficiency.  Mild right ventricular enlargement with normal right ventricular systolic performance.  Mild left atrial enlargement.  Right atrium of normal dimension.    Twelve-lead ECG (personally reviewed) 30 May 2023: Atrial flutter with 4-1 conduction.  Heart rate 91 bpm.    Twelve-lead ECG (personally reviewed 24 May 2023: Atrial fibrillation/flutter with heart rate of 141 bpm.  Left anterior fascicular block.  Nonspecific T wave changes    Physical Examination       /56 (BP Location: Right arm)   Pulse 89   Temp 97.7  F (36.5  C) (Oral)   Resp 18   Ht 1.676 m (5' 6\")   Wt 63.2 kg (139 lb 6.4 oz)   SpO2 90%   BMI 22.50 kg/m          Vitals:    05/27/23 0411 05/28/23 0523 05/29/23 0551 05/30/23 0400   Weight: 63.8 kg (140 lb 9.6 oz) 62.8 kg (138 lb 8 oz) 62.1 kg (137 lb) 60.1 kg (132 lb 7.9 oz)    05/31/23 0538   Weight: 63.2 kg (139 lb 6.4 oz)              Intake/Output Summary (Last 24 hours) at 5/30/2023 0631  Last data filed at 5/30/2023 0441  Gross per 24 hour   Intake 1620 ml   Output 1200 ml   Net 420 ml     The patient is alert and oriented times three. Sclerae are anicteric. Mucosal membranes are moist. Jugular venous pressure is reasonable no significant adenopathy/thyromegally appreciated. Lungs are are noticeably diminished bilaterally. On cardiovascular exam, the patient has a fairly regular S1 and S2.  SM.  Abdomen is soft and non-tender. Extremities reveal no clubbing, " "cyanosis.         Imaging     Chest radiograph 24 May 2023:  There is a small to moderate right pleural effusion. Adjacent opacity in the basal right chest consistent with multisegment lower/middle lobe atelectasis, similar to February 2023. There is minimal pleural fluid in the left posterior costophrenic sulcus.  No new, superimposed interstitial or alveolar lung opacities.  The lower right heart border remains silhouetted. No change in mildly enlarged cardiac silhouette. Vascular pedicle width is normal.  Degenerative osteophytes of the thoracic spine and right glenohumeral osteoarthrosis is again noted.    CT scan of chest 10 January 2023:  Interstitial thickening throughout both lungs, likely relating to interstitial pulmonary edema. There are also a small to moderate-sized right pleural effusion and small left pleural effusion.   A small region of patchy opacities in the right middle lobe, likely representing pneumonia.  Mild to moderate emphysematous changes in the lungs.  Coronary calcification: \"Present\".      Lab Results     Recent Labs   Lab 05/31/23  0550 05/30/23  2101 05/30/23  1652 05/30/23  1139 05/30/23  0651 05/30/23  0536 05/29/23  1204 05/29/23  0749 05/28/23  0736 05/28/23  0445 05/27/23  2144 05/27/23  1809 05/26/23  1334 05/26/23  1307 05/26/23  1152 05/25/23  0737 05/25/23  0507 05/24/23  1749 05/24/23  1015   WBC  --   --   --   --   --  7.8  --   --   --  7.5  --   --   --   --  10.8  --  7.6  --  8.5   HGB  --   --   --   --   --  9.3*  --   --   --  9.0*  --  9.3*   < >  --  8.7*   < > 7.3*   < > 7.9*   MCV  --   --   --   --   --  79  --   --   --  76*  --   --   --   --  74*  --  72*  --  72*   PLT  --   --   --   --   --  262  --   --   --  283  --   --   --   --  341  --  357  --  438   NA  --   --   --   --   --   --   --  142  --  145  --   --   --   --   --   --  136  --  140   POTASSIUM 4.0  --   --   --   --  3.6  --  3.8   < > 3.2*  --   --   --   --   --   --  4.0  --  3.8 "   CHLORIDE  --   --   --   --   --   --   --  97*  --  98  --   --   --   --   --   --  99  --  97*   CO2  --   --   --   --   --   --   --  39*  --  40*  --   --   --   --   --   --  29  --  28   BUN  --   --   --   --   --   --   --  25  --  25  --   --   --   --   --   --  31*  --  24   CR  --   --   --   --   --   --   --  0.70  --  0.63  --   --   --   --   --   --  0.86  --  0.77   ANIONGAP  --   --   --   --   --   --   --  6  --  7  --   --   --   --   --   --  8  --  15   JOEY  --   --   --   --   --   --   --  10.4  --  9.0  --   --   --   --   --   --  9.1  --  8.9   GLC  --  183* 143* 118*   < >  --    < > 116   < > 99   < >  --    < >  --   --    < > 189*   < > 117   ALBUMIN  --   --   --   --   --   --   --   --   --   --   --   --   --  3.0*  --   --   --   --  3.1*   PROTTOTAL  --   --   --   --   --   --   --   --   --   --   --   --   --  6.6  --   --   --   --  6.8   BILITOTAL  --   --   --   --   --   --   --   --   --   --   --   --   --  0.9  --   --   --   --  1.3*   ALKPHOS  --   --   --   --   --   --   --   --   --   --   --   --   --  112  --   --   --   --  129*   ALT  --   --   --   --   --   --   --   --   --   --   --   --   --  24  --   --   --   --  35   AST  --   --   --   --   --   --   --   --   --   --   --   --   --  21  --   --   --   --  34   LIPASE  --   --   --   --   --   --   --   --   --   --   --   --   --   --   --   --   --   --  11    < > = values in this interval not displayed.     Recent Labs   Lab Test 05/24/23  1015 02/08/23  0759 01/12/23  0435   * 867* 220*     No lab results found in last 7 days.    Invalid input(s): LDLCALC  Lab Results   Component Value Date     05/29/2023    CO2 39 05/29/2023    BUN 25 05/29/2023     Lab Results   Component Value Date    WBC 7.8 05/30/2023    HGB 9.3 05/30/2023    HCT 33.9 05/30/2023    MCV 79 05/30/2023     05/30/2023     Lab Results   Component Value Date    CHOL 151 07/25/2022    TRIG 72 07/25/2022     HDL 84 07/25/2022     Lab Results   Component Value Date    INR 1.34 02/08/2023     Lab Results   Component Value Date    TROPONINI 0.07 05/24/2023    TROPONINI 0.08 05/24/2023    TROPONINI 0.09 02/08/2023     Lab Results   Component Value Date    TSH 1.17 05/24/2023           Current Inpatient Scheduled Medications   Scheduled Meds:    apixaban ANTICOAGULANT  5 mg Oral BID     atorvastatin  20 mg Oral At Bedtime     digoxin  125 mcg Oral Daily     diltiazem ER COATED BEADS  180 mg Oral Daily     empagliflozin  10 mg Oral Daily     fluticasone-vilanterol  1 puff Inhalation Daily     furosemide  40 mg Oral BID     gabapentin  600 mg Oral TID     insulin aspart  1-7 Units Subcutaneous TID AC     insulin aspart  1-5 Units Subcutaneous At Bedtime     iron sucrose  300 mg Intravenous Q72H     magnesium oxide  400 mg Oral Daily     metoprolol tartrate  12.5 mg Oral BID     nicotine   Transdermal Q8H     pantoprazole  40 mg Oral Daily     sodium chloride (PF)  3 mL Intracatheter Q8H     sucralfate  1 g Oral 4x Daily AC & HS     Continuous Infusions:    dilTIAZem Stopped (05/29/23 2003)     - MEDICATION INSTRUCTIONS -              Medications Prior to Admission   Prior to Admission medications    Medication Sig Start Date End Date Taking? Authorizing Provider   albuterol (PROAIR HFA/PROVENTIL HFA/VENTOLIN HFA) 108 (90 Base) MCG/ACT inhaler INHALE 2 PUFFS BY MOUTH EVERY 4 HOURS AS NEEDED FOR WHEEZING 5/20/22  Yes Cammy Bui MD   apixaban ANTICOAGULANT (ELIQUIS) 5 MG tablet Take 1 tablet (5 mg) by mouth 2 times daily 3/21/23  Yes Cammy Bui MD   atorvastatin (LIPITOR) 20 MG tablet TAKE 1 TABLET(20 MG) BY MOUTH AT BEDTIME 10/9/22  Yes Cammy Bui MD   diltiazem ER COATED BEADS (CARDIZEM CD/CARTIA XT) 180 MG 24 hr capsule TAKE 1 CAPSULE(180 MG) BY MOUTH DAILY 3/11/23  Yes Cammy Bui MD   fluticasone-vilanterol (BREO ELLIPTA) 200-25 MCG/ACT inhaler  Inhale 1 puff into the lungs daily 3/21/23  Yes Cammy Bui MD   furosemide (LASIX) 40 MG tablet Take 1 tablet (40 mg) by mouth 2 times daily 3/21/23  Yes Cammy Bui MD   gabapentin (NEURONTIN) 600 MG tablet Take 1 tablet (600 mg) by mouth 3 times daily 3/21/23  Yes Cammy Bui MD   ipratropium - albuterol 0.5 mg/2.5 mg/3 mL (DUONEB) 0.5-2.5 (3) MG/3ML neb solution Take 1 vial (3 mLs) by nebulization every 4 hours as needed for shortness of breath / dyspnea or wheezing 10/31/22  Yes Cammy Bui MD   magnesium oxide (MAG-OX) 400 MG tablet Take 1 tablet (400 mg) by mouth daily 2/9/23  Yes Angela Kaiser MD   meclizine (ANTIVERT) 25 MG tablet TAKE 1 TABLET(25 MG) BY MOUTH THREE TIMES DAILY AS NEEDED FOR DIZZINESS OR NAUSEA 10/28/22  Yes Cammy Bui MD   metFORMIN (GLUCOPHAGE) 500 MG tablet TAKE 1 TABLET(500 MG) BY MOUTH TWICE DAILY WITH MEALS 2/19/23  Yes Cammy Bui MD   nicotine (NICOTROL) 10 MG inhaler Use 1 cartridge as needed for urge to smoke by puffing over course of 20min.  Use 6-16 cart/day; reduce number of cart/day over 6-12 weeks. 4/25/23  Yes Cammy Bui MD   nystatin (NYSTOP) powder [NYSTATIN (NYSTOP) POWDER] Apply 1 application topically 2 (two) times a day as needed. 2-3 times to affected area(s). 7/21/20  Yes Cammy Bui MD   omeprazole (PRILOSEC) 20 MG DR capsule Take 20 mg by mouth daily   Yes Unknown, Entered By History   oxyCODONE IR (ROXICODONE) 10 MG tablet Take 1 tablet (10 mg) by mouth every 6 hours as needed for moderate to severe pain 4/25/23  Yes Cammy Bui MD   potassium chloride ER (KLOR-CON M) 20 MEQ CR tablet Take 2 tablets (40 mEq) by mouth daily 1/20/23  Yes Louis Dutta MD   sucralfate (CARAFATE) 1 GM tablet TAKE 1 TABLET BY MOUTH FOUR TIMES DAILY. CRUSH TABLET AND MIX IT WITH A LITTLE WATER THEN SWALLOW  "1/24/23  Yes Cammy Bui MD   ACCU-CHEK SOFTCLIX LANCETS lancets [ACCU-CHEK SOFTCLIX LANCETS LANCETS] TEST THREE TIMES DAILY 8/28/18   Cammy Bui MD   BD ULTRA-FINE SHORT PEN NEEDLE 31 gauge x 5/16\" Ndle [BD ULTRA-FINE SHORT PEN NEEDLE 31 GAUGE X 5/16\" NDLE] TEST FOUR TIMES DAILY WITH MEALS AND AT BEDTIME 7/24/19   Cammy Bui MD   blood-glucose meter (ONETOUCH VERIO IQ METER) Misc [BLOOD-GLUCOSE METER (ONETOUCH VERIO IQ METER) MISC] Check blood sugar three times a day. 10/30/19   Cammy Bui MD   diaper,brief,adult,disposable (ADULT BRIEFS - LARGE) Misc [DIAPER,BRIEF,ADULT,DISPOSABLE (ADULT BRIEFS - LARGE) MISC] Use 3-4 daily as needed for incontinence 8/16/19   Cammy Bui MD   generic lancets (FINGERSTIX LANCETS) [GENERIC LANCETS (FINGERSTIX LANCETS)] Dispense brand per patient's insurance at pharmacy discretion. 3/5/19   Cammy Bui MD   guaiFENesin-codeine (ROBITUSSIN AC) 100-10 MG/5ML solution Take 5-10 mLs by mouth every 4 hours as needed for cough 5/1/23   Cammy Bui MD   naloxone (NARCAN) 4 MG/0.1ML nasal spray Spray 1 spray (4 mg) into one nostril alternating nostrils once as needed for opioid reversal every 2-3 minutes until assistance arrives 2/21/22   Cammy Bui MD   ONETOUCH VERIO IQ test strip USE TO TEST THREE TIMES DAILY 4/20/23   Cammy Bui MD               "

## 2023-05-31 NOTE — PROGRESS NOTES
Home Oxygen Assessment Tools  Patient's 02 sat on RA at rest 84% for 4 minutes. Patient is on 2 LPM/%  (02 device) Sp02 95%, after 2 minutes of standing on the side of the bed. If patient's Sp02 at rest is less than 88%, then oxygen may be needed and must monitor patient's Sp02 with activity. Patient 02 89% sat on  RA with activity after 1 minutes. Patient's Sp02 94% on 2 LPM/02% after 2 minutes of ambulating in room. Pt denies sob, but c/o weakness, feeling unsteady when walking.  Will notify MD

## 2023-05-31 NOTE — PROGRESS NOTES
SPIRITUAL HEALTH SERVICES Progress Note  WW  302    Visited due LOS, Betsy admitted for heart issues and difficulty breathing.  She was in good spirits with no expressed needs.  No plans to follow.     Ravindra Daugherty M.Div., [facility]  Staff    [phone]

## 2023-05-31 NOTE — PLAN OF CARE
Goal Outcome Evaluation:    Patient VSS, 1.5L NC, A&O. Denies pain, rested with eyes closed. Patient stated they are hopeful and encouraged to improved their activity level back to their pre-hospitalization baseline. Aflutter on tele, no tachycardia noted with activity overnight. No significant events during shift.    Problem: Malnutrition  Goal: Improved Nutritional Intake  Outcome: Progressing     Problem: Dysrhythmia  Goal: Normalized Cardiac Rhythm  Outcome: Progressing     Problem: Risk for Delirium  Goal: Improved Sleep  Outcome: Progressing

## 2023-05-31 NOTE — PLAN OF CARE
Problem: Dysrhythmia  Goal: Normalized Cardiac Rhythm  Outcome: Progressing   Goal Outcome Evaluation:  Pt afib to aflutter but ventricular rate better controlled; pt denies liteheadness, denies significant SOB, denies palpations.      Problem: Gas Exchange Impaired  Goal: Optimal Gas Exchange  Outcome: Not Progressing   Attempt to wean pt off oxygen while awake, sitting up  in chair.  Desats down to 83-84% on room air. Currently on 2L per n/c.  Lungs diminished but clear.  Home O2 evalaution completed; updated Dr Hartmann; pt very weak, felt unsteady and only able to ambulate short distances, yet denies SOB.  OT and PT ordered to evaluate pt.

## 2023-05-31 NOTE — PROGRESS NOTES
[facility]    Progress Note - Hospitalist Service    Physician Attestation   I, Mateo Hartmann DO, was present with the CECILIA student who participated in the service and in the documentation of the note.  I have verified the history and personally performed the physical exam and medical decision making.  I agree with the assessment and plan of care as documented in the note.      Mateo Hartmann DO  Date of Service (when I saw the patient): 05/31/23    Date of Admission:  5/24/2023    Assessment & Plan   Mary Kay Tejada is a 73 year old female with a hx of COPD, peripheral edema, DM, HTN, fibromyalgia, paroxysmal A flutter on apixiban, and anemia admitted on 5/24/2023 for SOB and tachycardia. ED diagnosis included COPD exacerbation, Atrial flutter, baker's cyst on R knee, new L pleural effusion, and chronic iron deficiency anemia.  Patient was treated for COPD exacerbation with oral steroids, doxycycline, DuoNebs and supplemental oxygen.  She has shown improvement from admission.  Will undergo home oxygen assessment today.  Patient's anemia required blood transfusion on May 26.  Anemia appears to be secondary to iron deficiency confirmed with laboratory studies.  Given IV iron on May 29, 2023.  Will recommend outpatient ferrous sulfate supplementation and follow-up with hematology.  Patient refused EGD or colonoscopy offered by gastroenterology at this time.  Heart failure was treated with IV diuresis on admission and transition to oral Lasix.  Thoracentesis was not performed as patient's effusion was improving with diuresis alone.  Cardiology was consulted for atrial flutter/fibrillation and managed with digoxin, Cardizem and metoprolol for rate control strategy.  Patient was continued on apixaban.  Aortic stenosis was noted on echo from May 25.  Patient has already been referred to valve clinic for consideration of TAVR.      Aflutter/fibrillation,   Continue apixaban, digoxin  125 mcg daily, Cardizem  mg daily, metoprolol tartrate 12.5 mg twice daily.  Appreciate cardiology recommendations.  Continue cardiac monitor.    Aortic stensosis.  Patient to follow-up with valve clinic for consideration of TAVR.      General weakness.   PT and OT evaluation today.     COPD exacerbation  Pleated prednisone burst during hospitalization.  Home O2 assessment today  Continue Breo Ellipta 1 puff daily  Continue albuterol 2 puffs every 4 hours as needed  Would recommend outpatient follow-up with pulmonology clinic     Chronic iron deficiency anemia  Transfusion required May 26  Anemia 2/2 iron deficiency and  confirmed w/ labs. IV iron was  started on 5/29/2023.   Repeat hemoglobin in 3 to 5 days.  Patient refused endoscopy work-up by [facility].  Follow-up outpatient with hematology for chronic anemia and likely ongoing transfusion needs.    Hx of HFpEF and current Pleural Effusion:  - Last thoracentesis was in Jan.  - BNP at 176 (867 in Jan)  - IV diuresis on 5/27 and 5/28. PO diuresis on 5/29 (continue for 5 days total)  - Monitor weight and I/Os  - Hold off on thoracentesis d/t anemia    Hyperlipidemia  continue statin     Diabetes mellitus type 2 with neuropathy  Hgb A1c resulted at 6.1 on 5/25  Resume metformin at discharge.   Sliding scale insulin, continue Medium dose   Continue gabapentin  Continue Jardiance      Hypomagnesemia  A/p- replacement protocol; continue home magnesium on discharge     Hypertension  - continue diltizem for both htn and aflutter    Dizziness  - stable but can continue PRN meclizine      Tobacco use  - encouraged decreasing and eventual cessation; continue nicotine inhaler      fibromyalgia   chronic pain syndrome  Assessment: Currently pain stable.  She is on a regimen of oxycodone.   Plan:  -Continue for now but modified with a lower range available: 5-10mg q6 hrs prn (instead of scheduled 10)        Diet: Combination Diet Low Saturated Fat Na  <2400mg Diet, No Caffeine Diet    DVT Prophylaxis: Apixiban   Chairez Catheter: Not present  Lines: None     Cardiac Monitoring: ACTIVE order. Indication: Tachyarrhythmias, acute (48 hours)  Code Status: Full Code      Clinically Significant Risk Factors              # Hypoalbuminemia: Lowest albumin = 3 g/dL at 5/26/2023  1:07 PM, will monitor as appropriate     # Hypertension: Noted on problem list         # Moderate Malnutrition: based on nutrition assessment         Disposition Plan      Expected Discharge Date: 05/31/2023    Discharge Delays: Oxygen Needs - Arrange Home O2  Destination: home with family;home with help/services  Discharge Comments: d/c 5/31? home o2?        The patient's care was discussed with the Attending Physician, Dr. Hartmann.    Santiago Ramos  Hospitalist Service  [facility]  Securely message with [employer] (more info)  Text page via [employer] Paging/Directory   ______________________________________________________________________    Interval History   Pause in Diltiazem overnight because HR was in the 50s    Physical Exam   Vital Signs: Temp: 98  F (36.7  C) Temp src: Oral BP: 109/56 Pulse: 88   Resp: 20 SpO2: 92 % O2 Device: Nasal cannula Oxygen Delivery: 1.5 LPM  Weight: 139 lbs 6.4 oz    Constitutional: awake, alert and no apparent distress  Respiratory: No increased work of breathing, good air exchange, clear to auscultation bilaterally, no crackles or wheezing  Cardiovascular: Normal apical impulse, regular rate and rhythm, normal S1 and S2, no S3 or S4, and no murmur noted  Skin: normal skin color, texture, turgor  Neurologic: Awake, alert, oriented to name, place and time.    Sensory is intact.  Babinski down going, Romberg negative, and gait is normal.    Medical Decision Making   60 MINUTES SPENT BY ME on the date of service doing chart review, history, exam, documentation & further activities per the note.      Data     I have personally reviewed the following  data over the past 24 hrs:    N/A  \   N/A   / N/A     143 97 (L) 22 /  116 (H)   4.0; 4.0 34 (H) 0.63 \       Imaging results reviewed over the past 24 hrs:   No results found for this or any previous visit (from the past 24 hour(s)).

## 2023-05-31 NOTE — PROGRESS NOTES
CLINICAL NUTRITION SERVICES - REASSESSMENT NOTE     Nutrition Prescription    RECOMMENDATIONS FOR MDs/PROVIDERS TO ORDER:  None    Malnutrition Status:    Moderate malnutrition in the context of chronic illness    Recommendations already ordered by Registered Dietitian (RD):  None    Future/Additional Recommendations:  Monitor oral intake and weight trends     EVALUATION OF THE PROGRESS TOWARD GOALS   Diet: Low Saturated Fat/2400 mg Sodium, no caffeine  Intake: % noted in chart. Patient is receiving 3 nutritionally adequate meals per day per healthtouch.      NEW FINDINGS   Patient reports that she has been eating very well and does not require any additional snacks or nutrition supplements.She has a good appetite.     GI: regular BMs noted  Labs and meds reviewed.  Skin: moderate edema noted    Weight trends: weight fluctuations may be related to fluid accumulation  05/31/23 0538 63.2 kg (139 lb 6.4 oz) Standing scale   05/30/23 0400 60.1 kg (132 lb 7.9 oz) Bed scale   05/29/23 0551 62.1 kg (137 lb) Standing scale   05/28/23 0523 62.8 kg (138 lb 8 oz) Standing scale   05/27/23 0411 63.8 kg (140 lb 9.6 oz) Standing scale   05/26/23 0457 64.4 kg (142 lb) Standing scale   05/25/23 0427 64.5 kg (142 lb 3.2 oz) Standing scale   05/24/23 0935 68 kg (150 lb)      CURRENT NUTRITION DIAGNOSIS  Malnutrition related to poor appetite as evidenced by moderate to severe muscle loss and decreased oral intake - progressing    INTERVENTIONS  Implementation  None    Goals  Patient to consume % of nutritionally adequate meal trays TID, or the equivalent with supplements/snacks. - met    Monitoring/Evaluation  Monitor oral intake and weight trends    Waleska Elise RDN, LD

## 2023-05-31 NOTE — PROGRESS NOTES
05/31/23 1500   Appointment Info   Signing Clinician's Name / Credentials (OT) Linda Davenport OTR/L OTD   Living Environment   People in Home child(david), adult   Current Living Arrangements house   Home Accessibility stairs to enter home   Number of Stairs, Main Entrance 2   Living Environment Comments Walk in shower with shower chair and grab bars, standard toilets, owns cane and walker, has supplemental O2 at home   Self-Care   Equipment Currently Used at Home cane, straight;grab bar, tub/shower;glucometer;walker, standard   Fall history within last six months no   Activity/Exercise/Self-Care Comment Son's girlfriend is PCA for 15.5 hours per week - assists with showering, medication mgmt, and transportation   General Information   Onset of Illness/Injury or Date of Surgery 05/24/23   Referring Physician Mateo Hartmann MD   Patient/Family Therapy Goal Statement (OT) To get stronger   Additional Occupational Profile Info/Pertinent History of Current Problem Mary Kay Tejada is a 73 year old female with a hx of COPD, peripheral edema, DM, HTN, fibromyalgia, paroxysmal A flutter on apixiban, and anemia admitted on 5/24/2023 for SOB and tachycardia. ED diagnosis included COPD exacerbation, Atrial flutter, baker's cyst on R knee, new L pleural effusion, and chronic iron deficiency anemia.  Patient was treated for COPD exacerbation with oral steroids, doxycycline, DuoNebs and supplemental oxygen.   Existing Precautions/Restrictions fall   Cognitive Status Examination   Orientation Status orientation to person, place and time   Affect/Mental Status (Cognitive) WFL   Follows Commands WFL   Visual Perception   Visual Impairment/Limitations WFL   Posture   Posture forward head position   Range of Motion Comprehensive   General Range of Motion bilateral upper extremity ROM WFL   Strength Comprehensive (MMT)   General Manual Muscle Testing (MMT) Assessment upper extremity strength deficits identified   Comment, General  Manual Muscle Testing (MMT) Assessment Generalized weakness   Bed Mobility   Bed Mobility supine-sit   Supine-Sit Isabela (Bed Mobility) contact guard   Assistive Device (Bed Mobility) bed rails   Transfers   Transfers sit-stand transfer;toilet transfer   Sit-Stand Transfer   Sit-Stand Isabela (Transfers) contact guard   Assistive Device (Sit-Stand Transfers) walker, front-wheeled   Toilet Transfer   Isabela Level (Toilet Transfer) contact guard   Assistive Device (Toilet Transfer) commode chair   Activities of Daily Living   BADL Assessment/Intervention lower body dressing;toileting   Lower Body Dressing Assessment/Training   Isabela Level (Lower Body Dressing) contact guard assist   Toileting   Isabela Level (Toileting) contact guard assist   Clinical Impression   Criteria for Skilled Therapeutic Interventions Met (OT) Yes, treatment indicated   OT Diagnosis Decreased ind with ADLs and safety   Influenced by the following impairments Atrial flutter   OT Problem List-Impairments impacting ADL activity tolerance impaired;strength;mobility;balance   Assessment of Occupational Performance 3-5 Performance Deficits   Identified Performance Deficits dressing, toileting, fxl mob/transfers   Planned Therapy Interventions (OT) ADL retraining;progressive activity/exercise;strengthening   Clinical Decision Making Complexity (OT) moderate complexity   Risk & Benefits of therapy have been explained evaluation/treatment results reviewed;care plan/treatment goals reviewed;risks/benefits reviewed;current/potential barriers reviewed;patient   OT Total Evaluation Time   OT Eval, Moderate Complexity Minutes (18310) 10   OT Goals   Therapy Frequency (OT) Daily   OT Predicted Duration/Target Date for Goal Attainment 06/07/23   OT Goals Hygiene/Grooming;Toilet Transfer/Toileting;Aerobic Activity;OT Goal 1   OT: Hygiene/Grooming modified independent;while standing   OT: Toilet Transfer/Toileting Modified  independent;toilet transfer;cleaning and garment management   OT: Perform aerobic activity with stable cardiovascular response intermittent activity;5 minutes   OT: Goal 1 Pt will demo understanding of at least 2 EC/WS techniques to increase ADL participation   Interventions   Interventions Quick Adds Self-Care/Home Management   Self-Care/Home Management   Self-Care/Home Mgmt/ADL, Compensatory, Meal Prep Minutes (61856) 12   Symptoms Noted During/After Treatment (Meal Preparation/Planning Training) fatigue   Treatment Detail/Skilled Intervention Fxl mob within room CGA, provided FWW for increased stability. Min cueing for walk safety. Toilet transfer SBA min cueing for hand placement onto grab bars. Returned to bedside chair - tolerated standing SBA for chair/room set up with FWW for stability. Left in bedside chair with direct handoff to NA   OT Discharge Planning   OT Plan EC/WS, g/h standing, COPD exercises   OT Discharge Recommendation (DC Rec) home with home care occupational therapy;home with assist   OT Rationale for DC Rec Pt would benefit from home OT eval for safety - has good support/assist from family and PCA   OT Brief overview of current status SBA fxl mob w/ FWW, toileting SBA   Total Session Time   Timed Code Treatment Minutes 12   Total Session Time (sum of timed and untimed services) 22

## 2023-06-01 ENCOUNTER — APPOINTMENT (OUTPATIENT)
Dept: PHYSICAL THERAPY | Facility: CLINIC | Age: 73
DRG: 308 | End: 2023-06-01
Payer: COMMERCIAL

## 2023-06-01 ENCOUNTER — APPOINTMENT (OUTPATIENT)
Dept: OCCUPATIONAL THERAPY | Facility: CLINIC | Age: 73
DRG: 308 | End: 2023-06-01
Payer: COMMERCIAL

## 2023-06-01 LAB
GLUCOSE BLDC GLUCOMTR-MCNC: 127 MG/DL (ref 70–99)
GLUCOSE BLDC GLUCOMTR-MCNC: 132 MG/DL (ref 70–99)
GLUCOSE BLDC GLUCOMTR-MCNC: 183 MG/DL (ref 70–99)
GLUCOSE BLDC GLUCOMTR-MCNC: 233 MG/DL (ref 70–99)
MAGNESIUM SERPL-MCNC: 1.9 MG/DL (ref 1.8–2.6)
POTASSIUM BLD-SCNC: 4 MMOL/L (ref 3.5–5)
RADIOLOGIST FLAGS: ABNORMAL

## 2023-06-01 PROCEDURE — 83735 ASSAY OF MAGNESIUM: CPT | Performed by: STUDENT IN AN ORGANIZED HEALTH CARE EDUCATION/TRAINING PROGRAM

## 2023-06-01 PROCEDURE — 97116 GAIT TRAINING THERAPY: CPT | Mod: GP

## 2023-06-01 PROCEDURE — 99233 SBSQ HOSP IP/OBS HIGH 50: CPT | Performed by: INTERNAL MEDICINE

## 2023-06-01 PROCEDURE — 120N000004 HC R&B MS OVERFLOW

## 2023-06-01 PROCEDURE — 250N000013 HC RX MED GY IP 250 OP 250 PS 637: Performed by: INTERNAL MEDICINE

## 2023-06-01 PROCEDURE — 99232 SBSQ HOSP IP/OBS MODERATE 35: CPT | Performed by: INTERNAL MEDICINE

## 2023-06-01 PROCEDURE — 97110 THERAPEUTIC EXERCISES: CPT | Mod: GP

## 2023-06-01 PROCEDURE — 97110 THERAPEUTIC EXERCISES: CPT | Mod: GO

## 2023-06-01 PROCEDURE — 36415 COLL VENOUS BLD VENIPUNCTURE: CPT | Performed by: STUDENT IN AN ORGANIZED HEALTH CARE EDUCATION/TRAINING PROGRAM

## 2023-06-01 PROCEDURE — 250N000013 HC RX MED GY IP 250 OP 250 PS 637: Performed by: STUDENT IN AN ORGANIZED HEALTH CARE EDUCATION/TRAINING PROGRAM

## 2023-06-01 PROCEDURE — 84132 ASSAY OF SERUM POTASSIUM: CPT | Performed by: STUDENT IN AN ORGANIZED HEALTH CARE EDUCATION/TRAINING PROGRAM

## 2023-06-01 PROCEDURE — 97535 SELF CARE MNGMENT TRAINING: CPT | Mod: GO

## 2023-06-01 RX ADMIN — FLUTICASONE FUROATE AND VILANTEROL TRIFENATATE 1 PUFF: 200; 25 POWDER RESPIRATORY (INHALATION) at 09:05

## 2023-06-01 RX ADMIN — ATORVASTATIN CALCIUM 20 MG: 10 TABLET, FILM COATED ORAL at 21:09

## 2023-06-01 RX ADMIN — SUCRALFATE 1 G: 1 TABLET ORAL at 21:09

## 2023-06-01 RX ADMIN — APIXABAN 5 MG: 5 TABLET, FILM COATED ORAL at 21:09

## 2023-06-01 RX ADMIN — GABAPENTIN 600 MG: 600 TABLET, FILM COATED ORAL at 13:57

## 2023-06-01 RX ADMIN — DIGOXIN 125 MCG: 125 TABLET ORAL at 09:02

## 2023-06-01 RX ADMIN — FUROSEMIDE 40 MG: 40 TABLET ORAL at 09:03

## 2023-06-01 RX ADMIN — SUCRALFATE 1 G: 1 TABLET ORAL at 13:57

## 2023-06-01 RX ADMIN — FUROSEMIDE 40 MG: 40 TABLET ORAL at 15:45

## 2023-06-01 RX ADMIN — SUCRALFATE 1 G: 1 TABLET ORAL at 09:02

## 2023-06-01 RX ADMIN — METOPROLOL TARTRATE 12.5 MG: 25 TABLET, FILM COATED ORAL at 21:09

## 2023-06-01 RX ADMIN — DILTIAZEM HYDROCHLORIDE 180 MG: 180 CAPSULE, EXTENDED RELEASE ORAL at 09:01

## 2023-06-01 RX ADMIN — GABAPENTIN 600 MG: 600 TABLET, FILM COATED ORAL at 21:09

## 2023-06-01 RX ADMIN — APIXABAN 5 MG: 5 TABLET, FILM COATED ORAL at 09:04

## 2023-06-01 RX ADMIN — GABAPENTIN 600 MG: 600 TABLET, FILM COATED ORAL at 09:03

## 2023-06-01 RX ADMIN — EMPAGLIFLOZIN 10 MG: 10 TABLET, FILM COATED ORAL at 09:21

## 2023-06-01 RX ADMIN — PANTOPRAZOLE SODIUM 40 MG: 40 TABLET, DELAYED RELEASE ORAL at 09:03

## 2023-06-01 RX ADMIN — METOPROLOL TARTRATE 12.5 MG: 25 TABLET, FILM COATED ORAL at 09:01

## 2023-06-01 RX ADMIN — Medication 400 MG: at 09:03

## 2023-06-01 RX ADMIN — SUCRALFATE 1 G: 1 TABLET ORAL at 16:47

## 2023-06-01 ASSESSMENT — ACTIVITIES OF DAILY LIVING (ADL)
ADLS_ACUITY_SCORE: 33
ADLS_ACUITY_SCORE: 36
ADLS_ACUITY_SCORE: 36
ADLS_ACUITY_SCORE: 33
ADLS_ACUITY_SCORE: 36
ADLS_ACUITY_SCORE: 33
ADLS_ACUITY_SCORE: 36
ADLS_ACUITY_SCORE: 36
ADLS_ACUITY_SCORE: 33
ADLS_ACUITY_SCORE: 36

## 2023-06-01 NOTE — PROGRESS NOTES
Care Management Follow Up    Length of Stay (days): 8    Expected Discharge Date: 06/01/2023     Concerns to be Addressed: discharge planning     Patient plan of care discussed at interdisciplinary rounds: Yes    Anticipated Discharge Disposition: Home     Anticipated Discharge Services: County Worker, Housekeeping/[employer], PCA  Anticipated Discharge DME: Oxygen    Patient/family educated on Medicare website which has current facility and service quality ratings:    Education Provided on the Discharge Plan:    Patient/Family in Agreement with the Plan: yes    Referrals Placed by CM/SW:    Private pay costs discussed: Not applicable    Additional Information:  Discussed with pt recommendation of home care, pt declined at this time.  Pt feels is ok to return home with continue with her PCA services.      LUZ Lincoln

## 2023-06-01 NOTE — PROGRESS NOTES
[facility]    Progress Note - Hospitalist Service       Date of Admission:  5/24/2023    Physician Attestation   I, Mateo Hartmann DO, was present with the medical/CECILIA student who participated in the service and in the documentation of the note.  I have verified the history and personally performed the physical exam and medical decision making.  I agree with the assessment and plan of care as documented in the note.        Mateo Hartmann DO  Date of Service (when I saw the patient): 06/01/23     50 MINUTES SPENT BY ME on the date of service doing chart review, history, exam, documentation & further activities per the note.      Assessment & Plan   73 year old female with a hx of COPD, peripheral edema, DM, HTN, A-flutter with anticoagulation on apixiban, anemia, and fibromyalgia admitted on 5/24/2023 for COPD exacerbation, atrial flutter, R knee baker's cyst, L sided pleural effusion, and chronic iron deficiency anemia. Pt is receiving steroids, duonebs, and doxycycline for her COPD exacerbation. Metoprolol, digoxin, diltiazem, and apixiban are being given for atrial flutter for both anticoagulation and rate control. Empagoflozin was started for cardiovascular health and diabetes control. Her pleural effusion/HF is being diuresed with lasix (initialy IV and now PO). Her anemia treated with iron infusions during hospitalization. Her last HgB was 10 (yesterday) and she has had an upwards trend since her transfusion on the 26th. Will continue to monitor HgB. Pt continues to refuse EGD and colonoscopy at this time. Patient is still feeling significant fatigue and weakness, and will continue PT and OT assessment today.  Anticipate home on 6/2/23 with home cares and oxygen with activity.     Atrial Flutter/Atrial Fibrillation  - Continue digoxin, metoprolol, diltiazem, and apixiban  -Continuous Cardiac Monitoring  -outpatient follow up with atrial fibrillation clinic.     Aortic  Stenosis  - Was noted on an ECHO on May 25  - Pt will f/u with cardiology to discuss TAVR    Generalized Weakness  - Per OT: Rec home OT eval for safety.   - Pt has PCA and family support at home.   - care management evaluation today to confirm home care needs.     COPD Exacerbation  - Completed steroid during hospitalization.  - Completed home oxygen assessment.   - Continue every day Breo Ellipta 1 puff    - Continue prn albuterol 2 puffs every 4 hr    Chronic Iron Deficiency Anemia suspected occult blood loss.   On long term anticoagulation.   - Last transfusion was on May 26  - Continue IV Iron  - repeat CBC in a.m.  - Pt refused GI w/u, but ageeable to f/u with hematology given chronicity  - hematology does not feel oral supplement warranted at discharge.     HFpEF and Pleural Effusion  - Last thoracentesis was in Jan. Given anemia, however, is being managed with diuresis. (IV diuresis on 5/27 and 5/28. Transitioned to PO on 5/29.)    Hyperlipidemia  - Continue atorvastatin     Diabetes Mellitus Type 2 and Diabetic Neuropathy  - HgB A1C 6.1 on 05/25  - Continue insulin, gabapentin, and Empagoflozin  - Restart metformin at discharge    Hypomagnesemia  - continue to supplement Mg    Dizziness  - utilize prn eclizine     Tobacco Use  - encourage cessation and continue nicotine inhaler    Fibromyalgia  - continue oxycodone        Diet: Combination Diet Low Saturated Fat Na <2400mg Diet, No Caffeine Diet    DVT Prophylaxis:Apixiban  Chairez Catheter: Not present  Lines: None     Cardiac Monitoring: None  Code Status: Full Code      Clinically Significant Risk Factors              # Hypoalbuminemia: Lowest albumin = 3 g/dL at 5/26/2023  1:07 PM, will monitor as appropriate     # Hypertension: Noted on problem list         # Moderate Malnutrition: based on nutrition assessment         Disposition Plan      Expected Discharge Date: 06/01/2023    Discharge Delays: Oxygen Needs - Arrange Home O2  PT New Consult  needed  Destination: home with family;home with help/services  Discharge Comments: Needs PT eval and OT is recommending Home OT        The patient's care was discussed with the Attending Physician, Dr. Hartmann .    THAIS Hernandez-S2  Hospitalist Service  [facility]  Securely message with [employer] (more info)  Text page via [employer] Paging/Directory   ______________________________________________________________________    Interval History   Vitals remained stable. No overnight events.     Physical Exam   Vital Signs: Temp: 97.8  F (36.6  C) Temp src: Oral BP: 101/56 Pulse: 88   Resp: 20 SpO2: 97 % O2 Device: Nasal cannula Oxygen Delivery: 2.5 LPM  Weight: 138 lbs 1.6 oz    Constitutional: awake, alert and no apparent distress  Respiratory: No increased work of breathing, good air exchange, clear to auscultation bilaterally, no crackles or wheezing  Cardiovascular: Normal apical impulse, regular rate and rhythm, normal S1 and S2, no S3 or S4, and no murmur noted  GI: No scars, normal bowel sounds, soft, non-distended, non-tender, no masses palpated, no hepatosplenomegally  Neurologic: Awake, alert, oriented to name, place and time.     Medical Decision Making     60 MINUTES SPENT BY ME on the date of service doing chart review, history, exam, documentation & further activities per the note.      Data     I have personally reviewed the following data over the past 24 hrs:    7.6  \   10.0 (L)   / 253     N/A N/A N/A /  132 (H)   4.0 N/A N/A \       Imaging results reviewed over the past 24 hrs:   No results found for this or any previous visit (from the past 24 hour(s)).

## 2023-06-01 NOTE — PROGRESS NOTES
Physical Therapy Discharge Summary    Reason for therapy discharge:    Discharged to home.  All goals and outcomes met, no further needs identified.    Progress towards therapy goal(s). See goals on Care Plan in [facility] electronic health record for goal details.  Goals met    Therapy recommendation(s):    Continue home exercise program.

## 2023-06-01 NOTE — PLAN OF CARE
Goal Outcome Evaluation:      Plan of Care Reviewed With: patient  Patient is alert and oriented and able to communicate her needs to staff. She has been up in the recliner chair for ost of the shift. Patient is able to transfer on and off the bedside commode without any problems. Patient had 2 bowel movements and is voiding urine without any problems. Patient denies any c/o pain. Blood sugars were 132 and 127. Patient continues to requires supplemental oxygen at 2.5 LPM. This is her baseline at home. Will continue to monitor.

## 2023-06-01 NOTE — PROGRESS NOTES
"[facility] Hematology and Oncology Inpatient Progress Note    Patient: Mary Kay Tejada  MRN: [id number]  Date of Service: June 1, 2023         Reason for Visit    #.  Severe microcytic anemia    Assessment and Plan     Cancer Staging   No matching staging information was found for the patient.    #.  Chronic iron deficiency anemia suspected occult blood loss  #.  On long-term anticoagulant use  #.  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  Records need to be verified.     Post 1 unit of packed RBC transfusion (5/26/2023) and IV Venofer 300 mg (5/28/2023 and 5/31/2023). It was about 50 ml of IV venofer in the bag from yesterday. I discussed with SALVADOR Lozano and she will complete the rest of the infusion today.    She decided to hold off EGD.   Ok not to take oral iron   Will follow up as outpatient.    Discussed with Dr. France, and a RN.   ______________________________________________________________________________    History of Present Illness    Overnight events and notes were reviewed.  She reported feeling tired. She had IV iron yesterday and no intolerable side effects.      Review of Systems    Apart from describing in HPI, the remainder of comprehensive ROS was negative.    Physical Exam    /59 (BP Location: Right arm)   Pulse 89   Temp 97.6  F (36.4  C) (Oral)   Resp 20   Ht 1.676 m (5' 6\")   Wt 62.6 kg (138 lb 1.6 oz)   SpO2 99%   BMI 22.29 kg/m      General: alert, awake, not in acute distress  HEENT: Head: Normal, normocephalic, atraumatic.  Eye: Normal external eye, conjunctiva, lids cornea, TIM.  Pharynx: Normal buccal mucosa. Normal pharynx.  Neck / Thyroid: Supple, no masses, nodes, nodules or enlargement.  Lymphatics: No abnormally enlarged lymph nodes.  Chest: Normal chest wall and respirations. Clear to auscultation.  Abdomen: abdomen is soft without significant tenderness, masses, organomegaly or guarding  Extremities: normal strength, tone, and muscle mass  Skin: normal. no rash or " abnormalities  CNS: non focal.     Lab Results    Recent Results (from the past 24 hour(s))   Glucose by meter    Collection Time: 23  5:10 PM   Result Value Ref Range    GLUCOSE BY METER POCT 103 (H) 70 - 99 mg/dL   Glucose by meter    Collection Time: 23  9:06 PM   Result Value Ref Range    GLUCOSE BY METER POCT 137 (H) 70 - 99 mg/dL   Potassium    Collection Time: 23  5:27 AM   Result Value Ref Range    Potassium 4.0 3.5 - 5.0 mmol/L   Magnesium    Collection Time: 23  5:27 AM   Result Value Ref Range    Magnesium 1.9 1.8 - 2.6 mg/dL   Glucose by meter    Collection Time: 23  6:49 AM   Result Value Ref Range    GLUCOSE BY METER POCT 132 (H) 70 - 99 mg/dL   Glucose by meter    Collection Time: 23 11:18 AM   Result Value Ref Range    GLUCOSE BY METER POCT 127 (H) 70 - 99 mg/dL        Imaging    Echocardiogram Complete    Result Date: 2023  [id number] [address]  Name: ALVAREZ HINDS MRN: [id number] : 1950 Study Date: 2023 01:44 PM Age: 73 yrs Gender: Female Patient Location: [facility] Reason For Study: Aflutter Ordering Physician: SYDNIE COLLADO Performed By: LING  BSA: 1.7 m2 Height: 65.5 in Weight: 143 lb HR: 82 BP: 114/62 mmHg ______________________________________________________________________________ Procedure Compared to the prior study dated 23, there are changes as noted. ______________________________________________________________________________ Interpretation Summary  Compared to the prior study dated 23, there are changes as noted. Aortic stenosis is now severe. The left ventricle is normal in size. Left ventricular function is normal.The ejection fraction is 60-65%. The right ventricle is mildly dilated. The left atrium is mildly dilated. Severe valvular aortic stenosis. There is mild to moderate (1-2+) aortic regurgitation. Small pericardial effusion   ______________________________________________________________________________ Left Ventricle The left ventricle is normal in size. Left ventricular function is normal.The ejection fraction is 60-65%. There is normal left ventricular wall thickness. Left ventricular diastolic function is abnormal. Diastolic Doppler findings (E/E' ratio and/or other parameters) suggest left ventricular filling pressures are increased. No regional wall motion abnormalities noted.  Right Ventricle The right ventricle is mildly dilated. The right ventricular systolic function is normal.  Atria The left atrium is mildly dilated. Right atrial size is normal. There is no color Doppler evidence of an atrial shunt.  Mitral Valve There is mild mitral annular calcification. Mitral valve leaflets appear normal. There is mild (1+) mitral regurgitation.  Tricuspid Valve The tricuspid valve is not well visualized, but is grossly normal. No tricuspid regurgitation.  Aortic Valve There is mild to moderate (1-2+) aortic regurgitation. Severe valvular aortic stenosis. Mean gradient of 47 mmHg, Peak velocity of 4.3 m/s, AV cuca DI 0.25.  Pulmonic Valve The pulmonic valve is not well visualized. This degree of valvular regurgitation is within normal limits.  Vessels The aorta root is normal. Normal size ascending aorta. IVC diameter <2.1 cm collapsing >50% with sniff suggests a normal RA pressure of 3 mmHg.  Pericardium Small pericardial effusion.  ______________________________________________________________________________ MMode/2D Measurements & Calculations RVDd: 4.0 cm IVSd: 0.67 cm LVIDd: 5.3 cm LVIDs: 3.5 cm LVPWd: 1.1 cm FS: 32.9 %  LV mass(C)d: 170.1 grams LV mass(C)dI: 98.2 grams/m2 Ao root diam: 2.6 cm LA dimension: 4.9 cm asc Aorta Diam: 2.7 cm LA/Ao: 1.9 LVOT diam: 2.1 cm LVOT area: 3.3 cm2 LA Volume (BP): 78.5 ml LA Volume Index (BP): 45.4 ml/m2  LA Volume Indexed (AL/bp): 52.5 ml/m2 RWT: 0.42  Doppler Measurements & Calculations MV E max  raji: 181.0 cm/sec MV A max raji: 54.0 cm/sec MV E/A: 3.4 MV max P.8 mmHg MV mean P.5 mmHg MV V2 VTI: 50.3 cm MVA(VTI): 1.6 cm2 MV P1/2t max raji: 236.2 cm/sec MV P1/2t: 54.1 msec MVA(P1/2t): 4.1 cm2 MV dec slope: 1279 cm/sec2 MV dec time: 0.13 sec Ao V2 max: 431.0 cm/sec Ao max P.0 mmHg Ao V2 mean: 325.6 cm/sec Ao mean P.8 mmHg Ao V2 VTI: 92.5 cm ADALBERTO(I,D): 0.89 cm2 ADALBERTO(V,D): 0.82 cm2 LV V1 max P.5 mmHg LV V1 max: 106.4 cm/sec LV V1 VTI: 24.8 cm SV(LVOT): 81.9 ml SI(LVOT): 47.3 ml/m2 PA V2 max: 124.9 cm/sec PA max P.2 mmHg PA mean PG: 3.5 mmHg PA V2 VTI: 25.1 cm AV Raji Ratio (DI): 0.25 ADALBERTO Index (cm2/m2): 0.51  E/E': 26.0 E/E' av.4 Lateral E/e': 24.8 Medial E/e': 26.0 Peak E' Raji: 7.0 cm/sec  ______________________________________________________________________________ Report approved by: Diamante Rubio 2023 03:53 PM          Signed by: Chandra Major MD

## 2023-06-02 ENCOUNTER — APPOINTMENT (OUTPATIENT)
Dept: RADIOLOGY | Facility: CLINIC | Age: 73
DRG: 308 | End: 2023-06-02
Attending: INTERNAL MEDICINE
Payer: COMMERCIAL

## 2023-06-02 ENCOUNTER — APPOINTMENT (OUTPATIENT)
Dept: ULTRASOUND IMAGING | Facility: CLINIC | Age: 73
DRG: 308 | End: 2023-06-02
Attending: INTERNAL MEDICINE
Payer: COMMERCIAL

## 2023-06-02 ENCOUNTER — APPOINTMENT (OUTPATIENT)
Dept: SPEECH THERAPY | Facility: CLINIC | Age: 73
DRG: 308 | End: 2023-06-02
Attending: INTERNAL MEDICINE
Payer: COMMERCIAL

## 2023-06-02 LAB
% LINING CELLS, BODY FLUID: 4 %
ALBUMIN SERPL-MCNC: 2.9 G/DL (ref 3.5–5)
ALBUMIN UR-MCNC: NEGATIVE MG/DL
ALP SERPL-CCNC: 98 U/L (ref 45–120)
ALT SERPL W P-5'-P-CCNC: 27 U/L (ref 0–45)
ANION GAP SERPL CALCULATED.3IONS-SCNC: 8 MMOL/L (ref 5–18)
APPEARANCE FLD: ABNORMAL
APPEARANCE UR: ABNORMAL
AST SERPL W P-5'-P-CCNC: 32 U/L (ref 0–40)
BACTERIA #/AREA URNS HPF: ABNORMAL /HPF
BASOPHILS # BLD AUTO: 0 10E3/UL (ref 0–0.2)
BASOPHILS NFR BLD AUTO: 0 %
BILIRUB SERPL-MCNC: 0.7 MG/DL (ref 0–1)
BILIRUB UR QL STRIP: NEGATIVE
BNP SERPL-MCNC: 136 PG/ML (ref 0–130)
BUN SERPL-MCNC: 30 MG/DL (ref 8–28)
CALCIUM SERPL-MCNC: 9.7 MG/DL (ref 8.5–10.5)
CELL COUNT BODY FLUID SOURCE: ABNORMAL
CHLORIDE BLD-SCNC: 95 MMOL/L (ref 98–107)
CO2 SERPL-SCNC: 37 MMOL/L (ref 22–31)
COLOR FLD: ABNORMAL
COLOR UR AUTO: ABNORMAL
CREAT SERPL-MCNC: 0.79 MG/DL (ref 0.6–1.1)
EOSINOPHIL # BLD AUTO: 0 10E3/UL (ref 0–0.7)
EOSINOPHIL NFR BLD AUTO: 0 %
ERYTHROCYTE [DISTWIDTH] IN BLOOD BY AUTOMATED COUNT: 25.4 % (ref 10–15)
GFR SERPL CREATININE-BSD FRML MDRD: 79 ML/MIN/1.73M2
GLUCOSE BLD-MCNC: 174 MG/DL (ref 70–125)
GLUCOSE BLDC GLUCOMTR-MCNC: 129 MG/DL (ref 70–99)
GLUCOSE BLDC GLUCOMTR-MCNC: 142 MG/DL (ref 70–99)
GLUCOSE BLDC GLUCOMTR-MCNC: 156 MG/DL (ref 70–99)
GLUCOSE BLDC GLUCOMTR-MCNC: 211 MG/DL (ref 70–99)
GLUCOSE BODY FLUID SOURCE: NORMAL
GLUCOSE FLD-MCNC: 160 MG/DL
GLUCOSE UR STRIP-MCNC: >1000 MG/DL
HCT VFR BLD AUTO: 33 % (ref 35–47)
HGB BLD-MCNC: 9.4 G/DL (ref 11.7–15.7)
HGB UR QL STRIP: ABNORMAL
IMM GRANULOCYTES # BLD: 0.2 10E3/UL
IMM GRANULOCYTES NFR BLD: 2 %
KETONES UR STRIP-MCNC: NEGATIVE MG/DL
LACTATE SERPL-SCNC: 1.6 MMOL/L (ref 0.7–2)
LACTATE SERPL-SCNC: 1.9 MMOL/L (ref 0.7–2)
LD BODY BODY FLUID SOURCE: NORMAL
LDH FLD L TO P-CCNC: 236 U/L
LDH SERPL L TO P-CCNC: 274 U/L (ref 125–220)
LEUKOCYTE ESTERASE UR QL STRIP: ABNORMAL
LYMPHOCYTES # BLD AUTO: 0.7 10E3/UL (ref 0.8–5.3)
LYMPHOCYTES NFR BLD AUTO: 6 %
LYMPHOCYTES NFR FLD MANUAL: 38 %
MAGNESIUM SERPL-MCNC: 1.9 MG/DL (ref 1.8–2.6)
MCH RBC QN AUTO: 22.1 PG (ref 26.5–33)
MCHC RBC AUTO-ENTMCNC: 28.5 G/DL (ref 31.5–36.5)
MCV RBC AUTO: 78 FL (ref 78–100)
MONOCYTES # BLD AUTO: 1.1 10E3/UL (ref 0–1.3)
MONOCYTES NFR BLD AUTO: 11 %
MONOS+MACROS NFR FLD MANUAL: 30 %
NEUTROPHILS # BLD AUTO: 8.3 10E3/UL (ref 1.6–8.3)
NEUTROPHILS NFR BLD AUTO: 81 %
NEUTS BAND NFR FLD MANUAL: 28 %
NITRATE UR QL: NEGATIVE
NRBC # BLD AUTO: 0 10E3/UL
NRBC BLD AUTO-RTO: 0 /100
PH BODY FLUID SOURCE: NORMAL
PH FLD: 8 PH
PH UR STRIP: 5 [PH] (ref 5–7)
PLATELET # BLD AUTO: 249 10E3/UL (ref 150–450)
POTASSIUM BLD-SCNC: 4.1 MMOL/L (ref 3.5–5)
PROCALCITONIN SERPL-MCNC: 0.62 NG/ML (ref 0–0.49)
PROT SERPL-MCNC: 6 G/DL (ref 6–8)
PROT SERPL-MCNC: 6.1 G/DL (ref 6–8)
RBC # BLD AUTO: 4.25 10E6/UL (ref 3.8–5.2)
RBC URINE: 1 /HPF
SODIUM SERPL-SCNC: 140 MMOL/L (ref 136–145)
SP GR UR STRIP: 1.02 (ref 1–1.03)
SQUAMOUS EPITHELIAL: 1 /HPF
UROBILINOGEN UR STRIP-MCNC: <2 MG/DL
WBC # BLD AUTO: 10.3 10E3/UL (ref 4–11)
WBC # FLD AUTO: 699 /UL
WBC URINE: 1 /HPF

## 2023-06-02 PROCEDURE — 87070 CULTURE OTHR SPECIMN AEROBIC: CPT | Performed by: INTERNAL MEDICINE

## 2023-06-02 PROCEDURE — 71045 X-RAY EXAM CHEST 1 VIEW: CPT

## 2023-06-02 PROCEDURE — 36415 COLL VENOUS BLD VENIPUNCTURE: CPT | Performed by: INTERNAL MEDICINE

## 2023-06-02 PROCEDURE — 82945 GLUCOSE OTHER FLUID: CPT | Performed by: INTERNAL MEDICINE

## 2023-06-02 PROCEDURE — 84478 ASSAY OF TRIGLYCERIDES: CPT | Performed by: INTERNAL MEDICINE

## 2023-06-02 PROCEDURE — 81001 URINALYSIS AUTO W/SCOPE: CPT | Performed by: INTERNAL MEDICINE

## 2023-06-02 PROCEDURE — 250N000013 HC RX MED GY IP 250 OP 250 PS 637: Performed by: INTERNAL MEDICINE

## 2023-06-02 PROCEDURE — 99207 PR NO CHARGE LOS: CPT | Performed by: INTERNAL MEDICINE

## 2023-06-02 PROCEDURE — P9045 ALBUMIN (HUMAN), 5%, 250 ML: HCPCS | Performed by: INTERNAL MEDICINE

## 2023-06-02 PROCEDURE — 87205 SMEAR GRAM STAIN: CPT | Performed by: INTERNAL MEDICINE

## 2023-06-02 PROCEDURE — 83615 LACTATE (LD) (LDH) ENZYME: CPT | Performed by: INTERNAL MEDICINE

## 2023-06-02 PROCEDURE — 84155 ASSAY OF PROTEIN SERUM: CPT | Performed by: INTERNAL MEDICINE

## 2023-06-02 PROCEDURE — 250N000011 HC RX IP 250 OP 636: Performed by: INTERNAL MEDICINE

## 2023-06-02 PROCEDURE — 83986 ASSAY PH BODY FLUID NOS: CPT | Performed by: INTERNAL MEDICINE

## 2023-06-02 PROCEDURE — 0W993ZZ DRAINAGE OF RIGHT PLEURAL CAVITY, PERCUTANEOUS APPROACH: ICD-10-PCS | Performed by: INTERNAL MEDICINE

## 2023-06-02 PROCEDURE — 99233 SBSQ HOSP IP/OBS HIGH 50: CPT | Performed by: INTERNAL MEDICINE

## 2023-06-02 PROCEDURE — 32555 ASPIRATE PLEURA W/ IMAGING: CPT

## 2023-06-02 PROCEDURE — 92526 ORAL FUNCTION THERAPY: CPT | Mod: GN | Performed by: SPEECH-LANGUAGE PATHOLOGIST

## 2023-06-02 PROCEDURE — 89051 BODY FLUID CELL COUNT: CPT | Performed by: INTERNAL MEDICINE

## 2023-06-02 PROCEDURE — 250N000013 HC RX MED GY IP 250 OP 250 PS 637: Performed by: STUDENT IN AN ORGANIZED HEALTH CARE EDUCATION/TRAINING PROGRAM

## 2023-06-02 PROCEDURE — 258N000003 HC RX IP 258 OP 636: Performed by: INTERNAL MEDICINE

## 2023-06-02 PROCEDURE — 87040 BLOOD CULTURE FOR BACTERIA: CPT | Performed by: INTERNAL MEDICINE

## 2023-06-02 PROCEDURE — 83605 ASSAY OF LACTIC ACID: CPT | Performed by: INTERNAL MEDICINE

## 2023-06-02 PROCEDURE — 120N000004 HC R&B MS OVERFLOW

## 2023-06-02 PROCEDURE — 92610 EVALUATE SWALLOWING FUNCTION: CPT | Mod: GN | Performed by: SPEECH-LANGUAGE PATHOLOGIST

## 2023-06-02 PROCEDURE — 80053 COMPREHEN METABOLIC PANEL: CPT | Performed by: INTERNAL MEDICINE

## 2023-06-02 PROCEDURE — 83735 ASSAY OF MAGNESIUM: CPT | Performed by: INTERNAL MEDICINE

## 2023-06-02 PROCEDURE — 85025 COMPLETE CBC W/AUTO DIFF WBC: CPT | Performed by: INTERNAL MEDICINE

## 2023-06-02 PROCEDURE — 83880 ASSAY OF NATRIURETIC PEPTIDE: CPT | Performed by: INTERNAL MEDICINE

## 2023-06-02 PROCEDURE — 84157 ASSAY OF PROTEIN OTHER: CPT | Performed by: INTERNAL MEDICINE

## 2023-06-02 PROCEDURE — 84145 PROCALCITONIN (PCT): CPT | Performed by: INTERNAL MEDICINE

## 2023-06-02 RX ORDER — ACETAMINOPHEN 325 MG/1
650 TABLET ORAL EVERY 4 HOURS PRN
Status: DISCONTINUED | OUTPATIENT
Start: 2023-06-02 | End: 2023-06-05 | Stop reason: HOSPADM

## 2023-06-02 RX ORDER — PIPERACILLIN SODIUM, TAZOBACTAM SODIUM 3; .375 G/15ML; G/15ML
3.38 INJECTION, POWDER, LYOPHILIZED, FOR SOLUTION INTRAVENOUS ONCE
Status: COMPLETED | OUTPATIENT
Start: 2023-06-02 | End: 2023-06-02

## 2023-06-02 RX ORDER — PIPERACILLIN SODIUM, TAZOBACTAM SODIUM 3; .375 G/15ML; G/15ML
3.38 INJECTION, POWDER, LYOPHILIZED, FOR SOLUTION INTRAVENOUS EVERY 8 HOURS
Status: DISCONTINUED | OUTPATIENT
Start: 2023-06-02 | End: 2023-06-05 | Stop reason: HOSPADM

## 2023-06-02 RX ORDER — PIPERACILLIN SODIUM, TAZOBACTAM SODIUM 3; .375 G/15ML; G/15ML
3.38 INJECTION, POWDER, LYOPHILIZED, FOR SOLUTION INTRAVENOUS EVERY 8 HOURS
Status: DISCONTINUED | OUTPATIENT
Start: 2023-06-02 | End: 2023-06-02

## 2023-06-02 RX ORDER — SODIUM CHLORIDE 9 MG/ML
INJECTION, SOLUTION INTRAVENOUS CONTINUOUS
Status: ACTIVE | OUTPATIENT
Start: 2023-06-02 | End: 2023-06-02

## 2023-06-02 RX ORDER — METOPROLOL TARTRATE 25 MG/1
25 TABLET, FILM COATED ORAL 2 TIMES DAILY
Status: DISCONTINUED | OUTPATIENT
Start: 2023-06-02 | End: 2023-06-05 | Stop reason: HOSPADM

## 2023-06-02 RX ORDER — MAGNESIUM OXIDE 400 MG/1
400 TABLET ORAL EVERY 4 HOURS
Status: COMPLETED | OUTPATIENT
Start: 2023-06-02 | End: 2023-06-02

## 2023-06-02 RX ADMIN — SUCRALFATE 1 G: 1 TABLET ORAL at 22:30

## 2023-06-02 RX ADMIN — APIXABAN 5 MG: 5 TABLET, FILM COATED ORAL at 08:27

## 2023-06-02 RX ADMIN — FUROSEMIDE 40 MG: 40 TABLET ORAL at 08:27

## 2023-06-02 RX ADMIN — PIPERACILLIN AND TAZOBACTAM 3.38 G: 3; .375 INJECTION, POWDER, LYOPHILIZED, FOR SOLUTION INTRAVENOUS at 03:01

## 2023-06-02 RX ADMIN — PIPERACILLIN AND TAZOBACTAM 3.38 G: 3; .375 INJECTION, POWDER, LYOPHILIZED, FOR SOLUTION INTRAVENOUS at 16:20

## 2023-06-02 RX ADMIN — EMPAGLIFLOZIN 10 MG: 10 TABLET, FILM COATED ORAL at 08:27

## 2023-06-02 RX ADMIN — PANTOPRAZOLE SODIUM 40 MG: 40 TABLET, DELAYED RELEASE ORAL at 08:27

## 2023-06-02 RX ADMIN — APIXABAN 5 MG: 5 TABLET, FILM COATED ORAL at 19:53

## 2023-06-02 RX ADMIN — Medication 400 MG: at 05:44

## 2023-06-02 RX ADMIN — SUCRALFATE 1 G: 1 TABLET ORAL at 08:27

## 2023-06-02 RX ADMIN — GABAPENTIN 600 MG: 600 TABLET, FILM COATED ORAL at 19:53

## 2023-06-02 RX ADMIN — DIGOXIN 125 MCG: 125 TABLET ORAL at 08:27

## 2023-06-02 RX ADMIN — SUCRALFATE 1 G: 1 TABLET ORAL at 16:20

## 2023-06-02 RX ADMIN — ACETAMINOPHEN 650 MG: 325 TABLET ORAL at 00:59

## 2023-06-02 RX ADMIN — ATORVASTATIN CALCIUM 20 MG: 10 TABLET, FILM COATED ORAL at 22:30

## 2023-06-02 RX ADMIN — METOPROLOL TARTRATE 25 MG: 25 TABLET, FILM COATED ORAL at 00:40

## 2023-06-02 RX ADMIN — Medication 400 MG: at 08:27

## 2023-06-02 RX ADMIN — SUCRALFATE 1 G: 1 TABLET ORAL at 12:53

## 2023-06-02 RX ADMIN — Medication 400 MG: at 01:29

## 2023-06-02 RX ADMIN — GABAPENTIN 600 MG: 600 TABLET, FILM COATED ORAL at 08:27

## 2023-06-02 RX ADMIN — PIPERACILLIN AND TAZOBACTAM 3.38 G: 3; .375 INJECTION, POWDER, LYOPHILIZED, FOR SOLUTION INTRAVENOUS at 08:26

## 2023-06-02 RX ADMIN — ALBUMIN HUMAN 25 G: 0.05 INJECTION, SOLUTION INTRAVENOUS at 17:17

## 2023-06-02 RX ADMIN — ALBUTEROL SULFATE 2 PUFF: 90 AEROSOL, METERED RESPIRATORY (INHALATION) at 08:28

## 2023-06-02 RX ADMIN — METOPROLOL TARTRATE 25 MG: 25 TABLET, FILM COATED ORAL at 08:28

## 2023-06-02 RX ADMIN — ACETAMINOPHEN 650 MG: 325 TABLET ORAL at 16:20

## 2023-06-02 RX ADMIN — GABAPENTIN 600 MG: 600 TABLET, FILM COATED ORAL at 12:53

## 2023-06-02 RX ADMIN — DILTIAZEM HYDROCHLORIDE 180 MG: 180 CAPSULE, EXTENDED RELEASE ORAL at 08:27

## 2023-06-02 RX ADMIN — METOPROLOL TARTRATE 25 MG: 25 TABLET, FILM COATED ORAL at 23:23

## 2023-06-02 RX ADMIN — FLUTICASONE FUROATE AND VILANTEROL TRIFENATATE 1 PUFF: 200; 25 POWDER RESPIRATORY (INHALATION) at 08:32

## 2023-06-02 ASSESSMENT — ACTIVITIES OF DAILY LIVING (ADL)
ADLS_ACUITY_SCORE: 37
ADLS_ACUITY_SCORE: 35
ADLS_ACUITY_SCORE: 37
ADLS_ACUITY_SCORE: 35
ADLS_ACUITY_SCORE: 35
ADLS_ACUITY_SCORE: 37
ADLS_ACUITY_SCORE: 33
ADLS_ACUITY_SCORE: 35
ADLS_ACUITY_SCORE: 35
ADLS_ACUITY_SCORE: 39

## 2023-06-02 NOTE — PLAN OF CARE
Problem: Malnutrition  Goal: Improved Nutritional Intake  Outcome: Progressing     Problem: Gas Exchange Impaired  Goal: Optimal Gas Exchange  Outcome: Progressing     Pt resting comfortably, independent in room. Up to commode at bedside. 2.5L NC at baseline. Plan to discharge home w/ PCA care tomorrow.

## 2023-06-02 NOTE — PROGRESS NOTES
"   06/02/23 0907   Appointment Info   Signing Clinician's Name / Credentials (SLP) Johnny Cabrera MA [facility] SLP   General Information   Onset of Illness/Injury or Date of Surgery 05/24/23   Referring Physician Mateo Hartmann DO   Patient/Family Therapy Goal Statement (SLP) None stated   Pertinent History of Current Problem Per provider notes: \"73 year old female with a hx of COPD, peripheral edema, DM, HTN, A-flutter with anticoagulation on apixiban, anemia, and fibromyalgia admitted on 5/24/2023 for COPD exacerbation, atrial flutter, R knee baker's cyst, L sided pleural effusion, and chronic iron deficiency anemia. Pt is receiving steroids, duonebs, and doxycycline for her COPD exacerbation. Metoprolol, digoxin, diltiazem, and apixiban are being given for atrial flutter for both anticoagulation and rate control. Empagoflozin was started for cardiovascular health and diabetes control. Her pleural effusion/HF is being diuresed with lasix (initialy IV and now PO). Her anemia treated with iron infusions during hospitalization. Her last HgB was 10 (yesterday) and she has had an upwards trend since her transfusion on the 26th. Will continue to monitor HgB. Pt continues to refuse EGD and colonoscopy at this time.\" \"...review with Dr Phalen (XR) findings on chest XR left side of lung,  are new compared to May 24 chest XR --retrocardiac consolidation left lung base with trace left pleural fluid, could be aspiration.\" Swallow evaluation completed this AM per provider orders.   General Observations Patient upright in bed, cooperative throughout. Reports no difficulties swallowing.   Type of Evaluation   Type of Evaluation Swallow Evaluation   Oral Motor   Oral Musculature generally intact   Dentition (Oral Motor)   Comment, Dentition (Oral Motor) Reports she does not wear her lower dentures.   Dentition (Oral Motor) dental appliance/dentures   Dental Appliance/Denture (Oral Motor) upper   Facial Symmetry (Oral Motor) "   Facial Symmetry (Oral Motor) WNL   Lip Function (Oral Motor)   Lip Range of Motion (Oral Motor) WNL   Lip Strength (Oral Motor) WNL   Tongue Function (Oral Motor)   Tongue Coordination/Speed (Oral Motor) WNL   Tongue ROM (Oral Motor) WNL   Jaw Function (Oral Motor)   Jaw Function (Oral Motor) WNL   Cough/Swallow/Gag Reflex (Oral Motor)   Volitional Throat Clear/Cough (Oral Motor) reduced strength   Volitional Swallow (Oral Motor) WNL   Vocal Quality/Secretion Management (Oral Motor)   Vocal Quality (Oral Motor) breathy   Secretion Management (Oral Motor) WNL;other (see comments)  (Reports feeling phlegm in her throat and is not able to cough it up.)   General Swallowing Observations   Past History of Dysphagia None per patient report or chart review.   Respiratory Support (General Swallowing Observations) nasal cannula;other (see comments)  (Reports 2L NC at baseline.)   Current Diet/Method of Nutritional Intake (General Swallowing Observations, NIS) thin liquids (level 0);regular diet   Swallowing Evaluation Clinical swallow evaluation   Clinical Swallow Evaluation   Feeding Assistance no assistance needed   Clinical Swallow Evaluation Textures Trialed thin liquids;pureed;solid foods   Clinical Swallow Eval: Thin Liquid Texture Trial   Mode of Presentation, Thin Liquids cup;straw;self-fed   Volume of Liquid or Food Presented >4 oz thin water   Oral Phase of Swallow WFL   Pharyngeal Phase of Swallow intact   Diagnostic Statement No overt s/s of aspiration observed. Patient with coughing before PO trials, but none after swallows of thin liquid.   Clinical Swallow Evaluation: Puree Solid Texture Trial   Mode of Presentation, Puree spoon;self-fed   Volume of Puree Presented 4 tsps   Oral Phase, Puree WFL   Pharyngeal Phase, Puree coughing/choking   Diagnostic Statement x1 episode of coughing ~5 seconds after swallow, patient reports this was agitating the phlegm in her throat.   Clinical Swallow Evaluation: Solid  Food Texture Trial   Mode of Presentation self-fed   Volume Presented 1 bryan cracker   Oral Phase other (see comments)  (Mildly extended mastication)   Pharyngeal Phase intact   Diagnostic Statement No overt s/s of aspiration observed.   Esophageal Phase of Swallow   Patient reports or presents with symptoms of esophageal dysphagia No   Swallowing Recommendations   Diet Consistency Recommendations thin liquids (level 0);regular diet   Supervision Level for Intake patient independent   Mode of Delivery Recommendations bolus size, small;slow rate of intake   Recommended Feeding/Eating Techniques (Swallow Eval) maintain upright sitting position for eating   Medication Administration Recommendations, Swallowing (SLP) As tolerated   Instrumental Assessment Recommendations   (Pending Progress)   General Therapy Interventions   Planned Therapy Interventions Dysphagia Treatment   Dysphagia treatment Instruction of safe swallow strategies   Clinical Impression   Criteria for Skilled Therapeutic Interventions Met (SLP Eval) Yes, treatment indicated   SLP Diagnosis Mild oral dysphagia   Risks & Benefits of therapy have been explained evaluation/treatment results reviewed;care plan/treatment goals reviewed;risks/benefits reviewed;current/potential barriers reviewed;participants voiced agreement with care plan;participants included;patient   Clinical Impression Comments   CSE completed today per provider orders. Patient presents with mild oral dysphagia in setting of lack of lower dentition. Concern for aspiration given suspected new pneumonia. Patient oral motor evaluation revealed no lower dentition, patient reports not eating with lower dentures. On 2L O2 currently and at baseline. Patient reports baseline coughing in the morning mostly, not related to PO intake, but decrease in coughing fx across the day. PO trials of thin liquids (straw, cup), puree textures, and hard solids completed. Oral phase c/b adequate bolus  acceptance and closure, extended mastication of hard solids, but good oral clearance of all intake. Pharyngeal phase c/b x1 episode of coughing following swallow of puree textures, however no other overt s/s of aspiration observed across the session. Patient reports coughing with puree was d/t agitation of phlegm in her throat with first solid texture trial. Patient did demonstrate coughing before any PO intake likely related to COPD exacerbation.     At this time, recommend continue regular textures and thin liquids with patient self-selecting softer solids. Patient should be fully upright and alert for all intake, taking small bites/sips, and using a slow rate of intake. SLP will continue to follow for dysphagia management. Instrumental swallow assessment pending overall progress.     SLP Total Evaluation Time   Eval: oral/pharyngeal swallow function, clinical swallow Minutes (32484) 12   SLP Discharge Planning   SLP Plan Diet tolerance, safe swallow strategies, determine need for VFSS f/u.   SLP Discharge Recommendation home with assist   SLP Rationale for DC Rec Suspect patient will meet all swallow goals at time of d/c

## 2023-06-02 NOTE — SIGNIFICANT EVENT
Significant Event Note - HR of 160s     Hospital D9     Recent CT -- Centrilobular emphysema and chronic bronchial wall thickening.  Moderate volume right pleural effusion with complete atelectasis of the basilar segments right lower lobe    Fever of 100.9 (+) cough and SOB    Critical aortic stenosis -- has Aflutter and HR in 160s - Already received BB tonight -- RN instructed to consult cardiology for guidance    Seen bedside     STAT - Blood cultures, CBC/Procal/CMP/lactic acid. Give tylenol as well     2AM - HR down to 80s but still goes to low 100s, better than before -- lactic acid is normal but  Procalcitonin up to 0.62. Soft Bps noted too.     I did review with Dr Phalen (XR) findings on chest XR left side of lung,  are new compared to May 24 chest XR --retrocardiac consolidation left lung base with trace left pleural fluid, could be aspiration.     With cough/SOB, fever, increased procalcitonin - will start antibiotic. Will inform AM doc.

## 2023-06-02 NOTE — PROGRESS NOTES
[facility]    Medicine Progress Note - Hospitalist Service    Date of Admission:  5/24/2023    Assessment & Plan   73 year old female with a hx of COPD, peripheral edema, DM, HTN, A-flutter with anticoagulation on apixiban, anemia, and fibromyalgia admitted on 5/24/2023 for COPD exacerbation, atrial flutter, R knee baker's cyst, L sided pleural effusion, and chronic iron deficiency anemia. Pt is receiving steroids, duonebs, and doxycycline for her COPD exacerbation. Metoprolol, digoxin, diltiazem, and apixiban are being given for atrial flutter for both anticoagulation and rate control. Empagoflozin was started for cardiovascular health and diabetes control. Her pleural effusion/HF is being diuresed with lasix (initialy IV and now PO). Her anemia treated with iron infusions during hospitalization. Her last HgB was 10 (yesterday) and she has had an upwards trend since her transfusion on the 26th. Will continue to monitor HgB. Pt continues to refuse EGD and colonoscopy at this time. Patient is still feeling significant fatigue and weakness, and will continue PT and OT assessment.  Patient developed diagnosed with suspected left lower left lung aspiration pneumonia.     Left lower lobe aspiration pneumonia.   Acute on chronic respiratory failure with hypoxia.   Sepsis secondary to pneumonia.   Right pleural effusion  CXR with retrocardiac consolidation left lung base with trace left pleural fluid.   procalcitonin 0.62.  Conservative fluid management given heart failure   - Order aspiration precautions.   - Supplemental oxygen as needed.   - Continue Zosyn 3.375 g IV q8 hours. (started 6/2/23-present).  - Order bedside swallow evaluation.  - Order diagnostic/therapeutic right US guided Thoracentesis.     COPD Exacerbation  - Completed steroid during hospitalization.  - Completed home oxygen assessment.   - Continue every day Breo Ellipta 1 puff    - Continue prn albuterol 2 puffs every 4 hr  -  pulmonary toileting.     Atrial Flutter/Atrial Fibrillation  - Continue digoxin, metoprolol, diltiazem, and apixiban  - Continuous Cardiac Monitoring  - doutpatient follow up with atrial fibrillation clinic.     HFpEF and Pleural Effusion  - Last thoracentesis was in Jan. Given anemia, however, is being managed with diuresis. (IV diuresis on 5/27 and 5/28. Transitioned to PO on 5/29.)    Aortic Stenosis  - Was noted on an ECHO on May 25  - Pt will f/u with cardiology to discuss TAVR    Generalized Weakness  - Per OT: Rec home OT eval for safety.   - Pt has PCA and family support at home.   - care management evaluation today to confirm home care needs.     Chronic Iron Deficiency Anemia suspected occult blood loss.   On long term anticoagulation.   - Last transfusion was on May 26  - Continue IV Iron  - repeat CBC in a.m.  - Pt refused GI w/u, but ageeable to f/u with hematology given chronicity  - hematology does not feel oral supplement warranted at discharge.       Hyperlipidemia  - Continue atorvastatin     Diabetes Mellitus Type 2 and Diabetic Neuropathy  - HgB A1C 6.1 on 05/25  - Continue insulin, gabapentin, and Empagoflozin  - Restart metformin at discharge    Hypomagnesemia  - continue to supplement Mg    Dizziness  - utilize prn meclizine     Tobacco Use  - encourage cessation and continue nicotine inhaler    Fibromyalgia   - continue oxycodone      Diet: Combination Diet Low Saturated Fat Na <2400mg Diet, No Caffeine Diet    DVT Prophylaxis: Pneumatic Compression Devices  Chairez Catheter: Not present  Lines: None     Cardiac Monitoring: None  Code Status: Full Code      Clinically Significant Risk Factors           # Hypercalcemia: corrected calcium is >10.1, will monitor as appropriate    # Hypoalbuminemia: Lowest albumin = 2.9 g/dL at 6/2/2023 12:57 AM, will monitor as appropriate     # Hypertension: Noted on problem list         # Moderate Malnutrition: based on nutrition assessment         Disposition  Plan      Expected Discharge Date: 06/02/2023    Discharge Delays: Oxygen Needs - Arrange Home O2  PT New Consult needed  Destination: home with family;home with help/services  Discharge Comments: Needs PT eval and OT is recommending Home OT pt refused EGD and Colonscopy          Mateo Hartmann,   Hospitalist Service  [facility]  Securely message with [employer] (more info)  Text page via [facility] Paging/Directory   ______________________________________________________________________    Interval History   Mary Kay developed fever (tmax 100.9 degrees) and increased cough, shortness of breath.  Patient denies known aspiration of food or emesis.  Denies chest pain, shortness of breath at this time.      Reviewed plan for US thoracentesis with patient and son (Heriberto).      Physical Exam   Vital Signs: Temp: 98.9  F (37.2  C) Temp src: Oral BP: 94/52 Pulse: 85   Resp: 20 SpO2: 95 % O2 Device: Nasal cannula Oxygen Delivery: 3 LPM  Weight: 138 lbs 0 oz    General Appearance: Thin elderly female, NAD.   Respiratory: No respiratory distress, diminished breath sounds on right.  No rales.   Cardiovascular: RRR no murmur, bruit, rub or gallop.  GI: Abdomen, non-distended, normoactive bowel sounds, non tender.   Skin: muliple bruises over upper extremities.  Tattoos noted.  No other lesions.   Other: Awake, alert and oriented x3.      Medical Decision Making     50 MINUTES SPENT BY ME on the date of service doing chart review, history, exam, documentation & further activities per the note.      Data     I have personally reviewed the following data over the past 24 hrs:    10.3  \   9.4 (L)   / 249     140 95 (L) 30 (H) /  142 (H)   4.1 37 (H) 0.79 \       ALT: 27 AST: 32 AP: 98 TBILI: 0.7   ALB: 2.9 (L) TOT PROTEIN: 6.1 LIPASE: N/A       Trop: N/A BNP: 136 (H)       Procal: 0.62 (H) CRP: N/A Lactic Acid: 1.6         Imaging results reviewed over the past 24 hrs:   Recent Results (from the past 24  hour(s))   XR Chest Port 1 View    Addendum: 6/2/2023    Addendum: Comparison with previous imaging 05/24/2023. The retrocardiac consolidation is new and could represent pneumonia. Results was discussed with Dr. Boucher      Narrative    EXAM: XR CHEST PORT 1 VIEW  LOCATION: [facility]  DATE/TIME: 6/2/2023 2:01 AM CDT    INDICATION: Cough and shortness of breath, fever, hospital day 9.  COMPARISON: None.      Impression    IMPRESSION: Cardiac enlargement. Moderate-sized right pleural effusion with compressive consolidation in the right lung base. Mild retrocardiac consolidation left lung base with trace left pleural fluid.

## 2023-06-02 NOTE — PROGRESS NOTES
73-year-old woman with left lower lobe aspiration pneumonia with sepsis with persistent pleural effusion.  Thoracentesis performed today.  Currently not feeling well with blood pressure 87/48.  No chest pain or respiratory difficulty with good O2 sats.  Lactate normal earlier today.  She was on diuretics until recently discontinued and I suspect that she may be volume depleted.  With some caution given her history of HFpEF, will gently give her some IV fluids with a total of 250 cc of normal saline over the next 2 hours and reassess.    Heriberto Harrington MD  Hospitalist

## 2023-06-03 ENCOUNTER — APPOINTMENT (OUTPATIENT)
Dept: RADIOLOGY | Facility: CLINIC | Age: 73
DRG: 308 | End: 2023-06-03
Attending: INTERNAL MEDICINE
Payer: COMMERCIAL

## 2023-06-03 ENCOUNTER — APPOINTMENT (OUTPATIENT)
Dept: SPEECH THERAPY | Facility: CLINIC | Age: 73
DRG: 308 | End: 2023-06-03
Payer: COMMERCIAL

## 2023-06-03 LAB
ANION GAP SERPL CALCULATED.3IONS-SCNC: 6 MMOL/L (ref 5–18)
BUN SERPL-MCNC: 24 MG/DL (ref 8–28)
CALCIUM SERPL-MCNC: 9.3 MG/DL (ref 8.5–10.5)
CHLORIDE BLD-SCNC: 97 MMOL/L (ref 98–107)
CO2 SERPL-SCNC: 37 MMOL/L (ref 22–31)
CREAT SERPL-MCNC: 0.77 MG/DL (ref 0.6–1.1)
ERYTHROCYTE [DISTWIDTH] IN BLOOD BY AUTOMATED COUNT: 26.1 % (ref 10–15)
GFR SERPL CREATININE-BSD FRML MDRD: 81 ML/MIN/1.73M2
GLUCOSE BLD-MCNC: 134 MG/DL (ref 70–125)
GLUCOSE BLDC GLUCOMTR-MCNC: 109 MG/DL (ref 70–99)
GLUCOSE BLDC GLUCOMTR-MCNC: 120 MG/DL (ref 70–99)
GLUCOSE BLDC GLUCOMTR-MCNC: 133 MG/DL (ref 70–99)
GLUCOSE BLDC GLUCOMTR-MCNC: 143 MG/DL (ref 70–99)
HCT VFR BLD AUTO: 30.1 % (ref 35–47)
HGB BLD-MCNC: 8.3 G/DL (ref 11.7–15.7)
L PNEUMO1 AG UR QL IA: NEGATIVE
MAGNESIUM SERPL-MCNC: 2 MG/DL (ref 1.8–2.6)
MCH RBC QN AUTO: 22.7 PG (ref 26.5–33)
MCHC RBC AUTO-ENTMCNC: 27.6 G/DL (ref 31.5–36.5)
MCV RBC AUTO: 82 FL (ref 78–100)
PLATELET # BLD AUTO: 213 10E3/UL (ref 150–450)
POTASSIUM BLD-SCNC: 3.7 MMOL/L (ref 3.5–5)
PROT FLD-MCNC: 3.1 G/DL
PROTEIN BODY FLUID SOURCE: NORMAL
RBC # BLD AUTO: 3.66 10E6/UL (ref 3.8–5.2)
S PNEUM AG SPEC QL: NEGATIVE
SODIUM SERPL-SCNC: 140 MMOL/L (ref 136–145)
WBC # BLD AUTO: 6.3 10E3/UL (ref 4–11)

## 2023-06-03 PROCEDURE — 250N000013 HC RX MED GY IP 250 OP 250 PS 637: Performed by: STUDENT IN AN ORGANIZED HEALTH CARE EDUCATION/TRAINING PROGRAM

## 2023-06-03 PROCEDURE — 250N000011 HC RX IP 250 OP 636: Performed by: INTERNAL MEDICINE

## 2023-06-03 PROCEDURE — 36415 COLL VENOUS BLD VENIPUNCTURE: CPT | Performed by: INTERNAL MEDICINE

## 2023-06-03 PROCEDURE — 87899 AGENT NOS ASSAY W/OPTIC: CPT | Performed by: INTERNAL MEDICINE

## 2023-06-03 PROCEDURE — 85014 HEMATOCRIT: CPT | Performed by: INTERNAL MEDICINE

## 2023-06-03 PROCEDURE — 250N000013 HC RX MED GY IP 250 OP 250 PS 637: Performed by: INTERNAL MEDICINE

## 2023-06-03 PROCEDURE — 82310 ASSAY OF CALCIUM: CPT | Performed by: INTERNAL MEDICINE

## 2023-06-03 PROCEDURE — 92526 ORAL FUNCTION THERAPY: CPT | Mod: GN

## 2023-06-03 PROCEDURE — 83735 ASSAY OF MAGNESIUM: CPT | Performed by: INTERNAL MEDICINE

## 2023-06-03 PROCEDURE — 99233 SBSQ HOSP IP/OBS HIGH 50: CPT | Performed by: INTERNAL MEDICINE

## 2023-06-03 PROCEDURE — 258N000003 HC RX IP 258 OP 636: Performed by: INTERNAL MEDICINE

## 2023-06-03 PROCEDURE — 120N000004 HC R&B MS OVERFLOW

## 2023-06-03 PROCEDURE — 71045 X-RAY EXAM CHEST 1 VIEW: CPT

## 2023-06-03 RX ORDER — MAGNESIUM OXIDE 400 MG/1
400 TABLET ORAL EVERY 4 HOURS
Status: DISCONTINUED | OUTPATIENT
Start: 2023-06-03 | End: 2023-06-03

## 2023-06-03 RX ORDER — MAGNESIUM SULFATE HEPTAHYDRATE 40 MG/ML
2 INJECTION, SOLUTION INTRAVENOUS ONCE
Status: COMPLETED | OUTPATIENT
Start: 2023-06-03 | End: 2023-06-03

## 2023-06-03 RX ORDER — POTASSIUM CHLORIDE 1500 MG/1
20 TABLET, EXTENDED RELEASE ORAL ONCE
Status: COMPLETED | OUTPATIENT
Start: 2023-06-03 | End: 2023-06-03

## 2023-06-03 RX ADMIN — IRON SUCROSE 300 MG: 20 INJECTION, SOLUTION INTRAVENOUS at 13:41

## 2023-06-03 RX ADMIN — PIPERACILLIN AND TAZOBACTAM 3.38 G: 3; .375 INJECTION, POWDER, LYOPHILIZED, FOR SOLUTION INTRAVENOUS at 01:00

## 2023-06-03 RX ADMIN — SUCRALFATE 1 G: 1 TABLET ORAL at 21:43

## 2023-06-03 RX ADMIN — ATORVASTATIN CALCIUM 20 MG: 10 TABLET, FILM COATED ORAL at 21:43

## 2023-06-03 RX ADMIN — APIXABAN 5 MG: 5 TABLET, FILM COATED ORAL at 08:02

## 2023-06-03 RX ADMIN — Medication 400 MG: at 08:02

## 2023-06-03 RX ADMIN — SUCRALFATE 1 G: 1 TABLET ORAL at 17:28

## 2023-06-03 RX ADMIN — PIPERACILLIN AND TAZOBACTAM 3.38 G: 3; .375 INJECTION, POWDER, LYOPHILIZED, FOR SOLUTION INTRAVENOUS at 17:28

## 2023-06-03 RX ADMIN — PIPERACILLIN AND TAZOBACTAM 3.38 G: 3; .375 INJECTION, POWDER, LYOPHILIZED, FOR SOLUTION INTRAVENOUS at 08:03

## 2023-06-03 RX ADMIN — GABAPENTIN 600 MG: 600 TABLET, FILM COATED ORAL at 21:43

## 2023-06-03 RX ADMIN — GABAPENTIN 600 MG: 600 TABLET, FILM COATED ORAL at 08:02

## 2023-06-03 RX ADMIN — POTASSIUM CHLORIDE 20 MEQ: 1500 TABLET, EXTENDED RELEASE ORAL at 08:02

## 2023-06-03 RX ADMIN — MAGNESIUM SULFATE HEPTAHYDRATE 2 G: 40 INJECTION, SOLUTION INTRAVENOUS at 08:07

## 2023-06-03 RX ADMIN — DILTIAZEM HYDROCHLORIDE 180 MG: 180 CAPSULE, EXTENDED RELEASE ORAL at 08:02

## 2023-06-03 RX ADMIN — PANTOPRAZOLE SODIUM 40 MG: 40 TABLET, DELAYED RELEASE ORAL at 08:02

## 2023-06-03 RX ADMIN — DIGOXIN 125 MCG: 125 TABLET ORAL at 08:02

## 2023-06-03 RX ADMIN — FLUTICASONE FUROATE AND VILANTEROL TRIFENATATE 1 PUFF: 200; 25 POWDER RESPIRATORY (INHALATION) at 08:20

## 2023-06-03 RX ADMIN — APIXABAN 5 MG: 5 TABLET, FILM COATED ORAL at 21:43

## 2023-06-03 RX ADMIN — METOPROLOL TARTRATE 25 MG: 25 TABLET, FILM COATED ORAL at 21:43

## 2023-06-03 RX ADMIN — METOPROLOL TARTRATE 25 MG: 25 TABLET, FILM COATED ORAL at 08:04

## 2023-06-03 RX ADMIN — EMPAGLIFLOZIN 10 MG: 10 TABLET, FILM COATED ORAL at 08:02

## 2023-06-03 RX ADMIN — SUCRALFATE 1 G: 1 TABLET ORAL at 08:02

## 2023-06-03 RX ADMIN — GABAPENTIN 600 MG: 600 TABLET, FILM COATED ORAL at 13:41

## 2023-06-03 RX ADMIN — SUCRALFATE 1 G: 1 TABLET ORAL at 12:12

## 2023-06-03 ASSESSMENT — ACTIVITIES OF DAILY LIVING (ADL)
ADLS_ACUITY_SCORE: 39

## 2023-06-03 NOTE — PROGRESS NOTES
Speech Language Therapy Discharge Summary    Reason for therapy discharge:    All goals and outcomes met, no further needs identified.    Progress towards therapy goal(s). See goals on Care Plan in [facility] electronic health record for goal details.  Goals met    Therapy recommendation(s):    No further therapy is recommended. Continue regular textures and thin liquids with patient self-selecting softer solids. Patient should be fully upright and alert for all intake, taking small bites/sips, and using a slow rate of intake.

## 2023-06-03 NOTE — PLAN OF CARE
Goal Outcome Evaluation: ongoing.       Plan of Care Reviewed With: patient    Overall Patient Progress: no changeOverall Patient Progress: no change     Pt alert and oriented. Denies chest pain or SOB. Up in chair most of night. Incontinent of urine. Denies pain. Pt on 2L-3L via NC overnight. BP remains on lower side. Anywhere between . Tele reads aflutter/afib with HR . Will continue to monitor.       Problem: Dysrhythmia  Goal: Normalized Cardiac Rhythm  Outcome: Progressing

## 2023-06-03 NOTE — PROGRESS NOTES
[facility]    Medicine Progress Note - Hospitalist Service    Date of Admission:  5/24/2023    Assessment & Plan   73 year old female with a hx of COPD, peripheral edema, DM, HTN, A-flutter with anticoagulation on apixiban, anemia, and fibromyalgia admitted on 5/24/2023 for COPD exacerbation, atrial flutter, R knee baker's cyst, L sided pleural effusion, and chronic iron deficiency anemia. Pt is receiving steroids, duonebs, and doxycycline for her COPD exacerbation. Metoprolol, digoxin, diltiazem, and apixiban are being given for atrial flutter for both anticoagulation and rate control. Empagoflozin was started for cardiovascular health and diabetes control. Her pleural effusion/HF is being diuresed with lasix (initialy IV and now PO). Her anemia treated with iron infusions during hospitalization. Her last HgB was 10 (yesterday) and she has had an upwards trend since her transfusion on the 26th. Will continue to monitor HgB. Pt continues to refuse EGD and colonoscopy at this time. Patient is still feeling significant fatigue and weakness, and will continue PT and OT assessment.  Patient developed diagnosed with suspected left lower left lung aspiration pneumonia.  Status post thoracentesis for moderate/large pleural effusion and meets criteria for exudative effusion.     Left lower lobe aspiration pneumonia.   Acute on chronic respiratory failure with hypoxia.   Sepsis secondary to pneumonia.   Right pleural effusion, exudative. Likely parapneumonic effusion, unspecified whether complicated pending culture results.   CXR with retrocardiac consolidation left lung base with trace left pleural fluid.   procalcitonin 0.62.  Conservative fluid management given heart failure   Diagnostic/therapeutic right US guided Thoracentesis completed 6/2/23, consistent with exudative effusion.   - aspiration precautions.   - Supplemental oxygen as needed.   - Continue Zosyn 3.375 g IV q8 hours. (started  6/2/23-present).  - bedside swallow continue regular diet, thin liquids.    - await pleural fluid studies, cultures pending.   - order Strep pneumo/Legionella urine antigen.     COPD Exacerbation  - Completed steroid during hospitalization.  - Completed home oxygen assessment.   - Continue every day Breo Ellipta 1 puff    - Continue prn albuterol 2 puffs every 4 hr  - pulmonary toileting.     Atrial Flutter/Atrial Fibrillation  - Continue digoxin, metoprolol, diltiazem, and apixiban  - Continuous Cardiac Monitoring  - doutpatient follow up with atrial fibrillation clinic.     HFpEF  - continue antihypertensive medications.   - holding furosemide for low blood pressure.     Aortic Stenosis  - Was noted on an ECHO on May 25  - Pt will f/u with cardiology to discuss TAVR    Generalized Weakness  - Per OT: Rec home OT eval for safety.   - Pt has PCA and family support at home.   - care management evaluation today to confirm home care needs.     Chronic Iron Deficiency Anemia suspected occult blood loss.   On long term anticoagulation.   - Last transfusion was on May 26  - Iron sucrose 300 mg IV q72 (2 of 3 doses completed.   - hemogobin stable at 8.3.   - Pt refused GI w/u, but ageeable to f/u with hematology given chronicity  - hematology does not feel oral supplement warranted at discharge.     Hyperlipidemia  - Continue atorvastatin     Diabetes Mellitus Type 2 and Diabetic Neuropathy  - HgB A1C 6.1 on 05/25  - Continue insulin, gabapentin, and Empagoflozin  - Restart metformin at discharge    Hypomagnesemia  - continue to supplement Mg    Dizziness  - utilize prn meclizine     Tobacco Use  - encourage cessation and continue nicotine inhaler    Fibromyalgia   - continue oxycodone      Diet: Combination Diet Low Saturated Fat Na <2400mg Diet, No Caffeine Diet    DVT Prophylaxis: DOAC  Chairez Catheter: Not present  Lines: None     Cardiac Monitoring: None  Code Status: Full Code      Clinically Significant Risk Factors            # Hypercalcemia: corrected calcium is >10.1, will monitor as appropriate    # Hypoalbuminemia: Lowest albumin = 2.9 g/dL at 6/2/2023 12:57 AM, will monitor as appropriate     # Hypertension: Noted on problem list         # Moderate Malnutrition: based on nutrition assessment         Disposition Plan      Expected Discharge Date: 06/04/2023    Discharge Delays: Oxygen Needs - Arrange Home O2  PT New Consult needed  Destination: home with family;home with help/services  Discharge Comments: Needs PT eval and OT is recommending Home OT pt refused EGD and Colonscopy  6/2 new pneumonia, IV antibiotics          Mateo Hartmann DO  Hospitalist Service  [facility]  Securely message with [employer] (more info)  Text page via [employer] Paging/Directory   ______________________________________________________________________    Interval History   Patient feels better after thoracentesis.  Still complains of shortness of breath.  No fever.     Physical Exam   Vital Signs: Temp: 98.3  F (36.8  C) Temp src: Oral BP: 95/54 Pulse: 68   Resp: 20 SpO2: 92 % O2 Device: Nasal cannula Oxygen Delivery: 3 LPM  Weight: 140 lbs 14.4 oz    General Appearance:  Thin elderly female, NAD.   Respiratory: No respiratory distress, CTAB bilaterally.  No rales.   Cardiovascular: RRR no murmur, bruit, rub or gallop.  GI: Abdomen, non-distended, normoactive bowel sounds, non tender.   Skin: muliple bruises over upper extremities.  Tattoos noted.  No other lesions.   Other:  Awake, alert and oriented x3.      Medical Decision Making     50 MINUTES SPENT BY ME on the date of service doing chart review, history, exam, documentation & further activities per the note.      Data     I have personally reviewed the following data over the past 24 hrs:    6.3  \   8.3 (L)   / 213     140 97 (L) 24 /  143 (H)   3.7 37 (H) 0.77 \       ALT: N/A AST: N/A AP: N/A TBILI: N/A   ALB: N/A TOT PROTEIN: 6.0 LIPASE: N/A       Procal: N/A  CRP: N/A Lactic Acid: 1.9       Ferritin:  N/A % Retic:  N/A LDH:  274 (H)       Imaging results reviewed over the past 24 hrs:   Recent Results (from the past 24 hour(s))   US Thoracentesis    Narrative    EXAM:   1. RIGHT THORACENTESIS  2. ULTRASOUND GUIDANCE  LOCATION: [facility]  DATE/TIME: 6/2/2023 2:38 PM CDT    INDICATION: Pleural effusion.    PROCEDURE: Informed consent obtained. Time out performed. The chest was prepped and draped in sterile fashion. 10 mL of 1 % lidocaine was infused into the local soft tissues. Under direct ultrasound guidance, a 5 Lao catheter system was placed into   the pleural effusion.     1.1 liters of clear yellow fluid were removed and sent to lab, if requested.    Patient tolerated procedure well.    Ultrasound imaging was obtained and placed in the patient's permanent medical record.      Impression    IMPRESSION:  Status post right ultrasound-guided thoracentesis.    Reference CPT Code: 99723

## 2023-06-04 PROBLEM — I95.9 HYPOTENSION, UNSPECIFIED HYPOTENSION TYPE: Status: ACTIVE | Noted: 2023-06-04

## 2023-06-04 PROBLEM — J90 PLEURAL EFFUSION: Status: ACTIVE | Noted: 2023-06-04

## 2023-06-04 LAB
CHYLO FLD QL: NORMAL
GLUCOSE BLDC GLUCOMTR-MCNC: 115 MG/DL (ref 70–99)
GLUCOSE BLDC GLUCOMTR-MCNC: 136 MG/DL (ref 70–99)
GLUCOSE BLDC GLUCOMTR-MCNC: 139 MG/DL (ref 70–99)
GLUCOSE BLDC GLUCOMTR-MCNC: 145 MG/DL (ref 70–99)
MAGNESIUM SERPL-MCNC: 2.4 MG/DL (ref 1.8–2.6)
POTASSIUM BLD-SCNC: 4.2 MMOL/L (ref 3.5–5)
TRIGL FLD-MCNC: 17 MG/DL

## 2023-06-04 PROCEDURE — 250N000013 HC RX MED GY IP 250 OP 250 PS 637: Performed by: INTERNAL MEDICINE

## 2023-06-04 PROCEDURE — 84132 ASSAY OF SERUM POTASSIUM: CPT | Performed by: INTERNAL MEDICINE

## 2023-06-04 PROCEDURE — 83735 ASSAY OF MAGNESIUM: CPT | Performed by: INTERNAL MEDICINE

## 2023-06-04 PROCEDURE — 120N000004 HC R&B MS OVERFLOW

## 2023-06-04 PROCEDURE — 99232 SBSQ HOSP IP/OBS MODERATE 35: CPT | Performed by: FAMILY MEDICINE

## 2023-06-04 PROCEDURE — 250N000011 HC RX IP 250 OP 636: Performed by: INTERNAL MEDICINE

## 2023-06-04 PROCEDURE — 250N000013 HC RX MED GY IP 250 OP 250 PS 637: Performed by: FAMILY MEDICINE

## 2023-06-04 PROCEDURE — 36415 COLL VENOUS BLD VENIPUNCTURE: CPT | Performed by: INTERNAL MEDICINE

## 2023-06-04 PROCEDURE — 250N000013 HC RX MED GY IP 250 OP 250 PS 637: Performed by: STUDENT IN AN ORGANIZED HEALTH CARE EDUCATION/TRAINING PROGRAM

## 2023-06-04 RX ORDER — FUROSEMIDE 20 MG
20 TABLET ORAL
Status: DISCONTINUED | OUTPATIENT
Start: 2023-06-04 | End: 2023-06-05 | Stop reason: HOSPADM

## 2023-06-04 RX ORDER — DILTIAZEM HYDROCHLORIDE 120 MG/1
120 CAPSULE, COATED, EXTENDED RELEASE ORAL DAILY
Status: DISCONTINUED | OUTPATIENT
Start: 2023-06-04 | End: 2023-06-05 | Stop reason: HOSPADM

## 2023-06-04 RX ADMIN — GABAPENTIN 600 MG: 600 TABLET, FILM COATED ORAL at 21:05

## 2023-06-04 RX ADMIN — APIXABAN 5 MG: 5 TABLET, FILM COATED ORAL at 08:25

## 2023-06-04 RX ADMIN — SUCRALFATE 1 G: 1 TABLET ORAL at 17:19

## 2023-06-04 RX ADMIN — METOPROLOL TARTRATE 25 MG: 25 TABLET, FILM COATED ORAL at 08:25

## 2023-06-04 RX ADMIN — PIPERACILLIN AND TAZOBACTAM 3.38 G: 3; .375 INJECTION, POWDER, LYOPHILIZED, FOR SOLUTION INTRAVENOUS at 08:25

## 2023-06-04 RX ADMIN — Medication 400 MG: at 08:25

## 2023-06-04 RX ADMIN — GABAPENTIN 600 MG: 600 TABLET, FILM COATED ORAL at 08:25

## 2023-06-04 RX ADMIN — DILTIAZEM HYDROCHLORIDE 120 MG: 120 CAPSULE, EXTENDED RELEASE ORAL at 08:25

## 2023-06-04 RX ADMIN — APIXABAN 5 MG: 5 TABLET, FILM COATED ORAL at 21:05

## 2023-06-04 RX ADMIN — FUROSEMIDE 20 MG: 20 TABLET ORAL at 08:25

## 2023-06-04 RX ADMIN — FUROSEMIDE 20 MG: 20 TABLET ORAL at 17:19

## 2023-06-04 RX ADMIN — SUCRALFATE 1 G: 1 TABLET ORAL at 21:04

## 2023-06-04 RX ADMIN — METOPROLOL TARTRATE 25 MG: 25 TABLET, FILM COATED ORAL at 21:04

## 2023-06-04 RX ADMIN — EMPAGLIFLOZIN 10 MG: 10 TABLET, FILM COATED ORAL at 08:25

## 2023-06-04 RX ADMIN — ATORVASTATIN CALCIUM 20 MG: 10 TABLET, FILM COATED ORAL at 21:04

## 2023-06-04 RX ADMIN — GABAPENTIN 600 MG: 600 TABLET, FILM COATED ORAL at 13:18

## 2023-06-04 RX ADMIN — SUCRALFATE 1 G: 1 TABLET ORAL at 13:18

## 2023-06-04 RX ADMIN — PANTOPRAZOLE SODIUM 40 MG: 40 TABLET, DELAYED RELEASE ORAL at 08:25

## 2023-06-04 RX ADMIN — FLUTICASONE FUROATE AND VILANTEROL TRIFENATATE 1 PUFF: 200; 25 POWDER RESPIRATORY (INHALATION) at 08:30

## 2023-06-04 RX ADMIN — PIPERACILLIN AND TAZOBACTAM 3.38 G: 3; .375 INJECTION, POWDER, LYOPHILIZED, FOR SOLUTION INTRAVENOUS at 17:22

## 2023-06-04 RX ADMIN — PIPERACILLIN AND TAZOBACTAM 3.38 G: 3; .375 INJECTION, POWDER, LYOPHILIZED, FOR SOLUTION INTRAVENOUS at 01:06

## 2023-06-04 RX ADMIN — SUCRALFATE 1 G: 1 TABLET ORAL at 08:25

## 2023-06-04 RX ADMIN — DIGOXIN 125 MCG: 125 TABLET ORAL at 08:25

## 2023-06-04 ASSESSMENT — ACTIVITIES OF DAILY LIVING (ADL)
ADLS_ACUITY_SCORE: 35
ADLS_ACUITY_SCORE: 39
ADLS_ACUITY_SCORE: 35
ADLS_ACUITY_SCORE: 39

## 2023-06-04 NOTE — CARE PLAN
Patient A&O x4, VSS. Aflutter on tele, HR in 60s. Denies pain, SOB, on 2L NC, O2sats in 90s. Up independently to bedside commode. Family at bedside. Uses call light appropriately.

## 2023-06-04 NOTE — PROGRESS NOTES
Goal Outcome Evaluation: ongoing.        Plan of Care Reviewed With: patient     Overall Patient Progress: no changeOverall Patient Progress: no change      Pt alert and oriented. Denies chest pain or SOB. Up in chair most of night. Incontinent of urine. Denies pain. Pt on 2.5L-3L via NC overnight. BP remains on lower side. Anywhere between 's pt asymptomatic. Tele reads aflutter/afib with HR 60-90. Purewick in overnight.  Will continue to monitor.         Problem: Dysrhythmia  Goal: Normalized Cardiac Rhythm  Outcome: Progressing

## 2023-06-04 NOTE — PROGRESS NOTES
Occupational Therapy Discharge Summary    Reason for therapy discharge:    Patient/family request discontinuation of services.    Progress towards therapy goal(s). See goals on Care Plan in Epic electronic health record for goal details.  Goals partially met.  Barriers to achieving goals:   limited OT sessions, pt reports that she is at her functional baseline like at home.    Therapy recommendation(s):    Continue home exercise program.

## 2023-06-04 NOTE — PLAN OF CARE
Major Shift Events:  Pt A/Ox4. VSS. Aflutter. 2L NC. Denies pain/SOB. Up to chair. Adequate I/O  Plan: Continue to monitor b/p's w reintroduction of lasix at reduced rate. Diltz also reduced.   For vital signs and complete assessments, please see documentation flowsheets.   Goal Outcome Evaluation:

## 2023-06-04 NOTE — PROVIDER NOTIFICATION
Pt refused therapy and recommendation of HODAN socks for BLE edema support. MD notified and aware

## 2023-06-04 NOTE — PROGRESS NOTES
[facility] MEDICINE  PROGRESS NOTE     Code Status: Full Code       Identification/Summary:   Mary Kay Tejada is a 73 year old female with a hx of COPD, nocturnal oxygen, peripheral edema, DM, HTN, A-flutter on apixiban, anemia, and fibromyalgia.  5/24 admitted  for COPD exacerbation, atrial flutter, R knee baker's cyst, L sided pleural effusion, and chronic iron deficiency anemia. Pt is receiving steroids, duonebs, and doxycycline for her COPD exacerbation. Metoprolol, digoxin, and diltiazem given for atrial flutter rate control. Empagoflozin started for cardiovascular health and diabetes control. Pleural effusion/HF is being diuresed with lasix (initialy IV and now PO). Anemia treated with iron infusions and PRBC x1 during hospitalization.  Hemoglobin ~9.  Will continue to monitor HgB. Pt continues to refuse EGD and colonoscopy at this time. Patient complaining of significant fatigue and weakness, and will declining PT and OT assessment.  6/2 diagnosed with suspected left lower left lung aspiration pneumonia.  Status post thoracentesis for moderate/large pleural effusion and meets criteria for exudative effusion.   6/4 having some hypotension.  Diltiazem and Lasix doses decreased.  If stable hopeful discharge 6/5.     Assessment and Plan:    Left lower lobe aspiration pneumonia.   Acute on chronic respiratory failure with hypoxia.   Sepsis secondary to pneumonia.   Right pleural effusion, exudative. Likely parapneumonic effusion.   CXR with retrocardiac consolidation left lung base with trace left pleural fluid.   procalcitonin 0.62.  Conservative fluid management given heart failure   Diagnostic/therapeutic right US guided Thoracentesis completed 6/2/23, consistent with exudative effusion.   - aspiration precautions.   - Supplemental oxygen as needed.  At baseline uses at night.  - Continue Zosyn 3.375 g IV q8 hours. (started 6/2/23-present).  - bedside swallow passed so  continue regular diet, thin liquids.    -Pleural fluid cultures negative.   -Negative strep pneumo/Legionella urine antigen.   COPD Exacerbation  - Completed steroid during hospitalization.  - Completed home oxygen assessment.   - Continue every day Breo Ellipta 1 puff    - Continue prn albuterol 2 puffs every 4 hr  - pulmonary toileting.   -At discharge may need to transition from nocturnal to continuous oxygen.  Hypotension  Atrial Flutter/Atrial Fibrillation  -Has been receiving digoxin, metoprolol, and diltiazem  - Continuous Cardiac Monitoring  -For hypotension decreased diltiazem dosing and encouraged placement of HODAN stockings which she has worn at home before.  -Check orthostatics  -Recommend outpatient follow up with atrial fibrillation clinic.   Acute on chronic HFpEF  - continue antihypertensive medications as above.   -Held furosemide for low blood pressure.   -6/4 Will resume Lasix at 20 mg twice daily.  -Follow BPs.  Aortic Stenosis  - Was noted on an ECHO on May 25  - Pt will f/u with cardiology to discuss TAVR  Generalized Weakness  - Per OT: Rec home OT eval for safety.   - Pt has PCA and family support at home.   - care management evaluations appreciated.   Chronic Iron Deficiency Anemia suspected occult blood loss.   On long term anticoagulation.   - Last transfusion was on May 26  - Iron sucrose 300 mg IV q72 x3 doses completed  - hemogobin stable at ~8.3.   - Pt refused EGD/colonoscopy but ageeable to f/u with hematology given chronicity  - hematology does not feel oral supplement warranted at discharge.   Hyperlipidemia  - Continue atorvastatin   Diabetes Mellitus Type 2 with diabetic neuropathy  - HgB A1C 6.1  - Continue insulin, gabapentin, and Empagoflozin  - Restart metformin at discharge  Hypomagnesemia  - continue to supplement Mg  Dizziness  - utilize prn meclizine   Tobacco Use  - encourage cessation and continue nicotine inhaler  Fibromyalgia   - continue oxycodone    Chronic  anticoagulation   Continue apixaban.    COVID-19 PCR/influenza A/B/RSV negative from 5/24/2023  Noted  Fluids: Saline lock  Pain meds: Tylenol and oxycodone as needed  Therapy: Home care services recommended  Chairez:Not present  Lines: None       Current Diet  Orders Placed This Encounter      Combination Diet Low Saturated Fat Na <2400mg Diet, No Caffeine Diet    Supplements  None    Barriers to Discharge: Monitor blood pressure after adjustments to diltiazem and Lasix    Disposition: Hopeful discharge 6/5 to home    Clinically Significant Risk Factors              # Hypoalbuminemia: Lowest albumin = 2.9 g/dL at 6/2/2023 12:57 AM, will monitor as appropriate     # Hypertension: Noted on problem list         # Moderate Malnutrition: based on nutrition assessment         Interval History/Subjective:  Patient doing well.  Hoping to discharge home tomorrow.  No chest pain.  No shortness of breath.  No nausea or vomiting.  Denies any lightheadedness or dizziness.  Has been declining working with therapy or wearing HODAN stockings.  After lengthy discussion she is now agreeable to wearing HODAN stockings as long as she does not get any more blood draws.  Declining EGD and colonoscopy.  Not enthusiastic about home care services.  Questions answered to verbalized satisfaction.    Physical Exam/Objective:  Temp:  [97.5  F (36.4  C)-99.4  F (37.4  C)] 98.4  F (36.9  C)  Pulse:  [62-72] 71  Resp:  [16-20] 20  BP: ()/(47-58) 123/58  SpO2:  [90 %-96 %] 93 %  Wt Readings from Last 4 Encounters:   06/04/23 62.9 kg (138 lb 11.1 oz)   02/09/23 67 kg (147 lb 11.3 oz)   02/06/23 65.3 kg (144 lb)   01/19/23 65.7 kg (144 lb 14.4 oz)     Body mass index is 22.39 kg/m .    Constitutional: awake, alert, cooperative, no apparent distress, appears older than stated age and mildly obese  ENT: Normocephalic, without obvious abnormality, atraumatic, external ears without lesions, oral pharynx with moist mucous membranes, tonsils without  erythema or exudates, gums normal and good dentition.  Respiratory: Very poor air or movement throughout but no rhonchi rales nor wheezing.  Cardiovascular: Normal apical impulse, regular rate and rhythm, normal S1 and S2, no S3 or S4, and no murmur noted  GI: No scars, normal bowel sounds, soft, non-distended, non-tender, no masses palpated, no hepatosplenomegally  Skin: normal skin color, texture, turgor, no redness, warmth, or swelling, and no rashes  Musculoskeletal: There is no redness, warmth, or swelling of the joints.  Motor strength is 5 out of 5 all extremities bilaterally.  Tone is normal.  1+ lower extremity edema bilaterally  Neurologic: Cranial nerves II-XII are grossly intact. Sensory:  Sensory intact  Neuropsychiatric: General: normal, calm and normal eye contact Level of consciousness: alert / normal Affect: normal Orientation: oriented to self, place, time and situation Memory and insight: normal, memory for past and recent events intact and thought process normal      Medications:   Personally Reviewed.  Medications     - MEDICATION INSTRUCTIONS -         apixaban ANTICOAGULANT  5 mg Oral BID     atorvastatin  20 mg Oral At Bedtime     digoxin  125 mcg Oral Daily     diltiazem ER COATED BEADS  120 mg Oral Daily     empagliflozin  10 mg Oral Daily     fluticasone-vilanterol  1 puff Inhalation Daily     furosemide  20 mg Oral BID     gabapentin  600 mg Oral TID     insulin aspart  1-7 Units Subcutaneous TID AC     insulin aspart  1-5 Units Subcutaneous At Bedtime     magnesium oxide  400 mg Oral Daily     metoprolol tartrate  25 mg Oral BID     nicotine   Transdermal Q8H     pantoprazole  40 mg Oral Daily     piperacillin-tazobactam  3.375 g Intravenous Q8H     sodium chloride (PF)  3 mL Intracatheter Q8H     sucralfate  1 g Oral 4x Daily AC & HS       Data reviewed today: I personally reviewed all new medications, labs, imaging/diagnostics reports over the past 24 hours. Pertinent findings  include:    Imaging:   No results found for this or any previous visit (from the past 24 hour(s)).    Labs:  XR Chest Port 1 View   Final Result   IMPRESSION: Decrease in size of a now small right pleural effusion with adjacent atelectasis. Unchanged retrocardiac opacity. Stable cardiomegaly. Trace left pleural effusion.      US Thoracentesis   Final Result   IMPRESSION:   Status post right ultrasound-guided thoracentesis.      Reference CPT Code: 41216      XR Chest Port 1 View   Final Result   Addendum (preliminary) 1 of 1   Addendum: Comparison with previous imaging 05/24/2023. The retrocardiac    consolidation is new and could represent pneumonia. Results was discussed    with Dr. Boucher      Final   IMPRESSION: Cardiac enlargement. Moderate-sized right pleural effusion with compressive consolidation in the right lung base. Mild retrocardiac consolidation left lung base with trace left pleural fluid.      Radiologist Consult For Cardiology   Final Result   Abnormal   IMPRESSION:     1.  Indeterminate 2.4 cm right middle lobe nodule.   2.  Centrilobular emphysema and chronic bronchial wall thickening.    3.  Moderate volume right pleural effusion with complete atelectasis of the basilar segments right lower lobe    4.  Please refer to cardiologist's dictation for the cardiac CT report.         [Access Center: Right middle lobe nodule. Pulmonary consult recommended.]      This report will be copied to the [facility] to ensure a provider acknowledges the finding. Access Center is available Monday through Friday 8am-3:30 pm.          Echocardiogram Complete   Final Result      Chest XR,  PA & LAT   Final Result   IMPRESSION:       There is a small to moderate right pleural effusion. Adjacent opacity in the basal right chest consistent with multisegment lower/middle lobe atelectasis, similar to February 2023. There is minimal pleural fluid in the left posterior costophrenic sulcus.      No new,  superimposed interstitial or alveolar lung opacities.      The lower right heart border remains silhouetted. No change in mildly enlarged cardiac silhouette. Vascular pedicle width is normal.      Degenerative osteophytes of the thoracic spine and right glenohumeral osteoarthrosis is again noted.      US Lower Extremity Venous Duplex Bilateral   Final Result   IMPRESSION:   1.  No deep venous thrombosis in the bilateral lower extremities.   2.  Mildly complex right popliteal fossa Baker's cyst.      CT Angiogram TAVR    (Results Pending)     Recent Results (from the past 24 hour(s))   Glucose by meter    Collection Time: 06/03/23 12:04 PM   Result Value Ref Range    GLUCOSE BY METER POCT 109 (H) 70 - 99 mg/dL   Legionella pneumophila antigen urine    Collection Time: 06/03/23  1:40 PM    Specimen: Urine, Midstream   Result Value Ref Range    Legionella pneumophila serogroup 1 urinary antigen Negative Negative   Streptococcus pneumoniae antigen    Collection Time: 06/03/23  1:40 PM    Specimen: Urine, Midstream   Result Value Ref Range    Streptococcus pneumoniae antigen Negative Negative   Glucose by meter    Collection Time: 06/03/23  4:48 PM   Result Value Ref Range    GLUCOSE BY METER POCT 133 (H) 70 - 99 mg/dL   Glucose by meter    Collection Time: 06/03/23  9:41 PM   Result Value Ref Range    GLUCOSE BY METER POCT 120 (H) 70 - 99 mg/dL   Magnesium    Collection Time: 06/04/23  4:48 AM   Result Value Ref Range    Magnesium 2.4 1.8 - 2.6 mg/dL   Potassium    Collection Time: 06/04/23  4:48 AM   Result Value Ref Range    Potassium 4.2 3.5 - 5.0 mmol/L   Glucose by meter    Collection Time: 06/04/23  6:54 AM   Result Value Ref Range    GLUCOSE BY METER POCT 115 (H) 70 - 99 mg/dL       Pending Labs:  Unresulted Labs Ordered in the Past 30 Days of this Admission     Date and Time Order Name Status Description    6/3/2023  6:35 AM Triglycerides Body Fluid with Reflex to Chylomicron Electrophoresis In process      6/2/2023  9:40 AM Pleural fluid Aerobic Bacterial Culture Routine with Gram Stain Preliminary     6/2/2023 12:20 AM Blood Culture Peripheral Blood Preliminary     6/2/2023 12:20 AM Blood Culture Peripheral Blood Preliminary             Timi Lyons MD  [facility]  Phone: #[phone]

## 2023-06-05 VITALS
DIASTOLIC BLOOD PRESSURE: 53 MMHG | RESPIRATION RATE: 17 BRPM | BODY MASS INDEX: 23.35 KG/M2 | SYSTOLIC BLOOD PRESSURE: 100 MMHG | HEART RATE: 57 BPM | HEIGHT: 66 IN | WEIGHT: 145.3 LBS | OXYGEN SATURATION: 94 % | TEMPERATURE: 98.3 F

## 2023-06-05 PROBLEM — J18.9 COMMUNITY ACQUIRED PNEUMONIA: Status: ACTIVE | Noted: 2023-06-05

## 2023-06-05 PROBLEM — I50.33 ACUTE ON CHRONIC DIASTOLIC HEART FAILURE (H): Status: ACTIVE | Noted: 2023-06-05

## 2023-06-05 LAB
GLUCOSE BLDC GLUCOMTR-MCNC: 114 MG/DL (ref 70–99)
GLUCOSE BLDC GLUCOMTR-MCNC: 122 MG/DL (ref 70–99)

## 2023-06-05 PROCEDURE — 250N000013 HC RX MED GY IP 250 OP 250 PS 637: Performed by: FAMILY MEDICINE

## 2023-06-05 PROCEDURE — 250N000011 HC RX IP 250 OP 636: Performed by: INTERNAL MEDICINE

## 2023-06-05 PROCEDURE — 250N000013 HC RX MED GY IP 250 OP 250 PS 637: Performed by: STUDENT IN AN ORGANIZED HEALTH CARE EDUCATION/TRAINING PROGRAM

## 2023-06-05 PROCEDURE — 250N000013 HC RX MED GY IP 250 OP 250 PS 637: Performed by: INTERNAL MEDICINE

## 2023-06-05 PROCEDURE — 99239 HOSP IP/OBS DSCHRG MGMT >30: CPT | Performed by: FAMILY MEDICINE

## 2023-06-05 RX ORDER — FLUTICASONE FUROATE AND VILANTEROL 200; 25 UG/1; UG/1
1 POWDER RESPIRATORY (INHALATION) DAILY
Qty: 28 EACH | Refills: 0 | Status: SHIPPED | OUTPATIENT
Start: 2023-06-05 | End: 2023-07-05

## 2023-06-05 RX ORDER — IPRATROPIUM BROMIDE AND ALBUTEROL SULFATE 2.5; .5 MG/3ML; MG/3ML
3 SOLUTION RESPIRATORY (INHALATION) EVERY 6 HOURS PRN
Qty: 90 ML | Refills: 0 | Status: SHIPPED | OUTPATIENT
Start: 2023-06-05 | End: 2023-12-26

## 2023-06-05 RX ORDER — DILTIAZEM HYDROCHLORIDE 120 MG/1
120 CAPSULE, COATED, EXTENDED RELEASE ORAL DAILY
Qty: 30 CAPSULE | Refills: 0 | Status: SHIPPED | OUTPATIENT
Start: 2023-06-06 | End: 2023-07-05

## 2023-06-05 RX ORDER — POTASSIUM CHLORIDE 1500 MG/1
20 TABLET, EXTENDED RELEASE ORAL DAILY
Qty: 30 TABLET | Refills: 0 | Status: SHIPPED | OUTPATIENT
Start: 2023-06-05 | End: 2023-07-07

## 2023-06-05 RX ORDER — METOPROLOL TARTRATE 25 MG/1
25 TABLET, FILM COATED ORAL 2 TIMES DAILY
Qty: 60 TABLET | Refills: 0 | Status: SHIPPED | OUTPATIENT
Start: 2023-06-05 | End: 2023-07-05

## 2023-06-05 RX ORDER — DIGOXIN 125 MCG
125 TABLET ORAL DAILY
Qty: 30 TABLET | Refills: 0 | Status: SHIPPED | OUTPATIENT
Start: 2023-06-06 | End: 2023-07-05

## 2023-06-05 RX ORDER — FUROSEMIDE 20 MG
20 TABLET ORAL
Qty: 60 TABLET | Refills: 0 | Status: SHIPPED | OUTPATIENT
Start: 2023-06-05 | End: 2023-07-07

## 2023-06-05 RX ADMIN — Medication 400 MG: at 08:52

## 2023-06-05 RX ADMIN — APIXABAN 5 MG: 5 TABLET, FILM COATED ORAL at 08:52

## 2023-06-05 RX ADMIN — GABAPENTIN 600 MG: 600 TABLET, FILM COATED ORAL at 08:52

## 2023-06-05 RX ADMIN — FUROSEMIDE 20 MG: 20 TABLET ORAL at 08:52

## 2023-06-05 RX ADMIN — PIPERACILLIN AND TAZOBACTAM 3.38 G: 3; .375 INJECTION, POWDER, LYOPHILIZED, FOR SOLUTION INTRAVENOUS at 00:24

## 2023-06-05 RX ADMIN — DIGOXIN 125 MCG: 125 TABLET ORAL at 08:54

## 2023-06-05 RX ADMIN — DILTIAZEM HYDROCHLORIDE 120 MG: 120 CAPSULE, EXTENDED RELEASE ORAL at 08:54

## 2023-06-05 RX ADMIN — SUCRALFATE 1 G: 1 TABLET ORAL at 08:52

## 2023-06-05 RX ADMIN — GABAPENTIN 600 MG: 600 TABLET, FILM COATED ORAL at 14:20

## 2023-06-05 RX ADMIN — METOPROLOL TARTRATE 25 MG: 25 TABLET, FILM COATED ORAL at 08:54

## 2023-06-05 RX ADMIN — SUCRALFATE 1 G: 1 TABLET ORAL at 12:34

## 2023-06-05 RX ADMIN — FLUTICASONE FUROATE AND VILANTEROL TRIFENATATE 1 PUFF: 200; 25 POWDER RESPIRATORY (INHALATION) at 08:53

## 2023-06-05 RX ADMIN — PANTOPRAZOLE SODIUM 40 MG: 40 TABLET, DELAYED RELEASE ORAL at 08:52

## 2023-06-05 RX ADMIN — EMPAGLIFLOZIN 10 MG: 10 TABLET, FILM COATED ORAL at 09:10

## 2023-06-05 ASSESSMENT — ACTIVITIES OF DAILY LIVING (ADL)
ADLS_ACUITY_SCORE: 39

## 2023-06-05 NOTE — PROGRESS NOTES
I certify that this patient, Mary Kay Tejada has been under my care (or a nurse practitioner or physican's assistant working with me). This is the face-to-face encounter for oxygen medical necessity.      At the time of this encounter supplemental oxygen is reasonable and necessary and is expected to improve the patient's condition in a home setting.       Patient has continued oxygen desaturation due to Chronic Heart Failure I50  COPD J44.9.    If portability is ordered, is the patient mobile within the home? Yes    Timi Lyons MD  6/5/2023  10:13 AM  [facility] Medicine Service  [phone]

## 2023-06-05 NOTE — PROGRESS NOTES
Care Management Follow Up    Length of Stay (days): 12    Expected Discharge Date: 06/05/2023     Concerns to be Addressed: discharge planning     Patient plan of care discussed at interdisciplinary rounds:yes    Anticipated Discharge Disposition: Home Care, Home     Anticipated Discharge Services: County Worker, Housekeeping/[employer], PCA  Anticipated Discharge DME: Oxygen      Education Provided on the Discharge Plan: yes   Patient/Family in Agreement with the Plan: yes    Referrals Placed by CM/SW:  [facility]      Additional Information:  Writer called multiple home care agencies in attempt to secure home care for RN, PT, OT and faxed info to [facility]    Also sent referral to to [facility].    Patient will discharge today.      Bri Haque CM

## 2023-06-05 NOTE — PLAN OF CARE
Goal: Optimal Comfort and Wellbeing  Outcome: Progressing     Problem: Dysrhythmia  Goal: Normalized Cardiac Rhythm  Outcome: Progressing    Problem: Gas Exchange Impaired  Goal: Optimal Gas Exchange  Outcome: Progressing     Pt A&O x4, denies pain. Is flipping from A-flutter to SR. VSS. On 2L NC. Pt resting, will continue to monitor.

## 2023-06-05 NOTE — PLAN OF CARE
Problem: Malnutrition  Goal: Improved Nutritional Intake  Outcome: Progressing     Problem: Gas Exchange Impaired  Goal: Optimal Gas Exchange  Outcome: Progressing     Pt resting comfortably, no events. States she is feeling better & ready for discharge. Tele continues to show A flutter. Denies SOB on 2L NC. BP remain WDL w/ med changes. Plan to discharge home tomorrow.

## 2023-06-05 NOTE — PLAN OF CARE
Goal Outcome Evaluation:      Problem: Plan of Care - These are the overarching goals to be used throughout the patient stay.    Goal: Readiness for Transition of Care  Outcome: Adequate for Care Transition  Patient is up with a standby assist. Tolerating activity well at this time. Does report some dyspnea with exertion. 2L of oxygen via nasal cannula - baseline for patient PTA. Patient's son brought portable oxygen for patient to wear home. Discharge teaching performed with patient and her son, who she lives with. Discussed the importance of follow-up care, as well as medication changes for patient. CHF information folder given to patient as well. Discharge information given, and no further questions at this time. Bridgett Covington RN

## 2023-06-05 NOTE — DISCHARGE SUMMARY
[facility] MEDICINE  DISCHARGE SUMMARY     Primary Care Physician: Cammy Bui  Admission Date: 5/24/2023   Discharge Provider: Timi Lyons MD Discharge Date: 6/5/2023    Diet:   Active Diet and Nourishment Order   Procedures     Combination Diet Low Saturated Fat Na <2400mg Diet, No Caffeine Diet     Diet     Code Status: Full Code   Activity: DCACTIVITY: Activity as tolerated  Home oxygen 2 L continuously      Condition at Discharge: Stable     REASON FOR PRESENTATION(See Admission Note for Details)   Shortness of breath    PRINCIPAL & ACTIVE DISCHARGE DIAGNOSES     Principal Problem:    Atrial flutter, unspecified type (H)  Active Problems:    Chronic pain syndrome    Paroxysmal Atrial Fibrillation    Fibromyalgia    Type 2 diabetes mellitus with hypoglycemia (H)    COPD exacerbation (H)    Hypoxia    SOB (shortness of breath)    Synovial cyst of knee, unspecified laterality    Hypotension, unspecified hypotension type    Pleural effusion    Acute on chronic diastolic heart failure (H)    Community acquired pneumonia  Left lower lobe aspiration pneumonia  Acute on chronic respiratory failure with hypoxia  Sepsis secondary to pneumonia  Right pleural effusion  Status post ultrasound thoracentesis 6/2/2023  COPD exacerbation  Atrial flutter  Acute on chronic diastolic heart failure  Aortic stenosis  Generalized weakness  Chronic iron deficiency suspected occult blood loss  Chronic anticoagulation  Hyperlipidemia  Type 2 diabetes  Hypomagnesemia  Tobacco use  Fibromyalgia    Clinically Significant Risk Factors              # Hypoalbuminemia: Lowest albumin = 2.9 g/dL at 6/2/2023 12:57 AM, will monitor as appropriate     # Hypertension: Noted on problem list         # Moderate Malnutrition: based on nutrition assessment         PENDING LABS     Unresulted Labs Ordered in the Past 30 Days of this Admission     Date and Time Order Name Status Description     6/2/2023  9:40 AM Pleural fluid Aerobic Bacterial Culture Routine with Gram Stain Preliminary     6/2/2023 12:20 AM Blood Culture Peripheral Blood Preliminary     6/2/2023 12:20 AM Blood Culture Peripheral Blood Preliminary         PROCEDURES ( this hospitalization only)      Ultrasound-guided thoracentesis 6/2/2023    RECOMMENDATIONS TO OUTPATIENT PROVIDER FOR F/U VISIT   Follow-up Appointments     Add follow up information to the AVS prior to printing      Follow-up and recommended labs and tests      1.  Follow-up with primary care provider in 3 to 5 days.  Recheck   hemoglobin, basic metabolic panel and oxygenation.  2.  Follow-up with pulmonary clinic within 1 month.  Recommend formal   PFTs.  3.  Outpatient sleep study.  4.  Follow-up with [facility] for outpatient EGD and colonoscopy.  Give   office phone number to patient.  5.  Follow-up with cardiology clinic/heart failure/atrial fibrillation and   aortic stenosis/TAVR further recommendations.  6.  If GI work-up unremarkable for source of anemia would recommend   follow-up with hematology.               DISPOSITION     Home with home care    SUMMARY OF HOSPITAL COURSE:      Mary Kay Tejada is a 73 year old female with a hx of COPD, nocturnal oxygen, peripheral edema, DM, HTN, A-flutter on apixiban, anemia, and fibromyalgia.  5/24 admitted for COPD exacerbation, atrial flutter, R knee baker's cyst, L sided pleural effusion, and chronic iron deficiency anemia.     1.  Pulmonary.  For the patient's COPD exacerbation is receiving steroids, duonebs, and doxycycline.  Pleural effusion was noted.  Underwent ultrasound-guided thoracentesis.  Findings consistent with exudative effusion.  No growth from cultures.  Patient requiring 2 L of nasal cannular oxygen continuously.  6/2 diagnosed with left lower lobe lung aspiration pneumonia.  Responding to intravenous Zosyn.  Will discharge on Augmentin.  Recommend outpatient follow-up with pulmonologist and sleep  study.      2.  Cardiac.  For atrial flutter patient's medications were adjusted and ultimately was effectively controlled with metoprolol, digoxin, and diltiazem. Empagoflozin started for cardiovascular health and diabetes control.   Intravenous diuresis utilized.  Patient was transitioned to oral Lasix at slightly lower dose due to increased antihypertensives at discharge.  HODAN stockings placed and encouraged to continue to wear at discharge.  Blood pressure well controlled.  No symptomatic orthostatics noted.      3.  Hematology. Anemia treated with iron infusions and PRBC x1 during hospitalization.  Hemoglobin ~9.  Will continue to monitor HgB. Pt refused EGD and colonoscopy at this time but at discharge states she is now willing to do as an outpatient.  We will follow-up with [facility].  If work-up is negative recommend seeing hematology.    4.  Generalized weakness. Patient complaining of significant fatigue and weakness, and has been declining PT and OT sessions.  Is agreeable to work with them at discharge so home care PT OT and RN ordered.      Otherwise medically stable.    Discharge Medications with Med changes:     Current Discharge Medication List      START taking these medications    Details   amoxicillin-clavulanate (AUGMENTIN) 875-125 MG tablet Take 1 tablet by mouth 2 times daily for 4 days  Qty: 8 tablet, Refills: 0    Associated Diagnoses: Community acquired pneumonia, unspecified laterality      digoxin (LANOXIN) 125 MCG tablet Take 1 tablet (125 mcg) by mouth daily  Qty: 30 tablet, Refills: 0    Associated Diagnoses: Acute on chronic diastolic heart failure (H)      empagliflozin (JARDIANCE) 10 MG TABS tablet Take 1 tablet (10 mg) by mouth daily  Qty: 30 tablet, Refills: 0    Associated Diagnoses: Acute on chronic diastolic heart failure (H); Type 2 diabetes mellitus with hypoglycemia without coma, without long-term current use of insulin (H)      metoprolol tartrate (LOPRESSOR) 25 MG tablet  Take 1 tablet (25 mg) by mouth 2 times daily  Qty: 60 tablet, Refills: 0    Associated Diagnoses: Paroxysmal atrial fibrillation (H)         CONTINUE these medications which have CHANGED    Details   diltiazem ER COATED BEADS (CARDIZEM CD/CARTIA XT) 120 MG 24 hr capsule Take 1 capsule (120 mg) by mouth daily  Qty: 30 capsule, Refills: 0    Associated Diagnoses: Paroxysmal atrial fibrillation (H)      fluticasone-vilanterol (BREO ELLIPTA) 200-25 MCG/ACT inhaler Inhale 1 puff into the lungs daily  Qty: 28 each, Refills: 0    Associated Diagnoses: COPD exacerbation (H)      furosemide (LASIX) 20 MG tablet Take 1 tablet (20 mg) by mouth 2 times daily  Qty: 60 tablet, Refills: 0    Associated Diagnoses: Acute on chronic diastolic heart failure (H)      ipratropium - albuterol 0.5 mg/2.5 mg/3 mL (DUONEB) 0.5-2.5 (3) MG/3ML neb solution Take 1 vial (3 mLs) by nebulization every 6 hours as needed for shortness of breath or wheezing  Qty: 90 mL, Refills: 0    Associated Diagnoses: COPD exacerbation (H)      potassium chloride ER (KLOR-CON M) 20 MEQ CR tablet Take 1 tablet (20 mEq) by mouth daily  Qty: 30 tablet, Refills: 0    Associated Diagnoses: Acute and chronic respiratory failure with hypercapnia (H)         CONTINUE these medications which have NOT CHANGED    Details   albuterol (PROAIR HFA/PROVENTIL HFA/VENTOLIN HFA) 108 (90 Base) MCG/ACT inhaler INHALE 2 PUFFS BY MOUTH EVERY 4 HOURS AS NEEDED FOR WHEEZING  Qty: 87.1 g, Refills: 1    Associated Diagnoses: Chronic obstructive pulmonary disease with acute lower respiratory infection (H)      apixaban ANTICOAGULANT (ELIQUIS) 5 MG tablet Take 1 tablet (5 mg) by mouth 2 times daily  Qty: 180 tablet, Refills: 2    Associated Diagnoses: Atrial fibrillation, unspecified type (H)      atorvastatin (LIPITOR) 20 MG tablet TAKE 1 TABLET(20 MG) BY MOUTH AT BEDTIME  Qty: 90 tablet, Refills: 2    Associated Diagnoses: Mixed hyperlipidemia      gabapentin (NEURONTIN) 600 MG tablet  "Take 1 tablet (600 mg) by mouth 3 times daily  Qty: 270 tablet, Refills: 2    Associated Diagnoses: Type 2 diabetes mellitus with hypoglycemia without coma, with long-term current use of insulin (H)      magnesium oxide (MAG-OX) 400 MG tablet Take 1 tablet (400 mg) by mouth daily  Qty: 30 tablet, Refills: 0    Associated Diagnoses: Atrial fibrillation, unspecified type (H)      meclizine (ANTIVERT) 25 MG tablet TAKE 1 TABLET(25 MG) BY MOUTH THREE TIMES DAILY AS NEEDED FOR DIZZINESS OR NAUSEA  Qty: 45 tablet, Refills: 5    Associated Diagnoses: Vertigo      nicotine (NICOTROL) 10 MG inhaler Use 1 cartridge as needed for urge to smoke by puffing over course of 20min.  Use 6-16 cart/day; reduce number of cart/day over 6-12 weeks.  Qty: 168 each, Refills: 1    Associated Diagnoses: Cigarette nicotine dependence without complication      nystatin (NYSTOP) powder [NYSTATIN (NYSTOP) POWDER] Apply 1 application topically 2 (two) times a day as needed. 2-3 times to affected area(s).  Qty: 60 g, Refills: 3    Associated Diagnoses: Candidal intertrigo      omeprazole (PRILOSEC) 20 MG DR capsule Take 20 mg by mouth daily      oxyCODONE IR (ROXICODONE) 10 MG tablet Take 1 tablet (10 mg) by mouth every 6 hours as needed for moderate to severe pain  Qty: 120 tablet, Refills: 0    Associated Diagnoses: Fibromyalgia; Chronic pain syndrome      sucralfate (CARAFATE) 1 GM tablet TAKE 1 TABLET BY MOUTH FOUR TIMES DAILY. CRUSH TABLET AND MIX IT WITH A LITTLE WATER THEN SWALLOW  Qty: 360 tablet, Refills: 3    Associated Diagnoses: Iron deficiency anemia due to chronic blood loss      ACCU-CHEK SOFTCLIX LANCETS lancets [ACCU-CHEK SOFTCLIX LANCETS LANCETS] TEST THREE TIMES DAILY  Qty: 300 each, Refills: 3    Associated Diagnoses: Type 2 diabetes mellitus with hyperglycemia (H)      BD ULTRA-FINE SHORT PEN NEEDLE 31 gauge x 5/16\" Ndle [BD ULTRA-FINE SHORT PEN NEEDLE 31 GAUGE X 5/16\" NDLE] TEST FOUR TIMES DAILY WITH MEALS AND AT " BEDTIME  Qty: 400 each, Refills: 3    Associated Diagnoses: DM (diabetes mellitus) (H)      !! blood-glucose meter (ONETOUCH VERIO IQ METER) Misc [BLOOD-GLUCOSE METER (ONETOUCH VERIO IQ METER) MISC] Check blood sugar three times a day.  Qty: 1 each, Refills: 0    Associated Diagnoses: Type 2 diabetes mellitus with hypoglycemia without coma, without long-term current use of insulin (H)      diaper,brief,adult,disposable (ADULT BRIEFS - LARGE) Misc [DIAPER,BRIEF,ADULT,DISPOSABLE (ADULT BRIEFS - LARGE) MISC] Use 3-4 daily as needed for incontinence  Qty: 120 each, Refills: 6    Associated Diagnoses: Mixed stress and urge urinary incontinence      generic lancets (FINGERSTIX LANCETS) [GENERIC LANCETS (FINGERSTIX LANCETS)] Dispense brand per patient's insurance at pharmacy discretion.  Qty: 300 each, Refills: 0    Comments: Test three times daily.  Associated Diagnoses: Type 2 diabetes mellitus with hyperglycemia, unspecified whether long term insulin use (H)      guaiFENesin-codeine (ROBITUSSIN AC) 100-10 MG/5ML solution Take 5-10 mLs by mouth every 4 hours as needed for cough  Qty: 120 mL, Refills: 0    Associated Diagnoses: COPD exacerbation (H)      naloxone (NARCAN) 4 MG/0.1ML nasal spray Spray 1 spray (4 mg) into one nostril alternating nostrils once as needed for opioid reversal every 2-3 minutes until assistance arrives  Qty: 0.2 mL, Refills: 0    Associated Diagnoses: Trigger point of thoracic region; Chronic pain syndrome      !! ONETOUCH VERIO IQ test strip USE TO TEST THREE TIMES DAILY  Qty: 300 strip, Refills: 1    Associated Diagnoses: Type 2 diabetes mellitus with hypoglycemia without coma, without long-term current use of insulin (H)       !! - Potential duplicate medications found. Please discuss with provider.      STOP taking these medications       metFORMIN (GLUCOPHAGE) 500 MG tablet Comments:   Reason for Stopping:                 Rationale for medication changes:      Augmentin for pneumonia.   Metoprolol digoxin and Jardiance for heart failure.  Diltiazem and Lasix dose decreased due to increasing other meds/Hypotension.  Metformin stopped with the initiation of Jardiance.      Consults     CARDIOLOGY IP CONSULT  CARE MANAGEMENT / SOCIAL WORK IP CONSULT  HEMATOLOGY & ONCOLOGY IP CONSULT  GASTROENTEROLOGY IP CONSULT  PHYSICAL THERAPY ADULT IP CONSULT  OCCUPATIONAL THERAPY ADULT IP CONSULT  SPEECH LANGUAGE PATH ADULT IP CONSULT      Immunizations given this encounter     Most Recent Immunizations   Administered Date(s) Administered     COVID-19 Bivalent 12+ (Pfizer) 09/27/2022     COVID-19 MONOVALENT 12+ (Pfizer) 11/22/2021     COVID-19 Monovalent 12+ (Pfizer 2022) 07/25/2022     DT (PEDS <7y) 03/01/2004     Flu, Unspecified 10/22/2008     HepA, Unspecified 08/03/2010     Influenza (High Dose) 3 valent vaccine 01/17/2020     Influenza Vaccine 65+ (Fluzone HD) 09/27/2022     Influenza Vaccine, 6+MO IM (QUADRIVALENT W/PRESERVATIVES) 09/19/2014     Pneumo Conj 13-V (2010&after) 07/22/2015     Pneumococcal 20 valent Conjugate (Prevnar 20) 12/27/2022     Pneumococcal 23 valent 10/19/2016     TD,PF 7+ (Tenivac) 08/16/2019     TDAP (Adacel,Boostrix) 03/11/2008     Td,adult,historic,unspecified 03/11/2008     Zoster recombinant adjuvanted (SHINGRIX) 08/16/2019     Zoster vaccine, live 07/22/2015           Anticoagulation Information      Recent INR results: No results for input(s): INR in the last 168 hours.  Warfarin doses (if applicable) or name of other anticoagulant: Eliquis      SIGNIFICANT IMAGING FINDINGS     Results for orders placed or performed during the hospital encounter of 05/24/23   Chest XR,  PA & LAT    Impression    IMPRESSION:     There is a small to moderate right pleural effusion. Adjacent opacity in the basal right chest consistent with multisegment lower/middle lobe atelectasis, similar to February 2023. There is minimal pleural fluid in the left posterior costophrenic sulcus.    No new,  superimposed interstitial or alveolar lung opacities.    The lower right heart border remains silhouetted. No change in mildly enlarged cardiac silhouette. Vascular pedicle width is normal.    Degenerative osteophytes of the thoracic spine and right glenohumeral osteoarthrosis is again noted.   US Lower Extremity Venous Duplex Bilateral    Impression    IMPRESSION:  1.  No deep venous thrombosis in the bilateral lower extremities.  2.  Mildly complex right popliteal fossa Baker's cyst.   Radiologist Consult For Cardiology   Result Value Ref Range    Radiologist flags (Urgent)      Right middle lobe nodule. Pulmonary consult recommended.    Impression    IMPRESSION:    1.  Indeterminate 2.4 cm right middle lobe nodule.  2.  Centrilobular emphysema and chronic bronchial wall thickening.   3.  Moderate volume right pleural effusion with complete atelectasis of the basilar segments right lower lobe   4.  Please refer to cardiologist's dictation for the cardiac CT report.      [[facility] Center: Right middle lobe nodule. Pulmonary consult recommended.]    This report will be copied to the [facility] to ensure a provider acknowledges the finding. [facility] is available Monday through Friday 8am-3:30 pm.      XR Chest Port 1 View    Impression    IMPRESSION: Cardiac enlargement. Moderate-sized right pleural effusion with compressive consolidation in the right lung base. Mild retrocardiac consolidation left lung base with trace left pleural fluid.   US Thoracentesis    Impression    IMPRESSION:  Status post right ultrasound-guided thoracentesis.    Reference CPT Code: 75074   XR Chest Port 1 View    Impression    IMPRESSION: Decrease in size of a now small right pleural effusion with adjacent atelectasis. Unchanged retrocardiac opacity. Stable cardiomegaly. Trace left pleural effusion.   Echocardiogram Complete   Result Value Ref Range    LVEF  60-65%        SIGNIFICANT LABORATORY FINDINGS     Most Recent 3  CBC's:Recent Labs   Lab Test 06/03/23  0615 06/02/23 0057 05/31/23  1114   WBC 6.3 10.3 7.6   HGB 8.3* 9.4* 10.0*   MCV 82 78 79    249 253     Most Recent 3 BMP's:Recent Labs   Lab Test 06/05/23  0727 06/04/23  2153 06/04/23  1647 06/04/23  0654 06/04/23  0448 06/03/23  0707 06/03/23  0615 06/02/23  0815 06/02/23 0057 05/31/23  0711 05/31/23  0550   NA  --   --   --   --   --   --  140  --  140  --  143   POTASSIUM  --   --   --   --  4.2  --  3.7  --  4.1   < > 4.0  4.0   CHLORIDE  --   --   --   --   --   --  97*  --  95*  --  97*   CO2  --   --   --   --   --   --  37*  --  37*  --  34*   BUN  --   --   --   --   --   --  24  --  30*  --  22   CR  --   --   --   --   --   --  0.77  --  0.79  --  0.63   ANIONGAP  --   --   --   --   --   --  6  --  8  --  12   JOEY  --   --   --   --   --   --  9.3  --  9.7  --  9.3   * 136* 145*   < >  --    < > 134*   < > 174*   < > 116    < > = values in this interval not displayed.     Most Recent 2 LFT's:Recent Labs   Lab Test 06/02/23 0057 05/26/23  1307   AST 32 21   ALT 27 24   ALKPHOS 98 112   BILITOTAL 0.7 0.9     Most Recent 3 INR's:Recent Labs   Lab Test 02/08/23  0759 01/18/23  0522 01/17/23  0445   INR 1.34* 1.35* 1.37*     Most Recent 3 BNP's:No lab results found.  Most Recent TSH and T4:Recent Labs   Lab Test 05/24/23  1015   TSH 1.17     Most Recent Hemoglobin A1c:Recent Labs   Lab Test 05/25/23  0507   A1C 6.1*     Most Recent 6 glucoses:Recent Labs   Lab Test 06/05/23  0727 06/04/23  2153 06/04/23  1647 06/04/23  1204 06/04/23  0654 06/03/23  2141   * 136* 145* 139* 115* 120*     Recent Results (from the past 1008 hour(s))   Troponin I (now)    Collection Time: 05/24/23  1:22 PM   Result Value Ref Range    Troponin I 0.07 0.00 - 0.29 ng/mL   Troponin I (now)    Collection Time: 05/24/23 10:15 AM   Result Value Ref Range    Troponin I 0.08 0.00 - 0.29 ng/mL     7-Day Micro Results     Collected Updated Procedure Result Status       06/03/2023 1340 06/03/2023 1841 Legionella pneumophila antigen urine [[id number]]   Urine, Midstream    Final result Component Value   Legionella pneumophila serogroup 1 urinary antigen Negative   Suggests no recent or current infection. Infection due to Legionella cannot be ruled out, since other serogroups and species may cause disease, antigen may not be present in urine in early infection, and the level of antigen present in the urine may be below detectable limits of the test.            06/03/2023 1340 06/03/2023 1842 Streptococcus pneumoniae antigen [[id number]]   Urine, Midstream    Final result Component Value   Streptococcus pneumoniae antigen Negative   A negative Streptococcus pneumoniae antigen result does not rule out infection with Streptococcus pneumoniae.            06/02/2023 1437 06/02/2023 1611 Cell count with differential fluid [[id number]]    (Abnormal)   Pleural fluid from Pleural Cavity, Right    Final result Component Value   No component results            06/02/2023 1437 06/02/2023 1532 Glucose fluid [[id number]]    Pleural fluid from Pleural Cavity, Right    Final result Component Value Units   Glucose Fluid Source Pleural Cavity, Right    Glucose fluid 160 mg/dL            06/02/2023 1437 06/02/2023 2235 Lactate dehydrogenase fluid [[id number]]    Pleural fluid from Pleural Cavity, Right    Final result Component Value Units   LD Fluid Source Pleural Cavity, Right    Lactate dehydrogenase fluid 236 U/L            06/02/2023 1437 06/03/2023 0645 Protein fluid [[id number]]    Pleural fluid from Pleural Cavity, Right    Final result Component Value Units   Protein Fluid Source Pleural Cavity, Right    Protein Total Fluid 3.1 g/dL            06/02/2023 1437 06/05/2023 0754 Pleural fluid Aerobic Bacterial Culture Routine with Gram Stain [[id number]]   Pleural fluid from Pleural Cavity, Right    Preliminary result Component Value   Culture No growth after 2 days  [P]    Gram  Stain Result No organisms seen  [P]     2+ WBC seen  [P]                06/02/2023 1437 06/02/2023 1536 pH fluid [[id number]]    Pleural fluid from Pleural Cavity, Right    Final result Component Value Units   pH Fluid Source Pleural Cavity, Right    pH Fluid 8.0 pH            06/02/2023 1437 06/02/2023 1611 Cell Count Body Fluid [[id number]]    (Abnormal)   Pleural fluid from Pleural Cavity, Right    Final result Component Value Units   Color Red    Clarity Cloudy    Cell Count Fluid Source Pleural Cavity, Right    Total Nucleated Cells 699 /uL            06/02/2023 1437 06/02/2023 1611 Differential Body Fluid [[id number]]    Pleural fluid from Pleural Cavity, Right    Final result Component Value Units   % Neutrophils 28 %   % Lymphocytes 38 %   % Monocyte/Macrophages 30 %   % Lining Cells 4 %            06/02/2023 0057 06/04/2023 1016 Blood Culture Peripheral Blood [[id number]]    Peripheral Blood    Preliminary result Component Value   Culture No growth after 2 days  [P]                06/02/2023 0056 06/04/2023 1016 Blood Culture Peripheral Blood [[id number]]   Peripheral Blood    Preliminary result Component Value   Culture No growth after 2 days  [P]                        Discharge Orders        Follow-Up with Cardiology      Follow-Up with Cardiology CECILIA      Medication Therapy Management Referral      Home care nursing referral      Reason for your hospital stay    Pneumonia, COPD, lung effusion, heart failure, worsening anemia     Activity    Your activity upon discharge: activity as tolerated     Monitor and record    Weigh yourself every morning     When to contact your care team    Contact your care team If symptoms get worse, If increased shortness of breath, If waking up at night with difficulty breathing, If unable to lie down for sleep due to symptoms, If weight gain of 2 pounds a day for 2 days in a row OR 5 pounds in 1 week., Increased swelling in your ankles or legs, and Dizziness or  lightheadedness     Discharge Follow Up - with Primary Care clinic within 3-5 days (RN to schedule prior to d/c for HE/[facility] primary care     Add follow up information to the AVS prior to printing     Discharge Instructions     Monitor and record    blood glucose 4 times a day, before meals and at bedtime.  Bring to follow-up appointment with primary.     Follow-up and recommended labs and tests    1.  Follow-up with primary care provider in 3 to 5 days.  Recheck hemoglobin, basic metabolic panel and oxygenation.  2.  Follow-up with pulmonary clinic within 1 month.  Recommend formal PFTs.  3.  Outpatient sleep study.  4.  Follow-up with [facility] for outpatient EGD and colonoscopy.  Give office phone number to patient.  5.  Follow-up with cardiology clinic/heart failure/atrial fibrillation and aortic stenosis/TAVR further recommendations.  6.  If GI work-up unremarkable for source of anemia would recommend follow-up with hematology.     Nebulizer and Supplies Order for DME - ONLY FOR DME    I, the undersigned, certify that the above prescribed supplies are medically necessary for this patient and is both reasonable and necessary in reference to accepted standards of medical and necessary in reference to accepted standards of medical practice in the treatment of this patient's condition and is not prescribed as a convenience.      Oxygen Adult/Peds    Oxygen Documentation  I certify that this patient, Mary Kay Tejada has been under my care (or a nurse practitioner or physican's assistant working with me). This is the face-to-face encounter for oxygen medical necessity.      At the time of this encounter supplemental oxygen is reasonable and necessary and is expected to improve the patient's condition in a home setting.       Patient has continued oxygen desaturation due to Chronic Heart Failure I50  COPD J44.9.    If portability is ordered, is the patient mobile within the home? yes     Diet    Follow this diet  upon discharge: Orders Placed This Encounter      Combination Diet Low Saturated Fat Na <2400mg Diet       Examination   Physical Exam   Temp:  [97.8  F (36.6  C)-99  F (37.2  C)] 97.8  F (36.6  C)  Pulse:  [57-69] 68  Resp:  [16-22] 16  BP: ()/(49-59) 111/55  SpO2:  [90 %-97 %] 95 %  Wt Readings from Last 4 Encounters:   06/05/23 65.9 kg (145 lb 4.8 oz)   02/09/23 67 kg (147 lb 11.3 oz)   02/06/23 65.3 kg (144 lb)   01/19/23 65.7 kg (144 lb 14.4 oz)       Constitutional: awake, alert, cooperative, no apparent distress, and appears stated age  Eyes: Lids and lashes normal, pupils equal, round and reactive to light, extra ocular muscles intact, sclera clear, conjunctiva normal  ENT: Normocephalic, without obvious abnormality, atraumatic, sinuses nontender on palpation, external ears without lesions, oral pharynx with moist mucous membranes, tonsils without erythema or exudates, gums normal and good dentition.  Respiratory: Poor airflow.  No rhonchi rales nor wheezing.  Cardiovascular: Normal apical impulse, regular rate and rhythm, normal S1 and S2, no S3 or S4, and no murmur noted  GI: No scars, normal bowel sounds, soft, non-distended, non-tender, no masses palpated, no hepatosplenomegally  Skin: normal skin color, texture, turgor, no redness, warmth, or swelling, and no rashes  Musculoskeletal: There is no redness, warmth, or swelling of the joints.  Full range of motion noted.  Motor strength is 5 out of 5 all extremities bilaterally.  Tone is normal.  1+ lower extremity edema bilaterally  Neurologic: Cranial nerves II-XII are grossly intact. Sensory:  Sensory intact  Neuropsychiatric: General: normal, calm and normal eye contact Level of consciousness: alert / normal Affect: normal Orientation: oriented to self, place, time and situation Memory and insight: normal, memory for past and recent events intact and thought process normal      Please see EMR for more detailed significant labs, imaging, consultant  notes etc.    I, Timi Lyons MD, personally saw the patient today and spent greater than 30 minutes discharging this patient.    Timi Lyons MD  [facility]    CC:Cammy Bui

## 2023-06-06 NOTE — PROGRESS NOTES
Care Management Discharge Note    Discharge Date: 06/05/2023       Discharge Disposition: Home Care, Home    Discharge Services: County Worker, Housekeeping/[employer], PCA    Discharge DME: Oxygen    Discharge Transportation: family or friend will provide    Does the patient's insurance plan have a 3 day qualifying hospital stay waiver?  Yes   Will the waiver be used for post-acute placement? No      Additional Information:  Patient discharged home 6/5/23 without firm home care RN, PT, OT all set arranged.    [facility] has accepted patient for home care.   Discharge orders sent [facility] and son Heriberto updated with name/number for home care agency,        Bri Haque CM

## 2023-06-07 ENCOUNTER — TELEPHONE (OUTPATIENT)
Dept: FAMILY MEDICINE | Facility: CLINIC | Age: 73
End: 2023-06-07
Payer: COMMERCIAL

## 2023-06-07 ENCOUNTER — MEDICAL CORRESPONDENCE (OUTPATIENT)
Dept: HEALTH INFORMATION MANAGEMENT | Facility: CLINIC | Age: 73
End: 2023-06-07
Payer: COMMERCIAL

## 2023-06-07 DIAGNOSIS — J44.0 CHRONIC OBSTRUCTIVE PULMONARY DISEASE WITH ACUTE LOWER RESPIRATORY INFECTION (H): ICD-10-CM

## 2023-06-07 LAB
BACTERIA BLD CULT: NO GROWTH
BACTERIA BLD CULT: NO GROWTH
BACTERIA PLR CULT: NO GROWTH
GRAM STAIN RESULT: NORMAL
GRAM STAIN RESULT: NORMAL

## 2023-06-07 RX ORDER — ALBUTEROL SULFATE 90 UG/1
AEROSOL, METERED RESPIRATORY (INHALATION)
Qty: 54 G | Refills: 1 | Status: SHIPPED | OUTPATIENT
Start: 2023-06-07 | End: 2023-08-16

## 2023-06-07 NOTE — TELEPHONE ENCOUNTER
MTM referral from: [facility] visit (referral by provider)    MTM referral outreach attempt #2 on June 7, 2023 at 9:34 AM      Outcome: Patient not reachable after several attempts, will route to [facility] Pharmacist/Provider as an FYI.  [facility] scheduling number is [phone].  Thank you for the referral.    Use [employer] the carrier/Plan on the flowsheet    Abhay Hurtado MT

## 2023-06-08 ENCOUNTER — MEDICAL CORRESPONDENCE (OUTPATIENT)
Dept: HEALTH INFORMATION MANAGEMENT | Facility: CLINIC | Age: 73
End: 2023-06-08
Payer: COMMERCIAL

## 2023-06-08 NOTE — TELEPHONE ENCOUNTER
"Last Written Prescription Date:5/20/2022   Last Fill Quantity: 87.1 g,  # refills:, R-1,    Last office visit provider:  04/25/23      Requested Prescriptions   Pending Prescriptions Disp Refills     albuterol (PROAIR HFA/PROVENTIL HFA/VENTOLIN HFA) 108 (90 Base) MCG/ACT inhaler [Pharmacy Med Name: ALBUTEROL HFA INH (200 PUFFS) 6.7GM] 87.1 g 1     Sig: INHALE 2 PUFFS BY MOUTH EVERY 4 HOURS AS NEEDED FOR WHEEZING       Asthma Maintenance Inhalers - Anticholinergics Passed - 6/7/2023 10:27 AM        Passed - Patient is age 12 years or older        Passed - Recent (12 mo) or future (30 days) visit within the authorizing provider's specialty     Patient has had an office visit with the authorizing provider or a provider within the authorizing providers department within the previous 12 mos or has a future within next 30 days. See \"Patient Info\" tab in inbasket, or \"Choose Columns\" in Meds & Orders section of the refill encounter.              Passed - Medication is active on med list       Short-Acting Beta Agonist Inhalers Protocol  Passed - 6/7/2023 10:27 AM        Passed - Patient is age 12 or older        Passed - Recent (12 mo) or future (30 days) visit within the authorizing provider's specialty     Patient has had an office visit with the authorizing provider or a provider within the authorizing providers department within the previous 12 mos or has a future within next 30 days. See \"Patient Info\" tab in inbasket, or \"Choose Columns\" in Meds & Orders section of the refill encounter.              Passed - Medication is active on med list             Claudia Ortega RN 06/07/23 8:21 PM  "

## 2023-06-13 ENCOUNTER — OFFICE VISIT (OUTPATIENT)
Dept: FAMILY MEDICINE | Facility: CLINIC | Age: 73
End: 2023-06-13
Payer: COMMERCIAL

## 2023-06-13 VITALS
OXYGEN SATURATION: 89 % | SYSTOLIC BLOOD PRESSURE: 112 MMHG | DIASTOLIC BLOOD PRESSURE: 64 MMHG | RESPIRATION RATE: 18 BRPM | HEART RATE: 64 BPM | TEMPERATURE: 97.7 F

## 2023-06-13 DIAGNOSIS — Z09 HOSPITAL DISCHARGE FOLLOW-UP: ICD-10-CM

## 2023-06-13 DIAGNOSIS — J44.1 COPD EXACERBATION (H): ICD-10-CM

## 2023-06-13 DIAGNOSIS — G89.4 CHRONIC PAIN SYNDROME: ICD-10-CM

## 2023-06-13 DIAGNOSIS — L30.9 DERMATITIS: ICD-10-CM

## 2023-06-13 DIAGNOSIS — I50.33 ACUTE ON CHRONIC DIASTOLIC HEART FAILURE (H): ICD-10-CM

## 2023-06-13 DIAGNOSIS — M54.6 TRIGGER POINT OF THORACIC REGION: Primary | ICD-10-CM

## 2023-06-13 DIAGNOSIS — E11.42 DIABETIC POLYNEUROPATHY ASSOCIATED WITH TYPE 2 DIABETES MELLITUS (H): ICD-10-CM

## 2023-06-13 DIAGNOSIS — M79.7 FIBROMYALGIA: ICD-10-CM

## 2023-06-13 DIAGNOSIS — I35.0 NONRHEUMATIC AORTIC VALVE STENOSIS: ICD-10-CM

## 2023-06-13 PROCEDURE — 99214 OFFICE O/P EST MOD 30 MIN: CPT | Mod: 25 | Performed by: FAMILY MEDICINE

## 2023-06-13 PROCEDURE — 20553 NJX 1/MLT TRIGGER POINTS 3/>: CPT | Performed by: FAMILY MEDICINE

## 2023-06-13 RX ORDER — TRIAMCINOLONE ACETONIDE 1 MG/G
CREAM TOPICAL 2 TIMES DAILY
Qty: 45 G | Refills: 1 | Status: SHIPPED | OUTPATIENT
Start: 2023-06-13 | End: 2024-01-01

## 2023-06-13 RX ORDER — OXYCODONE HYDROCHLORIDE 10 MG/1
10 TABLET ORAL EVERY 6 HOURS PRN
Qty: 120 TABLET | Refills: 0 | Status: SHIPPED | OUTPATIENT
Start: 2023-06-13 | End: 2023-07-12

## 2023-06-13 NOTE — PROGRESS NOTES
1. Hospital discharge follow-up  See below -     2. COPD exacerbation (H)  H/o COPD with excerbation - limits her breathing/increased her dyspnea -     3. Diabetic polyneuropathy associated with type 2 diabetes mellitus (H)  H/o diabetic polyneuropathy - stable - declines labs today     4. Fibromyalgia  Fibromyalgia with chronic pain syndrome - no increased use of medications/opiates.  Does get trigger injections to keep symptoms more controlled as well - due to underlying gastric ulcers/bleeding, she does not tolerate NSAIDs at all.    - oxyCODONE IR (ROXICODONE) 10 MG tablet; Take 1 tablet (10 mg) by mouth every 6 hours as needed for moderate to severe pain  Dispense: 120 tablet; Refill: 0    5. Chronic pain syndrome  As above   - oxyCODONE IR (ROXICODONE) 10 MG tablet; Take 1 tablet (10 mg) by mouth every 6 hours as needed for moderate to severe pain  Dispense: 120 tablet; Refill: 0    6. Trigger point of thoracic region  6 trigger points injected today -  - lidocaine 1 % 10 mL  Procedures  Procedure Note - Trigger Point Injections    Consent obtained: verbal    Indication:  Fibromyalgia/trigger point pain     6 trigger points identified.  Each trigger point swabbed x 3 with Betadine swab.  Each trigger point then injected with 2 ml of 1% Lidocaine (no epi).    Total volume injected:  10 ml    Complications:  None, patient tolerated procedure well.    Plan:  Discussed care with patient.  RTC for further injections in 30 days prn.        7. Dermatitis  Patient has some scaly dry skin on lower extremity - stasis dermatitis - will give Triamcinolone cream for symptom relief - to prevent further scratching.   - triamcinolone (KENALOG) 0.1 % external cream; Apply topically 2 times daily Apply topically sparingly BID to lower extremity rash  Dispense: 45 g; Refill: 1    8. Acute on chronic diastolic heart failure (H)  9. Nonrheumatic aortic valve stenosis  Recent hospitalization - feels better now  I have reviewed  available hospital records, consults, notes, discharge summary as well as imaging and lab results.  In addition I have reviewed her medication list and made any adjustments as indicated in orders.    Feeling better, but notes that she has to plan on valve replacement in future.     Carol Muñiz is a 73 year old, presenting for the following health issues:  Hospital F/U        6/13/2023     3:31 PM   Additional Questions   Roomed by Mimi     Last filled 4/26/2023         Hospital Follow-up Visit:    Hospital/Nursing Home/IP Rehab Facility: [facility]  Date of Admission: 5/24/23  Date of Discharge: 6/5/23  Reason(s) for Admission: Shortness of Breath    Was your hospitalization related to COVID-19? No   Problems taking medications regularly:  None  Medication changes since discharge: Started on abx, digoxin, jardiance and Metoprolol. Changed dosing on Diltiazem, Breo Ellipta, Lasix, Duoneb, Potassium. Stopped Metformin.  Problems adhering to non-medication therapy:  None    Summary of hospitalization:  [facility] discharge summary reviewed  Diagnostic Tests/Treatments reviewed.  Follow up needed: none  Other Healthcare Providers Involved in Patient s Care:         None  Update since discharge: improved.   Plan of care communicated with patient             Review of Systems   Constitutional: Positive for appetite change, fatigue and unexpected weight change. Negative for chills and fever.   HENT: Negative.    Eyes: Negative for visual disturbance.   Respiratory: Positive for cough and shortness of breath.    Cardiovascular: Positive for peripheral edema. Negative for chest pain.   Gastrointestinal: Negative for abdominal pain.   Endocrine: Negative for polydipsia and polyuria.   Genitourinary: Negative.    Musculoskeletal:        Chronic myalgias - with trigger point pain     Skin: Positive for rash.   Allergic/Immunologic: Negative.    Neurological: Positive for  weakness.   Psychiatric/Behavioral: Positive for mood changes.            Objective    /64 (BP Location: Left arm, Patient Position: Sitting, Cuff Size: Adult Small)   Pulse 64   Temp 97.7  F (36.5  C) (Temporal)   Resp 18   SpO2 (!) 89%   There is no height or weight on file to calculate BMI.  Physical Exam  Vitals reviewed.   Constitutional:       General: She is not in acute distress.     Appearance: She is ill-appearing.   HENT:      Head: Normocephalic.      Right Ear: Tympanic membrane, ear canal and external ear normal.      Left Ear: Tympanic membrane, ear canal and external ear normal.      Nose: Nose normal.      Mouth/Throat:      Mouth: Mucous membranes are moist.      Pharynx: No posterior oropharyngeal erythema.   Eyes:      Extraocular Movements: Extraocular movements intact.      Conjunctiva/sclera: Conjunctivae normal.      Pupils: Pupils are equal, round, and reactive to light.   Cardiovascular:      Rate and Rhythm: Normal rate and regular rhythm.      Pulses: Normal pulses.      Heart sounds: Murmur heard.   Pulmonary:      Effort: Pulmonary effort is normal.      Breath sounds: Normal breath sounds.   Abdominal:      Palpations: Abdomen is soft. There is no mass.      Tenderness: There is no abdominal tenderness. There is no guarding or rebound.   Musculoskeletal:         General: Tenderness (trigger point tenderness - especially at base of neck / upper thoracic) present. No deformity. Normal range of motion.      Cervical back: Normal range of motion and neck supple.   Lymphadenopathy:      Cervical: No cervical adenopathy.   Skin:     General: Skin is warm and dry.   Neurological:      General: No focal deficit present.      Mental Status: She is alert.   Psychiatric:         Mood and Affect: Mood normal.         Behavior: Behavior normal.            No results found for any visits on 06/13/23.

## 2023-06-16 ENCOUNTER — MEDICAL CORRESPONDENCE (OUTPATIENT)
Dept: HEALTH INFORMATION MANAGEMENT | Facility: CLINIC | Age: 73
End: 2023-06-16
Payer: COMMERCIAL

## 2023-06-19 DIAGNOSIS — I35.0 NONRHEUMATIC AORTIC VALVE STENOSIS: Primary | ICD-10-CM

## 2023-06-19 DIAGNOSIS — E78.2 MIXED HYPERLIPIDEMIA: ICD-10-CM

## 2023-06-19 RX ORDER — ATORVASTATIN CALCIUM 20 MG/1
TABLET, FILM COATED ORAL
Qty: 90 TABLET | Refills: 3 | Status: SHIPPED | OUTPATIENT
Start: 2023-06-19 | End: 2024-06-26

## 2023-06-19 ASSESSMENT — ENCOUNTER SYMPTOMS
COUGH: 1
SHORTNESS OF BREATH: 1
ALLERGIC/IMMUNOLOGIC NEGATIVE: 1
POLYDIPSIA: 0
FEVER: 0
WEAKNESS: 1
CHILLS: 0
APPETITE CHANGE: 1
FATIGUE: 1
ABDOMINAL PAIN: 0
UNEXPECTED WEIGHT CHANGE: 1

## 2023-06-19 NOTE — TELEPHONE ENCOUNTER
"Last Written Prescription Date:10/09/2022    Last Fill Quantity: 90,  # refills: 2   Last office visit provider:  06/13/2023     Requested Prescriptions   Pending Prescriptions Disp Refills     atorvastatin (LIPITOR) 20 MG tablet [Pharmacy Med Name: ATORVASTATIN 20MG TABLETS] 90 tablet 2     Sig: TAKE 1 TABLET(20 MG) BY MOUTH AT BEDTIME       Statins Protocol Passed - 6/19/2023 12:51 PM        Passed - LDL on file in past 12 months     Recent Labs   Lab Test 07/25/22  1026   LDL 53             Passed - No abnormal creatine kinase in past 12 months     No lab results found.             Passed - Recent (12 mo) or future (30 days) visit within the authorizing provider's specialty     Patient has had an office visit with the authorizing provider or a provider within the authorizing providers department within the previous 12 mos or has a future within next 30 days. See \"Patient Info\" tab in inbasket, or \"Choose Columns\" in Meds & Orders section of the refill encounter.              Passed - Medication is active on med list        Passed - Patient is age 18 or older        Passed - No active pregnancy on record        Passed - No positive pregnancy test in past 12 months             Mena Patel RN 06/19/23 12:51 PM  "

## 2023-06-20 ENCOUNTER — TELEPHONE (OUTPATIENT)
Dept: CARDIOLOGY | Facility: CLINIC | Age: 73
End: 2023-06-20

## 2023-06-20 NOTE — TELEPHONE ENCOUNTER
[facility] Call Center    Phone Message    May a detailed message be left on voicemail: yes     Reason for Call: Other: TAVR pt wanted to reschedule appt.      Action Taken: Other: cardiology    Travel Screening: Not Applicable     Thank you!  [facility]

## 2023-06-20 NOTE — TELEPHONE ENCOUNTER
Patient contacted by valve team to reschedule. Pt scheduled for 8/3.     Yvonne Baez RN on 6/20/2023 at 2:35 PM

## 2023-06-22 ENCOUNTER — TELEPHONE (OUTPATIENT)
Dept: FAMILY MEDICINE | Facility: CLINIC | Age: 73
End: 2023-06-22
Payer: COMMERCIAL

## 2023-06-22 NOTE — TELEPHONE ENCOUNTER
S-(situation):      Patient calling requesting an antibiotic for a bed sore.    B-(background):     Patient was seen on 6/13/23 by PCP, Dr Brizuela and not examined or discussed at appointment.    A-(assessment):   Quarter size open area on right hip  Red are with sticky clear drainage   Pain 6/10  No fever, but chills on and off    R-(recommendations):   No appointments available in clinic today or tomorrow  Patient will go to [facility]  PCP, Dr Brizuela out of clinic today.  Message sent to Dr Brizuela for SOFI Maddox RN  [facility]

## 2023-06-22 NOTE — TELEPHONE ENCOUNTER
New Medication Request    Contacts       Type Contact Phone/Fax    06/22/2023 07:38 AM CDT Phone (Incoming) Mary Kay Tejada (Self) [phone] (M)          What medication are you requesting?: antibiotics     Reason for medication request: bed sores    Have you taken this medication before?: No    Controlled Substance Agreement on file:   CSA -- Patient Level:     [Media Unavailable] Controlled Substance Agreement - Opioid - Scan on 12/16/2015         Patient offered an appointment? Yes: but pt declined    Preferred Pharmacy:  [id number] - [place], [place] - [address] AT [facility] [address]  [address]  [place] [address]  Phone: [phone] Fax: [phone]      Could we send this information to you in [facility] or would you prefer to receive a phone call?:   Patient would prefer a phone call   Okay to leave a detailed message?: Yes at Cell number on file:    Telephone Information:   Mobile [phone]

## 2023-06-23 ENCOUNTER — MEDICAL CORRESPONDENCE (OUTPATIENT)
Dept: HEALTH INFORMATION MANAGEMENT | Facility: CLINIC | Age: 73
End: 2023-06-23
Payer: COMMERCIAL

## 2023-06-26 DIAGNOSIS — Z53.9 DIAGNOSIS NOT YET DEFINED: Primary | ICD-10-CM

## 2023-06-26 PROCEDURE — G0180 MD CERTIFICATION HHA PATIENT: HCPCS | Performed by: FAMILY MEDICINE

## 2023-06-27 ENCOUNTER — MEDICAL CORRESPONDENCE (OUTPATIENT)
Dept: HEALTH INFORMATION MANAGEMENT | Facility: CLINIC | Age: 73
End: 2023-06-27
Payer: COMMERCIAL

## 2023-06-30 ENCOUNTER — ONCOLOGY VISIT (OUTPATIENT)
Dept: ONCOLOGY | Facility: CLINIC | Age: 73
End: 2023-06-30
Attending: INTERNAL MEDICINE
Payer: COMMERCIAL

## 2023-06-30 ENCOUNTER — MEDICAL CORRESPONDENCE (OUTPATIENT)
Dept: HEALTH INFORMATION MANAGEMENT | Facility: CLINIC | Age: 73
End: 2023-06-30

## 2023-06-30 ENCOUNTER — LAB (OUTPATIENT)
Dept: INFUSION THERAPY | Facility: CLINIC | Age: 73
End: 2023-06-30
Attending: FAMILY MEDICINE
Payer: COMMERCIAL

## 2023-06-30 VITALS
SYSTOLIC BLOOD PRESSURE: 84 MMHG | WEIGHT: 128.9 LBS | DIASTOLIC BLOOD PRESSURE: 49 MMHG | HEIGHT: 66 IN | BODY MASS INDEX: 20.72 KG/M2 | HEART RATE: 61 BPM | OXYGEN SATURATION: 90 %

## 2023-06-30 DIAGNOSIS — D53.9 MACROCYTIC ANEMIA: Primary | ICD-10-CM

## 2023-06-30 LAB
ALBUMIN SERPL-MCNC: 3.3 G/DL (ref 3.5–5)
ALP SERPL-CCNC: 101 U/L (ref 45–120)
ALT SERPL W P-5'-P-CCNC: 16 U/L (ref 0–45)
ANION GAP SERPL CALCULATED.3IONS-SCNC: 10 MMOL/L (ref 5–18)
AST SERPL W P-5'-P-CCNC: 25 U/L (ref 0–40)
BASOPHILS # BLD AUTO: 0.1 10E3/UL (ref 0–0.2)
BASOPHILS NFR BLD AUTO: 1 %
BILIRUB SERPL-MCNC: 0.5 MG/DL (ref 0–1)
BUN SERPL-MCNC: 16 MG/DL (ref 8–28)
CALCIUM SERPL-MCNC: 9.8 MG/DL (ref 8.5–10.5)
CHLORIDE BLD-SCNC: 103 MMOL/L (ref 98–107)
CO2 SERPL-SCNC: 27 MMOL/L (ref 22–31)
CREAT SERPL-MCNC: 0.71 MG/DL (ref 0.6–1.1)
EOSINOPHIL # BLD AUTO: 0.2 10E3/UL (ref 0–0.7)
EOSINOPHIL NFR BLD AUTO: 3 %
ERYTHROCYTE [DISTWIDTH] IN BLOOD BY AUTOMATED COUNT: 27.7 % (ref 10–15)
GFR SERPL CREATININE-BSD FRML MDRD: 89 ML/MIN/1.73M2
GLUCOSE BLD-MCNC: 110 MG/DL (ref 70–125)
HCT VFR BLD AUTO: 44.9 % (ref 35–47)
HGB BLD-MCNC: 12.9 G/DL (ref 11.7–15.7)
IMM GRANULOCYTES # BLD: 0 10E3/UL
IMM GRANULOCYTES NFR BLD: 0 %
LYMPHOCYTES # BLD AUTO: 1.8 10E3/UL (ref 0.8–5.3)
LYMPHOCYTES NFR BLD AUTO: 26 %
MCH RBC QN AUTO: 26.5 PG (ref 26.5–33)
MCHC RBC AUTO-ENTMCNC: 28.7 G/DL (ref 31.5–36.5)
MCV RBC AUTO: 92 FL (ref 78–100)
MONOCYTES # BLD AUTO: 0.7 10E3/UL (ref 0–1.3)
MONOCYTES NFR BLD AUTO: 11 %
NEUTROPHILS # BLD AUTO: 4.1 10E3/UL (ref 1.6–8.3)
NEUTROPHILS NFR BLD AUTO: 59 %
NRBC # BLD AUTO: 0 10E3/UL
NRBC BLD AUTO-RTO: 0 /100
PLATELET # BLD AUTO: 206 10E3/UL (ref 150–450)
POTASSIUM BLD-SCNC: 4.6 MMOL/L (ref 3.5–5)
PROT SERPL-MCNC: 7.1 G/DL (ref 6–8)
RBC # BLD AUTO: 4.86 10E6/UL (ref 3.8–5.2)
SODIUM SERPL-SCNC: 140 MMOL/L (ref 136–145)
WBC # BLD AUTO: 6.9 10E3/UL (ref 4–11)

## 2023-06-30 PROCEDURE — 36415 COLL VENOUS BLD VENIPUNCTURE: CPT | Performed by: INTERNAL MEDICINE

## 2023-06-30 PROCEDURE — 80053 COMPREHEN METABOLIC PANEL: CPT | Performed by: INTERNAL MEDICINE

## 2023-06-30 PROCEDURE — 85025 COMPLETE CBC W/AUTO DIFF WBC: CPT | Performed by: INTERNAL MEDICINE

## 2023-06-30 PROCEDURE — G0463 HOSPITAL OUTPT CLINIC VISIT: HCPCS | Performed by: INTERNAL MEDICINE

## 2023-06-30 PROCEDURE — 99213 OFFICE O/P EST LOW 20 MIN: CPT | Performed by: INTERNAL MEDICINE

## 2023-06-30 ASSESSMENT — PAIN SCALES - GENERAL: PAINLEVEL: NO PAIN (0)

## 2023-06-30 NOTE — PROGRESS NOTES
"Oncology Rooming Note    June 30, 2023 1:43 PM   Mary Kay Tejada is a 73 year old female who presents for:    Chief Complaint   Patient presents with     Oncology Clinic Visit     Initial Vitals: Ht 1.676 m (5' 5.98\")   BMI 23.46 kg/m   Estimated body mass index is 23.46 kg/m  as calculated from the following:    Height as of this encounter: 1.676 m (5' 5.98\").    Weight as of 6/5/23: 65.9 kg (145 lb 4.8 oz). Body surface area is 1.75 meters squared.  Data Unavailable Comment: Data Unavailable   No LMP recorded. Patient is postmenopausal.  Allergies reviewed: Yes  Medications reviewed: Yes    Medications: Medication refills not needed today.  Pharmacy name entered into [employer]: [id number] - [place], [place] - [address] AT [facility] [address]    Clinical concerns: hospital follow up      Erika Albert MA            "

## 2023-06-30 NOTE — LETTER
"    6/30/2023         RE: Mary Kay Tejada  [address]  [place] [facility] [address]        Dear Colleague,    Thank you for referring your patient, Mary Kay Tejada, to the [facility]. Please see a copy of my visit note below.    Oncology Rooming Note    June 30, 2023 1:43 PM   Mary Kay Tejada is a 73 year old female who presents for:    Chief Complaint   Patient presents with     Oncology Clinic Visit     Initial Vitals: Ht 1.676 m (5' 5.98\")   BMI 23.46 kg/m   Estimated body mass index is 23.46 kg/m  as calculated from the following:    Height as of this encounter: 1.676 m (5' 5.98\").    Weight as of 6/5/23: 65.9 kg (145 lb 4.8 oz). Body surface area is 1.75 meters squared.  Data Unavailable Comment: Data Unavailable   No LMP recorded. Patient is postmenopausal.  Allergies reviewed: Yes  Medications reviewed: Yes    Medications: Medication refills not needed today.  Pharmacy name entered into [employer] [id number] - [facility] [address] [place] [facility] [address]    Clinical concerns: hospital follow up      Erika Albert MA              [facility] Hematology and On[facility] Progress Note    Patient: Mary Kay Tejada  MRN: [id number]  Date of Service: Jun 30, 2023         Reason for Visit    Chief Complaint   Patient presents with     Oncology Clinic Visit       Assessment and Plan     Cancer Staging   No matching staging information was found for the patient.    ECOG Performance    1 - Can't do physically strenuous work, but fully ambyulatory and can do light sedentary work     Pain  Pain Score: No Pain (0)    #.  Chronic iron deficiency anemia suspected occult blood loss  #.  On long-term anticoagulant use  #.  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       Post 1 unit of packed RBC transfusion (5/26/2023) and IV Venofer 300 mg (5/28/2023 and 5/31/2023).  I reviewed her labs today.  Her hemoglobin is completely normalized at 12.9 from 8.3 about a month " ago.  MCV was also normalized at 92 from previous microcytosis.  She clearly responded to IV iron therapy.   She is going to follow-up with cardiology closely regarding cardiac arrhythmia.  She was previously discussed that she needed to complete endoscopic evaluation to determine the source of bleeding.  She is advised to follow-up with gastroenterology as well for indication of endoscopic evaluation.   From hematology standpoint, her anemia is not consistent with marrow disease.  It is purely iron deficiency anemia and it was completely corrected after IV iron therapy.  Regular follow-up with hematology is not needed.  However, I recommended her to call me if she needs to be evaluated for anemia again.    Encounter Diagnoses:    Problem List Items Addressed This Visit    None  Visit Diagnoses     Macrocytic anemia    -  Primary    Relevant Orders    CBC with platelets and differential (Completed)    Comprehensive metabolic panel (Completed)             CC: SAVANA SILVER MD   ______________________________________________________________________________    History of Present Illness    Ms. Mary Kay Tejada presented today accompanied by her son for follow-up macrocytic anemia.  Since discharge from the hospital, she reported she does not notice any worsening shortness of breath.  She does not notice any bleeding or blood loss.  She feels like her stomach is bloating and she feels swelling in the groin and worried that she has hernia.    Review of systems  Apart from describing in HPI, the remainder of comprehensive ROS was negative.    Past History    Past Medical History:   Diagnosis Date     Anemia      Aortic stenosis      Atrial fibrillation (H)      Atrial flutter (H)      Benign neoplasm of adenomatous polyp     large intestine      Chronic constipation      Chronic pain syndrome      COPD (chronic obstructive pulmonary disease) (H)     Oxygen at night      Dependence on supplemental oxygen      Oxygen at noc, during the day as needed     Depression      Diabetes mellitus (H)      Dry eye syndrome      Fibromyalgia      Ganglion     left wrist     GERD (gastroesophageal reflux disease)      Hyperlipidemia      Hypertension      Hypokalemia      Infective otitis externa, unspecified     Created by Conversion      Larynx edema      Lung disease      Malignant neoplasm of vulva (H)     Created by Conversion [facility] Annotation: Apr 17 2007  8:24AM - Pattimiguel aYusufe:  resection per Dr. Alfonso Mane 9/06;  Replacement Utility updated for latest IMO load     Medial epicondylitis      Onychomycosis      Osteoarthritis      Peptic ulcer      Polyneuropathy      Vulvar malignant neoplasm (H)        Past Surgical History:   Procedure Laterality Date     BIOPSY BREAST Right      BIOPSY BREAST Right 01/28/2015     BIOPSY BREAST Right 1/28/2015    Procedure: RIGHT BREAST BIOPSY AFTER WIRE LOCALIZATION AT 0940;  Surgeon: Renée Soriano MD;  Location: [facility];  Service:      BIOPSY OF BREAST, INCISIONAL      Description: Incisional Breast Biopsy;  Recorded: 11/13/2007;  Comments: benign     COLONOSCOPY N/A 6/14/2019    Procedure: COLONOSCOPY;  Surgeon: Eduardo Mora MD;  Location: [facility];  Service: Gastroenterology     ESOPHAGOSCOPY, GASTROSCOPY, DUODENOSCOPY (EGD), COMBINED N/A 11/6/2018    Procedure: ESOPHAGOGASTRODUODENOSCOPY;  Surgeon: Lit Fernando MD;  Location: [facility];  Service:      HYSTERECTOMY       JOINT REPLACEMENT Left     TKA     PICC TRIPLE LUMEN PLACEMENT  1/12/2023          MT ABLATE HEART DYSRHYTHM FOCUS      Description: Catheter Ablation Atrial Fibrillation;  Recorded: 07/31/2012;  Comments: 7/24/12 PVI with Dr. Bansal to all 5 pulm veins and CTI fl ablation line as well.     ZZC SUPRACERV ABD HYSTERECTOMY      Description: Supracervical Hysterectomy;  Proc Date: 01/01/1985;  Comments: some cervix left!; ovaries intact; done for  "bleeding       Physical Exam    BP (!) 84/49 (BP Location: Right arm, Patient Position: Sitting, Cuff Size: Adult Regular)   Pulse 61   Ht 1.676 m (5' 5.98\")   Wt 58.5 kg (128 lb 14.4 oz)   SpO2 90%   BMI 20.82 kg/m      General: alert, awake, not in acute distress.  She came in a wheelchair.  HEENT: Head: Normal, normocephalic, atraumatic.  Eye: Normal external eye, conjunctiva, lids cornea, TIM.  Nose: Normal external nose, mucus membranes and septum.  Pharynx: Normal buccal mucosa. Normal pharynx.  Neck / Thyroid: Supple, no masses, nodes, nodules or enlargement.  Lymphatics: No abnormally enlarged lymph nodes.  Chest: Normal chest wall and respirations. Clear to auscultation.  Heart: S1 S2 RRR, no murmur.   Abdomen: abdomen is soft without significant tenderness, masses, organomegaly or guarding  Extremities: normal strength, tone, and muscle mass  Skin: normal. no rash or abnormalities  CNS: non focal.    Lab Results    No results found for this or any previous visit (from the past 168 hour(s)).    Imaging    No results found.    30 minutes spent on the date of the encounter doing chart review, history and exam, documentation, communication of the treatment plan with the care team and further activities as noted above.    Signed by: Chandra Major MD          Again, thank you for allowing me to participate in the care of your patient.        Sincerely,        Chandra Major MD    "

## 2023-06-30 NOTE — PROGRESS NOTES
[facility] Hematology and Oncology Progress Note    Patient: Mary Kay Tejada  MRN: [id number]  Date of Service: Jun 30, 2023         Reason for Visit    Chief Complaint   Patient presents with     Oncology Clinic Visit       Assessment and Plan     Cancer Staging   No matching staging information was found for the patient.    ECOG Performance    1 - Can't do physically strenuous work, but fully ambyulatory and can do light sedentary work     Pain  Pain Score: No Pain (0)    #.  Chronic iron deficiency anemia suspected occult blood loss  #.  On long-term anticoagulant use  #.  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       Post 1 unit of packed RBC transfusion (5/26/2023) and IV Venofer 300 mg (5/28/2023 and 5/31/2023).  I reviewed her labs today.  Her hemoglobin is completely normalized at 12.9 from 8.3 about a month ago.  MCV was also normalized at 92 from previous microcytosis.  She clearly responded to IV iron therapy.   She is going to follow-up with cardiology closely regarding cardiac arrhythmia.  She was previously discussed that she needed to complete endoscopic evaluation to determine the source of bleeding.  She is advised to follow-up with gastroenterology as well for indication of endoscopic evaluation.   From hematology standpoint, her anemia is not consistent with marrow disease.  It is purely iron deficiency anemia and it was completely corrected after IV iron therapy.  Regular follow-up with hematology is not needed.  However, I recommended her to call me if she needs to be evaluated for anemia again.    Encounter Diagnoses:    Problem List Items Addressed This Visit    None  Visit Diagnoses     Macrocytic anemia    -  Primary    Relevant Orders    CBC with platelets and differential (Completed)    Comprehensive metabolic panel (Completed)             CC: SAVANA SILVER MD   ______________________________________________________________________________    History of Present Illness    Ms.  Mary Kay Tejada presented today accompanied by her son for follow-up macrocytic anemia.  Since discharge from the hospital, she reported she does not notice any worsening shortness of breath.  She does not notice any bleeding or blood loss.  She feels like her stomach is bloating and she feels swelling in the groin and worried that she has hernia.    Review of systems  Apart from describing in HPI, the remainder of comprehensive ROS was negative.    Past History    Past Medical History:   Diagnosis Date     Anemia      Aortic stenosis      Atrial fibrillation (H)      Atrial flutter (H)      Benign neoplasm of adenomatous polyp     large intestine      Chronic constipation      Chronic pain syndrome      COPD (chronic obstructive pulmonary disease) (H)     Oxygen at night      Dependence on supplemental oxygen     Oxygen at noc, during the day as needed     Depression      Diabetes mellitus (H)      Dry eye syndrome      Fibromyalgia      Ganglion     left wrist     GERD (gastroesophageal reflux disease)      Hyperlipidemia      Hypertension      Hypokalemia      Infective otitis externa, unspecified     Created by Conversion      Larynx edema      Lung disease      Malignant neoplasm of vulva (H)     Created by Conversion [facility] Annotation: Apr 17 2007  8:24AM - Cammy Bui:  resection per Dr. Alfonso Mane 9/06;  Replacement Utility updated for latest IMO load     Medial epicondylitis      Onychomycosis      Osteoarthritis      Peptic ulcer      Polyneuropathy      Vulvar malignant neoplasm (H)        Past Surgical History:   Procedure Laterality Date     BIOPSY BREAST Right      BIOPSY BREAST Right 01/28/2015     BIOPSY BREAST Right 1/28/2015    Procedure: RIGHT BREAST BIOPSY AFTER WIRE LOCALIZATION AT 0940;  Surgeon: Renée Soriano MD;  Location: [facility];  Service:      BIOPSY OF BREAST, INCISIONAL      Description: Incisional Breast Biopsy;  Recorded: 11/13/2007;  Comments:  "benign     COLONOSCOPY N/A 6/14/2019    Procedure: COLONOSCOPY;  Surgeon: Eduardo Mora MD;  Location: [facility];  Service: Gastroenterology     ESOPHAGOSCOPY, GASTROSCOPY, DUODENOSCOPY (EGD), COMBINED N/A 11/6/2018    Procedure: ESOPHAGOGASTRODUODENOSCOPY;  Surgeon: Lit Fernando MD;  Location: [facility];  Service:      HYSTERECTOMY       JOINT REPLACEMENT Left     TKA     PICC TRIPLE LUMEN PLACEMENT  1/12/2023          LA ABLATE HEART DYSRHYTHM FOCUS      Description: Catheter Ablation Atrial Fibrillation;  Recorded: 07/31/2012;  Comments: 7/24/12 PVI with Dr. Gardiner and nilay to all 5 pulm veins and CTI fl ablation line as well.     ZZC SUPRACERV ABD HYSTERECTOMY      Description: Supracervical Hysterectomy;  Proc Date: 01/01/1985;  Comments: some cervix left!; ovaries intact; done for bleeding       Physical Exam    BP (!) 84/49 (BP Location: Right arm, Patient Position: Sitting, Cuff Size: Adult Regular)   Pulse 61   Ht 1.676 m (5' 5.98\")   Wt 58.5 kg (128 lb 14.4 oz)   SpO2 90%   BMI 20.82 kg/m      General: alert, awake, not in acute distress.  She came in a wheelchair.  HEENT: Head: Normal, normocephalic, atraumatic.  Eye: Normal external eye, conjunctiva, lids cornea, TIM.  Nose: Normal external nose, mucus membranes and septum.  Pharynx: Normal buccal mucosa. Normal pharynx.  Neck / Thyroid: Supple, no masses, nodes, nodules or enlargement.  Lymphatics: No abnormally enlarged lymph nodes.  Chest: Normal chest wall and respirations. Clear to auscultation.  Heart: S1 S2 RRR, no murmur.   Abdomen: abdomen is soft without significant tenderness, masses, organomegaly or guarding  Extremities: normal strength, tone, and muscle mass  Skin: normal. no rash or abnormalities  CNS: non focal.    Lab Results    No results found for this or any previous visit (from the past 168 hour(s)).    Imaging    No results found.    30 minutes spent on the date of the encounter doing chart review, " history and exam, documentation, communication of the treatment plan with the care team and further activities as noted above.    Signed by: Chandra Major MD

## 2023-07-03 ENCOUNTER — TELEPHONE (OUTPATIENT)
Dept: FAMILY MEDICINE | Facility: CLINIC | Age: 73
End: 2023-07-03
Payer: COMMERCIAL

## 2023-07-03 NOTE — TELEPHONE ENCOUNTER
New Medication Request    Contacts       Type Contact Phone/Fax    07/03/2023 10:40 AM CDT Phone (Incoming) Mary Kay Tejada (Self) [phone] (M)          What medication are you requesting?: Iron    Reason for medication request: Pt stated she was told that her iron levels were low and that she would need to start talking iron pills to help it.    Have you taken this medication before?: No    Controlled Substance Agreement on file:   CSA -- Patient Level:     [Media Unavailable] Controlled Substance Agreement - Opioid - Scan on 12/16/2015         Patient offered an appointment? No    Preferred Pharmacy:  [id number] - [place], [place] - [address] AT [facility] [address]  [address]  [place] [address]  Phone: [phone] Fax: [phone]      Could we send this information to you in [facility] or would you prefer to receive a phone call?:   Patient would prefer a phone call   Okay to leave a detailed message?: Yes at Home number on file [phone] (home)

## 2023-07-03 NOTE — TELEPHONE ENCOUNTER
Ferritin level from 5/26/2023 within normal range.  Iron on TIBC was low, however CBC on 6/30/2023 was normal.  Defer starting iron supplementation to patient's PCP.

## 2023-07-05 ENCOUNTER — NURSE TRIAGE (OUTPATIENT)
Dept: NURSING | Facility: CLINIC | Age: 73
End: 2023-07-05
Payer: COMMERCIAL

## 2023-07-05 DIAGNOSIS — E11.649 TYPE 2 DIABETES MELLITUS WITH HYPOGLYCEMIA WITHOUT COMA, WITHOUT LONG-TERM CURRENT USE OF INSULIN (H): ICD-10-CM

## 2023-07-05 DIAGNOSIS — J96.22 ACUTE AND CHRONIC RESPIRATORY FAILURE WITH HYPERCAPNIA (H): ICD-10-CM

## 2023-07-05 DIAGNOSIS — I48.0 PAROXYSMAL ATRIAL FIBRILLATION (H): ICD-10-CM

## 2023-07-05 DIAGNOSIS — I50.33 ACUTE ON CHRONIC DIASTOLIC HEART FAILURE (H): ICD-10-CM

## 2023-07-05 DIAGNOSIS — J44.1 COPD EXACERBATION (H): ICD-10-CM

## 2023-07-05 RX ORDER — FLUTICASONE FUROATE AND VILANTEROL TRIFENATATE 200; 25 UG/1; UG/1
POWDER RESPIRATORY (INHALATION)
Qty: 60 EACH | Refills: 3 | Status: SHIPPED | OUTPATIENT
Start: 2023-07-05 | End: 2023-07-11

## 2023-07-05 RX ORDER — DILTIAZEM HYDROCHLORIDE 120 MG/1
CAPSULE, COATED, EXTENDED RELEASE ORAL
Qty: 90 CAPSULE | Refills: 2 | Status: ON HOLD | OUTPATIENT
Start: 2023-07-05 | End: 2024-02-10

## 2023-07-05 RX ORDER — METOPROLOL TARTRATE 25 MG/1
TABLET, FILM COATED ORAL
Qty: 180 TABLET | Refills: 2 | Status: SHIPPED | OUTPATIENT
Start: 2023-07-05 | End: 2024-02-29

## 2023-07-05 RX ORDER — EMPAGLIFLOZIN 10 MG/1
TABLET, FILM COATED ORAL
Qty: 90 TABLET | Refills: 2 | Status: SHIPPED | OUTPATIENT
Start: 2023-07-05 | End: 2024-05-28

## 2023-07-05 RX ORDER — DIGOXIN 125 MCG
TABLET ORAL
Qty: 90 TABLET | Refills: 2 | Status: SHIPPED | OUTPATIENT
Start: 2023-07-05 | End: 2024-02-29

## 2023-07-05 NOTE — TELEPHONE ENCOUNTER
"BREO ELLIPTA 200-25 MCG/ACT inhaler  Routing refill request to provider for review/approval because:  Medication is not marked for long term use    Last Written Prescription Date:  06/05/2023  Last Fill Quantity: 28,  # refills: 0   Last office visit provider:  06/13/2023     Metoprolol tartrate  Last Written Prescription Date:  06/05/2023  Last Fill Quantity: 60,  # refills: 0   Last office visit provider:  06/13/2023     Digoxin  Routing refill request to provider for review/approval because:  Labs not current:  Digoxin levels    Last Written Prescription Date:  06/06/2023  Last Fill Quantity: 30,  # refills: 0   Last office visit provider:  06/13/2023     Diltiazem ER COATED BEADS    Last Written Prescription Date:  06/06/2023  Last Fill Quantity: 30,  # refills: 0   Last office visit provider:  06/13/2023     JARDIANCE    Last Written Prescription Date:  06/06/2023  Last Fill Quantity: 30,  # refills: 0   Last office visit provider:  06/13/2023     Requested Prescriptions   Pending Prescriptions Disp Refills     BREO ELLIPTA 200-25 MCG/ACT inhaler [Pharmacy Med Name: BREO ELLIPTA 200-25MCG ORAL INH(30)]       Sig: INHALE 1 PUFF INTO THE LUNGS DAILY       Inhaled Steroids Protocol Passed - 7/5/2023  8:05 AM        Passed - Patient is age 12 or older        Passed - Recent (12 mo) or future (30 days) visit within the authorizing provider's specialty     Patient has had an office visit with the authorizing provider or a provider within the authorizing providers department within the previous 12 mos or has a future within next 30 days. See \"Patient Info\" tab in inbasket, or \"Choose Columns\" in Meds & Orders section of the refill encounter.              Passed - Medication is active on med list           metoprolol tartrate (LOPRESSOR) 25 MG tablet [Pharmacy Med Name: METOPROLOL TARTRATE 25MG  TABLETS] 60 tablet 0     Sig: TAKE 1 TABLET(25 MG) BY MOUTH TWICE DAILY       Beta-Blockers Protocol Passed - 7/5/2023  8:05 " "AM        Passed - Blood pressure under 140/90 in past 12 months     BP Readings from Last 3 Encounters:   06/30/23 (!) 84/49   06/13/23 112/64   06/05/23 100/53                 Passed - Patient is age 6 or older        Passed - Recent (12 mo) or future (30 days) visit within the authorizing provider's specialty     Patient has had an office visit with the authorizing provider or a provider within the authorizing providers department within the previous 12 mos or has a future within next 30 days. See \"Patient Info\" tab in inbasket, or \"Choose Columns\" in Meds & Orders section of the refill encounter.              Passed - Medication is active on med list           digoxin (LANOXIN) 125 MCG tablet [Pharmacy Med Name: DIGOXIN 0.125MG TABLETS (YELLOW)] 30 tablet 0     Sig: TAKE 1 TABLET(125 MCG) BY MOUTH DAILY       Cardiac Glycoside Agents Protocol Failed - 7/5/2023  8:05 AM        Failed - Normal digoxin level on file in past 12 mos     No lab results found.    Invalid input(s):  [id number]          Passed - Medication is active on med list        Passed - Patient is 18 years of age or older        Passed - Patient is not pregnant        Passed - Normal serum creatinine on file in past 12 mos     Recent Labs   Lab Test 06/30/23  1352   CR 0.71       Ok to refill medication if creatinine is low          Passed - No positive pregnancy test on file in past 12 mos        Passed - Recent (6 mo) or future (30 days) visit within the authorizing provider's specialty     Patient had office visit in the last 6 months or has a visit in the next 30 days with authorizing provider or within the authorizing provider's specialty.  See \"Patient Info\" tab in inbasket, or \"Choose Columns\" in Meds & Orders section of the refill encounter.               diltiazem ER COATED BEADS (CARDIZEM CD/CARTIA XT) 120 MG 24 hr capsule [Pharmacy Med Name: DILTIAZEM CD 120MG CAPSULES (24 HR)] 30 capsule 0     Sig: TAKE 1 CAPSULE(120 MG) BY MOUTH DAILY " "      Calcium Channel Blockers Protocol  Passed - 7/5/2023  8:05 AM        Passed - Blood pressure under 140/90 in past 12 months     BP Readings from Last 3 Encounters:   06/30/23 (!) 84/49   06/13/23 112/64   06/05/23 100/53                 Passed - Normal ALT in past 12 months     Recent Labs   Lab Test 06/30/23  1352   ALT 16             Passed - Recent (12 mo) or future (30 days) visit within the authorizing provider's specialty     Patient has had an office visit with the authorizing provider or a provider within the authorizing providers department within the previous 12 mos or has a future within next 30 days. See \"Patient Info\" tab in inbasket, or \"Choose Columns\" in Meds & Orders section of the refill encounter.              Passed - Medication is active on med list        Passed - Patient is age 18 or older        Passed - No active pregnancy on record        Passed - Normal serum creatinine on file in past 12 months     Recent Labs   Lab Test 06/30/23  1352   CR 0.71       Ok to refill medication if creatinine is low          Passed - No positive pregnancy test in past 12 months           JARDIANCE 10 MG TABS tablet [Pharmacy Med Name: JARDIANCE 10MG TABLETS] 30 tablet 0     Sig: TAKE 1 TABLET(10 MG) BY MOUTH DAILY       Sodium Glucose Co-Transport Inhibitor Agents Passed - 7/5/2023  8:05 AM        Passed - Patient has documented A1c within the specified period of time.     If HgbA1C is 8 or greater, it needs to be on file within the past 3 months.  If less than 8, must be on file within the past 6 months.     Recent Labs   Lab Test 05/25/23  0507   A1C 6.1*             Passed - No creatinine >1.4 or GFR <45 within the past 12 mos     Recent Labs   Lab Test 06/30/23  1352 07/21/21  0911 11/11/20  1123   GFRESTIMATED 89   < > >60   GFRESTBLACK  --   --  >60    < > = values in this interval not displayed.       Recent Labs   Lab Test 06/30/23  1352   CR 0.71             Passed - Medication is active on med " "list        Passed - Patient is age 18 or older        Passed - Patient is not pregnant        Passed - Patient has documented normal Potassium within the last 12 mos.     Recent Labs   Lab Test 06/30/23  1352   POTASSIUM 4.6             Passed - Patient has no positive pregnancy test within the past 12 mos.        Passed - Recent (6 mo) or future (30 days) visit within the authorizing provider's specialty     Patient had office visit in the last 6 months or has a visit in the next 30 days with authorizing provider or within the authorizing provider's specialty.  See \"Patient Info\" tab in inbasket, or \"Choose Columns\" in Meds & Orders section of the refill encounter.                 Chantel Garcia RN 07/05/23 11:58 AM  "

## 2023-07-05 NOTE — TELEPHONE ENCOUNTER
Patient calling about her medication refill request that was sent over this morning. She is completely out of her medications. The refill request was routed to provider for review and approval. Writer will re-route as high priority since patient is out of these medications.     Jaime Mao RN on 7/5/2023 at 6:07 PM    Reason for Disposition    Caller has medicine question only, adult not sick, AND triager answers question    Additional Information    Negative: [1] Intentional drug overdose AND [2] suicidal thoughts or ideas    Negative: MORE THAN A DOUBLE DOSE of a prescription or over-the-counter (OTC) drug    Negative: [1] DOUBLE DOSE (an extra dose or lesser amount) of prescription drug AND [2] any symptoms (e.g., dizziness, nausea, pain, sleepiness)    Negative: [1] DOUBLE DOSE (an extra dose or lesser amount) of over-the-counter (OTC) drug AND [2] any symptoms (e.g., dizziness, nausea, pain, sleepiness)    Negative: Took another person's prescription drug    Negative: [1] DOUBLE DOSE (an extra dose or lesser amount) of prescription drug AND [2] NO symptoms (Exception: a double dose of antibiotics)    Negative: Diabetes drug error or overdose (e.g., took wrong type of insulin or took extra dose)    Negative: [1] Prescription not at pharmacy AND [2] was prescribed by PCP recently (Exception: triager has access to EMR and prescription is recorded there. Go to Home Care and confirm for pharmacy.)    Negative: [1] Pharmacy calling with prescription question AND [2] triager unable to answer question    Negative: [1] Caller has URGENT medicine question about med that PCP or specialist prescribed AND [2] triager unable to answer question    Negative: Medicine patch causing local rash or itching    Negative: [1] Caller has medicine question about med NOT prescribed by PCP AND [2] triager unable to answer question (e.g., compatibility with other med, storage)    Negative: Prescription request for new medicine (not a  refill)    Negative: [1] Caller has NON-URGENT medicine question about med that PCP prescribed AND [2] triager unable to answer question    Negative: Caller wants to use a complementary or alternative medicine    Negative: [1] Prescription prescribed recently is not at pharmacy AND [2] triager has access to patient's EMR AND [3] prescription is recorded in the EMR    Negative: [1] DOUBLE DOSE (an extra dose or lesser amount) of over-the-counter (OTC) drug AND [2] NO symptoms    Negative: [1] DOUBLE DOSE (an extra dose or lesser amount) of antibiotic drug AND [2] NO symptoms    Protocols used: MEDICATION QUESTION CALL-A-

## 2023-07-05 NOTE — TELEPHONE ENCOUNTER
I agree with Dr. Carey - the ferritin was normal a month ago; hemoglobin was normal on 6/30.  I think it is fine to wait on starting iron.

## 2023-07-06 ENCOUNTER — TELEPHONE (OUTPATIENT)
Dept: FAMILY MEDICINE | Facility: CLINIC | Age: 73
End: 2023-07-06
Payer: COMMERCIAL

## 2023-07-06 DIAGNOSIS — J43.9 PULMONARY EMPHYSEMA, UNSPECIFIED EMPHYSEMA TYPE (H): Primary | ICD-10-CM

## 2023-07-06 DIAGNOSIS — I50.33 ACUTE ON CHRONIC DIASTOLIC HEART FAILURE (H): ICD-10-CM

## 2023-07-06 RX ORDER — DILTIAZEM HYDROCHLORIDE 120 MG/1
CAPSULE, COATED, EXTENDED RELEASE ORAL
Qty: 30 CAPSULE | Refills: 0 | OUTPATIENT
Start: 2023-07-06

## 2023-07-06 RX ORDER — DIGOXIN 125 MCG
TABLET ORAL
Qty: 30 TABLET | Refills: 0 | OUTPATIENT
Start: 2023-07-06

## 2023-07-06 RX ORDER — METOPROLOL TARTRATE 25 MG/1
TABLET, FILM COATED ORAL
Qty: 60 TABLET | Refills: 0 | OUTPATIENT
Start: 2023-07-06

## 2023-07-06 RX ORDER — EMPAGLIFLOZIN 10 MG/1
TABLET, FILM COATED ORAL
Qty: 30 TABLET | Refills: 0 | OUTPATIENT
Start: 2023-07-06

## 2023-07-06 NOTE — TELEPHONE ENCOUNTER
"Routing refill request to provider for review/approval because:  Blood pressure failed    Last Written Prescription Date:  6/5/2023  Last Fill Quantity: 60,  # refills: 0   Last office visit provider:  6/13/2023     Requested Prescriptions   Pending Prescriptions Disp Refills     furosemide (LASIX) 20 MG tablet [Pharmacy Med Name: FUROSEMIDE 20MG TABLETS] 60 tablet 0     Sig: TAKE 1 TABLET(20 MG) BY MOUTH TWICE DAILY       Diuretics (Including Combos) Protocol Passed - 7/6/2023  8:04 AM        Passed - Blood pressure under 140/90 in past 12 months     BP Readings from Last 3 Encounters:   06/30/23 (!) 84/49   06/13/23 112/64   06/05/23 100/53                 Passed - Recent (12 mo) or future (30 days) visit within the authorizing provider's specialty     Patient has had an office visit with the authorizing provider or a provider within the authorizing providers department within the previous 12 mos or has a future within next 30 days. See \"Patient Info\" tab in inbasket, or \"Choose Columns\" in Meds & Orders section of the refill encounter.              Passed - Medication is active on med list        Passed - Patient is age 18 or older        Passed - No active pregancy on record        Passed - Normal serum creatinine on file in past 12 months     Recent Labs   Lab Test 06/30/23  1352   CR 0.71              Passed - Normal serum potassium on file in past 12 months     Recent Labs   Lab Test 06/30/23  1352   POTASSIUM 4.6                    Passed - Normal serum sodium on file in past 12 months     Recent Labs   Lab Test 06/30/23  1352                 Passed - No positive pregnancy test in past 12 months             Tami Turner RN 07/06/23 11:55 AM  "

## 2023-07-06 NOTE — TELEPHONE ENCOUNTER
"Refused refills as duplicate requests. Filled 7/5/2023 with 2 refills    Routing refill request to provider for review/approval because:  Medication started by inpatient provider.     Last Written Prescription Date:  6/5/2023  Last Fill Quantity: 30,  # refills: 0   Last office visit provider:  6/13/2023     Requested Prescriptions   Pending Prescriptions Disp Refills     potassium chloride ER (KLOR-CON M) 20 MEQ CR tablet [Pharmacy Med Name: POTASSIUM CL 20MEQ ER TABLETS] 30 tablet 0     Sig: TAKE 1 TABLET(20 MEQ) BY MOUTH DAILY       Potassium Supplements Protocol Passed - 7/5/2023  5:57 PM        Passed - Recent (12 mo) or future (30 days) visit within the authorizing provider's department     Patient has had an office visit with the authorizing provider or a provider within the authorizing providers department within the previous 12 mos or has a future within next 30 days. See \"Patient Info\" tab in inbasket, or \"Choose Columns\" in Meds & Orders section of the refill encounter.              Passed - Medication is active on med list        Passed - Patient is age 18 or older        Passed - Normal serum potassium in past 12 months     Recent Labs   Lab Test 06/30/23  1352   POTASSIUM 4.6                     Refused Prescriptions Disp Refills     digoxin (LANOXIN) 125 MCG tablet [Pharmacy Med Name: DIGOXIN 0.125MG TABLETS (YELLOW)] 30 tablet 0     Sig: TAKE 1 TABLET(125 MCG) BY MOUTH DAILY       Cardiac Glycoside Agents Protocol Failed - 7/5/2023  5:57 PM        Failed - Normal digoxin level on file in past 12 mos     No lab results found.    Invalid input(s):  [id number]          Passed - Medication is active on med list        Passed - Patient is 18 years of age or older        Passed - Patient is not pregnant        Passed - Normal serum creatinine on file in past 12 mos     Recent Labs   Lab Test 06/30/23  1352   CR 0.71       Ok to refill medication if creatinine is low          Passed - No positive pregnancy " "test on file in past 12 mos        Passed - Recent (6 mo) or future (30 days) visit within the authorizing provider's specialty     Patient had office visit in the last 6 months or has a visit in the next 30 days with authorizing provider or within the authorizing provider's specialty.  See \"Patient Info\" tab in inbasket, or \"Choose Columns\" in Meds & Orders section of the refill encounter.               diltiazem ER COATED BEADS (CARDIZEM CD/CARTIA XT) 120 MG 24 hr capsule [Pharmacy Med Name: DILTIAZEM CD 120MG CAPSULES (24 HR)] 30 capsule 0     Sig: TAKE 1 CAPSULE(120 MG) BY MOUTH DAILY       Calcium Channel Blockers Protocol  Passed - 7/5/2023  5:57 PM        Passed - Blood pressure under 140/90 in past 12 months     BP Readings from Last 3 Encounters:   06/30/23 (!) 84/49   06/13/23 112/64   06/05/23 100/53                 Passed - Normal ALT in past 12 months     Recent Labs   Lab Test 06/30/23  1352   ALT 16             Passed - Recent (12 mo) or future (30 days) visit within the authorizing provider's specialty     Patient has had an office visit with the authorizing provider or a provider within the authorizing providers department within the previous 12 mos or has a future within next 30 days. See \"Patient Info\" tab in [place], or \"Choose Columns\" in Meds & Orders section of the refill encounter.              Passed - Medication is active on med list        Passed - Patient is age 18 or older        Passed - No active pregnancy on record        Passed - Normal serum creatinine on file in past 12 months     Recent Labs   Lab Test 06/30/23  1352   CR 0.71       Ok to refill medication if creatinine is low          Passed - No positive pregnancy test in past 12 months           JARDIANCE 10 MG TABS tablet [Pharmacy Med Name: JARDIANCE 10MG TABLETS] 30 tablet 0     Sig: TAKE 1 TABLET(10 MG) BY MOUTH DAILY       Sodium Glucose Co-Transport Inhibitor Agents Passed - 7/5/2023  5:57 PM        Passed - Patient has " "documented A1c within the specified period of time.     If HgbA1C is 8 or greater, it needs to be on file within the past 3 months.  If less than 8, must be on file within the past 6 months.     Recent Labs   Lab Test 05/25/23  0507   A1C 6.1*             Passed - No creatinine >1.4 or GFR <45 within the past 12 mos     Recent Labs   Lab Test 06/30/23  1352 07/21/21  0911 11/11/20  1123   GFRESTIMATED 89   < > >60   GFRESTBLACK  --   --  >60    < > = values in this interval not displayed.       Recent Labs   Lab Test 06/30/23  1352   CR 0.71             Passed - Medication is active on med list        Passed - Patient is age 18 or older        Passed - Patient is not pregnant        Passed - Patient has documented normal Potassium within the last 12 mos.     Recent Labs   Lab Test 06/30/23  1352   POTASSIUM 4.6             Passed - Patient has no positive pregnancy test within the past 12 mos.        Passed - Recent (6 mo) or future (30 days) visit within the authorizing provider's specialty     Patient had office visit in the last 6 months or has a visit in the next 30 days with authorizing provider or within the authorizing provider's specialty.  See \"Patient Info\" tab in inbasket, or \"Choose Columns\" in Meds & Orders section of the refill encounter.               metoprolol tartrate (LOPRESSOR) 25 MG tablet [Pharmacy Med Name: METOPROLOL TARTRATE 25MG  TABLETS] 60 tablet 0     Sig: TAKE 1 TABLET(25 MG) BY MOUTH TWICE DAILY       Beta-Blockers Protocol Passed - 7/5/2023  5:57 PM        Passed - Blood pressure under 140/90 in past 12 months     BP Readings from Last 3 Encounters:   06/30/23 (!) 84/49   06/13/23 112/64   06/05/23 100/53                 Passed - Patient is age 6 or older        Passed - Recent (12 mo) or future (30 days) visit within the authorizing provider's specialty     Patient has had an office visit with the authorizing provider or a provider within the authorizing providers department within " "the previous 12 mos or has a future within next 30 days. See \"Patient Info\" tab in inbasket, or \"Choose Columns\" in Meds & Orders section of the refill encounter.              Passed - Medication is active on med list             Tami Turner RN 07/06/23 7:25 AM  "

## 2023-07-06 NOTE — TELEPHONE ENCOUNTER
Pt's insurance does not cover Fluticasone/Vilan 200-25mcg inhaler, you can either start a PA or send in one of the below covered alternatives.    -Dulera  -Symbicort

## 2023-07-06 NOTE — TELEPHONE ENCOUNTER
RN attempted to contact patient, but no answer. Left message on patient's voice mail to call clinic back.    If patient calls back, please relay message below from Dr Brizuela. Thanks    Denise Maddox RN  [facility]    Dr Brizuela    I agree with Dr. Carey - the ferritin was normal a month ago; hemoglobin was normal on 6/30.  I think it is fine to wait on starting iron.

## 2023-07-07 ENCOUNTER — MEDICAL CORRESPONDENCE (OUTPATIENT)
Dept: HEALTH INFORMATION MANAGEMENT | Facility: CLINIC | Age: 73
End: 2023-07-07
Payer: COMMERCIAL

## 2023-07-07 RX ORDER — POTASSIUM CHLORIDE 1500 MG/1
TABLET, EXTENDED RELEASE ORAL
Qty: 30 TABLET | Refills: 0 | Status: SHIPPED | OUTPATIENT
Start: 2023-07-07 | End: 2023-08-12

## 2023-07-07 RX ORDER — FUROSEMIDE 20 MG
TABLET ORAL
Qty: 60 TABLET | Refills: 0 | Status: SHIPPED | OUTPATIENT
Start: 2023-07-07 | End: 2023-08-12

## 2023-07-07 NOTE — TELEPHONE ENCOUNTER
Made 2nd attempt to contact patient, left message requesting call back. Please relay message below from Dr. Brizuela upon return call.

## 2023-07-10 ENCOUNTER — MEDICAL CORRESPONDENCE (OUTPATIENT)
Dept: HEALTH INFORMATION MANAGEMENT | Facility: CLINIC | Age: 73
End: 2023-07-10
Payer: COMMERCIAL

## 2023-07-10 NOTE — TELEPHONE ENCOUNTER
Called patient, she reports someone had already called her and relayed this message (unsure who, as nothing is documented). Patient denied questions. No further action needed.

## 2023-07-11 PROBLEM — J43.9 PULMONARY EMPHYSEMA, UNSPECIFIED EMPHYSEMA TYPE (H): Status: ACTIVE | Noted: 2023-07-11

## 2023-07-11 RX ORDER — BUDESONIDE AND FORMOTEROL FUMARATE DIHYDRATE 160; 4.5 UG/1; UG/1
2 AEROSOL RESPIRATORY (INHALATION) 2 TIMES DAILY
Qty: 10.2 G | Refills: 4 | Status: SHIPPED | OUTPATIENT
Start: 2023-07-11 | End: 2023-12-12

## 2023-07-12 ENCOUNTER — OFFICE VISIT (OUTPATIENT)
Dept: FAMILY MEDICINE | Facility: CLINIC | Age: 73
End: 2023-07-12
Payer: COMMERCIAL

## 2023-07-12 VITALS
OXYGEN SATURATION: 84 % | DIASTOLIC BLOOD PRESSURE: 57 MMHG | SYSTOLIC BLOOD PRESSURE: 111 MMHG | HEART RATE: 54 BPM | TEMPERATURE: 97.8 F | RESPIRATION RATE: 16 BRPM

## 2023-07-12 DIAGNOSIS — M79.7 FIBROMYALGIA: ICD-10-CM

## 2023-07-12 DIAGNOSIS — E11.42 DIABETIC POLYNEUROPATHY ASSOCIATED WITH TYPE 2 DIABETES MELLITUS (H): ICD-10-CM

## 2023-07-12 DIAGNOSIS — I48.91 ATRIAL FIBRILLATION, UNSPECIFIED TYPE (H): ICD-10-CM

## 2023-07-12 DIAGNOSIS — J43.9 PULMONARY EMPHYSEMA, UNSPECIFIED EMPHYSEMA TYPE (H): ICD-10-CM

## 2023-07-12 DIAGNOSIS — G89.4 CHRONIC PAIN SYNDROME: ICD-10-CM

## 2023-07-12 DIAGNOSIS — F33.1 MODERATE EPISODE OF RECURRENT MAJOR DEPRESSIVE DISORDER (H): ICD-10-CM

## 2023-07-12 DIAGNOSIS — M54.6 TRIGGER POINT OF THORACIC REGION: Primary | ICD-10-CM

## 2023-07-12 PROCEDURE — 99213 OFFICE O/P EST LOW 20 MIN: CPT | Mod: 25 | Performed by: FAMILY MEDICINE

## 2023-07-12 PROCEDURE — 20553 NJX 1/MLT TRIGGER POINTS 3/>: CPT | Performed by: FAMILY MEDICINE

## 2023-07-12 RX ORDER — OXYCODONE HYDROCHLORIDE 10 MG/1
10 TABLET ORAL EVERY 6 HOURS PRN
Qty: 120 TABLET | Refills: 0 | Status: SHIPPED | OUTPATIENT
Start: 2023-07-12 | End: 2023-08-16

## 2023-07-12 ASSESSMENT — PATIENT HEALTH QUESTIONNAIRE - PHQ9
SUM OF ALL RESPONSES TO PHQ QUESTIONS 1-9: 1
SUM OF ALL RESPONSES TO PHQ QUESTIONS 1-9: 1
10. IF YOU CHECKED OFF ANY PROBLEMS, HOW DIFFICULT HAVE THESE PROBLEMS MADE IT FOR YOU TO DO YOUR WORK, TAKE CARE OF THINGS AT HOME, OR GET ALONG WITH OTHER PEOPLE: SOMEWHAT DIFFICULT

## 2023-07-12 ASSESSMENT — ENCOUNTER SYMPTOMS
CHILLS: 0
NECK PAIN: 1
FATIGUE: 1
COUGH: 1
PSYCHIATRIC NEGATIVE: 1
ALLERGIC/IMMUNOLOGIC NEGATIVE: 1
BRUISES/BLEEDS EASILY: 1
BACK PAIN: 1
SHORTNESS OF BREATH: 1
FEVER: 0
POLYDIPSIA: 0
WEAKNESS: 1

## 2023-07-12 NOTE — PROGRESS NOTES
"  1. Trigger point of thoracic region  2. Fibromyalgia  3. Chronic pain syndrome  Patient with chronic pain/fibromyalgia - benefits from trigger point injections - does these monthly - with Lidocaine only.    As well, uses Oxycodone - has not had increase in dose in years.  Tolerates well.  Refilled today.      - lidocaine 1 % 10 mL  - oxyCODONE IR (ROXICODONE) 10 MG tablet; Take 1 tablet (10 mg) by mouth every 6 hours as needed for moderate to severe pain  Dispense: 120 tablet; Refill: 0      Procedures  Procedure Note - Trigger Point Injections    Consent obtained: verbal    Indication:  Fibromyalgia/trigger point pain     6 trigger points identified.  Each trigger point swabbed x 3 with Betadine swab.  Each trigger point then injected with 1.6 ml of 1% Lidocaine (no epi).    Total volume injected:  10 ml    Complications:  None, patient tolerated procedure well.    Plan:  Discussed care with patient.  RTC for further injections in 30 days prn.    4. Diabetic polyneuropathy associated with type 2 diabetes mellitus (H)  H/o type II DM - has been stable  Hemoglobin A1C   Date Value Ref Range Status   05/25/2023 6.1 (H) <5.7 % Final     Comment:     Normal <5.7%   Prediabetes 5.7-6.4%    Diabetes 6.5% or higher     Note: Adopted from ADA consensus guidelines.         5. Atrial fibrillation, unspecified type (H)  H/o atrial fib -     6. Pulmonary emphysema, unspecified emphysema type (H)  COPD - stable presently - discussed inhalers (Breo wasn't covered - changed to Symbicort)    7. Moderate episode of recurrent major depressive disorder (H)  Depression related to chronic medical illness -         Carol Muñiz is a 73 year old, presenting for the following health issues:  Trigger Point Injection (Pt is here for trigger shot. No concern )        7/12/2023     8:26 AM   Additional Questions   Roomed by hser   Accompanied by PCA care taker     Here for trigger point injections  \"really hurting\" now     Has had " "recent re-evaluations post hospital  Has follow up with cardiology - \"I need a new valave\"        History of Present Illness       Reason for visit:  Trigger shot    She eats 2-3 servings of fruits and vegetables daily.She consumes 2 sweetened beverage(s) daily.She exercises with enough effort to increase her heart rate 9 or less minutes per day.  She exercises with enough effort to increase her heart rate 3 or less days per week.   She is taking medications regularly.    Today's PHQ-9         PHQ-9 Total Score: 1    PHQ-9 Q9 Thoughts of better off dead/self-harm past 2 weeks :   Not at all    How difficult have these problems made it for you to do your work, take care of things at home, or get along with other people: Somewhat difficult               Review of Systems   Constitutional: Positive for fatigue. Negative for chills and fever.   HENT: Negative.    Eyes: Negative for visual disturbance.   Respiratory: Positive for cough (occasional mucus) and shortness of breath (chronic baseline).    Cardiovascular: Positive for peripheral edema (much improved). Negative for chest pain.   Endocrine: Negative for polydipsia and polyuria.   Genitourinary: Negative.    Musculoskeletal: Positive for back pain and neck pain.   Skin: Negative.    Allergic/Immunologic: Negative.    Neurological: Positive for weakness.   Hematological: Bruises/bleeds easily.   Psychiatric/Behavioral: Negative.    All other systems reviewed and are negative.           Objective    /57 (BP Location: Left arm, Patient Position: Sitting, Cuff Size: Adult Small)   Pulse 54   Temp 97.8  F (36.6  C) (Temporal)   Resp 16   SpO2 (!) 84%   There is no height or weight on file to calculate BMI.  Physical Exam  Vitals reviewed.   Constitutional:       General: She is not in acute distress.     Appearance: Normal appearance. She is ill-appearing (chronically ill appearing).   HENT:      Head: Normocephalic.      Right Ear: Tympanic membrane, ear " canal and external ear normal.      Left Ear: Tympanic membrane, ear canal and external ear normal.      Nose: Nose normal.      Mouth/Throat:      Mouth: Mucous membranes are moist.      Pharynx: No posterior oropharyngeal erythema.   Eyes:      Extraocular Movements: Extraocular movements intact.      Conjunctiva/sclera: Conjunctivae normal.      Pupils: Pupils are equal, round, and reactive to light.   Cardiovascular:      Rate and Rhythm: Normal rate and regular rhythm.      Pulses: Normal pulses.      Heart sounds: Normal heart sounds. No murmur heard.  Pulmonary:      Effort: Pulmonary effort is normal.      Breath sounds: Normal breath sounds.   Abdominal:      Palpations: Abdomen is soft. There is no mass.      Tenderness: There is no abdominal tenderness. There is no guarding or rebound.   Musculoskeletal:         General: Tenderness (trigger point tenderness in upper thoracic/paracervical muscles) present. No deformity. Normal range of motion.      Cervical back: Normal range of motion and neck supple.      Right lower leg: Edema present.      Left lower leg: Edema present.   Lymphadenopathy:      Cervical: No cervical adenopathy.   Skin:     General: Skin is warm and dry.   Neurological:      General: No focal deficit present.      Mental Status: She is alert.   Psychiatric:         Mood and Affect: Mood normal.         Behavior: Behavior normal.            No results found for this or any previous visit (from the past 24 hour(s)).          Prior to immunization administration, verified patients identity using patient s name and date of birth. Please see Immunization Activity for additional information.     Screening Questionnaire for Adult Immunization    Are you sick today?   No   Do you have allergies to medications, food, a vaccine component or latex?   No   Have you ever had a serious reaction after receiving a vaccination?   No   Do you have a long-term health problem with heart, lung, kidney, or  metabolic disease (e.g., diabetes), asthma, a blood disorder, no spleen, complement component deficiency, a cochlear implant, or a spinal fluid leak?  Are you on long-term aspirin therapy?   Yes   Do you have cancer, leukemia, HIV/AIDS, or any other immune system problem?   No   Do you have a parent, brother, or sister with an immune system problem?   No   In the past 3 months, have you taken medications that affect  your immune system, such as prednisone, other steroids, or anticancer drugs; drugs for the treatment of rheumatoid arthritis, Crohn s disease, or psoriasis; or have you had radiation treatments?   No   Have you had a seizure, or a brain or other nervous system problem?   No   During the past year, have you received a transfusion of blood or blood    products, or been given immune (gamma) globulin or antiviral drug?   No   For women: Are you pregnant or is there a chance you could become       pregnant during the next month?   No   Have you received any vaccinations in the past 4 weeks?   No     Immunization questionnaire was positive for at least one answer.  Notified provider.      Patient instructed to remain in clinic for 15 minutes afterwards, and to report any adverse reactions.     Screening performed by Mimi Amador CMA on 7/12/2023 at 8:31 AM.

## 2023-07-18 ENCOUNTER — PATIENT OUTREACH (OUTPATIENT)
Dept: GERIATRIC MEDICINE | Facility: CLINIC | Age: 73
End: 2023-07-18
Payer: COMMERCIAL

## 2023-07-18 NOTE — PROGRESS NOTES
[facility] Care Coordination Contact    Called member to schedule annual HRA home visit. HRA has been scheduled for 8/2/23..     Samantha Alexandra RN, PHN   [facility]  [phone]

## 2023-07-22 DIAGNOSIS — B37.2 CANDIDAL INTERTRIGO: ICD-10-CM

## 2023-07-22 RX ORDER — NYSTATIN 100000 [USP'U]/G
POWDER TOPICAL
Qty: 60 G | Refills: 3 | Status: SHIPPED | OUTPATIENT
Start: 2023-07-22 | End: 2023-09-20

## 2023-07-22 NOTE — TELEPHONE ENCOUNTER
"Last Written Prescription Date:  7/21/2020: please check with provider, break in medication.   Last Fill Quantity: 60 g,  # refills: 3   Last office visit provider:  7/12/2023 with PCP DR NAYELI Bui      Requested Prescriptions   Pending Prescriptions Disp Refills    NYSTOP [id number] UNIT/GM powder [Pharmacy Med Name: NYSTOP TOP PWDR 100,000 60GM] 60 g 3     Sig: APPLY TOPICALLY TO THE AFFECTED AREA 2-3 TIMES DAILY AS NEEDED       Antifungal Agents Passed - 7/22/2023  9:03 AM        Passed - Recent (12 mo) or future (30 days) visit within the authorizing provider's specialty     Patient has had an office visit with the authorizing provider or a provider within the authorizing providers department within the previous 12 mos or has a future within next 30 days. See \"Patient Info\" tab in inbasket, or \"Choose Columns\" in Meds & Orders section of the refill encounter.              Passed - Not Fluconazole or Terconazole      If oral Fluconazole or Terconazole, may refill if indicated in progress notes.           Passed - Medication is active on med list             Kami Khalil RN 07/22/23 3:49 PM  "

## 2023-08-02 ENCOUNTER — DOCUMENTATION ONLY (OUTPATIENT)
Dept: CARDIOLOGY | Facility: CLINIC | Age: 73
End: 2023-08-02
Payer: COMMERCIAL

## 2023-08-02 DIAGNOSIS — I35.0 NONRHEUMATIC AORTIC VALVE STENOSIS: Primary | ICD-10-CM

## 2023-08-02 NOTE — PROGRESS NOTES
"  Valve Worksheet    Patient Name: Mary Kay Tejada  : 1950  AGE: 73 year old     Patient City: [place], [place] BMI: 23.4   Height (CM): 167.64cm (5'2\")                                                    Weight (kg): 58.5 kg                                                        Contact info/Phone number:  [phone]  Initial STS: 1.6%                                           Referred by: Dr. Loya  Date: 23 Insurance: [employer]   Primary Care Physician: Dr. Bui  ():  Primary cardiologist: Dr. Pizano  ():    Past Medical History    Severe aortic stenosis, mild-mod AR, mild MR, paroxysmal afib s/p PVI in  (Eliquis/dilt), HTN, HLD, COPD, pleural effusionsDM II, osteoarthritis, fibromyalgia/chronic pain, GERD, obesity, Smoker      Transthoracic Echocardiogram    (Date: 23): M.8     Peak V: 4.31    ADALBERTO: 0.89    DI: 0.25   SVI: 47.3   EF: 60-65 %  Ao max P.8        Heart Cath Summary CTA (TAVR) DELROY   Not Yet Complete  Complete 23 N/a   Labs   Creat:   Creatinine   Date Value Ref Range Status   2023 0.71 0.60 - 1.10 mg/dL Final       GFR:   GFR Estimate   Date Value Ref Range Status   2023 89 >60 mL/min/1.73m2 Final   2023 81 >60 mL/min/1.73m2 Final     Comment:     eGFR calculated using  CKD-EPI equation.   2023 79 >60 mL/min/1.73m2 Final     Comment:     eGFR calculated using  CKD-EPI equation.   2020 >60 >60 mL/min/1.73m2 Final   2020 >60 >60 mL/min/1.73m2 Final   2020 >60 >60 mL/min/1.73m2 Final     GFR Estimate If Black   Date Value Ref Range Status   2020 >60 >60 mL/min/1.73m2 Final   2020 >60 >60 mL/min/1.73m2 Final   2020 >60 >60 mL/min/1.73m2 Final       Potassium:   Potassium   Date Value Ref Range Status   2023 4.6 3.5 - 5.0 mmol/L Final       Sodium:   Sodium   Date Value Ref Range Status   2023 140 136 - 145 mmol/L Final       WBC:   WBC Count   Date " "Value Ref Range Status   06/30/2023 6.9 4.0 - 11.0 10e3/uL Final     Comment:     Preliminary ANC is 4.1       Hgb:   Hemoglobin   Date Value Ref Range Status   06/30/2023 12.9 11.7 - 15.7 g/dL Final   06/03/2023 8.3 (L) 11.7 - 15.7 g/dL Final       HCT:   Hematocrit   Date Value Ref Range Status   06/30/2023 44.9 35.0 - 47.0 % Final       Plts:   Platelet Count   Date Value Ref Range Status   06/30/2023 206 150 - 450 10e3/uL Final       Albumin:   Lab Results   Component Value Date    ALBUMIN 3.3 06/30/2023       INR:   INR   Date Value Ref Range Status   02/08/2023 1.34 (H) 0.85 - 1.15 Final        Current Medications: Allergies:    Current Outpatient Medications   Medication    ACCU-CHEK SOFTCLIX LANCETS lancets    albuterol (PROAIR HFA/PROVENTIL HFA/VENTOLIN HFA) 108 (90 Base) MCG/ACT inhaler    apixaban ANTICOAGULANT (ELIQUIS) 5 MG tablet    atorvastatin (LIPITOR) 20 MG tablet    BD ULTRA-FINE SHORT PEN NEEDLE 31 gauge x 5/16\" Ndle    blood-glucose meter (ONETOUCH VERIO IQ METER) Misc    budesonide-formoterol (SYMBICORT) 160-4.5 MCG/ACT Inhaler    diaper,brief,adult,disposable (ADULT BRIEFS - LARGE) Misc    digoxin (LANOXIN) 125 MCG tablet    diltiazem ER COATED BEADS (CARDIZEM CD/CARTIA XT) 120 MG 24 hr capsule    furosemide (LASIX) 20 MG tablet    gabapentin (NEURONTIN) 600 MG tablet    generic lancets (FINGERSTIX LANCETS)    guaiFENesin-codeine (ROBITUSSIN AC) 100-10 MG/5ML solution    ipratropium - albuterol 0.5 mg/2.5 mg/3 mL (DUONEB) 0.5-2.5 (3) MG/3ML neb solution    JARDIANCE 10 MG TABS tablet    magnesium oxide (MAG-OX) 400 MG tablet    meclizine (ANTIVERT) 25 MG tablet    metoprolol tartrate (LOPRESSOR) 25 MG tablet    naloxone (NARCAN) 4 MG/0.1ML nasal spray    nicotine (NICOTROL) 10 MG inhaler    nystatin (NYSTOP) powder    NYSTOP [id number] UNIT/GM powder    omeprazole (PRILOSEC) 20 MG DR capsule    ONETOUCH VERIO IQ test strip    oxyCODONE IR (ROXICODONE) 10 MG tablet    potassium chloride ER " "(KLOR-CON M) 20 MEQ CR tablet    sucralfate (CARAFATE) 1 GM tablet    triamcinolone (KENALOG) 0.1 % external cream     No current facility-administered medications for this visit.        Allergies   Allergen Reactions    Celebrex [Celecoxib] Rash     patient had butterfly rash - \"lupus-like\"      Latex Rash                    "

## 2023-08-03 ENCOUNTER — OFFICE VISIT (OUTPATIENT)
Dept: CARDIOLOGY | Facility: CLINIC | Age: 73
End: 2023-08-03
Payer: COMMERCIAL

## 2023-08-03 ENCOUNTER — ALLIED HEALTH/NURSE VISIT (OUTPATIENT)
Dept: CARDIOLOGY | Facility: CLINIC | Age: 73
End: 2023-08-03
Payer: COMMERCIAL

## 2023-08-03 ENCOUNTER — LAB (OUTPATIENT)
Dept: CARDIOLOGY | Facility: CLINIC | Age: 73
End: 2023-08-03
Payer: COMMERCIAL

## 2023-08-03 ENCOUNTER — PATIENT OUTREACH (OUTPATIENT)
Dept: GERIATRIC MEDICINE | Facility: CLINIC | Age: 73
End: 2023-08-03

## 2023-08-03 VITALS
WEIGHT: 132 LBS | SYSTOLIC BLOOD PRESSURE: 103 MMHG | RESPIRATION RATE: 16 BRPM | DIASTOLIC BLOOD PRESSURE: 59 MMHG | HEART RATE: 62 BPM | BODY MASS INDEX: 21.32 KG/M2

## 2023-08-03 DIAGNOSIS — I35.0 NONRHEUMATIC AORTIC VALVE STENOSIS: ICD-10-CM

## 2023-08-03 DIAGNOSIS — I35.0 NONRHEUMATIC AORTIC VALVE STENOSIS: Primary | ICD-10-CM

## 2023-08-03 PROCEDURE — 93000 ELECTROCARDIOGRAM COMPLETE: CPT | Performed by: GENERAL ACUTE CARE HOSPITAL

## 2023-08-03 PROCEDURE — 99207 PR NO CHARGE LOS: CPT

## 2023-08-03 PROCEDURE — 99215 OFFICE O/P EST HI 40 MIN: CPT | Performed by: INTERNAL MEDICINE

## 2023-08-03 NOTE — PROGRESS NOTES
Pt. in a wheelchair, stood up to get her weight but she was unsteady. Pt. Unable to do the 5 meter walk.

## 2023-08-03 NOTE — LETTER
8/3/2023    SAVANA SILVER MD  [address] [facility] [address]    RE: Mary Kay Tejada       Dear Colleague,     I had the pleasure of seeing Mary Kay Tejada in the [facility].    HEART CARE ENCOUNTER CONSULTATON NOTE      [facility]  [phone]      Assessment/Recommendations   Assessment/Plan: 73F w/ aortic stenosis, AF/AFL, some CAD on CTA, HL, COPD, anemia who is here for evaluation of management options for her valvular disease.     # Aortic stenosis - I have personally reviewed the echo, which is significant for severe aortic stenosis w/ P/M 74/46 ADALBERTO 0.89 DI 0.25 SVI 47  - I have personally reviewed the CTA, which is significant for adequate R TF access, annular area 430 - # 23S3 w/ borderline LMT height of ~10 but large sinus, angle AP  - after a long discussion of natural history and w/up and management options w/ the patient and her family, she would like to have a chance to think about it and review additional information. Is she is in agreement to proceed w/ the remainder of her w/up., we will order angio +/- PCI, CTS consult, Dental eval         History of Present Illness/Subjective    HPI: Mary Kay Tejada is a 73 year old female w/ aortic stenosis, AF/AFL, some CAD on CTA, HL, COPD, anemia who is here for evaluation of management options for her valvular disease.     She has had moderate aortic stenosis that was being watched w/ routine echoes for years.  It seems that her symptoms of CONTE have been progressive over the past year. Currently, she uses a wheel chair due to balance issues and CONTE. No SOB at rest, no orthopnea but sleeps elevated, no PND - does use O2 at night. No CP/syncope + flutters. No N/V/D/F/C. Recently admitted for AF/ADHF in 6/23, when echo again showed severe aortic stenosis (known since at least 5/23). Lost 100 lbs over 6 years, 60 lbs lately, she doesn't know how but seems like diuresis may have been a  contributor.    Live w/ her son and his girlfriend, still smoking, no EtOH. Retired .     Recent Echocardiogram Results:  Compared to the prior study dated 1/11/23, there are changes as noted. Aortic  stenosis is now severe.  The left ventricle is normal in size. Left ventricular function is normal.The  ejection fraction is 60-65%.  The right ventricle is mildly dilated.  The left atrium is mildly dilated.  Severe valvular aortic stenosis. There is mild to moderate (1-2+) aortic  regurgitation.  Small pericardial effusion    Recent Coronary Angiogram Results:         Physical Examination  Review of Systems   Vitals: /59 (BP Location: Left arm, Patient Position: Sitting, Cuff Size: Adult Regular)   Pulse 62   Resp 16   Wt 59.9 kg (132 lb)   BMI 21.32 kg/m    BMI= Body mass index is 21.32 kg/m .  Wt Readings from Last 3 Encounters:   08/03/23 59.9 kg (132 lb)   06/30/23 58.5 kg (128 lb 14.4 oz)   06/05/23 65.9 kg (145 lb 4.8 oz)       General Appearance:   no distress, normal body habitus   ENT/Mouth: membranes moist, no oral lesions or bleeding gums.      EYES:  no scleral icterus, normal conjunctivae   Neck: no carotid bruits or thyromegaly   Chest/Lungs:   lungs are clear to auscultation, no rales or wheezing, no sternal scar, equal chest wall expansion    Cardiovascular:   Regular. Normal first and second heart sounds with 26 systolic murmur, no rubs, or gallops; the carotid, radial and posterior tibial pulses are intact, Jugular venous pressure 6, no edema bilaterally    Abdomen:  no organomegaly, masses, bruits, or tenderness; bowel sounds are present   Extremities: no cyanosis or clubbing   Skin: no xanthelasma, warm.    Neurologic: normal  bilateral, no tremors     Psychiatric: alert and oriented x3, calm        Please refer above for cardiac ROS details.        Medical History  Surgical History Family History Social History   Past Medical History:   Diagnosis Date    Anemia      Aortic stenosis     Atrial fibrillation (H)     Atrial flutter (H)     Benign neoplasm of adenomatous polyp     large intestine     Chronic constipation     Chronic pain syndrome     COPD (chronic obstructive pulmonary disease) (H)     Oxygen at night     Dependence on supplemental oxygen     Oxygen at noc, during the day as needed    Depression     Diabetes mellitus (H)     Dry eye syndrome     Fibromyalgia     Ganglion     left wrist    GERD (gastroesophageal reflux disease)     Hyperlipidemia     Hypertension     Hypokalemia     Infective otitis externa, unspecified     Created by Conversion     Larynx edema     Lung disease     Malignant neoplasm of vulva (H)     Created by Conversion [facility] Annotation: Apr 17 2007  8:24AM - Cammy Bui:  resection per Dr. Alfonso Mane 9/06;  Replacement Utility updated for latest IMO load    Medial epicondylitis     Onychomycosis     Osteoarthritis     Peptic ulcer     Polyneuropathy     Vulvar malignant neoplasm (H)      Past Surgical History:   Procedure Laterality Date    BIOPSY BREAST Right     BIOPSY BREAST Right 01/28/2015    BIOPSY BREAST Right 1/28/2015    Procedure: RIGHT BREAST BIOPSY AFTER WIRE LOCALIZATION AT 0940;  Surgeon: Renée Soriano MD;  Location: [facility] [place];  Service:     BIOPSY OF BREAST, INCISIONAL      Description: Incisional Breast Biopsy;  Recorded: 11/13/2007;  Comments: benign    COLONOSCOPY N/A 6/14/2019    Procedure: COLONOSCOPY;  Surgeon: Eduardo Mora MD;  Location: [facility] [place];  Service: Gastroenterology    ESOPHAGOSCOPY, GASTROSCOPY, DUODENOSCOPY (EGD), COMBINED N/A 11/6/2018    Procedure: ESOPHAGOGASTRODUODENOSCOPY;  Surgeon: Lit Fernando MD;  Location: [facility] [place];  Service:     HYSTERECTOMY      JOINT REPLACEMENT Left     TKA    PICC TRIPLE LUMEN PLACEMENT  1/12/2023         MO ABLATE HEART DYSRHYTHM FOCUS      Description: Catheter Ablation Atrial Fibrillation;  Recorded: 07/31/2012;   Comments: 7/24/12 PVI with Dr. Gardiner and nilay to all 5 pulm veins and CTI fl ablation line as well.    ZZC SUPRACERV ABD HYSTERECTOMY      Description: Supracervical Hysterectomy;  Proc Date: 01/01/1985;  Comments: some cervix left!; ovaries intact; done for bleeding     Family History   Problem Relation Age of Onset    Heart Failure Mother     Cancer Other         paternal HX-laryngeal     Alcoholism Sister     No Known Problems Daughter     No Known Problems Maternal Grandmother     No Known Problems Maternal Grandfather     No Known Problems Paternal Grandmother     No Known Problems Paternal Grandfather     No Known Problems Maternal Aunt     No Known Problems Paternal Aunt     Alcoholism Sister     Alcoholism Brother     Alcoholism Father     Cancer Paternal Uncle         Gastric-Alcohol    Cancer Paternal Uncle         gastric-Alcohol    Hereditary Breast and Ovarian Cancer Syndrome No family hx of     Breast Cancer No family hx of     Colon Cancer No family hx of     Endometrial Cancer No family hx of     Ovarian Cancer No family hx of         Social History     Socioeconomic History    Marital status:      Spouse name: Not on file    Number of children: Not on file    Years of education: Not on file    Highest education level: Not on file   Occupational History    Not on file   Tobacco Use    Smoking status: Every Day     Packs/day: 0.25     Types: Cigarettes    Smokeless tobacco: Never    Tobacco comments:     seen by TTS inpatient on 3/31/22   Substance and Sexual Activity    Alcohol use: Yes     Comment: Alcoholic Drinks/day: very little    Drug use: No    Sexual activity: Not on file   Other Topics Concern    Not on file   Social History Narrative    Not on file     Social Determinants of Health     Financial Resource Strain: Not on file   Food Insecurity: Not on file   Transportation Needs: Not on file   Physical Activity: Not on file   Stress: Not on file   Social Connections: Not on file  "  Intimate Partner Violence: Not on file   Housing Stability: Not on file           Medications  Allergies   Current Outpatient Medications   Medication Sig Dispense Refill    ACCU-CHEK SOFTCLIX LANCETS lancets [ACCU-CHEK SOFTCLIX LANCETS LANCETS] TEST THREE TIMES DAILY 300 each 3    albuterol (PROAIR HFA/PROVENTIL HFA/VENTOLIN HFA) 108 (90 Base) MCG/ACT inhaler INHALE 2 PUFFS BY MOUTH EVERY 4 HOURS AS NEEDED FOR WHEEZING 54 g 1    apixaban ANTICOAGULANT (ELIQUIS) 5 MG tablet Take 1 tablet (5 mg) by mouth 2 times daily 180 tablet 2    atorvastatin (LIPITOR) 20 MG tablet TAKE 1 TABLET(20 MG) BY MOUTH AT BEDTIME 90 tablet 3    BD ULTRA-FINE SHORT PEN NEEDLE 31 gauge x 5/16\" Ndle [BD ULTRA-FINE SHORT PEN NEEDLE 31 GAUGE X 5/16\" NDLE] TEST FOUR TIMES DAILY WITH MEALS AND AT BEDTIME 400 each 3    blood-glucose meter (ONETOUCH VERIO IQ METER) Misc [BLOOD-GLUCOSE METER (ONETOUCH VERIO IQ METER) MISC] Check blood sugar three times a day. 1 each 0    budesonide-formoterol (SYMBICORT) 160-4.5 MCG/ACT Inhaler Inhale 2 puffs into the lungs 2 times daily 10.2 g 4    diaper,brief,adult,disposable (ADULT BRIEFS - LARGE) Misc [DIAPER,BRIEF,ADULT,DISPOSABLE (ADULT BRIEFS - LARGE) MISC] Use 3-4 daily as needed for incontinence 120 each 6    digoxin (LANOXIN) 125 MCG tablet TAKE 1 TABLET(125 MCG) BY MOUTH DAILY 90 tablet 2    diltiazem ER COATED BEADS (CARDIZEM CD/CARTIA XT) 120 MG 24 hr capsule TAKE 1 CAPSULE(120 MG) BY MOUTH DAILY 90 capsule 2    furosemide (LASIX) 20 MG tablet TAKE 1 TABLET(20 MG) BY MOUTH TWICE DAILY 60 tablet 0    gabapentin (NEURONTIN) 600 MG tablet Take 1 tablet (600 mg) by mouth 3 times daily 270 tablet 2    generic lancets (FINGERSTIX LANCETS) [GENERIC LANCETS (FINGERSTIX LANCETS)] Dispense brand per patient's insurance at pharmacy discretion. 300 each 0    guaiFENesin-codeine (ROBITUSSIN AC) 100-10 MG/5ML solution Take 5-10 mLs by mouth every 4 hours as needed for cough 120 mL 0    ipratropium - albuterol " 0.5 mg/2.5 mg/3 mL (DUONEB) 0.5-2.5 (3) MG/3ML neb solution Take 1 vial (3 mLs) by nebulization every 6 hours as needed for shortness of breath or wheezing 90 mL 0    JARDIANCE 10 MG TABS tablet TAKE 1 TABLET(10 MG) BY MOUTH DAILY 90 tablet 2    magnesium oxide (MAG-OX) 400 MG tablet Take 1 tablet (400 mg) by mouth daily 30 tablet 0    meclizine (ANTIVERT) 25 MG tablet TAKE 1 TABLET(25 MG) BY MOUTH THREE TIMES DAILY AS NEEDED FOR DIZZINESS OR NAUSEA 45 tablet 5    metoprolol tartrate (LOPRESSOR) 25 MG tablet TAKE 1 TABLET(25 MG) BY MOUTH TWICE DAILY 180 tablet 2    naloxone (NARCAN) 4 MG/0.1ML nasal spray Spray 1 spray (4 mg) into one nostril alternating nostrils once as needed for opioid reversal every 2-3 minutes until assistance arrives 0.2 mL 0    nicotine (NICOTROL) 10 MG inhaler Use 1 cartridge as needed for urge to smoke by puffing over course of 20min.  Use 6-16 cart/day; reduce number of cart/day over 6-12 weeks. 168 each 1    nystatin (NYSTOP) powder [NYSTATIN (NYSTOP) POWDER] Apply 1 application topically 2 (two) times a day as needed. 2-3 times to affected area(s). 60 g 3    NYSTOP 199124 UNIT/GM powder APPLY TOPICALLY TO THE AFFECTED AREA 2-3 TIMES DAILY AS NEEDED 60 g 3    omeprazole (PRILOSEC) 20 MG DR capsule Take 20 mg by mouth daily      ONETOUCH VERIO IQ test strip USE TO TEST THREE TIMES DAILY 300 strip 1    oxyCODONE IR (ROXICODONE) 10 MG tablet Take 1 tablet (10 mg) by mouth every 6 hours as needed for moderate to severe pain 120 tablet 0    potassium chloride ER (KLOR-CON M) 20 MEQ CR tablet TAKE 1 TABLET(20 MEQ) BY MOUTH DAILY 30 tablet 0    sucralfate (CARAFATE) 1 GM tablet TAKE 1 TABLET BY MOUTH FOUR TIMES DAILY. CRUSH TABLET AND MIX IT WITH A LITTLE WATER THEN SWALLOW 360 tablet 3    triamcinolone (KENALOG) 0.1 % external cream Apply topically 2 times daily Apply topically sparingly BID to lower extremity rash 45 g 1       Allergies   Allergen Reactions    Celebrex [Celecoxib] Rash      "patient had butterfly rash - \"lupus-like\"      Latex Rash          Lab Results    Chemistry/lipid CBC Cardiac Enzymes/BNP/TSH/INR   Recent Labs   Lab Test 07/25/22  1026   CHOL 151   HDL 84   LDL 53   TRIG 72     Recent Labs   Lab Test 07/25/22  1026 12/24/21  0756 07/21/21  0911   LDL 53 77 72     Recent Labs   Lab Test 06/30/23  1352      POTASSIUM 4.6   CHLORIDE 103   CO2 27      BUN 16   CR 0.71   GFRESTIMATED 89   JOEY 9.8     Recent Labs   Lab Test 06/30/23  1352 06/03/23  0615 06/02/23  0057   CR 0.71 0.77 0.79     Recent Labs   Lab Test 05/25/23  0507 07/25/22  1026 12/24/21  0756   A1C 6.1* 5.8* 6.1*          Recent Labs   Lab Test 06/30/23  1352   WBC 6.9   HGB 12.9   HCT 44.9   MCV 92        Recent Labs   Lab Test 06/30/23  1352 06/03/23  0615 06/02/23  0057   HGB 12.9 8.3* 9.4*    Recent Labs   Lab Test 05/24/23  1322 05/24/23  1015 02/08/23  0759   TROPONINI 0.07 0.08 0.09     Recent Labs   Lab Test 06/02/23  0057 05/24/23  1015 02/08/23  0759   * 176* 867*     Recent Labs   Lab Test 05/24/23  1015   TSH 1.17     Recent Labs   Lab Test 02/08/23  0759 01/18/23  0522 01/17/23  0445   INR 1.34* 1.35* 1.37*        Moises Valencia MD      Thank you for allowing me to participate in the care of your patient.      Sincerely,     Moises Valencia MD     [facility]  cc:   No referring provider defined for this encounter.      "

## 2023-08-03 NOTE — PROGRESS NOTES
HEART CARE ENCOUNTER CONSULTATON NOTE      [facility]  [phone]      Assessment/Recommendations   Assessment/Plan: 73F w/ aortic stenosis, AF/AFL, some CAD on CTA, HL, COPD, anemia who is here for evaluation of management options for her valvular disease.     # Aortic stenosis - I have personally reviewed the echo, which is significant for severe aortic stenosis w/ P/M 74/46 ADALBERTO 0.89 DI 0.25 SVI 47  - I have personally reviewed the CTA, which is significant for adequate R TF access, annular area 430 - # 23S3 w/ borderline LMT height of ~10 but large sinus, angle AP  - after a long discussion of natural history and w/up and management options w/ the patient and her family, she would like to have a chance to think about it and review additional information. Is she is in agreement to proceed w/ the remainder of her w/up., we will order angio +/- PCI, CTS consult, Dental eval         History of Present Illness/Subjective    HPI: Mary Kay Tejada is a 73 year old female w/ aortic stenosis, AF/AFL, some CAD on CTA, HL, COPD, anemia who is here for evaluation of management options for her valvular disease.     She has had moderate aortic stenosis that was being watched w/ routine echoes for years.  It seems that her symptoms of CONTE have been progressive over the past year. Currently, she uses a wheel chair due to balance issues and CONTE. No SOB at rest, no orthopnea but sleeps elevated, no PND - does use O2 at night. No CP/syncope + flutters. No N/V/D/F/C. Recently admitted for AF/ADHF in 6/23, when echo again showed severe aortic stenosis (known since at least 5/23). Lost 100 lbs over 6 years, 60 lbs lately, she doesn't know how but seems like diuresis may have been a contributor.    Live w/ her son and his girlfriend, still smoking, no EtOH. Retired .     Recent Echocardiogram Results:  Compared to the prior study dated 1/11/23, there are changes as noted. Aortic  stenosis is now  severe.  The left ventricle is normal in size. Left ventricular function is normal.The  ejection fraction is 60-65%.  The right ventricle is mildly dilated.  The left atrium is mildly dilated.  Severe valvular aortic stenosis. There is mild to moderate (1-2+) aortic  regurgitation.  Small pericardial effusion    Recent Coronary Angiogram Results:         Physical Examination  Review of Systems   Vitals: /59 (BP Location: Left arm, Patient Position: Sitting, Cuff Size: Adult Regular)   Pulse 62   Resp 16   Wt 59.9 kg (132 lb)   BMI 21.32 kg/m    BMI= Body mass index is 21.32 kg/m .  Wt Readings from Last 3 Encounters:   08/03/23 59.9 kg (132 lb)   06/30/23 58.5 kg (128 lb 14.4 oz)   06/05/23 65.9 kg (145 lb 4.8 oz)       General Appearance:   no distress, normal body habitus   ENT/Mouth: membranes moist, no oral lesions or bleeding gums.      EYES:  no scleral icterus, normal conjunctivae   Neck: no carotid bruits or thyromegaly   Chest/Lungs:   lungs are clear to auscultation, no rales or wheezing, no sternal scar, equal chest wall expansion    Cardiovascular:   Regular. Normal first and second heart sounds with 26 systolic murmur, no rubs, or gallops; the carotid, radial and posterior tibial pulses are intact, Jugular venous pressure 6, no edema bilaterally    Abdomen:  no organomegaly, masses, bruits, or tenderness; bowel sounds are present   Extremities: no cyanosis or clubbing   Skin: no xanthelasma, warm.    Neurologic: normal  bilateral, no tremors     Psychiatric: alert and oriented x3, calm        Please refer above for cardiac ROS details.        Medical History  Surgical History Family History Social History   Past Medical History:   Diagnosis Date    Anemia     Aortic stenosis     Atrial fibrillation (H)     Atrial flutter (H)     Benign neoplasm of adenomatous polyp     large intestine     Chronic constipation     Chronic pain syndrome     COPD (chronic obstructive pulmonary disease) (H)      Oxygen at night     Dependence on supplemental oxygen     Oxygen at noc, during the day as needed    Depression     Diabetes mellitus (H)     Dry eye syndrome     Fibromyalgia     Ganglion     left wrist    GERD (gastroesophageal reflux disease)     Hyperlipidemia     Hypertension     Hypokalemia     Infective otitis externa, unspecified     Created by Conversion     Larynx edema     Lung disease     Malignant neoplasm of vulva (H)     Created by Conversion [facility] Annotation: Apr 17 2007  8:24AM - Pattimiguel [email or web address]:  resection per Dr. Alfonso Mane 9/06;  Replacement Utility updated for latest IMO load    Medial epicondylitis     Onychomycosis     Osteoarthritis     Peptic ulcer     Polyneuropathy     Vulvar malignant neoplasm (H)      Past Surgical History:   Procedure Laterality Date    BIOPSY BREAST Right     BIOPSY BREAST Right 01/28/2015    BIOPSY BREAST Right 1/28/2015    Procedure: RIGHT BREAST BIOPSY AFTER WIRE LOCALIZATION AT 0940;  Surgeon: Renée Soriano MD;  Location: [facility];  Service:     BIOPSY OF BREAST, INCISIONAL      Description: Incisional Breast Biopsy;  Recorded: 11/13/2007;  Comments: benign    COLONOSCOPY N/A 6/14/2019    Procedure: COLONOSCOPY;  Surgeon: Eduardo Mora MD;  Location: [facility];  Service: Gastroenterology    ESOPHAGOSCOPY, GASTROSCOPY, DUODENOSCOPY (EGD), COMBINED N/A 11/6/2018    Procedure: ESOPHAGOGASTRODUODENOSCOPY;  Surgeon: Lit Fernando MD;  Location: [facility];  Service:     HYSTERECTOMY      JOINT REPLACEMENT Left     TKA    PICC TRIPLE LUMEN PLACEMENT  1/12/2023         CA ABLATE HEART DYSRHYTHM FOCUS      Description: Catheter Ablation Atrial Fibrillation;  Recorded: 07/31/2012;  Comments: 7/24/12 PVI with Dr. Gardiner and nilay to all 5 pulm veins and CTI fl ablation line as well.    ZZC SUPRACERV ABD HYSTERECTOMY      Description: Supracervical Hysterectomy;  Proc Date: 01/01/1985;  Comments: some cervix left!;  ovaries intact; done for bleeding     Family History   Problem Relation Age of Onset    Heart Failure Mother     Cancer Other         paternal HX-laryngeal     Alcoholism Sister     No Known Problems Daughter     No Known Problems Maternal Grandmother     No Known Problems Maternal Grandfather     No Known Problems Paternal Grandmother     No Known Problems Paternal Grandfather     No Known Problems Maternal Aunt     No Known Problems Paternal Aunt     Alcoholism Sister     Alcoholism Brother     Alcoholism Father     Cancer Paternal Uncle         Gastric-Alcohol    Cancer Paternal Uncle         gastric-Alcohol    Hereditary Breast and Ovarian Cancer Syndrome No family hx of     Breast Cancer No family hx of     Colon Cancer No family hx of     Endometrial Cancer No family hx of     Ovarian Cancer No family hx of         Social History     Socioeconomic History    Marital status:      Spouse name: Not on file    Number of children: Not on file    Years of education: Not on file    Highest education level: Not on file   Occupational History    Not on file   Tobacco Use    Smoking status: Every Day     Packs/day: 0.25     Types: Cigarettes    Smokeless tobacco: Never    Tobacco comments:     seen by TTS inpatient on 3/31/22   Substance and Sexual Activity    Alcohol use: Yes     Comment: Alcoholic Drinks/day: very little    Drug use: No    Sexual activity: Not on file   Other Topics Concern    Not on file   Social History Narrative    Not on file     Social Determinants of Health     Financial Resource Strain: Not on file   Food Insecurity: Not on file   Transportation Needs: Not on file   Physical Activity: Not on file   Stress: Not on file   Social Connections: Not on file   Intimate Partner Violence: Not on file   Housing Stability: Not on file           Medications  Allergies   Current Outpatient Medications   Medication Sig Dispense Refill    ACCU-CHEK SOFTCLIX LANCETS lancets [ACCU-CHEK SOFTCLIX LANCETS  "LANCETS] TEST THREE TIMES DAILY 300 each 3    albuterol (PROAIR HFA/PROVENTIL HFA/VENTOLIN HFA) 108 (90 Base) MCG/ACT inhaler INHALE 2 PUFFS BY MOUTH EVERY 4 HOURS AS NEEDED FOR WHEEZING 54 g 1    apixaban ANTICOAGULANT (ELIQUIS) 5 MG tablet Take 1 tablet (5 mg) by mouth 2 times daily 180 tablet 2    atorvastatin (LIPITOR) 20 MG tablet TAKE 1 TABLET(20 MG) BY MOUTH AT BEDTIME 90 tablet 3    BD ULTRA-FINE SHORT PEN NEEDLE 31 gauge x 5/16\" Ndle [BD ULTRA-FINE SHORT PEN NEEDLE 31 GAUGE X 5/16\" NDLE] TEST FOUR TIMES DAILY WITH MEALS AND AT BEDTIME 400 each 3    blood-glucose meter (ONETOUCH VERIO IQ METER) Misc [BLOOD-GLUCOSE METER (ONETOUCH VERIO IQ METER) MISC] Check blood sugar three times a day. 1 each 0    budesonide-formoterol (SYMBICORT) 160-4.5 MCG/ACT Inhaler Inhale 2 puffs into the lungs 2 times daily 10.2 g 4    diaper,brief,adult,disposable (ADULT BRIEFS - LARGE) Misc [DIAPER,BRIEF,ADULT,DISPOSABLE (ADULT BRIEFS - LARGE) MISC] Use 3-4 daily as needed for incontinence 120 each 6    digoxin (LANOXIN) 125 MCG tablet TAKE 1 TABLET(125 MCG) BY MOUTH DAILY 90 tablet 2    diltiazem ER COATED BEADS (CARDIZEM CD/CARTIA XT) 120 MG 24 hr capsule TAKE 1 CAPSULE(120 MG) BY MOUTH DAILY 90 capsule 2    furosemide (LASIX) 20 MG tablet TAKE 1 TABLET(20 MG) BY MOUTH TWICE DAILY 60 tablet 0    gabapentin (NEURONTIN) 600 MG tablet Take 1 tablet (600 mg) by mouth 3 times daily 270 tablet 2    generic lancets (FINGERSTIX LANCETS) [GENERIC LANCETS (FINGERSTIX LANCETS)] Dispense brand per patient's insurance at pharmacy discretion. 300 each 0    guaiFENesin-codeine (ROBITUSSIN AC) 100-10 MG/5ML solution Take 5-10 mLs by mouth every 4 hours as needed for cough 120 mL 0    ipratropium - albuterol 0.5 mg/2.5 mg/3 mL (DUONEB) 0.5-2.5 (3) MG/3ML neb solution Take 1 vial (3 mLs) by nebulization every 6 hours as needed for shortness of breath or wheezing 90 mL 0    JARDIANCE 10 MG TABS tablet TAKE 1 TABLET(10 MG) BY MOUTH DAILY 90 tablet " "2    magnesium oxide (MAG-OX) 400 MG tablet Take 1 tablet (400 mg) by mouth daily 30 tablet 0    meclizine (ANTIVERT) 25 MG tablet TAKE 1 TABLET(25 MG) BY MOUTH THREE TIMES DAILY AS NEEDED FOR DIZZINESS OR NAUSEA 45 tablet 5    metoprolol tartrate (LOPRESSOR) 25 MG tablet TAKE 1 TABLET(25 MG) BY MOUTH TWICE DAILY 180 tablet 2    naloxone (NARCAN) 4 MG/0.1ML nasal spray Spray 1 spray (4 mg) into one nostril alternating nostrils once as needed for opioid reversal every 2-3 minutes until assistance arrives 0.2 mL 0    nicotine (NICOTROL) 10 MG inhaler Use 1 cartridge as needed for urge to smoke by puffing over course of 20min.  Use 6-16 cart/day; reduce number of cart/day over 6-12 weeks. 168 each 1    nystatin (NYSTOP) powder [NYSTATIN (NYSTOP) POWDER] Apply 1 application topically 2 (two) times a day as needed. 2-3 times to affected area(s). 60 g 3    NYSTOP 218678 UNIT/GM powder APPLY TOPICALLY TO THE AFFECTED AREA 2-3 TIMES DAILY AS NEEDED 60 g 3    omeprazole (PRILOSEC) 20 MG DR capsule Take 20 mg by mouth daily      ONETOUCH VERIO IQ test strip USE TO TEST THREE TIMES DAILY 300 strip 1    oxyCODONE IR (ROXICODONE) 10 MG tablet Take 1 tablet (10 mg) by mouth every 6 hours as needed for moderate to severe pain 120 tablet 0    potassium chloride ER (KLOR-CON M) 20 MEQ CR tablet TAKE 1 TABLET(20 MEQ) BY MOUTH DAILY 30 tablet 0    sucralfate (CARAFATE) 1 GM tablet TAKE 1 TABLET BY MOUTH FOUR TIMES DAILY. CRUSH TABLET AND MIX IT WITH A LITTLE WATER THEN SWALLOW 360 tablet 3    triamcinolone (KENALOG) 0.1 % external cream Apply topically 2 times daily Apply topically sparingly BID to lower extremity rash 45 g 1       Allergies   Allergen Reactions    Celebrex [Celecoxib] Rash     patient had butterfly rash - \"lupus-like\"      Latex Rash          Lab Results    Chemistry/lipid CBC Cardiac Enzymes/BNP/TSH/INR   Recent Labs   Lab Test 07/25/22  1026   CHOL 151   HDL 84   LDL 53   TRIG 72     Recent Labs   Lab Test " 07/25/22  1026 12/24/21  0756 07/21/21  0911   LDL 53 77 72     Recent Labs   Lab Test 06/30/23  1352      POTASSIUM 4.6   CHLORIDE 103   CO2 27      BUN 16   CR 0.71   GFRESTIMATED 89   JOEY 9.8     Recent Labs   Lab Test 06/30/23  1352 06/03/23  0615 06/02/23  0057   CR 0.71 0.77 0.79     Recent Labs   Lab Test 05/25/23  0507 07/25/22  1026 12/24/21  0756   A1C 6.1* 5.8* 6.1*          Recent Labs   Lab Test 06/30/23  1352   WBC 6.9   HGB 12.9   HCT 44.9   MCV 92        Recent Labs   Lab Test 06/30/23  1352 06/03/23  0615 06/02/23  0057   HGB 12.9 8.3* 9.4*    Recent Labs   Lab Test 05/24/23  1322 05/24/23  1015 02/08/23  0759   TROPONINI 0.07 0.08 0.09     Recent Labs   Lab Test 06/02/23  0057 05/24/23  1015 02/08/23  0759   * 176* 867*     Recent Labs   Lab Test 05/24/23  1015   TSH 1.17     Recent Labs   Lab Test 02/08/23  0759 01/18/23  0522 01/17/23  0445   INR 1.34* 1.35* 1.37*        Moises Valencia MD

## 2023-08-03 NOTE — PROGRESS NOTES
Valve Clinic - Aortic stenosis  Referring provider: Dr. Loya    See valve clinic consult note from Dr. Valencia     5 Meter Walk Test:  Pt not able to walk    Preliminary STS Score: 1.6%    Past Medical History: Severe aortic stenosis, mild-mod AR, mild MR, paroxysmal afib s/p PVI in 2012 (Eliquis/dilt), HTN, HLD, COPD, pleural effusionsDM II, osteoarthritis, fibromyalgia/chronic pain, GERD, obesity, Smoker     Symptoms: SOB w exertion    Social: son Heriberto involved    Plan: Pt would like to think about continuing TAVR workup. Information given to pt and direct phone number to call when decision is made.    No further questions at this time. Patient has my direct contact information and was encouraged to call with questions or concerns.     Jennifer Ospina RN BSN  Structural Heart Coordinator   [facility]  [phone]

## 2023-08-08 LAB
ATRIAL RATE - MUSE: 62 BPM
DIASTOLIC BLOOD PRESSURE - MUSE: NORMAL MMHG
INTERPRETATION ECG - MUSE: NORMAL
P AXIS - MUSE: 82 DEGREES
PR INTERVAL - MUSE: 192 MS
QRS DURATION - MUSE: 110 MS
QT - MUSE: 380 MS
QTC - MUSE: 385 MS
R AXIS - MUSE: -54 DEGREES
SYSTOLIC BLOOD PRESSURE - MUSE: NORMAL MMHG
T AXIS - MUSE: 86 DEGREES
VENTRICULAR RATE- MUSE: 62 BPM

## 2023-08-12 DIAGNOSIS — J96.22 ACUTE AND CHRONIC RESPIRATORY FAILURE WITH HYPERCAPNIA (H): ICD-10-CM

## 2023-08-12 DIAGNOSIS — I50.33 ACUTE ON CHRONIC DIASTOLIC HEART FAILURE (H): ICD-10-CM

## 2023-08-12 RX ORDER — POTASSIUM CHLORIDE 1500 MG/1
TABLET, EXTENDED RELEASE ORAL
Qty: 90 TABLET | Refills: 3 | Status: SHIPPED | OUTPATIENT
Start: 2023-08-12 | End: 2024-07-26

## 2023-08-12 RX ORDER — FUROSEMIDE 20 MG
TABLET ORAL
Qty: 180 TABLET | Refills: 3 | Status: ON HOLD | OUTPATIENT
Start: 2023-08-12 | End: 2024-02-10

## 2023-08-16 ENCOUNTER — OFFICE VISIT (OUTPATIENT)
Dept: FAMILY MEDICINE | Facility: CLINIC | Age: 73
End: 2023-08-16
Payer: COMMERCIAL

## 2023-08-16 ENCOUNTER — MEDICAL CORRESPONDENCE (OUTPATIENT)
Dept: HEALTH INFORMATION MANAGEMENT | Facility: CLINIC | Age: 73
End: 2023-08-16

## 2023-08-16 VITALS
WEIGHT: 117 LBS | SYSTOLIC BLOOD PRESSURE: 108 MMHG | HEIGHT: 66 IN | BODY MASS INDEX: 18.8 KG/M2 | HEART RATE: 52 BPM | DIASTOLIC BLOOD PRESSURE: 55 MMHG | OXYGEN SATURATION: 90 % | TEMPERATURE: 97.3 F

## 2023-08-16 DIAGNOSIS — E43 SEVERE PROTEIN-CALORIE MALNUTRITION (H): ICD-10-CM

## 2023-08-16 DIAGNOSIS — M79.7 FIBROMYALGIA: ICD-10-CM

## 2023-08-16 DIAGNOSIS — E11.42 DIABETIC POLYNEUROPATHY ASSOCIATED WITH TYPE 2 DIABETES MELLITUS (H): ICD-10-CM

## 2023-08-16 DIAGNOSIS — M54.6 TRIGGER POINT OF THORACIC REGION: Primary | ICD-10-CM

## 2023-08-16 DIAGNOSIS — I48.91 ATRIAL FIBRILLATION, UNSPECIFIED TYPE (H): ICD-10-CM

## 2023-08-16 DIAGNOSIS — J44.0 CHRONIC OBSTRUCTIVE PULMONARY DISEASE WITH ACUTE LOWER RESPIRATORY INFECTION (H): ICD-10-CM

## 2023-08-16 DIAGNOSIS — G89.4 CHRONIC PAIN SYNDROME: ICD-10-CM

## 2023-08-16 DIAGNOSIS — F11.20 CONTINUOUS OPIOID DEPENDENCE (H): ICD-10-CM

## 2023-08-16 DIAGNOSIS — J43.9 PULMONARY EMPHYSEMA, UNSPECIFIED EMPHYSEMA TYPE (H): ICD-10-CM

## 2023-08-16 PROBLEM — E46 PROTEIN-CALORIE MALNUTRITION (H): Status: ACTIVE | Noted: 2023-08-16

## 2023-08-16 PROCEDURE — 20553 NJX 1/MLT TRIGGER POINTS 3/>: CPT | Performed by: FAMILY MEDICINE

## 2023-08-16 PROCEDURE — 99214 OFFICE O/P EST MOD 30 MIN: CPT | Mod: 25 | Performed by: FAMILY MEDICINE

## 2023-08-16 RX ORDER — OXYCODONE HYDROCHLORIDE 10 MG/1
10 TABLET ORAL EVERY 6 HOURS PRN
Qty: 120 TABLET | Refills: 0 | Status: SHIPPED | OUTPATIENT
Start: 2023-08-16 | End: 2023-09-20

## 2023-08-16 RX ORDER — ALBUTEROL SULFATE 90 UG/1
2 AEROSOL, METERED RESPIRATORY (INHALATION) EVERY 4 HOURS PRN
Qty: 54 G | Refills: 1 | Status: CANCELLED | OUTPATIENT
Start: 2023-08-16

## 2023-08-16 RX ORDER — ALBUTEROL SULFATE 90 UG/1
2 AEROSOL, METERED RESPIRATORY (INHALATION) EVERY 4 HOURS PRN
Qty: 54 G | Refills: 1 | Status: SHIPPED | OUTPATIENT
Start: 2023-08-16 | End: 2023-11-15

## 2023-08-16 NOTE — LETTER
Opioid / Opioid Plus Controlled Substance Agreement    This is an agreement between you and your provider about the safe and appropriate use of controlled substance/opioids prescribed by your care team. Controlled substances are medicines that can cause physical and mental dependence (abuse).    There are strict laws about having and using these medicines. We here at [facility] are committing to working with you in your efforts to get better. To support you in this work, we ll help you schedule regular office appointments for medicine refills. If we must cancel or change your appointment for any reason, we ll make sure you have enough medicine to last until your next appointment.     As a Provider, I will:  Listen carefully to your concerns and treat you with respect.   Recommend a treatment plan that I believe is in your best interest. This plan may involve therapies other than opioid pain medication.   Talk with you often about the possible benefits, and the risk of harm of any medicine that we prescribe for you.   Provide a plan on how to taper (discontinue or go off) using this medicine if the decision is made to stop its use.    As a Patient, I understand that opioid(s):   Are a controlled substance prescribed by my care team to help me function or work and manage my condition(s).   Are strong medicines and can cause serious side effects such as:  Drowsiness, which can seriously affect my driving ability  A lower breathing rate, enough to cause death  Harm to my thinking ability   Depression   Abuse of and addiction to this medicine  Need to be taken exactly as prescribed. Combining opioids with certain medicines or chemicals (such as illegal drugs, sedatives, sleeping pills, and benzodiazepines) can be dangerous or even fatal. If I stop opioids suddenly, I may have severe withdrawal symptoms.  Do not work for all types of pain nor for all patients. If they re not helpful, I may be asked to stop  them.        The risks, benefits and side effects of these medicine(s) were explained to me. I agree that:  I will take part in other treatments as advised by my care team. This may be psychiatry or counseling, physical therapy, behavioral therapy, group treatment or a referral to a specialist.     I will keep all my appointments. I understand that this is part of the monitoring of opioids. My care team may require an office visit for EVERY opioid/controlled substance refill. If I miss appointments or don t follow instructions, my care team may stop my medicine.    I will take my medicines as prescribed. I will not change the dose or schedule unless my care team tells me to. There will be no refills if I run out early.     I may be asked to come to the clinic and complete a urine drug test or complete a pill count at any time. If I don t give a urine sample or participate in a pill count, the care team may stop my medicine.    I will only receive prescriptions from this clinic for chronic pain. If I am treated by another provider for acute pain issues, I will tell them that I am taking opioid pain medication for chronic pain and that I have a treatment agreement with this provider. I will inform my [facility] care team within one business day if I am given a prescription for any pain medication by another healthcare provider. My [facility] care team can contact other providers and pharmacists about my use of any medicines.    It is up to me to make sure that I don t run out of my medicines on weekends or holidays. If my care team is willing to refill my opioid prescription without a visit, I must request refills only during office hours. Refills may take up to 3 business days to process. I will use one pharmacy to fill all my opioid and other controlled substance prescriptions. I will notify the clinic about any changes to my insurance or medication availability.    I am responsible for my  prescriptions. If the medicine/prescription is lost, stolen or destroyed, it will not be replaced. I also agree not to share controlled substance medicines with anyone.    I am aware I should not use any illegal or recreational drugs. I agree not to drink alcohol unless my care team says I can.       If I enroll in the [place] Medical Cannabis program, I will tell my care team prior to my next refill.     I will tell my care team right away if I become pregnant, have a new medical problem treated outside of my regular clinic, or have a change in my medications.    I understand that this medicine can affect my thinking, judgment and reaction time. Alcohol and drugs affect the brain and body, which can affect the safety of my driving. Being under the influence of alcohol or drugs can affect my decision-making, behaviors, personal safety, and the safety of others. Driving while impaired (DWI) can occur if a person is driving, operating, or in physical control of a car, motorcycle, boat, snowmobile, ATV, motorbike, off-road vehicle, or any other motor vehicle ([place] Statute 169A.20). I understand the risk if I choose to drive or operate any vehicle or machinery.    I understand that if I do not follow any of the conditions above, my prescriptions or treatment may be stopped or changed.          Opioids  What You Need to Know    What are opioids?   Opioids are pain medicines that must be prescribed by a doctor. They are also known as narcotics.     Examples are:   morphine (MS Contin, Yudelka)  oxycodone (Oxycontin)  oxycodone and acetaminophen (Percocet)  hydrocodone and acetaminophen (Vicodin, Norco)   fentanyl patch (Duragesic)   hydromorphone (Dilaudid)   methadone  codeine (Tylenol #3)     What do opioids do well?   Opioids are best for severe short-term pain such as after a surgery or injury. They may work well for cancer pain. They may help some people with long-lasting (chronic) pain.     What do opioids NOT do  well?   Opioids never get rid of pain entirely, and they don t work well for most patients with chronic pain. Opioids don t reduce swelling, one of the causes of pain.                                    Other ways to manage chronic pain and improve function include:     Treat the health problem that may be causing pain  Anti-inflammation medicines, which reduce swelling and tenderness, such as ibuprofen (Advil, Motrin) or naproxen (Aleve)  Acetaminophen (Tylenol)  Antidepressants and anti-seizure medicines, especially for nerve pain  Topical treatments such as patches or creams  Injections or nerve blocks  Chiropractic or osteopathic treatment  Acupuncture, massage, deep breathing, meditation, visual imagery, aromatherapy  Use heat or ice at the pain site  Physical therapy   Exercise  Stop smoking  Take part in therapy       Risks and side effects     Talk to your doctor before you start or decide to keep taking opioids. Possible side effects include:    Lowering your breathing rate enough to cause death  Overdose, including death, especially if taking higher than prescribed doses  Worse depression symptoms; less pleasure in things you usually enjoy  Feeling tired or sluggish  Slower thoughts or cloudy thinking  Being more sensitive to pain over time; pain is harder to control  Trouble sleeping or restless sleep  Changes in hormone levels (for example, less testosterone)  Changes in sex drive or ability to have sex  Constipation  Unsafe driving  Itching and sweating  Dizziness  Nausea, throwing up and dry mouth    What else should I know about opioids?    Opioids may lead to dependence, tolerance, or addiction.    Dependence means that if you stop or reduce the medicine too quickly, you will have withdrawal symptoms. These include loose poop (diarrhea), jitters, flu-like symptoms, nervousness and tremors. Dependence is not the same as addiction.                     Tolerance means needing higher doses over time to  get the same effect. This may increase the chance of serious side effects.    Addiction is when people improperly use a substance that harms their body, their mind or their relations with others. Use of opiates can cause a relapse of addiction if you have a history of drug or alcohol abuse.    People who have used opioids for a long time may have a lower quality of life, worse depression, higher levels of pain and more visits to doctors.    You can overdose on opioids. Take these steps to lower your risk of overdose:    Recognize the signs:  Signs of overdose include decrease or loss of consciousness (blackout), slowed breathing, trouble waking up and blue lips. If someone is worried about overdose, they should call 911.    Talk to your doctor about Narcan (naloxone).   If you are at risk for overdose, you may be given a prescription for Narcan. This medicine very quickly reverses the effects of opioids.   If you overdose, a friend or family member can give you Narcan while waiting for the ambulance. They need to know the signs of overdose and how to give Narcan.     Don't use alcohol or street drugs.   Taking them with opioids can cause death.    Do not take any of these medicines unless your doctor says it s OK. Taking these with opioids can cause death:  Benzodiazepines, such as lorazepam (Ativan), alprazolam (Xanax) or diazepam (Valium)  Muscle relaxers, such as cyclobenzaprine (Flexeril)  Sleeping pills like zolpidem (Ambien)   Other opioids      How to keep you and other people safe while taking opioids:    Never share your opioids with others.  Opioid medicines are regulated by the Drug Enforcement Agency (GAL). Selling or sharing medications is a criminal act.    2. Be sure to store opioids in a secure place, locked up if possible. Young children can easily swallow them and overdose.    3. When you are traveling with your medicines, keep them in the original bottles. If you use a pill box, be sure you also  carry a copy of your medicine list from your clinic or pharmacy.    4. Safe disposal of opioids    Most pharmacies have places to get rid of medicine, called disposal kiosks. Medicine disposal options are also available in every [facility]. Search your county and  medication disposal  to find more options. You can find more details at:  [email or web address]-[email or web address]-medications     I agree that my provider, clinic care team, and pharmacy may work with any city, state or federal law enforcement agency that investigates the misuse, sale, or other diversion of my controlled medicine. I will allow my provider to discuss my care with, or share a copy of, this agreement with any other treating provider, pharmacy or emergency room where I receive care.    I have read this agreement and have asked questions about anything I did not understand.    _______________________________________________________  Patient Signature - Mary Kay Tejada _____________________                   Date     _______________________________________________________  Provider Signature - SAVANA SILVER MD   _____________________                   Date     _______________________________________________________  Witness Signature (required if provider not present while patient signing)   _____________________                   Date

## 2023-08-16 NOTE — PROGRESS NOTES
"  1. Trigger point of thoracic region  This is a 72 yo female with fibromyalgias/trigger points.  Has benefited from trigger point injections in past - repeated today   Procedures  Procedure Note - Trigger Point Injections    Consent obtained: verbal    Indication:  fibromyalgia/trigger points    6 trigger points identified.  Each trigger point swabbed x 3 with Betadine swab.  Each trigger point then injected with 1.6 ml of 1% Lidocaine (no epi).    Total volume injected:  10 ml    Complications:  None, patient tolerated procedure well.    Plan:  Discussed care with patient.  RTC for further injections in 30 days prn.      - lidocaine 1 % 10 mL    2. Atrial fibrillation, unspecified type (H)  On chronic anticoagulation - INR today     3. Pulmonary emphysema, unspecified emphysema type (H)  Patient notes her respiratory status is \"better\"/stable     4. Diabetic polyneuropathy associated with type 2 diabetes mellitus (H)  Declines lab follow up today -     5. Chronic obstructive pulmonary disease with acute lower respiratory infection (H)  Desires refill on Albuterol - reviewed - refilled -   - albuterol (PROAIR HFA/PROVENTIL HFA/VENTOLIN HFA) 108 (90 Base) MCG/ACT inhaler; Inhale 2 puffs into the lungs every 4 hours as needed for wheezing  Dispense: 54 g; Refill: 1    6. Chronic pain syndrome  As above - unable to tolerate NSAIDs due to recurrent gastric ulcers with life threatening bleeding - continues to use Oxycodone as needed - refilled today   - oxyCODONE IR (ROXICODONE) 10 MG tablet; Take 1 tablet (10 mg) by mouth every 6 hours as needed for moderate to severe pain  Dispense: 120 tablet; Refill: 0    7. Fibromyalgia  As above -   - oxyCODONE IR (ROXICODONE) 10 MG tablet; Take 1 tablet (10 mg) by mouth every 6 hours as needed for moderate to severe pain  Dispense: 120 tablet; Refill: 0    8. Severe protein-calorie malnutrition (H)  Patient has had several hospitalizations in recent months - starting to recover " "more physically - notes that she is supposed to have aortic valve replacement - I have encouraged this.      9. F11.2 - Continuous opioid dependence (H)  As above - will sign controlled substance agreement -       Carol Muñiz is a 73 year old, presenting for the following health issues:  Trigger Point Injection        8/16/2023     8:50 AM   Additional Questions   Roomed by Mimi       Worried about valvular heart disease - is trying to decide if she wants to go through with this  We reviewed what this involved -     As well - having \"a lot\" of pain in posterior neck-upper back -   (6 trigger points identified and injected today)    History of Present Illness       Reason for visit:  Trigger Point Injection, med refills          Review of Systems   Constitutional:  Positive for unexpected weight change (malnutrition). Negative for chills and fever.   HENT: Negative.     Eyes:  Negative for visual disturbance.   Respiratory:  Positive for cough and shortness of breath.    Cardiovascular:  Negative for chest pain and peripheral edema.   Gastrointestinal:  Positive for abdominal pain (epigastric).   Endocrine: Negative for polydipsia and polyuria.   Genitourinary: Negative.    Musculoskeletal: Negative.    Skin: Negative.    Allergic/Immunologic: Negative.    Neurological:  Positive for weakness.   Psychiatric/Behavioral: Negative.  Positive for mood changes.    All other systems reviewed and are negative.           Objective    /55 (BP Location: Left arm, Patient Position: Sitting, Cuff Size: Adult Small)   Pulse 52   Temp 97.3  F (36.3  C) (Temporal)   Ht 1.67 m (5' 5.75\")   Wt 53.1 kg (117 lb)   SpO2 90%   BMI 19.03 kg/m    Body mass index is 19.03 kg/m .  Physical Exam  Vitals reviewed.   Constitutional:       General: She is not in acute distress.     Appearance: Normal appearance. She is ill-appearing (chronically ill appearing).   HENT:      Head: Normocephalic.      Right Ear: Tympanic " membrane, ear canal and external ear normal.      Left Ear: Tympanic membrane, ear canal and external ear normal.      Nose: Nose normal.      Mouth/Throat:      Mouth: Mucous membranes are moist.      Pharynx: No posterior oropharyngeal erythema.   Eyes:      Extraocular Movements: Extraocular movements intact.      Conjunctiva/sclera: Conjunctivae normal.      Pupils: Pupils are equal, round, and reactive to light.   Cardiovascular:      Rate and Rhythm: Normal rate and regular rhythm.      Pulses: Normal pulses.      Heart sounds: Murmur heard.   Pulmonary:      Effort: Pulmonary effort is normal.      Breath sounds: Normal breath sounds.   Abdominal:      Palpations: Abdomen is soft. There is no mass.      Tenderness: There is no abdominal tenderness. There is no guarding or rebound.   Musculoskeletal:         General: No deformity. Normal range of motion.      Cervical back: Normal range of motion and neck supple.      Comments: Multiple trigger points - today,  paracervial/upper thoracic are most symptomatic   Lymphadenopathy:      Cervical: No cervical adenopathy.   Skin:     General: Skin is warm and dry.   Neurological:      General: No focal deficit present.      Mental Status: She is alert.   Psychiatric:         Mood and Affect: Mood normal.         Behavior: Behavior normal.            No results found for any visits on 08/16/23.

## 2023-08-18 ENCOUNTER — PATIENT OUTREACH (OUTPATIENT)
Dept: GERIATRIC MEDICINE | Facility: CLINIC | Age: 73
End: 2023-08-18
Payer: COMMERCIAL

## 2023-08-18 ASSESSMENT — ACTIVITIES OF DAILY LIVING (ADL): DEPENDENT_IADLS:: CLEANING;COOKING;LAUNDRY;SHOPPING;MEAL PREPARATION;TRANSPORTATION

## 2023-08-18 NOTE — PROGRESS NOTES
[facility] Care Coordination Contact    Received after visit chart from care coordinator.  Completed following tasks: Mailed copy of care plan to client, Mailed Safe Medication Disposal , Mailed [facility] Leave Behind Letter, Submitted referrals/auths for PCA, [facility] and PERS, and Updated services in Database  , Provider Signature - No POC Shared:  Member indicates that they do not want their POC shared with any EW providers.    , and [employer]:  Faxed completed PCA assessment to PCA Agency and mailed copies to member.  Faxed MD Communication to PCP.  Emailed referral form for auth to [employer].    Esha Gerardo  Care Management Specialist  [facility]  [phone]

## 2023-08-18 NOTE — LETTER
August 18, 2023    Important [employer] Information    MARY KAY HINDS  [address]  [place] [facility] [place] [address]  Your Care Plan  Dear Mary Kay,  When we spoke recently, I promised to send you a Care Plan. The plan enclosed is a summary of our discussion. It includes the steps we agreed would help you meet your health goals. In addition, I can help you with:  [id number]  This program is available to members who need a ride to medical and dental visits. To schedule a ride, call [phone] or [phone] (toll free). TTY: 711. You can call Monday - Thursday 8 a.m. to 5 p.m. and Fridays 9 a.m. to 5 p.m.  One Pass  One Pass is your no-cost, complete, fitness solution for your mind and body. To learn more visit [employer]/fitness or call One Pass, toll-free 1 [phone] (TTY: 71) 8 a.m. to 9 p.m. Monday-Friday.  Health Care Directive   This form helps you outline your health care wishes. You can request a form from me and I will answer any questions you have before you discuss it with your doctor.   Annual Physical  Take a key step on your path to good health and set up an annual physical at your clinic.  Questions?  Call me at [phone] Monday-Friday between 8am and 5pm.  TTY: 711. As we discussed, I plan to be in touch with you again in 6 months to follow up via phone.  Sincerely,    Samantha Alexandra RN, PHN  [phone]  [email or web address]    cc: member records

## 2023-08-18 NOTE — PROGRESS NOTES
[facility] Care Coordination Contact    [facility] Home Visit Assessment     Home visit for Health Risk Assessment with Mary Kay Tejada completed on August 2, 2023    Type of residence:: Private home - stairs  Current living arrangement:: I live in a private home with family     Assessment completed with:: Patient, Care Team Member    Current Care Plan  Member currently receiving the following home care services:     Member currently receiving the following community resources: County Worker, [facility], PCA, Housekeeping/Chore Agency      Medication Review  Medication reconciliation completed in Epic: No--completed by clinic staff  Medication set-up & administration: Independent and sets up on own weekly.  Self-administers medications.  Medication Risk Assessment Medication (1 or more, place referral to MTM): N/A: No risk factors identified  MTM Referral Placed: No: No risk factors idenified    Mental/Behavioral Health   Depression Screening:   PHQ-2 Total Score (Adult) - Positive if 3 or more points; Administer PHQ-9 if positive: 0       Mental health DX:: Yes   Mental health DX how managed:: None    Falls Assessment:   Fallen 2 or more times in the past year?: No   Any fall with injury in the past year?: No    ADL/IADL Dependencies:   Dependent ADLs:: Bathing, Dressing, Grooming  Dependent IADLs:: Cleaning, Cooking, Laundry, Shopping, Meal Preparation, Transportation    [facility] sponsored benefits: Shared information re: Silver Sneakers/gym memberships, ASA, Calcium +D.    PCA Assessment completed at visit: Yes Annual PCA assessment indicated 2.25 hours per day of PCA. This is the same as the previous assessment.     Elderly Waiver Eligibility: Yes-will continue on EW    Care Plan & Recommendations: Member will continue to reside at home with PCA and homemaking services and life alert. Family will assists informally as needed.    Member is due for cholesterol check, A1C and eye exam. She  will schedule appointments.     See [facility] for detailed assessment information.    Follow-Up Plan: Member informed of future contact, plan to f/u with member with a 6 month telephone assessment.  Contact information shared with member and encouraged to call with any questions or concerns at any time.    [facility] care continuum providers: Please see Snapshot and Care Management Flowsheets for Specific details of care plan.    This CC note routed to PCP, Cammy Bui RN, PHN   [facility]  [phone]

## 2023-08-20 ASSESSMENT — ENCOUNTER SYMPTOMS
FEVER: 0
POLYDIPSIA: 0
SHORTNESS OF BREATH: 1
CHILLS: 0
WEAKNESS: 1
ALLERGIC/IMMUNOLOGIC NEGATIVE: 1
COUGH: 1
MUSCULOSKELETAL NEGATIVE: 1
ABDOMINAL PAIN: 1
PSYCHIATRIC NEGATIVE: 1
UNEXPECTED WEIGHT CHANGE: 1

## 2023-08-21 ENCOUNTER — TELEPHONE (OUTPATIENT)
Dept: CARDIOLOGY | Facility: CLINIC | Age: 73
End: 2023-08-21
Payer: COMMERCIAL

## 2023-08-21 DIAGNOSIS — I35.0 NONRHEUMATIC AORTIC VALVE STENOSIS: ICD-10-CM

## 2023-08-21 DIAGNOSIS — I51.89 OTHER ILL-DEFINED HEART DISEASES: Primary | ICD-10-CM

## 2023-08-21 NOTE — TELEPHONE ENCOUNTER
Pt called the valve clinic to discuss TAVR workup. Pt would like to move ahead. Pt states they have dentures. Pt will need to move ahead with coronary angiogram followed by CV surgery consult. Pt was informed if their coronary angiogram falls 30 days outside their visit with Dr. Valencia, they will need to be seen by there PCP for a new pre-op. Patient verbalizes understanding and agrees with plan. No further questions or concerns at this time.

## 2023-08-29 ENCOUNTER — HOSPITAL ENCOUNTER (OUTPATIENT)
Facility: HOSPITAL | Age: 73
End: 2023-08-29
Attending: INTERNAL MEDICINE | Admitting: INTERNAL MEDICINE
Payer: COMMERCIAL

## 2023-08-29 DIAGNOSIS — I35.0 NONRHEUMATIC AORTIC VALVE STENOSIS: ICD-10-CM

## 2023-08-29 DIAGNOSIS — I51.89 OTHER ILL-DEFINED HEART DISEASES: ICD-10-CM

## 2023-08-30 NOTE — TELEPHONE ENCOUNTER
From: Ammy Lord  Sent: 8/29/2023   9:17 AM CDT  To: Jennifer Ospina RN    Pt hasn't been feeling well due to her arthritis so she would like to wait until October.  10/10 admit at 9 with [facility] AM of procedure  10/12 CTS

## 2023-09-01 DIAGNOSIS — J44.1 COPD EXACERBATION (H): ICD-10-CM

## 2023-09-01 NOTE — TELEPHONE ENCOUNTER
"Routing refill request to provider for review/approval because:  Drug not active on patient's medication list    Last Written Prescription Date:  6/5/2023  Last Fill Quantity: 28 each ,  # refills: 0  Discontinued 7/5/2023     Last office visit provider:  8/16/2023     Requested Prescriptions   Pending Prescriptions Disp Refills    BREO ELLIPTA 200-25 MCG/ACT inhaler [Pharmacy Med Name: BREO ELLIPTA 200-25MCG ORAL INH(30)]       Sig: INHALE 1 PUFF INTO THE LUNGS DAILY       Inhaled Steroids Protocol Failed - 9/1/2023 10:15 AM        Failed - Medication is active on med list        Passed - Patient is age 12 or older        Passed - Recent (12 mo) or future (30 days) visit within the authorizing provider's specialty     Patient has had an office visit with the authorizing provider or a provider within the authorizing providers department within the previous 12 mos or has a future within next 30 days. See \"Patient Info\" tab in inbasket, or \"Choose Columns\" in Meds & Orders section of the refill encounter.                   Niecy Pearson RN 09/01/23 1:01 PM  "

## 2023-09-02 RX ORDER — FLUTICASONE FUROATE AND VILANTEROL TRIFENATATE 200; 25 UG/1; UG/1
1 POWDER RESPIRATORY (INHALATION) DAILY
Qty: 90 EACH | Refills: 3 | Status: SHIPPED | OUTPATIENT
Start: 2023-09-02 | End: 2023-09-24

## 2023-09-19 ENCOUNTER — PATIENT OUTREACH (OUTPATIENT)
Dept: GERIATRIC MEDICINE | Facility: CLINIC | Age: 73
End: 2023-09-19
Payer: COMMERCIAL

## 2023-09-19 NOTE — PROGRESS NOTES
[facility] Care Coordination Contact     CHW, spoke w/ Mbr about colon screening, wellness and diabetic eye exams. Mbr is having pre- op on 9/20/23 will will follow up w/ PCP on scheduling of colon and  wellness exams. Mbr, will schedule eye exam  at later date after procedure.      KIANNA Carbajal  [facility]  [phone]

## 2023-09-20 ENCOUNTER — OFFICE VISIT (OUTPATIENT)
Dept: FAMILY MEDICINE | Facility: CLINIC | Age: 73
End: 2023-09-20
Payer: COMMERCIAL

## 2023-09-20 VITALS
SYSTOLIC BLOOD PRESSURE: 111 MMHG | RESPIRATION RATE: 20 BRPM | WEIGHT: 121 LBS | HEART RATE: 56 BPM | DIASTOLIC BLOOD PRESSURE: 58 MMHG | OXYGEN SATURATION: 93 % | BODY MASS INDEX: 19.44 KG/M2 | HEIGHT: 66 IN | TEMPERATURE: 97.2 F

## 2023-09-20 DIAGNOSIS — K27.9 PEPTIC ULCER: ICD-10-CM

## 2023-09-20 DIAGNOSIS — G89.4 CHRONIC PAIN SYNDROME: ICD-10-CM

## 2023-09-20 DIAGNOSIS — I48.91 ATRIAL FIBRILLATION, UNSPECIFIED TYPE (H): Primary | ICD-10-CM

## 2023-09-20 DIAGNOSIS — M79.7 FIBROMYALGIA: ICD-10-CM

## 2023-09-20 DIAGNOSIS — B37.2 CANDIDAL INTERTRIGO: ICD-10-CM

## 2023-09-20 DIAGNOSIS — F11.20 CONTINUOUS OPIOID DEPENDENCE (H): ICD-10-CM

## 2023-09-20 DIAGNOSIS — J43.9 PULMONARY EMPHYSEMA, UNSPECIFIED EMPHYSEMA TYPE (H): ICD-10-CM

## 2023-09-20 DIAGNOSIS — Z01.818 PREOP EXAMINATION: ICD-10-CM

## 2023-09-20 DIAGNOSIS — E11.42 DIABETIC POLYNEUROPATHY ASSOCIATED WITH TYPE 2 DIABETES MELLITUS (H): ICD-10-CM

## 2023-09-20 DIAGNOSIS — E43 SEVERE PROTEIN-CALORIE MALNUTRITION (H): ICD-10-CM

## 2023-09-20 LAB
ALBUMIN SERPL BCG-MCNC: 4 G/DL (ref 3.5–5.2)
ALP SERPL-CCNC: 98 U/L (ref 35–104)
ALT SERPL W P-5'-P-CCNC: 10 U/L (ref 0–50)
AMPHETAMINES UR QL SCN: ABNORMAL
ANION GAP SERPL CALCULATED.3IONS-SCNC: 13 MMOL/L (ref 7–15)
AST SERPL W P-5'-P-CCNC: 24 U/L (ref 0–45)
BARBITURATES UR QL SCN: ABNORMAL
BENZODIAZ UR QL SCN: ABNORMAL
BILIRUB SERPL-MCNC: 0.6 MG/DL
BUN SERPL-MCNC: 18.1 MG/DL (ref 8–23)
BZE UR QL SCN: ABNORMAL
CALCIUM SERPL-MCNC: 9.6 MG/DL (ref 8.8–10.2)
CANNABINOIDS UR QL SCN: ABNORMAL
CHLORIDE SERPL-SCNC: 99 MMOL/L (ref 98–107)
CHOLEST SERPL-MCNC: 167 MG/DL
CREAT SERPL-MCNC: 0.73 MG/DL (ref 0.51–0.95)
DEPRECATED HCO3 PLAS-SCNC: 30 MMOL/L (ref 22–29)
DIGOXIN SERPL-MCNC: 1.5 NG/ML (ref 0.6–2)
EGFRCR SERPLBLD CKD-EPI 2021: 86 ML/MIN/1.73M2
ERYTHROCYTE [DISTWIDTH] IN BLOOD BY AUTOMATED COUNT: 13.8 % (ref 10–15)
FENTANYL UR QL: ABNORMAL
GLUCOSE SERPL-MCNC: 89 MG/DL (ref 70–99)
HBA1C MFR BLD: 6.1 % (ref 0–5.6)
HCT VFR BLD AUTO: 47 % (ref 35–47)
HDLC SERPL-MCNC: 75 MG/DL
HGB BLD-MCNC: 14.3 G/DL (ref 11.7–15.7)
LDLC SERPL CALC-MCNC: 75 MG/DL
MCH RBC QN AUTO: 28.9 PG (ref 26.5–33)
MCHC RBC AUTO-ENTMCNC: 30.4 G/DL (ref 31.5–36.5)
MCV RBC AUTO: 95 FL (ref 78–100)
NONHDLC SERPL-MCNC: 92 MG/DL
OPIATES UR QL SCN: ABNORMAL
PCP QUAL URINE (ROCHE): ABNORMAL
PLATELET # BLD AUTO: 197 10E3/UL (ref 150–450)
POTASSIUM SERPL-SCNC: 5.1 MMOL/L (ref 3.4–5.3)
PROT SERPL-MCNC: 7.6 G/DL (ref 6.4–8.3)
RBC # BLD AUTO: 4.94 10E6/UL (ref 3.8–5.2)
SODIUM SERPL-SCNC: 142 MMOL/L (ref 136–145)
TRIGL SERPL-MCNC: 83 MG/DL
WBC # BLD AUTO: 7.7 10E3/UL (ref 4–11)

## 2023-09-20 PROCEDURE — 80162 ASSAY OF DIGOXIN TOTAL: CPT | Performed by: FAMILY MEDICINE

## 2023-09-20 PROCEDURE — 36415 COLL VENOUS BLD VENIPUNCTURE: CPT | Performed by: FAMILY MEDICINE

## 2023-09-20 PROCEDURE — 99207 PR FOOT EXAM NO CHARGE: CPT | Performed by: FAMILY MEDICINE

## 2023-09-20 PROCEDURE — 80053 COMPREHEN METABOLIC PANEL: CPT | Performed by: FAMILY MEDICINE

## 2023-09-20 PROCEDURE — 80061 LIPID PANEL: CPT | Performed by: FAMILY MEDICINE

## 2023-09-20 PROCEDURE — 83036 HEMOGLOBIN GLYCOSYLATED A1C: CPT | Performed by: FAMILY MEDICINE

## 2023-09-20 PROCEDURE — 80307 DRUG TEST PRSMV CHEM ANLYZR: CPT | Performed by: FAMILY MEDICINE

## 2023-09-20 PROCEDURE — 99214 OFFICE O/P EST MOD 30 MIN: CPT | Performed by: FAMILY MEDICINE

## 2023-09-20 PROCEDURE — 85027 COMPLETE CBC AUTOMATED: CPT | Performed by: FAMILY MEDICINE

## 2023-09-20 RX ORDER — NYSTATIN 100000 [USP'U]/G
POWDER TOPICAL
Qty: 60 G | Refills: 3 | Status: SHIPPED | OUTPATIENT
Start: 2023-09-20 | End: 2024-03-04

## 2023-09-20 RX ORDER — OXYCODONE HYDROCHLORIDE 10 MG/1
10 TABLET ORAL EVERY 6 HOURS PRN
Qty: 120 TABLET | Refills: 0 | Status: SHIPPED | OUTPATIENT
Start: 2023-09-20 | End: 2023-10-23

## 2023-09-20 ASSESSMENT — ANXIETY QUESTIONNAIRES
1. FEELING NERVOUS, ANXIOUS, OR ON EDGE: NOT AT ALL
7. FEELING AFRAID AS IF SOMETHING AWFUL MIGHT HAPPEN: NOT AT ALL
GAD7 TOTAL SCORE: 2
GAD7 TOTAL SCORE: 2
IF YOU CHECKED OFF ANY PROBLEMS ON THIS QUESTIONNAIRE, HOW DIFFICULT HAVE THESE PROBLEMS MADE IT FOR YOU TO DO YOUR WORK, TAKE CARE OF THINGS AT HOME, OR GET ALONG WITH OTHER PEOPLE: NOT DIFFICULT AT ALL
2. NOT BEING ABLE TO STOP OR CONTROL WORRYING: SEVERAL DAYS
6. BECOMING EASILY ANNOYED OR IRRITABLE: NOT AT ALL
3. WORRYING TOO MUCH ABOUT DIFFERENT THINGS: SEVERAL DAYS
4. TROUBLE RELAXING: NOT AT ALL
5. BEING SO RESTLESS THAT IT IS HARD TO SIT STILL: NOT AT ALL

## 2023-09-20 NOTE — PROGRESS NOTES
[facility] [address] [place] [address]  Phone: [phone]  Fax: [phone]  Primary Provider: Cammy Bui  Pre-op Performing Provider: CAMMY BUI      PREOPERATIVE EVALUATION:  Today's date: 9/20/2023    Mary Kay is a 73 year old female who presents for a preoperative evaluation.      9/20/2023    10:37 AM   Additional Questions   Roomed by Mimi       Surgical Information:  Surgery/Procedure: Angiogram  Surgery Location: [facility]  Surgeon: Dr. Valencia  Surgery Date: 10/10/23  Time of Surgery: Unknown  Where patient plans to recover: At home with family  Fax number for surgical facility: Note does not need to be faxed, will be available electronically in Epic.    Assessment & Plan     The proposed surgical procedure is considered INTERMEDIATE risk.    Problem List Items Addressed This Visit          Nervous and Auditory    Chronic pain syndrome    Relevant Medications    oxyCODONE IR (ROXICODONE) 10 MG tablet    Other Relevant Orders    Urine Drugs of Abuse Screen No (Completed)    Fibromyalgia    Relevant Medications    oxyCODONE IR (ROXICODONE) 10 MG tablet    Diabetic polyneuropathy associated with type 2 diabetes mellitus (H)    Relevant Orders    FOOT EXAM (Completed)    Lipid panel reflex to direct LDL Non-fasting (Completed)    Hemoglobin A1c (Completed)    Comprehensive metabolic panel (BMP + Alb, Alk Phos, ALT, AST, Total. Bili, TP) (Completed)       Respiratory    Pulmonary emphysema, unspecified emphysema type (H)       Digestive    Peptic Ulcer    Relevant Orders    CBC with platelets (Completed)    Protein-calorie malnutrition (H)    Relevant Orders    Miscellaneous Order for DME - ONLY FOR DME    Comprehensive metabolic panel (BMP + Alb, Alk Phos, ALT, AST, Total. Bili, TP) (Completed)       Circulatory    Atrial fibrillation, unspecified type (H) - Primary    Relevant Orders    Digoxin level (Completed)        Musculoskeletal and Integumentary    Candidal intertrigo    Relevant Medications    nystatin (NYSTOP) 024640 UNIT/GM external powder       Other    F11.2 - Continuous opioid dependence (H)     Other Visit Diagnoses       Preop examination        Relevant Orders    CBC with platelets (Completed)              Possible Sleep Apnea: patient snores - no sleep study - uses nocturnal oxygen           Risks and Recommendations:  The patient has the following additional risks and recommendations for perioperative complications:   - Consult Hospitalist / IM to assist with post-op medical management   - patient is frail, has oxygen requirements (uses it for sleep at home); poor tolerance of illness/physical challenges     Antiplatelet or Anticoagulation Medication Instructions:   - apixaban (Eliquis), edoxaban (Savaysa), rivaroxaban (Xarelto): Bleeding risk is moderate or high for this procedure AND CrCl  (>=) 50 mL/min. HOLD 2 days before surgery.     Additional Medication Instructions:  Patient should continue her chronic pain medications ; should continue inhalers; hold Atorvastatin night before; take cardiac meds with sip of water morning of surgery     RECOMMENDATION:  APPROVAL GIVEN to proceed with proposed procedure, without further diagnostic evaluation.      Subjective       HPI related to upcoming procedure: patient has increasing murmur, echo evidence of tight aortic valve - scheduled for aortic valve replacement         9/20/2023    10:35 AM   Preop Questions   1. Have you ever had a heart attack or stroke? No   2. Have you ever had surgery on your heart or blood vessels, such as a stent placement, a coronary artery bypass, or surgery on an artery in your head, neck, heart, or legs? No   3. Do you have chest pain with activity? No   4. Do you have a history of  heart failure? No   5. Do you currently have a cold, bronchitis or symptoms of other infection? No   6. Do you have a cough, shortness of breath, or  "wheezing? No   7. Do you or anyone in your family have previous history of blood clots? No   8. Do you or does anyone in your family have a serious bleeding problem such as prolonged bleeding following surgeries or cuts? No   9. Have you ever had problems with anemia or been told to take iron pills? No   10. Have you had any abnormal blood loss such as black, tarry or bloody stools, or abnormal vaginal bleeding? No   11. Have you ever had a blood transfusion? YES - due to gastric ulcer   11a. Have you ever had a transfusion reaction? No   12. Are you willing to have a blood transfusion if it is medically needed before, during, or after your surgery? Yes   13. Have you or any of your relatives ever had problems with anesthesia? No   14. Do you have sleep apnea, excessive snoring or daytime drowsiness? YES - snores , uses nocturnal oxygen    14a. Do you have a CPAP machine? No   15. Do you have any artifical heart valves or other implanted medical devices like a pacemaker, defibrillator, or continuous glucose monitor? No   16. Do you have artificial joints? YES - left knee   17. Are you allergic to latex? YES: tapes/adhesives/latex - rash       Health Care Directive:  Patient does not have a Health Care Directive or Living Will: \"I don't want to be a vegetable\" - no written directives - son, Mukul Tejada, is her decision maker     Preoperative Review of :   reviewed - controlled substances reflected in medication list.  Takes Oxycodone regularly for pain (unable to tolerate NSAIDs due to severe gastric bleeding).          Review of Systems   Constitutional:  Positive for activity change (less active), fatigue and unexpected weight change (losing weight - malnutrition). Negative for chills and fever.   HENT: Negative.  Negative for congestion and sore throat.    Eyes:  Negative for visual disturbance.   Respiratory:  Positive for shortness of breath (chronic). Negative for cough.    Cardiovascular:  Negative for " chest pain, palpitations and peripheral edema.   Gastrointestinal:  Negative for abdominal pain.        History of ulcer disease with bleeding -   Endocrine: Negative for polydipsia and polyuria.   Genitourinary: Negative.    Musculoskeletal:  Positive for arthralgias and back pain.   Skin: Negative.    Allergic/Immunologic: Negative.    Neurological:  Positive for dizziness and weakness (generalized weakness).   Hematological:  Bruises/bleeds easily (on anticoagulation therapy).   Psychiatric/Behavioral:  Positive for mood changes and sleep disturbance. The patient is nervous/anxious.        Complains of poor balance   Less activity than previous   Uses Ensure regularly       Patient Active Problem List    Diagnosis Date Noted    Protein-calorie malnutrition (H) 08/16/2023     Priority: Medium    F11.2 - Continuous opioid dependence (H) 08/16/2023     Priority: Medium    Pulmonary emphysema, unspecified emphysema type (H) 07/11/2023     Priority: Medium    Acute on chronic diastolic heart failure (H) 06/05/2023     Priority: Medium    Community acquired pneumonia 06/05/2023     Priority: Medium    Hypotension, unspecified hypotension type 06/04/2023     Priority: Medium    Pleural effusion 06/04/2023     Priority: Medium    SOB (shortness of breath) 05/24/2023     Priority: Medium    Synovial cyst of knee, unspecified laterality 05/24/2023     Priority: Medium    Atrial flutter, unspecified type (H) 05/24/2023     Priority: Medium    Hypoxia 01/10/2023     Priority: Medium    Acute and chronic respiratory failure with hypercapnia (H) 03/27/2022     Priority: Medium    Nicotine dependence 03/26/2022     Priority: Medium     Formatting of this note might be different from the original.  Created by [facility] Annotation: Nov 13 2007  7:54AM - Cat Meehan: average 1 ppd      COPD exacerbation (H) 03/26/2022     Priority: Medium    Acute on chronic respiratory failure with hypoxia (H) 03/26/2022      Priority: Medium    Pneumonia of right lower lobe due to infectious organism 03/26/2022     Priority: Medium    Dyspnea, unspecified type 03/26/2022     Priority: Medium    Atrial fibrillation, unspecified type (H) 09/27/2021     Priority: Medium    Chronic gastric ulcer without hemorrhage and without perforation 10/19/2020     Priority: Medium    Advanced directives, counseling/discussion 08/17/2020     Priority: Medium     Son, Mukul, is her decision maker; thinks she wrote a document years ago   (8/2020)        Primary osteoarthritis of both knees 01/17/2020     Priority: Medium    Mixed stress and urge urinary incontinence 08/16/2019     Priority: Medium    Skin lesion 07/17/2019     Priority: Medium     Left lower extemity - medial shin/ankle - small 1 mm with central   ulceration        Dyslipidemia      Priority: Medium    Upper GI bleed 12/13/2018     Priority: Medium    Melena 12/13/2018     Priority: Medium    Anemia due to blood loss, acute 07/18/2018     Priority: Medium    Diabetic polyneuropathy associated with type 2 diabetes mellitus (H) 07/18/2018     Priority: Medium    Chronic anemia 06/27/2018     Priority: Medium    Cataract 11/06/2017     Priority: Medium    Bunion, left 07/19/2017     Priority: Medium    Callus of foot 07/19/2017     Priority: Medium    Nonrheumatic aortic valve stenosis 05/23/2017     Priority: Medium    Gastroenteritis 12/24/2016     Priority: Medium    Atrial flutter (H)      Priority: Medium     Created by Conversion        Syncope 12/22/2016     Priority: Medium    Opacity of lung on imaging study 12/22/2016     Priority: Medium    Acute gastritis 12/22/2016     Priority: Medium    Type 2 diabetes mellitus with hypoglycemia (H)      Priority: Medium    Fibromyalgia      Priority: Medium     Created by Conversion  [facility] Annotation: Nov 13 2007  8:06Yusuf Jean Baptistee:   11/07 sees Dr. Mascorro;        Mixed hyperlipidemia      Priority: Medium      Created by Conversion        Osteoarthritis of both knees 08/22/2016     Priority: Medium    Osteoarthritis Of The Shoulder      Priority: Medium     Created by Conversion  Replacement Utility updated for latest IMO load        Chronic Constipation      Priority: Medium     Created by Conversion  Replacement Utility updated for latest IMO load        Chronic Major Depression      Priority: Medium     Created by Conversion  Replacement Utility updated for latest IMO load        Dry Eye Syndrome      Priority: Medium     Created by Conversion  Replacement Utility updated for latest IMO load        Abdominal Pain      Priority: Medium     Created by Conversion  Replacement Utility updated for latest IMO load        Peptic Ulcer      Priority: Medium     Created by Conversion  [facility] Annotation: Nov 13 2007  8:09AM - Cammy Bui:   due   to NSAIDS  Replacement Utility updated for latest IMO load        Osteoarthritis, hip, bilateral 06/20/2016     Priority: Medium    Primary osteoarthritis of left knee 06/20/2016     Priority: Medium    Candidal intertrigo 05/11/2016     Priority: Medium    Aspiration pneumonia (H) 03/01/2016     Priority: Medium    Nicotine Dependence      Priority: Medium     Created by Conversion  [facility] Annotation: Nov 13 2007  7:54AM - Cat Meehan: average 1   ppd        Essential hypertension      Priority: Medium     Created by Conversion        Chronic pain syndrome      Priority: Medium     Cervical disc disease, rheumatoid arthritis (unable to take NSAIDs)  Controlled substance agreement signed/in chart 11/23/15        Controlled substance agreement signed 11/23/2015     Priority: Medium     Cervical disc disease, rheumatoid arthritis (unable to take NSAIDs)  Controlled substance agreement signed/in chart 11/23/15        Cervical neck pain with evidence of disc disease 07/22/2015     Priority: Medium    Headache 07/17/2015     Priority: Medium    Hematoma of  "abdominal wall 07/05/2015     Priority: Medium    Skin lesion of face 05/22/2015     Priority: Medium    Tendonitis of wrist, right 04/22/2015     Priority: Medium    Menopause 04/06/2015     Priority: Medium    Flashers or floaters of right eye 02/23/2015     Priority: Medium    Tendonitis of wrist, left 01/21/2015     Priority: Medium    Trigger point of thoracic region 01/21/2015     Priority: Medium    Generalized osteoarthrosis, involving multiple sites 01/14/2015     Priority: Medium    Vertigo 12/17/2014     Priority: Medium    Rotator cuff tendonitis 12/17/2014     Priority: Medium     Left sided        Paroxysmal Atrial Fibrillation      Priority: Medium     Created by Conversion        Chronic Reflux Esophagitis      Priority: Medium     Created by Conversion        Benign Adenomatous Polyp Of The Large Intestine      Priority: Medium     Created by Conversion  [facility] Annotation: Aug  1 2008  8:10PM Cammy Shabazz:   followup colonoscopy 2006        Abnormal Weight Loss      Priority: Medium     Created by Conversion        Onychomycosis Of The Toenails      Priority: Medium     Created by Conversion        Diarrhea      Priority: Medium     Created by Conversion        Ganglion Of The Left Wrist      Priority: Medium     Created by Conversion        Larynx Edema      Priority: Medium     Created by Conversion  [facility] Annotation: Jan 8 2008  9:19AM - Cammy Bui:   \"Geronimo's\" edema secondary to smoking;        Obesity      Priority: Medium     Created by Conversion        Medial Epicondylitis      Priority: Medium     Created by Conversion  [facility] Annotation: Feb 13 2012  9:11AM - Cammy Bui:   right        Skin Lesion      Priority: Medium     Created by Conversion        Urinary Incontinence      Priority: Medium     Created by Conversion        Hypokalemia      Priority: Medium     Created by Conversion        Muscle Cramps In The Calf      " Priority: Medium     Created by Conversion        Edema      Priority: Medium     Created by Conversion        Lump In / On The Skin      Priority: Medium     Created by Conversion        Nausea      Priority: Medium     Created by Conversion          Past Medical History:   Diagnosis Date    Anemia     Aortic stenosis     Atrial fibrillation (H)     Atrial flutter (H)     Benign neoplasm of adenomatous polyp     large intestine     Chronic constipation     Chronic pain syndrome     COPD (chronic obstructive pulmonary disease) (H)     Oxygen at night     Dependence on supplemental oxygen     Oxygen at noc, during the day as needed    Depression     Diabetes mellitus (H)     Dry eye syndrome     Fibromyalgia     Ganglion     left wrist    GERD (gastroesophageal reflux disease)     Hyperlipidemia     Hypertension     Hypokalemia     Infective otitis externa, unspecified     Created by Conversion     Larynx edema     Lung disease     Malignant neoplasm of vulva (H)     Created by Conversion [facility] Annotation: Apr 17 2007  8:24AM - Cammy Bui:  resection per Dr. Alfonso Mane 9/06;  Replacement Utility updated for latest IMO load    Medial epicondylitis     Onychomycosis     Osteoarthritis     Peptic ulcer     Polyneuropathy     Vulvar malignant neoplasm (H)      Past Surgical History:   Procedure Laterality Date    BIOPSY BREAST Right     BIOPSY BREAST Right 01/28/2015    BIOPSY BREAST Right 1/28/2015    Procedure: RIGHT BREAST BIOPSY AFTER WIRE LOCALIZATION AT 0940;  Surgeon: Renée Soriano MD;  [place] [facility] [place]  Service:     BIOPSY OF BREAST, INCISIONAL      Description: Incisional Breast Biopsy;  Recorded: 11/13/2007;  Comments: benign    COLONOSCOPY N/A 6/14/2019    Procedure: COLONOSCOPY;  Surgeon: Eduardo Mora MD;  [place] [facility] [place]  Service: Gastroenterology    ESOPHAGOSCOPY, GASTROSCOPY, DUODENOSCOPY (EGD), COMBINED N/A 11/6/2018    Procedure:  "ESOPHAGOGASTRODUODENOSCOPY;  Surgeon: Lit Fernando MD;  Location: [facility] OR;  Service:     HYSTERECTOMY      JOINT REPLACEMENT Left     TKA    PICC TRIPLE LUMEN PLACEMENT  1/12/2023         ND ABLATE HEART DYSRHYTHM FOCUS      Description: Catheter Ablation Atrial Fibrillation;  Recorded: 07/31/2012;  Comments: 7/24/12 PVI with Dr. Gardiner and nilay to all 5 pulm veins and CTI fl ablation line as well.    ZZC SUPRACERV ABD HYSTERECTOMY      Description: Supracervical Hysterectomy;  Proc Date: 01/01/1985;  Comments: some cervix left!; ovaries intact; done for bleeding     Current Outpatient Medications   Medication Sig Dispense Refill    ACCU-CHEK SOFTCLIX LANCETS lancets [ACCU-CHEK SOFTCLIX LANCETS LANCETS] TEST THREE TIMES DAILY 300 each 3    albuterol (PROAIR HFA/PROVENTIL HFA/VENTOLIN HFA) 108 (90 Base) MCG/ACT inhaler Inhale 2 puffs into the lungs every 4 hours as needed for wheezing 54 g 1    apixaban ANTICOAGULANT (ELIQUIS) 5 MG tablet Take 1 tablet (5 mg) by mouth 2 times daily 180 tablet 2    atorvastatin (LIPITOR) 20 MG tablet TAKE 1 TABLET(20 MG) BY MOUTH AT BEDTIME 90 tablet 3    BD ULTRA-FINE SHORT PEN NEEDLE 31 gauge x 5/16\" Ndle [BD ULTRA-FINE SHORT PEN NEEDLE 31 GAUGE X 5/16\" NDLE] TEST FOUR TIMES DAILY WITH MEALS AND AT BEDTIME 400 each 3    blood-glucose meter (ONETOUCH VERIO IQ METER) Misc [BLOOD-GLUCOSE METER (ONETOUCH VERIO IQ METER) MISC] Check blood sugar three times a day. 1 each 0    budesonide-formoterol (SYMBICORT) 160-4.5 MCG/ACT Inhaler Inhale 2 puffs into the lungs 2 times daily 10.2 g 4    diaper,brief,adult,disposable (ADULT BRIEFS - LARGE) Misc [DIAPER,BRIEF,ADULT,DISPOSABLE (ADULT BRIEFS - LARGE) MISC] Use 3-4 daily as needed for incontinence 120 each 6    digoxin (LANOXIN) 125 MCG tablet TAKE 1 TABLET(125 MCG) BY MOUTH DAILY 90 tablet 2    diltiazem ER COATED BEADS (CARDIZEM CD/CARTIA XT) 120 MG 24 hr capsule TAKE 1 CAPSULE(120 MG) BY MOUTH DAILY 90 capsule 2    " furosemide (LASIX) 20 MG tablet TAKE 1 TABLET(20 MG) BY MOUTH TWICE DAILY 180 tablet 3    gabapentin (NEURONTIN) 600 MG tablet Take 1 tablet (600 mg) by mouth 3 times daily 270 tablet 2    generic lancets (FINGERSTIX LANCETS) [GENERIC LANCETS (FINGERSTIX LANCETS)] Dispense brand per patient's insurance at pharmacy discretion. 300 each 0    ipratropium - albuterol 0.5 mg/2.5 mg/3 mL (DUONEB) 0.5-2.5 (3) MG/3ML neb solution Take 1 vial (3 mLs) by nebulization every 6 hours as needed for shortness of breath or wheezing 90 mL 0    JARDIANCE 10 MG TABS tablet TAKE 1 TABLET(10 MG) BY MOUTH DAILY 90 tablet 2    magnesium oxide (MAG-OX) 400 MG tablet Take 1 tablet (400 mg) by mouth daily 30 tablet 0    meclizine (ANTIVERT) 25 MG tablet TAKE 1 TABLET(25 MG) BY MOUTH THREE TIMES DAILY AS NEEDED FOR DIZZINESS OR NAUSEA 45 tablet 5    metoprolol tartrate (LOPRESSOR) 25 MG tablet TAKE 1 TABLET(25 MG) BY MOUTH TWICE DAILY 180 tablet 2    naloxone (NARCAN) 4 MG/0.1ML nasal spray Spray 1 spray (4 mg) into one nostril alternating nostrils once as needed for opioid reversal every 2-3 minutes until assistance arrives 0.2 mL 0    nicotine (NICOTROL) 10 MG inhaler Use 1 cartridge as needed for urge to smoke by puffing over course of 20min.  Use 6-16 cart/day; reduce number of cart/day over 6-12 weeks. 168 each 1    nystatin (NYSTOP) 222764 UNIT/GM external powder APPLY TOPICALLY TO THE AFFECTED AREA 2-3 TIMES DAILY AS NEEDED 60 g 3    omeprazole (PRILOSEC) 20 MG DR capsule Take 20 mg by mouth daily      ONETOUCH VERIO IQ test strip USE TO TEST THREE TIMES DAILY 300 strip 1    oxyCODONE IR (ROXICODONE) 10 MG tablet Take 1 tablet (10 mg) by mouth every 6 hours as needed for moderate to severe pain 120 tablet 0    potassium chloride ER (KLOR-CON M) 20 MEQ CR tablet TAKE 1 TABLET(20 MEQ) BY MOUTH DAILY 90 tablet 3    sucralfate (CARAFATE) 1 GM tablet TAKE 1 TABLET BY MOUTH FOUR TIMES DAILY. CRUSH TABLET AND MIX IT WITH A LITTLE WATER THEN  "SWALLOW 360 tablet 3    triamcinolone (KENALOG) 0.1 % external cream Apply topically 2 times daily Apply topically sparingly BID to lower extremity rash 45 g 1       Allergies   Allergen Reactions    Celebrex [Celecoxib] Rash     patient had butterfly rash - \"lupus-like\"      Latex Rash        Social History     Tobacco Use    Smoking status: Every Day     Packs/day: 0.25     Types: Cigarettes    Smokeless tobacco: Never    Tobacco comments:     seen by TTS inpatient on 3/31/22   Substance Use Topics    Alcohol use: Yes     Comment: Alcoholic Drinks/day: very little     Family History   Problem Relation Age of Onset    Heart Failure Mother     Cancer Other         paternal HX-laryngeal     Alcoholism Sister     No Known Problems Daughter     No Known Problems Maternal Grandmother     No Known Problems Maternal Grandfather     No Known Problems Paternal Grandmother     No Known Problems Paternal Grandfather     No Known Problems Maternal Aunt     No Known Problems Paternal Aunt     Alcoholism Sister     Alcoholism Brother     Alcoholism Father     Cancer Paternal Uncle         Gastric-Alcohol    Cancer Paternal Uncle         gastric-Alcohol    Hereditary Breast and Ovarian Cancer Syndrome No family hx of     Breast Cancer No family hx of     Colon Cancer No family hx of     Endometrial Cancer No family hx of     Ovarian Cancer No family hx of      History   Drug Use No         Objective     /58 (BP Location: Left arm, Patient Position: Sitting, Cuff Size: Adult Small)   Pulse 56   Temp 97.2  F (36.2  C) (Temporal)   Resp 20   Ht 1.67 m (5' 5.75\")   Wt 54.9 kg (121 lb)   SpO2 93%   BMI 19.68 kg/m      Physical Exam  Vitals reviewed.   Constitutional:       General: She is not in acute distress.     Appearance: She is ill-appearing (chronically ill appearing).   HENT:      Head: Normocephalic.      Right Ear: Tympanic membrane, ear canal and external ear normal.      Left Ear: Tympanic membrane, ear canal " and external ear normal.      Nose: Nose normal.      Mouth/Throat:      Mouth: Mucous membranes are moist.      Pharynx: No posterior oropharyngeal erythema.   Eyes:      Extraocular Movements: Extraocular movements intact.      Conjunctiva/sclera: Conjunctivae normal.      Pupils: Pupils are equal, round, and reactive to light.   Cardiovascular:      Rate and Rhythm: Normal rate and regular rhythm.      Pulses: Normal pulses.      Heart sounds: Normal heart sounds. No murmur heard.  Pulmonary:      Effort: Pulmonary effort is normal.      Breath sounds: Normal breath sounds.   Abdominal:      Palpations: Abdomen is soft. There is no mass.      Tenderness: There is no abdominal tenderness. There is no guarding or rebound.   Musculoskeletal:         General: No deformity. Normal range of motion.      Cervical back: Normal range of motion and neck supple.   Lymphadenopathy:      Cervical: No cervical adenopathy.   Skin:     General: Skin is warm and dry.   Neurological:      General: No focal deficit present.      Mental Status: She is alert.      Gait: Gait abnormal (poor exercise tolerance).   Psychiatric:         Mood and Affect: Mood normal.         Recent Labs   Lab Test 06/30/23  1352 06/04/23  0448 06/03/23  0615 05/25/23  1806 05/25/23  0507 02/09/23  0441 02/08/23  0759 01/19/23  0438 01/18/23  0522 08/24/22  0951 07/25/22  1026   HGB 12.9  --  8.3*   < > 7.3*   < > 9.2*  --   --    < > 9.0*     --  213   < > 357   < > 432  --   --    < > 237   INR  --   --   --   --   --   --  1.34*  --  1.35*   < > 3.7*     --  140   < > 136   < > 142   < > 139   < > 144   POTASSIUM 4.6 4.2 3.7   < > 4.0   < > 4.6   < > 3.6   < > 4.0   CR 0.71  --  0.77   < > 0.86   < > 0.70   < > 0.67   < > 0.51   A1C  --   --   --   --  6.1*  --   --   --   --   --  5.8*    < > = values in this interval not displayed.        Diagnostics:  Recent Results (from the past 168 hour(s))   Digoxin level    Collection Time: 09/20/23  11:36 AM   Result Value Ref Range    Digoxin 1.5 0.6 - 2.0 ng/mL   Lipid panel reflex to direct LDL Non-fasting    Collection Time: 09/20/23 11:36 AM   Result Value Ref Range    Cholesterol 167 <200 mg/dL    Triglycerides 83 <150 mg/dL    Direct Measure HDL 75 >=50 mg/dL    LDL Cholesterol Calculated 75 <=100 mg/dL    Non HDL Cholesterol 92 <130 mg/dL   Hemoglobin A1c    Collection Time: 09/20/23 11:36 AM   Result Value Ref Range    Hemoglobin A1C 6.1 (H) 0.0 - 5.6 %   Comprehensive metabolic panel (BMP + Alb, Alk Phos, ALT, AST, Total. Bili, TP)    Collection Time: 09/20/23 11:36 AM   Result Value Ref Range    Sodium 142 136 - 145 mmol/L    Potassium 5.1 3.4 - 5.3 mmol/L    Chloride 99 98 - 107 mmol/L    Carbon Dioxide (CO2) 30 (H) 22 - 29 mmol/L    Anion Gap 13 7 - 15 mmol/L    Urea Nitrogen 18.1 8.0 - 23.0 mg/dL    Creatinine 0.73 0.51 - 0.95 mg/dL    Calcium 9.6 8.8 - 10.2 mg/dL    Glucose 89 70 - 99 mg/dL    Alkaline Phosphatase 98 35 - 104 U/L    AST 24 0 - 45 U/L    ALT 10 0 - 50 U/L    Protein Total 7.6 6.4 - 8.3 g/dL    Albumin 4.0 3.5 - 5.2 g/dL    Bilirubin Total 0.6 <=1.2 mg/dL    GFR Estimate 86 >60 mL/min/1.73m2   CBC with platelets    Collection Time: 09/20/23 11:36 AM   Result Value Ref Range    WBC Count 7.7 4.0 - 11.0 10e3/uL    RBC Count 4.94 3.80 - 5.20 10e6/uL    Hemoglobin 14.3 11.7 - 15.7 g/dL    Hematocrit 47.0 35.0 - 47.0 %    MCV 95 78 - 100 fL    MCH 28.9 26.5 - 33.0 pg    MCHC 30.4 (L) 31.5 - 36.5 g/dL    RDW 13.8 10.0 - 15.0 %    Platelet Count 197 150 - 450 10e3/uL   Drug Abuse Screen Qual Urine    Collection Time: 09/20/23 11:52 AM   Result Value Ref Range    Amphetamines Urine Screen Negative Screen Negative    Barbituates Urine Screen Negative Screen Negative    Benzodiazepine Urine Screen Negative Screen Negative    Cannabinoids Urine Screen Positive (A) Screen Negative    Cocaine Urine Screen Negative Screen Negative    Fentanyl Qual Urine Screen Negative Screen Negative    Opiates  Urine Screen Negative Screen Negative    PCP Urine Screen Negative Screen Negative      Previous EKG available in record     Revised Cardiac Risk Index (RCRI):  The patient has the following serious cardiovascular risks for perioperative complications:   - High risk surgery (>5% cardiac complication risk) = 1 point     RCRI Interpretation: 1 point: Class II (low risk - 0.9% complication rate)         Signed Electronically by: SAVANA SILVER MD  Copy of this evaluation report is provided to requesting physician.        Prior to immunization administration, verified patients identity using patient s name and date of birth. Please see Immunization Activity for additional information.     Screening Questionnaire for Adult Immunization    Are you sick today?   No   Do you have allergies to medications, food, a vaccine component or latex?   Yes   Have you ever had a serious reaction after receiving a vaccination?   No   Do you have a long-term health problem with heart, lung, kidney, or metabolic disease (e.g., diabetes), asthma, a blood disorder, no spleen, complement component deficiency, a cochlear implant, or a spinal fluid leak?  Are you on long-term aspirin therapy?   Yes   Do you have cancer, leukemia, HIV/AIDS, or any other immune system problem?   No   Do you have a parent, brother, or sister with an immune system problem?   No   In the past 3 months, have you taken medications that affect  your immune system, such as prednisone, other steroids, or anticancer drugs; drugs for the treatment of rheumatoid arthritis, Crohn s disease, or psoriasis; or have you had radiation treatments?   No   Have you had a seizure, or a brain or other nervous system problem?   No   During the past year, have you received a transfusion of blood or blood    products, or been given immune (gamma) globulin or antiviral drug?   Yes   For women: Are you pregnant or is there a chance you could become       pregnant during  the next month?   No   Have you received any vaccinations in the past 4 weeks?   No     Immunization questionnaire was positive for at least one answer.  Notified Dr. Brizuela.      Patient instructed to remain in clinic for 15 minutes afterwards, and to report any adverse reactions.     Screening performed by Mimi Amador CMA on 9/20/2023 at 10:43 AM.      .undefined[^^Answers submitted by the patient for this visit:  JERMAIN-7 (Submitted on 9/20/2023)  JERMAIN 7 TOTAL SCORE: 2

## 2023-09-21 DIAGNOSIS — E43 SEVERE PROTEIN-CALORIE MALNUTRITION (H): Primary | ICD-10-CM

## 2023-09-21 NOTE — TELEPHONE ENCOUNTER
General Call      Reason for Call:  Prescription    What are your questions or concerns:  pt called in, she had an appt with pcp yesterday and discussed getting a prescription for ensure drinks. She stated that she contacted her pharmacy and was told that they will cover it. Please look into this request and send to pharmacy if applicable.  Thanks!  [employer] #[id number] [facility] [address] AT [facility] [address]   Routing to [facility]    Date of last appointment with provider: 9/20/23    Could [facility] or would you prefer to receive a phone call?:   Patient would prefer a phone call   Okay to leave a detailed message?: Yes at Home number on file [phone] (home)

## 2023-09-24 ASSESSMENT — ENCOUNTER SYMPTOMS
ARTHRALGIAS: 1
ABDOMINAL PAIN: 0
CHILLS: 0
COUGH: 0
UNEXPECTED WEIGHT CHANGE: 1
BACK PAIN: 1
FEVER: 0
POLYDIPSIA: 0
ACTIVITY CHANGE: 1
DIZZINESS: 1
BRUISES/BLEEDS EASILY: 1
NERVOUS/ANXIOUS: 1
FATIGUE: 1
WEAKNESS: 1
SHORTNESS OF BREATH: 1
SLEEP DISTURBANCE: 1
PALPITATIONS: 0
SORE THROAT: 0
ALLERGIC/IMMUNOLOGIC NEGATIVE: 1

## 2023-09-27 NOTE — TELEPHONE ENCOUNTER
Pt called she stated that she contacted her pharmacy and was told they did not receive the prescription. Please resend at your earliest convenient.  Thanks!

## 2023-09-29 NOTE — TELEPHONE ENCOUNTER
Pt called back and stated that her pharmacy still has not received prescription. Pt states she has been waiting for a while to get the ensure. Please send again at your earliest convenient. [facility] [address]   [facility] [address]. Phone # [phone] , fax # [phone]   Thanks!

## 2023-10-03 ENCOUNTER — TELEPHONE (OUTPATIENT)
Dept: FAMILY MEDICINE | Facility: CLINIC | Age: 73
End: 2023-10-03
Payer: COMMERCIAL

## 2023-10-03 NOTE — TELEPHONE ENCOUNTER
Please let her know that apparently her insurance doesn't cover Ensure (or other types of supplements like this) if she doesn't have a feeding tube!

## 2023-10-03 NOTE — TELEPHONE ENCOUNTER
Medication: Nutritional Supplements (ENSURE COMPLETE) LIQD     This medication/ ensure is excluded from the pt's pharmacy benefit. No option for a pa.      Please close encounter when finished.    Thank you,  Central Prior Authorization Team  [phone]

## 2023-10-03 NOTE — TELEPHONE ENCOUNTER
Ensure not covered, prior authorization not available. Nutritional supplements only covered by patient's insurance (Medicare) if:    Enteral nutrition is covered for a beneficiary who requires feedings via an enteral access device to provide sufficient nutrients to maintain weight and strength commensurate with the beneficiary's overall health status and has a permanent:    full or partial non-function or disease of the structures that normally permit food to reach the small bowel; OR,  2. disease that impairs digestion and/or absorption of an oral diet, directly or indirectly, by the small bowel.

## 2023-10-17 DIAGNOSIS — E11.649 TYPE 2 DIABETES MELLITUS WITH HYPOGLYCEMIA WITHOUT COMA, WITHOUT LONG-TERM CURRENT USE OF INSULIN (H): ICD-10-CM

## 2023-10-17 RX ORDER — BLOOD SUGAR DIAGNOSTIC
STRIP MISCELLANEOUS
Qty: 300 STRIP | Refills: 0 | Status: SHIPPED | OUTPATIENT
Start: 2023-10-17 | End: 2024-01-15

## 2023-10-23 ENCOUNTER — OFFICE VISIT (OUTPATIENT)
Dept: FAMILY MEDICINE | Facility: CLINIC | Age: 73
End: 2023-10-23
Payer: COMMERCIAL

## 2023-10-23 VITALS
HEART RATE: 63 BPM | DIASTOLIC BLOOD PRESSURE: 63 MMHG | SYSTOLIC BLOOD PRESSURE: 103 MMHG | OXYGEN SATURATION: 93 % | TEMPERATURE: 97.5 F | RESPIRATION RATE: 18 BRPM

## 2023-10-23 DIAGNOSIS — Z23 NEED FOR PROPHYLACTIC VACCINATION AGAINST HEPATITIS B VIRUS: ICD-10-CM

## 2023-10-23 DIAGNOSIS — I35.0 NONRHEUMATIC AORTIC VALVE STENOSIS: ICD-10-CM

## 2023-10-23 DIAGNOSIS — J43.9 PULMONARY EMPHYSEMA, UNSPECIFIED EMPHYSEMA TYPE (H): ICD-10-CM

## 2023-10-23 DIAGNOSIS — G89.4 CHRONIC PAIN SYNDROME: ICD-10-CM

## 2023-10-23 DIAGNOSIS — M79.7 FIBROMYALGIA: Primary | ICD-10-CM

## 2023-10-23 DIAGNOSIS — F11.20 CONTINUOUS OPIOID DEPENDENCE (H): ICD-10-CM

## 2023-10-23 DIAGNOSIS — E11.42 DIABETIC POLYNEUROPATHY ASSOCIATED WITH TYPE 2 DIABETES MELLITUS (H): ICD-10-CM

## 2023-10-23 DIAGNOSIS — Z29.11 NEED FOR VACCINATION AGAINST RESPIRATORY SYNCYTIAL VIRUS: ICD-10-CM

## 2023-10-23 DIAGNOSIS — Z23 NEED FOR IMMUNIZATION AGAINST INFLUENZA: ICD-10-CM

## 2023-10-23 PROCEDURE — G0008 ADMIN INFLUENZA VIRUS VAC: HCPCS | Performed by: FAMILY MEDICINE

## 2023-10-23 PROCEDURE — 90662 IIV NO PRSV INCREASED AG IM: CPT | Performed by: FAMILY MEDICINE

## 2023-10-23 PROCEDURE — 20553 NJX 1/MLT TRIGGER POINTS 3/>: CPT | Performed by: FAMILY MEDICINE

## 2023-10-23 PROCEDURE — 99214 OFFICE O/P EST MOD 30 MIN: CPT | Mod: 25 | Performed by: FAMILY MEDICINE

## 2023-10-23 RX ORDER — OXYCODONE HYDROCHLORIDE 10 MG/1
10 TABLET ORAL EVERY 6 HOURS PRN
Qty: 120 TABLET | Refills: 0 | Status: SHIPPED | OUTPATIENT
Start: 2023-10-23 | End: 2023-11-28

## 2023-10-23 RX ADMIN — LIDOCAINE HYDROCHLORIDE 10 ML: 10 INJECTION, SOLUTION INFILTRATION; PERINEURAL at 17:45

## 2023-10-23 NOTE — PROGRESS NOTES
1. Fibromyalgia  2. Chronic pain syndrome  9. F11.2 - Continuous opioid dependence (H)  Patient with chronic pain related to arthritis/fibromyalgia.  Has used trigger point injections to keep need for opiates to a minimum.  Has had multiple instances of GI bleeds - so can't tolerate any NSAIDs.      [facility]    Procedure: MSK: Inject Trigger Points, >3    Date/Time: 10/23/2023 6:26 PM    Performed by: Cammy Bui MD  Authorized by: Cammy Bui MD      UNIVERSAL PROTOCOL   Site Marked: Yes  Prior Images Obtained and Reviewed:  NA  Required items: Required blood products, implants, devices and special equipment available (NA)    Patient identity confirmed:  Verbally with patient  NA - No sedation, light sedation, or local anesthesia  Confirmation Checklist:  Patient's identity using two indicators and relevant allergies  Time out: Immediately prior to the procedure a time out was called    Universal Protocol: the Joint Commission Universal Protocol was followed    Preparation: Patient was prepped and draped in usual sterile fashion       ANESTHESIA    Anesthesia:  Local infiltration  Local Anesthetic:  Lidocaine 1% without epinephrine  Anesthetic Total (mL):  10      SEDATION    Patient Sedated: No      PROCEDURE  Describe Procedure: Total of 6 trigger points injected (total of 1.6 ml of Lidocaine in each trigger point).      Length of time physician/provider present for 1:1 monitoring during sedation: 0      - oxyCODONE IR (ROXICODONE) 10 MG tablet; Take 1 tablet (10 mg) by mouth every 6 hours as needed for moderate to severe pain  Dispense: 120 tablet; Refill: 0  - lidocaine 1 % injection 10 mL    3. Pulmonary emphysema, unspecified emphysema type (H)  H/o emphysema - chronic fatigue/chronic SOB associated with this     4. 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 Nonrheumatic aortic  valve stenosis  Patient with aortic valve stenosis - still awaiting intervention -     6. Need for prophylactic vaccination against hepatitis B virus  Low risk - not needed     7. Need for vaccination against respiratory syncytial virus  RSV vaccine discussed - needs to be done at pharmacy     8. Need for immunization against influenza  Due for seasonal flu vaccine - ordered   - INFLUENZA VACCINE 65+ (FLUZONE HD)      Carol Muñiz is a 73 year old, presenting for the following health issues:  Imm/Inj (Trigger point injections, flu shot.)        10/23/2023     5:22 PM   Additional Questions   Roomed by Antonia       History of Present Illness       Reason for visit:  Joint injections        Review of Systems   Constitutional:  Positive for appetite change, fatigue and unexpected weight change. Negative for chills and fever.   HENT:  Negative for congestion, ear pain and sore throat.    Eyes:  Negative for pain and visual disturbance.   Respiratory:  Positive for shortness of breath. Negative for cough.    Cardiovascular:  Negative for chest pain, palpitations and peripheral edema.   Gastrointestinal:  Negative for abdominal pain, constipation and diarrhea.   Endocrine: Negative for polyuria.   Genitourinary:  Negative for dysuria, frequency and vaginal discharge.   Musculoskeletal:  Positive for myalgias. Negative for arthralgias.   Skin:  Negative for rash.   Allergic/Immunologic: Negative.    Neurological:  Positive for weakness. Negative for dizziness, headaches and paresthesias.   Hematological:  Does not bruise/bleed easily.   Psychiatric/Behavioral: Negative.     All other systems reviewed and are negative.           Objective    /63 (BP Location: Right arm, Patient Position: Sitting, Cuff Size: Adult Regular)   Pulse 63   Temp 97.5  F (36.4  C) (Temporal)   Resp 18   LMP  (LMP Unknown)   SpO2 93%   There is no height or weight on file to calculate BMI.  Physical Exam  Vitals reviewed.    Constitutional:       General: She is not in acute distress.     Appearance: Normal appearance. She is ill-appearing (chronically ill appearing).   HENT:      Head: Normocephalic.      Right Ear: Tympanic membrane, ear canal and external ear normal.      Left Ear: Tympanic membrane, ear canal and external ear normal.      Nose: Nose normal.      Mouth/Throat:      Mouth: Mucous membranes are moist.      Pharynx: No posterior oropharyngeal erythema.   Eyes:      Extraocular Movements: Extraocular movements intact.      Conjunctiva/sclera: Conjunctivae normal.      Pupils: Pupils are equal, round, and reactive to light.   Cardiovascular:      Rate and Rhythm: Normal rate and regular rhythm.      Pulses: Normal pulses.      Heart sounds: Normal heart sounds. No murmur heard.  Pulmonary:      Effort: Pulmonary effort is normal.      Breath sounds: Normal breath sounds.   Abdominal:      Palpations: Abdomen is soft. There is no mass.      Tenderness: There is no abdominal tenderness. There is no guarding or rebound.   Musculoskeletal:         General: Tenderness (trigger point tenderness especially at base of neck and upper thoracic) present. No deformity. Normal range of motion.      Cervical back: Normal range of motion and neck supple.   Lymphadenopathy:      Cervical: No cervical adenopathy.   Skin:     General: Skin is warm and dry.   Neurological:      General: No focal deficit present.      Mental Status: She is alert.   Psychiatric:         Mood and Affect: Mood normal.         Behavior: Behavior normal.            No results found for any visits on 10/23/23.            Prior to immunization administration, verified patients identity using patient s name and date of birth. Please see Immunization Activity for additional information.     Screening Questionnaire for Adult Immunization    Are you sick today?   Don't Know   Do you have allergies to medications, food, a vaccine component or latex?   Yes   Have you  ever had a serious reaction after receiving a vaccination?   No   Do you have a long-term health problem with heart, lung, kidney, or metabolic disease (e.g., diabetes), asthma, a blood disorder, no spleen, complement component deficiency, a cochlear implant, or a spinal fluid leak?  Are you on long-term aspirin therapy?   Yes   Do you have cancer, leukemia, HIV/AIDS, or any other immune system problem?   No   Do you have a parent, brother, or sister with an immune system problem?   Yes   In the past 3 months, have you taken medications that affect  your immune system, such as prednisone, other steroids, or anticancer drugs; drugs for the treatment of rheumatoid arthritis, Crohn s disease, or psoriasis; or have you had radiation treatments?   No   Have you had a seizure, or a brain or other nervous system problem?   No   During the past year, have you received a transfusion of blood or blood    products, or been given immune (gamma) globulin or antiviral drug?   Yes   For women: Are you pregnant or is there a chance you could become       pregnant during the next month?   No   Have you received any vaccinations in the past 4 weeks?   No     Immunization questionnaire was positive for at least one answer.        Patient instructed to remain in clinic for 15 minutes afterwards, and to report any adverse reactions.     Screening performed by Antonia Lynch RN on 10/23/2023 at 5:26 PM.

## 2023-10-24 RX ORDER — LIDOCAINE HYDROCHLORIDE 10 MG/ML
10 INJECTION, SOLUTION INFILTRATION; PERINEURAL ONCE
Status: COMPLETED | OUTPATIENT
Start: 2024-10-23 | End: 2023-10-23

## 2023-10-29 ASSESSMENT — ENCOUNTER SYMPTOMS
PALPITATIONS: 0
COUGH: 0
CHILLS: 0
CONSTIPATION: 0
PSYCHIATRIC NEGATIVE: 1
DYSURIA: 0
DIZZINESS: 0
MYALGIAS: 1
FATIGUE: 1
BRUISES/BLEEDS EASILY: 0
ARTHRALGIAS: 0
FEVER: 0
FREQUENCY: 0
ALLERGIC/IMMUNOLOGIC NEGATIVE: 1
HEADACHES: 0
UNEXPECTED WEIGHT CHANGE: 1
WEAKNESS: 1
APPETITE CHANGE: 1
PARESTHESIAS: 0
EYE PAIN: 0
SORE THROAT: 0
ABDOMINAL PAIN: 0
SHORTNESS OF BREATH: 1
DIARRHEA: 0

## 2023-10-31 ENCOUNTER — NURSE TRIAGE (OUTPATIENT)
Dept: FAMILY MEDICINE | Facility: CLINIC | Age: 73
End: 2023-10-31
Payer: COMMERCIAL

## 2023-10-31 DIAGNOSIS — J20.9 ACUTE BRONCHITIS, UNSPECIFIED ORGANISM: ICD-10-CM

## 2023-10-31 RX ORDER — CODEINE PHOSPHATE AND GUAIFENESIN 10; 100 MG/5ML; MG/5ML
1-2 SOLUTION ORAL EVERY 4 HOURS PRN
Qty: 180 ML | Refills: 0 | Status: SHIPPED | OUTPATIENT
Start: 2023-10-31 | End: 2024-01-01

## 2023-10-31 NOTE — TELEPHONE ENCOUNTER
"Called patient to inform her that robitussin has been sent in. She then states \"what about the prednisone and antibiotics that I also asked for? Dr. Brizuela always sends me all 3.\" Does not appear that this was requested/documented in initial triage call.     Will route back to PCP to review additional request.   "

## 2023-10-31 NOTE — TELEPHONE ENCOUNTER
"Dr. Brizuela,    Pt is requesting for Guaifensin-codeine (Robitussin AC) for new acute onset of cough. Please view pended order and sign if agree.     Nurse Triage SBAR    Is this a 2nd Level Triage? YES, LICENSED PRACTITIONER REVIEW IS REQUIRED    Situation: Pt report acute cough with yellow mucus and running nose on Endy 10/29/23.    Background: Pt report this is a recurrent issue every year around this time and is effectively treated with Guaifensin-codeine (Robitussin AC). Pt is a long time smoker, has COPD and on 2.5 L continuous oxygen and is prone to pneumonia.     Assessment: Pt exhibit wet coughing spasm during conversation , does not exhibit shortness of breath, reports running nose, no fever.     Protocol Recommended Disposition:   Home Care    Recommendation: Pt is requesting prescription for Guaifensin-codeine (Robitussin AC) as this \"is the only thing that helps with my coughing\".    Routed to provider    Does the patient meet one of the following criteria for ADS visit consideration? No     Saundra CHEN RN  [facility]      Reason for Disposition   Cough with cold symptoms (e.g., runny nose, postnasal drip, throat clearing)    Additional Information   Negative: Bluish (or gray) lips or face   Negative: SEVERE difficulty breathing (e.g., struggling for each breath, speaks in single words)   Negative: Rapid onset of cough and has hives   Negative: Coughing started suddenly after medicine, an allergic food or bee sting   Negative: Difficulty breathing after exposure to flames, smoke, or fumes   Negative: Sounds like a life-threatening emergency to the triager   Negative: Previous asthma attacks and this feels like asthma attack   Negative: Dry cough (non-productive; no sputum or minimal clear sputum) and within 14 days of COVID-19 Exposure   Negative: MODERATE difficulty breathing (e.g., speaks in phrases, SOB even at rest, pulse 100-120) and still present when not coughing   " "Negative: Chest pain present when not coughing   Negative: Passed out (i.e., fainted, collapsed and was not responding)   Negative: Patient sounds very sick or weak to the triager   Negative: SEVERE coughing spells (e.g., whooping sound after coughing, vomiting after coughing)   Negative: Coughing up katherine-colored (reddish-brown) or blood-tinged sputum   Negative: Fever present > 3 days (72 hours)   Negative: Fever returns after gone for over 24 hours and symptoms worse or not improved   Negative: Using nasal washes and pain medicine > 24 hours and sinus pain persists   Negative: Known COPD or other severe lung disease (i.e., bronchiectasis, cystic fibrosis, lung surgery) and worsening symptoms (i.e., increased sputum purulence or amount, increased breathing difficulty)   Negative: Continuous (nonstop) coughing interferes with work or school and no improvement using cough treatment per Care Advice   Negative: Patient wants to be seen   Negative: Cough has been present for > 3 weeks   Negative: Allergy symptoms are also present (e.g., itchy eyes, clear nasal discharge, postnasal drip)   Negative: Nasal discharge present > 10 days   Negative: Exposure to TB (Tuberculosis)   Negative: Taking an ACE Inhibitor medicine (e.g., benazepril/LOTENSIN, captopril/CAPOTEN, enalapril/VASOTEC, lisinopril/ZESTRIL)    Answer Assessment - Initial Assessment Questions  1. ONSET: \"When did the cough begin?\"       Sundaym 10/29/23  2. SEVERITY: \"How bad is the cough today?\"       Coughing   3. SPUTUM: \"Describe the color of your sputum\" (none, dry cough; clear, white, yellow, green)      Yellow    4. HEMOPTYSIS: \"Are you coughing up any blood?\" If so ask: \"How much?\" (flecks, streaks, tablespoons, etc.)      No    5. DIFFICULTY BREATHING: \"Are you having difficulty breathing?\" If Yes, ask: \"How bad is it?\" (e.g., mild, moderate, severe)     - MILD: No SOB at rest, mild SOB with walking, speaks normally in sentences, can lie down, no " "retractions, pulse < 100.     - MODERATE: SOB at rest, SOB with minimal exertion and prefers to sit, cannot lie down flat, speaks in phrases, mild retractions, audible wheezing, pulse 100-120.     - SEVERE: Very SOB at rest, speaks in single words, struggling to breathe, sitting hunched forward, retractions, pulse > 120       2.5 L continuous oxygen,   Mild    6. FEVER: \"Do you have a fever?\" If Yes, ask: \"What is your temperature, how was it measured, and when did it start?\"      No     7. CARDIAC HISTORY: \"Do you have any history of heart disease?\" (e.g., heart attack, congestive heart failure)       A fib    8. LUNG HISTORY: \"Do you have any history of lung disease?\"  (e.g., pulmonary embolus, asthma, emphysema)      Develop pneumonia at least once a year, COPD    9. PE RISK FACTORS: \"Do you have a history of blood clots?\" (or: recent major surgery, recent prolonged travel, bedridden)      No    10. OTHER SYMPTOMS: \"Do you have any other symptoms?\" (e.g., runny nose, wheezing, chest pain)        Running nose    11. PREGNANCY: \"Is there any chance you are pregnant?\" \"When was your last menstrual period?\"        No    12. TRAVEL: \"Have you traveled out of the country in the last month?\" (e.g., travel history, exposures)        No    Protocols used: Cough-A-OH    "

## 2023-11-01 ENCOUNTER — TELEPHONE (OUTPATIENT)
Dept: FAMILY MEDICINE | Facility: CLINIC | Age: 73
End: 2023-11-01
Payer: COMMERCIAL

## 2023-11-01 RX ORDER — PREDNISONE 20 MG/1
40 TABLET ORAL DAILY
Qty: 10 TABLET | Refills: 0 | Status: SHIPPED | OUTPATIENT
Start: 2023-11-01 | End: 2023-11-06

## 2023-11-01 RX ORDER — AZITHROMYCIN 250 MG/1
TABLET, FILM COATED ORAL
Qty: 6 TABLET | Refills: 0 | Status: SHIPPED | OUTPATIENT
Start: 2023-11-01 | End: 2023-11-06

## 2023-11-01 NOTE — TELEPHONE ENCOUNTER
This was not part of the original request -  without evalutaing a patient it is hard to know what they need - and I would not just order antibiotics without having more information about the need.  Apparently patient is having exacerbation of her underlying pulmonary disease.  Will send meds.    No

## 2023-11-01 NOTE — TELEPHONE ENCOUNTER
Pt calling checking the status on the prednisone and antibiotics medications that she requested due to her cough. Please advise. Pt states she is in need of medication asap. Pt would like a call back letting her know it was sent to pharmacy.  Thanks!

## 2023-11-01 NOTE — TELEPHONE ENCOUNTER
Spoke with patient and relayed message. Discussed being seen in clinic if symptoms do not improve with this regimen. She verbalized understanding.

## 2023-11-15 DIAGNOSIS — J44.0 CHRONIC OBSTRUCTIVE PULMONARY DISEASE WITH ACUTE LOWER RESPIRATORY INFECTION (H): ICD-10-CM

## 2023-11-15 RX ORDER — ALBUTEROL SULFATE 90 UG/1
2 AEROSOL, METERED RESPIRATORY (INHALATION) EVERY 4 HOURS PRN
Qty: 13.4 G | Refills: 3 | Status: SHIPPED | OUTPATIENT
Start: 2023-11-15 | End: 2024-03-25

## 2023-11-16 ENCOUNTER — TELEPHONE (OUTPATIENT)
Dept: CARDIOLOGY | Facility: CLINIC | Age: 73
End: 2023-11-16
Payer: COMMERCIAL

## 2023-11-16 NOTE — TELEPHONE ENCOUNTER
"----- Message from Ammy Lord sent at 9/21/2023  8:36 AM CDT -----  Hey,  Patient called to reschedule her cath and Dr. Bray appt.    She needs to be more \"prepared\" and have \"her mind right\" to move forward with these appointments.    She rescheduled her cath to 11/30-admit at 930 with [facility]  Labs 11/28    I do not have December schedule for Katarina so we will have to set it up later.    Thanks,  Ammy"

## 2023-11-16 NOTE — TELEPHONE ENCOUNTER
Ammy,    Pt called and asked to reschedule her coronary angiogram. Informed pt they should get this done as soon as they can. Will forward to Ammy to setup. Patient verbalizes understanding and agrees with plan. No further questions or concerns at this time.

## 2023-11-16 NOTE — TELEPHONE ENCOUNTER
From: Ammy Lord  Sent: 11/16/2023  10:09 AM CST  To: Jennifer Ospina RN    1/8 admit at 730 with [facility]  H&P at [facility]D  Labs AM of procedure

## 2023-11-28 ENCOUNTER — OFFICE VISIT (OUTPATIENT)
Dept: FAMILY MEDICINE | Facility: CLINIC | Age: 73
End: 2023-11-28
Payer: COMMERCIAL

## 2023-11-28 VITALS
TEMPERATURE: 97.5 F | HEART RATE: 62 BPM | SYSTOLIC BLOOD PRESSURE: 124 MMHG | DIASTOLIC BLOOD PRESSURE: 58 MMHG | RESPIRATION RATE: 20 BRPM | OXYGEN SATURATION: 88 %

## 2023-11-28 DIAGNOSIS — E11.42 DIABETIC POLYNEUROPATHY ASSOCIATED WITH TYPE 2 DIABETES MELLITUS (H): ICD-10-CM

## 2023-11-28 DIAGNOSIS — G89.4 CHRONIC PAIN SYNDROME: ICD-10-CM

## 2023-11-28 DIAGNOSIS — L85.3 DRY SKIN: ICD-10-CM

## 2023-11-28 DIAGNOSIS — K25.7 CHRONIC GASTRIC ULCER WITHOUT HEMORRHAGE AND WITHOUT PERFORATION: ICD-10-CM

## 2023-11-28 DIAGNOSIS — J43.9 PULMONARY EMPHYSEMA, UNSPECIFIED EMPHYSEMA TYPE (H): ICD-10-CM

## 2023-11-28 DIAGNOSIS — M79.7 FIBROMYALGIA: Primary | ICD-10-CM

## 2023-11-28 DIAGNOSIS — I48.91 ATRIAL FIBRILLATION, UNSPECIFIED TYPE (H): ICD-10-CM

## 2023-11-28 PROCEDURE — 20553 NJX 1/MLT TRIGGER POINTS 3/>: CPT | Performed by: FAMILY MEDICINE

## 2023-11-28 PROCEDURE — 99214 OFFICE O/P EST MOD 30 MIN: CPT | Mod: 25 | Performed by: FAMILY MEDICINE

## 2023-11-28 RX ORDER — OXYCODONE HYDROCHLORIDE 10 MG/1
10 TABLET ORAL EVERY 6 HOURS PRN
Qty: 120 TABLET | Refills: 0 | Status: SHIPPED | OUTPATIENT
Start: 2023-11-28 | End: 2023-12-26

## 2023-11-28 RX ORDER — LANOLIN ALCOHOL/MO/W.PET/CERES
CREAM (GRAM) TOPICAL
Qty: 480 ML | Refills: 3 | Status: ON HOLD | OUTPATIENT
Start: 2023-11-28 | End: 2024-02-20

## 2023-11-28 NOTE — PROGRESS NOTES
Of note, patient was scheduled for preop - but she is well more than 30 days prior to planned procedure, so is too early - has been rescheduled.      1. Fibromyalgia  2. Chronic pain syndrome  This is a 74 yo female with chronic pain syndrome - related to fibromyalgia, with trigger points.  Does monthly trigger point injections, 6 trigger points identified today and 1.6 ml Lidocaine injected in each (total 10 ml).  See procedure note.    - lidocaine 1 % 10 mL  - MSK: Inject Trigger Points, >3  - oxyCODONE IR (ROXICODONE) 10 MG tablet; Take 1 tablet (10 mg) by mouth every 6 hours as needed for moderate to severe pain  Dispense: 120 tablet; Refill: 0    [facility]    Procedure: MSK: Inject Trigger Points, >3    Date/Time: 11/28/2023 5:27 PM    Performed by: Cammy Bui MD  Authorized by: Cammy Bui MD      UNIVERSAL PROTOCOL   Site Marked: Yes  Prior Images Obtained and Reviewed:  NA  Required items: Required blood products, implants, devices and special equipment available (NA)    Patient identity confirmed:  Verbally with patient  NA - No sedation, light sedation, or local anesthesia  Confirmation Checklist:  Patient's identity using two indicators and relevant allergies  Time out: Immediately prior to the procedure a time out was called    Universal Protocol: the Joint Commission Universal Protocol was followed    Preparation: Patient was prepped and draped in usual sterile fashion       ANESTHESIA    Anesthesia:  Local infiltration  Local Anesthetic:  Lidocaine 1% without epinephrine  Anesthetic Total (mL):  10      SEDATION    Patient Sedated: No      PROCEDURE  Describe Procedure: 6 trigger points injected - cervicothoracic bilateral  Patient Tolerance:  Patient tolerated the procedure well with no immediate complications  Length of time physician/provider present for 1:1 monitoring during sedation: 0      3. Pulmonary emphysema, unspecified  "emphysema type (H)  COPD/emphysema - patient is severely impaired by respiratory status - reviewed -     4. Diabetic polyneuropathy associated with type 2 diabetes mellitus (H)  Patient with neuropathy associated with type II DM - doesn't follow sugars -d eclines follow up labs today - \"next time\"     5. Dry skin  H/o dry skin - add Eucerin for chronic dryness.    - Eucerin external lotion; Use liberally to dry skin BID and prn.  Dispense: 480 mL; Refill: 3    6. Chronic gastric ulcer without hemorrhage and without perforation  Patient has h/o chronic gastric irritation with no active bleeding currently     7. Atrial fibrillation, unspecified type (H)  Atrial fib - on chronic anticoagulation therapy - no longer on warfarin, but on Eliquis    Carol Muñiz is a 73 year old, presenting for the following health issues:  Trigger Point Injection        11/28/2023     5:09 PM   Additional Questions   Roomed by Mimi       History of Present Illness       Reason for visit:  Trigger Point Injections        Review of Systems   Constitutional:  Positive for fatigue and unexpected weight change (weight loss). Negative for chills and fever.   HENT:  Negative for congestion, ear pain and sore throat.    Eyes:  Negative for pain and visual disturbance.   Respiratory:  Negative for cough and shortness of breath.    Cardiovascular:  Negative for chest pain, palpitations and peripheral edema.   Gastrointestinal:  Negative for abdominal pain, constipation and diarrhea.   Endocrine: Negative for polyuria.   Genitourinary:  Negative for dysuria, frequency and vaginal discharge.   Musculoskeletal:  Positive for arthralgias and myalgias.   Skin:  Negative for rash.   Allergic/Immunologic: Negative.    Neurological:  Negative for dizziness, weakness, headaches and paresthesias.   Hematological:  Bruises/bleeds easily.   Psychiatric/Behavioral: Negative.     All other systems reviewed and are negative.           Objective    BP " 124/58 (BP Location: Left arm, Patient Position: Sitting, Cuff Size: Adult Regular)   Pulse 62   Temp 97.5  F (36.4  C) (Temporal)   Resp 20   LMP  (LMP Unknown)   SpO2 (!) 88%   There is no height or weight on file to calculate BMI.  Physical Exam  Vitals reviewed.   Constitutional:       General: She is not in acute distress.     Appearance: Normal appearance. She is ill-appearing.   HENT:      Head: Normocephalic.      Right Ear: Tympanic membrane, ear canal and external ear normal.      Left Ear: Tympanic membrane, ear canal and external ear normal.      Nose: Nose normal.      Mouth/Throat:      Mouth: Mucous membranes are moist.      Pharynx: No posterior oropharyngeal erythema.   Eyes:      Extraocular Movements: Extraocular movements intact.      Conjunctiva/sclera: Conjunctivae normal.      Pupils: Pupils are equal, round, and reactive to light.   Cardiovascular:      Rate and Rhythm: Normal rate and regular rhythm.      Pulses: Normal pulses.      Heart sounds: Murmur heard.   Pulmonary:      Effort: Pulmonary effort is normal.      Breath sounds: Normal breath sounds.   Abdominal:      Palpations: Abdomen is soft. There is no mass.      Tenderness: There is no abdominal tenderness. There is no guarding or rebound.   Musculoskeletal:         General: Tenderness (multiple trigger points) present. No deformity. Normal range of motion.      Cervical back: Normal range of motion and neck supple.   Lymphadenopathy:      Cervical: No cervical adenopathy.   Skin:     General: Skin is warm and dry.   Neurological:      General: No focal deficit present.      Mental Status: She is alert.   Psychiatric:         Mood and Affect: Mood normal.         Behavior: Behavior normal.            No results found for any visits on 11/28/23.          Prior to immunization administration, verified patients identity using patient s name and date of birth. Please see Immunization Activity for additional information.      Screening Questionnaire for Adult Immunization    Are you sick today?   No   Do you have allergies to medications, food, a vaccine component or latex?   No   Have you ever had a serious reaction after receiving a vaccination?   No   Do you have a long-term health problem with heart, lung, kidney, or metabolic disease (e.g., diabetes), asthma, a blood disorder, no spleen, complement component deficiency, a cochlear implant, or a spinal fluid leak?  Are you on long-term aspirin therapy?   No   Do you have cancer, leukemia, HIV/AIDS, or any other immune system problem?   No   Do you have a parent, brother, or sister with an immune system problem?   No   In the past 3 months, have you taken medications that affect  your immune system, such as prednisone, other steroids, or anticancer drugs; drugs for the treatment of rheumatoid arthritis, Crohn s disease, or psoriasis; or have you had radiation treatments?   No   Have you had a seizure, or a brain or other nervous system problem?   No   During the past year, have you received a transfusion of blood or blood    products, or been given immune (gamma) globulin or antiviral drug?   Yes   For women: Are you pregnant or is there a chance you could become       pregnant during the next month?   No   Have you received any vaccinations in the past 4 weeks?   No     Immunization questionnaire was positive for at least one answer.  Notified Dr. Brizuela.      Patient instructed to remain in clinic for 15 minutes afterwards, and to report any adverse reactions.     Screening performed by Mimi Amador CMA on 11/28/2023 at 5:12 PM.

## 2023-11-29 NOTE — PROGRESS NOTES
Continue statin     [facility]    Medicine Progress Note - Hospitalist Service    Date of Admission:  5/24/2023    Assessment & Plan       SUMMARY: Mary Kay Tejada is a 73 year old female admitted on 5/24/2023. She has a past medical history of paroxysmal atrial flutter on apixaban, COPD, hyperlipidemia, diabetes mellitus type 2 with neuropathy, reflux, dizziness, tobacco use, fibromyalgia and chronic pain syndrome, who presents with shortness of breath and elevated heart rate in the 170s range.    On admission was tachycardic 170, otherwise stable vitals.  BMP nonremarkable, magnesium low 1.5, , CXR shows small to moderate right pleural effusion. Adjacent opacity in the basal right chest consistent with multisegment lower/middle lobe atelectasis, similar to February 2023. There is minimal pleural fluid in the left posterior costophrenic sulcus; lactate 1.3, procalcitonin negative, negative troponin, negative for DVT on US; EKG shows atrial flutter. Patient was given extra diltiazem; admitted and cardiology consulted. Given steroid burst (only 3 days initially) and doxycycline for anti-inflammatory (noted long QTc so avoiding azithromycin). Also hgb noted at 7.9 but no symptoms though prior in February was 9.2. On telemetry.  Following the hemoglobin for anemia serially. Admission diagnoses: atrial flutter, copd exacerbation, and anemia.    Aflutter, aortic stensosis-  Remains rate controlled and transitioned to po digoxin 5/25. Cardiology involved TAVR team in anticipation of future intervention. Jardiance added for both DM2 and heart health    COPD exacerbation-  Increased steroid burst to 40mg po every day 5/25; weaning 02 back to baseline is goal; from 7L 5/25 to now  4L on 5/26/2023. Receiving azithromycin as well. Duonebs.     Acute anemia-  serially following hgb w/ downward trend and reports of darker colored but non-melanotic stools; subsequently with a critical low hgb at 6.9 on 5/26/2023  early AM, receiving transfusion. Also peripheral smear and hemolysis labs ordered (lactate dehydrogenase, LFTs, haptoglobin , as well as a reticulocyte count).    - - - - -   Atrial flutter   Shortness of breath  Long QTc  Assessment: improving on digoxin; transitioned to po per cards 5/25. stable  Plan:   - consult to cardiology, appreciate   - digoxin per cardiology  - telemetry  - monitor clinically  - continue apixaban   - avoid QTc-prolonging medications (ie ordering doxycycline instead of azithromycin)     COPD exacerbation  Acute respiratory failure with hypoxia  Assessment: required 5L on admit; her baseline is 2.5 L PTA.  Suspect the exacerbation could be a trigger exacerbating the above primary issue, aflutter.  Discussed with the patient and her son about steroids and anti-inflammatories.  Given the long QTc we will start with doxycycline for anti-inflammatory antibiotic. Home regimen of ProAir and DuoNeb and Breo. Does not appears overtly volume up and CXR effusions are stable in comparison to prior. Her 02 needs are labile, from 4-7L on only 5/25-- increased steroids to 40mg and appears to be helping-- now at 4L NC on 5/26/2023   Plan:   - started initial steroids: low dose 20mg daily   - on 5/25/2023 increased to 40mg po daily -> continue  - continue doxycycline 100mg po bid, x3 days in total (completing 5/26)  - continue home medications  - duonebs, adjust as per RT  - 02 and wean as able back to baseline of 2.5L NC  - continue PTA lasix   - additional dose iv after transfusion 5/26/2023     Hx of heart failure with preserved ejection fraction  A- small-moderate right sided effusion and minimal of left, otherwise does not appear overtly volume up; had thoracentesis in January. BNP not overtly elevated on admit at 176 vs previously 867 in February. No rales ore extremity edema.   P:  - continue home lasix  - I/Os, weights  - BNP was 176 vs 867 in February  - given anemia, concern for bleed, hold off on  a thoracentesis at this time    Anemia, microcytic   Hx of gastric ulcer  Assessment: The delta was concerning with hemoglobin at 7.9 though earlier was 9.2 in February.  However, no signs or symptoms concerning for bleed, no melena or hematochezia or hematemesis.  She does have a prior history of prior ulcerations though. with a critical low hgb at 6.9 on 5/26/2023 early AM, receiving transfusion. Also peripheral smear and hemolysis labs ordered. Would enlist GI to help if develops symptoms concerning for a GI process.   Plan:  - transfusion AM 5/26/2023, recheck 45 minutes after transfusion completes, discussed w/ RN  - lactate dehydrogenase, LFTs, haptoglobin , as well as a reticulocyte count)  -Serially follow hemoglobin - recheck then q12 hrs  - see below regarding continuing reflux meds   - transfuse if hgb < 7    - consent obtained for pRBC transfusions on 5/25    Reflux  A/P-see issue above as well, continue PPI Carafate     Hyperlipidemia  A/P-continue statin    Diabetes mellitus type 2 with neuropathy  Assessment: On metformin, has neuropathy. Sugars above goal, increased scale 5/25; now stable.  Plan:  - add on Hgb A1c  -Hypoglycemia protocol  -Hold metformin while admitted to the hospital  -Sliding scale insulin, increased to Medium dose   -Continue gabapentin     Hypomagnesemia  A/p- replacement protocol; continue home magnesium tomorrow    Hypertension  A- pressures are soft. On diltizem for both htn and aflutter. On diuretic, continued.   P:  - diltiazem as per cardiology - stopped 5/26/2023   - continue PTA lasix     Dizziness  A/p- stable but can continue PRN meclizine     Tobacco use  A/p- encouraged decreasing and eventual cessation; continue nicotine inhaler     fibromyalgia   chronic pain syndrome  Assessment: Currently pain stable.  She is on a regimen of oxycodone.   Plan:  -Continue for now but modified with a lower range available: 5-10mg q6 hrs prn (instead of scheduled 10)        Diet:  Combination Diet Low Saturated Fat Na <2400mg Diet, No Caffeine Diet    DVT Prophylaxis: Pneumatic Compression Devices, Ambulate every shift and no pharmaceutical given worsening anemia   Chairez Catheter: Not present  Lines: None     Cardiac Monitoring: ACTIVE order. Indication: Tachyarrhythmias, acute (48 hours)  Code Status: Full Code      Clinically Significant Risk Factors             # Hypomagnesemia: Lowest Mg = 1.6 mg/dL in last 2 days, will replace as needed   # Hypoalbuminemia: Lowest albumin = 3 g/dL at 5/26/2023  1:07 PM, will monitor as appropriate     # Hypertension: Noted on problem list         # Moderate Malnutrition: based on nutrition assessment, PRESENT ON ADMISSION        Disposition Plan     Expected Discharge Date: 05/26/2023      Destination: home with family;home with help/services            Rayo Mendoza MD  Hospitalist Service  [facility]  Securely message with [employer] (more info)  Text page via [employer] Paging/Directory   ______________________________________________________________________    Interval History   - no acute events  - pt is on 5L this AM, discussed w/ her goal to wean back to baseline, discussed w/ her nephew visiting  - she has no new symptoms, no abdominal pain overtly  - explained current treatment for anemia and further workup  - she has no new symptoms or pain   - she passed a BM but was not seen by any person    Physical Exam   Vital Signs: Temp: 98.7  F (37.1  C) Temp src: Oral BP: 123/71 Pulse: 99   Resp: 18 SpO2: 94 % O2 Device: Nasal cannula Oxygen Delivery: 3 LPM  Weight: 142 lbs 0 oz    General: alert, oriented, and in no acute distress  Pulmonary: coarse, some decreased breath sounds, but no rales or wheezes; on 6L in AM  CVS: flutter on telemetry; irregularly irregular, no murmurs, rubs, or gallops; no blatant jugular venous distention; mild lower extremity edema most prominent in feet and extremities are warm to the touch  GI: soft,  nontender, BS+, no rebound or guarding, no conspicuous organomegaly   Neuro: nonfocal, moves all extremities of own volition  Psych: appropriate        Medical Decision Making       52 MINUTES SPENT BY ME on the date of service doing chart review, history, exam, documentation & further activities per the note.      Data   ------------------------- PAST 24 HR DATA REVIEWED -----------------------------------------------    I have personally reviewed the following data over the past 24 hrs:    10.8  \   8.7 (L)   / 341     N/A N/A N/A /  144 (H)   N/A N/A N/A \       ALT: 24 AST: 21 AP: 112 TBILI: 0.9   ALB: 3.0 (L) TOT PROTEIN: 6.6 LIPASE: N/A       TSH: N/A T4: N/A A1C: N/A       Ferritin:  N/A % Retic:  2.2 LDH:  307 (H)       Imaging results reviewed over the past 24 hrs:   No results found for this or any previous visit (from the past 24 hour(s)).

## 2023-12-03 ASSESSMENT — ENCOUNTER SYMPTOMS
DIZZINESS: 0
UNEXPECTED WEIGHT CHANGE: 1
WEAKNESS: 0
PSYCHIATRIC NEGATIVE: 1
PARESTHESIAS: 0
PALPITATIONS: 0
DIARRHEA: 0
ALLERGIC/IMMUNOLOGIC NEGATIVE: 1
SHORTNESS OF BREATH: 0
FATIGUE: 1
SORE THROAT: 0
DYSURIA: 0
COUGH: 0
CHILLS: 0
CONSTIPATION: 0
ABDOMINAL PAIN: 0
MYALGIAS: 1
BRUISES/BLEEDS EASILY: 1
FEVER: 0
HEADACHES: 0
FREQUENCY: 0
ARTHRALGIAS: 1
EYE PAIN: 0

## 2023-12-12 DIAGNOSIS — J43.9 PULMONARY EMPHYSEMA, UNSPECIFIED EMPHYSEMA TYPE (H): ICD-10-CM

## 2023-12-12 RX ORDER — BUDESONIDE AND FORMOTEROL FUMARATE DIHYDRATE 160; 4.5 UG/1; UG/1
2 AEROSOL RESPIRATORY (INHALATION) 2 TIMES DAILY
Qty: 10.2 G | Refills: 4 | Status: SHIPPED | OUTPATIENT
Start: 2023-12-12 | End: 2024-04-26

## 2023-12-16 DIAGNOSIS — E11.649 TYPE 2 DIABETES MELLITUS WITH HYPOGLYCEMIA WITHOUT COMA, WITH LONG-TERM CURRENT USE OF INSULIN (H): ICD-10-CM

## 2023-12-16 DIAGNOSIS — Z79.4 TYPE 2 DIABETES MELLITUS WITH HYPOGLYCEMIA WITHOUT COMA, WITH LONG-TERM CURRENT USE OF INSULIN (H): ICD-10-CM

## 2023-12-17 RX ORDER — GABAPENTIN 600 MG/1
600 TABLET ORAL 3 TIMES DAILY
Qty: 270 TABLET | Refills: 2 | Status: SHIPPED | OUTPATIENT
Start: 2023-12-17 | End: 2024-09-24

## 2023-12-26 ENCOUNTER — OFFICE VISIT (OUTPATIENT)
Dept: FAMILY MEDICINE | Facility: CLINIC | Age: 73
End: 2023-12-26
Payer: COMMERCIAL

## 2023-12-26 ENCOUNTER — LAB (OUTPATIENT)
Dept: FAMILY MEDICINE | Facility: CLINIC | Age: 73
End: 2023-12-26

## 2023-12-26 VITALS — SYSTOLIC BLOOD PRESSURE: 110 MMHG | HEART RATE: 62 BPM | TEMPERATURE: 96.9 F | DIASTOLIC BLOOD PRESSURE: 64 MMHG

## 2023-12-26 DIAGNOSIS — I35.0 NONRHEUMATIC AORTIC VALVE STENOSIS: ICD-10-CM

## 2023-12-26 DIAGNOSIS — Z29.11 NEED FOR VACCINATION AGAINST RESPIRATORY SYNCYTIAL VIRUS: ICD-10-CM

## 2023-12-26 DIAGNOSIS — I50.33 ACUTE ON CHRONIC DIASTOLIC HEART FAILURE (H): ICD-10-CM

## 2023-12-26 DIAGNOSIS — Z12.11 COLON CANCER SCREENING: ICD-10-CM

## 2023-12-26 DIAGNOSIS — I48.11 LONGSTANDING PERSISTENT ATRIAL FIBRILLATION (H): ICD-10-CM

## 2023-12-26 DIAGNOSIS — E11.42 DIABETIC POLYNEUROPATHY ASSOCIATED WITH TYPE 2 DIABETES MELLITUS (H): ICD-10-CM

## 2023-12-26 DIAGNOSIS — J43.9 PULMONARY EMPHYSEMA, UNSPECIFIED EMPHYSEMA TYPE (H): ICD-10-CM

## 2023-12-26 DIAGNOSIS — R42 VERTIGO: ICD-10-CM

## 2023-12-26 DIAGNOSIS — Z12.31 ENCOUNTER FOR SCREENING MAMMOGRAM FOR BREAST CANCER: ICD-10-CM

## 2023-12-26 DIAGNOSIS — G89.4 CHRONIC PAIN SYNDROME: ICD-10-CM

## 2023-12-26 DIAGNOSIS — Z01.818 PREOP EXAMINATION: Primary | ICD-10-CM

## 2023-12-26 DIAGNOSIS — F11.20 CONTINUOUS OPIOID DEPENDENCE (H): ICD-10-CM

## 2023-12-26 DIAGNOSIS — M79.7 FIBROMYALGIA: ICD-10-CM

## 2023-12-26 PROCEDURE — 20553 NJX 1/MLT TRIGGER POINTS 3/>: CPT | Performed by: FAMILY MEDICINE

## 2023-12-26 PROCEDURE — 99214 OFFICE O/P EST MOD 30 MIN: CPT | Mod: 25 | Performed by: FAMILY MEDICINE

## 2023-12-26 RX ORDER — RESPIRATORY SYNCYTIAL VIRUS VACCINE 120MCG/0.5
0.5 KIT INTRAMUSCULAR ONCE
Qty: 1 EACH | Refills: 0 | Status: CANCELLED | OUTPATIENT
Start: 2023-12-26 | End: 2023-12-26

## 2023-12-26 RX ORDER — OXYCODONE HYDROCHLORIDE 10 MG/1
10 TABLET ORAL EVERY 6 HOURS PRN
Qty: 120 TABLET | Refills: 0 | Status: SHIPPED | OUTPATIENT
Start: 2023-12-26 | End: 2024-01-29

## 2023-12-26 RX ORDER — IPRATROPIUM BROMIDE AND ALBUTEROL SULFATE 2.5; .5 MG/3ML; MG/3ML
3 SOLUTION RESPIRATORY (INHALATION) EVERY 6 HOURS PRN
Qty: 90 ML | Refills: 0 | Status: SHIPPED | OUTPATIENT
Start: 2023-12-26 | End: 2024-03-04

## 2023-12-26 RX ORDER — MECLIZINE HYDROCHLORIDE 25 MG/1
TABLET ORAL
Qty: 45 TABLET | Refills: 5 | Status: SHIPPED | OUTPATIENT
Start: 2023-12-26 | End: 2024-08-10

## 2023-12-26 NOTE — PROGRESS NOTES
[facility] [address] [place] [address]  Phone: [phone]  Fax: [phone]  Primary Provider: Cammy Bui  Pre-op Performing Provider: CAMMY BUI    {Provider  Link to PREOP SmartSet  Use this to apply standard patient instructions to AVS; includes medication directions, common orders, guidelines for anemia, warfarin, additional testing   :772184}  PREOPERATIVE EVALUATION:  Today's date: 12/26/2023    Mary Kay is a 73 year old, presenting for the following:  Pre-Op Exam and Imm/Inj        12/26/2023     5:14 PM   Additional Questions   Roomed by stanley       Surgical Information:  Surgery/Procedure: [facility]   Surgery Location:   Surgeon: Annie  Surgery Date: 01/08/24  Time of Surgery: 7:30 AM  Where patient plans to recover: At home with family  Fax number for surgical facility: Note does not need to be faxed, will be available electronically in Epic.    {2021 Provider Charting Preference for Preop :454642}    Subjective       HPI related to upcoming procedure: ***        12/26/2023     5:14 PM   Preop Questions   1. Have you ever had a heart attack or stroke? No   2. Have you ever had surgery on your heart or blood vessels, such as a stent placement, a coronary artery bypass, or surgery on an artery in your head, neck, heart, or legs? UNKNOWN - ***   3. Do you have chest pain with activity? No   4. Do you have a history of  heart failure? No   5. Do you currently have a cold, bronchitis or symptoms of other infection? No   6. Do you have a cough, shortness of breath, or wheezing? No   7. Do you or anyone in your family have previous history of blood clots? No   8. Do you or does anyone in your family have a serious bleeding problem such as prolonged bleeding following surgeries or cuts? No   9. Have you ever had problems with anemia or been told to take iron pills? No   10. Have you had any  abnormal blood loss such as black, tarry or bloody stools, or abnormal vaginal bleeding? No   11. Have you ever had a blood transfusion? YES - ***   11a. Have you ever had a transfusion reaction? No   12. Are you willing to have a blood transfusion if it is medically needed before, during, or after your surgery? Yes   13. Have you or any of your relatives ever had problems with anesthesia? No   14. Do you have sleep apnea, excessive snoring or daytime drowsiness? UNKNOWN - ***   15. Do you have any artifical heart valves or other implanted medical devices like a pacemaker, defibrillator, or continuous glucose monitor? No   16. Do you have artificial joints? YES - ***   17. Are you allergic to latex? YES: ***       Health Care Directive:  Patient does not have a Health Care Directive or Living Will: {ADVANCE_DIRECTIVE_STATUS:894490}    Preoperative Review of :  {Mnpmpreport:786386}  {Review MNPMP for all patients per ICSI MNPMP Profile:807205}    {Chronic problem details (Optional) :079305}    Review of Systems  {ROS Preop Choices:077631}    Patient Active Problem List    Diagnosis Date Noted    Protein-calorie malnutrition (H24) 08/16/2023     Priority: Medium    F11.2 - Continuous opioid dependence (H) 08/16/2023     Priority: Medium    Pulmonary emphysema, unspecified emphysema type (H) 07/11/2023     Priority: Medium    Acute on chronic diastolic heart failure (H) 06/05/2023     Priority: Medium    Community acquired pneumonia 06/05/2023     Priority: Medium    Hypotension, unspecified hypotension type 06/04/2023     Priority: Medium    Pleural effusion 06/04/2023     Priority: Medium    SOB (shortness of breath) 05/24/2023     Priority: Medium    Synovial cyst of knee, unspecified laterality 05/24/2023     Priority: Medium    Atrial flutter, unspecified type (H) 05/24/2023     Priority: Medium    Hypoxia 01/10/2023     Priority: Medium    Acute and chronic respiratory failure with hypercapnia (H) 03/27/2022      Priority: Medium    Nicotine dependence 03/26/2022     Priority: Medium     Formatting of this note might be different from the original.  Created by Conversion  [facility] Annotation: Nov 13 2007  7:54AM - Cat Meehan: average 1 ppd      COPD exacerbation (H) 03/26/2022     Priority: Medium    Acute on chronic respiratory failure with hypoxia (H) 03/26/2022     Priority: Medium    Pneumonia of right lower lobe due to infectious organism 03/26/2022     Priority: Medium    Dyspnea, unspecified type 03/26/2022     Priority: Medium    Atrial fibrillation, unspecified type (H) 09/27/2021     Priority: Medium    Chronic gastric ulcer without hemorrhage and without perforation 10/19/2020     Priority: Medium    Advanced directives, counseling/discussion 08/17/2020     Priority: Medium     Son, Mukul, is her decision maker; thinks she wrote a document years ago   (8/2020)        Primary osteoarthritis of both knees 01/17/2020     Priority: Medium    Mixed stress and urge urinary incontinence 08/16/2019     Priority: Medium    Skin lesion 07/17/2019     Priority: Medium     Left lower extemity - medial shin/ankle - small 1 mm with central   ulceration        Dyslipidemia      Priority: Medium    Upper GI bleed 12/13/2018     Priority: Medium    Melena 12/13/2018     Priority: Medium    Anemia due to blood loss, acute 07/18/2018     Priority: Medium    Diabetic polyneuropathy associated with type 2 diabetes mellitus (H) 07/18/2018     Priority: Medium    Chronic anemia 06/27/2018     Priority: Medium    Cataract 11/06/2017     Priority: Medium    Bunion, left 07/19/2017     Priority: Medium    Callus of foot 07/19/2017     Priority: Medium    Nonrheumatic aortic valve stenosis 05/23/2017     Priority: Medium    Gastroenteritis 12/24/2016     Priority: Medium    Atrial flutter (H)      Priority: Medium     Created by Conversion        Syncope 12/22/2016     Priority: Medium    Opacity of lung on imaging study  12/22/2016     Priority: Medium    Acute gastritis 12/22/2016     Priority: Medium    Type 2 diabetes mellitus with hypoglycemia (H)      Priority: Medium    Fibromyalgia      Priority: Medium     Created by Conversion  [facility] Annotation: Nov 13 2007  8:06Cammy Jean Baptiste:   11/07 sees Dr. Mascorro;        Mixed hyperlipidemia      Priority: Medium     Created by Conversion        Osteoarthritis of both knees 08/22/2016     Priority: Medium    Osteoarthritis Of The Shoulder      Priority: Medium     Created by Conversion  Replacement Utility updated for latest IMO load        Chronic Constipation      Priority: Medium     Created by Conversion  Replacement Utility updated for latest IMO load        Chronic Major Depression      Priority: Medium     Created by Conversion  Replacement Utility updated for latest IMO load        Dry Eye Syndrome      Priority: Medium     Created by Conversion  Replacement Utility updated for latest IMO load        Abdominal Pain      Priority: Medium     Created by Conversion  Replacement Utility updated for latest IMO load        Peptic Ulcer      Priority: Medium     Created by [facility] Annotation: Nov 13 2007  8:09AM Cammy Shabazz:   due   to NSAIDS  Replacement Utility updated for latest IMO load        Osteoarthritis, hip, bilateral 06/20/2016     Priority: Medium    Primary osteoarthritis of left knee 06/20/2016     Priority: Medium    Candidal intertrigo 05/11/2016     Priority: Medium    Aspiration pneumonia (H) 03/01/2016     Priority: Medium    Nicotine Dependence      Priority: Medium     Created by [facility] Annotation: Nov 13 2007  7:54AM Cat Powell: average 1   ppd        Essential hypertension      Priority: Medium     Created by Conversion        Chronic pain syndrome      Priority: Medium     Cervical disc disease, rheumatoid arthritis (unable to take NSAIDs)  Controlled substance agreement  "signed/in chart 11/23/15        Controlled substance agreement signed 11/23/2015     Priority: Medium     Cervical disc disease, rheumatoid arthritis (unable to take NSAIDs)  Controlled substance agreement signed/in chart 11/23/15        Cervical neck pain with evidence of disc disease 07/22/2015     Priority: Medium    Headache 07/17/2015     Priority: Medium    Hematoma of abdominal wall 07/05/2015     Priority: Medium    Skin lesion of face 05/22/2015     Priority: Medium    Tendonitis of wrist, right 04/22/2015     Priority: Medium    Menopause 04/06/2015     Priority: Medium    Flashers or floaters of right eye 02/23/2015     Priority: Medium    Tendonitis of wrist, left 01/21/2015     Priority: Medium    Trigger point of thoracic region 01/21/2015     Priority: Medium    Generalized osteoarthrosis, involving multiple sites 01/14/2015     Priority: Medium    Vertigo 12/17/2014     Priority: Medium    Rotator cuff tendonitis 12/17/2014     Priority: Medium     Left sided        Paroxysmal Atrial Fibrillation      Priority: Medium     Created by Conversion        Chronic Reflux Esophagitis      Priority: Medium     Created by Conversion        Benign Adenomatous Polyp Of The Large Intestine      Priority: Medium     Created by Conversion  [facility] Annotation: Aug  1 2008  8:10PM - Cammy Bui:   followup colonoscopy 2006        Abnormal Weight Loss      Priority: Medium     Created by Conversion        Onychomycosis Of The Toenails      Priority: Medium     Created by Conversion        Diarrhea      Priority: Medium     Created by Conversion        Ganglion Of The Left Wrist      Priority: Medium     Created by Conversion        Larynx Edema      Priority: Medium     Created by Conversion  [facility] Annotation: Jan 8 2008  9:19AM - Cammy Bui:   \"Geronimo's\" edema secondary to smoking;        Obesity      Priority: Medium     Created by Conversion        Medial Epicondylitis  "     Priority: Medium     Created by Conversion  [facility] Annotation: Feb 13 2012  9:11AM - Cammy Bui:   right        Skin Lesion      Priority: Medium     Created by Conversion        Urinary Incontinence      Priority: Medium     Created by Conversion        Hypokalemia      Priority: Medium     Created by Conversion        Muscle Cramps In The Calf      Priority: Medium     Created by Conversion        Edema      Priority: Medium     Created by Conversion        Lump In / On The Skin      Priority: Medium     Created by Conversion        Nausea      Priority: Medium     Created by Conversion          Past Medical History:   Diagnosis Date    Anemia     Aortic stenosis     Atrial fibrillation (H)     Atrial flutter (H)     Benign neoplasm of adenomatous polyp     large intestine     Chronic constipation     Chronic pain syndrome     COPD (chronic obstructive pulmonary disease) (H)     Oxygen at night     Dependence on supplemental oxygen     Oxygen at noc, during the day as needed    Depression     Diabetes mellitus (H)     Dry eye syndrome     Fibromyalgia     Ganglion     left wrist    GERD (gastroesophageal reflux disease)     Hyperlipidemia     Hypertension     Hypokalemia     Infective otitis externa, unspecified     Created by Conversion     Larynx edema     Lung disease     Malignant neoplasm of vulva (H)     Created by Conversion [facility] Annotation: Apr 17 2007  8:24AM - Cammy Bui:  resection per Dr. Alfonso Mane 9/06;  Replacement Utility updated for latest IMO load    Medial epicondylitis     Onychomycosis     Osteoarthritis     Peptic ulcer     Polyneuropathy     Vulvar malignant neoplasm (H)      Past Surgical History:   Procedure Laterality Date    BIOPSY BREAST Right     BIOPSY BREAST Right 01/28/2015    BIOPSY BREAST Right 1/28/2015    Procedure: RIGHT BREAST BIOPSY AFTER WIRE LOCALIZATION AT 0940;  Surgeon: Renée Soriano MD;  Location: [facility] [place];   "Service:     BIOPSY OF BREAST, INCISIONAL      Description: Incisional Breast Biopsy;  Recorded: 11/13/2007;  Comments: benign    COLONOSCOPY N/A 6/14/2019    Procedure: COLONOSCOPY;  Surgeon: Eduardo Mora MD;  Location: [facility] [place];  Service: Gastroenterology    ESOPHAGOSCOPY, GASTROSCOPY, DUODENOSCOPY (EGD), COMBINED N/A 11/6/2018    Procedure: ESOPHAGOGASTRODUODENOSCOPY;  Surgeon: Lit Fernando MD;  Location: [facility] [place];  Service:     HYSTERECTOMY      JOINT REPLACEMENT Left     TKA    PICC TRIPLE LUMEN PLACEMENT  1/12/2023         NH ABLATE HEART DYSRHYTHM FOCUS      Description: Catheter Ablation Atrial Fibrillation;  Recorded: 07/31/2012;  Comments: 7/24/12 PVI with Dr. Gardiner and nilay to all 5 pulm veins and CTI fl ablation line as well.    ZZC SUPRACERV ABD HYSTERECTOMY      Description: Supracervical Hysterectomy;  Proc Date: 01/01/1985;  Comments: some cervix left!; ovaries intact; done for bleeding     Current Outpatient Medications   Medication Sig Dispense Refill    ACCU-CHEK SOFTCLIX LANCETS lancets [ACCU-CHEK SOFTCLIX LANCETS LANCETS] TEST THREE TIMES DAILY 300 each 3    albuterol (PROAIR HFA/PROVENTIL HFA/VENTOLIN HFA) 108 (90 Base) MCG/ACT inhaler INHALE 2 PUFFS INTO THE LUNGS EVERY 4 HOURS AS NEEDED FOR WHEEZING 13.4 g 3    apixaban ANTICOAGULANT (ELIQUIS) 5 MG tablet Take 1 tablet (5 mg) by mouth 2 times daily 180 tablet 2    atorvastatin (LIPITOR) 20 MG tablet TAKE 1 TABLET(20 MG) BY MOUTH AT BEDTIME 90 tablet 3    BD ULTRA-FINE SHORT PEN NEEDLE 31 gauge x 5/16\" Ndle [BD ULTRA-FINE SHORT PEN NEEDLE 31 GAUGE X 5/16\" NDLE] TEST FOUR TIMES DAILY WITH MEALS AND AT BEDTIME 400 each 3    blood glucose (ONETOUCH VERIO IQ) test strip TEST THREE TIMES DAILY 300 strip 0    blood-glucose meter (ONETOUCH VERIO IQ METER) Misc [BLOOD-GLUCOSE METER (ONETOUCH VERIO IQ METER) MISC] Check blood sugar three times a day. 1 each 0    digoxin (LANOXIN) 125 MCG tablet TAKE 1 TABLET(125 MCG) BY " MOUTH DAILY 90 tablet 2    diltiazem ER COATED BEADS (CARDIZEM CD/CARTIA XT) 120 MG 24 hr capsule TAKE 1 CAPSULE(120 MG) BY MOUTH DAILY 90 capsule 2    Eucerin external lotion Use liberally to dry skin BID and prn. 480 mL 3    furosemide (LASIX) 20 MG tablet TAKE 1 TABLET(20 MG) BY MOUTH TWICE DAILY 180 tablet 3    gabapentin (NEURONTIN) 600 MG tablet TAKE 1 TABLET(600 MG) BY MOUTH THREE TIMES DAILY 270 tablet 2    generic lancets (FINGERSTIX LANCETS) [GENERIC LANCETS (FINGERSTIX LANCETS)] Dispense brand per patient's insurance at pharmacy discretion. 300 each 0    ipratropium - albuterol 0.5 mg/2.5 mg/3 mL (DUONEB) 0.5-2.5 (3) MG/3ML neb solution Take 1 vial (3 mLs) by nebulization every 6 hours as needed for shortness of breath or wheezing 90 mL 0    JARDIANCE 10 MG TABS tablet TAKE 1 TABLET(10 MG) BY MOUTH DAILY 90 tablet 2    magnesium oxide (MAG-OX) 400 MG tablet Take 1 tablet (400 mg) by mouth daily 30 tablet 0    meclizine (ANTIVERT) 25 MG tablet TAKE 1 TABLET(25 MG) BY MOUTH THREE TIMES DAILY AS NEEDED FOR DIZZINESS OR NAUSEA 45 tablet 5    metoprolol tartrate (LOPRESSOR) 25 MG tablet TAKE 1 TABLET(25 MG) BY MOUTH TWICE DAILY 180 tablet 2    Nutritional Supplements (ENSURE COMPLETE) LIQD Take 1 Can by mouth daily 7110 mL 4    nystatin (NYSTOP) 903572 UNIT/GM external powder APPLY TOPICALLY TO THE AFFECTED AREA 2-3 TIMES DAILY AS NEEDED 60 g 3    omeprazole (PRILOSEC) 20 MG DR capsule Take 20 mg by mouth daily      oxyCODONE IR (ROXICODONE) 10 MG tablet Take 1 tablet (10 mg) by mouth every 6 hours as needed for moderate to severe pain 120 tablet 0    potassium chloride ER (KLOR-CON M) 20 MEQ CR tablet TAKE 1 TABLET(20 MEQ) BY MOUTH DAILY 90 tablet 3    sucralfate (CARAFATE) 1 GM tablet TAKE 1 TABLET BY MOUTH FOUR TIMES DAILY. CRUSH TABLET AND MIX IT WITH A LITTLE WATER THEN SWALLOW 360 tablet 3    SYMBICORT 160-4.5 MCG/ACT Inhaler INHALE 2 PUFFS INTO THE LUNGS TWICE DAILY 10.2 g 4     "diaper,brief,adult,disposable (ADULT BRIEFS - LARGE) Misc [DIAPER,BRIEF,ADULT,DISPOSABLE (ADULT BRIEFS - LARGE) MISC] Use 3-4 daily as needed for incontinence 120 each 6    guaiFENesin-codeine (ROBITUSSIN AC) 100-10 MG/5ML solution Take 5-10 mLs by mouth every 4 hours as needed for cough (Patient not taking: Reported on 12/26/2023) 180 mL 0    naloxone (NARCAN) 4 MG/0.1ML nasal spray Spray 1 spray (4 mg) into one nostril alternating nostrils once as needed for opioid reversal every 2-3 minutes until assistance arrives (Patient not taking: Reported on 12/26/2023) 0.2 mL 0    nicotine (NICOTROL) 10 MG inhaler Use 1 cartridge as needed for urge to smoke by puffing over course of 20min.  Use 6-16 cart/day; reduce number of cart/day over 6-12 weeks. (Patient not taking: Reported on 12/26/2023) 168 each 1    triamcinolone (KENALOG) 0.1 % external cream Apply topically 2 times daily Apply topically sparingly BID to lower extremity rash (Patient not taking: Reported on 12/26/2023) 45 g 1       Allergies   Allergen Reactions    Celebrex [Celecoxib] Rash     patient had butterfly rash - \"lupus-like\"      Latex Rash        Social History     Tobacco Use    Smoking status: Some Days     Packs/day: .25     Types: Cigarettes     Passive exposure: Never    Smokeless tobacco: Never    Tobacco comments:     seen by TTS inpatient on 3/31/22   Substance Use Topics    Alcohol use: Yes     Comment: Alcoholic Drinks/day: very little     {FAMILY HISTORY (Optional):837032206}  History   Drug Use No         Objective     LMP  (LMP Unknown)     Physical Exam  {EXAM Preop Choices:010012}    Recent Labs   Lab Test 09/20/23  1136 06/30/23  1352 05/25/23  1806 05/25/23  0507 02/09/23  0441 02/08/23  0759 01/19/23  0438 01/18/23  0522   HGB 14.3 12.9   < > 7.3*   < > 9.2*  --   --     206   < > 357   < > 432  --   --    INR  --   --   --   --   --  1.34*  --  1.35*    140   < > 136   < > 142   < > 139   POTASSIUM 5.1 4.6   < > 4.0   " < > 4.6   < > 3.6   CR 0.73 0.71   < > 0.86   < > 0.70   < > 0.67   A1C 6.1*  --   --  6.1*  --   --   --   --     < > = values in this interval not displayed.        Diagnostics:  {LABS:989091}   {EK}    Revised Cardiac Risk Index (RCRI):  The patient has the following serious cardiovascular risks for perioperative complications:  {PREOP REVISED CARDIAC RISK INDEX (RCRI) :799976}     RCRI Interpretation: {REVISED CARDIAC RISK INTERPRETATION :526315}         Signed Electronically by: SAVANA SILVER MD  Copy of this evaluation report is provided to requesting physician.    {Provider Resources  Preop [facility] Preop Guidelines  Revised Cardiac Risk Index :275611}

## 2023-12-26 NOTE — PROGRESS NOTES
[facility] [address] [place] [address]  Phone: [phone]  Fax: [phone]  Primary Provider: Cammy Bui  Pre-op Performing Provider: CAMMY BUI      PREOPERATIVE EVALUATION:  Today's date: 12/26/2023    Mary Kay is a 73 year old, presenting for the following:  Pre-Op Exam and Imm/Inj        12/26/2023     5:14 PM   Additional Questions   Roomed by stanley       Surgical Information:  Surgery/Procedure:     Procedure Laterality Anesthesia   Coronary Angiogram          Surgery Location: [facility]  Surgeon: Annie  Surgery Date:01/08/24  Time of Surgery: 7;30 am  Where patient plans to recover: At home with family  Fax number for surgical facility: Note does not need to be faxed, will be available electronically in Epic.    Assessment & Plan     The proposed surgical procedure is considered INTERMEDIATE risk.    Problem List Items Addressed This Visit       Chronic pain syndrome    Relevant Medications    oxyCODONE IR (ROXICODONE) 10 MG tablet    Paroxysmal Atrial Fibrillation    Fibromyalgia    Relevant Medications    oxyCODONE IR (ROXICODONE) 10 MG tablet    Vertigo    Relevant Medications    meclizine (ANTIVERT) 25 MG tablet    Nonrheumatic aortic valve stenosis    Diabetic polyneuropathy associated with type 2 diabetes mellitus (H)    Relevant Orders    Hemoglobin A1c    Acute on chronic diastolic heart failure (H)    Pulmonary emphysema, unspecified emphysema type (H)    Relevant Medications    ipratropium - albuterol 0.5 mg/2.5 mg/3 mL (DUONEB) 0.5-2.5 (3) MG/3ML neb solution    F11.2 - Continuous opioid dependence (H)     Other Visit Diagnoses       Preop examination    -  Primary    Need for vaccination against respiratory syncytial virus        Colon cancer screening        Relevant Orders    COLOGUARD(EXACT SCIENCES)    Encounter for screening mammogram for breast cancer        Relevant Orders    *MA Screening Digital  Bilateral          This is a 74 yo female here for preop evaluation (plans coronary angiograms 1/8/2024  - to prep for possible aortic valve intervention).  Patient is quite reluctant to pursue any of this.  She is chronically ill, has chronic pain syndrome due to arthritis/fibromyalgia.  Does not tolerate any NSAIDs due to her h/o gastric ulcers with bleeding.  So - has taken long term Oxycodone.  No h/o withdrawal.  Does use trigger point injections for her fibromyalgia as well - does these monthly - done today:  See procedure note below.      As well -    Type II DM -   Hemoglobin A1C   Date Value Ref Range Status   09/20/2023 6.1 (H) 0.0 - 5.6 % Final     Comment:     Normal <5.7%   Prediabetes 5.7-6.4%    Diabetes 6.5% or higher     Note: Adopted from ADA consensus guidelines.     Sugars have been well controlled   2.  COPD/emphysema - still smokes occasionally - also oxygen dependent - uses mostly at home (never wears it to office).  Oxygen sats are low today -   3.  Aortic valve stenosis - patient has fairly severe valve narrowing - reports that they are talking about valve replacement - we discussed that this would be important to control further symptoms  4.  Atrial fibrillation - stable pulse; on Eliquis  5.  Chronic heart failure - generally stable symptoms - but patient is fairly impaired.     This is a patient who is chronically ill - has increasing symptoms associated with aortic valve stenosis - scheduled for angios prior to aortic valve intervention.  Patient is somewhat undecided about what she wants to do.  She is a high risk patient, but I have encouraged her to move forward - we have discussed that this is not going to improve with time.  She does need to stop smoking - she is not inclined to stop - reports she smokes far less than previous.     As well:  Chronic pain syndrome/fibromyalgia/chronic use of opioids   Procedures  Procedure Note - Trigger Point Injections    Consent obtained:  verbal    Indication:  pain, fibromyalgia    6  trigger points identified.  Each trigger point swabbed x 3 with Betadine swab.  Each trigger point then injected with 1.6 ml of 1% Lidocaine (no epi).    Total volume injected:  10 ml    Complications:  None, patient tolerated procedure well.    Plan:  Discussed care with patient.  RTC for further injections in 30 days prn.            Possible Sleep Apnea:           Risks and Recommendations:  The patient has the following additional risks and recommendations for perioperative complications:   - Consult Hospitalist / IM to assist with post-op medical management    Antiplatelet or Anticoagulation Medication Instructions:  On Eliquis - per cardiology whether she should hold this or not.     Additional Medication Instructions:      RECOMMENDATION:  APPROVAL GIVEN to proceed with proposed procedure, without further diagnostic evaluation.  Biggest concerns are cardiac and pulmonary.  This is a cardiac intervention - cardiologist has felt that this is a necessary intervention.        Subjective       HPI related to upcoming procedure: this is a 74 yo female with multiple chronic medical problems.  She does have chronic aortic valve stenosis - and is thriving poorly!  Would benefit from intervention.    Unfortunately, she continues to smoke in spite of her cardiac/pulmonary issues.          12/26/2023     5:14 PM   Preop Questions   1. Have you ever had a heart attack or stroke? No   2. Have you ever had surgery on your heart or blood vessels, such as a stent placement, a coronary artery bypass, or surgery on an artery in your head, neck, heart, or legs? UNKNOWN - none known   3. Do you have chest pain with activity? No   4. Do you have a history of  heart failure? No   5. Do you currently have a cold, bronchitis or symptoms of other infection? No   6. Do you have a cough, shortness of breath, or wheezing? No   7. Do you or anyone in your family have previous history of blood  clots? No   8. Do you or does anyone in your family have a serious bleeding problem such as prolonged bleeding following surgeries or cuts? No   9. Have you ever had problems with anemia or been told to take iron pills? No   10. Have you had any abnormal blood loss such as black, tarry or bloody stools, or abnormal vaginal bleeding? No   11. Have you ever had a blood transfusion? YES - with gastric ulcer    11a. Have you ever had a transfusion reaction? No   12. Are you willing to have a blood transfusion if it is medically needed before, during, or after your surgery? Yes   13. Have you or any of your relatives ever had problems with anesthesia? No   14. Do you have sleep apnea, excessive snoring or daytime drowsiness? UNKNOWN - none known  (wouldn't go to a sleep test)   15. Do you have any artifical heart valves or other implanted medical devices like a pacemaker, defibrillator, or continuous glucose monitor? No   16. Do you have artificial joints? YES - left knee   17. Are you allergic to latex? YES: breaks out in red bumpy rash       Health Care Directive:  Patient does not have a Health Care Directive or Living Will: no written documents - identifies her son, Mukul Tejada, as the decision maker.      Preoperative Review of :   reviewed - controlled substances reflected in medication list.      Status of Chronic Conditions:  See problem list for active medical problems.  Problems all longstanding and stable, except as noted/documented.  See ROS for pertinent symptoms related to these conditions.    Review of Systems   Constitutional:  Positive for fatigue. Negative for chills, fever and unexpected weight change.   HENT:  Negative for congestion, ear pain and sore throat.    Eyes:  Negative for pain and visual disturbance.   Respiratory:  Positive for shortness of breath (chronic; chronic use of oxygen). Negative for cough.    Cardiovascular:  Negative for chest pain, palpitations and peripheral edema.    Gastrointestinal:  Negative for abdominal pain, constipation and diarrhea.   Endocrine: Negative for polyuria.   Genitourinary:  Negative for dysuria, frequency and vaginal discharge.   Musculoskeletal:  Positive for arthralgias and myalgias.   Skin:  Negative for rash.   Allergic/Immunologic: Negative.    Neurological:  Negative for dizziness, weakness, headaches and paresthesias.   Hematological:  Does not bruise/bleed easily.   Psychiatric/Behavioral:  Positive for mood changes. The patient is nervous/anxious.    All other systems reviewed and are negative.      Oxygen low today - not on oxygen in the office -  uses oxygen at home  Has full dentures - no teeth      Patient Active Problem List    Diagnosis Date Noted    Protein-calorie malnutrition (H24) 08/16/2023     Priority: Medium    F11.2 - Continuous opioid dependence (H) 08/16/2023     Priority: Medium    Pulmonary emphysema, unspecified emphysema type (H) 07/11/2023     Priority: Medium    Acute on chronic diastolic heart failure (H) 06/05/2023     Priority: Medium    Community acquired pneumonia 06/05/2023     Priority: Medium    Hypotension, unspecified hypotension type 06/04/2023     Priority: Medium    Pleural effusion 06/04/2023     Priority: Medium    SOB (shortness of breath) 05/24/2023     Priority: Medium    Synovial cyst of knee, unspecified laterality 05/24/2023     Priority: Medium    Atrial flutter, unspecified type (H) 05/24/2023     Priority: Medium    Hypoxia 01/10/2023     Priority: Medium    Acute and chronic respiratory failure with hypercapnia (H) 03/27/2022     Priority: Medium    Nicotine dependence 03/26/2022     Priority: Medium     Formatting of this note might be different from the original.  Created by [facility] Annotation: Nov 13 2007  7:54AM - Cat Meehan: average 1 ppd      COPD exacerbation (H) 03/26/2022     Priority: Medium    Acute on chronic respiratory failure with hypoxia (H) 03/26/2022      Priority: Medium    Pneumonia of right lower lobe due to infectious organism 03/26/2022     Priority: Medium    Dyspnea, unspecified type 03/26/2022     Priority: Medium    Atrial fibrillation, unspecified type (H) 09/27/2021     Priority: Medium    Chronic gastric ulcer without hemorrhage and without perforation 10/19/2020     Priority: Medium    Advanced directives, counseling/discussion 08/17/2020     Priority: Medium     Son, Mukul, is her decision maker; thinks she wrote a document years ago   (8/2020)        Primary osteoarthritis of both knees 01/17/2020     Priority: Medium    Mixed stress and urge urinary incontinence 08/16/2019     Priority: Medium    Skin lesion 07/17/2019     Priority: Medium     Left lower extemity - medial shin/ankle - small 1 mm with central   ulceration        Dyslipidemia      Priority: Medium    Upper GI bleed 12/13/2018     Priority: Medium    Melena 12/13/2018     Priority: Medium    Anemia due to blood loss, acute 07/18/2018     Priority: Medium    Diabetic polyneuropathy associated with type 2 diabetes mellitus (H) 07/18/2018     Priority: Medium    Chronic anemia 06/27/2018     Priority: Medium    Cataract 11/06/2017     Priority: Medium    Bunion, left 07/19/2017     Priority: Medium    Callus of foot 07/19/2017     Priority: Medium    Nonrheumatic aortic valve stenosis 05/23/2017     Priority: Medium    Gastroenteritis 12/24/2016     Priority: Medium    Atrial flutter (H)      Priority: Medium     Created by Conversion        Syncope 12/22/2016     Priority: Medium    Opacity of lung on imaging study 12/22/2016     Priority: Medium    Acute gastritis 12/22/2016     Priority: Medium    Type 2 diabetes mellitus with hypoglycemia (H)      Priority: Medium    Fibromyalgia      Priority: Medium     Created by Conversion  [facility] Annotation: Nov 13 2007  8:06Yusuf Jean Baptistee:   11/07 sees Dr. Mascorro;        Mixed hyperlipidemia      Priority: Medium      Created by Conversion        Osteoarthritis of both knees 08/22/2016     Priority: Medium    Osteoarthritis Of The Shoulder      Priority: Medium     Created by Conversion  Replacement Utility updated for latest IMO load        Chronic Constipation      Priority: Medium     Created by Conversion  Replacement Utility updated for latest IMO load        Chronic Major Depression      Priority: Medium     Created by Conversion  Replacement Utility updated for latest IMO load        Dry Eye Syndrome      Priority: Medium     Created by Conversion  Replacement Utility updated for latest IMO load        Abdominal Pain      Priority: Medium     Created by Conversion  Replacement Utility updated for latest IMO load        Peptic Ulcer      Priority: Medium     Created by Conversion  [facility] Annotation: Nov 13 2007  8:09AM - Cammy Bui:   due   to NSAIDS  Replacement Utility updated for latest IMO load        Osteoarthritis, hip, bilateral 06/20/2016     Priority: Medium    Primary osteoarthritis of left knee 06/20/2016     Priority: Medium    Candidal intertrigo 05/11/2016     Priority: Medium    Aspiration pneumonia (H) 03/01/2016     Priority: Medium    Nicotine Dependence      Priority: Medium     Created by Conversion  [facility] Annotation: Nov 13 2007  7:54AM - Cat Meehan: average 1   ppd        Essential hypertension      Priority: Medium     Created by Conversion        Chronic pain syndrome      Priority: Medium     Cervical disc disease, rheumatoid arthritis (unable to take NSAIDs)  Controlled substance agreement signed/in chart 11/23/15        Controlled substance agreement signed 11/23/2015     Priority: Medium     Cervical disc disease, rheumatoid arthritis (unable to take NSAIDs)  Controlled substance agreement signed/in chart 11/23/15        Cervical neck pain with evidence of disc disease 07/22/2015     Priority: Medium    Headache 07/17/2015     Priority: Medium    Hematoma of  "abdominal wall 07/05/2015     Priority: Medium    Skin lesion of face 05/22/2015     Priority: Medium    Tendonitis of wrist, right 04/22/2015     Priority: Medium    Menopause 04/06/2015     Priority: Medium    Flashers or floaters of right eye 02/23/2015     Priority: Medium    Tendonitis of wrist, left 01/21/2015     Priority: Medium    Trigger point of thoracic region 01/21/2015     Priority: Medium    Generalized osteoarthrosis, involving multiple sites 01/14/2015     Priority: Medium    Vertigo 12/17/2014     Priority: Medium    Rotator cuff tendonitis 12/17/2014     Priority: Medium     Left sided        Paroxysmal Atrial Fibrillation      Priority: Medium     Created by Conversion        Chronic Reflux Esophagitis      Priority: Medium     Created by Conversion        Benign Adenomatous Polyp Of The Large Intestine      Priority: Medium     Created by Conversion  [facility] Annotation: Aug  1 2008  8:10PM Cammy Shabazz:   followup colonoscopy 2006        Abnormal Weight Loss      Priority: Medium     Created by Conversion        Onychomycosis Of The Toenails      Priority: Medium     Created by Conversion        Diarrhea      Priority: Medium     Created by Conversion        Ganglion Of The Left Wrist      Priority: Medium     Created by Conversion        Larynx Edema      Priority: Medium     Created by Conversion  [facility] Annotation: Jan 8 2008  9:19AM - Cammy Bui:   \"Geronimo's\" edema secondary to smoking;        Obesity      Priority: Medium     Created by Conversion        Medial Epicondylitis      Priority: Medium     Created by Conversion  [facility] Annotation: Feb 13 2012  9:11AM - Cammy Bui:   right        Skin Lesion      Priority: Medium     Created by Conversion        Urinary Incontinence      Priority: Medium     Created by Conversion        Hypokalemia      Priority: Medium     Created by Conversion        Muscle Cramps In The Calf      " Priority: Medium     Created by Conversion        Edema      Priority: Medium     Created by Conversion        Lump In / On The Skin      Priority: Medium     Created by Conversion        Nausea      Priority: Medium     Created by Conversion          Past Medical History:   Diagnosis Date    Anemia     Aortic stenosis     Atrial fibrillation (H)     Atrial flutter (H)     Benign neoplasm of adenomatous polyp     large intestine     Chronic constipation     Chronic pain syndrome     COPD (chronic obstructive pulmonary disease) (H)     Oxygen at night     Dependence on supplemental oxygen     Oxygen at noc, during the day as needed    Depression     Diabetes mellitus (H)     Dry eye syndrome     Fibromyalgia     Ganglion     left wrist    GERD (gastroesophageal reflux disease)     Hyperlipidemia     Hypertension     Hypokalemia     Infective otitis externa, unspecified     Created by Conversion     Larynx edema     Lung disease     Malignant neoplasm of vulva (H)     Created by Conversion [facility] Annotation: Apr 17 2007  8:24AM - Cammy Bui:  resection per Dr. Alfonso Mane 9/06;  Replacement Utility updated for latest IMO load    Medial epicondylitis     Onychomycosis     Osteoarthritis     Peptic ulcer     Polyneuropathy     Vulvar malignant neoplasm (H)      Past Surgical History:   Procedure Laterality Date    BIOPSY BREAST Right     BIOPSY BREAST Right 01/28/2015    BIOPSY BREAST Right 1/28/2015    Procedure: RIGHT BREAST BIOPSY AFTER WIRE LOCALIZATION AT 0940;  Surgeon: Renée Soriano MD;  Location: [facility];  Service:     BIOPSY OF BREAST, INCISIONAL      Description: Incisional Breast Biopsy;  Recorded: 11/13/2007;  Comments: benign    COLONOSCOPY N/A 6/14/2019    Procedure: COLONOSCOPY;  Surgeon: Eduardo Mora MD;  Location: [facility];  Service: Gastroenterology    ESOPHAGOSCOPY, GASTROSCOPY, DUODENOSCOPY (EGD), COMBINED N/A 11/6/2018    Procedure:  "ESOPHAGOGASTRODUODENOSCOPY;  Surgeon: Lit Fernando MD;  Location: [facility] OR;  Service:     HYSTERECTOMY      JOINT REPLACEMENT Left     TKA    PICC TRIPLE LUMEN PLACEMENT  1/12/2023         MS ABLATE HEART DYSRHYTHM FOCUS      Description: Catheter Ablation Atrial Fibrillation;  Recorded: 07/31/2012;  Comments: 7/24/12 PVI with Dr. Gardiner and nilay to all 5 pulm veins and CTI fl ablation line as well.    ZZC SUPRACERV ABD HYSTERECTOMY      Description: Supracervical Hysterectomy;  Proc Date: 01/01/1985;  Comments: some cervix left!; ovaries intact; done for bleeding     Current Outpatient Medications   Medication Sig Dispense Refill    ACCU-CHEK SOFTCLIX LANCETS lancets [ACCU-CHEK SOFTCLIX LANCETS LANCETS] TEST THREE TIMES DAILY 300 each 3    albuterol (PROAIR HFA/PROVENTIL HFA/VENTOLIN HFA) 108 (90 Base) MCG/ACT inhaler INHALE 2 PUFFS INTO THE LUNGS EVERY 4 HOURS AS NEEDED FOR WHEEZING 13.4 g 3    apixaban ANTICOAGULANT (ELIQUIS) 5 MG tablet Take 1 tablet (5 mg) by mouth 2 times daily 180 tablet 2    atorvastatin (LIPITOR) 20 MG tablet TAKE 1 TABLET(20 MG) BY MOUTH AT BEDTIME 90 tablet 3    BD ULTRA-FINE SHORT PEN NEEDLE 31 gauge x 5/16\" Ndle [BD ULTRA-FINE SHORT PEN NEEDLE 31 GAUGE X 5/16\" NDLE] TEST FOUR TIMES DAILY WITH MEALS AND AT BEDTIME 400 each 3    blood glucose (ONETOUCH VERIO IQ) test strip TEST THREE TIMES DAILY 300 strip 0    blood-glucose meter (ONETOUCH VERIO IQ METER) Misc [BLOOD-GLUCOSE METER (ONETOUCH VERIO IQ METER) MISC] Check blood sugar three times a day. 1 each 0    digoxin (LANOXIN) 125 MCG tablet TAKE 1 TABLET(125 MCG) BY MOUTH DAILY 90 tablet 2    diltiazem ER COATED BEADS (CARDIZEM CD/CARTIA XT) 120 MG 24 hr capsule TAKE 1 CAPSULE(120 MG) BY MOUTH DAILY 90 capsule 2    Eucerin external lotion Use liberally to dry skin BID and prn. 480 mL 3    furosemide (LASIX) 20 MG tablet TAKE 1 TABLET(20 MG) BY MOUTH TWICE DAILY 180 tablet 3    gabapentin (NEURONTIN) 600 MG tablet TAKE 1 " TABLET(600 MG) BY MOUTH THREE TIMES DAILY 270 tablet 2    generic lancets (FINGERSTIX LANCETS) [GENERIC LANCETS (FINGERSTIX LANCETS)] Dispense brand per patient's insurance at pharmacy discretion. 300 each 0    ipratropium - albuterol 0.5 mg/2.5 mg/3 mL (DUONEB) 0.5-2.5 (3) MG/3ML neb solution Take 1 vial (3 mLs) by nebulization every 6 hours as needed for shortness of breath or wheezing 90 mL 0    JARDIANCE 10 MG TABS tablet TAKE 1 TABLET(10 MG) BY MOUTH DAILY 90 tablet 2    magnesium oxide (MAG-OX) 400 MG tablet Take 1 tablet (400 mg) by mouth daily 30 tablet 0    meclizine (ANTIVERT) 25 MG tablet TAKE 1 TABLET(25 MG) BY MOUTH THREE TIMES DAILY AS NEEDED FOR DIZZINESS OR NAUSEA 45 tablet 5    metoprolol tartrate (LOPRESSOR) 25 MG tablet TAKE 1 TABLET(25 MG) BY MOUTH TWICE DAILY 180 tablet 2    Nutritional Supplements (ENSURE COMPLETE) LIQD Take 1 Can by mouth daily 7110 mL 4    nystatin (NYSTOP) 346566 UNIT/GM external powder APPLY TOPICALLY TO THE AFFECTED AREA 2-3 TIMES DAILY AS NEEDED 60 g 3    omeprazole (PRILOSEC) 20 MG DR capsule Take 20 mg by mouth daily      oxyCODONE IR (ROXICODONE) 10 MG tablet Take 1 tablet (10 mg) by mouth every 6 hours as needed for moderate to severe pain 120 tablet 0    potassium chloride ER (KLOR-CON M) 20 MEQ CR tablet TAKE 1 TABLET(20 MEQ) BY MOUTH DAILY 90 tablet 3    sucralfate (CARAFATE) 1 GM tablet TAKE 1 TABLET BY MOUTH FOUR TIMES DAILY. CRUSH TABLET AND MIX IT WITH A LITTLE WATER THEN SWALLOW 360 tablet 3    SYMBICORT 160-4.5 MCG/ACT Inhaler INHALE 2 PUFFS INTO THE LUNGS TWICE DAILY 10.2 g 4    diaper,brief,adult,disposable (ADULT BRIEFS - LARGE) Misc [DIAPER,BRIEF,ADULT,DISPOSABLE (ADULT BRIEFS - LARGE) MISC] Use 3-4 daily as needed for incontinence 120 each 6    naloxone (NARCAN) 4 MG/0.1ML nasal spray Spray 1 spray (4 mg) into one nostril alternating nostrils once as needed for opioid reversal every 2-3 minutes until assistance arrives (Patient not taking: Reported on  "12/26/2023) 0.2 mL 0       Allergies   Allergen Reactions    Celebrex [Celecoxib] Rash     patient had butterfly rash - \"lupus-like\"      Latex Rash        Social History     Tobacco Use    Smoking status: Some Days     Packs/day: .25     Types: Cigarettes     Passive exposure: Never    Smokeless tobacco: Never    Tobacco comments:     seen by [facility] inpatient on 3/31/22   Substance Use Topics    Alcohol use: Yes     Comment: Alcoholic Drinks/day: very little     Family History   Problem Relation Age of Onset    Heart Failure Mother     Cancer Other         paternal HX-laryngeal     Alcoholism Sister     No Known Problems Daughter     No Known Problems Maternal Grandmother     No Known Problems Maternal Grandfather     No Known Problems Paternal Grandmother     No Known Problems Paternal Grandfather     No Known Problems Maternal Aunt     No Known Problems Paternal Aunt     Alcoholism Sister     Alcoholism Brother     Alcoholism Father     Cancer Paternal Uncle         Gastric-Alcohol    Cancer Paternal Uncle         gastric-Alcohol    Hereditary Breast and Ovarian Cancer Syndrome No family hx of     Breast Cancer No family hx of     Colon Cancer No family hx of     Endometrial Cancer No family hx of     Ovarian Cancer No family hx of      History   Drug Use No         Objective     /64 (BP Location: Right arm, Patient Position: Sitting, Cuff Size: Adult Small)   Pulse 62   Temp 96.9  F (36.1  C) (Temporal)   LMP  (LMP Unknown)     Physical Exam  Vitals reviewed.   Constitutional:       General: She is not in acute distress.     Appearance: She is ill-appearing (chronically ill appearing).   HENT:      Head: Normocephalic.      Right Ear: Tympanic membrane, ear canal and external ear normal.      Left Ear: Tympanic membrane, ear canal and external ear normal.      Nose: Nose normal.      Mouth/Throat:      Mouth: Mucous membranes are moist.      Pharynx: No posterior oropharyngeal erythema.   Eyes:      " Extraocular Movements: Extraocular movements intact.      Conjunctiva/sclera: Conjunctivae normal.      Pupils: Pupils are equal, round, and reactive to light.   Cardiovascular:      Rate and Rhythm: Normal rate. Rhythm irregular.      Pulses: Normal pulses.      Heart sounds: Murmur heard.   Pulmonary:      Effort: Pulmonary effort is normal.      Breath sounds: Normal breath sounds.   Abdominal:      Palpations: Abdomen is soft. There is no mass.      Tenderness: There is no abdominal tenderness. There is no guarding or rebound.   Musculoskeletal:         General: Tenderness (tenderness in muscles - with trigger points) present. No deformity. Normal range of motion.      Cervical back: Normal range of motion and neck supple.   Lymphadenopathy:      Cervical: No cervical adenopathy.   Skin:     General: Skin is warm and dry.   Neurological:      General: No focal deficit present.      Mental Status: She is alert.      Motor: Weakness (generalized) present.   Psychiatric:         Behavior: Behavior normal.      Comments: Low mood             Recent Labs   Lab Test 09/20/23  1136 06/30/23  1352 05/25/23  1806 05/25/23  0507 02/09/23  0441 02/08/23  0759 01/19/23  0438 01/18/23  0522   HGB 14.3 12.9   < > 7.3*   < > 9.2*  --   --     206   < > 357   < > 432  --   --    INR  --   --   --   --   --  1.34*  --  1.35*    140   < > 136   < > 142   < > 139   POTASSIUM 5.1 4.6   < > 4.0   < > 4.6   < > 3.6   CR 0.73 0.71   < > 0.86   < > 0.70   < > 0.67   A1C 6.1*  --   --  6.1*  --   --   --   --     < > = values in this interval not displayed.        Diagnostics:  No results found for this or any previous visit (from the past 168 hour(s)).   Patient is insistent that her labs are to be done the morning of her intervention - there are lab orders in chart.      Revised Cardiac Risk Index (RCRI):  The patient has the following serious cardiovascular risks for perioperative complications:   - High risk surgery  (>5% cardiac complication risk) = 1 point   - Congestive Heart Failure (pulmonary edema, PND, s3 lobo, CXR with pulmonary congestion, basilar rales) = 1 point     RCRI Interpretation: 1 point: Class II (low risk - 0.9% complication rate)         Signed Electronically by: SAVANA SILVER MD  Copy of this evaluation report is provided to requesting physician.

## 2024-01-01 ASSESSMENT — ENCOUNTER SYMPTOMS
CHILLS: 0
WEAKNESS: 0
DYSURIA: 0
PALPITATIONS: 0
SHORTNESS OF BREATH: 1
ABDOMINAL PAIN: 0
FEVER: 0
ARTHRALGIAS: 1
UNEXPECTED WEIGHT CHANGE: 0
COUGH: 0
FREQUENCY: 0
MYALGIAS: 1
DIARRHEA: 0
PARESTHESIAS: 0
CONSTIPATION: 0
BRUISES/BLEEDS EASILY: 0
SORE THROAT: 0
NERVOUS/ANXIOUS: 1
ALLERGIC/IMMUNOLOGIC NEGATIVE: 1
DIZZINESS: 0
EYE PAIN: 0
HEADACHES: 0
FATIGUE: 1

## 2024-01-03 ENCOUNTER — TELEPHONE (OUTPATIENT)
Dept: CARDIOLOGY | Facility: CLINIC | Age: 74
End: 2024-01-03
Payer: COMMERCIAL

## 2024-01-03 NOTE — TELEPHONE ENCOUNTER
Pt called and LM to reschedule their coronary angiogram that is currently scheduled for 1/8/24.    Called the pt back to discuss as the pt has rescheduled the coronary angiogram a few times. Pt states she is getting over a viral infection and would like to recover from that before proceeding. Informed the pt they should try to move ahead with the rest of their valve workup as soon as they can. Pts aortic valve per last echo in may 2023 showed severe aortic stenosis. Informed pt aortic stenosis is a progressive disease. Pt states understanding. Informed pt Ammy can call to reschedule the angiogram to the next week or so. Pt had a pre-op on 12/26. Pt also needs CV surgery consult. Patient verbalizes understanding and agrees with plan. No further questions or concerns at this time.

## 2024-01-05 NOTE — TELEPHONE ENCOUNTER
From: Ammy Lord  Sent: 1/4/2024  10:06 AM CST  To: Jennifer Ospina, RN    Called patient to reschedule. Initially she wanted to reschedule to March.  I told her that the recommendations were to do it sooner than later since she has rescheduled so many times and the week of 1/15 would be the time to do the angiogram.  She agreed to at least do it in February.    She is all set for 2/8 admit at 730 with MY  She does have a PCP visit on 1/29.    ThanksAmmy

## 2024-01-15 DIAGNOSIS — E11.649 TYPE 2 DIABETES MELLITUS WITH HYPOGLYCEMIA WITHOUT COMA, WITHOUT LONG-TERM CURRENT USE OF INSULIN (H): ICD-10-CM

## 2024-01-15 RX ORDER — BLOOD SUGAR DIAGNOSTIC
STRIP MISCELLANEOUS
Qty: 300 STRIP | Refills: 0 | Status: SHIPPED | OUTPATIENT
Start: 2024-01-15 | End: 2024-04-15

## 2024-01-19 DIAGNOSIS — D50.0 IRON DEFICIENCY ANEMIA DUE TO CHRONIC BLOOD LOSS: ICD-10-CM

## 2024-01-19 RX ORDER — SUCRALFATE 1 G/1
TABLET ORAL
Qty: 360 TABLET | Refills: 3 | Status: SHIPPED | OUTPATIENT
Start: 2024-01-19

## 2024-01-29 ENCOUNTER — OFFICE VISIT (OUTPATIENT)
Dept: FAMILY MEDICINE | Facility: CLINIC | Age: 74
End: 2024-01-29
Payer: COMMERCIAL

## 2024-01-29 VITALS
RESPIRATION RATE: 18 BRPM | DIASTOLIC BLOOD PRESSURE: 62 MMHG | HEART RATE: 57 BPM | HEIGHT: 66 IN | OXYGEN SATURATION: 91 % | TEMPERATURE: 97.9 F | WEIGHT: 121 LBS | BODY MASS INDEX: 19.44 KG/M2 | SYSTOLIC BLOOD PRESSURE: 122 MMHG

## 2024-01-29 DIAGNOSIS — F11.20 CONTINUOUS OPIOID DEPENDENCE (H): ICD-10-CM

## 2024-01-29 DIAGNOSIS — E43 SEVERE PROTEIN-CALORIE MALNUTRITION (H): ICD-10-CM

## 2024-01-29 DIAGNOSIS — I50.33 ACUTE ON CHRONIC DIASTOLIC HEART FAILURE (H): ICD-10-CM

## 2024-01-29 DIAGNOSIS — G89.4 CHRONIC PAIN SYNDROME: ICD-10-CM

## 2024-01-29 DIAGNOSIS — Z01.818 PREOP EXAMINATION: Primary | ICD-10-CM

## 2024-01-29 DIAGNOSIS — E11.42 DIABETIC POLYNEUROPATHY ASSOCIATED WITH TYPE 2 DIABETES MELLITUS (H): ICD-10-CM

## 2024-01-29 DIAGNOSIS — I35.0 NONRHEUMATIC AORTIC VALVE STENOSIS: ICD-10-CM

## 2024-01-29 DIAGNOSIS — I48.11 LONGSTANDING PERSISTENT ATRIAL FIBRILLATION (H): ICD-10-CM

## 2024-01-29 DIAGNOSIS — J96.22 ACUTE AND CHRONIC RESPIRATORY FAILURE WITH HYPERCAPNIA (H): ICD-10-CM

## 2024-01-29 DIAGNOSIS — J43.9 PULMONARY EMPHYSEMA, UNSPECIFIED EMPHYSEMA TYPE (H): ICD-10-CM

## 2024-01-29 DIAGNOSIS — M79.7 FIBROMYALGIA: ICD-10-CM

## 2024-01-29 DIAGNOSIS — F33.1 MODERATE EPISODE OF RECURRENT MAJOR DEPRESSIVE DISORDER (H): ICD-10-CM

## 2024-01-29 PROCEDURE — 99214 OFFICE O/P EST MOD 30 MIN: CPT | Mod: 25 | Performed by: FAMILY MEDICINE

## 2024-01-29 PROCEDURE — 20553 NJX 1/MLT TRIGGER POINTS 3/>: CPT | Performed by: FAMILY MEDICINE

## 2024-01-29 RX ORDER — OXYCODONE HYDROCHLORIDE 10 MG/1
10 TABLET ORAL EVERY 6 HOURS PRN
Qty: 120 TABLET | Refills: 0 | Status: SHIPPED | OUTPATIENT
Start: 2024-01-29 | End: 2024-02-27

## 2024-01-29 ASSESSMENT — PATIENT HEALTH QUESTIONNAIRE - PHQ9
SUM OF ALL RESPONSES TO PHQ QUESTIONS 1-9: 3
10. IF YOU CHECKED OFF ANY PROBLEMS, HOW DIFFICULT HAVE THESE PROBLEMS MADE IT FOR YOU TO DO YOUR WORK, TAKE CARE OF THINGS AT HOME, OR GET ALONG WITH OTHER PEOPLE: NOT DIFFICULT AT ALL
SUM OF ALL RESPONSES TO PHQ QUESTIONS 1-9: 3

## 2024-01-29 NOTE — PROGRESS NOTES
Preoperative Evaluation  [facility] [address] [place] [address]  Phone: [phone]  Fax: [phone]  Primary Provider: Cammy Bui  Pre-op Performing Provider: CAMMY BUI  Jan 29, 2024       Mary Kay is a 74 year old, presenting for the following:  Trigger Point Injection and Pre-Op Exam        1/29/2024     4:42 PM   Additional Questions   Roomed by Mimi     Surgical Information  Surgery/Procedure: Angiogram  Surgery Location: [facility]  Surgeon: Dr. Valencia  Surgery Date: 2/8/24  Time of Surgery: 7am  Where patient plans to recover: At home with family  Fax number for surgical facility: Note does not need to be faxed, will be available electronically in Epic.    Assessment & Plan     The proposed surgical procedure is considered INTERMEDIATE risk.    Problem List Items Addressed This Visit       Moderate episode of recurrent major depressive disorder (H)    Chronic pain syndrome    Relevant Medications    oxyCODONE IR (ROXICODONE) 10 MG tablet    Paroxysmal Atrial Fibrillation    Fibromyalgia    Relevant Medications    oxyCODONE IR (ROXICODONE) 10 MG tablet    Nonrheumatic aortic valve stenosis    Diabetic polyneuropathy associated with type 2 diabetes mellitus (H)    Acute and chronic respiratory failure with hypercapnia (H)    Acute on chronic diastolic heart failure (H)    Pulmonary emphysema, unspecified emphysema type (H)    Protein-calorie malnutrition (H24)    F11.2 - Continuous opioid dependence (H)     Other Visit Diagnoses       Preop examination    -  Primary          This is a 73 yo female here for    Preop examination - prior to planned coronary cath lab procedure - patient is supposed to have coronary artery angios for evaluation of planned/possible aortic valve intervention.  Patient is not particularly excited to pursue any of this.    Trigger point injections  - she uses these for her fibromyalgia syndrome  - allows her to use less opioids (takes opiods for fibromyalgia/rheumatoid arthritis  - can't take NSAIDs due to h/o gastric ulcers with bleeding - last admissions 1-2 years ago)  [facility]    Procedure: MSK: Inject Trigger Points, >3    Date/Time: 1/29/2024 5:55 PM    Performed by: Cammy Bui MD  Authorized by: Cammy Bui MD      UNIVERSAL PROTOCOL   Site Marked: Yes  Prior Images Obtained and Reviewed:  NA  Required items: Required blood products, implants, devices and special equipment available    Patient identity confirmed:  Verbally with patient  NA - No sedation, light sedation, or local anesthesia  Confirmation Checklist:  Patient's identity using two indicators, relevant allergies, procedure was appropriate and matched the consent or emergent situation and correct equipment/implants were available  Time out: Immediately prior to the procedure a time out was called    Universal Protocol: the Joint Commission Universal Protocol was followed    Preparation: Patient was prepped and draped in usual sterile fashion    ESBL (mL):  0    SEDATION    Patient Sedated: No      PROCEDURE  Describe Procedure: 6 trigger points identified in posterior cervical/posterior scapula - each injection with 1.6 ml 1% Lidocaine (no epi) for total of 10 ml Lidocaine   Patient Tolerance:  Patient tolerated the procedure well with no immediate complications  Length of time physician/provider present for 1:1 monitoring during sedation: 0        Other conditions:  3.  Opioid dependence - chronic - no increasing usage  4.  Protein calorie malnutrition - improving since most recent hospitalization  5.  Depression - chronic mood disorder -   6.  Type II DM with polyneuropathy - has had stable/controlled A1c  7.  COPD - uses oxygen, mostly nocturnal - doesn't bring it with her; of note, she is still smoking as well  8.  CHF - stable - likely contribution from her aortic  valve disease -   9.  Atrial Fibrillation - stable pulse - on Eliquis    Possible Sleep Apnea: h/o sleep apnea         Risks and Recommendations  The patient has the following additional risks and recommendations for perioperative complications:   - No identified additional risk factors other than previously addressed    Antiplatelet or Anticoagulation Medication Instructions  On eliquis - patient reports she was told to continue this medication     Additional Medication Instructions   - Beta Blockers: Continue taking on the day of surgery.   - Calcium Channel Blockers: May be continued on the day of surgery.   - Diuretics: May continue due to heart failure.   - SGLT2 Inhibitor (canagliflozin, dapagliflozin, or empagliflozin): HOLD 3 days before surgery.    - rescue Inhaler: Continue PRN. Bring to hospital on the day of surgery.    Recommendation  APPROVAL GIVEN to proceed with proposed procedure, without further diagnostic evaluation.          Subjective       HPI related to upcoming procedure: this is a 72 yo female with multiple chronic medical issues.  She is known to have symptomatic aortic valve stenosis.  Would benefit from intervention.  Is scheduled for further assessment of the valve status.      Hasn't stopped smoking despite her cardiac/pulmonary issues.          1/29/2024     4:44 PM   Preop Questions   1. Have you ever had a heart attack or stroke? No   2. Have you ever had surgery on your heart or blood vessels, such as a stent placement, a coronary artery bypass, or surgery on an artery in your head, neck, heart, or legs? No   3. Do you have chest pain with activity? No   4. Do you have a history of  heart failure? No   5. Do you currently have a cold, bronchitis or symptoms of other infection? No   6. Do you have a cough, shortness of breath, or wheezing? No   7. Do you or anyone in your family have previous history of blood clots? No   8. Do you or does anyone in your family have a serious bleeding  problem such as prolonged bleeding following surgeries or cuts? No   9. Have you ever had problems with anemia or been told to take iron pills? No   10. Have you had any abnormal blood loss such as black, tarry or bloody stools, or abnormal vaginal bleeding? No   11. Have you ever had a blood transfusion? YES - bleeding gastric ulcer;    11a. Have you ever had a transfusion reaction? No   12. Are you willing to have a blood transfusion if it is medically needed before, during, or after your surgery? Yes   13. Have you or any of your relatives ever had problems with anesthesia? No   14. Do you have sleep apnea, excessive snoring or daytime drowsiness? YES - uses nocturnal oxygen   14a. Do you have a CPAP machine? No   15. Do you have any artifical heart valves or other implanted medical devices like a pacemaker, defibrillator, or continuous glucose monitor? No   16. Do you have artificial joints? YES - left knee   17. Are you allergic to latex? YES: rash       Health Care Directive  Patient does not have a Health Care Directive or Living Will: no written documents - sonMukul, would be decision maker     Preoperative Review of    reviewed - controlled substances reflected in medication list.      Status of Chronic Conditions:  See problem list for active medical problems.  Problems all longstanding and stable, except as noted/documented.  See ROS for pertinent symptoms related to these conditions.    Patient Active Problem List    Diagnosis Date Noted    Protein-calorie malnutrition (H24) 08/16/2023     Priority: Medium    F11.2 - Continuous opioid dependence (H) 08/16/2023     Priority: Medium    Pulmonary emphysema, unspecified emphysema type (H) 07/11/2023     Priority: Medium    Acute on chronic diastolic heart failure (H) 06/05/2023     Priority: Medium    Community acquired pneumonia 06/05/2023     Priority: Medium    Hypotension, unspecified hypotension type 06/04/2023     Priority: Medium    Pleural  effusion 06/04/2023     Priority: Medium    SOB (shortness of breath) 05/24/2023     Priority: Medium    Synovial cyst of knee, unspecified laterality 05/24/2023     Priority: Medium    Atrial flutter, unspecified type (H) 05/24/2023     Priority: Medium    Hypoxia 01/10/2023     Priority: Medium    Acute and chronic respiratory failure with hypercapnia (H) 03/27/2022     Priority: Medium    Nicotine dependence 03/26/2022     Priority: Medium     Formatting of this note might be different from the original.  Created by [facility] Annotation: Nov 13 2007  7:54AM - NiloCat rosales: average 1 ppd      COPD exacerbation (H) 03/26/2022     Priority: Medium    Acute on chronic respiratory failure with hypoxia (H) 03/26/2022     Priority: Medium    Pneumonia of right lower lobe due to infectious organism 03/26/2022     Priority: Medium    Dyspnea, unspecified type 03/26/2022     Priority: Medium    Atrial fibrillation, unspecified type (H) 09/27/2021     Priority: Medium    Chronic gastric ulcer without hemorrhage and without perforation 10/19/2020     Priority: Medium    Advanced directives, counseling/discussion 08/17/2020     Priority: Medium     Son, Mukul, is her decision maker; thinks she wrote a document years ago   (8/2020)        Primary osteoarthritis of both knees 01/17/2020     Priority: Medium    Mixed stress and urge urinary incontinence 08/16/2019     Priority: Medium    Skin lesion 07/17/2019     Priority: Medium     Left lower extemity - medial shin/ankle - small 1 mm with central   ulceration        Dyslipidemia      Priority: Medium    Upper GI bleed 12/13/2018     Priority: Medium    Melena 12/13/2018     Priority: Medium    Anemia due to blood loss, acute 07/18/2018     Priority: Medium    Diabetic polyneuropathy associated with type 2 diabetes mellitus (H) 07/18/2018     Priority: Medium    Chronic anemia 06/27/2018     Priority: Medium    Cataract 11/06/2017     Priority: Medium     Bunion, left 07/19/2017     Priority: Medium    Callus of foot 07/19/2017     Priority: Medium    Nonrheumatic aortic valve stenosis 05/23/2017     Priority: Medium    Gastroenteritis 12/24/2016     Priority: Medium    Atrial flutter (H)      Priority: Medium     Created by Conversion        Syncope 12/22/2016     Priority: Medium    Opacity of lung on imaging study 12/22/2016     Priority: Medium    Acute gastritis 12/22/2016     Priority: Medium    Type 2 diabetes mellitus with hypoglycemia (H)      Priority: Medium    Fibromyalgia      Priority: Medium     Created by Conversion  [facility] Annotation: Nov 13 2007  8:06Cammy Jean Baptiste:   11/07 sees Dr. Mascorro;        Mixed hyperlipidemia      Priority: Medium     Created by Conversion        Osteoarthritis of both knees 08/22/2016     Priority: Medium    Osteoarthritis Of The Shoulder      Priority: Medium     Created by Conversion  Replacement Utility updated for latest IMO load        Chronic Constipation      Priority: Medium     Created by Conversion  Replacement Utility updated for latest IMO load        Moderate episode of recurrent major depressive disorder (H)      Priority: Medium     Created by Conversion  Replacement Utility updated for latest IMO load        Dry Eye Syndrome      Priority: Medium     Created by Conversion  Replacement Utility updated for latest IMO load        Abdominal Pain      Priority: Medium     Created by Conversion  Replacement Utility updated for latest IMO load        Peptic Ulcer      Priority: Medium     Created by Conversion  [facility] Annotation: Nov 13 2007  8:09Cammy Jean Baptiste:   due   to NSAIDS  Replacement Utility updated for latest IMO load        Osteoarthritis, hip, bilateral 06/20/2016     Priority: Medium    Primary osteoarthritis of left knee 06/20/2016     Priority: Medium    Candidal intertrigo 05/11/2016     Priority: Medium    Aspiration pneumonia (H) 03/01/2016      Priority: Medium    Nicotine Dependence      Priority: Medium     Created by Conversion  [facility] Annotation: Nov 13 2007  7:54AM - Cat Meehan: average 1   ppd        Essential hypertension      Priority: Medium     Created by Conversion        Chronic pain syndrome      Priority: Medium     Cervical disc disease, rheumatoid arthritis (unable to take NSAIDs)  Controlled substance agreement signed/in chart 11/23/15        Controlled substance agreement signed 11/23/2015     Priority: Medium     Cervical disc disease, rheumatoid arthritis (unable to take NSAIDs)  Controlled substance agreement signed/in chart 11/23/15        Cervical neck pain with evidence of disc disease 07/22/2015     Priority: Medium    Headache 07/17/2015     Priority: Medium    Hematoma of abdominal wall 07/05/2015     Priority: Medium    Skin lesion of face 05/22/2015     Priority: Medium    Tendonitis of wrist, right 04/22/2015     Priority: Medium    Menopause 04/06/2015     Priority: Medium    Flashers or floaters of right eye 02/23/2015     Priority: Medium    Tendonitis of wrist, left 01/21/2015     Priority: Medium    Trigger point of thoracic region 01/21/2015     Priority: Medium    Generalized osteoarthrosis, involving multiple sites 01/14/2015     Priority: Medium    Vertigo 12/17/2014     Priority: Medium    Rotator cuff tendonitis 12/17/2014     Priority: Medium     Left sided        Paroxysmal Atrial Fibrillation      Priority: Medium     Created by Conversion        Chronic Reflux Esophagitis      Priority: Medium     Created by Conversion        Benign Adenomatous Polyp Of The Large Intestine      Priority: Medium     Created by Conversion  [facility] Annotation: Aug  1 2008  8:10PM - Cammy Bui:   followup colonoscopy 2006        Abnormal Weight Loss      Priority: Medium     Created by Conversion        Onychomycosis Of The Toenails      Priority: Medium     Created by Conversion        Diarrhea       "Priority: Medium     Created by Conversion        Ganglion Of The Left Wrist      Priority: Medium     Created by Conversion        Larynx Edema      Priority: Medium     Created by Conversion  [facility] Annotation: Jan 8 2008  9:19AM - Cammy Bui:   \"Geronimo's\" edema secondary to smoking;        Obesity      Priority: Medium     Created by Conversion        Medial Epicondylitis      Priority: Medium     Created by Conversion  [facility] Annotation: Feb 13 2012  9:11AM - Cammy Bui:   right        Skin Lesion      Priority: Medium     Created by Conversion        Urinary Incontinence      Priority: Medium     Created by Conversion        Hypokalemia      Priority: Medium     Created by Conversion        Muscle Cramps In The Calf      Priority: Medium     Created by Conversion        Edema      Priority: Medium     Created by Conversion        Lump In / On The Skin      Priority: Medium     Created by Conversion        Nausea      Priority: Medium     Created by Conversion          Past Medical History:   Diagnosis Date    Anemia     Aortic stenosis     Atrial fibrillation (H)     Atrial flutter (H)     Benign neoplasm of adenomatous polyp     large intestine     Chronic constipation     Chronic pain syndrome     COPD (chronic obstructive pulmonary disease) (H)     Oxygen at night     Dependence on supplemental oxygen     Oxygen at noc, during the day as needed    Depression     Diabetes mellitus (H)     Dry eye syndrome     Fibromyalgia     Ganglion     left wrist    GERD (gastroesophageal reflux disease)     Hyperlipidemia     Hypertension     Hypokalemia     Infective otitis externa, unspecified     Created by Conversion     Larynx edema     Lung disease     Malignant neoplasm of vulva (H)     Created by Conversion [facility] Annotation: Apr 17 2007  8:24AM - Cammy Bui:  resection per Dr. Alfonso Mane 9/06;  Replacement Utility updated for latest IMO load    " "Medial epicondylitis     Onychomycosis     Osteoarthritis     Peptic ulcer     Polyneuropathy     Vulvar malignant neoplasm (H)      Past Surgical History:   Procedure Laterality Date    BIOPSY BREAST Right     BIOPSY BREAST Right 01/28/2015    BIOPSY BREAST Right 1/28/2015    Procedure: RIGHT BREAST BIOPSY AFTER WIRE LOCALIZATION AT 0940;  Surgeon: Renée Soriano MD;  Location: [facility] [place];  Service:     BIOPSY OF BREAST, INCISIONAL      Description: Incisional Breast Biopsy;  Recorded: 11/13/2007;  Comments: benign    COLONOSCOPY N/A 6/14/2019    Procedure: COLONOSCOPY;  Surgeon: Eduardo Mora MD;  Location: [facility] [place];  Service: Gastroenterology    ESOPHAGOSCOPY, GASTROSCOPY, DUODENOSCOPY (EGD), COMBINED N/A 11/6/2018    Procedure: ESOPHAGOGASTRODUODENOSCOPY;  Surgeon: Lit Fernando MD;  Location: [facility] [place];  Service:     HYSTERECTOMY      JOINT REPLACEMENT Left     TKA    PICC TRIPLE LUMEN PLACEMENT  1/12/2023         CA ABLATE HEART DYSRHYTHM FOCUS      Description: Catheter Ablation Atrial Fibrillation;  Recorded: 07/31/2012;  Comments: 7/24/12 PVI with Dr. Gardiner and nilay to all 5 pulm veins and CTI fl ablation line as well.    ZZC SUPRACERV ABD HYSTERECTOMY      Description: Supracervical Hysterectomy;  Proc Date: 01/01/1985;  Comments: some cervix left!; ovaries intact; done for bleeding     Current Outpatient Medications   Medication Sig Dispense Refill    ACCU-CHEK SOFTCLIX LANCETS lancets [ACCU-CHEK SOFTCLIX LANCETS LANCETS] TEST THREE TIMES DAILY 300 each 3    albuterol (PROAIR HFA/PROVENTIL HFA/VENTOLIN HFA) 108 (90 Base) MCG/ACT inhaler INHALE 2 PUFFS INTO THE LUNGS EVERY 4 HOURS AS NEEDED FOR WHEEZING 13.4 g 3    apixaban ANTICOAGULANT (ELIQUIS) 5 MG tablet Take 1 tablet (5 mg) by mouth 2 times daily 180 tablet 2    atorvastatin (LIPITOR) 20 MG tablet TAKE 1 TABLET(20 MG) BY MOUTH AT BEDTIME 90 tablet 3    BD ULTRA-FINE SHORT PEN NEEDLE 31 gauge x 5/16\" Ndle [BD " "ULTRA-FINE SHORT PEN NEEDLE 31 GAUGE X 5/16\" NDLE] TEST FOUR TIMES DAILY WITH MEALS AND AT BEDTIME 400 each 3    blood-glucose meter (ONETOUCH VERIO IQ METER) Misc [BLOOD-GLUCOSE METER (ONETOUCH VERIO IQ METER) MISC] Check blood sugar three times a day. 1 each 0    diaper,brief,adult,disposable (ADULT BRIEFS - LARGE) Misc [DIAPER,BRIEF,ADULT,DISPOSABLE (ADULT BRIEFS - LARGE) MISC] Use 3-4 daily as needed for incontinence 120 each 6    digoxin (LANOXIN) 125 MCG tablet TAKE 1 TABLET(125 MCG) BY MOUTH DAILY 90 tablet 2    diltiazem ER COATED BEADS (CARDIZEM CD/CARTIA XT) 120 MG 24 hr capsule TAKE 1 CAPSULE(120 MG) BY MOUTH DAILY 90 capsule 2    Eucerin external lotion Use liberally to dry skin BID and prn. 480 mL 3    furosemide (LASIX) 20 MG tablet TAKE 1 TABLET(20 MG) BY MOUTH TWICE DAILY 180 tablet 3    gabapentin (NEURONTIN) 600 MG tablet TAKE 1 TABLET(600 MG) BY MOUTH THREE TIMES DAILY 270 tablet 2    generic lancets (FINGERSTIX LANCETS) [GENERIC LANCETS (FINGERSTIX LANCETS)] Dispense brand per patient's insurance at pharmacy discretion. 300 each 0    ipratropium - albuterol 0.5 mg/2.5 mg/3 mL (DUONEB) 0.5-2.5 (3) MG/3ML neb solution Take 1 vial (3 mLs) by nebulization every 6 hours as needed for shortness of breath or wheezing 90 mL 0    JARDIANCE 10 MG TABS tablet TAKE 1 TABLET(10 MG) BY MOUTH DAILY 90 tablet 2    magnesium oxide (MAG-OX) 400 MG tablet Take 1 tablet (400 mg) by mouth daily 30 tablet 0    meclizine (ANTIVERT) 25 MG tablet TAKE 1 TABLET(25 MG) BY MOUTH THREE TIMES DAILY AS NEEDED FOR DIZZINESS OR NAUSEA 45 tablet 5    metoprolol tartrate (LOPRESSOR) 25 MG tablet TAKE 1 TABLET(25 MG) BY MOUTH TWICE DAILY 180 tablet 2    Nutritional Supplements (ENSURE COMPLETE) LIQD Take 1 Can by mouth daily 7110 mL 4    nystatin (NYSTOP) 982224 UNIT/GM external powder APPLY TOPICALLY TO THE AFFECTED AREA 2-3 TIMES DAILY AS NEEDED 60 g 3    omeprazole (PRILOSEC) 20 MG DR capsule TAKE 1 CAPSULE(20 MG) BY MOUTH TWICE " "DAILY 180 capsule 1    ONETOUCH VERIO IQ test strip TEST THREE TIMES DAILY 300 strip 0    oxyCODONE IR (ROXICODONE) 10 MG tablet Take 1 tablet (10 mg) by mouth every 6 hours as needed for moderate to severe pain 120 tablet 0    potassium chloride ER (KLOR-CON M) 20 MEQ CR tablet TAKE 1 TABLET(20 MEQ) BY MOUTH DAILY 90 tablet 3    sucralfate (CARAFATE) 1 GM tablet CRUSH ONE TABLET AND MIX WITH A LLITTLE WATER AND SWALLOW FOUR TIMES DAILY 360 tablet 3    SYMBICORT 160-4.5 MCG/ACT Inhaler INHALE 2 PUFFS INTO THE LUNGS TWICE DAILY 10.2 g 4    naloxone (NARCAN) 4 MG/0.1ML nasal spray Spray 1 spray (4 mg) into one nostril alternating nostrils once as needed for opioid reversal every 2-3 minutes until assistance arrives (Patient not taking: Reported on 12/26/2023) 0.2 mL 0       Allergies   Allergen Reactions    Celebrex [Celecoxib] Rash     patient had butterfly rash - \"lupus-like\"      Latex Rash        Social History     Tobacco Use    Smoking status: Some Days     Packs/day: .25     Types: Cigarettes     Passive exposure: Never    Smokeless tobacco: Never    Tobacco comments:     seen by TTS inpatient on 3/31/22   Substance Use Topics    Alcohol use: Yes     Comment: Alcoholic Drinks/day: very little     Family History   Problem Relation Age of Onset    Heart Failure Mother     Cancer Other         paternal HX-laryngeal     Alcoholism Sister     No Known Problems Daughter     No Known Problems Maternal Grandmother     No Known Problems Maternal Grandfather     No Known Problems Paternal Grandmother     No Known Problems Paternal Grandfather     No Known Problems Maternal Aunt     No Known Problems Paternal Aunt     Alcoholism Sister     Alcoholism Brother     Alcoholism Father     Cancer Paternal Uncle         Gastric-Alcohol    Cancer Paternal Uncle         gastric-Alcohol    Hereditary Breast and Ovarian Cancer Syndrome No family hx of     Breast Cancer No family hx of     Colon Cancer No family hx of     Endometrial " "Cancer No family hx of     Ovarian Cancer No family hx of      History   Drug Use No         Review of Systems   Constitutional:  Positive for fatigue. Negative for appetite change, chills and fever.   HENT: Negative.     Eyes:  Negative for visual disturbance.   Respiratory:  Positive for cough and shortness of breath (chronic).    Cardiovascular:  Negative for chest pain and peripheral edema.   Endocrine: Negative.    Breasts:  negative.    Genitourinary: Negative.    Musculoskeletal:  Positive for arthralgias.   Skin: Negative.    Allergic/Immunologic: Negative.    Neurological: Negative.    Hematological:  Does not bruise/bleed easily.   Psychiatric/Behavioral: Negative.     All other systems reviewed and are negative.      Objective    /62 (BP Location: Right arm, Patient Position: Sitting, Cuff Size: Adult Small)   Pulse 57   Temp 97.9  F (36.6  C) (Temporal)   Resp 18   Ht 1.67 m (5' 5.75\")   Wt 54.9 kg (121 lb)   LMP  (LMP Unknown)   SpO2 91%   BMI 19.68 kg/m     Estimated body mass index is 19.68 kg/m  as calculated from the following:    Height as of this encounter: 1.67 m (5' 5.75\").    Weight as of this encounter: 54.9 kg (121 lb).  Physical Exam  Vitals reviewed.   Constitutional:       General: She is not in acute distress.     Appearance: Normal appearance. She is ill-appearing.   HENT:      Head: Normocephalic.      Right Ear: Tympanic membrane, ear canal and external ear normal.      Left Ear: Tympanic membrane, ear canal and external ear normal.      Nose: Nose normal.      Mouth/Throat:      Mouth: Mucous membranes are moist.      Pharynx: No posterior oropharyngeal erythema.   Eyes:      Extraocular Movements: Extraocular movements intact.      Conjunctiva/sclera: Conjunctivae normal.      Pupils: Pupils are equal, round, and reactive to light.   Cardiovascular:      Rate and Rhythm: Normal rate and regular rhythm.      Pulses: Normal pulses.      Heart sounds: Murmur heard. "   Pulmonary:      Effort: Pulmonary effort is normal.      Breath sounds: Normal breath sounds.   Abdominal:      Palpations: Abdomen is soft. There is no mass.      Tenderness: There is no abdominal tenderness. There is no guarding or rebound.   Musculoskeletal:         General: Tenderness (multiple trigger points - posterior paracervical, posterior scapular) present. No deformity. Normal range of motion.      Cervical back: Normal range of motion and neck supple.   Lymphadenopathy:      Cervical: No cervical adenopathy.   Skin:     General: Skin is warm and dry.   Neurological:      General: No focal deficit present.      Mental Status: She is alert.   Psychiatric:         Mood and Affect: Mood normal.         Behavior: Behavior normal.           Recent Labs   Lab Test 09/20/23  1136 06/30/23  1352 05/25/23  1806 05/25/23  0507 02/09/23  0441 02/08/23  0759 01/19/23  0438 01/18/23  0522   HGB 14.3 12.9   < > 7.3*   < > 9.2*  --   --     206   < > 357   < > 432  --   --    INR  --   --   --   --   --  1.34*  --  1.35*    140   < > 136   < > 142   < > 139   POTASSIUM 5.1 4.6   < > 4.0   < > 4.6   < > 3.6   CR 0.73 0.71   < > 0.86   < > 0.70   < > 0.67   A1C 6.1*  --   --  6.1*  --   --   --   --     < > = values in this interval not displayed.        Diagnostics  No results found for this or any previous visit (from the past 168 hour(s)).   No EKG this visit, completed in the last 90 days.    Revised Cardiac Risk Index (RCRI)  The patient has the following serious cardiovascular risks for perioperative complications:   - High risk surgery (>5% cardiac complication risk) = 1 point     RCRI Interpretation: 1 point: Class II (low risk - 0.9% complication rate)         Signed Electronically by: SAVANA SILVER MD  Copy of this evaluation report is provided to requesting physician.

## 2024-01-29 NOTE — PROGRESS NOTES
Prior to immunization administration, verified patients identity using patient s name and date of birth. Please see Immunization Activity for additional information.     Screening Questionnaire for Adult Immunization    Are you sick today?   No   Do you have allergies to medications, food, a vaccine component or latex?   Yes   Have you ever had a serious reaction after receiving a vaccination?   No   Do you have a long-term health problem with heart, lung, kidney, or metabolic disease (e.g., diabetes), asthma, a blood disorder, no spleen, complement component deficiency, a cochlear implant, or a spinal fluid leak?  Are you on long-term aspirin therapy?   Yes   Do you have cancer, leukemia, HIV/AIDS, or any other immune system problem?   No   Do you have a parent, brother, or sister with an immune system problem?   No   In the past 3 months, have you taken medications that affect  your immune system, such as prednisone, other steroids, or anticancer drugs; drugs for the treatment of rheumatoid arthritis, Crohn s disease, or psoriasis; or have you had radiation treatments?   Yes   Have you had a seizure, or a brain or other nervous system problem?   No   During the past year, have you received a transfusion of blood or blood    products, or been given immune (gamma) globulin or antiviral drug?   Yes   For women: Are you pregnant or is there a chance you could become       pregnant during the next month?   No   Have you received any vaccinations in the past 4 weeks?   No     Immunization questionnaire was positive for at least one answer.  Notified Dr. Brizuela      Answers submitted by the patient for this visit:  Patient Health Questionnaire (Submitted on 1/29/2024)  If you checked off any problems, how difficult have these problems made it for you to do your work, take care of things at home, or get along with other people?: Not difficult at all  PHQ9 TOTAL SCORE: 3  General Questionnaire (Submitted on  1/29/2024)  Chief Complaint: Chronic problems general questions HPI Form  What is the reason for your visit today? : Trigger Point Injection/Pre-Op

## 2024-02-04 ASSESSMENT — ENCOUNTER SYMPTOMS
FATIGUE: 1
ARTHRALGIAS: 1
APPETITE CHANGE: 0
BRUISES/BLEEDS EASILY: 0
COUGH: 1
CHILLS: 0
ENDOCRINE NEGATIVE: 1
SHORTNESS OF BREATH: 1
ALLERGIC/IMMUNOLOGIC NEGATIVE: 1
PSYCHIATRIC NEGATIVE: 1
NEUROLOGICAL NEGATIVE: 1
FEVER: 0

## 2024-02-06 ENCOUNTER — HOSPITAL ENCOUNTER (INPATIENT)
Facility: HOSPITAL | Age: 74
LOS: 4 days | Discharge: HOME | DRG: 286 | End: 2024-02-10
Attending: EMERGENCY MEDICINE | Admitting: INTERNAL MEDICINE
Payer: COMMERCIAL

## 2024-02-06 ENCOUNTER — APPOINTMENT (OUTPATIENT)
Dept: CT IMAGING | Facility: HOSPITAL | Age: 74
DRG: 286 | End: 2024-02-06
Attending: EMERGENCY MEDICINE
Payer: COMMERCIAL

## 2024-02-06 ENCOUNTER — NURSE TRIAGE (OUTPATIENT)
Dept: FAMILY MEDICINE | Facility: CLINIC | Age: 74
End: 2024-02-06

## 2024-02-06 ENCOUNTER — TELEPHONE (OUTPATIENT)
Dept: CARDIOLOGY | Facility: CLINIC | Age: 74
End: 2024-02-06

## 2024-02-06 DIAGNOSIS — N39.0 ACUTE UTI: ICD-10-CM

## 2024-02-06 DIAGNOSIS — I50.33 ACUTE ON CHRONIC DIASTOLIC HEART FAILURE (H): ICD-10-CM

## 2024-02-06 DIAGNOSIS — J44.1 COPD EXACERBATION (H): ICD-10-CM

## 2024-02-06 DIAGNOSIS — J90 PLEURAL EFFUSION: ICD-10-CM

## 2024-02-06 DIAGNOSIS — R09.02 HYPOXIA: ICD-10-CM

## 2024-02-06 DIAGNOSIS — R42 DIZZINESS: ICD-10-CM

## 2024-02-06 DIAGNOSIS — I35.0 AORTIC VALVE STENOSIS, ETIOLOGY OF CARDIAC VALVE DISEASE UNSPECIFIED: Primary | ICD-10-CM

## 2024-02-06 DIAGNOSIS — F17.200 NICOTINE DEPENDENCE: ICD-10-CM

## 2024-02-06 DIAGNOSIS — R10.84 GENERALIZED ABDOMINAL PAIN: ICD-10-CM

## 2024-02-06 LAB
ALBUMIN SERPL BCG-MCNC: 3.6 G/DL (ref 3.5–5.2)
ALBUMIN UR-MCNC: 10 MG/DL
ALP SERPL-CCNC: 88 U/L (ref 40–150)
ALT SERPL W P-5'-P-CCNC: 13 U/L (ref 0–50)
ANION GAP SERPL CALCULATED.3IONS-SCNC: 11 MMOL/L (ref 7–15)
APPEARANCE UR: ABNORMAL
AST SERPL W P-5'-P-CCNC: 20 U/L (ref 0–45)
BASOPHILS # BLD AUTO: 0 10E3/UL (ref 0–0.2)
BASOPHILS NFR BLD AUTO: 1 %
BILIRUB DIRECT SERPL-MCNC: 0.22 MG/DL (ref 0–0.3)
BILIRUB SERPL-MCNC: 0.6 MG/DL
BILIRUB UR QL STRIP: NEGATIVE
BUN SERPL-MCNC: 21.4 MG/DL (ref 8–23)
CALCIUM SERPL-MCNC: 9.2 MG/DL (ref 8.8–10.2)
CHLORIDE SERPL-SCNC: 99 MMOL/L (ref 98–107)
COLOR UR AUTO: YELLOW
CREAT SERPL-MCNC: 0.78 MG/DL (ref 0.51–0.95)
DEPRECATED HCO3 PLAS-SCNC: 30 MMOL/L (ref 22–29)
EGFRCR SERPLBLD CKD-EPI 2021: 79 ML/MIN/1.73M2
EOSINOPHIL # BLD AUTO: 0.1 10E3/UL (ref 0–0.7)
EOSINOPHIL NFR BLD AUTO: 2 %
ERYTHROCYTE [DISTWIDTH] IN BLOOD BY AUTOMATED COUNT: 14 % (ref 10–15)
FLUAV RNA SPEC QL NAA+PROBE: NEGATIVE
FLUBV RNA RESP QL NAA+PROBE: NEGATIVE
GLUCOSE SERPL-MCNC: 74 MG/DL (ref 70–99)
GLUCOSE UR STRIP-MCNC: >1000 MG/DL
HCT VFR BLD AUTO: 43.9 % (ref 35–47)
HGB BLD-MCNC: 14 G/DL (ref 11.7–15.7)
HGB UR QL STRIP: NEGATIVE
HOLD SPECIMEN: NORMAL
HOLD SPECIMEN: NORMAL
IMM GRANULOCYTES # BLD: 0 10E3/UL
IMM GRANULOCYTES NFR BLD: 0 %
KETONES UR STRIP-MCNC: NEGATIVE MG/DL
LACTATE SERPL-SCNC: 1.7 MMOL/L (ref 0.7–2)
LEUKOCYTE ESTERASE UR QL STRIP: ABNORMAL
LIPASE SERPL-CCNC: 12 U/L (ref 13–60)
LYMPHOCYTES # BLD AUTO: 1.2 10E3/UL (ref 0.8–5.3)
LYMPHOCYTES NFR BLD AUTO: 23 %
MAGNESIUM SERPL-MCNC: 2.1 MG/DL (ref 1.7–2.3)
MCH RBC QN AUTO: 30.1 PG (ref 26.5–33)
MCHC RBC AUTO-ENTMCNC: 31.9 G/DL (ref 31.5–36.5)
MCV RBC AUTO: 94 FL (ref 78–100)
MONOCYTES # BLD AUTO: 0.8 10E3/UL (ref 0–1.3)
MONOCYTES NFR BLD AUTO: 15 %
MUCOUS THREADS #/AREA URNS LPF: PRESENT /LPF
NEUTROPHILS # BLD AUTO: 3.1 10E3/UL (ref 1.6–8.3)
NEUTROPHILS NFR BLD AUTO: 59 %
NITRATE UR QL: NEGATIVE
NRBC # BLD AUTO: 0 10E3/UL
NRBC BLD AUTO-RTO: 0 /100
NT-PROBNP SERPL-MCNC: 534 PG/ML (ref 0–900)
PH UR STRIP: 5.5 [PH] (ref 5–7)
PLATELET # BLD AUTO: 151 10E3/UL (ref 150–450)
POTASSIUM SERPL-SCNC: 4 MMOL/L (ref 3.4–5.3)
PROT SERPL-MCNC: 7.3 G/DL (ref 6.4–8.3)
RBC # BLD AUTO: 4.65 10E6/UL (ref 3.8–5.2)
RBC URINE: 1 /HPF
RSV RNA SPEC NAA+PROBE: NEGATIVE
SARS-COV-2 RNA RESP QL NAA+PROBE: NEGATIVE
SODIUM SERPL-SCNC: 140 MMOL/L (ref 135–145)
SP GR UR STRIP: 1.03 (ref 1–1.03)
SQUAMOUS EPITHELIAL: 2 /HPF
TROPONIN T SERPL HS-MCNC: 27 NG/L
TROPONIN T SERPL HS-MCNC: 28 NG/L
UROBILINOGEN UR STRIP-MCNC: <2 MG/DL
WBC # BLD AUTO: 5.2 10E3/UL (ref 4–11)
WBC URINE: 2 /HPF

## 2024-02-06 PROCEDURE — 210N000001 HC R&B IMCU HEART CARE

## 2024-02-06 PROCEDURE — 83690 ASSAY OF LIPASE: CPT | Performed by: EMERGENCY MEDICINE

## 2024-02-06 PROCEDURE — 96376 TX/PRO/DX INJ SAME DRUG ADON: CPT

## 2024-02-06 PROCEDURE — 84484 ASSAY OF TROPONIN QUANT: CPT | Performed by: EMERGENCY MEDICINE

## 2024-02-06 PROCEDURE — 83036 HEMOGLOBIN GLYCOSYLATED A1C: CPT | Performed by: INTERNAL MEDICINE

## 2024-02-06 PROCEDURE — 36415 COLL VENOUS BLD VENIPUNCTURE: CPT | Performed by: EMERGENCY MEDICINE

## 2024-02-06 PROCEDURE — 71275 CT ANGIOGRAPHY CHEST: CPT

## 2024-02-06 PROCEDURE — 99285 EMERGENCY DEPT VISIT HI MDM: CPT | Mod: 25

## 2024-02-06 PROCEDURE — 93005 ELECTROCARDIOGRAM TRACING: CPT | Performed by: EMERGENCY MEDICINE

## 2024-02-06 PROCEDURE — 83880 ASSAY OF NATRIURETIC PEPTIDE: CPT | Performed by: EMERGENCY MEDICINE

## 2024-02-06 PROCEDURE — 250N000011 HC RX IP 250 OP 636: Performed by: STUDENT IN AN ORGANIZED HEALTH CARE EDUCATION/TRAINING PROGRAM

## 2024-02-06 PROCEDURE — 250N000011 HC RX IP 250 OP 636: Performed by: EMERGENCY MEDICINE

## 2024-02-06 PROCEDURE — 94640 AIRWAY INHALATION TREATMENT: CPT

## 2024-02-06 PROCEDURE — 83735 ASSAY OF MAGNESIUM: CPT | Performed by: EMERGENCY MEDICINE

## 2024-02-06 PROCEDURE — 74177 CT ABD & PELVIS W/CONTRAST: CPT

## 2024-02-06 PROCEDURE — 99223 1ST HOSP IP/OBS HIGH 75: CPT | Performed by: INTERNAL MEDICINE

## 2024-02-06 PROCEDURE — 250N000009 HC RX 250: Performed by: EMERGENCY MEDICINE

## 2024-02-06 PROCEDURE — 70450 CT HEAD/BRAIN W/O DYE: CPT

## 2024-02-06 PROCEDURE — 87637 SARSCOV2&INF A&B&RSV AMP PRB: CPT | Performed by: EMERGENCY MEDICINE

## 2024-02-06 PROCEDURE — 81001 URINALYSIS AUTO W/SCOPE: CPT | Performed by: EMERGENCY MEDICINE

## 2024-02-06 PROCEDURE — 82248 BILIRUBIN DIRECT: CPT | Performed by: EMERGENCY MEDICINE

## 2024-02-06 PROCEDURE — 85025 COMPLETE CBC W/AUTO DIFF WBC: CPT | Performed by: EMERGENCY MEDICINE

## 2024-02-06 PROCEDURE — 87086 URINE CULTURE/COLONY COUNT: CPT | Performed by: EMERGENCY MEDICINE

## 2024-02-06 PROCEDURE — 80053 COMPREHEN METABOLIC PANEL: CPT | Performed by: EMERGENCY MEDICINE

## 2024-02-06 PROCEDURE — 96365 THER/PROPH/DIAG IV INF INIT: CPT

## 2024-02-06 PROCEDURE — 83605 ASSAY OF LACTIC ACID: CPT | Performed by: EMERGENCY MEDICINE

## 2024-02-06 PROCEDURE — 84145 PROCALCITONIN (PCT): CPT | Performed by: INTERNAL MEDICINE

## 2024-02-06 PROCEDURE — 96366 THER/PROPH/DIAG IV INF ADDON: CPT

## 2024-02-06 PROCEDURE — 96375 TX/PRO/DX INJ NEW DRUG ADDON: CPT

## 2024-02-06 RX ORDER — IPRATROPIUM BROMIDE AND ALBUTEROL SULFATE 2.5; .5 MG/3ML; MG/3ML
3 SOLUTION RESPIRATORY (INHALATION) ONCE
Status: COMPLETED | OUTPATIENT
Start: 2024-02-07 | End: 2024-02-07

## 2024-02-06 RX ORDER — ONDANSETRON 2 MG/ML
4 INJECTION INTRAMUSCULAR; INTRAVENOUS ONCE
Status: COMPLETED | OUTPATIENT
Start: 2024-02-06 | End: 2024-02-06

## 2024-02-06 RX ORDER — CEFTRIAXONE 2 G/1
2 INJECTION, POWDER, FOR SOLUTION INTRAMUSCULAR; INTRAVENOUS ONCE
Status: COMPLETED | OUTPATIENT
Start: 2024-02-07 | End: 2024-02-07

## 2024-02-06 RX ORDER — METHYLPREDNISOLONE SODIUM SUCCINATE 125 MG/2ML
125 INJECTION, POWDER, LYOPHILIZED, FOR SOLUTION INTRAMUSCULAR; INTRAVENOUS ONCE
Status: COMPLETED | OUTPATIENT
Start: 2024-02-07 | End: 2024-02-06

## 2024-02-06 RX ORDER — IOPAMIDOL 755 MG/ML
64 INJECTION, SOLUTION INTRAVASCULAR ONCE
Status: COMPLETED | OUTPATIENT
Start: 2024-02-06 | End: 2024-02-06

## 2024-02-06 RX ORDER — IPRATROPIUM BROMIDE AND ALBUTEROL SULFATE 2.5; .5 MG/3ML; MG/3ML
3 SOLUTION RESPIRATORY (INHALATION) ONCE
Status: COMPLETED | OUTPATIENT
Start: 2024-02-06 | End: 2024-02-06

## 2024-02-06 RX ADMIN — ONDANSETRON 4 MG: 2 INJECTION INTRAMUSCULAR; INTRAVENOUS at 19:54

## 2024-02-06 RX ADMIN — IOPAMIDOL 64 ML: 755 INJECTION, SOLUTION INTRAVENOUS at 22:44

## 2024-02-06 RX ADMIN — CEFTRIAXONE SODIUM 2 G: 2 INJECTION, POWDER, FOR SOLUTION INTRAMUSCULAR; INTRAVENOUS at 23:45

## 2024-02-06 RX ADMIN — ONDANSETRON 4 MG: 2 INJECTION INTRAMUSCULAR; INTRAVENOUS at 22:48

## 2024-02-06 RX ADMIN — METHYLPREDNISOLONE SODIUM SUCCINATE 125 MG: 125 INJECTION, POWDER, FOR SOLUTION INTRAMUSCULAR; INTRAVENOUS at 23:45

## 2024-02-06 RX ADMIN — IPRATROPIUM BROMIDE AND ALBUTEROL SULFATE 3 ML: .5; 3 SOLUTION RESPIRATORY (INHALATION) at 23:53

## 2024-02-06 ASSESSMENT — ENCOUNTER SYMPTOMS
NAUSEA: 1
VOMITING: 0
DYSURIA: 0
WEAKNESS: 0
SPEECH DIFFICULTY: 1
HEADACHES: 0
FEVER: 0
DIZZINESS: 1
DIARRHEA: 0
ABDOMINAL PAIN: 1
COUGH: 1
SHORTNESS OF BREATH: 1
CONSTIPATION: 1

## 2024-02-06 ASSESSMENT — ACTIVITIES OF DAILY LIVING (ADL)
ADLS_ACUITY_SCORE: 36
ADLS_ACUITY_SCORE: 38
ADLS_ACUITY_SCORE: 38

## 2024-02-06 NOTE — TELEPHONE ENCOUNTER
Nurse Triage SBAR    Is this a 2nd Level Triage? YES, LICENSED PRACTITIONER REVIEW IS REQUIRED    Situation: Patient called to report symptoms of productive cough with thin white, yellowish to brownish tinged mucus since Sunday, 2/4/24. Fell off the bed last night with no injury.  Son assisted patient back to bed.  Using oxygen continuously 2.5 L/pm.  No apparent distress on the line.  Able to communicate in a normal tone of voice.  Patient slight hard of hearing.     Background:  Has upcoming angiogram scheduled for 2/8/24. Pre-op with pcp on 1/29/24. Patient reported has post-pone similar procedure in the past due to health.     Assessment: Patient reported feeling warm but not sure if its fever as no thermometer.  Reported productive cough of white, yellowish to brownish tinged mucus since Sunday, 2/4/24.  Patient has hx of a smoker.      Protocol Recommended Disposition:   See in Office Today    Recommendation: Will route encounter to provider or advice as there is no in-person clinic appointment today.  Son at work easily reachable if needed to come to clinic to be seen.       Routed to provider    Does the patient meet one of the following criteria for ADS visit consideration? 16+ years old, with an [facility] PCP     TIP  Providers, please consider if this condition is appropriate for management at one of our Acute and Diagnostic Services sites.     If patient is a good candidate, please use dotphrase <dot>triageresponse and select Refer to ADS to document.    MING Zuniga, RN  [facility]    Reason for Disposition   Coughing up katherine-colored (reddish-brown) or blood-tinged sputum    Additional Information   Negative: Bluish (or gray) lips or face   Negative: SEVERE difficulty breathing (e.g., struggling for each breath, speaks in single words)   Negative: Rapid onset of cough and has hives   Negative: Coughing started suddenly after medicine, an allergic food or bee sting   Negative:  "Difficulty breathing after exposure to flames, smoke, or fumes   Negative: Sounds like a life-threatening emergency to the triager   Negative: Dry cough (non-productive; no sputum or minimal clear sputum) and within 14 days of COVID-19 Exposure   Negative: Previous asthma attacks and this feels like asthma attack   Negative: MODERATE difficulty breathing (e.g., speaks in phrases, SOB even at rest, pulse 100-120) and still present when not coughing   Negative: Chest pain present when not coughing   Negative: Passed out (i.e., fainted, collapsed and was not responding)   Negative: Patient sounds very sick or weak to the triager   Negative: MILD difficulty breathing (e.g., minimal/no SOB at rest, SOB with walking, pulse <100) and still present when not coughing   Negative: Coughed up > 1 tablespoon (15 ml) blood (Exception: Blood-tinged sputum.)   Negative: Fever > 103 F (39.4 C)   Negative: Fever > 101 F (38.3 C) and over 60 years of age   Negative: Fever > 100.0 F (37.8 C) and has diabetes mellitus or a weak immune system (e.g., HIV positive, cancer chemotherapy, organ transplant, splenectomy, chronic steroids)   Negative: Fever > 100.0 F (37.8 C) and bedridden (e.g., CVA, chronic illness, recovering from surgery)   Negative: Increasing ankle swelling   Negative: Wheezing is present   Negative: SEVERE coughing spells (e.g., whooping sound after coughing, vomiting after coughing)    Answer Assessment - Initial Assessment Questions  1. ONSET: \"When did the cough begin?\"       Sunday, 2/4  2. SEVERITY: \"How bad is the cough today?\"       Still coughing  3. SPUTUM: \"Describe the color of your sputum\" (none, dry cough; clear, white, yellow, green)      White to yellow with brownish tinged thick mucus.    4. HEMOPTYSIS: \"Are you coughing up any blood?\" If so ask: \"How much?\" (flecks, streaks, tablespoons, etc.)      no  5. DIFFICULTY BREATHING: \"Are you having difficulty breathing?\" If Yes, ask: \"How bad is it?\" (e.g., " "mild, moderate, severe)     - MILD: No SOB at rest, mild SOB with walking, speaks normally in sentences, can lie down, no retractions, pulse < 100.     - MODERATE: SOB at rest, SOB with minimal exertion and prefers to sit, cannot lie down flat, speaks in phrases, mild retractions, audible wheezing, pulse 100-120.     - SEVERE: Very SOB at rest, speaks in single words, struggling to breathe, sitting hunched forward, retractions, pulse > 120       Oxygen continuous 2.5 L/min and using rescue inhaler  6. FEVER: \"Do you have a fever?\" If Yes, ask: \"What is your temperature, how was it measured, and when did it start?\"      no  7. CARDIAC HISTORY: \"Do you have any history of heart disease?\" (e.g., heart attack, congestive heart failure)       Yes.  See chart  8. LUNG HISTORY: \"Do you have any history of lung disease?\"  (e.g., pulmonary embolus, asthma, emphysema)      COPD  9. PE RISK FACTORS: \"Do you have a history of blood clots?\" (or: recent major surgery, recent prolonged travel, bedridden)      uncertain  10. OTHER SYMPTOMS: \"Do you have any other symptoms?\" (e.g., runny nose, wheezing, chest pain)        Patient reported fell off the bed last night and the son had to pick her up back to bed.  Denied injury. Patient also reported feeling dizzy.  Feeling warm but unsure if this is fever. Patient home alone.  Son working.   11. PREGNANCY: \"Is there any chance you are pregnant?\" \"When was your last menstrual period?\"        N/a  12. TRAVEL: \"Have you traveled out of the country in the last month?\" (e.g., travel history, exposures)        nda    Protocols used: Cough-A-OH    "

## 2024-02-06 NOTE — TELEPHONE ENCOUNTER
Called pt to discuss education for their upcoming coronary angiogram. Pt states they are not feeling well. Pt states yesterday they were dizzy and pt slipped out of the bed. Pt states they have a cough as well. Pt states they are not short of breath and they were 2.5L O2 while in bed. Pt denies any other symptoms.     Pt states they called their PCP and are waiting for a call back to see if they should be seen. Pt just seen by PCP on 1/29.     Pt has cancelled and rescheduled their pre-TAVR coronary angiogram several times due to varies reasons including anxiety. Informed pt some of her symptoms could be related to her aortic stenosis, but if pt is feeling they need to be seen then they should seek evaluation.     For now, pt will hold their eliquis and jardiance in preporation for their scheduled coronary angiogram on 2/8. Pt will call the valve clinic once they know what they would like to do. Patient verbalizes understanding and agrees with plan. No further questions or concerns at this time.

## 2024-02-06 NOTE — ED TRIAGE NOTES
Pt arrives to triage for abdominal pain in the LUQ and RUQ. Pt reports she has been nauseated, constipated, and dizzy. Pt reports she is having a cardiac procedure on Thursday. She believes she is having a stent placed. Pt is hypoxic in triage at 80% on room air. Pt placed on 4L via nasal cannula and oxygen improved to 94%. Pt has hx of COPD.

## 2024-02-06 NOTE — TELEPHONE ENCOUNTER
Contacted patient and she is on her way to [facility] ER.  Patient has has upcoming angiogram scheduled for 2/8/24. Pre-op with pcp on 1/29/24    Message routed to PCP, Dr Brizuela per patient request.    Denise Maddox RN  [facility]

## 2024-02-07 ENCOUNTER — APPOINTMENT (OUTPATIENT)
Dept: ULTRASOUND IMAGING | Facility: HOSPITAL | Age: 74
DRG: 286 | End: 2024-02-07
Attending: HOSPITALIST
Payer: COMMERCIAL

## 2024-02-07 LAB
% LINING CELLS, BODY FLUID: 3 %
ABO/RH(D): NORMAL
ANION GAP SERPL CALCULATED.3IONS-SCNC: 13 MMOL/L (ref 7–15)
ANTIBODY SCREEN: NEGATIVE
APPEARANCE FLD: ABNORMAL
BASOPHILS NFR FLD MANUAL: 1 %
BUN SERPL-MCNC: 24.9 MG/DL (ref 8–23)
CALCIUM SERPL-MCNC: 9.2 MG/DL (ref 8.8–10.2)
CELL COUNT BODY FLUID SOURCE: ABNORMAL
CHLORIDE SERPL-SCNC: 96 MMOL/L (ref 98–107)
COLOR FLD: ABNORMAL
CREAT SERPL-MCNC: 0.78 MG/DL (ref 0.51–0.95)
DEPRECATED HCO3 PLAS-SCNC: 28 MMOL/L (ref 22–29)
EGFRCR SERPLBLD CKD-EPI 2021: 79 ML/MIN/1.73M2
EOSINOPHIL NFR FLD MANUAL: 2 %
ERYTHROCYTE [DISTWIDTH] IN BLOOD BY AUTOMATED COUNT: 13.6 % (ref 10–15)
GLUCOSE BLDC GLUCOMTR-MCNC: 165 MG/DL (ref 70–99)
GLUCOSE BLDC GLUCOMTR-MCNC: 170 MG/DL (ref 70–99)
GLUCOSE BLDC GLUCOMTR-MCNC: 174 MG/DL (ref 70–99)
GLUCOSE BLDC GLUCOMTR-MCNC: 181 MG/DL (ref 70–99)
GLUCOSE BLDC GLUCOMTR-MCNC: 182 MG/DL (ref 70–99)
GLUCOSE BLDC GLUCOMTR-MCNC: 78 MG/DL (ref 70–99)
GLUCOSE BODY FLUID SOURCE: NORMAL
GLUCOSE FLD-MCNC: 176 MG/DL
GLUCOSE SERPL-MCNC: 171 MG/DL (ref 70–99)
GRAM STAIN RESULT: NORMAL
HBA1C MFR BLD: 6.2 %
HCT VFR BLD AUTO: 44.4 % (ref 35–47)
HGB BLD-MCNC: 13.9 G/DL (ref 11.7–15.7)
HOLD SPECIMEN: NORMAL
LD BODY BODY FLUID SOURCE: NORMAL
LDH FLD L TO P-CCNC: 151 U/L
LDH SERPL L TO P-CCNC: 280 U/L (ref 0–250)
LYMPHOCYTES NFR FLD MANUAL: 65 %
MCH RBC QN AUTO: 30 PG (ref 26.5–33)
MCHC RBC AUTO-ENTMCNC: 31.3 G/DL (ref 31.5–36.5)
MCV RBC AUTO: 96 FL (ref 78–100)
MONOS+MACROS NFR FLD MANUAL: 18 %
NEUTS BAND NFR FLD MANUAL: 11 %
PLATELET # BLD AUTO: 154 10E3/UL (ref 150–450)
POTASSIUM SERPL-SCNC: 4.3 MMOL/L (ref 3.4–5.3)
PROCALCITONIN SERPL IA-MCNC: 0.06 NG/ML
PROT FLD-MCNC: 4.3 G/DL
PROT SERPL-MCNC: 7.7 G/DL (ref 6.4–8.3)
PROTEIN BODY FLUID SOURCE: NORMAL
RBC # BLD AUTO: 4.63 10E6/UL (ref 3.8–5.2)
SODIUM SERPL-SCNC: 137 MMOL/L (ref 135–145)
SPECIMEN EXPIRATION DATE: NORMAL
TROPONIN T SERPL HS-MCNC: 26 NG/L
WBC # BLD AUTO: 4 10E3/UL (ref 4–11)
WBC # FLD AUTO: 987 /UL

## 2024-02-07 PROCEDURE — 0W993ZZ DRAINAGE OF RIGHT PLEURAL CAVITY, PERCUTANEOUS APPROACH: ICD-10-PCS | Performed by: RADIOLOGY

## 2024-02-07 PROCEDURE — 85027 COMPLETE CBC AUTOMATED: CPT | Performed by: INTERNAL MEDICINE

## 2024-02-07 PROCEDURE — 94640 AIRWAY INHALATION TREATMENT: CPT | Mod: 76

## 2024-02-07 PROCEDURE — 99233 SBSQ HOSP IP/OBS HIGH 50: CPT | Performed by: HOSPITALIST

## 2024-02-07 PROCEDURE — 86900 BLOOD TYPING SEROLOGIC ABO: CPT | Performed by: INTERNAL MEDICINE

## 2024-02-07 PROCEDURE — 250N000012 HC RX MED GY IP 250 OP 636 PS 637: Performed by: INTERNAL MEDICINE

## 2024-02-07 PROCEDURE — 82962 GLUCOSE BLOOD TEST: CPT

## 2024-02-07 PROCEDURE — 250N000009 HC RX 250: Performed by: INTERNAL MEDICINE

## 2024-02-07 PROCEDURE — 250N000011 HC RX IP 250 OP 636: Performed by: INTERNAL MEDICINE

## 2024-02-07 PROCEDURE — 80048 BASIC METABOLIC PNL TOTAL CA: CPT | Performed by: INTERNAL MEDICINE

## 2024-02-07 PROCEDURE — 83615 LACTATE (LD) (LDH) ENZYME: CPT | Performed by: HOSPITALIST

## 2024-02-07 PROCEDURE — 87075 CULTR BACTERIA EXCEPT BLOOD: CPT | Performed by: HOSPITALIST

## 2024-02-07 PROCEDURE — 36415 COLL VENOUS BLD VENIPUNCTURE: CPT | Performed by: INTERNAL MEDICINE

## 2024-02-07 PROCEDURE — 88305 TISSUE EXAM BY PATHOLOGIST: CPT | Mod: TC | Performed by: HOSPITALIST

## 2024-02-07 PROCEDURE — 87205 SMEAR GRAM STAIN: CPT | Performed by: HOSPITALIST

## 2024-02-07 PROCEDURE — 99223 1ST HOSP IP/OBS HIGH 75: CPT | Performed by: INTERNAL MEDICINE

## 2024-02-07 PROCEDURE — 999N000157 HC STATISTIC RCP TIME EA 10 MIN

## 2024-02-07 PROCEDURE — 84155 ASSAY OF PROTEIN SERUM: CPT | Performed by: HOSPITALIST

## 2024-02-07 PROCEDURE — 84484 ASSAY OF TROPONIN QUANT: CPT | Performed by: INTERNAL MEDICINE

## 2024-02-07 PROCEDURE — 82945 GLUCOSE OTHER FLUID: CPT | Performed by: HOSPITALIST

## 2024-02-07 PROCEDURE — 999N000156 HC STATISTIC RCP CONSULT EA 30 MIN

## 2024-02-07 PROCEDURE — 84157 ASSAY OF PROTEIN OTHER: CPT | Performed by: HOSPITALIST

## 2024-02-07 PROCEDURE — 272N000042 US THORACENTESIS

## 2024-02-07 PROCEDURE — 87040 BLOOD CULTURE FOR BACTERIA: CPT | Performed by: INTERNAL MEDICINE

## 2024-02-07 PROCEDURE — 210N000001 HC R&B IMCU HEART CARE

## 2024-02-07 PROCEDURE — 250N000013 HC RX MED GY IP 250 OP 250 PS 637: Performed by: HOSPITALIST

## 2024-02-07 PROCEDURE — 89050 BODY FLUID CELL COUNT: CPT | Performed by: HOSPITALIST

## 2024-02-07 RX ORDER — NALOXONE HYDROCHLORIDE 0.4 MG/ML
0.2 INJECTION, SOLUTION INTRAMUSCULAR; INTRAVENOUS; SUBCUTANEOUS
Status: DISCONTINUED | OUTPATIENT
Start: 2024-02-07 | End: 2024-02-08

## 2024-02-07 RX ORDER — PANTOPRAZOLE SODIUM 40 MG/1
40 TABLET, DELAYED RELEASE ORAL
Status: DISCONTINUED | OUTPATIENT
Start: 2024-02-07 | End: 2024-02-10 | Stop reason: HOSPADM

## 2024-02-07 RX ORDER — CEFTRIAXONE 1 G/1
1 INJECTION, POWDER, FOR SOLUTION INTRAMUSCULAR; INTRAVENOUS EVERY 24 HOURS
Status: DISCONTINUED | OUTPATIENT
Start: 2024-02-07 | End: 2024-02-10

## 2024-02-07 RX ORDER — METHYLPREDNISOLONE SODIUM SUCCINATE 125 MG/2ML
60 INJECTION, POWDER, LYOPHILIZED, FOR SOLUTION INTRAMUSCULAR; INTRAVENOUS EVERY 8 HOURS
Status: DISCONTINUED | OUTPATIENT
Start: 2024-02-07 | End: 2024-02-07

## 2024-02-07 RX ORDER — NALOXONE HYDROCHLORIDE 0.4 MG/ML
0.4 INJECTION, SOLUTION INTRAMUSCULAR; INTRAVENOUS; SUBCUTANEOUS
Status: DISCONTINUED | OUTPATIENT
Start: 2024-02-07 | End: 2024-02-08

## 2024-02-07 RX ORDER — GABAPENTIN 300 MG/1
600 CAPSULE ORAL 3 TIMES DAILY
Status: DISCONTINUED | OUTPATIENT
Start: 2024-02-07 | End: 2024-02-10 | Stop reason: HOSPADM

## 2024-02-07 RX ORDER — PREDNISONE 20 MG/1
40 TABLET ORAL DAILY
Status: DISCONTINUED | OUTPATIENT
Start: 2024-02-08 | End: 2024-02-10 | Stop reason: HOSPADM

## 2024-02-07 RX ORDER — ASPIRIN 325 MG
325 TABLET ORAL ONCE
Qty: 1 TABLET | Refills: 0 | Status: COMPLETED | OUTPATIENT
Start: 2024-02-08 | End: 2024-02-08

## 2024-02-07 RX ORDER — ATORVASTATIN CALCIUM 10 MG/1
20 TABLET, FILM COATED ORAL AT BEDTIME
Status: DISCONTINUED | OUTPATIENT
Start: 2024-02-07 | End: 2024-02-08

## 2024-02-07 RX ORDER — AMOXICILLIN 250 MG
1 CAPSULE ORAL 2 TIMES DAILY PRN
Status: DISCONTINUED | OUTPATIENT
Start: 2024-02-07 | End: 2024-02-10 | Stop reason: HOSPADM

## 2024-02-07 RX ORDER — CALCIUM CARBONATE 500 MG/1
1000 TABLET, CHEWABLE ORAL 4 TIMES DAILY PRN
Status: DISCONTINUED | OUTPATIENT
Start: 2024-02-07 | End: 2024-02-10 | Stop reason: HOSPADM

## 2024-02-07 RX ORDER — LIDOCAINE 40 MG/G
CREAM TOPICAL
Status: DISCONTINUED | OUTPATIENT
Start: 2024-02-07 | End: 2024-02-10 | Stop reason: HOSPADM

## 2024-02-07 RX ORDER — FUROSEMIDE 10 MG/ML
60 INJECTION INTRAMUSCULAR; INTRAVENOUS ONCE
Status: COMPLETED | OUTPATIENT
Start: 2024-02-07 | End: 2024-02-07

## 2024-02-07 RX ORDER — OXYCODONE HYDROCHLORIDE 5 MG/1
5-10 TABLET ORAL EVERY 6 HOURS PRN
Status: DISCONTINUED | OUTPATIENT
Start: 2024-02-07 | End: 2024-02-08

## 2024-02-07 RX ORDER — NICOTINE POLACRILEX 4 MG
15-30 LOZENGE BUCCAL
Status: DISCONTINUED | OUTPATIENT
Start: 2024-02-07 | End: 2024-02-10 | Stop reason: HOSPADM

## 2024-02-07 RX ORDER — DIGOXIN 125 MCG
125 TABLET ORAL DAILY
Status: DISCONTINUED | OUTPATIENT
Start: 2024-02-07 | End: 2024-02-10 | Stop reason: HOSPADM

## 2024-02-07 RX ORDER — FLUTICASONE FUROATE AND VILANTEROL 200; 25 UG/1; UG/1
1 POWDER RESPIRATORY (INHALATION) DAILY
Status: DISCONTINUED | OUTPATIENT
Start: 2024-02-07 | End: 2024-02-10 | Stop reason: HOSPADM

## 2024-02-07 RX ORDER — PREDNISONE 20 MG/1
40 TABLET ORAL DAILY
Status: DISCONTINUED | OUTPATIENT
Start: 2024-02-07 | End: 2024-02-07

## 2024-02-07 RX ORDER — IPRATROPIUM BROMIDE AND ALBUTEROL SULFATE 2.5; .5 MG/3ML; MG/3ML
3 SOLUTION RESPIRATORY (INHALATION)
Status: DISCONTINUED | OUTPATIENT
Start: 2024-02-07 | End: 2024-02-08

## 2024-02-07 RX ORDER — SUCRALFATE 1 G/1
1 TABLET ORAL
Status: DISCONTINUED | OUTPATIENT
Start: 2024-02-07 | End: 2024-02-10 | Stop reason: HOSPADM

## 2024-02-07 RX ORDER — METOPROLOL TARTRATE 25 MG/1
25 TABLET, FILM COATED ORAL 2 TIMES DAILY
Status: DISCONTINUED | OUTPATIENT
Start: 2024-02-07 | End: 2024-02-10 | Stop reason: HOSPADM

## 2024-02-07 RX ORDER — IPRATROPIUM BROMIDE AND ALBUTEROL SULFATE 2.5; .5 MG/3ML; MG/3ML
3 SOLUTION RESPIRATORY (INHALATION) EVERY 6 HOURS PRN
Status: DISCONTINUED | OUTPATIENT
Start: 2024-02-07 | End: 2024-02-10 | Stop reason: HOSPADM

## 2024-02-07 RX ORDER — DEXTROSE MONOHYDRATE 25 G/50ML
25-50 INJECTION, SOLUTION INTRAVENOUS
Status: DISCONTINUED | OUTPATIENT
Start: 2024-02-07 | End: 2024-02-10 | Stop reason: HOSPADM

## 2024-02-07 RX ORDER — DILTIAZEM HYDROCHLORIDE 120 MG/1
120 CAPSULE, COATED, EXTENDED RELEASE ORAL DAILY
Status: DISCONTINUED | OUTPATIENT
Start: 2024-02-07 | End: 2024-02-07

## 2024-02-07 RX ORDER — DILTIAZEM HYDROCHLORIDE 120 MG/1
120 CAPSULE, COATED, EXTENDED RELEASE ORAL DAILY
Status: DISCONTINUED | OUTPATIENT
Start: 2024-02-08 | End: 2024-02-10 | Stop reason: HOSPADM

## 2024-02-07 RX ORDER — ALBUTEROL SULFATE 90 UG/1
2 AEROSOL, METERED RESPIRATORY (INHALATION) EVERY 4 HOURS PRN
Status: DISCONTINUED | OUTPATIENT
Start: 2024-02-07 | End: 2024-02-10 | Stop reason: HOSPADM

## 2024-02-07 RX ORDER — AMOXICILLIN 250 MG
2 CAPSULE ORAL 2 TIMES DAILY PRN
Status: DISCONTINUED | OUTPATIENT
Start: 2024-02-07 | End: 2024-02-10 | Stop reason: HOSPADM

## 2024-02-07 RX ADMIN — FLUTICASONE FUROATE AND VILANTEROL TRIFENATATE 1 PUFF: 200; 25 POWDER RESPIRATORY (INHALATION) at 12:20

## 2024-02-07 RX ADMIN — SUCRALFATE 1 G: 1 TABLET ORAL at 16:52

## 2024-02-07 RX ADMIN — ATORVASTATIN CALCIUM 20 MG: 10 TABLET, FILM COATED ORAL at 21:06

## 2024-02-07 RX ADMIN — INSULIN ASPART 4 UNITS: 100 INJECTION, SOLUTION INTRAVENOUS; SUBCUTANEOUS at 18:03

## 2024-02-07 RX ADMIN — GABAPENTIN 600 MG: 300 CAPSULE ORAL at 21:06

## 2024-02-07 RX ADMIN — GABAPENTIN 600 MG: 300 CAPSULE ORAL at 11:10

## 2024-02-07 RX ADMIN — IPRATROPIUM BROMIDE AND ALBUTEROL SULFATE 3 ML: .5; 3 SOLUTION RESPIRATORY (INHALATION) at 08:22

## 2024-02-07 RX ADMIN — GABAPENTIN 600 MG: 300 CAPSULE ORAL at 13:15

## 2024-02-07 RX ADMIN — SUCRALFATE 1 G: 1 TABLET ORAL at 21:06

## 2024-02-07 RX ADMIN — IPRATROPIUM BROMIDE AND ALBUTEROL SULFATE 3 ML: .5; 3 SOLUTION RESPIRATORY (INHALATION) at 20:12

## 2024-02-07 RX ADMIN — METHYLPREDNISOLONE SODIUM SUCCINATE 62.5 MG: 125 INJECTION, POWDER, FOR SOLUTION INTRAMUSCULAR; INTRAVENOUS at 06:53

## 2024-02-07 RX ADMIN — FUROSEMIDE 60 MG: 10 INJECTION, SOLUTION INTRAMUSCULAR; INTRAVENOUS at 12:20

## 2024-02-07 RX ADMIN — IPRATROPIUM BROMIDE AND ALBUTEROL SULFATE 3 ML: .5; 3 SOLUTION RESPIRATORY (INHALATION) at 03:51

## 2024-02-07 RX ADMIN — DIGOXIN 125 MCG: 125 TABLET ORAL at 12:21

## 2024-02-07 RX ADMIN — SUCRALFATE 1 G: 1 TABLET ORAL at 11:10

## 2024-02-07 RX ADMIN — METOPROLOL TARTRATE 25 MG: 25 TABLET, FILM COATED ORAL at 21:07

## 2024-02-07 RX ADMIN — CEFTRIAXONE SODIUM 1 G: 1 INJECTION, POWDER, FOR SOLUTION INTRAMUSCULAR; INTRAVENOUS at 21:09

## 2024-02-07 RX ADMIN — IPRATROPIUM BROMIDE AND ALBUTEROL SULFATE 3 ML: .5; 3 SOLUTION RESPIRATORY (INHALATION) at 11:54

## 2024-02-07 RX ADMIN — METOPROLOL TARTRATE 25 MG: 25 TABLET, FILM COATED ORAL at 11:10

## 2024-02-07 RX ADMIN — PANTOPRAZOLE SODIUM 40 MG: 40 TABLET, DELAYED RELEASE ORAL at 16:52

## 2024-02-07 RX ADMIN — INSULIN ASPART 2 UNITS: 100 INJECTION, SOLUTION INTRAVENOUS; SUBCUTANEOUS at 12:30

## 2024-02-07 ASSESSMENT — ACTIVITIES OF DAILY LIVING (ADL)
ADLS_ACUITY_SCORE: 44
DEPENDENT_IADLS:: CLEANING;COOKING;LAUNDRY;SHOPPING;MEAL PREPARATION;TRANSPORTATION;MEDICATION MANAGEMENT
ADLS_ACUITY_SCORE: 33
ADLS_ACUITY_SCORE: 38
ADLS_ACUITY_SCORE: 44
ADLS_ACUITY_SCORE: 30
ADLS_ACUITY_SCORE: 44
ADLS_ACUITY_SCORE: 44
ADLS_ACUITY_SCORE: 38
ADLS_ACUITY_SCORE: 44
ADLS_ACUITY_SCORE: 44
ADLS_ACUITY_SCORE: 46
ADLS_ACUITY_SCORE: 44

## 2024-02-07 NOTE — PHARMACY-ADMISSION MEDICATION HISTORY
Pharmacist Admission Medication History    Admission medication history is complete. The information provided in this note is only as accurate as the sources available at the time of the update.    Information Source(s): Patient, Clinic records, and CareEverywhere/SureScripts via N/A    Pertinent Information: Completed med history after hand-off given to me- did not personally interview the patient. Unable to get a hold of patient's PCA who helps set up medications. Patient's pharmacy fill history is consistent for the medications listed below.    Changes made to PTA medication list:  Added: None  Deleted: None  Changed: None    Allergies reviewed with patient and updates made in EHR: no    Medication History Completed By: Nessa Parish [facility] 2/7/2024 5:19 PM    Prior to Admission medications    Medication Sig Last Dose Taking? Auth Provider Long Term End Date   albuterol (PROAIR HFA/PROVENTIL HFA/VENTOLIN HFA) 108 (90 Base) MCG/ACT inhaler INHALE 2 PUFFS INTO THE LUNGS EVERY 4 HOURS AS NEEDED FOR WHEEZING Unknown at prn Yes Cammy Bui MD Yes    apixaban ANTICOAGULANT (ELIQUIS) 5 MG tablet Take 1 tablet (5 mg) by mouth 2 times daily Unknown at am Yes Cammy Bui MD     atorvastatin (LIPITOR) 20 MG tablet TAKE 1 TABLET(20 MG) BY MOUTH AT BEDTIME 2/6/2024 Yes Cammy Bui MD Yes    digoxin (LANOXIN) 125 MCG tablet TAKE 1 TABLET(125 MCG) BY MOUTH DAILY 2/6/2024 Yes Cammy Bui MD Yes    diltiazem ER COATED BEADS (CARDIZEM CD/CARTIA XT) 120 MG 24 hr capsule TAKE 1 CAPSULE(120 MG) BY MOUTH DAILY 2/6/2024 Yes Cammy Bui MD Yes    furosemide (LASIX) 20 MG tablet TAKE 1 TABLET(20 MG) BY MOUTH TWICE DAILY Unknown Yes Cammy Bui MD Yes    gabapentin (NEURONTIN) 600 MG tablet TAKE 1 TABLET(600 MG) BY MOUTH THREE TIMES DAILY 2/6/2024 at am Yes Cammy Bui MD Yes    ipratropium - albuterol  0.5 mg/2.5 mg/3 mL (DUONEB) 0.5-2.5 (3) MG/3ML neb solution Take 1 vial (3 mLs) by nebulization every 6 hours as needed for shortness of breath or wheezing Unknown at prn Yes Cammy Bui MD Yes    JARDIANCE 10 MG TABS tablet TAKE 1 TABLET(10 MG) BY MOUTH DAILY 2/6/2024 at am Yes Cammy Bui MD     magnesium oxide (MAG-OX) 400 MG tablet Take 1 tablet (400 mg) by mouth daily 2/6/2024 at am Yes Angela Kaiser MD     meclizine (ANTIVERT) 25 MG tablet TAKE 1 TABLET(25 MG) BY MOUTH THREE TIMES DAILY AS NEEDED FOR DIZZINESS OR NAUSEA Unknown at prn Yes Cammy Bui MD     metoprolol tartrate (LOPRESSOR) 25 MG tablet TAKE 1 TABLET(25 MG) BY MOUTH TWICE DAILY 2/6/2024 Yes Cammy Bui MD Yes    naloxone (NARCAN) 4 MG/0.1ML nasal spray Spray 1 spray (4 mg) into one nostril alternating nostrils once as needed for opioid reversal every 2-3 minutes until assistance arrives Unknown at prn Yes Cammy Bui MD Yes    nystatin (NYSTOP) 973904 UNIT/GM external powder APPLY TOPICALLY TO THE AFFECTED AREA 2-3 TIMES DAILY AS NEEDED Unknown at prn Yes Cammy Bui MD     omeprazole (PRILOSEC) 20 MG DR capsule TAKE 1 CAPSULE(20 MG) BY MOUTH TWICE DAILY 2/6/2024 Yes Cammy Bui MD     oxyCODONE IR (ROXICODONE) 10 MG tablet Take 1 tablet (10 mg) by mouth every 6 hours as needed for moderate to severe pain Unknown at prn Yes Cammy Bui MD     potassium chloride ER (KLOR-CON M) 20 MEQ CR tablet TAKE 1 TABLET(20 MEQ) BY MOUTH DAILY 2/6/2024 at am Yes Cammy Bui MD     sucralfate (CARAFATE) 1 GM tablet CRUSH ONE TABLET AND MIX WITH A LLITTLE WATER AND SWALLOW FOUR TIMES DAILY 2/6/2024 at am Yes Cammy Bui MD     SYMBICORT 160-4.5 MCG/ACT Inhaler INHALE 2 PUFFS INTO THE LUNGS TWICE DAILY 2/6/2024 at am Yes Cammy Bui MD Yes    [facility]  "SOFTCLIX LANCETS lancets [ACCU-CHEK SOFTCLIX LANCETS LANCETS] TEST THREE TIMES DAILY   Cammy Bui MD     BD ULTRA-FINE SHORT PEN NEEDLE 31 gauge x 5/16\" Ndle [BD ULTRA-FINE SHORT PEN NEEDLE 31 GAUGE X 5/16\" NDLE] TEST FOUR TIMES DAILY WITH MEALS AND AT BEDTIME   Cammy Bui MD     blood-glucose meter (ONETOUCH VERIO IQ METER) Misc [BLOOD-GLUCOSE METER (ONETOUCH VERIO IQ METER) MISC] Check blood sugar three times a day.   Cammy Bui MD     diaper,brief,adult,disposable (ADULT BRIEFS - LARGE) Misc [DIAPER,BRIEF,ADULT,DISPOSABLE (ADULT BRIEFS - LARGE) MISC] Use 3-4 daily as needed for incontinence   Cammy Bui MD     Eucerin external lotion Use liberally to dry skin BID and prn.   Cammy Bui MD     generic lancets (FINGERSTIX LANCETS) [GENERIC LANCETS (FINGERSTIX LANCETS)] Dispense brand per patient's insurance at pharmacy discretion.   Cammy Bui MD     Nutritional Supplements (ENSURE COMPLETE) LIQD Take 1 Can by mouth daily   Cammy Bui MD Yes    ONETOUCH VERIO IQ test strip TEST THREE TIMES DAILY   Cammy Bui MD         "

## 2024-02-07 NOTE — PLAN OF CARE
Problem: Comorbidity Management  Goal: Maintenance of COPD Symptom Control  Outcome: Progressing     Problem: Comorbidity Management  Goal: Blood Pressure in Desired Range  Outcome: Progressing     Problem: Comorbidity Management  Goal: Blood Pressure in Desired Range  Outcome: Progressing   Goal Outcome Evaluation:       Pt A&O X 4. Denies pain, pt overnight dests requiring additional O2 up to 6L oxymask. Switched back to 4L, sats in low 90s.  BG stable @ 0200 181.

## 2024-02-07 NOTE — PROGRESS NOTES
[facility] Hospitalist Progress Note  [facility]  Admission date: 2/6/2024    Summary:    74F with hx of COPD on O2 at home(2.5 liters),active smoker, hx of severe aortic stenosis (in process of TAVR work-up but delayed due to anxiety) hx of CAD, Afib/Flutter, who comes in with dizziness, sob, and lower abd pain in the setting of some dietary indiscretion.    CONTE is likely multifactorial from COPD exacerbation (more purulent sputum in the last few weeks), along with large R pleural effusion in the setting of severe aortic stenosis.  Having orthostatic symptoms and syncope likely from severe aortic stenosis as well.       Assessment/Plan    #COPD exacerbation with chronic hypoxic respiratory failure - no wheezing today.    -Ceftriaxone and check sputum culture.    -Transition to prednisone.    -Bronchodilators.    #Recurrent R pleural effusion - exudative in past.  Likely diuresed HF effusion.  Agree would be hard to diurese this off with orthostatic symptoms.  -diagnostic and therapeutic thora     #Severe aortic stenosis - needs to complete TAVR work-up.  Has been delayed due to anxiety.     #tobacco abuse -1 ppd. declined patch, not interested in stopping     #hx of Afib/flutter - toprol, dig, dilt (held for now as BP soft), eliquis (hold in case needs cards procedure)    #hx of cad     #Deconditioning  -having difficult time getting around due to CONTE, suspect copd advance, and valve issues  -high risk falls--had fall at home recently  -fall precautions     #DM  -was told to hold her po meds in anticipation for TAVR  -here cover with insulin carb count and scale     #Chronic pain, fibromyalgia  -on oxy     #Hx of mood d/o       Checklist:  Code Status: Full Code    Diet: Combination Diet Moderate Consistent Carb (60 g CHO per Meal) Diet; 2 gm NA Diet  NPO for Medical/Clinical Reasons Except for: Meds, Ice Chips     DVT px:  DOAC    Disposition and Discharge Planning  Auto-populated from discharge  "tab:     Expected Discharge Date: 02/10/2024                 System Identified Risk Variables  Auto-populated based on system request - if needs to be addressed in treatment plan they will be addressed above:  \"  Clinically Significant Risk Factors Present on Admission               # Drug Induced Coagulation Defect: home medication list includes an anticoagulant medication    # Hypertension: Noted on problem list   # Acute Respiratory Failure: Documented O2 saturation < 91%.  Continue supplemental oxygen as needed                \"    Interval Events/Subjective/Notable results:    SOB improved today  Orthostatic symptoms and syncope x 1 in last weeks  CONTE and productive cough last few weeks.    Reviewed: CT, BMP, prior thora results.  Discussed with: Dr. Valencia    Objective    Vital signs in last 24 hours  Temp:  [98  F (36.7  C)-98.6  F (37  C)] 98  F (36.7  C)  Pulse:  [55-81] 81  Resp:  [16-26] 23  BP: ()/(47-63) 97/47  SpO2:  [78 %-98 %] 92 % O2 Device: Oxymask    Weight:   130 lbs 0 oz  Body mass index is 21.63 kg/m .    Intake/Output last 3 shifts  No intake/output data recorded.    Physical Exam  General:  Alert, cooperative, no distress, chronically ill appearing    Neurologic:  oriented, facial symmetry preserved, fluent speech.   Psych: calm  HEENT:  Anicteric, MMM  CV: RRR, no edema  Lungs:  no wheezing.  Decreased at R base.  Easyrespirations  Abd: softly distended, NT  Skin: no rashes noted on exposed skin.    Chairez Catheter: Not present  Lines: None          Medical Decision Making               Edgar Wood MD, [facility]  Internal Medicine Hospitalist    "

## 2024-02-07 NOTE — CONSULTS
HEART CARE NOTE        Thank you, Dr. Wood, for asking the [facility] team to see Mary Kay Tejada to evaluate severe aortic stenosis.      Assessment/Recommendations     1.  Severe aortic stenosis  Assessment / Plan  Patient presents with symptoms likely part of the sequelae of progressive aortic stenosis; she endorses orthopnea - will give IV lasix  bolus x1 and monitor for response    Patient is high risk of adverse cardiac events 2/2 advanced age, severe valvular disease, CAD, DM2    2. Afib/flutter  Assessment / Plan  Continue digoxin, metoprolol, apixaban    3. CAD  Assessment / Plan  Denies chest pain or anginal equivalents   Coronary angiogram scheduled - will proceed tomorrow    4. HLP  Assessment / Plan  Resume atorvastatin    5. COPD  Assessment / Plan  Management and supportive care per primary team    6. DM2  Assessment / Plan  Management per primary team  Currently on ISS    Clinically Significant Risk Factors Present on Admission               # Drug Induced Coagulation Defect: home medication list includes an anticoagulant medication    # Hypertension: Noted on problem list   # Acute Respiratory Failure: Documented O2 saturation < 91%.  Continue supplemental oxygen as needed               Cardiac Arrhythmia: Atrial fibrillation: Unspecified  Cardiomyopathy  Non-Rheumatic Valve Disease: Aortic valve stenosis    Fluid overload, unspecified    85 minutes spent reviewing prior records (including documentation, laboratory studies, cardiac testing/imaging), history and physical exam, planning, and subsequent documentation.    History of Present Illness/Subjective    Ms. Mary Kay Tejada is a 74 year old female with a PMHx significant for (per Epic notation) COPD on O2 at home(2.5 liters),active smoker, hx of severe aortic stenosis(was to have TAVR this Thursday, hx of CAD, Afib/Flutter, who comes in with dizziness, sob, and lower abd pain concerning for cystitis     Today, Mrs. Tejada  "endorses symptoms of progressive dyspnea, orthopnea and dizziness which prompted her ED presentation; Management plan as detailed above    ECG: Personally reviewed. nonspecific ST and T waves changes, sinus bradycardia, left axis deviation.    ECHO (personnaly Reviewed on 2/7/24):   Compared to the prior study dated 1/11/23, there are changes as noted. Aortic  stenosis is now severe.  The left ventricle is normal in size. Left ventricular function is normal.The  ejection fraction is 60-65%.  The right ventricle is mildly dilated.  The left atrium is mildly dilated.  Severe valvular aortic stenosis. There is mild to moderate (1-2+) aortic  regurgitation.  Small pericardial effusion    Telemetry: personally reviewed February 7, 2024; notable for sinus rhythm     Medical history and pertinent documents reviewed in Care Everywhere please where applicable see details above          Physical Examination Review of Systems   BP 97/47 (BP Location: Left arm)   Pulse 81   Temp 98.2  F (36.8  C) (Oral)   Resp 23   Ht 1.651 m (5' 5\")   Wt 59 kg (130 lb)   LMP  (LMP Unknown)   SpO2 92%   BMI 21.63 kg/m    Body mass index is 21.63 kg/m .  Wt Readings from Last 3 Encounters:   02/06/24 59 kg (130 lb)   01/29/24 54.9 kg (121 lb)   09/20/23 54.9 kg (121 lb)     General Appearance:   no distress, normal body habitus   ENT/Mouth: membranes moist, no oral lesions or bleeding gums.      EYES:  no scleral icterus, normal conjunctivae   Neck: no carotid bruits or thyromegaly   Chest/Lungs:   lungs are clear to auscultation, no rales or wheezing, equal chest wall expansion    Cardiovascular:   Regular. Normal first and second heart sounds with +FITO; no rubs, or gallops; the carotid, radial and posterior tibial pulses are intact, + JVD, trace LE edema bilaterally    Abdomen:  no organomegaly, masses, bruits, or tenderness; bowel sounds are present   Extremities: no cyanosis or clubbing   Skin: no xanthelasma, warm.    Neurologic: " NAD     Psychiatric: alert and oriented x3, calm     A complete 10 systems ROS was reviewed  And is negative except what is listed in the HPI.          Medical History  Surgical History Family History Social History   Past Medical History:   Diagnosis Date    Anemia     Aortic stenosis     Atrial fibrillation (H)     Atrial flutter (H)     Benign neoplasm of adenomatous polyp     large intestine     Chronic constipation     Chronic pain syndrome     COPD (chronic obstructive pulmonary disease) (H)     Oxygen at night     Dependence on supplemental oxygen     Oxygen at noc, during the day as needed    Depression     Diabetes mellitus (H)     Dry eye syndrome     Fibromyalgia     Ganglion     left wrist    GERD (gastroesophageal reflux disease)     Hyperlipidemia     Hypertension     Hypokalemia     Infective otitis externa, unspecified     Created by Conversion     Larynx edema     Lung disease     Malignant neoplasm of vulva (H)     Created by Conversion [facility] Annotation: Apr 17 2007  8:24AM - Cammy Bui:  resection per Dr. Alfonso Mane 9/06;  Replacement Utility updated for latest IMO load    Medial epicondylitis     Onychomycosis     Osteoarthritis     Peptic ulcer     Polyneuropathy     Vulvar malignant neoplasm (H)     Past Surgical History:   Procedure Laterality Date    BIOPSY BREAST Right     BIOPSY BREAST Right 01/28/2015    BIOPSY BREAST Right 1/28/2015    Procedure: RIGHT BREAST BIOPSY AFTER WIRE LOCALIZATION AT 0940;  Surgeon: Renée Soriano MD;  Location: [facility];  Service:     BIOPSY OF BREAST, INCISIONAL      Description: Incisional Breast Biopsy;  Recorded: 11/13/2007;  Comments: benign    COLONOSCOPY N/A 6/14/2019    Procedure: COLONOSCOPY;  Surgeon: Eduardo Mora MD;  Location: [facility];  Service: Gastroenterology    ESOPHAGOSCOPY, GASTROSCOPY, DUODENOSCOPY (EGD), COMBINED N/A 11/6/2018    Procedure: ESOPHAGOGASTRODUODENOSCOPY;  Surgeon: Lit SUNG  MD Kassie;  Location: [facility] OR;  Service:     HYSTERECTOMY      JOINT REPLACEMENT Left     TKA    PICC TRIPLE LUMEN PLACEMENT  1/12/2023         AL ABLATE HEART DYSRHYTHM FOCUS      Description: Catheter Ablation Atrial Fibrillation;  Recorded: 07/31/2012;  Comments: 7/24/12 PVI with Dr. Gardiner and nilay to all 5 pulm veins and CTI fl ablation line as well.    ZZC SUPRACERV ABD HYSTERECTOMY      Description: Supracervical Hysterectomy;  Proc Date: 01/01/1985;  Comments: some cervix left!; ovaries intact; done for bleeding    no family history of premature coronary artery disease Social History     Socioeconomic History    Marital status:      Spouse name: Not on file    Number of children: Not on file    Years of education: Not on file    Highest education level: Not on file   Occupational History    Not on file   Tobacco Use    Smoking status: Some Days     Packs/day: .25     Types: Cigarettes     Passive exposure: Never    Smokeless tobacco: Never    Tobacco comments:     seen by TTS inpatient on 3/31/22   Vaping Use    Vaping Use: Never used   Substance and Sexual Activity    Alcohol use: Yes     Comment: Alcoholic Drinks/day: very little    Drug use: No    Sexual activity: Not on file   Other Topics Concern    Not on file   Social History Narrative    Not on file     Social Determinants of Health     Financial Resource Strain: Low Risk  (12/26/2023)    Financial Resource Strain     Within the past 12 months, have you or your family members you live with been unable to get utilities (heat, electricity) when it was really needed?: No   Food Insecurity: Low Risk  (12/26/2023)    Food Insecurity     Within the past 12 months, did you worry that your food would run out before you got money to buy more?: No     Within the past 12 months, did the food you bought just not last and you didn t have money to get more?: No   Transportation Needs: Low Risk  (12/26/2023)    Transportation Needs     Within  "the past 12 months, has lack of transportation kept you from medical appointments, getting your medicines, non-medical meetings or appointments, work, or from getting things that you need?: No   Physical Activity: Not on file   Stress: Not on file   Social Connections: Not on file   Interpersonal Safety: Low Risk  (9/20/2023)    Interpersonal Safety     Do you feel physically and emotionally safe where you currently live?: Yes     Within the past 12 months, have you been hit, slapped, kicked or otherwise physically hurt by someone?: No     Within the past 12 months, have you been humiliated or emotionally abused in other ways by your partner or ex-partner?: No   Housing Stability: Low Risk  (12/26/2023)    Housing Stability     Do you have housing? : Yes     Are you worried about losing your housing?: No           Lab Results    Chemistry/lipid CBC Cardiac Enzymes/BNP/TSH/INR   Lab Results   Component Value Date    CHOL 167 09/20/2023    HDL 75 09/20/2023    TRIG 83 09/20/2023    BUN 21.4 02/06/2024     02/06/2024    CO2 30 (H) 02/06/2024    Lab Results   Component Value Date    WBC 5.2 02/06/2024    HGB 14.0 02/06/2024    HCT 43.9 02/06/2024    MCV 94 02/06/2024     02/06/2024    Lab Results   Component Value Date    TROPONINI 0.07 05/24/2023     (H) 06/02/2023    TSH 1.17 05/24/2023    INR 1.34 (H) 02/08/2023     Lab Results   Component Value Date    TROPONINI 0.07 05/24/2023          Weight:    Wt Readings from Last 3 Encounters:   02/06/24 59 kg (130 lb)   01/29/24 54.9 kg (121 lb)   09/20/23 54.9 kg (121 lb)       Allergies  Allergies   Allergen Reactions    Celebrex [Celecoxib] Rash     patient had butterfly rash - \"lupus-like\"      Latex Rash         Surgical History  Past Surgical History:   Procedure Laterality Date    BIOPSY BREAST Right     BIOPSY BREAST Right 01/28/2015    BIOPSY BREAST Right 1/28/2015    Procedure: RIGHT BREAST BIOPSY AFTER WIRE LOCALIZATION AT 0940;  Surgeon: Renée" SHERRY Soriano MD;  Location: [facility];  Service:     BIOPSY OF BREAST, INCISIONAL      Description: Incisional Breast Biopsy;  Recorded: 11/13/2007;  Comments: benign    COLONOSCOPY N/A 6/14/2019    Procedure: COLONOSCOPY;  Surgeon: Edurado Mora MD;  Location: [facility];  Service: Gastroenterology    ESOPHAGOSCOPY, GASTROSCOPY, DUODENOSCOPY (EGD), COMBINED N/A 11/6/2018    Procedure: ESOPHAGOGASTRODUODENOSCOPY;  Surgeon: Lit Fernando MD;  Location: [facility];  Service:     HYSTERECTOMY      JOINT REPLACEMENT Left     TKA    PICC TRIPLE LUMEN PLACEMENT  1/12/2023         SD ABLATE HEART DYSRHYTHM FOCUS      Description: Catheter Ablation Atrial Fibrillation;  Recorded: 07/31/2012;  Comments: 7/24/12 PVI with Dr. Gardiner and nilay to all 5 pulm veins and CTI fl ablation line as well.    ZZC SUPRACERV ABD HYSTERECTOMY      Description: Supracervical Hysterectomy;  Proc Date: 01/01/1985;  Comments: some cervix left!; ovaries intact; done for bleeding       Social History  Tobacco:   History   Smoking Status    Some Days    Packs/day: 0.25    Types: Cigarettes   Smokeless Tobacco    Never    Alcohol:   Social History    Substance and Sexual Activity      Alcohol use: Yes        Comment: Alcoholic Drinks/day: very little   Illicit Drugs:   History   Drug Use No       Family History  Family History   Problem Relation Age of Onset    Heart Failure Mother     Cancer Other         paternal HX-laryngeal     Alcoholism Sister     No Known Problems Daughter     No Known Problems Maternal Grandmother     No Known Problems Maternal Grandfather     No Known Problems Paternal Grandmother     No Known Problems Paternal Grandfather     No Known Problems Maternal Aunt     No Known Problems Paternal Aunt     Alcoholism Sister     Alcoholism Brother     Alcoholism Father     Cancer Paternal Uncle         Gastric-Alcohol    Cancer Paternal Uncle         gastric-Alcohol    Hereditary Breast and Ovarian  Cancer Syndrome No family hx of     Breast Cancer No family hx of     Colon Cancer No family hx of     Endometrial Cancer No family hx of     Ovarian Cancer No family hx of           Thom Robert MD on 2/7/2024      cc: Cammy Bui

## 2024-02-07 NOTE — TREATMENT PLAN
RCAT Treatment Plan    Patient Score: 11  Patient Acuity: 3    Clinical Indication for Therapy: COPD with Oxygen dependence    Therapy Ordered: Duoneb QID    Assessment Summary: Pt admitted for abdominal pain, nausea, and dizziness. Pt has a hx of COPD, oxygen dependence 2.5L at home, currently on 4L NC. BS are diminished with no change post tx and pt reports no change in breathing. Pt has a strong productive cough, intermittent yellow thick secretions. CT shows large pleural effusion.     Armida Cheung, RT  2/7/2024

## 2024-02-07 NOTE — CONSULTS
"Care Management Initial Consult    General Information  Assessment completed with: PatientMary Kay  Type of CM/SW Visit: Initial Assessment    Primary Care Provider verified and updated as needed: Yes   Readmission within the last 30 days: no previous admission in last 30 days      Reason for Consult: discharge planning  Advance Care Planning: Advance Care Planning Reviewed: no concerns identified          Communication Assessment  Patient's communication style: spoken language (English or Bilingual)             Cognitive  Cognitive/Neuro/Behavioral: WDL                      Living Environment:   People in home: child(david), adult  Mary Kay and son Heriberto and Heriberto' girlfriend Cynthia  Current living Arrangements: house      Able to return to prior arrangements: yes       Family/Social Support:  Care provided by: self, other (see comments), child(david) (PCA)  Provides care for: no one, unable/limited ability to care for self     PCA, Children          Description of Support System: Supportive, Involved    Support Assessment: Adequate family and caregiver support, Adequate social supports, Patient communicates needs well met    Current Resources:   Patient receiving home care services: No     Community Resources: County Worker, [employer], PCA, Housekeeping/Chore Agency, DME (\"son's girlfriend Cynthia who lives with us is my PCA for 15.5 hr/week from [facility]. [facility] for home oxygen 2.5LPM\".)  Equipment currently used at home: walker, rolling, cane, straight, glucometer, grab bar, tub/shower, shower chair (\"I mostly use my 4WW, but I also have a cane if needed\".)  Supplies currently used at home: Oxygen Tubing/Supplies, Compression Stockings, Nebulizer tubing, Diabetic Supplies (\"[facility] for home oxygen 2.5LPM\".)    Employment/Financial:  Employment Status: retired     Employment/ Comments: \"no  history\"  Financial Concerns:     Referral to " "Financial Worker: No       Does the patient's insurance plan have a 3 day qualifying hospital stay waiver?  Yes     Which insurance plan 3 day waiver is available? Alternative insurance waiver    Will the waiver be used for post-acute placement? Undetermined at this time      Functional Status:  Prior to admission patient needed assistance:   Dependent ADLs:: Ambulation-walker, Bathing (\"my PCA would help me with dressing and grooming if I need the help\")  Dependent IADLs:: Cleaning, Cooking, Laundry, Shopping, Meal Preparation, Transportation, Medication Management       Mental Health Status:          Chemical Dependency Status:                Values/Beliefs:  Spiritual, Cultural Beliefs, Baptism Practices, Values that affect care:                 Additional Information:  Mary Kay lives in a house with her son Heriberto and Heriberto' girlfriend Cynthia.    She has a PCA for help with some ADLs and most IADLs. \"My PCA is my son's girlfriend Cynthia who lives with us is my PCA for 15.5 hr/week from [facility]. Cynthia my PCA would help me with dressing and grooming if I need the help and help me with whatever I needed even above the weekly hours scheduled.\"    \"I mostly use my 4WW, but I also have a cane if needed\". [facility] for home oxygen 2.5LPM.     Unknown discharge needs at this time.     Family to transport at discharge.    CM to follow for medical progression of care, discharge recommendations, and final discharge plan.    Lauryn Amador RN    "

## 2024-02-07 NOTE — ED PROVIDER NOTES
EMERGENCY DEPARTMENT ENCOUNTER      NAME: Mary Kay Tejada  AGE: 74 year old female  YOB: 1950  MRN: [id number]  EVALUATION DATE & TIME: 2/6/2024  6:48 PM    PCP: Cammy Bui    ED PROVIDER: Sanam Sneed M.D.        Chief Complaint   Patient presents with    Abdominal Pain         FINAL IMPRESSION:    1. Dizziness    2. Hypoxia    3. Generalized abdominal pain            MEDICAL DECISION MAKING:    Mary Kay Tejada is a 74 year old female with history of COPD with oxygen dependence, hypertension, upper GI bleed, diabetic polyneuropathy, who presents to the ER with complaints of diffuse abdominal pain, constipation, nausea, dizziness.  She had an episode yesterday of some short-lived slurred speech (36 hours ago).  Patient also reports sometimes having a yellow productive sputum cough without fever.     Imaging shows questionable aneurysm on the brain.  MRI has been recommended by radiologist for further evaluation.  I do not feel this needs to be done emergently at this moment as she is currently asymptomatic.  This can be done in the hospital as a hospitalist feels appropriate.    CT imaging of the chest shows a large pleural effusion though this appears to be stable.  No signs for PE.  Abdomen shows lots of chronic changes but findings to suggest possible acute UTI.  Will treat with ceftriaxone.  Patient now requiring 6 L nasal cannula oxygen which is abnormal for her.  Normally requires 2.5 L.  Ultimately plan at this time is admission to the hospital for optimization of her medical conditions in preparation for cardiac intervention on Thursday and possibly upcoming valve replacement.  Patient aware of this plan and agrees.    Patient admitted to the hospital.      ED COURSE:  7:34 PM  I met with the patient to gather history and perform my exam. ED course and treatment discussed.    11:15 PM  She is now requiring 6 L oxygen by nasal cannula to keep her sat 89% and  higher.  Head CT without acute findings but questionable aneurysm.  Talk to patient about MRI imaging but at this time she is little hesitant.  At bedside is concerned about her being discharged home.  Spoke with patient about admission to the hospital but she is fluctuating on whether or not she wants to do this.  But at this time is to get the rest of her results including CT chest and abdomen and then make the final decision.    11:31 PM  CT scan shows a pleural effusion.  Also concern for possible cystitis on imaging.  Plan this time is to treat with a dose of ceftriaxone, give her a DuoNeb, and Solu-Medrol.  Spoke with patient and she is now in agreement for admission to the hospital.  Patient denies any current vertigo or dizziness.  She does agree with the plan for admission.  Patient signed out to my partner at change of shift awaiting discussion with the hospitalist for admission.  Rest of patient's workup is complete.  Her dizziness has been going on now for 36+ hours and therefore not a intervention candidate even if this did represent a stroke as a component of her dizziness.  Does not appear septic or toxic.    I do not think that this represents ACS, PE, ruptured AAA, aortic dissection, bowel obstruction, bowel ischemia, cholecystitis, pancreatitis, appendicitis, diverticulitis, kidney stone, pyelonephritis, incarcerated or strangulated hernia, ovarian torsion, viscus perforation, perforated GI ulcer, allergic reaction, COPD exacerbation, asthma exacerbation, pneumonia, CHF, myocarditis, pericarditis, endocarditis, pneumothorax, or other such etiologies at this time.      At the conclusion of the encounter I discussed the results of all of the tests and the disposition. Their questions were answered. The patient (and any family present) acknowledged understanding and were agreeable with the care plan.      CONSULTANTS:  none      MEDICATIONS GIVEN IN THE EMERGENCY:  Medications   ipratropium -  "albuterol 0.5 mg/2.5 mg/3 mL (DUONEB) neb solution 3 mL (has no administration in time range)   ondansetron (ZOFRAN) injection 4 mg (4 mg Intravenous $Given 2/6/24 1954)   ondansetron (ZOFRAN) injection 4 mg (4 mg Intravenous $Given 2/6/24 2248)   iopamidol (ISOVUE-370) solution 64 mL (64 mLs Intravenous $Given 2/6/24 2244)         CONDITION:  stable        DISPOSITION:  Admit          =================================================================  =================================================================  TRIAGE ASSESSMENT:  Pt arrives to triage for abdominal pain in the LUQ and RUQ. Pt reports she has been nauseated, constipated, and dizzy. Pt reports she is having a cardiac procedure on Thursday. She believes she is having a stent placed. Pt is hypoxic in triage at 80% on room air. Pt placed on 4L via nasal cannula and oxygen improved to 94%. Pt has hx of COPD.       ED Triage Vitals [02/06/24 1750]   Enc Vitals Group      /60      Pulse 60      Resp 24      Temp 98.6  F (37  C)      Temp src Temporal      SpO2 (!) 80 %      Weight 59 kg (130 lb)      Height 1.651 m (5' 5\")       ================================================================  ================================================================    HPI    Patient information was obtained from: patient and family    Use of Intrepreter: N/A      Mary Kaydilma Tejada is a 74 year old female with history of COPD with oxygen dependence, hypertension, upper GI bleed, diabetic polyneuropathy, who presents to the ER with complaints of diffuse abdominal pain, constipation, nausea, dizziness.  She had an episode yesterday of some short-lived slurred speech (36 hours ago).  Patient also reports sometimes having a yellow productive sputum cough without fever.    She is supposed to have evaluation for possible valve replacement in a few days and she is worried that it may have to get rescheduled again if she is getting sick.    Patient is normally " on 2.5 L nasal cannula oxygen.  She first presented to triage she arrived on room air with a sat of 80%.      CHART REVIEW:  Chart review through multiple clinic notes shows her oxygen saturations are usually 89 to 93%.       REVIEW OF SYSTEMS  Review of Systems   Constitutional:  Negative for fever.   Respiratory:  Positive for cough and shortness of breath.    Cardiovascular:  Negative for chest pain.   Gastrointestinal:  Positive for abdominal pain, constipation and nausea. Negative for diarrhea and vomiting.   Genitourinary:  Negative for dysuria.   Neurological:  Positive for dizziness and speech difficulty. Negative for weakness and headaches.   All other systems reviewed and are negative.    PAST MEDICAL HISTORY:  Past Medical History:   Diagnosis Date    Anemia     Aortic stenosis     Atrial fibrillation (H)     Atrial flutter (H)     Benign neoplasm of adenomatous polyp     large intestine     Chronic constipation     Chronic pain syndrome     COPD (chronic obstructive pulmonary disease) (H)     Oxygen at night     Dependence on supplemental oxygen     Oxygen at noc, during the day as needed    Depression     Diabetes mellitus (H)     Dry eye syndrome     Fibromyalgia     Ganglion     left wrist    GERD (gastroesophageal reflux disease)     Hyperlipidemia     Hypertension     Hypokalemia     Infective otitis externa, unspecified     Created by Conversion     Larynx edema     Lung disease     Malignant neoplasm of vulva (H)     Created by Conversion [facility] Annotation: Apr 17 2007  8:24AM - Cammy Bui:  resection per Dr. Alfonso Mane 9/06;  Replacement Utility updated for latest IMO load    Medial epicondylitis     Onychomycosis     Osteoarthritis     Peptic ulcer     Polyneuropathy     Vulvar malignant neoplasm (H)          PAST SURGICAL HISTORY:  Past Surgical History:   Procedure Laterality Date    BIOPSY BREAST Right     BIOPSY BREAST Right 01/28/2015    BIOPSY BREAST Right 1/28/2015  "   Procedure: RIGHT BREAST BIOPSY AFTER WIRE LOCALIZATION AT 0940;  Surgeon: Renée Soriano MD;  Location: [facility];  Service:     BIOPSY OF BREAST, INCISIONAL      Description: Incisional Breast Biopsy;  Recorded: 11/13/2007;  Comments: benign    COLONOSCOPY N/A 6/14/2019    Procedure: COLONOSCOPY;  Surgeon: Eduardo Mora MD;  Location: [facility] [place];  Service: Gastroenterology    ESOPHAGOSCOPY, GASTROSCOPY, DUODENOSCOPY (EGD), COMBINED N/A 11/6/2018    Procedure: ESOPHAGOGASTRODUODENOSCOPY;  Surgeon: Lit Fernando MD;  Location: [facility] [place];  Service:     HYSTERECTOMY      JOINT REPLACEMENT Left     TKA    PICC TRIPLE LUMEN PLACEMENT  1/12/2023         MI ABLATE HEART DYSRHYTHM FOCUS      Description: Catheter Ablation Atrial Fibrillation;  Recorded: 07/31/2012;  Comments: 7/24/12 PVI with Dr. Gardiner and nilay to all 5 pulm veins and CTI fl ablation line as well.    [facility] SUPRACERV ABD HYSTERECTOMY      Description: Supracervical Hysterectomy;  Proc Date: 01/01/1985;  Comments: some cervix left!; ovaries intact; done for bleeding         CURRENT MEDICATIONS:    Prior to Admission medications    Medication Sig Start Date End Date Taking? Authorizing Provider   ACCU-CHEK SOFTCLIX LANCETS lancets [ACCU-CHEK SOFTCLIX LANCETS LANCETS] TEST THREE TIMES DAILY 8/28/18   Cammy Bui MD   albuterol (PROAIR HFA/PROVENTIL HFA/VENTOLIN HFA) 108 (90 Base) MCG/ACT inhaler INHALE 2 PUFFS INTO THE LUNGS EVERY 4 HOURS AS NEEDED FOR WHEEZING 11/15/23   Cammy Bui MD   apixaban ANTICOAGULANT (ELIQUIS) 5 MG tablet Take 1 tablet (5 mg) by mouth 2 times daily 3/21/23   Cammy uBi MD   atorvastatin (LIPITOR) 20 MG tablet TAKE 1 TABLET(20 MG) BY MOUTH AT BEDTIME 6/19/23   Cammy Bui MD   BD ULTRA-FINE SHORT PEN NEEDLE 31 gauge x 5/16\" Ndle [BD ULTRA-FINE SHORT PEN NEEDLE 31 GAUGE X 5/16\" NDLE] TEST FOUR TIMES DAILY WITH MEALS AND AT " BEDTIME 7/24/19   Cammy Bui MD   blood-glucose meter (ONETOUCH VERIO IQ METER) Misc [BLOOD-GLUCOSE METER (ONETOUCH VERIO IQ METER) MISC] Check blood sugar three times a day. 10/30/19   Cammy Bui MD   diaper,brief,adult,disposable (ADULT BRIEFS - LARGE) Misc [DIAPER,BRIEF,ADULT,DISPOSABLE (ADULT BRIEFS - LARGE) MISC] Use 3-4 daily as needed for incontinence 8/16/19   Cammy Bui MD   digoxin (LANOXIN) 125 MCG tablet TAKE 1 TABLET(125 MCG) BY MOUTH DAILY 7/5/23   Cammy Bui MD   diltiazem ER COATED BEADS (CARDIZEM CD/CARTIA XT) 120 MG 24 hr capsule TAKE 1 CAPSULE(120 MG) BY MOUTH DAILY 7/5/23   Cammy Bui MD   Eucerin external lotion Use liberally to dry skin BID and prn. 11/28/23   Cammy Bui MD   furosemide (LASIX) 20 MG tablet TAKE 1 TABLET(20 MG) BY MOUTH TWICE DAILY 8/12/23   Cammy Bui MD   gabapentin (NEURONTIN) 600 MG tablet TAKE 1 TABLET(600 MG) BY MOUTH THREE TIMES DAILY 12/17/23   Cammy Bui MD   generic lancets (FINGERSTIX LANCETS) [GENERIC LANCETS (FINGERSTIX LANCETS)] Dispense brand per patient's insurance at pharmacy discretion. 3/5/19   Cammy Bui MD   ipratropium - albuterol 0.5 mg/2.5 mg/3 mL (DUONEB) 0.5-2.5 (3) MG/3ML neb solution Take 1 vial (3 mLs) by nebulization every 6 hours as needed for shortness of breath or wheezing 12/26/23   Cammy Bui MD   JARDIANCE 10 MG TABS tablet TAKE 1 TABLET(10 MG) BY MOUTH DAILY 7/5/23   Cammy Bui MD   magnesium oxide (MAG-OX) 400 MG tablet Take 1 tablet (400 mg) by mouth daily 2/9/23   Angela Kaiser MD   meclizine (ANTIVERT) 25 MG tablet TAKE 1 TABLET(25 MG) BY MOUTH THREE TIMES DAILY AS NEEDED FOR DIZZINESS OR NAUSEA 12/26/23   Cammy Bui MD   metoprolol tartrate (LOPRESSOR) 25 MG tablet TAKE 1 TABLET(25 MG) BY MOUTH  "TWICE DAILY 7/5/23   Cammy Bui MD   naloxone (NARCAN) 4 MG/0.1ML nasal spray Spray 1 spray (4 mg) into one nostril alternating nostrils once as needed for opioid reversal every 2-3 minutes until assistance arrives  Patient not taking: Reported on 12/26/2023 2/21/22   Cammy Bui MD   Nutritional Supplements (ENSURE COMPLETE) LIQD Take 1 Can by mouth daily 10/2/23   Cammy Bui MD   nystatin (NYSTOP) 114434 UNIT/GM external powder APPLY TOPICALLY TO THE AFFECTED AREA 2-3 TIMES DAILY AS NEEDED 9/20/23   Cammy Bui MD   omeprazole (PRILOSEC) 20 MG DR capsule TAKE 1 CAPSULE(20 MG) BY MOUTH TWICE DAILY 1/15/24   Cammy Bui MD   ONETOUCH VERIO IQ test strip TEST THREE TIMES DAILY 1/15/24   Cammy Bui MD   oxyCODONE IR (ROXICODONE) 10 MG tablet Take 1 tablet (10 mg) by mouth every 6 hours as needed for moderate to severe pain 1/29/24   Cammy Bui MD   potassium chloride ER (KLOR-CON M) 20 MEQ CR tablet TAKE 1 TABLET(20 MEQ) BY MOUTH DAILY 8/12/23   Cammy Bui MD   sucralfate (CARAFATE) 1 GM tablet CRUSH ONE TABLET AND MIX WITH A LLITTLE WATER AND SWALLOW FOUR TIMES DAILY 1/19/24   Cammy Bui MD   SYMBICORT 160-4.5 MCG/ACT Inhaler INHALE 2 PUFFS INTO THE LUNGS TWICE DAILY 12/12/23   Cammy Bui MD         ALLERGIES:  Allergies   Allergen Reactions    Celebrex [Celecoxib] Rash     patient had butterfly rash - \"lupus-like\"      Latex Rash         FAMILY HISTORY:  Family History   Problem Relation Age of Onset    Heart Failure Mother     Cancer Other         paternal HX-laryngeal     Alcoholism Sister     No Known Problems Daughter     No Known Problems Maternal Grandmother     No Known Problems Maternal Grandfather     No Known Problems Paternal Grandmother     No Known Problems Paternal Grandfather     No Known Problems Maternal " Aunt     No Known Problems Paternal Aunt     Alcoholism Sister     Alcoholism Brother     Alcoholism Father     Cancer Paternal Uncle         Gastric-Alcohol    Cancer Paternal Uncle         gastric-Alcohol    Hereditary Breast and Ovarian Cancer Syndrome No family hx of     Breast Cancer No family hx of     Colon Cancer No family hx of     Endometrial Cancer No family hx of     Ovarian Cancer No family hx of          SOCIAL HISTORY:  Social History     Socioeconomic History    Marital status:    Tobacco Use    Smoking status: Some Days     Packs/day: .25     Types: Cigarettes     Passive exposure: Never    Smokeless tobacco: Never    Tobacco comments:     seen by TTS inpatient on 3/31/22   Vaping Use    Vaping Use: Never used   Substance and Sexual Activity    Alcohol use: Yes     Comment: Alcoholic Drinks/day: very little    Drug use: No     Social Determinants of Health     Financial Resource Strain: Low Risk  (12/26/2023)    Financial Resource Strain     Within the past 12 months, have you or your family members you live with been unable to get utilities (heat, electricity) when it was really needed?: No   Food Insecurity: Low Risk  (12/26/2023)    Food Insecurity     Within the past 12 months, did you worry that your food would run out before you got money to buy more?: No     Within the past 12 months, did the food you bought just not last and you didn t have money to get more?: No   Transportation Needs: Low Risk  (12/26/2023)    Transportation Needs     Within the past 12 months, has lack of transportation kept you from medical appointments, getting your medicines, non-medical meetings or appointments, work, or from getting things that you need?: No   Interpersonal Safety: Low Risk  (9/20/2023)    Interpersonal Safety     Do you feel physically and emotionally safe where you currently live?: Yes     Within the past 12 months, have you been hit, slapped, kicked or otherwise physically hurt by  "someone?: No     Within the past 12 months, have you been humiliated or emotionally abused in other ways by your partner or ex-partner?: No   Housing Stability: Low Risk  (12/26/2023)    Housing Stability     Do you have housing? : Yes     Are you worried about losing your housing?: No         VITALS:  Patient Vitals for the past 24 hrs:   BP Temp Temp src Pulse Resp SpO2 Height Weight   02/06/24 2311 -- -- -- -- -- (!) 89 % -- --   02/06/24 2246 -- -- -- -- -- (!) 88 % -- --   02/06/24 2245 136/60 -- -- 64 20 -- -- --   02/06/24 2233 (!) 142/59 -- -- 64 16 (!) 89 % -- --   02/06/24 2149 110/57 -- -- 62 -- (!) 89 % -- --   02/06/24 2131 -- -- -- 61 26 90 % -- --   02/06/24 1930 120/59 -- -- -- -- -- -- --   02/06/24 1929 -- -- -- 55 22 91 % -- --   02/06/24 1911 124/60 -- -- 55 18 93 % -- --   02/06/24 1756 -- -- -- -- -- 94 % -- --   02/06/24 1750 116/60 98.6  F (37  C) Temporal 60 24 (!) 80 % 1.651 m (5' 5\") 59 kg (130 lb)       Wt Readings from Last 3 Encounters:   02/06/24 59 kg (130 lb)   01/29/24 54.9 kg (121 lb)   09/20/23 54.9 kg (121 lb)       Estimated Creatinine Clearance: 58.9 mL/min (based on SCr of 0.78 mg/dL).    PHYSICAL EXAM    Constitutional:  Well developed, Well nourished, NAD  HENT:  Normocephalic, Atraumatic, Bilateral external ears normal, Nose normal. Neck- Supple, No stridor.   Eyes:  PERRL, EOMI, Conjunctiva normal, No discharge.  Respiratory:  Normal breath sounds, No respiratory distress, No wheezing, Speaks full sentences easily. No cough.   Cardiovascular:  Normal heart rate, Regular rhythm, No rubs, No gallops. Chest wall nontender.   GI:  No excessive obesity.  Bowel sounds normal, Soft, +mild diffuse tenderness, No masses, No flank tenderness. No rebound or guarding.   : deferred  Musculoskeletal: 2+ DP pulses. No pitting edema. No cyanosis, No clubbing. Good range of motion in all major joints. No major deformities noted.   Integument:  Warm, Dry, No erythema, No rash.  No " petechiae.   Neurologic:  Alert & oriented x 3  Psychiatric:  Affect normal, Cooperative         LAB:  All pertinent labs reviewed and interpreted.  Recent Results (from the past 24 hour(s))   Lactic acid whole blood    Collection Time: 02/06/24  7:52 PM   Result Value Ref Range    Lactic Acid 1.7 0.7 - 2.0 mmol/L   Magnesium    Collection Time: 02/06/24  7:53 PM   Result Value Ref Range    Magnesium 2.1 1.7 - 2.3 mg/dL   Troponin T, High Sensitivity    Collection Time: 02/06/24  7:53 PM   Result Value Ref Range    Troponin T, High Sensitivity 28 (H) <=14 ng/L   Nt probnp inpatient (BNP)    Collection Time: 02/06/24  7:53 PM   Result Value Ref Range    N terminal Pro BNP Inpatient 534 0 - 900 pg/mL   Lipase    Collection Time: 02/06/24  7:53 PM   Result Value Ref Range    Lipase 12 (L) 13 - 60 U/L   Comprehensive metabolic panel    Collection Time: 02/06/24  7:53 PM   Result Value Ref Range    Sodium 140 135 - 145 mmol/L    Potassium 4.0 3.4 - 5.3 mmol/L    Carbon Dioxide (CO2) 30 (H) 22 - 29 mmol/L    Anion Gap 11 7 - 15 mmol/L    Urea Nitrogen 21.4 8.0 - 23.0 mg/dL    Creatinine 0.78 0.51 - 0.95 mg/dL    GFR Estimate 79 >60 mL/min/1.73m2    Calcium 9.2 8.8 - 10.2 mg/dL    Chloride 99 98 - 107 mmol/L    Glucose 74 70 - 99 mg/dL    Alkaline Phosphatase 88 40 - 150 U/L    AST 20 0 - 45 U/L    ALT 13 0 - 50 U/L    Protein Total 7.3 6.4 - 8.3 g/dL    Albumin 3.6 3.5 - 5.2 g/dL    Bilirubin Total 0.6 <=1.2 mg/dL   Bilirubin direct    Collection Time: 02/06/24  7:53 PM   Result Value Ref Range    Bilirubin Direct 0.22 0.00 - 0.30 mg/dL   CBC with platelets and differential    Collection Time: 02/06/24  7:53 PM   Result Value Ref Range    WBC Count 5.2 4.0 - 11.0 10e3/uL    RBC Count 4.65 3.80 - 5.20 10e6/uL    Hemoglobin 14.0 11.7 - 15.7 g/dL    Hematocrit 43.9 35.0 - 47.0 %    MCV 94 78 - 100 fL    MCH 30.1 26.5 - 33.0 pg    MCHC 31.9 31.5 - 36.5 g/dL    RDW 14.0 10.0 - 15.0 %    Platelet Count 151 150 - 450 10e3/uL     % Neutrophils 59 %    % Lymphocytes 23 %    % Monocytes 15 %    % Eosinophils 2 %    % Basophils 1 %    % Immature Granulocytes 0 %    NRBCs per 100 WBC 0 <1 /100    Absolute Neutrophils 3.1 1.6 - 8.3 10e3/uL    Absolute Lymphocytes 1.2 0.8 - 5.3 10e3/uL    Absolute Monocytes 0.8 0.0 - 1.3 10e3/uL    Absolute Eosinophils 0.1 0.0 - 0.7 10e3/uL    Absolute Basophils 0.0 0.0 - 0.2 10e3/uL    Absolute Immature Granulocytes 0.0 <=0.4 10e3/uL    Absolute NRBCs 0.0 10e3/uL   Extra Red Top Tube    Collection Time: 02/06/24  7:53 PM   Result Value Ref Range    Hold Specimen JIC    Extra Blue Top Tube    Collection Time: 02/06/24  7:59 PM   Result Value Ref Range    Hold Specimen JIC    Symptomatic Influenza A/B, RSV, & SARS-CoV2 PCR (COVID-19) Nasopharyngeal    Collection Time: 02/06/24  8:40 PM    Specimen: Nasopharyngeal; Swab   Result Value Ref Range    Influenza A PCR Negative Negative    Influenza B PCR Negative Negative    RSV PCR Negative Negative    SARS CoV2 PCR Negative Negative   UA with Microscopic reflex to Culture    Collection Time: 02/06/24  9:26 PM    Specimen: Urine, Midstream   Result Value Ref Range    Color Urine Yellow Colorless, Straw, Light Yellow, Yellow    Appearance Urine Turbid (A) Clear    Glucose Urine >1000 (A) Negative mg/dL    Bilirubin Urine Negative Negative    Ketones Urine Negative Negative mg/dL    Specific Gravity Urine 1.026 1.001 - 1.030    Blood Urine Negative Negative    pH Urine 5.5 5.0 - 7.0    Protein Albumin Urine 10 (A) Negative mg/dL    Urobilinogen Urine <2.0 <2.0 mg/dL    Nitrite Urine Negative Negative    Leukocyte Esterase Urine 75 Mahesh/uL (A) Negative    Mucus Urine Present (A) None Seen /LPF    RBC Urine 1 <=2 /HPF    WBC Urine 2 <=5 /HPF    Squamous Epithelials Urine 2 (H) <=1 /HPF   Troponin T, High Sensitivity (now)    Collection Time: 02/06/24 10:03 PM   Result Value Ref Range    Troponin T, High Sensitivity 27 (H) <=14 ng/L       Lab Results   Component Value Date     ABORH O POS 05/26/2023           RADIOLOGY:  Reviewed all pertinent imaging. Please see official radiology report.    CT Abdomen Pelvis w Contrast   Final Result   IMPRESSION:   1.  No pulmonary artery embolus.   2.  Moderate to large volume right pleural effusion is similar as compared to the prior with associated areas of compressive atelectasis. Previously noted trace left pleural effusion has resolved.   3.  A previously noted 2.4 x 2.0 cm apparent rounded nodule of the lateral segment of the right middle lobe is smaller on today's examination measuring 1.5 x 1.3 cm. This may represent an evolving area of rounded atelectasis however continued attention    on followup imaging is recommended.   4.  Probable mild hepatic steatosis.   5.  Adenomyomatosis of the gallbladder fundus, otherwise the gallbladder is unremarkable.   6.  Atrophic pancreas with scattered small calcifications, findings consistent with chronic pancreatitis. No acute peripancreatic inflammation.   7.  Mild thickening of the urinary bladder, cystitis is possible. Recommend correlation with urinalysis.         CT Chest Pulmonary Embolism w Contrast   Final Result   IMPRESSION:   1.  No pulmonary artery embolus.   2.  Moderate to large volume right pleural effusion is similar as compared to the prior with associated areas of compressive atelectasis. Previously noted trace left pleural effusion has resolved.   3.  A previously noted 2.4 x 2.0 cm apparent rounded nodule of the lateral segment of the right middle lobe is smaller on today's examination measuring 1.5 x 1.3 cm. This may represent an evolving area of rounded atelectasis however continued attention    on followup imaging is recommended.   4.  Probable mild hepatic steatosis.   5.  Adenomyomatosis of the gallbladder fundus, otherwise the gallbladder is unremarkable.   6.  Atrophic pancreas with scattered small calcifications, findings consistent with chronic pancreatitis. No acute  peripancreatic inflammation.   7.  Mild thickening of the urinary bladder, cystitis is possible. Recommend correlation with urinalysis.         Head CT w/o contrast   Final Result   IMPRESSION:   1.  No CT evidence for acute intracranial process.   2.  Brain atrophy and presumed chronic microvascular ischemic changes.   3.  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 Recommend follow-up MR or CT angiogram to exclude intracranial aneurysm.            EKG:    Indication: dizziness    Performed at: 19:11p  Impression: Sinus bradycardia at 55 bpm.  Motion artifact but no ST elevation appreciated.  Flipped T waves in lead aVR and possibly aVL.  OK interval 184 ms,  ms, QTc 417 ms.  Nonspecific ST changes compared to August 3, 2023.      I have independently reviewed and interpreted the EKG(s) documented above.        PROCEDURES:  none        Medical Decision Making  Obtained supplemental history:Supplemental history obtained?: Documented in chart and Family Member/Significant Other  Reviewed external records: External records reviewed?: Documented in chart and Outpatient Record: clinic notes show pOx of 89-93% on her home oxygen  Care impacted by chronic illness:Chronic Lung Disease and Heart Disease  Care significantly affected by social determinants of health:Access to Medical Care  Did you consider but not order tests?: Work up considered but not performed and documented in chart, if applicable  Did you interpret images independently?: Independent interpretation of ECG and images noted in documentation, when applicable.  Consultation discussion with other provider:Did you involve another provider (consultant, , pharmacy, etc.)?: No  Admit.    Sanam Sneed M.D. [facility]  Emergency Medicine and Medical Toxicology  Formerly [facility] EMERGENCY DEPARTMENT  [facility] [id number] [phone]  Dept: [phone]            Sanam Sneed MD  02/06/24 2176

## 2024-02-07 NOTE — H&P
[facility]    History and Physical - Hospitalist Service       Date of Admission:  2/6/2024    Assessment & Plan      Mary Kay Tejada is a 74 year old female admitted on 2/6/2024. She has a hx of COPD on O2 at home(2.5 liters),active smoker, hx of severe aortic stenosis(was to have TAVR this Thursday, hx of CAD, Afib/Flutter, who comes in with dizziness, sob, and lower abd pain concerning for cystitis      1.Dizziness  -I suspect this is due to her severe aortic stenosis and she describes this ongoing for months , worse when she gets up  -I suspect she is dropping her BP , and likely getting orthostatic  -ct head done -no acute findings, mri brain recommended--she declined with me    2.SOB  -has significant right sided effusion, and may need thoracentesis--had this last year  -was on lasix, may need more diuretics, but difficult with severe aortic stenosis  -also concern for copd exacerbation, nebs and steroids helping    3.Severe aortic stenosis  -get cards to see her now  -tele    4.COPD exacerbation  -improving with steroids and nebs  -also Acute hypoxic resp failure --needing more than home O2  -suspect this is due to copd + pleural fluid issues     5.smoker  -1 ppd  -declined patch, not interested in stopping    6.hx of Afib/flutter  -clarify meds    7.hx of cad  -no cp  -trops level so far    8.Deconditioning  -having difficult time getting around due to CONTE, suspect copd advance, and valve issues  -high risk falls--had fall at home recently  -fall precautions    9.DM  -was told to hold her po meds in anticipation for TAVR  -here cover with insulin carb count and scale    10.Abnl UA and concern cystitis  -UC pending  -Ceftriaxone started  -blood cx sent   -procal    11.Chronic pain, fibromyalgia  -on oxy    12.Hx of mood d/o    13.Code-Full    13.DVT prevent- was on DOAC-clarify which one and continue             Diet: NPO for Medical/Clinical Reasons Except for: No Exceptions    DVT  Prophylaxis: DOAC  Chairez Catheter: Not present  Lines: None     Cardiac Monitoring: None  Code Status:  full    Clinically Significant Risk Factors Present on Admission               # Drug Induced Coagulation Defect: home medication list includes an anticoagulant medication    # Hypertension: Noted on problem list   # Acute Respiratory Failure: Documented O2 saturation < 91%.  Continue supplemental oxygen as needed                Disposition Plan      Expected Discharge Date: 02/08/2024                  Julissa Durbin MD  Hospitalist Service  [facility]  Securely message with [employer] (more info)  Text page via [address] Paging/Directory     ______________________________________________________________________    Chief Complaint   Dizzy, sob    History is obtained from the patient    History of Present Illness     Mary Kay Tejada is a 74 year old female admitted on 2/6/2024. She has a hx of COPD on O2 at home(2.5 liters),active smoker, hx of severe aortic stenosis(was to have TAVR this Thursday, hx of CAD, Afib/Flutter, who comes in with dizziness, sob, and lower abd pain     First, she notes for months, dizziness when she goes to get up at all, even sit at edge of chair or bed  No headache  Had fall at home days ago  She notes CONTE with minimal and more so today  No cp  No fever  No chills        In ER got nebs and steroid and feels better  At home on 2.5 liters O2--last took prednisone at home a month ago   Still smokes--told me 1 ppd    Was to have TAVR this Thursday for severe aortic stenosis      Past Medical History    Past Medical History:   Diagnosis Date    Anemia     Aortic stenosis     Atrial fibrillation (H)     Atrial flutter (H)     Benign neoplasm of adenomatous polyp     large intestine     Chronic constipation     Chronic pain syndrome     COPD (chronic obstructive pulmonary disease) (H)     Oxygen at night     Dependence on supplemental oxygen     Oxygen at noc, during the  day as needed    Depression     Diabetes mellitus (H)     Dry eye syndrome     Fibromyalgia     Ganglion     left wrist    GERD (gastroesophageal reflux disease)     Hyperlipidemia     Hypertension     Hypokalemia     Infective otitis externa, unspecified     Created by Conversion     Larynx edema     Lung disease     Malignant neoplasm of vulva (H)     Created by Conversion [facility] Annotation: Apr 17 2007  8:24AM - PattikurtisCammy basilio:  resection per Dr. Alfonso Mane 9/06;  Replacement Utility updated for latest IMO load    Medial epicondylitis     Onychomycosis     Osteoarthritis     Peptic ulcer     Polyneuropathy     Vulvar malignant neoplasm (H)        Past Surgical History   Past Surgical History:   Procedure Laterality Date    BIOPSY BREAST Right     BIOPSY BREAST Right 01/28/2015    BIOPSY BREAST Right 1/28/2015    Procedure: RIGHT BREAST BIOPSY AFTER WIRE LOCALIZATION AT 0940;  Surgeon: Renée Soriano MD;  Location: [facility];  Service:     BIOPSY OF BREAST, INCISIONAL      Description: Incisional Breast Biopsy;  Recorded: 11/13/2007;  Comments: benign    COLONOSCOPY N/A 6/14/2019    Procedure: COLONOSCOPY;  Surgeon: Eduardo Mora MD;  Location: [facility];  Service: Gastroenterology    ESOPHAGOSCOPY, GASTROSCOPY, DUODENOSCOPY (EGD), COMBINED N/A 11/6/2018    Procedure: ESOPHAGOGASTRODUODENOSCOPY;  Surgeon: Lit Fernando MD;  Location: [facility];  Service:     HYSTERECTOMY      JOINT REPLACEMENT Left     TKA    PICC TRIPLE LUMEN PLACEMENT  1/12/2023         MD ABLATE HEART DYSRHYTHM FOCUS      Description: Catheter Ablation Atrial Fibrillation;  Recorded: 07/31/2012;  Comments: 7/24/12 PVI with Dr. Bansal to all 5 pulm veins and CTI fl ablation line as well.    [facility] SUPRACERV ABD HYSTERECTOMY      Description: Supracervical Hysterectomy;  Proc Date: 01/01/1985;  Comments: some cervix left!; ovaries intact; done for bleeding       Prior to Admission  "Medications   Prior to Admission Medications   Prescriptions Last Dose Informant Patient Reported? Taking?   ACCU-CHEK SOFTCLIX LANCETS lancets   No No   Sig: [ACCU-CHEK SOFTCLIX LANCETS LANCETS] TEST THREE TIMES DAILY   BD ULTRA-FINE SHORT PEN NEEDLE 31 gauge x 5/16\" Ndle   No No   Sig: [BD ULTRA-FINE SHORT PEN NEEDLE 31 GAUGE X 5/16\" NDLE] TEST FOUR TIMES DAILY WITH MEALS AND AT BEDTIME   Eucerin external lotion   No No   Sig: Use liberally to dry skin BID and prn.   JARDIANCE 10 MG TABS tablet   No No   Sig: TAKE 1 TABLET(10 MG) BY MOUTH DAILY   Nutritional Supplements (ENSURE COMPLETE) LIQD   No No   Sig: Take 1 Can by mouth daily   ONETOUCH VERIO IQ test strip   No No   Sig: TEST THREE TIMES DAILY   SYMBICORT 160-4.5 MCG/ACT Inhaler   No No   Sig: INHALE 2 PUFFS INTO THE LUNGS TWICE DAILY   albuterol (PROAIR HFA/PROVENTIL HFA/VENTOLIN HFA) 108 (90 Base) MCG/ACT inhaler   No No   Sig: INHALE 2 PUFFS INTO THE LUNGS EVERY 4 HOURS AS NEEDED FOR WHEEZING   apixaban ANTICOAGULANT (ELIQUIS) 5 MG tablet   No No   Sig: Take 1 tablet (5 mg) by mouth 2 times daily   atorvastatin (LIPITOR) 20 MG tablet   No No   Sig: TAKE 1 TABLET(20 MG) BY MOUTH AT BEDTIME   blood-glucose meter (ONETOUCH VERIO IQ METER) Misc   No No   Sig: [BLOOD-GLUCOSE METER (ONETOUCH VERIO IQ METER) MISC] Check blood sugar three times a day.   diaper,brief,adult,disposable (ADULT BRIEFS - LARGE) Misc   No No   Sig: [DIAPER,BRIEF,ADULT,DISPOSABLE (ADULT BRIEFS - LARGE) MISC] Use 3-4 daily as needed for incontinence   digoxin (LANOXIN) 125 MCG tablet   No No   Sig: TAKE 1 TABLET(125 MCG) BY MOUTH DAILY   diltiazem ER COATED BEADS (CARDIZEM CD/CARTIA XT) 120 MG 24 hr capsule   No No   Sig: TAKE 1 CAPSULE(120 MG) BY MOUTH DAILY   furosemide (LASIX) 20 MG tablet   No No   Sig: TAKE 1 TABLET(20 MG) BY MOUTH TWICE DAILY   gabapentin (NEURONTIN) 600 MG tablet   No No   Sig: TAKE 1 TABLET(600 MG) BY MOUTH THREE TIMES DAILY   generic lancets (FINGERSTIX LANCETS) "   No No   Sig: [GENERIC LANCETS (FINGERSTIX LANCETS)] Dispense brand per patient's insurance at pharmacy discretion.   ipratropium - albuterol 0.5 mg/2.5 mg/3 mL (DUONEB) 0.5-2.5 (3) MG/3ML neb solution   No No   Sig: Take 1 vial (3 mLs) by nebulization every 6 hours as needed for shortness of breath or wheezing   magnesium oxide (MAG-OX) 400 MG tablet   No No   Sig: Take 1 tablet (400 mg) by mouth daily   meclizine (ANTIVERT) 25 MG tablet   No No   Sig: TAKE 1 TABLET(25 MG) BY MOUTH THREE TIMES DAILY AS NEEDED FOR DIZZINESS OR NAUSEA   metoprolol tartrate (LOPRESSOR) 25 MG tablet   No No   Sig: TAKE 1 TABLET(25 MG) BY MOUTH TWICE DAILY   naloxone (NARCAN) 4 MG/0.1ML nasal spray   No No   Sig: Spray 1 spray (4 mg) into one nostril alternating nostrils once as needed for opioid reversal every 2-3 minutes until assistance arrives   Patient not taking: Reported on 12/26/2023   nystatin (NYSTOP) 614866 UNIT/GM external powder   No No   Sig: APPLY TOPICALLY TO THE AFFECTED AREA 2-3 TIMES DAILY AS NEEDED   omeprazole (PRILOSEC) 20 MG DR capsule   No No   Sig: TAKE 1 CAPSULE(20 MG) BY MOUTH TWICE DAILY   oxyCODONE IR (ROXICODONE) 10 MG tablet   No No   Sig: Take 1 tablet (10 mg) by mouth every 6 hours as needed for moderate to severe pain   potassium chloride ER (KLOR-CON M) 20 MEQ CR tablet   No No   Sig: TAKE 1 TABLET(20 MEQ) BY MOUTH DAILY   sucralfate (CARAFATE) 1 GM tablet   No No   Sig: CRUSH ONE TABLET AND MIX WITH A LLITTLE WATER AND SWALLOW FOUR TIMES DAILY      Facility-Administered Medications: None        Review of Systems    CONSTITUTIONAL:  positive for  fatigue  EYES:  negative  HEENT:  negative  RESPIRATORY:  positive for  dyspnea/copd home O2  CARDIOVASCULAR:  positive for  dyspnea--needs TAVR  GASTROINTESTINAL:  positive for lower abd pain  GENITOURINARY:  negative  INTEGUMENT/BREAST:  negative  HEMATOLOGIC/LYMPHATIC:  negative  ALLERGIC/IMMUNOLOGIC:  negative  ENDOCRINE:  positive for  "dm  MUSCULOSKELETAL:  negative  NEUROLOGICAL:  positive for dizziness  BEHAVIOR/PSYCH:  negative    Social History   I have reviewed this patient's social history and updated it with pertinent information if needed.  Social History     Tobacco Use    Smoking status: Some Days     Packs/day: .25     Types: Cigarettes     Passive exposure: Never    Smokeless tobacco: Never    Tobacco comments:     seen by TTS inpatient on 3/31/22   Vaping Use    Vaping Use: Never used   Substance Use Topics    Alcohol use: Yes     Comment: Alcoholic Drinks/day: very little    Drug use: No         Family History   I have reviewed this patient's family history and updated it with pertinent information if needed.  Family History   Problem Relation Age of Onset    Heart Failure Mother     Cancer Other         paternal HX-laryngeal     Alcoholism Sister     No Known Problems Daughter     No Known Problems Maternal Grandmother     No Known Problems Maternal Grandfather     No Known Problems Paternal Grandmother     No Known Problems Paternal Grandfather     No Known Problems Maternal Aunt     No Known Problems Paternal Aunt     Alcoholism Sister     Alcoholism Brother     Alcoholism Father     Cancer Paternal Uncle         Gastric-Alcohol    Cancer Paternal Uncle         gastric-Alcohol    Hereditary Breast and Ovarian Cancer Syndrome No family hx of     Breast Cancer No family hx of     Colon Cancer No family hx of     Endometrial Cancer No family hx of     Ovarian Cancer No family hx of          Allergies   Allergies   Allergen Reactions    Celebrex [Celecoxib] Rash     patient had butterfly rash - \"lupus-like\"      Latex Rash        Physical Exam   Vital Signs: Temp: 98.6  F (37  C) Temp src: Temporal BP: 125/58 Pulse: 55   Resp: 17 SpO2: 98 % O2 Device: Nasal cannula Oxygen Delivery: 6 LPM  Weight: 130 lbs 0 oz    Constitutional: awake, alert, cooperative, and no apparent distress, looks older than stated age ,muscle wasting   Eyes: " sclera clear  ENT: normocepalic, without obvious abnormality  Respiratory: no increased work of breathing, moderate air exchange, no retractions, and diminished breath sounds throughout lungs  Cardiovascular: regular rate and rhythm, murmur systolic harsh and normal S1 and S2  GI: normal bowel sounds, soft, non-distended, and non-tender  Skin: anterior shin on right blister  Musculoskeletal: no lower extremity pitting edema present  Neurologic: Mental Status Exam:  Level of Alertness:   awake, motor moves ext equal, speech fluent  Neuropsychiatric: General: normal, calm, and normal eye contact    Medical Decision Making       75 MINUTES SPENT BY ME on the date of service doing chart review, history, exam, documentation & further activities per the note.      Data     I have personally reviewed the following data over the past 24 hrs:    5.2  \   14.0   / 151     140 99 21.4 /  74   4.0 30 (H) 0.78 \     ALT: 13 AST: 20 AP: 88 TBILI: 0.6   ALB: 3.6 TOT PROTEIN: 7.3 LIPASE: 12 (L)     Trop: 27 (H) BNP: 534     Procal: N/A CRP: N/A Lactic Acid: 1.7         Imaging results reviewed over the past 24 hrs:   Recent Results (from the past 24 hour(s))   Head CT w/o contrast    Narrative    EXAM: CT HEAD W/O CONTRAST  LOCATION: [facility]  DATE: 2/6/2024    INDICATION: Slurred speech.  COMPARISON: None.  TECHNIQUE: Routine CT Head without IV contrast. Multiplanar reformats. Dose reduction techniques were used.    FINDINGS:  INTRACRANIAL CONTENTS: No intracranial hemorrhage, extraaxial collection, or mass effect.  No CT evidence of acute infarct. Mild presumed chronic small vessel ischemic changes. 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 Mild generalized volume loss. No hydrocephalus.   Focal prominence at the left MCA bifurcation measuring 5 mm.    VISUALIZED ORBITS/SINUSES/MASTOIDS: No intraorbital abnormality. No paranasal sinus mucosal disease. No middle ear or mastoid effusion.    BONES/SOFT TISSUES: No  acute abnormality.      Impression    IMPRESSION:  1.  No CT evidence for acute intracranial process.  2.  Brain atrophy and presumed chronic microvascular ischemic changes.  3.  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 Recommend follow-up MR or CT angiogram to exclude intracranial aneurysm.   CT Chest Pulmonary Embolism w Contrast    Narrative    EXAM: CT CHEST PULMONARY EMBOLISM W CONTRAST, CT ABDOMEN PELVIS W CONTRAST  LOCATION: [facility]  DATE: 2/6/2024    INDICATION: abd pain, nausea  COMPARISON: CT angiogram TAVR 05/26/2023  TECHNIQUE: CT angiogram chest abdomen pelvis during arterial phase of injection of IV contrast. 2D and 3D MIP reconstructions were performed by the CT technologist. Dose reduction techniques were used.   CONTRAST: 64ml isovue 370    FINDINGS:   CT ANGIOGRAM CHEST, ABDOMEN, AND PELVIS: No pulmonary embolus. Atherosclerotic calcifications of the aortic arch and descending thoracic aorta. No evidence of thoracic aortic dissection or aneurysm. Atherosclerotic calcifications of the aortoiliac   vessels without evidence of aneurysmal dilatation.    LUNGS AND PLEURA: A previously noted 2.4 x 2.0 cm apparent rounded nodule of the lateral segment of the right middle lobe is smaller on today's examination measuring 1.5 x 1.3 cm (series 5 image 159). Centrilobular emphysema and chronic bronchial wall   thickening. Moderate to large volume right pleural effusion is similar as compared to the prior with associated areas of compressive atelectasis. Previously noted trace left pleural effusion has resolved.     MEDIASTINUM/AXILLAE: No lymphadenopathy or pericardial effusion.    CORONARY ARTERY CALCIFICATION: Mild to moderate.    HEPATOBILIARY: Probable mild hepatic steatosis. Scattered benign hepatic cysts which require no dedicated follow-up. Several tiny calcified hepatic granulomas. Adenomyomatosis of the gallbladder fundus, otherwise the gallbladder is  unremarkable.    PANCREAS: Atrophic pancreas with scattered small calcifications, findings consistent with chronic pancreatitis. No acute peripancreatic inflammation.    SPLEEN: Normal.    ADRENAL GLANDS: Similar bilateral adrenal hyperplasia left greater than right.    KIDNEYS/BLADDER: Tiny renal cysts which require no dedicated follow-up. No hydronephrosis. Mild thickening of the urinary bladder.    BOWEL: No bowel obstruction.    LYMPH NODES: Normal.    PELVIC ORGANS: Hysterectomy.    MUSCULOSKELETAL: Severe multilevel degenerative changes of the thoracic and lumbosacral spine. Osteopenia. No acute osseous abnormality.      Impression    IMPRESSION:  1.  No pulmonary artery embolus.  2.  Moderate to large volume right pleural effusion is similar as compared to the prior with associated areas of compressive atelectasis. Previously noted trace left pleural effusion has resolved.  3.  A previously noted 2.4 x 2.0 cm apparent rounded nodule of the lateral segment of the right middle lobe is smaller on today's examination measuring 1.5 x 1.3 cm. This may represent an evolving area of rounded atelectasis however continued attention   on followup imaging is recommended.  4.  Probable mild hepatic steatosis.  5.  Adenomyomatosis of the gallbladder fundus, otherwise the gallbladder is unremarkable.  6.  Atrophic pancreas with scattered small calcifications, findings consistent with chronic pancreatitis. No acute peripancreatic inflammation.  7.  Mild thickening of the urinary bladder, cystitis is possible. Recommend correlation with urinalysis.     CT Abdomen Pelvis w Contrast    Narrative    EXAM: CT CHEST PULMONARY EMBOLISM W CONTRAST, CT ABDOMEN PELVIS W CONTRAST  LOCATION: [facility]  DATE: 2/6/2024    INDICATION: abd pain, nausea  COMPARISON: CT angiogram TAVR 05/26/2023  TECHNIQUE: CT angiogram chest abdomen pelvis during arterial phase of injection of IV contrast. 2D and 3D MIP  reconstructions were performed by the CT technologist. Dose reduction techniques were used.   CONTRAST: 64ml isovue 370    FINDINGS:   CT ANGIOGRAM CHEST, ABDOMEN, AND PELVIS: No pulmonary embolus. Atherosclerotic calcifications of the aortic arch and descending thoracic aorta. No evidence of thoracic aortic dissection or aneurysm. Atherosclerotic calcifications of the aortoiliac   vessels without evidence of aneurysmal dilatation.    LUNGS AND PLEURA: A previously noted 2.4 x 2.0 cm apparent rounded nodule of the lateral segment of the right middle lobe is smaller on today's examination measuring 1.5 x 1.3 cm (series 5 image 159). Centrilobular emphysema and chronic bronchial wall   thickening. Moderate to large volume right pleural effusion is similar as compared to the prior with associated areas of compressive atelectasis. Previously noted trace left pleural effusion has resolved.     MEDIASTINUM/AXILLAE: No lymphadenopathy or pericardial effusion.    CORONARY ARTERY CALCIFICATION: Mild to moderate.    HEPATOBILIARY: Probable mild hepatic steatosis. Scattered benign hepatic cysts which require no dedicated follow-up. Several tiny calcified hepatic granulomas. Adenomyomatosis of the gallbladder fundus, otherwise the gallbladder is unremarkable.    PANCREAS: Atrophic pancreas with scattered small calcifications, findings consistent with chronic pancreatitis. No acute peripancreatic inflammation.    SPLEEN: Normal.    ADRENAL GLANDS: Similar bilateral adrenal hyperplasia left greater than right.    KIDNEYS/BLADDER: Tiny renal cysts which require no dedicated follow-up. No hydronephrosis. Mild thickening of the urinary bladder.    BOWEL: No bowel obstruction.    LYMPH NODES: Normal.    PELVIC ORGANS: Hysterectomy.    MUSCULOSKELETAL: Severe multilevel degenerative changes of the thoracic and lumbosacral spine. Osteopenia. No acute osseous abnormality.      Impression    IMPRESSION:  1.  No pulmonary artery  embolus.  2.  Moderate to large volume right pleural effusion is similar as compared to the prior with associated areas of compressive atelectasis. Previously noted trace left pleural effusion has resolved.  3.  A previously noted 2.4 x 2.0 cm apparent rounded nodule of the lateral segment of the right middle lobe is smaller on today's examination measuring 1.5 x 1.3 cm. This may represent an evolving area of rounded atelectasis however continued attention   on followup imaging is recommended.  4.  Probable mild hepatic steatosis.  5.  Adenomyomatosis of the gallbladder fundus, otherwise the gallbladder is unremarkable.  6.  Atrophic pancreas with scattered small calcifications, findings consistent with chronic pancreatitis. No acute peripancreatic inflammation.  7.  Mild thickening of the urinary bladder, cystitis is possible. Recommend correlation with urinalysis.

## 2024-02-08 PROBLEM — I35.0 AORTIC VALVE STENOSIS, ETIOLOGY OF CARDIAC VALVE DISEASE UNSPECIFIED: Status: ACTIVE | Noted: 2017-05-23

## 2024-02-08 LAB
ANION GAP SERPL CALCULATED.3IONS-SCNC: 5 MMOL/L (ref 7–15)
BACTERIA UR CULT: NORMAL
BASE EXCESS BLDA CALC-SCNC: 8.5 MMOL/L (ref -3–3)
BASE EXCESS BLDV CALC-SCNC: 10.1 MMOL/L (ref -3–3)
BUN SERPL-MCNC: 32.4 MG/DL (ref 8–23)
CALCIUM SERPL-MCNC: 8.7 MG/DL (ref 8.8–10.2)
CHLORIDE SERPL-SCNC: 103 MMOL/L (ref 98–107)
CREAT SERPL-MCNC: 0.78 MG/DL (ref 0.51–0.95)
DEPRECATED HCO3 PLAS-SCNC: 34 MMOL/L (ref 22–29)
EGFRCR SERPLBLD CKD-EPI 2021: 79 ML/MIN/1.73M2
GLUCOSE BLDC GLUCOMTR-MCNC: 192 MG/DL (ref 70–99)
GLUCOSE BLDC GLUCOMTR-MCNC: 97 MG/DL (ref 70–99)
GLUCOSE BLDC GLUCOMTR-MCNC: 98 MG/DL (ref 70–99)
GLUCOSE SERPL-MCNC: 112 MG/DL (ref 70–99)
HCO3 BLDA-SCNC: 30 MMOL/L (ref 21–28)
HCO3 BLDV-SCNC: 31 MMOL/L (ref 21–28)
HOLD SPECIMEN: NORMAL
OXYHGB MFR BLDA: 94 % (ref 92–100)
OXYHGB MFR BLDV: 68 % (ref 70–75)
PCO2 BLDA: 61 MM HG (ref 35–45)
PCO2 BLDV: 65 MM HG (ref 40–50)
PH BLDA: 7.36 [PH] (ref 7.35–7.45)
PH BLDV: 7.35 [PH] (ref 7.32–7.43)
PO2 BLDA: 88 MM HG (ref 80–105)
PO2 BLDV: 40 MM HG (ref 25–47)
POTASSIUM SERPL-SCNC: 4.1 MMOL/L (ref 3.4–5.3)
SAO2 % BLDA: 95 % (ref 95–96)
SAO2 % BLDV: 69 % (ref 70–75)
SODIUM SERPL-SCNC: 142 MMOL/L (ref 135–145)

## 2024-02-08 PROCEDURE — 99221 1ST HOSP IP/OBS SF/LOW 40: CPT | Mod: GC | Performed by: THORACIC SURGERY (CARDIOTHORACIC VASCULAR SURGERY)

## 2024-02-08 PROCEDURE — 250N000011 HC RX IP 250 OP 636: Performed by: INTERNAL MEDICINE

## 2024-02-08 PROCEDURE — 250N000012 HC RX MED GY IP 250 OP 636 PS 637: Performed by: HOSPITALIST

## 2024-02-08 PROCEDURE — 210N000001 HC R&B IMCU HEART CARE

## 2024-02-08 PROCEDURE — 250N000009 HC RX 250: Performed by: INTERNAL MEDICINE

## 2024-02-08 PROCEDURE — 258N000003 HC RX IP 258 OP 636: Performed by: INTERNAL MEDICINE

## 2024-02-08 PROCEDURE — C1769 GUIDE WIRE: HCPCS | Performed by: INTERNAL MEDICINE

## 2024-02-08 PROCEDURE — 36415 COLL VENOUS BLD VENIPUNCTURE: CPT | Performed by: INTERNAL MEDICINE

## 2024-02-08 PROCEDURE — 999N000157 HC STATISTIC RCP TIME EA 10 MIN

## 2024-02-08 PROCEDURE — 250N000013 HC RX MED GY IP 250 OP 250 PS 637: Performed by: INTERNAL MEDICINE

## 2024-02-08 PROCEDURE — C1894 INTRO/SHEATH, NON-LASER: HCPCS | Performed by: INTERNAL MEDICINE

## 2024-02-08 PROCEDURE — 99232 SBSQ HOSP IP/OBS MODERATE 35: CPT | Performed by: HOSPITALIST

## 2024-02-08 PROCEDURE — 99152 MOD SED SAME PHYS/QHP 5/>YRS: CPT | Performed by: INTERNAL MEDICINE

## 2024-02-08 PROCEDURE — 99233 SBSQ HOSP IP/OBS HIGH 50: CPT | Mod: 25 | Performed by: INTERNAL MEDICINE

## 2024-02-08 PROCEDURE — 255N000002 HC RX 255 OP 636: Performed by: INTERNAL MEDICINE

## 2024-02-08 PROCEDURE — 82805 BLOOD GASES W/O2 SATURATION: CPT

## 2024-02-08 PROCEDURE — 272N000001 HC OR GENERAL SUPPLY STERILE: Performed by: INTERNAL MEDICINE

## 2024-02-08 PROCEDURE — 250N000011 HC RX IP 250 OP 636: Performed by: HOSPITALIST

## 2024-02-08 PROCEDURE — B2111ZZ FLUOROSCOPY OF MULTIPLE CORONARY ARTERIES USING LOW OSMOLAR CONTRAST: ICD-10-PCS | Performed by: INTERNAL MEDICINE

## 2024-02-08 PROCEDURE — 99153 MOD SED SAME PHYS/QHP EA: CPT | Performed by: INTERNAL MEDICINE

## 2024-02-08 PROCEDURE — 94640 AIRWAY INHALATION TREATMENT: CPT | Mod: 76

## 2024-02-08 PROCEDURE — 94640 AIRWAY INHALATION TREATMENT: CPT

## 2024-02-08 PROCEDURE — 4A023N8 MEASUREMENT OF CARDIAC SAMPLING AND PRESSURE, BILATERAL, PERCUTANEOUS APPROACH: ICD-10-PCS | Performed by: INTERNAL MEDICINE

## 2024-02-08 PROCEDURE — C1887 CATHETER, GUIDING: HCPCS | Performed by: INTERNAL MEDICINE

## 2024-02-08 PROCEDURE — 250N000013 HC RX MED GY IP 250 OP 250 PS 637: Performed by: HOSPITALIST

## 2024-02-08 PROCEDURE — 80048 BASIC METABOLIC PNL TOTAL CA: CPT | Performed by: INTERNAL MEDICINE

## 2024-02-08 PROCEDURE — 93460 R&L HRT ART/VENTRICLE ANGIO: CPT | Performed by: INTERNAL MEDICINE

## 2024-02-08 PROCEDURE — 93460 R&L HRT ART/VENTRICLE ANGIO: CPT | Mod: 26 | Performed by: INTERNAL MEDICINE

## 2024-02-08 PROCEDURE — C1725 CATH, TRANSLUMIN NON-LASER: HCPCS | Performed by: INTERNAL MEDICINE

## 2024-02-08 RX ORDER — NALOXONE HYDROCHLORIDE 0.4 MG/ML
0.2 INJECTION, SOLUTION INTRAMUSCULAR; INTRAVENOUS; SUBCUTANEOUS
Status: ACTIVE | OUTPATIENT
Start: 2024-02-08 | End: 2024-02-08

## 2024-02-08 RX ORDER — ATROPINE SULFATE 0.1 MG/ML
0.5 INJECTION INTRAVENOUS
Status: ACTIVE | OUTPATIENT
Start: 2024-02-08 | End: 2024-02-08

## 2024-02-08 RX ORDER — ACETAMINOPHEN 325 MG/1
650 TABLET ORAL EVERY 4 HOURS PRN
Status: DISCONTINUED | OUTPATIENT
Start: 2024-02-08 | End: 2024-02-10 | Stop reason: HOSPADM

## 2024-02-08 RX ORDER — OXYCODONE HYDROCHLORIDE 5 MG/1
10 TABLET ORAL EVERY 4 HOURS PRN
Status: DISCONTINUED | OUTPATIENT
Start: 2024-02-08 | End: 2024-02-10 | Stop reason: HOSPADM

## 2024-02-08 RX ORDER — NALOXONE HYDROCHLORIDE 0.4 MG/ML
0.4 INJECTION, SOLUTION INTRAMUSCULAR; INTRAVENOUS; SUBCUTANEOUS
Status: ACTIVE | OUTPATIENT
Start: 2024-02-08 | End: 2024-02-08

## 2024-02-08 RX ORDER — SODIUM CHLORIDE 9 MG/ML
75 INJECTION, SOLUTION INTRAVENOUS CONTINUOUS
Status: ACTIVE | OUTPATIENT
Start: 2024-02-08 | End: 2024-02-08

## 2024-02-08 RX ORDER — ONDANSETRON 4 MG/1
4 TABLET, FILM COATED ORAL EVERY 6 HOURS PRN
Status: DISCONTINUED | OUTPATIENT
Start: 2024-02-08 | End: 2024-02-10 | Stop reason: HOSPADM

## 2024-02-08 RX ORDER — ATORVASTATIN CALCIUM 40 MG/1
40 TABLET, FILM COATED ORAL AT BEDTIME
Status: DISCONTINUED | OUTPATIENT
Start: 2024-02-08 | End: 2024-02-10 | Stop reason: HOSPADM

## 2024-02-08 RX ORDER — HYDRALAZINE HYDROCHLORIDE 20 MG/ML
10 INJECTION INTRAMUSCULAR; INTRAVENOUS
Status: DISCONTINUED | OUTPATIENT
Start: 2024-02-08 | End: 2024-02-10 | Stop reason: HOSPADM

## 2024-02-08 RX ORDER — FENTANYL CITRATE 50 UG/ML
INJECTION, SOLUTION INTRAMUSCULAR; INTRAVENOUS
Status: DISCONTINUED | OUTPATIENT
Start: 2024-02-08 | End: 2024-02-08

## 2024-02-08 RX ORDER — IPRATROPIUM BROMIDE AND ALBUTEROL SULFATE 2.5; .5 MG/3ML; MG/3ML
3 SOLUTION RESPIRATORY (INHALATION)
Status: DISCONTINUED | OUTPATIENT
Start: 2024-02-09 | End: 2024-02-10

## 2024-02-08 RX ORDER — FENTANYL CITRATE 50 UG/ML
25 INJECTION, SOLUTION INTRAMUSCULAR; INTRAVENOUS
Status: DISCONTINUED | OUTPATIENT
Start: 2024-02-08 | End: 2024-02-09

## 2024-02-08 RX ORDER — FLUMAZENIL 0.1 MG/ML
0.2 INJECTION, SOLUTION INTRAVENOUS
Status: ACTIVE | OUTPATIENT
Start: 2024-02-08 | End: 2024-02-08

## 2024-02-08 RX ORDER — PROCHLORPERAZINE MALEATE 5 MG
5 TABLET ORAL EVERY 6 HOURS PRN
Status: DISCONTINUED | OUTPATIENT
Start: 2024-02-08 | End: 2024-02-10 | Stop reason: HOSPADM

## 2024-02-08 RX ORDER — ALBUTEROL SULFATE 0.83 MG/ML
2.5 SOLUTION RESPIRATORY (INHALATION) EVERY 4 HOURS PRN
Status: DISCONTINUED | OUTPATIENT
Start: 2024-02-08 | End: 2024-02-10

## 2024-02-08 RX ORDER — OXYCODONE HYDROCHLORIDE 5 MG/1
5 TABLET ORAL EVERY 4 HOURS PRN
Status: DISCONTINUED | OUTPATIENT
Start: 2024-02-08 | End: 2024-02-10 | Stop reason: HOSPADM

## 2024-02-08 RX ORDER — IODIXANOL 320 MG/ML
INJECTION, SOLUTION INTRAVASCULAR
Status: DISCONTINUED | OUTPATIENT
Start: 2024-02-08 | End: 2024-02-08

## 2024-02-08 RX ADMIN — PREDNISONE 40 MG: 20 TABLET ORAL at 07:47

## 2024-02-08 RX ADMIN — METOPROLOL TARTRATE 25 MG: 25 TABLET, FILM COATED ORAL at 07:47

## 2024-02-08 RX ADMIN — IPRATROPIUM BROMIDE AND ALBUTEROL SULFATE 3 ML: .5; 3 SOLUTION RESPIRATORY (INHALATION) at 19:55

## 2024-02-08 RX ADMIN — DIGOXIN 125 MCG: 125 TABLET ORAL at 07:50

## 2024-02-08 RX ADMIN — GABAPENTIN 600 MG: 300 CAPSULE ORAL at 21:13

## 2024-02-08 RX ADMIN — SUCRALFATE 1 G: 1 TABLET ORAL at 07:47

## 2024-02-08 RX ADMIN — ACETAMINOPHEN 650 MG: 325 TABLET ORAL at 12:11

## 2024-02-08 RX ADMIN — IPRATROPIUM BROMIDE AND ALBUTEROL SULFATE 3 ML: .5; 3 SOLUTION RESPIRATORY (INHALATION) at 11:52

## 2024-02-08 RX ADMIN — SUCRALFATE 1 G: 1 TABLET ORAL at 17:09

## 2024-02-08 RX ADMIN — ATORVASTATIN CALCIUM 40 MG: 40 TABLET, FILM COATED ORAL at 21:13

## 2024-02-08 RX ADMIN — CEFTRIAXONE SODIUM 1 G: 1 INJECTION, POWDER, FOR SOLUTION INTRAMUSCULAR; INTRAVENOUS at 17:09

## 2024-02-08 RX ADMIN — PROCHLORPERAZINE MALEATE 5 MG: 5 TABLET ORAL at 17:05

## 2024-02-08 RX ADMIN — GABAPENTIN 600 MG: 300 CAPSULE ORAL at 07:48

## 2024-02-08 RX ADMIN — SUCRALFATE 1 G: 1 TABLET ORAL at 10:38

## 2024-02-08 RX ADMIN — DILTIAZEM HYDROCHLORIDE 120 MG: 120 CAPSULE, COATED, EXTENDED RELEASE ORAL at 07:48

## 2024-02-08 RX ADMIN — PANTOPRAZOLE SODIUM 40 MG: 40 TABLET, DELAYED RELEASE ORAL at 17:09

## 2024-02-08 RX ADMIN — PANTOPRAZOLE SODIUM 40 MG: 40 TABLET, DELAYED RELEASE ORAL at 07:47

## 2024-02-08 RX ADMIN — INSULIN ASPART 1 UNITS: 100 INJECTION, SOLUTION INTRAVENOUS; SUBCUTANEOUS at 17:07

## 2024-02-08 RX ADMIN — ASPIRIN 325 MG ORAL TABLET 325 MG: 325 PILL ORAL at 06:10

## 2024-02-08 RX ADMIN — METOPROLOL TARTRATE 25 MG: 25 TABLET, FILM COATED ORAL at 21:13

## 2024-02-08 RX ADMIN — SODIUM CHLORIDE 75 ML/HR: 9 INJECTION, SOLUTION INTRAVENOUS at 10:28

## 2024-02-08 RX ADMIN — IPRATROPIUM BROMIDE AND ALBUTEROL SULFATE 3 ML: .5; 3 SOLUTION RESPIRATORY (INHALATION) at 15:39

## 2024-02-08 RX ADMIN — SUCRALFATE 1 G: 1 TABLET ORAL at 21:13

## 2024-02-08 RX ADMIN — GABAPENTIN 600 MG: 300 CAPSULE ORAL at 12:11

## 2024-02-08 RX ADMIN — ONDANSETRON HYDROCHLORIDE 4 MG: 4 TABLET, FILM COATED ORAL at 13:31

## 2024-02-08 ASSESSMENT — ACTIVITIES OF DAILY LIVING (ADL)
ADLS_ACUITY_SCORE: 30
ADLS_ACUITY_SCORE: 30
ADLS_ACUITY_SCORE: 33
ADLS_ACUITY_SCORE: 30
ADLS_ACUITY_SCORE: 30
ADLS_ACUITY_SCORE: 33
ADLS_ACUITY_SCORE: 30
ADLS_ACUITY_SCORE: 33
ADLS_ACUITY_SCORE: 33
ADLS_ACUITY_SCORE: 30
ADLS_ACUITY_SCORE: 33
ADLS_ACUITY_SCORE: 33

## 2024-02-08 ASSESSMENT — EJECTION FRACTION: EF_VALUE: .32

## 2024-02-08 NOTE — PROGRESS NOTES
Care Management Follow Up    Length of Stay (days): 2    Expected Discharge Date: 02/10/2024     Concerns to be Addressed: discharge planning     Patient plan of care discussed at interdisciplinary rounds: Yes    Anticipated Discharge Disposition:  Home with son, has  PCA     Anticipated Discharge Services:  per team  Anticipated Discharge DME:  Pt on oxygen from [facility]     Patient/family educated on Medicare website which has current facility and service quality ratings:    Education Provided on the Discharge Plan:  yes  Patient/Family in Agreement with the Plan:      Referrals Placed by CM/SW:    Private pay costs discussed: Not applicable    Additional Information:  Pt had angio today, SW and SW intern met with pt in pt room, introduced self and role. Pt PCA/sons girlfriend Cynthia was present. Cynthia provides 2 1/4 hours of PCA services a day and also has 3 hours a week of housekeeping services, Cynthia, pt son and pt all live together. Pt states her bathroom is next to her room, and she has a door to a deck closeby to go outside. Pt states she does well at home.   Pt family will transport her at discharge.     TUNDE Carreno      aerobic capacity/endurance/gait, locomotion, and balance/muscle strength

## 2024-02-08 NOTE — CONSULTS
Cardiac Surgery   Consult Note     Mary Kay Tejada  Code Status: Full Code  Primary Care Physician: Cammy Bui   : 1950   Age: 74 year old     Date of Service: 2024     Subjective     Chief Complaint: Shortness of Breath    History of present illness:     75 y/o F who was being worked up for TAVR as outpatient who presented to the hospital with worsening shortness of breath in the setting of using 3L NC at home at baseline. She was noted to have pleural effusion that was tapped. She feels better now. She notes that she lives with her son but is barely able to ambulate at all. She could make it to the restroom but only while using support of nearby furniture and she would not be able to walk up a flight of stairs. She is afebrile and feels weak right now but is otherwise at her baseline status.     Subjective     Patient Active Problem List   Diagnosis    Abnormal Weight Loss    Osteoarthritis Of The Shoulder    Chronic Constipation    Onychomycosis Of The Toenails    Diarrhea    Ganglion Of The Left Wrist    Larynx Edema    Obesity    Medial Epicondylitis    Skin Lesion    Urinary Incontinence    Moderate episode of recurrent major depressive disorder (H)    Dry Eye Syndrome    Nicotine Dependence    Hypokalemia    Essential hypertension    Muscle Cramps In The Calf    Edema    Atrial flutter (H)    Lump In / On The Skin    Chronic pain syndrome    Paroxysmal Atrial Fibrillation    Fibromyalgia    Nausea    Abdominal Pain    Peptic Ulcer    Mixed hyperlipidemia    Chronic Reflux Esophagitis    Benign Adenomatous Polyp Of The Large Intestine    Vertigo    Rotator cuff tendonitis    Generalized osteoarthrosis, involving multiple sites    Tendonitis of wrist, left    Trigger point of thoracic region    Flashers or floaters of right eye    Menopause    Tendonitis of wrist, right    Skin lesion of face    Hematoma of abdominal wall    Headache    Cervical neck pain with evidence of disc  disease    Controlled substance agreement signed    Aspiration pneumonia (H)    Type 2 diabetes mellitus with hypoglycemia (H)    Candidal intertrigo    Osteoarthritis, hip, bilateral    Primary osteoarthritis of left knee    Osteoarthritis of both knees    Syncope    Opacity of lung on imaging study    Acute gastritis    Gastroenteritis    Aortic valve stenosis, etiology of cardiac valve disease unspecified    Bunion, left    Callus of foot    Cataract    Chronic anemia    Anemia due to blood loss, acute    Diabetic polyneuropathy associated with type 2 diabetes mellitus (H)    Upper GI bleed    Melena    Dyslipidemia    Skin lesion    Mixed stress and urge urinary incontinence    Primary osteoarthritis of both knees    Advanced directives, counseling/discussion    Chronic gastric ulcer without hemorrhage and without perforation    Atrial fibrillation, unspecified type (H)    Nicotine dependence    COPD exacerbation (H)    Acute on chronic respiratory failure with hypoxia (H)    Pneumonia of right lower lobe due to infectious organism    Dyspnea, unspecified type    Acute and chronic respiratory failure with hypercapnia (H)    Hypoxia    SOB (shortness of breath)    Synovial cyst of knee, unspecified laterality    Atrial flutter, unspecified type (H)    Hypotension, unspecified hypotension type    Pleural effusion    Acute on chronic diastolic heart failure (H)    Community acquired pneumonia    Pulmonary emphysema, unspecified emphysema type (H)    Protein-calorie malnutrition (H24)    F11.2 - Continuous opioid dependence (H)    Dizziness    Generalized abdominal pain     Past Medical History:   Diagnosis Date    Anemia     Aortic stenosis     Atrial fibrillation (H)     Atrial flutter (H)     Benign neoplasm of adenomatous polyp     large intestine     Chronic constipation     Chronic pain syndrome     COPD (chronic obstructive pulmonary disease) (H)     Oxygen at night     Dependence on supplemental oxygen      Oxygen at noc, during the day as needed    Depression     Diabetes mellitus (H)     Dry eye syndrome     Fibromyalgia     Ganglion     left wrist    GERD (gastroesophageal reflux disease)     Hyperlipidemia     Hypertension     Hypokalemia     Infective otitis externa, unspecified     Created by Conversion     Larynx edema     Lung disease     Malignant neoplasm of vulva (H)     Created by Conversion [facility] Annotation: Apr 17 2007  8:24AM - Pattimiguel aYusufe:  resection per Dr. Alfonso Mane 9/06;  Replacement Utility updated for latest IMO load    Medial epicondylitis     Onychomycosis     Osteoarthritis     Peptic ulcer     Polyneuropathy     Vulvar malignant neoplasm (H)      Past Surgical History:   Procedure Laterality Date    BIOPSY BREAST Right     BIOPSY BREAST Right 01/28/2015    BIOPSY BREAST Right 1/28/2015    Procedure: RIGHT BREAST BIOPSY AFTER WIRE LOCALIZATION AT 0940;  Surgeon: Renée Soriano MD;  Location: [facility];  Service:     BIOPSY OF BREAST, INCISIONAL      Description: Incisional Breast Biopsy;  Recorded: 11/13/2007;  Comments: benign    COLONOSCOPY N/A 6/14/2019    Procedure: COLONOSCOPY;  Surgeon: Eduardo Mora MD;  Location: [facility];  Service: Gastroenterology    ESOPHAGOSCOPY, GASTROSCOPY, DUODENOSCOPY (EGD), COMBINED N/A 11/6/2018    Procedure: ESOPHAGOGASTRODUODENOSCOPY;  Surgeon: Lit Fernando MD;  Location: [facility];  Service:     HYSTERECTOMY      JOINT REPLACEMENT Left     TKA    PICC TRIPLE LUMEN PLACEMENT  1/12/2023         MD ABLATE HEART DYSRHYTHM FOCUS      Description: Catheter Ablation Atrial Fibrillation;  Recorded: 07/31/2012;  Comments: 7/24/12 PVI with Dr. Bansal to all 5 pulm veins and CTI fl ablation line as well.    ZC SUPRACERV ABD HYSTERECTOMY      Description: Supracervical Hysterectomy;  Proc Date: 01/01/1985;  Comments: some cervix left!; ovaries intact; done for bleeding     Social History      Socioeconomic History    Marital status:      Spouse name: Not on file    Number of children: Not on file    Years of education: Not on file    Highest education level: Not on file   Occupational History    Not on file   Tobacco Use    Smoking status: Some Days     Packs/day: .25     Types: Cigarettes     Passive exposure: Never    Smokeless tobacco: Never    Tobacco comments:     seen by TTS inpatient on 3/31/22   Vaping Use    Vaping Use: Never used   Substance and Sexual Activity    Alcohol use: Yes     Comment: Alcoholic Drinks/day: very little    Drug use: No    Sexual activity: Not on file   Other Topics Concern    Not on file   Social History Narrative    Not on file     Social Determinants of Health     Financial Resource Strain: Low Risk  (12/26/2023)    Financial Resource Strain     Within the past 12 months, have you or your family members you live with been unable to get utilities (heat, electricity) when it was really needed?: No   Food Insecurity: Low Risk  (12/26/2023)    Food Insecurity     Within the past 12 months, did you worry that your food would run out before you got money to buy more?: No     Within the past 12 months, did the food you bought just not last and you didn t have money to get more?: No   Transportation Needs: Low Risk  (12/26/2023)    Transportation Needs     Within the past 12 months, has lack of transportation kept you from medical appointments, getting your medicines, non-medical meetings or appointments, work, or from getting things that you need?: No   Physical Activity: Not on file   Stress: Not on file   Social Connections: Not on file   Interpersonal Safety: Low Risk  (9/20/2023)    Interpersonal Safety     Do you feel physically and emotionally safe where you currently live?: Yes     Within the past 12 months, have you been hit, slapped, kicked or otherwise physically hurt by someone?: No     Within the past 12 months, have you been humiliated or emotionally  abused in other ways by your partner or ex-partner?: No   Housing Stability: Low Risk  (12/26/2023)    Housing Stability     Do you have housing? : Yes     Are you worried about losing your housing?: No     Family History   Problem Relation Age of Onset    Heart Failure Mother     Cancer Other         paternal HX-laryngeal     Alcoholism Sister     No Known Problems Daughter     No Known Problems Maternal Grandmother     No Known Problems Maternal Grandfather     No Known Problems Paternal Grandmother     No Known Problems Paternal Grandfather     No Known Problems Maternal Aunt     No Known Problems Paternal Aunt     Alcoholism Sister     Alcoholism Brother     Alcoholism Father     Cancer Paternal Uncle         Gastric-Alcohol    Cancer Paternal Uncle         gastric-Alcohol    Hereditary Breast and Ovarian Cancer Syndrome No family hx of     Breast Cancer No family hx of     Colon Cancer No family hx of     Endometrial Cancer No family hx of     Ovarian Cancer No family hx of      Medications Prior to Admission   Medication Sig Dispense Refill Last Dose    albuterol (PROAIR HFA/PROVENTIL HFA/VENTOLIN HFA) 108 (90 Base) MCG/ACT inhaler INHALE 2 PUFFS INTO THE LUNGS EVERY 4 HOURS AS NEEDED FOR WHEEZING 13.4 g 3 Unknown at prn    apixaban ANTICOAGULANT (ELIQUIS) 5 MG tablet Take 1 tablet (5 mg) by mouth 2 times daily 180 tablet 2 Unknown at am    atorvastatin (LIPITOR) 20 MG tablet TAKE 1 TABLET(20 MG) BY MOUTH AT BEDTIME 90 tablet 3 2/6/2024    digoxin (LANOXIN) 125 MCG tablet TAKE 1 TABLET(125 MCG) BY MOUTH DAILY 90 tablet 2 2/6/2024    diltiazem ER COATED BEADS (CARDIZEM CD/CARTIA XT) 120 MG 24 hr capsule TAKE 1 CAPSULE(120 MG) BY MOUTH DAILY 90 capsule 2 2/6/2024    furosemide (LASIX) 20 MG tablet TAKE 1 TABLET(20 MG) BY MOUTH TWICE DAILY 180 tablet 3 Unknown    gabapentin (NEURONTIN) 600 MG tablet TAKE 1 TABLET(600 MG) BY MOUTH THREE TIMES DAILY 270 tablet 2 2/6/2024 at am    ipratropium - albuterol 0.5 mg/2.5  "mg/3 mL (DUONEB) 0.5-2.5 (3) MG/3ML neb solution Take 1 vial (3 mLs) by nebulization every 6 hours as needed for shortness of breath or wheezing 90 mL 0 Unknown at prn    JARDIANCE 10 MG TABS tablet TAKE 1 TABLET(10 MG) BY MOUTH DAILY 90 tablet 2 2/6/2024 at am    magnesium oxide (MAG-OX) 400 MG tablet Take 1 tablet (400 mg) by mouth daily 30 tablet 0 2/6/2024 at am    meclizine (ANTIVERT) 25 MG tablet TAKE 1 TABLET(25 MG) BY MOUTH THREE TIMES DAILY AS NEEDED FOR DIZZINESS OR NAUSEA 45 tablet 5 Unknown at prn    metoprolol tartrate (LOPRESSOR) 25 MG tablet TAKE 1 TABLET(25 MG) BY MOUTH TWICE DAILY 180 tablet 2 2/6/2024    naloxone (NARCAN) 4 MG/0.1ML nasal spray Spray 1 spray (4 mg) into one nostril alternating nostrils once as needed for opioid reversal every 2-3 minutes until assistance arrives 0.2 mL 0 Unknown at prn    nystatin (NYSTOP) 663494 UNIT/GM external powder APPLY TOPICALLY TO THE AFFECTED AREA 2-3 TIMES DAILY AS NEEDED 60 g 3 Unknown at prn    omeprazole (PRILOSEC) 20 MG DR capsule TAKE 1 CAPSULE(20 MG) BY MOUTH TWICE DAILY 180 capsule 1 2/6/2024    oxyCODONE IR (ROXICODONE) 10 MG tablet Take 1 tablet (10 mg) by mouth every 6 hours as needed for moderate to severe pain 120 tablet 0 Unknown at prn    potassium chloride ER (KLOR-CON M) 20 MEQ CR tablet TAKE 1 TABLET(20 MEQ) BY MOUTH DAILY 90 tablet 3 2/6/2024 at am    sucralfate (CARAFATE) 1 GM tablet CRUSH ONE TABLET AND MIX WITH A LLITTLE WATER AND SWALLOW FOUR TIMES DAILY 360 tablet 3 2/6/2024 at am    SYMBICORT 160-4.5 MCG/ACT Inhaler INHALE 2 PUFFS INTO THE LUNGS TWICE DAILY 10.2 g 4 2/6/2024 at am    ACCU-CHEK SOFTCLIX LANCETS lancets [ACCU-CHEK SOFTCLIX LANCETS LANCETS] TEST THREE TIMES DAILY 300 each 3     BD ULTRA-FINE SHORT PEN NEEDLE 31 gauge x 5/16\" Ndle [BD ULTRA-FINE SHORT PEN NEEDLE 31 GAUGE X 5/16\" NDLE] TEST FOUR TIMES DAILY WITH MEALS AND AT BEDTIME 400 each 3     blood-glucose meter (ONETOUCH VERIO IQ METER) Misc [BLOOD-GLUCOSE METER " "(ONETOUCH VERIO IQ METER) MISC] Check blood sugar three times a day. 1 each 0     diaper,brief,adult,disposable (ADULT BRIEFS - LARGE) Misc [DIAPER,BRIEF,ADULT,DISPOSABLE (ADULT BRIEFS - LARGE) MISC] Use 3-4 daily as needed for incontinence 120 each 6     Eucerin external lotion Use liberally to dry skin BID and prn. 480 mL 3     generic lancets (FINGERSTIX LANCETS) [GENERIC LANCETS (FINGERSTIX LANCETS)] Dispense brand per patient's insurance at pharmacy discretion. 300 each 0     Nutritional Supplements (ENSURE COMPLETE) LIQD Take 1 Can by mouth daily 7110 mL 4     ONETOUCH VERIO IQ test strip TEST THREE TIMES DAILY 300 strip 0      Allergies   Allergen Reactions    Celebrex [Celecoxib] Rash     patient had butterfly rash - \"lupus-like\"      Latex Rash          Objective   Objective   /55   Pulse 60   Temp 98.1  F (36.7  C) (Oral)   Resp 22   Ht 1.651 m (5' 5\")   Wt 58.1 kg (128 lb)   LMP  (LMP Unknown)   SpO2 95%   BMI 21.30 kg/m      Temp  Min: 97.2  F (36.2  C)  Max: 98.2  F (36.8  C)  BP  Min: 90/50  Max: 120/54     Intake/Output Summary (Last 24 hours) at 2/8/2024 1348  Last data filed at 2/8/2024 1200  Gross per 24 hour   Intake 715 ml   Output 1550 ml   Net -835 ml     I/O last 3 completed shifts:  In: -   Out: 1050 [Urine:1050]     Physical Exam:   Gen- alert and oriented x3, NAD, cachectic   HEENT- NC/AT, EOMI  Cardiac- RRR  Pulm- normal respiratory effort, on 4L NC  Abd- soft, NT/ND, no rebound or guarding  - deferred  Extremities- no peripheral edema  Neuro- gross motor intact  Skin- no obvious rashes, bruises, or cuts, very thin skin     Pertinent Labs: Reviewed.     Arterial Blood Gases   Recent Labs   Lab 02/08/24  0929   PH 7.36   PCO2 61*   PO2 88   HCO3 30*       Complete Blood Count   Recent Labs   Lab 02/07/24  0848 02/06/24  1953   WBC 4.0 5.2   HGB 13.9 14.0    151       Basic Metabolic Panel  Recent Labs   Lab 02/08/24  1103 02/07/24  0848 02/06/24 1953    137 140 "   POTASSIUM 4.1 4.3 4.0   CHLORIDE 103 96* 99   CO2 34* 28 30*   BUN 32.4* 24.9* 21.4   CR 0.78 0.78 0.78       Liver Function Tests  Recent Labs   Lab 02/06/24 1953   AST 20   ALT 13   ALKPHOS 88   BILITOTAL 0.6   ALBUMIN 3.6       Coagulation Profile  No lab results found in last 7 days.       Pertinent Imaging (Last 24 hours):  Recent Results (from the past 24 hour(s))   US Thoracentesis    Narrative    EXAM:   1. RIGHT THORACENTESIS  2. ULTRASOUND GUIDANCE  LOCATION: [facility]  DATE: 2/7/2024    INDICATION: Pleural effusion.    PROCEDURE: Informed consent obtained. Time out performed. The chest was prepped and draped in sterile fashion. 10 mL of 1 % lidocaine was infused into the local soft tissues. Under direct ultrasound guidance, a 5 Syrian catheter system was placed into   the pleural effusion.     1.05 liters of clear yellow fluid were removed and sent to lab, if requested.    Patient tolerated procedure well.    Ultrasound imaging was obtained and placed in the patient's permanent medical record.      Impression    IMPRESSION:  Status post right ultrasound-guided thoracentesis.    Reference CPT Code: 24252   Cardiac Catheterization    Addendum: 2/8/2024    Mary Kay Tejada is a 74 year old old female with severe aortic stenosis, AF/AFL, some CAD on CTA, HL, COPD, anemia who is here for LHC/RHC in the setting of admission for ADHF, respiratory failure that improved after thoracentesis.    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), low-normal CO/CI by Ronit  - continue ASA 81mg daily indefinitely, clopidogrel 600mg once, followed by 75mg daily for at least 12 months   - proceed w/ TAVR w/up as planned  - continue aggressive risk factor modification          Findings:  LM:no obstruction   LAD:mid-vessel 40% narrowing, D1 w/ 40% ostial narrowing  Lcx:mildly irregular  RCA:dominant, mildly  irregular    LVEDP:16  AV gradient (mean) by pullback): 23    RHC:  RA:7   RV:55/4  PA:58/18 (33) (28)  PCWP:unable to wedge  SvO2:66.5      CO/CI:  Ronit:4.43/2.15    Access:  R Radial artery  R brachial vein switched to R Femoral vein     Closure:   Vasc Band  Manual              Narrative    Images from the original result were not included.  Mary Kay Tejada is a 74 year old old female with severe aortic stenosis,   AF/AFL, some CAD on CTA, HL, COPD, anemia who is here for LHC/RHC in the   setting of admission for ADHF, respiratory failure that improved after   thoracentesis.    - normal R- and L-sided filling pressures, elevated PA pressures c/w   mod-severe pulmonary arterial HTN (due to lung disease), normal CO/CI by   Ronit  - continue ASA 81mg daily indefinitely, clopidogrel 600mg once, followed   by 75mg daily for at least 12 months   - proceed w/ TAVR w/up as planned  - continue aggressive risk factor modification          Findings:  LM:no obstruction   LAD:mid-vessel 40% narrowing, D1 w/ 40% ostial narrowing  Lcx:mildly irregular  RCA:dominant, mildly irregular    LVEDP:16  AV gradient (mean) by pullback): 23    RHC:  RA:7   RV:55/4  PA:58/18 (33) (28)  PCWP:unable to wedge  SvO2:66.5      CO/CI:  Ronit:4.43/2.15    Access:  R Radial artery  R brachial vein switched to R Femoral vein     Closure:   Vasc Band  Manual             Last Echo:  Echo result w/o MOPS: Interpretation Summary Compared to the prior study dated 1/11/23, there are changes as noted. Aorticstenosis is now severe.The left ventricle is normal in size. Left ventricular function is normal.Theejection fraction is 60-65%.The right ventricle is mildly dilated.The left atrium is mildly dilated.Severe valvular aortic stenosis. There is mild to moderate (1-2+) aorticregurgitation.Small pericardial effusion                Current Medications     S  C  H  E  D  [Held by provider] apixaban ANTICOAGULANT  5 mg Oral BID    atorvastatin  40 mg Oral At  Bedtime    cefTRIAXone  1 g Intravenous Q24H    digoxin  125 mcg Oral Daily    diltiazem ER COATED BEADS  120 mg Oral Daily    fluticasone-vilanterol  1 puff Inhalation Daily    gabapentin  600 mg Oral TID    insulin aspart   Subcutaneous TID w/meals    insulin aspart  1-7 Units Subcutaneous TID AC    insulin aspart  1-5 Units Subcutaneous At Bedtime    ipratropium - albuterol 0.5 mg/2.5 mg/3 mL  3 mL Nebulization Q4H    metoprolol tartrate  25 mg Oral BID    pantoprazole  40 mg Oral BID AC    predniSONE  40 mg Oral Daily    sodium chloride (PF)  3 mL Intracatheter Q8H    sucralfate  1 g Oral 4x Daily AC & HS      G  T  T  - MEDICATION INSTRUCTIONS -      sodium chloride 75 mL/hr (02/08/24 1200)      P  R  N acetaminophen, albuterol, atropine, calcium carbonate, glucose **OR** dextrose **OR** glucagon, fentaNYL, flumazenil, HOLD MEDICATION, hydrALAZINE, ipratropium - albuterol 0.5 mg/2.5 mg/3 mL, lidocaine 4%, lidocaine (buffered or not buffered), midazolam, naloxone **OR** naloxone **OR** naloxone **OR** naloxone, ondansetron **OR** prochlorperazine, oxyCODONE **OR** oxyCODONE, - MEDICATION INSTRUCTIONS -, senna-docusate **OR** senna-docusate, sodium chloride (PF), sodium chloride 0.9%         Assessment     74 year old female with severe aortic stenosis     Plan     - not a surgical candidate given severe debility  - agree with plan to proceed with TAVR when deemed appropriate  - rest of care per primary team, please call with any questions or concerns    This plan was discussed with Dr. Bray who agrees.       Signed:    Gurmeet Soriano MD 2/8/2024 at 1:48 PM  Cardiothoracic Surgery Fellow  Pager: [phone]

## 2024-02-08 NOTE — PROGRESS NOTES
"[facility] Hospitalist Progress Note  [facility]  Admission date: 2/6/2024    Summary:    74F with hx of COPD on O2 at home(2.5 liters),active smoker, hx of severe aortic stenosis (in process of TAVR work-up but delayed due to anxiety) hx of CAD, Afib/Flutter, who comes in with dizziness, sob, and lower abd pain in the setting of some dietary indiscretion.    CONTE is likely multifactorial from COPD exacerbation (more purulent sputum in the last few weeks), along with large R pleural effusion in the setting of severe aortic stenosis.  Symptoms improved after thoracentesis of 1L.    Underwent R&LHC in work-up for TAVR with normal filling pressures and non-obstructive CAD.    Assessment/Plan    #COPD exacerbation with chronic hypoxic respiratory failure - no wheezing today.    -Ceftriaxone and pred 40mg x 5 days  -Bronchodilators.    #Recurrent R pleural effusion - exudative in past.  Likely diuresed HF effusion.    -Agree would be hard to diurese this off with orthostatic symptoms and underwent thora 02/0 for 1L.  -fluid is exudative by Lights again.  Gram stain negative and cultures NGTD.  Cytology pending.     #Severe aortic stenosis - follow-up with valve clinic for TAVR    #pulm HTN due to pulmonary disease     #tobacco abuse -1 ppd. declined patch, not interested in stopping     #hx of Afib/flutter - toprol, dig, dilt, eliquis (held for angio, ikely resume tomorrow)    #non obstructive cad - eliquis, statin.  Defer to cards if needs anti-platelet with this as well.  Wouldn't treat with \"triple therapy\".     #Deconditioning  -having difficult time getting around due to CONTE, suspect copd advance, and valve issues  -high risk falls--had fall at home recently  -fall precautions     #DM  -was told to hold her po meds in anticipation for TAVR  -here cover with insulin carb count and scale     #Chronic pain, fibromyalgia  -on oxy     #Hx of mood d/o       Checklist:  Code Status: Full Code    Diet: Fluid " "restriction 1800 ML FLUID  Advance Diet as Tolerated: Clear Liquid Diet     DVT px:  DOAC    Disposition and Discharge Planning  Auto-populated from discharge tab:     Expected Discharge Date: 02/09/2024      Destination: home with family;home with help/services  Discharge Comments: angio 2/8         System Identified Risk Variables  Auto-populated based on system request - if needs to be addressed in treatment plan they will be addressed above:  \"  Clinically Significant Risk Factors                  # Hypertension: Noted on problem list                   \"    Anticipate discharge tomorrow.    Interval Events/Subjective/Notable results:    SOB improved today.  No complaints.    Reviewed: BMP, angiography    Objective    Vital signs in last 24 hours  Temp:  [97.2  F (36.2  C)-98.2  F (36.8  C)] 98.1  F (36.7  C)  Pulse:  [53-88] 55  Resp:  [15-22] 22  BP: ()/(50-72) 106/55  SpO2:  [90 %-98 %] 96 % O2 Device: Oxymask    Weight:   128 lbs 0 oz  Body mass index is 21.3 kg/m .    Intake/Output last 3 shifts  I/O last 3 completed shifts:  In: 715 [P.O.:600; I.V.:115]  Out: 1550 [Urine:1550]    Physical Exam  General:  Alert, cooperative, no distress, chronically ill appearing    Neurologic:  oriented, facial symmetry preserved, fluent speech.   Psych: calm  HEENT:  Anicteric, MMM, nasal cannula  CV: RRR, no edema, vascular access sites in wrist and groin without hematoma or bleeding when I saw earlier.  Lungs:  no wheezing.  Decreased at R base.  Easyrespirations  Abd: softly distended, NT  Skin: no rashes noted on exposed skin.    Chairez Catheter: Not present  Lines: None          Medical Decision Making               Edgar Wood MD, [facility]  Internal Medicine Hospitalist    "

## 2024-02-08 NOTE — PLAN OF CARE
Problem: Gas Exchange Impaired  Goal: Optimal Gas Exchange  Outcome: Progressing   LS diminished throughout all lobes; requiring oxygen at 4 lpm/nc to maintain saturations in the low 90's.  Pt mildly dyspneic upon exertion, recovers fast however.  Chronically on oxygen at 2.5 lpm/nc baseline.     Overnight; able to wean oxygen down to 3 lpm/nc for saturations in the mid to high 90's.    A&O x :4     Activity:SBA to assist of 1 with gait belt. Bed alarm active; pt recently fell at home.      Cardiac Rhythm: NSR      Pain:Reports mild R-mid back discomfort r/t thoracentesis; declined intervention.     Plan:Pt NPO at 0000 for Coronary angiogram.  Pt has been prepped for the procedure. Handout and education reviewed with pt at the bedside.

## 2024-02-08 NOTE — PRE-PROCEDURE
GENERAL PRE-PROCEDURE:   Procedure:  Coronary angiogram with possible PCI, right heart catheterization  Date/Time:  2/8/2024 8:29 AM    Written consent obtained?: Yes    Risks and benefits: Risks, benefits and alternatives were discussed    Consent given by:  Patient  Patient states understanding of procedure being performed: Yes    Patient's understanding of procedure matches consent: Yes    Procedure consent matches procedure scheduled: Yes    Expected level of sedation:  Moderate  Appropriately NPO:  Yes  ASA Class:  4 (Severe aortic stenosis, AF/AFL; on NOAC, CAD, COPD, tobacco use disorder, DM type II)  Mallampati  :  Grade 1- soft palate, uvula, tonsillar pillars, and posterior pharyngeal wall visible  Lungs:  Lungs clear with good breath sounds bilaterally  Heart:  Systolic murmur and a-fib  History & Physical reviewed:  History and physical reviewed and updates made (see comment)  H&P Comments:  Clinically Significant Risk Factors Present on Admission    Cardiovascular : Severe aortic stenosis, AF/AFL; on NOAC, CAD, DM type II    Fluid & Electrolyte Disorders : Not present on admission    Gastroenterology : Not present on admission    Hematology/Oncology : Not present on admission    Nephrology : Not present on admission    Neurology : Not present on admission    Pulmonology : COPD, tobacco use disorder    Systemic : Not present on admission    Statement of review:  I have reviewed the lab findings, diagnostic data, medications, and the plan for sedation

## 2024-02-08 NOTE — PLAN OF CARE
Problem: Adult Inpatient Plan of Care  Goal: Absence of Hospital-Acquired Illness or Injury  Intervention: Prevent Skin Injury  Recent Flowsheet Documentation  Taken 2/7/2024 0910 by Ana Portillo, RN  Body Position: position changed independently   Goal Outcome Evaluation: Pt alert and oriented, denied pain, assist of one to the bathroom, on 2.5 liters oxygen , o2 SATs between low 88% to 94% high, 2 PIV saline lock, Lab called for gram stain result negative, MD notified. Pt NPO at midnight, Pt transferring to p3, report given to p3 Nurse. Belongings with the patient.

## 2024-02-08 NOTE — PROGRESS NOTES
HEART CARE NOTE          Assessment/Recommendations   1.  Severe aortic stenosis  Assessment / Plan  Significant improvement in respiratory status s/p thoracentesis - continue to hold further diuresis   Patient is high risk of adverse cardiac events 2/2 advanced age, severe valvular disease, CAD, DM2     2. Afib/flutter  Assessment / Plan  Continue digoxin, metoprolol, apixaban     3. CAD  Assessment / Plan  Denies chest pain or anginal equivalents   Coronary angiogram scheduled - will proceed today; I discussed this with him including potential risk of stroke, myocardial infarction, or death.  We also discussed the possibility of vascular injury, bleeding requiring blood transfusion, or reaction to x-ray     4. HLP  Assessment / Plan  Resume atorvastatin     5. COPD  Assessment / Plan  Management and supportive care per primary team     6. DM2  Assessment / Plan  Management per primary team  Currently on ISS      History of Present Illness/Subjective    Ms. Mary Kay Tejada is a 74 year old female with a PMHx significant for (per Epic notation) COPD on O2 at home(2.5 liters),active smoker, hx of severe aortic stenosis(was to have TAVR this Thursday, hx of CAD, Afib/Flutter, who comes in with dizziness, sob, and lower abd pain concerning for cystitis      Today, Mrs. Tejada is without acute cardiac events or complaints; Management plan as detailed above     ECG: Personally reviewed. nonspecific ST and T waves changes, sinus bradycardia, left axis deviation.     ECHO (personnaly Reviewed on 2/7/24):   Compared to the prior study dated 1/11/23, there are changes as noted. Aortic  stenosis is now severe.  The left ventricle is normal in size. Left ventricular function is normal.The  ejection fraction is 60-65%.  The right ventricle is mildly dilated.  The left atrium is mildly dilated.  Severe valvular aortic stenosis. There is mild to moderate (1-2+) aortic  regurgitation.  Small pericardial effusion    Telemetry:  "personally reviewed February 8, 2024; notable for sinus rhythm     Medical history and pertinent documents reviewed in Care Everywhere please where applicable see details above        Physical Examination Review of Systems   BP 90/50 (BP Location: Right arm)   Pulse 62   Temp 97.2  F (36.2  C) (Oral)   Resp 18   Ht 1.651 m (5' 5\")   Wt 58.1 kg (128 lb)   LMP  (LMP Unknown)   SpO2 98%   BMI 21.30 kg/m    Body mass index is 21.3 kg/m .  Wt Readings from Last 3 Encounters:   02/08/24 58.1 kg (128 lb)   01/29/24 54.9 kg (121 lb)   09/20/23 54.9 kg (121 lb)     General Appearance:   no distress, normal body habitus   ENT/Mouth: membranes moist, no oral lesions or bleeding gums.      EYES:  no scleral icterus, normal conjunctivae   Neck: no carotid bruits or thyromegaly   Chest/Lungs:   lungs are clear to auscultation, no rales or wheezing, equal chest wall expansion    Cardiovascular:   Regular. Normal first and second heart sounds with + FITO; no rubs, or gallops; the carotid, radial and posterior tibial pulses are intact, no JVD or LE edema bilaterally    Abdomen:  no organomegaly, masses, bruits, or tenderness; bowel sounds are present   Extremities: no cyanosis or clubbing   Skin: no xanthelasma, warm.    Neurologic: NAD     Psychiatric: NAD     A complete 10 systems ROS was reviewed  And is negative except what is listed in the HPI.          Medical History  Surgical History Family History Social History   Past Medical History:   Diagnosis Date    Anemia     Aortic stenosis     Atrial fibrillation (H)     Atrial flutter (H)     Benign neoplasm of adenomatous polyp     large intestine     Chronic constipation     Chronic pain syndrome     COPD (chronic obstructive pulmonary disease) (H)     Oxygen at night     Dependence on supplemental oxygen     Oxygen at noc, during the day as needed    Depression     Diabetes mellitus (H)     Dry eye syndrome     Fibromyalgia     Ganglion     left wrist    GERD " (gastroesophageal reflux disease)     Hyperlipidemia     Hypertension     Hypokalemia     Infective otitis externa, unspecified     Created by Conversion     Larynx edema     Lung disease     Malignant neoplasm of vulva (H)     Created by Conversion [facility] Annotation: Apr 17 2007  8:24AM - Cammy Bui:  resection per Dr. Alfonso Mane 9/06;  Replacement Utility updated for latest IMO load    Medial epicondylitis     Onychomycosis     Osteoarthritis     Peptic ulcer     Polyneuropathy     Vulvar malignant neoplasm (H)     Past Surgical History:   Procedure Laterality Date    BIOPSY BREAST Right     BIOPSY BREAST Right 01/28/2015    BIOPSY BREAST Right 1/28/2015    Procedure: RIGHT BREAST BIOPSY AFTER WIRE LOCALIZATION AT 0940;  Surgeon: Renée Soriano MD;  Location: [facility];  Service:     BIOPSY OF BREAST, INCISIONAL      Description: Incisional Breast Biopsy;  Recorded: 11/13/2007;  Comments: benign    COLONOSCOPY N/A 6/14/2019    Procedure: COLONOSCOPY;  Surgeon: Eduardo Mora MD;  Location: [facility];  Service: Gastroenterology    ESOPHAGOSCOPY, GASTROSCOPY, DUODENOSCOPY (EGD), COMBINED N/A 11/6/2018    Procedure: ESOPHAGOGASTRODUODENOSCOPY;  Surgeon: Lit Fernando MD;  Location: [facility];  Service:     HYSTERECTOMY      JOINT REPLACEMENT Left     TKA    PICC TRIPLE LUMEN PLACEMENT  1/12/2023         [place] ABLATE HEART DYSRHYTHM FOCUS      Description: Catheter Ablation Atrial Fibrillation;  Recorded: 07/31/2012;  Comments: 7/24/12 PVI with Dr. Gardiner and nilay to all 5 pulm veins and CTI fl ablation line as well.    ZZC SUPRACERV ABD HYSTERECTOMY      Description: Supracervical Hysterectomy;  Proc Date: 01/01/1985;  Comments: some cervix left!; ovaries intact; done for bleeding    no family history of premature coronary artery disease Social History     Socioeconomic History    Marital status:      Spouse name: Not on file    Number of children: Not  on file    Years of education: Not on file    Highest education level: Not on file   Occupational History    Not on file   Tobacco Use    Smoking status: Some Days     Packs/day: .25     Types: Cigarettes     Passive exposure: Never    Smokeless tobacco: Never    Tobacco comments:     seen by TTS inpatient on 3/31/22   Vaping Use    Vaping Use: Never used   Substance and Sexual Activity    Alcohol use: Yes     Comment: Alcoholic Drinks/day: very little    Drug use: No    Sexual activity: Not on file   Other Topics Concern    Not on file   Social History Narrative    Not on file     Social Determinants of Health     Financial Resource Strain: Low Risk  (12/26/2023)    Financial Resource Strain     Within the past 12 months, have you or your family members you live with been unable to get utilities (heat, electricity) when it was really needed?: No   Food Insecurity: Low Risk  (12/26/2023)    Food Insecurity     Within the past 12 months, did you worry that your food would run out before you got money to buy more?: No     Within the past 12 months, did the food you bought just not last and you didn t have money to get more?: No   Transportation Needs: Low Risk  (12/26/2023)    Transportation Needs     Within the past 12 months, has lack of transportation kept you from medical appointments, getting your medicines, non-medical meetings or appointments, work, or from getting things that you need?: No   Physical Activity: Not on file   Stress: Not on file   Social Connections: Not on file   Interpersonal Safety: Low Risk  (9/20/2023)    Interpersonal Safety     Do you feel physically and emotionally safe where you currently live?: Yes     Within the past 12 months, have you been hit, slapped, kicked or otherwise physically hurt by someone?: No     Within the past 12 months, have you been humiliated or emotionally abused in other ways by your partner or ex-partner?: No   Housing Stability: Low Risk  (12/26/2023)    Housing  "Stability     Do you have housing? : Yes     Are you worried about losing your housing?: No           Lab Results    Chemistry/lipid CBC Cardiac Enzymes/BNP/TSH/INR   Lab Results   Component Value Date    CHOL 167 09/20/2023    HDL 75 09/20/2023    TRIG 83 09/20/2023    BUN 24.9 (H) 02/07/2024     02/07/2024    CO2 28 02/07/2024    Lab Results   Component Value Date    WBC 4.0 02/07/2024    HGB 13.9 02/07/2024    HCT 44.4 02/07/2024    MCV 96 02/07/2024     02/07/2024    Lab Results   Component Value Date    TROPONINI 0.07 05/24/2023     (H) 06/02/2023    TSH 1.17 05/24/2023    INR 1.34 (H) 02/08/2023     Lab Results   Component Value Date    TROPONINI 0.07 05/24/2023          Weight:    Wt Readings from Last 3 Encounters:   02/08/24 58.1 kg (128 lb)   01/29/24 54.9 kg (121 lb)   09/20/23 54.9 kg (121 lb)       Allergies  Allergies   Allergen Reactions    Celebrex [Celecoxib] Rash     patient had butterfly rash - \"lupus-like\"      Latex Rash         Surgical History  Past Surgical History:   Procedure Laterality Date    BIOPSY BREAST Right     BIOPSY BREAST Right 01/28/2015    BIOPSY BREAST Right 1/28/2015    Procedure: RIGHT BREAST BIOPSY AFTER WIRE LOCALIZATION AT 0940;  Surgeon: Renée Soriano MD;  Location: [facility];  Service:     BIOPSY OF BREAST, INCISIONAL      Description: Incisional Breast Biopsy;  Recorded: 11/13/2007;  Comments: benign    COLONOSCOPY N/A 6/14/2019    Procedure: COLONOSCOPY;  Surgeon: Eduardo Mora MD;  Location: [facility];  Service: Gastroenterology    ESOPHAGOSCOPY, GASTROSCOPY, DUODENOSCOPY (EGD), COMBINED N/A 11/6/2018    Procedure: ESOPHAGOGASTRODUODENOSCOPY;  Surgeon: Lit Fernando MD;  Location: [facility];  Service:     HYSTERECTOMY      JOINT REPLACEMENT Left     TKA    PICC TRIPLE LUMEN PLACEMENT  1/12/2023         CT ABLATE HEART DYSRHYTHM FOCUS      Description: Catheter Ablation Atrial Fibrillation;  Recorded: " 07/31/2012;  Comments: 7/24/12 PVI with Dr. Gardiner and nilay to all 5 pulm veins and CTI fl ablation line as well.    ZZC SUPRACERV ABD HYSTERECTOMY      Description: Supracervical Hysterectomy;  Proc Date: 01/01/1985;  Comments: some cervix left!; ovaries intact; done for bleeding       Social History  Tobacco:   History   Smoking Status    Some Days    Packs/day: 0.25    Types: Cigarettes   Smokeless Tobacco    Never    Alcohol:   Social History    Substance and Sexual Activity      Alcohol use: Yes        Comment: Alcoholic Drinks/day: very little   Illicit Drugs:   History   Drug Use No       Family History  Family History   Problem Relation Age of Onset    Heart Failure Mother     Cancer Other         paternal HX-laryngeal     Alcoholism Sister     No Known Problems Daughter     No Known Problems Maternal Grandmother     No Known Problems Maternal Grandfather     No Known Problems Paternal Grandmother     No Known Problems Paternal Grandfather     No Known Problems Maternal Aunt     No Known Problems Paternal Aunt     Alcoholism Sister     Alcoholism Brother     Alcoholism Father     Cancer Paternal Uncle         Gastric-Alcohol    Cancer Paternal Uncle         gastric-Alcohol    Hereditary Breast and Ovarian Cancer Syndrome No family hx of     Breast Cancer No family hx of     Colon Cancer No family hx of     Endometrial Cancer No family hx of     Ovarian Cancer No family hx of           Thom Robert MD on 2/8/2024      cc: Cammy Bui

## 2024-02-08 NOTE — PLAN OF CARE
- all AM medications were given prior to angiogram. Left the unit at 0730H. Came back from [facility] at 1045H. Right radial, right brachial and right groin were all accessed during angiogram. There was a skin tear noted at the left upper arm, normal saline and mepilex dressing were applied. There was a small bleeding and petechiae that was covered by tegaderm. Manual pressure applied and pressure dressing was applied. As per the [facility] staff, we should keep the dressing at the right upper brachial site per MD. Started removing the TR band at 1213H, but the right radial started bleeding. Instilled 2ml of air back to the TR band. Right TR band was removed at 1445H, no bleeding noted, but still the site has petechiae and ecchymosis but not edematous and the patient did not report any untoward symptoms at that site. The right brachial site has ecchymosis and petechiae. No hematoma noted at the right groin access site. At 1700H, complained of nausea, PRN Compazine was given.

## 2024-02-09 ENCOUNTER — TELEPHONE (OUTPATIENT)
Dept: CARDIOLOGY | Facility: CLINIC | Age: 74
End: 2024-02-09
Payer: COMMERCIAL

## 2024-02-09 ENCOUNTER — PATIENT OUTREACH (OUTPATIENT)
Dept: GERIATRIC MEDICINE | Facility: CLINIC | Age: 74
End: 2024-02-09
Payer: COMMERCIAL

## 2024-02-09 DIAGNOSIS — I50.9 ACUTE DECOMPENSATED HEART FAILURE (H): Primary | ICD-10-CM

## 2024-02-09 LAB
GLUCOSE BLDC GLUCOMTR-MCNC: 189 MG/DL (ref 70–99)
GLUCOSE BLDC GLUCOMTR-MCNC: 227 MG/DL (ref 70–99)
GLUCOSE BLDC GLUCOMTR-MCNC: 78 MG/DL (ref 70–99)
GLUCOSE BLDC GLUCOMTR-MCNC: 95 MG/DL (ref 70–99)
PATH REPORT.COMMENTS IMP SPEC: NORMAL
PATH REPORT.FINAL DX SPEC: NORMAL
PATH REPORT.GROSS SPEC: NORMAL
PATH REPORT.MICROSCOPIC SPEC OTHER STN: NORMAL
PATH REPORT.RELEVANT HX SPEC: NORMAL

## 2024-02-09 PROCEDURE — 99233 SBSQ HOSP IP/OBS HIGH 50: CPT | Performed by: INTERNAL MEDICINE

## 2024-02-09 PROCEDURE — 999N000157 HC STATISTIC RCP TIME EA 10 MIN

## 2024-02-09 PROCEDURE — 88305 TISSUE EXAM BY PATHOLOGIST: CPT | Mod: 26 | Performed by: PATHOLOGY

## 2024-02-09 PROCEDURE — 88112 CYTOPATH CELL ENHANCE TECH: CPT | Mod: 26 | Performed by: PATHOLOGY

## 2024-02-09 PROCEDURE — 250N000013 HC RX MED GY IP 250 OP 250 PS 637: Performed by: INTERNAL MEDICINE

## 2024-02-09 PROCEDURE — 210N000001 HC R&B IMCU HEART CARE

## 2024-02-09 PROCEDURE — 250N000012 HC RX MED GY IP 250 OP 636 PS 637: Performed by: INTERNAL MEDICINE

## 2024-02-09 PROCEDURE — 250N000011 HC RX IP 250 OP 636: Performed by: INTERNAL MEDICINE

## 2024-02-09 PROCEDURE — 94640 AIRWAY INHALATION TREATMENT: CPT | Mod: 76

## 2024-02-09 PROCEDURE — 94640 AIRWAY INHALATION TREATMENT: CPT

## 2024-02-09 PROCEDURE — 250N000009 HC RX 250: Performed by: HOSPITALIST

## 2024-02-09 PROCEDURE — 99233 SBSQ HOSP IP/OBS HIGH 50: CPT | Performed by: STUDENT IN AN ORGANIZED HEALTH CARE EDUCATION/TRAINING PROGRAM

## 2024-02-09 RX ORDER — NALOXONE HYDROCHLORIDE 0.4 MG/ML
0.2 INJECTION, SOLUTION INTRAMUSCULAR; INTRAVENOUS; SUBCUTANEOUS
Status: DISCONTINUED | OUTPATIENT
Start: 2024-02-09 | End: 2024-02-10 | Stop reason: HOSPADM

## 2024-02-09 RX ORDER — FUROSEMIDE 20 MG/1
10 TABLET ORAL DAILY
Status: DISCONTINUED | OUTPATIENT
Start: 2024-02-09 | End: 2024-02-10 | Stop reason: HOSPADM

## 2024-02-09 RX ORDER — NALOXONE HYDROCHLORIDE 0.4 MG/ML
0.4 INJECTION, SOLUTION INTRAMUSCULAR; INTRAVENOUS; SUBCUTANEOUS
Status: DISCONTINUED | OUTPATIENT
Start: 2024-02-09 | End: 2024-02-10 | Stop reason: HOSPADM

## 2024-02-09 RX ADMIN — GABAPENTIN 600 MG: 300 CAPSULE ORAL at 08:39

## 2024-02-09 RX ADMIN — ACETAMINOPHEN 650 MG: 325 TABLET ORAL at 17:11

## 2024-02-09 RX ADMIN — SUCRALFATE 1 G: 1 TABLET ORAL at 17:11

## 2024-02-09 RX ADMIN — SUCRALFATE 1 G: 1 TABLET ORAL at 21:03

## 2024-02-09 RX ADMIN — METOPROLOL TARTRATE 25 MG: 25 TABLET, FILM COATED ORAL at 08:39

## 2024-02-09 RX ADMIN — GABAPENTIN 600 MG: 300 CAPSULE ORAL at 13:55

## 2024-02-09 RX ADMIN — DIGOXIN 125 MCG: 125 TABLET ORAL at 08:45

## 2024-02-09 RX ADMIN — FLUTICASONE FUROATE AND VILANTEROL TRIFENATATE 1 PUFF: 200; 25 POWDER RESPIRATORY (INHALATION) at 08:40

## 2024-02-09 RX ADMIN — PANTOPRAZOLE SODIUM 40 MG: 40 TABLET, DELAYED RELEASE ORAL at 06:30

## 2024-02-09 RX ADMIN — CEFTRIAXONE SODIUM 1 G: 1 INJECTION, POWDER, FOR SOLUTION INTRAMUSCULAR; INTRAVENOUS at 17:15

## 2024-02-09 RX ADMIN — PANTOPRAZOLE SODIUM 40 MG: 40 TABLET, DELAYED RELEASE ORAL at 17:11

## 2024-02-09 RX ADMIN — ATORVASTATIN CALCIUM 40 MG: 40 TABLET, FILM COATED ORAL at 21:03

## 2024-02-09 RX ADMIN — ACETAMINOPHEN 650 MG: 325 TABLET ORAL at 21:09

## 2024-02-09 RX ADMIN — IPRATROPIUM BROMIDE AND ALBUTEROL SULFATE 3 ML: .5; 3 SOLUTION RESPIRATORY (INHALATION) at 15:48

## 2024-02-09 RX ADMIN — METOPROLOL TARTRATE 25 MG: 25 TABLET, FILM COATED ORAL at 21:03

## 2024-02-09 RX ADMIN — GABAPENTIN 600 MG: 300 CAPSULE ORAL at 21:02

## 2024-02-09 RX ADMIN — INSULIN ASPART 2 UNITS: 100 INJECTION, SOLUTION INTRAVENOUS; SUBCUTANEOUS at 08:40

## 2024-02-09 RX ADMIN — APIXABAN 5 MG: 5 TABLET, FILM COATED ORAL at 21:03

## 2024-02-09 RX ADMIN — APIXABAN 5 MG: 5 TABLET, FILM COATED ORAL at 08:38

## 2024-02-09 RX ADMIN — SENNOSIDES AND DOCUSATE SODIUM 1 TABLET: 8.6; 5 TABLET ORAL at 11:24

## 2024-02-09 RX ADMIN — SUCRALFATE 1 G: 1 TABLET ORAL at 11:24

## 2024-02-09 RX ADMIN — PREDNISONE 40 MG: 20 TABLET ORAL at 08:39

## 2024-02-09 RX ADMIN — SENNOSIDES AND DOCUSATE SODIUM 2 TABLET: 8.6; 5 TABLET ORAL at 21:03

## 2024-02-09 RX ADMIN — INSULIN ASPART 2 UNITS: 100 INJECTION, SOLUTION INTRAVENOUS; SUBCUTANEOUS at 12:37

## 2024-02-09 RX ADMIN — IPRATROPIUM BROMIDE AND ALBUTEROL SULFATE 3 ML: .5; 3 SOLUTION RESPIRATORY (INHALATION) at 20:16

## 2024-02-09 RX ADMIN — IPRATROPIUM BROMIDE AND ALBUTEROL SULFATE 3 ML: .5; 3 SOLUTION RESPIRATORY (INHALATION) at 08:09

## 2024-02-09 RX ADMIN — SUCRALFATE 1 G: 1 TABLET ORAL at 06:30

## 2024-02-09 ASSESSMENT — ACTIVITIES OF DAILY LIVING (ADL)
ADLS_ACUITY_SCORE: 33

## 2024-02-09 NOTE — PLAN OF CARE
"Goal Outcome Evaluation:               Patient is alert and oriented.  Denied pain.  Rested in bed over night.  Per patient request, patient wanted bed alarm off so she could sit up at side of bed when she wanted.  Band aid in place over right wrist and right brachial and right groin.  Per patient, swelling is much less at right brachial site that it had been previously, though site remains very bruised.  Staff encouraged patient to follow fluid restriction.  Will continue to monitor.    Dressings over skin tears on right forearm and left forearm cleansed and changed per patient request.  Sites are no longer bleeding.  Will continue to monitor.      Problem: Adult Inpatient Plan of Care  Goal: Plan of Care Review  Description: The Plan of Care Review/Shift note should be completed every shift.  The Outcome Evaluation is a brief statement about your assessment that the patient is improving, declining, or no change.  This information will be displayed automatically on your shift  note.  Outcome: Progressing  Goal: Patient-Specific Goal (Individualized)  Description: You can add care plan individualizations to a care plan. Examples of Individualization might be:  \"Parent requests to be called daily at 9am for status\", \"I have a hard time hearing out of my right ear\", or \"Do not touch me to wake me up as it startles  me\".  Outcome: Progressing  Goal: Absence of Hospital-Acquired Illness or Injury  Outcome: Progressing  Intervention: Identify and Manage Fall Risk  Recent Flowsheet Documentation  Taken 2/8/2024 2346 by Shalini Nolan, RN  Safety Promotion/Fall Prevention:   activity supervised   nonskid shoes/slippers when out of bed   clutter free environment maintained   safety round/check completed  Intervention: Prevent Skin Injury  Recent Flowsheet Documentation  Taken 2/8/2024 2346 by Shalini Nolan, RN  Body Position: position changed independently  Goal: Optimal Comfort and Wellbeing  Outcome: " Progressing  Goal: Readiness for Transition of Care  Outcome: Progressing

## 2024-02-09 NOTE — PROGRESS NOTES
TRANSITIONS OF CARE (LETICIA) LOG    LETICIA tasks should be completed by the CC within one (1) business day of notification of each transition. Follow up contact with member is required after return to their usual care setting.  Note:  If CC finds out about the transitions fifteen (15) days or more after the member has returned to their usual care setting, no LETICIA log is needed. However, the CC should check in with the member to discuss the transition process, any changes needed to the care plan and document it in a case note.     Member Name:  Mary Kay Tejada O Name:  [employer] Member ID#: [id number]   Product[place] [facility] Coordinator Contact:  Samantha Alexandra RN, PHN Agency/County/Care System: [facility]   Transition Communication Actions from Care Management Contact   Transition #1   Notification Date: 2/8/23 Transition Date:   2/7/24 Transition From: Home     Is this the member s usual care setting?               yes Transition To: [facility]   Transition Type:  Unplanned    Documentation from conversation with the member/responsible party, provider, discharging and receiving facility:   Date: 2/9/24: Received notification of admission to hospital with dx of Aortic Valve stenosis  CC contacted Hospital /discharge planner, Lillian Hudson via the [employer] Transitional Care Hand-In Process, with community care plan included.  CC reached out to member regarding transition and offered support as needed.  Reviewed and update care plan as needed.  Notified community service providers and placed services Hom[employer] PCA on hold as needed.  Transition log initiated.   PCP, Cammy Bui, notified of hospitalization via EMR.     Transition #2   Notification Date: 2/13/24 Transition Date:   2/10/24 Transition From: [facility]     Is this the member s usual care setting?               no Transition To:  Home   Transition Type:  Planned    See notes below.                                         *RETURN TO USUAL CARE SETTING: *Complete tasks below when the member is discharging TO their usual care setting within one (1) business day of notification..      For situations where the Care Coordinator is notified of the discharge prior to the date of discharge, the Care Coordinator must follow up with the member or designated representative to confirm that discharge actually occurred and discuss required LETICIA tasks as outlined in the LETICIA Instructions.  (This includes situations where it may be a  new  usual care setting for the member. (i.e., a community member who decides upon permanent nursing home placement following hospitalization and rehab).    Discuss with Member/Responsible Party:    Check  Yes  - if the member, family member and/or SNF/facility staff manages the following:    If  No  provide explanation in the comments section.          Date completed: 2/13/24 Communicated with member or their designated representative about the following:  care transition process; about changes to the member s health status; plan of care updates; education about transitions and how to prevent unplanned transitions/readmissions    Four Pillars for Optimal Transition:    Check  Yes  - if the member, family member and/or SNF/facility staff manages the following:    If  No  provide explanation in the comments section.          [x]  Yes     []  No Does the member have a follow-up appointment scheduled with primary care or specialist? (Mental health hospitalizations--the appt. should be w/in 7 days)              For mental health hospitalizations:  []  Yes     []  No     Does the member have a follow-up appointment scheduled with a mental health practitioner within 7 days of discharge?  [x]  Yes     []  No     Has a medication review been completed with member? If no, refer to PCP, home care nurse, MTM, pharmacist  [x]  Yes     []  No      Can the member manage their medications or is there a system in place to manage medications (e.g. home care set-up)?         [x]  Yes     []  No     Can the member verbalize warning signs and symptoms to watch for and how to respond?  [x]  Yes     []  No     Does the member have a copy of and understand their discharge instructions?  If no, assist to obtain copy of discharge instructions, review discharge instructions, and assist to contact PCP to discuss questions about their recent hospitalization.  [x]  Yes     []  No     Does the member have adequate food, housing and transportation?  If no, add goal and discuss additional supports available to the member                                                                                                                                                                                 [x]  Yes     []  No     Is the member safe in their home?  If no, document needs and support provided                                                                                                                                                                          []  Yes     [x]  No     Are there any concerns of vulnerability, abuse, or neglect?  If yes, document concerns and actions taken by Care Coordinator as a mandated                                                                                                                                                                              [x]  Yes     []  No     Does the member use a Personal Health Care Record?  Check  Yes  if visit summary, discharge summary, and/or healthcare summary are being used as a PHR.                                                                                                                                                                                  [x]  Yes     []  No     Have you reviewed the discharge summary with the member? If  No  provide explanation in comments.  [x]  Yes    "  []  No     Have you updated the member s care plan/support plan? Add new diagnosis, medications, treatments, goals & interventions, as applicable. If No, provide explanation in comments.    Comments:           Notes from conversation with the member/responsible party, provider, discharging and receiving facility (as applicable):     Member reports she is feeling \"better\". She is aware of the follow up appointments. She will call [employer] for ride or son will provide transportation.   She reports have not received pads and comfort bath wipes from [facility]. CC will follow up with [facility].           Samantha Alexandra RN, PHN   [facility]  [phone]    "

## 2024-02-09 NOTE — PLAN OF CARE
Problem: Adult Inpatient Plan of Care  Goal: Plan of Care Review  Description: The Plan of Care Review/Shift note should be completed every shift.  The Outcome Evaluation is a brief statement about your assessment that the patient is improving, declining, or no change.  This information will be displayed automatically on your shift  note.  Outcome: Progressing     Problem: Risk for Delirium  Goal: Optimal Coping  Outcome: Progressing     Problem: Gas Exchange Impaired  Goal: Optimal Gas Exchange  Outcome: Progressing     Problem: Comorbidity Management  Goal: Maintenance of COPD Symptom Control  Outcome: Progressing  Intervention: Maintain COPD Symptom Control  Recent Flowsheet Documentation  Taken 2/9/2024 1330 by Sherie Hood RN  Medication Review/Management: medications reviewed  Taken 2/9/2024 0840 by Sherie Hood RN  Medication Review/Management: medications reviewed     Problem: Adult Inpatient Plan of Care  Goal: Absence of Hospital-Acquired Illness or Injury  Intervention: Identify and Manage Fall Risk  Recent Flowsheet Documentation  Taken 2/9/2024 1330 by Sherie Hood RN  Safety Promotion/Fall Prevention: activity supervised  Taken 2/9/2024 0840 by Sherie Hood RN  Safety Promotion/Fall Prevention: activity supervised  Intervention: Prevent Infection  Recent Flowsheet Documentation  Taken 2/9/2024 1330 by Sherie Hood RN  Infection Prevention: rest/sleep promoted  Taken 2/9/2024 0840 by Sherie Hood RN  Infection Prevention: rest/sleep promoted  Goal: Optimal Comfort and Wellbeing  Intervention: Provide Person-Centered Care  Recent Flowsheet Documentation  Taken 2/9/2024 1330 by Sherie Hood RN  Trust Relationship/Rapport: care explained  Taken 2/9/2024 0840 by Sherie Hood RN  Trust Relationship/Rapport: care explained     Problem: Risk for Delirium  Goal: Improved Behavioral Control  Intervention: Minimize Safety Risk  Recent Flowsheet  Documentation  Taken 2/9/2024 1330 by Sherie Hood RN  Communication Enhancement Strategies: call light answered in person  Enhanced Safety Measures: other (see comments)  Trust Relationship/Rapport: care explained  Taken 2/9/2024 0840 by Sherie Hood RN  Communication Enhancement Strategies: call light answered in person  Enhanced Safety Measures: other (see comments)  Trust Relationship/Rapport: care explained  Goal: Improved Attention and Thought Clarity  Intervention: Maximize Cognitive Function  Recent Flowsheet Documentation  Taken 2/9/2024 1330 by Sherie Hood RN  Sensory Stimulation Regulation: care clustered  Reorientation Measures: clock in view  Taken 2/9/2024 0840 by Sherie Hood RN  Sensory Stimulation Regulation: care clustered  Reorientation Measures: clock in view     Problem: Comorbidity Management  Goal: Blood Pressure in Desired Range  Intervention: Maintain Blood Pressure Management  Recent Flowsheet Documentation  Taken 2/9/2024 1330 by Sherie Hood RN  Medication Review/Management: medications reviewed  Taken 2/9/2024 0840 by Sherie Hood RN  Medication Review/Management: medications reviewed   Goal Outcome Evaluation:       Patient is alert and able to let her needs be known. Denies pain when asked. BP running soft. SB/SR on the monitor. Denies dizziness. Angio sites WNL with exception of bruising to right arm. Continue plan of care.

## 2024-02-09 NOTE — CONSULTS
SPIRITUAL HEALTH SERVICES Progress Note  [facility], P3    Saw pt Mary Kay Tejada per consult order/length of stay assessment.    Patient/Family Understanding of Illness and Goals of Care - Mary Kay shared about her hospital course and cardiac issues. She reports that she will eventually need a heart valve replacement in near future.     Distress and Loss - Ongoing health issues, no other distress noted at this time.     Strengths, Coping, and Resources - Patient shared about family/life history. She has 3 grandchildren that are strong source of karen and motivation for her.     Meaning, Beliefs, and Spirituality - Patient comes from Hinduism jaswinder background and derives meaning, purpose, and comfort from jaswinder. She shared aspects of her jaswinder experience from childhood and the role that has for her as an adult. She does not have current connection to a jaswinder community. Patient welcomed prayer and expressed appreciation for the visit.      Plan of Care - No further plans to visit at this time, but  staff available if further needs arise.     Gilbert Albright MDiv, [facility]  /Manager Spiritual Health Services  [phone]       Spiritual Health Services is available 24/7 for emergent requests and consults, either by paging the on-call  or by entering an ASAP/STAT consult in [employer], which will also page the on-call .

## 2024-02-09 NOTE — PROGRESS NOTES
[facility] Care Coordination Contact    [facility]  Ambulatory Care Coordination to Inpatient Care Management   Hand-In Communication    Date:  February 9, 2024  Name: Mary Kay Tejada is enrolled in [facility] Care Coordination program and I am the Lead Care Coordinator.  CC Contact Information:.   Payor Source: Payor: MEDICA / Plan: MEDICA D[employer] SOL[employer]IONS [employer]/ [employer]NERS / Product Type: HMO /   Current services in place:     Please see the CC Snaphot and Care Management Flowsheets for specific  details of this Mary Kay Tejada care plan.   Additional details/specific concerns r/t this admission:    No additional concerns at this time Mary Kay has PCA and homemaking services through [facility]. PCA 2.25 hrs daily snd homemaking 3 hrs weekly.      I will follow this admission in Epic. Please feel free to contact me with questions or for further collaboration in discharge planning.    Samantha Alexandra RN, PHN   [facility]  [phone]

## 2024-02-09 NOTE — PROGRESS NOTES
Care Management Follow Up    Length of Stay (days): 3    Expected Discharge Date: 02/10/2024     Concerns to be Addressed: discharge planning     Patient plan of care discussed at interdisciplinary rounds: Yes    Anticipated Discharge Disposition:     Home with family  Anticipated Discharge Services:  NA  Anticipated Discharge DME:  [facility] for home oxygen    Patient/family educated on Medicare website which has current facility and service quality ratings:    Education Provided on the Discharge Plan:  yes  Patient/Family in Agreement with the Plan:  yes    Referrals Placed by CM/SW:    Private pay costs discussed: Not applicable    Additional Information:  Per physician, pt may discharge to home tomorrow, cardiology has signed off.  Pt lives with family and has PCA,  and family support. Pt denies needs. Anticipate pt to discharge on 2/10. SW to follow for discharge needs.     TUNDE Carreno

## 2024-02-09 NOTE — PLAN OF CARE
Problem: Adult Inpatient Plan of Care  Goal: Optimal Comfort and Wellbeing  Outcome: Progressing  Intervention: Provide Person-Centered Care  Recent Flowsheet Documentation  Taken 2/8/2024 2000 by Candi Nash RN  Trust Relationship/Rapport:   care explained   choices provided   empathic listening provided   reassurance provided   thoughts/feelings acknowledged   Goal Outcome Evaluation:      Plan of Care Reviewed With: patient    Overall Patient Progress: improvingOverall Patient Progress: improving       A & O. Denies pain. VSS on 3L of O2. Sinus rhythm. Angio sites remain unchanged- groin and radial site WDL, brachial site ecchymosis. Bed alarm on for safety.

## 2024-02-09 NOTE — PROGRESS NOTES
HEART CARE NOTE          Assessment/Recommendations   1.  Severe aortic stenosis  Assessment / Plan  Significant improvement in respiratory status s/p thoracentesis; LVEDP and PAD elevated on angio - will start low dose oral furosemide 10 mg for ongoing gentle diuresis given that patient is preload dependent   Patient is high risk of adverse cardiac events 2/2 advanced age, severe valvular disease, CAD, DM2     2. Afib/flutter  Assessment / Plan  Continue digoxin, metoprolol, apixaban     3. CAD  Assessment / Plan  Denies chest pain or anginal equivalents   S/p coronary angiogram significant for mild non-obstructive disease  Continue atorvastatin and metoprolol     4. HLP  Assessment / Plan  Resume atorvastatin     5. COPD  Assessment / Plan  Management and supportive care per primary team     6. DM2  Assessment / Plan  Management per primary team  Currently on ISS    Plan of care discussed on February 9, 2024 with patient at bedside, and primary team overseeing patient's care    50 minutes spent reviewing prior records (including documentation, laboratory studies, cardiac testing/imaging), history and physical exam, planning, and subsequent documentation.    Cardiology team will sign-off for now. Please do not hesitate to consult us again if new questions or concerns arise. Follow-up appointment will be arranged by CORE/HF clinic.         History of Present Illness/Subjective    Ms. Mary Kay Tejada is a 74 year old female with a PMHx significant for (per Epic notation) COPD on O2 at home(2.5 liters),active smoker, hx of severe aortic stenosis, hx of CAD, Afib/Flutter, who comes in with dizziness, sob, and lower abd pain concerning for cystitis      Today, Mrs. Tejada denies acute cardiac events or complaints; Management plan as detailed above     ECG: Personally reviewed. nonspecific ST and T waves changes, sinus bradycardia, left axis deviation.     ECHO (personnaly Reviewed on 2/7/24):   Compared to the prior  "study dated 1/11/23, there are changes as noted. Aortic  stenosis is now severe.  The left ventricle is normal in size. Left ventricular function is normal.The  ejection fraction is 60-65%.  The right ventricle is mildly dilated.  The left atrium is mildly dilated.  Severe valvular aortic stenosis. There is mild to moderate (1-2+) aortic  regurgitation.  Small pericardial effusion    Medical history and pertinent documents reviewed in Care Everywhere please where applicable see details above        Physical Examination Review of Systems   /50 (BP Location: Right leg)   Pulse 52   Temp 97.8  F (36.6  C) (Oral)   Resp 18   Ht 1.651 m (5' 5\")   Wt 56.8 kg (125 lb 3.5 oz)   LMP  (LMP Unknown)   SpO2 95%   BMI 20.84 kg/m    Body mass index is 20.84 kg/m .  Wt Readings from Last 3 Encounters:   02/09/24 56.8 kg (125 lb 3.5 oz)   01/29/24 54.9 kg (121 lb)   09/20/23 54.9 kg (121 lb)     General Appearance:   no distress, normal body habitus   ENT/Mouth: membranes moist, no oral lesions or bleeding gums.      EYES:  no scleral icterus, normal conjunctivae   Neck: no carotid bruits or thyromegaly   Chest/Lungs:   lungs are clear to auscultation, no rales or wheezing, equal chest wall expansion    Cardiovascular:   Regular. Normal first and second heart sounds with +FITO; no rubs, or gallops; the carotid, radial and posterior tibial pulses are intact, no JVD or LE edema bilaterally    Abdomen:  no organomegaly, masses, bruits, or tenderness; bowel sounds are present   Extremities: no cyanosis or clubbing   Skin: no xanthelasma, warm.    Neurologic: NAD     Psychiatric: alert and oriented x3, calm     A complete 10 systems ROS was reviewed  And is negative except what is listed in the HPI.          Medical History  Surgical History Family History Social History   Past Medical History:   Diagnosis Date    Anemia     Aortic stenosis     Atrial fibrillation (H)     Atrial flutter (H)     Benign neoplasm of " adenomatous polyp     large intestine     Chronic constipation     Chronic pain syndrome     COPD (chronic obstructive pulmonary disease) (H)     Oxygen at night     Dependence on supplemental oxygen     Oxygen at noc, during the day as needed    Depression     Diabetes mellitus (H)     Dry eye syndrome     Fibromyalgia     Ganglion     left wrist    GERD (gastroesophageal reflux disease)     Hyperlipidemia     Hypertension     Hypokalemia     Infective otitis externa, unspecified     Created by Conversion     Larynx edema     Lung disease     Malignant neoplasm of vulva (H)     Created by Conversion [facility] Annotation: Apr 17 2007  8:24AM - Cammy Bui:  resection per Dr. Alfonso Mane 9/06;  Replacement Utility updated for latest IMO load    Medial epicondylitis     Onychomycosis     Osteoarthritis     Peptic ulcer     Polyneuropathy     Vulvar malignant neoplasm (H)     Past Surgical History:   Procedure Laterality Date    BIOPSY BREAST Right     BIOPSY BREAST Right 01/28/2015    BIOPSY BREAST Right 1/28/2015    Procedure: RIGHT BREAST BIOPSY AFTER WIRE LOCALIZATION AT 0940;  Surgeon: Renée Soriano MD;  Location: [facility];  Service:     BIOPSY OF BREAST, INCISIONAL      Description: Incisional Breast Biopsy;  Recorded: 11/13/2007;  Comments: benign    COLONOSCOPY N/A 6/14/2019    Procedure: COLONOSCOPY;  Surgeon: Eduardo Mora MD;  Location: [facility];  Service: Gastroenterology    ESOPHAGOSCOPY, GASTROSCOPY, DUODENOSCOPY (EGD), COMBINED N/A 11/6/2018    Procedure: ESOPHAGOGASTRODUODENOSCOPY;  Surgeon: Lit Fernando MD;  Location: [facility];  Service:     HYSTERECTOMY      JOINT REPLACEMENT Left     TKA    PICC TRIPLE LUMEN PLACEMENT  1/12/2023         IL ABLATE HEART DYSRHYTHM FOCUS      Description: Catheter Ablation Atrial Fibrillation;  Recorded: 07/31/2012;  Comments: 7/24/12 PVI with Dr. Gardiner and nilay to all 5 pulm veins and CTI fl ablation line as  well.    Z SUPRACERV ABD HYSTERECTOMY      Description: Supracervical Hysterectomy;  Proc Date: 01/01/1985;  Comments: some cervix left!; ovaries intact; done for bleeding    no family history of premature coronary artery disease Social History     Socioeconomic History    Marital status:      Spouse name: Not on file    Number of children: Not on file    Years of education: Not on file    Highest education level: Not on file   Occupational History    Not on file   Tobacco Use    Smoking status: Some Days     Packs/day: .25     Types: Cigarettes     Passive exposure: Never    Smokeless tobacco: Never    Tobacco comments:     seen by TTS inpatient on 3/31/22   Vaping Use    Vaping Use: Never used   Substance and Sexual Activity    Alcohol use: Yes     Comment: Alcoholic Drinks/day: very little    Drug use: No    Sexual activity: Not on file   Other Topics Concern    Not on file   Social History Narrative    Not on file     Social Determinants of Health     Financial Resource Strain: Low Risk  (12/26/2023)    Financial Resource Strain     Within the past 12 months, have you or your family members you live with been unable to get utilities (heat, electricity) when it was really needed?: No   Food Insecurity: Low Risk  (12/26/2023)    Food Insecurity     Within the past 12 months, did you worry that your food would run out before you got money to buy more?: No     Within the past 12 months, did the food you bought just not last and you didn t have money to get more?: No   Transportation Needs: Low Risk  (12/26/2023)    Transportation Needs     Within the past 12 months, has lack of transportation kept you from medical appointments, getting your medicines, non-medical meetings or appointments, work, or from getting things that you need?: No   Physical Activity: Not on file   Stress: Not on file   Social Connections: Not on file   Interpersonal Safety: Low Risk  (9/20/2023)    Interpersonal Safety     Do you  "feel physically and emotionally safe where you currently live?: Yes     Within the past 12 months, have you been hit, slapped, kicked or otherwise physically hurt by someone?: No     Within the past 12 months, have you been humiliated or emotionally abused in other ways by your partner or ex-partner?: No   Housing Stability: Low Risk  (12/26/2023)    Housing Stability     Do you have housing? : Yes     Are you worried about losing your housing?: No           Lab Results    Chemistry/lipid CBC Cardiac Enzymes/BNP/TSH/INR   Lab Results   Component Value Date    CHOL 167 09/20/2023    HDL 75 09/20/2023    TRIG 83 09/20/2023    BUN 32.4 (H) 02/08/2024     02/08/2024    CO2 34 (H) 02/08/2024    Lab Results   Component Value Date    WBC 4.0 02/07/2024    HGB 13.9 02/07/2024    HCT 44.4 02/07/2024    MCV 96 02/07/2024     02/07/2024    Lab Results   Component Value Date    TROPONINI 0.07 05/24/2023     (H) 06/02/2023    TSH 1.17 05/24/2023    INR 1.34 (H) 02/08/2023     Lab Results   Component Value Date    TROPONINI 0.07 05/24/2023          Weight:    Wt Readings from Last 3 Encounters:   02/09/24 56.8 kg (125 lb 3.5 oz)   01/29/24 54.9 kg (121 lb)   09/20/23 54.9 kg (121 lb)       Allergies  Allergies   Allergen Reactions    Celebrex [Celecoxib] Rash     patient had butterfly rash - \"lupus-like\"      Latex Rash         Surgical History  Past Surgical History:   Procedure Laterality Date    BIOPSY BREAST Right     BIOPSY BREAST Right 01/28/2015    BIOPSY BREAST Right 1/28/2015    Procedure: RIGHT BREAST BIOPSY AFTER WIRE LOCALIZATION AT 0940;  Surgeon: Renée Soriano MD;  Location: [facility];  Service:     BIOPSY OF BREAST, INCISIONAL      Description: Incisional Breast Biopsy;  Recorded: 11/13/2007;  Comments: benign    COLONOSCOPY N/A 6/14/2019    Procedure: COLONOSCOPY;  Surgeon: Eduardo Mora MD;  Location: [facility];  Service: Gastroenterology    ESOPHAGOSCOPY, GASTROSCOPY, " DUODENOSCOPY (EGD), COMBINED N/A 11/6/2018    Procedure: ESOPHAGOGASTRODUODENOSCOPY;  Surgeon: Lit Fernando MD;  Location: [facility];  Service:     HYSTERECTOMY      JOINT REPLACEMENT Left     TKA    PICC TRIPLE LUMEN PLACEMENT  1/12/2023         NM ABLATE HEART DYSRHYTHM FOCUS      Description: Catheter Ablation Atrial Fibrillation;  Recorded: 07/31/2012;  Comments: 7/24/12 PVI with Dr. Gardiner and nilay to all 5 pulm veins and CTI fl ablation line as well.    ZZC SUPRACERV ABD HYSTERECTOMY      Description: Supracervical Hysterectomy;  Proc Date: 01/01/1985;  Comments: some cervix left!; ovaries intact; done for bleeding       Social History  Tobacco:   History   Smoking Status    Some Days    Packs/day: 0.25    Types: Cigarettes   Smokeless Tobacco    Never    Alcohol:   Social History    Substance and Sexual Activity      Alcohol use: Yes        Comment: Alcoholic Drinks/day: very little   Illicit Drugs:   History   Drug Use No       Family History  Family History   Problem Relation Age of Onset    Heart Failure Mother     Cancer Other         paternal HX-laryngeal     Alcoholism Sister     No Known Problems Daughter     No Known Problems Maternal Grandmother     No Known Problems Maternal Grandfather     No Known Problems Paternal Grandmother     No Known Problems Paternal Grandfather     No Known Problems Maternal Aunt     No Known Problems Paternal Aunt     Alcoholism Sister     Alcoholism Brother     Alcoholism Father     Cancer Paternal Uncle         Gastric-Alcohol    Cancer Paternal Uncle         gastric-Alcohol    Hereditary Breast and Ovarian Cancer Syndrome No family hx of     Breast Cancer No family hx of     Colon Cancer No family hx of     Endometrial Cancer No family hx of     Ovarian Cancer No family hx of           Thom Robert MD on 2/9/2024      cc: Cammy Bui

## 2024-02-09 NOTE — TREATMENT PLAN
[facility]AT Treatment Plan    Patient Score: 8  Patient Acuity: 4    Clinical Indication for Therapy: history of bronchospasm    Therapy Ordered: Duo Q4    Assessment Summary: Patient has a history of COPD, current smoker, O2 dependent on 2.5L. Currently on 3L NC, sating  95%, RR 16, BD diminished. Patient states breathing is better yesterday and today and would not like to be disturbed while sleeping.     Plan: Duoneb QID and Albuterol Q4 PRN.  RT will follow.      Nikki Alexandra, RT  2/8/2024

## 2024-02-10 VITALS
RESPIRATION RATE: 18 BRPM | HEART RATE: 66 BPM | HEIGHT: 65 IN | SYSTOLIC BLOOD PRESSURE: 101 MMHG | OXYGEN SATURATION: 91 % | DIASTOLIC BLOOD PRESSURE: 54 MMHG | BODY MASS INDEX: 21.91 KG/M2 | TEMPERATURE: 98 F | WEIGHT: 131.5 LBS

## 2024-02-10 LAB
ANION GAP SERPL CALCULATED.3IONS-SCNC: 7 MMOL/L (ref 7–15)
BUN SERPL-MCNC: 24.2 MG/DL (ref 8–23)
CALCIUM SERPL-MCNC: 8.9 MG/DL (ref 8.8–10.2)
CHLORIDE SERPL-SCNC: 104 MMOL/L (ref 98–107)
CREAT SERPL-MCNC: 0.74 MG/DL (ref 0.51–0.95)
DEPRECATED HCO3 PLAS-SCNC: 31 MMOL/L (ref 22–29)
EGFRCR SERPLBLD CKD-EPI 2021: 84 ML/MIN/1.73M2
ERYTHROCYTE [DISTWIDTH] IN BLOOD BY AUTOMATED COUNT: 13.4 % (ref 10–15)
GLUCOSE BLDC GLUCOMTR-MCNC: 139 MG/DL (ref 70–99)
GLUCOSE SERPL-MCNC: 106 MG/DL (ref 70–99)
HCT VFR BLD AUTO: 39.7 % (ref 35–47)
HGB BLD-MCNC: 12.5 G/DL (ref 11.7–15.7)
MCH RBC QN AUTO: 29.9 PG (ref 26.5–33)
MCHC RBC AUTO-ENTMCNC: 31.5 G/DL (ref 31.5–36.5)
MCV RBC AUTO: 95 FL (ref 78–100)
PLATELET # BLD AUTO: 150 10E3/UL (ref 150–450)
POTASSIUM SERPL-SCNC: 4 MMOL/L (ref 3.4–5.3)
RBC # BLD AUTO: 4.18 10E6/UL (ref 3.8–5.2)
SODIUM SERPL-SCNC: 142 MMOL/L (ref 135–145)
WBC # BLD AUTO: 6.3 10E3/UL (ref 4–11)

## 2024-02-10 PROCEDURE — 36415 COLL VENOUS BLD VENIPUNCTURE: CPT | Performed by: STUDENT IN AN ORGANIZED HEALTH CARE EDUCATION/TRAINING PROGRAM

## 2024-02-10 PROCEDURE — 250N000012 HC RX MED GY IP 250 OP 636 PS 637: Performed by: INTERNAL MEDICINE

## 2024-02-10 PROCEDURE — 80048 BASIC METABOLIC PNL TOTAL CA: CPT | Performed by: STUDENT IN AN ORGANIZED HEALTH CARE EDUCATION/TRAINING PROGRAM

## 2024-02-10 PROCEDURE — 250N000013 HC RX MED GY IP 250 OP 250 PS 637: Performed by: INTERNAL MEDICINE

## 2024-02-10 PROCEDURE — 99239 HOSP IP/OBS DSCHRG MGMT >30: CPT | Performed by: STUDENT IN AN ORGANIZED HEALTH CARE EDUCATION/TRAINING PROGRAM

## 2024-02-10 PROCEDURE — 250N000013 HC RX MED GY IP 250 OP 250 PS 637: Performed by: STUDENT IN AN ORGANIZED HEALTH CARE EDUCATION/TRAINING PROGRAM

## 2024-02-10 PROCEDURE — 250N000009 HC RX 250: Performed by: HOSPITALIST

## 2024-02-10 PROCEDURE — 85027 COMPLETE CBC AUTOMATED: CPT | Performed by: STUDENT IN AN ORGANIZED HEALTH CARE EDUCATION/TRAINING PROGRAM

## 2024-02-10 PROCEDURE — 94640 AIRWAY INHALATION TREATMENT: CPT

## 2024-02-10 RX ORDER — PREDNISONE 20 MG/1
40 TABLET ORAL DAILY
Qty: 2 TABLET | Refills: 0 | Status: CANCELLED | OUTPATIENT
Start: 2024-02-10

## 2024-02-10 RX ORDER — FUROSEMIDE 20 MG
10 TABLET ORAL DAILY
Qty: 30 TABLET | Refills: 1 | Status: ON HOLD | OUTPATIENT
Start: 2024-02-10 | End: 2024-02-23

## 2024-02-10 RX ADMIN — SUCRALFATE 1 G: 1 TABLET ORAL at 06:46

## 2024-02-10 RX ADMIN — DIGOXIN 125 MCG: 125 TABLET ORAL at 09:11

## 2024-02-10 RX ADMIN — GABAPENTIN 600 MG: 300 CAPSULE ORAL at 09:08

## 2024-02-10 RX ADMIN — INSULIN ASPART 2 UNITS: 100 INJECTION, SOLUTION INTRAVENOUS; SUBCUTANEOUS at 09:14

## 2024-02-10 RX ADMIN — FUROSEMIDE 10 MG: 20 TABLET ORAL at 09:11

## 2024-02-10 RX ADMIN — METOPROLOL TARTRATE 25 MG: 25 TABLET, FILM COATED ORAL at 09:09

## 2024-02-10 RX ADMIN — AMOXICILLIN AND CLAVULANATE POTASSIUM 1 TABLET: 875; 125 TABLET, FILM COATED ORAL at 09:09

## 2024-02-10 RX ADMIN — FLUTICASONE FUROATE AND VILANTEROL TRIFENATATE 1 PUFF: 200; 25 POWDER RESPIRATORY (INHALATION) at 09:11

## 2024-02-10 RX ADMIN — PREDNISONE 40 MG: 20 TABLET ORAL at 09:09

## 2024-02-10 RX ADMIN — IPRATROPIUM BROMIDE AND ALBUTEROL SULFATE 3 ML: .5; 3 SOLUTION RESPIRATORY (INHALATION) at 08:25

## 2024-02-10 RX ADMIN — APIXABAN 5 MG: 5 TABLET, FILM COATED ORAL at 09:09

## 2024-02-10 RX ADMIN — DILTIAZEM HYDROCHLORIDE 120 MG: 120 CAPSULE, COATED, EXTENDED RELEASE ORAL at 09:09

## 2024-02-10 RX ADMIN — PANTOPRAZOLE SODIUM 40 MG: 40 TABLET, DELAYED RELEASE ORAL at 06:46

## 2024-02-10 ASSESSMENT — ACTIVITIES OF DAILY LIVING (ADL)
ADLS_ACUITY_SCORE: 33

## 2024-02-10 NOTE — PLAN OF CARE
"Goal Outcome Evaluation:             Patient denied pain.  Band aids intact over right brachial site, right wrist site, and right groin site.  Patient has extensive bruising on arms, appears unchanged from previous shift.  O2 lowered to 2.5 L/min nasal cannula.  Patient occasionally up to restroom with stand by assist to help with tubing.  Will continue to monitor.      Problem: Adult Inpatient Plan of Care  Goal: Plan of Care Review  Description: The Plan of Care Review/Shift note should be completed every shift.  The Outcome Evaluation is a brief statement about your assessment that the patient is improving, declining, or no change.  This information will be displayed automatically on your shift  note.  Outcome: Progressing  Goal: Patient-Specific Goal (Individualized)  Description: You can add care plan individualizations to a care plan. Examples of Individualization might be:  \"Parent requests to be called daily at 9am for status\", \"I have a hard time hearing out of my right ear\", or \"Do not touch me to wake me up as it startles  me\".  Outcome: Progressing  Goal: Absence of Hospital-Acquired Illness or Injury  Outcome: Progressing  Intervention: Identify and Manage Fall Risk  Recent Flowsheet Documentation  Taken 2/10/2024 0000 by Shalini Nolan, RN  Safety Promotion/Fall Prevention: activity supervised  Intervention: Prevent Skin Injury  Recent Flowsheet Documentation  Taken 2/10/2024 0000 by Shalini Nolan, RN  Body Position: position changed independently  Goal: Optimal Comfort and Wellbeing  Outcome: Progressing  Goal: Readiness for Transition of Care  Outcome: Progressing              "

## 2024-02-10 NOTE — DISCHARGE SUMMARY
[facility]  Hospitalist Discharge Summary      Date of Admission:  2/6/2024  Date of Discharge:  2/10/2024 10:35 AM  Discharging Provider: Liliana Francois MD  Discharge Service: Hospitalist Service    Discharge Diagnoses   COPD exacerbation with chronic hypoxic respiratory failure  Recurrent right pleural effusion  Severe aortic stenosis  Pulmonary hypertension  Tobacco abuse  History of A-fib/flutter  Deconditioning  Diabetes mellitus  Chronic pain, fibromyalgia  History of mood disorder    Clinically Significant Risk Factors          Follow-ups Needed After Discharge   Follow-up Appointments     Follow-up and recommended labs and tests       Follow up with primary care provider, SAVANA SILVER,   within 7 days for hospital follow- up.  The following labs/tests are   recommended: BMP in a week.            Unresulted Labs Ordered in the Past 30 Days of this Admission       Date and Time Order Name Status Description    2/7/2024  9:57 AM Anaerobic bacterial culture Preliminary     2/7/2024  9:57 AM Pleural fluid Aerobic Bacterial Culture Routine With Gram Stain Preliminary     2/7/2024 12:19 AM Blood Culture Hand, Left Preliminary     2/7/2024 12:19 AM Blood Culture Hand, Right Preliminary         These results will be followed up by PCP    Discharge Disposition   Discharged to home  Condition at discharge: Good    Hospital Course   74F with hx of COPD on O2 at home(2.5 liters),active smoker, hx of severe aortic stenosis (in process of TAVR work-up but delayed due to anxiety),  hx of CAD, Afib/Flutter, who comes in with dizziness, sob, and lower abd pain in the setting of some dietary indiscretion.   Underwent R&LHC in work-up for TAVR with normal filling pressures and non-obstructive CAD   COPD exacerbation with chronic hypoxic respiratory failure   - was Ceftriaxone and pred 40mg x 5 days.  Discharged with 3 more days of Augmentin  -Bronchodilators   Recurrent R pleural  effusion   -underwent thora 02/07 for 1L.  -fluid is exudative by Lights again.  Gram stain negative and cultures NGTD.  Cytology pending.   Severe aortic stenosis   - follow-up with valve clinic for TAVR  -Low-dose furosemide initiated today on 2/9  Pulm HTN due to pulmonary disease   Tobacco abuse -1 ppd. declined patch, counseled about the need to quit smoking.  She is contemplative.  Advised to discuss about Chantix with her primary care provider  hx of Afib/flutter  - Toprol, dig, dilt, eliquis  -Diltiazem and Toprol today were held due to low blood pressure   Deconditioning  -having difficult time getting around due to CONTE, suspect copd advance, and valve issues  -high risk falls--had fall at home recently  -fall precautions   DM  - insulin carb count and scale  -Accu-Cheks  -Hypoglycemia protocol   Chronic pain, fibromyalgia  -on oxy   Hx of mood d/o     Patient is  clinically stable enough to be discharged home.  Cleared by cardiology for discharge.  Diltiazem is discontinued due to low blood pressure.  Patient will follow with her PCP and cardiology as an outpatient.    Consultations This Hospital Stay   CARDIOLOGY IP CONSULT  PHARMACY IP CONSULT  CARE MANAGEMENT / SOCIAL WORK IP CONSULT  PHARMACY IP CONSULT  PHARMACY IP CONSULT  SPIRITUAL HEALTH SERVICES IP CONSULT  SMOKING CESSATION PROGRAM IP CONSULT    Code Status   Full Code    Time Spent on this Encounter   I, Liliana Francois MD, personally saw the patient today and spent greater than 30 minutes discharging this patient.       Liliana Francois MD  [facility]  [address] [place] [id number]  Phone: [phone]  Fax: [phone]  ______________________________________________________________________    Physical Exam   Vital Signs: Temp: 98  F (36.7  C) Temp src: Oral BP: 101/54 Pulse: 66   Resp: 16 SpO2: 92 % O2 Device: Nasal cannula Oxygen Delivery: 2.5 LPM  Weight: 131 lbs 8 oz    General  Appearance:  No distress noted, chronically sick looking  Respiratory: Diminished air entry basally bilaterally  Cardiovascular: S1 and S2 heard   GI: soft abdomen, no tenderness, normoactive bowel  Skin: Intact and warm         Primary Care Physician   SAVANA SILVER    Discharge Orders      Reason for your hospital stay    Dizziness     Follow-up and recommended labs and tests     Follow up with primary care provider, SAVANA SILVER, within 7 days for hospital follow- up.  The following labs/tests are recommended: BMP in a week.     Activity    Your activity upon discharge: activity as tolerated     Oxygen Adult/Peds    Oxygen Documentation  I certify that this patient, Mary Kay Tejada has been under my care (or a nurse practitioner or physican's assistant working with me). This is the face-to-face encounter for oxygen medical necessity.      At the time of this encounter, I have reviewed the qualifying testing and have determined that supplemental oxygen is reasonable and necessary and is expected to improve the patient's condition in a home setting.       Patient has continued oxygen desaturation due to COPD J44.9.    If portability is ordered, is the patient mobile within the home? yes     Diet    Follow this diet upon discharge: Orders Placed This Encounter      Fluid restriction 1800 ML FLUID      Moderate Consistent Carb (60 g CHO per Meal) Diet       Significant Results and Procedures       Discharge Medications   Current Discharge Medication List        START taking these medications    Details   amoxicillin-clavulanate (AUGMENTIN) 875-125 MG tablet Take 1 tablet by mouth every 12 hours  Qty: 6 tablet, Refills: 0    Associated Diagnoses: COPD exacerbation (H)           CONTINUE these medications which have CHANGED    Details   furosemide (LASIX) 20 MG tablet Take 0.5 tablets (10 mg) by mouth daily  Qty: 30 tablet, Refills: 1    Associated Diagnoses: Acute on chronic diastolic  heart failure (H)           CONTINUE these medications which have NOT CHANGED    Details   albuterol (PROAIR HFA/PROVENTIL HFA/VENTOLIN HFA) 108 (90 Base) MCG/ACT inhaler INHALE 2 PUFFS INTO THE LUNGS EVERY 4 HOURS AS NEEDED FOR WHEEZING  Qty: 13.4 g, Refills: 3    Comments: Pharmacy may dispense brand covered by insurance (Proair, or proventil or ventolin or generic albuterol inhaler)  Associated Diagnoses: Chronic obstructive pulmonary disease with acute lower respiratory infection (H)      apixaban ANTICOAGULANT (ELIQUIS) 5 MG tablet Take 1 tablet (5 mg) by mouth 2 times daily  Qty: 180 tablet, Refills: 2    Associated Diagnoses: Atrial fibrillation, unspecified type (H)      atorvastatin (LIPITOR) 20 MG tablet TAKE 1 TABLET(20 MG) BY MOUTH AT BEDTIME  Qty: 90 tablet, Refills: 3    Associated Diagnoses: Mixed hyperlipidemia      digoxin (LANOXIN) 125 MCG tablet TAKE 1 TABLET(125 MCG) BY MOUTH DAILY  Qty: 90 tablet, Refills: 2    Associated Diagnoses: Acute on chronic diastolic heart failure (H)      gabapentin (NEURONTIN) 600 MG tablet TAKE 1 TABLET(600 MG) BY MOUTH THREE TIMES DAILY  Qty: 270 tablet, Refills: 2    Associated Diagnoses: Type 2 diabetes mellitus with hypoglycemia without coma, with long-term current use of insulin (H)      ipratropium - albuterol 0.5 mg/2.5 mg/3 mL (DUONEB) 0.5-2.5 (3) MG/3ML neb solution Take 1 vial (3 mLs) by nebulization every 6 hours as needed for shortness of breath or wheezing  Qty: 90 mL, Refills: 0      JARDIANCE 10 MG TABS tablet TAKE 1 TABLET(10 MG) BY MOUTH DAILY  Qty: 90 tablet, Refills: 2    Associated Diagnoses: Acute on chronic diastolic heart failure (H); Type 2 diabetes mellitus with hypoglycemia without coma, without long-term current use of insulin (H)      magnesium oxide (MAG-OX) 400 MG tablet Take 1 tablet (400 mg) by mouth daily  Qty: 30 tablet, Refills: 0    Associated Diagnoses: Atrial fibrillation, unspecified type (H)      meclizine (ANTIVERT) 25 MG  tablet TAKE 1 TABLET(25 MG) BY MOUTH THREE TIMES DAILY AS NEEDED FOR DIZZINESS OR NAUSEA  Qty: 45 tablet, Refills: 5    Associated Diagnoses: Vertigo      metoprolol tartrate (LOPRESSOR) 25 MG tablet TAKE 1 TABLET(25 MG) BY MOUTH TWICE DAILY  Qty: 180 tablet, Refills: 2    Associated Diagnoses: Paroxysmal atrial fibrillation (H)      naloxone (NARCAN) 4 MG/0.1ML nasal spray Spray 1 spray (4 mg) into one nostril alternating nostrils once as needed for opioid reversal every 2-3 minutes until assistance arrives  Qty: 0.2 mL, Refills: 0    Associated Diagnoses: Trigger point of thoracic region; Chronic pain syndrome      nystatin (NYSTOP) 461098 UNIT/GM external powder APPLY TOPICALLY TO THE AFFECTED AREA 2-3 TIMES DAILY AS NEEDED  Qty: 60 g, Refills: 3    Associated Diagnoses: Candidal intertrigo      omeprazole (PRILOSEC) 20 MG DR capsule TAKE 1 CAPSULE(20 MG) BY MOUTH TWICE DAILY  Qty: 180 capsule, Refills: 1    Associated Diagnoses: Type 2 diabetes mellitus with hypoglycemia without coma, without long-term current use of insulin (H)      oxyCODONE IR (ROXICODONE) 10 MG tablet Take 1 tablet (10 mg) by mouth every 6 hours as needed for moderate to severe pain  Qty: 120 tablet, Refills: 0    Associated Diagnoses: Chronic pain syndrome; Fibromyalgia      potassium chloride ER (KLOR-CON M) 20 MEQ CR tablet TAKE 1 TABLET(20 MEQ) BY MOUTH DAILY  Qty: 90 tablet, Refills: 3    Associated Diagnoses: Acute and chronic respiratory failure with hypercapnia (H)      sucralfate (CARAFATE) 1 GM tablet CRUSH ONE TABLET AND MIX WITH A LLITTLE WATER AND SWALLOW FOUR TIMES DAILY  Qty: 360 tablet, Refills: 3    Associated Diagnoses: Iron deficiency anemia due to chronic blood loss      SYMBICORT 160-4.5 MCG/ACT Inhaler INHALE 2 PUFFS INTO THE LUNGS TWICE DAILY  Qty: 10.2 g, Refills: 4    Associated Diagnoses: Pulmonary emphysema, unspecified emphysema type (H)      ACCU-CHEK SOFTCLIX LANCETS lancets [ACCU-CHEK SOFTCLIX LANCETS LANCETS]  "TEST THREE TIMES DAILY  Qty: 300 each, Refills: 3    Associated Diagnoses: Type 2 diabetes mellitus with hyperglycemia (H)      BD ULTRA-FINE SHORT PEN NEEDLE 31 gauge x 5/16\" Ndle [BD ULTRA-FINE SHORT PEN NEEDLE 31 GAUGE X 5/16\" NDLE] TEST FOUR TIMES DAILY WITH MEALS AND AT BEDTIME  Qty: 400 each, Refills: 3    Associated Diagnoses: DM (diabetes mellitus) (H)      !! blood-glucose meter (ONETOUCH VERIO IQ METER) Misc [BLOOD-GLUCOSE METER (ONETOUCH VERIO IQ METER) MISC] Check blood sugar three times a day.  Qty: 1 each, Refills: 0    Associated Diagnoses: Type 2 diabetes mellitus with hypoglycemia without coma, without long-term current use of insulin (H)      diaper,brief,adult,disposable (ADULT BRIEFS - LARGE) Misc [DIAPER,BRIEF,ADULT,DISPOSABLE (ADULT BRIEFS - LARGE) MISC] Use 3-4 daily as needed for incontinence  Qty: 120 each, Refills: 6    Associated Diagnoses: Mixed stress and urge urinary incontinence      Eucerin external lotion Use liberally to dry skin BID and prn.  Qty: 480 mL, Refills: 3    Associated Diagnoses: Dry skin      generic lancets (FINGERSTIX LANCETS) [GENERIC LANCETS (FINGERSTIX LANCETS)] Dispense brand per patient's insurance at pharmacy discretion.  Qty: 300 each, Refills: 0    Comments: Test three times daily.  Associated Diagnoses: Type 2 diabetes mellitus with hyperglycemia, unspecified whether long term insulin use (H)      Nutritional Supplements (ENSURE COMPLETE) LIQD Take 1 Can by mouth daily  Qty: 7110 mL, Refills: 4    Associated Diagnoses: Severe protein-calorie malnutrition (H24)      !! ONETOUCH VERIO IQ test strip TEST THREE TIMES DAILY  Qty: 300 strip, Refills: 0    Associated Diagnoses: Type 2 diabetes mellitus with hypoglycemia without coma, without long-term current use of insulin (H)       !! - Potential duplicate medications found. Please discuss with provider.        STOP taking these medications       diltiazem ER COATED BEADS (CARDIZEM CD/CARTIA XT) 120 MG 24 hr " "capsule Comments:   Reason for Stopping:             Allergies   Allergies   Allergen Reactions    Celebrex [Celecoxib] Rash     patient had butterfly rash - \"lupus-like\"      Latex Rash     "

## 2024-02-10 NOTE — PLAN OF CARE
Problem: Adult Inpatient Plan of Care  Goal: Plan of Care Review  Description: The Plan of Care Review/Shift note should be completed every shift.  The Outcome Evaluation is a brief statement about your assessment that the patient is improving, declining, or no change.  This information will be displayed automatically on your shift  note.  Outcome: Progressing     Problem: Adult Inpatient Plan of Care  Goal: Absence of Hospital-Acquired Illness or Injury  Intervention: Identify and Manage Fall Risk  Recent Flowsheet Documentation  Taken 2/9/2024 1650 by Jaquelin Alexandra, RN  Safety Promotion/Fall Prevention:   activity supervised   clutter free environment maintained   lighting adjusted   nonskid shoes/slippers when out of bed   patient and family education   room door open   room near nurse's station   room organization consistent   safety round/check completed   supervised activity     Problem: Adult Inpatient Plan of Care  Goal: Optimal Comfort and Wellbeing  Intervention: Monitor Pain and Promote Comfort  Recent Flowsheet Documentation  Taken 2/9/2024 2109 by Jaquelin Alexandra, RN  Pain Management Interventions: medication (see MAR)  Taken 2/9/2024 1650 by Jaquelin Alexandra, RN  Pain Management Interventions: medication (see MAR)   Goal Outcome Evaluation:    A & O x 4, VSS, on 3 L NC, med/surg. Pt reported pain in her left arm from angio yesterday, tylenol administered and effective per pt. Blood sugars elevated and covered. Pt hoping to go home tomorrow, her PCA, Cynthia, will provide transport at 5 PM if pt is to be discharged. Pleasant and cooperative.

## 2024-02-13 ENCOUNTER — TELEPHONE (OUTPATIENT)
Dept: CARDIOLOGY | Facility: CLINIC | Age: 74
End: 2024-02-13
Payer: COMMERCIAL

## 2024-02-13 DIAGNOSIS — I35.0 SEVERE AORTIC STENOSIS: Primary | ICD-10-CM

## 2024-02-13 NOTE — TELEPHONE ENCOUNTER
Criteria for moderate sedation  Mallampati of 4: no, 1  Transfemoral Access: yes   History of (If yes to any, proceed with MAC):   -Adverse anesthesia events: no   -BMI >43: no   -Intolerable chronic pain: no   -History of difficult airway: no   -History of being unable to tolerate moderate sedation: no   -Valve-in-valve (Valve other than Trifecta): no  -Any considerations: pt with significant COPD, recent hospitalization for COPD exacerbation with chronic hypoxic respiratory failure on home O2, recurrent pleural effusion s/p thoracentesis, pulmonary HTN, current smoker    Unless otherwise determined by structural, anesthesia or cath lab teams, proceed with TAVR under TBD.

## 2024-02-13 NOTE — TELEPHONE ENCOUNTER
From: Moises Valencia MD  Sent: 2/13/2024  11:06 AM CST  To: Jennifer Ospina RN    Given bad lung disease, let's do MAC

## 2024-02-13 NOTE — TELEPHONE ENCOUNTER
Spoke with the pt regarding scheduling TAVR. Pt will have TAVR on 2/20 with MAC. Discussed the pre and post procedure expectations. Pt was instructed to hold eliquis and jardiance on Sunday 2/18. Will have Ammy call to setup. Patient verbalizes understanding and agrees with plan. No further questions or concerns at this time.

## 2024-02-14 ENCOUNTER — TELEPHONE (OUTPATIENT)
Dept: FAMILY MEDICINE | Facility: CLINIC | Age: 74
End: 2024-02-14
Payer: COMMERCIAL

## 2024-02-14 LAB — BACTERIA PLR CULT: NORMAL

## 2024-02-14 NOTE — TELEPHONE ENCOUNTER
MTM referral from: Transitions of Care (recent hospital discharge or ED visit)    MTM referral outreach attempt #2 on February 14, 2024 at 2:16 PM      Outcome: Patient is not interested at this time, will route to MTM Pharmacist/Provider as an FYI. Thank you for the referral.     Use [facility] partners for the carrier/Plan on the flowsheet      Fadi Lovett [facility]

## 2024-02-16 DIAGNOSIS — I35.0 SEVERE AORTIC STENOSIS: Primary | ICD-10-CM

## 2024-02-16 DIAGNOSIS — R06.09 OTHER FORMS OF DYSPNEA: ICD-10-CM

## 2024-02-16 NOTE — TELEPHONE ENCOUNTER
TAVR plan per Dr. Valencia     adequate R TF access, annular area 430 - # [id number] w/ borderline LMT height of ~10 but large sinus, angle AP

## 2024-02-18 LAB
ABO/RH(D): NORMAL
ANTIBODY SCREEN: NEGATIVE
SPECIMEN EXPIRATION DATE: NORMAL

## 2024-02-19 ENCOUNTER — ANESTHESIA EVENT (OUTPATIENT)
Dept: CARDIOLOGY | Facility: HOSPITAL | Age: 74
DRG: 266 | End: 2024-02-19
Payer: COMMERCIAL

## 2024-02-19 ENCOUNTER — OFFICE VISIT (OUTPATIENT)
Dept: CARDIOLOGY | Facility: CLINIC | Age: 74
End: 2024-02-19
Payer: COMMERCIAL

## 2024-02-19 ENCOUNTER — ALLIED HEALTH/NURSE VISIT (OUTPATIENT)
Dept: CARDIOLOGY | Facility: CLINIC | Age: 74
End: 2024-02-19
Payer: COMMERCIAL

## 2024-02-19 ENCOUNTER — APPOINTMENT (OUTPATIENT)
Dept: CARDIOLOGY | Facility: CLINIC | Age: 74
End: 2024-02-19
Payer: COMMERCIAL

## 2024-02-19 ENCOUNTER — PREP FOR PROCEDURE (OUTPATIENT)
Dept: CARDIOLOGY | Facility: CLINIC | Age: 74
End: 2024-02-19

## 2024-02-19 VITALS
WEIGHT: 130 LBS | BODY MASS INDEX: 21.63 KG/M2 | HEART RATE: 59 BPM | RESPIRATION RATE: 16 BRPM | DIASTOLIC BLOOD PRESSURE: 73 MMHG | SYSTOLIC BLOOD PRESSURE: 117 MMHG

## 2024-02-19 DIAGNOSIS — F11.20 CONTINUOUS OPIOID DEPENDENCE (H): ICD-10-CM

## 2024-02-19 DIAGNOSIS — F17.219 CIGARETTE NICOTINE DEPENDENCE WITH NICOTINE-INDUCED DISORDER: ICD-10-CM

## 2024-02-19 DIAGNOSIS — I35.0 SEVERE AORTIC STENOSIS: Primary | ICD-10-CM

## 2024-02-19 DIAGNOSIS — I10 ESSENTIAL HYPERTENSION: ICD-10-CM

## 2024-02-19 DIAGNOSIS — R06.09 OTHER FORMS OF DYSPNEA: ICD-10-CM

## 2024-02-19 DIAGNOSIS — I48.0 PAROXYSMAL ATRIAL FIBRILLATION (H): ICD-10-CM

## 2024-02-19 DIAGNOSIS — I35.0 SEVERE AORTIC STENOSIS: ICD-10-CM

## 2024-02-19 DIAGNOSIS — I35.0 AORTIC VALVE STENOSIS, ETIOLOGY OF CARDIAC VALVE DISEASE UNSPECIFIED: Primary | ICD-10-CM

## 2024-02-19 LAB
ALBUMIN SERPL BCG-MCNC: 3.5 G/DL (ref 3.5–5.2)
ALP SERPL-CCNC: 100 U/L (ref 40–150)
ALT SERPL W P-5'-P-CCNC: 15 U/L (ref 0–50)
ANION GAP SERPL CALCULATED.3IONS-SCNC: 7 MMOL/L (ref 7–15)
AST SERPL W P-5'-P-CCNC: 18 U/L (ref 0–45)
BASOPHILS # BLD AUTO: 0.1 10E3/UL (ref 0–0.2)
BASOPHILS NFR BLD AUTO: 1 %
BILIRUB SERPL-MCNC: 0.4 MG/DL
BUN SERPL-MCNC: 14.1 MG/DL (ref 8–23)
CALCIUM SERPL-MCNC: 9.2 MG/DL (ref 8.8–10.2)
CHLORIDE SERPL-SCNC: 104 MMOL/L (ref 98–107)
CREAT SERPL-MCNC: 0.65 MG/DL (ref 0.51–0.95)
DEPRECATED HCO3 PLAS-SCNC: 31 MMOL/L (ref 22–29)
EGFRCR SERPLBLD CKD-EPI 2021: >90 ML/MIN/1.73M2
EOSINOPHIL # BLD AUTO: 0.2 10E3/UL (ref 0–0.7)
EOSINOPHIL NFR BLD AUTO: 2 %
ERYTHROCYTE [DISTWIDTH] IN BLOOD BY AUTOMATED COUNT: 14.4 % (ref 10–15)
GLUCOSE SERPL-MCNC: 101 MG/DL (ref 70–99)
HCT VFR BLD AUTO: 43.1 % (ref 35–47)
HGB BLD-MCNC: 13.4 G/DL (ref 11.7–15.7)
IMM GRANULOCYTES # BLD: 0.1 10E3/UL
IMM GRANULOCYTES NFR BLD: 1 %
INR PPP: 1.04 (ref 0.85–1.15)
LYMPHOCYTES # BLD AUTO: 2.1 10E3/UL (ref 0.8–5.3)
LYMPHOCYTES NFR BLD AUTO: 27 %
MAGNESIUM SERPL-MCNC: 2.2 MG/DL (ref 1.7–2.3)
MCH RBC QN AUTO: 29.9 PG (ref 26.5–33)
MCHC RBC AUTO-ENTMCNC: 31.1 G/DL (ref 31.5–36.5)
MCV RBC AUTO: 96 FL (ref 78–100)
MONOCYTES # BLD AUTO: 0.6 10E3/UL (ref 0–1.3)
MONOCYTES NFR BLD AUTO: 8 %
NEUTROPHILS # BLD AUTO: 4.8 10E3/UL (ref 1.6–8.3)
NEUTROPHILS NFR BLD AUTO: 61 %
NRBC # BLD AUTO: 0 10E3/UL
NRBC BLD AUTO-RTO: 0 /100
NT-PROBNP SERPL-MCNC: 641 PG/ML (ref 0–900)
PLATELET # BLD AUTO: 217 10E3/UL (ref 150–450)
POTASSIUM SERPL-SCNC: 5.1 MMOL/L (ref 3.4–5.3)
PROT SERPL-MCNC: 7.1 G/DL (ref 6.4–8.3)
RBC # BLD AUTO: 4.48 10E6/UL (ref 3.8–5.2)
SODIUM SERPL-SCNC: 142 MMOL/L (ref 135–145)
WBC # BLD AUTO: 7.7 10E3/UL (ref 4–11)

## 2024-02-19 PROCEDURE — 85610 PROTHROMBIN TIME: CPT | Performed by: INTERNAL MEDICINE

## 2024-02-19 PROCEDURE — 86901 BLOOD TYPING SEROLOGIC RH(D): CPT | Performed by: INTERNAL MEDICINE

## 2024-02-19 PROCEDURE — 36415 COLL VENOUS BLD VENIPUNCTURE: CPT | Performed by: INTERNAL MEDICINE

## 2024-02-19 PROCEDURE — 83735 ASSAY OF MAGNESIUM: CPT | Performed by: INTERNAL MEDICINE

## 2024-02-19 PROCEDURE — 86900 BLOOD TYPING SEROLOGIC ABO: CPT | Performed by: INTERNAL MEDICINE

## 2024-02-19 PROCEDURE — 80053 COMPREHEN METABOLIC PANEL: CPT | Performed by: INTERNAL MEDICINE

## 2024-02-19 PROCEDURE — 86850 RBC ANTIBODY SCREEN: CPT | Performed by: INTERNAL MEDICINE

## 2024-02-19 PROCEDURE — 93000 ELECTROCARDIOGRAM COMPLETE: CPT | Performed by: INTERNAL MEDICINE

## 2024-02-19 PROCEDURE — 99215 OFFICE O/P EST HI 40 MIN: CPT | Performed by: INTERNAL MEDICINE

## 2024-02-19 PROCEDURE — 99207 PR NO CHARGE LOS: CPT

## 2024-02-19 PROCEDURE — G2211 COMPLEX E/M VISIT ADD ON: HCPCS | Performed by: INTERNAL MEDICINE

## 2024-02-19 PROCEDURE — 85025 COMPLETE CBC W/AUTO DIFF WBC: CPT | Performed by: INTERNAL MEDICINE

## 2024-02-19 PROCEDURE — 83880 ASSAY OF NATRIURETIC PEPTIDE: CPT | Performed by: INTERNAL MEDICINE

## 2024-02-19 RX ORDER — SODIUM CHLORIDE 9 MG/ML
INJECTION, SOLUTION INTRAVENOUS CONTINUOUS
Status: CANCELLED | OUTPATIENT
Start: 2024-02-19

## 2024-02-19 RX ORDER — LIDOCAINE 40 MG/G
CREAM TOPICAL
Status: CANCELLED | OUTPATIENT
Start: 2024-02-19

## 2024-02-19 RX ORDER — NICOTINE POLACRILEX 4 MG
15-30 LOZENGE BUCCAL
Status: CANCELLED | OUTPATIENT
Start: 2024-02-19

## 2024-02-19 RX ORDER — DEXTROSE MONOHYDRATE 25 G/50ML
25-50 INJECTION, SOLUTION INTRAVENOUS
Status: CANCELLED | OUTPATIENT
Start: 2024-02-19

## 2024-02-19 RX ORDER — ASPIRIN 325 MG
325 TABLET ORAL ONCE
Status: CANCELLED | OUTPATIENT
Start: 2024-02-19 | End: 2024-02-19

## 2024-02-19 RX ORDER — CEFAZOLIN SODIUM 2 G/100ML
2 INJECTION, SOLUTION INTRAVENOUS
Status: CANCELLED | OUTPATIENT
Start: 2024-02-19

## 2024-02-19 NOTE — PATIENT INSTRUCTIONS
Mary Kay Tejada,    It was a pleasure to see you today in the clinic regarding your upcoming TAVR.     My recommendations after this visit include:     - present to [facility] tomorrow morning at the instructed time      If you have questions or concerns, please call using the numbers below:    Valve Clinic Phone   [phone]    After Hours/Scheduling  [phone]    Otherwise you can dial the nurse directly at:    Jennifer Ospina RN  [phone]    Jaime Baez RN  [phone]

## 2024-02-19 NOTE — LETTER
2/19/2024    SAVANA SILVER MD  [address] [place] [id number]    RE: Mary Kay Tejada       Dear Colleague,     I had the pleasure of seeing Mary Kay Tejada in the [facility].  HEART CARE ENCOUNTER NOTE       [facility]  [phone]    Assessment/Recommendations   Problem List Items Addressed This Visit       Essential hypertension    Paroxysmal Atrial Fibrillation    Aortic valve stenosis, etiology of cardiac valve disease unspecified - Primary    Nicotine dependence    F11.2 - Continuous opioid dependence (H)     Other Visit Diagnoses       Severe aortic stenosis        Other forms of dyspnea                1.  Aortic Stenosis: This has become severe and is now causing dyspnea on exertion and decreased exercise tolerance over the past 3 to 9 months.  She has seen a decline in her functional status.  She has been evaluated by a multidisciplinary team and has been deemed a good candidate for transcatheter aortic valve replacement.  She has now undergone all the required workup for TAVR and wishes to proceed.   We discussed the procedure in detail,  including post-procedure care, restrictions and follow up.   She understands that there is a 4-5% risk of bleeding, infection, stroke, heart block requiring permanent pacemaker, cardiac perforation, aortic root rupture, dissection and death.  Patient also understands that if something unexpected happens, the procedure may need to be stopped or changed to help her.     All questions were answered   Consents were signed and witnessed by me.  She is not a candidate for sternotomy or CPB in the rare event of aortic dissection or aortic rupture.  She consents to peripheral vessel repair or subxiphoid window  She has never had trouble with anesthesia in the past.    Labs today reveal Hgb 13.4, normal WBC, electrolytes WNL and creat 0.65    The plan will be for MAC.  We will use the Douglas Mai valve. Mary Kay Tejada  will report tomorrow morning for the procedure and all instructions regarding times and medications were given by TAVR coordinator RN.     2.  Chronic diastolic heart failure, NYHA class III -currently compensated.  NT proBNP today is 641.  Continue furosemide and Jardiance.  Blood pressure is well-controlled as first-line therapy    3.  Hypertension -blood pressure today is at goal.  Continue metoprolol tartrate 25 mg twice daily, furosemide 10 mg daily    4.  Paroxysmal atrial fibrillation - s/p PVI in 2012 and currently in normal sinus rhythm.  Eliquis is on hold for anticipation of procedure.    Continue metoprolol and digoxin    5.  Type II by diabetes mellitus -on Jardiance.  Last hemoglobin A1c was 6.2    6.  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  Continue atorvastatin and metoprolol    7.  COPD -patient continues to smoke 4 to 5 cigarettes/day.  Patient has smoked since the age of 13 with no plans to quit.  He is managed on Symbicort twice daily, as needed albuterol and DuoNebs    8.  Fibromyalgia/chronic pain -patient uses as needed oxycodone, but does not take on a daily basis.  Also takes gabapentin    The longitudinal plan of care for severe aortic stenosis and heart failure was addressed during this visit.  Due to added complexity of care, we will continue to support Mary Kay and the subsequent management of this condition(s) and with the ongoing continuity of care of this condition(s).         History of Present Illness/Subjective    Mary Kay Tejada is a 74 year old female who comes in today for history and physical prior to TAVR (transcatheter aortic valve replacement).       Mary Kay has past medical history significant for COPD on nocturnal oxygen (2.5 to 3 L), ongoing tobacco abuse, severe aortic stenosis, coronary artery disease, atrial fibrillation/flutter, osteoarthritis, fibromyalgia/chronic pain, GERD and recurrent pleural effusions.  She was admitted to the  hospital about a week ago with dizziness and shortness of breath and was treated for COPD exacerbation.  She underwent thoracentesis yielding 1 L of clear yellow fluid and had significant improvement in her respiratory status.  She has now finished a course of Augmentin and was started on furosemide during hospitalization.  She says her breathing has been stable since being home from the hospital.  She continues to smoke ~ 4 cigarettes per day.    Mary Kay Tejada has chronic shortness of breath and this did get better after thoracentesis earlier this month.  She also has dizziness.  She denies chest discomfort, palpitations, paroxysmal nocturnal dyspnea, orthopnea,pre-syncope, or syncope, weight loss, changes in appetite, nausea or vomiting.     Medical, surgical, family, social history, and medications were reviewed and updated as necessary.    ECG (personally reviewed & interpreted): 2/19/2024 shows sinus bradycardia with ventricular rate 59 bpm and incomplete RBBB.  NH interval 170 ms and QRS duration 106 ms    ECHOCARDIOGRAM done on May 25, 2023:  Interpretation Summary     Compared to the prior study dated 1/11/23, there are changes as noted. Aortic  stenosis is now severe.  The left ventricle is normal in size. Left ventricular function is normal.The  ejection fraction is 60-65%.  The right ventricle is mildly dilated.  The left atrium is mildly dilated.  Severe valvular aortic stenosis. There is mild to moderate (1-2+) aortic  regurgitation.  Small pericardial effusion    CATH done on 2/8/2024:  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), low-normal CO/CI by Ronit       Findings:  LM:no obstruction   LAD:mid-vessel 40% narrowing, D1 w/ 40% ostial narrowing  Lcx:mildly irregular  RCA:dominant, mildly irregular     LVEDP:16  AV gradient (mean) by pullback): 23     RHC:  RA:7   RV:55/4  PA:58/18 (33) (28)  PCWP:unable to  wedge  SvO2:66.5        CO/CI:  Ronit:4.43/2.15       Physical Examination Review of Systems   Vitals: /73 (BP Location: Left arm, Patient Position: Sitting, Cuff Size: Adult Regular)   Pulse 59   Resp 16   Wt 59 kg (130 lb)   LMP  (LMP Unknown)   BMI 21.63 kg/m    BMI= Body mass index is 21.63 kg/m .  Wt Readings from Last 3 Encounters:   02/19/24 59 kg (130 lb)   02/10/24 59.6 kg (131 lb 8 oz)   01/29/24 54.9 kg (121 lb)       General Appearance:   Alert, cooperative and in no acute distress   ENT/Mouth: membranes moist, no oral lesions or bleeding gums.      EYES:  no scleral icterus, normal conjunctivae   Neck: no carotid bruits.  Thyroid not visualized   Chest/Lungs:   lungs are diminished bilaterally to auscultation, no rales or wheezing   Cardiovascular:   Regular. Normal first and second heart sounds with 1/6 systolic murmur.  No rubs or gallops; the carotid, radial and posterior tibial pulses are intact, no edema bilaterally    Abdomen:  Soft and nontender. Bowel sounds are present in all quadrants   Extremities: no cyanosis or clubbing   Skin: no xanthelasma, warm.    Neurologic: normal gait, normal  bilateral, no tremors   Psychiatric: Normal mood and affect       Please refer above for cardiac ROS details.      Medical History  Surgical History Family History Social History   Past Medical History:   Diagnosis Date    Anemia     Aortic stenosis     Atrial fibrillation (H)     Atrial flutter (H)     Benign neoplasm of adenomatous polyp     large intestine     Chronic constipation     Chronic pain syndrome     COPD (chronic obstructive pulmonary disease) (H)     Oxygen at night     Dependence on supplemental oxygen     Oxygen at noc, during the day as needed    Depression     Diabetes mellitus (H)     Dry eye syndrome     Fibromyalgia     Ganglion     left wrist    GERD (gastroesophageal reflux disease)     Hyperlipidemia     Hypertension     Hypokalemia     Infective otitis externa,  unspecified     Created by Conversion     Larynx edema     Lung disease     Malignant neoplasm of vulva (H)     Created by Conversion [facility] Annotation: Apr 17 2007  8:24AM - Cammy Bui:  resection per Dr. Alfonso Mane 9/06;  Replacement Utility updated for latest IMO load    Medial epicondylitis     Onychomycosis     Osteoarthritis     Peptic ulcer     Polyneuropathy     Vulvar malignant neoplasm (H)      Past Surgical History:   Procedure Laterality Date    BIOPSY BREAST Right     BIOPSY BREAST Right 01/28/2015    BIOPSY BREAST Right 1/28/2015    Procedure: RIGHT BREAST BIOPSY AFTER WIRE LOCALIZATION AT 0940;  Surgeon: Renée Soriano MD;  Location: [facility];  Service:     BIOPSY OF BREAST, INCISIONAL      Description: Incisional Breast Biopsy;  Recorded: 11/13/2007;  Comments: benign    COLONOSCOPY N/A 6/14/2019    Procedure: COLONOSCOPY;  Surgeon: Eduardo Mora MD;  Location: [facility];  Service: Gastroenterology    CV CORONARY ANGIOGRAM N/A 2/8/2024    Procedure: CV CORONARY ANGIOGRAM;  Surgeon: Moises Valencia MD;  Location: [facility] CV    CV LEFT HEART CATH N/A 2/8/2024    Procedure: Left Heart Catheterization;  Surgeon: Moises Valencia MD;  Location: [facility] LAB CV    CV RIGHT HEART CATH MEASUREMENTS RECORDED N/A 2/8/2024    Procedure: Right Heart Catheterization;  Surgeon: Moises Valencia MD;  Location: [facility] LAB CV    ESOPHAGOSCOPY, GASTROSCOPY, DUODENOSCOPY (EGD), COMBINED N/A 11/6/2018    Procedure: ESOPHAGOGASTRODUODENOSCOPY;  Surgeon: Lit Fernando MD;  Location: [facility];  Service:     HYSTERECTOMY      JOINT REPLACEMENT Left     TKA    PICC TRIPLE LUMEN PLACEMENT  1/12/2023         WI ABLATE HEART DYSRHYTHM FOCUS      Description: Catheter Ablation Atrial Fibrillation;  Recorded: 07/31/2012;  Comments: 7/24/12 PVI with Dr. Gardiner and nilay to all 5 pulm veins and CTI fl ablation line as well.    ZZC SUPRACERV  ABD HYSTERECTOMY      Description: Supracervical Hysterectomy;  Proc Date: 01/01/1985;  Comments: some cervix left!; ovaries intact; done for bleeding     Family History   Problem Relation Age of Onset    Heart Failure Mother     Cancer Other         paternal HX-laryngeal     Alcoholism Sister     No Known Problems Daughter     No Known Problems Maternal Grandmother     No Known Problems Maternal Grandfather     No Known Problems Paternal Grandmother     No Known Problems Paternal Grandfather     No Known Problems Maternal Aunt     No Known Problems Paternal Aunt     Alcoholism Sister     Alcoholism Brother     Alcoholism Father     Cancer Paternal Uncle         Gastric-Alcohol    Cancer Paternal Uncle         gastric-Alcohol    Hereditary Breast and Ovarian Cancer Syndrome No family hx of     Breast Cancer No family hx of     Colon Cancer No family hx of     Endometrial Cancer No family hx of     Ovarian Cancer No family hx of     Social History     Socioeconomic History    Marital status:      Spouse name: Not on file    Number of children: Not on file    Years of education: Not on file    Highest education level: Not on file   Occupational History    Not on file   Tobacco Use    Smoking status: Some Days     Packs/day: .25     Types: Cigarettes     Passive exposure: Never    Smokeless tobacco: Never    Tobacco comments:     seen by TTS inpatient on 3/31/22   Vaping Use    Vaping Use: Never used   Substance and Sexual Activity    Alcohol use: Yes     Comment: Alcoholic Drinks/day: very little    Drug use: No    Sexual activity: Not on file   Other Topics Concern    Not on file   Social History Narrative    Not on file     Social Determinants of Health     Financial Resource Strain: Low Risk  (12/26/2023)    Financial Resource Strain     Within the past 12 months, have you or your family members you live with been unable to get utilities (heat, electricity) when it was really needed?: No   Food Insecurity:  "Low Risk  (12/26/2023)    Food Insecurity     Within the past 12 months, did you worry that your food would run out before you got money to buy more?: No     Within the past 12 months, did the food you bought just not last and you didn t have money to get more?: No   Transportation Needs: Low Risk  (12/26/2023)    Transportation Needs     Within the past 12 months, has lack of transportation kept you from medical appointments, getting your medicines, non-medical meetings or appointments, work, or from getting things that you need?: No   Physical Activity: Not on file   Stress: Not on file   Social Connections: Not on file   Interpersonal Safety: Low Risk  (9/20/2023)    Interpersonal Safety     Do you feel physically and emotionally safe where you currently live?: Yes     Within the past 12 months, have you been hit, slapped, kicked or otherwise physically hurt by someone?: No     Within the past 12 months, have you been humiliated or emotionally abused in other ways by your partner or ex-partner?: No   Housing Stability: Low Risk  (12/26/2023)    Housing Stability     Do you have housing? : Yes     Are you worried about losing your housing?: No          Medications  Allergies   Current Outpatient Medications   Medication Sig Dispense Refill    ACCU-CHEK SOFTCLIX LANCETS lancets [ACCU-CHEK SOFTCLIX LANCETS LANCETS] TEST THREE TIMES DAILY 300 each 3    albuterol (PROAIR HFA/PROVENTIL HFA/VENTOLIN HFA) 108 (90 Base) MCG/ACT inhaler INHALE 2 PUFFS INTO THE LUNGS EVERY 4 HOURS AS NEEDED FOR WHEEZING 13.4 g 3    apixaban ANTICOAGULANT (ELIQUIS) 5 MG tablet Take 1 tablet (5 mg) by mouth 2 times daily 180 tablet 2    atorvastatin (LIPITOR) 20 MG tablet TAKE 1 TABLET(20 MG) BY MOUTH AT BEDTIME 90 tablet 3    BD ULTRA-FINE SHORT PEN NEEDLE 31 gauge x 5/16\" Ndle [BD ULTRA-FINE SHORT PEN NEEDLE 31 GAUGE X 5/16\" NDLE] TEST FOUR TIMES DAILY WITH MEALS AND AT BEDTIME 400 each 3    blood-glucose meter (ONETOUCH VERIO IQ METER) " Misc [BLOOD-GLUCOSE METER (ONETOUCH VERIO IQ METER) MISC] Check blood sugar three times a day. 1 each 0    diaper,brief,adult,disposable (ADULT BRIEFS - LARGE) Misc [DIAPER,BRIEF,ADULT,DISPOSABLE (ADULT BRIEFS - LARGE) MISC] Use 3-4 daily as needed for incontinence 120 each 6    digoxin (LANOXIN) 125 MCG tablet TAKE 1 TABLET(125 MCG) BY MOUTH DAILY 90 tablet 2    Eucerin external lotion Use liberally to dry skin BID and prn. 480 mL 3    furosemide (LASIX) 20 MG tablet Take 0.5 tablets (10 mg) by mouth daily 30 tablet 1    gabapentin (NEURONTIN) 600 MG tablet TAKE 1 TABLET(600 MG) BY MOUTH THREE TIMES DAILY 270 tablet 2    generic lancets (FINGERSTIX LANCETS) [GENERIC LANCETS (FINGERSTIX LANCETS)] Dispense brand per patient's insurance at pharmacy discretion. 300 each 0    ipratropium - albuterol 0.5 mg/2.5 mg/3 mL (DUONEB) 0.5-2.5 (3) MG/3ML neb solution Take 1 vial (3 mLs) by nebulization every 6 hours as needed for shortness of breath or wheezing 90 mL 0    JARDIANCE 10 MG TABS tablet TAKE 1 TABLET(10 MG) BY MOUTH DAILY 90 tablet 2    magnesium oxide (MAG-OX) 400 MG tablet Take 1 tablet (400 mg) by mouth daily 30 tablet 0    meclizine (ANTIVERT) 25 MG tablet TAKE 1 TABLET(25 MG) BY MOUTH THREE TIMES DAILY AS NEEDED FOR DIZZINESS OR NAUSEA 45 tablet 5    metoprolol tartrate (LOPRESSOR) 25 MG tablet TAKE 1 TABLET(25 MG) BY MOUTH TWICE DAILY 180 tablet 2    naloxone (NARCAN) 4 MG/0.1ML nasal spray Spray 1 spray (4 mg) into one nostril alternating nostrils once as needed for opioid reversal every 2-3 minutes until assistance arrives 0.2 mL 0    Nutritional Supplements (ENSURE COMPLETE) LIQD Take 1 Can by mouth daily 7110 mL 4    nystatin (NYSTOP) 902861 UNIT/GM external powder APPLY TOPICALLY TO THE AFFECTED AREA 2-3 TIMES DAILY AS NEEDED 60 g 3    omeprazole (PRILOSEC) 20 MG DR capsule TAKE 1 CAPSULE(20 MG) BY MOUTH TWICE DAILY 180 capsule 1    ONETOUCH VERIO IQ test strip TEST THREE TIMES DAILY 300 strip 0     "oxyCODONE IR (ROXICODONE) 10 MG tablet Take 1 tablet (10 mg) by mouth every 6 hours as needed for moderate to severe pain 120 tablet 0    potassium chloride ER (KLOR-CON M) 20 MEQ CR tablet TAKE 1 TABLET(20 MEQ) BY MOUTH DAILY 90 tablet 3    sucralfate (CARAFATE) 1 GM tablet CRUSH ONE TABLET AND MIX WITH A LLITTLE WATER AND SWALLOW FOUR TIMES DAILY 360 tablet 3    SYMBICORT 160-4.5 MCG/ACT Inhaler INHALE 2 PUFFS INTO THE LUNGS TWICE DAILY 10.2 g 4    Allergies   Allergen Reactions    Celebrex [Celecoxib] Rash     patient had butterfly rash - \"lupus-like\"      Latex Rash         Lab Results    Chemistry/lipid CBC Cardiac Enzymes/BNP/TSH/INR   Recent Labs   Lab Test 09/20/23  1136   CHOL 167   HDL 75   LDL 75   TRIG 83     Recent Labs   Lab Test 09/20/23  1136 07/25/22  1026 12/24/21  0756   LDL 75 53 77     Recent Labs   Lab Test 02/19/24  1340      POTASSIUM 5.1   CHLORIDE 104   CO2 31*   *   BUN 14.1   CR 0.65   GFRESTIMATED >90   JOEY 9.2     Recent Labs   Lab Test 02/19/24  1340 02/10/24  0425 02/08/24  1103   CR 0.65 0.74 0.78     Recent Labs   Lab Test 02/06/24  1953 09/20/23  1136 05/25/23  0507   A1C 6.2* 6.1* 6.1*    Recent Labs   Lab Test 02/19/24  1340   WBC 7.7   HGB 13.4   HCT 43.1   MCV 96        Recent Labs   Lab Test 02/19/24  1340 02/10/24  0425 02/07/24  0848   HGB 13.4 12.5 13.9    Recent Labs   Lab Test 05/24/23  1322 05/24/23  1015 02/08/23  0759   TROPONINI 0.07 0.08 0.09     Recent Labs   Lab Test 02/06/24  1953 06/02/23  0057 05/24/23  1015 02/08/23  0759   BNP  --  136* 176* 867*   NTBNPI 534  --   --   --      Recent Labs   Lab Test 05/24/23  1015   TSH 1.17     Recent Labs   Lab Test 02/19/24  1340 02/08/23  0759 01/18/23  0522   INR 1.04 1.34* 1.35*          49 minutes spent on the date of encounter doing education, consent signing, chart prep/review, review of test results, interpretation with above tests, patient visit, documentation, and discussion with family.  "     This note has been dictated using voice recognition software. Any grammatical or context distortions are unintentional and inherent to the software.                   Thank you for allowing me to participate in the care of your patient.      Sincerely,     HORTENCIA DONG PA-C     [facility]  cc:   No referring provider defined for this encounter.

## 2024-02-19 NOTE — PROGRESS NOTES
HEART CARE ENCOUNTER NOTE       [facility]  [phone]    Assessment/Recommendations   Problem List Items Addressed This Visit       Essential hypertension    Paroxysmal Atrial Fibrillation    Aortic valve stenosis, etiology of cardiac valve disease unspecified - Primary    Nicotine dependence    F11.2 - Continuous opioid dependence (H)     Other Visit Diagnoses       Severe aortic stenosis        Other forms of dyspnea                1.  Aortic Stenosis: This has become severe and is now causing dyspnea on exertion and decreased exercise tolerance over the past 3 to 9 months.  She has seen a decline in her functional status.  She has been evaluated by a multidisciplinary team and has been deemed a good candidate for transcatheter aortic valve replacement.  She has now undergone all the required workup for TAVR and wishes to proceed.   We discussed the procedure in detail,  including post-procedure care, restrictions and follow up.   She understands that there is a 4-5% risk of bleeding, infection, stroke, heart block requiring permanent pacemaker, cardiac perforation, aortic root rupture, dissection and death.  Patient also understands that if something unexpected happens, the procedure may need to be stopped or changed to help her.     All questions were answered   Consents were signed and witnessed by me.  She is not a candidate for sternotomy or CPB in the rare event of aortic dissection or aortic rupture.  She consents to peripheral vessel repair or subxiphoid window  She has never had trouble with anesthesia in the past.    Labs today reveal Hgb 13.4, normal WBC, electrolytes WNL and creat 0.65    The plan will be for MAC.  We will use the Douglas Mai valve. Mary Kaymarlene Tejada will report tomorrow morning for the procedure and all instructions regarding times and medications were given by TAVR coordinator RN.     2.  Chronic diastolic heart failure, NYHA class III -currently  compensated.  NT proBNP today is 641.  Continue furosemide and Jardiance.  Blood pressure is well-controlled as first-line therapy    3.  Hypertension -blood pressure today is at goal.  Continue metoprolol tartrate 25 mg twice daily, furosemide 10 mg daily    4.  Paroxysmal atrial fibrillation - s/p PVI in 2012 and currently in normal sinus rhythm.  Eliquis is on hold for anticipation of procedure.    Continue metoprolol and digoxin    5.  Type II by diabetes mellitus -on Jardiance.  Last hemoglobin A1c was 6.2    6.  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  Continue atorvastatin and metoprolol    7.  COPD -patient continues to smoke 4 to 5 cigarettes/day.  Patient has smoked since the age of 13 with no plans to quit.  He is managed on Symbicort twice daily, as needed albuterol and DuoNebs    8.  Fibromyalgia/chronic pain -patient uses as needed oxycodone, but does not take on a daily basis.  Also takes gabapentin    The longitudinal plan of care for severe aortic stenosis and heart failure was addressed during this visit.  Due to added complexity of care, we will continue to support Mary Kay and the subsequent management of this condition(s) and with the ongoing continuity of care of this condition(s).         History of Present Illness/Subjective    Mary Kay Tejada is a 74 year old female who comes in today for history and physical prior to TAVR (transcatheter aortic valve replacement).       Mary Kay has past medical history significant for COPD on nocturnal oxygen (2.5 to 3 L), ongoing tobacco abuse, severe aortic stenosis, coronary artery disease, atrial fibrillation/flutter, osteoarthritis, fibromyalgia/chronic pain, GERD and recurrent pleural effusions.  She was admitted to the hospital about a week ago with dizziness and shortness of breath and was treated for COPD exacerbation.  She underwent thoracentesis yielding 1 L of clear yellow fluid and had significant improvement in her  respiratory status.  She has now finished a course of Augmentin and was started on furosemide during hospitalization.  She says her breathing has been stable since being home from the hospital.  She continues to smoke ~ 4 cigarettes per day.    Mary Kay Tejada has chronic shortness of breath and this did get better after thoracentesis earlier this month.  She also has dizziness.  She denies chest discomfort, palpitations, paroxysmal nocturnal dyspnea, orthopnea,pre-syncope, or syncope, weight loss, changes in appetite, nausea or vomiting.     Medical, surgical, family, social history, and medications were reviewed and updated as necessary.    ECG (personally reviewed & interpreted): 2/19/2024 shows sinus bradycardia with ventricular rate 59 bpm and incomplete RBBB.  KY interval 170 ms and QRS duration 106 ms    ECHOCARDIOGRAM done on May 25, 2023:  Interpretation Summary     Compared to the prior study dated 1/11/23, there are changes as noted. Aortic  stenosis is now severe.  The left ventricle is normal in size. Left ventricular function is normal.The  ejection fraction is 60-65%.  The right ventricle is mildly dilated.  The left atrium is mildly dilated.  Severe valvular aortic stenosis. There is mild to moderate (1-2+) aortic  regurgitation.  Small pericardial effusion    CATH done on 2/8/2024:  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), low-normal CO/CI by Ronit       Findings:  LM:no obstruction   LAD:mid-vessel 40% narrowing, D1 w/ 40% ostial narrowing  Lcx:mildly irregular  RCA:dominant, mildly irregular     LVEDP:16  AV gradient (mean) by pullback): 23     RHC:  RA:7   RV:55/4  PA:58/18 (33) (28)  PCWP:unable to wedge  SvO2:66.5        CO/CI:  Ronit:4.43/2.15       Physical Examination Review of Systems   Vitals: /73 (BP Location: Left arm, Patient Position: Sitting, Cuff Size: Adult Regular)   Pulse 59   Resp 16    Wt 59 kg (130 lb)   LMP  (LMP Unknown)   BMI 21.63 kg/m    BMI= Body mass index is 21.63 kg/m .  Wt Readings from Last 3 Encounters:   02/19/24 59 kg (130 lb)   02/10/24 59.6 kg (131 lb 8 oz)   01/29/24 54.9 kg (121 lb)       General Appearance:   Alert, cooperative and in no acute distress   ENT/Mouth: membranes moist, no oral lesions or bleeding gums.      EYES:  no scleral icterus, normal conjunctivae   Neck: no carotid bruits.  Thyroid not visualized   Chest/Lungs:   lungs are diminished bilaterally to auscultation, no rales or wheezing   Cardiovascular:   Regular. Normal first and second heart sounds with 1/6 systolic murmur.  No rubs or gallops; the carotid, radial and posterior tibial pulses are intact, no edema bilaterally    Abdomen:  Soft and nontender. Bowel sounds are present in all quadrants   Extremities: no cyanosis or clubbing   Skin: no xanthelasma, warm.    Neurologic: normal gait, normal  bilateral, no tremors   Psychiatric: Normal mood and affect       Please refer above for cardiac ROS details.      Medical History  Surgical History Family History Social History   Past Medical History:   Diagnosis Date    Anemia     Aortic stenosis     Atrial fibrillation (H)     Atrial flutter (H)     Benign neoplasm of adenomatous polyp     large intestine     Chronic constipation     Chronic pain syndrome     COPD (chronic obstructive pulmonary disease) (H)     Oxygen at night     Dependence on supplemental oxygen     Oxygen at noc, during the day as needed    Depression     Diabetes mellitus (H)     Dry eye syndrome     Fibromyalgia     Ganglion     left wrist    GERD (gastroesophageal reflux disease)     Hyperlipidemia     Hypertension     Hypokalemia     Infective otitis externa, unspecified     Created by Conversion     Larynx edema     Lung disease     Malignant neoplasm of vulva (H)     Created by Conversion [facility] Annotation: Apr 17 2007  8:24AM - Cammy Bui:  resection per  Dr. Alfonso Maen 9/06;  Replacement Utility updated for latest IMO load    Medial epicondylitis     Onychomycosis     Osteoarthritis     Peptic ulcer     Polyneuropathy     Vulvar malignant neoplasm (H)      Past Surgical History:   Procedure Laterality Date    BIOPSY BREAST Right     BIOPSY BREAST Right 01/28/2015    BIOPSY BREAST Right 1/28/2015    Procedure: RIGHT BREAST BIOPSY AFTER WIRE LOCALIZATION AT 0940;  Surgeon: Renée Soriano MD;  Location: [facility];  Service:     BIOPSY OF BREAST, INCISIONAL      Description: Incisional Breast Biopsy;  Recorded: 11/13/2007;  Comments: benign    COLONOSCOPY N/A 6/14/2019    Procedure: COLONOSCOPY;  Surgeon: Eduardo Mora MD;  Location: [facility];  Service: Gastroenterology    CV CORONARY ANGIOGRAM N/A 2/8/2024    Procedure: CV CORONARY ANGIOGRAM;  Surgeon: Moises Valencia MD;  Location: [facility] CATH LAB CV    CV LEFT HEART CATH N/A 2/8/2024    Procedure: Left Heart Catheterization;  Surgeon: Moises Valencia MD;  Location: [facility] CATH LAB CV    CV RIGHT HEART CATH MEASUREMENTS RECORDED N/A 2/8/2024    Procedure: Right Heart Catheterization;  Surgeon: Moises Valencia MD;  Location: [facility] LAB CV    ESOPHAGOSCOPY, GASTROSCOPY, DUODENOSCOPY (EGD), COMBINED N/A 11/6/2018    Procedure: ESOPHAGOGASTRODUODENOSCOPY;  Surgeon: Lit Fernando MD;  Location: [facility];  Service:     HYSTERECTOMY      JOINT REPLACEMENT Left     TKA    PICC TRIPLE LUMEN PLACEMENT  1/12/2023         CO ABLATE HEART DYSRHYTHM FOCUS      Description: Catheter Ablation Atrial Fibrillation;  Recorded: 07/31/2012;  Comments: 7/24/12 PVI with Dr. Gardiner and nilay to all 5 pulm veins and CTI fl ablation line as well.    ZZC SUPRACERV ABD HYSTERECTOMY      Description: Supracervical Hysterectomy;  Proc Date: 01/01/1985;  Comments: some cervix left!; ovaries intact; done for bleeding     Family History   Problem Relation Age of Onset    Heart Failure Mother      Cancer Other         paternal HX-laryngeal     Alcoholism Sister     No Known Problems Daughter     No Known Problems Maternal Grandmother     No Known Problems Maternal Grandfather     No Known Problems Paternal Grandmother     No Known Problems Paternal Grandfather     No Known Problems Maternal Aunt     No Known Problems Paternal Aunt     Alcoholism Sister     Alcoholism Brother     Alcoholism Father     Cancer Paternal Uncle         Gastric-Alcohol    Cancer Paternal Uncle         gastric-Alcohol    Hereditary Breast and Ovarian Cancer Syndrome No family hx of     Breast Cancer No family hx of     Colon Cancer No family hx of     Endometrial Cancer No family hx of     Ovarian Cancer No family hx of     Social History     Socioeconomic History    Marital status:      Spouse name: Not on file    Number of children: Not on file    Years of education: Not on file    Highest education level: Not on file   Occupational History    Not on file   Tobacco Use    Smoking status: Some Days     Packs/day: .25     Types: Cigarettes     Passive exposure: Never    Smokeless tobacco: Never    Tobacco comments:     seen by TTS inpatient on 3/31/22   Vaping Use    Vaping Use: Never used   Substance and Sexual Activity    Alcohol use: Yes     Comment: Alcoholic Drinks/day: very little    Drug use: No    Sexual activity: Not on file   Other Topics Concern    Not on file   Social History Narrative    Not on file     Social Determinants of Health     Financial Resource Strain: Low Risk  (12/26/2023)    Financial Resource Strain     Within the past 12 months, have you or your family members you live with been unable to get utilities (heat, electricity) when it was really needed?: No   Food Insecurity: Low Risk  (12/26/2023)    Food Insecurity     Within the past 12 months, did you worry that your food would run out before you got money to buy more?: No     Within the past 12 months, did the food you bought just not last and  "you didn t have money to get more?: No   Transportation Needs: Low Risk  (12/26/2023)    Transportation Needs     Within the past 12 months, has lack of transportation kept you from medical appointments, getting your medicines, non-medical meetings or appointments, work, or from getting things that you need?: No   Physical Activity: Not on file   Stress: Not on file   Social Connections: Not on file   Interpersonal Safety: Low Risk  (9/20/2023)    Interpersonal Safety     Do you feel physically and emotionally safe where you currently live?: Yes     Within the past 12 months, have you been hit, slapped, kicked or otherwise physically hurt by someone?: No     Within the past 12 months, have you been humiliated or emotionally abused in other ways by your partner or ex-partner?: No   Housing Stability: Low Risk  (12/26/2023)    Housing Stability     Do you have housing? : Yes     Are you worried about losing your housing?: No          Medications  Allergies   Current Outpatient Medications   Medication Sig Dispense Refill    ACCU-CHEK SOFTCLIX LANCETS lancets [ACCU-CHEK SOFTCLIX LANCETS LANCETS] TEST THREE TIMES DAILY 300 each 3    albuterol (PROAIR HFA/PROVENTIL HFA/VENTOLIN HFA) 108 (90 Base) MCG/ACT inhaler INHALE 2 PUFFS INTO THE LUNGS EVERY 4 HOURS AS NEEDED FOR WHEEZING 13.4 g 3    apixaban ANTICOAGULANT (ELIQUIS) 5 MG tablet Take 1 tablet (5 mg) by mouth 2 times daily 180 tablet 2    atorvastatin (LIPITOR) 20 MG tablet TAKE 1 TABLET(20 MG) BY MOUTH AT BEDTIME 90 tablet 3    BD ULTRA-FINE SHORT PEN NEEDLE 31 gauge x 5/16\" Ndle [BD ULTRA-FINE SHORT PEN NEEDLE 31 GAUGE X 5/16\" NDLE] TEST FOUR TIMES DAILY WITH MEALS AND AT BEDTIME 400 each 3    blood-glucose meter (ONETOUCH VERIO IQ METER) Misc [BLOOD-GLUCOSE METER (ONETOUCH VERIO IQ METER) MISC] Check blood sugar three times a day. 1 each 0    diaper,brief,adult,disposable (ADULT BRIEFS - LARGE) Misc [DIAPER,BRIEF,ADULT,DISPOSABLE (ADULT BRIEFS - LARGE) MISC] Use " 3-4 daily as needed for incontinence 120 each 6    digoxin (LANOXIN) 125 MCG tablet TAKE 1 TABLET(125 MCG) BY MOUTH DAILY 90 tablet 2    Eucerin external lotion Use liberally to dry skin BID and prn. 480 mL 3    furosemide (LASIX) 20 MG tablet Take 0.5 tablets (10 mg) by mouth daily 30 tablet 1    gabapentin (NEURONTIN) 600 MG tablet TAKE 1 TABLET(600 MG) BY MOUTH THREE TIMES DAILY 270 tablet 2    generic lancets (FINGERSTIX LANCETS) [GENERIC LANCETS (FINGERSTIX LANCETS)] Dispense brand per patient's insurance at pharmacy discretion. 300 each 0    ipratropium - albuterol 0.5 mg/2.5 mg/3 mL (DUONEB) 0.5-2.5 (3) MG/3ML neb solution Take 1 vial (3 mLs) by nebulization every 6 hours as needed for shortness of breath or wheezing 90 mL 0    JARDIANCE 10 MG TABS tablet TAKE 1 TABLET(10 MG) BY MOUTH DAILY 90 tablet 2    magnesium oxide (MAG-OX) 400 MG tablet Take 1 tablet (400 mg) by mouth daily 30 tablet 0    meclizine (ANTIVERT) 25 MG tablet TAKE 1 TABLET(25 MG) BY MOUTH THREE TIMES DAILY AS NEEDED FOR DIZZINESS OR NAUSEA 45 tablet 5    metoprolol tartrate (LOPRESSOR) 25 MG tablet TAKE 1 TABLET(25 MG) BY MOUTH TWICE DAILY 180 tablet 2    naloxone (NARCAN) 4 MG/0.1ML nasal spray Spray 1 spray (4 mg) into one nostril alternating nostrils once as needed for opioid reversal every 2-3 minutes until assistance arrives 0.2 mL 0    Nutritional Supplements (ENSURE COMPLETE) LIQD Take 1 Can by mouth daily 7110 mL 4    nystatin (NYSTOP) 121464 UNIT/GM external powder APPLY TOPICALLY TO THE AFFECTED AREA 2-3 TIMES DAILY AS NEEDED 60 g 3    omeprazole (PRILOSEC) 20 MG DR capsule TAKE 1 CAPSULE(20 MG) BY MOUTH TWICE DAILY 180 capsule 1    ONETOUCH VERIO IQ test strip TEST THREE TIMES DAILY 300 strip 0    oxyCODONE IR (ROXICODONE) 10 MG tablet Take 1 tablet (10 mg) by mouth every 6 hours as needed for moderate to severe pain 120 tablet 0    potassium chloride ER (KLOR-CON M) 20 MEQ CR tablet TAKE 1 TABLET(20 MEQ) BY MOUTH DAILY 90  "tablet 3    sucralfate (CARAFATE) 1 GM tablet CRUSH ONE TABLET AND MIX WITH A LLITTLE WATER AND SWALLOW FOUR TIMES DAILY 360 tablet 3    SYMBICORT 160-4.5 MCG/ACT Inhaler INHALE 2 PUFFS INTO THE LUNGS TWICE DAILY 10.2 g 4    Allergies   Allergen Reactions    Celebrex [Celecoxib] Rash     patient had butterfly rash - \"lupus-like\"      Latex Rash         Lab Results    Chemistry/lipid CBC Cardiac Enzymes/BNP/TSH/INR   Recent Labs   Lab Test 09/20/23  1136   CHOL 167   HDL 75   LDL 75   TRIG 83     Recent Labs   Lab Test 09/20/23  1136 07/25/22  1026 12/24/21  0756   LDL 75 53 77     Recent Labs   Lab Test 02/19/24  1340      POTASSIUM 5.1   CHLORIDE 104   CO2 31*   *   BUN 14.1   CR 0.65   GFRESTIMATED >90   JOEY 9.2     Recent Labs   Lab Test 02/19/24  1340 02/10/24  0425 02/08/24  1103   CR 0.65 0.74 0.78     Recent Labs   Lab Test 02/06/24  1953 09/20/23  1136 05/25/23  0507   A1C 6.2* 6.1* 6.1*    Recent Labs   Lab Test 02/19/24  1340   WBC 7.7   HGB 13.4   HCT 43.1   MCV 96        Recent Labs   Lab Test 02/19/24  1340 02/10/24  0425 02/07/24  0848   HGB 13.4 12.5 13.9    Recent Labs   Lab Test 05/24/23  1322 05/24/23  1015 02/08/23  0759   TROPONINI 0.07 0.08 0.09     Recent Labs   Lab Test 02/06/24  1953 06/02/23  0057 05/24/23  1015 02/08/23  0759   BNP  --  136* 176* 867*   NTBNPI 534  --   --   --      Recent Labs   Lab Test 05/24/23  1015   TSH 1.17     Recent Labs   Lab Test 02/19/24  1340 02/08/23  0759 01/18/23  0522   INR 1.04 1.34* 1.35*          49 minutes spent on the date of encounter doing education, consent signing, chart prep/review, review of test results, interpretation with above tests, patient visit, documentation, and discussion with family.      This note has been dictated using voice recognition software. Any grammatical or context distortions are unintentional and inherent to the software.                 "

## 2024-02-19 NOTE — H&P (VIEW-ONLY)
HEART CARE ENCOUNTER NOTE       [facility]  [phone]    Assessment/Recommendations   Problem List Items Addressed This Visit       Essential hypertension    Paroxysmal Atrial Fibrillation    Aortic valve stenosis, etiology of cardiac valve disease unspecified - Primary    Nicotine dependence    F11.2 - Continuous opioid dependence (H)     Other Visit Diagnoses       Severe aortic stenosis        Other forms of dyspnea                1.  Aortic Stenosis: This has become severe and is now causing dyspnea on exertion and decreased exercise tolerance over the past 3 to 9 months.  She has seen a decline in her functional status.  She has been evaluated by a multidisciplinary team and has been deemed a good candidate for transcatheter aortic valve replacement.  She has now undergone all the required workup for TAVR and wishes to proceed.   We discussed the procedure in detail,  including post-procedure care, restrictions and follow up.   She understands that there is a 4-5% risk of bleeding, infection, stroke, heart block requiring permanent pacemaker, cardiac perforation, aortic root rupture, dissection and death.  Patient also understands that if something unexpected happens, the procedure may need to be stopped or changed to help her.     All questions were answered   Consents were signed and witnessed by me.  She is not a candidate for sternotomy or CPB in the rare event of aortic dissection or aortic rupture.  She consents to peripheral vessel repair or subxiphoid window  She has never had trouble with anesthesia in the past.    Labs today reveal Hgb 13.4, normal WBC, electrolytes WNL and creat 0.65    The plan will be for MAC.  We will use the Douglas Mai valve. Mary Kaymarlene Tejada will report tomorrow morning for the procedure and all instructions regarding times and medications were given by TAVR coordinator RN.     2.  Chronic diastolic heart failure, NYHA class III -currently  compensated.  NT proBNP today is 641.  Continue furosemide and Jardiance.  Blood pressure is well-controlled as first-line therapy    3.  Hypertension -blood pressure today is at goal.  Continue metoprolol tartrate 25 mg twice daily, furosemide 10 mg daily    4.  Paroxysmal atrial fibrillation - s/p PVI in 2012 and currently in normal sinus rhythm.  Eliquis is on hold for anticipation of procedure.    Continue metoprolol and digoxin    5.  Type II by diabetes mellitus -on Jardiance.  Last hemoglobin A1c was 6.2    6.  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  Continue atorvastatin and metoprolol    7.  COPD -patient continues to smoke 4 to 5 cigarettes/day.  Patient has smoked since the age of 13 with no plans to quit.  He is managed on Symbicort twice daily, as needed albuterol and DuoNebs    8.  Fibromyalgia/chronic pain -patient uses as needed oxycodone, but does not take on a daily basis.  Also takes gabapentin    The longitudinal plan of care for severe aortic stenosis and heart failure was addressed during this visit.  Due to added complexity of care, we will continue to support Mary Kay and the subsequent management of this condition(s) and with the ongoing continuity of care of this condition(s).         History of Present Illness/Subjective    Mary Kay Tejada is a 74 year old female who comes in today for history and physical prior to TAVR (transcatheter aortic valve replacement).       Mary Kay has past medical history significant for COPD on nocturnal oxygen (2.5 to 3 L), ongoing tobacco abuse, severe aortic stenosis, coronary artery disease, atrial fibrillation/flutter, osteoarthritis, fibromyalgia/chronic pain, GERD and recurrent pleural effusions.  She was admitted to the hospital about a week ago with dizziness and shortness of breath and was treated for COPD exacerbation.  She underwent thoracentesis yielding 1 L of clear yellow fluid and had significant improvement in her  respiratory status.  She has now finished a course of Augmentin and was started on furosemide during hospitalization.  She says her breathing has been stable since being home from the hospital.  She continues to smoke ~ 4 cigarettes per day.    Mary Kay Tejada has chronic shortness of breath and this did get better after thoracentesis earlier this month.  She also has dizziness.  She denies chest discomfort, palpitations, paroxysmal nocturnal dyspnea, orthopnea,pre-syncope, or syncope, weight loss, changes in appetite, nausea or vomiting.     Medical, surgical, family, social history, and medications were reviewed and updated as necessary.    ECG (personally reviewed & interpreted): 2/19/2024 shows sinus bradycardia with ventricular rate 59 bpm and incomplete RBBB.  FL interval 170 ms and QRS duration 106 ms    ECHOCARDIOGRAM done on May 25, 2023:  Interpretation Summary     Compared to the prior study dated 1/11/23, there are changes as noted. Aortic  stenosis is now severe.  The left ventricle is normal in size. Left ventricular function is normal.The  ejection fraction is 60-65%.  The right ventricle is mildly dilated.  The left atrium is mildly dilated.  Severe valvular aortic stenosis. There is mild to moderate (1-2+) aortic  regurgitation.  Small pericardial effusion    CATH done on 2/8/2024:  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), low-normal CO/CI by Ronit       Findings:  LM:no obstruction   LAD:mid-vessel 40% narrowing, D1 w/ 40% ostial narrowing  Lcx:mildly irregular  RCA:dominant, mildly irregular     LVEDP:16  AV gradient (mean) by pullback): 23     RHC:  RA:7   RV:55/4  PA:58/18 (33) (28)  PCWP:unable to wedge  SvO2:66.5        CO/CI:  Ronit:4.43/2.15       Physical Examination Review of Systems   Vitals: /73 (BP Location: Left arm, Patient Position: Sitting, Cuff Size: Adult Regular)   Pulse 59   Resp 16    Wt 59 kg (130 lb)   LMP  (LMP Unknown)   BMI 21.63 kg/m    BMI= Body mass index is 21.63 kg/m .  Wt Readings from Last 3 Encounters:   02/19/24 59 kg (130 lb)   02/10/24 59.6 kg (131 lb 8 oz)   01/29/24 54.9 kg (121 lb)       General Appearance:   Alert, cooperative and in no acute distress   ENT/Mouth: membranes moist, no oral lesions or bleeding gums.      EYES:  no scleral icterus, normal conjunctivae   Neck: no carotid bruits.  Thyroid not visualized   Chest/Lungs:   lungs are diminished bilaterally to auscultation, no rales or wheezing   Cardiovascular:   Regular. Normal first and second heart sounds with 1/6 systolic murmur.  No rubs or gallops; the carotid, radial and posterior tibial pulses are intact, no edema bilaterally    Abdomen:  Soft and nontender. Bowel sounds are present in all quadrants   Extremities: no cyanosis or clubbing   Skin: no xanthelasma, warm.    Neurologic: normal gait, normal  bilateral, no tremors   Psychiatric: Normal mood and affect       Please refer above for cardiac ROS details.      Medical History  Surgical History Family History Social History   Past Medical History:   Diagnosis Date    Anemia     Aortic stenosis     Atrial fibrillation (H)     Atrial flutter (H)     Benign neoplasm of adenomatous polyp     large intestine     Chronic constipation     Chronic pain syndrome     COPD (chronic obstructive pulmonary disease) (H)     Oxygen at night     Dependence on supplemental oxygen     Oxygen at noc, during the day as needed    Depression     Diabetes mellitus (H)     Dry eye syndrome     Fibromyalgia     Ganglion     left wrist    GERD (gastroesophageal reflux disease)     Hyperlipidemia     Hypertension     Hypokalemia     Infective otitis externa, unspecified     Created by Conversion     Larynx edema     Lung disease     Malignant neoplasm of vulva (H)     Created by Conversion [facility] Annotation: Apr 17 2007  8:24AM - Cammy Bui:  resection per  Dr. Alfonso Mane 9/06;  Replacement Utility updated for latest IMO load    Medial epicondylitis     Onychomycosis     Osteoarthritis     Peptic ulcer     Polyneuropathy     Vulvar malignant neoplasm (H)      Past Surgical History:   Procedure Laterality Date    BIOPSY BREAST Right     BIOPSY BREAST Right 01/28/2015    BIOPSY BREAST Right 1/28/2015    Procedure: RIGHT BREAST BIOPSY AFTER WIRE LOCALIZATION AT 0940;  Surgeon: Renée Soriano MD;  Location: [facility] [place];  Service:     BIOPSY OF BREAST, INCISIONAL      Description: Incisional Breast Biopsy;  Recorded: 11/13/2007;  Comments: benign    COLONOSCOPY N/A 6/14/2019    Procedure: COLONOSCOPY;  Surgeon: Eduardo Mora MD;  Location: [facility] [place];  Service: Gastroenterology    CV CORONARY ANGIOGRAM N/A 2/8/2024    Procedure: CV CORONARY ANGIOGRAM;  Surgeon: Moises Valencia MD;  Location: [facility] CATH LAB CV    CV LEFT HEART CATH N/A 2/8/2024    Procedure: Left Heart Catheterization;  Surgeon: Moises Valencia MD;  Location: [facility] CATH LAB CV    CV RIGHT HEART CATH MEASUREMENTS RECORDED N/A 2/8/2024    Procedure: Right Heart Catheterization;  Surgeon: Moises Valencia MD;  Location: [facility] LAB CV    ESOPHAGOSCOPY, GASTROSCOPY, DUODENOSCOPY (EGD), COMBINED N/A 11/6/2018    Procedure: ESOPHAGOGASTRODUODENOSCOPY;  Surgeon: Lit Fernando MD;  Location: [facility] [place];  Service:     HYSTERECTOMY      JOINT REPLACEMENT Left     TKA    PICC TRIPLE LUMEN PLACEMENT  1/12/2023         CO ABLATE HEART DYSRHYTHM FOCUS      Description: Catheter Ablation Atrial Fibrillation;  Recorded: 07/31/2012;  Comments: 7/24/12 PVI with Dr. Gardiner and nilay to all 5 pulm veins and CTI fl ablation line as well.    ZZC SUPRACERV ABD HYSTERECTOMY      Description: Supracervical Hysterectomy;  Proc Date: 01/01/1985;  Comments: some cervix left!; ovaries intact; done for bleeding     Family History   Problem Relation Age of Onset    Heart Failure Mother      Cancer Other         paternal HX-laryngeal     Alcoholism Sister     No Known Problems Daughter     No Known Problems Maternal Grandmother     No Known Problems Maternal Grandfather     No Known Problems Paternal Grandmother     No Known Problems Paternal Grandfather     No Known Problems Maternal Aunt     No Known Problems Paternal Aunt     Alcoholism Sister     Alcoholism Brother     Alcoholism Father     Cancer Paternal Uncle         Gastric-Alcohol    Cancer Paternal Uncle         gastric-Alcohol    Hereditary Breast and Ovarian Cancer Syndrome No family hx of     Breast Cancer No family hx of     Colon Cancer No family hx of     Endometrial Cancer No family hx of     Ovarian Cancer No family hx of     Social History     Socioeconomic History    Marital status:      Spouse name: Not on file    Number of children: Not on file    Years of education: Not on file    Highest education level: Not on file   Occupational History    Not on file   Tobacco Use    Smoking status: Some Days     Packs/day: .25     Types: Cigarettes     Passive exposure: Never    Smokeless tobacco: Never    Tobacco comments:     seen by TTS inpatient on 3/31/22   Vaping Use    Vaping Use: Never used   Substance and Sexual Activity    Alcohol use: Yes     Comment: Alcoholic Drinks/day: very little    Drug use: No    Sexual activity: Not on file   Other Topics Concern    Not on file   Social History Narrative    Not on file     Social Determinants of Health     Financial Resource Strain: Low Risk  (12/26/2023)    Financial Resource Strain     Within the past 12 months, have you or your family members you live with been unable to get utilities (heat, electricity) when it was really needed?: No   Food Insecurity: Low Risk  (12/26/2023)    Food Insecurity     Within the past 12 months, did you worry that your food would run out before you got money to buy more?: No     Within the past 12 months, did the food you bought just not last and  "you didn t have money to get more?: No   Transportation Needs: Low Risk  (12/26/2023)    Transportation Needs     Within the past 12 months, has lack of transportation kept you from medical appointments, getting your medicines, non-medical meetings or appointments, work, or from getting things that you need?: No   Physical Activity: Not on file   Stress: Not on file   Social Connections: Not on file   Interpersonal Safety: Low Risk  (9/20/2023)    Interpersonal Safety     Do you feel physically and emotionally safe where you currently live?: Yes     Within the past 12 months, have you been hit, slapped, kicked or otherwise physically hurt by someone?: No     Within the past 12 months, have you been humiliated or emotionally abused in other ways by your partner or ex-partner?: No   Housing Stability: Low Risk  (12/26/2023)    Housing Stability     Do you have housing? : Yes     Are you worried about losing your housing?: No          Medications  Allergies   Current Outpatient Medications   Medication Sig Dispense Refill    ACCU-CHEK SOFTCLIX LANCETS lancets [ACCU-CHEK SOFTCLIX LANCETS LANCETS] TEST THREE TIMES DAILY 300 each 3    albuterol (PROAIR HFA/PROVENTIL HFA/VENTOLIN HFA) 108 (90 Base) MCG/ACT inhaler INHALE 2 PUFFS INTO THE LUNGS EVERY 4 HOURS AS NEEDED FOR WHEEZING 13.4 g 3    apixaban ANTICOAGULANT (ELIQUIS) 5 MG tablet Take 1 tablet (5 mg) by mouth 2 times daily 180 tablet 2    atorvastatin (LIPITOR) 20 MG tablet TAKE 1 TABLET(20 MG) BY MOUTH AT BEDTIME 90 tablet 3    BD ULTRA-FINE SHORT PEN NEEDLE 31 gauge x 5/16\" Ndle [BD ULTRA-FINE SHORT PEN NEEDLE 31 GAUGE X 5/16\" NDLE] TEST FOUR TIMES DAILY WITH MEALS AND AT BEDTIME 400 each 3    blood-glucose meter (ONETOUCH VERIO IQ METER) Misc [BLOOD-GLUCOSE METER (ONETOUCH VERIO IQ METER) MISC] Check blood sugar three times a day. 1 each 0    diaper,brief,adult,disposable (ADULT BRIEFS - LARGE) Misc [DIAPER,BRIEF,ADULT,DISPOSABLE (ADULT BRIEFS - LARGE) MISC] Use " 3-4 daily as needed for incontinence 120 each 6    digoxin (LANOXIN) 125 MCG tablet TAKE 1 TABLET(125 MCG) BY MOUTH DAILY 90 tablet 2    Eucerin external lotion Use liberally to dry skin BID and prn. 480 mL 3    furosemide (LASIX) 20 MG tablet Take 0.5 tablets (10 mg) by mouth daily 30 tablet 1    gabapentin (NEURONTIN) 600 MG tablet TAKE 1 TABLET(600 MG) BY MOUTH THREE TIMES DAILY 270 tablet 2    generic lancets (FINGERSTIX LANCETS) [GENERIC LANCETS (FINGERSTIX LANCETS)] Dispense brand per patient's insurance at pharmacy discretion. 300 each 0    ipratropium - albuterol 0.5 mg/2.5 mg/3 mL (DUONEB) 0.5-2.5 (3) MG/3ML neb solution Take 1 vial (3 mLs) by nebulization every 6 hours as needed for shortness of breath or wheezing 90 mL 0    JARDIANCE 10 MG TABS tablet TAKE 1 TABLET(10 MG) BY MOUTH DAILY 90 tablet 2    magnesium oxide (MAG-OX) 400 MG tablet Take 1 tablet (400 mg) by mouth daily 30 tablet 0    meclizine (ANTIVERT) 25 MG tablet TAKE 1 TABLET(25 MG) BY MOUTH THREE TIMES DAILY AS NEEDED FOR DIZZINESS OR NAUSEA 45 tablet 5    metoprolol tartrate (LOPRESSOR) 25 MG tablet TAKE 1 TABLET(25 MG) BY MOUTH TWICE DAILY 180 tablet 2    naloxone (NARCAN) 4 MG/0.1ML nasal spray Spray 1 spray (4 mg) into one nostril alternating nostrils once as needed for opioid reversal every 2-3 minutes until assistance arrives 0.2 mL 0    Nutritional Supplements (ENSURE COMPLETE) LIQD Take 1 Can by mouth daily 7110 mL 4    nystatin (NYSTOP) 512575 UNIT/GM external powder APPLY TOPICALLY TO THE AFFECTED AREA 2-3 TIMES DAILY AS NEEDED 60 g 3    omeprazole (PRILOSEC) 20 MG DR capsule TAKE 1 CAPSULE(20 MG) BY MOUTH TWICE DAILY 180 capsule 1    ONETOUCH VERIO IQ test strip TEST THREE TIMES DAILY 300 strip 0    oxyCODONE IR (ROXICODONE) 10 MG tablet Take 1 tablet (10 mg) by mouth every 6 hours as needed for moderate to severe pain 120 tablet 0    potassium chloride ER (KLOR-CON M) 20 MEQ CR tablet TAKE 1 TABLET(20 MEQ) BY MOUTH DAILY 90  "tablet 3    sucralfate (CARAFATE) 1 GM tablet CRUSH ONE TABLET AND MIX WITH A LLITTLE WATER AND SWALLOW FOUR TIMES DAILY 360 tablet 3    SYMBICORT 160-4.5 MCG/ACT Inhaler INHALE 2 PUFFS INTO THE LUNGS TWICE DAILY 10.2 g 4    Allergies   Allergen Reactions    Celebrex [Celecoxib] Rash     patient had butterfly rash - \"lupus-like\"      Latex Rash         Lab Results    Chemistry/lipid CBC Cardiac Enzymes/BNP/TSH/INR   Recent Labs   Lab Test 09/20/23  1136   CHOL 167   HDL 75   LDL 75   TRIG 83     Recent Labs   Lab Test 09/20/23  1136 07/25/22  1026 12/24/21  0756   LDL 75 53 77     Recent Labs   Lab Test 02/19/24  1340      POTASSIUM 5.1   CHLORIDE 104   CO2 31*   *   BUN 14.1   CR 0.65   GFRESTIMATED >90   JOEY 9.2     Recent Labs   Lab Test 02/19/24  1340 02/10/24  0425 02/08/24  1103   CR 0.65 0.74 0.78     Recent Labs   Lab Test 02/06/24  1953 09/20/23  1136 05/25/23  0507   A1C 6.2* 6.1* 6.1*    Recent Labs   Lab Test 02/19/24  1340   WBC 7.7   HGB 13.4   HCT 43.1   MCV 96        Recent Labs   Lab Test 02/19/24  1340 02/10/24  0425 02/07/24  0848   HGB 13.4 12.5 13.9    Recent Labs   Lab Test 05/24/23  1322 05/24/23  1015 02/08/23  0759   TROPONINI 0.07 0.08 0.09     Recent Labs   Lab Test 02/06/24  1953 06/02/23  0057 05/24/23  1015 02/08/23  0759   BNP  --  136* 176* 867*   NTBNPI 534  --   --   --      Recent Labs   Lab Test 05/24/23  1015   TSH 1.17     Recent Labs   Lab Test 02/19/24  1340 02/08/23  0759 01/18/23  0522   INR 1.04 1.34* 1.35*          49 minutes spent on the date of encounter doing education, consent signing, chart prep/review, review of test results, interpretation with above tests, patient visit, documentation, and discussion with family.      This note has been dictated using voice recognition software. Any grammatical or context distortions are unintentional and inherent to the software.                 "

## 2024-02-19 NOTE — PROGRESS NOTES
Patient in to see RN for Pre-TAVR visit on 2/19    All pre-procedure labs drawn: 2/19   EKG obtained: 2/19     Final STS score: 4%    5 Meter Walk: Pt unable to walk and in wheelchair    Labs reviewed: Pending    Patient instructed on medications:   Morning or procedure only take omeprazole, gabapentin and inhalers ok.  Continue to hold eliquis and jaurdiance    Loading dose of ASA: yes    Patient has advanced directive: yes    Does the patient have a PPM/ICD: no    Does the patient have any other implantable devices: no  If so:  Type of device and : n/a  Implanting/Managing Provider: n/a  Phone number to reach the : n/a      Education was given to patient regarding what to expect pre-procedure.     Patient was informed procedure will be done at [facility]: Main Entrance at [address] [place] [address]; [place] [address] and their arrival time is at 530am    TAVR consent signed at the time of the appt: yes    All questions were answered to family and patient by RN.    Patient and pts MARY James were present at the time of appointment.      Jennifer Ospina RN BSN  Structural Heart Coordinator  [facility]   [phone]

## 2024-02-20 ENCOUNTER — ANESTHESIA (OUTPATIENT)
Dept: CARDIOLOGY | Facility: HOSPITAL | Age: 74
DRG: 266 | End: 2024-02-20
Payer: COMMERCIAL

## 2024-02-20 ENCOUNTER — HOSPITAL ENCOUNTER (OUTPATIENT)
Dept: CARDIOLOGY | Facility: HOSPITAL | Age: 74
Discharge: HOME | DRG: 266 | End: 2024-02-20
Attending: INTERNAL MEDICINE | Admitting: INTERNAL MEDICINE
Payer: COMMERCIAL

## 2024-02-20 ENCOUNTER — SURGERY (OUTPATIENT)
Age: 74
End: 2024-02-20
Payer: COMMERCIAL

## 2024-02-20 ENCOUNTER — HOSPITAL ENCOUNTER (INPATIENT)
Facility: HOSPITAL | Age: 74
LOS: 3 days | Discharge: HOME | DRG: 266 | End: 2024-02-23
Attending: INTERNAL MEDICINE | Admitting: SURGERY
Payer: COMMERCIAL

## 2024-02-20 DIAGNOSIS — I48.91 ATRIAL FIBRILLATION, UNSPECIFIED TYPE (H): ICD-10-CM

## 2024-02-20 DIAGNOSIS — T14.8XXA OPEN WOUND: ICD-10-CM

## 2024-02-20 DIAGNOSIS — Z95.2 S/P TAVR (TRANSCATHETER AORTIC VALVE REPLACEMENT): Primary | ICD-10-CM

## 2024-02-20 DIAGNOSIS — I35.0 SEVERE AORTIC STENOSIS: ICD-10-CM

## 2024-02-20 DIAGNOSIS — I50.33 ACUTE ON CHRONIC DIASTOLIC HEART FAILURE (H): ICD-10-CM

## 2024-02-20 DIAGNOSIS — I35.0 AORTIC STENOSIS, SEVERE: ICD-10-CM

## 2024-02-20 LAB
ACT BLD: 286 SECONDS (ref 74–150)
ALBUMIN SERPL BCG-MCNC: 3 G/DL (ref 3.5–5.2)
ALP SERPL-CCNC: 92 U/L (ref 40–150)
ALT SERPL W P-5'-P-CCNC: 11 U/L (ref 0–50)
ANION GAP SERPL CALCULATED.3IONS-SCNC: 6 MMOL/L (ref 7–15)
AST SERPL W P-5'-P-CCNC: 18 U/L (ref 0–45)
ATRIAL RATE - MUSE: 59 BPM
ATRIAL RATE - MUSE: 84 BPM
BILIRUB SERPL-MCNC: 0.4 MG/DL
BLD PROD TYP BPU: NORMAL
BLD PROD TYP BPU: NORMAL
BLOOD COMPONENT TYPE: NORMAL
BLOOD COMPONENT TYPE: NORMAL
BUN SERPL-MCNC: 13.7 MG/DL (ref 8–23)
CALCIUM SERPL-MCNC: 8.8 MG/DL (ref 8.8–10.2)
CHLORIDE SERPL-SCNC: 105 MMOL/L (ref 98–107)
CODING SYSTEM: NORMAL
CODING SYSTEM: NORMAL
CREAT SERPL-MCNC: 0.67 MG/DL (ref 0.51–0.95)
CROSSMATCH: NORMAL
CROSSMATCH: NORMAL
DEPRECATED HCO3 PLAS-SCNC: 30 MMOL/L (ref 22–29)
DIASTOLIC BLOOD PRESSURE - MUSE: NORMAL MMHG
DIASTOLIC BLOOD PRESSURE - MUSE: NORMAL MMHG
EGFRCR SERPLBLD CKD-EPI 2021: >90 ML/MIN/1.73M2
ERYTHROCYTE [DISTWIDTH] IN BLOOD BY AUTOMATED COUNT: 14.4 % (ref 10–15)
GLUCOSE BLDC GLUCOMTR-MCNC: 119 MG/DL (ref 70–99)
GLUCOSE BLDC GLUCOMTR-MCNC: 254 MG/DL (ref 70–99)
GLUCOSE BLDC GLUCOMTR-MCNC: 261 MG/DL (ref 70–99)
GLUCOSE SERPL-MCNC: 91 MG/DL (ref 70–99)
HCT VFR BLD AUTO: 40.7 % (ref 35–47)
HGB BLD-MCNC: 12.9 G/DL (ref 11.7–15.7)
INTERPRETATION ECG - MUSE: NORMAL
INTERPRETATION ECG - MUSE: NORMAL
ISSUE DATE AND TIME: NORMAL
ISSUE DATE AND TIME: NORMAL
LVEF ECHO: NORMAL
MAGNESIUM SERPL-MCNC: 2.1 MG/DL (ref 1.7–2.3)
MCH RBC QN AUTO: 30.3 PG (ref 26.5–33)
MCHC RBC AUTO-ENTMCNC: 31.7 G/DL (ref 31.5–36.5)
MCV RBC AUTO: 96 FL (ref 78–100)
P AXIS - MUSE: 75 DEGREES
P AXIS - MUSE: 82 DEGREES
PLATELET # BLD AUTO: 207 10E3/UL (ref 150–450)
POTASSIUM SERPL-SCNC: 4.3 MMOL/L (ref 3.4–5.3)
PR INTERVAL - MUSE: 170 MS
PR INTERVAL - MUSE: 206 MS
PROCALCITONIN SERPL IA-MCNC: 0.04 NG/ML
PROT SERPL-MCNC: 6.5 G/DL (ref 6.4–8.3)
QRS DURATION - MUSE: 106 MS
QRS DURATION - MUSE: 132 MS
QT - MUSE: 372 MS
QT - MUSE: 374 MS
QTC - MUSE: 370 MS
QTC - MUSE: 439 MS
R AXIS - MUSE: -3 DEGREES
R AXIS - MUSE: -67 DEGREES
RBC # BLD AUTO: 4.26 10E6/UL (ref 3.8–5.2)
SODIUM SERPL-SCNC: 141 MMOL/L (ref 135–145)
SYSTOLIC BLOOD PRESSURE - MUSE: NORMAL MMHG
SYSTOLIC BLOOD PRESSURE - MUSE: NORMAL MMHG
T AXIS - MUSE: 171 DEGREES
T AXIS - MUSE: 79 DEGREES
UNIT ABO/RH: NORMAL
UNIT ABO/RH: NORMAL
UNIT NUMBER: NORMAL
UNIT NUMBER: NORMAL
UNIT STATUS: NORMAL
UNIT STATUS: NORMAL
UNIT TYPE ISBT: 5100
UNIT TYPE ISBT: 5100
VENTRICULAR RATE- MUSE: 59 BPM
VENTRICULAR RATE- MUSE: 84 BPM
WBC # BLD AUTO: 7.9 10E3/UL (ref 4–11)

## 2024-02-20 PROCEDURE — 250N000012 HC RX MED GY IP 250 OP 636 PS 637: Performed by: INTERNAL MEDICINE

## 2024-02-20 PROCEDURE — C1733 CATH, EP, OTHR THAN COOL-TIP: HCPCS | Performed by: INTERNAL MEDICINE

## 2024-02-20 PROCEDURE — 93321 DOPPLER ECHO F-UP/LMTD STD: CPT | Mod: 26 | Performed by: GENERAL ACUTE CARE HOSPITAL

## 2024-02-20 PROCEDURE — 86923 COMPATIBILITY TEST ELECTRIC: CPT | Performed by: INTERNAL MEDICINE

## 2024-02-20 PROCEDURE — 258N000003 HC RX IP 258 OP 636: Performed by: ANESTHESIOLOGY

## 2024-02-20 PROCEDURE — 250N000011 HC RX IP 250 OP 636: Performed by: NURSE ANESTHETIST, CERTIFIED REGISTERED

## 2024-02-20 PROCEDURE — 210N000001 HC R&B IMCU HEART CARE

## 2024-02-20 PROCEDURE — 250N000011 HC RX IP 250 OP 636: Performed by: INTERNAL MEDICINE

## 2024-02-20 PROCEDURE — 02RF38Z REPLACEMENT OF AORTIC VALVE WITH ZOOPLASTIC TISSUE, PERCUTANEOUS APPROACH: ICD-10-PCS | Performed by: INTERNAL MEDICINE

## 2024-02-20 PROCEDURE — 85014 HEMATOCRIT: CPT | Performed by: PHYSICIAN ASSISTANT

## 2024-02-20 PROCEDURE — 250N000009 HC RX 250: Performed by: NURSE ANESTHETIST, CERTIFIED REGISTERED

## 2024-02-20 PROCEDURE — C1769 GUIDE WIRE: HCPCS | Performed by: INTERNAL MEDICINE

## 2024-02-20 PROCEDURE — 36415 COLL VENOUS BLD VENIPUNCTURE: CPT | Performed by: PHYSICIAN ASSISTANT

## 2024-02-20 PROCEDURE — 93005 ELECTROCARDIOGRAM TRACING: CPT

## 2024-02-20 PROCEDURE — 250N000011 HC RX IP 250 OP 636: Performed by: ANESTHESIOLOGY

## 2024-02-20 PROCEDURE — 255N000002 HC RX 255 OP 636: Performed by: INTERNAL MEDICINE

## 2024-02-20 PROCEDURE — 84145 PROCALCITONIN (PCT): CPT | Performed by: INTERNAL MEDICINE

## 2024-02-20 PROCEDURE — 272N000001 HC OR GENERAL SUPPLY STERILE: Performed by: INTERNAL MEDICINE

## 2024-02-20 PROCEDURE — 33361 REPLACE AORTIC VALVE PERQ: CPT | Mod: 62 | Performed by: INTERNAL MEDICINE

## 2024-02-20 PROCEDURE — 370N000017 HC ANESTHESIA TECHNICAL FEE, PER MIN: Performed by: INTERNAL MEDICINE

## 2024-02-20 PROCEDURE — P9016 RBC LEUKOCYTES REDUCED: HCPCS | Performed by: INTERNAL MEDICINE

## 2024-02-20 PROCEDURE — C1894 INTRO/SHEATH, NON-LASER: HCPCS | Performed by: INTERNAL MEDICINE

## 2024-02-20 PROCEDURE — C1730 CATH, EP, 19 OR FEW ELECT: HCPCS | Performed by: INTERNAL MEDICINE

## 2024-02-20 PROCEDURE — 85347 COAGULATION TIME ACTIVATED: CPT

## 2024-02-20 PROCEDURE — 93308 TTE F-UP OR LMTD: CPT | Mod: 26 | Performed by: GENERAL ACUTE CARE HOSPITAL

## 2024-02-20 PROCEDURE — 93010 ELECTROCARDIOGRAM REPORT: CPT | Performed by: INTERNAL MEDICINE

## 2024-02-20 PROCEDURE — 250N000011 HC RX IP 250 OP 636

## 2024-02-20 PROCEDURE — 93325 DOPPLER ECHO COLOR FLOW MAPG: CPT

## 2024-02-20 PROCEDURE — C1760 CLOSURE DEV, VASC: HCPCS | Performed by: INTERNAL MEDICINE

## 2024-02-20 PROCEDURE — 99233 SBSQ HOSP IP/OBS HIGH 50: CPT | Performed by: INTERNAL MEDICINE

## 2024-02-20 PROCEDURE — 258N000003 HC RX IP 258 OP 636: Performed by: INTERNAL MEDICINE

## 2024-02-20 PROCEDURE — 93325 DOPPLER ECHO COLOR FLOW MAPG: CPT | Mod: 26 | Performed by: GENERAL ACUTE CARE HOSPITAL

## 2024-02-20 PROCEDURE — 250N000013 HC RX MED GY IP 250 OP 250 PS 637: Performed by: INTERNAL MEDICINE

## 2024-02-20 PROCEDURE — 83735 ASSAY OF MAGNESIUM: CPT | Performed by: PHYSICIAN ASSISTANT

## 2024-02-20 PROCEDURE — 80053 COMPREHEN METABOLIC PANEL: CPT | Performed by: PHYSICIAN ASSISTANT

## 2024-02-20 PROCEDURE — 250N000013 HC RX MED GY IP 250 OP 250 PS 637: Performed by: PHYSICIAN ASSISTANT

## 2024-02-20 PROCEDURE — 33361 REPLACE AORTIC VALVE PERQ: CPT | Performed by: INTERNAL MEDICINE

## 2024-02-20 PROCEDURE — 250N000009 HC RX 250: Performed by: INTERNAL MEDICINE

## 2024-02-20 PROCEDURE — 258N000003 HC RX IP 258 OP 636: Performed by: NURSE ANESTHETIST, CERTIFIED REGISTERED

## 2024-02-20 PROCEDURE — C1889 IMPLANT/INSERT DEVICE, NOC: HCPCS | Performed by: INTERNAL MEDICINE

## 2024-02-20 PROCEDURE — 33361 REPLACE AORTIC VALVE PERQ: CPT | Mod: 62 | Performed by: SURGERY

## 2024-02-20 DEVICE — DEVICE CLOSURE VASCULAR MANTA 18FR 2115: Type: IMPLANTABLE DEVICE | Status: FUNCTIONAL

## 2024-02-20 DEVICE — CLOSURE ANGIOSEAL 6FR 610130: Type: IMPLANTABLE DEVICE | Status: FUNCTIONAL

## 2024-02-20 DEVICE — VALVE AORTIC TRANSCATHETER HEART 26MM SAPIEN 3 ULTRA RESILIA: Type: IMPLANTABLE DEVICE | Site: CHEST | Status: FUNCTIONAL

## 2024-02-20 RX ORDER — NICOTINE POLACRILEX 4 MG
15-30 LOZENGE BUCCAL
Status: DISCONTINUED | OUTPATIENT
Start: 2024-02-20 | End: 2024-02-20 | Stop reason: HOSPADM

## 2024-02-20 RX ORDER — BUDESONIDE AND FORMOTEROL FUMARATE DIHYDRATE 160; 4.5 UG/1; UG/1
2 AEROSOL RESPIRATORY (INHALATION) 2 TIMES DAILY
Status: DISCONTINUED | OUTPATIENT
Start: 2024-02-20 | End: 2024-02-23 | Stop reason: HOSPADM

## 2024-02-20 RX ORDER — DEXTROSE MONOHYDRATE 25 G/50ML
25-50 INJECTION, SOLUTION INTRAVENOUS
Status: DISCONTINUED | OUTPATIENT
Start: 2024-02-20 | End: 2024-02-20 | Stop reason: HOSPADM

## 2024-02-20 RX ORDER — ASPIRIN 81 MG/1
81 TABLET ORAL DAILY
Status: DISCONTINUED | OUTPATIENT
Start: 2024-02-21 | End: 2024-02-23 | Stop reason: HOSPADM

## 2024-02-20 RX ORDER — NICOTINE 21 MG/24HR
1 PATCH, TRANSDERMAL 24 HOURS TRANSDERMAL DAILY
Status: DISCONTINUED | OUTPATIENT
Start: 2024-02-20 | End: 2024-02-23 | Stop reason: HOSPADM

## 2024-02-20 RX ORDER — OXYCODONE HYDROCHLORIDE 5 MG/1
10 TABLET ORAL
Status: DISCONTINUED | OUTPATIENT
Start: 2024-02-20 | End: 2024-02-20 | Stop reason: HOSPADM

## 2024-02-20 RX ORDER — CEFAZOLIN SODIUM/WATER 2 G/20 ML
SYRINGE (ML) INTRAVENOUS PRN
Status: DISCONTINUED | OUTPATIENT
Start: 2024-02-20 | End: 2024-02-20

## 2024-02-20 RX ORDER — NICOTINE POLACRILEX 4 MG
15-30 LOZENGE BUCCAL
Status: DISCONTINUED | OUTPATIENT
Start: 2024-02-20 | End: 2024-02-23 | Stop reason: HOSPADM

## 2024-02-20 RX ORDER — DILTIAZEM HYDROCHLORIDE 120 MG/1
120 CAPSULE, COATED, EXTENDED RELEASE ORAL DAILY
Status: ON HOLD | COMMUNITY
End: 2024-02-20

## 2024-02-20 RX ORDER — DEXMEDETOMIDINE HYDROCHLORIDE 4 UG/ML
.2-1 INJECTION, SOLUTION INTRAVENOUS CONTINUOUS
Status: DISCONTINUED | OUTPATIENT
Start: 2024-02-20 | End: 2024-02-20 | Stop reason: HOSPADM

## 2024-02-20 RX ORDER — OXYCODONE HYDROCHLORIDE 5 MG/1
5 TABLET ORAL EVERY 4 HOURS PRN
Status: DISCONTINUED | OUTPATIENT
Start: 2024-02-20 | End: 2024-02-23 | Stop reason: HOSPADM

## 2024-02-20 RX ORDER — ALBUTEROL SULFATE 90 UG/1
2 AEROSOL, METERED RESPIRATORY (INHALATION) EVERY 4 HOURS PRN
Status: DISCONTINUED | OUTPATIENT
Start: 2024-02-20 | End: 2024-02-23 | Stop reason: HOSPADM

## 2024-02-20 RX ORDER — ONDANSETRON 4 MG/1
4 TABLET, ORALLY DISINTEGRATING ORAL EVERY 6 HOURS PRN
Status: DISCONTINUED | OUTPATIENT
Start: 2024-02-20 | End: 2024-02-23 | Stop reason: HOSPADM

## 2024-02-20 RX ORDER — PANTOPRAZOLE SODIUM 40 MG/1
40 TABLET, DELAYED RELEASE ORAL 2 TIMES DAILY
Status: DISCONTINUED | OUTPATIENT
Start: 2024-02-20 | End: 2024-02-23 | Stop reason: HOSPADM

## 2024-02-20 RX ORDER — CEFAZOLIN SODIUM/WATER 2 G/20 ML
2 SYRINGE (ML) INTRAVENOUS
Status: DISCONTINUED | OUTPATIENT
Start: 2024-02-20 | End: 2024-02-20 | Stop reason: HOSPADM

## 2024-02-20 RX ORDER — DEXTROSE MONOHYDRATE 25 G/50ML
25-50 INJECTION, SOLUTION INTRAVENOUS
Status: DISCONTINUED | OUTPATIENT
Start: 2024-02-20 | End: 2024-02-20

## 2024-02-20 RX ORDER — DEXTROSE MONOHYDRATE 25 G/50ML
25-50 INJECTION, SOLUTION INTRAVENOUS
Status: DISCONTINUED | OUTPATIENT
Start: 2024-02-20 | End: 2024-02-23 | Stop reason: HOSPADM

## 2024-02-20 RX ORDER — FENTANYL CITRATE 50 UG/ML
25 INJECTION, SOLUTION INTRAMUSCULAR; INTRAVENOUS
Status: DISCONTINUED | OUTPATIENT
Start: 2024-02-20 | End: 2024-02-20 | Stop reason: HOSPADM

## 2024-02-20 RX ORDER — CEFTRIAXONE 1 G/1
1 INJECTION, POWDER, FOR SOLUTION INTRAMUSCULAR; INTRAVENOUS EVERY 24 HOURS
Status: DISCONTINUED | OUTPATIENT
Start: 2024-02-20 | End: 2024-02-21

## 2024-02-20 RX ORDER — NALOXONE HYDROCHLORIDE 0.4 MG/ML
0.2 INJECTION, SOLUTION INTRAMUSCULAR; INTRAVENOUS; SUBCUTANEOUS
Status: DISCONTINUED | OUTPATIENT
Start: 2024-02-20 | End: 2024-02-23 | Stop reason: HOSPADM

## 2024-02-20 RX ORDER — DIPHENHYDRAMINE HYDROCHLORIDE 50 MG/ML
INJECTION INTRAMUSCULAR; INTRAVENOUS PRN
Status: DISCONTINUED | OUTPATIENT
Start: 2024-02-20 | End: 2024-02-20

## 2024-02-20 RX ORDER — POTASSIUM CHLORIDE 1500 MG/1
20 TABLET, EXTENDED RELEASE ORAL DAILY
Status: DISCONTINUED | OUTPATIENT
Start: 2024-02-21 | End: 2024-02-23 | Stop reason: HOSPADM

## 2024-02-20 RX ORDER — FENTANYL CITRATE 50 UG/ML
INJECTION, SOLUTION INTRAMUSCULAR; INTRAVENOUS PRN
Status: DISCONTINUED | OUTPATIENT
Start: 2024-02-20 | End: 2024-02-20

## 2024-02-20 RX ORDER — ONDANSETRON 2 MG/ML
4 INJECTION INTRAMUSCULAR; INTRAVENOUS EVERY 6 HOURS PRN
Status: DISCONTINUED | OUTPATIENT
Start: 2024-02-20 | End: 2024-02-23 | Stop reason: HOSPADM

## 2024-02-20 RX ORDER — OXYCODONE HYDROCHLORIDE 5 MG/1
10 TABLET ORAL EVERY 4 HOURS PRN
Status: DISCONTINUED | OUTPATIENT
Start: 2024-02-20 | End: 2024-02-23 | Stop reason: HOSPADM

## 2024-02-20 RX ORDER — HEPARIN SODIUM 1000 [USP'U]/ML
INJECTION, SOLUTION INTRAVENOUS; SUBCUTANEOUS PRN
Status: DISCONTINUED | OUTPATIENT
Start: 2024-02-20 | End: 2024-02-20

## 2024-02-20 RX ORDER — HYDRALAZINE HYDROCHLORIDE 20 MG/ML
10 INJECTION INTRAMUSCULAR; INTRAVENOUS
Status: DISCONTINUED | OUTPATIENT
Start: 2024-02-20 | End: 2024-02-23 | Stop reason: HOSPADM

## 2024-02-20 RX ORDER — LIDOCAINE HYDROCHLORIDE 10 MG/ML
INJECTION, SOLUTION INFILTRATION; PERINEURAL PRN
Status: DISCONTINUED | OUTPATIENT
Start: 2024-02-20 | End: 2024-02-20

## 2024-02-20 RX ORDER — NITROGLYCERIN 0.4 MG/1
0.4 TABLET SUBLINGUAL EVERY 5 MIN PRN
Status: DISCONTINUED | OUTPATIENT
Start: 2024-02-20 | End: 2024-02-23 | Stop reason: HOSPADM

## 2024-02-20 RX ORDER — SODIUM CHLORIDE 9 MG/ML
INJECTION, SOLUTION INTRAVENOUS CONTINUOUS
Status: DISCONTINUED | OUTPATIENT
Start: 2024-02-20 | End: 2024-02-20 | Stop reason: HOSPADM

## 2024-02-20 RX ORDER — SODIUM CHLORIDE 9 MG/ML
INJECTION, SOLUTION INTRAVENOUS CONTINUOUS
Status: ACTIVE | OUTPATIENT
Start: 2024-02-20 | End: 2024-02-20

## 2024-02-20 RX ORDER — ONDANSETRON 4 MG/1
4 TABLET, ORALLY DISINTEGRATING ORAL EVERY 30 MIN PRN
Status: DISCONTINUED | OUTPATIENT
Start: 2024-02-20 | End: 2024-02-20 | Stop reason: HOSPADM

## 2024-02-20 RX ORDER — ASPIRIN 325 MG
325 TABLET ORAL ONCE
Status: COMPLETED | OUTPATIENT
Start: 2024-02-20 | End: 2024-02-20

## 2024-02-20 RX ORDER — NICOTINE POLACRILEX 4 MG
15-30 LOZENGE BUCCAL
Status: DISCONTINUED | OUTPATIENT
Start: 2024-02-20 | End: 2024-02-20

## 2024-02-20 RX ORDER — ATORVASTATIN CALCIUM 10 MG/1
20 TABLET, FILM COATED ORAL AT BEDTIME
Status: DISCONTINUED | OUTPATIENT
Start: 2024-02-20 | End: 2024-02-23 | Stop reason: HOSPADM

## 2024-02-20 RX ORDER — IODIXANOL 320 MG/ML
INJECTION, SOLUTION INTRAVASCULAR
Status: DISCONTINUED | OUTPATIENT
Start: 2024-02-20 | End: 2024-02-20 | Stop reason: HOSPADM

## 2024-02-20 RX ORDER — DIGOXIN 125 MCG
125 TABLET ORAL DAILY
Status: DISCONTINUED | OUTPATIENT
Start: 2024-02-21 | End: 2024-02-23 | Stop reason: HOSPADM

## 2024-02-20 RX ORDER — SUCRALFATE 1 G/1
1 TABLET ORAL
Status: DISCONTINUED | OUTPATIENT
Start: 2024-02-20 | End: 2024-02-23 | Stop reason: HOSPADM

## 2024-02-20 RX ORDER — SODIUM CHLORIDE 9 MG/ML
INJECTION, SOLUTION INTRAVENOUS CONTINUOUS PRN
Status: DISCONTINUED | OUTPATIENT
Start: 2024-02-20 | End: 2024-02-20

## 2024-02-20 RX ORDER — NALOXONE HYDROCHLORIDE 0.4 MG/ML
0.4 INJECTION, SOLUTION INTRAMUSCULAR; INTRAVENOUS; SUBCUTANEOUS
Status: DISCONTINUED | OUTPATIENT
Start: 2024-02-20 | End: 2024-02-23 | Stop reason: HOSPADM

## 2024-02-20 RX ORDER — IPRATROPIUM BROMIDE AND ALBUTEROL SULFATE 2.5; .5 MG/3ML; MG/3ML
3 SOLUTION RESPIRATORY (INHALATION) EVERY 6 HOURS PRN
Status: DISCONTINUED | OUTPATIENT
Start: 2024-02-20 | End: 2024-02-23 | Stop reason: HOSPADM

## 2024-02-20 RX ORDER — GABAPENTIN 300 MG/1
600 CAPSULE ORAL 3 TIMES DAILY
Status: DISCONTINUED | OUTPATIENT
Start: 2024-02-20 | End: 2024-02-23 | Stop reason: HOSPADM

## 2024-02-20 RX ORDER — FUROSEMIDE 20 MG/1
10 TABLET ORAL DAILY
Status: DISCONTINUED | OUTPATIENT
Start: 2024-02-21 | End: 2024-02-23 | Stop reason: HOSPADM

## 2024-02-20 RX ORDER — ACETAMINOPHEN 325 MG/1
650 TABLET ORAL EVERY 4 HOURS PRN
Status: DISCONTINUED | OUTPATIENT
Start: 2024-02-20 | End: 2024-02-23 | Stop reason: HOSPADM

## 2024-02-20 RX ORDER — LIDOCAINE 40 MG/G
CREAM TOPICAL
Status: DISCONTINUED | OUTPATIENT
Start: 2024-02-20 | End: 2024-02-20 | Stop reason: HOSPADM

## 2024-02-20 RX ORDER — ONDANSETRON 2 MG/ML
4 INJECTION INTRAMUSCULAR; INTRAVENOUS EVERY 30 MIN PRN
Status: DISCONTINUED | OUTPATIENT
Start: 2024-02-20 | End: 2024-02-20 | Stop reason: HOSPADM

## 2024-02-20 RX ORDER — OXYCODONE HYDROCHLORIDE 5 MG/1
5 TABLET ORAL
Status: DISCONTINUED | OUTPATIENT
Start: 2024-02-20 | End: 2024-02-20 | Stop reason: HOSPADM

## 2024-02-20 RX ORDER — SODIUM CHLORIDE, SODIUM LACTATE, POTASSIUM CHLORIDE, CALCIUM CHLORIDE 600; 310; 30; 20 MG/100ML; MG/100ML; MG/100ML; MG/100ML
INJECTION, SOLUTION INTRAVENOUS CONTINUOUS
Status: DISCONTINUED | OUTPATIENT
Start: 2024-02-20 | End: 2024-02-20 | Stop reason: HOSPADM

## 2024-02-20 RX ORDER — METOPROLOL TARTRATE 25 MG/1
25 TABLET, FILM COATED ORAL 2 TIMES DAILY
Status: DISCONTINUED | OUTPATIENT
Start: 2024-02-20 | End: 2024-02-23 | Stop reason: HOSPADM

## 2024-02-20 RX ADMIN — LIDOCAINE HYDROCHLORIDE 10 ML: 10 INJECTION, SOLUTION INFILTRATION; PERINEURAL at 08:01

## 2024-02-20 RX ADMIN — ATORVASTATIN CALCIUM 20 MG: 10 TABLET, FILM COATED ORAL at 21:06

## 2024-02-20 RX ADMIN — SUCRALFATE 1 G: 1 TABLET ORAL at 15:51

## 2024-02-20 RX ADMIN — SODIUM CHLORIDE: 9 INJECTION, SOLUTION INTRAVENOUS at 07:00

## 2024-02-20 RX ADMIN — Medication 2 G: at 07:22

## 2024-02-20 RX ADMIN — IODIXANOL 100 ML: 320 INJECTION, SOLUTION INTRAVASCULAR at 08:50

## 2024-02-20 RX ADMIN — BUDESONIDE AND FORMOTEROL FUMARATE DIHYDRATE 2 PUFF: 160; 4.5 AEROSOL RESPIRATORY (INHALATION) at 19:11

## 2024-02-20 RX ADMIN — PHENYLEPHRINE HYDROCHLORIDE 0.4 MCG/KG/MIN: 10 INJECTION INTRAVENOUS at 07:25

## 2024-02-20 RX ADMIN — CEFTRIAXONE SODIUM 1 G: 1 INJECTION, POWDER, FOR SOLUTION INTRAMUSCULAR; INTRAVENOUS at 17:47

## 2024-02-20 RX ADMIN — INSULIN ASPART 5 UNITS: 100 INJECTION, SOLUTION INTRAVENOUS; SUBCUTANEOUS at 16:18

## 2024-02-20 RX ADMIN — PANTOPRAZOLE SODIUM 40 MG: 40 TABLET, DELAYED RELEASE ORAL at 21:06

## 2024-02-20 RX ADMIN — METOPROLOL TARTRATE 25 MG: 25 TABLET, FILM COATED ORAL at 19:10

## 2024-02-20 RX ADMIN — ASPIRIN 325 MG ORAL TABLET 325 MG: 325 PILL ORAL at 06:30

## 2024-02-20 RX ADMIN — LIDOCAINE HYDROCHLORIDE 2 ML: 10 INJECTION, SOLUTION EPIDURAL; INFILTRATION; INTRACAUDAL at 07:16

## 2024-02-20 RX ADMIN — SUCRALFATE 1 G: 1 TABLET ORAL at 21:05

## 2024-02-20 RX ADMIN — HEPARIN SODIUM 6000 UNITS: 1000 INJECTION INTRAVENOUS; SUBCUTANEOUS at 08:11

## 2024-02-20 RX ADMIN — GABAPENTIN 600 MG: 300 CAPSULE ORAL at 21:06

## 2024-02-20 RX ADMIN — SODIUM CHLORIDE: 9 INJECTION, SOLUTION INTRAVENOUS at 07:02

## 2024-02-20 RX ADMIN — DIPHENHYDRAMINE HYDROCHLORIDE 25 MG: 50 INJECTION, SOLUTION INTRAMUSCULAR; INTRAVENOUS at 08:35

## 2024-02-20 RX ADMIN — FENTANYL CITRATE 25 MCG: 50 INJECTION INTRAMUSCULAR; INTRAVENOUS at 07:45

## 2024-02-20 RX ADMIN — MIDAZOLAM 1 MG: 1 INJECTION INTRAMUSCULAR; INTRAVENOUS at 07:20

## 2024-02-20 ASSESSMENT — ACTIVITIES OF DAILY LIVING (ADL)
ADLS_ACUITY_SCORE: 38
ADLS_ACUITY_SCORE: 41
ADLS_ACUITY_SCORE: 38
ADLS_ACUITY_SCORE: 45
ADLS_ACUITY_SCORE: 38
ADLS_ACUITY_SCORE: 41
ADLS_ACUITY_SCORE: 38

## 2024-02-20 ASSESSMENT — ENCOUNTER SYMPTOMS: DYSRHYTHMIAS: 1

## 2024-02-20 ASSESSMENT — COPD QUESTIONNAIRES
COPD: 1
CAT_SEVERITY: SEVERE

## 2024-02-20 ASSESSMENT — LIFESTYLE VARIABLES: TOBACCO_USE: 1

## 2024-02-20 NOTE — PROGRESS NOTES
Progress Note  Elective admission by cardiology for elective procedure, patient's status post TAVR procedure for severe symptomatic aortic stenosis    Hospital medicine consulted for medical management of comorbidities    Cards Preop notes reviewed, patient seen and examined at bedside    Home medications also reviewed, appropriate meds resumed and will order routine blood work for a.m.    Assessment/Plan  Severe symptomatic aortic stenosis, status post TAVR procedure, procedure was uncomplicated, cardiology primarily managing    Chronic diastolic heart failure, NYHA class III, compensated currently, continue furosemide, blood pressures controlled    Hypertension controlled, continue home dose of beta-blocker and diuretic, parameters placed    Paroxysmal atrial fibrillation, post PVI in 2012, Eliquis temporarily on hold, resume per cardiology, continue digoxin and metoprolol    Type 2 diabetes on Jardiance, held temporarily by cardiology, will add sliding scale insulin for now, change diet to carbohydrate diet, Premeal glucose checks per nursing    CAD, moderate nonobstructive on previous workup, no cardiac symptoms currently, continue home statin    COPD, current smoker, declines to stop despite counseling at bedside, continue Symbicort and as needed albuterol    Fibromyalgia on daily gabapentin, continue same dose    Left leg bloody blister noted, mild surrounding erythema, inform cardiology if they want to give antibiotics due to recent TAVR procedure    DVT PPX mechanical    Barriers to discharge postop recovery    Anticipated Discharge date multiple days    Addendum  Spoke to cards on call, due to concern for L leg cellulitis in post tavr pt, will begin iv abx - rocephin , check procal    Subjective  Patient seen postprocedure  Family is at bedside, has no complaints of chest pain shortness of breath, feels comfortable at bed    Objective  Vital signs in last 24 hours  Temp:  [97.7  F (36.5  C)-98.1  F (36.7   C)] 98.1  F (36.7  C)  Pulse:  [65-91] 87  Resp:  [14-25] 20  BP: (103-131)/(55-70) 104/55  SpO2:  [89 %-95 %] 93 %    Input and Output in 24 hrs     Intake/Output Summary (Last 24 hours) at 2/20/2024 8785  Last data filed at 2/20/2024 0903  Gross per 24 hour   Intake 200 ml   Output --   Net 200 ml       GEN: Alert and oriented. Not in acute distress  HEENT: Atraumatic    Pupils- round and reactive to light bilaterally   Neck- supple, no JVP elevation, no lymphadenopathy or thyromegaly   Sclera- anicteric   Mucous membrane- moist and pink  CHEST: Clear to auscultation bilaterally  HEART: S1S2 regular. No murmurs, rubs or gallops  ABDOMEN: Soft. Non-tender, non-distended. No organomegaly. No guarding or rigidity. Bowel sounds- active  Extremities: No pedal oedema, some upper extremity bruising noted  CNS: No focal neurological deficit. No involuntary movements  SKIN: Small bloody blister noted anterior aspect of left leg, mild surrounding erythema without tenderness no signs for infection      Pertinent Labs       Recent Results (from the past 24 hour(s))   Prepare red blood cells (unit)    Collection Time: 02/20/24  5:59 AM   Result Value Ref Range    Blood Component Type Red Blood Cells     Product Code [id number]     Unit Status Ready for issue     Unit Number [id number]     CROSSMATCH Compatible     CODING SYSTEM [id number]     ISSUE DATE AND TIME [id number]     UNIT ABO/RH O+     UNIT TYPE ISBT 5100    Prepare red blood cells (unit)    Collection Time: 02/20/24  5:59 AM   Result Value Ref Range    Blood Component Type Red Blood Cells     Product Code [id number]     Unit Status Ready for issue     Unit Number [id number]     CROSSMATCH Compatible     CODING SYSTEM [id number]     ISSUE DATE AND TIME [id number]     UNIT ABO/RH O+     UNIT TYPE ISBT 5100    Glucose by meter    Collection Time: 02/20/24  6:10 AM   Result Value Ref Range    GLUCOSE BY METER POCT 119 (H) 70 - 99 mg/dL   Activated clotting time  celite, POCT    Collection Time: 02/20/24  8:18 AM   Result Value Ref Range    Activated Clotting Time (Celite) POCT 286 (H) 74 - 150 seconds   Echocardiogram Complete    Collection Time: 02/20/24  8:52 AM   Result Value Ref Range    LVEF  55-60%    CBC with platelets    Collection Time: 02/20/24  9:11 AM   Result Value Ref Range    WBC Count 7.9 4.0 - 11.0 10e3/uL    RBC Count 4.26 3.80 - 5.20 10e6/uL    Hemoglobin 12.9 11.7 - 15.7 g/dL    Hematocrit 40.7 35.0 - 47.0 %    MCV 96 78 - 100 fL    MCH 30.3 26.5 - 33.0 pg    MCHC 31.7 31.5 - 36.5 g/dL    RDW 14.4 10.0 - 15.0 %    Platelet Count 207 150 - 450 10e3/uL   Comprehensive metabolic panel    Collection Time: 02/20/24  9:11 AM   Result Value Ref Range    Sodium 141 135 - 145 mmol/L    Potassium 4.3 3.4 - 5.3 mmol/L    Carbon Dioxide (CO2) 30 (H) 22 - 29 mmol/L    Anion Gap 6 (L) 7 - 15 mmol/L    Urea Nitrogen 13.7 8.0 - 23.0 mg/dL    Creatinine 0.67 0.51 - 0.95 mg/dL    GFR Estimate >90 >60 mL/min/1.73m2    Calcium 8.8 8.8 - 10.2 mg/dL    Chloride 105 98 - 107 mmol/L    Glucose 91 70 - 99 mg/dL    Alkaline Phosphatase 92 40 - 150 U/L    AST 18 0 - 45 U/L    ALT 11 0 - 50 U/L    Protein Total 6.5 6.4 - 8.3 g/dL    Albumin 3.0 (L) 3.5 - 5.2 g/dL    Bilirubin Total 0.4 <=1.2 mg/dL   Magnesium    Collection Time: 02/20/24  9:11 AM   Result Value Ref Range    Magnesium 2.1 1.7 - 2.3 mg/dL   ECG with 12 lead and MUSE [LHE]    Collection Time: 02/20/24  9:15 AM   Result Value Ref Range    Systolic Blood Pressure  mmHg    Diastolic Blood Pressure  mmHg    Ventricular Rate 84 BPM    Atrial Rate 84 BPM    WA Interval 206 ms    QRS Duration 132 ms     ms    QTc 439 ms    P Axis 82 degrees    R AXIS -3 degrees    T Axis 171 degrees    Interpretation ECG       Sinus rhythm  Non-specific intra-ventricular conduction block  T wave abnormality, consider inferolateral ischemia  Abnormal ECG  When compared with ECG of 19-FEB-2024 13:19,  Non-specific intra-ventricular  conduction block has replaced Incomplete right bundle branch block  Confirmed by TOBIAS MOLINA, LES LOC:JN ([id number]) on 2/20/2024 12:55:22 PM         EKG Results reviewed       Advanced Care Planning      Neo Barrera MD M.D

## 2024-02-20 NOTE — PROGRESS NOTES
Procedure: TAVR    Anesthesia type: MAC    Valve type: Douglas CHRISTELLE 3 Ultra RESILIA    Valve size: 26 mm    Access used:   1) Valve Delivery Site: Right femoral artery  2) Pigtail site: Left femoral artery  3) Temporary pacing wire: Left femoral vein    Implanting physician: Dr. Valencia    Surgeon who assisted in case: Dr. Jarrett Rea PA-C  [facility]ural [facility] Program  [facility]

## 2024-02-20 NOTE — Clinical Note
Temp pacer lost ca\pture. Swapped for balloon tipped. Pt given solumedrol and benedryl iv due to latex allergy

## 2024-02-20 NOTE — PHARMACY-ADMISSION MEDICATION HISTORY
Pharmacist Admission Medication History    Admission medication history is complete. The information provided in this note is only as accurate as the sources available at the time of the update.    Information Source(s): Caregiver via phone    Pertinent Information: Completed med rec with help of MARY Marie via the phone. Patient last took eliquis on Sunday 2/18/24. Patient took Gabapentin and Omeprazole at home today PTA per Cynthia.     Changes made to PTA medication list:  Added: None  Deleted: None  Changed: None    Allergies reviewed with patient and updates made in EHR: yes    Medication History Completed By: CRISTA LAIRD RPH 2/20/2024 12:48 PM    PTA Med List   Medication Sig Last Dose    albuterol (PROAIR HFA/PROVENTIL HFA/VENTOLIN HFA) 108 (90 Base) MCG/ACT inhaler INHALE 2 PUFFS INTO THE LUNGS EVERY 4 HOURS AS NEEDED FOR WHEEZING Past Month at prn    apixaban ANTICOAGULANT (ELIQUIS) 5 MG tablet Take 1 tablet (5 mg) by mouth 2 times daily Past Week at 2/18/24    atorvastatin (LIPITOR) 20 MG tablet TAKE 1 TABLET(20 MG) BY MOUTH AT BEDTIME 2/19/2024 at pm    digoxin (LANOXIN) 125 MCG tablet TAKE 1 TABLET(125 MCG) BY MOUTH DAILY 2/19/2024 at am    furosemide (LASIX) 20 MG tablet Take 0.5 tablets (10 mg) by mouth daily 2/19/2024 at am    gabapentin (NEURONTIN) 600 MG tablet TAKE 1 TABLET(600 MG) BY MOUTH THREE TIMES DAILY 2/20/2024 at am    ipratropium - albuterol 0.5 mg/2.5 mg/3 mL (DUONEB) 0.5-2.5 (3) MG/3ML neb solution Take 1 vial (3 mLs) by nebulization every 6 hours as needed for shortness of breath or wheezing Past Month at prn    JARDIANCE 10 MG TABS tablet TAKE 1 TABLET(10 MG) BY MOUTH DAILY Past Week at 2/16/24 am    magnesium oxide (MAG-OX) 400 MG tablet Take 1 tablet (400 mg) by mouth daily 2/20/2024 at am    meclizine (ANTIVERT) 25 MG tablet TAKE 1 TABLET(25 MG) BY MOUTH THREE TIMES DAILY AS NEEDED FOR DIZZINESS OR NAUSEA More than a month at prn    metoprolol tartrate (LOPRESSOR) 25 MG tablet  TAKE 1 TABLET(25 MG) BY MOUTH TWICE DAILY 2/19/2024 at pm    naloxone (NARCAN) 4 MG/0.1ML nasal spray Spray 1 spray (4 mg) into one nostril alternating nostrils once as needed for opioid reversal every 2-3 minutes until assistance arrives Unknown at prn    nystatin (NYSTOP) 469936 UNIT/GM external powder APPLY TOPICALLY TO THE AFFECTED AREA 2-3 TIMES DAILY AS NEEDED More than a month at prn    omeprazole (PRILOSEC) 20 MG DR capsule TAKE 1 CAPSULE(20 MG) BY MOUTH TWICE DAILY 2/20/2024 at am    oxyCODONE IR (ROXICODONE) 10 MG tablet Take 1 tablet (10 mg) by mouth every 6 hours as needed for moderate to severe pain Past Week at prn    potassium chloride ER (KLOR-CON M) 20 MEQ CR tablet TAKE 1 TABLET(20 MEQ) BY MOUTH DAILY 2/19/2024 at am    sucralfate (CARAFATE) 1 GM tablet CRUSH ONE TABLET AND MIX WITH A LLITTLE WATER AND SWALLOW FOUR TIMES DAILY 2/19/2024 at pm    SYMBICORT 160-4.5 MCG/ACT Inhaler INHALE 2 PUFFS INTO THE LUNGS TWICE DAILY 2/19/2024 at pm

## 2024-02-20 NOTE — ANESTHESIA POSTPROCEDURE EVALUATION
Patient: Mary Kay Tejada    Procedure: Procedure(s):  Transcatheter Aortic Valve Replacement, possible cardiopulmonary bypass, possible surgical intervention  OR TRANSCATHETER AORTIC VALVE REPLACEMENT, FEMORAL PERCUTANEOUS APPROACH (STANDBY)       Anesthesia Type:  MAC    Note:  Disposition: Outpatient   Postop Pain Control: Uneventful            Sign Out: Well controlled pain   PONV: No   Neuro/Psych: Uneventful            Sign Out: Acceptable/Baseline neuro status   Airway/Respiratory: Uneventful            Sign Out: Acceptable/Baseline resp. status   CV/Hemodynamics: Uneventful            Sign Out: Acceptable CV status; No obvious hypovolemia; No obvious fluid overload   Other NRE: NONE   DID A NON-ROUTINE EVENT OCCUR?            Last vitals:  Vitals Value Taken Time   /59 02/20/24 1000   Temp     Pulse 82 02/20/24 1011   Resp     SpO2 94 % 02/20/24 1011   Vitals shown include unfiled device data.    Electronically Signed By: Johnny Chiang MD  February 20, 2024  10:12 AM

## 2024-02-20 NOTE — ANESTHESIA CARE TRANSFER NOTE
Patient: Mary Kay Tejada    Procedure: Procedure(s):  Transcatheter Aortic Valve Replacement, possible cardiopulmonary bypass, possible surgical intervention  OR TRANSCATHETER AORTIC VALVE REPLACEMENT, FEMORAL PERCUTANEOUS APPROACH (STANDBY)       Diagnosis: valvular disease  Diagnosis Additional Information: No value filed.    Anesthesia Type:   MAC     Note:    Oropharynx: oropharynx clear of all foreign objects and spontaneously breathing  Level of Consciousness: awake  Oxygen Supplementation: nasal cannula  Level of Supplemental Oxygen (L/min / FiO2): 2  Independent Airway: airway patency satisfactory and stable  Dentition: dentition unchanged  Vital Signs Stable: post-procedure vital signs reviewed and stable  Report to RN Given: handoff report given  Patient transferred to: [facility]          Vitals:  Vitals Value Taken Time   /68    Temp 98    Pulse 80    Resp 22    SpO2 92        Electronically Signed By: RAUDEL Villeda CRNA  February 20, 2024  9:04 AM

## 2024-02-20 NOTE — OP NOTE
DATE OF SERVICE: 2/20/2024.     PREOPERATIVE DIAGNOSES:  Severe aortic stenosis.     POSTOPERATIVE DIAGNOSES:  Severe aortic stenosis.     PROCEDURE PERFORMED:  Transfemoral transcatheter aortic valve replacement (26 mm S3 Douglas valve).     SURGEON:  Ishmael Farias MD, PhD.     ATTENDING CARDIOLOGISTS:  Moises Valencia MD.     ANESTHESIA:  Managed anesthesia care.     ANESTHESIOLOGIST:  Hill Chiang MD.     ESTIMATED BLOOD LOSS:  Minimal.     SPECIMEN:  None.     INDICATIONS FOR PROCEDURE: Ms. Mary Kay Tejada is a 74 year-old woman with severe aortic stenosis.  Transcatheter replacement is recommended. The patient understands the risks and benefits of the procedure and wishes to undergo the operation.     OPERATIVE FINDINGS:  There was no significant perivalvular leak after the procedure. The S3 valve mean gradient was normal.     OPERATIVE DESCRIPTION IN DETAIL:  After obtaining informed consent, the patient was brought to the operating room and placed in the supine position on the operating room table.  Appropriate lines and devices for monitoring were placed by the anesthesia team.  The was sedated and prepped and draped before a timeout was performed to confirm the correct patient identity, as well as the procedure to be performed.      Please see the cardiology procedure note for details. The 26 mm Mai S3 valve was deployed successfully through a right transfemoral approach. The deployment was successful and there were no complications. The delivery system was removed and the femoral artery was closed with the Manta device.    Ishmael Farias MD PhD

## 2024-02-20 NOTE — PRE-PROCEDURE
GENERAL PRE-PROCEDURE:   Procedure:  TAVR  Date/Time:  2/20/2024 6:57 AM    Written consent obtained?: Yes    Risks and benefits: Risks, benefits and alternatives were discussed    Consent given by:  Patient  Patient states understanding of procedure being performed: Yes    Patient's understanding of procedure matches consent: Yes    Procedure consent matches procedure scheduled: Yes    Expected level of sedation:  Moderate  Appropriately NPO:  Yes  ASA Class:  4 (Severe aortic stenosis, AF/AFL; on NOAC, CAD, DM type II, COPD, tobacco use disorder)  Mallampati  :  Grade 1- soft palate, uvula, tonsillar pillars, and posterior pharyngeal wall visible  Lungs:  Lungs clear with good breath sounds bilaterally  Heart:  Systolic murmur  History & Physical reviewed:  History and physical reviewed and updates made (see comment)  H&P Comments:  History & Physical reviewed:  History and physical reviewed and updates made (see comment)  H&P Comments:  Clinically Significant Risk Factors Present on Admission     Cardiovascular : Severe aortic stenosis, AF/AFL; on NOAC, CAD, DM type II     Fluid & Electrolyte Disorders : Not present on admission     Gastroenterology : Not present on admission     Hematology/Oncology : Not present on admission     Nephrology : Not present on admission     Neurology : Not present on admission     Pulmonology : COPD, tobacco use disorder     Systemic : Not present on admission     Statement of review:  I have reviewed the lab findings, diagnostic data, medications, and the plan for sedation        Statement of review:  I have reviewed the lab findings, diagnostic data, medications, and the plan for sedation

## 2024-02-20 NOTE — ANESTHESIA PREPROCEDURE EVALUATION
Anesthesia Pre-Procedure Evaluation    Patient: Mary Kay Tejada   MRN: [id number] : 1950        Procedure : Procedure(s):  Transcatheter Aortic Valve Replacement, possible cardiopulmonary bypass, possible surgical intervention  OR TRANSCATHETER AORTIC VALVE REPLACEMENT, FEMORAL PERCUTANEOUS APPROACH (STANDBY)          Past Medical History:   Diagnosis Date    Anemia     Aortic stenosis     Atrial fibrillation (H)     Atrial flutter (H)     Benign neoplasm of adenomatous polyp     large intestine     Chronic constipation     Chronic pain syndrome     COPD (chronic obstructive pulmonary disease) (H)     Oxygen at night     Dependence on supplemental oxygen     Oxygen at noc, during the day as needed    Depression     Diabetes mellitus (H)     Dry eye syndrome     Fibromyalgia     Ganglion     left wrist    GERD (gastroesophageal reflux disease)     Hyperlipidemia     Hypertension     Hypokalemia     Infective otitis externa, unspecified     Created by Conversion     Larynx edema     Lung disease     Malignant neoplasm of vulva (H)     Created by Conversion [facility] Annotation: 2007  8:24AM - Cammy Bui:  resection per Dr. Alfonso Mane ;  Replacement Utility updated for latest IMO load    Medial epicondylitis     Onychomycosis     Osteoarthritis     Peptic ulcer     Polyneuropathy     Vulvar malignant neoplasm (H)       Past Surgical History:   Procedure Laterality Date    BIOPSY BREAST Right     BIOPSY BREAST Right 2015    BIOPSY BREAST Right 2015    Procedure: RIGHT BREAST BIOPSY AFTER WIRE LOCALIZATION AT 0940;  Surgeon: Renée Soriano MD;  Location: [facility];  Service:     BIOPSY OF BREAST, INCISIONAL      Description: Incisional Breast Biopsy;  Recorded: 2007;  Comments: benign    COLONOSCOPY N/A 2019    Procedure: COLONOSCOPY;  Surgeon: Eduardo Mora MD;  Location: [facility];  Service: Gastroenterology    CV CORONARY ANGIOGRAM  "N/A 2/8/2024    Procedure: CV CORONARY ANGIOGRAM;  Surgeon: Moises Valencia MD;  Location: [facility] CV    CV LEFT HEART CATH N/A 2/8/2024    Procedure: Left Heart Catheterization;  Surgeon: Moises Valencia MD;  Location: [facility] LAB CV    CV RIGHT HEART CATH MEASUREMENTS RECORDED N/A 2/8/2024    Procedure: Right Heart Catheterization;  Surgeon: Moises Valencia MD;  Location: [facility] CV    ESOPHAGOSCOPY, GASTROSCOPY, DUODENOSCOPY (EGD), COMBINED N/A 11/6/2018    Procedure: ESOPHAGOGASTRODUODENOSCOPY;  Surgeon: Lit Fernando MD;  Location: [facility];  Service:     HYSTERECTOMY      JOINT REPLACEMENT Left     TKA    PICC TRIPLE LUMEN PLACEMENT  1/12/2023         TX ABLATE HEART DYSRHYTHM FOCUS      Description: Catheter Ablation Atrial Fibrillation;  Recorded: 07/31/2012;  Comments: 7/24/12 PVI with Dr. Gardiner and nilay to all 5 pulm veins and CTI fl ablation line as well.    [facility] SUPRACERV ABD HYSTERECTOMY      Description: Supracervical Hysterectomy;  Proc Date: 01/01/1985;  Comments: some cervix left!; ovaries intact; done for bleeding      Allergies   Allergen Reactions    Celebrex [Celecoxib] Rash     patient had butterfly rash - \"lupus-like\"      Latex Rash      Social History     Tobacco Use    Smoking status: Some Days     Packs/day: .25     Types: Cigarettes     Passive exposure: Never    Smokeless tobacco: Never    Tobacco comments:     seen by TTS inpatient on 3/31/22   Substance Use Topics    Alcohol use: Yes     Comment: Alcoholic Drinks/day: very little      Wt Readings from Last 1 Encounters:   02/20/24 59.4 kg (131 lb)        Anesthesia Evaluation   Pt has had prior anesthetic.     No history of anesthetic complications       ROS/MED HX  ENT/Pulmonary:     (+)                tobacco use, Current use,        severe,  COPD, O2 dependent, during Both,  recent URI,          Neurologic:  - neg neurologic ROS     Cardiovascular:     (+) Dyslipidemia hypertension- " "-  CAD -  - -      CHF     CONTE.  fainting (syncope).           dysrhythmias, a-fib,  valvular problems/murmurs type: AS    pulmonary hypertension,      METS/Exercise Tolerance: >4 METS    Hematologic:  - neg hematologic  ROS     Musculoskeletal:   (+)  arthritis,             GI/Hepatic:     (+) GERD, Asymptomatic on medication,                  Renal/Genitourinary:  - neg Renal ROS     Endo:     (+)  type II DM,       Diabetic complications: neuropathy.             Psychiatric/Substance Use:     (+) psychiatric history depression       Infectious Disease:  - neg infectious disease ROS     Malignancy:  - neg malignancy ROS     Other:  - neg other ROS    (+)  , H/O Chronic Pain,         Physical Exam    Airway  airway exam normal      Mallampati: II   TM distance: > 3 FB   Neck ROM: full   Mouth opening: > 3 cm    Respiratory Devices and Support         Dental  no notable dental history     (+) Modest Abnormalities - crowns, retainers, 1 or 2 missing teeth      Cardiovascular           (+) murmur       Pulmonary           (+) decreased breath sounds           OUTSIDE LABS:  CBC:   Lab Results   Component Value Date    WBC 7.7 02/19/2024    WBC 6.3 02/10/2024    HGB 13.4 02/19/2024    HGB 12.5 02/10/2024    HCT 43.1 02/19/2024    HCT 39.7 02/10/2024     02/19/2024     02/10/2024     BMP:   Lab Results   Component Value Date     02/19/2024     02/10/2024    POTASSIUM 5.1 02/19/2024    POTASSIUM 4.0 02/10/2024    CHLORIDE 104 02/19/2024    CHLORIDE 104 02/10/2024    CO2 31 (H) 02/19/2024    CO2 31 (H) 02/10/2024    BUN 14.1 02/19/2024    BUN 24.2 (H) 02/10/2024    CR 0.65 02/19/2024    CR 0.74 02/10/2024     (H) 02/20/2024     (H) 02/19/2024     COAGS:   Lab Results   Component Value Date    PTT 39 (H) 12/13/2018    INR 1.04 02/19/2024     POC: No results found for: \"BGM\", \"HCG\", \"HCGS\"  HEPATIC:   Lab Results   Component Value Date    ALBUMIN 3.5 02/19/2024    PROTTOTAL 7.1 " 02/19/2024    ALT 15 02/19/2024    AST 18 02/19/2024    ALKPHOS 100 02/19/2024    BILITOTAL 0.4 02/19/2024     OTHER:   Lab Results   Component Value Date    PH 7.36 02/08/2024    LACT 1.7 02/06/2024    A1C 6.2 (H) 02/06/2024    JOEY 9.2 02/19/2024    PHOS 3.0 01/17/2023    MAG 2.2 02/19/2024    LIPASE 12 (L) 02/06/2024    TSH 1.17 05/24/2023    CRP 0.5 01/12/2023       Anesthesia Plan    ASA Status:  4    NPO Status:  NPO Appropriate    Anesthesia Type: MAC.     - Reason for MAC: straight local not clinically adequate              Consents    Anesthesia Plan(s) and associated risks, benefits, and realistic alternatives discussed. Questions answered and patient/representative(s) expressed understanding.     - Discussed:     - Discussed with:  Patient, Other (See Comment)      - Extended Intubation/Ventilatory Support Discussed: No.      - Patient is DNR/DNI Status: No     Use of blood products discussed: No .     Postoperative Care    Pain management: IV analgesics, Multi-modal analgesia, Oral pain medications.   PONV prophylaxis: Ondansetron (or other 5HT-3)     Comments:               Johnny Chiang MD    I have reviewed the pertinent notes and labs in the chart from the past 30 days and (re)examined the patient.  Any updates or changes from those notes are reflected in this note.            # Drug Induced Coagulation Defect: home medication list includes an anticoagulant medication

## 2024-02-21 ENCOUNTER — APPOINTMENT (OUTPATIENT)
Dept: RADIOLOGY | Facility: HOSPITAL | Age: 74
DRG: 266 | End: 2024-02-21
Attending: PHYSICIAN ASSISTANT
Payer: COMMERCIAL

## 2024-02-21 ENCOUNTER — APPOINTMENT (OUTPATIENT)
Dept: CARDIOLOGY | Facility: HOSPITAL | Age: 74
DRG: 266 | End: 2024-02-21
Attending: PHYSICIAN ASSISTANT
Payer: COMMERCIAL

## 2024-02-21 ENCOUNTER — PATIENT OUTREACH (OUTPATIENT)
Dept: GERIATRIC MEDICINE | Facility: CLINIC | Age: 74
End: 2024-02-21

## 2024-02-21 ENCOUNTER — APPOINTMENT (OUTPATIENT)
Dept: OCCUPATIONAL THERAPY | Facility: HOSPITAL | Age: 74
DRG: 266 | End: 2024-02-21
Attending: PHYSICIAN ASSISTANT
Payer: COMMERCIAL

## 2024-02-21 DIAGNOSIS — Z95.2 S/P TAVR (TRANSCATHETER AORTIC VALVE REPLACEMENT): Primary | ICD-10-CM

## 2024-02-21 LAB
ANION GAP SERPL CALCULATED.3IONS-SCNC: 5 MMOL/L (ref 7–15)
ATRIAL RATE - MUSE: 73 BPM
BUN SERPL-MCNC: 18.5 MG/DL (ref 8–23)
CALCIUM SERPL-MCNC: 8.8 MG/DL (ref 8.8–10.2)
CHLORIDE SERPL-SCNC: 106 MMOL/L (ref 98–107)
CREAT SERPL-MCNC: 0.67 MG/DL (ref 0.51–0.95)
CRP SERPL-MCNC: 4.2 MG/L
DEPRECATED HCO3 PLAS-SCNC: 31 MMOL/L (ref 22–29)
DIASTOLIC BLOOD PRESSURE - MUSE: NORMAL MMHG
EGFRCR SERPLBLD CKD-EPI 2021: >90 ML/MIN/1.73M2
ERYTHROCYTE [DISTWIDTH] IN BLOOD BY AUTOMATED COUNT: 14.2 % (ref 10–15)
GLUCOSE BLDC GLUCOMTR-MCNC: 124 MG/DL (ref 70–99)
GLUCOSE BLDC GLUCOMTR-MCNC: 141 MG/DL (ref 70–99)
GLUCOSE BLDC GLUCOMTR-MCNC: 151 MG/DL (ref 70–99)
GLUCOSE BLDC GLUCOMTR-MCNC: 95 MG/DL (ref 70–99)
GLUCOSE SERPL-MCNC: 103 MG/DL (ref 70–99)
HCT VFR BLD AUTO: 36.5 % (ref 35–47)
HGB BLD-MCNC: 11.5 G/DL (ref 11.7–15.7)
INTERPRETATION ECG - MUSE: NORMAL
MAGNESIUM SERPL-MCNC: 2.2 MG/DL (ref 1.7–2.3)
MCH RBC QN AUTO: 29.9 PG (ref 26.5–33)
MCHC RBC AUTO-ENTMCNC: 31.5 G/DL (ref 31.5–36.5)
MCV RBC AUTO: 95 FL (ref 78–100)
P AXIS - MUSE: 61 DEGREES
PLATELET # BLD AUTO: 170 10E3/UL (ref 150–450)
POTASSIUM SERPL-SCNC: 4 MMOL/L (ref 3.4–5.3)
PR INTERVAL - MUSE: 184 MS
QRS DURATION - MUSE: 112 MS
QT - MUSE: 362 MS
QTC - MUSE: 398 MS
R AXIS - MUSE: -78 DEGREES
RBC # BLD AUTO: 3.84 10E6/UL (ref 3.8–5.2)
SODIUM SERPL-SCNC: 142 MMOL/L (ref 135–145)
SYSTOLIC BLOOD PRESSURE - MUSE: NORMAL MMHG
T AXIS - MUSE: 72 DEGREES
VENTRICULAR RATE- MUSE: 73 BPM
WBC # BLD AUTO: 9.8 10E3/UL (ref 4–11)

## 2024-02-21 PROCEDURE — 94640 AIRWAY INHALATION TREATMENT: CPT | Mod: 76

## 2024-02-21 PROCEDURE — 71045 X-RAY EXAM CHEST 1 VIEW: CPT

## 2024-02-21 PROCEDURE — 94640 AIRWAY INHALATION TREATMENT: CPT

## 2024-02-21 PROCEDURE — 93010 ELECTROCARDIOGRAM REPORT: CPT | Performed by: INTERNAL MEDICINE

## 2024-02-21 PROCEDURE — 210N000001 HC R&B IMCU HEART CARE

## 2024-02-21 PROCEDURE — 93005 ELECTROCARDIOGRAM TRACING: CPT | Performed by: PHYSICIAN ASSISTANT

## 2024-02-21 PROCEDURE — 999N000208 ECHOCARDIOGRAM COMPLETE

## 2024-02-21 PROCEDURE — 272N000202 HC AEROBIKA WITH MANOMETER

## 2024-02-21 PROCEDURE — 999N000157 HC STATISTIC RCP TIME EA 10 MIN

## 2024-02-21 PROCEDURE — 250N000009 HC RX 250: Performed by: PHYSICIAN ASSISTANT

## 2024-02-21 PROCEDURE — 93005 ELECTROCARDIOGRAM TRACING: CPT

## 2024-02-21 PROCEDURE — G0463 HOSPITAL OUTPT CLINIC VISIT: HCPCS | Mod: 25

## 2024-02-21 PROCEDURE — 97110 THERAPEUTIC EXERCISES: CPT | Mod: GO

## 2024-02-21 PROCEDURE — 80048 BASIC METABOLIC PNL TOTAL CA: CPT | Performed by: PHYSICIAN ASSISTANT

## 2024-02-21 PROCEDURE — 85027 COMPLETE CBC AUTOMATED: CPT | Performed by: PHYSICIAN ASSISTANT

## 2024-02-21 PROCEDURE — 99254 IP/OBS CNSLTJ NEW/EST MOD 60: CPT | Performed by: INTERNAL MEDICINE

## 2024-02-21 PROCEDURE — 250N000013 HC RX MED GY IP 250 OP 250 PS 637: Performed by: PHYSICIAN ASSISTANT

## 2024-02-21 PROCEDURE — 999N000156 HC STATISTIC RCP CONSULT EA 30 MIN

## 2024-02-21 PROCEDURE — 93306 TTE W/DOPPLER COMPLETE: CPT | Mod: 26 | Performed by: INTERNAL MEDICINE

## 2024-02-21 PROCEDURE — 94799 UNLISTED PULMONARY SVC/PX: CPT

## 2024-02-21 PROCEDURE — C8929 TTE W OR WO FOL WCON,DOPPLER: HCPCS

## 2024-02-21 PROCEDURE — 83735 ASSAY OF MAGNESIUM: CPT | Performed by: PHYSICIAN ASSISTANT

## 2024-02-21 PROCEDURE — 94660 CPAP INITIATION&MGMT: CPT

## 2024-02-21 PROCEDURE — 250N000009 HC RX 250: Performed by: INTERNAL MEDICINE

## 2024-02-21 PROCEDURE — 97535 SELF CARE MNGMENT TRAINING: CPT | Mod: GO

## 2024-02-21 PROCEDURE — 250N000013 HC RX MED GY IP 250 OP 250 PS 637: Performed by: INTERNAL MEDICINE

## 2024-02-21 PROCEDURE — 97165 OT EVAL LOW COMPLEX 30 MIN: CPT | Mod: GO

## 2024-02-21 PROCEDURE — 999N000065 XR CHEST PORT 1 VIEW

## 2024-02-21 PROCEDURE — 272N000735 HC KIT, CIRCUIT WITH NEB, VOLERA

## 2024-02-21 PROCEDURE — 86140 C-REACTIVE PROTEIN: CPT | Performed by: INTERNAL MEDICINE

## 2024-02-21 PROCEDURE — 99233 SBSQ HOSP IP/OBS HIGH 50: CPT | Performed by: INTERNAL MEDICINE

## 2024-02-21 PROCEDURE — 255N000002 HC RX 255 OP 636: Performed by: INTERNAL MEDICINE

## 2024-02-21 PROCEDURE — 36415 COLL VENOUS BLD VENIPUNCTURE: CPT | Performed by: PHYSICIAN ASSISTANT

## 2024-02-21 RX ORDER — IPRATROPIUM BROMIDE AND ALBUTEROL SULFATE 2.5; .5 MG/3ML; MG/3ML
3 SOLUTION RESPIRATORY (INHALATION)
Status: DISCONTINUED | OUTPATIENT
Start: 2024-02-21 | End: 2024-02-23 | Stop reason: HOSPADM

## 2024-02-21 RX ORDER — IPRATROPIUM BROMIDE AND ALBUTEROL SULFATE 2.5; .5 MG/3ML; MG/3ML
3 SOLUTION RESPIRATORY (INHALATION)
Status: DISCONTINUED | OUTPATIENT
Start: 2024-02-21 | End: 2024-02-21

## 2024-02-21 RX ORDER — CEFDINIR 300 MG/1
300 CAPSULE ORAL EVERY 12 HOURS SCHEDULED
Status: DISCONTINUED | OUTPATIENT
Start: 2024-02-21 | End: 2024-02-23 | Stop reason: HOSPADM

## 2024-02-21 RX ORDER — ACETYLCYSTEINE 100 MG/ML
4 SOLUTION ORAL; RESPIRATORY (INHALATION) EVERY 4 HOURS
Status: DISCONTINUED | OUTPATIENT
Start: 2024-02-21 | End: 2024-02-22

## 2024-02-21 RX ADMIN — SUCRALFATE 1 G: 1 TABLET ORAL at 06:39

## 2024-02-21 RX ADMIN — INSULIN ASPART 1 UNITS: 100 INJECTION, SOLUTION INTRAVENOUS; SUBCUTANEOUS at 08:28

## 2024-02-21 RX ADMIN — SUCRALFATE 1 G: 1 TABLET ORAL at 17:39

## 2024-02-21 RX ADMIN — CEFDINIR 300 MG: 300 CAPSULE ORAL at 12:01

## 2024-02-21 RX ADMIN — ACETYLCYSTEINE 4 ML: 100 SOLUTION ORAL; RESPIRATORY (INHALATION) at 20:08

## 2024-02-21 RX ADMIN — BUDESONIDE AND FORMOTEROL FUMARATE DIHYDRATE 2 PUFF: 160; 4.5 AEROSOL RESPIRATORY (INHALATION) at 21:43

## 2024-02-21 RX ADMIN — Medication 81 MG: at 08:27

## 2024-02-21 RX ADMIN — ATORVASTATIN CALCIUM 20 MG: 10 TABLET, FILM COATED ORAL at 21:43

## 2024-02-21 RX ADMIN — IPRATROPIUM BROMIDE AND ALBUTEROL SULFATE 3 ML: .5; 3 SOLUTION RESPIRATORY (INHALATION) at 15:42

## 2024-02-21 RX ADMIN — GABAPENTIN 600 MG: 300 CAPSULE ORAL at 21:43

## 2024-02-21 RX ADMIN — FUROSEMIDE 10 MG: 20 TABLET ORAL at 08:27

## 2024-02-21 RX ADMIN — PANTOPRAZOLE SODIUM 40 MG: 40 TABLET, DELAYED RELEASE ORAL at 21:43

## 2024-02-21 RX ADMIN — EMPAGLIFLOZIN 10 MG: 10 TABLET, FILM COATED ORAL at 08:44

## 2024-02-21 RX ADMIN — GABAPENTIN 600 MG: 300 CAPSULE ORAL at 08:26

## 2024-02-21 RX ADMIN — METOPROLOL TARTRATE 25 MG: 25 TABLET, FILM COATED ORAL at 08:25

## 2024-02-21 RX ADMIN — METOPROLOL TARTRATE 25 MG: 25 TABLET, FILM COATED ORAL at 21:43

## 2024-02-21 RX ADMIN — SUCRALFATE 1 G: 1 TABLET ORAL at 21:43

## 2024-02-21 RX ADMIN — IPRATROPIUM BROMIDE AND ALBUTEROL SULFATE 3 ML: .5; 3 SOLUTION RESPIRATORY (INHALATION) at 11:43

## 2024-02-21 RX ADMIN — PANTOPRAZOLE SODIUM 40 MG: 40 TABLET, DELAYED RELEASE ORAL at 08:26

## 2024-02-21 RX ADMIN — PERFLUTREN 2 ML: 6.52 INJECTION, SUSPENSION INTRAVENOUS at 08:04

## 2024-02-21 RX ADMIN — ACETYLCYSTEINE 4 ML: 100 SOLUTION ORAL; RESPIRATORY (INHALATION) at 15:46

## 2024-02-21 RX ADMIN — IPRATROPIUM BROMIDE AND ALBUTEROL SULFATE 3 ML: .5; 3 SOLUTION RESPIRATORY (INHALATION) at 20:07

## 2024-02-21 RX ADMIN — POTASSIUM CHLORIDE 20 MEQ: 1500 TABLET, EXTENDED RELEASE ORAL at 08:27

## 2024-02-21 RX ADMIN — GABAPENTIN 600 MG: 300 CAPSULE ORAL at 13:22

## 2024-02-21 RX ADMIN — SUCRALFATE 1 G: 1 TABLET ORAL at 12:01

## 2024-02-21 RX ADMIN — CEFDINIR 300 MG: 300 CAPSULE ORAL at 21:43

## 2024-02-21 RX ADMIN — DIGOXIN 125 MCG: 125 TABLET ORAL at 08:27

## 2024-02-21 RX ADMIN — BUDESONIDE AND FORMOTEROL FUMARATE DIHYDRATE 2 PUFF: 160; 4.5 AEROSOL RESPIRATORY (INHALATION) at 06:40

## 2024-02-21 ASSESSMENT — ACTIVITIES OF DAILY LIVING (ADL)
ADLS_ACUITY_SCORE: 41
DEPENDENT_IADLS:: CLEANING;COOKING;LAUNDRY;SHOPPING;MEAL PREPARATION;TRANSPORTATION;MEDICATION MANAGEMENT
ADLS_ACUITY_SCORE: 41
IADL_COMMENTS: ASSIST FOR ALL IADLS

## 2024-02-21 NOTE — PROGRESS NOTES
HEART CARE CONSULTATON NOTE        Assessment/Recommendations   Assessment:  Severe aortic stenosis now status post TAVR with a 26 mm Douglas CHRISTELLE 3 Ultra RESILIA  Chronic diastolic heart failure, NYHA class III -currently compensated.  LVEDP postprocedure was 10.  Hypertension - controlled   Paroxysmal atrial fibrillation -status post PVI in 2012.  Currently in normal sinus rhythm  Mild to moderate nonobstructive CAD pre-TAVR coronary angiogram  Type 2 diabetes mellitus -most recent hemoglobin A1c was 6.2  Chronic obstructive pulmonary disease -currently smokes 4 to 5 cigarettes daily.  Not interested in smoking cessation at this time. Chronically on 3 L of supplemental oxygen  Recurrent right pleural effusion -1 L drained during her last hospital admission 2/6 - 2/10.  CXR this morning concerning for shift of mediastinal structures to the right, bronchus intermedius cut off consistent with secretions, atelectasis,  and small right pleural effusion  Left leg wound - Erythema appeared slightly worse this morning after starting Rocephin yesterday. ID consulted given recent antibiotic history    Plan:   Continue aspirin until able to resume Eliquis.  Plan to resume Eliquis evening of 2/22/2024 as long as not having bleeding issues.  Continue Lasix 10 mg daily, metoprolol 25 mg twice daily, digoxin 125 mcg daily  Will trial MetaNeb, Mucomyst, and Duoneb and repeat CXR; may need to consider CT scan and/or pulmonology consult  Continue PTA Lipitor  Continue cardiac monitoring; plan to send home with MCOT monitor upon discharge   Cardiac rehab  Cefdinir per ID recs    Clinically Significant Risk Factors              # Hypoalbuminemia: Lowest albumin = 3 g/dL at 2/20/2024  9:11 AM, will monitor as appropriate       # Hypertension: Noted on problem list                             History of Present Illness/Subjective    HPI: Mary Kay Tejada is a 74 year old female 74-year-old female with a past medical history  "significant for aortic stenosis, chronic diastolic heart failure, hypertension, paroxysmal atrial fibrillation, coronary artery disease, chronic obstructive pulmonary disease (on 3 L of O2), type 2 diabetes mellitus, osteoarthritis, fibromyalgia/chronic pain, GERD, recurrent pleural effusions.    She has had moderate aortic stenosis and has been surveillance with routine echoes for years.  Her dyspnea on exertion has worsened over the past year.  She was found to have progression of her aortic valve stenosis to see severe in June 2023 at admission for acute decompensated heart failure.  She was referred to valve clinic and evaluated by multidisciplinary team and deemed a good candidate for transcatheter aortic valve replacement.    She was admitted to the hospital about 1 week ago for COPD exacerbation.  She was found to have moderate to large right pleural effusion and underwent thoracentesis with 1 L of clear yellow fluid drained.  She did have a significant improvement in her respiratory status.  She was given ceftriaxone during her hospitalization and was discharged on Augmentin.    She tolerated the procedure well yesterday.  She does have a wound on her left lower extremity that she has had for the past several weeks that occurred after hitting her leg against a car door.  She denies any pain at the site.  She otherwise feels well this morning she denies chest pain, shortness of breath, dizziness, or lightheadedness.  She reports a good appetite and is voiding without issue.       Physical Examination  Review of Systems   VITALS: /52   Pulse 72   Temp 98.1  F (36.7  C) (Oral)   Resp 20   Ht 1.651 m (5' 5\")   Wt 60.1 kg (132 lb 9.6 oz)   LMP  (LMP Unknown)   SpO2 90%   BMI 22.07 kg/m    BMI: Body mass index is 22.07 kg/m .  Wt Readings from Last 3 Encounters:   02/21/24 60.1 kg (132 lb 9.6 oz)   02/19/24 59 kg (130 lb)   02/10/24 59.6 kg (131 lb 8 oz)       Intake/Output Summary (Last 24 hours) at " 2/21/2024 1245  Last data filed at 2/21/2024 0845  Gross per 24 hour   Intake 1950 ml   Output 1025 ml   Net 925 ml     General Appearance:   no distress, normal body habitus   ENT/Mouth: membranes moist, no oral lesions or bleeding gums.      EYES:  no scleral icterus, normal conjunctivae   Neck: no carotid bruits or thyromegaly   Chest/Lungs:   Diminished   Cardiovascular:   Regular. Normal first and second heart sounds with no murmurs, rubs, or gallops; the carotid, radial and posterior tibial pulses are intact, no edema bilaterally    Abdomen:  no organomegaly, masses, bruits, or tenderness; bowel sounds are present   Extremities: no cyanosis or clubbing. Bilateral groin sites are c/d/I. No ecchymosis or hematoma. Left lower extremity with hemorrhagic blister with surrounding erythema. No obvious calor, tenderness, or fluctuance. No purulent discharge    Skin: no xanthelasma, warm.    Neurologic: normal  bilateral, no tremors     Psychiatric: alert and oriented x3, calm     Review Of Systems  Skin: negative  Eyes: negative  Ears/Nose/Throat: negative  Respiratory: No shortness of breath, dyspnea on exertion, cough, or hemoptysis  Cardiovascular: negative  Gastrointestinal: negative  Genitourinary: negative  Musculoskeletal: negative  Neurologic: negative  Psychiatric: negative  Hematologic/Lymphatic/Immunologic: negative  Endocrine: negative          Lab Results    Chemistry/lipid CBC Cardiac Enzymes/BNP/TSH/INR   Recent Labs   Lab Test 09/20/23  1136   CHOL 167   HDL 75   LDL 75   TRIG 83     Recent Labs   Lab Test 09/20/23  1136 07/25/22  1026 12/24/21  0756   LDL 75 53 77     Recent Labs   Lab Test 02/21/24  1137 02/21/24  0711 02/21/24  0432   NA  --   --  142   POTASSIUM  --   --  4.0   CHLORIDE  --   --  106   CO2  --   --  31*   *   < > 103*   BUN  --   --  18.5   CR  --   --  0.67   GFRESTIMATED  --   --  >90   JOEY  --   --  8.8    < > = values in this interval not displayed.     Recent Labs    Lab Test 02/21/24  0432 02/20/24  0911 02/19/24  1340   CR 0.67 0.67 0.65     Recent Labs   Lab Test 02/06/24  1953 09/20/23  1136 05/25/23  0507   A1C 6.2* 6.1* 6.1*          Recent Labs   Lab Test 02/21/24  0432   WBC 9.8   HGB 11.5*   HCT 36.5   MCV 95        Recent Labs   Lab Test 02/21/24  0432 02/20/24  0911 02/19/24  1340   HGB 11.5* 12.9 13.4    Recent Labs   Lab Test 05/24/23  1322 05/24/23  1015 02/08/23  0759   TROPONINI 0.07 0.08 0.09     Recent Labs   Lab Test 02/19/24  1340 02/06/24  1953 06/02/23  0057 05/24/23  1015 02/08/23  0759   BNP  --   --  136* 176* 867*   NTBNPI  --  534  --   --   --    NTBNP 641  --   --   --   --      Recent Labs   Lab Test 05/24/23  1015   TSH 1.17     Recent Labs   Lab Test 02/19/24  1340 02/08/23  0759 01/18/23  0522   INR 1.04 1.34* 1.35*        Medical History  Surgical History Family History Social History   Past Medical History:   Diagnosis Date    Anemia     Aortic stenosis     Atrial fibrillation (H)     Atrial flutter (H)     Benign neoplasm of adenomatous polyp     large intestine     Chronic constipation     Chronic pain syndrome     COPD (chronic obstructive pulmonary disease) (H)     Oxygen at night     Dependence on supplemental oxygen     Oxygen at noc, during the day as needed    Depression     Diabetes mellitus (H)     Dry eye syndrome     Fibromyalgia     Ganglion     left wrist    GERD (gastroesophageal reflux disease)     Hyperlipidemia     Hypertension     Hypokalemia     Infective otitis externa, unspecified     Created by Conversion     Larynx edema     Lung disease     Malignant neoplasm of vulva (H)     Created by Conversion [facility] Annotation: Apr 17 2007  8:24AM - Cammy Bui:  resection per Dr. Alfonso Mane 9/06;  Replacement Utility updated for latest IMO load    Medial epicondylitis     Onychomycosis     Osteoarthritis     Peptic ulcer     Polyneuropathy     Vulvar malignant neoplasm (H)      Past Surgical History:    Procedure Laterality Date    BIOPSY BREAST Right     BIOPSY BREAST Right 01/28/2015    BIOPSY BREAST Right 1/28/2015    Procedure: RIGHT BREAST BIOPSY AFTER WIRE LOCALIZATION AT 0940;  Surgeon: Renée Soriano MD;  Location: [facility] [place];  Service:     BIOPSY OF BREAST, INCISIONAL      Description: Incisional Breast Biopsy;  Recorded: 11/13/2007;  Comments: benign    COLONOSCOPY N/A 6/14/2019    Procedure: COLONOSCOPY;  Surgeon: Eduardo Mora MD;  Location: [facility] [place];  Service: Gastroenterology    CV CORONARY ANGIOGRAM N/A 2/8/2024    Procedure: CV CORONARY ANGIOGRAM;  Surgeon: Moises Valencia MD;  Location: [facility] CV    CV LEFT HEART CATH N/A 2/8/2024    Procedure: Left Heart Catheterization;  Surgeon: Moises Valencia MD;  Location: [facility]    CV RIGHT HEART CATH MEASUREMENTS RECORDED N/A 2/8/2024    Procedure: Right Heart Catheterization;  Surgeon: Moises Valencia MD;  Location: [facility]    CV TRANSCATHETER AORTIC VALVE REPLACEMENT-FEMORAL APPROACH N/A 2/20/2024    Procedure: Transcatheter Aortic Valve Replacement, possible cardiopulmonary bypass, possible surgical intervention;  Surgeon: Moises Valencia MD;  Location: [facility] CV    ESOPHAGOSCOPY, GASTROSCOPY, DUODENOSCOPY (EGD), COMBINED N/A 11/6/2018    Procedure: ESOPHAGOGASTRODUODENOSCOPY;  Surgeon: Lit Fernando MD;  Location: [facility] [place];  Service:     HYSTERECTOMY      JOINT REPLACEMENT Left     TKA    OR TRANSCATHETER AORTIC VALVE REPLACEMENT, FEMORAL PERCUTANEOUS APPROACH (STANDBY) N/A 2/20/2024    Procedure: OR TRANSCATHETER AORTIC VALVE REPLACEMENT, FEMORAL PERCUTANEOUS APPROACH (STANDBY);  Surgeon: Ishmael Farias MD;  Location: [facility] CV    PICC TRIPLE LUMEN PLACEMENT  1/12/2023         [place] ABLATE HEART DYSRHYTHM FOCUS      Description: Catheter Ablation Atrial Fibrillation;  Recorded: 07/31/2012;  Comments: 7/24/12 PVI with Dr. Gardiner and  cyro to all 5 pulm veins and CTI fl ablation line as well.    ZZC SUPRACERV ABD HYSTERECTOMY      Description: Supracervical Hysterectomy;  Proc Date: 01/01/1985;  Comments: some cervix left!; ovaries intact; done for bleeding     Family History   Problem Relation Age of Onset    Heart Failure Mother     Cancer Other         paternal HX-laryngeal     Alcoholism Sister     No Known Problems Daughter     No Known Problems Maternal Grandmother     No Known Problems Maternal Grandfather     No Known Problems Paternal Grandmother     No Known Problems Paternal Grandfather     No Known Problems Maternal Aunt     No Known Problems Paternal Aunt     Alcoholism Sister     Alcoholism Brother     Alcoholism Father     Cancer Paternal Uncle         Gastric-Alcohol    Cancer Paternal Uncle         gastric-Alcohol    Hereditary Breast and Ovarian Cancer Syndrome No family hx of     Breast Cancer No family hx of     Colon Cancer No family hx of     Endometrial Cancer No family hx of     Ovarian Cancer No family hx of         Social History     Socioeconomic History    Marital status:      Spouse name: Not on file    Number of children: Not on file    Years of education: Not on file    Highest education level: Not on file   Occupational History    Not on file   Tobacco Use    Smoking status: Some Days     Packs/day: .25     Types: Cigarettes     Passive exposure: Never    Smokeless tobacco: Never    Tobacco comments:     seen by TTS inpatient on 3/31/22   Vaping Use    Vaping Use: Never used   Substance and Sexual Activity    Alcohol use: Yes     Comment: Alcoholic Drinks/day: very little    Drug use: No    Sexual activity: Not on file   Other Topics Concern    Not on file   Social History Narrative    Not on file     Social Determinants of Health     Financial Resource Strain: Low Risk  (12/26/2023)    Financial Resource Strain     Within the past 12 months, have you or your family members you live with been unable to get  utilities (heat, electricity) when it was really needed?: No   Food Insecurity: Low Risk  (12/26/2023)    Food Insecurity     Within the past 12 months, did you worry that your food would run out before you got money to buy more?: No     Within the past 12 months, did the food you bought just not last and you didn t have money to get more?: No   Transportation Needs: Low Risk  (12/26/2023)    Transportation Needs     Within the past 12 months, has lack of transportation kept you from medical appointments, getting your medicines, non-medical meetings or appointments, work, or from getting things that you need?: No   Physical Activity: Not on file   Stress: Not on file   Social Connections: Not on file   Interpersonal Safety: Low Risk  (9/20/2023)    Interpersonal Safety     Do you feel physically and emotionally safe where you currently live?: Yes     Within the past 12 months, have you been hit, slapped, kicked or otherwise physically hurt by someone?: No     Within the past 12 months, have you been humiliated or emotionally abused in other ways by your partner or ex-partner?: No   Housing Stability: Low Risk  (12/26/2023)    Housing Stability     Do you have housing? : Yes     Are you worried about losing your housing?: No         Medications  Allergies   Current Facility-Administered Medications   Medication    acetaminophen (TYLENOL) tablet 650 mg    acetylcysteine (MUCOMYST) 10 % nebulizer solution 4 mL    albuterol (PROVENTIL HFA/VENTOLIN HFA) inhaler    aspirin EC tablet 81 mg    atorvastatin (LIPITOR) tablet 20 mg    budesonide-formoterol (SYMBICORT) 160-4.5 MCG/ACT Inhaler 2 puff    cefdinir (OMNICEF) capsule 300 mg    glucose gel 15-30 g    Or    dextrose 50 % injection 25-50 mL    Or    glucagon injection 1 mg    digoxin (LANOXIN) tablet 125 mcg    empagliflozin (JARDIANCE) tablet 10 mg    furosemide (LASIX) half-tab 10 mg    gabapentin (NEURONTIN) capsule 600 mg    HOLD:  Metformin and metformin  "containing medications if patient received IV contrast with acute kidney injury or severe chronic kidney disease (stage IV or stage V; i.e., eGFR less than 30)    hydrALAZINE (APRESOLINE) injection 10 mg    insulin aspart (NovoLOG) injection (RAPID ACTING)    insulin aspart (NovoLOG) injection (RAPID ACTING)    ipratropium - albuterol 0.5 mg/2.5 mg/3 mL (DUONEB) neb solution 3 mL    ipratropium - albuterol 0.5 mg/2.5 mg/3 mL (DUONEB) neb solution 3 mL    metoprolol tartrate (LOPRESSOR) tablet 25 mg    naloxone (NARCAN) injection 0.2 mg    Or    naloxone (NARCAN) injection 0.4 mg    Or    naloxone (NARCAN) injection 0.2 mg    Or    naloxone (NARCAN) injection 0.4 mg    nicotine (NICODERM CQ) 14 MG/24HR 24 hr patch 1 patch    nicotine Patch in Place    nitroGLYcerin (NITROSTAT) sublingual tablet 0.4 mg    ondansetron (ZOFRAN ODT) ODT tab 4 mg    Or    ondansetron (ZOFRAN) injection 4 mg    oxyCODONE (ROXICODONE) tablet 5 mg    Or    oxyCODONE (ROXICODONE) tablet 10 mg    pantoprazole (PROTONIX) EC tablet 40 mg    potassium chloride ER (KLOR-CON M) CR tablet 20 mEq    sucralfate (CARAFATE) tablet 1 g        Allergies   Allergen Reactions    Celebrex [Celecoxib] Rash     patient had butterfly rash - \"lupus-like\"      Latex Rash         50 MINUTES SPENT BY ME on the date of service doing chart review, history, exam, documentation & further activities per the note.      Margy Rea PA-C  Structural Heart Program  [facility]     "

## 2024-02-21 NOTE — PROGRESS NOTES
[facility]    Medicine Progress Note - Hospitalist Service    Date of Admission:  2/20/2024    Assessment & Plan   74-year-old female with PMH of paroxysmal A-fib s/p PVI in 2012 on Eliquis, chronic HFpEF, hypertension, DM2, mild to moderate nonobstructive CAD, COPD, chronic respiratory failure on 3 L home O2, ongoing tobacco use, fibromyalgia and chronic pain who underwent TAVR for severe aortic stenosis.  Postprocedure she was started on IV ceftriaxone for possible left lower extremity cellulitis    Hemorrhagic blister and possible left lower extremity cellulitis.  No fever, leukocytosis or CRP elevation.  Patient reports improvement in erythema since it first appeared couple of weeks ago.  Defer to ID, but likely short course of oral antibiotics should be sufficient.  Order placed for WOC RN for local wound cares  COPD, chronic respiratory failure with hypoxia.  Patient is at baseline oxygen needs.   Chest x-ray today shows no shift of the heart and mediastinal structures to the right, secretions or debris within central infrahilar right lung airways and postobstructive atelectasis.  Agree with scheduled nebulizers.  Needs aggressive pulmonary hygiene (I-S and flutter valve ordered)  DM2 without long-term insulin use.  Well-controlled as outpatient, hemoglobin A1c 6.2.  Continue Jardiance.  Monitor blood sugars with meals and at bedtime  Essential hypertension.  Well-controlled on current medication regimen  Chronic HFpEF.  Appears euvolemic.  Continue PTA diuretic  Paroxysmal A-fib s/p PVI in 2012.  Currently in sinus rhythm.  Continue metoprolol and digoxin for ventricular rate control.  Resume Eliquis per cardiology  Mild to moderate nonobstructive CAD.  No recent angina  Severe aortic stenosis s/p TAVR.  Continue management per cardiology  Tobacco use.  Patient has no plans to quit         Diet: Low Saturated Fat Na <2400 mg    DVT Prophylaxis: Ambulate every shift  Chairez Catheter:  Not present  Lines: None     Cardiac Monitoring: ACTIVE order. Indication: Transcatheter structural interventions (24 hours)  Code Status: Full Code      Clinically Significant Risk Factors              # Hypoalbuminemia: Lowest albumin = 3 g/dL at 2/20/2024  9:11 AM, will monitor as appropriate     # Hypertension: Noted on problem list                   Disposition Plan     Expected Discharge Date: 02/21/2024                    Nemo Mulligan MD  Hospitalist Service  [facility]  Securely message with [employer] (more info)  Text page via [employer] Paging/Directory   ______________________________________________________________________    Interval History   Patient reports developing a hemorrhagic blister over anterior left shin after hitting it with a car door ~ 2 weeks ago.  Blister has been draining some dark bloody fluid.  She reports having more redness around the blister when it first developed.  She reports some chills but no fevers.    Breathing is at baseline.  Coughing up a lot of secretions.  On 3 L O2 at baseline. She recently finished a course of antibiotics for COPD flare     Physical Exam   Vital Signs: Temp: 99.2  F (37.3  C) Temp src: Oral BP: 103/52 Pulse: 80   Resp: 16 SpO2: 93 % O2 Device: Nasal cannula Oxygen Delivery: 3 LPM  Weight: 132 lbs 9.6 oz    General Appearance: NAD   Respiratory: Respirations unlabored   Cardiovascular: RRR  Skin: ~ 3 cm hemorrhagic blister over anterior left shin with surrounding erythema.  NO TTP.  Slightly warmer than the right leg   Other: Awake, alert, nonfocal      Medical Decision Making       50 MINUTES SPENT BY ME on the date of service doing chart review, history, exam, documentation & further activities per the note.  MANAGEMENT DISCUSSED with the following over the past 24 hours: patient, RN, ID       Data     I have personally reviewed the following data over the past 24 hrs:    9.8  \   11.5 (L)   / 170     142 106 18.5 /  141 (H)    4.0 31 (H) 0.67 \     Procal: N/A CRP: 4.20 Lactic Acid: N/A         Imaging results reviewed over the past 24 hrs:   Recent Results (from the past 24 hour(s))   XR Chest Port 1 View    Narrative    EXAM: XR CHEST PORT 1 VIEW  LOCATION: [facility]  DATE: 2024    INDICATION: S P Transcatheter Aortic Valve Replacement  COMPARISON: CT pulmonary angiogram 2024      Impression    IMPRESSION:     New valvular prosthesis in aortic position.     New shift of the heart and mediastinal structures to the light indicating right lung volume loss. Bronchial cut off the bronchus intermedius consistent with secretions or debris within central infrahilar right lung airways and postobstructive   atelectasis. There is a meniscus in the lateral sulcus consistent with a small right pleural effusion. No pneumothorax.    Compensatory hyperinflation of the left lung.   Echocardiogram Complete    Narrative    [id number]  [id number]  [id number]  [id number]^MIKE^[place] [address]     Name: ALVAREZ HINDS  MRN: [id number]  : 1950  Study Date: 2024 07:38 AM  Age: 74 yrs  Gender: Female  Patient Location: [facility]  Reason For Study: Aortic Valve Replacement  Ordering Physician: LUCINDA MCKEON  Referring Physician: YAMILA BRINK  Performed By: ROLA     BSA: 1.7 m2  Height: 65 in  Weight: 132 lb  HR: 77  BP: 95/55 mmHg  ______________________________________________________________________________  Procedure  Complete Echo Adult. Definity (NDC #10389-923) given intravenously.  ______________________________________________________________________________  Interpretation Summary     1. Technically difficult study (imaging could not be obtained from the  parasternal window).  2. Normal left ventricular size and systolic performance with a visually  estimated ejection fraction of 60%.  3. There is a bio-prosthetic aortic valve (documented 26 mm  Douglas Mai 3  Resilia tissue valve).  Â  Normal aortic valve prosthesis metrics with a mean systolic gradient of 4  mmHg and a peak anterograde velocity of 1.5 m/sec. The Ao Acceleration Time is  0.14 sec.  Â  No aortic insufficiency is detected.  4. Probable normal right ventricular size and systolic performance though  right-sided structures are not clearly visualized on all views on this study.  ______________________________________________________________________________  Left ventricle:  Normal left ventricular size and systolic performance with a visually  estimated ejection fraction of 60%. There is normal regional wall motion. Left  ventricular wall thickness is normal.     Assessment of LV Diastolic Function: The cumulative findings are indeterminate  in the evaluation of diastolic function.     Right ventricle:  Probable normal right ventricular size and systolic performance though right-  sided structures are not clearly visualized on all views on this study.     Left atrium:  There is borderline left atrial enlargement.     Right atrium:  The right atrium is of normal size.     IVC:  The IVC is of normal caliber.     Aortic valve:  There is a bio-prosthetic aortic valve (documented 26 mm Douglas Mai 3  Resilia tissue valve). Normal aortic valve prosthesis metrics with a mean  systolic gradient of 4 mmHg and a peak anterograde velocity of 1.5 m/sec. The  Ao Acceleration Time is 0.14 sec. No aortic insufficiency is detected.     Mitral valve:  The mitral valve appears morphologically normal. There is trace-mild mitral  insufficiency.     Tricuspid valve:  The tricuspid valve is grossly morphologically normal. There is trace  tricuspid insufficiency.     Pulmonic valve:  The pulmonic valve is grossly morphologically normal.     Thoracic aorta:  The aortic root and proximal ascending aorta are of normal dimension.     Pericardium:  There is no significant pericardial  effusion.  ______________________________________________________________________________  ______________________________________________________________________________  MMode/2D Measurements & Calculations  LVOT diam: 2.3 cm  LVOT area: 4.2 cm2  TAPSE: 1.9 cm     Doppler Measurements & Calculations  MV E max raji: 139.0 cm/sec  MV A max raji: 95.4 cm/sec  MV E/A: 1.5  MV max P.5 mmHg  MV mean PG: 3.0 mmHg  MV V2 VTI: 35.6 cm  MVA(VTI): 2.9 cm2  MV dec slope: 667.0 cm/sec2  MV dec time: 0.21 sec  Ao V2 max: 154.0 cm/sec  Ao max P.0 mmHg  Ao V2 mean: 95.8 cm/sec  Ao mean P.0 mmHg  Ao V2 VTI: 29.0 cm  ADALBERTO(I,D): 3.5 cm2  ADALBERTO(V,D): 3.3 cm2  Ao acc time: 0.14 sec  LV V1 max P.2 mmHg  LV V1 max: 124.0 cm/sec  LV V1 VTI: 24.6 cm  SV(LVOT): 102.2 ml  SI(LVOT): 61.6 ml/m2  PA V2 max: 151.0 cm/sec  PA max P.1 mmHg  PA acc time: 0.06 sec  AV Raji Ratio (DI): 0.81  ADALBERTO Index (cm2/m2): 2.1  E/E': 21.4     E/E' av.1  Lateral E/e': 14.9  Medial E/e': 21.3  Peak E' Raji: 6.5 cm/sec  RV S Raji: 15.9 cm/sec     ______________________________________________________________________________  Report approved by: Diamante Anders 2024 09:48 AM

## 2024-02-21 NOTE — PLAN OF CARE
Problem: Adult Inpatient Plan of Care  Goal: Plan of Care Review  Description: The Plan of Care Review/Shift note should be completed every shift.  The Outcome Evaluation is a brief statement about your assessment that the patient is improving, declining, or no change.  This information will be displayed automatically on your shift  note.  2/21/2024 1241 by Sherie Hood RN  Outcome: Progressing  2/21/2024 1241 by Sherie Hood RN  Outcome: Progressing     Problem: Cardiac Catheterization (Diagnostic/Interventional)  Goal: Absence of Bleeding  2/21/2024 1241 by Sherie Hood RN  Outcome: Progressing  2/21/2024 1241 by Sherie Hood RN  Outcome: Progressing     Problem: Pain Acute  Goal: Optimal Pain Control and Function  Outcome: Progressing  Intervention: Prevent or Manage Pain  Recent Flowsheet Documentation  Taken 2/21/2024 0830 by Sherie Hood RN  Sensory Stimulation Regulation: care clustered  Medication Review/Management: medications reviewed  Intervention: Optimize Psychosocial Wellbeing  Recent Flowsheet Documentation  Taken 2/21/2024 0830 by Sherie Hood RN  Supportive Measures: active listening utilized     Problem: Infection  Goal: Absence of Infection Signs and Symptoms  Outcome: Progressing     Problem: Adult Inpatient Plan of Care  Goal: Absence of Hospital-Acquired Illness or Injury  Intervention: Identify and Manage Fall Risk  Recent Flowsheet Documentation  Taken 2/21/2024 0830 by Sherie Hood RN  Safety Promotion/Fall Prevention:   activity supervised   safety round/check completed     Problem: Risk for Delirium  Goal: Optimal Coping  Intervention: Optimize Psychosocial Adjustment to Delirium  Recent Flowsheet Documentation  Taken 2/21/2024 0830 by Sherie Hood RN  Supportive Measures: active listening utilized  Goal: Improved Behavioral Control  Intervention: Prevent and Manage Agitation  Recent Flowsheet Documentation  Taken 2/21/2024  0830 by Sherie Hood RN  Environment Familiarity/Consistency: daily routine followed  Intervention: Minimize Safety Risk  Recent Flowsheet Documentation  Taken 2/21/2024 0830 by Sherie Hood RN  Communication Enhancement Strategies: call light answered in person  Enhanced Safety Measures: other (see comments)  Goal: Improved Attention and Thought Clarity  Intervention: Maximize Cognitive Function  Recent Flowsheet Documentation  Taken 2/21/2024 0830 by Sherie Hood RN  Sensory Stimulation Regulation: care clustered  Reorientation Measures: clock in view   Goal Outcome Evaluation:       Patient is alert and able to let her needs be known. Denies any pain when asked. POST TAVR:  bilateral groin sites WNL. CMS and pulses intact. Left shin has wound. ID was consulted and ordered oral antibiotics. Wound nurse consult placed. VSS and up assist of one. Denies dizziness today. Continue plan of care.

## 2024-02-21 NOTE — CONSULTS
Care Management Initial Consult    General Information  Assessment completed with: PatientMary Kay  Type of CM/SW Visit: Initial Assessment    Primary Care Provider verified and updated as needed: Yes   Readmission within the last 30 days: planned readmission   Return Category: Planned Surgery  Reason for Consult: discharge planning  Advance Care Planning: Advance Care Planning Reviewed: no concerns identified          Communication Assessment  Patient's communication style: spoken language (English or Bilingual)             Cognitive  Cognitive/Neuro/Behavioral: WDL              Best Language: 0 - No aphasia  Speech: logical, clear    Living Environment:   People in home: child(david), adult  son hazel and his girlfriend  Current living Arrangements: house      Able to return to prior arrangements: yes       Family/Social Support:  Care provided by: self, other (see comments), child(david) (PCA)  Provides care for: no one, unable/limited ability to care for self  Marital Status:   Children          Description of Support System: Supportive, Involved    Support Assessment: Adequate family and caregiver support, Adequate social supports    Current Resources:   Patient receiving home care services: No     Community Resources: County Worker, [employer], PCA, Housekeeping/Chore Agency, DME (son's girlfriend Cynthia who lives with us is my PCA for 15.5 hr/week from [facility]. [facility] for home oxygen 2.5LPM)  Equipment currently used at home: shower chair, walker, rolling (PRN use of FWW)  Supplies currently used at home:      Employment/Financial:  Employment Status: retired        Financial Concerns: none   Referral to Financial Worker: No       Does the patient's insurance plan have a 3 day qualifying hospital stay waiver?  No    Lifestyle & Psychosocial Needs:  Social Determinants of Health     Food Insecurity: Low Risk  (12/26/2023)    Food Insecurity     Within the past 12  "months, did you worry that your food would run out before you got money to buy more?: No     Within the past 12 months, did the food you bought just not last and you didn t have money to get more?: No   Depression: Not at risk (1/29/2024)    PHQ-2     PHQ-2 Score: 0   Housing Stability: Low Risk  (12/26/2023)    Housing Stability     Do you have housing? : Yes     Are you worried about losing your housing?: No   Tobacco Use: High Risk (2/20/2024)    Patient History     Smoking Tobacco Use: Some Days     Smokeless Tobacco Use: Never     Passive Exposure: Never   Financial Resource Strain: Low Risk  (12/26/2023)    Financial Resource Strain     Within the past 12 months, have you or your family members you live with been unable to get utilities (heat, electricity) when it was really needed?: No   Alcohol Use: Not on file   Transportation Needs: Low Risk  (12/26/2023)    Transportation Needs     Within the past 12 months, has lack of transportation kept you from medical appointments, getting your medicines, non-medical meetings or appointments, work, or from getting things that you need?: No   Physical Activity: Not on file   Interpersonal Safety: Low Risk  (9/20/2023)    Interpersonal Safety     Do you feel physically and emotionally safe where you currently live?: Yes     Within the past 12 months, have you been hit, slapped, kicked or otherwise physically hurt by someone?: No     Within the past 12 months, have you been humiliated or emotionally abused in other ways by your partner or ex-partner?: No   Stress: Not on file   Social Connections: Not on file       Functional Status:  Prior to admission patient needed assistance:   Dependent ADLs:: Ambulation-walker, Bathing (\"my PCA would help me with dressing and grooming if I need the help)  Dependent IADLs:: Cleaning, Cooking, Laundry, Shopping, Meal Preparation, Transportation, Medication Management       Mental Health Status:      Denies concerns    Chemical " "Dependency Status:      Denies concerns          Values/Beliefs:  Spiritual, Cultural Beliefs, Uatsdin Practices, Values that affect care:                 Additional Information:  CM met with patient in patient's room, introduced self and identified role.  CM confirmed the below information from previous hospitalization this month:    Patient \"lives in a house with her son Heriberto and Heriberto' girlfriend Cynthia.     She has a PCA for help with some ADLs and most IADLs. \"My PCA is my son's girlfriend Cynthia who lives with us is my PCA for 15.5 hr/week from [facility]. Cynthia my PCA would help me with dressing and grooming if I need the help and help me with whatever I needed even above the weekly hours scheduled.\"     \"I mostly use my 4WW, but I also have a cane if needed\". [facility] for home oxygen 2.5LPM.         Family to transport at discharge.\"       Plan is OP Cardiac Rehab.    CM to follow for medical progression of care, discharge recommendations, and final discharge plan.    Aleta Sheth RN      "

## 2024-02-21 NOTE — DISCHARGE INSTRUCTIONS
Patient Instructions - Going Home after TAVR:    Going Home: It s normal to feel a little anxious after leaving the hospital and when fewer people are nearby. People do much better when they feel as though they have support.  If you live alone we suggest you arrange to have someone stay with you for a day or two to help you recover.     Medications:  Take all of your medications as directed in your discharge summary. Do not stop taking these medications without speaking with your cardiologist. If you have any questions about any of your medications, speak to your pharmacist or provider.     Follow up Appointments  You will follow up with Alexandria Lynn NP on 2/29/2024 at 12:50 am.  You will have a repeat echocardiogram on 3/21/2024 at 12:45 pm.  You will have a follow up with Margy Rea PA-C on 3/28/2024 at 2:10 pm, for labs and visit (you DON'T have to fast for these labs).      If you need to reschedule any of these appointments, please call:  [phone]    See your regular cardiologist in 6 months.  Your regular heart doctor will continue to be your heart specialist.   See your family doctor in 2 weeks.  Make an appointment once you get home.  You will receive a temporary  heart valve  wallet card. We recommend that you keep it in your wallet or purse at all times. You will be receiving a permanent wallet card from the  within 6 months.   Before an MRI (magnetic resonance imaging) procedure, always notify the doctor (or medical technician) that you have an implanted heart valve.     Site Care: Shower every day.  Let the soap and water run over your incision or access site, but do not rub the area. No tub baths, pools or hot-tubs for the 1st week you are at home. Do not put ointments, powders, or lotions on your access site and/or incision.  It is normal to feel a small lump the size of a marble in the groin access site.  That is the closure device used to close the  vein/artery.  If you are experiencing discomfort, bleeding, drainage, redness or increasing swelling, please call us.    Dental Work: Avoid major dental work for the next 6 months if possible. You will need antibiotics before any dental cleanings, work or surgery. This will decrease the risk of infection.     Driving:  You should not drive for the first 2 weeks after your procedure. The first time you drive, you must have someone with you. You may ride in a car immediately after your procedure.      Activity: People recover at different rates depending on their general health and the type of heart valve procedure. Most people take about 4-10 weeks to feel fully recovered. Daily activity and exercise are an important part of your recovery.  Do not lift, push or pull anything > 10 lb. for the first 2 weeks.        CALL THE VALVE CLINIC IF YOU HAVE ANY QUESTIONS OR CONCERNS:  [phone]  For the first 2 weeks after TAVR     Do Not:   Lift, push, or pull more than ten pounds (including pets, laundry, groceries, etc)  Garden, including lawn mowing and raking  Excessively bear down or strain when having a bowel movement   Ride a bike   Do:  Weigh yourself every day in the morning after using the bathroom.  If you notice weight gain of more than 3 pounds in 1 day or more than 5 pounds in 1 week, call the cardiology clinic.   Get up and get dressed every day. Do not stay in bed.  Set a daily routine.   Walk as much as you can. You may walk up and down stairs. When you are tired, rest.   Cough and deep breathe. Use your incentive spirometer 5-10 times each hour when you are awake.   We strongly recommend you participate in a cardiac rehabilitation program. This type of program will help you learn about your heart health, prevent more heart problems, participate in safe and heart-healthy activities, and learn how to return to your activities of daily living and hobbies.   Let the cardiology clinic know if you need to leave  town before your first follow-up visit.     When to call the Cardiology Clinic to speak with a nurse?   Swelling of the legs, ankles, or feet  Increasing shortness of breath  Change in the color or temperature of your lower legs and feet  Abdominal pain or unrelieved nausea and/or vomiting  Redness and warmth around your access site and/or incision that does not go away  Yellow or green drainage from your access site and/or incision   Weight gain of 3 pounds in one day or 5 pounds in one week  Develop any numbness, tingling, or limited movement of your arms or legs.   Chest pain that does not go away  New confusion or you cannot think clearly  Fever and chills         [facility]:  [phone]  If you are calling after hours, please listen to the entire voicemail, a live  will answer at the end of the message       [facility] DISCHARGE INSTRUCTIONS:  Left anterior shin   Cleanse with saline and gently pat dry  Cover with Adaptic, abd pad, wrap with kerlix, and secure with tape  Change daily      Please call the pulmonary clinic if you would like to discuss your COPD, emphysema and pleural effusion management.  [phone] and ask to be seen by Dr. Gardiner (pulmonologist who saw you when you were in the hospital)

## 2024-02-21 NOTE — TREATMENT PLAN
RCAT Treatment Plan    Patient Score: 10  Patient Acuity: 4    Clinical Indication for Therapy: prevent atelectasis    Therapy Ordered: Duoneb/MM Q4/Volara QID    Assessment Summary: Patient awake alert and able to speak in full sentences. Instructed on Flutter Valve. BS diminished throughout before and after with strong congested non productive cough. Will start Duoneb/MM/Volara. Will reassess in 72 hours or sooner if patient status changes.                      Renetta Gutierrez, RT  02/21/2024

## 2024-02-21 NOTE — CONSULTS
[facility]  General ID Service Consult      Patient: Mary Kay Tejada  YOB: 1950, MRN: [id number]  Date of Admission:  2/20/2024  Date of Consult: 02/21/2024  Consult Requested by: Moises Valencia MD  Admission Diagnosis: Severe aortic stenosis [I35.0]  Aortic stenosis, severe [I35.0]  Consult Question: s/p TAVR, coverage for left leg wound with erythema and recurrent R pleural effusion     ID Assessment & Plan     Possibly small cellulitis rt leg on admission yesterday  Today exam not impressive  Got augmentin recently (for copd)  COVID pos 1/11    PLAN  Discontinue IV ceftriaxone  Po omnicef x 3 days    Will sign off, please call with questions        Mary Garcia MD  [facility]  ______________________________________________________________________        History of Present Illness   Per cardiology   74 year old female who comes in today for history and physical prior to TAVR (transcatheter aortic valve replacement).        Mary Kay has past medical history significant for COPD on nocturnal oxygen (2.5 to 3 L), ongoing tobacco abuse, severe aortic stenosis, coronary artery disease, atrial fibrillation/flutter, osteoarthritis, fibromyalgia/chronic pain, GERD and recurrent pleural effusions.  She was admitted to the hospital about a week ago with dizziness and shortness of breath and was treated for COPD exacerbation.  She underwent thoracentesis yielding 1 L of clear yellow fluid and had significant improvement in her respiratory status.  She has now finished a course of Augmentin and was started on furosemide during hospitalization.  She says her breathing has been stable since being home from the hospital.  She continues to smoke ~ 4 cigarettes per day.     Mary Kay Tejada has chronic shortness of breath and this did get better after thoracentesis earlier this month.  She also has dizziness.  She denies chest discomfort, palpitations,  "paroxysmal nocturnal dyspnea, orthopnea,pre-syncope, or syncope, weight loss, changes in appetite, nausea or vomiting. \"    She ht her leg against car door two week ago  When hospitalized 2/6, discharged on augmentin x3  (CTX in hospital)  No fevers  Today she feels her leg loks better than admission day yesterday,  When given CTX and CFZ      Review of Systems   The 10 point Review of Systems is negative other than noted in the HPI or here.     Past Medical History    Past Medical History:   Diagnosis Date    Anemia     Aortic stenosis     Atrial fibrillation (H)     Atrial flutter (H)     Benign neoplasm of adenomatous polyp     large intestine     Chronic constipation     Chronic pain syndrome     COPD (chronic obstructive pulmonary disease) (H)     Oxygen at night     Dependence on supplemental oxygen     Oxygen at noc, during the day as needed    Depression     Diabetes mellitus (H)     Dry eye syndrome     Fibromyalgia     Ganglion     left wrist    GERD (gastroesophageal reflux disease)     Hyperlipidemia     Hypertension     Hypokalemia     Infective otitis externa, unspecified     Created by Conversion     Larynx edema     Lung disease     Malignant neoplasm of vulva (H)     Created by Conversion [facility] Annotation: Apr 17 2007  8:24AM - Cammy Bui:  resection per Dr. Alfonso Mane 9/06;  Replacement Utility updated for latest IMO load    Medial epicondylitis     Onychomycosis     Osteoarthritis     Peptic ulcer     Polyneuropathy     Vulvar malignant neoplasm (H)        Past Surgical History   Past Surgical History:   Procedure Laterality Date    BIOPSY BREAST Right     BIOPSY BREAST Right 01/28/2015    BIOPSY BREAST Right 1/28/2015    Procedure: RIGHT BREAST BIOPSY AFTER WIRE LOCALIZATION AT 0940;  Surgeon: Renée Soriano MD;  Location: [facility];  Service:     BIOPSY OF BREAST, INCISIONAL      Description: Incisional Breast Biopsy;  Recorded: 11/13/2007;  Comments: benign    " COLONOSCOPY N/A 6/14/2019    Procedure: COLONOSCOPY;  Surgeon: Eduardo Mora MD;  Location: [facility];  Service: Gastroenterology    CV CORONARY ANGIOGRAM N/A 2/8/2024    Procedure: CV CORONARY ANGIOGRAM;  Surgeon: Moises Valencia MD;  Location: [facility] LAB CV    CV LEFT HEART CATH N/A 2/8/2024    Procedure: Left Heart Catheterization;  Surgeon: Moises Valencia MD;  Location: [facility] LAB CV    CV RIGHT HEART CATH MEASUREMENTS RECORDED N/A 2/8/2024    Procedure: Right Heart Catheterization;  Surgeon: Moises Valencia MD;  Location: [facility] LAB CV    ESOPHAGOSCOPY, GASTROSCOPY, DUODENOSCOPY (EGD), COMBINED N/A 11/6/2018    Procedure: ESOPHAGOGASTRODUODENOSCOPY;  Surgeon: Lit Fernando MD;  Location: [facility];  Service:     HYSTERECTOMY      JOINT REPLACEMENT Left     TKA    PICC TRIPLE LUMEN PLACEMENT  1/12/2023         TX ABLATE HEART DYSRHYTHM FOCUS      Description: Catheter Ablation Atrial Fibrillation;  Recorded: 07/31/2012;  Comments: 7/24/12 PVI with Dr. Gardiner and nilay to all 5 pulm veins and CTI fl ablation line as well.    ZZC SUPRACERV ABD HYSTERECTOMY      Description: Supracervical Hysterectomy;  Proc Date: 01/01/1985;  Comments: some cervix left!; ovaries intact; done for bleeding       Social History   Social History     Tobacco Use    Smoking status: Some Days     Packs/day: .25     Types: Cigarettes     Passive exposure: Never    Smokeless tobacco: Never    Tobacco comments:     seen by TTS inpatient on 3/31/22   Vaping Use    Vaping Use: Never used   Substance Use Topics    Alcohol use: Yes     Comment: Alcoholic Drinks/day: very little    Drug use: No       Family History   I have reviewed this patient's family history and updated it with pertinent information if needed.  Family History   Problem Relation Age of Onset    Heart Failure Mother     Cancer Other         paternal HX-laryngeal     Alcoholism Sister     No Known Problems Daughter     No  "Known Problems Maternal Grandmother     No Known Problems Maternal Grandfather     No Known Problems Paternal Grandmother     No Known Problems Paternal Grandfather     No Known Problems Maternal Aunt     No Known Problems Paternal Aunt     Alcoholism Sister     Alcoholism Brother     Alcoholism Father     Cancer Paternal Uncle         Gastric-Alcohol    Cancer Paternal Uncle         gastric-Alcohol    Hereditary Breast and Ovarian Cancer Syndrome No family hx of     Breast Cancer No family hx of     Colon Cancer No family hx of     Endometrial Cancer No family hx of     Ovarian Cancer No family hx of        Medications   I have reviewed this patient's current medications    Allergies   Allergies   Allergen Reactions    Celebrex [Celecoxib] Rash     patient had butterfly rash - \"lupus-like\"      Latex Rash       Physical Exam   Vital Signs: Temp: 99.2  F (37.3  C) Temp src: Oral BP: 103/52 Pulse: 80   Resp: 16 SpO2: 93 % O2 Device: Nasal cannula Oxygen Delivery: 3 LPM  Weight: 132 lbs 9.6 oz    Gen. appearance nontoxic  Eyes no conjunctivitis or icterus  Neck no stiffness or neck vein distention, no LN  Heart  No edema  Lungs breathing comfortably  Abdomen soft not tender  Extremities no edema  Rt leg: blood blister, minimal redness surrounding it, not bot not tender  Skin  no rash or emboli  Neurologic alert oriented no focal deficits      Data   Inflammatory Markers   Recent Labs   Lab Test 02/21/24  0432   CRPI 4.20        Hematology Studies   Recent Labs   Lab Test 02/21/24  0432 02/20/24  0911 02/19/24  1340 02/10/24  0425 02/07/24  0848 02/06/24  1953   WBC 9.8 7.9 7.7 6.3 4.0 5.2   HGB 11.5* 12.9 13.4 12.5 13.9 14.0   MCV 95 96 96 95 96 94    207 217 150 154 151       Metabolic Studies   Recent Labs   Lab Test 02/21/24  0432 02/20/24  0911 02/19/24  1340 02/10/24  0425 02/08/24  1103    141 142 142 142   POTASSIUM 4.0 4.3 5.1 4.0 4.1   CHLORIDE 106 105 104 104 103   CO2 31* 30* 31* 31* 34*   BUN " "18.5 13.7 14.1 24.2* 32.4*   CR 0.67 0.67 0.65 0.74 0.78   GFRESTIMATED >90 >90 >90 84 79       Hepatic Studies    Recent Labs   Lab Test 02/20/24  0911 02/19/24  1340 02/06/24  1953 09/20/23  1136 06/30/23  1352 06/02/23  0057   BILITOTAL 0.4 0.4 0.6 0.6 0.5 0.7   ALKPHOS 92 100 88 98 101 98   ALBUMIN 3.0* 3.5 3.6 4.0 3.3* 2.9*   AST 18 18 20 24 25 32   ALT 11 15 13 10 16 27       Most Recent 6 Bacteria Isolates From Any Culture (See EPIC Reports for Culture Details):No lab results found.    Urine Studies    Recent Labs   Lab Test 02/06/24  2126 06/02/23  0138 02/08/23  1334   LEUKEST 75 Mahesh/uL* 75 Mahesh/uL* Negative   WBCU 2 1 <1       Vancomycin Levels  No lab results found.    Invalid input(s): \"VANCO\"    Hepatitis B Testing No lab results found.  Hepatitis C Testing     Hepatitis C Antibody   Date Value Ref Range Status   06/22/2020 Negative Negative Final     HIVTesting No lab results found.    Respiratory Virus Testing    No results found for: \"RS\", \"FLUAG\"  COVID-19 Antibody Results, Testing for Immunity           No data to display              COVID-19 PCR Results          3/26/2022    19:41 1/11/2023    00:11 5/24/2023    10:20 2/6/2024    20:40   COVID-19 PCR Results   SARS CoV2 PCR Negative  Positive  Negative  Negative        "

## 2024-02-21 NOTE — PROGRESS NOTES
TRANSITIONS OF CARE (LETICIA) LOG    LETICIA tasks should be completed by the CC within one (1) business day of notification of each transition. Follow up contact with member is required after return to their usual care setting.  Note:  If CC finds out about the transitions fifteen (15) days or more after the member has returned to their usual care setting, no LETICIA log is needed. However, the CC should check in with the member to discuss the transition process, any changes needed to the care plan and document it in a case note.     Member Name:  Mary Kay Tejada O Name:  [employer] Member ID#: [id number]   Product: [facility] Care Coordinator Contact:  Samantha Alexandra RN, PHN Agency/County/Care System: [facility]   Transition Communication Actions from Care Management Contact   Transition #1   Notification Date: 2/21/24 Transition Date:   2/20/24 Transition From: Home     Is this the member s usual care setting?               yes Transition To: [facility]   Transition Type:  Planned    Documentation from conversation with the member/responsible party, provider, discharging and receiving facility:   Date: 2/21/24: Received notification of admission to hospital for Transfemoral transcatheter aortic valve replacement.  CC contacted Hospital /discharge plannerAleta via the [facility] Transitional Care Hand-In Process, with community care plan included.  CC reached out to member regarding transition and left a message requesting a return call.  Reviewed and update care plan as needed.  Notified community service providers and placed services Homemaking PCA on hold as needed.  Transition log initiated.   PCP, Cammy Bui, notified of hospitalization via EMR.     Transition #2   Notification Date: 2/27/24 Transition Date:   2/23/24 Transition From: [facility]     Is this the member s usual care setting?                no Transition To: Home   Transition Type:  Planned                                             *RETURN TO USUAL CARE SETTING: *Complete tasks below when the member is discharging TO their usual care setting within one (1) business day of notification..      For situations where the Care Coordinator is notified of the discharge prior to the date of discharge, the Care Coordinator must follow up with the member or designated representative to confirm that discharge actually occurred and discuss required LETICIA tasks as outlined in the LETICIA Instructions.  (This includes situations where it may be a  new  usual care setting for the member. (i.e., a community member who decides upon permanent nursing home placement following hospitalization and rehab).    Discuss with Member/Responsible Party:    Check  Yes  - if the member, family member and/or SNF/facility staff manages the following:    If  No  provide explanation in the comments section.          Date completed: 3/1/24 Communicated with member or their designated representative about the following:  care transition process; about changes to the member s health status; plan of care updates; education about transitions and how to prevent unplanned transitions/readmissions    Four Pillars for Optimal Transition:    Check  Yes  - if the member, family member and/or SNF/facility staff manages the following:    If  No  provide explanation in the comments section.          [x]  Yes     []  No Does the member have a follow-up appointment scheduled with primary care or specialist? (Mental health hospitalizations--the appt. should be w/in 7 days)              For mental health hospitalizations:  []  Yes     []  No     Does the member have a follow-up appointment scheduled with a mental health practitioner within 7 days of discharge?  [x]  Yes     []  No     Has a medication review been completed with member? If no, refer to PCP, home care nurse, MTM, pharmacist  [x]  Yes      []  No     Can the member manage their medications or is there a system in place to manage medications (e.g. home care set-up)?         [x]  Yes     []  No     Can the member verbalize warning signs and symptoms to watch for and how to respond?  [x]  Yes     []  No     Does the member have a copy of and understand their discharge instructions?  If no, assist to obtain copy of discharge instructions, review discharge instructions, and assist to contact PCP to discuss questions about their recent hospitalization.  [x]  Yes     []  No     Does the member have adequate food, housing and transportation?  If no, add goal and discuss additional supports available to the member                                                                                                                                                                                 [x]  Yes     []  No     Is the member safe in their home?  If no, document needs and support provided                                                                                                                                                                          []  Yes     [x]  No     Are there any concerns of vulnerability, abuse, or neglect?  If yes, document concerns and actions taken by Care Coordinator as a mandated                                                                                                                                                                              [x]  Yes     []  No     Does the member use a Personal Health Care Record?  Check  Yes  if visit summary, discharge summary, and/or healthcare summary are being used as a PHR.                                                                                                                                                                                  [x]  Yes     []  No     Have you reviewed the discharge summary with the member? If  No  provide explanation in  "comments.  [x]  Yes     []  No     Have you updated the member s care plan/support plan? Add new diagnosis, medications, treatments, goals & interventions, as applicable. If No, provide explanation in comments.    Comments:           Notes from conversation with the member/responsible party, provider, discharging and receiving facility (as applicable):     2/28/24: Attempted to reach member, no answer and left voice mail.     3/1/24: Member reports doing \"okay\" and no more dizziness after the procedure. She will use [place] transportation as needed to follow up appts. Son and Cynthia will be able to provide transportation to clinic.     Reviewed ADLs/IADLs, no change. Will continue current PCA and homemaking services.    She needs pull ups. Will send telephone encounter to PCP.          Samantha Alexandra RN, N   [facility]  [phone]    "

## 2024-02-21 NOTE — PLAN OF CARE
"  Problem: Adult Inpatient Plan of Care  Goal: Plan of Care Review  Description: The Plan of Care Review/Shift note should be completed every shift.  The Outcome Evaluation is a brief statement about your assessment that the patient is improving, declining, or no change.  This information will be displayed automatically on your shift  note.  Outcome: Progressing  Flowsheets (Taken 2/20/2024 2210)  Plan of Care Reviewed With:   patient   family  Goal: Patient-Specific Goal (Individualized)  Description: You can add care plan individualizations to a care plan. Examples of Individualization might be:  \"Parent requests to be called daily at 9am for status\", \"I have a hard time hearing out of my right ear\", or \"Do not touch me to wake me up as it startles  me\".  Outcome: Progressing  Goal: Absence of Hospital-Acquired Illness or Injury  Outcome: Progressing  Goal: Optimal Comfort and Wellbeing  Outcome: Progressing  Goal: Readiness for Transition of Care  Outcome: Progressing     Problem: Risk for Delirium  Goal: Optimal Coping  Outcome: Progressing  Goal: Improved Behavioral Control  Outcome: Progressing  Goal: Improved Attention and Thought Clarity  Outcome: Progressing  Goal: Improved Sleep  Outcome: Progressing     Goal Outcome Evaluation:      Plan of Care Reviewed With: patient, family    Pt A/O x 4, no changes noted to continued neuro checks. Vitally stable & saturating well on 3LPM via the NC - pt denies SOB. Tele shows NSR. Pt had TAVR with bilateral groin access - groin sites soft, non-tender, & free of hematoma formation. Scant strike-through saturating dressings, otherwise no further bleeding. LE CMS altered at baseline due to neuropathy but pt endorses no changes to sensation & pedal pulses are strong. Up OOB with assist of 1 - endorses minor dizziness with positional changes, activity otherwise well tolerated. Denies pain, nausea, or vomiting. Continent & able to void spontaneously. Will continue to " monitor.     Mateo Zhu RN on 2/20/2024 at 10:20 PM

## 2024-02-21 NOTE — PROGRESS NOTES
Cardiac Rehab Discharge Summary    Reason for therapy discharge:    All goals and outcomes met, no further needs identified.    Progress towards therapy goal(s). See goals on Care Plan in [facility] electronic health record for goal details.  Goals met    Therapy recommendation(s):    Continued therapy is recommended.  Rationale/Recommendations:  Phase 2 CR'.       02/21/24 0930   Appointment Info   Signing Clinician's Name / Credentials (OT) Nalini Sher OTR/JOSE   Living Environment   People in Home child(david), adult  (son and son's girlfriend who is pt's PCA)   Home Accessibility stairs to enter home   Number of Stairs, Main Entrance 3   Stair Railings, Main Entrance railings on both sides of stairs   Living Environment Comments remains on main level, does short walks around home for exercise during day   Self-Care   Usual Activity Tolerance moderate   Current Activity Tolerance fair   Equipment Currently Used at Home shower chair;walker, rolling  (PRN use of FWW)   Activity/Exercise/Self-Care Comment ind for BADLs but makes sure someone is home when she showers   Instrumental Activities of Daily Living (IADL)   IADL Comments assist for all IADLs   General Information   Onset of Illness/Injury or Date of Surgery 02/20/24   Referring Physician Margy Rea PA-C   Patient/Family Therapy Goal Statement (OT) go home   Additional Occupational Profile Info/Pertinent History of Current Problem s/p TAVR, also work-up for LLE blister. PMH Chronic diastolic heart failure, Paroxysmal atrial fibrillation, CAD, COPD, Fibromyalgia   Existing Precautions/Restrictions cardiac   Cognitive Status Examination   Orientation Status orientation to person, place and time   Affect/Mental Status (Cognitive) WFL   Follows Commands WFL   Sensory   Sensory Comments bilateral numbness/tingling in feet   Pain Assessment   Patient Currently in Pain No   Range of Motion Comprehensive   General Range of Motion bilateral upper extremity ROM WFL    Strength Comprehensive (MMT)   Comment, General Manual Muscle Testing (MMT) Assessment BUE WFL   Bed Mobility   Bed Mobility supine-sit;sit-supine   Supine-Sit [place] (Bed Mobility) supervision   Sit-Supine [place] (Bed Mobility) supervision   Transfers   Transfers toilet transfer;sit-stand transfer   Sit-Stand Transfer   Sit-Stand [place] (Transfers) supervision   Toilet Transfer   Type (Toilet Transfer) sit-stand;stand-sit   [place] Level (Toilet Transfer) supervision   Balance   Balance Comments SBA for mobility to/from bathroom without AD   Activities of Daily Living   BADL Assessment/Intervention lower body dressing;toileting   Lower Body Dressing Assessment/Training   [place] Level (Lower Body Dressing) doff;don;shoes/slippers;set up   Toileting   [place] Level (Toileting) perform perineal hygiene;supervision   Clinical Impression   Criteria for Skilled Therapeutic Interventions Met (OT) Yes, treatment indicated   OT Diagnosis dec functional mobility   OT Problem List-Impairments impacting ADL problems related to;activity tolerance impaired;mobility;post-surgical precautions   Assessment of Occupational Performance 1-3 Performance Deficits   Identified Performance Deficits household mobility, health management, community mobility   Planned Therapy Interventions (OT) home program guidelines;progressive activity/exercise;risk factor education   Clinical Decision Making Complexity (OT) problem focused assessment/low complexity   Risk & Benefits of therapy have been explained evaluation/treatment results reviewed;participants included;patient   OT Total Evaluation Time   OT Eval, Low Complexity Minutes (18758) 10   OT Goals   Therapy Frequency (OT) One time eval and treatment   OT Predicted Duration/Target Date for Goal Attainment 02/21/24   OT Goals Cardiac Phase 1   OT: Understanding of cardiac education to maximize quality of life, condition management, and health outcomes  Patient;Verbalize;Demonstrate   OT: Perform aerobic activity with stable cardiovascular response intermittent;5 minutes;ambulation   OT: Functional/aerobic ambulation tolerance with stable cardiovascular response in order to return to home and community environment Supervision/SBA;100 feet;Rolling walker   OT: Navigation of stairs simulating home set up with stable cardiovascular response in order to return to home and community environment Supervision/SBA;3 stairs;Rail on both sides   Self-Care/Home Management   Self-Care/Home Mgmt/ADL, Compensatory, Meal Prep Minutes (64772) 10   Symptoms Noted During/After Treatment (Meal Preparation/Planning Training) none   Treatment Detail/Skilled Intervention see cardiac educ   Therapeutic Procedures/Exercise   Therapeutic Procedure: strength, endurance, ROM, flexibillity minutes (49028) 16   Symptoms Noted During/After Treatment none   Treatment Detail/Skilled Intervention see amb   Treatment Time Includes (CR Only) Monitoring of vital signs (see vital signs flowsheet for details)   Ambulation   Workload 150ft   Symptoms Denies symptoms   Cardiovascular Response Normal   Exercise Details SBA fajardo walk with FWW. no further distance completed as this is distance pt typically walks at home.   Vital Signs Details WNL, BP remains 100's/50's, SpO2 remains at least 92% on 3L.   Stairs   Workload 3   Symptoms Denies symptoms   Cardiovascular Response Normal   Exercise Details up/down SBA with bilateral railings   Vital Signs Details se amb vitals details   Cardiac Education   Education Provided Energy conservation;Home exercise program;OMNI Scale;Outpatient Cardiac Rehab;Precautions;Resuming home activities;Signs and symptoms;Stop light tool   Education Packet Given to Patient Yes   All Patient Education Handouts Reviewed with Patient and/or Family Yes   Cardiac Rehab Phase II Plan   Phase II Order Received Yes   Phase II Appointment Status Scheduled   Date/Time 3/7   Location  [facility]   OT Discharge Planning   OT Plan d/c   OT Discharge Recommendation (DC Rec) home with outpatient cardiac rehab   OT Rationale for DC Rec lives with son and son's girlfriend who is pt's PCA   OT Brief overview of current status SBA BADLs   Total Session Time   Timed Code Treatment Minutes 26   Total Session Time (sum of timed and untimed services) 36

## 2024-02-21 NOTE — PLAN OF CARE
Problem: Adult Inpatient Plan of Care  Goal: Absence of Hospital-Acquired Illness or Injury  Intervention: Prevent Infection  Recent Flowsheet Documentation  Taken 2/21/2024 0418 by Supa Cervantes RN  Infection Prevention: rest/sleep promoted  Taken 2/20/2024 2347 by Supa Cervantes RN  Infection Prevention: rest/sleep promoted     Problem: Risk for Delirium  Goal: Improved Attention and Thought Clarity  Outcome: Progressing  Intervention: Maximize Cognitive Function  Recent Flowsheet Documentation  Taken 2/20/2024 2347 by Supa Cervantes RN  Sensory Stimulation Regulation:   care clustered   lighting decreased  Reorientation Measures: clock in view         Problem: Cardiac Catheterization (Diagnostic/Interventional)  Goal: Absence of Bleeding  Outcome: Progressing       Goal Outcome Evaluation:  Patient denied chest pain, SOB, and dizziness. Has SR with 1st degree AVB. VSS. No fever overnight. Patient's using baseline 3L oxygen at night via nasal cannula. Patient's TAVR sites have no bleeding/hematoma. Neuro intact. Patient has numbness and tingling on LE at baseline. Patient is SBA in the room. Call-light within reach. Can make needs known.

## 2024-02-21 NOTE — PROGRESS NOTES
[facility] Care Coordination Contact    [facility]  Ambulatory Care Coordination to Inpatient Care Management   Hand-In Communication    Date:  February 21, 2024  Name: Mary Kay Tejada is enrolled in [facility] Care Coordination program and I am the Lead Care Coordinator.  CC Contact Information:.   Payor Source: Payor: MEDICA / Plan: MEDICA [employer]/ [employer]ERS / Product Type: HMO /   Current services in place:     Please see the CC [place] and Care Management Flowsheets for specific  details of this Mary Kay Tejada care plan.   Additional details/specific concerns r/t this admission:    No additional concerns at this time Member has PCA and homemaking services through [facility]. PCA 2.25 hrs daily and homemaking 3 hrs weekly.      I will follow this admission in Epic. Please feel free to contact me with questions or for further collaboration in discharge planning.    Samantha Alexandra RN, PHN   [facility]  [phone]

## 2024-02-22 ENCOUNTER — APPOINTMENT (OUTPATIENT)
Dept: RADIOLOGY | Facility: HOSPITAL | Age: 74
DRG: 266 | End: 2024-02-22
Attending: INTERNAL MEDICINE
Payer: COMMERCIAL

## 2024-02-22 LAB
ANION GAP SERPL CALCULATED.3IONS-SCNC: 4 MMOL/L (ref 7–15)
ATRIAL RATE - MUSE: 69 BPM
BUN SERPL-MCNC: 13.1 MG/DL (ref 8–23)
CALCIUM SERPL-MCNC: 8.9 MG/DL (ref 8.8–10.2)
CHLORIDE SERPL-SCNC: 105 MMOL/L (ref 98–107)
CREAT SERPL-MCNC: 0.66 MG/DL (ref 0.51–0.95)
DEPRECATED HCO3 PLAS-SCNC: 32 MMOL/L (ref 22–29)
DIASTOLIC BLOOD PRESSURE - MUSE: NORMAL MMHG
EGFRCR SERPLBLD CKD-EPI 2021: >90 ML/MIN/1.73M2
ERYTHROCYTE [DISTWIDTH] IN BLOOD BY AUTOMATED COUNT: 14.7 % (ref 10–15)
GLUCOSE BLDC GLUCOMTR-MCNC: 103 MG/DL (ref 70–99)
GLUCOSE BLDC GLUCOMTR-MCNC: 104 MG/DL (ref 70–99)
GLUCOSE BLDC GLUCOMTR-MCNC: 120 MG/DL (ref 70–99)
GLUCOSE BLDC GLUCOMTR-MCNC: 93 MG/DL (ref 70–99)
GLUCOSE SERPL-MCNC: 132 MG/DL (ref 70–99)
HCT VFR BLD AUTO: 37.7 % (ref 35–47)
HGB BLD-MCNC: 11.6 G/DL (ref 11.7–15.7)
INTERPRETATION ECG - MUSE: NORMAL
MAGNESIUM SERPL-MCNC: 1.9 MG/DL (ref 1.7–2.3)
MCH RBC QN AUTO: 30.2 PG (ref 26.5–33)
MCHC RBC AUTO-ENTMCNC: 30.8 G/DL (ref 31.5–36.5)
MCV RBC AUTO: 98 FL (ref 78–100)
P AXIS - MUSE: 38 DEGREES
PLATELET # BLD AUTO: 143 10E3/UL (ref 150–450)
POTASSIUM SERPL-SCNC: 4.3 MMOL/L (ref 3.4–5.3)
PR INTERVAL - MUSE: 220 MS
QRS DURATION - MUSE: 134 MS
QT - MUSE: 388 MS
QTC - MUSE: 415 MS
R AXIS - MUSE: 9 DEGREES
RBC # BLD AUTO: 3.84 10E6/UL (ref 3.8–5.2)
SODIUM SERPL-SCNC: 141 MMOL/L (ref 135–145)
SYSTOLIC BLOOD PRESSURE - MUSE: NORMAL MMHG
T AXIS - MUSE: 172 DEGREES
VENTRICULAR RATE- MUSE: 69 BPM
WBC # BLD AUTO: 8.2 10E3/UL (ref 4–11)

## 2024-02-22 PROCEDURE — 93005 ELECTROCARDIOGRAM TRACING: CPT

## 2024-02-22 PROCEDURE — 93010 ELECTROCARDIOGRAM REPORT: CPT | Performed by: STUDENT IN AN ORGANIZED HEALTH CARE EDUCATION/TRAINING PROGRAM

## 2024-02-22 PROCEDURE — 94640 AIRWAY INHALATION TREATMENT: CPT | Mod: 76

## 2024-02-22 PROCEDURE — 99233 SBSQ HOSP IP/OBS HIGH 50: CPT | Performed by: INTERNAL MEDICINE

## 2024-02-22 PROCEDURE — 94660 CPAP INITIATION&MGMT: CPT

## 2024-02-22 PROCEDURE — 85027 COMPLETE CBC AUTOMATED: CPT | Performed by: PHYSICIAN ASSISTANT

## 2024-02-22 PROCEDURE — 94640 AIRWAY INHALATION TREATMENT: CPT

## 2024-02-22 PROCEDURE — 32555 ASPIRATE PLEURA W/ IMAGING: CPT | Mod: RT | Performed by: INTERNAL MEDICINE

## 2024-02-22 PROCEDURE — 250N000013 HC RX MED GY IP 250 OP 250 PS 637: Performed by: INTERNAL MEDICINE

## 2024-02-22 PROCEDURE — 94799 UNLISTED PULMONARY SVC/PX: CPT

## 2024-02-22 PROCEDURE — 93005 ELECTROCARDIOGRAM TRACING: CPT | Performed by: PHYSICIAN ASSISTANT

## 2024-02-22 PROCEDURE — 36415 COLL VENOUS BLD VENIPUNCTURE: CPT | Performed by: PHYSICIAN ASSISTANT

## 2024-02-22 PROCEDURE — 99254 IP/OBS CNSLTJ NEW/EST MOD 60: CPT | Mod: 25 | Performed by: INTERNAL MEDICINE

## 2024-02-22 PROCEDURE — 88305 TISSUE EXAM BY PATHOLOGIST: CPT | Mod: TC | Performed by: INTERNAL MEDICINE

## 2024-02-22 PROCEDURE — 250N000009 HC RX 250: Performed by: PHYSICIAN ASSISTANT

## 2024-02-22 PROCEDURE — 250N000013 HC RX MED GY IP 250 OP 250 PS 637: Performed by: PHYSICIAN ASSISTANT

## 2024-02-22 PROCEDURE — 999N000157 HC STATISTIC RCP TIME EA 10 MIN

## 2024-02-22 PROCEDURE — 83735 ASSAY OF MAGNESIUM: CPT | Performed by: PHYSICIAN ASSISTANT

## 2024-02-22 PROCEDURE — 80048 BASIC METABOLIC PNL TOTAL CA: CPT | Performed by: PHYSICIAN ASSISTANT

## 2024-02-22 PROCEDURE — 999N000065 XR CHEST PORT 1 VIEW

## 2024-02-22 PROCEDURE — 210N000001 HC R&B IMCU HEART CARE

## 2024-02-22 PROCEDURE — 0W993ZZ DRAINAGE OF RIGHT PLEURAL CAVITY, PERCUTANEOUS APPROACH: ICD-10-PCS | Performed by: INTERNAL MEDICINE

## 2024-02-22 PROCEDURE — 99233 SBSQ HOSP IP/OBS HIGH 50: CPT | Performed by: PHYSICIAN ASSISTANT

## 2024-02-22 PROCEDURE — 71045 X-RAY EXAM CHEST 1 VIEW: CPT

## 2024-02-22 PROCEDURE — 250N000009 HC RX 250: Performed by: INTERNAL MEDICINE

## 2024-02-22 RX ORDER — ACETYLCYSTEINE 200 MG/ML
2 SOLUTION ORAL; RESPIRATORY (INHALATION) EVERY 4 HOURS
Status: DISCONTINUED | OUTPATIENT
Start: 2024-02-22 | End: 2024-02-22

## 2024-02-22 RX ADMIN — METOPROLOL TARTRATE 25 MG: 25 TABLET, FILM COATED ORAL at 08:49

## 2024-02-22 RX ADMIN — EMPAGLIFLOZIN 10 MG: 10 TABLET, FILM COATED ORAL at 08:48

## 2024-02-22 RX ADMIN — BUDESONIDE AND FORMOTEROL FUMARATE DIHYDRATE 2 PUFF: 160; 4.5 AEROSOL RESPIRATORY (INHALATION) at 08:46

## 2024-02-22 RX ADMIN — ATORVASTATIN CALCIUM 20 MG: 10 TABLET, FILM COATED ORAL at 21:59

## 2024-02-22 RX ADMIN — GABAPENTIN 600 MG: 300 CAPSULE ORAL at 19:42

## 2024-02-22 RX ADMIN — CEFDINIR 300 MG: 300 CAPSULE ORAL at 19:42

## 2024-02-22 RX ADMIN — IPRATROPIUM BROMIDE AND ALBUTEROL SULFATE 3 ML: .5; 3 SOLUTION RESPIRATORY (INHALATION) at 17:38

## 2024-02-22 RX ADMIN — SUCRALFATE 1 G: 1 TABLET ORAL at 07:01

## 2024-02-22 RX ADMIN — PANTOPRAZOLE SODIUM 40 MG: 40 TABLET, DELAYED RELEASE ORAL at 19:42

## 2024-02-22 RX ADMIN — GABAPENTIN 600 MG: 300 CAPSULE ORAL at 14:56

## 2024-02-22 RX ADMIN — SUCRALFATE 1 G: 1 TABLET ORAL at 21:59

## 2024-02-22 RX ADMIN — Medication 81 MG: at 08:48

## 2024-02-22 RX ADMIN — METOPROLOL TARTRATE 25 MG: 25 TABLET, FILM COATED ORAL at 19:44

## 2024-02-22 RX ADMIN — POTASSIUM CHLORIDE 20 MEQ: 1500 TABLET, EXTENDED RELEASE ORAL at 08:48

## 2024-02-22 RX ADMIN — ACETYLCYSTEINE 4 ML: 100 SOLUTION ORAL; RESPIRATORY (INHALATION) at 00:52

## 2024-02-22 RX ADMIN — CEFDINIR 300 MG: 300 CAPSULE ORAL at 08:49

## 2024-02-22 RX ADMIN — PANTOPRAZOLE SODIUM 40 MG: 40 TABLET, DELAYED RELEASE ORAL at 08:49

## 2024-02-22 RX ADMIN — IPRATROPIUM BROMIDE AND ALBUTEROL SULFATE 3 ML: .5; 3 SOLUTION RESPIRATORY (INHALATION) at 04:41

## 2024-02-22 RX ADMIN — ACETYLCYSTEINE 4 ML: 100 SOLUTION ORAL; RESPIRATORY (INHALATION) at 08:07

## 2024-02-22 RX ADMIN — BUDESONIDE AND FORMOTEROL FUMARATE DIHYDRATE 2 PUFF: 160; 4.5 AEROSOL RESPIRATORY (INHALATION) at 19:42

## 2024-02-22 RX ADMIN — FUROSEMIDE 10 MG: 20 TABLET ORAL at 08:49

## 2024-02-22 RX ADMIN — IPRATROPIUM BROMIDE AND ALBUTEROL SULFATE 3 ML: .5; 3 SOLUTION RESPIRATORY (INHALATION) at 08:07

## 2024-02-22 RX ADMIN — SUCRALFATE 1 G: 1 TABLET ORAL at 12:03

## 2024-02-22 RX ADMIN — IPRATROPIUM BROMIDE AND ALBUTEROL SULFATE 3 ML: .5; 3 SOLUTION RESPIRATORY (INHALATION) at 00:51

## 2024-02-22 RX ADMIN — DIGOXIN 125 MCG: 125 TABLET ORAL at 08:49

## 2024-02-22 RX ADMIN — GABAPENTIN 600 MG: 300 CAPSULE ORAL at 08:48

## 2024-02-22 RX ADMIN — SUCRALFATE 1 G: 1 TABLET ORAL at 16:42

## 2024-02-22 RX ADMIN — ACETYLCYSTEINE 4 ML: 100 SOLUTION ORAL; RESPIRATORY (INHALATION) at 04:41

## 2024-02-22 ASSESSMENT — ACTIVITIES OF DAILY LIVING (ADL)
ADLS_ACUITY_SCORE: 28
ADLS_ACUITY_SCORE: 28
ADLS_ACUITY_SCORE: 41
ADLS_ACUITY_SCORE: 43
ADLS_ACUITY_SCORE: 41
ADLS_ACUITY_SCORE: 28
ADLS_ACUITY_SCORE: 41
ADLS_ACUITY_SCORE: 43
ADLS_ACUITY_SCORE: 28
ADLS_ACUITY_SCORE: 43
ADLS_ACUITY_SCORE: 43
ADLS_ACUITY_SCORE: 28
ADLS_ACUITY_SCORE: 41

## 2024-02-22 NOTE — CONSULTS
PULMONARY MEDICINE CONSULT  2/22/2024      Admit Date: 2/20/2024  CODE: Full Code    Reason for Consult: acute on chronic respiratory failure with hypoxemia. Right pleural effusion.      Assessment/Plan:   74F w/ hx of afib on DOAC, HFpEF, emphysema, aortic stenosis, smoker, COPD (no PFTs), CAD, s/p TAVR this admission, who's had increasing O2 requirements, cough and SOB. She's had chronic right pleural effusion which was drained this admission; exudative by protein criteria and likely due to pseudoexudate in the setting of diuresis. CXR yesterday showed likely atelectasis and mucus plugging that has since improved with some pulmonary hygiene. Another thoracentesis was done today (1L clear yellow removed; total removed this admission ~2L) with continued improvement in her CXR and breathing. SpO2 improved to 97% from 93% and she was able to wean down to 3Lpm from 5Lpm. She is a daily smoker and definitely has emphysema, evidence of chronic bronchitis and very likely has COPD (though she's never had PFTs)  so she may require discharge with oxygen. She does likely have a component of CHF/volume overload and would benefit from additional diuresis. E/e' elevated suggesting her left sided filling pressures are high; could consider additional diuresis to get more volume off.     Plan:  - titrate FiO2 for goal SpO2 88-92%. Avoid hyperoxia. Home O2 eval prior to discharge  - follow up pleural fluid cytology on 2/22 pleural fluid  - stop the mucomyst nebs and volara.   - continue duonebs and flutter valve; albuterol nebs prn  - continue PTA ICS/LABA  - smoking cessation encouraged.  - diuretics/volume status management per [facility]/cardiology. Consider higher dose of loop diuretic given persistent and recurrent right pleural effusion.   - offered follow up in our pulmonary clinic with PFTs and further discussion of recurrent pleural effusions. She declines, stating she has too many doctor appointments and wants to recover from her  heart procedure note. Encouraged follow up with her PMD.    No further recommendations; we'll sign off. Please call with additional questions.    Addy (Nelson) MD Z[place]ka  [facility] Pulmonary & Critical Care  Pager [phone]  Clinic [phone]  Fax [phone]                                                                                                                                                         HPI:   CCx: acute on chronic respiratory failure with hypoxemia. Right pleural effusion.     HPI: 74F w/ hx of afib on DOAC, HFpEF, emphysema, smoker, COPD (no PFTs), CAD, s/p TAVR this admission, who's had increasing O2 requirements, cough and SOB. S/p thoracentesis for 1L clear yellow fluid removed on 2/7 with improvement in her breathing. The fluid was mildly exudative by protein criteria. She's had right sided thora's on two occasions previously as well. CXR showed rightward mediastinal shift with concern for mucus plugging for atelectasis. She was started on mucomyst and volara tx. She does not like using the mucomyst due to rotten egg smell.   Today I saw her on 4-5Lpm with SpO2 low 90s. She reported her breathing was fine. She has nonproductive cough. No hemoptysis.   I did a right thoracentesis for 1L clear yellow fluid. She felt improvement in her breathing after the procedure.                                                                                                                                                         Past Medical History:  Past Medical History:   Diagnosis Date    Anemia     Aortic stenosis     Atrial fibrillation (H)     Atrial flutter (H)     Benign neoplasm of adenomatous polyp     large intestine     Chronic constipation     Chronic pain syndrome     COPD (chronic obstructive pulmonary disease) (H)     Oxygen at night     Dependence on supplemental oxygen     Oxygen at noc, during the day as needed    Depression     Diabetes mellitus (H)      Dry eye syndrome     Fibromyalgia     Ganglion     left wrist    GERD (gastroesophageal reflux disease)     Hyperlipidemia     Hypertension     Hypokalemia     Infective otitis externa, unspecified     Created by Conversion     Larynx edema     Lung disease     Malignant neoplasm of vulva (H)     Created by Conversion [facility] Annotation: Apr 17 2007  8:24AM - LavellamadaCurtHarpal Cammy:  resection per Dr. Alfonso Mane 9/06;  Replacement Utility updated for latest IMO load    Medial epicondylitis     Onychomycosis     Osteoarthritis     Peptic ulcer     Polyneuropathy     Vulvar malignant neoplasm (H)         Past Surgical History  Past Surgical History:   Procedure Laterality Date    BIOPSY BREAST Right     BIOPSY BREAST Right 01/28/2015    BIOPSY BREAST Right 1/28/2015    Procedure: RIGHT BREAST BIOPSY AFTER WIRE LOCALIZATION AT 0940;  Surgeon: Renée Soriano MD;  Location: [facility];  Service:     BIOPSY OF BREAST, INCISIONAL      Description: Incisional Breast Biopsy;  Recorded: 11/13/2007;  Comments: benign    COLONOSCOPY N/A 6/14/2019    Procedure: COLONOSCOPY;  Surgeon: Eduardo Mora MD;  Location: [facility];  Service: Gastroenterology    CV CORONARY ANGIOGRAM N/A 2/8/2024    Procedure: CV CORONARY ANGIOGRAM;  Surgeon: Moises Valencia MD;  Location: [facility]    CV LEFT HEART CATH N/A 2/8/2024    Procedure: Left Heart Catheterization;  Surgeon: Moises Valencia MD;  Location: [facility]    CV RIGHT HEART CATH MEASUREMENTS RECORDED N/A 2/8/2024    Procedure: Right Heart Catheterization;  Surgeon: Moises Valencia MD;  Location: [facility]    CV TRANSCATHETER AORTIC VALVE REPLACEMENT-FEMORAL APPROACH N/A 2/20/2024    Procedure: Transcatheter Aortic Valve Replacement, possible cardiopulmonary bypass, possible surgical intervention;  Surgeon: Moises Valencia MD;  Location: [facility]    ESOPHAGOSCOPY, GASTROSCOPY, DUODENOSCOPY (EGD),  "COMBINED N/A 11/6/2018    Procedure: ESOPHAGOGASTRODUODENOSCOPY;  Surgeon: Lit Fernando MD;  Location: [facility] OR;  Service:     HYSTERECTOMY      JOINT REPLACEMENT Left     TKA    OR TRANSCATHETER AORTIC VALVE REPLACEMENT, FEMORAL PERCUTANEOUS APPROACH (STANDBY) N/A 2/20/2024    Procedure: OR TRANSCATHETER AORTIC VALVE REPLACEMENT, FEMORAL PERCUTANEOUS APPROACH (STANDBY);  Surgeon: Ishmael Farias MD;  Location: [facility] CATH LAB CV    PICC TRIPLE LUMEN PLACEMENT  1/12/2023         MT ABLATE HEART DYSRHYTHM FOCUS      Description: Catheter Ablation Atrial Fibrillation;  Recorded: 07/31/2012;  Comments: 7/24/12 PVI with Dr. Gardiner and nilay to all 5 pulm veins and CTI fl ablation line as well.    ZZC SUPRACERV ABD HYSTERECTOMY      Description: Supracervical Hysterectomy;  Proc Date: 01/01/1985;  Comments: some cervix left!; ovaries intact; done for bleeding       Allergies:  Allergies   Allergen Reactions    Celebrex [Celecoxib] Rash     patient had butterfly rash - \"lupus-like\"      Latex Rash       PTA medications:  Medications Prior to Admission   Medication Sig Dispense Refill Last Dose    albuterol (PROAIR HFA/PROVENTIL HFA/VENTOLIN HFA) 108 (90 Base) MCG/ACT inhaler INHALE 2 PUFFS INTO THE LUNGS EVERY 4 HOURS AS NEEDED FOR WHEEZING 13.4 g 3 Past Month at prn    apixaban ANTICOAGULANT (ELIQUIS) 5 MG tablet Take 1 tablet (5 mg) by mouth 2 times daily 180 tablet 2 Past Week at 2/18/24    atorvastatin (LIPITOR) 20 MG tablet TAKE 1 TABLET(20 MG) BY MOUTH AT BEDTIME 90 tablet 3 2/19/2024 at pm    digoxin (LANOXIN) 125 MCG tablet TAKE 1 TABLET(125 MCG) BY MOUTH DAILY 90 tablet 2 2/19/2024 at am    furosemide (LASIX) 20 MG tablet Take 0.5 tablets (10 mg) by mouth daily 30 tablet 1 2/19/2024 at am    gabapentin (NEURONTIN) 600 MG tablet TAKE 1 TABLET(600 MG) BY MOUTH THREE TIMES DAILY 270 tablet 2 2/20/2024 at am    ipratropium - albuterol 0.5 mg/2.5 mg/3 mL (DUONEB) 0.5-2.5 (3) MG/3ML neb " "solution Take 1 vial (3 mLs) by nebulization every 6 hours as needed for shortness of breath or wheezing 90 mL 0 Past Month at prn    JARDIANCE 10 MG TABS tablet TAKE 1 TABLET(10 MG) BY MOUTH DAILY 90 tablet 2 Past Week at 2/16/24 am    magnesium oxide (MAG-OX) 400 MG tablet Take 1 tablet (400 mg) by mouth daily 30 tablet 0 2/20/2024 at am    meclizine (ANTIVERT) 25 MG tablet TAKE 1 TABLET(25 MG) BY MOUTH THREE TIMES DAILY AS NEEDED FOR DIZZINESS OR NAUSEA 45 tablet 5 More than a month at prn    metoprolol tartrate (LOPRESSOR) 25 MG tablet TAKE 1 TABLET(25 MG) BY MOUTH TWICE DAILY 180 tablet 2 2/19/2024 at pm    naloxone (NARCAN) 4 MG/0.1ML nasal spray Spray 1 spray (4 mg) into one nostril alternating nostrils once as needed for opioid reversal every 2-3 minutes until assistance arrives 0.2 mL 0 Unknown at prn    nystatin (NYSTOP) 808829 UNIT/GM external powder APPLY TOPICALLY TO THE AFFECTED AREA 2-3 TIMES DAILY AS NEEDED 60 g 3 More than a month at prn    omeprazole (PRILOSEC) 20 MG DR capsule TAKE 1 CAPSULE(20 MG) BY MOUTH TWICE DAILY 180 capsule 1 2/20/2024 at am    oxyCODONE IR (ROXICODONE) 10 MG tablet Take 1 tablet (10 mg) by mouth every 6 hours as needed for moderate to severe pain 120 tablet 0 Past Week at prn    potassium chloride ER (KLOR-CON M) 20 MEQ CR tablet TAKE 1 TABLET(20 MEQ) BY MOUTH DAILY 90 tablet 3 2/19/2024 at am    sucralfate (CARAFATE) 1 GM tablet CRUSH ONE TABLET AND MIX WITH A LLITTLE WATER AND SWALLOW FOUR TIMES DAILY 360 tablet 3 2/19/2024 at pm    SYMBICORT 160-4.5 MCG/ACT Inhaler INHALE 2 PUFFS INTO THE LUNGS TWICE DAILY 10.2 g 4 2/19/2024 at pm    ACCU-CHEK SOFTCLIX LANCETS lancets [ACCU-CHEK SOFTCLIX LANCETS LANCETS] TEST THREE TIMES DAILY 300 each 3     BD ULTRA-FINE SHORT PEN NEEDLE 31 gauge x 5/16\" Ndle [BD ULTRA-FINE SHORT PEN NEEDLE 31 GAUGE X 5/16\" NDLE] TEST FOUR TIMES DAILY WITH MEALS AND AT BEDTIME 400 each 3     blood-glucose meter (ONETOUCH VERIO IQ METER) Misc " [BLOOD-GLUCOSE METER (ONETOUCH VERIO IQ METER) MISC] Check blood sugar three times a day. 1 each 0     diaper,brief,adult,disposable (ADULT BRIEFS - LARGE) Misc [DIAPER,BRIEF,ADULT,DISPOSABLE (ADULT BRIEFS - LARGE) MISC] Use 3-4 daily as needed for incontinence 120 each 6     generic lancets (FINGERSTIX LANCETS) [GENERIC LANCETS (FINGERSTIX LANCETS)] Dispense brand per patient's insurance at pharmacy discretion. 300 each 0     Nutritional Supplements (ENSURE COMPLETE) LIQD Take 1 Can by mouth daily 7110 mL 4     ONETOUCH VERIO IQ test strip TEST THREE TIMES DAILY 300 strip 0        Family Hx:  Family History   Problem Relation Age of Onset    Heart Failure Mother     Cancer Other         paternal HX-laryngeal     Alcoholism Sister     No Known Problems Daughter     No Known Problems Maternal Grandmother     No Known Problems Maternal Grandfather     No Known Problems Paternal Grandmother     No Known Problems Paternal Grandfather     No Known Problems Maternal Aunt     No Known Problems Paternal Aunt     Alcoholism Sister     Alcoholism Brother     Alcoholism Father     Cancer Paternal Uncle         Gastric-Alcohol    Cancer Paternal Uncle         gastric-Alcohol    Hereditary Breast and Ovarian Cancer Syndrome No family hx of     Breast Cancer No family hx of     Colon Cancer No family hx of     Endometrial Cancer No family hx of     Ovarian Cancer No family hx of        Social Hx:  Social History     Socioeconomic History    Marital status:      Spouse name: Not on file    Number of children: Not on file    Years of education: Not on file    Highest education level: Not on file   Occupational History    Not on file   Tobacco Use    Smoking status: Some Days     Packs/day: .25     Types: Cigarettes     Passive exposure: Never    Smokeless tobacco: Never    Tobacco comments:     seen by TTS inpatient on 3/31/22   Vaping Use    Vaping Use: Never used   Substance and Sexual Activity    Alcohol use: Yes      "Comment: Alcoholic Drinks/day: very little    Drug use: No    Sexual activity: Not on file   Other Topics Concern    Not on file   Social History Narrative    Not on file     Social Determinants of Health     Financial Resource Strain: Low Risk  (12/26/2023)    Financial Resource Strain     Within the past 12 months, have you or your family members you live with been unable to get utilities (heat, electricity) when it was really needed?: No   Food Insecurity: Low Risk  (12/26/2023)    Food Insecurity     Within the past 12 months, did you worry that your food would run out before you got money to buy more?: No     Within the past 12 months, did the food you bought just not last and you didn t have money to get more?: No   Transportation Needs: Low Risk  (12/26/2023)    Transportation Needs     Within the past 12 months, has lack of transportation kept you from medical appointments, getting your medicines, non-medical meetings or appointments, work, or from getting things that you need?: No   Physical Activity: Not on file   Stress: Not on file   Social Connections: Not on file   Interpersonal Safety: Low Risk  (9/20/2023)    Interpersonal Safety     Do you feel physically and emotionally safe where you currently live?: Yes     Within the past 12 months, have you been hit, slapped, kicked or otherwise physically hurt by someone?: No     Within the past 12 months, have you been humiliated or emotionally abused in other ways by your partner or ex-partner?: No   Housing Stability: Low Risk  (12/26/2023)    Housing Stability     Do you have housing? : Yes     Are you worried about losing your housing?: No       Exam/Data:     Vitals  /51 (BP Location: Right arm)   Pulse 63   Temp 98.1  F (36.7  C) (Oral)   Resp 16   Ht 1.651 m (5' 5\")   Wt 60.6 kg (133 lb 8 oz)   LMP  (LMP Unknown)   SpO2 93%   BMI 22.22 kg/m       I/O last 3 completed shifts:  In: 1130 [P.O.:1130]  Out: 1200 [Urine:1200]  Weight change: " "0.408 kg (14.4 oz)  [unfilled]  EXAM:  /51 (BP Location: Right arm)   Pulse 63   Temp 98.1  F (36.7  C) (Oral)   Resp 16   Ht 1.651 m (5' 5\")   Wt 60.6 kg (133 lb 8 oz)   LMP  (LMP Unknown)   SpO2 93%   BMI 22.22 kg/m      Intake/Output last 3 shifts:  I/O last 3 completed shifts:  In: 1130 [P.O.:1130]  Out: 1200 [Urine:1200]  Intake/Output this shift:  No intake/output data recorded.    Physical Exam  Gen: awake, alert, oriented, no distress  HEENT: NT, no AUGUST  CV: RRR, no m/g/r  Resp: diminished on the right. No wheezing.   Abd: soft, nontender, BS+  Skin: no rashes or lesions  Ext: no edema  Neuro: PERRL, nonfocal exam    ROS: A complete 10-system review of systems was obtained and is negative with the exception of what is noted in the history of present illness.    Medications:      aspirin  81 mg Oral Daily    atorvastatin  20 mg Oral At Bedtime    budesonide-formoterol  2 puff Inhalation BID    cefdinir  300 mg Oral Q12H DOMENICA (08/20)    digoxin  125 mcg Oral Daily    empagliflozin  10 mg Oral Daily    furosemide  10 mg Oral Daily    gabapentin  600 mg Oral TID    insulin aspart  1-10 Units Subcutaneous TID AC    insulin aspart  1-7 Units Subcutaneous At Bedtime    ipratropium - albuterol 0.5 mg/2.5 mg/3 mL  3 mL Nebulization Q4H    metoprolol tartrate  25 mg Oral BID    nicotine  1 patch Transdermal Daily    nicotine   Transdermal Q8H    pantoprazole  40 mg Oral BID    potassium chloride john ER  20 mEq Oral Daily    sucralfate  1 g Oral 4x Daily AC & HS         DATA  All laboratory and radiology has been personally reviewed by myself today.  Recent Labs   Lab 02/22/24  0454   WBC 8.2   HGB 11.6*   HCT 37.7   *     Recent Labs   Lab 02/22/24  0454 02/21/24  0432 02/20/24  0911 02/19/24  1340    142 141 142   CO2 32* 31* 30* 31*   BUN 13.1 18.5 13.7 14.1   ALKPHOS  --   --  92 100   ALT  --   --  11 15   AST  --   --  18 18     Pleural fluid 2/7  Total nuc cells 987, 11% PMN, 65% " lymphs, 18% mono/mac, 2% eos   (serum 280)  Protein 4.3 (serum 6.5)  Cytology neg for malignancy  Culture negative    PFT DATA   None available      IMAGING:   Personally reviewed the CT chest and CXR's

## 2024-02-22 NOTE — PROCEDURES
THORACENTESIS PROCEDURE NOTE  (NON-OR)    PATIENT NAME:    Mary Kay Tejada,  1950,  MRN# [id number]      Procedure Date: 2/22/2024   Performing Physician: Addy Gardiner MD    Procedure: ultrasound-guided right thoracentesis  Pre-Procedure Diagnosis: right pleural effusion  Post-Procedure Diagnosis: same as pre-procedure diagnosis    Indications: right pleural effusion    Estimated Blood Loss: minimal    Complications: none immediate  Specimen: 1000 mL clear yellow pleural fluid    Procedure Details/Findings:   The risks, benefits, potential complications, treatment options, and expected outcomes were discussed with the patient. Discussed that the risks and potential complications include but are not limited to infection, bleeding, pain, pneumothorax, and death.  The patient concurred with the proposed plan, giving informed consent. The site of the procedure was properly noted/marked. The patient was identified as Mary Kay Tejada with date of birth 1950 and the procedure verified as right thoracentesis. A time out was held and the above information confirmed.    The patient was properly positioned. The procedure was performed using sterile precautions, including chlorhexidine at the procedure site, cap, mask with face shield, sterile gown, and sterile gloves, a sterile drape, and a sterile ultrasound probe cover. Under ultrasound guidance, 10 mL of 1% lidocaine was infiltrated into the subcutaneous tissue and over the rib at the right posterior axillary line. A small skin nick was made with a scalpel. A thoracentesis catheter was introduced over a thoracentesis needle with clear yellow return. The needle was removed and manual suction device used to obtain 1000 mL of clear yellow pleural fluid. The catheter was then removed and a dressing was applied to the wound. The procedure then concluded.    Condition: stable  Post-procedural US showed complete resolution of the right pleural effusion without any  evidence of complications.  SpO2 improved from 93% from 97%.  ________________________________________________________________________  Addy Gardiner MD  2/22/20242:02 PM

## 2024-02-22 NOTE — PLAN OF CARE
Problem: Adult Inpatient Plan of Care  Goal: Plan of Care Review  Description: The Plan of Care Review/Shift note should be completed every shift.  The Outcome Evaluation is a brief statement about your assessment that the patient is improving, declining, or no change.  This information will be displayed automatically on your shift  note.  Outcome: Progressing     Problem: Adult Inpatient Plan of Care  Goal: Absence of Hospital-Acquired Illness or Injury  Intervention: Prevent Infection  Recent Flowsheet Documentation  Taken 2/21/2024 2141 by Mariam Raymundo RN  Infection Prevention:   hand hygiene promoted   rest/sleep promoted  Taken 2/21/2024 1732 by Mariam Raymundo RN  Infection Prevention:   hand hygiene promoted   rest/sleep promoted     Problem: Risk for Delirium  Goal: Optimal Coping  Intervention: Optimize Psychosocial Adjustment to Delirium  Recent Flowsheet Documentation  Taken 2/21/2024 2141 by Mariam Raymundo RN  Supportive Measures: active listening utilized  Taken 2/21/2024 1732 by Mariam Raymundo RN  Supportive Measures: active listening utilized  Goal: Improved Behavioral Control  Intervention: Minimize Safety Risk  Recent Flowsheet Documentation  Taken 2/21/2024 2141 by Mariam Raymundo RN  Communication Enhancement Strategies: call light answered in person  Taken 2/21/2024 1732 by Mariam Raymundo RN  Communication Enhancement Strategies: call light answered in person  Goal: Improved Attention and Thought Clarity  Intervention: Maximize Cognitive Function  Recent Flowsheet Documentation  Taken 2/21/2024 2141 by Mariam Raymundo RN  Reorientation Measures:   calendar in view   clock in view  Taken 2/21/2024 1732 by Mariam Raymundo RN  Reorientation Measures:   calendar in view   clock in view  Goal: Improved Sleep  Outcome: Progressing     Problem: Cardiac Catheterization (Diagnostic/Interventional)  Goal: Absence of Bleeding  Outcome: Progressing     Problem: Pain Acute  Goal: Optimal Pain Control and  Function  Intervention: Prevent or Manage Pain  Recent Flowsheet Documentation  Taken 2/21/2024 2141 by Mariam Raymundo RN  Medication Review/Management: medications reviewed  Taken 2/21/2024 1732 by Mariam Raymundo RN  Medication Review/Management: medications reviewed  Intervention: Optimize Psychosocial Wellbeing  Recent Flowsheet Documentation  Taken 2/21/2024 2141 by Mariam Raymundo RN  Supportive Measures: active listening utilized  Taken 2/21/2024 1732 by Mariam Raymundo RN  Supportive Measures: active listening utilized   Goal Outcome Evaluation:                      Alert, oriented.    Denied sob. On O2 at 3 lpm nc. Ronchi upper lobes. Has loose, mostly nonproductive cough.    Left lower extremity woc, dressing clean dry and intact.    Standby assist to assist of 1 for safety. Falls precaution and interventions maintained.
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
## 2024-02-22 NOTE — PLAN OF CARE
Assumed care 2300 to 0730. A&O x 4. Stand by assist with a walker. Tele is sinus w/ 1st AVB. Denies pain. Lung sounds are diminished with rhonchi in the upper lobes. Up to toilet. Call light within reach, able to make needs known. Bed alarm on for safety.    Problem: Infection  Goal: Absence of Infection Signs and Symptoms  Outcome: Progressing     Problem: Risk for Delirium  Goal: Improved Behavioral Control  Intervention: Minimize Safety Risk  Recent Flowsheet Documentation  Taken 2/22/2024 0430 by Lillian Coyne, RN  Communication Enhancement Strategies: call light answered in person  Taken 2/22/2024 0045 by Lillian Coyne, RN  Communication Enhancement Strategies: call light answered in person

## 2024-02-22 NOTE — PLAN OF CARE
"Goal Outcome Evaluation:    S/P TAVR  Pt alert and oriented. Denied chest pain.  SOB and CONTE noted.  On 3 lpm O2 via NC. Desating when sleeping, increased to 5 lpm.  Sats maintaing 89-92%.  Up at the edge of bed for meals.  Call light within reach, make needs known.  SBA while ambulating.  Lung tap done by pulmo; sample sent to lab for testing.  Plan is to discharge tomorrow with monitor.      Problem: Adult Inpatient Plan of Care  Goal: Plan of Care Review  Description: The Plan of Care Review/Shift note should be completed every shift.  The Outcome Evaluation is a brief statement about your assessment that the patient is improving, declining, or no change.  This information will be displayed automatically on your shift  note.  2/22/2024 1425 by Jose Macias RN  Outcome: Progressing  2/22/2024 1425 by Jose Macias RN  Outcome: Progressing  Goal: Patient-Specific Goal (Individualized)  Description: You can add care plan individualizations to a care plan. Examples of Individualization might be:  \"Parent requests to be called daily at 9am for status\", \"I have a hard time hearing out of my right ear\", or \"Do not touch me to wake me up as it startles  me\".  2/22/2024 1425 by Jose Macias RN  Outcome: Progressing  2/22/2024 1425 by Jose Macias, RN  Outcome: Progressing  Goal: Absence of Hospital-Acquired Illness or Injury  2/22/2024 1425 by Jose Macias, RN  Outcome: Progressing  2/22/2024 1425 by Jose Macias RN  Outcome: Progressing  Intervention: Identify and Manage Fall Risk  Recent Flowsheet Documentation  Taken 2/22/2024 0800 by Jose Macias, RN  Safety Promotion/Fall Prevention:   assistive device/personal items within reach   increase visualization of patient   lighting adjusted   nonskid shoes/slippers when out of bed  Goal: Optimal Comfort and Wellbeing  2/22/2024 1425 by Jose Macias, RN  Outcome: Progressing  2/22/2024 1425 by Jose Macias, RN  Outcome: Progressing  Goal: " Readiness for Transition of Care  2/22/2024 1425 by Jose Macias, RN  Outcome: Progressing  2/22/2024 1425 by Jose Macias, RN  Outcome: Progressing  Intervention: Mutually Develop Transition Plan  Recent Flowsheet Documentation  Taken 2/22/2024 0052 by Jose Macias, RN  Equipment Currently Used at Home:   shower chair   walker, rolling

## 2024-02-23 ENCOUNTER — APPOINTMENT (OUTPATIENT)
Dept: CARDIOLOGY | Facility: HOSPITAL | Age: 74
DRG: 266 | End: 2024-02-23
Attending: PHYSICIAN ASSISTANT
Payer: COMMERCIAL

## 2024-02-23 VITALS
TEMPERATURE: 98.2 F | HEIGHT: 65 IN | OXYGEN SATURATION: 96 % | DIASTOLIC BLOOD PRESSURE: 52 MMHG | HEART RATE: 69 BPM | WEIGHT: 131.4 LBS | BODY MASS INDEX: 21.89 KG/M2 | RESPIRATION RATE: 16 BRPM | SYSTOLIC BLOOD PRESSURE: 100 MMHG

## 2024-02-23 LAB
ANION GAP SERPL CALCULATED.3IONS-SCNC: 2 MMOL/L (ref 7–15)
ATRIAL RATE - MUSE: 64 BPM
BUN SERPL-MCNC: 12.8 MG/DL (ref 8–23)
CALCIUM SERPL-MCNC: 9.3 MG/DL (ref 8.8–10.2)
CHLORIDE SERPL-SCNC: 105 MMOL/L (ref 98–107)
CREAT SERPL-MCNC: 0.66 MG/DL (ref 0.51–0.95)
DEPRECATED HCO3 PLAS-SCNC: 35 MMOL/L (ref 22–29)
DIASTOLIC BLOOD PRESSURE - MUSE: NORMAL MMHG
EGFRCR SERPLBLD CKD-EPI 2021: >90 ML/MIN/1.73M2
ERYTHROCYTE [DISTWIDTH] IN BLOOD BY AUTOMATED COUNT: 14.6 % (ref 10–15)
GLUCOSE BLDC GLUCOMTR-MCNC: 114 MG/DL (ref 70–99)
GLUCOSE SERPL-MCNC: 106 MG/DL (ref 70–99)
HCT VFR BLD AUTO: 40.1 % (ref 35–47)
HGB BLD-MCNC: 12.5 G/DL (ref 11.7–15.7)
INTERPRETATION ECG - MUSE: NORMAL
MAGNESIUM SERPL-MCNC: 2 MG/DL (ref 1.7–2.3)
MCH RBC QN AUTO: 30.3 PG (ref 26.5–33)
MCHC RBC AUTO-ENTMCNC: 31.2 G/DL (ref 31.5–36.5)
MCV RBC AUTO: 97 FL (ref 78–100)
P AXIS - MUSE: 66 DEGREES
PLATELET # BLD AUTO: 141 10E3/UL (ref 150–450)
POTASSIUM SERPL-SCNC: 4 MMOL/L (ref 3.4–5.3)
PR INTERVAL - MUSE: 238 MS
QRS DURATION - MUSE: 136 MS
QT - MUSE: 394 MS
QTC - MUSE: 406 MS
R AXIS - MUSE: -6 DEGREES
RBC # BLD AUTO: 4.12 10E6/UL (ref 3.8–5.2)
SODIUM SERPL-SCNC: 142 MMOL/L (ref 135–145)
SYSTOLIC BLOOD PRESSURE - MUSE: NORMAL MMHG
T AXIS - MUSE: 145 DEGREES
VENTRICULAR RATE- MUSE: 64 BPM
WBC # BLD AUTO: 6.3 10E3/UL (ref 4–11)

## 2024-02-23 PROCEDURE — 250N000013 HC RX MED GY IP 250 OP 250 PS 637: Performed by: INTERNAL MEDICINE

## 2024-02-23 PROCEDURE — 93010 ELECTROCARDIOGRAM REPORT: CPT | Performed by: GENERAL ACUTE CARE HOSPITAL

## 2024-02-23 PROCEDURE — 250N000013 HC RX MED GY IP 250 OP 250 PS 637: Performed by: PHYSICIAN ASSISTANT

## 2024-02-23 PROCEDURE — 80048 BASIC METABOLIC PNL TOTAL CA: CPT | Performed by: PHYSICIAN ASSISTANT

## 2024-02-23 PROCEDURE — 94799 UNLISTED PULMONARY SVC/PX: CPT

## 2024-02-23 PROCEDURE — 999N000096 CARDIAC MOBILE TELEMETRY MONITOR

## 2024-02-23 PROCEDURE — 99232 SBSQ HOSP IP/OBS MODERATE 35: CPT | Performed by: EMERGENCY MEDICINE

## 2024-02-23 PROCEDURE — 93005 ELECTROCARDIOGRAM TRACING: CPT

## 2024-02-23 PROCEDURE — 85027 COMPLETE CBC AUTOMATED: CPT | Performed by: PHYSICIAN ASSISTANT

## 2024-02-23 PROCEDURE — 99233 SBSQ HOSP IP/OBS HIGH 50: CPT | Performed by: PHYSICIAN ASSISTANT

## 2024-02-23 PROCEDURE — 36415 COLL VENOUS BLD VENIPUNCTURE: CPT | Performed by: PHYSICIAN ASSISTANT

## 2024-02-23 PROCEDURE — 94640 AIRWAY INHALATION TREATMENT: CPT | Mod: 76

## 2024-02-23 PROCEDURE — 94640 AIRWAY INHALATION TREATMENT: CPT

## 2024-02-23 PROCEDURE — 250N000009 HC RX 250: Performed by: INTERNAL MEDICINE

## 2024-02-23 PROCEDURE — 83735 ASSAY OF MAGNESIUM: CPT | Performed by: PHYSICIAN ASSISTANT

## 2024-02-23 RX ORDER — FUROSEMIDE 20 MG
20 TABLET ORAL DAILY
Qty: 30 TABLET | Refills: 1 | Status: SHIPPED | OUTPATIENT
Start: 2024-02-23 | End: 2024-04-29

## 2024-02-23 RX ORDER — CEFDINIR 300 MG/1
300 CAPSULE ORAL EVERY 12 HOURS
Qty: 1 CAPSULE | Refills: 0 | Status: SHIPPED | OUTPATIENT
Start: 2024-02-23 | End: 2024-03-03

## 2024-02-23 RX ADMIN — PANTOPRAZOLE SODIUM 40 MG: 40 TABLET, DELAYED RELEASE ORAL at 08:15

## 2024-02-23 RX ADMIN — METOPROLOL TARTRATE 25 MG: 25 TABLET, FILM COATED ORAL at 08:15

## 2024-02-23 RX ADMIN — IPRATROPIUM BROMIDE AND ALBUTEROL SULFATE 3 ML: .5; 3 SOLUTION RESPIRATORY (INHALATION) at 07:30

## 2024-02-23 RX ADMIN — SUCRALFATE 1 G: 1 TABLET ORAL at 11:49

## 2024-02-23 RX ADMIN — CEFDINIR 300 MG: 300 CAPSULE ORAL at 08:15

## 2024-02-23 RX ADMIN — FUROSEMIDE 10 MG: 20 TABLET ORAL at 08:14

## 2024-02-23 RX ADMIN — IPRATROPIUM BROMIDE AND ALBUTEROL SULFATE 3 ML: .5; 3 SOLUTION RESPIRATORY (INHALATION) at 11:14

## 2024-02-23 RX ADMIN — DIGOXIN 125 MCG: 125 TABLET ORAL at 08:15

## 2024-02-23 RX ADMIN — SUCRALFATE 1 G: 1 TABLET ORAL at 06:35

## 2024-02-23 RX ADMIN — GABAPENTIN 600 MG: 300 CAPSULE ORAL at 08:14

## 2024-02-23 RX ADMIN — EMPAGLIFLOZIN 10 MG: 10 TABLET, FILM COATED ORAL at 08:14

## 2024-02-23 RX ADMIN — Medication 81 MG: at 08:15

## 2024-02-23 RX ADMIN — POTASSIUM CHLORIDE 20 MEQ: 1500 TABLET, EXTENDED RELEASE ORAL at 08:18

## 2024-02-23 RX ADMIN — BUDESONIDE AND FORMOTEROL FUMARATE DIHYDRATE 2 PUFF: 160; 4.5 AEROSOL RESPIRATORY (INHALATION) at 08:14

## 2024-02-23 ASSESSMENT — ACTIVITIES OF DAILY LIVING (ADL)
ADLS_ACUITY_SCORE: 28
ADLS_ACUITY_SCORE: 28
ADLS_ACUITY_SCORE: 26
ADLS_ACUITY_SCORE: 28
ADLS_ACUITY_SCORE: 26
ADLS_ACUITY_SCORE: 28
ADLS_ACUITY_SCORE: 26
ADLS_ACUITY_SCORE: 26

## 2024-02-23 NOTE — PLAN OF CARE
"Goal Outcome Evaluation:    Pt is alert and oriented.  Vitals checked and WNL.  On 3 lpm O2 via NC. Crackles on RL lung.  Refused bed alarm. Independent in room with steady gait.  Call light within reach, make needs known.        Problem: Adult Inpatient Plan of Care  Goal: Plan of Care Review  Description: The Plan of Care Review/Shift note should be completed every shift.  The Outcome Evaluation is a brief statement about your assessment that the patient is improving, declining, or no change.  This information will be displayed automatically on your shift  note.  2/22/2024 2020 by Jose Macias RN  Outcome: Progressing  2/22/2024 1425 by Jose Macias RN  Outcome: Progressing  2/22/2024 1425 by Jose Macias RN  Outcome: Progressing  Goal: Patient-Specific Goal (Individualized)  Description: You can add care plan individualizations to a care plan. Examples of Individualization might be:  \"Parent requests to be called daily at 9am for status\", \"I have a hard time hearing out of my right ear\", or \"Do not touch me to wake me up as it startles  me\".  2/22/2024 2020 by Jose Macias RN  Outcome: Progressing  2/22/2024 1425 by Jose Macias RN  Outcome: Progressing  2/22/2024 1425 by Jose Macias RN  Outcome: Progressing  Goal: Absence of Hospital-Acquired Illness or Injury  2/22/2024 2020 by Jose Macias, RN  Outcome: Progressing  2/22/2024 1425 by Jose Macias RN  Outcome: Progressing  2/22/2024 1425 by Jose Macias RN  Outcome: Progressing  Intervention: Identify and Manage Fall Risk  Recent Flowsheet Documentation  Taken 2/22/2024 1600 by Jose Macias RN  Safety Promotion/Fall Prevention:   assistive device/personal items within reach   increase visualization of patient   lighting adjusted   nonskid shoes/slippers when out of bed  Taken 2/22/2024 0800 by Jose Macias RN  Safety Promotion/Fall Prevention:   assistive device/personal items within reach   increase visualization of " patient   lighting adjusted   nonskid shoes/slippers when out of bed  Goal: Optimal Comfort and Wellbeing  2/22/2024 2020 by Jose Macias, RN  Outcome: Progressing  2/22/2024 1425 by Jose Macias, RN  Outcome: Progressing  2/22/2024 1425 by Jose Macias, RN  Outcome: Progressing  Goal: Readiness for Transition of Care  2/22/2024 2020 by Jose Macias, RN  Outcome: Progressing  2/22/2024 1425 by Jose Macias, RN  Outcome: Progressing  2/22/2024 1425 by Jose Macias, RN  Outcome: Progressing  Intervention: Mutually Develop Transition Plan  Recent Flowsheet Documentation  Taken 2/22/2024 5370 by Jose Macias, RN  Equipment Currently Used at Home:   shower chair   walker, rolling

## 2024-02-23 NOTE — PROGRESS NOTES
[facility]    Medicine Progress Note - Hospitalist Service    Date of Admission:  2/20/2024    Assessment & Plan   74-year-old female with PMH of paroxysmal A-fib s/p PVI in 2012 on Eliquis, chronic HFpEF, hypertension, DM2, mild to moderate nonobstructive CAD, COPD, chronic respiratory failure on 3 L home O2, ongoing tobacco use, fibromyalgia and chronic pain who underwent TAVR for severe aortic stenosis.  Postprocedure course significant for left lower extremity cellulitis and acute on chronic hypoxia secondary to right-sided pleural effusion s/p thoracentesis      Acute on chronic respiratory failure with hypoxia.  Increased O2 needs this morning.  Chest x-ray with mild to moderate right pleural effusion and concern for retained secretions.  Consulted pulmonology.  S/p bedside thoracentesis with removal of 1 L fluid.  Follow-up cytology report.  Goal SpO2 88%-92%.  Continue DuoNebs, albuterol nebs, and flutter valve.  Continue PTA ICS/LABA  Hemorrhagic blister and possible left lower extremity cellulitis.  No fever, leukocytosis or CRP elevation.  Patient reports improvement in erythema since it first appeared couple of weeks ago.  Complete oral Omnicef 3-day course per ID recommendations.  Continue local wound care per WOC RN   Tobacco use.  Patient has no plans to quit   DM2 without long-term insulin use.  Well-controlled as outpatient, hemoglobin A1c 6.2.  Continue Jardiance.  Monitor blood sugars with meals and at bedtime  Essential hypertension.  Well-controlled on current medication regimen  Chronic HFpEF.  Appears euvolemic.  Continue PTA diuretics  Paroxysmal A-fib s/p PVI in 2012.  Currently in sinus rhythm.  Continue metoprolol and digoxin for ventricular rate control.  Resume Eliquis per cardiology  Mild to moderate nonobstructive CAD.  No recent angina  Severe aortic stenosis s/p TAVR.  Continue management per cardiology          Diet: Low Saturated Fat Na <2400 mg    DVT  Prophylaxis: Aspirin followed by Eliquis - per Cardiology   Chairez Catheter: Not present  Lines: None     Cardiac Monitoring: ACTIVE order. Indication: Transcatheter structural interventions (24 hours)  Code Status: Full Code      Clinically Significant Risk Factors              # Hypoalbuminemia: Lowest albumin = 3 g/dL at 2/20/2024  9:11 AM, will monitor as appropriate     # Hypertension: Noted on problem list            # Financial/Environmental Concerns: none         Disposition Plan     Expected Discharge Date: 02/22/2024      Destination: home with family;home with help/services              Nemo Mulligan MD  Hospitalist Service  [facility]  Securely message with [employer] (more info)  Text page via [employer] Paging/Directory   ______________________________________________________________________    Interval History   Patient reports feeling well however nurse noticed increased dyspnea and hypoxia 84 to 85% on 3 L O2 still increased to 5 LPM.  She continues to have productive cough  No complaints about her left leg    Physical Exam   Vital Signs: Temp: 97.5  F (36.4  C) Temp src: Oral BP: 99/50 Pulse: 66   Resp: 16 SpO2: 91 % O2 Device: Nasal cannula Oxygen Delivery: 3 LPM  Weight: 133 lbs 8 oz    General Appearance: No acute distress  Respiratory: Respirations unlabored, diminished on the right, no rhonchi or wheezing  Cardiovascular: Regular rate and rhythm.  Normal S1-S2  Skin: Left lower extremity lesion is wrapped with Kerlix, no visible edema or erythema  Other: Awake, alert, grossly nonfocal    Medical Decision Making       50 MINUTES SPENT BY ME on the date of service doing chart review, history, exam, documentation & further activities per the note.  MANAGEMENT DISCUSSED with the following over the past 24 hours: Patient, nursing staff, cardiology       Data     I have personally reviewed the following data over the past 24 hrs:    8.2  \   11.6 (L)   / 143 (L)     141 105 13.1  /  104 (H)   4.3 32 (H) 0.66 \       Imaging results reviewed over the past 24 hrs:   Recent Results (from the past 24 hour(s))   XR Chest Port 1 View    Narrative    EXAM: XR CHEST PORT 1 VIEW  LOCATION: [facility]  DATE: 2/22/2024    INDICATION: Follow up on right lung secretions collapse.  COMPARISON: 02/21/2024      Impression    IMPRESSION: Little change of small to moderate right pleural effusion and associated basilar atelectasis. Possible minimal left pleural effusion. No pneumothorax. Stable cardiac silhouette. TAVR.   XR Chest Port 1 View    Narrative    EXAM: XR CHEST PORT 1 VIEW  LOCATION: [facility]  DATE: 2/22/2024    INDICATION: Status post right thoracentesis. Eval for interval change and rule out PTX  COMPARISON: 02/22/2024.      Impression    IMPRESSION: Since earlier on 02/22/2024, interval right thoracentesis. Minimal residual right pleural fluid. No postprocedural pneumothorax. Remainder not significantly changed. Bibasilar atelectasis. TAVR. Advanced right glenohumeral joint arthritis.

## 2024-02-23 NOTE — PROGRESS NOTES
Care Management Follow Up    Length of Stay (days): 3    Expected Discharge Date: 02/23/2024     Concerns to be Addressed: discharge planning     Patient plan of care discussed at interdisciplinary rounds: Yes    Anticipated Discharge Disposition: Home     Anticipated Discharge Services: PCA (resume)  Anticipated Discharge DME: None    Patient/family educated on Medicare website which has current facility and service quality ratings:    Education Provided on the Discharge Plan: Yes (per care team)  Patient/Family in Agreement with the Plan: yes    Referrals Placed by CM/SW:  NA  Private pay costs discussed: Not applicable    Additional Information:  Patient discharging home, will resume PCA services and home oxygen.   No CM needs identified.     Aleta Sheth RN

## 2024-02-23 NOTE — PROGRESS NOTES
Daily Progress Note    Assessment/Plan:  74-year-old female with PMH of paroxysmal A-fib s/p PVI in 2012 on Eliquis, chronic HFpEF, hypertension, DM2, mild to moderate nonobstructive CAD, COPD, chronic respiratory failure on 3 L home O2, ongoing tobacco use, fibromyalgia and chronic pain who underwent TAVR for severe aortic stenosis.  Postprocedure course significant for left lower extremity cellulitis and acute on chronic hypoxia secondary to right-sided pleural effusion s/p thoracentesis      Acute on chronic respiratory failure with hypoxia secondary to right pleural effusion.  Resolved..    Consulted pulmonology.  Seen by pulmonology and s/p bedside thoracentesis with removal of 1 L fluid.  Follow-up cytology report.  Goal SpO2 88%-92%.   Continue PTA ICS/LABA  Hemorrhagic blister and possible left lower extremity cellulitis.  No fever, leukocytosis or CRP elevation.  Patient reports improvement in erythema since it first appeared couple of weeks ago.  Complete oral Omnicef 3-day course per ID recommendations.  Continue local wound care per WOC RN   Tobacco use.  Patient has no plans to quit   DM2 without long-term insulin use.  Well-controlled as outpatient, hemoglobin A1c 6.2.  Continue Jardiance.  Monitor blood sugars with meals and at bedtime  Essential hypertension.  Well-controlled on current medication regimen  Chronic HFpEF.  Appears euvolemic.  Continue PTA diuretics  Paroxysmal A-fib s/p PVI in 2012.  Currently in sinus rhythm.  Continue metoprolol and digoxin for ventricular rate control.  Resume Eliquis per cardiology  Mild to moderate nonobstructive CAD.  No recent angina  Severe aortic stenosis s/p TAVR.  Continue management per cardiology    Code status:Full Code        Barriers to Discharge: No current barriers from HMS standpoint        Subjective:  Betsy is new to me today, chart reviewed and discussed with staff.  Overall she feels much improved.  Eating and drinking well, denies any new  dyspnea.  She tolerated her thoracentesis yesterday.  No fevers or chills or nausea or vomiting or chest pain.  Her  Terrell is present        Current Medications Reviewed via EHR List    Objective:  Vital signs in last 24 hours:  [unfilled]  .prog  Weight:   @THISENCWEIGHTS(1)@  Weight change: -0.953 kg (-2 lb 1.6 oz)  Body mass index is 21.87 kg/m .    Intake/Output last 3 shifts:  I/O last 3 completed shifts:  In: 1240 [P.O.:1240]  Out: 500 [Urine:500]  Intake/Output this shift:  I/O this shift:  In: -   Out: 550 [Urine:550]    Physical Exam:  General: No apparent distress, lying in bed  CV: Regular rate and rhythm  Lungs: Decreased breath sounds bilaterally but clear and equal  Abdomen:        Imaging:  Personally Reviewed.  XR Chest Port 1 View    Result Date: 2/22/2024  EXAM: XR CHEST PORT 1 VIEW LOCATION: [facility] DATE: 2/22/2024 INDICATION: Status post right thoracentesis. Eval for interval change and rule out PTX COMPARISON: 02/22/2024.     IMPRESSION: Since earlier on 02/22/2024, interval right thoracentesis. Minimal residual right pleural fluid. No postprocedural pneumothorax. Remainder not significantly changed. Bibasilar atelectasis. TAVR. Advanced right glenohumeral joint arthritis.    XR Chest Port 1 View    Result Date: 2/22/2024  EXAM: XR CHEST PORT 1 VIEW LOCATION: [facility] DATE: 2/22/2024 INDICATION: Follow up on right lung secretions collapse. COMPARISON: 02/21/2024     IMPRESSION: Little change of small to moderate right pleural effusion and associated basilar atelectasis. Possible minimal left pleural effusion. No pneumothorax. Stable cardiac silhouette. TAVR.    XR Chest Port 1 View    Result Date: 2/21/2024  EXAM: XR CHEST PORT 1 VIEW LOCATION: [facility] DATE: 2/21/2024 INDICATION: Chronic respiratory failure with A. fib. COMPARISON: Film earlier today and multiple prior studies, CTA chest 02/06/2024      IMPRESSION: Prior TAVR. Volume loss with shift of the mediastinum into the right chest with small effusion is unchanged since film earlier today. Lung has collapsed even more, post thoracentesis. This is presumed to be due to retained secretions. Query need for bronchoscopy/pulmonary consult? Left lung is clear. No signs of failure. Advanced degenerative changes in the right shoulder with periarticular, ossified loose bodies.     Echocardiogram Complete    Result Date: 2024  [id number]^MIKE^[place] [address]  Name: ALVAREZ HINDS MRN: [id number] : 1950 Study Date: 2024 07:38 AM Age: 74 yrs Gender: Female Patient Location: [facility] Reason For Study: Aortic Valve Replacement Ordering Physician: LUCINDA MCKEON Referring Physician: YAMILA BRINK Performed By: ROLA  BSA: 1.7 m2 Height: 65 in Weight: 132 lb HR: 77 BP: 95/55 mmHg ______________________________________________________________________________ Procedure Complete Echo Adult. Definity (NDC #88638-016) given intravenously. ______________________________________________________________________________ Interpretation Summary  1. Technically difficult study (imaging could not be obtained from the parasternal window). 2. Normal left ventricular size and systolic performance with a visually estimated ejection fraction of 60%. 3. There is a bio-prosthetic aortic valve (documented 26 mm Douglas Mai 3 Resilia tissue valve). Â  Normal aortic valve prosthesis metrics with a mean systolic gradient of 4 mmHg and a peak anterograde velocity of 1.5 m/sec. The Ao Acceleration Time is 0.14 sec. Â  No aortic insufficiency is detected. 4. Probable normal right ventricular size and systolic performance though right-sided structures are not clearly visualized on all views on this study. ______________________________________________________________________________ Left  ventricle: Normal left ventricular size and systolic performance with a visually estimated ejection fraction of 60%. There is normal regional wall motion. Left ventricular wall thickness is normal.  Assessment of LV Diastolic Function: The cumulative findings are indeterminate in the evaluation of diastolic function.  Right ventricle: Probable normal right ventricular size and systolic performance though right- sided structures are not clearly visualized on all views on this study.  Left atrium: There is borderline left atrial enlargement.  Right atrium: The right atrium is of normal size.  IVC: The IVC is of normal caliber.  Aortic valve: There is a bio-prosthetic aortic valve (documented 26 mm Douglas Mai 3 Resilia tissue valve). Normal aortic valve prosthesis metrics with a mean systolic gradient of 4 mmHg and a peak anterograde velocity of 1.5 m/sec. The Ao Acceleration Time is 0.14 sec. No aortic insufficiency is detected.  Mitral valve: The mitral valve appears morphologically normal. There is trace-mild mitral insufficiency.  Tricuspid valve: The tricuspid valve is grossly morphologically normal. There is trace tricuspid insufficiency.  Pulmonic valve: The pulmonic valve is grossly morphologically normal.  Thoracic aorta: The aortic root and proximal ascending aorta are of normal dimension.  Pericardium: There is no significant pericardial effusion. ______________________________________________________________________________ ______________________________________________________________________________ MMode/2D Measurements & Calculations LVOT diam: 2.3 cm LVOT area: 4.2 cm2 TAPSE: 1.9 cm  Doppler Measurements & Calculations MV E max cuca: 139.0 cm/sec MV A max cuca: 95.4 cm/sec MV E/A: 1.5 MV max P.5 mmHg MV mean PG: 3.0 mmHg MV V2 VTI: 35.6 cm MVA(VTI): 2.9 cm2 MV dec slope: 667.0 cm/sec2 MV dec time: 0.21 sec Ao V2 max: 154.0 cm/sec Ao max P.0 mmHg Ao V2 mean: 95.8 cm/sec Ao mean P.0 mmHg Ao  V2 VTI: 29.0 cm ADALBERTO(I,D): 3.5 cm2 ADALBERTO(V,D): 3.3 cm2 Ao acc time: 0.14 sec LV V1 max P.2 mmHg LV V1 max: 124.0 cm/sec LV V1 VTI: 24.6 cm SV(LVOT): 102.2 ml SI(LVOT): 61.6 ml/m2 PA V2 max: 151.0 cm/sec PA max P.1 mmHg PA acc time: 0.06 sec AV Raji Ratio (DI): 0.81 ADALBERTO Index (cm2/m2): 2.1 E/E': 21.4  E/E' av.1 Lateral E/e': 14.9 Medial E/e': 21.3 Peak E' Raji: 6.5 cm/sec RV S Raji: 15.9 cm/sec  ______________________________________________________________________________ Report approved by: Diamante Anders 2024 09:48 AM       XR Chest Port 1 View    Result Date: 2024  EXAM: XR CHEST PORT 1 VIEW LOCATION: [facility] DATE: 2024 INDICATION: S P Transcatheter Aortic Valve Replacement COMPARISON: CT pulmonary angiogram 2024     IMPRESSION: New valvular prosthesis in aortic position. New shift of the heart and mediastinal structures to the light indicating right lung volume loss. Bronchial cut off the bronchus intermedius consistent with secretions or debris within central infrahilar right lung airways and postobstructive atelectasis. There is a meniscus in the lateral sulcus consistent with a small right pleural effusion. No pneumothorax. Compensatory hyperinflation of the left lung.    Cardiac Catheterization    Result Date: 2024  Procedure: Transcatheter aortic valve replacement Indication: Severe symptomatic aortic stenosis with the patient felt to be an appropriate candidate for transcatheter aortic valve replacement after a thorough multidisciplinary approach, including a visit with a surgeon. Operators: Interventional Cardiology: Moises Valencia MD, Jamel Colvin MD CT Surgery: Ishmael Farias MD Echocardiography: Transthoracic Approach: R transfemoral Anesthesia: MAC Procedure Details: Informed consent obtained before the patient was brought to the procedure room.  Appropriate timeout was performed before the procedure was started. Access  was obtained in the left femoral artery and vein using the modified Seldinger technique, and then in the right femoral artery under direct fluoroscopic visualization. Access site was pre-measured for Manta closure device.  A L femoral vein was used to place a temporary pacemaker in the RV. A pigtail catheter was placed at the aortic annulus via the left femoral artery to determine the coplanar angle. After appropriate anticoagulation, the Douglas E sheath was placed through the right femoral arteriotomy with the help of a stiff guidewire.  The aortic valve was then crossed and a Safari 2 wire was placed at the LV apex. A 26mm Mai 3 Resilia valve was then successfully deployed in the aortic position under rapid pacing.  Post deployment to the patient had good hemodynamics, with trace aortic insufficiency and a mean gradient of 3 mmHg. Relative aortic/venticular implant ratio was 90/10. The delivery system and sheath were then removed, with successful hemostasis of the arteriotomy by deployment of the Manta closure device. The left femoral artery and venous sheaths were removed as well with successful hemostasis via the Angio-Seal device. Conclusions: 1. Severe symptomatic aortic stenosis status post successful transcatheter aortic valve replacement with a 26mm Mai 3 valve, delivered via the right transfemoral approach. 2.  Trace aortic insufficiency post deployment with a mean gradient of 3 mmHg. Postprocedure patient status: Patient planned to be transferred to the PACU/recovery for ongoing management.  Temporary pacemaker removed and need to be re-assessed in the next 24 hours based on clinical need. Moises Valencia MD Interventional Cardiology     Echocardiogram Complete    Result Date: 2024  [id number]^KEENA^[place] [address]  Name: ALVAREZ HINDS MRN: [id number] : 1950 Study Date: 2024 06:50 AM Age: 74 yrs  Gender: Female Patient Location: [facility] Reason For Study: Severe aortic stenosis Ordering Physician: YAMILA BRINK Referring Physician: YAMILA BRINK Performed By: MICA  BSA: 1.6 m2 Height: 65 in Weight: 130 lb HR: 83 ______________________________________________________________________________ Procedure Complete Echo Adult. ______________________________________________________________________________ Interpretation Summary  Pre-TAVR: Left Ventricle: The estimated left ventricular ejection fraction is 55-60%. Right Ventricle: Normal size and systolic function. Aortic Valve: There is severe global calcification with reduced excursion of the aortic valve present. Severe aortic stenosis (mean gradient: 16 mmHg. Peak cuca: 2.7 m/sec). No aortic regurgitation. Mitral Valve: No mitral stenosis present. Trace regurgitation. Pericardium: No pericardial effusion.  POST-TAVR: Left Ventricle: The estimated left ventricular ejection fraction is 55-60%. No acute wall motion abnormalities. Right Ventricle: Normal size and systolic function. Aortic Valve: There is well seated 26 mm Douglas Mai 3 Resilia bioprosthetic valve in aortic position with normal gradient (mean gradient 3 mmHg, peak velocity: 1.0 m/sec). No paravalvular leak. Normal bioprosthetic leaflet motion. Mitral Valve: No mitral stenosis present. Trace regurgitation. No acute change in mitral function Pericardium: No pericardial effusion. ______________________________________________________________________________ Left Ventricle The left ventricle is normal in size. There is normal left ventricular wall thickness. Left ventricular systolic function is normal. The visual ejection fraction is 55-60%. No regional wall motion abnormalities noted.  Right Ventricle The right ventricle is not well visualized. The right ventricle is mildly dilated. The right ventricular systolic function is normal.  Atria The left atrium is not well visualized. Right atrium not  well visualized.  Mitral Valve The mitral valve leaflets are mildly thickened. There is trace mitral regurgitation. There is no mitral valve stenosis.  Tricuspid Valve The tricuspid valve is not well visualized.  Aortic Valve The aortic valve is not well visualized. Severe aortic valve calcification is present. No aortic regurgitation is present. Severe valvular aortic stenosis.  Pulmonic Valve The pulmonic valve is not well visualized.  Vessels The aorta root is normal.  Pericardium There is no pericardial effusion.  Rhythm Sinus rhythm was noted. ______________________________________________________________________________ MMode/2D Measurements & Calculations  IVSd: 0.90 cm LVIDd: 4.1 cm LVIDs: 1.2 cm LVPWd: 0.90 cm FS: 70.7 % LV mass(C)d: 114.1 grams LV mass(C)dI: 69.3 grams/m2 Ao root diam: 3.1 cm LVOT diam: 2.1 cm LVOT area: 3.5 cm2 Ao root diam index Ht(cm/m): 1.9 Ao root diam index BSA (cm/m2): 1.9 RWT: 0.44  Doppler Measurements & Calculations Ao V2 max: 108.0 cm/sec Ao max P.7 mmHg Ao V2 mean: 77.7 cm/sec Ao mean PG: 3.0 mmHg Ao V2 VTI: 20.5 cm ADALBERTO(I,D): 2.6 cm2 ADALBERTO(V,D): 2.1 cm2 LV V1 max P.7 mmHg LV V1 max: 65.3 cm/sec LV V1 VTI: 15.6 cm SV(LVOT): 54.0 ml SI(LVOT): 32.8 ml/m2 AV Raji Ratio (DI): 0.60 ADALBERTO Index (cm2/m2): 1.6  ______________________________________________________________________________ Report approved by: Diamante Keene 2024 01:22 PM       Cardiac Catheterization    Addendum Date: 2024    Mary Kay Tejada is a 74 year old old female with severe aortic stenosis, AF/AFL, some CAD on CTA, HL, COPD, anemia who is here for LHC/RHC in the setting of admission for ADHF, respiratory failure that improved after thoracentesis. 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), low-normal CO/CI by Ronit - continue ASA 81mg daily indefinitely, clopidogrel 600mg once, followed by 75mg daily for at least 12  months - proceed w/ TAVR w/up as planned - continue aggressive risk factor modification Findings: LM:no obstruction LAD:mid-vessel 40% narrowing, D1 w/ 40% ostial narrowing Lcx:mildly irregular RCA:dominant, mildly irregular LVEDP:16 AV gradient (mean) by pullback): 23 RHC: RA:7 RV:55/4 PA:58/18 (33) (28) PCWP:unable to wedge SvO2:66.5 CO/CI: Ronit:4.43/2.15 Access: R Radial artery R brachial vein switched to R Femoral vein Closure: Vasc Band Manual     Result Date: 2/8/2024  Images from the original result were not included. Mary Kay Tejada is a 74 year old old female with severe aortic stenosis, AF/AFL, some CAD on CTA, HL, COPD, anemia who is here for LHC/RHC in the setting of admission for ADHF, respiratory failure that improved after thoracentesis. 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), normal CO/CI by Ronit - continue ASA 81mg daily indefinitely, clopidogrel 600mg once, followed by 75mg daily for at least 12 months - proceed w/ TAVR w/up as planned - continue aggressive risk factor modification Findings: LM:no obstruction LAD:mid-vessel 40% narrowing, D1 w/ 40% ostial narrowing Lcx:mildly irregular RCA:dominant, mildly irregular LVEDP:16 AV gradient (mean) by pullback): 23 RHC: RA:7 RV:55/4 PA:58/18 (33) (28) PCWP:unable to wedge SvO2:66.5 CO/CI: Ronit:4.43/2.15 Access: R Radial artery R brachial vein switched to R Femoral vein Closure: Vasc Band Manual     US Thoracentesis    Result Date: 2/7/2024  EXAM: 1. RIGHT THORACENTESIS 2. ULTRASOUND GUIDANCE LOCATION: [facility] DATE: 2/7/2024 INDICATION: Pleural effusion. PROCEDURE: Informed consent obtained. Time out performed. The chest was prepped and draped in sterile fashion. 10 mL of 1 % lidocaine was infused into the local soft tissues. Under direct ultrasound guidance, a 5 Liberian catheter system was placed into the pleural effusion. 1.05 liters of  clear yellow fluid were removed and sent to lab, if requested. Patient tolerated procedure well. Ultrasound imaging was obtained and placed in the patient's permanent medical record.     IMPRESSION: Status post right ultrasound-guided thoracentesis. Reference CPT Code: 71639    CT Chest Pulmonary Embolism w Contrast    Result Date: 2/6/2024  EXAM: CT CHEST PULMONARY EMBOLISM W CONTRAST, CT ABDOMEN PELVIS W CONTRAST LOCATION: [facility] DATE: 2/6/2024 INDICATION: abd pain, nausea COMPARISON: CT angiogram TAVR 05/26/2023 TECHNIQUE: CT angiogram chest abdomen pelvis during arterial phase of injection of IV contrast. 2D and 3D MIP reconstructions were performed by the CT technologist. Dose reduction techniques were used. CONTRAST: 64ml isovue 370 FINDINGS: CT ANGIOGRAM CHEST, ABDOMEN, AND PELVIS: No pulmonary embolus. Atherosclerotic calcifications of the aortic arch and descending thoracic aorta. No evidence of thoracic aortic dissection or aneurysm. Atherosclerotic calcifications of the aortoiliac vessels without evidence of aneurysmal dilatation. LUNGS AND PLEURA: A previously noted 2.4 x 2.0 cm apparent rounded nodule of the lateral segment of the right middle lobe is smaller on today's examination measuring 1.5 x 1.3 cm (series 5 image 159). Centrilobular emphysema and chronic bronchial wall thickening. Moderate to large volume right pleural effusion is similar as compared to the prior with associated areas of compressive atelectasis. Previously noted trace left pleural effusion has resolved. MEDIASTINUM/AXILLAE: No lymphadenopathy or pericardial effusion. CORONARY ARTERY CALCIFICATION: Mild to moderate. HEPATOBILIARY: Probable mild hepatic steatosis. Scattered benign hepatic cysts which require no dedicated follow-up. Several tiny calcified hepatic granulomas. Adenomyomatosis of the gallbladder fundus, otherwise the gallbladder is unremarkable. PANCREAS: Atrophic pancreas with scattered  small calcifications, findings consistent with chronic pancreatitis. No acute peripancreatic inflammation. SPLEEN: Normal. ADRENAL GLANDS: Similar bilateral adrenal hyperplasia left greater than right. KIDNEYS/BLADDER: Tiny renal cysts which require no dedicated follow-up. No hydronephrosis. Mild thickening of the urinary bladder. BOWEL: No bowel obstruction. LYMPH NODES: Normal. PELVIC ORGANS: Hysterectomy. MUSCULOSKELETAL: Severe multilevel degenerative changes of the thoracic and lumbosacral spine. Osteopenia. No acute osseous abnormality.     IMPRESSION: 1.  No pulmonary artery embolus. 2.  Moderate to large volume right pleural effusion is similar as compared to the prior with associated areas of compressive atelectasis. Previously noted trace left pleural effusion has resolved. 3.  A previously noted 2.4 x 2.0 cm apparent rounded nodule of the lateral segment of the right middle lobe is smaller on today's examination measuring 1.5 x 1.3 cm. This may represent an evolving area of rounded atelectasis however continued attention on followup imaging is recommended. 4.  Probable mild hepatic steatosis. 5.  Adenomyomatosis of the gallbladder fundus, otherwise the gallbladder is unremarkable. 6.  Atrophic pancreas with scattered small calcifications, findings consistent with chronic pancreatitis. No acute peripancreatic inflammation. 7.  Mild thickening of the urinary bladder, cystitis is possible. Recommend correlation with urinalysis.     CT Abdomen Pelvis w Contrast    Result Date: 2/6/2024  EXAM: CT CHEST PULMONARY EMBOLISM W CONTRAST, CT ABDOMEN PELVIS W CONTRAST LOCATION: [facility] DATE: 2/6/2024 INDICATION: abd pain, nausea COMPARISON: CT angiogram TAVR 05/26/2023 TECHNIQUE: CT angiogram chest abdomen pelvis during arterial phase of injection of IV contrast. 2D and 3D MIP reconstructions were performed by the CT technologist. Dose reduction techniques were used. CONTRAST: 64ml isovue  370 FINDINGS: CT ANGIOGRAM CHEST, ABDOMEN, AND PELVIS: No pulmonary embolus. Atherosclerotic calcifications of the aortic arch and descending thoracic aorta. No evidence of thoracic aortic dissection or aneurysm. Atherosclerotic calcifications of the aortoiliac vessels without evidence of aneurysmal dilatation. LUNGS AND PLEURA: A previously noted 2.4 x 2.0 cm apparent rounded nodule of the lateral segment of the right middle lobe is smaller on today's examination measuring 1.5 x 1.3 cm (series 5 image 159). Centrilobular emphysema and chronic bronchial wall thickening. Moderate to large volume right pleural effusion is similar as compared to the prior with associated areas of compressive atelectasis. Previously noted trace left pleural effusion has resolved. MEDIASTINUM/AXILLAE: No lymphadenopathy or pericardial effusion. CORONARY ARTERY CALCIFICATION: Mild to moderate. HEPATOBILIARY: Probable mild hepatic steatosis. Scattered benign hepatic cysts which require no dedicated follow-up. Several tiny calcified hepatic granulomas. Adenomyomatosis of the gallbladder fundus, otherwise the gallbladder is unremarkable. PANCREAS: Atrophic pancreas with scattered small calcifications, findings consistent with chronic pancreatitis. No acute peripancreatic inflammation. SPLEEN: Normal. ADRENAL GLANDS: Similar bilateral adrenal hyperplasia left greater than right. KIDNEYS/BLADDER: Tiny renal cysts which require no dedicated follow-up. No hydronephrosis. Mild thickening of the urinary bladder. BOWEL: No bowel obstruction. LYMPH NODES: Normal. PELVIC ORGANS: Hysterectomy. MUSCULOSKELETAL: Severe multilevel degenerative changes of the thoracic and lumbosacral spine. Osteopenia. No acute osseous abnormality.     IMPRESSION: 1.  No pulmonary artery embolus. 2.  Moderate to large volume right pleural effusion is similar as compared to the prior with associated areas of compressive atelectasis. Previously noted trace left pleural  "effusion has resolved. 3.  A previously noted 2.4 x 2.0 cm apparent rounded nodule of the lateral segment of the right middle lobe is smaller on today's examination measuring 1.5 x 1.3 cm. This may represent an evolving area of rounded atelectasis however continued attention on followup imaging is recommended. 4.  Probable mild hepatic steatosis. 5.  Adenomyomatosis of the gallbladder fundus, otherwise the gallbladder is unremarkable. 6.  Atrophic pancreas with scattered small calcifications, findings consistent with chronic pancreatitis. No acute peripancreatic inflammation. 7.  Mild thickening of the urinary bladder, cystitis is possible. Recommend correlation with urinalysis.     Head CT w/o contrast    Result Date: 2/6/2024  EXAM: CT HEAD W/O CONTRAST LOCATION: [facility] DATE: 2/6/2024 INDICATION: Slurred speech. COMPARISON: None. TECHNIQUE: Routine CT Head without IV contrast. Multiplanar reformats. Dose reduction techniques were used. FINDINGS: INTRACRANIAL CONTENTS: No intracranial hemorrhage, extraaxial collection, or mass effect.  No CT evidence of acute infarct. Mild presumed chronic small vessel ischemic changes. 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 Mild generalized volume loss. No hydrocephalus.  Focal prominence at the left MCA bifurcation measuring 5 mm. VISUALIZED ORBITS/SINUSES/MASTOIDS: No intraorbital abnormality. No paranasal sinus mucosal disease. No middle ear or mastoid effusion. BONES/SOFT TISSUES: No acute abnormality.     IMPRESSION: 1.  No CT evidence for acute intracranial process. 2.  Brain atrophy and presumed chronic microvascular ischemic changes. 3.  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 Recommend follow-up MR or CT angiogram to exclude intracranial aneurysm.      Lab Results:  Personally Reviewed.   Fingerstick Blood Glucose: @[id number](POCGLUFGR:10)@    Last Hbg A1C: No results found for: \"HGBA1C\"   Lab Results   Component Value Date    INR " 1.04 02/19/2024    INR 1.34 (H) 02/08/2023    INR 1.35 (H) 01/18/2023     Recent Results (from the past 24 hour(s))   Glucose by meter    Collection Time: 02/22/24  4:27 PM   Result Value Ref Range    GLUCOSE BY METER POCT 104 (H) 70 - 99 mg/dL   Glucose by meter    Collection Time: 02/22/24  9:21 PM   Result Value Ref Range    GLUCOSE BY METER POCT 120 (H) 70 - 99 mg/dL   CBC with platelets    Collection Time: 02/23/24  4:46 AM   Result Value Ref Range    WBC Count 6.3 4.0 - 11.0 10e3/uL    RBC Count 4.12 3.80 - 5.20 10e6/uL    Hemoglobin 12.5 11.7 - 15.7 g/dL    Hematocrit 40.1 35.0 - 47.0 %    MCV 97 78 - 100 fL    MCH 30.3 26.5 - 33.0 pg    MCHC 31.2 (L) 31.5 - 36.5 g/dL    RDW 14.6 10.0 - 15.0 %    Platelet Count 141 (L) 150 - 450 10e3/uL   Basic metabolic panel    Collection Time: 02/23/24  4:46 AM   Result Value Ref Range    Sodium 142 135 - 145 mmol/L    Potassium 4.0 3.4 - 5.3 mmol/L    Chloride 105 98 - 107 mmol/L    Carbon Dioxide (CO2) 35 (H) 22 - 29 mmol/L    Anion Gap 2 (L) 7 - 15 mmol/L    Urea Nitrogen 12.8 8.0 - 23.0 mg/dL    Creatinine 0.66 0.51 - 0.95 mg/dL    GFR Estimate >90 >60 mL/min/1.73m2    Calcium 9.3 8.8 - 10.2 mg/dL    Glucose 106 (H) 70 - 99 mg/dL   Magnesium    Collection Time: 02/23/24  4:46 AM   Result Value Ref Range    Magnesium 2.0 1.7 - 2.3 mg/dL   ECG with 12 lead and MUSE [LHE]    Collection Time: 02/23/24  6:00 AM   Result Value Ref Range    Systolic Blood Pressure  mmHg    Diastolic Blood Pressure  mmHg    Ventricular Rate 64 BPM    Atrial Rate 64 BPM    MS Interval 238 ms    QRS Duration 136 ms     ms    QTc 406 ms    P Axis 66 degrees    R AXIS -6 degrees    T Axis 145 degrees    Interpretation ECG       Sinus rhythm with 1st degree A-V block  Left bundle branch block  Abnormal ECG  When compared with ECG of 22-FEB-2024 06:53,  No significant change was found  Confirmed by SHANDRA CLEVELAND MD LOC:WW ([id number]) on 2/23/2024 7:59:54 AM     Glucose by meter    Collection Time:  02/23/24  7:33 AM   Result Value Ref Range    GLUCOSE BY METER POCT 114 (H) 70 - 99 mg/dL           Advanced Care Planning    Medical Decision Making       25 MINUTES SPENT BY ME on the date of service doing chart review, history, exam, documentation & further activities per the note.      Ganesh Braun MD  Date: 2/23/2024  Time: 12:53 PM  [facility]  Family Medicine

## 2024-02-23 NOTE — PROGRESS NOTES
Pt dressing on LLE changed, discharge instructions provided, questions encouraged and answered. Pt will leave with  to home after cardiac event monitor is placed.

## 2024-02-23 NOTE — PLAN OF CARE
Pt is alert and oriented x4, can make needs known, independent. Pt was having congested cough this morning with left side sounding coarse, lung sounds improved after duoneb and inhaler this morning. Pt currently on 3L NC with sats in the mid 90s. Pt denies pain besides some soreness at the thoracentesis site on her right side.     Walked in hallway with gaitbelt, walker, standby assist. Pt wanted to be off O2 when walking, O2 tank brought with and pulseox to assess O2 with activity. Sats dropped to 85% when off of O2, back up to 90-91% when put back on 3L NC. Pt stated that she will typically be off O2 at home but puts O2 on when she gets short of breath, needs to sit and rest, or when she goes to bed.

## 2024-02-23 NOTE — PROGRESS NOTES
Patient did not receive midnight and 4am nebs. Patient asked to not be woken up for nebs and will call if needed.

## 2024-02-23 NOTE — PLAN OF CARE
Problem: Gas Exchange Impaired  Goal: Optimal Gas Exchange  Outcome: Progressing  Intervention: Optimize Oxygenation and Ventilation  Recent Flowsheet Documentation  Taken 2/23/2024 0430 by Roseline Judd RN  Head of Bed ([facility]) Positioning: HOB at 20-30 degrees  Taken 2/22/2024 2333 by Roseline Judd RN  Head of Bed ([facility]) Positioning: HOB at 20-30 degrees   Goal Outcome Evaluation:       Pt is alert & oriented x4. On 1.5-3lmp O2 NC NOC. Sat 88-92. Lungs sound diminished. Vitals stable. Tele: NSR. Pt denies pain but a little discomfort present at thoracentesis site. Site WNL. Pt denies SOB, numbness, & tingling. No acute changes NOC. Refused bed alarm. Used call light appropriately. Pt is able to make needs known. Call light is within reach.

## 2024-02-24 LAB — GLUCOSE BLDC GLUCOMTR-MCNC: 103 MG/DL (ref 70–99)

## 2024-02-24 NOTE — DISCHARGE SUMMARY
[facility]  CARDIOLOGY DISCHARGE SUMMARY     Primary Care Physician: Cammy Bui  Admission Date: 2/20/2024   Discharge Provider: Margy Rea PA-C Discharge Date: 2/23/2024   Diet: resume previous home diet   Code Status: Prior   Activity: No lifting > 10 lb, no driving for 10 days        Condition at Discharge: Stable      BRIEF HPI:   Mary Kay Tejada is a 74 year old female 74-year-old female with a past medical history significant for aortic stenosis, chronic diastolic heart failure, hypertension, paroxysmal atrial fibrillation, coronary artery disease, chronic obstructive pulmonary disease (on 3 L of O2), type 2 diabetes mellitus, osteoarthritis, fibromyalgia/chronic pain, GERD, recurrent pleural effusions.     She has had moderate aortic stenosis and has been surveillance with routine echoes for years.  Her dyspnea on exertion has worsened over the past year.  She was found to have progression of her aortic valve stenosis to see severe in June 2023 at admission for acute decompensated heart failure.  She was referred to valve clinic and evaluated by multidisciplinary team and deemed a good candidate for transcatheter aortic valve replacement. She is POD3 from TAVR. Her course has been complicated by acute on chronic respiratory failure. She underwent thoracentesis for right pleural effusion yesterday with 1 L of fluid removed.        PRINCIPAL & ACTIVE DISCHARGE DIAGNOSES     Principal Problem:    Aortic stenosis, severe  Active Problems:    S/P TAVR (transcatheter aortic valve replacement)      SIGNIFICANT FINDINGS (Imaging, labs):   ECHO 2/21/2024  Interpretation Summary     1. Technically difficult study (imaging could not be obtained from the  parasternal window).  2. Normal left ventricular size and systolic performance with a visually  estimated ejection fraction of 60%.  3. There is a bio-prosthetic aortic valve (documented 26 mm Douglas Mai  3  Resilia tissue valve).  Â  Normal aortic valve prosthesis metrics with a mean systolic gradient of 4  mmHg and a peak anterograde velocity of 1.5 m/sec. The Ao Acceleration Time is  0.14 sec.  Â  No aortic insufficiency is detected.  4. Probable normal right ventricular size and systolic performance though  right-sided structures are not clearly visualized on all views on this study.    CXR (personally reviewed and interpreted):  EXAM: XR CHEST PORT 1 VIEW  LOCATION: [facility]  DATE: 2/22/2024     INDICATION: Status post right thoracentesis. Eval for interval change and rule out PTX  COMPARISON: 02/22/2024.                                                                      IMPRESSION: Since earlier on 02/22/2024, interval right thoracentesis. Minimal residual right pleural fluid. No postprocedural pneumothorax. Remainder not significantly changed. Bibasilar atelectasis. TAVR. Advanced right glenohumeral joint arthritis.      EKG (personally reviewed and interpreted):  Sinus rhythm with 1st degree AVB, LBBB  Ventricular rate 64, , , QT/Qtc 394/406    LABS or IMAGING REQUIRING FOLLOW-UP AFTER DISCHARGE:     Echocardiogram on 3/21/2024 at 12:45 pm    PROCEDURES ( this hospitalization only)      Procedure(s):  Transcatheter Aortic Valve Replacement, possible cardiopulmonary bypass, possible surgical intervention  OR TRANSCATHETER AORTIC VALVE REPLACEMENT, FEMORAL PERCUTANEOUS APPROACH (STANDBY)    RECOMMENDATIONS TO OUTPATIENT PROVIDER FOR F/U VISIT     With Mirian Lynn NP on 2/29/2024 at 12:50 pm    With Margy Rea PA-C for 1 month visit on 3/28/2024 at 2:10 pm    DISPOSITION     Home    HOSPITAL COURSE BY DIAGNOSIS:      #Severe aortic stenosis, now s/p TAVR using a  26 mm Douglas CHRISTELLE 3 Ultra RESILIA   #Chronic diastolic heart failure, NYHA Class III - currently compensated. Her LVEDP postprocedure was 10  Her course has been complicated by acute on chronic  respiratory distress s/p right thoracentesis with 1 L drained. She denies worsening shortness of breath today. She denies chest pain, dizziness, or lightheadedness. She has a good appetite and is voiding without issue. She does note some right sided pain at the thoracentesis site.  Sheath sites are soft without hematoma.  Stitch was removed without difficulty POD1.      She did have some QRS widening on her EKG. She had one 2-second sinus pause on telemetry during her hospitalization. Her telemetry was otherwise without pauses or evidence of high-degree AV block     - will stop ASA given restarting Eliquis  - OP Cardiac rehab  - Daily weights  - Lifelong antibiotic prophylaxis prior to all dental procedures.  - follow ups have been arranged as listed above  - trial increase in Lasix to 20 mg daily, BMP next week in clinic    #CAD  Pre-TAVR coronary angiogram showed mild to moderate nonobstructive CAD   - continue PTA Lipitor    #HTN  BP has been well-controlled since procedure  - Continue PTA Metoprolol tartrate 25 mg twice daily    #Paroxysmal atrial fibrillation  - in sinus rhythm  - resume Eliquis tonight (2/23)  - continue PTA Metoprolol tartrate 25 mg twice daily, digoxin 125 mcg    #LBBB, 1st degree AVB  - had some QRS widening after procedure  - discharge home with MCOT     #Recurrent right pleural effusion   - s/p thoracentesis  with 1 L drained   - follow-up cytology     #Chronic obstructive pulmonary disease  - not interested in smoking cessation at this time  - continue with home O2  - continue PTA inhalers    #Left leg wound   - Omnicef per ID- one more dose tonight  - continue with wound care     #DM2  - continue diabetic regimen    Clinically Significant Risk Factors              # Hypoalbuminemia: Lowest albumin = 3 g/dL at 2/20/2024  9:11 AM, will monitor as appropriate     # Hypertension: Noted on problem list            # Financial/Environmental Concerns: none                Discharge Medications  "with Med changes:        Review of your medicines        START taking        Dose / Directions   cefdinir 300 MG capsule  Commonly known as: OMNICEF  Indication: Abscess  Used for: Open wound      Dose: 300 mg  Take 1 capsule (300 mg) by mouth every 12 hours  Quantity: 1 capsule  Refills: 0            CONTINUE these medicines which may have CHANGED, or have new prescriptions. If we are uncertain of the size of tablets/capsules you have at home, strength may be listed as something that might have changed.        Dose / Directions   furosemide 20 MG tablet  Commonly known as: LASIX  This may have changed: how much to take  Used for: Acute on chronic diastolic heart failure (H)      Dose: 20 mg  Take 1 tablet (20 mg) by mouth daily  Quantity: 30 tablet  Refills: 1            CONTINUE these medicines which have NOT CHANGED        Dose / Directions   Adult Brief Large Misc  Used for: Mixed stress and urge urinary incontinence      [DIAPER,BRIEF,ADULT,DISPOSABLE (ADULT BRIEFS - LARGE) MISC] Use 3-4 daily as needed for incontinence  Quantity: 120 each  Refills: 6     albuterol 108 (90 Base) MCG/ACT inhaler  Commonly known as: PROAIR HFA/PROVENTIL HFA/VENTOLIN HFA  Used for: Chronic obstructive pulmonary disease with acute lower respiratory infection (H)      Dose: 2 puff  INHALE 2 PUFFS INTO THE LUNGS EVERY 4 HOURS AS NEEDED FOR WHEEZING  Quantity: 13.4 g  Refills: 3     apixaban ANTICOAGULANT 5 MG tablet  Commonly known as: ELIQUIS  Used for: Atrial fibrillation, unspecified type (H)      Dose: 5 mg  Take 1 tablet (5 mg) by mouth 2 times daily  Quantity: 180 tablet  Refills: 2     atorvastatin 20 MG tablet  Commonly known as: LIPITOR  Used for: Mixed hyperlipidemia      TAKE 1 TABLET(20 MG) BY MOUTH AT BEDTIME  Quantity: 90 tablet  Refills: 3     B-D U/F 31G X 8 MM insulin pen needle  Used for: DM (diabetes mellitus) (H)  Generic drug: insulin pen needle      [BD ULTRA-FINE SHORT PEN NEEDLE 31 GAUGE X 5/16\" NDLE] TEST " FOUR TIMES DAILY WITH MEALS AND AT BEDTIME  Quantity: 400 each  Refills: 3     blood glucose monitoring lancets  Used for: Type 2 diabetes mellitus with hyperglycemia (H)      [ACCU-CHEK SOFTCLIX LANCETS LANCETS] TEST THREE TIMES DAILY  Quantity: 300 each  Refills: 3     digoxin 125 MCG tablet  Commonly known as: LANOXIN  Used for: Acute on chronic diastolic heart failure (H)      TAKE 1 TABLET(125 MCG) BY MOUTH DAILY  Quantity: 90 tablet  Refills: 2     Ensure Complete Liqd  Used for: Severe protein-calorie malnutrition (H24)      Dose: 1 Can  Take 1 Can by mouth daily  Quantity: 7110 mL  Refills: 4     gabapentin 600 MG tablet  Commonly known as: NEURONTIN  Used for: Type 2 diabetes mellitus with hypoglycemia without coma, with long-term current use of insulin (H)      Dose: 600 mg  TAKE 1 TABLET(600 MG) BY MOUTH THREE TIMES DAILY  Quantity: 270 tablet  Refills: 2     * GLUCOSE METER TEST VI  Used for: Type 2 diabetes mellitus with hypoglycemia without coma, without long-term current use of insulin (H)      [BLOOD-GLUCOSE METER (ONETOUCH VERIO IQ METER) MISC] Check blood sugar three times a day.  Quantity: 1 each  Refills: 0     * ONETOUCH VERIO IQ test strip  Used for: Type 2 diabetes mellitus with hypoglycemia without coma, without long-term current use of insulin (H)  Generic drug: blood glucose      TEST THREE TIMES DAILY  Quantity: 300 strip  Refills: 0     ipratropium - albuterol 0.5 mg/2.5 mg/3 mL 0.5-2.5 (3) MG/3ML neb solution  Commonly known as: DUONEB      Dose: 3 mL  Take 1 vial (3 mLs) by nebulization every 6 hours as needed for shortness of breath or wheezing  Quantity: 90 mL  Refills: 0     Jardiance 10 MG Tabs tablet  Used for: Acute on chronic diastolic heart failure (H), Type 2 diabetes mellitus with hypoglycemia without coma, without long-term current use of insulin (H)  Generic drug: empagliflozin      TAKE 1 TABLET(10 MG) BY MOUTH DAILY  Quantity: 90 tablet  Refills: 2     magnesium oxide 400  MG tablet  Commonly known as: MAG-OX  Used for: Atrial fibrillation, unspecified type (H)      Dose: 400 mg  Take 1 tablet (400 mg) by mouth daily  Quantity: 30 tablet  Refills: 0     meclizine 25 MG tablet  Commonly known as: ANTIVERT  Used for: Vertigo      TAKE 1 TABLET(25 MG) BY MOUTH THREE TIMES DAILY AS NEEDED FOR DIZZINESS OR NAUSEA  Quantity: 45 tablet  Refills: 5     metoprolol tartrate 25 MG tablet  Commonly known as: LOPRESSOR  Used for: Paroxysmal atrial fibrillation (H)      TAKE 1 TABLET(25 MG) BY MOUTH TWICE DAILY  Quantity: 180 tablet  Refills: 2     naloxone 4 MG/0.1ML nasal spray  Commonly known as: NARCAN  Used for: Trigger point of thoracic region, Chronic pain syndrome      Dose: 4 mg  Spray 1 spray (4 mg) into one nostril alternating nostrils once as needed for opioid reversal every 2-3 minutes until assistance arrives  Quantity: 0.2 mL  Refills: 0     nystatin 910073 UNIT/GM external powder  Commonly known as: Nystop  Used for: Candidal intertrigo      APPLY TOPICALLY TO THE AFFECTED AREA 2-3 TIMES DAILY AS NEEDED  Quantity: 60 g  Refills: 3     omeprazole 20 MG DR capsule  Commonly known as: PriLOSEC  Used for: Type 2 diabetes mellitus with hypoglycemia without coma, without long-term current use of insulin (H)      Dose: 20 mg  TAKE 1 CAPSULE(20 MG) BY MOUTH TWICE DAILY  Quantity: 180 capsule  Refills: 1     oxyCODONE IR 10 MG tablet  Commonly known as: ROXICODONE  Used for: Chronic pain syndrome, Fibromyalgia      Dose: 10 mg  Take 1 tablet (10 mg) by mouth every 6 hours as needed for moderate to severe pain  Quantity: 120 tablet  Refills: 0     potassium chloride john ER 20 MEQ CR tablet  Commonly known as: KLOR-CON M20  Used for: Acute and chronic respiratory failure with hypercapnia (H)      TAKE 1 TABLET(20 MEQ) BY MOUTH DAILY  Quantity: 90 tablet  Refills: 3     sucralfate 1 GM tablet  Commonly known as: CARAFATE  Used for: Iron deficiency anemia due to chronic blood loss      CRUSH  ONE TABLET AND MIX WITH A LLITTLE WATER AND SWALLOW FOUR TIMES DAILY  Quantity: 360 tablet  Refills: 3     Symbicort 160-4.5 MCG/ACT Inhaler  Used for: Pulmonary emphysema, unspecified emphysema type (H)  Generic drug: budesonide-formoterol      Dose: 2 puff  INHALE 2 PUFFS INTO THE LUNGS TWICE DAILY  Quantity: 10.2 g  Refills: 4     UNABLE TO FIND  Used for: Type 2 diabetes mellitus with hyperglycemia, unspecified whether long term insulin use (H)      [GENERIC LANCETS (FINGERSTIX LANCETS)] Dispense brand per patient's insurance at pharmacy discretion.  Quantity: 300 each  Refills: 0           * This list has 2 medication(s) that are the same as other medications prescribed for you. Read the directions carefully, and ask your doctor or other care provider to review them with you.                   Where to get your medicines        These medications were sent to [id number] - [place], [place] - [address] AT [facility] [address], [place] [address]      Hours: called pharm.  they do accept faxes Phone: [phone]   apixaban ANTICOAGULANT 5 MG tablet  cefdinir 300 MG capsule  furosemide 20 MG tablet              Rationale for medication changes:      Continue with 1 more dose of cefdinir for leg wound; increase Lasix for HFpEF        Consults   Hospitalist  Cardiac rehab  Pulmonology       Pertinent Labs     Lab Results   Component Value Date    WBC 6.3 02/23/2024    HGB 12.5 02/23/2024    HCT 40.1 02/23/2024    MCV 97 02/23/2024     (L) 02/23/2024     Lab Results   Component Value Date    CR 0.66 02/23/2024     Lab Results   Component Value Date     02/23/2024    POTASSIUM 4.0 02/23/2024    CHLORIDE 105 02/23/2024    CO2 35 (H) 02/23/2024     (H) 02/23/2024     Lab Results   Component Value Date    GFRESTIMATED >90 02/23/2024    GFRESTBLACK >60 11/11/2020           Discharge Orders     Discharge Procedure Orders   Basic  "metabolic panel  (Ca, Cl, CO2, Creat, Gluc, K, Na, BUN)   Standing Status: Future Standing Exp. Date: 02/23/25     CARDIAC REHAB REFERRAL   Standing Status: Future   Referral Priority: Routine Referral Type: Rehab Therapy Cardiac Therapy   Number of Visits Requested: 1     Reason for your hospital stay   Order Comments: New valve     Follow-up and recommended labs and tests    Order Comments: Follow ups listed on AVS     Activity   Order Comments: Activity restrictions as per AVS     Order Specific Question Answer Comments   Is discharge order? Yes      Adult Cardiac Mobile Telemetry Monitor   Standing Status: Future Standing Exp. Date: 02/20/25     Order Specific Question Answer Comments   Patient should wear the monitor for? 14 days    Order completed for? Adult Cardiology      Diet   Order Comments: Resume previous home diet     Order Specific Question Answer Comments   Is discharge order? Yes      Examination     Vital Signs in last 24 hours:   Vital signs:  Temp: 98.2  F (36.8  C) Temp src: Oral BP: 100/52 Pulse: 69   Resp: 16 SpO2: 96 % O2 Device: Nasal cannula Oxygen Delivery: 2.5 LPM (this is patient's home O2 order and use) Height: 165.1 cm (5' 5\") Weight: 59.6 kg (131 lb 6.4 oz)  Estimated body mass index is 21.87 kg/m  as calculated from the following:    Height as of this encounter: 1.651 m (5' 5\").    Weight as of this encounter: 59.6 kg (131 lb 6.4 oz).           General Appearance:   Alert, cooperative and in no acute distress   ENT/Mouth: membranes moist, no oral lesions or bleeding gums.      EYES:  no scleral icterus, normal conjunctivae   Neck: Supple without lymphadenopathy.  Thyroid not visualized   Chest/Lungs:   diminished   Cardiovascular:   Regular. Normal first and second heart sounds with no murmurs, rubs, or gallops; the carotid, radial and posterior tibial pulses are intact, no edema bilaterally    Abdomen:  Soft and nontender. Bowel sounds are present in all quadrants   Extremities: no " cyanosis or clubbing.   Skin: no xanthelasma, warm.    Neurologic: normal gait, normal  bilateral, no tremors   Psychiatric: Normal mood and affect         Please see EMR for more detailed significant labs, imaging, consultant notes etc.    50 MINUTES SPENT BY ME on the date of service doing chart review, history, exam, documentation & further activities per the note.         Margy Rea PA-C  Structural Heart Program  [facility]

## 2024-02-26 LAB
PATH REPORT.COMMENTS IMP SPEC: NORMAL
PATH REPORT.FINAL DX SPEC: NORMAL
PATH REPORT.GROSS SPEC: NORMAL
PATH REPORT.MICROSCOPIC SPEC OTHER STN: NORMAL
PATH REPORT.RELEVANT HX SPEC: NORMAL

## 2024-02-26 PROCEDURE — 88305 TISSUE EXAM BY PATHOLOGIST: CPT | Mod: 26 | Performed by: PATHOLOGY

## 2024-02-26 PROCEDURE — 88112 CYTOPATH CELL ENHANCE TECH: CPT | Mod: 26 | Performed by: PATHOLOGY

## 2024-02-27 ENCOUNTER — OFFICE VISIT (OUTPATIENT)
Dept: FAMILY MEDICINE | Facility: CLINIC | Age: 74
End: 2024-02-27
Payer: COMMERCIAL

## 2024-02-27 VITALS
SYSTOLIC BLOOD PRESSURE: 90 MMHG | TEMPERATURE: 97.7 F | OXYGEN SATURATION: 90 % | RESPIRATION RATE: 16 BRPM | HEART RATE: 74 BPM | DIASTOLIC BLOOD PRESSURE: 56 MMHG

## 2024-02-27 DIAGNOSIS — F33.1 MODERATE EPISODE OF RECURRENT MAJOR DEPRESSIVE DISORDER (H): ICD-10-CM

## 2024-02-27 DIAGNOSIS — E11.42 DIABETIC POLYNEUROPATHY ASSOCIATED WITH TYPE 2 DIABETES MELLITUS (H): ICD-10-CM

## 2024-02-27 DIAGNOSIS — G89.4 CHRONIC PAIN SYNDROME: ICD-10-CM

## 2024-02-27 DIAGNOSIS — Z95.2 S/P TAVR (TRANSCATHETER AORTIC VALVE REPLACEMENT): ICD-10-CM

## 2024-02-27 DIAGNOSIS — Z09 HOSPITAL DISCHARGE FOLLOW-UP: Primary | ICD-10-CM

## 2024-02-27 DIAGNOSIS — Z29.11 NEED FOR VACCINATION AGAINST RESPIRATORY SYNCYTIAL VIRUS: ICD-10-CM

## 2024-02-27 DIAGNOSIS — M79.10 TRIGGER POINT: ICD-10-CM

## 2024-02-27 DIAGNOSIS — M79.7 FIBROMYALGIA: ICD-10-CM

## 2024-02-27 DIAGNOSIS — J43.9 PULMONARY EMPHYSEMA, UNSPECIFIED EMPHYSEMA TYPE (H): ICD-10-CM

## 2024-02-27 PROCEDURE — 99214 OFFICE O/P EST MOD 30 MIN: CPT | Mod: 25 | Performed by: FAMILY MEDICINE

## 2024-02-27 PROCEDURE — 20553 NJX 1/MLT TRIGGER POINTS 3/>: CPT | Performed by: FAMILY MEDICINE

## 2024-02-27 RX ORDER — OXYCODONE HYDROCHLORIDE 10 MG/1
10 TABLET ORAL EVERY 6 HOURS PRN
Qty: 120 TABLET | Refills: 0 | Status: SHIPPED | OUTPATIENT
Start: 2024-02-27 | End: 2024-04-02

## 2024-02-27 RX ORDER — RESPIRATORY SYNCYTIAL VIRUS VACCINE 120MCG/0.5
0.5 KIT INTRAMUSCULAR ONCE
Qty: 1 EACH | Refills: 0 | Status: CANCELLED | OUTPATIENT
Start: 2024-02-27 | End: 2024-02-27

## 2024-02-27 RX ORDER — LIDOCAINE HYDROCHLORIDE 10 MG/ML
10 INJECTION, SOLUTION INFILTRATION; PERINEURAL ONCE
Status: COMPLETED | OUTPATIENT
Start: 2024-02-27 | End: 2024-02-27

## 2024-02-27 RX ADMIN — LIDOCAINE HYDROCHLORIDE 10 ML: 10 INJECTION, SOLUTION INFILTRATION; PERINEURAL at 17:27

## 2024-02-27 NOTE — PROGRESS NOTES
1. Hospital discharge follow-up  2. S/P TAVR (transcatheter aortic valve replacement)  This is a 73 yo female here for hospital follow up - had aortic valve replacement - actually looks stronger and more energetic.  Will have follow up with cardiology.      3. Need for vaccination against respiratory syncytial virus  Due for RSV vaccine - discussed -     4. Pulmonary emphysema, unspecified emphysema type (H)  COPD - chronic - no new symptoms -     5. Diabetic polyneuropathy associated with type 2 diabetes mellitus (H)  Diabetic neuropathy - with type II  Hemoglobin A1C   Date Value Ref Range Status   02/06/2024 6.2 (H) <5.7 % Final     Comment:     Normal <5.7%   Prediabetes 5.7-6.4%    Diabetes 6.5% or higher     Note: Adopted from ADA consensus guidelines.         6. Chronic pain syndrome  Patient here for trigger point injection , also uses chronic Oxycodone therapy   - lidocaine 1 % injection 10 mL  - MSK: Inject Trigger Points, >3  - oxyCODONE IR (ROXICODONE) 10 MG tablet; Take 1 tablet (10 mg) by mouth every 6 hours as needed for moderate to severe pain  Dispense: 120 tablet; Refill: 0    7. Moderate episode of recurrent major depressive disorder (H)  H/o depression , related to chronic medical conditions -     8. Fibromyalgia  9. Trigger point  This is a 73 yo female with fibromyalgia/trigger point pain/chronic pain syndrome - she has benefitted from regular trigger point injections - here for her monthly trigger point injection - 6 points identified, and injected with 1.6 ml of 1% lidocaine each   - lidocaine 1 % injection 10 mL  - MSK: Inject Trigger Points, >3  - oxyCODONE IR (ROXICODONE) 10 MG tablet; Take 1 tablet (10 mg) by mouth every 6 hours as needed for moderate to severe pain  Dispense: 120 tablet; Refill: 0    [facility]    Procedure: MSK: Inject Trigger Points, >3    Date/Time: 2/27/2024 5:12 PM    Performed by: Cammy Bui MD  Authorized by:  Cammy Bui MD      UNIVERSAL PROTOCOL   Site Marked: Yes  Prior Images Obtained and Reviewed:  NA  Required items: Required blood products, implants, devices and special equipment available    Patient identity confirmed:  Verbally with patient  NA - No sedation, light sedation, or local anesthesia  Confirmation Checklist:  Procedure was appropriate and matched the consent or emergent situation, correct equipment/implants were available, relevant allergies and patient's identity using two indicators  Time out: Immediately prior to the procedure a time out was called    Universal Protocol: the Joint Commission Universal Protocol was followed    Preparation: Patient was prepped and draped in usual sterile fashion      SEDATION    Patient Sedated: No      PROCEDURE  Describe Procedure: 6 trigger points injected - on upper thoracic/cervical trigger points   Patient Tolerance:  Patient tolerated the procedure well with no immediate complications  Length of time physician/provider present for 1:1 monitoring during sedation: 0        Carol Muñiz is a 74 year old, presenting for the following health issues:  [facility] and Trigger Point Injection        2/27/2024     4:56 PM   Additional Questions   Roomed by Antonia     1/29/2024 - last filled oxycodone - per       History of Present Illness       Reason for visit:  Trigger point injections        Review of Systems   Constitutional:  Positive for fatigue. Negative for chills and fever.   HENT:  Negative for ear pain and sore throat.    Eyes:  Negative for pain and visual disturbance.   Respiratory:  Positive for shortness of breath. Negative for cough.    Cardiovascular:  Negative for chest pain and palpitations.   Gastrointestinal:  Negative for abdominal pain.   Genitourinary:  Negative for dysuria and hematuria.   Musculoskeletal:  Positive for arthralgias, myalgias and neck pain. Negative for back pain.    Skin:  Negative for color change and rash.   Neurological:  Positive for weakness. Negative for seizures and syncope.   All other systems reviewed and are negative.          Objective    BP 90/56 (BP Location: Right arm, Patient Position: Sitting, Cuff Size: Adult Regular)   Pulse 74   Temp 97.7  F (36.5  C) (Temporal)   Resp 16   LMP  (LMP Unknown)   SpO2 90%   There is no height or weight on file to calculate BMI.  Physical Exam  Vitals reviewed.   Constitutional:       General: She is not in acute distress.     Appearance: Normal appearance. She is ill-appearing (but looks stronger than most previous visits).   HENT:      Head: Normocephalic.      Right Ear: Tympanic membrane, ear canal and external ear normal.      Left Ear: Tympanic membrane, ear canal and external ear normal.      Nose: Nose normal.      Mouth/Throat:      Mouth: Mucous membranes are moist.      Pharynx: No posterior oropharyngeal erythema.   Eyes:      Extraocular Movements: Extraocular movements intact.      Conjunctiva/sclera: Conjunctivae normal.      Pupils: Pupils are equal, round, and reactive to light.   Cardiovascular:      Rate and Rhythm: Normal rate and regular rhythm.      Pulses: Normal pulses.      Heart sounds: Murmur (aortic stenosis murmur is much softer) heard.   Pulmonary:      Effort: Pulmonary effort is normal.      Breath sounds: Normal breath sounds.   Abdominal:      Palpations: Abdomen is soft. There is no mass.      Tenderness: There is no abdominal tenderness. There is no guarding or rebound.   Musculoskeletal:         General: Tenderness (multiple tender trigger points, especially at neck/suprascapular/upper thoracic) present. No deformity. Normal range of motion.      Cervical back: Normal range of motion and neck supple.   Lymphadenopathy:      Cervical: No cervical adenopathy.   Skin:     General: Skin is warm and dry.   Neurological:      General: No focal deficit present.      Mental Status: She is  alert.   Psychiatric:         Mood and Affect: Mood normal.         Behavior: Behavior normal.            No results found for any visits on 02/27/24.        Signed Electronically by: SAVANA SILVER MD

## 2024-02-28 ENCOUNTER — TELEPHONE (OUTPATIENT)
Dept: CARDIOLOGY | Facility: CLINIC | Age: 74
End: 2024-02-28

## 2024-02-28 DIAGNOSIS — I48.0 PAROXYSMAL ATRIAL FIBRILLATION (H): ICD-10-CM

## 2024-02-28 NOTE — TELEPHONE ENCOUNTER
"Incoming call from [employer], with urgent monitor notification. Auto-triggered event \"SB with 4 second pause, HR 40 BMP, 7:36 am.\" Thanked [employer] rep for information, will forward to valve team for notification. LKC  "

## 2024-02-28 NOTE — TELEPHONE ENCOUNTER
From: Moises Valencia MD  Sent: 2/28/2024   9:11 AM CST  To: Rudy Oseguera MD; *    Looks like mostly a sinus pause but let's have our EP colleagues (copying them in on this) think is the most appropriate management for that may be - ?PPM.  IF still on christiane agents (BB or CCB) can we hold those in the meantime?    Thank you!  Moises

## 2024-02-28 NOTE — TELEPHONE ENCOUNTER
Pt is on metoprolol 25mg twice daily. Pt states they took their morning dose already. Pt will hold until given further instructions from heart team.    Pt states they are doing fine and denies any symptoms of dizziness/lightheadedness, syncope and chest pain. Will call pt back with an update. Patient verbalizes understanding and agrees with plan. No further questions or concerns at this time.

## 2024-02-29 ENCOUNTER — OFFICE VISIT (OUTPATIENT)
Dept: CARDIOLOGY | Facility: CLINIC | Age: 74
End: 2024-02-29
Attending: INTERNAL MEDICINE
Payer: COMMERCIAL

## 2024-02-29 VITALS
BODY MASS INDEX: 21.05 KG/M2 | HEIGHT: 66 IN | SYSTOLIC BLOOD PRESSURE: 112 MMHG | RESPIRATION RATE: 16 BRPM | DIASTOLIC BLOOD PRESSURE: 58 MMHG | WEIGHT: 131 LBS | HEART RATE: 69 BPM

## 2024-02-29 DIAGNOSIS — I48.11 LONGSTANDING PERSISTENT ATRIAL FIBRILLATION (H): ICD-10-CM

## 2024-02-29 DIAGNOSIS — J90 PLEURAL EFFUSION: ICD-10-CM

## 2024-02-29 DIAGNOSIS — I35.0 AORTIC STENOSIS, SEVERE: ICD-10-CM

## 2024-02-29 DIAGNOSIS — I50.32 CHRONIC HEART FAILURE WITH PRESERVED EJECTION FRACTION (H): Primary | ICD-10-CM

## 2024-02-29 DIAGNOSIS — I10 ESSENTIAL HYPERTENSION: ICD-10-CM

## 2024-02-29 PROBLEM — I50.33 ACUTE ON CHRONIC DIASTOLIC HEART FAILURE (H): Status: RESOLVED | Noted: 2023-06-05 | Resolved: 2024-02-29

## 2024-02-29 LAB
ANION GAP SERPL CALCULATED.3IONS-SCNC: 13 MMOL/L (ref 7–15)
BUN SERPL-MCNC: 13.6 MG/DL (ref 8–23)
CALCIUM SERPL-MCNC: 8.8 MG/DL (ref 8.8–10.2)
CHLORIDE SERPL-SCNC: 102 MMOL/L (ref 98–107)
CREAT SERPL-MCNC: 0.76 MG/DL (ref 0.51–0.95)
DEPRECATED HCO3 PLAS-SCNC: 29 MMOL/L (ref 22–29)
EGFRCR SERPLBLD CKD-EPI 2021: 82 ML/MIN/1.73M2
GLUCOSE SERPL-MCNC: 90 MG/DL (ref 70–99)
POTASSIUM SERPL-SCNC: 4.2 MMOL/L (ref 3.4–5.3)
SODIUM SERPL-SCNC: 144 MMOL/L (ref 135–145)

## 2024-02-29 PROCEDURE — 99214 OFFICE O/P EST MOD 30 MIN: CPT | Performed by: NURSE PRACTITIONER

## 2024-02-29 PROCEDURE — 36415 COLL VENOUS BLD VENIPUNCTURE: CPT | Performed by: NURSE PRACTITIONER

## 2024-02-29 PROCEDURE — G2211 COMPLEX E/M VISIT ADD ON: HCPCS | Performed by: NURSE PRACTITIONER

## 2024-02-29 PROCEDURE — 80048 BASIC METABOLIC PNL TOTAL CA: CPT | Performed by: NURSE PRACTITIONER

## 2024-02-29 RX ORDER — METOPROLOL TARTRATE 25 MG/1
12.5 TABLET, FILM COATED ORAL 2 TIMES DAILY
Qty: 90 TABLET | Refills: 3 | Status: SHIPPED | OUTPATIENT
Start: 2024-02-29 | End: 2024-03-04 | Stop reason: SINTOL

## 2024-02-29 NOTE — TELEPHONE ENCOUNTER
Called the pt and discussed the recommendations. Had pt write the changes down. Also informed pt the metoprolol prescription will be updated at their preferred pharmacy. Patient verbalizes understanding and agrees with plan. No further questions or concerns at this time.

## 2024-02-29 NOTE — TELEPHONE ENCOUNTER
From: Moises Valencia MD  Sent: 2/29/2024  10:03 AM CST  To: RAUDEL Fall CNP; *    Let's stop digoxin, re-start BB at half dose, continue monitor.    Thx!  Moises

## 2024-02-29 NOTE — TELEPHONE ENCOUNTER
From: Rudy Oseguera MD  Sent: 2/28/2024   3:35 PM CST  To: Moises Valencia MD    Thanks for the note - agree with MY, looks like sinus slowing and pause, likely vagally mediated.  No noted AV block.  IVCD noted.  If asymptomatic, no intervention/PPM necessary.    Thanks,  Mary     Punch Biopsy Wound Care    Your doctor has performed a punch biopsy today.  A band aid and antibiotic ointment has been placed over the site.  This should remain in place for 24 hours.  It is recommended that you keep the area dry for the first 24 hours.  After 24 hours, you may remove the band aid and wash the area with warm soap and water and apply Vaseline jelly.  Many patients prefer to use Neosporin or Bacitracin ointment.  This is acceptable; however know that you can develop an allergy to this medication even if you have used it safely for years.  It is important to keep the area moist.  Letting it dry out and get air slows healing time, will worsen the scar, and make it more difficult to remove the stitches if they were placed.  Band aid is optional after first 24 hours.      If you notice increasing redness, tenderness, pain, or yellow drainage at the biopsy or surgical site, please notify your doctor.  These are signs of an infection.    If your biopsy/surgical site is bleeding, apply firm pressure for 15 minutes straight.  Repeat for another 15 minutes, if it is still bleeding.   If the surgical site continues to bleed, then please contact your doctor.      For [facility] users:   You will receive your biopsy results in [facility] as soon as they are available. Please be assured that your physician/provider will review your results and will then determine what further treatment, evaluation, or planning is required. You should be contacted by your physician's/provider's office within 5 business days of receiving your results; If not, please reach out to directly. This is one more way [facility] is putting you first.       [facility] [place] [address]/ [phone] FAX/ [email or web address]      Sun Protection      The Ochsner [facility] - DERMATOLOGY [address]  [address]  [place] [address]  Dept: [phone]  Dept Fax: [phone]

## 2024-02-29 NOTE — PATIENT INSTRUCTIONS
Mary Kay Tejada,    It was a pleasure to see you today at [facility].     My recommendations after this visit include:  - Please follow up with Dr Pizano in 4 months   - Please follow up with Margy Rea March 28  - Please follow up with Alexandria Lynn in 2 months  - I have checked labs  - Echocardiogram March 21  - Stop digoxin  - Reduce metoprolol to 12.5 mg (1/2 tablet) twice a day    Alexandria Lynn, CNP

## 2024-02-29 NOTE — PROGRESS NOTES
Assessment/Recommendations   Assessment:    1.  Heart failure with preserved ejection fraction, NYHA class III: Compensated.  Her dyspnea on exertion and energy have improved since hospitalization and TAVR.  Her weight has been stable.  She is trying to follow a low-sodium diet.  We discussed monitoring symptoms, following a low-sodium diet and monitoring daily weights.  2.  Severe aortic stenosis: Status post TAVR February 20, 2024.  She states she has had improved dyspnea on exertion and energy  3.  Hypertension: Blood pressure well-controlled today at 112/58  4.  Paroxysmal atrial fibrillation: Normal sinus rhythm.  Continues Eliquis for anticoagulation  5.  QRS widening, first-degree AV block: Noted postprocedure during hospitalization.  Discharged home with [facility].  Had a 4-second pause yesterday morning and was recommended to hold her metoprolol until further notice.  Dr. Valencia discussed with Dr Oseguera. Recommendations were to stop digoxin and reduce lopressor to 12.5 mg twice a day.  6.  Recurrent pleural effusions: Status post right thoracentesis on February 7 which removed 1 L of fluid    Plan:  1.  BMP pending  2.  Echocardiogram scheduled March 21  3.  Cardiac rehab evaluation scheduled March 7  4.  Stop digoxin and reduce Lopressor to 12.5 mg twice a day  5.  Continue monitoring daily weights and following a low-salt diet    Mary Kay Tejada will follow up with Dr. Pizano in 4 months, Margy Rea March 28 and in the heart failure clinic in 2 months.     History of Present Illness/Subjective    Ms. Mary Kay Tejada is a 74 year old female seen at [facility] heart failure clinic today for continued follow-up.  Her family member accompanies her today.  She follows up for heart failure with preserved ejection fraction.  She underwent TAVR on February 20, 2024.  Her echocardiogram postprocedure showed an ejection fraction of 60% with mean gradient of 4 mmHg.  Postprocedure she was  "noted to have QRS widening and first-degree AV block.  It was recommended she discharge with [facility]. 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  Status post TAVR February 20, 2024.    Today, she states she is feeling well.  Her dyspnea on exertion and energy have improved since hospitalization.  Her dizziness has also improved.  She denies lightheadedness, shortness of breath, orthopnea, PND, palpitations, chest pain, abdominal fullness/bloating, and lower extremity edema.      She is monitoring home weights which are stable around 130 pounds.  She is following a low sodium diet.         ECHOCARDIOGRAM: 2/21/2024-reviewed  Interpretation Summary     1. Technically difficult study (imaging could not be obtained from the  parasternal window).  2. Normal left ventricular size and systolic performance with a visually  estimated ejection fraction of 60%.  3. There is a bio-prosthetic aortic valve (documented 26 mm Douglas Mai 3  Resilia tissue valve).  Â  Normal aortic valve prosthesis metrics with a mean systolic gradient of 4  mmHg and a peak anterograde velocity of 1.5 m/sec. The Ao Acceleration Time is  0.14 sec.  Â  No aortic insufficiency is detected.  4. Probable normal right ventricular size and systolic performance though  right-sided structures are not clearly visualized on all views on this study.     Physical Examination Review of Systems   /58 (BP Location: Right arm, Patient Position: Sitting, Cuff Size: Adult Regular)   Pulse 69   Resp 16   Ht 1.676 m (5' 6\")   Wt 59.4 kg (131 lb)   LMP  (LMP Unknown)   BMI 21.14 kg/m    Body mass index is 21.14 kg/m .  Wt Readings from Last 3 Encounters:   02/29/24 59.4 kg (131 lb)   02/23/24 59.6 kg (131 lb 6.4 oz)   02/19/24 59 kg (130 lb)       General Appearance:   no acute " distress   ENT/Mouth: No abnormalities   EYES:  no scleral icterus, normal conjunctivae   Neck: no thyromegaly   Chest/Lungs:   lungs are decreased to auscultation, no rales or wheezing, equal chest wall expansion    Cardiovascular:   Regular. Normal first and second heart sounds with no murmurs, rubs, or gallops, no edema bilaterally    Abdomen:  bowel sounds are present   Extremities: no cyanosis or clubbing   Skin: warm   Neurologic: no tremors     Psychiatric: alert and oriented x3                                              Medical History  Surgical History Family History Social History   Past Medical History:   Diagnosis Date    Anemia     Aortic stenosis     Aortic valve disorder     Atrial fibrillation (H)     Atrial flutter (H)     Benign neoplasm of adenomatous polyp     large intestine     Chronic constipation     Chronic heart failure with preserved ejection fraction (H) 02/29/2024    Chronic pain syndrome     Congestive heart failure (H)     COPD (chronic obstructive pulmonary disease) (H)     Oxygen at night     Dependence on supplemental oxygen     Oxygen at noc, during the day as needed    Depression     Diabetes mellitus (H)     Dry eye syndrome     Fibromyalgia     Ganglion     left wrist    GERD (gastroesophageal reflux disease)     Hyperlipidemia     Hypertension     Hypokalemia     Infective otitis externa, unspecified     Created by Conversion     Larynx edema     Lung disease     Malignant neoplasm of vulva (H)     Created by Conversion [facility] Annotation: Apr 17 2007  8:24AM - Cammy Bui:  resection per Dr. Alfonso Mane 9/06;  Replacement Utility updated for latest IMO load    Medial epicondylitis     Onychomycosis     Osteoarthritis     Peptic ulcer     Polyneuropathy     Vulvar malignant neoplasm (H)     Past Surgical History:   Procedure Laterality Date    BIOPSY BREAST Right     BIOPSY BREAST Right 01/28/2015    BIOPSY BREAST Right 01/28/2015    Procedure: RIGHT  BREAST BIOPSY AFTER WIRE LOCALIZATION AT 0940;  Surgeon: Renée Soriano MD;  Location: [facility] OR;  Service:     BIOPSY OF BREAST, INCISIONAL      Description: Incisional Breast Biopsy;  Recorded: 11/13/2007;  Comments: benign    COLONOSCOPY N/A 06/14/2019    Procedure: COLONOSCOPY;  Surgeon: Eduardo Mora MD;  Location: [facility] OR;  Service: Gastroenterology    CV CORONARY ANGIOGRAM N/A 02/08/2024    Procedure: CV CORONARY ANGIOGRAM;  Surgeon: Moises Valencia MD;  Location: [facility]    CV LEFT HEART CATH N/A 02/08/2024    Procedure: Left Heart Catheterization;  Surgeon: Moises Valencia MD;  Location: [facility]    CV RIGHT HEART CATH MEASUREMENTS RECORDED N/A 02/08/2024    Procedure: Right Heart Catheterization;  Surgeon: Moises Valencia MD;  Location: [facility]    CV TRANSCATHETER AORTIC VALVE REPLACEMENT-FEMORAL APPROACH N/A 02/20/2024    Procedure: Transcatheter Aortic Valve Replacement, possible cardiopulmonary bypass, possible surgical intervention;  Surgeon: Moises Valencia MD;  Location: [facility] CV    ESOPHAGOSCOPY, GASTROSCOPY, DUODENOSCOPY (EGD), COMBINED N/A 11/06/2018    Procedure: ESOPHAGOGASTRODUODENOSCOPY;  Surgeon: Lit Fernando MD;  Location: [facility];  Service:     HYSTERECTOMY      JOINT REPLACEMENT Left     TKA    OR TRANSCATHETER AORTIC VALVE REPLACEMENT, FEMORAL PERCUTANEOUS APPROACH (STANDBY) N/A 02/20/2024    Procedure: OR TRANSCATHETER AORTIC VALVE REPLACEMENT, FEMORAL PERCUTANEOUS APPROACH (STANDBY);  Surgeon: Ishmael Farias MD;  Location: [facility] CV    PICC TRIPLE LUMEN PLACEMENT  01/12/2023         RI ABLATE HEART DYSRHYTHM FOCUS      Description: Catheter Ablation Atrial Fibrillation;  Recorded: 07/31/2012;  Comments: 7/24/12 PVI with Dr. Gardiner and nilay to all 5 pulm veins and CTI fl ablation line as well.    TRANSCATHETER AORTIC-VALVE REPLACEMENT      ZZC SUPRACERV ABD  HYSTERECTOMY      Description: Supracervical Hysterectomy;  Proc Date: 01/01/1985;  Comments: some cervix left!; ovaries intact; done for bleeding    Family History   Problem Relation Age of Onset    Heart Failure Mother     Cancer Other         paternal HX-laryngeal     Alcoholism Sister     No Known Problems Daughter     No Known Problems Maternal Grandmother     No Known Problems Maternal Grandfather     No Known Problems Paternal Grandmother     No Known Problems Paternal Grandfather     No Known Problems Maternal Aunt     No Known Problems Paternal Aunt     Alcoholism Sister     Alcoholism Brother     Alcoholism Father     Cancer Paternal Uncle         Gastric-Alcohol    Cancer Paternal Uncle         gastric-Alcohol    Hereditary Breast and Ovarian Cancer Syndrome No family hx of     Breast Cancer No family hx of     Colon Cancer No family hx of     Endometrial Cancer No family hx of     Ovarian Cancer No family hx of     Social History     Socioeconomic History    Marital status:      Spouse name: Not on file    Number of children: Not on file    Years of education: Not on file    Highest education level: Not on file   Occupational History    Not on file   Tobacco Use    Smoking status: Some Days     Packs/day: .25     Types: Cigarettes     Passive exposure: Never    Smokeless tobacco: Never    Tobacco comments:     seen by [facility]TS inpatient on 3/31/22   Vaping Use    Vaping Use: Never used   Substance and Sexual Activity    Alcohol use: Yes     Comment: Alcoholic Drinks/day: very little    Drug use: No    Sexual activity: Not on file   Other Topics Concern    Not on file   Social History Narrative    Not on file     Social Determinants of Health     Financial Resource Strain: Low Risk  (12/26/2023)    Financial Resource Strain     Within the past 12 months, have you or your family members you live with been unable to get utilities (heat, electricity) when it was really needed?: No   Food Insecurity: Low  "Risk  (12/26/2023)    Food Insecurity     Within the past 12 months, did you worry that your food would run out before you got money to buy more?: No     Within the past 12 months, did the food you bought just not last and you didn t have money to get more?: No   Transportation Needs: Low Risk  (12/26/2023)    Transportation Needs     Within the past 12 months, has lack of transportation kept you from medical appointments, getting your medicines, non-medical meetings or appointments, work, or from getting things that you need?: No   Physical Activity: Not on file   Stress: Not on file   Social Connections: Not on file   Interpersonal Safety: Low Risk  (9/20/2023)    Interpersonal Safety     Do you feel physically and emotionally safe where you currently live?: Yes     Within the past 12 months, have you been hit, slapped, kicked or otherwise physically hurt by someone?: No     Within the past 12 months, have you been humiliated or emotionally abused in other ways by your partner or ex-partner?: No   Housing Stability: Low Risk  (12/26/2023)    Housing Stability     Do you have housing? : Yes     Are you worried about losing your housing?: No          Medications  Allergies   Current Outpatient Medications   Medication Sig Dispense Refill    ACCU-CHEK SOFTCLIX LANCETS lancets [ACCU-CHEK SOFTCLIX LANCETS LANCETS] TEST THREE TIMES DAILY 300 each 3    albuterol (PROAIR HFA/PROVENTIL HFA/VENTOLIN HFA) 108 (90 Base) MCG/ACT inhaler INHALE 2 PUFFS INTO THE LUNGS EVERY 4 HOURS AS NEEDED FOR WHEEZING 13.4 g 3    apixaban ANTICOAGULANT (ELIQUIS) 5 MG tablet Take 1 tablet (5 mg) by mouth 2 times daily 180 tablet 2    atorvastatin (LIPITOR) 20 MG tablet TAKE 1 TABLET(20 MG) BY MOUTH AT BEDTIME 90 tablet 3    BD ULTRA-FINE SHORT PEN NEEDLE 31 gauge x 5/16\" Ndle [BD ULTRA-FINE SHORT PEN NEEDLE 31 GAUGE X 5/16\" NDLE] TEST FOUR TIMES DAILY WITH MEALS AND AT BEDTIME 400 each 3    blood-glucose meter (ONETOUCH VERIO IQ METER) Misc " [BLOOD-GLUCOSE METER (ONETOUCH VERIO IQ METER) MISC] Check blood sugar three times a day. 1 each 0    diaper,brief,adult,disposable (ADULT BRIEFS - LARGE) Misc [DIAPER,BRIEF,ADULT,DISPOSABLE (ADULT BRIEFS - LARGE) MISC] Use 3-4 daily as needed for incontinence 120 each 6    furosemide (LASIX) 20 MG tablet Take 1 tablet (20 mg) by mouth daily 30 tablet 1    gabapentin (NEURONTIN) 600 MG tablet TAKE 1 TABLET(600 MG) BY MOUTH THREE TIMES DAILY 270 tablet 2    generic lancets (FINGERSTIX LANCETS) [GENERIC LANCETS (FINGERSTIX LANCETS)] Dispense brand per patient's insurance at pharmacy discretion. 300 each 0    ipratropium - albuterol 0.5 mg/2.5 mg/3 mL (DUONEB) 0.5-2.5 (3) MG/3ML neb solution Take 1 vial (3 mLs) by nebulization every 6 hours as needed for shortness of breath or wheezing 90 mL 0    JARDIANCE 10 MG TABS tablet TAKE 1 TABLET(10 MG) BY MOUTH DAILY 90 tablet 2    magnesium oxide (MAG-OX) 400 MG tablet Take 1 tablet (400 mg) by mouth daily 30 tablet 0    meclizine (ANTIVERT) 25 MG tablet TAKE 1 TABLET(25 MG) BY MOUTH THREE TIMES DAILY AS NEEDED FOR DIZZINESS OR NAUSEA 45 tablet 5    metoprolol tartrate (LOPRESSOR) 25 MG tablet Take 0.5 tablets (12.5 mg) by mouth 2 times daily 90 tablet 3    naloxone (NARCAN) 4 MG/0.1ML nasal spray Spray 1 spray (4 mg) into one nostril alternating nostrils once as needed for opioid reversal every 2-3 minutes until assistance arrives 0.2 mL 0    Nutritional Supplements (ENSURE COMPLETE) LIQD Take 1 Can by mouth daily 7110 mL 4    nystatin (NYSTOP) 028752 UNIT/GM external powder APPLY TOPICALLY TO THE AFFECTED AREA 2-3 TIMES DAILY AS NEEDED 60 g 3    omeprazole (PRILOSEC) 20 MG DR capsule TAKE 1 CAPSULE(20 MG) BY MOUTH TWICE DAILY 180 capsule 1    ONETOUCH VERIO IQ test strip TEST THREE TIMES DAILY 300 strip 0    oxyCODONE IR (ROXICODONE) 10 MG tablet Take 1 tablet (10 mg) by mouth every 6 hours as needed for moderate to severe pain 120 tablet 0    potassium chloride ER  "(KLOR-CON M) 20 MEQ CR tablet TAKE 1 TABLET(20 MEQ) BY MOUTH DAILY 90 tablet 3    sucralfate (CARAFATE) 1 GM tablet CRUSH ONE TABLET AND MIX WITH A LLITTLE WATER AND SWALLOW FOUR TIMES DAILY 360 tablet 3    SYMBICORT 160-4.5 MCG/ACT Inhaler INHALE 2 PUFFS INTO THE LUNGS TWICE DAILY 10.2 g 4    cefdinir (OMNICEF) 300 MG capsule Take 1 capsule (300 mg) by mouth every 12 hours (Patient not taking: Reported on 2/27/2024) 1 capsule 0    Allergies   Allergen Reactions    Celebrex [Celecoxib] Rash     patient had butterfly rash - \"lupus-like\"      Latex Rash         Lab Results    Chemistry/lipid CBC Cardiac Enzymes/BNP/TSH/INR   Lab Results   Component Value Date    CHOL 167 09/20/2023    HDL 75 09/20/2023    TRIG 83 09/20/2023    BUN 12.8 02/23/2024     02/23/2024    CO2 35 (H) 02/23/2024    Lab Results   Component Value Date    WBC 6.3 02/23/2024    HGB 12.5 02/23/2024    HCT 40.1 02/23/2024    MCV 97 02/23/2024     (L) 02/23/2024    Lab Results   Component Value Date    TROPONINI 0.07 05/24/2023     (H) 06/02/2023    TSH 1.17 05/24/2023    INR 1.04 02/19/2024             This note has been dictated using voice recognition software. Any grammatical, typographical, or context distortions are unintentional and inherent to the software    30 minutes spent on the date of encounter doing chart review, review of outside records, review of test results, interpretation with above tests, patient visit, documentation, and discussion with family.        The longitudinal plan of care for heart failure with preserved ejection fraction, paroxysmal atrial fibrillation, hypertension, valvular heart disease, was addressed during this visit. Due to the added complexity in care, I will continue to support Mary Kay in the subsequent management of this condition(s) and with the ongoing continuity of care of this condition(s).              "

## 2024-03-01 ENCOUNTER — TELEPHONE (OUTPATIENT)
Dept: GERIATRIC MEDICINE | Facility: CLINIC | Age: 74
End: 2024-03-01
Payer: COMMERCIAL

## 2024-03-01 ENCOUNTER — PATIENT OUTREACH (OUTPATIENT)
Dept: GERIATRIC MEDICINE | Facility: CLINIC | Age: 74
End: 2024-03-01
Payer: COMMERCIAL

## 2024-03-01 DIAGNOSIS — N39.46 MIXED STRESS AND URGE URINARY INCONTINENCE: Primary | ICD-10-CM

## 2024-03-02 ENCOUNTER — HEALTH MAINTENANCE LETTER (OUTPATIENT)
Age: 74
End: 2024-03-02

## 2024-03-02 DIAGNOSIS — J43.9 PULMONARY EMPHYSEMA, UNSPECIFIED EMPHYSEMA TYPE (H): ICD-10-CM

## 2024-03-02 DIAGNOSIS — B37.2 CANDIDAL INTERTRIGO: ICD-10-CM

## 2024-03-02 DIAGNOSIS — J44.1 COPD EXACERBATION (H): ICD-10-CM

## 2024-03-02 NOTE — PROGRESS NOTES
[facility] Care Coordination Contact      [facility] Six-Month Telephone Assessment    6 month telephone assessment completed on 3/1/24.    ER visits: No  Hospitalizations: Yes -  [facility]  TCU stays: No  Significant health status changes: No  Falls/Injuries: No  ADL/IADL changes: No  Changes in services: No    Caregiver Assessment follow up:  NA    Goals: See POC in chart for goal progress documentation.      Will see member in 6 months for an annual health risk assessment.   Encouraged member to call CC with any questions or concerns in the meantime.     Samantha Alexandra RN, PHN   [facility]  [phone]

## 2024-03-03 ASSESSMENT — ENCOUNTER SYMPTOMS
MYALGIAS: 1
SEIZURES: 0
FATIGUE: 1
CHILLS: 0
EYE PAIN: 0
SORE THROAT: 0
WEAKNESS: 1
COUGH: 0
PALPITATIONS: 0
FEVER: 0
DYSURIA: 0
HEMATURIA: 0
ABDOMINAL PAIN: 0
COLOR CHANGE: 0
BACK PAIN: 0
SHORTNESS OF BREATH: 1
NECK PAIN: 1
ARTHRALGIAS: 1

## 2024-03-04 ENCOUNTER — TELEPHONE (OUTPATIENT)
Dept: CARDIOLOGY | Facility: CLINIC | Age: 74
End: 2024-03-04
Payer: COMMERCIAL

## 2024-03-04 DIAGNOSIS — R00.1 SINUS BRADYCARDIA: Primary | ICD-10-CM

## 2024-03-04 DIAGNOSIS — I45.5 SINUS PAUSE: ICD-10-CM

## 2024-03-04 DIAGNOSIS — I49.5 SINUS NODE DYSFUNCTION (H): ICD-10-CM

## 2024-03-04 RX ORDER — BUDESONIDE AND FORMOTEROL FUMARATE DIHYDRATE 160; 4.5 UG/1; UG/1
2 AEROSOL RESPIRATORY (INHALATION) 2 TIMES DAILY
Qty: 30.6 G | OUTPATIENT
Start: 2024-03-04

## 2024-03-04 RX ORDER — NYSTATIN 100000 [USP'U]/G
POWDER TOPICAL
Qty: 180 G | Refills: 0 | Status: SHIPPED | OUTPATIENT
Start: 2024-03-04

## 2024-03-04 RX ORDER — IPRATROPIUM BROMIDE AND ALBUTEROL SULFATE 2.5; .5 MG/3ML; MG/3ML
SOLUTION RESPIRATORY (INHALATION)
Qty: 90 ML | Refills: 0 | Status: SHIPPED | OUTPATIENT
Start: 2024-03-04 | End: 2024-07-12

## 2024-03-04 NOTE — TELEPHONE ENCOUNTER
----- Message from Kellee Montanez sent at 3/4/2024  8:34 AM CST -----  Regarding: LUCINDA PATIENT  General phone call:    Caller: RAYMOND    Primary cardiologist: MY    Detailed reason for call: PLEASE CALL FOR URGENT EKG FINDING    Best phone number: [phone]    Best time to contact: ANY    Ok to leave a detailedmessage? YES    Device? NO    Additional Info:      
Called the pt to discussed recommendations. Will stop metoprolol and take it off the pts medication list. Will place order for pt to be seen by EP and have scheduling call to setup. Patient verbalizes understanding and agrees with plan. No further questions or concerns at this time.    
From: Moises Valencia MD  Sent: 3/4/2024   9:54 AM CST  To: Jennifer Ospina RN    Let's stop the BB. Set her up w/ EP.    Thx!  Moises  
From: Nona Calle  Sent: 3/4/2024  10:37 AM CST  To: Jennifer Ospina RN    She's coming in tomorrow to see KA.  Thank you   
Spoke with Ag holt who states at 7:32am today, pt had a 3.3 second pause with an underlying rhythm of sinus bradycardia.     Called the pt who states they are feeling fine and denies shortness of breath, dizziness/lightheadedness, syncope and chest pain. Pt confirmed she did stop her digoxin last week and decreased her metoprolol to 12.5mg daily last week as well. Pt states they are doing well. Will forward message to Dr. Valencia for review. Patient verbalizes understanding and agrees with plan. No further questions or concerns at this time.              
None

## 2024-03-05 ENCOUNTER — PREP FOR PROCEDURE (OUTPATIENT)
Dept: CARDIOLOGY | Facility: CLINIC | Age: 74
End: 2024-03-05

## 2024-03-05 ENCOUNTER — OFFICE VISIT (OUTPATIENT)
Dept: CARDIOLOGY | Facility: CLINIC | Age: 74
End: 2024-03-05
Attending: INTERNAL MEDICINE
Payer: COMMERCIAL

## 2024-03-05 ENCOUNTER — DOCUMENTATION ONLY (OUTPATIENT)
Dept: CARDIOLOGY | Facility: CLINIC | Age: 74
End: 2024-03-05

## 2024-03-05 VITALS
RESPIRATION RATE: 16 BRPM | SYSTOLIC BLOOD PRESSURE: 108 MMHG | BODY MASS INDEX: 21.53 KG/M2 | HEART RATE: 74 BPM | DIASTOLIC BLOOD PRESSURE: 60 MMHG | HEIGHT: 66 IN | WEIGHT: 134 LBS

## 2024-03-05 DIAGNOSIS — I49.5 SINUS NODE DYSFUNCTION (H): ICD-10-CM

## 2024-03-05 DIAGNOSIS — I50.32 CHRONIC HEART FAILURE WITH PRESERVED EJECTION FRACTION (H): ICD-10-CM

## 2024-03-05 DIAGNOSIS — R00.1 SINUS BRADYCARDIA: ICD-10-CM

## 2024-03-05 DIAGNOSIS — I45.5 SINUS PAUSE: ICD-10-CM

## 2024-03-05 DIAGNOSIS — R00.1 SINUS BRADYCARDIA: Primary | ICD-10-CM

## 2024-03-05 DIAGNOSIS — R55 SYNCOPE: ICD-10-CM

## 2024-03-05 DIAGNOSIS — I48.11 LONGSTANDING PERSISTENT ATRIAL FIBRILLATION (H): ICD-10-CM

## 2024-03-05 PROCEDURE — G2211 COMPLEX E/M VISIT ADD ON: HCPCS | Performed by: INTERNAL MEDICINE

## 2024-03-05 PROCEDURE — 99205 OFFICE O/P NEW HI 60 MIN: CPT | Performed by: INTERNAL MEDICINE

## 2024-03-05 RX ORDER — MOMETASONE FUROATE AND FORMOTEROL FUMARATE DIHYDRATE 200; 5 UG/1; UG/1
2 AEROSOL RESPIRATORY (INHALATION) 2 TIMES DAILY
Qty: 39 G | Refills: 3 | Status: SHIPPED | OUTPATIENT
Start: 2024-03-05 | End: 2024-07-01

## 2024-03-05 RX ORDER — FENTANYL CITRATE 50 UG/ML
25 INJECTION, SOLUTION INTRAMUSCULAR; INTRAVENOUS
Status: CANCELLED | OUTPATIENT
Start: 2024-03-05

## 2024-03-05 RX ORDER — LIDOCAINE 40 MG/G
CREAM TOPICAL
Status: CANCELLED | OUTPATIENT
Start: 2024-03-05

## 2024-03-05 RX ORDER — DEXTROSE MONOHYDRATE 25 G/50ML
25-50 INJECTION, SOLUTION INTRAVENOUS
Status: CANCELLED | OUTPATIENT
Start: 2024-03-05

## 2024-03-05 RX ORDER — SODIUM CHLORIDE 9 MG/ML
100 INJECTION, SOLUTION INTRAVENOUS CONTINUOUS
Status: CANCELLED | OUTPATIENT
Start: 2024-03-07

## 2024-03-05 RX ORDER — NICOTINE POLACRILEX 4 MG
15-30 LOZENGE BUCCAL
Status: CANCELLED | OUTPATIENT
Start: 2024-03-05

## 2024-03-05 RX ORDER — CEFAZOLIN SODIUM 2 G/100ML
2 INJECTION, SOLUTION INTRAVENOUS
Status: CANCELLED | OUTPATIENT
Start: 2024-03-05

## 2024-03-05 RX ORDER — DEXMEDETOMIDINE HYDROCHLORIDE 4 UG/ML
.1-1.5 INJECTION, SOLUTION INTRAVENOUS CONTINUOUS
Status: CANCELLED | OUTPATIENT
Start: 2024-03-05

## 2024-03-05 NOTE — H&P (VIEW-ONLY)
HEART CARE ENCOUNTER CONSULTATON NOTE      [facility]  [phone]      Assessment/Recommendations   Assessment/Plan:    Mary Kay Tejada is a very pleasant 74 year old female with past medical history of heart failure with preserved ejection fraction, severe aortic stenosis s/p TAVR on February 20, 2024, paroxysmal atrial fibrillation, hypertension, nonobstructive CAD, COPD, osteoarthritis, fibromyalgia, GERD, recurrent pleural effusions who presents today to the EP clinic.    First degree AV delay and LBBB  - Currently wearing the monitor  - discussed need for meds for her AF I.e., metoprolol and digoxin that are currently being held  - will proceed with a pacemaker implant  - indications, procedural details, possible risk and complications discussed in detail  - will plan for a pacemaker implant on 3/7/2024    2. Paroxysmal AF  - Avoiding rate and rhythm control for now given #1  - Continue anticoagulation    3.  HTN  - well controlled    Time spent: 60 minutes spent on the date of the encounter doing chart review, history and exam, documentation and further activities as noted above.    The longitudinal plan of care for the condition(s) below were addressed during this visit. Due to the added complexity in care, I will continue support in the subsequent management of this condition(s) and with the ongoing continuity of care of this condition(s).          History of Present Illness/Subjective    HPI: Mary Kay Tejada is a very pleasant 74 year old female with past medical history of heart failure with preserved ejection fraction, severe aortic stenosis s/p TAVR on February 20, 2024, paroxysmal atrial fibrillation, hypertension, nonobstructive CAD, COPD, osteoarthritis, fibromyalgia, GERD, recurrent pleural effusions who presents today to the EP clinic.    Cecilia underwent TAVR procedure on the 20th of February. Post procedure, she was found to a new LBBB and a 1st degree AV block as  "well. She was sent home on an MCOT. An emergency notification of a 4 second pause was reviewed and her digoxin was stopped and metoprolol was decreased to 12.5 mg.    She was diagnosed with AF many years ago- she is not sure how long. She was initially on warfarin and is currently on Eliquis.     No chest pain, shortness of breath, orthopnea, PND, presyncope or syncope.    Recent Echocardiogram Results (personally reviewed):    1. Technically difficult study (imaging could not be obtained from the  parasternal window).  2. Normal left ventricular size and systolic performance with a visually  estimated ejection fraction of 60%.  3. There is a bio-prosthetic aortic valve (documented 26 mm Douglas Mai 3  Resilia tissue valve).  Â  Normal aortic valve prosthesis metrics with a mean systolic gradient of 4  mmHg and a peak anterograde velocity of 1.5 m/sec. The Ao Acceleration Time is  0.14 sec.  Â  No aortic insufficiency is detected.  4. Probable normal right ventricular size and systolic performance though  right-sided structures are not clearly visualized on all views on this study.      Labs below reviewed personally     Physical Examination  Review of Systems   Vitals: /60 (BP Location: Right arm, Patient Position: Sitting, Cuff Size: Adult Regular)   Pulse 74   Resp 16   Ht 1.676 m (5' 6\")   Wt 60.8 kg (134 lb)   LMP  (LMP Unknown)   BMI 21.63 kg/m    BMI= Body mass index is 21.63 kg/m .  Wt Readings from Last 3 Encounters:   03/05/24 60.8 kg (134 lb)   02/29/24 59.4 kg (131 lb)   02/23/24 59.6 kg (131 lb 6.4 oz)       General Appearance:   no distress, normal body habitus   ENT/Mouth: membranes moist, no oral lesions or bleeding gums.      EYES:  no scleral icterus, normal conjunctivae   Neck: no carotid bruits or thyromegaly   Chest/Lungs:   lungs are clear to auscultation, no rales or wheezing, no sternal scar, equal chest wall expansion    Cardiovascular:   Regular. Normal first and second heart " sounds with no murmurs, rubs, or gallops; the carotid, radial and posterior tibial pulses are intact, no edema bilaterally    Abdomen:  no organomegaly, masses, bruits, or tenderness; bowel sounds are present   Extremities: no cyanosis or clubbing   Skin: no xanthelasma, warm.    Neurologic: normal  bilateral, no tremors     Psychiatric: alert and oriented x3, calm        Please refer above for cardiac ROS details.        Medical History  Surgical History Family History Social History   Past Medical History:   Diagnosis Date    Anemia     Aortic stenosis     Aortic valve disorder     Atrial fibrillation (H)     Atrial flutter (H)     Benign neoplasm of adenomatous polyp     large intestine     Chronic constipation     Chronic heart failure with preserved ejection fraction (H) 02/29/2024    Chronic pain syndrome     Congestive heart failure (H)     COPD (chronic obstructive pulmonary disease) (H)     Oxygen at night     Dependence on supplemental oxygen     Oxygen at noc, during the day as needed    Depression     Diabetes mellitus (H)     Dry eye syndrome     Fibromyalgia     Ganglion     left wrist    GERD (gastroesophageal reflux disease)     Hyperlipidemia     Hypertension     Hypokalemia     Infective otitis externa, unspecified     Created by Conversion     Larynx edema     Lung disease     Malignant neoplasm of vulva (H)     Created by Conversion [facility] Annotation: Apr 17 2007  8:24AM - Cammy Bui:  resection per Dr. Alfonso Mane 9/06;  Replacement Utility updated for latest IMO load    Medial epicondylitis     Onychomycosis     Osteoarthritis     Peptic ulcer     Polyneuropathy     Vulvar malignant neoplasm (H)      Past Surgical History:   Procedure Laterality Date    BIOPSY BREAST Right     BIOPSY BREAST Right 01/28/2015    BIOPSY BREAST Right 01/28/2015    Procedure: RIGHT BREAST BIOPSY AFTER WIRE LOCALIZATION AT 0940;  Surgeon: Renée Soriano MD;  Location: [facility];   Service:     BIOPSY OF BREAST, INCISIONAL      Description: Incisional Breast Biopsy;  Recorded: 11/13/2007;  Comments: benign    COLONOSCOPY N/A 06/14/2019    Procedure: COLONOSCOPY;  Surgeon: Eduardo Mora MD;  [place] [facility] [place]  Service: Gastroenterology    CV CORONARY ANGIOGRAM N/A 02/08/2024    Procedure: CV CORONARY ANGIOGRAM;  Surgeon: Moises Valencia MD;  Location: [facility] CV    CV LEFT HEART CATH N/A 02/08/2024    Procedure: Left Heart Catheterization;  Surgeon: Moises Valencia MD;  Location: [facility]    CV RIGHT HEART CATH MEASUREMENTS RECORDED N/A 02/08/2024    Procedure: Right Heart Catheterization;  Surgeon: Moises Valencia MD;  Location: [facility]    CV TRANSCATHETER AORTIC VALVE REPLACEMENT-FEMORAL APPROACH N/A 02/20/2024    Procedure: Transcatheter Aortic Valve Replacement, possible cardiopulmonary bypass, possible surgical intervention;  Surgeon: Moises Valencia MD;  Location: [facility] CV    ESOPHAGOSCOPY, GASTROSCOPY, DUODENOSCOPY (EGD), COMBINED N/A 11/06/2018    Procedure: ESOPHAGOGASTRODUODENOSCOPY;  Surgeon: Lit Fernando MD;  [place] [facility] [place]  Service:     HYSTERECTOMY      JOINT REPLACEMENT Left     TKA    OR TRANSCATHETER AORTIC VALVE REPLACEMENT, FEMORAL PERCUTANEOUS APPROACH (STANDBY) N/A 02/20/2024    Procedure: OR TRANSCATHETER AORTIC VALVE REPLACEMENT, FEMORAL PERCUTANEOUS APPROACH (STANDBY);  Surgeon: Ishmael Farias MD;  Location: [facility] CV    PICC TRIPLE LUMEN PLACEMENT  01/12/2023         TN ABLATE HEART DYSRHYTHM FOCUS      Description: Catheter Ablation Atrial Fibrillation;  Recorded: 07/31/2012;  Comments: 7/24/12 PVI with Dr. Gardiner and nilay to all 5 pulm veins and CTI fl ablation line as well.    TRANSCATHETER AORTIC-VALVE REPLACEMENT      ZZC SUPRACERV ABD HYSTERECTOMY      Description: Supracervical Hysterectomy;  Proc Date: 01/01/1985;  Comments: some cervix left!;  ovaries intact; done for bleeding     Family History   Problem Relation Age of Onset    Heart Failure Mother     Cancer Other         paternal HX-laryngeal     Alcoholism Sister     No Known Problems Daughter     No Known Problems Maternal Grandmother     No Known Problems Maternal Grandfather     No Known Problems Paternal Grandmother     No Known Problems Paternal Grandfather     No Known Problems Maternal Aunt     No Known Problems Paternal Aunt     Alcoholism Sister     Alcoholism Brother     Alcoholism Father     Cancer Paternal Uncle         Gastric-Alcohol    Cancer Paternal Uncle         gastric-Alcohol    Hereditary Breast and Ovarian Cancer Syndrome No family hx of     Breast Cancer No family hx of     Colon Cancer No family hx of     Endometrial Cancer No family hx of     Ovarian Cancer No family hx of         Social History     Socioeconomic History    Marital status:      Spouse name: Not on file    Number of children: Not on file    Years of education: Not on file    Highest education level: Not on file   Occupational History    Not on file   Tobacco Use    Smoking status: Some Days     Packs/day: .25     Types: Cigarettes     Passive exposure: Never    Smokeless tobacco: Never    Tobacco comments:     seen by TTS inpatient on 3/31/22   Vaping Use    Vaping Use: Never used   Substance and Sexual Activity    Alcohol use: Yes     Comment: Alcoholic Drinks/day: very little    Drug use: No    Sexual activity: Not on file   Other Topics Concern    Not on file   Social History Narrative    Not on file     Social Determinants of Health     Financial Resource Strain: Low Risk  (12/26/2023)    Financial Resource Strain     Within the past 12 months, have you or your family members you live with been unable to get utilities (heat, electricity) when it was really needed?: No   Food Insecurity: Low Risk  (12/26/2023)    Food Insecurity     Within the past 12 months, did you worry that your food would run  "out before you got money to buy more?: No     Within the past 12 months, did the food you bought just not last and you didn t have money to get more?: No   Transportation Needs: Low Risk  (12/26/2023)    Transportation Needs     Within the past 12 months, has lack of transportation kept you from medical appointments, getting your medicines, non-medical meetings or appointments, work, or from getting things that you need?: No   Physical Activity: Not on file   Stress: Not on file   Social Connections: Not on file   Interpersonal Safety: Low Risk  (9/20/2023)    Interpersonal Safety     Do you feel physically and emotionally safe where you currently live?: Yes     Within the past 12 months, have you been hit, slapped, kicked or otherwise physically hurt by someone?: No     Within the past 12 months, have you been humiliated or emotionally abused in other ways by your partner or ex-partner?: No   Housing Stability: Low Risk  (12/26/2023)    Housing Stability     Do you have housing? : Yes     Are you worried about losing your housing?: No           Medications  Allergies   Current Outpatient Medications   Medication Sig Dispense Refill    ACCU-CHEK SOFTCLIX LANCETS lancets [ACCU-CHEK SOFTCLIX LANCETS LANCETS] TEST THREE TIMES DAILY 300 each 3    albuterol (PROAIR HFA/PROVENTIL HFA/VENTOLIN HFA) 108 (90 Base) MCG/ACT inhaler INHALE 2 PUFFS INTO THE LUNGS EVERY 4 HOURS AS NEEDED FOR WHEEZING 13.4 g 3    apixaban ANTICOAGULANT (ELIQUIS) 5 MG tablet Take 1 tablet (5 mg) by mouth 2 times daily 180 tablet 2    atorvastatin (LIPITOR) 20 MG tablet TAKE 1 TABLET(20 MG) BY MOUTH AT BEDTIME 90 tablet 3    BD ULTRA-FINE SHORT PEN NEEDLE 31 gauge x 5/16\" Ndle [BD ULTRA-FINE SHORT PEN NEEDLE 31 GAUGE X 5/16\" NDLE] TEST FOUR TIMES DAILY WITH MEALS AND AT BEDTIME 400 each 3    blood-glucose meter (ONETOUCH VERIO IQ METER) Misc [BLOOD-GLUCOSE METER (ONETOUCH VERIO IQ METER) MISC] Check blood sugar three times a day. 1 each 0    " diaper,brief,adult,disposable (ADULT BRIEFS - LARGE) Misc [DIAPER,BRIEF,ADULT,DISPOSABLE (ADULT BRIEFS - LARGE) MISC] Use 3-4 daily as needed for incontinence 120 each 6    furosemide (LASIX) 20 MG tablet Take 1 tablet (20 mg) by mouth daily 30 tablet 1    gabapentin (NEURONTIN) 600 MG tablet TAKE 1 TABLET(600 MG) BY MOUTH THREE TIMES DAILY 270 tablet 2    generic lancets (FINGERSTIX LANCETS) [GENERIC LANCETS (FINGERSTIX LANCETS)] Dispense brand per patient's insurance at pharmacy discretion. 300 each 0    ipratropium - albuterol 0.5 mg/2.5 mg/3 mL (DUONEB) 0.5-2.5 (3) MG/3ML neb solution INHALE 1 VIAL VIA NEBULIZER EVERY 6 HOURS AS NEEDED FOR SHORTNESS OF BREATH OR WHEEZING 90 mL 0    JARDIANCE 10 MG TABS tablet TAKE 1 TABLET(10 MG) BY MOUTH DAILY 90 tablet 2    magnesium oxide (MAG-OX) 400 MG tablet Take 1 tablet (400 mg) by mouth daily 30 tablet 0    meclizine (ANTIVERT) 25 MG tablet TAKE 1 TABLET(25 MG) BY MOUTH THREE TIMES DAILY AS NEEDED FOR DIZZINESS OR NAUSEA 45 tablet 5    naloxone (NARCAN) 4 MG/0.1ML nasal spray Spray 1 spray (4 mg) into one nostril alternating nostrils once as needed for opioid reversal every 2-3 minutes until assistance arrives 0.2 mL 0    Nutritional Supplements (ENSURE COMPLETE) LIQD Take 1 Can by mouth daily 7110 mL 4    nystatin (NYSTOP) 178904 UNIT/GM external powder APPLY TO THE AFFECTED AREA(S) 2-3 TIMES DAILY AS NEEDED 180 g 0    omeprazole (PRILOSEC) 20 MG DR capsule TAKE 1 CAPSULE(20 MG) BY MOUTH TWICE DAILY 180 capsule 1    ONETOUCH VERIO IQ test strip TEST THREE TIMES DAILY 300 strip 0    oxyCODONE IR (ROXICODONE) 10 MG tablet Take 1 tablet (10 mg) by mouth every 6 hours as needed for moderate to severe pain 120 tablet 0    potassium chloride ER (KLOR-CON M) 20 MEQ CR tablet TAKE 1 TABLET(20 MEQ) BY MOUTH DAILY 90 tablet 3    sucralfate (CARAFATE) 1 GM tablet CRUSH ONE TABLET AND MIX WITH A LLITTLE WATER AND SWALLOW FOUR TIMES DAILY 360 tablet 3    SYMBICORT 160-4.5 MCG/ACT  "Inhaler INHALE 2 PUFFS INTO THE LUNGS TWICE DAILY 10.2 g 4       Allergies   Allergen Reactions    Celebrex [Celecoxib] Rash     patient had butterfly rash - \"lupus-like\"      Latex Rash          Lab Results    Chemistry/lipid CBC Cardiac Enzymes/BNP/TSH/INR   Recent Labs   Lab Test 09/20/23  1136   CHOL 167   HDL 75   LDL 75   TRIG 83     Recent Labs   Lab Test 09/20/23  1136 07/25/22  1026 12/24/21  0756   LDL 75 53 77     Recent Labs   Lab Test 02/29/24  1326      POTASSIUM 4.2   CHLORIDE 102   CO2 29   GLC 90   BUN 13.6   CR 0.76   GFRESTIMATED 82   JOEY 8.8     Recent Labs   Lab Test 02/29/24  1326 02/23/24  0446 02/22/24  0454   CR 0.76 0.66 0.66     Recent Labs   Lab Test 02/06/24  1953 09/20/23  1136 05/25/23  0507   A1C 6.2* 6.1* 6.1*          Recent Labs   Lab Test 02/23/24  0446   WBC 6.3   HGB 12.5   HCT 40.1   MCV 97   *     Recent Labs   Lab Test 02/23/24  0446 02/22/24  0454 02/21/24  0432   HGB 12.5 11.6* 11.5*    Recent Labs   Lab Test 05/24/23  1322 05/24/23  1015 02/08/23  0759   TROPONINI 0.07 0.08 0.09     Recent Labs   Lab Test 02/19/24  1340 02/06/24  1953 06/02/23  0057 05/24/23  1015 02/08/23  0759   BNP  --   --  136* 176* 867*   NTBNPI  --  534  --   --   --    NTBNP 641  --   --   --   --      Recent Labs   Lab Test 05/24/23  1015   TSH 1.17     Recent Labs   Lab Test 02/19/24  1340 02/08/23  0759 01/18/23  0522   INR 1.04 1.34* 1.35*        Donnell Leon MD                                      "

## 2024-03-05 NOTE — PROGRESS NOTES
Per Dr. Leon.  Pt to proceed with Dual Left Bundle Pacing Pacemaker, MDT.  Ok to add on to 3-7-24.  Discussed with scheduling, arrival time @10:00.    Pre-Procedure Education    Procedure: PPM with Dr Leon on 3-7-24 with arrival time 10:00 am    Orders: Orderset for procedure verified signed/held    COVID: COVID policy- if pt develops COVID like symptoms prior to procedure, he/she would need to complete an at home with a rapid antigen COVID test 1-2 days prior to your procedure date. If COVID + pt is aware the procedure will need to be rescheduled, and to contact CV scheduling as soon as possible    Pre-Op H&P: Completed- Available in Epic- today with Dr. Leon    Education:   Contact: Reviewed with pt in Clinic today  Pre-Procedure Instruction: NPO after midnight pre procedure, Defined NPO with pt, Remove all jewelry and leave all valuables at home, Shower prior to arrival, Sedation plan/orders, Transportation requirements and arrangements post procedure, Post-procedure follow up process, Post-procedure restrictions/expectations, and Pre-procedure letter sent- letter tab  Risks:  Permanent Programable Pacemaker (PPM) Risks  1% Pneumothorax  1-2% Infection, Pocket erosion  1-2% Major bleeding, Hematoma (increased with anticoagulation therapy); 5-10% Minor bleeding  1-2% Acute subclavian vein thrombosis & 10% Chronic subclavian vein thrombosis  <1% Cardiac perforation, Cardiac tamponade, Arrythmias, Diaphragmatic stimulation  <2% % Lead dislodgment, <1% Lead fracture or Generator malfunction  < 0.1% Death  <2% Tricuspid valve dysfunction  < 5% Need for ICD system revision  If external defibrillation or CV is needed, 25% risk for superficial burn.  Risk for electromagnetic interference (ROGER), and psychological and social aspects of having an PPM.       Medication:   Instructions regarding anticoagulants: Eliquis- To continue anticoagulation uninterrupted through their procedure  Instructions regarding  antiarrhythmic medication: N/A for this type of procedure; N/A  Instructions given to pt regarding diuretics medication: Hold oral diuretics AM of procedure  Instructions given to pt regarding DM/GLP-1 medication:   DM- Hold all oral diabetic medications, short acting insulin the morning of the procedure, and take 1/2 of pts scheduled doses of long acting insulin the PM prior and/or AM of procedure  GLP-1- None  Instructions for medication, other than anticoagulants and antiarrhythmics listed above, given to pt: Take all medication AM of procedure with small sips of water     Important patient information for staff: None    3/5/2024 2:11 PM  Shanelle Dow RN

## 2024-03-05 NOTE — LETTER
3/5/2024    SAVANA SILVER MD  [address] [place] [id number]    RE: Mary Kay Tejada       Dear Colleague,     I had the pleasure of seeing Mary Kay [facility]ada in the [facility].    HEART CARE ENCOUNTER CONSULTATON NOTE      [facility]  [phone]      Assessment/Recommendations   Assessment/Plan:    Mary Kay Tejada is a very pleasant 74 year old female with past medical history of heart failure with preserved ejection fraction, severe aortic stenosis s/p TAVR on February 20, 2024, paroxysmal atrial fibrillation, hypertension, nonobstructive CAD, COPD, osteoarthritis, fibromyalgia, GERD, recurrent pleural effusions who presents today to the EP clinic.    First degree AV delay and LBBB  - Currently wearing the monitor  - discussed need for meds for her AF I.e., metoprolol and digoxin that are currently being held  - will proceed with a pacemaker implant  - indications, procedural details, possible risk and complications discussed in detail  - will plan for a pacemaker implant on 3/7/2024    2. Paroxysmal AF  - Avoiding rate and rhythm control for now given #1  - Continue anticoagulation    3.  HTN  - well controlled    Time spent: 60 minutes spent on the date of the encounter doing chart review, history and exam, documentation and further activities as noted above.    The longitudinal plan of care for the condition(s) below were addressed during this visit. Due to the added complexity in care, I will continue support in the subsequent management of this condition(s) and with the ongoing continuity of care of this condition(s).          History of Present Illness/Subjective    HPI: Mary Kay Tejada is a very pleasant 74 year old female with past medical history of heart failure with preserved ejection fraction, severe aortic stenosis s/p TAVR on February 20, 2024, paroxysmal atrial fibrillation, hypertension, nonobstructive CAD, COPD, osteoarthritis,  "fibromyalgia, GERD, recurrent pleural effusions who presents today to the EP clinic.    Cecilia underwent TAVR procedure on the 20th of February. Post procedure, she was found to a new LBBB and a 1st degree AV block as well. She was sent home on an MCOT. An emergency notification of a 4 second pause was reviewed and her digoxin was stopped and metoprolol was decreased to 12.5 mg.    She was diagnosed with AF many years ago- she is not sure how long. She was initially on warfarin and is currently on Eliquis.     No chest pain, shortness of breath, orthopnea, PND, presyncope or syncope.    Recent Echocardiogram Results (personally reviewed):    1. Technically difficult study (imaging could not be obtained from the  parasternal window).  2. Normal left ventricular size and systolic performance with a visually  estimated ejection fraction of 60%.  3. There is a bio-prosthetic aortic valve (documented 26 mm Douglas Mai 3  Resilia tissue valve).  Â  Normal aortic valve prosthesis metrics with a mean systolic gradient of 4  mmHg and a peak anterograde velocity of 1.5 m/sec. The Ao Acceleration Time is  0.14 sec.  Â  No aortic insufficiency is detected.  4. Probable normal right ventricular size and systolic performance though  right-sided structures are not clearly visualized on all views on this study.      Labs below reviewed personally     Physical Examination  Review of Systems   Vitals: /60 (BP Location: Right arm, Patient Position: Sitting, Cuff Size: Adult Regular)   Pulse 74   Resp 16   Ht 1.676 m (5' 6\")   Wt 60.8 kg (134 lb)   LMP  (LMP Unknown)   BMI 21.63 kg/m    BMI= Body mass index is 21.63 kg/m .  Wt Readings from Last 3 Encounters:   03/05/24 60.8 kg (134 lb)   02/29/24 59.4 kg (131 lb)   02/23/24 59.6 kg (131 lb 6.4 oz)       General Appearance:   no distress, normal body habitus   ENT/Mouth: membranes moist, no oral lesions or bleeding gums.      EYES:  no scleral icterus, normal " conjunctivae   Neck: no carotid bruits or thyromegaly   Chest/Lungs:   lungs are clear to auscultation, no rales or wheezing, no sternal scar, equal chest wall expansion    Cardiovascular:   Regular. Normal first and second heart sounds with no murmurs, rubs, or gallops; the carotid, radial and posterior tibial pulses are intact, no edema bilaterally    Abdomen:  no organomegaly, masses, bruits, or tenderness; bowel sounds are present   Extremities: no cyanosis or clubbing   Skin: no xanthelasma, warm.    Neurologic: normal  bilateral, no tremors     Psychiatric: alert and oriented x3, calm        Please refer above for cardiac ROS details.        Medical History  Surgical History Family History Social History   Past Medical History:   Diagnosis Date    Anemia     Aortic stenosis     Aortic valve disorder     Atrial fibrillation (H)     Atrial flutter (H)     Benign neoplasm of adenomatous polyp     large intestine     Chronic constipation     Chronic heart failure with preserved ejection fraction (H) 02/29/2024    Chronic pain syndrome     Congestive heart failure (H)     COPD (chronic obstructive pulmonary disease) (H)     Oxygen at night     Dependence on supplemental oxygen     Oxygen at noc, during the day as needed    Depression     Diabetes mellitus (H)     Dry eye syndrome     Fibromyalgia     Ganglion     left wrist    GERD (gastroesophageal reflux disease)     Hyperlipidemia     Hypertension     Hypokalemia     Infective otitis externa, unspecified     Created by Conversion     Larynx edema     Lung disease     Malignant neoplasm of vulva (H)     Created by Conversion [facility] Annotation: Apr 17 2007  8:24AM - Cammy Bui:  resection per Dr. Alfonso Mane 9/06;  Replacement Utility updated for latest IMO load    Medial epicondylitis     Onychomycosis     Osteoarthritis     Peptic ulcer     Polyneuropathy     Vulvar malignant neoplasm (H)      Past Surgical History:   Procedure  Laterality Date    BIOPSY BREAST Right     BIOPSY BREAST Right 01/28/2015    BIOPSY BREAST Right 01/28/2015    Procedure: RIGHT BREAST BIOPSY AFTER WIRE LOCALIZATION AT 0940;  Surgeon: Renée Soriano MD;  [place] [facility] [place]:     BIOPSY OF BREAST, INCISIONAL      Description: Incisional Breast Biopsy;  Recorded: 11/13/2007;  Comments: benign    COLONOSCOPY N/A 06/14/2019    Procedure: COLONOSCOPY;  Surgeon: Eduardo Mora MD;  [place] [facility] [place]  Service: Gastroenterology    CV CORONARY ANGIOGRAM N/A 02/08/2024    Procedure: CV CORONARY ANGIOGRAM;  Surgeon: Moises Valencia MD;  Location: [facility] CV    CV LEFT HEART CATH N/A 02/08/2024    Procedure: Left Heart Catheterization;  Surgeon: Moises Valencia MD;  Location: [facility] CV    CV RIGHT HEART CATH MEASUREMENTS RECORDED N/A 02/08/2024    Procedure: Right Heart Catheterization;  Surgeon: Moises Valencia MD;  Location: [facility]    CV TRANSCATHETER AORTIC VALVE REPLACEMENT-FEMORAL APPROACH N/A 02/20/2024    Procedure: Transcatheter Aortic Valve Replacement, possible cardiopulmonary bypass, possible surgical intervention;  Surgeon: Moises Valencia MD;  Location: [facility] CV    ESOPHAGOSCOPY, GASTROSCOPY, DUODENOSCOPY (EGD), COMBINED N/A 11/06/2018    Procedure: ESOPHAGOGASTRODUODENOSCOPY;  Surgeon: Lit Fernando MD;  [place] [facility] [place]  Service:     HYSTERECTOMY      JOINT REPLACEMENT Left     TKA    OR TRANSCATHETER AORTIC VALVE REPLACEMENT, FEMORAL PERCUTANEOUS APPROACH (STANDBY) N/A 02/20/2024    Procedure: OR TRANSCATHETER AORTIC VALVE REPLACEMENT, FEMORAL PERCUTANEOUS APPROACH (STANDBY);  Surgeon: Ishmael Farias MD;  Location: [facility] CV    PICC TRIPLE LUMEN PLACEMENT  01/12/2023         DC ABLATE HEART DYSRHYTHM FOCUS      Description: Catheter Ablation Atrial Fibrillation;  Recorded: 07/31/2012;  Comments: 7/24/12 PVI with Dr. Gardiner and  cyro to all 5 pulm veins and CTI fl ablation line as well.    TRANSCATHETER AORTIC-VALVE REPLACEMENT      ZZC SUPRACERV ABD HYSTERECTOMY      Description: Supracervical Hysterectomy;  Proc Date: 01/01/1985;  Comments: some cervix left!; ovaries intact; done for bleeding     Family History   Problem Relation Age of Onset    Heart Failure Mother     Cancer Other         paternal HX-laryngeal     Alcoholism Sister     No Known Problems Daughter     No Known Problems Maternal Grandmother     No Known Problems Maternal Grandfather     No Known Problems Paternal Grandmother     No Known Problems Paternal Grandfather     No Known Problems Maternal Aunt     No Known Problems Paternal Aunt     Alcoholism Sister     Alcoholism Brother     Alcoholism Father     Cancer Paternal Uncle         Gastric-Alcohol    Cancer Paternal Uncle         gastric-Alcohol    Hereditary Breast and Ovarian Cancer Syndrome No family hx of     Breast Cancer No family hx of     Colon Cancer No family hx of     Endometrial Cancer No family hx of     Ovarian Cancer No family hx of         Social History     Socioeconomic History    Marital status:      Spouse name: Not on file    Number of children: Not on file    Years of education: Not on file    Highest education level: Not on file   Occupational History    Not on file   Tobacco Use    Smoking status: Some Days     Packs/day: .25     Types: Cigarettes     Passive exposure: Never    Smokeless tobacco: Never    Tobacco comments:     seen by TTS inpatient on 3/31/22   Vaping Use    Vaping Use: Never used   Substance and Sexual Activity    Alcohol use: Yes     Comment: Alcoholic Drinks/day: very little    Drug use: No    Sexual activity: Not on file   Other Topics Concern    Not on file   Social History Narrative    Not on file     Social Determinants of Health     Financial Resource Strain: Low Risk  (12/26/2023)    Financial Resource Strain     Within the past 12 months, have you or your  "family members you live with been unable to get utilities (heat, electricity) when it was really needed?: No   Food Insecurity: Low Risk  (12/26/2023)    Food Insecurity     Within the past 12 months, did you worry that your food would run out before you got money to buy more?: No     Within the past 12 months, did the food you bought just not last and you didn t have money to get more?: No   Transportation Needs: Low Risk  (12/26/2023)    Transportation Needs     Within the past 12 months, has lack of transportation kept you from medical appointments, getting your medicines, non-medical meetings or appointments, work, or from getting things that you need?: No   Physical Activity: Not on file   Stress: Not on file   Social Connections: Not on file   Interpersonal Safety: Low Risk  (9/20/2023)    Interpersonal Safety     Do you feel physically and emotionally safe where you currently live?: Yes     Within the past 12 months, have you been hit, slapped, kicked or otherwise physically hurt by someone?: No     Within the past 12 months, have you been humiliated or emotionally abused in other ways by your partner or ex-partner?: No   Housing Stability: Low Risk  (12/26/2023)    Housing Stability     Do you have housing? : Yes     Are you worried about losing your housing?: No           Medications  Allergies   Current Outpatient Medications   Medication Sig Dispense Refill    ACCU-CHEK SOFTCLIX LANCETS lancets [ACCU-CHEK SOFTCLIX LANCETS LANCETS] TEST THREE TIMES DAILY 300 each 3    albuterol (PROAIR HFA/PROVENTIL HFA/VENTOLIN HFA) 108 (90 Base) MCG/ACT inhaler INHALE 2 PUFFS INTO THE LUNGS EVERY 4 HOURS AS NEEDED FOR WHEEZING 13.4 g 3    apixaban ANTICOAGULANT (ELIQUIS) 5 MG tablet Take 1 tablet (5 mg) by mouth 2 times daily 180 tablet 2    atorvastatin (LIPITOR) 20 MG tablet TAKE 1 TABLET(20 MG) BY MOUTH AT BEDTIME 90 tablet 3    BD ULTRA-FINE SHORT PEN NEEDLE 31 gauge x 5/16\" Ndle [BD ULTRA-FINE SHORT PEN NEEDLE 31 " "GAUGE X 5/16\" NDLE] TEST FOUR TIMES DAILY WITH MEALS AND AT BEDTIME 400 each 3    blood-glucose meter (ONETOUCH VERIO IQ METER) Misc [BLOOD-GLUCOSE METER (ONETOUCH VERIO IQ METER) MISC] Check blood sugar three times a day. 1 each 0    diaper,brief,adult,disposable (ADULT BRIEFS - LARGE) Misc [DIAPER,BRIEF,ADULT,DISPOSABLE (ADULT BRIEFS - LARGE) MISC] Use 3-4 daily as needed for incontinence 120 each 6    furosemide (LASIX) 20 MG tablet Take 1 tablet (20 mg) by mouth daily 30 tablet 1    gabapentin (NEURONTIN) 600 MG tablet TAKE 1 TABLET(600 MG) BY MOUTH THREE TIMES DAILY 270 tablet 2    generic lancets (FINGERSTIX LANCETS) [GENERIC LANCETS (FINGERSTIX LANCETS)] Dispense brand per patient's insurance at pharmacy discretion. 300 each 0    ipratropium - albuterol 0.5 mg/2.5 mg/3 mL (DUONEB) 0.5-2.5 (3) MG/3ML neb solution INHALE 1 VIAL VIA NEBULIZER EVERY 6 HOURS AS NEEDED FOR SHORTNESS OF BREATH OR WHEEZING 90 mL 0    JARDIANCE 10 MG TABS tablet TAKE 1 TABLET(10 MG) BY MOUTH DAILY 90 tablet 2    magnesium oxide (MAG-OX) 400 MG tablet Take 1 tablet (400 mg) by mouth daily 30 tablet 0    meclizine (ANTIVERT) 25 MG tablet TAKE 1 TABLET(25 MG) BY MOUTH THREE TIMES DAILY AS NEEDED FOR DIZZINESS OR NAUSEA 45 tablet 5    naloxone (NARCAN) 4 MG/0.1ML nasal spray Spray 1 spray (4 mg) into one nostril alternating nostrils once as needed for opioid reversal every 2-3 minutes until assistance arrives 0.2 mL 0    Nutritional Supplements (ENSURE COMPLETE) LIQD Take 1 Can by mouth daily 7110 mL 4    nystatin (NYSTOP) 726956 UNIT/GM external powder APPLY TO THE AFFECTED AREA(S) 2-3 TIMES DAILY AS NEEDED 180 g 0    omeprazole (PRILOSEC) 20 MG DR capsule TAKE 1 CAPSULE(20 MG) BY MOUTH TWICE DAILY 180 capsule 1    ONETOUCH VERIO IQ test strip TEST THREE TIMES DAILY 300 strip 0    oxyCODONE IR (ROXICODONE) 10 MG tablet Take 1 tablet (10 mg) by mouth every 6 hours as needed for moderate to severe pain 120 tablet 0    potassium chloride ER " "(KLOR-CON M) 20 MEQ CR tablet TAKE 1 TABLET(20 MEQ) BY MOUTH DAILY 90 tablet 3    sucralfate (CARAFATE) 1 GM tablet CRUSH ONE TABLET AND MIX WITH A LLITTLE WATER AND SWALLOW FOUR TIMES DAILY 360 tablet 3    SYMBICORT 160-4.5 MCG/ACT Inhaler INHALE 2 PUFFS INTO THE LUNGS TWICE DAILY 10.2 g 4       Allergies   Allergen Reactions    Celebrex [Celecoxib] Rash     patient had butterfly rash - \"lupus-like\"      Latex Rash          Lab Results    Chemistry/lipid CBC Cardiac Enzymes/BNP/TSH/INR   Recent Labs   Lab Test 09/20/23  1136   CHOL 167   HDL 75   LDL 75   TRIG 83     Recent Labs   Lab Test 09/20/23  1136 07/25/22  1026 12/24/21  0756   LDL 75 53 77     Recent Labs   Lab Test 02/29/24  1326      POTASSIUM 4.2   CHLORIDE 102   CO2 29   GLC 90   BUN 13.6   CR 0.76   GFRESTIMATED 82   JOEY 8.8     Recent Labs   Lab Test 02/29/24  1326 02/23/24  0446 02/22/24  0454   CR 0.76 0.66 0.66     Recent Labs   Lab Test 02/06/24  1953 09/20/23  1136 05/25/23  0507   A1C 6.2* 6.1* 6.1*          Recent Labs   Lab Test 02/23/24  0446   WBC 6.3   HGB 12.5   HCT 40.1   MCV 97   *     Recent Labs   Lab Test 02/23/24  0446 02/22/24  0454 02/21/24  0432   HGB 12.5 11.6* 11.5*    Recent Labs   Lab Test 05/24/23  1322 05/24/23  1015 02/08/23  0759   TROPONINI 0.07 0.08 0.09     Recent Labs   Lab Test 02/19/24  1340 02/06/24  1953 06/02/23  0057 05/24/23  1015 02/08/23  0759   BNP  --   --  136* 176* 867*   NTBNPI  --  534  --   --   --    NTBNP 641  --   --   --   --      Recent Labs   Lab Test 05/24/23  1015   TSH 1.17     Recent Labs   Lab Test 02/19/24  1340 02/08/23  0759 01/18/23  0522   INR 1.04 1.34* 1.35*        Donnell Leon MD                Thank you for allowing me to participate in the care of your patient.      Sincerely,     Donnell Leon MD     [facility]  cc:   Moises Valencia MD  [address]  [place] [address]      "

## 2024-03-05 NOTE — PROGRESS NOTES
H&P: PMD: []  Date: Card OV: [x]  Date: 3/5/24   Orders I [x] P  [x]      AC: None- NA Diuretics:  Lasix  DM Meds:  Jardiance  GLP-1:None     Mary Kay ARAMBULA [place] 1950 [id number]  Home:[phone] (home) Cell:[phone] (mobile)  Emergency Contact: Heriberto Tejada [phone]  PCP: Cammy Bui, [phone]      Important patient information for CSC/Cath Lab staff : None    [facility] EP Cath Lab Procedure Order     Device Implant/Revision:  Procedure: New Implant  Current Device/Device Co Needed for Procedure: Medtronic Dual HIS Pacemaker   Ordering Provider: Dr Leon  Date Ordered and Prepped: 3/5/2024 Erika Hall RN  Diagnosis:  Heart Block  Scheduling Timeframe:  Next Available  Scheduling Restrictions: None  Scheduling Contact: Please contact pt to schedule, if you are unable to schedule date within the next 24 hours please contact pt to update on scheduling process  Cardiology Follow Up Apt s/p:  New CRT/HIS Lead Device follow-up with HF CECILIA, EF < 50%, &/or has a hx/dx of HF or CM and HF/General Card @ 3mo  Pre-Procedural Testing needed: None  Anesthesia:  Conscious Sedation- CV RN to administer    [facility] EP Cath Lab Prep   H&P:  Compled by cardiology on 3/5/24 if scheduled within 30 days, pt to schedule with PMD if procedure outside of this timeframe  Pre-Procedure Labs/T&S: For SICD & MICRA Devices only schedule lab visit at [facility] lab within 3 days prior to procedure for T&S, BMP, CBC, HcG is appropriate, and INR if on warfarin. All other Devices pre-procedure lab work will be done the morning of the procedure.  Medical Records Pertinent for Procedure:  ACT/Holter Pending and Echo 2/20/24 EF 60% hx of HF  Iodinated Contrast Dye Allergies (Does not include Shellfish, Egg, and/or Iodine Allergy)- excludes ILR/SICD:None  Renal Protocol (GFR < 40ml/min, IV contrast past 2 days, EF < or = 25%)- excludes ILR/SICD: No  GLP-1 Protocol: If on Dulaglutide (Trulicity) (weekly)- Injection hold 7  "days prior to procedure  , Exenatide extended release (Bydureon bcise) (weekly)- Injection hold 7 days prior to procedure, Exenatide (Byetta) (twice daily)- Oral Tablet hold day prior and morning of procedure and for Injection hold 7 days prior to procedure, Semaglutide (Ozempic) (weekly)- Injection and Oral hold 7 days prior to procedure, Liraglutide (Victoza, Saxenda) (daily)- Injection hold day prior and morning of procedure    Allergies   Allergen Reactions    Celebrex [Celecoxib] Rash     patient had butterfly rash - \"lupus-like\"      Latex Rash       Current Outpatient Medications:     ACCU-CHEK SOFTCLIX LANCETS lancets, [ACCU-CHEK SOFTCLIX LANCETS LANCETS] TEST THREE TIMES DAILY, Disp: 300 each, Rfl: 3    albuterol (PROAIR HFA/PROVENTIL HFA/VENTOLIN HFA) 108 (90 Base) MCG/ACT inhaler, INHALE 2 PUFFS INTO THE LUNGS EVERY 4 HOURS AS NEEDED FOR WHEEZING, Disp: 13.4 g, Rfl: 3    apixaban ANTICOAGULANT (ELIQUIS) 5 MG tablet, Take 1 tablet (5 mg) by mouth 2 times daily, Disp: 180 tablet, Rfl: 2    atorvastatin (LIPITOR) 20 MG tablet, TAKE 1 TABLET(20 MG) BY MOUTH AT BEDTIME, Disp: 90 tablet, Rfl: 3    BD ULTRA-FINE SHORT PEN NEEDLE 31 gauge x 5/16\" Ndle, [BD ULTRA-FINE SHORT PEN NEEDLE 31 GAUGE X 5/16\" NDLE] TEST FOUR TIMES DAILY WITH MEALS AND AT BEDTIME, Disp: 400 each, Rfl: 3    blood-glucose meter (ONETOUCH VERIO IQ METER) Misc, [BLOOD-GLUCOSE METER (ONETOUCH VERIO IQ METER) MISC] Check blood sugar three times a day., Disp: 1 each, Rfl: 0    diaper,brief,adult,disposable (ADULT BRIEFS - LARGE) Misc, [DIAPER,BRIEF,ADULT,DISPOSABLE (ADULT BRIEFS - LARGE) MISC] Use 3-4 daily as needed for incontinence, Disp: 120 each, Rfl: 6    furosemide (LASIX) 20 MG tablet, Take 1 tablet (20 mg) by mouth daily, Disp: 30 tablet, Rfl: 1    gabapentin (NEURONTIN) 600 MG tablet, TAKE 1 TABLET(600 MG) BY MOUTH THREE TIMES DAILY, Disp: 270 tablet, Rfl: 2    generic lancets (FINGERSTIX LANCETS), [GENERIC LANCETS (FINGERSTIX LANCETS)] " Dispense brand per patient's insurance at pharmacy discretion., Disp: 300 each, Rfl: 0    ipratropium - albuterol 0.5 mg/2.5 mg/3 mL (DUONEB) 0.5-2.5 (3) MG/3ML neb solution, INHALE 1 VIAL VIA NEBULIZER EVERY 6 HOURS AS NEEDED FOR SHORTNESS OF BREATH OR WHEEZING, Disp: 90 mL, Rfl: 0    JARDIANCE 10 MG TABS tablet, TAKE 1 TABLET(10 MG) BY MOUTH DAILY, Disp: 90 tablet, Rfl: 2    magnesium oxide (MAG-OX) 400 MG tablet, Take 1 tablet (400 mg) by mouth daily, Disp: 30 tablet, Rfl: 0    meclizine (ANTIVERT) 25 MG tablet, TAKE 1 TABLET(25 MG) BY MOUTH THREE TIMES DAILY AS NEEDED FOR DIZZINESS OR NAUSEA, Disp: 45 tablet, Rfl: 5    naloxone (NARCAN) 4 MG/0.1ML nasal spray, Spray 1 spray (4 mg) into one nostril alternating nostrils once as needed for opioid reversal every 2-3 minutes until assistance arrives, Disp: 0.2 mL, Rfl: 0    Nutritional Supplements (ENSURE COMPLETE) LIQD, Take 1 Can by mouth daily, Disp: 7110 mL, Rfl: 4    nystatin (NYSTOP) 813789 UNIT/GM external powder, APPLY TO THE AFFECTED AREA(S) 2-3 TIMES DAILY AS NEEDED, Disp: 180 g, Rfl: 0    omeprazole (PRILOSEC) 20 MG DR capsule, TAKE 1 CAPSULE(20 MG) BY MOUTH TWICE DAILY, Disp: 180 capsule, Rfl: 1    ONETOUCH VERIO IQ test strip, TEST THREE TIMES DAILY, Disp: 300 strip, Rfl: 0    oxyCODONE IR (ROXICODONE) 10 MG tablet, Take 1 tablet (10 mg) by mouth every 6 hours as needed for moderate to severe pain, Disp: 120 tablet, Rfl: 0    potassium chloride ER (KLOR-CON M) 20 MEQ CR tablet, TAKE 1 TABLET(20 MEQ) BY MOUTH DAILY, Disp: 90 tablet, Rfl: 3    sucralfate (CARAFATE) 1 GM tablet, CRUSH ONE TABLET AND MIX WITH A LLITTLE WATER AND SWALLOW FOUR TIMES DAILY, Disp: 360 tablet, Rfl: 3    SYMBICORT 160-4.5 MCG/ACT Inhaler, INHALE 2 PUFFS INTO THE LUNGS TWICE DAILY, Disp: 10.2 g, Rfl: 4    Documentation Date:3/5/2024 1:54 PM  Erika Hall RN

## 2024-03-05 NOTE — PROGRESS NOTES
HEART CARE ENCOUNTER CONSULTATON NOTE      [facility]  [phone]      Assessment/Recommendations   Assessment/Plan:    Mary Kay Tejada is a very pleasant 74 year old female with past medical history of heart failure with preserved ejection fraction, severe aortic stenosis s/p TAVR on February 20, 2024, paroxysmal atrial fibrillation, hypertension, nonobstructive CAD, COPD, osteoarthritis, fibromyalgia, GERD, recurrent pleural effusions who presents today to the EP clinic.    First degree AV delay and LBBB  - Currently wearing the monitor  - discussed need for meds for her AF I.e., metoprolol and digoxin that are currently being held  - will proceed with a pacemaker implant  - indications, procedural details, possible risk and complications discussed in detail  - will plan for a pacemaker implant on 3/7/2024    2. Paroxysmal AF  - Avoiding rate and rhythm control for now given #1  - Continue anticoagulation    3.  HTN  - well controlled    Time spent: 60 minutes spent on the date of the encounter doing chart review, history and exam, documentation and further activities as noted above.    The longitudinal plan of care for the condition(s) below were addressed during this visit. Due to the added complexity in care, I will continue support in the subsequent management of this condition(s) and with the ongoing continuity of care of this condition(s).          History of Present Illness/Subjective    HPI: Mary Kay Tejada is a very pleasant 74 year old female with past medical history of heart failure with preserved ejection fraction, severe aortic stenosis s/p TAVR on February 20, 2024, paroxysmal atrial fibrillation, hypertension, nonobstructive CAD, COPD, osteoarthritis, fibromyalgia, GERD, recurrent pleural effusions who presents today to the EP clinic.    Cecilia underwent TAVR procedure on the 20th of February. Post procedure, she was found to a new LBBB and a 1st degree AV block as  "well. She was sent home on an MCOT. An emergency notification of a 4 second pause was reviewed and her digoxin was stopped and metoprolol was decreased to 12.5 mg.    She was diagnosed with AF many years ago- she is not sure how long. She was initially on warfarin and is currently on Eliquis.     No chest pain, shortness of breath, orthopnea, PND, presyncope or syncope.    Recent Echocardiogram Results (personally reviewed):    1. Technically difficult study (imaging could not be obtained from the  parasternal window).  2. Normal left ventricular size and systolic performance with a visually  estimated ejection fraction of 60%.  3. There is a bio-prosthetic aortic valve (documented 26 mm Douglas Mai 3  Resilia tissue valve).  Â  Normal aortic valve prosthesis metrics with a mean systolic gradient of 4  mmHg and a peak anterograde velocity of 1.5 m/sec. The Ao Acceleration Time is  0.14 sec.  Â  No aortic insufficiency is detected.  4. Probable normal right ventricular size and systolic performance though  right-sided structures are not clearly visualized on all views on this study.      Labs below reviewed personally     Physical Examination  Review of Systems   Vitals: /60 (BP Location: Right arm, Patient Position: Sitting, Cuff Size: Adult Regular)   Pulse 74   Resp 16   Ht 1.676 m (5' 6\")   Wt 60.8 kg (134 lb)   LMP  (LMP Unknown)   BMI 21.63 kg/m    BMI= Body mass index is 21.63 kg/m .  Wt Readings from Last 3 Encounters:   03/05/24 60.8 kg (134 lb)   02/29/24 59.4 kg (131 lb)   02/23/24 59.6 kg (131 lb 6.4 oz)       General Appearance:   no distress, normal body habitus   ENT/Mouth: membranes moist, no oral lesions or bleeding gums.      EYES:  no scleral icterus, normal conjunctivae   Neck: no carotid bruits or thyromegaly   Chest/Lungs:   lungs are clear to auscultation, no rales or wheezing, no sternal scar, equal chest wall expansion    Cardiovascular:   Regular. Normal first and second heart " sounds with no murmurs, rubs, or gallops; the carotid, radial and posterior tibial pulses are intact, no edema bilaterally    Abdomen:  no organomegaly, masses, bruits, or tenderness; bowel sounds are present   Extremities: no cyanosis or clubbing   Skin: no xanthelasma, warm.    Neurologic: normal  bilateral, no tremors     Psychiatric: alert and oriented x3, calm        Please refer above for cardiac ROS details.        Medical History  Surgical History Family History Social History   Past Medical History:   Diagnosis Date    Anemia     Aortic stenosis     Aortic valve disorder     Atrial fibrillation (H)     Atrial flutter (H)     Benign neoplasm of adenomatous polyp     large intestine     Chronic constipation     Chronic heart failure with preserved ejection fraction (H) 02/29/2024    Chronic pain syndrome     Congestive heart failure (H)     COPD (chronic obstructive pulmonary disease) (H)     Oxygen at night     Dependence on supplemental oxygen     Oxygen at noc, during the day as needed    Depression     Diabetes mellitus (H)     Dry eye syndrome     Fibromyalgia     Ganglion     left wrist    GERD (gastroesophageal reflux disease)     Hyperlipidemia     Hypertension     Hypokalemia     Infective otitis externa, unspecified     Created by Conversion     Larynx edema     Lung disease     Malignant neoplasm of vulva (H)     Created by Conversion [facility] Annotation: Apr 17 2007  8:24AM - Cammy Bui:  resection per Dr. Alfonso Mane 9/06;  Replacement Utility updated for latest IMO load    Medial epicondylitis     Onychomycosis     Osteoarthritis     Peptic ulcer     Polyneuropathy     Vulvar malignant neoplasm (H)      Past Surgical History:   Procedure Laterality Date    BIOPSY BREAST Right     BIOPSY BREAST Right 01/28/2015    BIOPSY BREAST Right 01/28/2015    Procedure: RIGHT BREAST BIOPSY AFTER WIRE LOCALIZATION AT 0940;  Surgeon: Renée Soriano MD;  Location: [facility];   Service:     BIOPSY OF BREAST, INCISIONAL      Description: Incisional Breast Biopsy;  Recorded: 11/13/2007;  Comments: benign    COLONOSCOPY N/A 06/14/2019    Procedure: COLONOSCOPY;  Surgeon: Eduardo Mora MD;  Location: [facility];  Service: Gastroenterology    CV CORONARY ANGIOGRAM N/A 02/08/2024    Procedure: CV CORONARY ANGIOGRAM;  Surgeon: Moises Valencia MD;  Location: [facility] CV    CV LEFT HEART CATH N/A 02/08/2024    Procedure: Left Heart Catheterization;  Surgeon: Moises Valencia MD;  Location: [facility]    CV RIGHT HEART CATH MEASUREMENTS RECORDED N/A 02/08/2024    Procedure: Right Heart Catheterization;  Surgeon: Moises Valencia MD;  Location: [facility]    CV TRANSCATHETER AORTIC VALVE REPLACEMENT-FEMORAL APPROACH N/A 02/20/2024    Procedure: Transcatheter Aortic Valve Replacement, possible cardiopulmonary bypass, possible surgical intervention;  Surgeon: Moises Valencia MD;  Location: [facility] CV    ESOPHAGOSCOPY, GASTROSCOPY, DUODENOSCOPY (EGD), COMBINED N/A 11/06/2018    Procedure: ESOPHAGOGASTRODUODENOSCOPY;  Surgeon: Lit Fernando MD;  Location: [facility];  Service:     HYSTERECTOMY      JOINT REPLACEMENT Left     TKA    OR TRANSCATHETER AORTIC VALVE REPLACEMENT, FEMORAL PERCUTANEOUS APPROACH (STANDBY) N/A 02/20/2024    Procedure: OR TRANSCATHETER AORTIC VALVE REPLACEMENT, FEMORAL PERCUTANEOUS APPROACH (STANDBY);  Surgeon: Ishmael Farias MD;  Location: [facility] CV    PICC TRIPLE LUMEN PLACEMENT  01/12/2023         MD ABLATE HEART DYSRHYTHM FOCUS      Description: Catheter Ablation Atrial Fibrillation;  Recorded: 07/31/2012;  Comments: 7/24/12 PVI with Dr. Gardiner and nilay to all 5 pulm veins and CTI fl ablation line as well.    TRANSCATHETER AORTIC-VALVE REPLACEMENT      ZZC SUPRACERV ABD HYSTERECTOMY      Description: Supracervical Hysterectomy;  Proc Date: 01/01/1985;  Comments: some cervix left!;  ovaries intact; done for bleeding     Family History   Problem Relation Age of Onset    Heart Failure Mother     Cancer Other         paternal HX-laryngeal     Alcoholism Sister     No Known Problems Daughter     No Known Problems Maternal Grandmother     No Known Problems Maternal Grandfather     No Known Problems Paternal Grandmother     No Known Problems Paternal Grandfather     No Known Problems Maternal Aunt     No Known Problems Paternal Aunt     Alcoholism Sister     Alcoholism Brother     Alcoholism Father     Cancer Paternal Uncle         Gastric-Alcohol    Cancer Paternal Uncle         gastric-Alcohol    Hereditary Breast and Ovarian Cancer Syndrome No family hx of     Breast Cancer No family hx of     Colon Cancer No family hx of     Endometrial Cancer No family hx of     Ovarian Cancer No family hx of         Social History     Socioeconomic History    Marital status:      Spouse name: Not on file    Number of children: Not on file    Years of education: Not on file    Highest education level: Not on file   Occupational History    Not on file   Tobacco Use    Smoking status: Some Days     Packs/day: .25     Types: Cigarettes     Passive exposure: Never    Smokeless tobacco: Never    Tobacco comments:     seen by TTS inpatient on 3/31/22   Vaping Use    Vaping Use: Never used   Substance and Sexual Activity    Alcohol use: Yes     Comment: Alcoholic Drinks/day: very little    Drug use: No    Sexual activity: Not on file   Other Topics Concern    Not on file   Social History Narrative    Not on file     Social Determinants of Health     Financial Resource Strain: Low Risk  (12/26/2023)    Financial Resource Strain     Within the past 12 months, have you or your family members you live with been unable to get utilities (heat, electricity) when it was really needed?: No   Food Insecurity: Low Risk  (12/26/2023)    Food Insecurity     Within the past 12 months, did you worry that your food would run  "out before you got money to buy more?: No     Within the past 12 months, did the food you bought just not last and you didn t have money to get more?: No   Transportation Needs: Low Risk  (12/26/2023)    Transportation Needs     Within the past 12 months, has lack of transportation kept you from medical appointments, getting your medicines, non-medical meetings or appointments, work, or from getting things that you need?: No   Physical Activity: Not on file   Stress: Not on file   Social Connections: Not on file   Interpersonal Safety: Low Risk  (9/20/2023)    Interpersonal Safety     Do you feel physically and emotionally safe where you currently live?: Yes     Within the past 12 months, have you been hit, slapped, kicked or otherwise physically hurt by someone?: No     Within the past 12 months, have you been humiliated or emotionally abused in other ways by your partner or ex-partner?: No   Housing Stability: Low Risk  (12/26/2023)    Housing Stability     Do you have housing? : Yes     Are you worried about losing your housing?: No           Medications  Allergies   Current Outpatient Medications   Medication Sig Dispense Refill    ACCU-CHEK SOFTCLIX LANCETS lancets [ACCU-CHEK SOFTCLIX LANCETS LANCETS] TEST THREE TIMES DAILY 300 each 3    albuterol (PROAIR HFA/PROVENTIL HFA/VENTOLIN HFA) 108 (90 Base) MCG/ACT inhaler INHALE 2 PUFFS INTO THE LUNGS EVERY 4 HOURS AS NEEDED FOR WHEEZING 13.4 g 3    apixaban ANTICOAGULANT (ELIQUIS) 5 MG tablet Take 1 tablet (5 mg) by mouth 2 times daily 180 tablet 2    atorvastatin (LIPITOR) 20 MG tablet TAKE 1 TABLET(20 MG) BY MOUTH AT BEDTIME 90 tablet 3    BD ULTRA-FINE SHORT PEN NEEDLE 31 gauge x 5/16\" Ndle [BD ULTRA-FINE SHORT PEN NEEDLE 31 GAUGE X 5/16\" NDLE] TEST FOUR TIMES DAILY WITH MEALS AND AT BEDTIME 400 each 3    blood-glucose meter (ONETOUCH VERIO IQ METER) Misc [BLOOD-GLUCOSE METER (ONETOUCH VERIO IQ METER) MISC] Check blood sugar three times a day. 1 each 0    " diaper,brief,adult,disposable (ADULT BRIEFS - LARGE) Misc [DIAPER,BRIEF,ADULT,DISPOSABLE (ADULT BRIEFS - LARGE) MISC] Use 3-4 daily as needed for incontinence 120 each 6    furosemide (LASIX) 20 MG tablet Take 1 tablet (20 mg) by mouth daily 30 tablet 1    gabapentin (NEURONTIN) 600 MG tablet TAKE 1 TABLET(600 MG) BY MOUTH THREE TIMES DAILY 270 tablet 2    generic lancets (FINGERSTIX LANCETS) [GENERIC LANCETS (FINGERSTIX LANCETS)] Dispense brand per patient's insurance at pharmacy discretion. 300 each 0    ipratropium - albuterol 0.5 mg/2.5 mg/3 mL (DUONEB) 0.5-2.5 (3) MG/3ML neb solution INHALE 1 VIAL VIA NEBULIZER EVERY 6 HOURS AS NEEDED FOR SHORTNESS OF BREATH OR WHEEZING 90 mL 0    JARDIANCE 10 MG TABS tablet TAKE 1 TABLET(10 MG) BY MOUTH DAILY 90 tablet 2    magnesium oxide (MAG-OX) 400 MG tablet Take 1 tablet (400 mg) by mouth daily 30 tablet 0    meclizine (ANTIVERT) 25 MG tablet TAKE 1 TABLET(25 MG) BY MOUTH THREE TIMES DAILY AS NEEDED FOR DIZZINESS OR NAUSEA 45 tablet 5    naloxone (NARCAN) 4 MG/0.1ML nasal spray Spray 1 spray (4 mg) into one nostril alternating nostrils once as needed for opioid reversal every 2-3 minutes until assistance arrives 0.2 mL 0    Nutritional Supplements (ENSURE COMPLETE) LIQD Take 1 Can by mouth daily 7110 mL 4    nystatin (NYSTOP) 020622 UNIT/GM external powder APPLY TO THE AFFECTED AREA(S) 2-3 TIMES DAILY AS NEEDED 180 g 0    omeprazole (PRILOSEC) 20 MG DR capsule TAKE 1 CAPSULE(20 MG) BY MOUTH TWICE DAILY 180 capsule 1    ONETOUCH VERIO IQ test strip TEST THREE TIMES DAILY 300 strip 0    oxyCODONE IR (ROXICODONE) 10 MG tablet Take 1 tablet (10 mg) by mouth every 6 hours as needed for moderate to severe pain 120 tablet 0    potassium chloride ER (KLOR-CON M) 20 MEQ CR tablet TAKE 1 TABLET(20 MEQ) BY MOUTH DAILY 90 tablet 3    sucralfate (CARAFATE) 1 GM tablet CRUSH ONE TABLET AND MIX WITH A LLITTLE WATER AND SWALLOW FOUR TIMES DAILY 360 tablet 3    SYMBICORT 160-4.5 MCG/ACT  "Inhaler INHALE 2 PUFFS INTO THE LUNGS TWICE DAILY 10.2 g 4       Allergies   Allergen Reactions    Celebrex [Celecoxib] Rash     patient had butterfly rash - \"lupus-like\"      Latex Rash          Lab Results    Chemistry/lipid CBC Cardiac Enzymes/BNP/TSH/INR   Recent Labs   Lab Test 09/20/23  1136   CHOL 167   HDL 75   LDL 75   TRIG 83     Recent Labs   Lab Test 09/20/23  1136 07/25/22  1026 12/24/21  0756   LDL 75 53 77     Recent Labs   Lab Test 02/29/24  1326      POTASSIUM 4.2   CHLORIDE 102   CO2 29   GLC 90   BUN 13.6   CR 0.76   GFRESTIMATED 82   JOEY 8.8     Recent Labs   Lab Test 02/29/24  1326 02/23/24  0446 02/22/24  0454   CR 0.76 0.66 0.66     Recent Labs   Lab Test 02/06/24  1953 09/20/23  1136 05/25/23  0507   A1C 6.2* 6.1* 6.1*          Recent Labs   Lab Test 02/23/24  0446   WBC 6.3   HGB 12.5   HCT 40.1   MCV 97   *     Recent Labs   Lab Test 02/23/24  0446 02/22/24  0454 02/21/24  0432   HGB 12.5 11.6* 11.5*    Recent Labs   Lab Test 05/24/23  1322 05/24/23  1015 02/08/23  0759   TROPONINI 0.07 0.08 0.09     Recent Labs   Lab Test 02/19/24  1340 02/06/24  1953 06/02/23  0057 05/24/23  1015 02/08/23  0759   BNP  --   --  136* 176* 867*   NTBNPI  --  534  --   --   --    NTBNP 641  --   --   --   --      Recent Labs   Lab Test 05/24/23  1015   TSH 1.17     Recent Labs   Lab Test 02/19/24  1340 02/08/23  0759 01/18/23  0522   INR 1.04 1.34* 1.35*        Donnell Leon MD                                      "

## 2024-03-07 ENCOUNTER — APPOINTMENT (OUTPATIENT)
Dept: RADIOLOGY | Facility: HOSPITAL | Age: 74
End: 2024-03-07
Attending: INTERNAL MEDICINE
Payer: COMMERCIAL

## 2024-03-07 ENCOUNTER — HOSPITAL ENCOUNTER (OUTPATIENT)
Facility: HOSPITAL | Age: 74
Discharge: HOME | End: 2024-03-07
Attending: INTERNAL MEDICINE | Admitting: INTERNAL MEDICINE
Payer: COMMERCIAL

## 2024-03-07 VITALS
TEMPERATURE: 97.8 F | OXYGEN SATURATION: 94 % | SYSTOLIC BLOOD PRESSURE: 106 MMHG | DIASTOLIC BLOOD PRESSURE: 56 MMHG | RESPIRATION RATE: 18 BRPM | HEART RATE: 66 BPM

## 2024-03-07 DIAGNOSIS — I44.2 COMPLETE ATRIOVENTRICULAR BLOCK (H): Primary | ICD-10-CM

## 2024-03-07 DIAGNOSIS — R00.1 SINUS BRADYCARDIA: ICD-10-CM

## 2024-03-07 DIAGNOSIS — I50.32 CHRONIC HEART FAILURE WITH PRESERVED EJECTION FRACTION (H): ICD-10-CM

## 2024-03-07 DIAGNOSIS — I48.11 LONGSTANDING PERSISTENT ATRIAL FIBRILLATION (H): ICD-10-CM

## 2024-03-07 DIAGNOSIS — I45.5 SINUS PAUSE: ICD-10-CM

## 2024-03-07 DIAGNOSIS — I49.5 SINUS NODE DYSFUNCTION (H): ICD-10-CM

## 2024-03-07 DIAGNOSIS — R55 SYNCOPE: ICD-10-CM

## 2024-03-07 LAB
ANION GAP SERPL CALCULATED.3IONS-SCNC: 3 MMOL/L (ref 7–15)
BUN SERPL-MCNC: 12.1 MG/DL (ref 8–23)
CALCIUM SERPL-MCNC: 9.2 MG/DL (ref 8.8–10.2)
CHLORIDE SERPL-SCNC: 106 MMOL/L (ref 98–107)
CREAT SERPL-MCNC: 0.64 MG/DL (ref 0.51–0.95)
DEPRECATED HCO3 PLAS-SCNC: 35 MMOL/L (ref 22–29)
EGFRCR SERPLBLD CKD-EPI 2021: >90 ML/MIN/1.73M2
ERYTHROCYTE [DISTWIDTH] IN BLOOD BY AUTOMATED COUNT: 14.6 % (ref 10–15)
GLUCOSE SERPL-MCNC: 83 MG/DL (ref 70–99)
HCT VFR BLD AUTO: 37.3 % (ref 35–47)
HGB BLD-MCNC: 11.8 G/DL (ref 11.7–15.7)
MCH RBC QN AUTO: 30.2 PG (ref 26.5–33)
MCHC RBC AUTO-ENTMCNC: 31.6 G/DL (ref 31.5–36.5)
MCV RBC AUTO: 95 FL (ref 78–100)
PLATELET # BLD AUTO: 115 10E3/UL (ref 150–450)
POTASSIUM SERPL-SCNC: 4.3 MMOL/L (ref 3.4–5.3)
RBC # BLD AUTO: 3.91 10E6/UL (ref 3.8–5.2)
SODIUM SERPL-SCNC: 144 MMOL/L (ref 135–145)
WBC # BLD AUTO: 7 10E3/UL (ref 4–11)

## 2024-03-07 PROCEDURE — 99152 MOD SED SAME PHYS/QHP 5/>YRS: CPT | Performed by: INTERNAL MEDICINE

## 2024-03-07 PROCEDURE — C1730 CATH, EP, 19 OR FEW ELECT: HCPCS | Performed by: INTERNAL MEDICINE

## 2024-03-07 PROCEDURE — C1898 LEAD, PMKR, OTHER THAN TRANS: HCPCS | Performed by: INTERNAL MEDICINE

## 2024-03-07 PROCEDURE — 85027 COMPLETE CBC AUTOMATED: CPT | Performed by: INTERNAL MEDICINE

## 2024-03-07 PROCEDURE — C1894 INTRO/SHEATH, NON-LASER: HCPCS | Performed by: INTERNAL MEDICINE

## 2024-03-07 PROCEDURE — 33208 INSRT HEART PM ATRIAL & VENT: CPT | Mod: KX | Performed by: INTERNAL MEDICINE

## 2024-03-07 PROCEDURE — 93010 ELECTROCARDIOGRAM REPORT: CPT | Performed by: INTERNAL MEDICINE

## 2024-03-07 PROCEDURE — 99153 MOD SED SAME PHYS/QHP EA: CPT | Performed by: INTERNAL MEDICINE

## 2024-03-07 PROCEDURE — 250N000009 HC RX 250: Performed by: INTERNAL MEDICINE

## 2024-03-07 PROCEDURE — 999N000065 XR CHEST 2 VIEWS

## 2024-03-07 PROCEDURE — 272N000001 HC OR GENERAL SUPPLY STERILE: Performed by: INTERNAL MEDICINE

## 2024-03-07 PROCEDURE — 93005 ELECTROCARDIOGRAM TRACING: CPT

## 2024-03-07 PROCEDURE — C1887 CATHETER, GUIDING: HCPCS | Performed by: INTERNAL MEDICINE

## 2024-03-07 PROCEDURE — 36415 COLL VENOUS BLD VENIPUNCTURE: CPT | Performed by: INTERNAL MEDICINE

## 2024-03-07 PROCEDURE — 258N000003 HC RX IP 258 OP 636: Performed by: INTERNAL MEDICINE

## 2024-03-07 PROCEDURE — C1785 PMKR, DUAL, RATE-RESP: HCPCS | Performed by: INTERNAL MEDICINE

## 2024-03-07 PROCEDURE — 250N000011 HC RX IP 250 OP 636: Performed by: INTERNAL MEDICINE

## 2024-03-07 PROCEDURE — 80048 BASIC METABOLIC PNL TOTAL CA: CPT | Performed by: INTERNAL MEDICINE

## 2024-03-07 PROCEDURE — 99152 MOD SED SAME PHYS/QHP 5/>YRS: CPT | Mod: 59 | Performed by: INTERNAL MEDICINE

## 2024-03-07 DEVICE — LEAD PACEMAKER SELECTSECURE 69CM MD  383069: Type: IMPLANTABLE DEVICE | Site: HEART | Status: FUNCTIONAL

## 2024-03-07 DEVICE — PACEMAKER AZURE MRI XT DR: Type: IMPLANTABLE DEVICE | Site: CHEST  WALL | Status: FUNCTIONAL

## 2024-03-07 DEVICE — IMP LEAD PACING BIPOLAR CAPSUREFIX NOVUS 52CM 5076-52: Type: IMPLANTABLE DEVICE | Site: HEART | Status: FUNCTIONAL

## 2024-03-07 RX ORDER — CEFAZOLIN SODIUM 2 G/100ML
INJECTION, SOLUTION INTRAVENOUS CONTINUOUS PRN
Status: COMPLETED | OUTPATIENT
Start: 2024-03-07 | End: 2024-03-07

## 2024-03-07 RX ORDER — ACETAMINOPHEN 325 MG/1
650 TABLET ORAL EVERY 4 HOURS PRN
Status: DISCONTINUED | OUTPATIENT
Start: 2024-03-07 | End: 2024-03-07 | Stop reason: HOSPADM

## 2024-03-07 RX ORDER — NICOTINE POLACRILEX 4 MG
15-30 LOZENGE BUCCAL
Status: DISCONTINUED | OUTPATIENT
Start: 2024-03-07 | End: 2024-03-07 | Stop reason: HOSPADM

## 2024-03-07 RX ORDER — CEFAZOLIN SODIUM/WATER 2 G/20 ML
2 SYRINGE (ML) INTRAVENOUS
Status: DISCONTINUED | OUTPATIENT
Start: 2024-03-07 | End: 2024-03-07 | Stop reason: HOSPADM

## 2024-03-07 RX ORDER — LIDOCAINE 40 MG/G
CREAM TOPICAL
Status: DISCONTINUED | OUTPATIENT
Start: 2024-03-07 | End: 2024-03-07 | Stop reason: HOSPADM

## 2024-03-07 RX ORDER — DEXTROSE MONOHYDRATE 25 G/50ML
25-50 INJECTION, SOLUTION INTRAVENOUS
Status: DISCONTINUED | OUTPATIENT
Start: 2024-03-07 | End: 2024-03-07 | Stop reason: HOSPADM

## 2024-03-07 RX ORDER — DEXMEDETOMIDINE HYDROCHLORIDE 4 UG/ML
INJECTION, SOLUTION INTRAVENOUS CONTINUOUS PRN
Status: DISCONTINUED | OUTPATIENT
Start: 2024-03-07 | End: 2024-03-07 | Stop reason: HOSPADM

## 2024-03-07 RX ORDER — DEXMEDETOMIDINE HYDROCHLORIDE 4 UG/ML
.1-1.5 INJECTION, SOLUTION INTRAVENOUS CONTINUOUS
Status: DISCONTINUED | OUTPATIENT
Start: 2024-03-07 | End: 2024-03-07 | Stop reason: HOSPADM

## 2024-03-07 RX ORDER — SODIUM CHLORIDE 9 MG/ML
100 INJECTION, SOLUTION INTRAVENOUS CONTINUOUS
Status: DISCONTINUED | OUTPATIENT
Start: 2024-03-07 | End: 2024-03-07 | Stop reason: HOSPADM

## 2024-03-07 RX ORDER — METOPROLOL TARTRATE 25 MG/1
25 TABLET, FILM COATED ORAL 2 TIMES DAILY
Qty: 60 TABLET | Refills: 3 | Status: SHIPPED | OUTPATIENT
Start: 2024-03-07 | End: 2024-07-23

## 2024-03-07 RX ORDER — FENTANYL CITRATE 50 UG/ML
25 INJECTION, SOLUTION INTRAMUSCULAR; INTRAVENOUS
Status: DISCONTINUED | OUTPATIENT
Start: 2024-03-07 | End: 2024-03-07 | Stop reason: HOSPADM

## 2024-03-07 RX ORDER — FENTANYL CITRATE 50 UG/ML
INJECTION, SOLUTION INTRAMUSCULAR; INTRAVENOUS
Status: DISCONTINUED | OUTPATIENT
Start: 2024-03-07 | End: 2024-03-07 | Stop reason: HOSPADM

## 2024-03-07 RX ORDER — ONDANSETRON 2 MG/ML
INJECTION INTRAMUSCULAR; INTRAVENOUS
Status: DISCONTINUED | OUTPATIENT
Start: 2024-03-07 | End: 2024-03-07 | Stop reason: HOSPADM

## 2024-03-07 RX ADMIN — SODIUM CHLORIDE 100 ML/HR: 9 INJECTION, SOLUTION INTRAVENOUS at 11:12

## 2024-03-07 RX ADMIN — PROCHLORPERAZINE EDISYLATE 5 MG: 5 INJECTION INTRAMUSCULAR; INTRAVENOUS at 18:38

## 2024-03-07 ASSESSMENT — ACTIVITIES OF DAILY LIVING (ADL)
ADLS_ACUITY_SCORE: 40
ADLS_ACUITY_SCORE: 38
ADLS_ACUITY_SCORE: 40

## 2024-03-07 NOTE — INTERVAL H&P NOTE
I have reviewed the surgical (or preoperative) H&P that is linked to this encounter, and examined the patient. There are no significant changes    Clinical Conditions Present on Arrival:  Clinically Significant Risk Factors Present on Admission              # Hypoalbuminemia: Lowest albumin = 3 g/dL in the past 30 days , will monitor as appropriate   # Drug Induced Coagulation Defect: home medication list includes an anticoagulant medication

## 2024-03-07 NOTE — DISCHARGE INSTRUCTIONS
[facility]  Cardiac Electrophysiology  [address]  [place] [address]   Office: [phone]  Fax: [phone]     Cardiac Electrophysiology - Post Pacemaker Implantation Discharge Instructions      PROCEDURE   Pacemaker Implant         MEDICATION INSTRUCTIONS   Continue taking all home meds.         WOUND CARE   Leave the large overlying dressing in place until 2 days after discharge - this dressing can thereafter be removed by gently pulling off.  Underneath the large dressing will be steri-strips. DO NOT REMOVE these strips; please leave these in place until you are seen in clinic.  DO NOT COVER the site with any bandages or dressings. The site should be kept dry for 7 days - please avoid traditional showers or baths during this time.  Keep the site clean and dry.  No swimming, sauna, or hot tub use until incision is completely healed.         ACTIVITY   It takes approximately 4 weeks for your pacemaker/defibrillator leads to settle into place. During this time use extra precaution to avoid dislodging the leads.  Avoid the following:  Raising your arm over your head or stretching behind your back (no hyperflexion)  Pushing or pulling (mowing the lawn, vacuuming)  Lifting anything heavier than 10 pounds or a gallon of milk  Sudden or extreme motions  Be physically active every day.  Moving your arm is important       REMOTE MONITORING   You will receive a remote transmission station to monitor your device from home  Please set the transmitter up as soon as you get home from the hospital.  Directions are included in the box, and you may call our office or the company technical support line if you need assistance connecting your transmitter.  Please send a transmission the day after you go home  Automated transmissions will be sent every 3 months or sooner if device issues are detected.  You may manually send in transmissions if you are having arrhythmia symptoms or think  there may be a problem with your device.  Please call our office at [phone] if you send in a transmission off-schedule         WHAT TO WATCH OUT FOR   Contact our office or seek emergency care for any of the following:  Drainage, bleeding or oozing from the site  Redness, increased swelling, or warmth around the device site  Pain not treated with prescribed pain medication  Temperature greater than 100.4F  Chest pain, shortness of breath, dizziness/fainting         FOLLOW-UP   Our office will coordinate a follow-up visit visit in clinic for a device interrogation and an incision check 7-14 days after your procedure.   Please contact us at [phone] with any issues during your recovery.

## 2024-03-07 NOTE — Clinical Note
Started Cefazolin 2g IVP when patient in room, complaining of pain at IV site, visibly swelling as fluids pushed. Will attempt IV with  ultrasound guidance.

## 2024-03-07 NOTE — Clinical Note
Prepped: left chest and left neck. Prepped with: DuraPrep. The patient was draped. .Pre-procedure site marking:Insertion site not predetermined

## 2024-03-07 NOTE — CARE PLAN
"Pt admitted to [facility] for a pacemaker, denies any pain, wears home O2 at 2.5 L/NC, does not know her medications and doses but says that she was told \"not to take her blood thinner anymore\" and does not know when she had her last dose, did not bring a med list with her, is worried about her left arm because she sleeps on her left side with her arm above her head, GILMA Leonard notified and will speak with Dr. Leon  about solutions, will monitor closely.  "

## 2024-03-07 NOTE — PRE-PROCEDURE
GENERAL PRE-PROCEDURE:   Procedure:  PPM implant for indication of SND/SSS  Date/Time:  3/7/2024 11:03 AM    Written consent obtained?: Yes    Risks and benefits: Risks, benefits and alternatives were discussed    Consent given by:  Patient  Patient states understanding of procedure being performed: Yes    Patient's understanding of procedure matches consent: Yes    Procedure consent matches procedure scheduled: Yes    Expected level of sedation:  Moderate  Appropriately NPO:  Yes  ASA Class:  3 (SND/SSS, severe AS; s/p TAVR, 1st degree AVB, LBBB, HTN, tobacco use disorder, DM Type II, COPD, GERD)  Mallampati  :  Grade 1- soft palate, uvula, tonsillar pillars, and posterior pharyngeal wall visible  Lungs:  Lungs clear with good breath sounds bilaterally  Heart:  Normal heart sounds and rate  History & Physical reviewed:  History and physical reviewed and updates made (see comment)  H&P Comments:  History & Physical reviewed:  History and physical reviewed and updates made (see comment)  H&P Comments:  Clinically Significant Risk Factors Present on Admission     Cardiovascular : SND/SSS, Severe AS; s/p TAVR, 1st degree AVB, AF/AFL; on NOAC, LBBB, CAD, HTN, DM Type II     Fluid & Electrolyte Disorders : Not present on admission     Gastroenterology : GERD     Hematology/Oncology : Not present on admission     Nephrology : Not present on admission     Neurology : Not present on admission     Pulmonology : Tobacco use disorder, COPD     Systemic : Not present on admission    Statement of review:  I have reviewed the lab findings, diagnostic data, medications, and the plan for sedation    Statement of review:  I have reviewed the lab findings, diagnostic data, medications, and the plan for sedation

## 2024-03-08 ENCOUNTER — DOCUMENTATION ONLY (OUTPATIENT)
Dept: CARDIOLOGY | Facility: CLINIC | Age: 74
End: 2024-03-08
Payer: COMMERCIAL

## 2024-03-08 NOTE — PROGRESS NOTES
Donnell Leon MD  [facility] [place] - [facility] team,     I implanted a dual chamber pacemaker with Left bundle lead in Ms Tejada on 3/7. She had intermittent AV block post TAVR. I put on her MVP mode. During her one week follow up if her AV block progresses, please turn her MVP off.  Thanks,  KA    Above noted, Paceart/Appt notes updated.   Radha Hobbs RN

## 2024-03-08 NOTE — PROGRESS NOTES
Patient was kept comfortable during post-procedure stay.VSS. Denies pain. Left chest incision site remains dry & free from signs of bleeding. Initially was nauseous upon arrival from the cathlab even after a dose of Zofran. Dr. Leon informed and ordered a dose of compazine. Patient stated nausea is better after this. Tolerated crackers and fluids. Reported to be using 2 to 3 liters of oxygen at home at all times, except when moving around (she also does not have a portable o2 at home so she takes this off when up and about). Patient's o2 sat was on upper 80s to lower 90s at 2 to 3 LPM. Patient encouraged to do cough and deep breathing and this helped increase her o2 sat above 90s. Patient's O2 saturations stayed around 93% at 2 to 3 liters about an hour before discharge as she becomes more alert from the sedation. Also complained of feeling weak but was able to ambulate in the unit hallway. Patient has generalized weakness as her baseline. She and her significant other was reassured that what she is feeling is an expected side effect of sedation and that this will get better once she is well rested. Again reiterated the importance of portable o2 that she can use when up and moving. Appointments already made & included in AVS. Dr. Leon was able to speak with patient and significant other post procedure. Post-op instructions reviewed and packet given. Able to ask questions. Verbalized no concerns. Belongings returned. Discharged in stable condition.

## 2024-03-09 LAB
ATRIAL RATE - MUSE: 60 BPM
DIASTOLIC BLOOD PRESSURE - MUSE: NORMAL MMHG
INTERPRETATION ECG - MUSE: NORMAL
P AXIS - MUSE: 83 DEGREES
PR INTERVAL - MUSE: 258 MS
QRS DURATION - MUSE: 116 MS
QT - MUSE: 436 MS
QTC - MUSE: 436 MS
R AXIS - MUSE: -44 DEGREES
SYSTOLIC BLOOD PRESSURE - MUSE: NORMAL MMHG
T AXIS - MUSE: 92 DEGREES
VENTRICULAR RATE- MUSE: 60 BPM

## 2024-03-11 ENCOUNTER — TELEPHONE (OUTPATIENT)
Dept: CARDIOLOGY | Facility: CLINIC | Age: 74
End: 2024-03-11
Payer: COMMERCIAL

## 2024-03-11 PROCEDURE — 93228 REMOTE 30 DAY ECG REV/REPORT: CPT | Performed by: INTERNAL MEDICINE

## 2024-03-11 NOTE — TELEPHONE ENCOUNTER
[facility] Call Center    Phone Message    May a detailed message be left on voicemail: yes     Reason for Call: Other: Mary Kay had a pacemaker put in last Thursday, 3/7, and was attempting to take the dressing off as instructed, but it was not coming off easy and was pulling at the strings that are not due to come off yet.  Please call Mary Kay back to further advise.     Action Taken: Other: Cardiology    Travel Screening: Not Applicable    Thank you!  [facility]

## 2024-03-11 NOTE — TELEPHONE ENCOUNTER
Call placed to patient to review concerns. States her  is trying to remove top bandage but it is stuck to another one underneath it and cannot get it off. Reviewed with them that the whole top bandage can come off and just need to leave steri-strips in place. The steri-strips should be the only thing left on after removing bandages. Patient/ verbalized understanding., they were misunderstood, thought they had to separate the top bandages from each other.     Reviewed that just steristrips are to be left in place and everything else can be removed. We will remove steristrips and clean up incision at post-op appt 3/18/24. Patient verbalized understanding and  was able to remove everything easily after discussion.     Radha Hobbs RN

## 2024-03-11 NOTE — PROGRESS NOTES
HEART CARE CONSULTATON NOTE        Assessment/Recommendations   Assessment:  Severe aortic stenosis now status post TAVR with a 26 mm Douglas CHRISTELLE 3 Ultra RESILIA   Chronic diastolic heart failure, NYHA class III -currently compensated.  LVEDP postprocedure was 10.  Hypertension - controlled   Paroxysmal atrial fibrillation -status post PVI in 2012.  Currently in normal sinus rhythm  Mild to moderate nonobstructive CAD pre-TAVR coronary angiogram   Type 2 diabetes mellitus -most recent hemoglobin A1c was 6.2  Chronic obstructive pulmonary disease -currently smokes 4 to 5 cigarettes daily.  Not interested in smoking cessation at this time. Normally only wears 3 L of supplemental oxygen at night  Recurrent right pleural effusion - s/p thoracentesis today with 1 L drained   Left leg wound - Omnicef per ID; continue with wound care    Plan:   Continue aspirin until able to resume Eliquis.  Given thoracentesis today plan to resume Eliquis tomorrow morning as long as not having bleeding issues   Continue Lasix 10 mg daily, metoprolol 25 mg twice daily, digoxin 125 mcg daily  Continue PTA Lipitor  Continue cardiac monitoring; plan to send home with MCOT monitor upon discharge   Cardiac rehab  Continue Symbicort, Duoneb. Repeat CXR in morning. Wean O2     Clinically Significant Risk Factors              # Hypoalbuminemia: Lowest albumin = 3 g/dL at 2/20/2024  9:11 AM, will monitor as appropriate       # Hypertension: Noted on problem list                # Financial/Environmental Concerns: none                   History of Present Illness/Subjective    HPI: Mary Kay Tejada is a 74 year old female 74-year-old female with a past medical history significant for aortic stenosis, chronic diastolic heart failure, hypertension, paroxysmal atrial fibrillation, coronary artery disease, chronic obstructive pulmonary disease (on 3 L of O2), type 2 diabetes mellitus, osteoarthritis, fibromyalgia/chronic pain, GERD, recurrent  "pleural effusions.     She has had moderate aortic stenosis and has been surveillance with routine echoes for years.  Her dyspnea on exertion has worsened over the past year.  She was found to have progression of her aortic valve stenosis to see severe in June 2023 at admission for acute decompensated heart failure.  She was referred to valve clinic and evaluated by multidisciplinary team and deemed a good candidate for transcatheter aortic valve replacement.     She was admitted to the hospital about 1 week ago for COPD exacerbation.  She was found to have moderate to large right pleural effusion and underwent thoracentesis with 1 L of clear yellow fluid drained.  She did have a significant improvement in her respiratory status.  She was given ceftriaxone during her hospitalization and was discharged on Augmentin. She is Day 2 post TAVR       Physical Examination  Review of Systems   VITALS: BP 99/50 (BP Location: Right arm)   Pulse 66   Temp 97.5  F (36.4  C) (Oral)   Resp 16   Ht 1.651 m (5' 5\")   Wt 60.6 kg (133 lb 8 oz)   LMP  (LMP Unknown)   SpO2 91%   BMI 22.22 kg/m    BMI: Body mass index is 22.22 kg/m .  Wt Readings from Last 3 Encounters:   02/22/24 60.6 kg (133 lb 8 oz)   02/19/24 59 kg (130 lb)   02/10/24 59.6 kg (131 lb 8 oz)       Intake/Output Summary (Last 24 hours) at 2/22/2024 1528  Last data filed at 2/22/2024 1400  Gross per 24 hour   Intake 1130 ml   Output 1200 ml   Net -70 ml     General Appearance:   no distress, normal body habitus   ENT/Mouth: membranes moist, no oral lesions or bleeding gums.      EYES:  no scleral icterus, normal conjunctivae   Neck: no carotid bruits or thyromegaly   Chest/Lungs:   Diminished   Cardiovascular:   Regular. Normal first and second heart sounds with no murmurs, rubs, or gallops; the carotid, radial and posterior tibial pulses are intact, no edema bilaterally    Abdomen:  no organomegaly, masses, bruits, or tenderness; bowel sounds are present "   Extremities: no cyanosis or clubbing   Skin: no xanthelasma, warm.    Neurologic: normal  bilateral, no tremors     Psychiatric: alert and oriented x3, calm     Review Of Systems  Skin: negative  Eyes: negative  Ears/Nose/Throat: negative  Respiratory: No shortness of breath, dyspnea on exertion, cough, or hemoptysis  Cardiovascular: negative  Gastrointestinal: negative  Genitourinary: negative  Musculoskeletal: negative  Neurologic: negative  Psychiatric: negative  Hematologic/Lymphatic/Immunologic: negative  Endocrine: negative          Lab Results    Chemistry/lipid CBC Cardiac Enzymes/BNP/TSH/INR   Recent Labs   Lab Test 09/20/23  1136   CHOL 167   HDL 75   LDL 75   TRIG 83     Recent Labs   Lab Test 09/20/23  1136 07/25/22  1026 12/24/21  0756   LDL 75 53 77     Recent Labs   Lab Test 02/22/24  1158 02/22/24  0820 02/22/24  0454   NA  --   --  141   POTASSIUM  --   --  4.3   CHLORIDE  --   --  105   CO2  --   --  32*   *   < > 132*   BUN  --   --  13.1   CR  --   --  0.66   GFRESTIMATED  --   --  >90   JOEY  --   --  8.9    < > = values in this interval not displayed.     Recent Labs   Lab Test 02/22/24  0454 02/21/24  0432 02/20/24  0911   CR 0.66 0.67 0.67     Recent Labs   Lab Test 02/06/24 1953 09/20/23  1136 05/25/23  0507   A1C 6.2* 6.1* 6.1*          Recent Labs   Lab Test 02/22/24  0454   WBC 8.2   HGB 11.6*   HCT 37.7   MCV 98   *     Recent Labs   Lab Test 02/22/24  0454 02/21/24  0432 02/20/24  0911   HGB 11.6* 11.5* 12.9    Recent Labs   Lab Test 05/24/23  1322 05/24/23  1015 02/08/23  0759   TROPONINI 0.07 0.08 0.09     Recent Labs   Lab Test 02/19/24  1340 02/06/24  1953 06/02/23  0057 05/24/23  1015 02/08/23  0759   BNP  --   --  136* 176* 867*   NTBNPI  --  534  --   --   --    NTBNP 641  --   --   --   --      Recent Labs   Lab Test 05/24/23  1015   TSH 1.17     Recent Labs   Lab Test 02/19/24  1340 02/08/23  0759 01/18/23  0522   INR 1.04 1.34* 1.35*        Medical History   Surgical History Family History Social History   Past Medical History:   Diagnosis Date    Anemia     Aortic stenosis     Atrial fibrillation (H)     Atrial flutter (H)     Benign neoplasm of adenomatous polyp     large intestine     Chronic constipation     Chronic pain syndrome     COPD (chronic obstructive pulmonary disease) (H)     Oxygen at night     Dependence on supplemental oxygen     Oxygen at noc, during the day as needed    Depression     Diabetes mellitus (H)     Dry eye syndrome     Fibromyalgia     Ganglion     left wrist    GERD (gastroesophageal reflux disease)     Hyperlipidemia     Hypertension     Hypokalemia     Infective otitis externa, unspecified     Created by Conversion     Larynx edema     Lung disease     Malignant neoplasm of vulva (H)     Created by Conversion [facility] Annotation: Apr 17 2007  8:24AM - Cammy Bui:  resection per Dr. Alfonso Mane 9/06;  Replacement Utility updated for latest IMO load    Medial epicondylitis     Onychomycosis     Osteoarthritis     Peptic ulcer     Polyneuropathy     Vulvar malignant neoplasm (H)      Past Surgical History:   Procedure Laterality Date    BIOPSY BREAST Right     BIOPSY BREAST Right 01/28/2015    BIOPSY BREAST Right 1/28/2015    Procedure: RIGHT BREAST BIOPSY AFTER WIRE LOCALIZATION AT 0940;  Surgeon: Renée Soriano MD;  Location: [facility];  Service:     BIOPSY OF BREAST, INCISIONAL      Description: Incisional Breast Biopsy;  Recorded: 11/13/2007;  Comments: benign    COLONOSCOPY N/A 6/14/2019    Procedure: COLONOSCOPY;  Surgeon: Eduardo Mora MD;  Location: [facility];  Service: Gastroenterology    CV CORONARY ANGIOGRAM N/A 2/8/2024    Procedure: CV CORONARY ANGIOGRAM;  Surgeon: Moises Valencia MD;  Location: [facility] CATH LAB CV    CV LEFT HEART CATH N/A 2/8/2024    Procedure: Left Heart Catheterization;  Surgeon: Moises Valencia MD;  Location: [facility] CATH LAB CV    CV RIGHT HEART CATH  MEASUREMENTS RECORDED N/A 2/8/2024    Procedure: Right Heart Catheterization;  Surgeon: Moises Valencia MD;  Location: [facility] CV    CV TRANSCATHETER AORTIC VALVE REPLACEMENT-FEMORAL APPROACH N/A 2/20/2024    Procedure: Transcatheter Aortic Valve Replacement, possible cardiopulmonary bypass, possible surgical intervention;  Surgeon: Moises Valencia MD;  Location: [facility] CV    ESOPHAGOSCOPY, GASTROSCOPY, DUODENOSCOPY (EGD), COMBINED N/A 11/6/2018    Procedure: ESOPHAGOGASTRODUODENOSCOPY;  Surgeon: Lit Fernando MD;  Location: [facility] OR;  Service:     HYSTERECTOMY      JOINT REPLACEMENT Left     TKA    OR TRANSCATHETER AORTIC VALVE REPLACEMENT, FEMORAL PERCUTANEOUS APPROACH (STANDBY) N/A 2/20/2024    Procedure: OR TRANSCATHETER AORTIC VALVE REPLACEMENT, FEMORAL PERCUTANEOUS APPROACH (STANDBY);  Surgeon: Ishmael Farias MD;  Location: [facility] CV    PICC TRIPLE LUMEN PLACEMENT  1/12/2023         VA ABLATE HEART DYSRHYTHM FOCUS      Description: Catheter Ablation Atrial Fibrillation;  Recorded: 07/31/2012;  Comments: 7/24/12 PVI with Dr. Gardiner and nilay to all 5 pulm veins and CTI fl ablation line as well.    ZZC SUPRACERV ABD HYSTERECTOMY      Description: Supracervical Hysterectomy;  Proc Date: 01/01/1985;  Comments: some cervix left!; ovaries intact; done for bleeding     Family History   Problem Relation Age of Onset    Heart Failure Mother     Cancer Other         paternal HX-laryngeal     Alcoholism Sister     No Known Problems Daughter     No Known Problems Maternal Grandmother     No Known Problems Maternal Grandfather     No Known Problems Paternal Grandmother     No Known Problems Paternal Grandfather     No Known Problems Maternal Aunt     No Known Problems Paternal Aunt     Alcoholism Sister     Alcoholism Brother     Alcoholism Father     Cancer Paternal Uncle         Gastric-Alcohol    Cancer Paternal Uncle         gastric-Alcohol    Hereditary  Breast and Ovarian Cancer Syndrome No family hx of     Breast Cancer No family hx of     Colon Cancer No family hx of     Endometrial Cancer No family hx of     Ovarian Cancer No family hx of         Social History     Socioeconomic History    Marital status:      Spouse name: Not on file    Number of children: Not on file    Years of education: Not on file    Highest education level: Not on file   Occupational History    Not on file   Tobacco Use    Smoking status: Some Days     Packs/day: .25     Types: Cigarettes     Passive exposure: Never    Smokeless tobacco: Never    Tobacco comments:     seen by TTS inpatient on 3/31/22   Vaping Use    Vaping Use: Never used   Substance and Sexual Activity    Alcohol use: Yes     Comment: Alcoholic Drinks/day: very little    Drug use: No    Sexual activity: Not on file   Other Topics Concern    Not on file   Social History Narrative    Not on file     Social Determinants of Health     Financial Resource Strain: Low Risk  (12/26/2023)    Financial Resource Strain     Within the past 12 months, have you or your family members you live with been unable to get utilities (heat, electricity) when it was really needed?: No   Food Insecurity: Low Risk  (12/26/2023)    Food Insecurity     Within the past 12 months, did you worry that your food would run out before you got money to buy more?: No     Within the past 12 months, did the food you bought just not last and you didn t have money to get more?: No   Transportation Needs: Low Risk  (12/26/2023)    Transportation Needs     Within the past 12 months, has lack of transportation kept you from medical appointments, getting your medicines, non-medical meetings or appointments, work, or from getting things that you need?: No   Physical Activity: Not on file   Stress: Not on file   Social Connections: Not on file   Interpersonal Safety: Low Risk  (9/20/2023)    Interpersonal Safety     Do you feel physically and emotionally  safe where you currently live?: Yes     Within the past 12 months, have you been hit, slapped, kicked or otherwise physically hurt by someone?: No     Within the past 12 months, have you been humiliated or emotionally abused in other ways by your partner or ex-partner?: No   Housing Stability: Low Risk  (12/26/2023)    Housing Stability     Do you have housing? : Yes     Are you worried about losing your housing?: No         Medications  Allergies   Current Facility-Administered Medications   Medication    acetaminophen (TYLENOL) tablet 650 mg    albuterol (PROVENTIL HFA/VENTOLIN HFA) inhaler    aspirin EC tablet 81 mg    atorvastatin (LIPITOR) tablet 20 mg    budesonide-formoterol (SYMBICORT) 160-4.5 MCG/ACT Inhaler 2 puff    cefdinir (OMNICEF) capsule 300 mg    glucose gel 15-30 g    Or    dextrose 50 % injection 25-50 mL    Or    glucagon injection 1 mg    digoxin (LANOXIN) tablet 125 mcg    empagliflozin (JARDIANCE) tablet 10 mg    furosemide (LASIX) half-tab 10 mg    gabapentin (NEURONTIN) capsule 600 mg    HOLD:  Metformin and metformin containing medications if patient received IV contrast with acute kidney injury or severe chronic kidney disease (stage IV or stage V; i.e., eGFR less than 30)    hydrALAZINE (APRESOLINE) injection 10 mg    insulin aspart (NovoLOG) injection (RAPID ACTING)    insulin aspart (NovoLOG) injection (RAPID ACTING)    ipratropium - albuterol 0.5 mg/2.5 mg/3 mL (DUONEB) neb solution 3 mL    ipratropium - albuterol 0.5 mg/2.5 mg/3 mL (DUONEB) neb solution 3 mL    metoprolol tartrate (LOPRESSOR) tablet 25 mg    naloxone (NARCAN) injection 0.2 mg    Or    naloxone (NARCAN) injection 0.4 mg    Or    naloxone (NARCAN) injection 0.2 mg    Or    naloxone (NARCAN) injection 0.4 mg    nicotine (NICODERM CQ) 14 MG/24HR 24 hr patch 1 patch    nicotine Patch in Place    nitroGLYcerin (NITROSTAT) sublingual tablet 0.4 mg    ondansetron (ZOFRAN ODT) ODT tab 4 mg    Or    ondansetron (ZOFRAN)  "injection 4 mg    oxyCODONE (ROXICODONE) tablet 5 mg    Or    oxyCODONE (ROXICODONE) tablet 10 mg    pantoprazole (PROTONIX) EC tablet 40 mg    potassium chloride ER (KLOR-CON M) CR tablet 20 mEq    sucralfate (CARAFATE) tablet 1 g        Allergies   Allergen Reactions    Celebrex [Celecoxib] Rash     patient had butterfly rash - \"lupus-like\"      Latex Rash         50 MINUTES SPENT BY ME on the date of service doing chart review, history, exam, documentation & further activities per the note.      Margy Rea PA-C  Structural Heart Program  [facility]     " No

## 2024-03-12 ENCOUNTER — PATIENT OUTREACH (OUTPATIENT)
Dept: GERIATRIC MEDICINE | Facility: CLINIC | Age: 74
End: 2024-03-12
Payer: COMMERCIAL

## 2024-03-12 ENCOUNTER — TELEPHONE (OUTPATIENT)
Dept: CARDIOLOGY | Facility: CLINIC | Age: 74
End: 2024-03-12
Payer: COMMERCIAL

## 2024-03-12 NOTE — PROGRESS NOTES
[facility] Care Coordination Contact    Prescription for pull ups emailed to [facility].    Samantha Alexandra RN, PHN   [facility]  [phone]

## 2024-03-12 NOTE — TELEPHONE ENCOUNTER
Received VM from patient she is trying to reschedule some appointments, would like a call back. Msg routed to scheduling team.     Yvonne Baez RN on 3/12/2024 at 10:45 AM

## 2024-03-13 DIAGNOSIS — Z95.0 CARDIAC PACEMAKER IN SITU: Primary | ICD-10-CM

## 2024-03-13 DIAGNOSIS — I49.5 SICK SINUS SYNDROME (H): ICD-10-CM

## 2024-03-13 NOTE — DISCHARGE INSTRUCTIONS
Pacemaker Post-operative Checklist    The Device Registered Nurse (RN) reviewed the pacemaker function.    The Device RN did a wound assessment and wound care teaching.    Please call the Device Nurses with any signs of infection or questions regarding wound healing. Device Nurse Line: [phone], Option #3    The Device RN demonstrated and displayed the specific remote monitoring system for your pacemaker.    The Device RN reviewed the Partnership Agreement Form.    Patient Instructions  Do not lift your left arm above the shoulder height, perform any vigorous arm movements such as swimming, golfing, washing windows, shoveling show, vacuuming or lifting greater than 10-15lbs with the affected arm for 4 weeks from the date of implant (until 4/4/24).    To reduce the risk of infection, try to avoid any dental procedures for the first 6 weeks after your pacemaker implant.    You will receive a device identification (ID) card in the mail from the device  within 6 weeks to replace the temporary ID card you were given in the hospital.    You may travel by any mode of transportation; just show your pacemaker ID card. You may be subject to a hand search or use of a handheld wand, but official should not keep the wand over the implant site for greater than 5-10 seconds.    For any surgery, let your doctor know you have a pacemaker.     Your pacemaker system is MRI safe after 4/18/24    Most household appliances, including a microwave, will not interfere with your pacemaker function. If you suspect interference, simply move away from the source. Cell phones do not cause a problem. Please refer to the device booklet from the  or their website under the section on electromagnetic interference (ROGER) for further guidelines on things that may interfere with your pacemaker.       Device Clinic Contact Information  Device Nurses: [phone], Option #3    Device Remote Specialists: [phone], Option  #2. For questions about your Remote Transmission or Transmission Schedule  Device Schedulers: [phone], Option #1

## 2024-03-18 ENCOUNTER — ANCILLARY PROCEDURE (OUTPATIENT)
Dept: CARDIOLOGY | Facility: CLINIC | Age: 74
End: 2024-03-18
Attending: INTERNAL MEDICINE
Payer: COMMERCIAL

## 2024-03-18 DIAGNOSIS — I49.5 SICK SINUS SYNDROME (H): Primary | ICD-10-CM

## 2024-03-18 DIAGNOSIS — Z95.0 CARDIAC PACEMAKER IN SITU: ICD-10-CM

## 2024-03-19 LAB
MDC_IDC_LEAD_CONNECTION_STATUS: NORMAL
MDC_IDC_LEAD_CONNECTION_STATUS: NORMAL
MDC_IDC_LEAD_IMPLANT_DT: NORMAL
MDC_IDC_LEAD_IMPLANT_DT: NORMAL
MDC_IDC_LEAD_LOCATION: NORMAL
MDC_IDC_LEAD_LOCATION: NORMAL
MDC_IDC_LEAD_LOCATION_DETAIL_1: NORMAL
MDC_IDC_LEAD_LOCATION_DETAIL_1: NORMAL
MDC_IDC_LEAD_MFG: NORMAL
MDC_IDC_LEAD_MFG: NORMAL
MDC_IDC_LEAD_MODEL: NORMAL
MDC_IDC_LEAD_MODEL: NORMAL
MDC_IDC_LEAD_POLARITY_TYPE: NORMAL
MDC_IDC_LEAD_POLARITY_TYPE: NORMAL
MDC_IDC_LEAD_SERIAL: NORMAL
MDC_IDC_LEAD_SERIAL: NORMAL
MDC_IDC_LEAD_SPECIAL_FUNCTION: NORMAL
MDC_IDC_LEAD_SPECIAL_FUNCTION: NORMAL
MDC_IDC_MSMT_BATTERY_DTM: NORMAL
MDC_IDC_MSMT_BATTERY_REMAINING_LONGEVITY: 175 MO
MDC_IDC_MSMT_BATTERY_RRT_TRIGGER: 2.62
MDC_IDC_MSMT_BATTERY_STATUS: NORMAL
MDC_IDC_MSMT_BATTERY_VOLTAGE: 3.22 V
MDC_IDC_MSMT_LEADCHNL_RA_IMPEDANCE_VALUE: 361 OHM
MDC_IDC_MSMT_LEADCHNL_RA_IMPEDANCE_VALUE: 456 OHM
MDC_IDC_MSMT_LEADCHNL_RA_PACING_THRESHOLD_AMPLITUDE: 0.5 V
MDC_IDC_MSMT_LEADCHNL_RA_PACING_THRESHOLD_AMPLITUDE: 0.62 V
MDC_IDC_MSMT_LEADCHNL_RA_PACING_THRESHOLD_PULSEWIDTH: 0.4 MS
MDC_IDC_MSMT_LEADCHNL_RA_PACING_THRESHOLD_PULSEWIDTH: 0.4 MS
MDC_IDC_MSMT_LEADCHNL_RA_SENSING_INTR_AMPL: 2.9 MV
MDC_IDC_MSMT_LEADCHNL_RA_SENSING_INTR_AMPL: 3.38 MV
MDC_IDC_MSMT_LEADCHNL_RV_IMPEDANCE_VALUE: 437 OHM
MDC_IDC_MSMT_LEADCHNL_RV_IMPEDANCE_VALUE: 627 OHM
MDC_IDC_MSMT_LEADCHNL_RV_PACING_THRESHOLD_AMPLITUDE: 0.62 V
MDC_IDC_MSMT_LEADCHNL_RV_PACING_THRESHOLD_AMPLITUDE: 0.75 V
MDC_IDC_MSMT_LEADCHNL_RV_PACING_THRESHOLD_PULSEWIDTH: 0.4 MS
MDC_IDC_MSMT_LEADCHNL_RV_PACING_THRESHOLD_PULSEWIDTH: 0.4 MS
MDC_IDC_MSMT_LEADCHNL_RV_SENSING_INTR_AMPL: 19.62 MV
MDC_IDC_MSMT_LEADCHNL_RV_SENSING_INTR_AMPL: 20 MV
MDC_IDC_PG_IMPLANT_DTM: NORMAL
MDC_IDC_PG_MFG: NORMAL
MDC_IDC_PG_MODEL: NORMAL
MDC_IDC_PG_SERIAL: NORMAL
MDC_IDC_PG_TYPE: NORMAL
MDC_IDC_SESS_CLINIC_NAME: NORMAL
MDC_IDC_SESS_DTM: NORMAL
MDC_IDC_SESS_TYPE: NORMAL
MDC_IDC_SET_BRADY_AT_MODE_SWITCH_RATE: 171 {BEATS}/MIN
MDC_IDC_SET_BRADY_HYSTRATE: NORMAL
MDC_IDC_SET_BRADY_LOWRATE: 60 {BEATS}/MIN
MDC_IDC_SET_BRADY_MAX_SENSOR_RATE: 130 {BEATS}/MIN
MDC_IDC_SET_BRADY_MAX_TRACKING_RATE: 130 {BEATS}/MIN
MDC_IDC_SET_BRADY_MODE: NORMAL
MDC_IDC_SET_BRADY_PAV_DELAY_LOW: 150 MS
MDC_IDC_SET_BRADY_SAV_DELAY_LOW: 120 MS
MDC_IDC_SET_LEADCHNL_RA_PACING_AMPLITUDE: NORMAL
MDC_IDC_SET_LEADCHNL_RA_PACING_ANODE_ELECTRODE_1: NORMAL
MDC_IDC_SET_LEADCHNL_RA_PACING_ANODE_LOCATION_1: NORMAL
MDC_IDC_SET_LEADCHNL_RA_PACING_CAPTURE_MODE: NORMAL
MDC_IDC_SET_LEADCHNL_RA_PACING_CATHODE_ELECTRODE_1: NORMAL
MDC_IDC_SET_LEADCHNL_RA_PACING_CATHODE_LOCATION_1: NORMAL
MDC_IDC_SET_LEADCHNL_RA_PACING_POLARITY: NORMAL
MDC_IDC_SET_LEADCHNL_RA_PACING_PULSEWIDTH: 0.4 MS
MDC_IDC_SET_LEADCHNL_RA_SENSING_ANODE_ELECTRODE_1: NORMAL
MDC_IDC_SET_LEADCHNL_RA_SENSING_ANODE_LOCATION_1: NORMAL
MDC_IDC_SET_LEADCHNL_RA_SENSING_CATHODE_ELECTRODE_1: NORMAL
MDC_IDC_SET_LEADCHNL_RA_SENSING_CATHODE_LOCATION_1: NORMAL
MDC_IDC_SET_LEADCHNL_RA_SENSING_POLARITY: NORMAL
MDC_IDC_SET_LEADCHNL_RA_SENSING_SENSITIVITY: 0.3 MV
MDC_IDC_SET_LEADCHNL_RV_PACING_AMPLITUDE: NORMAL
MDC_IDC_SET_LEADCHNL_RV_PACING_ANODE_ELECTRODE_1: NORMAL
MDC_IDC_SET_LEADCHNL_RV_PACING_ANODE_LOCATION_1: NORMAL
MDC_IDC_SET_LEADCHNL_RV_PACING_CAPTURE_MODE: NORMAL
MDC_IDC_SET_LEADCHNL_RV_PACING_CATHODE_ELECTRODE_1: NORMAL
MDC_IDC_SET_LEADCHNL_RV_PACING_CATHODE_LOCATION_1: NORMAL
MDC_IDC_SET_LEADCHNL_RV_PACING_POLARITY: NORMAL
MDC_IDC_SET_LEADCHNL_RV_PACING_PULSEWIDTH: 0.4 MS
MDC_IDC_SET_LEADCHNL_RV_SENSING_ANODE_ELECTRODE_1: NORMAL
MDC_IDC_SET_LEADCHNL_RV_SENSING_ANODE_LOCATION_1: NORMAL
MDC_IDC_SET_LEADCHNL_RV_SENSING_CATHODE_ELECTRODE_1: NORMAL
MDC_IDC_SET_LEADCHNL_RV_SENSING_CATHODE_LOCATION_1: NORMAL
MDC_IDC_SET_LEADCHNL_RV_SENSING_POLARITY: NORMAL
MDC_IDC_SET_LEADCHNL_RV_SENSING_SENSITIVITY: 0.9 MV
MDC_IDC_SET_ZONE_DETECTION_INTERVAL: 350 MS
MDC_IDC_SET_ZONE_DETECTION_INTERVAL: 400 MS
MDC_IDC_SET_ZONE_STATUS: NORMAL
MDC_IDC_SET_ZONE_STATUS: NORMAL
MDC_IDC_SET_ZONE_TYPE: NORMAL
MDC_IDC_SET_ZONE_VENDOR_TYPE: NORMAL
MDC_IDC_STAT_AT_BURDEN_PERCENT: 0 %
MDC_IDC_STAT_AT_DTM_END: NORMAL
MDC_IDC_STAT_AT_DTM_START: NORMAL
MDC_IDC_STAT_AT_MODE_SW_COUNT: 0
MDC_IDC_STAT_BRADY_AP_VP_PERCENT: 0.02 %
MDC_IDC_STAT_BRADY_AP_VS_PERCENT: 43.99 %
MDC_IDC_STAT_BRADY_AS_VP_PERCENT: 0.02 %
MDC_IDC_STAT_BRADY_AS_VS_PERCENT: 55.97 %
MDC_IDC_STAT_BRADY_DTM_END: NORMAL
MDC_IDC_STAT_BRADY_DTM_START: NORMAL
MDC_IDC_STAT_BRADY_RA_PERCENT_PACED: 44 %
MDC_IDC_STAT_BRADY_RV_PERCENT_PACED: 0.04 %
MDC_IDC_STAT_EPISODE_RECENT_COUNT: 0
MDC_IDC_STAT_EPISODE_RECENT_COUNT_DTM_END: NORMAL
MDC_IDC_STAT_EPISODE_RECENT_COUNT_DTM_START: NORMAL
MDC_IDC_STAT_EPISODE_TOTAL_COUNT: 0
MDC_IDC_STAT_EPISODE_TOTAL_COUNT_DTM_END: NORMAL
MDC_IDC_STAT_EPISODE_TOTAL_COUNT_DTM_START: NORMAL
MDC_IDC_STAT_EPISODE_TYPE: NORMAL
MDC_IDC_STAT_TACHYTHERAPY_RECENT_DTM_END: NORMAL
MDC_IDC_STAT_TACHYTHERAPY_RECENT_DTM_START: NORMAL
MDC_IDC_STAT_TACHYTHERAPY_TOTAL_DTM_END: NORMAL
MDC_IDC_STAT_TACHYTHERAPY_TOTAL_DTM_START: NORMAL

## 2024-03-19 PROCEDURE — 93280 PM DEVICE PROGR EVAL DUAL: CPT | Performed by: INTERNAL MEDICINE

## 2024-03-24 DIAGNOSIS — J44.0 CHRONIC OBSTRUCTIVE PULMONARY DISEASE WITH ACUTE LOWER RESPIRATORY INFECTION (H): ICD-10-CM

## 2024-03-25 RX ORDER — ALBUTEROL SULFATE 90 UG/1
2 AEROSOL, METERED RESPIRATORY (INHALATION) EVERY 4 HOURS PRN
Qty: 13.4 G | Refills: 1 | Status: SHIPPED | OUTPATIENT
Start: 2024-03-25 | End: 2024-06-04

## 2024-03-26 ENCOUNTER — NURSE TRIAGE (OUTPATIENT)
Dept: NURSING | Facility: CLINIC | Age: 74
End: 2024-03-26

## 2024-03-26 DIAGNOSIS — S81.802A OPEN WOUND OF LEFT LOWER EXTREMITY, INITIAL ENCOUNTER: Primary | ICD-10-CM

## 2024-03-26 RX ORDER — CEPHALEXIN 500 MG/1
500 CAPSULE ORAL 2 TIMES DAILY
Qty: 20 CAPSULE | Refills: 0 | Status: SHIPPED | OUTPATIENT
Start: 2024-03-26 | End: 2024-04-05

## 2024-03-26 NOTE — TELEPHONE ENCOUNTER
Nurse Triage SBAR    Is this a 2nd Level Triage? YES, LICENSED PRACTITIONER REVIEW IS REQUIRED    Situation: Patient calling about a wound on her left lower leg that is not healing. Patient is planning to see provider in clinic on Monday, 4/1, but would like a message sent over today asking about an antibiotic  Consent: not needed    Background: Patient had an aortic valve replacement on 2/20/24 and was given an antibiotic at that time for a wound on her left lower leg. Patient states a car door hit her leg causing the wound and that it has improved significantly, but is not healing completely. Three to four days ago patient noticed a quarter sized junaid on her right lower leg that she is also concerned about as she does not recall any injury.     Patient has been cleaning the wound with hydrogen peroxide, but recently ran out of bacitracin.    Assessment:   Nickel- sized dried wound on left lower leg - has improved, but is slightly red and tender. Redness around wound extends out about 1/2 inch.  Right lower leg junaid is purplish -yellow, quarter sized, not red, does not look like a bruise, not swollen, not painful.  Chills yesterday, unsure of fever, feels a little better today    Uses [facility] on [address]    Protocol Recommended Disposition:   See in Office Today or Tomorrow    Recommendation: Advised patient to wait for call-back after routing message to provider. Care advice given including when to call back. Patient verbalized understanding and agreed with plan.     Routing to provider to review and advise.    Does the patient meet one of the following criteria for ADS visit consideration? 16+ years old, with an [facility] PCP     TIP  Providers, please consider if this condition is appropriate for management at one of our Acute and Diagnostic Services sites.     If patient is a good candidate, please use dotphrase <dot>triageresponse and select Refer to ADS to document.     Nona Alcala RN  [facility] 3/26/2024 10:07 AM    Reason for Disposition   Using antibiotic ointment > 1 week and sore not completely healed    Additional Information   Negative: Followed a burn   Negative: Followed an injury to the skin (such as a cut or scrape)   Negative: Boil (abscess) suspected (painful red lump)   Negative: Widespread rash or redness   Negative: Cold sore suspected (i.e., fever blister sore on the outer lip)   Negative: Insect bite(s) suspected   Negative: Poison ivy, oak, or sumac rash suspected (e.g., itchy rash after contact with poison ivy)   Negative: Sore(s) on female genital area  (e.g., labia, vagina, vulva)   Negative: Sore(s) on male genital area (e.g., penis, scrotum)   Negative: Wound infection suspected (in traumatic or surgical wound)   Negative: Black (necrotic), dark purple, or blisters develop in area of sore   Negative: Patient sounds very sick or weak to the triager   Negative: Looks infected (e.g., spreading redness, pus) and fever   Negative: Looks infected and large red area (> 2 inches or 5 cm) or streak   Negative: Ulcer with black scab that is spreading, painless and cause unknown   Negative: Using antibiotic ointment > 24 hours and new sore occurs   Negative: Using antibiotic ointment > 48 hours and sore increases in size   Negative: Any other family members with similar sores   Negative: New pressure ulcer suspected   Negative: Sores and crusts are around openings to nose, or anywhere else on face   Negative: Looks infected (e.g., spreading redness, pus) and no fever   Negative: Unexplained sores and 3 or more   Negative: Large sore (> 1 inch or 2.5 cm across)   Negative: Looks like a boil, deep ulcer or is very painful   Negative: Multiple small blisters grouped together in one area of body (i.e., dermatomal distribution or 'band' or 'stripe') and painful   Negative: New or worsening foot sore (e.g., ulcer, wound, blister, red area) and has diabetes    Protocols used:  Sores-A-OH

## 2024-03-26 NOTE — TELEPHONE ENCOUNTER
I will send in an antibiotic - and some antibiotic ointment, but I do need to see her at her appointment early next week.

## 2024-03-26 NOTE — TELEPHONE ENCOUNTER
Returned call to patient and relayed message below from Dr. Brizuela. Patient verbalized understanding and plans to follow up as scheduled next week.     Next 5 appointments (look out 90 days)      Apr 01, 2024  8:20 AM  (Arrive by 8:00 AM)  Provider Visit with Cammy Bui MD  [facility] ([facility] ) [address] [place] [address] [phone]

## 2024-04-01 ENCOUNTER — OFFICE VISIT (OUTPATIENT)
Dept: FAMILY MEDICINE | Facility: CLINIC | Age: 74
End: 2024-04-01
Payer: COMMERCIAL

## 2024-04-01 VITALS
SYSTOLIC BLOOD PRESSURE: 105 MMHG | RESPIRATION RATE: 18 BRPM | OXYGEN SATURATION: 93 % | HEART RATE: 60 BPM | TEMPERATURE: 97.8 F | DIASTOLIC BLOOD PRESSURE: 50 MMHG

## 2024-04-01 DIAGNOSIS — E11.42 DIABETIC POLYNEUROPATHY ASSOCIATED WITH TYPE 2 DIABETES MELLITUS (H): ICD-10-CM

## 2024-04-01 DIAGNOSIS — Z29.11 NEED FOR VACCINATION AGAINST RESPIRATORY SYNCYTIAL VIRUS: ICD-10-CM

## 2024-04-01 DIAGNOSIS — M79.10 TRIGGER POINT: Primary | ICD-10-CM

## 2024-04-01 DIAGNOSIS — J43.9 PULMONARY EMPHYSEMA, UNSPECIFIED EMPHYSEMA TYPE (H): ICD-10-CM

## 2024-04-01 DIAGNOSIS — Z79.899 MEDICATION MANAGEMENT: ICD-10-CM

## 2024-04-01 DIAGNOSIS — M79.7 FIBROMYALGIA: ICD-10-CM

## 2024-04-01 DIAGNOSIS — E43 SEVERE PROTEIN-CALORIE MALNUTRITION (H): ICD-10-CM

## 2024-04-01 PROCEDURE — 99213 OFFICE O/P EST LOW 20 MIN: CPT | Mod: 24 | Performed by: FAMILY MEDICINE

## 2024-04-01 PROCEDURE — 20553 NJX 1/MLT TRIGGER POINTS 3/>: CPT | Performed by: FAMILY MEDICINE

## 2024-04-01 RX ORDER — RESPIRATORY SYNCYTIAL VIRUS VACCINE 120MCG/0.5
0.5 KIT INTRAMUSCULAR ONCE
Qty: 1 EACH | Refills: 0 | Status: CANCELLED | OUTPATIENT
Start: 2024-04-01 | End: 2024-04-01

## 2024-04-01 ASSESSMENT — ENCOUNTER SYMPTOMS: BACK PAIN: 1

## 2024-04-01 NOTE — PROGRESS NOTES
1. Trigger point  2. Fibromyalgia  [facility]    Procedure: MSK: Inject Trigger Points, >3    Date/Time: 4/1/2024 8:44 AM    Performed by: Cammy Bui MD  Authorized by: Cammy Bui MD      UNIVERSAL PROTOCOL   Site Marked: NA  Prior Images Obtained and Reviewed:  NA  Required items: Required blood products, implants, devices and special equipment available (NA)    Patient identity confirmed:  Verbally with patient  NA - No sedation, light sedation, or local anesthesia  Confirmation Checklist:  Patient's identity using two indicators, relevant allergies, procedure was appropriate and matched the consent or emergent situation and correct equipment/implants were available  Time out: Immediately prior to the procedure a time out was called    Universal Protocol: the Joint Commission Universal Protocol was followed    Preparation: Patient was prepped and draped in usual sterile fashion    ESBL (mL):  0     ANESTHESIA    Local Anesthetic:  Lidocaine 1% without epinephrine  Anesthetic Total (mL):  10      SEDATION    Patient Sedated: No      PROCEDURE  Describe Procedure: Procedure Note - Trigger Point Injections    Consent obtained: verbal    Indication:  fibromyalgia/trigger point pain     6 trigger points identified.  Each trigger point swabbed x 3 with Betadine swab.  Each trigger point then injected with 1.6 ml of 1% Lidocaine (no epi).    Total volume injected:  10 ml    Complications:  None, patient tolerated procedure well.    Plan:  Discussed care with patient.  RTC for further injections in 30 days prn.      Length of time physician/provider present for 1:1 monitoring during sedation: 0        3. Need for vaccination against respiratory syncytial virus  Needs to be done at pharmacy      4. Pulmonary emphysema, unspecified emphysema type (H)  H/o COPD - uses oxygen at home; uses inhalers for shortness of breath  -     5. Diabetic polyneuropathy  associated with type 2 diabetes mellitus (H)  H/o type II DM - with neuropathy -     6. Medication management  Due for refills - discussed       Subjective   Mary Kay is a 74 year old, presenting for the following health issues:  Imm/Inj (Trigger shots), Back Pain (Lot of back pain lower back pain pt stated back pain started Friday ), Musculoskeletal Problem (Left leg hasn't healed right since last time seen /), and Refill Request (Pt would like to refill ensure wasn't able to get it before but she can now.)        4/1/2024     8:23 AM   Additional Questions   Roomed by Monroe     Has trigger point pain     Due for oxycodone refill - last filled 2/27/2024        History of Present Illness       Reason for visit:  Tigger shot    She eats 0-1 servings of fruits and vegetables daily.She consumes 1 sweetened beverage(s) daily.   She is taking medications regularly.       Review of Systems   Constitutional:  Negative for chills and fever.   HENT:  Negative for ear pain and sore throat.    Eyes:  Negative for pain and visual disturbance.   Respiratory:  Positive for shortness of breath (chronic). Negative for cough.    Cardiovascular:  Negative for chest pain and palpitations.   Gastrointestinal:  Negative for abdominal pain and vomiting.   Genitourinary:  Negative for dysuria and hematuria.   Musculoskeletal:  Positive for arthralgias, back pain and myalgias.        Chronic pain syndrome - fibromyalgia     Skin:  Negative for color change and rash.   Neurological:  Negative for seizures and syncope.   Hematological:  Bruises/bleeds easily.   Psychiatric/Behavioral:  The patient is nervous/anxious.    All other systems reviewed and are negative.          Objective    /50 (BP Location: Right arm, Patient Position: Sitting, Cuff Size: Adult Regular)   Pulse 60   Temp 97.8  F (36.6  C) (Temporal)   Resp 18   LMP  (LMP Unknown)   SpO2 93%   There is no height or weight on file to calculate BMI.  Physical  Exam  Vitals reviewed.   Constitutional:       General: She is not in acute distress.     Appearance: Normal appearance. She is ill-appearing (chronically ill appearing).   HENT:      Head: Normocephalic.      Right Ear: Tympanic membrane, ear canal and external ear normal.      Left Ear: Tympanic membrane, ear canal and external ear normal.      Nose: Nose normal.      Mouth/Throat:      Mouth: Mucous membranes are moist.      Pharynx: No posterior oropharyngeal erythema.   Eyes:      Extraocular Movements: Extraocular movements intact.      Conjunctiva/sclera: Conjunctivae normal.      Pupils: Pupils are equal, round, and reactive to light.   Cardiovascular:      Rate and Rhythm: Normal rate and regular rhythm.      Pulses: Normal pulses.      Heart sounds: Normal heart sounds. No murmur heard.  Pulmonary:      Effort: Pulmonary effort is normal.      Breath sounds: Normal breath sounds.   Abdominal:      Palpations: Abdomen is soft. There is no mass.      Tenderness: There is no abdominal tenderness. There is no guarding or rebound.   Musculoskeletal:         General: Deformity (DJD - knees, fingers) present. Normal range of motion.      Cervical back: Normal range of motion and neck supple.   Lymphadenopathy:      Cervical: No cervical adenopathy.   Skin:     General: Skin is warm and dry.      Findings: Bruising present.   Neurological:      General: No focal deficit present.      Mental Status: She is alert.   Psychiatric:         Mood and Affect: Mood normal.         Behavior: Behavior normal.            No results found for any visits on 04/01/24.    Prior to immunization administration, verified patients identity using patient s name and date of birth. Please see Immunization Activity for additional information.     Screening Questionnaire for Adult Immunization    Are you sick today?   No   Do you have allergies to medications, food, a vaccine component or latex?   Yes   Have you ever had a serious  reaction after receiving a vaccination?   No   Do you have a long-term health problem with heart, lung, kidney, or metabolic disease (e.g., diabetes), asthma, a blood disorder, no spleen, complement component deficiency, a cochlear implant, or a spinal fluid leak?  Are you on long-term aspirin therapy?   Yes   Do you have cancer, leukemia, HIV/AIDS, or any other immune system problem?   No   Do you have a parent, brother, or sister with an immune system problem?   No   In the past 3 months, have you taken medications that affect  your immune system, such as prednisone, other steroids, or anticancer drugs; drugs for the treatment of rheumatoid arthritis, Crohn s disease, or psoriasis; or have you had radiation treatments?   No   Have you had a seizure, or a brain or other nervous system problem?   No   During the past year, have you received a transfusion of blood or blood    products, or been given immune (gamma) globulin or antiviral drug?   Yes   For women: Are you pregnant or is there a chance you could become       pregnant during the next month?   No   Have you received any vaccinations in the past 4 weeks?   No     Immunization questionnaire answers were all       Patient instructed to remain in clinic for 15 minutes afterwards, and to report any adverse reactions.     Screening performed by Monroe Zamudio on 4/1/2024 at 8:28 AM.     Signed Electronically by: SAVANA SILVER MD

## 2024-04-02 DIAGNOSIS — M79.7 FIBROMYALGIA: ICD-10-CM

## 2024-04-02 DIAGNOSIS — G89.4 CHRONIC PAIN SYNDROME: ICD-10-CM

## 2024-04-02 RX ORDER — OXYCODONE HYDROCHLORIDE 10 MG/1
10 TABLET ORAL EVERY 6 HOURS PRN
Qty: 120 TABLET | Refills: 0 | Status: SHIPPED | OUTPATIENT
Start: 2024-04-02 | End: 2024-04-30

## 2024-04-02 NOTE — TELEPHONE ENCOUNTER
Medication Question or Refill        What medication are you calling about (include dose and sig)?: oxyCODONE IR (ROXICODONE) 10 MG tablet     Preferred Pharmacy[facility]  [id number] - [place], [place] - [address] AT [facility] [address]  [place] [address]  Phone: [phone] Fax: [phone]      Controlled Substance Agreement on file:   CSA -- Patient Level:     [Media Unavailable] Controlled Substance Agreement - Opioid - Scan on 8/16/2023  5:27 PM   [Media Unavailable] Controlled Substance Agreement - Opioid - Scan on 12/16/2015       Who prescribed the medication?: PCP    Do you need a refill? Yes, pt called in she had an appt yesterday wi PCP and thought her medication got called in but stated pharmacy didn't have it. Please send medication to pharmacy if applicable. Please give pt a call to let her know medication was sent as well.  Thanks!    When did you use the medication last? N/A    Patient offered an appointment? No    Do you have any questions or concerns?  No      Could we send this information to you in [facility] or would you prefer to receive a phone call?:   Patient would prefer a phone call   Okay to leave a detailed message?: Yes at Home number on file [phone] (home)

## 2024-04-03 ENCOUNTER — PATIENT OUTREACH (OUTPATIENT)
Dept: GERIATRIC MEDICINE | Facility: CLINIC | Age: 74
End: 2024-04-03
Payer: COMMERCIAL

## 2024-04-03 ENCOUNTER — TELEPHONE (OUTPATIENT)
Dept: GERIATRIC MEDICINE | Facility: CLINIC | Age: 74
End: 2024-04-03
Payer: COMMERCIAL

## 2024-04-03 DIAGNOSIS — R63.4 LOSS OF WEIGHT: Primary | ICD-10-CM

## 2024-04-03 NOTE — PROGRESS NOTES
[facility] Care Coordination Contact    Returned call to member regarding order for ensure. She states office visit with PCP on 4/1/24 and ensure was ordered.     Upon chat review, no order for ensure on file. Will sent PCP a telephone encounter.    Samantha Alexandra RN, PHN   [facility]  [phone]

## 2024-04-04 NOTE — PROGRESS NOTES
Prescription for nutritional supplemental drinks faxed to [facility].    Samantha Alexandra RN, N   [facility]  [phone]

## 2024-04-07 ASSESSMENT — ENCOUNTER SYMPTOMS
SEIZURES: 0
ABDOMINAL PAIN: 0
DYSURIA: 0
EYE PAIN: 0
COUGH: 0
MYALGIAS: 1
COLOR CHANGE: 0
SORE THROAT: 0
VOMITING: 0
FEVER: 0
PALPITATIONS: 0
ARTHRALGIAS: 1
HEMATURIA: 0
CHILLS: 0
BRUISES/BLEEDS EASILY: 1
SHORTNESS OF BREATH: 1
NERVOUS/ANXIOUS: 1

## 2024-04-10 NOTE — PROGRESS NOTES
Returned call to Erin at [facility] [phone]. She stated PCP ordered Ensure Max and this is only cover by waiver. She will send quote to CC.    Samantha Alexandra RN, N   [facility]  [phone]

## 2024-04-14 DIAGNOSIS — E11.649 TYPE 2 DIABETES MELLITUS WITH HYPOGLYCEMIA WITHOUT COMA, WITHOUT LONG-TERM CURRENT USE OF INSULIN (H): ICD-10-CM

## 2024-04-15 RX ORDER — BLOOD SUGAR DIAGNOSTIC
STRIP MISCELLANEOUS
Qty: 300 STRIP | Refills: 1 | Status: SHIPPED | OUTPATIENT
Start: 2024-04-15 | End: 2024-09-24

## 2024-04-17 ENCOUNTER — HOSPITAL ENCOUNTER (OUTPATIENT)
Dept: CARDIOLOGY | Facility: HOSPITAL | Age: 74
Discharge: HOME | End: 2024-04-17
Attending: INTERNAL MEDICINE | Admitting: INTERNAL MEDICINE
Payer: COMMERCIAL

## 2024-04-17 DIAGNOSIS — I35.0 SEVERE AORTIC STENOSIS: ICD-10-CM

## 2024-04-17 PROCEDURE — 93306 TTE W/DOPPLER COMPLETE: CPT | Mod: 26 | Performed by: INTERNAL MEDICINE

## 2024-04-17 PROCEDURE — 93306 TTE W/DOPPLER COMPLETE: CPT

## 2024-04-18 ENCOUNTER — PATIENT OUTREACH (OUTPATIENT)
Dept: GERIATRIC MEDICINE | Facility: CLINIC | Age: 74
End: 2024-04-18
Payer: COMMERCIAL

## 2024-04-18 NOTE — PROGRESS NOTES
[facility] Care Coordination Contact    Called member and updated on status of ensure. She has an appointment with PCP on 4/30/24. She will have the discussion with PCP for documentation in order to have insurance cover the nutritional drinks.     Member is interested in information for home delivered meals. Will request for menus to be sent to member to review before placing referral.     Samantha Alexandra RN, PHN   [facility]  [phone]

## 2024-04-21 DIAGNOSIS — Z95.2 S/P TAVR (TRANSCATHETER AORTIC VALVE REPLACEMENT): Primary | ICD-10-CM

## 2024-04-24 ENCOUNTER — OFFICE VISIT (OUTPATIENT)
Dept: CARDIOLOGY | Facility: CLINIC | Age: 74
End: 2024-04-24
Payer: COMMERCIAL

## 2024-04-24 ENCOUNTER — APPOINTMENT (OUTPATIENT)
Dept: CARDIOLOGY | Facility: CLINIC | Age: 74
End: 2024-04-24
Payer: COMMERCIAL

## 2024-04-24 VITALS
HEIGHT: 66 IN | HEART RATE: 67 BPM | DIASTOLIC BLOOD PRESSURE: 50 MMHG | SYSTOLIC BLOOD PRESSURE: 104 MMHG | WEIGHT: 147 LBS | BODY MASS INDEX: 23.63 KG/M2 | RESPIRATION RATE: 18 BRPM

## 2024-04-24 DIAGNOSIS — I48.0 PAROXYSMAL ATRIAL FIBRILLATION (H): ICD-10-CM

## 2024-04-24 DIAGNOSIS — I49.5 SICK SINUS SYNDROME (H): Primary | ICD-10-CM

## 2024-04-24 DIAGNOSIS — I50.32 CHRONIC HEART FAILURE WITH PRESERVED EJECTION FRACTION (H): ICD-10-CM

## 2024-04-24 DIAGNOSIS — I10 ESSENTIAL HYPERTENSION: ICD-10-CM

## 2024-04-24 DIAGNOSIS — Z95.2 S/P TAVR (TRANSCATHETER AORTIC VALVE REPLACEMENT): ICD-10-CM

## 2024-04-24 LAB
ANION GAP SERPL CALCULATED.3IONS-SCNC: 9 MMOL/L (ref 7–15)
BUN SERPL-MCNC: 20.4 MG/DL (ref 8–23)
CALCIUM SERPL-MCNC: 9.3 MG/DL (ref 8.8–10.2)
CHLORIDE SERPL-SCNC: 103 MMOL/L (ref 98–107)
CREAT SERPL-MCNC: 0.68 MG/DL (ref 0.51–0.95)
DEPRECATED HCO3 PLAS-SCNC: 30 MMOL/L (ref 22–29)
EGFRCR SERPLBLD CKD-EPI 2021: >90 ML/MIN/1.73M2
ERYTHROCYTE [DISTWIDTH] IN BLOOD BY AUTOMATED COUNT: 14 % (ref 10–15)
GLUCOSE SERPL-MCNC: 180 MG/DL (ref 70–99)
HCT VFR BLD AUTO: 39.3 % (ref 35–47)
HGB BLD-MCNC: 11.7 G/DL (ref 11.7–15.7)
MCH RBC QN AUTO: 28.1 PG (ref 26.5–33)
MCHC RBC AUTO-ENTMCNC: 29.8 G/DL (ref 31.5–36.5)
MCV RBC AUTO: 95 FL (ref 78–100)
PLATELET # BLD AUTO: 154 10E3/UL (ref 150–450)
POTASSIUM SERPL-SCNC: 4.3 MMOL/L (ref 3.4–5.3)
RBC # BLD AUTO: 4.16 10E6/UL (ref 3.8–5.2)
SODIUM SERPL-SCNC: 142 MMOL/L (ref 135–145)
WBC # BLD AUTO: 7 10E3/UL (ref 4–11)

## 2024-04-24 PROCEDURE — G2211 COMPLEX E/M VISIT ADD ON: HCPCS | Performed by: PHYSICIAN ASSISTANT

## 2024-04-24 PROCEDURE — 85027 COMPLETE CBC AUTOMATED: CPT | Performed by: PHYSICIAN ASSISTANT

## 2024-04-24 PROCEDURE — 36415 COLL VENOUS BLD VENIPUNCTURE: CPT | Performed by: PHYSICIAN ASSISTANT

## 2024-04-24 PROCEDURE — 99215 OFFICE O/P EST HI 40 MIN: CPT | Performed by: PHYSICIAN ASSISTANT

## 2024-04-24 PROCEDURE — 93000 ELECTROCARDIOGRAM COMPLETE: CPT | Performed by: INTERNAL MEDICINE

## 2024-04-24 PROCEDURE — 80048 BASIC METABOLIC PNL TOTAL CA: CPT | Performed by: PHYSICIAN ASSISTANT

## 2024-04-24 RX ORDER — EMOLLIENT COMBINATION NO.110
LOTION (ML) TOPICAL
COMMUNITY
Start: 2024-03-05

## 2024-04-24 NOTE — PATIENT INSTRUCTIONS
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 Please call the clinic if you gain more than a 2-3 pounds in 1-2 days or more than 5 pounds in 1 week   - please call the clinic if you notice worsening symptoms such as fatigue, shortness of breath, leg swelling.   - follow a low sodium diet   - aim for 50-60 ounces of fluid daily.   - You have an appointment with [facility] NP on 4/29/2024 at 3:30 pm  - follow-up with Dr. Pizano in June as scheduled     **Remember you will need prophylactic antibiotics for all dental visits in the future.  You can get the Rx from your dentist, your PCP or from us.  If possible, still refrain from going to the dentist until 6 months or more after your valve replacement      If you have questions or concerns, please call using the numbers below:    Valve Clinic Phone   [phone]    After Hours/Scheduling  [phone]    Otherwise you can dial the nurse directly at:                SALVADOR Suh, SALVADOR  [phone]    Margy Rea PA-C  Structural Heart Program  [facility]

## 2024-04-24 NOTE — PROGRESS NOTES
HEART CARE ENCOUNTER NOTE       [facility]  [phone]    Assessment/Recommendations   Assessment:  Severe aortic stenosis status post TAVR using a 26 mm Douglas CHRISTELLE 3 ultra Resilia valve on 2/22/2024.  Her most recent echocardiogram showed a mean gradient of 8 mmHg  Chronic diastolic heart failure, NYHA class III - weight is increased, but she does not appear acutely volume overloaded on exam. Reports shortness of breath is improved from prior to TAVR  Coronary artery disease -pre-TAVR coronary angiogram showed mild to moderate nonobstructive CAD  Hypertension -blood pressure is controlled  Paroxysmal atrial fibrillation   Sinus node dysfunction -she had a new left bundle branch block and first-degree AV block postprocedure.  She was discharged with MCOT which showed showed 4-second pause.  She is now status post PPM on 3/7/2024. Her next device check is scheduled for 4/29    Plan:  Continue anticoagulation with Eliquis  Continue current doses of Lasix, metoprolol, Jardiance  Continue Lipitor  Monitor weight daily and keep a log  Follow a low sodium diet  Follow up with heart failure team on 4/29  Follow up with Dr. Pizano as scheduled in June    Thank you for the opportunity to participate in the care of Mary Kay Tejada. It's been a pleasure working with her.  Please do not hesitate to call with any questions or concerns.    The longitudinal plan of care for the diagnosis(es)/condition(s) as documented were addressed during this visit. Due to the added complexity in care, we will continue to support Mary Kay in the subsequent management and with ongoing continuity of care.        History of Present Illness/Subjective    I had the opportunity to see Mary Kay Tejada at the [facility] for follow-up visit after TAVR    She has a past medical history significant for severe aortic stenosis (s/p TAVR), chronic diastolic heart failure, coronary artery disease, hypertension,  "paroxysmal atrial fibrillation, left bundle branch block, first-degree AV block, COPD, type 2 diabetes mellitus, osteoarthritis, fibromyalgia, GERD, history of recurrent pleural effusions    She underwent successful transcatheter aortic valve replacement on 2/22/2024.  She had a new left bundle branch block postprocedure and was discharged with MCOT which showed a 4-second pause.  She underwent PPM on 3/7/2024.  She overall is feeling okay.  She does have chronic shortness of breath but she feels that it is okay and is actually improved since the TAVR procedure.  She usually wears 2.5 L of oxygen at home, typically at night.  She has had significant weight gain over the past month and a half.  Is not weighing herself daily but reports she will be moving forward.  She reports a good appetite and eating \"lots of junk food.\"  She admits to eating foods that are higher in sodium although she does try to read food labels and pick foods with lower sodium content.    Mary Kay Tejada denies exertional chest discomfort, palpitations, shortness of breath at rest, lower extremity edema PND, orthopnea, lightheadedness, dizziness, pre-syncope or syncope.  Mary Kay Tejada also denies any recent weight loss, changes in appetite, nausea or vomiting.   ____________________________________________________________  Echo: 4/17/2024  Interpretation Summary     1. Normal left ventricular size and systolic performance with a visually  estimated ejection fraction of 55-60%.  2. There is a bio-prosthetic aortic valve (documented 26 mm Douglas Mai 3  Resilia tissue valve).  Â  Normal aortic valve prosthesis metrics with a mean systolic gradient of 8  mmHg and a peak anterograde velocity of 1.9 m/sec.  Â  No aortic insufficiency is detected.  3. Normal right ventricular size and systolic performance.  4. There is mild left atrial enlargement.    EKG (personally reviewed and interpreted):  Sinus rhythm    Cardiac device check " "3/18/2024:  Encounter Type: Patient seen in clinic for 1 week PO pacemaker device evaluation and iterative programming (patient accompanied by Cynthia)  Device: Medtronic Varnell (D) pacemaker  Pacing %/Programmed: AP 44%,  <0.1%, AAI-DDD 60-130bpm  Lead(s): Stable  Battery longevity: Estimating 14.6 years remaining  Presenting rhythm: APVS 61bpm  Underlying rhythm: SR 61bpm  Heart rates: Stable, HR's per histogram range 60-130bpm and primarily 60-90bpm  Atrial High rates: None  Anticoagulant: Eliquis  Ventricular High rates: None  Comments: Normal device function. Adjusted device clock time to current. Steri strips removed and area cleansed. Incision clean, dry, intact, and no signs or symptoms of infection. Reviewed incision care and monitoring, activity restrictions, remote monitoring, routine follow-up care, and when to call the clinic.        Physical Examination Review of Systems   Vitals: /50 (BP Location: Left arm, Patient Position: Sitting, Cuff Size: Adult Regular)   Pulse 67   Resp 18   Ht 1.676 m (5' 6\")   Wt 66.7 kg (147 lb)   LMP  (LMP Unknown)   BMI 23.73 kg/m    BMI= Body mass index is 23.73 kg/m .  Wt Readings from Last 3 Encounters:   04/24/24 66.7 kg (147 lb)   03/05/24 60.8 kg (134 lb)   02/29/24 59.4 kg (131 lb)       General Appearance:   Alert, cooperative and in no acute distress   ENT/Mouth: membranes moist, no oral lesions or bleeding gums.      EYES:  no scleral icterus, normal conjunctivae   Neck: Supple without lymphadenopathy.  Thyroid not visualized   Chest/Lungs:   Diminished RRL   Cardiovascular:   Regular. Normal first and second heart sounds with no murmurs, rubs, or gallops; the carotid, radial and posterior tibial pulses are intact, trace edema bilaterally    Abdomen:  Soft and nontender. Bowel sounds are present in all quadrants   Extremities: no cyanosis or clubbing.    Skin: no xanthelasma, warm.    Neurologic: normal gait, normal  bilateral, no tremors "   Psychiatric: Normal mood and affect       Please refer above for cardiac ROS details.      Medical History  Surgical History Family History Social History   Past Medical History:   Diagnosis Date    Anemia     Aortic stenosis     Aortic valve disorder     Atrial fibrillation (H)     Atrial flutter (H)     Benign neoplasm of adenomatous polyp     large intestine     Chronic constipation     Chronic heart failure with preserved ejection fraction (H) 02/29/2024    Chronic pain syndrome     Congestive heart failure (H)     COPD (chronic obstructive pulmonary disease) (H)     Oxygen at night     Dependence on supplemental oxygen     Oxygen at noc, during the day as needed    Depression     Diabetes mellitus (H)     Dry eye syndrome     Fibromyalgia     Ganglion     left wrist    GERD (gastroesophageal reflux disease)     Hyperlipidemia     Hypertension     Hypokalemia     Infective otitis externa, unspecified     Created by Conversion     Larynx edema     Lung disease     Malignant neoplasm of vulva (H)     Created by Conversion [facility] Annotation: Apr 17 2007  8:24AM - Cammy Bui:  resection per Dr. Alfonso Mane 9/06;  Replacement Utility updated for latest IMO load    Medial epicondylitis     Onychomycosis     Osteoarthritis     Peptic ulcer     Polyneuropathy     Vulvar malignant neoplasm (H)      Past Surgical History:   Procedure Laterality Date    BIOPSY BREAST Right     BIOPSY BREAST Right 01/28/2015    BIOPSY BREAST Right 01/28/2015    Procedure: RIGHT BREAST BIOPSY AFTER WIRE LOCALIZATION AT 0940;  Surgeon: Renée Soriano MD;  Location: [facility];  Service:     BIOPSY OF BREAST, INCISIONAL      Description: Incisional Breast Biopsy;  Recorded: 11/13/2007;  Comments: benign    COLONOSCOPY N/A 06/14/2019    Procedure: COLONOSCOPY;  Surgeon: Eduardo Mora MD;  Location: [facility] [place];  Service: Gastroenterology    CV CORONARY ANGIOGRAM N/A 02/08/2024    Procedure: CV CORONARY  ANGIOGRAM;  Surgeon: Moises Valencia MD;  Location: [facility]    CV LEFT HEART CATH N/A 02/08/2024    Procedure: Left Heart Catheterization;  Surgeon: Moises Valencia MD;  Location: [facility]    CV RIGHT HEART CATH MEASUREMENTS RECORDED N/A 02/08/2024    Procedure: Right Heart Catheterization;  Surgeon: Moises Valencia MD;  Location: [facility]    CV TRANSCATHETER AORTIC VALVE REPLACEMENT-FEMORAL APPROACH N/A 02/20/2024    Procedure: Transcatheter Aortic Valve Replacement, possible cardiopulmonary bypass, possible surgical intervention;  Surgeon: Moises Valencia MD;  Location: [facility]    EP PACEMAKER DEVICE & IMPLANT- HIS LEAD DUAL N/A 3/7/2024    Procedure: Pacemaker Device & Lead Implant - HIS Lead Dual;  Surgeon: Donnell Leon MD;  Location: [facility]    ESOPHAGOSCOPY, GASTROSCOPY, DUODENOSCOPY (EGD), COMBINED N/A 11/06/2018    Procedure: ESOPHAGOGASTRODUODENOSCOPY;  Surgeon: Lit Fernando MD;  Location: [facility] OR;  Service:     HYSTERECTOMY      JOINT REPLACEMENT Left     TKA    OR TRANSCATHETER AORTIC VALVE REPLACEMENT, FEMORAL PERCUTANEOUS APPROACH (STANDBY) N/A 02/20/2024    Procedure: OR TRANSCATHETER AORTIC VALVE REPLACEMENT, FEMORAL PERCUTANEOUS APPROACH (STANDBY);  Surgeon: Ishmael Farias MD;  Location: [facility]    PICC TRIPLE LUMEN PLACEMENT  01/12/2023         MS ABLATE HEART DYSRHYTHM FOCUS      Description: Catheter Ablation Atrial Fibrillation;  Recorded: 07/31/2012;  Comments: 7/24/12 PVI with Dr. Bansal to all 5 pulm veins and CTI fl ablation line as well.    TRANSCATHETER AORTIC-VALVE REPLACEMENT      ZZC SUPRACERV ABD HYSTERECTOMY      Description: Supracervical Hysterectomy;  Proc Date: 01/01/1985;  Comments: some cervix left!; ovaries intact; done for bleeding     Family History   Problem Relation Age of Onset    Heart Failure Mother     Cancer Other         paternal  HX-laryngeal     Alcoholism Sister     No Known Problems Daughter     No Known Problems Maternal Grandmother     No Known Problems Maternal Grandfather     No Known Problems Paternal Grandmother     No Known Problems Paternal Grandfather     No Known Problems Maternal Aunt     No Known Problems Paternal Aunt     Alcoholism Sister     Alcoholism Brother     Alcoholism Father     Cancer Paternal Uncle         Gastric-Alcohol    Cancer Paternal Uncle         gastric-Alcohol    Hereditary Breast and Ovarian Cancer Syndrome No family hx of     Breast Cancer No family hx of     Colon Cancer No family hx of     Endometrial Cancer No family hx of     Ovarian Cancer No family hx of     Social History     Socioeconomic History    Marital status:      Spouse name: Not on file    Number of children: Not on file    Years of education: Not on file    Highest education level: Not on file   Occupational History    Not on file   Tobacco Use    Smoking status: Some Days     Current packs/day: 0.25     Types: Cigarettes     Passive exposure: Never    Smokeless tobacco: Never    Tobacco comments:     seen by TTS inpatient on 3/31/22   Vaping Use    Vaping status: Never Used   Substance and Sexual Activity    Alcohol use: Yes     Comment: Alcoholic Drinks/day: very little    Drug use: No    Sexual activity: Not on file   Other Topics Concern    Not on file   Social History Narrative    Not on file     Social Determinants of Health     Financial Resource Strain: Low Risk  (12/26/2023)    Financial Resource Strain     Within the past 12 months, have you or your family members you live with been unable to get utilities (heat, electricity) when it was really needed?: No   Food Insecurity: Low Risk  (12/26/2023)    Food Insecurity     Within the past 12 months, did you worry that your food would run out before you got money to buy more?: No     Within the past 12 months, did the food you bought just not last and you didn t have money  "to get more?: No   Transportation Needs: Low Risk  (12/26/2023)    Transportation Needs     Within the past 12 months, has lack of transportation kept you from medical appointments, getting your medicines, non-medical meetings or appointments, work, or from getting things that you need?: No   Physical Activity: Not on file   Stress: Not on file   Social Connections: Not on file   Interpersonal Safety: Low Risk  (4/1/2024)    Interpersonal Safety     Do you feel physically and emotionally safe where you currently live?: Yes     Within the past 12 months, have you been hit, slapped, kicked or otherwise physically hurt by someone?: No     Within the past 12 months, have you been humiliated or emotionally abused in other ways by your partner or ex-partner?: No   Housing Stability: Low Risk  (12/26/2023)    Housing Stability     Do you have housing? : Yes     Are you worried about losing your housing?: No          Medications  Allergies   Current Outpatient Medications   Medication Sig Dispense Refill    ACCU-CHEK SOFTCLIX LANCETS lancets [ACCU-CHEK SOFTCLIX LANCETS LANCETS] TEST THREE TIMES DAILY 300 each 3    albuterol (PROAIR HFA/PROVENTIL HFA/VENTOLIN HFA) 108 (90 Base) MCG/ACT inhaler INHALE 2 PUFFS INTO THE LUNGS EVERY 4 HOURS AS NEEDED FOR WHEEZING 13.4 g 1    apixaban ANTICOAGULANT (ELIQUIS) 5 MG tablet Take 1 tablet (5 mg) by mouth 2 times daily 180 tablet 2    atorvastatin (LIPITOR) 20 MG tablet TAKE 1 TABLET(20 MG) BY MOUTH AT BEDTIME 90 tablet 3    BD ULTRA-FINE SHORT PEN NEEDLE 31 gauge x 5/16\" Ndle [BD ULTRA-FINE SHORT PEN NEEDLE 31 GAUGE X 5/16\" NDLE] TEST FOUR TIMES DAILY WITH MEALS AND AT BEDTIME 400 each 3    blood glucose (ONETOUCH VERIO IQ) test strip TEST THREE TIMES DAILY 300 strip 1    blood-glucose meter (ONETOUCH VERIO IQ METER) Misc [BLOOD-GLUCOSE METER (ONETOUCH VERIO IQ METER) MISC] Check blood sugar three times a day. 1 each 0    diaper,brief,adult,disposable (ADULT BRIEFS - LARGE) Misc " [DIAPER,BRIEF,ADULT,DISPOSABLE (ADULT BRIEFS - LARGE) MISC] Use 3-4 daily as needed for incontinence 120 each 6    Emollient (EUCERIN ORIGINAL HEALING) LOTN APPLY LIBERALLY TO DRY SKIN TWICE DAILY AS NEEDED      furosemide (LASIX) 20 MG tablet Take 1 tablet (20 mg) by mouth daily 30 tablet 1    gabapentin (NEURONTIN) 600 MG tablet TAKE 1 TABLET(600 MG) BY MOUTH THREE TIMES DAILY 270 tablet 2    generic lancets (FINGERSTIX LANCETS) [GENERIC LANCETS (FINGERSTIX LANCETS)] Dispense brand per patient's insurance at pharmacy discretion. 300 each 0    ipratropium - albuterol 0.5 mg/2.5 mg/3 mL (DUONEB) 0.5-2.5 (3) MG/3ML neb solution INHALE 1 VIAL VIA NEBULIZER EVERY 6 HOURS AS NEEDED FOR SHORTNESS OF BREATH OR WHEEZING 90 mL 0    JARDIANCE 10 MG TABS tablet TAKE 1 TABLET(10 MG) BY MOUTH DAILY 90 tablet 2    magnesium oxide (MAG-OX) 400 MG tablet Take 1 tablet (400 mg) by mouth daily 30 tablet 0    meclizine (ANTIVERT) 25 MG tablet TAKE 1 TABLET(25 MG) BY MOUTH THREE TIMES DAILY AS NEEDED FOR DIZZINESS OR NAUSEA 45 tablet 5    metoprolol tartrate (LOPRESSOR) 25 MG tablet Take 1 tablet (25 mg) by mouth 2 times daily 60 tablet 3    mometasone-formoterol (DULERA) 200-5 MCG/ACT inhaler INHALE 2 PUFFS INTO THE LUNGS TWICE DAILY 39 g 3    naloxone (NARCAN) 4 MG/0.1ML nasal spray Spray 1 spray (4 mg) into one nostril alternating nostrils once as needed for opioid reversal every 2-3 minutes until assistance arrives 0.2 mL 0    neomycin-polymyxin-pramoxine (NEOSPORIN PLUS) 1 % external cream Apply topically to wound BID until healed. 28 g 0    Nutritional Supplements (ENSURE COMPLETE) LIQD Take 1 Can by mouth daily 7110 mL 11    nystatin (NYSTOP) 922314 UNIT/GM external powder APPLY TO THE AFFECTED AREA(S) 2-3 TIMES DAILY AS NEEDED 180 g 0    omeprazole (PRILOSEC) 20 MG DR capsule TAKE 1 CAPSULE(20 MG) BY MOUTH TWICE DAILY 180 capsule 1    oxyCODONE IR (ROXICODONE) 10 MG tablet Take 1 tablet (10 mg) by mouth every 6 hours as needed  "for moderate to severe pain 120 tablet 0    potassium chloride ER (KLOR-CON M) 20 MEQ CR tablet TAKE 1 TABLET(20 MEQ) BY MOUTH DAILY 90 tablet 3    sucralfate (CARAFATE) 1 GM tablet CRUSH ONE TABLET AND MIX WITH A LLITTLE WATER AND SWALLOW FOUR TIMES DAILY 360 tablet 3    SYMBICORT 160-4.5 MCG/ACT Inhaler INHALE 2 PUFFS INTO THE LUNGS TWICE DAILY 10.2 g 4    Allergies   Allergen Reactions    Celebrex [Celecoxib] Rash     patient had butterfly rash - \"lupus-like\"      Latex Rash         Lab Results    Chemistry/lipid CBC Cardiac Enzymes/BNP/TSH/INR   Recent Labs   Lab Test 09/20/23  1136   CHOL 167   HDL 75   LDL 75   TRIG 83     Recent Labs   Lab Test 09/20/23  1136 07/25/22  1026 12/24/21  0756   LDL 75 53 77     Recent Labs   Lab Test 03/07/24  1059      POTASSIUM 4.3   CHLORIDE 106   CO2 35*   GLC 83   BUN 12.1   CR 0.64   GFRESTIMATED >90   JOEY 9.2     Recent Labs   Lab Test 03/07/24  1059 02/29/24  1326 02/23/24  0446   CR 0.64 0.76 0.66     Recent Labs   Lab Test 02/06/24  1953 09/20/23  1136 05/25/23  0507   A1C 6.2* 6.1* 6.1*    Recent Labs   Lab Test 03/07/24  1059   WBC 7.0   HGB 11.8   HCT 37.3   MCV 95   *     Recent Labs   Lab Test 03/07/24  1059 02/23/24  0446 02/22/24  0454   HGB 11.8 12.5 11.6*    Recent Labs   Lab Test 05/24/23  1322 05/24/23  1015 02/08/23  0759   TROPONINI 0.07 0.08 0.09     Recent Labs   Lab Test 02/19/24  1340 02/06/24  1953 06/02/23  0057 05/24/23  1015 02/08/23  0759   BNP  --   --  136* 176* 867*   NTBNPI  --  534  --   --   --    NTBNP 641  --   --   --   --      Recent Labs   Lab Test 05/24/23  1015   TSH 1.17     Recent Labs   Lab Test 02/19/24  1340 02/08/23  0759 01/18/23  0522   INR 1.04 1.34* 1.35*        40 minutes spent on the date of encounter doing chart review, review of outside records, review of test results, interpretation with above tests, patient visit, and documentation.      This note has been dictated using voice recognition software. Any " grammatical or context distortions are unintentional and inherent to the software.    Margy Rea PA-C  Structural Heart Program  [facility]

## 2024-04-24 NOTE — LETTER
4/24/2024    SAVANA SILVER MD  [address] [place] [id number]    RE: Mary Kay Tejada       Dear Colleague,     I had the pleasure of seeing Mary Kay Tejada in the [facility].  HEART CARE ENCOUNTER NOTE       [facility]  [phone]    Assessment/Recommendations   Assessment:  Severe aortic stenosis status post TAVR using a 26 mm Douglas CHRISTELLE 3 ultra Resilia valve on 2/22/2024.  Her most recent echocardiogram showed a mean gradient of 8 mmHg  Chronic diastolic heart failure, NYHA class III - weight is increased, but she does not appear acutely volume overloaded on exam. Reports shortness of breath is improved from prior to TAVR  Coronary artery disease -pre-TAVR coronary angiogram showed mild to moderate nonobstructive CAD  Hypertension -blood pressure is controlled  Paroxysmal atrial fibrillation   Sinus node dysfunction -she had a new left bundle branch block and first-degree AV block postprocedure.  She was discharged with MCOT which showed showed 4-second pause.  She is now status post PPM on 3/7/2024. Her next device check is scheduled for 4/29    Plan:  Continue anticoagulation with Eliquis  Continue current doses of Lasix, metoprolol, Jardiance  Continue Lipitor  Monitor weight daily and keep a log  Follow a low sodium diet  Follow up with heart failure team on 4/29  Follow up with Dr. Pizano as scheduled in June    Thank you for the opportunity to participate in the care of Mary Kay Tejada. It's been a pleasure working with her.  Please do not hesitate to call with any questions or concerns.    The longitudinal plan of care for the diagnosis(es)/condition(s) as documented were addressed during this visit. Due to the added complexity in care, we will continue to support Mary Kay in the subsequent management and with ongoing continuity of care.        History of Present Illness/Subjective    I had the opportunity to see Mary Kay Tejada at the   "[facility] for follow-up visit after TAVR    She has a past medical history significant for severe aortic stenosis (s/p TAVR), chronic diastolic heart failure, coronary artery disease, hypertension, paroxysmal atrial fibrillation, left bundle branch block, first-degree AV block, COPD, type 2 diabetes mellitus, osteoarthritis, fibromyalgia, GERD, history of recurrent pleural effusions    She underwent successful transcatheter aortic valve replacement on 2/22/2024.  She had a new left bundle branch block postprocedure and was discharged with MCOT which showed a 4-second pause.  She underwent PPM on 3/7/2024.  She overall is feeling okay.  She does have chronic shortness of breath but she feels that it is okay and is actually improved since the TAVR procedure.  She usually wears 2.5 L of oxygen at home, typically at night.  She has had significant weight gain over the past month and a half.  Is not weighing herself daily but reports she will be moving forward.  She reports a good appetite and eating \"lots of junk food.\"  She admits to eating foods that are higher in sodium although she does try to read food labels and pick foods with lower sodium content.    Mary Kay Tejada denies exertional chest discomfort, palpitations, shortness of breath at rest, lower extremity edema PND, orthopnea, lightheadedness, dizziness, pre-syncope or syncope.  Mary Kay Tejada also denies any recent weight loss, changes in appetite, nausea or vomiting.   ____________________________________________________________  Echo: 4/17/2024  Interpretation Summary     1. Normal left ventricular size and systolic performance with a visually  estimated ejection fraction of 55-60%.  2. There is a bio-prosthetic aortic valve (documented 26 mm Douglas Mai 3  Resilia tissue valve).  Â  Normal aortic valve prosthesis metrics with a mean systolic gradient of 8  mmHg and a peak anterograde velocity of 1.9 m/sec.  Â  No aortic " "insufficiency is detected.  3. Normal right ventricular size and systolic performance.  4. There is mild left atrial enlargement.    EKG (personally reviewed and interpreted):  Sinus rhythm    Cardiac device check 3/18/2024:  Encounter Type: Patient seen in clinic for 1 week PO pacemaker device evaluation and iterative programming (patient accompanied by Cynthia)  Device: Medtronic Lucy (D) pacemaker  Pacing %/Programmed: AP 44%,  <0.1%, AAI-DDD 60-130bpm  Lead(s): Stable  Battery longevity: Estimating 14.6 years remaining  Presenting rhythm: APVS 61bpm  Underlying rhythm: SR 61bpm  Heart rates: Stable, HR's per histogram range 60-130bpm and primarily 60-90bpm  Atrial High rates: None  Anticoagulant: Eliquis  Ventricular High rates: None  Comments: Normal device function. Adjusted device clock time to current. Steri strips removed and area cleansed. Incision clean, dry, intact, and no signs or symptoms of infection. Reviewed incision care and monitoring, activity restrictions, remote monitoring, routine follow-up care, and when to call the clinic.        Physical Examination Review of Systems   Vitals: /50 (BP Location: Left arm, Patient Position: Sitting, Cuff Size: Adult Regular)   Pulse 67   Resp 18   Ht 1.676 m (5' 6\")   Wt 66.7 kg (147 lb)   LMP  (LMP Unknown)   BMI 23.73 kg/m    BMI= Body mass index is 23.73 kg/m .  Wt Readings from Last 3 Encounters:   04/24/24 66.7 kg (147 lb)   03/05/24 60.8 kg (134 lb)   02/29/24 59.4 kg (131 lb)       General Appearance:   Alert, cooperative and in no acute distress   ENT/Mouth: membranes moist, no oral lesions or bleeding gums.      EYES:  no scleral icterus, normal conjunctivae   Neck: Supple without lymphadenopathy.  Thyroid not visualized   Chest/Lungs:   Diminished RRL   Cardiovascular:   Regular. Normal first and second heart sounds with no murmurs, rubs, or gallops; the carotid, radial and posterior tibial pulses are intact, trace edema bilaterally  "   Abdomen:  Soft and nontender. Bowel sounds are present in all quadrants   Extremities: no cyanosis or clubbing.    Skin: no xanthelasma, warm.    Neurologic: normal gait, normal  bilateral, no tremors   Psychiatric: Normal mood and affect       Please refer above for cardiac ROS details.      Medical History  Surgical History Family History Social History   Past Medical History:   Diagnosis Date    Anemia     Aortic stenosis     Aortic valve disorder     Atrial fibrillation (H)     Atrial flutter (H)     Benign neoplasm of adenomatous polyp     large intestine     Chronic constipation     Chronic heart failure with preserved ejection fraction (H) 02/29/2024    Chronic pain syndrome     Congestive heart failure (H)     COPD (chronic obstructive pulmonary disease) (H)     Oxygen at night     Dependence on supplemental oxygen     Oxygen at noc, during the day as needed    Depression     Diabetes mellitus (H)     Dry eye syndrome     Fibromyalgia     Ganglion     left wrist    GERD (gastroesophageal reflux disease)     Hyperlipidemia     Hypertension     Hypokalemia     Infective otitis externa, unspecified     Created by Conversion     Larynx edema     Lung disease     Malignant neoplasm of vulva (H)     Created by Conversion [facility] Annotation: Apr 17 2007  8:24AM - Cammy Bui:  resection per Dr. Alfonso Mane 9/06;  Replacement Utility updated for latest IMO load    Medial epicondylitis     Onychomycosis     Osteoarthritis     Peptic ulcer     Polyneuropathy     Vulvar malignant neoplasm (H)      Past Surgical History:   Procedure Laterality Date    BIOPSY BREAST Right     BIOPSY BREAST Right 01/28/2015    BIOPSY BREAST Right 01/28/2015    Procedure: RIGHT BREAST BIOPSY AFTER WIRE LOCALIZATION AT 0940;  Surgeon: Renée Soriano MD;  [place] [facility];  Service:     BIOPSY OF BREAST, INCISIONAL      Description: Incisional Breast Biopsy;  Recorded: 11/13/2007;  Comments: benign     COLONOSCOPY N/A 06/14/2019    Procedure: COLONOSCOPY;  Surgeon: Eudardo Mora MD;  Location: [facility] OR;  Service: Gastroenterology    CV CORONARY ANGIOGRAM N/A 02/08/2024    Procedure: CV CORONARY ANGIOGRAM;  Surgeon: Moises Valencia MD;  Location: [facility]    CV LEFT HEART CATH N/A 02/08/2024    Procedure: Left Heart Catheterization;  Surgeon: Moises Valencia MD;  Location: [facility]    CV RIGHT HEART CATH MEASUREMENTS RECORDED N/A 02/08/2024    Procedure: Right Heart Catheterization;  Surgeon: Moises Valencia MD;  Location: [facility]    CV TRANSCATHETER AORTIC VALVE REPLACEMENT-FEMORAL APPROACH N/A 02/20/2024    Procedure: Transcatheter Aortic Valve Replacement, possible cardiopulmonary bypass, possible surgical intervention;  Surgeon: Moises Valencia MD;  Location: [facility]    EP PACEMAKER DEVICE & IMPLANT- HIS LEAD DUAL N/A 3/7/2024    Procedure: Pacemaker Device & Lead Implant - HIS Lead Dual;  Surgeon: Donnell Leon MD;  Location: [facility]    ESOPHAGOSCOPY, GASTROSCOPY, DUODENOSCOPY (EGD), COMBINED N/A 11/06/2018    Procedure: ESOPHAGOGASTRODUODENOSCOPY;  Surgeon: Lit Fernando MD;  Location: [facility] OR;  Service:     HYSTERECTOMY      JOINT REPLACEMENT Left     TKA    OR TRANSCATHETER AORTIC VALVE REPLACEMENT, FEMORAL PERCUTANEOUS APPROACH (STANDBY) N/A 02/20/2024    Procedure: OR TRANSCATHETER AORTIC VALVE REPLACEMENT, FEMORAL PERCUTANEOUS APPROACH (STANDBY);  Surgeon: Ishmael Farias MD;  Location: [facility]    PICC TRIPLE LUMEN PLACEMENT  01/12/2023         [place] ABLATE HEART DYSRHYTHM FOCUS      Description: Catheter Ablation Atrial Fibrillation;  Recorded: 07/31/2012;  Comments: 7/24/12 PVI with Dr. Bansal to all 5 pulm veins and CTI fl ablation line as well.    TRANSCATHETER AORTIC-VALVE REPLACEMENT      ZZC SUPRACERV ABD HYSTERECTOMY      Description: Supracervical  Hysterectomy;  Proc Date: 01/01/1985;  Comments: some cervix left!; ovaries intact; done for bleeding     Family History   Problem Relation Age of Onset    Heart Failure Mother     Cancer Other         paternal HX-laryngeal     Alcoholism Sister     No Known Problems Daughter     No Known Problems Maternal Grandmother     No Known Problems Maternal Grandfather     No Known Problems Paternal Grandmother     No Known Problems Paternal Grandfather     No Known Problems Maternal Aunt     No Known Problems Paternal Aunt     Alcoholism Sister     Alcoholism Brother     Alcoholism Father     Cancer Paternal Uncle         Gastric-Alcohol    Cancer Paternal Uncle         gastric-Alcohol    Hereditary Breast and Ovarian Cancer Syndrome No family hx of     Breast Cancer No family hx of     Colon Cancer No family hx of     Endometrial Cancer No family hx of     Ovarian Cancer No family hx of     Social History     Socioeconomic History    Marital status:      Spouse name: Not on file    Number of children: Not on file    Years of education: Not on file    Highest education level: Not on file   Occupational History    Not on file   Tobacco Use    Smoking status: Some Days     Current packs/day: 0.25     Types: Cigarettes     Passive exposure: Never    Smokeless tobacco: Never    Tobacco comments:     seen by TTS inpatient on 3/31/22   Vaping Use    Vaping status: Never Used   Substance and Sexual Activity    Alcohol use: Yes     Comment: Alcoholic Drinks/day: very little    Drug use: No    Sexual activity: Not on file   Other Topics Concern    Not on file   Social History Narrative    Not on file     Social Determinants of Health     Financial Resource Strain: Low Risk  (12/26/2023)    Financial Resource Strain     Within the past 12 months, have you or your family members you live with been unable to get utilities (heat, electricity) when it was really needed?: No   Food Insecurity: Low Risk  (12/26/2023)    Food  "Insecurity     Within the past 12 months, did you worry that your food would run out before you got money to buy more?: No     Within the past 12 months, did the food you bought just not last and you didn t have money to get more?: No   Transportation Needs: Low Risk  (12/26/2023)    Transportation Needs     Within the past 12 months, has lack of transportation kept you from medical appointments, getting your medicines, non-medical meetings or appointments, work, or from getting things that you need?: No   Physical Activity: Not on file   Stress: Not on file   Social Connections: Not on file   Interpersonal Safety: Low Risk  (4/1/2024)    Interpersonal Safety     Do you feel physically and emotionally safe where you currently live?: Yes     Within the past 12 months, have you been hit, slapped, kicked or otherwise physically hurt by someone?: No     Within the past 12 months, have you been humiliated or emotionally abused in other ways by your partner or ex-partner?: No   Housing Stability: Low Risk  (12/26/2023)    Housing Stability     Do you have housing? : Yes     Are you worried about losing your housing?: No          Medications  Allergies   Current Outpatient Medications   Medication Sig Dispense Refill    ACCU-CHEK SOFTCLIX LANCETS lancets [ACCU-CHEK SOFTCLIX LANCETS LANCETS] TEST THREE TIMES DAILY 300 each 3    albuterol (PROAIR HFA/PROVENTIL HFA/VENTOLIN HFA) 108 (90 Base) MCG/ACT inhaler INHALE 2 PUFFS INTO THE LUNGS EVERY 4 HOURS AS NEEDED FOR WHEEZING 13.4 g 1    apixaban ANTICOAGULANT (ELIQUIS) 5 MG tablet Take 1 tablet (5 mg) by mouth 2 times daily 180 tablet 2    atorvastatin (LIPITOR) 20 MG tablet TAKE 1 TABLET(20 MG) BY MOUTH AT BEDTIME 90 tablet 3    BD ULTRA-FINE SHORT PEN NEEDLE 31 gauge x 5/16\" Ndle [BD ULTRA-FINE SHORT PEN NEEDLE 31 GAUGE X 5/16\" NDLE] TEST FOUR TIMES DAILY WITH MEALS AND AT BEDTIME 400 each 3    blood glucose (ONETOUCH VERIO IQ) test strip TEST THREE TIMES DAILY 300 strip 1 "    blood-glucose meter (ONETOUCH VERIO IQ METER) Misc [BLOOD-GLUCOSE METER (ONETOUCH VERIO IQ METER) MISC] Check blood sugar three times a day. 1 each 0    diaper,brief,adult,disposable (ADULT BRIEFS - LARGE) Misc [DIAPER,BRIEF,ADULT,DISPOSABLE (ADULT BRIEFS - LARGE) MISC] Use 3-4 daily as needed for incontinence 120 each 6    Emollient (EUCERIN ORIGINAL HEALING) LOTN APPLY LIBERALLY TO DRY SKIN TWICE DAILY AS NEEDED      furosemide (LASIX) 20 MG tablet Take 1 tablet (20 mg) by mouth daily 30 tablet 1    gabapentin (NEURONTIN) 600 MG tablet TAKE 1 TABLET(600 MG) BY MOUTH THREE TIMES DAILY 270 tablet 2    generic lancets (FINGERSTIX LANCETS) [GENERIC LANCETS (FINGERSTIX LANCETS)] Dispense brand per patient's insurance at pharmacy discretion. 300 each 0    ipratropium - albuterol 0.5 mg/2.5 mg/3 mL (DUONEB) 0.5-2.5 (3) MG/3ML neb solution INHALE 1 VIAL VIA NEBULIZER EVERY 6 HOURS AS NEEDED FOR SHORTNESS OF BREATH OR WHEEZING 90 mL 0    JARDIANCE 10 MG TABS tablet TAKE 1 TABLET(10 MG) BY MOUTH DAILY 90 tablet 2    magnesium oxide (MAG-OX) 400 MG tablet Take 1 tablet (400 mg) by mouth daily 30 tablet 0    meclizine (ANTIVERT) 25 MG tablet TAKE 1 TABLET(25 MG) BY MOUTH THREE TIMES DAILY AS NEEDED FOR DIZZINESS OR NAUSEA 45 tablet 5    metoprolol tartrate (LOPRESSOR) 25 MG tablet Take 1 tablet (25 mg) by mouth 2 times daily 60 tablet 3    mometasone-formoterol (DULERA) 200-5 MCG/ACT inhaler INHALE 2 PUFFS INTO THE LUNGS TWICE DAILY 39 g 3    naloxone (NARCAN) 4 MG/0.1ML nasal spray Spray 1 spray (4 mg) into one nostril alternating nostrils once as needed for opioid reversal every 2-3 minutes until assistance arrives 0.2 mL 0    neomycin-polymyxin-pramoxine (NEOSPORIN PLUS) 1 % external cream Apply topically to wound BID until healed. 28 g 0    Nutritional Supplements (ENSURE COMPLETE) LIQD Take 1 Can by mouth daily 7110 mL 11    nystatin (NYSTOP) 115830 UNIT/GM external powder APPLY TO THE AFFECTED AREA(S) 2-3 TIMES DAILY  "AS NEEDED 180 g 0    omeprazole (PRILOSEC) 20 MG DR capsule TAKE 1 CAPSULE(20 MG) BY MOUTH TWICE DAILY 180 capsule 1    oxyCODONE IR (ROXICODONE) 10 MG tablet Take 1 tablet (10 mg) by mouth every 6 hours as needed for moderate to severe pain 120 tablet 0    potassium chloride ER (KLOR-CON M) 20 MEQ CR tablet TAKE 1 TABLET(20 MEQ) BY MOUTH DAILY 90 tablet 3    sucralfate (CARAFATE) 1 GM tablet CRUSH ONE TABLET AND MIX WITH A LLITTLE WATER AND SWALLOW FOUR TIMES DAILY 360 tablet 3    SYMBICORT 160-4.5 MCG/ACT Inhaler INHALE 2 PUFFS INTO THE LUNGS TWICE DAILY 10.2 g 4    Allergies   Allergen Reactions    Celebrex [Celecoxib] Rash     patient had butterfly rash - \"lupus-like\"      Latex Rash         Lab Results    Chemistry/lipid CBC Cardiac Enzymes/BNP/TSH/INR   Recent Labs   Lab Test 09/20/23  1136   CHOL 167   HDL 75   LDL 75   TRIG 83     Recent Labs   Lab Test 09/20/23  1136 07/25/22  1026 12/24/21  0756   LDL 75 53 77     Recent Labs   Lab Test 03/07/24  1059      POTASSIUM 4.3   CHLORIDE 106   CO2 35*   GLC 83   BUN 12.1   CR 0.64   GFRESTIMATED >90   JOEY 9.2     Recent Labs   Lab Test 03/07/24  1059 02/29/24  1326 02/23/24  0446   CR 0.64 0.76 0.66     Recent Labs   Lab Test 02/06/24  1953 09/20/23  1136 05/25/23  0507   A1C 6.2* 6.1* 6.1*    Recent Labs   Lab Test 03/07/24  1059   WBC 7.0   HGB 11.8   HCT 37.3   MCV 95   *     Recent Labs   Lab Test 03/07/24  1059 02/23/24  0446 02/22/24  0454   HGB 11.8 12.5 11.6*    Recent Labs   Lab Test 05/24/23  1322 05/24/23  1015 02/08/23  0759   TROPONINI 0.07 0.08 0.09     Recent Labs   Lab Test 02/19/24  1340 02/06/24  1953 06/02/23  0057 05/24/23  1015 02/08/23  0759   BNP  --   --  136* 176* 867*   NTBNPI  --  534  --   --   --    NTBNP 641  --   --   --   --      Recent Labs   Lab Test 05/24/23  1015   TSH 1.17     Recent Labs   Lab Test 02/19/24  1340 02/08/23  0759 01/18/23  0522   INR 1.04 1.34* 1.35*        40 minutes spent on the date of encounter " doing chart review, review of outside records, review of test results, interpretation with above tests, patient visit, and documentation.      This note has been dictated using voice recognition software. Any grammatical or context distortions are unintentional and inherent to the software.    Margy Rea PA-C  Structural Heart Program  [facility]               Thank you for allowing me to participate in the care of your patient.      Sincerely,     Margy Rea PA-C     [facility]  cc:   Margy Rea PA-C  [facility]  [address], [address]  [place] [address]

## 2024-04-25 LAB
ATRIAL RATE - MUSE: 67 BPM
DIASTOLIC BLOOD PRESSURE - MUSE: NORMAL MMHG
INTERPRETATION ECG - MUSE: NORMAL
P AXIS - MUSE: 75 DEGREES
PR INTERVAL - MUSE: 186 MS
QRS DURATION - MUSE: 104 MS
QT - MUSE: 406 MS
QTC - MUSE: 429 MS
R AXIS - MUSE: -49 DEGREES
SYSTOLIC BLOOD PRESSURE - MUSE: NORMAL MMHG
T AXIS - MUSE: 66 DEGREES
VENTRICULAR RATE- MUSE: 67 BPM

## 2024-04-26 DIAGNOSIS — J43.9 PULMONARY EMPHYSEMA, UNSPECIFIED EMPHYSEMA TYPE (H): ICD-10-CM

## 2024-04-26 RX ORDER — BUDESONIDE AND FORMOTEROL FUMARATE DIHYDRATE 160; 4.5 UG/1; UG/1
2 AEROSOL RESPIRATORY (INHALATION) 2 TIMES DAILY
Qty: 10.2 G | Refills: 4 | Status: SHIPPED | OUTPATIENT
Start: 2024-04-26

## 2024-04-29 ENCOUNTER — OFFICE VISIT (OUTPATIENT)
Dept: CARDIOLOGY | Facility: CLINIC | Age: 74
End: 2024-04-29
Attending: NURSE PRACTITIONER
Payer: COMMERCIAL

## 2024-04-29 ENCOUNTER — ANCILLARY PROCEDURE (OUTPATIENT)
Dept: CARDIOLOGY | Facility: CLINIC | Age: 74
End: 2024-04-29
Attending: INTERNAL MEDICINE
Payer: COMMERCIAL

## 2024-04-29 VITALS
BODY MASS INDEX: 23.4 KG/M2 | SYSTOLIC BLOOD PRESSURE: 112 MMHG | DIASTOLIC BLOOD PRESSURE: 52 MMHG | WEIGHT: 145 LBS | RESPIRATION RATE: 16 BRPM | HEART RATE: 66 BPM | OXYGEN SATURATION: 95 %

## 2024-04-29 DIAGNOSIS — I48.0 PAROXYSMAL ATRIAL FIBRILLATION (H): Primary | ICD-10-CM

## 2024-04-29 DIAGNOSIS — I49.5 SINUS NODE DYSFUNCTION (H): ICD-10-CM

## 2024-04-29 DIAGNOSIS — Z95.0 CARDIAC PACEMAKER IN SITU: ICD-10-CM

## 2024-04-29 DIAGNOSIS — I50.32 CHRONIC HEART FAILURE WITH PRESERVED EJECTION FRACTION (H): Primary | ICD-10-CM

## 2024-04-29 DIAGNOSIS — I50.33 ACUTE ON CHRONIC DIASTOLIC HEART FAILURE (H): ICD-10-CM

## 2024-04-29 DIAGNOSIS — I49.5 SICK SINUS SYNDROME (H): ICD-10-CM

## 2024-04-29 LAB
MDC_IDC_LEAD_CONNECTION_STATUS: NORMAL
MDC_IDC_LEAD_CONNECTION_STATUS: NORMAL
MDC_IDC_LEAD_IMPLANT_DT: NORMAL
MDC_IDC_LEAD_IMPLANT_DT: NORMAL
MDC_IDC_LEAD_LOCATION: NORMAL
MDC_IDC_LEAD_LOCATION: NORMAL
MDC_IDC_LEAD_LOCATION_DETAIL_1: NORMAL
MDC_IDC_LEAD_LOCATION_DETAIL_1: NORMAL
MDC_IDC_LEAD_MFG: NORMAL
MDC_IDC_LEAD_MFG: NORMAL
MDC_IDC_LEAD_MODEL: NORMAL
MDC_IDC_LEAD_MODEL: NORMAL
MDC_IDC_LEAD_POLARITY_TYPE: NORMAL
MDC_IDC_LEAD_POLARITY_TYPE: NORMAL
MDC_IDC_LEAD_SERIAL: NORMAL
MDC_IDC_LEAD_SERIAL: NORMAL
MDC_IDC_LEAD_SPECIAL_FUNCTION: NORMAL
MDC_IDC_LEAD_SPECIAL_FUNCTION: NORMAL
MDC_IDC_MSMT_BATTERY_DTM: NORMAL
MDC_IDC_MSMT_BATTERY_REMAINING_LONGEVITY: 174 MO
MDC_IDC_MSMT_BATTERY_RRT_TRIGGER: 2.62
MDC_IDC_MSMT_BATTERY_STATUS: NORMAL
MDC_IDC_MSMT_BATTERY_VOLTAGE: 3.21 V
MDC_IDC_MSMT_LEADCHNL_RA_IMPEDANCE_VALUE: 361 OHM
MDC_IDC_MSMT_LEADCHNL_RA_IMPEDANCE_VALUE: 437 OHM
MDC_IDC_MSMT_LEADCHNL_RA_PACING_THRESHOLD_AMPLITUDE: 0.5 V
MDC_IDC_MSMT_LEADCHNL_RA_PACING_THRESHOLD_PULSEWIDTH: 0.4 MS
MDC_IDC_MSMT_LEADCHNL_RA_SENSING_INTR_AMPL: 3.3 MV
MDC_IDC_MSMT_LEADCHNL_RV_IMPEDANCE_VALUE: 437 OHM
MDC_IDC_MSMT_LEADCHNL_RV_IMPEDANCE_VALUE: 665 OHM
MDC_IDC_MSMT_LEADCHNL_RV_PACING_THRESHOLD_AMPLITUDE: 1 V
MDC_IDC_MSMT_LEADCHNL_RV_PACING_THRESHOLD_PULSEWIDTH: 0.4 MS
MDC_IDC_MSMT_LEADCHNL_RV_SENSING_INTR_AMPL: 20 MV
MDC_IDC_PG_IMPLANT_DTM: NORMAL
MDC_IDC_PG_MFG: NORMAL
MDC_IDC_PG_MODEL: NORMAL
MDC_IDC_PG_SERIAL: NORMAL
MDC_IDC_PG_TYPE: NORMAL
MDC_IDC_SESS_CLINIC_NAME: NORMAL
MDC_IDC_SESS_DTM: NORMAL
MDC_IDC_SESS_TYPE: NORMAL
MDC_IDC_SET_BRADY_AT_MODE_SWITCH_RATE: 171 {BEATS}/MIN
MDC_IDC_SET_BRADY_HYSTRATE: NORMAL
MDC_IDC_SET_BRADY_LOWRATE: 60 {BEATS}/MIN
MDC_IDC_SET_BRADY_MAX_SENSOR_RATE: 130 {BEATS}/MIN
MDC_IDC_SET_BRADY_MAX_TRACKING_RATE: 130 {BEATS}/MIN
MDC_IDC_SET_BRADY_MODE: NORMAL
MDC_IDC_SET_BRADY_PAV_DELAY_LOW: 150 MS
MDC_IDC_SET_BRADY_SAV_DELAY_LOW: 120 MS
MDC_IDC_SET_LEADCHNL_RA_PACING_AMPLITUDE: NORMAL
MDC_IDC_SET_LEADCHNL_RA_PACING_ANODE_ELECTRODE_1: NORMAL
MDC_IDC_SET_LEADCHNL_RA_PACING_ANODE_LOCATION_1: NORMAL
MDC_IDC_SET_LEADCHNL_RA_PACING_CAPTURE_MODE: NORMAL
MDC_IDC_SET_LEADCHNL_RA_PACING_CATHODE_ELECTRODE_1: NORMAL
MDC_IDC_SET_LEADCHNL_RA_PACING_CATHODE_LOCATION_1: NORMAL
MDC_IDC_SET_LEADCHNL_RA_PACING_POLARITY: NORMAL
MDC_IDC_SET_LEADCHNL_RA_PACING_PULSEWIDTH: 0.4 MS
MDC_IDC_SET_LEADCHNL_RA_SENSING_ANODE_ELECTRODE_1: NORMAL
MDC_IDC_SET_LEADCHNL_RA_SENSING_ANODE_LOCATION_1: NORMAL
MDC_IDC_SET_LEADCHNL_RA_SENSING_CATHODE_ELECTRODE_1: NORMAL
MDC_IDC_SET_LEADCHNL_RA_SENSING_CATHODE_LOCATION_1: NORMAL
MDC_IDC_SET_LEADCHNL_RA_SENSING_POLARITY: NORMAL
MDC_IDC_SET_LEADCHNL_RA_SENSING_SENSITIVITY: 0.3 MV
MDC_IDC_SET_LEADCHNL_RV_PACING_AMPLITUDE: NORMAL
MDC_IDC_SET_LEADCHNL_RV_PACING_ANODE_ELECTRODE_1: NORMAL
MDC_IDC_SET_LEADCHNL_RV_PACING_ANODE_LOCATION_1: NORMAL
MDC_IDC_SET_LEADCHNL_RV_PACING_CAPTURE_MODE: NORMAL
MDC_IDC_SET_LEADCHNL_RV_PACING_CATHODE_ELECTRODE_1: NORMAL
MDC_IDC_SET_LEADCHNL_RV_PACING_CATHODE_LOCATION_1: NORMAL
MDC_IDC_SET_LEADCHNL_RV_PACING_POLARITY: NORMAL
MDC_IDC_SET_LEADCHNL_RV_PACING_PULSEWIDTH: 0.4 MS
MDC_IDC_SET_LEADCHNL_RV_SENSING_ANODE_ELECTRODE_1: NORMAL
MDC_IDC_SET_LEADCHNL_RV_SENSING_ANODE_LOCATION_1: NORMAL
MDC_IDC_SET_LEADCHNL_RV_SENSING_CATHODE_ELECTRODE_1: NORMAL
MDC_IDC_SET_LEADCHNL_RV_SENSING_CATHODE_LOCATION_1: NORMAL
MDC_IDC_SET_LEADCHNL_RV_SENSING_POLARITY: NORMAL
MDC_IDC_SET_LEADCHNL_RV_SENSING_SENSITIVITY: 0.9 MV
MDC_IDC_SET_ZONE_DETECTION_INTERVAL: 350 MS
MDC_IDC_SET_ZONE_DETECTION_INTERVAL: 400 MS
MDC_IDC_SET_ZONE_STATUS: NORMAL
MDC_IDC_SET_ZONE_STATUS: NORMAL
MDC_IDC_SET_ZONE_TYPE: NORMAL
MDC_IDC_SET_ZONE_VENDOR_TYPE: NORMAL
MDC_IDC_STAT_AT_BURDEN_PERCENT: 0 %
MDC_IDC_STAT_AT_DTM_END: NORMAL
MDC_IDC_STAT_AT_DTM_START: NORMAL
MDC_IDC_STAT_AT_MODE_SW_COUNT: 0
MDC_IDC_STAT_BRADY_AP_VP_PERCENT: 0.02 %
MDC_IDC_STAT_BRADY_AP_VS_PERCENT: 46.65 %
MDC_IDC_STAT_BRADY_AS_VP_PERCENT: 0.01 %
MDC_IDC_STAT_BRADY_AS_VS_PERCENT: 53.31 %
MDC_IDC_STAT_BRADY_DTM_END: NORMAL
MDC_IDC_STAT_BRADY_DTM_START: NORMAL
MDC_IDC_STAT_BRADY_RA_PERCENT_PACED: 46.39 %
MDC_IDC_STAT_BRADY_RV_PERCENT_PACED: 0.03 %
MDC_IDC_STAT_EPISODE_RECENT_COUNT: 0
MDC_IDC_STAT_EPISODE_RECENT_COUNT_DTM_END: NORMAL
MDC_IDC_STAT_EPISODE_RECENT_COUNT_DTM_START: NORMAL
MDC_IDC_STAT_EPISODE_TOTAL_COUNT: 0
MDC_IDC_STAT_EPISODE_TOTAL_COUNT_DTM_END: NORMAL
MDC_IDC_STAT_EPISODE_TOTAL_COUNT_DTM_START: NORMAL
MDC_IDC_STAT_EPISODE_TYPE: NORMAL
MDC_IDC_STAT_TACHYTHERAPY_RECENT_DTM_END: NORMAL
MDC_IDC_STAT_TACHYTHERAPY_RECENT_DTM_START: NORMAL
MDC_IDC_STAT_TACHYTHERAPY_TOTAL_DTM_END: NORMAL
MDC_IDC_STAT_TACHYTHERAPY_TOTAL_DTM_START: NORMAL

## 2024-04-29 PROCEDURE — 99214 OFFICE O/P EST MOD 30 MIN: CPT | Performed by: NURSE PRACTITIONER

## 2024-04-29 PROCEDURE — 93280 PM DEVICE PROGR EVAL DUAL: CPT | Performed by: INTERNAL MEDICINE

## 2024-04-29 RX ORDER — FUROSEMIDE 20 MG
40 TABLET ORAL DAILY
Qty: 180 TABLET | Refills: 1 | Status: SHIPPED | OUTPATIENT
Start: 2024-04-29

## 2024-04-29 RX ORDER — DILTIAZEM HYDROCHLORIDE 120 MG/1
120 CAPSULE, COATED, EXTENDED RELEASE ORAL DAILY
COMMUNITY
Start: 2024-04-11 | End: 2024-07-12

## 2024-04-29 NOTE — PROGRESS NOTES
Assessment/Recommendations   Assessment:    1. Heart failure with preserved ejection fraction, NYHA class III: Decompensated.  She has fatigue, dyspnea on exertion and weight gain.  Her weight has increased approximately 15 pounds in the 2 months.  She does not follow a low-sodium diet.  She does not monitor her weights.  We discussed both of these in detail today.  2.  Severe aortic stenosis: Status post TAVR February 2024.  Recent echocardiogram showed mean gradient 8 mmHg  3.  Hypertension: Blood pressure well-controlled 112/52  4.  Paroxysmal atrial fibrillation: Device check today shows no atrial fibrillation.  She continues Eliquis for anticoagulation.  Next device check scheduled August 1  5.  Sinus node dysfunction: She wore a MCOT which showed a 4-second pause.  Permanent pacemaker implanted March 7, 2024    Plan:  1.  Increase Lasix to 40 mg daily  2.  Start monitoring daily weights and stressed importance of following a low-sodium diet  3.  Fluid restriction of 50 to 60 ounces per day    Mary Kay Tejada will follow up with Dr. Pizano June 7 and in the heart failure clinic in 2 to 3 weeks.     History of Present Illness/Subjective    Ms. Mary Kay Tejada is a 74 year old female seen at [facility] heart failure clinic today for continued follow-up.  Her son accompanies her today.  She follows up for heart failure with preserved ejection fraction.  She underwent TAVR on February 20, 2024.  Recent echocardiogram April 17 showed ejection fraction of 55 to 60% with mean gradient of aortic valve 8 mmHg.  Postprocedure she was noted to have QRS widening and first-degree AV block.  It was recommended she discharge with MCOT.  She had a 4-second pause and underwent permanent pacemaker on March 7.  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  Status post TAVR February 20, 2024.  Status post permanent pacemaker March 7, 2024    Today, she has fatigue and dyspnea on exertion.  She denies lightheadedness, orthopnea, PND, palpitations, chest pain, abdominal fullness/bloating, and lower extremity edema.      She does not weigh herself.  She does not follow a low-sodium diet.  She is inactive.      ECHOCARDIOGRAM: 4/17/2024-reviewed  Interpretation Summary     1. Normal left ventricular size and systolic performance with a visually  estimated ejection fraction of 55-60%.  2. There is a bio-prosthetic aortic valve (documented 26 mm Douglas Mai 3  Resilia tissue valve).  Â  Normal aortic valve prosthesis metrics with a mean systolic gradient of 8  mmHg and a peak anterograde velocity of 1.9 m/sec.  Â  No aortic insufficiency is detected.  3. Normal right ventricular size and systolic performance.  4. There is mild left atrial enlargement.     Physical Examination Review of Systems   /52 (BP Location: Right arm, Patient Position: Sitting, Cuff Size: Adult Regular)   Pulse 66   Resp 16   Wt 65.8 kg (145 lb)   LMP  (LMP Unknown)   SpO2 95%   BMI 23.40 kg/m    Body mass index is 23.4 kg/m .  Wt Readings from Last 3 Encounters:   04/29/24 65.8 kg (145 lb)   04/24/24 66.7 kg (147 lb)   03/05/24 60.8 kg (134 lb)       General Appearance:   no acute distress   ENT/Mouth: No abnormalities   EYES:  no scleral icterus, normal conjunctivae   Neck: no thyromegaly   Chest/Lungs:   lungs are decreased to auscultation, no rales or wheezing, equal chest wall expansion    Cardiovascular:   Regular. Normal first and second heart sounds with no murmurs, rubs, or gallops, no edema bilaterally    Abdomen:  bowel sounds are present   Extremities: no cyanosis or clubbing   Skin: warm   Neurologic: no tremors     Psychiatric: alert and oriented x3                                              Medical History  Surgical History Family History  Social History   Past Medical History:   Diagnosis Date    Anemia     Aortic stenosis     Aortic valve disorder     Atrial fibrillation (H)     Atrial flutter (H)     Benign neoplasm of adenomatous polyp     large intestine     Chronic constipation     Chronic heart failure with preserved ejection fraction (H) 02/29/2024    Chronic pain syndrome     Congestive heart failure (H)     COPD (chronic obstructive pulmonary disease) (H)     Oxygen at night     Dependence on supplemental oxygen     Oxygen at noc, during the day as needed    Depression     Diabetes mellitus (H)     Dry eye syndrome     Fibromyalgia     Ganglion     left wrist    GERD (gastroesophageal reflux disease)     Hyperlipidemia     Hypertension     Hypokalemia     Infective otitis externa, unspecified     Created by Conversion     Larynx edema     Lung disease     Malignant neoplasm of vulva (H)     Created by Conversion [facility] Annotation: Apr 17 2007  8:24AM - Cammy Bui:  resection per Dr. Alfonso Mane 9/06;  Replacement Utility updated for latest IMO load    Medial epicondylitis     Onychomycosis     Osteoarthritis     Peptic ulcer     Polyneuropathy     Vulvar malignant neoplasm (H)     Past Surgical History:   Procedure Laterality Date    BIOPSY BREAST Right     BIOPSY BREAST Right 01/28/2015    BIOPSY BREAST Right 01/28/2015    Procedure: RIGHT BREAST BIOPSY AFTER WIRE LOCALIZATION AT 0940;  Surgeon: Renée Soriano MD;  Location: [facility];  Service:     BIOPSY OF BREAST, INCISIONAL      Description: Incisional Breast Biopsy;  Recorded: 11/13/2007;  Comments: benign    COLONOSCOPY N/A 06/14/2019    Procedure: COLONOSCOPY;  Surgeon: Eduardo Mora MD;  Location: [facility];  Service: Gastroenterology    CV CORONARY ANGIOGRAM N/A 02/08/2024    Procedure: CV CORONARY ANGIOGRAM;  Surgeon: Moises Valencia MD;  Location: [facility] CATH LAB CV    CV LEFT HEART CATH N/A 02/08/2024    Procedure: Left Heart  Catheterization;  Surgeon: Moises Valencia MD;  Location: [facility]    CV RIGHT HEART CATH MEASUREMENTS RECORDED N/A 02/08/2024    Procedure: Right Heart Catheterization;  Surgeon: Moises Valencia MD;  Location: [facility]    CV TRANSCATHETER AORTIC VALVE REPLACEMENT-FEMORAL APPROACH N/A 02/20/2024    Procedure: Transcatheter Aortic Valve Replacement, possible cardiopulmonary bypass, possible surgical intervention;  Surgeon: Moises Valencia MD;  Location: [facility]    EP PACEMAKER DEVICE & IMPLANT- HIS LEAD DUAL N/A 3/7/2024    Procedure: Pacemaker Device & Lead Implant - HIS Lead Dual;  Surgeon: Donnell Leon MD;  Location: [facility]    ESOPHAGOSCOPY, GASTROSCOPY, DUODENOSCOPY (EGD), COMBINED N/A 11/06/2018    Procedure: ESOPHAGOGASTRODUODENOSCOPY;  Surgeon: Lit Fernadno MD;  Location: [facility];  Service:     HYSTERECTOMY      JOINT REPLACEMENT Left     TKA    OR TRANSCATHETER AORTIC VALVE REPLACEMENT, FEMORAL PERCUTANEOUS APPROACH (STANDBY) N/A 02/20/2024    Procedure: OR TRANSCATHETER AORTIC VALVE REPLACEMENT, FEMORAL PERCUTANEOUS APPROACH (STANDBY);  Surgeon: Ishmael Farias MD;  Location: [facility]    PICC TRIPLE LUMEN PLACEMENT  01/12/2023         ID ABLATE HEART DYSRHYTHM FOCUS      Description: Catheter Ablation Atrial Fibrillation;  Recorded: 07/31/2012;  Comments: 7/24/12 PVI with Dr. Gardiner and nilay to all 5 pulm veins and CTI fl ablation line as well.    TRANSCATHETER AORTIC-VALVE REPLACEMENT      ZZC SUPRACERV ABD HYSTERECTOMY      Description: Supracervical Hysterectomy;  Proc Date: 01/01/1985;  Comments: some cervix left!; ovaries intact; done for bleeding    Family History   Problem Relation Age of Onset    Heart Failure Mother     Cancer Other         paternal HX-laryngeal     Alcoholism Sister     No Known Problems Daughter     No Known Problems Maternal Grandmother     No Known Problems Maternal  Grandfather     No Known Problems Paternal Grandmother     No Known Problems Paternal Grandfather     No Known Problems Maternal Aunt     No Known Problems Paternal Aunt     Alcoholism Sister     Alcoholism Brother     Alcoholism Father     Cancer Paternal Uncle         Gastric-Alcohol    Cancer Paternal Uncle         gastric-Alcohol    Hereditary Breast and Ovarian Cancer Syndrome No family hx of     Breast Cancer No family hx of     Colon Cancer No family hx of     Endometrial Cancer No family hx of     Ovarian Cancer No family hx of     Social History     Socioeconomic History    Marital status:      Spouse name: Not on file    Number of children: Not on file    Years of education: Not on file    Highest education level: Not on file   Occupational History    Not on file   Tobacco Use    Smoking status: Some Days     Current packs/day: 0.25     Types: Cigarettes     Passive exposure: Never    Smokeless tobacco: Never    Tobacco comments:     seen by [facility]TS inpatient on 3/31/22   Vaping Use    Vaping status: Never Used   Substance and Sexual Activity    Alcohol use: Yes     Comment: Alcoholic Drinks/day: very little    Drug use: No    Sexual activity: Not on file   Other Topics Concern    Not on file   Social History Narrative    Not on file     Social Determinants of Health     Financial Resource Strain: Low Risk  (12/26/2023)    Financial Resource Strain     Within the past 12 months, have you or your family members you live with been unable to get utilities (heat, electricity) when it was really needed?: No   Food Insecurity: Low Risk  (12/26/2023)    Food Insecurity     Within the past 12 months, did you worry that your food would run out before you got money to buy more?: No     Within the past 12 months, did the food you bought just not last and you didn t have money to get more?: No   Transportation Needs: Low Risk  (12/26/2023)    Transportation Needs     Within the past 12 months, has lack of  "transportation kept you from medical appointments, getting your medicines, non-medical meetings or appointments, work, or from getting things that you need?: No   Physical Activity: Not on file   Stress: Not on file   Social Connections: Not on file   Interpersonal Safety: Low Risk  (4/1/2024)    Interpersonal Safety     Do you feel physically and emotionally safe where you currently live?: Yes     Within the past 12 months, have you been hit, slapped, kicked or otherwise physically hurt by someone?: No     Within the past 12 months, have you been humiliated or emotionally abused in other ways by your partner or ex-partner?: No   Housing Stability: Low Risk  (12/26/2023)    Housing Stability     Do you have housing? : Yes     Are you worried about losing your housing?: No          Medications  Allergies   Current Outpatient Medications   Medication Sig Dispense Refill    albuterol (PROAIR HFA/PROVENTIL HFA/VENTOLIN HFA) 108 (90 Base) MCG/ACT inhaler INHALE 2 PUFFS INTO THE LUNGS EVERY 4 HOURS AS NEEDED FOR WHEEZING 13.4 g 1    apixaban ANTICOAGULANT (ELIQUIS) 5 MG tablet Take 1 tablet (5 mg) by mouth 2 times daily 180 tablet 2    atorvastatin (LIPITOR) 20 MG tablet TAKE 1 TABLET(20 MG) BY MOUTH AT BEDTIME 90 tablet 3    BD ULTRA-FINE SHORT PEN NEEDLE 31 gauge x 5/16\" Ndle [BD ULTRA-FINE SHORT PEN NEEDLE 31 GAUGE X 5/16\" NDLE] TEST FOUR TIMES DAILY WITH MEALS AND AT BEDTIME 400 each 3    budesonide-formoterol (SYMBICORT) 160-4.5 MCG/ACT Inhaler INHALE 2 PUFFS INTO THE LUNGS TWICE DAILY 10.2 g 4    diltiazem ER COATED BEADS (CARDIZEM CD/CARTIA XT) 120 MG 24 hr capsule Take 120 mg by mouth daily      Emollient (EUCERIN ORIGINAL HEALING) LOTN APPLY LIBERALLY TO DRY SKIN TWICE DAILY AS NEEDED      furosemide (LASIX) 20 MG tablet Take 2 tablets (40 mg) by mouth daily 180 tablet 1    gabapentin (NEURONTIN) 600 MG tablet TAKE 1 TABLET(600 MG) BY MOUTH THREE TIMES DAILY 270 tablet 2    generic lancets (FINGERSTIX LANCETS) " [GENERIC LANCETS (FINGERSTIX LANCETS)] Dispense brand per patient's insurance at pharmacy discretion. 300 each 0    JARDIANCE 10 MG TABS tablet TAKE 1 TABLET(10 MG) BY MOUTH DAILY 90 tablet 2    magnesium oxide (MAG-OX) 400 MG tablet Take 1 tablet (400 mg) by mouth daily 30 tablet 0    metoprolol tartrate (LOPRESSOR) 25 MG tablet Take 1 tablet (25 mg) by mouth 2 times daily 60 tablet 3    neomycin-polymyxin-pramoxine (NEOSPORIN PLUS) 1 % external cream Apply topically to wound BID until healed. 28 g 0    nystatin (NYSTOP) 685688 UNIT/GM external powder APPLY TO THE AFFECTED AREA(S) 2-3 TIMES DAILY AS NEEDED 180 g 0    omeprazole (PRILOSEC) 20 MG DR capsule TAKE 1 CAPSULE(20 MG) BY MOUTH TWICE DAILY 180 capsule 1    oxyCODONE IR (ROXICODONE) 10 MG tablet Take 1 tablet (10 mg) by mouth every 6 hours as needed for moderate to severe pain 120 tablet 0    potassium chloride ER (KLOR-CON M) 20 MEQ CR tablet TAKE 1 TABLET(20 MEQ) BY MOUTH DAILY 90 tablet 3    sucralfate (CARAFATE) 1 GM tablet CRUSH ONE TABLET AND MIX WITH A LLITTLE WATER AND SWALLOW FOUR TIMES DAILY 360 tablet 3    ACCU-CHEK SOFTCLIX LANCETS lancets [ACCU-CHEK SOFTCLIX LANCETS LANCETS] TEST THREE TIMES DAILY (Patient not taking: Reported on 4/29/2024) 300 each 3    blood glucose (ONETOUCH VERIO IQ) test strip TEST THREE TIMES DAILY (Patient not taking: Reported on 4/29/2024) 300 strip 1    blood-glucose meter (ONETOUCH VERIO IQ METER) Misc [BLOOD-GLUCOSE METER (ONETOUCH VERIO IQ METER) MISC] Check blood sugar three times a day. (Patient not taking: Reported on 4/29/2024) 1 each 0    diaper,brief,adult,disposable (ADULT BRIEFS - LARGE) Misc [DIAPER,BRIEF,ADULT,DISPOSABLE (ADULT BRIEFS - LARGE) MISC] Use 3-4 daily as needed for incontinence (Patient not taking: Reported on 4/29/2024) 120 each 6    ipratropium - albuterol 0.5 mg/2.5 mg/3 mL (DUONEB) 0.5-2.5 (3) MG/3ML neb solution INHALE 1 VIAL VIA NEBULIZER EVERY 6 HOURS AS NEEDED FOR SHORTNESS OF BREATH OR  "WHEEZING (Patient not taking: Reported on 4/29/2024) 90 mL 0    meclizine (ANTIVERT) 25 MG tablet TAKE 1 TABLET(25 MG) BY MOUTH THREE TIMES DAILY AS NEEDED FOR DIZZINESS OR NAUSEA (Patient not taking: Reported on 4/29/2024) 45 tablet 5    mometasone-formoterol (DULERA) 200-5 MCG/ACT inhaler INHALE 2 PUFFS INTO THE LUNGS TWICE DAILY (Patient not taking: Reported on 4/29/2024) 39 g 3    naloxone (NARCAN) 4 MG/0.1ML nasal spray Spray 1 spray (4 mg) into one nostril alternating nostrils once as needed for opioid reversal every 2-3 minutes until assistance arrives (Patient not taking: Reported on 4/29/2024) 0.2 mL 0    Nutritional Supplements (ENSURE COMPLETE) LIQD Take 1 Can by mouth daily (Patient not taking: Reported on 4/29/2024) 7110 mL 11    Allergies   Allergen Reactions    Celebrex [Celecoxib] Rash     patient had butterfly rash - \"lupus-like\"      Latex Rash         Lab Results    Chemistry/lipid CBC Cardiac Enzymes/BNP/TSH/INR   Lab Results   Component Value Date    CHOL 167 09/20/2023    HDL 75 09/20/2023    TRIG 83 09/20/2023    BUN 20.4 04/24/2024     04/24/2024    CO2 30 (H) 04/24/2024    Lab Results   Component Value Date    WBC 7.0 04/24/2024    HGB 11.7 04/24/2024    HCT 39.3 04/24/2024    MCV 95 04/24/2024     04/24/2024    Lab Results   Component Value Date    TROPONINI 0.07 05/24/2023     (H) 06/02/2023    TSH 1.17 05/24/2023    INR 1.04 02/19/2024             This note has been dictated using voice recognition software. Any grammatical, typographical, or context distortions are unintentional and inherent to the software    30 minutes spent on the date of encounter doing chart review, review of outside records, review of test results, interpretation with above tests, patient visit, documentation, and discussion with family.                  "

## 2024-04-29 NOTE — PATIENT INSTRUCTIONS
Mary Kay Tejada,    It was a pleasure to see you today at [facility].     My recommendations after this visit include:  - Please follow up with Dr Pizano June 7   - Please follow up with Alexandria Lynn in 2-3 weeks  - Increase furosemide to 40 mg (2 tablets) daily  - Monitor sodium intake and stay around 2,000 mg per day or 600 mg per meal  - Drink 60 oz of total fluids or less per day    Alexandria Lynn, CNP

## 2024-04-29 NOTE — LETTER
4/29/2024    SAVANA SILVER MD  [address] [facility] [address]    RE: Mary Kay Tejada       Dear Colleague,     I had the pleasure of seeing Mary Kay Tejada in the [facility].        Assessment/Recommendations   Assessment:    1. Heart failure with preserved ejection fraction, NYHA class III: Decompensated.  She has fatigue, dyspnea on exertion and weight gain.  Her weight has increased approximately 15 pounds in the 2 months.  She does not follow a low-sodium diet.  She does not monitor her weights.  We discussed both of these in detail today.  2.  Severe aortic stenosis: Status post TAVR February 2024.  Recent echocardiogram showed mean gradient 8 mmHg  3.  Hypertension: Blood pressure well-controlled 112/52  4.  Paroxysmal atrial fibrillation: Device check today shows no atrial fibrillation.  She continues Eliquis for anticoagulation.  Next device check scheduled August 1  5.  Sinus node dysfunction: She wore a MCOT which showed a 4-second pause.  Permanent pacemaker implanted March 7, 2024    Plan:  1.  Increase Lasix to 40 mg daily  2.  Start monitoring daily weights and stressed importance of following a low-sodium diet  3.  Fluid restriction of 50 to 60 ounces per day    Mary Kay Tejada will follow up with Dr. Pizano June 7 and in the heart failure clinic in 2 to 3 weeks.     History of Present Illness/Subjective    Ms. Mary Kay Tejada is a 74 year old female seen at [facility] heart failure clinic today for continued follow-up.  Her son accompanies her today.  She follows up for heart failure with preserved ejection fraction.  She underwent TAVR on February 20, 2024.  Recent echocardiogram April 17 showed ejection fraction of 55 to 60% with mean gradient of aortic valve 8 mmHg.  Postprocedure she was noted to have QRS widening and first-degree AV block.  It was recommended she discharge with MCOT.  She had a 4-second pause and underwent permanent pacemaker on  March 7.  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  Status post TAVR February 20, 2024.  Status post permanent pacemaker March 7, 2024    Today, she has fatigue and dyspnea on exertion.  She denies lightheadedness, orthopnea, PND, palpitations, chest pain, abdominal fullness/bloating, and lower extremity edema.      She does not weigh herself.  She does not follow a low-sodium diet.  She is inactive.      ECHOCARDIOGRAM: 4/17/2024-reviewed  Interpretation Summary     1. Normal left ventricular size and systolic performance with a visually  estimated ejection fraction of 55-60%.  2. There is a bio-prosthetic aortic valve (documented 26 mm Douglas Mai 3  Resilia tissue valve).  Â  Normal aortic valve prosthesis metrics with a mean systolic gradient of 8  mmHg and a peak anterograde velocity of 1.9 m/sec.  Â  No aortic insufficiency is detected.  3. Normal right ventricular size and systolic performance.  4. There is mild left atrial enlargement.     Physical Examination Review of Systems   /52 (BP Location: Right arm, Patient Position: Sitting, Cuff Size: Adult Regular)   Pulse 66   Resp 16   Wt 65.8 kg (145 lb)   LMP  (LMP Unknown)   SpO2 95%   BMI 23.40 kg/m    Body mass index is 23.4 kg/m .  Wt Readings from Last 3 Encounters:   04/29/24 65.8 kg (145 lb)   04/24/24 66.7 kg (147 lb)   03/05/24 60.8 kg (134 lb)       General Appearance:   no acute distress   ENT/Mouth: No abnormalities   EYES:  no scleral icterus, normal conjunctivae   Neck: no thyromegaly   Chest/Lungs:   lungs are decreased to auscultation, no rales or wheezing, equal chest wall expansion    Cardiovascular:   Regular. Normal first and second heart sounds with no murmurs, rubs, or gallops, no edema bilaterally    Abdomen:  bowel sounds are present    Extremities: no cyanosis or clubbing   Skin: warm   Neurologic: no tremors     Psychiatric: alert and oriented x3                                              Medical History  Surgical History Family History Social History   Past Medical History:   Diagnosis Date    Anemia     Aortic stenosis     Aortic valve disorder     Atrial fibrillation (H)     Atrial flutter (H)     Benign neoplasm of adenomatous polyp     large intestine     Chronic constipation     Chronic heart failure with preserved ejection fraction (H) 02/29/2024    Chronic pain syndrome     Congestive heart failure (H)     COPD (chronic obstructive pulmonary disease) (H)     Oxygen at night     Dependence on supplemental oxygen     Oxygen at noc, during the day as needed    Depression     Diabetes mellitus (H)     Dry eye syndrome     Fibromyalgia     Ganglion     left wrist    GERD (gastroesophageal reflux disease)     Hyperlipidemia     Hypertension     Hypokalemia     Infective otitis externa, unspecified     Created by Conversion     Larynx edema     Lung disease     Malignant neoplasm of vulva (H)     Created by Conversion [employer] Annotation: Apr 17 2007  8:24AM - Cammy Bui:  resection per Dr. Alfonso Mane 9/06;  Replacement Utility updated for latest IMO load    Medial epicondylitis     Onychomycosis     Osteoarthritis     Peptic ulcer     Polyneuropathy     Vulvar malignant neoplasm (H)     Past Surgical History:   Procedure Laterality Date    BIOPSY BREAST Right     BIOPSY BREAST Right 01/28/2015    BIOPSY BREAST Right 01/28/2015    Procedure: RIGHT BREAST BIOPSY AFTER WIRE LOCALIZATION AT 0940;  Surgeon: Renée Soriano MD;  Location: [facility];  Service:     BIOPSY OF BREAST, INCISIONAL      Description: Incisional Breast Biopsy;  Recorded: 11/13/2007;  Comments: benign    COLONOSCOPY N/A 06/14/2019    Procedure: COLONOSCOPY;  Surgeon: Eduardo Mora MD;  Location: [facility];  Service:  Gastroenterology    CV CORONARY ANGIOGRAM N/A 02/08/2024    Procedure: CV CORONARY ANGIOGRAM;  Surgeon: Moises Valencia MD;  Location: [facility] CV    CV LEFT HEART CATH N/A 02/08/2024    Procedure: Left Heart Catheterization;  Surgeon: Moises Valencia MD;  Location: [facility] CV    CV RIGHT HEART CATH MEASUREMENTS RECORDED N/A 02/08/2024    Procedure: Right Heart Catheterization;  Surgeon: Moises Valencia MD;  Location: [facility] CV    CV TRANSCATHETER AORTIC VALVE REPLACEMENT-FEMORAL APPROACH N/A 02/20/2024    Procedure: Transcatheter Aortic Valve Replacement, possible cardiopulmonary bypass, possible surgical intervention;  Surgeon: Moises Valencia MD;  Location: [facility] CV    EP PACEMAKER DEVICE & IMPLANT- HIS LEAD DUAL N/A 3/7/2024    Procedure: Pacemaker Device & Lead Implant - HIS Lead Dual;  Surgeon: Donnell Leon MD;  Location: [facility] CV    ESOPHAGOSCOPY, GASTROSCOPY, DUODENOSCOPY (EGD), COMBINED N/A 11/06/2018    Procedure: ESOPHAGOGASTRODUODENOSCOPY;  Surgeon: Lit Fernando MD;  Location: [facility];  Service:     HYSTERECTOMY      JOINT REPLACEMENT Left     TKA    OR TRANSCATHETER AORTIC VALVE REPLACEMENT, FEMORAL PERCUTANEOUS APPROACH (STANDBY) N/A 02/20/2024    Procedure: OR TRANSCATHETER AORTIC VALVE REPLACEMENT, FEMORAL PERCUTANEOUS APPROACH (STANDBY);  Surgeon: Ishmael Farias MD;  Location: [facility] CV    PICC TRIPLE LUMEN PLACEMENT  01/12/2023         VT ABLATE HEART DYSRHYTHM FOCUS      Description: Catheter Ablation Atrial Fibrillation;  Recorded: 07/31/2012;  Comments: 7/24/12 PVI with Dr. Gardiner and nilay to all 5 pulm veins and CTI fl ablation line as well.    TRANSCATHETER AORTIC-VALVE REPLACEMENT      ZZC SUPRACERV ABD HYSTERECTOMY      Description: Supracervical Hysterectomy;  Proc Date: 01/01/1985;  Comments: some cervix left!; ovaries intact; done for bleeding    Family History   Problem  Relation Age of Onset    Heart Failure Mother     Cancer Other         paternal HX-laryngeal     Alcoholism Sister     No Known Problems Daughter     No Known Problems Maternal Grandmother     No Known Problems Maternal Grandfather     No Known Problems Paternal Grandmother     No Known Problems Paternal Grandfather     No Known Problems Maternal Aunt     No Known Problems Paternal Aunt     Alcoholism Sister     Alcoholism Brother     Alcoholism Father     Cancer Paternal Uncle         Gastric-Alcohol    Cancer Paternal Uncle         gastric-Alcohol    Hereditary Breast and Ovarian Cancer Syndrome No family hx of     Breast Cancer No family hx of     Colon Cancer No family hx of     Endometrial Cancer No family hx of     Ovarian Cancer No family hx of     Social History     Socioeconomic History    Marital status:      Spouse name: Not on file    Number of children: Not on file    Years of education: Not on file    Highest education level: Not on file   Occupational History    Not on file   Tobacco Use    Smoking status: Some Days     Current packs/day: 0.25     Types: Cigarettes     Passive exposure: Never    Smokeless tobacco: Never    Tobacco comments:     seen by TTS inpatient on 3/31/22   Vaping Use    Vaping status: Never Used   Substance and Sexual Activity    Alcohol use: Yes     Comment: Alcoholic Drinks/day: very little    Drug use: No    Sexual activity: Not on file   Other Topics Concern    Not on file   Social History Narrative    Not on file     Social Determinants of Health     Financial Resource Strain: Low Risk  (12/26/2023)    Financial Resource Strain     Within the past 12 months, have you or your family members you live with been unable to get utilities (heat, electricity) when it was really needed?: No   Food Insecurity: Low Risk  (12/26/2023)    Food Insecurity     Within the past 12 months, did you worry that your food would run out before you got money to buy more?: No     Within the  "past 12 months, did the food you bought just not last and you didn t have money to get more?: No   Transportation Needs: Low Risk  (12/26/2023)    Transportation Needs     Within the past 12 months, has lack of transportation kept you from medical appointments, getting your medicines, non-medical meetings or appointments, work, or from getting things that you need?: No   Physical Activity: Not on file   Stress: Not on file   Social Connections: Not on file   Interpersonal Safety: Low Risk  (4/1/2024)    Interpersonal Safety     Do you feel physically and emotionally safe where you currently live?: Yes     Within the past 12 months, have you been hit, slapped, kicked or otherwise physically hurt by someone?: No     Within the past 12 months, have you been humiliated or emotionally abused in other ways by your partner or ex-partner?: No   Housing Stability: Low Risk  (12/26/2023)    Housing Stability     Do you have housing? : Yes     Are you worried about losing your housing?: No          Medications  Allergies   Current Outpatient Medications   Medication Sig Dispense Refill    albuterol (PROAIR HFA/PROVENTIL HFA/VENTOLIN HFA) 108 (90 Base) MCG/ACT inhaler INHALE 2 PUFFS INTO THE LUNGS EVERY 4 HOURS AS NEEDED FOR WHEEZING 13.4 g 1    apixaban ANTICOAGULANT (ELIQUIS) 5 MG tablet Take 1 tablet (5 mg) by mouth 2 times daily 180 tablet 2    atorvastatin (LIPITOR) 20 MG tablet TAKE 1 TABLET(20 MG) BY MOUTH AT BEDTIME 90 tablet 3    BD ULTRA-FINE SHORT PEN NEEDLE 31 gauge x 5/16\" Ndle [BD ULTRA-FINE SHORT PEN NEEDLE 31 GAUGE X 5/16\" NDLE] TEST FOUR TIMES DAILY WITH MEALS AND AT BEDTIME 400 each 3    budesonide-formoterol (SYMBICORT) 160-4.5 MCG/ACT Inhaler INHALE 2 PUFFS INTO THE LUNGS TWICE DAILY 10.2 g 4    diltiazem ER COATED BEADS (CARDIZEM CD/CARTIA XT) 120 MG 24 hr capsule Take 120 mg by mouth daily      Emollient (EUCERIN ORIGINAL HEALING) LOTN APPLY LIBERALLY TO DRY SKIN TWICE DAILY AS NEEDED      furosemide " (LASIX) 20 MG tablet Take 2 tablets (40 mg) by mouth daily 180 tablet 1    gabapentin (NEURONTIN) 600 MG tablet TAKE 1 TABLET(600 MG) BY MOUTH THREE TIMES DAILY 270 tablet 2    generic lancets (FINGERSTIX LANCETS) [GENERIC LANCETS (FINGERSTIX LANCETS)] Dispense brand per patient's insurance at pharmacy discretion. 300 each 0    JARDIANCE 10 MG TABS tablet TAKE 1 TABLET(10 MG) BY MOUTH DAILY 90 tablet 2    magnesium oxide (MAG-OX) 400 MG tablet Take 1 tablet (400 mg) by mouth daily 30 tablet 0    metoprolol tartrate (LOPRESSOR) 25 MG tablet Take 1 tablet (25 mg) by mouth 2 times daily 60 tablet 3    neomycin-polymyxin-pramoxine (NEOSPORIN PLUS) 1 % external cream Apply topically to wound BID until healed. 28 g 0    nystatin (NYSTOP) 429172 UNIT/GM external powder APPLY TO THE AFFECTED AREA(S) 2-3 TIMES DAILY AS NEEDED 180 g 0    omeprazole (PRILOSEC) 20 MG DR capsule TAKE 1 CAPSULE(20 MG) BY MOUTH TWICE DAILY 180 capsule 1    oxyCODONE IR (ROXICODONE) 10 MG tablet Take 1 tablet (10 mg) by mouth every 6 hours as needed for moderate to severe pain 120 tablet 0    potassium chloride ER (KLOR-CON M) 20 MEQ CR tablet TAKE 1 TABLET(20 MEQ) BY MOUTH DAILY 90 tablet 3    sucralfate (CARAFATE) 1 GM tablet CRUSH ONE TABLET AND MIX WITH A LLITTLE WATER AND SWALLOW FOUR TIMES DAILY 360 tablet 3    ACCU-CHEK SOFTCLIX LANCETS lancets [ACCU-CHEK SOFTCLIX LANCETS LANCETS] TEST THREE TIMES DAILY (Patient not taking: Reported on 4/29/2024) 300 each 3    blood glucose (ONETOUCH VERIO IQ) test strip TEST THREE TIMES DAILY (Patient not taking: Reported on 4/29/2024) 300 strip 1    blood-glucose meter (ONETOUCH VERIO IQ METER) Misc [BLOOD-GLUCOSE METER (ONETOUCH VERIO IQ METER) MISC] Check blood sugar three times a day. (Patient not taking: Reported on 4/29/2024) 1 each 0    diaper,brief,adult,disposable (ADULT BRIEFS - LARGE) Misc [DIAPER,BRIEF,ADULT,DISPOSABLE (ADULT BRIEFS - LARGE) MISC] Use 3-4 daily as needed for incontinence  "(Patient not taking: Reported on 4/29/2024) 120 each 6    ipratropium - albuterol 0.5 mg/2.5 mg/3 mL (DUONEB) 0.5-2.5 (3) MG/3ML neb solution INHALE 1 VIAL VIA NEBULIZER EVERY 6 HOURS AS NEEDED FOR SHORTNESS OF BREATH OR WHEEZING (Patient not taking: Reported on 4/29/2024) 90 mL 0    meclizine (ANTIVERT) 25 MG tablet TAKE 1 TABLET(25 MG) BY MOUTH THREE TIMES DAILY AS NEEDED FOR DIZZINESS OR NAUSEA (Patient not taking: Reported on 4/29/2024) 45 tablet 5    mometasone-formoterol (DULERA) 200-5 MCG/ACT inhaler INHALE 2 PUFFS INTO THE LUNGS TWICE DAILY (Patient not taking: Reported on 4/29/2024) 39 g 3    naloxone (NARCAN) 4 MG/0.1ML nasal spray Spray 1 spray (4 mg) into one nostril alternating nostrils once as needed for opioid reversal every 2-3 minutes until assistance arrives (Patient not taking: Reported on 4/29/2024) 0.2 mL 0    Nutritional Supplements (ENSURE COMPLETE) LIQD Take 1 Can by mouth daily (Patient not taking: Reported on 4/29/2024) 7110 mL 11    Allergies   Allergen Reactions    Celebrex [Celecoxib] Rash     patient had butterfly rash - \"lupus-like\"      Latex Rash         Lab Results    Chemistry/lipid CBC Cardiac Enzymes/BNP/TSH/INR   Lab Results   Component Value Date    CHOL 167 09/20/2023    HDL 75 09/20/2023    TRIG 83 09/20/2023    BUN 20.4 04/24/2024     04/24/2024    CO2 30 (H) 04/24/2024    Lab Results   Component Value Date    WBC 7.0 04/24/2024    HGB 11.7 04/24/2024    HCT 39.3 04/24/2024    MCV 95 04/24/2024     04/24/2024    Lab Results   Component Value Date    TROPONINI 0.07 05/24/2023     (H) 06/02/2023    TSH 1.17 05/24/2023    INR 1.04 02/19/2024             This note has been dictated using voice recognition software. Any grammatical, typographical, or context distortions are unintentional and inherent to the software    30 minutes spent on the date of encounter doing chart review, review of outside records, review of test results, interpretation with above " tests, patient visit, documentation, and discussion with family.                    Thank you for allowing me to participate in the care of your patient.      Sincerely,     RAUDEL Fall CNP     [facility]  cc:   RAUDEL Fall CNP  [address], [address]  [place] [address]

## 2024-04-30 ENCOUNTER — OFFICE VISIT (OUTPATIENT)
Dept: FAMILY MEDICINE | Facility: CLINIC | Age: 74
End: 2024-04-30
Payer: COMMERCIAL

## 2024-04-30 VITALS
TEMPERATURE: 97.8 F | DIASTOLIC BLOOD PRESSURE: 48 MMHG | OXYGEN SATURATION: 84 % | RESPIRATION RATE: 16 BRPM | SYSTOLIC BLOOD PRESSURE: 101 MMHG | HEART RATE: 70 BPM

## 2024-04-30 DIAGNOSIS — E11.42 DIABETIC POLYNEUROPATHY ASSOCIATED WITH TYPE 2 DIABETES MELLITUS (H): ICD-10-CM

## 2024-04-30 DIAGNOSIS — J43.9 PULMONARY EMPHYSEMA, UNSPECIFIED EMPHYSEMA TYPE (H): ICD-10-CM

## 2024-04-30 DIAGNOSIS — G89.4 CHRONIC PAIN SYNDROME: Primary | ICD-10-CM

## 2024-04-30 DIAGNOSIS — M79.7 FIBROMYALGIA: ICD-10-CM

## 2024-04-30 DIAGNOSIS — H61.22 IMPACTED CERUMEN OF LEFT EAR: ICD-10-CM

## 2024-04-30 DIAGNOSIS — Z79.899 MEDICATION MANAGEMENT: ICD-10-CM

## 2024-04-30 PROCEDURE — 99213 OFFICE O/P EST LOW 20 MIN: CPT | Mod: 24 | Performed by: FAMILY MEDICINE

## 2024-04-30 PROCEDURE — 20553 NJX 1/MLT TRIGGER POINTS 3/>: CPT | Performed by: FAMILY MEDICINE

## 2024-04-30 PROCEDURE — 69210 REMOVE IMPACTED EAR WAX UNI: CPT | Mod: 59 | Performed by: FAMILY MEDICINE

## 2024-04-30 RX ORDER — ACETAMINOPHEN 160 MG
1 TABLET,DISINTEGRATING ORAL SEE ADMIN INSTRUCTIONS
Status: DISCONTINUED | OUTPATIENT
Start: 2024-05-07 | End: 2024-07-01

## 2024-04-30 RX ORDER — OXYCODONE HYDROCHLORIDE 10 MG/1
10 TABLET ORAL EVERY 6 HOURS PRN
Qty: 120 TABLET | Refills: 0 | Status: SHIPPED | OUTPATIENT
Start: 2024-04-30 | End: 2024-05-28

## 2024-04-30 RX ORDER — RESPIRATORY SYNCYTIAL VIRUS VACCINE 120MCG/0.5
0.5 KIT INTRAMUSCULAR ONCE
Qty: 1 EACH | Refills: 0 | Status: CANCELLED | OUTPATIENT
Start: 2024-04-30 | End: 2024-04-30

## 2024-04-30 RX ADMIN — Medication 1 ML: at 08:05

## 2024-04-30 ASSESSMENT — ENCOUNTER SYMPTOMS
HEMATURIA: 0
COUGH: 0
BRUISES/BLEEDS EASILY: 0
SHORTNESS OF BREATH: 0
VOMITING: 0
FATIGUE: 1
FEVER: 0
BACK PAIN: 1
SORE THROAT: 0
ARTHRALGIAS: 1
PALPITATIONS: 0
MYALGIAS: 1
SEIZURES: 0
DYSURIA: 0
COLOR CHANGE: 0
ABDOMINAL PAIN: 0
CHILLS: 0
EYE PAIN: 0

## 2024-04-30 NOTE — PROGRESS NOTES
"  1. Chronic pain syndrome  2. Fibromyalgia  This is a 75 yo female with chronic pain related to fibromyalgia, arthritis, back pain.  Unable to take NSAIDs related to previous GI bleeds.  Has benefited from trigger point injections.  Here today for this procedure.  Tolerated well.  Also have refilled her Oxycodone.    - oxyCODONE IR (ROXICODONE) 10 MG tablet; Take 1 tablet (10 mg) by mouth every 6 hours as needed for moderate to severe pain  Dispense: 120 tablet; Refill: 0    Procedures  Procedure Note - Trigger Point Injections    Consent obtained: verbal    Indication:  chronic pain/fibromyalgia    6 trigger points identified.  Each trigger point swabbed x 3 with Betadine swab.  Each trigger point then injected with 1.6 ml of 1% Lidocaine (no epi).    Total volume injected:  10 ml    Complications:  None, patient tolerated procedure well.    Plan:  Discussed care with patient.  RTC for further injections in 30 days prn.        3. Impacted cerumen of left ear  Patient feels \"funny feeling\" left ear - has dry flaky wax deep in ear canal on left.  Unable to remove with loop.  Staff performed ear lavage with warm water/peroxide.  Doing well.    - REMOVE IMPACTED EAR WAX    4. Pulmonary emphysema, unspecified emphysema type (H)  H/o COPD - stable -     5. Diabetic polyneuropathy associated with type 2 diabetes mellitus (H)  Type II DM - needs follow up labs - tells me she hasn't been very attentive to her sugars recently     6. Medication management  Reviewed meds today.        Carol Muñiz is a 74 year old, presenting for the following health issues:  Imm/Inj (Trigger point shot)      4/30/2024     5:09 PM   Additional Questions   Roomed by Monroe Farrar filled oxycodone 4/2/2024 per       History of Present Illness       Reason for visit:  Trigger point shot    She eats 0-1 servings of fruits and vegetables daily.She consumes 1 sweetened beverage(s) daily.   She is taking medications regularly.   "     Review of Systems   Constitutional:  Positive for fatigue. Negative for chills and fever.   HENT:  Negative for ear pain and sore throat.    Eyes:  Negative for pain and visual disturbance.   Respiratory:  Negative for cough and shortness of breath.    Cardiovascular:  Negative for chest pain and palpitations.   Gastrointestinal:  Negative for abdominal pain and vomiting.   Genitourinary:  Negative for dysuria and hematuria.   Musculoskeletal:  Positive for arthralgias, back pain and myalgias.        Chronic pain syndrome     Skin:  Negative for color change and rash.   Neurological:  Negative for seizures and syncope.   Hematological:  Does not bruise/bleed easily.   All other systems reviewed and are negative.              Objective    /48 (BP Location: Right arm, Patient Position: Sitting, Cuff Size: Adult Regular)   Pulse 70   Temp 97.8  F (36.6  C) (Temporal)   Resp 16   LMP  (LMP Unknown)   SpO2 (!) 84%   There is no height or weight on file to calculate BMI.  Physical Exam  Vitals reviewed.   Constitutional:       General: She is not in acute distress.     Appearance: Normal appearance.   HENT:      Head: Normocephalic.      Right Ear: Tympanic membrane, ear canal and external ear normal.      Left Ear: Tympanic membrane, ear canal and external ear normal.      Nose: Nose normal.      Mouth/Throat:      Mouth: Mucous membranes are moist.      Pharynx: No posterior oropharyngeal erythema.   Eyes:      Extraocular Movements: Extraocular movements intact.      Conjunctiva/sclera: Conjunctivae normal.      Pupils: Pupils are equal, round, and reactive to light.   Cardiovascular:      Rate and Rhythm: Normal rate and regular rhythm.      Pulses: Normal pulses.      Heart sounds: Normal heart sounds. No murmur heard.     Comments: + valve sound  Pacemaker in place    Pulmonary:      Effort: Pulmonary effort is normal.      Breath sounds: Normal breath sounds.   Abdominal:      Palpations: Abdomen is  soft. There is no mass.      Tenderness: There is no abdominal tenderness. There is no guarding or rebound.   Musculoskeletal:         General: No deformity. Normal range of motion.      Cervical back: Normal range of motion and neck supple.      Comments: Multiple trigger points - paracervical , suprascapular,  shoulders     Lymphadenopathy:      Cervical: No cervical adenopathy.   Skin:     General: Skin is warm and dry.   Neurological:      General: No focal deficit present.      Mental Status: She is alert.   Psychiatric:         Mood and Affect: Mood normal.         Behavior: Behavior normal.            No results found for any visits on 04/30/24.      Prior to immunization administration, verified patients identity using patient s name and date of birth. Please see Immunization Activity for additional information.     Screening Questionnaire for Adult Immunization    Are you sick today?   No   Do you have allergies to medications, food, a vaccine component or latex?   Yes   Have you ever had a serious reaction after receiving a vaccination?   No   Do you have a long-term health problem with heart, lung, kidney, or metabolic disease (e.g., diabetes), asthma, a blood disorder, no spleen, complement component deficiency, a cochlear implant, or a spinal fluid leak?  Are you on long-term aspirin therapy?   Yes   Do you have cancer, leukemia, HIV/AIDS, or any other immune system problem?   No   Do you have a parent, brother, or sister with an immune system problem?   No   In the past 3 months, have you taken medications that affect  your immune system, such as prednisone, other steroids, or anticancer drugs; drugs for the treatment of rheumatoid arthritis, Crohn s disease, or psoriasis; or have you had radiation treatments?   Yes   Have you had a seizure, or a brain or other nervous system problem?   No   During the past year, have you received a transfusion of blood or blood    products, or been given immune  (gamma) globulin or antiviral drug?   Yes   For women: Are you pregnant or is there a chance you could become       pregnant during the next month?   No   Have you received any vaccinations in the past 4 weeks?   No     Immunization questionnaire was positive for at least one answer.  Notified Dr Brizuela.      Patient instructed to remain in clinic for 15 minutes afterwards, and to report any adverse reactions.     Screening performed by Monroe Zamudio on 4/30/2024 at 5:14 PM.   Signed Electronically by: MD SAVANA QUINTERO MD

## 2024-05-01 NOTE — PROGRESS NOTES
Patient identified using two patient identifiers.  Ear exam showing wax occlusion completed by provider.  H202/H20 was placed in the left ear(s) via irrigation tool: elephant ear.   Answers submitted by the patient for this visit:  General Questionnaire (Submitted on 4/30/2024)  Chief Complaint: Chronic problems general questions HPI Form  What is the reason for your visit today? : trigger point shot  How many servings of fruits and vegetables do you eat daily?: 0-1  On average, how many sweetened beverages do you drink each day (Examples: soda, juice, sweet tea, etc.  Do NOT count diet or artificially sweetened beverages)?: 1  How many days per week do you miss taking your medication?: 0

## 2024-05-08 ENCOUNTER — PATIENT OUTREACH (OUTPATIENT)
Dept: GERIATRIC MEDICINE | Facility: CLINIC | Age: 74
End: 2024-05-08
Payer: COMMERCIAL

## 2024-05-08 NOTE — PROGRESS NOTES
[facility] Care Coordination Contact    Member changed health plan products from [employer] to [employer] effective 05/01/2024. CC to complete items on Product Change/ Health Plan Change check list including MMIS entry. CMS mailed Welcome Letter, supporting documents, [place] and other required tasks.    Esha Gerardo  Care Management Specialist  [facility]  [phone]

## 2024-05-08 NOTE — LETTER
May 8, 2024    MARY KAY HINDS  [address] [place] [id number]      Dear Mary Kay,    Welcome to [facility] health program. My name is Samantha Alexandra RN, PHN. I am your [facility] care coordinator. You're eligible for care coordination through your [facility] Product.    As your care coordinator, we ll:  Meet to go over your care coordination benefits  Talk about your physical and mental health care needs   Review your preventative care needs  Create a plan that meets your needs with the services you choose    What happens next?  I ll call you soon to introduce myself and tell you more about my role. We ll then plan time to go over your health and safety needs. Our goal is to keep you as healthy and independent as possible.    [facility] combines the benefits you may already have receive from Medical Assistance, Medicare and the Prescription Drug Coverage Program. Soon you'll receive a new member identification (ID) card from [facility]. Use this card whenever you get health services.    The [facility] care coordination program is voluntary and offered to you at no cost. If you wish to stop being in the care coordination program or have questions, call me at [phone]. If you reach my voicemail, leave a message and your phone number. TTY users, call the [place] [employer] at 492 or [phone] (xeroip-sh-lklgii relay service).    Sincerely,    Samantha Alexandra RN, PHN  [phone]  [email or web address] ([facility]) is a health plan that contracts with both Medicare and the [place] Medical Assistance (Medicaid) program to provide benefits of both programs to enrollees. Enrollment in [facility] depends on contract renewal.    [id number] accepted   [id number]         [id number] (07/2022)

## 2024-05-15 ENCOUNTER — PATIENT OUTREACH (OUTPATIENT)
Dept: GERIATRIC MEDICINE | Facility: CLINIC | Age: 74
End: 2024-05-15
Payer: COMMERCIAL

## 2024-05-22 ENCOUNTER — OFFICE VISIT (OUTPATIENT)
Dept: CARDIOLOGY | Facility: CLINIC | Age: 74
End: 2024-05-22
Attending: NURSE PRACTITIONER
Payer: COMMERCIAL

## 2024-05-22 VITALS
DIASTOLIC BLOOD PRESSURE: 52 MMHG | WEIGHT: 146 LBS | HEART RATE: 64 BPM | RESPIRATION RATE: 16 BRPM | BODY MASS INDEX: 23.57 KG/M2 | SYSTOLIC BLOOD PRESSURE: 96 MMHG

## 2024-05-22 DIAGNOSIS — I35.0 AORTIC STENOSIS, SEVERE: ICD-10-CM

## 2024-05-22 DIAGNOSIS — I50.32 CHRONIC HEART FAILURE WITH PRESERVED EJECTION FRACTION (H): Primary | ICD-10-CM

## 2024-05-22 DIAGNOSIS — I48.0 PAROXYSMAL ATRIAL FIBRILLATION (H): ICD-10-CM

## 2024-05-22 DIAGNOSIS — I10 ESSENTIAL HYPERTENSION: ICD-10-CM

## 2024-05-22 DIAGNOSIS — R00.1 SINUS BRADYCARDIA: ICD-10-CM

## 2024-05-22 LAB
ANION GAP SERPL CALCULATED.3IONS-SCNC: 7 MMOL/L (ref 7–15)
BUN SERPL-MCNC: 15.5 MG/DL (ref 8–23)
CALCIUM SERPL-MCNC: 9.4 MG/DL (ref 8.8–10.2)
CHLORIDE SERPL-SCNC: 101 MMOL/L (ref 98–107)
CREAT SERPL-MCNC: 0.77 MG/DL (ref 0.51–0.95)
DEPRECATED HCO3 PLAS-SCNC: 35 MMOL/L (ref 22–29)
EGFRCR SERPLBLD CKD-EPI 2021: 80 ML/MIN/1.73M2
GLUCOSE SERPL-MCNC: 95 MG/DL (ref 70–99)
POTASSIUM SERPL-SCNC: 4.2 MMOL/L (ref 3.4–5.3)
SODIUM SERPL-SCNC: 143 MMOL/L (ref 135–145)

## 2024-05-22 PROCEDURE — 36415 COLL VENOUS BLD VENIPUNCTURE: CPT | Performed by: NURSE PRACTITIONER

## 2024-05-22 PROCEDURE — 99214 OFFICE O/P EST MOD 30 MIN: CPT | Performed by: NURSE PRACTITIONER

## 2024-05-22 PROCEDURE — 80048 BASIC METABOLIC PNL TOTAL CA: CPT | Performed by: NURSE PRACTITIONER

## 2024-05-22 NOTE — PATIENT INSTRUCTIONS
Mary Kay Tejada,    It was a pleasure to see you today at [facility].     My recommendations after this visit include:  - Please follow up with Dr Pizano June 7   - I have checked labs  - Continue current medications    Alexandria Lynn, CNP

## 2024-05-22 NOTE — LETTER
5/22/2024    SAVANA SILVER MD  [address] [facility] [address]    RE: Mary Kay Tejada       Dear Colleague,     I had the pleasure of seeing Mary Kay Tejada in the [facility].        Assessment/Recommendations   Assessment:    1.  Heart failure with preserved ejection fraction, NYHA class III: She has chronic dyspnea on exertion.  She has trace edema.  She does not always monitor her weight at home or follow a low-sodium diet.  We discussed this in detail again today.  2.  Hypertension: Blood pressure 96/52  3.  Severe aortic stenosis: Status post TAVR February 2024. Recent echocardiogram showed mean gradient 8 mmHg   4.  Paroxysmal atrial fibrillation: Last device check showed no atrial fibrillation.  She continues Eliquis for anticoagulation.  Next remote device check is scheduled August 1  5.  Sinus node dysfunction: She wore a MCOT which showed a 4-second pause.  Permanent pacemaker implanted March 7, 2024     Plan:  1.   BMP pending  2.  Continue current medications  3.  Stressed importance of monitoring daily weights and following a low-sodium diet    Mary Kay Tejada will follow up with Dr. Pizano June 7.     History of Present Illness/Subjective    Ms. Mary Kay Tejada is a 74 year old female seen at [facility] heart failure clinic today for continued follow-up.  Her son accompanies her today.  She follows up for heart failure with preserved ejection fraction. She underwent TAVR on February 20, 2024. Recent echocardiogram April 17 showed ejection fraction of 55 to 60% with mean gradient of aortic valve 8 mmHg. Postprocedure she was noted to have QRS widening and first-degree AV block. It was recommended she discharge with MCOT. She had a 4-second pause and underwent permanent pacemaker on March 7. 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 Status post TAVR February 20, 2024. Status post permanent pacemaker March 7, 2024     During the last clinic visit, I increased her Lasix to 40 mg daily.  Today, she continues to have chronic dyspnea on exertion and trace edema.  She denies lightheadedness, orthopnea, PND, palpitations, chest pain, and abdominal fullness/bloating.      She does not always follow a low-sodium diet.  She does not always monitor her weight on a daily basis.  Her most recent weight at home was 144 pounds.     Physical Examination Review of Systems   BP 96/52 (BP Location: Right arm, Patient Position: Sitting, Cuff Size: Adult Regular)   Pulse 64   Resp 16   Wt 66.2 kg (146 lb)   LMP  (LMP Unknown)   BMI 23.57 kg/m    Body mass index is 23.57 kg/m .  Wt Readings from Last 3 Encounters:   05/22/24 66.2 kg (146 lb)   04/29/24 65.8 kg (145 lb)   04/24/24 66.7 kg (147 lb)       General Appearance:   no acute distress   ENT/Mouth: No abnormalities   EYES:  no scleral icterus, normal conjunctivae   Neck: no thyromegaly   Chest/Lungs:   lungs are decreased to auscultation, no rales or wheezing, equal chest wall expansion    Cardiovascular:   Regular. Normal first and second heart sounds with no murmurs, rubs, or gallops, trace edema bilaterally    Abdomen:  bowel sounds are present   Extremities: no cyanosis or clubbing   Skin: warm   Neurologic: no tremors     Psychiatric: alert and oriented x3                                              Medical History  Surgical History Family History Social History   Past Medical History:   Diagnosis Date    Anemia     Aortic stenosis     Aortic valve disorder     Atrial fibrillation (H)     Atrial flutter (H)     Benign neoplasm of adenomatous polyp     large intestine     Chronic constipation     Chronic heart failure with preserved ejection fraction (H) 02/29/2024    Chronic pain syndrome     Congestive heart failure (H)     COPD  (chronic obstructive pulmonary disease) (H)     Oxygen at night     Dependence on supplemental oxygen     Oxygen at noc, during the day as needed    Depression     Diabetes mellitus (H)     Dry eye syndrome     Fibromyalgia     Ganglion     left wrist    GERD (gastroesophageal reflux disease)     Hyperlipidemia     Hypertension     Hypokalemia     Infective otitis externa, unspecified     Created by Conversion     Larynx edema     Lung disease     Malignant neoplasm of vulva (H)     Created by Conversion [facility] Annotation: Apr 17 2007  8:24AM - Cammy Bui:  resection per Dr. Alfonso Mane 9/06;  Replacement Utility updated for latest IMO load    Medial epicondylitis     Onychomycosis     Osteoarthritis     Peptic ulcer     Polyneuropathy     Vulvar malignant neoplasm (H)     Past Surgical History:   Procedure Laterality Date    BIOPSY BREAST Right     BIOPSY BREAST Right 01/28/2015    BIOPSY BREAST Right 01/28/2015    Procedure: RIGHT BREAST BIOPSY AFTER WIRE LOCALIZATION AT 0940;  Surgeon: Renée Soriano MD;  Location: [facility];  Service:     BIOPSY OF BREAST, INCISIONAL      Description: Incisional Breast Biopsy;  Recorded: 11/13/2007;  Comments: benign    COLONOSCOPY N/A 06/14/2019    Procedure: COLONOSCOPY;  Surgeon: Eduardo Mora MD;  Location: [facility];  Service: Gastroenterology    CV CORONARY ANGIOGRAM N/A 02/08/2024    Procedure: CV CORONARY ANGIOGRAM;  Surgeon: Moises Valencia MD;  Location: [facility] LAB CV    CV LEFT HEART CATH N/A 02/08/2024    Procedure: Left Heart Catheterization;  Surgeon: Moises Valencia MD;  Location: [facility] LAB CV    CV RIGHT HEART CATH MEASUREMENTS RECORDED N/A 02/08/2024    Procedure: Right Heart Catheterization;  Surgeon: Moises Valencia MD;  Location: [facility] CATH LAB CV    CV TRANSCATHETER AORTIC VALVE REPLACEMENT-FEMORAL APPROACH N/A 02/20/2024    Procedure: Transcatheter Aortic Valve Replacement, possible  cardiopulmonary bypass, possible surgical intervention;  Surgeon: Moises Valencia MD;  Location: [facility] CV    EP PACEMAKER DEVICE & IMPLANT- HIS LEAD DUAL N/A 3/7/2024    Procedure: Pacemaker Device & Lead Implant - HIS Lead Dual;  Surgeon: Donnell Leon MD;  Location: [facility] CV    ESOPHAGOSCOPY, GASTROSCOPY, DUODENOSCOPY (EGD), COMBINED N/A 11/06/2018    Procedure: ESOPHAGOGASTRODUODENOSCOPY;  Surgeon: Lit Fernando MD;  Location: [facility];  Service:     HYSTERECTOMY      JOINT REPLACEMENT Left     TKA    OR TRANSCATHETER AORTIC VALVE REPLACEMENT, FEMORAL PERCUTANEOUS APPROACH (STANDBY) N/A 02/20/2024    Procedure: OR TRANSCATHETER AORTIC VALVE REPLACEMENT, FEMORAL PERCUTANEOUS APPROACH (STANDBY);  Surgeon: Ishmael Farias MD;  Location: [facility] CV    PICC TRIPLE LUMEN PLACEMENT  01/12/2023         RI ABLATE HEART DYSRHYTHM FOCUS      Description: Catheter Ablation Atrial Fibrillation;  Recorded: 07/31/2012;  Comments: 7/24/12 PVI with Dr. Gardiner and nilay to all 5 pulm veins and CTI fl ablation line as well.    TRANSCATHETER AORTIC-VALVE REPLACEMENT      ZZC SUPRACERV ABD HYSTERECTOMY      Description: Supracervical Hysterectomy;  Proc Date: 01/01/1985;  Comments: some cervix left!; ovaries intact; done for bleeding    Family History   Problem Relation Age of Onset    Heart Failure Mother     Cancer Other         paternal HX-laryngeal     Alcoholism Sister     No Known Problems Daughter     No Known Problems Maternal Grandmother     No Known Problems Maternal Grandfather     No Known Problems Paternal Grandmother     No Known Problems Paternal Grandfather     No Known Problems Maternal Aunt     No Known Problems Paternal Aunt     Alcoholism Sister     Alcoholism Brother     Alcoholism Father     Cancer Paternal Uncle         Gastric-Alcohol    Cancer Paternal Uncle         gastric-Alcohol    Hereditary Breast and Ovarian Cancer Syndrome No family  hx of     Breast Cancer No family hx of     Colon Cancer No family hx of     Endometrial Cancer No family hx of     Ovarian Cancer No family hx of     Social History     Socioeconomic History    Marital status:      Spouse name: Not on file    Number of children: Not on file    Years of education: Not on file    Highest education level: Not on file   Occupational History    Not on file   Tobacco Use    Smoking status: Some Days     Current packs/day: 0.25     Types: Cigarettes     Passive exposure: Never    Smokeless tobacco: Never    Tobacco comments:     seen by TTS inpatient on 3/31/22   Vaping Use    Vaping status: Never Used   Substance and Sexual Activity    Alcohol use: Yes     Comment: Alcoholic Drinks/day: very little    Drug use: No    Sexual activity: Not on file   Other Topics Concern    Not on file   Social History Narrative    Not on file     Social Determinants of Health     Financial Resource Strain: Low Risk  (12/26/2023)    Financial Resource Strain     Within the past 12 months, have you or your family members you live with been unable to get utilities (heat, electricity) when it was really needed?: No   Food Insecurity: Low Risk  (12/26/2023)    Food Insecurity     Within the past 12 months, did you worry that your food would run out before you got money to buy more?: No     Within the past 12 months, did the food you bought just not last and you didn t have money to get more?: No   Transportation Needs: Low Risk  (12/26/2023)    Transportation Needs     Within the past 12 months, has lack of transportation kept you from medical appointments, getting your medicines, non-medical meetings or appointments, work, or from getting things that you need?: No   Physical Activity: Not on file   Stress: Not on file   Social Connections: Not on file   Interpersonal Safety: Low Risk  (4/1/2024)    Interpersonal Safety     Do you feel physically and emotionally safe where you currently live?: Yes      "Within the past 12 months, have you been hit, slapped, kicked or otherwise physically hurt by someone?: No     Within the past 12 months, have you been humiliated or emotionally abused in other ways by your partner or ex-partner?: No   Housing Stability: Low Risk  (12/26/2023)    Housing Stability     Do you have housing? : Yes     Are you worried about losing your housing?: No          Medications  Allergies   Current Outpatient Medications   Medication Sig Dispense Refill    ACCU-CHEK SOFTCLIX LANCETS lancets [ACCU-CHEK SOFTCLIX LANCETS LANCETS] TEST THREE TIMES DAILY 300 each 3    albuterol (PROAIR HFA/PROVENTIL HFA/VENTOLIN HFA) 108 (90 Base) MCG/ACT inhaler INHALE 2 PUFFS INTO THE LUNGS EVERY 4 HOURS AS NEEDED FOR WHEEZING 13.4 g 1    apixaban ANTICOAGULANT (ELIQUIS) 5 MG tablet Take 1 tablet (5 mg) by mouth 2 times daily 180 tablet 2    atorvastatin (LIPITOR) 20 MG tablet TAKE 1 TABLET(20 MG) BY MOUTH AT BEDTIME 90 tablet 3    BD ULTRA-FINE SHORT PEN NEEDLE 31 gauge x 5/16\" Ndle [BD ULTRA-FINE SHORT PEN NEEDLE 31 GAUGE X 5/16\" NDLE] TEST FOUR TIMES DAILY WITH MEALS AND AT BEDTIME 400 each 3    blood glucose (ONETOUCH VERIO IQ) test strip TEST THREE TIMES DAILY 300 strip 1    blood-glucose meter (ONETOUCH VERIO IQ METER) Misc [BLOOD-GLUCOSE METER (ONETOUCH VERIO IQ METER) MISC] Check blood sugar three times a day. 1 each 0    budesonide-formoterol (SYMBICORT) 160-4.5 MCG/ACT Inhaler INHALE 2 PUFFS INTO THE LUNGS TWICE DAILY 10.2 g 4    diaper,brief,adult,disposable (ADULT BRIEFS - LARGE) Misc [DIAPER,BRIEF,ADULT,DISPOSABLE (ADULT BRIEFS - LARGE) MISC] Use 3-4 daily as needed for incontinence 120 each 6    diltiazem ER COATED BEADS (CARDIZEM CD/CARTIA XT) 120 MG 24 hr capsule Take 120 mg by mouth daily      Emollient (EUCERIN ORIGINAL HEALING) LOTN APPLY LIBERALLY TO DRY SKIN TWICE DAILY AS NEEDED      furosemide (LASIX) 20 MG tablet Take 2 tablets (40 mg) by mouth daily 180 tablet 1    gabapentin (NEURONTIN) " 600 MG tablet TAKE 1 TABLET(600 MG) BY MOUTH THREE TIMES DAILY 270 tablet 2    generic lancets (FINGERSTIX LANCETS) [GENERIC LANCETS (FINGERSTIX LANCETS)] Dispense brand per patient's insurance at pharmacy discretion. 300 each 0    ipratropium - albuterol 0.5 mg/2.5 mg/3 mL (DUONEB) 0.5-2.5 (3) MG/3ML neb solution INHALE 1 VIAL VIA NEBULIZER EVERY 6 HOURS AS NEEDED FOR SHORTNESS OF BREATH OR WHEEZING 90 mL 0    JARDIANCE 10 MG TABS tablet TAKE 1 TABLET(10 MG) BY MOUTH DAILY 90 tablet 2    magnesium oxide (MAG-OX) 400 MG tablet Take 1 tablet (400 mg) by mouth daily 30 tablet 0    metoprolol tartrate (LOPRESSOR) 25 MG tablet Take 1 tablet (25 mg) by mouth 2 times daily 60 tablet 3    mometasone-formoterol (DULERA) 200-5 MCG/ACT inhaler INHALE 2 PUFFS INTO THE LUNGS TWICE DAILY 39 g 3    neomycin-polymyxin-pramoxine (NEOSPORIN PLUS) 1 % external cream Apply topically to wound BID until healed. 28 g 0    Nutritional Supplements (ENSURE COMPLETE) LIQD Take 1 Can by mouth daily 7110 mL 11    nystatin (NYSTOP) 325305 UNIT/GM external powder APPLY TO THE AFFECTED AREA(S) 2-3 TIMES DAILY AS NEEDED 180 g 0    omeprazole (PRILOSEC) 20 MG DR capsule TAKE 1 CAPSULE(20 MG) BY MOUTH TWICE DAILY 180 capsule 1    oxyCODONE IR (ROXICODONE) 10 MG tablet Take 1 tablet (10 mg) by mouth every 6 hours as needed for moderate to severe pain 120 tablet 0    potassium chloride ER (KLOR-CON M) 20 MEQ CR tablet TAKE 1 TABLET(20 MEQ) BY MOUTH DAILY 90 tablet 3    sucralfate (CARAFATE) 1 GM tablet CRUSH ONE TABLET AND MIX WITH A LLITTLE WATER AND SWALLOW FOUR TIMES DAILY 360 tablet 3    meclizine (ANTIVERT) 25 MG tablet TAKE 1 TABLET(25 MG) BY MOUTH THREE TIMES DAILY AS NEEDED FOR DIZZINESS OR NAUSEA (Patient not taking: Reported on 5/22/2024) 45 tablet 5    naloxone (NARCAN) 4 MG/0.1ML nasal spray Spray 1 spray (4 mg) into one nostril alternating nostrils once as needed for opioid reversal every 2-3 minutes until assistance arrives 0.2 mL 0     "Allergies   Allergen Reactions    Celebrex [Celecoxib] Rash     patient had butterfly rash - \"lupus-like\"      Latex Rash         Lab Results    Chemistry/lipid CBC Cardiac Enzymes/BNP/TSH/INR   Lab Results   Component Value Date    CHOL 167 09/20/2023    HDL 75 09/20/2023    TRIG 83 09/20/2023    BUN 15.5 05/22/2024     05/22/2024    CO2 35 (H) 05/22/2024    Lab Results   Component Value Date    WBC 7.0 04/24/2024    HGB 11.7 04/24/2024    HCT 39.3 04/24/2024    MCV 95 04/24/2024     04/24/2024    Lab Results   Component Value Date    TROPONINI 0.07 05/24/2023     (H) 06/02/2023    TSH 1.17 05/24/2023    INR 1.04 02/19/2024             This note has been dictated using voice recognition software. Any grammatical, typographical, or context distortions are unintentional and inherent to the software    30 minutes spent on the date of encounter doing chart review, review of outside records, review of test results, interpretation with above tests, patient visit, documentation, and discussion with family.                      Thank you for allowing me to participate in the care of your patient.      Sincerely,     RAUDEL Fall CNP     [facility]  cc:   RAUDEL Fall CNP  [address], [address]  [place] [address]      "

## 2024-05-22 NOTE — PROGRESS NOTES
Assessment/Recommendations   Assessment:    1.  Heart failure with preserved ejection fraction, NYHA class III: She has chronic dyspnea on exertion.  She has trace edema.  She does not always monitor her weight at home or follow a low-sodium diet.  We discussed this in detail again today.  2.  Hypertension: Blood pressure 96/52  3.  Severe aortic stenosis: Status post TAVR February 2024. Recent echocardiogram showed mean gradient 8 mmHg   4.  Paroxysmal atrial fibrillation: Last device check showed no atrial fibrillation.  She continues Eliquis for anticoagulation.  Next remote device check is scheduled August 1  5.  Sinus node dysfunction: She wore a MCOT which showed a 4-second pause.  Permanent pacemaker implanted March 7, 2024     Plan:  1.   BMP pending  2.  Continue current medications  3.  Stressed importance of monitoring daily weights and following a low-sodium diet    Mary Kay Tejdaa will follow up with Dr. Pizano June 7.     History of Present Illness/Subjective    Ms. Mary Kay Tejada is a 74 year old female seen at [facility] heart failure clinic today for continued follow-up.  Her son accompanies her today.  She follows up for heart failure with preserved ejection fraction. She underwent TAVR on February 20, 2024. Recent echocardiogram April 17 showed ejection fraction of 55 to 60% with mean gradient of aortic valve 8 mmHg. Postprocedure she was noted to have QRS widening and first-degree AV block. It was recommended she discharge with MCOT. She had a 4-second pause and underwent permanent pacemaker on March 7. 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 Status post TAVR February 20, 2024. Status post permanent pacemaker March 7, 2024     During the last clinic visit, I increased her Lasix to 40 mg daily.   Today, she continues to have chronic dyspnea on exertion and trace edema.  She denies lightheadedness, orthopnea, PND, palpitations, chest pain, and abdominal fullness/bloating.      She does not always follow a low-sodium diet.  She does not always monitor her weight on a daily basis.  Her most recent weight at home was 144 pounds.     Physical Examination Review of Systems   BP 96/52 (BP Location: Right arm, Patient Position: Sitting, Cuff Size: Adult Regular)   Pulse 64   Resp 16   Wt 66.2 kg (146 lb)   LMP  (LMP Unknown)   BMI 23.57 kg/m    Body mass index is 23.57 kg/m .  Wt Readings from Last 3 Encounters:   05/22/24 66.2 kg (146 lb)   04/29/24 65.8 kg (145 lb)   04/24/24 66.7 kg (147 lb)       General Appearance:   no acute distress   ENT/Mouth: No abnormalities   EYES:  no scleral icterus, normal conjunctivae   Neck: no thyromegaly   Chest/Lungs:   lungs are decreased to auscultation, no rales or wheezing, equal chest wall expansion    Cardiovascular:   Regular. Normal first and second heart sounds with no murmurs, rubs, or gallops, trace edema bilaterally    Abdomen:  bowel sounds are present   Extremities: no cyanosis or clubbing   Skin: warm   Neurologic: no tremors     Psychiatric: alert and oriented x3                                              Medical History  Surgical History Family History Social History   Past Medical History:   Diagnosis Date    Anemia     Aortic stenosis     Aortic valve disorder     Atrial fibrillation (H)     Atrial flutter (H)     Benign neoplasm of adenomatous polyp     large intestine     Chronic constipation     Chronic heart failure with preserved ejection fraction (H) 02/29/2024    Chronic pain syndrome     Congestive heart failure (H)     COPD (chronic obstructive pulmonary disease) (H)     Oxygen at night     Dependence on supplemental oxygen     Oxygen at noc, during the day as needed    Depression     Diabetes mellitus (H)     Dry eye syndrome     Fibromyalgia      Ganglion     left wrist    GERD (gastroesophageal reflux disease)     Hyperlipidemia     Hypertension     Hypokalemia     Infective otitis externa, unspecified     Created by Conversion     Larynx edema     Lung disease     Malignant neoplasm of vulva (H)     Created by Conversion [facility] Annotation: Apr 17 2007  8:24AM - Cammy Bui:  resection per Dr. Alfonso Mane 9/06;  Replacement Utility updated for latest IMO load    Medial epicondylitis     Onychomycosis     Osteoarthritis     Peptic ulcer     Polyneuropathy     Vulvar malignant neoplasm (H)     Past Surgical History:   Procedure Laterality Date    BIOPSY BREAST Right     BIOPSY BREAST Right 01/28/2015    BIOPSY BREAST Right 01/28/2015    Procedure: RIGHT BREAST BIOPSY AFTER WIRE LOCALIZATION AT 0940;  Surgeon: Renée Soriano MD;  Location: [facility];  Service:     BIOPSY OF BREAST, INCISIONAL      Description: Incisional Breast Biopsy;  Recorded: 11/13/2007;  Comments: benign    COLONOSCOPY N/A 06/14/2019    Procedure: COLONOSCOPY;  Surgeon: Eduardo Mora MD;  Location: [facility];  Service: Gastroenterology    CV CORONARY ANGIOGRAM N/A 02/08/2024    Procedure: CV CORONARY ANGIOGRAM;  Surgeon: Moises Valencia MD;  Location: [facility]    CV LEFT HEART CATH N/A 02/08/2024    Procedure: Left Heart Catheterization;  Surgeon: Moises Valencia MD;  Location: [facility]    CV RIGHT HEART CATH MEASUREMENTS RECORDED N/A 02/08/2024    Procedure: Right Heart Catheterization;  Surgeon: Moises Valencia MD;  Location: [facility] CATH [facility]    CV TRANSCATHETER AORTIC VALVE REPLACEMENT-FEMORAL APPROACH N/A 02/20/2024    Procedure: Transcatheter Aortic Valve Replacement, possible cardiopulmonary bypass, possible surgical intervention;  Surgeon: Moises Valencia MD;  Location: [facility] CATH LAB CV    EP PACEMAKER DEVICE & IMPLANT- HIS LEAD DUAL N/A 3/7/2024    Procedure: Pacemaker Device & Lead Implant -  HIS Lead Dual;  Surgeon: Donnell Leon MD;  Location: [facility] CV    ESOPHAGOSCOPY, GASTROSCOPY, DUODENOSCOPY (EGD), COMBINED N/A 11/06/2018    Procedure: ESOPHAGOGASTRODUODENOSCOPY;  Surgeon: Lit Fernando MD;  Location: [facility] OR;  Service:     HYSTERECTOMY      JOINT REPLACEMENT Left     TKA    OR TRANSCATHETER AORTIC VALVE REPLACEMENT, FEMORAL PERCUTANEOUS APPROACH (STANDBY) N/A 02/20/2024    Procedure: OR TRANSCATHETER AORTIC VALVE REPLACEMENT, FEMORAL PERCUTANEOUS APPROACH (STANDBY);  Surgeon: Ishmael Farias MD;  Location: [facility] CV    PICC TRIPLE LUMEN PLACEMENT  01/12/2023         ME ABLATE HEART DYSRHYTHM FOCUS      Description: Catheter Ablation Atrial Fibrillation;  Recorded: 07/31/2012;  Comments: 7/24/12 PVI with Dr. Gardiner and nilay to all 5 pulm veins and CTI fl ablation line as well.    TRANSCATHETER AORTIC-VALVE REPLACEMENT      ZZC SUPRACERV ABD HYSTERECTOMY      Description: Supracervical Hysterectomy;  Proc Date: 01/01/1985;  Comments: some cervix left!; ovaries intact; done for bleeding    Family History   Problem Relation Age of Onset    Heart Failure Mother     Cancer Other         paternal HX-laryngeal     Alcoholism Sister     No Known Problems Daughter     No Known Problems Maternal Grandmother     No Known Problems Maternal Grandfather     No Known Problems Paternal Grandmother     No Known Problems Paternal Grandfather     No Known Problems Maternal Aunt     No Known Problems Paternal Aunt     Alcoholism Sister     Alcoholism Brother     Alcoholism Father     Cancer Paternal Uncle         Gastric-Alcohol    Cancer Paternal Uncle         gastric-Alcohol    Hereditary Breast and Ovarian Cancer Syndrome No family hx of     Breast Cancer No family hx of     Colon Cancer No family hx of     Endometrial Cancer No family hx of     Ovarian Cancer No family hx of     Social History     Socioeconomic History    Marital status:      Spouse  name: Not on file    Number of children: Not on file    Years of education: Not on file    Highest education level: Not on file   Occupational History    Not on file   Tobacco Use    Smoking status: Some Days     Current packs/day: 0.25     Types: Cigarettes     Passive exposure: Never    Smokeless tobacco: Never    Tobacco comments:     seen by TTS inpatient on 3/31/22   Vaping Use    Vaping status: Never Used   Substance and Sexual Activity    Alcohol use: Yes     Comment: Alcoholic Drinks/day: very little    Drug use: No    Sexual activity: Not on file   Other Topics Concern    Not on file   Social History Narrative    Not on file     Social Determinants of Health     Financial Resource Strain: Low Risk  (12/26/2023)    Financial Resource Strain     Within the past 12 months, have you or your family members you live with been unable to get utilities (heat, electricity) when it was really needed?: No   Food Insecurity: Low Risk  (12/26/2023)    Food Insecurity     Within the past 12 months, did you worry that your food would run out before you got money to buy more?: No     Within the past 12 months, did the food you bought just not last and you didn t have money to get more?: No   Transportation Needs: Low Risk  (12/26/2023)    Transportation Needs     Within the past 12 months, has lack of transportation kept you from medical appointments, getting your medicines, non-medical meetings or appointments, work, or from getting things that you need?: No   Physical Activity: Not on file   Stress: Not on file   Social Connections: Not on file   Interpersonal Safety: Low Risk  (4/1/2024)    Interpersonal Safety     Do you feel physically and emotionally safe where you currently live?: Yes     Within the past 12 months, have you been hit, slapped, kicked or otherwise physically hurt by someone?: No     Within the past 12 months, have you been humiliated or emotionally abused in other ways by your partner or ex-partner?:  "No   Housing Stability: Low Risk  (12/26/2023)    Housing Stability     Do you have housing? : Yes     Are you worried about losing your housing?: No          Medications  Allergies   Current Outpatient Medications   Medication Sig Dispense Refill    ACCU-CHEK SOFTCLIX LANCETS lancets [ACCU-CHEK SOFTCLIX LANCETS LANCETS] TEST THREE TIMES DAILY 300 each 3    albuterol (PROAIR HFA/PROVENTIL HFA/VENTOLIN HFA) 108 (90 Base) MCG/ACT inhaler INHALE 2 PUFFS INTO THE LUNGS EVERY 4 HOURS AS NEEDED FOR WHEEZING 13.4 g 1    apixaban ANTICOAGULANT (ELIQUIS) 5 MG tablet Take 1 tablet (5 mg) by mouth 2 times daily 180 tablet 2    atorvastatin (LIPITOR) 20 MG tablet TAKE 1 TABLET(20 MG) BY MOUTH AT BEDTIME 90 tablet 3    BD ULTRA-FINE SHORT PEN NEEDLE 31 gauge x 5/16\" Ndle [BD ULTRA-FINE SHORT PEN NEEDLE 31 GAUGE X 5/16\" NDLE] TEST FOUR TIMES DAILY WITH MEALS AND AT BEDTIME 400 each 3    blood glucose (ONETOUCH VERIO IQ) test strip TEST THREE TIMES DAILY 300 strip 1    blood-glucose meter (ONETOUCH VERIO IQ METER) Misc [BLOOD-GLUCOSE METER (ONETOUCH VERIO IQ METER) MISC] Check blood sugar three times a day. 1 each 0    budesonide-formoterol (SYMBICORT) 160-4.5 MCG/ACT Inhaler INHALE 2 PUFFS INTO THE LUNGS TWICE DAILY 10.2 g 4    diaper,brief,adult,disposable (ADULT BRIEFS - LARGE) Misc [DIAPER,BRIEF,ADULT,DISPOSABLE (ADULT BRIEFS - LARGE) MISC] Use 3-4 daily as needed for incontinence 120 each 6    diltiazem ER COATED BEADS (CARDIZEM CD/CARTIA XT) 120 MG 24 hr capsule Take 120 mg by mouth daily      Emollient (EUCERIN ORIGINAL HEALING) LOTN APPLY LIBERALLY TO DRY SKIN TWICE DAILY AS NEEDED      furosemide (LASIX) 20 MG tablet Take 2 tablets (40 mg) by mouth daily 180 tablet 1    gabapentin (NEURONTIN) 600 MG tablet TAKE 1 TABLET(600 MG) BY MOUTH THREE TIMES DAILY 270 tablet 2    generic lancets (FINGERSTIX LANCETS) [GENERIC LANCETS (FINGERSTIX LANCETS)] Dispense brand per patient's insurance at pharmacy discretion. 300 each 0    " "ipratropium - albuterol 0.5 mg/2.5 mg/3 mL (DUONEB) 0.5-2.5 (3) MG/3ML neb solution INHALE 1 VIAL VIA NEBULIZER EVERY 6 HOURS AS NEEDED FOR SHORTNESS OF BREATH OR WHEEZING 90 mL 0    JARDIANCE 10 MG TABS tablet TAKE 1 TABLET(10 MG) BY MOUTH DAILY 90 tablet 2    magnesium oxide (MAG-OX) 400 MG tablet Take 1 tablet (400 mg) by mouth daily 30 tablet 0    metoprolol tartrate (LOPRESSOR) 25 MG tablet Take 1 tablet (25 mg) by mouth 2 times daily 60 tablet 3    mometasone-formoterol (DULERA) 200-5 MCG/ACT inhaler INHALE 2 PUFFS INTO THE LUNGS TWICE DAILY 39 g 3    neomycin-polymyxin-pramoxine (NEOSPORIN PLUS) 1 % external cream Apply topically to wound BID until healed. 28 g 0    Nutritional Supplements (ENSURE COMPLETE) LIQD Take 1 Can by mouth daily 7110 mL 11    nystatin (NYSTOP) 838498 UNIT/GM external powder APPLY TO THE AFFECTED AREA(S) 2-3 TIMES DAILY AS NEEDED 180 g 0    omeprazole (PRILOSEC) 20 MG DR capsule TAKE 1 CAPSULE(20 MG) BY MOUTH TWICE DAILY 180 capsule 1    oxyCODONE IR (ROXICODONE) 10 MG tablet Take 1 tablet (10 mg) by mouth every 6 hours as needed for moderate to severe pain 120 tablet 0    potassium chloride ER (KLOR-CON M) 20 MEQ CR tablet TAKE 1 TABLET(20 MEQ) BY MOUTH DAILY 90 tablet 3    sucralfate (CARAFATE) 1 GM tablet CRUSH ONE TABLET AND MIX WITH A LLITTLE WATER AND SWALLOW FOUR TIMES DAILY 360 tablet 3    meclizine (ANTIVERT) 25 MG tablet TAKE 1 TABLET(25 MG) BY MOUTH THREE TIMES DAILY AS NEEDED FOR DIZZINESS OR NAUSEA (Patient not taking: Reported on 5/22/2024) 45 tablet 5    naloxone (NARCAN) 4 MG/0.1ML nasal spray Spray 1 spray (4 mg) into one nostril alternating nostrils once as needed for opioid reversal every 2-3 minutes until assistance arrives 0.2 mL 0    Allergies   Allergen Reactions    Celebrex [Celecoxib] Rash     patient had butterfly rash - \"lupus-like\"      Latex Rash         Lab Results    Chemistry/lipid CBC Cardiac Enzymes/BNP/TSH/INR   Lab Results   Component Value Date    " CHOL 167 09/20/2023    HDL 75 09/20/2023    TRIG 83 09/20/2023    BUN 15.5 05/22/2024     05/22/2024    CO2 35 (H) 05/22/2024    Lab Results   Component Value Date    WBC 7.0 04/24/2024    HGB 11.7 04/24/2024    HCT 39.3 04/24/2024    MCV 95 04/24/2024     04/24/2024    Lab Results   Component Value Date    TROPONINI 0.07 05/24/2023     (H) 06/02/2023    TSH 1.17 05/24/2023    INR 1.04 02/19/2024             This note has been dictated using voice recognition software. Any grammatical, typographical, or context distortions are unintentional and inherent to the software    30 minutes spent on the date of encounter doing chart review, review of outside records, review of test results, interpretation with above tests, patient visit, documentation, and discussion with family.

## 2024-05-28 ENCOUNTER — OFFICE VISIT (OUTPATIENT)
Dept: FAMILY MEDICINE | Facility: CLINIC | Age: 74
End: 2024-05-28
Payer: COMMERCIAL

## 2024-05-28 VITALS
HEART RATE: 68 BPM | OXYGEN SATURATION: 83 % | TEMPERATURE: 97.8 F | DIASTOLIC BLOOD PRESSURE: 60 MMHG | SYSTOLIC BLOOD PRESSURE: 102 MMHG

## 2024-05-28 DIAGNOSIS — E11.649 TYPE 2 DIABETES MELLITUS WITH HYPOGLYCEMIA WITHOUT COMA, WITHOUT LONG-TERM CURRENT USE OF INSULIN (H): ICD-10-CM

## 2024-05-28 DIAGNOSIS — M54.6 TRIGGER POINT OF THORACIC REGION: ICD-10-CM

## 2024-05-28 DIAGNOSIS — G89.4 CHRONIC PAIN SYNDROME: ICD-10-CM

## 2024-05-28 DIAGNOSIS — E11.42 DIABETIC POLYNEUROPATHY ASSOCIATED WITH TYPE 2 DIABETES MELLITUS (H): ICD-10-CM

## 2024-05-28 DIAGNOSIS — Z29.11 NEED FOR VACCINATION AGAINST RESPIRATORY SYNCYTIAL VIRUS: ICD-10-CM

## 2024-05-28 DIAGNOSIS — I50.33 ACUTE ON CHRONIC DIASTOLIC HEART FAILURE (H): ICD-10-CM

## 2024-05-28 DIAGNOSIS — J43.9 PULMONARY EMPHYSEMA, UNSPECIFIED EMPHYSEMA TYPE (H): ICD-10-CM

## 2024-05-28 DIAGNOSIS — M79.7 FIBROMYALGIA: Primary | ICD-10-CM

## 2024-05-28 DIAGNOSIS — Z79.899 MEDICATION MANAGEMENT: ICD-10-CM

## 2024-05-28 DIAGNOSIS — F11.20 CONTINUOUS OPIOID DEPENDENCE (H): ICD-10-CM

## 2024-05-28 PROCEDURE — 99214 OFFICE O/P EST MOD 30 MIN: CPT | Mod: 24 | Performed by: FAMILY MEDICINE

## 2024-05-28 PROCEDURE — 20553 NJX 1/MLT TRIGGER POINTS 3/>: CPT | Performed by: FAMILY MEDICINE

## 2024-05-28 RX ORDER — OXYCODONE HYDROCHLORIDE 10 MG/1
10 TABLET ORAL EVERY 6 HOURS PRN
Qty: 120 TABLET | Refills: 0 | Status: SHIPPED | OUTPATIENT
Start: 2024-05-28 | End: 2024-06-25

## 2024-05-28 RX ORDER — RESPIRATORY SYNCYTIAL VIRUS VACCINE 120MCG/0.5
0.5 KIT INTRAMUSCULAR ONCE
Qty: 1 EACH | Refills: 0 | Status: CANCELLED | OUTPATIENT
Start: 2024-05-28 | End: 2024-05-28

## 2024-05-28 NOTE — PROGRESS NOTES
1. Fibromyalgia  2. Trigger point of thoracic region  3. F11.2 - Continuous opioid dependence (H)  4. Chronic pain syndrome    - oxyCODONE IR (ROXICODONE) 10 MG tablet; Take 1 tablet (10 mg) by mouth every 6 hours as needed for moderate to severe pain  Dispense: 120 tablet; Refill: 0    [facility]    Procedure: MSK: Inject Trigger Points, >3    Date/Time: 5/28/2024 6:17 PM    Performed by: Cammy Bui MD  Authorized by: Cammy Bui MD      UNIVERSAL PROTOCOL   Site Marked: Yes  Prior Images Obtained and Reviewed:  NA  Required items: Required blood products, implants, devices and special equipment available ((NA))    Patient identity confirmed:  Verbally with patient  NA - No sedation, light sedation, or local anesthesia  Confirmation Checklist:  Patient's identity using two indicators, procedure was appropriate and matched the consent or emergent situation and correct equipment/implants were available  Time out: Immediately prior to the procedure a time out was called    Universal Protocol: the Joint Commission Universal Protocol was followed    Preparation: Patient was prepped and draped in usual sterile fashion    ESBL (mL):  0     ANESTHESIA    Anesthesia:  Local infiltration  Local Anesthetic:  Lidocaine 1% without epinephrine  Anesthetic Total (mL):  10      SEDATION    Patient Sedated: No      PROCEDURE  Describe Procedure: 6 lower cervical/upper thoracic trigger points identified by palpation - these were marked and 1.66 ml of 1%Lidocaine was injected into each of these.  No complications.    Patient Tolerance:  Patient tolerated the procedure well with no immediate complications  Length of time physician/provider present for 1:1 monitoring during sedation: 0      5. Pulmonary emphysema, unspecified emphysema type (H)  H/o COPD/emphysema - stable currently     6. Type 2 diabetes mellitus with hypoglycemia without coma, without long-term  current use of insulin (H)  7. Diabetic polyneuropathy associated with type 2 diabetes mellitus (H)  This is a 73 yo with longstanding fairly well controlled type II DM - does take Jardiance - needs refill on this.    - empagliflozin (JARDIANCE) 10 MG TABS tablet; Take 1 tablet (10 mg) by mouth daily  Dispense: 90 tablet; Refill: 2    8. Acute on chronic diastolic heart failure (H)  Uses SGLT2 for heart/Type II DM   - empagliflozin (JARDIANCE) 10 MG TABS tablet; Take 1 tablet (10 mg) by mouth daily  Dispense: 90 tablet; Refill: 2    9. Medication management  Reviewed medications -     10. Need for vaccination against respiratory syncytial virus  Due for RSV vaccine - discussed - needs to get this at pharmacy       Carol Muñiz is a 74 year old, presenting for the following health issues:  Imm/Inj (Trigger point shots)        5/28/2024     4:48 PM   Additional Questions   Roomed by Samia Farrar filled 4/30/2024 - Oxycodone - per     History of Present Illness       Reason for visit:  Trigger point shots    She eats 0-1 servings of fruits and vegetables daily.She consumes 0 sweetened beverage(s) daily.She exercises with enough effort to increase her heart rate 9 or less minutes per day.  She exercises with enough effort to increase her heart rate 3 or less days per week.   She is taking medications regularly.       Review of Systems   Constitutional:  Negative for chills and fever.   HENT:  Negative for ear pain and sore throat.    Eyes:  Negative for pain and visual disturbance.   Respiratory:  Negative for cough and shortness of breath.    Cardiovascular:  Negative for chest pain and palpitations.   Gastrointestinal:  Negative for abdominal pain and vomiting.   Genitourinary:  Negative for dysuria and hematuria.   Musculoskeletal:  Negative for arthralgias and back pain.        Trigger point tenderness - mostly related to upper thoracic/lower cervical paraspinal muscles    Skin:  Negative for color  change and rash.   Neurological:  Negative for seizures and syncope.   All other systems reviewed and are negative.          Objective    /60 (BP Location: Right arm, Patient Position: Sitting, Cuff Size: Adult Regular)   Pulse 68   Temp 97.8  F (36.6  C) (Temporal)   LMP  (LMP Unknown)   SpO2 (!) 83%   There is no height or weight on file to calculate BMI.  Physical Exam  Vitals reviewed.   Constitutional:       General: She is not in acute distress.     Appearance: Normal appearance. She is ill-appearing (but appears stronger than previous).   HENT:      Head: Normocephalic.      Right Ear: Tympanic membrane, ear canal and external ear normal.      Left Ear: Tympanic membrane, ear canal and external ear normal.      Nose: Nose normal.      Mouth/Throat:      Mouth: Mucous membranes are moist.      Pharynx: No posterior oropharyngeal erythema.   Eyes:      Extraocular Movements: Extraocular movements intact.      Conjunctiva/sclera: Conjunctivae normal.      Pupils: Pupils are equal, round, and reactive to light.   Cardiovascular:      Rate and Rhythm: Normal rate and regular rhythm.      Pulses: Normal pulses.      Heart sounds: Murmur heard.   Pulmonary:      Effort: Pulmonary effort is normal. No respiratory distress.      Breath sounds: Wheezing present.      Comments: O2 sats are low - doesn't use oxygen when she comes to the office - wears home oxygen  Abdominal:      Palpations: Abdomen is soft. There is no mass.      Tenderness: There is no abdominal tenderness. There is no guarding or rebound.   Musculoskeletal:         General: No deformity. Normal range of motion.      Cervical back: Normal range of motion and neck supple.   Lymphadenopathy:      Cervical: No cervical adenopathy.   Skin:     General: Skin is warm and dry.   Neurological:      General: No focal deficit present.      Mental Status: She is alert.   Psychiatric:         Mood and Affect: Mood normal.         Behavior: Behavior  normal.            No results found for any visits on 05/28/24.    Prior to immunization administration, verified patients identity using patient s name and date of birth. Please see Immunization Activity for additional information.     Screening Questionnaire for Adult Immunization    Are you sick today?   No   Do you have allergies to medications, food, a vaccine component or latex?   Yes   Have you ever had a serious reaction after receiving a vaccination?   No   Do you have a long-term health problem with heart, lung, kidney, or metabolic disease (e.g., diabetes), asthma, a blood disorder, no spleen, complement component deficiency, a cochlear implant, or a spinal fluid leak?  Are you on long-term aspirin therapy?   Yes   Do you have cancer, leukemia, HIV/AIDS, or any other immune system problem?   No   Do you have a parent, brother, or sister with an immune system problem?   No   In the past 3 months, have you taken medications that affect  your immune system, such as prednisone, other steroids, or anticancer drugs; drugs for the treatment of rheumatoid arthritis, Crohn s disease, or psoriasis; or have you had radiation treatments?   No   Have you had a seizure, or a brain or other nervous system problem?   No   During the past year, have you received a transfusion of blood or blood    products, or been given immune (gamma) globulin or antiviral drug?   No   For women: Are you pregnant or is there a chance you could become       pregnant during the next month?   No   Have you received any vaccinations in the past 4 weeks?   No     Immunization questionnaire was positive for at least one answer.  Notified .      Patient instructed to remain in clinic for 15 minutes afterwards, and to report any adverse reactions.     Screening performed by Samia Patel MA on 5/28/2024 at 4:52 PM.         Signed Electronically by: SAVANA SILVER MD

## 2024-06-02 DIAGNOSIS — J44.0 CHRONIC OBSTRUCTIVE PULMONARY DISEASE WITH ACUTE LOWER RESPIRATORY INFECTION (H): ICD-10-CM

## 2024-06-02 ASSESSMENT — ENCOUNTER SYMPTOMS
ARTHRALGIAS: 0
ABDOMINAL PAIN: 0
SHORTNESS OF BREATH: 0
BACK PAIN: 0
HEMATURIA: 0
FEVER: 0
PALPITATIONS: 0
SORE THROAT: 0
DYSURIA: 0
COLOR CHANGE: 0
CHILLS: 0
COUGH: 0
SEIZURES: 0
EYE PAIN: 0
VOMITING: 0

## 2024-06-04 RX ORDER — ALBUTEROL SULFATE 90 UG/1
2 AEROSOL, METERED RESPIRATORY (INHALATION) EVERY 4 HOURS PRN
Qty: 13.4 G | Refills: 1 | Status: SHIPPED | OUTPATIENT
Start: 2024-06-04

## 2024-06-05 NOTE — PROGRESS NOTES
[facility] Care Coordination Contact      [facility] Health Plan or Product Change    CC received notification that member's health plan or health plan product changed from [employer] to [employer] effective 5/1/2024.  CC contacted member and discussed change and face-to-face visit was offered. Member declined need for home visit. CC reviewed previous Health Risk Assessment/LTCC and POC with member and no changes noted.    Member stated she saw PCP on April 30th and reminded PCP about the need for supplemental drinks. Member would like a scooter and information for home delivered meals.     Upon chart review, there was no documentation regarding supplemental drinks.     Shared with member  of the information below regarding to scooter and supplemental drinks.    Scooter: Need a face to face office visit with PCP and referral to see PT/OT for a  sitting evaluation.    PCP, PT/OT will need to submit documentation to a DME vendor.    Supplemental drinks: PCP needs to indicate the below information on office visit notes.  Ensure plus protein, regular Boost or Ensure Regular or an insurance covered supplemental drink  Number of quantity  Note indicated that you had discussion with PCP regarding to the need for supplemental drinks    Provided [employer] website to member so she can view their menus. Explained to member home delivered meals is a waiver service and CC can put in referral for the home delivered meals if needed.     Transitional HRA completed. Care Plan Summary updated and reflects current services.  Required referral authorization information communicated to CMS: Yes  Writer reviewed the following with member:  ER visits: No  Hospitalizations: Yes -  [facility]  TCU stays: No  Significant health status changes: No  Falls/Injuries: No  ADL/IADL changes: No  Changes in services: No    Follow-Up Plan: Member informed of future contact, plan to f/u with member with at next  regularly scheduled contact.  Contact information shared with member and family, encouraged member to call with any questions or concerns.  Samantha Alexandra RN, N   [employer]  [phone]

## 2024-06-17 PROBLEM — Z71.89 ADVANCED DIRECTIVES, COUNSELING/DISCUSSION: Status: RESOLVED | Noted: 2020-08-17 | Resolved: 2024-06-17

## 2024-06-25 ENCOUNTER — OFFICE VISIT (OUTPATIENT)
Dept: FAMILY MEDICINE | Facility: CLINIC | Age: 74
End: 2024-06-25
Payer: COMMERCIAL

## 2024-06-25 VITALS
HEART RATE: 64 BPM | DIASTOLIC BLOOD PRESSURE: 69 MMHG | TEMPERATURE: 97.6 F | SYSTOLIC BLOOD PRESSURE: 112 MMHG | OXYGEN SATURATION: 85 %

## 2024-06-25 DIAGNOSIS — Z95.2 S/P TAVR (TRANSCATHETER AORTIC VALVE REPLACEMENT): ICD-10-CM

## 2024-06-25 DIAGNOSIS — J44.0 CHRONIC OBSTRUCTIVE PULMONARY DISEASE WITH ACUTE LOWER RESPIRATORY INFECTION (H): ICD-10-CM

## 2024-06-25 DIAGNOSIS — Z79.899 MEDICATION MANAGEMENT: ICD-10-CM

## 2024-06-25 DIAGNOSIS — J43.9 PULMONARY EMPHYSEMA, UNSPECIFIED EMPHYSEMA TYPE (H): ICD-10-CM

## 2024-06-25 DIAGNOSIS — Z91.81 RISK FOR FALLS: ICD-10-CM

## 2024-06-25 DIAGNOSIS — I50.33 ACUTE ON CHRONIC DIASTOLIC HEART FAILURE (H): ICD-10-CM

## 2024-06-25 DIAGNOSIS — E11.42 DIABETIC POLYNEUROPATHY ASSOCIATED WITH TYPE 2 DIABETES MELLITUS (H): ICD-10-CM

## 2024-06-25 DIAGNOSIS — G89.4 CHRONIC PAIN SYNDROME: Primary | ICD-10-CM

## 2024-06-25 DIAGNOSIS — F11.20 CONTINUOUS OPIOID DEPENDENCE (H): ICD-10-CM

## 2024-06-25 DIAGNOSIS — H61.23 IMPACTED CERUMEN, BILATERAL: ICD-10-CM

## 2024-06-25 DIAGNOSIS — Z29.11 NEED FOR VACCINATION AGAINST RESPIRATORY SYNCYTIAL VIRUS: ICD-10-CM

## 2024-06-25 DIAGNOSIS — M62.81 GENERALIZED MUSCLE WEAKNESS: ICD-10-CM

## 2024-06-25 DIAGNOSIS — M79.7 FIBROMYALGIA: ICD-10-CM

## 2024-06-25 PROCEDURE — 20553 NJX 1/MLT TRIGGER POINTS 3/>: CPT | Performed by: FAMILY MEDICINE

## 2024-06-25 PROCEDURE — 99214 OFFICE O/P EST MOD 30 MIN: CPT | Mod: 25 | Performed by: FAMILY MEDICINE

## 2024-06-25 RX ORDER — OXYCODONE HYDROCHLORIDE 10 MG/1
10 TABLET ORAL EVERY 6 HOURS PRN
Qty: 120 TABLET | Refills: 0 | Status: SHIPPED | OUTPATIENT
Start: 2024-06-25 | End: 2024-07-29

## 2024-06-25 RX ORDER — RESPIRATORY SYNCYTIAL VIRUS VACCINE 120MCG/0.5
0.5 KIT INTRAMUSCULAR ONCE
Qty: 1 EACH | Refills: 0 | Status: CANCELLED | OUTPATIENT
Start: 2024-06-25 | End: 2024-06-25

## 2024-06-25 ASSESSMENT — PAIN SCALES - GENERAL: PAINLEVEL: EXTREME PAIN (8)

## 2024-06-25 NOTE — PROGRESS NOTES
1. Chronic pain syndrome  Patient has chronic pain syndrome - uses Oxycodone - has not increased use over time - discussed - has underlying low back pain/degenerative disc disease as well as arthritis and fibromyalgia -   - oxyCODONE IR (ROXICODONE) 10 MG tablet; Take 1 tablet (10 mg) by mouth every 6 hours as needed for moderate to severe pain  Dispense: 120 tablet; Refill: 0  - Wheelchair Scooter Order for DME with OT or PT Referral; Future    2. Fibromyalgia  Here for trigger point injections today   - oxyCODONE IR (ROXICODONE) 10 MG tablet; Take 1 tablet (10 mg) by mouth every 6 hours as needed for moderate to severe pain  Dispense: 120 tablet; Refill: 0  - Wheelchair Scooter Order for DME with OT or PT Referral; Future  [facility]    Procedure: MSK: Inject Trigger Points, >3    Date/Time: 6/25/2024 6:30 PM    Performed by: Cammy Bui MD  Authorized by: Cammy Bui MD      UNIVERSAL PROTOCOL   Site Marked: Yes  Prior Images Obtained and Reviewed:  NA  Required items: Required blood products, implants, devices and special equipment available (NA)    Patient identity confirmed:  Verbally with patient  NA - No sedation, light sedation, or local anesthesia  Confirmation Checklist:  Patient's identity using two indicators  Time out: Immediately prior to the procedure a time out was called    Universal Protocol: the Joint Commission Universal Protocol was followed    ESBL (mL):  0    SEDATION    Patient Sedated: No      PROCEDURE    Length of time physician/provider present for 1:1 monitoring during sedation: 0    Procedure Note - Trigger Point Injections    Consent obtained: verbal    Indication:  fibromyalgia/trigger point pain     6 trigger points identified.  Each trigger point swabbed x 3 with Betadine swab.  Each trigger point then injected with 1.6 ml of 1% Lidocaine (no epi).    Total volume injected:  1- ml    Complications:  None, patient  tolerated procedure well.    Plan:  Discussed care with patient.  RTC for further injections in 30 days prn.        3. Need for vaccination against respiratory syncytial virus  Due for rSV vaccine - discussed     4. Pulmonary emphysema, unspecified emphysema type (H)  H/o COPD/emphysema - no longer smoking - continues to be oxygen dependent    5. Diabetic polyneuropathy associated with type 2 diabetes mellitus (H)  Patient has polyneuropathy related to her diabetes - this contributed to her fall risk, poor ambulation - would benefit from a motorized scooter/wheelchair  - Wheelchair Scooter Order for DME with OT or PT Referral; Future    6. Medication management  Reviewed medication list     7. Chronic obstructive pulmonary disease with acute lower respiratory infection (H)  As above - patient has poor exercise tolerance and this decreases her ambulation independence   - Wheelchair Scooter Order for DME with OT or PT Referral; Future    8. Continuous opioid dependence (H)  As above   - Wheelchair Scooter Order for DME with OT or PT Referral; Future    9. Acute on chronic diastolic heart failure (H)  Patient has valvular heart disease with chronic heart failure -   - Wheelchair Scooter Order for DME with OT or PT Referral; Future    10. S/P TAVR (transcatheter aortic valve replacement)  As above - patient has valvular heart disease, now s/p TAVR  - Wheelchair Scooter Order for DME with OT or PT Referral; Future    11. Generalized muscle weakness  Patient has generalized muscle weakness - at risk for falls   - Wheelchair Scooter Order for DME with OT or PT Referral; Future    12. Risk for falls  This patient would benefit from a motorized scooter/wheelchair due to chronic pain, arthritis, general frailty.  Will refer for further evaluation.    - Wheelchair Scooter Order for DME with OT or PT Referral; Future    13. Impacted cerumen, bilateral  Impacted cerumen - patient desires Debrox for home use.    - carbamide  peroxide (DEBROX) 6.5 % otic solution; Place 5 drops into both ears 2 times daily  Dispense: 15 mL; Refill: 1      Subjective   Mary Kay is a 74 year old, presenting for the following health issues:  Imm/Inj (Shoulder injections) and Ear Problem (both)        5/28/2024     4:48 PM   Additional Questions   Roomed by Samia Farrar Oxycodone refill 5/29/2024 per     Needs Ensure Plus    Should have BID (for meal replacement)   Poor nutrition, poor weight gain   Discussed with patient.      Scooter - needs referral -   Would be able to get in/out of house as well as inside home         Thinks she needs a throat doctor - had GERD in past  Lately choking/    History of Present Illness       Reason for visit:  Injections    She eats 0-1 servings of fruits and vegetables daily.She consumes 0 sweetened beverage(s) daily.She exercises with enough effort to increase her heart rate 9 or less minutes per day.  She exercises with enough effort to increase her heart rate 3 or less days per week.   She is taking medications regularly.         Review of Systems   Constitutional:  Positive for fatigue and unexpected weight change (losing weight). Negative for chills and fever.   HENT:  Negative for ear pain and sore throat.    Eyes:  Negative for pain and visual disturbance.   Respiratory:  Negative for cough and shortness of breath.    Cardiovascular:  Negative for chest pain and palpitations.   Gastrointestinal:  Negative for abdominal pain and vomiting.   Genitourinary:  Negative for dysuria and hematuria.   Musculoskeletal:  Positive for arthralgias, back pain, gait problem, myalgias and neck pain.   Skin:  Negative for color change and rash.   Neurological:  Positive for weakness. Negative for seizures and syncope.   Psychiatric/Behavioral:  Positive for sleep disturbance.    All other systems reviewed and are negative.          Objective    /69 (BP Location: Right arm, Patient Position: Sitting, Cuff Size: Adult  Small)   Pulse 64   Temp 97.6  F (36.4  C) (Temporal)   LMP  (LMP Unknown)   SpO2 (!) 85%   There is no height or weight on file to calculate BMI.  Physical Exam  Vitals reviewed.   Constitutional:       General: She is not in acute distress.     Appearance: Normal appearance.      Comments: frail   HENT:      Head: Normocephalic.      Right Ear: Tympanic membrane, ear canal and external ear normal.      Left Ear: Tympanic membrane, ear canal and external ear normal.      Nose: Nose normal.      Mouth/Throat:      Mouth: Mucous membranes are moist.      Pharynx: No posterior oropharyngeal erythema.   Eyes:      Extraocular Movements: Extraocular movements intact.      Conjunctiva/sclera: Conjunctivae normal.      Pupils: Pupils are equal, round, and reactive to light.   Cardiovascular:      Rate and Rhythm: Normal rate and regular rhythm.      Pulses: Normal pulses.      Heart sounds: Normal heart sounds. No murmur heard.  Pulmonary:      Effort: Pulmonary effort is normal.      Breath sounds: Normal breath sounds.   Abdominal:      Palpations: Abdomen is soft. There is no mass.      Tenderness: There is no abdominal tenderness. There is no guarding or rebound.   Musculoskeletal:         General: No deformity. Normal range of motion.      Cervical back: Normal range of motion and neck supple.   Lymphadenopathy:      Cervical: No cervical adenopathy.   Skin:     General: Skin is warm and dry.   Neurological:      General: No focal deficit present.      Mental Status: She is alert.      Gait: Gait abnormal (multifactorial).   Psychiatric:         Mood and Affect: Mood normal.         Behavior: Behavior normal.            No results found for any visits on 06/25/24.      Prior to immunization administration, verified patients identity using patient s name and date of birth. Please see Immunization Activity for additional information.     Screening Questionnaire for Adult Immunization    Are you sick today?   No    Do you have allergies to medications, food, a vaccine component or latex?   Yes   Have you ever had a serious reaction after receiving a vaccination?   No   Do you have a long-term health problem with heart, lung, kidney, or metabolic disease (e.g., diabetes), asthma, a blood disorder, no spleen, complement component deficiency, a cochlear implant, or a spinal fluid leak?  Are you on long-term aspirin therapy?   Yes   Do you have cancer, leukemia, HIV/AIDS, or any other immune system problem?   No   Do you have a parent, brother, or sister with an immune system problem?   No   In the past 3 months, have you taken medications that affect  your immune system, such as prednisone, other steroids, or anticancer drugs; drugs for the treatment of rheumatoid arthritis, Crohn s disease, or psoriasis; or have you had radiation treatments?   No   Have you had a seizure, or a brain or other nervous system problem?   No   During the past year, have you received a transfusion of blood or blood    products, or been given immune (gamma) globulin or antiviral drug?   Don't Know   For women: Are you pregnant or is there a chance you could become       pregnant during the next month?   No   Have you received any vaccinations in the past 4 weeks?   No     Immunization questionnaire was positive for at least one answer.  Notified .      Patient instructed to remain in clinic for 15 minutes afterwards, and to report any adverse reactions.     Screening performed by Samia Patel MA on 6/25/2024 at 4:04 PM.       Signed Electronically by: SAVANA SILVER MD

## 2024-06-26 DIAGNOSIS — E78.2 MIXED HYPERLIPIDEMIA: ICD-10-CM

## 2024-06-26 DIAGNOSIS — E11.649 TYPE 2 DIABETES MELLITUS WITH HYPOGLYCEMIA WITHOUT COMA, WITHOUT LONG-TERM CURRENT USE OF INSULIN (H): ICD-10-CM

## 2024-06-26 RX ORDER — ATORVASTATIN CALCIUM 20 MG/1
TABLET, FILM COATED ORAL
Qty: 90 TABLET | Refills: 2 | Status: ON HOLD | OUTPATIENT
Start: 2024-06-26 | End: 2024-09-09

## 2024-06-30 ASSESSMENT — ENCOUNTER SYMPTOMS
NECK PAIN: 1
FEVER: 0
HEMATURIA: 0
SORE THROAT: 0
COLOR CHANGE: 0
VOMITING: 0
DYSURIA: 0
COUGH: 0
SLEEP DISTURBANCE: 1
UNEXPECTED WEIGHT CHANGE: 1
FATIGUE: 1
WEAKNESS: 1
PALPITATIONS: 0
MYALGIAS: 1
ARTHRALGIAS: 1
SHORTNESS OF BREATH: 0
CHILLS: 0
ABDOMINAL PAIN: 0
EYE PAIN: 0
BACK PAIN: 1
SEIZURES: 0

## 2024-07-01 ENCOUNTER — OFFICE VISIT (OUTPATIENT)
Dept: PHARMACY | Facility: OTHER | Age: 74
End: 2024-07-01
Payer: COMMERCIAL

## 2024-07-01 DIAGNOSIS — E11.9 TYPE 2 DIABETES MELLITUS WITHOUT COMPLICATION, WITHOUT LONG-TERM CURRENT USE OF INSULIN (H): Primary | ICD-10-CM

## 2024-07-01 DIAGNOSIS — G89.4 CHRONIC PAIN SYNDROME: ICD-10-CM

## 2024-07-01 DIAGNOSIS — K21.00 GASTROESOPHAGEAL REFLUX DISEASE WITH ESOPHAGITIS, UNSPECIFIED WHETHER HEMORRHAGE: ICD-10-CM

## 2024-07-01 DIAGNOSIS — I10 ESSENTIAL HYPERTENSION: ICD-10-CM

## 2024-07-01 DIAGNOSIS — E78.5 DYSLIPIDEMIA: ICD-10-CM

## 2024-07-01 DIAGNOSIS — I48.0 PAROXYSMAL ATRIAL FIBRILLATION (H): ICD-10-CM

## 2024-07-01 DIAGNOSIS — J43.9 PULMONARY EMPHYSEMA, UNSPECIFIED EMPHYSEMA TYPE (H): ICD-10-CM

## 2024-07-01 DIAGNOSIS — I50.32 CHRONIC HEART FAILURE WITH PRESERVED EJECTION FRACTION (H): ICD-10-CM

## 2024-07-01 PROCEDURE — 99605 MTMS BY PHARM NP 15 MIN: CPT | Performed by: PHARMACIST

## 2024-07-01 NOTE — LETTER
"Recommended To-Do List      Prepared on: Jul 1, 2024       You can get the best results from your medications by completing the items on this \"To-Do List.\"      Bring your To-Do List when you go to your doctor. And, share it with your family or caregivers.    My To-Do List:  What we talked about: What I should do:    What my medicines are for, how to know if my medicines are working, made sure my medicines are safe for me and reviewed how to take my medicines.      Take my medicines every day                "

## 2024-07-01 NOTE — PROGRESS NOTES
Medication Therapy Management (MTM) Encounter    ASSESSMENT:                            Medication Adherence/Access: No issues identified    Diabetes   Last A1C at goal <7%.     Hypertension /Atrial fibrillation/CHF:  Last clinic blood pressure at goal <130/80. Pulse at goal . Denies A. Fib episodes.      Hyperlipidemia   Last LDL at goal <100.        COPD   Denies shortness of breath concerns.        GERD    Patient feels that current regimen is effective.    Chronic pain:   Denies pain concerns at this time.       PLAN:                            No medication changes at this time.     Follow-up: Schedule with Pharmacist as needed     SUBJECTIVE/OBJECTIVE:                          Mary Kay Tejada is a 74 year old female seen for an initial visit. She was referred to me from insurance plan.      Reason for visit: Medication Review.    Allergies/ADRs: Reviewed in chart  Past Medical History: Reviewed in chart  Tobacco: She reports that she has been smoking cigarettes. She has never been exposed to tobacco smoke. She has never used smokeless tobacco.Nicotine/Tobacco Cessation Plan  Pharmacotherapies : Nicotine patch    Alcohol:  reports current alcohol use. Very infrequent.         Medication Adherence/Access: no issues reported    Diabetes     Jardiance 10 mg daily    Current diabetes symptoms: none    Hemoglobin A1C   Date Value Ref Range Status   02/06/2024 6.2 (H) <5.7 % Final     Comment:     Normal <5.7%   Prediabetes 5.7-6.4%    Diabetes 6.5% or higher     Note: Adopted from ADA consensus guidelines.   09/20/2023 6.1 (H) 0.0 - 5.6 % Final     Comment:     Normal <5.7%   Prediabetes 5.7-6.4%    Diabetes 6.5% or higher     Note: Adopted from ADA consensus guidelines.   05/25/2023 6.1 (H) <5.7 % Final     Comment:     Normal <5.7%   Prediabetes 5.7-6.4%    Diabetes 6.5% or higher     Note: Adopted from ADA consensus guidelines.                     Hypertension /Atrial fibrillation/CHF:    Apixaban 5 mg  twice daily  Diltiazem 120 mg ER daily  Jardiance 10 mg daily  Furosemide 20 mg 2 tabs daily  Metoprolol tartrate 25 mg twice daily  Potassium chloride 20 mEq daily    BP Readings from Last 3 Encounters:   06/25/24 112/69   05/28/24 102/60   05/22/24 96/52      Pulse Readings from Last 3 Encounters:   06/25/24 64   05/28/24 68   05/22/24 64         Hyperlipidemia     Atorvastatin 20 mg daily  Patient reports no significant myalgias or other side effects.    Recent Labs   Lab Test 09/20/23  1136 07/25/22  1026   CHOL 167 151   HDL 75 84   LDL 75 53   TRIG 83 72          COPD   Albuterol HFA 90 mcg 2 puffs every 4 hours as needed  Symbicort 160-4.5 mcg 2 puffs twice daily  ipratropium-albuterol 0.5-2.5 mg every 6 hours as needed    Patient rinses their mouth after using steroid inhaler.    Patient reports no current medication side effects.  .       GERD      Omeprazole 20 mg twice daily  Sucralfate 1 g 4 times daily    Patient reports no current symptoms.   Patient feels that current regimen is effective.          Chronic pain:   Gabapentin 600 mg 3 times daily  Oxycodone 10 mg IR every 6 hours as needed    Denies pain concerns at this time.       Today's Vitals: LMP  (LMP Unknown)   ----------------      I spent 8 minutes with this patient today. All changes were made via collaborative practice agreement with SAVANA SILVER MD. A copy of the visit note was provided to the patient's provider(s).    A summary of these recommendations was sent via [employer].    Barrington Ramos PharmD  Medication Therapy Management (MTM) Pharmacist  [facility]      Telemedicine Visit Details  Type of service:  Telephone visit  Start Time: 9:20 AM  End Time: 9:28 AM          Medication Therapy Recommendations  No medication therapy recommendations to display

## 2024-07-01 NOTE — PATIENT INSTRUCTIONS
"Recommendations from today's MTM visit:                                                         No medication changes at this time.     Follow-up: Schedule with Pharmacist as needed     It was great speaking with you today.  I value your experience and would be very thankful for your time in providing feedback in our clinic survey. In the next few days, you may receive an email or text message from [employer] with a link to a survey related to your  clinical pharmacist.\"     To schedule another MTM appointment, please call the clinic directly or you may call the MTM scheduling line at [phone].    My Clinical Pharmacist's contact information:                                                      Please feel free to contact me with any questions or concerns you have.      Barrington Ramos, PharmD  Medication Therapy Management (MTM) Pharmacist  [facility]      "

## 2024-07-01 NOTE — LETTER
July 1, 2024  Mary Kay Tejada  [address] [facility] [address]    Dear Ms. Tejada, [facility]     Thank you for talking with me on Jul 1, 2024 about your health and medications. As a follow-up to our conversation, I have included two documents:      Your Recommended To-Do List has steps you should take to get the best results from your medications.  Your Medication List will help you keep track of your medications and how to take them.    If you want to talk about these documents, please call Barrington Ramos PharmD at phone: [phone], Monday-Friday 8-4:30pm.    I look forward to working with you and your doctors to make sure your medications work well for you.    Sincerely,  Barrington Ramos PharmD  [facility] Pharmacist, [facility]

## 2024-07-01 NOTE — LETTER
"_  Medication List        Prepared on: Jul 1, 2024     Bring your Medication List when you go to the doctor, hospital, or   emergency room. And, share it with your family or caregivers.     Note any changes to how you take your medications.  Cross out medications when you no longer use them.    Medication How I take it Why I use it Prescriber   ACCU-CHEK SOFTCLIX LANCETS lancets [ACCU-CHEK SOFTCLIX LANCETS LANCETS] TEST THREE TIMES DAILY Type 2 diabetes mellitus with hyperglycemia (H) Cammy Bui MD   albuterol (PROAIR HFA/PROVENTIL HFA/VENTOLIN HFA) 108 (90 Base) MCG/ACT inhaler INHALE 2 PUFFS INTO THE LUNGS EVERY 4 HOURS AS NEEDED FOR WHEEZING Chronic obstructive pulmonary disease with acute lower respiratory infection (H) Cammy Bui MD   apixaban ANTICOAGULANT (ELIQUIS) 5 MG tablet Take 1 tablet (5 mg) by mouth 2 times daily Atrial fibrillation, unspecified type (H) Margy Rea PA-C   atorvastatin (LIPITOR) 20 MG tablet TAKE 1 TABLET(20 MG) BY MOUTH AT BEDTIME Mixed Hyperlipidemia Cammy Bui MD   BD ULTRA-FINE SHORT PEN NEEDLE 31 gauge x 5/16\" Ndle [BD ULTRA-FINE SHORT PEN NEEDLE 31 GAUGE X 5/16\" NDLE] TEST FOUR TIMES DAILY WITH MEALS AND AT BEDTIME DM (Diabetes Mellitus) (H) Cammy Bui MD   blood glucose (ONETOUCH VERIO IQ) test strip TEST THREE TIMES DAILY Type 2 diabetes mellitus with hypoglycemia without coma, without long-term current use of insulin (H) Cammy Bui MD   blood-glucose meter (ONETOUCH VERIO IQ METER) Misc [BLOOD-GLUCOSE METER (ONETOUCH VERIO IQ METER) MISC] Check blood sugar three times a day. Type 2 diabetes mellitus with hypoglycemia without coma, without long-term current use of insulin (H) Cammy Bui MD   budesonide-formoterol (SYMBICORT) 160-4.5 MCG/ACT Inhaler INHALE 2 PUFFS INTO THE LUNGS TWICE DAILY Pulmonary emphysema, unspecified emphysema type (H) Cammy ARAMBULA" MD Ramya   carbamide peroxide (DEBROX) 6.5 % otic solution Place 5 drops into both ears 2 times daily Impacted cerumen, bilateral Cammy Bui MD   diaper,brief,adult,disposable (ADULT BRIEFS - LARGE) Misc [DIAPER,BRIEF,ADULT,DISPOSABLE (ADULT BRIEFS - LARGE) MISC] Use 3-4 daily as needed for incontinence Mixed Stress and Urge Urinary Incontinence Cammy Biu MD   diltiazem ER COATED BEADS (CARDIZEM CD/CARTIA XT) 120 MG 24 hr capsule Take 120 mg by mouth daily  Atrial Fibrillation Cammy Bui MD   empagliflozin (JARDIANCE) 10 MG TABS tablet Take 1 tablet (10 mg) by mouth daily Acute on Chronic Diastolic Heart Failure (H); Type 2 diabetes mellitus with hypoglycemia without coma, without long-term current use of insulin (H) Cammy Bui MD   furosemide (LASIX) 20 MG tablet Take 2 tablets (40 mg) by mouth daily Acute on Chronic Diastolic Heart Failure (H) RAUDEL Fall CNP   gabapentin (NEURONTIN) 600 MG tablet TAKE 1 TABLET(600 MG) BY MOUTH THREE TIMES DAILY Type 2 diabetes mellitus with hypoglycemia without coma, with long-term current use of insulin (H) Cammy Bui MD   generic lancets (FINGERSTIX LANCETS) [GENERIC LANCETS (FINGERSTIX LANCETS)] Dispense brand per patient's insurance at pharmacy discretion. Type 2 diabetes mellitus with hyperglycemia, unspecified whether long term insulin use (H) Cammy Bui MD   ipratropium - albuterol 0.5 mg/2.5 mg/3 mL (DUONEB) 0.5-2.5 (3) MG/3ML neb solution INHALE 1 VIAL VIA NEBULIZER EVERY 6 HOURS AS NEEDED FOR SHORTNESS OF BREATH OR WHEEZING COPD Exacerbation (H) Cammy Bui MD   meclizine (ANTIVERT) 25 MG tablet TAKE 1 TABLET(25 MG) BY MOUTH THREE TIMES DAILY AS NEEDED FOR DIZZINESS OR NAUSEA Vertigo Cammy Bui MD   metoprolol tartrate (LOPRESSOR) 25 MG tablet Take 1 tablet (25 mg) by mouth 2 times daily  Complete Atrioventricular Block (H) Donnell Leon MD   naloxone (NARCAN) 4 MG/0.1ML nasal spray Spray 1 spray (4 mg) into one nostril alternating nostrils once as needed for opioid reversal every 2-3 minutes until assistance arrives Trigger point of thoracic region; Chronic Pain Syndrome Cammy Bui MD   Nutritional Supplements (ENSURE COMPLETE) LIQD Take 1 Can by mouth daily Severe Protein-Calorie Malnutrition (H) Cammy Bui MD   nystatin (NYSTOP) 092050 UNIT/GM external powder APPLY TO THE AFFECTED AREA(S) 2-3 TIMES DAILY AS NEEDED Candidal Intertrigo Cammy Bui MD   omeprazole (PRILOSEC) 20 MG DR capsule TAKE 1 CAPSULE(20 MG) BY MOUTH TWICE DAILY Type 2 diabetes mellitus with hypoglycemia without coma, without long-term current use of insulin (H) Cammy Bui MD   oxyCODONE IR (ROXICODONE) 10 MG tablet Take 1 tablet (10 mg) by mouth every 6 hours as needed for moderate to severe pain Chronic Pain Syndrome; Fibromyalgia Cammy Bui MD   potassium chloride ER (KLOR-CON M) 20 MEQ CR tablet TAKE 1 TABLET(20 MEQ) BY MOUTH DAILY Acute and chronic respiratory failure with hypercapnia (H) Cammy Bui MD   sucralfate (CARAFATE) 1 GM tablet CRUSH ONE TABLET AND MIX WITH A LLITTLE WATER AND SWALLOW FOUR TIMES DAILY Iron Deficiency Anemia due to Chronic Blood Loss Cammy Bui MD         Add new medications, over-the-counter drugs, herbals, vitamins, or  minerals in the blank rows below.    Medication How I take it Why I use it Prescriber                                      Allergies:      - Celebrex [celecoxib] - Rash  - Latex - Rash        Side effects I have had:      Not on File        Other Information:              My notes and questions:

## 2024-07-07 NOTE — TELEPHONE ENCOUNTER
Medication Question or Clarification  Who is calling: Patient  What medication are you calling about (include dose and sig)?:   sucralfate (CARAFATE) 1 gram tablet 120 tablet 12 10/19/2020     Sig: TAKE 1 TABLET BY MOUTH FOUR TIMES DAILY(CRUSH TABLET AND MIX IT WITH A LITTLE WATER, THEN SWALLOW)    Sent to pharmacy as: sucralfate 1 gram tablet (CARAFATE)    E-Prescribing Status: Receipt confirmed by pharmacy (10/19/2020 10:02 PM CDT)        Who prescribed the medication?: Cammy Bui MD    What is your question/concern?: I had dark stools for 3 days.  I have a Hx of a gastric ulcer and I do not want to go to the hospital.  Patient thinks the above medication is causing this but is also on coumadin.  Please return call ASAP and patient declined triage.   [facility]ed [facility]acy: [facility]  Okay to leave a detailed message?: Yes         Thank you for coming to our Emergency Department today! It is important to remember that some problems or medical conditions are difficult to diagnose and may not be found or addressed during your Emergency Department visit.     Be sure to follow up with your primary care doctor and review all labs/imaging/tests that were performed during your ER visit with them. Some labs/imaging/tests may be outside of the normal range, and require non-emergent follow-up and/or further investigation/treatment/procedures/testing to help diagnose/exclude/prevent complications or other potentially serious medical conditions that were not discussed or addressed during your ER visit.    Please take all medications as directed. All medications may potentially have side-effects and it is impossible to predict which medications may give you side-effects or what side-effects (if any) they will give you.. If you feel that you are having a negative effect or side-effect of any medication you should immediately stop taking them and seek medical attention. If you feel that you are having a life-threatening reaction call 911.    Return to the ER with any questions/concerns, new/concerning symptoms, worsening or failure to improve.     Do not drive, swim, climb to height, take a bath, operate heavy machinery, drink alcohol or take potentially sedating medications, sign any legal documents or make any important decisions for 24 hours if you have received any pain medications, sedatives or mood altering drugs during your ER visit or within 24 hours of taking them if they have been prescribed to you.

## 2024-07-11 DIAGNOSIS — J20.9 ACUTE BRONCHITIS, UNSPECIFIED ORGANISM: ICD-10-CM

## 2024-07-11 DIAGNOSIS — S81.802A OPEN WOUND OF LEFT LOWER EXTREMITY, INITIAL ENCOUNTER: ICD-10-CM

## 2024-07-11 RX ORDER — PREDNISONE 20 MG/1
TABLET ORAL
Qty: 10 TABLET | Refills: 0 | OUTPATIENT
Start: 2024-07-11

## 2024-07-11 RX ORDER — CEPHALEXIN 500 MG/1
CAPSULE ORAL
Qty: 20 CAPSULE | Refills: 0 | OUTPATIENT
Start: 2024-07-11

## 2024-07-11 RX ORDER — CODEINE PHOSPHATE AND GUAIFENESIN 10; 100 MG/5ML; MG/5ML
SOLUTION ORAL
Qty: 180 ML | OUTPATIENT
Start: 2024-07-11

## 2024-07-12 ENCOUNTER — TELEPHONE (OUTPATIENT)
Dept: FAMILY MEDICINE | Facility: CLINIC | Age: 74
End: 2024-07-12
Payer: COMMERCIAL

## 2024-07-12 ENCOUNTER — APPOINTMENT (OUTPATIENT)
Dept: CT IMAGING | Facility: CLINIC | Age: 74
DRG: 177 | End: 2024-07-12
Attending: EMERGENCY MEDICINE
Payer: COMMERCIAL

## 2024-07-12 ENCOUNTER — HOSPITAL ENCOUNTER (INPATIENT)
Facility: CLINIC | Age: 74
LOS: 5 days | Discharge: HOME | DRG: 177 | End: 2024-07-17
Attending: EMERGENCY MEDICINE | Admitting: INTERNAL MEDICINE
Payer: COMMERCIAL

## 2024-07-12 DIAGNOSIS — J44.1 COPD EXACERBATION (H): ICD-10-CM

## 2024-07-12 DIAGNOSIS — U07.1 PNEUMONIA DUE TO 2019 NOVEL CORONAVIRUS: ICD-10-CM

## 2024-07-12 DIAGNOSIS — J12.82 PNEUMONIA DUE TO 2019 NOVEL CORONAVIRUS: ICD-10-CM

## 2024-07-12 DIAGNOSIS — K59.00 CONSTIPATION, UNSPECIFIED CONSTIPATION TYPE: Primary | ICD-10-CM

## 2024-07-12 DIAGNOSIS — J44.9 CHRONIC OBSTRUCTIVE PULMONARY DISEASE, UNSPECIFIED COPD TYPE (H): ICD-10-CM

## 2024-07-12 DIAGNOSIS — J18.9 COMMUNITY ACQUIRED PNEUMONIA, UNSPECIFIED LATERALITY: ICD-10-CM

## 2024-07-12 DIAGNOSIS — I50.32 CHRONIC HEART FAILURE WITH PRESERVED EJECTION FRACTION (H): Primary | ICD-10-CM

## 2024-07-12 DIAGNOSIS — J96.21 ACUTE ON CHRONIC RESPIRATORY FAILURE WITH HYPOXEMIA (H): ICD-10-CM

## 2024-07-12 PROBLEM — I48.91 ATRIAL FIBRILLATION, UNSPECIFIED TYPE (H): Status: ACTIVE | Noted: 2021-09-27

## 2024-07-12 LAB
ALBUMIN SERPL BCG-MCNC: 3.8 G/DL (ref 3.5–5.2)
ALP SERPL-CCNC: 113 U/L (ref 40–150)
ALT SERPL W P-5'-P-CCNC: <5 U/L (ref 0–50)
ANION GAP SERPL CALCULATED.3IONS-SCNC: 5 MMOL/L (ref 7–15)
APTT PPP: 38 SECONDS (ref 22–38)
AST SERPL W P-5'-P-CCNC: 21 U/L (ref 0–45)
ATRIAL RATE - MUSE: 69 BPM
BASE EXCESS BLDV CALC-SCNC: 11.1 MMOL/L (ref -3–3)
BASOPHILS # BLD AUTO: 0 10E3/UL (ref 0–0.2)
BASOPHILS NFR BLD AUTO: 0 %
BILIRUB SERPL-MCNC: 0.4 MG/DL
BUN SERPL-MCNC: 18.9 MG/DL (ref 8–23)
CALCIUM SERPL-MCNC: 9.2 MG/DL (ref 8.8–10.2)
CHLORIDE SERPL-SCNC: 100 MMOL/L (ref 98–107)
CREAT SERPL-MCNC: 0.96 MG/DL (ref 0.51–0.95)
CRP SERPL-MCNC: 12.6 MG/L
DEPRECATED HCO3 PLAS-SCNC: 35 MMOL/L (ref 22–29)
DIASTOLIC BLOOD PRESSURE - MUSE: NORMAL MMHG
EGFRCR SERPLBLD CKD-EPI 2021: 62 ML/MIN/1.73M2
EOSINOPHIL # BLD AUTO: 0.1 10E3/UL (ref 0–0.7)
EOSINOPHIL NFR BLD AUTO: 2 %
ERYTHROCYTE [DISTWIDTH] IN BLOOD BY AUTOMATED COUNT: 15.9 % (ref 10–15)
GLUCOSE SERPL-MCNC: 188 MG/DL (ref 70–99)
HCO3 BLDV-SCNC: 37 MMOL/L (ref 21–28)
HCT VFR BLD AUTO: 39.4 % (ref 35–47)
HGB BLD-MCNC: 11.5 G/DL (ref 11.7–15.7)
IMM GRANULOCYTES # BLD: 0 10E3/UL
IMM GRANULOCYTES NFR BLD: 0 %
INR PPP: 1.45 (ref 0.85–1.15)
INTERPRETATION ECG - MUSE: NORMAL
LACTATE SERPL-SCNC: 0.9 MMOL/L (ref 0.7–2)
LYMPHOCYTES # BLD AUTO: 1 10E3/UL (ref 0.8–5.3)
LYMPHOCYTES NFR BLD AUTO: 21 %
MCH RBC QN AUTO: 24.9 PG (ref 26.5–33)
MCHC RBC AUTO-ENTMCNC: 29.2 G/DL (ref 31.5–36.5)
MCV RBC AUTO: 85 FL (ref 78–100)
MONOCYTES # BLD AUTO: 0.8 10E3/UL (ref 0–1.3)
MONOCYTES NFR BLD AUTO: 18 %
NEUTROPHILS # BLD AUTO: 2.8 10E3/UL (ref 1.6–8.3)
NEUTROPHILS NFR BLD AUTO: 59 %
NRBC # BLD AUTO: 0 10E3/UL
NRBC BLD AUTO-RTO: 0 /100
NT-PROBNP SERPL-MCNC: 804 PG/ML (ref 0–900)
O2/TOTAL GAS SETTING VFR VENT: 36 %
OXYHGB MFR BLDV: 20 % (ref 70–75)
P AXIS - MUSE: 67 DEGREES
PCO2 BLDV: 72 MM HG (ref 40–50)
PH BLDV: 7.32 [PH] (ref 7.32–7.43)
PLATELET # BLD AUTO: 125 10E3/UL (ref 150–450)
PO2 BLDV: 19 MM HG (ref 25–47)
POTASSIUM SERPL-SCNC: 4.1 MMOL/L (ref 3.4–5.3)
PR INTERVAL - MUSE: 166 MS
PROT SERPL-MCNC: 7.2 G/DL (ref 6.4–8.3)
QRS DURATION - MUSE: 102 MS
QT - MUSE: 406 MS
QTC - MUSE: 435 MS
R AXIS - MUSE: -72 DEGREES
RBC # BLD AUTO: 4.62 10E6/UL (ref 3.8–5.2)
SAO2 % BLDV: 20.1 % (ref 70–75)
SODIUM SERPL-SCNC: 140 MMOL/L (ref 135–145)
SYSTOLIC BLOOD PRESSURE - MUSE: NORMAL MMHG
T AXIS - MUSE: 71 DEGREES
TROPONIN T SERPL HS-MCNC: 33 NG/L
TROPONIN T SERPL HS-MCNC: 34 NG/L
VENTRICULAR RATE- MUSE: 69 BPM
WBC # BLD AUTO: 4.7 10E3/UL (ref 4–11)

## 2024-07-12 PROCEDURE — 93005 ELECTROCARDIOGRAM TRACING: CPT | Performed by: EMERGENCY MEDICINE

## 2024-07-12 PROCEDURE — 87641 MR-STAPH DNA AMP PROBE: CPT

## 2024-07-12 PROCEDURE — 258N000003 HC RX IP 258 OP 636: Performed by: EMERGENCY MEDICINE

## 2024-07-12 PROCEDURE — 94640 AIRWAY INHALATION TREATMENT: CPT | Mod: 76

## 2024-07-12 PROCEDURE — 250N000011 HC RX IP 250 OP 636: Performed by: EMERGENCY MEDICINE

## 2024-07-12 PROCEDURE — 83880 ASSAY OF NATRIURETIC PEPTIDE: CPT | Performed by: EMERGENCY MEDICINE

## 2024-07-12 PROCEDURE — 87635 SARS-COV-2 COVID-19 AMP PRB: CPT | Performed by: INTERNAL MEDICINE

## 2024-07-12 PROCEDURE — 94640 AIRWAY INHALATION TREATMENT: CPT

## 2024-07-12 PROCEDURE — 99223 1ST HOSP IP/OBS HIGH 75: CPT | Mod: FS

## 2024-07-12 PROCEDURE — 84100 ASSAY OF PHOSPHORUS: CPT

## 2024-07-12 PROCEDURE — 85610 PROTHROMBIN TIME: CPT | Performed by: EMERGENCY MEDICINE

## 2024-07-12 PROCEDURE — 36415 COLL VENOUS BLD VENIPUNCTURE: CPT | Performed by: EMERGENCY MEDICINE

## 2024-07-12 PROCEDURE — 71275 CT ANGIOGRAPHY CHEST: CPT

## 2024-07-12 PROCEDURE — 84145 PROCALCITONIN (PCT): CPT | Performed by: INTERNAL MEDICINE

## 2024-07-12 PROCEDURE — 99285 EMERGENCY DEPT VISIT HI MDM: CPT | Mod: 25

## 2024-07-12 PROCEDURE — 83735 ASSAY OF MAGNESIUM: CPT

## 2024-07-12 PROCEDURE — 84484 ASSAY OF TROPONIN QUANT: CPT | Performed by: EMERGENCY MEDICINE

## 2024-07-12 PROCEDURE — 250N000009 HC RX 250: Performed by: EMERGENCY MEDICINE

## 2024-07-12 PROCEDURE — 96375 TX/PRO/DX INJ NEW DRUG ADDON: CPT

## 2024-07-12 PROCEDURE — 82040 ASSAY OF SERUM ALBUMIN: CPT | Performed by: EMERGENCY MEDICINE

## 2024-07-12 PROCEDURE — 87640 STAPH A DNA AMP PROBE: CPT

## 2024-07-12 PROCEDURE — 83605 ASSAY OF LACTIC ACID: CPT | Performed by: EMERGENCY MEDICINE

## 2024-07-12 PROCEDURE — 999N000157 HC STATISTIC RCP TIME EA 10 MIN

## 2024-07-12 PROCEDURE — 87040 BLOOD CULTURE FOR BACTERIA: CPT | Performed by: EMERGENCY MEDICINE

## 2024-07-12 PROCEDURE — 86140 C-REACTIVE PROTEIN: CPT | Performed by: EMERGENCY MEDICINE

## 2024-07-12 PROCEDURE — 82805 BLOOD GASES W/O2 SATURATION: CPT | Performed by: EMERGENCY MEDICINE

## 2024-07-12 PROCEDURE — 85025 COMPLETE CBC W/AUTO DIFF WBC: CPT | Performed by: EMERGENCY MEDICINE

## 2024-07-12 PROCEDURE — 99418 PROLNG IP/OBS E/M EA 15 MIN: CPT | Mod: FS

## 2024-07-12 PROCEDURE — 120N000004 HC R&B MS OVERFLOW

## 2024-07-12 PROCEDURE — 96365 THER/PROPH/DIAG IV INF INIT: CPT | Mod: 59

## 2024-07-12 PROCEDURE — 85730 THROMBOPLASTIN TIME PARTIAL: CPT | Performed by: EMERGENCY MEDICINE

## 2024-07-12 PROCEDURE — 250N000009 HC RX 250

## 2024-07-12 RX ORDER — POTASSIUM CHLORIDE 1500 MG/1
20 TABLET, EXTENDED RELEASE ORAL DAILY
Status: DISCONTINUED | OUTPATIENT
Start: 2024-07-13 | End: 2024-07-14

## 2024-07-12 RX ORDER — IPRATROPIUM BROMIDE AND ALBUTEROL SULFATE 2.5; .5 MG/3ML; MG/3ML
3 SOLUTION RESPIRATORY (INHALATION) ONCE
Status: COMPLETED | OUTPATIENT
Start: 2024-07-12 | End: 2024-07-12

## 2024-07-12 RX ORDER — PIPERACILLIN SODIUM, TAZOBACTAM SODIUM 3; .375 G/15ML; G/15ML
3.38 INJECTION, POWDER, LYOPHILIZED, FOR SOLUTION INTRAVENOUS ONCE
Status: COMPLETED | OUTPATIENT
Start: 2024-07-12 | End: 2024-07-12

## 2024-07-12 RX ORDER — GABAPENTIN 600 MG/1
600 TABLET ORAL 3 TIMES DAILY
Status: DISCONTINUED | OUTPATIENT
Start: 2024-07-13 | End: 2024-07-17 | Stop reason: HOSPADM

## 2024-07-12 RX ORDER — OXYCODONE HYDROCHLORIDE 5 MG/1
10 TABLET ORAL EVERY 6 HOURS PRN
Status: DISCONTINUED | OUTPATIENT
Start: 2024-07-12 | End: 2024-07-17 | Stop reason: HOSPADM

## 2024-07-12 RX ORDER — ATORVASTATIN CALCIUM 10 MG/1
20 TABLET, FILM COATED ORAL AT BEDTIME
Status: DISCONTINUED | OUTPATIENT
Start: 2024-07-13 | End: 2024-07-17 | Stop reason: HOSPADM

## 2024-07-12 RX ORDER — PIPERACILLIN SODIUM, TAZOBACTAM SODIUM 3; .375 G/15ML; G/15ML
3.38 INJECTION, POWDER, LYOPHILIZED, FOR SOLUTION INTRAVENOUS EVERY 8 HOURS
Status: DISCONTINUED | OUTPATIENT
Start: 2024-07-13 | End: 2024-07-13

## 2024-07-12 RX ORDER — IPRATROPIUM BROMIDE AND ALBUTEROL SULFATE 2.5; .5 MG/3ML; MG/3ML
SOLUTION RESPIRATORY (INHALATION)
Qty: 90 ML | Refills: 0 | Status: SHIPPED | OUTPATIENT
Start: 2024-07-12

## 2024-07-12 RX ORDER — GUAIFENESIN 600 MG/1
600 TABLET, EXTENDED RELEASE ORAL 2 TIMES DAILY
Status: DISCONTINUED | OUTPATIENT
Start: 2024-07-12 | End: 2024-07-17 | Stop reason: HOSPADM

## 2024-07-12 RX ORDER — POLYETHYLENE GLYCOL 3350 17 G/17G
17 POWDER, FOR SOLUTION ORAL 2 TIMES DAILY PRN
Status: DISCONTINUED | OUTPATIENT
Start: 2024-07-12 | End: 2024-07-15

## 2024-07-12 RX ORDER — SUCRALFATE 1 G/1
1 TABLET ORAL
Status: DISCONTINUED | OUTPATIENT
Start: 2024-07-13 | End: 2024-07-17 | Stop reason: HOSPADM

## 2024-07-12 RX ORDER — ALBUTEROL SULFATE 90 UG/1
2 AEROSOL, METERED RESPIRATORY (INHALATION) EVERY 4 HOURS PRN
Status: DISCONTINUED | OUTPATIENT
Start: 2024-07-12 | End: 2024-07-17 | Stop reason: HOSPADM

## 2024-07-12 RX ORDER — ONDANSETRON 4 MG/1
4 TABLET, ORALLY DISINTEGRATING ORAL EVERY 6 HOURS PRN
Status: DISCONTINUED | OUTPATIENT
Start: 2024-07-12 | End: 2024-07-17 | Stop reason: HOSPADM

## 2024-07-12 RX ORDER — FUROSEMIDE 40 MG
40 TABLET ORAL DAILY
Status: DISCONTINUED | OUTPATIENT
Start: 2024-07-13 | End: 2024-07-17 | Stop reason: HOSPADM

## 2024-07-12 RX ORDER — DILTIAZEM HYDROCHLORIDE 120 MG/1
120 CAPSULE, COATED, EXTENDED RELEASE ORAL DAILY
Qty: 30 CAPSULE | Refills: 5 | Status: ON HOLD | OUTPATIENT
Start: 2024-07-12 | End: 2024-09-08

## 2024-07-12 RX ORDER — LIDOCAINE 40 MG/G
CREAM TOPICAL
Status: DISCONTINUED | OUTPATIENT
Start: 2024-07-12 | End: 2024-07-17 | Stop reason: HOSPADM

## 2024-07-12 RX ORDER — DEXAMETHASONE SODIUM PHOSPHATE 10 MG/ML
6 INJECTION, SOLUTION INTRAMUSCULAR; INTRAVENOUS DAILY
Status: DISCONTINUED | OUTPATIENT
Start: 2024-07-13 | End: 2024-07-12

## 2024-07-12 RX ORDER — ONDANSETRON 2 MG/ML
4 INJECTION INTRAMUSCULAR; INTRAVENOUS EVERY 6 HOURS PRN
Status: DISCONTINUED | OUTPATIENT
Start: 2024-07-12 | End: 2024-07-17 | Stop reason: HOSPADM

## 2024-07-12 RX ORDER — ACETAMINOPHEN 325 MG/1
650 TABLET ORAL EVERY 4 HOURS PRN
Status: DISCONTINUED | OUTPATIENT
Start: 2024-07-12 | End: 2024-07-17 | Stop reason: HOSPADM

## 2024-07-12 RX ORDER — AMOXICILLIN 250 MG
2 CAPSULE ORAL 2 TIMES DAILY PRN
Status: DISCONTINUED | OUTPATIENT
Start: 2024-07-12 | End: 2024-07-17 | Stop reason: HOSPADM

## 2024-07-12 RX ORDER — DEXAMETHASONE SODIUM PHOSPHATE 10 MG/ML
10 INJECTION, SOLUTION INTRAMUSCULAR; INTRAVENOUS ONCE
Status: COMPLETED | OUTPATIENT
Start: 2024-07-12 | End: 2024-07-12

## 2024-07-12 RX ORDER — CALCIUM CARBONATE 500 MG/1
1000 TABLET, CHEWABLE ORAL 4 TIMES DAILY PRN
Status: DISCONTINUED | OUTPATIENT
Start: 2024-07-12 | End: 2024-07-17 | Stop reason: HOSPADM

## 2024-07-12 RX ORDER — IOPAMIDOL 755 MG/ML
75 INJECTION, SOLUTION INTRAVASCULAR ONCE
Status: COMPLETED | OUTPATIENT
Start: 2024-07-12 | End: 2024-07-12

## 2024-07-12 RX ORDER — DEXTROSE MONOHYDRATE 25 G/50ML
25-50 INJECTION, SOLUTION INTRAVENOUS
Status: DISCONTINUED | OUTPATIENT
Start: 2024-07-12 | End: 2024-07-17 | Stop reason: HOSPADM

## 2024-07-12 RX ORDER — IPRATROPIUM BROMIDE AND ALBUTEROL SULFATE 2.5; .5 MG/3ML; MG/3ML
3 SOLUTION RESPIRATORY (INHALATION) EVERY 4 HOURS
Status: DISCONTINUED | OUTPATIENT
Start: 2024-07-12 | End: 2024-07-13

## 2024-07-12 RX ORDER — PANTOPRAZOLE SODIUM 40 MG/1
40 TABLET, DELAYED RELEASE ORAL 2 TIMES DAILY
Status: DISCONTINUED | OUTPATIENT
Start: 2024-07-13 | End: 2024-07-17 | Stop reason: HOSPADM

## 2024-07-12 RX ORDER — ONDANSETRON 2 MG/ML
4 INJECTION INTRAMUSCULAR; INTRAVENOUS ONCE
Status: COMPLETED | OUTPATIENT
Start: 2024-07-12 | End: 2024-07-12

## 2024-07-12 RX ORDER — METOPROLOL TARTRATE 25 MG/1
25 TABLET, FILM COATED ORAL 2 TIMES DAILY
Status: DISCONTINUED | OUTPATIENT
Start: 2024-07-13 | End: 2024-07-17 | Stop reason: HOSPADM

## 2024-07-12 RX ORDER — DILTIAZEM HYDROCHLORIDE 120 MG/1
120 CAPSULE, COATED, EXTENDED RELEASE ORAL DAILY
Status: DISCONTINUED | OUTPATIENT
Start: 2024-07-13 | End: 2024-07-17 | Stop reason: HOSPADM

## 2024-07-12 RX ORDER — AMOXICILLIN 250 MG
1 CAPSULE ORAL 2 TIMES DAILY PRN
Status: DISCONTINUED | OUTPATIENT
Start: 2024-07-12 | End: 2024-07-17 | Stop reason: HOSPADM

## 2024-07-12 RX ORDER — ACETAMINOPHEN 650 MG/1
650 SUPPOSITORY RECTAL EVERY 4 HOURS PRN
Status: DISCONTINUED | OUTPATIENT
Start: 2024-07-12 | End: 2024-07-17 | Stop reason: HOSPADM

## 2024-07-12 RX ORDER — NICOTINE POLACRILEX 4 MG
15-30 LOZENGE BUCCAL
Status: DISCONTINUED | OUTPATIENT
Start: 2024-07-12 | End: 2024-07-17 | Stop reason: HOSPADM

## 2024-07-12 RX ORDER — VANCOMYCIN HYDROCHLORIDE
1250 ONCE
Status: COMPLETED | OUTPATIENT
Start: 2024-07-12 | End: 2024-07-13

## 2024-07-12 RX ADMIN — IOPAMIDOL 75 ML: 755 INJECTION, SOLUTION INTRAVENOUS at 21:21

## 2024-07-12 RX ADMIN — PIPERACILLIN AND TAZOBACTAM 3.38 G: 3; .375 INJECTION, POWDER, FOR SOLUTION INTRAVENOUS at 22:44

## 2024-07-12 RX ADMIN — VANCOMYCIN HYDROCHLORIDE 1250 MG: 5 INJECTION, POWDER, LYOPHILIZED, FOR SOLUTION INTRAVENOUS at 23:21

## 2024-07-12 RX ADMIN — IPRATROPIUM BROMIDE AND ALBUTEROL SULFATE 3 ML: .5; 3 SOLUTION RESPIRATORY (INHALATION) at 20:09

## 2024-07-12 RX ADMIN — DEXAMETHASONE SODIUM PHOSPHATE 10 MG: 10 INJECTION INTRAMUSCULAR; INTRAVENOUS at 20:37

## 2024-07-12 RX ADMIN — IPRATROPIUM BROMIDE AND ALBUTEROL SULFATE 3 ML: .5; 3 SOLUTION RESPIRATORY (INHALATION) at 23:39

## 2024-07-12 RX ADMIN — ONDANSETRON 4 MG: 2 INJECTION INTRAMUSCULAR; INTRAVENOUS at 21:41

## 2024-07-12 ASSESSMENT — ENCOUNTER SYMPTOMS
SHORTNESS OF BREATH: 1
FEVER: 0
ORTHOPNEA: 0
WHEEZING: 0
FLANK PAIN: 0
FREQUENCY: 0
DIZZINESS: 0
PALPITATIONS: 0
WEIGHT LOSS: 0
COUGH: 0
TINGLING: 0
SEIZURES: 0
CHILLS: 0
HEARTBURN: 0
DEPRESSION: 0
ABDOMINAL PAIN: 0
NAUSEA: 0
BLURRED VISION: 0
WEAKNESS: 0
VOMITING: 0

## 2024-07-12 ASSESSMENT — COLUMBIA-SUICIDE SEVERITY RATING SCALE - C-SSRS
6. HAVE YOU EVER DONE ANYTHING, STARTED TO DO ANYTHING, OR PREPARED TO DO ANYTHING TO END YOUR LIFE?: NO
1. IN THE PAST MONTH, HAVE YOU WISHED YOU WERE DEAD OR WISHED YOU COULD GO TO SLEEP AND NOT WAKE UP?: NO
2. HAVE YOU ACTUALLY HAD ANY THOUGHTS OF KILLING YOURSELF IN THE PAST MONTH?: NO

## 2024-07-12 ASSESSMENT — ACTIVITIES OF DAILY LIVING (ADL)
ADLS_ACUITY_SCORE: 37
ADLS_ACUITY_SCORE: 35
ADLS_ACUITY_SCORE: 37
ADLS_ACUITY_SCORE: 37

## 2024-07-12 NOTE — TELEPHONE ENCOUNTER
Patient called regarding why her requested medications were denied.   Writer informed patient that those three medications require a visit before being prescribed.     Advised patient to schedule an appointment or go into urgent care. Patient only willing to see Dr. Brizuela. There are no openings until Tuesday, July 16. Patient only wanted to be seen virtually.   Stressed to patient that if she gets worse with upper/lower respiratory symptoms she needs to be seen in urgent care.   Patient stated understanding.     Daksha Nowak, MELISSAN RN  [facility]

## 2024-07-12 NOTE — TELEPHONE ENCOUNTER
Son calling in regarding patient (CTC on file).    Hx COPD and today positive for COVID   With increased work of breathing  6 wk ago heart value   Son is very concerned regarding the last time she had COVID she ended up in the ICU and wants to be proactive.     Informed that writer is absolutely willing to triage patient and consider treatment options but at this point she is high risk and even after if there is any additional worry/concern the recommendation would be to go to the ED.      Son thanked writer and stated he will take her to the ED.     Katiana Sahu RN  [facility]

## 2024-07-12 NOTE — TELEPHONE ENCOUNTER
"Patient called for an update. Writer informed patient that medication was denied. Patient wanted reasoning behind why meds were not refilled. Per patient, \"Dr. Brizuela prescribes me these medications all the time when I need them.\" Sent patient to RN to advise.   "

## 2024-07-13 ENCOUNTER — APPOINTMENT (OUTPATIENT)
Dept: RADIOLOGY | Facility: CLINIC | Age: 74
DRG: 177 | End: 2024-07-13
Attending: HOSPITALIST
Payer: COMMERCIAL

## 2024-07-13 ENCOUNTER — APPOINTMENT (OUTPATIENT)
Dept: PHYSICAL THERAPY | Facility: CLINIC | Age: 74
DRG: 177 | End: 2024-07-13
Payer: COMMERCIAL

## 2024-07-13 LAB
ALBUMIN SERPL BCG-MCNC: 3.5 G/DL (ref 3.5–5.2)
ALBUMIN UR-MCNC: NEGATIVE MG/DL
ALP SERPL-CCNC: 101 U/L (ref 40–150)
ALT SERPL W P-5'-P-CCNC: <5 U/L (ref 0–50)
ANION GAP SERPL CALCULATED.3IONS-SCNC: 7 MMOL/L (ref 7–15)
APPEARANCE UR: CLEAR
AST SERPL W P-5'-P-CCNC: 22 U/L (ref 0–45)
BASE EXCESS BLDV CALC-SCNC: 6.4 MMOL/L (ref -3–3)
BASOPHILS # BLD AUTO: 0 10E3/UL (ref 0–0.2)
BASOPHILS NFR BLD AUTO: 0 %
BILIRUB SERPL-MCNC: 0.3 MG/DL
BILIRUB UR QL STRIP: NEGATIVE
BUN SERPL-MCNC: 19.4 MG/DL (ref 8–23)
CALCIUM SERPL-MCNC: 8.8 MG/DL (ref 8.8–10.2)
CHLORIDE SERPL-SCNC: 102 MMOL/L (ref 98–107)
COLOR UR AUTO: ABNORMAL
CREAT SERPL-MCNC: 0.87 MG/DL (ref 0.51–0.95)
CRP SERPL-MCNC: 14.6 MG/L
DEPRECATED HCO3 PLAS-SCNC: 32 MMOL/L (ref 22–29)
EGFRCR SERPLBLD CKD-EPI 2021: 70 ML/MIN/1.73M2
EOSINOPHIL # BLD AUTO: 0 10E3/UL (ref 0–0.7)
EOSINOPHIL NFR BLD AUTO: 0 %
ERYTHROCYTE [DISTWIDTH] IN BLOOD BY AUTOMATED COUNT: 15.7 % (ref 10–15)
GLUCOSE BLDC GLUCOMTR-MCNC: 120 MG/DL (ref 70–99)
GLUCOSE BLDC GLUCOMTR-MCNC: 133 MG/DL (ref 70–99)
GLUCOSE BLDC GLUCOMTR-MCNC: 173 MG/DL (ref 70–99)
GLUCOSE BLDC GLUCOMTR-MCNC: 190 MG/DL (ref 70–99)
GLUCOSE BLDC GLUCOMTR-MCNC: 280 MG/DL (ref 70–99)
GLUCOSE SERPL-MCNC: 161 MG/DL (ref 70–99)
GLUCOSE UR STRIP-MCNC: >1000 MG/DL
HCO3 BLDV-SCNC: 33 MMOL/L (ref 21–28)
HCT VFR BLD AUTO: 37.5 % (ref 35–47)
HGB BLD-MCNC: 10.9 G/DL (ref 11.7–15.7)
HGB UR QL STRIP: NEGATIVE
IMM GRANULOCYTES # BLD: 0 10E3/UL
IMM GRANULOCYTES NFR BLD: 1 %
INR PPP: 1.27 (ref 0.85–1.15)
KETONES UR STRIP-MCNC: NEGATIVE MG/DL
LEUKOCYTE ESTERASE UR QL STRIP: NEGATIVE
LYMPHOCYTES # BLD AUTO: 0.6 10E3/UL (ref 0.8–5.3)
LYMPHOCYTES NFR BLD AUTO: 19 %
MAGNESIUM SERPL-MCNC: 2.1 MG/DL (ref 1.7–2.3)
MAGNESIUM SERPL-MCNC: 2.3 MG/DL (ref 1.7–2.3)
MCH RBC QN AUTO: 24.8 PG (ref 26.5–33)
MCHC RBC AUTO-ENTMCNC: 29.1 G/DL (ref 31.5–36.5)
MCV RBC AUTO: 85 FL (ref 78–100)
MONOCYTES # BLD AUTO: 0.1 10E3/UL (ref 0–1.3)
MONOCYTES NFR BLD AUTO: 3 %
MRSA DNA SPEC QL NAA+PROBE: NEGATIVE
MUCOUS THREADS #/AREA URNS LPF: PRESENT /LPF
NEUTROPHILS # BLD AUTO: 2.3 10E3/UL (ref 1.6–8.3)
NEUTROPHILS NFR BLD AUTO: 76 %
NITRATE UR QL: NEGATIVE
NRBC # BLD AUTO: 0 10E3/UL
NRBC BLD AUTO-RTO: 0 /100
NT-PROBNP SERPL-MCNC: 698 PG/ML (ref 0–900)
O2/TOTAL GAS SETTING VFR VENT: 0 %
OXYHGB MFR BLDV: 86 % (ref 70–75)
PCO2 BLDV: 67 MM HG (ref 40–50)
PH BLDV: 7.3 [PH] (ref 7.32–7.43)
PH UR STRIP: 5.5 [PH] (ref 5–7)
PHOSPHATE SERPL-MCNC: 3.7 MG/DL (ref 2.5–4.5)
PHOSPHATE SERPL-MCNC: 5.3 MG/DL (ref 2.5–4.5)
PLATELET # BLD AUTO: 107 10E3/UL (ref 150–450)
PO2 BLDV: 62 MM HG (ref 25–47)
POTASSIUM SERPL-SCNC: 4.6 MMOL/L (ref 3.4–5.3)
PROCALCITONIN SERPL IA-MCNC: 0.06 NG/ML
PROT SERPL-MCNC: 6.7 G/DL (ref 6.4–8.3)
RBC # BLD AUTO: 4.4 10E6/UL (ref 3.8–5.2)
RBC URINE: 3 /HPF
SA TARGET DNA: NEGATIVE
SAO2 % BLDV: 87.8 % (ref 70–75)
SARS-COV-2 RNA RESP QL NAA+PROBE: POSITIVE
SODIUM SERPL-SCNC: 141 MMOL/L (ref 135–145)
SP GR UR STRIP: <1.005 (ref 1–1.03)
SQUAMOUS EPITHELIAL: 3 /HPF
UROBILINOGEN UR STRIP-MCNC: <2 MG/DL
WBC # BLD AUTO: 3 10E3/UL (ref 4–11)
WBC URINE: 1 /HPF

## 2024-07-13 PROCEDURE — 83735 ASSAY OF MAGNESIUM: CPT

## 2024-07-13 PROCEDURE — 99233 SBSQ HOSP IP/OBS HIGH 50: CPT | Performed by: HOSPITALIST

## 2024-07-13 PROCEDURE — 272N000735 HC KIT, CIRCUIT WITH NEB, VOLERA

## 2024-07-13 PROCEDURE — 250N000012 HC RX MED GY IP 250 OP 636 PS 637: Performed by: HOSPITALIST

## 2024-07-13 PROCEDURE — 97530 THERAPEUTIC ACTIVITIES: CPT | Mod: GP

## 2024-07-13 PROCEDURE — 999N000157 HC STATISTIC RCP TIME EA 10 MIN

## 2024-07-13 PROCEDURE — 36415 COLL VENOUS BLD VENIPUNCTURE: CPT | Performed by: INTERNAL MEDICINE

## 2024-07-13 PROCEDURE — 80053 COMPREHEN METABOLIC PANEL: CPT

## 2024-07-13 PROCEDURE — 94640 AIRWAY INHALATION TREATMENT: CPT | Mod: 76

## 2024-07-13 PROCEDURE — 999N000215 HC STATISTIC HFNC ADULT NON-CPAP

## 2024-07-13 PROCEDURE — 82805 BLOOD GASES W/O2 SATURATION: CPT | Performed by: INTERNAL MEDICINE

## 2024-07-13 PROCEDURE — 87205 SMEAR GRAM STAIN: CPT

## 2024-07-13 PROCEDURE — 200N000001 HC R&B ICU

## 2024-07-13 PROCEDURE — 250N000009 HC RX 250: Performed by: INTERNAL MEDICINE

## 2024-07-13 PROCEDURE — 84100 ASSAY OF PHOSPHORUS: CPT

## 2024-07-13 PROCEDURE — 85025 COMPLETE CBC W/AUTO DIFF WBC: CPT

## 2024-07-13 PROCEDURE — 83880 ASSAY OF NATRIURETIC PEPTIDE: CPT | Performed by: INTERNAL MEDICINE

## 2024-07-13 PROCEDURE — 272N000272 HC CONTINUOUS NEBULIZER MICRO PUMP

## 2024-07-13 PROCEDURE — 258N000003 HC RX IP 258 OP 636

## 2024-07-13 PROCEDURE — 250N000011 HC RX IP 250 OP 636

## 2024-07-13 PROCEDURE — 71045 X-RAY EXAM CHEST 1 VIEW: CPT

## 2024-07-13 PROCEDURE — 94799 UNLISTED PULMONARY SVC/PX: CPT

## 2024-07-13 PROCEDURE — 85610 PROTHROMBIN TIME: CPT

## 2024-07-13 PROCEDURE — 36415 COLL VENOUS BLD VENIPUNCTURE: CPT

## 2024-07-13 PROCEDURE — 81001 URINALYSIS AUTO W/SCOPE: CPT | Performed by: EMERGENCY MEDICINE

## 2024-07-13 PROCEDURE — 250N000013 HC RX MED GY IP 250 OP 250 PS 637

## 2024-07-13 PROCEDURE — 86140 C-REACTIVE PROTEIN: CPT

## 2024-07-13 PROCEDURE — 999N000287 HC ICU ADULT ROUNDING, EACH 10 MINS

## 2024-07-13 PROCEDURE — 99291 CRITICAL CARE FIRST HOUR: CPT | Performed by: INTERNAL MEDICINE

## 2024-07-13 PROCEDURE — 97162 PT EVAL MOD COMPLEX 30 MIN: CPT | Mod: GP

## 2024-07-13 PROCEDURE — 94660 CPAP INITIATION&MGMT: CPT

## 2024-07-13 PROCEDURE — 272N000557 HC SENSOR NIRS OXIMETER, INFANT NON-ADHESIVE

## 2024-07-13 PROCEDURE — 250N000012 HC RX MED GY IP 250 OP 636 PS 637

## 2024-07-13 PROCEDURE — 272N000054 HC CANNULA HIGH FLOW, ADULT

## 2024-07-13 RX ORDER — ACETYLCYSTEINE 200 MG/ML
2 SOLUTION ORAL; RESPIRATORY (INHALATION) 4 TIMES DAILY
Status: DISCONTINUED | OUTPATIENT
Start: 2024-07-13 | End: 2024-07-17 | Stop reason: HOSPADM

## 2024-07-13 RX ORDER — IPRATROPIUM BROMIDE AND ALBUTEROL SULFATE 2.5; .5 MG/3ML; MG/3ML
3 SOLUTION RESPIRATORY (INHALATION)
Status: DISCONTINUED | OUTPATIENT
Start: 2024-07-13 | End: 2024-07-17 | Stop reason: HOSPADM

## 2024-07-13 RX ORDER — IPRATROPIUM BROMIDE AND ALBUTEROL SULFATE 2.5; .5 MG/3ML; MG/3ML
3 SOLUTION RESPIRATORY (INHALATION) EVERY 4 HOURS PRN
Status: DISCONTINUED | OUTPATIENT
Start: 2024-07-13 | End: 2024-07-17 | Stop reason: HOSPADM

## 2024-07-13 RX ADMIN — IPRATROPIUM BROMIDE AND ALBUTEROL SULFATE 3 ML: .5; 3 SOLUTION RESPIRATORY (INHALATION) at 12:38

## 2024-07-13 RX ADMIN — FUROSEMIDE 40 MG: 40 TABLET ORAL at 09:07

## 2024-07-13 RX ADMIN — IPRATROPIUM BROMIDE AND ALBUTEROL SULFATE 3 ML: .5; 3 SOLUTION RESPIRATORY (INHALATION) at 19:31

## 2024-07-13 RX ADMIN — GABAPENTIN 600 MG: 600 TABLET, FILM COATED ORAL at 09:06

## 2024-07-13 RX ADMIN — METOPROLOL TARTRATE 25 MG: 25 TABLET, FILM COATED ORAL at 22:20

## 2024-07-13 RX ADMIN — GUAIFENESIN 600 MG: 600 TABLET ORAL at 01:52

## 2024-07-13 RX ADMIN — PIPERACILLIN AND TAZOBACTAM 3.38 G: 3; .375 INJECTION, POWDER, FOR SOLUTION INTRAVENOUS at 06:04

## 2024-07-13 RX ADMIN — ACETYLCYSTEINE 2 ML: 200 SOLUTION ORAL; RESPIRATORY (INHALATION) at 19:31

## 2024-07-13 RX ADMIN — REMDESIVIR 200 MG: 100 INJECTION, POWDER, LYOPHILIZED, FOR SOLUTION INTRAVENOUS at 01:40

## 2024-07-13 RX ADMIN — SUCRALFATE 1 G: 1 TABLET ORAL at 13:18

## 2024-07-13 RX ADMIN — ATORVASTATIN CALCIUM 20 MG: 10 TABLET, FILM COATED ORAL at 22:19

## 2024-07-13 RX ADMIN — IPRATROPIUM BROMIDE AND ALBUTEROL SULFATE 3 ML: .5; 3 SOLUTION RESPIRATORY (INHALATION) at 15:27

## 2024-07-13 RX ADMIN — SODIUM CHLORIDE 50 ML: 9 INJECTION, SOLUTION INTRAVENOUS at 04:15

## 2024-07-13 RX ADMIN — GUAIFENESIN 600 MG: 600 TABLET ORAL at 09:07

## 2024-07-13 RX ADMIN — ACETYLCYSTEINE 2 ML: 200 SOLUTION ORAL; RESPIRATORY (INHALATION) at 12:38

## 2024-07-13 RX ADMIN — GABAPENTIN 600 MG: 600 TABLET, FILM COATED ORAL at 22:19

## 2024-07-13 RX ADMIN — PANTOPRAZOLE SODIUM 40 MG: 40 TABLET, DELAYED RELEASE ORAL at 09:07

## 2024-07-13 RX ADMIN — APIXABAN 5 MG: 5 TABLET, FILM COATED ORAL at 09:07

## 2024-07-13 RX ADMIN — SUCRALFATE 1 G: 1 TABLET ORAL at 22:18

## 2024-07-13 RX ADMIN — POTASSIUM CHLORIDE 20 MEQ: 1500 TABLET, EXTENDED RELEASE ORAL at 09:11

## 2024-07-13 RX ADMIN — PANTOPRAZOLE SODIUM 40 MG: 40 TABLET, DELAYED RELEASE ORAL at 22:19

## 2024-07-13 RX ADMIN — APIXABAN 5 MG: 5 TABLET, FILM COATED ORAL at 22:18

## 2024-07-13 RX ADMIN — INSULIN ASPART 2 UNITS: 100 INJECTION, SOLUTION INTRAVENOUS; SUBCUTANEOUS at 22:18

## 2024-07-13 RX ADMIN — INSULIN ASPART 1 UNITS: 100 INJECTION, SOLUTION INTRAVENOUS; SUBCUTANEOUS at 17:21

## 2024-07-13 RX ADMIN — DEXAMETHASONE 6 MG: 2 TABLET ORAL at 13:19

## 2024-07-13 RX ADMIN — GABAPENTIN 600 MG: 600 TABLET, FILM COATED ORAL at 13:19

## 2024-07-13 RX ADMIN — SUCRALFATE 1 G: 1 TABLET ORAL at 09:07

## 2024-07-13 RX ADMIN — GUAIFENESIN 600 MG: 600 TABLET ORAL at 22:19

## 2024-07-13 RX ADMIN — SUCRALFATE 1 G: 1 TABLET ORAL at 17:20

## 2024-07-13 RX ADMIN — ACETYLCYSTEINE 2 ML: 200 SOLUTION ORAL; RESPIRATORY (INHALATION) at 15:27

## 2024-07-13 ASSESSMENT — ACTIVITIES OF DAILY LIVING (ADL)
ADLS_ACUITY_SCORE: 26
ADLS_ACUITY_SCORE: 26
ADLS_ACUITY_SCORE: 25
ADLS_ACUITY_SCORE: 26
ADLS_ACUITY_SCORE: 37
ADLS_ACUITY_SCORE: 26
ADLS_ACUITY_SCORE: 26
ADLS_ACUITY_SCORE: 29
ADLS_ACUITY_SCORE: 26
ADLS_ACUITY_SCORE: 29
ADLS_ACUITY_SCORE: 26
ADLS_ACUITY_SCORE: 37
ADLS_ACUITY_SCORE: 26
ADLS_ACUITY_SCORE: 26
ADLS_ACUITY_SCORE: 29
ADLS_ACUITY_SCORE: 29
ADLS_ACUITY_SCORE: 26
DEPENDENT_IADLS:: CLEANING;COOKING;LAUNDRY;SHOPPING;MEAL PREPARATION;MEDICATION MANAGEMENT;MONEY MANAGEMENT;TRANSPORTATION
ADLS_ACUITY_SCORE: 26

## 2024-07-13 NOTE — ED PROVIDER NOTES
EMERGENCY DEPARTMENT ENCOUNTER      NAME: Mary Kay Tejada  AGE: 74 year old female  YOB: 1950  MRN: [id number]  EVALUATION DATE & TIME: No admission date for patient encounter.    PCP: Cammy Bui    ED PROVIDER: Ernestine Sweeney MD      Chief Complaint   Patient presents with    Shortness of Breath         FINAL IMPRESSION:  1. Pneumonia due to 2019 novel coronavirus    2. Acute on chronic respiratory failure with hypoxemia (H)          ED COURSE & MEDICAL DECISION MAKING:    Pertinent Labs & Imaging studies reviewed. (See chart for details)    8:51 PM I introduced myself to the patient, obtained patient history, performed a physical exam, and discussed plan for ED workup including potential diagnostic laboratory/imaging studies and interventions.  10:58 PM I spoke to Hospitalist.     74 year old female with a pertinent history of vulvar cancer, DM2, COPD, HTN, HLD, fibromyalgia, chronic CHF, and chronic anemia who presents to this ED for evaluation of shortness of breath.  Patient tested positive with COVID antigen test today.  Has had worsening shortness of breath.  Typically on 3 L nasal cannula now requiring 6 L here in the ER.  She however does not have any signs of obvious significant respiratory distress.  She speaking in full sentences on 6 L.  Slightly diminished breath sounds bilaterally but no obvious wheezing or crackles.  With the COVID-19 infection did have concern for the potential of PE as well as pneumonia, pneumothorax, pulmonary edema, etc.  Obtain CT of the chest with contrast for further evaluation.  VBG obtained which reveals pH of 7.32 with a pCO2 of 72 and a bicarb of 37.  She appears compensated and thus did not feel that she required BiPAP at this time.  Patient was given a DuoNeb here as well as 10 mg of IV Decadron with her positive COVID status.  Also given 4 mg of IV Zofran.  Lactic acid within normal limits.  White blood cell count normal at 4.7.   Hemoglobin 11.5.  CMP largely unrevealing other than a bicarb of 35 which is likely chronic.  .    Troponin 34 and on repeat 33.  EKG without signs of acute ischemia.  CT reveals no evidence of PE.  Interval complete right upper lobe collapse with opacification of the proximal right upper lobe airway of indeterminate etiology.  I did review this and there is no sign of a pneumothorax.  Concern for potential obstructing lesion or retained airway debris.  No significant change of moderate right pleural effusion of indeterminate etiology.  Son does confirm that she has had this in the past with thoracentesis previously without known etiology.  Moderately advanced emphysema.  Did add on IV Zosyn.  Blood cultures obtained.  Patient and son in agreement with admission.  Spoke with the hospitalist team accepted the patient for admission.  She was admitted in stable condition.         At the conclusion of the encounter I discussed the results of all of the tests and the disposition. The questions were answered. The patient or family acknowledged understanding and was agreeable with the care plan.         Medical Decision Making  Obtained supplemental history:Supplemental history obtained?: Documented in chart and Family Member/Significant Other  Reviewed external records: External records reviewed?: Outpatient Record: [facility] on 7/1/24  Care impacted by chronic illness:Cancer/Chemotherapy, Chronic Lung Disease, Chronic Pain, Diabetes, Hyperlipidemia, Hypertension, Mental Health, and Other: chronic CHF, GERD, atrial fibrillation, chronic constipation, chronic anemia  Care significantly affected by social determinants of health:N/A  Did you consider but not order tests?: Work up considered but not performed and documented in chart, if applicable  Did you interpret images independently?: Independent interpretation of ECG and images noted in documentation, when applicable.  Consultation discussion  with other provider:Did you involve another provider (consultant, , pharmacy, etc.)?: I discussed the care with another health care provider, see documentation for details.  Admit.      MEDICATIONS GIVEN IN THE EMERGENCY:  Medications - No data to display    NEW PRESCRIPTIONS STARTED AT TODAY'S ER VISIT  New Prescriptions    No medications on file          =================================================================    HPI    Patient information was obtained from: Patient    Use of : N/A         Mary Kay Tejada is a 74 year old female with a pertinent history of vulvar cancer, DM2, COPD, HTN, HLD, fibromyalgia, chronic CHF, and chronic anemia who presents to this ED for evaluation of shortness of breath.     Patient reports she has had a cough for 2 days and tested herself at home with a COVID antigen test that was positive today.  She reports worsening shortness of breath today.  Is typically on 3 L nasal cannula chronically.  She was more short of breath with this today and son convinced her to come here for evaluation.  She is now necessitating 6 L nasal cannula oxygen here.  Reports mild production to the cough.  Denies any chest pain.  No abdominal pain.  States she feels nauseous but no vomiting or diarrhea.  No leg swelling.  No documented fever.  Is taking all of her home medications as prescribed.  Previously had COVID in the past and was in the ICU at that time.  Is taking her Lasix as prescribed.  She still smokes tobacco.    Per chart review: patient was seen at [facility] on 7/1/24 for fibromyalgia. Last A1C at goal less than 7%, HLD at goal with LDL less than 100, no shortness of breath concerns or chronic pain concerns at time. She uses albuterol if needed, and Symbicort daily.     REVIEW OF SYSTEMS   Pertinent positives and negatives are documented in the HPI. All other systems reviewed and are negative.      PAST MEDICAL HISTORY:  Past Medical History:    Diagnosis Date    Anemia     Aortic stenosis     Aortic valve disorder     Atrial fibrillation (H)     Atrial flutter (H)     Benign neoplasm of adenomatous polyp     large intestine     Chronic constipation     Chronic heart failure with preserved ejection fraction (H) 02/29/2024    Chronic pain syndrome     Congestive heart failure (H)     COPD (chronic obstructive pulmonary disease) (H)     Oxygen at night     Dependence on supplemental oxygen     Oxygen at noc, during the day as needed    Depression     Diabetes mellitus (H)     Dry eye syndrome     Fibromyalgia     Ganglion     left wrist    GERD (gastroesophageal reflux disease)     Hyperlipidemia     Hypertension     Hypokalemia     Infective otitis externa, unspecified     Created by Conversion     Larynx edema     Lung disease     Malignant neoplasm of vulva (H)     Created by Conversion [facility] Annotation: Apr 17 2007  8:24AM - Cammy Bui:  resection per Dr. Alfonso Mane 9/06;  Replacement Utility updated for latest IMO load    Medial epicondylitis     Onychomycosis     Osteoarthritis     Peptic ulcer     Polyneuropathy     Vulvar malignant neoplasm (H)        PAST SURGICAL HISTORY:  Past Surgical History:   Procedure Laterality Date    BIOPSY BREAST Right     BIOPSY BREAST Right 01/28/2015    BIOPSY BREAST Right 01/28/2015    Procedure: RIGHT BREAST BIOPSY AFTER WIRE LOCALIZATION AT 0940;  Surgeon: Renée Soriano MD;  Location: [facility];  Service:     BIOPSY OF BREAST, INCISIONAL      Description: Incisional Breast Biopsy;  Recorded: 11/13/2007;  Comments: benign    COLONOSCOPY N/A 06/14/2019    Procedure: COLONOSCOPY;  Surgeon: Eduardo Mora MD;  Location: [facility];  Service: Gastroenterology    CV CORONARY ANGIOGRAM N/A 02/08/2024    Procedure: CV CORONARY ANGIOGRAM;  Surgeon: Moises Valencia MD;  Location: [facility] CATH LAB CV    CV LEFT HEART CATH N/A 02/08/2024    Procedure: Left Heart Catheterization;   Surgeon: Moises Valencia MD;  Location: [facility]    CV RIGHT HEART CATH MEASUREMENTS RECORDED N/A 02/08/2024    Procedure: Right Heart Catheterization;  Surgeon: Moises Valencia MD;  Location: [facility] CV    CV TRANSCATHETER AORTIC VALVE REPLACEMENT-FEMORAL APPROACH N/A 02/20/2024    Procedure: Transcatheter Aortic Valve Replacement, possible cardiopulmonary bypass, possible surgical intervention;  Surgeon: Moises Valencia MD;  Location: [facility] CV    EP PACEMAKER DEVICE & IMPLANT- HIS LEAD DUAL N/A 3/7/2024    Procedure: Pacemaker Device & Lead Implant - HIS Lead Dual;  Surgeon: Donnell Leon MD;  Location: [facility] CV    ESOPHAGOSCOPY, GASTROSCOPY, DUODENOSCOPY (EGD), COMBINED N/A 11/06/2018    Procedure: ESOPHAGOGASTRODUODENOSCOPY;  Surgeon: Lit Fernando MD;  Location: [facility];  Service:     HYSTERECTOMY      JOINT REPLACEMENT Left     TKA    OR TRANSCATHETER AORTIC VALVE REPLACEMENT, FEMORAL PERCUTANEOUS APPROACH (STANDBY) N/A 02/20/2024    Procedure: OR TRANSCATHETER AORTIC VALVE REPLACEMENT, FEMORAL PERCUTANEOUS APPROACH (STANDBY);  Surgeon: Ishmael Farias MD;  Location: [facility]    PICC TRIPLE LUMEN PLACEMENT  01/12/2023         MS ABLATE HEART DYSRHYTHM FOCUS      Description: Catheter Ablation Atrial Fibrillation;  Recorded: 07/31/2012;  Comments: 7/24/12 PVI with Dr. Gardiner and nilay to all 5 pulm veins and CTI fl ablation line as well.    TRANSCATHETER AORTIC-VALVE REPLACEMENT      ZZC SUPRACERV ABD HYSTERECTOMY      Description: Supracervical Hysterectomy;  Proc Date: 01/01/1985;  Comments: some cervix left!; ovaries intact; done for bleeding           CURRENT MEDICATIONS:    ACCU-CHEK SOFTCLIX LANCETS lancets  albuterol (PROAIR HFA/PROVENTIL HFA/VENTOLIN HFA) 108 (90 Base) MCG/ACT inhaler  apixaban ANTICOAGULANT (ELIQUIS) 5 MG tablet  atorvastatin (LIPITOR) 20 MG tablet  BD ULTRA-FINE SHORT PEN NEEDLE 31  "gauge x 5/16\" Ndle  blood glucose (ONETOUCH VERIO IQ) test strip  blood-glucose meter (ONETOUCH VERIO IQ METER) Misc  budesonide-formoterol (SYMBICORT) 160-4.5 MCG/ACT Inhaler  carbamide peroxide (DEBROX) 6.5 % otic solution  diaper,brief,adult,disposable (ADULT BRIEFS - LARGE) Misc  diltiazem ER COATED BEADS (CARDIZEM CD/CARTIA XT) 120 MG 24 hr capsule  Emollient (EUCERIN ORIGINAL HEALING) LOTN  empagliflozin (JARDIANCE) 10 MG TABS tablet  furosemide (LASIX) 20 MG tablet  gabapentin (NEURONTIN) 600 MG tablet  generic lancets (FINGERSTIX LANCETS)  ipratropium - albuterol 0.5 mg/2.5 mg/3 mL (DUONEB) 0.5-2.5 (3) MG/3ML neb solution  meclizine (ANTIVERT) 25 MG tablet  metoprolol tartrate (LOPRESSOR) 25 MG tablet  naloxone (NARCAN) 4 MG/0.1ML nasal spray  Nutritional Supplements (ENSURE COMPLETE) LIQD  nystatin (NYSTOP) 420033 UNIT/GM external powder  omeprazole (PRILOSEC) 20 MG DR capsule  oxyCODONE IR (ROXICODONE) 10 MG tablet  potassium chloride ER (KLOR-CON M) 20 MEQ CR tablet  sucralfate (CARAFATE) 1 GM tablet        ALLERGIES:  Allergies   Allergen Reactions    Celebrex [Celecoxib] Rash     patient had butterfly rash - \"lupus-like\"      Latex Rash       FAMILY HISTORY:  Family History   Problem Relation Age of Onset    Heart Failure Mother     Cancer Other         paternal HX-laryngeal     Alcoholism Sister     No Known Problems Daughter     No Known Problems Maternal Grandmother     No Known Problems Maternal Grandfather     No Known Problems Paternal Grandmother     No Known Problems Paternal Grandfather     No Known Problems Maternal Aunt     No Known Problems Paternal Aunt     Alcoholism Sister     Alcoholism Brother     Alcoholism Father     Cancer Paternal Uncle         Gastric-Alcohol    Cancer Paternal Uncle         gastric-Alcohol    Hereditary Breast and Ovarian Cancer Syndrome No family hx of     Breast Cancer No family hx of     Colon Cancer No family hx of     Endometrial Cancer No family hx of     " Ovarian Cancer No family hx of        SOCIAL HISTORY:   Social History     Socioeconomic History    Marital status:    Tobacco Use    Smoking status: Some Days     Current packs/day: 0.25     Types: Cigarettes     Passive exposure: Never    Smokeless tobacco: Never    Tobacco comments:     seen by TTS inpatient on 3/31/22   Vaping Use    Vaping status: Never Used   Substance and Sexual Activity    Alcohol use: Yes     Comment: Alcoholic Drinks/day: very little    Drug use: No     Social Determinants of Health     Financial Resource Strain: Low Risk  (12/26/2023)    Financial Resource Strain     Within the past 12 months, have you or your family members you live with been unable to get utilities (heat, electricity) when it was really needed?: No   Food Insecurity: Low Risk  (12/26/2023)    Food Insecurity     Within the past 12 months, did you worry that your food would run out before you got money to buy more?: No     Within the past 12 months, did the food you bought just not last and you didn t have money to get more?: No   Transportation Needs: Low Risk  (12/26/2023)    Transportation Needs     Within the past 12 months, has lack of transportation kept you from medical appointments, getting your medicines, non-medical meetings or appointments, work, or from getting things that you need?: No   Interpersonal Safety: Low Risk  (4/1/2024)    Interpersonal Safety     Do you feel physically and emotionally safe where you currently live?: Yes     Within the past 12 months, have you been hit, slapped, kicked or otherwise physically hurt by someone?: No     Within the past 12 months, have you been humiliated or emotionally abused in other ways by your partner or ex-partner?: No   Housing Stability: Low Risk  (12/26/2023)    Housing Stability     Do you have housing? : Yes     Are you worried about losing your housing?: No       VITALS:  /56   Pulse 69   Temp 98.6  F (37  C) (Temporal)   Resp 26   Ht  "1.651 m (5' 5\")   Wt 65.8 kg (145 lb)   LMP  (LMP Unknown)   SpO2 91%   BMI 24.13 kg/m      PHYSICAL EXAM    Physical Exam  Constitutional: chronically ill appearing, no acute distress  HENT: Normocephalic, Atraumatic, Bilateral external ears normal, Oropharynx normal, mucous membranes moist, Nose normal. Neck-  Normal range of motion, No tenderness, Supple, No stridor.    Eyes: PERRL, EOMI, Conjunctiva normal, No discharge.   Respiratory:  No respiratory distress, No wheezing or crackles, slightly diminished breath sounds bilaterally, Speaks in full sentences easily. Satting normally on 6 L NC (baseline is 3 L)  Cardiovascular: Normal heart rate, Regular rhythm,  No murmurs, No rubs, No gallops. 2+ radial pulses bilaterally  GI: Bowel sounds normal, Soft, No tenderness, No masses, No rebound or guarding.   Musculoskeletal: 2+ DP pulses. No notable lower extremity edema. Good range of motion in all major joints.   Integument: Warm, Dry, No erythema, No rash. No petechiae.    Neurologic: Alert & oriented x 3, 5/5 strength in all 4 extremities bilaterally. Sensation intact to light touch in all 4 extremities and the face bilaterally. No focal deficits noted.   Psychiatric: Affect normal, Judgment normal, Mood normal. Cooperative.      LAB:  All pertinent labs reviewed and interpreted.     Labs Ordered and Resulted from Time of ED Arrival to Time of ED Departure   COMPREHENSIVE METABOLIC PANEL - Abnormal       Result Value    Sodium 140      Potassium 4.1      Carbon Dioxide (CO2) 35 (*)     Anion Gap 5 (*)     Urea Nitrogen 18.9      Creatinine 0.96 (*)     GFR Estimate 62      Calcium 9.2      Chloride 100      Glucose 188 (*)     Alkaline Phosphatase 113      AST 21      ALT <5      Protein Total 7.2      Albumin 3.8      Bilirubin Total 0.4     TROPONIN T, HIGH SENSITIVITY - Abnormal    Troponin T, High Sensitivity 34 (*)    CRP INFLAMMATION - Abnormal    CRP Inflammation 12.60 (*)    INR - Abnormal    INR 1.45 " (*)    BLOOD GAS VENOUS - Abnormal    pH Venous 7.32      pCO2 Venous 72 (*)     pO2 Venous 19 (*)     Bicarbonate Venous 37 (*)     Base Excess/Deficit Venous 11.1 (*)     FIO2 36      Oxyhemoglobin Venous 20 (*)     O2 Sat, Venous 20.1 (*)    CBC WITH PLATELETS AND DIFFERENTIAL - Abnormal    WBC Count 4.7      RBC Count 4.62      Hemoglobin 11.5 (*)     Hematocrit 39.4      MCV 85      MCH 24.9 (*)     MCHC 29.2 (*)     RDW 15.9 (*)     Platelet Count 125 (*)     % Neutrophils 59      % Lymphocytes 21      % Monocytes 18      % Eosinophils 2      % Basophils 0      % Immature Granulocytes 0      NRBCs per 100 WBC 0      Absolute Neutrophils 2.8      Absolute Lymphocytes 1.0      Absolute Monocytes 0.8      Absolute Eosinophils 0.1      Absolute Basophils 0.0      Absolute Immature Granulocytes 0.0      Absolute NRBCs 0.0     TROPONIN T, HIGH SENSITIVITY - Abnormal    Troponin T, High Sensitivity 33 (*)    COVID-19 VIRUS (CORONAVIRUS) BY PCR - Abnormal    SARS CoV2 PCR Positive (*)    LACTIC ACID WHOLE BLOOD WITH 1X REPEAT IN 2 HR WHEN >2 - Normal    Lactic Acid, Initial 0.9     NT PROBNP INPATIENT - Normal    N terminal Pro BNP Inpatient 804     PARTIAL THROMBOPLASTIN TIME - Normal    aPTT 38     MAGNESIUM - Normal    Magnesium 2.1     PHOSPHORUS - Normal    Phosphorus 3.7     ROUTINE UA WITH MICROSCOPIC REFLEX TO CULTURE   GLUCOSE MONITOR NURSING POCT   INR   CRP INFLAMMATION   COMPREHENSIVE METABOLIC PANEL   MAGNESIUM   PHOSPHORUS   GLUCOSE MONITOR NURSING POCT   GLUCOSE MONITOR NURSING POCT   GLUCOSE MONITOR NURSING POCT   GLUCOSE MONITOR NURSING POCT   BLOOD CULTURE   BLOOD CULTURE   RESPIRATORY AEROBIC BACTERIAL CULTURE   MRSA MSSA PCR, NASAL SWAB         RADIOLOGY:  Reviewed all pertinent imaging. Please see official radiology report.  CT Chest Pulmonary Embolism w Contrast   Final Result   IMPRESSION:      1.  No pulmonary embolism.      2.  Interval complete right upper lobe collapse with opacification of  the proximal right upper lobe airway of indeterminate etiology. Recommend pulmonary follow-up and direct visualization or repeat chest CT to exclude an obstructing lesion. Retained    airway debris.      3.  No significant change of moderate right pleural effusion, of indeterminate etiology.      4.  Moderately advanced emphysema.             EKG:    Performed at: [facility] Emergency Department on 7/12/24 at 19:36:32    Impression: sinus rhythm. Left axis deviation. Anterior infarct (cited on or before 3/7/24). No evidence of acute ischemia.     Rate: 69 BPM  Rhythm: sinus rhythm  Axis: -72  CA Interval: 106  QRS Interval: 102  QTc Interval: 435  ST Changes: none  Comparison: when compared with ECG of 4/24/24 14:34, questionable change in initial forces of anteroseptal leads.     I have independently reviewed and interpreted the EKG(s) documented above.    PROCEDURES:   None      [facility] Documentation:   CMS Diagnoses:               I, Nicolasa Moira, am serving as a scribe to document services personally performed by Ernestine Sweeney MD based on my observation and the provider's statements to me. I, Ernestine Sweeney MD, attest that Nicolasa Pizarro is acting in a scribe capacity, has observed my performance of the services and has documented them in accordance with my direction.    Ernestine Sweeney MD  [facility] EMERGENCY ROOM  [address] [id number] [phone]     Ernestine wSeeney MD  07/13/24 0132

## 2024-07-13 NOTE — CONSULTS
PULMONARY/CRITICAL CARE CONSULT NOTE    Date / Time of Admission:  7/12/2024  7:58 PM  Assessment:   74yoF with emphysema on home O2, afib on Eliquis here with hypoxia in setting of RUL collapse and chronic moderate right pleural effusion.     Clinically Significant Risk Factors Present on Admission               # Drug Induced Coagulation Defect: home medication list includes an anticoagulant medication  # Thrombocytopenia: Lowest platelets = 107 in last 2 days, will monitor for bleeding   # Hypertension: Noted on problem list   # Non-Invasive mechanical ventilation: current O2 Device: High Flow Nasal Cannula (HFNC)  # Acute hypoxic respiratory failure: continue supplemental O2 as needed   # Anemia: based on hgb <11               # Financial/Environmental Concerns:     # Pacemaker present    # Anemia: based on hgb <11         Principal Problem:    Acute on chronic respiratory failure with hypoxemia (H)  Active Problems:    Nicotine Dependence    Fibromyalgia    Atrial fibrillation, unspecified type (H)    Acute on chronic respiratory failure with hypoxia (H)    Aortic stenosis, severe    Pneumonia due to 2019 novel coronavirus      Advance Directives:  Full Code  Critical Care Time greater than: 45 minutes, critically ill requiring high-flow oxygen and high risk of requiring intubation.       Plan:   Pulmonary:  1) Chronic moderate right-sided pleural effusion  2) Underlying emphysema  3) Acute on chronic hypoxic respiratory failure  4) RUL collapse  -Bronchial hygiene, CXR this am already appears improved  -Duoneb and mucomyst  -Consider bronchoscopy tomorrow if RUL not improved but would likely need to be intubated for this  -Thoracentesis  -Wean high-flow as able.    C/V:  1) Afib  2) Hypertension  -Continue home diltiazem, lopressor  -Continue home oral lasix, can consider IV dose if hypoxia worsens.   -Continue Apixaban    ID:  1) COVID infection  -Remdesivir  -Decadron  -Sputum culture pending    Heme;  1)  Leukopenia in setting of COVID infection  -Monitor              Subjective:    cc: shortness of breath    HPI: 74 year old female with history of emphysema on home O2, DMII afib on anticoagulation who presents with hypoxia found to be COVID +. Reports fever and chills at home.    She was initiated on remdesivir and decadron in ED but overnight worsening hypoxia requiring high-flow.     Past Medical History:   Diagnosis Date    Anemia     Aortic stenosis     Aortic valve disorder     Atrial fibrillation (H)     Atrial flutter (H)     Benign neoplasm of adenomatous polyp     large intestine     Chronic constipation     Chronic heart failure with preserved ejection fraction (H) 02/29/2024    Chronic pain syndrome     Congestive heart failure (H)     COPD (chronic obstructive pulmonary disease) (H)     Oxygen at night     Dependence on supplemental oxygen     Oxygen at noc, during the day as needed    Depression     Diabetes mellitus (H)     Dry eye syndrome     Fibromyalgia     Ganglion     left wrist    GERD (gastroesophageal reflux disease)     Hyperlipidemia     Hypertension     Hypokalemia     Infective otitis externa, unspecified     Created by Conversion     Larynx edema     Lung disease     Malignant neoplasm of vulva (H)     Created by Conversion [facility] Annotation: Apr 17 2007  8:24AM - Cammy Bui:  resection per Dr. Alfonso Mane 9/06;  Replacement Utility updated for latest IMO load    Medial epicondylitis     Onychomycosis     Osteoarthritis     Peptic ulcer     Polyneuropathy     Vulvar malignant neoplasm (H)        Social History     Tobacco Use    Smoking status: Some Days     Current packs/day: 0.25     Types: Cigarettes     Passive exposure: Never    Smokeless tobacco: Never    Tobacco comments:     seen by TTS inpatient on 3/31/22   Substance Use Topics    Alcohol use: Yes     Comment: Alcoholic Drinks/day: very little       Family History   Problem Relation Age of Onset    Heart  Failure Mother     Cancer Other         paternal HX-laryngeal     Alcoholism Sister     No Known Problems Daughter     No Known Problems Maternal Grandmother     No Known Problems Maternal Grandfather     No Known Problems Paternal Grandmother     No Known Problems Paternal Grandfather     No Known Problems Maternal Aunt     No Known Problems Paternal Aunt     Alcoholism Sister     Alcoholism Brother     Alcoholism Father     Cancer Paternal Uncle         Gastric-Alcohol    Cancer Paternal Uncle         gastric-Alcohol    Hereditary Breast and Ovarian Cancer Syndrome No family hx of     Breast Cancer No family hx of     Colon Cancer No family hx of     Endometrial Cancer No family hx of     Ovarian Cancer No family hx of        Current Facility-Administered Medications   Medication Dose Route Frequency Provider Last Rate Last Admin    acetaminophen (TYLENOL) tablet 650 mg  650 mg Oral Q4H PRN Bridgett Vazquez PA-C        Or    acetaminophen (TYLENOL) Suppository 650 mg  650 mg Rectal Q4H PRN Bridgett Vazquez PA-C        albuterol (PROVENTIL HFA/VENTOLIN HFA) inhaler  2 puff Inhalation Q4H PRN Bridgett Vazquez PA-C        apixaban ANTICOAGULANT (ELIQUIS) tablet 5 mg  5 mg Oral BID Bridgett Vazquez PA-C        atorvastatin (LIPITOR) tablet 20 mg  20 mg Oral At Bedtime Bridgett Vazquez PA-C        calcium carbonate (TUMS) chewable tablet 1,000 mg  1,000 mg Oral 4x Daily PRN Bridgett Vazquez PA-C        [Held by provider] carbamide peroxide (DEBROX) 6.5 % otic solution 5 drop  5 drop Both Ears BID Brdigett Vazquez PA-C        dexAMETHasone (DECADRON) tablet 6 mg  6 mg Oral Daily Bridgett Vazquez PA-C        glucose gel 15-30 g  15-30 g Oral Q15 Min PRN Bridgett Vazquez PA-C        Or    dextrose 50 % injection 25-50 mL  25-50 mL Intravenous Q15 Min PRN Bridgett Vazquez PA-C        Or    glucagon injection 1 mg  1 mg Subcutaneous Q15 Min PRN Bridgett Vazquez PA-C         diltiazem ER COATED BEADS (CARDIZEM CD/CARTIA XT) 24 hr capsule 120 mg  120 mg Oral Daily Lisette Boucher MD        [Held by provider] empagliflozin (JARDIANCE) tablet 10 mg  10 mg Oral Daily Bridgett Vazquez PA-C        furosemide (LASIX) tablet 40 mg  40 mg Oral Daily Bridgett Vazquez PA-C        gabapentin (NEURONTIN) tablet 600 mg  600 mg Oral TID Bridgett Vazquez PA-C        guaiFENesin (MUCINEX) 12 hr tablet 600 mg  600 mg Oral BID Bridgett Vazquez PA-C   600 mg at 07/13/24 0152    insulin aspart (NovoLOG) injection (RAPID ACTING)  1-3 Units Subcutaneous TID AC Bridgett Vazquez PA-C        insulin aspart (NovoLOG) injection (RAPID ACTING)  1-3 Units Subcutaneous At Bedtime Bridgett Vazquez PA-C        ipratropium - albuterol 0.5 mg/2.5 mg/3 mL (DUONEB) neb solution 3 mL  3 mL Nebulization Q4H PRN Lisette Boucher MD        lidocaine (LMX4) cream   Topical Q1H PRN Bridgett Vazquez PA-C        lidocaine 1 % 0.1-1 mL  0.1-1 mL Other Q1H PRN Bridgett Vazquez PA-C        metoprolol tartrate (LOPRESSOR) tablet 25 mg  25 mg Oral BID Bridgett Vazquez PA-C        naloxone (NARCAN) nasal spray 4 mg  4 mg Alternating Nostrils Once PRN Bridgett Vazquez PA-C        ondansetron (ZOFRAN ODT) ODT tab 4 mg  4 mg Oral Q6H PRN Bridgett Vazquez PA-C        Or    ondansetron (ZOFRAN) injection 4 mg  4 mg Intravenous Q6H PRN Bridgett Vazquez PA-C        oxyCODONE (ROXICODONE) tablet 10 mg  10 mg Oral Q6H PRN Bridgett Vazquez PA-C        pantoprazole (PROTONIX) EC tablet 40 mg  40 mg Oral BID Bridgett Vazquez PA-C        Patient is already receiving anticoagulation with heparin, enoxaparin (LOVENOX), warfarin (COUMADIN)  or other anticoagulant medication   Does not apply Continuous PRN Bridgett Vazquez PA-C        piperacillin-tazobactam (ZOSYN) 3.375 g vial to attach to  mL bag  3.375 g Intravenous Q8H Bridgett Vazquez PA-C   3.375 g at  "07/13/24 0604    polyethylene glycol (MIRALAX) Packet 17 g  17 g Oral BID PRN Bridgett Vazquez PA-C        potassium chloride john ER (KLOR-CON M20) CR tablet 20 mEq  20 mEq Oral Daily Bridgett Vazquez PA-C        [Held by provider] remdesivir 100 mg in sodium chloride 0.9 % 250 mL intermittent infusion  100 mg Intravenous Q24H Bridgett Vazquez PA-C        And    sodium chloride 0.9% BOLUS 50 mL  50 mL Intravenous Q24H Bridgett Vazquez PA-C        senna-docusate (SENOKOT-S/PERICOLACE) 8.6-50 MG per tablet 1 tablet  1 tablet Oral BID PRN Bridgett Vazquez PA-C        Or    senna-docusate (SENOKOT-S/PERICOLACE) 8.6-50 MG per tablet 2 tablet  2 tablet Oral BID PRN Bridgett Vazquez PA-C        sodium chloride (PF) 0.9% PF flush 3 mL  3 mL Intracatheter Q8H Bridgett Vazquez PA-C   3 mL at 07/13/24 0147    sodium chloride (PF) 0.9% PF flush 3 mL  3 mL Intracatheter q1 min prn Bridgett Vazquez PA-C        sucralfate (CARAFATE) tablet 1 g  1 g Oral 4x Daily AC & HS Bridgett Vazquez PA-C             Review of Systems: 12-point Review performed and negative aside from that noted in HPI.    Objective:    Vital signs:  BP 99/52 (BP Location: Left arm)   Pulse 72   Temp 97.6  F (36.4  C) (Oral)   Resp 20   Ht 1.651 m (5' 5\")   Wt 65.8 kg (145 lb)   LMP  (LMP Unknown)   SpO2 94%   BMI 24.13 kg/m      GENERAL APPEARANCE: healthy, alert and no distress     EYES: EOMI, - PERRL     NECK: no adenopathy, no asymmetry, masses, or scars and thyroid normal to palpation     RESP: decreased breath sounds bilaterally     CV: irregularly irregular.      ABDOMEN:  soft, nontender, no HSM or masses and bowel sounds normal     MS: extremities normal- no gross deformities noted, no evidence of inflammation in joints, FROM in all extremities.     SKIN: no suspicious lesions or rashes     NEURO: Normal strength and tone, sensory exam grossly normal, mentation intact and speech normal     PSYCH: " mentation appears normal. and affect normal/bright     LYMPHATICS: No axillary, cervical, inguinal, or supraclavicular nodes        Data    CT chest: personally reviewed  EXAM: CT CHEST PULMONARY EMBOLISM W CONTRAST  LOCATION: [facility]  DATE: 7/12/2024     INDICATION: covid positive, hypoxia, eval for pneumonia, PE  COMPARISON: 2/6/2024  TECHNIQUE: CT chest pulmonary angiogram during arterial phase injection of IV contrast. Multiplanar reformats and MIP reconstructions were performed. Dose reduction techniques were used.   CONTRAST: 75 ML ISOVUE 370     FINDINGS:  ANGIOGRAM CHEST: No pulmonary artery embolism.     LUNGS AND PLEURA: Since February 2024, development of right upper lobe collapse with right upper lobe airway becoming completely opacified shortly after its origin. No significant change of middle and right lower lobe volume loss with patent middle and   right lower lobe airways. Moderate right pleural effusion. No pneumothorax.     MEDIASTINUM/AXILLAE: Compromised evaluation of right perihilar adenopathy from adjacent volume loss. No significant mediastinal or left perihilar adenopathy. Normal heart size. No pericardial effusion. Aortic valvular prosthetic. Dual-chamber pacer.   Patulous esophagus.     CORONARY ARTERY CALCIFICATION: Moderate.      UPPER ABDOMEN: No actionable findings.     MUSCULOSKELETAL: Bony demineralization and degenerative changes.                                                                      IMPRESSION:     1.  No pulmonary embolism.     2.  Interval complete right upper lobe collapse with opacification of the proximal right upper lobe airway of indeterminate etiology. Recommend pulmonary follow-up and direct visualization or repeat chest CT to exclude an obstructing lesion. Retained   airway debris.     3.  No significant change of moderate right pleural effusion, of indeterminate etiology.     4.  Moderately advanced  emphysema.    Laboratory:  Results for orders placed or performed in visit on 05/22/24   Basic metabolic panel   Result Value Ref Range    Sodium 143 135 - 145 mmol/L    Potassium 4.2 3.4 - 5.3 mmol/L    Chloride 101 98 - 107 mmol/L    Carbon Dioxide (CO2) 35 (H) 22 - 29 mmol/L    Anion Gap 7 7 - 15 mmol/L    Urea Nitrogen 15.5 8.0 - 23.0 mg/dL    Creatinine 0.77 0.51 - 0.95 mg/dL    GFR Estimate 80 >60 mL/min/1.73m2    Calcium 9.4 8.8 - 10.2 mg/dL    Glucose 95 70 - 99 mg/dL     Lab Results   Component Value Date    WBC 3.0 (L) 07/13/2024    HGB 10.9 (L) 07/13/2024    HCT 37.5 07/13/2024    MCV 85 07/13/2024     (L) 07/13/2024

## 2024-07-13 NOTE — PROGRESS NOTES
[facility]    Medicine Progress Note - Hospitalist Service    Date of Admission:  7/12/2024    Assessment & Plan   Mary Kay Tejada is a 74 year old female admitted with acute on chronic hypoxic and hypercarbic respiratory failure in the setting of covid19.  She has had modest improvement in hypoxia this morning.  She has been afebrile and with a mild leukopenia.  Given the chronicity of her pleural effusion, suspect her acute respiratory failure is largely driven by covid19 with possible mucous plugging and associated partial lung collapse.  Hold empiric abx for now given demonstrated sarscov2 infection.  Discussed with pulmonology.    # Acute on chronic hypoxic and hypercarbic respiratory failure 2/2 covid19  - dexamethasone  - remdesivir  - hold antibiotics  - pulm consulted    # KATTY, improved    # DM  - ISS    # Hx severe aortic stenosis s/p TAVR  # HFpEF  # Afib  - furosemide  - apixaban  - diltiazem, metoprolol          Diet: Fluid restriction 1800 ML FLUID  NPO for Medical/Clinical Reasons Except for: Meds, Ice Chips      Chairez Catheter: Not present  Lines: None     Cardiac Monitoring: ACTIVE order. Indication: hypoxia  Code Status: Full Code      Clinically Significant Risk Factors Present on Admission               # Drug Induced Coagulation Defect: home medication list includes an anticoagulant medication  # Thrombocytopenia: Lowest platelets = 107 in last 2 days, will monitor for bleeding   # Hypertension: Noted on problem list   # Non-Invasive mechanical ventilation: current O2 Device: High Flow Nasal Cannula (HFNC)  # Acute hypoxic respiratory failure: continue supplemental O2 as needed   # Anemia: based on hgb <11               # Financial/Environmental Concerns:     # Pacemaker present       Disposition Plan     Medically Ready for Discharge: Anticipated in 2-4 Days             Johnny Arellano MD  Hospitalist Service  [facility]  Securely message  with [employer] (more info)  Text page via [facility] Paging/Directory   ______________________________________________________________________    Interval History   Denies dyspnea.  Reports pressure on the right side of her back.    Physical Exam   Vital Signs: Temp: 97.6  F (36.4  C) Temp src: Oral BP: 99/52 Pulse: 72   Resp: 20 SpO2: 94 % O2 Device: High Flow Nasal Cannula (HFNC) Oxygen Delivery: 45 LPM  Weight: 145 lbs 0 oz    Gen:  lying in bed in no extremis  Neuro: alert, conversant  CV:  nl rate, regular rhythm to palpatio  Pulm: no acute resp distress, crackles at right base    Medical Decision Making             Data   Reviewed:  Na 141  K 4.6  BUN 19  Cr 0.87    WBC 3  Hgb 11  Plts 107    CXR  IMPRESSION: Similar moderate right pleural effusion with adjacent opacity favoring atelectasis. Given differences in technique and single AP view, right upper lung aeration appears improved. Left lung remains clear. Cardiomediastinal silhouette, left   chest wall cardiac device and TAVR.

## 2024-07-13 NOTE — CONSULTS
Care Management Initial Consult    General Information  Assessment completed with: Patient,    Type of CM/SW Visit: Initial Assessment    Primary Care Provider verified and updated as needed: Yes   Readmission within the last 30 days: no previous admission in last 30 days      Reason for Consult: discharge planning  Advance Care Planning: Advance Care Planning Reviewed: no concerns identified          Communication Assessment  Patient's communication style: spoken language (English or Bilingual)    Hearing Difficulty or Deaf: no   Wear Glasses or Blind: no    Cognitive  Cognitive/Neuro/Behavioral: WDL                      Living Environment:   People in home: child(david), adult     Current living Arrangements: house      Able to return to prior arrangements: yes       Family/Social Support:  Care provided by: self, child(david)  Provides care for: no one, unable/limited ability to care for self  Marital Status:   Children          Description of Support System: Supportive, Involved    Support Assessment: Adequate family and caregiver support    Current Resources:   Patient receiving home care services: No     Community Resources: County Worker, County Programs, PCA  Equipment currently used at home:  (SEC , 4ww and FWW; occasional use)  Supplies currently used at home: Diabetic Supplies, Oxygen Tubing/Supplies    Employment/Financial:  Employment Status: retired        Financial Concerns: none   Referral to Financial Worker: No       Does the patient's insurance plan have a 3 day qualifying hospital stay waiver?  No    Lifestyle & Psychosocial Needs:  Social Determinants of Health     Food Insecurity: Low Risk  (12/26/2023)    Food Insecurity     Within the past 12 months, did you worry that your food would run out before you got money to buy more?: No     Within the past 12 months, did the food you bought just not last and you didn t have money to get more?: No   Depression: Not at risk (1/29/2024)    PHQ-2      PHQ-2 Score: 0   Housing Stability: Low Risk  (12/26/2023)    Housing Stability     Do you have housing? : Yes     Are you worried about losing your housing?: No   Tobacco Use: High Risk (7/12/2024)    Patient History     Smoking Tobacco Use: Some Days     Smokeless Tobacco Use: Never     Passive Exposure: Never   Financial Resource Strain: Low Risk  (12/26/2023)    Financial Resource Strain     Within the past 12 months, have you or your family members you live with been unable to get utilities (heat, electricity) when it was really needed?: No   Alcohol Use: Not on file   Transportation Needs: Low Risk  (12/26/2023)    Transportation Needs     Within the past 12 months, has lack of transportation kept you from medical appointments, getting your medicines, non-medical meetings or appointments, work, or from getting things that you need?: No   Physical Activity: Not on file   Interpersonal Safety: Low Risk  (4/1/2024)    Interpersonal Safety     Do you feel physically and emotionally safe where you currently live?: Yes     Within the past 12 months, have you been hit, slapped, kicked or otherwise physically hurt by someone?: No     Within the past 12 months, have you been humiliated or emotionally abused in other ways by your partner or ex-partner?: No   Stress: Not on file   Social Connections: Not on file   Health Literacy: Not on file       Functional Status:  Prior to admission patient needed assistance:   Dependent ADLs:: Ambulation-walker, Bathing  Dependent IADLs:: Cleaning, Cooking, Laundry, Shopping, Meal Preparation, Medication Management, Money Management, Transportation       Mental Health Status:  Mental Health Status: No Current Concerns       Chemical Dependency Status:  Chemical Dependency Status: No Current Concerns             Values/Beliefs:  Spiritual, Cultural Beliefs, Jewish Practices, Values that affect care: no               Additional Information:  SWCM completed CM assessment via the  phone due to isolation precautions. [facility] introduced self and CM services to Pt. Pt lives with her son and his wife Cynthia.  Pt has 02 at home through [facility] and on 3L.  Uses mainly at night.  Cynthia is employed by [employer] and is PCA for. Pt.  Cynthia helps with medications, dressing and laundry.  Pt anticipates returning home with her family.  Family to transport.     LUZ Toro

## 2024-07-13 NOTE — PROGRESS NOTES
"/56   Pulse 69   Temp 98.6  F (37  C) (Temporal)   Resp 26   Ht 1.651 m (5' 5\")   Wt 65.8 kg (145 lb)   LMP  (LMP Unknown)   SpO2 91%   BMI 24.13 kg/m      The PT was provided a neb per MD order. The PT C/O SOB and a cough. The RR was into the low to mid 20s and she had a congested cough. BS were diminished and slightly coarse both pre and post neb. RT will follow as directed.    Addendum: The PT was started on Q4 nebs (one provided just before midnight), but this was later changed to PRN. When I arrived for the midnight TX, her NC had been turned up to just over 6 liters with SATs in the high 80s. Not long after that neb, I was called back to the ER after her SATs declined into the low 80s requiring that she be put on an OXYMASK..     Even at 15 liters AND after I woke her up (she does snore, so questionable LISA component), I could not get her SATs out of the high 80s (order is for SATs of 90%). At this point, I established the PT on a HFNC (45 liters at 65%) with SATs 91% to 93%. The MD was notified.    Once upstairs, her SATs declined (again, after she fell asleep), requiring the RN to increase the FIO2. Also, she slept with her mouth open, so I increased the flow, but her SATs remained at ~89%. Eventually, she was moved from  to the ICU.  "

## 2024-07-13 NOTE — PROGRESS NOTES
Pt transferred to higher level of care with ^02 needs. Is currently settled in her room lying on right side and comfortable, denying pain or CONTE. Titrating HFNC, currently on 50L/60% with sats 90-94%. VSS. Call light in reach to make needs known.

## 2024-07-13 NOTE — PROGRESS NOTES
Patient is on HFNC 45L 45% and stable.   Good strong cough. Was able to send sputum sample down, and she is getting stuff up now. Mucomyst, Duoneb and volera QID started this am. Pt feels this is loosening things up. Breath sounds diminished. Pt tolerating volera well  RT will continue to monitor    Carmita Rome, RT

## 2024-07-13 NOTE — PHARMACY-VANCOMYCIN DOSING SERVICE
Pharmacy Vancomycin Initial Note  Date of Service 2024  Patient's  1950  74 year old, female    Indication: Sepsis    Current estimated CrCl = Estimated Creatinine Clearance: 53.4 mL/min (A) (based on SCr of 0.96 mg/dL (H)).    Creatinine for last 3 days  2024:  8:30 PM Creatinine 0.96 mg/dL    Recent Vancomycin Level(s) for last 3 days  No results found for requested labs within last 3 days.      Vancomycin IV Administrations (past 72 hours)        No vancomycin orders with administrations in past 72 hours.                    Nephrotoxins and other renal medications (From now, onward)      Start     Dose/Rate Route Frequency Ordered Stop    24 2230  piperacillin-tazobactam (ZOSYN) 3.375 g vial to attach to  mL bag         3.375 g  over 30 Minutes Intravenous ONCE 24 2230  vancomycin (VANCOCIN) 1,250 mg in 0.9% NaCl 250 mL intermittent infusion         1,250 mg  166.7 mL/hr over 90 Minutes Intravenous ONCE 24 2220              Contrast Orders - past 72 hours (72h ago, onward)      Start     Dose/Rate Route Frequency Stop    24 2130  iopamidol (ISOVUE-370) solution 75 mL         75 mL Intravenous ONCE 24             Plan:  Start vancomycin  1250 mg IV x 1.    Consult pharmacy to dose if vancomycin to continue.     Tory Bruce, [facility]

## 2024-07-13 NOTE — PLAN OF CARE
Physical Therapy Discharge Summary    Reason for therapy discharge:    Intensivist recommended discontinue PT for now.     Progress towards therapy goal(s). See goals on Care Plan in [facility] electronic health record for goal details.  Goals not met.  Barriers to achieving goals:   MD cancelled PT.    Therapy recommendation(s):    Resume PT if and when MD reorders.

## 2024-07-13 NOTE — MEDICATION SCRIBE - ADMISSION MEDICATION HISTORY
Medication Scribe Admission Medication History    Admission medication history is complete. The information provided in this note is only as accurate as the sources available at the time of the update.    Information Source(s): Patient, Family member, and CareEverywhere/SureScripts via in-person    Pertinent Information: Son was present in the room for the interview. Patient reports taking all medications for the day. Took PM/HS medications PTA which would have been approximately 1900.     On apixaban 4 mg BID.     Reports having a home health aid who sets up medications weekly/monthly/etc.     Changes made to PTA medication list:  Added: None  Deleted: None  Changed: None    Allergies reviewed with patient and updates made in EHR: yes    Medication History Completed By: Harry Liriano 7/12/2024 8:28 PM    PTA Med List   Medication Sig Last Dose    albuterol (PROAIR HFA/PROVENTIL HFA/VENTOLIN HFA) 108 (90 Base) MCG/ACT inhaler INHALE 2 PUFFS INTO THE LUNGS EVERY 4 HOURS AS NEEDED FOR WHEEZING 7/12/2024 at PM; used 4 times today; has inhaler with    apixaban ANTICOAGULANT (ELIQUIS) 5 MG tablet Take 1 tablet (5 mg) by mouth 2 times daily 7/12/2024 at PM PTA; 2 of 2    atorvastatin (LIPITOR) 20 MG tablet TAKE 1 TABLET(20 MG) BY MOUTH AT BEDTIME 7/12/2024 at PM PTA    budesonide-formoterol (SYMBICORT) 160-4.5 MCG/ACT Inhaler INHALE 2 PUFFS INTO THE LUNGS TWICE DAILY 7/12/2024 at PM PTA; does not have with    carbamide peroxide (DEBROX) 6.5 % otic solution Place 5 drops into both ears 2 times daily Past Week    diltiazem ER COATED BEADS (CARDIZEM CD/CARTIA XT) 120 MG 24 hr capsule TAKE 1 CAPSULE(120 MG) BY MOUTH DAILY 7/12/2024 at AM    Emollient (EUCERIN ORIGINAL HEALING) LOTN APPLY LIBERALLY TO DRY SKIN TWICE DAILY AS NEEDED Past Week at PRN    empagliflozin (JARDIANCE) 10 MG TABS tablet Take 1 tablet (10 mg) by mouth daily 7/12/2024 at AM    furosemide (LASIX) 20 MG tablet Take 2 tablets (40 mg) by mouth daily  7/12/2024 at AM; 40 mg    gabapentin (NEURONTIN) 600 MG tablet TAKE 1 TABLET(600 MG) BY MOUTH THREE TIMES DAILY 7/12/2024 at PM PTA; 3 of 3    ipratropium - albuterol 0.5 mg/2.5 mg/3 mL (DUONEB) 0.5-2.5 (3) MG/3ML neb solution INHALE 1 VIAL VIA NEBULIZER EVERY 6 HOURS AS NEEDED FOR SHORTNESS OF BREATH OR WHEEZING Past Week at few days ago    meclizine (ANTIVERT) 25 MG tablet TAKE 1 TABLET(25 MG) BY MOUTH THREE TIMES DAILY AS NEEDED FOR DIZZINESS OR NAUSEA More than a month at PRN    metoprolol tartrate (LOPRESSOR) 25 MG tablet Take 1 tablet (25 mg) by mouth 2 times daily 7/12/2024 at PM PTA; 2 of 2    naloxone (NARCAN) 4 MG/0.1ML nasal spray Spray 1 spray (4 mg) into one nostril alternating nostrils once as needed for opioid reversal every 2-3 minutes until assistance arrives has never used    Nutritional Supplements (ENSURE COMPLETE) LIQD Take 1 Can by mouth daily Past Month at over a week ago    nystatin (NYSTOP) 657087 UNIT/GM external powder APPLY TO THE AFFECTED AREA(S) 2-3 TIMES DAILY AS NEEDED Past Week at PRN    omeprazole (PRILOSEC) 20 MG DR capsule TAKE 1 CAPSULE(20 MG) BY MOUTH TWICE DAILY 7/12/2024 at PM PTA    oxyCODONE IR (ROXICODONE) 10 MG tablet Take 1 tablet (10 mg) by mouth every 6 hours as needed for moderate to severe pain Past Month at few days ago, none today    potassium chloride ER (KLOR-CON M) 20 MEQ CR tablet TAKE 1 TABLET(20 MEQ) BY MOUTH DAILY 7/12/2024 at AM    sucralfate (CARAFATE) 1 GM tablet CRUSH ONE TABLET AND MIX WITH A LLITTLE WATER AND SWALLOW FOUR TIMES DAILY 7/12/2024 at PM PTA; 4 of 4

## 2024-07-13 NOTE — ED TRIAGE NOTES
Patient arrives to the ER with c/o shortness of breath. SHe tested positive for Covid this morning. Sats on RA 76%.  She does have COPD and uses oxygen as needed at home. She did not wear her O2 to the ER. Placed her on 6L via NC to get sats to 89-90%.      Triage Assessment (Adult)       Row Name 07/12/24 2784          Triage Assessment    Airway WDL WDL        Respiratory WDL    Respiratory WDL X;rhythm/pattern     Rhythm/Pattern, Respiratory shortness of breath        Skin Circulation/Temperature WDL    Skin Circulation/Temperature WDL WDL        Cardiac WDL    Cardiac WDL WDL        Peripheral/Neurovascular WDL    Peripheral Neurovascular WDL WDL        Cognitive/Neuro/Behavioral WDL    Cognitive/Neuro/Behavioral WDL WDL

## 2024-07-13 NOTE — H&P
[facility]    History and Physical - Hospitalist Service       Date of Admission:  7/12/2024    Assessment & Plan      Mary Kay Tejada is a 74 year old female admitted on 7/12/2024. She has a PMH of DM type II, advanced emphysema with prn home O2, HTN, severe aortic stenosis s/p TAVR, paroxysmal atrial fibrillation s/p pacemaker, chronic pain, fibromyalgia, HLD, GERD presented to the ED for worsening shortness of breath and home COVID test positive today. CT chest PE was negative for pulmonary embolism, did show interval complete right upper lobe collapse with opacification of the proximal right upper lobe area of indeterminate etiology.  Moderate right pleural effusion and moderate advanced emphysema.  She will be admitted to cardiac telemetry with IV Zosyn, urology consultation.  COVID-19 PCR pending, agreeable to start remdesivir COVID-19 positive confirmed.  She was requiring 7L O2 on admission     Acute on chronic toxic respiratory failure  RUL collapse   Moderate right pleural effusion  Acute COVID -19  Advanced Emphysema   - utilizes 3L O2 at baseline, requiring 7 L on admission  - CT chest PE:  No pulmonary embolism. Interval complete right upper lobe collapse with opacification of the proximal right upper lobe airway of indeterminate etiology. Recommend pulmonary follow-up and direct visualization or repeat chest CT to exclude an obstructing lesion. Retained airway debris.No significant change of moderate right pleural effusion, of indeterminate etiology.Moderately advanced emphysema.  - reports prior vaccinations against COVID  - COVID isolation precautions initiated    Plan   -Blood cultures and sputum cultures ordered  - received decadron 10 mg IV in ED, continue Decadron 6mg daily to start tomorrow   - Duonebs Q4 hrs  - continue IV zosyn for atypical coverage   - RTAT   -Initiate remdesivir protocol if COVID-19 PCR panel positive  -AM CBC, CRP  - Appreciate pulmonary  consultation     Mild elevation in Troponin   -Secondary to demand ischemia  -Patient denying chest pain palpitations  -EKG reviewed by me with no ischemic changes    Severe aortic stenosis: S/p TAVR February 2024   Paroxysmal atrial fibrillation s/p pacemaker March 2024  -Heart rate mid 60s on admission  -resume PTA Cardizem 120 mg, Eliquis 5 mg twice daily  - Tele initiated     DM type II  -Hemoglobin A1c was 6.2% in February 2024  -ACHS Accu-Cheks and sliding scale insulin initiated    HFpEF  -Euvolemic on admission  -Resume PTA Lasix 40 mg daily, Jardiance 10 mg daily    HTN  -BP well controlled   -Resume PTA metoprolol with holding parameters    Nicotine Dependence   -Smokes approximately 0.5-1 pack of cigarettes daily  -Patient declined nicotine patch, smoking cessation education placed    Comorbidities  Chronic pain  Fibromyalgia  HLD - PTA statin resumed   GERD -Prilosec resumed  Diabetic polyneuropathy -gabapentin 600 mg 3 times daily        Diet: Combination Diet Low Saturated Fat Na <2400mg Diet, No Caffeine Diet  DVT Prophylaxis: DOAC  Chairez Catheter: Not present  Lines: None     Cardiac Monitoring: ACTIVE order. Indication: hypoxia  Code Status: Full Code    Clinically Significant Risk Factors Present on Admission               # Drug Induced Coagulation Defect: home medication list includes an anticoagulant medication  # Thrombocytopenia: Lowest platelets = 125 in last 2 days, will monitor for bleeding   # Hypertension: Noted on problem list                 # Financial/Environmental Concerns:     # Pacemaker present       Disposition Plan     Medically Ready for Discharge: Anticipated in 2-4 Days         The patient's care was discussed with the Attending Physician, Dr. Boucher and the pt .    Bridgett Vazquez PA-C  Hospitalist Service  [facility]  Securely message with [employer] (more info)  Text page via [employer] Paging/Directory      ______________________________________________________________________    Chief Complaint   Shortness of breath, COVID -19 + at home test     History is obtained from the patient    History of Present Illness   Mary Kay Tejada is a 74 year old female who has a PMH of DM type II, advanced emphysema with prn home O2, HTN, severe aortic stenosis s/p TAVR, paroxysmal atrial fibrillation s/p pacemaker, chronic pain, fibromyalgia, HLD, GERD presented to the ED for worsening shortness of breath for approximately 1 day.  Reports she tested positive last night was negative however this morning home test was positive.  She utilizes 3 L of oxygen daily but noticed increased oxygen requirements over the past 24 hours.  Has had associated chills, productive cough with green sputum and subjective fevers.  Denies any recent sick contacts, chest pain, palpitations, abdominal pain, diarrhea, constipation, lower extremity edema.  Is otherwise feeling well after recent cardiac procedures earlier this year.  Admits to smoking 1/2 to 1 pack of cigarettes per day denies alcohol use or recreational drug use.     ED course: Presented with temperature of 98.6, pulse of 69, blood pressure 114/56 with oxygen of 76% on room air.  Subsequently placed on 6 L via nasal cannula.  Significant labs showing anion gap 5, CRP 12.6 initial troponin 34.  VBG with pH 7.32 with pCO2 of 72 and PaO2 19.  INR was 1.45. Lactate and WBCs WNL.  Blood cultures were ordered and CT chest PE was negative for pulmonary embolism, did show interval complete right upper lobe collapse with opacification of the proximal right upper lobe area of indeterminate etiology.  Moderate right pleural effusion and moderate advanced emphysema.  She was given 10 mg dexamethasone, DuoNeb, Zofran and 1 dose of IV Zosyn and vancomycin.    At the time of admission she was alert and oriented x 4 on 7 L of oxygen via nasal cannula in no acute distress speaking in full sentences with  no respiratory distress.  Discussed treatment plan with patient and her son at bedside and they were agreeable.  They confirm she is full code.      Past Medical History    Past Medical History:   Diagnosis Date    Anemia     Aortic stenosis     Aortic valve disorder     Atrial fibrillation (H)     Atrial flutter (H)     Benign neoplasm of adenomatous polyp     large intestine     Chronic constipation     Chronic heart failure with preserved ejection fraction (H) 02/29/2024    Chronic pain syndrome     Congestive heart failure (H)     COPD (chronic obstructive pulmonary disease) (H)     Oxygen at night     Dependence on supplemental oxygen     Oxygen at noc, during the day as needed    Depression     Diabetes mellitus (H)     Dry eye syndrome     Fibromyalgia     Ganglion     left wrist    GERD (gastroesophageal reflux disease)     Hyperlipidemia     Hypertension     Hypokalemia     Infective otitis externa, unspecified     Created by Conversion     Larynx edema     Lung disease     Malignant neoplasm of vulva (H)     Created by Conversion [facility] Annotation: Apr 17 2007  8:24AM - Cammy Bui:  resection per Dr. Alfonso Mane 9/06;  Replacement Utility updated for latest IMO load    Medial epicondylitis     Onychomycosis     Osteoarthritis     Peptic ulcer     Polyneuropathy     Vulvar malignant neoplasm (H)        Past Surgical History   Past Surgical History:   Procedure Laterality Date    BIOPSY BREAST Right     BIOPSY BREAST Right 01/28/2015    BIOPSY BREAST Right 01/28/2015    Procedure: RIGHT BREAST BIOPSY AFTER WIRE LOCALIZATION AT 0940;  Surgeon: Renée Soriano MD;  [place] [facility]  Service:     BIOPSY OF BREAST, INCISIONAL      Description: Incisional Breast Biopsy;  Recorded: 11/13/2007;  Comments: benign    COLONOSCOPY N/A 06/14/2019    Procedure: COLONOSCOPY;  Surgeon: Eduardo Mora MD;  [place] [facility]  Service: Gastroenterology    CV CORONARY ANGIOGRAM  N/A 02/08/2024    Procedure: CV CORONARY ANGIOGRAM;  Surgeon: Moises Valencia MD;  Location: [facility] CV    CV LEFT HEART CATH N/A 02/08/2024    Procedure: Left Heart Catheterization;  Surgeon: Moises Valencia MD;  Location: [facility] CV    CV RIGHT HEART CATH MEASUREMENTS RECORDED N/A 02/08/2024    Procedure: Right Heart Catheterization;  Surgeon: Moises Valencia MD;  Location: [facility] CV    CV TRANSCATHETER AORTIC VALVE REPLACEMENT-FEMORAL APPROACH N/A 02/20/2024    Procedure: Transcatheter Aortic Valve Replacement, possible cardiopulmonary bypass, possible surgical intervention;  Surgeon: Moises Valencia MD;  Location: [facility] CV    EP PACEMAKER DEVICE & IMPLANT- HIS LEAD DUAL N/A 3/7/2024    Procedure: Pacemaker Device & Lead Implant - HIS Lead Dual;  Surgeon: Donnell Leon MD;  Location: [facility] CV    ESOPHAGOSCOPY, GASTROSCOPY, DUODENOSCOPY (EGD), COMBINED N/A 11/06/2018    Procedure: ESOPHAGOGASTRODUODENOSCOPY;  Surgeon: Lit Fernando MD;  Location: [facility];  Service:     HYSTERECTOMY      JOINT REPLACEMENT Left     TKA    OR TRANSCATHETER AORTIC VALVE REPLACEMENT, FEMORAL PERCUTANEOUS APPROACH (STANDBY) N/A 02/20/2024    Procedure: OR TRANSCATHETER AORTIC VALVE REPLACEMENT, FEMORAL PERCUTANEOUS APPROACH (STANDBY);  Surgeon: Ishmael Farias MD;  Location: [facility] CV    PICC TRIPLE LUMEN PLACEMENT  01/12/2023         VT ABLATE HEART DYSRHYTHM FOCUS      Description: Catheter Ablation Atrial Fibrillation;  Recorded: 07/31/2012;  Comments: 7/24/12 PVI with Dr. Gardiner and nilay to all 5 pulm veins and CTI fl ablation line as well.    TRANSCATHETER AORTIC-VALVE REPLACEMENT      ZZC SUPRACERV ABD HYSTERECTOMY      Description: Supracervical Hysterectomy;  Proc Date: 01/01/1985;  Comments: some cervix left!; ovaries intact; done for bleeding       Prior to Admission Medications   Prior to Admission Medications  "  Prescriptions Last Dose Informant Patient Reported? Taking?   ACCU-CHEK SOFTCLIX LANCETS lancets   No No   Sig: [ACCU-CHEK SOFTCLIX LANCETS LANCETS] TEST THREE TIMES DAILY   BD ULTRA-FINE SHORT PEN NEEDLE 31 gauge x 5/16\" Ndle   No No   Sig: [BD ULTRA-FINE SHORT PEN NEEDLE 31 GAUGE X 5/16\" NDLE] TEST FOUR TIMES DAILY WITH MEALS AND AT BEDTIME   Emollient (EUCERIN ORIGINAL HEALING) LOTN Past Week at PRN  Yes Yes   Sig: APPLY LIBERALLY TO DRY SKIN TWICE DAILY AS NEEDED   Nutritional Supplements (ENSURE COMPLETE) LIQD Past Month at over a week ago  No Yes   Sig: Take 1 Can by mouth daily   albuterol (PROAIR HFA/PROVENTIL HFA/VENTOLIN HFA) 108 (90 Base) MCG/ACT inhaler 7/12/2024 at PM; used 4 times today; has inhaler with  No Yes   Sig: INHALE 2 PUFFS INTO THE LUNGS EVERY 4 HOURS AS NEEDED FOR WHEEZING   apixaban ANTICOAGULANT (ELIQUIS) 5 MG tablet 7/12/2024 at PM PTA; 2 of 2  No Yes   Sig: Take 1 tablet (5 mg) by mouth 2 times daily   atorvastatin (LIPITOR) 20 MG tablet 7/12/2024 at PM PTA  No Yes   Sig: TAKE 1 TABLET(20 MG) BY MOUTH AT BEDTIME   blood glucose (ONETOUCH VERIO IQ) test strip   No No   Sig: TEST THREE TIMES DAILY   blood-glucose meter (ONETOUCH VERIO IQ METER) Misc   No No   Sig: [BLOOD-GLUCOSE METER (ONETOUCH VERIO IQ METER) MISC] Check blood sugar three times a day.   budesonide-formoterol (SYMBICORT) 160-4.5 MCG/ACT Inhaler 7/12/2024 at PM PTA; does not have with  No Yes   Sig: INHALE 2 PUFFS INTO THE LUNGS TWICE DAILY   carbamide peroxide (DEBROX) 6.5 % otic solution Past Week  No Yes   Sig: Place 5 drops into both ears 2 times daily   diaper,brief,adult,disposable (ADULT BRIEFS - LARGE) Misc   No No   Sig: [DIAPER,BRIEF,ADULT,DISPOSABLE (ADULT BRIEFS - LARGE) MISC] Use 3-4 daily as needed for incontinence   diltiazem ER COATED BEADS (CARDIZEM CD/CARTIA XT) 120 MG 24 hr capsule 7/12/2024 at AM  No Yes   Sig: TAKE 1 CAPSULE(120 MG) BY MOUTH DAILY   empagliflozin (JARDIANCE) 10 MG TABS tablet " 7/12/2024 at AM  No Yes   Sig: Take 1 tablet (10 mg) by mouth daily   furosemide (LASIX) 20 MG tablet 7/12/2024 at AM; 40 mg  No Yes   Sig: Take 2 tablets (40 mg) by mouth daily   gabapentin (NEURONTIN) 600 MG tablet 7/12/2024 at PM PTA; 3 of 3  No Yes   Sig: TAKE 1 TABLET(600 MG) BY MOUTH THREE TIMES DAILY   generic lancets (FINGERSTIX LANCETS)   No No   Sig: [GENERIC LANCETS (FINGERSTIX LANCETS)] Dispense brand per patient's insurance at pharmacy discretion.   ipratropium - albuterol 0.5 mg/2.5 mg/3 mL (DUONEB) 0.5-2.5 (3) MG/3ML neb solution Past Week at few days ago  No Yes   Sig: INHALE 1 VIAL VIA NEBULIZER EVERY 6 HOURS AS NEEDED FOR SHORTNESS OF BREATH OR WHEEZING   meclizine (ANTIVERT) 25 MG tablet More than a month at PRN  No Yes   Sig: TAKE 1 TABLET(25 MG) BY MOUTH THREE TIMES DAILY AS NEEDED FOR DIZZINESS OR NAUSEA   metoprolol tartrate (LOPRESSOR) 25 MG tablet 7/12/2024 at PM PTA; 2 of 2  No Yes   Sig: Take 1 tablet (25 mg) by mouth 2 times daily   naloxone (NARCAN) 4 MG/0.1ML nasal spray has never used  No Yes   Sig: Spray 1 spray (4 mg) into one nostril alternating nostrils once as needed for opioid reversal every 2-3 minutes until assistance arrives   nystatin (NYSTOP) [id number] UNIT/GM external powder Past Week at PRN  No Yes   Sig: APPLY TO THE AFFECTED AREA(S) 2-3 TIMES DAILY AS NEEDED   omeprazole (PRILOSEC) 20 MG DR capsule 7/12/2024 at PM PTA  No Yes   Sig: TAKE 1 CAPSULE(20 MG) BY MOUTH TWICE DAILY   oxyCODONE IR (ROXICODONE) 10 MG tablet Past Month at few days ago, none today  No Yes   Sig: Take 1 tablet (10 mg) by mouth every 6 hours as needed for moderate to severe pain   potassium chloride ER (KLOR-CON M) 20 MEQ CR tablet 7/12/2024 at AM  No Yes   Sig: TAKE 1 TABLET(20 MEQ) BY MOUTH DAILY   sucralfate (CARAFATE) 1 GM tablet 7/12/2024 at PM PTA; 4 of 4  No Yes   Sig: CRUSH ONE TABLET AND MIX WITH A LLITTLE WATER AND SWALLOW FOUR TIMES DAILY      Facility-Administered Medications: None         Review of Systems    Review of Systems   Constitutional:  Negative for chills, fever and weight loss.   Eyes:  Negative for blurred vision.   Respiratory:  Positive for shortness of breath. Negative for cough and wheezing.    Cardiovascular:  Negative for chest pain, palpitations and orthopnea.   Gastrointestinal:  Negative for abdominal pain, heartburn, nausea and vomiting.   Genitourinary:  Negative for flank pain, frequency and urgency.   Skin:  Negative for itching and rash.   Neurological:  Negative for dizziness, tingling, seizures and weakness.   Psychiatric/Behavioral:  Negative for depression.           Physical Exam   Vital Signs: Temp: 98.6  F (37  C) Temp src: Temporal BP: 109/53 Pulse: 68   Resp: 22 SpO2: 92 % O2 Device: Nasal cannula Oxygen Delivery: 6 LPM  Weight: 145 lbs 0 oz    Physical Exam  Constitutional:       General: She is not in acute distress.     Appearance: She is ill-appearing. She is not toxic-appearing or diaphoretic.      Comments: Frail, cachectic   HENT:      Head: Normocephalic and atraumatic.      Mouth/Throat:      Mouth: Mucous membranes are moist.   Cardiovascular:      Rate and Rhythm: Normal rate and regular rhythm.      Heart sounds: No murmur heard.     No friction rub. No gallop.   Pulmonary:      Effort: No tachypnea, bradypnea, accessory muscle usage or respiratory distress.      Breath sounds: Decreased breath sounds (RUL and RLL) present. No wheezing.      Comments: On 7 L via nasal cannula  Speaking in full sentences  Abdominal:      General: Bowel sounds are normal.      Palpations: Abdomen is soft.      Tenderness: There is no abdominal tenderness.   Neurological:      General: No focal deficit present.      Mental Status: She is alert and oriented to person, place, and time.           Medical Decision Making       60 MINUTES SPENT BY ME on the date of service doing chart review, history, exam, documentation & further activities per the note.      Data     I  have personally reviewed the following data over the past 24 hrs:    4.7  \   11.5 (L)   / 125 (L)     140 100 18.9 /  188 (H)   4.1 35 (H) 0.96 (H) \     ALT: <5 AST: 21 AP: 113 TBILI: 0.4   ALB: 3.8 TOT PROTEIN: 7.2 LIPASE: N/A     Trop: 33 (H) BNP: 804     Procal: N/A CRP: 12.60 (H) Lactic Acid: 0.9       INR:  1.45 (H) PTT:  38   D-dimer:  N/A Fibrinogen:  N/A       Imaging results reviewed over the past 24 hrs:   Recent Results (from the past 24 hour(s))   CT Chest Pulmonary Embolism w Contrast    Narrative    EXAM: CT CHEST PULMONARY EMBOLISM W CONTRAST  LOCATION: [facility]  DATE: 7/12/2024    INDICATION: covid positive, hypoxia, eval for pneumonia, PE  COMPARISON: 2/6/2024  TECHNIQUE: CT chest pulmonary angiogram during arterial phase injection of IV contrast. Multiplanar reformats and MIP reconstructions were performed. Dose reduction techniques were used.   CONTRAST: 75 ML ISOVUE 370    FINDINGS:  ANGIOGRAM CHEST: No pulmonary artery embolism.    LUNGS AND PLEURA: Since February 2024, development of right upper lobe collapse with right upper lobe airway becoming completely opacified shortly after its origin. No significant change of middle and right lower lobe volume loss with patent middle and   right lower lobe airways. Moderate right pleural effusion. No pneumothorax.    MEDIASTINUM/AXILLAE: Compromised evaluation of right perihilar adenopathy from adjacent volume loss. No significant mediastinal or left perihilar adenopathy. Normal heart size. No pericardial effusion. Aortic valvular prosthetic. Dual-chamber pacer.   Patulous esophagus.    CORONARY ARTERY CALCIFICATION: Moderate.     UPPER ABDOMEN: No actionable findings.    MUSCULOSKELETAL: Bony demineralization and degenerative changes.      Impression    IMPRESSION:    1.  No pulmonary embolism.    2.  Interval complete right upper lobe collapse with opacification of the proximal right upper lobe airway of indeterminate  etiology. Recommend pulmonary follow-up and direct visualization or repeat chest CT to exclude an obstructing lesion. Retained   airway debris.    3.  No significant change of moderate right pleural effusion, of indeterminate etiology.    4.  Moderately advanced emphysema.

## 2024-07-13 NOTE — PLAN OF CARE
Problem: Gas Exchange Impaired  Goal: Optimal Gas Exchange  Intervention: Optimize Oxygenation and Ventilation  Recent Flowsheet Documentation  Taken 7/13/2024 0400 by Leah Ospina, RN  Head of Bed (HOB) Positioning: HOB at 20 degrees   Goal Outcome Evaluation:  Pt resting well at intervals. Titrating HFNC 60-75%/50L keeping sats 88-92 depending on if pt is awake or sleeping.  VSS. Denies pain. UOP per parvin. Call light in reach to make needs known.

## 2024-07-13 NOTE — PROGRESS NOTES
07/13/24 0922   Appointment Info   Signing Clinician's Name / Credentials (PT) Michelle Clay, PT   Living Environment   People in Home child(david), adult  (son and daughter in law who is PCA)   Current Living Arrangements house   Home Accessibility stairs to enter home   Number of Stairs, Main Entrance 3   Stair Railings, Main Entrance railing on right side (ascending)   Transportation Anticipated family or friend will provide   Self-Care   Regular Exercise No   Equipment Currently Used at Home   (SEC , 4ww and FWW; occasional use)   Fall history within last six months no   Activity/Exercise/Self-Care Comment indep. toileiting, otherwise help   General Information   Onset of Illness/Injury or Date of Surgery 07/12/24   Referring Physician bernadette jones   Patient/Family Therapy Goals Statement (PT) feel better/do more   Pertinent History of Current Problem (include personal factors and/or comorbidities that impact the POC) reports hx of vertigo intermittently   Existing Precautions/Restrictions   (on 45% O2, HFNC; IV and Tele, covid)   Cognition   Orientation Status (Cognition) oriented x 4   Pain Assessment   Patient Currently in Pain No   Integumentary/Edema   Integumentary/Edema   (extensive bruising on UE's; R lateral malleolus red)   Posture    Posture Comments fwd head   Range of Motion (ROM)   ROM Comment R shoulder approx. 90 degr. flex.      R hip IR 0; bilat DF's 0   Strength (Manual Muscle Testing)   Strength Comments LE's fair   Bed Mobility   Comment, (Bed Mobility) sit<>sup. slow/incr. effort but no help   Transfers   Impairments Contributing to Impaired Transfers impaired balance;decreased ROM   Balance   Balance Comments legs leaning against bed if no AD   Sensory Examination   Sensory Perception Comments reports hx intermittent numbness/tingling in feet   Clinical Impression   Criteria for Skilled Therapeutic Intervention Yes, treatment indicated   PT Diagnosis (PT) impaired functional mobility    Influenced by the following impairments general weakness, poor activity tolerance, arthritis/fibromyalgia, limted ROM, imbalance   Functional limitations due to impairments trans/amb.   Clinical Presentation (PT Evaluation Complexity) evolving   Clinical Presentation Rationale presents as medically diagnosed   Clinical Decision Making (Complexity) moderate complexity   Planned Therapy Interventions (PT) gait training;balance training;patient/family education;stair training;transfer training;strengthening   Risk & Benefits of therapy have been explained patient   PT Total Evaluation Time   PT Eval, Moderate Complexity Minutes (69427) 25   Physical Therapy Goals   PT Frequency Daily   PT Predicted Duration/Target Date for Goal Attainment 07/18/24   PT Goals Transfers;Gait;Stairs   PT: Transfers Modified independent;Assistive device   PT: Gait Supervision/stand-by assist;Assistive device;50 feet   PT: Stairs Supervision/stand-by assist;3 stairs;Rail on right   Interventions   Interventions Quick Adds Therapeutic Activity   Therapeutic Activity   Therapeutic Activities: dynamic activities to improve functional performance Minutes (00656) 15   Treatment Detail/Skilled Intervention with HOB elevated, able to move to EOB using rail/sba. Sat unsuppported easily, Sit.stand without fww min assist/legs against bed (poor balance). Stood in place about 1 min. O2 sat low-mid 90's.Second trial, stood for about 4 min, including stepping in place and a few steps fwd and backward. All cga with fww.  Sit>supine incr. effort but no assist.   PT Discharge Planning   PT Plan basic mobility and activity tolerance, monitoring O2   PT Discharge Recommendation (DC Rec) home with assist   PT Rationale for DC Rec anticipate household mobility will be adequate   PT Brief overview of current status stood/few steps using fww, cga   PT Equipment Needed at Discharge walker, rolling   Total Session Time   Timed Code Treatment Minutes 15   Total  Session Time (sum of timed and untimed services) 40

## 2024-07-14 ENCOUNTER — APPOINTMENT (OUTPATIENT)
Dept: RADIOLOGY | Facility: CLINIC | Age: 74
DRG: 177 | End: 2024-07-14
Attending: HOSPITALIST
Payer: COMMERCIAL

## 2024-07-14 LAB
ANION GAP SERPL CALCULATED.3IONS-SCNC: 6 MMOL/L (ref 7–15)
BUN SERPL-MCNC: 29 MG/DL (ref 8–23)
CALCIUM SERPL-MCNC: 8.8 MG/DL (ref 8.8–10.2)
CHLORIDE SERPL-SCNC: 103 MMOL/L (ref 98–107)
CREAT SERPL-MCNC: 0.79 MG/DL (ref 0.51–0.95)
DEPRECATED HCO3 PLAS-SCNC: 32 MMOL/L (ref 22–29)
EGFRCR SERPLBLD CKD-EPI 2021: 78 ML/MIN/1.73M2
GLUCOSE BLDC GLUCOMTR-MCNC: 121 MG/DL (ref 70–99)
GLUCOSE BLDC GLUCOMTR-MCNC: 122 MG/DL (ref 70–99)
GLUCOSE BLDC GLUCOMTR-MCNC: 127 MG/DL (ref 70–99)
GLUCOSE BLDC GLUCOMTR-MCNC: 140 MG/DL (ref 70–99)
GLUCOSE BLDC GLUCOMTR-MCNC: 241 MG/DL (ref 70–99)
GLUCOSE SERPL-MCNC: 100 MG/DL (ref 70–99)
HOLD SPECIMEN: NORMAL
PHOSPHATE SERPL-MCNC: 3.6 MG/DL (ref 2.5–4.5)
POTASSIUM SERPL-SCNC: 4.8 MMOL/L (ref 3.4–5.3)
SODIUM SERPL-SCNC: 141 MMOL/L (ref 135–145)

## 2024-07-14 PROCEDURE — 36415 COLL VENOUS BLD VENIPUNCTURE: CPT | Performed by: HOSPITALIST

## 2024-07-14 PROCEDURE — 250N000009 HC RX 250: Performed by: INTERNAL MEDICINE

## 2024-07-14 PROCEDURE — 999N000287 HC ICU ADULT ROUNDING, EACH 10 MINS

## 2024-07-14 PROCEDURE — 99233 SBSQ HOSP IP/OBS HIGH 50: CPT | Performed by: HOSPITALIST

## 2024-07-14 PROCEDURE — 250N000013 HC RX MED GY IP 250 OP 250 PS 637: Performed by: INTERNAL MEDICINE

## 2024-07-14 PROCEDURE — 80048 BASIC METABOLIC PNL TOTAL CA: CPT | Performed by: HOSPITALIST

## 2024-07-14 PROCEDURE — 94640 AIRWAY INHALATION TREATMENT: CPT

## 2024-07-14 PROCEDURE — 250N000012 HC RX MED GY IP 250 OP 636 PS 637

## 2024-07-14 PROCEDURE — 71045 X-RAY EXAM CHEST 1 VIEW: CPT

## 2024-07-14 PROCEDURE — 250N000011 HC RX IP 250 OP 636: Performed by: HOSPITALIST

## 2024-07-14 PROCEDURE — 250N000011 HC RX IP 250 OP 636: Performed by: INTERNAL MEDICINE

## 2024-07-14 PROCEDURE — 250N000013 HC RX MED GY IP 250 OP 250 PS 637

## 2024-07-14 PROCEDURE — 999N000215 HC STATISTIC HFNC ADULT NON-CPAP

## 2024-07-14 PROCEDURE — 258N000003 HC RX IP 258 OP 636: Performed by: HOSPITALIST

## 2024-07-14 PROCEDURE — 84100 ASSAY OF PHOSPHORUS: CPT | Performed by: HOSPITALIST

## 2024-07-14 PROCEDURE — 94799 UNLISTED PULMONARY SVC/PX: CPT

## 2024-07-14 PROCEDURE — 94640 AIRWAY INHALATION TREATMENT: CPT | Mod: 76

## 2024-07-14 PROCEDURE — XW033E5 INTRODUCTION OF REMDESIVIR ANTI-INFECTIVE INTO PERIPHERAL VEIN, PERCUTANEOUS APPROACH, NEW TECHNOLOGY GROUP 5: ICD-10-PCS | Performed by: INTERNAL MEDICINE

## 2024-07-14 PROCEDURE — 120N000004 HC R&B MS OVERFLOW

## 2024-07-14 PROCEDURE — 99233 SBSQ HOSP IP/OBS HIGH 50: CPT | Performed by: INTERNAL MEDICINE

## 2024-07-14 PROCEDURE — 94660 CPAP INITIATION&MGMT: CPT

## 2024-07-14 PROCEDURE — 999N000157 HC STATISTIC RCP TIME EA 10 MIN

## 2024-07-14 RX ORDER — CEFTRIAXONE 1 G/1
1 INJECTION, POWDER, FOR SOLUTION INTRAMUSCULAR; INTRAVENOUS EVERY 24 HOURS
Status: DISCONTINUED | OUTPATIENT
Start: 2024-07-14 | End: 2024-07-17

## 2024-07-14 RX ADMIN — PANTOPRAZOLE SODIUM 40 MG: 40 TABLET, DELAYED RELEASE ORAL at 21:30

## 2024-07-14 RX ADMIN — ACETYLCYSTEINE 2 ML: 200 SOLUTION ORAL; RESPIRATORY (INHALATION) at 07:53

## 2024-07-14 RX ADMIN — GUAIFENESIN 600 MG: 600 TABLET ORAL at 21:29

## 2024-07-14 RX ADMIN — ACETYLCYSTEINE 2 ML: 200 SOLUTION ORAL; RESPIRATORY (INHALATION) at 19:14

## 2024-07-14 RX ADMIN — METOPROLOL TARTRATE 25 MG: 25 TABLET, FILM COATED ORAL at 09:33

## 2024-07-14 RX ADMIN — SUCRALFATE 1 G: 1 TABLET ORAL at 12:50

## 2024-07-14 RX ADMIN — IPRATROPIUM BROMIDE AND ALBUTEROL SULFATE 3 ML: .5; 3 SOLUTION RESPIRATORY (INHALATION) at 16:32

## 2024-07-14 RX ADMIN — DEXAMETHASONE 6 MG: 2 TABLET ORAL at 12:51

## 2024-07-14 RX ADMIN — FUROSEMIDE 40 MG: 40 TABLET ORAL at 09:34

## 2024-07-14 RX ADMIN — GUAIFENESIN 600 MG: 600 TABLET ORAL at 09:33

## 2024-07-14 RX ADMIN — REMDESIVIR 100 MG: 100 INJECTION, POWDER, LYOPHILIZED, FOR SOLUTION INTRAVENOUS at 21:39

## 2024-07-14 RX ADMIN — SUCRALFATE 1 G: 1 TABLET ORAL at 21:29

## 2024-07-14 RX ADMIN — APIXABAN 5 MG: 5 TABLET, FILM COATED ORAL at 21:29

## 2024-07-14 RX ADMIN — ACETYLCYSTEINE 2 ML: 200 SOLUTION ORAL; RESPIRATORY (INHALATION) at 16:32

## 2024-07-14 RX ADMIN — SODIUM CHLORIDE 50 ML: 9 INJECTION, SOLUTION INTRAVENOUS at 22:23

## 2024-07-14 RX ADMIN — IPRATROPIUM BROMIDE AND ALBUTEROL SULFATE 3 ML: .5; 3 SOLUTION RESPIRATORY (INHALATION) at 07:53

## 2024-07-14 RX ADMIN — ACETYLCYSTEINE 2 ML: 200 SOLUTION ORAL; RESPIRATORY (INHALATION) at 12:07

## 2024-07-14 RX ADMIN — SUCRALFATE 1 G: 1 TABLET ORAL at 16:13

## 2024-07-14 RX ADMIN — CEFTRIAXONE 1 G: 1 INJECTION, POWDER, FOR SOLUTION INTRAMUSCULAR; INTRAVENOUS at 18:23

## 2024-07-14 RX ADMIN — SODIUM CHLORIDE 50 ML: 9 INJECTION, SOLUTION INTRAVENOUS at 01:40

## 2024-07-14 RX ADMIN — PANTOPRAZOLE SODIUM 40 MG: 40 TABLET, DELAYED RELEASE ORAL at 09:33

## 2024-07-14 RX ADMIN — DILTIAZEM HYDROCHLORIDE 120 MG: 120 CAPSULE, COATED, EXTENDED RELEASE ORAL at 09:34

## 2024-07-14 RX ADMIN — APIXABAN 5 MG: 5 TABLET, FILM COATED ORAL at 09:34

## 2024-07-14 RX ADMIN — IPRATROPIUM BROMIDE AND ALBUTEROL SULFATE 3 ML: .5; 3 SOLUTION RESPIRATORY (INHALATION) at 12:07

## 2024-07-14 RX ADMIN — IPRATROPIUM BROMIDE AND ALBUTEROL SULFATE 3 ML: .5; 3 SOLUTION RESPIRATORY (INHALATION) at 19:13

## 2024-07-14 RX ADMIN — GABAPENTIN 600 MG: 600 TABLET, FILM COATED ORAL at 16:14

## 2024-07-14 RX ADMIN — METOPROLOL TARTRATE 25 MG: 25 TABLET, FILM COATED ORAL at 21:29

## 2024-07-14 RX ADMIN — GABAPENTIN 600 MG: 600 TABLET, FILM COATED ORAL at 09:34

## 2024-07-14 RX ADMIN — REMDESIVIR 100 MG: 100 INJECTION, POWDER, LYOPHILIZED, FOR SOLUTION INTRAVENOUS at 01:00

## 2024-07-14 RX ADMIN — SUCRALFATE 1 G: 1 TABLET ORAL at 09:33

## 2024-07-14 RX ADMIN — SENNOSIDES AND DOCUSATE SODIUM 1 TABLET: 50; 8.6 TABLET ORAL at 21:29

## 2024-07-14 RX ADMIN — GABAPENTIN 600 MG: 600 TABLET, FILM COATED ORAL at 21:29

## 2024-07-14 RX ADMIN — ATORVASTATIN CALCIUM 20 MG: 10 TABLET, FILM COATED ORAL at 21:29

## 2024-07-14 ASSESSMENT — ACTIVITIES OF DAILY LIVING (ADL)
ADLS_ACUITY_SCORE: 29
ADLS_ACUITY_SCORE: 25
ADLS_ACUITY_SCORE: 29
ADLS_ACUITY_SCORE: 29
ADLS_ACUITY_SCORE: 25
ADLS_ACUITY_SCORE: 29
ADLS_ACUITY_SCORE: 29
ADLS_ACUITY_SCORE: 25
ADLS_ACUITY_SCORE: 29
ADLS_ACUITY_SCORE: 29
ADLS_ACUITY_SCORE: 25
ADLS_ACUITY_SCORE: 29

## 2024-07-14 NOTE — PROGRESS NOTES
PULMONARY / CRITICAL CARE PROGRESS NOTE    Date / Time of Admission:  7/12/2024  7:58 PM    Assessment:   Principal Problem:    Acute on chronic respiratory failure with hypoxemia (H)  Active Problems:    Nicotine Dependence    Fibromyalgia    Atrial fibrillation, unspecified type (H)    Acute on chronic respiratory failure with hypoxia (H)    Aortic stenosis, severe    Pneumonia due to 2019 novel coronavirus      Code Status:  Full Code    74yoF with emphysema on home O2, afib on Eliquis here with hypoxia in setting of RUL collapse and chronic moderate right pleural effusion.     Plan:   Pulmonary:  1) Chronic moderate right-sided pleural effusion  2) Underlying emphysema  3) Acute on chronic hypoxic respiratory failure  4) RUL collapse  -Bronchial hygiene with Volera. CXR looks a bit worse this morning but oxygenation significantly improved now down to 6L  -Duoneb and mucomyst  -Consider bronchoscopy if this doesn't resolve in the RUL  -Thoracentesis tomorrow  -Down to low-flow     C/V:  1) Afib  2) Hypertension  -Continue home diltiazem, lopressor  -Continue home oral lasix, can consider IV dose if hypoxia worsens.   -Continue Apixaban     ID:  1) COVID infection  2) E.Coli pneumonia  -Remdesivir  -Decadron  -Ceftriaxone, plan for 5-7 days of antibiotics total.      Heme;  1) Leukopenia in setting of COVID infection  -Monitor           ICU DAILY CHECKLIST                           Can patient transfer out of MICU? Yes    FAST HUG:    Feeding:  Feeding: Yes.  Patient is receiving ORAL    Chairez:Yes  Analgesia/Sedation:No  Thromboembolic prophylaxis: Yes; Mode:   DOAC  HOB>30:  Yes  Stress Ulcer Protocol Active: Yes; Mode: PPI  Glycemic Control: Any glucose > 180 no; Mode of Insulin Therapy: Sliding Scale Insulin    Clinically Significant Risk Factors               # Coagulation Defect: INR = 1.27 (Ref range: 0.85 - 1.15) and/or PTT = 38 Seconds (Ref range: 22 - 38 Seconds), will monitor for bleeding  #  Thrombocytopenia: Lowest platelets = 107 in last 2 days, will monitor for bleeding   # Hypertension: Noted on problem list                  # Financial/Environmental Concerns: none   # Pacemaker present           Subjective:   HPI:  Mary Kay Tejada is a 74 year old female with history of emphysema on home O2, DMII afib on anticoagulation who presents with hypoxia found to be COVID +. Reports fever and chills at home.     She was initiated on remdesivir and decadron in ED but overnight worsening hypoxia requiring high-flow.     Principal Problem:    Acute on chronic respiratory failure with hypoxemia (H)  Active Problems:    Nicotine Dependence    Fibromyalgia    Atrial fibrillation, unspecified type (H)    Acute on chronic respiratory failure with hypoxia (H)    Aortic stenosis, severe    Pneumonia due to 2019 novel coronavirus      Allergies: Celebrex [celecoxib] and Latex     MEDS:  Scheduled Meds:  Current Facility-Administered Medications   Medication Dose Route Frequency Provider Last Rate Last Admin    acetylcysteine (MUCOMYST) 20 % nebulizer solution 2 mL  2 mL Nebulization 4x Daily Carmelita Buckley MD   2 mL at 07/14/24 0753    apixaban ANTICOAGULANT (ELIQUIS) tablet 5 mg  5 mg Oral BID Bridgett Vazquez PA-C   5 mg at 07/13/24 2218    atorvastatin (LIPITOR) tablet 20 mg  20 mg Oral At Bedtime Bridgett Vazquez PA-C   20 mg at 07/13/24 2219    [Held by provider] carbamide peroxide (DEBROX) 6.5 % otic solution 5 drop  5 drop Both Ears BID Bridgett Vazquez PA-C        dexAMETHasone (DECADRON) tablet 6 mg  6 mg Oral Daily Bridgett Vazquez PA-C   6 mg at 07/13/24 1319    diltiazem ER COATED BEADS (CARDIZEM CD/CARTIA XT) 24 hr capsule 120 mg  120 mg Oral Daily Lisette Boucher MD        furosemide (LASIX) tablet 40 mg  40 mg Oral Daily Bridgett Vazquez PA-C   40 mg at 07/13/24 0907    gabapentin (NEURONTIN) tablet 600 mg  600 mg Oral TID Bridgett Vazquez PA-C   600 mg at 07/13/24 2219     guaiFENesin (MUCINEX) 12 hr tablet 600 mg  600 mg Oral BID Bridgett Vazquez PA-C   600 mg at 07/13/24 2219    insulin aspart (NovoLOG) injection (RAPID ACTING)  1-3 Units Subcutaneous 4x Daily AC & HS Johnny Arellano MD        ipratropium - albuterol 0.5 mg/2.5 mg/3 mL (DUONEB) neb solution 3 mL  3 mL Nebulization 4x daily Carmelita Buckley MD   3 mL at 07/14/24 0753    metoprolol tartrate (LOPRESSOR) tablet 25 mg  25 mg Oral BID Bridgett Vazquez PA-C   25 mg at 07/13/24 2220    pantoprazole (PROTONIX) EC tablet 40 mg  40 mg Oral BID Bridgett Vazquez PA-C   40 mg at 07/13/24 2219    remdesivir 100 mg in sodium chloride 0.9 % 250 mL intermittent infusion  100 mg Intravenous Q24H Johnny Arellano  mL/hr at 07/14/24 0100 100 mg at 07/14/24 0100    And    sodium chloride 0.9% BOLUS 50 mL  50 mL Intravenous Q24H Johnny Arellano  mL/hr at 07/14/24 0140 50 mL at 07/14/24 0140    sodium chloride (PF) 0.9% PF flush 3 mL  3 mL Intracatheter Q8H Bridgett Vazquez PA-C   3 mL at 07/13/24 1721    sucralfate (CARAFATE) tablet 1 g  1 g Oral 4x Daily AC & HS Bridgett Vazquez PA-C   1 g at 07/13/24 2218     Continuous Infusions:  Current Facility-Administered Medications   Medication Dose Route Frequency Provider Last Rate Last Admin    Patient is already receiving anticoagulation with heparin, enoxaparin (LOVENOX), warfarin (COUMADIN)  or other anticoagulant medication   Does not apply Continuous PRN Bridgett Vazquez PA-C         PRN Meds:.  Current Facility-Administered Medications   Medication Dose Route Frequency Provider Last Rate Last Admin    acetaminophen (TYLENOL) tablet 650 mg  650 mg Oral Q4H PRN Bridgett Vazquez PA-C        Or    acetaminophen (TYLENOL) Suppository 650 mg  650 mg Rectal Q4H PRN Bridgett Vazquez PA-C        albuterol (PROVENTIL HFA/VENTOLIN HFA) inhaler  2 puff Inhalation Q4H PRN Bridgett Vazquez PA-C        calcium carbonate (TUMS) chewable tablet  "1,000 mg  1,000 mg Oral 4x Daily PRN Bridgett Vazquez PA-C        glucose gel 15-30 g  15-30 g Oral Q15 Min PRN Bridgett Vazquez PA-C        Or    dextrose 50 % injection 25-50 mL  25-50 mL Intravenous Q15 Min PRN Bridgett Vazquez PA-C        Or    glucagon injection 1 mg  1 mg Subcutaneous Q15 Min PRN Bridgett Vazquez PA-C        ipratropium - albuterol 0.5 mg/2.5 mg/3 mL (DUONEB) neb solution 3 mL  3 mL Nebulization Q4H PRN Lisette Boucher MD        lidocaine (LMX4) cream   Topical Q1H PRN Bridgett Vazquez PA-C        lidocaine 1 % 0.1-1 mL  0.1-1 mL Other Q1H PRN Bridgett Vazquez PA-C        naloxone (NARCAN) nasal spray 4 mg  4 mg Alternating Nostrils Once PRN Bridgett Vazquez PA-C        ondansetron (ZOFRAN ODT) ODT tab 4 mg  4 mg Oral Q6H PRN Bridgett Vazquez PA-C        Or    ondansetron (ZOFRAN) injection 4 mg  4 mg Intravenous Q6H PRN Bridgett Vazquez PA-C        oxyCODONE (ROXICODONE) tablet 10 mg  10 mg Oral Q6H PRN Bridgett Vazquez PA-C        Patient is already receiving anticoagulation with heparin, enoxaparin (LOVENOX), warfarin (COUMADIN)  or other anticoagulant medication   Does not apply Continuous PRN Bridgett Vazquez PA-C        polyethylene glycol (MIRALAX) Packet 17 g  17 g Oral BID PRN Bridgett Vazquez PA-C        senna-docusate (SENOKOT-S/PERICOLACE) 8.6-50 MG per tablet 1 tablet  1 tablet Oral BID PRN Bridgett Vazquez PA-C        Or    senna-docusate (SENOKOT-S/PERICOLACE) 8.6-50 MG per tablet 2 tablet  2 tablet Oral BID PRN Bridgett Vazquez PA-C        sodium chloride (PF) 0.9% PF flush 3 mL  3 mL Intracatheter q1 min prn Bridgett Vazquez PA-C             Objective:   VITALS:  /62 (BP Location: Left arm)   Pulse 63   Temp 97.9  F (36.6  C) (Oral)   Resp 20   Ht 1.651 m (5' 5\")   Wt 65.5 kg (144 lb 6.4 oz)   LMP  (LMP Unknown)   SpO2 96%   BMI 24.03 kg/m    EXAM:  General appearance: alert, appears " stated age, and cooperative  Neck: no adenopathy and supple, symmetrical, trachea midline  Lungs: clear to auscultation bilaterally  Heart: irregularly irregular  Abdomen: soft, non-tender; bowel sounds normal; no masses,  no organomegaly  Extremities: no edema  Skin: Skin color, texture, turgor normal. No rashes or lesions  Neurologic: Grossly normal    I&O:     Intake/Output Summary (Last 24 hours) at 7/14/2024 0834  Last data filed at 7/14/2024 0700  Gross per 24 hour   Intake 1555 ml   Output 1000 ml   Net 555 ml           Data Review:  Chest Xray  Personally reviewed image/s and Personally reviewed impression/s  EXAM: XR CHEST PORT 1 VIEW  LOCATION: [facility]  DATE: 7/14/2024     INDICATION: Evaluate partial collapse  COMPARISON: 7/13/2024     IMPRESSION: Increased atelectasis of the RIGHT lung with rightward shift of the mediastinum. Small right pleural effusion. Clear left lung. Left subclavian approach pacing device. Cardiac silhouette within normal limits. Prosthetic aortic valve.    LABS      Latest Ref Rng & Units 7/12/2024     8:30 PM 7/13/2024     6:29 AM   Glucose Values   Glucose 70 - 99 mg/dL 188  161        Results for orders placed or performed in visit on 05/22/24   Basic metabolic panel   Result Value Ref Range    Sodium 143 135 - 145 mmol/L    Potassium 4.2 3.4 - 5.3 mmol/L    Chloride 101 98 - 107 mmol/L    Carbon Dioxide (CO2) 35 (H) 22 - 29 mmol/L    Anion Gap 7 7 - 15 mmol/L    Urea Nitrogen 15.5 8.0 - 23.0 mg/dL    Creatinine 0.77 0.51 - 0.95 mg/dL    GFR Estimate 80 >60 mL/min/1.73m2    Calcium 9.4 8.8 - 10.2 mg/dL    Glucose 95 70 - 99 mg/dL     Lab Results   Component Value Date    WBC 3.0 (L) 07/13/2024    HGB 10.9 (L) 07/13/2024    HCT 37.5 07/13/2024    MCV 85 07/13/2024     (L) 07/13/2024       ABG:  Recent Labs   Lab 07/13/24  0316 07/12/24 2030   O2PER 0 36             By:  Carmelita Buckley MD  Pulmonary/Critical Care

## 2024-07-14 NOTE — PROGRESS NOTES
Patient did not tolerate FIO2 wean. Increase work of breath not noted. Remains on high flow 45 lpm/55%. Tolerated Oscillating Lung Expansion Therapy. Mucomyst and Duoneb given. Deep breath and coughing encouraged.     Blair Cleveland, RT

## 2024-07-14 NOTE — PLAN OF CARE
Problem: Gas Exchange Impaired  Goal: Optimal Gas Exchange  Outcome: Progressing   Goal Outcome Evaluation:      Plan of Care Reviewed With: patient    Overall Patient Progress: improvingOverall Patient Progress: improving    Patient  is alert and oriented. Pt denies pain or SOB. Pt remains on HFNC, O2 saturations 88-92%. Telemetry discontinued. Pt using purewick with good UOP. Writer attempted to place another PIV when when access was lost, was unsuccessful ad caused a hematoma is the left forearm. Pressure was applied and arm elevated. Pt declined ice.       Keiry Fox RN

## 2024-07-14 NOTE — PROGRESS NOTES
Care Management Follow Up    Length of Stay (days): 2    Expected Discharge Date: 07/17/2024     Concerns to be Addressed: discharge planning     Patient plan of care discussed at interdisciplinary rounds: Yes    Anticipated Discharge Disposition: Home     Anticipated Discharge Services: PCA  Anticipated Discharge DME: Oxygen    Patient/family educated on Medicare website which has current facility and service quality ratings: no  Education Provided on the Discharge Plan: Yes  Patient/Family in Agreement with the Plan: yes    Referrals Placed by CM/SW:    Private pay costs discussed: Not applicable    Additional Information:  No further care management intervention anticipated at this time.  Please re-consult if further needs arise.  Care management signing off.        Santiago Lucio RN

## 2024-07-14 NOTE — PROGRESS NOTES
Pt has slept well this shift. Sitting at edge of bed and stood at bedside to stretch out hip, tolerating activity well w/o CONTE. Denies pain. On HFNC keeping sats>92%. Purewick did not work well and bed saturated this am. Taking fluids well. Call light in reach to make needs known.

## 2024-07-14 NOTE — PROGRESS NOTES
Patient weaned off HFNC to 5L via NC.   She is getting Duoneb, Mucomyst, and volera treatment QID. Pt is tolerating the volera really well, good productive strong cough. Sitting up in chair and ambulating in room.   RT will continue to monitor    Carmita Rome RT

## 2024-07-14 NOTE — PROGRESS NOTES
[facility]    Medicine Progress Note - Hospitalist Service    Date of Admission:  7/12/2024    Assessment & Plan   Mary Kay Tejada is a 74 year old female admitted with acute on chronic hypoxic and hypercarbic respiratory failure in the setting of covid19.  Oxygen requirement is overall stable.  Repeat CXR to assess partial right lung collapse.  Continue remdesivir, dexamethasone.    # Acute on chronic hypoxic and hypercarbic respiratory failure 2/2 covid19  # Partial right lung collapse  # Pleural effusion  - dexamethasone  - remdesivir  - CXR    # DM  - ISS    # Hx severe aortic stenosis s/p TAVR  # HFpEF  # Afib  - furosemide  - apixaban  - diltiazem, metoprolol          Diet: Fluid restriction 1800 ML FLUID  Regular Diet Adult      Chairez Catheter: Not present  Lines: None     Cardiac Monitoring: None  Code Status: Full Code      Clinically Significant Risk Factors               # Coagulation Defect: INR = 1.27 (Ref range: 0.85 - 1.15) and/or PTT = 38 Seconds (Ref range: 22 - 38 Seconds), will monitor for bleeding  # Thrombocytopenia: Lowest platelets = 107 in last 2 days, will monitor for bleeding   # Hypertension: Noted on problem list                  # Financial/Environmental Concerns: none   # Pacemaker present       Disposition Plan     Medically Ready for Discharge: Anticipated in 5+ Days             Johnny Arellano MD  Hospitalist Service  [facility]  Securely message with [employer] (more info)  Text page via [employer] Paging/Directory   ______________________________________________________________________    Interval History   Breathing is better today.  Denies chest pain/pressure.  Reports sensation of needing to move her bowels.    Physical Exam   Vital Signs: Temp: 97.9  F (36.6  C) Temp src: Oral BP: 123/62 Pulse: 63   Resp: 20 SpO2: 96 % O2 Device: High Flow Nasal Cannula (HFNC) Oxygen Delivery: 45 LPM  Weight: 144 lbs 6.4 oz    Gen:  sitting in bed in  no extremis  Neuro: alert, conversant  Pulm: no acute resp distress, decreased breath sounds on right posteriorly  GI:  abdomen NTTP  Ext:  mild edema in LLE    Medical Decision Making             Data

## 2024-07-15 ENCOUNTER — APPOINTMENT (OUTPATIENT)
Dept: RADIOLOGY | Facility: CLINIC | Age: 74
DRG: 177 | End: 2024-07-15
Attending: INTERNAL MEDICINE
Payer: COMMERCIAL

## 2024-07-15 ENCOUNTER — APPOINTMENT (OUTPATIENT)
Dept: ULTRASOUND IMAGING | Facility: CLINIC | Age: 74
DRG: 177 | End: 2024-07-15
Attending: RADIOLOGY
Payer: COMMERCIAL

## 2024-07-15 LAB
APPEARANCE FLD: ABNORMAL
BACTERIA SPT CULT: ABNORMAL
BACTERIA SPT CULT: ABNORMAL
CELL COUNT BODY FLUID SOURCE: ABNORMAL
COLOR FLD: ABNORMAL
ERYTHROCYTE [DISTWIDTH] IN BLOOD BY AUTOMATED COUNT: 15.6 % (ref 10–15)
GLUCOSE BLDC GLUCOMTR-MCNC: 148 MG/DL (ref 70–99)
GLUCOSE BLDC GLUCOMTR-MCNC: 219 MG/DL (ref 70–99)
GLUCOSE BLDC GLUCOMTR-MCNC: 81 MG/DL (ref 70–99)
GLUCOSE BLDC GLUCOMTR-MCNC: 87 MG/DL (ref 70–99)
GLUCOSE BLDC GLUCOMTR-MCNC: 91 MG/DL (ref 70–99)
GLUCOSE BODY FLUID SOURCE: NORMAL
GLUCOSE FLD-MCNC: 97 MG/DL
GRAM STAIN RESULT: ABNORMAL
HCT VFR BLD AUTO: 35.6 % (ref 35–47)
HGB BLD-MCNC: 10.5 G/DL (ref 11.7–15.7)
LD BODY BODY FLUID SOURCE: NORMAL
LDH FLD L TO P-CCNC: 139 U/L
LDH SERPL L TO P-CCNC: 279 U/L (ref 0–250)
LYMPHOCYTES NFR FLD MANUAL: 49 %
MCH RBC QN AUTO: 24.9 PG (ref 26.5–33)
MCHC RBC AUTO-ENTMCNC: 29.5 G/DL (ref 31.5–36.5)
MCV RBC AUTO: 84 FL (ref 78–100)
MONOS+MACROS NFR FLD MANUAL: 33 %
NEUTS BAND NFR FLD MANUAL: 18 %
PLATELET # BLD AUTO: 141 10E3/UL (ref 150–450)
PROT FLD-MCNC: 4 G/DL
PROT SERPL-MCNC: 7 G/DL (ref 6.4–8.3)
PROTEIN BODY FLUID SOURCE: NORMAL
RBC # BLD AUTO: 4.22 10E6/UL (ref 3.8–5.2)
WBC # BLD AUTO: 5.8 10E3/UL (ref 4–11)
WBC # FLD AUTO: 901 /UL

## 2024-07-15 PROCEDURE — 71045 X-RAY EXAM CHEST 1 VIEW: CPT | Mod: 77

## 2024-07-15 PROCEDURE — 84155 ASSAY OF PROTEIN SERUM: CPT | Performed by: INTERNAL MEDICINE

## 2024-07-15 PROCEDURE — 94640 AIRWAY INHALATION TREATMENT: CPT

## 2024-07-15 PROCEDURE — 99253 IP/OBS CNSLTJ NEW/EST LOW 45: CPT | Performed by: SURGERY

## 2024-07-15 PROCEDURE — 250N000013 HC RX MED GY IP 250 OP 250 PS 637: Performed by: HOSPITALIST

## 2024-07-15 PROCEDURE — 250N000009 HC RX 250: Performed by: INTERNAL MEDICINE

## 2024-07-15 PROCEDURE — 99232 SBSQ HOSP IP/OBS MODERATE 35: CPT | Performed by: INTERNAL MEDICINE

## 2024-07-15 PROCEDURE — 272N000042 US THORACENTESIS

## 2024-07-15 PROCEDURE — 82945 GLUCOSE OTHER FLUID: CPT | Performed by: INTERNAL MEDICINE

## 2024-07-15 PROCEDURE — 258N000003 HC RX IP 258 OP 636: Performed by: HOSPITALIST

## 2024-07-15 PROCEDURE — 250N000011 HC RX IP 250 OP 636: Performed by: HOSPITALIST

## 2024-07-15 PROCEDURE — 89050 BODY FLUID CELL COUNT: CPT | Performed by: INTERNAL MEDICINE

## 2024-07-15 PROCEDURE — 0W993ZZ DRAINAGE OF RIGHT PLEURAL CAVITY, PERCUTANEOUS APPROACH: ICD-10-PCS | Performed by: RADIOLOGY

## 2024-07-15 PROCEDURE — 85027 COMPLETE CBC AUTOMATED: CPT | Performed by: INTERNAL MEDICINE

## 2024-07-15 PROCEDURE — 87205 SMEAR GRAM STAIN: CPT | Performed by: INTERNAL MEDICINE

## 2024-07-15 PROCEDURE — 94640 AIRWAY INHALATION TREATMENT: CPT | Mod: 76

## 2024-07-15 PROCEDURE — 250N000013 HC RX MED GY IP 250 OP 250 PS 637

## 2024-07-15 PROCEDURE — 71045 X-RAY EXAM CHEST 1 VIEW: CPT

## 2024-07-15 PROCEDURE — 99233 SBSQ HOSP IP/OBS HIGH 50: CPT | Performed by: HOSPITALIST

## 2024-07-15 PROCEDURE — 94799 UNLISTED PULMONARY SVC/PX: CPT

## 2024-07-15 PROCEDURE — 120N000004 HC R&B MS OVERFLOW

## 2024-07-15 PROCEDURE — 94660 CPAP INITIATION&MGMT: CPT

## 2024-07-15 PROCEDURE — 83615 LACTATE (LD) (LDH) ENZYME: CPT | Performed by: INTERNAL MEDICINE

## 2024-07-15 PROCEDURE — 84157 ASSAY OF PROTEIN OTHER: CPT | Performed by: INTERNAL MEDICINE

## 2024-07-15 PROCEDURE — 250N000011 HC RX IP 250 OP 636: Performed by: INTERNAL MEDICINE

## 2024-07-15 PROCEDURE — 250N000012 HC RX MED GY IP 250 OP 636 PS 637

## 2024-07-15 PROCEDURE — 999N000157 HC STATISTIC RCP TIME EA 10 MIN

## 2024-07-15 PROCEDURE — 36415 COLL VENOUS BLD VENIPUNCTURE: CPT | Performed by: INTERNAL MEDICINE

## 2024-07-15 RX ORDER — SENNOSIDES 8.6 MG
8.6 TABLET ORAL 2 TIMES DAILY
Status: DISCONTINUED | OUTPATIENT
Start: 2024-07-15 | End: 2024-07-17 | Stop reason: HOSPADM

## 2024-07-15 RX ORDER — POLYETHYLENE GLYCOL 3350 17 G/17G
17 POWDER, FOR SOLUTION ORAL 2 TIMES DAILY
Status: DISCONTINUED | OUTPATIENT
Start: 2024-07-15 | End: 2024-07-17 | Stop reason: HOSPADM

## 2024-07-15 RX ORDER — BISACODYL 10 MG
10 SUPPOSITORY, RECTAL RECTAL ONCE
Status: COMPLETED | OUTPATIENT
Start: 2024-07-15 | End: 2024-07-15

## 2024-07-15 RX ADMIN — IPRATROPIUM BROMIDE AND ALBUTEROL SULFATE 3 ML: .5; 3 SOLUTION RESPIRATORY (INHALATION) at 16:34

## 2024-07-15 RX ADMIN — SODIUM CHLORIDE 50 ML: 9 INJECTION, SOLUTION INTRAVENOUS at 21:06

## 2024-07-15 RX ADMIN — ACETYLCYSTEINE 2 ML: 200 SOLUTION ORAL; RESPIRATORY (INHALATION) at 16:34

## 2024-07-15 RX ADMIN — GUAIFENESIN 600 MG: 600 TABLET ORAL at 20:53

## 2024-07-15 RX ADMIN — POLYETHYLENE GLYCOL 3350 17 G: 17 POWDER, FOR SOLUTION ORAL at 20:53

## 2024-07-15 RX ADMIN — IPRATROPIUM BROMIDE AND ALBUTEROL SULFATE 3 ML: .5; 3 SOLUTION RESPIRATORY (INHALATION) at 19:59

## 2024-07-15 RX ADMIN — IPRATROPIUM BROMIDE AND ALBUTEROL SULFATE 3 ML: .5; 3 SOLUTION RESPIRATORY (INHALATION) at 07:42

## 2024-07-15 RX ADMIN — SUCRALFATE 1 G: 1 TABLET ORAL at 20:53

## 2024-07-15 RX ADMIN — ALBUTEROL SULFATE 2 PUFF: 90 INHALANT RESPIRATORY (INHALATION) at 06:52

## 2024-07-15 RX ADMIN — ACETYLCYSTEINE 2 ML: 200 SOLUTION ORAL; RESPIRATORY (INHALATION) at 07:42

## 2024-07-15 RX ADMIN — ATORVASTATIN CALCIUM 20 MG: 10 TABLET, FILM COATED ORAL at 20:53

## 2024-07-15 RX ADMIN — SUCRALFATE 1 G: 1 TABLET ORAL at 17:15

## 2024-07-15 RX ADMIN — CEFTRIAXONE 1 G: 1 INJECTION, POWDER, FOR SOLUTION INTRAMUSCULAR; INTRAVENOUS at 17:15

## 2024-07-15 RX ADMIN — SENNOSIDES AND DOCUSATE SODIUM 1 TABLET: 50; 8.6 TABLET ORAL at 17:15

## 2024-07-15 RX ADMIN — ACETYLCYSTEINE 2 ML: 200 SOLUTION ORAL; RESPIRATORY (INHALATION) at 19:59

## 2024-07-15 RX ADMIN — REMDESIVIR 100 MG: 100 INJECTION, POWDER, LYOPHILIZED, FOR SOLUTION INTRAVENOUS at 21:05

## 2024-07-15 RX ADMIN — ACETYLCYSTEINE 2 ML: 200 SOLUTION ORAL; RESPIRATORY (INHALATION) at 11:55

## 2024-07-15 RX ADMIN — DEXAMETHASONE 6 MG: 2 TABLET ORAL at 17:15

## 2024-07-15 RX ADMIN — IPRATROPIUM BROMIDE AND ALBUTEROL SULFATE 3 ML: .5; 3 SOLUTION RESPIRATORY (INHALATION) at 11:54

## 2024-07-15 ASSESSMENT — ACTIVITIES OF DAILY LIVING (ADL)
ADLS_ACUITY_SCORE: 25

## 2024-07-15 NOTE — SIGNIFICANT EVENT
625P - Significant Event Note - Mod size Pneumothorax    Had thoracentesis today  XR results discussed with me by Radiology   On 5L    A - Small to Mod size Pneumothorax post thoracentesis vs COPD    Plans  - O2,  - Gen surgery consult  - repeat XR in 4 hours - due at 9PM  - NPO     639P - Per RN, patient is not SOB, HR 70s. Not in distress. RN to inform patient of PTX findings. If any changes in breathing or inc of O2 needs - to call RRT.  RN reported that patient is aware of the chest XR findings    7PM - I did talk to Dr Solomon from Surgery -  Recommend to repeat chest XR - ordered 9PM to be done. May need chest tube insertion     736PM - I did talk to the Heriberto (Son) and he was updated/understand the situation. We do not know if this PTX is due to COPD itself or from thoracentesis procedure.     11PM - HOLD percussive therapy for now. Ask AM docs -- safety to continue this with PTX. Repeat XR in AM (ordered) -- Any form of resp distress, get an XR and not wait till AM. RN informed

## 2024-07-15 NOTE — PLAN OF CARE
Pt to remain NPO until thoracentesis that is scheduled for 1500. Pt hasn't had a BM since 7/11 MD aware scheduled and PRN bowel meds to be given once able. Pt still on 5 liters of 02.   Kirstin Escalante RN on 7/15/2024 at 10:43 AM

## 2024-07-15 NOTE — PROGRESS NOTES
PULMONARY / CRITICAL CARE PROGRESS NOTE    Date / Time of Admission:  7/12/2024  7:58 PM    Assessment:   Principal Problem:    Acute on chronic respiratory failure with hypoxemia (H)  Active Problems:    Nicotine Dependence    Fibromyalgia    Atrial fibrillation, unspecified type (H)    Acute on chronic respiratory failure with hypoxia (H)    Aortic stenosis, severe    Pneumonia due to 2019 novel coronavirus      Code Status:  Full Code    74yoF with emphysema on home O2, afib on Eliquis here with hypoxia in setting of RUL collapse and chronic moderate right pleural effusion.     Plan:   Pulmonary:  1) Chronic moderate right-sided pleural effusion  2) Underlying emphysema  3) Acute on chronic hypoxic respiratory failure  4) RUL collapse  -Bronchial hygiene with Volera. CXR pending for 7/15 post-thoracentesis.   -Duoneb and mucomyst  -Consider bronchoscopy if this doesn't resolve in the RUL  -Thoracentesis today. Repeat CXR after Thora. Orders placed for fluid.   -Down to low-flow; continue attempting to wean  -Smoking cessation.   -Decadron for 10 days  -Remdesivir for 5 days, last day 7/16            Subjective:   HPI:  Mary Kay Tejada is a 74 year old female with history of emphysema on home O2, DMII afib on anticoagulation who presents with hypoxia found to be COVID +. Reports fever and chills at home.     She was initiated on remdesivir and decadron in ED but overnight worsening hypoxia requiring high-flow. This was weaned off and RUL improved but then worsened with bronchial hygiene.     She has been weaned to 4L O2. Her baseline is 3L at home    Principal Problem:    Acute on chronic respiratory failure with hypoxemia (H)  Active Problems:    Nicotine Dependence    Fibromyalgia    Atrial fibrillation, unspecified type (H)    Acute on chronic respiratory failure with hypoxia (H)    Aortic stenosis, severe    Pneumonia due to 2019 novel coronavirus      Allergies: Celebrex [celecoxib] and Latex      MEDS:  Scheduled Meds:  Current Facility-Administered Medications   Medication Dose Route Frequency Provider Last Rate Last Admin    acetylcysteine (MUCOMYST) 20 % nebulizer solution 2 mL  2 mL Nebulization 4x Daily Carmelita Buckley MD   2 mL at 07/15/24 0742    apixaban ANTICOAGULANT (ELIQUIS) tablet 5 mg  5 mg Oral BID Bridgett Vazquez PA-C   5 mg at 07/14/24 2129    atorvastatin (LIPITOR) tablet 20 mg  20 mg Oral At Bedtime Bridgett Vazquez PA-C   20 mg at 07/14/24 2129    bisacodyl (DULCOLAX) suppository 10 mg  10 mg Rectal Once Johnny Arellano MD        [Held by provider] carbamide peroxide (DEBROX) 6.5 % otic solution 5 drop  5 drop Both Ears BID Bridgett Vazquez PA-C        cefTRIAXone (ROCEPHIN) 1 g vial to attach to  mL bag for ADULTS or NS 50 mL bag for PEDS  1 g Intravenous Q24H Carmelita Buckley MD   1 g at 07/14/24 1823    dexAMETHasone (DECADRON) tablet 6 mg  6 mg Oral Daily Bridgett Vazquez PA-C   6 mg at 07/14/24 1251    diltiazem ER COATED BEADS (CARDIZEM CD/CARTIA XT) 24 hr capsule 120 mg  120 mg Oral Daily Lisette Boucher MD   120 mg at 07/14/24 0934    furosemide (LASIX) tablet 40 mg  40 mg Oral Daily Bridgett Vazquez PA-C   40 mg at 07/14/24 0934    gabapentin (NEURONTIN) tablet 600 mg  600 mg Oral TID Bridgett Vazquez PA-C   600 mg at 07/14/24 2129    guaiFENesin (MUCINEX) 12 hr tablet 600 mg  600 mg Oral BID Bridgett Vazquez PA-C   600 mg at 07/14/24 2129    insulin aspart (NovoLOG) injection (RAPID ACTING)  1-3 Units Subcutaneous 4x Daily AC & HS Johnny Arellano MD   1 Units at 07/14/24 2135    ipratropium - albuterol 0.5 mg/2.5 mg/3 mL (DUONEB) neb solution 3 mL  3 mL Nebulization 4x daily Carmelita Buckley MD   3 mL at 07/15/24 0742    metoprolol tartrate (LOPRESSOR) tablet 25 mg  25 mg Oral BID Bridgett Vazquez PA-C   25 mg at 07/14/24 2129    pantoprazole (PROTONIX) EC tablet 40 mg  40 mg Oral BID Bridgett Vazquez PA-C   40 mg at 07/14/24  2130    polyethylene glycol (MIRALAX) Packet 17 g  17 g Oral BID Johnny Arellano MD        remdesivir 100 mg in sodium chloride 0.9 % 250 mL intermittent infusion  100 mg Intravenous Q24H Johnny Arellano  mL/hr at 07/14/24 2139 100 mg at 07/14/24 2139    And    sodium chloride 0.9% BOLUS 50 mL  50 mL Intravenous Q24H Johnny Arellano  mL/hr at 07/14/24 2223 50 mL at 07/14/24 2223    sennosides (SENOKOT) tablet 8.6 mg  8.6 mg Oral BID Johnny Arellano MD        sodium chloride (PF) 0.9% PF flush 3 mL  3 mL Intracatheter Q8H Bridgett Vazquez PA-C   3 mL at 07/14/24 2224    sucralfate (CARAFATE) tablet 1 g  1 g Oral 4x Daily AC & HS Bridgett Vazquez PA-C   1 g at 07/14/24 2129     Continuous Infusions:  Current Facility-Administered Medications   Medication Dose Route Frequency Provider Last Rate Last Admin    Patient is already receiving anticoagulation with heparin, enoxaparin (LOVENOX), warfarin (COUMADIN)  or other anticoagulant medication   Does not apply Continuous PRN Bridgett Vazquez PA-C         PRN Meds:.  Current Facility-Administered Medications   Medication Dose Route Frequency Provider Last Rate Last Admin    acetaminophen (TYLENOL) tablet 650 mg  650 mg Oral Q4H PRN Bridgett Vazquez PA-C        Or    acetaminophen (TYLENOL) Suppository 650 mg  650 mg Rectal Q4H PRN Bridgett Vazquez PA-C        albuterol (PROVENTIL HFA/VENTOLIN HFA) inhaler  2 puff Inhalation Q4H PRN Bridgett Vazquez PA-C   2 puff at 07/15/24 0652    calcium carbonate (TUMS) chewable tablet 1,000 mg  1,000 mg Oral 4x Daily PRN Bridgett Vazquez PA-C        glucose gel 15-30 g  15-30 g Oral Q15 Min PRN Bridgett Vazquez PA-C        Or    dextrose 50 % injection 25-50 mL  25-50 mL Intravenous Q15 Min PRN Bridgett Vazquez PA-C        Or    glucagon injection 1 mg  1 mg Subcutaneous Q15 Min PRN Bridgett Vazquez PA-C        ipratropium - albuterol 0.5 mg/2.5 mg/3 mL (DUONEB) neb  "solution 3 mL  3 mL Nebulization Q4H PRN Lisette Boucher MD        lidocaine (LMX4) cream   Topical Q1H PRN Bridgett Vazquez PA-C        lidocaine 1 % 0.1-1 mL  0.1-1 mL Other Q1H PRN Bridgett Vazquez PA-C        naloxone (NARCAN) nasal spray 4 mg  4 mg Alternating Nostrils Once PRN Bridgett Vazquez PA-C        ondansetron (ZOFRAN ODT) ODT tab 4 mg  4 mg Oral Q6H PRN Bridgett Vazquez PA-C        Or    ondansetron (ZOFRAN) injection 4 mg  4 mg Intravenous Q6H PRN Bridgett Vazquez PA-C        oxyCODONE (ROXICODONE) tablet 10 mg  10 mg Oral Q6H PRN Bridgett Vazquez PA-C        Patient is already receiving anticoagulation with heparin, enoxaparin (LOVENOX), warfarin (COUMADIN)  or other anticoagulant medication   Does not apply Continuous PRN Bridgett Vazquez PA-C        senna-docusate (SENOKOT-S/PERICOLACE) 8.6-50 MG per tablet 1 tablet  1 tablet Oral BID PRN Bridgett Vazquez PA-C   1 tablet at 07/14/24 2129    Or    senna-docusate (SENOKOT-S/PERICOLACE) 8.6-50 MG per tablet 2 tablet  2 tablet Oral BID PRN Bridgett Vazquez PA-C        sodium chloride (PF) 0.9% PF flush 3 mL  3 mL Intracatheter q1 min prn Bridgett Vazquez PA-C             Objective:   VITALS:  /57 (BP Location: Left arm)   Pulse 65   Temp 97.7  F (36.5  C) (Oral)   Resp 16   Ht 1.651 m (5' 5\")   Wt 65.3 kg (143 lb 14.4 oz)   LMP  (LMP Unknown)   SpO2 90%   BMI 23.95 kg/m    EXAM:  General appearance: alert, appears stated age, and cooperative  Neck: no adenopathy and supple, symmetrical, trachea midline  Lungs: clear to auscultation bilaterally but minimal air movement.   Heart: irregularly irregular  Abdomen: soft, non-tender; bowel sounds normal; no masses,  no organomegaly  Extremities: no edema  Skin: Skin color, texture, turgor normal. No rashes or lesions  Neurologic: Grossly normal    I&O:     Intake/Output Summary (Last 24 hours) at 7/15/2024 1119  Last data filed at 7/15/2024 " 0633  Gross per 24 hour   Intake 1095 ml   Output 2425 ml   Net -1330 ml         Data Review:  Chest Xray  Personally reviewed image/s and Personally reviewed impression/s  EXAM: XR CHEST PORT 1 VIEW  LOCATION: [facility]  DATE: 7/14/2024     INDICATION: Evaluate partial collapse  COMPARISON: 7/13/2024     IMPRESSION: Increased atelectasis of the RIGHT lung with rightward shift of the mediastinum. Small right pleural effusion. Clear left lung. Left subclavian approach pacing device. Cardiac silhouette within normal limits. Prosthetic aortic valve.    LABS      Latest Ref Rng & Units 7/12/2024     8:30 PM 7/13/2024     6:29 AM 7/14/2024     8:29 AM   Glucose Values   Glucose 70 - 99 mg/dL 188  161  100        Results for orders placed or performed during the hospital encounter of 07/12/24   Basic metabolic panel   Result Value Ref Range    Sodium 141 135 - 145 mmol/L    Potassium 4.8 3.4 - 5.3 mmol/L    Chloride 103 98 - 107 mmol/L    Carbon Dioxide (CO2) 32 (H) 22 - 29 mmol/L    Anion Gap 6 (L) 7 - 15 mmol/L    Urea Nitrogen 29.0 (H) 8.0 - 23.0 mg/dL    Creatinine 0.79 0.51 - 0.95 mg/dL    GFR Estimate 78 >60 mL/min/1.73m2    Calcium 8.8 8.8 - 10.2 mg/dL    Glucose 100 (H) 70 - 99 mg/dL     Lab Results   Component Value Date    WBC 5.8 07/15/2024    HGB 10.5 (L) 07/15/2024    HCT 35.6 07/15/2024    MCV 84 07/15/2024     (L) 07/15/2024       ABG:  Recent Labs   Lab 07/13/24  0316 07/12/24 2030   O2PER 0 36             By:  Carmelita Buckley MD  Pulmonary/Critical Care

## 2024-07-15 NOTE — PROGRESS NOTES
[facility]    Medicine Progress Note - Hospitalist Service    Date of Admission:  7/12/2024    Assessment & Plan   Mary Kay Tejada is a 74 year old female admitted with acute on chronic hypoxic and hypercarbic respiratory failure in the setting of covid19.  Modest improvement in supplemental oxygen requirement.  Sputum cx demonstrating concomitant bacterial pneumonia with e. Coli, patient now on ceftriaxone.  Continue remdesivir, dexamethasone for covid19.    # Acute on chronic hypoxic and hypercarbic respiratory failure 2/2 covid19  # Partial right lung collapse  # Pleural effusion  - IR thoracentesis  - dexamethasone  - remdesivir  - CTX    # Constipation  - suppository x1  - senna, miralax bid    # DM  - ISS    # Hx severe aortic stenosis s/p TAVR  # HFpEF  # Afib  - furosemide  - apixaban  - diltiazem, metoprolol          Diet: Fluid restriction 1800 ML FLUID  Regular Diet Adult    Chairez Catheter: Not present  Lines: None     Cardiac Monitoring: None  Code Status: Full Code      Clinically Significant Risk Factors               # Coagulation Defect: INR = 1.27 (Ref range: 0.85 - 1.15) and/or PTT = 38 Seconds (Ref range: 22 - 38 Seconds), will monitor for bleeding    # Hypertension: Noted on problem list                  # Financial/Environmental Concerns: none   # Pacemaker present       Disposition Plan     Medically Ready for Discharge: Anticipated in 2-4 Days             Johnny Arellano MD  Hospitalist Service  [facility]  Securely message with [employer] (more info)  Text page via [employer] Paging/Directory   ______________________________________________________________________    Interval History   Denies dyspnea, chest pain, or chest pressure.  Reports abdominal bloating, denies abdominal pain.  Has not had a bowel movement in days.    Physical Exam   Vital Signs: Temp: 97.5  F (36.4  C) Temp src: Oral BP: 124/54 Pulse: 65   Resp: 16 SpO2: 90 % O2 Device:  Nasal cannula Oxygen Delivery: 5 LPM  Weight: 143 lbs 14.4 oz    Gen:  sitting in chair in no extremis  Neuro: alert, conversant  CV:  nl rate, regular rhythm to palpation  Pulm: no acute resp distress, wheezing at right base  GI:  abdomen distended, NTTP, bowel sounds present    Medical Decision Making             Data   Reviewed:    WBC 5.8  Hgb 11  Plts 141

## 2024-07-15 NOTE — PROVIDER NOTIFICATION
Urgent finding by radiologist, RN called at 1813, provider notified at 1814 with phone number to call radiologist stat.

## 2024-07-15 NOTE — PROGRESS NOTES
RT Note:    Scheduled neb given per order with volera treatment. Pt seen on 5L NC with diminished;crackles breath sounds. Will continue to monitor and assess pt.

## 2024-07-15 NOTE — PROGRESS NOTES
Received pt on 5 LPM NC, HFNC on S/B this shift.  Pt given Duoneb/MM as ordered.  Pt did VOLARA with 0800 tx, 1200 tx  not done pt was having some dizziness, and 1600 tx not done as  pt sore from thoracentesis this afternoon.  Pt on 4-5 LPM NC this shift, o2 sats low to mid 90's, RR 18, BS diminished.  Pt has productive cough of small to moderate pale green.  Will continue to monitor pt.

## 2024-07-15 NOTE — PLAN OF CARE
Problem: Adult Inpatient Plan of Care  Goal: Absence of Hospital-Acquired Illness or Injury  Outcome: Progressing  Intervention: Identify and Manage Fall Risk  Recent Flowsheet Documentation  Taken 7/15/2024 0100 by Jaqueline Espinoza RN  Safety Promotion/Fall Prevention: safety round/check completed  Intervention: Prevent Skin Injury  Recent Flowsheet Documentation  Taken 7/14/2024 2354 by Jaqueline Espinoza RN  Body Position: position maintained  Goal: Optimal Comfort and Wellbeing  Outcome: Progressing   Goal Outcome Evaluation:               Pt sleeping intermittently overnight.  Using purewick until 0400.  Pt with diminished lung sounds.  Cough intermittent.  O2 at 5 liters per nasal cannula.

## 2024-07-16 ENCOUNTER — APPOINTMENT (OUTPATIENT)
Dept: RADIOLOGY | Facility: CLINIC | Age: 74
DRG: 177 | End: 2024-07-16
Attending: INTERNAL MEDICINE
Payer: COMMERCIAL

## 2024-07-16 LAB
GLUCOSE BLDC GLUCOMTR-MCNC: 129 MG/DL (ref 70–99)
GLUCOSE BLDC GLUCOMTR-MCNC: 330 MG/DL (ref 70–99)
GLUCOSE BLDC GLUCOMTR-MCNC: 87 MG/DL (ref 70–99)

## 2024-07-16 PROCEDURE — 94640 AIRWAY INHALATION TREATMENT: CPT

## 2024-07-16 PROCEDURE — 250N000012 HC RX MED GY IP 250 OP 636 PS 637

## 2024-07-16 PROCEDURE — 94640 AIRWAY INHALATION TREATMENT: CPT | Mod: 76

## 2024-07-16 PROCEDURE — 99231 SBSQ HOSP IP/OBS SF/LOW 25: CPT

## 2024-07-16 PROCEDURE — 99233 SBSQ HOSP IP/OBS HIGH 50: CPT | Performed by: HOSPITALIST

## 2024-07-16 PROCEDURE — 250N000009 HC RX 250: Performed by: INTERNAL MEDICINE

## 2024-07-16 PROCEDURE — 258N000003 HC RX IP 258 OP 636: Performed by: HOSPITALIST

## 2024-07-16 PROCEDURE — 120N000004 HC R&B MS OVERFLOW

## 2024-07-16 PROCEDURE — 99232 SBSQ HOSP IP/OBS MODERATE 35: CPT | Performed by: INTERNAL MEDICINE

## 2024-07-16 PROCEDURE — 71045 X-RAY EXAM CHEST 1 VIEW: CPT

## 2024-07-16 PROCEDURE — 250N000013 HC RX MED GY IP 250 OP 250 PS 637: Performed by: INTERNAL MEDICINE

## 2024-07-16 PROCEDURE — 250N000011 HC RX IP 250 OP 636: Performed by: HOSPITALIST

## 2024-07-16 PROCEDURE — 999N000157 HC STATISTIC RCP TIME EA 10 MIN

## 2024-07-16 PROCEDURE — 250N000011 HC RX IP 250 OP 636: Performed by: INTERNAL MEDICINE

## 2024-07-16 PROCEDURE — 250N000013 HC RX MED GY IP 250 OP 250 PS 637: Performed by: HOSPITALIST

## 2024-07-16 PROCEDURE — 250N000013 HC RX MED GY IP 250 OP 250 PS 637

## 2024-07-16 RX ORDER — BISACODYL 10 MG
10 SUPPOSITORY, RECTAL RECTAL ONCE
Status: COMPLETED | OUTPATIENT
Start: 2024-07-16 | End: 2024-07-16

## 2024-07-16 RX ADMIN — SUCRALFATE 1 G: 1 TABLET ORAL at 20:35

## 2024-07-16 RX ADMIN — SUCRALFATE 1 G: 1 TABLET ORAL at 12:05

## 2024-07-16 RX ADMIN — METOPROLOL TARTRATE 25 MG: 25 TABLET, FILM COATED ORAL at 11:51

## 2024-07-16 RX ADMIN — APIXABAN 5 MG: 5 TABLET, FILM COATED ORAL at 20:35

## 2024-07-16 RX ADMIN — REMDESIVIR 100 MG: 100 INJECTION, POWDER, LYOPHILIZED, FOR SOLUTION INTRAVENOUS at 20:34

## 2024-07-16 RX ADMIN — GABAPENTIN 600 MG: 600 TABLET, FILM COATED ORAL at 20:35

## 2024-07-16 RX ADMIN — IPRATROPIUM BROMIDE AND ALBUTEROL SULFATE 3 ML: .5; 3 SOLUTION RESPIRATORY (INHALATION) at 07:58

## 2024-07-16 RX ADMIN — GUAIFENESIN 600 MG: 600 TABLET ORAL at 20:35

## 2024-07-16 RX ADMIN — IPRATROPIUM BROMIDE AND ALBUTEROL SULFATE 3 ML: .5; 3 SOLUTION RESPIRATORY (INHALATION) at 19:19

## 2024-07-16 RX ADMIN — APIXABAN 5 MG: 5 TABLET, FILM COATED ORAL at 11:49

## 2024-07-16 RX ADMIN — ACETYLCYSTEINE 2 ML: 200 SOLUTION ORAL; RESPIRATORY (INHALATION) at 16:04

## 2024-07-16 RX ADMIN — GABAPENTIN 600 MG: 600 TABLET, FILM COATED ORAL at 11:51

## 2024-07-16 RX ADMIN — PANTOPRAZOLE SODIUM 40 MG: 40 TABLET, DELAYED RELEASE ORAL at 11:50

## 2024-07-16 RX ADMIN — DEXAMETHASONE 6 MG: 2 TABLET ORAL at 12:05

## 2024-07-16 RX ADMIN — PANTOPRAZOLE SODIUM 40 MG: 40 TABLET, DELAYED RELEASE ORAL at 20:35

## 2024-07-16 RX ADMIN — ACETYLCYSTEINE 2 ML: 200 SOLUTION ORAL; RESPIRATORY (INHALATION) at 19:19

## 2024-07-16 RX ADMIN — METOPROLOL TARTRATE 25 MG: 25 TABLET, FILM COATED ORAL at 20:35

## 2024-07-16 RX ADMIN — GABAPENTIN 600 MG: 600 TABLET, FILM COATED ORAL at 15:02

## 2024-07-16 RX ADMIN — DILTIAZEM HYDROCHLORIDE 120 MG: 120 CAPSULE, COATED, EXTENDED RELEASE ORAL at 11:49

## 2024-07-16 RX ADMIN — SODIUM CHLORIDE 50 ML: 9 INJECTION, SOLUTION INTRAVENOUS at 20:34

## 2024-07-16 RX ADMIN — CEFTRIAXONE 1 G: 1 INJECTION, POWDER, FOR SOLUTION INTRAMUSCULAR; INTRAVENOUS at 17:41

## 2024-07-16 RX ADMIN — SENNOSIDES 8.6 MG: 8.6 TABLET, FILM COATED ORAL at 11:53

## 2024-07-16 RX ADMIN — SUCRALFATE 1 G: 1 TABLET ORAL at 17:41

## 2024-07-16 RX ADMIN — GUAIFENESIN 600 MG: 600 TABLET ORAL at 11:50

## 2024-07-16 RX ADMIN — FUROSEMIDE 40 MG: 40 TABLET ORAL at 11:49

## 2024-07-16 RX ADMIN — ACETYLCYSTEINE 2 ML: 200 SOLUTION ORAL; RESPIRATORY (INHALATION) at 07:58

## 2024-07-16 RX ADMIN — IPRATROPIUM BROMIDE AND ALBUTEROL SULFATE 3 ML: .5; 3 SOLUTION RESPIRATORY (INHALATION) at 16:04

## 2024-07-16 RX ADMIN — ATORVASTATIN CALCIUM 20 MG: 10 TABLET, FILM COATED ORAL at 20:35

## 2024-07-16 ASSESSMENT — ACTIVITIES OF DAILY LIVING (ADL)
ADLS_ACUITY_SCORE: 27
ADLS_ACUITY_SCORE: 25
ADLS_ACUITY_SCORE: 27
ADLS_ACUITY_SCORE: 25
ADLS_ACUITY_SCORE: 27
ADLS_ACUITY_SCORE: 25
ADLS_ACUITY_SCORE: 27
ADLS_ACUITY_SCORE: 27
ADLS_ACUITY_SCORE: 25
ADLS_ACUITY_SCORE: 27
ADLS_ACUITY_SCORE: 25

## 2024-07-16 NOTE — PROGRESS NOTES
General Surgery Progress Note:    Hospital Day # 4    ASSESSMENT:     1. Pneumonia due to 2019 novel coronavirus    2. Acute on chronic respiratory failure with hypoxemia (H)        Mary Kay Tejada is a 74 year old female with PMH COPD oxygen dependent 3L at baseline usually overnight intermittent in day up to 4L, DM type II 6.2% on 2/6/24 presenting s/p thoracentesis 7/15 with postprocedural pneumothorax. CXR with stable right pneumothorax at the base of right lung with mediastinal shift on 7/15 and 7/16. Patient HR 70s requiring 5L oxygen, discussion regarding chest tube placement with patient and she would like to defer as the PTX is stable at this time. Recommend tube placement for faster recovery however reasonable to watch conservatively at this time. Recommend intervention should she decline or PTX worsens.    PLAN:   -incentive spirometry   -Sitting upright as able and activity as tolerated  -CXR if increased oxygen requirements  -Recommend IR pigtail placement if patient should clinically decline or CXR demonstrates worsening pneumothorax  -Medical management per primary team    SUBJECTIVE:   Mary Kay Tejada was seen on rounds. Patient states she is doing okay and breathing okay, she is hoping to avoid the chest tube placement because the CXR this morning was showing that the PTX wasn't worsening. She would like to wait on the chest tube and see if it gets better. Is hoping to ambulate in the room and continue sitting in the chair. States the procedure yesterday went fine until afterward. Denies current chest pain    Patient Vitals for the past 24 hrs:   BP Temp Temp src Pulse Resp SpO2   07/16/24 0921 127/59 98  F (36.7  C) Oral 70 18 93 %   07/16/24 0434 -- -- -- 68 -- 93 %   07/16/24 0415 122/58 97.9  F (36.6  C) Oral 70 18 98 %   07/16/24 0035 108/53 97.4  F (36.3  C) Oral 82 18 93 %   07/16/24 0031 -- -- -- 80 -- 95 %   07/15/24 2021 103/56 98.1  F (36.7  C) Oral 76 -- 90 %   07/15/24 1959 -- -- --  71 -- 90 %   07/15/24 1900 103/56 98.2  F (36.8  C) Oral 70 -- 91 %   07/15/24 1845 -- -- -- 72 -- 91 %   07/15/24 1830 -- -- -- 73 -- 92 %   07/15/24 1815 -- -- -- 67 -- 90 %   07/15/24 1800 -- -- -- 61 -- 93 %   07/15/24 1755 -- -- -- 69 -- 91 %   07/15/24 1717 -- -- -- 69 -- 91 %   07/15/24 1700 -- -- -- 62 -- 93 %   07/15/24 1634 -- -- -- 61 -- 96 %   07/15/24 1600 -- -- -- 60 -- 93 %   07/15/24 1547 103/55 98.4  F (36.9  C) Oral 64 20 94 %   07/15/24 1240 99/53 97.6  F (36.4  C) Oral 67 18 100 %       Physical Exam:  General: patient seen sitting upright in chair in no acute distress  Resp: some increased work of breathing, breathing comfortably on 5L oxygen, rhonchi over left posterior lung field, breath sounds auscultated throughout right lung fields maybe diminished right lower  Abdomen: generally soft nontender  Extremities: No edema, cyanosis, or obvious deformities visualized on exam    No results displayed because visit has over 200 results.           APRIL Salmeron [facility]  [address]  [address]  [place], [place] [id number] [place] [address] [phone]

## 2024-07-16 NOTE — PROGRESS NOTES
ADAN on hold per MD.  Duoneb/MM nebs given as ordered.  Pt declined 1200 neb.  Pt on 3-5 LPM NC, o2 sats mid 90's, RR 18, BS diminished  Will continue to monitor pt

## 2024-07-16 NOTE — PROGRESS NOTES
[facility]    Medicine Progress Note - Hospitalist Service    Date of Admission:  7/12/2024    Assessment & Plan   Mary Kay Tejada is a 74 year old female admitted with acute on chronic hypoxic and hypercarbic respiratory failure in the setting of covid19 and concomitant bacterial pneumonia.  She is overall clinically stable. She is s/p thoracentesis which was complicated by pneumothorax.  Continue current abx, steroids, and remdesivir.    # Acute on chronic hypoxic and hypercarbic respiratory failure 2/2 covid19 (chronic 3L o2 requirement)  # Community acquired pneumonia  # Pleural effusion  # Pneumothorax  - dexamethasone  - remdesivir  - CTX    # Constipation, resolved  - senna, miralax bid    # DM  - ISS    # Hx severe aortic stenosis s/p TAVR  # HFpEF  # Afib  - furosemide  - apixaban  - diltiazem, metoprolol          Diet: Fluid restriction 1800 ML FLUID  NPO for Medical/Clinical Reasons Except for: Ice Chips, Meds      Chairez Catheter: Not present  Lines: None     Cardiac Monitoring: None  Code Status: Full Code      Clinically Significant Risk Factors                  # Hypertension: Noted on problem list                  # Financial/Environmental Concerns: none   # Pacemaker present       Disposition Plan     Medically Ready for Discharge: Anticipated in 2-4 Days             Johnny Arellano MD  Hospitalist Service  [facility]  Securely message with [employer] (more info)  Text page via [employer] Paging/Directory   ______________________________________________________________________    Interval History   Denies dyspnea or abdominal pain.  Reports pain at thoracentesis site.  Had a small bowel movement.    Physical Exam   Vital Signs: Temp: 97.9  F (36.6  C) Temp src: Oral BP: 122/58 Pulse: 68   Resp: 18 SpO2: 93 % O2 Device: Nasal cannula Oxygen Delivery: 5 LPM  Weight: 143 lbs 14.4 oz    Gen:  sitting in chair in no extremis  Neuro: alert, conversant  CV:  nl  rate, regular rhythm  Pulm: no acute resp distress, ctab  GI:  abdomen NTTP  Ext:  trace lower extremity edema    Medical Decision Making             Data   Reviewed:      CXR  IMPRESSION: Cardiac pacing device is again observed along with mediastinal shift toward the right. There is a subpulmonic right pneumothorax which appears unchanged or slightly smaller than on x-ray yesterday. The left lung is clear and free of   pneumothorax.

## 2024-07-16 NOTE — PROGRESS NOTES
Repeat XR unchanged, no increase in O2 requirements.  Repeat XR in AM.  Call with other changes.    Ian Solomon MD

## 2024-07-16 NOTE — PLAN OF CARE
Problem: Comorbidity Management  Goal: Maintenance of Behavioral Health Symptom Control  Outcome: Progressing     Problem: Comorbidity Management  Goal: Blood Glucose Levels Within Targeted Range  Outcome: Progressing   Pt oriented x4. Ambulating with standby assist. NPO with ice chips overnight. Eliquis held per provider. AM chest x-ray showed that pneumothorax did not progress overnight. VSS. Pt utilizing 6L O2 overnight. Rested comfortably. Purewick in place. Taking medications as prescribed. No BM overnight.

## 2024-07-16 NOTE — CONSULTS
General Surgery Consult Note     Mary Kay Tejada MRN# [id number]   YOB: 1950 Age: 74 year old      Date of Admission:  7/12/2024    Reason for Consult: Pneumothorax    Assessment/Plan: Mary Kay Tejada is a(n) 74 year old year old female with history as below, s/p thoracentesis today, post procedure XR with pneumothorax.  Currently with no SOB, no increase in O2 requirements.  Agree with interval XR.  If enlarging or with clinical change would need decompression.  I would be placing chest tube, could consider IR/ED to place pigtail if desired.  -------------------    HPI: Mary Kay Tejada is a(n) 74 year old year old female with history as below, s/p thoracentesis today, post procedure XR with pneumothorax. No complaints at this time.  Overall feels well.  No SOB, no chest pain.    Past Medical History:  Past Medical History:   Diagnosis Date    Anemia     Aortic stenosis     Aortic valve disorder     Atrial fibrillation (H)     Atrial flutter (H)     Benign neoplasm of adenomatous polyp     large intestine     Chronic constipation     Chronic heart failure with preserved ejection fraction (H) 02/29/2024    Chronic pain syndrome     Congestive heart failure (H)     COPD (chronic obstructive pulmonary disease) (H)     Oxygen at night     Dependence on supplemental oxygen     Oxygen at noc, during the day as needed    Depression     Diabetes mellitus (H)     Dry eye syndrome     Fibromyalgia     Ganglion     left wrist    GERD (gastroesophageal reflux disease)     Hyperlipidemia     Hypertension     Hypokalemia     Infective otitis externa, unspecified     Created by Conversion     Larynx edema     Lung disease     Malignant neoplasm of vulva (H)     Created by Conversion [facility] Annotation: Apr 17 2007  8:24AM - Cammy Bui:  resection per Dr. Alfonso Mane 9/06;  Replacement Utility updated for latest IMO load    Medial epicondylitis     Onychomycosis     Osteoarthritis      Peptic ulcer     Polyneuropathy     Vulvar malignant neoplasm (H)        Past Surgical History:  Past Surgical History:   Procedure Laterality Date    BIOPSY BREAST Right     BIOPSY BREAST Right 01/28/2015    BIOPSY BREAST Right 01/28/2015    Procedure: RIGHT BREAST BIOPSY AFTER WIRE LOCALIZATION AT 0940;  Surgeon: Renée Soriano MD;  Location: [facility];  Service:     BIOPSY OF BREAST, INCISIONAL      Description: Incisional Breast Biopsy;  Recorded: 11/13/2007;  Comments: benign    COLONOSCOPY N/A 06/14/2019    Procedure: COLONOSCOPY;  Surgeon: Eduardo Mora MD;  Location: [facility] OR;  Service: Gastroenterology    CV CORONARY ANGIOGRAM N/A 02/08/2024    Procedure: CV CORONARY ANGIOGRAM;  Surgeon: Moises Valencia MD;  Location: [facility] LAB CV    CV LEFT HEART CATH N/A 02/08/2024    Procedure: Left Heart Catheterization;  Surgeon: Moises Valencia MD;  Location: [facility] LAB CV    CV RIGHT HEART CATH MEASUREMENTS RECORDED N/A 02/08/2024    Procedure: Right Heart Catheterization;  Surgeon: Moises Valencia MD;  Location: [facility] CV    CV TRANSCATHETER AORTIC VALVE REPLACEMENT-FEMORAL APPROACH N/A 02/20/2024    Procedure: Transcatheter Aortic Valve Replacement, possible cardiopulmonary bypass, possible surgical intervention;  Surgeon: Moises Valencia MD;  Location: [facility] CV    EP PACEMAKER DEVICE & IMPLANT- HIS LEAD DUAL N/A 3/7/2024    Procedure: Pacemaker Device & Lead Implant - HIS Lead Dual;  Surgeon: Donnell Leon MD;  Location: [facility] CV    ESOPHAGOSCOPY, GASTROSCOPY, DUODENOSCOPY (EGD), COMBINED N/A 11/06/2018    Procedure: ESOPHAGOGASTRODUODENOSCOPY;  Surgeon: Lit Fernando MD;  Location: [facility] [place];  Service:     HYSTERECTOMY      JOINT REPLACEMENT Left     TKA    OR TRANSCATHETER AORTIC VALVE REPLACEMENT, FEMORAL PERCUTANEOUS APPROACH (STANDBY) N/A 02/20/2024    Procedure: OR TRANSCATHETER AORTIC VALVE REPLACEMENT,  "FEMORAL PERCUTANEOUS APPROACH (STANDBY);  Surgeon: Ishmael Farias MD;  Location: [facility] CATH LAB CV    PICC TRIPLE LUMEN PLACEMENT  01/12/2023         AR ABLATE HEART DYSRHYTHM FOCUS      Description: Catheter Ablation Atrial Fibrillation;  Recorded: 07/31/2012;  Comments: 7/24/12 PVI with Dr. Gardiner and nilay to all 5 pulm veins and CTI fl ablation line as well.    TRANSCATHETER AORTIC-VALVE REPLACEMENT      ZZC SUPRACERV ABD HYSTERECTOMY      Description: Supracervical Hysterectomy;  Proc Date: 01/01/1985;  Comments: some cervix left!; ovaries intact; done for bleeding       Allergies:     Allergies   Allergen Reactions    Celebrex [Celecoxib] Rash     patient had butterfly rash - \"lupus-like\"      Latex Rash       Medications:  Current Facility-Administered Medications   Medication Dose Route Frequency Provider Last Rate Last Admin    acetaminophen (TYLENOL) tablet 650 mg  650 mg Oral Q4H PRN Bridgett Vazquez PA-C        Or    acetaminophen (TYLENOL) Suppository 650 mg  650 mg Rectal Q4H PRN Bridgett Vazquez PA-C        acetylcysteine (MUCOMYST) 20 % nebulizer solution 2 mL  2 mL Nebulization 4x Daily Carmelita Buckley MD   2 mL at 07/15/24 1959    albuterol (PROVENTIL HFA/VENTOLIN HFA) inhaler  2 puff Inhalation Q4H PRN Bridgett Vazquez PA-C   2 puff at 07/15/24 0652    apixaban ANTICOAGULANT (ELIQUIS) tablet 5 mg  5 mg Oral BID Bridgett Vazquez PA-C   5 mg at 07/14/24 2129    atorvastatin (LIPITOR) tablet 20 mg  20 mg Oral At Bedtime Bridgett Vazquez PA-C   20 mg at 07/14/24 2129    calcium carbonate (TUMS) chewable tablet 1,000 mg  1,000 mg Oral 4x Daily PRN Bridgett Vazquez PA-C        [Held by provider] carbamide peroxide (DEBROX) 6.5 % otic solution 5 drop  5 drop Both Ears BID Bridgett Vazquez PA-C        cefTRIAXone (ROCEPHIN) 1 g vial to attach to  mL bag for ADULTS or NS 50 mL bag for PEDS  1 g Intravenous Q24H Ina, Viola, MD   1 g at 07/15/24 1715    " dexAMETHasone (DECADRON) tablet 6 mg  6 mg Oral Daily Bridgett Vazquez PA-C   6 mg at 07/15/24 1715    glucose gel 15-30 g  15-30 g Oral Q15 Min PRN Bridgett Vazquez PA-C        Or    dextrose 50 % injection 25-50 mL  25-50 mL Intravenous Q15 Min PRN Bridgett Vazquez PA-C        Or    glucagon injection 1 mg  1 mg Subcutaneous Q15 Min PRN Bridgett Vazquez PA-C        diltiazem ER COATED BEADS (CARDIZEM CD/CARTIA XT) 24 hr capsule 120 mg  120 mg Oral Daily Lisette Boucher MD   120 mg at 07/14/24 0934    furosemide (LASIX) tablet 40 mg  40 mg Oral Daily Bridgett Vazquez PA-C   40 mg at 07/14/24 0934    gabapentin (NEURONTIN) tablet 600 mg  600 mg Oral TID Bridgett Vazquez PA-C   600 mg at 07/14/24 2129    guaiFENesin (MUCINEX) 12 hr tablet 600 mg  600 mg Oral BID Bridgett Vazquez PA-C   600 mg at 07/14/24 2129    insulin aspart (NovoLOG) injection (RAPID ACTING)  1-3 Units Subcutaneous 4x Daily AC & HS AdanJohnny frederick MD   1 Units at 07/14/24 2135    ipratropium - albuterol 0.5 mg/2.5 mg/3 mL (DUONEB) neb solution 3 mL  3 mL Nebulization Q4H PRN Lisette Boucher MD        ipratropium - albuterol 0.5 mg/2.5 mg/3 mL (DUONEB) neb solution 3 mL  3 mL Nebulization 4x daily Carmelita Buckley MD   3 mL at 07/15/24 1959    lidocaine (LMX4) cream   Topical Q1H PRN Bridgett Vazquez PA-C        lidocaine 1 % 0.1-1 mL  0.1-1 mL Other Q1H PRN Bridgett Vazquez PA-C        metoprolol tartrate (LOPRESSOR) tablet 25 mg  25 mg Oral BID Bridgett Vazquez PA-C   25 mg at 07/14/24 2129    naloxone (NARCAN) nasal spray 4 mg  4 mg Alternating Nostrils Once PRN Bridgett Vazquez PA-C        ondansetron (ZOFRAN ODT) ODT tab 4 mg  4 mg Oral Q6H PRN Bridgett Vazquez PA-C        Or    ondansetron (ZOFRAN) injection 4 mg  4 mg Intravenous Q6H PRN Bridgett Vazquez PA-C        oxyCODONE (ROXICODONE) tablet 10 mg  10 mg Oral Q6H PRN Bridgett Vazquez PA-C        pantoprazole  (PROTONIX) EC tablet 40 mg  40 mg Oral BID Bridgett Vazquez PA-C   40 mg at 07/14/24 2130    Patient is already receiving anticoagulation with heparin, enoxaparin (LOVENOX), warfarin (COUMADIN)  or other anticoagulant medication   Does not apply Continuous PRN Bridgett Vazquez PA-C        polyethylene glycol (MIRALAX) Packet 17 g  17 g Oral BID Johnny Arellano MD        remdesivir 100 mg in sodium chloride 0.9 % 250 mL intermittent infusion  100 mg Intravenous Q24H Johnny Arellano  mL/hr at 07/14/24 2139 100 mg at 07/14/24 2139    And    sodium chloride 0.9% BOLUS 50 mL  50 mL Intravenous Q24H Johnny Arellano  mL/hr at 07/14/24 2223 50 mL at 07/14/24 2223    senna-docusate (SENOKOT-S/PERICOLACE) 8.6-50 MG per tablet 1 tablet  1 tablet Oral BID PRN Bridgett Vazquez PA-C   1 tablet at 07/15/24 1715    Or    senna-docusate (SENOKOT-S/PERICOLACE) 8.6-50 MG per tablet 2 tablet  2 tablet Oral BID PRN Bridgett Vazquez PA-C        sennosides (SENOKOT) tablet 8.6 mg  8.6 mg Oral BID Johnny Arellano MD        sodium chloride (PF) 0.9% PF flush 3 mL  3 mL Intracatheter Q8H Bridgett Vazquez PA-C   3 mL at 07/15/24 1716    sodium chloride (PF) 0.9% PF flush 3 mL  3 mL Intracatheter q1 min prn Bridgett Vazquez PA-C        sucralfate (CARAFATE) tablet 1 g  1 g Oral 4x Daily AC & HS Bridgett Vazquez PA-C   1 g at 07/15/24 1715       Social History:  Social History     Socioeconomic History    Marital status:      Spouse name: Not on file    Number of children: Not on file    Years of education: Not on file    Highest education level: Not on file   Occupational History    Not on file   Tobacco Use    Smoking status: Some Days     Current packs/day: 0.25     Types: Cigarettes     Passive exposure: Never    Smokeless tobacco: Never    Tobacco comments:     seen by TTS inpatient on 3/31/22   Vaping Use    Vaping status: Never Used   Substance and Sexual Activity    Alcohol use:  Yes     Comment: Alcoholic Drinks/day: very little    Drug use: No    Sexual activity: Not on file   Other Topics Concern    Not on file   Social History Narrative    Not on file     Social Determinants of Health     Financial Resource Strain: Low Risk  (12/26/2023)    Financial Resource Strain     Within the past 12 months, have you or your family members you live with been unable to get utilities (heat, electricity) when it was really needed?: No   Food Insecurity: Low Risk  (12/26/2023)    Food Insecurity     Within the past 12 months, did you worry that your food would run out before you got money to buy more?: No     Within the past 12 months, did the food you bought just not last and you didn t have money to get more?: No   Transportation Needs: Low Risk  (12/26/2023)    Transportation Needs     Within the past 12 months, has lack of transportation kept you from medical appointments, getting your medicines, non-medical meetings or appointments, work, or from getting things that you need?: No   Physical Activity: Not on file   Stress: Not on file   Social Connections: Not on file   Interpersonal Safety: Low Risk  (4/1/2024)    Interpersonal Safety     Do you feel physically and emotionally safe where you currently live?: Yes     Within the past 12 months, have you been hit, slapped, kicked or otherwise physically hurt by someone?: No     Within the past 12 months, have you been humiliated or emotionally abused in other ways by your partner or ex-partner?: No   Housing Stability: Low Risk  (12/26/2023)    Housing Stability     Do you have housing? : Yes     Are you worried about losing your housing?: No       Family History:  Family History   Problem Relation Age of Onset    Heart Failure Mother     Cancer Other         paternal HX-laryngeal     Alcoholism Sister     No Known Problems Daughter     No Known Problems Maternal Grandmother     No Known Problems Maternal Grandfather     No Known Problems Paternal  "Grandmother     No Known Problems Paternal Grandfather     No Known Problems Maternal Aunt     No Known Problems Paternal Aunt     Alcoholism Sister     Alcoholism Brother     Alcoholism Father     Cancer Paternal Uncle         Gastric-Alcohol    Cancer Paternal Uncle         gastric-Alcohol    Hereditary Breast and Ovarian Cancer Syndrome No family hx of     Breast Cancer No family hx of     Colon Cancer No family hx of     Endometrial Cancer No family hx of     Ovarian Cancer No family hx of        ROS:  Per HPI    Exam:  /56 (BP Location: Left arm)   Pulse 76   Temp 98.1  F (36.7  C) (Oral)   Resp 20   Ht 1.651 m (5' 5\")   Wt 65.3 kg (143 lb 14.4 oz)   LMP  (LMP Unknown)   SpO2 90%   BMI 23.95 kg/m    General: Alert, interactive, & in NAD  Resp: NLB, some decreased sounds at right apex, thora site c/d/i  Cardiac: Regular rate; extremities warm;     Labs:  All laboratory data reviewed    Imaging:   XR reviewed    --    Ian Solomon MD      8:48 PM, 7/15/2024          "

## 2024-07-16 NOTE — PLAN OF CARE
Goal Outcome Evaluation:      Plan of Care Reviewed With: patient  Patient is alert and oriented and able to communicate her needs to staff. Supplemental Oxygen was weaned to 3 LPM per nasal cannula. This is her baseline at home. Patient was NPO for possible placement of a chest tube.Chest tube was never placed and a Regular diet was ordered. Patient declined her Miralax medication and suppository, and flet she could have  BM on her own, now that she was able to eat solid food. Patient denies any c/o pain this shift, but could feel where her back was tapped for the Pleural Effusion . Patient is voiding without any problems. Blood sugar was 83 for ellis. Will continue to monitor.

## 2024-07-16 NOTE — PROGRESS NOTES
PULMONARY / CRITICAL CARE PROGRESS NOTE    Date / Time of Admission:  7/12/2024  7:58 PM    Assessment:   Principal Problem:    Acute on chronic respiratory failure with hypoxemia (H)  Active Problems:    Nicotine Dependence    Fibromyalgia    Atrial fibrillation, unspecified type (H)    Acute on chronic respiratory failure with hypoxia (H)    Aortic stenosis, severe    Pneumonia due to 2019 novel coronavirus      Code Status:  Full Code    74yoF with emphysema on home O2, afib on Eliquis here with hypoxia in setting of RUL collapse and chronic moderate right pleural effusion.     Plan:   Pulmonary:  1) Chronic moderate right-sided pleural effusion  2) Underlying emphysema  3) Acute on chronic hypoxic respiratory failure  4) RUL collapse, underwent a thoracentesis and developed a post procedure pneumothorax, oxygen requirements still higher than her baseline but acceptable. PTX no larger.  -Bronchial hygiene with Volera.   -Duoneb and mucomyst  -Will manage PTX conservatively.  -Down to low-flow; continue attempting to wean oxygen to maintain saturations>96% (to help with pneumothorax).  -Smoking cessation.   -Decadron for 10 days  -Remdesivir for 5 days, last day 7/16.  - CXR in the AM.     Charlotte Paredes MD  Pulmonary and Critical Care  (P) [phone]      Subjective:   HPI:  Mary Kay Tejada is a 74 year old female with history of emphysema on home O2, DMII afib on anticoagulation who presents with hypoxia found to be COVID +. Reports fever and chills at home.     She was initiated on remdesivir and decadron in ED but overnight worsening hypoxia requiring high-flow. This was weaned off and RUL improved but then worsened with bronchial hygiene.     Back up to 5lpm    Principal Problem:    Acute on chronic respiratory failure with hypoxemia (H)  Active Problems:    Nicotine Dependence    Fibromyalgia    Atrial fibrillation, unspecified type (H)    Acute on chronic respiratory failure with hypoxia (H)    Aortic  stenosis, severe    Pneumonia due to 2019 novel coronavirus      Allergies: Celebrex [celecoxib] and Latex     MEDS:  Scheduled Meds:  Current Facility-Administered Medications   Medication Dose Route Frequency Provider Last Rate Last Admin    acetylcysteine (MUCOMYST) 20 % nebulizer solution 2 mL  2 mL Nebulization 4x Daily Carmelita Buckley MD   2 mL at 07/16/24 0758    apixaban ANTICOAGULANT (ELIQUIS) tablet 5 mg  5 mg Oral BID Bridgett Vazquez PA-C   5 mg at 07/14/24 2129    atorvastatin (LIPITOR) tablet 20 mg  20 mg Oral At Bedtime Bridgett Vazquez PA-C   20 mg at 07/15/24 2053    bisacodyl (DULCOLAX) suppository 10 mg  10 mg Rectal Once Johnny Arellano MD        [Held by provider] carbamide peroxide (DEBROX) 6.5 % otic solution 5 drop  5 drop Both Ears BID Bridgett Vazquez PA-C        cefTRIAXone (ROCEPHIN) 1 g vial to attach to  mL bag for ADULTS or NS 50 mL bag for PEDS  1 g Intravenous Q24H Carmelita Buckley MD   1 g at 07/15/24 1715    dexAMETHasone (DECADRON) tablet 6 mg  6 mg Oral Daily Bridgett Vazquez PA-C   6 mg at 07/15/24 1715    diltiazem ER COATED BEADS (CARDIZEM CD/CARTIA XT) 24 hr capsule 120 mg  120 mg Oral Daily Lisette Boucher MD   120 mg at 07/14/24 0934    furosemide (LASIX) tablet 40 mg  40 mg Oral Daily Bridgett Vazquez PA-C   40 mg at 07/14/24 0934    gabapentin (NEURONTIN) tablet 600 mg  600 mg Oral TID Bridgett Vazquez PA-C   600 mg at 07/14/24 2129    guaiFENesin (MUCINEX) 12 hr tablet 600 mg  600 mg Oral BID Bridgett Vazquez PA-C   600 mg at 07/15/24 2053    insulin aspart (NovoLOG) injection (RAPID ACTING)  1-3 Units Subcutaneous 4x Daily AC & HS Johnny Arellano MD   1 Units at 07/15/24 2202    ipratropium - albuterol 0.5 mg/2.5 mg/3 mL (DUONEB) neb solution 3 mL  3 mL Nebulization 4x daily Carmelita Buckley MD   3 mL at 07/16/24 075    metoprolol tartrate (LOPRESSOR) tablet 25 mg  25 mg Oral BID Bridgett Vazquez PA-C   25 mg at 07/14/24 9849     pantoprazole (PROTONIX) EC tablet 40 mg  40 mg Oral BID Bridgett Vazquez PA-C   40 mg at 07/14/24 2130    polyethylene glycol (MIRALAX) Packet 17 g  17 g Oral BID Johnny Arellano MD   17 g at 07/15/24 2053    remdesivir 100 mg in sodium chloride 0.9 % 250 mL intermittent infusion  100 mg Intravenous Q24H Johnny Arellano  mL/hr at 07/15/24 2105 100 mg at 07/15/24 2105    And    sodium chloride 0.9% BOLUS 50 mL  50 mL Intravenous Q24H Johnny Arellano  mL/hr at 07/15/24 2106 50 mL at 07/15/24 2106    sennosides (SENOKOT) tablet 8.6 mg  8.6 mg Oral BID Johnny Arellano MD        sodium chloride (PF) 0.9% PF flush 3 mL  3 mL Intracatheter Q8H Bridgett Vazquez PA-C   3 mL at 07/15/24 1716    sucralfate (CARAFATE) tablet 1 g  1 g Oral 4x Daily AC & HS Bridgett Vazquez PA-C   1 g at 07/15/24 2053     Continuous Infusions:  Current Facility-Administered Medications   Medication Dose Route Frequency Provider Last Rate Last Admin    Patient is already receiving anticoagulation with heparin, enoxaparin (LOVENOX), warfarin (COUMADIN)  or other anticoagulant medication   Does not apply Continuous PRN Bridgett Vazquez PA-C         PRN Meds:.  Current Facility-Administered Medications   Medication Dose Route Frequency Provider Last Rate Last Admin    acetaminophen (TYLENOL) tablet 650 mg  650 mg Oral Q4H PRN Bridgett Vazquez PA-C        Or    acetaminophen (TYLENOL) Suppository 650 mg  650 mg Rectal Q4H PRN Bridgett Vazquez PA-C        albuterol (PROVENTIL HFA/VENTOLIN HFA) inhaler  2 puff Inhalation Q4H PRN Bridgett Vazquez PA-C   2 puff at 07/15/24 0652    calcium carbonate (TUMS) chewable tablet 1,000 mg  1,000 mg Oral 4x Daily PRN Ignaszewski, Bridgett D, PA-C        glucose gel 15-30 g  15-30 g Oral Q15 Min PRN Bridgett Vazquez PA-C        Or    dextrose 50 % injection 25-50 mL  25-50 mL Intravenous Q15 Min PRN Bridgett Vazquez PA-C        Or    glucagon injection 1  "mg  1 mg Subcutaneous Q15 Min PRN Bridgett Vazquez PA-C        ipratropium - albuterol 0.5 mg/2.5 mg/3 mL (DUONEB) neb solution 3 mL  3 mL Nebulization Q4H PRN Lisette Buocher MD        lidocaine (LMX4) cream   Topical Q1H PRN Bridgett Vazquez PA-C        lidocaine 1 % 0.1-1 mL  0.1-1 mL Other Q1H PRN Bridgett Vazquez PA-C        naloxone (NARCAN) nasal spray 4 mg  4 mg Alternating Nostrils Once PRN Bridgett Vazquez PA-C        ondansetron (ZOFRAN ODT) ODT tab 4 mg  4 mg Oral Q6H PRN Bridgett Vazquez PA-C        Or    ondansetron (ZOFRAN) injection 4 mg  4 mg Intravenous Q6H PRN Bridgett Vazquez PA-C        oxyCODONE (ROXICODONE) tablet 10 mg  10 mg Oral Q6H PRN Bridgett Vazquez PA-C        Patient is already receiving anticoagulation with heparin, enoxaparin (LOVENOX), warfarin (COUMADIN)  or other anticoagulant medication   Does not apply Continuous PRN Bridgett Vazquez PA-C        senna-docusate (SENOKOT-S/PERICOLACE) 8.6-50 MG per tablet 1 tablet  1 tablet Oral BID PRN Bridgett Vazquez PA-C   1 tablet at 07/15/24 1715    Or    senna-docusate (SENOKOT-S/PERICOLACE) 8.6-50 MG per tablet 2 tablet  2 tablet Oral BID PRN Bridgett Vazquez PA-C        sodium chloride (PF) 0.9% PF flush 3 mL  3 mL Intracatheter q1 min prn Bridgett Vazquez PA-C             Objective:   VITALS:  /59 (BP Location: Left arm)   Pulse 70   Temp 98  F (36.7  C) (Oral)   Resp 18   Ht 1.651 m (5' 5\")   Wt 65.3 kg (143 lb 14.4 oz)   LMP  (LMP Unknown)   SpO2 93%   BMI 23.95 kg/m    EXAM:  General appearance: alert, appears stated age, and cooperative  Neck: no adenopathy and supple, symmetrical, trachea midline  Lungs: clear to auscultation bilaterally but minimal air movement.   Heart: irregularly irregular  Abdomen: soft, non-tender; bowel sounds normal; no masses,  no organomegaly  Extremities: no edema  Skin: Skin color, texture, turgor normal. No rashes or " lesions  Neurologic: Grossly normal    I&O:     Intake/Output Summary (Last 24 hours) at 7/15/2024 1119  Last data filed at 7/15/2024 0633  Gross per 24 hour   Intake 1095 ml   Output 2425 ml   Net -1330 ml         Data Review:  Chest Xray  Personally reviewed image/s and Personally reviewed impression/s  EXAM: XR CHEST PORT 1 VIEW  LOCATION: [facility]  DATE: 7/14/2024     INDICATION: Evaluate partial collapse  COMPARISON: 7/13/2024     IMPRESSION: Increased atelectasis of the RIGHT lung with rightward shift of the mediastinum. Small right pleural effusion. Clear left lung. Left subclavian approach pacing device. Cardiac silhouette within normal limits. Prosthetic aortic valve.    LABS      Latest Ref Rng & Units 7/12/2024     8:30 PM 7/13/2024     6:29 AM 7/14/2024     8:29 AM   Glucose Values   Glucose 70 - 99 mg/dL 188  161  100        Results for orders placed or performed during the hospital encounter of 07/12/24   Basic metabolic panel   Result Value Ref Range    Sodium 141 135 - 145 mmol/L    Potassium 4.8 3.4 - 5.3 mmol/L    Chloride 103 98 - 107 mmol/L    Carbon Dioxide (CO2) 32 (H) 22 - 29 mmol/L    Anion Gap 6 (L) 7 - 15 mmol/L    Urea Nitrogen 29.0 (H) 8.0 - 23.0 mg/dL    Creatinine 0.79 0.51 - 0.95 mg/dL    GFR Estimate 78 >60 mL/min/1.73m2    Calcium 8.8 8.8 - 10.2 mg/dL    Glucose 100 (H) 70 - 99 mg/dL     Lab Results   Component Value Date    WBC 5.8 07/15/2024    HGB 10.5 (L) 07/15/2024    HCT 35.6 07/15/2024    MCV 84 07/15/2024     (L) 07/15/2024       ABG:  Recent Labs   Lab 07/13/24 0316 07/12/24 2030   O2PER 0 36       Charlotte Paredes MD  Pulmonary and Critical Care  (P) [phone]

## 2024-07-16 NOTE — PROGRESS NOTES
Pt remained on 5-6L NC with SAT in low 90's. Took her scheduled nebs. BS clear/diminished. Volara not done due to pneumothorax. RT will continue to follow pt.

## 2024-07-16 NOTE — PROGRESS NOTES
RN communicated to RT that Per MD percussive therapy should not be done on pt due to pt developing Pneumothorax. RT will continue to monitor pt.     Ghassan Washburn, RRT

## 2024-07-16 NOTE — PLAN OF CARE
Problem: Respiratory Compromise (Pneumothorax)  Goal: Optimal Oxygenation and Ventilation  Outcome: Unable to Meet   Goal Outcome Evaluation:  Pt had thoracentesis today, post xray completed. Urgent message by Radiology relayed to MD. MD and RN discussed interventions for patient safety. Patient updated at bedside with son, pt gave consent to update. Son staying until chest tube placed. MD 1900 called to update Rn about chest tube placement, education, interventions in preparation for chest tube. SWAT notified of chest tube, charge RN. Pt educated about call light importance, and s/s of decompensation notification importance.     Patient and son state that they understand interventions thus far.     Plan: general surgery consult, chest tube placement, Xray at 9pm.

## 2024-07-16 NOTE — PROGRESS NOTES
Pt took her scheduled nebs but did not do Volara. Pt said she discussed with MD earlier and was told she did not need to do volara tonight.

## 2024-07-17 ENCOUNTER — APPOINTMENT (OUTPATIENT)
Dept: RADIOLOGY | Facility: CLINIC | Age: 74
DRG: 177 | End: 2024-07-17
Payer: COMMERCIAL

## 2024-07-17 ENCOUNTER — PATIENT OUTREACH (OUTPATIENT)
Dept: GERIATRIC MEDICINE | Facility: CLINIC | Age: 74
End: 2024-07-17

## 2024-07-17 VITALS
SYSTOLIC BLOOD PRESSURE: 95 MMHG | OXYGEN SATURATION: 90 % | RESPIRATION RATE: 16 BRPM | DIASTOLIC BLOOD PRESSURE: 59 MMHG | HEIGHT: 65 IN | HEART RATE: 63 BPM | TEMPERATURE: 97.9 F | WEIGHT: 143.9 LBS | BODY MASS INDEX: 23.97 KG/M2

## 2024-07-17 LAB
BACTERIA BLD CULT: NO GROWTH
BACTERIA BLD CULT: NO GROWTH
GLUCOSE BLDC GLUCOMTR-MCNC: 100 MG/DL (ref 70–99)
GLUCOSE BLDC GLUCOMTR-MCNC: 148 MG/DL (ref 70–99)

## 2024-07-17 PROCEDURE — 99238 HOSP IP/OBS DSCHRG MGMT 30/<: CPT | Performed by: HOSPITALIST

## 2024-07-17 PROCEDURE — 94640 AIRWAY INHALATION TREATMENT: CPT | Mod: 76

## 2024-07-17 PROCEDURE — 250N000009 HC RX 250: Performed by: INTERNAL MEDICINE

## 2024-07-17 PROCEDURE — 94640 AIRWAY INHALATION TREATMENT: CPT

## 2024-07-17 PROCEDURE — 250N000013 HC RX MED GY IP 250 OP 250 PS 637

## 2024-07-17 PROCEDURE — 71045 X-RAY EXAM CHEST 1 VIEW: CPT

## 2024-07-17 PROCEDURE — 250N000013 HC RX MED GY IP 250 OP 250 PS 637: Performed by: INTERNAL MEDICINE

## 2024-07-17 PROCEDURE — 99231 SBSQ HOSP IP/OBS SF/LOW 25: CPT

## 2024-07-17 PROCEDURE — 99232 SBSQ HOSP IP/OBS MODERATE 35: CPT | Performed by: INTERNAL MEDICINE

## 2024-07-17 PROCEDURE — 999N000157 HC STATISTIC RCP TIME EA 10 MIN

## 2024-07-17 RX ORDER — CEFDINIR 300 MG/1
300 CAPSULE ORAL 2 TIMES DAILY
Qty: 8 CAPSULE | Refills: 0 | Status: SHIPPED | OUTPATIENT
Start: 2024-07-17 | End: 2024-07-21

## 2024-07-17 RX ORDER — CEFDINIR 300 MG/1
300 CAPSULE ORAL 2 TIMES DAILY
Status: DISCONTINUED | OUTPATIENT
Start: 2024-07-17 | End: 2024-07-17 | Stop reason: HOSPADM

## 2024-07-17 RX ORDER — POLYETHYLENE GLYCOL 3350 17 G/17G
17 POWDER, FOR SOLUTION ORAL DAILY PRN
COMMUNITY
Start: 2024-07-17

## 2024-07-17 RX ORDER — SENNOSIDES 8.6 MG
1 TABLET ORAL 2 TIMES DAILY
COMMUNITY
Start: 2024-07-17

## 2024-07-17 RX ADMIN — ACETYLCYSTEINE 2 ML: 200 SOLUTION ORAL; RESPIRATORY (INHALATION) at 07:25

## 2024-07-17 RX ADMIN — PANTOPRAZOLE SODIUM 40 MG: 40 TABLET, DELAYED RELEASE ORAL at 09:03

## 2024-07-17 RX ADMIN — GABAPENTIN 600 MG: 600 TABLET, FILM COATED ORAL at 09:02

## 2024-07-17 RX ADMIN — ACETYLCYSTEINE 2 ML: 200 SOLUTION ORAL; RESPIRATORY (INHALATION) at 11:16

## 2024-07-17 RX ADMIN — APIXABAN 5 MG: 5 TABLET, FILM COATED ORAL at 09:03

## 2024-07-17 RX ADMIN — GUAIFENESIN 600 MG: 600 TABLET ORAL at 09:03

## 2024-07-17 RX ADMIN — IPRATROPIUM BROMIDE AND ALBUTEROL SULFATE 3 ML: .5; 3 SOLUTION RESPIRATORY (INHALATION) at 11:16

## 2024-07-17 RX ADMIN — GABAPENTIN 600 MG: 600 TABLET, FILM COATED ORAL at 13:05

## 2024-07-17 RX ADMIN — METOPROLOL TARTRATE 25 MG: 25 TABLET, FILM COATED ORAL at 09:03

## 2024-07-17 RX ADMIN — SUCRALFATE 1 G: 1 TABLET ORAL at 13:05

## 2024-07-17 RX ADMIN — SUCRALFATE 1 G: 1 TABLET ORAL at 09:03

## 2024-07-17 RX ADMIN — IPRATROPIUM BROMIDE AND ALBUTEROL SULFATE 3 ML: .5; 3 SOLUTION RESPIRATORY (INHALATION) at 07:25

## 2024-07-17 RX ADMIN — FUROSEMIDE 40 MG: 40 TABLET ORAL at 09:03

## 2024-07-17 RX ADMIN — DILTIAZEM HYDROCHLORIDE 120 MG: 120 CAPSULE, COATED, EXTENDED RELEASE ORAL at 09:03

## 2024-07-17 ASSESSMENT — ACTIVITIES OF DAILY LIVING (ADL)
ADLS_ACUITY_SCORE: 27

## 2024-07-17 NOTE — PLAN OF CARE
Problem: Gas Exchange Impaired  Goal: Optimal Gas Exchange  Outcome: Progressing   Goal Outcome Evaluation:  Pt on baseline oxygen 3L per n/c .  Lungs diminished; pt slight SOB with exertion but no worsening SOB.  Pt feels she's ready to go home;  discharge orders in ; await final OK from MD to discharge home.

## 2024-07-17 NOTE — PROGRESS NOTES
Pt discharged home via family. She was sent home with oxygen that family brought for her from home.

## 2024-07-17 NOTE — DISCHARGE SUMMARY
[facility]  Hospitalist Discharge Summary      Date of Admission:  7/12/2024  Date of Discharge:  7/17/2024  Discharging Provider: Johnny Arellano MD  Discharge Service: Hospitalist Service    Discharge Diagnoses   Acute on chronic hypoxic and hypercarbic respiratory failure  Covid19  Community acquired bacterial pneumonia  Pneumothorax  Constipation    Clinically Significant Risk Factors          Follow-ups Needed After Discharge   Follow-up Appointments     Follow-up and recommended labs and tests       Follow up with primary care provider, SAVANA SILVER,   within 7 days for hospital follow- up.  The following labs/tests are   recommended:  - repeat chest CT in one month to assess for etiology to right upper lobe   collapse            Unresulted Labs Ordered in the Past 30 Days of this Admission       Date and Time Order Name Status Description    7/15/2024 11:16 AM Pleural fluid Aerobic Bacterial Culture Routine With Gram Stain Preliminary     7/12/2024 10:17 PM Blood Culture Line, venous Preliminary     7/12/2024 10:17 PM Blood Culture Wrist, Right Preliminary         These results will be followed up by PCP    Discharge Disposition   Discharged to home  Condition at discharge: Stable    Hospital Course   The patient was admitted with acute on chronic hypoxic and hypercarbic respiratory failure in the setting of covid19 and concomitant bacterial pneumonia.  She was treated with steroids, remdesivir, and antibiotics.  She had associated right upper lobe collapse and underwent thoracentesis for a pleural effusion, which has been recurrent.  Her procedure was complicated by pneumothorax.  She did not require a chest tube.  The pneumothorax improved by discharge and the patient was able to be weaned to her home oxygen requirement.  She was therefore discharged home.    Consultations This Hospital Stay   PHARMACY TO [facility]  PHYSICAL THERAPY ADULT IP CONSULT  PULMONARY IP  CONSULT  CARE MANAGEMENT / SOCIAL WORK IP CONSULT  SMOKING CESSATION PROGRAM IP CONSULT  INTERVENTIONAL RADIOLOGY ADULT/PEDS IP CONSULT  SURGERY GENERAL IP CONSULT    Code Status   Full Code    Time Spent on this Encounter   I, Johnny Arellano MD, personally saw the patient today and spent less than or equal to 30 minutes discharging this patient.       Johnny Arellano MD  [facility] ICU  [address] [address]  Phone: [phone]  Fax: [phone]  ______________________________________________________________________    Physical Exam   Vital Signs: Temp: 97.9  F (36.6  C) Temp src: Oral BP: 95/59 Pulse: 63   Resp: 18 SpO2: 90 % O2 Device: Nasal cannula Oxygen Delivery: 3 LPM  Weight: 143 lbs 14.4 oz    See progress note       Primary Care Physician   SAVANA SILVER    Discharge Orders      Reason for your hospital stay    You were admitted to the hospital with increased oxygen needs due to both bacterial and viral pneumonia. You were diagnosed with covid19.  You were treated with antibiotics and had improvement by discharge.  You also had fluid around your lung which was removed with a procedure.     Follow-up and recommended labs and tests     Follow up with primary care provider, SAVANA SILVER, within 7 days for hospital follow- up.  The following labs/tests are recommended:  - repeat chest CT in one month to assess for etiology to right upper lobe collapse     Activity    Your activity upon discharge: activity as tolerated     Oxygen Adult/Peds    Oxygen Documentation  I certify that this patient, Mary Kay Tejada has been under my care (or a nurse practitioner or physican's assistant working with me). This is the face-to-face encounter for oxygen medical necessity.      At the time of this encounter, I have reviewed the qualifying testing and have determined that supplemental oxygen is reasonable and necessary and is  expected to improve the patient's condition in a home setting.         Patient has continued oxygen desaturation due to COPD J44.9.    If portability is ordered, is the patient mobile within the home? yes    Was this visit performed as a telehealth visit: No     Diet    Follow this diet upon discharge: Orders Placed This Encounter      Fluid restriction 1800 ML FLUID      Regular Diet Adult       Significant Results and Procedures   Most Recent 3 CBC's:  Recent Labs   Lab Test 07/15/24  0451 07/13/24  0629 07/12/24 2030   WBC 5.8 3.0* 4.7   HGB 10.5* 10.9* 11.5*   MCV 84 85 85   * 107* 125*     Most Recent 3 BMP's:  Recent Labs   Lab Test 07/17/24  0756 07/16/24  2109 07/16/24  1626 07/14/24  1205 07/14/24  0829 07/13/24  0754 07/13/24  0629 07/13/24 0152 07/12/24 2030   NA  --   --   --   --  141  --  141  --  140   POTASSIUM  --   --   --   --  4.8  --  4.6  --  4.1   CHLORIDE  --   --   --   --  103  --  102  --  100   CO2  --   --   --   --  32*  --  32*  --  35*   BUN  --   --   --   --  29.0*  --  19.4  --  18.9   CR  --   --   --   --  0.79  --  0.87  --  0.96*   ANIONGAP  --   --   --   --  6*  --  7  --  5*   JOEY  --   --   --   --  8.8  --  8.8  --  9.2   * 330* 129*   < > 100*   < > 161*   < > 188*    < > = values in this interval not displayed.   ,   Results for orders placed or performed during the hospital encounter of 07/12/24   CT Chest Pulmonary Embolism w Contrast    Narrative    EXAM: CT CHEST PULMONARY EMBOLISM W CONTRAST  LOCATION: [facility]  DATE: 7/12/2024    INDICATION: covid positive, hypoxia, eval for pneumonia, PE  COMPARISON: 2/6/2024  TECHNIQUE: CT chest pulmonary angiogram during arterial phase injection of IV contrast. Multiplanar reformats and MIP reconstructions were performed. Dose reduction techniques were used.   CONTRAST: 75 ML ISOVUE 370    FINDINGS:  ANGIOGRAM CHEST: No pulmonary artery embolism.    LUNGS AND PLEURA: Since February  2024, development of right upper lobe collapse with right upper lobe airway becoming completely opacified shortly after its origin. No significant change of middle and right lower lobe volume loss with patent middle and   right lower lobe airways. Moderate right pleural effusion. No pneumothorax.    MEDIASTINUM/AXILLAE: Compromised evaluation of right perihilar adenopathy from adjacent volume loss. No significant mediastinal or left perihilar adenopathy. Normal heart size. No pericardial effusion. Aortic valvular prosthetic. Dual-chamber pacer.   Patulous esophagus.    CORONARY ARTERY CALCIFICATION: Moderate.     UPPER ABDOMEN: No actionable findings.    MUSCULOSKELETAL: Bony demineralization and degenerative changes.      Impression    IMPRESSION:    1.  No pulmonary embolism.    2.  Interval complete right upper lobe collapse with opacification of the proximal right upper lobe airway of indeterminate etiology. Recommend pulmonary follow-up and direct visualization or repeat chest CT to exclude an obstructing lesion. Retained   airway debris.    3.  No significant change of moderate right pleural effusion, of indeterminate etiology.    4.  Moderately advanced emphysema.     XR Chest Port 1 View    Narrative    EXAM: XR CHEST PORT 1 VIEW  LOCATION: [facility]  DATE: 7/13/2024    INDICATION: eval for interval change in lung collapse  COMPARISON: CT chest 7/12/2024      Impression    IMPRESSION: Similar moderate right pleural effusion with adjacent opacity favoring atelectasis. Given differences in technique and single AP view, right upper lung aeration appears improved. Left lung remains clear. Cardiomediastinal silhouette, left   chest wall cardiac device and TAVR.   XR Chest Port 1 View    Addendum: 7/14/2024    EXAM: XR CHEST PORT 1 VIEW  LOCATION: [facility]  DATE: 7/14/2024    INDICATION: Evaluate partial collapse  COMPARISON: 7/13/2024    IMPRESSION: Increased  atelectasis of the RIGHT lung with rightward shift of the mediastinum. Small right pleural effusion. Clear left lung. Left subclavian approach pacing device. Cardiac silhouette within normal limits. Prosthetic aortic valve.        Narrative    EXAM: XR CHEST PORT 1 VIEW  LOCATION: [facility]  DATE: 7/14/2024    INDICATION: Evaluate partial collapse  COMPARISON: 7/13/2024      Impression    IMPRESSION: Increased atelectasis of the left lung with rightward shift of the mediastinum. Small right pleural effusion. Clear left lung. Left subclavian approach pacing device. Cardiac silhouette within normal limits. Prosthetic aortic valve.   US Thoracentesis    Narrative    EXAM:   1. RIGHT THORACENTESIS  2. ULTRASOUND GUIDANCE  LOCATION: [facility]  DATE: 7/15/2024    INDICATION: Pleural effusion.    PROCEDURE: Informed consent obtained. Time out performed. The chest was prepped and draped in sterile fashion. 10 mL of 1 % lidocaine was infused into the local soft tissues. Under direct ultrasound guidance, a 5 Bulgarian catheter system was placed into   the pleural effusion.     0.9 liters of anders-colored fluid were removed and sent to lab, if requested.    Patient tolerated procedure well.    Ultrasound imaging was obtained and placed in the patient's permanent medical record.      Impression    IMPRESSION:  Status post right ultrasound-guided thoracentesis.    Reference CPT Code: 63457   XR Chest Port 1 View    Narrative    EXAM: XR CHEST PORT 1 VIEW  LOCATION: [facility]  DATE: 7/15/2024    INDICATION: Right upper lobe collapse.  COMPARISON: 7/14/2024.      Impression    IMPRESSION: New, small to moderate-sized right pneumothorax, greatest at the peripheral right lung base. Previously large right pleural effusion is no longer present. Small amount of residual pleural fluid versus airspace disease is present at the right   lung base. Continued  imaging follow-up to resolution is recommended.     No left pleural effusion or pneumothorax.    Upper limits of normal heart size. Left chest wall cardiac implantable electronic device.    Critical Result: Pneumothorax (new, increasing or tension)    Finding was identified on 7/15/2024 5:55 PM CDT.    Dr. Mendoza was contacted by me on 7/15/2024 6:25 PM CDT and verbalized understanding of the critical result.          XR Chest Port 1 View    Narrative    EXAM: XR CHEST PORT 1 VIEW  LOCATION: [facility]  DATE: 7/15/2024    INDICATION: follow up pneumothorax  COMPARISON: 7/15/2024 at 5:34 PM and 7/14/2024.      Impression    IMPRESSION: Small to moderate size right pneumothorax not significantly changed from the study at 5:34 PM today. Stable focal opacity at the right base which may be a small amount of residual pleural effusion, atelectasis and/or infiltrate. Mild shift of   mediastinal structures to the right as before. Stable mild cardiomegaly. Normal pulmonary vascularity. Left subclavian pacemaker unchanged.   XR Chest Port 1 View    Narrative    EXAM: XR CHEST PORT 1 VIEW  LOCATION: [facility]  DATE: 7/16/2024    INDICATION: Follow up PTX  COMPARISON: July 15, 2024 2107 hours and other recent prior exams.      Impression    IMPRESSION: Cardiac pacing device is again observed along with mediastinal shift toward the right. There is a subpulmonic right pneumothorax which appears unchanged or slightly smaller than on x-ray yesterday. The left lung is clear and free of   pneumothorax.   XR Chest Port 1 View    Narrative    EXAM: XR CHEST PORT 1 VIEW  LOCATION: [facility]  DATE: 7/17/2024    INDICATION: Recheck pneumothorax  COMPARISON: Portable chest radiograph 07/16/2024      Impression    IMPRESSION: Stable position of left chest pacemaker. Prior aortic valve replacement. Slightly decreased size of right hilar pneumothorax measuring 8 mm  currently, previously 15 mm. Small right pleural effusion with adjacent compressive atelectasis.   Streaky opacities at the left lung base, favoring atelectasis. Stable mildly enlarged cardiomediastinal silhouette. Mild central pulmonary vascular congestion. Unchanged osseous structures.     *Note: Due to a large number of results and/or encounters for the requested time period, some results have not been displayed. A complete set of results can be found in Results Review.       Discharge Medications   Current Discharge Medication List        START taking these medications    Details   cefdinir (OMNICEF) 300 MG capsule Take 1 capsule (300 mg) by mouth 2 times daily for 4 days  Qty: 8 capsule, Refills: 0    Associated Diagnoses: Community acquired pneumonia, unspecified laterality      polyethylene glycol (MIRALAX) 17 GM/Dose powder Take 17 g by mouth daily as needed for constipation    Associated Diagnoses: Constipation, unspecified constipation type      sennosides (SENOKOT) 8.6 MG tablet Take 1 tablet by mouth 2 times daily    Associated Diagnoses: Constipation, unspecified constipation type           CONTINUE these medications which have NOT CHANGED    Details   albuterol (PROAIR HFA/PROVENTIL HFA/VENTOLIN HFA) 108 (90 Base) MCG/ACT inhaler INHALE 2 PUFFS INTO THE LUNGS EVERY 4 HOURS AS NEEDED FOR WHEEZING  Qty: 13.4 g, Refills: 1    Comments: Pharmacy may dispense brand covered by insurance (Proair, or proventil or ventolin or generic albuterol inhaler)  Associated Diagnoses: Chronic obstructive pulmonary disease with acute lower respiratory infection (H)      apixaban ANTICOAGULANT (ELIQUIS) 5 MG tablet Take 1 tablet (5 mg) by mouth 2 times daily  Qty: 180 tablet, Refills: 2    Comments: Resume evening of 2/23  Associated Diagnoses: Atrial fibrillation, unspecified type (H)      atorvastatin (LIPITOR) 20 MG tablet TAKE 1 TABLET(20 MG) BY MOUTH AT BEDTIME  Qty: 90 tablet, Refills: 2    Associated Diagnoses:  Mixed hyperlipidemia      budesonide-formoterol (SYMBICORT) 160-4.5 MCG/ACT Inhaler INHALE 2 PUFFS INTO THE LUNGS TWICE DAILY  Qty: 10.2 g, Refills: 4    Associated Diagnoses: Pulmonary emphysema, unspecified emphysema type (H)      carbamide peroxide (DEBROX) 6.5 % otic solution Place 5 drops into both ears 2 times daily  Qty: 15 mL, Refills: 1    Associated Diagnoses: Impacted cerumen, bilateral      diltiazem ER COATED BEADS (CARDIZEM CD/CARTIA XT) 120 MG 24 hr capsule TAKE 1 CAPSULE(120 MG) BY MOUTH DAILY  Qty: 30 capsule, Refills: 5    Associated Diagnoses: Chronic heart failure with preserved ejection fraction (H)      Emollient (EUCERIN ORIGINAL HEALING) LOTN APPLY LIBERALLY TO DRY SKIN TWICE DAILY AS NEEDED      empagliflozin (JARDIANCE) 10 MG TABS tablet Take 1 tablet (10 mg) by mouth daily  Qty: 90 tablet, Refills: 2    Associated Diagnoses: Acute on chronic diastolic heart failure (H); Type 2 diabetes mellitus with hypoglycemia without coma, without long-term current use of insulin (H)      furosemide (LASIX) 20 MG tablet Take 2 tablets (40 mg) by mouth daily  Qty: 180 tablet, Refills: 1    Comments: Dose increased 4/29  Associated Diagnoses: Acute on chronic diastolic heart failure (H)      gabapentin (NEURONTIN) 600 MG tablet TAKE 1 TABLET(600 MG) BY MOUTH THREE TIMES DAILY  Qty: 270 tablet, Refills: 2    Associated Diagnoses: Type 2 diabetes mellitus with hypoglycemia without coma, with long-term current use of insulin (H)      ipratropium - albuterol 0.5 mg/2.5 mg/3 mL (DUONEB) 0.5-2.5 (3) MG/3ML neb solution INHALE 1 VIAL VIA NEBULIZER EVERY 6 HOURS AS NEEDED FOR SHORTNESS OF BREATH OR WHEEZING  Qty: 90 mL, Refills: 0    Associated Diagnoses: COPD exacerbation (H)      meclizine (ANTIVERT) 25 MG tablet TAKE 1 TABLET(25 MG) BY MOUTH THREE TIMES DAILY AS NEEDED FOR DIZZINESS OR NAUSEA  Qty: 45 tablet, Refills: 5    Associated Diagnoses: Vertigo      metoprolol tartrate (LOPRESSOR) 25 MG tablet Take 1  tablet (25 mg) by mouth 2 times daily  Qty: 60 tablet, Refills: 3    Associated Diagnoses: Complete atrioventricular block (H)      naloxone (NARCAN) 4 MG/0.1ML nasal spray Spray 1 spray (4 mg) into one nostril alternating nostrils once as needed for opioid reversal every 2-3 minutes until assistance arrives  Qty: 0.2 mL, Refills: 0    Associated Diagnoses: Trigger point of thoracic region; Chronic pain syndrome      Nutritional Supplements (ENSURE COMPLETE) LIQD Take 1 Can by mouth daily  Qty: 7110 mL, Refills: 11    Associated Diagnoses: Severe protein-calorie malnutrition (H24)      nystatin (NYSTOP) 835331 UNIT/GM external powder APPLY TO THE AFFECTED AREA(S) 2-3 TIMES DAILY AS NEEDED  Qty: 180 g, Refills: 0    Comments: **Patient requests 90 days supply**  Associated Diagnoses: Candidal intertrigo      omeprazole (PRILOSEC) 20 MG DR capsule TAKE 1 CAPSULE(20 MG) BY MOUTH TWICE DAILY  Qty: 180 capsule, Refills: 1    Associated Diagnoses: Type 2 diabetes mellitus with hypoglycemia without coma, without long-term current use of insulin (H)      oxyCODONE IR (ROXICODONE) 10 MG tablet Take 1 tablet (10 mg) by mouth every 6 hours as needed for moderate to severe pain  Qty: 120 tablet, Refills: 0    Associated Diagnoses: Chronic pain syndrome; Fibromyalgia      potassium chloride ER (KLOR-CON M) 20 MEQ CR tablet TAKE 1 TABLET(20 MEQ) BY MOUTH DAILY  Qty: 90 tablet, Refills: 3    Associated Diagnoses: Acute and chronic respiratory failure with hypercapnia (H)      sucralfate (CARAFATE) 1 GM tablet CRUSH ONE TABLET AND MIX WITH A LLITTLE WATER AND SWALLOW FOUR TIMES DAILY  Qty: 360 tablet, Refills: 3    Associated Diagnoses: Iron deficiency anemia due to chronic blood loss      ACCU-CHEK SOFTCLIX LANCETS lancets [ACCU-CHEK SOFTCLIX LANCETS LANCETS] TEST THREE TIMES DAILY  Qty: 300 each, Refills: 3    Associated Diagnoses: Type 2 diabetes mellitus with hyperglycemia (H)      BD ULTRA-FINE SHORT PEN NEEDLE 31 gauge x  "5/16\" Ndle [BD ULTRA-FINE SHORT PEN NEEDLE 31 GAUGE X 5/16\" NDLE] TEST FOUR TIMES DAILY WITH MEALS AND AT BEDTIME  Qty: 400 each, Refills: 3    Associated Diagnoses: DM (diabetes mellitus) (H)      !! blood glucose (ONETOUCH VERIO IQ) test strip TEST THREE TIMES DAILY  Qty: 300 strip, Refills: 1    Associated Diagnoses: Type 2 diabetes mellitus with hypoglycemia without coma, without long-term current use of insulin (H)      !! blood-glucose meter (ONETOUCH VERIO IQ METER) Misc [BLOOD-GLUCOSE METER (ONETOUCH VERIO IQ METER) MISC] Check blood sugar three times a day.  Qty: 1 each, Refills: 0    Associated Diagnoses: Type 2 diabetes mellitus with hypoglycemia without coma, without long-term current use of insulin (H)      diaper,brief,adult,disposable (ADULT BRIEFS - LARGE) Misc [DIAPER,BRIEF,ADULT,DISPOSABLE (ADULT BRIEFS - LARGE) MISC] Use 3-4 daily as needed for incontinence  Qty: 120 each, Refills: 6    Associated Diagnoses: Mixed stress and urge urinary incontinence      generic lancets (FINGERSTIX LANCETS) [GENERIC LANCETS (FINGERSTIX LANCETS)] Dispense brand per patient's insurance at pharmacy discretion.  Qty: 300 each, Refills: 0    Comments: Test three times daily.  Associated Diagnoses: Type 2 diabetes mellitus with hyperglycemia, unspecified whether long term insulin use (H)       !! - Potential duplicate medications found. Please discuss with provider.        Allergies   Allergies   Allergen Reactions    Celebrex [Celecoxib] Rash     patient had butterfly rash - \"lupus-like\"      Latex Rash     "

## 2024-07-17 NOTE — PROGRESS NOTES
[facility]    Medicine Progress Note - Hospitalist Service    Date of Admission:  7/12/2024    Assessment & Plan   Mary Kay Tejada is a 74 year old female admitted with acute on chronic hypoxic and hypercarbic respiratory failure in the setting of covid19 and concomitant bacterial pneumonia.  She is overall clinically stable. She is s/p thoracentesis which was complicated by pneumothorax.  Pneumothorax has since improved and the patient is on her baseline oxygen requirement.  Tentative plan for discharge today.    # Acute on chronic hypoxic and hypercarbic respiratory failure 2/2 covid19 (chronic 3L o2 requirement)  # Community acquired pneumonia  # Pleural effusion  # Pneumothorax  - discontinue dexamethasone  - CTX -> cefdinir    # Constipation, resolved  - senna, miralax bid    # DM  - ISS    # Hx severe aortic stenosis s/p TAVR  # HFpEF  # Afib  - furosemide  - apixaban  - diltiazem, metoprolol          Diet: Fluid restriction 1800 ML FLUID  Regular Diet Adult      Chairez Catheter: Not present  Lines: None     Cardiac Monitoring: None  Code Status: Full Code      Clinically Significant Risk Factors                  # Hypertension: Noted on problem list                  # Financial/Environmental Concerns: none   # Pacemaker present       Disposition Plan     Medically Ready for Discharge: Anticipated Today             Johnny Arellano MD  Hospitalist Service  [facility]  Securely message with [employer] (more info)  Text page via [employer] Paging/Directory   ______________________________________________________________________    Interval History   Denies chest pain, chest pressure, dyspnea, or abdominal pain.    Physical Exam   Vital Signs: Temp: 97.4  F (36.3  C) Temp src: Oral BP: 108/58 Pulse: 69   Resp: 16 SpO2: 91 % O2 Device: Nasal cannula Oxygen Delivery: 3 LPM  Weight: 143 lbs 14.4 oz    Gen:  sitting in chair in no extremis  Neuro: alert, conversant  CV:  nl  rate, irregular rhythm  Pulm:  no acute resp distress, ctab  GI:  abdomen NTTP  Ext:  trace lower extremity edema    Medical Decision Making             Data   Reviewed:    CXR  IMPRESSION: Stable position of left chest pacemaker. Prior aortic valve replacement. Slightly decreased size of right hilar pneumothorax measuring 8 mm currently, previously 15 mm. Small right pleural effusion with adjacent compressive atelectasis.   Streaky opacities at the left lung base, favoring atelectasis. Stable mildly enlarged cardiomediastinal silhouette. Mild central pulmonary vascular congestion. Unchanged osseous structures.

## 2024-07-17 NOTE — PLAN OF CARE
Problem: Comorbidity Management  Goal: Maintenance of Asthma Control  Outcome: Progressing     Problem: Comorbidity Management  Goal: Maintenance of COPD Symptom Control  Outcome: Progressing   Pt oriented x4. Back to her baseline of 3L O2. VSS. Taking medications as prescribed. 1800 mL fluid restriction. Voiding spontaneously.

## 2024-07-17 NOTE — PROGRESS NOTES
PULMONARY / CRITICAL CARE PROGRESS NOTE    Date / Time of Admission:  7/12/2024  7:58 PM    Assessment:   Principal Problem:    Acute on chronic respiratory failure with hypoxemia (H)  Active Problems:    Nicotine Dependence    Fibromyalgia    Atrial fibrillation, unspecified type (H)    Acute on chronic respiratory failure with hypoxia (H)    Aortic stenosis, severe    Pneumonia due to 2019 novel coronavirus      Code Status:  Full Code    74yoF with emphysema on home O2, afib on Eliquis here with hypoxia in setting of RUL collapse and chronic moderate right pleural effusion.     Plan:   Pulmonary:  1) Chronic moderate right-sided pleural effusion  2) Underlying emphysema  3) Acute on chronic hypoxic respiratory failure  4) RUL collapse, underwent a thoracentesis and developed a post procedure pneumothorax, oxygen requirements still higher than her baseline but acceptable. PTX improved today.  -Bronchial hygiene with [place].   -Duoneb and mucomyst  -Down to home dose of oxygen.  -Smoking cessation.   -Decadron for 10 days  -Remdesivir for 5 days, last day 7/16.  - Will arrange for pulmonary clinic follow-up.    Discharging today.     Charlotte Paredes MD  Pulmonary and Critical Care  (p) [phone]      Subjective:   HPI:  Mary Kay Tejada is a 74 year old female with history of emphysema on home O2, DMII afib on anticoagulation who presents with hypoxia found to be COVID +. Reports fever and chills at home.     She was initiated on remdesivir and decadron in ED but overnight worsening hypoxia requiring high-flow. This was weaned off and RUL improved but then worsened with bronchial hygiene.     Back up to 5lpm    Principal Problem:    Acute on chronic respiratory failure with hypoxemia (H)  Active Problems:    Nicotine Dependence    Fibromyalgia    Atrial fibrillation, unspecified type (H)    Acute on chronic respiratory failure with hypoxia (H)    Aortic stenosis, severe    Pneumonia due to 2019 novel  coronavirus      Allergies: Celebrex [celecoxib] and Latex     MEDS:  Scheduled Meds:  Current Facility-Administered Medications   Medication Dose Route Frequency Provider Last Rate Last Admin    acetylcysteine (MUCOMYST) 20 % nebulizer solution 2 mL  2 mL Nebulization 4x Daily Carmelita Buckley MD   2 mL at 07/17/24 0725    apixaban ANTICOAGULANT (ELIQUIS) tablet 5 mg  5 mg Oral BID Bridgett Vazquez PA-C   5 mg at 07/17/24 0903    atorvastatin (LIPITOR) tablet 20 mg  20 mg Oral At Bedtime Bridgett Vazquez PA-C   20 mg at 07/16/24 2035    [Held by provider] carbamide peroxide (DEBROX) 6.5 % otic solution 5 drop  5 drop Both Ears BID Bridgett Vazquez PA-C        cefdinir (OMNICEF) capsule 300 mg  300 mg Oral BID Johnny Arellano MD        diltiazem ER COATED BEADS (CARDIZEM CD/CARTIA XT) 24 hr capsule 120 mg  120 mg Oral Daily Lisette Boucher MD   120 mg at 07/17/24 0903    furosemide (LASIX) tablet 40 mg  40 mg Oral Daily Bridgett Vazquez PA-C   40 mg at 07/17/24 0903    gabapentin (NEURONTIN) tablet 600 mg  600 mg Oral TID Bridgett Vazquez PA-C   600 mg at 07/17/24 0902    guaiFENesin (MUCINEX) 12 hr tablet 600 mg  600 mg Oral BID Bridgett Vazquez PA-C   600 mg at 07/17/24 0903    insulin aspart (NovoLOG) injection (RAPID ACTING)  1-3 Units Subcutaneous 4x Daily AC & HS Johnny Arellano MD   2 Units at 07/16/24 2144    ipratropium - albuterol 0.5 mg/2.5 mg/3 mL (DUONEB) neb solution 3 mL  3 mL Nebulization 4x daily Carmelita Buckley MD   3 mL at 07/17/24 0725    metoprolol tartrate (LOPRESSOR) tablet 25 mg  25 mg Oral BID Bridgett Vazquez PA-C   25 mg at 07/17/24 0903    pantoprazole (PROTONIX) EC tablet 40 mg  40 mg Oral BID Bridgett Vazquez PA-C   40 mg at 07/17/24 0903    polyethylene glycol (MIRALAX) Packet 17 g  17 g Oral BID Johnny Arellano MD   17 g at 07/15/24 2053    sennosides (SENOKOT) tablet 8.6 mg  8.6 mg Oral BID Johnny Arellano MD   8.6 mg at 07/16/24 1272     sodium chloride (PF) 0.9% PF flush 3 mL  3 mL Intracatheter Q8H Bridgett Vazquez PA-C   3 mL at 07/17/24 0903    sucralfate (CARAFATE) tablet 1 g  1 g Oral 4x Daily AC & HS Bridgett Vazquez PA-C   1 g at 07/17/24 0903     Continuous Infusions:  Current Facility-Administered Medications   Medication Dose Route Frequency Provider Last Rate Last Admin    Patient is already receiving anticoagulation with heparin, enoxaparin (LOVENOX), warfarin (COUMADIN)  or other anticoagulant medication   Does not apply Continuous PRN Bridgett Vazquez PA-C         PRN Meds:.  Current Facility-Administered Medications   Medication Dose Route Frequency Provider Last Rate Last Admin    acetaminophen (TYLENOL) tablet 650 mg  650 mg Oral Q4H PRN Bridgett Vazquez PA-C        Or    acetaminophen (TYLENOL) Suppository 650 mg  650 mg Rectal Q4H PRN Bridgett Vazquez PA-C        albuterol (PROVENTIL HFA/VENTOLIN HFA) inhaler  2 puff Inhalation Q4H PRN Bridgett Vazquez PA-C   2 puff at 07/15/24 0652    calcium carbonate (TUMS) chewable tablet 1,000 mg  1,000 mg Oral 4x Daily PRN Bridgett Vazquez PA-C        glucose gel 15-30 g  15-30 g Oral Q15 Min PRN Bridgett Vazquez PA-C        Or    dextrose 50 % injection 25-50 mL  25-50 mL Intravenous Q15 Min PRN Bridgett Vazquez PA-C        Or    glucagon injection 1 mg  1 mg Subcutaneous Q15 Min PRN Bridgett Vazquez PA-C        ipratropium - albuterol 0.5 mg/2.5 mg/3 mL (DUONEB) neb solution 3 mL  3 mL Nebulization Q4H PRN Lisette Boucher MD        lidocaine (LMX4) cream   Topical Q1H PRN Bridgett Vazquez PA-C        lidocaine 1 % 0.1-1 mL  0.1-1 mL Other Q1H PRN Bridgett Vazquez PA-C        naloxone (NARCAN) nasal spray 4 mg  4 mg Alternating Nostrils Once PRN Bridgett Vazquez PA-C        ondansetron (ZOFRAN ODT) ODT tab 4 mg  4 mg Oral Q6H PRN Bridgett Vazquez PA-C        Or    ondansetron (ZOFRAN) injection 4 mg  4 mg Intravenous  "Q6H PRN Bridgett Vazquez PA-C        oxyCODONE (ROXICODONE) tablet 10 mg  10 mg Oral Q6H PRN Bridgett Vazquez PA-C        Patient is already receiving anticoagulation with heparin, enoxaparin (LOVENOX), warfarin (COUMADIN)  or other anticoagulant medication   Does not apply Continuous PRN Bridgett Vazquez PA-C        senna-docusate (SENOKOT-S/PERICOLACE) 8.6-50 MG per tablet 1 tablet  1 tablet Oral BID PRN Bridgett Vazquez PA-C   1 tablet at 07/15/24 1715    Or    senna-docusate (SENOKOT-S/PERICOLACE) 8.6-50 MG per tablet 2 tablet  2 tablet Oral BID PRN Bridgett Vazquez PA-C        sodium chloride (PF) 0.9% PF flush 3 mL  3 mL Intracatheter q1 min prn Bridgett Vazquez PA-C             Objective:   VITALS:  BP 95/59 (BP Location: Left arm)   Pulse 63   Temp 97.9  F (36.6  C) (Oral)   Resp 18   Ht 1.651 m (5' 5\")   Wt 65.3 kg (143 lb 14.4 oz)   LMP  (LMP Unknown)   SpO2 90%   BMI 23.95 kg/m    EXAM:  General appearance: alert, appears stated age, and cooperative  Neck: no adenopathy and supple, symmetrical, trachea midline  Lungs: clear to auscultation bilaterally but minimal air movement.   Heart: irregularly irregular  Abdomen: soft, non-tender; bowel sounds normal; no masses,  no organomegaly  Extremities: no edema  Skin: Skin color, texture, turgor normal. No rashes or lesions  Neurologic: Grossly normal    I&O:     Intake/Output Summary (Last 24 hours) at 7/15/2024 1119  Last data filed at 7/15/2024 0633  Gross per 24 hour   Intake 1095 ml   Output 2425 ml   Net -1330 ml         Data Review:  Chest Xray  Personally reviewed image/s and Personally reviewed impression/s  EXAM: XR CHEST PORT 1 VIEW  LOCATION: [facility]  DATE: 7/14/2024     INDICATION: Evaluate partial collapse  COMPARISON: 7/13/2024     IMPRESSION: Increased atelectasis of the RIGHT lung with rightward shift of the mediastinum. Small right pleural effusion. Clear left lung. Left " subclavian approach pacing device. Cardiac silhouette within normal limits. Prosthetic aortic valve.    LABS      Latest Ref Rng & Units 7/12/2024     8:30 PM 7/13/2024     6:29 AM 7/14/2024     8:29 AM   Glucose Values   Glucose 70 - 99 mg/dL 188  161  100        Results for orders placed or performed during the hospital encounter of 07/12/24   Basic metabolic panel   Result Value Ref Range    Sodium 141 135 - 145 mmol/L    Potassium 4.8 3.4 - 5.3 mmol/L    Chloride 103 98 - 107 mmol/L    Carbon Dioxide (CO2) 32 (H) 22 - 29 mmol/L    Anion Gap 6 (L) 7 - 15 mmol/L    Urea Nitrogen 29.0 (H) 8.0 - 23.0 mg/dL    Creatinine 0.79 0.51 - 0.95 mg/dL    GFR Estimate 78 >60 mL/min/1.73m2    Calcium 8.8 8.8 - 10.2 mg/dL    Glucose 100 (H) 70 - 99 mg/dL     Lab Results   Component Value Date    WBC 5.8 07/15/2024    HGB 10.5 (L) 07/15/2024    HCT 35.6 07/15/2024    MCV 84 07/15/2024     (L) 07/15/2024       ABG:  Recent Labs   Lab 07/13/24  0316 07/12/24 2030   O2PER 0 36       Charlotte Paredes MD  Pulmonary and Critical Care  (P) [phone]

## 2024-07-17 NOTE — PROGRESS NOTES
General Surgery Progress Note:    Hospital Day # 5    ASSESSMENT:     1. Constipation, unspecified constipation type    2. Pneumonia due to 2019 novel coronavirus    3. Acute on chronic respiratory failure with hypoxemia (H)    4. Chronic obstructive pulmonary disease, unspecified COPD type (H)    5. Community acquired pneumonia, unspecified laterality        Mary Kay Tejada is a 74 year old female PMH COPD/emphysema oxygen dependent 3L at baseline usually overnight intermittent in day up to 4L, DM type II 6.2% on 2/6/24 presenting s/p thoracentesis 7/15 with postprocedural pneumothorax. CXR with stable right pneumothorax at the base of right lung with mediastinal shift on 7/15 and 7/16, decreased in size on 7/17.  Patient remains vitally stable now on 3 L at baseline.    PLAN:   -Okay for diet as tolerated  -Activity as tolerated  -Continue incentive spirometry at home  -Patient has smoking cessation plans in place  -Appreciate pulmonology recommendations, okay to discharge per surgical standpoint  -No further plans involving chest tube insertion  -Medical management per primary team    SUBJECTIVE:   Mary Kay Tejada was seen on rounds.  Patient states she is doing well this morning and denies any further shortness of breath or chest pains.  Denies any change since yesterday, states she still little bit sore from the thoracentesis site.  She is eager to go home and is now on her baseline oxygen.  Patient denies fever, chills, nausea, vomiting.    Patient states she has smoking cessation plans in place and will continue weaning off of cigarettes.  She states her sister did this and smoked more than her at baseline so she is encouraged by her ability to quit.  She has tried lozenges which make her incredibly nauseous, she has also tried patches but she is not able to because of her skin.    Patient also has plans to ambulate more when she is home and will continue using the incentive spirometer    Patient Vitals  for the past 24 hrs:   BP Temp Temp src Pulse Resp SpO2   07/17/24 1116 -- -- -- -- 16 --   07/17/24 0800 95/59 97.9  F (36.6  C) Oral 63 18 90 %   07/17/24 0406 108/58 -- Oral 69 16 91 %   07/17/24 0003 (!) 148/75 97.4  F (36.3  C) Oral -- 18 --   07/16/24 1921 -- -- -- -- -- 95 %   07/16/24 1626 101/59 98.3  F (36.8  C) Oral 70 18 (!) 87 %   07/16/24 1605 -- -- -- -- -- 95 %   07/16/24 1240 102/52 98.3  F (36.8  C) Oral 71 18 96 %       Physical Exam:  General: patient seen sitting up in chair in no acute distress  Resp: no increased work of breathing, breathing comfortably on 3 L of oxygen, auscultation of air throughout entire right lung fields  Abdomen: Generally soft and nontender  Extremities: No edema, cyanosis, or obvious deformities visualized on exam    No results displayed because visit has over 200 results.           APRIL Salmeron [facility] General Surgery  [facility]  [address]  [place] [address]  [facility] [phone]

## 2024-07-17 NOTE — PROGRESS NOTES
TRANSITIONS OF CARE (LETICIA) LOG    LETICIA tasks should be completed by the CC within one (1) business day of notification of each transition. Follow up contact with member is required after return to their usual care setting.  Note:  If CC finds out about the transitions fifteen (15) days or more after the member has returned to their usual care setting, no LETICIA log is needed. However, the CC should check in with the member to discuss the transition process, any changes needed to the care plan and document it in a case note.     Member Name:  [facility] Kay [facility] [id number]   Product: [facility] [place] Coordinator Contact:  Samantha Alexandra RN, PHN Agency/County/Care System: [facility]   Transition Communication Actions from Care Management Contact   Transition #1   Notification Date: 7/17/24 Transition Date:   7/12/24 Transition From: Home     Is this the member s usual care setting?               yes Transition To: [facility]   Transition Type:  Unplanned    Documentation from conversation with the member/responsible party, provider, discharging and receiving facility:   Date: 7/17/24 : Per chart review, member discharged home today. Will call member tomorrow.   Transition #2   Notification Date: 7/17/24 Transition Date:   7/17/24 Transition From: [facility]     Is this the member s usual care setting?               no Transition To: Home   Transition Type:  Planned    Documentation from conversation with the member/responsible party, provider, discharging and receiving facility:   Date: 7/18/24: Attempted to reach member, no answer and left voice mail.      Received notification of transition to home.  CC contacted member and reviewed discharge summary.  Member has a follow-up appointment with PCP in 7 days: Yes: scheduled on 7/29/24    Member has had a change in condition: No  Home visit needed: Yes. Due for  "annual assessment. Member reports \"hit and miss\" for fevers and cough. CC will call her again on 7/30/24 and will schedule HV is she is feeling better.  Care plan reviewed and updated.  The following home based services [facility] were resumed.  New referrals placed: No  Transition log completed.   PCP, Cammy Bui, notified of transition back to home via EMR.                                           *RETURN TO USUAL CARE SETTING: *Complete tasks below when the member is discharging TO their usual care setting within one (1) business day of notification..      For situations where the Care Coordinator is notified of the discharge prior to the date of discharge, the Care Coordinator must follow up with the member or designated representative to confirm that discharge actually occurred and discuss required LETICIA tasks as outlined in the LETICIA Instructions.  (This includes situations where it may be a  new  usual care setting for the member. (i.e., a community member who decides upon permanent nursing home placement following hospitalization and rehab).    Discuss with Member/Responsible Party:    Check  Yes  - if the member, family member and/or SNF/facility staff manages the following:    If  No  provide explanation in the comments section.          Date completed: 7/25/24 Communicated with member or their designated representative about the following:  care transition process; about changes to the member s health status; plan of care updates; education about transitions and how to prevent unplanned transitions/readmissions    Four Pillars for Optimal Transition:    Check  Yes  - if the member, family member and/or SNF/facility staff manages the following:    If  No  provide explanation in the comments section.          [x]  Yes     []  No Does the member have a follow-up appointment scheduled with primary care or specialist? (Mental health hospitalizations--the appt. should be w/in 7 days)         "      For mental health hospitalizations:  []  Yes     []  No     Does the member have a follow-up appointment scheduled with a mental health practitioner within 7 days of discharge?  [x]  Yes     []  No     Has a medication review been completed with member? If no, refer to PCP, home care nurse, MTM, pharmacist  [x]  Yes     []  No     Can the member manage their medications or is there a system in place to manage medications (e.g. home care set-up)?         [x]  Yes     []  No     Can the member verbalize warning signs and symptoms to watch for and how to respond?  [x]  Yes     []  No     Does the member have a copy of and understand their discharge instructions?  If no, assist to obtain copy of discharge instructions, review discharge instructions, and assist to contact PCP to discuss questions about their recent hospitalization.  [x]  Yes     []  No     Does the member have adequate food, housing and transportation?  If no, add goal and discuss additional supports available to the member                                                                                                                                                                                 [x]  Yes     []  No     Is the member safe in their home?  If no, document needs and support provided                                                                                                                                                                          []  Yes     [x]  No     Are there any concerns of vulnerability, abuse, or neglect?  If yes, document concerns and actions taken by Care Coordinator as a mandated                                                                                                                                                                              [x]  Yes     []  No     Does the member use a Personal Health Care Record?  Check  Yes  if visit summary, discharge summary, and/or healthcare summary  are being used as a PHR.                                                                                                                                                                                  [x]  Yes     []  No     Have you reviewed the discharge summary with the member? If  No  provide explanation in comments.  [x]  Yes     []  No     Have you updated the member s care plan/support plan? Add new diagnosis, medications, treatments, goals & interventions, as applicable. If No, provide explanation in comments.    Comments:           Notes from conversation with the member/responsible party, provider, discharging and receiving facility (as applicable):             Samantha Alexandra RN, PHN   [facility]  [phone]

## 2024-07-17 NOTE — PROGRESS NOTES
Pt on O2 3L NC, sating low to mid 90s, BS clear, diminished pre and post neb, Pt scheduled Duoneb and Mucomyst qid. Pt refused nebs at 1600, reports she will be discharged soon.     Luis Manuel Roa, RT

## 2024-07-18 DIAGNOSIS — Z09 HOSPITAL DISCHARGE FOLLOW-UP: ICD-10-CM

## 2024-07-20 LAB
BACTERIA PLR CULT: NO GROWTH
GRAM STAIN RESULT: NORMAL
GRAM STAIN RESULT: NORMAL

## 2024-07-22 ENCOUNTER — ANCILLARY PROCEDURE (OUTPATIENT)
Dept: CARDIOLOGY | Facility: CLINIC | Age: 74
End: 2024-07-22
Attending: INTERNAL MEDICINE
Payer: COMMERCIAL

## 2024-07-22 ENCOUNTER — TELEPHONE (OUTPATIENT)
Dept: CARDIOLOGY | Facility: CLINIC | Age: 74
End: 2024-07-22

## 2024-07-22 ENCOUNTER — TELEPHONE (OUTPATIENT)
Dept: FAMILY MEDICINE | Facility: CLINIC | Age: 74
End: 2024-07-22

## 2024-07-22 DIAGNOSIS — I49.5 SINUS NODE DYSFUNCTION (H): ICD-10-CM

## 2024-07-22 DIAGNOSIS — I49.5 SICK SINUS SYNDROME (H): ICD-10-CM

## 2024-07-22 DIAGNOSIS — Z95.0 CARDIAC PACEMAKER IN SITU: ICD-10-CM

## 2024-07-22 DIAGNOSIS — I48.0 PAROXYSMAL ATRIAL FIBRILLATION (H): ICD-10-CM

## 2024-07-22 NOTE — TELEPHONE ENCOUNTER
7/22/24: Spoke with son, Heriberto and reviewed pacemaker remote transmission. Heriberto reports that mark called him today stating she didn't feel well. Will route message to Dr. Pizano to review symptomatic AF w/RVR. Zofia Hodges RN

## 2024-07-22 NOTE — TELEPHONE ENCOUNTER
MTM referral from: Transitions of Care (recent hospital discharge or ED visit)    MTM referral outreach attempt #2 on July 22, 2024 at 2:04 PM      Outcome: Patient is not interested at this time because no questions     Use [facility] (jacqui)  for the carrier/Plan on the flowsheet          Cyndi Elias [place]    [phone]

## 2024-07-22 NOTE — TELEPHONE ENCOUNTER
Encounter Type: alert remote pacemaker transmission for AT/AF >/=6hrs for 2 days.   Device: Medtronic Lucy.   Pacing % /Programmed: AP 43%,  <1% at AAIR<=>DDDR 60/130 ppm.   Lead(s): stable.   Battery longevity: 14yrs, 3mo estimated.   Presenting: Atrial flutter with ventricular sensing  bpm.   Atrial high rates: since 4/29/24; 24 mode switch episodes, AT/AF appears to be in progress since 7/21/24 2:45PM, burden <1%, v-rates >/=120bpm ~95%. 19 fast A&V episodes, available EGMs appear to be RVR during AF.   Anticoagulant: Eliquis.   Ventricular High rates: since 4/29/24; None detected.   Comments: Normal device function.   Plan: Routed to device RN for review. MICHELLE Padgett, Device Specialist  ADD: Transmission reviewed, see EPIC for details. Zofia Hodges RN     7/22/24: Left voicemail message to review transmission and check for symptoms. Scheduled to see Dr. Pizano 9/11/24. Will route to Dr. Pizano to review. Zofia Hodges RN

## 2024-07-23 ENCOUNTER — TELEPHONE (OUTPATIENT)
Dept: CARDIOLOGY | Facility: CLINIC | Age: 74
End: 2024-07-23
Payer: COMMERCIAL

## 2024-07-23 DIAGNOSIS — I48.0 PAROXYSMAL ATRIAL FIBRILLATION (H): Primary | ICD-10-CM

## 2024-07-23 DIAGNOSIS — I44.2 COMPLETE ATRIOVENTRICULAR BLOCK (H): ICD-10-CM

## 2024-07-23 RX ORDER — METOPROLOL TARTRATE 50 MG
50 TABLET ORAL 2 TIMES DAILY
Qty: 180 TABLET | Refills: 1 | Status: ON HOLD | OUTPATIENT
Start: 2024-07-23 | End: 2024-09-08

## 2024-07-23 NOTE — TELEPHONE ENCOUNTER
See device alert 7/22/24:         Surendra Pizano MD You17 hours ago (4:13 PM)       Increase metoprolol to 50 mg twice a day and follow-up with EP nurse practitioner next available      Zofia Hodges RN Zukowski, Wojciech, MD18 hours ago (4:00 PM)     WM  Any recommendations?             ==Called Mary Kay and updated on recommendations from Dr. Pizano. Understanding verbalized and she will increase her metoprolol to 50 mg BID and is agreeable to meeting with Afib CECILIA. She reports that she does feel better today- rx list updated and new Rx dose sent to preferred pharmacy. -[facility]

## 2024-07-23 NOTE — NURSING NOTE
Surendra Pizano MD You17 hours ago (4:13 PM)       Increase metoprolol to 50 mg twice a day and follow-up with EP nurse practitioner next available      Zofia Hodges RN Zukowski, Wojciech, MD18 hours ago (4:00 PM)     WM  Any recommendations?         ==see telephone encounter dated 7/23/24. -[facility]

## 2024-07-26 DIAGNOSIS — J96.22 ACUTE AND CHRONIC RESPIRATORY FAILURE WITH HYPERCAPNIA (H): ICD-10-CM

## 2024-07-26 RX ORDER — POTASSIUM CHLORIDE 1500 MG/1
TABLET, EXTENDED RELEASE ORAL
Qty: 90 TABLET | Refills: 2 | Status: ON HOLD | OUTPATIENT
Start: 2024-07-26 | End: 2024-09-08

## 2024-07-29 ENCOUNTER — OFFICE VISIT (OUTPATIENT)
Dept: FAMILY MEDICINE | Facility: CLINIC | Age: 74
End: 2024-07-29
Payer: COMMERCIAL

## 2024-07-29 VITALS
DIASTOLIC BLOOD PRESSURE: 58 MMHG | SYSTOLIC BLOOD PRESSURE: 96 MMHG | OXYGEN SATURATION: 93 % | HEART RATE: 106 BPM | RESPIRATION RATE: 18 BRPM | TEMPERATURE: 97.6 F

## 2024-07-29 DIAGNOSIS — J93.9 PNEUMOTHORAX ON RIGHT: ICD-10-CM

## 2024-07-29 DIAGNOSIS — M54.6 TRIGGER POINT OF THORACIC REGION: ICD-10-CM

## 2024-07-29 DIAGNOSIS — J43.9 PULMONARY EMPHYSEMA, UNSPECIFIED EMPHYSEMA TYPE (H): ICD-10-CM

## 2024-07-29 DIAGNOSIS — G89.4 CHRONIC PAIN SYNDROME: ICD-10-CM

## 2024-07-29 DIAGNOSIS — M79.7 FIBROMYALGIA: Primary | ICD-10-CM

## 2024-07-29 DIAGNOSIS — Z29.11 NEED FOR VACCINATION AGAINST RESPIRATORY SYNCYTIAL VIRUS: ICD-10-CM

## 2024-07-29 DIAGNOSIS — E11.42 DIABETIC POLYNEUROPATHY ASSOCIATED WITH TYPE 2 DIABETES MELLITUS (H): ICD-10-CM

## 2024-07-29 PROCEDURE — 99214 OFFICE O/P EST MOD 30 MIN: CPT | Mod: 25 | Performed by: FAMILY MEDICINE

## 2024-07-29 PROCEDURE — 20553 NJX 1/MLT TRIGGER POINTS 3/>: CPT | Performed by: FAMILY MEDICINE

## 2024-07-29 RX ORDER — LIDOCAINE HYDROCHLORIDE 10 MG/ML
10 INJECTION, SOLUTION INFILTRATION; PERINEURAL ONCE
Status: COMPLETED | OUTPATIENT
Start: 2024-07-29 | End: 2024-07-29

## 2024-07-29 RX ORDER — OXYCODONE HYDROCHLORIDE 10 MG/1
10 TABLET ORAL EVERY 6 HOURS PRN
Qty: 120 TABLET | Refills: 0 | Status: ON HOLD | OUTPATIENT
Start: 2024-07-29 | End: 2024-09-08

## 2024-07-29 RX ADMIN — LIDOCAINE HYDROCHLORIDE 10 ML: 10 INJECTION, SOLUTION INFILTRATION; PERINEURAL at 07:22

## 2024-07-29 ASSESSMENT — PATIENT HEALTH QUESTIONNAIRE - PHQ9: SUM OF ALL RESPONSES TO PHQ QUESTIONS 1-9: 0

## 2024-07-29 NOTE — PROGRESS NOTES
1. Fibromyalgia  2. Trigger point of thoracic region  3. Chronic pain syndrome  Patient with fibromyalgia/trigger points/chronic pain syndrome.  Uses trigger point injections for pain control -this has allowed us not to increase her oxycodone use.  Trigger points injected today (see procedure note below).  Refilled oxycodone.    - lidocaine 1 % injection 10 mL  - MSK: Inject Trigger Points, >3  - oxyCODONE IR (ROXICODONE) 10 MG tablet; Take 1 tablet (10 mg) by mouth every 6 hours as needed for moderate to severe pain  Dispense: 120 tablet; Refill: 0    4. Pulmonary emphysema, unspecified emphysema type (H)  Patient has COPD - still smoking - trying to wean down on cigarettes (down to a couple cigarettes/day)    5. Diabetic polyneuropathy associated with type 2 diabetes mellitus (H)  H/o type II DM - has been well controlled   Hemoglobin A1C   Date Value Ref Range Status   02/06/2024 6.2 (H) <5.7 % Final     Comment:     Normal <5.7%   Prediabetes 5.7-6.4%    Diabetes 6.5% or higher     Note: Adopted from ADA consensus guidelines.         6. Pneumothorax on right  Patient had recent right upper lobe pneumothorax with significant shortness of breath - was treated in hospital - reviewed discharge summary  I have reviewed available hospital records, consults, notes, discharge summary as well as imaging and lab results.  In addition I have reviewed her medication list and made any adjustments as indicated in orders.    Needs repeat CT scan (per summary) - will order (patient is aware)   - CT Chest w Contrast; Future    7. Need for vaccination against respiratory syncytial virus  Due for RSV vaccine - recommended to patient again - at high risk with underlying respiratory conditions       [facility]    Procedure: MSK: Inject Trigger Points, >3    Date/Time: 7/29/2024 5:50 PM    Performed by: Cammy Bui MD  Authorized by: Cammy Bui MD      UNIVERSAL  "PROTOCOL   Site Marked: Yes  Prior Images Obtained and Reviewed:  NA  Required items: Required blood products, implants, devices and special equipment available (NA)    Patient identity confirmed:  Verbally with patient  NA - No sedation, light sedation, or local anesthesia  Confirmation Checklist:  Patient's identity using two indicators and relevant allergies  Time out: Immediately prior to the procedure a time out was called    Universal Protocol: the Joint Commission Universal Protocol was followed    Preparation: Patient was prepped and draped in usual sterile fashion    ESBL (mL):  0    SEDATION    Patient Sedated: No      PROCEDURE  Describe Procedure: 6 trigger points identified at posterior bilateral neck and upper thoracic - each injected with 1.6 ml Lidocaine 1%; after cleaning skin with Betadine x 3    Patient Tolerance:  Patient tolerated the procedure well with no immediate complications  Length of time physician/provider present for 1:1 monitoring during sedation: 0        Carol Muñiz is a 74 year old, presenting for the following health issues:  trigger point shot        7/29/2024     5:04 PM   Additional Questions   Roomed by Shefali VEGA   Accompanied by self     Last filled oxycodone 6/25/2024 per     Reviewed hospital records - had pneumothorax and large pleural effusion   Needs repeat CT scan in 1 month for follow up per discharge summary   Still smoking - down to 2 cigarettes/day \"I'm going to quit\"      History of Present Illness       Reason for visit:  Trigger point shot    She eats 4 or more servings of fruits and vegetables daily.She consumes 0 sweetened beverage(s) daily.She exercises with enough effort to increase her heart rate 9 or less minutes per day.  She exercises with enough effort to increase her heart rate 3 or less days per week.   She is taking medications regularly.       Review of Systems   Constitutional:  Positive for fatigue and unexpected weight change. "   Respiratory:  Positive for cough and shortness of breath.    Cardiovascular:  Positive for chest pain (improved).   Musculoskeletal:  Positive for back pain, myalgias and neck pain.   All other systems reviewed and are negative.          Objective    BP 96/58 (BP Location: Left arm, Patient Position: Sitting, Cuff Size: Adult Regular)   Pulse 106   Temp 97.6  F (36.4  C) (Temporal)   Resp 18   LMP  (LMP Unknown)   SpO2 93%   There is no height or weight on file to calculate BMI.  Physical Exam  Vitals reviewed.   Constitutional:       General: She is not in acute distress.     Appearance: Normal appearance. She is ill-appearing (chronically ill appearing).   HENT:      Head: Normocephalic.      Right Ear: Tympanic membrane, ear canal and external ear normal.      Left Ear: Tympanic membrane, ear canal and external ear normal.      Nose: Nose normal.      Mouth/Throat:      Mouth: Mucous membranes are moist.      Pharynx: No posterior oropharyngeal erythema.   Eyes:      Extraocular Movements: Extraocular movements intact.      Conjunctiva/sclera: Conjunctivae normal.      Pupils: Pupils are equal, round, and reactive to light.   Cardiovascular:      Rate and Rhythm: Normal rate and regular rhythm.      Pulses: Normal pulses.      Heart sounds: Normal heart sounds. No murmur heard.  Pulmonary:      Effort: Pulmonary effort is normal.      Breath sounds: Normal breath sounds.   Abdominal:      Palpations: Abdomen is soft. There is no mass.      Tenderness: There is no abdominal tenderness. There is no guarding or rebound.   Musculoskeletal:         General: Tenderness (trigger point tenderness - especially neck /upper thoracic) present. No deformity. Normal range of motion.      Cervical back: Normal range of motion and neck supple.   Lymphadenopathy:      Cervical: No cervical adenopathy.   Skin:     General: Skin is warm and dry.   Neurological:      General: No focal deficit present.      Mental Status: She  is alert.   Psychiatric:         Mood and Affect: Mood normal.         Behavior: Behavior normal.            No results found for any visits on 07/29/24.        Signed Electronically by: SAVANA SILVER MD

## 2024-07-31 DIAGNOSIS — J44.9 COPD (CHRONIC OBSTRUCTIVE PULMONARY DISEASE) (H): Primary | ICD-10-CM

## 2024-08-01 ENCOUNTER — PATIENT OUTREACH (OUTPATIENT)
Dept: GERIATRIC MEDICINE | Facility: CLINIC | Age: 74
End: 2024-08-01

## 2024-08-01 NOTE — PROGRESS NOTES
"[facility] Care Coordination Contact      [facility] Refusal Telephone Assessment    Member refused home visit HRA on 8/1/2024 (reason: Diagnosed with COVID-19 7/12/24 and was hospitalized. Member recovering and prefers HV done when she has more energy. HV scheduled for 8/7/24 ).    ER visits: No  Hospitalizations: Yes -  [facility]  Health concerns: cough \"to get the junk out\" otherwise feeling better.  Falls/Injuries: No  ADL/IADL Dependencies: yes        Member currently receiving the following home care services:   PCA and homemaking services  Member currently receiving the following community resources:  NA  Informal support(s):  NA      [facility] Plan sponsored benefits: Shared information re: Silver Sneakers/gym memberships, ASA, Calcium +D.    Follow-Up Plan: HV scheduled for 8/7/24 at 9:30 AM.      Contact information shared with member and family, encouraged member to call with any questions or concerns at any time.    This CC note routed to PCP, Cammy Bui RN, PHN   [facility]  [phone]        "

## 2024-08-04 ASSESSMENT — ENCOUNTER SYMPTOMS
COUGH: 1
SHORTNESS OF BREATH: 1
UNEXPECTED WEIGHT CHANGE: 1
FATIGUE: 1
BACK PAIN: 1
NECK PAIN: 1
MYALGIAS: 1

## 2024-08-07 ENCOUNTER — PATIENT OUTREACH (OUTPATIENT)
Dept: GERIATRIC MEDICINE | Facility: CLINIC | Age: 74
End: 2024-08-07
Payer: COMMERCIAL

## 2024-08-08 ASSESSMENT — ACTIVITIES OF DAILY LIVING (ADL): DEPENDENT_IADLS:: CLEANING;COOKING;LAUNDRY;SHOPPING;MEAL PREPARATION;MEDICATION MANAGEMENT;TRANSPORTATION

## 2024-08-08 NOTE — PROGRESS NOTES
[facility] Care Coordination Contact    [facility] Home Visit Assessment     Home visit for Health Risk Assessment with Mary Kay Tejada completed on August 7, 2024    Type of residence:: Private home - stairs  Current living arrangement:: I live in a private home with family     Assessment completed with:: Patient    Current Care Plan  Member currently receiving the following home care services:     Member currently receiving the following community resources: County Worker, DME, Lifeline, PCA, Housekeeping/Chore Agency      Medication Review  Medication reconciliation completed in Epic: Yes  Medication set-up & administration: Family/informal caregiver sets up weekly.  Self-administers medications.  Medication Risk Assessment Medication (1 or more, place referral to MTM): N/A: No risk factors identified  MTM Referral Placed: No: Member is already followed by MTM. Will follow up with current MTM.    Mental/Behavioral Health   Depression Screening:   PHQ-2 Total Score (Adult) - Positive if 3 or more points; Administer PHQ-9 if positive: 1       Mental health DX:: No        Falls Assessment:   Fallen 2 or more times in the past year?: No   Any fall with injury in the past year?: No    ADL/IADL Dependencies:   Dependent ADLs:: Ambulation-walker, Ambulation-cane, Bathing, Grooming, Transfers  Dependent IADLs:: Cleaning, Cooking, Laundry, Shopping, Meal Preparation, Medication Management, Transportation    Health Plan sponsored benefits: [facility]: Shared information regarding [employer]. Reviewed preventative health screening and health plan supplemental benefits/incentives. Reviewed medication disposal form.    PCA Assessment completed at visit: Yes Annual PCA assessment indicated 5 hours per day of PCA. This is an increase from the previous assessment.     Elderly Waiver Eligibility: Yes-will continue on EW    Care Plan & Recommendations: Member will continue to reside at home with PCA  and homemaking services. Will continue life alert, wipes and pull ups.    Due for mammogram and vision exams. Member will schedule appointment and family will provide transportation to clinic.    See [facility] for detailed assessment information.    Follow-Up Plan: Member informed of future contact, plan to f/u with member with a 6 month telephone assessment.  Contact information shared with member and encouraged member to call with any questions or concerns at any time.    [facility] care continuum providers: Please see Snapshot and Care Management Flowsheets for Specific details of care plan.    This CC note routed to PCP, Cammy Bui RN, PHN   [facility]  [phone]

## 2024-08-10 ENCOUNTER — APPOINTMENT (OUTPATIENT)
Dept: RADIOLOGY | Facility: CLINIC | Age: 74
DRG: 871 | End: 2024-08-10
Attending: STUDENT IN AN ORGANIZED HEALTH CARE EDUCATION/TRAINING PROGRAM
Payer: COMMERCIAL

## 2024-08-10 ENCOUNTER — HOSPITAL ENCOUNTER (INPATIENT)
Facility: CLINIC | Age: 74
LOS: 7 days | Discharge: ANOTHER HOSPITAL | DRG: 871 | End: 2024-08-17
Attending: STUDENT IN AN ORGANIZED HEALTH CARE EDUCATION/TRAINING PROGRAM | Admitting: STUDENT IN AN ORGANIZED HEALTH CARE EDUCATION/TRAINING PROGRAM
Payer: COMMERCIAL

## 2024-08-10 ENCOUNTER — APPOINTMENT (OUTPATIENT)
Dept: CT IMAGING | Facility: CLINIC | Age: 74
DRG: 871 | End: 2024-08-10
Attending: STUDENT IN AN ORGANIZED HEALTH CARE EDUCATION/TRAINING PROGRAM
Payer: COMMERCIAL

## 2024-08-10 ENCOUNTER — ANCILLARY PROCEDURE (OUTPATIENT)
Dept: ULTRASOUND IMAGING | Facility: CLINIC | Age: 74
DRG: 871 | End: 2024-08-10
Attending: STUDENT IN AN ORGANIZED HEALTH CARE EDUCATION/TRAINING PROGRAM
Payer: COMMERCIAL

## 2024-08-10 DIAGNOSIS — I48.91 ATRIAL FIBRILLATION WITH RAPID VENTRICULAR RESPONSE (H): ICD-10-CM

## 2024-08-10 DIAGNOSIS — J94.8: ICD-10-CM

## 2024-08-10 DIAGNOSIS — J96.22 ACUTE AND CHRONIC RESPIRATORY FAILURE WITH HYPERCAPNIA (H): ICD-10-CM

## 2024-08-10 DIAGNOSIS — J90 PLEURAL EFFUSION: Primary | ICD-10-CM

## 2024-08-10 DIAGNOSIS — E87.20 LACTIC ACIDOSIS: ICD-10-CM

## 2024-08-10 LAB
ALBUMIN SERPL BCG-MCNC: 3.1 G/DL (ref 3.5–5.2)
ALBUMIN UR-MCNC: NEGATIVE MG/DL
ALP SERPL-CCNC: 239 U/L (ref 40–150)
ALT SERPL W P-5'-P-CCNC: 99 U/L (ref 0–50)
ANION GAP SERPL CALCULATED.3IONS-SCNC: 10 MMOL/L (ref 7–15)
APPEARANCE FLD: CLEAR
APPEARANCE UR: CLEAR
AST SERPL W P-5'-P-CCNC: 186 U/L (ref 0–45)
ATRIAL RATE - MUSE: 133 BPM
BASE EXCESS BLDV CALC-SCNC: -1.1 MMOL/L (ref -3–3)
BASE EXCESS BLDV CALC-SCNC: 3.6 MMOL/L (ref -3–3)
BASOPHILS # BLD AUTO: 0.1 10E3/UL (ref 0–0.2)
BASOPHILS NFR BLD AUTO: 1 %
BILIRUB DIRECT SERPL-MCNC: 0.82 MG/DL (ref 0–0.3)
BILIRUB SERPL-MCNC: 1.2 MG/DL
BILIRUB UR QL STRIP: NEGATIVE
BUN SERPL-MCNC: 23.5 MG/DL (ref 8–23)
CALCIUM SERPL-MCNC: 9.4 MG/DL (ref 8.8–10.4)
CELL COUNT BODY FLUID SOURCE: NORMAL
CHLORIDE SERPL-SCNC: 100 MMOL/L (ref 98–107)
COLOR FLD: YELLOW
COLOR UR AUTO: ABNORMAL
CREAT SERPL-MCNC: 0.93 MG/DL (ref 0.51–0.95)
DIASTOLIC BLOOD PRESSURE - MUSE: 61 MMHG
EGFRCR SERPLBLD CKD-EPI 2021: 64 ML/MIN/1.73M2
EOSINOPHIL # BLD AUTO: 0.1 10E3/UL (ref 0–0.7)
EOSINOPHIL NFR BLD AUTO: 1 %
ERYTHROCYTE [DISTWIDTH] IN BLOOD BY AUTOMATED COUNT: 17.8 % (ref 10–15)
GLUCOSE BLDC GLUCOMTR-MCNC: 160 MG/DL (ref 70–99)
GLUCOSE BLDC GLUCOMTR-MCNC: 220 MG/DL (ref 70–99)
GLUCOSE BLDC GLUCOMTR-MCNC: 260 MG/DL (ref 70–99)
GLUCOSE BLDC GLUCOMTR-MCNC: 264 MG/DL (ref 70–99)
GLUCOSE BODY FLUID SOURCE: NORMAL
GLUCOSE FLD-MCNC: 159 MG/DL
GLUCOSE SERPL-MCNC: 222 MG/DL (ref 70–99)
GLUCOSE UR STRIP-MCNC: >1000 MG/DL
HCO3 BLDV-SCNC: 28 MMOL/L (ref 21–28)
HCO3 BLDV-SCNC: 31 MMOL/L (ref 21–28)
HCO3 SERPL-SCNC: 27 MMOL/L (ref 22–29)
HCT VFR BLD AUTO: 39.6 % (ref 35–47)
HGB BLD-MCNC: 11.4 G/DL (ref 11.7–15.7)
HGB UR QL STRIP: NEGATIVE
HOLD SPECIMEN: NORMAL
HOLD SPECIMEN: NORMAL
HYALINE CASTS: 7 /LPF
IMM GRANULOCYTES # BLD: 0.3 10E3/UL
IMM GRANULOCYTES NFR BLD: 2 %
INTERPRETATION ECG - MUSE: NORMAL
KETONES UR STRIP-MCNC: NEGATIVE MG/DL
LACTATE SERPL-SCNC: 1.7 MMOL/L (ref 0.7–2)
LACTATE SERPL-SCNC: 3.3 MMOL/L (ref 0.7–2)
LD BODY BODY FLUID SOURCE: NORMAL
LDH FLD L TO P-CCNC: 268 U/L
LDH SERPL L TO P-CCNC: 701 U/L (ref 0–250)
LEUKOCYTE ESTERASE UR QL STRIP: NEGATIVE
LYMPHOCYTES # BLD AUTO: 2 10E3/UL (ref 0.8–5.3)
LYMPHOCYTES NFR BLD AUTO: 15 %
LYMPHOCYTES NFR FLD MANUAL: 22 %
MAGNESIUM SERPL-MCNC: 2.7 MG/DL (ref 1.7–2.3)
MCH RBC QN AUTO: 24.9 PG (ref 26.5–33)
MCHC RBC AUTO-ENTMCNC: 28.8 G/DL (ref 31.5–36.5)
MCV RBC AUTO: 87 FL (ref 78–100)
MONOCYTES # BLD AUTO: 1.2 10E3/UL (ref 0–1.3)
MONOCYTES NFR BLD AUTO: 9 %
MONOS+MACROS NFR FLD MANUAL: 5 %
MUCOUS THREADS #/AREA URNS LPF: PRESENT /LPF
NEUTROPHILS # BLD AUTO: 9.5 10E3/UL (ref 1.6–8.3)
NEUTROPHILS NFR BLD AUTO: 73 %
NEUTS BAND NFR FLD MANUAL: 73 %
NITRATE UR QL: NEGATIVE
NRBC # BLD AUTO: 0.1 10E3/UL
NRBC BLD AUTO-RTO: 1 /100
NT-PROBNP SERPL-MCNC: 8987 PG/ML (ref 0–900)
O2/TOTAL GAS SETTING VFR VENT: 36 %
O2/TOTAL GAS SETTING VFR VENT: 49 %
OXYHGB MFR BLDV: 30 % (ref 70–75)
OXYHGB MFR BLDV: 31 % (ref 70–75)
P AXIS - MUSE: NORMAL DEGREES
PCO2 BLDV: 78 MM HG (ref 40–50)
PCO2 BLDV: 88 MM HG (ref 40–50)
PH BLDV: 7.12 [PH] (ref 7.32–7.43)
PH BLDV: 7.21 [PH] (ref 7.32–7.43)
PH UR STRIP: 5 [PH] (ref 5–7)
PLATELET # BLD AUTO: 255 10E3/UL (ref 150–450)
PO2 BLDV: 27 MM HG (ref 25–47)
PO2 BLDV: 30 MM HG (ref 25–47)
POTASSIUM SERPL-SCNC: 5.3 MMOL/L (ref 3.4–5.3)
PR INTERVAL - MUSE: NORMAL MS
PROCALCITONIN SERPL IA-MCNC: 0.17 NG/ML
PROT FLD-MCNC: 3.3 G/DL
PROT SERPL-MCNC: 7 G/DL (ref 6.4–8.3)
PROT SERPL-MCNC: 7 G/DL (ref 6.4–8.3)
PROTEIN BODY FLUID SOURCE: NORMAL
QRS DURATION - MUSE: 110 MS
QT - MUSE: 338 MS
QTC - MUSE: 543 MS
R AXIS - MUSE: -63 DEGREES
RBC # BLD AUTO: 4.57 10E6/UL (ref 3.8–5.2)
RBC URINE: 1 /HPF
SAO2 % BLDV: 30.3 % (ref 70–75)
SAO2 % BLDV: 31.8 % (ref 70–75)
SODIUM SERPL-SCNC: 137 MMOL/L (ref 135–145)
SP GR UR STRIP: 1.02 (ref 1–1.03)
SQUAMOUS EPITHELIAL: <1 /HPF
SYSTOLIC BLOOD PRESSURE - MUSE: 88 MMHG
T AXIS - MUSE: 102 DEGREES
TRIGL FLD-MCNC: 19 MG/DL
TRIGLYCERIDE BODY FLUID SOURCE: NORMAL
TROPONIN T SERPL HS-MCNC: 28 NG/L
TSH SERPL DL<=0.005 MIU/L-ACNC: 2.8 UIU/ML (ref 0.3–4.2)
UROBILINOGEN UR STRIP-MCNC: <2 MG/DL
VENTRICULAR RATE- MUSE: 155 BPM
WBC # BLD AUTO: 13.1 10E3/UL (ref 4–11)
WBC # FLD AUTO: 1451 /UL
WBC URINE: 1 /HPF

## 2024-08-10 PROCEDURE — 83880 ASSAY OF NATRIURETIC PEPTIDE: CPT | Performed by: STUDENT IN AN ORGANIZED HEALTH CARE EDUCATION/TRAINING PROGRAM

## 2024-08-10 PROCEDURE — 84478 ASSAY OF TRIGLYCERIDES: CPT | Performed by: INTERNAL MEDICINE

## 2024-08-10 PROCEDURE — 258N000003 HC RX IP 258 OP 636: Performed by: INTERNAL MEDICINE

## 2024-08-10 PROCEDURE — 999N000065 XR CHEST PORT 1 VIEW

## 2024-08-10 PROCEDURE — 82962 GLUCOSE BLOOD TEST: CPT

## 2024-08-10 PROCEDURE — 258N000003 HC RX IP 258 OP 636: Performed by: STUDENT IN AN ORGANIZED HEALTH CARE EDUCATION/TRAINING PROGRAM

## 2024-08-10 PROCEDURE — 250N000009 HC RX 250: Performed by: STUDENT IN AN ORGANIZED HEALTH CARE EDUCATION/TRAINING PROGRAM

## 2024-08-10 PROCEDURE — 82945 GLUCOSE OTHER FLUID: CPT | Performed by: STUDENT IN AN ORGANIZED HEALTH CARE EDUCATION/TRAINING PROGRAM

## 2024-08-10 PROCEDURE — 250N000011 HC RX IP 250 OP 636: Performed by: INTERNAL MEDICINE

## 2024-08-10 PROCEDURE — 99285 EMERGENCY DEPT VISIT HI MDM: CPT | Mod: 25

## 2024-08-10 PROCEDURE — 93308 TTE F-UP OR LMTD: CPT

## 2024-08-10 PROCEDURE — 250N000011 HC RX IP 250 OP 636: Performed by: STUDENT IN AN ORGANIZED HEALTH CARE EDUCATION/TRAINING PROGRAM

## 2024-08-10 PROCEDURE — 87205 SMEAR GRAM STAIN: CPT | Performed by: STUDENT IN AN ORGANIZED HEALTH CARE EDUCATION/TRAINING PROGRAM

## 2024-08-10 PROCEDURE — 87116 MYCOBACTERIA CULTURE: CPT | Performed by: INTERNAL MEDICINE

## 2024-08-10 PROCEDURE — 87206 SMEAR FLUORESCENT/ACID STAI: CPT | Performed by: INTERNAL MEDICINE

## 2024-08-10 PROCEDURE — 999N000157 HC STATISTIC RCP TIME EA 10 MIN

## 2024-08-10 PROCEDURE — 84145 PROCALCITONIN (PCT): CPT | Performed by: STUDENT IN AN ORGANIZED HEALTH CARE EDUCATION/TRAINING PROGRAM

## 2024-08-10 PROCEDURE — 200N000001 HC R&B ICU

## 2024-08-10 PROCEDURE — 84450 TRANSFERASE (AST) (SGOT): CPT | Performed by: STUDENT IN AN ORGANIZED HEALTH CARE EDUCATION/TRAINING PROGRAM

## 2024-08-10 PROCEDURE — 99223 1ST HOSP IP/OBS HIGH 75: CPT | Performed by: STUDENT IN AN ORGANIZED HEALTH CARE EDUCATION/TRAINING PROGRAM

## 2024-08-10 PROCEDURE — 94640 AIRWAY INHALATION TREATMENT: CPT

## 2024-08-10 PROCEDURE — 0W9930Z DRAINAGE OF RIGHT PLEURAL CAVITY WITH DRAINAGE DEVICE, PERCUTANEOUS APPROACH: ICD-10-PCS | Performed by: STUDENT IN AN ORGANIZED HEALTH CARE EDUCATION/TRAINING PROGRAM

## 2024-08-10 PROCEDURE — P9047 ALBUMIN (HUMAN), 25%, 50ML: HCPCS | Performed by: INTERNAL MEDICINE

## 2024-08-10 PROCEDURE — 89050 BODY FLUID CELL COUNT: CPT | Performed by: STUDENT IN AN ORGANIZED HEALTH CARE EDUCATION/TRAINING PROGRAM

## 2024-08-10 PROCEDURE — 83615 LACTATE (LD) (LDH) ENZYME: CPT | Performed by: STUDENT IN AN ORGANIZED HEALTH CARE EDUCATION/TRAINING PROGRAM

## 2024-08-10 PROCEDURE — 3E043XZ INTRODUCTION OF VASOPRESSOR INTO CENTRAL VEIN, PERCUTANEOUS APPROACH: ICD-10-PCS | Performed by: STUDENT IN AN ORGANIZED HEALTH CARE EDUCATION/TRAINING PROGRAM

## 2024-08-10 PROCEDURE — 94660 CPAP INITIATION&MGMT: CPT

## 2024-08-10 PROCEDURE — 84443 ASSAY THYROID STIM HORMONE: CPT | Performed by: STUDENT IN AN ORGANIZED HEALTH CARE EDUCATION/TRAINING PROGRAM

## 2024-08-10 PROCEDURE — 999N000185 HC STATISTIC TRANSPORT TIME EA 15 MIN

## 2024-08-10 PROCEDURE — 85004 AUTOMATED DIFF WBC COUNT: CPT | Performed by: STUDENT IN AN ORGANIZED HEALTH CARE EDUCATION/TRAINING PROGRAM

## 2024-08-10 PROCEDURE — 83735 ASSAY OF MAGNESIUM: CPT | Performed by: STUDENT IN AN ORGANIZED HEALTH CARE EDUCATION/TRAINING PROGRAM

## 2024-08-10 PROCEDURE — 81003 URINALYSIS AUTO W/O SCOPE: CPT | Performed by: STUDENT IN AN ORGANIZED HEALTH CARE EDUCATION/TRAINING PROGRAM

## 2024-08-10 PROCEDURE — 96375 TX/PRO/DX INJ NEW DRUG ADDON: CPT | Mod: 59

## 2024-08-10 PROCEDURE — 32551 INSERTION OF CHEST TUBE: CPT

## 2024-08-10 PROCEDURE — 71260 CT THORAX DX C+: CPT

## 2024-08-10 PROCEDURE — 272N000452 HC KIT SHRLOCK 5FR POWER PICC TRIPLE LUMEN

## 2024-08-10 PROCEDURE — 250N000009 HC RX 250: Performed by: INTERNAL MEDICINE

## 2024-08-10 PROCEDURE — 82805 BLOOD GASES W/O2 SATURATION: CPT | Performed by: STUDENT IN AN ORGANIZED HEALTH CARE EDUCATION/TRAINING PROGRAM

## 2024-08-10 PROCEDURE — 250N000013 HC RX MED GY IP 250 OP 250 PS 637: Performed by: STUDENT IN AN ORGANIZED HEALTH CARE EDUCATION/TRAINING PROGRAM

## 2024-08-10 PROCEDURE — 83605 ASSAY OF LACTIC ACID: CPT | Performed by: STUDENT IN AN ORGANIZED HEALTH CARE EDUCATION/TRAINING PROGRAM

## 2024-08-10 PROCEDURE — 36569 INSJ PICC 5 YR+ W/O IMAGING: CPT

## 2024-08-10 PROCEDURE — 36415 COLL VENOUS BLD VENIPUNCTURE: CPT | Performed by: STUDENT IN AN ORGANIZED HEALTH CARE EDUCATION/TRAINING PROGRAM

## 2024-08-10 PROCEDURE — 96365 THER/PROPH/DIAG IV INF INIT: CPT | Mod: 59

## 2024-08-10 PROCEDURE — 94640 AIRWAY INHALATION TREATMENT: CPT | Mod: 76

## 2024-08-10 PROCEDURE — 71045 X-RAY EXAM CHEST 1 VIEW: CPT

## 2024-08-10 PROCEDURE — 99255 IP/OBS CONSLTJ NEW/EST HI 80: CPT | Performed by: INTERNAL MEDICINE

## 2024-08-10 PROCEDURE — 76604 US EXAM CHEST: CPT

## 2024-08-10 PROCEDURE — 84484 ASSAY OF TROPONIN QUANT: CPT | Performed by: STUDENT IN AN ORGANIZED HEALTH CARE EDUCATION/TRAINING PROGRAM

## 2024-08-10 PROCEDURE — 80053 COMPREHEN METABOLIC PANEL: CPT | Performed by: STUDENT IN AN ORGANIZED HEALTH CARE EDUCATION/TRAINING PROGRAM

## 2024-08-10 PROCEDURE — 93005 ELECTROCARDIOGRAM TRACING: CPT | Performed by: EMERGENCY MEDICINE

## 2024-08-10 PROCEDURE — 272N000278 HC DEVICE 5FR SECURACATH

## 2024-08-10 PROCEDURE — 87040 BLOOD CULTURE FOR BACTERIA: CPT | Performed by: STUDENT IN AN ORGANIZED HEALTH CARE EDUCATION/TRAINING PROGRAM

## 2024-08-10 PROCEDURE — 84157 ASSAY OF PROTEIN OTHER: CPT | Performed by: STUDENT IN AN ORGANIZED HEALTH CARE EDUCATION/TRAINING PROGRAM

## 2024-08-10 PROCEDURE — 5A09357 ASSISTANCE WITH RESPIRATORY VENTILATION, LESS THAN 24 CONSECUTIVE HOURS, CONTINUOUS POSITIVE AIRWAY PRESSURE: ICD-10-PCS | Performed by: STUDENT IN AN ORGANIZED HEALTH CARE EDUCATION/TRAINING PROGRAM

## 2024-08-10 RX ORDER — FUROSEMIDE 10 MG/ML
40 INJECTION INTRAMUSCULAR; INTRAVENOUS EVERY 12 HOURS
Status: DISCONTINUED | OUTPATIENT
Start: 2024-08-10 | End: 2024-08-10

## 2024-08-10 RX ORDER — SODIUM CHLORIDE FOR INHALATION 3 %
3 VIAL, NEBULIZER (ML) INHALATION 2 TIMES DAILY
Status: DISCONTINUED | OUTPATIENT
Start: 2024-08-10 | End: 2024-08-12

## 2024-08-10 RX ORDER — LIDOCAINE 40 MG/G
CREAM TOPICAL
Status: DISCONTINUED | OUTPATIENT
Start: 2024-08-10 | End: 2024-08-17 | Stop reason: HOSPADM

## 2024-08-10 RX ORDER — DILTIAZEM HYDROCHLORIDE 120 MG/1
120 CAPSULE, COATED, EXTENDED RELEASE ORAL DAILY
Status: DISCONTINUED | OUTPATIENT
Start: 2024-08-11 | End: 2024-08-17 | Stop reason: HOSPADM

## 2024-08-10 RX ORDER — CALCIUM CARBONATE 500 MG/1
1000 TABLET, CHEWABLE ORAL 4 TIMES DAILY PRN
Status: DISCONTINUED | OUTPATIENT
Start: 2024-08-10 | End: 2024-08-17 | Stop reason: HOSPADM

## 2024-08-10 RX ORDER — METOPROLOL TARTRATE 50 MG
50 TABLET ORAL 2 TIMES DAILY
Status: DISCONTINUED | OUTPATIENT
Start: 2024-08-10 | End: 2024-08-12

## 2024-08-10 RX ORDER — ACETAMINOPHEN 650 MG/1
650 SUPPOSITORY RECTAL EVERY 4 HOURS PRN
Status: DISCONTINUED | OUTPATIENT
Start: 2024-08-10 | End: 2024-08-17 | Stop reason: HOSPADM

## 2024-08-10 RX ORDER — PROCHLORPERAZINE MALEATE 5 MG
5 TABLET ORAL EVERY 6 HOURS PRN
Status: DISCONTINUED | OUTPATIENT
Start: 2024-08-10 | End: 2024-08-17 | Stop reason: HOSPADM

## 2024-08-10 RX ORDER — FUROSEMIDE 10 MG/ML
40 INJECTION INTRAMUSCULAR; INTRAVENOUS ONCE
Status: COMPLETED | OUTPATIENT
Start: 2024-08-10 | End: 2024-08-10

## 2024-08-10 RX ORDER — ACETAMINOPHEN 325 MG/1
650 TABLET ORAL ONCE
Status: COMPLETED | OUTPATIENT
Start: 2024-08-10 | End: 2024-08-10

## 2024-08-10 RX ORDER — PROCHLORPERAZINE 25 MG
12.5 SUPPOSITORY, RECTAL RECTAL EVERY 12 HOURS PRN
Status: DISCONTINUED | OUTPATIENT
Start: 2024-08-10 | End: 2024-08-17 | Stop reason: HOSPADM

## 2024-08-10 RX ORDER — VANCOMYCIN HYDROCHLORIDE
1250 DAILY
Status: DISCONTINUED | OUTPATIENT
Start: 2024-08-11 | End: 2024-08-11

## 2024-08-10 RX ORDER — VANCOMYCIN HYDROCHLORIDE
1250 ONCE
Status: COMPLETED | OUTPATIENT
Start: 2024-08-10 | End: 2024-08-10

## 2024-08-10 RX ORDER — POLYETHYLENE GLYCOL 3350 17 G/17G
17 POWDER, FOR SOLUTION ORAL DAILY PRN
Status: DISCONTINUED | OUTPATIENT
Start: 2024-08-10 | End: 2024-08-17 | Stop reason: HOSPADM

## 2024-08-10 RX ORDER — PIPERACILLIN SODIUM, TAZOBACTAM SODIUM 3; .375 G/15ML; G/15ML
3.38 INJECTION, POWDER, LYOPHILIZED, FOR SOLUTION INTRAVENOUS ONCE
Status: COMPLETED | OUTPATIENT
Start: 2024-08-10 | End: 2024-08-10

## 2024-08-10 RX ORDER — FUROSEMIDE 10 MG/ML
20 INJECTION INTRAMUSCULAR; INTRAVENOUS EVERY 8 HOURS
Status: DISCONTINUED | OUTPATIENT
Start: 2024-08-10 | End: 2024-08-17 | Stop reason: HOSPADM

## 2024-08-10 RX ORDER — ALBUMIN (HUMAN) 12.5 G/50ML
25 SOLUTION INTRAVENOUS ONCE
Status: COMPLETED | OUTPATIENT
Start: 2024-08-10 | End: 2024-08-10

## 2024-08-10 RX ORDER — METHYLPREDNISOLONE SODIUM SUCCINATE 40 MG/ML
40 INJECTION, POWDER, LYOPHILIZED, FOR SOLUTION INTRAMUSCULAR; INTRAVENOUS EVERY 12 HOURS
Status: DISCONTINUED | OUTPATIENT
Start: 2024-08-10 | End: 2024-08-13

## 2024-08-10 RX ORDER — IOPAMIDOL 755 MG/ML
100 INJECTION, SOLUTION INTRAVASCULAR ONCE
Status: COMPLETED | OUTPATIENT
Start: 2024-08-10 | End: 2024-08-10

## 2024-08-10 RX ORDER — SENNOSIDES 8.6 MG
1 TABLET ORAL 2 TIMES DAILY
Status: DISCONTINUED | OUTPATIENT
Start: 2024-08-10 | End: 2024-08-17 | Stop reason: HOSPADM

## 2024-08-10 RX ORDER — ONDANSETRON 2 MG/ML
4 INJECTION INTRAMUSCULAR; INTRAVENOUS EVERY 6 HOURS PRN
Status: DISCONTINUED | OUTPATIENT
Start: 2024-08-10 | End: 2024-08-11

## 2024-08-10 RX ORDER — PIPERACILLIN SODIUM, TAZOBACTAM SODIUM 3; .375 G/15ML; G/15ML
3.38 INJECTION, POWDER, LYOPHILIZED, FOR SOLUTION INTRAVENOUS EVERY 8 HOURS
Status: DISCONTINUED | OUTPATIENT
Start: 2024-08-10 | End: 2024-08-13

## 2024-08-10 RX ORDER — DILTIAZEM HYDROCHLORIDE 120 MG/1
120 CAPSULE, COATED, EXTENDED RELEASE ORAL DAILY
Status: DISCONTINUED | OUTPATIENT
Start: 2024-08-11 | End: 2024-08-12

## 2024-08-10 RX ORDER — IPRATROPIUM BROMIDE AND ALBUTEROL SULFATE 2.5; .5 MG/3ML; MG/3ML
3 SOLUTION RESPIRATORY (INHALATION)
Status: DISCONTINUED | OUTPATIENT
Start: 2024-08-10 | End: 2024-08-12

## 2024-08-10 RX ORDER — FLUTICASONE FUROATE AND VILANTEROL 200; 25 UG/1; UG/1
1 POWDER RESPIRATORY (INHALATION) DAILY
Status: DISCONTINUED | OUTPATIENT
Start: 2024-08-10 | End: 2024-08-17 | Stop reason: HOSPADM

## 2024-08-10 RX ORDER — ATORVASTATIN CALCIUM 10 MG/1
20 TABLET, FILM COATED ORAL AT BEDTIME
Status: DISCONTINUED | OUTPATIENT
Start: 2024-08-10 | End: 2024-08-17 | Stop reason: HOSPADM

## 2024-08-10 RX ORDER — IPRATROPIUM BROMIDE AND ALBUTEROL SULFATE 2.5; .5 MG/3ML; MG/3ML
SOLUTION RESPIRATORY (INHALATION)
Status: DISCONTINUED
Start: 2024-08-10 | End: 2024-08-10 | Stop reason: HOSPADM

## 2024-08-10 RX ORDER — ONDANSETRON 4 MG/1
4 TABLET, ORALLY DISINTEGRATING ORAL EVERY 6 HOURS PRN
Status: DISCONTINUED | OUTPATIENT
Start: 2024-08-10 | End: 2024-08-11

## 2024-08-10 RX ORDER — NOREPINEPHRINE BITARTRATE 0.02 MG/ML
.01-.6 INJECTION, SOLUTION INTRAVENOUS CONTINUOUS
Status: DISCONTINUED | OUTPATIENT
Start: 2024-08-10 | End: 2024-08-13

## 2024-08-10 RX ORDER — LIDOCAINE 40 MG/G
CREAM TOPICAL
Status: DISCONTINUED | OUTPATIENT
Start: 2024-08-10 | End: 2024-08-11

## 2024-08-10 RX ORDER — DEXTROSE MONOHYDRATE 25 G/50ML
25-50 INJECTION, SOLUTION INTRAVENOUS
Status: DISCONTINUED | OUTPATIENT
Start: 2024-08-10 | End: 2024-08-17 | Stop reason: HOSPADM

## 2024-08-10 RX ORDER — GABAPENTIN 600 MG/1
600 TABLET ORAL 3 TIMES DAILY
Status: DISCONTINUED | OUTPATIENT
Start: 2024-08-10 | End: 2024-08-17 | Stop reason: HOSPADM

## 2024-08-10 RX ORDER — AMOXICILLIN 250 MG
1 CAPSULE ORAL 2 TIMES DAILY PRN
Status: DISCONTINUED | OUTPATIENT
Start: 2024-08-10 | End: 2024-08-17 | Stop reason: HOSPADM

## 2024-08-10 RX ORDER — NICOTINE POLACRILEX 4 MG
15-30 LOZENGE BUCCAL
Status: DISCONTINUED | OUTPATIENT
Start: 2024-08-10 | End: 2024-08-17 | Stop reason: HOSPADM

## 2024-08-10 RX ORDER — AMOXICILLIN 250 MG
2 CAPSULE ORAL 2 TIMES DAILY PRN
Status: DISCONTINUED | OUTPATIENT
Start: 2024-08-10 | End: 2024-08-17 | Stop reason: HOSPADM

## 2024-08-10 RX ORDER — SUCRALFATE 1 G/1
1 TABLET ORAL
Status: DISCONTINUED | OUTPATIENT
Start: 2024-08-10 | End: 2024-08-17 | Stop reason: HOSPADM

## 2024-08-10 RX ORDER — SALIVA STIMULANT COMB. NO.3
1 SPRAY, NON-AEROSOL (ML) MUCOUS MEMBRANE 4 TIMES DAILY
Status: DISCONTINUED | OUTPATIENT
Start: 2024-08-10 | End: 2024-08-17 | Stop reason: HOSPADM

## 2024-08-10 RX ORDER — OMEPRAZOLE 40 MG/1
40 CAPSULE, DELAYED RELEASE ORAL DAILY
Status: ON HOLD | COMMUNITY
End: 2024-09-08

## 2024-08-10 RX ORDER — ACETAMINOPHEN 325 MG/1
650 TABLET ORAL EVERY 4 HOURS PRN
Status: DISCONTINUED | OUTPATIENT
Start: 2024-08-10 | End: 2024-08-17 | Stop reason: HOSPADM

## 2024-08-10 RX ORDER — ALBUTEROL SULFATE 90 UG/1
2 AEROSOL, METERED RESPIRATORY (INHALATION) EVERY 4 HOURS PRN
Status: DISCONTINUED | OUTPATIENT
Start: 2024-08-10 | End: 2024-08-17 | Stop reason: HOSPADM

## 2024-08-10 RX ADMIN — ATORVASTATIN CALCIUM 20 MG: 10 TABLET, FILM COATED ORAL at 21:01

## 2024-08-10 RX ADMIN — FUROSEMIDE 20 MG: 10 INJECTION, SOLUTION INTRAMUSCULAR; INTRAVENOUS at 13:45

## 2024-08-10 RX ADMIN — IPRATROPIUM BROMIDE AND ALBUTEROL SULFATE 3 ML: .5; 3 SOLUTION RESPIRATORY (INHALATION) at 19:00

## 2024-08-10 RX ADMIN — VANCOMYCIN HYDROCHLORIDE 1250 MG: 5 INJECTION, POWDER, LYOPHILIZED, FOR SOLUTION INTRAVENOUS at 14:28

## 2024-08-10 RX ADMIN — SODIUM CHLORIDE 500 ML: 9 INJECTION, SOLUTION INTRAVENOUS at 13:04

## 2024-08-10 RX ADMIN — GABAPENTIN 600 MG: 600 TABLET, FILM COATED ORAL at 20:36

## 2024-08-10 RX ADMIN — ACETAMINOPHEN 650 MG: 325 TABLET ORAL at 15:33

## 2024-08-10 RX ADMIN — SODIUM CHLORIDE SOLN NEBU 3% 3 ML: 3 NEBU SOLN at 19:00

## 2024-08-10 RX ADMIN — IPRATROPIUM BROMIDE AND ALBUTEROL SULFATE 3 ML: .5; 3 SOLUTION RESPIRATORY (INHALATION) at 16:27

## 2024-08-10 RX ADMIN — ACETAMINOPHEN 650 MG: 325 TABLET ORAL at 21:34

## 2024-08-10 RX ADMIN — SUCRALFATE 1 G: 1 TABLET ORAL at 18:31

## 2024-08-10 RX ADMIN — METHYLPREDNISOLONE SODIUM SUCCINATE 40 MG: 40 INJECTION, POWDER, FOR SOLUTION INTRAMUSCULAR; INTRAVENOUS at 18:24

## 2024-08-10 RX ADMIN — NOREPINEPHRINE BITARTRATE 0.03 MCG/KG/MIN: 0.02 INJECTION, SOLUTION INTRAVENOUS at 18:13

## 2024-08-10 RX ADMIN — SENNOSIDES 1 TABLET: 8.6 TABLET, FILM COATED ORAL at 20:36

## 2024-08-10 RX ADMIN — IOPAMIDOL 90 ML: 755 INJECTION, SOLUTION INTRAVENOUS at 12:01

## 2024-08-10 RX ADMIN — NOREPINEPHRINE BITARTRATE 0.15 MCG/KG/MIN: 0.02 INJECTION, SOLUTION INTRAVENOUS at 23:25

## 2024-08-10 RX ADMIN — VASOPRESSIN 2.4 UNITS/HR: 20 INJECTION INTRAVENOUS at 20:37

## 2024-08-10 RX ADMIN — LIDOCAINE HYDROCHLORIDE 1 ML: 10 INJECTION, SOLUTION EPIDURAL; INFILTRATION; INTRACAUDAL; PERINEURAL at 16:15

## 2024-08-10 RX ADMIN — PIPERACILLIN AND TAZOBACTAM 3.38 G: 3; .375 INJECTION, POWDER, FOR SOLUTION INTRAVENOUS at 18:18

## 2024-08-10 RX ADMIN — PIPERACILLIN AND TAZOBACTAM 3.38 G: 3; .375 INJECTION, POWDER, FOR SOLUTION INTRAVENOUS at 13:44

## 2024-08-10 RX ADMIN — ALBUMIN HUMAN 25 G: 0.25 SOLUTION INTRAVENOUS at 20:15

## 2024-08-10 RX ADMIN — FUROSEMIDE 40 MG: 10 INJECTION, SOLUTION INTRAMUSCULAR; INTRAVENOUS at 13:45

## 2024-08-10 RX ADMIN — SODIUM CHLORIDE 250 ML: 9 INJECTION, SOLUTION INTRAVENOUS at 17:34

## 2024-08-10 RX ADMIN — SODIUM CHLORIDE SOLN NEBU 3% 3 ML: 3 NEBU SOLN at 14:16

## 2024-08-10 RX ADMIN — IPRATROPIUM BROMIDE AND ALBUTEROL SULFATE 3 ML: .5; 3 SOLUTION RESPIRATORY (INHALATION) at 14:16

## 2024-08-10 ASSESSMENT — COLUMBIA-SUICIDE SEVERITY RATING SCALE - C-SSRS
1. IN THE PAST MONTH, HAVE YOU WISHED YOU WERE DEAD OR WISHED YOU COULD GO TO SLEEP AND NOT WAKE UP?: NO
6. HAVE YOU EVER DONE ANYTHING, STARTED TO DO ANYTHING, OR PREPARED TO DO ANYTHING TO END YOUR LIFE?: NO
2. HAVE YOU ACTUALLY HAD ANY THOUGHTS OF KILLING YOURSELF IN THE PAST MONTH?: NO

## 2024-08-10 ASSESSMENT — ACTIVITIES OF DAILY LIVING (ADL)
ADLS_ACUITY_SCORE: 38
ADLS_ACUITY_SCORE: 38
ADLS_ACUITY_SCORE: 40
ADLS_ACUITY_SCORE: 38
ADLS_ACUITY_SCORE: 38
DEPENDENT_IADLS:: CLEANING;COOKING;LAUNDRY;SHOPPING;MEAL PREPARATION;MEDICATION MANAGEMENT;TRANSPORTATION
ADLS_ACUITY_SCORE: 38
ADLS_ACUITY_SCORE: 40

## 2024-08-10 NOTE — PROGRESS NOTES
Writer called to room yo assist with chest tube placement. Upon assessment, patient is lethargic, hypoxic and hypotensive. Placed on non rebreather at 15L. Sats responded well. Atrium set up. Writer to assist with chest tube set up. Continue to monitor.

## 2024-08-10 NOTE — CONSULTS
Care Management Initial Consult      General Information  Assessment completed with: VM-chart review,    Type of CM/SW Visit: Initial Assessment  Primary Care Provider verified and updated as needed: Yes   Readmission within the last 30 days: unable to assess   Return Category: Exacerbation of disease  Reason for Consult: discharge planning, transportation  Advance Care Planning: Advance Care Planning Reviewed: no concerns identified        Communication Assessment  Patient's communication style: spoken language (English or Bilingual)        Cognitive  Cognitive/Neuro/Behavioral: WDL                      Living Environment:   People in home: child(david), adult     Current living Arrangements: house      Able to return to prior arrangements: yes     Family/Social Support:  Care provided by: self, child(david)  Provides care for: no one, unable/limited ability to care for self  Marital Status:   Children, Friend          Description of Support System: Supportive, Involved    Support Assessment: Adequate family and caregiver support, Adequate social supports    Current Resources:   Patient receiving home care services: No  Community Resources: County Worker, DME, Lifeline, PCA, Housekeeping/Chore Agency, County Programs  Equipment currently used at home: cane, quad, commode chair, glucometer, walker, rolling, shower chair  Supplies currently used at home: Incontinence Supplies, Diabetic Supplies, Oxygen Tubing/Supplies, Nebulizer tubing    Employment/Financial:  Employment Status: disabled     Financial Concerns: none   Referral to Financial Worker: No     Does the patient's insurance plan have a 3 day qualifying hospital stay waiver?  Yes     Which insurance plan 3 day waiver is available? Alternative insurance waiver    Will the waiver be used for post-acute placement? Undetermined at this time    Lifestyle & Psychosocial Needs:  Social Determinants of Health     Food Insecurity: Low Risk  (12/26/2023)    Food  Insecurity     Within the past 12 months, did you worry that your food would run out before you got money to buy more?: No     Within the past 12 months, did the food you bought just not last and you didn t have money to get more?: No   Depression: Not at risk (8/8/2024)    PHQ-2     PHQ-2 Score: 1   Housing Stability: Low Risk  (12/26/2023)    Housing Stability     Do you have housing? : Yes     Are you worried about losing your housing?: No   Tobacco Use: High Risk (8/10/2024)    Patient History     Smoking Tobacco Use: Some Days     Smokeless Tobacco Use: Never     Passive Exposure: Never   Financial Resource Strain: Low Risk  (12/26/2023)    Financial Resource Strain     Within the past 12 months, have you or your family members you live with been unable to get utilities (heat, electricity) when it was really needed?: No   Alcohol Use: Not on file   Transportation Needs: Low Risk  (12/26/2023)    Transportation Needs     Within the past 12 months, has lack of transportation kept you from medical appointments, getting your medicines, non-medical meetings or appointments, work, or from getting things that you need?: No   Physical Activity: Not on file   Interpersonal Safety: Low Risk  (4/1/2024)    Interpersonal Safety     Do you feel physically and emotionally safe where you currently live?: Yes     Within the past 12 months, have you been hit, slapped, kicked or otherwise physically hurt by someone?: No     Within the past 12 months, have you been humiliated or emotionally abused in other ways by your partner or ex-partner?: No   Stress: Not on file   Social Connections: Not on file   Health Literacy: Not on file     Functional Status:  Prior to admission patient needed assistance:   Dependent ADLs:: Ambulation-walker, Ambulation-cane, Bathing, Grooming, Transfers  Dependent IADLs:: Cleaning, Cooking, Laundry, Shopping, Meal Preparation, Medication Management, Transportation  Assessment of Functional Status: Not  "at  functional baseline    Mental Health Status:  Mental Health Status: No Current Concerns       Chemical Dependency Status:  Chemical Dependency Status: No Current Concerns           Values/Beliefs:  Spiritual, Cultural Beliefs, Pentecostalism Practices, Values that affect care: no             Additional Information:  Writer reviewed pt's chart to obtain an initial assessment as pt was recently admitted and is currently unable to effectively communicate for CM assessment. Pt resides in the home of her son and daughter-in-law Cynthia who is also pt's PCA. Family assists with I/ADL support both as family and as PCA as needed. No additional in-home services identified. Home O2 and ambulatory DME utilized. No concerns identified with eventual improvement and return to home.    8/10 per Intensivist Dr.Zeballos Joseph, RADHA-\"74 year old female with history of HTN, CAD, HFpEF, afib on DOAC, aortic stenosis s/p TAVR, s/p pacemaker, COPD, tobacco user. Recent admission 7/12 to 7/17 with acute hypercarbic respiratory failure, pneumonia and COVI19 viral infection. CT scan showed RUL collapse, right effusion. Sputum Cx (+) E coli. Developed pneumothorax post right side thoracentesis. Improvement of pneumothorax in serial CXR. Discharged home on O2 supplementation 3 LPM, and Abx and follow up in clinic. Presents to ED on 8/10/2024 for evaluation of shortness of breath. Patient was in moderate respiratory distress, hypotensive, hypoxic. O2 supplementation NRB mask.\"    SLP Consult scheduled 8/11. Anticipate improvement and return to home/the care of family as needed. No additional services or DME anticipated as needed for discharge. CM to follow for medical progression and changes to current anticipated discharge disposition. Family anticipated to transport pt at time of discharge.    Deric Munguia RN      "

## 2024-08-10 NOTE — PROGRESS NOTES
Patient placed on 4L via NC. Neb that was given in EMS was continued in ED but stopped due to HR of 154.     Carmita Rome, RT

## 2024-08-10 NOTE — PROGRESS NOTES
Patient placed on BiPAP 10/5, RR 12, 40% per MD for VBG results. Patients breathing is ok at this time.   Duoneb QID and Sodium Chloride BID given. Breath sounds diminished. VBG is trending in right direction.   RT will continue to monitor    Carmita Rome, RT

## 2024-08-10 NOTE — PHARMACY-VANCOMYCIN DOSING SERVICE
"Pharmacy Vancomycin Initial Note  Date of Service August 10, 2024  Patient's  1950  74 year old, female    Indication: Community Acquired Pneumonia    Current estimated CrCl = Estimated Creatinine Clearance: 53.2 mL/min (based on SCr of 0.93 mg/dL).    Creatinine for last 3 days  8/10/2024:  9:37 AM Creatinine 0.93 mg/dL    Recent Vancomycin Level(s) for last 3 days  No results found for requested labs within last 3 days.      Vancomycin IV Administrations (past 72 hours)        No vancomycin orders with administrations in past 72 hours.                    Nephrotoxins and other renal medications (From now, onward)      Start     Dose/Rate Route Frequency Ordered Stop    24 0000  vancomycin (VANCOCIN) 1,000 mg in NaCl 0.9% 200 mL intermittent infusion         1,000 mg  over 60 Minutes Intravenous EVERY 12 HOURS 08/10/24 1245      08/10/24 1830  piperacillin-tazobactam (ZOSYN) 3.375 g vial to attach to  mL bag        Note to Pharmacy: For [facility], [facility] and [facility]: For Zosyn-naive patients, use the \"Zosyn initial dose + extended infusion\" order panel.    3.375 g  over 240 Minutes Intravenous EVERY 8 HOURS 08/10/24 1245      08/10/24 1330  furosemide (LASIX) injection 20 mg         20 mg  over 1-3 Minutes Intravenous EVERY 8 HOURS 08/10/24 1245      08/10/24 1300  furosemide (LASIX) injection 40 mg         40 mg  over 1-3 Minutes Intravenous ONCE 08/10/24 1253      08/10/24 1230  piperacillin-tazobactam (ZOSYN) 3.375 g vial to attach to  mL bag         3.375 g  over 30 Minutes Intravenous ONCE 08/10/24 1203      08/10/24 1230  vancomycin (VANCOCIN) 1,250 mg in 0.9% NaCl 250 mL intermittent infusion         1,250 mg  166.7 mL/hr over 90 Minutes Intravenous ONCE 08/10/24 1211              Contrast Orders - past 72 hours (72h ago, onward)      Start     Dose/Rate Route Frequency Stop    08/10/24 1200  iopamidol (ISOVUE-370) solution 100 mL         100 mL Intravenous ONCE 08/10/24 1201      "       [employer] Prediction of Planned Initial Vancomycin Regimen  Loading dose: 1250 mg at 13:30 08/10/2024.  Regimen: 1250 mg IV every 24 hours.  Start time: 13:30 on 08/11/2024  Exposure target: AUC24 (range)400-600 mg/L.hr   AUC24,ss: 507 mg/L.hr  Probability of AUC24 > 400: 76 %  Ctrough,ss: 14.8 mg/L  Probability of Ctrough,ss > 20: 23 %  Probability of nephrotoxicity (Lodise MODESTO 2009): 10 %          Plan:  Start vancomycin  1250mg IV q24h starting 8/11/2024 1330 following loading dose   Vancomycin monitoring method: AUC  Vancomycin therapeutic monitoring goal: 400-600 mg*h/L  Pharmacy will check vancomycin levels as appropriate in 1-3 Days.    Serum creatinine levels will be ordered daily for the first week of therapy and at least twice weekly for subsequent weeks.      Ishmael Werner, RPH

## 2024-08-10 NOTE — PHARMACY-ADMISSION MEDICATION HISTORY
Pharmacist Admission Medication History    Admission medication history is complete. The information provided in this note is only as accurate as the sources available at the time of the update.    Information Source(s): Patient, Caregiver, Clinic records, and CareEverywhere/SureScripts via in-person and phone    Pertinent Information: Patient's PCA Cynthia ([phone]) was able to confirm current medications and the patient confirmed last administration times.     Changes made to PTA medication list:  Added: None  Deleted: Meclizine, Eucerin, and Debrox drops  Changed: None    Allergies reviewed with patient and updates made in EHR: yes    Medication History Completed By: Ishmael Werner [facility] 8/10/2024 12:58 PM    PTA Med List   Medication Sig Last Dose    albuterol (PROAIR HFA/PROVENTIL HFA/VENTOLIN HFA) 108 (90 Base) MCG/ACT inhaler INHALE 2 PUFFS INTO THE LUNGS EVERY 4 HOURS AS NEEDED FOR WHEEZING PRN    apixaban ANTICOAGULANT (ELIQUIS) 5 MG tablet Take 1 tablet (5 mg) by mouth 2 times daily 8/10/2024 at am    atorvastatin (LIPITOR) 20 MG tablet TAKE 1 TABLET(20 MG) BY MOUTH AT BEDTIME 8/9/2024 at pm    budesonide-formoterol (SYMBICORT) 160-4.5 MCG/ACT Inhaler INHALE 2 PUFFS INTO THE LUNGS TWICE DAILY 8/10/2024 at am    diltiazem ER COATED BEADS (CARDIZEM CD/CARTIA XT) 120 MG 24 hr capsule TAKE 1 CAPSULE(120 MG) BY MOUTH DAILY 8/10/2024 at am    Emollient (EUCERIN ORIGINAL HEALING) LOTN APPLY LIBERALLY TO DRY SKIN TWICE DAILY AS NEEDED PRN    empagliflozin (JARDIANCE) 10 MG TABS tablet Take 1 tablet (10 mg) by mouth daily 8/10/2024 at am    furosemide (LASIX) 20 MG tablet Take 2 tablets (40 mg) by mouth daily 8/10/2024 at am    gabapentin (NEURONTIN) 600 MG tablet TAKE 1 TABLET(600 MG) BY MOUTH THREE TIMES DAILY 8/10/2024 at am    ipratropium - albuterol 0.5 mg/2.5 mg/3 mL (DUONEB) 0.5-2.5 (3) MG/3ML neb solution INHALE 1 VIAL VIA NEBULIZER EVERY 6 HOURS AS NEEDED FOR SHORTNESS OF BREATH OR WHEEZING PRN     metoprolol tartrate (LOPRESSOR) 50 MG tablet Take 1 tablet (50 mg) by mouth 2 times daily 8/10/2024 at am    nystatin (NYSTOP) 369399 UNIT/GM external powder APPLY TO THE AFFECTED AREA(S) 2-3 TIMES DAILY AS NEEDED PRN    omeprazole (PRILOSEC) 40 MG DR capsule Take 40 mg by mouth daily 8/10/2024 at am    oxyCODONE IR (ROXICODONE) 10 MG tablet Take 1 tablet (10 mg) by mouth every 6 hours as needed for moderate to severe pain PRN    polyethylene glycol (MIRALAX) 17 GM/Dose powder Take 17 g by mouth daily as needed for constipation PRN    potassium chloride john ER (KLOR-CON M20) 20 MEQ CR tablet TAKE 1 TABLET(20 MEQ) BY MOUTH DAILY 8/10/2024 at am    sennosides (SENOKOT) 8.6 MG tablet Take 1 tablet by mouth 2 times daily 8/10/2024 at am    sucralfate (CARAFATE) 1 GM tablet CRUSH ONE TABLET AND MIX WITH A LLITTLE WATER AND SWALLOW FOUR TIMES DAILY 8/10/2024 at am

## 2024-08-10 NOTE — ED NOTES
Pt reports 8/10 pain on R side where chest tube insertion site is. Denies increased shortness of breath. No changes visually at site and there appears to be scant pleural fluid in tube. MD Brewer notified.

## 2024-08-10 NOTE — H&P
[facility]    History and Physical - Hospitalist Service       Date of Admission:  8/10/2024    Assessment & Plan      Mary Kay Tejada is a 74 year old female admitted on 8/10/2024. She has a pmhx of paroxysmal atrial flutter on apixaban, COPD, hyperlipidemia, diabetes mellitus type 2 with neuropathy, reflux, dizziness, tobacco use, fibromyalgia and chronic pain syndrome, prior pleural effusions and most recent admission in July found with E. coli pneumonia and a right sided pleural effusion which was consistent with a transudate and negative cultures.      Presentation of shortness of breath progressive over several days/week with acute worsening on day of admission.  Arrived to ER hypotensive MAP 60s and tachycardia 150s. Found with large right sided pleural effusion and respiratory acidosis and s/p bedside pigtail catheter chest tube placement in ER. On 6L then on bipap. Work of breathing improving but still soft pressures. HR now 70s. Fluid studies sent. BNP 8987, wbc 13.1, hgb 11.4. Started on zosyn, continued. Given lasix 60mg iv in total (40mg x1 and then 20mg q8 hrs). As there was an initial question of possible ICU cares, Pulmonary/ICU is already involved prior to admission, will consult for continuity and also chest tube management. Continuing with q8 hrs gentle diuresis for now.  Chest tube was noted to have its tip at the right apex with plan for IR evaluation and RE-positioning.  N.p.o. until this is completed.  If pressures/MAP <65, would consider starting pressors overnight.     Addendum-it is 5 PM and we have not heard from the IR team; also unclear if pulmonary already discussed with them or not - so I called the on-call number and had a page sent to get clarification on care plans.  If no procedure here today, then would start a dysphagia diet and n.p.o. at midnight  Rayo Mendoza MD on 8/10/2024 at 5:05 PM   Addendum 2- appreciate conversation with Dr. Osorio- no  procedure tonight; as pt is stable, discussed w/ RN too, will start dysphagia diet and npo at midnight. Re-assess on 8/11.   Rayo Mendoza MD on 8/10/2024 at 5:10 PM    Addendum 3- received page with MAP at 55; tried a small 250ccs bolus, and just called and still hypotensive, would start NE for pressor support. Dr. Julio aware and will order. Discussed w/ bedside team.   Rayo Mendoza MD on 8/10/2024 at 6:14 PM    Addendum 4- appreciate follow up with Dr. Montejo- vasopressin added and albumin, 1400 out from tube already.  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  MAP at the most recent check was 69, and patient is otherwise relatively more stable compared to presentation in ER.  She would need to chest tube repositioned and appreciate pulmonary ID following and interventional radiology follow-up.  Patient will be n.p.o. until this is completed.  Will continue with broad-spectrum antibiotics, Zosyn and vancomycin perform dosing, and also bronchodilators as well as saline nebulizers and dysphagia diet and speech to see to assess.  Holding apixaban until after repositioning.  May need pressors overnight if MAP is persistently less than 65.   Plan:  - admit to hospital  -Consult pulmonary/intensivist  -defer chest tube management to pulmonary, appreciate IR input for repositioning  -Keep n.p.o. for now until tube is repositioned  - I did check w/ bedside nursing again for an update on IR plans as pt is still npo at the moment, 4:34 PM.   -Asked bedside team (will be going to 3rd floor) to page IR if no callback in half an an hour, around 5pm to get an update on plans?  -Scheduled DuoNebs and RT to  adjust  -Saline nebulizers  -Dysphagia diet  -Speech to see  -Continue vancomycin per pharmacy dosing  -Continue Zosyn, every 8 hours  -Continue Lasix, currently pleuropulmonary 20 mg IV every 8  - follow pending studies  - adding a viral covid/flu/rsv panel (has been 1 month since covid and unclear exposures)  -follow fluid studies and cultures   -I's and O's and weights  -fluid Restrict to 1.8 L    paroxysmal atrial flutter on apixaban  Very high heart rate to 150s on ER arrival but now in the 70s  A- on arrival 150s but now 70s-80s  P:  - continue metoprolol and diltiazem w/ hold parameters  - hold apixaban until chest tube repositioned by IR     Hyperlipidemia  A/p- cont statin    diabetes mellitus type 2 with neuropathy  A/p- hold jardiance, ldssi and hypoglycemia protocol for now    reflux  A/p- stable, auto-sub ppi     fibromyalgia and chronic pain syndrome  A/p- hold prior pain meds w/c for respiratory depression; tylenol for now    Hx of TAVR  Hx of HFpEF  A/p- see primary issue, continue diuresis as above for now, I/Os and weights    Chronic constipation  A/p- stable, continue prn PTA meds            Diet: Pureed Diet (level 4) Liquidized/Moderately Thick (level 3)  NPO per Anesthesia Guidelines for Procedure/Surgery Except for: Meds  Fluid restriction 1800 ML FLUID    DVT Prophylaxis: Pneumatic Compression Devices, Ambulate every shift, and holding DOAC per pulmonary until chest tube is repositioned   Chairez Catheter: Not present  Lines: None     Cardiac Monitoring: ACTIVE order. Indication: Tachyarrhythmias, acute (48 hours)  Code Status: Full Code     Clinically Significant Risk Factors Present on Admission              # Hypoalbuminemia: Lowest albumin = 3.1 g/dL at 8/10/2024  9:37 AM, will monitor as appropriate    # Drug Induced Coagulation Defect: home medication list includes an anticoagulant medication    # Hypertension: Noted on problem list             # Financial/Environmental Concerns: none   #  Pacemaker present             Disposition Plan     Medically Ready for Discharge: Anticipated in 2-4 Days           Rayo Mendoza MD  Hospitalist Service  [facility]  Securely message with Site Tour (more info)  Text page via [employer] Paging/Directory     ______________________________________________________________________    Chief Complaint   Shortness of breath     History is obtained from the patient    History of Present Illness   Mary Kay Tejada is a 74 year old female who has a pmhx of paroxysmal atrial flutter on apixaban, COPD, hyperlipidemia, diabetes mellitus type 2 with neuropathy, reflux, dizziness, tobacco use, fibromyalgia and chronic pain syndrome, prior pleural effusions and most recent admission in July found with E. coli pneumonia and a right sided pleural effusion which was consistent with a transudate and negative cultures.      Presentation of shortness of breath progressive over several days/week with acute worsening on day of admission.  Arrived to ER hypotensive MAP 60s and tachycardia 150s. Found with large right sided pleural effusion and respiratory acidosis and s/p bedside pigtail catheter chest tube placement in ER. On 6L then on bipap. Work of breathing improving but still soft pressures. HR now 70s. Fluid studies sent. BNP 8987, wbc 13.1, hgb 11.4. Started on zosyn, continued. Given lasix 60mg iv in total (40mg x1 and then 20mg q8 hrs). As there was an initial question of possible ICU cares, Pulmonary/ICU is already involved prior to admission, will consult for continuity and also chest tube management. Continuing with q8 hrs gentle diuresis for now.  Chest tube was noted to have its tip at the right apex with plan for IR evaluation and RE-positioning.  N.p.o. until this is completed.      On interview, the patient confirms the aforementioned and also reports several days/week of progressive shortness of breath which acutely worsened today which prompted  presentation.  We discussed the chest tube and antibiotics.  She reports having no other symptoms, confirmed prior COVID last month but has already been a month and unsure about new exposures, no recent travels.  We discussed rechecking for that with our panel here.  Also discussed pulmonary input. She reports no other symptoms noted including no headaches, fevers, chills, chest pain or coughs, no abdominal pain, no diarrhea or constipation, no dysuria, no neurologic changes or sensory changes. The pt is Full code. We discussed that if pressures/MAP <65, would consider starting pressors overnight and that we will continue broad abx for now.  Answered all of her questions. I did check w/ bedside nursing again for an update on IR plans as pt is still npo at the moment, 4:34 PM. Asked bedside team (will be going to 3rd floor) to page IR if no callback in half an an hour, around 5pm to get an update on plans...       Past Medical History    Past Medical History:   Diagnosis Date    Anemia     Aortic stenosis     Aortic valve disorder     Atrial fibrillation (H)     Atrial flutter (H)     Benign neoplasm of adenomatous polyp     large intestine     Chronic constipation     Chronic heart failure with preserved ejection fraction (H) 02/29/2024    Chronic pain syndrome     Congestive heart failure (H)     COPD (chronic obstructive pulmonary disease) (H)     Oxygen at night     Dependence on supplemental oxygen     Oxygen at noc, during the day as needed    Depression     Diabetes mellitus (H)     Dry eye syndrome     Fibromyalgia     Ganglion     left wrist    GERD (gastroesophageal reflux disease)     Hyperlipidemia     Hypertension     Hypokalemia     Infective otitis externa, unspecified     Created by Conversion     Larynx edema     Lung disease     Malignant neoplasm of vulva (H)     Created by Conversion [facility] Annotation: Apr 17 2007  8:24AM - Cammy Bui:  resection per Dr. Alfonso Mane 9/06;   Replacement Utility updated for latest IMO load    Medial epicondylitis     Onychomycosis     Osteoarthritis     Peptic ulcer     Polyneuropathy     Vulvar malignant neoplasm (H)        Past Surgical History   Past Surgical History:   Procedure Laterality Date    BIOPSY BREAST Right     BIOPSY BREAST Right 01/28/2015    BIOPSY BREAST Right 01/28/2015    Procedure: RIGHT BREAST BIOPSY AFTER WIRE LOCALIZATION AT 0940;  Surgeon: Renée Soriano MD;  Location: [facility] [place];  Service:     BIOPSY OF BREAST, INCISIONAL      Description: Incisional Breast Biopsy;  Recorded: 11/13/2007;  Comments: benign    COLONOSCOPY N/A 06/14/2019    Procedure: COLONOSCOPY;  Surgeon: Eduardo Mora MD;  Location: [facility] [place];  Service: Gastroenterology    CV CORONARY ANGIOGRAM N/A 02/08/2024    Procedure: CV CORONARY ANGIOGRAM;  Surgeon: Moises Valencia MD;  Location: [facility] CV    CV LEFT HEART CATH N/A 02/08/2024    Procedure: Left Heart Catheterization;  Surgeon: Moises Valencia MD;  Location: [facility]    CV RIGHT HEART CATH MEASUREMENTS RECORDED N/A 02/08/2024    Procedure: Right Heart Catheterization;  Surgeon: Moises Valencia MD;  Location: [facility]    CV TRANSCATHETER AORTIC VALVE REPLACEMENT-FEMORAL APPROACH N/A 02/20/2024    Procedure: Transcatheter Aortic Valve Replacement, possible cardiopulmonary bypass, possible surgical intervention;  Surgeon: Moises Valencia MD;  Location: [facility] CV    EP PACEMAKER DEVICE & IMPLANT- HIS LEAD DUAL N/A 3/7/2024    Procedure: Pacemaker Device & Lead Implant - HIS Lead Dual;  Surgeon: Donnell Leon MD;  Location: [facility] CV    ESOPHAGOSCOPY, GASTROSCOPY, DUODENOSCOPY (EGD), COMBINED N/A 11/06/2018    Procedure: ESOPHAGOGASTRODUODENOSCOPY;  Surgeon: Lit Fernando MD;  Location: [facility] [place];  Service:     HYSTERECTOMY      JOINT REPLACEMENT Left     TKA    OR TRANSCATHETER AORTIC VALVE  "REPLACEMENT, FEMORAL PERCUTANEOUS APPROACH (STANDBY) N/A 02/20/2024    Procedure: OR TRANSCATHETER AORTIC VALVE REPLACEMENT, FEMORAL PERCUTANEOUS APPROACH (STANDBY);  Surgeon: Ishmael Farias MD;  Location: [facility] CATH LAB CV    PICC TRIPLE LUMEN PLACEMENT  01/12/2023         OK ABLATE HEART DYSRHYTHM FOCUS      Description: Catheter Ablation Atrial Fibrillation;  Recorded: 07/31/2012;  Comments: 7/24/12 PVI with Dr. Gardiner and nilay to all 5 pulm veins and CTI fl ablation line as well.    TRANSCATHETER AORTIC-VALVE REPLACEMENT      ZZC SUPRACERV ABD HYSTERECTOMY      Description: Supracervical Hysterectomy;  Proc Date: 01/01/1985;  Comments: some cervix left!; ovaries intact; done for bleeding       Prior to Admission Medications   Prior to Admission Medications   Prescriptions Last Dose Informant Patient Reported? Taking?   ACCU-CHEK SOFTCLIX LANCETS lancets   No No   Sig: [ACCU-CHEK SOFTCLIX LANCETS LANCETS] TEST THREE TIMES DAILY   BD ULTRA-FINE SHORT PEN NEEDLE 31 gauge x 5/16\" Ndle   No No   Sig: [BD ULTRA-FINE SHORT PEN NEEDLE 31 GAUGE X 5/16\" NDLE] TEST FOUR TIMES DAILY WITH MEALS AND AT BEDTIME   Emollient (EUCERIN ORIGINAL HEALING) LOTN PRN  Yes Yes   Sig: APPLY LIBERALLY TO DRY SKIN TWICE DAILY AS NEEDED   Nutritional Supplements (ENSURE COMPLETE) LIQD   No No   Sig: Take 1 Can by mouth daily   albuterol (PROAIR HFA/PROVENTIL HFA/VENTOLIN HFA) 108 (90 Base) MCG/ACT inhaler PRN  No Yes   Sig: INHALE 2 PUFFS INTO THE LUNGS EVERY 4 HOURS AS NEEDED FOR WHEEZING   apixaban ANTICOAGULANT (ELIQUIS) 5 MG tablet 8/10/2024 at am  No Yes   Sig: Take 1 tablet (5 mg) by mouth 2 times daily   atorvastatin (LIPITOR) 20 MG tablet 8/9/2024 at pm  No Yes   Sig: TAKE 1 TABLET(20 MG) BY MOUTH AT BEDTIME   blood glucose (ONETOUCH VERIO IQ) test strip   No No   Sig: TEST THREE TIMES DAILY   blood-glucose meter (ONETOUCH VERIO IQ METER) Misc   No No   Sig: [BLOOD-GLUCOSE METER (ONETOUCH VERIO IQ METER) MISC] Check " blood sugar three times a day.   budesonide-formoterol (SYMBICORT) 160-4.5 MCG/ACT Inhaler 8/10/2024 at am  No Yes   Sig: INHALE 2 PUFFS INTO THE LUNGS TWICE DAILY   diaper,brief,adult,disposable (ADULT BRIEFS - LARGE) Misc   No No   Sig: [DIAPER,BRIEF,ADULT,DISPOSABLE (ADULT BRIEFS - LARGE) MISC] Use 3-4 daily as needed for incontinence   diltiazem ER COATED BEADS (CARDIZEM CD/CARTIA XT) 120 MG 24 hr capsule 8/10/2024 at am  No Yes   Sig: TAKE 1 CAPSULE(120 MG) BY MOUTH DAILY   empagliflozin (JARDIANCE) 10 MG TABS tablet 8/10/2024 at am  No Yes   Sig: Take 1 tablet (10 mg) by mouth daily   furosemide (LASIX) 20 MG tablet 8/10/2024 at am  No Yes   Sig: Take 2 tablets (40 mg) by mouth daily   gabapentin (NEURONTIN) 600 MG tablet 8/10/2024 at am  No Yes   Sig: TAKE 1 TABLET(600 MG) BY MOUTH THREE TIMES DAILY   generic lancets (FINGERSTIX LANCETS)   No No   Sig: [GENERIC LANCETS (FINGERSTIX LANCETS)] Dispense brand per patient's insurance at pharmacy discretion.   ipratropium - albuterol 0.5 mg/2.5 mg/3 mL (DUONEB) 0.5-2.5 (3) MG/3ML neb solution PRN  No Yes   Sig: INHALE 1 VIAL VIA NEBULIZER EVERY 6 HOURS AS NEEDED FOR SHORTNESS OF BREATH OR WHEEZING   metoprolol tartrate (LOPRESSOR) 50 MG tablet 8/10/2024 at am  No Yes   Sig: Take 1 tablet (50 mg) by mouth 2 times daily   naloxone (NARCAN) 4 MG/0.1ML nasal spray   No No   Sig: Spray 1 spray (4 mg) into one nostril alternating nostrils once as needed for opioid reversal every 2-3 minutes until assistance arrives   nystatin (NYSTOP) 247246 UNIT/GM external powder PRN  No Yes   Sig: APPLY TO THE AFFECTED AREA(S) 2-3 TIMES DAILY AS NEEDED   omeprazole (PRILOSEC) 40 MG DR capsule 8/10/2024 at am  Yes Yes   Sig: Take 40 mg by mouth daily   oxyCODONE IR (ROXICODONE) 10 MG tablet PRN  No Yes   Sig: Take 1 tablet (10 mg) by mouth every 6 hours as needed for moderate to severe pain   polyethylene glycol (MIRALAX) 17 GM/Dose powder PRN  No Yes   Sig: Take 17 g by mouth daily as  "needed for constipation   potassium chloride john ER (KLOR-CON M20) 20 MEQ CR tablet 8/10/2024 at am  No Yes   Sig: TAKE 1 TABLET(20 MEQ) BY MOUTH DAILY   sennosides (SENOKOT) 8.6 MG tablet 8/10/2024 at am  No Yes   Sig: Take 1 tablet by mouth 2 times daily   sucralfate (CARAFATE) 1 GM tablet 8/10/2024 at am  No Yes   Sig: CRUSH ONE TABLET AND MIX WITH A LLITTLE WATER AND SWALLOW FOUR TIMES DAILY      Facility-Administered Medications: None         Review of Systems    The 10 point Review of Systems is negative other than noted in the HPI or here.      Social History   I have reviewed this patient's social history and updated it with pertinent information if needed.  Social History     Tobacco Use    Smoking status: Some Days     Current packs/day: 0.25     Types: Cigarettes     Passive exposure: Never    Smokeless tobacco: Never    Tobacco comments:     seen by TTS inpatient on 3/31/22   Vaping Use    Vaping status: Never Used   Substance Use Topics    Alcohol use: Yes     Comment: Alcoholic Drinks/day: very little    Drug use: No       Allergies   Allergies   Allergen Reactions    Celebrex [Celecoxib] Rash     patient had butterfly rash - \"lupus-like\"      Latex Rash        Physical Exam   Vital Signs: Temp: 96.8  F (36  C) Temp src: Temporal BP: (!) 88/55 Pulse: 79   Resp: 12 SpO2: 93 % O2 Device: Oxymask Oxygen Delivery: 6 LPM In the room, the MAP was 69 during my evalation  Weight: 140 lbs 0 oz     GENERAL:  Alert, appears comfortable, in no acute distress, appears stated age   HEAD:  Normocephalic, without obvious abnormality, atraumatic   EYES:  PERRL, conjunctiva/corneas clear, no scleral icterus, EOM's intact   NOSE: Nares normal, septum midline, mucosa normal, no drainage   THROAT: Lips, mucosa, and tongue normal; teeth and gums normal, mouth moist   NECK: Supple, symmetrical, trachea midline   BACK:   Symmetric, no curvature   LUNGS:   On BiPAP, increased respiratory effort speaking, coarse breath sounds " throughout, chest tube appears stable, decreased breath sounds at the bases with some rales   CHEST WALL:  No tenderness or conspicuous deformity   HEART:  Regular rate and rhythm, S1 and S2 normal, no murmur, rub, or gallop, no conspicuous jugular venous distention noted, peripheral pulses intact    ABDOMEN:   Soft, non-tender, bowel sounds auscultated in all four quadrants, no masses, no organomegaly, no rebound or guarding   EXTREMITIES: Extremities normal, atraumatic, no cyanosis or edema    SKIN: Dry to touch, no exanthems in the visualized areas or erythema   NEURO: Alert, oriented x3, moves all four extremities of their own volition, strength is 5/5 in 4 limbs    PSYCH: Cooperative and behavior is appropriate        Medical Decision Making       81 MINUTES SPENT BY ME on the date of service doing chart review, history, exam, documentation & further activities per the note.      Data   ------------------------- PAST 24 HR DATA REVIEWED -----------------------------------------------    I have personally reviewed the following data over the past 24 hrs:    13.1 (H)  \   11.4 (L)   / 255     137 100 23.5 (H) /  222 (H)   5.3 27 0.93 \     ALT: 99 (H) AST: 186 (H) AP: 239 (H) TBILI: 1.2   ALB: 3.1 (L) TOT PROTEIN: 7.0; 7.0 LIPASE: N/A     Trop: 28 (H) BNP: 8,987 (H)     TSH: 2.80 T4: N/A A1C: N/A     Procal: 0.17 CRP: N/A Lactic Acid: 1.7       Ferritin:  N/A % Retic:  N/A LDH:  701 (H)       Imaging results reviewed over the past 24 hrs:   Recent Results (from the past 24 hour(s))   XR Chest Port 1 View    Narrative    EXAM: XR CHEST PORT 1 VIEW  LOCATION: [facility]  DATE: 8/10/2024    INDICATION: sob, hypoxic, history of prior ptx, eval for ptx, pna or edema  COMPARISON: 7/17/2020      Impression    IMPRESSION: Large right pleural effusion. No pneumothorax. Left subclavian approach pacing device. Prosthetic aortic valve. Cardiac silhouette within normal limits. No acute osseous  abnormality.   XR Chest Port 1 View    Narrative    EXAM: XR CHEST PORTABLE 1 VIEW  LOCATION: [facility]  DATE: 08/10/2024    INDICATION: Status post right pigtail chest tube.  COMPARISON: 08/10/2024 at 1010 hours.      Impression    IMPRESSION: No change in dual-lead cardiac pacer or TAVR. Right-sided chest tube has been placed since comparison study with decrease in the previously seen large right pleural effusion. A moderate to large pleural effusion remains. There is likely   overlying compressive atelectasis of the inferior portion of the right lung with concurrent infectious process not excluded. There is some indistinctness of the pulmonary interstitium involving the left lower lobe which is new from prior study and may   reflect airway inflammation or edema.     CT Chest w Contrast    Narrative    EXAM: CT CHEST W CONTRAST  LOCATION: [facility]  DATE: 8/10/2024    INDICATION: SOB, large right-sided pleural effusion status post right pigtail  COMPARISON: None.  TECHNIQUE: CT chest with IV contrast. Multiplanar reformats were obtained. Dose reduction techniques were used.    CONTRAST: 90 mL [id number]    FINDINGS:   LUNGS AND PLEURA: Large right effusion. Pigtail catheter is at the anterior right apex without significant fluid surrounding it. Extensive compressive atelectasis throughout the right lung. Left lung clear.    MEDIASTINUM/AXILLAE: No lymphadenopathy. No thoracic aneurysm.    CORONARY ARTERY CALCIFICATION: Moderate.    UPPER ABDOMEN: No acute findings.    MUSCULOSKELETAL: No frankly destructive bony lesions.      Impression    IMPRESSION:   1.  Large right effusion and extensive compressive atelectasis versus less likely infiltrate in the right lung.  2.  The pigtail catheter tip is at the anterior apex, most of the fluid is at the base.

## 2024-08-10 NOTE — ED TRIAGE NOTES
The patient presents to the ED via EMS from home with c/o worsening shortness of breath since last night. She is on chronic O2 for COPD at 3L/min per NC and had to increase to 4L Duoneb started en route. Patient had recent pacemaker placed and valve replacement per EMS report.     Triage Assessment (Adult)       Row Name 08/10/24 0942          Triage Assessment    Airway WDL WDL        Respiratory WDL    Respiratory WDL X        Skin Circulation/Temperature WDL    Skin Circulation/Temperature WDL WDL        Cardiac WDL    Cardiac WDL X        Peripheral/Neurovascular WDL    Peripheral Neurovascular WDL WDL        Cognitive/Neuro/Behavioral WDL    Cognitive/Neuro/Behavioral WDL WDL

## 2024-08-10 NOTE — CONSULTS
PULMONARY / CRITICAL CARE CONSULT NOTE    Date / Time of Admission:  8/10/2024  9:18 AM    Assessment:     Mary Kay Tejada is a 74 year old female with history of HTN, CAD, HFpEF, afib on DOAC, aortic stenosis s/p TAVR, s/p pacemaker, COPD, tobacco user.   Recent admission 7/12 to 7/17 with acute hypercarbic respiratory failure, pneumonia and COVI19 viral infection. CT scan showed RUL collapse, right effusion. Sputum Cx (+) E coli. Developed pneumothorax post right side thoracentesis. Improvement of pneumothorax in serial CXR. Discharged home on O2 supplementation 3 LPM, and Abx and follow up in clinic.   Presents to ED on 8/10/2024 for evaluation of shortness of breath.   Patient was in moderate respiratory distress, hypotensive, hypoxic.   O2 supplementation NRB mask.   Labs showed elevated LFTs, elevated WBC, negative procalcitonin.   CXR showed large right pleural effusion.   ED physician placed a right side chest tube. Follow up CXR showed mild decrease right side effusion, tip of chest tube towards right apex. Had a follow up chest CT scan, study showed RUL atelectasis, debris in right lower lobe bronchus, moderate right effusion. Pulmonary service consulted.    Acute on chronic respiratory failure   Large right pleural effusion   Recent admission with E coli pneumonia and right pleural effusion. Pleural fluids 7/15/2024 showed lymphocytic predominant, transudate effusion, negative cytology and cultures.   Patient underwent chest tube placement 8/10. Follow up x ray showed tip towards right apex, adhesion right upper lobe to chest wall. Pleural fluid studies are pending.   Plan for IR evaluation and reposition of chest tube.   Volume up status  Pneumonia   CT scan showed debris in airway. Chronic collapse right upper lobe. Patient reports dysphagia.   Pulmonary toiletting.   Speech therapy evaluation  Broad Abx. Induce sputum Cx.   Consideration for diagnostic bronchoscopy.   COPD exacerbation  HTN, CAD,  HFpEF, aortic stenosis s/p TAVR, s/p pacemaker  A fib anticoagulated  Tobacco user    Advance Directives:  Full code    Plan:   Titrate FiO2 to keep SpO2 > 90%, currently on 3 LPM  Schedule bronchodilators  Add saline nebs   Systemic steroids   Induce sputum Cx.   Broad Abx zosyn and vancomycin  Consideration for diagnostic bronchoscopy   Chest tube connected to suction   IR consult for reposition of chest tube.   Titrate BP meds, IV diuresis   Follow up BNP level   Monitor kidney function   Supplement electrolytes as needed  Change diet to dysphagia diet  Speech therapy evaluation   Glucose level monitoring   DVT prophylaxis , on apixaban , will hold it until reposition of chest tube    Please contact me if you have any questions.    Modesto Joseph  Pulmonary / Critical Care  08/10/2024  11:35 AM        Reason for consult:  right pleural effusion    HPI:  Mary Kay Tejada is a 74 year old female with history of HTN, CAD, HFpEF, afib on DOAC, aortic stenosis s/p TAVR, s/p pacemaker, COPD, tobacco user.   Recent admission 7/12 to 7/17 with acute hypercarbic respiratory failure, pneumonia and COVI19 viral infection. CT scan showed RUL collapse, right effusion. Sputum Cx (+) E coli. Developed pneumothorax post right side thoracentesis. Improvement of pneumothorax in serial CXR. Discharged home on O2 supplementation 3 LPM, and Abx and follow up in clinic.   Presents to ED on 8/10/2024 for evaluation of shortness of breath.   Patient was in moderate respiratory distress, hypotensive, hypoxic.   O2 supplementation NRB mask.   Labs showed elevated LFTs, elevated WBC, negative procalcitonin.   CXR showed large right pleural effusion.   ED physician placed a right side chest tube. Follow up CXR showed mild decrease right side effusion, tip of chest tube towards right apex. Had a follow up chest CT scan, study showed RUL atelectasis, debris in right lower lobe bronchus, moderate right effusion. Pulmonary service  consulted.    Past Medical History:   Diagnosis Date    Anemia     Aortic stenosis     Aortic valve disorder     Atrial fibrillation (H)     Atrial flutter (H)     Benign neoplasm of adenomatous polyp     large intestine     Chronic constipation     Chronic heart failure with preserved ejection fraction (H) 02/29/2024    Chronic pain syndrome     Congestive heart failure (H)     COPD (chronic obstructive pulmonary disease) (H)     Oxygen at night     Dependence on supplemental oxygen     Oxygen at noc, during the day as needed    Depression     Diabetes mellitus (H)     Dry eye syndrome     Fibromyalgia     Ganglion     left wrist    GERD (gastroesophageal reflux disease)     Hyperlipidemia     Hypertension     Hypokalemia     Infective otitis externa, unspecified     Created by Conversion     Larynx edema     Lung disease     Malignant neoplasm of vulva (H)     Created by Conversion [facility] Annotation: Apr 17 2007  8:24AM - Cammy Bui:  resection per Dr. Alfonso Mane 9/06;  Replacement Utility updated for latest IMO load    Medial epicondylitis     Onychomycosis     Osteoarthritis     Peptic ulcer     Polyneuropathy     Vulvar malignant neoplasm (H)      Allergies: Celebrex [celecoxib] and Latex     MEDS:  Prior to Admission medications    Medication Sig Last Dose Taking? Auth Provider Long Term End Date   ACCU-CHEK SOFTCLIX LANCETS lancets [ACCU-CHEK SOFTCLIX LANCETS LANCETS] TEST THREE TIMES DAILY   Cammy Bui MD     albuterol (PROAIR HFA/PROVENTIL HFA/VENTOLIN HFA) 108 (90 Base) MCG/ACT inhaler INHALE 2 PUFFS INTO THE LUNGS EVERY 4 HOURS AS NEEDED FOR WHEEZING   Cammy Bui MD Yes    apixaban ANTICOAGULANT (ELIQUIS) 5 MG tablet Take 1 tablet (5 mg) by mouth 2 times daily   Margy Rea PA-C     atorvastatin (LIPITOR) 20 MG tablet TAKE 1 TABLET(20 MG) BY MOUTH AT BEDTIME   Cammy Bui MD Yes    BD ULTRA-FINE SHORT PEN NEEDLE 31  "gauge x 5/16\" Ndle [BD ULTRA-FINE SHORT PEN NEEDLE 31 GAUGE X 5/16\" NDLE] TEST FOUR TIMES DAILY WITH MEALS AND AT BEDTIME   Cammy Bui MD     blood glucose (ONETOUCH VERIO IQ) test strip TEST THREE TIMES DAILY   Cammy Bui MD     blood-glucose meter (ONETOUCH VERIO IQ METER) Misc [BLOOD-GLUCOSE METER (ONETOUCH VERIO IQ METER) MISC] Check blood sugar three times a day.   Cammy Bui MD     budesonide-formoterol (SYMBICORT) 160-4.5 MCG/ACT Inhaler INHALE 2 PUFFS INTO THE LUNGS TWICE DAILY   Cammy Bui MD Yes    carbamide peroxide (DEBROX) 6.5 % otic solution Place 5 drops into both ears 2 times daily   Cammy Bui MD     diaper,brief,adult,disposable (ADULT BRIEFS - LARGE) Misc [DIAPER,BRIEF,ADULT,DISPOSABLE (ADULT BRIEFS - LARGE) MISC] Use 3-4 daily as needed for incontinence   Cammy Bui MD     diltiazem ER COATED BEADS (CARDIZEM CD/CARTIA XT) 120 MG 24 hr capsule TAKE 1 CAPSULE(120 MG) BY MOUTH DAILY   Cammy Bui MD Yes    Emollient (EUCERIN ORIGINAL HEALING) LOTN APPLY LIBERALLY TO DRY SKIN TWICE DAILY AS NEEDED   Reported, Patient     empagliflozin (JARDIANCE) 10 MG TABS tablet Take 1 tablet (10 mg) by mouth daily   Cammy Bui MD     furosemide (LASIX) 20 MG tablet Take 2 tablets (40 mg) by mouth daily   Alexandria Lynn APRN CNP Yes    gabapentin (NEURONTIN) 600 MG tablet TAKE 1 TABLET(600 MG) BY MOUTH THREE TIMES DAILY   Cammy Bui MD Yes    generic lancets (FINGERSTIX LANCETS) [GENERIC LANCETS (FINGERSTIX LANCETS)] Dispense brand per patient's insurance at pharmacy discretion.   Cammy Bui MD     ipratropium - albuterol 0.5 mg/2.5 mg/3 mL (DUONEB) 0.5-2.5 (3) MG/3ML neb solution INHALE 1 VIAL VIA NEBULIZER EVERY 6 HOURS AS NEEDED FOR SHORTNESS OF BREATH OR WHEEZING   Cammy Bui MD Yes "    meclizine (ANTIVERT) 25 MG tablet TAKE 1 TABLET(25 MG) BY MOUTH THREE TIMES DAILY AS NEEDED FOR DIZZINESS OR NAUSEA   Cammy Bui MD     metoprolol tartrate (LOPRESSOR) 50 MG tablet Take 1 tablet (50 mg) by mouth 2 times daily   Surendra Pizano MD Yes    naloxone (NARCAN) 4 MG/0.1ML nasal spray Spray 1 spray (4 mg) into one nostril alternating nostrils once as needed for opioid reversal every 2-3 minutes until assistance arrives   Cammy Bui MD Yes    Nutritional Supplements (ENSURE COMPLETE) LIQD Take 1 Can by mouth daily   Cammy Bui MD Yes    nystatin (NYSTOP) 565052 UNIT/GM external powder APPLY TO THE AFFECTED AREA(S) 2-3 TIMES DAILY AS NEEDED   Cammy Bui MD     omeprazole (PRILOSEC) 20 MG DR capsule TAKE 1 CAPSULE(20 MG) BY MOUTH TWICE DAILY   Cammy Bui MD     oxyCODONE IR (ROXICODONE) 10 MG tablet Take 1 tablet (10 mg) by mouth every 6 hours as needed for moderate to severe pain   Cammy Bui MD     polyethylene glycol (MIRALAX) 17 GM/Dose powder Take 17 g by mouth daily as needed for constipation   Johnny Arellano MD     potassium chloride john ER (KLOR-CON M20) 20 MEQ CR tablet TAKE 1 TABLET(20 MEQ) BY MOUTH DAILY   Cammy Bui MD     sennosides (SENOKOT) 8.6 MG tablet Take 1 tablet by mouth 2 times daily   Johnny Arellano MD     sucralfate (CARAFATE) 1 GM tablet CRUSH ONE TABLET AND MIX WITH A LLITTLE WATER AND SWALLOW FOUR TIMES DAILY   Cammy Bui MD       Current Facility-Administered Medications   Medication Dose Route Frequency Provider Last Rate Last Admin    acetaminophen (TYLENOL) tablet 650 mg  650 mg Oral Q4H PRN Rayo Mendoza MD   650 mg at 08/11/24 0407    Or    acetaminophen (TYLENOL) Suppository 650 mg  650 mg Rectal Q4H PRN Rayo Mendoza MD        [Held by provider] albuterol (PROVENTIL HFA/VENTOLIN HFA) inhaler  2 puff  Inhalation Q4H PRN Rayo Mendzoa MD        amiodarone (NEXTERONE) 1.8 mg/mL in dextrose 5% 200 mL ADULT STANDARD infusion  1 mg/min Intravenous Continuous Modesto Londono MD        amiodarone (NEXTERONE) 1.8 mg/mL in dextrose 5% 200 mL ADULT STANDARD infusion  0.5 mg/min Intravenous Continuous Modesto Londono MD        amiodarone (NEXTERONE) bolus 150 mg  150 mg Intravenous Once Modesto Londono MD   150 mg at 08/11/24 1025    apixaban ANTICOAGULANT (ELIQUIS) tablet 5 mg  5 mg Oral BID Modesto Londono MD        artificial saliva (BIOTENE MT) solution 1 spray  1 spray Mouth/Throat 4x Daily Raoy Mendoza MD        atorvastatin (LIPITOR) tablet 20 mg  20 mg Oral At Bedtime Rayo Mendoza MD   20 mg at 08/10/24 2101    benzocaine-menthol (CEPACOL) 15-3.6 MG lozenge 1 lozenge  1 lozenge Buccal Q1H PRN Rayo Mendoza MD        calcium carbonate (TUMS) chewable tablet 1,000 mg  1,000 mg Oral 4x Daily PRN Rayo Mendoza MD        glucose gel 15-30 g  15-30 g Oral Q15 Min PRN Rayo Mendoza MD        Or    dextrose 50 % injection 25-50 mL  25-50 mL Intravenous Q15 Min PRN Rayo Mendoza MD        Or    glucagon injection 1 mg  1 mg Subcutaneous Q15 Min PRN Rayo Mendoza MD        [Held by provider] diltiazem ER COATED BEADS (CARDIZEM CD/CARTIA XT) 24 hr capsule 120 mg  120 mg Oral Daily Modesto Londono MD        diltiazem ER COATED BEADS (CARDIZEM CD/CARTIA XT) 24 hr capsule 120 mg  120 mg Oral Daily Rayo Mendoza MD        flumazenil (ROMAZICON) injection 0.2 mg  0.2 mg Intravenous q1 min prn Modesto Londono MD        fluticasone-vilanterol (BREO ELLIPTA) 200-25 MCG/ACT inhaler 1 puff  1 puff Inhalation Daily Rayo Mendoza MD        [Held by provider] furosemide (LASIX) injection 20 mg  20 mg Intravenous Q8H Rayo Mendoza MD   20 mg at 08/10/24 1345    gabapentin (NEURONTIN) tablet 600 mg  600 mg Oral TID Rayo Mendoza MD   600 mg at  08/10/24 2036    insulin aspart (NovoLOG) injection (RAPID ACTING)  1-3 Units Subcutaneous TID AC Rayo Mendoza MD   1 Units at 08/11/24 0917    insulin aspart (NovoLOG) injection (RAPID ACTING)  1-3 Units Subcutaneous At Bedtime Rayo Mendoza MD   1 Units at 08/10/24 2101    ipratropium - albuterol 0.5 mg/2.5 mg/3 mL (DUONEB) neb solution 3 mL  3 mL Nebulization 4x daily Rayo Mendoza MD   3 mL at 08/10/24 1900    lidocaine (LMX4) cream   Topical Q1H PRN Rayo Mendoza MD        lidocaine 1 % 0.1-1 mL  0.1-1 mL Other Q1H PRN Rayo Mendoza MD        lidocaine 1 % 0.1-5 mL  0.1-5 mL Other Q1H PRN Rayo Mendoza MD   1 mL at 08/10/24 1615    melatonin tablet 1 mg  1 mg Oral At Bedtime PRN Rayo Mendoza MD        methylPREDNISolone sodium succinate (SOLU-MEDROL) injection 40 mg  40 mg Intravenous Q12H Modesto Londono MD   40 mg at 08/11/24 0442    [Held by provider] metoprolol tartrate (LOPRESSOR) tablet 50 mg  50 mg Oral BID Modesto Londono MD        metoprolol tartrate (LOPRESSOR) tablet 50 mg  50 mg Oral BID Rayo Mendoza MD        miconazole (MICATIN) 2 % powder   Topical TID PRN Rayo Mendoza MD        naloxone (NARCAN) injection 0.2 mg  0.2 mg Intravenous Q2 Min PRN Modesto Londono MD        naloxone (NARCAN) injection 0.2 mg  0.2 mg Intramuscular Q2 Min PRN Modesto Londono MD        naloxone (NARCAN) injection 0.4 mg  0.4 mg Intravenous Q2 Min PRN Modesto Londono MD        naloxone (NARCAN) injection 0.4 mg  0.4 mg Intramuscular Q2 Min PRN Modesto Londono MD        norepinephrine (LEVOPHED) 4 mg in  mL infusion PREMIX  0.01-0.6 mcg/kg/min Intravenous Continuous Modesto Londono MD 11.9 mL/hr at 08/11/24 0445 0.05 mcg/kg/min at 08/11/24 0445    nystatin (MYCOSTATIN) suspension 500,000 Units  500,000 Units Oral 4x Daily Modesto Londono MD        ondansetron (ZOFRAN ODT) ODT tab 4 mg  4 mg  Oral Q6H PRN Modesto Londono MD        Or    ondansetron (ZOFRAN) injection 4 mg  4 mg Intravenous Q6H PRN Modesto Londono MD        pantoprazole (PROTONIX) 2 mg/mL suspension 40 mg  40 mg Oral QAM AC Rayo Mendoza MD        piperacillin-tazobactam (ZOSYN) 3.375 g vial to attach to  mL bag  3.375 g Intravenous Q8H Rayo Mendoza MD   3.375 g at 08/11/24 0131    polyethylene glycol (MIRALAX) Packet 17 g  17 g Oral Daily PRN Rayo Mendoza MD        prochlorperazine (COMPAZINE) injection 5 mg  5 mg Intravenous Q6H PRN Rayo Mendoza MD        Or    prochlorperazine (COMPAZINE) tablet 5 mg  5 mg Oral Q6H PRN Rayo Mendoza MD        Or    prochlorperazine (COMPAZINE) suppository 12.5 mg  12.5 mg Rectal Q12H PRN Rayo Mendoza MD        senna-docusate (SENOKOT-S/PERICOLACE) 8.6-50 MG per tablet 1 tablet  1 tablet Oral BID PRN Rayo Mendoza MD        Or    senna-docusate (SENOKOT-S/PERICOLACE) 8.6-50 MG per tablet 2 tablet  2 tablet Oral BID PRN Rayo Mendoza MD        sennosides (SENOKOT) tablet 1 tablet  1 tablet Oral BID Rayo Mendoza MD   1 tablet at 08/10/24 2036    sodium chloride (NEBUSAL) 3 % neb solution 3 mL  3 mL Nebulization BID Rayo Mendoza MD   3 mL at 08/10/24 1900    sodium chloride (PF) 0.9% PF flush 10-20 mL  10-20 mL Intracatheter q1 min prn Keyanna Osullivan RN        sodium chloride (PF) 0.9% PF flush 10-40 mL  10-40 mL Intracatheter Q7 Days Keyanna Osullivan RN        sodium chloride (PF) 0.9% PF flush 10-40 mL  10-40 mL Intracatheter Q1H PRN Osullivan, Keyanna A, RN        sodium chloride (PF) 0.9% PF flush 3 mL  3 mL Intracatheter Q8H Rayo Mendoza MD   3 mL at 08/11/24 0131    sodium chloride (PF) 0.9% PF flush 3 mL  3 mL Intracatheter q1 min prn Rayo Mendoza MD        sucralfate (CARAFATE) tablet 1 g  1 g Oral TID AC Rayo Mendoza MD   1 g at 08/10/24 1831    vancomycin (VANCOCIN) 1,500 mg in 0.9% NaCl 250 mL intermittent infusion  1,500 mg Intravenous Q24H  Rayo Mendoza MD        vasopressin (VASOSTRICT) 20 Units in sodium chloride 0.9 % 100 mL standard conc infusion  2.4 Units/hr Intravenous Continuous Jorje Montejo MD   Paused at 08/10/24 2040     Family History   Problem Relation Age of Onset    Heart Failure Mother     Cancer Other         paternal HX-laryngeal     Alcoholism Sister     No Known Problems Daughter     No Known Problems Maternal Grandmother     No Known Problems Maternal Grandfather     No Known Problems Paternal Grandmother     No Known Problems Paternal Grandfather     No Known Problems Maternal Aunt     No Known Problems Paternal Aunt     Alcoholism Sister     Alcoholism Brother     Alcoholism Father     Cancer Paternal Uncle         Gastric-Alcohol    Cancer Paternal Uncle         gastric-Alcohol    Hereditary Breast and Ovarian Cancer Syndrome No family hx of     Breast Cancer No family hx of     Colon Cancer No family hx of     Endometrial Cancer No family hx of     Ovarian Cancer No family hx of      Social History     Socioeconomic History    Marital status:      Spouse name: Not on file    Number of children: Not on file    Years of education: Not on file    Highest education level: Not on file   Occupational History    Not on file   Tobacco Use    Smoking status: Some Days     Current packs/day: 0.25     Types: Cigarettes     Passive exposure: Never    Smokeless tobacco: Never    Tobacco comments:     seen by TTS inpatient on 3/31/22   Vaping Use    Vaping status: Never Used   Substance and Sexual Activity    Alcohol use: Yes     Comment: Alcoholic Drinks/day: very little    Drug use: No    Sexual activity: Not on file   Other Topics Concern    Not on file   Social History Narrative    Not on file     Social Determinants of Health     Financial Resource Strain: Low Risk  (12/26/2023)    Financial Resource Strain     Within the past 12 months, have you or your family members you live with been unable to get utilities  "(heat, electricity) when it was really needed?: No   Food Insecurity: Low Risk  (12/26/2023)    Food Insecurity     Within the past 12 months, did you worry that your food would run out before you got money to buy more?: No     Within the past 12 months, did the food you bought just not last and you didn t have money to get more?: No   Transportation Needs: Low Risk  (12/26/2023)    Transportation Needs     Within the past 12 months, has lack of transportation kept you from medical appointments, getting your medicines, non-medical meetings or appointments, work, or from getting things that you need?: No   Physical Activity: Not on file   Stress: Not on file   Social Connections: Not on file   Interpersonal Safety: Low Risk  (4/1/2024)    Interpersonal Safety     Do you feel physically and emotionally safe where you currently live?: Yes     Within the past 12 months, have you been hit, slapped, kicked or otherwise physically hurt by someone?: No     Within the past 12 months, have you been humiliated or emotionally abused in other ways by your partner or ex-partner?: No   Housing Stability: Low Risk  (12/26/2023)    Housing Stability     Do you have housing? : Yes     Are you worried about losing your housing?: No     ROS  - Twelve point review of systems were discussed with patient, positive finding in HPI        Objective:   VITALS:  BP 91/70   Pulse (!) 127   Temp 96.8  F (36  C) (Temporal)   Resp 15   Ht 1.651 m (5' 5\")   Wt 63.5 kg (140 lb)   LMP  (LMP Unknown)   SpO2 96%   BMI 23.30 kg/m    VENT:  Resp: 15    EXAM:   Gen: lethargic, arousable, moderate respiratory distress  HEENT: pale conjunctiva, dry mucosa, Mallampati II/IV  Neck: no thyromegaly, masses or JVD  Lungs: decrease breath sounds right base, discrete wheezes both HT  CV: regular, no murmurs or gallops appreciated  Abdomen: soft, NT, BS wnl  Ext: trace edema  Neuro: lethargic, arousable, non focal       Data Review:  Recent Labs   Lab " 08/10/24  0937 08/10/24  0934   * 220*      08/10/24 09:37   Sodium 137   Potassium 5.3   Chloride 100   Carbon Dioxide (CO2) 27   Urea Nitrogen 23.5 (H)   Creatinine 0.93   GFR Estimate 64   Calcium 9.4   Anion Gap 10   Magnesium 2.7 (H)   Albumin 3.1 (L)   Protein Total 7.0   Alkaline Phosphatase 239 (H)   ALT 99 (H)    (H)   Bilirubin Direct 0.82 (H)   Bilirubin Total 1.2   Glucose 222 (H)      08/10/24 09:37   WBC 13.1 (H)   Hemoglobin 11.4 (L)   Hematocrit 39.6   Platelet Count 255   RBC Count 4.57   MCV 87   MCH 24.9 (L)   MCHC 28.8 (L)   RDW 17.8 (H)   % Neutrophils 73   % Lymphocytes 15   % Monocytes 9   % Eosinophils 1     XR CHEST PORT 1 VIEW  LOCATION: [facility]  DATE: 8/10/2024   INDICATION: sob, hypoxic, history of prior ptx, eval for ptx, pna or edema  COMPARISON: 7/17/2020  IMPRESSION: Large right pleural effusion. No pneumothorax. Left subclavian approach pacing device. Prosthetic aortic valve. Cardiac silhouette within normal limits. No acute osseous abnormality.    By:  Modesto Joseph MD, 08/10/2024  11:35 AM    Primary Care Physician:  Cammy Bui

## 2024-08-10 NOTE — PROCEDURES
"PICC Line Insertion Procedure Note     Pt. Name:   Mary Kay Tejada     MRN:          [id number]     Procedure: Insertion of a  Triple Lumen  5 fr  Bard SOLO (valved) Power PICC, Lot number [id number]     Indications: hypotensive with respiratory failure and ICU admission     Contraindications : pacemaker on left is 5 months since placement, will use right arm     Procedure Details:     Patient identified with 2 identifiers and \"Time Out\" conducted.     Central line insertion bundle followed: hand hygiene performed prior to procedure, site cleansed with Cholraprep (CHG), hat, mask, sterile gloves, sterile gown worn, patient draped with maximum barrier head to toe drape, sterile field maintained.     The vein was assessed and found to be compressible and of adequate size.      Lidocaine 1% one ml administered SQ to the insertion site.      Modified Seldinger Technique (MST) used for insertion, one attempt(s) required to access vein.      A 5 Fr PICC was inserted into the brachial vein of the right upper arm.        Catheter threaded without difficulty. Good blood return noted.     Catheter was flushed with 10 cc normal saline.      Catheter secured with SecurAcath, Biopatch, and Tegaderm dressing applied.     The sharps that are included in the PICC insertion kit were accounted for and disposed of in the sharps container prior to breakdown of the sterile field.     CLABSI prevention brochure left at bedside.     Patient tolerated procedure well.      Patient's primary RN notified PICC is ready for use.       Findings:     Total catheter length  43 cm, with 2 cm exposed. Mid upper arm circumference is 28 cm.      Tip placement verified in the SVC/RA junction by PCXR.    Comments:  The PICC is properly positioned and ready for use.         Keyanna Osullivan RN BSN  Vascular [facility] - [facility]   "

## 2024-08-10 NOTE — PHARMACY-VANCOMYCIN DOSING SERVICE
Pharmacy Vancomycin Initial Note  Date of Service August 10, 2024  Patient's  1950  74 year old, female    Indication: Sepsis    Current estimated CrCl = Estimated Creatinine Clearance: 53.2 mL/min (based on SCr of 0.93 mg/dL).    Creatinine for last 3 days  8/10/2024:  9:37 AM Creatinine 0.93 mg/dL    Recent Vancomycin Level(s) for last 3 days  No results found for requested labs within last 3 days.      Vancomycin IV Administrations (past 72 hours)        No vancomycin orders with administrations in past 72 hours.                    Nephrotoxins and other renal medications (From now, onward)      Start     Dose/Rate Route Frequency Ordered Stop    08/10/24 1230  piperacillin-tazobactam (ZOSYN) 3.375 g vial to attach to  mL bag         3.375 g  over 30 Minutes Intravenous ONCE 08/10/24 1203              Contrast Orders - past 72 hours (72h ago, onward)      Start     Dose/Rate Route Frequency Stop    08/10/24 1200  iopamidol (ISOVUE-370) solution 100 mL         100 mL Intravenous ONCE 08/10/24 1201                  Plan:  Start vancomycin  1250mg (~20mg/kg) IV once    If continuing during inpatient admission, Please reorder Pharmacy to Dose Vancomycin Consult  Ishmael Werner RP

## 2024-08-10 NOTE — ED PROVIDER NOTES
EMERGENCY DEPARTMENT ENCOUNTER      NAME: Mary Kay Tejada  AGE: 74 year old female  YOB: 1950  MRN: [id number]  EVALUATION DATE & TIME: 8/10/2024  9:18 AM    PCP: Cammy Bui    ED PROVIDER: Thee Brewer MD      Chief Complaint   Patient presents with    Shortness of Breath         FINAL IMPRESSION:  1. Tension hydrothorax    2. Atrial fibrillation with rapid ventricular response (H)    3. Lactic acidosis    4. Acute and chronic respiratory failure with hypercapnia (H)          ED COURSE & MEDICAL DECISION MAKING:    Pertinent Labs & Imaging studies reviewed. (See chart for details)  74 year old female presents to the Emergency Department for evaluation of shortness of breath, hypoxia    ED Course as of 08/10/24 1550   Sat Aug 10, 2024   0935 Patient is a 74-year-old female with history of COVID, CHF, COPD on chronic 3 L nasal cannula, paroxysmal atrial fibrillation on Eliquis as well as a history of SVT and severe aortic stenosis who presents to the emergency department for shortness of breath.  Patient states began feeling short of breath last night and persisted since.  Denies any specific chest pain.  She has had a wet cough with some sputum for the past few days as well.  Denies any specific abdominal pain.  No headache or neck pain.  Denies any new lower extremity edema.  On paramedic arrival they found her saturating in the 70s on her home 3 L which improved with 6 L nasal cannula.    On exam here patient is in mild to moderate distress.  Tachypneic.  Somewhat dry mucous membranes.  No clear wheezes or rails on the left side and does have diffusely diminished breath sounds at the right side.  Significant tachycardia with what sounds like a regular rhythm.  Possible SVT.  2-1 a flutter on the monitor.  Abdomen soft and nontender.  No significant lower extremity edema.    Initial differential includes was not limited to ACS, transient dysrhythmia such as SVT or a flutter,  pneumonia, pneumothorax, significant pleural effusion, COPD exacerbation.    Will perform broad infectious and metabolic workup.  Stat portable chest x-ray to evaluate for significant pneumothorax.   0935 EKG shows narrow complex tachycardic rhythm angulate either supraventricular tachycardia or 2-1 flutter, no ST elevations, T wave inversions in 1, aVL    0948 Performed bedside echocardiogram.  Left-sided ejection fraction appears significant reduced as does RV motility.  IVC is about 1 cm and does have some respiratory variation.  No obvious pericardial effusion.  Does have a large right-sided pleural effusion essentially filling the right hemithorax.     1209 Patient remains borderline hypotensive here in significant respiratory distress.  Did obtain a portable chest x-ray which demonstrates a large   1209 Chest x-ray also demonstrates a large right-sided pleural effusion.    Patient remains somewhat hypotensive here with significant tachycardia increasing O2 requirement as well as signs of respiratory distress.  Concerning large right-sided pleural effusion is causing significant respiratory distress and perhaps even tension hydrothorax.     plan for pigtail chest tube was discussed with the patient and she was consented verbally.   ultrasound was used to confirm a site that had a large pleural effusion the right anterior lateral chest wall just above the inframammary crease, site was marked and cleansed with chlorhexidine.  10 cc of 1% lidocaine with epinephrine was then used for local anesthetic.  18-gauge needle introduced and aspirated clear yellow fluid.  Guidewire threaded without any resistance.  However upon first attempt of threading the pigtail catheter did have some difficulty threading into the intrapleural space and guidewire seems to have kinked I think there was some tunneling  her coiling in the subcutaneous tissue but pigtail did not advance easily.  As wire seems to have kinked this was  withdrawn along with the initial pigtail catheter and a second set up was used.  Once again 16-gauge needle advanced into the intrathoracic cavity and clear yellow fluid was once again aspirated.  Guidewire successfully threaded and on repeat advancement of the pigtail catheter from no significant resistance.  Guidewire, pigtail stylette were withdrawn and there is spontaneous flow of clear yellow fluid from the pigtail catheter.  This was attached to a Pleur-evac and had immediate drainage of clear yellow pleural fluid.  Will send pleural fluid for lab testing.   1215 Post pigtail chest x-ray shows pigtail catheter within the thoracic cavity that is angled apically.  Some improvement of right-sided pleural effusion although significant effusion still remains.    Shortly after the procedure patient did begin to have significant improvement in her shortness of breath, work of breathing as well as her hypoxia and tachycardia.   1216 Labs reviewed and interpreted myself.  Initial lactate is elevated at 3.3 and will cover empirically with broad-spectrum antibiotics given concern for possible pneumonia as well as for leukocytosis.  Additionally VBG obtained part prior to chest tube placement does show significant respiratory acidosis with significant CO2 retention.  Will repeat ABG to see if respiratory acidosis is now improving given drainage of pleural effusion.  If she has persistent respiratory acidosis will initiate BiPAP for added respiratory support.    BMP shows slight prerenal azotemia no other significant electrolyte derangements.  Hepatic function panel shows mild transaminitis and alk phos is slight elevated at 238.  Bilirubin total within normal limits and slight elevation in direct bilirubin.  BNP also notably elevated at 9000 and Trope 28.  Will repeat a delta troponin to evaluate for any significant increase.   1217 Unclear etiology of her pleural effusion at this point but she has had recurrent  thoracenteses required in the past.  Could be a component of heart failure leading to pleural effusion or potentially this could be exudative.  Her BNP is markedly elevated although blood pressure is soft and perhaps A little bit intravascularly dry.  Will trial an aliquot of 500 mL of intravenous crystalloid to see if this helps with her blood pressure.         Repeat VBG after pigtail does show modest improvement in her respiratory acidosis but still persistently CO2 retention.  Therefore we will place her on BiPAP to aid in ventilation and her respiratory acidosis.  Overall her work of breathing is improved significantly and clinically she is improved from her tach presentation.  Her lactic acidosis has resolved.   Her tachycardia has resolved with looks like persistent atrial fibrillation.  Blood pressures remain somewhat soft but she is still maintaining her maps greater than 65.  Diuresis with Lasix started in the emergency department  will continue to monitor her hemodynamics closely.  Additionally she was covered with broad-spectrum antibiotics with vancomycin and Zosyn.    At this point in time with significant proved in her respiratory symptoms and otherwise remaining hemodynamic stable I do believe she is safe for admission to a cardiac telemetry bed.  Discussed with the hospitalist agrees admit the patient at this point in time for ongoing management.      9:22 AM  I met and evaluated the patient.   9:57 AM I updated the patient's family and obtained consent for chest tube.  10:12 AM Radiology tech arrived at bedside. Chest x-ray performed.  10:26 AM I placed chest tube. Patient tolerated procedure well.  11:13 AM I updated patient's family on successful chest tube placement.  11:22 AM I reassessed the patient.  1:43 PM I reassessed the patient and updated patient and family with results.  2:38 PM  I spoke with the intensivist Dr. Julio.     Medical Decision Making  Obtained supplemental  history:Supplemental history obtained?: EMS  Reviewed external records: External records reviewed?: Inpatient Record: [facility] Admission: (01/10/2023 - 01/19/2023)  (05/24/2023 - 06/05/2023)  (07/12/2024 - 07/17/2024)  Care impacted by chronic illness:Chronic Lung Disease, Diabetes, Heart Disease, Hyperlipidemia, and Hypertension  Care significantly affected by social determinants of health:Access to Medical Care  Did you consider but not order tests?: Work up considered but not performed and documented in chart, if applicable  Did you interpret images independently?: Independent interpretation of ECG and images noted in documentation, when applicable.  Consultation discussion with other provider:Did you involve another provider (consultant, , pharmacy, etc.)?: I discussed the care with another health care provider, see documentation for details.  Admit.          At the conclusion of the encounter I discussed the results of all of the tests and the disposition. The questions were answered. The patient or family acknowledged understanding and was agreeable with the care plan.     61 minutes of critical care time     MEDICATIONS GIVEN IN THE EMERGENCY:  Medications   vancomycin (VANCOCIN) 1,250 mg in 0.9% NaCl 250 mL intermittent infusion (1,250 mg Intravenous $New Bag 8/10/24 1426)   diltiazem ER COATED BEADS (CARDIZEM CD/CARTIA XT) 24 hr capsule 120 mg (has no administration in time range)   metoprolol tartrate (LOPRESSOR) tablet 50 mg (has no administration in time range)   piperacillin-tazobactam (ZOSYN) 3.375 g vial to attach to  mL bag (has no administration in time range)   vancomycin (VANCOCIN) 1,250 mg in 0.9% NaCl 250 mL intermittent infusion (has no administration in time range)   furosemide (LASIX) injection 20 mg (20 mg Intravenous $Given 8/10/24 1345)   ipratropium - albuterol 0.5 mg/2.5 mg/3 mL (DUONEB) neb solution 3 mL (3 mLs Nebulization $Given 8/10/24 1416)   sodium chloride  (NEBUSAL) 3 % neb solution 3 mL (3 mLs Nebulization $Given 8/10/24 1416)   ipratropium - albuterol 0.5 mg/2.5 mg/3 mL (DUONEB) 0.5-2.5 (3) MG/3ML neb solution (  Canceled Entry 8/10/24 1301)   pantoprazole (PROTONIX) 2 mg/mL suspension 40 mg (has no administration in time range)   lidocaine 1 % 0.1-5 mL (has no administration in time range)   lidocaine (LMX4) cream (has no administration in time range)   sodium chloride (PF) 0.9% PF flush 10-40 mL (has no administration in time range)   glucose gel 15-30 g (has no administration in time range)     Or   dextrose 50 % injection 25-50 mL (has no administration in time range)     Or   glucagon injection 1 mg (has no administration in time range)   insulin aspart (NovoLOG) injection (RAPID ACTING) (has no administration in time range)   insulin aspart (NovoLOG) injection (RAPID ACTING) (has no administration in time range)   sodium chloride 0.9% BOLUS 500 mL (0 mLs Intravenous Stopped 8/10/24 1331)   iopamidol (ISOVUE-370) solution 100 mL (90 mLs Intravenous $Given 8/10/24 1201)   piperacillin-tazobactam (ZOSYN) 3.375 g vial to attach to  mL bag (0 g Intravenous Stopped 8/10/24 1427)   furosemide (LASIX) injection 40 mg (40 mg Intravenous $Given 8/10/24 1345)   acetaminophen (TYLENOL) tablet 650 mg (650 mg Oral $Given 8/10/24 1533)       NEW PRESCRIPTIONS STARTED AT TODAY'S ER VISIT  New Prescriptions    No medications on file          =================================================================    HPI    Patient information was obtained from: patient and EMS.    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    Per EMS, patient comes from home for shortness of breath. She has history of COPD, pacemaker,  and valve replacement. She is usually on 3L of supplemental O2 at baseline. Heart rate was in the 150s. She was given a duo neb en route.     Patient reports sudden onset persistent shortness of breath starting last night. Due to shortness of breath she increased her O2 to 4L. She also reports a productive cough that started today. She denies lower extremity edema, fever, or pain anywhere else. She denies falls, or any other complaints at this time.    Chart review:  -01/10/2023 - 01/19/2023  [facility]  Admitted on 1/10 with acute hypoxic respiratory failure requiring BiPAP, initially with unclear inciting event.  Noted to have a moderate right pleural effusion and underwent therapeutic thoracentesis on 1/11 with 1 L transitive fluid removed.  -  05/24/2023 - 06/ 05/2023  [facility]  For the patient's COPD exacerbation is receiving steroids, duonebs, and doxycycline.  Pleural effusion was noted.  Underwent ultrasound-guided thoracentesis.  Findings consistent with exudative effusion.   -07/12/2024 - 07/17/2024  [facility]:  The patient was admitted into the hospital with acute chronic hypoxic and hypercarbic respiratory failure with COVID and concomitant bacterial pneumonia. She had associated right upper lobe collapse and underwent thoracentesis for a pleural effusion, which has been recurrent. Her procedure complicated by pneumothorax, which improved by discharge and the patient was able to be at the level of her home oxygen. She was also prescribed cefdinir (OMNICEF) 300mg capsule (take 1 capsule by mouth 2 times daily for 4 days), polyethylene glycol (MIRALAX) 17 gm/dose powder (take 17 g by mouth daily as needed for constipation), and sennosides (SENOKOT) 8.6 mg tablet (take 1 tablet by mouth 2 times daily).       REVIEW OF SYSTEMS   Refer to the HPI    PAST MEDICAL HISTORY:  Past Medical History:   Diagnosis Date    Anemia      Aortic stenosis     Aortic valve disorder     Atrial fibrillation (H)     Atrial flutter (H)     Benign neoplasm of adenomatous polyp     large intestine     Chronic constipation     Chronic heart failure with preserved ejection fraction (H) 02/29/2024    Chronic pain syndrome     Congestive heart failure (H)     COPD (chronic obstructive pulmonary disease) (H)     Oxygen at night     Dependence on supplemental oxygen     Oxygen at noc, during the day as needed    Depression     Diabetes mellitus (H)     Dry eye syndrome     Fibromyalgia     Ganglion     left wrist    GERD (gastroesophageal reflux disease)     Hyperlipidemia     Hypertension     Hypokalemia     Infective otitis externa, unspecified     Created by Conversion     Larynx edema     Lung disease     Malignant neoplasm of vulva (H)     Created by Conversion [facility] Annotation: Apr 17 2007  8:24AM - Cammy Bui:  resection per Dr. Alfonso Mane 9/06;  Replacement Utility updated for latest IMO load    Medial epicondylitis     Onychomycosis     Osteoarthritis     Peptic ulcer     Polyneuropathy     Vulvar malignant neoplasm (H)        PAST SURGICAL HISTORY:  Past Surgical History:   Procedure Laterality Date    BIOPSY BREAST Right     BIOPSY BREAST Right 01/28/2015    BIOPSY BREAST Right 01/28/2015    Procedure: RIGHT BREAST BIOPSY AFTER WIRE LOCALIZATION AT 0940;  Surgeon: Renée Soriano MD;  Location: [facility];  Service:     BIOPSY OF BREAST, INCISIONAL      Description: Incisional Breast Biopsy;  Recorded: 11/13/2007;  Comments: benign    COLONOSCOPY N/A 06/14/2019    Procedure: COLONOSCOPY;  Surgeon: Eduardo Mora MD;  Location: [facility];  Service: Gastroenterology    CV CORONARY ANGIOGRAM N/A 02/08/2024    Procedure: CV CORONARY ANGIOGRAM;  Surgeon: Moises Valencia MD;  Location: [facility] CATH LAB CV    CV LEFT HEART CATH N/A 02/08/2024    Procedure: Left Heart Catheterization;  Surgeon: Moises Valencia  MD;  Location: [facility] CV    CV RIGHT HEART CATH MEASUREMENTS RECORDED N/A 02/08/2024    Procedure: Right Heart Catheterization;  Surgeon: Moises Valencia MD;  Location: [facility] CV    CV TRANSCATHETER AORTIC VALVE REPLACEMENT-FEMORAL APPROACH N/A 02/20/2024    Procedure: Transcatheter Aortic Valve Replacement, possible cardiopulmonary bypass, possible surgical intervention;  Surgeon: Moises Valencia MD;  Location: [facility] CV    EP PACEMAKER DEVICE & IMPLANT- HIS LEAD DUAL N/A 3/7/2024    Procedure: Pacemaker Device & Lead Implant - HIS Lead Dual;  Surgeon: Donnell Leon MD;  Location: [facility] CV    ESOPHAGOSCOPY, GASTROSCOPY, DUODENOSCOPY (EGD), COMBINED N/A 11/06/2018    Procedure: ESOPHAGOGASTRODUODENOSCOPY;  Surgeon: Lit Fernando MD;  Location: [facility];  Service:     HYSTERECTOMY      JOINT REPLACEMENT Left     TKA    OR TRANSCATHETER AORTIC VALVE REPLACEMENT, FEMORAL PERCUTANEOUS APPROACH (STANDBY) N/A 02/20/2024    Procedure: OR TRANSCATHETER AORTIC VALVE REPLACEMENT, FEMORAL PERCUTANEOUS APPROACH (STANDBY);  Surgeon: Ishmael Farias MD;  Location: [facility] CV    PICC TRIPLE LUMEN PLACEMENT  01/12/2023         MA ABLATE HEART DYSRHYTHM FOCUS      Description: Catheter Ablation Atrial Fibrillation;  Recorded: 07/31/2012;  Comments: 7/24/12 PVI with Dr. Gardiner and nilay to all 5 pulm veins and CTI fl ablation line as well.    TRANSCATHETER AORTIC-VALVE REPLACEMENT      ZZC SUPRACERV ABD HYSTERECTOMY      Description: Supracervical Hysterectomy;  Proc Date: 01/01/1985;  Comments: some cervix left!; ovaries intact; done for bleeding           CURRENT MEDICATIONS:    albuterol (PROAIR HFA/PROVENTIL HFA/VENTOLIN HFA) 108 (90 Base) MCG/ACT inhaler  apixaban ANTICOAGULANT (ELIQUIS) 5 MG tablet  atorvastatin (LIPITOR) 20 MG tablet  budesonide-formoterol (SYMBICORT) 160-4.5 MCG/ACT Inhaler  diltiazem ER COATED BEADS (CARDIZEM  "CD/CARTIA XT) 120 MG 24 hr capsule  Emollient (EUCERIN ORIGINAL HEALING) LOTN  empagliflozin (JARDIANCE) 10 MG TABS tablet  furosemide (LASIX) 20 MG tablet  gabapentin (NEURONTIN) 600 MG tablet  ipratropium - albuterol 0.5 mg/2.5 mg/3 mL (DUONEB) 0.5-2.5 (3) MG/3ML neb solution  metoprolol tartrate (LOPRESSOR) 50 MG tablet  nystatin (NYSTOP) 251058 UNIT/GM external powder  omeprazole (PRILOSEC) 40 MG DR capsule  oxyCODONE IR (ROXICODONE) 10 MG tablet  polyethylene glycol (MIRALAX) 17 GM/Dose powder  potassium chloride john ER (KLOR-CON M20) 20 MEQ CR tablet  sennosides (SENOKOT) 8.6 MG tablet  sucralfate (CARAFATE) 1 GM tablet  ACCU-CHEK SOFTCLIX LANCETS lancets  BD ULTRA-FINE SHORT PEN NEEDLE 31 gauge x 5/16\" Ndle  blood glucose (ONETOUCH VERIO IQ) test strip  blood-glucose meter (ONETOUCH VERIO IQ METER) Misc  diaper,brief,adult,disposable (ADULT BRIEFS - LARGE) Misc  generic lancets (FINGERSTIX LANCETS)  naloxone (NARCAN) 4 MG/0.1ML nasal spray  Nutritional Supplements (ENSURE COMPLETE) LIQD        ALLERGIES:  Allergies   Allergen Reactions    Celebrex [Celecoxib] Rash     patient had butterfly rash - \"lupus-like\"      Latex Rash       FAMILY HISTORY:  Family History   Problem Relation Age of Onset    Heart Failure Mother     Cancer Other         paternal HX-laryngeal     Alcoholism Sister     No Known Problems Daughter     No Known Problems Maternal Grandmother     No Known Problems Maternal Grandfather     No Known Problems Paternal Grandmother     No Known Problems Paternal Grandfather     No Known Problems Maternal Aunt     No Known Problems Paternal Aunt     Alcoholism Sister     Alcoholism Brother     Alcoholism Father     Cancer Paternal Uncle         Gastric-Alcohol    Cancer Paternal Uncle         gastric-Alcohol    Hereditary Breast and Ovarian Cancer Syndrome No family hx of     Breast Cancer No family hx of     Colon Cancer No family hx of     Endometrial Cancer No family hx of     Ovarian Cancer No " "family hx of        SOCIAL HISTORY:   Social History     Socioeconomic History    Marital status:    Tobacco Use    Smoking status: Some Days     Current packs/day: 0.25     Types: Cigarettes     Passive exposure: Never    Smokeless tobacco: Never    Tobacco comments:     seen by TTS inpatient on 3/31/22   Vaping Use    Vaping status: Never Used   Substance and Sexual Activity    Alcohol use: Yes     Comment: Alcoholic Drinks/day: very little    Drug use: No     Social Determinants of Health     Financial Resource Strain: Low Risk  (12/26/2023)    Financial Resource Strain     Within the past 12 months, have you or your family members you live with been unable to get utilities (heat, electricity) when it was really needed?: No   Food Insecurity: Low Risk  (12/26/2023)    Food Insecurity     Within the past 12 months, did you worry that your food would run out before you got money to buy more?: No     Within the past 12 months, did the food you bought just not last and you didn t have money to get more?: No   Transportation Needs: Low Risk  (12/26/2023)    Transportation Needs     Within the past 12 months, has lack of transportation kept you from medical appointments, getting your medicines, non-medical meetings or appointments, work, or from getting things that you need?: No   Interpersonal Safety: Low Risk  (4/1/2024)    Interpersonal Safety     Do you feel physically and emotionally safe where you currently live?: Yes     Within the past 12 months, have you been hit, slapped, kicked or otherwise physically hurt by someone?: No     Within the past 12 months, have you been humiliated or emotionally abused in other ways by your partner or ex-partner?: No   Housing Stability: Low Risk  (12/26/2023)    Housing Stability     Do you have housing? : Yes     Are you worried about losing your housing?: No       VITALS:  BP 97/55   Pulse 97   Temp 96.8  F (36  C) (Temporal)   Resp 19   Ht 1.651 m (5' 5\")   Wt " 63.5 kg (140 lb)   LMP  (LMP Unknown)   SpO2 92%   BMI 23.30 kg/m      PHYSICAL EXAM    Constitutional: Well developed, Well nourished, NAD,  HENT: Normocephalic, Atraumatic,  mucous membranes moist,   Neck- trachea midline, No stridor.    Eyes:EOMI, Conjunctiva normal, No discharge.   Respiratory: diminished breath sounds in entire right chest, no wheezing or rales on left side, tachypneic, on 6L of nasal canula oxygen  Cardiovascular: Normal heart rate, Regular rhythm,  No murmurs,   Abdominal: Soft, No tenderness, No rebound or guarding.     Musculoskeletal: no deformity or malalignment, no significant lower extremity edema   Integument: Warm, Dry, No erythema,    Neurologic: Alert & oriented x 3   Psychiatric: Affect normal, Cooperative.      LAB:  All pertinent labs reviewed and interpreted.  Results for orders placed or performed during the hospital encounter of 08/10/24   XR Chest Port 1 View    Impression    IMPRESSION: Large right pleural effusion. No pneumothorax. Left subclavian approach pacing device. Prosthetic aortic valve. Cardiac silhouette within normal limits. No acute osseous abnormality.   XR Chest Port 1 View    Impression    IMPRESSION: No change in dual-lead cardiac pacer or TAVR. Right-sided chest tube has been placed since comparison study with decrease in the previously seen large right pleural effusion. A moderate to large pleural effusion remains. There is likely   overlying compressive atelectasis of the inferior portion of the right lung with concurrent infectious process not excluded. There is some indistinctness of the pulmonary interstitium involving the left lower lobe which is new from prior study and may   reflect airway inflammation or edema.     CT Chest w Contrast    Impression    IMPRESSION:   1.  Large right effusion and extensive compressive atelectasis versus less likely infiltrate in the right lung.  2.  The pigtail catheter tip is at the anterior apex, most of the  fluid is at the base.               Basic metabolic panel   Result Value Ref Range    Sodium 137 135 - 145 mmol/L    Potassium 5.3 3.4 - 5.3 mmol/L    Chloride 100 98 - 107 mmol/L    Carbon Dioxide (CO2) 27 22 - 29 mmol/L    Anion Gap 10 7 - 15 mmol/L    Urea Nitrogen 23.5 (H) 8.0 - 23.0 mg/dL    Creatinine 0.93 0.51 - 0.95 mg/dL    GFR Estimate 64 >60 mL/min/1.73m2    Calcium 9.4 8.8 - 10.4 mg/dL    Glucose 222 (H) 70 - 99 mg/dL   Lactic acid whole blood with 1x repeat in 2 hr when >2   Result Value Ref Range    Lactic Acid, Initial 3.3 (H) 0.7 - 2.0 mmol/L   Result Value Ref Range    Procalcitonin 0.17 <0.50 ng/mL   Result Value Ref Range    Troponin T, High Sensitivity 28 (H) <=14 ng/L   Result Value Ref Range    Magnesium 2.7 (H) 1.7 - 2.3 mg/dL   UA with Microscopic reflex to Culture    Specimen: Urine, Catheter   Result Value Ref Range    Color Urine Light Yellow Colorless, Straw, Light Yellow, Yellow    Appearance Urine Clear Clear    Glucose Urine >1000 (A) Negative mg/dL    Bilirubin Urine Negative Negative    Ketones Urine Negative Negative mg/dL    Specific Gravity Urine 1.024 1.001 - 1.030    Blood Urine Negative Negative    pH Urine 5.0 5.0 - 7.0    Protein Albumin Urine Negative Negative mg/dL    Urobilinogen Urine <2.0 <2.0 mg/dL    Nitrite Urine Negative Negative    Leukocyte Esterase Urine Negative Negative    Mucus Urine Present (A) None Seen /LPF    RBC Urine 1 <=2 /HPF    WBC Urine 1 <=5 /HPF    Squamous Epithelials Urine <1 <=1 /HPF    Hyaline Casts Urine 7 (H) <=2 /LPF   Hepatic function panel   Result Value Ref Range    Protein Total 7.0 6.4 - 8.3 g/dL    Albumin 3.1 (L) 3.5 - 5.2 g/dL    Bilirubin Total 1.2 <=1.2 mg/dL    Alkaline Phosphatase 239 (H) 40 - 150 U/L     (H) 0 - 45 U/L    ALT 99 (H) 0 - 50 U/L    Bilirubin Direct 0.82 (H) 0.00 - 0.30 mg/dL   Nt probnp inpatient   Result Value Ref Range    N terminal Pro BNP Inpatient 8,987 (H) 0 - 900 pg/mL   TSH with free T4 reflex    Result Value Ref Range    TSH 2.80 0.30 - 4.20 uIU/mL   CBC with platelets and differential   Result Value Ref Range    WBC Count 13.1 (H) 4.0 - 11.0 10e3/uL    RBC Count 4.57 3.80 - 5.20 10e6/uL    Hemoglobin 11.4 (L) 11.7 - 15.7 g/dL    Hematocrit 39.6 35.0 - 47.0 %    MCV 87 78 - 100 fL    MCH 24.9 (L) 26.5 - 33.0 pg    MCHC 28.8 (L) 31.5 - 36.5 g/dL    RDW 17.8 (H) 10.0 - 15.0 %    Platelet Count 255 150 - 450 10e3/uL    % Neutrophils 73 %    % Lymphocytes 15 %    % Monocytes 9 %    % Eosinophils 1 %    % Basophils 1 %    % Immature Granulocytes 2 %    NRBCs per 100 WBC 1 (H) <1 /100    Absolute Neutrophils 9.5 (H) 1.6 - 8.3 10e3/uL    Absolute Lymphocytes 2.0 0.8 - 5.3 10e3/uL    Absolute Monocytes 1.2 0.0 - 1.3 10e3/uL    Absolute Eosinophils 0.1 0.0 - 0.7 10e3/uL    Absolute Basophils 0.1 0.0 - 0.2 10e3/uL    Absolute Immature Granulocytes 0.3 <=0.4 10e3/uL    Absolute NRBCs 0.1 10e3/uL   Extra Red Top Tube   Result Value Ref Range    Hold Specimen JIC    Extra Blue Top Tube   Result Value Ref Range    Hold Specimen JIC    Glucose by meter   Result Value Ref Range    GLUCOSE BY METER POCT 220 (H) 70 - 99 mg/dL   Lactic acid whole blood   Result Value Ref Range    Lactic Acid 1.7 0.7 - 2.0 mmol/L   Blood gas venous   Result Value Ref Range    pH Venous 7.12 (LL) 7.32 - 7.43    pCO2 Venous 88 (HH) 40 - 50 mm Hg    pO2 Venous 30 25 - 47 mm Hg    Bicarbonate Venous 28 21 - 28 mmol/L    Base Excess/Deficit Venous -1.1 -3.0 - 3.0 mmol/L    FIO2 36     Oxyhemoglobin Venous 31 (L) 70 - 75 %    O2 Sat, Venous 31.8 (L) 70.0 - 75.0 %   Glucose fluid   Result Value Ref Range    Glucose Fluid Source Pleural Cavity, Right     Glucose fluid 159 mg/dL   Lactate dehydrogenase fluid   Result Value Ref Range    LD Fluid Source Pleural Cavity, Right     Lactate dehydrogenase fluid 268 U/L   Result Value Ref Range    Protein Total 7.0 6.4 - 8.3 g/dL   Result Value Ref Range    Lactate Dehydrogenase 701 (H) 0 - 250 U/L   Blood  gas venous   Result Value Ref Range    pH Venous 7.21 (L) 7.32 - 7.43    pCO2 Venous 78 (HH) 40 - 50 mm Hg    pO2 Venous 27 25 - 47 mm Hg    Bicarbonate Venous 31 (H) 21 - 28 mmol/L    Base Excess/Deficit Venous 3.6 (H) -3.0 - 3.0 mmol/L    FIO2 49     Oxyhemoglobin Venous 30 (L) 70 - 75 %    O2 Sat, Venous 30.3 (L) 70.0 - 75.0 %   Cell Count Body Fluid   Result Value Ref Range    Color Yellow Colorless, Yellow    Clarity Clear Clear    Cell Count Fluid Source Pleural Cavity, Right     Total Nucleated Cells 1,451 /uL   Differential Body Fluid   Result Value Ref Range    % Neutrophils 73 %    % Lymphocytes 22 %    % Monocyte/Macrophages 5 %   ECG 12-LEAD WITH MUSE ([facility])   Result Value Ref Range    Systolic Blood Pressure 88 mmHg    Diastolic Blood Pressure 61 mmHg    Ventricular Rate 155 BPM    Atrial Rate 133 BPM    CT Interval  ms    QRS Duration 110 ms     ms    QTc 543 ms    P Axis  degrees    R AXIS -63 degrees    T Axis 102 degrees    Interpretation ECG       Supraventricular tachycardia  Left axis deviation  Incomplete left bundle branch block  T wave abnormality, consider lateral ischemia  Abnormal ECG  When compared with ECG of 12-Jul-2024 19:36,  Vent. rate has increased by  86 bpm  T wave inversion now evident in Lateral leads  Confirmed by SEE ED PROVIDER NOTE FOR, ECG INTERPRETATION ([id number]),  YUMIKO VYAS ([id number]) on 8/10/2024 3:12:28 PM         RADIOLOGY:  Reviewed all pertinent imaging. Please see official radiology report.  CT Chest w Contrast   Final Result   IMPRESSION:    1.  Large right effusion and extensive compressive atelectasis versus less likely infiltrate in the right lung.   2.  The pigtail catheter tip is at the anterior apex, most of the fluid is at the base.                      XR Chest Port 1 View   Final Result   IMPRESSION: No change in dual-lead cardiac pacer or TAVR. Right-sided chest tube has been placed since comparison study with decrease in the previously seen large  right pleural effusion. A moderate to large pleural effusion remains. There is likely    overlying compressive atelectasis of the inferior portion of the right lung with concurrent infectious process not excluded. There is some indistinctness of the pulmonary interstitium involving the left lower lobe which is new from prior study and may    reflect airway inflammation or edema.         XR Chest Port 1 View   Final Result   IMPRESSION: Large right pleural effusion. No pneumothorax. Left subclavian approach pacing device. Prosthetic aortic valve. Cardiac silhouette within normal limits. No acute osseous abnormality.      POC US ECHO LIMITED    (Results Pending)       EKG:    Performed at: 928    Impression: EKG shows narrow complex tachycardic rhythm angulate either supraventricular tachycardia or 2-1 flutter, no ST elevations, T wave inversions in 1, aVL         I have independently reviewed and interpreted the EKG(s) documented above.    PROCEDURES:   PROCEDURE:    Emergency Department Limited Bedside Screening Cardiac Ultrasound   INDICATIONS: shortness of breath and hypotension   PROCEDURE PROVIDER: Thee Brewer    WINDOW AND FINDINGS:    SUB-XYPHOID     :      Cardiac activity: Hypokinetic  Pericardial effusion: No clinically significant pericardial effusion  Signs of Tamponade physiology: Absent, IVC about 1 cm with some respiratory variation   PARASTERNAL    :  Cardiac activity: Hypokinetic  Pericardial effusion: No clinically significant pericardial effusion  Signs of Tamponade physiology: Absent     IMAGES SAVED AND STORED FOR ARCHIVE AND REVIEW: Yes        PROCEDURE: Emergency Department Limited Bedside Screening Ultrasound of the Thorax   INDICATIONS: hypotension   PROCEDURE PROVIDER:   Thee Brewer   WINDOW AND FINDINGS: Right chest wall:  Large right-sided pleural effusion  LeftNormal (good lung sliding)   INTERPRETATION: Right-sided pleural effusion, no signs of pneumothorax on the left   IMAGES PRINTED &  SCANNED OR SAVED TO MEMORY: Yes             PROCEDURE: Tube Thoracostomy   TYPE: Pigtail catheter via Salinger technique   INDICATIONS: It is medically necessary to place a chest tube for 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   TIME OUT: Universal protocol was followed. TIME OUT conducted just prior to starting procedure confirmed patient identity, site/side, procedure, patient position, and availability of correct equipment. Yes   SITE: Right anterolateral chest   MEDICATIONS 10 mLs of 1% Lidocaine with epinephrine        NOTE: Patient was placed in a semirecumbent position with the head of the bed at 30 degrees.  The right prepped with chlorhexidine. Patient was medicated as above. Incision was made laterally in the midaxilary line.  Blunt dissection up and over the rib was preformed until access was obtained to the pleural cavity.  A  14' Niuean pigtail chest tube was placed and connected to Pleurovac on continuous suction. Tube was sutured in place with 2-0 silk, and all connections banded.    There was clear serous colored pleural fluid    Post procedure X-ray showing partial reexpansion of the lung.     COMPLICATIONS: Patient tolerated procedure well, without complication         I, Johann Weber, am serving as a scribe to document services personally performed by Thee Brewer MD based on my observation and the provider's statements to me. I, Thee Brewer MD, attest that Johann Weber is acting in a scribe capacity, has observed my performance of the services and has documented them in accordance with my direction.    Thee Brewer MD  [facility] EMERGENCY ROOM  [address] [facility] [address] [id number] [phone]      Thee Brewer MD  08/10/24 [id number]

## 2024-08-11 ENCOUNTER — APPOINTMENT (OUTPATIENT)
Dept: RADIOLOGY | Facility: CLINIC | Age: 74
DRG: 871 | End: 2024-08-11
Attending: INTERNAL MEDICINE
Payer: COMMERCIAL

## 2024-08-11 LAB
ANION GAP SERPL CALCULATED.3IONS-SCNC: 6 MMOL/L (ref 7–15)
APPEARANCE FLD: ABNORMAL
BACTERIA SPEC CULT: ABNORMAL
BACTERIA SPEC CULT: NORMAL
BUN SERPL-MCNC: 23.1 MG/DL (ref 8–23)
CALCIUM SERPL-MCNC: 8.4 MG/DL (ref 8.8–10.4)
CELL COUNT BODY FLUID SOURCE: ABNORMAL
CHLORIDE SERPL-SCNC: 105 MMOL/L (ref 98–107)
COLOR FLD: COLORLESS
CREAT SERPL-MCNC: 0.75 MG/DL (ref 0.51–0.95)
EGFRCR SERPLBLD CKD-EPI 2021: 83 ML/MIN/1.73M2
ERYTHROCYTE [DISTWIDTH] IN BLOOD BY AUTOMATED COUNT: 17.2 % (ref 10–15)
FLUAV RNA SPEC QL NAA+PROBE: NEGATIVE
FLUBV RNA RESP QL NAA+PROBE: NEGATIVE
GLUCOSE BLDC GLUCOMTR-MCNC: 144 MG/DL (ref 70–99)
GLUCOSE BLDC GLUCOMTR-MCNC: 171 MG/DL (ref 70–99)
GLUCOSE BLDC GLUCOMTR-MCNC: 191 MG/DL (ref 70–99)
GLUCOSE BLDC GLUCOMTR-MCNC: 236 MG/DL (ref 70–99)
GLUCOSE BLDC GLUCOMTR-MCNC: 316 MG/DL (ref 70–99)
GLUCOSE SERPL-MCNC: 184 MG/DL (ref 70–99)
GRAM STAIN RESULT: ABNORMAL
GRAM STAIN RESULT: ABNORMAL
GRAM STAIN RESULT: NORMAL
GRAM STAIN RESULT: NORMAL
HCO3 SERPL-SCNC: 32 MMOL/L (ref 22–29)
HCT VFR BLD AUTO: 31.3 % (ref 35–47)
HGB BLD-MCNC: 9.3 G/DL (ref 11.7–15.7)
KOH PREPARATION: ABNORMAL
KOH PREPARATION: ABNORMAL
KOH PREPARATION: NORMAL
KOH PREPARATION: NORMAL
MAGNESIUM SERPL-MCNC: 2.2 MG/DL (ref 1.7–2.3)
MCH RBC QN AUTO: 24.7 PG (ref 26.5–33)
MCHC RBC AUTO-ENTMCNC: 29.7 G/DL (ref 31.5–36.5)
MCV RBC AUTO: 83 FL (ref 78–100)
PLATELET # BLD AUTO: 180 10E3/UL (ref 150–450)
POTASSIUM SERPL-SCNC: 3.3 MMOL/L (ref 3.4–5.3)
POTASSIUM SERPL-SCNC: 4.4 MMOL/L (ref 3.4–5.3)
RBC # BLD AUTO: 3.77 10E6/UL (ref 3.8–5.2)
RSV RNA SPEC NAA+PROBE: NEGATIVE
SARS-COV-2 RNA RESP QL NAA+PROBE: NEGATIVE
SODIUM SERPL-SCNC: 143 MMOL/L (ref 135–145)
WBC # BLD AUTO: 10.8 10E3/UL (ref 4–11)
WBC # FLD AUTO: 22 /UL

## 2024-08-11 PROCEDURE — 99255 IP/OBS CONSLTJ NEW/EST HI 80: CPT | Performed by: INTERNAL MEDICINE

## 2024-08-11 PROCEDURE — 250N000009 HC RX 250: Performed by: STUDENT IN AN ORGANIZED HEALTH CARE EDUCATION/TRAINING PROGRAM

## 2024-08-11 PROCEDURE — 250N000009 HC RX 250: Performed by: INTERNAL MEDICINE

## 2024-08-11 PROCEDURE — 31624 DX BRONCHOSCOPE/LAVAGE: CPT

## 2024-08-11 PROCEDURE — 87102 FUNGUS ISOLATION CULTURE: CPT | Performed by: INTERNAL MEDICINE

## 2024-08-11 PROCEDURE — 87106 FUNGI IDENTIFICATION YEAST: CPT | Performed by: INTERNAL MEDICINE

## 2024-08-11 PROCEDURE — 31624 DX BRONCHOSCOPE/LAVAGE: CPT | Performed by: INTERNAL MEDICINE

## 2024-08-11 PROCEDURE — 99233 SBSQ HOSP IP/OBS HIGH 50: CPT | Mod: 25 | Performed by: INTERNAL MEDICINE

## 2024-08-11 PROCEDURE — 32551 INSERTION OF CHEST TUBE: CPT | Performed by: INTERNAL MEDICINE

## 2024-08-11 PROCEDURE — 0B948ZX DRAINAGE OF RIGHT UPPER LOBE BRONCHUS, VIA NATURAL OR ARTIFICIAL OPENING ENDOSCOPIC, DIAGNOSTIC: ICD-10-PCS | Performed by: INTERNAL MEDICINE

## 2024-08-11 PROCEDURE — 87205 SMEAR GRAM STAIN: CPT | Performed by: INTERNAL MEDICINE

## 2024-08-11 PROCEDURE — 94640 AIRWAY INHALATION TREATMENT: CPT

## 2024-08-11 PROCEDURE — 94640 AIRWAY INHALATION TREATMENT: CPT | Mod: 76

## 2024-08-11 PROCEDURE — 87205 SMEAR GRAM STAIN: CPT | Performed by: STUDENT IN AN ORGANIZED HEALTH CARE EDUCATION/TRAINING PROGRAM

## 2024-08-11 PROCEDURE — 87798 DETECT AGENT NOS DNA AMP: CPT | Performed by: INTERNAL MEDICINE

## 2024-08-11 PROCEDURE — 89050 BODY FLUID CELL COUNT: CPT | Performed by: INTERNAL MEDICINE

## 2024-08-11 PROCEDURE — 250N000011 HC RX IP 250 OP 636: Performed by: INTERNAL MEDICINE

## 2024-08-11 PROCEDURE — 87206 SMEAR FLUORESCENT/ACID STAI: CPT | Performed by: INTERNAL MEDICINE

## 2024-08-11 PROCEDURE — 87210 SMEAR WET MOUNT SALINE/INK: CPT | Performed by: INTERNAL MEDICINE

## 2024-08-11 PROCEDURE — 999N000287 HC ICU ADULT ROUNDING, EACH 10 MINS

## 2024-08-11 PROCEDURE — 250N000011 HC RX IP 250 OP 636: Performed by: STUDENT IN AN ORGANIZED HEALTH CARE EDUCATION/TRAINING PROGRAM

## 2024-08-11 PROCEDURE — 87102 FUNGUS ISOLATION CULTURE: CPT | Performed by: STUDENT IN AN ORGANIZED HEALTH CARE EDUCATION/TRAINING PROGRAM

## 2024-08-11 PROCEDURE — 87637 SARSCOV2&INF A&B&RSV AMP PRB: CPT | Performed by: STUDENT IN AN ORGANIZED HEALTH CARE EDUCATION/TRAINING PROGRAM

## 2024-08-11 PROCEDURE — 99233 SBSQ HOSP IP/OBS HIGH 50: CPT | Performed by: STUDENT IN AN ORGANIZED HEALTH CARE EDUCATION/TRAINING PROGRAM

## 2024-08-11 PROCEDURE — 99152 MOD SED SAME PHYS/QHP 5/>YRS: CPT

## 2024-08-11 PROCEDURE — 87106 FUNGI IDENTIFICATION YEAST: CPT | Performed by: STUDENT IN AN ORGANIZED HEALTH CARE EDUCATION/TRAINING PROGRAM

## 2024-08-11 PROCEDURE — 87081 CULTURE SCREEN ONLY: CPT | Performed by: INTERNAL MEDICINE

## 2024-08-11 PROCEDURE — 250N000013 HC RX MED GY IP 250 OP 250 PS 637: Performed by: INTERNAL MEDICINE

## 2024-08-11 PROCEDURE — 83735 ASSAY OF MAGNESIUM: CPT | Performed by: INTERNAL MEDICINE

## 2024-08-11 PROCEDURE — 84132 ASSAY OF SERUM POTASSIUM: CPT | Performed by: INTERNAL MEDICINE

## 2024-08-11 PROCEDURE — 87070 CULTURE OTHR SPECIMN AEROBIC: CPT | Performed by: INTERNAL MEDICINE

## 2024-08-11 PROCEDURE — 200N000001 HC R&B ICU

## 2024-08-11 PROCEDURE — 0B968ZX DRAINAGE OF RIGHT LOWER LOBE BRONCHUS, VIA NATURAL OR ARTIFICIAL OPENING ENDOSCOPIC, DIAGNOSTIC: ICD-10-PCS | Performed by: INTERNAL MEDICINE

## 2024-08-11 PROCEDURE — 2894A VOIDCORRECT: CPT | Performed by: STUDENT IN AN ORGANIZED HEALTH CARE EDUCATION/TRAINING PROGRAM

## 2024-08-11 PROCEDURE — 71045 X-RAY EXAM CHEST 1 VIEW: CPT

## 2024-08-11 PROCEDURE — 272N000220 HC BRONCHOSCOPE, DISPOSABLE

## 2024-08-11 PROCEDURE — 258N000003 HC RX IP 258 OP 636: Performed by: STUDENT IN AN ORGANIZED HEALTH CARE EDUCATION/TRAINING PROGRAM

## 2024-08-11 PROCEDURE — 250N000012 HC RX MED GY IP 250 OP 636 PS 637: Performed by: STUDENT IN AN ORGANIZED HEALTH CARE EDUCATION/TRAINING PROGRAM

## 2024-08-11 PROCEDURE — 99418 PROLNG IP/OBS E/M EA 15 MIN: CPT | Performed by: STUDENT IN AN ORGANIZED HEALTH CARE EDUCATION/TRAINING PROGRAM

## 2024-08-11 PROCEDURE — 250N000013 HC RX MED GY IP 250 OP 250 PS 637: Performed by: STUDENT IN AN ORGANIZED HEALTH CARE EDUCATION/TRAINING PROGRAM

## 2024-08-11 PROCEDURE — 0W9930Z DRAINAGE OF RIGHT PLEURAL CAVITY WITH DRAINAGE DEVICE, PERCUTANEOUS APPROACH: ICD-10-PCS | Performed by: INTERNAL MEDICINE

## 2024-08-11 PROCEDURE — 85027 COMPLETE CBC AUTOMATED: CPT | Performed by: STUDENT IN AN ORGANIZED HEALTH CARE EDUCATION/TRAINING PROGRAM

## 2024-08-11 PROCEDURE — 87305 ASPERGILLUS AG IA: CPT | Performed by: INTERNAL MEDICINE

## 2024-08-11 PROCEDURE — 80048 BASIC METABOLIC PNL TOTAL CA: CPT | Performed by: STUDENT IN AN ORGANIZED HEALTH CARE EDUCATION/TRAINING PROGRAM

## 2024-08-11 PROCEDURE — 94660 CPAP INITIATION&MGMT: CPT

## 2024-08-11 PROCEDURE — 88305 TISSUE EXAM BY PATHOLOGIST: CPT | Mod: TC | Performed by: INTERNAL MEDICINE

## 2024-08-11 PROCEDURE — 999N000157 HC STATISTIC RCP TIME EA 10 MIN

## 2024-08-11 RX ORDER — FLUMAZENIL 0.1 MG/ML
0.2 INJECTION, SOLUTION INTRAVENOUS
Status: ACTIVE | OUTPATIENT
Start: 2024-08-11 | End: 2024-08-11

## 2024-08-11 RX ORDER — NYSTATIN 100000/ML
500000 SUSPENSION, ORAL (FINAL DOSE FORM) ORAL 4 TIMES DAILY
Status: DISCONTINUED | OUTPATIENT
Start: 2024-08-11 | End: 2024-08-13

## 2024-08-11 RX ORDER — MAGNESIUM SULFATE HEPTAHYDRATE 40 MG/ML
2 INJECTION, SOLUTION INTRAVENOUS ONCE
Status: DISCONTINUED | OUTPATIENT
Start: 2024-08-11 | End: 2024-08-11

## 2024-08-11 RX ORDER — NALOXONE HYDROCHLORIDE 0.4 MG/ML
0.2 INJECTION, SOLUTION INTRAMUSCULAR; INTRAVENOUS; SUBCUTANEOUS
Status: DISCONTINUED | OUTPATIENT
Start: 2024-08-11 | End: 2024-08-17 | Stop reason: HOSPADM

## 2024-08-11 RX ORDER — NALOXONE HYDROCHLORIDE 0.4 MG/ML
0.4 INJECTION, SOLUTION INTRAMUSCULAR; INTRAVENOUS; SUBCUTANEOUS
Status: DISCONTINUED | OUTPATIENT
Start: 2024-08-11 | End: 2024-08-17 | Stop reason: HOSPADM

## 2024-08-11 RX ORDER — LIDOCAINE HYDROCHLORIDE 40 MG/ML
INJECTION, SOLUTION RETROBULBAR PRN
Status: COMPLETED | OUTPATIENT
Start: 2024-08-11 | End: 2024-08-11

## 2024-08-11 RX ORDER — ONDANSETRON 2 MG/ML
INJECTION INTRAMUSCULAR; INTRAVENOUS PRN
Status: COMPLETED | OUTPATIENT
Start: 2024-08-11 | End: 2024-08-11

## 2024-08-11 RX ORDER — FENTANYL CITRATE 50 UG/ML
INJECTION, SOLUTION INTRAMUSCULAR; INTRAVENOUS PRN
Status: COMPLETED | OUTPATIENT
Start: 2024-08-11 | End: 2024-08-11

## 2024-08-11 RX ORDER — POTASSIUM CHLORIDE 29.8 MG/ML
20 INJECTION INTRAVENOUS
Status: COMPLETED | OUTPATIENT
Start: 2024-08-11 | End: 2024-08-11

## 2024-08-11 RX ORDER — ONDANSETRON 2 MG/ML
4 INJECTION INTRAMUSCULAR; INTRAVENOUS EVERY 6 HOURS PRN
Status: DISCONTINUED | OUTPATIENT
Start: 2024-08-11 | End: 2024-08-13

## 2024-08-11 RX ORDER — PHENYLEPHRINE HCL IN 0.9% NACL 50MG/250ML
.1-6 PLASTIC BAG, INJECTION (ML) INTRAVENOUS CONTINUOUS
Status: DISCONTINUED | OUTPATIENT
Start: 2024-08-12 | End: 2024-08-13

## 2024-08-11 RX ORDER — MAGNESIUM SULFATE HEPTAHYDRATE 40 MG/ML
2 INJECTION, SOLUTION INTRAVENOUS ONCE
Status: COMPLETED | OUTPATIENT
Start: 2024-08-11 | End: 2024-08-11

## 2024-08-11 RX ORDER — ONDANSETRON 4 MG/1
4 TABLET, ORALLY DISINTEGRATING ORAL EVERY 6 HOURS PRN
Status: DISCONTINUED | OUTPATIENT
Start: 2024-08-11 | End: 2024-08-13

## 2024-08-11 RX ORDER — VANCOMYCIN HYDROCHLORIDE
1500 EVERY 24 HOURS
Status: DISCONTINUED | OUTPATIENT
Start: 2024-08-11 | End: 2024-08-13

## 2024-08-11 RX ADMIN — APIXABAN 5 MG: 5 TABLET, FILM COATED ORAL at 12:45

## 2024-08-11 RX ADMIN — LIDOCAINE HYDROCHLORIDE 3 ML: 40 INJECTION, SOLUTION RETROBULBAR; TOPICAL at 09:15

## 2024-08-11 RX ADMIN — AMIODARONE HYDROCHLORIDE 150 MG: 1.5 INJECTION, SOLUTION INTRAVENOUS at 10:25

## 2024-08-11 RX ADMIN — METHYLPREDNISOLONE SODIUM SUCCINATE 40 MG: 40 INJECTION, POWDER, FOR SOLUTION INTRAMUSCULAR; INTRAVENOUS at 17:43

## 2024-08-11 RX ADMIN — AMIODARONE HYDROCHLORIDE 1 MG/MIN: 1.8 INJECTION, SOLUTION INTRAVENOUS at 10:31

## 2024-08-11 RX ADMIN — ONDANSETRON 4 MG: 2 INJECTION INTRAMUSCULAR; INTRAVENOUS at 09:23

## 2024-08-11 RX ADMIN — GABAPENTIN 600 MG: 600 TABLET, FILM COATED ORAL at 20:12

## 2024-08-11 RX ADMIN — ACETAMINOPHEN 650 MG: 325 TABLET ORAL at 04:07

## 2024-08-11 RX ADMIN — PIPERACILLIN AND TAZOBACTAM 3.38 G: 3; .375 INJECTION, POWDER, FOR SOLUTION INTRAVENOUS at 01:31

## 2024-08-11 RX ADMIN — NOREPINEPHRINE BITARTRATE 0.06 MCG/KG/MIN: 0.02 INJECTION, SOLUTION INTRAVENOUS at 10:41

## 2024-08-11 RX ADMIN — FENTANYL CITRATE 25 MCG: 50 INJECTION INTRAMUSCULAR; INTRAVENOUS at 09:27

## 2024-08-11 RX ADMIN — LIDOCAINE HYDROCHLORIDE 5 ML: 40 INJECTION, SOLUTION RETROBULBAR; TOPICAL at 09:29

## 2024-08-11 RX ADMIN — ACETAMINOPHEN 650 MG: 325 TABLET ORAL at 19:46

## 2024-08-11 RX ADMIN — AMIODARONE HYDROCHLORIDE 0.5 MG/MIN: 1.8 INJECTION, SOLUTION INTRAVENOUS at 16:11

## 2024-08-11 RX ADMIN — MIDAZOLAM 1 MG: 1 INJECTION INTRAMUSCULAR; INTRAVENOUS at 09:26

## 2024-08-11 RX ADMIN — POTASSIUM CHLORIDE 20 MEQ: 29.8 INJECTION, SOLUTION INTRAVENOUS at 05:07

## 2024-08-11 RX ADMIN — ATORVASTATIN CALCIUM 20 MG: 10 TABLET, FILM COATED ORAL at 21:28

## 2024-08-11 RX ADMIN — POTASSIUM CHLORIDE 20 MEQ: 29.8 INJECTION, SOLUTION INTRAVENOUS at 06:02

## 2024-08-11 RX ADMIN — INSULIN ASPART 1 UNITS: 100 INJECTION, SOLUTION INTRAVENOUS; SUBCUTANEOUS at 09:17

## 2024-08-11 RX ADMIN — NYSTATIN 500000 UNITS: 100000 SUSPENSION ORAL at 11:14

## 2024-08-11 RX ADMIN — METHYLPREDNISOLONE SODIUM SUCCINATE 40 MG: 40 INJECTION, POWDER, FOR SOLUTION INTRAMUSCULAR; INTRAVENOUS at 04:42

## 2024-08-11 RX ADMIN — SENNOSIDES 1 TABLET: 8.6 TABLET, FILM COATED ORAL at 20:12

## 2024-08-11 RX ADMIN — METOPROLOL TARTRATE 50 MG: 50 TABLET, FILM COATED ORAL at 20:12

## 2024-08-11 RX ADMIN — PIPERACILLIN AND TAZOBACTAM 3.38 G: 3; .375 INJECTION, POWDER, FOR SOLUTION INTRAVENOUS at 10:45

## 2024-08-11 RX ADMIN — MAGNESIUM SULFATE HEPTAHYDRATE 2 G: 40 INJECTION, SOLUTION INTRAVENOUS at 20:12

## 2024-08-11 RX ADMIN — PIPERACILLIN AND TAZOBACTAM 3.38 G: 3; .375 INJECTION, POWDER, FOR SOLUTION INTRAVENOUS at 17:43

## 2024-08-11 RX ADMIN — NYSTATIN 500000 UNITS: 100000 SUSPENSION ORAL at 20:12

## 2024-08-11 RX ADMIN — IPRATROPIUM BROMIDE AND ALBUTEROL SULFATE 3 ML: .5; 3 SOLUTION RESPIRATORY (INHALATION) at 12:08

## 2024-08-11 RX ADMIN — IPRATROPIUM BROMIDE AND ALBUTEROL SULFATE 3 ML: .5; 3 SOLUTION RESPIRATORY (INHALATION) at 16:29

## 2024-08-11 RX ADMIN — SUCRALFATE 1 G: 1 TABLET ORAL at 17:42

## 2024-08-11 RX ADMIN — Medication 0.5 MCG/KG/MIN: at 23:54

## 2024-08-11 RX ADMIN — GABAPENTIN 600 MG: 600 TABLET, FILM COATED ORAL at 13:06

## 2024-08-11 RX ADMIN — APIXABAN 5 MG: 5 TABLET, FILM COATED ORAL at 20:12

## 2024-08-11 RX ADMIN — AMIODARONE HYDROCHLORIDE 150 MG: 1.5 INJECTION, SOLUTION INTRAVENOUS at 21:57

## 2024-08-11 RX ADMIN — INSULIN ASPART 1 UNITS: 100 INJECTION, SOLUTION INTRAVENOUS; SUBCUTANEOUS at 12:02

## 2024-08-11 RX ADMIN — SUCRALFATE 1 G: 1 TABLET ORAL at 11:14

## 2024-08-11 RX ADMIN — VANCOMYCIN HYDROCHLORIDE 1500 MG: 5 INJECTION, POWDER, LYOPHILIZED, FOR SOLUTION INTRAVENOUS at 14:39

## 2024-08-11 RX ADMIN — MIDAZOLAM 1 MG: 1 INJECTION INTRAMUSCULAR; INTRAVENOUS at 09:31

## 2024-08-11 RX ADMIN — Medication 1 SPRAY: at 20:13

## 2024-08-11 RX ADMIN — INSULIN ASPART 1 UNITS: 100 INJECTION, SOLUTION INTRAVENOUS; SUBCUTANEOUS at 17:41

## 2024-08-11 RX ADMIN — NYSTATIN 500000 UNITS: 100000 SUSPENSION ORAL at 17:43

## 2024-08-11 ASSESSMENT — ACTIVITIES OF DAILY LIVING (ADL)
ADLS_ACUITY_SCORE: 30
ADLS_ACUITY_SCORE: 40
ADLS_ACUITY_SCORE: 30
ADLS_ACUITY_SCORE: 40
ADLS_ACUITY_SCORE: 30
ADLS_ACUITY_SCORE: 40
ADLS_ACUITY_SCORE: 30
ADLS_ACUITY_SCORE: 40
ADLS_ACUITY_SCORE: 40
ADLS_ACUITY_SCORE: 30
ADLS_ACUITY_SCORE: 40
ADLS_ACUITY_SCORE: 30
ADLS_ACUITY_SCORE: 30
ADLS_ACUITY_SCORE: 40
ADLS_ACUITY_SCORE: 30
ADLS_ACUITY_SCORE: 30
ADLS_ACUITY_SCORE: 40
ADLS_ACUITY_SCORE: 39
ADLS_ACUITY_SCORE: 40

## 2024-08-11 NOTE — CARE PLAN
Shift Events:     PRN Medication given for pain (See MAR). Tele Hub Notified of Pt. Needing higher doses if Levophed. Albumin and Vasopressin ordered. Albumin given and Bps improved, Vasopressin not started(See MAR).      Assessment:    Neuro: Drowsy and oriented to person place and situation, intermittently confused. Afebrile, PERRLA    Respiratory: 5L Oxymask    Cardiovascular: Tele pacemaker, Aflutter HR 70s-100s, Levophed titrated to maintain MAP goal >65.     GI/: Voiding via Purewick. Diet: Pureed diet w/ Thickened liquids, NPO Since 0000. No BM this shift.    Lines/Drains: R) Triple lumen PICC, L) FA PIV, Chest tube to R) side    Gtts: Levophed@ 0.05mcg/kg/min,     Pain: PRN Medication given for pain (See MAR).       Problem: Risk for Delirium  Goal: Improved Sleep  Outcome: Progressing     Problem: Skin Injury Risk Increased  Goal: Skin Health and Integrity  Outcome: Progressing  Intervention: Plan: Nurse Driven Intervention: Moisture Management  Recent Flowsheet Documentation  Taken 8/11/2024 0400 by Talia Kelley RN  Moisture Interventions:   Encourage regular toileting   Incontinence pad   Urinary collection device  Taken 8/11/2024 0000 by Talia Kelley RN  Moisture Interventions:   Encourage regular toileting   Incontinence pad   Urinary collection device  Taken 8/10/2024 2000 by Talia Kelley RN  Moisture Interventions:   Encourage regular toileting   Incontinence pad   Urinary collection device  Intervention: Optimize Skin Protection  Recent Flowsheet Documentation  Taken 8/11/2024 0400 by Talia Kelley RN  Activity Management: activity adjusted per tolerance  Head of Bed (HOB) Positioning: HOB at 20-30 degrees  Taken 8/11/2024 0200 by Talia Kelley RN  Activity Management: activity adjusted per tolerance  Head of Bed (HOB) Positioning: HOB at 20-30 degrees  Taken 8/11/2024 0000 by Talia Kelley RN  Activity Management: activity adjusted per tolerance  Head of Bed (HOB) Positioning: HOB  at 20-30 degrees  Taken 8/10/2024 2200 by Talia Kelley RN  Activity Management: activity adjusted per tolerance  Head of Bed (HOB) Positioning: HOB at 20-30 degrees  Taken 8/10/2024 2000 by Talia Kelley RN  Activity Management: activity adjusted per tolerance  Head of Bed (HOB) Positioning: HOB at 20-30 degrees     Problem: Gas Exchange Impaired  Goal: Optimal Gas Exchange  Outcome: Progressing  Intervention: Optimize Oxygenation and Ventilation  Recent Flowsheet Documentation  Taken 8/11/2024 0400 by Talia Kelley RN  Head of Bed (HOB) Positioning: HOB at 20-30 degrees  Taken 8/11/2024 0200 by Talia Kelley RN  Head of Bed (HOB) Positioning: HOB at 20-30 degrees  Taken 8/11/2024 0000 by Talia Kelley RN  Head of Bed (HOB) Positioning: HOB at 20-30 degrees  Taken 8/10/2024 2200 by Talia Kelley RN  Head of Bed (HOB) Positioning: HOB at 20-30 degrees  Taken 8/10/2024 2000 by Talia Kelley RN  Head of Bed (HOB) Positioning: HOB at 20-30 degrees     Problem: Fall Injury Risk  Goal: Absence of Fall and Fall-Related Injury  Outcome: Progressing  Intervention: Promote Injury-Free Environment  Recent Flowsheet Documentation  Taken 8/11/2024 0400 by Talia Kelley RN  Safety Promotion/Fall Prevention: safety round/check completed  Taken 8/11/2024 0200 by Talia Kelley RN  Safety Promotion/Fall Prevention: safety round/check completed  Taken 8/11/2024 0000 by Talia Kelley RN  Safety Promotion/Fall Prevention: safety round/check completed  Taken 8/10/2024 2300 by Talia Kelley RN  Safety Promotion/Fall Prevention: safety round/check completed  Taken 8/10/2024 2200 by Talia Kelley RN  Safety Promotion/Fall Prevention: safety round/check completed  Taken 8/10/2024 2100 by Talia Kelley RN  Safety Promotion/Fall Prevention: safety round/check completed  Taken 8/10/2024 2000 by Talia Kelley RN  Safety Promotion/Fall Prevention: safety round/check completed     Problem: Pain Acute  Goal: Optimal  Pain Control and Function  Outcome: Progressing  Intervention: Develop Pain Management Plan  Recent Flowsheet Documentation  Taken 8/11/2024 0407 by Talia Kelley RN  Pain Management Interventions: medication (see MAR)  Taken 8/10/2024 2139 by Talia Kelley RN  Pain Management Interventions: medication (see MAR)  Intervention: Prevent or Manage Pain  Recent Flowsheet Documentation  Taken 8/11/2024 0400 by Talia Kelley RN  Sensory Stimulation Regulation: care clustered  Taken 8/11/2024 0000 by Talia Kelley RN  Sensory Stimulation Regulation: care clustered  Taken 8/10/2024 2000 by Talia Kelley RN  Sensory Stimulation Regulation: care clustered     Problem: Infection  Goal: Absence of Infection Signs and Symptoms  Outcome: Progressing   Goal Outcome Evaluation:

## 2024-08-11 NOTE — PROCEDURES
FIBEROPTIC BRONCHOSCOPY PROCEDURE NOTE     Date of Procedure: 08/11/2024  Performing Physician: Modesto Joseph MD  Pre-Procedure Diagnosis:   Right upper lobe and lower lobe atelectasis   Post-Procedure Diagnosis:    Oral candidiasis   Pneumonia . Hyperemic bronchial mucosa and mild thick secretions aspirated from bronchus intermedius  No endobronchial lesions     Procedure:  Diagnostic Flexible Fiberoptic Bronchoscopy   Indications:  Mary Kay Tejada is a 74 year old female with history of HTN, HFpEF, COPD. Admitted with pneumonia. CT scan showed in addition to large pleural effusion, atelectasis RLL and RUL, debris in bronchus. Previous CT scans showed collapse of RUL. A diagnostic bronchoscopy is indicated/   Preop evaluation:  Procedure:  Intravenous Sedation.    Expected level:  Moderate sedation  ASA Class:  IV/VI  Mallampati:  II/IV  Anesthesia:  2 mg Versed IV,  25 mcg fentanyl, 2 ml of 1% Xylocaine endobronchial , 5 ml of 4% Xylocaine to vocal cords.   Specimen:  BAL in both areas RUL and RLL.   Estimated Blood Loss:  0 ml  Complications:  Patient was on atrial flutter with RVR during procedure. 's. Stable Blood pressure.     Findings:  Oropharynx oral candidiasis   Vocal Cords  Normal    Trachea  hyperemic mucosa, mild thick secretions   Christi  hyperemic mucosa, mild thick secretions   Right Bronchial Tree , hyperemic mucosa, mild thick secretions, no endobronchial lesions   Left Bronchial Tree , hyperemic mucosa, mild thick secretions, no endobronchial lesions     Procedure Details:   The patient was seen and the risks, benefits, complications, treatment options, and expected outcomes were discussed with PATIENT   . The risks and potential complications of their problem and proposed treatment include but are not limited to infection, bleeding, pain, adverse drug reaction, pulmonary aspiration, the need for additional procedures, failure to diagnose a condition, creating a complication  requiring transfusion or operation, and complication secondary to the anesthetic.  The patient/alternate (see above) concurred with the proposed plan, giving informed consent.  The patient was identified as Mary Kay Tejada with Date of Birth 1950 and the procedure verified as Diagnostic Flexible Fiberoptic Bronchoscopy .  A Time Out was held and the above information confirmed.    After application of topical nasopharyngeal anesthesia, the patient was placed in the supine position and the bronchoscope was passed through the mouth   The vocal cords were visualized . The cord findings are noted above.  The scope was then passed into the trachea  Careful inspection of the tracheal lumen was accomplished. The scope was sequentially passed into the left main and then left upper and lower bronchi and segmental bronchi.   Findings and specimen details recorded above.  The scope was then withdrawn and advanced into the right main bronchus and then into the RUL, RML, and RLL bronchi and segmental bronchi.   Findings and specimen details recorded above.     Samples were obtained from BAL RUL and RLL.     The patient tolerated the procedure well.      Modesto Joseph MD, 08/11/2024 9:46 AM      Referring Physician: * No referring provider recorded for this case *  Attending Physician: Rayo Mendoza MD  Primary Care Physician: Cammy Bui

## 2024-08-11 NOTE — PROGRESS NOTES
RT assisted with bronch. 8ml of 4% lidocaine used, 3ml of 1% lidocaine used. 100ml of saline used. No complications with patient or bronch. Samples from right upper lobe and right lower lobe were walked down to lab.   Patient currently on 6L via oxymask after bronch and will continue to wean as able.     Carmita Rome, RT

## 2024-08-11 NOTE — PROGRESS NOTES
[facility]    Medicine Progress Note - Hospitalist Service    Date of Admission:  8/10/2024    Assessment & Plan      Mary Kay Tejada is a 74 year old female admitted on 8/10/2024. She has a pmhx of paroxysmal atrial flutter on apixaban, COPD, hyperlipidemia, diabetes mellitus type 2 with neuropathy, reflux, dizziness, tobacco use, fibromyalgia and chronic pain syndrome, prior pleural effusions and most recent admission in July found with E. coli pneumonia and a right sided pleural effusion which was consistent with a transudate and negative cultures.      Presentation of shortness of breath progressive over several days/week with acute worsening on day of admission.  Arrived to ER hypotensive MAP 60s and tachycardia 150s. Found with large right sided pleural effusion and respiratory acidosis and s/p bedside pigtail catheter chest tube placement in ER. On 6L then on bipap. Work of breathing improving but still soft pressures. HR then 70s. Fluid studies sent. BNP 8987, wbc 13.1, hgb 11.4. Started on zosyn, continued. Given lasix 60mg iv in total (40mg x1 and then 20mg q8 hrs). As there was an initial question of possible ICU cares, Pulmonary/ICU was already involved prior to admission, consulted for continuity and also chest tube management. Continued with q8 hrs gentle diuresis for now.  Chest tube was noted to have its tip at the right apex with plan for IR evaluation and RE-positioning.      Hospital team discussed with IR and no procedure for 8/10; patient was made n.p.o. midnight.  Subsequently in the evening, the patient started to have low blood pressures and that did not improve with a small bolus; antihypertensives and diuresis were held; started on norepinephrine, transferred to ICU, additional vasopressin was added.     On 8/11, initial question of potential transfer to OSH w/ IR for repositioning, but fortunately our intensivist/pulmonologist was able to complete this  repositioning directly at bedside on 8/11 along with a subsequent plan for bronchoscopy (showed oral candidiasis, hyperemic mucosa, and mild thick secretions, no endobronchial lesions).  Patient is now in aflutter and started on amiodarone drip and Cardiology will be consulted.  Speech evaluating.  Respiratory aerobic cultures growing GPCs, pending speciation. Continue broad abx for now and follow cultures. Appreciate pulmonary service and forthecoming cardiology service, defer any potential further diuresis to their expertise.    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  Now in atrial flutter and amiodarone was started.  Cardiology consulted, appreciate.    Plan:  -Appreciate pulmonary service and repositioning of the chest tube and pressor management  -Apixaban resumed after procedure and bronch  -amiodarone started; Cardiology consulted  -Antihypertensives and diuresis were held when the patient became hypotensive requiring ICU transfer overnight; defer further diuresis and resumption of antihypertensives to Cardiology, forthecoming input appreciated  --Scheduled DuoNebs and RT   -Saline nebulizers  -Dysphagia diet  -Speech consulted  -Continue vancomycin per pharmacy dosing  -Continue Zosyn, every 8 hours  - follow cultures, now GPC for speciation and S&R  - follow pending studies and bronch of RUL and RLL  -I's and O's and weights  -steroids per pulmonary   -fluid Restrict to 1.8  L    paroxysmal atrial flutter on apixaban  Very high heart rate to 150s on ER arrival but on admit was in the 70s  Overnight hypotensive requiring pressors, now in aflutter  A- on arrival 150s but on admit was 70s-80s; antihypertensives and diuresis were held as the patient ultimately required pressors overnight. Now in atrial flutter and amiodarone was started.  Cardiology consulted  P:  - initially continued metoprolol and diltiazem w/ hold parameters    - held overnight given hypotension requiring pressors  - apixaban now resumed  - Cardiology consulted, appreciate, defer any potential diuresis and the aforementioned currently-held meds to their expertise and amiodarone drip management     Hyperlipidemia  A/p- cont statin    diabetes mellitus type 2 with neuropathy  A/p- hold jardiance, ldssi and hypoglycemia protocol for now    reflux  A/p- stable, auto-sub ppi     fibromyalgia and chronic pain syndrome  A/p- hold prior pain meds w/c for respiratory depression; tylenol for now    Hx of TAVR  Hx of HFpEF  A/p- see primary issue, continue diuresis as above for now, I/Os and weights; cardiology consulted now pt is in aflutter on amio drip    Chronic constipation  A/p- stable, continue prn PTA meds            Diet: Fluid restriction 1800 ML FLUID  NPO for Medical/Clinical Reasons Except for: No Exceptions    DVT Prophylaxis: DOAC, Pneumatic Compression Devices, and Ambulate every shift  Chairez Catheter: Not present  Lines: PRESENT      PICC 08/10/24 Triple Lumen Right Brachial vein medial-Site Assessment: WDL      Cardiac Monitoring: ACTIVE order. Indication: Tachyarrhythmias, acute (48 hours)  Code Status: Full Code      Clinically Significant Risk Factors Present on Admission        # Hypokalemia: Lowest K = 3.3 mmol/L in last 2 days, will replace as needed       # Hypoalbuminemia: Lowest albumin = 3.1 g/dL at 8/10/2024  9:37 AM, will monitor as appropriate  # Drug Induced Coagulation Defect: home medication list  includes an anticoagulant medication    # Hypertension: Noted on problem list   # Circulatory Shock: required vasopressors within past 24 hours     # Anemia: based on hgb <11           # Financial/Environmental Concerns: none   # Pacemaker present             Disposition Plan     Medically Ready for Discharge: Anticipated in 2-4 Days             Rayo Mendoza MD  Hospitalist Service  [facility]  Securely message with [employer] (more info)  Text page via [employer] Paging/Directory   ______________________________________________________________________    Interval History   After admission, Hospital team discussed with IR yesterday evening and no procedure for 8/10; patient was made n.p.o. midnight.  Subsequently in the evening, the patient started to have low blood pressures and that did not improve with a small bolus; antihypertensives and diuresis were held; started on norepinephrine, transferred to ICU, additional vasopressin was added.   -this AM, pt has no new questions or further concerns  -updated her on care plans and successful chest tube repositioning by pulm/ICU  -discussed her meds and drips and also aflutter and Cardiology consult  -discussed abx and her upcoming bronch, answered all her Qs  -discussed w/ bedside team  -updated SW  -later chart check note bronch showed oral candidiasis, hyperemic mucosa, and mild thick secretions, no endobronchial lesions  -discussed directly with Pulm/ICU   -noted downward trend in hgb, recheck and monitor clinically     Physical Exam   Vital Signs: Temp: 97.7  F (36.5  C) Temp src: Axillary BP: 105/64 Pulse: (!) 160   Resp: 22 SpO2: 93 % O2 Device: Oxymask Oxygen Delivery: 10 LPM  Weight: 143 lbs 11.84 oz    General: alert, oriented, calm while in ICU  Pulmonary: coarse breath sounds, decreased at bases, on 10L oxymask, increased effort with speaking; CT repositioned and appears stable   CVS: irregular, aflutter on tele, no murmurs, rubs, or  gallops; no blatant jugular venous distention; no extremity edema and extremities are warm to the touch  GI: soft, nontender, BS+, no rebound or guarding, no conspicuous organomegaly   Neuro: nonfocal, moves all extremities of own volition  Psych: appropriate      Medical Decision Making       65 MINUTES SPENT BY ME on the date of service doing chart review, history, exam, documentation & further activities per the note.      Data   ------------------------- PAST 24 HR DATA REVIEWED -----------------------------------------------    I have personally reviewed the following data over the past 24 hrs:    10.8  \   9.3 (L)   / 180     143 105 23.1 (H) /  144 (H)   4.4 32 (H) 0.75 \     ALT: N/A AST: N/A AP: N/A TBILI: N/A   ALB: N/A TOT PROTEIN: N/A LIPASE: N/A     Procal: N/A CRP: N/A Lactic Acid: 1.7       Ferritin:  N/A % Retic:  N/A LDH:  701 (H)       Imaging results reviewed over the past 24 hrs:   Recent Results (from the past 24 hour(s))   XR Chest Port 1 View    Narrative    EXAM: XR CHEST PORTABLE 1 VIEW  LOCATION: [facility]  DATE: 08/10/2024    INDICATION: Status post right pigtail chest tube.  COMPARISON: 08/10/2024 at 1010 hours.      Impression    IMPRESSION: No change in dual-lead cardiac pacer or TAVR. Right-sided chest tube has been placed since comparison study with decrease in the previously seen large right pleural effusion. A moderate to large pleural effusion remains. There is likely   overlying compressive atelectasis of the inferior portion of the right lung with concurrent infectious process not excluded. There is some indistinctness of the pulmonary interstitium involving the left lower lobe which is new from prior study and may   reflect airway inflammation or edema.     CT Chest w Contrast    Narrative    EXAM: CT CHEST W CONTRAST  LOCATION: [facility]  DATE: 8/10/2024    INDICATION: SOB, large right-sided pleural effusion status post  right pigtail  COMPARISON: None.  TECHNIQUE: CT chest with IV contrast. Multiplanar reformats were obtained. Dose reduction techniques were used.    CONTRAST: 90 mL [id number]    FINDINGS:   LUNGS AND PLEURA: Large right effusion. Pigtail catheter is at the anterior right apex without significant fluid surrounding it. Extensive compressive atelectasis throughout the right lung. Left lung clear.    MEDIASTINUM/AXILLAE: No lymphadenopathy. No thoracic aneurysm.    CORONARY ARTERY CALCIFICATION: Moderate.    UPPER ABDOMEN: No acute findings.    MUSCULOSKELETAL: No frankly destructive bony lesions.      Impression    IMPRESSION:   1.  Large right effusion and extensive compressive atelectasis versus less likely infiltrate in the right lung.  2.  The pigtail catheter tip is at the anterior apex, most of the fluid is at the base.               XR Chest Port 1 View    Narrative    EXAM: XR CHEST PORT 1 VIEW  LOCATION: [facility]  DATE: 8/10/2024    INDICATION: RN placed PICC   verify tip placement  COMPARISON: 8/10/2024      Impression    IMPRESSION:     New right PICC with tip near the cavoatrial junction.    Otherwise, no significant interval change.   XR Chest 1 View    Narrative    EXAM: XR CHEST 1 VIEW  LOCATION: [facility]  DATE: 08/11/2024    INDICATION: New chest tube placement.  COMPARISON: 08/10/2024 CXR and CT chest.      Impression    IMPRESSION: Interval placement right basilar pleural drainage catheter. Previously seen large right pleural effusion has been nearly completely evacuated and the mid and lower right lung have partially reexpanded. No pneumothorax. Left lung clear. Mild   cardiac enlargement. Transcatheter aortic valve replacement. Pacer anterior left chest wall with lead tips in the RA and RV. Right PICC tip RA/SVC junction.

## 2024-08-11 NOTE — CONSULTS
HEART CARE CONSULTATON NOTE        Assessment/Recommendations   Assessment:    1.  Acute on chronic hypoxic respiratory failure   2.  Large right-sided pleural effusion with recent admission with E. coli pneumonia status post chest tube placement on 8/10 requiring new chest tube today.  Also underwent bronchoscopy today for diagnostic purposes.   3.  Acute on chronic heart failure with preserved ejection fraction: Diuretics on hold due to severe hypotension on pressor support  4.  Severe aortic stenosis: Status post TAVR February 2024.   5.  Paroxysmal atrial fibrillation/flutter: Currently persistent atrial flutter with global rates and rapid ventricular response since 7/21.  Currently hypotensive limiting options in terms of medical therapy.  Recommend amiodarone  5.  Sinus node dysfunction status post pacemaker placement  Plan:  1.   Amiodarone bolus and drip.    2.  Continue Eliquis  3.  Currently on pressor support  4.  Will benefit from diuresis once hemodynamics stabilize    Clinically Significant Risk Factors Present on Admission        # Hypokalemia: Lowest K = 3.3 mmol/L in last 2 days, will replace as needed         # Hypoalbuminemia: Lowest albumin = 3.1 g/dL at 8/10/2024  9:37 AM, will monitor as appropriate  # Drug Induced Coagulation Defect: home medication list includes an anticoagulant medication      # Hypertension: Noted on problem list     # Circulatory Shock: required vasopressors within past 24 hours       # Anemia: based on hgb <11                 # Financial/Environmental Concerns: none       # Pacemaker present             History of Present Illness/Subjective    HPI: Mary Kay Tejada is a 74 year old female with history of severe aortic stenosis status post TAVR in February 2024, sinus node dysfunction status post PPM 3/7/2024, PAF/flutter persistent since 7/21 on device check, chronic heart failure with preserved ejection fraction, tobacco abuse, COPD, mild to moderate nonobstructive  "CAD, DM 2 admitted on 8/10 with worsening shortness of breath, hypotension and tachycardia.  Found to have a large right-sided pleural effusion and underwent chest tube placement in the ED.  Was recently admitted in July with E. coli pneumonia and COVID with CT showing right upper lobe collapse and right side pleural effusion developed pneumothorax on the right side.  Cardiology asked to see patient due to atrial flutter with rapid ventricular response.  She has been in atrial flutter throughout the admission with variable rates.  Reviewing her chart she has been in persistent A-fib/flutter since 7/21.  Today further decompensation and required repositioning and underwent new chest tube placement and diagnostic bronchoscopy.       Physical Examination  Review of Systems   VITALS: BP (!) 86/66   Pulse 120   Temp 97.7  F (36.5  C) (Axillary)   Resp 22   Ht 1.651 m (5' 5\")   Wt 65.2 kg (143 lb 11.8 oz)   LMP  (LMP Unknown)   SpO2 94%   BMI 23.92 kg/m    BMI: Body mass index is 23.92 kg/m .  Wt Readings from Last 3 Encounters:   08/10/24 65.2 kg (143 lb 11.8 oz)   07/15/24 65.3 kg (143 lb 14.4 oz)   05/22/24 66.2 kg (146 lb)       Intake/Output Summary (Last 24 hours) at 8/11/2024 1216  Last data filed at 8/11/2024 1001  Gross per 24 hour   Intake 1426.36 ml   Output 4730 ml   Net -3303.64 ml     General Appearance:   somnolent   ENT/Mouth: membranes moist, no oral lesions or bleeding gums.      EYES:  no scleral icterus, normal conjunctivae       Chest/Lungs:   Decreased breath sounds, rhonchi   Cardiovascular:   Regular, tachycardic. Normal first and second heart sounds with no murmurs no edema bilaterally        Extremities: no cyanosis or clubbing   Skin: no xanthelasma, warm.    Neurologic: normal  bilateral, no tremors     Psychiatric: alert and oriented x3, calm     Review Of Systems  Skin: negative  Eyes: negative  Ears/Nose/Throat: negative  Respiratory: No shortness of breath, dyspnea on exertion, " cough, or hemoptysis  Cardiovascular: negative  Gastrointestinal: negative  Genitourinary: negative  Musculoskeletal: negative  Neurologic: negative  Psychiatric: negative  Hematologic/Lymphatic/Immunologic: negative  Endocrine: negative          Lab Results    Chemistry/lipid CBC Cardiac Enzymes/BNP/TSH/INR   Recent Labs   Lab Test 09/20/23  1136   CHOL 167   HDL 75   LDL 75   TRIG 83     Recent Labs   Lab Test 09/20/23  1136 07/25/22  1026 12/24/21  0756   LDL 75 53 77     Recent Labs   Lab Test 08/11/24  1201 08/11/24  1023 08/11/24  0917 08/11/24  0400   NA  --   --   --  143   POTASSIUM  --  4.4  --  3.3*   CHLORIDE  --   --   --  105   CO2  --   --   --  32*   *  --    < > 184*   BUN  --   --   --  23.1*   CR  --   --   --  0.75   GFRESTIMATED  --   --   --  83   JOEY  --   --   --  8.4*    < > = values in this interval not displayed.     Recent Labs   Lab Test 08/11/24  0400 08/10/24  0937 07/14/24  0829   CR 0.75 0.93 0.79     Recent Labs   Lab Test 02/06/24  1953 09/20/23  1136 05/25/23  0507   A1C 6.2* 6.1* 6.1*          Recent Labs   Lab Test 08/11/24  0400   WBC 10.8   HGB 9.3*   HCT 31.3*   MCV 83        Recent Labs   Lab Test 08/11/24  0400 08/10/24  0937 07/15/24  0451   HGB 9.3* 11.4* 10.5*    Recent Labs   Lab Test 05/24/23  1322 05/24/23  1015 02/08/23  0759   TROPONINI 0.07 0.08 0.09     Recent Labs   Lab Test 08/10/24  0937 07/13/24  0316 07/12/24  2030 02/19/24  1340 02/06/24  1953 06/02/23  0057 05/24/23  1015 02/08/23  0759   BNP  --   --   --   --   --  136* 176* 867*   NTBNPI 8,987* 698 804  --    < >  --   --   --    NTBNP  --   --   --  641  --   --   --   --     < > = values in this interval not displayed.     Recent Labs   Lab Test 08/10/24  0937   TSH 2.80     Recent Labs   Lab Test 07/13/24  0703 07/12/24  2030 02/19/24  1340   INR 1.27* 1.45* 1.04        Medical History  Surgical History Family History Social History   Past Medical History:   Diagnosis Date    Anemia      Aortic stenosis     Aortic valve disorder     Atrial fibrillation (H)     Atrial flutter (H)     Benign neoplasm of adenomatous polyp     large intestine     Chronic constipation     Chronic heart failure with preserved ejection fraction (H) 02/29/2024    Chronic pain syndrome     Congestive heart failure (H)     COPD (chronic obstructive pulmonary disease) (H)     Oxygen at night     Dependence on supplemental oxygen     Oxygen at noc, during the day as needed    Depression     Diabetes mellitus (H)     Dry eye syndrome     Fibromyalgia     Ganglion     left wrist    GERD (gastroesophageal reflux disease)     Hyperlipidemia     Hypertension     Hypokalemia     Infective otitis externa, unspecified     Created by Conversion     Larynx edema     Lung disease     Malignant neoplasm of vulva (H)     Created by Conversion [facility] Annotation: Apr 17 2007  8:24AM - Cammy Bui:  resection per Dr. Alfonso Mane 9/06;  Replacement Utility updated for latest IMO load    Medial epicondylitis     Onychomycosis     Osteoarthritis     Peptic ulcer     Polyneuropathy     Vulvar malignant neoplasm (H)      Past Surgical History:   Procedure Laterality Date    BIOPSY BREAST Right     BIOPSY BREAST Right 01/28/2015    BIOPSY BREAST Right 01/28/2015    Procedure: RIGHT BREAST BIOPSY AFTER WIRE LOCALIZATION AT 0940;  Surgeon: Renée Soriano MD;  Location: [facility];  Service:     BIOPSY OF BREAST, INCISIONAL      Description: Incisional Breast Biopsy;  Recorded: 11/13/2007;  Comments: benign    COLONOSCOPY N/A 06/14/2019    Procedure: COLONOSCOPY;  Surgeon: Eduardo Mora MD;  Location: [facility];  Service: Gastroenterology    CV CORONARY ANGIOGRAM N/A 02/08/2024    Procedure: CV CORONARY ANGIOGRAM;  Surgeon: Moises Valencia MD;  Location: [facility] CATH LAB CV    CV LEFT HEART CATH N/A 02/08/2024    Procedure: Left Heart Catheterization;  Surgeon: Moises Valencia MD;  Location: [facility]  CATH LAB CV    CV RIGHT HEART CATH MEASUREMENTS RECORDED N/A 02/08/2024    Procedure: Right Heart Catheterization;  Surgeon: Moises Valencia MD;  Location: [facility] LAB CV    CV TRANSCATHETER AORTIC VALVE REPLACEMENT-FEMORAL APPROACH N/A 02/20/2024    Procedure: Transcatheter Aortic Valve Replacement, possible cardiopulmonary bypass, possible surgical intervention;  Surgeon: Moises Valencia MD;  Location: [facility] LAB CV    EP PACEMAKER DEVICE & IMPLANT- HIS LEAD DUAL N/A 3/7/2024    Procedure: Pacemaker Device & Lead Implant - HIS Lead Dual;  Surgeon: Donnell Leon MD;  Location: [facility] LAB CV    ESOPHAGOSCOPY, GASTROSCOPY, DUODENOSCOPY (EGD), COMBINED N/A 11/06/2018    Procedure: ESOPHAGOGASTRODUODENOSCOPY;  Surgeon: Lit Fernando MD;  Location: [facility];  Service:     HYSTERECTOMY      JOINT REPLACEMENT Left     TKA    OR TRANSCATHETER AORTIC VALVE REPLACEMENT, FEMORAL PERCUTANEOUS APPROACH (STANDBY) N/A 02/20/2024    Procedure: OR TRANSCATHETER AORTIC VALVE REPLACEMENT, FEMORAL PERCUTANEOUS APPROACH (STANDBY);  Surgeon: Ishmael Farias MD;  Location: [facility] CV    PICC TRIPLE LUMEN PLACEMENT  01/12/2023         PICC TRIPLE LUMEN PLACEMENT  8/10/2024    OH ABLATE HEART DYSRHYTHM FOCUS      Description: Catheter Ablation Atrial Fibrillation;  Recorded: 07/31/2012;  Comments: 7/24/12 PVI with Dr. Gardiner and nilay to all 5 pulm veins and CTI fl ablation line as well.    TRANSCATHETER AORTIC-VALVE REPLACEMENT      ZZC SUPRACERV ABD HYSTERECTOMY      Description: Supracervical Hysterectomy;  Proc Date: 01/01/1985;  Comments: some cervix left!; ovaries intact; done for bleeding     Family History   Problem Relation Age of Onset    Heart Failure Mother     Cancer Other         paternal HX-laryngeal     Alcoholism Sister     No Known Problems Daughter     No Known Problems Maternal Grandmother     No Known Problems Maternal Grandfather     No Known Problems  Paternal Grandmother     No Known Problems Paternal Grandfather     No Known Problems Maternal Aunt     No Known Problems Paternal Aunt     Alcoholism Sister     Alcoholism Brother     Alcoholism Father     Cancer Paternal Uncle         Gastric-Alcohol    Cancer Paternal Uncle         gastric-Alcohol    Hereditary Breast and Ovarian Cancer Syndrome No family hx of     Breast Cancer No family hx of     Colon Cancer No family hx of     Endometrial Cancer No family hx of     Ovarian Cancer No family hx of         Social History     Socioeconomic History    Marital status:      Spouse name: Not on file    Number of children: Not on file    Years of education: Not on file    Highest education level: Not on file   Occupational History    Not on file   Tobacco Use    Smoking status: Some Days     Current packs/day: 0.25     Types: Cigarettes     Passive exposure: Never    Smokeless tobacco: Never    Tobacco comments:     seen by TTS inpatient on 3/31/22   Vaping Use    Vaping status: Never Used   Substance and Sexual Activity    Alcohol use: Yes     Comment: Alcoholic Drinks/day: very little    Drug use: No    Sexual activity: Not on file   Other Topics Concern    Not on file   Social History Narrative    Not on file     Social Determinants of Health     Financial Resource Strain: Low Risk  (12/26/2023)    Financial Resource Strain     Within the past 12 months, have you or your family members you live with been unable to get utilities (heat, electricity) when it was really needed?: No   Food Insecurity: Low Risk  (12/26/2023)    Food Insecurity     Within the past 12 months, did you worry that your food would run out before you got money to buy more?: No     Within the past 12 months, did the food you bought just not last and you didn t have money to get more?: No   Transportation Needs: Low Risk  (12/26/2023)    Transportation Needs     Within the past 12 months, has lack of transportation kept you from medical  "appointments, getting your medicines, non-medical meetings or appointments, work, or from getting things that you need?: No   Physical Activity: Not on file   Stress: Not on file   Social Connections: Not on file   Interpersonal Safety: Low Risk  (4/1/2024)    Interpersonal Safety     Do you feel physically and emotionally safe where you currently live?: Yes     Within the past 12 months, have you been hit, slapped, kicked or otherwise physically hurt by someone?: No     Within the past 12 months, have you been humiliated or emotionally abused in other ways by your partner or ex-partner?: No   Housing Stability: Low Risk  (12/26/2023)    Housing Stability     Do you have housing? : Yes     Are you worried about losing your housing?: No         Medications  Allergies   No current outpatient medications on file.        Allergies   Allergen Reactions    Celebrex [Celecoxib] Rash     patient had butterfly rash - \"lupus-like\"      Latex Rash         Татьяна Marmolejo MD      "

## 2024-08-11 NOTE — PROGRESS NOTES
Speech Pathology     08/11/24 0900   Appointment Info   Signing Clinician's Name / Credentials (SLP) Zahra Jose MA CCC-SLP   Appointment Canceled Reason (SLP) At procedure/test;Other (see Cancel Comments row)  Chest tube being placed at this time and plan for further imaging/follow up once placed; NPO at this time for procedure/test. Patient reportedly had thin liquids yesterday without cough or concern by RN.    Appointment Cancel Comments (SLP) Patient denies pharyngeal dysphagia symptoms with liquids, she does endorse some mild pharygneal sticking with some solids such as a sandwich. Diet listed as puree/moderately thick per MD. Consider thin liquids as tolerated until ST is able to see. SLP not available again today, will follow up tomorrow (Monday).

## 2024-08-11 NOTE — PROGRESS NOTES
ICU Brief Note    Patient back from chest tube placement, already 1400 out, pressor requirements increasing, will add vasopressin as well bolus albumin given output.     Will follow hemodynamics and outputs    Jorje Montejo MD

## 2024-08-11 NOTE — PHARMACY-VANCOMYCIN DOSING SERVICE
"Pharmacy Vancomycin Initial Note  Date of Service 2024  Patient's  1950  74 year old, female    Indication: Community Acquired Pneumonia and Sepsis    Current estimated CrCl = Estimated Creatinine Clearance: 67.7 mL/min (based on SCr of 0.75 mg/dL).    Creatinine for last 3 days  8/10/2024:  9:37 AM Creatinine 0.93 mg/dL  2024:  4:00 AM Creatinine 0.75 mg/dL    Recent Vancomycin Level(s) for last 3 days  No results found for requested labs within last 3 days.      Vancomycin IV Administrations (past 72 hours)                     vancomycin (VANCOCIN) 1,250 mg in 0.9% NaCl 250 mL intermittent infusion (mg) 1,250 mg New Bag 08/10/24 1428                    Nephrotoxins and other renal medications (From now, onward)      Start     Dose/Rate Route Frequency Ordered Stop    24 1400  vancomycin (VANCOCIN) 1,500 mg in 0.9% NaCl 250 mL intermittent infusion         1,500 mg  166.7 mL/hr over 90 Minutes Intravenous EVERY 24 HOURS 24 0800      08/10/24 2030  vasopressin (VASOSTRICT) 20 Units in sodium chloride 0.9 % 100 mL standard conc infusion         2.4 Units/hr  12 mL/hr  Intravenous CONTINUOUS 08/10/24 2003      08/10/24 1830  piperacillin-tazobactam (ZOSYN) 3.375 g vial to attach to  mL bag        Note to Pharmacy: For [facility], [facility] and [facility]: For Zosyn-naive patients, use the \"Zosyn initial dose + extended infusion\" order panel.    3.375 g  over 240 Minutes Intravenous EVERY 8 HOURS 08/10/24 1245      08/10/24 1730  norepinephrine (LEVOPHED) 4 mg in  mL infusion PREMIX         0.01-0.6 mcg/kg/min × 63.5 kg  2.4-142.9 mL/hr  Intravenous CONTINUOUS 08/10/24 1729      08/10/24 1330  [Held by provider]  furosemide (LASIX) injection 20 mg        (On hold since yesterday at 1716 until manually unheld; held by Rayo Mendoza MDHold Reason: Change in Vitals)    20 mg  over 1-3 Minutes Intravenous EVERY 8 HOURS 08/10/24 1245              Contrast Orders - past 72 hours (72h ago, onward) "      Start     Dose/Rate Route Frequency Stop    08/10/24 1200  iopamidol (ISOVUE-370) solution 100 mL         100 mL Intravenous ONCE 08/10/24 1201            [employer] Prediction of Planned Initial Vancomycin Regimen  Regimen: 1500 mg IV every 24 hours.  Start time: 02:28 on 08/11/2024  Exposure target: AUC24 (range)400-600 mg/L.hr   AUC24,ss: 514 mg/L.hr  Probability of AUC24 > 400: 77 %  Ctrough,ss: 14 mg/L  Probability of Ctrough,ss > 20: 22 %  Probability of nephrotoxicity (Lodise MODESTO 2009): 9 %          Plan:  Start vancomycin  1500 mg IV q24h. Empiric increase in dose with decreased creatinine.  Vancomycin monitoring method: AUC  Vancomycin therapeutic monitoring goal: 400-600 mg*h/L  Pharmacy will check vancomycin levels as appropriate in  8/11 am labs .    Serum creatinine levels will be ordered daily for the first week of therapy and at least twice weekly for subsequent weeks.      Renée Tony, [facility]

## 2024-08-11 NOTE — PROCEDURES
CHEST TUBE PLACEMENT PROCEDURE NOTE  (NON-OR)    Procedure Date: 8/11/2024   Performing Physician: Modesto Joseph MD    Pre-Procedure Diagnosis:   large right pleural effusion, reposition of chest tube  Post-Procedure Diagnosis: same as pre-procedure diagnosis  Procedure:  Chest Tube Insertion right Chest    Indications:   Pleural Effusion       Estimated Blood Loss: Minimal ml  Complications:  None      Procedure Details:    Informed consent, after discussion of the risks which include infection, bleeding, lung perforation, arrhythmia, pain, the need for additional procedures, the benefits, and alternatives to the procedure, was obtained and the consent form was signed by the patient.    A time out to verify the correct patient, procedure and site was performed.    Procedural sedation   The patient was placed in the appropriate position.  The right lateral chest wall was anesthetized using local infiltration with 1% lidocaine.  A small incision was made using a 9 blade.  Using a modified seldinger technique, a  14   -Fr. chest tube was inserted.  This was secured in place and reinforced with a dressing.  The chest tube was attached to the pleurovac.  A post-chest tube insertion CXR was obtained.      Condition: stable    Modesto Joseph MD, 8/11/2024, 8:30  AM    Referring Physician: No ref. provider found  Attending Physician: Rayo Mendoza MD  Primary Care Physician: Cammy Bui

## 2024-08-11 NOTE — PROGRESS NOTES
PULMONARY / CRITICAL CARE PROGRESS NOTE    Date / Time of Admission:  8/10/2024  9:18 AM    Assessment:     Mary Kay Tejada is a 74 year old female with history of HTN, CAD, HFpEF, afib on DOAC, aortic stenosis s/p TAVR, s/p pacemaker, COPD, tobacco user.   Recent admission 7/12 to 7/17 with acute hypercarbic respiratory failure, pneumonia and COVI19 viral infection. CT scan showed RUL collapse, right effusion. Sputum Cx (+) E coli. Developed pneumothorax post right side thoracentesis. Improvement of pneumothorax in serial CXR. Discharged home on O2 supplementation 3 LPM, and Abx and follow up in clinic.   Presents to ED on 8/10/2024 for evaluation of shortness of breath.   Patient was in moderate respiratory distress, hypotensive, hypoxic.   O2 supplementation NRB mask.   Labs showed elevated LFTs, elevated WBC, negative procalcitonin. Acute on chronic respiratory acidosis. CXR showed large right pleural effusion.   ED physician placed a right side chest tube. Follow up CXR showed mild decrease right side effusion, tip of chest tube towards right apex. Had a follow up chest CT scan, study showed RUL atelectasis, debris in right lower lobe bronchus, moderate right effusion.   Admitted to ICU , treated for pneumonia, steroids added for COPD exacerbation. Patient was hypotensive, requiring pressors. Sputum Cx Gram (+) cocci.   New chest tube and diagnostic bronchoscopy were done on 8/11. No endobronchial lesions. Patient developed a flutter with RVR, cardiology consulted.     Acute on chronic respiratory failure   Large right pleural effusion   Recent admission with E coli pneumonia and right pleural effusion. Pleural fluids 7/15/2024 showed lymphocytic predominant, transudate effusion, negative cytology and cultures.   Presented on 8/10, large right pleural effusion. Chest tube placement in ED, tip towards right apex.    Transudate fluid. Predominant neutrophilic.   Chest tube was repositioned on 8/11, follow up  CXR showed resolution of pleural effusion.   Follow up cytology studies.   Volume up status  Pneumonia   CT scan showed debris in airway. Chronic collapse right upper lobe. Atelectasis RLL.   Patient reports dysphagia.   S/p diagnostic bronchoscopy 8/11, oral candidiasis, hyperemic mucosa, and mild thick secretions, no endobronchial lesions. BAL RUL and RLL were done.    Continue pulmonary toiletting. Speech therapy evaluation  Follow up cultures. Broad Abx zosyn and vancomycin  COPD exacerbation  Systemic steroids, schedule bronchodilators   A fib / flutter with RVR  Started on amiodarone. Cardiology consult. Continue anticoagulation.   Oral candidiasis   HTN, CAD, HFpEF, aortic stenosis s/p TAVR, s/p pacemaker  Tobacco user    Advance Directives:  Full code    Plan:   Titrate FiO2 to keep SpO2 > 90%, currently on oxymask 6 LPM  Schedule bronchodilators  Saline nebs   Systemic steroids  Follow up Sputum Cx and BAL cultures and cytology   Broad Abx zosyn and vancomycin  Add oral nystatin swish and swallow , oral care.   Keep chest tube connected to suction , ok to clamp chest tube if patient is leaving the floor for procedures  Titrate pressors to keep MAP > 65  Amiodarone drip   Cardiology service was consulted  Monitor kidney function   Supplement electrolytes as needed  Dysphagia diet  Speech therapy evaluation   Glucose level monitoring   DVT prophylaxis , resume apixaban    Please contact me if you have any questions.    Modesto Joseph  Pulmonary / Critical Care  08/11/2024  9:58 AM      Subjective   HPI:  Mary Kay Tejada is a 74 year old female with history of HTN, CAD, HFpEF, afib on DOAC, aortic stenosis s/p TAVR, s/p pacemaker, COPD, tobacco user.   Recent admission 7/12 to 7/17 with acute hypercarbic respiratory failure, pneumonia and COVI19 viral infection. CT scan showed RUL collapse, right effusion. Sputum Cx (+) E coli. Developed pneumothorax post right side thoracentesis. Improvement of  pneumothorax in serial CXR. Discharged home on O2 supplementation 3 LPM, and Abx and follow up in clinic.   Presents to ED on 8/10/2024 for evaluation of shortness of breath.   Patient was in moderate respiratory distress, hypotensive, hypoxic.   O2 supplementation NRB mask.   Labs showed elevated LFTs, elevated WBC, negative procalcitonin.   CXR showed large right pleural effusion.   ED physician placed a right side chest tube. Follow up CXR showed mild decrease right side effusion, tip of chest tube towards right apex. Had a follow up chest CT scan, study showed RUL atelectasis, debris in right lower lobe bronchus, moderate right effusion. Pulmonary service consulted.    Events overnight  Transferred to ICU in view of hypotension and pressors.   Treated for pneumonia, steroids added for COPD exacerbation.   Decrease pressor requirements. Sputum Cx Gram (+) cocci.   A flutter with RVR, cardiology consulted.       Allergies: Celebrex [celecoxib] and Latex     MEDS:  Current Facility-Administered Medications   Medication Dose Route Frequency Provider Last Rate Last Admin    acetaminophen (TYLENOL) tablet 650 mg  650 mg Oral Q4H PRN Rayo Mendoza MD   650 mg at 08/11/24 0407    Or    acetaminophen (TYLENOL) Suppository 650 mg  650 mg Rectal Q4H PRN Rayo Mendoza MD        [Held by provider] albuterol (PROVENTIL HFA/VENTOLIN HFA) inhaler  2 puff Inhalation Q4H PRN Rayo Mendoza MD        amiodarone (NEXTERONE) 1.8 mg/mL in dextrose 5% 200 mL ADULT STANDARD infusion  1 mg/min Intravenous Continuous Modesto Londono MD        amiodarone (NEXTERONE) 1.8 mg/mL in dextrose 5% 200 mL ADULT STANDARD infusion  0.5 mg/min Intravenous Continuous Modesto Londono MD        amiodarone (NEXTERONE) bolus 150 mg  150 mg Intravenous Once Modesto Londono MD        [Held by provider] apixaban ANTICOAGULANT (ELIQUIS) tablet 5 mg  5 mg Oral BID Rayo Mendoza MD        artificial saliva (BIOTENE  MT) solution 1 spray  1 spray Mouth/Throat 4x Daily Rayo Mendoza MD        atorvastatin (LIPITOR) tablet 20 mg  20 mg Oral At Bedtime Rayo Mendoza MD   20 mg at 08/10/24 2101    benzocaine-menthol (CEPACOL) 15-3.6 MG lozenge 1 lozenge  1 lozenge Buccal Q1H PRN Rayo Mendoza MD        calcium carbonate (TUMS) chewable tablet 1,000 mg  1,000 mg Oral 4x Daily PRN Rayo Mendoza MD        glucose gel 15-30 g  15-30 g Oral Q15 Min PRN Rayo Mendoza MD        Or    dextrose 50 % injection 25-50 mL  25-50 mL Intravenous Q15 Min PRN Rayo Mendoza MD        Or    glucagon injection 1 mg  1 mg Subcutaneous Q15 Min PRN Rayo Mendoza MD        [Held by provider] diltiazem ER COATED BEADS (CARDIZEM CD/CARTIA XT) 24 hr capsule 120 mg  120 mg Oral Daily Modesto Londono MD        diltiazem ER COATED BEADS (CARDIZEM CD/CARTIA XT) 24 hr capsule 120 mg  120 mg Oral Daily Rayo Mendoza MD        flumazenil (ROMAZICON) injection 0.2 mg  0.2 mg Intravenous q1 min prn Modesto Londono MD        fluticasone-vilanterol (BREO ELLIPTA) 200-25 MCG/ACT inhaler 1 puff  1 puff Inhalation Daily Rayo Mendoza MD        [Held by provider] furosemide (LASIX) injection 20 mg  20 mg Intravenous Q8H Rayo Mendoza MD   20 mg at 08/10/24 1345    gabapentin (NEURONTIN) tablet 600 mg  600 mg Oral TID Rayo Mendoza MD   600 mg at 08/10/24 2036    insulin aspart (NovoLOG) injection (RAPID ACTING)  1-3 Units Subcutaneous TID AC Rayo Mendoza MD        insulin aspart (NovoLOG) injection (RAPID ACTING)  1-3 Units Subcutaneous At Bedtime Rayo Mendoza MD   1 Units at 08/10/24 2101    ipratropium - albuterol 0.5 mg/2.5 mg/3 mL (DUONEB) neb solution 3 mL  3 mL Nebulization 4x daily Rayo Mendoza MD   3 mL at 08/10/24 1900    lidocaine (LMX4) cream   Topical Q1H PRN Rayo Mendoza MD        lidocaine 1 % 0.1-1 mL  0.1-1 mL Other Q1H PRN Rayo Mendoza MD        lidocaine 1 % 0.1-5 mL  0.1-5 mL Other Q1H PRN Rayo Mendoza MD    1 mL at 08/10/24 1615    melatonin tablet 1 mg  1 mg Oral At Bedtime PRN Rayo Mendoza MD        methylPREDNISolone sodium succinate (SOLU-MEDROL) injection 40 mg  40 mg Intravenous Q12H Modesto Londono MD   40 mg at 08/11/24 0442    [Held by provider] metoprolol tartrate (LOPRESSOR) tablet 50 mg  50 mg Oral BID Modesto Londono MD        metoprolol tartrate (LOPRESSOR) tablet 50 mg  50 mg Oral BID Rayo Mendoza MD        miconazole (MICATIN) 2 % powder   Topical TID PRN Rayo Mendoza MD        naloxone (NARCAN) injection 0.2 mg  0.2 mg Intravenous Q2 Min PRN Modesto Londono MD        naloxone (NARCAN) injection 0.2 mg  0.2 mg Intramuscular Q2 Min PRN Modesto Londono MD        naloxone (NARCAN) injection 0.4 mg  0.4 mg Intravenous Q2 Min PRN Modesto Londono MD        naloxone (NARCAN) injection 0.4 mg  0.4 mg Intramuscular Q2 Min PRN Modesto Londono MD        norepinephrine (LEVOPHED) 4 mg in  mL infusion PREMIX  0.01-0.6 mcg/kg/min Intravenous Continuous Modesto Londono MD 11.9 mL/hr at 08/11/24 0445 0.05 mcg/kg/min at 08/11/24 0445    ondansetron (ZOFRAN ODT) ODT tab 4 mg  4 mg Oral Q6H PRN Modesto Londono MD        Or    ondansetron (ZOFRAN) injection 4 mg  4 mg Intravenous Q6H PRN Modesto Londono MD        pantoprazole (PROTONIX) 2 mg/mL suspension 40 mg  40 mg Oral QAM AC Rayo Mendoza MD        piperacillin-tazobactam (ZOSYN) 3.375 g vial to attach to  mL bag  3.375 g Intravenous Q8H Rayo Mendoza MD   3.375 g at 08/11/24 0131    polyethylene glycol (MIRALAX) Packet 17 g  17 g Oral Daily PRN Rayo Mendoza MD        prochlorperazine (COMPAZINE) injection 5 mg  5 mg Intravenous Q6H PRN Rayo Mendoza MD        Or    prochlorperazine (COMPAZINE) tablet 5 mg  5 mg Oral Q6H PRN Rayo Mendoza MD        Or    prochlorperazine (COMPAZINE) suppository 12.5 mg  12.5 mg Rectal  "Q12H PRN Rayo Mendoza MD        senna-docusate (SENOKOT-S/PERICOLACE) 8.6-50 MG per tablet 1 tablet  1 tablet Oral BID PRN Rayo Mendoza MD        Or    senna-docusate (SENOKOT-S/PERICOLACE) 8.6-50 MG per tablet 2 tablet  2 tablet Oral BID PRN Rayo Mendoza MD        sennosides (SENOKOT) tablet 1 tablet  1 tablet Oral BID Rayo Mendoza MD   1 tablet at 08/10/24 2036    sodium chloride (NEBUSAL) 3 % neb solution 3 mL  3 mL Nebulization BID Rayo Mendoza MD   3 mL at 08/10/24 1900    sodium chloride (PF) 0.9% PF flush 10-20 mL  10-20 mL Intracatheter q1 min prn Keyanna Osullivan RN        sodium chloride (PF) 0.9% PF flush 10-40 mL  10-40 mL Intracatheter Q7 Days Keyanna Osullivan RN        sodium chloride (PF) 0.9% PF flush 10-40 mL  10-40 mL Intracatheter Q1H PRN Keyanna Osullivan RN        sodium chloride (PF) 0.9% PF flush 3 mL  3 mL Intracatheter Q8H Rayo Mendoza MD   3 mL at 08/11/24 0131    sodium chloride (PF) 0.9% PF flush 3 mL  3 mL Intracatheter q1 min prn Rayo Mendoza MD        sucralfate (CARAFATE) tablet 1 g  1 g Oral TID AC Rayo Mendoza MD   1 g at 08/10/24 1831    vancomycin (VANCOCIN) 1,500 mg in 0.9% NaCl 250 mL intermittent infusion  1,500 mg Intravenous Q24H Rayo Mendoza MD        vasopressin (VASOSTRICT) 20 Units in sodium chloride 0.9 % 100 mL standard conc infusion  2.4 Units/hr Intravenous Continuous ReJorje martin MD   Paused at 08/10/24 2040     Objective:   VITALS:  /51   Pulse (!) 159   Temp 97.7  F (36.5  C) (Axillary)   Resp 24   Ht 1.651 m (5' 5\")   Wt 65.2 kg (143 lb 11.8 oz)   LMP  (LMP Unknown)   SpO2 93%   BMI 23.92 kg/m    VENT:  FiO2 (%): 40 %  Resp: 24    EXAM:   Gen: sleepy, arousable, mild respiratory distress  HEENT: pale conjunctiva, dry mucosa, Mallampati II/IV  Neck: no thyromegaly, masses or JVD  Lungs: decrease breath sounds right base, wheezes both HT  CV: irregular, tachycardic, no murmurs or gallops appreciated  Abdomen: soft, NT, " BS wnl  Ext: no edema  Neuro: sleepy, arousable, non focal       Data Review:  Recent Labs   Lab 08/11/24  0917 08/11/24  0400 08/11/24  0207 08/10/24  2209 08/10/24  2055 08/10/24  1836   * 184* 191* 260* 264* 160*     SPUTUM GRAM STAIN  <10 Squamous epithelial cells/low power field      >25 PMNs/low power field      3+ Gram positive cocci      08/11/24 04:00   Sodium 143   Potassium 3.3 (L)   Chloride 105   Carbon Dioxide (CO2) 32 (H)   Urea Nitrogen 23.1 (H)   Creatinine 0.75   GFR Estimate 83   Calcium 8.4 (L)   Anion Gap 6 (L)   Magnesium 2.2   Glucose 184 (H)      08/11/24 04:00   WBC 10.8   Hemoglobin 9.3 (L)   Hematocrit 31.3 (L)   Platelet Count 180   RBC Count 3.77 (L)   MCV 83   MCH 24.7 (L)   MCHC 29.7 (L)   RDW 17.2 (H)       XR CHEST PORT 1 VIEW  LOCATION: [facility]  DATE: 8/10/2024   INDICATION: sob, hypoxic, history of prior ptx, eval for ptx, pna or edema  COMPARISON: 7/17/2020  IMPRESSION: Large right pleural effusion. No pneumothorax. Left subclavian approach pacing device. Prosthetic aortic valve. Cardiac silhouette within normal limits. No acute osseous abnormality.    XR CHEST 1 VIEW  LOCATION: [facility]  DATE: 08/11/2024   INDICATION: New chest tube placement.  COMPARISON: 08/10/2024 CXR and CT chest.  IMPRESSION: Interval placement right basilar pleural drainage catheter. Previously seen large right pleural effusion has been nearly completely evacuated and the mid and lower right lung have partially reexpanded. No pneumothorax. Left lung clear. Mild   cardiac enlargement. Transcatheter aortic valve replacement. Pacer anterior left chest wall with lead tips in the RA and RV. Right PICC tip RA/SVC junction.    By:  Modesto Joseph MD, 08/11/2024  9:58 AM    Primary Care Physician:  Cammy Bui

## 2024-08-11 NOTE — PROGRESS NOTES
RESPIRATORY CARE NOTE     Patient Name: Mary Kay Tejada  Today's Date: 8/11/2024       Pt continues to receive duoneb. BS are clear diminished. Pt is on 4 lpm of oxygen via NC, SpO2 is 94%. Post treatment there is increased aeration. Pt also perceives improvement.  RT encouraged deep breathing and coughing techniques .  RT will continue to monitor and assess.     Sarah Osorio, RT

## 2024-08-12 ENCOUNTER — APPOINTMENT (OUTPATIENT)
Dept: SPEECH THERAPY | Facility: CLINIC | Age: 74
DRG: 871 | End: 2024-08-12
Attending: STUDENT IN AN ORGANIZED HEALTH CARE EDUCATION/TRAINING PROGRAM
Payer: COMMERCIAL

## 2024-08-12 ENCOUNTER — APPOINTMENT (OUTPATIENT)
Dept: ULTRASOUND IMAGING | Facility: CLINIC | Age: 74
DRG: 871 | End: 2024-08-12
Attending: INTERNAL MEDICINE
Payer: COMMERCIAL

## 2024-08-12 LAB
% LINING CELLS, BODY FLUID: 20 %
ALBUMIN SERPL BCG-MCNC: 2.6 G/DL (ref 3.5–5.2)
ALP SERPL-CCNC: 188 U/L (ref 40–150)
ALT SERPL W P-5'-P-CCNC: 173 U/L (ref 0–50)
ANION GAP SERPL CALCULATED.3IONS-SCNC: 11 MMOL/L (ref 7–15)
AST SERPL W P-5'-P-CCNC: 97 U/L (ref 0–45)
ATRIAL RATE - MUSE: 46 BPM
BASOPHILS # BLD AUTO: 0 10E3/UL (ref 0–0.2)
BASOPHILS NFR BLD AUTO: 0 %
BILIRUB SERPL-MCNC: 0.3 MG/DL
BUN SERPL-MCNC: 26.4 MG/DL (ref 8–23)
CALCIUM SERPL-MCNC: 8.3 MG/DL (ref 8.8–10.4)
CHLORIDE SERPL-SCNC: 97 MMOL/L (ref 98–107)
CREAT SERPL-MCNC: 0.85 MG/DL (ref 0.51–0.95)
CRP SERPL-MCNC: 158 MG/L
DIASTOLIC BLOOD PRESSURE - MUSE: NORMAL MMHG
EGFRCR SERPLBLD CKD-EPI 2021: 71 ML/MIN/1.73M2
EOSINOPHIL # BLD AUTO: 0 10E3/UL (ref 0–0.7)
EOSINOPHIL NFR BLD AUTO: 0 %
ERYTHROCYTE [DISTWIDTH] IN BLOOD BY AUTOMATED COUNT: 17.3 % (ref 10–15)
GLUCOSE BLDC GLUCOMTR-MCNC: 168 MG/DL (ref 70–99)
GLUCOSE BLDC GLUCOMTR-MCNC: 176 MG/DL (ref 70–99)
GLUCOSE BLDC GLUCOMTR-MCNC: 213 MG/DL (ref 70–99)
GLUCOSE BLDC GLUCOMTR-MCNC: 286 MG/DL (ref 70–99)
GLUCOSE SERPL-MCNC: 351 MG/DL (ref 70–99)
HCO3 SERPL-SCNC: 29 MMOL/L (ref 22–29)
HCT VFR BLD AUTO: 31 % (ref 35–47)
HGB BLD-MCNC: 9 G/DL (ref 11.7–15.7)
IMM GRANULOCYTES # BLD: 0.1 10E3/UL
IMM GRANULOCYTES NFR BLD: 1 %
INTERPRETATION ECG - MUSE: NORMAL
LYMPHOCYTES # BLD AUTO: 0.4 10E3/UL (ref 0.8–5.3)
LYMPHOCYTES NFR BLD AUTO: 3 %
LYMPHOCYTES NFR FLD MANUAL: 9 %
MAGNESIUM SERPL-MCNC: 2.6 MG/DL (ref 1.7–2.3)
MCH RBC QN AUTO: 24.7 PG (ref 26.5–33)
MCHC RBC AUTO-ENTMCNC: 29 G/DL (ref 31.5–36.5)
MCV RBC AUTO: 85 FL (ref 78–100)
MDC_IDC_EPISODE_DTM: NORMAL
MDC_IDC_EPISODE_DURATION: 10 S
MDC_IDC_EPISODE_DURATION: 107 S
MDC_IDC_EPISODE_DURATION: 127 S
MDC_IDC_EPISODE_DURATION: 1380 S
MDC_IDC_EPISODE_DURATION: 14 S
MDC_IDC_EPISODE_DURATION: 1441 S
MDC_IDC_EPISODE_DURATION: 148 S
MDC_IDC_EPISODE_DURATION: 149 S
MDC_IDC_EPISODE_DURATION: 15 S
MDC_IDC_EPISODE_DURATION: 15 S
MDC_IDC_EPISODE_DURATION: 18 S
MDC_IDC_EPISODE_DURATION: 20 S
MDC_IDC_EPISODE_DURATION: 2097 S
MDC_IDC_EPISODE_DURATION: 2196 S
MDC_IDC_EPISODE_DURATION: 22 S
MDC_IDC_EPISODE_DURATION: 22 S
MDC_IDC_EPISODE_DURATION: 254 S
MDC_IDC_EPISODE_DURATION: 255 S
MDC_IDC_EPISODE_DURATION: 280 S
MDC_IDC_EPISODE_DURATION: 29 S
MDC_IDC_EPISODE_DURATION: 2946 S
MDC_IDC_EPISODE_DURATION: 30 S
MDC_IDC_EPISODE_DURATION: 30 S
MDC_IDC_EPISODE_DURATION: 31 S
MDC_IDC_EPISODE_DURATION: 325 S
MDC_IDC_EPISODE_DURATION: 37 S
MDC_IDC_EPISODE_DURATION: 394 S
MDC_IDC_EPISODE_DURATION: 427 S
MDC_IDC_EPISODE_DURATION: 4493 S
MDC_IDC_EPISODE_DURATION: 48 S
MDC_IDC_EPISODE_DURATION: 48 S
MDC_IDC_EPISODE_DURATION: 49 S
MDC_IDC_EPISODE_DURATION: 497 S
MDC_IDC_EPISODE_DURATION: 519 S
MDC_IDC_EPISODE_DURATION: 5248 S
MDC_IDC_EPISODE_DURATION: 6 S
MDC_IDC_EPISODE_DURATION: 647 S
MDC_IDC_EPISODE_DURATION: 87 S
MDC_IDC_EPISODE_DURATION: 9 S
MDC_IDC_EPISODE_DURATION: NORMAL S
MDC_IDC_EPISODE_DURATION: NORMAL S
MDC_IDC_EPISODE_ID: 1
MDC_IDC_EPISODE_ID: 10
MDC_IDC_EPISODE_ID: 11
MDC_IDC_EPISODE_ID: 12
MDC_IDC_EPISODE_ID: 13
MDC_IDC_EPISODE_ID: 14
MDC_IDC_EPISODE_ID: 15
MDC_IDC_EPISODE_ID: 16
MDC_IDC_EPISODE_ID: 17
MDC_IDC_EPISODE_ID: 18
MDC_IDC_EPISODE_ID: 19
MDC_IDC_EPISODE_ID: 2
MDC_IDC_EPISODE_ID: 20
MDC_IDC_EPISODE_ID: 21
MDC_IDC_EPISODE_ID: 22
MDC_IDC_EPISODE_ID: 23
MDC_IDC_EPISODE_ID: 24
MDC_IDC_EPISODE_ID: 25
MDC_IDC_EPISODE_ID: 26
MDC_IDC_EPISODE_ID: 27
MDC_IDC_EPISODE_ID: 28
MDC_IDC_EPISODE_ID: 29
MDC_IDC_EPISODE_ID: 3
MDC_IDC_EPISODE_ID: 30
MDC_IDC_EPISODE_ID: 31
MDC_IDC_EPISODE_ID: 32
MDC_IDC_EPISODE_ID: 33
MDC_IDC_EPISODE_ID: 34
MDC_IDC_EPISODE_ID: 35
MDC_IDC_EPISODE_ID: 36
MDC_IDC_EPISODE_ID: 37
MDC_IDC_EPISODE_ID: 38
MDC_IDC_EPISODE_ID: 39
MDC_IDC_EPISODE_ID: 4
MDC_IDC_EPISODE_ID: 40
MDC_IDC_EPISODE_ID: 41
MDC_IDC_EPISODE_ID: 42
MDC_IDC_EPISODE_ID: 43
MDC_IDC_EPISODE_ID: 5
MDC_IDC_EPISODE_ID: 6
MDC_IDC_EPISODE_ID: 7
MDC_IDC_EPISODE_ID: 8
MDC_IDC_EPISODE_ID: 9
MDC_IDC_EPISODE_TYPE: NORMAL
MDC_IDC_LEAD_CONNECTION_STATUS: NORMAL
MDC_IDC_LEAD_CONNECTION_STATUS: NORMAL
MDC_IDC_LEAD_IMPLANT_DT: NORMAL
MDC_IDC_LEAD_IMPLANT_DT: NORMAL
MDC_IDC_LEAD_LOCATION: NORMAL
MDC_IDC_LEAD_LOCATION: NORMAL
MDC_IDC_LEAD_LOCATION_DETAIL_1: NORMAL
MDC_IDC_LEAD_LOCATION_DETAIL_1: NORMAL
MDC_IDC_LEAD_MFG: NORMAL
MDC_IDC_LEAD_MFG: NORMAL
MDC_IDC_LEAD_MODEL: NORMAL
MDC_IDC_LEAD_MODEL: NORMAL
MDC_IDC_LEAD_POLARITY_TYPE: NORMAL
MDC_IDC_LEAD_POLARITY_TYPE: NORMAL
MDC_IDC_LEAD_SERIAL: NORMAL
MDC_IDC_LEAD_SERIAL: NORMAL
MDC_IDC_LEAD_SPECIAL_FUNCTION: NORMAL
MDC_IDC_LEAD_SPECIAL_FUNCTION: NORMAL
MDC_IDC_MSMT_BATTERY_DTM: NORMAL
MDC_IDC_MSMT_BATTERY_REMAINING_LONGEVITY: 171 MO
MDC_IDC_MSMT_BATTERY_RRT_TRIGGER: 2.62
MDC_IDC_MSMT_BATTERY_STATUS: NORMAL
MDC_IDC_MSMT_BATTERY_VOLTAGE: 3.19 V
MDC_IDC_MSMT_LEADCHNL_RA_IMPEDANCE_VALUE: 304 OHM
MDC_IDC_MSMT_LEADCHNL_RA_IMPEDANCE_VALUE: 437 OHM
MDC_IDC_MSMT_LEADCHNL_RA_PACING_THRESHOLD_AMPLITUDE: 0.75 V
MDC_IDC_MSMT_LEADCHNL_RA_PACING_THRESHOLD_PULSEWIDTH: 0.4 MS
MDC_IDC_MSMT_LEADCHNL_RA_SENSING_INTR_AMPL: 2.62 MV
MDC_IDC_MSMT_LEADCHNL_RA_SENSING_INTR_AMPL: 2.62 MV
MDC_IDC_MSMT_LEADCHNL_RV_IMPEDANCE_VALUE: 399 OHM
MDC_IDC_MSMT_LEADCHNL_RV_IMPEDANCE_VALUE: 608 OHM
MDC_IDC_MSMT_LEADCHNL_RV_PACING_THRESHOLD_AMPLITUDE: 1 V
MDC_IDC_MSMT_LEADCHNL_RV_PACING_THRESHOLD_PULSEWIDTH: 0.4 MS
MDC_IDC_MSMT_LEADCHNL_RV_SENSING_INTR_AMPL: 23.38 MV
MDC_IDC_MSMT_LEADCHNL_RV_SENSING_INTR_AMPL: 23.38 MV
MDC_IDC_PG_IMPLANT_DTM: NORMAL
MDC_IDC_PG_MFG: NORMAL
MDC_IDC_PG_MODEL: NORMAL
MDC_IDC_PG_SERIAL: NORMAL
MDC_IDC_PG_TYPE: NORMAL
MDC_IDC_SESS_CLINIC_NAME: NORMAL
MDC_IDC_SESS_DTM: NORMAL
MDC_IDC_SESS_TYPE: NORMAL
MDC_IDC_SET_BRADY_AT_MODE_SWITCH_RATE: 171 {BEATS}/MIN
MDC_IDC_SET_BRADY_HYSTRATE: NORMAL
MDC_IDC_SET_BRADY_LOWRATE: 60 {BEATS}/MIN
MDC_IDC_SET_BRADY_MAX_SENSOR_RATE: 130 {BEATS}/MIN
MDC_IDC_SET_BRADY_MAX_TRACKING_RATE: 130 {BEATS}/MIN
MDC_IDC_SET_BRADY_MODE: NORMAL
MDC_IDC_SET_BRADY_PAV_DELAY_LOW: 150 MS
MDC_IDC_SET_BRADY_SAV_DELAY_LOW: 120 MS
MDC_IDC_SET_LEADCHNL_RA_PACING_AMPLITUDE: 1.5 V
MDC_IDC_SET_LEADCHNL_RA_PACING_ANODE_ELECTRODE_1: NORMAL
MDC_IDC_SET_LEADCHNL_RA_PACING_ANODE_LOCATION_1: NORMAL
MDC_IDC_SET_LEADCHNL_RA_PACING_CAPTURE_MODE: NORMAL
MDC_IDC_SET_LEADCHNL_RA_PACING_CATHODE_ELECTRODE_1: NORMAL
MDC_IDC_SET_LEADCHNL_RA_PACING_CATHODE_LOCATION_1: NORMAL
MDC_IDC_SET_LEADCHNL_RA_PACING_POLARITY: NORMAL
MDC_IDC_SET_LEADCHNL_RA_PACING_PULSEWIDTH: 0.4 MS
MDC_IDC_SET_LEADCHNL_RA_SENSING_ANODE_ELECTRODE_1: NORMAL
MDC_IDC_SET_LEADCHNL_RA_SENSING_ANODE_LOCATION_1: NORMAL
MDC_IDC_SET_LEADCHNL_RA_SENSING_CATHODE_ELECTRODE_1: NORMAL
MDC_IDC_SET_LEADCHNL_RA_SENSING_CATHODE_LOCATION_1: NORMAL
MDC_IDC_SET_LEADCHNL_RA_SENSING_POLARITY: NORMAL
MDC_IDC_SET_LEADCHNL_RA_SENSING_SENSITIVITY: 0.3 MV
MDC_IDC_SET_LEADCHNL_RV_PACING_AMPLITUDE: 1.5 V
MDC_IDC_SET_LEADCHNL_RV_PACING_ANODE_ELECTRODE_1: NORMAL
MDC_IDC_SET_LEADCHNL_RV_PACING_ANODE_LOCATION_1: NORMAL
MDC_IDC_SET_LEADCHNL_RV_PACING_CAPTURE_MODE: NORMAL
MDC_IDC_SET_LEADCHNL_RV_PACING_CATHODE_ELECTRODE_1: NORMAL
MDC_IDC_SET_LEADCHNL_RV_PACING_CATHODE_LOCATION_1: NORMAL
MDC_IDC_SET_LEADCHNL_RV_PACING_POLARITY: NORMAL
MDC_IDC_SET_LEADCHNL_RV_PACING_PULSEWIDTH: 0.4 MS
MDC_IDC_SET_LEADCHNL_RV_SENSING_ANODE_ELECTRODE_1: NORMAL
MDC_IDC_SET_LEADCHNL_RV_SENSING_ANODE_LOCATION_1: NORMAL
MDC_IDC_SET_LEADCHNL_RV_SENSING_CATHODE_ELECTRODE_1: NORMAL
MDC_IDC_SET_LEADCHNL_RV_SENSING_CATHODE_LOCATION_1: NORMAL
MDC_IDC_SET_LEADCHNL_RV_SENSING_POLARITY: NORMAL
MDC_IDC_SET_LEADCHNL_RV_SENSING_SENSITIVITY: 0.9 MV
MDC_IDC_SET_ZONE_DETECTION_INTERVAL: 350 MS
MDC_IDC_SET_ZONE_DETECTION_INTERVAL: 400 MS
MDC_IDC_SET_ZONE_STATUS: NORMAL
MDC_IDC_SET_ZONE_STATUS: NORMAL
MDC_IDC_SET_ZONE_TYPE: NORMAL
MDC_IDC_SET_ZONE_VENDOR_TYPE: NORMAL
MDC_IDC_STAT_AT_BURDEN_PERCENT: 0.9 %
MDC_IDC_STAT_AT_DTM_END: NORMAL
MDC_IDC_STAT_AT_DTM_START: NORMAL
MDC_IDC_STAT_BRADY_AP_VP_PERCENT: 0.02 %
MDC_IDC_STAT_BRADY_AP_VS_PERCENT: 43.49 %
MDC_IDC_STAT_BRADY_AS_VP_PERCENT: 0.02 %
MDC_IDC_STAT_BRADY_AS_VS_PERCENT: 56.48 %
MDC_IDC_STAT_BRADY_DTM_END: NORMAL
MDC_IDC_STAT_BRADY_DTM_START: NORMAL
MDC_IDC_STAT_BRADY_RA_PERCENT_PACED: 43.41 %
MDC_IDC_STAT_BRADY_RV_PERCENT_PACED: 0.04 %
MDC_IDC_STAT_EPISODE_RECENT_COUNT: 0
MDC_IDC_STAT_EPISODE_RECENT_COUNT: 12
MDC_IDC_STAT_EPISODE_RECENT_COUNT: 24
MDC_IDC_STAT_EPISODE_RECENT_COUNT_DTM_END: NORMAL
MDC_IDC_STAT_EPISODE_RECENT_COUNT_DTM_START: NORMAL
MDC_IDC_STAT_EPISODE_TOTAL_COUNT: 0
MDC_IDC_STAT_EPISODE_TOTAL_COUNT: 12
MDC_IDC_STAT_EPISODE_TOTAL_COUNT: 24
MDC_IDC_STAT_EPISODE_TOTAL_COUNT_DTM_END: NORMAL
MDC_IDC_STAT_EPISODE_TOTAL_COUNT_DTM_START: NORMAL
MDC_IDC_STAT_EPISODE_TYPE: NORMAL
MDC_IDC_STAT_TACHYTHERAPY_RECENT_DTM_END: NORMAL
MDC_IDC_STAT_TACHYTHERAPY_RECENT_DTM_START: NORMAL
MDC_IDC_STAT_TACHYTHERAPY_TOTAL_DTM_END: NORMAL
MDC_IDC_STAT_TACHYTHERAPY_TOTAL_DTM_START: NORMAL
MONOCYTES # BLD AUTO: 0.7 10E3/UL (ref 0–1.3)
MONOCYTES NFR BLD AUTO: 6 %
MONOS+MACROS NFR FLD MANUAL: 3 %
NEUTROPHILS # BLD AUTO: 10.9 10E3/UL (ref 1.6–8.3)
NEUTROPHILS NFR BLD AUTO: 91 %
NEUTS BAND NFR FLD MANUAL: 65 %
NRBC # BLD AUTO: 0.1 10E3/UL
NRBC BLD AUTO-RTO: 0 /100
NT-PROBNP SERPL-MCNC: 6274 PG/ML (ref 0–900)
OTHER CELLS FLD MANUAL: 3 %
P AXIS - MUSE: NORMAL DEGREES
PATH REV: NORMAL
PLATELET # BLD AUTO: 220 10E3/UL (ref 150–450)
POTASSIUM SERPL-SCNC: 3.9 MMOL/L (ref 3.4–5.3)
PR INTERVAL - MUSE: NORMAL MS
PROT SERPL-MCNC: 5.6 G/DL (ref 6.4–8.3)
QRS DURATION - MUSE: 110 MS
QT - MUSE: 372 MS
QTC - MUSE: 566 MS
R AXIS - MUSE: -53 DEGREES
RBC # BLD AUTO: 3.64 10E6/UL (ref 3.8–5.2)
SODIUM SERPL-SCNC: 137 MMOL/L (ref 135–145)
SYSTOLIC BLOOD PRESSURE - MUSE: NORMAL MMHG
T AXIS - MUSE: 94 DEGREES
VANCOMYCIN SERPL-MCNC: 12 UG/ML
VENTRICULAR RATE- MUSE: 139 BPM
WBC # BLD AUTO: 12 10E3/UL (ref 4–11)

## 2024-08-12 PROCEDURE — 250N000011 HC RX IP 250 OP 636: Performed by: INTERNAL MEDICINE

## 2024-08-12 PROCEDURE — 99233 SBSQ HOSP IP/OBS HIGH 50: CPT | Performed by: INTERNAL MEDICINE

## 2024-08-12 PROCEDURE — 99232 SBSQ HOSP IP/OBS MODERATE 35: CPT | Performed by: STUDENT IN AN ORGANIZED HEALTH CARE EDUCATION/TRAINING PROGRAM

## 2024-08-12 PROCEDURE — 250N000013 HC RX MED GY IP 250 OP 250 PS 637: Performed by: INTERNAL MEDICINE

## 2024-08-12 PROCEDURE — 92526 ORAL FUNCTION THERAPY: CPT | Mod: GN | Performed by: SPEECH-LANGUAGE PATHOLOGIST

## 2024-08-12 PROCEDURE — 93296 REM INTERROG EVL PM/IDS: CPT | Performed by: INTERNAL MEDICINE

## 2024-08-12 PROCEDURE — 258N000003 HC RX IP 258 OP 636: Performed by: STUDENT IN AN ORGANIZED HEALTH CARE EDUCATION/TRAINING PROGRAM

## 2024-08-12 PROCEDURE — 93010 ELECTROCARDIOGRAM REPORT: CPT | Mod: RTG | Performed by: INTERNAL MEDICINE

## 2024-08-12 PROCEDURE — 76705 ECHO EXAM OF ABDOMEN: CPT

## 2024-08-12 PROCEDURE — 200N000001 HC R&B ICU

## 2024-08-12 PROCEDURE — 83880 ASSAY OF NATRIURETIC PEPTIDE: CPT | Performed by: INTERNAL MEDICINE

## 2024-08-12 PROCEDURE — 92610 EVALUATE SWALLOWING FUNCTION: CPT | Mod: GN | Performed by: SPEECH-LANGUAGE PATHOLOGIST

## 2024-08-12 PROCEDURE — 93005 ELECTROCARDIOGRAM TRACING: CPT | Performed by: INTERNAL MEDICINE

## 2024-08-12 PROCEDURE — 999N000157 HC STATISTIC RCP TIME EA 10 MIN

## 2024-08-12 PROCEDURE — 250N000011 HC RX IP 250 OP 636: Performed by: STUDENT IN AN ORGANIZED HEALTH CARE EDUCATION/TRAINING PROGRAM

## 2024-08-12 PROCEDURE — 80202 ASSAY OF VANCOMYCIN: CPT | Performed by: STUDENT IN AN ORGANIZED HEALTH CARE EDUCATION/TRAINING PROGRAM

## 2024-08-12 PROCEDURE — 85025 COMPLETE CBC W/AUTO DIFF WBC: CPT | Performed by: INTERNAL MEDICINE

## 2024-08-12 PROCEDURE — 250N000009 HC RX 250: Performed by: STUDENT IN AN ORGANIZED HEALTH CARE EDUCATION/TRAINING PROGRAM

## 2024-08-12 PROCEDURE — 84520 ASSAY OF UREA NITROGEN: CPT | Performed by: INTERNAL MEDICINE

## 2024-08-12 PROCEDURE — 86140 C-REACTIVE PROTEIN: CPT | Performed by: INTERNAL MEDICINE

## 2024-08-12 PROCEDURE — 93294 REM INTERROG EVL PM/LDLS PM: CPT | Performed by: INTERNAL MEDICINE

## 2024-08-12 PROCEDURE — 93005 ELECTROCARDIOGRAM TRACING: CPT

## 2024-08-12 PROCEDURE — 999N000287 HC ICU ADULT ROUNDING, EACH 10 MINS

## 2024-08-12 PROCEDURE — 250N000013 HC RX MED GY IP 250 OP 250 PS 637: Performed by: STUDENT IN AN ORGANIZED HEALTH CARE EDUCATION/TRAINING PROGRAM

## 2024-08-12 PROCEDURE — 83735 ASSAY OF MAGNESIUM: CPT | Performed by: STUDENT IN AN ORGANIZED HEALTH CARE EDUCATION/TRAINING PROGRAM

## 2024-08-12 PROCEDURE — 99291 CRITICAL CARE FIRST HOUR: CPT | Mod: GC | Performed by: INTERNAL MEDICINE

## 2024-08-12 PROCEDURE — 258N000003 HC RX IP 258 OP 636: Performed by: INTERNAL MEDICINE

## 2024-08-12 RX ORDER — METOPROLOL TARTRATE 50 MG
50 TABLET ORAL 2 TIMES DAILY
Status: DISCONTINUED | OUTPATIENT
Start: 2024-08-13 | End: 2024-08-17 | Stop reason: HOSPADM

## 2024-08-12 RX ORDER — IPRATROPIUM BROMIDE AND ALBUTEROL SULFATE 2.5; .5 MG/3ML; MG/3ML
3 SOLUTION RESPIRATORY (INHALATION) EVERY 4 HOURS PRN
Status: DISCONTINUED | OUTPATIENT
Start: 2024-08-12 | End: 2024-08-17 | Stop reason: HOSPADM

## 2024-08-12 RX ORDER — DIGOXIN 0.25 MG/ML
250 INJECTION INTRAMUSCULAR; INTRAVENOUS ONCE
Status: COMPLETED | OUTPATIENT
Start: 2024-08-12 | End: 2024-08-12

## 2024-08-12 RX ADMIN — METHYLPREDNISOLONE SODIUM SUCCINATE 40 MG: 40 INJECTION, POWDER, FOR SOLUTION INTRAMUSCULAR; INTRAVENOUS at 18:04

## 2024-08-12 RX ADMIN — NYSTATIN 500000 UNITS: 100000 SUSPENSION ORAL at 09:28

## 2024-08-12 RX ADMIN — GABAPENTIN 600 MG: 600 TABLET, FILM COATED ORAL at 13:53

## 2024-08-12 RX ADMIN — SENNOSIDES 1 TABLET: 8.6 TABLET, FILM COATED ORAL at 21:25

## 2024-08-12 RX ADMIN — AMIODARONE HYDROCHLORIDE 1 MG/MIN: 1.8 INJECTION, SOLUTION INTRAVENOUS at 19:23

## 2024-08-12 RX ADMIN — ATORVASTATIN CALCIUM 20 MG: 10 TABLET, FILM COATED ORAL at 21:25

## 2024-08-12 RX ADMIN — SODIUM CHLORIDE, POTASSIUM CHLORIDE, SODIUM LACTATE AND CALCIUM CHLORIDE 500 ML: 600; 310; 30; 20 INJECTION, SOLUTION INTRAVENOUS at 02:20

## 2024-08-12 RX ADMIN — FLUTICASONE FUROATE AND VILANTEROL TRIFENATATE 1 PUFF: 200; 25 POWDER RESPIRATORY (INHALATION) at 08:15

## 2024-08-12 RX ADMIN — SUCRALFATE 1 G: 1 TABLET ORAL at 18:04

## 2024-08-12 RX ADMIN — INSULIN ASPART 2 UNITS: 100 INJECTION, SOLUTION INTRAVENOUS; SUBCUTANEOUS at 12:15

## 2024-08-12 RX ADMIN — APIXABAN 5 MG: 5 TABLET, FILM COATED ORAL at 09:27

## 2024-08-12 RX ADMIN — SUCRALFATE 1 G: 1 TABLET ORAL at 12:14

## 2024-08-12 RX ADMIN — SODIUM BICARBONATE 40 MG: 84 INJECTION INTRAVENOUS at 09:28

## 2024-08-12 RX ADMIN — GABAPENTIN 600 MG: 600 TABLET, FILM COATED ORAL at 21:25

## 2024-08-12 RX ADMIN — SUCRALFATE 1 G: 1 TABLET ORAL at 09:27

## 2024-08-12 RX ADMIN — ACETAMINOPHEN 650 MG: 325 TABLET ORAL at 02:18

## 2024-08-12 RX ADMIN — SENNOSIDES AND DOCUSATE SODIUM 1 TABLET: 50; 8.6 TABLET ORAL at 02:20

## 2024-08-12 RX ADMIN — VANCOMYCIN HYDROCHLORIDE 1500 MG: 5 INJECTION, POWDER, LYOPHILIZED, FOR SOLUTION INTRAVENOUS at 13:53

## 2024-08-12 RX ADMIN — AMIODARONE HYDROCHLORIDE 1 MG/MIN: 1.8 INJECTION, SOLUTION INTRAVENOUS at 06:17

## 2024-08-12 RX ADMIN — POLYETHYLENE GLYCOL 3350 17 G: 17 POWDER, FOR SOLUTION ORAL at 09:28

## 2024-08-12 RX ADMIN — SENNOSIDES 1 TABLET: 8.6 TABLET, FILM COATED ORAL at 09:28

## 2024-08-12 RX ADMIN — AMIODARONE HYDROCHLORIDE 1 MG/MIN: 1.8 INJECTION, SOLUTION INTRAVENOUS at 11:39

## 2024-08-12 RX ADMIN — GABAPENTIN 600 MG: 600 TABLET, FILM COATED ORAL at 09:27

## 2024-08-12 RX ADMIN — APIXABAN 5 MG: 5 TABLET, FILM COATED ORAL at 21:25

## 2024-08-12 RX ADMIN — INSULIN ASPART 1 UNITS: 100 INJECTION, SOLUTION INTRAVENOUS; SUBCUTANEOUS at 08:07

## 2024-08-12 RX ADMIN — DIGOXIN 250 MCG: 0.25 INJECTION INTRAMUSCULAR; INTRAVENOUS at 10:12

## 2024-08-12 RX ADMIN — PIPERACILLIN AND TAZOBACTAM 3.38 G: 3; .375 INJECTION, POWDER, FOR SOLUTION INTRAVENOUS at 02:12

## 2024-08-12 RX ADMIN — PIPERACILLIN AND TAZOBACTAM 3.38 G: 3; .375 INJECTION, POWDER, FOR SOLUTION INTRAVENOUS at 09:31

## 2024-08-12 RX ADMIN — METHYLPREDNISOLONE SODIUM SUCCINATE 40 MG: 40 INJECTION, POWDER, FOR SOLUTION INTRAMUSCULAR; INTRAVENOUS at 04:55

## 2024-08-12 RX ADMIN — PIPERACILLIN AND TAZOBACTAM 3.38 G: 3; .375 INJECTION, POWDER, FOR SOLUTION INTRAVENOUS at 18:05

## 2024-08-12 RX ADMIN — AMIODARONE HYDROCHLORIDE 1 MG/MIN: 1.8 INJECTION, SOLUTION INTRAVENOUS at 01:23

## 2024-08-12 ASSESSMENT — ACTIVITIES OF DAILY LIVING (ADL)
ADLS_ACUITY_SCORE: 30

## 2024-08-12 NOTE — PROGRESS NOTES
HEART CARE CONSULTATON NOTE          Assessment/Recommendations   Assessment:   1.  Acute on chronic hypoxic respiratory failure 2/2 large right-sided pleural effusion with pneumothorax.  E. coli pneumonia (recent).  Chest tube 8/10.    2.  Circulatory shock requiring pressors. Weaning.    3.  Acute on chronic heart failure with preserved ejection fraction: Diuretics on held due to severe hypotension on IV pressor support  4.  Severe aortic stenosis: Status post TAVR February 2024. (Stable gradients)  5.  Paroxysmal atrial fibrillation/flutter with RVR, start amiodarone gtt on 8/11  5.  Sinus node dysfunction status post pacemaker placement     Plan:  1.   Amiodarone continue gtt, discontinue once tolerating oral metoprolol and diltizem.   2.  Give IV digoxin 250 mcg now, reap in 8 hour at 125 mcg IV.    3. Check Qtc on 12 lead ECG.  Qtc: 560 ms.   AVOID any further Qtc prolongation medications, will attempt to dischontinue amiodarone.      4. Continue Eliquis  5.  Currently on pressor support, weaning.  Plan to restart dilt and or metoprolol once BP stable today.          Clinically Significant Risk Factors          # Hypokalemia: Lowest K = 3.3 mmol/L in last 2 days, will replace as needed         # Hypoalbuminemia: Lowest albumin = 2.6 g/dL at 8/12/2024  5:02 AM, will monitor as appropriate        # Hypertension: Noted on problem list                        # Financial/Environmental Concerns: none     # Pacemaker present               History of Present Illness/Subjective    S: Subjectively patient notes improvement in her overall condition.  She feels her breathing is more stable.  Chest tube pain is improving.  She denies any active chest pressure.  No lightheadedness.  She is very weak at this time.  Reviewed that she continues to have atrial flutter with rapid ventricular response.  We added digoxin today for AV blocking.  Her metoprolol and diltiazem are also on hold due to hypotension which are being  "restarted today.  Currently on amiodarone drip which we will try to discontinue due to QTc being prolonged around 560 ms with a QRS duration 110 ms.     I personally reviewed telemetry she remains in atrial flutter with rapid trickle response with a heart rate of 134 to 36 bpm.  No 3-1 to conduction noted     Reviewed Dr. Marmolejo's notes.  Reviewed ICU providers notes.  Discussed with nursing team     ECHO: 4/17/2024  1. Normal left ventricular size and systolic performance with a visually  estimated ejection fraction of 55-60%.  2. There is a bio-prosthetic aortic valve (documented 26 mm Douglas Mai 3  Resilia tissue valve).  Â  Normal aortic valve prosthesis metrics with a mean systolic gradient of 8  mmHg and a peak anterograde velocity of 1.9 m/sec.  Â  No aortic insufficiency is detected.  3. Normal right ventricular size and systolic performance.  4. There is mild left atrial enlargement.     Â When compared to the prior real-time echocardiogram dated 21 February 2024,  the pacing electrodes appear to be new. The findings are otherwise felt to be  fairly similar on both examinations.      Physical Examination      VITALS: /74   Pulse (!) 136   Temp 97.5  F (36.4  C) (Oral)   Resp 18   Ht 1.651 m (5' 5\")   Wt 65.2 kg (143 lb 11.8 oz)   LMP  (LMP Unknown)   SpO2 92%   BMI 23.92 kg/m    BMI: Body mass index is 23.92 kg/m .      Wt Readings from Last 3 Encounters:   08/10/24 65.2 kg (143 lb 11.8 oz)   07/15/24 65.3 kg (143 lb 14.4 oz)   05/22/24 66.2 kg (146 lb)         Intake/Output Summary (Last 24 hours) at 8/12/2024 1038  Last data filed at 8/12/2024 0800      Gross per 24 hour   Intake 3605.56 ml   Output 1490 ml   Net 2115.56 ml      General Appearance:   no distress, upright in bed.     ENT/Mouth: membranes moist, no oral lesions or bleeding gums.      EYES:  no scleral icterus, normal conjunctivae   Neck: no carotid bruits or thyromegaly   Chest/Lungs:   Course lung sounds.  "   Cardiovascular:   Regular, rapid.  Normal first and second heart sounds with no murmurs, rubs, or gallops; the carotid, radial and posterior tibial pulses are intact, Jugular venous pressure 8 , no edema bilaterally                                            Lab Results     Chemistry/lipid CBC Cardiac Enzymes/BNP/TSH/INR        Recent Labs    Lab Test 09/20/23  1136    CHOL 167    HDL 75    LDL 75    TRIG 83              Recent Labs    Lab Test 09/20/23  1136 07/25/22  1026 12/24/21  0756    LDL 75 53 77             Recent Labs    Lab Test 08/12/24  0748 08/12/24  0502   NA  --  137   POTASSIUM  --  3.9   CHLORIDE  --  97*   CO2  --  29   * 351*   BUN  --  26.4*   CR  --  0.85   GFRESTIMATED  --  71   JOEY  --  8.3*             Recent Labs    Lab Test 08/12/24  0502 08/11/24  0400 08/10/24  0937    CR 0.85 0.75 0.93              Recent Labs    Lab Test 02/06/24  1953 09/20/23  1136 05/25/23  0507    A1C 6.2* 6.1* 6.1*                    Recent Labs    Lab Test 08/12/24  0502    WBC 12.0*    HGB 9.0*    HCT 31.0*    MCV 85                  Recent Labs    Lab Test 08/12/24  0502 08/11/24  0400 08/10/24  0937    HGB 9.0* 9.3* 11.4*            Recent Labs    Lab Test 05/24/23  1322 05/24/23  1015 02/08/23  0759    TROPONINI 0.07 0.08 0.09                   Recent Labs   Lab Test 08/12/24  0502 08/10/24  0937 07/13/24  0316 07/12/24  2030 02/19/24  1340 02/06/24  1953 06/02/23  0057 05/24/23  1015 02/08/23  0759   BNP  --   --   --   --   --   --  136* 176* 867*   NTBNPI 6,274* 8,987* 698   < >  --    < >  --   --   --    NTBNP  --   --   --   --  641  --   --   --   --     < > = values in this interval not displayed.           Recent Labs    Lab Test 08/10/24  0937    TSH 2.80              Recent Labs    Lab Test 07/13/24  0703 07/12/24  2030 02/19/24  1340    INR 1.27* 1.45* 1.04          Medical History  Surgical History Family History Social History   Past Medical History        Past Medical History:    Diagnosis Date    Anemia      Aortic stenosis      Aortic valve disorder      Atrial fibrillation (H)      Atrial flutter (H)      Benign neoplasm of adenomatous polyp       large intestine     Chronic constipation      Chronic heart failure with preserved ejection fraction (H) 02/29/2024    Chronic pain syndrome      Congestive heart failure (H)      COPD (chronic obstructive pulmonary disease) (H)       Oxygen at night     Dependence on supplemental oxygen       Oxygen at noc, during the day as needed    Depression      Diabetes mellitus (H)      Dry eye syndrome      Fibromyalgia      Ganglion       left wrist    GERD (gastroesophageal reflux disease)      Hyperlipidemia      Hypertension      Hypokalemia      Infective otitis externa, unspecified       Created by Conversion     Larynx edema      Lung disease      Malignant neoplasm of vulva (H)       Created by Conversion [facility] Annotation: Apr 17 2007  8:24AM - Cammy Bui:  resection per Dr. Alfonso Mane 9/06;  Replacement Utility updated for latest IMO load    Medial epicondylitis      Onychomycosis      Osteoarthritis      Peptic ulcer      Polyneuropathy      Vulvar malignant neoplasm (H)          Past Surgical History         Past Surgical History:   Procedure Laterality Date    BIOPSY BREAST Right      BIOPSY BREAST Right 01/28/2015    BIOPSY BREAST Right 01/28/2015     Procedure: RIGHT BREAST BIOPSY AFTER WIRE LOCALIZATION AT 0940;  Surgeon: Renée Soriano MD;  Location: [facility] [place];  Service:     BIOPSY OF BREAST, INCISIONAL         Description: Incisional Breast Biopsy;  Recorded: 11/13/2007;  Comments: benign    COLONOSCOPY N/A 06/14/2019     Procedure: COLONOSCOPY;  Surgeon: Eduardo Mora MD;  Location: [facility] [place];  Service: Gastroenterology    CV CORONARY ANGIOGRAM N/A 02/08/2024     Procedure: CV CORONARY ANGIOGRAM;  Surgeon: Moises Valencia MD;  Location: [facility] CATH LAB CV    CV LEFT HEART CATH N/A  02/08/2024     Procedure: Left Heart Catheterization;  Surgeon: Moises Valencia MD;  Location: [facility] CV    CV RIGHT HEART CATH MEASUREMENTS RECORDED N/A 02/08/2024     Procedure: Right Heart Catheterization;  Surgeon: Moises Valencia MD;  Location: [facility] CV    CV TRANSCATHETER AORTIC VALVE REPLACEMENT-FEMORAL APPROACH N/A 02/20/2024     Procedure: Transcatheter Aortic Valve Replacement, possible cardiopulmonary bypass, possible surgical intervention;  Surgeon: Moises Valencia MD;  Location: [facility] CV    EP PACEMAKER DEVICE & IMPLANT- HIS LEAD DUAL N/A 3/7/2024     Procedure: Pacemaker Device & Lead Implant - HIS Lead Dual;  Surgeon: Donnell Leon MD;  Location: [facility] CV    ESOPHAGOSCOPY, GASTROSCOPY, DUODENOSCOPY (EGD), COMBINED N/A 11/06/2018     Procedure: ESOPHAGOGASTRODUODENOSCOPY;  Surgeon: Lit Fernando MD;  Location: [facility];  Service:     HYSTERECTOMY        JOINT REPLACEMENT Left       TKA    OR TRANSCATHETER AORTIC VALVE REPLACEMENT, FEMORAL PERCUTANEOUS APPROACH (STANDBY) N/A 02/20/2024     Procedure: OR TRANSCATHETER AORTIC VALVE REPLACEMENT, FEMORAL PERCUTANEOUS APPROACH (STANDBY);  Surgeon: Ishmael Farias MD;  Location: [facility] CV    PICC TRIPLE LUMEN PLACEMENT   01/12/2023          PICC TRIPLE LUMEN PLACEMENT   8/10/2024    IN ABLATE HEART DYSRHYTHM FOCUS         Description: Catheter Ablation Atrial Fibrillation;  Recorded: 07/31/2012;  Comments: 7/24/12 PVI with Dr. Bansal to all 5 pulm veins and CTI fl ablation line as well.    TRANSCATHETER AORTIC-VALVE REPLACEMENT        ZZC SUPRACERV ABD HYSTERECTOMY         Description: Supracervical Hysterectomy;  Proc Date: 01/01/1985;  Comments: some cervix left!; ovaries intact; done for bleeding        Family History         Family History   Problem Relation Age of Onset    Heart Failure Mother      Cancer Other           paternal HX-laryngeal      Alcoholism Sister      No Known Problems Daughter      No Known Problems Maternal Grandmother      No Known Problems Maternal Grandfather      No Known Problems Paternal Grandmother      No Known Problems Paternal Grandfather      No Known Problems Maternal Aunt      No Known Problems Paternal Aunt      Alcoholism Sister      Alcoholism Brother      Alcoholism Father      Cancer Paternal Uncle           Gastric-Alcohol    Cancer Paternal Uncle           gastric-Alcohol    Hereditary Breast and Ovarian Cancer Syndrome No family hx of      Breast Cancer No family hx of      Colon Cancer No family hx of      Endometrial Cancer No family hx of      Ovarian Cancer No family hx of                Social History   Social History            Socioeconomic History    Marital status:        Spouse name: Not on file    Number of children: Not on file    Years of education: Not on file    Highest education level: Not on file   Occupational History    Not on file   Tobacco Use    Smoking status: Some Days       Current packs/day: 0.25       Types: Cigarettes       Passive exposure: Never    Smokeless tobacco: Never    Tobacco comments:       seen by TTS inpatient on 3/31/22   Vaping Use    Vaping status: Never Used   Substance and Sexual Activity    Alcohol use: Yes       Comment: Alcoholic Drinks/day: very little    Drug use: No    Sexual activity: Not on file   Other Topics Concern    Not on file   Social History Narrative    Not on file      Social Determinants of Health           Financial Resource Strain: Low Risk  (12/26/2023)     Financial Resource Strain      Within the past 12 months, have you or your family members you live with been unable to get utilities (heat, electricity) when it was really needed?: No   Food Insecurity: Low Risk  (12/26/2023)     Food Insecurity      Within the past 12 months, did you worry that your food would run out before you got money to buy more?: No      Within the past 12 months,  "did the food you bought just not last and you didn t have money to get more?: No   Transportation Needs: Low Risk  (12/26/2023)     Transportation Needs      Within the past 12 months, has lack of transportation kept you from medical appointments, getting your medicines, non-medical meetings or appointments, work, or from getting things that you need?: No   Physical Activity: Not on file   Stress: Not on file   Social Connections: Not on file   Interpersonal Safety: Low Risk  (4/1/2024)     Interpersonal Safety      Do you feel physically and emotionally safe where you currently live?: Yes      Within the past 12 months, have you been hit, slapped, kicked or otherwise physically hurt by someone?: No      Within the past 12 months, have you been humiliated or emotionally abused in other ways by your partner or ex-partner?: No   Housing Stability: Low Risk  (12/26/2023)     Housing Stability      Do you have housing? : Yes      Are you worried about losing your housing?: No             Medications  Allergies   Current Outpatient Prescriptions   No current outpatient medications on file.           Allergies         Allergies   Allergen Reactions    Celebrex [Celecoxib] Rash       patient had butterfly rash - \"lupus-like\"      Latex Rash             Bautista Jiménez, DO    Date of Service 8/12/2024  "

## 2024-08-12 NOTE — PLAN OF CARE
Problem: Gas Exchange Impaired  Goal: Optimal Gas Exchange  Outcome: Progressing     Problem: Dysrhythmia  Goal: Normalized Cardiac Rhythm  Outcome: Progressing   Goal Outcome Evaluation:    Patient is alert and oriented. Pt denies pain or SOB. Chest tube remains intact. 5ml total serous output on shift. HR increased and sustained in the 130's. Cardiology notified and one time dose of digoxin given. Pt has been A flutter 90- 110's for the remainder of the shift. New SBP parameters made for PO metoprolol and diltiazem. Cardiology wants Pt to be receive and tolerate these PO medications before turning off amio gtt.    Keiry Fox RN

## 2024-08-12 NOTE — PROGRESS NOTES
ICU X Cover    Patient in rapid afib.  Bolused amio and gtt started however consistently high, thus bolused again.  Pt also on beta blocker, diltizem.   Will switch to phenylephrine from levo to avoid further chronotropic stimulus. Place on BIPAP to see it can mitigate any work of breathing.      Jorje Montejo MD

## 2024-08-12 NOTE — PROGRESS NOTES
Patient is on 5L via NC or oxymask. Nebs discontinued per pulm MD due to high HR and clear breath sounds.   BiPAP standby in room.  RT will continue to monitor    RT Masoud

## 2024-08-12 NOTE — PROGRESS NOTES
Patient offered Bipap for the night and she declined.  She is alert and oxygen saturations are upper 90's on 4 lpm.

## 2024-08-12 NOTE — PLAN OF CARE
Goal Outcome Evaluation:      Plan of Care Reviewed With: patient    Overall Patient Progress: no change   Pt slept well overnight.  HR elevated throughout shift.  Additional amio bolus given, and amio gtt increased to 1mg/min.  Levo changed out for Scott.  1 500ml LR bolus given.  Pt responded well to the fluid and HR became slightly more controlled.  Pt on 2-4L O2.  Minimal output from CT overnight.  Pt c/o constipation.  PRN senna administered.  Last BM prior to admission.  Good UO, using purewick. C/o pain near CT and abdomen.  PRN tylenol effective.

## 2024-08-12 NOTE — CONSULTS
BRIEF IR CONSULT NOTE    Referral to IR requesting repositioning of existing right chest tube. Since the referral was placed, Dr. Nori Joseph of the pulmonary service has placed a well-positioned right basilar chest tube and the large pleural effusion has been nearly completely evacuated at post procedure 8/11/2024 CXR. No additional intervention by IR will be necessary at this time.

## 2024-08-12 NOTE — PROGRESS NOTES
2000: Pt HR elevated 140-150.  Spoke with Tele ICU.  Tele ICU states okay to give scheduled PO metoprolol and to administer 2gm IV mg.  See orders.     2143: Pt's HR remains elevated.  Tele ICU states to give additional amio bolus and increase rate to 1mg/min.  See orders.     2330: Pt's HR back to 130s. Levo restarted.  Tele ICU notified. Changing from levo to deon. Start Bipap  See orders.     0100: HR remains 120-130s.  Tele ICU notified.  No new orders at this time.

## 2024-08-12 NOTE — PROGRESS NOTES
Patient was only able to tolerate the Bipap until 0200.  Patient resting comfortably and heart rate in the 90's now after medication changes and Bipap.  Oxygen saturations in the upper 90's on 2 lpm.  RT to continue to follow and closely monitor.

## 2024-08-12 NOTE — PROGRESS NOTES
[facility]    Progress Note - Hospitalist Service       Date of Admission:  8/10/2024    Assessment & Plan   Mary Kay Tejada is a 74 year old female admitted on 8/10/2024. She has a history of HTN, CAD, HFpEF, Afib on DOAC, aortic stenosis s/p TAVR, s/p pacemaker, COPD, tobacco user and is admitted for SOB and found to have acute hypoxic respiratory failure and Hypotension.    Acute hypoxic respiratory failure   Large right pleural effusion/resolving  Pneumonia  COPD exacerbation  Empyema  Pt presented with SOB. Pt was recently admitted from 7/12-7/15 with acute hypercarbic respiratory failure , pneumonia and Covid19 viral infection.CT scan showed RUL collapse, right effusion. Sputum Cx (+) E coli. Developed pneumothorax post right side thoracentesis. Improvement of pneumothorax in serial CXR.  CT scan during this admission  showed debris in airway. Chronic collapse right upper lobe. Atelectasis RLL. Pleural fluid aerobic culture grown gram positive cocci. Speech therapy assessed the pt, appreciate input.   - oxygen supplement   - vanc and zosyn   - solumedrol 40 mg daily   - hold nebulizer until rate is  controlled   - chest tube   - Follow up cultures    - continue pulmonary toiletting      A fib/ flutter with RVR  Volume up status  Pt is developed a flutter with RVR. Cardiology consulted appertiate recommendation.  - continue amiodarone  - continue eliquis       Diet: Fluid restriction 1800 ML FLUID  Pureed Diet (level 4) Thin Liquids (level 0)    DVT Prophylaxis: DOAC  Chairez Catheter: Not present  Fluids: PO  Lines: PRESENT      PICC 08/10/24 Triple Lumen Right Brachial vein medial-Site Assessment: WDL      Cardiac Monitoring: ACTIVE order. Indication: Tachyarrhythmias, acute (48 hours)  Code Status: Full Code      Clinically Significant Risk Factors        # Hypokalemia: Lowest K = 3.3 mmol/L in last 2 days, will replace as needed    # Hypercalcemia: corrected calcium is >10.1,  will monitor as appropriate    # Hypoalbuminemia: Lowest albumin = 2.6 g/dL at 8/12/2024  5:02 AM, will monitor as appropriate     # Hypertension: Noted on problem list               # Financial/Environmental Concerns: none   # Pacemaker present             Disposition Plan      Expected Discharge Date: 08/15/2024    Discharge Delays: IV Medication - consider oral or Home Infusion  Destination: home with help/services;home with family  Discharge Comments: icu status, HR control on amio gtt, deon gtt, steroids, chest tube, BiPap intermittently        The patient's care was discussed with the Attending Physician, Dr. Carmelita Buckley .    Rosaura Johnson MD  Hospitalist Service  [facility]  Securely message with [employer] (more info)  Text page via [email or web address] Paging/Directory   ______________________________________________________________________    Interval History   No new acute event overnight. Pt is feeling better today. She had some rest last night. She had breakfast this morning.    Physical Exam   Vital Signs: Temp: 97.6  F (36.4  C) Temp src: Oral BP: 93/62 Pulse: 116   Resp: 20 SpO2: 90 % O2 Device: Nasal cannula Oxygen Delivery: 5 LPM  Weight: 143 lbs 11.84 oz    Gen: awake, alert, mild respiratory distress   HEENT: pale conjunctiva, dry mucosa,   Neck: no thyromegaly, masses or JVD  Lungs: decrease breath sounds right base, wheezes both HT  CV: irregular, tachycardic, no murmurs or gallops appreciated  Abdomen: soft, NT, BS wnl  Ext: no edema  Neuro: awake, alert        Medical Decision Making       Please see A&P for additional details of medical decision making.      Data     I have personally reviewed the following data over the past 24 hrs:    12.0 (H)  \   9.0 (L)   / 220     137 97 (L) 26.4 (H) /  176 (H)   3.9 29 0.85 \     ALT: 173 (H) AST: 97 (H) AP: 188 (H) TBILI: 0.3   ALB: 2.6 (L) TOT PROTEIN: 5.6 (L) LIPASE: N/A     Trop: N/A BNP: 6,274 (H)     Procal: N/A CRP: 158.00 (H) Lactic  Acid: N/A         Imaging results reviewed over the past 24 hrs:   Recent Results (from the past 24 hour(s))   XR Chest 1 View    Narrative    EXAM: XR CHEST 1 VIEW  LOCATION: [facility]  DATE: 08/11/2024    INDICATION: New chest tube placement.  COMPARISON: 08/10/2024 CXR and CT chest.      Impression    IMPRESSION: Interval placement right basilar pleural drainage catheter. Previously seen large right pleural effusion has been nearly completely evacuated and the mid and lower right lung have partially reexpanded. No pneumothorax. Left lung clear. Mild   cardiac enlargement. Transcatheter aortic valve replacement. Pacer anterior left chest wall with lead tips in the RA and RV. Right PICC tip RA/SVC junction.

## 2024-08-12 NOTE — PROGRESS NOTES
"   08/12/24 0909   Appointment Info   Signing Clinician's Name / Credentials (SLP) Johnny Cabrera MA [facility] SLP   General Information   Onset of Illness/Injury or Date of Surgery 08/10/24   Referring Physician Rayo Mendoza MD   Pertinent History of Current Problem Per Provider notes: \"Mary Kay Tejada is a 74 year old female with history of HTN, CAD, HFpEF, afib on DOAC, aortic stenosis s/p TAVR, s/p pacemaker, COPD, tobacco user.   Recent admission 7/12 to 7/17 with acute hypercarbic respiratory failure, pneumonia and COVI19 viral infection. CT scan showed RUL collapse, right effusion. Sputum Cx (+) E coli. Developed pneumothorax post right side thoracentesis. Improvement of pneumothorax in serial CXR. Discharged home on O2 supplementation 3 LPM, and Abx and follow up in clinic.   Presents to ED on 8/10/2024 for evaluation of shortness of breath.   Patient was in moderate respiratory distress, hypotensive, hypoxic.   O2 supplementation NRB mask.   Labs showed elevated LFTs, elevated WBC, negative procalcitonin. Acute on chronic respiratory acidosis. CXR showed large right pleural effusion.   ED physician placed a right side chest tube. Follow up CXR showed mild decrease right side effusion, tip of chest tube towards right apex. Had a follow up chest CT scan, study showed RUL atelectasis, debris in right lower lobe bronchus, moderate right effusion.   Admitted to ICU , treated for pneumonia, steroids added for COPD exacerbation.\" Clinical swallow eval completed per provider orders.   General Observations Upright in room, cooperative.   Type of Evaluation   Type of Evaluation Swallow Evaluation   Oral Motor   Oral Musculature generally intact   Structural Abnormalities none present   Mucosal Quality good   Dentition (Oral Motor)   Dentition (Oral Motor) dental appliance/dentures   Dental Appliance/Denture (Oral Motor) upper;other (see comments)  (edentulous lower, reports she does not wear her lower dentures, even " for PO intake)   Facial Symmetry (Oral Motor)   Facial Symmetry (Oral Motor) WNL   Lip Function (Oral Motor)   Lip Range of Motion (Oral Motor) WNL   Lip Strength (Oral Motor) WNL   Tongue Function (Oral Motor)   Tongue Coordination/Speed (Oral Motor) WNL   Tongue ROM (Oral Motor) WNL   Jaw Function (Oral Motor)   Jaw Function (Oral Motor) WNL   Cough/Swallow/Gag Reflex (Oral Motor)   Volitional Throat Clear/Cough (Oral Motor) WNL   Volitional Swallow (Oral Motor) WNL   Vocal Quality/Secretion Management (Oral Motor)   Vocal Quality (Oral Motor) WNL   General Swallowing Observations   Past History of Dysphagia Patient assessed at bedside by SLP in June 2023. Recommended regular/thin diet and discharged from SLP services.   Current Diet/Method of Nutritional Intake (General Swallowing Observations, NIS) thin liquids (level 0);pureed (dysphagia pureed) (level 4)   Swallowing Evaluation Clinical swallow evaluation   Clinical Swallow Evaluation   Feeding Assistance no assistance needed   Clinical Swallow Evaluation Textures Trialed thin liquids;pureed;solid foods   Clinical Swallow Eval: Thin Liquid Texture Trial   Mode of Presentation, Thin Liquids cup;self-fed   Volume of Liquid or Food Presented >4 oz thin water   Oral Phase of Swallow WFL   Pharyngeal Phase of Swallow intact   Diagnostic Statement No overt s/s of aspiration observed. Cannot rule out silent aspiration.   Clinical Swallow Evaluation: Puree Solid Texture Trial   Mode of Presentation, Puree spoon;self-fed   Volume of Puree Presented 4 spoonfuls   Oral Phase, Puree WFL   Pharyngeal Phase, Puree intact   Diagnostic Statement No overt s/s of aspiration observed.   Clinical Swallow Evaluation: Solid Food Texture Trial   Mode of Presentation self-fed   Volume Presented 1/2 bryan cracker   Oral Phase other (see comments)  (Mildly extended mastication)   Pharyngeal Phase intact   Diagnostic Statement No overt s/s of aspiration observed.   Esophageal Phase of  Swallow   Patient reports or presents with symptoms of esophageal dysphagia Yes   Esophageal comments Hx of GERD   Swallowing Recommendations   Diet Consistency Recommendations thin liquids (level 0);regular diet   Supervision Level for Intake patient independent   Mode of Delivery Recommendations bolus size, small   Monitoring/Assistance Required (Eating/Swallowing) stop eating activities when fatigue is present   Recommended Feeding/Eating Techniques (Swallow Eval) maintain upright sitting position for eating   Medication Administration Recommendations, Swallowing (SLP) As tolerated   Instrumental Assessment Recommendations VFSS (videofluoroscopic swallowing study)   Clinical Impression   Criteria for Skilled Therapeutic Interventions Met (SLP Eval) Yes, treatment indicated   SLP Diagnosis Suspected pharyngeal dysphagia   Risks & Benefits of therapy have been explained evaluation/treatment results reviewed;care plan/treatment goals reviewed;risks/benefits reviewed;current/potential barriers reviewed;participants voiced agreement with care plan;participants included;patient   Clinical Impression Comments   CSE completed today per provider orders. Patient presents with suspected mild pharyngeal dysphagia in setting of COPD exacerbation and new PNA. Oral motor evaluation unremarkable. Baseline, patient only wears upper dentures. Trials of thin liquids, puree textures, and hard solids completed at bedside. Oral phase c/b adequate bolus acceptance and closure, mildly extended mastication of hard solids, and good oral clearance. No overt s/s of aspiration observed across the session. Silent aspiration cannot be ruled out at the bedside.     Given patient's history of COPD, new COPD exacerbation, previous concern of aspiration during prior hospitalizations, and new treatment for PNA, recommend VFSS to rule out potential silent aspiration as patient is at an elevated risk. Recommend regular diet and thin liquids with  patient self-selecting softer solids. Patient should be fully upright and alert for intake, take small bites/sips, use a slow rate of intake. SLP will continue to follow for dysphagia management.     SLP Total Evaluation Time   Eval: oral/pharyngeal swallow function, clinical swallow Minutes ([id number]) 16   SLP Discharge Planning   SLP Plan VFSS   SLP Discharge Recommendation home with home health   SLP Rationale for DC Rec Question acute on chronic pharyngeal dysphagia

## 2024-08-12 NOTE — PLAN OF CARE
Problem: Adult Inpatient Plan of Care  Goal: Readiness for Transition of Care  Outcome: Progressing     Problem: Gas Exchange Impaired  Goal: Optimal Gas Exchange  Outcome: Progressing      A&O, forgetful.  Remains in Aflutter, amio gtt initiated per cards d/t sustained  following bedside bronch and chest tube replacement.  MAP goal >65 - levo gtt being titrated.  On 4L O2 via n/c - down from 6L oxymask this AM.  Chest tube to -20 suction.  Some mild pain at site.  Denies wanting PRN meds.  On pureed thin liquid diet.  No s/s aspiration since initiating thin liquids.  SLP will follow up again tomorrow.  ACHS BG.  Purewick in place with good output.  Family visited this evening and supportive.  Call light within reach.

## 2024-08-12 NOTE — PROGRESS NOTES
[facility]    Medicine Progress Note - Hospitalist Service    Date of Admission:  8/10/2024    Assessment & Plan      Mary Kay Tejada is a 74 year old female admitted on 8/10/2024. She has a pmhx of paroxysmal atrial flutter on apixaban, COPD, hyperlipidemia, diabetes mellitus type 2 with neuropathy, reflux, dizziness, tobacco use, fibromyalgia and chronic pain syndrome, prior pleural effusions and most recent admission in July found with E. coli pneumonia and a right sided pleural effusion which was consistent with a transudate and negative cultures.      Presentation of shortness of breath progressive over several days/week with acute worsening on day of admission.  Arrived to ER hypotensive MAP 60s and tachycardia 150s. Found with large right sided pleural effusion and respiratory acidosis and s/p bedside pigtail catheter chest tube placement in ER. On 6L then on bipap. HR then 70s. Fluid studies sent. BNP 8987, wbc 13.1, hgb 11.4. Started on zosyn, continued. Given lasix 60mg iv in total (40mg x1 and then 20mg q8 hrs). Continued with q8 hrs gentle diuresis.  Chest tube was noted to have its tip at the right apex with plan for IR evaluation and RE-positioning.  IR consulted but unavailable so plan was for potential transfer; overnight had low blood pressures ultimately started on norepinephrine, transferred to ICU, additional vasopressin was added. ICU/Pulmonary following. On 8/11 Pulm/ICU repositioned Chest Tube directly at bedside along with a subsequent bronchoscopy (showed oral candidiasis, hyperemic mucosa, and mild thick secretions, no endobronchial lesions).  Patient went into in aflutter and started on amiodarone drip and Cardiology consulted.  Speech consulted.  Respiratory aerobic cultures now growing E.Coli and Strep Pneumo.     On 8/12 cardiology giving IV digoxin and plan to repeat at a lower dose in 8 hours, continue amiodarone drip and discontinue that once the patient  is able to tolerate oral metoprolol diltiazem -these have been UNHELD to resume tomorrow but with hold parameters; defer any further modification to Cardiology. Also, Video-study w/ Speech ordered. 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 Appreciate pulm + cardiology + speech  Plan:  -Appreciate pulmonary service and repositioning of the chest tube and pressor management  -Apixaban resumed after procedure and bronch  -amiodarone started; Cardiology consulted, appreciate   - amio and stop when tolerating metoprolol and diltiazem   - see above, unheld to resume tomorrow w/ parameters, further modification defer to expertise of cardiology  -diuresis when stable, not yet, defer to cardiology  --Scheduled DuoNebs and RT   -Saline nebulizers  -Dysphagia diet  -Speech consulted   - discussed; Video-study w/ Speech ordered   -Continue vancomycin per pharmacy dosing  -Continue Zosyn, every 8 hours  - follow cultures, now strep pneumo and e coli, follow for S&R  - follow pending studies and bronch of RUL and RLL  -I's and O's and weights  -steroids per pulmonary   -fluid Restrict to 1.8 L     paroxysmal atrial flutter on apixaban  Very high heart rate to 150s on ER arrival but on admit was in the 70s  Overnight hypotensive requiring pressors, now in aflutter  A- on arrival 150s but on admit was 70s-80s; antihypertensives and diuresis were held as the patient ultimately required pressors overnight. On 8/11 in atrial flutter and amiodarone was started.  Cardiology consulted. giving IV digoxin and plan to repeat at a lower dose in 8 hours  P:  - continue amiodarone drip and  discontinue that once the patient is able to tolerate oral metoprolol diltiazem -these have been UNHELD to resume tomorrow but with hold parameters; defer any further modification to Cardiology     Hyperlipidemia  A/p- cont statin    diabetes mellitus type 2 with neuropathy  A/p- hold jardiance, ldssi and hypoglycemia protocol on admit  - on 8/12/2024 INCREASE to Medium dose ssi    reflux  A/p- stable, auto-sub ppi     fibromyalgia and chronic pain syndrome  A/p- hold prior pain meds w/c for respiratory depression; tylenol for now    Hx of TAVR  Hx of HFpEF  A/p- see primary issue, continue diuresis as above for now, I/Os and weights; cardiology consulted now pt is in aflutter on amio drip; will nee diuresis when hemodynamically stable    Prolonged Qtc  A/p- appreciate cardiology following, avoid qt-prolonging medications    Chronic constipation  A/p- stable, continue prn PTA meds            Diet: Fluid restriction 1800 ML FLUID  NPO per Anesthesia Guidelines for Procedure/Surgery Except for: Meds    DVT Prophylaxis: DOAC, Pneumatic Compression Devices, and Ambulate every shift  Chairez Catheter: Not present  Lines: PRESENT      PICC 08/10/24 Triple Lumen Right Brachial vein medial-Site Assessment: WDL      Cardiac Monitoring: ACTIVE order. Indication: Tachyarrhythmias, acute (48 hours)  Code Status: Full Code      Clinically Significant Risk Factors        # Hypokalemia: Lowest K = 3.3 mmol/L in last 2 days, will replace as needed       # Hypoalbuminemia: Lowest albumin = 2.6 g/dL at 8/12/2024  5:02 AM, will monitor as appropriate       # Hypertension: Noted on problem list               # Financial/Environmental Concerns: none   # Pacemaker present             Disposition Plan     Medically Ready for Discharge: Anticipated in 2-4 Days             Rayo Mendoza MD  Hospitalist Service  [facility]  Securely message with [email or web address] (more info)  Text page via [employer] Paging/[employer]    ______________________________________________________________________    Interval History   See updated summary  Pt has no new pain or symptoms  Actually for first time since admission is feeling 'better'  Has no new concerns  We discussed care plans, updated her on strep pneumo and e coli and also cardiology recs and amio drip and slowly resuming her prior meds if able  Discussed w/ bedside ICU rn  Updated SW    Physical Exam   Vital Signs: Temp: 98.1  F (36.7  C) Temp src: Oral BP: 104/61 Pulse: 111   Resp: 18 SpO2: (!) 87 % O2 Device: Nasal cannula Oxygen Delivery: 5 LPM  Weight: 143 lbs 11.84 oz    General: alert, oriented and in NAD, smiling  Pulmonary: coarse breath sounds, decreased at bases, on 5L NC   CVS: regular, aflutter on tele, no murmurs, rubs, or gallops; no blatant jugular venous distention; no extremity edema and extremities are warm to the touch  GI: soft, nontender, BS+, no rebound or guarding, no conspicuous organomegaly   Neuro: nonfocal, moves all extremities of own volition  Psych: appropriate       Medical Decision Making       49 MINUTES SPENT BY ME on the date of service doing chart review, history, exam, documentation & further activities per the note.      Data   ------------------------- PAST 24 HR DATA REVIEWED -----------------------------------------------    I have personally reviewed the following data over the past 24 hrs:    12.0 (H)  \   9.0 (L)   / 220     137 97 (L) 26.4 (H) /  286 (H)   3.9 29 0.85 \     ALT: 173 (H) AST: 97 (H) AP: 188 (H) TBILI: 0.3   ALB: 2.6 (L) TOT PROTEIN: 5.6 (L) LIPASE: N/A     Trop: N/A BNP: 6,274 (H)     Procal: N/A CRP: 158.00 (H) Lactic Acid: N/A         Imaging results reviewed over the past 24 hrs:   No results found for this or any previous visit (from the past 24 hour(s)).

## 2024-08-12 NOTE — PHARMACY-VANCOMYCIN DOSING SERVICE
Pharmacy Vancomycin Note  Date of Service 2024  Patient's  1950   74 year old, female    Indication: Community Acquired Pneumonia  Day of Therapy:  Day2  Current vancomycin regimen:  1500 mg IV q24h  Current vancomycin monitoring method: AUC  Current vancomycin therapeutic monitoring goal: 400-600 mg*h/L    InsightRX Prediction of Current Vancomycin Regimen  Loading dose: N/A  Regimen: 1500 mg IV every 24 hours.  Start time: 14:39 on 2024  Exposure target: AUC24 (range)400-600 mg/L.hr   AUC24,ss: 496 mg/L.hr  Probability of AUC24 > 400: 90 %  Ctrough,ss: 13.7 mg/L  Probability of Ctrough,ss > 20: 11 %  Probability of nephrotoxicity (Lodise MODESTO ): 9 %    Current estimated CrCl = Estimated Creatinine Clearance: 59.8 mL/min (based on SCr of 0.85 mg/dL).    Creatinine for last 3 days  8/10/2024:  9:37 AM Creatinine 0.93 mg/dL  2024:  4:00 AM Creatinine 0.75 mg/dL  2024:  5:02 AM Creatinine 0.85 mg/dL    Recent Vancomycin Levels (past 3 days)  2024:  5:02 AM Vancomycin 12.0 ug/mL    Vancomycin IV Administrations (past 72 hours)                     vancomycin (VANCOCIN) 1,500 mg in 0.9% NaCl 250 mL intermittent infusion (mg) 1,500 mg New Bag 24 1439    vancomycin (VANCOCIN) 1,250 mg in 0.9% NaCl 250 mL intermittent infusion (mg) 1,250 mg New Bag 08/10/24 1428                    Nephrotoxins and other renal medications (From now, onward)      Start     Dose/Rate Route Frequency Ordered Stop    24 1400  vancomycin (VANCOCIN) 1,500 mg in 0.9% NaCl 250 mL intermittent infusion         1,500 mg  166.7 mL/hr over 90 Minutes Intravenous EVERY 24 HOURS 24 0800      08/10/24 2030  vasopressin (VASOSTRICT) 20 Units in sodium chloride 0.9 % 100 mL standard conc infusion         2.4 Units/hr  12 mL/hr  Intravenous CONTINUOUS 08/10/24 2003      08/10/24 183  piperacillin-tazobactam (ZOSYN) 3.375 g vial to attach to  mL bag        Note to Pharmacy: For [facility], [facility] and  "[facility]: For Zosyn-naive patients, use the \"Zosyn initial dose + extended infusion\" order panel.    3.375 g  over 240 Minutes Intravenous EVERY 8 HOURS 08/10/24 1245      08/10/24 1730  norepinephrine (LEVOPHED) 4 mg in  mL infusion PREMIX         0.01-0.6 mcg/kg/min × 63.5 kg  2.4-142.9 mL/hr  Intravenous CONTINUOUS 08/10/24 1729      08/10/24 1330  [Held by provider]  furosemide (LASIX) injection 20 mg        (On hold since Sat 8/10/2024 at 1716 until manually unheld; held by Rayo Mendoza MDHold Reason: Change in Vitals)    20 mg  over 1-3 Minutes Intravenous EVERY 8 HOURS 08/10/24 1245                 Contrast Orders - past 72 hours (72h ago, onward)      Start     Dose/Rate Route Frequency Stop    08/10/24 1200  iopamidol (ISOVUE-370) solution 100 mL         100 mL Intravenous ONCE 08/10/24 1201            Interpretation of levels and current regimen:  Vancomycin level is reflective of -600    Has serum creatinine changed greater than 50% in last 72 hours: No    Urine output:  good urine output    Renal Function: Stable    InsightRX Prediction of Planned New Vancomycin Regimen  Loading dose: N/A  Regimen: 1500 mg IV every 24 hours.  Start time: 14:39 on 08/12/2024  Exposure target: AUC24 (range)400-600 mg/L.hr   AUC24,ss: 496 mg/L.hr  Probability of AUC24 > 400: 90 %  Ctrough,ss: 13.7 mg/L  Probability of Ctrough,ss > 20: 11 %  Probability of nephrotoxicity (Lodise MODESTO 2009): 9 %  Plan:  Continue Current Dose  Vancomycin monitoring method: AUC  Vancomycin therapeutic monitoring goal: 400-600 mg*h/L  Pharmacy will check vancomycin levels as appropriate in 1-3 Days.  Serum creatinine levels will be ordered daily for the first week of therapy and at least twice weekly for subsequent weeks.    Caitlin Coe, PharmD   "

## 2024-08-13 ENCOUNTER — APPOINTMENT (OUTPATIENT)
Dept: CT IMAGING | Facility: CLINIC | Age: 74
DRG: 871 | End: 2024-08-13
Attending: INTERNAL MEDICINE
Payer: COMMERCIAL

## 2024-08-13 ENCOUNTER — PATIENT OUTREACH (OUTPATIENT)
Dept: GERIATRIC MEDICINE | Facility: CLINIC | Age: 74
End: 2024-08-13

## 2024-08-13 ENCOUNTER — APPOINTMENT (OUTPATIENT)
Dept: SPEECH THERAPY | Facility: CLINIC | Age: 74
DRG: 871 | End: 2024-08-13
Payer: COMMERCIAL

## 2024-08-13 LAB
ALBUMIN SERPL BCG-MCNC: 2.8 G/DL (ref 3.5–5.2)
ALP SERPL-CCNC: 171 U/L (ref 40–150)
ALT SERPL W P-5'-P-CCNC: 149 U/L (ref 0–50)
ANION GAP SERPL CALCULATED.3IONS-SCNC: 5 MMOL/L (ref 7–15)
AST SERPL W P-5'-P-CCNC: 54 U/L (ref 0–45)
BACTERIA BRONCH: ABNORMAL
BACTERIA SPT CULT: ABNORMAL
BACTERIA SPT CULT: ABNORMAL
BILIRUB DIRECT SERPL-MCNC: <0.2 MG/DL (ref 0–0.3)
BILIRUB SERPL-MCNC: 0.3 MG/DL
BUN SERPL-MCNC: 26.6 MG/DL (ref 8–23)
CA-I BLD-MCNC: 5.1 MG/DL (ref 4.4–5.2)
CALCIUM SERPL-MCNC: 9.2 MG/DL (ref 8.8–10.4)
CHLORIDE SERPL-SCNC: 101 MMOL/L (ref 98–107)
CREAT SERPL-MCNC: 0.68 MG/DL (ref 0.51–0.95)
EGFRCR SERPLBLD CKD-EPI 2021: >90 ML/MIN/1.73M2
ERYTHROCYTE [DISTWIDTH] IN BLOOD BY AUTOMATED COUNT: 17.3 % (ref 10–15)
GLUCOSE BLDC GLUCOMTR-MCNC: 162 MG/DL (ref 70–99)
GLUCOSE BLDC GLUCOMTR-MCNC: 217 MG/DL (ref 70–99)
GLUCOSE BLDC GLUCOMTR-MCNC: 231 MG/DL (ref 70–99)
GLUCOSE BLDC GLUCOMTR-MCNC: 284 MG/DL (ref 70–99)
GLUCOSE SERPL-MCNC: 197 MG/DL (ref 70–99)
GRAM STAIN RESULT: ABNORMAL
HCO3 SERPL-SCNC: 33 MMOL/L (ref 22–29)
HCT VFR BLD AUTO: 31.9 % (ref 35–47)
HGB BLD-MCNC: 9.3 G/DL (ref 11.7–15.7)
MAGNESIUM SERPL-MCNC: 2.5 MG/DL (ref 1.7–2.3)
MCH RBC QN AUTO: 24.6 PG (ref 26.5–33)
MCHC RBC AUTO-ENTMCNC: 29.2 G/DL (ref 31.5–36.5)
MCV RBC AUTO: 84 FL (ref 78–100)
PATH REPORT.COMMENTS IMP SPEC: NORMAL
PATH REPORT.COMMENTS IMP SPEC: NORMAL
PATH REPORT.FINAL DX SPEC: NORMAL
PATH REPORT.FINAL DX SPEC: NORMAL
PATH REPORT.GROSS SPEC: NORMAL
PATH REPORT.GROSS SPEC: NORMAL
PATH REPORT.MICROSCOPIC SPEC OTHER STN: NORMAL
PATH REPORT.MICROSCOPIC SPEC OTHER STN: NORMAL
PLATELET # BLD AUTO: 174 10E3/UL (ref 150–450)
POTASSIUM SERPL-SCNC: 4.4 MMOL/L (ref 3.4–5.3)
PROT SERPL-MCNC: 5.9 G/DL (ref 6.4–8.3)
RBC # BLD AUTO: 3.78 10E6/UL (ref 3.8–5.2)
SODIUM SERPL-SCNC: 139 MMOL/L (ref 135–145)
WBC # BLD AUTO: 8.6 10E3/UL (ref 4–11)

## 2024-08-13 PROCEDURE — 250N000013 HC RX MED GY IP 250 OP 250 PS 637: Performed by: INTERNAL MEDICINE

## 2024-08-13 PROCEDURE — 250N000011 HC RX IP 250 OP 636: Performed by: STUDENT IN AN ORGANIZED HEALTH CARE EDUCATION/TRAINING PROGRAM

## 2024-08-13 PROCEDURE — 120N000004 HC R&B MS OVERFLOW

## 2024-08-13 PROCEDURE — 88305 TISSUE EXAM BY PATHOLOGIST: CPT | Mod: 26 | Performed by: PATHOLOGY

## 2024-08-13 PROCEDURE — 88108 CYTOPATH CONCENTRATE TECH: CPT | Mod: 26 | Performed by: PATHOLOGY

## 2024-08-13 PROCEDURE — 80048 BASIC METABOLIC PNL TOTAL CA: CPT | Performed by: STUDENT IN AN ORGANIZED HEALTH CARE EDUCATION/TRAINING PROGRAM

## 2024-08-13 PROCEDURE — 250N000009 HC RX 250: Performed by: INTERNAL MEDICINE

## 2024-08-13 PROCEDURE — 999N000157 HC STATISTIC RCP TIME EA 10 MIN

## 2024-08-13 PROCEDURE — 85027 COMPLETE CBC AUTOMATED: CPT | Performed by: STUDENT IN AN ORGANIZED HEALTH CARE EDUCATION/TRAINING PROGRAM

## 2024-08-13 PROCEDURE — 82248 BILIRUBIN DIRECT: CPT

## 2024-08-13 PROCEDURE — 250N000011 HC RX IP 250 OP 636: Performed by: INTERNAL MEDICINE

## 2024-08-13 PROCEDURE — 94660 CPAP INITIATION&MGMT: CPT

## 2024-08-13 PROCEDURE — 250N000013 HC RX MED GY IP 250 OP 250 PS 637: Performed by: STUDENT IN AN ORGANIZED HEALTH CARE EDUCATION/TRAINING PROGRAM

## 2024-08-13 PROCEDURE — 92526 ORAL FUNCTION THERAPY: CPT | Mod: GN

## 2024-08-13 PROCEDURE — 99232 SBSQ HOSP IP/OBS MODERATE 35: CPT | Performed by: STUDENT IN AN ORGANIZED HEALTH CARE EDUCATION/TRAINING PROGRAM

## 2024-08-13 PROCEDURE — 82330 ASSAY OF CALCIUM: CPT | Performed by: INTERNAL MEDICINE

## 2024-08-13 PROCEDURE — 71250 CT THORAX DX C-: CPT

## 2024-08-13 PROCEDURE — 99232 SBSQ HOSP IP/OBS MODERATE 35: CPT | Mod: GC | Performed by: INTERNAL MEDICINE

## 2024-08-13 PROCEDURE — 99233 SBSQ HOSP IP/OBS HIGH 50: CPT | Performed by: INTERNAL MEDICINE

## 2024-08-13 PROCEDURE — 83735 ASSAY OF MAGNESIUM: CPT | Performed by: STUDENT IN AN ORGANIZED HEALTH CARE EDUCATION/TRAINING PROGRAM

## 2024-08-13 PROCEDURE — 250N000012 HC RX MED GY IP 250 OP 636 PS 637: Performed by: INTERNAL MEDICINE

## 2024-08-13 PROCEDURE — 250N000009 HC RX 250: Performed by: STUDENT IN AN ORGANIZED HEALTH CARE EDUCATION/TRAINING PROGRAM

## 2024-08-13 PROCEDURE — 94640 AIRWAY INHALATION TREATMENT: CPT

## 2024-08-13 RX ORDER — DIGOXIN 0.25 MG/ML
125 INJECTION INTRAMUSCULAR; INTRAVENOUS ONCE
Status: COMPLETED | OUTPATIENT
Start: 2024-08-13 | End: 2024-08-13

## 2024-08-13 RX ORDER — PREDNISONE 20 MG/1
40 TABLET ORAL DAILY
Status: DISCONTINUED | OUTPATIENT
Start: 2024-08-13 | End: 2024-08-16

## 2024-08-13 RX ORDER — CEFTRIAXONE 2 G/1
2 INJECTION, POWDER, FOR SOLUTION INTRAMUSCULAR; INTRAVENOUS EVERY 24 HOURS
Status: DISCONTINUED | OUTPATIENT
Start: 2024-08-13 | End: 2024-08-16

## 2024-08-13 RX ORDER — PANTOPRAZOLE SODIUM 40 MG/1
40 TABLET, DELAYED RELEASE ORAL
Status: DISCONTINUED | OUTPATIENT
Start: 2024-08-14 | End: 2024-08-17 | Stop reason: HOSPADM

## 2024-08-13 RX ORDER — DIGOXIN 125 MCG
125 TABLET ORAL DAILY
Status: DISCONTINUED | OUTPATIENT
Start: 2024-08-14 | End: 2024-08-17 | Stop reason: HOSPADM

## 2024-08-13 RX ADMIN — METOPROLOL TARTRATE 50 MG: 50 TABLET, FILM COATED ORAL at 11:17

## 2024-08-13 RX ADMIN — DIGOXIN 125 MCG: 0.25 INJECTION INTRAMUSCULAR; INTRAVENOUS at 14:53

## 2024-08-13 RX ADMIN — SUCRALFATE 1 G: 1 TABLET ORAL at 12:07

## 2024-08-13 RX ADMIN — PIPERACILLIN AND TAZOBACTAM 3.38 G: 3; .375 INJECTION, POWDER, FOR SOLUTION INTRAVENOUS at 10:05

## 2024-08-13 RX ADMIN — GABAPENTIN 600 MG: 600 TABLET, FILM COATED ORAL at 21:08

## 2024-08-13 RX ADMIN — AMIODARONE HYDROCHLORIDE 1 MG/MIN: 1.8 INJECTION, SOLUTION INTRAVENOUS at 07:30

## 2024-08-13 RX ADMIN — APIXABAN 5 MG: 5 TABLET, FILM COATED ORAL at 21:07

## 2024-08-13 RX ADMIN — SENNOSIDES 1 TABLET: 8.6 TABLET, FILM COATED ORAL at 08:12

## 2024-08-13 RX ADMIN — GABAPENTIN 600 MG: 600 TABLET, FILM COATED ORAL at 08:12

## 2024-08-13 RX ADMIN — AMIODARONE HYDROCHLORIDE 1 MG/MIN: 1.8 INJECTION, SOLUTION INTRAVENOUS at 01:41

## 2024-08-13 RX ADMIN — APIXABAN 5 MG: 5 TABLET, FILM COATED ORAL at 08:15

## 2024-08-13 RX ADMIN — PREDNISONE 40 MG: 20 TABLET ORAL at 10:05

## 2024-08-13 RX ADMIN — DILTIAZEM HYDROCHLORIDE 120 MG: 120 CAPSULE, COATED, EXTENDED RELEASE ORAL at 08:11

## 2024-08-13 RX ADMIN — DIGOXIN 125 MCG: 0.25 INJECTION INTRAMUSCULAR; INTRAVENOUS at 10:05

## 2024-08-13 RX ADMIN — SUCRALFATE 1 G: 1 TABLET ORAL at 17:23

## 2024-08-13 RX ADMIN — GABAPENTIN 600 MG: 600 TABLET, FILM COATED ORAL at 14:52

## 2024-08-13 RX ADMIN — ATORVASTATIN CALCIUM 20 MG: 10 TABLET, FILM COATED ORAL at 21:08

## 2024-08-13 RX ADMIN — PIPERACILLIN AND TAZOBACTAM 3.38 G: 3; .375 INJECTION, POWDER, FOR SOLUTION INTRAVENOUS at 01:41

## 2024-08-13 RX ADMIN — SUCRALFATE 1 G: 1 TABLET ORAL at 07:38

## 2024-08-13 RX ADMIN — CEFTRIAXONE SODIUM 2 G: 2 INJECTION, POWDER, FOR SOLUTION INTRAMUSCULAR; INTRAVENOUS at 17:23

## 2024-08-13 RX ADMIN — FLUTICASONE FUROATE AND VILANTEROL TRIFENATATE 1 PUFF: 200; 25 POWDER RESPIRATORY (INHALATION) at 08:15

## 2024-08-13 RX ADMIN — METHYLPREDNISOLONE SODIUM SUCCINATE 40 MG: 40 INJECTION, POWDER, FOR SOLUTION INTRAMUSCULAR; INTRAVENOUS at 05:11

## 2024-08-13 RX ADMIN — SENNOSIDES 1 TABLET: 8.6 TABLET, FILM COATED ORAL at 21:08

## 2024-08-13 RX ADMIN — SODIUM BICARBONATE 40 MG: 84 INJECTION INTRAVENOUS at 07:37

## 2024-08-13 RX ADMIN — Medication 1 SPRAY: at 08:15

## 2024-08-13 RX ADMIN — IPRATROPIUM BROMIDE AND ALBUTEROL SULFATE 3 ML: .5; 3 SOLUTION RESPIRATORY (INHALATION) at 13:33

## 2024-08-13 ASSESSMENT — ACTIVITIES OF DAILY LIVING (ADL)
ADLS_ACUITY_SCORE: 39
ADLS_ACUITY_SCORE: 39
ADLS_ACUITY_SCORE: 30
ADLS_ACUITY_SCORE: 31
ADLS_ACUITY_SCORE: 30
ADLS_ACUITY_SCORE: 31
ADLS_ACUITY_SCORE: 39
ADLS_ACUITY_SCORE: 30
ADLS_ACUITY_SCORE: 36
ADLS_ACUITY_SCORE: 31
ADLS_ACUITY_SCORE: 36
ADLS_ACUITY_SCORE: 30
ADLS_ACUITY_SCORE: 31
ADLS_ACUITY_SCORE: 30
ADLS_ACUITY_SCORE: 31
ADLS_ACUITY_SCORE: 30
ADLS_ACUITY_SCORE: 30

## 2024-08-13 NOTE — PROGRESS NOTES
TRANSITIONS OF CARE (LETICIA) LOG    LETICIA tasks should be completed by the CC within one (1) business day of notification of each transition. Follow up contact with member is required after return to their usual care setting.  Note:  If CC finds out about the transitions fifteen (15) days or more after the member has returned to their usual care setting, no LETICIA log is needed. However, the CC should check in with the member to discuss the transition process, any changes needed to the care plan and document it in a case note.     Member Name:  Mary Kay Tejada [facility] [id number]   Product: [facility] Care Coordinator Contact:  Samantha Alexandra RN, N Agency/County/Care System: [facility]   Transition Communication Actions from Care Management Contact   Transition #1   Notification Date: 8/13/24 Transition Date:   8/10/24 Transition From: Home     Is this the member s usual care setting?               yes Transition To: [facility]   Transition Type:  Unplanned    Documentation from conversation with the member/responsible party, provider, discharging and receiving facility:   Date: 8/13/24: Received notification of admission to hospital with dx of Sepsis, elevated lactate   CC contacted Hospital /discharge planner via the [employer] Transitional Care Hand-In Process, with community care plan included.  CC reached out to member regarding transition and left a voice message.  Reviewed and update care plan as needed.  Notified community service providers and placed services Homemaking PCA on hold as needed.  Transition log initiated.   PCP, Cammy Bui, notified of hospitalization via EMR.     Transition #2   Notification Date: 8/20/24 Transition Date:   8/17/24 Transition From: [facility]     Is this the member s usual care setting?               no Transition To: [facility]  "[facility]   Transition Type:  Planned    Documentation from conversation with the member/responsible party, provider, discharging and receiving facility:   Member transferred to [facility] for VATS procedure per chart review.       Transition #3   Notification Date: 9/10/24 Transition Date:   09/9/24 Transition From: [facility]     Is this the member s usual care setting?               no Transition To: Home   Transition Type:  Planned    Documentation from conversation with the member/responsible party, provider, discharging and receiving facility:   Date: 9/11/24:  Attempted to reach Mary Kay, no answer and left voice mail.    9/12/24: Called Cynthia since no call back from Mary Kay.      Received notification of transition to home.  CC contacted  Cynthia, daughter-in-law   and reviewed discharge summary.  Member has a follow-up appointment with PCP in 7 days: No. Enrolled in hospice with [facility].    Member has had a change in condition: Yes: Mary Kay is bedridden, needs help with all ADLs. Using supplemental oxygen at  2-2.5L/min continuously. Appetite is poor and eats only soft to pureed foods. Per Cynthia, hands are \"clutch up\" and needs help with feeding.  Mary Kay needs 2 people to assist with ADLs. Cynthia, 2 nieces and Mary Kay's partner are providing cares.     Mary Kay has HHA from [facility] 2x/week. Nurse visit 2x/week.    Home visit needed: No at this time. CC will auth temporary PCA hrs at 6 hrs daily x45 days. Current PCA 5 hrs daily. Total hours per day will be 11 hrs. Start date TBD. Nidaniel will work the additional PCA hrs and need to complete paper work with home care. Cynthia will update CC and CC will submit auth request to [facility].  Support Plan reviewed and updated.  The following home based services Homemaking PCA were resumed.  New referrals placed: Yes: PCA temp increase in hrs.  Transition log completed.   PCP, Ramya, " Cammy ARAMBULA, notified of transition back to home via EMR.                                 *RETURN TO USUAL CARE SETTING: *Complete tasks below when the member is discharging TO their usual care setting within one (1) business day of notification..      For situations where the Care Coordinator is notified of the discharge prior to the date of discharge, the Care Coordinator must follow up with the member or designated representative to confirm that discharge actually occurred and discuss required LETICIA tasks as outlined in the LETICIA Instructions.  (This includes situations where it may be a  new  usual care setting for the member. (i.e., a community member who decides upon permanent nursing home placement following hospitalization and rehab).    Discuss with Member/Responsible Party:    Check  Yes  - if the member, family member and/or SNF/facility staff manages the following:    If  No  provide explanation in the comments section.          Date completed: 9/12/2024 Communicated with member or their designated representative about the following:  care transition process; about changes to the member s health status; plan of care updates; education about transitions and how to prevent unplanned transitions/readmissions    Four Pillars for Optimal Transition:    Check  Yes  - if the member, family member and/or SNF/facility staff manages the following:    If  No  provide explanation in the comments section.          []  Yes     [x]  No Does the member have a follow-up appointment scheduled with primary care or specialist? (Mental health hospitalizations--the appt. should be w/in 7 days)              For mental health hospitalizations:  []  Yes     []  No     Does the member have a follow-up appointment scheduled with a mental health practitioner within 7 days of discharge?  []  Yes     [x]  No     Has a medication review been completed with member? If no, refer to PCP, home care nurse, MTM, pharmacist  [x]  Yes     []  No      Can the member manage their medications or is there a system in place to manage medications (e.g. home care set-up)?         [x]  Yes     []  No     Can the member verbalize warning signs and symptoms to watch for and how to respond?  [x]  Yes     []  No     Does the member have a copy of and understand their discharge instructions?  If no, assist to obtain copy of discharge instructions, review discharge instructions, and assist to contact PCP to discuss questions about their recent hospitalization.  [x]  Yes     []  No     Does the member have adequate food, housing and transportation?  If no, add goal and discuss additional supports available to the member                                                                                                                                                                                 [x]  Yes     []  No     Is the member safe in their home?  If no, document needs and support provided                                                                                                                                                                          []  Yes     [x]  No     Are there any concerns of vulnerability, abuse, or neglect?  If yes, document concerns and actions taken by Care Coordinator as a mandated                                                                                                                                                                              [x]  Yes     []  No     Does the member use a Personal Health Care Record?  Check  Yes  if visit summary, discharge summary, and/or healthcare summary are being used as a PHR.                                                                                                                                                                                  [x]  Yes     []  No     Have you reviewed the discharge summary with the member? If  No  provide explanation in comments.  [x]  Yes      []  No     Have you updated the member s care plan/support plan? Add new diagnosis, medications, treatments, goals & interventions, as applicable. If No, provide explanation in comments.    Comments:           Notes from conversation with the member/responsible party, provider, discharging and receiving facility (as applicable):     Med not reviewed. Cynthia is at work. Mary Kay has hospice nurse vixit 2x/week.            Samantha Alexandra RN, PHN   [facility]  [phone]

## 2024-08-13 NOTE — PLAN OF CARE
Pt A&Ox4, able to make needs known. 3-5L NC overnight, O2> 90%. Denies pain, N/V, CP. Abd US performed at bedside this shift, see results. Chest tube insertion site remains c/d/I, minimal output this shift. Aflutter on telle. Pt encouraged to shift weight throughout night, shifts self. Did not get out of bed this shift. Purewick in place. No BM this shift. Amio remains infusing, see MAR. HR <100 majority of the shift.       Problem: Adult Inpatient Plan of Care  Goal: Absence of Hospital-Acquired Illness or Injury  Intervention: Prevent Skin Injury  Recent Flowsheet Documentation  Taken 8/13/2024 0400 by Rocio Burrell RN  Body Position:   position changed independently   weight shifting  Taken 8/13/2024 0200 by Rocio Burrell RN  Body Position:   position changed independently   weight shifting  Taken 8/13/2024 0000 by Rocio Burrell RN  Body Position:   position changed independently   weight shifting  Taken 8/12/2024 2200 by Rocio Burrell RN  Body Position: position changed independently  Taken 8/12/2024 2000 by Rocio Burrell RN  Body Position: position changed independently     Problem: Adult Inpatient Plan of Care  Goal: Absence of Hospital-Acquired Illness or Injury  Intervention: Prevent Infection  Recent Flowsheet Documentation  Taken 8/13/2024 0400 by Rocio Burrell RN  Infection Prevention: hand hygiene promoted  Taken 8/13/2024 0000 by Rocio Burrell RN  Infection Prevention: hand hygiene promoted  Taken 8/12/2024 2000 by Rocio Burrell RN  Infection Prevention: hand hygiene promoted    Rocio Burrell RN on 8/13/2024 at 5:31 AM

## 2024-08-13 NOTE — PROGRESS NOTES
"Patient remains on 3-5L-NC and Saturation is low 90's. Neb PRN given today for SOB. BS clear upper lobes and diminished bases. BIPAP for PRN was discontinued. Chest tube in right lung. Plan: RT is flowing.   08/13/24 1602   Vital Signs   Resp 20   Pulse 95   Pulse Rate Source Monitor   Oxygen Therapy   SpO2 91 %   O2 Device Nasal cannula   Oxygen Delivery 5 LPM   Breath Sounds   Breath Sounds All Fields   All Lung Fields Breath Sounds clear;diminished     Blood pressure 101/56, pulse 95, temperature 97.8  F (36.6  C), temperature source Oral, resp. rate 20, height 1.651 m (5' 5\"), weight 65.2 kg (143 lb 11.8 oz), SpO2 91%, not currently breastfeeding.   "

## 2024-08-13 NOTE — PROGRESS NOTES
[facility]    Progress Note - Hospitalist Service       Date of Admission:  8/10/2024    Assessment & Plan   Mary Kay Tejada is a 74 year old female admitted on 8/10/2024. She has a history of HTN, CAD, HFpEF, Afib on eliquis, aortic stenosis s/p TAVR, s/p pacemaker , COPD, tobacco user and is admitted for for SOB and found to have large right pleural effusion with empyema, pneumonia and hypotension.    Pulmonary:  1) Empyema with large effusion  2) Emphysema  3) COPD on Home O2 (3L) with current exacerbation  4) Pneumonia  -discontinue solumedrol 40 mg BID and start prednisone 40 mg PO daily  - discontinue PRN Bipap  -Follow up bronchoscopy results  -PRN nebs (moved from standing nebs due to afib with RVR  -Chest tube to remain in place and chest CT scan today  -Wean O2 for sats 88-92%, currently on 5L  -Will eventually need diuresis.   - vanc and zosyn for pneumonia  -Follow up culture results    C/V:  1) Afib with RVR  2) Hypotension, ?Septic versus rate-related  -Phenylephrine for MAP<65  -Discontinue Amiodarone drip  -Diltiazem and Metoprolol standing  -Trial of digoxin  -Appreciate cardiology input.   -Anticoagulated with Apixaban.      GI:  1) Transaminitis, unclear etiology.  RUQ US is normal.  - Trend LFTs.  - discontinue Zofran   - continue bowel regimen     Renal:  No issues     Neuro:  No issues     ID:  1) E.Coli and strep pneumoniae in sputum  2) empyema with gram positive in pleural fluid  -Zosyn/Vanc. Once speciated, can discontinue Vanc of strep.  -Will need effusion drained completely            Diet: Fluid restriction 1800 ML FLUID  Pureed Diet (level 4) Thin Liquids (level 0)    DVT Prophylaxis: DOAC  Chairez Catheter: Not present  Fluids: PO  Lines: PRESENT      PICC 08/10/24 Triple Lumen Right Brachial vein medial-Site Assessment: WDL      Cardiac Monitoring: ACTIVE order. Indication: Tachyarrhythmias, acute (48 hours)  Code Status: Full Code      Clinically  Significant Risk Factors              # Hypoalbuminemia: Lowest albumin = 2.6 g/dL at 8/12/2024  5:02 AM, will monitor as appropriate     # Hypertension: Noted on problem list               # Financial/Environmental Concerns: none   # Pacemaker present             Disposition Plan      Expected Discharge Date: 08/15/2024    Discharge Delays: IV Medication - consider oral or Home Infusion  Destination: home with help/services;home with family  Discharge Comments: icu status, HR control on amio gtt, deon gtt, steroids, chest tube, BiPap intermittently        The patient's care was discussed with the Attending Physician, Dr. Carmelita Buckley .    Rosaura Johnson MD PGY-1  Hospitalist Service  [facility]  Securely message with [employer] (more info)  Text page via [employer] Paging/Directory   ______________________________________________________________________    Interval History   No new acute event overnight.      Physical Exam   Vital Signs: Temp: 98.6  F (37  C) Temp src: Oral BP: 93/58 Pulse: (!) 145   Resp: 18 SpO2: 91 % O2 Device: Nasal cannula Oxygen Delivery: 5 LPM  Weight: 143 lbs 11.84 oz    Gen: awake, alert, mild respiratory distress   HEENT: pale conjunctiva, dry mucosa,   Neck: no thyromegaly, masses or JVD  Lungs: decrease breath sounds right base, wheezes both HT  CV: irregular, tachycardic, no murmurs or gallops appreciated  Abdomen: soft, NT, BS wnl  Ext: no edema  Neuro: awake, alert     Medical Decision Making       Please see A&P for additional details of medical decision making.      Data     I have personally reviewed the following data over the past 24 hrs:    8.6  \   9.3 (L)   / 174     139 101 26.6 (H) /  162 (H)   4.4 33 (H) 0.68 \       Imaging results reviewed over the past 24 hrs:   Recent Results (from the past 24 hour(s))   US Abdomen Limited    Narrative    EXAM: US ABDOMEN LIMITED  LOCATION: [facility]  DATE: 8/12/2024    INDICATION: Abnormal  LFTs.  COMPARISON: CT 8/10/2024  TECHNIQUE: Limited abdominal ultrasound.    FINDINGS:  Study is limited by a chest tube and a compression dressing on the right side. Within limitations, findings are as follows.    GALLBLADDER: Limited evaluation is within normal limits. No gallstones, wall thickening, or pericholecystic fluid. Negative sonographic Ospina's sign.    BILE DUCTS: No biliary dilatation. The common duct measures 7 mm.    LIVER: Normal parenchyma with smooth contour. No focal mass.    RIGHT KIDNEY: Not seen due to bowel gas.    PANCREAS: The pancreas is largely obscured by overlying gas.    No ascites.      Impression    IMPRESSION:  1.  Normal limited abdominal ultrasound within limitations detailed above.

## 2024-08-13 NOTE — PROGRESS NOTES
HEART CARE CONSULTATON NOTE        Assessment/Recommendations   Assessment:   1.  Acute on chronic hypoxic respiratory failure 2/2 large right-sided pleural effusion with pneumothorax.  E. coli pneumonia (recent).  Chest tube 8/10.    2.  Circulatory shock required pressors on admission.      3.  Acute on chronic heart failure with preserved ejection fraction  4.  Severe aortic stenosis: Status post TAVR February 2024. (Stable gradients)  5.  Paroxysmal atrial fibrillation/flutter with RVR, start amiodarone gtt on 8/11  5.  Sinus node dysfunction status post pacemaker placement    Plan:  1.  Discontinue Amiodarone gtt given prolongation in QT.  2.  Resumeing metoprolol and diltizem.   2.  Give IV digoxin 125 mcg now, repeat in 6 hour at 125 mcg IV.   Digoxin oral tomorrow.   3. Qtc: 560 ms.   AVOID any further Qtc prolongation medications, will attempt to dischontinue amiodarone.      4. Continue Eliquis  5.  Currently off pressor support      Clinically Significant Risk Factors              # Hypoalbuminemia: Lowest albumin = 2.6 g/dL at 8/12/2024  5:02 AM, will monitor as appropriate       # Hypertension: Noted on problem list                     # Financial/Environmental Concerns: none       # Pacemaker present           History of Present Illness/Subjective    S: Currently patient denies any palpitations or chest pressure.  Chest tube pain noted but stable.  Review of telemetry demonstrates she is back in atrial flutter rapid ventricular response.  She had 3-1 conduction of atrial flutter on personal review of telemetry yesterday with adjustments of medication.  Will resume digoxin.  Need to continue to give patient metoprolol and diltiazem.  Would discontinue amiodarone drip due to prolongation of QTc interval (has not been effective in controlling rate).      Review of telemetry demonstrates patient has been in atrial flutter with variable conduction.  Morning into 2 1 conduction with elevated heart rates.   "Asymptomatic elevated heart rates.  Would discontinue amiodarone      ECHO: 4/17/2024  1. Normal left ventricular size and systolic performance with a visually  estimated ejection fraction of 55-60%.  2. There is a bio-prosthetic aortic valve (documented 26 mm Douglas Mai 3  Resilia tissue valve).  Â  Normal aortic valve prosthesis metrics with a mean systolic gradient of 8  mmHg and a peak anterograde velocity of 1.9 m/sec.  Â  No aortic insufficiency is detected.  3. Normal right ventricular size and systolic performance.  4. There is mild left atrial enlargement.     Â When compared to the prior real-time echocardiogram dated 21 February 2024,  the pacing electrodes appear to be new. The findings are otherwise felt to be  fairly similar on both examinations.     Physical Examination     VITALS: BP 93/58 (BP Location: Left arm)   Pulse (!) 145   Temp 98.6  F (37  C) (Oral)   Resp 18   Ht 1.651 m (5' 5\")   Wt 65.2 kg (143 lb 11.8 oz)   LMP  (LMP Unknown)   SpO2 91%   BMI 23.92 kg/m    BMI: Body mass index is 23.92 kg/m .  Wt Readings from Last 3 Encounters:   08/10/24 65.2 kg (143 lb 11.8 oz)   07/15/24 65.3 kg (143 lb 14.4 oz)   05/22/24 66.2 kg (146 lb)       Intake/Output Summary (Last 24 hours) at 8/12/2024 1038  Last data filed at 8/12/2024 0800  Gross per 24 hour   Intake 3605.56 ml   Output 1490 ml   Net 2115.56 ml     General Appearance:   no distress, upright in bed.     ENT/Mouth: membranes moist, no oral lesions or bleeding gums.      EYES:  no scleral icterus, normal conjunctivae   Neck: no carotid bruits or thyromegaly   Chest/Lungs:   Course lung sounds.    Cardiovascular:   Regular, rapid.  No edema.                               Lab Results    Chemistry/lipid CBC Cardiac Enzymes/BNP/TSH/INR   Recent Labs   Lab Test 09/20/23  1136   CHOL 167   HDL 75   LDL 75   TRIG 83     Recent Labs   Lab Test 09/20/23  1136 07/25/22  1026 12/24/21  0756   LDL 75 53 77     Recent Labs   Lab Test " 08/12/24  0748 08/12/24  0502   NA  --  137   POTASSIUM  --  3.9   CHLORIDE  --  97*   CO2  --  29   * 351*   BUN  --  26.4*   CR  --  0.85   GFRESTIMATED  --  71   JOEY  --  8.3*     Recent Labs   Lab Test 08/12/24  0502 08/11/24  0400 08/10/24  0937   CR 0.85 0.75 0.93     Recent Labs   Lab Test 02/06/24  1953 09/20/23  1136 05/25/23  0507   A1C 6.2* 6.1* 6.1*          Recent Labs   Lab Test 08/12/24  0502   WBC 12.0*   HGB 9.0*   HCT 31.0*   MCV 85        Recent Labs   Lab Test 08/12/24  0502 08/11/24  0400 08/10/24  0937   HGB 9.0* 9.3* 11.4*    Recent Labs   Lab Test 05/24/23  1322 05/24/23  1015 02/08/23  0759   TROPONINI 0.07 0.08 0.09     Recent Labs   Lab Test 08/12/24  0502 08/10/24  0937 07/13/24  0316 07/12/24  2030 02/19/24  1340 02/06/24  1953 06/02/23  0057 05/24/23  1015 02/08/23  0759   BNP  --   --   --   --   --   --  136* 176* 867*   NTBNPI 6,274* 8,987* 698   < >  --    < >  --   --   --    NTBNP  --   --   --   --  641  --   --   --   --     < > = values in this interval not displayed.     Recent Labs   Lab Test 08/10/24  0937   TSH 2.80     Recent Labs   Lab Test 07/13/24  0703 07/12/24  2030 02/19/24  1340   INR 1.27* 1.45* 1.04        Medical History  Surgical History Family History Social History   Past Medical History:   Diagnosis Date    Anemia     Aortic stenosis     Aortic valve disorder     Atrial fibrillation (H)     Atrial flutter (H)     Benign neoplasm of adenomatous polyp     large intestine     Chronic constipation     Chronic heart failure with preserved ejection fraction (H) 02/29/2024    Chronic pain syndrome     Congestive heart failure (H)     COPD (chronic obstructive pulmonary disease) (H)     Oxygen at night     Dependence on supplemental oxygen     Oxygen at noc, during the day as needed    Depression     Diabetes mellitus (H)     Dry eye syndrome     Fibromyalgia     Ganglion     left wrist    GERD (gastroesophageal reflux disease)     Hyperlipidemia      Hypertension     Hypokalemia     Infective otitis externa, unspecified     Created by Conversion     Larynx edema     Lung disease     Malignant neoplasm of vulva (H)     Created by Conversion [facility] Annotation: Apr 17 2007  8:24AM Cammy Shabazz:  resection per Dr. Alfonso Mane 9/06;  Replacement Utility updated for latest IMO load    Medial epicondylitis     Onychomycosis     Osteoarthritis     Peptic ulcer     Polyneuropathy     Vulvar malignant neoplasm (H)      Past Surgical History:   Procedure Laterality Date    BIOPSY BREAST Right     BIOPSY BREAST Right 01/28/2015    BIOPSY BREAST Right 01/28/2015    Procedure: RIGHT BREAST BIOPSY AFTER WIRE LOCALIZATION AT 0940;  Surgeon: Renée Soriano MD;  [place] [facility] [place]:     BIOPSY OF BREAST, INCISIONAL      Description: Incisional Breast Biopsy;  Recorded: 11/13/2007;  Comments: benign    COLONOSCOPY N/A 06/14/2019    Procedure: COLONOSCOPY;  Surgeon: Eduardo Mora MD;  [place] [facility] [place]  Service: Gastroenterology    CV CORONARY ANGIOGRAM N/A 02/08/2024    Procedure: CV CORONARY ANGIOGRAM;  Surgeon: Moises Valencia MD;  Location: [facility]    CV LEFT HEART CATH N/A 02/08/2024    Procedure: Left Heart Catheterization;  Surgeon: Moises Valencia MD;  Location: [facility]    CV RIGHT HEART CATH MEASUREMENTS RECORDED N/A 02/08/2024    Procedure: Right Heart Catheterization;  Surgeon: Moises Valencia MD;  Location: [facility]    CV TRANSCATHETER AORTIC VALVE REPLACEMENT-FEMORAL APPROACH N/A 02/20/2024    Procedure: Transcatheter Aortic Valve Replacement, possible cardiopulmonary bypass, possible surgical intervention;  Surgeon: Moises Valencia MD;  Location: [facility]    EP PACEMAKER DEVICE & IMPLANT- HIS LEAD DUAL N/A 3/7/2024    Procedure: Pacemaker Device & Lead Implant - HIS Lead Dual;  Surgeon: Donnell Leon MD;  Location: [facility] CV     ESOPHAGOSCOPY, GASTROSCOPY, DUODENOSCOPY (EGD), COMBINED N/A 11/06/2018    Procedure: ESOPHAGOGASTRODUODENOSCOPY;  Surgeon: Lit Fernando MD;  Location: [facility] OR;  Service:     HYSTERECTOMY      JOINT REPLACEMENT Left     TKA    OR TRANSCATHETER AORTIC VALVE REPLACEMENT, FEMORAL PERCUTANEOUS APPROACH (STANDBY) N/A 02/20/2024    Procedure: OR TRANSCATHETER AORTIC VALVE REPLACEMENT, FEMORAL PERCUTANEOUS APPROACH (STANDBY);  Surgeon: Ishmael Farias MD;  Location: [facility] CATH LAB CV    PICC TRIPLE LUMEN PLACEMENT  01/12/2023         PICC TRIPLE LUMEN PLACEMENT  8/10/2024    WI ABLATE HEART DYSRHYTHM FOCUS      Description: Catheter Ablation Atrial Fibrillation;  Recorded: 07/31/2012;  Comments: 7/24/12 PVI with Dr. Gardiner and nilay to all 5 pulm veins and CTI fl ablation line as well.    TRANSCATHETER AORTIC-VALVE REPLACEMENT      ZZC SUPRACERV ABD HYSTERECTOMY      Description: Supracervical Hysterectomy;  Proc Date: 01/01/1985;  Comments: some cervix left!; ovaries intact; done for bleeding     Family History   Problem Relation Age of Onset    Heart Failure Mother     Cancer Other         paternal HX-laryngeal     Alcoholism Sister     No Known Problems Daughter     No Known Problems Maternal Grandmother     No Known Problems Maternal Grandfather     No Known Problems Paternal Grandmother     No Known Problems Paternal Grandfather     No Known Problems Maternal Aunt     No Known Problems Paternal Aunt     Alcoholism Sister     Alcoholism Brother     Alcoholism Father     Cancer Paternal Uncle         Gastric-Alcohol    Cancer Paternal Uncle         gastric-Alcohol    Hereditary Breast and Ovarian Cancer Syndrome No family hx of     Breast Cancer No family hx of     Colon Cancer No family hx of     Endometrial Cancer No family hx of     Ovarian Cancer No family hx of         Social History     Socioeconomic History    Marital status:      Spouse name: Not on file    Number of  children: Not on file    Years of education: Not on file    Highest education level: Not on file   Occupational History    Not on file   Tobacco Use    Smoking status: Some Days     Current packs/day: 0.25     Types: Cigarettes     Passive exposure: Never    Smokeless tobacco: Never    Tobacco comments:     seen by TTS inpatient on 3/31/22   Vaping Use    Vaping status: Never Used   Substance and Sexual Activity    Alcohol use: Yes     Comment: Alcoholic Drinks/day: very little    Drug use: No    Sexual activity: Not on file   Other Topics Concern    Not on file   Social History Narrative    Not on file     Social Determinants of Health     Financial Resource Strain: Low Risk  (12/26/2023)    Financial Resource Strain     Within the past 12 months, have you or your family members you live with been unable to get utilities (heat, electricity) when it was really needed?: No   Food Insecurity: Low Risk  (12/26/2023)    Food Insecurity     Within the past 12 months, did you worry that your food would run out before you got money to buy more?: No     Within the past 12 months, did the food you bought just not last and you didn t have money to get more?: No   Transportation Needs: Low Risk  (12/26/2023)    Transportation Needs     Within the past 12 months, has lack of transportation kept you from medical appointments, getting your medicines, non-medical meetings or appointments, work, or from getting things that you need?: No   Physical Activity: Not on file   Stress: Not on file   Social Connections: Not on file   Interpersonal Safety: Low Risk  (4/1/2024)    Interpersonal Safety     Do you feel physically and emotionally safe where you currently live?: Yes     Within the past 12 months, have you been hit, slapped, kicked or otherwise physically hurt by someone?: No     Within the past 12 months, have you been humiliated or emotionally abused in other ways by your partner or ex-partner?: No   Housing Stability: Low Risk  " (12/26/2023)    Housing Stability     Do you have housing? : Yes     Are you worried about losing your housing?: No         Medications  Allergies   No current outpatient medications on file.        Allergies   Allergen Reactions    Celebrex [Celecoxib] Rash     patient had butterfly rash - \"lupus-like\"      Latex Rash         Bautista Jiménez, DO    "

## 2024-08-13 NOTE — PROGRESS NOTES
"/58 (BP Location: Left arm)   Pulse 91   Temp 97.3  F (36.3  C) (Axillary)   Resp 18   Ht 1.651 m (5' 5\")   Wt 65.2 kg (143 lb 11.8 oz)   LMP  (LMP Unknown)   SpO2 92%   BMI 23.92 kg/m        The BIPAP has remained at the bedside in standby so far this shift. She does not appear to be in respiratory distress (requiring BIPAP) and the HR has been (one exception in charting) in the low 90's. RT will follow as directed.  "

## 2024-08-13 NOTE — PROGRESS NOTES
[facility]    Medicine Progress Note - Hospitalist Service    Date of Admission:  8/10/2024    Assessment & Plan      Mary Kay Tejada is a 74 year old female admitted on 8/10/2024. She has a pmhx of paroxysmal atrial flutter on apixaban, COPD, hyperlipidemia, diabetes mellitus type 2 with neuropathy, reflux, dizziness, tobacco use, fibromyalgia and chronic pain syndrome, prior pleural effusions and most recent admission in July found with E. coli pneumonia and a right sided pleural effusion which was consistent with a transudate and negative cultures.      Presentation of shortness of breath progressive over several days/week with acute worsening on day of admission.  Arrived to ER hypotensive MAP 60s and tachycardia 150s. Found with large right sided pleural effusion and respiratory acidosis and s/p bedside pigtail catheter chest tube placement in ER. On 6L then on bipap. HR then 70s. Fluid studies sent. BNP 8987, wbc 13.1, hgb 11.4. Started on zosyn, continued. Given lasix 60mg iv in total (40mg x1 and then 20mg q8 hrs). Continued with q8 hrs gentle diuresis.  Chest tube was noted to have its tip at the right apex with plan for IR evaluation and RE-positioning.  IR consulted but unavailable so plan was for potential transfer; overnight had low blood pressures ultimately started on norepinephrine, transferred to ICU, additional vasopressin was added. ICU/Pulmonary following. On 8/11 Pulm/ICU repositioned Chest Tube directly at bedside along with a subsequent bronchoscopy (showed oral candidiasis, hyperemic mucosa, and mild thick secretions, no endobronchial lesions).  Patient went into in aflutter and started on amiodarone drip and Cardiology consulted.  Speech consulted.  Respiratory aerobic cultures now growing E.Coli and Strep Pneumo. On 8/12 started digoxin, attempt to wean off of amiodarone.  Increased insulin sliding scale. Steroids per pulmonary, completing 8/13/2024.    Today,  discontinuing the amiodarone drip given the prolonged QT, resumed metoprolol diltiazem with parameters.  Likely transition to oral digoxin tomorrow.  Avoid QTc prolonging medications.  Remains off pressors. Adding PT and OT for eventual dispo. Discussed w/ speech, video study tomorrow. Will need diuresis when further stable. CT to be done today to check if effusion completely drained.    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 Appreciate pulm + cardiology + speech  Plan:  -Appreciate pulmonary service and repositioning of the chest tube and pressor management  -Apixaban resumed after procedure and bronch  - for 8/13/2024 -discontinuing the amiodarone drip given the prolonged QT, resumed metoprolol diltiazem with parameters.defer to expertise of cardiology  -diuresis when stable, not yet, defer to cardiology  --Scheduled DuoNebs and RT   -Saline nebulizers  -Dysphagia diet  -Speech consulted   - discussed; Video-study w/ Speech scheduled for 8/14.   -Continue vancomycin per pharmacy dosing  -Continue Zosyn, every 8 hours  - follow cultures, now strep pneumo and e coli, follow for S&R  - follow studies and bronch of RUL and RLL  -I's and O's and weights  -steroids per pulmonary, completing 8/13/2024   -fluid Restrict to 1.8 L     paroxysmal atrial flutter on apixaban  A- on arrival 150s but on admit was 70s-80s; antihypertensives and diuresis were held as the patient ultimately required pressors overnight. On 8/11 in atrial flutter and amiodarone was started.  Cardiology consulted. giving IV digoxin and plan to repeat at a lower dose in 8 hours  P:  -cardiology consulted, greatly  appreciate  -discontinuing the amiodarone drip given the prolonged QT, resumed metoprolol diltiazem with parameters.   - avoid qt-prolonging medications    Hyperlipidemia  A/p- cont statin    diabetes mellitus type 2 with neuropathy  A/p- hold jardiance, ldssi and hypoglycemia protocol on admit  - on 8/12/2024 increased to Medium dose ssi    reflux  A/p- stable, auto-sub ppi     fibromyalgia and chronic pain syndrome  A/p- hold prior pain meds w/c for respiratory depression; tylenol for now    Hx of TAVR  Hx of HFpEF  A/p- see primary issue, continue diuresis as above for now, I/Os and weights; cardiology consulted now pt is in aflutter on amio drip; will nee diuresis when hemodynamically stable    Prolonged Qtc  Sinus node dysfunction s/p pacemaker   A/p- appreciate cardiology following, avoid qt-prolonging medications    Chronic constipation  A/p- stable, continue prn PTA meds            Diet: Fluid restriction 1800 ML FLUID  Regular Diet Adult    DVT Prophylaxis: DOAC, Pneumatic Compression Devices, and Ambulate every shift  Chairez Catheter: Not present  Lines: PRESENT      PICC 08/10/24 Triple Lumen Right Brachial vein medial-Site Assessment: WDL      Cardiac Monitoring: ACTIVE order. Indication: Tachyarrhythmias, acute (48 hours)  Code Status: Full Code      Clinically Significant Risk Factors              # Hypoalbuminemia: Lowest albumin = 2.6 g/dL at 8/12/2024  5:02 AM, will monitor as appropriate       # Hypertension: Noted on problem list               # Financial/Environmental Concerns: none   # Pacemaker present             Disposition Plan     Medically Ready for Discharge: Anticipated in 2-4 Days             Rayo Mendoza MD  Hospitalist Service  [facility]  Securely message with [employer] (more info)  Text page via [employer] Paging/Directory   ______________________________________________________________________    Interval History   Naeo  Remains with high HR  Discussed w/ bedside  ICU RN  Pt has no new symptoms and feels about the same  Discussed care plans and cardiology guiding afib meds and abx still broad  Discussed video study; she wanted to speak w/ speech  Later discussed w/ speech  Updated sw       Physical Exam   Vital Signs: Temp: 97.8  F (36.6  C) Temp src: Oral BP: 93/73 Pulse: 93   Resp: 17 SpO2: 93 % O2 Device: Nasal cannula with humidification Oxygen Delivery: 4 LPM  Weight: 143 lbs 11.84 oz    General: alert, oriented and in NAD  Pulmonary: coarse breath sounds, decreased at bases, on 4L NC   CVS: regular, aflutter on tele, no murmurs, rubs, or gallops; no blatant jugular venous distention; no extremity edema and extremities are warm to the touch  GI: soft, nontender, BS+, no rebound or guarding, no conspicuous organomegaly   Neuro: nonfocal, moves all extremities of own volition  Psych: appropriate        Medical Decision Making       48 MINUTES SPENT BY ME on the date of service doing chart review, history, exam, documentation & further activities per the note.      Data   ------------------------- PAST 24 HR DATA REVIEWED -----------------------------------------------    I have personally reviewed the following data over the past 24 hrs:    8.6  \   9.3 (L)   / 174     139 101 26.6 (H) /  217 (H)   4.4 33 (H) 0.68 \     ALT: 149 (H) AST: 54 (H) AP: 171 (H) TBILI: 0.3   ALB: 2.8 (L) TOT PROTEIN: 5.9 (L) LIPASE: N/A       Imaging results reviewed over the past 24 hrs:   Recent Results (from the past 24 hour(s))   US Abdomen Limited    Narrative    EXAM: US ABDOMEN LIMITED  LOCATION: [facility]  DATE: 8/12/2024    INDICATION: Abnormal LFTs.  COMPARISON: CT 8/10/2024  TECHNIQUE: Limited abdominal ultrasound.    FINDINGS:  Study is limited by a chest tube and a compression dressing on the right side. Within limitations, findings are as follows.    GALLBLADDER: Limited evaluation is within normal limits. No gallstones, wall thickening, or  pericholecystic fluid. Negative sonographic Ospina's sign.    BILE DUCTS: No biliary dilatation. The common duct measures 7 mm.    LIVER: Normal parenchyma with smooth contour. No focal mass.    RIGHT KIDNEY: Not seen due to bowel gas.    PANCREAS: The pancreas is largely obscured by overlying gas.    No ascites.      Impression    IMPRESSION:  1.  Normal limited abdominal ultrasound within limitations detailed above.

## 2024-08-13 NOTE — PLAN OF CARE
Problem: Gas Exchange Impaired  Goal: Optimal Gas Exchange  Outcome: Not Progressing  Intervention: Optimize Oxygenation and Ventilation  Recent Flowsheet Documentation  Taken 8/13/2024 0856 by Renetta Millan RN  Head of Bed (HOB) Positioning: HOB at 20-30 degrees     Problem: Dysrhythmia  Goal: Normalized Cardiac Rhythm  Outcome: Not Progressing  Intervention: Monitor and Manage Cardiac Rhythm Effect  Recent Flowsheet Documentation  Taken 8/13/2024 1200 by Renetta Millan RN  VTE Prevention/Management: patient refused intervention  Taken 8/13/2024 0800 by Renetta Millan RN  VTE Prevention/Management: patient refused intervention   Goal Outcome Evaluation:  Pt's lungs diminished throughout.  Right side pleural Chest Tube patent with no output ; no airleaks and remains on wall sxn at -20cm.  Pt has very congested cough this afternoon; desat to 79-82%.  Increased O2 from 3L n/c to 6L.  Try get spO2 readings on other extremities;  called RT; prn neb treatment done; pt coughing and able to wean O2 to 4L per n/c.  Also, pt was aflutter with 2:1 conduction sustained rate 145 most of the morning.  SBP 80-100s;  Dr Buckley and Dr Jiménez aware;  gave Diltiazem po at 0900 scheduled time; gave Lopressor later, around 1100;  Dr Jiménez also ordered IV Digoxin to be given; see MAR;  At present, pt remain Aflutter but rate down to 90-100s.  SBPs 100s now; will check with CT regarding time of ordered CT of chest without contrast.     Problem: Pain Acute  Goal: Optimal Pain Control and Function  Outcome: Progressing  Intervention: Prevent or Manage Pain  Recent Flowsheet Documentation  Taken 8/13/2024 1200 by Renetta Millan RN  Sensory Stimulation Regulation: care clustered  Taken 8/13/2024 0800 by Renetta Millan, RN  Sensory Stimulation Regulation: care clustered  Pt denies presence of pain at present.  HLM Admission: 6- Walk 10 steps or more  HLM Daily2- Bed Activities  Barrier to getting OOB was low Bps ,  dyrthymias with rate 140s.  Pt moves in bed independentlly.  Sitting with HOB up 45 degrees.

## 2024-08-13 NOTE — PROGRESS NOTES
[facility] Care Coordination Contact    [facility]  Ambulatory Care Coordination to Inpatient Care Management   Hand-In Communication    Date:  August 13, 2024  Name: Mary Kay Tejada is enrolled in [facility] Care Coordination program and I am the Lead Care Coordinator.  CC Contact Information:.   Payor Source: Payor: [facility] / Plan: [facility] DUAL / Product Type: HMO /   Current services in place:     Please see the CC Snaphot and Care Management Flowsheets for specific  details of this Mary Kay Tejada care plan.   Additional details/specific concerns r/t this admission:    No additional concerns at this time Has PCA services 2.25 hrs dialy ad homemaking 3 hrs weekly.    I will follow this admission in Epic. Please feel free to contact me with questions or for further collaboration in discharge planning.    Samantha Alexandra RN, PHN   [facility]  [phone]

## 2024-08-14 ENCOUNTER — APPOINTMENT (OUTPATIENT)
Dept: RADIOLOGY | Facility: CLINIC | Age: 74
DRG: 871 | End: 2024-08-14
Attending: STUDENT IN AN ORGANIZED HEALTH CARE EDUCATION/TRAINING PROGRAM
Payer: COMMERCIAL

## 2024-08-14 ENCOUNTER — APPOINTMENT (OUTPATIENT)
Dept: OCCUPATIONAL THERAPY | Facility: CLINIC | Age: 74
DRG: 871 | End: 2024-08-14
Attending: STUDENT IN AN ORGANIZED HEALTH CARE EDUCATION/TRAINING PROGRAM
Payer: COMMERCIAL

## 2024-08-14 ENCOUNTER — APPOINTMENT (OUTPATIENT)
Dept: SPEECH THERAPY | Facility: CLINIC | Age: 74
DRG: 871 | End: 2024-08-14
Payer: COMMERCIAL

## 2024-08-14 LAB
ALBUMIN SERPL BCG-MCNC: 3 G/DL (ref 3.5–5.2)
ALP SERPL-CCNC: 154 U/L (ref 40–150)
ALT SERPL W P-5'-P-CCNC: 107 U/L (ref 0–50)
AST SERPL W P-5'-P-CCNC: 26 U/L (ref 0–45)
BACTERIA PLR CULT: ABNORMAL
BILIRUB DIRECT SERPL-MCNC: <0.2 MG/DL (ref 0–0.3)
BILIRUB SERPL-MCNC: 0.3 MG/DL
GALACTOMANNAN AG BAL QL: NEGATIVE
GALACTOMANNAN AG SPEC IA-ACNC: 0.08
GLUCOSE BLDC GLUCOMTR-MCNC: 159 MG/DL (ref 70–99)
GLUCOSE BLDC GLUCOMTR-MCNC: 203 MG/DL (ref 70–99)
GLUCOSE BLDC GLUCOMTR-MCNC: 294 MG/DL (ref 70–99)
GLUCOSE BLDC GLUCOMTR-MCNC: 91 MG/DL (ref 70–99)
GRAM STAIN RESULT: ABNORMAL
GRAM STAIN RESULT: ABNORMAL
MAGNESIUM SERPL-MCNC: 2.4 MG/DL (ref 1.7–2.3)
POTASSIUM SERPL-SCNC: 4.3 MMOL/L (ref 3.4–5.3)
PROT SERPL-MCNC: 5.7 G/DL (ref 6.4–8.3)

## 2024-08-14 PROCEDURE — 92611 MOTION FLUOROSCOPY/SWALLOW: CPT | Mod: GN

## 2024-08-14 PROCEDURE — 80076 HEPATIC FUNCTION PANEL: CPT

## 2024-08-14 PROCEDURE — 250N000012 HC RX MED GY IP 250 OP 636 PS 637: Performed by: INTERNAL MEDICINE

## 2024-08-14 PROCEDURE — 92526 ORAL FUNCTION THERAPY: CPT | Mod: GN

## 2024-08-14 PROCEDURE — 99232 SBSQ HOSP IP/OBS MODERATE 35: CPT | Performed by: INTERNAL MEDICINE

## 2024-08-14 PROCEDURE — 74230 X-RAY XM SWLNG FUNCJ C+: CPT

## 2024-08-14 PROCEDURE — 99232 SBSQ HOSP IP/OBS MODERATE 35: CPT | Performed by: STUDENT IN AN ORGANIZED HEALTH CARE EDUCATION/TRAINING PROGRAM

## 2024-08-14 PROCEDURE — 250N000011 HC RX IP 250 OP 636: Performed by: INTERNAL MEDICINE

## 2024-08-14 PROCEDURE — 250N000013 HC RX MED GY IP 250 OP 250 PS 637: Performed by: INTERNAL MEDICINE

## 2024-08-14 PROCEDURE — 97535 SELF CARE MNGMENT TRAINING: CPT | Mod: GO

## 2024-08-14 PROCEDURE — 84132 ASSAY OF SERUM POTASSIUM: CPT | Performed by: INTERNAL MEDICINE

## 2024-08-14 PROCEDURE — 97166 OT EVAL MOD COMPLEX 45 MIN: CPT | Mod: GO

## 2024-08-14 PROCEDURE — 99232 SBSQ HOSP IP/OBS MODERATE 35: CPT | Mod: GC | Performed by: INTERNAL MEDICINE

## 2024-08-14 PROCEDURE — 258N000003 HC RX IP 258 OP 636: Performed by: INTERNAL MEDICINE

## 2024-08-14 PROCEDURE — 250N000009 HC RX 250: Performed by: INTERNAL MEDICINE

## 2024-08-14 PROCEDURE — 120N000004 HC R&B MS OVERFLOW

## 2024-08-14 PROCEDURE — 83735 ASSAY OF MAGNESIUM: CPT | Performed by: INTERNAL MEDICINE

## 2024-08-14 RX ADMIN — GABAPENTIN 600 MG: 600 TABLET, FILM COATED ORAL at 08:10

## 2024-08-14 RX ADMIN — DILTIAZEM HYDROCHLORIDE 120 MG: 120 CAPSULE, COATED, EXTENDED RELEASE ORAL at 08:10

## 2024-08-14 RX ADMIN — POLYETHYLENE GLYCOL 3350 17 G: 17 POWDER, FOR SOLUTION ORAL at 08:10

## 2024-08-14 RX ADMIN — SUCRALFATE 1 G: 1 TABLET ORAL at 06:41

## 2024-08-14 RX ADMIN — SENNOSIDES 1 TABLET: 8.6 TABLET, FILM COATED ORAL at 08:10

## 2024-08-14 RX ADMIN — APIXABAN 5 MG: 5 TABLET, FILM COATED ORAL at 20:57

## 2024-08-14 RX ADMIN — SENNOSIDES 1 TABLET: 8.6 TABLET, FILM COATED ORAL at 20:56

## 2024-08-14 RX ADMIN — METOPROLOL TARTRATE 50 MG: 50 TABLET, FILM COATED ORAL at 00:40

## 2024-08-14 RX ADMIN — DIGOXIN 125 MCG: 125 TABLET ORAL at 08:10

## 2024-08-14 RX ADMIN — DORNASE ALFA 50 ML: 1 SOLUTION RESPIRATORY (INHALATION) at 11:58

## 2024-08-14 RX ADMIN — FLUTICASONE FUROATE AND VILANTEROL TRIFENATATE 1 PUFF: 200; 25 POWDER RESPIRATORY (INHALATION) at 08:11

## 2024-08-14 RX ADMIN — SUCRALFATE 1 G: 1 TABLET ORAL at 16:11

## 2024-08-14 RX ADMIN — PANTOPRAZOLE SODIUM 40 MG: 40 TABLET, DELAYED RELEASE ORAL at 06:41

## 2024-08-14 RX ADMIN — APIXABAN 5 MG: 5 TABLET, FILM COATED ORAL at 08:10

## 2024-08-14 RX ADMIN — PREDNISONE 40 MG: 20 TABLET ORAL at 08:10

## 2024-08-14 RX ADMIN — GABAPENTIN 600 MG: 600 TABLET, FILM COATED ORAL at 20:56

## 2024-08-14 RX ADMIN — ATORVASTATIN CALCIUM 20 MG: 10 TABLET, FILM COATED ORAL at 21:02

## 2024-08-14 RX ADMIN — METOPROLOL TARTRATE 50 MG: 50 TABLET, FILM COATED ORAL at 12:34

## 2024-08-14 RX ADMIN — DORNASE ALFA 50 ML: 1 SOLUTION RESPIRATORY (INHALATION) at 22:19

## 2024-08-14 RX ADMIN — GABAPENTIN 600 MG: 600 TABLET, FILM COATED ORAL at 13:16

## 2024-08-14 RX ADMIN — CEFTRIAXONE SODIUM 2 G: 2 INJECTION, POWDER, FOR SOLUTION INTRAMUSCULAR; INTRAVENOUS at 17:22

## 2024-08-14 RX ADMIN — SUCRALFATE 1 G: 1 TABLET ORAL at 12:34

## 2024-08-14 ASSESSMENT — ACTIVITIES OF DAILY LIVING (ADL)
ADLS_ACUITY_SCORE: 44
ADLS_ACUITY_SCORE: 51
ADLS_ACUITY_SCORE: 50
ADLS_ACUITY_SCORE: 39
ADLS_ACUITY_SCORE: 39
ADLS_ACUITY_SCORE: 50
ADLS_ACUITY_SCORE: 44
ADLS_ACUITY_SCORE: 44
ADLS_ACUITY_SCORE: 51
ADLS_ACUITY_SCORE: 44
ADLS_ACUITY_SCORE: 45
ADLS_ACUITY_SCORE: 44
ADLS_ACUITY_SCORE: 45
ADLS_ACUITY_SCORE: 39
ADLS_ACUITY_SCORE: 39
ADLS_ACUITY_SCORE: 51
ADLS_ACUITY_SCORE: 39

## 2024-08-14 NOTE — PROGRESS NOTES
[facility] was called to transport pt to CT. Pt was VS during transport and back. While laying on the CT table she did desat to 83% but returned to baseline with 20degree HOB. Primary RN notified.     EUSEBIA WEAVER RN

## 2024-08-14 NOTE — PROGRESS NOTES
"Care Management Follow Up    Length of Stay (days): 4    Expected Discharge Date: 08/19/2024     Concerns to be Addressed: discharge planning     Patient plan of care discussed at interdisciplinary rounds: Yes    Anticipated Discharge Disposition: Home, Home Care     Anticipated Discharge Services: None  Anticipated Discharge DME: None    Patient/family educated on Medicare website which has current facility and service quality ratings: yes  Education Provided on the Discharge Plan: Yes  Patient/Family in Agreement with the Plan: yes    Referrals Placed by CM/SW: Homecare  Private pay costs discussed: Not applicable    Additional Information:    8:22 AM  [employer] Samantha FRANCO -[phone] -  received notification pt is enrolled in [employer]  program.  Pt receives PCA 2.25hours/day and HMK (homemaking) 3 hours/week.    Per CM consult note 8/10: \"Pt resides in the home of her son and daughter-in-law Cynthia who is also pt's PCA. Family assists with I/ADL support both as family and as PCA as needed. No additional in-home services identified. Home O2 and ambulatory DME utilized. No concerns identified with eventual improvement and return to home.\"    SLP has recommended homecare ST services.    OT eval pending.    3:42 PM  SWCM assisted in sending out TCU referrals, per pt's preferences.    Son Terrell - Updated.    CM will continue to follow.     Roland Barakat RN    "

## 2024-08-14 NOTE — PROGRESS NOTES
Care Management Follow Up    Length of Stay (days): 4    Expected Discharge Date: 08/19/2024     Concerns to be Addressed: discharge planning     Patient plan of care discussed at interdisciplinary rounds: Yes    Anticipated Discharge Disposition: Home, Home Care, TCU     Anticipated Discharge Services: None  Anticipated Discharge DME: None    Patient/family educated on Medicare website which has current facility and service quality ratings: yes  Education Provided on the Discharge Plan: Yes  Patient/Family in Agreement with the Plan: yes    Referrals Placed by CM/SW: Homecare, TCU  Private pay costs discussed: Not applicable    Additional Information:  Met with pt regarding recommendation for TCU.    Pt agreeable to referrals being made to  [facility].      LUZ Lincoln

## 2024-08-14 NOTE — PROGRESS NOTES
[facility]    Medicine Progress Note - Hospitalist Service    Date of Admission:  8/10/2024    Assessment & Plan      Mary Kay Tejada is a 74 year old female admitted on 8/10/2024. She has a pmhx of paroxysmal atrial flutter on apixaban, COPD, hyperlipidemia, diabetes mellitus type 2 with neuropathy, reflux, dizziness, tobacco use, fibromyalgia and chronic pain syndrome, prior pleural effusions and most recent admission in July found with E. coli pneumonia and a right sided pleural effusion which was consistent with a transudate and negative cultures.      Presentation of shortness of breath progressive over several days/week with acute worsening on day of admission.  Arrived to ER hypotensive MAP 60s and tachycardia 150s. Found with large right sided pleural effusion and respiratory acidosis and s/p bedside pigtail catheter chest tube placement in ER. On 6L then on bipap. HR then 70s. Fluid studies sent. BNP 8987, wbc 13.1, hgb 11.4. Started on zosyn, continued. Given lasix 60mg iv in total (40mg x1 and then 20mg q8 hrs). Continued with q8 hrs gentle diuresis.  Chest tube was noted to have its tip at the right apex with plan for IR evaluation and RE-positioning.  IR consulted but unavailable so plan was for potential transfer; overnight had low blood pressures ultimately started on norepinephrine, transferred to ICU, additional vasopressin was added. ICU/Pulmonary following. On 8/11 Pulm/ICU repositioned Chest Tube directly at bedside along with a subsequent bronchoscopy (showed oral candidiasis, hyperemic mucosa, and mild thick secretions, no endobronchial lesions).  Patient went into in aflutter and started on amiodarone drip and Cardiology consulted.  Speech consulted.  Respiratory aerobic cultures now growing E.Coli and Strep Pneumo. On 8/12 started digoxin, attempt to wean off of amiodarone.  Increased insulin sliding scale. Steroids per pulmonary, taper now (decrease by 10mg q3  days).    On 8/13, discontinued amiodarone drip given the prolonged QT, resumed metoprolol diltiazem with parameters. Avoid QTc prolonging medications.  Remained off pressors. Added PT and OT for eventual dispo. Discussed w/ speech, video study scheduled.     Will need diuresis when further stable. CT shows large right pleural effusion with right lung collapse and plan is continue chest tube. 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 Appreciate pulm + cardiology + speech  Plan:  -Appreciate pulmonary service and repositioning of the chest tube and pressor management  -Apixaban resumed after procedure and bronch  - for 8/13/2024 -discontinuing the amiodarone drip given the prolonged QT, resumed metoprolol diltiazem with parameters.defer to expertise of cardiology  -diuresis when stable, not yet, defer to cardiology  --Scheduled DuoNebs and RT   -Saline nebulizers  -Dysphagia diet  -Speech consulted   - discussed; Video-study w/ Speech scheduled for 8/14.   -Continue vancomycin per pharmacy dosing  -Continue Zosyn, every 8 hours  - follow cultures, now strep pneumo and e coli, follow for S&R  - follow studies and bronch of RUL and RLL  -I's and O's and weights  -steroids per pulmonary, completing 8/13/2024   -fluid Restrict to 1.8 L     paroxysmal atrial flutter on apixaban  A- on arrival 150s but on admit was 70s-80s; antihypertensives and diuresis were held as the patient ultimately required pressors overnight. On 8/11 in atrial flutter and amiodarone was started.  Cardiology consulted. giving IV digoxin and plan to repeat at a lower  dose in 8 hours  P:  -cardiology consulted, greatly appreciate  -discontinuing the amiodarone drip given the prolonged QT, resumed metoprolol diltiazem with parameters.   - avoid qt-prolonging medications  -digoxin, iv then now transition to PO 8/14/2024     Hyperlipidemia  A/p- cont statin    diabetes mellitus type 2 with neuropathy  A/p- hold jardiance, ldssi and hypoglycemia protocol on admit  - on 8/12/2024 increased to Medium dose ssi    reflux  A/p- stable, auto-sub ppi     fibromyalgia and chronic pain syndrome  A/p- hold prior pain meds w/c for respiratory depression; tylenol for now    Hx of TAVR  Hx of HFpEF  A/p- see primary issue, continue diuresis as above for now, I/Os and weights; cardiology consulted now pt is in aflutter on amio drip; will nee diuresis when hemodynamically stable    Prolonged Qtc  Sinus node dysfunction s/p pacemaker   A/p- appreciate cardiology following, avoid qt-prolonging medications    Chronic constipation  A/p- stable, continue prn PTA meds            Diet: Fluid restriction 1800 ML FLUID  Regular Diet Adult    DVT Prophylaxis: DOAC, Pneumatic Compression Devices, and Ambulate every shift  Chairez Catheter: Not present  Lines: PRESENT      PICC 08/10/24 Triple Lumen Right Brachial vein medial-Site Assessment: WDL      Cardiac Monitoring: ACTIVE order. Indication: Tachyarrhythmias, acute (48 hours)  Code Status: Full Code      Clinically Significant Risk Factors              # Hypoalbuminemia: Lowest albumin = 2.6 g/dL at 8/12/2024  5:02 AM, will monitor as appropriate       # Hypertension: Noted on problem list               # Financial/Environmental Concerns: none   # Pacemaker present             Disposition Plan     Medically Ready for Discharge: Anticipated in 2-4 Days             Rayo Mendoza MD  Hospitalist Service  [facility]  Securely message with [employer] (more info)  Text page via [employer] Paging/Directory    ______________________________________________________________________    Interval History   Naeo  Remains in afib high rates  Pt has no new pain or symptoms, feeling a bit better subjectively  However we did discuss her CT results and she was a bit discouraged but refocused on other elements and feeling well; answered all her Qs and cardiology and icu to round  Discussed video study set for today  Updated       Physical Exam   Vital Signs: Temp: 97.6  F (36.4  C) Temp src: Oral BP: 123/67 Pulse: 75   Resp: 18 SpO2: 93 % O2 Device: Nasal cannula with humidification Oxygen Delivery: 3 LPM  Weight: 146 lbs 12.8 oz    General: alert, oriented and in NAD  Pulmonary: coarse breath sounds, decreased at bases, on 3L NC; chest tube intact  CVS: irregular, no murmurs, rubs, or gallops; no blatant jugular venous distention; no extremity edema and extremities are warm to the touch  GI: soft, nontender, BS+, no rebound or guarding, no conspicuous organomegaly   Neuro: nonfocal, moves all extremities of own volition  Psych: appropriate      Medical Decision Making       49 MINUTES SPENT BY ME on the date of service doing chart review, history, exam, documentation & further activities per the note.      Data   ------------------------- PAST 24 HR DATA REVIEWED -----------------------------------------------    I have personally reviewed the following data over the past 24 hrs:    N/A  \   N/A   / N/A     N/A N/A N/A /  91   4.3 N/A N/A \     ALT: N/A AST: N/A AP: N/A TBILI: N/A   ALB: N/A TOT PROTEIN: N/A LIPASE: N/A       Imaging results reviewed over the past 24 hrs:   Recent Results (from the past 24 hour(s))   CT Chest w/o Contrast    Narrative    EXAM: CT CHEST W/O CONTRAST  LOCATION: [facility]  DATE: 8/13/2024    INDICATION: empyema s p chest tube, ?evacuated  COMPARISON: 8/10/2024  TECHNIQUE: CT chest without IV contrast. Multiplanar reformats were obtained. Dose reduction techniques were  used.  CONTRAST: None.    FINDINGS:   LUNGS AND PLEURA: Right basilar pigtail pleural drain in place. Large right pleural effusion with complete atelectasis of the right lung. A few foci of gas in the pleural space. Small left pleural effusion. Emphysema. Mild frothy secretions in trachea.   Debris occludes the central right lung airways.    MEDIASTINUM/AXILLAE: Stable left subclavian approach pacemaker and TAVR. Right PICC with tip near the cavoatrial junction. Rightward mediastinal shift. No lymphadenopathy.    CORONARY ARTERY CALCIFICATION: Moderate.    UPPER ABDOMEN: Unchanged thickening of the left adrenal gland.    MUSCULOSKELETAL: Unremarkable      Impression    IMPRESSION:   1.  Large right pleural effusion with right lung collapse.

## 2024-08-14 NOTE — PROGRESS NOTES
"   08/14/24 0910   Appointment Info   Signing Clinician's Name / Credentials (OT) KAYKAY Antonio/L, CLT   Rehab Comments (OT) OT eval;chest tube;maxAx2   Living Environment   People in Home child(david), adult  (son and DIL and DIL is PCA)   Current Living Arrangements house   Home Accessibility stairs to enter home   Number of Stairs, Main Entrance 3   Stair Railings, Main Entrance railing on right side (ascending)   Self-Care   Usual Activity Tolerance moderate   Equipment Currently Used at Home cane, quad;commode chair;glucometer;walker, rolling;shower chair  (walk-in shower; toilet next to sink)   Activity/Exercise/Self-Care Comment Pt is typically independent with ADL at home and making simple meals in microwave and PCA with in AM, then goes to work but can stop in PRN and pt won't shower without \"someone there\". Pt has a couple neighbors to assist PRN. Nephew can come by after work as well.   General Information   Onset of Illness/Injury or Date of Surgery 08/10/24   Referring Physician Dr. Rayo Mendoza   Patient/Family Therapy Goal Statement (OT) open to TCU   Additional Occupational Profile Info/Pertinent History of Current Problem 74 year old female admitted on 8/10/2024. She has a pmhx of paroxysmal atrial flutter on apixaban, COPD, hyperlipidemia, diabetes mellitus type 2 with neuropathy, reflux, dizziness, tobacco use, fibromyalgia and chronic pain syndrome, prior pleural effusions and most recent admission in July found with E. 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; s/p repositioning on 8/11  Acute on chronic respiratory failure with hypercapnia  Recurrent right-sided large effusion   Hospital-acquired pneumonia, s/p bronchoscopy   COPD exacerbation   Existing Precautions/Restrictions   (chest tube; " tele; O2 4L)   Cognitive Status Examination   Affect/Mental Status (Cognitive) WFL   Visual Perception   Visual Impairment/Limitations WFL   Sensory   Sensory Quick Adds sensation intact   Pain Assessment   Patient Currently in Pain No   Posture   Posture   (appeared difficult to stand straight)   Range of Motion Comprehensive   General Range of Motion no range of motion deficits identified   Strength Comprehensive (MMT)   Comment, General Manual Muscle Testing (MMT) Assessment generalized weakness   Muscle Tone Assessment   Muscle Tone Quick Adds No deficits were identified   Coordination   Upper Extremity Coordination No deficits were identified   Balance   Balance Comments decreased   Clinical Impression   Criteria for Skilled Therapeutic Interventions Met (OT) Yes, treatment indicated   OT Diagnosis decreased ADL independence/safety.   OT Problem List-Impairments impacting ADL activity tolerance impaired   Assessment of Occupational Performance 3-5 Performance Deficits   Identified Performance Deficits dressing, toileting, bathing, functional mobility, bed mobility   Planned Therapy Interventions (OT) ADL retraining   Clinical Decision Making Complexity (OT) detailed assessment/moderate complexity   Risk & Benefits of therapy have been explained care plan/treatment goals reviewed;patient   OT Total Evaluation Time   OT Eval, Moderate Complexity Minutes (67569) 10   OT Goals   Therapy Frequency (OT) 5 times/week   OT Predicted Duration/Target Date for Goal Attainment 08/20/24   OT Goals Lower Body Dressing;Toilet Transfer/Toileting   OT: Lower Body Dressing Supervision/stand-by assist   OT: Toilet Transfer/Toileting using adaptive equipment;Supervision/stand-by assist   Interventions   Interventions Quick Adds Self-Care/Home Management   Self-Care/Home Management   Self-Care/Home Mgmt/ADL, Compensatory, Meal Prep Minutes (73483) 30   Symptoms Noted During/After Treatment (Meal Preparation/Planning Training)  "dizziness;increased pain   Treatment Detail/Skilled Intervention all transfers with AX2 and appeared variable with minAx2, then modAx2, then maxAx2 with pivot transfers from bed to commode, then to chair. Pt needed maxA with donning shoes, min A with doffing socks with AE after instruction, and maxA with toileting juanjo cares d/t weakness/fatigue. Pt needed increased cues and instruction for all transfers for hand placement and tech and  pt appeared cognisant of safety precautions and insisted she did not want to get up alone to \"not fall on my head\". call light in reach.   OT Discharge Planning   OT Plan any pivot txs;chest tube;Ax2;ADL   OT Discharge Recommendation (DC Rec) (S)  Transitional Care Facility   OT Rationale for DC Rec incr weakness/fatigue and needs OT to incr ADL safety/indep   OT Brief overview of current status maxAx2 for func mob and donning shoes/toileting   OT Equipment Needed at Discharge   (none)   Total Session Time   Timed Code Treatment Minutes 30   Total Session Time (sum of timed and untimed services) 40       "

## 2024-08-14 NOTE — PROGRESS NOTES
HEART CARE CONSULTATON NOTE        Assessment/Recommendations   Assessment:   1.  Acute on chronic hypoxic respiratory failure 2/2 large right-sided pleural effusion with pneumothorax.  E. coli pneumonia (recent).  Chest tube 8/10.    2.  Circulatory shock required pressors on admission.  Resolved.     3.  Acute on chronic heart failure with preserved ejection fraction  4.  Severe aortic stenosis: Status post TAVR February 2024. (Stable gradients)  5.  Paroxysmal atrial fibrillation/flutter with RVR, start amiodarone gtt on 8/11  5.  Sinus node dysfunction status post pacemaker placement    Plan:  1. Continue metoprolol and diltizem.   2. Digoxin 125 mcg PO daily (can discontinue if heart rate improved and on stable dose of CBB and BB). Check digoxin level tomorrow.    3. Qtc: 560 ms.   AVOID any further Qtc prolongation medications, will attempt to dischontinue amiodarone.      4. Continue Eliquis       History of Present Illness/Subjective    S: Patient continues to have chest wall pain from her chest tube.  Denies any active fever chills.  No palpitations.  Reviewed telemetry she is in atrial flutter with controlled heart rates with 4-1 block.  Blood pressure improved.  Remains on digoxin.  Off amiodarone    ECHO: 4/17/2024  1. Normal left ventricular size and systolic performance with a visually  estimated ejection fraction of 55-60%.  2. There is a bio-prosthetic aortic valve (documented 26 mm Douglas Mai 3  Resilia tissue valve).  Â  Normal aortic valve prosthesis metrics with a mean systolic gradient of 8  mmHg and a peak anterograde velocity of 1.9 m/sec.  Â  No aortic insufficiency is detected.  3. Normal right ventricular size and systolic performance.  4. There is mild left atrial enlargement.     Â When compared to the prior real-time echocardiogram dated 21 February 2024,  the pacing electrodes appear to be new. The findings are otherwise felt to be  fairly similar on both examinations.  "    Physical Examination     VITALS: /67 (BP Location: Left arm)   Pulse 75   Temp 97.6  F (36.4  C) (Oral)   Resp 18   Ht 1.651 m (5' 5\")   Wt 66.6 kg (146 lb 12.8 oz)   LMP  (LMP Unknown)   SpO2 93%   BMI 24.43 kg/m    BMI: Body mass index is 24.43 kg/m .  Wt Readings from Last 3 Encounters:   08/14/24 66.6 kg (146 lb 12.8 oz)   07/15/24 65.3 kg (143 lb 14.4 oz)   05/22/24 66.2 kg (146 lb)       Intake/Output Summary (Last 24 hours) at 8/12/2024 1038  Last data filed at 8/12/2024 0800  Gross per 24 hour   Intake 3605.56 ml   Output 1490 ml   Net 2115.56 ml     General Appearance:   no distress, upright in bed.  fatigued   ENT/Mouth: membranes moist, no oral lesions or bleeding gums.      EYES:  no scleral icterus, normal conjunctivae   Neck: no carotid bruits or thyromegaly   Chest/Lungs:   Course lung sounds. + chest tube.    Cardiovascular:   Regular, rapid.  No edema.                               Lab Results    Chemistry/lipid CBC Cardiac Enzymes/BNP/TSH/INR   Recent Labs   Lab Test 09/20/23  1136   CHOL 167   HDL 75   LDL 75   TRIG 83     Recent Labs   Lab Test 09/20/23  1136 07/25/22  1026 12/24/21  0756   LDL 75 53 77     Recent Labs   Lab Test 08/12/24  0748 08/12/24  0502   NA  --  137   POTASSIUM  --  3.9   CHLORIDE  --  97*   CO2  --  29   * 351*   BUN  --  26.4*   CR  --  0.85   GFRESTIMATED  --  71   JOEY  --  8.3*     Recent Labs   Lab Test 08/12/24  0502 08/11/24  0400 08/10/24  0937   CR 0.85 0.75 0.93     Recent Labs   Lab Test 02/06/24  1953 09/20/23  1136 05/25/23  0507   A1C 6.2* 6.1* 6.1*          Recent Labs   Lab Test 08/12/24  0502   WBC 12.0*   HGB 9.0*   HCT 31.0*   MCV 85        Recent Labs   Lab Test 08/12/24  0502 08/11/24  0400 08/10/24  0937   HGB 9.0* 9.3* 11.4*    Recent Labs   Lab Test 05/24/23  1322 05/24/23  1015 02/08/23  0759   TROPONINI 0.07 0.08 0.09     Recent Labs   Lab Test 08/12/24  0502 08/10/24  0937 07/13/24  0316 07/12/24  2030 " 02/19/24  1340 02/06/24  1953 06/02/23  0057 05/24/23  1015 02/08/23  0759   BNP  --   --   --   --   --   --  136* 176* 867*   NTBNPI 6,274* 8,987* 698   < >  --    < >  --   --   --    NTBNP  --   --   --   --  641  --   --   --   --     < > = values in this interval not displayed.     Recent Labs   Lab Test 08/10/24  0937   TSH 2.80     Recent Labs   Lab Test 07/13/24  0703 07/12/24  2030 02/19/24  1340   INR 1.27* 1.45* 1.04        Medical History  Surgical History Family History Social History   Past Medical History:   Diagnosis Date    Anemia     Aortic stenosis     Aortic valve disorder     Atrial fibrillation (H)     Atrial flutter (H)     Benign neoplasm of adenomatous polyp     large intestine     Chronic constipation     Chronic heart failure with preserved ejection fraction (H) 02/29/2024    Chronic pain syndrome     Congestive heart failure (H)     COPD (chronic obstructive pulmonary disease) (H)     Oxygen at night     Dependence on supplemental oxygen     Oxygen at noc, during the day as needed    Depression     Diabetes mellitus (H)     Dry eye syndrome     Fibromyalgia     Ganglion     left wrist    GERD (gastroesophageal reflux disease)     Hyperlipidemia     Hypertension     Hypokalemia     Infective otitis externa, unspecified     Created by Conversion     Larynx edema     Lung disease     Malignant neoplasm of vulva (H)     Created by Conversion [facility] Annotation: Apr 17 2007  8:24AM - Cammy Bui:  resection per Dr. Alfonso Mane 9/06;  Replacement Utility updated for latest IMO load    Medial epicondylitis     Onychomycosis     Osteoarthritis     Peptic ulcer     Polyneuropathy     Vulvar malignant neoplasm (H)      Past Surgical History:   Procedure Laterality Date    BIOPSY BREAST Right     BIOPSY BREAST Right 01/28/2015    BIOPSY BREAST Right 01/28/2015    Procedure: RIGHT BREAST BIOPSY AFTER WIRE LOCALIZATION AT 0940;  Surgeon: Renée Soriano MD;  Location: [facility]  Main OR;  Service:     BIOPSY OF BREAST, INCISIONAL      Description: Incisional Breast Biopsy;  Recorded: 11/13/2007;  Comments: benign    COLONOSCOPY N/A 06/14/2019    Procedure: COLONOSCOPY;  Surgeon: Eduardo Mora MD;  Location: [facility] Main OR;  Service: Gastroenterology    CV CORONARY ANGIOGRAM N/A 02/08/2024    Procedure: CV CORONARY ANGIOGRAM;  Surgeon: Moises Valencia MD;  Location: [facility] CV    CV LEFT HEART CATH N/A 02/08/2024    Procedure: Left Heart Catheterization;  Surgeon: Moises Valencia MD;  Location: [facility]    CV RIGHT HEART CATH MEASUREMENTS RECORDED N/A 02/08/2024    Procedure: Right Heart Catheterization;  Surgeon: Moises Valencia MD;  Location: [facility]    CV TRANSCATHETER AORTIC VALVE REPLACEMENT-FEMORAL APPROACH N/A 02/20/2024    Procedure: Transcatheter Aortic Valve Replacement, possible cardiopulmonary bypass, possible surgical intervention;  Surgeon: Moises Valencia MD;  Location: [facility]    EP PACEMAKER DEVICE & IMPLANT- HIS LEAD DUAL N/A 3/7/2024    Procedure: Pacemaker Device & Lead Implant - HIS Lead Dual;  Surgeon: Donnell Leon MD;  Location: [facility] CV    ESOPHAGOSCOPY, GASTROSCOPY, DUODENOSCOPY (EGD), COMBINED N/A 11/06/2018    Procedure: ESOPHAGOGASTRODUODENOSCOPY;  Surgeon: Lit Fernando MD;  Location: [facility] OR;  Service:     HYSTERECTOMY      JOINT REPLACEMENT Left     TKA    OR TRANSCATHETER AORTIC VALVE REPLACEMENT, FEMORAL PERCUTANEOUS APPROACH (STANDBY) N/A 02/20/2024    Procedure: OR TRANSCATHETER AORTIC VALVE REPLACEMENT, FEMORAL PERCUTANEOUS APPROACH (STANDBY);  Surgeon: Ishmael Farias MD;  Location: [facility] CV    PICC TRIPLE LUMEN PLACEMENT  01/12/2023         PICC TRIPLE LUMEN PLACEMENT  8/10/2024    CO ABLATE HEART DYSRHYTHM FOCUS      Description: Catheter Ablation Atrial Fibrillation;  Recorded: 07/31/2012;  Comments: 7/24/12 PVI with Dr. Gardiner  and cyro to all 5 pulm veins and CTI fl ablation line as well.    TRANSCATHETER AORTIC-VALVE REPLACEMENT      ZZC SUPRACERV ABD HYSTERECTOMY      Description: Supracervical Hysterectomy;  Proc Date: 01/01/1985;  Comments: some cervix left!; ovaries intact; done for bleeding     Family History   Problem Relation Age of Onset    Heart Failure Mother     Cancer Other         paternal HX-laryngeal     Alcoholism Sister     No Known Problems Daughter     No Known Problems Maternal Grandmother     No Known Problems Maternal Grandfather     No Known Problems Paternal Grandmother     No Known Problems Paternal Grandfather     No Known Problems Maternal Aunt     No Known Problems Paternal Aunt     Alcoholism Sister     Alcoholism Brother     Alcoholism Father     Cancer Paternal Uncle         Gastric-Alcohol    Cancer Paternal Uncle         gastric-Alcohol    Hereditary Breast and Ovarian Cancer Syndrome No family hx of     Breast Cancer No family hx of     Colon Cancer No family hx of     Endometrial Cancer No family hx of     Ovarian Cancer No family hx of         Social History     Socioeconomic History    Marital status:      Spouse name: Not on file    Number of children: Not on file    Years of education: Not on file    Highest education level: Not on file   Occupational History    Not on file   Tobacco Use    Smoking status: Some Days     Current packs/day: 0.25     Types: Cigarettes     Passive exposure: Never    Smokeless tobacco: Never    Tobacco comments:     seen by TTS inpatient on 3/31/22   Vaping Use    Vaping status: Never Used   Substance and Sexual Activity    Alcohol use: Yes     Comment: Alcoholic Drinks/day: very little    Drug use: No    Sexual activity: Not on file   Other Topics Concern    Not on file   Social History Narrative    Not on file     Social Determinants of Health     Financial Resource Strain: Low Risk  (12/26/2023)    Financial Resource Strain     Within the past 12 months, have  "you or your family members you live with been unable to get utilities (heat, electricity) when it was really needed?: No   Food Insecurity: Low Risk  (12/26/2023)    Food Insecurity     Within the past 12 months, did you worry that your food would run out before you got money to buy more?: No     Within the past 12 months, did the food you bought just not last and you didn t have money to get more?: No   Transportation Needs: Low Risk  (12/26/2023)    Transportation Needs     Within the past 12 months, has lack of transportation kept you from medical appointments, getting your medicines, non-medical meetings or appointments, work, or from getting things that you need?: No   Physical Activity: Not on file   Stress: Not on file   Social Connections: Not on file   Interpersonal Safety: Low Risk  (4/1/2024)    Interpersonal Safety     Do you feel physically and emotionally safe where you currently live?: Yes     Within the past 12 months, have you been hit, slapped, kicked or otherwise physically hurt by someone?: No     Within the past 12 months, have you been humiliated or emotionally abused in other ways by your partner or ex-partner?: No   Housing Stability: Low Risk  (12/26/2023)    Housing Stability     Do you have housing? : Yes     Are you worried about losing your housing?: No         Medications  Allergies   No current outpatient medications on file.        Allergies   Allergen Reactions    Celebrex [Celecoxib] Rash     patient had butterfly rash - \"lupus-like\"      Latex Rash         Bautista Jiménez, DO    "

## 2024-08-14 NOTE — PLAN OF CARE
Problem: Skin Injury Risk Increased  Goal: Skin Health and Integrity  Intervention: Plan: Nurse Driven Intervention: Moisture Management  Recent Flowsheet Documentation  Taken 8/13/2024 2100 by Michelle Aguilar RN  Moisture Interventions: Incontinence pad  Taken 8/13/2024 1630 by Michelle Aguilar RN  Moisture Interventions: Incontinence pad     Problem: Gas Exchange Impaired  Goal: Optimal Gas Exchange  Outcome: Progressing     Problem: Pain Acute  Goal: Optimal Pain Control and Function  Outcome: Progressing     Problem: Dysrhythmia  Goal: Normalized Cardiac Rhythm  Outcome: Progressing   Goal Outcome Evaluation:  I Illness Severity: Watcher    PPatient Summary:      Telemetry Orders: Yes    Interpretation of Cardiac Rhythm: a flutter    Dyspnea improved and O2 sats >88% at RA or at prior home O2 therapy level:  No    Last Bowel Movement: 08/13/24      Acute Symptoms or Concerns: no      Is this a new or worsening symptom or concern?  N/A    Is this symptom or concern being adequately managed?  N/A    Shift Summary: Pt doing well, minimal complaints of pain, pt tolerating PO, HR controlled, VSS.     AActions:Pt to CT for eval of chest    Diagnostic testing and/or procedures completed, and results are available for discharge:  Not Met    SSituational Awareness:      Anticipated post discharge treatment plan formulated and the following needs have been identified:   rehab (PT, OT, ST)    SSynthesis:     Was bedside rounding completed on your shift? Yes     What team members present during bedside rounds?  RN

## 2024-08-14 NOTE — PLAN OF CARE
Goal Outcome Evaluation:           Overall Patient Progress: no change    Pt slept on/off overnight.  Small BM.  Okay UO, using purewick.  VS stable, pt on 4L O2.  No c/o pain.  Minimal output from CT

## 2024-08-14 NOTE — PROGRESS NOTES
[facility]    Progress Note - Hospitalist Service       Date of Admission:  8/10/2024    Assessment & Plan   Mary Kay Tejada is a 74 year old female admitted on 8/10/2024. She has a history of HTN, CAD, HFpEF, Afib on eliquis, aortic stenosis s/p TAVR, s/p pacemaker , COPD, tobacco user and is admitted for for SOB and found to have large right pleural effusion with empyema, pneumonia and hypotension.    Pulmonary:  1) Empyema with large effusion s/p chest tube. Adding tPA today  2) Emphysema  3) COPD on Home O2 (3L) with current exacerbation  4) Pneumonia  Chest CT done on  8/13/24 showed large right side effusion with right side lung collapse. Pleural fluid culture has grown strep pneumonia susceptible for ceftriaxone.  -continue prednisone 40 mg PO daily  -Follow up bronchoscopy results  -PRN nebs (moved from standing nebs due to afib with RVR  -Chest tube to remain in place   -Wean O2 for sats 88-92%, currently on 3L  -Will eventually need diuresis.   - vanc and zosyn for pneumonia narrowed down to ceftriaxone   -Follow up culture results    C/V:  1) Afib with RVR  2) Hypotension, ?Septic versus rate-related  -Phenylephrine for MAP<65  -Diltiazem and Metoprolol standing  -Digoxin  -Appreciate cardiology input.   -Anticoagulated with Apixaban.      GI:  1) Transaminitis, unclear etiology.  RUQ US is normal.  - Trend LFTs.  - discontinue Zofran   - continue bowel regimen     Renal:  No issues     Neuro:  No issues     ID:  1) E.Coli and strep pneumoniae in sputum  2) empyema with gram positive in pleural fluid  -continue ceftriaxone   -Will need effusion drained completely            Diet: Fluid restriction 1800 ML FLUID  Regular Diet Adult    DVT Prophylaxis: DOAC  Chairez Catheter: Not present  Fluids: PO  Lines: PRESENT      PICC 08/10/24 Triple Lumen Right Brachial vein medial-Site Assessment: WDL      Cardiac Monitoring: ACTIVE order. Indication: Tachyarrhythmias, acute (48  hours)  Code Status: Full Code      Clinically Significant Risk Factors              # Hypoalbuminemia: Lowest albumin = 2.6 g/dL at 8/12/2024  5:02 AM, will monitor as appropriate     # Hypertension: Noted on problem list               # Financial/Environmental Concerns: none   # Pacemaker present             Disposition Plan      Expected Discharge Date: 08/19/2024    Discharge Delays: IV Medication - consider oral or Home Infusion  Destination: home with help/services;home with family  Discharge Comments: icu status, HR control on amio gtt, deon gtt, steroids, chest tube, BiPap intermittently        The patient's care was discussed with the Attending Physician, Dr. Carmelita Buckley .    Rosaura Johnson MD PGY-1  Hospitalist Service  [facility]  Securely message with [employer] (more info)  Text page via [employer] Paging/Directory   ______________________________________________________________________    Interval History   No new acute event overnight.      Physical Exam   Vital Signs: Temp: 97.6  F (36.4  C) Temp src: Oral BP: 123/67 Pulse: 75   Resp: 18 SpO2: 93 % O2 Device: Nasal cannula with humidification Oxygen Delivery: 3 LPM  Weight: 146 lbs 12.8 oz    Gen: awake, alert, mild respiratory distress   HEENT: pale conjunctiva, dry mucosa,   Neck: no thyromegaly, masses or JVD  Lungs: decrease breath sounds right base, wheezes both HT  CV: irregular, no murmurs or gallops appreciated  Abdomen: soft, NT, BS wnl  Ext: no edema  Neuro: awake, alert     Medical Decision Making       Please see A&P for additional details of medical decision making.      Data     I have personally reviewed the following data over the past 24 hrs:    N/A  \   N/A   / N/A     N/A N/A N/A /  91   4.3 N/A N/A \     ALT: N/A AST: N/A AP: N/A TBILI: N/A   ALB: N/A TOT PROTEIN: N/A LIPASE: N/A       Imaging results reviewed over the past 24 hrs:   Recent Results (from the past 24 hour(s))   CT Chest w/o Contrast    Narrative     EXAM: CT CHEST W/O CONTRAST  LOCATION: [facility]  DATE: 8/13/2024    INDICATION: empyema s p chest tube, ?evacuated  COMPARISON: 8/10/2024  TECHNIQUE: CT chest without IV contrast. Multiplanar reformats were obtained. Dose reduction techniques were used.  CONTRAST: None.    FINDINGS:   LUNGS AND PLEURA: Right basilar pigtail pleural drain in place. Large right pleural effusion with complete atelectasis of the right lung. A few foci of gas in the pleural space. Small left pleural effusion. Emphysema. Mild frothy secretions in trachea.   Debris occludes the central right lung airways.    MEDIASTINUM/AXILLAE: Stable left subclavian approach pacemaker and TAVR. Right PICC with tip near the cavoatrial junction. Rightward mediastinal shift. No lymphadenopathy.    CORONARY ARTERY CALCIFICATION: Moderate.    UPPER ABDOMEN: Unchanged thickening of the left adrenal gland.    MUSCULOSKELETAL: Unremarkable      Impression    IMPRESSION:   1.  Large right pleural effusion with right lung collapse.

## 2024-08-14 NOTE — PLAN OF CARE
Problem: Gas Exchange Impaired  Goal: Optimal Gas Exchange  Outcome: Not Progressing     Problem: Fall Injury Risk  Goal: Absence of Fall and Fall-Related Injury  Outcome: Progressing  Intervention: Promote Injury-Free Environment  Recent Flowsheet Documentation  Taken 8/14/2024 1316 by Renetta Millan, RN  Safety Promotion/Fall Prevention: safety round/check completed  Taken 8/14/2024 1200 by Renetta Millan RN  Safety Promotion/Fall Prevention:   safety round/check completed   nonskid shoes/slippers when out of bed   room near nurse's station   room organization consistent  Taken 8/14/2024 0900 by Renetta Millan, RN  Safety Promotion/Fall Prevention:   safety round/check completed   nonskid shoes/slippers when out of bed   room near nurse's station   room organization consistent  Taken 8/14/2024 0800 by Renetta Millan, RN  Safety Promotion/Fall Prevention:   safety round/check completed   nonskid shoes/slippers when out of bed   room near nurse's station   room organization consistent   Goal Outcome Evaluation:  Pt remains on 4l to 6L per n/c, goal SpO2 88-92%. Lung sounds very diminished, especially on right side;  Chest tube in place; TPA per MD order but no output after in dwell 1 hr/ stopcock open to drain and CT to -20cm wall sxn.  Dr Buckley aware; pt has frequent productive cough of thick tan also; continue to monitor

## 2024-08-15 ENCOUNTER — APPOINTMENT (OUTPATIENT)
Dept: SPEECH THERAPY | Facility: CLINIC | Age: 74
DRG: 871 | End: 2024-08-15
Payer: COMMERCIAL

## 2024-08-15 ENCOUNTER — APPOINTMENT (OUTPATIENT)
Dept: RADIOLOGY | Facility: CLINIC | Age: 74
DRG: 871 | End: 2024-08-15
Attending: INTERNAL MEDICINE
Payer: COMMERCIAL

## 2024-08-15 ENCOUNTER — ANESTHESIA EVENT (OUTPATIENT)
Dept: SURGERY | Facility: HOSPITAL | Age: 74
DRG: 166 | End: 2024-08-15
Payer: COMMERCIAL

## 2024-08-15 LAB
BACTERIA BLD CULT: NO GROWTH
DIGOXIN SERPL-MCNC: 0.6 NG/ML (ref 0.6–2)
GLUCOSE BLDC GLUCOMTR-MCNC: 109 MG/DL (ref 70–99)
GLUCOSE BLDC GLUCOMTR-MCNC: 112 MG/DL (ref 70–99)
GLUCOSE BLDC GLUCOMTR-MCNC: 178 MG/DL (ref 70–99)
GLUCOSE BLDC GLUCOMTR-MCNC: 209 MG/DL (ref 70–99)
MAGNESIUM SERPL-MCNC: 2.3 MG/DL (ref 1.7–2.3)
P JIROVECII DNA SPEC QL NAA+PROBE: NOT DETECTED
POTASSIUM SERPL-SCNC: 4.1 MMOL/L (ref 3.4–5.3)

## 2024-08-15 PROCEDURE — 84132 ASSAY OF SERUM POTASSIUM: CPT | Performed by: STUDENT IN AN ORGANIZED HEALTH CARE EDUCATION/TRAINING PROGRAM

## 2024-08-15 PROCEDURE — 99222 1ST HOSP IP/OBS MODERATE 55: CPT | Mod: FS

## 2024-08-15 PROCEDURE — 83735 ASSAY OF MAGNESIUM: CPT | Performed by: STUDENT IN AN ORGANIZED HEALTH CARE EDUCATION/TRAINING PROGRAM

## 2024-08-15 PROCEDURE — 99222 1ST HOSP IP/OBS MODERATE 55: CPT | Mod: FS | Performed by: SURGERY

## 2024-08-15 PROCEDURE — 250N000009 HC RX 250: Performed by: INTERNAL MEDICINE

## 2024-08-15 PROCEDURE — 71045 X-RAY EXAM CHEST 1 VIEW: CPT

## 2024-08-15 PROCEDURE — 99233 SBSQ HOSP IP/OBS HIGH 50: CPT | Performed by: INTERNAL MEDICINE

## 2024-08-15 PROCEDURE — 120N000004 HC R&B MS OVERFLOW

## 2024-08-15 PROCEDURE — 250N000011 HC RX IP 250 OP 636: Mod: JZ | Performed by: INTERNAL MEDICINE

## 2024-08-15 PROCEDURE — 92526 ORAL FUNCTION THERAPY: CPT | Mod: GN

## 2024-08-15 PROCEDURE — 99233 SBSQ HOSP IP/OBS HIGH 50: CPT | Performed by: STUDENT IN AN ORGANIZED HEALTH CARE EDUCATION/TRAINING PROGRAM

## 2024-08-15 PROCEDURE — 250N000013 HC RX MED GY IP 250 OP 250 PS 637: Performed by: INTERNAL MEDICINE

## 2024-08-15 PROCEDURE — 258N000003 HC RX IP 258 OP 636: Performed by: INTERNAL MEDICINE

## 2024-08-15 PROCEDURE — 99232 SBSQ HOSP IP/OBS MODERATE 35: CPT | Performed by: INTERNAL MEDICINE

## 2024-08-15 PROCEDURE — 80162 ASSAY OF DIGOXIN TOTAL: CPT | Performed by: INTERNAL MEDICINE

## 2024-08-15 RX ORDER — LIDOCAINE 40 MG/G
CREAM TOPICAL
Status: CANCELLED | OUTPATIENT
Start: 2024-08-15

## 2024-08-15 RX ORDER — CEFAZOLIN SODIUM 2 G/100ML
2 INJECTION, SOLUTION INTRAVENOUS SEE ADMIN INSTRUCTIONS
Status: CANCELLED | OUTPATIENT
Start: 2024-08-15

## 2024-08-15 RX ORDER — SODIUM CHLORIDE, SODIUM LACTATE, POTASSIUM CHLORIDE, CALCIUM CHLORIDE 600; 310; 30; 20 MG/100ML; MG/100ML; MG/100ML; MG/100ML
INJECTION, SOLUTION INTRAVENOUS CONTINUOUS
Status: CANCELLED | OUTPATIENT
Start: 2024-08-15

## 2024-08-15 RX ORDER — CEFAZOLIN SODIUM 2 G/100ML
2 INJECTION, SOLUTION INTRAVENOUS
Status: CANCELLED | OUTPATIENT
Start: 2024-08-15

## 2024-08-15 RX ADMIN — METOPROLOL TARTRATE 50 MG: 50 TABLET, FILM COATED ORAL at 21:22

## 2024-08-15 RX ADMIN — CEFTRIAXONE SODIUM 2 G: 2 INJECTION, POWDER, FOR SOLUTION INTRAMUSCULAR; INTRAVENOUS at 17:05

## 2024-08-15 RX ADMIN — METOPROLOL TARTRATE 50 MG: 50 TABLET, FILM COATED ORAL at 00:52

## 2024-08-15 RX ADMIN — SUCRALFATE 1 G: 1 TABLET ORAL at 11:33

## 2024-08-15 RX ADMIN — ATORVASTATIN CALCIUM 20 MG: 10 TABLET, FILM COATED ORAL at 21:21

## 2024-08-15 RX ADMIN — SENNOSIDES 1 TABLET: 8.6 TABLET, FILM COATED ORAL at 21:22

## 2024-08-15 RX ADMIN — SUCRALFATE 1 G: 1 TABLET ORAL at 17:06

## 2024-08-15 RX ADMIN — DIGOXIN 125 MCG: 125 TABLET ORAL at 09:55

## 2024-08-15 RX ADMIN — GABAPENTIN 600 MG: 600 TABLET, FILM COATED ORAL at 21:22

## 2024-08-15 RX ADMIN — DILTIAZEM HYDROCHLORIDE 120 MG: 120 CAPSULE, COATED, EXTENDED RELEASE ORAL at 11:32

## 2024-08-15 RX ADMIN — DORNASE ALFA 50 ML: 1 SOLUTION RESPIRATORY (INHALATION) at 21:52

## 2024-08-15 RX ADMIN — ACETAMINOPHEN 650 MG: 325 TABLET ORAL at 17:20

## 2024-08-15 RX ADMIN — METOPROLOL TARTRATE 50 MG: 50 TABLET, FILM COATED ORAL at 11:33

## 2024-08-15 RX ADMIN — GABAPENTIN 600 MG: 600 TABLET, FILM COATED ORAL at 13:51

## 2024-08-15 ASSESSMENT — ACTIVITIES OF DAILY LIVING (ADL)
ADLS_ACUITY_SCORE: 45

## 2024-08-15 NOTE — PROVIDER NOTIFICATION
MD notified regarding: Pt BP 96/53 and was 103/57 when rechecking 10 minutes later. Do you want the metoprolol given? Has hold parameters for SBP <100 so just figured I would double check. Thank you! Lilliam MCCRACKEN RN [id number]    MD response: we can give if then! Thanks for checking

## 2024-08-15 NOTE — PROGRESS NOTES
[facility]    Medicine Progress Note - Hospitalist Service    Date of Admission:  8/10/2024    Assessment & Plan      Mary Kay Tejada is a 74 year old female admitted on 8/10/2024. She has a pmhx of paroxysmal atrial flutter on apixaban, COPD, hyperlipidemia, diabetes mellitus type 2 with neuropathy, reflux, dizziness, tobacco use, fibromyalgia and chronic pain syndrome, prior pleural effusions and most recent admission in July found with E. coli pneumonia and a right sided pleural effusion which was consistent with a transudate and negative cultures.      Presentation of shortness of breath progressive over several days/week with acute worsening on day of admission.  Arrived to ER hypotensive MAP 60s and tachycardia 150s. Found with large right sided pleural effusion and respiratory acidosis and s/p bedside pigtail catheter chest tube placement in ER. On 6L then on bipap. HR then 70s. Fluid studies sent. BNP 8987, wbc 13.1, hgb 11.4. Started on zosyn, continued. Given lasix 60mg iv in total (40mg x1 and then 20mg q8 hrs). Continued with q8 hrs gentle diuresis.  Chest tube was noted to have its tip at the right apex with plan for IR evaluation and RE-positioning.  IR consulted but unavailable so plan was for potential transfer; overnight low blood pressures ultimately started on norepinephrine, transferred to ICU, additional vasopressin was added. ICU/Pulmonary following. On 8/11 Pulm/ICU repositioned Chest Tube directly at bedside along with a subsequent bronchoscopy (showed oral candidiasis, hyperemic mucosa, and mild thick secretions, no endobronchial lesions).  Patient went into in aflutter and started on amiodarone drip and Cardiology consulted.  Speech consulted.  Respiratory aerobic cultures now growing E.Coli and Strep Pneumo. On 8/12 started digoxin, attempt to wean off of amiodarone.  Increased insulin sliding scale. Steroids per pulmonary, taper now (decrease by 10mg q3  days).    On 8/13, discontinued amiodarone drip given the prolonged QT, resumed metoprolol diltiazem with parameters. Avoid QTc prolonging medications.  Remained off pressors. Added PT and OT for eventual dispo. (TCU) Repeat CT shows large right pleural effusion with right lung collapse and plan is continue chest tube. Video study 8/14; transitioned to oral digoxin.  As such, intrapleural lytics were started.  On 8/15, Surgery is consulted for VATS and decortication.     Will need diuresis when further stable. Updated pt's  and son Heriberto. Writer will be off daytime service tomorrow. Of note, pt has EP appointment on 9/9/24.     -------------  #Sepsis with circulatory shock, secondary to empyema and pneumonia, with E. 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 Appreciate pulm + cardiology + speech and forthecoming Surgery for vats+decortication   Plan:  -Appreciate all consultants   -Apixaban resumed - hold if further procedure   -diuresis when stable, not yet, defer to cardiology  --Scheduled DuoNebs and RT   -Saline nebulizers  -Speech evaluated  -Continue ceftriaxone (de-escalate from zosyn and vancomycin)   -I's and O's and weights  -steroids per pulmonary, tapering now  -fluid Restrict 1.8 L     #paroxysmal atrial flutter on apixaban  A- on arrival 150s but on admit was 70s-80s; antihypertensives and diuresis were held as the patient ultimately required pressors overnight. On 8/11 in atrial flutter and amiodarone was started.  Cardiology consulted. Given digoxin IV now po. Appreciate.  EP appointment on 9/9/24  P:  -cardiology consulted, greatly appreciate   - avoid qt-prolonging medications  -metoprolol and diltiazem and po digoxin and see EP on 9/9    Hyperlipidemia  A/p- continue statin    diabetes mellitus  "type 2 with neuropathy  A/p- hold jardiance, ldssi and hypoglycemia protocol on admit  - titrated further. Labile with steroids.   - continue HDSSI  -hypoglycemia protocol     reflux  A/p- stable, auto-sub ppi     fibromyalgia and chronic pain syndrome  A/p- hold prior pain meds w/c for respiratory depression; tylenol for now    Hx of TAVR  Hx of HFpEF  A/p- see primary issue, continue diuresis as above for now, I/Os and weights; cardiology consulted; will nee diuresis when hemodynamically stable    Prolonged Qtc  Sinus node dysfunction s/p pacemaker   A/p- appreciate cardiology following, avoid qt-prolonging medications    Chronic constipation  A/p- stable, continue prn PTA meds             Diet: Fluid restriction 1800 ML FLUID  NPO per Anesthesia Guidelines for Procedure/Surgery Except for: Meds    DVT Prophylaxis: DOAC, Pneumatic Compression Devices, and Ambulate every shift  Chairez Catheter: Not present  Lines: PRESENT      PICC 08/10/24 Triple Lumen Right Brachial vein medial-Site Assessment: WDL      Cardiac Monitoring: ACTIVE order. Indication: Tachyarrhythmias, acute (48 hours)  Code Status: Full Code      Clinically Significant Risk Factors              # Hypoalbuminemia: Lowest albumin = 2.6 g/dL at 8/12/2024  5:02 AM, will monitor as appropriate       # Hypertension: Noted on problem list           # Overweight: Estimated body mass index is 26.11 kg/m  as calculated from the following:    Height as of this encounter: 1.651 m (5' 5\").    Weight as of this encounter: 71.2 kg (156 lb 14.4 oz).      # Financial/Environmental Concerns: none   # Pacemaker present             Disposition Plan     Medically Ready for Discharge: Anticipated in 2-4 Days               Rayo Mendoza MD  Hospitalist Service  [facility]  Securely message with [employer] (more info)  Text page via [employer] Paging/Directory   ______________________________________________________________________    Interval History "   Eriberto  Updated Heriberto on phone yesterday evening; today both he and pt's  are here, answered all of their Qs  Pt feels about the same, Pt has no new pain or symptoms and was disappointed to hear she may need further procedures  Discussed w/ RN and sw      Physical Exam   Vital Signs: Temp: 97.7  F (36.5  C) Temp src: Oral BP: 112/62 Pulse: 77   Resp: 20 SpO2: 93 % O2 Device: Nasal cannula with humidification Oxygen Delivery: 4 LPM  Weight: 156 lbs 14.4 oz    General: alert, oriented and in NAD  Pulmonary: coarse breath sounds, decreased at bases, on 4L NC; chest tube intact  CVS: irregular, no murmurs, rubs, or gallops; no blatant jugular venous distention; no extremity edema and extremities are warm to the touch  GI: soft, nontender, BS+, no rebound or guarding, no conspicuous organomegaly   Neuro: nonfocal, moves all extremities of own volition  Psych: appropriate      Medical Decision Making       56 MINUTES SPENT BY ME on the date of service doing chart review, history, exam, documentation & further activities per the note.      Data   ------------------------- PAST 24 HR DATA REVIEWED -----------------------------------------------    I have personally reviewed the following data over the past 24 hrs:    N/A  \   N/A   / N/A     N/A N/A N/A /  294 (H)   4.1 N/A N/A \     ALT: N/A AST: N/A AP: N/A TBILI: N/A   ALB: N/A TOT PROTEIN: N/A LIPASE: N/A       Imaging results reviewed over the past 24 hrs:   Recent Results (from the past 24 hour(s))   XR Video Swallow with SLP or OT    Narrative    EXAM: XR VIDEO SWALLOW WITH SLP OR OT  LOCATION: [facility]  DATE: 8/14/2024    INDICATION: Difficulty swallowing.  COMPARISON: None.    TECHNIQUE: Routine swallow study with speech pathology using multiple barium thicknesses.    RADIATION DOSE: Dose Area Product 23.86 microGy-m2    FINDINGS:   Swallow study with Speech Pathology using multiple barium thicknesses.     Penetration with thin  liquids, which slightly improved with chin tuck maneuver and decreased swallow volumes. No definite aspiration visualized with trialed consistencies.      Impression    IMPRESSION:  Penetration with thin liquids, however no aspiration visualized. Please see speech pathology report for further details and recommendations.

## 2024-08-15 NOTE — PROGRESS NOTES
"Care Management Follow Up    Length of Stay (days): 5    Expected Discharge Date: 08/19/2024     Concerns to be Addressed: discharge planning     Patient plan of care discussed at interdisciplinary rounds: Yes    Anticipated Discharge Disposition: Home, Home Care     Anticipated Discharge Services: None  Anticipated Discharge DME: None    Patient/family educated on Medicare website which has current facility and service quality ratings: yes  Education Provided on the Discharge Plan: Yes  Patient/Family in Agreement with the Plan: yes    Referrals Placed by CM/SW: Homecare  Private pay costs discussed: Not applicable    Additional Information:    9:59 AM  Pt not medically ready for discharge yet.    [facility] - has clinically accepted this pt.    Pt is accepting of [facility].    Son Terrell - [phone] - Updated.  Terrell inquired about homecare services.  Reviewed homecare with Terrell.  For now, proceed with plans for TCU, per Terrell.     Per CM consult note 8/10: \"Pt resides in the home of her son and daughter-in-law Cynthia who is also pt's PCA. Family assists with I/ADL support both as family and as PCA as needed. No additional in-home services identified. Home O2 and ambulatory DME utilized. No concerns identified with eventual improvement and return to home.\"     [facility] - Samantha FRANCO -[phone] - reports pt is enrolled in [email or web address] program.  Pt receives PCA 2.25hours/day and HMK (homemaking) 3 hours/week.  CM to update Samantha of discharge plans.    Needs PAS.    Transportation TBD.    CM will continue to follow.     Roland Barakat RN    "

## 2024-08-15 NOTE — PROGRESS NOTES
PULMONARY / CRITICAL CARE PROGRESS NOTE    Date / Time of Admission:  8/10/2024  9:18 AM    Assessment:     Mary Kay Tejada is a 74 year old female with history of HTN, CAD, HFpEF, afib on DOAC, aortic stenosis s/p TAVR, s/p pacemaker, COPD, tobacco user.   Recent admission 7/12 to 7/17 with acute hypercarbic respiratory failure, pneumonia and COVI19 viral infection. CT scan showed RUL collapse, right effusion. Sputum Cx (+) E coli. Developed pneumothorax post right side thoracentesis. Improvement of pneumothorax in serial CXR. Discharged home on O2 supplementation 3 LPM, and Abx and follow up in clinic.   Presents to ED on 8/10/2024 for evaluation of shortness of breath. Patient was in moderate respiratory distress, hypotensive, hypoxic.   Labs showed elevated LFTs, elevated WBC, negative procalcitonin. Acute on chronic respiratory acidosis. CXR showed large right pleural effusion. Chest tube was placed on admission. Chest CT scan showed RUL and RLL atelectasis, debris in airway and moderate pleural effusion.   Admitted to ICU , treated for pneumonia. Required pressors.   Underwent reposition chest tube on 8/11 and diagnostic bronchoscopy. No endobronchial lesions. Developed a flutter with RVR, started on amiodarone drip, later discontinued due to prolonged QT. Patient tolerated digoxin and diltiazem.   Pleural fluid (+) strep pneumonia. Follow up chest CT scan showed loculated effusion, started on intrapleural lytics. Follow up CXR showed large right effusion.      Acute on chronic respiratory failure   Empyema  S/p chest tube 8/10 and reposition on 8/11 with resolution of pleural effusion.   Pleural fluid culture is growing Strep pneumonia.   Follow up CT scan 8/13 showed re accumulation of pleural effusion, loculated effusion.   Started on intrapleural lytics.   Follow up CXR 8/15 showed large right effusion with total opacification of right hemithorax   Surgery consult for VATS and decortication.    Pneumonia    CT scan showed debris in airway. Chronic collapse right upper lobe. Atelectasis RLL.   S/p diagnostic bronchoscopy 8/11, oral candidiasis, hyperemic mucosa, and mild thick secretions, no endobronchial lesions. BAL RUL and RLL were done.  BAL grew E coli. Sputum Cx grew E coli and strep pneumonia.   Diet per speech therapy.   COPD exacerbation  Steroid taper. Continue schedule bronchodilators   Resolved a fib / flutter with RVR  Cardiology is following. Off amiodarone due to prolonged QT interval.   On digoxin, metoprolol and diltiazem  Patient is anticoagulated.   Oral candidiasis   HTN, CAD, HFpEF, aortic stenosis s/p TAVR, s/p pacemaker  Tobacco user    Advance Directives:  Full code    Plan:   Titrate FiO2 to keep SpO2 > 90%, currently on nasal cannula 4 LPM  Schedule bronchodilators  Saline nebs   Taper systemic steroids  Surgery consult for VATS and decortication  Abx ceftriaxone   Oral nystatin swish and swallow , oral care.   Titrate BP meds  Cardiology service is following   Monitor kidney function   Supplement electrolytes as needed  Diet per speech therapy   Glucose level monitoring   DVT prophylaxis , apixaban on hold for now    Please contact me if you have any questions.    Modesto Joseph  Pulmonary / Critical Care  08/15/2024  9:16 AM      Subjective   HPI:  Mary Kay Tejada is a 74 year old female with history of HTN, CAD, HFpEF, afib on DOAC, aortic stenosis s/p TAVR, s/p pacemaker, COPD, tobacco user.   Recent admission 7/12 to 7/17 with acute hypercarbic respiratory failure, pneumonia and COVI19 viral infection. CT scan showed RUL collapse, right effusion. Sputum Cx (+) E coli. Developed pneumothorax post right side thoracentesis. Improvement of pneumothorax in serial CXR. Discharged home on O2 supplementation 3 LPM, and Abx and follow up in clinic.   Presents to ED on 8/10/2024 for evaluation of shortness of breath. Patient was in moderate respiratory distress, hypotensive,  hypoxic.   Labs showed elevated LFTs, elevated WBC, negative procalcitonin. Acute on chronic respiratory acidosis. CXR showed large right pleural effusion. Chest tube was placed on admission. Chest CT scan showed RUL and RLL atelectasis, debris in airway and moderate pleural effusion.   Admitted to ICU , treated for pneumonia. Required pressors.   Underwent reposition chest tube on 8/11 and diagnostic bronchoscopy. No endobronchial lesions. Developed a flutter with RVR, started on amiodarone drip, later discontinued due to prolonged QT. Patient tolerated digoxin and diltiazem.   Pleural fluid (+) strep pneumonia. Follow up chest CT scan showed loculated effusion, started on intrapleural lytics. Follow up CXR showed large right effusion.     Events overnight  - Started on intrapleural lytics. No significant chest tube output.   - Follow up CXR showed large right effusion.     Allergies: Celebrex [celecoxib] and Latex     MEDS:  Current Facility-Administered Medications   Medication Dose Route Frequency Provider Last Rate Last Admin    acetaminophen (TYLENOL) tablet 650 mg  650 mg Oral Q4H PRN Carmelita Buckley MD   650 mg at 08/12/24 0218    Or    acetaminophen (TYLENOL) Suppository 650 mg  650 mg Rectal Q4H PRN Carmelita Buckley MD        [Held by provider] albuterol (PROVENTIL HFA/VENTOLIN HFA) inhaler  2 puff Inhalation Q4H PRN Rayo Mendoza MD        alteplase (ACTIVASE) 10 mg, dornase jacki (PULMOZYME) 5 mg in sodium chloride 0.9 % 50 mL for chest tube instillation in syringe  50 mL Chest Tube BID Carmelita Buckley MD   50 mL at 08/14/24 2219    apixaban ANTICOAGULANT (ELIQUIS) tablet 5 mg  5 mg Oral BID Carmelita Buckley MD   5 mg at 08/14/24 2057    artificial saliva (BIOTENE MT) solution 1 spray  1 spray Mouth/Throat 4x Daily Carmelita Buckley MD   1 spray at 08/13/24 0815    atorvastatin (LIPITOR) tablet 20 mg  20 mg Oral At Bedtime Carmelita Buckley MD   20 mg at 08/14/24 2102    benzocaine-menthol (CEPACOL) 15-3.6 MG lozenge 1  lozenge  1 lozenge Buccal Q1H PRN Carmelita Buckley MD        calcium carbonate (TUMS) chewable tablet 1,000 mg  1,000 mg Oral 4x Daily PRN Carmelita Buckley MD        cefTRIAXone (ROCEPHIN) 2 g vial to attach to  ml bag for ADULTS or NS 50 ml bag for PEDS  2 g Intravenous Q24H Carmelita Buckley MD   2 g at 08/14/24 1722    glucose gel 15-30 g  15-30 g Oral Q15 Min PRN Carmelita Buckley MD        Or    dextrose 50 % injection 25-50 mL  25-50 mL Intravenous Q15 Min PRN Carmelita Buckley MD        Or    glucagon injection 1 mg  1 mg Subcutaneous Q15 Min PRN Carmelita Buckley MD        digoxin (LANOXIN) tablet 125 mcg  125 mcg Oral Daily Carmelita Buckley MD   125 mcg at 08/14/24 0810    diltiazem ER COATED BEADS (CARDIZEM CD/CARTIA XT) 24 hr capsule 120 mg  120 mg Oral Daily Carmelita Buckley MD   120 mg at 08/14/24 0810    fluticasone-vilanterol (BREO ELLIPTA) 200-25 MCG/ACT inhaler 1 puff  1 puff Inhalation Daily Carmelita Buckley MD   1 puff at 08/14/24 0811    [Held by provider] furosemide (LASIX) injection 20 mg  20 mg Intravenous Q8H Rayo Mendoza MD   20 mg at 08/10/24 1345    gabapentin (NEURONTIN) tablet 600 mg  600 mg Oral TID Carmelita Buckley MD   600 mg at 08/14/24 2056    insulin aspart (NovoLOG) injection (RAPID ACTING)  1-10 Units Subcutaneous TID AC Rayo Mendoza MD        insulin aspart (NovoLOG) injection (RAPID ACTING)  1-7 Units Subcutaneous At Bedtime Rayo Mendoza MD        ipratropium - albuterol 0.5 mg/2.5 mg/3 mL (DUONEB) neb solution 3 mL  3 mL Nebulization Q4H PRN Carmelita Buckley MD   3 mL at 08/13/24 1333    lidocaine (LMX4) cream   Topical Q1H PRN Carmelita Buckley MD        lidocaine 1 % 0.1-1 mL  0.1-1 mL Other Q1H PRN Carmelita Buckley MD        melatonin tablet 1 mg  1 mg Oral At Bedtime PRN Carmelita Buckley MD        metoprolol tartrate (LOPRESSOR) tablet 50 mg  50 mg Oral BID Carmelita Buckley MD   50 mg at 08/15/24 0052    miconazole (MICATIN) 2 % powder   Topical TID PRN Carmelita Buckley MD        naloxone (NARCAN) injection 0.2  mg  0.2 mg Intravenous Q2 Min PRN Carmelita Buckley MD        naloxone (NARCAN) injection 0.2 mg  0.2 mg Intramuscular Q2 Min PRN Carmelita Buckley MD        naloxone (NARCAN) injection 0.4 mg  0.4 mg Intravenous Q2 Min PRN Carmelita Buckley MD        naloxone (NARCAN) injection 0.4 mg  0.4 mg Intramuscular Q2 Min PRN Carmelita Buckley MD        pantoprazole (PROTONIX) EC tablet 40 mg  40 mg Oral QAM AC Carmelita Buckley MD   40 mg at 08/14/24 0641    polyethylene glycol (MIRALAX) Packet 17 g  17 g Oral Daily PRN Carmelita Buckley MD   17 g at 08/14/24 0810    predniSONE (DELTASONE) tablet 40 mg  40 mg Oral Daily Carmelita Buckley MD   40 mg at 08/14/24 0810    prochlorperazine (COMPAZINE) injection 5 mg  5 mg Intravenous Q6H PRN Carmelita Buckley MD        Or    prochlorperazine (COMPAZINE) tablet 5 mg  5 mg Oral Q6H PRN Carmelita Buckley MD        Or    prochlorperazine (COMPAZINE) suppository 12.5 mg  12.5 mg Rectal Q12H PRN Carmelita Buckley MD        senna-docusate (SENOKOT-S/PERICOLACE) 8.6-50 MG per tablet 1 tablet  1 tablet Oral BID PRN Carmelita Buckley MD   1 tablet at 08/12/24 0220    Or    senna-docusate (SENOKOT-S/PERICOLACE) 8.6-50 MG per tablet 2 tablet  2 tablet Oral BID PRN Carmelita Buckley MD        sennosides (SENOKOT) tablet 1 tablet  1 tablet Oral BID Carmelita Buckley MD   1 tablet at 08/14/24 2056    sodium chloride (PF) 0.9% PF flush 10-20 mL  10-20 mL Intracatheter q1 min prn Carmelita Buckley MD        sodium chloride (PF) 0.9% PF flush 10-40 mL  10-40 mL Intracatheter Q7 Days Carmelita Buckley MD        sodium chloride (PF) 0.9% PF flush 10-40 mL  10-40 mL Intracatheter Q1H PRN Carmelita Buckley MD        sodium chloride (PF) 0.9% PF flush 3 mL  3 mL Intracatheter Q8H Carmelita Buckley MD   3 mL at 08/15/24 0038    sodium chloride (PF) 0.9% PF flush 3 mL  3 mL Intracatheter q1 min prn Carmelita Buckley MD        sucralfate (CARAFATE) tablet 1 g  1 g Oral TID AC Carmelita Buckley MD   1 g at 08/14/24 1611     Objective:   VITALS:  /63 (BP Location: Left arm)  "  Pulse 77   Temp 98.2  F (36.8  C) (Oral)   Resp 18   Ht 1.651 m (5' 5\")   Wt 71.2 kg (156 lb 14.4 oz)   LMP  (LMP Unknown)   SpO2 93%   BMI 26.11 kg/m    VENT:  Resp: 18    EXAM:   Gen: sleepy, arousable, mild respiratory distress  HEENT: pale conjunctiva, dry mucosa, Mallampati II/IV  Neck: no thyromegaly, masses or JVD  Lungs: decrease breath sounds right lung , discrete ronchi left lung  CV: irregular, no murmurs or gallops appreciated  Abdomen: soft, NT, BS wnl  Ext: no edema  Neuro: sleepy, arousable, non focal       Data Review:  Recent Labs   Lab 08/15/24  0735 08/14/24  2103 08/14/24  1706 08/14/24  1217 08/14/24  0726 08/13/24  2102   * 294* 203* 159* 91 284*     SPUTUM GRAM STAIN  <10 Squamous epithelial cells/low power field      >25 PMNs/low power field      3+ Gram positive cocci      08/13/24 05:13 08/14/24 05:12 08/15/24 05:49   Sodium 139     Potassium 4.4 4.3 4.1   Chloride 101     Carbon Dioxide (CO2) 33 (H)     Urea Nitrogen 26.6 (H)     Creatinine 0.68     GFR Estimate >90     Calcium 9.2     Anion Gap 5 (L)     Magnesium 2.5 (H) 2.4 (H) 2.3   Albumin 2.8 (L) 3.0 (L)    Protein Total 5.9 (L) 5.7 (L)    Alkaline Phosphatase 171 (H) 154 (H)     (H) 107 (H)    AST 54 (H) 26    Bilirubin Direct <0.20 <0.20    Bilirubin Total 0.3 0.3    Calcium Ionized Whole Blood 5.1        08/12/24 05:02 08/13/24 05:13   WBC 12.0 (H) 8.6   Hemoglobin 9.0 (L) 9.3 (L)   Hematocrit 31.0 (L) 31.9 (L)   Platelet Count 220 174   RBC Count 3.64 (L) 3.78 (L)   MCV 85 84   MCH 24.7 (L) 24.6 (L)   MCHC 29.0 (L) 29.2 (L)     Sputum Culture 8/10 2+ Streptococcus pneumoniae Abnormal       1+ Escherichia coli Abnormal           BAL Culture 8/11 2+ Normal anna marie      1+ Escherichia coli Abnormal    Susceptibilities done on previous cultures        Pleural fluid Culture 8/10 1+ Streptococcus pneumoniae Abnormal          Gram Stain quantification of host cells and microbiological organisms was done on a " cytocentrifuged preparation.            Gram Stain Result No organisms seen      4+ WBC seen   Predominantly PMNs           Susceptibility     Streptococcus pneumoniae     TAWNYA     Azithromycin <=0.25 ug/mL Susceptible     Ceftriaxone (meningitis) <=0.25 ug/mL Susceptible     Ceftriaxone (non-meningitis) <=0.25 ug/mL Susceptible     Clindamycin <=0.06 ug/mL Susceptible     Levofloxacin 1 ug/mL Susceptible     Penicillin (meningitis) <=0.03 ug/mL Susceptible     Penicillin (non-meningitis) <=0.03 ug/mL Susceptible     Penicillin(oral) <=0.03 ug/mL Susceptible     Vancomycin 0.25 ug/mL Susceptible                XR CHEST PORT 1 VIEW  LOCATION: [facility]  DATE: 8/10/2024   INDICATION: sob, hypoxic, history of prior ptx, eval for ptx, pna or edema  COMPARISON: 7/17/2020  IMPRESSION: Large right pleural effusion. No pneumothorax. Left subclavian approach pacing device. Prosthetic aortic valve. Cardiac silhouette within normal limits. No acute osseous abnormality.    XR CHEST 1 VIEW  LOCATION: [facility]  DATE: 08/11/2024   INDICATION: New chest tube placement.  COMPARISON: 08/10/2024 CXR and CT chest.  IMPRESSION: Interval placement right basilar pleural drainage catheter. Previously seen large right pleural effusion has been nearly completely evacuated and the mid and lower right lung have partially reexpanded. No pneumothorax. Left lung clear. Mild   cardiac enlargement. Transcatheter aortic valve replacement. Pacer anterior left chest wall with lead tips in the RA and RV. Right PICC tip RA/SVC junction.    CT CHEST W/O CONTRAST  LOCATION: [facility]  DATE: 8/13/2024  INDICATION: empyema s p chest tube, ?evacuated  COMPARISON: 8/10/2024  FINDINGS:   LUNGS AND PLEURA: Right basilar pigtail pleural drain in place. Large right pleural effusion with complete atelectasis of the right lung. A few foci of gas in the pleural space. Small left pleural  effusion. Emphysema. Mild frothy secretions in trachea. Debris occludes the central right lung airways.  MEDIASTINUM/AXILLAE: Stable left subclavian approach pacemaker and TAVR. Right PICC with tip near the cavoatrial junction. Rightward mediastinal shift. No lymphadenopathy.  CORONARY ARTERY CALCIFICATION: Moderate.  UPPER ABDOMEN: Unchanged thickening of the left adrenal gland.   MUSCULOSKELETAL: Unremarkable  IMPRESSION:   1.  Large right pleural effusion with right lung collapse.      XR VIDEO SWALLOW WITH SLP OR OT  LOCATION: [facility]  DATE: 8/14/2024    INDICATION: Difficulty swallowing.  COMPARISON: None.  FINDINGS:   Swallow study with Speech Pathology using multiple barium thicknesses.    Penetration with thin liquids, which slightly improved with chin tuck maneuver and decreased swallow volumes. No definite aspiration visualized with trialed consistencies.  IMPRESSION:  Penetration with thin liquids, however no aspiration visualized. Please see speech pathology report for further details and recommendations.    XR CHEST 1 VIEW  LOCATION: [facility]  DATE: 8/15/2024  INDICATION: Treated for empyema, s p chest tube , follow up pleural effusion  COMPARISON: CT chest 8/13/2024, chest radiograph 8/11/2024  IMPRESSION: Unchanged position of the pigtail chest tube in the right lung base with similar complete opacification of the right hemithorax. Trace left effusion with dependent atelectasis. Background emphysema. Cardiac silhouette and mediastinal contours are mostly obscured. Aortic valve replacement. Left chest cardiac generator and leads are unchanged. Right upper extremity PICC tip terminates in the mid SVC.    By:  [place] Joseph MD, 08/15/2024  9:16 AM    Primary Care Physician:  Cammy Bui

## 2024-08-15 NOTE — PROGRESS NOTES
HEART CARE CONSULTATON NOTE        Assessment/Recommendations   Assessment:   1.  Acute on chronic hypoxic respiratory failure 2/2 large right-sided pleural effusion with pneumothorax.  E. coli pneumonia (recent).  Chest tube 8/10.    2.  Circulatory shock required pressors on admission.  Resolved.     3.  Acute on chronic heart failure with preserved ejection fraction, stable.    4.  Severe aortic stenosis: Status post TAVR February 2024. (Stable gradients)  5.  Paroxysmal atrial fibrillation/flutter with RVR, improved.   5.  Sinus node dysfunction status post pacemaker placement    Plan:  1. Continue metoprolol and diltiazem for atrial flutter.   2. Digoxin 125 mcg PO daily, would continue at this time.  Could be stopped in the future if heart rates are stable.  3. Qtc: 560 ms.   AVOID any further Qtc prolongation medications.  Patient is off amiodarone      4. Continue Eliquis    Patient heart rates are controlled.  She has been inconsistent 3-day 1 and 41 AV block with 3 flutter.  Currently asymptomatic from arrhythmia standpoint.    Patient has cardiology EP appointment on 9/9/2024.  Cardiology signed off at this time       History of Present Illness/Subjective    S: Cardiovascular standpoint no acute symptoms.  Reviewed telemetry demonstrates ongoing atrial flutter with 4-1 conduction and stable heart rates in 70s.  Tolerating medications well.  Digoxin level 0.6.  Renal function stable.   Creatinine   Date Value Ref Range Status   08/13/2024 0.68 0.51 - 0.95 mg/dL Final         ECHO: 4/17/2024  1. Normal left ventricular size and systolic performance with a visually  estimated ejection fraction of 55-60%.  2. There is a bio-prosthetic aortic valve (documented 26 mm Douglas Mai 3  Resilia tissue valve).  Â  Normal aortic valve prosthesis metrics with a mean systolic gradient of 8  mmHg and a peak anterograde velocity of 1.9 m/sec.  Â  No aortic insufficiency is detected.  3. Normal right ventricular size  "and systolic performance.  4. There is mild left atrial enlargement.     Â When compared to the prior real-time echocardiogram dated 21 February 2024,  the pacing electrodes appear to be new. The findings are otherwise felt to be  fairly similar on both examinations.     Physical Examination     VITALS: /63 (BP Location: Left arm)   Pulse 77   Temp 98.2  F (36.8  C) (Oral)   Resp 18   Ht 1.651 m (5' 5\")   Wt 71.2 kg (156 lb 14.4 oz)   LMP  (LMP Unknown)   SpO2 91%   BMI 26.11 kg/m    BMI: Body mass index is 26.11 kg/m .  Wt Readings from Last 3 Encounters:   08/15/24 71.2 kg (156 lb 14.4 oz)   07/15/24 65.3 kg (143 lb 14.4 oz)   05/22/24 66.2 kg (146 lb)       Intake/Output Summary (Last 24 hours) at 8/12/2024 1038  Last data filed at 8/12/2024 0800  Gross per 24 hour   Intake 3605.56 ml   Output 1490 ml   Net 2115.56 ml     General Appearance:   no distress, upright in bed.  fatigued   ENT/Mouth: membranes moist, no oral lesions or bleeding gums.      EYES:  no scleral icterus, normal conjunctivae   Neck: no carotid bruits or thyromegaly   Chest/Lungs:   Course lung sounds. + chest tube.    Cardiovascular:   Regular, rapid.  No edema.                               Lab Results    Chemistry/lipid CBC Cardiac Enzymes/BNP/TSH/INR   Recent Labs   Lab Test 09/20/23  1136   CHOL 167   HDL 75   LDL 75   TRIG 83     Recent Labs   Lab Test 09/20/23  1136 07/25/22  1026 12/24/21  0756   LDL 75 53 77     Recent Labs   Lab Test 08/12/24  0748 08/12/24  0502   NA  --  137   POTASSIUM  --  3.9   CHLORIDE  --  97*   CO2  --  29   * 351*   BUN  --  26.4*   CR  --  0.85   GFRESTIMATED  --  71   JOEY  --  8.3*     Recent Labs   Lab Test 08/12/24  0502 08/11/24  0400 08/10/24  0937   CR 0.85 0.75 0.93     Recent Labs   Lab Test 02/06/24  1953 09/20/23  1136 05/25/23  0507   A1C 6.2* 6.1* 6.1*          Recent Labs   Lab Test 08/12/24  0502   WBC 12.0*   HGB 9.0*   HCT 31.0*   MCV 85        Recent Labs   Lab " Test 08/12/24  0502 08/11/24  0400 08/10/24  0937   HGB 9.0* 9.3* 11.4*    Recent Labs   Lab Test 05/24/23  1322 05/24/23  1015 02/08/23  0759   TROPONINI 0.07 0.08 0.09     Recent Labs   Lab Test 08/12/24  0502 08/10/24  0937 07/13/24  0316 07/12/24 2030 02/19/24  1340 02/06/24  1953 06/02/23  0057 05/24/23  1015 02/08/23  0759   BNP  --   --   --   --   --   --  136* 176* 867*   NTBNPI 6,274* 8,987* 698   < >  --    < >  --   --   --    NTBNP  --   --   --   --  641  --   --   --   --     < > = values in this interval not displayed.     Recent Labs   Lab Test 08/10/24  0937   TSH 2.80     Recent Labs   Lab Test 07/13/24  0703 07/12/24 2030 02/19/24  1340   INR 1.27* 1.45* 1.04        Medical History  Surgical History Family History Social History   Past Medical History:   Diagnosis Date    Anemia     Aortic stenosis     Aortic valve disorder     Atrial fibrillation (H)     Atrial flutter (H)     Benign neoplasm of adenomatous polyp     large intestine     Chronic constipation     Chronic heart failure with preserved ejection fraction (H) 02/29/2024    Chronic pain syndrome     Congestive heart failure (H)     COPD (chronic obstructive pulmonary disease) (H)     Oxygen at night     Dependence on supplemental oxygen     Oxygen at noc, during the day as needed    Depression     Diabetes mellitus (H)     Dry eye syndrome     Fibromyalgia     Ganglion     left wrist    GERD (gastroesophageal reflux disease)     Hyperlipidemia     Hypertension     Hypokalemia     Infective otitis externa, unspecified     Created by Conversion     Larynx edema     Lung disease     Malignant neoplasm of vulva (H)     Created by Conversion [facility] Annotation: Apr 17 2007  8:24AM - Cammy Bui:  resection per Dr. Alfonso Mane 9/06;  Replacement Utility updated for latest IMO load    Medial epicondylitis     Onychomycosis     Osteoarthritis     Peptic ulcer     Polyneuropathy     Vulvar malignant neoplasm (H)      Past  Surgical History:   Procedure Laterality Date    BIOPSY BREAST Right     BIOPSY BREAST Right 01/28/2015    BIOPSY BREAST Right 01/28/2015    Procedure: RIGHT BREAST BIOPSY AFTER WIRE LOCALIZATION AT 0940;  Surgeon: Renée Soriano MD;  Location: [facility] [place];  Service:     BIOPSY OF BREAST, INCISIONAL      Description: Incisional Breast Biopsy;  Recorded: 11/13/2007;  Comments: benign    COLONOSCOPY N/A 06/14/2019    Procedure: COLONOSCOPY;  Surgeon: Eduardo Mora MD;  Location: [facility] [place];  Service: Gastroenterology    CV CORONARY ANGIOGRAM N/A 02/08/2024    Procedure: CV CORONARY ANGIOGRAM;  Surgeon: Moises Valencia MD;  Location: Health system LAB CV    CV LEFT HEART CATH N/A 02/08/2024    Procedure: Left Heart Catheterization;  Surgeon: Moises Valencia MD;  Location: Health system LAB CV    CV RIGHT HEART CATH MEASUREMENTS RECORDED N/A 02/08/2024    Procedure: Right Heart Catheterization;  Surgeon: Moises Valencia MD;  Location: [facility] CV    CV TRANSCATHETER AORTIC VALVE REPLACEMENT-FEMORAL APPROACH N/A 02/20/2024    Procedure: Transcatheter Aortic Valve Replacement, possible cardiopulmonary bypass, possible surgical intervention;  Surgeon: Moises Valencia MD;  Location: [facility] CV    EP PACEMAKER DEVICE & IMPLANT- HIS LEAD DUAL N/A 3/7/2024    Procedure: Pacemaker Device & Lead Implant - HIS Lead Dual;  Surgeon: Donnell Leon MD;  Location: [facility] CV    ESOPHAGOSCOPY, GASTROSCOPY, DUODENOSCOPY (EGD), COMBINED N/A 11/06/2018    Procedure: ESOPHAGOGASTRODUODENOSCOPY;  Surgeon: Lit Fernando MD;  Location: [facility] [place];  Service:     HYSTERECTOMY      JOINT REPLACEMENT Left     TKA    OR TRANSCATHETER AORTIC VALVE REPLACEMENT, FEMORAL PERCUTANEOUS APPROACH (STANDBY) N/A 02/20/2024    Procedure: OR TRANSCATHETER AORTIC VALVE REPLACEMENT, FEMORAL PERCUTANEOUS APPROACH (STANDBY);  Surgeon: Ishmael Farias MD;  Location: [facility]  CATH LAB CV    PICC TRIPLE LUMEN PLACEMENT  01/12/2023         PICC TRIPLE LUMEN PLACEMENT  8/10/2024    CA ABLATE HEART DYSRHYTHM FOCUS      Description: Catheter Ablation Atrial Fibrillation;  Recorded: 07/31/2012;  Comments: 7/24/12 PVI with Dr. Gardiner and nilay to all 5 pulm veins and CTI fl ablation line as well.    TRANSCATHETER AORTIC-VALVE REPLACEMENT      ZZC SUPRACERV ABD HYSTERECTOMY      Description: Supracervical Hysterectomy;  Proc Date: 01/01/1985;  Comments: some cervix left!; ovaries intact; done for bleeding     Family History   Problem Relation Age of Onset    Heart Failure Mother     Cancer Other         paternal HX-laryngeal     Alcoholism Sister     No Known Problems Daughter     No Known Problems Maternal Grandmother     No Known Problems Maternal Grandfather     No Known Problems Paternal Grandmother     No Known Problems Paternal Grandfather     No Known Problems Maternal Aunt     No Known Problems Paternal Aunt     Alcoholism Sister     Alcoholism Brother     Alcoholism Father     Cancer Paternal Uncle         Gastric-Alcohol    Cancer Paternal Uncle         gastric-Alcohol    Hereditary Breast and Ovarian Cancer Syndrome No family hx of     Breast Cancer No family hx of     Colon Cancer No family hx of     Endometrial Cancer No family hx of     Ovarian Cancer No family hx of         Social History     Socioeconomic History    Marital status:      Spouse name: Not on file    Number of children: Not on file    Years of education: Not on file    Highest education level: Not on file   Occupational History    Not on file   Tobacco Use    Smoking status: Some Days     Current packs/day: 0.25     Types: Cigarettes     Passive exposure: Never    Smokeless tobacco: Never    Tobacco comments:     seen by [facility] inpatient on 3/31/22   Vaping Use    Vaping status: Never Used   Substance and Sexual Activity    Alcohol use: Yes     Comment: Alcoholic Drinks/day: very little    Drug use: No    Sexual  "activity: Not on file   Other Topics Concern    Not on file   Social History Narrative    Not on file     Social Determinants of Health     Financial Resource Strain: Low Risk  (12/26/2023)    Financial Resource Strain     Within the past 12 months, have you or your family members you live with been unable to get utilities (heat, electricity) when it was really needed?: No   Food Insecurity: Low Risk  (12/26/2023)    Food Insecurity     Within the past 12 months, did you worry that your food would run out before you got money to buy more?: No     Within the past 12 months, did the food you bought just not last and you didn t have money to get more?: No   Transportation Needs: Low Risk  (12/26/2023)    Transportation Needs     Within the past 12 months, has lack of transportation kept you from medical appointments, getting your medicines, non-medical meetings or appointments, work, or from getting things that you need?: No   Physical Activity: Not on file   Stress: Not on file   Social Connections: Not on file   Interpersonal Safety: Low Risk  (4/1/2024)    Interpersonal Safety     Do you feel physically and emotionally safe where you currently live?: Yes     Within the past 12 months, have you been hit, slapped, kicked or otherwise physically hurt by someone?: No     Within the past 12 months, have you been humiliated or emotionally abused in other ways by your partner or ex-partner?: No   Housing Stability: Low Risk  (12/26/2023)    Housing Stability     Do you have housing? : Yes     Are you worried about losing your housing?: No         Medications  Allergies   No current outpatient medications on file.        Allergies   Allergen Reactions    Celebrex [Celecoxib] Rash     patient had butterfly rash - \"lupus-like\"      Latex Rash         Bautista Jiménez, DO    "

## 2024-08-15 NOTE — CONSULTS
General Surgery Consultation  Mary Kay Tejada MRN# [id number]   Age/Sex: 74 year old female YOB: 1950     Reason for consult: 1. Pleural effusion    2. Tension hydrothorax    3. Atrial fibrillation with rapid ventricular response (H)    4. Lactic acidosis    5. Acute and chronic respiratory failure with hypercapnia (H)            Requesting physician: Dr. Nori Joseph                  Assessment and Plan:   Assessment:  Mary Kay Tejada is a 74 year old complicated PMH including pacemaker, Aortic stenosis s/p TAVR, HFpEF, COPD,  AFib on eliquis, 7/12-7/17 admit ARF pneumonia + COVID w/PTX s/p thoracentesis improvement without chest tube now presenting 8/10 w/SOB and Lg right pleural effusion w/ Chest tube in ED, repositioned 14 Fr 8/11 + Dx bronchoscopy w/ Cx candida albicans, E.coli, yeast no lesions also postop A.flutter now on digoxin + diltiazem. On imaging, pleural effusion resolved w/ chest tube 8/11, recurred 8/13 and now large 8/15 despite decreased output in chest tube, will plan for VATS procedure tomorrow morning.    Plan:  -Okay for diet today, NPO at midnight for procedure  -Continue chest tube -20 cm H2O today continuous wall suction  -Continue holding eliquis  -Plan for VATS procedure tomorrow morning  -Medical management per primary team          Chief Complaint:     Chief Complaint   Patient presents with    Shortness of Breath        History is obtained from the patient, electronic health record, and patient's significant other    HPI:   Mary Kay Tejada is a 74 year old female who presents with shortness of breath, patient states her breathing was a bit better even though the chest tube hurts, in the last two days has been relatively stable not worse. Patient denies current SOB like admission. Denies fever, chills though has had sweating episodes such as once this afternoon. Denies nausea, vomiting, and is quite thirsty. Low appetite is not hungry currently. Is eager to have  ice water however.    Patient is concerned over her heart and whether this will be affected with the lung procedure or any surgery. Discussion regarding what surgery entails and patient is amenable, realizing likely will still have a chest tube after surgery.          Past Medical History:     Past Medical History:   Diagnosis Date    Anemia     Aortic stenosis     Aortic valve disorder     Atrial fibrillation (H)     Atrial flutter (H)     Benign neoplasm of adenomatous polyp     large intestine     Chronic constipation     Chronic heart failure with preserved ejection fraction (H) 02/29/2024    Chronic pain syndrome     Congestive heart failure (H)     COPD (chronic obstructive pulmonary disease) (H)     Oxygen at night     Dependence on supplemental oxygen     Oxygen at noc, during the day as needed    Depression     Diabetes mellitus (H)     Dry eye syndrome     Fibromyalgia     Ganglion     left wrist    GERD (gastroesophageal reflux disease)     Hyperlipidemia     Hypertension     Hypokalemia     Infective otitis externa, unspecified     Created by Conversion     Larynx edema     Lung disease     Malignant neoplasm of vulva (H)     Created by Conversion [facility] Annotation: Apr 17 2007  8:24AM - Cammy Bui:  resection per Dr. Alfonso Mane 9/06;  Replacement Utility updated for latest IMO load    Medial epicondylitis     Onychomycosis     Osteoarthritis     Peptic ulcer     Polyneuropathy     Vulvar malignant neoplasm (H)               Past Surgical History:     Past Surgical History:   Procedure Laterality Date    BIOPSY BREAST Right     BIOPSY BREAST Right 01/28/2015    BIOPSY BREAST Right 01/28/2015    Procedure: RIGHT BREAST BIOPSY AFTER WIRE LOCALIZATION AT 0940;  Surgeon: Renée Soriano MD;  [place] [facility] [place]  Service:     BIOPSY OF BREAST, INCISIONAL      Description: Incisional Breast Biopsy;  Recorded: 11/13/2007;  Comments: benign    COLONOSCOPY N/A 06/14/2019     Procedure: COLONOSCOPY;  Surgeon: Eduardo Mora MD;  Location: [facility] OR;  Service: Gastroenterology    CV CORONARY ANGIOGRAM N/A 02/08/2024    Procedure: CV CORONARY ANGIOGRAM;  Surgeon: Moises Valencia MD;  Location: [facility] CV    CV LEFT HEART CATH N/A 02/08/2024    Procedure: Left Heart Catheterization;  Surgeon: Moises Valencia MD;  Location: [facility]    CV RIGHT HEART CATH MEASUREMENTS RECORDED N/A 02/08/2024    Procedure: Right Heart Catheterization;  Surgeon: Moises Valencia MD;  Location: [facility]    CV TRANSCATHETER AORTIC VALVE REPLACEMENT-FEMORAL APPROACH N/A 02/20/2024    Procedure: Transcatheter Aortic Valve Replacement, possible cardiopulmonary bypass, possible surgical intervention;  Surgeon: Moises Valencia MD;  Location: [facility] CV    EP PACEMAKER DEVICE & IMPLANT- HIS LEAD DUAL N/A 3/7/2024    Procedure: Pacemaker Device & Lead Implant - HIS Lead Dual;  Surgeon: Donnell Leon MD;  Location: [facility] CV    ESOPHAGOSCOPY, GASTROSCOPY, DUODENOSCOPY (EGD), COMBINED N/A 11/06/2018    Procedure: ESOPHAGOGASTRODUODENOSCOPY;  Surgeon: Lit Fernando MD;  Location: [facility] OR;  Service:     HYSTERECTOMY      JOINT REPLACEMENT Left     TKA    OR TRANSCATHETER AORTIC VALVE REPLACEMENT, FEMORAL PERCUTANEOUS APPROACH (STANDBY) N/A 02/20/2024    Procedure: OR TRANSCATHETER AORTIC VALVE REPLACEMENT, FEMORAL PERCUTANEOUS APPROACH (STANDBY);  Surgeon: Ishmael Farias MD;  Location: [facility] CV    PICC TRIPLE LUMEN PLACEMENT  01/12/2023         PICC TRIPLE LUMEN PLACEMENT  8/10/2024    AZ ABLATE HEART DYSRHYTHM FOCUS      Description: Catheter Ablation Atrial Fibrillation;  Recorded: 07/31/2012;  Comments: 7/24/12 PVI with Dr. Bansal to all 5 pulm veins and CTI fl ablation line as well.    TRANSCATHETER AORTIC-VALVE REPLACEMENT      ZZC SUPRACERV ABD HYSTERECTOMY      Description:  "Supracervical Hysterectomy;  Proc Date: 01/01/1985;  Comments: some cervix left!; ovaries intact; done for bleeding             Social History:    reports that she has been smoking cigarettes. She has never been exposed to tobacco smoke. She has never used smokeless tobacco. She reports current alcohol use. She reports that she does not use drugs.           Family History:     Family History   Problem Relation Age of Onset    Heart Failure Mother     Cancer Other         paternal HX-laryngeal     Alcoholism Sister     No Known Problems Daughter     No Known Problems Maternal Grandmother     No Known Problems Maternal Grandfather     No Known Problems Paternal Grandmother     No Known Problems Paternal Grandfather     No Known Problems Maternal Aunt     No Known Problems Paternal Aunt     Alcoholism Sister     Alcoholism Brother     Alcoholism Father     Cancer Paternal Uncle         Gastric-Alcohol    Cancer Paternal Uncle         gastric-Alcohol    Hereditary Breast and Ovarian Cancer Syndrome No family hx of     Breast Cancer No family hx of     Colon Cancer No family hx of     Endometrial Cancer No family hx of     Ovarian Cancer No family hx of               Allergies:     Allergies   Allergen Reactions    Celebrex [Celecoxib] Rash     patient had butterfly rash - \"lupus-like\"      Latex Rash              Medications:     Prior to Admission medications    Medication Sig Start Date End Date Taking? Authorizing Provider   albuterol (PROAIR HFA/PROVENTIL HFA/VENTOLIN HFA) 108 (90 Base) MCG/ACT inhaler INHALE 2 PUFFS INTO THE LUNGS EVERY 4 HOURS AS NEEDED FOR WHEEZING 6/4/24  Yes Cammy Bui MD   apixaban ANTICOAGULANT (ELIQUIS) 5 MG tablet Take 1 tablet (5 mg) by mouth 2 times daily 2/23/24  Yes Margy Rea PA-C   atorvastatin (LIPITOR) 20 MG tablet TAKE 1 TABLET(20 MG) BY MOUTH AT BEDTIME 6/26/24  Yes Cammy Bui MD   budesonide-formoterol (SYMBICORT) 160-4.5 MCG/ACT " Inhaler INHALE 2 PUFFS INTO THE LUNGS TWICE DAILY 4/26/24  Yes Cammy Bui MD   diltiazem ER COATED BEADS (CARDIZEM CD/CARTIA XT) 120 MG 24 hr capsule TAKE 1 CAPSULE(120 MG) BY MOUTH DAILY 7/12/24  Yes Cammy Bui MD   Emollient (EUCERIN ORIGINAL HEALING) LOTN APPLY LIBERALLY TO DRY SKIN TWICE DAILY AS NEEDED 3/5/24  Yes Reported, Patient   empagliflozin (JARDIANCE) 10 MG TABS tablet Take 1 tablet (10 mg) by mouth daily 5/28/24  Yes Cammy Bui MD   furosemide (LASIX) 20 MG tablet Take 2 tablets (40 mg) by mouth daily 4/29/24  Yes Alexandria Lynn, RAUDEL CNP   gabapentin (NEURONTIN) 600 MG tablet TAKE 1 TABLET(600 MG) BY MOUTH THREE TIMES DAILY 12/17/23  Yes Cammy Bui MD   ipratropium - albuterol 0.5 mg/2.5 mg/3 mL (DUONEB) 0.5-2.5 (3) MG/3ML neb solution INHALE 1 VIAL VIA NEBULIZER EVERY 6 HOURS AS NEEDED FOR SHORTNESS OF BREATH OR WHEEZING 7/12/24  Yes Cammy Bui MD   metoprolol tartrate (LOPRESSOR) 50 MG tablet Take 1 tablet (50 mg) by mouth 2 times daily 7/23/24  Yes Surendra Pizano MD   nystatin (NYSTOP) 846483 UNIT/GM external powder APPLY TO THE AFFECTED AREA(S) 2-3 TIMES DAILY AS NEEDED 3/4/24  Yes Cammy Bui MD   omeprazole (PRILOSEC) 40 MG DR capsule Take 40 mg by mouth daily   Yes Unknown, Entered By History   oxyCODONE IR (ROXICODONE) 10 MG tablet Take 1 tablet (10 mg) by mouth every 6 hours as needed for moderate to severe pain 7/29/24  Yes Cammy Bui MD   polyethylene glycol (MIRALAX) 17 GM/Dose powder Take 17 g by mouth daily as needed for constipation 7/17/24  Yes Johnny Arellano MD   potassium chloride john ER (KLOR-CON M20) 20 MEQ CR tablet TAKE 1 TABLET(20 MEQ) BY MOUTH DAILY 7/26/24  Yes Cammy Bui MD   sennosides (SENOKOT) 8.6 MG tablet Take 1 tablet by mouth 2 times daily 7/17/24  Yes Johnny Arellano MD   sucralfate  "(CARAFATE) 1 GM tablet CRUSH ONE TABLET AND MIX WITH A LLITTLE WATER AND SWALLOW FOUR TIMES DAILY 1/19/24  Yes Cammy Bui MD   ACCU-CHEK SOFTCLIX LANCETS lancets [ACCU-CHEK SOFTCLIX LANCETS LANCETS] TEST THREE TIMES DAILY 8/28/18   Cammy Bui MD   BD ULTRA-FINE SHORT PEN NEEDLE 31 gauge x 5/16\" Ndle [BD ULTRA-FINE SHORT PEN NEEDLE 31 GAUGE X 5/16\" NDLE] TEST FOUR TIMES DAILY WITH MEALS AND AT BEDTIME 7/24/19   Cammy Bui MD   blood glucose (ONETOUCH VERIO IQ) test strip TEST THREE TIMES DAILY 4/15/24   Cammy Bui MD   blood-glucose meter (ONETOUCH VERIO IQ METER) Misc [BLOOD-GLUCOSE METER (ONETOUCH VERIO IQ METER) MISC] Check blood sugar three times a day. 10/30/19   Cammy Bui MD   diaper,brief,adult,disposable (ADULT BRIEFS - LARGE) Misc [DIAPER,BRIEF,ADULT,DISPOSABLE (ADULT BRIEFS - LARGE) MISC] Use 3-4 daily as needed for incontinence 8/16/19   Cammy Bui MD   generic lancets (FINGERSTIX LANCETS) [GENERIC LANCETS (FINGERSTIX LANCETS)] Dispense brand per patient's insurance at pharmacy discretion. 3/5/19   Cammy Bui MD   naloxone (NARCAN) 4 MG/0.1ML nasal spray Spray 1 spray (4 mg) into one nostril alternating nostrils once as needed for opioid reversal every 2-3 minutes until assistance arrives 2/21/22   Cammy Bui MD   Nutritional Supplements (ENSURE COMPLETE) LIQD Take 1 Can by mouth daily 4/1/24   Cammy Bui MD              Review of Systems:   The Review of Systems is negative other than noted in the HPI            Physical Exam:   Patient Vitals for the past 24 hrs:   BP Temp Temp src Pulse Resp SpO2 Weight   08/15/24 0900 -- -- -- -- -- 91 % --   08/15/24 0730 116/63 98.2  F (36.8  C) Oral -- 18 -- --   08/15/24 0444 112/62 97.7  F (36.5  C) Oral 77 20 93 % --   08/15/24 0308 -- -- -- -- -- 93 % --   08/15/24 0040 103/57 -- -- " 76 -- 93 % --   08/15/24 0025 96/53 96.9  F (36.1  C) Axillary 77 20 95 % 71.2 kg (156 lb 14.4 oz)   08/14/24 2100 -- 97.4  F (36.3  C) Oral -- 20 -- --   08/14/24 1543 116/61 98.4  F (36.9  C) Oral -- 18 -- --   08/14/24 1316 -- -- -- 105 -- (!) 87 % --   08/14/24 1223 114/55 97.7  F (36.5  C) Oral 107 18 -- --          Intake/Output Summary (Last 24 hours) at 8/15/2024 1128  Last data filed at 8/15/2024 1000  Gross per 24 hour   Intake 615 ml   Output 1374 ml   Net -759 ml      Constitutional:   Awake, alert, cooperative, no apparent distress, and appears stated age; talkative with raspy voice which is baseline   and son also in room       Eyes:   PERRL, conjunctiva/corneas clear, EOM's intact; no scleral edema or icterus noted        ENT:   Normocephalic, without obvious abnormality, atraumatic, Lips, mucosa, and tongue normal though dry and peeling      Lungs:   Normal respiratory effort, no accessory muscle use; barrel chested and chest tube on the right side with dressing clean, dry, intact and draining serosanguinous fluid output. No foul smell noted. Placed to water seal and no tidaling or bubbling visualized on exam. Placed back to suction -20 cm water. No bubbling. Patient on 4 L oxygen currently nasal cannula.       Cardiovascular:   Regular rate and rhythm       Abdomen:   Generally soft nontender with some auscultated bowel sounds       Musculoskeletal:   No obvious swelling, bruising or deformity on exposed skin       Skin:   Skin color and texture normal for patient, no rashes or lesions              Data:         All imaging studies reviewed by me.    Results for orders placed or performed during the hospital encounter of 08/10/24 (from the past 24 hour(s))   Glucose by meter   Result Value Ref Range    GLUCOSE BY METER POCT 159 (H) 70 - 99 mg/dL   Glucose by meter   Result Value Ref Range    GLUCOSE BY METER POCT 203 (H) 70 - 99 mg/dL   Glucose by meter   Result Value Ref Range    GLUCOSE BY  METER POCT 294 (H) 70 - 99 mg/dL   Potassium   Result Value Ref Range    Potassium 4.1 3.4 - 5.3 mmol/L   Magnesium   Result Value Ref Range    Magnesium 2.3 1.7 - 2.3 mg/dL   Digoxin level   Result Value Ref Range    Digoxin 0.6 0.6 - 2.0 ng/mL   Glucose by meter   Result Value Ref Range    GLUCOSE BY METER POCT 109 (H) 70 - 99 mg/dL   XR Chest 1 View    Narrative    EXAM: XR CHEST 1 VIEW  LOCATION: [facility]  DATE: 8/15/2024    INDICATION: Treated for empyema, s p chest tube , follow up pleural effusion  COMPARISON: CT chest 8/13/2024, chest radiograph 8/11/2024      Impression    IMPRESSION: Unchanged position of the pigtail chest tube in the right lung base with similar complete opacification of the right hemithorax. Trace left effusion with dependent atelectasis. Background emphysema. Cardiac silhouette and mediastinal contours   are mostly obscured. Aortic valve replacement. Left chest cardiac generator and leads are unchanged. Right upper extremity PICC tip terminates in the mid SVC.     *Note: Due to a large number of results and/or encounters for the requested time period, some results have not been displayed. A complete set of results can be found in Results Review.           [facility]  [facility] General Surgery  [address]  [place] [address]

## 2024-08-15 NOTE — PLAN OF CARE
Pt Aox4. C/o minimal CT pain. No PRNs utilized. Minimal CT output. Not oob this shift. Weight shifting frequently to promote skin integrity. Good UOP with purewick. VSS on 6L NC. Aflutter rate controlled on tele. Plan is for NPO at midnight. Pt able to make needs known. Call light within reach and purposeful rounding performed. Stefany Snell RN      Problem: Adult Inpatient Plan of Care  Goal: Optimal Comfort and Wellbeing  Outcome: Progressing     Problem: Fall Injury Risk  Goal: Absence of Fall and Fall-Related Injury  Outcome: Progressing

## 2024-08-15 NOTE — PROGRESS NOTES
"Speech-Language Pathology: Video Swallow Study      08/14/24 1043   Appointment Info   Signing Clinician's Name / Credentials (SLP) Cris Olmstead MA, CCC-SLP   General Information   Onset of Illness/Injury or Date of Surgery 08/10/24   Referring Physician Carmelita Buckley MD   Pertinent History of Current Problem Per provider documentation, \"Mary Kay Tejada is a 74 year old female admitted on 8/10/2024. She has a history of HTN, CAD, HFpEF, Afib on eliquis, aortic stenosis s/p TAVR, s/p pacemaker, COPD, tobacco user and is admitted for for SOB and found to have large right pleural effusion with empyema, pneumonia and hypotension.\" A video swallow study was completed to evaluate for silent aspiration.   General Observations Patient alert and cooperative.   Type of Evaluation   Type of Evaluation Swallow Evaluation   General Swallowing Observations   Past History of Dysphagia Patient assessed at bedside by SLP in June 2023. Recommended regular/thin diet and discharged from SLP services.   Comment, General Swallowing Observations A clinical swallow evaluation was completed on 8/12/24. In part, evaluating SLP's clinical impressions were as follows: \"Given patient's history of COPD, new COPD exacerbation, previous concern of aspiration during prior hospitalizations, and new treatment for PNA, recommend VFSS to rule out potential silent aspiration as patient is at an elevated risk. Recommend regular diet and thin liquids with patient self-selecting softer solids. Patient should be fully upright and alert for intake, take small bites/sips, use a slow rate of intake.\"   Respiratory Support nasal cannula  (3 LPM)   Current Diet/Method of Nutritional Intake (General Swallowing Observations, NIS) regular diet;thin liquids (level 0)   Swallowing Evaluation Videofluoroscopic swallow study (VFSS)   VFSS Evaluation   Radiologist Obdulio Brown MD   Views Taken left lateral   Physical Location of Procedure [facility] - " [facility]   VFSS Textures Trialed thin liquids;pureed   VFSS Eval: Thin Liquid Texture Trial   Mode of Presentation, Thin Liquid cup;self-fed   Order of Presentation Trials 1, 2, 3, 4, 7, 8  (First two trials did not save/record)   Preparatory Phase WFL   Oral Phase, Thin Liquid WFL   Bolus Location When Swallow Triggered pyriforms   Pharyngeal Phase, Thin Liquid impaired hyolaryngeal excursion;other (see comments)  (Delayed epiglottic inversion)   Rosenbek's Penetration Aspiration Scale: Thin Liquid Trial Results 5 - contrast contacts vocal cords, visible residue remains (penetration)   Response to Aspiration absent response   Strategies and Compensations chin tuck;reduce bolus size   Diagnostic Statement Deep laryngeal penetration occurred with first two trials of thin liquid. Notably, patient took large sips. Penetrated material did not fully clear from the laryngeal vestibule. The strategies of reduced bolus size and chin tuck posture did not eliminate laryngeal penetration, but did appear to reduce depth of penetration. Additionally, laryngeal penetration was largely transient with use of these strategies. Aspiration did not occur during this evaluation.   VFSS Evaluation: Puree Solid Texture Trial   Mode of Presentation, Puree spoon;fed by clinician   Order of Presentation Trials 5, 6   Preparatory Phase WFL   Oral Phase, Puree WFL   Bolus Location When Swallow Triggered valleculae   Pharyngeal Phase, Puree impaired hyolaryngel excursion   Rosenbek's Penetration Aspiration Scale: Puree Food Trial Results 1 - no aspiration, contrast does not enter airway   Diagnostic Statement Oropharyngeal swallow function was WNL with trials of puree consistency. No aspiration or laryngeal penetration observed. No oropharyngeal residue noted after swallows.   Esophageal Phase of Swallow   Patient reports or presents with symptoms of esophageal dysphagia No   Esophageal comments Pt has history of GERD   Swallowing  Recommendations   Diet Consistency Recommendations regular diet;thin liquids (level 0)   Supervision Level for Intake patient independent   Mode of Delivery Recommendations bolus size, small;slow rate of intake   Postural Recommendations chin tuck  (with liquids only)   Monitoring/Assistance Required (Eating/Swallowing) stop eating activities when fatigue is present   Recommended Feeding/Eating Techniques (Swallow Eval) maintain upright posture during/after eating for 30 minutes;minimize distractions during oral intake   Medication Administration Recommendations, Swallowing (SLP) Give pills mixed in applesauce. Cut in half as needed.   General Therapy Interventions   Planned Therapy Interventions Dysphagia Treatment   Dysphagia treatment Instruction of safe swallow strategies;Compensatory strategies for swallowing   Clinical Impression   Criteria for Skilled Therapeutic Interventions Met (SLP Eval) Yes, treatment indicated   SLP Diagnosis Mild pharyngeal dysphagia   Risks & Benefits of therapy have been explained evaluation/treatment results reviewed;care plan/treatment goals reviewed;risks/benefits reviewed;participants voiced agreement with care plan;participants included;patient   Clinical Impression Comments Video swallow study completed. Patient presents with mild pharyngeal dysphagia characterized by consistent laryngeal penetration with thin liquid. This was deep (at/near the level of the vocal folds) and did not spontaneously clear from the laryngeal vestibule. With use of the strategies of reduced bolus size and chin tuck posture, laryngeal penetration appeared shallower and more transient in nature with minimal to no contrast material remaining in the laryngeal vestibule. Aspiration did not occur during this evaluation.     Oral phase was characterized by good bolus control with both consistencies. Bolus prep and A-P bolus transit were effective and timely. Tongue base retraction was WNL. Pharyngeal phase  was characterized by a delayed swallow response with thin liquid, with swallow triggering at the pyriform sinuses. Epiglottic inversion was complete but mildly delayed. Combined, these factors resulted in consistent laryngeal penetration with thin liquid. Hyolaryngeal excursion and hyolaryngeal elevation were both reduced. Pharyngeal constriction appeared WFL. Barium contrast readily passed through the PES and cervical esophagus.     While no aspiration was observed during this evaluation, patient is at increased risk with thin liquids due primarily to delayed timing of the swallow response and delayed epiglottic inversion. Risk appears reduced, though not eliminated, with use of the strategies of smaller bolus size and chin tuck posture. At this time, patient appears appropriate to continue current diet of Regular textures and thin liquids with strict adherence to the safe/compensatory swallowing strategies indicated above. Speech Therapy will continue to follow to monitor diet tolerance and provide patient and caregivers with further training on strategies.   SLP Total Evaluation Time   Evaluation, videofluoroscopic eval of swallow function Minutes (97586) 10   Swallowing Dysfunction &/or Oral Function for Feeding   Treatment of Swallowing Dysfunction &/or Oral Function for Feeding Minutes (96075) 10   Treatment Detail/Skilled Intervention Under fluoroscopy, patient was instructed in use of the strategies of reduced bolus size and chin tuck posture. Patient was able to accurately and consistently demonstrate these strategies. Result: Strategies appeared effective at reducing the depth of laryngeal penetration with trials of thin liquid. Additionally, penetration appeared more transient than with larger sips taken when patient's head was in the natural position for swallowing, with minimal to no residual contrast material noted in the laryngeal vestibule.   SLP Discharge Planning   SLP Plan F/U re: diet tolerance  and adherence to strategies. Anticipate 1-2 more sessions. If no concerns, s/o.   SLP Discharge Recommendation home with assist   SLP Rationale for DC Rec Patient's swallow function is mildly impaired but likely at/near baseline. Anticipate that Speech Therapy goals will be met prior to discharge.   SLP Brief overview of current status  Recommend continue current diet of regular food textures and thin liquids with patient self-selecting softer solids. Patient should be fully upright and alert for intake, take small bites/sips, use a slow rate of intake. She should also take small sips of liquid and complete a chin tuck prior to swallowing. Pills to be given mixed in puree. SLP will continue to follow for dysphagia management.   Total Session Time   Total Session Time (sum of timed and untimed services) 20

## 2024-08-15 NOTE — PLAN OF CARE
Pt A&Ox4. VSS on 4-6L O2 overnight. Minimal pain at chest tube insertion site. Denies SOB, dizziness, lightheadedness, chest pain and n/v. Tele a-flutter rate controlled. NPO since 0000 incase of new chest tube placement today. Discharge pending.   Problem: Adult Inpatient Plan of Care  Goal: Optimal Comfort and Wellbeing  Outcome: Progressing  Intervention: Monitor Pain and Promote Comfort  Recent Flowsheet Documentation  Taken 8/15/2024 0040 by Lilliam Pedroza RN  Pain Management Interventions:   repositioned   rest   quiet environment facilitated  Intervention: Provide Person-Centered Care  Recent Flowsheet Documentation  Taken 8/15/2024 0400 by Lilliam Pedroza RN  Trust Relationship/Rapport:   care explained   emotional support provided   empathic listening provided  Taken 8/15/2024 0040 by Lilliam Pedroza RN  Trust Relationship/Rapport:   care explained   emotional support provided   empathic listening provided     Problem: Risk for Delirium  Goal: Improved Sleep  Outcome: Progressing     Problem: Fall Injury Risk  Goal: Absence of Fall and Fall-Related Injury  Outcome: Progressing  Intervention: Identify and Manage Contributors  Recent Flowsheet Documentation  Taken 8/15/2024 0400 by Lilliam Pedroza RN  Medication Review/Management: medications reviewed  Taken 8/15/2024 0040 by Lilliam Pedroza RN  Medication Review/Management: medications reviewed  Intervention: Promote Injury-Free Environment  Recent Flowsheet Documentation  Taken 8/15/2024 0400 by Lilliam Pedroza RN  Safety Promotion/Fall Prevention:   activity supervised   clutter free environment maintained   increased rounding and observation   increase visualization of patient   room door open   room near nurse's station   safety round/check completed  Taken 8/15/2024 0040 by Lilliam Pedroza RN  Safety Promotion/Fall Prevention:   activity supervised   clutter free environment maintained   increased rounding and observation   increase visualization of  patient   room door open   room near nurse's station   safety round/check completed   Goal Outcome Evaluation:

## 2024-08-15 NOTE — PLAN OF CARE
Pt A&Ox4, able to make needs known. Remains on 3-5L NC, reports some SOB with exertion. Denies pain, N/V, CP. Chest tube remains in place, dressing c/d/I. Shifting self in bed, refusing q2h turns. Plan for procedure 8/16, NPO at midnight, pt aware. Family at bedside, attentive to pt. Pt refused to work with therapies this shift. Did not get out of bed this shift, educated pt on importance of moving and getting out of bed.    Rocio Burrell RN on 8/15/2024 at 6:24 PM

## 2024-08-16 ENCOUNTER — APPOINTMENT (OUTPATIENT)
Dept: OCCUPATIONAL THERAPY | Facility: CLINIC | Age: 74
DRG: 871 | End: 2024-08-16
Payer: COMMERCIAL

## 2024-08-16 ENCOUNTER — ANESTHESIA (OUTPATIENT)
Dept: SURGERY | Facility: HOSPITAL | Age: 74
DRG: 166 | End: 2024-08-16
Payer: COMMERCIAL

## 2024-08-16 ENCOUNTER — APPOINTMENT (OUTPATIENT)
Dept: CT IMAGING | Facility: CLINIC | Age: 74
DRG: 871 | End: 2024-08-16
Payer: COMMERCIAL

## 2024-08-16 LAB
ANION GAP SERPL CALCULATED.3IONS-SCNC: 4 MMOL/L (ref 7–15)
BUN SERPL-MCNC: 17.6 MG/DL (ref 8–23)
CALCIUM SERPL-MCNC: 9 MG/DL (ref 8.8–10.4)
CHLORIDE SERPL-SCNC: 104 MMOL/L (ref 98–107)
CREAT SERPL-MCNC: 0.54 MG/DL (ref 0.51–0.95)
EGFRCR SERPLBLD CKD-EPI 2021: >90 ML/MIN/1.73M2
ERYTHROCYTE [DISTWIDTH] IN BLOOD BY AUTOMATED COUNT: 17.3 % (ref 10–15)
GLUCOSE BLDC GLUCOMTR-MCNC: 112 MG/DL (ref 70–99)
GLUCOSE BLDC GLUCOMTR-MCNC: 156 MG/DL (ref 70–99)
GLUCOSE BLDC GLUCOMTR-MCNC: 199 MG/DL (ref 70–99)
GLUCOSE BLDC GLUCOMTR-MCNC: 275 MG/DL (ref 70–99)
GLUCOSE SERPL-MCNC: 110 MG/DL (ref 70–99)
HCO3 SERPL-SCNC: 34 MMOL/L (ref 22–29)
HCT VFR BLD AUTO: 36.6 % (ref 35–47)
HGB BLD-MCNC: 10.4 G/DL (ref 11.7–15.7)
MAGNESIUM SERPL-MCNC: 2.2 MG/DL (ref 1.7–2.3)
MCH RBC QN AUTO: 24.2 PG (ref 26.5–33)
MCHC RBC AUTO-ENTMCNC: 28.4 G/DL (ref 31.5–36.5)
MCV RBC AUTO: 85 FL (ref 78–100)
PLATELET # BLD AUTO: 211 10E3/UL (ref 150–450)
POTASSIUM SERPL-SCNC: 4.2 MMOL/L (ref 3.4–5.3)
RBC # BLD AUTO: 4.3 10E6/UL (ref 3.8–5.2)
SODIUM SERPL-SCNC: 142 MMOL/L (ref 135–145)
WBC # BLD AUTO: 8.2 10E3/UL (ref 4–11)

## 2024-08-16 PROCEDURE — 258N000003 HC RX IP 258 OP 636: Performed by: INTERNAL MEDICINE

## 2024-08-16 PROCEDURE — 250N000013 HC RX MED GY IP 250 OP 250 PS 637: Performed by: INTERNAL MEDICINE

## 2024-08-16 PROCEDURE — 99231 SBSQ HOSP IP/OBS SF/LOW 25: CPT | Mod: FS

## 2024-08-16 PROCEDURE — 99222 1ST HOSP IP/OBS MODERATE 55: CPT | Performed by: STUDENT IN AN ORGANIZED HEALTH CARE EDUCATION/TRAINING PROGRAM

## 2024-08-16 PROCEDURE — 120N000013 HC R&B IMCU

## 2024-08-16 PROCEDURE — 250N000009 HC RX 250: Performed by: INTERNAL MEDICINE

## 2024-08-16 PROCEDURE — 83735 ASSAY OF MAGNESIUM: CPT | Performed by: STUDENT IN AN ORGANIZED HEALTH CARE EDUCATION/TRAINING PROGRAM

## 2024-08-16 PROCEDURE — 85027 COMPLETE CBC AUTOMATED: CPT | Performed by: STUDENT IN AN ORGANIZED HEALTH CARE EDUCATION/TRAINING PROGRAM

## 2024-08-16 PROCEDURE — 80048 BASIC METABOLIC PNL TOTAL CA: CPT | Performed by: STUDENT IN AN ORGANIZED HEALTH CARE EDUCATION/TRAINING PROGRAM

## 2024-08-16 PROCEDURE — 97535 SELF CARE MNGMENT TRAINING: CPT | Mod: GO

## 2024-08-16 PROCEDURE — 250N000011 HC RX IP 250 OP 636: Performed by: STUDENT IN AN ORGANIZED HEALTH CARE EDUCATION/TRAINING PROGRAM

## 2024-08-16 PROCEDURE — 99233 SBSQ HOSP IP/OBS HIGH 50: CPT | Performed by: INTERNAL MEDICINE

## 2024-08-16 PROCEDURE — 250N000012 HC RX MED GY IP 250 OP 636 PS 637: Performed by: INTERNAL MEDICINE

## 2024-08-16 PROCEDURE — 99233 SBSQ HOSP IP/OBS HIGH 50: CPT | Performed by: EMERGENCY MEDICINE

## 2024-08-16 PROCEDURE — 250N000011 HC RX IP 250 OP 636: Mod: JZ | Performed by: INTERNAL MEDICINE

## 2024-08-16 PROCEDURE — 71250 CT THORAX DX C-: CPT

## 2024-08-16 RX ORDER — FLUCONAZOLE 2 MG/ML
400 INJECTION, SOLUTION INTRAVENOUS ONCE
Status: COMPLETED | OUTPATIENT
Start: 2024-08-16 | End: 2024-08-16

## 2024-08-16 RX ORDER — PIPERACILLIN SODIUM, TAZOBACTAM SODIUM 3; .375 G/15ML; G/15ML
3.38 INJECTION, POWDER, LYOPHILIZED, FOR SOLUTION INTRAVENOUS ONCE
Status: COMPLETED | OUTPATIENT
Start: 2024-08-16 | End: 2024-08-16

## 2024-08-16 RX ORDER — FLUCONAZOLE 2 MG/ML
200 INJECTION, SOLUTION INTRAVENOUS EVERY 24 HOURS
Status: DISCONTINUED | OUTPATIENT
Start: 2024-08-17 | End: 2024-08-17 | Stop reason: HOSPADM

## 2024-08-16 RX ORDER — PIPERACILLIN SODIUM, TAZOBACTAM SODIUM 3; .375 G/15ML; G/15ML
3.38 INJECTION, POWDER, LYOPHILIZED, FOR SOLUTION INTRAVENOUS EVERY 8 HOURS
Status: DISCONTINUED | OUTPATIENT
Start: 2024-08-16 | End: 2024-08-17 | Stop reason: HOSPADM

## 2024-08-16 RX ADMIN — SENNOSIDES 1 TABLET: 8.6 TABLET, FILM COATED ORAL at 19:51

## 2024-08-16 RX ADMIN — SUCRALFATE 1 G: 1 TABLET ORAL at 17:34

## 2024-08-16 RX ADMIN — FLUCONAZOLE 400 MG: 400 INJECTION, SOLUTION INTRAVENOUS at 19:50

## 2024-08-16 RX ADMIN — Medication 1 SPRAY: at 19:50

## 2024-08-16 RX ADMIN — SENNOSIDES 1 TABLET: 8.6 TABLET, FILM COATED ORAL at 08:51

## 2024-08-16 RX ADMIN — SUCRALFATE 1 G: 1 TABLET ORAL at 08:51

## 2024-08-16 RX ADMIN — METOPROLOL TARTRATE 50 MG: 50 TABLET, FILM COATED ORAL at 22:08

## 2024-08-16 RX ADMIN — DIGOXIN 125 MCG: 125 TABLET ORAL at 08:51

## 2024-08-16 RX ADMIN — GABAPENTIN 600 MG: 600 TABLET, FILM COATED ORAL at 14:03

## 2024-08-16 RX ADMIN — DORNASE ALFA 50 ML: 1 SOLUTION RESPIRATORY (INHALATION) at 22:21

## 2024-08-16 RX ADMIN — ATORVASTATIN CALCIUM 20 MG: 10 TABLET, FILM COATED ORAL at 22:11

## 2024-08-16 RX ADMIN — METOPROLOL TARTRATE 50 MG: 50 TABLET, FILM COATED ORAL at 14:04

## 2024-08-16 RX ADMIN — PIPERACILLIN AND TAZOBACTAM 3.38 G: 3; .375 INJECTION, POWDER, FOR SOLUTION INTRAVENOUS at 17:34

## 2024-08-16 RX ADMIN — SUCRALFATE 1 G: 1 TABLET ORAL at 14:03

## 2024-08-16 RX ADMIN — PREDNISONE 40 MG: 20 TABLET ORAL at 08:50

## 2024-08-16 RX ADMIN — DILTIAZEM HYDROCHLORIDE 120 MG: 120 CAPSULE, COATED, EXTENDED RELEASE ORAL at 14:04

## 2024-08-16 RX ADMIN — GABAPENTIN 600 MG: 600 TABLET, FILM COATED ORAL at 08:51

## 2024-08-16 RX ADMIN — PIPERACILLIN AND TAZOBACTAM 3.38 G: 3; .375 INJECTION, POWDER, FOR SOLUTION INTRAVENOUS at 23:32

## 2024-08-16 RX ADMIN — FLUTICASONE FUROATE AND VILANTEROL TRIFENATATE 1 PUFF: 200; 25 POWDER RESPIRATORY (INHALATION) at 08:48

## 2024-08-16 RX ADMIN — PANTOPRAZOLE SODIUM 40 MG: 40 TABLET, DELAYED RELEASE ORAL at 08:49

## 2024-08-16 RX ADMIN — DORNASE ALFA 50 ML: 1 SOLUTION RESPIRATORY (INHALATION) at 10:33

## 2024-08-16 RX ADMIN — GABAPENTIN 600 MG: 600 TABLET, FILM COATED ORAL at 19:51

## 2024-08-16 ASSESSMENT — ACTIVITIES OF DAILY LIVING (ADL)
ADLS_ACUITY_SCORE: 45
ADLS_ACUITY_SCORE: 47
ADLS_ACUITY_SCORE: 45
ADLS_ACUITY_SCORE: 47
ADLS_ACUITY_SCORE: 45
ADLS_ACUITY_SCORE: 47
ADLS_ACUITY_SCORE: 45
ADLS_ACUITY_SCORE: 47
ADLS_ACUITY_SCORE: 45
ADLS_ACUITY_SCORE: 45
ADLS_ACUITY_SCORE: 47
ADLS_ACUITY_SCORE: 47
ADLS_ACUITY_SCORE: 43
ADLS_ACUITY_SCORE: 45

## 2024-08-16 NOTE — PROGRESS NOTES
General Surgery Progress Note:    Hospital Day # 6    ASSESSMENT:     1. Pleural effusion    2. Tension hydrothorax    3. Atrial fibrillation with rapid ventricular response (H)    4. Lactic acidosis    5. Acute and chronic respiratory failure with hypercapnia (H)        Mary Kay Tejada is a 74 year old female with complicated PMH including pacemaker, Aortic stenosis s/p TAVR, HFpEF, COPD, AFib on eliquis, 7/12-7/17 admit ARF pneumonia + COVID w/PTX s/p thoracentesis improvement without chest tube now presenting 8/10 w/SOB and Lg right pleural effusion w/ Chest tube in ED, repositioned 14 Fr 8/11 + Dx bronchoscopy w/ Cx candida albicans, E.coli, yeast no lesions also postop A.flutter now on digoxin + diltiazem. On imaging, pleural effusion resolved w/ chest tube 8/11, recurred 8/13 and now large 8/15 with decreased output in chest tube.     Had planned VATS procedure for today, however with further discussion with pulmonology here and [facility] thoracic believe the patient is in need of higher level facility and likely specialty team for procedure due to potential for larger surgery / complications. Recommend transferring at earliest available time as patient is requiring 5L oxygen and chest tube is in place but not greatly improving effusion    ADDENDUM 1140: [facility] attempted to call the thoracic team at the [facility], unfortunately halted transfer as patients mortality / morbidity risk is quite high. Discussion with surgeon ongoing as to future approach as we are able to manage chest tubes inpatient but do not have supplies or follow up capabilities for patient to be managed long term by our team.   -patient to remain here at this time, will continue discussions    PLAN:   -Okay for diet per surgical perspective  -Continue chest tube to -20 cm H2O with continuous wall suction  -Continue supplemental oxygen PRN for >90% saturations  -No surgical intervention at this time, we are recommending transfer at earliest  availability to higher level of care due to patient recurrence, co-morbidities, and potential for complications that would require more specialty care / higher level than provided at this hospital  -Appreciate [facility] help with transferring, our team has discussed with thoracic at [facility] regarding transfer  -Carlie Julio recommendations  -Medical management per primary team    SUBJECTIVE:   Mary Kay Tejada was seen on rounds. Patients son and  are in the room as well and attentive to conversation.    Patient states her shortness of breath comes and goes, she is sore around the chest tube site. She is concerned about any surgery    Patient Vitals for the past 24 hrs:   BP Temp Temp src Pulse Resp SpO2 Weight   08/16/24 0851 101/53 -- -- 108 -- -- --   08/16/24 0804 -- 98.2  F (36.8  C) Oral -- 20 90 % --   08/16/24 0800 97/53 -- -- 81 -- 93 % --   08/16/24 0451 -- -- -- -- -- -- 72.2 kg (159 lb 1.6 oz)   08/16/24 0444 98/57 97.8  F (36.6  C) Oral -- -- -- --   08/16/24 0029 98/56 97.6  F (36.4  C) Oral -- -- -- --   08/15/24 2122 102/55 -- -- 78 -- 91 % --   08/15/24 2100 -- -- -- 78 -- 91 % --   08/15/24 2012 105/53 98.2  F (36.8  C) Oral 78 18 -- --   08/15/24 1900 -- -- -- 79 -- 94 % --   08/15/24 1700 -- -- -- 80 -- 91 % --   08/15/24 1600 -- -- -- -- -- 90 % --   08/15/24 1550 103/55 98.8  F (37.1  C) Oral 80 22 91 % --   08/15/24 1202 -- -- -- -- -- 92 % --   08/15/24 1200 -- -- -- -- -- (!) 86 % --   08/15/24 1130 105/61 98.1  F (36.7  C) Oral 81 -- 91 % --       Physical Exam:  General: patient seen resting in bed in no acute distress  Resp: no increased work of breathing, breathing on 5L oxygen. Emphysematous lungs. Chest tube draining serosanguinous fluids in container with -20 cm water and wall continous suction.  Off of suction did not visualized bubbling, was not tidaling much. On suction no bubbling. Appears tube is patent and visualized output moving through after milking the  tube.  Abdomen: generally soft nontender  Extremities: No edema, cyanosis, or obvious deformities visualized on exam    No results displayed because visit has over 200 results.           Araceli Figueroa PA-C  [facility] General Surgery  [facility]  [address]  [place], [place] [id number] [place] [address] [phone]

## 2024-08-16 NOTE — PROGRESS NOTES
Hospitalist update:    CT chest without contrast today  Showing her persistent large right  Pleural effusion now larger than CT  On 8/13 along with complete opacification  Of RUL, RLL and right mainstem bronchus.    Per discussion with GS the recommendation  At a minimum would be a larger bore CT  To be done with IR assist for more accurate  Placement. This would need to be done at  Either [facility] or [facility].      At this time the patient wishes to remain  At [facility] deferring the placement  Of a larger bore CT.      Discussed with nursing.    PRITI Kaiser.  HMS

## 2024-08-16 NOTE — PROGRESS NOTES
"Writer notified by Jean Alexandra DO that he was \"going to hold off on surgery tomorrow.\" Pt was scheduled for VATS procedure tomorrow at 0730. Pt giving writer brief answers to questions this shift. When asked what her thoughts were on not wanting to go through with the surgery, she stated \"she did not want to.\" Writer discontinued NPO at 0000 order, the rest of the sign & helds are still in place.   "

## 2024-08-16 NOTE — PROGRESS NOTES
"Transfer Type: [facility]  Transfer Triage Note    Date of call: 08/16/24  Time of call: 11:07 AM    Current Patient Location:  [facility]  Current Level of Care: ICU    Vitals: Temp: 98.2  F (36.8  C) Temp src: Oral BP: 101/53 Pulse: 108   Resp: 20 SpO2: 90 % Height: 165.1 cm (5' 5\") Weight: 72.2 kg (159 lb 1.6 oz)  O2 Device: Nasal cannula at Oxygen Delivery: 5 LPM  Diagnosis: Recurrent right-sided empyema  Reason for requested transfer: Procedure can be done here and not at referring hospital   Isolation Needs: None    Care everywhere has been updated and reviewed: Yes  Necessary images have been sent through PACS: Yes    If patient is transferring for specialty care or specific procedure, the specialist required has participated in the transfer call and agreed with need for transfer and anticipated timeline: Yes, Provider name: Dr. Culp specialty with: thoracic surgery    Transfer accepted: No.    Rationale for declining transfer or suggested follow-up: Patient high risk for decortication with recommendations for management conveyed by thoracic surgery. [facility] to convey recommendations to surgery for upsizing of tube to \"empyema\" chest tube and reach out in upcoming days to request transfer if further thoracic surgery input needed     Recommendations for Management and Stabilization: Feedback was provided to improve patient care, however transfer was not felt to be warranted at this time.    Additional Comments:   Mary Kay Tejada is a 74 year old female with complex past medical history HTN, CAD, HFpEF, afib on DOAC, aortic stenosis s/p TAVR, s/p pacemaker, COPD on 3 L at baseline admitted with recurrent R sided empyema with pleural fluid cultures positive for strep pneumoniae. Per Dr. Culp, this patient is likely high risk for decortication and would benefit from empyema tube with need for placement for long period. As thoracic surgery is unlikely to offer additional intervention, I have " suggested to the [facility] to convey these recommendations to the surgery service there and call back following chest tube trial if further input and reevaluation for transfer is needed.     Transfer declined.    Angela Elmore MD

## 2024-08-16 NOTE — PROGRESS NOTES
General surgery progress note      CT chest shoes progression of the pleural effusion and fully collapsed lung on the right with infiltration into the bronchi. Patient only new symptom is coughing during the CT, which is new and she noticed a change with this. Patient is currently on 4L oxygen, earlier required 5-6L oxygen. She is currently eating pasta for dinner.    Discussion with surgeon, HMS, IR on call, nursing, patient and myself regarding plan.    Discussed recommendation of upsizing current chest tube to a larger tube with patient which would be best done by radiology under imaging. Unfortunately this procedure is not done at [facility], but is done at [place]. Discussed with patient that general surgery could upsize the chest tube but with her recurrence and tenuous lungs the risk of complications would be greater than under imaging with IR.   Patient needs a moment to discuss with family and process this new information. She would like to see if the new IV antibiotics will have any helpful effects for her. After discussion however patient would like to get better and so would like to reassess in the morning and would likely agree with chest tube placement.      Plan:  -NPO at midnight  -Coordination with IR, RAJAN, and transferring to [facility] will be done tomorrow for likely upsizing chest tube at [facility] tomorrow  -Uncertain if patient will stay at [facility] after or return to  at this point; somewhat dependent on patient tolerance of procedure and bed availability    APRIL Salmeron [facility] General Surgery

## 2024-08-16 NOTE — PROGRESS NOTES
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   Recent admission  to  with acute hypercarbic respiratory failure, pneumonia and COVI19 viral infection. CT scan showed RUL collapse, right effusion. Sputum Cx (+) E coli. Developed pneumothorax post right side thoracentesis. Improvement of pneumothorax in serial CXR. Discharged home on O2 supplementation 3 LPM, and Abx and follow up in clinic.   Presents to ED on 8/10/2024 for evaluation of shortness of breath. Patient was in moderate respiratory distress, hypotensive, hypoxic.   Labs showed elevated LFTs, elevated WBC, negative procalcitonin. Acute on chronic respiratory acidosis. CXR showed large right pleural effusion. Chest tube was placed on admission. Chest CT scan showed RUL and RLL atelectasis, debris in airway and moderate pleural effusion.   Admitted to ICU , treated for pneumonia. Required pressors.   Underwent reposition chest tube on  and diagnostic bronchoscopy. No endobronchial lesions. Developed a flutter with RVR, started on amiodarone drip, later discontinued due to prolonged QT. Patient tolerated digoxin and diltiazem.   Pleural fluid (+) strep pneumonia. Follow up chest CT scan showed loculated effusion, started on intrapleural lytics. Follow up CXR showed large right effusion.      Acute on chronic respiratory failure   Empyema  S/p chest tube  8/10 and reposition on 8/11 with resolution of pleural effusion.   Pleural fluid culture is growing Strep pneumonia, E. Coli and candida.   Follow up CT scan 8/13 showed re accumulation of pleural effusion, loculated effusion.   Started on intrapleural lytics 8/14, last dose 9p 8/16.   Follow up CXR 8/15 showed large right effusion with total opacification of right hemithorax   Surgery consulted to eval for for VATS and decortication.    Pneumonia   CT scan showed debris in airway. Chronic collapse right upper lobe. Atelectasis RLL.   S/p diagnostic bronchoscopy 8/11, oral candidiasis, hyperemic mucosa, and mild thick secretions, no endobronchial lesions. BAL RUL and RLL were done.  BAL grew E coli. Sputum Cx grew E coli and strep pneumonia.   Diet per speech therapy.   COPD exacerbation  Steroid taper:  Continue schedule bronchodilators   Resolved a fib / flutter with RVR  Cardiology is following. Off amiodarone due to prolonged QT interval.   On digoxin, metoprolol and diltiazem  Patient is anticoagulated.   Oral & pleural candidiasis   HTN, CAD, HFpEF, aortic stenosis s/p TAVR, s/p pacemaker  Tobacco user     Advance Directives:  Full code     Plan:   Titrate FiO2 to keep SpO2 > 90%  Schedule bronchodilators  Saline nebs   Taper systemic steroids: decrease prednisone from 40->30mg po daily on 8/17   Cont intra-pleural thrombolytics - last dose 8/16 9p  Follow-up non-contrast chest CT 9/17 to re-eval loculations post-thrombolytics  Surgery following for possible VATS and decortication  Abx: cont ceftriaxone for now, ID consult requested for long-term recs and follow-up, possible anti-fungal given candida from 2 sources (oral and pleural)  Oral nystatin swish and swallow , oral care.   Titrate BP meds  Cardiology service is following   Monitor kidney function   Supplement electrolytes as needed  Diet per speech therapy   Glucose level monitoring   DVT prophylaxis, apixaban on hold for now      Bao Pereyra,  "MD  Pulmonary / Critical Care        Subjective   HPI:  Mary Kay Tejada is a 74 year old female with history of HTN, CAD, HFpEF, afib on DOAC, aortic stenosis s/p TAVR, s/p pacemaker, COPD, tobacco user.   Recent admission 7/12 to 7/17 with acute hypercarbic respiratory failure, pneumonia and COVI19 viral infection. CT scan showed RUL collapse, right effusion. Sputum Cx (+) E coli. Developed pneumothorax post right side thoracentesis. Improvement of pneumothorax in serial CXR. Discharged home on O2 supplementation 3 LPM, and Abx and follow up in clinic.   Presents to ED on 8/10/2024 for evaluation of shortness of breath. Patient was in moderate respiratory distress, hypotensive, hypoxic.   Labs showed elevated LFTs, elevated WBC, negative procalcitonin. Acute on chronic respiratory acidosis. CXR showed large right pleural effusion. Chest tube was placed on admission. Chest CT scan showed RUL and RLL atelectasis, debris in airway and moderate pleural effusion.   Admitted to ICU , treated for pneumonia. Required pressors.   Underwent reposition chest tube on 8/11 and diagnostic bronchoscopy. No endobronchial lesions. Developed a flutter with RVR, started on amiodarone drip, later discontinued due to prolonged QT. Patient tolerated digoxin and diltiazem.   Pleural fluid (+) strep pneumonia. Follow up chest CT scan showed loculated effusion, started on intrapleural lytics. Follow up CXR showed large right effusion.      Events overnight:  Patient reports feeling intermittent \"hot flashes\"    Medical Care Time excluding procedures and family discussions greater than: 1 Hour    Risk Factors Present on Admission:  Clinically Significant Risk Factors              # Hypoalbuminemia: Lowest albumin = 2.6 g/dL at 8/12/2024  5:02 AM, will monitor as appropriate     # Hypertension: Noted on problem list           # Overweight: Estimated body mass index is 26.48 kg/m  as calculated from the following:    Height as of this " "encounter: 1.651 m (5' 5\").    Weight as of this encounter: 72.2 kg (159 lb 1.6 oz).      # Financial/Environmental Concerns: none   # Pacemaker present            Bao Pereyra MD  Pulmonary and Critical Care Attending      Allergies   Allergen Reactions    Celebrex [Celecoxib] Rash     patient had butterfly rash - \"lupus-like\"      Latex Rash       Meds: See MAR    Physical Exam:  /53 (BP Location: Left arm)   Pulse 108   Temp 98.2  F (36.8  C) (Oral)   Resp 20   Ht 1.651 m (5' 5\")   Wt 72.2 kg (159 lb 1.6 oz)   LMP  (LMP Unknown)   SpO2 90%   BMI 26.48 kg/m    Intake/Output this shift:  I/O this shift:  In: 520 [P.O.:470; Other:50]  Out: 0   GEN: sitting up in bed, NAD  HEENT: PERRLA, EOMI  CVS: regular rhythm, no murmurs  RESP: symmetric expansion, nl respiratory effort  ABD: Soft, No abdominal pain with palpation, no guarding, no rigidity  EXT: Warm, well perfused, no edema  NEURO: moving all extremities, nonfocal  PSYCH: pleasant    Pertinent Labs: Latest lab results in EHR personally reviewed.   CMP  Recent Labs   Lab 08/16/24  0733 08/16/24  0603 08/15/24  2121 08/15/24  1659 08/15/24  0735 08/15/24  0549 08/14/24  0726 08/14/24  0512 08/13/24  0733 08/13/24  0513 08/12/24  0748 08/12/24  0502 08/11/24  0917 08/11/24  0400 08/10/24  1836 08/10/24  0937   NA  --  142  --   --   --   --   --   --   --  139  --  137  --  143  --  137   POTASSIUM  --  4.2  --   --   --  4.1  --  4.3  --  4.4  --  3.9   < > 3.3*  --  5.3   CHLORIDE  --  104  --   --   --   --   --   --   --  101  --  97*  --  105  --  100   CO2  --  34*  --   --   --   --   --   --   --  33*  --  29  --  32*  --  27   ANIONGAP  --  4*  --   --   --   --   --   --   --  5*  --  11  --  6*  --  10   * 110* 209* 178*   < >  --    < >  --    < > 197*   < > 351*   < > 184*   < > 222*   BUN  --  17.6  --   --   --   --   --   --   --  26.6*  --  26.4*  --  23.1*  --  23.5*   CR  --  0.54  --   --   --   --   --   --   --  0.68  --  " 0.85  --  0.75  --  0.93   GFRESTIMATED  --  >90  --   --   --   --   --   --   --  >90  --  71  --  83  --  64   JOEY  --  9.0  --   --   --   --   --   --   --  9.2  --  8.3*  --  8.4*  --  9.4   MAG  --  2.2  --   --   --  2.3  --  2.4*  --  2.5*  --  2.6*  --  2.2  --  2.7*   PROTTOTAL  --   --   --   --   --   --   --  5.7*  --  5.9*  --  5.6*  --   --   --  7.0  7.0   ALBUMIN  --   --   --   --   --   --   --  3.0*  --  2.8*  --  2.6*  --   --   --  3.1*   BILITOTAL  --   --   --   --   --   --   --  0.3  --  0.3  --  0.3  --   --   --  1.2   ALKPHOS  --   --   --   --   --   --   --  154*  --  171*  --  188*  --   --   --  239*   AST  --   --   --   --   --   --   --  26  --  54*  --  97*  --   --   --  186*   ALT  --   --   --   --   --   --   --  107*  --  149*  --  173*  --   --   --  99*    < > = values in this interval not displayed.     CBC  Recent Labs   Lab 08/16/24  0603 08/13/24  0513 08/12/24  0502 08/11/24  0400   WBC 8.2 8.6 12.0* 10.8   RBC 4.30 3.78* 3.64* 3.77*   HGB 10.4* 9.3* 9.0* 9.3*   HCT 36.6 31.9* 31.0* 31.3*   MCV 85 84 85 83   MCH 24.2* 24.6* 24.7* 24.7*   MCHC 28.4* 29.2* 29.0* 29.7*   RDW 17.3* 17.3* 17.3* 17.2*    174 220 180     INRNo lab results found in last 7 days.  Arterial Blood Gas  Recent Labs   Lab 08/10/24  1331 08/10/24  0937   O2PER 49 36       Cultures: personally reviewed.       Imaging: personally reviewed.   Results for orders placed during the hospital encounter of 08/10/24    XR Chest Port 1 View    Narrative  EXAM: XR CHEST PORT 1 VIEW  LOCATION: [facility]  DATE: 8/10/2024    INDICATION: RN placed PICC   verify tip placement  COMPARISON: 8/10/2024    Impression  IMPRESSION:    New right PICC with tip near the cavoatrial junction.    Otherwise, no significant interval change.      XR Chest Port 1 View    Narrative  EXAM: XR CHEST PORTABLE 1 VIEW  LOCATION: [facility]  DATE: 08/10/2024    INDICATION:  Status post right pigtail chest tube.  COMPARISON: 08/10/2024 at 1010 hours.    Impression  IMPRESSION: No change in dual-lead cardiac pacer or TAVR. Right-sided chest tube has been placed since comparison study with decrease in the previously seen large right pleural effusion. A moderate to large pleural effusion remains. There is likely  overlying compressive atelectasis of the inferior portion of the right lung with concurrent infectious process not excluded. There is some indistinctness of the pulmonary interstitium involving the left lower lobe which is new from prior study and may  reflect airway inflammation or edema.    Results for orders placed during the hospital encounter of 07/12/24    CT Chest Pulmonary Embolism w Contrast    Narrative  EXAM: CT CHEST PULMONARY EMBOLISM W CONTRAST  LOCATION: [facility]  DATE: 7/12/2024    INDICATION: covid positive, hypoxia, eval for pneumonia, PE  COMPARISON: 2/6/2024  TECHNIQUE: CT chest pulmonary angiogram during arterial phase injection of IV contrast. Multiplanar reformats and MIP reconstructions were performed. Dose reduction techniques were used.  CONTRAST: 75 ML ISOVUE 370    FINDINGS:  ANGIOGRAM CHEST: No pulmonary artery embolism.    LUNGS AND PLEURA: Since February 2024, development of right upper lobe collapse with right upper lobe airway becoming completely opacified shortly after its origin. No significant change of middle and right lower lobe volume loss with patent middle and  right lower lobe airways. Moderate right pleural effusion. No pneumothorax.    MEDIASTINUM/AXILLAE: Compromised evaluation of right perihilar adenopathy from adjacent volume loss. No significant mediastinal or left perihilar adenopathy. Normal heart size. No pericardial effusion. Aortic valvular prosthetic. Dual-chamber pacer.  Patulous esophagus.    CORONARY ARTERY CALCIFICATION: Moderate.    UPPER ABDOMEN: No actionable findings.    MUSCULOSKELETAL: Bony  demineralization and degenerative changes.    Impression  IMPRESSION:    1.  No pulmonary embolism.    2.  Interval complete right upper lobe collapse with opacification of the proximal right upper lobe airway of indeterminate etiology. Recommend pulmonary follow-up and direct visualization or repeat chest CT to exclude an obstructing lesion. Retained  airway debris.    3.  No significant change of moderate right pleural effusion, of indeterminate etiology.    4.  Moderately advanced emphysema.    No results found for this or any previous visit.    No results found for this or any previous visit.      Patient Active Problem List   Diagnosis    Abnormal Weight Loss    Osteoarthritis Of The Shoulder    Chronic Constipation    Onychomycosis Of The Toenails    Diarrhea    Ganglion Of The Left Wrist    Larynx Edema    Obesity    Medial Epicondylitis    Skin Lesion    Urinary Incontinence    Moderate episode of recurrent major depressive disorder (H)    Dry Eye Syndrome    Nicotine Dependence    Hypokalemia    Essential hypertension    Muscle Cramps In The Calf    Edema    Atrial flutter (H)    Lump In / On The Skin    Chronic pain syndrome    Paroxysmal Atrial Fibrillation    Fibromyalgia    Nausea    Abdominal Pain    Peptic Ulcer    Mixed hyperlipidemia    Chronic Reflux Esophagitis    Benign Adenomatous Polyp Of The Large Intestine    Vertigo    Rotator cuff tendonitis    Generalized osteoarthrosis, involving multiple sites    Tendonitis of wrist, left    Trigger point of thoracic region    Flashers or floaters of right eye    Menopause    Tendonitis of wrist, right    Skin lesion of face    Hematoma of abdominal wall    Headache    Cervical neck pain with evidence of disc disease    Controlled substance agreement signed    Aspiration pneumonia (H)    Type 2 diabetes mellitus without complication (H)    Candidal intertrigo    Osteoarthritis, hip, bilateral    Primary osteoarthritis of left knee    Osteoarthritis of  both knees    Syncope    Opacity of lung on imaging study    Acute gastritis    Gastroenteritis    Aortic valve stenosis, etiology of cardiac valve disease unspecified    Bunion, left    Callus of foot    Cataract    Chronic anemia    Anemia due to blood loss, acute    Diabetic polyneuropathy associated with type 2 diabetes mellitus (H)    Upper GI bleed    Melena    Dyslipidemia    Skin lesion    Mixed stress and urge urinary incontinence    Primary osteoarthritis of both knees    Chronic gastric ulcer without hemorrhage and without perforation    Atrial fibrillation, unspecified type (H)    Nicotine dependence    COPD exacerbation (H)    Acute on chronic respiratory failure with hypoxia (H)    Pneumonia of right lower lobe due to infectious organism    Dyspnea, unspecified type    Acute and chronic respiratory failure with hypercapnia (H)    Hypoxia    SOB (shortness of breath)    Synovial cyst of knee, unspecified laterality    Atrial flutter, unspecified type (H)    Hypotension, unspecified hypotension type    Pleural effusion    Community acquired pneumonia    Pulmonary emphysema, unspecified emphysema type (H)    Protein-calorie malnutrition (H24)    F11.2 - Continuous opioid dependence (H)    Dizziness    Generalized abdominal pain    S/P TAVR (transcatheter aortic valve replacement)    Aortic stenosis, severe    Chronic heart failure with preserved ejection fraction (H)    Sinus bradycardia    Acute on chronic respiratory failure with hypoxemia (H)    Pneumonia due to 2019 novel coronavirus    Lactic acidosis    Atrial fibrillation with rapid ventricular response (H)    Tension hydrothorax         Surgical History  She  has a past surgical history that includes biopsy of breast, incisional; SUPRACERV ABD HYSTERECTOMY; Pr Ablate Heart Dysrhythm Focus; Hysterectomy; Esophagoscopy, gastroscopy, duodenoscopy (EGD), combined (N/A, 11/06/2018); joint replacement (Left); Colonoscopy (N/A, 06/14/2019); Biopsy breast  "(Right); Biopsy breast (Right, 01/28/2015); Biopsy breast (Right, 01/28/2015); PICC/Midline Placement (01/12/2023); Coronary Angiogram (N/A, 02/08/2024); Right Heart Catheterization (N/A, 02/08/2024); Left Heart Catheterization (N/A, 02/08/2024); Transcatheter Aortic Valve Replacement-Femoral Approach (N/A, 02/20/2024); Or Transcatheter Aortic Valve Replacement, Femoral Percutaneous Approach ([place]) (N/A, 02/20/2024); Transcatheter Aortic-Valve Replacement; Pacemaker Device & Lead Implant - HIS Lead Dual (N/A, 3/7/2024); and PICC/Midline Placement (8/10/2024).    Family History  Reviewed, and family history includes Alcoholism in her brother, father, sister, and sister; Cancer in her paternal uncle, paternal uncle and another family member; Heart Failure in her mother; No Known Problems in her daughter, maternal aunt, maternal grandfather, maternal grandmother, paternal aunt, paternal grandfather, and paternal grandmother.    Social History  Reviewed, and she  reports that she has been smoking cigarettes. She has never been exposed to tobacco smoke. She has never used smokeless tobacco. She reports current alcohol use. She reports that she does not use drugs.    Allergies  Allergies   Allergen Reactions    Celebrex [Celecoxib] Rash     patient had butterfly rash - \"lupus-like\"      Latex Rash        Bao Pereyra MD  Pulmonary and Critical Care Attending    Securely message with the Vocera Web Console (learn more here)  "

## 2024-08-16 NOTE — CONSULTS
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  History of aortic stenosis status post TAVR.  Status post pacemaker placement  Recent history of COVID-19 infection complicated with secondary bacterial pneumonia in middle 2024.  Sputum cultures on 2024 with E. coli.  Right upper lobe collapse status post thoracentesis on 7/15/2024.  Readmitted with recurrent right-sided pleural effusion status post bronchoscopy on 2024.  Cultures with E. coli and Candida albicans.  Pleural effusion cultures positive with Streptococcus pneumonia.  In place.  Attempt for transfer to the [facility] unsuccessful.  Persistent effusion despite Lytics.     Discussion: Persistent right-sided empyema on appropriate antimicrobial therapy is an indication for surgery.  Unfortunately it does not appear the patient is candidate for surgery.  Antibiotics will not drain pleural effusion.  I will however optimize the antimicrobial therapy with changing ceftriaxone to Zosyn and adding IV fluconazole despite the fact that the Candida most likely represent contaminant.    Recommendations:   Discontinue IV ceftriaxone.  Start IV Zosyn.  Start IV fluconazole.  Monitor chest tube output.  Unless appropriate source control is obtained, antibiotics alone will not change a poor outcome.    Discussed with the patient, daughters, and nursing staff.    Thank you for letting us be part of the patient care team. We will follow.    Robyn Trevino,  MD,MD  [facility].   [facility]  Office Telephone [phone].  Fax [phone]  [facility] paging    HPI:    Mary Kay Tejada is a 74 year old old female. History is provided by the patient, chart review.  ID is asked to see this patient for empyema.  The patient has a history of COPD, aortic stenosis status post TAVR, pacemaker in place.  She was admitted in mid July 2024 with COVID complicated with E. coli pneumonia.  The patient was found to have a right-sided effusion at the time which was tapped.  This was complicated with right-sided pneumothorax and right lung collapse.  The patient was discharged on 3 L of oxygen.  Now she is admitted about a week ago for empyema.  Chest tube has been placed.  Cultures with Streptococcus pneumonia and E. coli.  Also grew Candida albicans from bronchoscopy.  Some issues with drainage of the fluid.  Patient was supposed to transfer to the [facility] but this has now been possible because she is not thought to be a good surgical candidate.        ===========================================    Medical History  Past Medical History:   Diagnosis Date    Anemia     Aortic stenosis     Aortic valve disorder     Atrial fibrillation (H)     Atrial flutter (H)     Benign neoplasm of adenomatous polyp     large intestine     Chronic constipation     Chronic heart failure with preserved ejection fraction (H) 02/29/2024    Chronic pain syndrome     Congestive heart failure (H)     COPD (chronic obstructive pulmonary disease) (H)     Oxygen at night     Dependence on supplemental oxygen     Oxygen at noc, during the day as needed    Depression     Diabetes mellitus (H)     Dry eye syndrome     Fibromyalgia     Ganglion     left wrist    GERD (gastroesophageal reflux disease)     Hyperlipidemia     Hypertension     Hypokalemia     Infective otitis externa, unspecified     Created by Conversion     Larynx edema      Lung disease     Malignant neoplasm of vulva (H)     Created by [facility] Annotation: Apr 17 2007  8:24AM - Cammy Bui:  resection per Dr. Alfonso Mane 9/06;  Replacement Utility updated for latest IMO load    Medial epicondylitis     Onychomycosis     Osteoarthritis     Peptic ulcer     Polyneuropathy     Vulvar malignant neoplasm (H)         Surgical History  Past Surgical History:   Procedure Laterality Date    BIOPSY BREAST Right     BIOPSY BREAST Right 01/28/2015    BIOPSY BREAST Right 01/28/2015    Procedure: RIGHT BREAST BIOPSY AFTER WIRE LOCALIZATION AT 0940;  Surgeon: Renée Soriano MD;  Location: [facility];  Service:     BIOPSY OF BREAST, INCISIONAL      Description: Incisional Breast Biopsy;  Recorded: 11/13/2007;  Comments: benign    COLONOSCOPY N/A 06/14/2019    Procedure: COLONOSCOPY;  Surgeon: Eduardo Mora MD;  Location: [facility];  Service: Gastroenterology    CV CORONARY ANGIOGRAM N/A 02/08/2024    Procedure: CV CORONARY ANGIOGRAM;  Surgeon: Moises Valencia MD;  Location: [facility]    CV LEFT HEART CATH N/A 02/08/2024    Procedure: Left Heart Catheterization;  Surgeon: Moises Valencia MD;  Location: [facility]    CV RIGHT HEART CATH MEASUREMENTS RECORDED N/A 02/08/2024    Procedure: Right Heart Catheterization;  Surgeon: Moises Valencia MD;  Location: [facility]    CV TRANSCATHETER AORTIC VALVE REPLACEMENT-FEMORAL APPROACH N/A 02/20/2024    Procedure: Transcatheter Aortic Valve Replacement, possible cardiopulmonary bypass, possible surgical intervention;  Surgeon: Moises Valencia MD;  Location: [facility] CV    EP PACEMAKER DEVICE & IMPLANT- HIS LEAD DUAL N/A 3/7/2024    Procedure: Pacemaker Device & Lead Implant - HIS Lead Dual;  Surgeon: Donnell Leon MD;  Location: [facility] CV    ESOPHAGOSCOPY, GASTROSCOPY, DUODENOSCOPY (EGD), COMBINED N/A 11/06/2018    Procedure:  ESOPHAGOGASTRODUODENOSCOPY;  Surgeon: Lit Fernando MD;  Location: [facility] OR;  Service:     HYSTERECTOMY      JOINT REPLACEMENT Left     TKA    OR TRANSCATHETER AORTIC VALVE REPLACEMENT, FEMORAL PERCUTANEOUS APPROACH (STANDBY) N/A 02/20/2024    Procedure: OR TRANSCATHETER AORTIC VALVE REPLACEMENT, FEMORAL PERCUTANEOUS APPROACH (STANDBY);  Surgeon: Ishmael Farias MD;  Location: [facility] CATH LAB CV    PICC TRIPLE LUMEN PLACEMENT  01/12/2023         PICC TRIPLE LUMEN PLACEMENT  8/10/2024    TN ABLATE HEART DYSRHYTHM FOCUS      Description: Catheter Ablation Atrial Fibrillation;  Recorded: 07/31/2012;  Comments: 7/24/12 PVI with Dr. Gardiner and nilay to all 5 pulm veins and CTI fl ablation line as well.    TRANSCATHETER AORTIC-VALVE REPLACEMENT      ZZC SUPRACERV ABD HYSTERECTOMY      Description: Supracervical Hysterectomy;  Proc Date: 01/01/1985;  Comments: some cervix left!; ovaries intact; done for bleeding            Social History  Reviewed, and she  reports that she has been smoking cigarettes. She has never been exposed to tobacco smoke. She has never used smokeless tobacco. She reports current alcohol use. She reports that she does not use drugs.        Family History  Family History   Problem Relation Age of Onset    Heart Failure Mother     Cancer Other         paternal HX-laryngeal     Alcoholism Sister     No Known Problems Daughter     No Known Problems Maternal Grandmother     No Known Problems Maternal Grandfather     No Known Problems Paternal Grandmother     No Known Problems Paternal Grandfather     No Known Problems Maternal Aunt     No Known Problems Paternal Aunt     Alcoholism Sister     Alcoholism Brother     Alcoholism Father     Cancer Paternal Uncle         Gastric-Alcohol    Cancer Paternal Uncle         gastric-Alcohol    Hereditary Breast and Ovarian Cancer Syndrome No family hx of     Breast Cancer No family hx of     Colon Cancer No family hx of     Endometrial  "Cancer No family hx of     Ovarian Cancer No family hx of       Psychosocial Needs  Social History     Social History Narrative    Not on file     Additional psychosocial needs reviewed per nursing assessment.       Allergies   Allergen Reactions    Celebrex [Celecoxib] Rash     patient had butterfly rash - \"lupus-like\"      Latex Rash        Medications Prior to Admission   Medication Sig Dispense Refill Last Dose    albuterol (PROAIR HFA/PROVENTIL HFA/VENTOLIN HFA) 108 (90 Base) MCG/ACT inhaler INHALE 2 PUFFS INTO THE LUNGS EVERY 4 HOURS AS NEEDED FOR WHEEZING 13.4 g 1 PRN    apixaban ANTICOAGULANT (ELIQUIS) 5 MG tablet Take 1 tablet (5 mg) by mouth 2 times daily 180 tablet 2 8/10/2024 at am    atorvastatin (LIPITOR) 20 MG tablet TAKE 1 TABLET(20 MG) BY MOUTH AT BEDTIME 90 tablet 2 8/9/2024 at pm    budesonide-formoterol (SYMBICORT) 160-4.5 MCG/ACT Inhaler INHALE 2 PUFFS INTO THE LUNGS TWICE DAILY 10.2 g 4 8/10/2024 at am    diltiazem ER COATED BEADS (CARDIZEM CD/CARTIA XT) 120 MG 24 hr capsule TAKE 1 CAPSULE(120 MG) BY MOUTH DAILY 30 capsule 5 8/10/2024 at am    Emollient (EUCERIN ORIGINAL HEALING) LOTN APPLY LIBERALLY TO DRY SKIN TWICE DAILY AS NEEDED   PRN    empagliflozin (JARDIANCE) 10 MG TABS tablet Take 1 tablet (10 mg) by mouth daily 90 tablet 2 8/10/2024 at am    furosemide (LASIX) 20 MG tablet Take 2 tablets (40 mg) by mouth daily 180 tablet 1 8/10/2024 at am    gabapentin (NEURONTIN) 600 MG tablet TAKE 1 TABLET(600 MG) BY MOUTH THREE TIMES DAILY 270 tablet 2 8/10/2024 at am    ipratropium - albuterol 0.5 mg/2.5 mg/3 mL (DUONEB) 0.5-2.5 (3) MG/3ML neb solution INHALE 1 VIAL VIA NEBULIZER EVERY 6 HOURS AS NEEDED FOR SHORTNESS OF BREATH OR WHEEZING 90 mL 0 PRN    metoprolol tartrate (LOPRESSOR) 50 MG tablet Take 1 tablet (50 mg) by mouth 2 times daily 180 tablet 1 8/10/2024 at am    nystatin (NYSTOP) 378705 UNIT/GM external powder APPLY TO THE AFFECTED AREA(S) 2-3 TIMES DAILY AS NEEDED 180 g 0 PRN    " "omeprazole (PRILOSEC) 40 MG DR capsule Take 40 mg by mouth daily   8/10/2024 at am    oxyCODONE IR (ROXICODONE) 10 MG tablet Take 1 tablet (10 mg) by mouth every 6 hours as needed for moderate to severe pain 120 tablet 0 PRN    polyethylene glycol (MIRALAX) 17 GM/Dose powder Take 17 g by mouth daily as needed for constipation   PRN    potassium chloride john ER (KLOR-CON M20) 20 MEQ CR tablet TAKE 1 TABLET(20 MEQ) BY MOUTH DAILY 90 tablet 2 8/10/2024 at am    sennosides (SENOKOT) 8.6 MG tablet Take 1 tablet by mouth 2 times daily   8/10/2024 at am    sucralfate (CARAFATE) 1 GM tablet CRUSH ONE TABLET AND MIX WITH A LLITTLE WATER AND SWALLOW FOUR TIMES DAILY 360 tablet 3 8/10/2024 at am    ACCU-CHEK SOFTCLIX LANCETS lancets [ACCU-CHEK SOFTCLIX LANCETS LANCETS] TEST THREE TIMES DAILY 300 each 3     BD ULTRA-FINE SHORT PEN NEEDLE 31 gauge x 5/16\" Ndle [BD ULTRA-FINE SHORT PEN NEEDLE 31 GAUGE X 5/16\" NDLE] TEST FOUR TIMES DAILY WITH MEALS AND AT BEDTIME 400 each 3     blood glucose (ONETOUCH VERIO IQ) test strip TEST THREE TIMES DAILY 300 strip 1     blood-glucose meter (ONETOUCH VERIO IQ METER) Misc [BLOOD-GLUCOSE METER (ONETOUCH VERIO IQ METER) MISC] Check blood sugar three times a day. 1 each 0     diaper,brief,adult,disposable (ADULT BRIEFS - LARGE) Misc [DIAPER,BRIEF,ADULT,DISPOSABLE (ADULT BRIEFS - LARGE) MISC] Use 3-4 daily as needed for incontinence 120 each 6     generic lancets (FINGERSTIX LANCETS) [GENERIC LANCETS (FINGERSTIX LANCETS)] Dispense brand per patient's insurance at pharmacy discretion. 300 each 0     naloxone (NARCAN) 4 MG/0.1ML nasal spray Spray 1 spray (4 mg) into one nostril alternating nostrils once as needed for opioid reversal every 2-3 minutes until assistance arrives 0.2 mL 0     Nutritional Supplements (ENSURE COMPLETE) LIQD Take 1 Can by mouth daily 7110 mL 11         Review of Systems:  Negative except for findings in the HPI Physical Exam:  Temp:  [97.6  F (36.4  C)-98.2  F (36.8  C)] " 97.9  F (36.6  C)  Pulse:  [] 109  Resp:  [18-20] 18  BP: ()/(53-87) 106/60  SpO2:  [90 %-94 %] 92 %      Gen: Pleasant in no acute distress.  HEENT: NCAT. EOMI. PERRL.  Neck: No bruit, JVD or thyromegaly.  Chest: Chest tube in place.  Lungs: Absent breath sound on right side.  Card: RRR. NSR. No RMG. Peripheral pulses present and symmetric. No edema.  Abd: Soft NT ND. No mass. Normal bowel sounds.  Skin: No rash.  Extr: No edema.  Neuro: Alert and oriented to place time and person.       ============================    Pertinent Labs  personally reviewed.   Recent Labs   Lab 08/16/24  0603 08/13/24  0513 08/12/24  0502   WBC 8.2 8.6 12.0*   HGB 10.4* 9.3* 9.0*   HCT 36.6 31.9* 31.0*    174 220       Recent Labs   Lab 08/16/24  0603 08/14/24  0512 08/13/24  0513 08/12/24  0502     --  139 137   CO2 34*  --  33* 29   BUN 17.6  --  26.6* 26.4*   ALBUMIN  --  3.0* 2.8* 2.6*   ALKPHOS  --  154* 171* 188*   ALT  --  107* 149* 173*   AST  --  26 54* 97*       MICROBIOLOGY DATA:  Personally reviewed   Contains abnormal data Pleural fluid Aerobic Bacterial Culture Routine With Gram Stain  Order: [id number]  Collected 8/10/2024 12:31 PM       Status: Final result       Visible to patient: Yes (not seen)    Specimen Information: Pleural Cavity, Right; Pleural fluid   0 Result Notes  Culture 1+ Streptococcus pneumoniae Abnormal          Gram Stain quantification of host cells and microbiological organisms was done on a cytocentrifuged preparation.            Gram Stain Result No organisms seen      4+ WBC seen   Predominantly PMNs           Resulting Agency: [facility]     Susceptibility     Streptococcus pneumoniae     TAWNYA     Azithromycin <=0.25 ug/mL Susceptible     Ceftriaxone (meningitis) <=0.25 ug/mL Susceptible     Ceftriaxone (non-meningitis) <=0.25 ug/mL Susceptible     Clindamycin <=0.06 ug/mL Susceptible     Levofloxacin 1 ug/mL Susceptible     Penicillin (meningitis) <=0.03 ug/mL Susceptible      Penicillin (non-meningitis) <=0.03 ug/mL Susceptible     Penicillin(oral) <=0.03 ug/mL Susceptible     Vancomycin 0.25 ug/mL Susceptible                  Specimen Collected: 08/10/24 12:31 PM Last Resulted: 08/14/24  8:36 AM         Contains abnormal data Respiratory Aerobic Bacterial Culture with Gram Stain  Order: [id number]  Collected 8/10/2024  6:37 PM       Status: Final result       Visible to patient: Yes (not seen)    Specimen Information: Expectorate; Sputum   0 Result Notes  Culture 2+ Streptococcus pneumoniae Abnormal       1+ Escherichia coli Abnormal                Gram Stain Result  Abnormal   <10 Squamous epithelial cells/low power field      >25 PMNs/low power field      3+ Gram positive cocci              Resulting Agency: [facility]     Susceptibility     Streptococcus pneumoniae Escherichia coli     TAWNYA TAWNYA     Amikacin   <=2 ug/mL Susceptible *     Ampicillin   8 ug/mL Susceptible     Ampicillin/ Sulbactam   4 ug/mL Susceptible     Azithromycin <=0.25 ug/mL Susceptible       Cefepime   <=1 ug/mL Susceptible     Cefoxitin   <=4 ug/mL Susceptible *     Ceftazidime   <=1 ug/mL Susceptible     Ceftriaxone   <=1 ug/mL Susceptible     Ceftriaxone (meningitis) <=0.25 ug/mL Susceptible       Ceftriaxone (non-meningitis) <=0.25 ug/mL Susceptible       Ciprofloxacin   <=0.25 ug/mL Susceptible     Clindamycin <=0.06 ug/mL Susceptible       Extended Spectrum Beta-Lactamase   Negative ug/mL ESBL Negative *     Gentamicin   <=1 ug/mL Susceptible     Levofloxacin 1 ug/mL Susceptible <=0.12 ug/mL Susceptible     Meropenem   <=0.25 ug/mL Susceptible     Nitrofurantoin   <=16 ug/mL Susceptible *     Penicillin (meningitis) <=0.03 ug/mL Susceptible       Penicillin (non-meningitis) <=0.03 ug/mL Susceptible       Penicillin(oral) <=0.03 ug/mL Susceptible       Piperacillin/Tazobactam   <=4 ug/mL Susceptible     Tobramycin   <=1 ug/mL Susceptible     Trimethoprim/Sulfamethoxazole   <=1/19 ug/mL Susceptible     Vancomycin  0.25 ug/mL Susceptible                  * Suppressed Antibiotic             Specimen Collected: 08/10/24  6:37 PM Last Resulted: 08/13/24  9:28 AM             Contains abnormal data Respiratory Aerobic Bacterial Culture  Order: [id number]  Collected 8/11/2024  9:47 AM       Status: Final result       Visible to patient: Yes (not seen)    Specimen Information: Lung, Upper Lobe, Right; Bronchial Alveolar Lavage   0 Result Notes  Culture 2+ Normal anna marie      1+ Escherichia coli Abnormal    Susceptibilities done on previous cultures        Resulting Agency: [facility]           Specimen Collected: 08/11/24  9:47 AM Last Resulted: 08/13/24 11:20 AM        Pertinent Radiology  personally reviewed.   Order  CT Chest w/o Contrast [[id number]] (Order [id number])  Exam Information    Exam Date Exam Time Exam Date Exam Time Accession # Performing Department Results    8/13/24  8:20 PM 8/13/24  8:20 PM [id number] [facility] CT      PACS Images     Show images for CT Chest w/o Contrast     Study Result    Narrative & Impression   EXAM: CT CHEST W/O CONTRAST  LOCATION: [facility]  DATE: 8/13/2024     INDICATION: empyema s p chest tube, ?evacuated  COMPARISON: 8/10/2024  TECHNIQUE: CT chest without IV contrast. Multiplanar reformats were obtained. Dose reduction techniques were used.  CONTRAST: None.     FINDINGS:   LUNGS AND PLEURA: Right basilar pigtail pleural drain in place. Large right pleural effusion with complete atelectasis of the right lung. A few foci of gas in the pleural space. Small left pleural effusion. Emphysema. Mild frothy secretions in trachea.   Debris occludes the central right lung airways.     MEDIASTINUM/AXILLAE: Stable left subclavian approach pacemaker and TAVR. Right PICC with tip near the cavoatrial junction. Rightward mediastinal shift. No lymphadenopathy.     CORONARY ARTERY CALCIFICATION: Moderate.     UPPER ABDOMEN: Unchanged thickening of  the left adrenal gland.     MUSCULOSKELETAL: Unremarkable                                                                      IMPRESSION:   1.  Large right pleural effusion with right lung collapse.

## 2024-08-16 NOTE — PROGRESS NOTES
[facility] MEDICINE PROGRESS NOTE      Summary:74 year old female into [facility] on 8/10/2024 for progressive dyspnea.  PMHx includes hospital admission 7/12/2024-  7/17/2024 for covid, right pleural effusion requiring thoracentesis  Resulting in PTX; no CT required at that time; discharged on 3L  NC    PMHx includes severe COPD, oxygen dependent, tobacco dependence,   Covid with recurrence, right PTX,  DM2 with neuropathy, GERD,   Atrial flutter, TAVR, PPM      Pulmonary history:    COPD, severe with prn home oxygen  COVID, recurrent, negative swab 8/11/2024  Pleural effusion, right: post thoracentesis 7/15/2024 resulting   In small PTX not requiring CT; discharged home on 3 L   Readmission with recurrent right effusion, failing CT,   Lytics; bronchoscopy on 8/11 showed candida albicans   On culture along with e coli; pleural fluid from 8/10 positive   For strep pneumonia;       8/16/2024:  stable overnight; complains of pain at the CT site;   On 5 liters NC; VATS was considered though now    GS recommending transfer to [facility] for higher level   Of care    Per my discussion with the [facility] transfer center and a Dr Culp  Of their CT surgery service decortication is not recommended  At this time due to her underlying fragility.  They recommend a   Larger sized CT as indicated for empyema.  GS are aware of the  New change in transfer status and recommendations.     Hospital day number 6     Empyema/ Pleural effusion, right with recurrence despite chest tube, intrapleural Lytics with tension physiology; VATS not recommended        At this time    2.  Atrial flutter, new:  digoxin, metoprolol, cardizem; PPM, stable  3. 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  HfpEF, CAD, HTN: all stable; continue with diltiazem,   Metoprolol,   5.  Aortic stenosis, history of TAVR 2/2024  6.  PPM in place due to sinus node dysfunction  7.  Long QT, amiodarone stopped;  last EKG 8/12 QTc of 566  8.  COPD: on prednisone 40 mg daily  9. Acute hypoxic respiratory failure due to recurrent pleural effusion,   Empyema  10.  DM2, noninsulin:  11.  Tobacco dependence:   12.  Dvt prevention: eliquis though on hold      Angela Kaiser MD  [facility] Medicine  [facility]  Phone: #[phone]  Securely message with the Vocera Web Console (learn more here)  Text page via [employer] Paging/Directory     Interval History/Subjective:  stable overnight; pt and   Family refusing VATS here       Physical Exam/Objective:  Temp:  [97.6  F (36.4  C)-98.8  F (37.1  C)] 97.8  F (36.6  C)  Pulse:  [78-81] 78  Resp:  [18-22] 18  BP: ()/(53-61) 98/57  SpO2:  [86 %-94 %] 91 %  Body mass index is 26.48 kg/m .    GENERAL:  Alert, appears comfortable, in no acute distress, appears stated age   HEAD:  Normocephalic, without obvious abnormality, atraumatic   EYES:  PERRL, conjunctiva/corneas clear, no scleral icterus, EOM's intact   NOSE: Nares normal, septum midline, mucosa normal, no drainage   THROAT: Lips, mucosa, and tongue normal; teeth and gums normal, mouth moist   NECK: Supple, symmetrical, trachea midline   BACK:   Symmetric, no curvature, ROM normal   LUNGS:   Clear to auscultation bilaterally, no rales, rhonchi, or wheezing, symmetric chest rise on inhalation, respirations unlabored   CHEST WALL:  No tenderness or deformity   HEART:  Regular rate and rhythm, S1 and S2 normal, no murmur, rub, or gallop    ABDOMEN:   Soft, non-tender, bowel sounds active all four quadrants, no masses, no organomegaly, no rebound or guarding   EXTREMITIES: Extremities normal, atraumatic, no cyanosis or edema    SKIN: Dry to touch, no exanthems in the visualized areas   NEURO: Alert, oriented x3, moves all four extremities freely   PSYCH: Cooperative, behavior is appropriate      Data reviewed today: I personally reviewed all new medications, labs, imaging/diagnostics reports over  the past 24 hours. Pertinent findings include:    Imaging:   Recent Results (from the past 24 hour(s))   XR Chest 1 View    Narrative    EXAM: XR CHEST 1 VIEW  LOCATION: [facility]  DATE: 8/15/2024    INDICATION: Treated for empyema, s p chest tube , follow up pleural effusion  COMPARISON: CT chest 8/13/2024, chest radiograph 8/11/2024      Impression    IMPRESSION: Unchanged position of the pigtail chest tube in the right lung base with similar complete opacification of the right hemithorax. Trace left effusion with dependent atelectasis. Background emphysema. Cardiac silhouette and mediastinal contours   are mostly obscured. Aortic valve replacement. Left chest cardiac generator and leads are unchanged. Right upper extremity PICC tip terminates in the mid SVC.       Labs:      Medications:   Personally Reviewed.  Medications   Current Facility-Administered Medications   Medication Dose Route Frequency Provider Last Rate Last Admin     Current Facility-Administered Medications   Medication Dose Route Frequency Provider Last Rate Last Admin    alteplase (ACTIVASE) 10 mg, dornase jacki (PULMOZYME) 5 mg in sodium chloride 0.9 % 50 mL for chest tube instillation in syringe  50 mL Chest Tube BID Carmelita Buckley MD   50 mL at 08/15/24 2152    [Held by provider] apixaban ANTICOAGULANT (ELIQUIS) tablet 5 mg  5 mg Oral BID Carmelita Buckley MD   5 mg at 08/14/24 2057    artificial saliva (BIOTENE MT) solution 1 spray  1 spray Mouth/Throat 4x Daily Carmelita Buckley MD   1 spray at 08/13/24 0815    atorvastatin (LIPITOR) tablet 20 mg  20 mg Oral At Bedtime Carmelita Buckley MD   20 mg at 08/15/24 2121    cefTRIAXone (ROCEPHIN) 2 g vial to attach to  ml bag for ADULTS or NS 50 ml bag for PEDS  2 g Intravenous Q24H Carmelita Buckley MD   2 g at 08/15/24 1705    digoxin (LANOXIN) tablet 125 mcg  125 mcg Oral Daily Carmelita Buckley MD   125 mcg at 08/15/24 0955    diltiazem ER COATED BEADS (CARDIZEM CD/CARTIA XT) 24 hr  capsule 120 mg  120 mg Oral Daily Carmelita Buckley MD   120 mg at 08/15/24 1132    fluticasone-vilanterol (BREO ELLIPTA) 200-25 MCG/ACT inhaler 1 puff  1 puff Inhalation Daily Carmelita Buckley MD   1 puff at 08/14/24 0811    [Held by provider] furosemide (LASIX) injection 20 mg  20 mg Intravenous Q8H Rayo Mendoza MD   20 mg at 08/10/24 1345    gabapentin (NEURONTIN) tablet 600 mg  600 mg Oral TID Carmelita Buckley MD   600 mg at 08/15/24 2122    insulin aspart (NovoLOG) injection (RAPID ACTING)  1-10 Units Subcutaneous TID AC Rayo Mendoza MD   2 Units at 08/15/24 1706    insulin aspart (NovoLOG) injection (RAPID ACTING)  1-7 Units Subcutaneous At Bedtime Rayo Mendoza MD   1 Units at 08/15/24 2122    metoprolol tartrate (LOPRESSOR) tablet 50 mg  50 mg Oral BID Carmelita Buckley MD   50 mg at 08/15/24 2122    pantoprazole (PROTONIX) EC tablet 40 mg  40 mg Oral QAM AC Carmelita Buckley MD   40 mg at 08/14/24 0641    predniSONE (DELTASONE) tablet 40 mg  40 mg Oral Daily Carmelita Buckley MD   40 mg at 08/14/24 0810    sennosides (SENOKOT) tablet 1 tablet  1 tablet Oral BID Carmelita Buckley MD   1 tablet at 08/15/24 2122    sodium chloride (PF) 0.9% PF flush 10-40 mL  10-40 mL Intracatheter Q7 Days Carmelita Buckley MD        sodium chloride (PF) 0.9% PF flush 3 mL  3 mL Intracatheter Q8H Carmelita Buckley MD   3 mL at 08/15/24 1712    sucralfate (CARAFATE) tablet 1 g  1 g Oral TID AC Carmelita Buckley MD   1 g at 08/15/24 1706

## 2024-08-16 NOTE — PROGRESS NOTES
2155: TPA instilled and dwelling in CT.  CT clamped.  See EMAR    2300: CT unclamped and placed back on continuous suction

## 2024-08-16 NOTE — PROGRESS NOTES
Physical Therapy: Orders received. Chart reviewed and discussed with care team.? Physical Therapy not indicated due to pt refused to work with PT three days in a row. Pt has limited activity tolerance.? Defer discharge recommendations to nursing and care team.? Will complete orders. Please put in new PT orders once pt is willing to participate in OOB activities.  Antonia Colmenares, PT, DPT

## 2024-08-16 NOTE — PLAN OF CARE
Problem: Gas Exchange Impaired  Goal: Optimal Gas Exchange  8/16/2024 0337 by London Perdomo, RN  Outcome: Progressing    Problem: Fall Injury Risk  Goal: Absence of Fall and Fall-Related Injury  8/16/2024 0337 by London Perdomo, RN  Outcome: Progressing    Pt sleeping majority of shift. Alteplase administered per order w/ charge RN. R chest tube to continuous suction, minimal output noted. Pt was supposed to have VATS procedure done this morning. After discussion between pt & surgeon pt will not have procedure done at this time. On 4-5L NC, denies SOB. Purewick in place, refusing repositioning. Tele reading A flutter.

## 2024-08-17 ENCOUNTER — HOSPITAL ENCOUNTER (INPATIENT)
Facility: HOSPITAL | Age: 74
LOS: 22 days | Discharge: HOSPICE | DRG: 166 | End: 2024-09-09
Attending: FAMILY MEDICINE | Admitting: SPECIALIST
Payer: COMMERCIAL

## 2024-08-17 ENCOUNTER — ANCILLARY PROCEDURE (OUTPATIENT)
Dept: ULTRASOUND IMAGING | Facility: CLINIC | Age: 74
DRG: 871 | End: 2024-08-17
Attending: INTERNAL MEDICINE
Payer: COMMERCIAL

## 2024-08-17 ENCOUNTER — APPOINTMENT (OUTPATIENT)
Dept: RADIOLOGY | Facility: CLINIC | Age: 74
DRG: 871 | End: 2024-08-17
Payer: COMMERCIAL

## 2024-08-17 VITALS
HEIGHT: 65 IN | BODY MASS INDEX: 26.51 KG/M2 | HEART RATE: 81 BPM | RESPIRATION RATE: 20 BRPM | OXYGEN SATURATION: 94 % | DIASTOLIC BLOOD PRESSURE: 59 MMHG | WEIGHT: 159.1 LBS | SYSTOLIC BLOOD PRESSURE: 106 MMHG | TEMPERATURE: 98.1 F

## 2024-08-17 DIAGNOSIS — Z91.89 AT RISK FOR IMPAIRED SKIN INTEGRITY: ICD-10-CM

## 2024-08-17 DIAGNOSIS — J98.11 ATELECTASIS: ICD-10-CM

## 2024-08-17 DIAGNOSIS — Z51.5 END OF LIFE CARE: ICD-10-CM

## 2024-08-17 DIAGNOSIS — I48.91 ATRIAL FIBRILLATION WITH RAPID VENTRICULAR RESPONSE (H): ICD-10-CM

## 2024-08-17 DIAGNOSIS — J86.9 EMPYEMA (H): Primary | ICD-10-CM

## 2024-08-17 LAB
ANION GAP SERPL CALCULATED.3IONS-SCNC: 6 MMOL/L (ref 7–15)
BASE EXCESS BLDV CALC-SCNC: 10.9 MMOL/L (ref -3–3)
BUN SERPL-MCNC: 16.7 MG/DL (ref 8–23)
CALCIUM SERPL-MCNC: 8.9 MG/DL (ref 8.8–10.4)
CHLORIDE SERPL-SCNC: 105 MMOL/L (ref 98–107)
CREAT SERPL-MCNC: 0.52 MG/DL (ref 0.51–0.95)
EGFRCR SERPLBLD CKD-EPI 2021: >90 ML/MIN/1.73M2
GLUCOSE BLDC GLUCOMTR-MCNC: 114 MG/DL (ref 70–99)
GLUCOSE BLDC GLUCOMTR-MCNC: 210 MG/DL (ref 70–99)
GLUCOSE BLDC GLUCOMTR-MCNC: 228 MG/DL (ref 70–99)
GLUCOSE BLDC GLUCOMTR-MCNC: 239 MG/DL (ref 70–99)
GLUCOSE SERPL-MCNC: 126 MG/DL (ref 70–99)
HCO3 BLDV-SCNC: 36 MMOL/L (ref 21–28)
HCO3 SERPL-SCNC: 32 MMOL/L (ref 22–29)
HGB BLD-MCNC: 9.8 G/DL (ref 11.7–15.7)
MAGNESIUM SERPL-MCNC: 2.2 MG/DL (ref 1.7–2.3)
O2/TOTAL GAS SETTING VFR VENT: 32 %
OXYHGB MFR BLDV: 63 % (ref 70–75)
PCO2 BLDV: 56 MM HG (ref 40–50)
PH BLDV: 7.41 [PH] (ref 7.32–7.43)
PO2 BLDV: 37 MM HG (ref 25–47)
POTASSIUM SERPL-SCNC: 4.3 MMOL/L (ref 3.4–5.3)
SAO2 % BLDV: 64.4 % (ref 70–75)
SODIUM SERPL-SCNC: 143 MMOL/L (ref 135–145)

## 2024-08-17 PROCEDURE — 99239 HOSP IP/OBS DSCHRG MGMT >30: CPT | Performed by: EMERGENCY MEDICINE

## 2024-08-17 PROCEDURE — 250N000013 HC RX MED GY IP 250 OP 250 PS 637: Performed by: INTERNAL MEDICINE

## 2024-08-17 PROCEDURE — 82805 BLOOD GASES W/O2 SATURATION: CPT

## 2024-08-17 PROCEDURE — 250N000012 HC RX MED GY IP 250 OP 636 PS 637: Performed by: INTERNAL MEDICINE

## 2024-08-17 PROCEDURE — 82962 GLUCOSE BLOOD TEST: CPT

## 2024-08-17 PROCEDURE — 85018 HEMOGLOBIN: CPT | Performed by: EMERGENCY MEDICINE

## 2024-08-17 PROCEDURE — 250N000011 HC RX IP 250 OP 636: Performed by: STUDENT IN AN ORGANIZED HEALTH CARE EDUCATION/TRAINING PROGRAM

## 2024-08-17 PROCEDURE — 76604 US EXAM CHEST: CPT | Performed by: INTERNAL MEDICINE

## 2024-08-17 PROCEDURE — 71045 X-RAY EXAM CHEST 1 VIEW: CPT

## 2024-08-17 PROCEDURE — 99233 SBSQ HOSP IP/OBS HIGH 50: CPT | Performed by: FAMILY MEDICINE

## 2024-08-17 PROCEDURE — 83735 ASSAY OF MAGNESIUM: CPT | Performed by: STUDENT IN AN ORGANIZED HEALTH CARE EDUCATION/TRAINING PROGRAM

## 2024-08-17 PROCEDURE — 999N000157 HC STATISTIC RCP TIME EA 10 MIN

## 2024-08-17 PROCEDURE — 250N000011 HC RX IP 250 OP 636: Performed by: EMERGENCY MEDICINE

## 2024-08-17 PROCEDURE — 250N000013 HC RX MED GY IP 250 OP 250 PS 637: Performed by: EMERGENCY MEDICINE

## 2024-08-17 PROCEDURE — 99232 SBSQ HOSP IP/OBS MODERATE 35: CPT | Performed by: SURGERY

## 2024-08-17 PROCEDURE — 250N000011 HC RX IP 250 OP 636: Performed by: INTERNAL MEDICINE

## 2024-08-17 PROCEDURE — 99233 SBSQ HOSP IP/OBS HIGH 50: CPT | Mod: 25 | Performed by: INTERNAL MEDICINE

## 2024-08-17 PROCEDURE — 80048 BASIC METABOLIC PNL TOTAL CA: CPT | Performed by: EMERGENCY MEDICINE

## 2024-08-17 RX ORDER — CALCIUM CARBONATE 500 MG/1
1000 TABLET, CHEWABLE ORAL 4 TIMES DAILY PRN
Status: DISCONTINUED | OUTPATIENT
Start: 2024-08-17 | End: 2024-09-09 | Stop reason: HOSPADM

## 2024-08-17 RX ORDER — IPRATROPIUM BROMIDE AND ALBUTEROL SULFATE 2.5; .5 MG/3ML; MG/3ML
3 SOLUTION RESPIRATORY (INHALATION) EVERY 4 HOURS PRN
Status: DISCONTINUED | OUTPATIENT
Start: 2024-08-17 | End: 2024-09-09 | Stop reason: HOSPADM

## 2024-08-17 RX ORDER — SUCRALFATE 1 G/1
1 TABLET ORAL
Status: CANCELLED | OUTPATIENT
Start: 2024-08-17

## 2024-08-17 RX ORDER — NALOXONE HYDROCHLORIDE 0.4 MG/ML
0.4 INJECTION, SOLUTION INTRAMUSCULAR; INTRAVENOUS; SUBCUTANEOUS
Status: CANCELLED | OUTPATIENT
Start: 2024-08-17

## 2024-08-17 RX ORDER — SENNOSIDES 8.6 MG
1 TABLET ORAL 2 TIMES DAILY
Status: CANCELLED | OUTPATIENT
Start: 2024-08-17

## 2024-08-17 RX ORDER — PIPERACILLIN SODIUM, TAZOBACTAM SODIUM 3; .375 G/15ML; G/15ML
3.38 INJECTION, POWDER, LYOPHILIZED, FOR SOLUTION INTRAVENOUS EVERY 8 HOURS
Status: CANCELLED | OUTPATIENT
Start: 2024-08-17

## 2024-08-17 RX ORDER — DEXTROSE MONOHYDRATE 25 G/50ML
25-50 INJECTION, SOLUTION INTRAVENOUS
Status: DISCONTINUED | OUTPATIENT
Start: 2024-08-17 | End: 2024-09-09 | Stop reason: HOSPADM

## 2024-08-17 RX ORDER — SALIVA STIMULANT COMB. NO.3
1 SPRAY, NON-AEROSOL (ML) MUCOUS MEMBRANE 4 TIMES DAILY
Status: CANCELLED | OUTPATIENT
Start: 2024-08-17

## 2024-08-17 RX ORDER — POLYETHYLENE GLYCOL 3350 17 G/17G
17 POWDER, FOR SOLUTION ORAL DAILY PRN
Status: DISCONTINUED | OUTPATIENT
Start: 2024-08-17 | End: 2024-09-09 | Stop reason: HOSPADM

## 2024-08-17 RX ORDER — GABAPENTIN 300 MG/1
600 CAPSULE ORAL 3 TIMES DAILY
Status: DISCONTINUED | OUTPATIENT
Start: 2024-08-17 | End: 2024-09-05

## 2024-08-17 RX ORDER — NALOXONE HYDROCHLORIDE 0.4 MG/ML
0.2 INJECTION, SOLUTION INTRAMUSCULAR; INTRAVENOUS; SUBCUTANEOUS
Status: DISCONTINUED | OUTPATIENT
Start: 2024-08-17 | End: 2024-09-09 | Stop reason: HOSPADM

## 2024-08-17 RX ORDER — SUCRALFATE 1 G/1
1 TABLET ORAL
Status: DISCONTINUED | OUTPATIENT
Start: 2024-08-17 | End: 2024-09-09 | Stop reason: HOSPADM

## 2024-08-17 RX ORDER — AMOXICILLIN 250 MG
1 CAPSULE ORAL 2 TIMES DAILY PRN
Status: DISCONTINUED | OUTPATIENT
Start: 2024-08-17 | End: 2024-09-09 | Stop reason: HOSPADM

## 2024-08-17 RX ORDER — AMOXICILLIN 250 MG
2 CAPSULE ORAL 2 TIMES DAILY PRN
Status: CANCELLED | OUTPATIENT
Start: 2024-08-17

## 2024-08-17 RX ORDER — DIGOXIN 125 MCG
125 TABLET ORAL DAILY
Status: DISCONTINUED | OUTPATIENT
Start: 2024-08-18 | End: 2024-09-09 | Stop reason: HOSPADM

## 2024-08-17 RX ORDER — FUROSEMIDE 10 MG/ML
20 INJECTION INTRAMUSCULAR; INTRAVENOUS EVERY 8 HOURS
Status: CANCELLED | OUTPATIENT
Start: 2024-08-17

## 2024-08-17 RX ORDER — NICOTINE POLACRILEX 4 MG
15-30 LOZENGE BUCCAL
Status: DISCONTINUED | OUTPATIENT
Start: 2024-08-17 | End: 2024-09-09 | Stop reason: HOSPADM

## 2024-08-17 RX ORDER — FLUCONAZOLE 2 MG/ML
200 INJECTION, SOLUTION INTRAVENOUS EVERY 24 HOURS
Status: DISCONTINUED | OUTPATIENT
Start: 2024-08-17 | End: 2024-08-29

## 2024-08-17 RX ORDER — NALOXONE HYDROCHLORIDE 0.4 MG/ML
0.4 INJECTION, SOLUTION INTRAMUSCULAR; INTRAVENOUS; SUBCUTANEOUS
Status: DISCONTINUED | OUTPATIENT
Start: 2024-08-17 | End: 2024-09-09 | Stop reason: HOSPADM

## 2024-08-17 RX ORDER — POLYETHYLENE GLYCOL 3350 17 G/17G
17 POWDER, FOR SOLUTION ORAL DAILY PRN
Status: CANCELLED | OUTPATIENT
Start: 2024-08-17

## 2024-08-17 RX ORDER — PROCHLORPERAZINE MALEATE 5 MG
5 TABLET ORAL EVERY 6 HOURS PRN
Status: CANCELLED | OUTPATIENT
Start: 2024-08-17

## 2024-08-17 RX ORDER — METOPROLOL TARTRATE 50 MG
50 TABLET ORAL 2 TIMES DAILY
Status: CANCELLED | OUTPATIENT
Start: 2024-08-17

## 2024-08-17 RX ORDER — FLUCONAZOLE 2 MG/ML
200 INJECTION, SOLUTION INTRAVENOUS EVERY 24 HOURS
Status: CANCELLED | OUTPATIENT
Start: 2024-08-17

## 2024-08-17 RX ORDER — DIGOXIN 125 MCG
125 TABLET ORAL DAILY
Status: CANCELLED | OUTPATIENT
Start: 2024-08-18

## 2024-08-17 RX ORDER — LIDOCAINE 40 MG/G
CREAM TOPICAL
Status: DISCONTINUED | OUTPATIENT
Start: 2024-08-17 | End: 2024-09-09 | Stop reason: HOSPADM

## 2024-08-17 RX ORDER — ATORVASTATIN CALCIUM 10 MG/1
20 TABLET, FILM COATED ORAL AT BEDTIME
Status: CANCELLED | OUTPATIENT
Start: 2024-08-17

## 2024-08-17 RX ORDER — DILTIAZEM HYDROCHLORIDE 120 MG/1
120 CAPSULE, COATED, EXTENDED RELEASE ORAL DAILY
Status: DISCONTINUED | OUTPATIENT
Start: 2024-08-18 | End: 2024-09-08

## 2024-08-17 RX ORDER — ACETAMINOPHEN 650 MG/1
650 SUPPOSITORY RECTAL EVERY 4 HOURS PRN
Status: DISCONTINUED | OUTPATIENT
Start: 2024-08-17 | End: 2024-08-25

## 2024-08-17 RX ORDER — DILTIAZEM HYDROCHLORIDE 120 MG/1
120 CAPSULE, COATED, EXTENDED RELEASE ORAL DAILY
Status: CANCELLED | OUTPATIENT
Start: 2024-08-17

## 2024-08-17 RX ORDER — AMOXICILLIN 250 MG
1 CAPSULE ORAL 2 TIMES DAILY PRN
Status: CANCELLED | OUTPATIENT
Start: 2024-08-17

## 2024-08-17 RX ORDER — PROCHLORPERAZINE MALEATE 5 MG
5 TABLET ORAL EVERY 6 HOURS PRN
Status: DISCONTINUED | OUTPATIENT
Start: 2024-08-17 | End: 2024-09-09 | Stop reason: HOSPADM

## 2024-08-17 RX ORDER — ACETAMINOPHEN 325 MG/1
650 TABLET ORAL EVERY 4 HOURS PRN
Status: CANCELLED | OUTPATIENT
Start: 2024-08-17

## 2024-08-17 RX ORDER — PROCHLORPERAZINE 25 MG
12.5 SUPPOSITORY, RECTAL RECTAL EVERY 12 HOURS PRN
Status: DISCONTINUED | OUTPATIENT
Start: 2024-08-17 | End: 2024-09-09 | Stop reason: HOSPADM

## 2024-08-17 RX ORDER — PROCHLORPERAZINE 25 MG
12.5 SUPPOSITORY, RECTAL RECTAL EVERY 12 HOURS PRN
Status: CANCELLED | OUTPATIENT
Start: 2024-08-17

## 2024-08-17 RX ORDER — ATORVASTATIN CALCIUM 10 MG/1
20 TABLET, FILM COATED ORAL AT BEDTIME
Status: DISCONTINUED | OUTPATIENT
Start: 2024-08-17 | End: 2024-09-09 | Stop reason: HOSPADM

## 2024-08-17 RX ORDER — LIDOCAINE 40 MG/G
CREAM TOPICAL
Status: CANCELLED | OUTPATIENT
Start: 2024-08-17

## 2024-08-17 RX ORDER — SALIVA STIMULANT COMB. NO.3
1 SPRAY, NON-AEROSOL (ML) MUCOUS MEMBRANE 4 TIMES DAILY
Status: DISCONTINUED | OUTPATIENT
Start: 2024-08-17 | End: 2024-09-09 | Stop reason: HOSPADM

## 2024-08-17 RX ORDER — AMOXICILLIN 250 MG
2 CAPSULE ORAL 2 TIMES DAILY PRN
Status: DISCONTINUED | OUTPATIENT
Start: 2024-08-17 | End: 2024-09-09 | Stop reason: HOSPADM

## 2024-08-17 RX ORDER — FLUTICASONE FUROATE AND VILANTEROL 200; 25 UG/1; UG/1
1 POWDER RESPIRATORY (INHALATION) DAILY
Status: DISCONTINUED | OUTPATIENT
Start: 2024-08-18 | End: 2024-09-09 | Stop reason: HOSPADM

## 2024-08-17 RX ORDER — ACETAMINOPHEN 325 MG/1
650 TABLET ORAL EVERY 4 HOURS PRN
Status: DISCONTINUED | OUTPATIENT
Start: 2024-08-17 | End: 2024-08-25

## 2024-08-17 RX ORDER — ALBUTEROL SULFATE 90 UG/1
2 AEROSOL, METERED RESPIRATORY (INHALATION) EVERY 4 HOURS PRN
Status: CANCELLED | OUTPATIENT
Start: 2024-08-17

## 2024-08-17 RX ORDER — DEXTROSE MONOHYDRATE 25 G/50ML
25-50 INJECTION, SOLUTION INTRAVENOUS
Status: CANCELLED | OUTPATIENT
Start: 2024-08-17

## 2024-08-17 RX ORDER — IPRATROPIUM BROMIDE AND ALBUTEROL SULFATE 2.5; .5 MG/3ML; MG/3ML
3 SOLUTION RESPIRATORY (INHALATION) EVERY 4 HOURS PRN
Status: CANCELLED | OUTPATIENT
Start: 2024-08-17

## 2024-08-17 RX ORDER — FLUTICASONE FUROATE AND VILANTEROL 200; 25 UG/1; UG/1
1 POWDER RESPIRATORY (INHALATION) DAILY
Status: CANCELLED | OUTPATIENT
Start: 2024-08-17

## 2024-08-17 RX ORDER — FUROSEMIDE 10 MG/ML
20 INJECTION INTRAMUSCULAR; INTRAVENOUS EVERY 8 HOURS
Status: DISCONTINUED | OUTPATIENT
Start: 2024-08-17 | End: 2024-08-21

## 2024-08-17 RX ORDER — PIPERACILLIN SODIUM, TAZOBACTAM SODIUM 3; .375 G/15ML; G/15ML
3.38 INJECTION, POWDER, LYOPHILIZED, FOR SOLUTION INTRAVENOUS EVERY 8 HOURS
Status: DISCONTINUED | OUTPATIENT
Start: 2024-08-17 | End: 2024-08-19

## 2024-08-17 RX ORDER — ALBUTEROL SULFATE 90 UG/1
2 AEROSOL, METERED RESPIRATORY (INHALATION) EVERY 4 HOURS PRN
Status: DISCONTINUED | OUTPATIENT
Start: 2024-08-17 | End: 2024-09-09 | Stop reason: HOSPADM

## 2024-08-17 RX ORDER — PANTOPRAZOLE SODIUM 40 MG/1
40 TABLET, DELAYED RELEASE ORAL
Status: CANCELLED | OUTPATIENT
Start: 2024-08-18

## 2024-08-17 RX ORDER — SENNOSIDES 8.6 MG
1 TABLET ORAL 2 TIMES DAILY
Status: DISCONTINUED | OUTPATIENT
Start: 2024-08-17 | End: 2024-08-19 | Stop reason: ALTCHOICE

## 2024-08-17 RX ORDER — NICOTINE POLACRILEX 4 MG
15-30 LOZENGE BUCCAL
Status: CANCELLED | OUTPATIENT
Start: 2024-08-17

## 2024-08-17 RX ORDER — NALOXONE HYDROCHLORIDE 0.4 MG/ML
0.2 INJECTION, SOLUTION INTRAMUSCULAR; INTRAVENOUS; SUBCUTANEOUS
Status: CANCELLED | OUTPATIENT
Start: 2024-08-17

## 2024-08-17 RX ORDER — CALCIUM CARBONATE 500 MG/1
1000 TABLET, CHEWABLE ORAL 4 TIMES DAILY PRN
Status: CANCELLED | OUTPATIENT
Start: 2024-08-17

## 2024-08-17 RX ORDER — ACETAMINOPHEN 650 MG/1
650 SUPPOSITORY RECTAL EVERY 4 HOURS PRN
Status: CANCELLED | OUTPATIENT
Start: 2024-08-17

## 2024-08-17 RX ORDER — PANTOPRAZOLE SODIUM 40 MG/1
40 TABLET, DELAYED RELEASE ORAL
Status: DISCONTINUED | OUTPATIENT
Start: 2024-08-18 | End: 2024-09-09 | Stop reason: HOSPADM

## 2024-08-17 RX ORDER — GABAPENTIN 600 MG/1
600 TABLET ORAL 3 TIMES DAILY
Status: CANCELLED | OUTPATIENT
Start: 2024-08-17

## 2024-08-17 RX ORDER — METOPROLOL TARTRATE 25 MG/1
50 TABLET, FILM COATED ORAL 2 TIMES DAILY
Status: DISCONTINUED | OUTPATIENT
Start: 2024-08-17 | End: 2024-09-03

## 2024-08-17 RX ADMIN — SUCRALFATE 1 G: 1 TABLET ORAL at 12:23

## 2024-08-17 RX ADMIN — Medication 1 SPRAY: at 08:31

## 2024-08-17 RX ADMIN — GABAPENTIN 600 MG: 600 TABLET, FILM COATED ORAL at 08:29

## 2024-08-17 RX ADMIN — DILTIAZEM HYDROCHLORIDE 120 MG: 120 CAPSULE, COATED, EXTENDED RELEASE ORAL at 08:29

## 2024-08-17 RX ADMIN — PIPERACILLIN AND TAZOBACTAM 3.38 G: 3; .375 INJECTION, POWDER, FOR SOLUTION INTRAVENOUS at 23:35

## 2024-08-17 RX ADMIN — ACETAMINOPHEN 650 MG: 325 TABLET ORAL at 12:23

## 2024-08-17 RX ADMIN — FLUTICASONE FUROATE AND VILANTEROL TRIFENATATE 1 PUFF: 200; 25 POWDER RESPIRATORY (INHALATION) at 08:32

## 2024-08-17 RX ADMIN — PIPERACILLIN AND TAZOBACTAM 3.38 G: 3; .375 INJECTION, POWDER, FOR SOLUTION INTRAVENOUS at 08:22

## 2024-08-17 RX ADMIN — PREDNISONE 30 MG: 20 TABLET ORAL at 08:30

## 2024-08-17 RX ADMIN — PROCHLORPERAZINE EDISYLATE 5 MG: 5 INJECTION INTRAMUSCULAR; INTRAVENOUS at 13:09

## 2024-08-17 RX ADMIN — ATORVASTATIN CALCIUM 20 MG: 10 TABLET, FILM COATED ORAL at 20:14

## 2024-08-17 RX ADMIN — Medication 1 SPRAY: at 20:14

## 2024-08-17 RX ADMIN — SUCRALFATE 1 G: 1 TABLET ORAL at 08:30

## 2024-08-17 RX ADMIN — GABAPENTIN 600 MG: 300 CAPSULE ORAL at 17:03

## 2024-08-17 RX ADMIN — FLUCONAZOLE 200 MG: 2 INJECTION, SOLUTION INTRAVENOUS at 19:30

## 2024-08-17 RX ADMIN — PIPERACILLIN AND TAZOBACTAM 3.38 G: 3; .375 INJECTION, POWDER, FOR SOLUTION INTRAVENOUS at 16:51

## 2024-08-17 RX ADMIN — SUCRALFATE 1 G: 1 TABLET ORAL at 17:04

## 2024-08-17 RX ADMIN — Medication 1 SPRAY: at 17:02

## 2024-08-17 RX ADMIN — METOPROLOL TARTRATE 50 MG: 50 TABLET, FILM COATED ORAL at 08:30

## 2024-08-17 RX ADMIN — GABAPENTIN 600 MG: 300 CAPSULE ORAL at 20:13

## 2024-08-17 RX ADMIN — INSULIN ASPART 2 UNITS: 100 INJECTION, SOLUTION INTRAVENOUS; SUBCUTANEOUS at 22:02

## 2024-08-17 RX ADMIN — PANTOPRAZOLE SODIUM 40 MG: 40 TABLET, DELAYED RELEASE ORAL at 08:30

## 2024-08-17 RX ADMIN — SENNOSIDES 1 TABLET: 8.6 TABLET, FILM COATED ORAL at 08:29

## 2024-08-17 RX ADMIN — DIGOXIN 125 MCG: 125 TABLET ORAL at 08:30

## 2024-08-17 ASSESSMENT — ACTIVITIES OF DAILY LIVING (ADL)
ADLS_ACUITY_SCORE: 41
ADLS_ACUITY_SCORE: 42
ADLS_ACUITY_SCORE: 42
ADLS_ACUITY_SCORE: 41
ADLS_ACUITY_SCORE: 43
ADLS_ACUITY_SCORE: 41
ADLS_ACUITY_SCORE: 42
ADLS_ACUITY_SCORE: 43
ADLS_ACUITY_SCORE: 41
ADLS_ACUITY_SCORE: 41
ADLS_ACUITY_SCORE: 42
ADLS_ACUITY_SCORE: 38
ADLS_ACUITY_SCORE: 43
ADLS_ACUITY_SCORE: 42
ADLS_ACUITY_SCORE: 42
ADLS_ACUITY_SCORE: 43
ADLS_ACUITY_SCORE: 41
ADLS_ACUITY_SCORE: 41
ADLS_ACUITY_SCORE: 43

## 2024-08-17 NOTE — DISCHARGE SUMMARY
[facility] MEDICINE  DISCHARGE SUMMARY     Primary Care Physician: Cammy Bui  Admission Date: 8/10/2024   Discharge Provider: Angela Kaiser MD Discharge Date: 8/17/2024   Diet:   Active Diet and Nourishment Order   Procedures    Fluid restriction 1800 ML FLUID    Moderate Consistent Carb (60 g CHO per Meal) Diet    Diet       Code Status: Full Code   Activity: bedrest        Condition at Discharge: guarded, stable     REASON FOR PRESENTATION(See Admission Note for Details)     Dyspnea, right pleural effusion    PRINCIPAL & ACTIVE DISCHARGE DIAGNOSES       Acute hypoxic and hypercapnic respiratory failure due to   Copd, right pleural effusion    2.  Atrial flutter, paroxsymal   3.  History of TAVR  4.  Drug induced coagulation defect due to eliquis use  5.  Empyema, right lung  6.  Tension hydrothorax  7.  DM2, insulin dependent  8.  COPD, severe with chronic oxygen use  9.  Sepsis due to empyema, right lung  10.  TAVR, history of (2/2024)  11.  HFpEF  12.  Chronic constipation  13.  Dyslipidemia  14.  Anemia, normocytic  15.  Tobacco history  16.  History of PPM          PENDING LABS     Unresulted Labs Ordered in the Past 30 Days of this Admission       Date and Time Order Name Status Description    8/11/2024 11:02 AM Fungal or Yeast Culture Routine Preliminary     8/11/2024 10:08 AM Nocardia species culture Preliminary     8/11/2024  9:43 AM Actinomyces rule out - BAL Site 2 Preliminary     8/11/2024  9:43 AM Acid-Fast Bacilli Culture and Stain In process     8/11/2024  9:43 AM Actinomyces rule out - BAL Site 1 Preliminary     8/11/2024  9:43 AM Nocardia culture - BAL Site 1 Preliminary     8/11/2024  9:43 AM Fungus Culture, non-blood - BAL Site 1 Preliminary     8/11/2024  9:43 AM Acid-Fast Bacilli Culture and Stain In process     8/10/2024 12:04 PM Acid-Fast Bacilli Culture and Stain In process               PROCEDURES ( this hospitalization only)       Bronchoscopy 8/11/2024      DISPOSITION     Transfer to [facility] for VATS per Dr Paerce    SUMMARY OF HOSPITAL COURSE:      74 year old female into [facility] on 8/10/2024 after presenting to the  ER for progressive dyspnea over days to weeks.  She has a history of   Oxygen dependent COPD with 3 liters home use.  Pt had a hospital stay  7/12-7/17 for right pleural effusion, covid and underlying pneumonia.  Therapeutics during at that time included a thoracentesis that resulted in a small PTX. The fluid was transudative.     PMHx includes atrial flutter, copd, dyslipidemia, DM2 with insulin, neuropathy, severe copd with oxygen dependence, covid with recurrence  And a right pleural effusion with recucrrence.    Diagnostics on arrival showed a large right sided effusion with compression atelectasis.   She had a pigtail CT placed in while in the ER.  She was given clinical support with rocephin.  The fluid returned transudative though did  Result in strep pneumonia positive.      She was admitted for clinical support regarding her recurrent right effusion  Along with respiratory failure.  After admission the CT (14 Fr pigtail) was  Repositioned by the pulmonary service.  (8/11).  She had a diagnostic  Bronchoscopy on 8/11.  Samples returned e coli along with candida.  She has recurring issues with atrial fibrillation/flutter with use of eliquis.  Cardiology initially tried amiodarone though she developed a prolonged QT.  Currently she is on digoxin, cardizem and metoprolol.  She was  Given steroids and now an oral taper for her COPD.  She has had   3 CT chest (8/10, 8/13 and 8/16).  Each imaging study shows progression  Of her right effusion.  Currently she has RUL and RLL collapse  Along with right mainstem bronchus involvement.  She is on 5 liters  Oxygen without labored breathing.  The CT has not had output in  At least 24 hours.        Per discussion with GS and pulmonary a VATS procedure is  recommended.  The team made attempts to transfer to [facility] on 8/16 though their thoracic  Surgery team declined the transfer feeling she was at high risk for mortality  Intraop and postop.  They recommended a larger bore CT.  The ID  Team consulted and made antibiotic recommendations.     Per rediscussion Dr Pearce is recommending the VATS to be done at  [facility].  (Possibly 8/19/2024).    The patient agrees.    Transfer arrangements have been confirmed with Dr Baptiste being the  Accepting hospitalist.  She is stable for transfer to an intermediate  Care bed. The right chest tube will remain on continuous suction.  The  Eliquis is on hold.       Discharge Medications with Med changes:     Current Discharge Medication List        CONTINUE these medications which have NOT CHANGED    Details   albuterol (PROAIR HFA/PROVENTIL HFA/VENTOLIN HFA) 108 (90 Base) MCG/ACT inhaler INHALE 2 PUFFS INTO THE LUNGS EVERY 4 HOURS AS NEEDED FOR WHEEZING  Qty: 13.4 g, Refills: 1    Comments: Pharmacy may dispense brand covered by insurance (Proair, or proventil or ventolin or generic albuterol inhaler)  Associated Diagnoses: Chronic obstructive pulmonary disease with acute lower respiratory infection (H)      apixaban ANTICOAGULANT (ELIQUIS) 5 MG tablet Take 1 tablet (5 mg) by mouth 2 times daily  Qty: 180 tablet, Refills: 2    Comments: Resume evening of 2/23  Associated Diagnoses: Atrial fibrillation, unspecified type (H)      atorvastatin (LIPITOR) 20 MG tablet TAKE 1 TABLET(20 MG) BY MOUTH AT BEDTIME  Qty: 90 tablet, Refills: 2    Associated Diagnoses: Mixed hyperlipidemia      budesonide-formoterol (SYMBICORT) 160-4.5 MCG/ACT Inhaler INHALE 2 PUFFS INTO THE LUNGS TWICE DAILY  Qty: 10.2 g, Refills: 4    Associated Diagnoses: Pulmonary emphysema, unspecified emphysema type (H)      diltiazem ER COATED BEADS (CARDIZEM CD/CARTIA XT) 120 MG 24 hr capsule TAKE 1 CAPSULE(120 MG) BY MOUTH DAILY  Qty: 30 capsule, Refills: 5     Associated Diagnoses: Chronic heart failure with preserved ejection fraction (H)      Emollient (EUCERIN ORIGINAL HEALING) LOTN APPLY LIBERALLY TO DRY SKIN TWICE DAILY AS NEEDED      empagliflozin (JARDIANCE) 10 MG TABS tablet Take 1 tablet (10 mg) by mouth daily  Qty: 90 tablet, Refills: 2    Associated Diagnoses: Acute on chronic diastolic heart failure (H); Type 2 diabetes mellitus with hypoglycemia without coma, without long-term current use of insulin (H)      furosemide (LASIX) 20 MG tablet Take 2 tablets (40 mg) by mouth daily  Qty: 180 tablet, Refills: 1    Comments: Dose increased 4/29  Associated Diagnoses: Acute on chronic diastolic heart failure (H)      gabapentin (NEURONTIN) 600 MG tablet TAKE 1 TABLET(600 MG) BY MOUTH THREE TIMES DAILY  Qty: 270 tablet, Refills: 2    Associated Diagnoses: Type 2 diabetes mellitus with hypoglycemia without coma, with long-term current use of insulin (H)      ipratropium - albuterol 0.5 mg/2.5 mg/3 mL (DUONEB) 0.5-2.5 (3) MG/3ML neb solution INHALE 1 VIAL VIA NEBULIZER EVERY 6 HOURS AS NEEDED FOR SHORTNESS OF BREATH OR WHEEZING  Qty: 90 mL, Refills: 0    Associated Diagnoses: COPD exacerbation (H)      metoprolol tartrate (LOPRESSOR) 50 MG tablet Take 1 tablet (50 mg) by mouth 2 times daily  Qty: 180 tablet, Refills: 1    Associated Diagnoses: Complete atrioventricular block (H)      nystatin (NYSTOP) 940330 UNIT/GM external powder APPLY TO THE AFFECTED AREA(S) 2-3 TIMES DAILY AS NEEDED  Qty: 180 g, Refills: 0    Comments: **Patient requests 90 days supply**  Associated Diagnoses: Candidal intertrigo      omeprazole (PRILOSEC) 40 MG DR capsule Take 40 mg by mouth daily      oxyCODONE IR (ROXICODONE) 10 MG tablet Take 1 tablet (10 mg) by mouth every 6 hours as needed for moderate to severe pain  Qty: 120 tablet, Refills: 0    Associated Diagnoses: Fibromyalgia; Chronic pain syndrome      polyethylene glycol (MIRALAX) 17 GM/Dose powder Take 17 g by mouth daily as needed  "for constipation    Associated Diagnoses: Constipation, unspecified constipation type      potassium chloride john ER (KLOR-CON M20) 20 MEQ CR tablet TAKE 1 TABLET(20 MEQ) BY MOUTH DAILY  Qty: 90 tablet, Refills: 2    Associated Diagnoses: Acute and chronic respiratory failure with hypercapnia (H)      sennosides (SENOKOT) 8.6 MG tablet Take 1 tablet by mouth 2 times daily    Associated Diagnoses: Constipation, unspecified constipation type      sucralfate (CARAFATE) 1 GM tablet CRUSH ONE TABLET AND MIX WITH A LLITTLE WATER AND SWALLOW FOUR TIMES DAILY  Qty: 360 tablet, Refills: 3    Associated Diagnoses: Iron deficiency anemia due to chronic blood loss      ACCU-CHEK SOFTCLIX LANCETS lancets [ACCU-CHEK SOFTCLIX LANCETS LANCETS] TEST THREE TIMES DAILY  Qty: 300 each, Refills: 3    Associated Diagnoses: Type 2 diabetes mellitus with hyperglycemia (H)      BD ULTRA-FINE SHORT PEN NEEDLE 31 gauge x 5/16\" Ndle [BD ULTRA-FINE SHORT PEN NEEDLE 31 GAUGE X 5/16\" NDLE] TEST FOUR TIMES DAILY WITH MEALS AND AT BEDTIME  Qty: 400 each, Refills: 3    Associated Diagnoses: DM (diabetes mellitus) (H)      !! blood glucose (ONETOUCH VERIO IQ) test strip TEST THREE TIMES DAILY  Qty: 300 strip, Refills: 1    Associated Diagnoses: Type 2 diabetes mellitus with hypoglycemia without coma, without long-term current use of insulin (H)      !! blood-glucose meter (ONETOUCH VERIO IQ METER) Misc [BLOOD-GLUCOSE METER (ONETOUCH VERIO IQ METER) MISC] Check blood sugar three times a day.  Qty: 1 each, Refills: 0    Associated Diagnoses: Type 2 diabetes mellitus with hypoglycemia without coma, without long-term current use of insulin (H)      diaper,brief,adult,disposable (ADULT BRIEFS - LARGE) Misc [DIAPER,BRIEF,ADULT,DISPOSABLE (ADULT BRIEFS - LARGE) MISC] Use 3-4 daily as needed for incontinence  Qty: 120 each, Refills: 6    Associated Diagnoses: Mixed stress and urge urinary incontinence      generic lancets (FINGERSTIX LANCETS) [GENERIC " LANCETS (FINGERSTIX LANCETS)] Dispense brand per patient's insurance at pharmacy discretion.  Qty: 300 each, Refills: 0    Comments: Test three times daily.  Associated Diagnoses: Type 2 diabetes mellitus with hyperglycemia, unspecified whether long term insulin use (H)      naloxone (NARCAN) 4 MG/0.1ML nasal spray Spray 1 spray (4 mg) into one nostril alternating nostrils once as needed for opioid reversal every 2-3 minutes until assistance arrives  Qty: 0.2 mL, Refills: 0    Associated Diagnoses: Trigger point of thoracic region; Chronic pain syndrome      Nutritional Supplements (ENSURE COMPLETE) LIQD Take 1 Can by mouth daily  Qty: 7110 mL, Refills: 11    Associated Diagnoses: Severe protein-calorie malnutrition (H24)       !! - Potential duplicate medications found. Please discuss with provider.                    Consults       PHARMACY TO DOSE VANCO  PHARMACY TO DOSE VANCO  SPEECH LANGUAGE PATH ADULT IP CONSULT  INTERVENTIONAL RADIOLOGY ADULT/PEDS IP CONSULT  VASCULAR ACCESS ADULT IP CONSULT  CARE MANAGEMENT / SOCIAL WORK IP CONSULT  PHARMACY TO DOSE VANCO  PULMONARY IP CONSULT  CARDIOLOGY IP CONSULT  PHYSICAL THERAPY ADULT IP CONSULT  OCCUPATIONAL THERAPY ADULT IP CONSULT  SURGERY GENERAL IP CONSULT  INFECTIOUS DISEASES IP CONSULT  INTERVENTIONAL RADIOLOGY ADULT/PEDS IP CONSULT    Immunizations given this encounter     Most Recent Immunizations   Administered Date(s) Administered    COVID-19 Bivalent 12+ (Pfizer) 09/27/2022    COVID-19 MONOVALENT 12+ (Pfizer) 11/22/2021    COVID-19 Monovalent 12+ (Pfizer 2022) 07/25/2022    DT (PEDS <7y) 03/01/2004    Flu, Unspecified 10/22/2008    HepA, Unspecified 08/03/2010    Influenza (High Dose) 3 valent vaccine 01/17/2020    Influenza Vaccine 65+ (Fluzone HD) 10/23/2023    Influenza Vaccine, 6+MO IM (QUADRIVALENT W/PRESERVATIVES) 09/19/2014    Pneumo Conj 13-V (2010&after) 07/22/2015    Pneumococcal 20 valent Conjugate (Prevnar 20) 12/27/2022    Pneumococcal 23 valent  "10/19/2016    TD,PF 7+ (Tenivac) 08/16/2019    TDAP (Adacel,Boostrix) 03/11/2008    Td,adult,historic,unspecified 03/11/2008    Zoster recombinant adjuvanted (SHINGRIX) 08/16/2019    Zoster vaccine, live 07/22/2015           Anticoagulation Information      Recent INR results: No results for input(s): \"INR\" in the last 168 hours.  Warfarin doses (if applicable) or name of other anticoagulant:       SIGNIFICANT IMAGING FINDINGS     Results for orders placed or performed during the hospital encounter of 08/10/24   XR Chest Port 1 View    Impression    IMPRESSION: Large right pleural effusion. No pneumothorax. Left subclavian approach pacing device. Prosthetic aortic valve. Cardiac silhouette within normal limits. No acute osseous abnormality.   XR Chest Port 1 View    Impression    IMPRESSION: No change in dual-lead cardiac pacer or TAVR. Right-sided chest tube has been placed since comparison study with decrease in the previously seen large right pleural effusion. A moderate to large pleural effusion remains. There is likely   overlying compressive atelectasis of the inferior portion of the right lung with concurrent infectious process not excluded. There is some indistinctness of the pulmonary interstitium involving the left lower lobe which is new from prior study and may   reflect airway inflammation or edema.     CT Chest w Contrast    Impression    IMPRESSION:   1.  Large right effusion and extensive compressive atelectasis versus less likely infiltrate in the right lung.  2.  The pigtail catheter tip is at the anterior apex, most of the fluid is at the base.               XR Chest Port 1 View    Impression    IMPRESSION:     New right PICC with tip near the cavoatrial junction.    Otherwise, no significant interval change.   XR Chest 1 View    Impression    IMPRESSION: Interval placement right basilar pleural drainage catheter. Previously seen large right pleural effusion has been nearly completely evacuated " and the mid and lower right lung have partially reexpanded. No pneumothorax. Left lung clear. Mild   cardiac enlargement. Transcatheter aortic valve replacement. Pacer anterior left chest wall with lead tips in the RA and RV. Right PICC tip RA/SVC junction.     XR Video Swallow with SLP or OT    Impression    IMPRESSION:  Penetration with thin liquids, however no aspiration visualized. Please see speech pathology report for further details and recommendations.   US Abdomen Limited    Impression    IMPRESSION:  1.  Normal limited abdominal ultrasound within limitations detailed above.       CT Chest w/o Contrast    Impression    IMPRESSION:   1.  Large right pleural effusion with right lung collapse.     XR Chest 1 View    Impression    IMPRESSION: Unchanged position of the pigtail chest tube in the right lung base with similar complete opacification of the right hemithorax. Trace left effusion with dependent atelectasis. Background emphysema. Cardiac silhouette and mediastinal contours   are mostly obscured. Aortic valve replacement. Left chest cardiac generator and leads are unchanged. Right upper extremity PICC tip terminates in the mid SVC.   CT Chest w/o Contrast    Impression    IMPRESSION:   1.  Large right pleural effusion increased from previous despite the presence of the chest tube.    2.  Complete collapse of the right lung with complete opacification of the right upper lobe, right lower lobe and right mainstem bronchus increased from previous.    3.  Small left pleural effusion increased from previous.     XR Chest Port 1 View    Impression    IMPRESSION: Stable position of the right basilar pigtail chest tube. Stable complete opacification of the right hemithorax with rib crowding compatible with large pleural effusion and associated collapse of the right lung. Right upper extremity PICC line   with tip overlying the mid aspect of the SVC. Cardiomediastinal silhouette is partially obscured by the above  process however appears grossly stable. Aortic valve replacement. Stable position of the left chest pacemaker. Trace left pleural effusion.   Streaky opacities at the left lung base favoring atelectasis. No pneumothorax. Unchanged osseous structures.   POC US Chest B-Scan    Impression    Limited Bedside Lung Ultrasound, performed and interpreted by me.   Indication: empyema and dyspnea    With the patient positioned supine, right posterior and lateral lung fields were examined for evidence of thoracic free fluid, pulmonary consolidation, and pulmonary edema.       Findings: moderate right pleural effusion noted, mostly free flowing. Some debris noted (plankton sign)  Chest tube visualized in the pleural effusion. Right lung atelectasis noted. No obvious loculations although scanning was limited due to presence of dressing.     IMPRESSION: moderate to large right pleural effusion with chest tube in place. No obvious loculations noted on this limited bedside pleural/lung POCUS.              Discharge Orders        Reason for your hospital stay    Right pleural effusion, dyspnea     Intake and output    Every shift     Daily weights    Call Provider for weight gain of more than 2 pounds per day or 5 pounds per week.     Glucose monitor nursing POCT    Before meals and at bedtime     IV access    Peripheral IV.     Additional Discharge Instructions    Pt discharging to [facility] for readmission in their  Intermediate care unit    Activity is bedrest  She will discharge with a chest tube on continuous suction     Full Code     Fall precautions     Diet    Diabetic diet       Examination   Physical Exam   Temp:  [97.4  F (36.3  C)-98.1  F (36.7  C)] 98.1  F (36.7  C)  Pulse:  [] 81  Resp:  [18-24] 20  BP: (100-122)/(56-87) 106/59  SpO2:  [91 %-98 %] 94 %  Wt Readings from Last 1 Encounters:   08/16/24 72.2 kg (159 lb 1.6 oz)     GENERAL:  Alert, frail, on 5 liters though no distress   HEAD:  Normocephalic,  without obvious abnormality, atraumatic   EYES:  PERRL, conjunctiva/corneas clear, no scleral icterus, EOM's intact   NOSE: Nares normal, septum midline, mucosa normal, no drainage   THROAT: Lips, mucosa, and tongue normal; teeth and gums normal, mouth moist   NECK: Supple, symmetrical, trachea midline   BACK:   Symmetric, no curvature, ROM normal   LUNGS:   Diminished breath sounds on right    CHEST WALL:  No tenderness or deformity   HEART:  Regular rate and rhythm, S1 and S2 normal, no murmur, rub, or gallop    ABDOMEN:   Soft, non-tender, bowel sounds active all four quadrants, no masses, no organomegaly, no rebound or guarding   EXTREMITIES: Extremities normal, atraumatic, no cyanosis or edema    SKIN: Dry to touch, no exanthems in the visualized areas   NEURO: Alert, oriented x 4, moves all four extremities freely/spontaneously   PSYCH: Cooperative, behavior is appropriate             Please see EMR for more detailed significant labs, imaging, consultant notes etc.    IAngela MD, personally saw the patient today and spent greater than 30 minutes discharging this patient.    Angela Kaiser MD  [facility]    CC:Cammy Bui

## 2024-08-17 NOTE — PROGRESS NOTES
Speech Language Therapy Discharge Summary    Reason for therapy discharge:    Discharged to [facility]    Progress towards therapy goal(s). See goals on Care Plan in [facility] electronic health record for goal details.  Goals met    Therapy recommendation(s):    No further therapy is recommended. Continue regular textures and thin liquids. Pt needs to be upright for all intake, chin tuck with liquids, slow rate, and small bites/sips. Please give pills in puree.

## 2024-08-17 NOTE — PROGRESS NOTES
General Surgery Progress Note  Hospital Day # 7    Subjective:   CC: Right-sided empyema  Status: Continues to have difficulty with breathing.  Right-sided empyema with culture positive Streptococcus pneumonia.    Vitals:    08/16/24 2335 08/17/24 0318 08/17/24 0756 08/17/24 0818   BP: 116/68 106/64 100/57 106/59   BP Location: Left arm Left arm Left arm    Pulse: 78 78  81   Resp: 22 20 20 20   Temp: 97.5  F (36.4  C) 97.4  F (36.3  C)  98.1  F (36.7  C)   TempSrc: Axillary Axillary  Oral   SpO2: 92% 98%  94%   Weight:       Height:           Physical Exam:  General: NAD, pleasant  Chest-chest tube in place with no evidence of air leak, draining cloudy fluid  EXT:no CCE    Recent Labs   Lab 08/17/24  0546 08/16/24  0603   WBC  --  8.2   HGB 9.8* 10.4*   HCT  --  36.6   PLT  --  211       Recent Labs   Lab 08/17/24  0546 08/16/24  0603 08/14/24  0512      < >  --    CO2 32*   < >  --    BUN 16.7   < >  --    ALBUMIN  --   --  3.0*   ALKPHOS  --   --  154*   ALT  --   --  107*   AST  --   --  26    < > = values in this interval not displayed.       Assessment: Right-sided empyema with complete collapse of the right lung     Plan: Case was discussed with the intensivist.  The plan will be for transfer to [facility].  I do not think a pigtail catheter will be in this patient's best interest.  At the very least she will need at least 2 large bore chest tubes.  We will likely perform a VATS procedure with decortication on Monday with placement of chest tubes.  -Plan for transfer today  -Acute care surgery will follow along    Jean Pearce DO [facility]  [phone]

## 2024-08-17 NOTE — PLAN OF CARE
Problem: Adult Inpatient Plan of Care  Goal: Optimal Comfort and Wellbeing  Intervention: Provide Person-Centered Care  Recent Flowsheet Documentation  Taken 8/17/2024 0400 by Waleska Henry RN  Trust Relationship/Rapport:   care explained   emotional support provided   empathic listening provided     Problem: Gas Exchange Impaired  Goal: Optimal Gas Exchange  Outcome: Progressing     Problem: Infection  Goal: Absence of Infection Signs and Symptoms  Intervention: Prevent or Manage Infection  Recent Flowsheet Documentation  Taken 8/17/2024 0400 by Waleska Henry RN  Isolation Precautions: special precautions maintained     Problem: Dysrhythmia  Goal: Normalized Cardiac Rhythm  Outcome: Not Progressing  Intervention: Monitor and Manage Cardiac Rhythm Effect  Recent Flowsheet Documentation  Taken 8/17/2024 0400 by Waleska Henry RN  VTE Prevention/Management: patient refused intervention   Goal Outcome Evaluation:    Patient resting well overnight. No complaints of pain or SOB. Congested cough while on NC, sats in the low 90s. New antibiotics given. Patient has been npo since midnight.

## 2024-08-17 NOTE — PROGRESS NOTES
Care Management Discharge Note    Discharge Date: 08/17/2024       Discharge Disposition: transfer to [facility]

## 2024-08-17 NOTE — SIGNIFICANT EVENT
RN called RT for assessment. When I arrived to room patient had 7L running on nasal cannula with significant desaturation in 60s. I initially applied oxymask on 15L to bring O2 saturation up. When saturation would not go past 79 on oxymask with a good signal on O2 probe I switched to a non-rebreather mask at 100%. When saturations would not increase past 84% with a stable signal, RN and I agreed to have a rapid called. Upon auscultation BS were diminished/bilateral. While on 100% NRB, I went to get a HFNC device to improve oxygenation. When I returned rapid response was called off and O2 probe had been relocated to forehead and patient was at 100% on 3L NC. RT will follow.    Esdras Villafana, RT

## 2024-08-17 NOTE — PHARMACY-ADMISSION MEDICATION HISTORY
PTA medication list was updated at [facility] on 8/10/24.      Lexa Roman, TerranceD

## 2024-08-17 NOTE — CONSULTS
See initial consult note by Dr. Julio from 8/10.    Addy Gardiner MD (Avi)  [facility] ([facility])  Clinic [phone]  Fax [phone]    No

## 2024-08-17 NOTE — PLAN OF CARE
Problem: Gas Exchange Impaired  Goal: Optimal Gas Exchange  Outcome: Progressing     Goal Outcome Evaluation:  RRT called as patient Sp02 desaturate to mid 50's with 5L NC 02, good waveform seen on monitor oximeter probe on left finger. Oximeter probe was changed on a different finger to check if it will make a difference, pt was asked to take deep breaths.   Pt is alert and oriented, denied SOB, talking to family at bedside.  P3 RT was called and pt was placed on 15L Oxymask, Sp02 70-80's.  Right Chest Tube to wall suction.

## 2024-08-17 NOTE — PLAN OF CARE
Occupational Therapy Discharge Summary    Reason for therapy discharge:    Discharged to [facility]    Progress towards therapy goal(s). See goals on Care Plan in [facility] electronic health record for goal details.  Goals not met.  Barriers to achieving goals:   limited tolerance for therapy.    Therapy recommendation(s):    Continued therapy is recommended.  Rationale/Recommendations:  maximize ADL IND.

## 2024-08-17 NOTE — SIGNIFICANT EVENT
Significant Event Note    Time of event: 4:13 PM August 17, 2024    Description of event:  Rapid response called for significant oxygen desaturation. In brief, patient is a 75 yo F w/ pmh of a flutter, COPD, ongoing tobacco abuse transferred from [facility] earlier today for planned VATS procedure on Monday. She has known large empyema of the R lung w/ chest tube in place draining serosanguinous fluid. On 3L O2 at baseline. Just prior to rapid response pt was alert and talking to bedside RN when they noted desats to the 50s. This improved slightly to mid 70s on 15L via oxymask. Pt continued to be alert and responsive during this time. As I arrived at bedside RT was present and placed forehead O2 probe. Once this was in place O2 sats read between 97 and 100 percent on PTA 3L O2.     Patient just portable CXR this AM showing complete opacification of R hemithorax w/ pigtail cath in place. Breath sounds present and no crackles/wheezes heard in the L lung fields. Overall quite reassuring and I do suspect that desats were falsely picked up on finger probe. Recommended continued use of forehead monitor, or at least confirmation w/ this as needed if sats are appearing low on finger pulse ox.     Plan:  - Will order VBG now out of abundance of caution  - Hold on further intervention or imaging for the time being    Discussed with: patient's family/emergency contact and bedside nurse, attending physician Dr Emile Cross MD

## 2024-08-17 NOTE — PLAN OF CARE
Pt A&Ox4, able to make needs known. 4-5L this shift, SOB with exertion. Denies pain, reports some discomfort at chest tube site but denies needing any PRN medication. Denies N/V, CP. Worked with OT this shift after encouragement. Little output from chest tube today. MD aware. Plan for new larger chest tube to be placed in the AM. NPO at midnight. Family aware of plan, questions answered by PA.     Rocio Burrell RN on 8/16/2024 at 7:28 PM

## 2024-08-17 NOTE — PROGRESS NOTES
Pulmonary Progress Note  8/17/2024      Admit Date: 8/10/2024  CODE: Full Code    Reason for Consult: acute respiratory failure with hypoxemia. Pneumonia and right-sided empyema s/p chest tube without resolution    Assessment/Plan:   74F w/ hx of COPD on home O2, afib on DOAC, aortic stenosis s/p TAVR, CAD, HTN, other issues, recently quit smoking, here with e. Coli pneumonia, strep pneumo right-sided empyema, large right pleural effusion that has failed to resolve despite chest tube, intrapleural lytics and adequate abx. Bedside pleural lung POCUS today shows persistent complicated effusion with chest tube in appropriate position. Minimal chest tube drainage after course of lytics. Surgery is indicated at this point. Discussed with patient and she agrees. Will need transfer to [facility] for VATS. Dr. Ramses khan.     Recommendations:  - titrate FiO2 for goal SpO2 88-92%, avoid hyperoxia  - encourage OOB, PT/OT, push IS  - discussed with surgery, [facility] -> initiate transfer to [facility]  - appreciate ID input; continue abx  - appreciate surgery team input  - keep chest tube to wall suction; record output qshift.     We'll continue to follow here and at [facility] whenever she transfer.  Discussed with patient and bedside RN.    Addy (Nelson) MD [place]  [facility] Pulmonary & Critical Care  Pager [phone]  Clinic [phone]  Fax [phone]     Subjective/Interim Events:   Feels OK.  On 5Lpm with Spo2 low to mid 90s.  Minimal chest tube output.   CXR unchanged.  Having some mild discomfort at the chest tube site; controlled with APAP.   2/p 5/6 doses of intrapleural lytics; last dose yesterday evening      Medications:     Current Facility-Administered Medications   Medication Dose Route Frequency Provider Last Rate Last Admin     Current Facility-Administered Medications   Medication Dose Route Frequency Provider Last Rate Last Admin    [Held by provider] apixaban ANTICOAGULANT (ELIQUIS)  tablet 5 mg  5 mg Oral BID Carmelita Buckley MD   5 mg at 08/14/24 2057    artificial saliva (BIOTENE MT) solution 1 spray  1 spray Mouth/Throat 4x Daily Carmelita Buckley MD   1 spray at 08/1950    atorvastatin (LIPITOR) tablet 20 mg  20 mg Oral At Bedtime Carmelita Buckley MD   20 mg at 08/16/24 2211    digoxin (LANOXIN) tablet 125 mcg  125 mcg Oral Daily Carmelita Buckley MD   125 mcg at 08/16/24 0851    diltiazem ER COATED BEADS (CARDIZEM CD/CARTIA XT) 24 hr capsule 120 mg  120 mg Oral Daily Carmelita Buckley MD   120 mg at 08/16/24 1404    fluconazole (DIFLUCAN) intermittent infusion 200 mg  200 mg Intravenous Q24H Robyn Trevino MD        fluticasone-vilanterol (BREO ELLIPTA) 200-25 MCG/ACT inhaler 1 puff  1 puff Inhalation Daily Carmelita Buckley MD   1 puff at 08/16/24 0848    [Held by provider] furosemide (LASIX) injection 20 mg  20 mg Intravenous Q8H Rayo Mendoza MD   20 mg at 08/10/24 1345    gabapentin (NEURONTIN) tablet 600 mg  600 mg Oral TID Carmelita Buckley MD   600 mg at 08/16/24 1951    insulin aspart (NovoLOG) injection (RAPID ACTING)  1-10 Units Subcutaneous TID  Rayo Mendoza MD   6 Units at 08/16/24 1734    insulin aspart (NovoLOG) injection (RAPID ACTING)  1-7 Units Subcutaneous At Bedtime Rayo Mendoza MD   4 Units at 08/16/24 2209    metoprolol tartrate (LOPRESSOR) tablet 50 mg  50 mg Oral BID Carmelita Buckley MD   50 mg at 08/16/24 2208    pantoprazole (PROTONIX) EC tablet 40 mg  40 mg Oral QAM AC Carmelita Buckley MD   40 mg at 08/16/24 0849    piperacillin-tazobactam (ZOSYN) 3.375 g vial to attach to  mL bag  3.375 g Intravenous Q8H Robyn Trevino MD   3.375 g at 08/16/24 2332    predniSONE (DELTASONE) tablet 30 mg  30 mg Oral Daily Enrico Pereyra MD        sennosides (SENOKOT) tablet 1 tablet  1 tablet Oral BID Carmelita Buckley MD   1 tablet at 08/16/24 1951    sodium chloride (PF) 0.9% PF flush 10-40 mL  10-40 mL Intracatheter Q7 Days Carmelita Buckley MD        sodium chloride (PF)  "0.9% PF flush 3 mL  3 mL Intracatheter Q8H Carmelita Buckley MD   3 mL at 08/16/24 1736    sucralfate (CARAFATE) tablet 1 g  1 g Oral TID AC Carmelita Buckley MD   1 g at 08/16/24 1734         Exam/Data:   Vitals  /57 (BP Location: Left arm)   Pulse 78   Temp 97.4  F (36.3  C) (Axillary)   Resp 20   Ht 1.651 m (5' 5\")   Wt 72.2 kg (159 lb 1.6 oz)   LMP  (LMP Unknown)   SpO2 98%   BMI 26.48 kg/m    BP - Mean:  [73-74] 74  I/O last 3 completed shifts:  In: 1860 [P.O.:1360; I.V.:400; Other:100]  Out: 450 [Urine:450]  Weight change:   [unfilled]  Resp: 20    EXAM:  Physical Exam  Gen: awake, alert, oriented, no distress  HEENT: NT, no AUGUST  CV: RRR, no m/g/r  Resp: diminished at right base. Chest tube site c/d/I, dressing in place  Abd: soft, nontender, BS+  Skin: no rashes or lesions  Ext: no edema  Neuro: PERRL, nonfocal exam    ROS:  A 10-system review was obtained and is negative with the exception of the symptoms noted above.    DATA:    PFT DATA   None available    Micro  PCT wnl  Viral swabs neg    Bronch/BAL 8/11  Culture + for e. Coli, pan-sensitive  1+ yeast, candida albicans  Total nuc cells 22, 65% PMN, 9% lymphs, 20% lining cells  Cytology neg for malignancy    Pleural fluid 8/11  Culture + for strep pneumo, pan-sensitive  Total nuc cells 1451  73% PMN, 22% lymphs, 5% mono/mac  Glucose 159    Protein 3.3  TG 19  No cytology sent    Sputum 8/12  2+ strep pneumo  1+ e. Coli, pan-sensitive        IMAGING:   XR Chest 1 View    Result Date: 8/11/2024  EXAM: XR CHEST 1 VIEW LOCATION: [facility] DATE: 08/11/2024 INDICATION: New chest tube placement. COMPARISON: 08/10/2024 CXR and CT chest.     IMPRESSION: Interval placement right basilar pleural drainage catheter. Previously seen large right pleural effusion has been nearly completely evacuated and the mid and lower right lung have partially reexpanded. No pneumothorax. Left lung clear. Mild cardiac enlargement. " Transcatheter aortic valve replacement. Pacer anterior left chest wall with lead tips in the RA and RV. Right PICC tip RA/SVC junction.     XR Chest Port 1 View    Result Date: 8/10/2024  EXAM: XR CHEST PORT 1 VIEW LOCATION: [facility] DATE: 8/10/2024 INDICATION: RN placed PICC   verify tip placement COMPARISON: 8/10/2024     IMPRESSION: New right PICC with tip near the cavoatrial junction. Otherwise, no significant interval change.    XR Chest Port 1 View    Result Date: 8/10/2024  EXAM: XR CHEST PORTABLE 1 VIEW LOCATION: [facility] DATE: 08/10/2024 INDICATION: Status post right pigtail chest tube. COMPARISON: 08/10/2024 at 1010 hours.     IMPRESSION: No change in dual-lead cardiac pacer or TAVR. Right-sided chest tube has been placed since comparison study with decrease in the previously seen large right pleural effusion. A moderate to large pleural effusion remains. There is likely overlying compressive atelectasis of the inferior portion of the right lung with concurrent infectious process not excluded. There is some indistinctness of the pulmonary interstitium involving the left lower lobe which is new from prior study and may reflect airway inflammation or edema.     CT Chest w Contrast    Result Date: 8/10/2024  EXAM: CT CHEST W CONTRAST LOCATION: [facility] DATE: 8/10/2024 INDICATION: SOB, large right-sided pleural effusion status post right pigtail COMPARISON: None. TECHNIQUE: CT chest with IV contrast. Multiplanar reformats were obtained. Dose reduction techniques were used. CONTRAST: 90 mL Isovue-370 FINDINGS: LUNGS AND PLEURA: Large right effusion. Pigtail catheter is at the anterior right apex without significant fluid surrounding it. Extensive compressive atelectasis throughout the right lung. Left lung clear. MEDIASTINUM/AXILLAE: No lymphadenopathy. No thoracic aneurysm. CORONARY ARTERY CALCIFICATION: Moderate. UPPER  ABDOMEN: No acute findings. MUSCULOSKELETAL: No frankly destructive bony lesions.     IMPRESSION: 1.  Large right effusion and extensive compressive atelectasis versus less likely infiltrate in the right lung. 2.  The pigtail catheter tip is at the anterior apex, most of the fluid is at the base.        XR Chest Port 1 View    Result Date: 8/10/2024  EXAM: XR CHEST PORT 1 VIEW LOCATION: [facility] DATE: 8/10/2024 INDICATION: sob, hypoxic, history of prior ptx, eval for ptx, pna or edema COMPARISON: 7/17/2020     IMPRESSION: Large right pleural effusion. No pneumothorax. Left subclavian approach pacing device. Prosthetic aortic valve. Cardiac silhouette within normal limits. No acute osseous abnormality.

## 2024-08-17 NOTE — PLAN OF CARE
Patient transferred to  at [facility] via [facility]. Chest tube in place, EMS to maintain suction to -20 cm H20 during transport. Report called to SALVADOR Young at [facility].

## 2024-08-17 NOTE — PROGRESS NOTES
[facility]    Medicine Progress Note - Hospitalist Service    Date of Admission:  8/17/2024    Assessment & Plan      24-year-old female with a past medical history of a flutter, COPD, ongoing tobacco abuse, previous pleural effusions and chronic pain admitted to the hospital with empyema    Empyema  --- Failing management with chest tube and lytics, CT showing larger size yesterday   ---transfer today from [facility] to [facility] in anticipation of VATS procedure with general surgery  --- General surgery and pulmonology following  --- Continue chest tube  --- Anticipating VATS Monday, continue to hold Eliquis  --- Underwent bronchoscopy 8/11 cultures showing E. coli and Candida albicans with pleural effusion cultures showing strep pneumo  --- ID consulted yesterday, discontinued ceftriaxone and started Zosyn and fluconazole and recommended source control    Acute on chronic respiratory failure  Advanced COPD  Tobacco dependence  --- Has been on 5 L as opposed to usual 3 L at home  --- Has been on oral taper of steroids for her COPD  --- CTs per above    Paroxysmal A-fib/a flutter; status post PPM  Aortic stenosis status post TAVR  Acute on chronic HFpEF  --- Cardiology involved and patient had been on Cardizem drip.  --- Currently on metoprolol and diltiazem and digoxin  --- She is off amiodarone due to QTc prolongation  --- Have been holding her Lasix  --- Cardiology signed off 8/15    Deconditioning   ---was refusing to work with PT OT--at [facility].  --- May discharge recommendations eventually    Normocytic anemia; overall stable    Hyperlipidemia--statin ordered    Diabetes mellitus, type II  --- Not on home meds  --- Last A1c 6.2.  Will repeat  --- Checks look okay  --- Continue insulin sliding scale          Diet: Fluid restriction 1800 ML FLUID  Moderate Consistent Carb (60 g CHO per Meal) Diet    DVT Prophylaxis: DOAC  Chairez Catheter: Not present  Lines: PRESENT      PICC 08/10/24  "Triple Lumen Right Brachial vein medial-Site Assessment: WDL      Cardiac Monitoring: ACTIVE order. Indication: Tachyarrhythmias, acute (48 hours)  Code Status: Full Code      Clinically Significant Risk Factors Present on Admission               # Drug Induced Coagulation Defect: home medication list includes an anticoagulant medication    # Hypertension: Noted on problem list    # Anemia: based on hgb <11       # Overweight: Estimated body mass index is 26.48 kg/m  as calculated from the following:    Height as of 8/10/24: 1.651 m (5' 5\").    Weight as of 8/16/24: 72.2 kg (159 lb 1.6 oz).       # Financial/Environmental Concerns:     # Pacemaker present             Disposition Plan     Medically Ready for Discharge: Anticipated in 5+ Days             Antonia Baptiste MD  Hospitalist Service  [facility]  Securely message with [employer] (more info)  Text page via [facility] Paging/Directory   ______________________________________________________________________    Interval History   --- Patient seen and chart reviewed  --- 74-year-old female with a history of COPD and tobacco abuse as well as aortic stenosis status post TAVR and A-fib on Eliquis [facility] with  --- When I see her in bed she admits to mild pain surrounding chest tube, overall has been controlled with Tylenol only.  --- Mild dyspnea stable for her.  --- Denies nausea and vomiting and lots of water    Physical Exam   Vital Signs: Temp: 98.4  F (36.9  C) Temp src: Oral BP: 101/55 Pulse: 80   Resp: 20 SpO2: 92 % O2 Device: None (Room air) Oxygen Delivery: 5 LPM  Weight: 0 lbs 0 oz    General Appearance: Pleasant frail, NAD  Respiratory: quiet but course bs bilaterally  Cardiovascular: Regular rate and rhythm, no murmurs rubs or gallops  GI: Soft and nontender without hepatosplenomegaly  Skin: Frail skin with senile purpura and tattoos noted without open areas.  Other: Neuro grossly intact without focal deficits " appreciated    Medical Decision Making             Data     I have personally reviewed the following data over the past 24 hrs:    N/A  \   9.8 (L)   / N/A     143 105 16.7 /  210 (H)   4.3 32 (H) 0.52 \       Imaging results reviewed over the past 24 hrs:   Recent Results (from the past 24 hour(s))   CT Chest w/o Contrast    Narrative    EXAM: CT CHEST W/O CONTRAST  LOCATION: [facility]  DATE: 8/16/2024    INDICATION: Recheck chest tube placement, clogged, etc. and recheck effusion given patient increase O2 and tachycardia  COMPARISON: 8/13/2024  TECHNIQUE: CT chest without IV contrast. Multiplanar reformats were obtained. Dose reduction techniques were used.  CONTRAST: None.    FINDINGS:   LUNGS AND PLEURA: Large right pleural effusion increased from previous. Complete collapse of the right lung. The right mainstem, upper and lower lobe bronchi are essentially completely occluded increased from previous. Single right chest tube.    Small left pleural effusion with passive atelectasis left lung base slightly increased from previous.    MEDIASTINUM/AXILLAE: Transvenous pacemaker. Aortic valve prosthesis. Right PICC line tip in SVC.    CORONARY ARTERY CALCIFICATION: Severe.    UPPER ABDOMEN: No significant finding.    MUSCULOSKELETAL: Unremarkable.      Impression    IMPRESSION:   1.  Large right pleural effusion increased from previous despite the presence of the chest tube.    2.  Complete collapse of the right lung with complete opacification of the right upper lobe, right lower lobe and right mainstem bronchus increased from previous.    3.  Small left pleural effusion increased from previous.     XR Chest Port 1 View    Narrative    EXAM: XR CHEST PORT 1 VIEW  LOCATION: [facility]  DATE: 8/17/2024    INDICATION: Recheck chest tube and pleural effusions  COMPARISON: CT chest without contrast 08/16/2024, chest radiograph 08/15/2024      Impression    IMPRESSION:  Stable position of the right basilar pigtail chest tube. Stable complete opacification of the right hemithorax with rib crowding compatible with large pleural effusion and associated collapse of the right lung. Right upper extremity PICC line   with tip overlying the mid aspect of the SVC. Cardiomediastinal silhouette is partially obscured by the above process however appears grossly stable. Aortic valve replacement. Stable position of the left chest pacemaker. Trace left pleural effusion.   Streaky opacities at the left lung base favoring atelectasis. No pneumothorax. Unchanged osseous structures.   POC US Chest B-Scan    Impression    Limited Bedside Lung Ultrasound, performed and interpreted by me.   Indication: empyema and dyspnea    With the patient positioned supine, right posterior and lateral lung fields were examined for evidence of thoracic free fluid, pulmonary consolidation, and pulmonary edema.       Findings: moderate right pleural effusion noted, mostly free flowing. Some debris noted (plankton sign)  Chest tube visualized in the pleural effusion. Right lung atelectasis noted. No obvious loculations although scanning was limited due to presence of dressing.     IMPRESSION: moderate to large right pleural effusion with chest tube in place. No obvious loculations noted on this limited bedside pleural/lung POCUS.

## 2024-08-17 NOTE — PROGRESS NOTES
CLINICAL NUTRITION SERVICES     Reviewed nutrition risk factors due to LOS. Pt is tolerating diet, eating % x2-3 meals/day per nursing documentation. Current weight is up. No nutrition issues identified at this time. RD will follow peripherally at this time, unless consulted.

## 2024-08-18 ENCOUNTER — APPOINTMENT (OUTPATIENT)
Dept: RADIOLOGY | Facility: HOSPITAL | Age: 74
DRG: 166 | End: 2024-08-18
Attending: EMERGENCY MEDICINE
Payer: COMMERCIAL

## 2024-08-18 PROBLEM — J86.9 EMPYEMA (H): Status: ACTIVE | Noted: 2024-08-18

## 2024-08-18 LAB
ALBUMIN SERPL BCG-MCNC: 2.2 G/DL (ref 3.5–5.2)
ALP SERPL-CCNC: 105 U/L (ref 40–150)
ALT SERPL W P-5'-P-CCNC: 31 U/L (ref 0–50)
ANION GAP SERPL CALCULATED.3IONS-SCNC: 3 MMOL/L (ref 7–15)
AST SERPL W P-5'-P-CCNC: 20 U/L (ref 0–45)
BILIRUB DIRECT SERPL-MCNC: <0.2 MG/DL (ref 0–0.3)
BILIRUB SERPL-MCNC: 0.4 MG/DL
BUN SERPL-MCNC: 14.4 MG/DL (ref 8–23)
CALCIUM SERPL-MCNC: 8.5 MG/DL (ref 8.8–10.4)
CHLORIDE SERPL-SCNC: 103 MMOL/L (ref 98–107)
CREAT SERPL-MCNC: 0.5 MG/DL (ref 0.51–0.95)
EGFRCR SERPLBLD CKD-EPI 2021: >90 ML/MIN/1.73M2
GLUCOSE BLDC GLUCOMTR-MCNC: 134 MG/DL (ref 70–99)
GLUCOSE BLDC GLUCOMTR-MCNC: 189 MG/DL (ref 70–99)
GLUCOSE BLDC GLUCOMTR-MCNC: 296 MG/DL (ref 70–99)
GLUCOSE BLDC GLUCOMTR-MCNC: 324 MG/DL (ref 70–99)
GLUCOSE SERPL-MCNC: 143 MG/DL (ref 70–99)
HBA1C MFR BLD: NORMAL %
HCO3 SERPL-SCNC: 34 MMOL/L (ref 22–29)
MAGNESIUM SERPL-MCNC: 2.1 MG/DL (ref 1.7–2.3)
MAGNESIUM SERPL-MCNC: NORMAL MG/DL
POTASSIUM SERPL-SCNC: 4 MMOL/L (ref 3.4–5.3)
PROT SERPL-MCNC: 5.2 G/DL (ref 6.4–8.3)
SODIUM SERPL-SCNC: 140 MMOL/L (ref 135–145)

## 2024-08-18 PROCEDURE — 36415 COLL VENOUS BLD VENIPUNCTURE: CPT | Performed by: EMERGENCY MEDICINE

## 2024-08-18 PROCEDURE — 82962 GLUCOSE BLOOD TEST: CPT

## 2024-08-18 PROCEDURE — 250N000011 HC RX IP 250 OP 636: Performed by: EMERGENCY MEDICINE

## 2024-08-18 PROCEDURE — 83735 ASSAY OF MAGNESIUM: CPT | Performed by: EMERGENCY MEDICINE

## 2024-08-18 PROCEDURE — 120N000004 HC R&B MS OVERFLOW

## 2024-08-18 PROCEDURE — 83735 ASSAY OF MAGNESIUM: CPT | Performed by: FAMILY MEDICINE

## 2024-08-18 PROCEDURE — 99233 SBSQ HOSP IP/OBS HIGH 50: CPT | Performed by: FAMILY MEDICINE

## 2024-08-18 PROCEDURE — 71045 X-RAY EXAM CHEST 1 VIEW: CPT

## 2024-08-18 PROCEDURE — 99232 SBSQ HOSP IP/OBS MODERATE 35: CPT | Performed by: SURGERY

## 2024-08-18 PROCEDURE — 250N000012 HC RX MED GY IP 250 OP 636 PS 637: Performed by: EMERGENCY MEDICINE

## 2024-08-18 PROCEDURE — 99222 1ST HOSP IP/OBS MODERATE 55: CPT | Performed by: INTERNAL MEDICINE

## 2024-08-18 PROCEDURE — 250N000009 HC RX 250: Performed by: FAMILY MEDICINE

## 2024-08-18 PROCEDURE — 82248 BILIRUBIN DIRECT: CPT | Performed by: FAMILY MEDICINE

## 2024-08-18 PROCEDURE — 83036 HEMOGLOBIN GLYCOSYLATED A1C: CPT | Performed by: FAMILY MEDICINE

## 2024-08-18 PROCEDURE — 80048 BASIC METABOLIC PNL TOTAL CA: CPT | Performed by: EMERGENCY MEDICINE

## 2024-08-18 PROCEDURE — 999N000157 HC STATISTIC RCP TIME EA 10 MIN

## 2024-08-18 PROCEDURE — 94640 AIRWAY INHALATION TREATMENT: CPT | Mod: 76

## 2024-08-18 PROCEDURE — 250N000013 HC RX MED GY IP 250 OP 250 PS 637: Performed by: EMERGENCY MEDICINE

## 2024-08-18 RX ORDER — ACETAMINOPHEN 325 MG/1
975 TABLET ORAL ONCE
Status: COMPLETED | OUTPATIENT
Start: 2024-08-19 | End: 2024-08-19

## 2024-08-18 RX ORDER — LIDOCAINE 40 MG/G
CREAM TOPICAL
Status: DISCONTINUED | OUTPATIENT
Start: 2024-08-18 | End: 2024-08-19 | Stop reason: HOSPADM

## 2024-08-18 RX ORDER — SODIUM CHLORIDE, SODIUM LACTATE, POTASSIUM CHLORIDE, CALCIUM CHLORIDE 600; 310; 30; 20 MG/100ML; MG/100ML; MG/100ML; MG/100ML
INJECTION, SOLUTION INTRAVENOUS CONTINUOUS
Status: DISCONTINUED | OUTPATIENT
Start: 2024-08-19 | End: 2024-08-19 | Stop reason: HOSPADM

## 2024-08-18 RX ORDER — PREDNISONE 20 MG/1
20 TABLET ORAL DAILY
Status: DISCONTINUED | OUTPATIENT
Start: 2024-08-19 | End: 2024-08-19 | Stop reason: ALTCHOICE

## 2024-08-18 RX ORDER — PREDNISONE 5 MG/1
10 TABLET ORAL DAILY
Status: DISCONTINUED | OUTPATIENT
Start: 2024-08-23 | End: 2024-08-19 | Stop reason: ALTCHOICE

## 2024-08-18 RX ORDER — CEFAZOLIN SODIUM/WATER 2 G/20 ML
2 SYRINGE (ML) INTRAVENOUS
Status: COMPLETED | OUTPATIENT
Start: 2024-08-18 | End: 2024-08-19

## 2024-08-18 RX ORDER — IPRATROPIUM BROMIDE AND ALBUTEROL SULFATE 2.5; .5 MG/3ML; MG/3ML
3 SOLUTION RESPIRATORY (INHALATION)
Status: DISPENSED | OUTPATIENT
Start: 2024-08-18 | End: 2024-08-20

## 2024-08-18 RX ADMIN — Medication 1 SPRAY: at 17:13

## 2024-08-18 RX ADMIN — PIPERACILLIN AND TAZOBACTAM 3.38 G: 3; .375 INJECTION, POWDER, FOR SOLUTION INTRAVENOUS at 08:13

## 2024-08-18 RX ADMIN — PIPERACILLIN AND TAZOBACTAM 3.38 G: 3; .375 INJECTION, POWDER, FOR SOLUTION INTRAVENOUS at 23:36

## 2024-08-18 RX ADMIN — ACETAMINOPHEN 650 MG: 325 TABLET ORAL at 09:25

## 2024-08-18 RX ADMIN — PIPERACILLIN AND TAZOBACTAM 3.38 G: 3; .375 INJECTION, POWDER, FOR SOLUTION INTRAVENOUS at 17:13

## 2024-08-18 RX ADMIN — ACETAMINOPHEN 650 MG: 325 TABLET ORAL at 14:15

## 2024-08-18 RX ADMIN — SENNOSIDES 1 TABLET: 8.6 TABLET, FILM COATED ORAL at 08:13

## 2024-08-18 RX ADMIN — SUCRALFATE 1 G: 1 TABLET ORAL at 12:05

## 2024-08-18 RX ADMIN — FLUCONAZOLE 200 MG: 2 INJECTION, SOLUTION INTRAVENOUS at 20:25

## 2024-08-18 RX ADMIN — SUCRALFATE 1 G: 1 TABLET ORAL at 17:13

## 2024-08-18 RX ADMIN — GABAPENTIN 600 MG: 300 CAPSULE ORAL at 20:25

## 2024-08-18 RX ADMIN — METOPROLOL TARTRATE 50 MG: 25 TABLET, FILM COATED ORAL at 08:13

## 2024-08-18 RX ADMIN — GABAPENTIN 600 MG: 300 CAPSULE ORAL at 14:13

## 2024-08-18 RX ADMIN — ATORVASTATIN CALCIUM 20 MG: 10 TABLET, FILM COATED ORAL at 20:26

## 2024-08-18 RX ADMIN — GABAPENTIN 600 MG: 300 CAPSULE ORAL at 08:08

## 2024-08-18 RX ADMIN — IPRATROPIUM BROMIDE AND ALBUTEROL SULFATE 3 ML: .5; 3 SOLUTION RESPIRATORY (INHALATION) at 16:48

## 2024-08-18 RX ADMIN — PANTOPRAZOLE SODIUM 40 MG: 40 TABLET, DELAYED RELEASE ORAL at 06:45

## 2024-08-18 RX ADMIN — PREDNISONE 30 MG: 5 TABLET ORAL at 08:12

## 2024-08-18 RX ADMIN — SUCRALFATE 1 G: 1 TABLET ORAL at 06:45

## 2024-08-18 RX ADMIN — Medication 1 SPRAY: at 14:15

## 2024-08-18 RX ADMIN — DILTIAZEM HYDROCHLORIDE 120 MG: 120 CAPSULE, EXTENDED RELEASE ORAL at 08:12

## 2024-08-18 RX ADMIN — DIGOXIN 125 MCG: 125 TABLET ORAL at 08:12

## 2024-08-18 RX ADMIN — IPRATROPIUM BROMIDE AND ALBUTEROL SULFATE 3 ML: .5; 3 SOLUTION RESPIRATORY (INHALATION) at 20:45

## 2024-08-18 RX ADMIN — SENNOSIDES 1 TABLET: 8.6 TABLET, FILM COATED ORAL at 20:26

## 2024-08-18 RX ADMIN — Medication 1 SPRAY: at 08:14

## 2024-08-18 RX ADMIN — Medication 1 SPRAY: at 20:26

## 2024-08-18 RX ADMIN — INSULIN ASPART 4 UNITS: 100 INJECTION, SOLUTION INTRAVENOUS; SUBCUTANEOUS at 20:26

## 2024-08-18 RX ADMIN — FLUTICASONE FUROATE AND VILANTEROL TRIFENATATE 1 PUFF: 200; 25 POWDER RESPIRATORY (INHALATION) at 08:18

## 2024-08-18 RX ADMIN — METOPROLOL TARTRATE 50 MG: 25 TABLET, FILM COATED ORAL at 20:26

## 2024-08-18 RX ADMIN — ACETAMINOPHEN 650 MG: 325 TABLET ORAL at 20:26

## 2024-08-18 ASSESSMENT — ACTIVITIES OF DAILY LIVING (ADL)
ADLS_ACUITY_SCORE: 52
ADLS_ACUITY_SCORE: 50
ADLS_ACUITY_SCORE: 52
ADLS_ACUITY_SCORE: 50
ADLS_ACUITY_SCORE: 52
ADLS_ACUITY_SCORE: 52
ADLS_ACUITY_SCORE: 50
ADLS_ACUITY_SCORE: 52
ADLS_ACUITY_SCORE: 50
ADLS_ACUITY_SCORE: 52
ADLS_ACUITY_SCORE: 50
ADLS_ACUITY_SCORE: 52
ADLS_ACUITY_SCORE: 50

## 2024-08-18 NOTE — PLAN OF CARE
Problem: Adult Inpatient Plan of Care  Goal: Absence of Hospital-Acquired Illness or Injury  Intervention: Identify and Manage Fall Risk  Recent Flowsheet Documentation  Taken 8/17/2024 2340 by Amor Roman RN  Safety Promotion/Fall Prevention:   activity supervised   nonskid shoes/slippers when out of bed   patient and family education   room near nurse's station   supervised activity   safety round/check completed  Intervention: Prevent Infection  Recent Flowsheet Documentation  Taken 8/17/2024 2340 by Amor Roman, RN  Infection Prevention:   hand hygiene promoted   rest/sleep promoted  Goal: Optimal Comfort and Wellbeing  Intervention: Provide Person-Centered Care  Recent Flowsheet Documentation  Taken 8/17/2024 2340 by Amor Roman RN  Trust Relationship/Rapport:   care explained   choices provided   emotional support provided   empathic listening provided   questions answered   questions encouraged   reassurance provided   thoughts/feelings acknowledged     Problem: Comorbidity Management  Goal: Maintenance of COPD Symptom Control  Intervention: Maintain COPD Symptom Control  Recent Flowsheet Documentation  Taken 8/17/2024 2340 by Amor Roman RN  Supportive Measures:   active listening utilized   verbalization of feelings encouraged  Medication Review/Management: medications reviewed  Goal: Blood Glucose Levels Within Targeted Range  Intervention: Monitor and Manage Glycemia  Recent Flowsheet Documentation  Taken 8/17/2024 2340 by Amor Roman RN  Medication Review/Management: medications reviewed  Goal: Blood Pressure in Desired Range  Intervention: Maintain Blood Pressure Management  Recent Flowsheet Documentation  Taken 8/17/2024 2340 by Amor Roman RN  Medication Review/Management: medications reviewed   Goal Outcome Evaluation:    Patient is alert and oriented x 4. Pleasant and cooperative. Notably lethargic. Denies pain upon assessment. Denies nausea and vomiting. She remains  "on supplemental oxygen. O2 sats maintained in the low 90s with occasional dips into the 80s. Patient refused to be turned and repositioned and stated she was \"comfortable\" as is. Chest tube in place to -20 cm H20 suction. 20 ml of serosanguinous fluid out overnight.  Telemetry: a flutter                            "

## 2024-08-18 NOTE — PROGRESS NOTES
Addended General Surgery Progress Note    Subjective:   Feels about the same.  Does not feel like she is improving.  She does like that her chest tube is now putting out some fluid.    Vitals:    08/17/24 2340 08/18/24 0502 08/18/24 0520 08/18/24 0740   BP: 100/55  97/54 106/56   BP Location: Left arm  Left arm Left arm   Patient Position: Semi-Man's  Semi-Man's    Pulse: 79  80 81   Resp: 20 20 18   Temp: 98  F (36.7  C)  97.7  F (36.5  C) 98  F (36.7  C)   TempSrc: Oral  Oral Oral   SpO2: 91% 90% 92% 90%       08/17 0700 - 08/18 0659  In: 328 [P.O.:100; I.V.:228]  Out: 1310 [Urine:500]    Physical Exam:  General: NAD, pleasant  LUNGS: Right chest tube connected to atrium with a total of 1800 mL of serosanguineous fluid within it    Assessment:   74-year-old female with a right sided empyema and complete collapse of the right lung.  Despite a significant amount of drain output, the lung remains collapsed.    Plan:   Significant discussion occurred between surgeons within the group and the patient.  Since the [facility] refuses to take the patient for her chest problem so she can be taken care of by a chest surgeon, we will tentatively board her for a VATS tomorrow.  The patient understands that she is at high risk for this surgery.  Hopefully decortication and large bore chest tubes can be placed and the lung will reexpand.  Due to her risk, if her anatomy does not work well for the less invasive procedure, at this time it would not be planned for her to proceed with thoracotomy.  She should be NPO at midnight.     Ernestine Martel DO  General Surgeon  [facility]  Surgery [address]  [address]  [place] [address]  Office: [phone]  Employed by [facility]

## 2024-08-18 NOTE — CONSULTS
PULMONARY / CRITICAL CARE CONSULT NOTE    Date / Time of Admission:  8/10/2024  9:18 AM    Assessment:     Mary Kay Tejada is a 74 year old female with history of HTN, CAD, HFpEF, afib on DOAC, aortic stenosis s/p TAVR, s/p pacemaker, COPD, tobacco user.   Recent admission 7/12 to 7/17 with acute hypercarbic respiratory failure, pneumonia and COVI19 viral infection. CT scan showed RUL collapse, right effusion. Sputum Cx (+) E coli. Developed pneumothorax post right side thoracentesis. Improvement of pneumothorax in serial CXR. Discharged home on O2 and Abx.  Presents to ED on 8/10/2024 for evaluation of shortness of breath. Patient was in moderate respiratory distress, hypotensive, hypoxic. Elevated WBC, negative procalcitonin. CXR showed large right pleural effusion. Chest tube was placed on admission. Chest CT scan showed RUL and RLL atelectasis, debris in airway and moderate pleural effusion. Admitted to ICU , treated for pneumonia. Required pressors.   Underwent reposition chest tube on 8/11 and diagnostic bronchoscopy. No endobronchial lesions.   Developed a flutter with RVR, started on amiodarone drip, later discontinued due to prolonged QT. Patient tolerated digoxin and diltiazem. Pleural fluid (+) strep pneumonia. Follow up chest CT scan showed loculated effusion, started on intrapleural lytics. Follow up CXR showed large right effusion. Surgery consulted, plan for VATS plus decortication. Transferred to [facility].      Acute respiratory failure   Empyema  S/p pig tail chest tube 8/10 and reposition on 8/11 with resolution of pleural effusion.   Pleural fluid grew Strep pneumonia.   Follow up CT scan 8/13 showed re accumulation of pleural effusion, loculated effusion.   Started on intrapleural lytics. Follow up CXR 8/15 showed large right effusion with total opacification of right hemithorax. Surgery consult for VATS and decortication.   High risk for surgical procedure. It was initially planned to  transfer patient to the [facility], but large chest tubes were recommended.   Patient was transferred to [facility] for VATS procedure.   Pneumonia   CT scan showed debris in airway. Chronic collapse right upper lobe. Atelectasis RLL.   S/p diagnostic bronchoscopy 8/11, oral candidiasis, hyperemic mucosa, and mild thick secretions, no endobronchial lesions. BAL RUL and RLL were done.  BAL grew E coli. Sputum Cx grew E coli and strep pneumonia.   Diet per speech therapy.   COPD exacerbation  Steroid taper. Continue schedule bronchodilators   Resolved a fib / flutter with RVR  Cardiology is following. Off amiodarone due to prolonged QT interval.   On digoxin, metoprolol and diltiazem  Patient is anticoagulated.   Oral candidiasis   HTN, CAD, HFpEF, aortic stenosis s/p TAVR, s/p pacemaker  Tobacco user    Advance Directives:  Full code    Plan:   Titrate FiO2 to keep SpO2 > 90%, currently on nasal cannula 4 LPM  Schedule bronchodilators  Taper systemic steroids  Plan for VATS and decortication on 8/19  Abx zosyn and fluconazole  Titrate BP meds  Monitor kidney function   Supplement electrolytes as needed  Diet per speech therapy   Glucose level monitoring   DVT prophylaxis , apixaban on hold     Please contact me if you have any questions.    Modesto Joseph  Pulmonary / Critical Care  08/18/2024  8:30 AM      Reason of consult: empyema   HPI:  Mary Kay Tejada is a 74 year old female with history of HTN, CAD, HFpEF, afib on DOAC, aortic stenosis s/p TAVR, s/p pacemaker, COPD, tobacco user.   Recent admission 7/12 to 7/17 with acute hypercarbic respiratory failure, pneumonia and COVI19 viral infection. CT scan showed RUL collapse, right effusion. Sputum Cx (+) E coli. Developed pneumothorax post right side thoracentesis. Improvement of pneumothorax in serial CXR. Discharged home on O2 and Abx.  Presents to ED on 8/10/2024 for evaluation of shortness of breath. Patient was in moderate  respiratory distress, hypotensive, hypoxic. Elevated WBC, negative procalcitonin. CXR showed large right pleural effusion. Chest tube was placed on admission. Chest CT scan showed RUL and RLL atelectasis, debris in airway and moderate pleural effusion. Admitted to ICU , treated for pneumonia. Required pressors.   Underwent reposition chest tube on 8/11 and diagnostic bronchoscopy. No endobronchial lesions.   Developed a flutter with RVR, started on amiodarone drip, later discontinued due to prolonged QT. Patient tolerated digoxin and diltiazem. Pleural fluid (+) strep pneumonia. Follow up chest CT scan showed loculated effusion, started on intrapleural lytics. Follow up CXR showed large right effusion. Surgery consulted, plan for VATS plus decortication. Transferred to [facility].      Past Medical History:   Diagnosis Date    Anemia     Aortic stenosis     Aortic valve disorder     Atrial fibrillation (H)     Atrial flutter (H)     Benign neoplasm of adenomatous polyp     large intestine     Chronic constipation     Chronic heart failure with preserved ejection fraction (H) 02/29/2024    Chronic pain syndrome     Congestive heart failure (H)     COPD (chronic obstructive pulmonary disease) (H)     Oxygen at night     Dependence on supplemental oxygen     Oxygen at noc, during the day as needed    Depression     Diabetes mellitus (H)     Dry eye syndrome     Fibromyalgia     Ganglion     left wrist    GERD (gastroesophageal reflux disease)     Hyperlipidemia     Hypertension     Hypokalemia     Infective otitis externa, unspecified     Created by Conversion     Larynx edema     Lung disease     Malignant neoplasm of vulva (H)     Created by Conversion [facility] Annotation: Apr 17 2007  8:24AM - Cammy Bui:  resection per Dr. Alfonso Mane 9/06;  Replacement Utility updated for latest IMO load    Medial epicondylitis     Onychomycosis     Osteoarthritis     Peptic ulcer     Polyneuropathy      Vulvar malignant neoplasm (H)      Allergies: Celebrex [celecoxib] and Latex     MEDS:  Current Facility-Administered Medications   Medication Dose Route Frequency Provider Last Rate Last Admin    acetaminophen (TYLENOL) tablet 650 mg  650 mg Oral Q4H PRN Angela Kaiser MD        Or    acetaminophen (TYLENOL) Suppository 650 mg  650 mg Rectal Q4H PRN Angela Kaiser MD        [Held by provider] albuterol (PROVENTIL HFA/VENTOLIN HFA) inhaler  2 puff Inhalation Q4H PRN Angela Kaiser MD        [Held by provider] apixaban ANTICOAGULANT (ELIQUIS) tablet 5 mg  5 mg Oral BID Angela Kaiser MD        artificial saliva (BIOTENE MT) solution 1 spray  1 spray Mouth/Throat 4x Daily Angela Kaiser MD   1 spray at 08/18/24 0814    atorvastatin (LIPITOR) tablet 20 mg  20 mg Oral At Bedtime Angela Kaiser MD   20 mg at 08/17/24 2014    benzocaine-menthol (CHLORASEPTIC) 6-10 MG lozenge 1 lozenge  1 lozenge Buccal Q1H PRN Angela Kaiser MD        calcium carbonate (TUMS) chewable tablet 1,000 mg  1,000 mg Oral 4x Daily PRN Angela Kaiser MD        glucose gel 15-30 g  15-30 g Oral Q15 Min PRN Angela Kaiser MD        Or    dextrose 50 % injection 25-50 mL  25-50 mL Intravenous Q15 Min PRN Angela Kaiser MD        Or    glucagon injection 1 mg  1 mg Subcutaneous Q15 Min PRN Angela Kaiser MD        digoxin (LANOXIN) tablet 125 mcg  125 mcg Oral Daily Angela Kaiser MD   125 mcg at 08/18/24 0812    diltiazem ER COATED BEADS (CARDIZEM CD/CARTIA XT) 24 hr capsule 120 mg  120 mg Oral Daily Angela Kaiser MD   120 mg at 08/18/24 0812    fluconazole (DIFLUCAN) intermittent infusion 200 mg  200 mg Intravenous Q24H Angela Kaiser  mL/hr at 08/17/24 1930 200 mg at 08/17/24 1930    fluticasone-vilanterol (BREO ELLIPTA) 200-25 MCG/ACT inhaler 1 puff  1 puff Inhalation Daily Angela Kaiser MD   1 puff at 08/18/24 0818    [Held by provider] furosemide (LASIX) injection 20 mg  20 mg  Intravenous Q8H Angela Kaiser MD        gabapentin (NEURONTIN) capsule 600 mg  600 mg Oral TID Angela Kaiser MD   600 mg at 08/18/24 0808    insulin aspart (NovoLOG) injection (RAPID ACTING)  1-10 Units Subcutaneous TID Angela Barron MD   4 Units at 08/17/24 1710    insulin aspart (NovoLOG) injection (RAPID ACTING)  1-7 Units Subcutaneous At Bedtime Angela Kaiser MD   2 Units at 08/17/24 2202    ipratropium - albuterol 0.5 mg/2.5 mg/3 mL (DUONEB) neb solution 3 mL  3 mL Nebulization Q4H PRN Angela Kaiser MD        lidocaine (LMX4) cream   Topical Q1H PRN Angela Kaiser MD        lidocaine 1 % 0.1-1 mL  0.1-1 mL Other Q1H PRN Angela Kaiser MD        melatonin tablet 1 mg  1 mg Oral At Bedtime PRN Angela Kaiser MD        metoprolol tartrate (LOPRESSOR) tablet 50 mg  50 mg Oral BID Angela Kaiser MD   50 mg at 08/18/24 0813    miconazole (MICATIN) 2 % powder   Topical TID PRN Angela Kaiser MD        naloxone (NARCAN) injection 0.2 mg  0.2 mg Intravenous Q2 Min PRN Angela Kaiser MD        naloxone (NARCAN) injection 0.2 mg  0.2 mg Intramuscular Q2 Min PRAngela Copeland MD        naloxone (NARCAN) injection 0.4 mg  0.4 mg Intravenous Q2 Min PRAngela Copeland MD        naloxone (NARCAN) injection 0.4 mg  0.4 mg Intramuscular Q2 Min PRN Angela Kaiser MD        pantoprazole (PROTONIX) EC tablet 40 mg  40 mg Oral QAM AC Angela Kaiser MD   40 mg at 08/18/24 0645    piperacillin-tazobactam (ZOSYN) 3.375 g vial to attach to  mL bag  3.375 g Intravenous Q8H Angela Kaiser MD   3.375 g at 08/18/24 0813    polyethylene glycol (MIRALAX) Packet 17 g  17 g Oral Daily PRN Angela Kaiser MD        predniSONE (DELTASONE) tablet 30 mg  30 mg Oral Daily Angela Kiaser MD   30 mg at 08/18/24 0812    prochlorperazine (COMPAZINE) injection 5 mg  5 mg Intravenous Q6H PRN Angela Kaiser MD        Or    prochlorperazine (COMPAZINE) tablet 5 mg  5  mg Oral Q6H Angela Snowden MD        Or    prochlorperazine (COMPAZINE) suppository 12.5 mg  12.5 mg Rectal Q12H Angela Snowden MD        senna-docusate (SENOKOT-S/PERICOLACE) 8.6-50 MG per tablet 1 tablet  1 tablet Oral BID Angela Snowden MD        Or    senna-docusate (SENOKOT-S/PERICOLACE) 8.6-50 MG per tablet 2 tablet  2 tablet Oral BID Angela Snowden MD        sennosides (SENOKOT) tablet 1 tablet  1 tablet Oral BID Angela Kaiser MD   1 tablet at 08/18/24 0813    sodium chloride (PF) 0.9% PF flush 10-20 mL  10-20 mL Intracatheter q1 min Angela Snowden MD        sodium chloride (PF) 0.9% PF flush 10-40 mL  10-40 mL Intracatheter Q7 Days Angela Kaiser MD   10 mL at 08/17/24 1703    sodium chloride (PF) 0.9% PF flush 10-40 mL  10-40 mL Intracatheter Q1H Angela Snowden MD        sodium chloride (PF) 0.9% PF flush 3 mL  3 mL Intracatheter Q8H Angela Kaiser MD   3 mL at 08/18/24 0813    sodium chloride (PF) 0.9% PF flush 3 mL  3 mL Intracatheter q1 min Angela Snowden MD        sucralfate (CARAFATE) tablet 1 g  1 g Oral TID AC Angela Kaiser MD   1 g at 08/18/24 0645     Social History     Socioeconomic History    Marital status:      Spouse name: Not on file    Number of children: Not on file    Years of education: Not on file    Highest education level: Not on file   Occupational History    Not on file   Tobacco Use    Smoking status: Some Days     Current packs/day: 0.25     Types: Cigarettes     Passive exposure: Never    Smokeless tobacco: Never    Tobacco comments:     seen by TTS inpatient on 3/31/22   Vaping Use    Vaping status: Never Used   Substance and Sexual Activity    Alcohol use: Yes     Comment: Alcoholic Drinks/day: very little    Drug use: No    Sexual activity: Not on file   Other Topics Concern    Not on file   Social History Narrative    Not on file     Social Determinants of Health     Financial Resource Strain:  Low Risk  (12/26/2023)    Financial Resource Strain     Within the past 12 months, have you or your family members you live with been unable to get utilities (heat, electricity) when it was really needed?: No   Food Insecurity: Low Risk  (12/26/2023)    Food Insecurity     Within the past 12 months, did you worry that your food would run out before you got money to buy more?: No     Within the past 12 months, did the food you bought just not last and you didn t have money to get more?: No   Transportation Needs: Low Risk  (12/26/2023)    Transportation Needs     Within the past 12 months, has lack of transportation kept you from medical appointments, getting your medicines, non-medical meetings or appointments, work, or from getting things that you need?: No   Physical Activity: Not on file   Stress: Not on file   Social Connections: Not on file   Interpersonal Safety: Low Risk  (4/1/2024)    Interpersonal Safety     Do you feel physically and emotionally safe where you currently live?: Yes     Within the past 12 months, have you been hit, slapped, kicked or otherwise physically hurt by someone?: No     Within the past 12 months, have you been humiliated or emotionally abused in other ways by your partner or ex-partner?: No   Housing Stability: Low Risk  (12/26/2023)    Housing Stability     Do you have housing? : Yes     Are you worried about losing your housing?: No     Family History   Problem Relation Age of Onset    Heart Failure Mother     Cancer Other         paternal HX-laryngeal     Alcoholism Sister     No Known Problems Daughter     No Known Problems Maternal Grandmother     No Known Problems Maternal Grandfather     No Known Problems Paternal Grandmother     No Known Problems Paternal Grandfather     No Known Problems Maternal Aunt     No Known Problems Paternal Aunt     Alcoholism Sister     Alcoholism Brother     Alcoholism Father     Cancer Paternal Uncle         Gastric-Alcohol    Cancer Paternal  Uncle         gastric-Alcohol    Hereditary Breast and Ovarian Cancer Syndrome No family hx of     Breast Cancer No family hx of     Colon Cancer No family hx of     Endometrial Cancer No family hx of     Ovarian Cancer No family hx of      ROS  - Twelve point review of systems were discussed with patient, positive finding in HPI    Objective:   VITALS:  /56 (BP Location: Left arm)   Pulse 81   Temp 98  F (36.7  C) (Oral)   Resp 18   LMP  (LMP Unknown)   SpO2 90%   VENT:  Resp: 18    EXAM:   Gen: awake, alert, mild respiratory distress  HEENT: pale conjunctiva, dry mucosa, Mallampati II/IV  Neck: no thyromegaly, masses or JVD  Chest: right side chest tube  Lungs: decrease breath sounds right lung , discrete ronchi left lung  CV: irregular, no murmurs or gallops appreciated  Abdomen: soft, NT, BS wnl  Ext: no edema  Neuro: awake, alert, non focal       Data Review:  Recent Labs   Lab 08/18/24  0736 08/18/24  0704 08/17/24  2142 08/17/24  1704 08/17/24  1214 08/17/24  0756   * 143* 239* 228* 210* 114*     SPUTUM GRAM STAIN  <10 Squamous epithelial cells/low power field      >25 PMNs/low power field      3+ Gram positive cocci      08/13/24 05:13 08/14/24 05:12 08/15/24 05:49   Sodium 139     Potassium 4.4 4.3 4.1   Chloride 101     Carbon Dioxide (CO2) 33 (H)     Urea Nitrogen 26.6 (H)     Creatinine 0.68     GFR Estimate >90     Calcium 9.2     Anion Gap 5 (L)     Magnesium 2.5 (H) 2.4 (H) 2.3   Albumin 2.8 (L) 3.0 (L)    Protein Total 5.9 (L) 5.7 (L)    Alkaline Phosphatase 171 (H) 154 (H)     (H) 107 (H)    AST 54 (H) 26    Bilirubin Direct <0.20 <0.20    Bilirubin Total 0.3 0.3    Calcium Ionized Whole Blood 5.1        08/12/24 05:02 08/13/24 05:13   WBC 12.0 (H) 8.6   Hemoglobin 9.0 (L) 9.3 (L)   Hematocrit 31.0 (L) 31.9 (L)   Platelet Count 220 174   RBC Count 3.64 (L) 3.78 (L)   MCV 85 84   MCH 24.7 (L) 24.6 (L)   MCHC 29.0 (L) 29.2 (L)     Sputum Culture 8/10 2+ Streptococcus  pneumoniae Abnormal       1+ Escherichia coli Abnormal           BAL Culture 8/11 2+ Normal anna marie      1+ Escherichia coli Abnormal    Susceptibilities done on previous cultures        Pleural fluid Culture 8/10 1+ Streptococcus pneumoniae Abnormal          Gram Stain quantification of host cells and microbiological organisms was done on a cytocentrifuged preparation.          XR CHEST PORT 1 VIEW  LOCATION: [facility]  DATE: 8/10/2024   INDICATION: sob, hypoxic, history of prior ptx, eval for ptx, pna or edema  COMPARISON: 7/17/2020  IMPRESSION: Large right pleural effusion. No pneumothorax. Left subclavian approach pacing device. Prosthetic aortic valve. Cardiac silhouette within normal limits. No acute osseous abnormality.    XR CHEST 1 VIEW  LOCATION: [facility]  DATE: 08/11/2024   INDICATION: New chest tube placement.  COMPARISON: 08/10/2024 CXR and CT chest.  IMPRESSION: Interval placement right basilar pleural drainage catheter. Previously seen large right pleural effusion has been nearly completely evacuated and the mid and lower right lung have partially reexpanded. No pneumothorax. Left lung clear. Mild   cardiac enlargement. Transcatheter aortic valve replacement. Pacer anterior left chest wall with lead tips in the RA and RV. Right PICC tip RA/SVC junction.    CT CHEST W/O CONTRAST  LOCATION: [facility]  DATE: 8/13/2024  INDICATION: empyema s p chest tube, ?evacuated  COMPARISON: 8/10/2024  FINDINGS:   LUNGS AND PLEURA: Right basilar pigtail pleural drain in place. Large right pleural effusion with complete atelectasis of the right lung. A few foci of gas in the pleural space. Small left pleural effusion. Emphysema. Mild frothy secretions in trachea. Debris occludes the central right lung airways.  MEDIASTINUM/AXILLAE: Stable left subclavian approach pacemaker and TAVR. Right PICC with tip near the cavoatrial junction.  Rightward mediastinal shift. No lymphadenopathy.  CORONARY ARTERY CALCIFICATION: Moderate.  UPPER ABDOMEN: Unchanged thickening of the left adrenal gland.   MUSCULOSKELETAL: Unremarkable  IMPRESSION:   1.  Large right pleural effusion with right lung collapse.      XR VIDEO SWALLOW WITH SLP OR OT  LOCATION: [facility]  DATE: 8/14/2024    INDICATION: Difficulty swallowing.  COMPARISON: None.  FINDINGS:   Swallow study with Speech Pathology using multiple barium thicknesses.    Penetration with thin liquids, which slightly improved with chin tuck maneuver and decreased swallow volumes. No definite aspiration visualized with trialed consistencies.  IMPRESSION:  Penetration with thin liquids, however no aspiration visualized. Please see speech pathology report for further details and recommendations.    XR CHEST 1 VIEW  LOCATION: [facility]  DATE: 8/15/2024  INDICATION: Treated for empyema, s p chest tube , follow up pleural effusion  COMPARISON: CT chest 8/13/2024, chest radiograph 8/11/2024  IMPRESSION: Unchanged position of the pigtail chest tube in the right lung base with similar complete opacification of the right hemithorax. Trace left effusion with dependent atelectasis. Background emphysema. Cardiac silhouette and mediastinal contours are mostly obscured. Aortic valve replacement. Left chest cardiac generator and leads are unchanged. Right upper extremity PICC tip terminates in the mid SVC.     XR CHEST PORT 1 VIEW  LOCATION: [facility]  DATE: 8/18/2024  INDICATION: Recheck chest tube and pleural effusions  COMPARISON: 8/17/2024  IMPRESSION: No change since yesterday. Small caliber chest tube projected at the right lung base unchanged. Complete opacification of the right hemithorax with volume loss unchanged. Right PICC line tip in low SVC. Transvenous pacemaker. Hyperinflation left lung.    By:  Modesto Joseph MD, 08/18/2024   8:30 AM    Primary Care Physician:  Cammy Bui

## 2024-08-18 NOTE — PLAN OF CARE
Problem: Gas Exchange Impaired  Goal: Optimal Gas Exchange  Outcome: Progressing     Problem: Dysrhythmia  Goal: Normalized Cardiac Rhythm  Outcome: Progressing   Goal Outcome Evaluation:       VS stable.pt denies any SOB and chest pain.Oxygen saturation maintained well with 4l nasal canula.tele has been NSR. Chest tube has been intact and patent   chest tube atrium replaced due to being full and totally 1150 ml out put noted the last 12 hours.

## 2024-08-18 NOTE — PROGRESS NOTES
[facility]    Medicine Progress Note - Hospitalist Service    Date of Admission:  8/17/2024    Assessment & Plan      74-year-old female with a past medical history of a flutter, COPD, ongoing tobacco abuse, previous pleural effusions and chronic pain admitted to the hospital with empyema; transferred from [facility] to [facility] for surgical management.    Empyema  --- Failing management with chest tube and lytics, CT showing larger size 8/16  --- Chest x-ray today shows no change, chest tube in place with complete opacification of the right hemithorax with volume loss unchanged.  --- 8/17 transfer from [facility] to [facility] in anticipation of VATS procedure  --- General surgery and pulmonology following  --- Continue chest tube  --- Anticipating VATS tomorrow, continue to hold Eliquis  --- Underwent bronchoscopy 8/11 cultures showing E. coli and Candida albicans with pleural effusion cultures showing strep pneumo  --- ID following, changed antibiotics from ceftriaxone to Zosyn and fluconazole and recommended source control  --- Patient is at a moderate to high risk to undergo a moderate risk procedure.  Continue to optimize with scheduled nebs for 6 doses perioperatively given her chronic COPD.  Lab work has been stable.  --- Appreciate surgical conversations today.  Will place n.p.o. at midnight    Acute on chronic respiratory failure  Advanced COPD  Tobacco dependence  --- Has been on 5 L as opposed to usual 3 L at home  --- Has been on oral taper of steroids for her COPD  --- CTs per above  --- Pulmonology following    Paroxysmal A-fib/a flutter; status post PPM  Aortic stenosis status post TAVR  Acute on chronic HFpEF  --- Cardiology wants involved and patient had been on Cardizem drip.  --- Currently on metoprolol and diltiazem and digoxin with hold parameters  --- She is off amiodarone due to QTc prolongation  --- Have been holding her Lasix  --- Cardiology signed off  "8/15  --echo 4/2024 with EF 55-60%     Deconditioning   --- Previously recommended to go to TCU and has bed there.    Normocytic anemia; overall stable    Hyperlipidemia--statin ordered    Diabetes mellitus, type II  --- Not on home meds  --- Last A1c 6.2.  Will repeat  --- Blood sugars look okay  --- Continue insulin sliding scale          Diet: Fluid restriction 1800 ML FLUID  Moderate Consistent Carb (60 g CHO per Meal) Diet    DVT Prophylaxis: CDs with DOAC on hold  Chairez Catheter: Not present  Lines: PRESENT      PICC 08/10/24 Triple Lumen Right Brachial vein medial-Site Assessment: WDL      Cardiac Monitoring: ACTIVE order. Indication: Tachyarrhythmias, acute (48 hours)  Code Status: Full Code      Clinically Significant Risk Factors Present on Admission               # Drug Induced Coagulation Defect: home medication list includes an anticoagulant medication    # Hypertension: Noted on problem list    # Anemia: based on hgb <11       # Overweight: Estimated body mass index is 26.48 kg/m  as calculated from the following:    Height as of 8/10/24: 1.651 m (5' 5\").    Weight as of 8/16/24: 72.2 kg (159 lb 1.6 oz).         # Financial/Environmental Concerns:     # Pacemaker present             Disposition Plan     Medically Ready for Discharge: Anticipated in 5+ Days             Antonia Baptiste MD  Hospitalist Service  [facility]  Securely message with [employer] (more info)  Text page via [employer] Paging/Directory   ______________________________________________________________________    Interval History   --- rapid response yesterday reviewed  ---stable oxygen since  --- She feels drains are working better here than they were at [facility].  --- Understands risks with surgery but also failing conservative management.  --- Chest wall pain stable    Physical Exam   Vital Signs: Temp: 98  F (36.7  C) Temp src: Oral BP: 106/56 Pulse: 81   Resp: 18 SpO2: 90 % O2 Device: Nasal cannula Oxygen Delivery: 4 " LPM  Weight: 0 lbs 0 oz    General Appearance: Pleasant frail, NAD  Respiratory: Unchanged, quiet but course bs bilaterally  Cardiovascular: Regular rate and rhythm, no murmurs rubs or gallops  GI: Soft and nontender without hepatosplenomegaly  Skin: Frail skin with senile purpura and tattoos noted without open areas.  Other: Neuro grossly intact without focal deficits appreciated    Medical Decision Making             Data     I have personally reviewed the following data over the past 24 hrs:    N/A  \   N/A   / N/A     140 103 14.4 /  134 (H)   4.0 34 (H) 0.50 (L) \       Imaging results reviewed over the past 24 hrs:   Recent Results (from the past 24 hour(s))   XR Chest Port 1 View    Narrative    EXAM: XR CHEST PORT 1 VIEW  LOCATION: [facility]  DATE: 8/18/2024    INDICATION: Recheck chest tube and pleural effusions  COMPARISON: 8/17/2024      Impression    IMPRESSION: No change since yesterday. Small caliber chest tube projected at the right lung base unchanged. Complete opacification of the right hemithorax with volume loss unchanged. Right PICC line tip in low SVC. Transvenous pacemaker. Hyperinflation   left lung.

## 2024-08-18 NOTE — PLAN OF CARE
Problem: Adult Inpatient Plan of Care  Goal: Absence of Hospital-Acquired Illness or Injury  8/17/2024 2235 by Flor Bethea RN  Outcome: Progressing  8/17/2024 1622 by Flor Bethea RN  Outcome: Progressing       Problem: Gas Exchange Impaired  Goal: Optimal Gas Exchange  8/17/2024 2235 by Flor Bethea RN  Outcome: Progressing     Problem: Comorbidity Management  Goal: Blood Pressure in Desired Range  Outcome: Progressing  Intervention: Maintain Blood Pressure Management  Recent Flowsheet Documentation  Taken 8/17/2024 1545 by Flor Bethea RN  Medication Review/Management: medications reviewed     Problem: Comorbidity Management  Goal: Blood Glucose Levels Within Targeted Range  Outcome: Progressing  Intervention: Monitor and Manage Glycemia  Recent Flowsheet Documentation  Taken 8/17/2024 1545 by Flor Bethea RN  Medication Review/Management: medications reviewed     Goal Outcome Evaluation:  A/O x4, 02 3-4L NC to keep Sp02 >90%. Oximeter probe in forehead. Congested productive cough. Diminished breath sounds bilaterally. Self administers I.S- 500 ml. Right Chest tube dressings intact, drained 200 ml. PICC intact, IV Abx given.  SBP- 's. A Flutter, BBB. No pacemaker spikes seen. BG in 200's, sliding scale insulin given. BM today.  Repositioned in bed. Tolerated meds in apple sauce, chin tucked with liquids. HS sched Metoprolol held per parameters. Hospitalist updated.

## 2024-08-18 NOTE — PROGRESS NOTES
SW reviewed chart. Patient transferred from [facility]. Per initial assessment completed at [facility],  Pt resides in the home of her son and daughter-in-law Cynthia who is also pt's PCA. Family assists with I/ADL support both as family and as PCA as needed. No additional in-home services identified. Home O2 and ambulatory DME utilized.     Therapy recommendation at [facility] for Transitional care, Patient clinically accepted at [facility] (will need PAS, transport TBD).     CM following care progression to assist with safe discharge planning and follow up on team recommendations.       LUZ Hernandez at 9:20 AM on 08/18/24

## 2024-08-19 ENCOUNTER — APPOINTMENT (OUTPATIENT)
Dept: RADIOLOGY | Facility: HOSPITAL | Age: 74
DRG: 166 | End: 2024-08-19
Attending: EMERGENCY MEDICINE
Payer: COMMERCIAL

## 2024-08-19 LAB
ABO/RH(D): NORMAL
ANION GAP SERPL CALCULATED.3IONS-SCNC: 6 MMOL/L (ref 7–15)
ANTIBODY SCREEN: NEGATIVE
BUN SERPL-MCNC: 17.6 MG/DL (ref 8–23)
CALCIUM SERPL-MCNC: 8.3 MG/DL (ref 8.8–10.4)
CHLORIDE SERPL-SCNC: 107 MMOL/L (ref 98–107)
CREAT SERPL-MCNC: 0.55 MG/DL (ref 0.51–0.95)
EGFRCR SERPLBLD CKD-EPI 2021: >90 ML/MIN/1.73M2
ERYTHROCYTE [DISTWIDTH] IN BLOOD BY AUTOMATED COUNT: 17.5 % (ref 10–15)
GLUCOSE BLDC GLUCOMTR-MCNC: 126 MG/DL (ref 70–99)
GLUCOSE BLDC GLUCOMTR-MCNC: 138 MG/DL (ref 70–99)
GLUCOSE BLDC GLUCOMTR-MCNC: 150 MG/DL (ref 70–99)
GLUCOSE BLDC GLUCOMTR-MCNC: 159 MG/DL (ref 70–99)
GLUCOSE BLDC GLUCOMTR-MCNC: 199 MG/DL (ref 70–99)
GLUCOSE SERPL-MCNC: 127 MG/DL (ref 70–99)
HCO3 SERPL-SCNC: 31 MMOL/L (ref 22–29)
HCT VFR BLD AUTO: 36.8 % (ref 35–47)
HGB BLD-MCNC: 10.2 G/DL (ref 11.7–15.7)
MAGNESIUM SERPL-MCNC: 2.2 MG/DL (ref 1.7–2.3)
MCH RBC QN AUTO: 23.9 PG (ref 26.5–33)
MCHC RBC AUTO-ENTMCNC: 27.7 G/DL (ref 31.5–36.5)
MCV RBC AUTO: 86 FL (ref 78–100)
PLATELET # BLD AUTO: 274 10E3/UL (ref 150–450)
POTASSIUM SERPL-SCNC: 4.2 MMOL/L (ref 3.4–5.3)
RBC # BLD AUTO: 4.26 10E6/UL (ref 3.8–5.2)
SODIUM SERPL-SCNC: 144 MMOL/L (ref 135–145)
SPECIMEN EXPIRATION DATE: NORMAL
WBC # BLD AUTO: 10.4 10E3/UL (ref 4–11)

## 2024-08-19 PROCEDURE — 258N000003 HC RX IP 258 OP 636: Performed by: ANESTHESIOLOGY

## 2024-08-19 PROCEDURE — 88112 CYTOPATH CELL ENHANCE TECH: CPT | Mod: TC | Performed by: SPECIALIST

## 2024-08-19 PROCEDURE — 999N000156 HC STATISTIC RCP CONSULT EA 30 MIN

## 2024-08-19 PROCEDURE — 99232 SBSQ HOSP IP/OBS MODERATE 35: CPT | Performed by: FAMILY MEDICINE

## 2024-08-19 PROCEDURE — 250N000011 HC RX IP 250 OP 636: Performed by: ANESTHESIOLOGY

## 2024-08-19 PROCEDURE — 83735 ASSAY OF MAGNESIUM: CPT | Performed by: FAMILY MEDICINE

## 2024-08-19 PROCEDURE — 250N000011 HC RX IP 250 OP 636: Performed by: NURSE ANESTHETIST, CERTIFIED REGISTERED

## 2024-08-19 PROCEDURE — 120N000004 HC R&B MS OVERFLOW

## 2024-08-19 PROCEDURE — 32601 THORACOSCOPY DIAGNOSTIC: CPT | Performed by: ANESTHESIOLOGY

## 2024-08-19 PROCEDURE — 32601 THORACOSCOPY DIAGNOSTIC: CPT | Performed by: NURSE ANESTHETIST, CERTIFIED REGISTERED

## 2024-08-19 PROCEDURE — 258N000001 HC RX 258: Performed by: SPECIALIST

## 2024-08-19 PROCEDURE — 250N000025 HC SEVOFLURANE, PER MIN: Performed by: SPECIALIST

## 2024-08-19 PROCEDURE — 250N000011 HC RX IP 250 OP 636: Performed by: FAMILY MEDICINE

## 2024-08-19 PROCEDURE — 99100 ANES PT EXTEME AGE<1 YR&>70: CPT | Performed by: NURSE ANESTHETIST, CERTIFIED REGISTERED

## 2024-08-19 PROCEDURE — 250N000011 HC RX IP 250 OP 636: Performed by: SPECIALIST

## 2024-08-19 PROCEDURE — 370N000017 HC ANESTHESIA TECHNICAL FEE, PER MIN: Performed by: SPECIALIST

## 2024-08-19 PROCEDURE — 710N000009 HC RECOVERY PHASE 1, LEVEL 1, PER MIN: Performed by: SPECIALIST

## 2024-08-19 PROCEDURE — 250N000011 HC RX IP 250 OP 636: Performed by: EMERGENCY MEDICINE

## 2024-08-19 PROCEDURE — 250N000009 HC RX 250: Performed by: SPECIALIST

## 2024-08-19 PROCEDURE — 250N000009 HC RX 250: Performed by: FAMILY MEDICINE

## 2024-08-19 PROCEDURE — 85027 COMPLETE CBC AUTOMATED: CPT | Performed by: FAMILY MEDICINE

## 2024-08-19 PROCEDURE — 86901 BLOOD TYPING SEROLOGIC RH(D): CPT | Performed by: NURSE ANESTHETIST, CERTIFIED REGISTERED

## 2024-08-19 PROCEDURE — 250N000011 HC RX IP 250 OP 636: Performed by: SURGERY

## 2024-08-19 PROCEDURE — 258N000003 HC RX IP 258 OP 636: Performed by: NURSE ANESTHETIST, CERTIFIED REGISTERED

## 2024-08-19 PROCEDURE — P9045 ALBUMIN (HUMAN), 5%, 250 ML: HCPCS | Mod: JZ | Performed by: NURSE ANESTHETIST, CERTIFIED REGISTERED

## 2024-08-19 PROCEDURE — 250N000013 HC RX MED GY IP 250 OP 250 PS 637: Performed by: SPECIALIST

## 2024-08-19 PROCEDURE — 360N000077 HC SURGERY LEVEL 4, PER MIN: Performed by: SPECIALIST

## 2024-08-19 PROCEDURE — 94640 AIRWAY INHALATION TREATMENT: CPT | Mod: 76

## 2024-08-19 PROCEDURE — 258N000003 HC RX IP 258 OP 636: Performed by: SPECIALIST

## 2024-08-19 PROCEDURE — 250N000013 HC RX MED GY IP 250 OP 250 PS 637: Performed by: EMERGENCY MEDICINE

## 2024-08-19 PROCEDURE — 250N000011 HC RX IP 250 OP 636: Mod: JZ | Performed by: NURSE ANESTHETIST, CERTIFIED REGISTERED

## 2024-08-19 PROCEDURE — 250N000009 HC RX 250: Performed by: NURSE ANESTHETIST, CERTIFIED REGISTERED

## 2024-08-19 PROCEDURE — 999N000157 HC STATISTIC RCP TIME EA 10 MIN

## 2024-08-19 PROCEDURE — 80048 BASIC METABOLIC PNL TOTAL CA: CPT | Performed by: EMERGENCY MEDICINE

## 2024-08-19 PROCEDURE — 94640 AIRWAY INHALATION TREATMENT: CPT

## 2024-08-19 PROCEDURE — 36415 COLL VENOUS BLD VENIPUNCTURE: CPT | Performed by: NURSE ANESTHETIST, CERTIFIED REGISTERED

## 2024-08-19 PROCEDURE — 71045 X-RAY EXAM CHEST 1 VIEW: CPT

## 2024-08-19 PROCEDURE — 250N000013 HC RX MED GY IP 250 OP 250 PS 637: Performed by: ANESTHESIOLOGY

## 2024-08-19 PROCEDURE — 999N000141 HC STATISTIC PRE-PROCEDURE NURSING ASSESSMENT: Performed by: SPECIALIST

## 2024-08-19 PROCEDURE — 86900 BLOOD TYPING SEROLOGIC ABO: CPT | Performed by: NURSE ANESTHETIST, CERTIFIED REGISTERED

## 2024-08-19 PROCEDURE — 0W994ZZ DRAINAGE OF RIGHT PLEURAL CAVITY, PERCUTANEOUS ENDOSCOPIC APPROACH: ICD-10-PCS | Performed by: SPECIALIST

## 2024-08-19 PROCEDURE — 32607 THORACOSCOPY W/BX INFILTRATE: CPT | Mod: RT | Performed by: SPECIALIST

## 2024-08-19 PROCEDURE — 272N000001 HC OR GENERAL SUPPLY STERILE: Performed by: SPECIALIST

## 2024-08-19 RX ORDER — ACETAMINOPHEN 325 MG/1
975 TABLET ORAL EVERY 8 HOURS
Status: COMPLETED | OUTPATIENT
Start: 2024-08-19 | End: 2024-08-22

## 2024-08-19 RX ORDER — ONDANSETRON 2 MG/ML
4 INJECTION INTRAMUSCULAR; INTRAVENOUS EVERY 6 HOURS PRN
Status: DISCONTINUED | OUTPATIENT
Start: 2024-08-19 | End: 2024-09-09 | Stop reason: HOSPADM

## 2024-08-19 RX ORDER — BISACODYL 10 MG
10 SUPPOSITORY, RECTAL RECTAL DAILY PRN
Status: DISCONTINUED | OUTPATIENT
Start: 2024-08-22 | End: 2024-09-09 | Stop reason: HOSPADM

## 2024-08-19 RX ORDER — HYDROMORPHONE HCL IN WATER/PF 6 MG/30 ML
0.4 PATIENT CONTROLLED ANALGESIA SYRINGE INTRAVENOUS
Status: DISCONTINUED | OUTPATIENT
Start: 2024-08-19 | End: 2024-09-09 | Stop reason: HOSPADM

## 2024-08-19 RX ORDER — POLYETHYLENE GLYCOL 3350 17 G/17G
17 POWDER, FOR SOLUTION ORAL DAILY
Status: DISCONTINUED | OUTPATIENT
Start: 2024-08-20 | End: 2024-09-09 | Stop reason: HOSPADM

## 2024-08-19 RX ORDER — DIPHENHYDRAMINE HCL 12.5 MG/5ML
12.5 SOLUTION ORAL EVERY 6 HOURS PRN
Status: DISCONTINUED | OUTPATIENT
Start: 2024-08-19 | End: 2024-08-19 | Stop reason: HOSPADM

## 2024-08-19 RX ORDER — FENTANYL CITRATE 50 UG/ML
INJECTION, SOLUTION INTRAMUSCULAR; INTRAVENOUS PRN
Status: DISCONTINUED | OUTPATIENT
Start: 2024-08-19 | End: 2024-08-19

## 2024-08-19 RX ORDER — NALOXONE HYDROCHLORIDE 1 MG/ML
0.1 INJECTION INTRAMUSCULAR; INTRAVENOUS; SUBCUTANEOUS
Status: DISCONTINUED | OUTPATIENT
Start: 2024-08-19 | End: 2024-08-19 | Stop reason: HOSPADM

## 2024-08-19 RX ORDER — ENOXAPARIN SODIUM 100 MG/ML
40 INJECTION SUBCUTANEOUS EVERY 24 HOURS
Status: DISCONTINUED | OUTPATIENT
Start: 2024-08-20 | End: 2024-08-22

## 2024-08-19 RX ORDER — AMOXICILLIN 250 MG
1 CAPSULE ORAL 2 TIMES DAILY
Status: DISCONTINUED | OUTPATIENT
Start: 2024-08-19 | End: 2024-09-09 | Stop reason: HOSPADM

## 2024-08-19 RX ORDER — SODIUM CHLORIDE 9 MG/ML
INJECTION, SOLUTION INTRAVENOUS CONTINUOUS PRN
Status: DISCONTINUED | OUTPATIENT
Start: 2024-08-19 | End: 2024-08-19

## 2024-08-19 RX ORDER — BUPIVACAINE HYDROCHLORIDE 2.5 MG/ML
INJECTION, SOLUTION INFILTRATION; PERINEURAL PRN
Status: DISCONTINUED | OUTPATIENT
Start: 2024-08-19 | End: 2024-08-19 | Stop reason: HOSPADM

## 2024-08-19 RX ORDER — DEXAMETHASONE SODIUM PHOSPHATE 4 MG/ML
4 INJECTION, SOLUTION INTRA-ARTICULAR; INTRALESIONAL; INTRAMUSCULAR; INTRAVENOUS; SOFT TISSUE
Status: DISCONTINUED | OUTPATIENT
Start: 2024-08-19 | End: 2024-08-19 | Stop reason: HOSPADM

## 2024-08-19 RX ORDER — SODIUM CHLORIDE, SODIUM LACTATE, POTASSIUM CHLORIDE, CALCIUM CHLORIDE 600; 310; 30; 20 MG/100ML; MG/100ML; MG/100ML; MG/100ML
INJECTION, SOLUTION INTRAVENOUS CONTINUOUS
Status: DISCONTINUED | OUTPATIENT
Start: 2024-08-19 | End: 2024-08-19 | Stop reason: HOSPADM

## 2024-08-19 RX ORDER — PROPOFOL 10 MG/ML
INJECTION, EMULSION INTRAVENOUS PRN
Status: DISCONTINUED | OUTPATIENT
Start: 2024-08-19 | End: 2024-08-19

## 2024-08-19 RX ORDER — ONDANSETRON 4 MG/1
4 TABLET, ORALLY DISINTEGRATING ORAL EVERY 30 MIN PRN
Status: DISCONTINUED | OUTPATIENT
Start: 2024-08-19 | End: 2024-08-19 | Stop reason: HOSPADM

## 2024-08-19 RX ORDER — ONDANSETRON 2 MG/ML
4 INJECTION INTRAMUSCULAR; INTRAVENOUS EVERY 30 MIN PRN
Status: DISCONTINUED | OUTPATIENT
Start: 2024-08-19 | End: 2024-08-19 | Stop reason: HOSPADM

## 2024-08-19 RX ORDER — FENTANYL CITRATE 50 UG/ML
50 INJECTION, SOLUTION INTRAMUSCULAR; INTRAVENOUS EVERY 5 MIN PRN
Status: DISCONTINUED | OUTPATIENT
Start: 2024-08-19 | End: 2024-08-19 | Stop reason: HOSPADM

## 2024-08-19 RX ORDER — SODIUM CHLORIDE 9 MG/ML
INJECTION, SOLUTION INTRAVENOUS CONTINUOUS
Status: DISCONTINUED | OUTPATIENT
Start: 2024-08-19 | End: 2024-08-21

## 2024-08-19 RX ORDER — DIAZEPAM 10 MG/2ML
2.5 INJECTION, SOLUTION INTRAMUSCULAR; INTRAVENOUS
Status: DISCONTINUED | OUTPATIENT
Start: 2024-08-19 | End: 2024-08-19 | Stop reason: HOSPADM

## 2024-08-19 RX ORDER — GABAPENTIN 300 MG/1
600 CAPSULE ORAL 3 TIMES DAILY
Status: CANCELLED | OUTPATIENT
Start: 2024-08-19

## 2024-08-19 RX ORDER — TRAMADOL HYDROCHLORIDE 50 MG/1
50 TABLET ORAL EVERY 6 HOURS PRN
Status: DISCONTINUED | OUTPATIENT
Start: 2024-08-19 | End: 2024-09-09 | Stop reason: HOSPADM

## 2024-08-19 RX ORDER — HYDROMORPHONE HCL IN WATER/PF 6 MG/30 ML
0.2 PATIENT CONTROLLED ANALGESIA SYRINGE INTRAVENOUS
Status: DISCONTINUED | OUTPATIENT
Start: 2024-08-19 | End: 2024-09-09 | Stop reason: HOSPADM

## 2024-08-19 RX ORDER — DIPHENHYDRAMINE HYDROCHLORIDE 50 MG/ML
12.5 INJECTION INTRAMUSCULAR; INTRAVENOUS EVERY 6 HOURS PRN
Status: DISCONTINUED | OUTPATIENT
Start: 2024-08-19 | End: 2024-08-19 | Stop reason: HOSPADM

## 2024-08-19 RX ORDER — ONDANSETRON 4 MG/1
4 TABLET, ORALLY DISINTEGRATING ORAL EVERY 6 HOURS PRN
Status: DISCONTINUED | OUTPATIENT
Start: 2024-08-19 | End: 2024-09-09 | Stop reason: HOSPADM

## 2024-08-19 RX ORDER — LIDOCAINE HYDROCHLORIDE 10 MG/ML
INJECTION, SOLUTION INFILTRATION; PERINEURAL PRN
Status: DISCONTINUED | OUTPATIENT
Start: 2024-08-19 | End: 2024-08-19

## 2024-08-19 RX ORDER — PROCHLORPERAZINE MALEATE 5 MG
5 TABLET ORAL EVERY 6 HOURS PRN
Status: DISCONTINUED | OUTPATIENT
Start: 2024-08-19 | End: 2024-08-24

## 2024-08-19 RX ORDER — FENTANYL CITRATE 50 UG/ML
25 INJECTION, SOLUTION INTRAMUSCULAR; INTRAVENOUS EVERY 5 MIN PRN
Status: DISCONTINUED | OUTPATIENT
Start: 2024-08-19 | End: 2024-08-19 | Stop reason: HOSPADM

## 2024-08-19 RX ORDER — SODIUM CHLORIDE, SODIUM LACTATE, POTASSIUM CHLORIDE, CALCIUM CHLORIDE 600; 310; 30; 20 MG/100ML; MG/100ML; MG/100ML; MG/100ML
INJECTION, SOLUTION INTRAVENOUS CONTINUOUS PRN
Status: DISCONTINUED | OUTPATIENT
Start: 2024-08-19 | End: 2024-08-19

## 2024-08-19 RX ORDER — PIPERACILLIN SODIUM, TAZOBACTAM SODIUM 3; .375 G/15ML; G/15ML
3.38 INJECTION, POWDER, LYOPHILIZED, FOR SOLUTION INTRAVENOUS EVERY 8 HOURS
Status: DISCONTINUED | OUTPATIENT
Start: 2024-08-19 | End: 2024-08-26

## 2024-08-19 RX ORDER — ACETAMINOPHEN 325 MG/1
650 TABLET ORAL EVERY 4 HOURS PRN
Status: DISCONTINUED | OUTPATIENT
Start: 2024-08-22 | End: 2024-08-24

## 2024-08-19 RX ORDER — ONDANSETRON 2 MG/ML
INJECTION INTRAMUSCULAR; INTRAVENOUS PRN
Status: DISCONTINUED | OUTPATIENT
Start: 2024-08-19 | End: 2024-08-19

## 2024-08-19 RX ORDER — LIDOCAINE 40 MG/G
CREAM TOPICAL
Status: DISCONTINUED | OUTPATIENT
Start: 2024-08-19 | End: 2024-08-19

## 2024-08-19 RX ORDER — KETAMINE HYDROCHLORIDE 10 MG/ML
INJECTION INTRAMUSCULAR; INTRAVENOUS PRN
Status: DISCONTINUED | OUTPATIENT
Start: 2024-08-19 | End: 2024-08-19

## 2024-08-19 RX ADMIN — DILTIAZEM HYDROCHLORIDE 120 MG: 120 CAPSULE, EXTENDED RELEASE ORAL at 20:32

## 2024-08-19 RX ADMIN — FENTANYL CITRATE 25 MCG: 50 INJECTION, SOLUTION INTRAMUSCULAR; INTRAVENOUS at 16:06

## 2024-08-19 RX ADMIN — METOPROLOL TARTRATE 50 MG: 25 TABLET, FILM COATED ORAL at 22:56

## 2024-08-19 RX ADMIN — SODIUM CHLORIDE: 9 INJECTION, SOLUTION INTRAVENOUS at 14:12

## 2024-08-19 RX ADMIN — ROCURONIUM BROMIDE 50 MG: 50 INJECTION, SOLUTION INTRAVENOUS at 14:24

## 2024-08-19 RX ADMIN — HYDROCORTISONE SODIUM SUCCINATE 50 MG: 100 INJECTION, POWDER, FOR SOLUTION INTRAMUSCULAR; INTRAVENOUS at 09:07

## 2024-08-19 RX ADMIN — PHENYLEPHRINE HYDROCHLORIDE 100 MCG: 10 INJECTION INTRAVENOUS at 14:28

## 2024-08-19 RX ADMIN — PHENYLEPHRINE HYDROCHLORIDE 1 MCG/KG/MIN: 10 INJECTION INTRAVENOUS at 14:28

## 2024-08-19 RX ADMIN — IPRATROPIUM BROMIDE AND ALBUTEROL SULFATE 3 ML: .5; 3 SOLUTION RESPIRATORY (INHALATION) at 20:57

## 2024-08-19 RX ADMIN — FLUCONAZOLE 200 MG: 2 INJECTION, SOLUTION INTRAVENOUS at 20:25

## 2024-08-19 RX ADMIN — Medication 1 SPRAY: at 20:40

## 2024-08-19 RX ADMIN — PROPOFOL 70 MG: 10 INJECTION, EMULSION INTRAVENOUS at 14:23

## 2024-08-19 RX ADMIN — PIPERACILLIN AND TAZOBACTAM 3.38 G: 3; .375 INJECTION, POWDER, FOR SOLUTION INTRAVENOUS at 07:50

## 2024-08-19 RX ADMIN — HYDROCORTISONE SODIUM SUCCINATE 25 MG: 100 INJECTION, POWDER, FOR SOLUTION INTRAMUSCULAR; INTRAVENOUS at 20:40

## 2024-08-19 RX ADMIN — PANTOPRAZOLE SODIUM 40 MG: 40 TABLET, DELAYED RELEASE ORAL at 06:09

## 2024-08-19 RX ADMIN — SODIUM CHLORIDE, POTASSIUM CHLORIDE, SODIUM LACTATE AND CALCIUM CHLORIDE: 600; 310; 30; 20 INJECTION, SOLUTION INTRAVENOUS at 06:08

## 2024-08-19 RX ADMIN — SUCRALFATE 1 G: 1 TABLET ORAL at 06:10

## 2024-08-19 RX ADMIN — ALBUMIN HUMAN: 0.05 INJECTION, SOLUTION INTRAVENOUS at 14:46

## 2024-08-19 RX ADMIN — ATORVASTATIN CALCIUM 20 MG: 10 TABLET, FILM COATED ORAL at 22:56

## 2024-08-19 RX ADMIN — SODIUM CHLORIDE: 9 INJECTION, SOLUTION INTRAVENOUS at 18:12

## 2024-08-19 RX ADMIN — FLUTICASONE FUROATE AND VILANTEROL TRIFENATATE 1 PUFF: 200; 25 POWDER RESPIRATORY (INHALATION) at 08:56

## 2024-08-19 RX ADMIN — FENTANYL CITRATE 50 MCG: 50 INJECTION, SOLUTION INTRAMUSCULAR; INTRAVENOUS at 16:22

## 2024-08-19 RX ADMIN — ONDANSETRON 4 MG: 2 INJECTION INTRAMUSCULAR; INTRAVENOUS at 17:04

## 2024-08-19 RX ADMIN — PROPOFOL 20 MG: 10 INJECTION, EMULSION INTRAVENOUS at 15:24

## 2024-08-19 RX ADMIN — ACETAMINOPHEN 975 MG: 325 TABLET ORAL at 20:33

## 2024-08-19 RX ADMIN — GABAPENTIN 600 MG: 300 CAPSULE ORAL at 22:57

## 2024-08-19 RX ADMIN — Medication 200 MG: at 15:22

## 2024-08-19 RX ADMIN — ONDANSETRON 4 MG: 2 INJECTION INTRAMUSCULAR; INTRAVENOUS at 15:19

## 2024-08-19 RX ADMIN — ACETAMINOPHEN 975 MG: 325 TABLET ORAL at 06:09

## 2024-08-19 RX ADMIN — LIDOCAINE HYDROCHLORIDE 5 ML: 10 INJECTION, SOLUTION INFILTRATION; PERINEURAL at 14:23

## 2024-08-19 RX ADMIN — DIGOXIN 125 MCG: 125 TABLET ORAL at 20:31

## 2024-08-19 RX ADMIN — SODIUM CHLORIDE, POTASSIUM CHLORIDE, SODIUM LACTATE AND CALCIUM CHLORIDE: 600; 310; 30; 20 INJECTION, SOLUTION INTRAVENOUS at 14:12

## 2024-08-19 RX ADMIN — METOPROLOL TARTRATE 50 MG: 25 TABLET, FILM COATED ORAL at 08:57

## 2024-08-19 RX ADMIN — Medication 200 MG: at 15:27

## 2024-08-19 RX ADMIN — Medication 2 G: at 14:17

## 2024-08-19 RX ADMIN — KETAMINE HYDROCHLORIDE 50 MG: 10 INJECTION INTRAMUSCULAR; INTRAVENOUS at 14:23

## 2024-08-19 RX ADMIN — IPRATROPIUM BROMIDE AND ALBUTEROL SULFATE 3 ML: .5; 3 SOLUTION RESPIRATORY (INHALATION) at 11:13

## 2024-08-19 RX ADMIN — PIPERACILLIN AND TAZOBACTAM 3.38 G: 3; .375 INJECTION, POWDER, FOR SOLUTION INTRAVENOUS at 20:42

## 2024-08-19 RX ADMIN — IPRATROPIUM BROMIDE AND ALBUTEROL SULFATE 3 ML: .5; 3 SOLUTION RESPIRATORY (INHALATION) at 07:07

## 2024-08-19 RX ADMIN — FENTANYL CITRATE 25 MCG: 50 INJECTION INTRAMUSCULAR; INTRAVENOUS at 14:53

## 2024-08-19 RX ADMIN — ROCURONIUM BROMIDE 20 MG: 50 INJECTION, SOLUTION INTRAVENOUS at 14:53

## 2024-08-19 ASSESSMENT — ACTIVITIES OF DAILY LIVING (ADL)
ADLS_ACUITY_SCORE: 50
ADLS_ACUITY_SCORE: 50
ADLS_ACUITY_SCORE: 42
ADLS_ACUITY_SCORE: 50
DEPENDENT_IADLS:: CLEANING;COOKING;LAUNDRY;SHOPPING;MEAL PREPARATION;MEDICATION MANAGEMENT;MONEY MANAGEMENT;TRANSPORTATION
ADLS_ACUITY_SCORE: 50
ADLS_ACUITY_SCORE: 42
ADLS_ACUITY_SCORE: 42
ADLS_ACUITY_SCORE: 51
ADLS_ACUITY_SCORE: 50
ADLS_ACUITY_SCORE: 41
ADLS_ACUITY_SCORE: 42
ADLS_ACUITY_SCORE: 41
ADLS_ACUITY_SCORE: 42
ADLS_ACUITY_SCORE: 42
ADLS_ACUITY_SCORE: 50
ADLS_ACUITY_SCORE: 50
ADLS_ACUITY_SCORE: 42
ADLS_ACUITY_SCORE: 42
ADLS_ACUITY_SCORE: 50

## 2024-08-19 ASSESSMENT — COPD QUESTIONNAIRES: COPD: 1

## 2024-08-19 NOTE — ANESTHESIA PREPROCEDURE EVALUATION
Anesthesia Pre-Procedure Evaluation    Patient: Mary Kay Tejada   MRN: [id number] : 1950        Procedure : Procedure(s):  VIDEO-ASSISTED THORACOSCOPIC SURGERY (VATS)          Past Medical History:   Diagnosis Date    Anemia     Aortic stenosis     Aortic valve disorder     Atrial fibrillation (H)     Atrial flutter (H)     Benign neoplasm of adenomatous polyp     large intestine     Chronic constipation     Chronic heart failure with preserved ejection fraction (H) 2024    Chronic pain syndrome     Congestive heart failure (H)     COPD (chronic obstructive pulmonary disease) (H)     Oxygen at night     Dependence on supplemental oxygen     Oxygen at noc, during the day as needed    Depression     Diabetes mellitus (H)     Dry eye syndrome     Fibromyalgia     Ganglion     left wrist    GERD (gastroesophageal reflux disease)     Hyperlipidemia     Hypertension     Hypokalemia     Infective otitis externa, unspecified     Created by Conversion     Larynx edema     Lung disease     Malignant neoplasm of vulva (H)     Created by Conversion [facility] Annotation: 2007  8:24AM - Cammy Bui:  resection per Dr. Alfonso Mane ;  Replacement Utility updated for latest IMO load    Medial epicondylitis     Onychomycosis     Osteoarthritis     Peptic ulcer     Polyneuropathy     Vulvar malignant neoplasm (H)       Past Surgical History:   Procedure Laterality Date    BIOPSY BREAST Right     BIOPSY BREAST Right 2015    BIOPSY BREAST Right 2015    Procedure: RIGHT BREAST BIOPSY AFTER WIRE LOCALIZATION AT 0940;  Surgeon: Renée Soriano MD;  Location: [facility];  Service:     BIOPSY OF BREAST, INCISIONAL      Description: Incisional Breast Biopsy;  Recorded: 2007;  Comments: benign    COLONOSCOPY N/A 2019    Procedure: COLONOSCOPY;  Surgeon: Eduardo Mora MD;  Location: [facility] [place];  Service: Gastroenterology    CV CORONARY ANGIOGRAM N/A  02/08/2024    Procedure: CV CORONARY ANGIOGRAM;  Surgeon: Moises Valencia MD;  Location: [facility] CV    CV LEFT HEART CATH N/A 02/08/2024    Procedure: Left Heart Catheterization;  Surgeon: Moises Valencia MD;  Location: [facility] LAB CV    CV RIGHT HEART CATH MEASUREMENTS RECORDED N/A 02/08/2024    Procedure: Right Heart Catheterization;  Surgeon: Moises Valencia MD;  Location: [facility] CV    CV TRANSCATHETER AORTIC VALVE REPLACEMENT-FEMORAL APPROACH N/A 02/20/2024    Procedure: Transcatheter Aortic Valve Replacement, possible cardiopulmonary bypass, possible surgical intervention;  Surgeon: Moises Valencia MD;  Location: [facility] CV    EP PACEMAKER DEVICE & IMPLANT- HIS LEAD DUAL N/A 3/7/2024    Procedure: Pacemaker Device & Lead Implant - HIS Lead Dual;  Surgeon: Donnell Leon MD;  Location: [facility] CV    ESOPHAGOSCOPY, GASTROSCOPY, DUODENOSCOPY (EGD), COMBINED N/A 11/06/2018    Procedure: ESOPHAGOGASTRODUODENOSCOPY;  Surgeon: Lit Fernando MD;  Location: [facility];  Service:     HYSTERECTOMY      JOINT REPLACEMENT Left     TKA    OR TRANSCATHETER AORTIC VALVE REPLACEMENT, FEMORAL PERCUTANEOUS APPROACH (STANDBY) N/A 02/20/2024    Procedure: OR TRANSCATHETER AORTIC VALVE REPLACEMENT, FEMORAL PERCUTANEOUS APPROACH (STANDBY);  Surgeon: Ishmael Farias MD;  Location: [facility] CV    PICC TRIPLE LUMEN PLACEMENT  01/12/2023         PICC TRIPLE LUMEN PLACEMENT  8/10/2024    CA ABLATE HEART DYSRHYTHM FOCUS      Description: Catheter Ablation Atrial Fibrillation;  Recorded: 07/31/2012;  Comments: 7/24/12 PVI with Dr. Gardiner and nilay to all 5 pulm veins and CTI fl ablation line as well.    TRANSCATHETER AORTIC-VALVE REPLACEMENT      ZZC SUPRACERV ABD HYSTERECTOMY      Description: Supracervical Hysterectomy;  Proc Date: 01/01/1985;  Comments: some cervix left!; ovaries intact; done for bleeding      Allergies   Allergen Reactions  "   Celebrex [Celecoxib] Rash     patient had butterfly rash - \"lupus-like\"      Latex Rash      Social History     Tobacco Use    Smoking status: Some Days     Current packs/day: 0.25     Types: Cigarettes     Passive exposure: Never    Smokeless tobacco: Never    Tobacco comments:     seen by TTS inpatient on 3/31/22   Substance Use Topics    Alcohol use: Yes     Comment: Alcoholic Drinks/day: very little      Wt Readings from Last 1 Encounters:   08/19/24 70.3 kg (154 lb 15.7 oz)        Anesthesia Evaluation   Pt has had prior anesthetic.         ROS/MED HX  ENT/Pulmonary:     (+)                          COPD,              Neurologic:       Cardiovascular:     (+)  hypertension- -   -  - -      CHF       fainting (syncope).                         METS/Exercise Tolerance:     Hematologic:       Musculoskeletal:       GI/Hepatic:     (+) GERD,                   Renal/Genitourinary:       Endo:     (+) type I DM,              Obesity,       Psychiatric/Substance Use:       Infectious Disease:       Malignancy:       Other:            Physical Exam    Airway        Mallampati: II    Neck ROM: full     Respiratory Devices and Support         Dental       (+) Minor Abnormalities - some fillings, tiny chips      Cardiovascular   cardiovascular exam normal          Pulmonary   pulmonary exam normal                OUTSIDE LABS:  CBC:   Lab Results   Component Value Date    WBC 10.4 08/19/2024    WBC 8.2 08/16/2024    HGB 10.2 (L) 08/19/2024    HGB 9.8 (L) 08/17/2024    HCT 36.8 08/19/2024    HCT 36.6 08/16/2024     08/19/2024     08/16/2024     BMP:   Lab Results   Component Value Date     08/19/2024     08/18/2024    POTASSIUM 4.2 08/19/2024    POTASSIUM 4.0 08/18/2024    CHLORIDE 107 08/19/2024    CHLORIDE 103 08/18/2024    CO2 31 (H) 08/19/2024    CO2 34 (H) 08/18/2024    BUN 17.6 08/19/2024    BUN 14.4 08/18/2024    CR 0.55 08/19/2024    CR 0.50 (L) 08/18/2024     (H) 08/19/2024    " " (H) 08/19/2024     COAGS:   Lab Results   Component Value Date    PTT 38 07/12/2024    INR 1.27 (H) 07/13/2024     POC: No results found for: \"BGM\", \"HCG\", \"HCGS\"  HEPATIC:   Lab Results   Component Value Date    ALBUMIN 2.2 (L) 08/18/2024    PROTTOTAL 5.2 (L) 08/18/2024    ALT 31 08/18/2024    AST 20 08/18/2024    ALKPHOS 105 08/18/2024    BILITOTAL 0.4 08/18/2024     OTHER:   Lab Results   Component Value Date    PH 7.36 02/08/2024    LACT 1.7 08/10/2024    A1C  08/18/2024      Comment:      Disregard results.   This is a corrected result. Previous result was 7.1 % on 8/18/2024 at  6:17 AM CDT    JOEY 8.3 (L) 08/19/2024    PHOS 3.6 07/14/2024    MAG 2.2 08/19/2024    LIPASE 12 (L) 02/06/2024    TSH 2.80 08/10/2024    CRP 0.5 01/12/2023       Anesthesia Plan    ASA Status:  3       Anesthesia Type: General.     - Airway: ETT              Consents    Anesthesia Plan(s) and associated risks, benefits, and realistic alternatives discussed. Questions answered and patient/representative(s) expressed understanding.     - Discussed: Risks, Benefits and Alternatives for the PROCEDURE were discussed     - Discussed with:  Patient      - Extended Intubation/Ventilatory Support Discussed: No.      - Patient is DNR/DNI Status: No     Use of blood products discussed: No .     Postoperative Care    Pain management: Multi-modal analgesia.   PONV prophylaxis: Ondansetron (or other 5HT-3), Dexamethasone or Solumedrol     Comments:               Sarah Shafer MD    I have reviewed the pertinent notes and labs in the chart from the past 30 days and (re)examined the patient.  Any updates or changes from those notes are reflected in this note.             "

## 2024-08-19 NOTE — ANESTHESIA CARE TRANSFER NOTE
Patient: Mary Kay Tejada    Procedure: Procedure(s):  VIDEO-ASSISTED RIGHT THORACOSCOPIC SURGERY (VATS) WITH RIGHT PLEURAL FLUID CYTOLOGY       Diagnosis: Empyema (H) [J86.9]  Diagnosis Additional Information: No value filed.    Anesthesia Type:   General     Note:    Oropharynx: oropharynx clear of all foreign objects and spontaneously breathing  Level of Consciousness: drowsy  Oxygen Supplementation: face mask  Level of Supplemental Oxygen (L/min / FiO2): 8  Independent Airway: airway patency satisfactory and stable  Dentition: dentition unchanged  Vital Signs Stable: post-procedure vital signs reviewed and stable  Report to RN Given: handoff report given  Patient transferred to: PACU  Comments: KONG, follows commands  Handoff Report: Identifed the Patient, Identified the Reponsible Provider, Reviewed the pertinent medical history, Discussed the surgical course, Reviewed Intra-OP anesthesia mangement and issues during anesthesia, Set expectations for post-procedure period and Allowed opportunity for questions and acknowledgement of understanding      Vitals:  Vitals Value Taken Time   /63 08/19/24 1555   Temp 36  C (96.8  F) 08/19/24 1600   Pulse 86 08/19/24 1600   Resp 23 08/19/24 1600   SpO2 95 % 08/19/24 1600   Vitals shown include unfiled device data.    Electronically Signed By: RAUDEL Bills CRNA  August 19, 2024  4:02 PM

## 2024-08-19 NOTE — ANESTHESIA POSTPROCEDURE EVALUATION
Patient: Mary Kay Tejada    Procedure: Procedure(s):  VIDEO-ASSISTED RIGHT THORACOSCOPIC SURGERY (VATS) WITH RIGHT PLEURAL FLUID CYTOLOGY       Anesthesia Type:  General    Note:  Disposition: Inpatient   Postop Pain Control: Uneventful            Sign Out: Well controlled pain   PONV: No   Neuro/Psych: Uneventful            Sign Out: Acceptable/Baseline neuro status   Airway/Respiratory: Uneventful            Sign Out: Acceptable/Baseline resp. status   CV/Hemodynamics: Uneventful            Sign Out: Acceptable CV status; No obvious hypovolemia; No obvious fluid overload   Other NRE: NONE   DID A NON-ROUTINE EVENT OCCUR? No           Last vitals:  Vitals Value Taken Time   /64 08/19/24 1715   Temp 36  C (96.8  F) 08/19/24 1724   Pulse 110 08/19/24 1719   Resp 18 08/19/24 1719   SpO2 94 % 08/19/24 1729   Vitals shown include unfiled device data.    Electronically Signed By: Kt Ruiz MD  August 19, 2024  6:09 PM

## 2024-08-19 NOTE — PROGRESS NOTES
Plan for surgery this morning.  Unfortunately she was given applesauce by her nurse at 6 AM.  Surgery now on hold until 2 this afternoon.    I had a long talk myself with the patient about her risk for surgery.  Although she has heard it multiple times she seems a little bit taken aback again.  Her family were with her today and I made sure they understood the risk.  The surgery may not give her much benefit.  If while I am there I think she would best be served by a thoracotomy and decortication, I will plan to proceed with that.  She understands.

## 2024-08-19 NOTE — PROGRESS NOTES
[facility]    Medicine Progress Note - Hospitalist Service    Date of Admission:  8/17/2024    Assessment & Plan      74-year-old female with a past medical history of a flutter, COPD, ongoing tobacco abuse, previous pleural effusions and chronic pain admitted to the hospital with empyema; transferred from [facility] to [facility] for surgical management.    Empyema  --- Failing management with chest tube and lytics, CT showing larger size 8/16  --- Chest x-ray continues to show no change in opacification of the right hemithorax with volume loss   --- 8/17 transfer from [facility] to [facility] in anticipation of VATS procedure  --- General surgery and pulmonology following  --- Continue chest tube  --- VATS today  --- Underwent bronchoscopy 8/11 cultures showing E. coli and Candida albicans with pleural effusion cultures showing strep pneumo  --- ID following, changed antibiotics from ceftriaxone to Zosyn and fluconazole and recommended source control  --- Patient is at a moderate to high risk to undergo a moderate risk procedure.  Continue to optimize with scheduled nebs for 6 doses perioperatively given her chronic COPD.  Lab work has been stable.  ---stress dose steroids started today  --- Has been off Eliquis  --- Oxygen needs stable  --- Had SLP eval including video swallow when at [facility].  Note reviewed from 8/14.  I am going to post them to follow while here    Acute on chronic respiratory failure  Advanced COPD  Tobacco dependence  --- Has been on 5 L as opposed to usual 3 L at home  --- Has been on oral taper of steroids for her COPD; stress dose steroids started per above  --- CTs per above  --- Pulmonology following    Paroxysmal A-fib/a flutter; status post PPM  Aortic stenosis status post TAVR  Acute on chronic HFpEF  --- Previously  on Cardizem drip.  --- Currently on metoprolol and diltiazem and digoxin with hold parameters  --- She is off amiodarone due to QTc  "prolongation  --- Have been holding her Lasix  --- Cardiology signed off 8/15  --echo 4/2024 with EF 55-60%   --- In general takes meds with applesauce so we will delay Cardizem and diltiazem until she is postop.  Heart rate has been normal  --- monitor on telemetry postop  --- Check mag today  --- Gave beta-blocker preoperatively with small amount of thickened liquid    Deconditioning   --- Previously recommended to go to TCU and has bed there.    Normocytic anemia; overall stable    Hyperlipidemia--statin ordered    Diabetes mellitus, type II  --- Not on home meds  --- Last A1c 6.2.  Will repeat  --- Blood sugars look okay  --- Continue insulin sliding scale            Diet: Fluid restriction 1800 ML FLUID  NPO per Anesthesia Guidelines for Procedure/Surgery Except for: Meds    DVT Prophylaxis: CDs with DOAC on hold  Chairez Catheter: Not present  Lines: PRESENT      PICC 08/10/24 Triple Lumen Right Brachial vein medial-Site Assessment: WDL      Cardiac Monitoring: ACTIVE order. Indication: Tachyarrhythmias, acute (48 hours)  Code Status: Full Code      Clinically Significant Risk Factors Present on Admission              # Hypoalbuminemia: Lowest albumin = 2.2 g/dL at 8/18/2024  7:04 AM, will monitor as appropriate  # Drug Induced Coagulation Defect: home medication list includes an anticoagulant medication    # Hypertension: Noted on problem list    # Anemia: based on hgb <11       # Overweight: Estimated body mass index is 25.79 kg/m  as calculated from the following:    Height as of 8/10/24: 1.651 m (5' 5\").    Weight as of this encounter: 70.3 kg (154 lb 15.7 oz).         # Financial/Environmental Concerns:     # Pacemaker present             Disposition Plan     Medically Ready for Discharge: Anticipated in 5+ Days             Antonia Baptiste MD  Hospitalist Service  [facility]  Securely message with [employer] (more info)  Text page via [employer] Paging/Directory "   ______________________________________________________________________    Interval History   --- Patient seen with family in the room  --- Understands more about risks of procedure after meeting with surgeon.  She is somewhat uncertain if she wants to have the surgery today and had extensive discussion regarding risks and benefits.  --- Oxygen needs unchanged  --- Discomfort with chest tube unchanged  --- Several family members in the room  --- She understands importance of intubation in the setting of anesthesia  --- No urgent issues overnight    Physical Exam   Vital Signs: Temp: 97.8  F (36.6  C) Temp src: Oral BP: 112/62 Pulse: 82   Resp: 20 SpO2: 98 % O2 Device: Nasal cannula Oxygen Delivery: 5 LPM  Weight: 154 lbs 15.73 oz    General Appearance: Pleasant frail, NAD, more anxious today but no dyspnea  Respiratory: Relatively clear, mild rhonchi anteriorly.  Chest tube noted.  On the right  Cardiovascular: Regular rate and rhythm,   GI: Soft and nontender without hepatosplenomegaly, masses palpable.  Skin: Similar appearance, no significant lower extremity edema.  Frail skin with senile purpura and tattoos noted without open areas.  Other: Mildly tremulous today.  Otherwise neuro grossly intact without focal deficits appreciated    Medical Decision Making             Data     I have personally reviewed the following data over the past 24 hrs:    10.4  \   10.2 (L)   / 274     144 107 17.6 /  126 (H)   4.2 31 (H) 0.55 \     ALT: N/A AST: N/A AP: N/A TBILI: N/A   ALB: N/A TOT PROTEIN: N/A LIPASE: N/A       Imaging results reviewed over the past 24 hrs:   Recent Results (from the past 24 hour(s))   XR Chest Port 1 View    Narrative    EXAM: XR CHEST PORT 1 VIEW  LOCATION: [facility]  DATE: 8/19/2024    INDICATION: Recheck chest tube and pleural effusions  COMPARISON: August 18, 2024      Impression    IMPRESSION: Stable pacemaker, TAVR, right chest tube, right PICC. Minimal change in near  complete opacification of the right hemithorax with volume loss.

## 2024-08-19 NOTE — OP NOTE
Operative Note    Name:  Mary Kay Tejada  PCP:  Cammy Bui  Procedure Date:  8/19/2024       Procedure:  Procedure(s):  VIDEO-ASSISTED RIGHTTHORACOSCOPIC SURGERY (VATS) WITH RIGHT PLEURAL FLUID CYTOLOGY     Pre-Procedure Diagnosis:  Empyema (H) [J86.9]     Post-Procedure Diagnosis:    Deflated right lung of unclear etiology.  Unable to inflate.  No empyema.    Surgeon(s):    Renée Soriano MD     Assistant: Erika Galaviz PA-C        Anesthesia Type:  General       Findings:  Fibrinous gelatinous fluid in the right pleural cavity.  No peel around her lung.  Unable to inflate her lung which is most likely secondary to a blockage of her bronchus.    Operative Report:    The patient was properly identified and brought to the operating suite where she was placed in the supine position.  General anesthesia via a double-lumen tube was administered.  She was then placed into the left lateral decubitus position.  I removed her small chest tube placed by radiology and then she was prepped and draped in a sterile fashion.  Anesthesia stopped trying to inflate her right lung.  I made a small incision somewhat posterior and lower down in the right chest and carefully entered the right lung cavity.  I could feel that it was quite low and I could feel just a lot of gelatinous type material in the thoracic cavity.  I broke this up as best as I could with my finger and then placed a port followed by the camera.  Immediately visible was the lung which was deflated but otherwise appeared normal.  There was no peel around the lung at all.  There was a lot of fibrinous exudate along the side of the chest wall that is able to break up easily.  I placed another 5 mm port further up in the chest and using that was able to suction out much of the gelatinous type fluid in the lower chest.  Again the lung itself had no fibrinous exudate on it.  At this point we asked anesthesia to start inflating the lung.  Despite  even taking pressure off to stop inflating the left, and even with significant pressure there was no inflation of the right lung.  This would indicate a blockage of the right bronchus.  We asked anesthesia to then just go back to normal in insufflation.  I removed the lower port and placed a 32 Azerbaijani chest tube through that site.  This was secured with an 0 silk.  The other port was removed and this was closed with a 4-0 Monocryl.  Prior to removing the port some of the fluid was suctioned out and sent for cytology.  Sterile dressings were placed.  The patient Toller procedure well.  My assistant, THAIS Bateman provided exposure with the camera and then assisted with closure at the end of the case.    Estimated Blood Loss:   5cc        Specimens:    ID Type Source Tests Collected by Time Destination   A : RIGHT PLEURAL FLUID WASHING WITH SODIUM CHLORIDE Pleural fluid Pleural Cavity, Right NON-GYNECOLOGIC CYTOLOGY Renée Soriano MD 8/19/2024  3:14 PM            Drains:   Chest Tube 1 Right Pleural 32 Azerbaijani (Active)       Urethral Catheter 08/19/24 Non-latex 16 fr (Active)       Complications:    None    Renée Soriano MD     Date: 8/19/2024  Time: 3:45 PM

## 2024-08-19 NOTE — TREATMENT PLAN
RCAT Treatment Plan    Patient Score: 14  Patient Acuity: 3    Clinical Indication for Therapy: history of bronchospasm, productive cough, and prevent atelectasis    Therapy Ordered: Duoneb QID/Q4 prn, Incentive Spirometry every two hours while awake    Assessment Summary: PT currently on 5L NC.  Chest tube on right.  Strong, productive cough.  BS diminished.  RT will re-eval RCAT in x72 hours.     Percy Alonso, RT  8/19/2024

## 2024-08-19 NOTE — PROGRESS NOTES
"PULMONARY PROGRESS NOTE    Date / Time of Admission:  8/17/2024  1:56 PM  Assessment:   74yoF with severe emphysema on home O2 at 3L here with strep pneumoniae causing empyema. Unfortunately very difficult to get lung to re-expand. No rind seen during VATS 8/19 but lung is better inflated currently.     Active Problems:    Empyema (H)      Advance Directives:  Full code    Plan:   Pulmonary:  1) Empyema with large effusion  2) Emphysema  3) COPD on Home O2 (3L) with current exacerbation  -Steroids  -Chest tube to remain in place   -CT to plan for bronchoscopy in OR 8/21 with me. NPO after midnight.   -Wean O2 for sats 88-92%, currently on 3L      Subjective:   HPI:  Mary Kay Tejada is a 74 year old female with history of Emphysema on home O2 who presented with pneumonia. CT revealed large effusion that did not drain well with chest tubes but fluid found emppyema. She had VATs done 8/19 that found no rind but very difficult to get lung to inflate.   Had bronchoscopy at [facility] that was unremarkable but lung still not fully expanded.         Allergies:   Allergies   Allergen Reactions    Celebrex [Celecoxib] Rash     patient had butterfly rash - \"lupus-like\"      Latex Rash        MEDS:  Scheduled Meds:  Current Facility-Administered Medications   Medication Dose Route Frequency Provider Last Rate Last Admin    acetaminophen (TYLENOL) tablet 975 mg  975 mg Oral Q8H Renée Soriano MD   975 mg at 08/20/24 1032    [Held by provider] apixaban ANTICOAGULANT (ELIQUIS) tablet 5 mg  5 mg Oral BID Angela Kaiser MD        artificial saliva (BIOTENE MT) solution 1 spray  1 spray Mouth/Throat 4x Daily Renée Soriano MD   1 spray at 08/20/24 1211    atorvastatin (LIPITOR) tablet 20 mg  20 mg Oral At Bedtime Renée Soriano MD   20 mg at 08/19/24 2256    digoxin (LANOXIN) tablet 125 mcg  125 mcg Oral Daily Renée Soriano MD   125 mcg at 08/20/24 0956    diltiazem ER COATED BEADS (CARDIZEM CD/CARTIA XT) 24 hr capsule 120 " mg  120 mg Oral Daily Renée Soriano MD   120 mg at 08/20/24 0956    enoxaparin ANTICOAGULANT (LOVENOX) injection 40 mg  40 mg Subcutaneous Q24H Renée Soriano MD   40 mg at 08/20/24 1032    fluconazole (DIFLUCAN) intermittent infusion 200 mg  200 mg Intravenous Q24H Renée Soriano  mL/hr at 08/19/24 2025 200 mg at 08/19/24 2025    fluticasone-vilanterol (BREO ELLIPTA) 200-25 MCG/ACT inhaler 1 puff  1 puff Inhalation Daily Renée Soriano MD   1 puff at 08/20/24 0957    [Held by provider] furosemide (LASIX) injection 20 mg  20 mg Intravenous Q8H Angela Kaiser MD        gabapentin (NEURONTIN) capsule 600 mg  600 mg Oral TID Renée Soriano MD   600 mg at 08/19/24 2257    insulin aspart (NovoLOG) injection (RAPID ACTING)  1-10 Units Subcutaneous TID AC Renée Soriano MD   8 Units at 08/18/24 1713    insulin aspart (NovoLOG) injection (RAPID ACTING)  1-7 Units Subcutaneous At Bedtime Renée Soriano MD   4 Units at 08/18/24 2026    Lidocaine (LIDOCARE) 4 % Patch 1 patch  1 patch Transdermal Q24h Antonia Baptiste MD        metoprolol tartrate (LOPRESSOR) tablet 50 mg  50 mg Oral BID Renée Soriano MD   50 mg at 08/20/24 0956    pantoprazole (PROTONIX) EC tablet 40 mg  40 mg Oral QAM AC Renée Soriano MD   40 mg at 08/20/24 0646    piperacillin-tazobactam (ZOSYN) 3.375 g vial to attach to  mL bag  3.375 g Intravenous Q8H Antonia Baptiste MD   3.375 g at 08/20/24 1211    polyethylene glycol (MIRALAX) Packet 17 g  17 g Oral Daily Renée Soriano MD        [START ON 8/21/2024] predniSONE (DELTASONE) tablet 20 mg  20 mg Oral Daily Antonia Baptiste MD        Followed by    [START ON 8/25/2024] predniSONE (DELTASONE) tablet 10 mg  10 mg Oral Daily Antonia Baptiste MD        senna-docusate (SENOKOT-S/PERICOLACE) 8.6-50 MG per tablet 1 tablet  1 tablet Oral BID Renée Soriano MD        sodium chloride (PF) 0.9% PF flush 10-40 mL  10-40 mL Intracatheter Q7 Days Renée Soriano MD   10 mL at 08/17/24 1703    sodium  chloride (PF) 0.9% PF flush 3 mL  3 mL Intracatheter Q8H Renée Soriano MD   3 mL at 08/20/24 1033    sodium chloride (PF) 0.9% PF flush 3 mL  3 mL Intracatheter Q8H Renée Soriano MD   3 mL at 08/20/24 0958    sterile talc (STERITALC) powder for sclerosing 4 g  4 g INTRAPLEURAL Once Renée Soriano MD        sucralfate (CARAFATE) tablet 1 g  1 g Oral TID AC Renée Soriano MD   1 g at 08/20/24 1210     Continuous Infusions:  Current Facility-Administered Medications   Medication Dose Route Frequency Provider Last Rate Last Admin    sodium chloride 0.9 % infusion   Intravenous Continuous Renée Soriano MD   Stopped at 08/20/24 1104     PRN Meds:.  Current Facility-Administered Medications   Medication Dose Route Frequency Provider Last Rate Last Admin    acetaminophen (TYLENOL) tablet 650 mg  650 mg Oral Q4H PRN Renée Soriano MD   650 mg at 08/18/24 2026    Or    acetaminophen (TYLENOL) Suppository 650 mg  650 mg Rectal Q4H PRN Renée Soriano MD        [START ON 8/22/2024] acetaminophen (TYLENOL) tablet 650 mg  650 mg Oral Q4H PRN Renée Soriano MD        [Held by provider] albuterol (PROVENTIL HFA/VENTOLIN HFA) inhaler  2 puff Inhalation Q4H PRN Angela Kaiser MD        benzocaine-menthol (CHLORASEPTIC) 6-10 MG lozenge 1 lozenge  1 lozenge Buccal Q1H PRN Renée Soriano MD        [START ON 8/22/2024] bisacodyl (DULCOLAX) suppository 10 mg  10 mg Rectal Daily PRN Renée Soriano MD        calcium carbonate (TUMS) chewable tablet 1,000 mg  1,000 mg Oral 4x Daily PRN Renée Soriano MD        glucose gel 15-30 g  15-30 g Oral Q15 Min PRN Renée Soriano MD        Or    dextrose 50 % injection 25-50 mL  25-50 mL Intravenous Q15 Min PRN Renée Soriano MD        Or    glucagon injection 1 mg  1 mg Subcutaneous Q15 Min PRN Renée Soriano MD        HYDROmorphone (DILAUDID) injection 0.2 mg  0.2 mg Intravenous Q2H PRN Renée Soriano MD   0.2 mg at 08/20/24 0746    Or    HYDROmorphone (DILAUDID) injection 0.4  mg  0.4 mg Intravenous Q2H PRN Renée Soriano MD        ipratropium - albuterol 0.5 mg/2.5 mg/3 mL (DUONEB) neb solution 3 mL  3 mL Nebulization Q4H PRN Antonia Baptiste MD        ipratropium - albuterol 0.5 mg/2.5 mg/3 mL (DUONEB) neb solution 3 mL  3 mL Nebulization Q4H PRN Renée Soriano MD        lidocaine (LMX4) cream   Topical Q1H PRN Renée Soriano MD        lidocaine 1 % 0.1-1 mL  0.1-1 mL Other Q1H PRN Renée Soriano MD        [START ON 8/21/2024] magnesium hydroxide (MILK OF MAGNESIA) suspension 30 mL  30 mL Oral Daily PRN Renée Soriano MD        melatonin tablet 1 mg  1 mg Oral At Bedtime PRN Renée Soriano MD        miconazole (MICATIN) 2 % powder   Topical TID PRN Renée Soriano MD        naloxone (NARCAN) injection 0.2 mg  0.2 mg Intravenous Q2 Min PRN Renée Soriano MD        naloxone (NARCAN) injection 0.2 mg  0.2 mg Intramuscular Q2 Min PRRenée Carmen MD        naloxone (NARCAN) injection 0.4 mg  0.4 mg Intravenous Q2 Min PRRenée Carmen MD        naloxone (NARCAN) injection 0.4 mg  0.4 mg Intramuscular Q2 Min PRRenée Carmen MD        ondansetron (ZOFRAN ODT) ODT tab 4 mg  4 mg Oral Q6H PRN Renée Soriano MD        Or    ondansetron (ZOFRAN) injection 4 mg  4 mg Intravenous Q6H PRN Renée Soriano MD        polyethylene glycol (MIRALAX) Packet 17 g  17 g Oral Daily PRN Renée Soriano MD        prochlorperazine (COMPAZINE) injection 5 mg  5 mg Intravenous Q6H PRN Renée Soriano MD        Or    prochlorperazine (COMPAZINE) tablet 5 mg  5 mg Oral Q6H PRN Renée Soriano MD        prochlorperazine (COMPAZINE) injection 5 mg  5 mg Intravenous Q6H PRN Renée Soriano MD        Or    prochlorperazine (COMPAZINE) tablet 5 mg  5 mg Oral Q6H PRN Renée Soriano MD        Or    prochlorperazine (COMPAZINE) suppository 12.5 mg  12.5 mg Rectal Q12H PRN Renée Soriano MD        senna-docusate (SENOKOT-S/PERICOLACE) 8.6-50 MG per tablet 1 tablet  1 tablet Oral BID PRN Renée Soriano MD         Or    senna-docusate (SENOKOT-S/PERICOLACE) 8.6-50 MG per tablet 2 tablet  2 tablet Oral BID PRN Renée Soriano MD        sodium chloride (PF) 0.9% PF flush 10-20 mL  10-20 mL Intracatheter q1 min prn Renée Soriano MD        sodium chloride (PF) 0.9% PF flush 10-40 mL  10-40 mL Intracatheter Q1H PRN Renée Soriano MD        sodium chloride (PF) 0.9% PF flush 3 mL  3 mL Intracatheter q1 min prn Renée Soriano MD        sodium chloride (PF) 0.9% PF flush 3 mL  3 mL Intracatheter q1 min prn Renée Soriano MD        traMADol (ULTRAM) tablet 50 mg  50 mg Oral Q6H PRN Renée Soriano MD           Objective:   VITALS:  /60 (BP Location: Left arm, Patient Position: Semi-Man's, Cuff Size: Adult Regular)   Pulse 82   Temp 97.9  F (36.6  C) (Oral)   Resp 19   Wt 70 kg (154 lb 5.2 oz)   LMP  (LMP Unknown)   SpO2 93%   BMI 25.68 kg/m    EXAM:    GENERAL APPEARANCE: Alert, no acute distress  HENT: Oral mucosa and posterior oropharynx normal, moist mucous membranes  NECK: No adenopathy,masses or thyromegaly  RESP: lungs clear to auscultation bilaterally  CV: regular rate and rhythm, no murmur, rub or gallop  ABDOMEN: normal bowel sounds, soft, nontender, no hepatosplenomegaly or other masses  LYMPHATICS: No cervical, or supraclavicular adenopathy  SKIN: no suspicious lesions or rashes  NEURO: Alert, oriented x 3, speech and mentation normal        I&O:    Date 08/20/24 0700 - 08/21/24 0659   Shift 6334-1375 9243-3176 1965-1319 24 Hour Total   INTAKE   I.V. 216.67   216.67   Shift Total(mL/kg) 216.67(3.1)   216.67(3.1)   OUTPUT   Chest Tube(mL/kg) 120(1.71)   120(1.71)   Shift Total(mL/kg) 120(1.71)   120(1.71)   Weight (kg) 70 70 70 70       Data Review:    Imaging: personally reviewed  Chest Xray  EXAM: XR CHEST PORT 1 VIEW  LOCATION: [facility]  DATE: 8/20/2024     INDICATION: Recheck chest tube and pleural effusions. Postop thoracotomy and empyema drainage.  COMPARISON:  8/19/2024 and older studies, CT chest 8/16/2024 and older studies                                                                      IMPRESSION: Postthoracotomy changes with large bore right-sided chest tube. Removal of the right basilar pigtail chest tube. Dual-lead left subclavian venous pacer, TAVR and right upper extremity PICC line catheter remain in good position.     There has been only partial reexpansion of the right upper lobe and apparently non of the right lower lobe. Likely small pneumothorax outlining collapsed lung in the right lateral costophrenic angle. Small right apical pleural cap persists. Volume loss   with marked shift of the mediastinum into the right chest has only slightly decreased. Minimal atelectasis in the left base behind the heart with trace effusion again noted. No signs of pneumonia or failure.    Results for orders placed or performed during the hospital encounter of 08/17/24   Basic metabolic panel   Result Value Ref Range    Sodium 143 135 - 145 mmol/L    Potassium 4.5 3.4 - 5.3 mmol/L    Chloride 106 98 - 107 mmol/L    Carbon Dioxide (CO2) 32 (H) 22 - 29 mmol/L    Anion Gap 5 (L) 7 - 15 mmol/L    Urea Nitrogen 15.9 8.0 - 23.0 mg/dL    Creatinine 0.51 0.51 - 0.95 mg/dL    GFR Estimate >90 >60 mL/min/1.73m2    Calcium 8.1 (L) 8.8 - 10.4 mg/dL    Glucose 138 (H) 70 - 99 mg/dL     Lab Results   Component Value Date    WBC 11.9 (H) 08/20/2024    HGB 10.0 (L) 08/20/2024    HCT 35.8 08/20/2024    MCV 87 08/20/2024     08/20/2024     Last Arterial Blood Gas:  Recent Labs   Lab 08/17/24  1804   O2PER 32

## 2024-08-19 NOTE — PLAN OF CARE
Problem: Adult Inpatient Plan of Care  Goal: Absence of Hospital-Acquired Illness or Injury  Intervention: Identify and Manage Fall Risk  Recent Flowsheet Documentation  Taken 8/18/2024 2340 by Amor Roman RN  Safety Promotion/Fall Prevention:   activity supervised   nonskid shoes/slippers when out of bed   patient and family education   room near nurse's station   supervised activity   safety round/check completed  Intervention: Prevent Skin Injury  Recent Flowsheet Documentation  Taken 8/18/2024 2340 by Amor Roman RN  Body Position: (refused, pt able to slightly turn herself) other (see comments)  Intervention: Prevent Infection  Recent Flowsheet Documentation  Taken 8/18/2024 2340 by Amor Roman RN  Infection Prevention:   hand hygiene promoted   rest/sleep promoted  Goal: Optimal Comfort and Wellbeing  Intervention: Provide Person-Centered Care  Recent Flowsheet Documentation  Taken 8/18/2024 2340 by Amor Roman RN  Trust Relationship/Rapport:   care explained   choices provided   emotional support provided   empathic listening provided   questions answered   questions encouraged   reassurance provided   thoughts/feelings acknowledged     Problem: Comorbidity Management  Goal: Maintenance of COPD Symptom Control  Intervention: Maintain COPD Symptom Control  Recent Flowsheet Documentation  Taken 8/18/2024 2340 by Amor Roman RN  Supportive Measures:   active listening utilized   verbalization of feelings encouraged  Medication Review/Management: medications reviewed  Goal: Blood Glucose Levels Within Targeted Range  Intervention: Monitor and Manage Glycemia  Recent Flowsheet Documentation  Taken 8/18/2024 2340 by Amor Roman RN  Medication Review/Management: medications reviewed  Goal: Blood Pressure in Desired Range  Intervention: Maintain Blood Pressure Management  Recent Flowsheet Documentation  Taken 8/18/2024 2340 by Amor Roman RN  Medication Review/Management:  medications reviewed     Problem: Risk for Delirium  Goal: Optimal Coping  Intervention: Optimize Psychosocial Adjustment to Delirium  Recent Flowsheet Documentation  Taken 8/18/2024 2340 by Amor Roman, RN  Supportive Measures:   active listening utilized   verbalization of feelings encouraged  Family/Support System Care:   support provided   self-care encouraged  Goal: Improved Behavioral Control  Intervention: Prevent and Manage Agitation  Recent Flowsheet Documentation  Taken 8/18/2024 2340 by Amor Roman, RN  Environment Familiarity/Consistency: daily routine followed  Intervention: Minimize Safety Risk  Recent Flowsheet Documentation  Taken 8/18/2024 2340 by Amor Roman, RN  Communication Enhancement Strategies: call light answered in person  Enhanced Safety Measures:   room near unit station   pain management   review medications for side effects with activity  Trust Relationship/Rapport:   care explained   choices provided   emotional support provided   empathic listening provided   questions answered   questions encouraged   reassurance provided   thoughts/feelings acknowledged  Goal: Improved Attention and Thought Clarity  Intervention: Maximize Cognitive Function  Recent Flowsheet Documentation  Taken 8/18/2024 2340 by Amor Roman RN  Reorientation Measures:   calendar in view   clock in view   Goal Outcome Evaluation:    Patient is alert and oriented x 4. Pleasant and cooperative but withdrawn. Denies pain, nausea and shortness of breath. Repositions herself in bed as needed and refuses to have staff reposition her. She remains on 5 LPM of supplemental oxygen to maintain O2 sats. NPO status maintained since midnight for planned AM procedure. CHG bath per orders, LR started and meds given per orders. Report given to MARTINA RN.   Telemetry: atrial flutter

## 2024-08-19 NOTE — PLAN OF CARE
Problem: Dysrhythmia  Goal: Normalized Cardiac Rhythm  Outcome: Progressing   Goal Outcome Evaluation:    Patient's family was at the bedside before the shift started. They were very upset that she was not being taken down for her VATS procedure. I checked with MARTINA to see what time she would be going and they said that her procedure was moved to 1400 due to patient being given apple sauce with her 0600 meds.   Patient taken down just after 1200. Family remains in the room.

## 2024-08-19 NOTE — CONSULTS
Care Management Initial Consult    General Information  Assessment completed with: Patient,    Type of CM/SW Visit: Initial Assessment    Primary Care Provider verified and updated as needed: Yes   Readmission within the last 30 days: no previous admission in last 30 days      Reason for Consult: discharge planning  Advance Care Planning:            Communication Assessment  Patient's communication style: spoken language (English or Bilingual)             Cognitive  Cognitive/Neuro/Behavioral: WDL  Level of Consciousness: alert  Arousal Level: opens eyes spontaneously  Orientation: oriented x 4  Mood/Behavior: calm, cooperative  Best Language: 0 - No aphasia  Speech: clear, logical, spontaneous    Living Environment:   People in home: child(david), adult     Current living Arrangements: house      Able to return to prior arrangements: yes       Family/Social Support:  Care provided by: child(david)  Provides care for: no one  Marital Status:   Children          Description of Support System: Supportive, Involved         Current Resources:   Patient receiving home care services: No     Community Resources: None  Equipment currently used at home: cane, straight, walker, standard  Supplies currently used at home:      Employment/Financial:  Employment Status: retired        Financial Concerns: none   Referral to Financial Worker: No       Does the patient's insurance plan have a 3 day qualifying hospital stay waiver?  Yes     Which insurance plan 3 day waiver is available? Alternative insurance waiver    Will the waiver be used for post-acute placement? No    Lifestyle & Psychosocial Needs:  Social Determinants of Health     Food Insecurity: Low Risk  (12/26/2023)    Food Insecurity     Within the past 12 months, did you worry that your food would run out before you got money to buy more?: No     Within the past 12 months, did the food you bought just not last and you didn t have money to get more?: No    Depression: Not at risk (8/8/2024)    PHQ-2     PHQ-2 Score: 1   Housing Stability: Low Risk  (12/26/2023)    Housing Stability     Do you have housing? : Yes     Are you worried about losing your housing?: No   Tobacco Use: High Risk (8/10/2024)    Patient History     Smoking Tobacco Use: Some Days     Smokeless Tobacco Use: Never     Passive Exposure: Never   Financial Resource Strain: Low Risk  (12/26/2023)    Financial Resource Strain     Within the past 12 months, have you or your family members you live with been unable to get utilities (heat, electricity) when it was really needed?: No   Alcohol Use: Not on file   Transportation Needs: Low Risk  (12/26/2023)    Transportation Needs     Within the past 12 months, has lack of transportation kept you from medical appointments, getting your medicines, non-medical meetings or appointments, work, or from getting things that you need?: No   Physical Activity: Not on file   Interpersonal Safety: Low Risk  (4/1/2024)    Interpersonal Safety     Do you feel physically and emotionally safe where you currently live?: Yes     Within the past 12 months, have you been hit, slapped, kicked or otherwise physically hurt by someone?: No     Within the past 12 months, have you been humiliated or emotionally abused in other ways by your partner or ex-partner?: No   Stress: Not on file   Social Connections: Not on file   Health Literacy: Not on file       Functional Status:  Prior to admission patient needed assistance:   Dependent ADLs:: Ambulation-cane, Ambulation-walker, Bathing  Dependent IADLs:: Cleaning, Cooking, Laundry, Shopping, Meal Preparation, Medication Management, Money Management, Transportation       Mental Health Status:  Mental Health Status: No Current Concerns       Chemical Dependency Status:  Chemical Dependency Status: No Current Concerns             Values/Beliefs:  Spiritual, Cultural Beliefs, Yarsanism Practices, Values that affect care: no  Description  of Beliefs that Will Affect Care: ángel            Additional Information:  SW met with patient and grand daughter, America, who was present at bedside. SW introduced self and CM role. Patient transferred from [facility] where she was hospitalized 8/10-8/17. Patient reports living in a house with her son Heriberto and daughter in law, Cynthia. Cynthia provides PCA services to pt (pt unsure how many hours she is approved for). Pt has both a cane and walker at home which she uses as needed. Patient is not receiving any home care services at this time. Pt reports being mostly independent with ADLs and receiving support from Cynthia with IADLs.     Pt is scheduled to have surgery today. Patient and grand daughter vocalize their wish to move the surgery to tomorrow. SW notified charge nurse. Grand daughter also requests health care directive and POLST paperwork to review with patient and family to ensure pt's wishes are appropriately documented and understood by all family members. SW provided blank copies of these documents.     Final discharge plan pending. During hospitalization at [facility], therapy recommended transitional care and pt was accepted at [facility] (formerly [facility] If patient needs TCU at discharge, Destinee in admissions reports she will need to re-review referral. Pt verbalizes goal to discharge to home after hospitalization with additional supports in place if needed, grand daughter mentioned both home care and hospice. SW discussed plan to follow care throughout hospitalization and follow up on team recommendations to assist with safe discharge planning.     LUZ Hernandez at 9:10 AM on 08/19/24

## 2024-08-19 NOTE — ANESTHESIA PROCEDURE NOTES
Airway       Patient location during procedure: OR       Procedure Start/Stop Times: 8/19/2024 2:27 PM  Staff -        Anesthesiologist:  Sarah Shafer MD       CRNA: Kirit Murillo APRN CRNA       Performed By: CRNA  Consent for Airway        Urgency: elective  Indications and Patient Condition       Indications for airway management: juanjo-procedural       Induction type:intravenous       Mask difficulty assessment: 2 - vent by mask + OA or adjuvant +/- NMBA    Final Airway Details       Final airway type: endotracheal airway       Successful airway: ETT - double lumen left  Endotracheal Airway Details        Cuffed: yes       Successful intubation technique: direct laryngoscopy       DL Blade Type: MAC 3       Grade View of Cords: 1       Adjucts: stylet       Position: Right       Measured from: lips       Secured at (cm): 28       Bite block used: None       ETT Double lumen (fr): 35    Post intubation assessment        Placement verified by: capnometry        Number of attempts at approach: 1       Number of other approaches attempted: 0       Secured with: tape       Ease of procedure: easy       Dentition: Unchanged    Medication(s) Administered   Medication Administration Time: 8/19/2024 2:27 PM

## 2024-08-19 NOTE — ANESTHESIA POSTPROCEDURE EVALUATION
Patient: Mary Kay Tejada    Procedure: Procedure(s):  VIDEO-ASSISTED RIGHT THORACOSCOPIC SURGERY (VATS) WITH RIGHT PLEURAL FLUID CYTOLOGY       Anesthesia Type:  General    Note:  Disposition: Inpatient   Postop Pain Control: Uneventful            Sign Out: Well controlled pain   PONV: No   Neuro/Psych: Uneventful            Sign Out: Acceptable/Baseline neuro status   Airway/Respiratory: Uneventful            Sign Out: Acceptable/Baseline resp. status   CV/Hemodynamics: Uneventful            Sign Out: Acceptable CV status; No obvious hypovolemia; No obvious fluid overload   Other NRE: NONE   DID A NON-ROUTINE EVENT OCCUR? No       Last vitals:  Vitals Value Taken Time   /65 08/19/24 1700   Temp 36.1  C (96.98  F) 08/19/24 1711   Pulse 107 08/19/24 1711   Resp 17 08/19/24 1711   SpO2 93 % 08/19/24 1711   Vitals shown include unfiled device data.    Electronically Signed By: Kt Ruiz MD  August 19, 2024  5:13 PM

## 2024-08-20 ENCOUNTER — APPOINTMENT (OUTPATIENT)
Dept: CT IMAGING | Facility: HOSPITAL | Age: 74
DRG: 166 | End: 2024-08-20
Attending: INTERNAL MEDICINE
Payer: COMMERCIAL

## 2024-08-20 ENCOUNTER — APPOINTMENT (OUTPATIENT)
Dept: RADIOLOGY | Facility: HOSPITAL | Age: 74
DRG: 166 | End: 2024-08-20
Attending: SPECIALIST
Payer: COMMERCIAL

## 2024-08-20 ENCOUNTER — APPOINTMENT (OUTPATIENT)
Dept: SPEECH THERAPY | Facility: HOSPITAL | Age: 74
DRG: 166 | End: 2024-08-20
Attending: SPECIALIST
Payer: COMMERCIAL

## 2024-08-20 ENCOUNTER — TELEPHONE (OUTPATIENT)
Dept: FAMILY MEDICINE | Facility: CLINIC | Age: 74
End: 2024-08-20

## 2024-08-20 LAB
ANION GAP SERPL CALCULATED.3IONS-SCNC: 5 MMOL/L (ref 7–15)
BUN SERPL-MCNC: 15.9 MG/DL (ref 8–23)
CALCIUM SERPL-MCNC: 8.1 MG/DL (ref 8.8–10.4)
CHLORIDE SERPL-SCNC: 106 MMOL/L (ref 98–107)
CREAT SERPL-MCNC: 0.51 MG/DL (ref 0.51–0.95)
EGFRCR SERPLBLD CKD-EPI 2021: >90 ML/MIN/1.73M2
ERYTHROCYTE [DISTWIDTH] IN BLOOD BY AUTOMATED COUNT: 17.7 % (ref 10–15)
GLUCOSE BLDC GLUCOMTR-MCNC: 112 MG/DL (ref 70–99)
GLUCOSE BLDC GLUCOMTR-MCNC: 128 MG/DL (ref 70–99)
GLUCOSE BLDC GLUCOMTR-MCNC: 135 MG/DL (ref 70–99)
GLUCOSE BLDC GLUCOMTR-MCNC: 139 MG/DL (ref 70–99)
GLUCOSE SERPL-MCNC: 138 MG/DL (ref 70–99)
HCO3 SERPL-SCNC: 32 MMOL/L (ref 22–29)
HCT VFR BLD AUTO: 35.8 % (ref 35–47)
HGB BLD-MCNC: 10 G/DL (ref 11.7–15.7)
MAGNESIUM SERPL-MCNC: 1.9 MG/DL (ref 1.7–2.3)
MCH RBC QN AUTO: 24.3 PG (ref 26.5–33)
MCHC RBC AUTO-ENTMCNC: 27.9 G/DL (ref 31.5–36.5)
MCV RBC AUTO: 87 FL (ref 78–100)
PLATELET # BLD AUTO: 271 10E3/UL (ref 150–450)
POTASSIUM SERPL-SCNC: 4.5 MMOL/L (ref 3.4–5.3)
RBC # BLD AUTO: 4.12 10E6/UL (ref 3.8–5.2)
SODIUM SERPL-SCNC: 143 MMOL/L (ref 135–145)
WBC # BLD AUTO: 11.9 10E3/UL (ref 4–11)

## 2024-08-20 PROCEDURE — 92610 EVALUATE SWALLOWING FUNCTION: CPT | Mod: GN

## 2024-08-20 PROCEDURE — 250N000013 HC RX MED GY IP 250 OP 250 PS 637: Performed by: SPECIALIST

## 2024-08-20 PROCEDURE — 99232 SBSQ HOSP IP/OBS MODERATE 35: CPT | Performed by: FAMILY MEDICINE

## 2024-08-20 PROCEDURE — 258N000003 HC RX IP 258 OP 636: Performed by: SPECIALIST

## 2024-08-20 PROCEDURE — 80048 BASIC METABOLIC PNL TOTAL CA: CPT | Performed by: SPECIALIST

## 2024-08-20 PROCEDURE — 71045 X-RAY EXAM CHEST 1 VIEW: CPT

## 2024-08-20 PROCEDURE — 99233 SBSQ HOSP IP/OBS HIGH 50: CPT | Performed by: INTERNAL MEDICINE

## 2024-08-20 PROCEDURE — 250N000013 HC RX MED GY IP 250 OP 250 PS 637: Performed by: FAMILY MEDICINE

## 2024-08-20 PROCEDURE — 250N000011 HC RX IP 250 OP 636: Performed by: SPECIALIST

## 2024-08-20 PROCEDURE — 92526 ORAL FUNCTION THERAPY: CPT | Mod: GN

## 2024-08-20 PROCEDURE — 250N000011 HC RX IP 250 OP 636: Performed by: FAMILY MEDICINE

## 2024-08-20 PROCEDURE — 85027 COMPLETE CBC AUTOMATED: CPT | Performed by: SPECIALIST

## 2024-08-20 PROCEDURE — 71250 CT THORAX DX C-: CPT

## 2024-08-20 PROCEDURE — 120N000004 HC R&B MS OVERFLOW

## 2024-08-20 PROCEDURE — 99222 1ST HOSP IP/OBS MODERATE 55: CPT | Performed by: INTERNAL MEDICINE

## 2024-08-20 PROCEDURE — 83735 ASSAY OF MAGNESIUM: CPT | Performed by: SPECIALIST

## 2024-08-20 RX ORDER — PREDNISONE 20 MG/1
20 TABLET ORAL DAILY
Status: COMPLETED | OUTPATIENT
Start: 2024-08-21 | End: 2024-08-24

## 2024-08-20 RX ORDER — PREDNISONE 5 MG/1
10 TABLET ORAL DAILY
Status: COMPLETED | OUTPATIENT
Start: 2024-08-25 | End: 2024-08-28

## 2024-08-20 RX ORDER — IPRATROPIUM BROMIDE AND ALBUTEROL SULFATE 2.5; .5 MG/3ML; MG/3ML
3 SOLUTION RESPIRATORY (INHALATION) EVERY 4 HOURS PRN
Status: DISCONTINUED | OUTPATIENT
Start: 2024-08-20 | End: 2024-08-24

## 2024-08-20 RX ORDER — LIDOCAINE 4 G/G
1 PATCH TOPICAL
Status: DISCONTINUED | OUTPATIENT
Start: 2024-08-20 | End: 2024-09-09 | Stop reason: HOSPADM

## 2024-08-20 RX ADMIN — PIPERACILLIN AND TAZOBACTAM 3.38 G: 3; .375 INJECTION, POWDER, FOR SOLUTION INTRAVENOUS at 20:33

## 2024-08-20 RX ADMIN — ACETAMINOPHEN 975 MG: 325 TABLET ORAL at 10:32

## 2024-08-20 RX ADMIN — SODIUM CHLORIDE: 9 INJECTION, SOLUTION INTRAVENOUS at 23:39

## 2024-08-20 RX ADMIN — FLUCONAZOLE 200 MG: 2 INJECTION, SOLUTION INTRAVENOUS at 19:13

## 2024-08-20 RX ADMIN — SUCRALFATE 1 G: 1 TABLET ORAL at 12:10

## 2024-08-20 RX ADMIN — PANTOPRAZOLE SODIUM 40 MG: 40 TABLET, DELAYED RELEASE ORAL at 06:46

## 2024-08-20 RX ADMIN — ENOXAPARIN SODIUM 40 MG: 40 INJECTION SUBCUTANEOUS at 10:32

## 2024-08-20 RX ADMIN — METOPROLOL TARTRATE 50 MG: 25 TABLET, FILM COATED ORAL at 20:33

## 2024-08-20 RX ADMIN — SUCRALFATE 1 G: 1 TABLET ORAL at 06:46

## 2024-08-20 RX ADMIN — PIPERACILLIN AND TAZOBACTAM 3.38 G: 3; .375 INJECTION, POWDER, FOR SOLUTION INTRAVENOUS at 03:54

## 2024-08-20 RX ADMIN — Medication 1 SPRAY: at 12:11

## 2024-08-20 RX ADMIN — DILTIAZEM HYDROCHLORIDE 120 MG: 120 CAPSULE, EXTENDED RELEASE ORAL at 09:56

## 2024-08-20 RX ADMIN — ACETAMINOPHEN 975 MG: 325 TABLET ORAL at 01:59

## 2024-08-20 RX ADMIN — FLUTICASONE FUROATE AND VILANTEROL TRIFENATATE 1 PUFF: 200; 25 POWDER RESPIRATORY (INHALATION) at 09:57

## 2024-08-20 RX ADMIN — HYDROCORTISONE SODIUM SUCCINATE 25 MG: 100 INJECTION, POWDER, FOR SOLUTION INTRAMUSCULAR; INTRAVENOUS at 12:11

## 2024-08-20 RX ADMIN — ACETAMINOPHEN 975 MG: 325 TABLET ORAL at 19:13

## 2024-08-20 RX ADMIN — Medication 1 SPRAY: at 09:57

## 2024-08-20 RX ADMIN — HYDROCORTISONE SODIUM SUCCINATE 25 MG: 100 INJECTION, POWDER, FOR SOLUTION INTRAMUSCULAR; INTRAVENOUS at 03:53

## 2024-08-20 RX ADMIN — METOPROLOL TARTRATE 50 MG: 25 TABLET, FILM COATED ORAL at 09:56

## 2024-08-20 RX ADMIN — ATORVASTATIN CALCIUM 20 MG: 10 TABLET, FILM COATED ORAL at 20:35

## 2024-08-20 RX ADMIN — LIDOCAINE 1 PATCH: 4 PATCH TOPICAL at 20:33

## 2024-08-20 RX ADMIN — PIPERACILLIN AND TAZOBACTAM 3.38 G: 3; .375 INJECTION, POWDER, FOR SOLUTION INTRAVENOUS at 12:11

## 2024-08-20 RX ADMIN — DIGOXIN 125 MCG: 125 TABLET ORAL at 09:56

## 2024-08-20 RX ADMIN — SENNOSIDES AND DOCUSATE SODIUM 1 TABLET: 8.6; 5 TABLET ORAL at 20:33

## 2024-08-20 RX ADMIN — HYDROMORPHONE HYDROCHLORIDE 0.2 MG: 0.2 INJECTION, SOLUTION INTRAMUSCULAR; INTRAVENOUS; SUBCUTANEOUS at 07:46

## 2024-08-20 ASSESSMENT — ACTIVITIES OF DAILY LIVING (ADL)
ADLS_ACUITY_SCORE: 42
ADLS_ACUITY_SCORE: 46
ADLS_ACUITY_SCORE: 42
ADLS_ACUITY_SCORE: 42
ADLS_ACUITY_SCORE: 46
ADLS_ACUITY_SCORE: 42
ADLS_ACUITY_SCORE: 46
ADLS_ACUITY_SCORE: 46
ADLS_ACUITY_SCORE: 42
ADLS_ACUITY_SCORE: 46
ADLS_ACUITY_SCORE: 42
ADLS_ACUITY_SCORE: 42

## 2024-08-20 NOTE — CONSULTS
"SPIRITUAL HEALTH SERVICES CONSULT NOTE  [facility]; P3    Saw pt Mary Kay Tejada per Spiritual Care Consult (Due to admission screening response)    Patient/Family Understanding of Illness and Goals of Care - Brief visit with Mary Kay, her grandson, and his wife. Mary Kay says that the pain in her lungs is manageable at this time. She denies any concerns at this time.     Distress and Loss - Not discussed    Strengths, Coping, and Resources - Mary Kay is enjoying some emotional support from family members.     Meaning, Beliefs, and Spirituality - Mary Kay is Protestant. She is not connected to a jaswinder community. She denies any concerns at this time, stating \"I'm fine.\" I encouraged her to let nursing staff know if she desires further support from Spiritual Care.     Plan of Care: No further plans for follow-up at this time, but will remain available for further support as patient/family needs/desires.    Erika Sher M.Div.      Office: [phone] (for non-urgent requests)  Please Vocera or page through [facility] for time-sensitive requests    "

## 2024-08-20 NOTE — PROGRESS NOTES
[facility]    Medicine Progress Note - Hospitalist Service    Date of Admission:  8/17/2024    Assessment & Plan      74-year-old female with a past medical history of a flutter, COPD, ongoing tobacco abuse, previous pleural effusions and chronic pain admitted to the hospital with empyema; transferred from [facility] to [facility] for surgical management.    Empyema  --- POD #1 s/p VATS - per surgery no true emphyema or rind found - no ability to epand lung  ---planning bronchoscopy tomorrow in OR  ---had conservative management at , transferred here 8/17 after CT showing larger size 8/16  --- having daily CXR   --- General surgery and pulmonology following  --- Continue chest tube  --- first bronchoscopy 8/11 cultures showing E. coli and Candida albicans with pleural effusion cultures showing strep pneumo  --- ID followed at , changed antibiotics from ceftriaxone to Zosyn and fluconazole and recommended source control  ---repost ID today  ---holding  Eliquis  --- Oxygen needs stable  --- Had SLP eval including video swallow when at [facility].  Note reviewed from 8/14.  I am going to post them to follow while here    Acute on chronic respiratory failure  Advanced COPD  Tobacco dependence  --- Has been on 5 L as opposed to usual 3 L at home  --- Has been on oral taper of steroids for her COPD; stress dose steroids started yesterday and finished today and then back to taper  --- CTs per above  --- Pulmonology following  ---encourage smoking cessation    Paroxysmal A-fib/a flutter; status post PPM  Aortic stenosis status post TAVR  Acute on chronic HFpEF  --- Previously  on Cardizem drip at   --- Currently on metoprolol and diltiazem and digoxin with hold parameters  --- She is off amiodarone due to QTc prolongation  --- Have been holding her Lasix  --- Cardiology signed off 8/15  --echo 4/2024 with EF 55-60%   --- monitor on telemetry   --- mag ok    Deconditioning   ---  "Previously recommended to go to TCU and has bed there.  ---posting PT and OT for updated recs    Normocytic anemia; overall stable    Hyperlipidemia--statin ordered    Diabetes mellitus, type II  --- Not on home meds  --- Last A1c 6.2.  Will repeat  --- Blood sugars look okay  --- Continue insulin sliding scale            Diet: Fluid restriction 1800 ML FLUID  Advance Diet as Tolerated: Clear Liquid Diet    DVT Prophylaxis: PCDs - DOAC on hold  Chairez Catheter: Not present  Lines: PRESENT      PICC 08/10/24 Triple Lumen Right Brachial vein medial-Site Assessment: WDL      Cardiac Monitoring: None  Code Status: Full Code      Clinically Significant Risk Factors              # Hypoalbuminemia: Lowest albumin = 2.2 g/dL at 8/18/2024  7:04 AM, will monitor as appropriate     # Hypertension: Noted on problem list           # Overweight: Estimated body mass index is 25.68 kg/m  as calculated from the following:    Height as of 8/10/24: 1.651 m (5' 5\").    Weight as of this encounter: 70 kg (154 lb 5.2 oz)., PRESENT ON ADMISSION       # Financial/Environmental Concerns: none   # Pacemaker present             Disposition Plan     Medically Ready for Discharge: Anticipated in 5+ Days             Antonia Baptiste MD  Hospitalist Service  [facility]  Securely message with [employer] (more info)  Text page via [employer] Paging/Directory   ______________________________________________________________________    Interval History   --- doing ok post procedure yesterday  ---breathing about the same  ---self limiting some pain meds but encouraged her to take tylenol and any other non-narcoptics  ---denies nausea or vomiing  ---bowel ok    Physical Exam   Vital Signs: Temp: 97.6  F (36.4  C) Temp src: Oral BP: 129/67 Pulse: 83   Resp: 20 SpO2: 92 % O2 Device: Nasal cannula Oxygen Delivery: 4 LPM  Weight: 154 lbs 5.15 oz    General Appearance: Pleasant frail, NAD,   Respiratory: Relatively clear, mild rhonchi anteriorly.  " Chest tube noted  Cardiovascular: irregular sounding today  GI: Soft and nontender without hepatosplenomegaly, masses palpable.  Skin: no significant lower extremity edema.  Frail skin with senile purpura and tattoos noted without open areas.  Other: Anxious - .  Otherwise neuro grossly intact without focal deficits appreciated    Medical Decision Making             Data     I have personally reviewed the following data over the past 24 hrs:    11.9 (H)  \   10.0 (L)   / 271     143 106 15.9 /  139 (H)   4.5 32 (H) 0.51 \       Imaging results reviewed over the past 24 hrs:   Recent Results (from the past 24 hour(s))   XR Chest Port 1 View    Narrative    EXAM: XR CHEST PORT 1 VIEW  LOCATION: [facility]  DATE: 8/20/2024    INDICATION: Recheck chest tube and pleural effusions. Postop thoracotomy and empyema drainage.  COMPARISON: 8/19/2024 and older studies, CT chest 8/16/2024 and older studies      Impression    IMPRESSION: Postthoracotomy changes with large bore right-sided chest tube. Removal of the right basilar pigtail chest tube. Dual-lead left subclavian venous pacer, TAVR and right upper extremity PICC line catheter remain in good position.    There has been only partial reexpansion of the right upper lobe and apparently non of the right lower lobe. Likely small pneumothorax outlining collapsed lung in the right lateral costophrenic angle. Small right apical pleural cap persists. Volume loss   with marked shift of the mediastinum into the right chest has only slightly decreased. Minimal atelectasis in the left base behind the heart with trace effusion again noted. No signs of pneumonia or failure.

## 2024-08-20 NOTE — PLAN OF CARE
Bed: 07  Expected date:   Expected time:   Means of arrival:   Comments:  OL overdose    Problem: Gas Exchange Impaired  Goal: Optimal Gas Exchange  8/19/2024 2343 by Flor Bethea, RN  Outcome: Progressing  8/19/2024 1911 by Flor Bethea RN  Outcome: Progressing     Problem: Comorbidity Management  Goal: Blood Pressure in Desired Range  Outcome: Progressing  Intervention: Maintain Blood Pressure Management  Recent Flowsheet Documentation  Taken 8/19/2024 1817 by Flor Bethea, RN  Medication Review/Management: medications reviewed     Goal Outcome Evaluation:  VSS.  Tolerated oral meds without coughing with sips of water using chin tuck technique. A Flutter. BG acceptable. Digoxin, Diltiazem given as ordered. IV Abx and IV Steroids given. Chest tubes dressings reinforced. Chest tube output 160 ml, atrium marked. Incontinent. IVF infusing.

## 2024-08-20 NOTE — PLAN OF CARE
Problem: Gas Exchange Impaired  Goal: Optimal Gas Exchange  Outcome: Progressing     Problem: Comorbidity Management  Goal: Blood Pressure in Desired Range  Outcome: Progressing  Intervention: Maintain Blood Pressure Management  Recent Flowsheet Documentation  Taken 8/19/2024 1817 by Flor Bethea RN  Medication Review/Management: medications reviewed     Goal Outcome Evaluation:  Back from VATS, alert and oriented x4, kept at 4L NC 02 Sp02 low to mid 90's. Chest Tube -20 cm H2O to wall suction with clear reddish drainage. Ice pack applied, repositioned on left side for comfort. Pt refused to take clear liquids, mouth swabs for dry mouth and refused Tylenol for now. SCD on. LR stopped. Normal Saline infusing at 50 ml. Family members at bedside, interacting and answered their many questions.

## 2024-08-20 NOTE — TELEPHONE ENCOUNTER
Forms/Letter Request    Type of form/letter: OTHER: [facility]-Annual Oxygen order       Do we have the form/letter: Yes:     Who is the form from? [facility]-Annual Oxygen order (if other please explain)    Where did/will the form come from? form was faxed in    When is form/letter needed by: na    How would you like the form/letter returned: Fax : [phone]

## 2024-08-20 NOTE — PROGRESS NOTES
Complains of pain today.  Afebrile, vital signs stable.    Physical exam:  Serosanguineous drainage from chest tube.  No airleak.  Good breath sounds on the left.  No breath sounds on the right.    Laboratories:  White blood count 11.9  Hemoglobin 10    Impression: Postop day #1 VATS.  Unfortunately did not find any type of a true empyema or rind.  Her lung simply would not expand even with anesthesia only putting pressure on the right lung.  Have discussed with Dr. Mae.  I think she needs a repeat bronchoscopy, may be better to be done under general anesthetic so that he can get deep down onto the right side because clearly there is something blocking her bronchus.  None of the lobes however would inflate on the right so it has to be a fairly central block.  Have discussed this with the patient.  She also would prefer to wait to have that done tomorrow.  Make her n.p.o. after midnight.

## 2024-08-20 NOTE — PROGRESS NOTES
Speech-Language Pathology: Clinical Swallow Evaluation     08/20/24 0900   Appointment Info   Signing Clinician's Name / Credentials (SLP) Zahra Jose MA CCC-SLP   General Information   Onset of Illness/Injury or Date of Surgery 08/19/24   Referring Physician Dr. Baptiste   Pertinent History of Current Problem Per EMR: 74-year-old female with a past medical history of a flutter, COPD, ongoing tobacco abuse, previous pleural effusions and chronic pain admitted to the hospital with empyema; transferred from [facility] to [facility] for surgical management.     Empyema  --- Failing management with chest tube and lytics, CT showing larger size 8/16  --- Chest x-ray continues to show no change in opacification of the right hemithorax with volume loss   --- 8/17 transfer from [facility] to [facility] in anticipation of VATS procedure  --- General surgery and pulmonology following  --- Continue chest tube  --- VATS today  --- Underwent bronchoscopy 8/11 cultures showing E. coli and Candida albicans with pleural effusion cultures showing strep pneumo  --- ID following, changed antibiotics from ceftriaxone to Zosyn and fluconazole and recommended source control  --- Patient is at a moderate to high risk to undergo a moderate risk procedure.  Continue to optimize with scheduled nebs for 6 doses perioperatively given her chronic COPD.  Lab work has been stable.  ---stress dose steroids started today  --- Has been off Eliquis  --- Oxygen needs stable  --- Had SLP eval including video swallow when at [facility].  Note reviewed from 8/14.  I am going to post them to follow while here  Postop day #1 VATS.  Unfortunately did not find any type of a true empyema or rind.  Her lung simply would not expand even with anesthesia only putting pressure on the right lung.  Have discussed with Dr. Mae.  I think she needs a repeat bronchoscopy, may be better to be done under general anesthetic so that he can get deep down onto the  right side because clearly there is something blocking her bronchus.  None of the lobes however would inflate on the right so it has to be a fairly central block.  Have discussed this with the patient.  She also would prefer to wait to have that done tomorrow.  Make her n.p.o. after midnight.   General Observations Alert and cooperative but reports feeling quite ill following procedure yesterday   Type of Evaluation   Type of Evaluation Swallow Evaluation   Oral Motor   Oral Musculature generally intact   Mucosal Quality good   Dentition (Oral Motor)   Dentition (Oral Motor) edentulous;dental appliance/dentures  (upper dentures only)   Facial Symmetry (Oral Motor)   Facial Symmetry (Oral Motor) WNL   Lip Function (Oral Motor)   Lip Range of Motion (Oral Motor) WNL   Lip Strength (Oral Motor) WNL   Tongue Function (Oral Motor)   Tongue ROM (Oral Motor) WNL   Tongue Coordination/Speed (Oral Motor) WNL   Tongue Strength (Oral Motor) WNL   Jaw Function (Oral Motor)   Jaw Function (Oral Motor) WNL   Vocal Quality/Secretion Management (Oral Motor)   Vocal Quality (Oral Motor) WNL   Secretion Management (Oral Motor) WNL   General Swallowing Observations   Past History of Dysphagia VFSS at  in related admit: Deep laryngeal penetration occurred with first two trials of thin liquid. Notably, patient took large sips. Penetrated material did not fully clear from the laryngeal vestibule. The strategies of reduced bolus size and chin tuck posture did not eliminate laryngeal penetration, but did appear to reduce depth of penetration. Additionally, laryngeal penetration was largely transient with use of these strategies. Aspiration did not occur during this evaluation.   Comment, General Swallowing Observations Diet recommendation at transfer: Continue regular textures and thin liquids. Pt needs to be upright for all intake, chin tuck with liquids, slow rate, and small bites/sips. Please give pills in puree.   Swallowing  Evaluation Clinical swallow evaluation  (Completed via chart review, patient interview and assessment of strategies at bedside.)   Esophageal Phase of Swallow   Patient reports or presents with symptoms of esophageal dysphagia No   Swallowing Recommendations   Diet Consistency Recommendations regular diet;thin liquids (level 0)   Supervision Level for Intake distant supervision needed   Mode of Delivery Recommendations bolus size, small;slow rate of intake   Postural Recommendations chin tuck   Recommended Feeding/Eating Techniques (Swallow Eval) maintain upright sitting position for eating   Medication Administration Recommendations, Swallowing (SLP) Per patient preference   Instrumental Assessment Recommendations instrumental evaluation not recommended at this time   Comment, Swallowing Recommendations Continue regular textures and thin liquids. Pt needs to be upright for all intake, chin tuck with liquids, slow rate, and small bites/sips. Please give pills in puree. SLP to follow for ongoing treatment to assure use of strategies and diet tolerance.   General Therapy Interventions   Planned Therapy Interventions Dysphagia Treatment   Dysphagia treatment Instruction of safe swallow strategies;Compensatory strategies for swallowing   Clinical Impression   SLP Diagnosis Dysphagia   Risks & Benefits of therapy have been explained evaluation/treatment results reviewed;care plan/treatment goals reviewed;participants voiced agreement with care plan;participants included;patient   Clinical Impression Comments New consult following hospital transfer for VATS procedure. No oral intake trialed due to patient feeling ill and concerned for GI distress with further intake. She was able to recall previous swallow assessment and state swallow strategies. She denies any coughing/choking with oral intake. She was also able to state need for pills in puree. Patient would benefit from ongoing therapy per care plan.   SLP Total  Evaluation Time   Eval: oral/pharyngeal swallow function, clinical swallow Minutes (42351) 20   SLP Goals   Therapy Frequency (SLP Eval) 4 times/week   SLP Predicted Duration/Target Date for Goal Attainment 08/27/24   SLP Goals Swallow   SLP: Safely tolerate diet without signs/symptoms of aspiration Regular diet;Thin liquids;With use of compensatory swallow strategies;Independently  (chin tuck, small bites and sips)   Interventions   Interventions Quick Adds Swallowing Dysfunction   Swallowing Dysfunction &/or Oral Function for Feeding   Treatment of Swallowing Dysfunction &/or Oral Function for Feeding Minutes (31019) 8   Treatment Detail/Skilled Intervention Reviewed swallow strategies with patient. She was able to recall strategies IND during evaluation as noted above. Patient declined intake but demonstrated IND chin tuck x2 to show comprehension and ability to complete. She reports utilizing chin tuck with nearly all intake. Signage is posted and good carryover is noted thus far. Recommend 2-3 more dysphagia sessions for carryover of strategies with intake and larger quanities.   SLP Discharge Planning   SLP Plan Wed 0/4: diet f/u, ongoing strategy training and carryover of chin tuck   SLP Rationale for DC Rec No ST needs expected post-d/c   SLP Brief overview of current status  Recommend regular textures and thin liquids with chin tuck. SLP following for short course dysphagia management.

## 2024-08-20 NOTE — CONSULTS
Consultation - Infectious Disease  Mary Kay Tejada,  1950, MRN [id number]    Admitting Dx: Empyema  Empyema (H)    PCP: Cammy Bui, [phone]   Code status:  Full Code       Extended Emergency Contact Information  Primary Emergency Contact: Heriberto Tejada  Address: [address] [place] [address] [place]  Home Phone: [phone]  Mobile Phone: [phone]  Relation: Son  Secondary Emergency Contact: Kt Fermin  Address: [address]           [place] [address] [facility]  Mobile Phone: [phone]  Relation: Friend       ASSESSMENT   Mary Kay Tejada is a 74 year old old female with   History of COPD.  History of aortic stenosis status post TAVR.  Status post pacemaker placement  Recent history of COVID-19 infection complicated with secondary bacterial pneumonia in middle 2024.  Sputum cultures on 2024 with E. coli.  Right upper lobe collapse status post thoracentesis on 7/15/2024.  Readmitted with recurrent right-sided pleural effusion status post bronchoscopy on 2024.  Cultures with E. coli and Candida albicans.  Pleural effusion cultures positive with Streptococcus pneumonia.  Now s/p VATS, unable to inflate lung.     Yeast from BAL typically colonizer.      Discussion: Persistent right-sided empyema on appropriate antimicrobial therapy is an indication for surgery.  Unfortunately it does not appear the patient is candidate for surgery.  Antibiotics will not drain pleural effusion.  I will however optimize the antimicrobial therapy with changing ceftriaxone to Zosyn and adding IV fluconazole despite the fact that the Candida most likely represent contaminant.  Active Problems:    Empyema (H)       PLAN   Zosyn, fluc for now  Bronh tomorrow    Ceftriaxone should be adequate for pneumococcus and E coli. May not need to cover anaerobes as we do when pleural fluid grows microaerophilic strep such as strep anginosus.     Will  "follow.    Ravindra Bacon MD  [facility]  Contact via [employer] or [facility] [phone]  ______________________________________________________________________        Reason For Consult: Empyema ongoing care     HPI    We have been requested by Dr. Baptiste to evaluate Mary Kay Tejada. She had been seen by Dr. Trevino at [facility], his note is reviewed from 8/16. Transferred here for surgery and yesterday underwent VATS -- per note: \"it was quite low and I could feel just a lot of gelatinous type material in the thoracic cavity. I broke this up as best as I could with my finger and then placed a port followed by the camera. Immediately visible was the lung which was deflated but otherwise appeared normal. There was no peel around the lung at all. There was a lot of fibrinous exudate along the side of the chest wall that is able to break up easily. I placed another 5 mm port further up in the chest and using that was able to suction out much of the gelatinous type fluid in the lower chest.\"    Temps ok. Coughs, productive. Less pain. Breathing ok. Tolerating antibiotics.         Medical History  Past Medical History:   Diagnosis Date    Anemia     Aortic stenosis     Aortic valve disorder     Atrial fibrillation (H)     Atrial flutter (H)     Benign neoplasm of adenomatous polyp     large intestine     Chronic constipation     Chronic heart failure with preserved ejection fraction (H) 02/29/2024    Chronic pain syndrome     Congestive heart failure (H)     COPD (chronic obstructive pulmonary disease) (H)     Oxygen at night     Dependence on supplemental oxygen     Oxygen at noc, during the day as needed    Depression     Diabetes mellitus (H)     Dry eye syndrome     Fibromyalgia     Ganglion     left wrist    GERD (gastroesophageal reflux disease)     Hyperlipidemia     Hypertension     Hypokalemia     Infective otitis externa, unspecified     Created by Conversion     Larynx " "edema     Lung disease     Malignant neoplasm of vulva (H)     Created by [facility] Annotation: Apr 17 2007  8:24AM - Cammy Bui:  resection per Dr. Alfonso Mane 9/06;  Replacement Utility updated for latest IMO load    Medial epicondylitis     Onychomycosis     Osteoarthritis     Peptic ulcer     Polyneuropathy     Vulvar malignant neoplasm (H)     Surgical History  She  has a past surgical history that includes biopsy of breast, incisional; SUPRACERV ABD HYSTERECTOMY; Pr Ablate Heart Dysrhythm Focus; Hysterectomy; Esophagoscopy, gastroscopy, duodenoscopy (EGD), combined (N/A, 11/06/2018); joint replacement (Left); Colonoscopy (N/A, 06/14/2019); Biopsy breast (Right); Biopsy breast (Right, 01/28/2015); Biopsy breast (Right, 01/28/2015); PICC/Midline Placement (01/12/2023); Coronary Angiogram (N/A, 02/08/2024); Right Heart Catheterization (N/A, 02/08/2024); Left Heart Catheterization (N/A, 02/08/2024); Transcatheter Aortic Valve Replacement-Femoral Approach (N/A, 02/20/2024); Or Transcatheter Aortic Valve Replacement, Femoral Percutaneous Approach (Standby) (N/A, 02/20/2024); Transcatheter Aortic-Valve Replacement; Pacemaker Device & Lead Implant - HIS Lead Dual (N/A, 3/7/2024); PICC/Midline Placement (8/10/2024); and Video-Assisted Thoracoscopic Surgery (Vats) (Right, 8/19/2024).   Social History  Reviewed, and she  reports that she has been smoking cigarettes. She has never been exposed to tobacco smoke. She has never used smokeless tobacco. She reports current alcohol use. She reports that she does not use drugs.   Allergies  Allergies   Allergen Reactions    Celebrex [Celecoxib] Rash     patient had butterfly rash - \"lupus-like\"      Latex Rash    Family History  family history includes Alcoholism in her brother, father, sister, and sister; Cancer in her paternal uncle, paternal uncle and another family member; Heart Failure in her mother; No Known Problems in her daughter, maternal " "aunt, maternal grandfather, maternal grandmother, paternal aunt, paternal grandfather, and paternal grandmother.     Psychosocial Needs  Social History     Social History Narrative    Not on file     Additional psychosocial needs reviewed per nursing assessment.         Prior to Admission Medications   Medications Prior to Admission   Medication Sig Dispense Refill Last Dose    ACCU-CHEK SOFTCLIX LANCETS lancets [ACCU-CHEK SOFTCLIX LANCETS LANCETS] TEST THREE TIMES DAILY 300 each 3     albuterol (PROAIR HFA/PROVENTIL HFA/VENTOLIN HFA) 108 (90 Base) MCG/ACT inhaler INHALE 2 PUFFS INTO THE LUNGS EVERY 4 HOURS AS NEEDED FOR WHEEZING 13.4 g 1     apixaban ANTICOAGULANT (ELIQUIS) 5 MG tablet Take 1 tablet (5 mg) by mouth 2 times daily 180 tablet 2     atorvastatin (LIPITOR) 20 MG tablet TAKE 1 TABLET(20 MG) BY MOUTH AT BEDTIME 90 tablet 2     BD ULTRA-FINE SHORT PEN NEEDLE 31 gauge x 5/16\" Ndle [BD ULTRA-FINE SHORT PEN NEEDLE 31 GAUGE X 5/16\" NDLE] TEST FOUR TIMES DAILY WITH MEALS AND AT BEDTIME 400 each 3     blood glucose (ONETOUCH VERIO IQ) test strip TEST THREE TIMES DAILY 300 strip 1     blood-glucose meter (ONETOUCH VERIO IQ METER) Misc [BLOOD-GLUCOSE METER (ONETOUCH VERIO IQ METER) MISC] Check blood sugar three times a day. 1 each 0     budesonide-formoterol (SYMBICORT) 160-4.5 MCG/ACT Inhaler INHALE 2 PUFFS INTO THE LUNGS TWICE DAILY 10.2 g 4     diaper,brief,adult,disposable (ADULT BRIEFS - LARGE) Misc [DIAPER,BRIEF,ADULT,DISPOSABLE (ADULT BRIEFS - LARGE) MISC] Use 3-4 daily as needed for incontinence 120 each 6     diltiazem ER COATED BEADS (CARDIZEM CD/CARTIA XT) 120 MG 24 hr capsule TAKE 1 CAPSULE(120 MG) BY MOUTH DAILY 30 capsule 5     Emollient (EUCERIN ORIGINAL HEALING) LOTN APPLY LIBERALLY TO DRY SKIN TWICE DAILY AS NEEDED       empagliflozin (JARDIANCE) 10 MG TABS tablet Take 1 tablet (10 mg) by mouth daily 90 tablet 2     furosemide (LASIX) 20 MG tablet Take 2 tablets (40 mg) by mouth daily 180 tablet 1 "     gabapentin (NEURONTIN) 600 MG tablet TAKE 1 TABLET(600 MG) BY MOUTH THREE TIMES DAILY 270 tablet 2     generic lancets (FINGERSTIX LANCETS) [GENERIC LANCETS (FINGERSTIX LANCETS)] Dispense brand per patient's insurance at pharmacy discretion. 300 each 0     ipratropium - albuterol 0.5 mg/2.5 mg/3 mL (DUONEB) 0.5-2.5 (3) MG/3ML neb solution INHALE 1 VIAL VIA NEBULIZER EVERY 6 HOURS AS NEEDED FOR SHORTNESS OF BREATH OR WHEEZING 90 mL 0     metoprolol tartrate (LOPRESSOR) 50 MG tablet Take 1 tablet (50 mg) by mouth 2 times daily 180 tablet 1     naloxone (NARCAN) 4 MG/0.1ML nasal spray Spray 1 spray (4 mg) into one nostril alternating nostrils once as needed for opioid reversal every 2-3 minutes until assistance arrives 0.2 mL 0     Nutritional Supplements (ENSURE COMPLETE) LIQD Take 1 Can by mouth daily 7110 mL 11     nystatin (NYSTOP) 769835 UNIT/GM external powder APPLY TO THE AFFECTED AREA(S) 2-3 TIMES DAILY AS NEEDED 180 g 0     omeprazole (PRILOSEC) 40 MG DR capsule Take 40 mg by mouth daily       oxyCODONE IR (ROXICODONE) 10 MG tablet Take 1 tablet (10 mg) by mouth every 6 hours as needed for moderate to severe pain 120 tablet 0     polyethylene glycol (MIRALAX) 17 GM/Dose powder Take 17 g by mouth daily as needed for constipation       potassium chloride john ER (KLOR-CON M20) 20 MEQ CR tablet TAKE 1 TABLET(20 MEQ) BY MOUTH DAILY 90 tablet 2     sennosides (SENOKOT) 8.6 MG tablet Take 1 tablet by mouth 2 times daily       sucralfate (CARAFATE) 1 GM tablet CRUSH ONE TABLET AND MIX WITH A LLITTLE WATER AND SWALLOW FOUR TIMES DAILY 360 tablet 3           Anti-infectives: pip/tazo, fluc  On either zosyn or ceftri since 8/10  Cultures:     Susceptibility data from last 90 days.  Collected Specimen Info Organism Ampicillin Ampicillin/Sulbactam Azithromycin Cefepime Ceftazidime Ceftriaxone Ceftriaxone (meningitis) Ceftriaxone (non-meningitis) Ciprofloxacin Clindamycin Gentamicin Levofloxacin Meropenem   08/11/24  Bronchial Alveolar Lavage from Lung, Upper Lobe, Right Escherichia coli                  Normal anna marie                08/11/24 Bronchial Alveolar Lavage from Lung, Right Yeast                08/11/24 Bronchial Alveolar Lavage from Lung, Lower Lobe, Right Escherichia coli                  Normal anna marie                08/11/24 Bronchial Alveolar Lavage from Lung, Right Candida albicans                08/10/24 Sputum from Expectorate Streptococcus pneumoniae    S     S  S   S   S      Escherichia coli  S  S   S  S  S    S   S  S  S   08/10/24 Pleural fluid from Pleural Cavity, Right Streptococcus pneumoniae    S     S  S   S   S    07/13/24 Sputum from Expectorate Escherichia coli  S  S   S  S  S    S   S  S  S     Normal anna marie                  Collected Specimen Info Organism Penicillin (meningitis) Penicillin (non-meningitis) Penicillin(oral) Piperacillin/Tazobactam Tobramycin Trimethoprim/Sulfamethoxazole  Vancomycin   08/11/24 Bronchial Alveolar Lavage from Lung, Upper Lobe, Right Escherichia coli            Normal anna marie          08/11/24 Bronchial Alveolar Lavage from Lung, Right Yeast          08/11/24 Bronchial Alveolar Lavage from Lung, Lower Lobe, Right Escherichia coli            Normal anna marie          08/11/24 Bronchial Alveolar Lavage from Lung, Right Candida albicans          08/10/24 Sputum from Expectorate Streptococcus pneumoniae  S  S  S     S     Escherichia coli     S  S  S    08/10/24 Pleural fluid from Pleural Cavity, Right Streptococcus pneumoniae  S  S  S     S   07/13/24 Sputum from Expectorate Escherichia coli     S  S  S      Normal anna marie            Imaging: reviewed images          Review of Systems:  All other systems negative in detail except what is noted above. Physical Exam:  Temp:  [97  F (36.1  C)-98.1  F (36.7  C)] 97.9  F (36.6  C)  Pulse:  [] 82  Resp:  [11-20] 19  BP: (104-132)/(57-72) 105/60  SpO2:  [89 %-96 %] 93 %    GENERAL:  In no acute distress, is alert and oriented to  person, place, time. O2 NC.  EYES: No conjunctival injection, pupils equally reactive to light, extra-ocular movement intact  HEAD, EARS, NOSE, MOUTH, AND THROAT: Nontraumatic, mouth without oral ulcers.   RESPIRATORY: normal respiratory effort, chest tube  CARDIOVASCULAR: Regular rate and rhythm, normal S1 and S2, no murmurs, rubs, or gallops.  ABDOMEN: Soft, nontender, no masses.  Normal bowel sounds.  MUSCULOSKELETAL: No synovitis.  SKIN/HAIR/NAILS: No rashes, no signs of peripheral emboli.  NEUROLOGIC: Grossly intact.  Triple lumen PICC R UE       Pertinent Labs         Recent Labs   Lab 08/20/24  0406 08/19/24  0524 08/18/24  0704    144 140   CO2 32* 31* 34*   BUN 15.9 17.6 14.4       Lab Results   Component Value Date    ALT 31 08/18/2024    AST 20 08/18/2024    ALKPHOS 105 08/18/2024          CRP Inflammation   Date Value Ref Range Status   08/12/2024 158.00 (H) <5.00 mg/L Final        Pertinent Radiology  Radiology Results: Reviewed  XR Chest Port 1 View    Result Date: 8/20/2024  EXAM: XR CHEST PORT 1 VIEW LOCATION: [facility] DATE: 8/20/2024 INDICATION: Recheck chest tube and pleural effusions. Postop thoracotomy and empyema drainage. COMPARISON: 8/19/2024 and older studies, CT chest 8/16/2024 and older studies     IMPRESSION: Postthoracotomy changes with large bore right-sided chest tube. Removal of the right basilar pigtail chest tube. Dual-lead left subclavian venous pacer, TAVR and right upper extremity PICC line catheter remain in good position. There has been only partial reexpansion of the right upper lobe and apparently non of the right lower lobe. Likely small pneumothorax outlining collapsed lung in the right lateral costophrenic angle. Small right apical pleural cap persists. Volume loss with marked shift of the mediastinum into the right chest has only slightly decreased. Minimal atelectasis in the left base behind the heart with trace effusion again noted. No  signs of pneumonia or failure.    XR Chest Port 1 View    Result Date: 8/19/2024  EXAM: XR CHEST PORT 1 VIEW LOCATION: [facility] DATE: 8/19/2024 INDICATION: Recheck chest tube and pleural effusions COMPARISON: August 18, 2024     IMPRESSION: Stable pacemaker, TAVR, right chest tube, right PICC. Minimal change in near complete opacification of the right hemithorax with volume loss.    XR Chest Port 1 View    Result Date: 8/18/2024  EXAM: XR CHEST PORT 1 VIEW LOCATION: [facility] DATE: 8/18/2024 INDICATION: Recheck chest tube and pleural effusions COMPARISON: 8/17/2024     IMPRESSION: No change since yesterday. Small caliber chest tube projected at the right lung base unchanged. Complete opacification of the right hemithorax with volume loss unchanged. Right PICC line tip in low SVC. Transvenous pacemaker. Hyperinflation left lung.    POC US Chest B-Scan    Limited Bedside Lung Ultrasound, performed and interpreted by me. Indication: empyema and dyspnea With the patient positioned supine, right posterior and lateral lung fields were examined for evidence of thoracic free fluid, pulmonary consolidation, and pulmonary edema. Findings: moderate right pleural effusion noted, mostly free flowing. Some debris noted (plankton sign) Chest tube visualized in the pleural effusion. Right lung atelectasis noted. No obvious loculations although scanning was limited due to presence of dressing. IMPRESSION: moderate to large right pleural effusion with chest tube in place. No obvious loculations noted on this limited bedside pleural/lung POCUS.      XR Chest Port 1 View    Result Date: 8/17/2024  EXAM: XR CHEST PORT 1 VIEW LOCATION: [facility] DATE: 8/17/2024 INDICATION: Recheck chest tube and pleural effusions COMPARISON: CT chest without contrast 08/16/2024, chest radiograph 08/15/2024     IMPRESSION: Stable position of the right basilar pigtail chest  tube. Stable complete opacification of the right hemithorax with rib crowding compatible with large pleural effusion and associated collapse of the right lung. Right upper extremity PICC line  with tip overlying the mid aspect of the SVC. Cardiomediastinal silhouette is partially obscured by the above process however appears grossly stable. Aortic valve replacement. Stable position of the left chest pacemaker. Trace left pleural effusion. Streaky opacities at the left lung base favoring atelectasis. No pneumothorax. Unchanged osseous structures.    CT Chest w/o Contrast    Result Date: 8/16/2024  EXAM: CT CHEST W/O CONTRAST LOCATION: [facility] DATE: 8/16/2024 INDICATION: Recheck chest tube placement, clogged, etc. and recheck effusion given patient increase O2 and tachycardia COMPARISON: 8/13/2024 TECHNIQUE: CT chest without IV contrast. Multiplanar reformats were obtained. Dose reduction techniques were used. CONTRAST: None. FINDINGS: LUNGS AND PLEURA: Large right pleural effusion increased from previous. Complete collapse of the right lung. The right mainstem, upper and lower lobe bronchi are essentially completely occluded increased from previous. Single right chest tube. Small left pleural effusion with passive atelectasis left lung base slightly increased from previous. MEDIASTINUM/AXILLAE: Transvenous pacemaker. Aortic valve prosthesis. Right PICC line tip in SVC. CORONARY ARTERY CALCIFICATION: Severe. UPPER ABDOMEN: No significant finding. MUSCULOSKELETAL: Unremarkable.     IMPRESSION: 1.  Large right pleural effusion increased from previous despite the presence of the chest tube. 2.  Complete collapse of the right lung with complete opacification of the right upper lobe, right lower lobe and right mainstem bronchus increased from previous. 3.  Small left pleural effusion increased from previous.     XR Chest 1 View    Result Date: 8/15/2024  EXAM: XR CHEST 1 VIEW LOCATION: [facility] DATE: 8/15/2024 INDICATION: Treated for empyema, s p chest tube , follow up pleural effusion COMPARISON: CT chest 8/13/2024, chest radiograph 8/11/2024     IMPRESSION: Unchanged position of the pigtail chest tube in the right lung base with similar complete opacification of the right hemithorax. Trace left effusion with dependent atelectasis. Background emphysema. Cardiac silhouette and mediastinal contours  are mostly obscured. Aortic valve replacement. Left chest cardiac generator and leads are unchanged. Right upper extremity PICC tip terminates in the mid SVC.    XR Video Swallow with SLP or OT    Result Date: 8/14/2024  EXAM: XR VIDEO SWALLOW WITH SLP OR OT LOCATION: [facility] DATE: 8/14/2024 INDICATION: Difficulty swallowing. COMPARISON: None. TECHNIQUE: Routine swallow study with speech pathology using multiple barium thicknesses. RADIATION DOSE: Dose Area Product 23.86 microGy-m2 FINDINGS: Swallow study with Speech Pathology using multiple barium thicknesses. Penetration with thin liquids, which slightly improved with chin tuck maneuver and decreased swallow volumes. No definite aspiration visualized with trialed consistencies.     IMPRESSION: Penetration with thin liquids, however no aspiration visualized. Please see speech pathology report for further details and recommendations.    CT Chest w/o Contrast    Result Date: 8/14/2024  EXAM: CT CHEST W/O CONTRAST LOCATION: [facility] DATE: 8/13/2024 INDICATION: empyema s p chest tube, ?evacuated COMPARISON: 8/10/2024 TECHNIQUE: CT chest without IV contrast. Multiplanar reformats were obtained. Dose reduction techniques were used. CONTRAST: None. FINDINGS: LUNGS AND PLEURA: Right basilar pigtail pleural drain in place. Large right pleural effusion with complete atelectasis of the right lung. A few foci of gas in the pleural space. Small left pleural effusion. Emphysema. Mild frothy  secretions in trachea. Debris occludes the central right lung airways. MEDIASTINUM/AXILLAE: Stable left subclavian approach pacemaker and TAVR. Right PICC with tip near the cavoatrial junction. Rightward mediastinal shift. No lymphadenopathy. CORONARY ARTERY CALCIFICATION: Moderate. UPPER ABDOMEN: Unchanged thickening of the left adrenal gland. MUSCULOSKELETAL: Unremarkable     IMPRESSION: 1.  Large right pleural effusion with right lung collapse.     US Abdomen Limited    Result Date: 8/12/2024  EXAM: US ABDOMEN LIMITED LOCATION: [facility] DATE: 8/12/2024 INDICATION: Abnormal LFTs. COMPARISON: CT 8/10/2024 TECHNIQUE: Limited abdominal ultrasound. FINDINGS: Study is limited by a chest tube and a compression dressing on the right side. Within limitations, findings are as follows. GALLBLADDER: Limited evaluation is within normal limits. No gallstones, wall thickening, or pericholecystic fluid. Negative sonographic Ospina's sign. BILE DUCTS: No biliary dilatation. The common duct measures 7 mm. LIVER: Normal parenchyma with smooth contour. No focal mass. RIGHT KIDNEY: Not seen due to bowel gas. PANCREAS: The pancreas is largely obscured by overlying gas. No ascites.     IMPRESSION: 1.  Normal limited abdominal ultrasound within limitations detailed above.     Cardiac Device Check - Remote    Result Date: 8/12/2024  Encounter Type: alert remote pacemaker transmission for AT/AF >/=6hrs for 2 days. Device: [employer]. Pacing % /Programmed: AP 43%,  <1% at AAIR<=>DDDR 60/130 ppm. Lead(s): stable. Battery longevity: 14yrs, 3mo estimated. Presenting: Atrial flutter with ventricular sensing  bpm. Atrial high rates: since 4/29/24; 24 mode switch episodes, AT/AF appears to be in progress since 7/21/24 2:45PM, burden <1%, v-rates >/=120bpm ~95%. 19 fast A&V episodes, available EGMs appear to be RVR during AF. Anticoagulant: Eliquis. Ventricular High rates: since 4/29/24; None detected.  Comments: Normal device function. Plan: Routed to device RN for review. MICHELLE Padgett, Device Specialist ADD: Transmission reviewed, see EPIC for details. Zofia Hodges RN  Device follow up for the entirety of having the Pacemaker, based on best practices determined by [employer] and in Compliance with Medicare guidelines. Continue remote device monitoring per patient plan. I have reviewed and interpreted the device interrogation, settings, programming, and encounter summary. The device is functioning within normal device parameters. I agree with the current findings, assessment and plan.    XR Chest 1 View    Result Date: 8/11/2024  EXAM: XR CHEST 1 VIEW LOCATION: [facility] DATE: 08/11/2024 INDICATION: New chest tube placement. COMPARISON: 08/10/2024 CXR and CT chest.     IMPRESSION: Interval placement right basilar pleural drainage catheter. Previously seen large right pleural effusion has been nearly completely evacuated and the mid and lower right lung have partially reexpanded. No pneumothorax. Left lung clear. Mild cardiac enlargement. Transcatheter aortic valve replacement. Pacer anterior left chest wall with lead tips in the RA and RV. Right PICC tip RA/SVC junction.     XR Chest Port 1 View    Result Date: 8/10/2024  EXAM: XR CHEST PORT 1 VIEW LOCATION: [facility] DATE: 8/10/2024 INDICATION: RN placed PICC   verify tip placement COMPARISON: 8/10/2024     IMPRESSION: New right PICC with tip near the cavoatrial junction. Otherwise, no significant interval change.    XR Chest Port 1 View    Result Date: 8/10/2024  EXAM: XR CHEST PORTABLE 1 VIEW LOCATION: [facility] DATE: 08/10/2024 INDICATION: Status post right pigtail chest tube. COMPARISON: 08/10/2024 at 1010 hours.     IMPRESSION: No change in dual-lead cardiac pacer or TAVR. Right-sided chest tube has been placed since comparison study with decrease in the previously seen  large right pleural effusion. A moderate to large pleural effusion remains. There is likely overlying compressive atelectasis of the inferior portion of the right lung with concurrent infectious process not excluded. There is some indistinctness of the pulmonary interstitium involving the left lower lobe which is new from prior study and may reflect airway inflammation or edema.     CT Chest w Contrast    Result Date: 8/10/2024  EXAM: CT CHEST W CONTRAST LOCATION: [facility] DATE: 8/10/2024 INDICATION: SOB, large right-sided pleural effusion status post right pigtail COMPARISON: None. TECHNIQUE: CT chest with IV contrast. Multiplanar reformats were obtained. Dose reduction techniques were used. CONTRAST: 90 mL Isovue-370 FINDINGS: LUNGS AND PLEURA: Large right effusion. Pigtail catheter is at the anterior right apex without significant fluid surrounding it. Extensive compressive atelectasis throughout the right lung. Left lung clear. MEDIASTINUM/AXILLAE: No lymphadenopathy. No thoracic aneurysm. CORONARY ARTERY CALCIFICATION: Moderate. UPPER ABDOMEN: No acute findings. MUSCULOSKELETAL: No frankly destructive bony lesions.     IMPRESSION: 1.  Large right effusion and extensive compressive atelectasis versus less likely infiltrate in the right lung. 2.  The pigtail catheter tip is at the anterior apex, most of the fluid is at the base.        XR Chest Port 1 View    Result Date: 8/10/2024  EXAM: XR CHEST PORT 1 VIEW LOCATION: [facility] DATE: 8/10/2024 INDICATION: sob, hypoxic, history of prior ptx, eval for ptx, pna or edema COMPARISON: 7/17/2020     IMPRESSION: Large right pleural effusion. No pneumothorax. Left subclavian approach pacing device. Prosthetic aortic valve. Cardiac silhouette within normal limits. No acute osseous abnormality.    XR Chest Port 1 View    Result Date: 7/17/2024  EXAM: XR CHEST PORT 1 VIEW LOCATION: [facility]  DATE: 7/17/2024 INDICATION: Recheck pneumothorax COMPARISON: Portable chest radiograph 07/16/2024     IMPRESSION: Stable position of left chest pacemaker. Prior aortic valve replacement. Slightly decreased size of right hilar pneumothorax measuring 8 mm currently, previously 15 mm. Small right pleural effusion with adjacent compressive atelectasis. Streaky opacities at the left lung base, favoring atelectasis. Stable mildly enlarged cardiomediastinal silhouette. Mild central pulmonary vascular congestion. Unchanged osseous structures.    XR Chest Port 1 View    Result Date: 7/16/2024  EXAM: XR CHEST PORT 1 VIEW LOCATION: [facility] DATE: 7/16/2024 INDICATION: Follow up PTX COMPARISON: July 15, 2024 2107 hours and other recent prior exams.     IMPRESSION: Cardiac pacing device is again observed along with mediastinal shift toward the right. There is a subpulmonic right pneumothorax which appears unchanged or slightly smaller than on x-ray yesterday. The left lung is clear and free of pneumothorax.    XR Chest Port 1 View    Result Date: 7/15/2024  EXAM: XR CHEST PORT 1 VIEW LOCATION: [facility] DATE: 7/15/2024 INDICATION: follow up pneumothorax COMPARISON: 7/15/2024 at 5:34 PM and 7/14/2024.     IMPRESSION: Small to moderate size right pneumothorax not significantly changed from the study at 5:34 PM today. Stable focal opacity at the right base which may be a small amount of residual pleural effusion, atelectasis and/or infiltrate. Mild shift of  mediastinal structures to the right as before. Stable mild cardiomegaly. Normal pulmonary vascularity. Left subclavian pacemaker unchanged.    XR Chest Port 1 View    Result Date: 7/15/2024  EXAM: XR CHEST PORT 1 VIEW LOCATION: [facility] DATE: 7/15/2024 INDICATION: Right upper lobe collapse. COMPARISON: 7/14/2024.     IMPRESSION: New, small to moderate-sized right pneumothorax, greatest at the  peripheral right lung base. Previously large right pleural effusion is no longer present. Small amount of residual pleural fluid versus airspace disease is present at the right lung base. Continued imaging follow-up to resolution is recommended. No left pleural effusion or pneumothorax. Upper limits of normal heart size. Left chest wall cardiac implantable electronic device. Critical Result: Pneumothorax (new, increasing or tension) Finding was identified on 7/15/2024 5:55 PM CDT. Dr. Mendoza was contacted by me on 7/15/2024 6:25 PM CDT and verbalized understanding of the critical result.     US Thoracentesis    Result Date: 7/15/2024  EXAM: 1. RIGHT THORACENTESIS 2. ULTRASOUND GUIDANCE LOCATION: [facility] DATE: 7/15/2024 INDICATION: Pleural effusion. PROCEDURE: Informed consent obtained. Time out performed. The chest was prepped and draped in sterile fashion. 10 mL of 1 % lidocaine was infused into the local soft tissues. Under direct ultrasound guidance, a 5 [place] catheter system was placed into the pleural effusion. 0.9 liters of anders-colored fluid were removed and sent to lab, if requested. Patient tolerated procedure well. Ultrasound imaging was obtained and placed in the patient's permanent medical record.     IMPRESSION: Status post right ultrasound-guided thoracentesis. Reference CPT Code: 24796    XR Chest Port 1 View    Addendum Date: 7/14/2024    EXAM: XR CHEST PORT 1 VIEW LOCATION: [facility] DATE: 7/14/2024 INDICATION: Evaluate partial collapse COMPARISON: 7/13/2024 IMPRESSION: Increased atelectasis of the RIGHT lung with rightward shift of the mediastinum. Small right pleural effusion. Clear left lung. Left subclavian approach pacing device. Cardiac silhouette within normal limits. Prosthetic aortic valve.     Result Date: 7/14/2024  EXAM: XR CHEST PORT 1 VIEW LOCATION: [facility] DATE: 7/14/2024 INDICATION: Evaluate partial  collapse COMPARISON: 7/13/2024     IMPRESSION: Increased atelectasis of the left lung with rightward shift of the mediastinum. Small right pleural effusion. Clear left lung. Left subclavian approach pacing device. Cardiac silhouette within normal limits. Prosthetic aortic valve.    XR Chest Port 1 View    Result Date: 7/13/2024  EXAM: XR CHEST PORT 1 VIEW LOCATION: [facility] DATE: 7/13/2024 INDICATION: eval for interval change in lung collapse COMPARISON: CT chest 7/12/2024     IMPRESSION: Similar moderate right pleural effusion with adjacent opacity favoring atelectasis. Given differences in technique and single AP view, right upper lung aeration appears improved. Left lung remains clear. Cardiomediastinal silhouette, left chest wall cardiac device and TAVR.    CT Chest Pulmonary Embolism w Contrast    Result Date: 7/12/2024  EXAM: CT CHEST PULMONARY EMBOLISM W CONTRAST LOCATION: [facility] DATE: 7/12/2024 INDICATION: covid positive, hypoxia, eval for pneumonia, PE COMPARISON: 2/6/2024 TECHNIQUE: CT chest pulmonary angiogram during arterial phase injection of IV contrast. Multiplanar reformats and MIP reconstructions were performed. Dose reduction techniques were used. CONTRAST: 75 ML ISOVUE 370 FINDINGS: ANGIOGRAM CHEST: No pulmonary artery embolism. LUNGS AND PLEURA: Since February 2024, development of right upper lobe collapse with right upper lobe airway becoming completely opacified shortly after its origin. No significant change of middle and right lower lobe volume loss with patent middle and right lower lobe airways. Moderate right pleural effusion. No pneumothorax. MEDIASTINUM/AXILLAE: Compromised evaluation of right perihilar adenopathy from adjacent volume loss. No significant mediastinal or left perihilar adenopathy. Normal heart size. No pericardial effusion. Aortic valvular prosthetic. Dual-chamber pacer. Patulous esophagus. CORONARY ARTERY  CALCIFICATION: Moderate. UPPER ABDOMEN: No actionable findings. MUSCULOSKELETAL: Bony demineralization and degenerative changes.     IMPRESSION: 1.  No pulmonary embolism. 2.  Interval complete right upper lobe collapse with opacification of the proximal right upper lobe airway of indeterminate etiology. Recommend pulmonary follow-up and direct visualization or repeat chest CT to exclude an obstructing lesion. Retained airway debris. 3.  No significant change of moderate right pleural effusion, of indeterminate etiology. 4.  Moderately advanced emphysema.

## 2024-08-20 NOTE — PROGRESS NOTES
Care Management Follow Up    Length of Stay (days): 2    Expected Discharge Date: 08/21/2024     Concerns to be Addressed: Bronchoscopy, PT/OT recommendations     Patient plan of care discussed at interdisciplinary rounds: Yes    Anticipated Discharge Disposition:  TBD     Anticipated Discharge Services:    Anticipated Discharge DME:      Patient/family educated on Medicare website which has current facility and service quality ratings:    Education Provided on the Discharge Plan:    Patient/Family in Agreement with the Plan:      Referrals Placed by CM/SW:  none at this time  Private pay costs discussed: Not applicable    Additional Information:  Social history per previous CM note:   Pt resides in the home of her son and daughter-in-law Cynthia who is also pt's PCA. Family assists with I/ADL support both as family and as PCA as needed. No additional in-home services identified. Home O2 and ambulatory DME utilized.      Therapy recommendation at [facility], Patient clinically accepted at [facility] (will need PAS, transport TBD). New therapy consults have been placed, will await for these recommendations.    Per notes plan if for a bronchoscopy tomorrow.    RNCM to follow for medical progression, recommendations, and final discharge plan.      Keyanna Falcon RN

## 2024-08-20 NOTE — PLAN OF CARE
Problem: Gas Exchange Impaired  Goal: Optimal Gas Exchange  Outcome: Progressing  Intervention: Optimize Oxygenation and Ventilation  Recent Flowsheet Documentation  Taken 8/20/2024 0000 by Franci Gillespie RN  Head of Bed (HOB) Positioning: HOB at 20-30 degrees     Problem: Dysrhythmia  Goal: Normalized Cardiac Rhythm  Outcome: Progressing  Intervention: Monitor and Manage Cardiac Rhythm Effect  Recent Flowsheet Documentation  Taken 8/20/2024 0000 by Franci Gillespie RN  VTE Prevention/Management: SCDs on (sequential compression devices)     Problem: Comorbidity Management  Goal: Maintenance of COPD Symptom Control  Outcome: Progressing  Goal: Blood Glucose Levels Within Targeted Range  Outcome: Progressing  Goal: Blood Pressure in Desired Range  Outcome: Progressing   Goal Outcome Evaluation:    Pt is alert & oriented, endorses incisional pain, scheduled Tylenol given, effective. On 4 L NC, A flutter w/BBB on tele. Assist x 2. Normal saline running @ 50ml/hr & IV zosyn Running @ 25ml/hr. Chest tube draining clear reddish drainage. Pt refused repositioning and weigh check, will try again before end of shift.     K+ & Mg protocol reordered, recheck AM

## 2024-08-20 NOTE — PLAN OF CARE
Problem: Gas Exchange Impaired  Goal: Optimal Gas Exchange  Outcome: Progressing     Problem: Dysrhythmia  Goal: Normalized Cardiac Rhythm  Outcome: Progressing     Problem: Comorbidity Management  Goal: Maintenance of COPD Symptom Control  Outcome: Progressing   Goal Outcome Evaluation:    Patient was c/o pain at incisional site. Gave 0.2 mg Dilaudid IV. At reassessment patient reports not much change in pain level. Patient had scheduled Tylenol and she declined this. Spoke with MD who explained that the combination of the narcotic along with the Tylenol would help her and then she agreed to take it.   CT called to have us bring patient down for chest CT. We tried to get her up but she did not want to go. Patient asked why she needed it and this was explained to her so she agreed to go down but requesting that it be as late as possible today. Spoke with CT and they put her on there table time @ 1900. Patient agreeable to this time. Told patient that we would use the hover mat and zoom cart to take her so she wouldn't have to get herself up.   Patient declined to take her bowel meds and her Gabapentin. .  PICC dressing and caps changed.  Patient declined to order any food at all today. Only took small sips of water with her pills.  CT output 120cc canister level @ 900cc

## 2024-08-21 ENCOUNTER — ANESTHESIA (OUTPATIENT)
Dept: MEDSURG UNIT | Facility: HOSPITAL | Age: 74
End: 2024-08-21

## 2024-08-21 ENCOUNTER — MEDICAL CORRESPONDENCE (OUTPATIENT)
Dept: HEALTH INFORMATION MANAGEMENT | Facility: CLINIC | Age: 74
End: 2024-08-21

## 2024-08-21 ENCOUNTER — ANESTHESIA EVENT (OUTPATIENT)
Dept: MEDSURG UNIT | Facility: HOSPITAL | Age: 74
End: 2024-08-21

## 2024-08-21 LAB
ANION GAP SERPL CALCULATED.3IONS-SCNC: 8 MMOL/L (ref 7–15)
ATRIAL RATE - MUSE: 83 BPM
BUN SERPL-MCNC: 16 MG/DL (ref 8–23)
CALCIUM SERPL-MCNC: 8.4 MG/DL (ref 8.8–10.4)
CHLORIDE SERPL-SCNC: 106 MMOL/L (ref 98–107)
CREAT SERPL-MCNC: 0.49 MG/DL (ref 0.51–0.95)
DIASTOLIC BLOOD PRESSURE - MUSE: NORMAL MMHG
EGFRCR SERPLBLD CKD-EPI 2021: >90 ML/MIN/1.73M2
ERYTHROCYTE [DISTWIDTH] IN BLOOD BY AUTOMATED COUNT: 18.1 % (ref 10–15)
GLUCOSE BLDC GLUCOMTR-MCNC: 102 MG/DL (ref 70–99)
GLUCOSE BLDC GLUCOMTR-MCNC: 179 MG/DL (ref 70–99)
GLUCOSE BLDC GLUCOMTR-MCNC: 71 MG/DL (ref 70–99)
GLUCOSE BLDC GLUCOMTR-MCNC: 84 MG/DL (ref 70–99)
GLUCOSE SERPL-MCNC: 78 MG/DL (ref 70–99)
HCO3 SERPL-SCNC: 30 MMOL/L (ref 22–29)
HCT VFR BLD AUTO: 36.1 % (ref 35–47)
HGB BLD-MCNC: 10.1 G/DL (ref 11.7–15.7)
INTERPRETATION ECG - MUSE: NORMAL
MAGNESIUM SERPL-MCNC: 2.1 MG/DL (ref 1.7–2.3)
MCH RBC QN AUTO: 24.2 PG (ref 26.5–33)
MCHC RBC AUTO-ENTMCNC: 28 G/DL (ref 31.5–36.5)
MCV RBC AUTO: 87 FL (ref 78–100)
P AXIS - MUSE: 85 DEGREES
PATH REPORT.COMMENTS IMP SPEC: NORMAL
PATH REPORT.FINAL DX SPEC: NORMAL
PATH REPORT.GROSS SPEC: NORMAL
PATH REPORT.MICROSCOPIC SPEC OTHER STN: NORMAL
PLATELET # BLD AUTO: 273 10E3/UL (ref 150–450)
POTASSIUM SERPL-SCNC: 4 MMOL/L (ref 3.4–5.3)
PR INTERVAL - MUSE: 210 MS
QRS DURATION - MUSE: 92 MS
QT - MUSE: 336 MS
QTC - MUSE: 394 MS
R AXIS - MUSE: -57 DEGREES
RBC # BLD AUTO: 4.17 10E6/UL (ref 3.8–5.2)
SODIUM SERPL-SCNC: 144 MMOL/L (ref 135–145)
SYSTOLIC BLOOD PRESSURE - MUSE: NORMAL MMHG
T AXIS - MUSE: 103 DEGREES
VENTRICULAR RATE- MUSE: 83 BPM
WBC # BLD AUTO: 12.4 10E3/UL (ref 4–11)

## 2024-08-21 PROCEDURE — 88305 TISSUE EXAM BY PATHOLOGIST: CPT | Mod: 26 | Performed by: PATHOLOGY

## 2024-08-21 PROCEDURE — 250N000009 HC RX 250: Performed by: INTERNAL MEDICINE

## 2024-08-21 PROCEDURE — 250N000013 HC RX MED GY IP 250 OP 250 PS 637: Performed by: SPECIALIST

## 2024-08-21 PROCEDURE — 250N000012 HC RX MED GY IP 250 OP 636 PS 637: Performed by: FAMILY MEDICINE

## 2024-08-21 PROCEDURE — 88112 CYTOPATH CELL ENHANCE TECH: CPT | Mod: 26 | Performed by: PATHOLOGY

## 2024-08-21 PROCEDURE — 272N000735 HC KIT, CIRCUIT WITH NEB, VOLERA

## 2024-08-21 PROCEDURE — 250N000011 HC RX IP 250 OP 636: Performed by: SPECIALIST

## 2024-08-21 PROCEDURE — 99233 SBSQ HOSP IP/OBS HIGH 50: CPT | Performed by: STUDENT IN AN ORGANIZED HEALTH CARE EDUCATION/TRAINING PROGRAM

## 2024-08-21 PROCEDURE — 250N000011 HC RX IP 250 OP 636: Performed by: FAMILY MEDICINE

## 2024-08-21 PROCEDURE — 94640 AIRWAY INHALATION TREATMENT: CPT | Mod: 76

## 2024-08-21 PROCEDURE — 210N000001 HC R&B IMCU HEART CARE

## 2024-08-21 PROCEDURE — 93005 ELECTROCARDIOGRAM TRACING: CPT

## 2024-08-21 PROCEDURE — 83735 ASSAY OF MAGNESIUM: CPT | Performed by: STUDENT IN AN ORGANIZED HEALTH CARE EDUCATION/TRAINING PROGRAM

## 2024-08-21 PROCEDURE — 999N000157 HC STATISTIC RCP TIME EA 10 MIN

## 2024-08-21 PROCEDURE — 999N000141 HC STATISTIC PRE-PROCEDURE NURSING ASSESSMENT: Performed by: INTERNAL MEDICINE

## 2024-08-21 PROCEDURE — 258N000003 HC RX IP 258 OP 636: Performed by: ANESTHESIOLOGY

## 2024-08-21 PROCEDURE — 250N000013 HC RX MED GY IP 250 OP 250 PS 637: Performed by: FAMILY MEDICINE

## 2024-08-21 PROCEDURE — 99207 PR NO CHARGE LOS: CPT | Performed by: INTERNAL MEDICINE

## 2024-08-21 PROCEDURE — 80048 BASIC METABOLIC PNL TOTAL CA: CPT | Performed by: SPECIALIST

## 2024-08-21 PROCEDURE — 93010 ELECTROCARDIOGRAM REPORT: CPT | Performed by: STUDENT IN AN ORGANIZED HEALTH CARE EDUCATION/TRAINING PROGRAM

## 2024-08-21 PROCEDURE — 85027 COMPLETE CBC AUTOMATED: CPT | Performed by: FAMILY MEDICINE

## 2024-08-21 PROCEDURE — 94799 UNLISTED PULMONARY SVC/PX: CPT

## 2024-08-21 PROCEDURE — 99233 SBSQ HOSP IP/OBS HIGH 50: CPT | Performed by: INTERNAL MEDICINE

## 2024-08-21 PROCEDURE — 99231 SBSQ HOSP IP/OBS SF/LOW 25: CPT | Mod: 24

## 2024-08-21 RX ORDER — LIDOCAINE 40 MG/G
CREAM TOPICAL
Status: DISCONTINUED | OUTPATIENT
Start: 2024-08-21 | End: 2024-08-21 | Stop reason: HOSPADM

## 2024-08-21 RX ORDER — SODIUM CHLORIDE FOR INHALATION 3 %
3 VIAL, NEBULIZER (ML) INHALATION
Status: DISCONTINUED | OUTPATIENT
Start: 2024-08-21 | End: 2024-09-09 | Stop reason: HOSPADM

## 2024-08-21 RX ORDER — LEVALBUTEROL INHALATION SOLUTION 1.25 MG/3ML
1.25 SOLUTION RESPIRATORY (INHALATION) 4 TIMES DAILY
Status: DISCONTINUED | OUTPATIENT
Start: 2024-08-21 | End: 2024-08-24

## 2024-08-21 RX ORDER — SODIUM CHLORIDE, SODIUM LACTATE, POTASSIUM CHLORIDE, CALCIUM CHLORIDE 600; 310; 30; 20 MG/100ML; MG/100ML; MG/100ML; MG/100ML
INJECTION, SOLUTION INTRAVENOUS CONTINUOUS
Status: DISCONTINUED | OUTPATIENT
Start: 2024-08-21 | End: 2024-08-21 | Stop reason: HOSPADM

## 2024-08-21 RX ORDER — ONDANSETRON 2 MG/ML
4 INJECTION INTRAMUSCULAR; INTRAVENOUS EVERY 6 HOURS PRN
Status: DISCONTINUED | OUTPATIENT
Start: 2024-08-21 | End: 2024-08-21 | Stop reason: HOSPADM

## 2024-08-21 RX ADMIN — PIPERACILLIN AND TAZOBACTAM 3.38 G: 3; .375 INJECTION, POWDER, FOR SOLUTION INTRAVENOUS at 12:17

## 2024-08-21 RX ADMIN — ACETAMINOPHEN 650 MG: 325 TABLET ORAL at 14:23

## 2024-08-21 RX ADMIN — PANTOPRAZOLE SODIUM 40 MG: 40 TABLET, DELAYED RELEASE ORAL at 06:34

## 2024-08-21 RX ADMIN — SODIUM CHLORIDE, POTASSIUM CHLORIDE, SODIUM LACTATE AND CALCIUM CHLORIDE: 600; 310; 30; 20 INJECTION, SOLUTION INTRAVENOUS at 13:22

## 2024-08-21 RX ADMIN — METOPROLOL TARTRATE 50 MG: 25 TABLET, FILM COATED ORAL at 20:39

## 2024-08-21 RX ADMIN — LIDOCAINE 1 PATCH: 4 PATCH TOPICAL at 19:23

## 2024-08-21 RX ADMIN — GABAPENTIN 600 MG: 300 CAPSULE ORAL at 20:43

## 2024-08-21 RX ADMIN — SODIUM CHLORIDE SOLN NEBU 3% 3 ML: 3 NEBU SOLN at 21:04

## 2024-08-21 RX ADMIN — LEVALBUTEROL HYDROCHLORIDE 1.25 MG: 1.25 SOLUTION RESPIRATORY (INHALATION) at 21:04

## 2024-08-21 RX ADMIN — SENNOSIDES AND DOCUSATE SODIUM 1 TABLET: 8.6; 5 TABLET ORAL at 09:11

## 2024-08-21 RX ADMIN — DIGOXIN 125 MCG: 125 TABLET ORAL at 09:11

## 2024-08-21 RX ADMIN — FLUCONAZOLE 200 MG: 2 INJECTION, SOLUTION INTRAVENOUS at 19:22

## 2024-08-21 RX ADMIN — GABAPENTIN 600 MG: 300 CAPSULE ORAL at 12:17

## 2024-08-21 RX ADMIN — GABAPENTIN 600 MG: 300 CAPSULE ORAL at 09:10

## 2024-08-21 RX ADMIN — ACETAMINOPHEN 975 MG: 325 TABLET ORAL at 19:22

## 2024-08-21 RX ADMIN — SENNOSIDES AND DOCUSATE SODIUM 1 TABLET: 8.6; 5 TABLET ORAL at 20:39

## 2024-08-21 RX ADMIN — ACETAMINOPHEN 975 MG: 325 TABLET ORAL at 03:00

## 2024-08-21 RX ADMIN — SUCRALFATE 1 G: 1 TABLET ORAL at 06:34

## 2024-08-21 RX ADMIN — ATORVASTATIN CALCIUM 20 MG: 10 TABLET, FILM COATED ORAL at 20:43

## 2024-08-21 RX ADMIN — METOPROLOL TARTRATE 50 MG: 25 TABLET, FILM COATED ORAL at 09:08

## 2024-08-21 RX ADMIN — DILTIAZEM HYDROCHLORIDE 120 MG: 120 CAPSULE, EXTENDED RELEASE ORAL at 09:11

## 2024-08-21 RX ADMIN — SUCRALFATE 1 G: 1 TABLET ORAL at 16:21

## 2024-08-21 RX ADMIN — PIPERACILLIN AND TAZOBACTAM 3.38 G: 3; .375 INJECTION, POWDER, FOR SOLUTION INTRAVENOUS at 03:02

## 2024-08-21 RX ADMIN — ENOXAPARIN SODIUM 40 MG: 40 INJECTION SUBCUTANEOUS at 09:10

## 2024-08-21 RX ADMIN — ACETAMINOPHEN 975 MG: 325 TABLET ORAL at 09:09

## 2024-08-21 RX ADMIN — PREDNISONE 20 MG: 20 TABLET ORAL at 09:08

## 2024-08-21 RX ADMIN — PIPERACILLIN AND TAZOBACTAM 3.38 G: 3; .375 INJECTION, POWDER, FOR SOLUTION INTRAVENOUS at 20:39

## 2024-08-21 RX ADMIN — FLUTICASONE FUROATE AND VILANTEROL TRIFENATATE 1 PUFF: 200; 25 POWDER RESPIRATORY (INHALATION) at 09:12

## 2024-08-21 ASSESSMENT — ACTIVITIES OF DAILY LIVING (ADL)
ADLS_ACUITY_SCORE: 45
ADLS_ACUITY_SCORE: 45
ADLS_ACUITY_SCORE: 46
ADLS_ACUITY_SCORE: 45
ADLS_ACUITY_SCORE: 46
ADLS_ACUITY_SCORE: 46
ADLS_ACUITY_SCORE: 45
ADLS_ACUITY_SCORE: 46
ADLS_ACUITY_SCORE: 46
ADLS_ACUITY_SCORE: 45
ADLS_ACUITY_SCORE: 46

## 2024-08-21 ASSESSMENT — COPD QUESTIONNAIRES: COPD: 1

## 2024-08-21 NOTE — TELEPHONE ENCOUNTER
Forms completed by provider and faxed back to [facility]. Sent to [facility] [place] for scanning. Completing task.

## 2024-08-21 NOTE — PLAN OF CARE
"  Problem: Adult Inpatient Plan of Care  Goal: Plan of Care Review  Description: The Plan of Care Review/Shift note should be completed every shift.  The Outcome Evaluation is a brief statement about your assessment that the patient is improving, declining, or no change.  This information will be displayed automatically on your shift  note.  Outcome: Progressing  Flowsheets (Taken 8/20/2024 2200)  Plan of Care Reviewed With:   patient   family  Goal: Patient-Specific Goal (Individualized)  Description: You can add care plan individualizations to a care plan. Examples of Individualization might be:  \"Parent requests to be called daily at 9am for status\", \"I have a hard time hearing out of my right ear\", or \"Do not touch me to wake me up as it startles  me\".  Outcome: Progressing  Goal: Absence of Hospital-Acquired Illness or Injury  Outcome: Progressing  Intervention: Identify and Manage Fall Risk  Recent Flowsheet Documentation  Taken 8/20/2024 1600 by Mateo Zhu RN  Safety Promotion/Fall Prevention:   activity supervised   assistive device/personal items within reach   clutter free environment maintained   nonskid shoes/slippers when out of bed   toileting scheduled  Intervention: Prevent Skin Injury  Recent Flowsheet Documentation  Taken 8/20/2024 1903 by Mateo Zhu RN  Body Position: refuses positioning  Intervention: Prevent Infection  Recent Flowsheet Documentation  Taken 8/20/2024 1600 by Mateo Zhu RN  Infection Prevention: hand hygiene promoted  Goal: Optimal Comfort and Wellbeing  Outcome: Progressing  Intervention: Monitor Pain and Promote Comfort  Recent Flowsheet Documentation  Taken 8/20/2024 2000 by Mateo Zhu RN  Pain Management Interventions: medication offered but refused  Taken 8/20/2024 1913 by Mateo Zhu RN  Pain Management Interventions: (Only wanted tylenol)   medication (see MAR)   repositioned  Taken 8/20/2024 1600 by Mateo Zhu RN  Pain Management Interventions: " medication offered but refused  Goal: Readiness for Transition of Care  Outcome: Progressing    Goal Outcome Evaluation:      Plan of Care Reviewed With: patient, family    Pt A/O x 4. Vitally stable & saturating well on 5 LPM via NC - denies SOB while at rest. Pt continues to have frequent, congested, & non-productive cough, unable to expectorate her secretions. Tele shows A-flutter, rate controlled with no pacer spikes visualized. Uneventful shift - pt transported down for CT scan which was needed prior to bronchoscopy tomorrow. Pt has had very poor oral intake today - refused dinner stating that she had no appetite. Dressing for R-side chest tube was found to be saturated with serosanguinous drainage - dressing changed & has been CDI since. Pt c/o of R-sided incisional pain which was improved with tylenol & repositioning - refused opioids. No other complaints heard. Pt & family educated on purpose of bronchoscopy tomorrow & understands she will be NPO at midnight.     Mateo Zhu RN on 8/20/2024 at 10:30 PM

## 2024-08-21 NOTE — PROGRESS NOTES
[facility]    Medicine Progress Note - Hospitalist Service    Date of Admission:  8/17/2024    Assessment & Plan   74-year-old female with a past medical history of a flutter, COPD, ongoing tobacco abuse, previous pleural effusions and chronic pain admitted to the hospital with empyema; transferred from [facility] to [facility] for surgical management.     Empyema  --- s/p VATS - per surgery no true emphyema or rind found - no ability to expand lung  ---planning bronchoscopy today 08/21  --- Repeat CT 08/20 shows pasty right pleural effusion s/p chest tube placement, moderate likely loculated/complicated residual as well as small associated right pneumothorax  Reexpansion of right upper lobe with persistent collapse of right middle and lower lobe segments.  Significant retained secretions greater in the right lower lobe. Increasing left pleural effusion  --- having daily CXR   --- General surgery and pulmonology following  --- Continue chest tube, managed by surgery  --- first bronchoscopy 8/11 cultures showing E. coli and Candida albicans with pleural effusion cultures showing strep pneumo  --- ID followed at , changed antibiotics from ceftriaxone to Zosyn and fluconazole and recommended source control  --- holding  Eliquis  --- Oxygen needs stable  --- seen by SLP, recommend regular/thin liquids    Acute on chronic respiratory failure  Advanced COPD  Tobacco dependence  --- Has been on 5 L as opposed to usual 3 L at home  --- Has been on oral taper of steroids for her COPD  --- prednisone taper  --- Pulmonology following  --- encourage smoking cessation     Paroxysmal A-fib/a flutter; status post PPM  Aortic stenosis status post TAVR  Acute on chronic HFpEF  --- Previously  on Cardizem drip at   --- Currently on metoprolol and diltiazem and digoxin with hold parameters  --- She is off amiodarone due to QTc prolongation  --- Have been holding her Lasix  --- Cardiology signed off  "8/15  --echo 4/2024 with EF 55-60%   --- monitor on telemetry   --- mag ok     Deconditioning   --- Previously recommended to go to TCU and has bed there.  ---posting PT and OT for updated recs     Normocytic anemia; overall stable     Hyperlipidemia--statin ordered     Diabetes mellitus, type II  --- Not on home meds  --- Last A1c 6.2.  Will repeat  --- Blood sugars look okay  --- Continue insulin sliding scale          Diet: Fluid restriction 1800 ML FLUID  Regular Diet Adult    DVT Prophylaxis: PCDs - DOAC on hold   Chairez Catheter: Not present  Lines: PRESENT      PICC 08/10/24 Triple Lumen Right Brachial vein medial-Site Assessment: WDL      Cardiac Monitoring: None  Code Status: Full Code      Clinically Significant Risk Factors              # Hypoalbuminemia: Lowest albumin = 2.2 g/dL at 8/18/2024  7:04 AM, will monitor as appropriate     # Hypertension: Noted on problem list           # Overweight: Estimated body mass index is 25.53 kg/m  as calculated from the following:    Height as of 8/10/24: 1.651 m (5' 5\").    Weight as of this encounter: 69.6 kg (153 lb 7 oz)., PRESENT ON ADMISSION     # Financial/Environmental Concerns: none   # Pacemaker present             Disposition Plan     Medically Ready for Discharge: Anticipated in 2-4 Days             Lauren Willson MD  Hospitalist Service  [facility]  Securely message with M87 (more info)  Text page via [email or web address] Paging/Directory   ______________________________________________________________________    Interval History   Patient was examined at the bedside with family present, new to me today. States she feels congested. Pending Bronchoscopy today    Physical Exam   Vital Signs: Temp: 97.4  F (36.3  C) Temp src: Oral BP: 122/69 Pulse: 82   Resp: 20 SpO2: 98 % O2 Device: Oxymask Oxygen Delivery: 6 LPM  Weight: 153 lbs 7.04 oz    General Appearance:  Pleasant frail, NAD,   Respiratory: Relatively clear, mild rhonchi anteriorly.  " Chest tube noted  Cardiovascular: irregular sounding today  GI: Soft and nontender without hepatosplenomegaly, masses palpable.  Skin: no significant lower extremity edema.  Frail skin with senile purpura and tattoos noted without open areas.  Other:  Anxious - .  Otherwise neuro grossly intact without focal deficits appreciated    Medical Decision Making       55 MINUTES SPENT BY ME on the date of service doing chart review, history, exam, documentation & further activities per the note.      Data     I have personally reviewed the following data over the past 24 hrs:    12.4 (H)  \   10.1 (L)   / 273     144 106 16.0 /  102 (H)   4.0 30 (H) 0.49 (L) \       Imaging results reviewed over the past 24 hrs:   Recent Results (from the past 24 hour(s))   CT Chest w/o Contrast    Narrative    EXAM: CT CHEST W/O CONTRAST  LOCATION: [facility]  DATE: 8/20/2024    INDICATION: ?LLL mass or occlusion, unable to re expand lung.  COMPARISON: 8/16/2024  TECHNIQUE: CT chest without IV contrast. Multiplanar reformats were obtained. Dose reduction techniques were used. Lack of intravenous contrast significantly limits exam.  CONTRAST: None.    FINDINGS:   LUNGS AND PLEURA: Interval placement of large bore right-sided chest tube with decrease in right pleural effusion. There is a moderate loculated appearing residual. Small anterior pneumothorax now noted. There is improved aeration of the right upper   lobe, with persistent right middle lobe and lower lobe collapse and mediastinal shift. Centrilobular emphysematous change with an upper lobe predominance. Increasing, now moderate left pleural effusion with compressive basilar atelectasis. Moderate   retained secretions in the central airways. Significant retained secretions in right lower lobe bronchi.    MEDIASTINUM/AXILLAE: Right-sided PICC line terminates in the distal SVC. Cardiac pacemaker. TAVR.    CORONARY ARTERY CALCIFICATION: Severe.    UPPER  ABDOMEN: Right adrenal nodular hyperplasia. Significant enlargement of the left adrenal gland, potentially containing multiple low-attenuation nodules measuring up to 2 cm in size. Adenomatous hyperplasia favored.    MUSCULOSKELETAL: No acute bony abnormalities.      Impression    IMPRESSION:   1.  Decreased right pleural effusion status post chest tube placement, with moderate, likely loculated/complicated residual, as well as small associated right pneumothorax now evident.  2.  Reexpansion of the right upper lobe with persistent collapse of right middle and lower lobe segments. Significant retained secretions, greatest in the right lower lobe.  3.  Increasing left pleural effusion.  4.  Additional findings as above.

## 2024-08-21 NOTE — PLAN OF CARE
Goal Outcome Evaluation:       A&Ox4 pleasant and cooperative with cares. Bouncing between A flutter and Normal sinus rhythm. On 5LNC at beginning of the shift would de sat into the low 80's. On 6L Oxymask now with sats in the 90's denies shortness of breath. Chest tube remains in place dressing clean dry and intact. 115 mL serosanguineous drainage output this shift. Assist of two with cares. Purewick in place. Pain has been controlled with scheduled Tylenol. Patient has been NPO since midnight.        Problem: Gas Exchange Impaired  Goal: Optimal Gas Exchange  8/21/2024 0602 by Jeff Smith, RN  Outcome: Not Progressing  Problem: Dysrhythmia  Goal: Normalized Cardiac Rhythm  8/21/2024 0602 by Jeff Smith, RN  Outcome: Progressing  8/21/2024 0601 by Jeff Smith, RN  Outcome: Progressing     Problem: Risk for Delirium  Goal: Optimal Coping  Outcome: Progressing     Problem: Risk for Delirium  Goal: Improved Sleep  Outcome: Progressing

## 2024-08-21 NOTE — PROGRESS NOTES
"PULMONARY PROGRESS NOTE    Date / Time of Admission:  8/17/2024  1:56 PM  Assessment:   74yoF with severe emphysema on home O2 at 3L here with strep pneumoniae causing empyema. Unfortunately very difficult to get lung to re-expand. No rind seen during VATS 8/19 but lung is better inflated currently.     Unfortunately today while in [place], O2 worse and requiring 10L O2. This was up from the morning when I saw her. Betsy and I had a long conversation about proceeding with bronchoscopy but there would be potential that she would remain intubated. She did not want to proceed with bronchoscopy.     I explained our other option is to use aggressive bronchial hygiene. I think some of this is related to pain at the chest tube site so she is not coughing and clearing secretions.     Active Problems:    Empyema (H)      Advance Directives:  Full code    Plan:   Introduce Xopenex/hypertonic saline.  [place]. Trying on lowest settings to allow for patient to tolerate this (she is having a lot of pain and splinting but will need aggressive pain control).   Bronchoscopy could be pursued in the future if oxygenation improves with bronchial hygiene. CT does not seem to indicate that there is mass in the RLL and previous bronchoscopy did not show this.      Subjective:   HPI:  Mary Kay Tejada is a 74 year old female with history of Emphysema on home O2 who presented with pneumonia. CT revealed large effusion that did not drain well with chest tubes but fluid found emppyema. She had VATs done 8/19 that found no rind but very difficult to get lung to inflate.   Had bronchoscopy at [facility] that was unremarkable but lung still not fully expanded. VATs improved right-sided expansion.     Plan was for bronchoscopy today but too hypoxic to proceed as I could not guarantee patient would extubate.         Allergies:   Allergies   Allergen Reactions    Celebrex [Celecoxib] Rash     patient had butterfly rash - \"lupus-like\"      Latex Rash    "     MEDS:  Scheduled Meds:  Current Facility-Administered Medications   Medication Dose Route Frequency Provider Last Rate Last Admin    acetaminophen (TYLENOL) tablet 975 mg  975 mg Oral Q8H Renée Soriano MD   975 mg at 08/21/24 0909    [Held by provider] apixaban ANTICOAGULANT (ELIQUIS) tablet 5 mg  5 mg Oral BID Angela Kaiser MD        artificial saliva (BIOTENE MT) solution 1 spray  1 spray Mouth/Throat 4x Daily Renée Soriano MD   1 spray at 08/20/24 1211    atorvastatin (LIPITOR) tablet 20 mg  20 mg Oral At Bedtime Renée Soriano MD   20 mg at 08/20/24 2035    digoxin (LANOXIN) tablet 125 mcg  125 mcg Oral Daily Renée Soriano MD   125 mcg at 08/21/24 0911    diltiazem ER COATED BEADS (CARDIZEM CD/CARTIA XT) 24 hr capsule 120 mg  120 mg Oral Daily Renée Soriano MD   120 mg at 08/21/24 0911    enoxaparin ANTICOAGULANT (LOVENOX) injection 40 mg  40 mg Subcutaneous Q24H Renée Soriano MD   40 mg at 08/21/24 0910    fluconazole (DIFLUCAN) intermittent infusion 200 mg  200 mg Intravenous Q24H Renée Soriano  mL/hr at 08/20/24 1913 200 mg at 08/20/24 1913    fluticasone-vilanterol (BREO ELLIPTA) 200-25 MCG/ACT inhaler 1 puff  1 puff Inhalation Daily Renée Soriano MD   1 puff at 08/21/24 0912    gabapentin (NEURONTIN) capsule 600 mg  600 mg Oral TID Renée Soriano MD   600 mg at 08/21/24 1217    insulin aspart (NovoLOG) injection (RAPID ACTING)  1-10 Units Subcutaneous TID AC Renée Soriano MD   8 Units at 08/18/24 1713    insulin aspart (NovoLOG) injection (RAPID ACTING)  1-7 Units Subcutaneous At Bedtime Renée Soriano MD   4 Units at 08/18/24 2026    levalbuterol (XOPENEX) neb solution 1.25 mg  1.25 mg Nebulization 4x Daily Carmelita Buckley MD        Lidocaine (LIDOCARE) 4 % Patch 1 patch  1 patch Transdermal Q24h Antonia Baptiste MD   1 patch at 08/20/24 2033    metoprolol tartrate (LOPRESSOR) tablet 50 mg  50 mg Oral BID Renée Soriano MD   50 mg at 08/21/24 0908    pantoprazole (PROTONIX) EC  tablet 40 mg  40 mg Oral QAM AC Renée Soriano MD   40 mg at 08/21/24 0634    piperacillin-tazobactam (ZOSYN) 3.375 g vial to attach to  mL bag  3.375 g Intravenous Q8H Antonia Baptiste MD   3.375 g at 08/21/24 1217    polyethylene glycol (MIRALAX) Packet 17 g  17 g Oral Daily Renée Soriano MD        predniSONE (DELTASONE) tablet 20 mg  20 mg Oral Daily Antonia Baptiste MD   20 mg at 08/21/24 0908    Followed by    [START ON 8/25/2024] predniSONE (DELTASONE) tablet 10 mg  10 mg Oral Daily Antonia Baptiste MD        senna-docusate (SENOKOT-S/PERICOLACE) 8.6-50 MG per tablet 1 tablet  1 tablet Oral BID Renée Soriano MD   1 tablet at 08/21/24 0911    sodium chloride (NEBUSAL) 3 % neb solution 3 mL  3 mL Nebulization 4x daily Carmelita Buckley MD        sodium chloride (PF) 0.9% PF flush 10 mL  10 mL Intracatheter Q8H Lauren Willson MD   10 mL at 08/21/24 1416    sodium chloride (PF) 0.9% PF flush 10-40 mL  10-40 mL Intracatheter Q7 Days Renée Soriano MD   10 mL at 08/17/24 1703    sterile talc (STERITALC) powder for sclerosing 4 g  4 g INTRAPLEURAL Once Renée Soriano MD        sucralfate (CARAFATE) tablet 1 g  1 g Oral TID AC Renée Soriano MD   1 g at 08/21/24 1621     Continuous Infusions:  Current Facility-Administered Medications   Medication Dose Route Frequency Provider Last Rate Last Admin     PRN Meds:.  Current Facility-Administered Medications   Medication Dose Route Frequency Provider Last Rate Last Admin    acetaminophen (TYLENOL) tablet 650 mg  650 mg Oral Q4H PRN Renée Soriano MD   650 mg at 08/21/24 1423    Or    acetaminophen (TYLENOL) Suppository 650 mg  650 mg Rectal Q4H PRN Renée Soriano MD        [START ON 8/22/2024] acetaminophen (TYLENOL) tablet 650 mg  650 mg Oral Q4H PRN Renée Soriano MD        [Held by provider] albuterol (PROVENTIL HFA/VENTOLIN HFA) inhaler  2 puff Inhalation Q4H PRN Angela Kaiser MD        benzocaine-menthol (CHLORASEPTIC) 6-10 MG lozenge 1 lozenge  1 lozenge  Buccal Q1H PRN Renée Soriano MD        [START ON 8/22/2024] bisacodyl (DULCOLAX) suppository 10 mg  10 mg Rectal Daily PRN Renée Soriano MD        calcium carbonate (TUMS) chewable tablet 1,000 mg  1,000 mg Oral 4x Daily PRN Renée Soriano MD        glucose gel 15-30 g  15-30 g Oral Q15 Min PRN Renée Soriano MD        Or    dextrose 50 % injection 25-50 mL  25-50 mL Intravenous Q15 Min PRN Renée Soriano MD        Or    glucagon injection 1 mg  1 mg Subcutaneous Q15 Min PRN Renée Soriano MD        HYDROmorphone (DILAUDID) injection 0.2 mg  0.2 mg Intravenous Q2H PRN Renée Soriano MD   0.2 mg at 08/20/24 0746    Or    HYDROmorphone (DILAUDID) injection 0.4 mg  0.4 mg Intravenous Q2H PRN Renée Soriano MD        ipratropium - albuterol 0.5 mg/2.5 mg/3 mL (DUONEB) neb solution 3 mL  3 mL Nebulization Q4H PRN Antonia Baptiste MD        ipratropium - albuterol 0.5 mg/2.5 mg/3 mL (DUONEB) neb solution 3 mL  3 mL Nebulization Q4H PRN Renée Soriano MD        lidocaine (LMX4) cream   Topical Q1H PRN Renée Soriano MD        lidocaine 1 % 0.1-1 mL  0.1-1 mL Other Q1H PRN Renée Soriano MD        magnesium hydroxide (MILK OF MAGNESIA) suspension 30 mL  30 mL Oral Daily PRN Renée Soriano MD        melatonin tablet 1 mg  1 mg Oral At Bedtime PRN Renée Soriano MD        miconazole (MICATIN) 2 % powder   Topical TID PRN Renée Soriano MD        naloxone (NARCAN) injection 0.2 mg  0.2 mg Intravenous Q2 Min PRN Renée Soriano MD        naloxone (NARCAN) injection 0.2 mg  0.2 mg Intramuscular Q2 Min PRN Renée Soriano MD        naloxone (NARCAN) injection 0.4 mg  0.4 mg Intravenous Q2 Min PRN Renée Soriano MD        naloxone (NARCAN) injection 0.4 mg  0.4 mg Intramuscular Q2 Min PRN Renée Soriano MD        ondansetron (ZOFRAN ODT) ODT tab 4 mg  4 mg Oral Q6H PRN Renée Soriano MD        Or    ondansetron (ZOFRAN) injection 4 mg  4 mg Intravenous Q6H PRN Renée Soriano MD        polyethylene glycol (MIRALAX)  Packet 17 g  17 g Oral Daily PRN Renée Soriano MD        prochlorperazine (COMPAZINE) injection 5 mg  5 mg Intravenous Q6H PRN Renée Soriano MD        Or    prochlorperazine (COMPAZINE) tablet 5 mg  5 mg Oral Q6H PRN Renée Soriano MD        prochlorperazine (COMPAZINE) injection 5 mg  5 mg Intravenous Q6H PRN Renée Soriano MD        Or    prochlorperazine (COMPAZINE) tablet 5 mg  5 mg Oral Q6H PRN Renée Soriano MD        Or    prochlorperazine (COMPAZINE) suppository 12.5 mg  12.5 mg Rectal Q12H PRN Renée oSriano MD        senna-docusate (SENOKOT-S/PERICOLACE) 8.6-50 MG per tablet 1 tablet  1 tablet Oral BID PRN Renée Soriano MD        Or    senna-docusate (SENOKOT-S/PERICOLACE) 8.6-50 MG per tablet 2 tablet  2 tablet Oral BID PRN Renée Soriano MD        sodium chloride (PF) 0.9% PF flush 10-20 mL  10-20 mL Intracatheter q1 min prn Renée Soriano MD   10 mL at 08/21/24 1228    sodium chloride (PF) 0.9% PF flush 10-40 mL  10-40 mL Intracatheter Q1H PRN Renée Soriano MD   10 mL at 08/21/24 1231    sodium chloride (PF) 0.9% PF flush 3 mL  3 mL Intracatheter q1 min prRenée Bailon MD        sodium chloride (PF) 0.9% PF flush 3 mL  3 mL Intracatheter q1 min prRenée Bailon MD        traMADol (ULTRAM) tablet 50 mg  50 mg Oral Q6H PRN Renée Soriano MD           Objective:   VITALS:  /69 (BP Location: Left arm)   Pulse 82   Temp 97.4  F (36.3  C) (Oral)   Resp 20   Wt 69.6 kg (153 lb 7 oz)   LMP  (LMP Unknown)   SpO2 98%   BMI 25.53 kg/m    EXAM:    GENERAL APPEARANCE: Alert, no acute distress  HENT: Oral mucosa and posterior oropharynx normal, moist mucous membranes  NECK: No adenopathy,masses or thyromegaly  RESP: lungs clear to auscultation bilaterally  CV: regular rate and rhythm, no murmur, rub or gallop  ABDOMEN: normal bowel sounds, soft, nontender, no hepatosplenomegaly or other masses  LYMPHATICS: No cervical, or supraclavicular adenopathy  SKIN: no suspicious lesions or  jojo  NEURO: Alert, oriented x 3, speech and mentation normal        I&O:    Date 08/20/24 0700 - 08/21/24 0659   Shift 7309-1546 0218-6556 7510-0429 24 Hour Total   INTAKE   I.V. 216.67   216.67   Shift Total(mL/kg) 216.67(3.1)   216.67(3.1)   OUTPUT   Chest Tube(mL/kg) 120(1.71)   120(1.71)   Shift Total(mL/kg) 120(1.71)   120(1.71)   Weight (kg) 70 70 70 70       Data Review:    Imaging: personally reviewed  EXAM: CT CHEST W/O CONTRAST  LOCATION: [facility]  DATE: 8/20/2024     INDICATION: ?LLL mass or occlusion, unable to re expand lung.  COMPARISON: 8/16/2024  TECHNIQUE: CT chest without IV contrast. Multiplanar reformats were obtained. Dose reduction techniques were used. Lack of intravenous contrast significantly limits exam.  CONTRAST: None.     FINDINGS:   LUNGS AND PLEURA: Interval placement of large bore right-sided chest tube with decrease in right pleural effusion. There is a moderate loculated appearing residual. Small anterior pneumothorax now noted. There is improved aeration of the right upper   lobe, with persistent right middle lobe and lower lobe collapse and mediastinal shift. Centrilobular emphysematous change with an upper lobe predominance. Increasing, now moderate left pleural effusion with compressive basilar atelectasis. Moderate   retained secretions in the central airways. Significant retained secretions in right lower lobe bronchi.     MEDIASTINUM/AXILLAE: Right-sided PICC line terminates in the distal SVC. Cardiac pacemaker. TAVR.     CORONARY ARTERY CALCIFICATION: Severe.     UPPER ABDOMEN: Right adrenal nodular hyperplasia. Significant enlargement of the left adrenal gland, potentially containing multiple low-attenuation nodules measuring up to 2 cm in size. Adenomatous hyperplasia favored.     MUSCULOSKELETAL: No acute bony abnormalities.                                                                      IMPRESSION:   1.  Decreased right pleural effusion  status post chest tube placement, with moderate, likely loculated/complicated residual, as well as small associated right pneumothorax now evident.  2.  Reexpansion of the right upper lobe with persistent collapse of right middle and lower lobe segments. Significant retained secretions, greatest in the right lower lobe.  3.  Increasing left pleural effusion.  4.  Additional findings as above.      Results for orders placed or performed during the hospital encounter of 08/17/24   Basic metabolic panel   Result Value Ref Range    Sodium 144 135 - 145 mmol/L    Potassium 4.0 3.4 - 5.3 mmol/L    Chloride 106 98 - 107 mmol/L    Carbon Dioxide (CO2) 30 (H) 22 - 29 mmol/L    Anion Gap 8 7 - 15 mmol/L    Urea Nitrogen 16.0 8.0 - 23.0 mg/dL    Creatinine 0.49 (L) 0.51 - 0.95 mg/dL    GFR Estimate >90 >60 mL/min/1.73m2    Calcium 8.4 (L) 8.8 - 10.4 mg/dL    Glucose 78 70 - 99 mg/dL     Lab Results   Component Value Date    WBC 12.4 (H) 08/21/2024    HGB 10.1 (L) 08/21/2024    HCT 36.1 08/21/2024    MCV 87 08/21/2024     08/21/2024     Last Arterial Blood Gas:  Recent Labs   Lab 08/17/24  1804   O2PER 32

## 2024-08-21 NOTE — PLAN OF CARE
She continues to have SOB and desats with exertion. Continues to be on oxymask. Bronchoscopy was canceled due to desaturation. Pt reported incisional pain, taking Tylenol helps. Pt refused to be up in chair and frequently refuses repositioning in bed.     Goal Outcome Evaluation:      Plan of Care Reviewed With: patient, family    Overall Patient Progress: no changeOverall Patient Progress: no change

## 2024-08-21 NOTE — PROGRESS NOTES
ID chart check    Bronchoscopy today. Continuing to follow. On zosyn and fluconazole.     Ravindra Bacon MD

## 2024-08-21 NOTE — ANESTHESIA PREPROCEDURE EVALUATION
Anesthesia Pre-Procedure Evaluation    Patient: Mary Kay Tejada   MRN: [id number] : 1950        Procedure : Procedure(s):  BRONCHOSCOPY (Bronchus)           Past Medical History:   Diagnosis Date     Anemia      Aortic stenosis      Aortic valve disorder      Atrial fibrillation (H)      Atrial flutter (H)      Benign neoplasm of adenomatous polyp     large intestine      Chronic constipation      Chronic heart failure with preserved ejection fraction (H) 2024     Chronic pain syndrome      Congestive heart failure (H)      COPD (chronic obstructive pulmonary disease) (H)     Oxygen at night      Dependence on supplemental oxygen     Oxygen at noc, during the day as needed     Depression      Diabetes mellitus (H)      Dry eye syndrome      Fibromyalgia      Ganglion     left wrist     GERD (gastroesophageal reflux disease)      Hyperlipidemia      Hypertension      Hypokalemia      Infective otitis externa, unspecified     Created by Conversion      Larynx edema      Lung disease      Malignant neoplasm of vulva (H)     Created by Conversion [facility] Annotation: 2007  8:24AM - Cammy Bui:  resection per Dr. Alfonso Mane ;  Replacement Utility updated for latest IMO load     Medial epicondylitis      Onychomycosis      Osteoarthritis      Peptic ulcer      Polyneuropathy      Vulvar malignant neoplasm (H)       Past Surgical History:   Procedure Laterality Date     BIOPSY BREAST Right      BIOPSY BREAST Right 2015     BIOPSY BREAST Right 2015    Procedure: RIGHT BREAST BIOPSY AFTER WIRE LOCALIZATION AT 0940;  Surgeon: Renée Soriano MD;  Location: [facility];  Service:      BIOPSY OF BREAST, INCISIONAL      Description: Incisional Breast Biopsy;  Recorded: 2007;  Comments: benign     COLONOSCOPY N/A 2019    Procedure: COLONOSCOPY;  Surgeon: Eduardo Mora MD;  Location: [facility];  Service: Gastroenterology     CV CORONARY  ANGIOGRAM N/A 02/08/2024    Procedure: CV CORONARY ANGIOGRAM;  Surgeon: Moises Valencia MD;  Location: [facility] CV     CV LEFT HEART CATH N/A 02/08/2024    Procedure: Left Heart Catheterization;  Surgeon: Moises Valencia MD;  Location: [facility] LAB CV     CV RIGHT HEART CATH MEASUREMENTS RECORDED N/A 02/08/2024    Procedure: Right Heart Catheterization;  Surgeon: Moises Valencia MD;  Location: [facility] CV     CV TRANSCATHETER AORTIC VALVE REPLACEMENT-FEMORAL APPROACH N/A 02/20/2024    Procedure: Transcatheter Aortic Valve Replacement, possible cardiopulmonary bypass, possible surgical intervention;  Surgeon: Moises Valencia MD;  Location: [facility] CV     EP PACEMAKER DEVICE & IMPLANT- HIS LEAD DUAL N/A 3/7/2024    Procedure: Pacemaker Device & Lead Implant - HIS Lead Dual;  Surgeon: Donnell Leon MD;  Location: [facility] CV     ESOPHAGOSCOPY, GASTROSCOPY, DUODENOSCOPY (EGD), COMBINED N/A 11/06/2018    Procedure: ESOPHAGOGASTRODUODENOSCOPY;  Surgeon: Lit Fernando MD;  Location: [facility] [place];  Service:      HYSTERECTOMY       JOINT REPLACEMENT Left     TKA     OR TRANSCATHETER AORTIC VALVE REPLACEMENT, FEMORAL PERCUTANEOUS APPROACH (STANDBY) N/A 02/20/2024    Procedure: OR TRANSCATHETER AORTIC VALVE REPLACEMENT, FEMORAL PERCUTANEOUS APPROACH (STANDBY);  Surgeon: Ishmael Farias MD;  Location: [facility] CV     PICC TRIPLE LUMEN PLACEMENT  01/12/2023          PICC TRIPLE LUMEN PLACEMENT  8/10/2024     OH ABLATE HEART DYSRHYTHM FOCUS      Description: Catheter Ablation Atrial Fibrillation;  Recorded: 07/31/2012;  Comments: 7/24/12 PVI with Dr. Bansal to all 5 pulm veins and CTI fl ablation line as well.     TRANSCATHETER AORTIC-VALVE REPLACEMENT       VIDEO-ASSISTED THORACOSCOPIC SURGERY (VATS) Right 8/19/2024    Procedure: VIDEO-ASSISTED RIGHT THORACOSCOPIC SURGERY WITH RIGHT PLEURAL FLUID CYTOLOGY;  Surgeon: Renée Soriano  "MD RALF;  Location: [facility] [place] [facility] [place]     [facility] SUPRACERV ABD HYSTERECTOMY      Description: Supracervical Hysterectomy;  Proc Date: 01/01/1985;  Comments: some cervix left!; ovaries intact; done for bleeding      Allergies   Allergen Reactions     Celebrex [Celecoxib] Rash     patient had butterfly rash - \"lupus-like\"       Latex Rash      Social History     Tobacco Use     Smoking status: Some Days     Current packs/day: 0.25     Types: Cigarettes     Passive exposure: Never     Smokeless tobacco: Never     Tobacco comments:     seen by TTS inpatient on 3/31/22   Substance Use Topics     Alcohol use: Yes     Comment: Alcoholic Drinks/day: very little      Wt Readings from Last 1 Encounters:   08/21/24 69.6 kg (153 lb 7 oz)        Anesthesia Evaluation   Pt has had prior anesthetic.         ROS/MED HX  ENT/Pulmonary:     (+)                          COPD,              Neurologic:       Cardiovascular: Comment: Interpretation Summary     1. Normal left ventricular size and systolic performance with a visually  estimated ejection fraction of 55-60%.  2. There is a bio-prosthetic aortic valve (documented 26 mm Douglas Mai 3  Resilia tissue valve).  Â  Normal aortic valve prosthesis metrics with a mean systolic gradient of 8  mmHg and a peak anterograde velocity of 1.9 m/sec.  Â  No aortic insufficiency is detected.  3. Normal right ventricular size and systolic performance.  4. There is mild left atrial enlargement.      (+)  hypertension- -   -  - -      CHF       fainting (syncope). pacemaker,                   Previous cardiac testing     METS/Exercise Tolerance:     Hematologic:       Musculoskeletal:       GI/Hepatic:     (+) GERD,                   Renal/Genitourinary:       Endo:     (+) type I DM,              Obesity,       Psychiatric/Substance Use:       Infectious Disease: Comment: Recent history of COVID-19 infection complicated with secondary bacterial pneumonia in middle July 2024.  Sputum cultures on " "7/13/2024 with E. coli.      Malignancy:       Other:            Physical Exam    Airway        Mallampati: II    Neck ROM: full     Respiratory Devices and Support         Dental       (+) Minor Abnormalities - some fillings, tiny chips      Cardiovascular   cardiovascular exam normal          Pulmonary   pulmonary exam normal              OUTSIDE LABS:  CBC:   Lab Results   Component Value Date    WBC 12.4 (H) 08/21/2024    WBC 11.9 (H) 08/20/2024    HGB 10.1 (L) 08/21/2024    HGB 10.0 (L) 08/20/2024    HCT 36.1 08/21/2024    HCT 35.8 08/20/2024     08/21/2024     08/20/2024     BMP:   Lab Results   Component Value Date     08/21/2024     08/20/2024    POTASSIUM 4.0 08/21/2024    POTASSIUM 4.5 08/20/2024    CHLORIDE 106 08/21/2024    CHLORIDE 106 08/20/2024    CO2 30 (H) 08/21/2024    CO2 32 (H) 08/20/2024    BUN 16.0 08/21/2024    BUN 15.9 08/20/2024    CR 0.49 (L) 08/21/2024    CR 0.51 08/20/2024    GLC 84 08/21/2024    GLC 71 08/21/2024     COAGS:   Lab Results   Component Value Date    PTT 38 07/12/2024    INR 1.27 (H) 07/13/2024     POC: No results found for: \"BGM\", \"HCG\", \"HCGS\"  HEPATIC:   Lab Results   Component Value Date    ALBUMIN 2.2 (L) 08/18/2024    PROTTOTAL 5.2 (L) 08/18/2024    ALT 31 08/18/2024    AST 20 08/18/2024    ALKPHOS 105 08/18/2024    BILITOTAL 0.4 08/18/2024     OTHER:   Lab Results   Component Value Date    PH 7.36 02/08/2024    LACT 1.7 08/10/2024    A1C  08/18/2024      Comment:      Disregard results.   This is a corrected result. Previous result was 7.1 % on 8/18/2024 at  6:17 AM CDT    JOEY 8.4 (L) 08/21/2024    PHOS 3.6 07/14/2024    MAG 2.1 08/21/2024    LIPASE 12 (L) 02/06/2024    TSH 2.80 08/10/2024    CRP 0.5 01/12/2023       Anesthesia Plan    ASA Status:  3       Anesthesia Type: General.     - Airway: ETT              Consents    Anesthesia Plan(s) and associated risks, benefits, and realistic alternatives discussed. Questions answered and " patient/representative(s) expressed understanding.     - Discussed: Risks, Benefits and Alternatives for the PROCEDURE were discussed     - Discussed with:  Patient      - Extended Intubation/Ventilatory Support Discussed: No.      - Patient is DNR/DNI Status: No     Use of blood products discussed: No .     Postoperative Care    Pain management: Multi-modal analgesia.   PONV prophylaxis: Ondansetron (or other 5HT-3), Dexamethasone or Solumedrol     Comments:               Eduardo Lopez MD    I have reviewed the pertinent notes and labs in the chart from the past 30 days and (re)examined the patient.  Any updates or changes from those notes are reflected in this note.

## 2024-08-21 NOTE — OR NURSING
Pt was needing increased oxygen and was up to 10 liters via oxymask to sustain 91-92% sats. MDA contacted and MDA and surgeon to bedside to assess. Discussion with pt resulting in case being canceled. Pt is not in distress and denies SOB, Upper lobes are clear. Surgeon will contact RT to set up treatments to mobilize secretions.

## 2024-08-21 NOTE — PROGRESS NOTES
General Surgery Progress Note:    Hospital Day # 3    ASSESSMENT:  1. Empyema (H)    2. Atelectasis        Mary Kay Tejada is a 74 year old female with complicated PMH including HFpEF, COPD on 3L at home, A-fib on Eliquis, here with empyema most recently s/p VATS and 32 Fr chest tube 8/19 without full re-expansion of the right lung. Pulm planning for repeat bronchoscopy (first done 8/11) today to further evaluate for potential etiology of lung not expanding.    PLAN:  -Bronchoscopy via intensivist/pulm today  -Continue with chest tube to -20 cm H2O on continuous wall suction, okay to water seal with transporting but chest tube container MUST remain upright at all times  -surgery will follow  -Medical management per primary team    SUBJECTIVE:   Mary Kay Tejada was seen on rounds.  Patient states she is tired and is hoping that this bronchoscopy can be pushed until later.  She is not eager to complete this bronchoscopy as the other one was very uncomfortable and made her throat very sore, and she is already suffering with pain from this new chest tube.  Patient states it is very sore along the right chest wall.      Patient Vitals for the past 24 hrs:   BP Temp Temp src Pulse Resp SpO2 Weight   08/21/24 0817 114/66 97.6  F (36.4  C) Oral 82 20 92 % --   08/21/24 0500 -- -- -- -- -- 95 % --   08/21/24 0449 -- -- -- -- -- 93 % --   08/21/24 0430 -- -- -- -- -- (!) 85 % --   08/21/24 0412 -- -- -- -- -- 96 % --   08/21/24 0400 -- -- -- -- -- 94 % --   08/21/24 0323 -- -- -- -- -- 90 % --   08/21/24 0313 115/61 97.8  F (36.6  C) -- 82 18 (!) 84 % --   08/21/24 0304 -- -- -- -- -- -- 69.6 kg (153 lb 7 oz)   08/20/24 2330 -- -- -- -- -- 92 % --   08/20/24 2318 103/60 97.4  F (36.3  C) Oral 81 18 91 % --   08/20/24 2033 112/68 -- -- 82 -- -- --   08/20/24 1903 111/63 97.3  F (36.3  C) Oral 81 18 (!) 88 % --   08/20/24 1554 105/60 97.9  F (36.6  C) Oral 82 19 93 % --   08/20/24 1156 104/63 98.1  F (36.7  C) Oral 82 18  (!) 89 % --         PHYSICAL EXAM:  General: patient seen resting in bed in no acute distress, pleasant but tired appearing  Resp: no increased work of breathing, breathing comfortably on 5 L of oxygen using  Abdomen: Soft and nontender  Cardio: Regular rate and rhythm auscultated on exam at time of encounter  Pulm: Breath sounds mildly diminished in the right anterior upper fields, diminished in the right lower and mid fields upon auscultation.  Did not auscultate any rhonchi, rales.  Left anterior lung fields generally clear of rhonchi, wheezing, rales but emphysematous upon auscultation    Drains: Tenderness along the right chest wall, appears the dressing is clean, dry, intact and the tube is patent and draining.  The chest tube is -20 cmH2O to continuous wall suction.  Appears to be draining serosanguineous fluid in the tubing without clots.  When placed to waterseal I visualized titling in the container and no bubbling on exam.  Despite patient coughing.  Containers upright and tape to the floor    Extremities: No edema or cyanosis visualized on exam, no obvious deformities    08/20 0700 - 08/21 0659  In: 1215.67 [P.O.:290; I.V.:925.67]  Out: 510 [Urine:200]    Admission on 08/17/2024   Component Date Value    pH Venous 08/17/2024 7.41     pCO2 Venous 08/17/2024 56 (H)     pO2 Venous 08/17/2024 37     Bicarbonate Venous 08/17/2024 36 (H)     Base Excess/Deficit Veno* 08/17/2024 10.9 (H)     FIO2 08/17/2024 32     Oxyhemoglobin Venous 08/17/2024 63 (L)     O2 Sat, Venous 08/17/2024 64.4 (L)     GLUCOSE BY METER POCT 08/17/2024 228 (H)     GLUCOSE BY METER POCT 08/17/2024 239 (H)     Sodium 08/18/2024 140     Potassium 08/18/2024 4.0     Chloride 08/18/2024 103     Carbon Dioxide (CO2) 08/18/2024 34 (H)     Anion Gap 08/18/2024 3 (L)     Urea Nitrogen 08/18/2024 14.4     Creatinine 08/18/2024 0.50 (L)     GFR Estimate 08/18/2024 >90     Calcium 08/18/2024 8.5 (L)     Glucose 08/18/2024 143 (H)     Magnesium  08/18/2024      Hemoglobin A1C 08/18/2024      GLUCOSE BY METER POCT 08/18/2024 134 (H)     Magnesium 08/18/2024 2.1     Protein Total 08/18/2024 5.2 (L)     Albumin 08/18/2024 2.2 (L)     Bilirubin Total 08/18/2024 0.4     Alkaline Phosphatase 08/18/2024 105     AST 08/18/2024 20     ALT 08/18/2024 31     Bilirubin Direct 08/18/2024 <0.20     GLUCOSE BY METER POCT 08/18/2024 189 (H)     GLUCOSE BY METER POCT 08/18/2024 324 (H)     GLUCOSE BY METER POCT 08/18/2024 296 (H)     Sodium 08/19/2024 144     Potassium 08/19/2024 4.2     Chloride 08/19/2024 107     Carbon Dioxide (CO2) 08/19/2024 31 (H)     Anion Gap 08/19/2024 6 (L)     Urea Nitrogen 08/19/2024 17.6     Creatinine 08/19/2024 0.55     GFR Estimate 08/19/2024 >90     Calcium 08/19/2024 8.3 (L)     Glucose 08/19/2024 127 (H)     Magnesium 08/19/2024 2.2     WBC Count 08/19/2024 10.4     RBC Count 08/19/2024 4.26     Hemoglobin 08/19/2024 10.2 (L)     Hematocrit 08/19/2024 36.8     MCV 08/19/2024 86     MCH 08/19/2024 23.9 (L)     MCHC 08/19/2024 27.7 (L)     RDW 08/19/2024 17.5 (H)     Platelet Count 08/19/2024 274     GLUCOSE BY METER POCT 08/19/2024 126 (H)     GLUCOSE BY METER POCT 08/19/2024 138 (H)     ABO/RH(D) 08/19/2024 O POS     Antibody Screen 08/19/2024 Negative     SPECIMEN EXPIRATION DATE 08/19/2024 [id number]     GLUCOSE BY METER POCT 08/19/2024 159 (H)     GLUCOSE BY METER POCT 08/19/2024 199 (H)     GLUCOSE BY METER POCT 08/19/2024 150 (H)     Sodium 08/20/2024 143     Potassium 08/20/2024 4.5     Chloride 08/20/2024 106     Carbon Dioxide (CO2) 08/20/2024 32 (H)     Anion Gap 08/20/2024 5 (L)     Urea Nitrogen 08/20/2024 15.9     Creatinine 08/20/2024 0.51     GFR Estimate 08/20/2024 >90     Calcium 08/20/2024 8.1 (L)     Glucose 08/20/2024 138 (H)     Magnesium 08/20/2024 1.9     WBC Count 08/20/2024 11.9 (H)     RBC Count 08/20/2024 4.12     Hemoglobin 08/20/2024 10.0 (L)     Hematocrit 08/20/2024 35.8     MCV 08/20/2024 87     MCH  08/20/2024 24.3 (L)     MCHC 08/20/2024 27.9 (L)     RDW 08/20/2024 17.7 (H)     Platelet Count 08/20/2024 271     GLUCOSE BY METER POCT 08/20/2024 139 (H)     GLUCOSE BY METER POCT 08/20/2024 135 (H)     GLUCOSE BY METER POCT 08/20/2024 128 (H)     GLUCOSE BY METER POCT 08/20/2024 112 (H)     Sodium 08/21/2024 144     Potassium 08/21/2024 4.0     Chloride 08/21/2024 106     Carbon Dioxide (CO2) 08/21/2024 30 (H)     Anion Gap 08/21/2024 8     Urea Nitrogen 08/21/2024 16.0     Creatinine 08/21/2024 0.49 (L)     GFR Estimate 08/21/2024 >90     Calcium 08/21/2024 8.4 (L)     Glucose 08/21/2024 78     WBC Count 08/21/2024 12.4 (H)     RBC Count 08/21/2024 4.17     Hemoglobin 08/21/2024 10.1 (L)     Hematocrit 08/21/2024 36.1     MCV 08/21/2024 87     MCH 08/21/2024 24.2 (L)     MCHC 08/21/2024 28.0 (L)     RDW 08/21/2024 18.1 (H)     Platelet Count 08/21/2024 273     Ventricular Rate 08/21/2024 83     Atrial Rate 08/21/2024 83     ID Interval 08/21/2024 210     QRS Duration 08/21/2024 92     QT 08/21/2024 336     QTc 08/21/2024 394     P Axis 08/21/2024 85     R AXIS 08/21/2024 -57     T Axis 08/21/2024 103     Interpretation ECG 08/21/2024                      Value:Atrial flutter  Left axis deviation  Pulmonary disease pattern  Nonspecific ST and T wave abnormality  Abnormal ECG  When compared with ECG of 12-Aug-2024 10:57,  Vent. rate has decreased by  56 bpm  ST now depressed in Inferior leads  Confirmed by SHOSHANA GARCIA MD LOC:[facility] ([id number]) on 8/21/2024 8:17:11 AM      Magnesium 08/21/2024 2.1     GLUCOSE BY METER POCT 08/21/2024 71         [facility]  [facility]  [address] [phone]

## 2024-08-22 ENCOUNTER — APPOINTMENT (OUTPATIENT)
Dept: OCCUPATIONAL THERAPY | Facility: HOSPITAL | Age: 74
DRG: 166 | End: 2024-08-22
Attending: FAMILY MEDICINE
Payer: COMMERCIAL

## 2024-08-22 ENCOUNTER — APPOINTMENT (OUTPATIENT)
Dept: SPEECH THERAPY | Facility: HOSPITAL | Age: 74
DRG: 166 | End: 2024-08-22
Attending: FAMILY MEDICINE
Payer: COMMERCIAL

## 2024-08-22 ENCOUNTER — APPOINTMENT (OUTPATIENT)
Dept: PHYSICAL THERAPY | Facility: HOSPITAL | Age: 74
DRG: 166 | End: 2024-08-22
Attending: FAMILY MEDICINE
Payer: COMMERCIAL

## 2024-08-22 ENCOUNTER — APPOINTMENT (OUTPATIENT)
Dept: RADIOLOGY | Facility: HOSPITAL | Age: 74
DRG: 166 | End: 2024-08-22
Payer: COMMERCIAL

## 2024-08-22 LAB
ANION GAP SERPL CALCULATED.3IONS-SCNC: 6 MMOL/L (ref 7–15)
BASOPHILS # BLD AUTO: 0 10E3/UL (ref 0–0.2)
BASOPHILS NFR BLD AUTO: 0 %
BUN SERPL-MCNC: 22.4 MG/DL (ref 8–23)
CALCIUM SERPL-MCNC: 8.4 MG/DL (ref 8.8–10.4)
CHLORIDE SERPL-SCNC: 106 MMOL/L (ref 98–107)
CREAT SERPL-MCNC: 0.56 MG/DL (ref 0.51–0.95)
EGFRCR SERPLBLD CKD-EPI 2021: >90 ML/MIN/1.73M2
EOSINOPHIL # BLD AUTO: 0 10E3/UL (ref 0–0.7)
EOSINOPHIL NFR BLD AUTO: 0 %
ERYTHROCYTE [DISTWIDTH] IN BLOOD BY AUTOMATED COUNT: 17.9 % (ref 10–15)
GLUCOSE BLDC GLUCOMTR-MCNC: 104 MG/DL (ref 70–99)
GLUCOSE BLDC GLUCOMTR-MCNC: 192 MG/DL (ref 70–99)
GLUCOSE BLDC GLUCOMTR-MCNC: 205 MG/DL (ref 70–99)
GLUCOSE BLDC GLUCOMTR-MCNC: 298 MG/DL (ref 70–99)
GLUCOSE SERPL-MCNC: 114 MG/DL (ref 70–99)
HCO3 SERPL-SCNC: 30 MMOL/L (ref 22–29)
HCT VFR BLD AUTO: 34.1 % (ref 35–47)
HGB BLD-MCNC: 9.7 G/DL (ref 11.7–15.7)
IMM GRANULOCYTES # BLD: 0.1 10E3/UL
IMM GRANULOCYTES NFR BLD: 1 %
LYMPHOCYTES # BLD AUTO: 0.9 10E3/UL (ref 0.8–5.3)
LYMPHOCYTES NFR BLD AUTO: 8 %
MAGNESIUM SERPL-MCNC: 2 MG/DL (ref 1.7–2.3)
MCH RBC QN AUTO: 24.4 PG (ref 26.5–33)
MCHC RBC AUTO-ENTMCNC: 28.4 G/DL (ref 31.5–36.5)
MCV RBC AUTO: 86 FL (ref 78–100)
MONOCYTES # BLD AUTO: 0.8 10E3/UL (ref 0–1.3)
MONOCYTES NFR BLD AUTO: 7 %
NEUTROPHILS # BLD AUTO: 9 10E3/UL (ref 1.6–8.3)
NEUTROPHILS NFR BLD AUTO: 83 %
NRBC # BLD AUTO: 0 10E3/UL
NRBC BLD AUTO-RTO: 0 /100
PLATELET # BLD AUTO: 272 10E3/UL (ref 150–450)
POTASSIUM SERPL-SCNC: 4 MMOL/L (ref 3.4–5.3)
RBC # BLD AUTO: 3.97 10E6/UL (ref 3.8–5.2)
SODIUM SERPL-SCNC: 142 MMOL/L (ref 135–145)
WBC # BLD AUTO: 10.7 10E3/UL (ref 4–11)

## 2024-08-22 PROCEDURE — 272N000202 HC AEROBIKA WITH MANOMETER

## 2024-08-22 PROCEDURE — 250N000012 HC RX MED GY IP 250 OP 636 PS 637: Performed by: SPECIALIST

## 2024-08-22 PROCEDURE — 99232 SBSQ HOSP IP/OBS MODERATE 35: CPT | Performed by: INTERNAL MEDICINE

## 2024-08-22 PROCEDURE — 97530 THERAPEUTIC ACTIVITIES: CPT | Mod: GP

## 2024-08-22 PROCEDURE — 97530 THERAPEUTIC ACTIVITIES: CPT | Mod: GO

## 2024-08-22 PROCEDURE — 94640 AIRWAY INHALATION TREATMENT: CPT

## 2024-08-22 PROCEDURE — 250N000013 HC RX MED GY IP 250 OP 250 PS 637: Performed by: FAMILY MEDICINE

## 2024-08-22 PROCEDURE — 250N000009 HC RX 250: Performed by: INTERNAL MEDICINE

## 2024-08-22 PROCEDURE — 97166 OT EVAL MOD COMPLEX 45 MIN: CPT | Mod: GO

## 2024-08-22 PROCEDURE — 250N000013 HC RX MED GY IP 250 OP 250 PS 637: Performed by: SPECIALIST

## 2024-08-22 PROCEDURE — 92526 ORAL FUNCTION THERAPY: CPT | Mod: GN

## 2024-08-22 PROCEDURE — 99233 SBSQ HOSP IP/OBS HIGH 50: CPT | Performed by: STUDENT IN AN ORGANIZED HEALTH CARE EDUCATION/TRAINING PROGRAM

## 2024-08-22 PROCEDURE — 250N000013 HC RX MED GY IP 250 OP 250 PS 637: Performed by: STUDENT IN AN ORGANIZED HEALTH CARE EDUCATION/TRAINING PROGRAM

## 2024-08-22 PROCEDURE — 83735 ASSAY OF MAGNESIUM: CPT | Performed by: STUDENT IN AN ORGANIZED HEALTH CARE EDUCATION/TRAINING PROGRAM

## 2024-08-22 PROCEDURE — 94640 AIRWAY INHALATION TREATMENT: CPT | Mod: 76

## 2024-08-22 PROCEDURE — 250N000011 HC RX IP 250 OP 636: Performed by: FAMILY MEDICINE

## 2024-08-22 PROCEDURE — 999N000157 HC STATISTIC RCP TIME EA 10 MIN

## 2024-08-22 PROCEDURE — 120N000013 HC R&B IMCU

## 2024-08-22 PROCEDURE — 80048 BASIC METABOLIC PNL TOTAL CA: CPT | Performed by: SPECIALIST

## 2024-08-22 PROCEDURE — 250N000011 HC RX IP 250 OP 636: Performed by: SPECIALIST

## 2024-08-22 PROCEDURE — 97162 PT EVAL MOD COMPLEX 30 MIN: CPT | Mod: GP

## 2024-08-22 PROCEDURE — 85025 COMPLETE CBC W/AUTO DIFF WBC: CPT | Performed by: INTERNAL MEDICINE

## 2024-08-22 PROCEDURE — 99231 SBSQ HOSP IP/OBS SF/LOW 25: CPT | Mod: 24

## 2024-08-22 PROCEDURE — 999N000065 XR CHEST PORT 1 VIEW

## 2024-08-22 PROCEDURE — 250N000012 HC RX MED GY IP 250 OP 636 PS 637: Performed by: FAMILY MEDICINE

## 2024-08-22 RX ORDER — FUROSEMIDE 20 MG
20 TABLET ORAL DAILY
Status: DISCONTINUED | OUTPATIENT
Start: 2024-08-22 | End: 2024-08-23

## 2024-08-22 RX ADMIN — SODIUM CHLORIDE SOLN NEBU 3% 3 ML: 3 NEBU SOLN at 07:24

## 2024-08-22 RX ADMIN — LIDOCAINE 1 PATCH: 4 PATCH TOPICAL at 20:50

## 2024-08-22 RX ADMIN — PIPERACILLIN AND TAZOBACTAM 3.38 G: 3; .375 INJECTION, POWDER, FOR SOLUTION INTRAVENOUS at 04:59

## 2024-08-22 RX ADMIN — POLYETHYLENE GLYCOL 3350 17 G: 17 POWDER, FOR SOLUTION ORAL at 09:03

## 2024-08-22 RX ADMIN — PANTOPRAZOLE SODIUM 40 MG: 40 TABLET, DELAYED RELEASE ORAL at 09:20

## 2024-08-22 RX ADMIN — LEVALBUTEROL HYDROCHLORIDE 1.25 MG: 1.25 SOLUTION RESPIRATORY (INHALATION) at 11:42

## 2024-08-22 RX ADMIN — LEVALBUTEROL HYDROCHLORIDE 1.25 MG: 1.25 SOLUTION RESPIRATORY (INHALATION) at 15:33

## 2024-08-22 RX ADMIN — PREDNISONE 20 MG: 20 TABLET ORAL at 09:03

## 2024-08-22 RX ADMIN — PIPERACILLIN AND TAZOBACTAM 3.38 G: 3; .375 INJECTION, POWDER, FOR SOLUTION INTRAVENOUS at 12:35

## 2024-08-22 RX ADMIN — LEVALBUTEROL HYDROCHLORIDE 1.25 MG: 1.25 SOLUTION RESPIRATORY (INHALATION) at 07:23

## 2024-08-22 RX ADMIN — DIGOXIN 125 MCG: 125 TABLET ORAL at 09:04

## 2024-08-22 RX ADMIN — FUROSEMIDE 20 MG: 20 TABLET ORAL at 16:39

## 2024-08-22 RX ADMIN — DILTIAZEM HYDROCHLORIDE 120 MG: 120 CAPSULE, EXTENDED RELEASE ORAL at 09:03

## 2024-08-22 RX ADMIN — SODIUM CHLORIDE SOLN NEBU 3% 3 ML: 3 NEBU SOLN at 20:09

## 2024-08-22 RX ADMIN — GABAPENTIN 600 MG: 300 CAPSULE ORAL at 09:07

## 2024-08-22 RX ADMIN — ATORVASTATIN CALCIUM 20 MG: 10 TABLET, FILM COATED ORAL at 21:02

## 2024-08-22 RX ADMIN — INSULIN ASPART 4 UNITS: 100 INJECTION, SOLUTION INTRAVENOUS; SUBCUTANEOUS at 21:48

## 2024-08-22 RX ADMIN — Medication 1 SPRAY: at 20:58

## 2024-08-22 RX ADMIN — PIPERACILLIN AND TAZOBACTAM 3.38 G: 3; .375 INJECTION, POWDER, FOR SOLUTION INTRAVENOUS at 21:02

## 2024-08-22 RX ADMIN — FLUTICASONE FUROATE AND VILANTEROL TRIFENATATE 1 PUFF: 200; 25 POWDER RESPIRATORY (INHALATION) at 09:02

## 2024-08-22 RX ADMIN — SUCRALFATE 1 G: 1 TABLET ORAL at 12:36

## 2024-08-22 RX ADMIN — ENOXAPARIN SODIUM 40 MG: 40 INJECTION SUBCUTANEOUS at 09:03

## 2024-08-22 RX ADMIN — ACETAMINOPHEN 650 MG: 325 TABLET ORAL at 21:00

## 2024-08-22 RX ADMIN — SUCRALFATE 1 G: 1 TABLET ORAL at 16:40

## 2024-08-22 RX ADMIN — APIXABAN 5 MG: 5 TABLET, FILM COATED ORAL at 20:59

## 2024-08-22 RX ADMIN — GABAPENTIN 600 MG: 300 CAPSULE ORAL at 13:52

## 2024-08-22 RX ADMIN — ACETAMINOPHEN 975 MG: 325 TABLET ORAL at 12:43

## 2024-08-22 RX ADMIN — ACETAMINOPHEN 975 MG: 325 TABLET ORAL at 01:53

## 2024-08-22 RX ADMIN — FLUCONAZOLE 200 MG: 2 INJECTION, SOLUTION INTRAVENOUS at 19:02

## 2024-08-22 RX ADMIN — SODIUM CHLORIDE SOLN NEBU 3% 3 ML: 3 NEBU SOLN at 11:42

## 2024-08-22 RX ADMIN — SODIUM CHLORIDE SOLN NEBU 3% 3 ML: 3 NEBU SOLN at 15:33

## 2024-08-22 RX ADMIN — GABAPENTIN 600 MG: 300 CAPSULE ORAL at 20:59

## 2024-08-22 RX ADMIN — SUCRALFATE 1 G: 1 TABLET ORAL at 06:17

## 2024-08-22 RX ADMIN — ACETAMINOPHEN 650 MG: 325 TABLET ORAL at 06:16

## 2024-08-22 RX ADMIN — SENNOSIDES AND DOCUSATE SODIUM 1 TABLET: 8.6; 5 TABLET ORAL at 09:03

## 2024-08-22 RX ADMIN — SENNOSIDES AND DOCUSATE SODIUM 1 TABLET: 8.6; 5 TABLET ORAL at 20:59

## 2024-08-22 RX ADMIN — METOPROLOL TARTRATE 50 MG: 25 TABLET, FILM COATED ORAL at 09:03

## 2024-08-22 ASSESSMENT — ACTIVITIES OF DAILY LIVING (ADL)
ADLS_ACUITY_SCORE: 47
ADLS_ACUITY_SCORE: 49
ADLS_ACUITY_SCORE: 47
ADLS_ACUITY_SCORE: 49
ADLS_ACUITY_SCORE: 49
ADLS_ACUITY_SCORE: 47
IADL_COMMENTS: ASSIST FOR ALL IADLS
ADLS_ACUITY_SCORE: 49
ADLS_ACUITY_SCORE: 47
ADLS_ACUITY_SCORE: 49
ADLS_ACUITY_SCORE: 49
ADLS_ACUITY_SCORE: 47
ADLS_ACUITY_SCORE: 49

## 2024-08-22 NOTE — TREATMENT PLAN
[facility]AT Treatment Plan    Patient Score: 12  Patient Acuity: 3    Clinical Indication for Therapy: atelectasis    Therapy Ordered: Xopenex qid, nebusal qid, flutter valve qid    Assessment Summary: Pt currently on 5 lpm nc, bs diminished on R, fair at times unproductive cough.  Recent Chest CT:little change in the small, loculated hydropneumothorax in the right. Small right apical cap appears slightly decreased from before.   Increased atelectasis in the left base.  Pt refused to use Volara, instead pt using Flutter valve qid. Pt does alexandr better    Cont with current tx, reassess on 8/25/24    Mulu Gipson, RT  8/22/2024

## 2024-08-22 NOTE — PROGRESS NOTES
INFECTIOUS DISEASE FOLLOW UP NOTE      ASSESSMENT:  History of COPD.  History of aortic stenosis status post TAVR.  Status post pacemaker placement  Recent history of COVID-19 infection complicated with secondary bacterial pneumonia in middle 2024.  Sputum cultures on 2024 with E. coli.  Right upper lobe collapse status post thoracentesis on 7/15/2024.  Readmitted with recurrent right-sided pleural effusion status post bronchoscopy on 2024.  Cultures with E. coli and Candida albicans.  Pleural effusion cultures positive with Streptococcus pneumonia.  Now s/p VATS, unable to inflate lung. Bronch deferred due to O2 status.      Yeast from BAL typically colonizer.      PLAN:  Zosyn, fluc for now     Ceftriaxone should be adequate for pneumococcus and E coli. May not need to cover anaerobes as we do when pleural fluid grows microaerophilic strep such as strep anginosus.     Expect about 4 weeks IV antibiotics from chest tube placement. Likely ceftriaxone IV       Ravindra Bacon MD  [facility]  Contact via [employer] or [facility] [phone]  ______________________________________________________________________    SUBJECTIVE / INTERVAL HISTORY: O2 was up to 10L yesterday, better today, at 4L. Pain at chest tube site.     ROS: All other systems negative except as listed above.        OBJECTIVE:  /59 (BP Location: Left arm)   Pulse 82   Temp 97.4  F (36.3  C) (Oral)   Resp 20   Wt 69.6 kg (153 lb 7 oz)   LMP  (LMP Unknown)   SpO2 94%   BMI 25.53 kg/m         Vital Signs  Temp: 97.4  F (36.3  C)  Temp src: Oral  Resp: 20  Pulse: 82  Pulse Rate Source: Monitor  BP: 102/59  BP Location: Left arm    Temp (24hrs), Av.3  F (36.8  C), Min:97.4  F (36.3  C), Max:99.1  F (37.3  C)      GEN: No acute distress.  O2 NC.  RESPIRATORY:  Chest tube.  CARDIOVASCULAR:  regular  ABDOMEN:  deferred  EXTREMITIES: No edema.  SKIN/HAIR/NAILS:  No rashes  IV:  PICC        Antibiotics:  pip/tazo, fluc  On either zosyn or ceftri since 8/10    Pertinent labs:    Recent Labs   Lab 08/22/24  0500 08/21/24  0529 08/20/24  0406   WBC 10.7 12.4* 11.9*   HGB 9.7* 10.1* 10.0*   HCT 34.1* 36.1 35.8    273 271        Recent Labs   Lab 08/22/24  0500 08/21/24  0529 08/20/24  0406    144 143   CO2 30* 30* 32*   BUN 22.4 16.0 15.9        Lab Results   Component Value Date    CRP 0.5 01/12/2023      [unfilled]    Lab Results   Component Value Date    ALT 31 08/18/2024    AST 20 08/18/2024    ALKPHOS 105 08/18/2024         MICROBIOLOGY DATA:  Susceptibility data from last 90 days.  Collected Specimen Info Organism Ampicillin Ampicillin/Sulbactam Azithromycin Cefepime Ceftazidime Ceftriaxone Ceftriaxone (meningitis) Ceftriaxone (non-meningitis) Ciprofloxacin Clindamycin Gentamicin Levofloxacin Meropenem   08/11/24 Bronchial Alveolar Lavage from Lung, Upper Lobe, Right Escherichia coli                  Normal anna marie                08/11/24 Bronchial Alveolar Lavage from Lung, Right Yeast                08/11/24 Bronchial Alveolar Lavage from Lung, Lower Lobe, Right Escherichia coli                  Normal anna marie                08/11/24 Bronchial Alveolar Lavage from Lung, Right Candida albicans                08/10/24 Sputum from Expectorate Streptococcus pneumoniae    S     S  S   S   S      Escherichia coli  S  S   S  S  S    S   S  S  S   08/10/24 Pleural fluid from Pleural Cavity, Right Streptococcus pneumoniae    S     S  S   S   S    07/13/24 Sputum from Expectorate Escherichia coli  S  S   S  S  S    S   S  S  S     Normal anna marie                  Collected Specimen Info Organism Penicillin (meningitis) Penicillin (non-meningitis) Penicillin(oral) Piperacillin/Tazobactam Tobramycin Trimethoprim/Sulfamethoxazole  Vancomycin   08/11/24 Bronchial Alveolar Lavage from Lung, Upper Lobe, Right Escherichia coli            Normal anna marie          08/11/24 Bronchial Alveolar Lavage from  Lung, Right Yeast          08/11/24 Bronchial Alveolar Lavage from Lung, Lower Lobe, Right Escherichia coli            Normal anna marie          08/11/24 Bronchial Alveolar Lavage from Lung, Right Candida albicans          08/10/24 Sputum from Expectorate Streptococcus pneumoniae  S  S  S     S     Escherichia coli     S  S  S    08/10/24 Pleural fluid from Pleural Cavity, Right Streptococcus pneumoniae  S  S  S     S   07/13/24 Sputum from Expectorate Escherichia coli     S  S  S      Normal anna marie              RADIOLOGY:  XR Chest Port 1 View    Result Date: 8/22/2024  EXAM: XR CHEST PORT 1 VIEW LOCATION: [facility] DATE: 8/22/2024 INDICATION: Recheck chest tube and pleural effusions. Postop thoracotomy and empyema drainage. COMPARISON: CT chest and chest x-ray 8/20/2024 and older studies     IMPRESSION: Postthoracotomy changes on the right with large bore chest tube. Right upper extremity PICC line catheter in good position. Dual-lead left subclavian venous pacer. TAVR. Shallower inspiration., Likely little change in the small, loculated hydropneumothorax in the right. Small right apical cap appears slightly decreased from before. Increased atelectasis in the left base.    CT Chest w/o Contrast    Result Date: 8/20/2024  EXAM: CT CHEST W/O CONTRAST LOCATION: [facility] DATE: 8/20/2024 INDICATION: ?LLL mass or occlusion, unable to re expand lung. COMPARISON: 8/16/2024 TECHNIQUE: CT chest without IV contrast. Multiplanar reformats were obtained. Dose reduction techniques were used. Lack of intravenous contrast significantly limits exam. CONTRAST: None. FINDINGS: LUNGS AND PLEURA: Interval placement of large bore right-sided chest tube with decrease in right pleural effusion. There is a moderate loculated appearing residual. Small anterior pneumothorax now noted. There is improved aeration of the right upper lobe, with persistent right middle lobe and lower lobe collapse  and mediastinal shift. Centrilobular emphysematous change with an upper lobe predominance. Increasing, now moderate left pleural effusion with compressive basilar atelectasis. Moderate retained secretions in the central airways. Significant retained secretions in right lower lobe bronchi. MEDIASTINUM/AXILLAE: Right-sided PICC line terminates in the distal SVC. Cardiac pacemaker. TAVR. CORONARY ARTERY CALCIFICATION: Severe. UPPER ABDOMEN: Right adrenal nodular hyperplasia. Significant enlargement of the left adrenal gland, potentially containing multiple low-attenuation nodules measuring up to 2 cm in size. Adenomatous hyperplasia favored. MUSCULOSKELETAL: No acute bony abnormalities.     IMPRESSION: 1.  Decreased right pleural effusion status post chest tube placement, with moderate, likely loculated/complicated residual, as well as small associated right pneumothorax now evident. 2.  Reexpansion of the right upper lobe with persistent collapse of right middle and lower lobe segments. Significant retained secretions, greatest in the right lower lobe. 3.  Increasing left pleural effusion. 4.  Additional findings as above.     XR Chest Port 1 View    Result Date: 8/20/2024  EXAM: XR CHEST PORT 1 VIEW LOCATION: [facility] DATE: 8/20/2024 INDICATION: Recheck chest tube and pleural effusions. Postop thoracotomy and empyema drainage. COMPARISON: 8/19/2024 and older studies, CT chest 8/16/2024 and older studies     IMPRESSION: Postthoracotomy changes with large bore right-sided chest tube. Removal of the right basilar pigtail chest tube. Dual-lead left subclavian venous pacer, TAVR and right upper extremity PICC line catheter remain in good position. There has been only partial reexpansion of the right upper lobe and apparently non of the right lower lobe. Likely small pneumothorax outlining collapsed lung in the right lateral costophrenic angle. Small right apical pleural cap persists. Volume loss  with marked shift of the mediastinum into the right chest has only slightly decreased. Minimal atelectasis in the left base behind the heart with trace effusion again noted. No signs of pneumonia or failure.    XR Chest Port 1 View    Result Date: 8/19/2024  EXAM: XR CHEST PORT 1 VIEW LOCATION: [facility] DATE: 8/19/2024 INDICATION: Recheck chest tube and pleural effusions COMPARISON: August 18, 2024     IMPRESSION: Stable pacemaker, TAVR, right chest tube, right PICC. Minimal change in near complete opacification of the right hemithorax with volume loss.    XR Chest Port 1 View    Result Date: 8/18/2024  EXAM: XR CHEST PORT 1 VIEW LOCATION: [facility] DATE: 8/18/2024 INDICATION: Recheck chest tube and pleural effusions COMPARISON: 8/17/2024     IMPRESSION: No change since yesterday. Small caliber chest tube projected at the right lung base unchanged. Complete opacification of the right hemithorax with volume loss unchanged. Right PICC line tip in low SVC. Transvenous pacemaker. Hyperinflation left lung.    POC US Chest B-Scan    Limited Bedside Lung Ultrasound, performed and interpreted by me. Indication: empyema and dyspnea With the patient positioned supine, right posterior and lateral lung fields were examined for evidence of thoracic free fluid, pulmonary consolidation, and pulmonary edema. Findings: moderate right pleural effusion noted, mostly free flowing. Some debris noted (plankton sign) Chest tube visualized in the pleural effusion. Right lung atelectasis noted. No obvious loculations although scanning was limited due to presence of dressing. IMPRESSION: moderate to large right pleural effusion with chest tube in place. No obvious loculations noted on this limited bedside pleural/lung POCUS.      XR Chest Port 1 View    Result Date: 8/17/2024  EXAM: XR CHEST PORT 1 VIEW LOCATION: [facility] DATE: 8/17/2024 INDICATION: Recheck  chest tube and pleural effusions COMPARISON: CT chest without contrast 08/16/2024, chest radiograph 08/15/2024     IMPRESSION: Stable position of the right basilar pigtail chest tube. Stable complete opacification of the right hemithorax with rib crowding compatible with large pleural effusion and associated collapse of the right lung. Right upper extremity PICC line  with tip overlying the mid aspect of the SVC. Cardiomediastinal silhouette is partially obscured by the above process however appears grossly stable. Aortic valve replacement. Stable position of the left chest pacemaker. Trace left pleural effusion. Streaky opacities at the left lung base favoring atelectasis. No pneumothorax. Unchanged osseous structures.    CT Chest w/o Contrast    Result Date: 8/16/2024  EXAM: CT CHEST W/O CONTRAST LOCATION: [facility] DATE: 8/16/2024 INDICATION: Recheck chest tube placement, clogged, etc. and recheck effusion given patient increase O2 and tachycardia COMPARISON: 8/13/2024 TECHNIQUE: CT chest without IV contrast. Multiplanar reformats were obtained. Dose reduction techniques were used. CONTRAST: None. FINDINGS: LUNGS AND PLEURA: Large right pleural effusion increased from previous. Complete collapse of the right lung. The right mainstem, upper and lower lobe bronchi are essentially completely occluded increased from previous. Single right chest tube. Small left pleural effusion with passive atelectasis left lung base slightly increased from previous. MEDIASTINUM/AXILLAE: Transvenous pacemaker. Aortic valve prosthesis. Right PICC line tip in SVC. CORONARY ARTERY CALCIFICATION: Severe. UPPER ABDOMEN: No significant finding. MUSCULOSKELETAL: Unremarkable.     IMPRESSION: 1.  Large right pleural effusion increased from previous despite the presence of the chest tube. 2.  Complete collapse of the right lung with complete opacification of the right upper lobe, right lower lobe and right mainstem  bronchus increased from previous. 3.  Small left pleural effusion increased from previous.     XR Chest 1 View    Result Date: 8/15/2024  EXAM: XR CHEST 1 VIEW LOCATION: [facility] DATE: 8/15/2024 INDICATION: Treated for empyema, s p chest tube , follow up pleural effusion COMPARISON: CT chest 8/13/2024, chest radiograph 8/11/2024     IMPRESSION: Unchanged position of the pigtail chest tube in the right lung base with similar complete opacification of the right hemithorax. Trace left effusion with dependent atelectasis. Background emphysema. Cardiac silhouette and mediastinal contours  are mostly obscured. Aortic valve replacement. Left chest cardiac generator and leads are unchanged. Right upper extremity PICC tip terminates in the mid SVC.    XR Video Swallow with SLP or OT    Result Date: 8/14/2024  EXAM: XR VIDEO SWALLOW WITH SLP OR OT LOCATION: [facility] DATE: 8/14/2024 INDICATION: Difficulty swallowing. COMPARISON: None. TECHNIQUE: Routine swallow study with speech pathology using multiple barium thicknesses. RADIATION DOSE: Dose Area Product 23.86 microGy-m2 FINDINGS: Swallow study with Speech Pathology using multiple barium thicknesses. Penetration with thin liquids, which slightly improved with chin tuck maneuver and decreased swallow volumes. No definite aspiration visualized with trialed consistencies.     IMPRESSION: Penetration with thin liquids, however no aspiration visualized. Please see speech pathology report for further details and recommendations.    CT Chest w/o Contrast    Result Date: 8/14/2024  EXAM: CT CHEST W/O CONTRAST LOCATION: [facility] DATE: 8/13/2024 INDICATION: empyema s p chest tube, ?evacuated COMPARISON: 8/10/2024 TECHNIQUE: CT chest without IV contrast. Multiplanar reformats were obtained. Dose reduction techniques were used. CONTRAST: None. FINDINGS: LUNGS AND PLEURA: Right basilar pigtail pleural  drain in place. Large right pleural effusion with complete atelectasis of the right lung. A few foci of gas in the pleural space. Small left pleural effusion. Emphysema. Mild frothy secretions in trachea. Debris occludes the central right lung airways. MEDIASTINUM/AXILLAE: Stable left subclavian approach pacemaker and TAVR. Right PICC with tip near the cavoatrial junction. Rightward mediastinal shift. No lymphadenopathy. CORONARY ARTERY CALCIFICATION: Moderate. UPPER ABDOMEN: Unchanged thickening of the left adrenal gland. MUSCULOSKELETAL: Unremarkable     IMPRESSION: 1.  Large right pleural effusion with right lung collapse.     US Abdomen Limited    Result Date: 8/12/2024  EXAM: US ABDOMEN LIMITED LOCATION: [facility] DATE: 8/12/2024 INDICATION: Abnormal LFTs. COMPARISON: CT 8/10/2024 TECHNIQUE: Limited abdominal ultrasound. FINDINGS: Study is limited by a chest tube and a compression dressing on the right side. Within limitations, findings are as follows. GALLBLADDER: Limited evaluation is within normal limits. No gallstones, wall thickening, or pericholecystic fluid. Negative sonographic Ospina's sign. BILE DUCTS: No biliary dilatation. The common duct measures 7 mm. LIVER: Normal parenchyma with smooth contour. No focal mass. RIGHT KIDNEY: Not seen due to bowel gas. PANCREAS: The pancreas is largely obscured by overlying gas. No ascites.     IMPRESSION: 1.  Normal limited abdominal ultrasound within limitations detailed above.     Cardiac Device Check - Remote    Result Date: 8/12/2024  Encounter Type: alert remote pacemaker transmission for AT/AF >/=6hrs for 2 days. Device: [employer]onic Lucy. Pacing % /Programmed: AP 43%,  <1% at AAIR<=>DDDR 60/130 ppm. Lead(s): stable. Battery longevity: 14yrs, 3mo estimated. Presenting: Atrial flutter with ventricular sensing  bpm. Atrial high rates: since 4/29/24; 24 mode switch episodes, AT/AF appears to be in progress since 7/21/24 2:45PM,  burden <1%, v-rates >/=120bpm ~95%. 19 fast A&V episodes, available EGMs appear to be RVR during AF. Anticoagulant: Eliquis. Ventricular High rates: since 4/29/24; None detected. Comments: Normal device function. Plan: Routed to device RN for review. MICHELLE Padgett, Device Specialist ADD: Transmission reviewed, see EPIC for details. Zofia Hodges RN  Device follow up for the entirety of having the Pacemaker, based on best practices determined by [employer] and in Compliance with Medicare guidelines. Continue remote device monitoring per patient plan. I have reviewed and interpreted the device interrogation, settings, programming, and encounter summary. The device is functioning within normal device parameters. I agree with the current findings, assessment and plan.    XR Chest 1 View    Result Date: 8/11/2024  EXAM: XR CHEST 1 VIEW LOCATION: [facility] DATE: 08/11/2024 INDICATION: New chest tube placement. COMPARISON: 08/10/2024 CXR and CT chest.     IMPRESSION: Interval placement right basilar pleural drainage catheter. Previously seen large right pleural effusion has been nearly completely evacuated and the mid and lower right lung have partially reexpanded. No pneumothorax. Left lung clear. Mild cardiac enlargement. Transcatheter aortic valve replacement. Pacer anterior left chest wall with lead tips in the RA and RV. Right PICC tip RA/SVC junction.     XR Chest Port 1 View    Result Date: 8/10/2024  EXAM: XR CHEST PORT 1 VIEW LOCATION: [facility] DATE: 8/10/2024 INDICATION: RN placed PICC   verify tip placement COMPARISON: 8/10/2024     IMPRESSION: New right PICC with tip near the cavoatrial junction. Otherwise, no significant interval change.    XR Chest Port 1 View    Result Date: 8/10/2024  EXAM: XR CHEST PORTABLE 1 VIEW LOCATION: [facility] DATE: 08/10/2024 INDICATION: Status post right pigtail chest tube. COMPARISON: 08/10/2024  at 1010 hours.     IMPRESSION: No change in dual-lead cardiac pacer or TAVR. Right-sided chest tube has been placed since comparison study with decrease in the previously seen large right pleural effusion. A moderate to large pleural effusion remains. There is likely overlying compressive atelectasis of the inferior portion of the right lung with concurrent infectious process not excluded. There is some indistinctness of the pulmonary interstitium involving the left lower lobe which is new from prior study and may reflect airway inflammation or edema.     CT Chest w Contrast    Result Date: 8/10/2024  EXAM: CT CHEST W CONTRAST LOCATION: [facility] DATE: 8/10/2024 INDICATION: SOB, large right-sided pleural effusion status post right pigtail COMPARISON: None. TECHNIQUE: CT chest with IV contrast. Multiplanar reformats were obtained. Dose reduction techniques were used. CONTRAST: 90 mL [id number] FINDINGS: LUNGS AND PLEURA: Large right effusion. Pigtail catheter is at the anterior right apex without significant fluid surrounding it. Extensive compressive atelectasis throughout the right lung. Left lung clear. MEDIASTINUM/AXILLAE: No lymphadenopathy. No thoracic aneurysm. CORONARY ARTERY CALCIFICATION: Moderate. UPPER ABDOMEN: No acute findings. MUSCULOSKELETAL: No frankly destructive bony lesions.     IMPRESSION: 1.  Large right effusion and extensive compressive atelectasis versus less likely infiltrate in the right lung. 2.  The pigtail catheter tip is at the anterior apex, most of the fluid is at the base.        XR Chest Port 1 View    Result Date: 8/10/2024  EXAM: XR CHEST PORT 1 VIEW LOCATION: [facility] DATE: 8/10/2024 INDICATION: sob, hypoxic, history of prior ptx, eval for ptx, pna or edema COMPARISON: 7/17/2020     IMPRESSION: Large right pleural effusion. No pneumothorax. Left subclavian approach pacing device. Prosthetic aortic valve. Cardiac silhouette  within normal limits. No acute osseous abnormality.    XR Chest Port 1 View    Result Date: 7/17/2024  EXAM: XR CHEST PORT 1 VIEW LOCATION: [facility] DATE: 7/17/2024 INDICATION: Recheck pneumothorax COMPARISON: Portable chest radiograph 07/16/2024     IMPRESSION: Stable position of left chest pacemaker. Prior aortic valve replacement. Slightly decreased size of right hilar pneumothorax measuring 8 mm currently, previously 15 mm. Small right pleural effusion with adjacent compressive atelectasis. Streaky opacities at the left lung base, favoring atelectasis. Stable mildly enlarged cardiomediastinal silhouette. Mild central pulmonary vascular congestion. Unchanged osseous structures.    XR Chest Port 1 View    Result Date: 7/16/2024  EXAM: XR CHEST PORT 1 VIEW LOCATION: [facility] DATE: 7/16/2024 INDICATION: Follow up PTX COMPARISON: July 15, 2024 2107 hours and other recent prior exams.     IMPRESSION: Cardiac pacing device is again observed along with mediastinal shift toward the right. There is a subpulmonic right pneumothorax which appears unchanged or slightly smaller than on x-ray yesterday. The left lung is clear and free of pneumothorax.    XR Chest Port 1 View    Result Date: 7/15/2024  EXAM: XR CHEST PORT 1 VIEW LOCATION: [facility] DATE: 7/15/2024 INDICATION: follow up pneumothorax COMPARISON: 7/15/2024 at 5:34 PM and 7/14/2024.     IMPRESSION: Small to moderate size right pneumothorax not significantly changed from the study at 5:34 PM today. Stable focal opacity at the right base which may be a small amount of residual pleural effusion, atelectasis and/or infiltrate. Mild shift of  mediastinal structures to the right as before. Stable mild cardiomegaly. Normal pulmonary vascularity. Left subclavian pacemaker unchanged.    XR Chest Port 1 View    Result Date: 7/15/2024  EXAM: XR CHEST PORT 1 VIEW LOCATION: [facility] DATE: 7/15/2024 INDICATION: Right upper lobe collapse. COMPARISON: 7/14/2024.     IMPRESSION: New, small to moderate-sized right pneumothorax, greatest at the peripheral right lung base. Previously large right pleural effusion is no longer present. Small amount of residual pleural fluid versus airspace disease is present at the right lung base. Continued imaging follow-up to resolution is recommended. No left pleural effusion or pneumothorax. Upper limits of normal heart size. Left chest wall cardiac implantable electronic device. Critical Result: Pneumothorax (new, increasing or tension) Finding was identified on 7/15/2024 5:55 PM CDT. Dr. Mendoza was contacted by me on 7/15/2024 6:25 PM CDT and verbalized understanding of the critical result.     US Thoracentesis    Result Date: 7/15/2024  EXAM: 1. RIGHT THORACENTESIS 2. ULTRASOUND GUIDANCE LOCATION: [facility] DATE: 7/15/2024 INDICATION: Pleural effusion. PROCEDURE: Informed consent obtained. Time out performed. The chest was prepped and draped in sterile fashion. 10 mL of 1 % lidocaine was infused into the local soft tissues. Under direct ultrasound guidance, a 5 Polish catheter system was placed into the pleural effusion. 0.9 liters of anders-colored fluid were removed and sent to lab, if requested. Patient tolerated procedure well. Ultrasound imaging was obtained and placed in the patient's permanent medical record.     IMPRESSION: Status post right ultrasound-guided thoracentesis. Reference CPT Code: 17661    XR Chest Port 1 View    Addendum Date: 7/14/2024    EXAM: XR CHEST PORT 1 VIEW LOCATION: [facility] DATE: 7/14/2024 INDICATION: Evaluate partial collapse COMPARISON: 7/13/2024 IMPRESSION: Increased atelectasis of the RIGHT lung with rightward shift of the mediastinum. Small right pleural effusion. Clear left lung. Left subclavian approach pacing device. Cardiac silhouette within normal  limits. Prosthetic aortic valve.     Result Date: 7/14/2024  EXAM: XR CHEST PORT 1 VIEW LOCATION: [facility] DATE: 7/14/2024 INDICATION: Evaluate partial collapse COMPARISON: 7/13/2024     IMPRESSION: Increased atelectasis of the left lung with rightward shift of the mediastinum. Small right pleural effusion. Clear left lung. Left subclavian approach pacing device. Cardiac silhouette within normal limits. Prosthetic aortic valve.    XR Chest Port 1 View    Result Date: 7/13/2024  EXAM: XR CHEST PORT 1 VIEW LOCATION: [facility] DATE: 7/13/2024 INDICATION: eval for interval change in lung collapse COMPARISON: CT chest 7/12/2024     IMPRESSION: Similar moderate right pleural effusion with adjacent opacity favoring atelectasis. Given differences in technique and single AP view, right upper lung aeration appears improved. Left lung remains clear. Cardiomediastinal silhouette, left chest wall cardiac device and TAVR.    CT Chest Pulmonary Embolism w Contrast    Result Date: 7/12/2024  EXAM: CT CHEST PULMONARY EMBOLISM W CONTRAST LOCATION: [facility] DATE: 7/12/2024 INDICATION: covid positive, hypoxia, eval for pneumonia, PE COMPARISON: 2/6/2024 TECHNIQUE: CT chest pulmonary angiogram during arterial phase injection of IV contrast. Multiplanar reformats and MIP reconstructions were performed. Dose reduction techniques were used. CONTRAST: 75 ML ISOVUE 370 FINDINGS: ANGIOGRAM CHEST: No pulmonary artery embolism. LUNGS AND PLEURA: Since February 2024, development of right upper lobe collapse with right upper lobe airway becoming completely opacified shortly after its origin. No significant change of middle and right lower lobe volume loss with patent middle and right lower lobe airways. Moderate right pleural effusion. No pneumothorax. MEDIASTINUM/AXILLAE: Compromised evaluation of right perihilar adenopathy from adjacent volume loss. No  significant mediastinal or left perihilar adenopathy. Normal heart size. No pericardial effusion. Aortic valvular prosthetic. Dual-chamber pacer. Patulous esophagus. CORONARY ARTERY CALCIFICATION: Moderate. UPPER ABDOMEN: No actionable findings. MUSCULOSKELETAL: Bony demineralization and degenerative changes.     IMPRESSION: 1.  No pulmonary embolism. 2.  Interval complete right upper lobe collapse with opacification of the proximal right upper lobe airway of indeterminate etiology. Recommend pulmonary follow-up and direct visualization or repeat chest CT to exclude an obstructing lesion. Retained airway debris. 3.  No significant change of moderate right pleural effusion, of indeterminate etiology. 4.  Moderately advanced emphysema.

## 2024-08-22 NOTE — PROGRESS NOTES
General Surgery Progress Note:    Hospital Day # 4    ASSESSMENT:  1. Empyema (H)    2. Atelectasis        Mary Kay Tejada is a 74 year old female with complicated PMH including HFpEF, COPD on 3L at home, A-fib on Eliquis, here with empyema most recently s/p VATS and 32 Fr chest tube 8/19 without full re-expansion of the right lung. Pulm planned repeat bronchoscopy (first done 8/11) 8/21 to further evaluate lung however patient with increased O2 at 10L in [place] and deferred procedure. Patient is no longer wishing for this procedure if there is higher risk of remaining intubated afterward. CXR with some improvement on the right upper lung. Will continue chest tube but place on water seal today, likely removal tomorrow pending CXR    PLAN:  -Continue chest tube placed to water seal, keep container upright and should have downward trend in tubing from chest to container  -Repeat CXR tomorrow morning  -Activity as tolerated, up in chair for meals  -ABX per ID  -Medical management per primary team    SUBJECTIVE:   Mary Kay Tejada was seen on rounds.  Patient states she is doing okay, she did not really feel increased shortness of breath or anything yesterday when she went to [place] and required more oxygen.  However it did make her nervous.  She states after discussion with the pulmonologist she does not want to go with a bronchoscopy ever.  She is truly nervous that she will remain intubated after the procedure and she does not want that.  She states they gave her another option with aggressive pulmonary hygiene and different antibiotics and she would like to pursue this route.  She is still very sore around this chest tube site but is not worse than previous days and actually was able to get up to the chair today, did feel dizzy and weak at first with standing but has resolved while sitting.  Denies new chest pains, shortness of breath, new cough.  No Fever or chills.      Patient Vitals for the past 24 hrs:   BP Temp  Temp src Pulse Resp SpO2   08/22/24 0821 109/57 98.4  F (36.9  C) Axillary 81 20 95 %   08/22/24 0725 -- -- -- 82 20 91 %   08/22/24 0204 -- -- -- -- -- 91 %   08/21/24 2315 111/65 99.1  F (37.3  C) Axillary 78 20 99 %   08/21/24 2039 102/59 -- -- -- -- --   08/21/24 1900 -- -- -- -- -- 90 %   08/21/24 1555 122/69 97.4  F (36.3  C) Oral 82 20 98 %   08/21/24 1303 117/67 97.5  F (36.4  C) Oral 82 20 --   08/21/24 1115 -- 97.5  F (36.4  C) Oral 82 (!) 84 93 %         PHYSICAL EXAM:  General: patient seen sitting up in chair in no acute distress    Resp: no increased work of breathing, breathing comfortably on 4 L of oxygen through nasal cannula.  Right posterior and anterior lung fields with somewhat diminished breath sounds on the upper fields, faint to minimal breath sounds over the lower fields, somewhat obstructed from auscultation due to bandaging.  Chest tube bandage appears clean, dry, intact and without leak.  Chest tube assessed at waterseal and without titling or bubbling today, tubing assessed and no leak found.  Draining serous fluid into the container, per records about 300 yesterday    Abdomen: Generally soft and nontender    Extremities: No edema or cyanosis visualized on exam, no obvious deformities    08/21 0700 - 08/22 0659  In: 2185 [P.O.:1140; I.V.:1045]  Out: 635 [Urine:450]    Admission on 08/17/2024   Component Date Value    pH Venous 08/17/2024 7.41     pCO2 Venous 08/17/2024 56 (H)     pO2 Venous 08/17/2024 37     Bicarbonate Venous 08/17/2024 36 (H)     Base Excess/Deficit Veno* 08/17/2024 10.9 (H)     FIO2 08/17/2024 32     Oxyhemoglobin Venous 08/17/2024 63 (L)     O2 Sat, Venous 08/17/2024 64.4 (L)     GLUCOSE BY METER POCT 08/17/2024 228 (H)     GLUCOSE BY METER POCT 08/17/2024 239 (H)     Sodium 08/18/2024 140     Potassium 08/18/2024 4.0     Chloride 08/18/2024 103     Carbon Dioxide (CO2) 08/18/2024 34 (H)     Anion Gap 08/18/2024 3 (L)     Urea Nitrogen 08/18/2024 14.4     Creatinine  08/18/2024 0.50 (L)     GFR Estimate 08/18/2024 >90     Calcium 08/18/2024 8.5 (L)     Glucose 08/18/2024 143 (H)     Magnesium 08/18/2024      Hemoglobin A1C 08/18/2024      GLUCOSE BY METER POCT 08/18/2024 134 (H)     Magnesium 08/18/2024 2.1     Protein Total 08/18/2024 5.2 (L)     Albumin 08/18/2024 2.2 (L)     Bilirubin Total 08/18/2024 0.4     Alkaline Phosphatase 08/18/2024 105     AST 08/18/2024 20     ALT 08/18/2024 31     Bilirubin Direct 08/18/2024 <0.20     GLUCOSE BY METER POCT 08/18/2024 189 (H)     GLUCOSE BY METER POCT 08/18/2024 324 (H)     GLUCOSE BY METER POCT 08/18/2024 296 (H)     Sodium 08/19/2024 144     Potassium 08/19/2024 4.2     Chloride 08/19/2024 107     Carbon Dioxide (CO2) 08/19/2024 31 (H)     Anion Gap 08/19/2024 6 (L)     Urea Nitrogen 08/19/2024 17.6     Creatinine 08/19/2024 0.55     GFR Estimate 08/19/2024 >90     Calcium 08/19/2024 8.3 (L)     Glucose 08/19/2024 127 (H)     Magnesium 08/19/2024 2.2     WBC Count 08/19/2024 10.4     RBC Count 08/19/2024 4.26     Hemoglobin 08/19/2024 10.2 (L)     Hematocrit 08/19/2024 36.8     MCV 08/19/2024 86     MCH 08/19/2024 23.9 (L)     MCHC 08/19/2024 27.7 (L)     RDW 08/19/2024 17.5 (H)     Platelet Count 08/19/2024 274     GLUCOSE BY METER POCT 08/19/2024 126 (H)     GLUCOSE BY METER POCT 08/19/2024 138 (H)     ABO/RH(D) 08/19/2024 O POS     Antibody Screen 08/19/2024 Negative     SPECIMEN EXPIRATION DATE 08/19/2024 [id number]     Final Diagnosis 08/19/2024                      Value:This result contains rich text formatting which cannot be displayed here.    Gross Description 08/19/2024                      Value:This result contains rich text formatting which cannot be displayed here.    Microscopic Description 08/19/2024                      Value:This result contains rich text formatting which cannot be displayed here.    Performing Labs 08/19/2024                      Value:This result contains rich text formatting which cannot  be displayed here.    GLUCOSE BY METER POCT 08/19/2024 159 (H)     GLUCOSE BY METER POCT 08/19/2024 199 (H)     GLUCOSE BY METER POCT 08/19/2024 150 (H)     Sodium 08/20/2024 143     Potassium 08/20/2024 4.5     Chloride 08/20/2024 106     Carbon Dioxide (CO2) 08/20/2024 32 (H)     Anion Gap 08/20/2024 5 (L)     Urea Nitrogen 08/20/2024 15.9     Creatinine 08/20/2024 0.51     GFR Estimate 08/20/2024 >90     Calcium 08/20/2024 8.1 (L)     Glucose 08/20/2024 138 (H)     Magnesium 08/20/2024 1.9     WBC Count 08/20/2024 11.9 (H)     RBC Count 08/20/2024 4.12     Hemoglobin 08/20/2024 10.0 (L)     Hematocrit 08/20/2024 35.8     MCV 08/20/2024 87     MCH 08/20/2024 24.3 (L)     MCHC 08/20/2024 27.9 (L)     RDW 08/20/2024 17.7 (H)     Platelet Count 08/20/2024 271     GLUCOSE BY METER POCT 08/20/2024 139 (H)     GLUCOSE BY METER POCT 08/20/2024 135 (H)     GLUCOSE BY METER POCT 08/20/2024 128 (H)     GLUCOSE BY METER POCT 08/20/2024 112 (H)     Sodium 08/21/2024 144     Potassium 08/21/2024 4.0     Chloride 08/21/2024 106     Carbon Dioxide (CO2) 08/21/2024 30 (H)     Anion Gap 08/21/2024 8     Urea Nitrogen 08/21/2024 16.0     Creatinine 08/21/2024 0.49 (L)     GFR Estimate 08/21/2024 >90     Calcium 08/21/2024 8.4 (L)     Glucose 08/21/2024 78     WBC Count 08/21/2024 12.4 (H)     RBC Count 08/21/2024 4.17     Hemoglobin 08/21/2024 10.1 (L)     Hematocrit 08/21/2024 36.1     MCV 08/21/2024 87     MCH 08/21/2024 24.2 (L)     MCHC 08/21/2024 28.0 (L)     RDW 08/21/2024 18.1 (H)     Platelet Count 08/21/2024 273     Ventricular Rate 08/21/2024 83     Atrial Rate 08/21/2024 83     MI Interval 08/21/2024 210     QRS Duration 08/21/2024 92     QT 08/21/2024 336     QTc 08/21/2024 394     P Axis 08/21/2024 85     R AXIS 08/21/2024 -57     T Axis 08/21/2024 103     Interpretation ECG 08/21/2024                      Value:Atrial flutter  Left axis deviation  Pulmonary disease pattern  Nonspecific ST and T wave  abnormality  Abnormal ECG  When compared with ECG of 12-Aug-2024 10:57,  Vent. rate has decreased by  56 bpm  ST now depressed in Inferior leads  Confirmed by SHOSHANA GARCIA MD LOC:JN ([id number]) on 8/21/2024 8:17:11 AM      Magnesium 08/21/2024 2.1     GLUCOSE BY METER POCT 08/21/2024 71     GLUCOSE BY METER POCT 08/21/2024 84     GLUCOSE BY METER POCT 08/21/2024 102 (H)     GLUCOSE BY METER POCT 08/21/2024 179 (H)     Sodium 08/22/2024 142     Potassium 08/22/2024 4.0     Chloride 08/22/2024 106     Carbon Dioxide (CO2) 08/22/2024 30 (H)     Anion Gap 08/22/2024 6 (L)     Urea Nitrogen 08/22/2024 22.4     Creatinine 08/22/2024 0.56     GFR Estimate 08/22/2024 >90     Calcium 08/22/2024 8.4 (L)     Glucose 08/22/2024 114 (H)     Magnesium 08/22/2024 2.0     WBC Count 08/22/2024 10.7     RBC Count 08/22/2024 3.97     Hemoglobin 08/22/2024 9.7 (L)     Hematocrit 08/22/2024 34.1 (L)     MCV 08/22/2024 86     MCH 08/22/2024 24.4 (L)     MCHC 08/22/2024 28.4 (L)     RDW 08/22/2024 17.9 (H)     Platelet Count 08/22/2024 272     % Neutrophils 08/22/2024 83     % Lymphocytes 08/22/2024 8     % Monocytes 08/22/2024 7     % Eosinophils 08/22/2024 0     % Basophils 08/22/2024 0     % Immature Granulocytes 08/22/2024 1     NRBCs per 100 WBC 08/22/2024 0     Absolute Neutrophils 08/22/2024 9.0 (H)     Absolute Lymphocytes 08/22/2024 0.9     Absolute Monocytes 08/22/2024 0.8     Absolute Eosinophils 08/22/2024 0.0     Absolute Basophils 08/22/2024 0.0     Absolute Immature Granul* 08/22/2024 0.1     Absolute NRBCs 08/22/2024 0.0     GLUCOSE BY METER POCT 08/22/2024 104 (H)         APRIL Salmeron [facility]  [address] [facility] [address] [phone]

## 2024-08-22 NOTE — PLAN OF CARE
"Goal Outcome Evaluation:      Plan of Care Reviewed With: patient    Overall Patient Progress: improvingOverall Patient Progress: improving    Outcome Evaluation: Up in chair for most of the shift, States \"it feels good to get out of bed\".  Heavy assist of 2 for bed to chair transfers.  CT to water seal, no increase of dyspnea      "

## 2024-08-22 NOTE — PROGRESS NOTES
08/22/24 1108   Appointment Info   Signing Clinician's Name / Credentials (PT) Татьяна Moody, PT   Living Environment   People in Home child(david), adult  (son and DIL(PCA))   Current Living Arrangements house   Self-Care   Equipment Currently Used at Home cane, straight;walker, standard   Activity/Exercise/Self-Care Comment IND for BADLs but frequently has assist from DIL when needed   General Information   Onset of Illness/Injury or Date of Surgery 08/17/24   Referring Physician Dr. Lauren Willson   Patient/Family Therapy Goals Statement (PT) none stated   Pertinent History of Current Problem (include personal factors and/or comorbidities that impact the POC) Mary Kay Tejada is a 74 year old female with complicated PMH including HFpEF, COPD on 3L at home, A-fib on Eliquis, here with empyema most recently s/p VATS and 32 Fr chest tube 8/19 without full re-expansion of the right lung. Pulm planned repeat bronchoscopy (first done 8/11) 8/21 to further evaluate lung however patient with increased O2 at 10L in [place] and deferred procedure. Patient is no longer wishing for this procedure if there is higher risk of remaining intubated afterward. CXR with some improvement on the right upper lung. Will continue chest tube but place on water seal today, likely removal tomorrow pending CXR   Existing Precautions/Restrictions other (see comments)  (chest tube)   General Observations pt up in recliner, agreeable to PT   Cognition   Affect/Mental Status (Cognition) WFL   Pain Assessment   Patient Currently in Pain Yes, see Vital Sign flowsheet  (chest tube site)   Range of Motion (ROM)   ROM Comment pt able to move BLE   Strength (Manual Muscle Testing)   Strength Comments deficits noted with mobility   Bed Mobility   Comment, (Bed Mobility) pt up in recliner at start/end of session   Transfers   Transfer Safety Concerns Noted decreased weight-shifting ability;losing balance backward   Impairments Contributing to Impaired  Transfers pain;decreased strength;impaired balance   Comment, (Transfers) sit>stand maxAX2 arm in arm unable to complete full stand   Gait/Stairs (Locomotion)   Comment, (Gait/Stairs) unable to assess due to pain and weakness   Balance   Balance Comments unsteady /poor   Clinical Impression   Criteria for Skilled Therapeutic Intervention Yes, treatment indicated   PT Diagnosis (PT) decreased functional mobility   Influenced by the following impairments weakness, pain , decreased balance   Functional limitations due to impairments transfers, gait   Clinical Presentation (PT Evaluation Complexity) evolving   Clinical Presentation Rationale presents as medically diagnosed   Clinical Decision Making (Complexity) moderate complexity   Planned Therapy Interventions (PT) gait training;transfer training;progressive activity/exercise;strengthening   Risk & Benefits of therapy have been explained evaluation/treatment results reviewed   PT Total Evaluation Time   PT Eval, Moderate Complexity Minutes (08823) 12   Physical Therapy Goals   PT Frequency 5x/week   PT Predicted Duration/Target Date for Goal Attainment 08/29/24   PT Goals Transfers;Gait   PT: Transfers Moderate assist;Sit to/from stand;Bed to/from chair;Assistive device   PT: Gait Moderate assist;Rolling walker;25 feet   Therapeutic Activity   Therapeutic Activities: dynamic activities to improve functional performance Minutes (84202) 10   Symptoms Noted During/After Treatment Fatigue;Increased pain   Treatment Detail/Skilled Intervention additional attempts sit<>stand arm in arm with drawsheet to help lifts hips able to complet partial stand maxAx2, BP 96/55, pt reporting dizziness, pt instructed in seated AP, and LAQ   PT Discharge Planning   PT Plan bed mob, practise sit<>stand progress to pvit transfer to chair BLE ex   PT Discharge Recommendation (DC Rec) Transitional Care Facility   PT Rationale for DC Rec pt AX2 for very limited mobility   PT Brief overview of  current status maxAx2 sit<>stand partial stand, recommend marck lift for nursing for safe transfers

## 2024-08-22 NOTE — PLAN OF CARE
Goal Outcome Evaluation:       No acute issues overnight. Vitals stable; pt still requiring 6LNC to maintain oxygen saturation >90%. Chest tube with very scant drainage overnight; no air leak noted in chest tube. Pt with nonproductive weak cough. Pt on ATC Tylenol; declined PRN pain medication. Pt refusing repositioning, despite education on skin breakdown.

## 2024-08-22 NOTE — PROGRESS NOTES
[facility]    Medicine Progress Note - Hospitalist Service    Date of Admission:  8/17/2024    Assessment & Plan   74-year-old female with a past medical history of a flutter, COPD, ongoing tobacco abuse, previous pleural effusions and chronic pain admitted to the hospital with empyema; transferred from [facility] to [facility] for surgical management.     Empyema  --- s/p VATS - per surgery no true emphyema or rind found - no ability to expand lung  --- Repeat CT 08/20 shows pasty right pleural effusion s/p chest tube placement, moderate likely loculated/complicated residual as well as small associated right pneumothorax  Reexpansion of right upper lobe with persistent collapse of right middle and lower lobe segments.  Significant retained secretions greater in the right lower lobe. Increasing left pleural effusion  ---Plan was for bronchoscopy 08/21, not done as patient was requiring upto 10L oxygen, due to risk of being intubated  ---Started Xopenex, hypertonic saline. Volera - did not tolerate, Using flutter valve  ---Bronchoscopy in the future if improves with bronchial hygiene  --- having daily CXR   --- General surgery and pulmonology following  --- Continue chest tube, likely to be removed tomorrow 08/23, managed by surgery  --- first bronchoscopy 8/11 cultures showing E. coli and Candida albicans with pleural effusion cultures showing strep pneumo  --- ID followed at , changed antibiotics from ceftriaxone to Zosyn and fluconazole and recommended source control  --- Resume Eliquis  --- seen by SLP, recommend regular/thin liquids     Acute on chronic respiratory failure  Advanced COPD  Tobacco dependence  --- Has been on 4-5 L as opposed to usual 3 L at home  --- Has been on oral taper of steroids for her COPD  --- prednisone taper  --- Pulmonology following  --- encourage smoking cessation     Paroxysmal A-fib/a flutter; status post PPM  Aortic stenosis status post  "TAVR  Acute on chronic HFpEF  --- Previously  on Cardizem drip at   --- Currently on metoprolol and diltiazem and digoxin with hold parameters  --- She is off amiodarone due to QTc prolongation  --- Resume PTA Lasix at 20mg daily  --- Cardiology signed off 8/15  --echo 4/2024 with EF 55-60%   --- monitor on telemetry   --- mag ok     Deconditioning   --- Previously recommended to go to TCU and has bed there.  ---PT/OT recommend TCU     Normocytic anemia; overall stable     Hyperlipidemia--statin ordered     Diabetes mellitus, type II  --- Not on home meds  --- Last A1c 6.2  --- Continue insulin sliding scale          Diet: Fluid restriction 1800 ML FLUID  Regular Diet Adult    DVT Prophylaxis: DOAC  Chairez Catheter: Not present  Lines: PRESENT      PICC 08/10/24 Triple Lumen Right Brachial vein medial-Site Assessment: WDL      Cardiac Monitoring: None  Code Status: Full Code      Clinically Significant Risk Factors              # Hypoalbuminemia: Lowest albumin = 2.2 g/dL at 8/18/2024  7:04 AM, will monitor as appropriate     # Hypertension: Noted on problem list           # Overweight: Estimated body mass index is 25.53 kg/m  as calculated from the following:    Height as of 8/10/24: 1.651 m (5' 5\").    Weight as of this encounter: 69.6 kg (153 lb 7 oz).      # Financial/Environmental Concerns: none   # Pacemaker present             Disposition Plan     Medically Ready for Discharge: Anticipated in 2-4 Days             Lauren Willson MD  Hospitalist Service  [facility]  Securely message with [employer] (more info)  Text page via [facility] Paging/Directory   ______________________________________________________________________    Interval History   Patient was examined at the bedside, states she feels better today. Bronchoscopy not done due to risk of remaining intubated  Plan for chest tube removal tomorrow  Resumed PTA Lasix and eliquis    Physical Exam   Vital Signs: Temp: 98.4  F (36.9  C) " Temp src: Axillary BP: 97/57 Pulse: 82   Resp: 20 SpO2: 92 % O2 Device: Nasal cannula Oxygen Delivery: 5 LPM  Weight: 153 lbs 7.04 oz    General Appearance:  Pleasant frail, NAD, on 4-5 LPM NC  Respiratory: Relatively clear, mild rhonchi anteriorly.  Chest tube noted  Cardiovascular: irregular sounding today  GI: Soft and nontender without hepatosplenomegaly, masses palpable.  Skin: no significant lower extremity edema.  Frail skin with senile purpura and tattoos noted without open areas.  Other:  neuro grossly intact without focal deficits appreciated    Medical Decision Making       55 MINUTES SPENT BY ME on the date of service doing chart review, history, exam, documentation & further activities per the note.      Data     I have personally reviewed the following data over the past 24 hrs:    10.7  \   9.7 (L)   / 272     142 106 22.4 /  205 (H)   4.0 30 (H) 0.56 \       Imaging results reviewed over the past 24 hrs:   Recent Results (from the past 24 hour(s))   XR Chest Port 1 View    Narrative    EXAM: XR CHEST PORT 1 VIEW  LOCATION: [facility]  DATE: 8/22/2024    INDICATION: Recheck chest tube and pleural effusions. Postop thoracotomy and empyema drainage.  COMPARISON: CT chest and chest x-ray 8/20/2024 and older studies      Impression    IMPRESSION: Postthoracotomy changes on the right with large bore chest tube. Right upper extremity PICC line catheter in good position. Dual-lead left subclavian venous pacer. TAVR.    Shallower inspiration., Likely little change in the small, loculated hydropneumothorax in the right. Small right apical cap appears slightly decreased from before.    Increased atelectasis in the left base.

## 2024-08-22 NOTE — PROGRESS NOTES
Speech Language Therapy Discharge Summary    Reason for therapy discharge:    All goals and outcomes met, no further needs identified.    Progress towards therapy goal(s). See goals on Care Plan in [facility] electronic health record for goal details.  Goals met    Therapy recommendation(s):    No further therapy is recommended.Recommend regular textures and thin liquids with chin tuck.

## 2024-08-22 NOTE — TREATMENT PLAN
RESPIRATORY CARE NOTE    Pt on 5 lpm nc (changed from 4 lpm oximyzer), bs diminished R>L, fair congested cough. Refused to use volara,     Received Xopenex x3, nebusal x3, flutter valve x3, 1 set of 10 rep    BS post tx slight increased aeration    Instructed on doing FV Q 1 hr  Cont monitoring    Mulu Fuentes RT

## 2024-08-22 NOTE — PLAN OF CARE
"  Problem: Adult Inpatient Plan of Care  Goal: Plan of Care Review  Description: The Plan of Care Review/Shift note should be completed every shift.  The Outcome Evaluation is a brief statement about your assessment that the patient is improving, declining, or no change.  This information will be displayed automatically on your shift  note.  Outcome: Progressing  Flowsheets (Taken 8/21/2024 2217)  Plan of Care Reviewed With:   patient   family  Goal: Patient-Specific Goal (Individualized)  Description: You can add care plan individualizations to a care plan. Examples of Individualization might be:  \"Parent requests to be called daily at 9am for status\", \"I have a hard time hearing out of my right ear\", or \"Do not touch me to wake me up as it startles  me\".  Outcome: Progressing  Goal: Absence of Hospital-Acquired Illness or Injury  Outcome: Progressing  Intervention: Identify and Manage Fall Risk  Recent Flowsheet Documentation  Taken 8/21/2024 1600 by Mateo Zhu RN  Safety Promotion/Fall Prevention:   activity supervised   assistive device/personal items within reach   clutter free environment maintained   nonskid shoes/slippers when out of bed   toileting scheduled   treat reversible contributory factors   treat underlying cause  Intervention: Prevent Infection  Recent Flowsheet Documentation  Taken 8/21/2024 1600 by Mateo Zhu RN  Infection Prevention: hand hygiene promoted  Goal: Optimal Comfort and Wellbeing  Outcome: Progressing  Intervention: Monitor Pain and Promote Comfort  Recent Flowsheet Documentation  Taken 8/21/2024 2000 by Mateo Zhu, RN  Pain Management Interventions: medication offered but refused  Taken 8/21/2024 1922 by Mateo Zhu, RN  Pain Management Interventions: medication (see MAR)  Taken 8/21/2024 1600 by Mateo Zhu RN  Pain Management Interventions: medication offered but refused  Goal: Readiness for Transition of Care  Outcome: Progressing     Goal Outcome " Evaluation:      Plan of Care Reviewed With: patient, family    Pt A/O x 4. Vitally stable & saturating well on 6 LPM via NC - lung sounds diminished & congested, continues to have frequent non-productive cough. Off tele. Pt c/o pain secondary to chest tube, refuses pain meds other than tylenol/lidocaine. Blood sugars in range - no insulin required. K/Mag protocols for recheck in AM.     Mateo Zhu RN on 8/21/2024 at 10:21 PM

## 2024-08-22 NOTE — PROGRESS NOTES
08/22/24 1105   Appointment Info   Signing Clinician's Name / Credentials (OT) Nalini Sher OTR/L   Living Environment   People in Home child(david), adult  (son and DIL, DIL is PCA)   Self-Care   Usual Activity Tolerance moderate   Current Activity Tolerance poor   Equipment Currently Used at Home cane, straight;walker, standard   Activity/Exercise/Self-Care Comment IND for BADLs but frequently has assist from DIL when needed   Instrumental Activities of Daily Living (IADL)   IADL Comments assist for all IADLs   General Information   Onset of Illness/Injury or Date of Surgery 08/17/24   Referring Physician Antonia Baptiste MD   Patient/Family Therapy Goal Statement (OT) return to PLOF   Additional Occupational Profile Info/Pertinent History of Current Problem PMH a-flutter, COPD, ongoing tobacco abuse, previous pleural effusions and chronic pain. admitted to the hospital with empyema with chest tube placement   Existing Precautions/Restrictions fall;oxygen therapy device and L/min  (chest tube)   Cognitive Status Examination   Affect/Mental Status (Cognitive) WFL   Follows Commands follows one-step commands   Pain Assessment   Patient Currently in Pain Yes, see Vital Sign flowsheet   Range of Motion Comprehensive   General Range of Motion bilateral upper extremity ROM WFL   Strength Comprehensive (MMT)   Comment, General Manual Muscle Testing (MMT) Assessment significant weakness   Transfers   Transfers sit-stand transfer   Sit-Stand Transfer   Sit-Stand [place] (Transfers) dependent (less than 25% patient effort)   Sit/Stand Transfer Comments pt pushing from armrests - minimal ability to clear bottom from chair dispite Ax2   Balance   Balance Comments SBA for unsupported sitting in recliner   Activities of Daily Living   BADL Assessment/Intervention lower body dressing;toileting   Lower Body Dressing Assessment/Training   [place] Level (Lower Body Dressing) dependent (less than 25% patient effort)    Toileting   [place] Level (Toileting) dependent (less than 25% patient effort)   Clinical Impression   Criteria for Skilled Therapeutic Interventions Met (OT) Yes, treatment indicated   OT Diagnosis dec BADL IND   OT Problem List-Impairments impacting ADL problems related to;activity tolerance impaired;mobility;strength;pain   Assessment of Occupational Performance 3-5 Performance Deficits   Identified Performance Deficits dressing, toileting, bathing, household mobility, functional transfers   Planned Therapy Interventions (OT) ADL retraining;bed mobility training;strengthening;transfer training;home program guidelines;progressive activity/exercise   Clinical Decision Making Complexity (OT) detailed assessment/moderate complexity   Risk & Benefits of therapy have been explained evaluation/treatment results reviewed;participants included;patient   OT Total Evaluation Time   OT Eval, Moderate Complexity Minutes (10222) 10   OT Goals   Therapy Frequency (OT) 5 times/week   OT Predicted Duration/Target Date for Goal Attainment 08/29/24   OT Goals Lower Body Dressing;Toilet Transfer/Toileting   OT: Lower Body Dressing Minimal assist   OT: Toilet Transfer/Toileting Minimal assist;toilet transfer;cleaning and garment management   Interventions   Interventions Quick Adds Therapeutic Activity   Therapeutic Activities   Therapeutic Activity Minutes (47529) 10   Symptoms noted during/after treatment fatigue;increased pain;dizziness  (reports dizziness is d/t pain)   Treatment Detail/Skilled Intervention Attempted multiple transfer positions and techniques to progress ability to participate with increased time to assess vitals. SpO2 remains over 90% but difficulty getting good reading. BP soft -100's/50's with no change during activity. Trialed arm in arm with draw sheet for hips with no success despite Ax2. Final attempt with arm in arm - able to complete partial stand with total Ax2, 3rd assist present to place  cushion under pt. Max Ax2 to shift hips in chair.   OT Discharge Planning   OT Plan pre-med for pain, progress transfers as able, any ADLs: LB dressing - AE?, toilet transfers, g/h unsupported sitting   OT Discharge Recommendation (DC Rec) Transitional Care Facility   OT Rationale for DC Rec requires heavy assist of 2 for all transfers and BADLs   OT Brief overview of current status max-total Ax2   Total Session Time   Timed Code Treatment Minutes 10   Total Session Time (sum of timed and untimed services) 20

## 2024-08-22 NOTE — PROGRESS NOTES
"PULMONARY PROGRESS NOTE    Date / Time of Admission:  8/17/2024  1:56 PM  Assessment:   74yoF with severe emphysema on home O2 at 3L here with strep pneumoniae causing empyema. Unfortunately very difficult to get lung to re-expand. No rind seen during VATS 8/19 but lung is better inflated currently.     Active Problems:    Empyema (H)    Advance Directives:  Full code    Seen with her son in the room. She is sitting up in a chair, appears comfortable.  She is not coughing much, but she does with flutter valve.     Plan:   - continue Introduce Xopenex/hypertonic saline.  - pt did not tolerate [place] - okay to stop it  - she is good about using the flutter valve- holding off on bronch, will continue with airway clearance therapies   - She is down to 5L NC from 10L  - Chest tube mgmt per Sx    Subjective:   HPI:  Mary Kay Tejada is a 74 year old female with history of Emphysema on home O2 who presented with pneumonia. CT revealed large effusion that did not drain well with chest tubes but fluid found emppyema. She had VATs done 8/19 that found no rind but very difficult to get lung to inflate.   Had bronchoscopy at [facility] that was unremarkable but lung still not fully expanded. VATs improved right-sided expansion.         Allergies:   Allergies   Allergen Reactions    Celebrex [Celecoxib] Rash     patient had butterfly rash - \"lupus-like\"      Latex Rash        MEDS:  Scheduled Meds:  Current Facility-Administered Medications   Medication Dose Route Frequency Provider Last Rate Last Admin    acetaminophen (TYLENOL) tablet 975 mg  975 mg Oral Q8H Renée Soriano MD   975 mg at 08/22/24 0153    [Held by provider] apixaban ANTICOAGULANT (ELIQUIS) tablet 5 mg  5 mg Oral BID Angela Kaiser MD        artificial saliva (BIOTENE MT) solution 1 spray  1 spray Mouth/Throat 4x Daily Renée Soriano MD   1 spray at 08/20/24 1211    atorvastatin (LIPITOR) tablet 20 mg  20 mg Oral At Bedtime Renée Soriano MD   20 mg at " 08/21/24 2043    digoxin (LANOXIN) tablet 125 mcg  125 mcg Oral Daily Renée Soriano MD   125 mcg at 08/22/24 0904    diltiazem ER COATED BEADS (CARDIZEM CD/CARTIA XT) 24 hr capsule 120 mg  120 mg Oral Daily Renée Soriano MD   120 mg at 08/22/24 0903    enoxaparin ANTICOAGULANT (LOVENOX) injection 40 mg  40 mg Subcutaneous Q24H Renée Soriano MD   40 mg at 08/22/24 0903    fluconazole (DIFLUCAN) intermittent infusion 200 mg  200 mg Intravenous Q24H Renée Soriano  mL/hr at 08/21/24 1922 200 mg at 08/21/24 1922    fluticasone-vilanterol (BREO ELLIPTA) 200-25 MCG/ACT inhaler 1 puff  1 puff Inhalation Daily Renée Soriano MD   1 puff at 08/22/24 0902    gabapentin (NEURONTIN) capsule 600 mg  600 mg Oral TID Renée Soriano MD   600 mg at 08/22/24 0907    insulin aspart (NovoLOG) injection (RAPID ACTING)  1-10 Units Subcutaneous TID AC Renée Soriano MD   8 Units at 08/18/24 1713    insulin aspart (NovoLOG) injection (RAPID ACTING)  1-7 Units Subcutaneous At Bedtime Renée Soriano MD   4 Units at 08/18/24 2026    levalbuterol (XOPENEX) neb solution 1.25 mg  1.25 mg Nebulization 4x Daily Carmelita Buckley MD   1.25 mg at 08/22/24 0723    Lidocaine (LIDOCARE) 4 % Patch 1 patch  1 patch Transdermal Q24h Antonia Baptiste MD   1 patch at 08/21/24 1923    metoprolol tartrate (LOPRESSOR) tablet 50 mg  50 mg Oral BID Renée Soriano MD   50 mg at 08/22/24 0903    pantoprazole (PROTONIX) EC tablet 40 mg  40 mg Oral QAM AC Renée Soriano MD   40 mg at 08/22/24 0920    piperacillin-tazobactam (ZOSYN) 3.375 g vial to attach to  mL bag  3.375 g Intravenous Q8H Antonia Baptiste MD   3.375 g at 08/22/24 0459    polyethylene glycol (MIRALAX) Packet 17 g  17 g Oral Daily Renée Soriano MD   17 g at 08/22/24 0903    predniSONE (DELTASONE) tablet 20 mg  20 mg Oral Daily Antonia Baptiste MD   20 mg at 08/22/24 0903    Followed by    [START ON 8/25/2024] predniSONE (DELTASONE) tablet 10 mg  10 mg Oral Daily Antonia Baptiste MD         senna-docusate (SENOKOT-S/PERICOLACE) 8.6-50 MG per tablet 1 tablet  1 tablet Oral BID Renée Soriano MD   1 tablet at 08/22/24 0903    sodium chloride (NEBUSAL) 3 % neb solution 3 mL  3 mL Nebulization 4x daily Carmelita Buckley MD   3 mL at 08/22/24 0724    sodium chloride (PF) 0.9% PF flush 10 mL  10 mL Intracatheter Q8H Lauren Willson MD   10 mL at 08/22/24 0156    sodium chloride (PF) 0.9% PF flush 10-40 mL  10-40 mL Intracatheter Q7 Days Renée Soriano MD   10 mL at 08/17/24 1703    sterile talc (STERITALC) powder for sclerosing 4 g  4 g INTRAPLEURAL Once Renée Soriano MD        sucralfate (CARAFATE) tablet 1 g  1 g Oral TID AC Renée Soriano MD   1 g at 08/22/24 0617     Continuous Infusions:  Current Facility-Administered Medications   Medication Dose Route Frequency Provider Last Rate Last Admin     PRN Meds:.  Current Facility-Administered Medications   Medication Dose Route Frequency Provider Last Rate Last Admin    acetaminophen (TYLENOL) tablet 650 mg  650 mg Oral Q4H PRN Renée Soriano MD   650 mg at 08/22/24 0616    Or    acetaminophen (TYLENOL) Suppository 650 mg  650 mg Rectal Q4H PRN Renée Soriano MD        acetaminophen (TYLENOL) tablet 650 mg  650 mg Oral Q4H PRN Renée Soriano MD        [Held by provider] albuterol (PROVENTIL HFA/VENTOLIN HFA) inhaler  2 puff Inhalation Q4H PRN Angela Kaiser MD        benzocaine-menthol (CHLORASEPTIC) 6-10 MG lozenge 1 lozenge  1 lozenge Buccal Q1H PRN Renée Soriano MD        bisacodyl (DULCOLAX) suppository 10 mg  10 mg Rectal Daily PRN Renée Soriano MD        calcium carbonate (TUMS) chewable tablet 1,000 mg  1,000 mg Oral 4x Daily PRN Renée Soriano MD        glucose gel 15-30 g  15-30 g Oral Q15 Min PRN Renée Soriano MD        Or    dextrose 50 % injection 25-50 mL  25-50 mL Intravenous Q15 Min PRN Renée Soriano MD        Or    glucagon injection 1 mg  1 mg Subcutaneous Q15 Min PRN Renée Soriano MD        HYDROmorphone (DILAUDID)  injection 0.2 mg  0.2 mg Intravenous Q2H PRN Renée Soriano MD   0.2 mg at 08/20/24 0746    Or    HYDROmorphone (DILAUDID) injection 0.4 mg  0.4 mg Intravenous Q2H PRN Renée Soriano MD        ipratropium - albuterol 0.5 mg/2.5 mg/3 mL (DUONEB) neb solution 3 mL  3 mL Nebulization Q4H PRN Antonia Baptiste MD        ipratropium - albuterol 0.5 mg/2.5 mg/3 mL (DUONEB) neb solution 3 mL  3 mL Nebulization Q4H PRN Renée Soriano MD        lidocaine (LMX4) cream   Topical Q1H PRN Renée Soriano MD        lidocaine 1 % 0.1-1 mL  0.1-1 mL Other Q1H PRN Renée Soriano MD        magnesium hydroxide (MILK OF MAGNESIA) suspension 30 mL  30 mL Oral Daily PRN Renée Soriano MD        melatonin tablet 1 mg  1 mg Oral At Bedtime PRN Renée Soriano MD        miconazole (MICATIN) 2 % powder   Topical TID PRN Renée Soriano MD        naloxone (NARCAN) injection 0.2 mg  0.2 mg Intravenous Q2 Min PRRenée Carmen MD        naloxone (NARCAN) injection 0.2 mg  0.2 mg Intramuscular Q2 Min PRRenée Carmen MD        naloxone (NARCAN) injection 0.4 mg  0.4 mg Intravenous Q2 Min PRN Renée Soriano MD        naloxone (NARCAN) injection 0.4 mg  0.4 mg Intramuscular Q2 Min PRRenée Carmen MD        ondansetron (ZOFRAN ODT) ODT tab 4 mg  4 mg Oral Q6H PRN Renée Soriano MD        Or    ondansetron (ZOFRAN) injection 4 mg  4 mg Intravenous Q6H PRN Renée Soriano MD        polyethylene glycol (MIRALAX) Packet 17 g  17 g Oral Daily PRN Renée Soriano MD        prochlorperazine (COMPAZINE) injection 5 mg  5 mg Intravenous Q6H PRN Renée Soriano MD        Or    prochlorperazine (COMPAZINE) tablet 5 mg  5 mg Oral Q6H PRN Renée Soriano MD        prochlorperazine (COMPAZINE) injection 5 mg  5 mg Intravenous Q6H PRN Renée Soriano MD        Or    prochlorperazine (COMPAZINE) tablet 5 mg  5 mg Oral Q6H PRN Renée Soriano MD        Or    prochlorperazine (COMPAZINE) suppository 12.5 mg  12.5 mg Rectal Q12H PRN Renée Soriano MD         senna-docusate (SENOKOT-S/PERICOLACE) 8.6-50 MG per tablet 1 tablet  1 tablet Oral BID PRN Renée Soriano MD        Or    senna-docusate (SENOKOT-S/PERICOLACE) 8.6-50 MG per tablet 2 tablet  2 tablet Oral BID PRN Renée Soriano MD        sodium chloride (PF) 0.9% PF flush 10-20 mL  10-20 mL Intracatheter q1 min prn Renée Soriano MD   10 mL at 08/21/24 1228    sodium chloride (PF) 0.9% PF flush 10-40 mL  10-40 mL Intracatheter Q1H PRN Renée Soriano MD   10 mL at 08/21/24 1231    sodium chloride (PF) 0.9% PF flush 3 mL  3 mL Intracatheter q1 min prn Renée Soriano MD        sodium chloride (PF) 0.9% PF flush 3 mL  3 mL Intracatheter q1 min prn Renée Soriano MD        traMADol (ULTRAM) tablet 50 mg  50 mg Oral Q6H PRN Renée Soriano MD           Objective:   VITALS:  /55   Pulse 84   Temp 98.4  F (36.9  C) (Axillary)   Resp 20   Wt 69.6 kg (153 lb 7 oz)   LMP  (LMP Unknown)   SpO2 92%   BMI 25.53 kg/m    EXAM:    GENERAL APPEARANCE: Alert, no acute distress  HENT: NC in place  RESP: lungs clear to auscultation bilaterally, right sided chest tube in place with dressing  CV: regular rate and rhythm   ABDOMEN: normal bowel sounds, soft, nontender   SKIN: no suspicious lesions or rashes on visible skin  NEURO: Alert, oriented x 3, speech and mentation normal    I&O:    Date 08/20/24 0700 - 08/21/24 0659   Shift 5347-4593 5710-9360 6902-3182 24 Hour Total   INTAKE   I.V. 216.67   216.67   Shift Total(mL/kg) 216.67(3.1)   216.67(3.1)   OUTPUT   Chest Tube(mL/kg) 120(1.71)   120(1.71)   Shift Total(mL/kg) 120(1.71)   120(1.71)   Weight (kg) 70 70 70 70       Data Review:    Imaging: personally reviewed  EXAM: CT CHEST W/O CONTRAST  LOCATION: [facility]  DATE: 8/20/2024     INDICATION: ?LLL mass or occlusion, unable to re expand lung.  COMPARISON: 8/16/2024  TECHNIQUE: CT chest without IV contrast. Multiplanar reformats were obtained. Dose reduction techniques were used. Lack  of intravenous contrast significantly limits exam.  CONTRAST: None.     FINDINGS:   LUNGS AND PLEURA: Interval placement of large bore right-sided chest tube with decrease in right pleural effusion. There is a moderate loculated appearing residual. Small anterior pneumothorax now noted. There is improved aeration of the right upper   lobe, with persistent right middle lobe and lower lobe collapse and mediastinal shift. Centrilobular emphysematous change with an upper lobe predominance. Increasing, now moderate left pleural effusion with compressive basilar atelectasis. Moderate   retained secretions in the central airways. Significant retained secretions in right lower lobe bronchi.     MEDIASTINUM/AXILLAE: Right-sided PICC line terminates in the distal SVC. Cardiac pacemaker. TAVR.     CORONARY ARTERY CALCIFICATION: Severe.     UPPER ABDOMEN: Right adrenal nodular hyperplasia. Significant enlargement of the left adrenal gland, potentially containing multiple low-attenuation nodules measuring up to 2 cm in size. Adenomatous hyperplasia favored.     MUSCULOSKELETAL: No acute bony abnormalities.                                                                      IMPRESSION:   1.  Decreased right pleural effusion status post chest tube placement, with moderate, likely loculated/complicated residual, as well as small associated right pneumothorax now evident.  2.  Reexpansion of the right upper lobe with persistent collapse of right middle and lower lobe segments. Significant retained secretions, greatest in the right lower lobe.  3.  Increasing left pleural effusion.  4.  Additional findings as above.      Results for orders placed or performed during the hospital encounter of 08/17/24   Basic metabolic panel   Result Value Ref Range    Sodium 142 135 - 145 mmol/L    Potassium 4.0 3.4 - 5.3 mmol/L    Chloride 106 98 - 107 mmol/L    Carbon Dioxide (CO2) 30 (H) 22 - 29 mmol/L    Anion Gap 6 (L) 7 - 15 mmol/L    Urea  Nitrogen 22.4 8.0 - 23.0 mg/dL    Creatinine 0.56 0.51 - 0.95 mg/dL    GFR Estimate >90 >60 mL/min/1.73m2    Calcium 8.4 (L) 8.8 - 10.4 mg/dL    Glucose 114 (H) 70 - 99 mg/dL     Lab Results   Component Value Date    WBC 10.7 08/22/2024    HGB 9.7 (L) 08/22/2024    HCT 34.1 (L) 08/22/2024    MCV 86 08/22/2024     08/22/2024     Last Arterial Blood Gas:  Recent Labs   Lab 08/17/24  1804   O2PER 32

## 2024-08-23 ENCOUNTER — APPOINTMENT (OUTPATIENT)
Dept: OCCUPATIONAL THERAPY | Facility: HOSPITAL | Age: 74
DRG: 166 | End: 2024-08-23
Attending: FAMILY MEDICINE
Payer: COMMERCIAL

## 2024-08-23 ENCOUNTER — APPOINTMENT (OUTPATIENT)
Dept: PHYSICAL THERAPY | Facility: HOSPITAL | Age: 74
DRG: 166 | End: 2024-08-23
Attending: FAMILY MEDICINE
Payer: COMMERCIAL

## 2024-08-23 ENCOUNTER — APPOINTMENT (OUTPATIENT)
Dept: RADIOLOGY | Facility: HOSPITAL | Age: 74
DRG: 166 | End: 2024-08-23
Payer: COMMERCIAL

## 2024-08-23 LAB
ANION GAP SERPL CALCULATED.3IONS-SCNC: 5 MMOL/L (ref 7–15)
BUN SERPL-MCNC: 18.1 MG/DL (ref 8–23)
CALCIUM SERPL-MCNC: 8.2 MG/DL (ref 8.8–10.4)
CHLORIDE SERPL-SCNC: 104 MMOL/L (ref 98–107)
CREAT SERPL-MCNC: 0.55 MG/DL (ref 0.51–0.95)
EGFRCR SERPLBLD CKD-EPI 2021: >90 ML/MIN/1.73M2
GLUCOSE BLDC GLUCOMTR-MCNC: 114 MG/DL (ref 70–99)
GLUCOSE BLDC GLUCOMTR-MCNC: 144 MG/DL (ref 70–99)
GLUCOSE BLDC GLUCOMTR-MCNC: 252 MG/DL (ref 70–99)
GLUCOSE BLDC GLUCOMTR-MCNC: 272 MG/DL (ref 70–99)
GLUCOSE SERPL-MCNC: 119 MG/DL (ref 70–99)
HCO3 SERPL-SCNC: 31 MMOL/L (ref 22–29)
MAGNESIUM SERPL-MCNC: 1.8 MG/DL (ref 1.7–2.3)
POTASSIUM SERPL-SCNC: 3.5 MMOL/L (ref 3.4–5.3)
SODIUM SERPL-SCNC: 140 MMOL/L (ref 135–145)

## 2024-08-23 PROCEDURE — 99232 SBSQ HOSP IP/OBS MODERATE 35: CPT | Performed by: INTERNAL MEDICINE

## 2024-08-23 PROCEDURE — 999N000157 HC STATISTIC RCP TIME EA 10 MIN

## 2024-08-23 PROCEDURE — 250N000013 HC RX MED GY IP 250 OP 250 PS 637: Performed by: SPECIALIST

## 2024-08-23 PROCEDURE — 94640 AIRWAY INHALATION TREATMENT: CPT | Mod: 76

## 2024-08-23 PROCEDURE — 99231 SBSQ HOSP IP/OBS SF/LOW 25: CPT | Mod: 24

## 2024-08-23 PROCEDURE — 80048 BASIC METABOLIC PNL TOTAL CA: CPT | Performed by: SPECIALIST

## 2024-08-23 PROCEDURE — 120N000013 HC R&B IMCU

## 2024-08-23 PROCEDURE — 250N000011 HC RX IP 250 OP 636: Performed by: FAMILY MEDICINE

## 2024-08-23 PROCEDURE — 250N000011 HC RX IP 250 OP 636: Performed by: SPECIALIST

## 2024-08-23 PROCEDURE — 99233 SBSQ HOSP IP/OBS HIGH 50: CPT | Performed by: STUDENT IN AN ORGANIZED HEALTH CARE EDUCATION/TRAINING PROGRAM

## 2024-08-23 PROCEDURE — 97530 THERAPEUTIC ACTIVITIES: CPT | Mod: GP

## 2024-08-23 PROCEDURE — 250N000012 HC RX MED GY IP 250 OP 636 PS 637: Performed by: FAMILY MEDICINE

## 2024-08-23 PROCEDURE — 250N000013 HC RX MED GY IP 250 OP 250 PS 637: Performed by: FAMILY MEDICINE

## 2024-08-23 PROCEDURE — 83735 ASSAY OF MAGNESIUM: CPT | Performed by: STUDENT IN AN ORGANIZED HEALTH CARE EDUCATION/TRAINING PROGRAM

## 2024-08-23 PROCEDURE — 250N000013 HC RX MED GY IP 250 OP 250 PS 637: Performed by: STUDENT IN AN ORGANIZED HEALTH CARE EDUCATION/TRAINING PROGRAM

## 2024-08-23 PROCEDURE — 250N000009 HC RX 250: Performed by: INTERNAL MEDICINE

## 2024-08-23 PROCEDURE — 94640 AIRWAY INHALATION TREATMENT: CPT

## 2024-08-23 PROCEDURE — 71045 X-RAY EXAM CHEST 1 VIEW: CPT

## 2024-08-23 PROCEDURE — 97535 SELF CARE MNGMENT TRAINING: CPT | Mod: GO

## 2024-08-23 RX ORDER — FUROSEMIDE 20 MG
40 TABLET ORAL DAILY
Status: DISCONTINUED | OUTPATIENT
Start: 2024-08-24 | End: 2024-08-29

## 2024-08-23 RX ADMIN — PIPERACILLIN AND TAZOBACTAM 3.38 G: 3; .375 INJECTION, POWDER, FOR SOLUTION INTRAVENOUS at 12:59

## 2024-08-23 RX ADMIN — METOPROLOL TARTRATE 50 MG: 25 TABLET, FILM COATED ORAL at 20:50

## 2024-08-23 RX ADMIN — Medication 1 SPRAY: at 20:51

## 2024-08-23 RX ADMIN — DIGOXIN 125 MCG: 125 TABLET ORAL at 09:42

## 2024-08-23 RX ADMIN — GABAPENTIN 600 MG: 300 CAPSULE ORAL at 08:59

## 2024-08-23 RX ADMIN — METOPROLOL TARTRATE 50 MG: 25 TABLET, FILM COATED ORAL at 08:59

## 2024-08-23 RX ADMIN — POLYETHYLENE GLYCOL 3350 17 G: 17 POWDER, FOR SOLUTION ORAL at 09:03

## 2024-08-23 RX ADMIN — LIDOCAINE 1 PATCH: 4 PATCH TOPICAL at 20:50

## 2024-08-23 RX ADMIN — SODIUM CHLORIDE SOLN NEBU 3% 3 ML: 3 NEBU SOLN at 11:56

## 2024-08-23 RX ADMIN — SENNOSIDES AND DOCUSATE SODIUM 1 TABLET: 8.6; 5 TABLET ORAL at 08:59

## 2024-08-23 RX ADMIN — ACETAMINOPHEN 650 MG: 325 TABLET ORAL at 09:15

## 2024-08-23 RX ADMIN — PIPERACILLIN AND TAZOBACTAM 3.38 G: 3; .375 INJECTION, POWDER, FOR SOLUTION INTRAVENOUS at 04:06

## 2024-08-23 RX ADMIN — SUCRALFATE 1 G: 1 TABLET ORAL at 17:43

## 2024-08-23 RX ADMIN — SUCRALFATE 1 G: 1 TABLET ORAL at 07:07

## 2024-08-23 RX ADMIN — GABAPENTIN 600 MG: 300 CAPSULE ORAL at 13:00

## 2024-08-23 RX ADMIN — FUROSEMIDE 20 MG: 20 TABLET ORAL at 09:00

## 2024-08-23 RX ADMIN — INSULIN ASPART 3 UNITS: 100 INJECTION, SOLUTION INTRAVENOUS; SUBCUTANEOUS at 20:52

## 2024-08-23 RX ADMIN — DILTIAZEM HYDROCHLORIDE 120 MG: 120 CAPSULE, EXTENDED RELEASE ORAL at 09:00

## 2024-08-23 RX ADMIN — FLUCONAZOLE 200 MG: 2 INJECTION, SOLUTION INTRAVENOUS at 19:07

## 2024-08-23 RX ADMIN — LEVALBUTEROL HYDROCHLORIDE 1.25 MG: 1.25 SOLUTION RESPIRATORY (INHALATION) at 11:55

## 2024-08-23 RX ADMIN — PREDNISONE 20 MG: 20 TABLET ORAL at 09:00

## 2024-08-23 RX ADMIN — SODIUM CHLORIDE SOLN NEBU 3% 3 ML: 3 NEBU SOLN at 08:20

## 2024-08-23 RX ADMIN — PANTOPRAZOLE SODIUM 40 MG: 40 TABLET, DELAYED RELEASE ORAL at 07:07

## 2024-08-23 RX ADMIN — FLUTICASONE FUROATE AND VILANTEROL TRIFENATATE 1 PUFF: 200; 25 POWDER RESPIRATORY (INHALATION) at 08:59

## 2024-08-23 RX ADMIN — ATORVASTATIN CALCIUM 20 MG: 10 TABLET, FILM COATED ORAL at 20:50

## 2024-08-23 RX ADMIN — PIPERACILLIN AND TAZOBACTAM 3.38 G: 3; .375 INJECTION, POWDER, FOR SOLUTION INTRAVENOUS at 21:13

## 2024-08-23 RX ADMIN — LEVALBUTEROL HYDROCHLORIDE 1.25 MG: 1.25 SOLUTION RESPIRATORY (INHALATION) at 08:21

## 2024-08-23 RX ADMIN — GABAPENTIN 600 MG: 300 CAPSULE ORAL at 20:50

## 2024-08-23 RX ADMIN — ACETAMINOPHEN 650 MG: 325 TABLET ORAL at 21:12

## 2024-08-23 RX ADMIN — APIXABAN 5 MG: 5 TABLET, FILM COATED ORAL at 09:00

## 2024-08-23 RX ADMIN — APIXABAN 5 MG: 5 TABLET, FILM COATED ORAL at 20:50

## 2024-08-23 ASSESSMENT — ACTIVITIES OF DAILY LIVING (ADL)
ADLS_ACUITY_SCORE: 47
ADLS_ACUITY_SCORE: 49
ADLS_ACUITY_SCORE: 47
ADLS_ACUITY_SCORE: 49
ADLS_ACUITY_SCORE: 47
ADLS_ACUITY_SCORE: 49
ADLS_ACUITY_SCORE: 47
ADLS_ACUITY_SCORE: 49
ADLS_ACUITY_SCORE: 49
ADLS_ACUITY_SCORE: 47
ADLS_ACUITY_SCORE: 49
ADLS_ACUITY_SCORE: 47

## 2024-08-23 NOTE — PLAN OF CARE
Problem: Gas Exchange Impaired  Goal: Optimal Gas Exchange  8/23/2024 1619 by Elizabeth Cruz RN  Outcome: Progressing  8/23/2024 1618 by Elizabeth Cruz RN  Outcome: Progressing  8/23/2024 1617 by Elizabeth Cruz RN  Outcome: Progressing  Intervention: Optimize Oxygenation and Ventilation  Recent Flowsheet Documentation  Taken 8/23/2024 0930 by Elizabeth Cruz RN  Head of Bed (HOB) Positioning: HOB at 20-30 degrees     Problem: Comorbidity Management  Goal: Maintenance of COPD Symptom Control  8/23/2024 1619 by Elizabeth Cruz RN  Outcome: Progressing  8/23/2024 1618 by Elizabeth Cruz RN  Outcome: Progressing  8/23/2024 1617 by Elizabeth Cruz RN  Outcome: Progressing  Goal: Blood Glucose Levels Within Targeted Range  8/23/2024 1619 by Elizabeth Cruz RN  Outcome: Progressing  8/23/2024 1618 by Elizabeth Cruz RN  Outcome: Progressing  8/23/2024 1617 by Elizabeth Cruz RN  Outcome: Progressing  Goal: Blood Pressure in Desired Range  8/23/2024 1619 by Elizabeth Cruz RN  Outcome: Progressing  8/23/2024 1618 by Elizabeth Cruz RN  Outcome: Progressing  8/23/2024 1617 by Elizabeth Cruz RN  Outcome: Progressing     Problem: Respiratory Compromise (Pneumothorax)  Goal: Optimal Oxygenation and Ventilation  Outcome: Progressing   Goal Outcome Evaluation:       Pt has CT in place to water seal. No measurable drainage in atrium this shift, some noted in tube. No further drainage from CT dressing site that was changed by PA mid shift due to leaking noted on linen and when up with PT. Pt on 4L O2. Coughing up clear phlegm.

## 2024-08-23 NOTE — PLAN OF CARE
Problem: Adult Inpatient Plan of Care  Goal: Plan of Care Review  Description: The Plan of Care Review/Shift note should be completed every shift.  The Outcome Evaluation is a brief statement about your assessment that the patient is improving, declining, or no change.  This information will be displayed automatically on your shift  note.  Outcome: Progressing     Problem: Adult Inpatient Plan of Care  Goal: Optimal Comfort and Wellbeing  Outcome: Progressing  Intervention: Provide Person-Centered Care  Recent Flowsheet Documentation  Taken 8/23/2024 0100 by Debbie Chu RN  Trust Relationship/Rapport:   care explained   questions answered   questions encouraged   reassurance provided   thoughts/feelings acknowledged     Problem: Comorbidity Management  Goal: Maintenance of COPD Symptom Control  Outcome: Progressing     Problem: Comorbidity Management  Goal: Blood Glucose Levels Within Targeted Range  Outcome: Progressing   Goal Outcome Evaluation:  Patient is alert and oriented x4 and makes her needs known. Denied pain, shortness of breath with activity. Chest tube to water seal. Plan of care reviewed.

## 2024-08-23 NOTE — PROGRESS NOTES
INFECTIOUS DISEASE FOLLOW UP NOTE      ASSESSMENT:  History of COPD.  History of aortic stenosis status post TAVR.  Status post pacemaker placement  Recent history of COVID-19 infection complicated with secondary bacterial pneumonia in middle 2024.  Sputum cultures on 2024 with E. coli.  Right upper lobe collapse status post thoracentesis on 7/15/2024.  Readmitted with recurrent right-sided pleural effusion status post bronchoscopy on 2024.  Cultures with E. coli and Candida albicans.  Pleural effusion cultures positive with Streptococcus pneumonia.  Now s/p VATS, unable to inflate lung. Bronch deferred due to O2 status.      Yeast from BAL typically colonizer.      PLAN:  Zosyn, fluc for now     Ceftriaxone should be adequate for pneumococcus and E coli. May not need to cover anaerobes as we do when pleural fluid grows microaerophilic strep such as strep anginosus.     Expect about 4 weeks IV antibiotics from chest tube placement. Likely ceftriaxone IV    Please call if questions over the weekend. We will check in next week.        Ravindra Bacon MD  [facility]  Contact via [employer] or [facility] [phone]  ______________________________________________________________________    SUBJECTIVE / INTERVAL HISTORY: feels better. Cough productive. Temp ok. Pain at tube site. 4L.     ROS: deconditioned. All other systems negative except as listed above.        OBJECTIVE:  /57   Pulse 82   Temp 97.6  F (36.4  C) (Oral)   Resp 18   Wt 69.6 kg (153 lb 7 oz)   LMP  (LMP Unknown)   SpO2 94%   BMI 25.53 kg/m         Vital Signs  Temp: 97.4  F (36.3  C)  Temp src: Oral  Resp: 20  Pulse: 82  Pulse Rate Source: Monitor  BP: 102/59  BP Location: Left arm    Temp (24hrs), Av.3  F (36.8  C), Min:97.4  F (36.3  C), Max:99.1  F (37.3  C)      GEN: No acute distress.  O2 NC.  RESPIRATORY:  Chest tube on right.  CARDIOVASCULAR:  regular  ABDOMEN:   deferred  EXTREMITIES: No edema.  SKIN/HAIR/NAILS:  No rashes  IV: PICC        Antibiotics:  pip/tazo, fluc  On either zosyn or ceftri since 8/10    Pertinent labs:    Recent Labs   Lab 08/22/24  0500 08/21/24  0529 08/20/24  0406   WBC 10.7 12.4* 11.9*   HGB 9.7* 10.1* 10.0*   HCT 34.1* 36.1 35.8    273 271        Recent Labs   Lab 08/23/24  0718 08/22/24  0500 08/21/24  0529    142 144   CO2 31* 30* 30*   BUN 18.1 22.4 16.0          Lab Results   Component Value Date    ALT 31 08/18/2024    AST 20 08/18/2024    ALKPHOS 105 08/18/2024         MICROBIOLOGY DATA:  Susceptibility data from last 90 days.  Collected Specimen Info Organism Ampicillin Ampicillin/Sulbactam Azithromycin Cefepime Ceftazidime Ceftriaxone Ceftriaxone (meningitis) Ceftriaxone (non-meningitis) Ciprofloxacin Clindamycin Gentamicin Levofloxacin Meropenem   08/11/24 Bronchial Alveolar Lavage from Lung, Upper Lobe, Right Escherichia coli                  Normal anna marie                08/11/24 Bronchial Alveolar Lavage from Lung, Right Yeast                08/11/24 Bronchial Alveolar Lavage from Lung, Lower Lobe, Right Escherichia coli                  Normal anna marie                08/11/24 Bronchial Alveolar Lavage from Lung, Right Candida albicans                08/10/24 Sputum from Expectorate Streptococcus pneumoniae    S     S  S   S   S      Escherichia coli  S  S   S  S  S    S   S  S  S   08/10/24 Pleural fluid from Pleural Cavity, Right Streptococcus pneumoniae    S     S  S   S   S    07/13/24 Sputum from Expectorate Escherichia coli  S  S   S  S  S    S   S  S  S     Normal anna marie                  Collected Specimen Info Organism Penicillin (meningitis) Penicillin (non-meningitis) Penicillin(oral) Piperacillin/Tazobactam Tobramycin Trimethoprim/Sulfamethoxazole  Vancomycin   08/11/24 Bronchial Alveolar Lavage from Lung, Upper Lobe, Right Escherichia coli            Normal anna marie          08/11/24 Bronchial Alveolar Lavage from Lung,  Right Yeast          08/11/24 Bronchial Alveolar Lavage from Lung, Lower Lobe, Right Escherichia coli            Normal anna marie          08/11/24 Bronchial Alveolar Lavage from Lung, Right Candida albicans          08/10/24 Sputum from Expectorate Streptococcus pneumoniae  S  S  S     S     Escherichia coli     S  S  S    08/10/24 Pleural fluid from Pleural Cavity, Right Streptococcus pneumoniae  S  S  S     S   07/13/24 Sputum from Expectorate Escherichia coli     S  S  S      Normal anna marie              RADIOLOGY:  XR Chest Port 1 View    Result Date: 8/23/2024  EXAM: XR CHEST PORT 1 VIEW LOCATION: [facility] DATE: 8/23/2024 INDICATION: Recheck chest tube and right lung s p VATS and placement of water seal done on 8 22 at 1220 COMPARISON: Yesterday     IMPRESSION: Postthoracotomy changes on the right with large bore chest tube. Right upper extremity PICC line catheter in good position. Dual-lead left subclavian venous pacer. TAVR.  Shallower inspiration., Likely little change in the small, loculated hydropneumothorax in the right. Small right apical cap appears unchanged. Stable left pleural effusion with infiltrate or atelectasis in the left base.     XR Chest Port 1 View    Result Date: 8/22/2024  EXAM: XR CHEST PORT 1 VIEW LOCATION: [facility] DATE: 8/22/2024 INDICATION: Recheck chest tube and pleural effusions. Postop thoracotomy and empyema drainage. COMPARISON: CT chest and chest x-ray 8/20/2024 and older studies     IMPRESSION: Postthoracotomy changes on the right with large bore chest tube. Right upper extremity PICC line catheter in good position. Dual-lead left subclavian venous pacer. TAVR. Shallower inspiration., Likely little change in the small, loculated hydropneumothorax in the right. Small right apical cap appears slightly decreased from before. Increased atelectasis in the left base.    CT Chest w/o Contrast    Result Date: 8/20/2024  EXAM: CT CHEST  W/O CONTRAST LOCATION: [facility] DATE: 8/20/2024 INDICATION: ?LLL mass or occlusion, unable to re expand lung. COMPARISON: 8/16/2024 TECHNIQUE: CT chest without IV contrast. Multiplanar reformats were obtained. Dose reduction techniques were used. Lack of intravenous contrast significantly limits exam. CONTRAST: None. FINDINGS: LUNGS AND PLEURA: Interval placement of large bore right-sided chest tube with decrease in right pleural effusion. There is a moderate loculated appearing residual. Small anterior pneumothorax now noted. There is improved aeration of the right upper lobe, with persistent right middle lobe and lower lobe collapse and mediastinal shift. Centrilobular emphysematous change with an upper lobe predominance. Increasing, now moderate left pleural effusion with compressive basilar atelectasis. Moderate retained secretions in the central airways. Significant retained secretions in right lower lobe bronchi. MEDIASTINUM/AXILLAE: Right-sided PICC line terminates in the distal SVC. Cardiac pacemaker. TAVR. CORONARY ARTERY CALCIFICATION: Severe. UPPER ABDOMEN: Right adrenal nodular hyperplasia. Significant enlargement of the left adrenal gland, potentially containing multiple low-attenuation nodules measuring up to 2 cm in size. Adenomatous hyperplasia favored. MUSCULOSKELETAL: No acute bony abnormalities.     IMPRESSION: 1.  Decreased right pleural effusion status post chest tube placement, with moderate, likely loculated/complicated residual, as well as small associated right pneumothorax now evident. 2.  Reexpansion of the right upper lobe with persistent collapse of right middle and lower lobe segments. Significant retained secretions, greatest in the right lower lobe. 3.  Increasing left pleural effusion. 4.  Additional findings as above.     XR Chest Port 1 View    Result Date: 8/20/2024  EXAM: XR CHEST PORT 1 VIEW LOCATION: [facility] DATE: 8/20/2024  INDICATION: Recheck chest tube and pleural effusions. Postop thoracotomy and empyema drainage. COMPARISON: 8/19/2024 and older studies, CT chest 8/16/2024 and older studies     IMPRESSION: Postthoracotomy changes with large bore right-sided chest tube. Removal of the right basilar pigtail chest tube. Dual-lead left subclavian venous pacer, TAVR and right upper extremity PICC line catheter remain in good position. There has been only partial reexpansion of the right upper lobe and apparently non of the right lower lobe. Likely small pneumothorax outlining collapsed lung in the right lateral costophrenic angle. Small right apical pleural cap persists. Volume loss with marked shift of the mediastinum into the right chest has only slightly decreased. Minimal atelectasis in the left base behind the heart with trace effusion again noted. No signs of pneumonia or failure.    XR Chest Port 1 View    Result Date: 8/19/2024  EXAM: XR CHEST PORT 1 VIEW LOCATION: [facility] DATE: 8/19/2024 INDICATION: Recheck chest tube and pleural effusions COMPARISON: August 18, 2024     IMPRESSION: Stable pacemaker, TAVR, right chest tube, right PICC. Minimal change in near complete opacification of the right hemithorax with volume loss.    XR Chest Port 1 View    Result Date: 8/18/2024  EXAM: XR CHEST PORT 1 VIEW LOCATION: [facility] DATE: 8/18/2024 INDICATION: Recheck chest tube and pleural effusions COMPARISON: 8/17/2024     IMPRESSION: No change since yesterday. Small caliber chest tube projected at the right lung base unchanged. Complete opacification of the right hemithorax with volume loss unchanged. Right PICC line tip in low SVC. Transvenous pacemaker. Hyperinflation left lung.    POC US Chest B-Scan    Limited Bedside Lung Ultrasound, performed and interpreted by me. Indication: empyema and dyspnea With the patient positioned supine, right posterior and lateral lung fields were  examined for evidence of thoracic free fluid, pulmonary consolidation, and pulmonary edema. Findings: moderate right pleural effusion noted, mostly free flowing. Some debris noted (plankton sign) Chest tube visualized in the pleural effusion. Right lung atelectasis noted. No obvious loculations although scanning was limited due to presence of dressing. IMPRESSION: moderate to large right pleural effusion with chest tube in place. No obvious loculations noted on this limited bedside pleural/lung POCUS.      XR Chest Port 1 View    Result Date: 8/17/2024  EXAM: XR CHEST PORT 1 VIEW LOCATION: [facility] DATE: 8/17/2024 INDICATION: Recheck chest tube and pleural effusions COMPARISON: CT chest without contrast 08/16/2024, chest radiograph 08/15/2024     IMPRESSION: Stable position of the right basilar pigtail chest tube. Stable complete opacification of the right hemithorax with rib crowding compatible with large pleural effusion and associated collapse of the right lung. Right upper extremity PICC line  with tip overlying the mid aspect of the SVC. Cardiomediastinal silhouette is partially obscured by the above process however appears grossly stable. Aortic valve replacement. Stable position of the left chest pacemaker. Trace left pleural effusion. Streaky opacities at the left lung base favoring atelectasis. No pneumothorax. Unchanged osseous structures.    CT Chest w/o Contrast    Result Date: 8/16/2024  EXAM: CT CHEST W/O CONTRAST LOCATION: [facility] DATE: 8/16/2024 INDICATION: Recheck chest tube placement, clogged, etc. and recheck effusion given patient increase O2 and tachycardia COMPARISON: 8/13/2024 TECHNIQUE: CT chest without IV contrast. Multiplanar reformats were obtained. Dose reduction techniques were used. CONTRAST: None. FINDINGS: LUNGS AND PLEURA: Large right pleural effusion increased from previous. Complete collapse of the right lung. The right  mainstem, upper and lower lobe bronchi are essentially completely occluded increased from previous. Single right chest tube. Small left pleural effusion with passive atelectasis left lung base slightly increased from previous. MEDIASTINUM/AXILLAE: Transvenous pacemaker. Aortic valve prosthesis. Right PICC line tip in SVC. CORONARY ARTERY CALCIFICATION: Severe. UPPER ABDOMEN: No significant finding. MUSCULOSKELETAL: Unremarkable.     IMPRESSION: 1.  Large right pleural effusion increased from previous despite the presence of the chest tube. 2.  Complete collapse of the right lung with complete opacification of the right upper lobe, right lower lobe and right mainstem bronchus increased from previous. 3.  Small left pleural effusion increased from previous.     XR Chest 1 View    Result Date: 8/15/2024  EXAM: XR CHEST 1 VIEW LOCATION: [facility] DATE: 8/15/2024 INDICATION: Treated for empyema, s p chest tube , follow up pleural effusion COMPARISON: CT chest 8/13/2024, chest radiograph 8/11/2024     IMPRESSION: Unchanged position of the pigtail chest tube in the right lung base with similar complete opacification of the right hemithorax. Trace left effusion with dependent atelectasis. Background emphysema. Cardiac silhouette and mediastinal contours  are mostly obscured. Aortic valve replacement. Left chest cardiac generator and leads are unchanged. Right upper extremity PICC tip terminates in the mid SVC.    XR Video Swallow with SLP or OT    Result Date: 8/14/2024  EXAM: XR VIDEO SWALLOW WITH SLP OR OT LOCATION: [facility] DATE: 8/14/2024 INDICATION: Difficulty swallowing. COMPARISON: None. TECHNIQUE: Routine swallow study with speech pathology using multiple barium thicknesses. RADIATION DOSE: Dose Area Product 23.86 microGy-m2 FINDINGS: Swallow study with Speech Pathology using multiple barium thicknesses. Penetration with thin liquids, which slightly improved  with chin tuck maneuver and decreased swallow volumes. No definite aspiration visualized with trialed consistencies.     IMPRESSION: Penetration with thin liquids, however no aspiration visualized. Please see speech pathology report for further details and recommendations.    CT Chest w/o Contrast    Result Date: 8/14/2024  EXAM: CT CHEST W/O CONTRAST LOCATION: [facility] DATE: 8/13/2024 INDICATION: empyema s p chest tube, ?evacuated COMPARISON: 8/10/2024 TECHNIQUE: CT chest without IV contrast. Multiplanar reformats were obtained. Dose reduction techniques were used. CONTRAST: None. FINDINGS: LUNGS AND PLEURA: Right basilar pigtail pleural drain in place. Large right pleural effusion with complete atelectasis of the right lung. A few foci of gas in the pleural space. Small left pleural effusion. Emphysema. Mild frothy secretions in trachea. Debris occludes the central right lung airways. MEDIASTINUM/AXILLAE: Stable left subclavian approach pacemaker and TAVR. Right PICC with tip near the cavoatrial junction. Rightward mediastinal shift. No lymphadenopathy. CORONARY ARTERY CALCIFICATION: Moderate. UPPER ABDOMEN: Unchanged thickening of the left adrenal gland. MUSCULOSKELETAL: Unremarkable     IMPRESSION: 1.  Large right pleural effusion with right lung collapse.     US Abdomen Limited    Result Date: 8/12/2024  EXAM: US ABDOMEN LIMITED LOCATION: [facility] DATE: 8/12/2024 INDICATION: Abnormal LFTs. COMPARISON: CT 8/10/2024 TECHNIQUE: Limited abdominal ultrasound. FINDINGS: Study is limited by a chest tube and a compression dressing on the right side. Within limitations, findings are as follows. GALLBLADDER: Limited evaluation is within normal limits. No gallstones, wall thickening, or pericholecystic fluid. Negative sonographic Ospina's sign. BILE DUCTS: No biliary dilatation. The common duct measures 7 mm. LIVER: Normal parenchyma with smooth contour. No focal  mass. RIGHT KIDNEY: Not seen due to bowel gas. PANCREAS: The pancreas is largely obscured by overlying gas. No ascites.     IMPRESSION: 1.  Normal limited abdominal ultrasound within limitations detailed above.     Cardiac Device Check - Remote    Result Date: 8/12/2024  Encounter Type: alert remote pacemaker transmission for AT/AF >/=6hrs for 2 days. Device: [employer] [place]. Pacing % /Programmed: AP 43%,  <1% at AAIR<=>DDDR 60/130 ppm. Lead(s): stable. Battery longevity: 14yrs, 3mo estimated. Presenting: Atrial flutter with ventricular sensing  bpm. Atrial high rates: since 4/29/24; 24 mode switch episodes, AT/AF appears to be in progress since 7/21/24 2:45PM, burden <1%, v-rates >/=120bpm ~95%. 19 fast A&V episodes, available EGMs appear to be RVR during AF. Anticoagulant: Eliquis. Ventricular High rates: since 4/29/24; None detected. Comments: Normal device function. Plan: Routed to device RN for review. MICHELLE Padgett, Device Specialist ADD: Transmission reviewed, see EPIC for details. Zofia Hodges RN  Device follow up for the entirety of having the Pacemaker, based on best practices determined by [employer] and in Compliance with Medicare guidelines. Continue remote device monitoring per patient plan. I have reviewed and interpreted the device interrogation, settings, programming, and encounter summary. The device is functioning within normal device parameters. I agree with the current findings, assessment and plan.    XR Chest 1 View    Result Date: 8/11/2024  EXAM: XR CHEST 1 VIEW LOCATION: [facility] DATE: 08/11/2024 INDICATION: New chest tube placement. COMPARISON: 08/10/2024 CXR and CT chest.     IMPRESSION: Interval placement right basilar pleural drainage catheter. Previously seen large right pleural effusion has been nearly completely evacuated and the mid and lower right lung have partially reexpanded. No pneumothorax. Left lung clear. Mild cardiac enlargement.  Transcatheter aortic valve replacement. Pacer anterior left chest wall with lead tips in the RA and RV. Right PICC tip RA/SVC junction.     XR Chest Port 1 View    Result Date: 8/10/2024  EXAM: XR CHEST PORT 1 VIEW LOCATION: [facility] DATE: 8/10/2024 INDICATION: RN placed PICC   verify tip placement COMPARISON: 8/10/2024     IMPRESSION: New right PICC with tip near the cavoatrial junction. Otherwise, no significant interval change.    XR Chest Port 1 View    Result Date: 8/10/2024  EXAM: XR CHEST PORTABLE 1 VIEW LOCATION: [facility] DATE: 08/10/2024 INDICATION: Status post right pigtail chest tube. COMPARISON: 08/10/2024 at 1010 hours.     IMPRESSION: No change in dual-lead cardiac pacer or TAVR. Right-sided chest tube has been placed since comparison study with decrease in the previously seen large right pleural effusion. A moderate to large pleural effusion remains. There is likely overlying compressive atelectasis of the inferior portion of the right lung with concurrent infectious process not excluded. There is some indistinctness of the pulmonary interstitium involving the left lower lobe which is new from prior study and may reflect airway inflammation or edema.     CT Chest w Contrast    Result Date: 8/10/2024  EXAM: CT CHEST W CONTRAST LOCATION: [facility] DATE: 8/10/2024 INDICATION: SOB, large right-sided pleural effusion status post right pigtail COMPARISON: None. TECHNIQUE: CT chest with IV contrast. Multiplanar reformats were obtained. Dose reduction techniques were used. CONTRAST: 90 mL [id number] FINDINGS: LUNGS AND PLEURA: Large right effusion. Pigtail catheter is at the anterior right apex without significant fluid surrounding it. Extensive compressive atelectasis throughout the right lung. Left lung clear. MEDIASTINUM/AXILLAE: No lymphadenopathy. No thoracic aneurysm. CORONARY ARTERY CALCIFICATION: Moderate. UPPER  ABDOMEN: No acute findings. MUSCULOSKELETAL: No frankly destructive bony lesions.     IMPRESSION: 1.  Large right effusion and extensive compressive atelectasis versus less likely infiltrate in the right lung. 2.  The pigtail catheter tip is at the anterior apex, most of the fluid is at the base.        XR Chest Port 1 View    Result Date: 8/10/2024  EXAM: XR CHEST PORT 1 VIEW LOCATION: [facility] DATE: 8/10/2024 INDICATION: sob, hypoxic, history of prior ptx, eval for ptx, pna or edema COMPARISON: 7/17/2020     IMPRESSION: Large right pleural effusion. No pneumothorax. Left subclavian approach pacing device. Prosthetic aortic valve. Cardiac silhouette within normal limits. No acute osseous abnormality.    XR Chest Port 1 View    Result Date: 7/17/2024  EXAM: XR CHEST PORT 1 VIEW LOCATION: [facility] DATE: 7/17/2024 INDICATION: Recheck pneumothorax COMPARISON: Portable chest radiograph 07/16/2024     IMPRESSION: Stable position of left chest pacemaker. Prior aortic valve replacement. Slightly decreased size of right hilar pneumothorax measuring 8 mm currently, previously 15 mm. Small right pleural effusion with adjacent compressive atelectasis. Streaky opacities at the left lung base, favoring atelectasis. Stable mildly enlarged cardiomediastinal silhouette. Mild central pulmonary vascular congestion. Unchanged osseous structures.    XR Chest Port 1 View    Result Date: 7/16/2024  EXAM: XR CHEST PORT 1 VIEW LOCATION: [facility] DATE: 7/16/2024 INDICATION: Follow up PTX COMPARISON: July 15, 2024 2107 hours and other recent prior exams.     IMPRESSION: Cardiac pacing device is again observed along with mediastinal shift toward the right. There is a subpulmonic right pneumothorax which appears unchanged or slightly smaller than on x-ray yesterday. The left lung is clear and free of pneumothorax.    XR Chest Port 1 View    Result Date:  7/15/2024  EXAM: XR CHEST PORT 1 VIEW LOCATION: [facility] DATE: 7/15/2024 INDICATION: follow up pneumothorax COMPARISON: 7/15/2024 at 5:34 PM and 7/14/2024.     IMPRESSION: Small to moderate size right pneumothorax not significantly changed from the study at 5:34 PM today. Stable focal opacity at the right base which may be a small amount of residual pleural effusion, atelectasis and/or infiltrate. Mild shift of  mediastinal structures to the right as before. Stable mild cardiomegaly. Normal pulmonary vascularity. Left subclavian pacemaker unchanged.    XR Chest Port 1 View    Result Date: 7/15/2024  EXAM: XR CHEST PORT 1 VIEW LOCATION: [facility] DATE: 7/15/2024 INDICATION: Right upper lobe collapse. COMPARISON: 7/14/2024.     IMPRESSION: New, small to moderate-sized right pneumothorax, greatest at the peripheral right lung base. Previously large right pleural effusion is no longer present. Small amount of residual pleural fluid versus airspace disease is present at the right lung base. Continued imaging follow-up to resolution is recommended. No left pleural effusion or pneumothorax. Upper limits of normal heart size. Left chest wall cardiac implantable electronic device. Critical Result: Pneumothorax (new, increasing or tension) Finding was identified on 7/15/2024 5:55 PM CDT. Dr. Mendoza was contacted by me on 7/15/2024 6:25 PM CDT and verbalized understanding of the critical result.     US Thoracentesis    Result Date: 7/15/2024  EXAM: 1. RIGHT THORACENTESIS 2. ULTRASOUND GUIDANCE LOCATION: [facility] DATE: 7/15/2024 INDICATION: Pleural effusion. PROCEDURE: Informed consent obtained. Time out performed. The chest was prepped and draped in sterile fashion. 10 mL of 1 % lidocaine was infused into the local soft tissues. Under direct ultrasound guidance, a 5 Azeri catheter system was placed into the pleural effusion. 0.9 liters of  anders-colored fluid were removed and sent to lab, if requested. Patient tolerated procedure well. Ultrasound imaging was obtained and placed in the patient's permanent medical record.     IMPRESSION: Status post right ultrasound-guided thoracentesis. Reference CPT Code: 09323    XR Chest Port 1 View    Addendum Date: 7/14/2024    EXAM: XR CHEST PORT 1 VIEW LOCATION: [facility] DATE: 7/14/2024 INDICATION: Evaluate partial collapse COMPARISON: 7/13/2024 IMPRESSION: Increased atelectasis of the RIGHT lung with rightward shift of the mediastinum. Small right pleural effusion. Clear left lung. Left subclavian approach pacing device. Cardiac silhouette within normal limits. Prosthetic aortic valve.     Result Date: 7/14/2024  EXAM: XR CHEST PORT 1 VIEW LOCATION: [facility] DATE: 7/14/2024 INDICATION: Evaluate partial collapse COMPARISON: 7/13/2024     IMPRESSION: Increased atelectasis of the left lung with rightward shift of the mediastinum. Small right pleural effusion. Clear left lung. Left subclavian approach pacing device. Cardiac silhouette within normal limits. Prosthetic aortic valve.    XR Chest Port 1 View    Result Date: 7/13/2024  EXAM: XR CHEST PORT 1 VIEW LOCATION: [facility] DATE: 7/13/2024 INDICATION: eval for interval change in lung collapse COMPARISON: CT chest 7/12/2024     IMPRESSION: Similar moderate right pleural effusion with adjacent opacity favoring atelectasis. Given differences in technique and single AP view, right upper lung aeration appears improved. Left lung remains clear. Cardiomediastinal silhouette, left chest wall cardiac device and TAVR.    CT Chest Pulmonary Embolism w Contrast    Result Date: 7/12/2024  EXAM: CT CHEST PULMONARY EMBOLISM W CONTRAST LOCATION: [facility] DATE: 7/12/2024 INDICATION: covid positive, hypoxia, eval for pneumonia, PE COMPARISON: 2/6/2024 TECHNIQUE: CT chest  pulmonary angiogram during arterial phase injection of IV contrast. Multiplanar reformats and MIP reconstructions were performed. Dose reduction techniques were used. CONTRAST: 75 ML ISOVUE 370 FINDINGS: ANGIOGRAM CHEST: No pulmonary artery embolism. LUNGS AND PLEURA: Since February 2024, development of right upper lobe collapse with right upper lobe airway becoming completely opacified shortly after its origin. No significant change of middle and right lower lobe volume loss with patent middle and right lower lobe airways. Moderate right pleural effusion. No pneumothorax. MEDIASTINUM/AXILLAE: Compromised evaluation of right perihilar adenopathy from adjacent volume loss. No significant mediastinal or left perihilar adenopathy. Normal heart size. No pericardial effusion. Aortic valvular prosthetic. Dual-chamber pacer. Patulous esophagus. CORONARY ARTERY CALCIFICATION: Moderate. UPPER ABDOMEN: No actionable findings. MUSCULOSKELETAL: Bony demineralization and degenerative changes.     IMPRESSION: 1.  No pulmonary embolism. 2.  Interval complete right upper lobe collapse with opacification of the proximal right upper lobe airway of indeterminate etiology. Recommend pulmonary follow-up and direct visualization or repeat chest CT to exclude an obstructing lesion. Retained airway debris. 3.  No significant change of moderate right pleural effusion, of indeterminate etiology. 4.  Moderately advanced emphysema.

## 2024-08-23 NOTE — PROGRESS NOTES
"Care Management Follow Up    Length of Stay (days): 5    Expected Discharge Date: 08/26/2024     Concerns to be Addressed: Care progression - discharge planning     Patient plan of care discussed at interdisciplinary rounds: Yes    Anticipated Discharge Disposition:  Transitional care - [facility]     Anticipated Discharge Services:  Transitional care - [facility]  Anticipated Discharge DME:  NA    Patient/family educated on Medicare website which has current facility and service quality ratings:  Yes  Education Provided on the Discharge Plan:  Yes per team  Patient/Family in Agreement with the Plan:  Yes    Referrals Placed by CM/SW:  Yes  Private pay costs discussed: Not applicable    Additional Information:  Per provider, Dr. Willson, patient is not ready.     Called [facility] and spoke with Brianna to update.    Social Hx: \"Transferred from . Reside in a home of her son/daughter-in-law Cynthia, who is pt's PCA. Family assists w/I/ADL. Clinically accepted at [facility]. Need PAS. Family to transport.\"     RNCM to follow for medical progression, recommendations, and final discharge plan.     Ana Buitrago RN     "

## 2024-08-23 NOTE — PROVIDER NOTIFICATION
Pt with skin breakdown on sacrum & juanjo-area. Could you please order a: WOC consult & 2) Calmoseptine cream. Thanks!

## 2024-08-23 NOTE — PROGRESS NOTES
Pulmonary Progress Note  8/23/2024      Admit Date: 8/17/2024  CODE: Full Code    Reason for Consult: acute on chronic respiratory failure with hypoxemia. Right sided empyema and pneumonia.     Assessment/Plan:   74F w/ emphysema, chronic hypoxemic resp failure on home O2, presented with large right sided effusion and right sided mucus plugging with pneumonia. Did not drain well with chest tube and intrapleural lytics, with persistent opacification of the right hemithorax. Transferred to [facility] and underwent VATS with chest tube placement with general surgery.   Good chest tube output and the right lung is starting to re-expand. Now with small left sided effusion, free flowing on bedside POCUS, likely from hypervolemia. Otherwise she appears to be breathing comfortably and is on her baseline O2 requirement.    Recommendations:  - titrate FiO2 for goal SpO2 88-92%, avoid hyperoxia  - encourage OOB, PT/OT, push IS when able  - chest tube mgmt per general surgery  - continue nebs  - continue PTA ICS/LABA (breo in place of symbicort)  - finish oral prednisone course as ordered  - daily CXR while chest tube in place  - abx per ID  - increase furosemide to PTA 40mg daily.  - no indication for left sided thoracentesis as she is at her baseline O2 requirement and not having significant SOB.    We'll continue to follow.     Addy (Costa Gardiner MD  [facility] Pulmonary & Critical Care  Pager [phone]  Clinic [phone]  Fax [phone]     Subjective/Interim Events:   Feels OK  On baseline o2 requirement of 4Lpm.   Chest tube on right is uncomfortable  Coughing up some phlegm.    Chest tube output since placement (in mL)  8/17 790  8/18 1170  8/19 1480  8/20 415  8/21 300  8/22 260   8/23 - nothing charted so far.      Medications:     Current Facility-Administered Medications   Medication Dose Route Frequency Provider Last Rate Last Admin     Current Facility-Administered Medications    Medication Dose Route Frequency Provider Last Rate Last Admin    apixaban ANTICOAGULANT (ELIQUIS) tablet 5 mg  5 mg Oral BID Lauren Willson MD   5 mg at 08/23/24 0900    artificial saliva (BIOTENE MT) solution 1 spray  1 spray Mouth/Throat 4x Daily Renée Soriano MD   1 spray at 08/22/24 2058    atorvastatin (LIPITOR) tablet 20 mg  20 mg Oral At Bedtime Renée Soriano MD   20 mg at 08/22/24 2102    digoxin (LANOXIN) tablet 125 mcg  125 mcg Oral Daily Renée Soriano MD   125 mcg at 08/23/24 0942    diltiazem ER COATED BEADS (CARDIZEM CD/CARTIA XT) 24 hr capsule 120 mg  120 mg Oral Daily Renée Soriano MD   120 mg at 08/23/24 0900    fluconazole (DIFLUCAN) intermittent infusion 200 mg  200 mg Intravenous Q24H Renée Soriano  mL/hr at 08/22/24 1902 200 mg at 08/22/24 1902    fluticasone-vilanterol (BREO ELLIPTA) 200-25 MCG/ACT inhaler 1 puff  1 puff Inhalation Daily Renée Soriano MD   1 puff at 08/23/24 0859    furosemide (LASIX) tablet 20 mg  20 mg Oral Daily Lauren Willson MD   20 mg at 08/23/24 0900    gabapentin (NEURONTIN) capsule 600 mg  600 mg Oral TID Renée Soriano MD   600 mg at 08/23/24 0859    insulin aspart (NovoLOG) injection (RAPID ACTING)  1-10 Units Subcutaneous TID AC Renée Soriano MD   3 Units at 08/22/24 1641    insulin aspart (NovoLOG) injection (RAPID ACTING)  1-7 Units Subcutaneous At Bedtime Renée Soriano MD   4 Units at 08/22/24 2148    levalbuterol (XOPENEX) neb solution 1.25 mg  1.25 mg Nebulization 4x Daily Carmelita Buckley MD   1.25 mg at 08/23/24 0821    Lidocaine (LIDOCARE) 4 % Patch 1 patch  1 patch Transdermal Q24h Antonia Baptiste MD   1 patch at 08/22/24 2050    metoprolol tartrate (LOPRESSOR) tablet 50 mg  50 mg Oral BID Renée Soriano MD   50 mg at 08/23/24 0859    pantoprazole (PROTONIX) EC tablet 40 mg  40 mg Oral QAM AC Renée Soriano MD   40 mg at 08/23/24 0707    piperacillin-tazobactam (ZOSYN) 3.375 g vial to attach to  mL bag  3.375 g Intravenous  Q8H Antonia Baptiste MD   3.375 g at 08/23/24 0406    polyethylene glycol (MIRALAX) Packet 17 g  17 g Oral Daily Renée Soriano MD   17 g at 08/22/24 0903    predniSONE (DELTASONE) tablet 20 mg  20 mg Oral Daily Antonia Baptiste MD   20 mg at 08/23/24 0900    Followed by    [START ON 8/25/2024] predniSONE (DELTASONE) tablet 10 mg  10 mg Oral Daily Antonia Baptiste MD        senna-docusate (SENOKOT-S/PERICOLACE) 8.6-50 MG per tablet 1 tablet  1 tablet Oral BID Renée Soriano MD   1 tablet at 08/23/24 0859    sodium chloride (NEBUSAL) 3 % neb solution 3 mL  3 mL Nebulization 4x daily Carmelita Buckley MD   3 mL at 08/23/24 0820    sodium chloride (PF) 0.9% PF flush 10 mL  10 mL Intracatheter Q8H Lauren Willson MD   10 mL at 08/23/24 0901    sodium chloride (PF) 0.9% PF flush 10-40 mL  10-40 mL Intracatheter Q7 Days Renée Soriano MD   10 mL at 08/17/24 1703    sterile talc (STERITALC) powder for sclerosing 4 g  4 g INTRAPLEURAL Once Renée Soriano MD        sucralfate (CARAFATE) tablet 1 g  1 g Oral TID AC Renée Soriano MD   1 g at 08/23/24 0707         Exam/Data:   Vitals  /57   Pulse 82   Temp 97.6  F (36.4  C) (Oral)   Resp 18   Wt 69.6 kg (153 lb 7 oz)   LMP  (LMP Unknown)   SpO2 94%   BMI 25.53 kg/m       I/O last 3 completed shifts:  In: 800 [P.O.:800]  Out: 360 [Urine:100; Chest Tube:260]  Weight change:   [unfilled]  Resp: 18    EXAM:  Physical Exam  Gen: awake, alert, oriented, no distress  HEENT: NT, no AUGUST  CV: RRR, no m/g/r  Resp: diminished at bases. Chest tube site c/d/I. No wheezing.  Abd: soft, nontender, BS+  Skin: no rashes or lesions  Ext: no edema  Neuro: PERRL, nonfocal exam    ROS:  A 10-system review was obtained and is negative with the exception of the symptoms noted above.    DATA:    PFT DATA   None available    Micro  PCT wnl on 8/10  Viral swabs neg    Bronch/BAL 8/11  Culture + for e. Coli, pan-sensitive  1+ yeast, candida albicans  Total nuc cells 22, 65% PMN, 9% lymphs, 20%  lining cells  Cytology neg for malignancy     Pleural fluid 8/11  Culture + for strep pneumo, pan-sensitive  Total nuc cells 1451  73% PMN, 22% lymphs, 5% mono/mac  Glucose 159    Protein 3.3  TG 19  Cyto from 8/19 neg for malignancy  PJP pCR neg     Sputum 8/12  2+ strep pneumo  1+ e. Coli, pan-sensitive    IMAGING:   XR Chest Port 1 View    Result Date: 8/18/2024  EXAM: XR CHEST PORT 1 VIEW LOCATION: [facility] DATE: 8/18/2024 INDICATION: Recheck chest tube and pleural effusions COMPARISON: 8/17/2024     IMPRESSION: No change since yesterday. Small caliber chest tube projected at the right lung base unchanged. Complete opacification of the right hemithorax with volume loss unchanged. Right PICC line tip in low SVC. Transvenous pacemaker. Hyperinflation left lung.    POC US Chest B-Scan    Limited Bedside Lung Ultrasound, performed and interpreted by me. Indication: empyema and dyspnea With the patient positioned supine, right posterior and lateral lung fields were examined for evidence of thoracic free fluid, pulmonary consolidation, and pulmonary edema. Findings: moderate right pleural effusion noted, mostly free flowing. Some debris noted (plankton sign) Chest tube visualized in the pleural effusion. Right lung atelectasis noted. No obvious loculations although scanning was limited due to presence of dressing. IMPRESSION: moderate to large right pleural effusion with chest tube in place. No obvious loculations noted on this limited bedside pleural/lung POCUS.      XR Chest Port 1 View    Result Date: 8/17/2024  EXAM: XR CHEST PORT 1 VIEW LOCATION: [facility] DATE: 8/17/2024 INDICATION: Recheck chest tube and pleural effusions COMPARISON: CT chest without contrast 08/16/2024, chest radiograph 08/15/2024     IMPRESSION: Stable position of the right basilar pigtail chest tube. Stable complete opacification of the right hemithorax with rib crowding compatible  with large pleural effusion and associated collapse of the right lung. Right upper extremity PICC line  with tip overlying the mid aspect of the SVC. Cardiomediastinal silhouette is partially obscured by the above process however appears grossly stable. Aortic valve replacement. Stable position of the left chest pacemaker. Trace left pleural effusion. Streaky opacities at the left lung base favoring atelectasis. No pneumothorax. Unchanged osseous structures.     Left lung/pleural US 8/23

## 2024-08-23 NOTE — PROGRESS NOTES
RESPIRATORY CARE NOTE     Patient was on 4 lpm and had an Sp02 of 94%. They received Levabuterol and 3% x2.   Breath sounds prior to treatment where rhonchi upper right lobe and post treatment some increased in aeration.      Patient has a congested cough that is productive, patient reports green sputum    They have completed aerobika & IS therapy and demonstrated good technique.    RT will continue to assess and monitor cardiopulmonary status.     Sanam Membreno, RT

## 2024-08-23 NOTE — PROGRESS NOTES
[facility]    Medicine Progress Note - Hospitalist Service    Date of Admission:  8/17/2024    Assessment & Plan   74-year-old female with a past medical history of a flutter, COPD, ongoing tobacco abuse, previous pleural effusions and chronic pain admitted to the hospital with empyema; transferred from [facility] to [facility] for surgical management.     Empyema  --- s/p VATS - per surgery no true emphyema or rind found - no ability to expand lung  --- Repeat CT 08/20 shows pasty right pleural effusion s/p chest tube placement, moderate likely loculated/complicated residual as well as small associated right pneumothorax  Reexpansion of right upper lobe with persistent collapse of right middle and lower lobe segments.  Significant retained secretions greater in the right lower lobe. Increasing left pleural effusion  ---Plan was for bronchoscopy 08/21, not done as patient was requiring upto 10L oxygen, due to risk of being intubated  ---Started Xopenex, hypertonic saline. [place] - did not tolerate, Using flutter valve  ---Bronchoscopy in the future if improves with bronchial hygiene  --- having daily CXR   --- General surgery and pulmonology following  --- Continue chest tube to water seal, managed by surgery  --- first bronchoscopy 8/11 cultures showing E. coli and Candida albicans with pleural effusion cultures showing strep pneumo  --- ID followed at , changed antibiotics from ceftriaxone to Zosyn and fluconazole and recommended source control (likely IV Ceftriaxone at discharge)  --- seen by SLP, recommend regular/thin liquids     Acute on chronic respiratory failure  Advanced COPD  Tobacco dependence  --- Has been on 4-5 L as opposed to usual 3 L at home  --- Has been on oral taper of steroids for her COPD  --- prednisone taper  --- PTA ICS/LABA  --- Pulmonology following  --- encourage smoking cessation     Paroxysmal A-fib/a flutter; status post PPM  Aortic stenosis status  "post TAVR  Acute on chronic HFpEF  --- Previously  on Cardizem drip at   --- Currently on metoprolol and diltiazem and digoxin with hold parameters  --- She is off amiodarone due to QTc prolongation  --- Inc Lasix to 40mg home dose  --- On Eliquis  --- Cardiology signed off 8/15  --echo 4/2024 with EF 55-60%   --- monitor on telemetry   --- mag ok     Deconditioning   --- Previously recommended to go to TCU and has bed there.  ---PT/OT recommend TCU     Normocytic anemia; overall stable     Hyperlipidemia--statin ordered     Diabetes mellitus, type II  --- Not on home meds  --- Last A1c 6.2  --- Continue insulin sliding scale          Diet: Fluid restriction 1800 ML FLUID  Regular Diet Adult    DVT Prophylaxis: DOAC  Chairez Catheter: Not present  Lines: PRESENT      PICC 08/10/24 Triple Lumen Right Brachial vein medial-Site Assessment: WDL      Cardiac Monitoring: None  Code Status: Full Code      Clinically Significant Risk Factors              # Hypoalbuminemia: Lowest albumin = 2.2 g/dL at 8/18/2024  7:04 AM, will monitor as appropriate     # Hypertension: Noted on problem list           # Overweight: Estimated body mass index is 25.53 kg/m  as calculated from the following:    Height as of 8/10/24: 1.651 m (5' 5\").    Weight as of this encounter: 69.6 kg (153 lb 7 oz).      # Financial/Environmental Concerns: none   # Pacemaker present             Disposition Plan     Medically Ready for Discharge: Anticipated in 2-4 Days             Lauren Willson MD  Hospitalist Service  [facility]  Securely message with [employer]M (more info)  Text page via [employer] Paging/Directory   ______________________________________________________________________    Interval History   Patient was examined at the bedside, denies any new complaints. At baseline oxygen now.  Continues to have chest tube    Physical Exam   Vital Signs: Temp: 98  F (36.7  C) Temp src: Oral BP: 104/59 Pulse: 82   Resp: 18 SpO2: 92 % " (Forehead oxygen probe) O2 Device: Nasal cannula Oxygen Delivery: 4 LPM  Weight: 153 lbs 7.04 oz    General Appearance:  Pleasant frail, NAD, on 4 LPM NC  Respiratory: Relatively clear, mild rhonchi anteriorly.  Chest tube noted  Cardiovascular: irregular sounding today  GI: Soft and nontender without hepatosplenomegaly, masses palpable.  Skin: no significant lower extremity edema.  Frail skin with senile purpura and tattoos noted without open areas.  Other:  neuro grossly intact without focal deficits appreciated    Medical Decision Making       55 MINUTES SPENT BY ME on the date of service doing chart review, history, exam, documentation & further activities per the note.      Data     I have personally reviewed the following data over the past 24 hrs:    N/A  \   N/A   / N/A     140 104 18.1 /  252 (H)   3.5 31 (H) 0.55 \       Imaging results reviewed over the past 24 hrs:   Recent Results (from the past 24 hour(s))   XR Chest Port 1 View    Narrative    EXAM: XR CHEST PORT 1 VIEW  LOCATION: [facility]  DATE: 8/23/2024    INDICATION: Recheck chest tube and right lung s p VATS and placement of water seal done on 8 22 at 1220  COMPARISON: Yesterday      Impression    IMPRESSION: Postthoracotomy changes on the right with large bore chest tube. Right upper extremity PICC line catheter in good position. Dual-lead left subclavian venous pacer. TAVR.  Shallower inspiration., Likely little change in the small, loculated   hydropneumothorax in the right. Small right apical cap appears unchanged. Stable left pleural effusion with infiltrate or atelectasis in the left base.

## 2024-08-23 NOTE — PROGRESS NOTES
General Surgery Progress Note:    Hospital Day # 5    ASSESSMENT:  1. Empyema (H)    2. Atelectasis        Mary Kay Tejada is a 74 year old female with complicated PMH including HFpEF, COPD on 3L at home, A-fib on Eliquis, here with empyema most recently s/p VATS and 32 Fr chest tube 8/19 without full re-expansion of the right lung. Pulm planned repeat bronchoscopy (first done 8/11) 8/21 to further evaluate lung however patient with increased O2 at 10L in [place] and deferred procedure. Patient is no longer wishing for this procedure if there is higher risk of remaining intubated afterward. CXR with some improvement on the right upper lung since 8/22.  Please chest tube to waterseal 8/22, will continue with this until output is less than 100 cc/day and reassess.    ADDENDUM 1300: Changed chest tube dressing while patient in chair - was saturated with serous / serosanguinous fluid and foam tape covering surgical sites with skin changes due to excess moisture. Replaced with clean new dressing and foam tape avoiding surgical sites. Skin did not appear compromised or otherwise infected at my inspection at this time. At time of changing bandage and after new bandage, tidaling visualized in chest tube container but without bubbling and patient tolerated well.    PLAN:  -Diet as tolerated  -Continue aggressive pulmonary toileting per pulm  -Continue chest tube to water seal, please attempt at accurate output recordings documenting in chart. I marked start today in blue pen.   -Repeat daily chest x-ray while tube is in  -Medical management per primary team    SUBJECTIVE:   Mary Kay Tejada was seen on rounds.  Patient states that she is a little worried about how the tube is going to come out, is eager that to have it out but understands that we would like to keep it on until we have pressure that is not needed.  Patient states she has been coughing up a bunch of gunk this morning, more than yesterday.  She states she has  been using the pulmonary toileting tools.  Denies fever, chills, shortness of breath.  Does state that she was up in the chair yesterday and felt great but she feels very weak when getting up, occasionally has dizziness when sitting upright.  Otherwise no new great changes overnight      Patient Vitals for the past 24 hrs:   BP Temp Temp src Pulse Resp SpO2   08/23/24 0731 105/57 97.6  F (36.4  C) Oral 82 18 94 %   08/23/24 0100 -- -- -- -- -- 95 %   08/23/24 0037 112/60 97.4  F (36.3  C) Oral 78 17 90 %   08/22/24 2115 100/59 97.8  F (36.6  C) Oral 78 18 98 %   08/22/24 1534 -- -- -- 82 20 94 %   08/22/24 1523 102/59 97.4  F (36.3  C) Oral 81 20 94 %   08/22/24 1243 97/57 -- -- -- -- --   08/22/24 1142 -- -- -- 82 20 --   08/22/24 1100 101/55 -- -- 84 -- 92 %         PHYSICAL EXAM:  General: patient seen resting in bed in no acute distress, does have a productive cough  Resp: no increased work of breathing, breathing comfortably on 4 L of oxygen through nasal cannula  Abdomen: Soft and nontender  Drains: Chest tube is intact and draining, no visualized fluid in the tubing at this point other than residual clots.  No titling or bubbling visualized on exam.  Serosanguineous output throughout the collection container.  Remarked the collection container with a blue pen and black marker for a start collection time 8/23.  Extremities: No cyanosis visualized on exam, no obvious deformities    08/22 0700 - 08/23 0659  In: 800 [P.O.:800]  Out: 360 [Urine:100]    Admission on 08/17/2024   Component Date Value    pH Venous 08/17/2024 7.41     pCO2 Venous 08/17/2024 56 (H)     pO2 Venous 08/17/2024 37     Bicarbonate Venous 08/17/2024 36 (H)     Base Excess/Deficit Veno* 08/17/2024 10.9 (H)     FIO2 08/17/2024 32     Oxyhemoglobin Venous 08/17/2024 63 (L)     O2 Sat, Venous 08/17/2024 64.4 (L)     GLUCOSE BY METER POCT 08/17/2024 228 (H)     GLUCOSE BY METER POCT 08/17/2024 239 (H)     Sodium 08/18/2024 140     Potassium  08/18/2024 4.0     Chloride 08/18/2024 103     Carbon Dioxide (CO2) 08/18/2024 34 (H)     Anion Gap 08/18/2024 3 (L)     Urea Nitrogen 08/18/2024 14.4     Creatinine 08/18/2024 0.50 (L)     GFR Estimate 08/18/2024 >90     Calcium 08/18/2024 8.5 (L)     Glucose 08/18/2024 143 (H)     Magnesium 08/18/2024      Hemoglobin A1C 08/18/2024      GLUCOSE BY METER POCT 08/18/2024 134 (H)     Magnesium 08/18/2024 2.1     Protein Total 08/18/2024 5.2 (L)     Albumin 08/18/2024 2.2 (L)     Bilirubin Total 08/18/2024 0.4     Alkaline Phosphatase 08/18/2024 105     AST 08/18/2024 20     ALT 08/18/2024 31     Bilirubin Direct 08/18/2024 <0.20     GLUCOSE BY METER POCT 08/18/2024 189 (H)     GLUCOSE BY METER POCT 08/18/2024 324 (H)     GLUCOSE BY METER POCT 08/18/2024 296 (H)     Sodium 08/19/2024 144     Potassium 08/19/2024 4.2     Chloride 08/19/2024 107     Carbon Dioxide (CO2) 08/19/2024 31 (H)     Anion Gap 08/19/2024 6 (L)     Urea Nitrogen 08/19/2024 17.6     Creatinine 08/19/2024 0.55     GFR Estimate 08/19/2024 >90     Calcium 08/19/2024 8.3 (L)     Glucose 08/19/2024 127 (H)     Magnesium 08/19/2024 2.2     WBC Count 08/19/2024 10.4     RBC Count 08/19/2024 4.26     Hemoglobin 08/19/2024 10.2 (L)     Hematocrit 08/19/2024 36.8     MCV 08/19/2024 86     MCH 08/19/2024 23.9 (L)     MCHC 08/19/2024 27.7 (L)     RDW 08/19/2024 17.5 (H)     Platelet Count 08/19/2024 274     GLUCOSE BY METER POCT 08/19/2024 126 (H)     GLUCOSE BY METER POCT 08/19/2024 138 (H)     ABO/RH(D) 08/19/2024 O POS     Antibody Screen 08/19/2024 Negative     SPECIMEN EXPIRATION DATE 08/19/2024 [id number]     Final Diagnosis 08/19/2024                      Value:This result contains rich text formatting which cannot be displayed here.    Gross Description 08/19/2024                      Value:This result contains rich text formatting which cannot be displayed here.    Microscopic Description 08/19/2024                      Value:This result contains  rich text formatting which cannot be displayed here.    Performing Labs 08/19/2024                      Value:This result contains rich text formatting which cannot be displayed here.    GLUCOSE BY METER POCT 08/19/2024 159 (H)     GLUCOSE BY METER POCT 08/19/2024 199 (H)     GLUCOSE BY METER POCT 08/19/2024 150 (H)     Sodium 08/20/2024 143     Potassium 08/20/2024 4.5     Chloride 08/20/2024 106     Carbon Dioxide (CO2) 08/20/2024 32 (H)     Anion Gap 08/20/2024 5 (L)     Urea Nitrogen 08/20/2024 15.9     Creatinine 08/20/2024 0.51     GFR Estimate 08/20/2024 >90     Calcium 08/20/2024 8.1 (L)     Glucose 08/20/2024 138 (H)     Magnesium 08/20/2024 1.9     WBC Count 08/20/2024 11.9 (H)     RBC Count 08/20/2024 4.12     Hemoglobin 08/20/2024 10.0 (L)     Hematocrit 08/20/2024 35.8     MCV 08/20/2024 87     MCH 08/20/2024 24.3 (L)     MCHC 08/20/2024 27.9 (L)     RDW 08/20/2024 17.7 (H)     Platelet Count 08/20/2024 271     GLUCOSE BY METER POCT 08/20/2024 139 (H)     GLUCOSE BY METER POCT 08/20/2024 135 (H)     GLUCOSE BY METER POCT 08/20/2024 128 (H)     GLUCOSE BY METER POCT 08/20/2024 112 (H)     Sodium 08/21/2024 144     Potassium 08/21/2024 4.0     Chloride 08/21/2024 106     Carbon Dioxide (CO2) 08/21/2024 30 (H)     Anion Gap 08/21/2024 8     Urea Nitrogen 08/21/2024 16.0     Creatinine 08/21/2024 0.49 (L)     GFR Estimate 08/21/2024 >90     Calcium 08/21/2024 8.4 (L)     Glucose 08/21/2024 78     WBC Count 08/21/2024 12.4 (H)     RBC Count 08/21/2024 4.17     Hemoglobin 08/21/2024 10.1 (L)     Hematocrit 08/21/2024 36.1     MCV 08/21/2024 87     MCH 08/21/2024 24.2 (L)     MCHC 08/21/2024 28.0 (L)     RDW 08/21/2024 18.1 (H)     Platelet Count 08/21/2024 273     Ventricular Rate 08/21/2024 83     Atrial Rate 08/21/2024 83     NE Interval 08/21/2024 210     QRS Duration 08/21/2024 92     QT 08/21/2024 336     QTc 08/21/2024 394     P Axis 08/21/2024 85     R AXIS 08/21/2024 -57     T Axis 08/21/2024 103      Interpretation ECG 08/21/2024                      Value:Atrial flutter  Left axis deviation  Pulmonary disease pattern  Nonspecific ST and T wave abnormality  Abnormal ECG  When compared with ECG of 12-Aug-2024 10:57,  Vent. rate has decreased by  56 bpm  ST now depressed in Inferior leads  Confirmed by SHOSHANA GARCIA MD LOC:JN ([id number]) on 8/21/2024 8:17:11 AM      Magnesium 08/21/2024 2.1     GLUCOSE BY METER POCT 08/21/2024 71     GLUCOSE BY METER POCT 08/21/2024 84     GLUCOSE BY METER POCT 08/21/2024 102 (H)     GLUCOSE BY METER POCT 08/21/2024 179 (H)     Sodium 08/22/2024 142     Potassium 08/22/2024 4.0     Chloride 08/22/2024 106     Carbon Dioxide (CO2) 08/22/2024 30 (H)     Anion Gap 08/22/2024 6 (L)     Urea Nitrogen 08/22/2024 22.4     Creatinine 08/22/2024 0.56     GFR Estimate 08/22/2024 >90     Calcium 08/22/2024 8.4 (L)     Glucose 08/22/2024 114 (H)     Magnesium 08/22/2024 2.0     WBC Count 08/22/2024 10.7     RBC Count 08/22/2024 3.97     Hemoglobin 08/22/2024 9.7 (L)     Hematocrit 08/22/2024 34.1 (L)     MCV 08/22/2024 86     MCH 08/22/2024 24.4 (L)     MCHC 08/22/2024 28.4 (L)     RDW 08/22/2024 17.9 (H)     Platelet Count 08/22/2024 272     % Neutrophils 08/22/2024 83     % Lymphocytes 08/22/2024 8     % Monocytes 08/22/2024 7     % Eosinophils 08/22/2024 0     % Basophils 08/22/2024 0     % Immature Granulocytes 08/22/2024 1     NRBCs per 100 WBC 08/22/2024 0     Absolute Neutrophils 08/22/2024 9.0 (H)     Absolute Lymphocytes 08/22/2024 0.9     Absolute Monocytes 08/22/2024 0.8     Absolute Eosinophils 08/22/2024 0.0     Absolute Basophils 08/22/2024 0.0     Absolute Immature Granul* 08/22/2024 0.1     Absolute NRBCs 08/22/2024 0.0     GLUCOSE BY METER POCT 08/22/2024 104 (H)     GLUCOSE BY METER POCT 08/22/2024 205 (H)     GLUCOSE BY METER POCT 08/22/2024 192 (H)     GLUCOSE BY METER POCT 08/22/2024 298 (H)     Sodium 08/23/2024 140     Potassium 08/23/2024 3.5     Chloride 08/23/2024 104      Carbon Dioxide (CO2) 08/23/2024 31 (H)     Anion Gap 08/23/2024 5 (L)     Urea Nitrogen 08/23/2024 18.1     Creatinine 08/23/2024 0.55     GFR Estimate 08/23/2024 >90     Calcium 08/23/2024 8.2 (L)     Glucose 08/23/2024 119 (H)     Magnesium 08/23/2024 1.8     GLUCOSE BY METER POCT 08/23/2024 114 (H)         APRIL Salmeron [facility] Surgery  [address] [place] [address] [phone]

## 2024-08-23 NOTE — PLAN OF CARE
Problem: Adult Inpatient Plan of Care  Goal: Absence of Hospital-Acquired Illness or Injury  Intervention: Prevent and Manage VTE (Venous Thromboembolism) Risk  Recent Flowsheet Documentation  Taken 8/22/2024 1945 by Erika Tapia RN  VTE Prevention/Management: SCDs on (sequential compression devices)  Intervention: Prevent Infection  Recent Flowsheet Documentation  Taken 8/22/2024 1945 by Erika Tapia RN  Infection Prevention: hand hygiene promoted  Goal: Optimal Comfort and Wellbeing  Intervention: Provide Person-Centered Care  Recent Flowsheet Documentation  Taken 8/22/2024 1945 by Erika Tapia RN  Trust Relationship/Rapport:   care explained   questions answered   questions encouraged   reassurance provided   thoughts/feelings acknowledged     Problem: Gas Exchange Impaired  Goal: Optimal Gas Exchange  Outcome: Progressing     Problem: Dysrhythmia  Goal: Normalized Cardiac Rhythm  Intervention: Monitor and Manage Cardiac Rhythm Effect  Recent Flowsheet Documentation  Taken 8/22/2024 1945 by Erika Tapia RN  VTE Prevention/Management: SCDs on (sequential compression devices)     Problem: Comorbidity Management  Goal: Maintenance of COPD Symptom Control  Outcome: Progressing  Goal: Blood Glucose Levels Within Targeted Range  Outcome: Progressing  Goal: Blood Pressure in Desired Range  Outcome: Progressing     Problem: Risk for Delirium  Goal: Improved Behavioral Control  Intervention: Minimize Safety Risk  Recent Flowsheet Documentation  Taken 8/22/2024 1945 by Erika Tapia RN  Trust Relationship/Rapport:   care explained   questions answered   questions encouraged   reassurance provided   thoughts/feelings acknowledged   Goal Outcome Evaluation:  Chest tube patent on water seal.  Pain 8/10 at chest tube site partially relieved with PRN Tylenol and Lidocaine patch.  She declined stronger pain med.  She voided on bedpan x1.  Glucose 298 at hs.  She has dark blue bruising over arms.  She is very weak and admits  to dyspnea on exertion with O2 on 5 LPM per NC.  O2 sats in high 9's.  Metoprolol was held until BP higher.

## 2024-08-24 ENCOUNTER — APPOINTMENT (OUTPATIENT)
Dept: RADIOLOGY | Facility: HOSPITAL | Age: 74
DRG: 166 | End: 2024-08-24
Payer: COMMERCIAL

## 2024-08-24 DIAGNOSIS — J86.9 EMPYEMA (H): Primary | ICD-10-CM

## 2024-08-24 LAB
ANION GAP SERPL CALCULATED.3IONS-SCNC: 7 MMOL/L (ref 7–15)
BUN SERPL-MCNC: 15.4 MG/DL (ref 8–23)
CALCIUM SERPL-MCNC: 8.4 MG/DL (ref 8.8–10.4)
CHLORIDE SERPL-SCNC: 104 MMOL/L (ref 98–107)
CREAT SERPL-MCNC: 0.49 MG/DL (ref 0.51–0.95)
EGFRCR SERPLBLD CKD-EPI 2021: >90 ML/MIN/1.73M2
GLUCOSE BLDC GLUCOMTR-MCNC: 175 MG/DL (ref 70–99)
GLUCOSE BLDC GLUCOMTR-MCNC: 178 MG/DL (ref 70–99)
GLUCOSE BLDC GLUCOMTR-MCNC: 301 MG/DL (ref 70–99)
GLUCOSE BLDC GLUCOMTR-MCNC: 94 MG/DL (ref 70–99)
GLUCOSE SERPL-MCNC: 104 MG/DL (ref 70–99)
HCO3 SERPL-SCNC: 31 MMOL/L (ref 22–29)
MAGNESIUM SERPL-MCNC: 1.8 MG/DL (ref 1.7–2.3)
POTASSIUM SERPL-SCNC: 3.7 MMOL/L (ref 3.4–5.3)
SODIUM SERPL-SCNC: 142 MMOL/L (ref 135–145)

## 2024-08-24 PROCEDURE — 120N000013 HC R&B IMCU

## 2024-08-24 PROCEDURE — 71045 X-RAY EXAM CHEST 1 VIEW: CPT

## 2024-08-24 PROCEDURE — 250N000013 HC RX MED GY IP 250 OP 250 PS 637: Performed by: STUDENT IN AN ORGANIZED HEALTH CARE EDUCATION/TRAINING PROGRAM

## 2024-08-24 PROCEDURE — 250N000011 HC RX IP 250 OP 636: Performed by: FAMILY MEDICINE

## 2024-08-24 PROCEDURE — 250N000013 HC RX MED GY IP 250 OP 250 PS 637: Performed by: SPECIALIST

## 2024-08-24 PROCEDURE — 99233 SBSQ HOSP IP/OBS HIGH 50: CPT | Performed by: STUDENT IN AN ORGANIZED HEALTH CARE EDUCATION/TRAINING PROGRAM

## 2024-08-24 PROCEDURE — 250N000011 HC RX IP 250 OP 636: Performed by: SPECIALIST

## 2024-08-24 PROCEDURE — 99024 POSTOP FOLLOW-UP VISIT: CPT | Performed by: PHYSICIAN ASSISTANT

## 2024-08-24 PROCEDURE — 999N000157 HC STATISTIC RCP TIME EA 10 MIN

## 2024-08-24 PROCEDURE — 250N000012 HC RX MED GY IP 250 OP 636 PS 637: Performed by: FAMILY MEDICINE

## 2024-08-24 PROCEDURE — 94640 AIRWAY INHALATION TREATMENT: CPT

## 2024-08-24 PROCEDURE — 80048 BASIC METABOLIC PNL TOTAL CA: CPT | Performed by: SPECIALIST

## 2024-08-24 PROCEDURE — 250N000013 HC RX MED GY IP 250 OP 250 PS 637: Performed by: FAMILY MEDICINE

## 2024-08-24 PROCEDURE — 250N000013 HC RX MED GY IP 250 OP 250 PS 637: Performed by: INTERNAL MEDICINE

## 2024-08-24 PROCEDURE — 250N000009 HC RX 250: Performed by: INTERNAL MEDICINE

## 2024-08-24 PROCEDURE — 83735 ASSAY OF MAGNESIUM: CPT | Performed by: INTERNAL MEDICINE

## 2024-08-24 PROCEDURE — 99232 SBSQ HOSP IP/OBS MODERATE 35: CPT | Performed by: INTERNAL MEDICINE

## 2024-08-24 PROCEDURE — 94640 AIRWAY INHALATION TREATMENT: CPT | Mod: 76

## 2024-08-24 PROCEDURE — 82310 ASSAY OF CALCIUM: CPT | Performed by: SPECIALIST

## 2024-08-24 RX ORDER — LEVALBUTEROL INHALATION SOLUTION 1.25 MG/3ML
1.25 SOLUTION RESPIRATORY (INHALATION) 4 TIMES DAILY
Status: DISCONTINUED | OUTPATIENT
Start: 2024-08-24 | End: 2024-08-25

## 2024-08-24 RX ADMIN — ACETAMINOPHEN 650 MG: 325 TABLET ORAL at 10:54

## 2024-08-24 RX ADMIN — ACETAMINOPHEN 650 MG: 325 TABLET ORAL at 06:30

## 2024-08-24 RX ADMIN — DILTIAZEM HYDROCHLORIDE 120 MG: 120 CAPSULE, EXTENDED RELEASE ORAL at 08:03

## 2024-08-24 RX ADMIN — LEVALBUTEROL HYDROCHLORIDE 1.25 MG: 1.25 SOLUTION RESPIRATORY (INHALATION) at 20:17

## 2024-08-24 RX ADMIN — FLUTICASONE FUROATE AND VILANTEROL TRIFENATATE 1 PUFF: 200; 25 POWDER RESPIRATORY (INHALATION) at 08:02

## 2024-08-24 RX ADMIN — PANTOPRAZOLE SODIUM 40 MG: 40 TABLET, DELAYED RELEASE ORAL at 06:30

## 2024-08-24 RX ADMIN — GABAPENTIN 600 MG: 300 CAPSULE ORAL at 08:00

## 2024-08-24 RX ADMIN — ATORVASTATIN CALCIUM 20 MG: 10 TABLET, FILM COATED ORAL at 21:59

## 2024-08-24 RX ADMIN — LEVALBUTEROL HYDROCHLORIDE 1.25 MG: 1.25 SOLUTION RESPIRATORY (INHALATION) at 15:30

## 2024-08-24 RX ADMIN — SODIUM CHLORIDE SOLN NEBU 3% 3 ML: 3 NEBU SOLN at 08:31

## 2024-08-24 RX ADMIN — PREDNISONE 20 MG: 20 TABLET ORAL at 08:01

## 2024-08-24 RX ADMIN — GABAPENTIN 600 MG: 300 CAPSULE ORAL at 21:59

## 2024-08-24 RX ADMIN — ACETAMINOPHEN 650 MG: 325 TABLET ORAL at 19:44

## 2024-08-24 RX ADMIN — PIPERACILLIN AND TAZOBACTAM 3.38 G: 3; .375 INJECTION, POWDER, FOR SOLUTION INTRAVENOUS at 11:26

## 2024-08-24 RX ADMIN — SODIUM CHLORIDE SOLN NEBU 3% 3 ML: 3 NEBU SOLN at 15:30

## 2024-08-24 RX ADMIN — GABAPENTIN 600 MG: 300 CAPSULE ORAL at 13:58

## 2024-08-24 RX ADMIN — ANORECTAL OINTMENT: 15.7; .44; 24; 20.6 OINTMENT TOPICAL at 08:10

## 2024-08-24 RX ADMIN — FLUCONAZOLE 200 MG: 2 INJECTION, SOLUTION INTRAVENOUS at 19:19

## 2024-08-24 RX ADMIN — IPRATROPIUM BROMIDE 0.5 MG: 0.5 SOLUTION RESPIRATORY (INHALATION) at 20:17

## 2024-08-24 RX ADMIN — PIPERACILLIN AND TAZOBACTAM 3.38 G: 3; .375 INJECTION, POWDER, FOR SOLUTION INTRAVENOUS at 19:47

## 2024-08-24 RX ADMIN — SUCRALFATE 1 G: 1 TABLET ORAL at 11:26

## 2024-08-24 RX ADMIN — SUCRALFATE 1 G: 1 TABLET ORAL at 08:00

## 2024-08-24 RX ADMIN — FUROSEMIDE 40 MG: 20 TABLET ORAL at 08:01

## 2024-08-24 RX ADMIN — APIXABAN 5 MG: 5 TABLET, FILM COATED ORAL at 08:01

## 2024-08-24 RX ADMIN — SENNOSIDES AND DOCUSATE SODIUM 1 TABLET: 8.6; 5 TABLET ORAL at 21:59

## 2024-08-24 RX ADMIN — LEVALBUTEROL HYDROCHLORIDE 1.25 MG: 1.25 SOLUTION RESPIRATORY (INHALATION) at 08:30

## 2024-08-24 RX ADMIN — SUCRALFATE 1 G: 1 TABLET ORAL at 17:00

## 2024-08-24 RX ADMIN — SODIUM CHLORIDE SOLN NEBU 3% 3 ML: 3 NEBU SOLN at 20:17

## 2024-08-24 RX ADMIN — LIDOCAINE 1 PATCH: 4 PATCH TOPICAL at 19:46

## 2024-08-24 RX ADMIN — DIGOXIN 125 MCG: 125 TABLET ORAL at 08:03

## 2024-08-24 RX ADMIN — METOPROLOL TARTRATE 50 MG: 25 TABLET, FILM COATED ORAL at 19:45

## 2024-08-24 RX ADMIN — Medication 1 SPRAY: at 08:01

## 2024-08-24 RX ADMIN — APIXABAN 5 MG: 5 TABLET, FILM COATED ORAL at 21:59

## 2024-08-24 RX ADMIN — PIPERACILLIN AND TAZOBACTAM 3.38 G: 3; .375 INJECTION, POWDER, FOR SOLUTION INTRAVENOUS at 05:13

## 2024-08-24 ASSESSMENT — ACTIVITIES OF DAILY LIVING (ADL)
ADLS_ACUITY_SCORE: 49
ADLS_ACUITY_SCORE: 44
ADLS_ACUITY_SCORE: 49
ADLS_ACUITY_SCORE: 44
ADLS_ACUITY_SCORE: 44
ADLS_ACUITY_SCORE: 49
ADLS_ACUITY_SCORE: 44
ADLS_ACUITY_SCORE: 49
ADLS_ACUITY_SCORE: 44
ADLS_ACUITY_SCORE: 44
ADLS_ACUITY_SCORE: 49
ADLS_ACUITY_SCORE: 44
ADLS_ACUITY_SCORE: 49
ADLS_ACUITY_SCORE: 44
ADLS_ACUITY_SCORE: 44
ADLS_ACUITY_SCORE: 49

## 2024-08-24 NOTE — PROGRESS NOTES
Pulmonary Progress Note  8/23/2024      Admit Date: 8/17/2024  CODE: Full Code    Reason for Consult: acute on chronic respiratory failure with hypoxemia, right empyema and pneumonia.    Assessment/Plan:   74F w/ active tobacco dependence, emphysema and presumed COPD with no formal PFTs, chronic hypoxemic resp failure on home O2, DM, fibromyalgia, moderate aortic stenosis, HFpEF, presented with large right sided effusion and right sided mucus plugging with Strep pneumo and E coli pneumonia, did not drain well with chest tube and intrapleural lytics, with persistent opacification of the right hemithorax, transferred to [facility] and underwent right VATS with chest tube placement on 8/19    Right empyema, right basilar pneumonia, acute on chronic respiratory failure with hypoxia: Pneumococcal and E coli pneumonia with associated right empyema, failed conservative management, s/p VATS with right chest tube. Good chest tube output, being followed by surgery. On low-flow oxygen, 4 L/min. States that she is finally ready to quit smoking.    Plan:  - titrate FiO2 for goal SpO2 88-92%, avoid hyperoxia, has home oxygen  - scheduled nebulized levalbuterol with flutter valve, will add ipratropium  - encourage OOB, PT/OT, push IS  - chest tube mgmt per surgery  - continue nebs with flutter valve  - continue PTA ICS/LABA (breo in place of symbicort)  - finish oral prednisone course as ordered  - daily CXR while chest tube in place  - abx per ID  - continue diuresis  - will schedule chest CT prior to visit in pulmonary clinic scheduled for 9/25  - will follow    Addy Gardiner MD (Avi)  [facility]monary [facility]  Pager [phone]  Clinic [phone]  Fax [phone]     Subjective/Interim Events:   Breathing feels stable, able to cough up green sputum after nebs/flutter valve    Chest tube output since placement (in mL)  8/17 790  8/18 1170  8/19 1480  8/20 415  8/21 300  8/22 260   8/23 1440  (?)      Medications:     Current Facility-Administered Medications   Medication Dose Route Frequency Provider Last Rate Last Admin     Current Facility-Administered Medications   Medication Dose Route Frequency Provider Last Rate Last Admin    apixaban ANTICOAGULANT (ELIQUIS) tablet 5 mg  5 mg Oral BID Lauren Willson MD   5 mg at 08/24/24 0801    artificial saliva (BIOTENE MT) solution 1 spray  1 spray Mouth/Throat 4x Daily Renée Soriano MD   1 spray at 08/24/24 0801    atorvastatin (LIPITOR) tablet 20 mg  20 mg Oral At Bedtime Renée Soriano MD   20 mg at 08/23/24 2050    digoxin (LANOXIN) tablet 125 mcg  125 mcg Oral Daily Renée Soriano MD   125 mcg at 08/24/24 0803    diltiazem ER COATED BEADS (CARDIZEM CD/CARTIA XT) 24 hr capsule 120 mg  120 mg Oral Daily Renée Soriano MD   120 mg at 08/24/24 0803    fluconazole (DIFLUCAN) intermittent infusion 200 mg  200 mg Intravenous Q24H Renée Soriano  mL/hr at 08/23/24 1907 200 mg at 08/23/24 1907    fluticasone-vilanterol (BREO ELLIPTA) 200-25 MCG/ACT inhaler 1 puff  1 puff Inhalation Daily Renée Soriano MD   1 puff at 08/24/24 0802    furosemide (LASIX) tablet 40 mg  40 mg Oral Daily Addy Gardiner MD   40 mg at 08/24/24 0801    gabapentin (NEURONTIN) capsule 600 mg  600 mg Oral TID Renée Soriano MD   600 mg at 08/24/24 1358    insulin aspart (NovoLOG) injection (RAPID ACTING)  1-10 Units Subcutaneous TID AC Renée Soriano MD   2 Units at 08/24/24 1244    insulin aspart (NovoLOG) injection (RAPID ACTING)  1-7 Units Subcutaneous At Bedtime Renée Soriano MD   3 Units at 08/23/24 2052    levalbuterol (XOPENEX) neb solution 1.25 mg  1.25 mg Nebulization 4x Daily Carmelita Buckley MD   1.25 mg at 08/24/24 1530    Lidocaine (LIDOCARE) 4 % Patch 1 patch  1 patch Transdermal Q24h Antonia Baptiste MD   1 patch at 08/23/24 2050    metoprolol tartrate (LOPRESSOR) tablet 50 mg  50 mg Oral BID Renée Soriano MD   50 mg at 08/23/24 2050    pantoprazole (PROTONIX)  EC tablet 40 mg  40 mg Oral QAM AC Renée Soriano MD   40 mg at 08/24/24 0630    piperacillin-tazobactam (ZOSYN) 3.375 g vial to attach to  mL bag  3.375 g Intravenous Q8H Antonia Baptiste MD   3.375 g at 08/24/24 1126    polyethylene glycol (MIRALAX) Packet 17 g  17 g Oral Daily Renée Soriano MD   17 g at 08/22/24 0903    [START ON 8/25/2024] predniSONE (DELTASONE) tablet 10 mg  10 mg Oral Daily Antonia Baptiste MD        senna-docusate (SENOKOT-S/PERICOLACE) 8.6-50 MG per tablet 1 tablet  1 tablet Oral BID Renée Soriano MD   1 tablet at 08/23/24 0859    sodium chloride (NEBUSAL) 3 % neb solution 3 mL  3 mL Nebulization 4x daily Carmelita Buckley MD   3 mL at 08/24/24 1530    sodium chloride (PF) 0.9% PF flush 10 mL  10 mL Intracatheter Q8H Lauren Willson MD   10 mL at 08/24/24 0805    sodium chloride (PF) 0.9% PF flush 10-40 mL  10-40 mL Intracatheter Q7 Days Renée Soriano MD   10 mL at 08/17/24 1703    sterile talc (STERITALC) powder for sclerosing 4 g  4 g INTRAPLEURAL Once Renée Soriano MD        sucralfate (CARAFATE) tablet 1 g  1 g Oral TID AC Renée Soriano MD   1 g at 08/24/24 1126         Exam/Data:   Vitals  /59 (BP Location: Left arm)   Pulse 89   Temp 97.6  F (36.4  C) (Oral)   Resp 18   Wt 69.6 kg (153 lb 7 oz)   LMP  (LMP Unknown)   SpO2 92%   BMI 25.53 kg/m       I/O last 3 completed shifts:  In: 560 [P.O.:550; I.V.:10]  Out: 2350 [Urine:2350]  Weight change:   [unfilled]  Resp: 18    EXAM:  Physical Exam  Gen: awake, alert, oriented, no distress  HEENT: NT, no AUGUST, raspy voice  CV: RRR, no m/g/r  Resp: diminished at right base. Chest tube site c/d/I. No wheezing.  Abd: soft, nontender, BS+  Skin: no rashes or lesions  Ext: no edema  Neuro: PERRL, nonfocal exam    ROS:  A 10-system review was obtained and is negative with the exception of the symptoms noted above.    DATA:    PFT DATA   None available    Micro  PCT wnl on 8/10  Viral swabs neg    Bronch/BAL 8/11  Culture + for  e. Coli, pan-sensitive  1+ yeast, candida albicans  Total nuc cells 22, 65% PMN, 9% lymphs, 20% lining cells  Cytology neg for malignancy     Pleural fluid 8/11  Culture + for strep pneumo, pan-sensitive  Total nuc cells 1451  73% PMN, 22% lymphs, 5% mono/mac  Glucose 159    Protein 3.3  TG 19  Cyto from 8/19 neg for malignancy  PJP pCR neg     Sputum 8/12  2+ strep pneumo  1+ e. Coli, pan-sensitive    IMAGING:   XR Chest Port 1 View    Result Date: 8/18/2024  EXAM: XR CHEST PORT 1 VIEW LOCATION: [facility] DATE: 8/18/2024 INDICATION: Recheck chest tube and pleural effusions COMPARISON: 8/17/2024     IMPRESSION: No change since yesterday. Small caliber chest tube projected at the right lung base unchanged. Complete opacification of the right hemithorax with volume loss unchanged. Right PICC line tip in low SVC. Transvenous pacemaker. Hyperinflation left lung.    POC US Chest B-Scan    Limited Bedside Lung Ultrasound, performed and interpreted by me. Indication: empyema and dyspnea With the patient positioned supine, right posterior and lateral lung fields were examined for evidence of thoracic free fluid, pulmonary consolidation, and pulmonary edema. Findings: moderate right pleural effusion noted, mostly free flowing. Some debris noted (plankton sign) Chest tube visualized in the pleural effusion. Right lung atelectasis noted. No obvious loculations although scanning was limited due to presence of dressing. IMPRESSION: moderate to large right pleural effusion with chest tube in place. No obvious loculations noted on this limited bedside pleural/lung POCUS.      XR Chest Port 1 View    Result Date: 8/17/2024  EXAM: XR CHEST PORT 1 VIEW LOCATION: [facility] DATE: 8/17/2024 INDICATION: Recheck chest tube and pleural effusions COMPARISON: CT chest without contrast 08/16/2024, chest radiograph 08/15/2024     IMPRESSION: Stable position of the right basilar  pigtail chest tube. Stable complete opacification of the right hemithorax with rib crowding compatible with large pleural effusion and associated collapse of the right lung. Right upper extremity PICC line  with tip overlying the mid aspect of the SVC. Cardiomediastinal silhouette is partially obscured by the above process however appears grossly stable. Aortic valve replacement. Stable position of the left chest pacemaker. Trace left pleural effusion. Streaky opacities at the left lung base favoring atelectasis. No pneumothorax. Unchanged osseous structures.     Left lung/pleural US 8/23

## 2024-08-24 NOTE — PROGRESS NOTES
[facility]    Medicine Progress Note - Hospitalist Service    Date of Admission:  8/17/2024    Assessment & Plan   74-year-old female with a past medical history of a flutter, COPD, ongoing tobacco abuse, previous pleural effusions and chronic pain admitted to the hospital with empyema; transferred from [facility] to [facility] for surgical management.     Empyema  --- s/p VATS - per surgery no true emphyema or rind found - no ability to expand lung  --- Repeat CT 08/20 shows pasty right pleural effusion s/p chest tube placement, moderate likely loculated/complicated residual as well as small associated right pneumothorax  Reexpansion of right upper lobe with persistent collapse of right middle and lower lobe segments.  Significant retained secretions greater in the right lower lobe. Increasing left pleural effusion  ---Plan was for bronchoscopy 08/21, not done as patient was requiring upto 10L oxygen, due to risk of being intubated  ---Started Xopenex, hypertonic saline. Volera - did not tolerate, Using flutter valve  ---Bronchoscopy in the future if improves with bronchial hygiene  --- having daily CXR   --- General surgery and pulmonology following  --- Continue chest tube to water seal, managed by surgery, possible removal tomorrow 08/25  --- first bronchoscopy 8/11 cultures showing E. coli and Candida albicans with pleural effusion cultures showing strep pneumo  --- ID followed at , changed antibiotics from ceftriaxone to Zosyn and fluconazole and recommended source control (likely IV Ceftriaxone at discharge)  --- seen by SLP, recommend regular/thin liquids     Acute on chronic respiratory failure  Advanced COPD  Tobacco dependence  --- Has been on 4 L, almost at baseline 3-4L NC at home  --- Has been on oral taper of steroids for her COPD  --- prednisone taper  --- PTA ICS/LABA  --- Pulmonology following  --- encourage smoking cessation     Paroxysmal A-fib/a flutter; status  "post PPM  Aortic stenosis status post TAVR  Acute on chronic HFpEF  --- Previously  on Cardizem drip at   --- Currently on metoprolol and diltiazem and digoxin with hold parameters  --- She is off amiodarone due to QTc prolongation  --- PTA lasix 40mg  --- On Eliquis  --- Cardiology signed off 8/15  --echo 4/2024 with EF 55-60%   --- monitor on telemetry   --- mag ok     Deconditioning   --- Previously recommended to go to TCU and has bed there.  ---PT/OT recommend TCU     Normocytic anemia; overall stable     Hyperlipidemia--statin ordered     Diabetes mellitus, type II  --- Not on home meds  --- Last A1c 6.2  --- Continue insulin sliding scale    Sacral erythema  Berna anal skin breakdown  Calmoseptine  WOC consult            Diet: Fluid restriction 1800 ML FLUID  Regular Diet Adult    DVT Prophylaxis: DOAC  Chairez Catheter: Not present  Lines: PRESENT      PICC 08/10/24 Triple Lumen Right Brachial vein medial-Site Assessment: WDL      Cardiac Monitoring: None  Code Status: Full Code      Clinically Significant Risk Factors              # Hypoalbuminemia: Lowest albumin = 2.2 g/dL at 8/18/2024  7:04 AM, will monitor as appropriate     # Hypertension: Noted on problem list           # Overweight: Estimated body mass index is 25.53 kg/m  as calculated from the following:    Height as of 8/10/24: 1.651 m (5' 5\").    Weight as of this encounter: 69.6 kg (153 lb 7 oz).      # Financial/Environmental Concerns: none   # Pacemaker present             Disposition Plan     Medically Ready for Discharge: Anticipated in 2-4 Days             Lauren Willson MD  Hospitalist Service  [facility]  Securely message with [employer] (more info)  Text page via [employer] Paging/Directory   ______________________________________________________________________    Interval History   Patient was examined at the bedside, states she has pain at the chest tube site, taking only tylenol as needed, refused other pain " meds  Oxygen requirements at near baseline, chest tube managed by surgery  TCU when ready    Physical Exam   Vital Signs: Temp: 98.3  F (36.8  C) Temp src: Oral BP: 104/55 Pulse: 84   Resp: 20 SpO2: 92 % O2 Device: Nasal cannula Oxygen Delivery: 4 LPM  Weight: 153 lbs 7.04 oz    General Appearance:  Pleasant frail, NAD, on 4 LPM NC  Respiratory: Relatively clear, mild rhonchi anteriorly.  Chest tube noted  Cardiovascular: RRR  GI: Soft and nontender without hepatosplenomegaly, masses palpable.  Skin: no significant lower extremity edema.  Frail skin with senile purpura and tattoos noted without open areas.  Other:  neuro grossly intact without focal deficits appreciated    Medical Decision Making       50 MINUTES SPENT BY ME on the date of service doing chart review, history, exam, documentation & further activities per the note.      Data     I have personally reviewed the following data over the past 24 hrs:    N/A  \   N/A   / N/A     142 104 15.4 /  175 (H)   3.7 31 (H) 0.49 (L) \       Imaging results reviewed over the past 24 hrs:   Recent Results (from the past 24 hour(s))   XR Chest Port 1 View    Narrative    EXAM: XR CHEST PORT 1 VIEW  LOCATION: [facility]  DATE: 8/24/2024    INDICATION: Recheck Chest tube, s p VATS 8 19, right lung re expansion with pulmonary toileting  COMPARISON: 8/23/2024.      Impression    IMPRESSION: Right apical chest tube remains in position. Right PICC tip in the mid SVC. Post endovascular repair of the aortic valve. Left subclavian pacemaker unchanged. Improved aeration of the right lung. No pneumothorax visible. Small bilateral   pleural effusions and atelectasis/infiltrate of both lower lungs. Interstitial prominence consistent with mild edema. Stable cardiomegaly.

## 2024-08-24 NOTE — PLAN OF CARE
Goal Outcome Evaluation:    Problem: Comorbidity Management  Goal: Maintenance of COPD Symptom Control  Outcome: Progressing  Goal: Blood Glucose Levels Within Targeted Range  Outcome: Progressing  Goal: Blood Pressure in Desired Range  Outcome: Progressing     Problem: Gas Exchange Impaired  Goal: Optimal Gas Exchange  Outcome: Progressing     Problem: Respiratory Compromise (Pneumothorax)  Goal: Optimal Oxygenation and Ventilation  Outcome: Progressing        NURSING PROGRESS NOTE  Shift Summary      Date: August 24, 2024     Neuro/Musculoskeletal:  A&Ox4.   Cardiac:  VSS. Pacemaker    Respiratory:  Sating in the low 90s on 4L NC.  GI/:  Adequate urine output via purewick.  LBM: 8/23  Diet/Appetite:  Tolerating REG diet and 1800ml FLR.  Activity:  Assist of 2   Pain:  Moderate aching pain in the R chest area.   Skin:  Breakdown in sacral/juanjo area, WOC consult and calmoseptine ordered.   LDAs + Drips/IVF:  Chest tube to water seal, no output this shift. 3x PICC.  Protocols/Labs:  K and Mg, morning rechecks.       Plan:  Daily chest XR. IV abx.       Ralf Colin RN  ....................................................

## 2024-08-24 NOTE — PROGRESS NOTES
General Surgery Progress Note:    Hospital Day # 6    ASSESSMENT:  1. Empyema (H)    2. Atelectasis        Mary Kay Tejada is a 74 year old female with complicated PMH including HFpEF, COPD on 3L at home, A-fib on Eliquis, here with empyema most recently s/p VATS and 32 Fr chest tube 8/19 without full re-expansion of the right lung. Pulm planned repeat bronchoscopy (first done 8/11) 8/21 to further evaluate lung however patient with increased O2 at 10L in [place] and deferred procedure. Patient is no longer wishing for this procedure if there is higher risk of remaining intubated afterward. CXR with some improvement on the right upper lung since 8/22.  Please chest tube to waterseal 8/22, will continue with this until output is less than 100 cc/day and reassess. Appears to be minimal drainage the last 24hrs      PLAN:  -Diet as tolerated  -Continue aggressive pulmonary toileting per pulm  -Continue chest tube to water seal - chart accurate outputs, possible removal as early as tomorrow if output remains low  -Repeat daily chest x-ray while tube is in  -Medical management per primary team    SUBJECTIVE:   Doing fine, no SOB      Patient Vitals for the past 24 hrs:   BP Temp Temp src Pulse Resp SpO2   08/24/24 1236 108/55 -- -- 84 20 92 %   08/24/24 0831 -- -- -- -- -- 93 %   08/24/24 0750 124/63 98.3  F (36.8  C) Oral 83 18 92 %   08/23/24 2347 119/60 97.9  F (36.6  C) Oral 80 18 91 %   08/23/24 2018 118/64 -- -- 80 18 --   08/23/24 1600 -- -- -- -- -- 92 %   08/23/24 1552 104/59 98  F (36.7  C) Oral 82 18 (!) 84 %         PHYSICAL EXAM:  General: patient seen resting in bed in no acute distress, does have a productive cough  Drains: chest tube right posterior chest wall in place, no air leak, scant drainage compared to 8/23 blue line on chamber  Extremities: No cyanosis visualized on exam, no obvious deformities    08/23 0700 - 08/24 0659  In: 910 [P.O.:900; I.V.:10]  Out: 2690 [Urine:1250]    Admission on 08/17/2024    Component Date Value    pH Venous 08/17/2024 7.41     pCO2 Venous 08/17/2024 56 (H)     pO2 Venous 08/17/2024 37     Bicarbonate Venous 08/17/2024 36 (H)     Base Excess/Deficit Veno* 08/17/2024 10.9 (H)     FIO2 08/17/2024 32     Oxyhemoglobin Venous 08/17/2024 63 (L)     O2 Sat, Venous 08/17/2024 64.4 (L)     GLUCOSE BY METER POCT 08/17/2024 228 (H)     GLUCOSE BY METER POCT 08/17/2024 239 (H)     Sodium 08/18/2024 140     Potassium 08/18/2024 4.0     Chloride 08/18/2024 103     Carbon Dioxide (CO2) 08/18/2024 34 (H)     Anion Gap 08/18/2024 3 (L)     Urea Nitrogen 08/18/2024 14.4     Creatinine 08/18/2024 0.50 (L)     GFR Estimate 08/18/2024 >90     Calcium 08/18/2024 8.5 (L)     Glucose 08/18/2024 143 (H)     Magnesium 08/18/2024      Hemoglobin A1C 08/18/2024      GLUCOSE BY METER POCT 08/18/2024 134 (H)     Magnesium 08/18/2024 2.1     Protein Total 08/18/2024 5.2 (L)     Albumin 08/18/2024 2.2 (L)     Bilirubin Total 08/18/2024 0.4     Alkaline Phosphatase 08/18/2024 105     AST 08/18/2024 20     ALT 08/18/2024 31     Bilirubin Direct 08/18/2024 <0.20     GLUCOSE BY METER POCT 08/18/2024 189 (H)     GLUCOSE BY METER POCT 08/18/2024 324 (H)     GLUCOSE BY METER POCT 08/18/2024 296 (H)     Sodium 08/19/2024 144     Potassium 08/19/2024 4.2     Chloride 08/19/2024 107     Carbon Dioxide (CO2) 08/19/2024 31 (H)     Anion Gap 08/19/2024 6 (L)     Urea Nitrogen 08/19/2024 17.6     Creatinine 08/19/2024 0.55     GFR Estimate 08/19/2024 >90     Calcium 08/19/2024 8.3 (L)     Glucose 08/19/2024 127 (H)     Magnesium 08/19/2024 2.2     WBC Count 08/19/2024 10.4     RBC Count 08/19/2024 4.26     Hemoglobin 08/19/2024 10.2 (L)     Hematocrit 08/19/2024 36.8     MCV 08/19/2024 86     MCH 08/19/2024 23.9 (L)     MCHC 08/19/2024 27.7 (L)     RDW 08/19/2024 17.5 (H)     Platelet Count 08/19/2024 274     GLUCOSE BY METER POCT 08/19/2024 126 (H)     GLUCOSE BY METER POCT 08/19/2024 138 (H)     ABO/RH(D) 08/19/2024 O POS      Antibody Screen 08/19/2024 Negative     SPECIMEN EXPIRATION DATE 08/19/2024 [id number]     Final Diagnosis 08/19/2024                      Value:This result contains rich text formatting which cannot be displayed here.    Gross Description 08/19/2024                      Value:This result contains rich text formatting which cannot be displayed here.    Microscopic Description 08/19/2024                      Value:This result contains rich text formatting which cannot be displayed here.    Performing Labs 08/19/2024                      Value:This result contains rich text formatting which cannot be displayed here.    GLUCOSE BY METER POCT 08/19/2024 159 (H)     GLUCOSE BY METER POCT 08/19/2024 199 (H)     GLUCOSE BY METER POCT 08/19/2024 150 (H)     Sodium 08/20/2024 143     Potassium 08/20/2024 4.5     Chloride 08/20/2024 106     Carbon Dioxide (CO2) 08/20/2024 32 (H)     Anion Gap 08/20/2024 5 (L)     Urea Nitrogen 08/20/2024 15.9     Creatinine 08/20/2024 0.51     GFR Estimate 08/20/2024 >90     Calcium 08/20/2024 8.1 (L)     Glucose 08/20/2024 138 (H)     Magnesium 08/20/2024 1.9     WBC Count 08/20/2024 11.9 (H)     RBC Count 08/20/2024 4.12     Hemoglobin 08/20/2024 10.0 (L)     Hematocrit 08/20/2024 35.8     MCV 08/20/2024 87     MCH 08/20/2024 24.3 (L)     MCHC 08/20/2024 27.9 (L)     RDW 08/20/2024 17.7 (H)     Platelet Count 08/20/2024 271     GLUCOSE BY METER POCT 08/20/2024 139 (H)     GLUCOSE BY METER POCT 08/20/2024 135 (H)     GLUCOSE BY METER POCT 08/20/2024 128 (H)     GLUCOSE BY METER POCT 08/20/2024 112 (H)     Sodium 08/21/2024 144     Potassium 08/21/2024 4.0     Chloride 08/21/2024 106     Carbon Dioxide (CO2) 08/21/2024 30 (H)     Anion Gap 08/21/2024 8     Urea Nitrogen 08/21/2024 16.0     Creatinine 08/21/2024 0.49 (L)     GFR Estimate 08/21/2024 >90     Calcium 08/21/2024 8.4 (L)     Glucose 08/21/2024 78     WBC Count 08/21/2024 12.4 (H)     RBC Count 08/21/2024 4.17     Hemoglobin  08/21/2024 10.1 (L)     Hematocrit 08/21/2024 36.1     MCV 08/21/2024 87     MCH 08/21/2024 24.2 (L)     MCHC 08/21/2024 28.0 (L)     RDW 08/21/2024 18.1 (H)     Platelet Count 08/21/2024 273     Ventricular Rate 08/21/2024 83     Atrial Rate 08/21/2024 83     NV Interval 08/21/2024 210     QRS Duration 08/21/2024 92     QT 08/21/2024 336     QTc 08/21/2024 394     P Axis 08/21/2024 85     R AXIS 08/21/2024 -57     T Axis 08/21/2024 103     Interpretation ECG 08/21/2024                      Value:Atrial flutter  Left axis deviation  Pulmonary disease pattern  Nonspecific ST and T wave abnormality  Abnormal ECG  When compared with ECG of 12-Aug-2024 10:57,  Vent. rate has decreased by  56 bpm  ST now depressed in Inferior leads  Confirmed by SHOSHANA GARCIA MD LOC:JN ([id number]) on 8/21/2024 8:17:11 AM      Magnesium 08/21/2024 2.1     GLUCOSE BY METER POCT 08/21/2024 71     GLUCOSE BY METER POCT 08/21/2024 84     GLUCOSE BY METER POCT 08/21/2024 102 (H)     GLUCOSE BY METER POCT 08/21/2024 179 (H)     Sodium 08/22/2024 142     Potassium 08/22/2024 4.0     Chloride 08/22/2024 106     Carbon Dioxide (CO2) 08/22/2024 30 (H)     Anion Gap 08/22/2024 6 (L)     Urea Nitrogen 08/22/2024 22.4     Creatinine 08/22/2024 0.56     GFR Estimate 08/22/2024 >90     Calcium 08/22/2024 8.4 (L)     Glucose 08/22/2024 114 (H)     Magnesium 08/22/2024 2.0     WBC Count 08/22/2024 10.7     RBC Count 08/22/2024 3.97     Hemoglobin 08/22/2024 9.7 (L)     Hematocrit 08/22/2024 34.1 (L)     MCV 08/22/2024 86     MCH 08/22/2024 24.4 (L)     MCHC 08/22/2024 28.4 (L)     RDW 08/22/2024 17.9 (H)     Platelet Count 08/22/2024 272     % Neutrophils 08/22/2024 83     % Lymphocytes 08/22/2024 8     % Monocytes 08/22/2024 7     % Eosinophils 08/22/2024 0     % Basophils 08/22/2024 0     % Immature Granulocytes 08/22/2024 1     NRBCs per 100 WBC 08/22/2024 0     Absolute Neutrophils 08/22/2024 9.0 (H)     Absolute Lymphocytes 08/22/2024 0.9     Absolute  Monocytes 08/22/2024 0.8     Absolute Eosinophils 08/22/2024 0.0     Absolute Basophils 08/22/2024 0.0     Absolute Immature Granul* 08/22/2024 0.1     Absolute NRBCs 08/22/2024 0.0     GLUCOSE BY METER POCT 08/22/2024 104 (H)     GLUCOSE BY METER POCT 08/22/2024 205 (H)     GLUCOSE BY METER POCT 08/22/2024 192 (H)     GLUCOSE BY METER POCT 08/22/2024 298 (H)     Sodium 08/23/2024 140     Potassium 08/23/2024 3.5     Chloride 08/23/2024 104     Carbon Dioxide (CO2) 08/23/2024 31 (H)     Anion Gap 08/23/2024 5 (L)     Urea Nitrogen 08/23/2024 18.1     Creatinine 08/23/2024 0.55     GFR Estimate 08/23/2024 >90     Calcium 08/23/2024 8.2 (L)     Glucose 08/23/2024 119 (H)     Magnesium 08/23/2024 1.8     GLUCOSE BY METER POCT 08/23/2024 114 (H)         Yazan Lieberman PA-C  [facility]  Surgery [address]  [address]  [place] [address]?  Office: [phone]

## 2024-08-24 NOTE — PLAN OF CARE
Problem: Adult Inpatient Plan of Care  Goal: Optimal Comfort and Wellbeing  Intervention: Monitor Pain and Promote Comfort  Recent Flowsheet Documentation  Taken 8/24/2024 1054 by Kizzy Lazar, RN  Pain Management Interventions:   medication (see MAR)   distraction   pillow support provided   repositioned  Taken 8/24/2024 0750 by Kizzy Lazar, RN  Pain Management Interventions:   food   emotional support   distraction  Intervention: Provide Person-Centered Care  Recent Flowsheet Documentation  Taken 8/24/2024 0750 by Kizzy Lazar, RN  Trust Relationship/Rapport:   care explained   emotional support provided     Problem: Gas Exchange Impaired  Goal: Optimal Gas Exchange  Outcome: Progressing  Intervention: Optimize Oxygenation and Ventilation  Recent Flowsheet Documentation  Taken 8/24/2024 0750 by Kizzy Lazar, RN  Head of Bed (HOB) Positioning: HOB at 30 degrees     Problem: Comorbidity Management  Goal: Blood Pressure in Desired Range  Outcome: Progressing  Intervention: Maintain Blood Pressure Management  Recent Flowsheet Documentation  Taken 8/24/2024 0750 by Kizzy Lazar, RN  Medication Review/Management: medications reviewed     Problem: Respiratory Compromise (Pneumothorax)  Goal: Optimal Oxygenation and Ventilation  Outcome: Progressing   Goal Outcome Evaluation:       Patient is alert and orientated times 4. O2 at 4L with o2 saturation 92%. Chest tube to water seal. No output this shift. Tylenol effective for pain management of right chest wall pain. Continue to monitor chest tube and pain level.

## 2024-08-25 ENCOUNTER — APPOINTMENT (OUTPATIENT)
Dept: RADIOLOGY | Facility: HOSPITAL | Age: 74
DRG: 166 | End: 2024-08-25
Attending: SURGERY
Payer: COMMERCIAL

## 2024-08-25 ENCOUNTER — APPOINTMENT (OUTPATIENT)
Dept: RADIOLOGY | Facility: HOSPITAL | Age: 74
DRG: 166 | End: 2024-08-25
Payer: COMMERCIAL

## 2024-08-25 LAB
ANION GAP SERPL CALCULATED.3IONS-SCNC: 5 MMOL/L (ref 7–15)
BACTERIA BRONCH: NORMAL
BACTERIA BRONCH: NORMAL
BUN SERPL-MCNC: 11.4 MG/DL (ref 8–23)
CALCIUM SERPL-MCNC: 7.9 MG/DL (ref 8.8–10.4)
CHLORIDE SERPL-SCNC: 104 MMOL/L (ref 98–107)
CREAT SERPL-MCNC: 0.46 MG/DL (ref 0.51–0.95)
EGFRCR SERPLBLD CKD-EPI 2021: >90 ML/MIN/1.73M2
GLUCOSE BLDC GLUCOMTR-MCNC: 132 MG/DL (ref 70–99)
GLUCOSE BLDC GLUCOMTR-MCNC: 174 MG/DL (ref 70–99)
GLUCOSE BLDC GLUCOMTR-MCNC: 188 MG/DL (ref 70–99)
GLUCOSE BLDC GLUCOMTR-MCNC: 250 MG/DL (ref 70–99)
GLUCOSE BLDC GLUCOMTR-MCNC: 99 MG/DL (ref 70–99)
GLUCOSE SERPL-MCNC: 120 MG/DL (ref 70–99)
HCO3 SERPL-SCNC: 36 MMOL/L (ref 22–29)
MAGNESIUM SERPL-MCNC: 1.8 MG/DL (ref 1.7–2.3)
PLATELET # BLD AUTO: 206 10E3/UL (ref 150–450)
POTASSIUM SERPL-SCNC: 3.4 MMOL/L (ref 3.4–5.3)
POTASSIUM SERPL-SCNC: 3.5 MMOL/L (ref 3.4–5.3)
SODIUM SERPL-SCNC: 145 MMOL/L (ref 135–145)

## 2024-08-25 PROCEDURE — 71045 X-RAY EXAM CHEST 1 VIEW: CPT | Mod: 77

## 2024-08-25 PROCEDURE — 83735 ASSAY OF MAGNESIUM: CPT | Performed by: STUDENT IN AN ORGANIZED HEALTH CARE EDUCATION/TRAINING PROGRAM

## 2024-08-25 PROCEDURE — 99233 SBSQ HOSP IP/OBS HIGH 50: CPT | Performed by: STUDENT IN AN ORGANIZED HEALTH CARE EDUCATION/TRAINING PROGRAM

## 2024-08-25 PROCEDURE — 71045 X-RAY EXAM CHEST 1 VIEW: CPT

## 2024-08-25 PROCEDURE — 94640 AIRWAY INHALATION TREATMENT: CPT | Mod: 76

## 2024-08-25 PROCEDURE — 80048 BASIC METABOLIC PNL TOTAL CA: CPT | Performed by: SPECIALIST

## 2024-08-25 PROCEDURE — 250N000009 HC RX 250

## 2024-08-25 PROCEDURE — 250N000013 HC RX MED GY IP 250 OP 250 PS 637: Performed by: STUDENT IN AN ORGANIZED HEALTH CARE EDUCATION/TRAINING PROGRAM

## 2024-08-25 PROCEDURE — 250N000013 HC RX MED GY IP 250 OP 250 PS 637: Performed by: SPECIALIST

## 2024-08-25 PROCEDURE — 999N000157 HC STATISTIC RCP TIME EA 10 MIN

## 2024-08-25 PROCEDURE — 94640 AIRWAY INHALATION TREATMENT: CPT

## 2024-08-25 PROCEDURE — 250N000011 HC RX IP 250 OP 636: Performed by: SPECIALIST

## 2024-08-25 PROCEDURE — 99232 SBSQ HOSP IP/OBS MODERATE 35: CPT | Performed by: INTERNAL MEDICINE

## 2024-08-25 PROCEDURE — 85049 AUTOMATED PLATELET COUNT: CPT | Performed by: SPECIALIST

## 2024-08-25 PROCEDURE — 120N000013 HC R&B IMCU

## 2024-08-25 PROCEDURE — 250N000013 HC RX MED GY IP 250 OP 250 PS 637: Performed by: FAMILY MEDICINE

## 2024-08-25 PROCEDURE — 250N000009 HC RX 250: Performed by: INTERNAL MEDICINE

## 2024-08-25 PROCEDURE — 84132 ASSAY OF SERUM POTASSIUM: CPT | Performed by: STUDENT IN AN ORGANIZED HEALTH CARE EDUCATION/TRAINING PROGRAM

## 2024-08-25 PROCEDURE — 250N000011 HC RX IP 250 OP 636: Performed by: FAMILY MEDICINE

## 2024-08-25 PROCEDURE — 250N000013 HC RX MED GY IP 250 OP 250 PS 637: Performed by: INTERNAL MEDICINE

## 2024-08-25 PROCEDURE — 250N000012 HC RX MED GY IP 250 OP 636 PS 637: Performed by: FAMILY MEDICINE

## 2024-08-25 RX ORDER — POTASSIUM CHLORIDE 1500 MG/1
40 TABLET, EXTENDED RELEASE ORAL ONCE
Status: COMPLETED | OUTPATIENT
Start: 2024-08-25 | End: 2024-08-25

## 2024-08-25 RX ORDER — LEVALBUTEROL INHALATION SOLUTION 1.25 MG/3ML
1.25 SOLUTION RESPIRATORY (INHALATION) 4 TIMES DAILY
Status: DISCONTINUED | OUTPATIENT
Start: 2024-08-25 | End: 2024-08-25

## 2024-08-25 RX ORDER — LEVALBUTEROL INHALATION SOLUTION 1.25 MG/3ML
1.25 SOLUTION RESPIRATORY (INHALATION) 4 TIMES DAILY
Status: DISCONTINUED | OUTPATIENT
Start: 2024-08-25 | End: 2024-09-09 | Stop reason: HOSPADM

## 2024-08-25 RX ORDER — ACETAMINOPHEN 325 MG/1
650 TABLET ORAL EVERY 4 HOURS PRN
Status: DISCONTINUED | OUTPATIENT
Start: 2024-08-25 | End: 2024-09-09 | Stop reason: HOSPADM

## 2024-08-25 RX ORDER — ACETAMINOPHEN 650 MG/1
650 SUPPOSITORY RECTAL EVERY 4 HOURS PRN
Status: DISCONTINUED | OUTPATIENT
Start: 2024-08-25 | End: 2024-09-09 | Stop reason: HOSPADM

## 2024-08-25 RX ADMIN — METOPROLOL TARTRATE 50 MG: 25 TABLET, FILM COATED ORAL at 20:18

## 2024-08-25 RX ADMIN — APIXABAN 5 MG: 5 TABLET, FILM COATED ORAL at 08:22

## 2024-08-25 RX ADMIN — LEVALBUTEROL HYDROCHLORIDE 1.25 MG: 1.25 SOLUTION RESPIRATORY (INHALATION) at 19:16

## 2024-08-25 RX ADMIN — PIPERACILLIN AND TAZOBACTAM 3.38 G: 3; .375 INJECTION, POWDER, FOR SOLUTION INTRAVENOUS at 20:17

## 2024-08-25 RX ADMIN — PANTOPRAZOLE SODIUM 40 MG: 40 TABLET, DELAYED RELEASE ORAL at 07:06

## 2024-08-25 RX ADMIN — ACETAMINOPHEN 650 MG: 325 TABLET, FILM COATED ORAL at 14:50

## 2024-08-25 RX ADMIN — IPRATROPIUM BROMIDE 0.5 MG: 0.5 SOLUTION RESPIRATORY (INHALATION) at 19:16

## 2024-08-25 RX ADMIN — ONDANSETRON 4 MG: 2 INJECTION INTRAMUSCULAR; INTRAVENOUS at 02:53

## 2024-08-25 RX ADMIN — METOPROLOL TARTRATE 50 MG: 25 TABLET, FILM COATED ORAL at 09:00

## 2024-08-25 RX ADMIN — PIPERACILLIN AND TAZOBACTAM 3.38 G: 3; .375 INJECTION, POWDER, FOR SOLUTION INTRAVENOUS at 12:12

## 2024-08-25 RX ADMIN — SUCRALFATE 1 G: 1 TABLET ORAL at 07:06

## 2024-08-25 RX ADMIN — SUCRALFATE 1 G: 1 TABLET ORAL at 12:12

## 2024-08-25 RX ADMIN — SODIUM CHLORIDE SOLN NEBU 3% 3 ML: 3 NEBU SOLN at 19:16

## 2024-08-25 RX ADMIN — PIPERACILLIN AND TAZOBACTAM 3.38 G: 3; .375 INJECTION, POWDER, FOR SOLUTION INTRAVENOUS at 04:25

## 2024-08-25 RX ADMIN — DIGOXIN 125 MCG: 125 TABLET ORAL at 09:02

## 2024-08-25 RX ADMIN — ATORVASTATIN CALCIUM 20 MG: 10 TABLET, FILM COATED ORAL at 20:19

## 2024-08-25 RX ADMIN — SUCRALFATE 1 G: 1 TABLET ORAL at 16:38

## 2024-08-25 RX ADMIN — FLUCONAZOLE 200 MG: 2 INJECTION, SOLUTION INTRAVENOUS at 18:46

## 2024-08-25 RX ADMIN — GABAPENTIN 600 MG: 300 CAPSULE ORAL at 13:10

## 2024-08-25 RX ADMIN — POTASSIUM CHLORIDE 40 MEQ: 1500 TABLET, EXTENDED RELEASE ORAL at 08:58

## 2024-08-25 RX ADMIN — FLUTICASONE FUROATE AND VILANTEROL TRIFENATATE 1 PUFF: 200; 25 POWDER RESPIRATORY (INHALATION) at 08:59

## 2024-08-25 RX ADMIN — IPRATROPIUM BROMIDE 0.5 MG: 0.5 SOLUTION RESPIRATORY (INHALATION) at 07:47

## 2024-08-25 RX ADMIN — SODIUM CHLORIDE SOLN NEBU 3% 3 ML: 3 NEBU SOLN at 07:49

## 2024-08-25 RX ADMIN — LIDOCAINE 1 PATCH: 4 PATCH TOPICAL at 20:20

## 2024-08-25 RX ADMIN — GABAPENTIN 600 MG: 300 CAPSULE ORAL at 20:19

## 2024-08-25 RX ADMIN — SODIUM CHLORIDE SOLN NEBU 3% 3 ML: 3 NEBU SOLN at 12:44

## 2024-08-25 RX ADMIN — APIXABAN 5 MG: 5 TABLET, FILM COATED ORAL at 20:19

## 2024-08-25 RX ADMIN — LEVALBUTEROL HYDROCHLORIDE 1.25 MG: 1.25 SOLUTION RESPIRATORY (INHALATION) at 07:49

## 2024-08-25 RX ADMIN — ACETAMINOPHEN 650 MG: 325 TABLET, FILM COATED ORAL at 20:40

## 2024-08-25 RX ADMIN — ONDANSETRON 4 MG: 4 TABLET, ORALLY DISINTEGRATING ORAL at 08:25

## 2024-08-25 RX ADMIN — SENNOSIDES AND DOCUSATE SODIUM 1 TABLET: 8.6; 5 TABLET ORAL at 20:19

## 2024-08-25 RX ADMIN — LEVALBUTEROL HYDROCHLORIDE 1.25 MG: 1.25 SOLUTION RESPIRATORY (INHALATION) at 12:44

## 2024-08-25 RX ADMIN — FUROSEMIDE 40 MG: 20 TABLET ORAL at 09:00

## 2024-08-25 RX ADMIN — IPRATROPIUM BROMIDE 0.5 MG: 0.5 SOLUTION RESPIRATORY (INHALATION) at 12:44

## 2024-08-25 RX ADMIN — PREDNISONE 10 MG: 5 TABLET ORAL at 08:59

## 2024-08-25 RX ADMIN — GABAPENTIN 600 MG: 300 CAPSULE ORAL at 08:59

## 2024-08-25 ASSESSMENT — ACTIVITIES OF DAILY LIVING (ADL)
ADLS_ACUITY_SCORE: 44

## 2024-08-25 NOTE — PROGRESS NOTES
[facility]    Medicine Progress Note - Hospitalist Service    Date of Admission:  8/17/2024    Assessment & Plan   74-year-old female with a past medical history of a flutter, COPD, ongoing tobacco abuse, previous pleural effusions and chronic pain admitted to the hospital with empyema; transferred from [facility] to [facility] for surgical management.     Empyema  --- s/p VATS - per surgery no true emphyema or rind found - no ability to expand lung  --- Repeat CT 08/20 shows pasty right pleural effusion s/p chest tube placement, moderate likely loculated/complicated residual as well as small associated right pneumothorax  Reexpansion of right upper lobe with persistent collapse of right middle and lower lobe segments.  Significant retained secretions greater in the right lower lobe. Increasing left pleural effusion  ---Plan was for bronchoscopy 08/21, not done as patient was requiring upto 10L oxygen, due to risk of being intubated  ---Continue Xopenex + ipratropium, hypertonic saline. Volera - did not tolerate, Using flutter valve  --- having daily CXR   --- General surgery and pulmonology following  --- Chest tube removed today 08/25, repeat CXR pending  --- first bronchoscopy 8/11 cultures showing E. coli and Candida albicans with pleural effusion cultures showing strep pneumo  --- ID followed at , changed antibiotics from ceftriaxone to Zosyn and fluconazole and recommended source control (likely IV Ceftriaxone at discharge)  --- seen by SLP, recommend regular/thin liquids  --- Follow up in Pulmonary clinic 09/25, CT chest to be done prior to visit     Acute on chronic respiratory failure  Advanced COPD  Tobacco dependence  --- Has been on 4 L, almost at baseline 3-4L NC at home  --- Has been on oral taper of steroids for her COPD  --- prednisone taper  --- PTA ICS/LABA  --- Pulmonology following  --- encourage smoking cessation     Paroxysmal A-fib/a flutter; status post  "PPM  Aortic stenosis status post TAVR  Acute on chronic HFpEF  --- Previously  on Cardizem drip at   --- Currently on metoprolol and diltiazem and digoxin with hold parameters  --- She is off amiodarone due to QTc prolongation  --- PTA lasix 40mg  --- On Eliquis  --- Cardiology signed off 8/15  --echo 4/2024 with EF 55-60%   --- monitor on telemetry   --- mag ok     Deconditioning   --- Previously recommended to go to TCU and has bed there.  ---PT/OT recommend TCU     Normocytic anemia; overall stable     Hyperlipidemia--statin ordered     Diabetes mellitus, type II  --- Not on home meds  --- Last A1c 6.2  --- Continue insulin sliding scale    Sacral erythema  Berna anal skin breakdown  Calmoseptine            Diet: Fluid restriction 1800 ML FLUID  Regular Diet Adult    DVT Prophylaxis: DOAC  Chairez Catheter: Not present  Lines: PRESENT      PICC 08/10/24 Triple Lumen Right Brachial vein medial-Site Assessment: WDL      Cardiac Monitoring: None  Code Status: Full Code      Clinically Significant Risk Factors              # Hypoalbuminemia: Lowest albumin = 2.2 g/dL at 8/18/2024  7:04 AM, will monitor as appropriate     # Hypertension: Noted on problem list           # Overweight: Estimated body mass index is 25.53 kg/m  as calculated from the following:    Height as of 8/10/24: 1.651 m (5' 5\").    Weight as of this encounter: 69.6 kg (153 lb 7 oz).      # Financial/Environmental Concerns: none   # Pacemaker present             Disposition Plan     Medically Ready for Discharge: Anticipated Tomorrow             Lauren Willson MD  Hospitalist Service  [facility]  Securely message with [employer] (more info)  Text page via [employer] Paging/Directory   ______________________________________________________________________    Interval History   Patient was examined at the bedside, states she feels weak, Chest tube out by Surgery  Denies any other complaints        Physical Exam   Vital Signs: Temp: " 97.8  F (36.6  C) Temp src: Oral BP: 107/56 Pulse: 87   Resp: 18 SpO2: 93 % O2 Device: Nasal cannula Oxygen Delivery: 4 LPM  Weight: 153 lbs 7.04 oz    General Appearance:  Pleasant frail, NAD, on 4 LPM NC  Respiratory: Relatively clear, mild rhonchi anteriorly.  Chest tube removed  Cardiovascular: RRR  GI: Soft and nontender without hepatosplenomegaly, masses palpable.  Skin: no significant lower extremity edema.  Frail skin with senile purpura and tattoos noted without open areas.  Other:  neuro grossly intact without focal deficits appreciated    Medical Decision Making       50 MINUTES SPENT BY ME on the date of service doing chart review, history, exam, documentation & further activities per the note.      Data     I have personally reviewed the following data over the past 24 hrs:    N/A  \   N/A   / 206     145 104 11.4 /  188 (H)   3.5 36 (H) 0.46 (L) \       Imaging results reviewed over the past 24 hrs:   Recent Results (from the past 24 hour(s))   XR Chest Port 1 View    Narrative    EXAM: XR CHEST PORTABLE 1 VIEW  LOCATION: [facility]  DATE: 08/25/2024    INDICATION: Recheck chest tube, status post VATS 08/19, right lung re-expansion with pulmonary toileting.  COMPARISON: Yesterday.      Impression    IMPRESSION: Right PICC line tip in the SVC. Right chest tube with tip projecting over the right upper lung. No pneumothorax. Endovascular aortic valve replacement. Left infraclavicular pacemaker with lead tips projecting over the right atrium and right   ventricle. Heart size upper limits of normal. Mild to moderate pulmonary vascular congestion with bilateral pleural effusions and associated compressive atelectasis. Degenerative changes bilateral shoulders, greater on the right.

## 2024-08-25 NOTE — PROGRESS NOTES
Care Management Follow Up    Length of Stay (days): 7    Expected Discharge Date: 08/27/2024    Anticipated Discharge Plan:  Transitional Care, Skilled Nursing Facility    Transportation: TBD    PT Recommendations: Transitional Care Facility  OT Recommendations:  Transitional Care Facility     Barriers to Discharge: medical stability    Prior Living Situation: house with child(david), adult     Patient/Spokesperson Updated: Yes. Who? Pt and DORINDA Marie    Additional Information:  SW reviewed chart and met with pt and pts daughter-in-law, Cynthia Hardin, at pts bedside. Discussed planning for TCU. They report understanding that pt clinically accepted at [facility]. Cynthia reprots that their first choice for facility would be [facility] as pts granddaughter, Shea Jacobson, works there as a CNA. Pt and Cynthia both open to [facility] if [facility] unable to accept. SW discussed plan to send referral to [facility] today. SW provided education on TCU process. Pt provides permission to have Cynthia added to her contact list. SW added Cynthia as a contact for the pt. Referral sent to [facility].    JOSEPH Suarez

## 2024-08-25 NOTE — PLAN OF CARE
Problem: Adult Inpatient Plan of Care  Goal: Optimal Comfort and Wellbeing  Intervention: Provide Person-Centered Care  Recent Flowsheet Documentation  Taken 8/24/2024 1614 by Francy Benton RN  Trust Relationship/Rapport:   care explained   emotional support provided     Problem: Gas Exchange Impaired  Goal: Optimal Gas Exchange  Outcome: Progressing  Intervention: Optimize Oxygenation and Ventilation  Recent Flowsheet Documentation  Taken 8/24/2024 1849 by Francy Benton RN  Head of Bed (HOB) Positioning: HOB at 20-30 degrees    Problem: Comorbidity Management  Goal: Blood Pressure in Desired Range  Intervention: Maintain Blood Pressure Management  Recent Flowsheet Documentation  Taken 8/24/2024 1614 by Francy Benton RN  Medication Review/Management: medications reviewed     Problem: Respiratory Compromise (Pneumothorax)  Goal: Optimal Oxygenation and Ventilation  Outcome: Progressing  Goal Outcome Evaluation:  Patient spent a fair shift. Alert and oriented x 4. Vital signs stable. Patient complained of pain rated 8/10. This writer offered prn Tramadol but patient declined, stating she would stick with tylenol instead. Blood sugars 301 and 178 this shift. Patient ate 100% supper.

## 2024-08-25 NOTE — PROGRESS NOTES
Pulmonary Progress Note  8/23/2024      Admit Date: 8/17/2024  CODE: Full Code    Reason for Consult: acute on chronic respiratory failure with hypoxemia, right empyema and pneumonia.    Assessment/Plan:   74F w/ active tobacco dependence, emphysema and presumed COPD with no formal PFTs, chronic hypoxemic resp failure on home O2, DM, fibromyalgia, moderate aortic stenosis, HFpEF, presented with large right sided effusion and right sided mucus plugging with Strep pneumo and E coli pneumonia, did not drain well with chest tube and intrapleural lytics, with persistent opacification of the right hemithorax, transferred to [facility] and underwent right VATS with chest tube placement on 8/19. CXR improving daily. Minimal chest tube output; hoping the tube can be removed today. On baseline O2 requirement. Remains very weak and deconditioned.     Plan:  - titrate FiO2 for goal SpO2 88-92%, avoid hyperoxia, has home oxygen  - continue levalbuterol + ipratroprium nebs QID; flutter valve.   - encourage OOB, PT/OT, push IS  - chest tube mgmt per surgery - hoping the tube can come out today.  - continue PTA ICS/LABA (breo in place of symbicort)  - finish oral prednisone course as ordered  - daily CXR while chest tube in place  - abx per ID  - continue diuresis per [facility]  - has follow up in our pulmonary clinic on 9/25. We will arrange for a CT chest to be done prior to that visit.     No further recommendations; we'll sign off. Please call with additional questions.     Addy (Nelson) MD Z[place]yka  [facility]monary [facility] Critical [facility]  Pager [phone]  Clinic [phone]  Fax [phone]     Subjective/Interim Events:   Feels nauseated.  Breathing is fine. On baseline O2 requirement of 4LPm with SpO2 low 90s.  No hemoptysis.     Chest tube output since placement (in mL)  8/17 790  8/18 1170  8/19 1480  8/20 415  8/21 300  8/22 260   8/23 1440  8/24 0   (8/25  0)      Medications:     Current  Facility-Administered Medications   Medication Dose Route Frequency Provider Last Rate Last Admin     Current Facility-Administered Medications   Medication Dose Route Frequency Provider Last Rate Last Admin    apixaban ANTICOAGULANT (ELIQUIS) tablet 5 mg  5 mg Oral BID Lauern Willson MD   5 mg at 08/25/24 0822    artificial saliva (BIOTENE MT) solution 1 spray  1 spray Mouth/Throat 4x Daily Reéne Soriano MD   1 spray at 08/24/24 0801    atorvastatin (LIPITOR) tablet 20 mg  20 mg Oral At Bedtime Renée Soriano MD   20 mg at 08/24/24 2159    digoxin (LANOXIN) tablet 125 mcg  125 mcg Oral Daily Renée Soriano MD   125 mcg at 08/25/24 0902    diltiazem ER COATED BEADS (CARDIZEM CD/CARTIA XT) 24 hr capsule 120 mg  120 mg Oral Daily Renée Soriano MD   120 mg at 08/24/24 0803    fluconazole (DIFLUCAN) intermittent infusion 200 mg  200 mg Intravenous Q24H Renée Soriano  mL/hr at 08/24/24 1919 200 mg at 08/24/24 1919    fluticasone-vilanterol (BREO ELLIPTA) 200-25 MCG/ACT inhaler 1 puff  1 puff Inhalation Daily Renée Soriano MD   1 puff at 08/25/24 0859    furosemide (LASIX) tablet 40 mg  40 mg Oral Daily Addy Gardiner MD   40 mg at 08/25/24 0900    gabapentin (NEURONTIN) capsule 600 mg  600 mg Oral TID Renée Soriano MD   600 mg at 08/25/24 0859    insulin aspart (NovoLOG) injection (RAPID ACTING)  1-10 Units Subcutaneous TID AC Renée Soriano MD   7 Units at 08/24/24 1714    insulin aspart (NovoLOG) injection (RAPID ACTING)  1-7 Units Subcutaneous At Bedtime Renée Soriano MD   3 Units at 08/23/24 2052    levalbuterol (XOPENEX) neb solution 1.25 mg  1.25 mg Nebulization 4x Daily StoAamir patiño G, RT   1.25 mg at 08/25/24 0749    And    ipratropium (ATROVENT) 0.02 % neb solution 0.5 mg  0.5 mg Nebulization 4x daily Stohr, Bonna G, RT        Lidocaine (LIDOCARE) 4 % Patch 1 patch  1 patch Transdermal Q24h Antonia Baptiste MD   1 patch at 08/24/24 1946    metoprolol tartrate (LOPRESSOR) tablet 50 mg  50 mg  Oral BID Renée Soriano MD   50 mg at 08/25/24 0900    pantoprazole (PROTONIX) EC tablet 40 mg  40 mg Oral QAM AC Renée Soriano MD   40 mg at 08/25/24 0706    piperacillin-tazobactam (ZOSYN) 3.375 g vial to attach to  mL bag  3.375 g Intravenous Q8H Antonia Baptiste MD   3.375 g at 08/25/24 0425    polyethylene glycol (MIRALAX) Packet 17 g  17 g Oral Daily Renée Soriano MD   17 g at 08/22/24 0903    predniSONE (DELTASONE) tablet 10 mg  10 mg Oral Daily Antonia Baptiste MD   10 mg at 08/25/24 0859    senna-docusate (SENOKOT-S/PERICOLACE) 8.6-50 MG per tablet 1 tablet  1 tablet Oral BID Renée Soriano MD   1 tablet at 08/24/24 2159    sodium chloride (NEBUSAL) 3 % neb solution 3 mL  3 mL Nebulization 4x daily Carmelita Buckley MD   3 mL at 08/25/24 0749    sodium chloride (PF) 0.9% PF flush 10 mL  10 mL Intracatheter Q8H PonLauren robles MD   10 mL at 08/25/24 0903    sodium chloride (PF) 0.9% PF flush 10-40 mL  10-40 mL Intracatheter Q7 Days Renée Soriano MD   10 mL at 08/24/24 1700    sterile talc (STERITALC) powder for sclerosing 4 g  4 g INTRAPLEURAL Once Renée Soriano MD        sucralfate (CARAFATE) tablet 1 g  1 g Oral TID AC Renée Soriano MD   1 g at 08/25/24 0706         Exam/Data:   Vitals  /87   Pulse 83   Temp 98  F (36.7  C) (Oral)   Resp 18   Wt 69.6 kg (153 lb 7 oz)   LMP  (LMP Unknown)   SpO2 94%   BMI 25.53 kg/m       I/O last 3 completed shifts:  In: 830 [P.O.:830]  Out: 3450 [Urine:3450]  Weight change:   [unfilled]  Resp: 18    EXAM:  Physical Exam  Gen: awake, alert, oriented, no distress  HEENT: NT, no AUGUST, raspy voice  CV: RRR, no m/g/r  Resp: diminished at right base. Chest tube site c/d/I. No wheezing.  Abd: soft, nontender, BS+  Skin: no rashes or lesions  Ext: no edema  Neuro: PERRL, nonfocal exam    ROS:  A 10-system review was obtained and is negative with the exception of the symptoms noted above.    DATA:    PFT DATA   None available    Micro  PCT wnl on  8/10  Viral swabs neg    Bronch/BAL 8/11  Culture + for e. Coli, pan-sensitive  1+ yeast, candida albicans  Total nuc cells 22, 65% PMN, 9% lymphs, 20% lining cells  Cytology neg for malignancy     Pleural fluid 8/11  Culture + for strep pneumo, pan-sensitive  Total nuc cells 1451  73% PMN, 22% lymphs, 5% mono/mac  Glucose 159    Protein 3.3  TG 19  Cyto from 8/19 neg for malignancy  PJP pCR neg     Sputum 8/12  2+ strep pneumo  1+ e. Coli, pan-sensitive    IMAGING:   XR Chest Port 1 View    Result Date: 8/18/2024  EXAM: XR CHEST PORT 1 VIEW LOCATION: [facility] DATE: 8/18/2024 INDICATION: Recheck chest tube and pleural effusions COMPARISON: 8/17/2024     IMPRESSION: No change since yesterday. Small caliber chest tube projected at the right lung base unchanged. Complete opacification of the right hemithorax with volume loss unchanged. Right PICC line tip in low SVC. Transvenous pacemaker. Hyperinflation left lung.    POC US Chest B-Scan    Limited Bedside Lung Ultrasound, performed and interpreted by me. Indication: empyema and dyspnea With the patient positioned supine, right posterior and lateral lung fields were examined for evidence of thoracic free fluid, pulmonary consolidation, and pulmonary edema. Findings: moderate right pleural effusion noted, mostly free flowing. Some debris noted (plankton sign) Chest tube visualized in the pleural effusion. Right lung atelectasis noted. No obvious loculations although scanning was limited due to presence of dressing. IMPRESSION: moderate to large right pleural effusion with chest tube in place. No obvious loculations noted on this limited bedside pleural/lung POCUS.      XR Chest Port 1 View    Result Date: 8/17/2024  EXAM: XR CHEST PORT 1 VIEW LOCATION: [facility] DATE: 8/17/2024 INDICATION: Recheck chest tube and pleural effusions COMPARISON: CT chest without contrast 08/16/2024, chest radiograph 08/15/2024      IMPRESSION: Stable position of the right basilar pigtail chest tube. Stable complete opacification of the right hemithorax with rib crowding compatible with large pleural effusion and associated collapse of the right lung. Right upper extremity PICC line  with tip overlying the mid aspect of the SVC. Cardiomediastinal silhouette is partially obscured by the above process however appears grossly stable. Aortic valve replacement. Stable position of the left chest pacemaker. Trace left pleural effusion. Streaky opacities at the left lung base favoring atelectasis. No pneumothorax. Unchanged osseous structures.     Left lung/pleural US 8/23

## 2024-08-25 NOTE — PLAN OF CARE
Problem: Adult Inpatient Plan of Care  Goal: Optimal Comfort and Wellbeing  Intervention: Provide Person-Centered Care  Recent Flowsheet Documentation  Taken 8/25/2024 0827 by Kizzy Lazar RN  Trust Relationship/Rapport:   care explained   emotional support provided     Problem: Gas Exchange Impaired  Goal: Optimal Gas Exchange  Intervention: Optimize Oxygenation and Ventilation  Recent Flowsheet Documentation  Taken 8/25/2024 0827 by Kizzy Lazar RN  Head of Bed (HOB) Positioning: HOB at 30 degrees     Problem: Gas Exchange Impaired  Goal: Optimal Gas Exchange  Intervention: Optimize Oxygenation and Ventilation  Recent Flowsheet Documentation  Taken 8/25/2024 0827 by Kizzy Lazar, RN  Head of Bed (HOB) Positioning: HOB at 30 degrees     Problem: Gas Exchange Impaired  Goal: Optimal Gas Exchange  Intervention: Optimize Oxygenation and Ventilation  Recent Flowsheet Documentation  Taken 8/25/2024 0827 by Kizzy Lazar, RN  Head of Bed ([facility]) Positioning: HOB at 30 degrees     Problem: Comorbidity Management  Goal: Blood Glucose Levels Within Targeted Range  Outcome: Progressing  Intervention: Monitor and Manage Glycemia  Recent Flowsheet Documentation  Taken 8/25/2024 0827 by Kizzy Lazar, RN  Medication Review/Management: medications reviewed   Goal Outcome Evaluation:      Patient is alert and orientated times 4. Chest tube removed today by Dr. Site dressing is clean dry and intact. O2 at 4L per nasal canula saturation 93%. Up to side of bed and sat at side of bed for 30 minutes and tolerated.  sliding scale coverage given. Appetite poor. Encourage small meals and snacks.

## 2024-08-25 NOTE — PROGRESS NOTES
General Surgery Progress Note:    Hospital Day # 7  s/p VATs on 8/19/2024     ASSESSMENT:  1. Empyema (H)    2. Atelectasis        Mary Kay Tejada is a 74 year old female with complicated PMH including HFpEF, COPD on 3L at home, A-fib on Eliquis, here with empyema most recently s/p VATS and 32 Fr chest tube 8/19 without full re-expansion of the right lung. Pulm planned repeat bronchoscopy (first done 8/11) 8/21 to further evaluate lung however patient with increased O2 at 10L in [place] and deferred procedure. Patient is no longer wishing for this procedure if there is higher risk of remaining intubated afterward. CXR with some improvement on the right upper lung since 8/22.  Please chest tube to waterseal 8/22, will continue with this until output is less than 100 cc/day and reassess. Appears to be minimal drainage the last 24hrs      PLAN:  Remove chest tube today: Done  Chest x-ray    SUBJECTIVE:   Doing fine, no SOB      Patient Vitals for the past 24 hrs:   BP Temp Temp src Pulse Resp SpO2   08/25/24 0749 -- -- -- -- -- 94 %   08/25/24 0733 119/87 98  F (36.7  C) Oral 83 18 94 %   08/25/24 0441 122/66 98  F (36.7  C) Oral 83 16 (!) 87 %   08/24/24 2330 118/64 98.3  F (36.8  C) Oral 80 18 94 %   08/24/24 1945 110/59 -- -- 82 -- --   08/24/24 1525 110/59 97.6  F (36.4  C) Oral 89 18 92 %   08/24/24 1236 104/55 -- -- 84 20 92 %         PHYSICAL EXAM:  General: patient seen resting in bed in no acute distress, does have a productive cough  Drains: chest tube right posterior chest wall in place, no air leak, scant drainage compared to 8/23 blue line on chamber  Extremities: No cyanosis visualized on exam, no obvious deformities    Very little if any output in the chest tube Pleur-evac.      Perico Umanzor MD  General Surgeon  [facility]  Surgery [facility] - [address]  [address]  [place] [address]  Office: [phone]

## 2024-08-25 NOTE — PLAN OF CARE
Problem: Gas Exchange Impaired  Goal: Optimal Gas Exchange  Outcome: Progressing  Intervention: Optimize Oxygenation and Ventilation  Recent Flowsheet Documentation  Taken 8/25/2024 0449 by Emma Hernández RN  Head of Bed ([facility] [place]) Positioning: HOB at 20-30 degrees  Taken 8/25/2024 0249 by Emma Hernández RN  Head of Bed ([facility] [place]) Positioning: HOB at 20-30 degrees  Taken 8/25/2024 0102 by Emma Hernández RN  Head of Bed ([facility] [place]) Positioning: HOB at 30 degrees  Taken 8/25/2024 0049 by Emma Hernández RN  Head of Bed ([facility] [place]) Positioning: HOB at 20-30 degrees     Problem: Comorbidity Management  Goal: Blood Glucose Levels Within Targeted Range  Outcome: Progressing  Intervention: Monitor and Manage Glycemia  Recent Flowsheet Documentation  Taken 8/25/2024 0102 by Emma Hernández RN  Medication Review/Management: medications reviewed     Problem: Comorbidity Management  Goal: Blood Pressure in Desired Range  Outcome: Progressing  Intervention: Maintain Blood Pressure Management  Recent Flowsheet Documentation  Taken 8/25/2024 0102 by Emma Hernández RN  Medication Review/Management: medications reviewed     Problem: Skin Injury Risk Increased  Goal: Skin Health and Integrity  Outcome: Progressing  Intervention: Plan: Nurse Driven Intervention: Moisture Management  Recent Flowsheet Documentation  Taken 8/25/2024 0102 by Emma Hernández RN  Moisture Interventions: Urinary collection device  Plan: Moisture Management: urinary collection device  Intervention: Plan: Nurse Driven Intervention: Friction and Shear  Recent Flowsheet Documentation  Taken 8/25/2024 0102 by Emma Hernández RN  Friction/Shear Interventions: HOB 30 degrees or less  Intervention: Optimize Skin Protection  Recent Flowsheet Documentation  Taken 8/25/2024 0449 by Emma Hernández RN  Head of Bed ([facility] [place]) Positioning: HOB at 20-30 degrees  Taken 8/25/2024 0249 by Emma Hernández RN  Head of Bed ([facility] [place]) Positioning: HOB at 20-30 degrees  Taken 8/25/2024 0102 by Emma Hernández RN  Activity Management: activity  adjusted per tolerance  Head of Bed (HOB) Positioning: HOB at 30 degrees  Taken 8/25/2024 0049 by Emma Hernández, RN  Head of Bed (HOB) Positioning: HOB at 20-30 degrees     Goal Outcome Evaluation:  Pt A&Ox4. On 4L O2 NC. Purewick in place. On K+ and Mg protocol, AM check. BG was stable overnight. Pt reported nausea around 3 am, 4mg IV Zofran given to managed nausea. Pt reported that it was effective. R chest tube in place, to water seal. Some drainage from chest tube site was noted towards the end of shift. Reported to AM nurse.

## 2024-08-25 NOTE — PROGRESS NOTES
RCAT Treatment Plan    Patient Score: 13  Patient Acuity: 3    Clinical Indication for Therapy: bronchospasm, history of bronchospasm, productive cough, and atelectasis    Therapy Ordered: Xopenex/atrovent/3%/flutter QID    Assessment Summary: Pt remains on 3-4 L (home routine), no increased WOB, lung sounds diminished bilaterally, strong productive cough. Followed by pulmonary.  No changes.     Sanam Munoz, RT  8/25/2024

## 2024-08-26 ENCOUNTER — APPOINTMENT (OUTPATIENT)
Dept: OCCUPATIONAL THERAPY | Facility: HOSPITAL | Age: 74
DRG: 166 | End: 2024-08-26
Attending: FAMILY MEDICINE
Payer: COMMERCIAL

## 2024-08-26 ENCOUNTER — APPOINTMENT (OUTPATIENT)
Dept: PHYSICAL THERAPY | Facility: HOSPITAL | Age: 74
DRG: 166 | End: 2024-08-26
Attending: FAMILY MEDICINE
Payer: COMMERCIAL

## 2024-08-26 ENCOUNTER — APPOINTMENT (OUTPATIENT)
Dept: RADIOLOGY | Facility: HOSPITAL | Age: 74
DRG: 166 | End: 2024-08-26
Payer: COMMERCIAL

## 2024-08-26 LAB
ANION GAP SERPL CALCULATED.3IONS-SCNC: 5 MMOL/L (ref 7–15)
BUN SERPL-MCNC: 10.4 MG/DL (ref 8–23)
CA-I BLD-MCNC: 5 MG/DL (ref 4.4–5.2)
CALCIUM SERPL-MCNC: 8.2 MG/DL (ref 8.8–10.4)
CHLORIDE SERPL-SCNC: 101 MMOL/L (ref 98–107)
CREAT SERPL-MCNC: 0.47 MG/DL (ref 0.51–0.95)
EGFRCR SERPLBLD CKD-EPI 2021: >90 ML/MIN/1.73M2
GLUCOSE BLDC GLUCOMTR-MCNC: 182 MG/DL (ref 70–99)
GLUCOSE BLDC GLUCOMTR-MCNC: 225 MG/DL (ref 70–99)
GLUCOSE BLDC GLUCOMTR-MCNC: 264 MG/DL (ref 70–99)
GLUCOSE BLDC GLUCOMTR-MCNC: 85 MG/DL (ref 70–99)
GLUCOSE BLDC GLUCOMTR-MCNC: 96 MG/DL (ref 70–99)
GLUCOSE SERPL-MCNC: 91 MG/DL (ref 70–99)
HCO3 SERPL-SCNC: 37 MMOL/L (ref 22–29)
MAGNESIUM SERPL-MCNC: 1.6 MG/DL (ref 1.7–2.3)
POTASSIUM SERPL-SCNC: 3.5 MMOL/L (ref 3.4–5.3)
SODIUM SERPL-SCNC: 143 MMOL/L (ref 135–145)

## 2024-08-26 PROCEDURE — 250N000013 HC RX MED GY IP 250 OP 250 PS 637: Performed by: SPECIALIST

## 2024-08-26 PROCEDURE — 250N000013 HC RX MED GY IP 250 OP 250 PS 637: Performed by: FAMILY MEDICINE

## 2024-08-26 PROCEDURE — 99232 SBSQ HOSP IP/OBS MODERATE 35: CPT | Performed by: INTERNAL MEDICINE

## 2024-08-26 PROCEDURE — 120N000001 HC R&B MED SURG/OB

## 2024-08-26 PROCEDURE — 83735 ASSAY OF MAGNESIUM: CPT | Performed by: STUDENT IN AN ORGANIZED HEALTH CARE EDUCATION/TRAINING PROGRAM

## 2024-08-26 PROCEDURE — 250N000009 HC RX 250

## 2024-08-26 PROCEDURE — 97110 THERAPEUTIC EXERCISES: CPT | Mod: GO

## 2024-08-26 PROCEDURE — 250N000012 HC RX MED GY IP 250 OP 636 PS 637: Performed by: FAMILY MEDICINE

## 2024-08-26 PROCEDURE — 250N000009 HC RX 250: Performed by: INTERNAL MEDICINE

## 2024-08-26 PROCEDURE — 250N000013 HC RX MED GY IP 250 OP 250 PS 637: Performed by: INTERNAL MEDICINE

## 2024-08-26 PROCEDURE — 999N000157 HC STATISTIC RCP TIME EA 10 MIN

## 2024-08-26 PROCEDURE — 94640 AIRWAY INHALATION TREATMENT: CPT | Mod: 76

## 2024-08-26 PROCEDURE — 71045 X-RAY EXAM CHEST 1 VIEW: CPT

## 2024-08-26 PROCEDURE — 99231 SBSQ HOSP IP/OBS SF/LOW 25: CPT | Mod: 24

## 2024-08-26 PROCEDURE — 250N000013 HC RX MED GY IP 250 OP 250 PS 637: Performed by: STUDENT IN AN ORGANIZED HEALTH CARE EDUCATION/TRAINING PROGRAM

## 2024-08-26 PROCEDURE — 99233 SBSQ HOSP IP/OBS HIGH 50: CPT | Performed by: STUDENT IN AN ORGANIZED HEALTH CARE EDUCATION/TRAINING PROGRAM

## 2024-08-26 PROCEDURE — 82330 ASSAY OF CALCIUM: CPT | Performed by: STUDENT IN AN ORGANIZED HEALTH CARE EDUCATION/TRAINING PROGRAM

## 2024-08-26 PROCEDURE — 80048 BASIC METABOLIC PNL TOTAL CA: CPT | Performed by: SPECIALIST

## 2024-08-26 PROCEDURE — 97530 THERAPEUTIC ACTIVITIES: CPT | Mod: GP

## 2024-08-26 PROCEDURE — 250N000011 HC RX IP 250 OP 636: Performed by: FAMILY MEDICINE

## 2024-08-26 PROCEDURE — 97110 THERAPEUTIC EXERCISES: CPT | Mod: GP

## 2024-08-26 PROCEDURE — 94640 AIRWAY INHALATION TREATMENT: CPT

## 2024-08-26 PROCEDURE — 250N000011 HC RX IP 250 OP 636: Performed by: SPECIALIST

## 2024-08-26 PROCEDURE — 250N000011 HC RX IP 250 OP 636: Performed by: INTERNAL MEDICINE

## 2024-08-26 RX ORDER — CEFTRIAXONE 2 G/1
2 INJECTION, POWDER, FOR SOLUTION INTRAMUSCULAR; INTRAVENOUS EVERY 24 HOURS
Status: DISCONTINUED | OUTPATIENT
Start: 2024-08-26 | End: 2024-09-05

## 2024-08-26 RX ADMIN — PANTOPRAZOLE SODIUM 40 MG: 40 TABLET, DELAYED RELEASE ORAL at 06:37

## 2024-08-26 RX ADMIN — SENNOSIDES AND DOCUSATE SODIUM 1 TABLET: 8.6; 5 TABLET ORAL at 20:25

## 2024-08-26 RX ADMIN — LIDOCAINE 1 PATCH: 4 PATCH TOPICAL at 20:24

## 2024-08-26 RX ADMIN — GABAPENTIN 600 MG: 300 CAPSULE ORAL at 08:29

## 2024-08-26 RX ADMIN — ATORVASTATIN CALCIUM 20 MG: 10 TABLET, FILM COATED ORAL at 20:24

## 2024-08-26 RX ADMIN — SODIUM CHLORIDE SOLN NEBU 3% 3 ML: 3 NEBU SOLN at 11:19

## 2024-08-26 RX ADMIN — METOPROLOL TARTRATE 50 MG: 25 TABLET, FILM COATED ORAL at 20:25

## 2024-08-26 RX ADMIN — PREDNISONE 10 MG: 5 TABLET ORAL at 08:29

## 2024-08-26 RX ADMIN — Medication 1 SPRAY: at 17:21

## 2024-08-26 RX ADMIN — SODIUM CHLORIDE SOLN NEBU 3% 3 ML: 3 NEBU SOLN at 21:11

## 2024-08-26 RX ADMIN — ACETAMINOPHEN 650 MG: 325 TABLET, FILM COATED ORAL at 13:05

## 2024-08-26 RX ADMIN — FLUCONAZOLE 200 MG: 2 INJECTION, SOLUTION INTRAVENOUS at 19:09

## 2024-08-26 RX ADMIN — IPRATROPIUM BROMIDE 0.5 MG: 0.5 SOLUTION RESPIRATORY (INHALATION) at 16:36

## 2024-08-26 RX ADMIN — FUROSEMIDE 40 MG: 20 TABLET ORAL at 08:29

## 2024-08-26 RX ADMIN — IPRATROPIUM BROMIDE 0.5 MG: 0.5 SOLUTION RESPIRATORY (INHALATION) at 11:19

## 2024-08-26 RX ADMIN — DILTIAZEM HYDROCHLORIDE 120 MG: 120 CAPSULE, EXTENDED RELEASE ORAL at 12:55

## 2024-08-26 RX ADMIN — LEVALBUTEROL HYDROCHLORIDE 1.25 MG: 1.25 SOLUTION RESPIRATORY (INHALATION) at 11:19

## 2024-08-26 RX ADMIN — APIXABAN 5 MG: 5 TABLET, FILM COATED ORAL at 08:30

## 2024-08-26 RX ADMIN — FLUTICASONE FUROATE AND VILANTEROL TRIFENATATE 1 PUFF: 200; 25 POWDER RESPIRATORY (INHALATION) at 08:31

## 2024-08-26 RX ADMIN — SENNOSIDES AND DOCUSATE SODIUM 1 TABLET: 8.6; 5 TABLET ORAL at 08:29

## 2024-08-26 RX ADMIN — SUCRALFATE 1 G: 1 TABLET ORAL at 12:55

## 2024-08-26 RX ADMIN — LEVALBUTEROL HYDROCHLORIDE 1.25 MG: 1.25 SOLUTION RESPIRATORY (INHALATION) at 08:44

## 2024-08-26 RX ADMIN — INSULIN ASPART 2 UNITS: 100 INJECTION, SOLUTION INTRAVENOUS; SUBCUTANEOUS at 20:31

## 2024-08-26 RX ADMIN — LEVALBUTEROL HYDROCHLORIDE 1.25 MG: 1.25 SOLUTION RESPIRATORY (INHALATION) at 16:35

## 2024-08-26 RX ADMIN — SUCRALFATE 1 G: 1 TABLET ORAL at 17:21

## 2024-08-26 RX ADMIN — ACETAMINOPHEN 650 MG: 325 TABLET, FILM COATED ORAL at 08:29

## 2024-08-26 RX ADMIN — IPRATROPIUM BROMIDE 0.5 MG: 0.5 SOLUTION RESPIRATORY (INHALATION) at 21:10

## 2024-08-26 RX ADMIN — SODIUM CHLORIDE SOLN NEBU 3% 3 ML: 3 NEBU SOLN at 08:44

## 2024-08-26 RX ADMIN — APIXABAN 5 MG: 5 TABLET, FILM COATED ORAL at 20:24

## 2024-08-26 RX ADMIN — DIGOXIN 125 MCG: 125 TABLET ORAL at 08:30

## 2024-08-26 RX ADMIN — PIPERACILLIN AND TAZOBACTAM 3.38 G: 3; .375 INJECTION, POWDER, FOR SOLUTION INTRAVENOUS at 04:05

## 2024-08-26 RX ADMIN — GABAPENTIN 600 MG: 300 CAPSULE ORAL at 13:05

## 2024-08-26 RX ADMIN — LEVALBUTEROL HYDROCHLORIDE 1.25 MG: 1.25 SOLUTION RESPIRATORY (INHALATION) at 21:09

## 2024-08-26 RX ADMIN — SODIUM CHLORIDE SOLN NEBU 3% 3 ML: 3 NEBU SOLN at 16:37

## 2024-08-26 RX ADMIN — ACETAMINOPHEN 650 MG: 325 TABLET, FILM COATED ORAL at 04:11

## 2024-08-26 RX ADMIN — GABAPENTIN 600 MG: 300 CAPSULE ORAL at 20:24

## 2024-08-26 RX ADMIN — IPRATROPIUM BROMIDE 0.5 MG: 0.5 SOLUTION RESPIRATORY (INHALATION) at 08:44

## 2024-08-26 RX ADMIN — METOPROLOL TARTRATE 50 MG: 25 TABLET, FILM COATED ORAL at 08:30

## 2024-08-26 RX ADMIN — SUCRALFATE 1 G: 1 TABLET ORAL at 06:37

## 2024-08-26 RX ADMIN — CEFTRIAXONE SODIUM 2 G: 2 INJECTION, POWDER, FOR SOLUTION INTRAMUSCULAR; INTRAVENOUS at 13:06

## 2024-08-26 ASSESSMENT — ACTIVITIES OF DAILY LIVING (ADL)
ADLS_ACUITY_SCORE: 44
ADLS_ACUITY_SCORE: 42
ADLS_ACUITY_SCORE: 44
ADLS_ACUITY_SCORE: 44
ADLS_ACUITY_SCORE: 42
ADLS_ACUITY_SCORE: 42
ADLS_ACUITY_SCORE: 44
ADLS_ACUITY_SCORE: 42
ADLS_ACUITY_SCORE: 44
ADLS_ACUITY_SCORE: 44
ADLS_ACUITY_SCORE: 42
ADLS_ACUITY_SCORE: 44

## 2024-08-26 NOTE — PLAN OF CARE
Problem: Gas Exchange Impaired  Goal: Optimal Gas Exchange  Outcome: Progressing  Intervention: Optimize Oxygenation and Ventilation  Recent Flowsheet Documentation  Taken 8/26/2024 0438 by Emma Hernández RN  Head of Bed ([facility] Positioning: HOB at 20-30 degrees  Taken 8/26/2024 0249 by Emma Hernández RN  Head of Bed ([facility] [place] Positioning: HOB at 20-30 degrees  Taken 8/26/2024 0124 by Emma Hernández RN  Head of Bed ([facility] [place] Positioning: HOB at 30 degrees  Taken 8/26/2024 0049 by Emma Hernández RN  Head of Bed ([facility] [place] Positioning: HOB at 20-30 degrees     Problem: Comorbidity Management  Goal: Blood Glucose Levels Within Targeted Range  Outcome: Progressing  Intervention: Monitor and Manage Glycemia  Recent Flowsheet Documentation  Taken 8/26/2024 0124 by Emma Hernández RN  Glycemic Management: blood glucose monitored  Medication Review/Management: medications reviewed     Problem: Comorbidity Management  Goal: Blood Pressure in Desired Range  Outcome: Progressing  Intervention: Maintain Blood Pressure Management  Recent Flowsheet Documentation  Taken 8/26/2024 0124 by Emma Hernández RN  Medication Review/Management: medications reviewed     Problem: Skin Injury Risk Increased  Goal: Skin Health and Integrity  Outcome: Progressing  Intervention: Plan: Nurse Driven Intervention: Moisture Management  Recent Flowsheet Documentation  Taken 8/26/2024 0124 by Emma Hernández RN  Moisture Interventions:   No brief in bed   Urinary collection device  Plan: Moisture Management:   no brief in bed   urinary collection device  Intervention: Plan: Nurse Driven Intervention: Friction and Shear  Recent Flowsheet Documentation  Taken 8/26/2024 0124 by Emma Hernández RN  Friction/Shear Interventions: HOB 30 degrees or less  Intervention: Optimize Skin Protection  Recent Flowsheet Documentation  Taken 8/26/2024 0438 by Emma Hernández RN  Head of Bed ([facility] [place] Positioning: HOB at 20-30 degrees  Taken 8/26/2024 0249 by Emma Hernández RN  Head of Bed ([facility] [place] Positioning: HOB at 20-30  degrees  Taken 8/26/2024 0124 by Emma Hernández RN  Activity Management: activity adjusted per tolerance  Head of Bed (HOB) Positioning: HOB at 30 degrees  Taken 8/26/2024 0049 by Emma Hernández RN  Head of Bed (HOB) Positioning: HOB at 20-30 degrees  Intervention: Promote and Optimize Oral Intake  Recent Flowsheet Documentation  Taken 8/26/2024 0124 by Emma Hernández RN  Oral Nutrition Promotion: rest periods promoted     Goal Outcome Evaluation:  Pt is A&Ox4. Able to make needs known. Purewick in place. Mg and K+ protocol, AM check. PRN tylenol given to manage pain. Dressing on chest tube site C/D/I. BG stable over night. IV zosyn running.

## 2024-08-26 NOTE — PROGRESS NOTES
INFECTIOUS DISEASE FOLLOW UP NOTE      ASSESSMENT:  History of COPD.  History of aortic stenosis status post TAVR.  Status post pacemaker placement  Recent history of COVID-19 infection complicated with secondary bacterial pneumonia in middle July 2024.  Sputum cultures on 7/13/2024 with E. coli.  Right upper lobe collapse status post thoracentesis on 7/15/2024.  Readmitted with recurrent right-sided pleural effusion status post bronchoscopy on 8/11/2024.  Cultures with E. coli and Candida albicans.  Pleural effusion cultures positive with Streptococcus pneumonia. Chest tube placed 8/11/24. Now s/p VATS, unable to inflate lung. Bronch deferred due to O2 status. Improving, CXR looking better.      Yeast from BAL typically colonizer.     She feels exposed bone upper gums at left. Is edentulous.      PLAN:  Ceftriaxone should be adequate for pneumococcus and E coli. May not need to cover anaerobes as we do when pleural fluid grows microaerophilic strep such as strep anginosus.     Expect about 4 weeks IV antibiotics from chest tube placement, this would be until 9/8/24, will plan for 9/10, will need Chest X ray around that time and consideration of switch to PO augmentin until chest CT with pulmonary.     I can write discharge antibiotic order/labs when discharge is approaching.        Ravindra Bacon MD  [facility]  Contact via [employer] or [facility] [phone]  ______________________________________________________________________    SUBJECTIVE / INTERVAL HISTORY: states she feels horrible, but pain better with tube out. Notes bone coming through upper gum on left. GI upset.     ROS: deconditioned. All other systems negative except as listed above.        OBJECTIVE:  /73 (BP Location: Left arm)   Pulse 86   Temp 98.1  F (36.7  C) (Oral)   Resp 18   Wt 69.6 kg (153 lb 7 oz)   LMP  (LMP Unknown)   SpO2 93%   BMI 25.53 kg/m         Vital Signs  Temp: 97.4  F (36.3   C)  Temp src: Oral  Resp: 20  Pulse: 82  Pulse Rate Source: Monitor  BP: 102/59  BP Location: Left arm    Temp (24hrs), Av.3  F (36.8  C), Min:97.4  F (36.3  C), Max:99.1  F (37.3  C)      GEN: No acute distress.  O2 NC.  RESPIRATORY:  Chest tube on right removed.  CARDIOVASCULAR:  regular  ABDOMEN:  deferred  EXTREMITIES: No edema.  SKIN/HAIR/NAILS:  No rashes  IV: PICC        Antibiotics:  pip/tazo, fluc  On either zosyn or ceftri since 8/10    Pertinent labs:    Recent Labs   Lab 24  0545 24  0500 24  0529 24  0406   WBC  --  10.7 12.4* 11.9*   HGB  --  9.7* 10.1* 10.0*   HCT  --  34.1* 36.1 35.8    272 273 271        Recent Labs   Lab 24  0638 24  0545 24  0629    145 142   CO2 37* 36* 31*   BUN 10.4 11.4 15.4          Lab Results   Component Value Date    ALT 31 2024    AST 20 2024    ALKPHOS 105 2024         MICROBIOLOGY DATA:  Susceptibility data from last 90 days.  Collected Specimen Info Organism Ampicillin Ampicillin/Sulbactam Azithromycin Cefepime Ceftazidime Ceftriaxone Ceftriaxone (meningitis) Ceftriaxone (non-meningitis) Ciprofloxacin Clindamycin Gentamicin Levofloxacin Meropenem   24 Bronchial Alveolar Lavage from Lung, Upper Lobe, Right Escherichia coli                  Normal anna marie                24 Bronchial Alveolar Lavage from Lung, Right Yeast                24 Bronchial Alveolar Lavage from Lung, Lower Lobe, Right Escherichia coli                  Normal anna marie                24 Bronchial Alveolar Lavage from Lung, Right Candida albicans                08/10/24 Sputum from Expectorate Streptococcus pneumoniae    S     S  S   S   S      Escherichia coli  S  S   S  S  S    S   S  S  S   08/10/24 Pleural fluid from Pleural Cavity, Right Streptococcus pneumoniae    S     S  S   S   S    24 Sputum from Expectorate Escherichia coli  S  S   S  S  S    S   S  S  S     Normal anna marie                   Collected Specimen Info Organism Penicillin (meningitis) Penicillin (non-meningitis) Penicillin(oral) Piperacillin/Tazobactam Tobramycin Trimethoprim/Sulfamethoxazole  Vancomycin   08/11/24 Bronchial Alveolar Lavage from Lung, Upper Lobe, Right Escherichia coli            Normal anna marie          08/11/24 Bronchial Alveolar Lavage from Lung, Right Yeast          08/11/24 Bronchial Alveolar Lavage from Lung, Lower Lobe, Right Escherichia coli            Normal anna marie          08/11/24 Bronchial Alveolar Lavage from Lung, Right Candida albicans          08/10/24 Sputum from Expectorate Streptococcus pneumoniae  S  S  S     S     Escherichia coli     S  S  S    08/10/24 Pleural fluid from Pleural Cavity, Right Streptococcus pneumoniae  S  S  S     S   07/13/24 Sputum from Expectorate Escherichia coli     S  S  S      Normal anna marie              RADIOLOGY:  XR Chest Port 1 View    Result Date: 8/26/2024  EXAM: XR CHEST PORT 1 VIEW LOCATION: [facility] DATE: 8/26/2024 INDICATION: Recheck Chest tube, s p VATS 8 19, right lung re expansion with pulmonary toileting COMPARISON: 8/25/2024.     IMPRESSION: The heart is unchanged in appearance. The right PICC and dual-lead pacing device as well as the cardiac valvular prosthesis are all unchanged. The lungs are otherwise unchanged appearance    XR Chest Port 1 View    Result Date: 8/25/2024  EXAM: XR CHEST PORT 1 VIEW LOCATION: [facility] DATE: 8/25/2024 INDICATION: s p chest tube removal. COMPARISON: 8/5/2024 at 0616 hours     IMPRESSION: Prior seen right chest tube has been removed with no evidence for a pneumothorax. Otherwise the chest is stable.    XR Chest Port 1 View    Result Date: 8/25/2024  EXAM: XR CHEST PORTABLE 1 VIEW LOCATION: [facility] DATE: 08/25/2024 INDICATION: Recheck chest tube, status post VATS 08/19, right lung re-expansion with pulmonary toileting. COMPARISON: Yesterday.      IMPRESSION: Right PICC line tip in the SVC. Right chest tube with tip projecting over the right upper lung. No pneumothorax. Endovascular aortic valve replacement. Left infraclavicular pacemaker with lead tips projecting over the right atrium and right ventricle. Heart size upper limits of normal. Mild to moderate pulmonary vascular congestion with bilateral pleural effusions and associated compressive atelectasis. Degenerative changes bilateral shoulders, greater on the right.     XR Chest Port 1 View    Result Date: 8/24/2024  EXAM: XR CHEST PORT 1 VIEW LOCATION: [facility] DATE: 8/24/2024 INDICATION: Recheck Chest tube, s p VATS 8 19, right lung re expansion with pulmonary toileting COMPARISON: 8/23/2024.     IMPRESSION: Right apical chest tube remains in position. Right PICC tip in the mid SVC. Post endovascular repair of the aortic valve. Left subclavian pacemaker unchanged. Improved aeration of the right lung. No pneumothorax visible. Small bilateral pleural effusions and atelectasis/infiltrate of both lower lungs. Interstitial prominence consistent with mild edema. Stable cardiomegaly.    XR Chest Port 1 View    Result Date: 8/23/2024  EXAM: XR CHEST PORT 1 VIEW LOCATION: [facility] DATE: 8/23/2024 INDICATION: Recheck chest tube and right lung s p VATS and placement of water seal done on 8 22 at 1220 COMPARISON: Yesterday     IMPRESSION: Postthoracotomy changes on the right with large bore chest tube. Right upper extremity PICC line catheter in good position. Dual-lead left subclavian venous pacer. TAVR.  Shallower inspiration., Likely little change in the small, loculated hydropneumothorax in the right. Small right apical cap appears unchanged. Stable left pleural effusion with infiltrate or atelectasis in the left base.     XR Chest Port 1 View    Result Date: 8/22/2024  EXAM: XR CHEST PORT 1 VIEW LOCATION: [facility] DATE:  8/22/2024 INDICATION: Recheck chest tube and pleural effusions. Postop thoracotomy and empyema drainage. COMPARISON: CT chest and chest x-ray 8/20/2024 and older studies     IMPRESSION: Postthoracotomy changes on the right with large bore chest tube. Right upper extremity PICC line catheter in good position. Dual-lead left subclavian venous pacer. TAVR. Shallower inspiration., Likely little change in the small, loculated hydropneumothorax in the right. Small right apical cap appears slightly decreased from before. Increased atelectasis in the left base.    CT Chest w/o Contrast    Result Date: 8/20/2024  EXAM: CT CHEST W/O CONTRAST LOCATION: [facility] DATE: 8/20/2024 INDICATION: ?LLL mass or occlusion, unable to re expand lung. COMPARISON: 8/16/2024 TECHNIQUE: CT chest without IV contrast. Multiplanar reformats were obtained. Dose reduction techniques were used. Lack of intravenous contrast significantly limits exam. CONTRAST: None. FINDINGS: LUNGS AND PLEURA: Interval placement of large bore right-sided chest tube with decrease in right pleural effusion. There is a moderate loculated appearing residual. Small anterior pneumothorax now noted. There is improved aeration of the right upper lobe, with persistent right middle lobe and lower lobe collapse and mediastinal shift. Centrilobular emphysematous change with an upper lobe predominance. Increasing, now moderate left pleural effusion with compressive basilar atelectasis. Moderate retained secretions in the central airways. Significant retained secretions in right lower lobe bronchi. MEDIASTINUM/AXILLAE: Right-sided PICC line terminates in the distal SVC. Cardiac pacemaker. TAVR. CORONARY ARTERY CALCIFICATION: Severe. UPPER ABDOMEN: Right adrenal nodular hyperplasia. Significant enlargement of the left adrenal gland, potentially containing multiple low-attenuation nodules measuring up to 2 cm in size. Adenomatous hyperplasia favored.  MUSCULOSKELETAL: No acute bony abnormalities.     IMPRESSION: 1.  Decreased right pleural effusion status post chest tube placement, with moderate, likely loculated/complicated residual, as well as small associated right pneumothorax now evident. 2.  Reexpansion of the right upper lobe with persistent collapse of right middle and lower lobe segments. Significant retained secretions, greatest in the right lower lobe. 3.  Increasing left pleural effusion. 4.  Additional findings as above.     XR Chest Port 1 View    Result Date: 8/20/2024  EXAM: XR CHEST PORT 1 VIEW LOCATION: [facility] DATE: 8/20/2024 INDICATION: Recheck chest tube and pleural effusions. Postop thoracotomy and empyema drainage. COMPARISON: 8/19/2024 and older studies, CT chest 8/16/2024 and older studies     IMPRESSION: Postthoracotomy changes with large bore right-sided chest tube. Removal of the right basilar pigtail chest tube. Dual-lead left subclavian venous pacer, TAVR and right upper extremity PICC line catheter remain in good position. There has been only partial reexpansion of the right upper lobe and apparently non of the right lower lobe. Likely small pneumothorax outlining collapsed lung in the right lateral costophrenic angle. Small right apical pleural cap persists. Volume loss with marked shift of the mediastinum into the right chest has only slightly decreased. Minimal atelectasis in the left base behind the heart with trace effusion again noted. No signs of pneumonia or failure.    XR Chest Port 1 View    Result Date: 8/19/2024  EXAM: XR CHEST PORT 1 VIEW LOCATION: [facility] DATE: 8/19/2024 INDICATION: Recheck chest tube and pleural effusions COMPARISON: August 18, 2024     IMPRESSION: Stable pacemaker, TAVR, right chest tube, right PICC. Minimal change in near complete opacification of the right hemithorax with volume loss.    XR Chest Port 1 View    Result Date: 8/18/2024  EXAM: XR  CHEST PORT 1 VIEW LOCATION: [facility] DATE: 8/18/2024 INDICATION: Recheck chest tube and pleural effusions COMPARISON: 8/17/2024     IMPRESSION: No change since yesterday. Small caliber chest tube projected at the right lung base unchanged. Complete opacification of the right hemithorax with volume loss unchanged. Right PICC line tip in low SVC. Transvenous pacemaker. Hyperinflation left lung.    POC US Chest B-Scan    Limited Bedside Lung Ultrasound, performed and interpreted by me. Indication: empyema and dyspnea With the patient positioned supine, right posterior and lateral lung fields were examined for evidence of thoracic free fluid, pulmonary consolidation, and pulmonary edema. Findings: moderate right pleural effusion noted, mostly free flowing. Some debris noted (plankton sign) Chest tube visualized in the pleural effusion. Right lung atelectasis noted. No obvious loculations although scanning was limited due to presence of dressing. IMPRESSION: moderate to large right pleural effusion with chest tube in place. No obvious loculations noted on this limited bedside pleural/lung POCUS.      XR Chest Port 1 View    Result Date: 8/17/2024  EXAM: XR CHEST PORT 1 VIEW LOCATION: [facility] DATE: 8/17/2024 INDICATION: Recheck chest tube and pleural effusions COMPARISON: CT chest without contrast 08/16/2024, chest radiograph 08/15/2024     IMPRESSION: Stable position of the right basilar pigtail chest tube. Stable complete opacification of the right hemithorax with rib crowding compatible with large pleural effusion and associated collapse of the right lung. Right upper extremity PICC line  with tip overlying the mid aspect of the SVC. Cardiomediastinal silhouette is partially obscured by the above process however appears grossly stable. Aortic valve replacement. Stable position of the left chest pacemaker. Trace left pleural effusion. Streaky opacities at the left  lung base favoring atelectasis. No pneumothorax. Unchanged osseous structures.    CT Chest w/o Contrast    Result Date: 8/16/2024  EXAM: CT CHEST W/O CONTRAST LOCATION: [facility] DATE: 8/16/2024 INDICATION: Recheck chest tube placement, clogged, etc. and recheck effusion given patient increase O2 and tachycardia COMPARISON: 8/13/2024 TECHNIQUE: CT chest without IV contrast. Multiplanar reformats were obtained. Dose reduction techniques were used. CONTRAST: None. FINDINGS: LUNGS AND PLEURA: Large right pleural effusion increased from previous. Complete collapse of the right lung. The right mainstem, upper and lower lobe bronchi are essentially completely occluded increased from previous. Single right chest tube. Small left pleural effusion with passive atelectasis left lung base slightly increased from previous. MEDIASTINUM/AXILLAE: Transvenous pacemaker. Aortic valve prosthesis. Right PICC line tip in SVC. CORONARY ARTERY CALCIFICATION: Severe. UPPER ABDOMEN: No significant finding. MUSCULOSKELETAL: Unremarkable.     IMPRESSION: 1.  Large right pleural effusion increased from previous despite the presence of the chest tube. 2.  Complete collapse of the right lung with complete opacification of the right upper lobe, right lower lobe and right mainstem bronchus increased from previous. 3.  Small left pleural effusion increased from previous.     XR Chest 1 View    Result Date: 8/15/2024  EXAM: XR CHEST 1 VIEW LOCATION: [facility] DATE: 8/15/2024 INDICATION: Treated for empyema, s p chest tube , follow up pleural effusion COMPARISON: CT chest 8/13/2024, chest radiograph 8/11/2024     IMPRESSION: Unchanged position of the pigtail chest tube in the right lung base with similar complete opacification of the right hemithorax. Trace left effusion with dependent atelectasis. Background emphysema. Cardiac silhouette and mediastinal contours  are mostly obscured. Aortic  valve replacement. Left chest cardiac generator and leads are unchanged. Right upper extremity PICC tip terminates in the mid SVC.    XR Video Swallow with SLP or OT    Result Date: 8/14/2024  EXAM: XR VIDEO SWALLOW WITH SLP OR OT LOCATION: [facility] DATE: 8/14/2024 INDICATION: Difficulty swallowing. COMPARISON: None. TECHNIQUE: Routine swallow study with speech pathology using multiple barium thicknesses. RADIATION DOSE: Dose Area Product 23.86 microGy-m2 FINDINGS: Swallow study with Speech Pathology using multiple barium thicknesses. Penetration with thin liquids, which slightly improved with chin tuck maneuver and decreased swallow volumes. No definite aspiration visualized with trialed consistencies.     IMPRESSION: Penetration with thin liquids, however no aspiration visualized. Please see speech pathology report for further details and recommendations.    CT Chest w/o Contrast    Result Date: 8/14/2024  EXAM: CT CHEST W/O CONTRAST LOCATION: [facility] DATE: 8/13/2024 INDICATION: empyema s p chest tube, ?evacuated COMPARISON: 8/10/2024 TECHNIQUE: CT chest without IV contrast. Multiplanar reformats were obtained. Dose reduction techniques were used. CONTRAST: None. FINDINGS: LUNGS AND PLEURA: Right basilar pigtail pleural drain in place. Large right pleural effusion with complete atelectasis of the right lung. A few foci of gas in the pleural space. Small left pleural effusion. Emphysema. Mild frothy secretions in trachea. Debris occludes the central right lung airways. MEDIASTINUM/AXILLAE: Stable left subclavian approach pacemaker and TAVR. Right PICC with tip near the cavoatrial junction. Rightward mediastinal shift. No lymphadenopathy. CORONARY ARTERY CALCIFICATION: Moderate. UPPER ABDOMEN: Unchanged thickening of the left adrenal gland. MUSCULOSKELETAL: Unremarkable     IMPRESSION: 1.  Large right pleural effusion with right lung collapse.     US Abdomen  Limited    Result Date: 8/12/2024  EXAM: US ABDOMEN LIMITED LOCATION: [facility] DATE: 8/12/2024 INDICATION: Abnormal LFTs. COMPARISON: CT 8/10/2024 TECHNIQUE: Limited abdominal ultrasound. FINDINGS: Study is limited by a chest tube and a compression dressing on the right side. Within limitations, findings are as follows. GALLBLADDER: Limited evaluation is within normal limits. No gallstones, wall thickening, or pericholecystic fluid. Negative sonographic Ospina's sign. BILE DUCTS: No biliary dilatation. The common duct measures 7 mm. LIVER: Normal parenchyma with smooth contour. No focal mass. RIGHT KIDNEY: Not seen due to bowel gas. PANCREAS: The pancreas is largely obscured by overlying gas. No ascites.     IMPRESSION: 1.  Normal limited abdominal ultrasound within limitations detailed above.     Cardiac Device Check - Remote    Result Date: 8/12/2024  Encounter Type: alert remote pacemaker transmission for AT/AF >/=6hrs for 2 days. Device: [employer]. Pacing % /Programmed: AP 43%,  <1% at AAIR<=>DDDR 60/130 ppm. Lead(s): stable. Battery longevity: 14yrs, 3mo estimated. Presenting: Atrial flutter with ventricular sensing  bpm. Atrial high rates: since 4/29/24; 24 mode switch episodes, AT/AF appears to be in progress since 7/21/24 2:45PM, burden <1%, v-rates >/=120bpm ~95%. 19 fast A&V episodes, available EGMs appear to be RVR during AF. Anticoagulant: Eliquis. Ventricular High rates: since 4/29/24; None detected. Comments: Normal device function. Plan: Routed to device RN for review. MICHELLE Padgett, Device Specialist ADD: Transmission reviewed, see EPIC for details. Zofia Hodges RN  Device follow up for the entirety of having the Pacemaker, based on best practices determined by [employer] and in Compliance with Medicare guidelines. Continue remote device monitoring per patient plan. I have reviewed and interpreted the device interrogation, settings, programming, and  encounter summary. The device is functioning within normal device parameters. I agree with the current findings, assessment and plan.    XR Chest 1 View    Result Date: 8/11/2024  EXAM: XR CHEST 1 VIEW LOCATION: [facility] DATE: 08/11/2024 INDICATION: New chest tube placement. COMPARISON: 08/10/2024 CXR and CT chest.     IMPRESSION: Interval placement right basilar pleural drainage catheter. Previously seen large right pleural effusion has been nearly completely evacuated and the mid and lower right lung have partially reexpanded. No pneumothorax. Left lung clear. Mild cardiac enlargement. Transcatheter aortic valve replacement. Pacer anterior left chest wall with lead tips in the RA and RV. Right PICC tip RA/SVC junction.     XR Chest Port 1 View    Result Date: 8/10/2024  EXAM: XR CHEST PORT 1 VIEW LOCATION: [facility] DATE: 8/10/2024 INDICATION: RN placed PICC   verify tip placement COMPARISON: 8/10/2024     IMPRESSION: New right PICC with tip near the cavoatrial junction. Otherwise, no significant interval change.    XR Chest Port 1 View    Result Date: 8/10/2024  EXAM: XR CHEST PORTABLE 1 VIEW LOCATION: [facility] DATE: 08/10/2024 INDICATION: Status post right pigtail chest tube. COMPARISON: 08/10/2024 at 1010 hours.     IMPRESSION: No change in dual-lead cardiac pacer or TAVR. Right-sided chest tube has been placed since comparison study with decrease in the previously seen large right pleural effusion. A moderate to large pleural effusion remains. There is likely overlying compressive atelectasis of the inferior portion of the right lung with concurrent infectious process not excluded. There is some indistinctness of the pulmonary interstitium involving the left lower lobe which is new from prior study and may reflect airway inflammation or edema.     CT Chest w Contrast    Result Date: 8/10/2024  EXAM: CT CHEST W CONTRAST LOCATION:  [facility] DATE: 8/10/2024 INDICATION: SOB, large right-sided pleural effusion status post right pigtail COMPARISON: None. TECHNIQUE: CT chest with IV contrast. Multiplanar reformats were obtained. Dose reduction techniques were used. CONTRAST: 90 mL [id number] FINDINGS: LUNGS AND PLEURA: Large right effusion. Pigtail catheter is at the anterior right apex without significant fluid surrounding it. Extensive compressive atelectasis throughout the right lung. Left lung clear. MEDIASTINUM/AXILLAE: No lymphadenopathy. No thoracic aneurysm. CORONARY ARTERY CALCIFICATION: Moderate. UPPER ABDOMEN: No acute findings. MUSCULOSKELETAL: No frankly destructive bony lesions.     IMPRESSION: 1.  Large right effusion and extensive compressive atelectasis versus less likely infiltrate in the right lung. 2.  The pigtail catheter tip is at the anterior apex, most of the fluid is at the base.        XR Chest Port 1 View    Result Date: 8/10/2024  EXAM: XR CHEST PORT 1 VIEW LOCATION: [facility] DATE: 8/10/2024 INDICATION: sob, hypoxic, history of prior ptx, eval for ptx, pna or edema COMPARISON: 7/17/2020     IMPRESSION: Large right pleural effusion. No pneumothorax. Left subclavian approach pacing device. Prosthetic aortic valve. Cardiac silhouette within normal limits. No acute osseous abnormality.    XR Chest Port 1 View    Result Date: 7/17/2024  EXAM: XR CHEST PORT 1 VIEW LOCATION: [facility] DATE: 7/17/2024 INDICATION: Recheck pneumothorax COMPARISON: Portable chest radiograph 07/16/2024     IMPRESSION: Stable position of left chest pacemaker. Prior aortic valve replacement. Slightly decreased size of right hilar pneumothorax measuring 8 mm currently, previously 15 mm. Small right pleural effusion with adjacent compressive atelectasis. Streaky opacities at the left lung base, favoring atelectasis. Stable mildly enlarged cardiomediastinal silhouette.  Mild central pulmonary vascular congestion. Unchanged osseous structures.    XR Chest Port 1 View    Result Date: 7/16/2024  EXAM: XR CHEST PORT 1 VIEW LOCATION: [facility] DATE: 7/16/2024 INDICATION: Follow up PTX COMPARISON: July 15, 2024 2107 hours and other recent prior exams.     IMPRESSION: Cardiac pacing device is again observed along with mediastinal shift toward the right. There is a subpulmonic right pneumothorax which appears unchanged or slightly smaller than on x-ray yesterday. The left lung is clear and free of pneumothorax.    XR Chest Port 1 View    Result Date: 7/15/2024  EXAM: XR CHEST PORT 1 VIEW LOCATION: [facility] DATE: 7/15/2024 INDICATION: follow up pneumothorax COMPARISON: 7/15/2024 at 5:34 PM and 7/14/2024.     IMPRESSION: Small to moderate size right pneumothorax not significantly changed from the study at 5:34 PM today. Stable focal opacity at the right base which may be a small amount of residual pleural effusion, atelectasis and/or infiltrate. Mild shift of  mediastinal structures to the right as before. Stable mild cardiomegaly. Normal pulmonary vascularity. Left subclavian pacemaker unchanged.    XR Chest Port 1 View    Result Date: 7/15/2024  EXAM: XR CHEST PORT 1 VIEW LOCATION: [facility] DATE: 7/15/2024 INDICATION: Right upper lobe collapse. COMPARISON: 7/14/2024.     IMPRESSION: New, small to moderate-sized right pneumothorax, greatest at the peripheral right lung base. Previously large right pleural effusion is no longer present. Small amount of residual pleural fluid versus airspace disease is present at the right lung base. Continued imaging follow-up to resolution is recommended. No left pleural effusion or pneumothorax. Upper limits of normal heart size. Left chest wall cardiac implantable electronic device. Critical Result: Pneumothorax (new, increasing or tension) Finding was identified on  7/15/2024 5:55 PM CDT. Dr. Mendoza was contacted by me on 7/15/2024 6:25 PM CDT and verbalized understanding of the critical result.     US Thoracentesis    Result Date: 7/15/2024  EXAM: 1. RIGHT THORACENTESIS 2. ULTRASOUND GUIDANCE LOCATION: [facility] DATE: 7/15/2024 INDICATION: Pleural effusion. PROCEDURE: Informed consent obtained. Time out performed. The chest was prepped and draped in sterile fashion. 10 mL of 1 % lidocaine was infused into the local soft tissues. Under direct ultrasound guidance, a 5 Panamanian catheter system was placed into the pleural effusion. 0.9 liters of anders-colored fluid were removed and sent to lab, if requested. Patient tolerated procedure well. Ultrasound imaging was obtained and placed in the patient's permanent medical record.     IMPRESSION: Status post right ultrasound-guided thoracentesis. Reference CPT Code: 96496

## 2024-08-26 NOTE — PROGRESS NOTES
General Surgery Progress Note:    Hospital Day # 8    ASSESSMENT:  1. Empyema (H)    2. Atelectasis        Mary Kay Tejada is a 74 year old female with complicated PMH including HFpEF, COPD on 3L at home, A-fib on Eliquis, here with empyema most recently s/p VATS and 32 Fr chest tube 8/19 without full re-expansion of the right lung. Pulm planned repeat bronchoscopy (first done 8/11) 8/21 to further evaluate lung however patient with increased O2 at 10L in [place] and deferred procedure.  Aggressive toileting started with some improvement of right upper lung since 8/22, chest tube to waterseal 8/22 and removal 8/25.  CXR appears stable this morning and patient on baseline 3 to 4 L oxygen.    PLAN:  -Diet as tolerated  -Continue aggressive pulmonary toileting per pulm  -Antibiotics per ID  -Continue with current chest tube site dressing for full 48 hours s/p chest tube removal reinforcing when necessary  -Will facilitate follow-up with patient  -Surgery will sign off at this time  -Medical management per primary team    SUBJECTIVE:   Mary Kay Tejada was seen on rounds.  Patient states she does feel like crap this morning, attributes this to just generally not feeling well and then also taking all of her pills at once this morning.  She did eat breakfast.  Her appetite remains low though she is able to eat some things, she attributes this to not being able to move about as easily.  Patient does feel weak and is encouraged about working to be stronger and going to a TCU.  Patient denies fever, chills, nausea, vomiting.  She is having pain at the chest tube site but otherwise denies shortness of breath.      Patient Vitals for the past 24 hrs:   BP Temp Temp src Pulse Resp SpO2   08/26/24 0844 -- -- -- -- -- 93 %   08/26/24 0757 132/73 98.1  F (36.7  C) Oral 86 18 93 %   08/25/24 2300 113/67 97.6  F (36.4  C) Oral 74 20 94 %   08/25/24 2018 105/56 -- -- 83 -- --   08/25/24 1917 -- -- -- -- -- 90 %   08/25/24 1614 100/57  97.6  F (36.4  C) Oral 86 21 93 %   08/25/24 1257 -- -- -- -- -- 93 %   08/25/24 1222 -- -- -- -- -- 90 %   08/25/24 1152 107/56 97.8  F (36.6  C) Oral 87 18 (!) 87 %         PHYSICAL EXAM:  General: patient seen resting in bed in no acute distress  Resp: no increased work of breathing, breathing comfortably on 4 L oxygen via nasal cannula  Abdomen: Generally soft and nontender  Pulmonary: Breath sounds auscultated along the anterior upper right lung fields, a little to no breath sounds in the right lower lung fields, some of this is obscured by large foam tape dressing.  Emphysematous lungs.  Drains: Previous chest tube site covered with foam tape dressing, did not visualize any leaking through the dressing or below.  Left dressing in place as it is to be on for a full 48 to 72 hours s/p chest tube removal.  Extremities: No edema or cyanosis visualized on exam, no obvious deformities    08/25 0700 - 08/26 0659  In: 120 [P.O.:120]  Out: 500 [Urine:500]    No results displayed because visit has over 200 results.           APRIL Salmeron [facility] General Surgery  [address] [place] [address] [id number] [place] [address] [phone]

## 2024-08-26 NOTE — PROGRESS NOTES
[facility]    Medicine Progress Note - Hospitalist Service    Date of Admission:  8/17/2024    Assessment & Plan   74-year-old female with a past medical history of a flutter, COPD, ongoing tobacco abuse, previous pleural effusions and chronic pain admitted to the hospital with empyema; transferred from [facility] to [facility] for surgical management.     Empyema  --- s/p VATS - per surgery no true emphyema or rind found - no ability to expand lung  --- Repeat CT 08/20 shows pasty right pleural effusion s/p chest tube placement, moderate likely loculated/complicated residual as well as small associated right pneumothorax  Reexpansion of right upper lobe with persistent collapse of right middle and lower lobe segments.  Significant retained secretions greater in the right lower lobe. Increasing left pleural effusion  ---Plan was for bronchoscopy 08/21, not done as patient was requiring upto 10L oxygen, due to risk of being intubated  ---Continue Xopenex + ipratropium, hypertonic saline. [place] - did not tolerate, Using flutter valve  --- having daily CXR   --- General surgery and pulmonology following  --- Chest tube removed today 08/25, repeat CXR no evidence of pneumothorax  --- first bronchoscopy 8/11 cultures showing E. coli and Candida albicans with pleural effusion cultures showing strep pneumo  --- ID following, was on Zosyn and fluconazole, changed to IV Ceftriaxone 08/26, likely will need until 09/08, will need CXR and likely po Augmentin until follow up CT Chest with Pulmonary  --- seen by SLP, recommend regular/thin liquids  --- Follow up in Pulmonary clinic 09/25, CT chest to be done prior to visit  -- Follow up with Surgery outpatient     Acute on chronic respiratory failure  Advanced COPD  Tobacco dependence  --- Has been on 4 L, almost at baseline 3-4L NC at home  --- Has been on oral taper of steroids for her COPD  --- prednisone taper  --- PTA ICS/LABA  ---  "Pulmonology signed off  --- encourage smoking cessation     Paroxysmal A-fib/a flutter; status post PPM  Aortic stenosis status post TAVR  Acute on chronic HFpEF  --- Previously  on Cardizem drip at   --- Currently on metoprolol and diltiazem and digoxin with hold parameters  --- She is off amiodarone due to QTc prolongation  --- PTA lasix 40mg  --- On Eliquis  --- Cardiology signed off 8/15  --echo 4/2024 with EF 55-60%   --- monitor on telemetry   --- mag ok     Deconditioning   --- Previously recommended to go to TCU and has bed there.  ---PT/OT recommend TCU     Normocytic anemia; overall stable     Hyperlipidemia--statin ordered     Diabetes mellitus, type II  --- Not on home meds  --- Last A1c 6.2  --- Continue insulin sliding scale     Sacral erythema  Berna anal skin breakdown  Calmoseptine          Diet: Fluid restriction 1800 ML FLUID  Regular Diet Adult    DVT Prophylaxis: DOAC  Chairez Catheter: Not present  Lines: PRESENT      PICC 08/10/24 Triple Lumen Right Brachial vein medial-Site Assessment: WDL      Cardiac Monitoring: None  Code Status: Full Code      Clinically Significant Risk Factors            # Hypomagnesemia: Lowest Mg = 1.6 mg/dL in last 2 days, will replace as needed   # Hypoalbuminemia: Lowest albumin = 2.2 g/dL at 8/18/2024  7:04 AM, will monitor as appropriate     # Hypertension: Noted on problem list           # Overweight: Estimated body mass index is 25.53 kg/m  as calculated from the following:    Height as of 8/10/24: 1.651 m (5' 5\").    Weight as of this encounter: 69.6 kg (153 lb 7 oz).      # Financial/Environmental Concerns: none   # Pacemaker present             Disposition Plan     Medically Ready for Discharge: Anticipated Tomorrow             Lauren Willson MD  Hospitalist Service  [facility]  Securely message with [email or web address] (more info)  Text page via [email or web address] Paging/Directory "   ______________________________________________________________________    Interval History   Patient examined at the bedside, chest tube out, pain slightly getting better. States she feels terrible overall and tired  Will re-evaluate tomorrow, pending TCU placement    Physical Exam   Vital Signs: Temp: 98  F (36.7  C) Temp src: Oral BP: 106/62 Pulse: 86   Resp: 16 SpO2: 90 % O2 Device: Nasal cannula Oxygen Delivery: 4 LPM  Weight: 153 lbs 7.04 oz    General Appearance:  Pleasant frail, NAD, on 4 LPM NC  Respiratory: Relatively clear, mild rhonchi anteriorly.  Chest tube removed  Cardiovascular: RRR  GI: Soft and nontender without hepatosplenomegaly, masses palpable.  Skin: no significant lower extremity edema.  Frail skin with senile purpura and tattoos noted without open areas.  Other:  neuro grossly intact without focal deficits appreciated    Medical Decision Making       50 MINUTES SPENT BY ME on the date of service doing chart review, history, exam, documentation & further activities per the note.      Data     I have personally reviewed the following data over the past 24 hrs:    N/A  \   N/A   / N/A     143 101 10.4 /  264 (H)   3.5 37 (H) 0.47 (L) \       Imaging results reviewed over the past 24 hrs:   Recent Results (from the past 24 hour(s))   XR Chest Port 1 View    Narrative    EXAM: XR CHEST PORT 1 VIEW  LOCATION: [facility]  DATE: 8/26/2024    INDICATION: Recheck Chest tube, s p VATS 8 19, right lung re expansion with pulmonary toileting  COMPARISON: 8/25/2024.      Impression    IMPRESSION: The heart is unchanged in appearance. The right PICC and dual-lead pacing device as well as the cardiac valvular prosthesis are all unchanged. The lungs are otherwise unchanged appearance

## 2024-08-26 NOTE — PROGRESS NOTES
"Care Management Follow Up    Length of Stay (days): 8    Expected Discharge Date: 08/26/2024     Concerns to be Addressed: Care progression - discharge planning     Patient plan of care discussed at interdisciplinary rounds: Yes    Anticipated Discharge Disposition:  Transitional care      Anticipated Discharge Services:  Transitional care   Anticipated Discharge DME:  NA    Patient/family educated on Medicare website which has current facility and service quality ratings:  Yes  Education Provided on the Discharge Plan:  Yes per team  Patient/Family in Agreement with the Plan:  Yes    Referrals Placed by CM/SW:  Yes  Private pay costs discussed: Not applicable    Additional Information:  7511 called [facility] and left a message for Talia to return call.     Social Hx: \"Transferred from . Reside in a home of her son/daughter-in-law Cynthia, who is pt's PCA. Family assists w/I/ADL. Clinically accepted at [facility]. Need PAS. Family to transport.\"     RNCM to follow for medical progression, recommendations, and final discharge plan.     Ana Buitrago RN     [id number] Talia called and said she has the referral, but have not review. She will review and return call.    [phone] called patient's son, Heriberto, to update on the TCU, [facility] declined.  "

## 2024-08-26 NOTE — PLAN OF CARE
Problem: Adult Inpatient Plan of Care  Goal: Optimal Comfort and Wellbeing  Outcome: Progressing  Problem: Comorbidity Management  Goal: Blood Glucose Levels Within Targeted Range  Outcome: Progressing     AO, VSS on 4L NC. Back pain controlled with tylenol alongside scheduled meds, per pt request. Previous chest port dressing CDI. PICC flushes well with good blood return. IV Abx continued this shift. Up w/ assist 1 GBW, deconditioned. 600mL of 1800 fluid restriction utilized this shift. No acute events reported at this time.    Kevin Santa RN   Dr. Judith León

## 2024-08-26 NOTE — PLAN OF CARE
Problem: Gas Exchange Impaired  Goal: Optimal Gas Exchange  Outcome: Progressing  Intervention: Optimize Oxygenation and Ventilation  Recent Flowsheet Documentation  Taken 8/25/2024 1630 by Francy Benton RN  Head of Bed (HOB) Positioning: HOB at 30 degrees     Problem: Comorbidity Management  Goal: Blood Glucose Levels Within Targeted Range  Intervention: Monitor and Manage Glycemia  Recent Flowsheet Documentation  Taken 8/25/2024 1630 by Francy Benton RN  Glycemic Management: blood glucose monitored  Medication Review/Management: medications reviewed     Problem: Adult Inpatient Plan of Care  Goal: Optimal Comfort and Wellbeing  Intervention: Monitor Pain and Promote Comfort  Recent Flowsheet Documentation  Taken 8/25/2024 1630 by Francy Benton RN  Pain Management Interventions: medication offered but refused   Goal Outcome Evaluation:  Patient spent a quiet evening. Alert and oriented x 4. Patient rating pain 7-8/10. Declined prn tramadol. Prn tylenol utilized. Patient had large bowel movement. Blood sugar 250 at supper time and 174 at bedtime. Received insulin coverage per sliding scale. Patient declined supper, stating ate late lunch. Patient refused Biotene artificial saliva.

## 2024-08-27 ENCOUNTER — APPOINTMENT (OUTPATIENT)
Dept: RADIOLOGY | Facility: HOSPITAL | Age: 74
DRG: 166 | End: 2024-08-27
Payer: COMMERCIAL

## 2024-08-27 LAB
ANION GAP SERPL CALCULATED.3IONS-SCNC: 4 MMOL/L (ref 7–15)
BUN SERPL-MCNC: 12.2 MG/DL (ref 8–23)
CALCIUM SERPL-MCNC: 8.4 MG/DL (ref 8.8–10.4)
CHLORIDE SERPL-SCNC: 99 MMOL/L (ref 98–107)
CREAT SERPL-MCNC: 0.46 MG/DL (ref 0.51–0.95)
EGFRCR SERPLBLD CKD-EPI 2021: >90 ML/MIN/1.73M2
GLUCOSE BLDC GLUCOMTR-MCNC: 111 MG/DL (ref 70–99)
GLUCOSE BLDC GLUCOMTR-MCNC: 134 MG/DL (ref 70–99)
GLUCOSE BLDC GLUCOMTR-MCNC: 176 MG/DL (ref 70–99)
GLUCOSE BLDC GLUCOMTR-MCNC: 192 MG/DL (ref 70–99)
GLUCOSE BLDC GLUCOMTR-MCNC: 269 MG/DL (ref 70–99)
GLUCOSE SERPL-MCNC: 97 MG/DL (ref 70–99)
HCO3 SERPL-SCNC: 40 MMOL/L (ref 22–29)
MAGNESIUM SERPL-MCNC: 1.6 MG/DL (ref 1.7–2.3)
POTASSIUM SERPL-SCNC: 3.5 MMOL/L (ref 3.4–5.3)
SODIUM SERPL-SCNC: 143 MMOL/L (ref 135–145)

## 2024-08-27 PROCEDURE — 250N000013 HC RX MED GY IP 250 OP 250 PS 637: Performed by: SPECIALIST

## 2024-08-27 PROCEDURE — 250N000011 HC RX IP 250 OP 636: Performed by: INTERNAL MEDICINE

## 2024-08-27 PROCEDURE — 83735 ASSAY OF MAGNESIUM: CPT | Performed by: STUDENT IN AN ORGANIZED HEALTH CARE EDUCATION/TRAINING PROGRAM

## 2024-08-27 PROCEDURE — 250N000011 HC RX IP 250 OP 636: Performed by: SPECIALIST

## 2024-08-27 PROCEDURE — 250N000013 HC RX MED GY IP 250 OP 250 PS 637: Performed by: STUDENT IN AN ORGANIZED HEALTH CARE EDUCATION/TRAINING PROGRAM

## 2024-08-27 PROCEDURE — 120N000001 HC R&B MED SURG/OB

## 2024-08-27 PROCEDURE — 250N000009 HC RX 250: Performed by: INTERNAL MEDICINE

## 2024-08-27 PROCEDURE — 99232 SBSQ HOSP IP/OBS MODERATE 35: CPT | Performed by: STUDENT IN AN ORGANIZED HEALTH CARE EDUCATION/TRAINING PROGRAM

## 2024-08-27 PROCEDURE — 250N000009 HC RX 250

## 2024-08-27 PROCEDURE — 94640 AIRWAY INHALATION TREATMENT: CPT

## 2024-08-27 PROCEDURE — 71045 X-RAY EXAM CHEST 1 VIEW: CPT

## 2024-08-27 PROCEDURE — 80048 BASIC METABOLIC PNL TOTAL CA: CPT | Performed by: SPECIALIST

## 2024-08-27 PROCEDURE — 94640 AIRWAY INHALATION TREATMENT: CPT | Mod: 76

## 2024-08-27 PROCEDURE — 250N000013 HC RX MED GY IP 250 OP 250 PS 637: Performed by: INTERNAL MEDICINE

## 2024-08-27 PROCEDURE — 250N000012 HC RX MED GY IP 250 OP 636 PS 637: Performed by: FAMILY MEDICINE

## 2024-08-27 PROCEDURE — 999N000157 HC STATISTIC RCP TIME EA 10 MIN

## 2024-08-27 PROCEDURE — 250N000013 HC RX MED GY IP 250 OP 250 PS 637: Performed by: FAMILY MEDICINE

## 2024-08-27 PROCEDURE — 99207 PR NO CHARGE LOS: CPT | Performed by: INTERNAL MEDICINE

## 2024-08-27 RX ORDER — MAGNESIUM HYDROXIDE/ALUMINUM HYDROXICE/SIMETHICONE 120; 1200; 1200 MG/30ML; MG/30ML; MG/30ML
30 SUSPENSION ORAL EVERY 4 HOURS PRN
Status: DISCONTINUED | OUTPATIENT
Start: 2024-08-27 | End: 2024-09-09 | Stop reason: HOSPADM

## 2024-08-27 RX ORDER — CEFTRIAXONE 2 G/1
2 INJECTION, POWDER, FOR SOLUTION INTRAMUSCULAR; INTRAVENOUS EVERY 24 HOURS
DISCHARGE
Start: 2024-08-28 | End: 2024-09-08

## 2024-08-27 RX ADMIN — IPRATROPIUM BROMIDE 0.5 MG: 0.5 SOLUTION RESPIRATORY (INHALATION) at 16:41

## 2024-08-27 RX ADMIN — Medication 1 SPRAY: at 09:24

## 2024-08-27 RX ADMIN — FLUTICASONE FUROATE AND VILANTEROL TRIFENATATE 1 PUFF: 200; 25 POWDER RESPIRATORY (INHALATION) at 09:24

## 2024-08-27 RX ADMIN — SENNOSIDES AND DOCUSATE SODIUM 1 TABLET: 8.6; 5 TABLET ORAL at 21:18

## 2024-08-27 RX ADMIN — SUCRALFATE 1 G: 1 TABLET ORAL at 06:54

## 2024-08-27 RX ADMIN — ANORECTAL OINTMENT: 15.7; .44; 24; 20.6 OINTMENT TOPICAL at 09:24

## 2024-08-27 RX ADMIN — GABAPENTIN 600 MG: 300 CAPSULE ORAL at 13:02

## 2024-08-27 RX ADMIN — SUCRALFATE 1 G: 1 TABLET ORAL at 12:53

## 2024-08-27 RX ADMIN — SUCRALFATE 1 G: 1 TABLET ORAL at 17:34

## 2024-08-27 RX ADMIN — GABAPENTIN 600 MG: 300 CAPSULE ORAL at 09:23

## 2024-08-27 RX ADMIN — FLUCONAZOLE 200 MG: 2 INJECTION, SOLUTION INTRAVENOUS at 19:34

## 2024-08-27 RX ADMIN — CEFTRIAXONE SODIUM 2 G: 2 INJECTION, POWDER, FOR SOLUTION INTRAMUSCULAR; INTRAVENOUS at 12:53

## 2024-08-27 RX ADMIN — FUROSEMIDE 40 MG: 20 TABLET ORAL at 09:23

## 2024-08-27 RX ADMIN — LEVALBUTEROL HYDROCHLORIDE 1.25 MG: 1.25 SOLUTION RESPIRATORY (INHALATION) at 07:41

## 2024-08-27 RX ADMIN — LIDOCAINE 1 PATCH: 4 PATCH TOPICAL at 21:16

## 2024-08-27 RX ADMIN — SENNOSIDES AND DOCUSATE SODIUM 1 TABLET: 8.6; 5 TABLET ORAL at 09:23

## 2024-08-27 RX ADMIN — Medication 1 SPRAY: at 12:53

## 2024-08-27 RX ADMIN — DILTIAZEM HYDROCHLORIDE 120 MG: 120 CAPSULE, EXTENDED RELEASE ORAL at 12:53

## 2024-08-27 RX ADMIN — ATORVASTATIN CALCIUM 20 MG: 10 TABLET, FILM COATED ORAL at 21:18

## 2024-08-27 RX ADMIN — METOPROLOL TARTRATE 50 MG: 25 TABLET, FILM COATED ORAL at 09:23

## 2024-08-27 RX ADMIN — SODIUM CHLORIDE SOLN NEBU 3% 3 ML: 3 NEBU SOLN at 20:21

## 2024-08-27 RX ADMIN — LEVALBUTEROL HYDROCHLORIDE 1.25 MG: 1.25 SOLUTION RESPIRATORY (INHALATION) at 16:41

## 2024-08-27 RX ADMIN — SODIUM CHLORIDE SOLN NEBU 3% 3 ML: 3 NEBU SOLN at 13:15

## 2024-08-27 RX ADMIN — LEVALBUTEROL HYDROCHLORIDE 1.25 MG: 1.25 SOLUTION RESPIRATORY (INHALATION) at 20:19

## 2024-08-27 RX ADMIN — LEVALBUTEROL HYDROCHLORIDE 1.25 MG: 1.25 SOLUTION RESPIRATORY (INHALATION) at 13:13

## 2024-08-27 RX ADMIN — DIGOXIN 125 MCG: 125 TABLET ORAL at 09:23

## 2024-08-27 RX ADMIN — APIXABAN 5 MG: 5 TABLET, FILM COATED ORAL at 21:18

## 2024-08-27 RX ADMIN — PANTOPRAZOLE SODIUM 40 MG: 40 TABLET, DELAYED RELEASE ORAL at 06:54

## 2024-08-27 RX ADMIN — Medication 1 SPRAY: at 17:34

## 2024-08-27 RX ADMIN — PREDNISONE 10 MG: 5 TABLET ORAL at 09:23

## 2024-08-27 RX ADMIN — IPRATROPIUM BROMIDE 0.5 MG: 0.5 SOLUTION RESPIRATORY (INHALATION) at 13:14

## 2024-08-27 RX ADMIN — GABAPENTIN 600 MG: 300 CAPSULE ORAL at 21:18

## 2024-08-27 RX ADMIN — APIXABAN 5 MG: 5 TABLET, FILM COATED ORAL at 09:23

## 2024-08-27 RX ADMIN — IPRATROPIUM BROMIDE 0.5 MG: 0.5 SOLUTION RESPIRATORY (INHALATION) at 07:41

## 2024-08-27 RX ADMIN — SODIUM CHLORIDE SOLN NEBU 3% 3 ML: 3 NEBU SOLN at 07:41

## 2024-08-27 RX ADMIN — SODIUM CHLORIDE SOLN NEBU 3% 3 ML: 3 NEBU SOLN at 16:42

## 2024-08-27 RX ADMIN — IPRATROPIUM BROMIDE 0.5 MG: 0.5 SOLUTION RESPIRATORY (INHALATION) at 20:20

## 2024-08-27 ASSESSMENT — ACTIVITIES OF DAILY LIVING (ADL)
ADLS_ACUITY_SCORE: 42

## 2024-08-27 NOTE — PLAN OF CARE
Problem: Gas Exchange Impaired  Goal: Optimal Gas Exchange  Outcome: Progressing     Problem: Skin Injury Risk Increased  Goal: Skin Health and Integrity  Outcome: Progressing  Intervention: Plan: Nurse Driven Intervention: Moisture Management  Recent Flowsheet Documentation  Taken 8/27/2024 1430 by Rayo Deutsch RN  Bathing/Skin Care: wipes, CHG  Taken 8/27/2024 1200 by Rayo Deutsch RN  Bathing/Skin Care: linen changed  Taken 8/27/2024 0915 by Rayo Deutsch RN  Moisture Interventions:   Encourage regular toileting   Incontinence pad   Urinary collection device  Plan: Moisture Management:   incontinence pad   urinary collection device  Intervention: Plan: Nurse Driven Intervention: Friction and Shear  Recent Flowsheet Documentation  Taken 8/27/2024 0915 by Rayo Deutsch RN  Friction/Shear Interventions: HOB 30 degrees or less  Intervention: Optimize Skin Protection  Recent Flowsheet Documentation  Taken 8/27/2024 0915 by Rayo Deutsch RN  Activity Management: activity adjusted per tolerance   Goal Outcome Evaluation:       VSS on 4L NC (baseline). A&Ox4. Slight abdominal pain managed w/ rest & repo. Up w/ 2 pivot, in chair for meals. Former CT site dressing removed, CDI, now open to air. R PICC w/ blood return in all 3 lumens. IV abx given per order. K & mag protocols OK, recheck w/ am labs. Had multiple continent BMs today.

## 2024-08-27 NOTE — PROGRESS NOTES
"Care Management Follow Up    Length of Stay (days): 9    Expected Discharge Date: 08/27/2024     Concerns to be Addressed: Care progression - discharge planning     Patient plan of care discussed at interdisciplinary rounds: Yes    Anticipated Discharge Disposition:  Transitional care      Anticipated Discharge Services:  Transitional care   Anticipated Discharge DME:  NA    Patient/family educated on Medicare website which has current facility and service quality ratings:  Yes  Education Provided on the Discharge Plan:  Yes per team  Patient/Family in Agreement with the Plan:  Yes    Referrals Placed by CM/SW:  Yes  Private pay costs discussed: Transportation costs    Additional Information:  [phone] called and spoke with Destinee at [facility]. Patient OK to come today by 1730 if med ready.    Message sent to Dr. Willson    Social Hx: \"Transferred from . Reside in a home of her son/daughter-in-law Cynthia, who is pt's PCA. Family assists w/I/ADL. Accepted at [facility]. PAS completed. Family to transport.\"     RNCM to follow for medical progression, recommendations, and final discharge plan.     Ana Buitrago RN     Per provider, Dr. Willson, patient is not ready. Will keep for another day.    Message sent to update Destinee    Met with patient at bedside to discuss the TCU. Patient prefer [facility].  Patient is not sure about the transport. She will talk with her son and update RNCM.  Discussed out of pocket cost of [facility] medical transportation by wheelchair/stretcher with patient and family member, Heriberto.     Called patient's son, Heriberto to discuss the above. He states family will discuss and update.   "

## 2024-08-27 NOTE — PLAN OF CARE
Patient is A&Ox4. She is cooperative with cares. Patient does have some pain but declines pain medicine. No drainage from chest tube sites. Patient remains on 4L O2 via [place]  BG's 269 and 192. Coverage given for AC meal.    Problem: Adult Inpatient Plan of Care  Goal: Absence of Hospital-Acquired Illness or Injury  Outcome: Progressing  Goal: Optimal Comfort and Wellbeing  Outcome: Progressing     Problem: Gas Exchange Impaired  Goal: Optimal Gas Exchange  Outcome: Progressing     Problem: Comorbidity Management  Goal: Maintenance of COPD Symptom Control  Outcome: Progressing  Goal: Blood Pressure in Desired Range  Outcome: Progressing     Problem: Risk for Delirium  Goal: Optimal Coping  Outcome: Progressing   Goal Outcome Evaluation:

## 2024-08-27 NOTE — PLAN OF CARE
Problem: Gas Exchange Impaired  Goal: Optimal Gas Exchange  Outcome: Progressing  Intervention: Optimize Oxygenation and Ventilation  Recent Flowsheet Documentation  Taken 8/27/2024 0055 by Emma Nam RN  Head of Bed ([facility]) Positioning: HOB at 30 degrees  Taken 8/27/2024 0049 by Emma Nam RN  Head of Bed ([facility]) Positioning: HOB at 20-30 degrees     Problem: Comorbidity Management  Goal: Blood Glucose Levels Within Targeted Range  Outcome: Progressing  Intervention: Monitor and Manage Glycemia  Recent Flowsheet Documentation  Taken 8/27/2024 0055 by Emma Nam RN  Glycemic Management: blood glucose monitored  Medication Review/Management: medications reviewed     Problem: Comorbidity Management  Goal: Blood Pressure in Desired Range  Outcome: Progressing  Intervention: Maintain Blood Pressure Management  Recent Flowsheet Documentation  Taken 8/27/2024 0055 by Emma Nam RN  Medication Review/Management: medications reviewed     Problem: Risk for Delirium  Goal: Improved Sleep  Outcome: Progressing     Problem: Respiratory Compromise (Pneumothorax)  Goal: Optimal Oxygenation and Ventilation  Outcome: Progressing     Goal Outcome Evaluation:  Pt is A&Ox4. VSS stable on 4L O2 nasal cannula. Chest tube site pressure dressing C/D/I. Purewick in place. Triple lumen PICC flushed and cap changed. Mg & K+ protocol, AM check. Pain was tolerable over night, no pain meds given.

## 2024-08-27 NOTE — PROGRESS NOTES
ID brief visit    Will write for ceftriaxone, follow up, labs.     Please call if questions.     Ravindra Bacon MD

## 2024-08-27 NOTE — PLAN OF CARE
Patient alert and oriented this shift.  Stated her pain was very minimal this shift. Just had schedule tylenol and gabapentin which was effective.  IV fluconazole given as ordered.  Vitals stable.  Erika Estrella RN     Problem: Adult Inpatient Plan of Care  Goal: Absence of Hospital-Acquired Illness or Injury  Outcome: Progressing  Intervention: Identify and Manage Fall Risk  Recent Flowsheet Documentation  Taken 8/26/2024 1700 by Erika Estrella RN  Safety Promotion/Fall Prevention:   supervised activity   safety round/check completed   room organization consistent   room near nurse's station   room door open   nonskid shoes/slippers when out of bed   clutter free environment maintained  Intervention: Prevent Infection  Recent Flowsheet Documentation  Taken 8/26/2024 1700 by Erika Estrella RN  Infection Prevention:   hand hygiene promoted   rest/sleep promoted     Problem: Comorbidity Management  Goal: Maintenance of COPD Symptom Control  Outcome: Progressing  Intervention: Maintain COPD Symptom Control  Recent Flowsheet Documentation  Taken 8/26/2024 1700 by Erika Estrella RN  Medication Review/Management: medications reviewed  Goal: Blood Glucose Levels Within Targeted Range  Outcome: Progressing  Intervention: Monitor and Manage Glycemia  Recent Flowsheet Documentation  Taken 8/26/2024 1700 by Erika Estrella RN  Medication Review/Management: medications reviewed  Goal: Blood Pressure in Desired Range  Outcome: Progressing  Intervention: Maintain Blood Pressure Management  Recent Flowsheet Documentation  Taken 8/26/2024 1700 by Erika Estrella RN  Medication Review/Management: medications reviewed     Problem: Risk for Delirium  Goal: Optimal Coping  Outcome: Progressing  Goal: Improved Behavioral Control  Outcome: Progressing  Intervention: Minimize Safety Risk  Recent Flowsheet Documentation  Taken 8/26/2024 1700 by Erika Estrella RN  Enhanced Safety Measures:   pain  management   room near unit station  Goal: Improved Attention and Thought Clarity  Outcome: Progressing  Goal: Improved Sleep  Outcome: Progressing     Problem: Skin Injury Risk Increased  Goal: Skin Health and Integrity  Outcome: Progressing  Intervention: Plan: Nurse Driven Intervention: Moisture Management  Recent Flowsheet Documentation  Taken 8/26/2024 1700 by Erika Estrella RN  Moisture Interventions:   Urinary collection device   Incontinence pad  Intervention: Plan: Nurse Driven Intervention: Friction and Shear  Recent Flowsheet Documentation  Taken 8/26/2024 1700 by Erika Estrella RN  Friction/Shear Interventions: HOB 30 degrees or less   Goal Outcome Evaluation:

## 2024-08-28 ENCOUNTER — APPOINTMENT (OUTPATIENT)
Dept: OCCUPATIONAL THERAPY | Facility: HOSPITAL | Age: 74
DRG: 166 | End: 2024-08-28
Attending: FAMILY MEDICINE
Payer: COMMERCIAL

## 2024-08-28 ENCOUNTER — APPOINTMENT (OUTPATIENT)
Dept: PHYSICAL THERAPY | Facility: HOSPITAL | Age: 74
DRG: 166 | End: 2024-08-28
Attending: FAMILY MEDICINE
Payer: COMMERCIAL

## 2024-08-28 LAB
ANION GAP SERPL CALCULATED.3IONS-SCNC: 4 MMOL/L (ref 7–15)
BUN SERPL-MCNC: 11.3 MG/DL (ref 8–23)
CALCIUM SERPL-MCNC: 8.2 MG/DL (ref 8.8–10.4)
CHLORIDE SERPL-SCNC: 97 MMOL/L (ref 98–107)
CREAT SERPL-MCNC: 0.46 MG/DL (ref 0.51–0.95)
EGFRCR SERPLBLD CKD-EPI 2021: >90 ML/MIN/1.73M2
GLUCOSE BLDC GLUCOMTR-MCNC: 112 MG/DL (ref 70–99)
GLUCOSE BLDC GLUCOMTR-MCNC: 118 MG/DL (ref 70–99)
GLUCOSE BLDC GLUCOMTR-MCNC: 182 MG/DL (ref 70–99)
GLUCOSE BLDC GLUCOMTR-MCNC: 212 MG/DL (ref 70–99)
GLUCOSE BLDC GLUCOMTR-MCNC: 216 MG/DL (ref 70–99)
GLUCOSE SERPL-MCNC: 122 MG/DL (ref 70–99)
HCO3 SERPL-SCNC: 42 MMOL/L (ref 22–29)
MAGNESIUM SERPL-MCNC: 1.6 MG/DL (ref 1.7–2.3)
PLATELET # BLD AUTO: 156 10E3/UL (ref 150–450)
POTASSIUM SERPL-SCNC: 3.4 MMOL/L (ref 3.4–5.3)
POTASSIUM SERPL-SCNC: 4 MMOL/L (ref 3.4–5.3)
SODIUM SERPL-SCNC: 143 MMOL/L (ref 135–145)

## 2024-08-28 PROCEDURE — 250N000013 HC RX MED GY IP 250 OP 250 PS 637: Performed by: STUDENT IN AN ORGANIZED HEALTH CARE EDUCATION/TRAINING PROGRAM

## 2024-08-28 PROCEDURE — 999N000157 HC STATISTIC RCP TIME EA 10 MIN

## 2024-08-28 PROCEDURE — 85049 AUTOMATED PLATELET COUNT: CPT | Performed by: SPECIALIST

## 2024-08-28 PROCEDURE — 84132 ASSAY OF SERUM POTASSIUM: CPT | Performed by: STUDENT IN AN ORGANIZED HEALTH CARE EDUCATION/TRAINING PROGRAM

## 2024-08-28 PROCEDURE — 94640 AIRWAY INHALATION TREATMENT: CPT

## 2024-08-28 PROCEDURE — 250N000009 HC RX 250: Performed by: INTERNAL MEDICINE

## 2024-08-28 PROCEDURE — 99233 SBSQ HOSP IP/OBS HIGH 50: CPT | Performed by: STUDENT IN AN ORGANIZED HEALTH CARE EDUCATION/TRAINING PROGRAM

## 2024-08-28 PROCEDURE — 250N000013 HC RX MED GY IP 250 OP 250 PS 637: Performed by: SPECIALIST

## 2024-08-28 PROCEDURE — 250N000009 HC RX 250

## 2024-08-28 PROCEDURE — 250N000013 HC RX MED GY IP 250 OP 250 PS 637: Performed by: INTERNAL MEDICINE

## 2024-08-28 PROCEDURE — 250N000013 HC RX MED GY IP 250 OP 250 PS 637: Performed by: FAMILY MEDICINE

## 2024-08-28 PROCEDURE — 83735 ASSAY OF MAGNESIUM: CPT | Performed by: STUDENT IN AN ORGANIZED HEALTH CARE EDUCATION/TRAINING PROGRAM

## 2024-08-28 PROCEDURE — 94640 AIRWAY INHALATION TREATMENT: CPT | Mod: 76

## 2024-08-28 PROCEDURE — 120N000001 HC R&B MED SURG/OB

## 2024-08-28 PROCEDURE — 250N000011 HC RX IP 250 OP 636: Performed by: SPECIALIST

## 2024-08-28 PROCEDURE — 999N000156 HC STATISTIC RCP CONSULT EA 30 MIN

## 2024-08-28 PROCEDURE — 258N000003 HC RX IP 258 OP 636: Performed by: STUDENT IN AN ORGANIZED HEALTH CARE EDUCATION/TRAINING PROGRAM

## 2024-08-28 PROCEDURE — 97535 SELF CARE MNGMENT TRAINING: CPT | Mod: GO

## 2024-08-28 PROCEDURE — 250N000012 HC RX MED GY IP 250 OP 636 PS 637: Performed by: FAMILY MEDICINE

## 2024-08-28 PROCEDURE — 250N000011 HC RX IP 250 OP 636: Performed by: INTERNAL MEDICINE

## 2024-08-28 PROCEDURE — 97110 THERAPEUTIC EXERCISES: CPT | Mod: GP

## 2024-08-28 PROCEDURE — 80048 BASIC METABOLIC PNL TOTAL CA: CPT | Performed by: SPECIALIST

## 2024-08-28 RX ORDER — POTASSIUM CHLORIDE 1500 MG/1
40 TABLET, EXTENDED RELEASE ORAL ONCE
Status: COMPLETED | OUTPATIENT
Start: 2024-08-28 | End: 2024-08-28

## 2024-08-28 RX ORDER — SODIUM CHLORIDE 9 MG/ML
INJECTION, SOLUTION INTRAVENOUS CONTINUOUS
Status: ACTIVE | OUTPATIENT
Start: 2024-08-28 | End: 2024-08-28

## 2024-08-28 RX ADMIN — SODIUM CHLORIDE SOLN NEBU 3% 3 ML: 3 NEBU SOLN at 20:32

## 2024-08-28 RX ADMIN — ANORECTAL OINTMENT: 15.7; .44; 24; 20.6 OINTMENT TOPICAL at 18:32

## 2024-08-28 RX ADMIN — CEFTRIAXONE SODIUM 2 G: 2 INJECTION, POWDER, FOR SOLUTION INTRAMUSCULAR; INTRAVENOUS at 13:11

## 2024-08-28 RX ADMIN — Medication 1 SPRAY: at 08:02

## 2024-08-28 RX ADMIN — SENNOSIDES AND DOCUSATE SODIUM 1 TABLET: 8.6; 5 TABLET ORAL at 08:00

## 2024-08-28 RX ADMIN — IPRATROPIUM BROMIDE 0.5 MG: 0.5 SOLUTION RESPIRATORY (INHALATION) at 12:46

## 2024-08-28 RX ADMIN — FLUTICASONE FUROATE AND VILANTEROL TRIFENATATE 1 PUFF: 200; 25 POWDER RESPIRATORY (INHALATION) at 08:03

## 2024-08-28 RX ADMIN — SODIUM CHLORIDE SOLN NEBU 3% 3 ML: 3 NEBU SOLN at 12:46

## 2024-08-28 RX ADMIN — GABAPENTIN 600 MG: 300 CAPSULE ORAL at 13:11

## 2024-08-28 RX ADMIN — LEVALBUTEROL HYDROCHLORIDE 1.25 MG: 1.25 SOLUTION RESPIRATORY (INHALATION) at 16:24

## 2024-08-28 RX ADMIN — APIXABAN 5 MG: 5 TABLET, FILM COATED ORAL at 21:02

## 2024-08-28 RX ADMIN — INSULIN ASPART 1 UNITS: 100 INJECTION, SOLUTION INTRAVENOUS; SUBCUTANEOUS at 21:33

## 2024-08-28 RX ADMIN — SODIUM CHLORIDE SOLN NEBU 3% 3 ML: 3 NEBU SOLN at 08:35

## 2024-08-28 RX ADMIN — GABAPENTIN 600 MG: 300 CAPSULE ORAL at 08:01

## 2024-08-28 RX ADMIN — PREDNISONE 10 MG: 5 TABLET ORAL at 08:00

## 2024-08-28 RX ADMIN — IPRATROPIUM BROMIDE 0.5 MG: 0.5 SOLUTION RESPIRATORY (INHALATION) at 16:25

## 2024-08-28 RX ADMIN — IPRATROPIUM BROMIDE 0.5 MG: 0.5 SOLUTION RESPIRATORY (INHALATION) at 08:35

## 2024-08-28 RX ADMIN — SUCRALFATE 1 G: 1 TABLET ORAL at 07:53

## 2024-08-28 RX ADMIN — DILTIAZEM HYDROCHLORIDE 120 MG: 120 CAPSULE, EXTENDED RELEASE ORAL at 11:35

## 2024-08-28 RX ADMIN — METOPROLOL TARTRATE 50 MG: 25 TABLET, FILM COATED ORAL at 08:01

## 2024-08-28 RX ADMIN — POLYETHYLENE GLYCOL 3350 17 G: 17 POWDER, FOR SOLUTION ORAL at 08:03

## 2024-08-28 RX ADMIN — PANTOPRAZOLE SODIUM 40 MG: 40 TABLET, DELAYED RELEASE ORAL at 07:53

## 2024-08-28 RX ADMIN — METOPROLOL TARTRATE 50 MG: 25 TABLET, FILM COATED ORAL at 21:02

## 2024-08-28 RX ADMIN — IPRATROPIUM BROMIDE 0.5 MG: 0.5 SOLUTION RESPIRATORY (INHALATION) at 20:32

## 2024-08-28 RX ADMIN — LEVALBUTEROL HYDROCHLORIDE 1.25 MG: 1.25 SOLUTION RESPIRATORY (INHALATION) at 08:35

## 2024-08-28 RX ADMIN — ATORVASTATIN CALCIUM 20 MG: 10 TABLET, FILM COATED ORAL at 21:01

## 2024-08-28 RX ADMIN — LIDOCAINE 1 PATCH: 4 PATCH TOPICAL at 21:02

## 2024-08-28 RX ADMIN — DIGOXIN 125 MCG: 125 TABLET ORAL at 08:04

## 2024-08-28 RX ADMIN — FUROSEMIDE 40 MG: 20 TABLET ORAL at 08:00

## 2024-08-28 RX ADMIN — LEVALBUTEROL HYDROCHLORIDE 1.25 MG: 1.25 SOLUTION RESPIRATORY (INHALATION) at 20:32

## 2024-08-28 RX ADMIN — SUCRALFATE 1 G: 1 TABLET ORAL at 17:09

## 2024-08-28 RX ADMIN — LEVALBUTEROL HYDROCHLORIDE 1.25 MG: 1.25 SOLUTION RESPIRATORY (INHALATION) at 12:46

## 2024-08-28 RX ADMIN — GABAPENTIN 600 MG: 300 CAPSULE ORAL at 21:02

## 2024-08-28 RX ADMIN — SUCRALFATE 1 G: 1 TABLET ORAL at 11:35

## 2024-08-28 RX ADMIN — SODIUM CHLORIDE: 9 INJECTION, SOLUTION INTRAVENOUS at 13:20

## 2024-08-28 RX ADMIN — POTASSIUM CHLORIDE 40 MEQ: 1500 TABLET, EXTENDED RELEASE ORAL at 08:00

## 2024-08-28 RX ADMIN — SODIUM CHLORIDE SOLN NEBU 3% 3 ML: 3 NEBU SOLN at 16:25

## 2024-08-28 RX ADMIN — APIXABAN 5 MG: 5 TABLET, FILM COATED ORAL at 08:00

## 2024-08-28 RX ADMIN — FLUCONAZOLE 200 MG: 2 INJECTION, SOLUTION INTRAVENOUS at 18:32

## 2024-08-28 ASSESSMENT — ACTIVITIES OF DAILY LIVING (ADL)
ADLS_ACUITY_SCORE: 42

## 2024-08-28 NOTE — PROGRESS NOTES
[facility]    Medicine Progress Note - Hospitalist Service    Date of Admission:  8/17/2024    Assessment & Plan   74-year-old female with a past medical history of a flutter, COPD, ongoing tobacco abuse, previous pleural effusions and chronic pain admitted to the hospital with empyema; transferred from [facility] to [facility] for surgical management.     Empyema  --- s/p VATS - per surgery no true emphyema or rind found - no ability to expand lung  --- Repeat CT 08/20 shows pasty right pleural effusion s/p chest tube placement, moderate likely loculated/complicated residual as well as small associated right pneumothorax  Reexpansion of right upper lobe with persistent collapse of right middle and lower lobe segments.  Significant retained secretions greater in the right lower lobe. Increasing left pleural effusion  ---Plan was for bronchoscopy 08/21, not done as patient was requiring upto 10L oxygen, due to risk of being intubated  ---Continue Xopenex + ipratropium, hypertonic saline. Volera - did not tolerate, Using flutter valve  --- having daily CXR   --- General surgery and pulmonology following  --- Chest tube removed today 08/25, repeat CXR no evidence of pneumothorax  --- first bronchoscopy 8/11 cultures showing E. coli and Candida albicans with pleural effusion cultures showing strep pneumo  --- ID following, was on Zosyn and fluconazole, changed to IV Ceftriaxone 08/26, likely will need until 09/08, will need CXR and likely po Augmentin until follow up CT Chest with Pulmonary  --- seen by SLP, recommend regular/thin liquids  --- Follow up in Pulmonary clinic 09/25, CT chest to be done prior to visit  -- Follow up with Surgery outpatient     Acute on chronic respiratory failure  Advanced COPD  Tobacco dependence  --- Has been on 4 L, almost at baseline 3-4L NC at home  --- Has been on oral taper of steroids for her COPD  --- prednisone taper  --- PTA ICS/LABA  ---  "Pulmonology signed off  --- encourage smoking cessation     Paroxysmal A-fib/a flutter; status post PPM  Aortic stenosis status post TAVR  Acute on chronic HFpEF  --- Previously  on Cardizem drip at   --- Currently on metoprolol and diltiazem and digoxin with hold parameters  --- She is off amiodarone due to QTc prolongation  --- PTA lasix 40mg  --- On Eliquis  --- Cardiology signed off 8/15  --echo 4/2024 with EF 55-60%   --- monitor on telemetry   --- mag ok     Deconditioning   --- Previously recommended to go to TCU and has bed there.  ---PT/OT recommend TCU     Normocytic anemia; overall stable     Hyperlipidemia--statin ordered     Diabetes mellitus, type II  --- Not on home meds  --- Last A1c 6.2  --- Continue insulin sliding scale     Sacral erythema  Berna anal skin breakdown  Calmoseptine    Epigastric pain  Denies nausea, vomting  Has constant epigastric pain  On PPI, Tums prn  Add Maalox prn          Diet: Fluid restriction 1800 ML FLUID  Regular Diet Adult    DVT Prophylaxis: DOAC  Chairez Catheter: Not present  Lines: PRESENT      PICC 08/10/24 Triple Lumen Right Brachial vein medial-Site Assessment: WDL      Cardiac Monitoring: None  Code Status: Full Code      Clinically Significant Risk Factors            # Hypomagnesemia: Lowest Mg = 1.6 mg/dL in last 2 days, will replace as needed   # Hypoalbuminemia: Lowest albumin = 2.2 g/dL at 8/18/2024  7:04 AM, will monitor as appropriate     # Hypertension: Noted on problem list           # Overweight: Estimated body mass index is 25.53 kg/m  as calculated from the following:    Height as of 8/10/24: 1.651 m (5' 5\").    Weight as of this encounter: 69.6 kg (153 lb 7 oz).      # Financial/Environmental Concerns: none   # Pacemaker present             Disposition Plan     Medically Ready for Discharge: Anticipated Tomorrow             Lauren Willson MD  Hospitalist Service  [facility]  Securely message with [employer] (more info)  Text " page via [facility] Paging/Directory   ______________________________________________________________________    Interval History   Patient was examined at the bedside, c/o being tired, feeling icky and constant epigastric pain, added Maalox  Anticipate discharge tomorrow    Physical Exam   Vital Signs: Temp: 98  F (36.7  C) Temp src: Oral BP: 98/55 Pulse: 85   Resp: 18 SpO2: 90 % O2 Device: Nasal cannula Oxygen Delivery: 4 LPM  Weight: 153 lbs 7.04 oz    General Appearance:  Pleasant frail, NAD, on 4 LPM NC  Respiratory: Relatively clear, mild rhonchi anteriorly.  Chest tube removed  Cardiovascular: RRR  GI: Soft and nontender without hepatosplenomegaly, masses palpable.  Skin: no significant lower extremity edema.  Frail skin with senile purpura and tattoos noted without open areas.  Other:  neuro grossly intact without focal deficits appreciated    Medical Decision Making       40 MINUTES SPENT BY ME on the date of service doing chart review, history, exam, documentation & further activities per the note.      Data     I have personally reviewed the following data over the past 24 hrs:    N/A  \   N/A   / N/A     143 99 12.2 /  269 (H)   3.5 40 (H) 0.46 (L) \       Imaging results reviewed over the past 24 hrs:   Recent Results (from the past 24 hour(s))   XR Chest Port 1 View    Narrative    EXAM: XR CHEST PORT 1 VIEW  LOCATION: [facility]  DATE: 8/27/2024    INDICATION: Recheck Chest tube, s p VATS 8 19, right lung re expansion with pulmonary toileting  COMPARISON: 8/26/2024      Impression    IMPRESSION: Very minimal increase in aeration in the right lung. There still remains significant atelectasis right lung base with pleural fluid or thickening. Minimal pleural fluid or thickening left lung base with atelectasis. Cardiac enlargement.   Pulmonary vascular congestion. Aortic calcification. AVR. Right PICC with tip in the low SVC. Left-sided cardiac pacemaker.

## 2024-08-28 NOTE — PROGRESS NOTES
[facility]    Medicine Progress Note - Hospitalist Service    Date of Admission:  8/17/2024    Assessment & Plan   74-year-old female with a past medical history of a flutter, COPD, ongoing tobacco abuse, previous pleural effusions and chronic pain admitted to the hospital with empyema; transferred from [facility] to [facility] for surgical management.     Empyema  --- s/p VATS - per surgery no true emphyema or rind found - no ability to expand lung  --- Repeat CT 08/20 shows pasty right pleural effusion s/p chest tube placement, moderate likely loculated/complicated residual as well as small associated right pneumothorax  Reexpansion of right upper lobe with persistent collapse of right middle and lower lobe segments.  Significant retained secretions greater in the right lower lobe. Increasing left pleural effusion  ---Plan was for bronchoscopy 08/21, not done as patient was requiring upto 10L oxygen, due to risk of being intubated  ---Continue Xopenex + ipratropium, hypertonic saline. Volera - did not tolerate, Using flutter valve  --- having daily CXR   --- General surgery and pulmonology following  --- Chest tube removed today 08/25, repeat CXR no evidence of pneumothorax  --- first bronchoscopy 8/11 cultures showing E. coli and Candida albicans with pleural effusion cultures showing strep pneumo  --- ID following, was on Zosyn and fluconazole, changed to IV Ceftriaxone 08/26, likely will need until 09/08, will need CXR and likely po Augmentin until follow up CT Chest with Pulmonary  --- seen by SLP, recommend regular/thin liquids  --- Follow up in Pulmonary clinic 09/25, CT chest to be done prior to visit  -- Follow up with Surgery outpatient     Acute on chronic respiratory failure  Advanced COPD  Tobacco dependence  --- Has been on 4 L, almost at baseline 3-4L NC at home  --- Has been on oral taper of steroids for her COPD  --- prednisone taper  --- PTA ICS/LABA  ---  "Pulmonology signed off  --- encourage smoking cessation    Metabolic alkalosis  Suspect primary metabolic alkalosis due to Lasix and poor oral intake  Held Lasix, will give gentle IV fluids 500 ml  Suspect likely home lasix need to be decreased 40mg to 20mg     Paroxysmal A-fib/a flutter; status post PPM  Aortic stenosis status post TAVR  Acute on chronic HFpEF  --- Previously  on Cardizem drip at   --- Currently on metoprolol and diltiazem and digoxin with hold parameters  --- She is off amiodarone due to QTc prolongation  --- PTA lasix 40mg - held due to metabolic alkalosis  --- On Eliquis  --- Cardiology signed off 8/15  --echo 4/2024 with EF 55-60%   --- monitor on telemetry   --- mag ok     Deconditioning   --- Previously recommended to go to TCU and has bed there.  ---PT/OT recommend TCU     Normocytic anemia; overall stable     Hyperlipidemia--statin ordered     Diabetes mellitus, type II  --- Not on home meds  --- Last A1c 6.2  --- Continue insulin sliding scale     Sacral erythema  Berna anal skin breakdown  Calmoseptine    Epigastric pain  Denies nausea, vomting  Has constant epigastric pain  On PPI, Tums prn  Add Maalox prn          Diet: Fluid restriction 1800 ML FLUID  Regular Diet Adult    DVT Prophylaxis: DOAC  Chairez Catheter: Not present  Lines: PRESENT      PICC 08/10/24 Triple Lumen Right Brachial vein medial-Site Assessment: WDL      Cardiac Monitoring: None  Code Status: Full Code      Clinically Significant Risk Factors            # Hypomagnesemia: Lowest Mg = 1.6 mg/dL in last 2 days, will replace as needed   # Hypoalbuminemia: Lowest albumin = 2.2 g/dL at 8/18/2024  7:04 AM, will monitor as appropriate     # Hypertension: Noted on problem list           # Overweight: Estimated body mass index is 25.94 kg/m  as calculated from the following:    Height as of 8/10/24: 1.651 m (5' 5\").    Weight as of this encounter: 70.7 kg (155 lb 13.8 oz).      # Financial/Environmental Concerns: none   # " Pacemaker present             Disposition Plan     Medically Ready for Discharge: Anticipated Tomorrow             Lauren Willson MD  Hospitalist Service  [facility]  Securely message with [employer] (more info)  Text page via [employer] Paging/Directory   ______________________________________________________________________    Interval History   Patient was examined at the bedside, states had some trouble breathing otherwise denies any complaints  Metabolic alkalosis worsening, lasix held and gentle IV fluids and monitor    Physical Exam   Vital Signs: Temp: 98.1  F (36.7  C) Temp src: Oral BP: 116/63 Pulse: 86   Resp: 20 SpO2: 91 % O2 Device: Nasal cannula Oxygen Delivery: 4 LPM  Weight: 155 lbs 13.84 oz    General Appearance:  Pleasant frail, NAD, on 4 LPM NC  Respiratory: Relatively clear, mild rhonchi anteriorly.  Chest tube removed  Cardiovascular: RRR  GI: Soft and nontender without hepatosplenomegaly, masses palpable.  Skin: no significant lower extremity edema.  Frail skin with senile purpura and tattoos noted without open areas.  Other:  neuro grossly intact without focal deficits appreciated    Medical Decision Making       50 MINUTES SPENT BY ME on the date of service doing chart review, history, exam, documentation & further activities per the note.      Data     I have personally reviewed the following data over the past 24 hrs:    N/A  \   N/A   / 156     143 97 (L) 11.3 /  182 (H)   4.0 42 (H) 0.46 (L) \       Imaging results reviewed over the past 24 hrs:   No results found for this or any previous visit (from the past 24 hour(s)).

## 2024-08-28 NOTE — PLAN OF CARE
Problem: Adult Inpatient Plan of Care  Goal: Optimal Comfort and Wellbeing  Outcome: Progressing  Intervention: Provide Person-Centered Care  Recent Flowsheet Documentation  Taken 8/28/2024 0804 by Jennifer Escobar RN  Trust Relationship/Rapport: care explained     Problem: Dysrhythmia  Goal: Normalized Cardiac Rhythm  Outcome: Progressing     Problem: Risk for Delirium  Goal: Improved Attention and Thought Clarity  Outcome: Progressing   Goal Outcome Evaluation:       Pt denied pain, is A/O X 4 forgetful, discontinue was to be today but cancelled for more monitoring, pt ate fine and is voiding with the pure wick, had a K replacement and protocol is to be rechecked tomorrow , restarted NS at 50 ml/hr, pt is on NC sating  92/93%

## 2024-08-28 NOTE — PLAN OF CARE
"Pt is A&Ox4, sleepy, calm, cooperative  - vitals stable  - denies pain, denies n/v  - chest tube sites unremarkable  - 4L O2 via nasal cannula  - PICC line (right arm) flushing well, blood return noted x 3  - on potassium and mag protocol  - PCDs on, preventative heel dressing                   Problem: Gas Exchange Impaired  Goal: Optimal Gas Exchange  Outcome: Not Progressing  Intervention: Optimize Oxygenation and Ventilation  Recent Flowsheet Documentation  Taken 8/28/2024 0100 by Pilar Duran, RN  Head of Bed (HOB) Positioning: HOB at 20-30 degrees     Problem: Respiratory Compromise (Pneumothorax)  Goal: Optimal Oxygenation and Ventilation  Outcome: Not Progressing     Problem: Adult Inpatient Plan of Care  Goal: Plan of Care Review  Description: The Plan of Care Review/Shift note should be completed every shift.  The Outcome Evaluation is a brief statement about your assessment that the patient is improving, declining, or no change.  This information will be displayed automatically on your shift  note.  Outcome: Progressing  Flowsheets (Taken 8/28/2024 0544)  Overall Patient Progress: improving  Goal: Patient-Specific Goal (Individualized)  Description: You can add care plan individualizations to a care plan. Examples of Individualization might be:  \"Parent requests to be called daily at 9am for status\", \"I have a hard time hearing out of my right ear\", or \"Do not touch me to wake me up as it startles  me\".  Outcome: Progressing  Goal: Absence of Hospital-Acquired Illness or Injury  Outcome: Progressing  Intervention: Identify and Manage Fall Risk  Recent Flowsheet Documentation  Taken 8/28/2024 0100 by Pilar Duran, RN  Safety Promotion/Fall Prevention:   activity supervised   assistive device/personal items within reach   clutter free environment maintained   nonskid shoes/slippers when out of bed   safety round/check completed   lighting adjusted  Intervention: Prevent Skin Injury  Recent " Flowsheet Documentation  Taken 8/28/2024 0100 by Pilar Duran RN  Body Position: position changed independently  Device Skin Pressure Protection:   absorbent pad utilized/changed   pressure points protected   positioning supports utilized  Intervention: Prevent and Manage VTE (Venous Thromboembolism) Risk  Recent Flowsheet Documentation  Taken 8/28/2024 0100 by Pilar Duran RN  VTE Prevention/Management: SCDs on (sequential compression devices)  Intervention: Prevent Infection  Recent Flowsheet Documentation  Taken 8/28/2024 0100 by Pilar Duran RN  Infection Prevention:   hand hygiene promoted   rest/sleep promoted   single patient room provided  Goal: Optimal Comfort and Wellbeing  Outcome: Progressing  Intervention: Provide Person-Centered Care  Recent Flowsheet Documentation  Taken 8/28/2024 0100 by Pilar Duran RN  Trust Relationship/Rapport:   care explained   questions encouraged   questions answered   thoughts/feelings acknowledged   reassurance provided  Goal: Readiness for Transition of Care  Outcome: Progressing     Problem: Dysrhythmia  Goal: Normalized Cardiac Rhythm  Outcome: Progressing  Intervention: Monitor and Manage Cardiac Rhythm Effect  Recent Flowsheet Documentation  Taken 8/28/2024 0100 by Pilar Duran RN  VTE Prevention/Management: SCDs on (sequential compression devices)     Problem: Comorbidity Management  Goal: Maintenance of COPD Symptom Control  Outcome: Progressing  Intervention: Maintain COPD Symptom Control  Recent Flowsheet Documentation  Taken 8/28/2024 0100 by Pilar Duran RN  Supportive Measures:   active listening utilized   verbalization of feelings encouraged  Medication Review/Management: medications reviewed  Goal: Blood Glucose Levels Within Targeted Range  Outcome: Progressing  Intervention: Monitor and Manage Glycemia  Recent Flowsheet Documentation  Taken 8/28/2024 0100 by Pilar Duran RN  Glycemic Management: blood glucose  monitored  Medication Review/Management: medications reviewed  Goal: Blood Pressure in Desired Range  Outcome: Progressing  Intervention: Maintain Blood Pressure Management  Recent Flowsheet Documentation  Taken 8/28/2024 0100 by Pilar Duran RN  Medication Review/Management: medications reviewed     Problem: Risk for Delirium  Goal: Optimal Coping  Outcome: Progressing  Intervention: Optimize Psychosocial Adjustment to Delirium  Recent Flowsheet Documentation  Taken 8/28/2024 0100 by Pilar Duran RN  Supportive Measures:   active listening utilized   verbalization of feelings encouraged  Goal: Improved Behavioral Control  Outcome: Progressing  Intervention: Prevent and Manage Agitation  Recent Flowsheet Documentation  Taken 8/28/2024 0100 by Pilar Duran RN  Environment Familiarity/Consistency: daily routine followed  Intervention: Minimize Safety Risk  Recent Flowsheet Documentation  Taken 8/28/2024 0100 by Pilar Duran RN  Communication Enhancement Strategies: call light answered in person  Enhanced Safety Measures:   assistive devices when indicated   review medications for side effects with activity  Trust Relationship/Rapport:   care explained   questions encouraged   questions answered   thoughts/feelings acknowledged   reassurance provided  Goal: Improved Attention and Thought Clarity  Outcome: Progressing  Intervention: Maximize Cognitive Function  Recent Flowsheet Documentation  Taken 8/28/2024 0100 by Pilar Duran RN  Sensory Stimulation Regulation:   care clustered   lighting decreased   quiet environment promoted  Reorientation Measures:   calendar in view   clock in view  Goal: Improved Sleep  Outcome: Progressing  Intervention: Promote Sleep  Recent Flowsheet Documentation  Taken 8/28/2024 0100 by Pilar Duran RN  Sleep/Rest Enhancement:   awakenings minimized   regular sleep/rest pattern promoted   room darkened     Problem: Skin Injury Risk Increased  Goal: Skin  Health and Integrity  Outcome: Progressing  Intervention: Plan: Nurse Driven Intervention: Moisture Management  Recent Flowsheet Documentation  Taken 8/28/2024 0100 by Pilar Duran RN  Moisture Interventions:   Encourage regular toileting   Incontinence pad   Urinary collection device  Plan: Moisture Management:   incontinence pad   urinary collection device  Intervention: Plan: Nurse Driven Intervention: Friction and Shear  Recent Flowsheet Documentation  Taken 8/28/2024 0100 by Pilar Duran RN  Friction/Shear Interventions:   HOB 30 degrees or less   Preventative dressing heels  Intervention: Optimize Skin Protection  Recent Flowsheet Documentation  Taken 8/28/2024 0100 by Pilar Duran RN  Activity Management: activity adjusted per tolerance  Head of Bed (HOB) Positioning: HOB at 20-30 degrees  Intervention: Promote and Optimize Oral Intake  Recent Flowsheet Documentation  Taken 8/28/2024 0100 by Pilar Duran RN  Oral Nutrition Promotion: rest periods promoted   Goal Outcome Evaluation:           Overall Patient Progress: improvingOverall Patient Progress: improving

## 2024-08-28 NOTE — PROGRESS NOTES
"Care Management Follow Up    Length of Stay (days): 10    Expected Discharge Date: 08/28/2024     Concerns to be Addressed: Care progression - discharge planning     Patient plan of care discussed at interdisciplinary rounds: Yes    Anticipated Discharge Disposition:  Transitional care      Anticipated Discharge Services:  Transitional care   Anticipated Discharge DME:  NA    Patient/family educated on Medicare website which has current facility and service quality ratings:  Yes  Education Provided on the Discharge Plan:  Yes per team  Patient/Family in Agreement with the Plan:  Yes    Referrals Placed by CM/SW:  Yes  Private pay costs discussed: Transportation costs    Additional Information:  0825 called Destinee at [facility] and left a message regarding the discharge.    Sent a message to Dr. Willson    Social Hx: \"Transferred from . Reside in a home of her son/daughter-in-law Cynthia, who is pt's PCA. Family assists w/I/ADL. Accepted at [facility]. PAS completed. Family to transport.\"     RNCM to follow for medical progression, recommendations, and final discharge plan.     Ana Buitrago, RN     0853 per Dr. Willson, patient is ready.    Met with patient at bedside to discuss the above. Patient requested a WC ride.  Discussed out of pocket cost of [employer] medical transportation by wheelchair with patient. Patient agreed with the plan to have transportation arranged by [employer] transport.      Called  [employer] to set up a WC ride between [phone] with oxygen.  Paged to update bedside nurseJennifer  Sent message to update Destinee    0954 rec'd a message from Dr. Willson, \"Won't discharge her, co2 going up, will do work up.\"  Sent message to Detsinee to cancel discharge today.  Called to cancel transport.     "

## 2024-08-28 NOTE — TREATMENT PLAN
RCAT Treatment Plan    Patient Score: 13  Patient Acuity: 3    Clinical Indication for Therapy: history of mucous producing disease    Therapy Ordered: Continue DuoNeb and Flutter Valve QID    Assessment Summary: Pt is on 5 LPM nasal cannula. Pt is O2 dependent and wears 4 LPM at home. RR 20. BS clear, diminished in lower lobes. Infrequent strong, congested, and productive cough. Needs reminders to take deep breaths and cough occationally. Resting peacefully but alert and conversing appropriately.    Aamir Ross, RT  8/28/2024

## 2024-08-29 ENCOUNTER — APPOINTMENT (OUTPATIENT)
Dept: RADIOLOGY | Facility: HOSPITAL | Age: 74
DRG: 166 | End: 2024-08-29
Attending: INTERNAL MEDICINE
Payer: COMMERCIAL

## 2024-08-29 ENCOUNTER — PATIENT OUTREACH (OUTPATIENT)
Dept: GERIATRIC MEDICINE | Facility: CLINIC | Age: 74
End: 2024-08-29
Payer: COMMERCIAL

## 2024-08-29 ENCOUNTER — APPOINTMENT (OUTPATIENT)
Dept: PHYSICAL THERAPY | Facility: HOSPITAL | Age: 74
DRG: 166 | End: 2024-08-29
Attending: FAMILY MEDICINE
Payer: COMMERCIAL

## 2024-08-29 LAB
ANION GAP SERPL CALCULATED.3IONS-SCNC: 5 MMOL/L (ref 7–15)
BUN SERPL-MCNC: 12.1 MG/DL (ref 8–23)
CALCIUM SERPL-MCNC: 8.4 MG/DL (ref 8.8–10.4)
CHLORIDE SERPL-SCNC: 97 MMOL/L (ref 98–107)
CREAT SERPL-MCNC: 0.42 MG/DL (ref 0.51–0.95)
EGFRCR SERPLBLD CKD-EPI 2021: >90 ML/MIN/1.73M2
ERYTHROCYTE [DISTWIDTH] IN BLOOD BY AUTOMATED COUNT: 19.7 % (ref 10–15)
GLUCOSE BLDC GLUCOMTR-MCNC: 111 MG/DL (ref 70–99)
GLUCOSE BLDC GLUCOMTR-MCNC: 126 MG/DL (ref 70–99)
GLUCOSE BLDC GLUCOMTR-MCNC: 176 MG/DL (ref 70–99)
GLUCOSE BLDC GLUCOMTR-MCNC: 203 MG/DL (ref 70–99)
GLUCOSE SERPL-MCNC: 131 MG/DL (ref 70–99)
HCO3 SERPL-SCNC: 42 MMOL/L (ref 22–29)
HCT VFR BLD AUTO: 32 % (ref 35–47)
HGB BLD-MCNC: 9 G/DL (ref 11.7–15.7)
MAGNESIUM SERPL-MCNC: 1.6 MG/DL (ref 1.7–2.3)
MCH RBC QN AUTO: 24.9 PG (ref 26.5–33)
MCHC RBC AUTO-ENTMCNC: 28.1 G/DL (ref 31.5–36.5)
MCV RBC AUTO: 88 FL (ref 78–100)
NT-PROBNP SERPL-MCNC: 2611 PG/ML (ref 0–900)
PLATELET # BLD AUTO: 124 10E3/UL (ref 150–450)
POTASSIUM SERPL-SCNC: 3.7 MMOL/L (ref 3.4–5.3)
RBC # BLD AUTO: 3.62 10E6/UL (ref 3.8–5.2)
SODIUM SERPL-SCNC: 144 MMOL/L (ref 135–145)
WBC # BLD AUTO: 6.5 10E3/UL (ref 4–11)

## 2024-08-29 PROCEDURE — 83735 ASSAY OF MAGNESIUM: CPT | Performed by: STUDENT IN AN ORGANIZED HEALTH CARE EDUCATION/TRAINING PROGRAM

## 2024-08-29 PROCEDURE — 99233 SBSQ HOSP IP/OBS HIGH 50: CPT | Performed by: INTERNAL MEDICINE

## 2024-08-29 PROCEDURE — 250N000013 HC RX MED GY IP 250 OP 250 PS 637: Performed by: SPECIALIST

## 2024-08-29 PROCEDURE — 94640 AIRWAY INHALATION TREATMENT: CPT | Mod: 76

## 2024-08-29 PROCEDURE — 71045 X-RAY EXAM CHEST 1 VIEW: CPT

## 2024-08-29 PROCEDURE — 250N000013 HC RX MED GY IP 250 OP 250 PS 637: Performed by: FAMILY MEDICINE

## 2024-08-29 PROCEDURE — 250N000011 HC RX IP 250 OP 636: Performed by: INTERNAL MEDICINE

## 2024-08-29 PROCEDURE — 82947 ASSAY GLUCOSE BLOOD QUANT: CPT | Performed by: SPECIALIST

## 2024-08-29 PROCEDURE — 97110 THERAPEUTIC EXERCISES: CPT | Mod: GP

## 2024-08-29 PROCEDURE — 250N000009 HC RX 250: Performed by: INTERNAL MEDICINE

## 2024-08-29 PROCEDURE — 120N000001 HC R&B MED SURG/OB

## 2024-08-29 PROCEDURE — 999N000157 HC STATISTIC RCP TIME EA 10 MIN

## 2024-08-29 PROCEDURE — 83880 ASSAY OF NATRIURETIC PEPTIDE: CPT | Performed by: INTERNAL MEDICINE

## 2024-08-29 PROCEDURE — 250N000013 HC RX MED GY IP 250 OP 250 PS 637: Performed by: INTERNAL MEDICINE

## 2024-08-29 PROCEDURE — 250N000013 HC RX MED GY IP 250 OP 250 PS 637: Performed by: STUDENT IN AN ORGANIZED HEALTH CARE EDUCATION/TRAINING PROGRAM

## 2024-08-29 PROCEDURE — 85014 HEMATOCRIT: CPT | Performed by: INTERNAL MEDICINE

## 2024-08-29 PROCEDURE — 250N000009 HC RX 250

## 2024-08-29 PROCEDURE — 80048 BASIC METABOLIC PNL TOTAL CA: CPT | Performed by: SPECIALIST

## 2024-08-29 RX ORDER — FUROSEMIDE 20 MG
20 TABLET ORAL DAILY
Status: DISCONTINUED | OUTPATIENT
Start: 2024-08-30 | End: 2024-08-31

## 2024-08-29 RX ORDER — POTASSIUM CHLORIDE 1500 MG/1
40 TABLET, EXTENDED RELEASE ORAL ONCE
Status: COMPLETED | OUTPATIENT
Start: 2024-08-29 | End: 2024-08-29

## 2024-08-29 RX ADMIN — GABAPENTIN 600 MG: 300 CAPSULE ORAL at 21:12

## 2024-08-29 RX ADMIN — SODIUM CHLORIDE SOLN NEBU 3% 3 ML: 3 NEBU SOLN at 20:43

## 2024-08-29 RX ADMIN — SODIUM CHLORIDE SOLN NEBU 3% 3 ML: 3 NEBU SOLN at 12:58

## 2024-08-29 RX ADMIN — SUCRALFATE 1 G: 1 TABLET ORAL at 08:06

## 2024-08-29 RX ADMIN — LIDOCAINE 1 PATCH: 4 PATCH TOPICAL at 21:13

## 2024-08-29 RX ADMIN — APIXABAN 5 MG: 5 TABLET, FILM COATED ORAL at 08:06

## 2024-08-29 RX ADMIN — CEFTRIAXONE SODIUM 2 G: 2 INJECTION, POWDER, FOR SOLUTION INTRAMUSCULAR; INTRAVENOUS at 12:27

## 2024-08-29 RX ADMIN — SUCRALFATE 1 G: 1 TABLET ORAL at 12:04

## 2024-08-29 RX ADMIN — SODIUM CHLORIDE SOLN NEBU 3% 3 ML: 3 NEBU SOLN at 08:27

## 2024-08-29 RX ADMIN — ANORECTAL OINTMENT: 15.7; .44; 24; 20.6 OINTMENT TOPICAL at 23:17

## 2024-08-29 RX ADMIN — APIXABAN 5 MG: 5 TABLET, FILM COATED ORAL at 21:12

## 2024-08-29 RX ADMIN — ATORVASTATIN CALCIUM 20 MG: 10 TABLET, FILM COATED ORAL at 21:12

## 2024-08-29 RX ADMIN — POTASSIUM CHLORIDE 40 MEQ: 1500 TABLET, EXTENDED RELEASE ORAL at 10:28

## 2024-08-29 RX ADMIN — LEVALBUTEROL HYDROCHLORIDE 1.25 MG: 1.25 SOLUTION RESPIRATORY (INHALATION) at 08:24

## 2024-08-29 RX ADMIN — LEVALBUTEROL HYDROCHLORIDE 1.25 MG: 1.25 SOLUTION RESPIRATORY (INHALATION) at 20:43

## 2024-08-29 RX ADMIN — FLUTICASONE FUROATE AND VILANTEROL TRIFENATATE 1 PUFF: 200; 25 POWDER RESPIRATORY (INHALATION) at 08:07

## 2024-08-29 RX ADMIN — IPRATROPIUM BROMIDE 0.5 MG: 0.5 SOLUTION RESPIRATORY (INHALATION) at 12:58

## 2024-08-29 RX ADMIN — SUCRALFATE 1 G: 1 TABLET ORAL at 17:15

## 2024-08-29 RX ADMIN — IPRATROPIUM BROMIDE 0.5 MG: 0.5 SOLUTION RESPIRATORY (INHALATION) at 08:25

## 2024-08-29 RX ADMIN — IPRATROPIUM BROMIDE 0.5 MG: 0.5 SOLUTION RESPIRATORY (INHALATION) at 20:43

## 2024-08-29 RX ADMIN — SENNOSIDES AND DOCUSATE SODIUM 1 TABLET: 8.6; 5 TABLET ORAL at 08:06

## 2024-08-29 RX ADMIN — GABAPENTIN 600 MG: 300 CAPSULE ORAL at 08:05

## 2024-08-29 RX ADMIN — METOPROLOL TARTRATE 50 MG: 25 TABLET, FILM COATED ORAL at 08:06

## 2024-08-29 RX ADMIN — TRAMADOL HYDROCHLORIDE 50 MG: 50 TABLET, COATED ORAL at 10:24

## 2024-08-29 RX ADMIN — GABAPENTIN 600 MG: 300 CAPSULE ORAL at 15:21

## 2024-08-29 RX ADMIN — METOPROLOL TARTRATE 50 MG: 25 TABLET, FILM COATED ORAL at 21:12

## 2024-08-29 RX ADMIN — DILTIAZEM HYDROCHLORIDE 120 MG: 120 CAPSULE, EXTENDED RELEASE ORAL at 12:03

## 2024-08-29 RX ADMIN — DIGOXIN 125 MCG: 125 TABLET ORAL at 10:28

## 2024-08-29 RX ADMIN — PANTOPRAZOLE SODIUM 40 MG: 40 TABLET, DELAYED RELEASE ORAL at 08:06

## 2024-08-29 ASSESSMENT — ACTIVITIES OF DAILY LIVING (ADL)
ADLS_ACUITY_SCORE: 42
ADLS_ACUITY_SCORE: 44
ADLS_ACUITY_SCORE: 42
ADLS_ACUITY_SCORE: 44
ADLS_ACUITY_SCORE: 42

## 2024-08-29 NOTE — PROGRESS NOTES
"Care Management Follow Up    Length of Stay (days): 11    Expected Discharge Date: 08/29/2024     Concerns to be Addressed: Care progression - discharge planning     Patient plan of care discussed at interdisciplinary rounds: Yes    Anticipated Discharge Disposition:  Transitional care - [facility]     Anticipated Discharge Services:  Transitional care - [facility]  Anticipated Discharge DME:  NA    Patient/family educated on Medicare website which has current facility and service quality ratings:  Yes  Education Provided on the Discharge Plan:  Yes per team  Patient/Family in Agreement with the Plan:  Yes    Referrals Placed by CM/SW:  Yes  Private pay costs discussed: Transportation costs    Additional Information:  2220 message sent to update Destinee at [facility].     Social Hx: \"Transferred from . Reside in a home of her son/daughter-in-law Cynthia, who is pt's PCA. Family assists w/I/ADL. Accepted at [facility]. PAS completed. Family to transport.\"     RNCM to follow for medical progression, recommendations, and final discharge plan.     Ana Buitrago RN     1212 rec'd a voicemail  from [facility] [phone] requesting an update.  Called and left a message to return call.    1306 Per provider, Dr. Dutta, patient is not ready. Maybe tomorrow.  Message sent to update Destinee  "

## 2024-08-29 NOTE — PLAN OF CARE
Pt is A&Ox4, calm, cooperative  - vitals stable  - 5L O2 via nasal cannula  - external urinary present  - PICC patent  - Protocols: magnesium and potassium   - denies pain, discomfort, nausea this shift    Refuses:  - Repositioning   - PCDs      Problem: Gas Exchange Impaired  Goal: Optimal Gas Exchange  Outcome: Not Progressing  Intervention: Optimize Oxygenation and Ventilation  Recent Flowsheet Documentation  Taken 8/29/2024 0200 by Pilar Duran RN  Head of Bed (HOB) Positioning: HOB at 20-30 degrees     Problem: Respiratory Compromise (Pneumothorax)  Goal: Optimal Oxygenation and Ventilation  Outcome: Not Progressing     Problem: Skin Injury Risk Increased  Goal: Skin Health and Integrity  Outcome: Not Progressing  Intervention: Plan: Nurse Driven Intervention: Moisture Management  Recent Flowsheet Documentation  Taken 8/29/2024 0200 by Pilar Duran RN  Moisture Interventions:   Incontinence pad   Urinary collection device  Plan: Moisture Management:   incontinence pad   urinary collection device  Intervention: Plan: Nurse Driven Intervention: Friction and Shear  Recent Flowsheet Documentation  Taken 8/29/2024 0200 by Pilar Duran RN  Friction/Shear Interventions:   HOB 30 degrees or less   Preventative dressing heels  Intervention: Optimize Skin Protection  Recent Flowsheet Documentation  Taken 8/29/2024 0200 by Pilar Duran RN  Activity Management: activity adjusted per tolerance  Head of Bed (HOB) Positioning: HOB at 20-30 degrees  Intervention: Promote and Optimize Oral Intake  Recent Flowsheet Documentation  Taken 8/29/2024 0200 by Pilar Duran RN  Oral Nutrition Promotion: rest periods promoted     Problem: Adult Inpatient Plan of Care  Goal: Plan of Care Review  Description: The Plan of Care Review/Shift note should be completed every shift.  The Outcome Evaluation is a brief statement about your assessment that the patient is improving, declining, or no change.  This  "information will be displayed automatically on your shift  note.  Outcome: Progressing  Flowsheets (Taken 8/29/2024 0521)  Plan of Care Reviewed With: patient  Overall Patient Progress: improving  Goal: Patient-Specific Goal (Individualized)  Description: You can add care plan individualizations to a care plan. Examples of Individualization might be:  \"Parent requests to be called daily at 9am for status\", \"I have a hard time hearing out of my right ear\", or \"Do not touch me to wake me up as it startles  me\".  Outcome: Progressing  Goal: Absence of Hospital-Acquired Illness or Injury  Outcome: Progressing  Intervention: Identify and Manage Fall Risk  Recent Flowsheet Documentation  Taken 8/29/2024 0200 by Pilar Duran RN  Safety Promotion/Fall Prevention:   activity supervised   assistive device/personal items within reach   clutter free environment maintained   nonskid shoes/slippers when out of bed   safety round/check completed   lighting adjusted  Intervention: Prevent Skin Injury  Recent Flowsheet Documentation  Taken 8/29/2024 0200 by Pilar Duran RN  Body Position: position changed independently  Device Skin Pressure Protection:   absorbent pad utilized/changed   pressure points protected   positioning supports utilized  Intervention: Prevent and Manage VTE (Venous Thromboembolism) Risk  Recent Flowsheet Documentation  Taken 8/29/2024 0200 by Pilar Duran RN  VTE Prevention/Management: SCDs on (sequential compression devices)  Intervention: Prevent Infection  Recent Flowsheet Documentation  Taken 8/29/2024 0200 by Pilar Duran RN  Infection Prevention:   hand hygiene promoted   rest/sleep promoted   single patient room provided  Goal: Optimal Comfort and Wellbeing  Outcome: Progressing  Intervention: Provide Person-Centered Care  Recent Flowsheet Documentation  Taken 8/29/2024 0200 by Pilar Duran RN  Trust Relationship/Rapport:   care explained   questions encouraged   questions " answered   thoughts/feelings acknowledged   reassurance provided  Goal: Readiness for Transition of Care  Outcome: Progressing     Problem: Dysrhythmia  Goal: Normalized Cardiac Rhythm  Outcome: Progressing  Intervention: Monitor and Manage Cardiac Rhythm Effect  Recent Flowsheet Documentation  Taken 8/29/2024 0200 by Pilar Duran RN  VTE Prevention/Management: SCDs on (sequential compression devices)     Problem: Comorbidity Management  Goal: Maintenance of COPD Symptom Control  Outcome: Progressing  Intervention: Maintain COPD Symptom Control  Recent Flowsheet Documentation  Taken 8/29/2024 0200 by Pilar Duran RN  Supportive Measures:   active listening utilized   verbalization of feelings encouraged  Medication Review/Management: medications reviewed  Goal: Blood Glucose Levels Within Targeted Range  Outcome: Progressing  Intervention: Monitor and Manage Glycemia  Recent Flowsheet Documentation  Taken 8/29/2024 0200 by Pilar Duran RN  Glycemic Management: blood glucose monitored  Medication Review/Management: medications reviewed  Goal: Blood Pressure in Desired Range  Outcome: Progressing  Intervention: Maintain Blood Pressure Management  Recent Flowsheet Documentation  Taken 8/29/2024 0200 by Pilar Duran RN  Medication Review/Management: medications reviewed     Problem: Risk for Delirium  Goal: Optimal Coping  Outcome: Progressing  Intervention: Optimize Psychosocial Adjustment to Delirium  Recent Flowsheet Documentation  Taken 8/29/2024 0200 by Pilar Duran RN  Supportive Measures:   active listening utilized   verbalization of feelings encouraged  Goal: Improved Behavioral Control  Outcome: Progressing  Intervention: Prevent and Manage Agitation  Recent Flowsheet Documentation  Taken 8/29/2024 0200 by Pilar Duran RN  Environment Familiarity/Consistency: daily routine followed  Intervention: Minimize Safety Risk  Recent Flowsheet Documentation  Taken 8/29/2024 0200 by  Pilar Duran, RN  Communication Enhancement Strategies: call light answered in person  Enhanced Safety Measures:   assistive devices when indicated   review medications for side effects with activity  Trust Relationship/Rapport:   care explained   questions encouraged   questions answered   thoughts/feelings acknowledged   reassurance provided  Goal: Improved Attention and Thought Clarity  Outcome: Progressing  Intervention: Maximize Cognitive Function  Recent Flowsheet Documentation  Taken 8/29/2024 0200 by Pilar Duran, RN  Sensory Stimulation Regulation:   care clustered   lighting decreased   quiet environment promoted  Reorientation Measures:   calendar in view   clock in view  Goal: Improved Sleep  Outcome: Progressing  Intervention: Promote Sleep  Recent Flowsheet Documentation  Taken 8/29/2024 0200 by Pilar Duran, RN  Sleep/Rest Enhancement:   awakenings minimized   regular sleep/rest pattern promoted   room darkened   Goal Outcome Evaluation:      Plan of Care Reviewed With: patient    Overall Patient Progress: improvingOverall Patient Progress: improving

## 2024-08-29 NOTE — PLAN OF CARE
Problem: Adult Inpatient Plan of Care  Goal: Absence of Hospital-Acquired Illness or Injury  Intervention: Identify and Manage Fall Risk  Recent Flowsheet Documentation  Taken 8/28/2024 1800 by Lili Silva RN  Safety Promotion/Fall Prevention:   activity supervised   assistive device/personal items within reach   nonskid shoes/slippers when out of bed   safety round/check completed  Goal: Optimal Comfort and Wellbeing  Intervention: Monitor Pain and Promote Comfort  Recent Flowsheet Documentation  Taken 8/28/2024 2140 by Lili Silva RN  Pain Management Interventions: medication offered but refused  Taken 8/28/2024 2102 by Lili Silva RN  Pain Management Interventions: medication (see MAR)     Problem: Comorbidity Management  Goal: Maintenance of COPD Symptom Control  Intervention: Maintain COPD Symptom Control  Recent Flowsheet Documentation  Taken 8/28/2024 1800 by Lili Silva RN  Medication Review/Management: medications reviewed  Goal: Blood Glucose Levels Within Targeted Range  Intervention: Monitor and Manage Glycemia  Recent Flowsheet Documentation  Taken 8/28/2024 1800 by Lili Silva RN  Medication Review/Management: medications reviewed  Goal: Blood Pressure in Desired Range  Intervention: Maintain Blood Pressure Management  Recent Flowsheet Documentation  Taken 8/28/2024 1800 by Lili Silva RN  Medication Review/Management: medications reviewed     Problem: Risk for Delirium  Goal: Improved Behavioral Control  Intervention: Minimize Safety Risk  Recent Flowsheet Documentation  Taken 8/28/2024 1800 by Lili Silva RN  Enhanced Safety Measures:   assistive devices when indicated   review medications for side effects with activity     Problem: Skin Injury Risk Increased  Goal: Skin Health and Integrity  Intervention: Plan: Nurse Driven Intervention: Moisture Management  Recent Flowsheet Documentation  Taken 8/28/2024 1800 by Lili Silva RN  Moisture Interventions:    Incontinence pad   Urinary collection device  Intervention: Plan: Nurse Driven Intervention: Friction and Shear  Recent Flowsheet Documentation  Taken 2024 1800 by Lili Silva RN  Friction/Shear Interventions:   HOB 30 degrees or less   Silicone foam sacral dressing  Intervention: Optimize Skin Protection  Recent Flowsheet Documentation  Taken 2024 1855 by Lili Silva, RN  Activity Management: back to bed  Taken 2024 1819 by Lili Silva RN  Activity Management: sitting, edge of bed  Taken 2024 1800 by Lili Silva RN  Activity Management:   activity adjusted per tolerance   activity encouraged   Goal Outcome Evaluation:         Aox4, calm and cooperative.  Denies sob/, desat at start of shift, encouraged deep breathing and coughing, neb treatments given by RT. O2sat stable on 5lpm. Recheck BMP in the morning. Refused use of spirometer.  Refused to get out of bed but pt was able to sit at edge of bed for 30 mins to eat dinner. Educated on importance.  Good appetite for dinner, received 500 ml IV fluids.  Blood sugars in 200s, insulin given per scale.  Severe right shoulder pain, schedule lidocaine patch applied at bedtime, refused prn meds.

## 2024-08-29 NOTE — PROGRESS NOTES
[facility]    Medicine Progress Note - Hospitalist Service    Date of Admission:  8/17/2024    Assessment & Plan     74-year-old female with history of a flutter, COPD, ongoing tobacco abuse, previous pleural effusions and chronic pain admitted 8/17/24 with empyema and transferred from [facility] to [facility] for surgical management.     Empyema  Sputum cultures on 7/13/2024 with E. coli.   H/O recurrent right-sided pleural effusion status post bronchoscopy on 8/11/2024 with cultures that grew E. coli and Candida albicans.   Pleural effusion cultures growing strep pneumo  s/p VATS 8/19/24. No true emphyema or rind found, no ability to expand lung  Repeat CT 08/20 showed right pleural effusion s/p chest tube placement, moderate likely loculated/complicated residual as well as small associated right pneumothorax, reexpansion of right upper lobe with persistent collapse of right middle and lower lobe segments.  Significant retained secretions greater in the right lower lobe and increasing left pleural effusion  Plan was for bronchoscopy 08/21, not done as patient was requiring upto 10L oxygen and had high risk of being intubated  Chest tube removed 08/25 8/29: patient with complaints of increased SOB, productive cough and weakness. CXR 8/29 shows Increased opacification in the left lower lobe of concern for worsening pneumonia. Of note patient has remained afebrile and WBC remains normal on IV ceftriaxone pneumococcus and E coli . Yeast on BAL thought to be colonization  -- will discuss with ID  -- repeat sputum culture and BNP ordered  -- consider repeat chest CT  -- Continue Xopenex + ipratropium, hypertonic saline and flutter valve  -- current plan is for Ceftriaxone until 09/08 and then will need CXR and likely po Augmentin until follow up CT Chest with Pulmonary  --- seen by SLP, recommend regular/thin liquids  --- Follow up in Pulmonary clinic 09/25, CT chest to be done prior  to visit  -- Follow up with Surgery outpatient       Acute on chronic respiratory failure  Advanced COPD  Tobacco dependence  -- Has been on 4 L, baseline 3-4L NC at home  -- finished course of steroids for possible COPD exacerbation 8/28  -- PTA ICS/LABA  -- Pulmonology signed off  -- encouraged smoking cessation       Metabolic alkalosis:  Suspect primary metabolic alkalosis due to Lasix and poor oral intake  Held Lasix and given small IVF bolus 8/28  -- labs stable 8/29  -- resume lasix at decreased dose from 40mg PTA to 20mg tomorrow pending BNP results       Paroxysmal A-fib/a flutter; status post PPM  Aortic stenosis status post TAVR  Acute on chronic HFpEF now resolved after diuresis  TTE 4/2024 with EF 55-60%   -- Currently on metoprolol, diltiazem and digoxin with hold parameters  -- She is off amiodarone due to QTc prolongation  -- PTA lasix 40mg - held due to metabolic alkalosis  -- On Eliquis  -- Cardiology signed off 8/15      Hypomagnesemia: replace and recheck       Deconditioning   -- PT/OT recommend TCU       Normocytic anemia; overall stable       Hyperlipidemia: continue statin       DM2:  Not on home hypoglycemic meds  Last A1c 6.2  -- continue sliding scale insulin        Sacral erythema  Berna anal skin breakdown  -- Calmoseptine       Epigastric pain  Denies nausea, vomting  -- continue PPI, Tums prn and  Maalox prn               Diet: Fluid restriction 1800 ML FLUID  Regular Diet Adult    DVT Prophylaxis: DOAC  Chairez Catheter: Not present  Lines: PRESENT      PICC 08/10/24 Triple Lumen Right Brachial vein medial-Site Assessment: WDL      Cardiac Monitoring: None  Code Status: Full Code      Clinically Significant Risk Factors            # Hypomagnesemia: Lowest Mg = 1.6 mg/dL in last 2 days, will replace as needed   # Hypoalbuminemia: Lowest albumin = 2.2 g/dL at 8/18/2024  7:04 AM, will monitor as appropriate   # Thrombocytopenia: Lowest platelets = 124 in last 2 days, will monitor for  "bleeding   # Hypertension: Noted on problem list           # Overweight: Estimated body mass index is 25.94 kg/m  as calculated from the following:    Height as of 8/10/24: 1.651 m (5' 5\").    Weight as of this encounter: 70.7 kg (155 lb 13.8 oz).      # Financial/Environmental Concerns: none   # Pacemaker present             Disposition Plan   Medically Ready for Discharge: Anticipated Tomorrow      Donte Dutta DO  Hospitalist Service  [facility]  Securely message with [employer] (more info)  Text page via [employer] Paging/Directory   ______________________________________________________________________    Interval History   NAD. Complains of weakness, productive cough and lightheadedness    Physical Exam   Vital Signs: Temp: 98.8  F (37.1  C) Temp src: Oral BP: 128/55 Pulse: 86   Resp: 18 SpO2: 96 % O2 Device: Nasal cannula Oxygen Delivery: 5 LPM  Weight: 155 lbs 13.84 oz  General: NAD  RESPIRATORY: Breathing nonlabored  CARDIOVASCULAR: No le edema bilat.   ABDOMEN: soft and non-tender  NEUROLOGIC: Motor and sensory intact, speech clear         >50 MINUTES SPENT BY ME on the date of service doing chart review, history, exam, documentation & further activities per the note.  Data       "

## 2024-08-29 NOTE — LETTER
August 29, 2024      MARY KAY HINDS  [address] [place] [id number]      Dear Mary Kay:    At [facility], we re dedicated to improving your health and wellness. Enclosed is the Care Plan developed with you on 08/07/2024. Please review the Care Plan carefully.    As a reminder, during your visit we talked about:  Ways to manage your physical and mental health  Using health care to maintain and improve your health   Your preventive care needs     Remember to contact your care coordinator if you:  Are hospitalized, or plan to be hospitalized   Have a fall    Have a change in your physical or mental health  Need help finding support or services    If you have questions, or don t agree with your Care Plan, call me at [phone]. You can also call me if your needs change. TTY users, call the [place] [employer] at (620) or 1-985.658.5444 (glmlfq-ma-rcwwxz relay service).    Sincerely,    Samantha Alexandra RN, PHN  [phone]  [email or web address] ([facility]) is a health plan that contracts with both Medicare and the [place] Medical Assistance (Medicaid) program to provide benefits of both programs to enrollees. Enrollment in [facility] depends on contract renewal.    [id number] accepted    [id number] (07/2022)

## 2024-08-29 NOTE — PLAN OF CARE
Goal Outcome Evaluation:           Problem: Gas Exchange Impaired  Goal: Optimal Gas Exchange  Outcome: Progressing  Intervention: Optimize Oxygenation and Ventilation  Recent Flowsheet Documentation  Taken 8/29/2024 1227 by Jennifer Escobar RN  Head of Bed (HOB) Positioning: HOB at 20-30 degrees  Taken 8/29/2024 1031 by Jennifer Escobar RN  Head of Bed (HOB) Positioning: HOB at 20-30 degrees     Problem: Dysrhythmia  Goal: Normalized Cardiac Rhythm  Outcome: Progressing  Intervention: Monitor and Manage Cardiac Rhythm Effect  Recent Flowsheet Documentation  Taken 8/29/2024 0815 by Jennifer Escobar RN  VTE Prevention/Management: SCDs off (sequential compression devices)       Pt is A/O X4 today , alittle forgetful, denied pain, main complain was feeling dizzy, orthostatic BP was fine, took trazodone later in the morning for shoulder and right flank pain, repositioning as needed, pt ate breakfast fine and denied lunch, has been voiding fine with the pure wick, still on 4 L nc , sating 94%, 1800 flid restriction.

## 2024-08-29 NOTE — PROGRESS NOTES
[facility] Care Coordination Contact    8/28/2024 Received after visit chart from care coordinator with PCA assessment.    Completed following tasks: Mailed copy of support plan to member, Mailed [place] Choices signature sheet pages 3-4, Mailed Safe Medication Disposal , Submitted referrals/auths for [employer], PERS, Wipes, and Updated services in Database  , Provider Signature - No Support Plan Shared:  Member indicates that they do not want their support plan shared with any [facility] providers.    [facility]:  Emailed required PCA documents to [facility].  Faxed copy of PCA assessment to [facility] Agency and mailed copy to member.  Faxed MD Communication to PCP.     Esha Gerardo  Care Management Specialist  [facility]  [phone]

## 2024-08-30 ENCOUNTER — APPOINTMENT (OUTPATIENT)
Dept: RADIOLOGY | Facility: HOSPITAL | Age: 74
DRG: 166 | End: 2024-08-30
Attending: INTERNAL MEDICINE
Payer: COMMERCIAL

## 2024-08-30 LAB
ANION GAP SERPL CALCULATED.3IONS-SCNC: 3 MMOL/L (ref 7–15)
BASE EXCESS BLDA CALC-SCNC: 26.2 MMOL/L (ref -3–3)
BUN SERPL-MCNC: 9.1 MG/DL (ref 8–23)
CALCIUM SERPL-MCNC: 8.5 MG/DL (ref 8.8–10.4)
CHLORIDE SERPL-SCNC: 97 MMOL/L (ref 98–107)
COHGB MFR BLD: 65.8 % (ref 95–96)
CREAT SERPL-MCNC: 0.42 MG/DL (ref 0.51–0.95)
EGFRCR SERPLBLD CKD-EPI 2021: >90 ML/MIN/1.73M2
ERYTHROCYTE [DISTWIDTH] IN BLOOD BY AUTOMATED COUNT: 19.6 % (ref 10–15)
ERYTHROCYTE [DISTWIDTH] IN BLOOD BY AUTOMATED COUNT: 19.9 % (ref 10–15)
ERYTHROCYTE [DISTWIDTH] IN BLOOD BY AUTOMATED COUNT: 20 % (ref 10–15)
GLUCOSE BLDC GLUCOMTR-MCNC: 116 MG/DL (ref 70–99)
GLUCOSE BLDC GLUCOMTR-MCNC: 119 MG/DL (ref 70–99)
GLUCOSE BLDC GLUCOMTR-MCNC: 142 MG/DL (ref 70–99)
GLUCOSE BLDC GLUCOMTR-MCNC: 156 MG/DL (ref 70–99)
GLUCOSE BLDC GLUCOMTR-MCNC: 184 MG/DL (ref 70–99)
GLUCOSE SERPL-MCNC: 160 MG/DL (ref 70–99)
HCO3 BLD-SCNC: 51 MMOL/L (ref 21–28)
HCO3 SERPL-SCNC: 42 MMOL/L (ref 22–29)
HCT VFR BLD AUTO: 22.8 % (ref 35–47)
HCT VFR BLD AUTO: 27.5 % (ref 35–47)
HCT VFR BLD AUTO: 27.9 % (ref 35–47)
HGB BLD-MCNC: 6.5 G/DL (ref 11.7–15.7)
HGB BLD-MCNC: 7.7 G/DL (ref 11.7–15.7)
HGB BLD-MCNC: 7.9 G/DL (ref 11.7–15.7)
MAGNESIUM SERPL-MCNC: 1.7 MG/DL (ref 1.7–2.3)
MCH RBC QN AUTO: 24.8 PG (ref 26.5–33)
MCH RBC QN AUTO: 25.1 PG (ref 26.5–33)
MCH RBC QN AUTO: 25.7 PG (ref 26.5–33)
MCHC RBC AUTO-ENTMCNC: 28 G/DL (ref 31.5–36.5)
MCHC RBC AUTO-ENTMCNC: 28.3 G/DL (ref 31.5–36.5)
MCHC RBC AUTO-ENTMCNC: 28.5 G/DL (ref 31.5–36.5)
MCV RBC AUTO: 89 FL (ref 78–100)
MCV RBC AUTO: 89 FL (ref 78–100)
MCV RBC AUTO: 90 FL (ref 78–100)
O2/TOTAL GAS SETTING VFR VENT: 60 %
PCO2 BLD: 78 MM HG (ref 35–45)
PH BLD: 7.42 [PH] (ref 7.35–7.45)
PLATELET # BLD AUTO: 105 10E3/UL (ref 150–450)
PLATELET # BLD AUTO: 111 10E3/UL (ref 150–450)
PLATELET # BLD AUTO: 90 10E3/UL (ref 150–450)
PO2 BLD: 38 MM HG (ref 80–105)
POTASSIUM SERPL-SCNC: 4 MMOL/L (ref 3.4–5.3)
RBC # BLD AUTO: 2.53 10E6/UL (ref 3.8–5.2)
RBC # BLD AUTO: 3.1 10E6/UL (ref 3.8–5.2)
RBC # BLD AUTO: 3.15 10E6/UL (ref 3.8–5.2)
SAO2 % BLDA: 64 % (ref 92–100)
SODIUM SERPL-SCNC: 142 MMOL/L (ref 135–145)
WBC # BLD AUTO: 3.9 10E3/UL (ref 4–11)
WBC # BLD AUTO: 3.9 10E3/UL (ref 4–11)
WBC # BLD AUTO: 4.5 10E3/UL (ref 4–11)

## 2024-08-30 PROCEDURE — 250N000013 HC RX MED GY IP 250 OP 250 PS 637: Performed by: STUDENT IN AN ORGANIZED HEALTH CARE EDUCATION/TRAINING PROGRAM

## 2024-08-30 PROCEDURE — 36600 WITHDRAWAL OF ARTERIAL BLOOD: CPT

## 2024-08-30 PROCEDURE — 272N000735 HC KIT, CIRCUIT WITH NEB, VOLERA

## 2024-08-30 PROCEDURE — 250N000013 HC RX MED GY IP 250 OP 250 PS 637: Performed by: SPECIALIST

## 2024-08-30 PROCEDURE — 250N000011 HC RX IP 250 OP 636: Performed by: INTERNAL MEDICINE

## 2024-08-30 PROCEDURE — P9045 ALBUMIN (HUMAN), 5%, 250 ML: HCPCS

## 2024-08-30 PROCEDURE — 83735 ASSAY OF MAGNESIUM: CPT | Performed by: INTERNAL MEDICINE

## 2024-08-30 PROCEDURE — 80048 BASIC METABOLIC PNL TOTAL CA: CPT | Performed by: SPECIALIST

## 2024-08-30 PROCEDURE — 999N000157 HC STATISTIC RCP TIME EA 10 MIN

## 2024-08-30 PROCEDURE — 250N000009 HC RX 250: Performed by: INTERNAL MEDICINE

## 2024-08-30 PROCEDURE — 85014 HEMATOCRIT: CPT | Performed by: INTERNAL MEDICINE

## 2024-08-30 PROCEDURE — 94640 AIRWAY INHALATION TREATMENT: CPT | Mod: 76

## 2024-08-30 PROCEDURE — 94640 AIRWAY INHALATION TREATMENT: CPT

## 2024-08-30 PROCEDURE — 82805 BLOOD GASES W/O2 SATURATION: CPT | Performed by: INTERNAL MEDICINE

## 2024-08-30 PROCEDURE — 250N000009 HC RX 250

## 2024-08-30 PROCEDURE — 99232 SBSQ HOSP IP/OBS MODERATE 35: CPT | Performed by: INTERNAL MEDICINE

## 2024-08-30 PROCEDURE — 99233 SBSQ HOSP IP/OBS HIGH 50: CPT | Performed by: INTERNAL MEDICINE

## 2024-08-30 PROCEDURE — 120N000001 HC R&B MED SURG/OB

## 2024-08-30 PROCEDURE — 94799 UNLISTED PULMONARY SVC/PX: CPT

## 2024-08-30 PROCEDURE — 250N000013 HC RX MED GY IP 250 OP 250 PS 637: Performed by: FAMILY MEDICINE

## 2024-08-30 PROCEDURE — 999N000156 HC STATISTIC RCP CONSULT EA 30 MIN

## 2024-08-30 PROCEDURE — 999N000215 HC STATISTIC HFNC ADULT NON-CPAP

## 2024-08-30 PROCEDURE — 87205 SMEAR GRAM STAIN: CPT | Performed by: INTERNAL MEDICINE

## 2024-08-30 PROCEDURE — 250N000011 HC RX IP 250 OP 636

## 2024-08-30 PROCEDURE — 85027 COMPLETE CBC AUTOMATED: CPT

## 2024-08-30 PROCEDURE — 71045 X-RAY EXAM CHEST 1 VIEW: CPT

## 2024-08-30 PROCEDURE — 272N000054 HC CANNULA HIGH FLOW, ADULT

## 2024-08-30 RX ORDER — ALBUTEROL SULFATE 0.83 MG/ML
2.5 SOLUTION RESPIRATORY (INHALATION) EVERY 4 HOURS PRN
Status: DISCONTINUED | OUTPATIENT
Start: 2024-08-30 | End: 2024-09-09 | Stop reason: HOSPADM

## 2024-08-30 RX ORDER — ACETYLCYSTEINE 200 MG/ML
2 SOLUTION ORAL; RESPIRATORY (INHALATION) EVERY 4 HOURS
Status: DISCONTINUED | OUTPATIENT
Start: 2024-08-30 | End: 2024-08-31

## 2024-08-30 RX ORDER — FUROSEMIDE 10 MG/ML
20 INJECTION INTRAMUSCULAR; INTRAVENOUS
Status: DISCONTINUED | OUTPATIENT
Start: 2024-08-30 | End: 2024-08-30

## 2024-08-30 RX ORDER — FUROSEMIDE 10 MG/ML
40 INJECTION INTRAMUSCULAR; INTRAVENOUS
Status: DISCONTINUED | OUTPATIENT
Start: 2024-08-30 | End: 2024-08-30

## 2024-08-30 RX ORDER — FUROSEMIDE 10 MG/ML
20 INJECTION INTRAMUSCULAR; INTRAVENOUS EVERY 8 HOURS
Status: DISCONTINUED | OUTPATIENT
Start: 2024-08-30 | End: 2024-08-31

## 2024-08-30 RX ORDER — FUROSEMIDE 10 MG/ML
40 INJECTION INTRAMUSCULAR; INTRAVENOUS ONCE
Status: COMPLETED | OUTPATIENT
Start: 2024-08-30 | End: 2024-08-30

## 2024-08-30 RX ADMIN — ATORVASTATIN CALCIUM 20 MG: 10 TABLET, FILM COATED ORAL at 20:40

## 2024-08-30 RX ADMIN — SODIUM CHLORIDE SOLN NEBU 3% 3 ML: 3 NEBU SOLN at 09:06

## 2024-08-30 RX ADMIN — ALBUMIN HUMAN 25 G: 0.05 INJECTION, SOLUTION INTRAVENOUS at 20:52

## 2024-08-30 RX ADMIN — LEVALBUTEROL HYDROCHLORIDE 1.25 MG: 1.25 SOLUTION RESPIRATORY (INHALATION) at 09:06

## 2024-08-30 RX ADMIN — ACETYLCYSTEINE 2 ML: 200 SOLUTION ORAL; RESPIRATORY (INHALATION) at 15:43

## 2024-08-30 RX ADMIN — IPRATROPIUM BROMIDE 0.5 MG: 0.5 SOLUTION RESPIRATORY (INHALATION) at 12:38

## 2024-08-30 RX ADMIN — GABAPENTIN 600 MG: 300 CAPSULE ORAL at 20:40

## 2024-08-30 RX ADMIN — SENNOSIDES AND DOCUSATE SODIUM 1 TABLET: 8.6; 5 TABLET ORAL at 20:40

## 2024-08-30 RX ADMIN — SUCRALFATE 1 G: 1 TABLET ORAL at 12:19

## 2024-08-30 RX ADMIN — DIGOXIN 125 MCG: 125 TABLET ORAL at 08:34

## 2024-08-30 RX ADMIN — GABAPENTIN 600 MG: 300 CAPSULE ORAL at 08:34

## 2024-08-30 RX ADMIN — ACETAMINOPHEN 650 MG: 325 TABLET, FILM COATED ORAL at 08:49

## 2024-08-30 RX ADMIN — SODIUM CHLORIDE SOLN NEBU 3% 3 ML: 3 NEBU SOLN at 19:48

## 2024-08-30 RX ADMIN — GABAPENTIN 600 MG: 300 CAPSULE ORAL at 14:48

## 2024-08-30 RX ADMIN — ACETAMINOPHEN 650 MG: 325 TABLET, FILM COATED ORAL at 19:39

## 2024-08-30 RX ADMIN — SUCRALFATE 1 G: 1 TABLET ORAL at 06:16

## 2024-08-30 RX ADMIN — APIXABAN 5 MG: 5 TABLET, FILM COATED ORAL at 20:40

## 2024-08-30 RX ADMIN — IPRATROPIUM BROMIDE 0.5 MG: 0.5 SOLUTION RESPIRATORY (INHALATION) at 19:48

## 2024-08-30 RX ADMIN — FLUTICASONE FUROATE AND VILANTEROL TRIFENATATE 1 PUFF: 200; 25 POWDER RESPIRATORY (INHALATION) at 08:34

## 2024-08-30 RX ADMIN — IPRATROPIUM BROMIDE 0.5 MG: 0.5 SOLUTION RESPIRATORY (INHALATION) at 15:43

## 2024-08-30 RX ADMIN — LIDOCAINE 1 PATCH: 4 PATCH TOPICAL at 20:42

## 2024-08-30 RX ADMIN — IPRATROPIUM BROMIDE 0.5 MG: 0.5 SOLUTION RESPIRATORY (INHALATION) at 09:06

## 2024-08-30 RX ADMIN — LEVALBUTEROL HYDROCHLORIDE 1.25 MG: 1.25 SOLUTION RESPIRATORY (INHALATION) at 15:43

## 2024-08-30 RX ADMIN — SODIUM CHLORIDE SOLN NEBU 3% 3 ML: 3 NEBU SOLN at 15:43

## 2024-08-30 RX ADMIN — LEVALBUTEROL HYDROCHLORIDE 1.25 MG: 1.25 SOLUTION RESPIRATORY (INHALATION) at 12:38

## 2024-08-30 RX ADMIN — Medication 1 SPRAY: at 08:35

## 2024-08-30 RX ADMIN — LEVALBUTEROL HYDROCHLORIDE 1.25 MG: 1.25 SOLUTION RESPIRATORY (INHALATION) at 19:47

## 2024-08-30 RX ADMIN — METOPROLOL TARTRATE 50 MG: 25 TABLET, FILM COATED ORAL at 08:34

## 2024-08-30 RX ADMIN — FUROSEMIDE 40 MG: 10 INJECTION, SOLUTION INTRAVENOUS at 06:45

## 2024-08-30 RX ADMIN — APIXABAN 5 MG: 5 TABLET, FILM COATED ORAL at 08:34

## 2024-08-30 RX ADMIN — SUCRALFATE 1 G: 1 TABLET ORAL at 16:09

## 2024-08-30 RX ADMIN — CEFTRIAXONE SODIUM 2 G: 2 INJECTION, POWDER, FOR SOLUTION INTRAMUSCULAR; INTRAVENOUS at 12:19

## 2024-08-30 RX ADMIN — PANTOPRAZOLE SODIUM 40 MG: 40 TABLET, DELAYED RELEASE ORAL at 06:16

## 2024-08-30 RX ADMIN — SODIUM CHLORIDE SOLN NEBU 3% 3 ML: 3 NEBU SOLN at 12:38

## 2024-08-30 RX ADMIN — ACETYLCYSTEINE 2 ML: 200 SOLUTION ORAL; RESPIRATORY (INHALATION) at 19:47

## 2024-08-30 ASSESSMENT — ACTIVITIES OF DAILY LIVING (ADL)
ADLS_ACUITY_SCORE: 44
ADLS_ACUITY_SCORE: 45
ADLS_ACUITY_SCORE: 44
ADLS_ACUITY_SCORE: 45
ADLS_ACUITY_SCORE: 44
ADLS_ACUITY_SCORE: 44

## 2024-08-30 NOTE — PROGRESS NOTES
INFECTIOUS DISEASE FOLLOW UP NOTE      ASSESSMENT:  History of COPD.  History of aortic stenosis status post TAVR.  Status post pacemaker placement  Recent history of COVID-19 infection complicated with secondary bacterial pneumonia in middle July 2024.  Sputum cultures on 7/13/2024 with E. coli.  Right upper lobe collapse status post thoracentesis on 7/15/2024.  Readmitted with recurrent right-sided pleural effusion status post bronchoscopy on 8/11/2024.  Cultures with E. coli and Candida albicans.  Pleural effusion cultures positive with Streptococcus pneumonia. Chest tube placed 8/11/24. Now s/p VATS, unable to inflate lung. Bronch deferred due to O2 status. Improving, CXR looking better.      Yeast from BAL typically colonizer.     Worsening oxygenation 8/29-30. Afebrile, normal WBC last check. Repeat CXR suggests interstitial edema. Pulmonary edema seems more likely than new or worsening infection.     PLAN:  Ceftriaxone should be adequate for pneumococcus and E coli. Probably do not need to cover anaerobes (no teeth).    Expect about 4 weeks IV antibiotics from chest tube placement, this would be until 9/8/24, will plan for 9/10, will need Chest X ray around that time and consideration of switch to PO augmentin until chest CT with pulmonary.     Diuresis planned. Will follow.     Ravindra Bacon MD  [facility]  Contact via [employer] or [facility] [phone]  ______________________________________________________________________    SUBJECTIVE / INTERVAL HISTORY: breathing has felt worse past 2 days. Now on HFNC. Coughs mostly in the morning. Temps ok. Resting when I entered room. Has abdominal pain. No diarrhea.     ROS: deconditioned. All other systems negative except as listed above.        OBJECTIVE:  BP 93/50 (BP Location: Left arm)   Pulse 85   Temp 97.7  F (36.5  C) (Oral)   Resp 22   Wt 70.7 kg (155 lb 13.8 oz)   LMP  (LMP Unknown)   SpO2 93%   BMI 25.94 kg/m          Vital Signs  Temp: 97.4  F (36.3  C)  Temp src: Oral  Resp: 20  Pulse: 82  Pulse Rate Source: Monitor  BP: 102/59  BP Location: Left arm    Temp (24hrs), Av.3  F (36.8  C), Min:97.4  F (36.3  C), Max:99.1  F (37.3  C)      GEN: No acute distress.  HFNC, although prongs were just outside her nares. Sat 93%.  RESPIRATORY:  Chest tube on right removed. normal respiratory effort. Pretty clear anterior.  CARDIOVASCULAR:  regular  ABDOMEN:  mildly tender RUQ  EXTREMITIES: No edema.  SKIN/HAIR/NAILS:  No rashes  IV: PICC        Antibiotics:  pip/tazo, fluc  On either zosyn or ceftri since 8/10    Pertinent labs:    Recent Labs   Lab 24  1044 24  0952 24  1203   WBC 4.5 3.9* 6.5   HGB 7.9* 6.5* 9.0*   HCT 27.9* 22.8* 32.0*   * 90* 124*        Recent Labs   Lab 24  0452 24  0700 24  0607    144 143   CO2 42* 42* 42*   BUN 9.1 12.1 11.3          Lab Results   Component Value Date    ALT 31 2024    AST 20 2024    ALKPHOS 105 2024         MICROBIOLOGY DATA:  Susceptibility data from last 90 days.  Collected Specimen Info Organism Ampicillin Ampicillin/Sulbactam Azithromycin Cefepime Ceftazidime Ceftriaxone Ceftriaxone (meningitis) Ceftriaxone (non-meningitis) Ciprofloxacin Clindamycin Gentamicin Levofloxacin Meropenem   24 Bronchial Alveolar Lavage from Lung, Upper Lobe, Right Escherichia coli                  Normal anna marie                24 Bronchial Alveolar Lavage from Lung, Right Yeast                24 Bronchial Alveolar Lavage from Lung, Lower Lobe, Right Escherichia coli                  Normal anna marie                24 Bronchial Alveolar Lavage from Lung, Right Candida albicans                08/10/24 Sputum from Expectorate Streptococcus pneumoniae    S     S  S   S   S      Escherichia coli  S  S   S  S  S    S   S  S  S   08/10/24 Pleural fluid from Pleural Cavity, Right Streptococcus pneumoniae    S     S  S   S   S     07/13/24 Sputum from Expectorate Escherichia coli  S  S   S  S  S    S   S  S  S     Normal anna marie                  Collected Specimen Info Organism Penicillin (meningitis) Penicillin (non-meningitis) Penicillin(oral) Piperacillin/Tazobactam Tobramycin Trimethoprim/Sulfamethoxazole  Vancomycin   08/11/24 Bronchial Alveolar Lavage from Lung, Upper Lobe, Right Escherichia coli            Normal anna marie          08/11/24 Bronchial Alveolar Lavage from Lung, Right Yeast          08/11/24 Bronchial Alveolar Lavage from Lung, Lower Lobe, Right Escherichia coli            Normal anna marie          08/11/24 Bronchial Alveolar Lavage from Lung, Right Candida albicans          08/10/24 Sputum from Expectorate Streptococcus pneumoniae  S  S  S     S     Escherichia coli     S  S  S    08/10/24 Pleural fluid from Pleural Cavity, Right Streptococcus pneumoniae  S  S  S     S   07/13/24 Sputum from Expectorate Escherichia coli     S  S  S      Normal anna marie              RADIOLOGY:  XR Chest Port 1 View    Result Date: 8/30/2024  EXAM: XR CHEST PORT 1 VIEW LOCATION: [facility] DATE: 8/30/2024 INDICATION: Worsening hypoxia. COMPARISON: 6/29/2024.     IMPRESSION: Prior increased left basilar opacification has mildly improved. Otherwise, no significant change of bilateral mid to lower lung predominant opacities / atelectasis and small pleural effusions. Interstitial prominence has increased, suggesting pulmonary edema. Stable cardiomediastinal silhouette. Pacemaker. TAVR. Right PICC tip over the distal SVC. No pneumothorax.     XR Chest Port 1 View    Result Date: 8/29/2024  EXAM: XR CHEST PORT 1 VIEW LOCATION: [facility] DATE: 8/29/2024 INDICATION: Hypoxia, cough, eval for intervel change COMPARISON: 8/27/2024 and multiple prior studies, CT chest 8/20/2024     IMPRESSION: Left costophrenic angle is clipped. Prior TAVR and dual lead left subclavian venous pacer. Right upper extremity PICC  line catheter is in good position. Increased opacification in the left lower lobe of concern for worsening pneumonia. Volume loss on the right with small effusion and moderate right infrahilar opacities are unchanged. No visible pneumothorax.    XR Chest Port 1 View    Result Date: 8/27/2024  EXAM: XR CHEST PORT 1 VIEW LOCATION: [facility] DATE: 8/27/2024 INDICATION: Recheck Chest tube, s p VATS 8 19, right lung re expansion with pulmonary toileting COMPARISON: 8/26/2024     IMPRESSION: Very minimal increase in aeration in the right lung. There still remains significant atelectasis right lung base with pleural fluid or thickening. Minimal pleural fluid or thickening left lung base with atelectasis. Cardiac enlargement. Pulmonary vascular congestion. Aortic calcification. AVR. Right PICC with tip in the low SVC. Left-sided cardiac pacemaker.    XR Chest Port 1 View    Result Date: 8/26/2024  EXAM: XR CHEST PORT 1 VIEW LOCATION: [facility] DATE: 8/26/2024 INDICATION: Recheck Chest tube, s p VATS 8 19, right lung re expansion with pulmonary toileting COMPARISON: 8/25/2024.     IMPRESSION: The heart is unchanged in appearance. The right PICC and dual-lead pacing device as well as the cardiac valvular prosthesis are all unchanged. The lungs are otherwise unchanged appearance    XR Chest Port 1 View    Result Date: 8/25/2024  EXAM: XR CHEST PORT 1 VIEW LOCATION: [facility] DATE: 8/25/2024 INDICATION: s p chest tube removal. COMPARISON: 8/5/2024 at 0616 hours     IMPRESSION: Prior seen right chest tube has been removed with no evidence for a pneumothorax. Otherwise the chest is stable.    XR Chest Port 1 View    Result Date: 8/25/2024  EXAM: XR CHEST PORTABLE 1 VIEW LOCATION: [facility] DATE: 08/25/2024 INDICATION: Recheck chest tube, status post VATS 08/19, right lung re-expansion with pulmonary toileting. COMPARISON:  Yesterday.     IMPRESSION: Right PICC line tip in the SVC. Right chest tube with tip projecting over the right upper lung. No pneumothorax. Endovascular aortic valve replacement. Left infraclavicular pacemaker with lead tips projecting over the right atrium and right ventricle. Heart size upper limits of normal. Mild to moderate pulmonary vascular congestion with bilateral pleural effusions and associated compressive atelectasis. Degenerative changes bilateral shoulders, greater on the right.     XR Chest Port 1 View    Result Date: 8/24/2024  EXAM: XR CHEST PORT 1 VIEW LOCATION: [facility] DATE: 8/24/2024 INDICATION: Recheck Chest tube, s p VATS 8 19, right lung re expansion with pulmonary toileting COMPARISON: 8/23/2024.     IMPRESSION: Right apical chest tube remains in position. Right PICC tip in the mid SVC. Post endovascular repair of the aortic valve. Left subclavian pacemaker unchanged. Improved aeration of the right lung. No pneumothorax visible. Small bilateral pleural effusions and atelectasis/infiltrate of both lower lungs. Interstitial prominence consistent with mild edema. Stable cardiomegaly.    XR Chest Port 1 View    Result Date: 8/23/2024  EXAM: XR CHEST PORT 1 VIEW LOCATION: [facility] DATE: 8/23/2024 INDICATION: Recheck chest tube and right lung s p VATS and placement of water seal done on 8 22 at 1220 COMPARISON: Yesterday     IMPRESSION: Postthoracotomy changes on the right with large bore chest tube. Right upper extremity PICC line catheter in good position. Dual-lead left subclavian venous pacer. TAVR.  Shallower inspiration., Likely little change in the small, loculated hydropneumothorax in the right. Small right apical cap appears unchanged. Stable left pleural effusion with infiltrate or atelectasis in the left base.     XR Chest Port 1 View    Result Date: 8/22/2024  EXAM: XR CHEST PORT 1 VIEW LOCATION: [facility]  DATE: 8/22/2024 INDICATION: Recheck chest tube and pleural effusions. Postop thoracotomy and empyema drainage. COMPARISON: CT chest and chest x-ray 8/20/2024 and older studies     IMPRESSION: Postthoracotomy changes on the right with large bore chest tube. Right upper extremity PICC line catheter in good position. Dual-lead left subclavian venous pacer. TAVR. Shallower inspiration., Likely little change in the small, loculated hydropneumothorax in the right. Small right apical cap appears slightly decreased from before. Increased atelectasis in the left base.    CT Chest w/o Contrast    Result Date: 8/20/2024  EXAM: CT CHEST W/O CONTRAST LOCATION: [facility] DATE: 8/20/2024 INDICATION: ?LLL mass or occlusion, unable to re expand lung. COMPARISON: 8/16/2024 TECHNIQUE: CT chest without IV contrast. Multiplanar reformats were obtained. Dose reduction techniques were used. Lack of intravenous contrast significantly limits exam. CONTRAST: None. FINDINGS: LUNGS AND PLEURA: Interval placement of large bore right-sided chest tube with decrease in right pleural effusion. There is a moderate loculated appearing residual. Small anterior pneumothorax now noted. There is improved aeration of the right upper lobe, with persistent right middle lobe and lower lobe collapse and mediastinal shift. Centrilobular emphysematous change with an upper lobe predominance. Increasing, now moderate left pleural effusion with compressive basilar atelectasis. Moderate retained secretions in the central airways. Significant retained secretions in right lower lobe bronchi. MEDIASTINUM/AXILLAE: Right-sided PICC line terminates in the distal SVC. Cardiac pacemaker. TAVR. CORONARY ARTERY CALCIFICATION: Severe. UPPER ABDOMEN: Right adrenal nodular hyperplasia. Significant enlargement of the left adrenal gland, potentially containing multiple low-attenuation nodules measuring up to 2 cm in size. Adenomatous hyperplasia favored.  MUSCULOSKELETAL: No acute bony abnormalities.     IMPRESSION: 1.  Decreased right pleural effusion status post chest tube placement, with moderate, likely loculated/complicated residual, as well as small associated right pneumothorax now evident. 2.  Reexpansion of the right upper lobe with persistent collapse of right middle and lower lobe segments. Significant retained secretions, greatest in the right lower lobe. 3.  Increasing left pleural effusion. 4.  Additional findings as above.     XR Chest Port 1 View    Result Date: 8/20/2024  EXAM: XR CHEST PORT 1 VIEW LOCATION: [facility] DATE: 8/20/2024 INDICATION: Recheck chest tube and pleural effusions. Postop thoracotomy and empyema drainage. COMPARISON: 8/19/2024 and older studies, CT chest 8/16/2024 and older studies     IMPRESSION: Postthoracotomy changes with large bore right-sided chest tube. Removal of the right basilar pigtail chest tube. Dual-lead left subclavian venous pacer, TAVR and right upper extremity PICC line catheter remain in good position. There has been only partial reexpansion of the right upper lobe and apparently non of the right lower lobe. Likely small pneumothorax outlining collapsed lung in the right lateral costophrenic angle. Small right apical pleural cap persists. Volume loss with marked shift of the mediastinum into the right chest has only slightly decreased. Minimal atelectasis in the left base behind the heart with trace effusion again noted. No signs of pneumonia or failure.    XR Chest Port 1 View    Result Date: 8/19/2024  EXAM: XR CHEST PORT 1 VIEW LOCATION: [facility] DATE: 8/19/2024 INDICATION: Recheck chest tube and pleural effusions COMPARISON: August 18, 2024     IMPRESSION: Stable pacemaker, TAVR, right chest tube, right PICC. Minimal change in near complete opacification of the right hemithorax with volume loss.    XR Chest Port 1 View    Result Date: 8/18/2024  EXAM: XR  CHEST PORT 1 VIEW LOCATION: [facility] DATE: 8/18/2024 INDICATION: Recheck chest tube and pleural effusions COMPARISON: 8/17/2024     IMPRESSION: No change since yesterday. Small caliber chest tube projected at the right lung base unchanged. Complete opacification of the right hemithorax with volume loss unchanged. Right PICC line tip in low SVC. Transvenous pacemaker. Hyperinflation left lung.    POC US Chest B-Scan    Limited Bedside Lung Ultrasound, performed and interpreted by me. Indication: empyema and dyspnea With the patient positioned supine, right posterior and lateral lung fields were examined for evidence of thoracic free fluid, pulmonary consolidation, and pulmonary edema. Findings: moderate right pleural effusion noted, mostly free flowing. Some debris noted (plankton sign) Chest tube visualized in the pleural effusion. Right lung atelectasis noted. No obvious loculations although scanning was limited due to presence of dressing. IMPRESSION: moderate to large right pleural effusion with chest tube in place. No obvious loculations noted on this limited bedside pleural/lung POCUS.      XR Chest Port 1 View    Result Date: 8/17/2024  EXAM: XR CHEST PORT 1 VIEW LOCATION: [facility] DATE: 8/17/2024 INDICATION: Recheck chest tube and pleural effusions COMPARISON: CT chest without contrast 08/16/2024, chest radiograph 08/15/2024     IMPRESSION: Stable position of the right basilar pigtail chest tube. Stable complete opacification of the right hemithorax with rib crowding compatible with large pleural effusion and associated collapse of the right lung. Right upper extremity PICC line  with tip overlying the mid aspect of the SVC. Cardiomediastinal silhouette is partially obscured by the above process however appears grossly stable. Aortic valve replacement. Stable position of the left chest pacemaker. Trace left pleural effusion. Streaky opacities at the left  lung base favoring atelectasis. No pneumothorax. Unchanged osseous structures.    CT Chest w/o Contrast    Result Date: 8/16/2024  EXAM: CT CHEST W/O CONTRAST LOCATION: [facility] DATE: 8/16/2024 INDICATION: Recheck chest tube placement, clogged, etc. and recheck effusion given patient increase O2 and tachycardia COMPARISON: 8/13/2024 TECHNIQUE: CT chest without IV contrast. Multiplanar reformats were obtained. Dose reduction techniques were used. CONTRAST: None. FINDINGS: LUNGS AND PLEURA: Large right pleural effusion increased from previous. Complete collapse of the right lung. The right mainstem, upper and lower lobe bronchi are essentially completely occluded increased from previous. Single right chest tube. Small left pleural effusion with passive atelectasis left lung base slightly increased from previous. MEDIASTINUM/AXILLAE: Transvenous pacemaker. Aortic valve prosthesis. Right PICC line tip in SVC. CORONARY ARTERY CALCIFICATION: Severe. UPPER ABDOMEN: No significant finding. MUSCULOSKELETAL: Unremarkable.     IMPRESSION: 1.  Large right pleural effusion increased from previous despite the presence of the chest tube. 2.  Complete collapse of the right lung with complete opacification of the right upper lobe, right lower lobe and right mainstem bronchus increased from previous. 3.  Small left pleural effusion increased from previous.     XR Chest 1 View    Result Date: 8/15/2024  EXAM: XR CHEST 1 VIEW LOCATION: [facility] DATE: 8/15/2024 INDICATION: Treated for empyema, s p chest tube , follow up pleural effusion COMPARISON: CT chest 8/13/2024, chest radiograph 8/11/2024     IMPRESSION: Unchanged position of the pigtail chest tube in the right lung base with similar complete opacification of the right hemithorax. Trace left effusion with dependent atelectasis. Background emphysema. Cardiac silhouette and mediastinal contours  are mostly obscured. Aortic  valve replacement. Left chest cardiac generator and leads are unchanged. Right upper extremity PICC tip terminates in the mid SVC.    XR Video Swallow with SLP or OT    Result Date: 8/14/2024  EXAM: XR VIDEO SWALLOW WITH SLP OR OT LOCATION: [facility] DATE: 8/14/2024 INDICATION: Difficulty swallowing. COMPARISON: None. TECHNIQUE: Routine swallow study with speech pathology using multiple barium thicknesses. RADIATION DOSE: Dose Area Product 23.86 microGy-m2 FINDINGS: Swallow study with Speech Pathology using multiple barium thicknesses. Penetration with thin liquids, which slightly improved with chin tuck maneuver and decreased swallow volumes. No definite aspiration visualized with trialed consistencies.     IMPRESSION: Penetration with thin liquids, however no aspiration visualized. Please see speech pathology report for further details and recommendations.    CT Chest w/o Contrast    Result Date: 8/14/2024  EXAM: CT CHEST W/O CONTRAST LOCATION: [facility] DATE: 8/13/2024 INDICATION: empyema s p chest tube, ?evacuated COMPARISON: 8/10/2024 TECHNIQUE: CT chest without IV contrast. Multiplanar reformats were obtained. Dose reduction techniques were used. CONTRAST: None. FINDINGS: LUNGS AND PLEURA: Right basilar pigtail pleural drain in place. Large right pleural effusion with complete atelectasis of the right lung. A few foci of gas in the pleural space. Small left pleural effusion. Emphysema. Mild frothy secretions in trachea. Debris occludes the central right lung airways. MEDIASTINUM/AXILLAE: Stable left subclavian approach pacemaker and TAVR. Right PICC with tip near the cavoatrial junction. Rightward mediastinal shift. No lymphadenopathy. CORONARY ARTERY CALCIFICATION: Moderate. UPPER ABDOMEN: Unchanged thickening of the left adrenal gland. MUSCULOSKELETAL: Unremarkable     IMPRESSION: 1.  Large right pleural effusion with right lung collapse.     US Abdomen  Limited    Result Date: 8/12/2024  EXAM: US ABDOMEN LIMITED LOCATION: [facility] DATE: 8/12/2024 INDICATION: Abnormal LFTs. COMPARISON: CT 8/10/2024 TECHNIQUE: Limited abdominal ultrasound. FINDINGS: Study is limited by a chest tube and a compression dressing on the right side. Within limitations, findings are as follows. GALLBLADDER: Limited evaluation is within normal limits. No gallstones, wall thickening, or pericholecystic fluid. Negative sonographic Ospina's sign. BILE DUCTS: No biliary dilatation. The common duct measures 7 mm. LIVER: Normal parenchyma with smooth contour. No focal mass. RIGHT KIDNEY: Not seen due to bowel gas. PANCREAS: The pancreas is largely obscured by overlying gas. No ascites.     IMPRESSION: 1.  Normal limited abdominal ultrasound within limitations detailed above.     Cardiac Device Check - Remote    Result Date: 8/12/2024  Encounter Type: alert remote pacemaker transmission for AT/AF >/=6hrs for 2 days. Device: [employer] [place]. Pacing % /Programmed: AP 43%,  <1% at AAIR<=>DDDR 60/130 ppm. Lead(s): stable. Battery longevity: 14yrs, 3mo estimated. Presenting: Atrial flutter with ventricular sensing  bpm. Atrial high rates: since 4/29/24; 24 mode switch episodes, AT/AF appears to be in progress since 7/21/24 2:45PM, burden <1%, v-rates >/=120bpm ~95%. 19 fast A&V episodes, available EGMs appear to be RVR during AF. Anticoagulant: Eliquis. Ventricular High rates: since 4/29/24; None detected. Comments: Normal device function. Plan: Routed to device RN for review. MICHELLE Padgett, Device Specialist ADD: Transmission reviewed, see EPIC for details. Zofia Hodges RN  Device follow up for the entirety of having the Pacemaker, based on best practices determined by [employer] and in Compliance with Medicare guidelines. Continue remote device monitoring per patient plan. I have reviewed and interpreted the device interrogation, settings, programming, and  encounter summary. The device is functioning within normal device parameters. I agree with the current findings, assessment and plan.    XR Chest 1 View    Result Date: 8/11/2024  EXAM: XR CHEST 1 VIEW LOCATION: [facility] DATE: 08/11/2024 INDICATION: New chest tube placement. COMPARISON: 08/10/2024 CXR and CT chest.     IMPRESSION: Interval placement right basilar pleural drainage catheter. Previously seen large right pleural effusion has been nearly completely evacuated and the mid and lower right lung have partially reexpanded. No pneumothorax. Left lung clear. Mild cardiac enlargement. Transcatheter aortic valve replacement. Pacer anterior left chest wall with lead tips in the RA and RV. Right PICC tip RA/SVC junction.     XR Chest Port 1 View    Result Date: 8/10/2024  EXAM: XR CHEST PORT 1 VIEW LOCATION: [facility] DATE: 8/10/2024 INDICATION: RN placed PICC   verify tip placement COMPARISON: 8/10/2024     IMPRESSION: New right PICC with tip near the cavoatrial junction. Otherwise, no significant interval change.    XR Chest Port 1 View    Result Date: 8/10/2024  EXAM: XR CHEST PORTABLE 1 VIEW LOCATION: [facility] DATE: 08/10/2024 INDICATION: Status post right pigtail chest tube. COMPARISON: 08/10/2024 at 1010 hours.     IMPRESSION: No change in dual-lead cardiac pacer or TAVR. Right-sided chest tube has been placed since comparison study with decrease in the previously seen large right pleural effusion. A moderate to large pleural effusion remains. There is likely overlying compressive atelectasis of the inferior portion of the right lung with concurrent infectious process not excluded. There is some indistinctness of the pulmonary interstitium involving the left lower lobe which is new from prior study and may reflect airway inflammation or edema.     CT Chest w Contrast    Result Date: 8/10/2024  EXAM: CT CHEST W CONTRAST LOCATION:  [facility] DATE: 8/10/2024 INDICATION: SOB, large right-sided pleural effusion status post right pigtail COMPARISON: None. TECHNIQUE: CT chest with IV contrast. Multiplanar reformats were obtained. Dose reduction techniques were used. CONTRAST: 90 mL [id number] FINDINGS: LUNGS AND PLEURA: Large right effusion. Pigtail catheter is at the anterior right apex without significant fluid surrounding it. Extensive compressive atelectasis throughout the right lung. Left lung clear. MEDIASTINUM/AXILLAE: No lymphadenopathy. No thoracic aneurysm. CORONARY ARTERY CALCIFICATION: Moderate. UPPER ABDOMEN: No acute findings. MUSCULOSKELETAL: No frankly destructive bony lesions.     IMPRESSION: 1.  Large right effusion and extensive compressive atelectasis versus less likely infiltrate in the right lung. 2.  The pigtail catheter tip is at the anterior apex, most of the fluid is at the base.        XR Chest Port 1 View    Result Date: 8/10/2024  EXAM: XR CHEST PORT 1 VIEW LOCATION: [facility] DATE: 8/10/2024 INDICATION: sob, hypoxic, history of prior ptx, eval for ptx, pna or edema COMPARISON: 7/17/2020     IMPRESSION: Large right pleural effusion. No pneumothorax. Left subclavian approach pacing device. Prosthetic aortic valve. Cardiac silhouette within normal limits. No acute osseous abnormality.

## 2024-08-30 NOTE — PLAN OF CARE
"  Problem: Adult Inpatient Plan of Care  Goal: Plan of Care Review  Description: The Plan of Care Review/Shift note should be completed every shift.  The Outcome Evaluation is a brief statement about your assessment that the patient is improving, declining, or no change.  This information will be displayed automatically on your shift  note.  Outcome: Progressing  Goal: Patient-Specific Goal (Individualized)  Description: You can add care plan individualizations to a care plan. Examples of Individualization might be:  \"Parent requests to be called daily at 9am for status\", \"I have a hard time hearing out of my right ear\", or \"Do not touch me to wake me up as it startles  me\".  Outcome: Progressing  Goal: Absence of Hospital-Acquired Illness or Injury  Outcome: Progressing  Intervention: Identify and Manage Fall Risk  Recent Flowsheet Documentation  Taken 8/30/2024 0900 by Mala Caballero RN  Safety Promotion/Fall Prevention:   activity supervised   assistive device/personal items within reach  Intervention: Prevent Infection  Recent Flowsheet Documentation  Taken 8/30/2024 0900 by Mala Caballero RN  Infection Prevention: hand hygiene promoted  Goal: Optimal Comfort and Wellbeing  Outcome: Progressing  Intervention: Monitor Pain and Promote Comfort  Recent Flowsheet Documentation  Taken 8/30/2024 0849 by Mala Caballero RN  Pain Management Interventions: medication (see MAR)  Intervention: Provide Person-Centered Care  Recent Flowsheet Documentation  Taken 8/30/2024 0900 by Mala Caballero RN  Trust Relationship/Rapport:   care explained   choices provided  Goal: Readiness for Transition of Care  Outcome: Progressing     Problem: Gas Exchange Impaired  Goal: Optimal Gas Exchange  Outcome: Not Progressing     Problem: Dysrhythmia  Goal: Normalized Cardiac Rhythm  Outcome: Progressing     Problem: Comorbidity Management  Goal: Maintenance of COPD Symptom Control  Outcome: Progressing  Goal: Blood Glucose Levels Within Targeted " Range  Outcome: Progressing  Goal: Blood Pressure in Desired Range  Outcome: Progressing     Problem: Risk for Delirium  Goal: Optimal Coping  Outcome: Progressing  Goal: Improved Behavioral Control  Outcome: Progressing  Intervention: Minimize Safety Risk  Recent Flowsheet Documentation  Taken 8/30/2024 0900 by Mala Caballero RN  Communication Enhancement Strategies:   call light answered in person   communication board used  Trust Relationship/Rapport:   care explained   choices provided  Goal: Improved Attention and Thought Clarity  Outcome: Progressing  Intervention: Maximize Cognitive Function  Recent Flowsheet Documentation  Taken 8/30/2024 0900 by Mala Caballero RN  Reorientation Measures: clock in view  Goal: Improved Sleep  Outcome: Progressing     Problem: Respiratory Compromise (Pneumothorax)  Goal: Optimal Oxygenation and Ventilation  Outcome: Progressing  Intervention: Manage Pneumothorax Effects  Recent Flowsheet Documentation  Taken 8/30/2024 0900 by Mala Caballero RN  Level Incentive Spirometer (mL): 500  Number of Repetitions (IS): 10  Patient Tolerance (IS): fair     Problem: Skin Injury Risk Increased  Goal: Skin Health and Integrity  Outcome: Progressing  Intervention: Plan: Nurse Driven Intervention: Moisture Management  Recent Flowsheet Documentation  Taken 8/30/2024 0900 by Mala Caballero RN  Moisture Interventions:   Encourage regular toileting   No brief in bed   Urinary collection device  Intervention: Plan: Nurse Driven Intervention: Friction and Shear  Recent Flowsheet Documentation  Taken 8/30/2024 0900 by Mlaa Caballero RN  Friction/Shear Interventions: HOB 30 degrees or less  Intervention: Optimize Skin Protection  Recent Flowsheet Documentation  Taken 8/30/2024 0900 by Mala Caballero RN  Activity Management:   activity adjusted per tolerance   activity encouraged     Problem: Mobility Impairment  Goal: Optimal Mobility  Outcome: Progressing  Intervention: Optimize Mobility  Recent Flowsheet  Documentation  Taken 8/30/2024 0900 by Mala Caballero, RN  Activity Management:   activity adjusted per tolerance   activity encouraged     Problem: Fluid Volume Excess  Goal: Fluid Balance  Outcome: Progressing   Goal Outcome Evaluation:    Pt alert and oriented. At start of shift pt was on 45L HF , 70% Fio2 and sating mid to high 90s. Contacted RT to adjust setting. Pt now at 40L HF, 55% Fio2 and sats 90%. Pt lungs were course earlier in shift, now diminished. Cough and deep breathing with productive cough, brown thick sputum. ABGs abnormal, MD aware. Pt reports occasional mild SOB but she is comfortable and feels the HF O2 is helpful. Hgb 6.5 critical called, MD aware and redrew. Hgb 7.9. will recheck in AM. Pt declined getting up to chair. Limbs elevated. Heels red, blanching. Repositioned with pillows. Right old chest tube site pink, tender. Pt requested tylenol for discomfort. Eating will. Had BM this morning, brown soft. Remains on FR 1800. Unable to given scheduled diltiazem or lasix do to hypertension, MD aware. Continue to monitor.

## 2024-08-30 NOTE — PROGRESS NOTES
Increased oxygen need at bedtime. Denies sob/. O2sat stable on 8lpm. Scheduled neb treatments given by RT. Continue to monitor.

## 2024-08-30 NOTE — PROGRESS NOTES
[facility]    Medicine Progress Note - Hospitalist Service    Date of Admission:  8/17/2024    Assessment & Plan     74-year-old female with history of a flutter, COPD, ongoing tobacco abuse, previous pleural effusions and chronic pain admitted 8/17/24 with empyema and transferred from [facility] to [facility] for surgical management.     Empyema  Acute hypoxemic respiratory failure worse 8/29-8/30 and likely related to pulmonary edema from holding lasix  Sputum cultures on 7/13/2024 with E. coli.   H/O recurrent right-sided pleural effusion status post bronchoscopy on 8/11/2024 with cultures that grew E. coli and Candida albicans.   Pleural effusion cultures growing strep pneumo  s/p VATS 8/19/24. No true emphyema or rind found, no ability to expand lung  Repeat CT 08/20 showed right pleural effusion s/p chest tube placement, moderate likely loculated/complicated residual as well as small associated right pneumothorax, reexpansion of right upper lobe with persistent collapse of right middle and lower lobe segments.  Significant retained secretions greater in the right lower lobe and increasing left pleural effusion  Plan was for bronchoscopy 08/21, not done as patient was requiring upto 10L oxygen and had high risk of being intubated  Chest tube removed 08/25 8/29: patient with complaints of increased SOB, productive cough and weakness. CXR 8/29 shows Increased opacification in the left lower lobe of concern for worsening pneumonia. Of note patient has remained afebrile and WBC remains normal on IV ceftriaxone pneumococcus and E coli . Yeast on BAL thought to be colonization  Discussed with ID 8/29 and they recommend no changes  Overnight 8/29-8/30 patient with worsening hypoxemic respiratory failure.   CXR 8/30 with evidence of worsening pulmonary edema and possibly improved LLL opacity.  BNP elevated but improved from previous  -- start IV lasix, check ABG  -- pulmonary  consultation to assist with further cares  -- will discuss with ID if any antibiotic changes need to be made and if repeat sputum culture is needed  -- consider repeat chest CT  -- Continue Xopenex + ipratropium, hypertonic saline and flutter valve  -- current plan is for Ceftriaxone until 09/08 and then will need CXR and likely po Augmentin until follow up CT Chest with Pulmonary  --- seen by SLP, recommend regular/thin liquids  --- Follow up in Pulmonary clinic 09/25, CT chest to be done prior to visit  -- Follow up with Surgery outpatient       Acute on chronic respiratory failure  Advanced COPD  Tobacco dependence  -- Worse 8/29-8/30 as above, suspect mostly related to pulmonary edema after lasix was held for concern for contraction alkalosis  -- starting IV lasix 8/30  -- finished course of steroids for possible COPD exacerbation 8/28  -- PTA ICS/LABA  -- Pulmonology signed off but now re-consulted  -- check ABG        Metabolic alkalosis:  Suspect primary metabolic alkalosis due to Lasix and poor oral intake  Held Lasix and given small IVF bolus 8/28  -- labs stable 8/29-8/30  -- resume lasix as above  -- check ABG       Paroxysmal A-fib/a flutter; status post PPM  Aortic stenosis status post TAVR  Acute on chronic HFpEF now resolved after diuresis  TTE 4/2024 with EF 55-60%   -- Currently on metoprolol, diltiazem and digoxin with hold parameters  -- She is off amiodarone due to QTc prolongation  -- PTA lasix 40mg - held due to metabolic alkalosis  -- On Eliquis  -- Cardiology signed off 8/15      Acute on chronic anemia: likely hemodilutional from volume overload. CBC checked 8/30 and first draw was spurious since recheck Hgb shows more expected stability  -- trend CBC in AM for now       Deconditioning   -- PT/OT recommend TCU         Hyperlipidemia: continue statin       DM2:  Not on home hypoglycemic meds  Last A1c 6.2  -- continue sliding scale insulin        Sacral erythema  Berna anal skin breakdown  --  "Calmoseptine       Epigastric pain  Denies nausea, vomting  -- continue PPI, Tums prn and  Maalox prn               Diet: Fluid restriction 1800 ML FLUID  Regular Diet Adult    DVT Prophylaxis: DOAC  Chairez Catheter: Not present  Lines: PRESENT      PICC 08/10/24 Triple Lumen Right Brachial vein medial-Site Assessment: WDL      Cardiac Monitoring: None  Code Status: Full Code      Clinically Significant Risk Factors            # Hypomagnesemia: Lowest Mg = 1.6 mg/dL in last 2 days, will replace as needed   # Hypoalbuminemia: Lowest albumin = 2.2 g/dL at 8/18/2024  7:04 AM, will monitor as appropriate   # Thrombocytopenia: Lowest platelets = 124 in last 2 days, will monitor for bleeding   # Hypertension: Noted on problem list           # Overweight: Estimated body mass index is 25.94 kg/m  as calculated from the following:    Height as of 8/10/24: 1.651 m (5' 5\").    Weight as of this encounter: 70.7 kg (155 lb 13.8 oz).        # Financial/Environmental Concerns: none   # Pacemaker present             Disposition Plan   Medically Ready for Discharge: Anticipated in 2-4 Days      Donte Dutta DO  Hospitalist Service  [facility]  Securely message with [employer] (more info)  Text page via [employer] Paging/Directory   ______________________________________________________________________    Interval History   NAD. Complains of increased SOB and productive cough this AM    Physical Exam   Vital Signs: Temp: 98  F (36.7  C) Temp src: Oral BP: 115/59 Pulse: 87   Resp: 18 SpO2: 99 % O2 Device: High Flow Nasal Cannula (HFNC) Oxygen Delivery: 40 LPM  Weight: 155 lbs 13.84 oz  General: NAD  RESPIRATORY: Breathing labored  CARDIOVASCULAR: Trace le edema bilat.   ABDOMEN: soft and non-tender  NEUROLOGIC: Motor and sensory intact, speech clear         >50 MINUTES SPENT BY ME on the date of service doing chart review, history, exam, documentation & further activities per the note.  Data       "

## 2024-08-30 NOTE — SIGNIFICANT EVENT
Significant Event Note    Time of event: 1:20 AM August 30, 2024    Description of event:  Paged by nursing regarding patient having escalating oxygen requirements.    74-year-old female with history of a flutter, COPD, ongoing tobacco abuse, previous pleural effusions and chronic pain admitted 8/17/24 with empyema and transferred from [facility] to [facility] for surgical management.    Plan:  Empyema -E. Coli  COPD exacerbation  Patient admitted for a complicated lung infection including an empyema growing out E. coli as well as a COPD exacerbation.  She was transferred to [facility] for surgical management and had a chest tube in place which was removed 5 days ago.  Patient with some increasing oxygen requirements tonight now on high flow nasal cannula.  At this point I will continue with current plan of ceftriaxone as decided by infectious disease and primary team today.  Will adjust oxygen goals to 90% in the setting of COPD.  Patient more comfortable on high flow nasal cannula.  -Continue with current antibiotic regimen  -Oxygen goal 90% in the setting of COPD  -Continue with high flow nasal cannula    Discussed with: bedside nurse    Say Pedroza MD    Addendum  3:13 AM  Paged by nursing regarding patient requiring escalating oxygen requirements, now on HFNC 45L at 70%.     Went to bedside to evaluate.    Patient reports that she feels her breathing is better on the high flow nasal cannula.  She does not report any difficulty breathing right now.  No chest pain for her.    /59 (BP Location: Left arm)   Pulse 85   Temp 97.4  F (36.3  C) (Oral)   Resp 20   Wt 70.7 kg (155 lb 13.8 oz)   LMP  (LMP Unknown)   SpO2 90%   BMI 25.94 kg/m    GENERAL: Healthy, no distress.  Asleep on arrival  RESP: Good air movement.  No increased work of breathing.  CV: Heart RRR. No murmur  NEURO: Cranial nerves grossly intact.  Mentation and speech appropriate for age.  PSYCH: Mentation appears normal, affect  normal, judgement and insight intact    COPD exacerbation  Empyema E. Coli  Patient has been admitted for extended period of time and is being followed by infectious disease currently on ceftriaxone and being treated for a COPD exacerbation.  Patient has required more and more oxygen this evening.  Patient does not have any increased work of breathing however.  Discussed case with RT and they suspect that patient is losing a lot of the support of oxygen because her mouth is hanging open while sleeping, patient is sleeping with her mouth wide open when I arrived to the room.  At this point I suspect that her ascending oxygen requirements are more related to the fact that she is sleeping heavily and her mouth is wide open and we are not able to deliver oxygen as effectively with high flow nasal cannula.  At this time I do not see a need to broaden antibiotic coverage as patient does not seem to have worsening infectious symptoms and we have an explanation for the rising oxygen needs.  Patient states that she be willing to try BiPAP if that is needed in the future  -Continue to use high flow nasal cannula with a goal of 88 to 90% oxygen  -Patient would be open to trialing BiPAP if needed and we can use that if we are approaching the limits of high flow nasal cannula  -Overall, I believe that the patient is stable and this ascending oxygen requirements is more related to our equipment inability to live to deliver oxygen  -I believe that if we need it, BiPAP will likely deliver oxygen much more effectively for her but we can hold off on that for now    Discussed case with respiratory therapist and nursing staff

## 2024-08-30 NOTE — PROGRESS NOTES
Callpt room for increased O2 needs, pt on 15L oxymask satting in high 80s. Placed pt on HFNC 65% 40L sats increased to 92 patient is now resting.

## 2024-08-30 NOTE — SIGNIFICANT EVENT
Mary Kay has had an increase in O2 needs over this shift with complaints of increased shortness of breath. Respiratory therapy has been at the bedside monitoring and titrating  O2. See flow sheet. Dr. Pedroza evaluated her at the bedside as well. Patient did report some improvement in her shortness of breath with high flow o2.

## 2024-08-30 NOTE — PROGRESS NOTES
Patient back on HFNC 40LPM 50% due to desat on 15LPM oxymask. Currently sating 92%. RT will continue to monitor.

## 2024-08-30 NOTE — PROGRESS NOTES
Per NA patient took off HFNC because it was too hot. Placed patient back on 15LPM oxymask. Patient sating 90%. MD wants sats 88%-92%. HFNC on standby at bedside.

## 2024-08-30 NOTE — PLAN OF CARE
Problem: Adult Inpatient Plan of Care  Goal: Absence of Hospital-Acquired Illness or Injury  Intervention: Identify and Manage Fall Risk  Recent Flowsheet Documentation  Taken 8/29/2024 1700 by Lili Silva RN  Safety Promotion/Fall Prevention:   activity supervised   assistive device/personal items within reach   nonskid shoes/slippers when out of bed   safety round/check completed  Intervention: Prevent Skin Injury  Recent Flowsheet Documentation  Taken 8/29/2024 1830 by Lili Silva RN  Body Position:   turned   right  Goal: Optimal Comfort and Wellbeing  Intervention: Monitor Pain and Promote Comfort  Recent Flowsheet Documentation  Taken 8/29/2024 1602 by Lili Silva RN  Pain Management Interventions: medication offered but refused     Problem: Gas Exchange Impaired  Goal: Optimal Gas Exchange  Intervention: Optimize Oxygenation and Ventilation  Recent Flowsheet Documentation  Taken 8/29/2024 1830 by Lili Silva RN  Head of Bed (HOB) Positioning: HOB at 20-30 degrees     Problem: Comorbidity Management  Goal: Maintenance of COPD Symptom Control  Intervention: Maintain COPD Symptom Control  Recent Flowsheet Documentation  Taken 8/29/2024 1700 by Lili Silva RN  Medication Review/Management: medications reviewed  Goal: Blood Glucose Levels Within Targeted Range  Intervention: Monitor and Manage Glycemia  Recent Flowsheet Documentation  Taken 8/29/2024 1700 by Lili Silva RN  Medication Review/Management: medications reviewed  Goal: Blood Pressure in Desired Range  Intervention: Maintain Blood Pressure Management  Recent Flowsheet Documentation  Taken 8/29/2024 1700 by Lili Silva RN  Medication Review/Management: medications reviewed     Problem: Risk for Delirium  Goal: Improved Behavioral Control  Intervention: Minimize Safety Risk  Recent Flowsheet Documentation  Taken 8/29/2024 1700 by Lili Silva RN  Enhanced Safety Measures: review medications for side effects with  activity     Problem: Skin Injury Risk Increased  Goal: Skin Health and Integrity  Intervention: Plan: Nurse Driven Intervention: Moisture Management  Recent Flowsheet Documentation  Taken 2024 1700 by Lili Silva RN  Moisture Interventions:   Incontinence pad   Urinary collection device  Intervention: Plan: Nurse Driven Intervention: Friction and Shear  Recent Flowsheet Documentation  Taken 2024 1700 by Lili Silva RN  Friction/Shear Interventions:   HOB 30 degrees or less   Silicone foam sacral dressing  Intervention: Optimize Skin Protection  Recent Flowsheet Documentation  Taken 2024 1830 by Lili Silva RN  Head of Bed (HOB) Positioning: HOB at 20-30 degrees  Taken 2024 1700 by Lili Silva RN  Activity Management: patient refuses activity  Taken 2024 1602 by Lili Silva RN  Activity Management:   activity adjusted per tolerance   activity encouraged   Goal Outcome Evaluation:       Aox4, calm and cooperative.  E5ffyof at 4lpm O2 at start of shift, denies sob/. O2sat stable on 6lpm, continue to monitor. Coughing with encouragement.  Reinforced importance of breathing exercises and ambulation, pt refused. Reports feeling lightheaded when sitting/standing since yesterday. Orthostatics normal this morning.  Severe pain in right shoulder, lidocaine patch applied at bedtime. Refused prn meds.

## 2024-08-30 NOTE — TREATMENT PLAN
RCAT Treatment Plan    Patient Score: 13  Patient Acuity: 3    Clinical Indication for Therapy: history of bronchospasm and history of mucous producing disease    Therapy Ordered: Marjan COVINGTON     Assessment Summary: pt here with acute on chronic respiratory failure, hypoxemia, right empyema and pneumonia. She is a current smoker. CXR 8/30 left basil opacification mild improvement, bilater mid to lower lung opacities/atelectasis and small pleural effusions. RR 16-18, LS diminished, NPC, on HFNC 40 LPM 55% SpO2 90-91%. Pulmonary following. Will reassess in 3 days or as needed.      Thanh Perez, RT  8/30/2024

## 2024-08-30 NOTE — PLAN OF CARE
Problem: Adult Inpatient Plan of Care  Goal: Plan of Care Review  Description: The Plan of Care Review/Shift note should be completed every shift.  The Outcome Evaluation is a brief statement about your assessment that the patient is improving, declining, or no change.  This information will be displayed automatically on your shift  note.  Outcome: Progressing     Problem: Gas Exchange Impaired  Goal: Optimal Gas Exchange  Outcome: Not Progressing  Intervention: Optimize Oxygenation and Ventilation  Recent Flowsheet Documentation  Taken 8/30/2024 0030 by Shalini Islas RN  Head of Bed (HOB) Positioning: HOB at 20-30 degrees   Goal Outcome Evaluation:       Patient denied pain over night. Alert and orientated x4. C/O mild shortness of breath. Increased O2 needs over night. Requiring high flow O2. See flowsheet. Patient reported improvement in her shortness of breath with high flow O2. Patient turned and repositioned herself in bed. Voiding using purwick.

## 2024-08-30 NOTE — PROGRESS NOTES
"Care Management Follow Up    Length of Stay (days): 12    Expected Discharge Date: 09/01/2024     Concerns to be Addressed: Care progression - discharge planning     Patient plan of care discussed at interdisciplinary rounds: Yes    Anticipated Discharge Disposition:  Transitional care - [facility]     Anticipated Discharge Services:  Transitional care - [facility]  Anticipated Discharge DME:  NA    Patient/family educated on Medicare website which has current facility and service quality ratings:  Yes  Education Provided on the Discharge Plan:  Yes per team  Patient/Family in Agreement with the Plan:  Yes    Referrals Placed by CM/SW:  Yes  Private pay costs discussed: Transportation costs    Additional Information:  1019 rec'd a message from Destinee requesting for an update if patient is ready.    Message sent to Dr. Dutta    Social Hx: \"Transferred from . Reside in a home of her son/daughter-in-law Cynthia, who is pt's PCA. Family assists w/I/ADL. Accepted at [facility] (able to accept Mon). PAS completed. Family to transport.\"     RNCM to follow for medical progression, recommendations, and final discharge plan.     Ana Buitrago, RN     1034 rec'd message from Dr. Dutta to plan for Monday.    Message sent to update Destinee.    [facility] verified able to accept on holiday  "

## 2024-08-30 NOTE — PROGRESS NOTES
Pulmonary Progress Note  8/23/2024      Admit Date: 8/17/2024  CODE: Full Code    Reason for Consult: acute on chronic respiratory failure with hypoxemia, right empyema and pneumonia.    Assessment/Plan:   74F w/ active tobacco dependence, emphysema and presumed COPD with no formal PFTs, chronic hypoxemic resp failure on home O2, DM, fibromyalgia, moderate aortic stenosis, HFpEF, presented with large right sided effusion and right sided mucus plugging with Strep pneumo and E coli pneumonia, did not drain well with chest tube and intrapleural lytics, with persistent opacification of the right hemithorax, transferred to [facility] and underwent right VATS with chest tube placement on 8/19. Our service had signed off but overnight she became much more hypoxic. It is noted that her furosemide had been held one day and she had some improvement in her oxygenation with lasix. Remains very weak and deconditioned.     Plan:  - titrate FiO2 for goal SpO2 88-92%, avoid hyperoxia, has home oxygen  - Obtain Non-contrast CT Chest given worsening RLL opacity.  - Sputum culture with gram stain.  - Initiate mucomyst and metanebs QID with as needed albuterol.  - encourage OOB, PT/OT, push IS  - chest tube mgmt per surgery - hoping the tube can come out today.  - continue PTA ICS/LABA (breo in place of symbicort)  - abx per ID  - continue diuresis per [facility]  - has follow up in our pulmonary clinic on 9/25. We will arrange for a CT chest to be done prior to that visit.     Charlotte Paredes MD  Pulmonary and Critical Care  [facility]    Subjective/Interim Events:   Feels very weak and short of breath.    Medications:     Current Facility-Administered Medications   Medication Dose Route Frequency Provider Last Rate Last Admin     Current Facility-Administered Medications   Medication Dose Route Frequency Provider Last Rate Last Admin    apixaban ANTICOAGULANT (ELIQUIS) tablet 5 mg  5 mg Oral BID Lauren Willson MD   5 mg at 08/30/24  0834    artificial saliva (BIOTENE MT) solution 1 spray  1 spray Mouth/Throat 4x Daily Renée Soriano MD   1 spray at 08/30/24 1219    atorvastatin (LIPITOR) tablet 20 mg  20 mg Oral At Bedtime Renée Soriano MD   20 mg at 08/29/24 2112    cefTRIAXone (ROCEPHIN) 2 g vial to attach to  ml bag for ADULTS or NS 50 ml bag for PEDS  2 g Intravenous Q24H Ravindra Bacon MD   2 g at 08/30/24 1219    digoxin (LANOXIN) tablet 125 mcg  125 mcg Oral Daily Renée Soriano MD   125 mcg at 08/30/24 0834    diltiazem ER COATED BEADS (CARDIZEM CD/CARTIA XT) 24 hr capsule 120 mg  120 mg Oral Daily Renée Soriano MD   120 mg at 08/29/24 1203    fluticasone-vilanterol (BREO ELLIPTA) 200-25 MCG/ACT inhaler 1 puff  1 puff Inhalation Daily Renée Soriano MD   1 puff at 08/30/24 0834    furosemide (LASIX) injection 20 mg  20 mg Intravenous Q8H Donte Dutta DO        [Held by provider] furosemide (LASIX) tablet 20 mg  20 mg Oral Daily Donte Dutta DO        gabapentin (NEURONTIN) capsule 600 mg  600 mg Oral TID Renée Soriano MD   600 mg at 08/30/24 1448    insulin aspart (NovoLOG) injection (RAPID ACTING)  1-10 Units Subcutaneous TID AC Renée Soriano MD   1 Units at 08/30/24 0832    insulin aspart (NovoLOG) injection (RAPID ACTING)  1-7 Units Subcutaneous At Bedtime Renée Soriano MD   1 Units at 08/28/24 2133    levalbuterol (XOPENEX) neb solution 1.25 mg  1.25 mg Nebulization 4x Daily Aamir Ross G, RT   1.25 mg at 08/30/24 1238    And    ipratropium (ATROVENT) 0.02 % neb solution 0.5 mg  0.5 mg Nebulization 4x daily Aamir Ross G, RT   0.5 mg at 08/30/24 1238    Lidocaine (LIDOCARE) 4 % Patch 1 patch  1 patch Transdermal Q24h Anotnia Baptiste MD   1 patch at 08/29/24 2113    metoprolol tartrate (LOPRESSOR) tablet 50 mg  50 mg Oral BID Renée Soriano MD   50 mg at 08/30/24 0834    pantoprazole (PROTONIX) EC tablet 40 mg  40 mg Oral QAM AC Renée Soriano MD   40 mg at 08/30/24 0616    polyethylene glycol  (MIRALAX) Packet 17 g  17 g Oral Daily Renée Soriano MD   17 g at 08/28/24 0803    senna-docusate (SENOKOT-S/PERICOLACE) 8.6-50 MG per tablet 1 tablet  1 tablet Oral BID Renée Soriano MD   1 tablet at 08/29/24 0806    sodium chloride (NEBUSAL) 3 % neb solution 3 mL  3 mL Nebulization 4x daily Carmelita Buckley MD   3 mL at 08/30/24 1238    sodium chloride (PF) 0.9% PF flush 10 mL  10 mL Intracatheter Q8H Lauren Willson MD   10 mL at 08/30/24 0850    sodium chloride (PF) 0.9% PF flush 10-40 mL  10-40 mL Intracatheter Q7 Days Renée Soriano MD   10 mL at 08/24/24 1700    sterile talc (STERITALC) powder for sclerosing 4 g  4 g INTRAPLEURAL Once Renée Soriano MD        sucralfate (CARAFATE) tablet 1 g  1 g Oral TID AC Renée Soriano MD   1 g at 08/30/24 1219         Exam/Data:   Vitals  BP 95/53   Pulse 82   Temp 98.1  F (36.7  C) (Oral)   Resp 18   Wt 70.7 kg (155 lb 13.8 oz)   LMP  (LMP Unknown)   SpO2 92%   BMI 25.94 kg/m       I/O last 3 completed shifts:  In: 1140 [P.O.:1140]  Out: 1700 [Urine:1700]  Weight change:   [unfilled]  FiO2 (%): 55 %, Resp: 18    EXAM:  Physical Exam  Gen: awake, alert, oriented, no distress  HEENT: NT, no AUGUST, raspy voice  CV: RRR, no m/g/r  Resp: diminished at right base. Chest tube site c/d/I. No wheezing.  Abd: soft, nontender, BS+  Skin: no rashes or lesions  Ext: no edema  Neuro: PERRL, nonfocal exam    ROS:  A 10-system review was obtained and is negative with the exception of the symptoms noted above.    DATA:    PFT DATA   None available    Micro  PCT wnl on 8/10  Viral swabs neg    Bronch/BAL 8/11  Culture + for e. Coli, pan-sensitive  1+ yeast, candida albicans  Total nuc cells 22, 65% PMN, 9% lymphs, 20% lining cells  Cytology neg for malignancy     Pleural fluid 8/11  Culture + for strep pneumo, pan-sensitive  Total nuc cells 1451  73% PMN, 22% lymphs, 5% mono/mac  Glucose 159    Protein 3.3  TG 19  Cyto from 8/19 neg for malignancy  PJP pCR neg      Sputum 8/12  2+ strep pneumo  1+ e. Coli, pan-sensitive    IMAGING:   XR Chest Port 1 View    Result Date: 8/18/2024  EXAM: XR CHEST PORT 1 VIEW LOCATION: [facility] DATE: 8/18/2024 INDICATION: Recheck chest tube and pleural effusions COMPARISON: 8/17/2024     IMPRESSION: No change since yesterday. Small caliber chest tube projected at the right lung base unchanged. Complete opacification of the right hemithorax with volume loss unchanged. Right PICC line tip in low SVC. Transvenous pacemaker. Hyperinflation left lung.    POC US Chest B-Scan    Limited Bedside Lung Ultrasound, performed and interpreted by me. Indication: empyema and dyspnea With the patient positioned supine, right posterior and lateral lung fields were examined for evidence of thoracic free fluid, pulmonary consolidation, and pulmonary edema. Findings: moderate right pleural effusion noted, mostly free flowing. Some debris noted (plankton sign) Chest tube visualized in the pleural effusion. Right lung atelectasis noted. No obvious loculations although scanning was limited due to presence of dressing. IMPRESSION: moderate to large right pleural effusion with chest tube in place. No obvious loculations noted on this limited bedside pleural/lung POCUS.      XR Chest Port 1 View    Result Date: 8/17/2024  EXAM: XR CHEST PORT 1 VIEW LOCATION: [facility] DATE: 8/17/2024 INDICATION: Recheck chest tube and pleural effusions COMPARISON: CT chest without contrast 08/16/2024, chest radiograph 08/15/2024     IMPRESSION: Stable position of the right basilar pigtail chest tube. Stable complete opacification of the right hemithorax with rib crowding compatible with large pleural effusion and associated collapse of the right lung. Right upper extremity PICC line  with tip overlying the mid aspect of the SVC. Cardiomediastinal silhouette is partially obscured by the above process however appears grossly stable.  Aortic valve replacement. Stable position of the left chest pacemaker. Trace left pleural effusion. Streaky opacities at the left lung base favoring atelectasis. No pneumothorax. Unchanged osseous structures.     Left lung/pleural US 8/23

## 2024-08-31 ENCOUNTER — APPOINTMENT (OUTPATIENT)
Dept: ULTRASOUND IMAGING | Facility: HOSPITAL | Age: 74
DRG: 166 | End: 2024-08-31
Attending: INTERNAL MEDICINE
Payer: COMMERCIAL

## 2024-08-31 ENCOUNTER — APPOINTMENT (OUTPATIENT)
Dept: CARDIOLOGY | Facility: HOSPITAL | Age: 74
DRG: 166 | End: 2024-08-31
Attending: INTERNAL MEDICINE
Payer: COMMERCIAL

## 2024-08-31 ENCOUNTER — APPOINTMENT (OUTPATIENT)
Dept: CT IMAGING | Facility: HOSPITAL | Age: 74
DRG: 166 | End: 2024-08-31
Attending: INTERNAL MEDICINE
Payer: COMMERCIAL

## 2024-08-31 LAB
% LINING CELLS, BODY FLUID: 11 %
ANION GAP SERPL CALCULATED.3IONS-SCNC: 3 MMOL/L (ref 7–15)
APPEARANCE FLD: CLEAR
BASE EXCESS BLDV CALC-SCNC: 25.7 MMOL/L (ref -3–3)
BUN SERPL-MCNC: 8.8 MG/DL (ref 8–23)
CALCIUM SERPL-MCNC: 9 MG/DL (ref 8.8–10.4)
CELL COUNT BODY FLUID SOURCE: NORMAL
CHLORIDE SERPL-SCNC: 95 MMOL/L (ref 98–107)
COLOR FLD: YELLOW
CORTIS SERPL-MCNC: 19 UG/DL
CREAT SERPL-MCNC: 0.42 MG/DL (ref 0.51–0.95)
EGFRCR SERPLBLD CKD-EPI 2021: >90 ML/MIN/1.73M2
ERYTHROCYTE [DISTWIDTH] IN BLOOD BY AUTOMATED COUNT: 19.8 % (ref 10–15)
GLUCOSE BLDC GLUCOMTR-MCNC: 106 MG/DL (ref 70–99)
GLUCOSE BLDC GLUCOMTR-MCNC: 124 MG/DL (ref 70–99)
GLUCOSE BLDC GLUCOMTR-MCNC: 140 MG/DL (ref 70–99)
GLUCOSE BLDC GLUCOMTR-MCNC: 243 MG/DL (ref 70–99)
GLUCOSE BLDC GLUCOMTR-MCNC: 85 MG/DL (ref 70–99)
GLUCOSE BLDC GLUCOMTR-MCNC: 93 MG/DL (ref 70–99)
GLUCOSE BODY FLUID SOURCE: NORMAL
GLUCOSE FLD-MCNC: 135 MG/DL
GLUCOSE SERPL-MCNC: 144 MG/DL (ref 70–99)
HCO3 BLDV-SCNC: 50 MMOL/L (ref 21–28)
HCO3 SERPL-SCNC: 46 MMOL/L (ref 22–29)
HCT VFR BLD AUTO: 28.8 % (ref 35–47)
HGB BLD-MCNC: 8 G/DL (ref 11.7–15.7)
INR PPP: 1.81 (ref 0.85–1.15)
LD BODY BODY FLUID SOURCE: NORMAL
LDH FLD L TO P-CCNC: 87 U/L
LDH SERPL L TO P-CCNC: 301 U/L (ref 0–250)
LVEF ECHO: NORMAL
LYMPHOCYTES NFR FLD MANUAL: 17 %
MAGNESIUM SERPL-MCNC: 1.7 MG/DL (ref 1.7–2.3)
MCH RBC QN AUTO: 24.5 PG (ref 26.5–33)
MCHC RBC AUTO-ENTMCNC: 27.8 G/DL (ref 31.5–36.5)
MCV RBC AUTO: 88 FL (ref 78–100)
MONOS+MACROS NFR FLD MANUAL: 67 %
NEUTS BAND NFR FLD MANUAL: 5 %
O2/TOTAL GAS SETTING VFR VENT: 50 %
OXYHGB MFR BLDV: 51 % (ref 70–75)
PCO2 BLDV: 76 MM HG (ref 40–50)
PH BLDV: 7.43 [PH] (ref 7.32–7.43)
PLATELET # BLD AUTO: 119 10E3/UL (ref 150–450)
PO2 BLDV: 30 MM HG (ref 25–47)
POTASSIUM SERPL-SCNC: 4 MMOL/L (ref 3.4–5.3)
PROT FLD-MCNC: 1.4 G/DL
PROT SERPL-MCNC: 5.9 G/DL (ref 6.4–8.3)
PROTEIN BODY FLUID SOURCE: NORMAL
RBC # BLD AUTO: 3.27 10E6/UL (ref 3.8–5.2)
SAO2 % BLDV: 52.7 % (ref 70–75)
SODIUM SERPL-SCNC: 144 MMOL/L (ref 135–145)
WBC # BLD AUTO: 4.3 10E3/UL (ref 4–11)
WBC # FLD AUTO: 295 /UL

## 2024-08-31 PROCEDURE — 94640 AIRWAY INHALATION TREATMENT: CPT

## 2024-08-31 PROCEDURE — 250N000011 HC RX IP 250 OP 636: Performed by: INTERNAL MEDICINE

## 2024-08-31 PROCEDURE — 85610 PROTHROMBIN TIME: CPT | Performed by: INTERNAL MEDICINE

## 2024-08-31 PROCEDURE — 94799 UNLISTED PULMONARY SVC/PX: CPT

## 2024-08-31 PROCEDURE — 5A09457 ASSISTANCE WITH RESPIRATORY VENTILATION, 24-96 CONSECUTIVE HOURS, CONTINUOUS POSITIVE AIRWAY PRESSURE: ICD-10-PCS | Performed by: INTERNAL MEDICINE

## 2024-08-31 PROCEDURE — 94640 AIRWAY INHALATION TREATMENT: CPT | Mod: 76

## 2024-08-31 PROCEDURE — 93321 DOPPLER ECHO F-UP/LMTD STD: CPT | Mod: 26 | Performed by: INTERNAL MEDICINE

## 2024-08-31 PROCEDURE — 83615 LACTATE (LD) (LDH) ENZYME: CPT | Performed by: INTERNAL MEDICINE

## 2024-08-31 PROCEDURE — 89051 BODY FLUID CELL COUNT: CPT | Performed by: INTERNAL MEDICINE

## 2024-08-31 PROCEDURE — 94660 CPAP INITIATION&MGMT: CPT

## 2024-08-31 PROCEDURE — 999N000215 HC STATISTIC HFNC ADULT NON-CPAP

## 2024-08-31 PROCEDURE — 250N000013 HC RX MED GY IP 250 OP 250 PS 637: Performed by: FAMILY MEDICINE

## 2024-08-31 PROCEDURE — 250N000009 HC RX 250: Performed by: INTERNAL MEDICINE

## 2024-08-31 PROCEDURE — 85014 HEMATOCRIT: CPT | Performed by: INTERNAL MEDICINE

## 2024-08-31 PROCEDURE — 93325 DOPPLER ECHO COLOR FLOW MAPG: CPT | Mod: 26 | Performed by: INTERNAL MEDICINE

## 2024-08-31 PROCEDURE — 88112 CYTOPATH CELL ENHANCE TECH: CPT | Mod: TC | Performed by: INTERNAL MEDICINE

## 2024-08-31 PROCEDURE — 93325 DOPPLER ECHO COLOR FLOW MAPG: CPT

## 2024-08-31 PROCEDURE — 71250 CT THORAX DX C-: CPT

## 2024-08-31 PROCEDURE — 84157 ASSAY OF PROTEIN OTHER: CPT | Performed by: INTERNAL MEDICINE

## 2024-08-31 PROCEDURE — 250N000009 HC RX 250

## 2024-08-31 PROCEDURE — 84155 ASSAY OF PROTEIN SERUM: CPT | Performed by: INTERNAL MEDICINE

## 2024-08-31 PROCEDURE — 80048 BASIC METABOLIC PNL TOTAL CA: CPT | Performed by: SPECIALIST

## 2024-08-31 PROCEDURE — 255N000002 HC RX 255 OP 636: Performed by: INTERNAL MEDICINE

## 2024-08-31 PROCEDURE — 250N000013 HC RX MED GY IP 250 OP 250 PS 637: Performed by: SPECIALIST

## 2024-08-31 PROCEDURE — 99232 SBSQ HOSP IP/OBS MODERATE 35: CPT | Performed by: INTERNAL MEDICINE

## 2024-08-31 PROCEDURE — 120N000001 HC R&B MED SURG/OB

## 2024-08-31 PROCEDURE — 87205 SMEAR GRAM STAIN: CPT | Performed by: INTERNAL MEDICINE

## 2024-08-31 PROCEDURE — 82945 GLUCOSE OTHER FLUID: CPT | Performed by: INTERNAL MEDICINE

## 2024-08-31 PROCEDURE — 83735 ASSAY OF MAGNESIUM: CPT | Performed by: INTERNAL MEDICINE

## 2024-08-31 PROCEDURE — 999N000185 HC STATISTIC TRANSPORT TIME EA 15 MIN

## 2024-08-31 PROCEDURE — 250N000013 HC RX MED GY IP 250 OP 250 PS 637: Performed by: INTERNAL MEDICINE

## 2024-08-31 PROCEDURE — 93308 TTE F-UP OR LMTD: CPT | Mod: 26 | Performed by: INTERNAL MEDICINE

## 2024-08-31 PROCEDURE — 0W9B3ZZ DRAINAGE OF LEFT PLEURAL CAVITY, PERCUTANEOUS APPROACH: ICD-10-PCS | Performed by: RADIOLOGY

## 2024-08-31 PROCEDURE — 99233 SBSQ HOSP IP/OBS HIGH 50: CPT | Performed by: INTERNAL MEDICINE

## 2024-08-31 PROCEDURE — 82533 TOTAL CORTISOL: CPT | Performed by: INTERNAL MEDICINE

## 2024-08-31 PROCEDURE — 999N000157 HC STATISTIC RCP TIME EA 10 MIN

## 2024-08-31 PROCEDURE — 272N000042 US THORACENTESIS

## 2024-08-31 PROCEDURE — 99222 1ST HOSP IP/OBS MODERATE 55: CPT | Performed by: INTERNAL MEDICINE

## 2024-08-31 PROCEDURE — 82805 BLOOD GASES W/O2 SATURATION: CPT

## 2024-08-31 PROCEDURE — 250N000013 HC RX MED GY IP 250 OP 250 PS 637: Performed by: STUDENT IN AN ORGANIZED HEALTH CARE EDUCATION/TRAINING PROGRAM

## 2024-08-31 RX ORDER — FUROSEMIDE 10 MG/ML
20 INJECTION INTRAMUSCULAR; INTRAVENOUS EVERY 12 HOURS
Status: DISCONTINUED | OUTPATIENT
Start: 2024-08-31 | End: 2024-09-01

## 2024-08-31 RX ORDER — ACETYLCYSTEINE 200 MG/ML
2 SOLUTION ORAL; RESPIRATORY (INHALATION) 4 TIMES DAILY
Status: DISCONTINUED | OUTPATIENT
Start: 2024-08-31 | End: 2024-09-08

## 2024-08-31 RX ORDER — MIDODRINE HYDROCHLORIDE 5 MG/1
5 TABLET ORAL ONCE
Status: COMPLETED | OUTPATIENT
Start: 2024-08-31 | End: 2024-08-31

## 2024-08-31 RX ADMIN — ACETYLCYSTEINE 2 ML: 200 SOLUTION ORAL; RESPIRATORY (INHALATION) at 16:26

## 2024-08-31 RX ADMIN — ACETAMINOPHEN 650 MG: 325 TABLET, FILM COATED ORAL at 02:19

## 2024-08-31 RX ADMIN — SODIUM CHLORIDE SOLN NEBU 3% 3 ML: 3 NEBU SOLN at 12:08

## 2024-08-31 RX ADMIN — ACETAMINOPHEN 650 MG: 325 TABLET, FILM COATED ORAL at 14:13

## 2024-08-31 RX ADMIN — IPRATROPIUM BROMIDE 0.5 MG: 0.5 SOLUTION RESPIRATORY (INHALATION) at 20:10

## 2024-08-31 RX ADMIN — IPRATROPIUM BROMIDE 0.5 MG: 0.5 SOLUTION RESPIRATORY (INHALATION) at 12:08

## 2024-08-31 RX ADMIN — GABAPENTIN 600 MG: 300 CAPSULE ORAL at 09:42

## 2024-08-31 RX ADMIN — LEVALBUTEROL HYDROCHLORIDE 1.25 MG: 1.25 SOLUTION RESPIRATORY (INHALATION) at 20:15

## 2024-08-31 RX ADMIN — IPRATROPIUM BROMIDE 0.5 MG: 0.5 SOLUTION RESPIRATORY (INHALATION) at 07:04

## 2024-08-31 RX ADMIN — LEVALBUTEROL HYDROCHLORIDE 1.25 MG: 1.25 SOLUTION RESPIRATORY (INHALATION) at 07:04

## 2024-08-31 RX ADMIN — SUCRALFATE 1 G: 1 TABLET ORAL at 16:23

## 2024-08-31 RX ADMIN — GABAPENTIN 600 MG: 300 CAPSULE ORAL at 14:13

## 2024-08-31 RX ADMIN — Medication 1 SPRAY: at 12:24

## 2024-08-31 RX ADMIN — ACETYLCYSTEINE 2 ML: 200 SOLUTION ORAL; RESPIRATORY (INHALATION) at 20:15

## 2024-08-31 RX ADMIN — APIXABAN 5 MG: 5 TABLET, FILM COATED ORAL at 20:59

## 2024-08-31 RX ADMIN — LEVALBUTEROL HYDROCHLORIDE 1.25 MG: 1.25 SOLUTION RESPIRATORY (INHALATION) at 12:08

## 2024-08-31 RX ADMIN — ACETAMINOPHEN 650 MG: 325 TABLET, FILM COATED ORAL at 09:41

## 2024-08-31 RX ADMIN — LIDOCAINE 1 PATCH: 4 PATCH TOPICAL at 20:54

## 2024-08-31 RX ADMIN — SODIUM CHLORIDE SOLN NEBU 3% 3 ML: 3 NEBU SOLN at 16:26

## 2024-08-31 RX ADMIN — SODIUM CHLORIDE SOLN NEBU 3% 3 ML: 3 NEBU SOLN at 20:11

## 2024-08-31 RX ADMIN — DIGOXIN 125 MCG: 125 TABLET ORAL at 09:44

## 2024-08-31 RX ADMIN — ALTEPLASE 2 MG: 2.2 INJECTION, POWDER, LYOPHILIZED, FOR SOLUTION INTRAVENOUS at 17:48

## 2024-08-31 RX ADMIN — SENNOSIDES AND DOCUSATE SODIUM 1 TABLET: 8.6; 5 TABLET ORAL at 20:58

## 2024-08-31 RX ADMIN — SUCRALFATE 1 G: 1 TABLET ORAL at 12:13

## 2024-08-31 RX ADMIN — FUROSEMIDE 20 MG: 10 INJECTION, SOLUTION INTRAMUSCULAR; INTRAVENOUS at 04:31

## 2024-08-31 RX ADMIN — IPRATROPIUM BROMIDE 0.5 MG: 0.5 SOLUTION RESPIRATORY (INHALATION) at 16:26

## 2024-08-31 RX ADMIN — FLUTICASONE FUROATE AND VILANTEROL TRIFENATATE 1 PUFF: 200; 25 POWDER RESPIRATORY (INHALATION) at 09:42

## 2024-08-31 RX ADMIN — ACETYLCYSTEINE 2 ML: 200 SOLUTION ORAL; RESPIRATORY (INHALATION) at 12:08

## 2024-08-31 RX ADMIN — PERFLUTREN 3 ML: 6.52 INJECTION, SUSPENSION INTRAVENOUS at 11:36

## 2024-08-31 RX ADMIN — PANTOPRAZOLE SODIUM 40 MG: 40 TABLET, DELAYED RELEASE ORAL at 07:31

## 2024-08-31 RX ADMIN — ATORVASTATIN CALCIUM 20 MG: 10 TABLET, FILM COATED ORAL at 20:58

## 2024-08-31 RX ADMIN — GABAPENTIN 600 MG: 300 CAPSULE ORAL at 20:58

## 2024-08-31 RX ADMIN — ACETYLCYSTEINE 2 ML: 200 SOLUTION ORAL; RESPIRATORY (INHALATION) at 07:09

## 2024-08-31 RX ADMIN — LEVALBUTEROL HYDROCHLORIDE 1.25 MG: 1.25 SOLUTION RESPIRATORY (INHALATION) at 16:26

## 2024-08-31 RX ADMIN — DILTIAZEM HYDROCHLORIDE 120 MG: 120 CAPSULE, EXTENDED RELEASE ORAL at 12:13

## 2024-08-31 RX ADMIN — SODIUM CHLORIDE SOLN NEBU 3% 3 ML: 3 NEBU SOLN at 07:03

## 2024-08-31 RX ADMIN — MIDODRINE HYDROCHLORIDE 5 MG: 5 TABLET ORAL at 17:50

## 2024-08-31 RX ADMIN — SUCRALFATE 1 G: 1 TABLET ORAL at 07:31

## 2024-08-31 RX ADMIN — APIXABAN 5 MG: 5 TABLET, FILM COATED ORAL at 09:44

## 2024-08-31 RX ADMIN — METOPROLOL TARTRATE 50 MG: 25 TABLET, FILM COATED ORAL at 09:43

## 2024-08-31 RX ADMIN — ALTEPLASE 2 MG: 2.2 INJECTION, POWDER, LYOPHILIZED, FOR SOLUTION INTRAVENOUS at 15:13

## 2024-08-31 RX ADMIN — CEFTRIAXONE SODIUM 2 G: 2 INJECTION, POWDER, FOR SOLUTION INTRAMUSCULAR; INTRAVENOUS at 12:13

## 2024-08-31 ASSESSMENT — ACTIVITIES OF DAILY LIVING (ADL)
ADLS_ACUITY_SCORE: 44
ADLS_ACUITY_SCORE: 45
ADLS_ACUITY_SCORE: 45
ADLS_ACUITY_SCORE: 44

## 2024-08-31 NOTE — SIGNIFICANT EVENT
Significant Event Note    Time of event: 6:45 AM August 31, 2024    Description of event:  Paged by the patient's nurse for critical lab value of CO2 at 46 increased from previous of 42. Patient CO2 continues to be trending upward. Still requiring high flow with some small adjustments per nursing staff and respiratory as needed to maintain oxygenation. Bedside nurse reports no significant change in respiratory status or oxygen demands.      Plan:  - Obtain VBG   - Notify provider if respiratory status changes acutely.     Discussed with: bedside nurse    Augusta Durant DO

## 2024-08-31 NOTE — PROVIDER NOTIFICATION
Received a call from lab with critical CO2 46.  Page sent out to house officer to update.  Awaiting response.

## 2024-08-31 NOTE — PROGRESS NOTES
[facility]    Medicine Progress Note - Hospitalist Service    Date of Admission:  8/17/2024    Assessment & Plan     74-year-old female with history of a flutter, COPD, ongoing tobacco abuse, previous pleural effusions and chronic pain admitted 8/17/24 with empyema and transferred from [facility] to [facility] for surgical management.     Empyema  Acute hypoxemic respiratory failure worse 8/29-8/30 and likely related to pulmonary edema from holding lasix  Sputum cultures on 7/13/2024 with E. coli.   H/O recurrent right-sided pleural effusion status post bronchoscopy on 8/11/2024 with cultures that grew E. coli and Candida albicans.   Pleural effusion cultures growing strep pneumo  s/p VATS 8/19/24. No true emphyema or rind found, no ability to expand lung  Repeat CT 08/20 showed right pleural effusion s/p chest tube placement, moderate likely loculated/complicated residual as well as small associated right pneumothorax, reexpansion of right upper lobe with persistent collapse of right middle and lower lobe segments.  Significant retained secretions greater in the right lower lobe and increasing left pleural effusion  Plan was for bronchoscopy 08/21, not done as patient was requiring upto 10L oxygen and had high risk of being intubated  Chest tube removed 08/25 8/29: patient with complaints of increased SOB, productive cough and weakness. CXR 8/29 shows Increased opacification in the left lower lobe of concern for worsening pneumonia. Of note patient has remained afebrile and WBC remains normal on IV ceftriaxone pneumococcus and E coli . Yeast on BAL thought to be colonization  Discussed with ID 8/29 and they recommend no changes  Overnight 8/29-8/30 patient with worsening hypoxemic respiratory failure.   CXR 8/30 with evidence of worsening pulmonary edema and possibly improved LLL opacity.  BNP elevated but improved from previous  CT chest 8/31 shows moderate left pleural effusion  and stable changes on right post surgery  -- plan thoracentesis 8/31  -- consult cardiology regarding difficult diuresis  -- check TTE  -- attempt to restart lasix for now, defer aldactone vs acetazolamide dosing to cardiology who will follow for BP improvement after thora  -- pulmonary consulted to assist with further cares  -- continue current nebs and pulmonary hygiene  -- ID following and recommends no changes to current antibiotic plan since current respiratory failure is volume related  -- current plan is for Ceftriaxone until 09/08 and then will need CXR and likely po Augmentin until follow up CT Chest with Pulmonary  --- seen by SLP, recommend regular/thin liquids  --- Follow up in Pulmonary clinic 09/25, plan CT chest to be done prior to visit  -- Follow up with Surgery outpatient       Acute on chronic respiratory failure  Advanced COPD  Tobacco dependence  -- see management as above       Metabolic alkalosis:  Suspect primary metabolic alkalosis due to Lasix and poor oral intake  Held Lasix and given small IVF bolus 8/28  -- labs stable 8/29-8/30  -- resume lasix as above and defer further diuretic plans to cardiology       Paroxysmal A-fib/a flutter; status post PPM  Aortic stenosis status post TAVR  Acute on chronic HFpEF now resolved after diuresis  TTE 4/2024 with EF 55-60%   -- Currently on metoprolol, diltiazem and digoxin with hold parameters  -- She is off amiodarone due to QTc prolongation  -- PTA lasix 40mg - held due to metabolic alkalosis  -- On Eliquis  -- Cardiology following  -- TTE pending      Acute on chronic anemia: likely hemodilutional from volume overload. CBC checked 8/30 and first draw was spurious since recheck Hgb shows more expected stability  -- trend CBC in AM for now       Deconditioning   -- PT/OT recommend TCU      Hyperlipidemia: continue statin       DM2:  Not on home hypoglycemic meds  Last A1c 6.2  -- continue sliding scale insulin        Sacral erythema  Berna anal skin  "breakdown  -- Calmoseptine       Epigastric pain  Denies nausea, vomting  -- continue PPI, Tums prn and  Maalox prn               Diet: Fluid restriction 1800 ML FLUID  Regular Diet Adult    DVT Prophylaxis: DOAC  Chairez Catheter: Not present  Lines: PRESENT      PICC 08/10/24 Triple Lumen Right Brachial vein medial-Site Assessment: WDL except;Positional      Cardiac Monitoring: None  Code Status: Full Code      Clinically Significant Risk Factors              # Hypoalbuminemia: Lowest albumin = 2.2 g/dL at 8/18/2024  7:04 AM, will monitor as appropriate  # Coagulation Defect: INR = 1.81 (Ref range: 0.85 - 1.15) and/or PTT = 38 Seconds (Ref range: 22 - 38 Seconds), will monitor for bleeding  # Thrombocytopenia: Lowest platelets = 90 in last 2 days, will monitor for bleeding   # Hypertension: Noted on problem list           # Overweight: Estimated body mass index is 25.94 kg/m  as calculated from the following:    Height as of 8/10/24: 1.651 m (5' 5\").    Weight as of this encounter: 70.7 kg (155 lb 13.8 oz).        # Financial/Environmental Concerns: none   # Pacemaker present             Disposition Plan   Medically Ready for Discharge: Anticipated in 2-4 Days      Donte Dutta DO  Hospitalist Service  [facility]  Securely message with [email or web address] (more info)  Text page via [email or web address] Paging/Directory   ______________________________________________________________________    Interval History   NAD. SOB and productive cough stable this AM    Physical Exam   Vital Signs: Temp: 98  F (36.7  C) Temp src: Oral BP: 107/59 Pulse: 86   Resp: 20 SpO2: 93 % O2 Device: High Flow Nasal Cannula (HFNC) Oxygen Delivery: 40 LPM  Weight: 155 lbs 13.84 oz  General: NAD  RESPIRATORY: Breathing labored  CARDIOVASCULAR: Trace le edema bilat.   ABDOMEN: soft and non-tender  NEUROLOGIC: Motor and sensory intact, speech clear         >50 MINUTES SPENT BY ME on the date of service doing chart review, history, exam, " documentation & further activities per the note.  Data

## 2024-08-31 NOTE — PROGRESS NOTES
Transported PT from P4 to CT and back to P4 via the BIPAP without incident.  PT currently on HFNC 40L 50%.    Percy Alonso, RT  8/31/2024

## 2024-08-31 NOTE — PROGRESS NOTES
PT was transported from  to [facility] and back to  via the BIPAP without incident.  Placed back on HFNC 40L 50%.    Percy Alonso, RT  8/31/2024

## 2024-08-31 NOTE — PROGRESS NOTES
Pulmonary Progress Note  8/23/2024      Admit Date: 8/17/2024  CODE: Full Code    Reason for Consult: acute on chronic respiratory failure with hypoxemia, right empyema and pneumonia.    Assessment/Plan:   74F w/ active tobacco dependence, emphysema and presumed COPD with no formal PFTs, chronic hypoxemic resp failure on home O2, DM, fibromyalgia, moderate aortic stenosis, HFpEF, presented with large right sided effusion and right sided mucus plugging with Strep pneumo and E coli pneumonia, did not drain well with chest tube and intrapleural lytics, with persistent opacification of the right hemithorax, transferred to [facility] and underwent right VATS with chest tube placement on 8/19. Our service had signed off but overnight she became much more hypoxic. It is noted that her furosemide had been held one day and she had some improvement in her oxygenation with lasix. Remains very weak and deconditioned. CT Chest from 8/31 demonstrates ongoing stable right pleural space but with a left sided pleural effusion and evidence of volume overload.    Plan:  - Cardiology has been re-involved to assist with volume management. Will undergo TTE today and potentially re initiation of spironolactone.  - Sputum culture with gram stain.  - Continue mucomyst and metanebs QID with as needed albuterol.  - encourage OOB, PT/OT, push IS  - continue PTA ICS/LABA (breo in place of symbicort)  - abx per ID  - continue diuresis per [facility] [place]  cardiology  - has follow up in our pulmonary clinic on 9/25. We will arrange for a CT chest to be done prior to that visit.     Charlotte Paredes MD  Pulmonary and Critical Care  [employer]    Subjective/Interim Events:   Feels very weak.    Medications:     Current Facility-Administered Medications   Medication Dose Route Frequency Provider Last Rate Last Admin     Current Facility-Administered Medications   Medication Dose Route Frequency Provider Last Rate Last Admin    acetylcysteine (MUCOMYST)  20 % nebulizer solution 2 mL  2 mL Nebulization 4x Daily Donte Dutta DO   2 mL at 08/31/24 0709    apixaban ANTICOAGULANT (ELIQUIS) tablet 5 mg  5 mg Oral BID Lauren Willson MD   5 mg at 08/31/24 0944    artificial saliva (BIOTENE MT) solution 1 spray  1 spray Mouth/Throat 4x Daily Renée Soriano MD   1 spray at 08/30/24 0835    atorvastatin (LIPITOR) tablet 20 mg  20 mg Oral At Bedtime Renée Soriano MD   20 mg at 08/30/24 2040    cefTRIAXone (ROCEPHIN) 2 g vial to attach to  ml bag for ADULTS or NS 50 ml bag for PEDS  2 g Intravenous Q24H Ravindra Bacon MD   2 g at 08/30/24 1219    digoxin (LANOXIN) tablet 125 mcg  125 mcg Oral Daily Renée Soriano MD   125 mcg at 08/31/24 0944    diltiazem ER COATED BEADS (CARDIZEM CD/CARTIA XT) 24 hr capsule 120 mg  120 mg Oral Daily Renée Soriano MD   120 mg at 08/29/24 1203    fluticasone-vilanterol (BREO ELLIPTA) 200-25 MCG/ACT inhaler 1 puff  1 puff Inhalation Daily Renée Soriano MD   1 puff at 08/31/24 0942    furosemide (LASIX) injection 20 mg  20 mg Intravenous Q12H Donte Dutta DO        gabapentin (NEURONTIN) capsule 600 mg  600 mg Oral TID Renée Soriano MD   600 mg at 08/31/24 0942    insulin aspart (NovoLOG) injection (RAPID ACTING)  1-10 Units Subcutaneous TID AC Renée Soriano MD   1 Units at 08/30/24 1726    insulin aspart (NovoLOG) injection (RAPID ACTING)  1-7 Units Subcutaneous At Bedtime Renée Soriano MD   1 Units at 08/28/24 2133    levalbuterol (XOPENEX) neb solution 1.25 mg  1.25 mg Nebulization 4x Daily Aamir Ross RT   1.25 mg at 08/31/24 0704    And    ipratropium (ATROVENT) 0.02 % neb solution 0.5 mg  0.5 mg Nebulization 4x daily Aamir Ross RT   0.5 mg at 08/31/24 0704    Lidocaine (LIDOCARE) 4 % Patch 1 patch  1 patch Transdermal Q24h Antonia Baptiste MD   1 patch at 08/30/24 2042    metoprolol tartrate (LOPRESSOR) tablet 50 mg  50 mg Oral BID Renée Soriano MD   50 mg at 08/31/24 0943    pantoprazole  (PROTONIX) EC tablet 40 mg  40 mg Oral QAM AC Renée Soriano MD   40 mg at 08/31/24 0731    perflutren lipid microsphere (DEFINITY) injection SUSP   Intravenous Once Sukumar Swanson MD        polyethylene glycol (MIRALAX) Packet 17 g  17 g Oral Daily Renée Soriano MD   17 g at 08/28/24 0803    senna-docusate (SENOKOT-S/PERICOLACE) 8.6-50 MG per tablet 1 tablet  1 tablet Oral BID Renée Soriano MD   1 tablet at 08/30/24 2040    sodium chloride (NEBUSAL) 3 % neb solution 3 mL  3 mL Nebulization 4x daily Carmelita Buckley MD   3 mL at 08/31/24 0703    sodium chloride (PF) 0.9% PF flush 10 mL  10 mL Intracatheter Q8H Lauren Willson MD   10 mL at 08/31/24 0954    sodium chloride (PF) 0.9% PF flush 10-40 mL  10-40 mL Intracatheter Q7 Days Renée Soriano MD   10 mL at 08/24/24 1700    sterile talc (STERITALC) powder for sclerosing 4 g  4 g INTRAPLEURAL Once Renée Soriano MD        sucralfate (CARAFATE) tablet 1 g  1 g Oral TID AC Renée Soriano MD   1 g at 08/31/24 0731         Exam/Data:   Vitals  /55   Pulse 86   Temp 98  F (36.7  C) (Oral)   Resp 20   Wt 70.7 kg (155 lb 13.8 oz)   LMP  (LMP Unknown)   SpO2 94%   BMI 25.94 kg/m       I/O last 3 completed shifts:  In: 720 [P.O.:720]  Out: 1300 [Urine:1300]  Weight change:   [unfilled]  FiO2 (%): 50 %, Resp: 20    EXAM:  Physical Exam  Constitutional:       Comments: Very weak appearing.   HENT:      Head: Normocephalic.      Mouth/Throat:      Mouth: Mucous membranes are dry.   Cardiovascular:      Rate and Rhythm: Normal rate and regular rhythm.      Heart sounds: Normal heart sounds.   Pulmonary:      Effort: Pulmonary effort is normal.      Breath sounds: Normal breath sounds.   Abdominal:      General: Abdomen is flat.      Palpations: Abdomen is soft.   Skin:     General: Skin is warm.   Neurological:      General: No focal deficit present.      Mental Status: She is alert and oriented to person, place, and time.         ROS:  A  10-system review was obtained and is negative with the exception of the symptoms noted above.    DATA:    PFT DATA   None available    Micro  PCT wnl on 8/10  Viral swabs neg    Bronch/BAL 8/11  Culture + for e. Coli, pan-sensitive  1+ yeast, candida albicans  Total nuc cells 22, 65% PMN, 9% lymphs, 20% lining cells  Cytology neg for malignancy     Pleural fluid 8/11  Culture + for strep pneumo, pan-sensitive  Total nuc cells 1451  73% PMN, 22% lymphs, 5% mono/mac  Glucose 159    Protein 3.3  TG 19  Cyto from 8/19 neg for malignancy  PJP pCR neg     Sputum 8/12  2+ strep pneumo  1+ e. Coli, pan-sensitive    IMAGING:   XR Chest Port 1 View    Result Date: 8/18/2024  EXAM: XR CHEST PORT 1 VIEW LOCATION: [facility] DATE: 8/18/2024 INDICATION: Recheck chest tube and pleural effusions COMPARISON: 8/17/2024     IMPRESSION: No change since yesterday. Small caliber chest tube projected at the right lung base unchanged. Complete opacification of the right hemithorax with volume loss unchanged. Right PICC line tip in low SVC. Transvenous pacemaker. Hyperinflation left lung.    POC US Chest B-Scan    Limited Bedside Lung Ultrasound, performed and interpreted by me. Indication: empyema and dyspnea With the patient positioned supine, right posterior and lateral lung fields were examined for evidence of thoracic free fluid, pulmonary consolidation, and pulmonary edema. Findings: moderate right pleural effusion noted, mostly free flowing. Some debris noted (plankton sign) Chest tube visualized in the pleural effusion. Right lung atelectasis noted. No obvious loculations although scanning was limited due to presence of dressing. IMPRESSION: moderate to large right pleural effusion with chest tube in place. No obvious loculations noted on this limited bedside pleural/lung POCUS.      XR Chest Port 1 View    Result Date: 8/17/2024  EXAM: XR CHEST PORT 1 VIEW LOCATION: [facility]  HOSPITAL DATE: 8/17/2024 INDICATION: Recheck chest tube and pleural effusions COMPARISON: CT chest without contrast 08/16/2024, chest radiograph 08/15/2024     IMPRESSION: Stable position of the right basilar pigtail chest tube. Stable complete opacification of the right hemithorax with rib crowding compatible with large pleural effusion and associated collapse of the right lung. Right upper extremity PICC line  with tip overlying the mid aspect of the SVC. Cardiomediastinal silhouette is partially obscured by the above process however appears grossly stable. Aortic valve replacement. Stable position of the left chest pacemaker. Trace left pleural effusion. Streaky opacities at the left lung base favoring atelectasis. No pneumothorax. Unchanged osseous structures.

## 2024-08-31 NOTE — PLAN OF CARE
[id number]     Problem: Adult Inpatient Plan of Care  Goal: Plan of Care Review  Description: The Plan of Care Review/Shift note should be completed every shift.  The Outcome Evaluation is a brief statement about your assessment that the patient is improving, declining, or no change.  This information will be displayed automatically on your shift  note.  Outcome: Progressing     Problem: Comorbidity Management  Goal: Blood Pressure in Desired Range  Outcome: Progressing     Problem: Comorbidity Management  Goal: Blood Glucose Levels Within Targeted Range  Outcome: Progressing     Problem: Respiratory Compromise (Pneumothorax)  Goal: Optimal Oxygenation and Ventilation  Outcome: Progressing    Patient is alert and orientated. Reported moderate pain to RUQ of abdomen, administered PRN tylenol with desired effect. Hypotensive at beginning of shift with BP 82/46 MAP 58. Notified house officer, ordered CBC and 25 g of albumen. After approximately 1 hour of administration rechecked BP = 105/57. CBC results were stable. Frequent repositioning. Remains on HiFlo oxygen at 40 L at 48% with saturation in the upper 80's to low 90's.     Sarahy Dumont RN

## 2024-08-31 NOTE — SIGNIFICANT EVENT
Significant Event Note    Time of event: 8:28 PM August 30, 2024    Description of event:  Paged by the patient's nurse for concerns of asymptomatic hypotension with blood pressure at 82/46. MAP of 58. The patient is here for empyema and acute hypoxemic respiratory failure related to pulmonary edema from holding lasix. She also has a history of HFpEF. On review of labs, the patient does appear to be anemic as well with a Hgb of 7.9.     Plan:  Given patient is here for concerns of respiratory failure in the setting of pulmonary edema/fluid overload, it would be wise to avoid IVF so as not to volume overload the patient.   - Give albumin 5% injection of 25 g once   - Check CBC   - Continue to monitor vitals     Discussed with: on-call team, Dr. Vickey Durant, DO

## 2024-08-31 NOTE — PLAN OF CARE
Problem: Gas Exchange Impaired  Goal: Optimal Gas Exchange  Outcome: Progressing  Intervention: Optimize Oxygenation and Ventilation  Recent Flowsheet Documentation  Taken 8/31/2024 0313 by Sanam Angeles, RN  Head of Bed (HOB) Positioning: HOB at 30 degrees     Problem: Risk for Delirium  Goal: Improved Sleep  Outcome: Progressing     Problem: Respiratory Compromise (Pneumothorax)  Goal: Optimal Oxygenation and Ventilation  Outcome: Progressing  Intervention: Manage Pneumothorax Effects  Recent Flowsheet Documentation  Taken 8/31/2024 0100 by Sanam Angeles RN  Administration (IS): unable to perform     Problem: Fluid Volume Excess  Goal: Fluid Balance  Outcome: Progressing   Goal Outcome Evaluation:  Pt continues to require high flow oxygen to maintain sats as ordered.  Pt did desat at times during the night when she was lying in bed.  Sats did dip into the mid 80's with activity or repositioning. FiO2 adjusted throughout the night to keeps sats 88-92% as ordered.   RT in room overnight when sats were dropping.  Pt sat at the edge of the bed multiple times as she stated it made breathing a little easier.  Lasix given this shift as BP and MAP were within parameters.  Tylenol given x1 for chest tube site pain.

## 2024-08-31 NOTE — PLAN OF CARE
"  Problem: Adult Inpatient Plan of Care  Goal: Plan of Care Review  Description: The Plan of Care Review/Shift note should be completed every shift.  The Outcome Evaluation is a brief statement about your assessment that the patient is improving, declining, or no change.  This information will be displayed automatically on your shift  note.  Outcome: Progressing  Goal: Patient-Specific Goal (Individualized)  Description: You can add care plan individualizations to a care plan. Examples of Individualization might be:  \"Parent requests to be called daily at 9am for status\", \"I have a hard time hearing out of my right ear\", or \"Do not touch me to wake me up as it startles  me\".  Outcome: Progressing  Goal: Absence of Hospital-Acquired Illness or Injury  Outcome: Progressing  Intervention: Identify and Manage Fall Risk  Recent Flowsheet Documentation  Taken 8/31/2024 0805 by Mala Caballero RN  Safety Promotion/Fall Prevention:   activity supervised   assistive device/personal items within reach  Intervention: Prevent Infection  Recent Flowsheet Documentation  Taken 8/31/2024 0805 by Mala Caballero RN  Infection Prevention: hand hygiene promoted  Goal: Optimal Comfort and Wellbeing  Outcome: Progressing  Intervention: Monitor Pain and Promote Comfort  Recent Flowsheet Documentation  Taken 8/31/2024 0936 by Mala Caballero RN  Pain Management Interventions: medication (see MAR)  Intervention: Provide Person-Centered Care  Recent Flowsheet Documentation  Taken 8/31/2024 0805 by Mala Caballero RN  Trust Relationship/Rapport:   care explained   choices provided  Goal: Readiness for Transition of Care  Outcome: Progressing     Problem: Gas Exchange Impaired  Goal: Optimal Gas Exchange  Outcome: Progressing     Problem: Dysrhythmia  Goal: Normalized Cardiac Rhythm  Outcome: Progressing     Problem: Comorbidity Management  Goal: Maintenance of COPD Symptom Control  Outcome: Progressing  Goal: Blood Glucose Levels Within Targeted " Range  Outcome: Progressing  Goal: Blood Pressure in Desired Range  Outcome: Progressing     Problem: Risk for Delirium  Goal: Optimal Coping  Outcome: Progressing  Goal: Improved Behavioral Control  Outcome: Progressing  Intervention: Minimize Safety Risk  Recent Flowsheet Documentation  Taken 8/31/2024 0805 by Mala Caballero RN  Communication Enhancement Strategies:   call light answered in person   communication board used  Trust Relationship/Rapport:   care explained   choices provided  Goal: Improved Attention and Thought Clarity  Outcome: Progressing  Intervention: Maximize Cognitive Function  Recent Flowsheet Documentation  Taken 8/31/2024 0805 by Mala Caballero RN  Reorientation Measures: clock in view  Goal: Improved Sleep  Outcome: Progressing     Problem: Risk for Delirium  Goal: Optimal Coping  Outcome: Progressing  Goal: Improved Behavioral Control  Outcome: Progressing  Intervention: Minimize Safety Risk  Recent Flowsheet Documentation  Taken 8/31/2024 0805 by Mala Caballero RN  Communication Enhancement Strategies:   call light answered in person   communication board used  Trust Relationship/Rapport:   care explained   choices provided  Goal: Improved Attention and Thought Clarity  Outcome: Progressing  Intervention: Maximize Cognitive Function  Recent Flowsheet Documentation  Taken 8/31/2024 0805 by Mala Caballero RN  Reorientation Measures: clock in view  Goal: Improved Sleep  Outcome: Progressing     Problem: Respiratory Compromise (Pneumothorax)  Goal: Optimal Oxygenation and Ventilation  Outcome: Progressing  Intervention: Manage Pneumothorax Effects  Recent Flowsheet Documentation  Taken 8/31/2024 0805 by Mala Caballero RN  Administration (IS): instruction provided, follow-up  Level Incentive Spirometer (mL): 250  Number of Repetitions (IS): 10  Patient Tolerance (IS): poor   Goal Outcome Evaluation:    Pt alert and orinted. PT reports her breathing os comfortbale with intermittent periods of SOB.  Remains on HFNC 40L 50%fio2, sats 96. LS course, diminished. IS use. Occationally desatting mid 80s throughout shift, cough deep breaths, IS use. Sats 88-92% . Pain to right side. Tylenol given x2.   VBG critical labs at start of shift, MD aware. Stat CT done. Cardiology seen. ECHO completed. Pulm seen. Pt sent to US for Left thorentesis. Per SWAT  fluid removed.   WOC consult obtained for bruised, Non blanching coccyx area. Pt repositioned q2h, barrier cream placed. Pt sat edge of bed x1. Tolerated fairly well.

## 2024-08-31 NOTE — PROGRESS NOTES
INFECTIOUS DISEASE FOLLOW UP NOTE      ASSESSMENT:  History of COPD.  History of aortic stenosis status post TAVR.  Status post pacemaker placement  Recent history of COVID-19 infection complicated with secondary bacterial pneumonia in middle July 2024.  Sputum cultures on 7/13/2024 with E. coli.  Right upper lobe collapse status post thoracentesis on 7/15/2024.  Readmitted with recurrent right-sided pleural effusion status post bronchoscopy on 8/11/2024.  Cultures with E. coli and Candida albicans.  Pleural effusion cultures positive with Streptococcus pneumonia. Chest tube placed 8/11/24. Now s/p VATS, unable to inflate lung. Bronch deferred due to O2 status. Improving, CXR looking better.      Yeast from BAL typically colonizer.     Worsening oxygenation 8/29-. Afebrile, normal WBC last check. Repeat CXR suggests interstitial edema. CT chest with increased size in pleural effusion, noted sign of pulmonary edema.      PLAN:  Ceftriaxone should be adequate for pneumococcus and E coli. Probably do not need to cover anaerobes (no teeth).    Expect about 4 weeks IV antibiotics from chest tube placement, this would be until 9/8/24, will plan for 9/10, will need Chest X ray around that time and consideration of switch to PO augmentin until chest CT with pulmonary.     Diuresis, thoracentesis planned. Will follow.     Ravindra Bacon MD  [facility]  Contact via [employer] or [facility] [phone]  ______________________________________________________________________    SUBJECTIVE / INTERVAL HISTORY: still feels unwell. Pain abdomen she attributes to prior chest tube. Temps ok. Cough in morning. Reviewed CT results.     ROS: deconditioned. All other systems negative except as listed above.        OBJECTIVE:  /59 (BP Location: Left arm, Patient Position: Semi-Man's, Cuff Size: Adult Regular)   Pulse 86   Temp 98  F (36.7  C) (Oral)   Resp 20   Wt 70.7 kg (155 lb 13.8 oz)    LMP  (LMP Unknown)   SpO2 94%   BMI 25.94 kg/m         Vital Signs  Temp: 97.4  F (36.3  C)  Temp src: Oral  Resp: 20  Pulse: 82  Pulse Rate Source: Monitor  BP: 102/59  BP Location: Left arm    Temp (24hrs), Av.3  F (36.8  C), Min:97.4  F (36.3  C), Max:99.1  F (37.3  C)      GEN: No acute distress.  HFNC, although prongs were just outside her nares. Sat 93%.  RESPIRATORY:  Chest tube on right removed. normal respiratory effort. Pretty clear anterior.  CARDIOVASCULAR:  regular  ABDOMEN:  mildly tender RUQ  EXTREMITIES: No edema.  SKIN/HAIR/NAILS:  No rashes  IV: PICC        Antibiotics:  ceftriaxone  On either zosyn or ceftri since 8/10  Off fluconazole    Pertinent labs:    Recent Labs   Lab 24  0545 24  2051 24  1044   WBC 4.3 3.9* 4.5   HGB 8.0* 7.7* 7.9*   HCT 28.8* 27.5* 27.9*   * 105* 111*        Recent Labs   Lab 24  0545 24  0452 24  0700    142 144   CO2 46* 42* 42*   BUN 8.8 9.1 12.1          Lab Results   Component Value Date    ALT 31 2024    AST 20 2024    ALKPHOS 105 2024         MICROBIOLOGY DATA:  Susceptibility data from last 90 days.  Collected Specimen Info Organism Ampicillin Ampicillin/Sulbactam Azithromycin Cefepime Ceftazidime Ceftriaxone Ceftriaxone (meningitis) Ceftriaxone (non-meningitis) Ciprofloxacin Clindamycin Gentamicin Levofloxacin Meropenem   24 Bronchial Alveolar Lavage from Lung, Upper Lobe, Right Escherichia coli                  Normal anna marie                24 Bronchial Alveolar Lavage from Lung, Right Yeast                24 Bronchial Alveolar Lavage from Lung, Lower Lobe, Right Escherichia coli                  Normal anna marie                24 Bronchial Alveolar Lavage from Lung, Right Candida albicans                08/10/24 Sputum from Expectorate Streptococcus pneumoniae    S     S  S   S   S      Escherichia coli  S  S   S  S  S    S   S  S  S   08/10/24 Pleural fluid from Pleural  Cavity, Right Streptococcus pneumoniae    S     S  S   S   S    07/13/24 Sputum from Expectorate Escherichia coli  S  S   S  S  S    S   S  S  S     Normal anna marie                  Collected Specimen Info Organism Penicillin (meningitis) Penicillin (non-meningitis) Penicillin(oral) Piperacillin/Tazobactam Tobramycin Trimethoprim/Sulfamethoxazole  Vancomycin   08/11/24 Bronchial Alveolar Lavage from Lung, Upper Lobe, Right Escherichia coli            Normal anna marie          08/11/24 Bronchial Alveolar Lavage from Lung, Right Yeast          08/11/24 Bronchial Alveolar Lavage from Lung, Lower Lobe, Right Escherichia coli            Normal anna marie          08/11/24 Bronchial Alveolar Lavage from Lung, Right Candida albicans          08/10/24 Sputum from Expectorate Streptococcus pneumoniae  S  S  S     S     Escherichia coli     S  S  S    08/10/24 Pleural fluid from Pleural Cavity, Right Streptococcus pneumoniae  S  S  S     S   07/13/24 Sputum from Expectorate Escherichia coli     S  S  S      Normal anna marie            8/30 sputum -- low PMNs, low s epi cells    RADIOLOGY:  CT Chest w/o Contrast    Result Date: 8/31/2024  EXAM: CT CHEST WITHOUT CONTRAST LOCATION: [facility] DATE: 08/31/2024 INDICATION: Right lower lobe infiltrate status post VATS, now with increasing oxygen requirements. COMPARISON: CT chest 08/20/2024. TECHNIQUE: CT chest without IV contrast. Multiplanar reformats were obtained. Dose reduction techniques were used. CONTRAST: None. FINDINGS: LUNGS AND PLEURA: Moderate left pleural fluid is slightly larger. No left pneumothorax. Interval removal of right chest tube. There is persistent complex small volume right pleural effusion with small pneumothorax gas at the right chest base. The pneumothorax gas is smaller in size in the interval. Some apparent debris at the pleural fluid posteriorly is again suggested on series 2 image 47. Moderate right and left base atelectasis. Small  aspirated material within the right main bronchus image 58, though the volume of aspirated material appears decreased in the interval. Emphysematous changes. Mildly increased hazy opacities at the left upper lobe. Mild smooth interstitial thickening bilaterally. MEDIASTINUM/AXILLAE: Scattered thoracic aortic calcifications without acute abnormality. Stable small mediastinal lymph nodes. No new acute mediastinal abnormality. Left chest pacer. Right PICC line tip at the mid SVC. TAVR. CORONARY ARTERY CALCIFICATION: Severe. UPPER ABDOMEN: Stable hypodense enlargement of the bilateral adrenals. In particular, the left adrenal is 3.4 cm. Low-density suggests adenomas. No acute upper abdominal abnormality. MUSCULOSKELETAL: Diffuse degenerative changes of the spine. No focal bony abnormality.     IMPRESSION: 1.  Removal of right chest tube. Persistent complex small stable volume right-sided pleural fluid. Suggestion of some debris within the fluid at the right chest base. Bubbles of pneumothorax gas on the right are smaller in size. 2.  Moderate left pleural fluid is slightly larger. 3.  Increased hazy opacities at the left upper lobe. Bilateral smooth interstitial thickening. These findings suggest a degree of pulmonary edema. 4.  Aspirated material leading to the right lower lobe again seen, but the aspirated material appears smaller since 08/20/2024. Moderate bibasilar atelectasis. 5.  A few additional stable findings.     XR Chest Port 1 View    Result Date: 8/30/2024  EXAM: XR CHEST PORT 1 VIEW LOCATION: [facility] DATE: 8/30/2024 INDICATION: Worsening hypoxia. COMPARISON: 6/29/2024.     IMPRESSION: Prior increased left basilar opacification has mildly improved. Otherwise, no significant change of bilateral mid to lower lung predominant opacities / atelectasis and small pleural effusions. Interstitial prominence has increased, suggesting pulmonary edema. Stable cardiomediastinal silhouette.  Pacemaker. TAVR. Right PICC tip over the distal SVC. No pneumothorax.     XR Chest Port 1 View    Result Date: 8/29/2024  EXAM: XR CHEST PORT 1 VIEW LOCATION: [facility] DATE: 8/29/2024 INDICATION: Hypoxia, cough, eval for intervel change COMPARISON: 8/27/2024 and multiple prior studies, CT chest 8/20/2024     IMPRESSION: Left costophrenic angle is clipped. Prior TAVR and dual lead left subclavian venous pacer. Right upper extremity PICC line catheter is in good position. Increased opacification in the left lower lobe of concern for worsening pneumonia. Volume loss on the right with small effusion and moderate right infrahilar opacities are unchanged. No visible pneumothorax.    XR Chest Port 1 View    Result Date: 8/27/2024  EXAM: XR CHEST PORT 1 VIEW LOCATION: [facility] DATE: 8/27/2024 INDICATION: Recheck Chest tube, s p VATS 8 19, right lung re expansion with pulmonary toileting COMPARISON: 8/26/2024     IMPRESSION: Very minimal increase in aeration in the right lung. There still remains significant atelectasis right lung base with pleural fluid or thickening. Minimal pleural fluid or thickening left lung base with atelectasis. Cardiac enlargement. Pulmonary vascular congestion. Aortic calcification. AVR. Right PICC with tip in the low SVC. Left-sided cardiac pacemaker.    XR Chest Port 1 View    Result Date: 8/26/2024  EXAM: XR CHEST PORT 1 VIEW LOCATION: [facility] DATE: 8/26/2024 INDICATION: Recheck Chest tube, s p VATS 8 19, right lung re expansion with pulmonary toileting COMPARISON: 8/25/2024.     IMPRESSION: The heart is unchanged in appearance. The right PICC and dual-lead pacing device as well as the cardiac valvular prosthesis are all unchanged. The lungs are otherwise unchanged appearance    XR Chest Port 1 View    Result Date: 8/25/2024  EXAM: XR CHEST PORT 1 VIEW LOCATION: [facility] DATE: 8/25/2024  INDICATION: s p chest tube removal. COMPARISON: 8/5/2024 at 0616 hours     IMPRESSION: Prior seen right chest tube has been removed with no evidence for a pneumothorax. Otherwise the chest is stable.    XR Chest Port 1 View    Result Date: 8/25/2024  EXAM: XR CHEST PORTABLE 1 VIEW LOCATION: [facility] DATE: 08/25/2024 INDICATION: Recheck chest tube, status post VATS 08/19, right lung re-expansion with pulmonary toileting. COMPARISON: Yesterday.     IMPRESSION: Right PICC line tip in the SVC. Right chest tube with tip projecting over the right upper lung. No pneumothorax. Endovascular aortic valve replacement. Left infraclavicular pacemaker with lead tips projecting over the right atrium and right ventricle. Heart size upper limits of normal. Mild to moderate pulmonary vascular congestion with bilateral pleural effusions and associated compressive atelectasis. Degenerative changes bilateral shoulders, greater on the right.     XR Chest Port 1 View    Result Date: 8/24/2024  EXAM: XR CHEST PORT 1 VIEW LOCATION: [facility] DATE: 8/24/2024 INDICATION: Recheck Chest tube, s p VATS 8 19, right lung re expansion with pulmonary toileting COMPARISON: 8/23/2024.     IMPRESSION: Right apical chest tube remains in position. Right PICC tip in the mid SVC. Post endovascular repair of the aortic valve. Left subclavian pacemaker unchanged. Improved aeration of the right lung. No pneumothorax visible. Small bilateral pleural effusions and atelectasis/infiltrate of both lower lungs. Interstitial prominence consistent with mild edema. Stable cardiomegaly.    XR Chest Port 1 View    Result Date: 8/23/2024  EXAM: XR CHEST PORT 1 VIEW LOCATION: [facility] DATE: 8/23/2024 INDICATION: Recheck chest tube and right lung s p VATS and placement of water seal done on 8 22 at 1220 COMPARISON: Yesterday     IMPRESSION: Postthoracotomy changes on the right with large bore  chest tube. Right upper extremity PICC line catheter in good position. Dual-lead left subclavian venous pacer. TAVR.  Shallower inspiration., Likely little change in the small, loculated hydropneumothorax in the right. Small right apical cap appears unchanged. Stable left pleural effusion with infiltrate or atelectasis in the left base.     XR Chest Port 1 View    Result Date: 8/22/2024  EXAM: XR CHEST PORT 1 VIEW LOCATION: [facility] DATE: 8/22/2024 INDICATION: Recheck chest tube and pleural effusions. Postop thoracotomy and empyema drainage. COMPARISON: CT chest and chest x-ray 8/20/2024 and older studies     IMPRESSION: Postthoracotomy changes on the right with large bore chest tube. Right upper extremity PICC line catheter in good position. Dual-lead left subclavian venous pacer. TAVR. Shallower inspiration., Likely little change in the small, loculated hydropneumothorax in the right. Small right apical cap appears slightly decreased from before. Increased atelectasis in the left base.    CT Chest w/o Contrast    Result Date: 8/20/2024  EXAM: CT CHEST W/O CONTRAST LOCATION: [facility] DATE: 8/20/2024 INDICATION: ?LLL mass or occlusion, unable to re expand lung. COMPARISON: 8/16/2024 TECHNIQUE: CT chest without IV contrast. Multiplanar reformats were obtained. Dose reduction techniques were used. Lack of intravenous contrast significantly limits exam. CONTRAST: None. FINDINGS: LUNGS AND PLEURA: Interval placement of large bore right-sided chest tube with decrease in right pleural effusion. There is a moderate loculated appearing residual. Small anterior pneumothorax now noted. There is improved aeration of the right upper lobe, with persistent right middle lobe and lower lobe collapse and mediastinal shift. Centrilobular emphysematous change with an upper lobe predominance. Increasing, now moderate left pleural effusion with compressive basilar atelectasis.  Moderate retained secretions in the central airways. Significant retained secretions in right lower lobe bronchi. MEDIASTINUM/AXILLAE: Right-sided PICC line terminates in the distal SVC. Cardiac pacemaker. TAVR. CORONARY ARTERY CALCIFICATION: Severe. UPPER ABDOMEN: Right adrenal nodular hyperplasia. Significant enlargement of the left adrenal gland, potentially containing multiple low-attenuation nodules measuring up to 2 cm in size. Adenomatous hyperplasia favored. MUSCULOSKELETAL: No acute bony abnormalities.     IMPRESSION: 1.  Decreased right pleural effusion status post chest tube placement, with moderate, likely loculated/complicated residual, as well as small associated right pneumothorax now evident. 2.  Reexpansion of the right upper lobe with persistent collapse of right middle and lower lobe segments. Significant retained secretions, greatest in the right lower lobe. 3.  Increasing left pleural effusion. 4.  Additional findings as above.     XR Chest Port 1 View    Result Date: 8/20/2024  EXAM: XR CHEST PORT 1 VIEW LOCATION: [facility] DATE: 8/20/2024 INDICATION: Recheck chest tube and pleural effusions. Postop thoracotomy and empyema drainage. COMPARISON: 8/19/2024 and older studies, CT chest 8/16/2024 and older studies     IMPRESSION: Postthoracotomy changes with large bore right-sided chest tube. Removal of the right basilar pigtail chest tube. Dual-lead left subclavian venous pacer, TAVR and right upper extremity PICC line catheter remain in good position. There has been only partial reexpansion of the right upper lobe and apparently non of the right lower lobe. Likely small pneumothorax outlining collapsed lung in the right lateral costophrenic angle. Small right apical pleural cap persists. Volume loss with marked shift of the mediastinum into the right chest has only slightly decreased. Minimal atelectasis in the left base behind the heart with trace effusion again noted.  No signs of pneumonia or failure.    XR Chest Port 1 View    Result Date: 8/19/2024  EXAM: XR CHEST PORT 1 VIEW LOCATION: [facility] DATE: 8/19/2024 INDICATION: Recheck chest tube and pleural effusions COMPARISON: August 18, 2024     IMPRESSION: Stable pacemaker, TAVR, right chest tube, right PICC. Minimal change in near complete opacification of the right hemithorax with volume loss.    XR Chest Port 1 View    Result Date: 8/18/2024  EXAM: XR CHEST PORT 1 VIEW LOCATION: [facility] DATE: 8/18/2024 INDICATION: Recheck chest tube and pleural effusions COMPARISON: 8/17/2024     IMPRESSION: No change since yesterday. Small caliber chest tube projected at the right lung base unchanged. Complete opacification of the right hemithorax with volume loss unchanged. Right PICC line tip in low SVC. Transvenous pacemaker. Hyperinflation left lung.    POC US Chest B-Scan    Limited Bedside Lung Ultrasound, performed and interpreted by me. Indication: empyema and dyspnea With the patient positioned supine, right posterior and lateral lung fields were examined for evidence of thoracic free fluid, pulmonary consolidation, and pulmonary edema. Findings: moderate right pleural effusion noted, mostly free flowing. Some debris noted (plankton sign) Chest tube visualized in the pleural effusion. Right lung atelectasis noted. No obvious loculations although scanning was limited due to presence of dressing. IMPRESSION: moderate to large right pleural effusion with chest tube in place. No obvious loculations noted on this limited bedside pleural/lung POCUS.      XR Chest Port 1 View    Result Date: 8/17/2024  EXAM: XR CHEST PORT 1 VIEW LOCATION: [facility] DATE: 8/17/2024 INDICATION: Recheck chest tube and pleural effusions COMPARISON: CT chest without contrast 08/16/2024, chest radiograph 08/15/2024     IMPRESSION: Stable position of the right basilar pigtail chest  tube. Stable complete opacification of the right hemithorax with rib crowding compatible with large pleural effusion and associated collapse of the right lung. Right upper extremity PICC line  with tip overlying the mid aspect of the SVC. Cardiomediastinal silhouette is partially obscured by the above process however appears grossly stable. Aortic valve replacement. Stable position of the left chest pacemaker. Trace left pleural effusion. Streaky opacities at the left lung base favoring atelectasis. No pneumothorax. Unchanged osseous structures.    CT Chest w/o Contrast    Result Date: 8/16/2024  EXAM: CT CHEST W/O CONTRAST LOCATION: [facility] DATE: 8/16/2024 INDICATION: Recheck chest tube placement, clogged, etc. and recheck effusion given patient increase O2 and tachycardia COMPARISON: 8/13/2024 TECHNIQUE: CT chest without IV contrast. Multiplanar reformats were obtained. Dose reduction techniques were used. CONTRAST: None. FINDINGS: LUNGS AND PLEURA: Large right pleural effusion increased from previous. Complete collapse of the right lung. The right mainstem, upper and lower lobe bronchi are essentially completely occluded increased from previous. Single right chest tube. Small left pleural effusion with passive atelectasis left lung base slightly increased from previous. MEDIASTINUM/AXILLAE: Transvenous pacemaker. Aortic valve prosthesis. Right PICC line tip in SVC. CORONARY ARTERY CALCIFICATION: Severe. UPPER ABDOMEN: No significant finding. MUSCULOSKELETAL: Unremarkable.     IMPRESSION: 1.  Large right pleural effusion increased from previous despite the presence of the chest tube. 2.  Complete collapse of the right lung with complete opacification of the right upper lobe, right lower lobe and right mainstem bronchus increased from previous. 3.  Small left pleural effusion increased from previous.     XR Chest 1 View    Result Date: 8/15/2024  EXAM: XR CHEST 1 VIEW LOCATION: [facility] DATE: 8/15/2024 INDICATION: Treated for empyema, s p chest tube , follow up pleural effusion COMPARISON: CT chest 8/13/2024, chest radiograph 8/11/2024     IMPRESSION: Unchanged position of the pigtail chest tube in the right lung base with similar complete opacification of the right hemithorax. Trace left effusion with dependent atelectasis. Background emphysema. Cardiac silhouette and mediastinal contours  are mostly obscured. Aortic valve replacement. Left chest cardiac generator and leads are unchanged. Right upper extremity PICC tip terminates in the mid SVC.    XR Video Swallow with SLP or OT    Result Date: 8/14/2024  EXAM: XR VIDEO SWALLOW WITH SLP OR OT LOCATION: [facility] DATE: 8/14/2024 INDICATION: Difficulty swallowing. COMPARISON: None. TECHNIQUE: Routine swallow study with speech pathology using multiple barium thicknesses. RADIATION DOSE: Dose Area Product 23.86 microGy-m2 FINDINGS: Swallow study with Speech Pathology using multiple barium thicknesses. Penetration with thin liquids, which slightly improved with chin tuck maneuver and decreased swallow volumes. No definite aspiration visualized with trialed consistencies.     IMPRESSION: Penetration with thin liquids, however no aspiration visualized. Please see speech pathology report for further details and recommendations.    CT Chest w/o Contrast    Result Date: 8/14/2024  EXAM: CT CHEST W/O CONTRAST LOCATION: [facility] DATE: 8/13/2024 INDICATION: empyema s p chest tube, ?evacuated COMPARISON: 8/10/2024 TECHNIQUE: CT chest without IV contrast. Multiplanar reformats were obtained. Dose reduction techniques were used. CONTRAST: None. FINDINGS: LUNGS AND PLEURA: Right basilar pigtail pleural drain in place. Large right pleural effusion with complete atelectasis of the right lung. A few foci of gas in the pleural space. Small left pleural effusion. Emphysema. Mild frothy  secretions in trachea. Debris occludes the central right lung airways. MEDIASTINUM/AXILLAE: Stable left subclavian approach pacemaker and TAVR. Right PICC with tip near the cavoatrial junction. Rightward mediastinal shift. No lymphadenopathy. CORONARY ARTERY CALCIFICATION: Moderate. UPPER ABDOMEN: Unchanged thickening of the left adrenal gland. MUSCULOSKELETAL: Unremarkable     IMPRESSION: 1.  Large right pleural effusion with right lung collapse.     US Abdomen Limited    Result Date: 8/12/2024  EXAM: US ABDOMEN LIMITED LOCATION: [facility] DATE: 8/12/2024 INDICATION: Abnormal LFTs. COMPARISON: CT 8/10/2024 TECHNIQUE: Limited abdominal ultrasound. FINDINGS: Study is limited by a chest tube and a compression dressing on the right side. Within limitations, findings are as follows. GALLBLADDER: Limited evaluation is within normal limits. No gallstones, wall thickening, or pericholecystic fluid. Negative sonographic Ospina's sign. BILE DUCTS: No biliary dilatation. The common duct measures 7 mm. LIVER: Normal parenchyma with smooth contour. No focal mass. RIGHT KIDNEY: Not seen due to bowel gas. PANCREAS: The pancreas is largely obscured by overlying gas. No ascites.     IMPRESSION: 1.  Normal limited abdominal ultrasound within limitations detailed above.     Cardiac Device Check - Remote    Result Date: 8/12/2024  Encounter Type: alert remote pacemaker transmission for AT/AF >/=6hrs for 2 days. Device: [employer]. Pacing % /Programmed: AP 43%,  <1% at AAIR<=>DDDR 60/130 ppm. Lead(s): stable. Battery longevity: 14yrs, 3mo estimated. Presenting: Atrial flutter with ventricular sensing  bpm. Atrial high rates: since 4/29/24; 24 mode switch episodes, AT/AF appears to be in progress since 7/21/24 2:45PM, burden <1%, v-rates >/=120bpm ~95%. 19 fast A&V episodes, available EGMs appear to be RVR during AF. Anticoagulant: Eliquis. Ventricular High rates: since 4/29/24; None detected.  Comments: Normal device function. Plan: Routed to device RN for review. MICHELLE Padgett, Device Specialist ADD: Transmission reviewed, see EPIC for details. Zofia Hodges RN  Device follow up for the entirety of having the Pacemaker, based on best practices determined by [employer] and in Compliance with Medicare guidelines. Continue remote device monitoring per patient plan. I have reviewed and interpreted the device interrogation, settings, programming, and encounter summary. The device is functioning within normal device parameters. I agree with the current findings, assessment and plan.    XR Chest 1 View    Result Date: 8/11/2024  EXAM: XR CHEST 1 VIEW LOCATION: [facility] DATE: 08/11/2024 INDICATION: New chest tube placement. COMPARISON: 08/10/2024 CXR and CT chest.     IMPRESSION: Interval placement right basilar pleural drainage catheter. Previously seen large right pleural effusion has been nearly completely evacuated and the mid and lower right lung have partially reexpanded. No pneumothorax. Left lung clear. Mild cardiac enlargement. Transcatheter aortic valve replacement. Pacer anterior left chest wall with lead tips in the RA and RV. Right PICC tip RA/SVC junction.     XR Chest Port 1 View    Result Date: 8/10/2024  EXAM: XR CHEST PORT 1 VIEW LOCATION: [facility] DATE: 8/10/2024 INDICATION: RN placed PICC   verify tip placement COMPARISON: 8/10/2024     IMPRESSION: New right PICC with tip near the cavoatrial junction. Otherwise, no significant interval change.    XR Chest Port 1 View    Result Date: 8/10/2024  EXAM: XR CHEST PORTABLE 1 VIEW LOCATION: [facility] DATE: 08/10/2024 INDICATION: Status post right pigtail chest tube. COMPARISON: 08/10/2024 at 1010 hours.     IMPRESSION: No change in dual-lead cardiac pacer or TAVR. Right-sided chest tube has been placed since comparison study with decrease in the previously seen  large right pleural effusion. A moderate to large pleural effusion remains. There is likely overlying compressive atelectasis of the inferior portion of the right lung with concurrent infectious process not excluded. There is some indistinctness of the pulmonary interstitium involving the left lower lobe which is new from prior study and may reflect airway inflammation or edema.     CT Chest w Contrast    Result Date: 8/10/2024  EXAM: CT CHEST W CONTRAST LOCATION: [facility] DATE: 8/10/2024 INDICATION: SOB, large right-sided pleural effusion status post right pigtail COMPARISON: None. TECHNIQUE: CT chest with IV contrast. Multiplanar reformats were obtained. Dose reduction techniques were used. CONTRAST: 90 mL Isovue-370 FINDINGS: LUNGS AND PLEURA: Large right effusion. Pigtail catheter is at the anterior right apex without significant fluid surrounding it. Extensive compressive atelectasis throughout the right lung. Left lung clear. MEDIASTINUM/AXILLAE: No lymphadenopathy. No thoracic aneurysm. CORONARY ARTERY CALCIFICATION: Moderate. UPPER ABDOMEN: No acute findings. MUSCULOSKELETAL: No frankly destructive bony lesions.     IMPRESSION: 1.  Large right effusion and extensive compressive atelectasis versus less likely infiltrate in the right lung. 2.  The pigtail catheter tip is at the anterior apex, most of the fluid is at the base.        XR Chest Port 1 View    Result Date: 8/10/2024  EXAM: XR CHEST PORT 1 VIEW LOCATION: [facility] DATE: 8/10/2024 INDICATION: sob, hypoxic, history of prior ptx, eval for ptx, pna or edema COMPARISON: 7/17/2020     IMPRESSION: Large right pleural effusion. No pneumothorax. Left subclavian approach pacing device. Prosthetic aortic valve. Cardiac silhouette within normal limits. No acute osseous abnormality.

## 2024-08-31 NOTE — CONSULTS
Thank you, Dr. Kaiser, for asking the [facility] team to see Ms. Mary Kay Tejada to evaluate       Assessment/Recommendations   Assessment/Plan:  Dyspnea due to increase fluid in pleural space on left - noted CJP 10, no edema, difficulty with diuresis, c/w low filling pressures, edema related to high pressure PA from increase pleural fluid - recommend thoracentesis left.  Afib -rate control dil and elquis for CVA prevention  Aortic valve s/pTAVR no AI on exam and no MR on exam  S/p PPM - last interrogation 46% AP  HFpEF - thoracentesis once BP improved, then add spironolactone 12.5mg daily.  Echo check RV fxn, IVC and PA presures    Followed up by EP and cardiology here     History of Present Illness/Subjective    Ms. Mary Kay Tejada is a 74 year old female with tob use, COPD, moderate aortic stenosis s/p TAVR with mean gradien 8mmhg 4/24), afib, HFpEF, hpoxia due to pneumothorax/numina s/p chest tube EF 55-60% on echo, s/p PPM (last interrogation 7/22 AP 43% <1% ), Hgb today 8 up from 6.5),critical CO 46 ntoed    Med: eliquis 5mg bid, atorvastastin, dig , diltaizem 120mg daily, furosemide on hold,      Physical Examination Review of Systems   /58 (BP Location: Left arm)   Pulse 85   Temp 98  F (36.7  C) (Oral)   Resp 19   Wt 70.7 kg (155 lb 13.8 oz)   LMP  (LMP Unknown)   SpO2 94%   BMI 25.94 kg/m    Body mass index is 25.94 kg/m .  Wt Readings from Last 3 Encounters:   08/28/24 70.7 kg (155 lb 13.8 oz)   08/16/24 72.2 kg (159 lb 1.6 oz)   07/15/24 65.3 kg (143 lb 14.4 oz)       Intake/Output Summary (Last 24 hours) at 8/31/2024 0930  Last data filed at 8/31/2024 0509  Gross per 24 hour   Intake 480 ml   Output 1300 ml   Net -820 ml     General Appearance:   no distress, normal body habitus   ENT/Mouth: membranes moist, no oral lesions or bleeding gums.      EYES:  no scleral icterus, normal conjunctivae   Neck: no carotid bruits or thyromegaly   Chest/Lungs:   lungs are  clear to auscultation, no rales or wheezing,  sternal scar, equal chest wall expansion    Cardiovascular:   Regular. Normal first and second heart sounds with no murmurs, rubs, or gallops; the carotid, radial and posterior tibial pulses are intact, Jugular venous pressure , edema bilaterally    Abdomen:  no organomegaly, masses, bruits, or tenderness; bowel sounds are present   Extremities: no cyanosis or clubbing   Skin: no xanthelasma, warm.    Neurologic: normal  bilateral, no tremors     Psychiatric: alert and oriented x3, calm     Review of Systems - 12 points nega other than above      Medical History  Surgical History Family History Social History   Past Medical History:   Diagnosis Date    Anemia     Aortic stenosis     Aortic valve disorder     Atrial fibrillation (H)     Atrial flutter (H)     Benign neoplasm of adenomatous polyp     large intestine     Chronic constipation     Chronic heart failure with preserved ejection fraction (H) 02/29/2024    Chronic pain syndrome     Congestive heart failure (H)     COPD (chronic obstructive pulmonary disease) (H)     Oxygen at night     Dependence on supplemental oxygen     Oxygen at noc, during the day as needed    Depression     Diabetes mellitus (H)     Dry eye syndrome     Fibromyalgia     Ganglion     left wrist    GERD (gastroesophageal reflux disease)     Hyperlipidemia     Hypertension     Hypokalemia     Infective otitis externa, unspecified     Created by Conversion     Larynx edema     Lung disease     Malignant neoplasm of vulva (H)     Created by Conversion [facility] Annotation: Apr 17 2007  8:24AM - Cammy Bui:  resection per Dr. Alfonso Mane 9/06;  Replacement Utility updated for latest IMO load    Medial epicondylitis     Onychomycosis     Osteoarthritis     Peptic ulcer     Polyneuropathy     Vulvar malignant neoplasm (H)     Past Surgical History:   Procedure Laterality Date    BIOPSY BREAST Right     BIOPSY BREAST Right  01/28/2015    BIOPSY BREAST Right 01/28/2015    Procedure: RIGHT BREAST BIOPSY AFTER WIRE LOCALIZATION AT 0940;  Surgeon: Renée Soriano MD;  Location: [facility] OR;  Service:     BIOPSY OF BREAST, INCISIONAL      Description: Incisional Breast Biopsy;  Recorded: 11/13/2007;  Comments: benign    COLONOSCOPY N/A 06/14/2019    Procedure: COLONOSCOPY;  Surgeon: Eduardo Mroa MD;  Location: [facility] OR;  Service: Gastroenterology    CV CORONARY ANGIOGRAM N/A 02/08/2024    Procedure: CV CORONARY ANGIOGRAM;  Surgeon: Moises Valencia MD;  Location: [facility]    CV LEFT HEART CATH N/A 02/08/2024    Procedure: Left Heart Catheterization;  Surgeon: Moises Valencia MD;  Location: [facility]    CV RIGHT HEART CATH MEASUREMENTS RECORDED N/A 02/08/2024    Procedure: Right Heart Catheterization;  Surgeon: Moises Valencia MD;  Location: [facility]    CV TRANSCATHETER AORTIC VALVE REPLACEMENT-FEMORAL APPROACH N/A 02/20/2024    Procedure: Transcatheter Aortic Valve Replacement, possible cardiopulmonary bypass, possible surgical intervention;  Surgeon: Moises Valencia MD;  Location: [facility] CV    EP PACEMAKER DEVICE & IMPLANT- HIS LEAD DUAL N/A 3/7/2024    Procedure: Pacemaker Device & Lead Implant - HIS Lead Dual;  Surgeon: Donnell Leon MD;  Location: [facility] CV    ESOPHAGOSCOPY, GASTROSCOPY, DUODENOSCOPY (EGD), COMBINED N/A 11/06/2018    Procedure: ESOPHAGOGASTRODUODENOSCOPY;  Surgeon: Lit Fernando MD;  Location: [facility] OR;  Service:     HYSTERECTOMY      JOINT REPLACEMENT Left     TKA    OR TRANSCATHETER AORTIC VALVE REPLACEMENT, FEMORAL PERCUTANEOUS APPROACH (STANDBY) N/A 02/20/2024    Procedure: OR TRANSCATHETER AORTIC VALVE REPLACEMENT, FEMORAL PERCUTANEOUS APPROACH (STANDBY);  Surgeon: Ishmael Farias MD;  Location: [facility] CV    PICC TRIPLE LUMEN PLACEMENT  01/12/2023         PICC TRIPLE LUMEN PLACEMENT   8/10/2024    IN ABLATE HEART DYSRHYTHM FOCUS      Description: Catheter Ablation Atrial Fibrillation;  Recorded: 07/31/2012;  Comments: 7/24/12 PVI with Dr. Bansal to all 5 pulm veins and CTI fl ablation line as well.    TRANSCATHETER AORTIC-VALVE REPLACEMENT      VIDEO-ASSISTED THORACOSCOPIC SURGERY (VATS) Right 8/19/2024    Procedure: VIDEO-ASSISTED RIGHT THORACOSCOPIC SURGERY WITH RIGHT PLEURAL FLUID CYTOLOGY;  Surgeon: Renée Soriano MD;  Location: [facility] SUPRACERV ABD HYSTERECTOMY      Description: Supracervical Hysterectomy;  Proc Date: 01/01/1985;  Comments: some cervix left!; ovaries intact; done for bleeding    Family History   Problem Relation Age of Onset    Heart Failure Mother     Cancer Other         paternal HX-laryngeal     Alcoholism Sister     No Known Problems Daughter     No Known Problems Maternal Grandmother     No Known Problems Maternal Grandfather     No Known Problems Paternal Grandmother     No Known Problems Paternal Grandfather     No Known Problems Maternal Aunt     No Known Problems Paternal Aunt     Alcoholism Sister     Alcoholism Brother     Alcoholism Father     Cancer Paternal Uncle         Gastric-Alcohol    Cancer Paternal Uncle         gastric-Alcohol    Hereditary Breast and Ovarian Cancer Syndrome No family hx of     Breast Cancer No family hx of     Colon Cancer No family hx of     Endometrial Cancer No family hx of     Ovarian Cancer No family hx of     Social History     Socioeconomic History    Marital status:      Spouse name: Not on file    Number of children: Not on file    Years of education: Not on file    Highest education level: Not on file   Occupational History    Not on file   Tobacco Use    Smoking status: Some Days     Current packs/day: 0.25     Types: Cigarettes     Passive exposure: Never    Smokeless tobacco: Never    Tobacco comments:     seen by TTS inpatient on 3/31/22   Vaping Use    Vaping status: Never Used    Substance and Sexual Activity    Alcohol use: Yes     Comment: Alcoholic Drinks/day: very little    Drug use: No    Sexual activity: Not on file   Other Topics Concern    Not on file   Social History Narrative    Not on file     Social Determinants of Health     Financial Resource Strain: Low Risk  (8/21/2024)    Financial Resource Strain     Within the past 12 months, have you or your family members you live with been unable to get utilities (heat, electricity) when it was really needed?: No   Food Insecurity: Low Risk  (8/21/2024)    Food Insecurity     Within the past 12 months, did you worry that your food would run out before you got money to buy more?: No     Within the past 12 months, did the food you bought just not last and you didn t have money to get more?: No   Transportation Needs: Low Risk  (8/21/2024)    Transportation Needs     Within the past 12 months, has lack of transportation kept you from medical appointments, getting your medicines, non-medical meetings or appointments, work, or from getting things that you need?: No   Physical Activity: Not on file   Stress: Not on file   Social Connections: Not on file   Interpersonal Safety: Low Risk  (8/21/2024)    Interpersonal Safety     Do you feel physically and emotionally safe where you currently live?: Yes     Within the past 12 months, have you been hit, slapped, kicked or otherwise physically hurt by someone?: No     Within the past 12 months, have you been humiliated or emotionally abused in other ways by your partner or ex-partner?: No   Housing Stability: Low Risk  (8/21/2024)    Housing Stability     Do you have housing? : Yes     Are you worried about losing your housing?: No          Medications  Allergies   Scheduled Meds:  Current Facility-Administered Medications   Medication Dose Route Frequency Provider Last Rate Last Admin    acetylcysteine (MUCOMYST) 20 % nebulizer solution 2 mL  2 mL Nebulization 4x Daily Donte Dutta,    2  mL at 08/31/24 0709    apixaban ANTICOAGULANT (ELIQUIS) tablet 5 mg  5 mg Oral BID Lauren Willson MD   5 mg at 08/30/24 2040    artificial saliva (BIOTENE MT) solution 1 spray  1 spray Mouth/Throat 4x Daily Renée Soriano MD   1 spray at 08/30/24 0835    atorvastatin (LIPITOR) tablet 20 mg  20 mg Oral At Bedtime Renée Soriano MD   20 mg at 08/30/24 2040    cefTRIAXone (ROCEPHIN) 2 g vial to attach to  ml bag for ADULTS or NS 50 ml bag for PEDS  2 g Intravenous Q24H Ravindra Bacon MD   2 g at 08/30/24 1219    digoxin (LANOXIN) tablet 125 mcg  125 mcg Oral Daily Renée Soriano MD   125 mcg at 08/30/24 0834    diltiazem ER COATED BEADS (CARDIZEM CD/CARTIA XT) 24 hr capsule 120 mg  120 mg Oral Daily Renée Soriano MD   120 mg at 08/29/24 1203    fluticasone-vilanterol (BREO ELLIPTA) 200-25 MCG/ACT inhaler 1 puff  1 puff Inhalation Daily Renée Soriano MD   1 puff at 08/30/24 0834    [Held by provider] furosemide (LASIX) injection 20 mg  20 mg Intravenous Q8H Donte Dutta DO   20 mg at 08/31/24 0431    [Held by provider] furosemide (LASIX) tablet 20 mg  20 mg Oral Daily Donte Dutta DO        gabapentin (NEURONTIN) capsule 600 mg  600 mg Oral TID Renée Soriano MD   600 mg at 08/30/24 2040    insulin aspart (NovoLOG) injection (RAPID ACTING)  1-10 Units Subcutaneous TID AC Renée Soriano MD   1 Units at 08/30/24 1726    insulin aspart (NovoLOG) injection (RAPID ACTING)  1-7 Units Subcutaneous At Bedtime Renée Soriano MD   1 Units at 08/28/24 2133    levalbuterol (XOPENEX) neb solution 1.25 mg  1.25 mg Nebulization 4x Daily Aamir Ross RT   1.25 mg at 08/31/24 0704    And    ipratropium (ATROVENT) 0.02 % neb solution 0.5 mg  0.5 mg Nebulization 4x daily Aamir Ross RT   0.5 mg at 08/31/24 0704    Lidocaine (LIDOCARE) 4 % Patch 1 patch  1 patch Transdermal Q24h Antonia Baptiste MD   1 patch at 08/30/24 2042    metoprolol tartrate (LOPRESSOR) tablet 50 mg  50 mg Oral BID Bo,  Renée ARAMBULA MD   50 mg at 08/30/24 0834    pantoprazole (PROTONIX) EC tablet 40 mg  40 mg Oral QAM AC Renée Soriano MD   40 mg at 08/31/24 0731    polyethylene glycol (MIRALAX) Packet 17 g  17 g Oral Daily Renée Soriano MD   17 g at 08/28/24 0803    senna-docusate (SENOKOT-S/PERICOLACE) 8.6-50 MG per tablet 1 tablet  1 tablet Oral BID Renée Soriano MD   1 tablet at 08/30/24 2040    sodium chloride (NEBUSAL) 3 % neb solution 3 mL  3 mL Nebulization 4x daily Carmelita Buckley MD   3 mL at 08/31/24 0703    sodium chloride (PF) 0.9% PF flush 10 mL  10 mL Intracatheter Q8H Lauren Willson MD   10 mL at 08/31/24 0219    sodium chloride (PF) 0.9% PF flush 10-40 mL  10-40 mL Intracatheter Q7 Days Renée Soriano MD   10 mL at 08/24/24 1700    sterile talc (STERITALC) powder for sclerosing 4 g  4 g INTRAPLEURAL Once Renée Soriano MD        sucralfate (CARAFATE) tablet 1 g  1 g Oral TID AC Renée Soriano MD   1 g at 08/31/24 0731     Continuous Infusions:  Current Facility-Administered Medications   Medication Dose Route Frequency Provider Last Rate Last Admin     PRN Meds:.  Current Facility-Administered Medications   Medication Dose Route Frequency Provider Last Rate Last Admin    acetaminophen (TYLENOL) tablet 650 mg  650 mg Oral Q4H PRN Lauren Willson MD   650 mg at 08/31/24 0219    Or    acetaminophen (TYLENOL) Suppository 650 mg  650 mg Rectal Q4H PRN Lauren Willson MD        [Held by provider] albuterol (PROVENTIL HFA/VENTOLIN HFA) inhaler  2 puff Inhalation Q4H PRN Angela Kaiser MD        albuterol (PROVENTIL) neb solution 2.5 mg  2.5 mg Nebulization Q4H PRN Charlotte Paredes MD        alum & mag hydroxide-simethicone (MAALOX) suspension 30 mL  30 mL Oral Q4H PRN Lauren Willson MD        benzocaine-menthol (CHLORASEPTIC) 6-10 MG lozenge 1 lozenge  1 lozenge Buccal Q1H PRN Renée Soriano MD        bisacodyl (DULCOLAX) suppository 10 mg  10 mg Rectal Daily PRN Renée Soriano MD        calcium carbonate  (TUMS) chewable tablet 1,000 mg  1,000 mg Oral 4x Daily PRN Renée oSriano MD        glucose gel 15-30 g  15-30 g Oral Q15 Min PRN Renée Soriano MD        Or    dextrose 50 % injection 25-50 mL  25-50 mL Intravenous Q15 Min PRN Renée Soriano MD        Or    glucagon injection 1 mg  1 mg Subcutaneous Q15 Min PRN Renée Soriano MD        HYDROmorphone (DILAUDID) injection 0.2 mg  0.2 mg Intravenous Q2H PRN Renée Soriano MD   0.2 mg at 08/20/24 0746    Or    HYDROmorphone (DILAUDID) injection 0.4 mg  0.4 mg Intravenous Q2H PRN Renée Soriano MD        ipratropium - albuterol 0.5 mg/2.5 mg/3 mL (DUONEB) neb solution 3 mL  3 mL Nebulization Q4H PRN Renée Soriano MD        lidocaine (LMX4) cream   Topical Q1H PRN Renée Soriano MD        lidocaine 1 % 0.1-1 mL  0.1-1 mL Other Q1H PRN Renée Soriano MD        magnesium hydroxide (MILK OF MAGNESIA) suspension 30 mL  30 mL Oral Daily PRN Renée Soriano MD        melatonin tablet 1 mg  1 mg Oral At Bedtime PRN Renée Soriano MD        menthol-zinc oxide (CALMOSEPTINE) 0.44-20.6 % ointment OINT   Topical 4x Daily PRN Lauren Willson MD   Given at 08/29/24 2317    miconazole (MICATIN) 2 % powder   Topical TID PRN Renée Soriano MD        naloxone (NARCAN) injection 0.2 mg  0.2 mg Intravenous Q2 Min PRN Renée Soriano MD        naloxone (NARCAN) injection 0.2 mg  0.2 mg Intramuscular Q2 Min PRN Renée Soriano MD        naloxone (NARCAN) injection 0.4 mg  0.4 mg Intravenous Q2 Min PRN Renée Soriano MD        naloxone (NARCAN) injection 0.4 mg  0.4 mg Intramuscular Q2 Min PRN Renée Soriano MD        ondansetron (ZOFRAN ODT) ODT tab 4 mg  4 mg Oral Q6H PRN Renée Soriano MD   4 mg at 08/25/24 0825    Or    ondansetron (ZOFRAN) injection 4 mg  4 mg Intravenous Q6H PRN Renée Soriano MD   4 mg at 08/25/24 0253    polyethylene glycol (MIRALAX) Packet 17 g  17 g Oral Daily PRN Renée Soriano MD   17 g at 08/23/24 0903    prochlorperazine (COMPAZINE) injection  "5 mg  5 mg Intravenous Q6H PRN Renée Soriano MD        Or    prochlorperazine (COMPAZINE) tablet 5 mg  5 mg Oral Q6H PRN Renée Soriano MD        Or    prochlorperazine (COMPAZINE) suppository 12.5 mg  12.5 mg Rectal Q12H PRRenée Carmen MD        senna-docusate (SENOKOT-S/PERICOLACE) 8.6-50 MG per tablet 1 tablet  1 tablet Oral BID PRN Renée Soriano MD        Or    senna-docusate (SENOKOT-S/PERICOLACE) 8.6-50 MG per tablet 2 tablet  2 tablet Oral BID PRRenée Carmen MD        sodium chloride (PF) 0.9% PF flush 10-20 mL  10-20 mL Intracatheter q1 min prRenée Carmen MD   10 mL at 08/21/24 1228    sodium chloride (PF) 0.9% PF flush 10-40 mL  10-40 mL Intracatheter Q1H PRRenée Carmen MD   10 mL at 08/21/24 1231    sodium chloride (PF) 0.9% PF flush 3 mL  3 mL Intracatheter q1 min prRenée Carmen MD        sodium chloride (PF) 0.9% PF flush 3 mL  3 mL Intracatheter q1 min prRenée Carmen MD        traMADol (ULTRAM) tablet 50 mg  50 mg Oral Q6H PRN Renée Soriano MD   50 mg at 08/29/24 1024    Allergies   Allergen Reactions    Celebrex [Celecoxib] Rash     patient had butterfly rash - \"lupus-like\"      Latex Rash         Lab Results    Chemistry/lipid CBC Cardiac Enzymes/BNP/TSH/INR   Lab Results   Component Value Date    CHOL 167 09/20/2023    HDL 75 09/20/2023    TRIG 83 09/20/2023    BUN 8.8 08/31/2024     08/31/2024    CO2 46 (HH) 08/31/2024    Lab Results   Component Value Date    WBC 4.3 08/31/2024    HGB 8.0 (L) 08/31/2024    HCT 28.8 (L) 08/31/2024    MCV 88 08/31/2024     (L) 08/31/2024    Lab Results   Component Value Date    TROPONINI 0.07 05/24/2023     (H) 06/02/2023    TSH 2.80 08/10/2024    INR 1.27 (H) 07/13/2024              Sukumar Swanson MD  Interventional Cardiology  [facility]"

## 2024-09-01 ENCOUNTER — APPOINTMENT (OUTPATIENT)
Dept: OCCUPATIONAL THERAPY | Facility: HOSPITAL | Age: 74
DRG: 166 | End: 2024-09-01
Attending: FAMILY MEDICINE
Payer: COMMERCIAL

## 2024-09-01 LAB
ANION GAP SERPL CALCULATED.3IONS-SCNC: 2 MMOL/L (ref 7–15)
BACTERIA SPT CULT: NORMAL
BUN SERPL-MCNC: 9.1 MG/DL (ref 8–23)
CALCIUM SERPL-MCNC: 8.5 MG/DL (ref 8.8–10.4)
CHLORIDE SERPL-SCNC: 95 MMOL/L (ref 98–107)
CREAT SERPL-MCNC: 0.47 MG/DL (ref 0.51–0.95)
EGFRCR SERPLBLD CKD-EPI 2021: >90 ML/MIN/1.73M2
ERYTHROCYTE [DISTWIDTH] IN BLOOD BY AUTOMATED COUNT: 20.1 % (ref 10–15)
GLUCOSE BLDC GLUCOMTR-MCNC: 101 MG/DL (ref 70–99)
GLUCOSE BLDC GLUCOMTR-MCNC: 112 MG/DL (ref 70–99)
GLUCOSE BLDC GLUCOMTR-MCNC: 133 MG/DL (ref 70–99)
GLUCOSE BLDC GLUCOMTR-MCNC: 201 MG/DL (ref 70–99)
GLUCOSE BLDC GLUCOMTR-MCNC: 93 MG/DL (ref 70–99)
GLUCOSE SERPL-MCNC: 103 MG/DL (ref 70–99)
GRAM STAIN RESULT: NORMAL
HCO3 SERPL-SCNC: 45 MMOL/L (ref 22–29)
HCT VFR BLD AUTO: 28.3 % (ref 35–47)
HGB BLD-MCNC: 8 G/DL (ref 11.7–15.7)
MAGNESIUM SERPL-MCNC: 1.7 MG/DL (ref 1.7–2.3)
MCH RBC QN AUTO: 24.9 PG (ref 26.5–33)
MCHC RBC AUTO-ENTMCNC: 28.3 G/DL (ref 31.5–36.5)
MCV RBC AUTO: 88 FL (ref 78–100)
PLATELET # BLD AUTO: 116 10E3/UL (ref 150–450)
POTASSIUM SERPL-SCNC: 3.6 MMOL/L (ref 3.4–5.3)
RBC # BLD AUTO: 3.21 10E6/UL (ref 3.8–5.2)
SODIUM SERPL-SCNC: 142 MMOL/L (ref 135–145)
WBC # BLD AUTO: 3.7 10E3/UL (ref 4–11)

## 2024-09-01 PROCEDURE — 99231 SBSQ HOSP IP/OBS SF/LOW 25: CPT | Performed by: INTERNAL MEDICINE

## 2024-09-01 PROCEDURE — 99232 SBSQ HOSP IP/OBS MODERATE 35: CPT | Performed by: INTERNAL MEDICINE

## 2024-09-01 PROCEDURE — 250N000013 HC RX MED GY IP 250 OP 250 PS 637: Performed by: SPECIALIST

## 2024-09-01 PROCEDURE — 94799 UNLISTED PULMONARY SVC/PX: CPT

## 2024-09-01 PROCEDURE — 94640 AIRWAY INHALATION TREATMENT: CPT

## 2024-09-01 PROCEDURE — 97110 THERAPEUTIC EXERCISES: CPT | Mod: GO

## 2024-09-01 PROCEDURE — 250N000013 HC RX MED GY IP 250 OP 250 PS 637: Performed by: FAMILY MEDICINE

## 2024-09-01 PROCEDURE — 94640 AIRWAY INHALATION TREATMENT: CPT | Mod: 76

## 2024-09-01 PROCEDURE — 250N000013 HC RX MED GY IP 250 OP 250 PS 637: Performed by: STUDENT IN AN ORGANIZED HEALTH CARE EDUCATION/TRAINING PROGRAM

## 2024-09-01 PROCEDURE — 120N000001 HC R&B MED SURG/OB

## 2024-09-01 PROCEDURE — 250N000009 HC RX 250

## 2024-09-01 PROCEDURE — 250N000011 HC RX IP 250 OP 636: Performed by: INTERNAL MEDICINE

## 2024-09-01 PROCEDURE — 99233 SBSQ HOSP IP/OBS HIGH 50: CPT | Performed by: INTERNAL MEDICINE

## 2024-09-01 PROCEDURE — 999N000157 HC STATISTIC RCP TIME EA 10 MIN

## 2024-09-01 PROCEDURE — 83735 ASSAY OF MAGNESIUM: CPT | Performed by: INTERNAL MEDICINE

## 2024-09-01 PROCEDURE — 250N000009 HC RX 250: Performed by: INTERNAL MEDICINE

## 2024-09-01 PROCEDURE — 94660 CPAP INITIATION&MGMT: CPT

## 2024-09-01 PROCEDURE — 85014 HEMATOCRIT: CPT | Performed by: INTERNAL MEDICINE

## 2024-09-01 PROCEDURE — 80048 BASIC METABOLIC PNL TOTAL CA: CPT | Performed by: SPECIALIST

## 2024-09-01 RX ADMIN — LEVALBUTEROL HYDROCHLORIDE 1.25 MG: 1.25 SOLUTION RESPIRATORY (INHALATION) at 16:09

## 2024-09-01 RX ADMIN — ATORVASTATIN CALCIUM 20 MG: 10 TABLET, FILM COATED ORAL at 21:03

## 2024-09-01 RX ADMIN — APIXABAN 5 MG: 5 TABLET, FILM COATED ORAL at 09:22

## 2024-09-01 RX ADMIN — LEVALBUTEROL HYDROCHLORIDE 1.25 MG: 1.25 SOLUTION RESPIRATORY (INHALATION) at 12:41

## 2024-09-01 RX ADMIN — SUCRALFATE 1 G: 1 TABLET ORAL at 06:47

## 2024-09-01 RX ADMIN — METOPROLOL TARTRATE 50 MG: 25 TABLET, FILM COATED ORAL at 09:21

## 2024-09-01 RX ADMIN — IPRATROPIUM BROMIDE 0.5 MG: 0.5 SOLUTION RESPIRATORY (INHALATION) at 19:51

## 2024-09-01 RX ADMIN — ACETYLCYSTEINE 2 ML: 200 SOLUTION ORAL; RESPIRATORY (INHALATION) at 16:10

## 2024-09-01 RX ADMIN — PANTOPRAZOLE SODIUM 40 MG: 40 TABLET, DELAYED RELEASE ORAL at 06:47

## 2024-09-01 RX ADMIN — SODIUM CHLORIDE SOLN NEBU 3% 3 ML: 3 NEBU SOLN at 16:09

## 2024-09-01 RX ADMIN — METOPROLOL TARTRATE 50 MG: 25 TABLET, FILM COATED ORAL at 21:04

## 2024-09-01 RX ADMIN — FLUTICASONE FUROATE AND VILANTEROL TRIFENATATE 1 PUFF: 200; 25 POWDER RESPIRATORY (INHALATION) at 09:21

## 2024-09-01 RX ADMIN — SODIUM CHLORIDE SOLN NEBU 3% 3 ML: 3 NEBU SOLN at 19:51

## 2024-09-01 RX ADMIN — FUROSEMIDE 20 MG: 10 INJECTION, SOLUTION INTRAMUSCULAR; INTRAVENOUS at 04:46

## 2024-09-01 RX ADMIN — IPRATROPIUM BROMIDE 0.5 MG: 0.5 SOLUTION RESPIRATORY (INHALATION) at 12:41

## 2024-09-01 RX ADMIN — GABAPENTIN 600 MG: 300 CAPSULE ORAL at 14:23

## 2024-09-01 RX ADMIN — SUCRALFATE 1 G: 1 TABLET ORAL at 12:37

## 2024-09-01 RX ADMIN — LEVALBUTEROL HYDROCHLORIDE 1.25 MG: 1.25 SOLUTION RESPIRATORY (INHALATION) at 19:51

## 2024-09-01 RX ADMIN — LEVALBUTEROL HYDROCHLORIDE 1.25 MG: 1.25 SOLUTION RESPIRATORY (INHALATION) at 08:52

## 2024-09-01 RX ADMIN — ACETYLCYSTEINE 2 ML: 200 SOLUTION ORAL; RESPIRATORY (INHALATION) at 12:41

## 2024-09-01 RX ADMIN — IPRATROPIUM BROMIDE 0.5 MG: 0.5 SOLUTION RESPIRATORY (INHALATION) at 08:52

## 2024-09-01 RX ADMIN — ACETYLCYSTEINE 2 ML: 200 SOLUTION ORAL; RESPIRATORY (INHALATION) at 08:52

## 2024-09-01 RX ADMIN — LIDOCAINE 1 PATCH: 4 PATCH TOPICAL at 21:06

## 2024-09-01 RX ADMIN — IPRATROPIUM BROMIDE 0.5 MG: 0.5 SOLUTION RESPIRATORY (INHALATION) at 16:09

## 2024-09-01 RX ADMIN — SODIUM CHLORIDE SOLN NEBU 3% 3 ML: 3 NEBU SOLN at 12:41

## 2024-09-01 RX ADMIN — DIGOXIN 125 MCG: 125 TABLET ORAL at 09:21

## 2024-09-01 RX ADMIN — ACETYLCYSTEINE 2 ML: 200 SOLUTION ORAL; RESPIRATORY (INHALATION) at 19:51

## 2024-09-01 RX ADMIN — CEFTRIAXONE SODIUM 2 G: 2 INJECTION, POWDER, FOR SOLUTION INTRAMUSCULAR; INTRAVENOUS at 12:34

## 2024-09-01 RX ADMIN — SODIUM CHLORIDE SOLN NEBU 3% 3 ML: 3 NEBU SOLN at 08:52

## 2024-09-01 RX ADMIN — GABAPENTIN 600 MG: 300 CAPSULE ORAL at 21:05

## 2024-09-01 RX ADMIN — SUCRALFATE 1 G: 1 TABLET ORAL at 16:37

## 2024-09-01 RX ADMIN — APIXABAN 5 MG: 5 TABLET, FILM COATED ORAL at 21:05

## 2024-09-01 RX ADMIN — GABAPENTIN 600 MG: 300 CAPSULE ORAL at 09:22

## 2024-09-01 RX ADMIN — ACETAMINOPHEN 650 MG: 325 TABLET, FILM COATED ORAL at 21:11

## 2024-09-01 RX ADMIN — SENNOSIDES AND DOCUSATE SODIUM 1 TABLET: 8.6; 5 TABLET ORAL at 21:05

## 2024-09-01 ASSESSMENT — ACTIVITIES OF DAILY LIVING (ADL)
ADLS_ACUITY_SCORE: 45
ADLS_ACUITY_SCORE: 44
ADLS_ACUITY_SCORE: 44
ADLS_ACUITY_SCORE: 45

## 2024-09-01 NOTE — PROGRESS NOTES
[facility]    Medicine Progress Note - Hospitalist Service    Date of Admission:  8/17/2024    Assessment & Plan     74-year-old female with history of a flutter, COPD, ongoing tobacco abuse, previous pleural effusions and chronic pain admitted 8/17/24 with empyema and transferred from [facility] to [facility] for surgical management.     Empyema  H/O recurrent right-sided pleural effusion status post bronchoscopy on 8/11/2024 with cultures that grew E. coli and Candida albicans.  Pleural effusion cultures growing strep pneumo  s/p VATS 8/19/24. No true emphyema or rind found, no ability to expand lung  Repeat CT 08/20 showed right pleural effusion s/p chest tube placement, moderate likely loculated/complicated residual as well as small associated right pneumothorax, reexpansion of right upper lobe with persistent collapse of right middle and lower lobe segments.  Significant retained secretions greater in the right lower lobe and increasing left pleural effusion  Plan was for bronchoscopy 08/21, not done as patient was requiring upto 10L oxygen and had high risk of being intubated  Chest tube removed 08/25   Acute hypoxemic respiratory failure worse 8/29-8/31 and likely related to pulmonary edema from holding lasix  8/29: patient with complaints of increased SOB, productive cough and weakness. CXR 8/29 shows Increased opacification in the left lower lobe of concern for worsening pneumonia. Of note patient has remained afebrile and WBC remains normal on IV ceftriaxone pneumococcus and E coli . Yeast on BAL thought to be colonization  Discussed with ID 8/29 and they recommend no changes  Overnight 8/29-8/30 patient with worsening hypoxemic respiratory failure.   CXR 8/30 with evidence of worsening pulmonary edema and possibly improved LLL opacity.  BNP elevated but improved from previous  CT chest 8/31 shows moderate left pleural effusion and stable changes on right post surgery  --  s/p thoracentesis 8/31 with 0.8L of clear fluid removed and consistent with transudate  -- consulted cardiology regarding difficult diuresis  -- TTE performed 8/31 and showed preserved EF with new evidence of decreased RV systolic function  -- cardiology recommends holding diuresis for now given her pre-load dependence with acute right sided heart failure  -- pulmonary consulted to assist with further cares  -- continue current nebs and pulmonary hygiene  -- ID following and recommends no changes to current antibiotic plan since current respiratory failure is volume related  -- current plan is for Ceftriaxone until 09/08 and then will need CXR and likely po Augmentin until follow up CT Chest with Pulmonary  --- seen by SLP, recommend regular/thin liquids  --- Follow up in Pulmonary clinic 09/25, plan CT chest to be done prior to visit  -- Follow up with Surgery outpatient            Metabolic alkalosis:  Suspect primary metabolic alkalosis due to Lasix and poor oral intake  -- labs stable, trend       Paroxysmal A-fib/a flutter; status post PPM  Aortic stenosis status post TAVR  Acute on chronic HFpEF with new right sided acute systolic CHF  TTE 8/31 showed preserved EF with new evidence of decreased RV systolic function  -- Currently on metoprolol, diltiazem and digoxin with hold parameters  -- She is off amiodarone due to QTc prolongation  -- PTA lasix 40mg - held due to metabolic alkalosis and pre-load dependence given new right sided heart failure  -- On Eliquis  -- Cardiology signed off x2        Acute on chronic anemia: likely hemodilutional from volume overload. CBC checked 8/30 and first draw was spurious since recheck Hgb shows more expected stability  -- trend CBC        Deconditioning   -- PT/OT recommend TCU      Hyperlipidemia: continue statin       DM2:  Not on home hypoglycemic meds  Last A1c 6.2  -- continue sliding scale insulin        Sacral erythema  Berna anal skin breakdown  -- Calmoseptine, WOC  "consult       Epigastric pain  Denies nausea, vomting  -- continue PPI, Tums prn and  Maalox prn               Diet: Fluid restriction 1800 ML FLUID  Regular Diet Adult    DVT Prophylaxis: DOAC  Chairez Catheter: Not present  Lines: PRESENT      PICC 08/10/24 Triple Lumen Right Brachial vein medial-Site Assessment: WDL except      Cardiac Monitoring: None  Code Status: Full Code      Clinically Significant Risk Factors              # Hypoalbuminemia: Lowest albumin = 2.2 g/dL at 8/18/2024  7:04 AM, will monitor as appropriate  # Coagulation Defect: INR = 1.81 (Ref range: 0.85 - 1.15) and/or PTT = 38 Seconds (Ref range: 22 - 38 Seconds), will monitor for bleeding  # Thrombocytopenia: Lowest platelets = 105 in last 2 days, will monitor for bleeding   # Hypertension: Noted on problem list           # Overweight: Estimated body mass index is 25.94 kg/m  as calculated from the following:    Height as of 8/10/24: 1.651 m (5' 5\").    Weight as of this encounter: 70.7 kg (155 lb 13.8 oz).        # Financial/Environmental Concerns: none   # Pacemaker present             Disposition Plan   Medically Ready for Discharge: Anticipated in 2-4 Days      Donte Dutta DO  Hospitalist Service  [facility]  Securely message with 1EQ (more info)  Text page via [employer] Paging/Directory   ______________________________________________________________________    Interval History   NAD. SOB and productive cough stable this AM    Physical Exam   Vital Signs: Temp: 97.7  F (36.5  C) Temp src: Oral BP: 116/60 Pulse: 88   Resp: 18 SpO2: 92 % O2 Device: High Flow Nasal Cannula (HFNC) Oxygen Delivery: 35 LPM  Weight: 155 lbs 13.84 oz  General: NAD  RESPIRATORY: Breathing nonlabored  CARDIOVASCULAR: No le edema bilat.   ABDOMEN: soft and non-tender  NEUROLOGIC: Motor and sensory intact, speech clear         >50 MINUTES SPENT BY ME on the date of service doing chart review, history, exam, documentation & further " activities per the note.  Data

## 2024-09-01 NOTE — PROGRESS NOTES
INFECTIOUS DISEASE FOLLOW UP NOTE      ASSESSMENT:  History of COPD.  History of aortic stenosis status post TAVR.  Status post pacemaker placement  Recent history of COVID-19 infection complicated with secondary bacterial pneumonia in middle July 2024.  Sputum cultures on 7/13/2024 with E. coli.  Right upper lobe collapse status post thoracentesis on 7/15/2024.  Readmitted with recurrent right-sided pleural effusion status post bronchoscopy on 8/11/2024.  Cultures with E. coli and Candida albicans.  Pleural effusion cultures positive with Streptococcus pneumonia. Chest tube placed 8/11/24. Now s/p VATS, unable to inflate lung. Bronch deferred due to O2 status. Improving, CXR looking better.      Yeast from BAL typically colonizer.     Worsening oxygenation 8/29-. Afebrile, normal WBC last check. Repeat CXR suggests interstitial edema. CT chest with increased size in pleural effusion, noted sign of pulmonary edema. Sputum culture negative. Thoracentesis 8/31 -- 800cc, transudate.      PLAN:  Ceftriaxone should be adequate for pneumococcus and E coli. Probably do not need to cover anaerobes (no teeth).    Expect about 4 weeks IV antibiotics from chest tube placement, this would be until 9/8/24, will plan for 9/10, will need Chest X ray around that time and consideration of switch to PO augmentin until chest CT with pulmonary.     Diuresis    Please call over holiday if needed. We will see again 9/3.    Ravindra Bacon MD  [facility]  Contact via [employer] or [facility] [phone]  ______________________________________________________________________    SUBJECTIVE / INTERVAL HISTORY: feels a little better. Still coughs, but stable. Temp normal. 800cc thoracentesis yesterday.    ROS: deconditioned. All other systems negative except as listed above.        OBJECTIVE:  BP 90/54 (BP Location: Left arm)   Pulse 78   Temp 98  F (36.7  C) (Oral)   Resp 18   Wt 70.7 kg (155 lb 13.8  oz)   LMP  (LMP Unknown)   SpO2 90%   BMI 25.94 kg/m         Vital Signs  Temp: 97.4  F (36.3  C)  Temp src: Oral  Resp: 20  Pulse: 82  Pulse Rate Source: Monitor  BP: 102/59  BP Location: Left arm    Temp (24hrs), Av.3  F (36.8  C), Min:97.4  F (36.3  C), Max:99.1  F (37.3  C)      GEN: No acute distress.  HFNC  RESPIRATORY:  Chest tube on right removed. normal respiratory effort.   CARDIOVASCULAR:  regular  ABDOMEN:  mildly tender RUQ  EXTREMITIES: No edema.  SKIN/HAIR/NAILS:  No rashes  IV: PICC        Antibiotics:  ceftriaxone  On either zosyn or ceftri since 8/10  Off fluconazole    Pertinent labs:    Recent Labs   Lab 24  0641 24  0545 24  2051   WBC 3.7* 4.3 3.9*   HGB 8.0* 8.0* 7.7*   HCT 28.3* 28.8* 27.5*   * 119* 105*        Recent Labs   Lab 24  0641 24  0545 24  0452    144 142   CO2 45* 46* 42*   BUN 9.1 8.8 9.1          Lab Results   Component Value Date    ALT 31 2024    AST 20 2024    ALKPHOS 105 2024         MICROBIOLOGY DATA:  Susceptibility data from last 90 days.  Collected Specimen Info Organism Ampicillin Ampicillin/Sulbactam Azithromycin Cefepime Ceftazidime Ceftriaxone Ceftriaxone (meningitis) Ceftriaxone (non-meningitis) Ciprofloxacin Clindamycin Gentamicin Levofloxacin Meropenem   24 Bronchial Alveolar Lavage from Lung, Upper Lobe, Right Escherichia coli                  Normal anna marie                24 Bronchial Alveolar Lavage from Lung, Right Yeast                24 Bronchial Alveolar Lavage from Lung, Lower Lobe, Right Escherichia coli                  Normal anna marie                24 Bronchial Alveolar Lavage from Lung, Right Candida albicans                08/10/24 Sputum from Expectorate Streptococcus pneumoniae    S     S  S   S   S      Escherichia coli  S  S   S  S  S    S   S  S  S   08/10/24 Pleural fluid from Pleural Cavity, Right Streptococcus pneumoniae    S     S  S   S   S     07/13/24 Sputum from Expectorate Escherichia coli  S  S   S  S  S    S   S  S  S     Normal anna marie                  Collected Specimen Info Organism Penicillin (meningitis) Penicillin (non-meningitis) Penicillin(oral) Piperacillin/Tazobactam Tobramycin Trimethoprim/Sulfamethoxazole  Vancomycin   08/11/24 Bronchial Alveolar Lavage from Lung, Upper Lobe, Right Escherichia coli            Normal anna marie          08/11/24 Bronchial Alveolar Lavage from Lung, Right Yeast          08/11/24 Bronchial Alveolar Lavage from Lung, Lower Lobe, Right Escherichia coli            Normal anna marie          08/11/24 Bronchial Alveolar Lavage from Lung, Right Candida albicans          08/10/24 Sputum from Expectorate Streptococcus pneumoniae  S  S  S     S     Escherichia coli     S  S  S    08/10/24 Pleural fluid from Pleural Cavity, Right Streptococcus pneumoniae  S  S  S     S   07/13/24 Sputum from Expectorate Escherichia coli     S  S  S      Normal anna marie            8/30 sputum -- low PMNs, low s epi cells    RADIOLOGY:  US Thoracentesis    Result Date: 8/31/2024  EXAM: 1. LEFT THORACENTESIS 2. ULTRASOUND GUIDANCE LOCATION: [facility] DATE: 8/31/2024 INDICATION: Pleural effusion. PROCEDURE: Informed consent obtained. Time out performed. The chest was prepped and draped in sterile fashion. 6 mL of 1 % lidocaine was infused into the local soft tissues. Under direct ultrasound guidance, a 5 Georgian catheter system was placed into the  pleural effusion. 0.8 liters of clear anders-colored fluid were removed and sent to lab, if requested. Patient tolerated procedure well. Ultrasound imaging was obtained and placed in the patient's permanent medical record.     IMPRESSION: Status post left ultrasound-guided thoracentesis. Reference CPT Code: 31374    Echocardiogram Limited    Result Date: 8/31/2024  [id number]^COTY^JENNIFER^[place] [address]   Name: ALVAREZ HINDS MRN: [id number] : 1950 Study Date: 2024 10:27 AM Age: 74 yrs Gender: Female Patient Location: [facility] Reason For Study: Dyspnea History: TAVR (23 mm Douglas Mai 3 Resilia) Ordering Physician: JENNIFER ANSARI Referring Physician: MAL OVERTON Performed By: NATALIE  BSA: 1.8 m2 Height: 65 in Weight: 156 lb HR: 86 BP: 104/55 mmHg ______________________________________________________________________________ Procedure Limited Portable Echo Adult. Definity (NDC #15415-248) given intravenously. ______________________________________________________________________________ Interpretation Summary  Left ventricular function is normal.The ejection fraction is 55-60%. The right ventricle is mild to moderately dilated. Moderately decreased right ventricular systolic function The right atrium is moderately dilated. There is a bioprosthetic aortic valve. There is no paravalvular regurgitation present. The study was technically difficult. ______________________________________________________________________________ Left Ventricle The left ventricle is normal in size. Left ventricular function is normal.The ejection fraction is 55-60%. There is normal left ventricular wall thickness. No regional wall motion abnormalities noted.  Right Ventricle The right ventricle is mild to moderately dilated. Moderately decreased right ventricular systolic function. There is a pacemaker lead in the right ventricle.  Atria The left atrium is mildly dilated. The right atrium is moderately dilated. There is no color Doppler evidence of an atrial shunt.  Mitral Valve There is mild mitral annular calcification.  Tricuspid Valve Right ventricle systolic pressure estimate normal.  Aortic Valve There is a bioprosthetic aortic valve. There is no paravalvular regurgitation present.  Pulmonic Valve The pulmonic valve is not well seen, but is grossly normal. This degree of valvular regurgitation is within  normal limits.  Vessels The aorta root is normal. Normal size ascending aorta. IVC diameter and respiratory changes fall into an intermediate range suggesting an RA pressure of 8 mmHg.  Pericardium There is no pericardial effusion.  ______________________________________________________________________________ MMode/2D Measurements & Calculations IVSd: 0.78 cm LVIDd: 5.0 cm LVIDs: 3.4 cm LVPWd: 0.79 cm FS: 32.5 % LV mass(C)d: 133.7 grams LV mass(C)dI: 75.1 grams/m2  Ao root diam: 2.4 cm LA dimension: 3.4 cm LA/Ao: 1.4 LVOT diam: 1.9 cm LVOT area: 2.8 cm2 Ao root diam index Ht(cm/m): 1.5 Ao root diam index BSA (cm/m2): 1.3 RV Base: 4.4 cm RWT: 0.32 TAPSE: 1.0 cm  ______________________________________________________________________________ Report approved by: Diamante Quintero 08/31/2024 12:33 PM       CT Chest w/o Contrast    Result Date: 8/31/2024  EXAM: CT CHEST WITHOUT CONTRAST LOCATION: [facility] DATE: 08/31/2024 INDICATION: Right lower lobe infiltrate status post VATS, now with increasing oxygen requirements. COMPARISON: CT chest 08/20/2024. TECHNIQUE: CT chest without IV contrast. Multiplanar reformats were obtained. Dose reduction techniques were used. CONTRAST: None. FINDINGS: LUNGS AND PLEURA: Moderate left pleural fluid is slightly larger. No left pneumothorax. Interval removal of right chest tube. There is persistent complex small volume right pleural effusion with small pneumothorax gas at the right chest base. The pneumothorax gas is smaller in size in the interval. Some apparent debris at the pleural fluid posteriorly is again suggested on series 2 image 47. Moderate right and left base atelectasis. Small aspirated material within the right main bronchus image 58, though the volume of aspirated material appears decreased in the interval. Emphysematous changes. Mildly increased hazy opacities at the left upper lobe. Mild smooth interstitial thickening bilaterally.  MEDIASTINUM/AXILLAE: Scattered thoracic aortic calcifications without acute abnormality. Stable small mediastinal lymph nodes. No new acute mediastinal abnormality. Left chest pacer. Right PICC line tip at the mid SVC. TAVR. CORONARY ARTERY CALCIFICATION: Severe. UPPER ABDOMEN: Stable hypodense enlargement of the bilateral adrenals. In particular, the left adrenal is 3.4 cm. Low-density suggests adenomas. No acute upper abdominal abnormality. MUSCULOSKELETAL: Diffuse degenerative changes of the spine. No focal bony abnormality.     IMPRESSION: 1.  Removal of right chest tube. Persistent complex small stable volume right-sided pleural fluid. Suggestion of some debris within the fluid at the right chest base. Bubbles of pneumothorax gas on the right are smaller in size. 2.  Moderate left pleural fluid is slightly larger. 3.  Increased hazy opacities at the left upper lobe. Bilateral smooth interstitial thickening. These findings suggest a degree of pulmonary edema. 4.  Aspirated material leading to the right lower lobe again seen, but the aspirated material appears smaller since 08/20/2024. Moderate bibasilar atelectasis. 5.  A few additional stable findings.     XR Chest Port 1 View    Result Date: 8/30/2024  EXAM: XR CHEST PORT 1 VIEW LOCATION: [facility] DATE: 8/30/2024 INDICATION: Worsening hypoxia. COMPARISON: 6/29/2024.     IMPRESSION: Prior increased left basilar opacification has mildly improved. Otherwise, no significant change of bilateral mid to lower lung predominant opacities / atelectasis and small pleural effusions. Interstitial prominence has increased, suggesting pulmonary edema. Stable cardiomediastinal silhouette. Pacemaker. TAVR. Right PICC tip over the distal SVC. No pneumothorax.     XR Chest Port 1 View    Result Date: 8/29/2024  EXAM: XR CHEST PORT 1 VIEW LOCATION: [facility] DATE: 8/29/2024 INDICATION: Hypoxia, cough, eval for intervel change  COMPARISON: 8/27/2024 and multiple prior studies, CT chest 8/20/2024     IMPRESSION: Left costophrenic angle is clipped. Prior TAVR and dual lead left subclavian venous pacer. Right upper extremity PICC line catheter is in good position. Increased opacification in the left lower lobe of concern for worsening pneumonia. Volume loss on the right with small effusion and moderate right infrahilar opacities are unchanged. No visible pneumothorax.    XR Chest Port 1 View    Result Date: 8/27/2024  EXAM: XR CHEST PORT 1 VIEW LOCATION: [facility] DATE: 8/27/2024 INDICATION: Recheck Chest tube, s p VATS 8 19, right lung re expansion with pulmonary toileting COMPARISON: 8/26/2024     IMPRESSION: Very minimal increase in aeration in the right lung. There still remains significant atelectasis right lung base with pleural fluid or thickening. Minimal pleural fluid or thickening left lung base with atelectasis. Cardiac enlargement. Pulmonary vascular congestion. Aortic calcification. AVR. Right PICC with tip in the low SVC. Left-sided cardiac pacemaker.    XR Chest Port 1 View    Result Date: 8/26/2024  EXAM: XR CHEST PORT 1 VIEW LOCATION: [facility] DATE: 8/26/2024 INDICATION: Recheck Chest tube, s p VATS 8 19, right lung re expansion with pulmonary toileting COMPARISON: 8/25/2024.     IMPRESSION: The heart is unchanged in appearance. The right PICC and dual-lead pacing device as well as the cardiac valvular prosthesis are all unchanged. The lungs are otherwise unchanged appearance    XR Chest Port 1 View    Result Date: 8/25/2024  EXAM: XR CHEST PORT 1 VIEW LOCATION: [facility] DATE: 8/25/2024 INDICATION: s p chest tube removal. COMPARISON: 8/5/2024 at 0616 hours     IMPRESSION: Prior seen right chest tube has been removed with no evidence for a pneumothorax. Otherwise the chest is stable.    XR Chest Port 1 View    Result Date: 8/25/2024  EXAM: XR CHEST  PORTABLE 1 VIEW LOCATION: [facility] DATE: 08/25/2024 INDICATION: Recheck chest tube, status post VATS 08/19, right lung re-expansion with pulmonary toileting. COMPARISON: Yesterday.     IMPRESSION: Right PICC line tip in the SVC. Right chest tube with tip projecting over the right upper lung. No pneumothorax. Endovascular aortic valve replacement. Left infraclavicular pacemaker with lead tips projecting over the right atrium and right ventricle. Heart size upper limits of normal. Mild to moderate pulmonary vascular congestion with bilateral pleural effusions and associated compressive atelectasis. Degenerative changes bilateral shoulders, greater on the right.     XR Chest Port 1 View    Result Date: 8/24/2024  EXAM: XR CHEST PORT 1 VIEW LOCATION: [facility] DATE: 8/24/2024 INDICATION: Recheck Chest tube, s p VATS 8 19, right lung re expansion with pulmonary toileting COMPARISON: 8/23/2024.     IMPRESSION: Right apical chest tube remains in position. Right PICC tip in the mid SVC. Post endovascular repair of the aortic valve. Left subclavian pacemaker unchanged. Improved aeration of the right lung. No pneumothorax visible. Small bilateral pleural effusions and atelectasis/infiltrate of both lower lungs. Interstitial prominence consistent with mild edema. Stable cardiomegaly.    XR Chest Port 1 View    Result Date: 8/23/2024  EXAM: XR CHEST PORT 1 VIEW LOCATION: [facility] DATE: 8/23/2024 INDICATION: Recheck chest tube and right lung s p VATS and placement of water seal done on 8 22 at 1220 COMPARISON: Yesterday     IMPRESSION: Postthoracotomy changes on the right with large bore chest tube. Right upper extremity PICC line catheter in good position. Dual-lead left subclavian venous pacer. TAVR.  Shallower inspiration., Likely little change in the small, loculated hydropneumothorax in the right. Small right apical cap appears unchanged.  Stable left pleural effusion with infiltrate or atelectasis in the left base.     XR Chest Port 1 View    Result Date: 8/22/2024  EXAM: XR CHEST PORT 1 VIEW LOCATION: [facility] DATE: 8/22/2024 INDICATION: Recheck chest tube and pleural effusions. Postop thoracotomy and empyema drainage. COMPARISON: CT chest and chest x-ray 8/20/2024 and older studies     IMPRESSION: Postthoracotomy changes on the right with large bore chest tube. Right upper extremity PICC line catheter in good position. Dual-lead left subclavian venous pacer. TAVR. Shallower inspiration., Likely little change in the small, loculated hydropneumothorax in the right. Small right apical cap appears slightly decreased from before. Increased atelectasis in the left base.    CT Chest w/o Contrast    Result Date: 8/20/2024  EXAM: CT CHEST W/O CONTRAST LOCATION: [facility] DATE: 8/20/2024 INDICATION: ?LLL mass or occlusion, unable to re expand lung. COMPARISON: 8/16/2024 TECHNIQUE: CT chest without IV contrast. Multiplanar reformats were obtained. Dose reduction techniques were used. Lack of intravenous contrast significantly limits exam. CONTRAST: None. FINDINGS: LUNGS AND PLEURA: Interval placement of large bore right-sided chest tube with decrease in right pleural effusion. There is a moderate loculated appearing residual. Small anterior pneumothorax now noted. There is improved aeration of the right upper lobe, with persistent right middle lobe and lower lobe collapse and mediastinal shift. Centrilobular emphysematous change with an upper lobe predominance. Increasing, now moderate left pleural effusion with compressive basilar atelectasis. Moderate retained secretions in the central airways. Significant retained secretions in right lower lobe bronchi. MEDIASTINUM/AXILLAE: Right-sided PICC line terminates in the distal SVC. Cardiac pacemaker. TAVR. CORONARY ARTERY CALCIFICATION: Severe. UPPER ABDOMEN:  Right adrenal nodular hyperplasia. Significant enlargement of the left adrenal gland, potentially containing multiple low-attenuation nodules measuring up to 2 cm in size. Adenomatous hyperplasia favored. MUSCULOSKELETAL: No acute bony abnormalities.     IMPRESSION: 1.  Decreased right pleural effusion status post chest tube placement, with moderate, likely loculated/complicated residual, as well as small associated right pneumothorax now evident. 2.  Reexpansion of the right upper lobe with persistent collapse of right middle and lower lobe segments. Significant retained secretions, greatest in the right lower lobe. 3.  Increasing left pleural effusion. 4.  Additional findings as above.     XR Chest Port 1 View    Result Date: 8/20/2024  EXAM: XR CHEST PORT 1 VIEW LOCATION: [facility] DATE: 8/20/2024 INDICATION: Recheck chest tube and pleural effusions. Postop thoracotomy and empyema drainage. COMPARISON: 8/19/2024 and older studies, CT chest 8/16/2024 and older studies     IMPRESSION: Postthoracotomy changes with large bore right-sided chest tube. Removal of the right basilar pigtail chest tube. Dual-lead left subclavian venous pacer, TAVR and right upper extremity PICC line catheter remain in good position. There has been only partial reexpansion of the right upper lobe and apparently non of the right lower lobe. Likely small pneumothorax outlining collapsed lung in the right lateral costophrenic angle. Small right apical pleural cap persists. Volume loss with marked shift of the mediastinum into the right chest has only slightly decreased. Minimal atelectasis in the left base behind the heart with trace effusion again noted. No signs of pneumonia or failure.    XR Chest Port 1 View    Result Date: 8/19/2024  EXAM: XR CHEST PORT 1 VIEW LOCATION: [facility] DATE: 8/19/2024 INDICATION: Recheck chest tube and pleural effusions COMPARISON: August 18, 2024      IMPRESSION: Stable pacemaker, TAVR, right chest tube, right PICC. Minimal change in near complete opacification of the right hemithorax with volume loss.    XR Chest Port 1 View    Result Date: 8/18/2024  EXAM: XR CHEST PORT 1 VIEW LOCATION: [facility] DATE: 8/18/2024 INDICATION: Recheck chest tube and pleural effusions COMPARISON: 8/17/2024     IMPRESSION: No change since yesterday. Small caliber chest tube projected at the right lung base unchanged. Complete opacification of the right hemithorax with volume loss unchanged. Right PICC line tip in low SVC. Transvenous pacemaker. Hyperinflation left lung.    POC US Chest B-Scan    Limited Bedside Lung Ultrasound, performed and interpreted by me. Indication: empyema and dyspnea With the patient positioned supine, right posterior and lateral lung fields were examined for evidence of thoracic free fluid, pulmonary consolidation, and pulmonary edema. Findings: moderate right pleural effusion noted, mostly free flowing. Some debris noted (plankton sign) Chest tube visualized in the pleural effusion. Right lung atelectasis noted. No obvious loculations although scanning was limited due to presence of dressing. IMPRESSION: moderate to large right pleural effusion with chest tube in place. No obvious loculations noted on this limited bedside pleural/lung POCUS.      XR Chest Port 1 View    Result Date: 8/17/2024  EXAM: XR CHEST PORT 1 VIEW LOCATION: [facility] DATE: 8/17/2024 INDICATION: Recheck chest tube and pleural effusions COMPARISON: CT chest without contrast 08/16/2024, chest radiograph 08/15/2024     IMPRESSION: Stable position of the right basilar pigtail chest tube. Stable complete opacification of the right hemithorax with rib crowding compatible with large pleural effusion and associated collapse of the right lung. Right upper extremity PICC line  with tip overlying the mid aspect of the SVC.  Cardiomediastinal silhouette is partially obscured by the above process however appears grossly stable. Aortic valve replacement. Stable position of the left chest pacemaker. Trace left pleural effusion. Streaky opacities at the left lung base favoring atelectasis. No pneumothorax. Unchanged osseous structures.    CT Chest w/o Contrast    Result Date: 8/16/2024  EXAM: CT CHEST W/O CONTRAST LOCATION: [facility] DATE: 8/16/2024 INDICATION: Recheck chest tube placement, clogged, etc. and recheck effusion given patient increase O2 and tachycardia COMPARISON: 8/13/2024 TECHNIQUE: CT chest without IV contrast. Multiplanar reformats were obtained. Dose reduction techniques were used. CONTRAST: None. FINDINGS: LUNGS AND PLEURA: Large right pleural effusion increased from previous. Complete collapse of the right lung. The right mainstem, upper and lower lobe bronchi are essentially completely occluded increased from previous. Single right chest tube. Small left pleural effusion with passive atelectasis left lung base slightly increased from previous. MEDIASTINUM/AXILLAE: Transvenous pacemaker. Aortic valve prosthesis. Right PICC line tip in SVC. CORONARY ARTERY CALCIFICATION: Severe. UPPER ABDOMEN: No significant finding. MUSCULOSKELETAL: Unremarkable.     IMPRESSION: 1.  Large right pleural effusion increased from previous despite the presence of the chest tube. 2.  Complete collapse of the right lung with complete opacification of the right upper lobe, right lower lobe and right mainstem bronchus increased from previous. 3.  Small left pleural effusion increased from previous.     XR Chest 1 View    Result Date: 8/15/2024  EXAM: XR CHEST 1 VIEW LOCATION: [facility] DATE: 8/15/2024 INDICATION: Treated for empyema, s p chest tube , follow up pleural effusion COMPARISON: CT chest 8/13/2024, chest radiograph 8/11/2024     IMPRESSION: Unchanged position of the pigtail chest  tube in the right lung base with similar complete opacification of the right hemithorax. Trace left effusion with dependent atelectasis. Background emphysema. Cardiac silhouette and mediastinal contours  are mostly obscured. Aortic valve replacement. Left chest cardiac generator and leads are unchanged. Right upper extremity PICC tip terminates in the mid SVC.    XR Video Swallow with SLP or OT    Result Date: 8/14/2024  EXAM: XR VIDEO SWALLOW WITH SLP OR OT LOCATION: [facility] DATE: 8/14/2024 INDICATION: Difficulty swallowing. COMPARISON: None. TECHNIQUE: Routine swallow study with speech pathology using multiple barium thicknesses. RADIATION DOSE: Dose Area Product 23.86 microGy-m2 FINDINGS: Swallow study with Speech Pathology using multiple barium thicknesses. Penetration with thin liquids, which slightly improved with chin tuck maneuver and decreased swallow volumes. No definite aspiration visualized with trialed consistencies.     IMPRESSION: Penetration with thin liquids, however no aspiration visualized. Please see speech pathology report for further details and recommendations.    CT Chest w/o Contrast    Result Date: 8/14/2024  EXAM: CT CHEST W/O CONTRAST LOCATION: [facility] DATE: 8/13/2024 INDICATION: empyema s p chest tube, ?evacuated COMPARISON: 8/10/2024 TECHNIQUE: CT chest without IV contrast. Multiplanar reformats were obtained. Dose reduction techniques were used. CONTRAST: None. FINDINGS: LUNGS AND PLEURA: Right basilar pigtail pleural drain in place. Large right pleural effusion with complete atelectasis of the right lung. A few foci of gas in the pleural space. Small left pleural effusion. Emphysema. Mild frothy secretions in trachea. Debris occludes the central right lung airways. MEDIASTINUM/AXILLAE: Stable left subclavian approach pacemaker and TAVR. Right PICC with tip near the cavoatrial junction. Rightward mediastinal shift. No  lymphadenopathy. CORONARY ARTERY CALCIFICATION: Moderate. UPPER ABDOMEN: Unchanged thickening of the left adrenal gland. MUSCULOSKELETAL: Unremarkable     IMPRESSION: 1.  Large right pleural effusion with right lung collapse.     US Abdomen Limited    Result Date: 8/12/2024  EXAM: US ABDOMEN LIMITED LOCATION: [facility] DATE: 8/12/2024 INDICATION: Abnormal LFTs. COMPARISON: CT 8/10/2024 TECHNIQUE: Limited abdominal ultrasound. FINDINGS: Study is limited by a chest tube and a compression dressing on the right side. Within limitations, findings are as follows. GALLBLADDER: Limited evaluation is within normal limits. No gallstones, wall thickening, or pericholecystic fluid. Negative sonographic Ospina's sign. BILE DUCTS: No biliary dilatation. The common duct measures 7 mm. LIVER: Normal parenchyma with smooth contour. No focal mass. RIGHT KIDNEY: Not seen due to bowel gas. PANCREAS: The pancreas is largely obscured by overlying gas. No ascites.     IMPRESSION: 1.  Normal limited abdominal ultrasound within limitations detailed above.     Cardiac Device Check - Remote    Result Date: 8/12/2024  Encounter Type: alert remote pacemaker transmission for AT/AF >/=6hrs for 2 days. Device: [employer] M. Cintron. Pacing % /Programmed: AP 43%,  <1% at AAIR<=>DDDR 60/130 ppm. Lead(s): stable. Battery longevity: 14yrs, 3mo estimated. Presenting: Atrial flutter with ventricular sensing  bpm. Atrial high rates: since 4/29/24; 24 mode switch episodes, AT/AF appears to be in progress since 7/21/24 2:45PM, burden <1%, v-rates >/=120bpm ~95%. 19 fast A&V episodes, available EGMs appear to be RVR during AF. Anticoagulant: Eliquis. Ventricular High rates: since 4/29/24; None detected. Comments: Normal device function. Plan: Routed to device RN for review. MICHELLE Padgett, Device Specialist ADD: Transmission reviewed, see EPIC for details. Zofia Hodges RN  Device follow up for the entirety of having the Pacemaker,  based on best practices determined by [employer] and in Compliance with Medicare guidelines. Continue remote device monitoring per patient plan. I have reviewed and interpreted the device interrogation, settings, programming, and encounter summary. The device is functioning within normal device parameters. I agree with the current findings, assessment and plan.    XR Chest 1 View    Result Date: 8/11/2024  EXAM: XR CHEST 1 VIEW LOCATION: [facility] DATE: 08/11/2024 INDICATION: New chest tube placement. COMPARISON: 08/10/2024 CXR and CT chest.     IMPRESSION: Interval placement right basilar pleural drainage catheter. Previously seen large right pleural effusion has been nearly completely evacuated and the mid and lower right lung have partially reexpanded. No pneumothorax. Left lung clear. Mild cardiac enlargement. Transcatheter aortic valve replacement. Pacer anterior left chest wall with lead tips in the RA and RV. Right PICC tip RA/SVC junction.     XR Chest Port 1 View    Result Date: 8/10/2024  EXAM: XR CHEST PORT 1 VIEW LOCATION: [facility] DATE: 8/10/2024 INDICATION: RN placed PICC   verify tip placement COMPARISON: 8/10/2024     IMPRESSION: New right PICC with tip near the cavoatrial junction. Otherwise, no significant interval change.    XR Chest Port 1 View    Result Date: 8/10/2024  EXAM: XR CHEST PORTABLE 1 VIEW LOCATION: [facility] DATE: 08/10/2024 INDICATION: Status post right pigtail chest tube. COMPARISON: 08/10/2024 at 1010 hours.     IMPRESSION: No change in dual-lead cardiac pacer or TAVR. Right-sided chest tube has been placed since comparison study with decrease in the previously seen large right pleural effusion. A moderate to large pleural effusion remains. There is likely overlying compressive atelectasis of the inferior portion of the right lung with concurrent infectious process not excluded. There is  some indistinctness of the pulmonary interstitium involving the left lower lobe which is new from prior study and may reflect airway inflammation or edema.     CT Chest w Contrast    Result Date: 8/10/2024  EXAM: CT CHEST W CONTRAST LOCATION: [facility] DATE: 8/10/2024 INDICATION: SOB, large right-sided pleural effusion status post right pigtail COMPARISON: None. TECHNIQUE: CT chest with IV contrast. Multiplanar reformats were obtained. Dose reduction techniques were used. CONTRAST: 90 mL [id number] FINDINGS: LUNGS AND PLEURA: Large right effusion. Pigtail catheter is at the anterior right apex without significant fluid surrounding it. Extensive compressive atelectasis throughout the right lung. Left lung clear. MEDIASTINUM/AXILLAE: No lymphadenopathy. No thoracic aneurysm. CORONARY ARTERY CALCIFICATION: Moderate. UPPER ABDOMEN: No acute findings. MUSCULOSKELETAL: No frankly destructive bony lesions.     IMPRESSION: 1.  Large right effusion and extensive compressive atelectasis versus less likely infiltrate in the right lung. 2.  The pigtail catheter tip is at the anterior apex, most of the fluid is at the base.        XR Chest Port 1 View    Result Date: 8/10/2024  EXAM: XR CHEST PORT 1 VIEW LOCATION: [facility] DATE: 8/10/2024 INDICATION: sob, hypoxic, history of prior ptx, eval for ptx, pna or edema COMPARISON: 7/17/2020     IMPRESSION: Large right pleural effusion. No pneumothorax. Left subclavian approach pacing device. Prosthetic aortic valve. Cardiac silhouette within normal limits. No acute osseous abnormality.

## 2024-09-01 NOTE — PLAN OF CARE
Problem: Adult Inpatient Plan of Care  Goal: Absence of Hospital-Acquired Illness or Injury  Intervention: Prevent Skin Injury  Recent Flowsheet Documentation  Taken 9/1/2024 0201 by Anna Espinosa RN  Body Position: position changed independently  Device Skin Pressure Protection: absorbent pad utilized/changed     Problem: Comorbidity Management  Goal: Blood Glucose Levels Within Targeted Range  Intervention: Monitor and Manage Glycemia  Recent Flowsheet Documentation  Taken 9/1/2024 0201 by Anna Espinosa RN  Medication Review/Management: medications reviewed   Goal Outcome Evaluation:    A/O, denies pain, BP soft  Sats maintained with high flow 40 L at 48%  Restful overnight no s/s of respiratory distress overnight   2am BG check within limits

## 2024-09-01 NOTE — PROGRESS NOTES
Pt remains on HF O2. Pt states she is comfortable. Soft Bps. MD notified. Gave midodrine x1. Continue to monitor BP. FR 1800. Red lumen of PICC occluded. TPA placed. Harlan back 10ml, difficult, sluggish. Second round TPA red lumen. Harlan back, difficult, sluggish. Able to flush but sluggish. Updated oncoming RN.

## 2024-09-01 NOTE — PLAN OF CARE
"  Problem: Adult Inpatient Plan of Care  Goal: Plan of Care Review  Description: The Plan of Care Review/Shift note should be completed every shift.  The Outcome Evaluation is a brief statement about your assessment that the patient is improving, declining, or no change.  This information will be displayed automatically on your shift  note.  Outcome: Progressing  Goal: Patient-Specific Goal (Individualized)  Description: You can add care plan individualizations to a care plan. Examples of Individualization might be:  \"Parent requests to be called daily at 9am for status\", \"I have a hard time hearing out of my right ear\", or \"Do not touch me to wake me up as it startles  me\".  Outcome: Progressing  Goal: Absence of Hospital-Acquired Illness or Injury  Outcome: Progressing  Intervention: Identify and Manage Fall Risk  Recent Flowsheet Documentation  Taken 9/1/2024 0800 by Mala Caballero RN  Safety Promotion/Fall Prevention:   activity supervised   assistive device/personal items within reach  Intervention: Prevent Infection  Recent Flowsheet Documentation  Taken 9/1/2024 0800 by Mala Caballero RN  Infection Prevention: hand hygiene promoted  Goal: Optimal Comfort and Wellbeing  Outcome: Progressing  Intervention: Provide Person-Centered Care  Recent Flowsheet Documentation  Taken 9/1/2024 0800 by Mala Caballero RN  Trust Relationship/Rapport:   care explained   choices provided  Goal: Readiness for Transition of Care  Outcome: Progressing     Problem: Gas Exchange Impaired  Goal: Optimal Gas Exchange  Outcome: Progressing     Problem: Dysrhythmia  Goal: Normalized Cardiac Rhythm  Outcome: Progressing     Problem: Comorbidity Management  Goal: Maintenance of COPD Symptom Control  Outcome: Progressing  Goal: Blood Glucose Levels Within Targeted Range  Outcome: Progressing  Goal: Blood Pressure in Desired Range  Outcome: Progressing     Problem: Risk for Delirium  Goal: Optimal Coping  Outcome: Progressing  Goal: Improved " Behavioral Control  Outcome: Progressing  Intervention: Minimize Safety Risk  Recent Flowsheet Documentation  Taken 9/1/2024 0800 by Mala Caballero RN  Communication Enhancement Strategies:   call light answered in person   communication board used  Trust Relationship/Rapport:   care explained   choices provided  Goal: Improved Attention and Thought Clarity  Outcome: Progressing  Intervention: Maximize Cognitive Function  Recent Flowsheet Documentation  Taken 9/1/2024 0800 by Mala Caballero RN  Reorientation Measures: clock in view  Goal: Improved Sleep  Outcome: Progressing     Problem: Respiratory Compromise (Pneumothorax)  Goal: Optimal Oxygenation and Ventilation  Outcome: Progressing  Intervention: Manage Pneumothorax Effects  Recent Flowsheet Documentation  Taken 9/1/2024 0800 by Mala Caballero RN  Administration (IS): instruction provided, follow-up  Level Incentive Spirometer (mL): 250  Number of Repetitions (IS): 10  Patient Tolerance (IS): fair     Problem: Skin Injury Risk Increased  Goal: Skin Health and Integrity  Outcome: Progressing  Intervention: Plan: Nurse Driven Intervention: Moisture Management  Recent Flowsheet Documentation  Taken 9/1/2024 0800 by Mala Caballero RN  Moisture Interventions: Encourage regular toileting  Intervention: Plan: Nurse Driven Intervention: Friction and Shear  Recent Flowsheet Documentation  Taken 9/1/2024 0800 by Mala Caballero RN  Friction/Shear Interventions: HOB 30 degrees or less  Intervention: Optimize Skin Protection  Recent Flowsheet Documentation  Taken 9/1/2024 0800 by Mala Caballero RN  Activity Management:   activity adjusted per tolerance   activity encouraged     Problem: Mobility Impairment  Goal: Optimal Mobility  Outcome: Progressing  Intervention: Optimize Mobility  Recent Flowsheet Documentation  Taken 9/1/2024 0800 by Mala Caballero RN  Activity Management:   activity adjusted per tolerance   activity encouraged     Problem: Fluid Volume Excess  Goal: Fluid  Balance  Outcome: Progressing   Goal Outcome Evaluation:             Pt alert and oriented. Denies pain. States her breathing is comfortable at rest. LS clear, diminished. Remains on HFNC 35L 38 fiO2, sats 88-92%. One episode after rolling to wash up pt slight SOB and sats low 80s. Cough, deep breathe, IS use and was able to come up. Pt sat edge of bed this morning about 30 min. Tolerated well. Denied dizziness or SOB at the time. Soft BP this afternoon 90/54, diltiazem held per parameters. Appetite ok. Ate 75% late breakfast. FR 1800.

## 2024-09-01 NOTE — PLAN OF CARE
Patient is A&Ox4. She is cooperative with cares. Patient continues on high javier, 40%, 50L. O2 sats 94%.  Denies pain.  Patient is cooperative with fluid restriction. She did not eat dinner. Patient c/o no appetite. BG at HS was 85 and pt was given small amt of juice. BG up to 93. Red lumen of PICC continues to be difficult to flush, but was able to flush with 10 ml. BP at end of shift was 98/53.  Problem: Adult Inpatient Plan of Care  Goal: Absence of Hospital-Acquired Illness or Injury  Outcome: Progressing  Intervention: Identify and Manage Fall Risk  Recent Flowsheet Documentation  Taken 8/31/2024 2011 by Ammy Toro RN  Safety Promotion/Fall Prevention:   activity supervised   assistive device/personal items within reach  Intervention: Prevent Skin Injury  Recent Flowsheet Documentation  Taken 8/31/2024 2011 by Ammy Toro RN  Body Position:   supine, legs elevated   supine, head elevated  Device Skin Pressure Protection: absorbent pad utilized/changed  Intervention: Prevent Infection  Recent Flowsheet Documentation  Taken 8/31/2024 2011 by Ammy Toro RN  Infection Prevention: hand hygiene promoted  Goal: Optimal Comfort and Wellbeing  Outcome: Progressing  Intervention: Provide Person-Centered Care  Recent Flowsheet Documentation  Taken 8/31/2024 2011 by Ammy Toro RN  Trust Relationship/Rapport:   care explained   choices provided     Problem: Gas Exchange Impaired  Goal: Optimal Gas Exchange  Outcome: Progressing  Intervention: Optimize Oxygenation and Ventilation  Recent Flowsheet Documentation  Taken 8/31/2024 2011 by Ammy Toro, RN  Head of Bed (HOB) Positioning: HOB at 15 degrees     Problem: Comorbidity Management  Goal: Maintenance of COPD Symptom Control  Outcome: Progressing  Intervention: Maintain COPD Symptom Control  Recent Flowsheet Documentation  Taken 8/31/2024 2011 by Ammy Toro, RN  Supportive Measures: active listening utilized  Medication Review/Management:  medications reviewed     Problem: Risk for Delirium  Goal: Improved Behavioral Control  Outcome: Progressing  Intervention: Prevent and Manage Agitation  Recent Flowsheet Documentation  Taken 8/31/2024 2011 by Ammy Toro RN  Environment Familiarity/Consistency: daily routine followed  Intervention: Minimize Safety Risk  Recent Flowsheet Documentation  Taken 8/31/2024 2011 by Ammy Toro, RN  Communication Enhancement Strategies:   call light answered in person   communication board used  Enhanced Safety Measures: room near unit station  Trust Relationship/Rapport:   care explained   choices provided     Problem: Respiratory Compromise (Pneumothorax)  Goal: Optimal Oxygenation and Ventilation  Outcome: Progressing     Problem: Fluid Volume Excess  Goal: Fluid Balance  Outcome: Progressing  Intervention: Monitor and Manage Hypervolemia  Recent Flowsheet Documentation  Taken 8/31/2024 2011 by Ammy Toro RN  Fluid/Electrolyte Management:   fluids provided   fluids restricted     Problem: Comorbidity Management  Goal: Blood Glucose Levels Within Targeted Range  Intervention: Monitor and Manage Glycemia  Recent Flowsheet Documentation  Taken 8/31/2024 2011 by Ammy Toro RN  Glycemic Management: blood glucose monitored  Medication Review/Management: medications reviewed  Goal: Blood Pressure in Desired Range  Intervention: Maintain Blood Pressure Management  Recent Flowsheet Documentation  Taken 8/31/2024 2011 by Ammy Toro RN  Medication Review/Management: medications reviewed     Problem: Risk for Delirium  Goal: Optimal Coping  Intervention: Optimize Psychosocial Adjustment to Delirium  Recent Flowsheet Documentation  Taken 8/31/2024 2011 by Ammy Toro RN  Supportive Measures: active listening utilized  Goal: Improved Attention and Thought Clarity  Intervention: Maximize Cognitive Function  Recent Flowsheet Documentation  Taken 8/31/2024 2011 by Ammy Toro, RN  Sensory Stimulation  Regulation:   care clustered   quiet environment promoted  Reorientation Measures: clock in view     Problem: Skin Injury Risk Increased  Goal: Skin Health and Integrity  Intervention: Plan: Nurse Driven Intervention: Moisture Management  Recent Flowsheet Documentation  Taken 8/31/2024 2011 by Ammy Toro, RN  Moisture Interventions: Barrier ointment (CriticAid, Triad paste)  Bathing/Skin Care:   incontinence care   wipes, CHG  Plan: Moisture Management: urinary collection device  Intervention: Plan: Nurse Driven Intervention: Friction and Shear  Recent Flowsheet Documentation  Taken 8/31/2024 2011 by Ammy Toro, RN  Friction/Shear Interventions: HOB 30 degrees or less  Intervention: Optimize Skin Protection  Recent Flowsheet Documentation  Taken 8/31/2024 2011 by Ammy Toro, RN  Activity Management:   activity adjusted per tolerance   activity encouraged  Head of Bed (HOB) Positioning: HOB at 15 degrees  Intervention: Promote and Optimize Oral Intake  Recent Flowsheet Documentation  Taken 8/31/2024 2011 by Ammy Toro, RN  Oral Nutrition Promotion: rest periods promoted     Problem: Mobility Impairment  Goal: Optimal Mobility  Intervention: Optimize Mobility  Recent Flowsheet Documentation  Taken 8/31/2024 2011 by Ammy Toro, RN  Activity Management:   activity adjusted per tolerance   activity encouraged  Positioning/Transfer Devices:   pillows   in use   Goal Outcome Evaluation:

## 2024-09-01 NOTE — PLAN OF CARE
"  Problem: Adult Inpatient Plan of Care  Goal: Plan of Care Review  Description: The Plan of Care Review/Shift note should be completed every shift.  The Outcome Evaluation is a brief statement about your assessment that the patient is improving, declining, or no change.  This information will be displayed automatically on your shift  note.  9/1/2024 1839 by Mala Caballero RN  Outcome: Progressing  9/1/2024 1310 by Mala Caballero RN  Outcome: Progressing  Goal: Patient-Specific Goal (Individualized)  Description: You can add care plan individualizations to a care plan. Examples of Individualization might be:  \"Parent requests to be called daily at 9am for status\", \"I have a hard time hearing out of my right ear\", or \"Do not touch me to wake me up as it startles  me\".  9/1/2024 1839 by Mala Caballero RN  Outcome: Progressing  9/1/2024 1310 by Mala Caballero RN  Outcome: Progressing  Goal: Absence of Hospital-Acquired Illness or Injury  9/1/2024 1839 by Mala Caballero RN  Outcome: Progressing  9/1/2024 1310 by Mala Caballero RN  Outcome: Progressing  Intervention: Identify and Manage Fall Risk  Recent Flowsheet Documentation  Taken 9/1/2024 0800 by Mala Caballero RN  Safety Promotion/Fall Prevention:   activity supervised   assistive device/personal items within reach  Intervention: Prevent Infection  Recent Flowsheet Documentation  Taken 9/1/2024 0800 by Mala Caballero RN  Infection Prevention: hand hygiene promoted  Goal: Optimal Comfort and Wellbeing  9/1/2024 1839 by Mala Caballero RN  Outcome: Progressing  9/1/2024 1310 by Mala Caballero RN  Outcome: Progressing  Intervention: Provide Person-Centered Care  Recent Flowsheet Documentation  Taken 9/1/2024 0800 by Mala Caballero RN  Trust Relationship/Rapport:   care explained   choices provided  Goal: Readiness for Transition of Care  9/1/2024 1839 by Mala Caballero RN  Outcome: Progressing  9/1/2024 1310 by Mala Caballero RN  Outcome: Progressing     Problem: Gas Exchange Impaired  Goal: " Optimal Gas Exchange  9/1/2024 1839 by Mala Caballero RN  Outcome: Progressing  9/1/2024 1310 by Mala Caballero RN  Outcome: Progressing     Problem: Dysrhythmia  Goal: Normalized Cardiac Rhythm  9/1/2024 1839 by Mala Caballero RN  Outcome: Progressing  9/1/2024 1310 by Mala Caballero RN  Outcome: Progressing     Problem: Comorbidity Management  Goal: Maintenance of COPD Symptom Control  9/1/2024 1839 by Mala Caballero RN  Outcome: Progressing  9/1/2024 1310 by Mala Caballero RN  Outcome: Progressing  Goal: Blood Glucose Levels Within Targeted Range  9/1/2024 1839 by Mala Caballero RN  Outcome: Progressing  9/1/2024 1310 by Mala Caballero RN  Outcome: Progressing  Goal: Blood Pressure in Desired Range  9/1/2024 1839 by Mala Caballero RN  Outcome: Progressing  9/1/2024 1310 by Mala Caballero RN  Outcome: Progressing     Problem: Risk for Delirium  Goal: Optimal Coping  9/1/2024 1839 by Mala Caballero RN  Outcome: Progressing  9/1/2024 1310 by Mala Caballero RN  Outcome: Progressing  Goal: Improved Behavioral Control  9/1/2024 1839 by Mala Caballero RN  Outcome: Progressing  9/1/2024 1310 by Mala Caballero RN  Outcome: Progressing  Intervention: Minimize Safety Risk  Recent Flowsheet Documentation  Taken 9/1/2024 0800 by Mala Caballero RN  Communication Enhancement Strategies:   call light answered in person   communication board used  Trust Relationship/Rapport:   care explained   choices provided  Goal: Improved Attention and Thought Clarity  9/1/2024 1839 by Mala Caballero RN  Outcome: Progressing  9/1/2024 1310 by Mala Caballero RN  Outcome: Progressing  Intervention: Maximize Cognitive Function  Recent Flowsheet Documentation  Taken 9/1/2024 0800 by Mala Caballero RN  Reorientation Measures: clock in view  Goal: Improved Sleep  9/1/2024 1839 by Mala Caballero RN  Outcome: Progressing  9/1/2024 1310 by Mala Caballero RN  Outcome: Progressing     Problem: Respiratory Compromise (Pneumothorax)  Goal: Optimal Oxygenation and Ventilation  9/1/2024 1839 by  Caballero, Mala, RN  Outcome: Progressing  9/1/2024 1310 by Mala Caballero RN  Outcome: Progressing  Intervention: Manage Pneumothorax Effects  Recent Flowsheet Documentation  Taken 9/1/2024 1646 by Mala Caballero RN  Administration (IS): instruction provided, follow-up  Level Incentive Spirometer (mL): 500  Number of Repetitions (IS): 10  Patient Tolerance (IS): good  Taken 9/1/2024 0800 by Mala Caballero RN  Administration (IS): instruction provided, follow-up  Level Incentive Spirometer (mL): 250  Number of Repetitions (IS): 10  Patient Tolerance (IS): fair     Problem: Skin Injury Risk Increased  Goal: Skin Health and Integrity  9/1/2024 1839 by Mala Caballero RN  Outcome: Progressing  9/1/2024 1310 by Mala Caballero RN  Outcome: Progressing  Intervention: Plan: Nurse Driven Intervention: Moisture Management  Recent Flowsheet Documentation  Taken 9/1/2024 0800 by Mala Caballero RN  Moisture Interventions: Encourage regular toileting  Intervention: Plan: Nurse Driven Intervention: Friction and Shear  Recent Flowsheet Documentation  Taken 9/1/2024 0800 by Mala Caballero RN  Friction/Shear Interventions: HOB 30 degrees or less  Intervention: Optimize Skin Protection  Recent Flowsheet Documentation  Taken 9/1/2024 0800 by Mala Caballero RN  Activity Management:   activity adjusted per tolerance   activity encouraged     Problem: Mobility Impairment  Goal: Optimal Mobility  9/1/2024 1839 by Mala Caballero RN  Outcome: Progressing  9/1/2024 1310 by Mala Caballero RN  Outcome: Progressing  Intervention: Optimize Mobility  Recent Flowsheet Documentation  Taken 9/1/2024 0800 by Mala Caballero RN  Activity Management:   activity adjusted per tolerance   activity encouraged     Problem: Fluid Volume Excess  Goal: Fluid Balance  9/1/2024 1839 by Mala Caballero RN  Outcome: Progressing  9/1/2024 1310 by Mala Caballero RN  Outcome: Progressing   Goal Outcome Evaluation:         Pt switched from HFNC to 4L NC around 1600. Pt denies SOB and states she is  comfortable. Tolerating well. Sats mid 90s even when fell asleep. LS clear, diminished. Pt declined sitting on the edge of bed and stated she was too tired. Low urine output on day shift, 150 out of Pure wick but had incontinence and pure wick leaked x1 aslo. Pt declined lunch. Ate well for dinner but needs reminders/encouragement to drink. On FR 1800 but only intake about 750.

## 2024-09-01 NOTE — PROGRESS NOTES
"Imp/plan:  Afib -rate control dig/metoprolol and elquis for CVA prevention  Aortic valve s/pTAVR no AI on exam and no MR on exam  S/p PPM - last interrogation 46% AP  Dyspnea s/p thoracentesis complicated by lung empyema in the recent past, risk for HFpEF as well but concern for low filling pressures recently on echo.   Will sig off  Review of Systems: 12 points negative other than above    /60 (BP Location: Left arm)   Pulse 88   Temp 97.7  F (36.5  C) (Oral)   Resp 18   Wt 70.7 kg (155 lb 13.8 oz)   LMP  (LMP Unknown)   SpO2 92%   BMI 25.94 kg/m    JVP<7cm, carotids normal  Lungs clear  Cor RRR no c,r,m  Abs soft +BS, no mass  Ext no c,c,e    Lab Results   Component Value Date    HGB 8.0 (L) 09/01/2024     Lab Results   Component Value Date     (L) 09/01/2024     No results found for: \"CREATININE\"  No components found for: \"K\"          Sukumar Swanson MD  Interventional Cardiology   [facility]  [phone]    "

## 2024-09-01 NOTE — PROGRESS NOTES
Pulmonary Progress Note  8/23/2024      Admit Date: 8/17/2024  CODE: Full Code    Reason for Consult: acute on chronic respiratory failure with hypoxemia, right empyema and pneumonia.    Assessment/Plan:   74F w/ active tobacco dependence, emphysema and presumed COPD with no formal PFTs, chronic hypoxemic resp failure on home O2, DM, fibromyalgia, moderate aortic stenosis, HFpEF, presented with large right sided effusion and right sided mucus plugging with Strep pneumo and E coli pneumonia, did not drain well with chest tube and intrapleural lytics, with persistent opacification of the right hemithorax, transferred to [facility] and underwent right VATS with chest tube placement on 8/19. Our service had signed off but overnight she became much more hypoxic. It is noted that her furosemide had been held one day and she had some improvement in her oxygenation with lasix. Remains very weak and deconditioned. CT Chest from 8/31 demonstrates ongoing stable right pleural space but with a left sided pleural effusion and evidence of volume overload.  Underwent a therapeutic thoracentesis which is consistent with a transudative effusion.  No growth on sputum cultures yet.    Plan:  - Cardiology has been re-involved to assist with volume management. Will undergo TTE today and potentially re initiation of spironolactone.  - Continue mucomyst and metanebs QID with as needed albuterol.  -Presently on ceftriaxone which has been deemed to be adequate per infectious diseases.  Will defer to them for duration of therapy.  - encourage OOB, PT/OT, push IS  - Some improvement in oxygenation.  - continue PTA ICS/LABA (breo in place of symbicort)  - abx per ID  - continue diuresis per [facility] and cardiology  - has follow up in our pulmonary clinic on 9/25. We will arrange for a CT chest to be done prior to that visit.     Charlotte Paredes MD  Pulmonary and Critical Care  [employer]    Subjective/Interim Events:   Feels very weak and  tired.    Medications:     Current Facility-Administered Medications   Medication Dose Route Frequency Provider Last Rate Last Admin     Current Facility-Administered Medications   Medication Dose Route Frequency Provider Last Rate Last Admin    acetylcysteine (MUCOMYST) 20 % nebulizer solution 2 mL  2 mL Nebulization 4x Daily Donte uDtta DO   2 mL at 09/01/24 0852    apixaban ANTICOAGULANT (ELIQUIS) tablet 5 mg  5 mg Oral BID Lauren Willson MD   5 mg at 09/01/24 0922    artificial saliva (BIOTENE MT) solution 1 spray  1 spray Mouth/Throat 4x Daily Renée Soriano MD   1 spray at 08/31/24 1224    atorvastatin (LIPITOR) tablet 20 mg  20 mg Oral At Bedtime Renée Soriano MD   20 mg at 08/31/24 2058    cefTRIAXone (ROCEPHIN) 2 g vial to attach to  ml bag for ADULTS or NS 50 ml bag for PEDS  2 g Intravenous Q24H Ravindra Bacon  mL/hr at 08/31/24 1213 2 g at 08/31/24 1213    digoxin (LANOXIN) tablet 125 mcg  125 mcg Oral Daily Renée Soriano MD   125 mcg at 09/01/24 0921    diltiazem ER COATED BEADS (CARDIZEM CD/CARTIA XT) 24 hr capsule 120 mg  120 mg Oral Daily Renée Soriano MD   120 mg at 08/31/24 1213    fluticasone-vilanterol (BREO ELLIPTA) 200-25 MCG/ACT inhaler 1 puff  1 puff Inhalation Daily Renée Soriano MD   1 puff at 09/01/24 0921    [Held by provider] furosemide (LASIX) injection 20 mg  20 mg Intravenous Q12H Donte Dutta DO   20 mg at 09/01/24 0446    gabapentin (NEURONTIN) capsule 600 mg  600 mg Oral TID Renée Soriano MD   600 mg at 09/01/24 0922    insulin aspart (NovoLOG) injection (RAPID ACTING)  1-10 Units Subcutaneous TID AC Renée Soriano MD   5 Units at 08/31/24 1909    insulin aspart (NovoLOG) injection (RAPID ACTING)  1-7 Units Subcutaneous At Bedtime Renée Soriano MD   1 Units at 08/28/24 2133    levalbuterol (XOPENEX) neb solution 1.25 mg  1.25 mg Nebulization 4x Daily Aamir Ross RT   1.25 mg at 09/01/24 0852    And    ipratropium (ATROVENT) 0.02 %  neb solution 0.5 mg  0.5 mg Nebulization 4x daily Aamir Ross G, RT   0.5 mg at 09/01/24 0852    Lidocaine (LIDOCARE) 4 % Patch 1 patch  1 patch Transdermal Q24h Antonia Baptiste MD   1 patch at 08/31/24 2054    metoprolol tartrate (LOPRESSOR) tablet 50 mg  50 mg Oral BID Renée Soriano MD   50 mg at 09/01/24 0921    pantoprazole (PROTONIX) EC tablet 40 mg  40 mg Oral QAM AC Renée Soriano MD   40 mg at 09/01/24 0647    polyethylene glycol (MIRALAX) Packet 17 g  17 g Oral Daily Renée Soriano MD   17 g at 08/28/24 0803    senna-docusate (SENOKOT-S/PERICOLACE) 8.6-50 MG per tablet 1 tablet  1 tablet Oral BID Renée Soriano MD   1 tablet at 08/31/24 2058    sodium chloride (NEBUSAL) 3 % neb solution 3 mL  3 mL Nebulization 4x daily Carmelita Buckley MD   3 mL at 09/01/24 0852    sodium chloride (PF) 0.9% PF flush 10 mL  10 mL Intracatheter Q8H PonLauren robles MD   10 mL at 09/01/24 0923    sodium chloride (PF) 0.9% PF flush 10-40 mL  10-40 mL Intracatheter Q7 Days Renée Soriano MD   30 mL at 08/31/24 1754    sterile talc (STERITALC) powder for sclerosing 4 g  4 g INTRAPLEURAL Once Renée Soriano MD        sucralfate (CARAFATE) tablet 1 g  1 g Oral TID AC Renée Soriano MD   1 g at 09/01/24 0647         Exam/Data:   Vitals  /60 (BP Location: Left arm)   Pulse 88   Temp 97.7  F (36.5  C) (Oral)   Resp 18   Wt 70.7 kg (155 lb 13.8 oz)   LMP  (LMP Unknown)   SpO2 92%   BMI 25.94 kg/m    BP - Mean:  [76] 76  I/O last 3 completed shifts:  In: -   Out: 650 [Urine:650]  Weight change:   [unfilled]  FiO2 (%): 40 %, Resp: 18    EXAM:  Physical Exam  Constitutional:       Comments: Very weak appearing.   HENT:      Head: Normocephalic.      Mouth/Throat:      Mouth: Mucous membranes are dry.   Cardiovascular:      Rate and Rhythm: Normal rate and regular rhythm.      Heart sounds: Normal heart sounds.   Pulmonary:      Effort: Pulmonary effort is normal.      Breath sounds: Normal breath sounds.   Abdominal:       General: Abdomen is flat.      Palpations: Abdomen is soft.   Skin:     General: Skin is warm.   Neurological:      General: No focal deficit present.      Mental Status: She is alert and oriented to person, place, and time.         ROS:  A 10-system review was obtained and is negative with the exception of the symptoms noted above.    DATA:    PFT DATA   None available    Micro  PCT wnl on 8/10  Viral swabs neg    Bronch/BAL 8/11  Culture + for e. Coli, pan-sensitive  1+ yeast, candida albicans  Total nuc cells 22, 65% PMN, 9% lymphs, 20% lining cells  Cytology neg for malignancy     Pleural fluid 8/11  Culture + for strep pneumo, pan-sensitive  Total nuc cells 1451  73% PMN, 22% lymphs, 5% mono/mac  Glucose 159    Protein 3.3  TG 19  Cyto from 8/19 neg for malignancy  PJP pCR neg     Sputum 8/12  2+ strep pneumo  1+ e. Coli, pan-sensitive    IMAGING:   XR Chest Port 1 View    Result Date: 8/18/2024  EXAM: XR CHEST PORT 1 VIEW LOCATION: [facility] DATE: 8/18/2024 INDICATION: Recheck chest tube and pleural effusions COMPARISON: 8/17/2024     IMPRESSION: No change since yesterday. Small caliber chest tube projected at the right lung base unchanged. Complete opacification of the right hemithorax with volume loss unchanged. Right PICC line tip in low SVC. Transvenous pacemaker. Hyperinflation left lung.    POC US Chest B-Scan    Limited Bedside Lung Ultrasound, performed and interpreted by me. Indication: empyema and dyspnea With the patient positioned supine, right posterior and lateral lung fields were examined for evidence of thoracic free fluid, pulmonary consolidation, and pulmonary edema. Findings: moderate right pleural effusion noted, mostly free flowing. Some debris noted (plankton sign) Chest tube visualized in the pleural effusion. Right lung atelectasis noted. No obvious loculations although scanning was limited due to presence of dressing. IMPRESSION: moderate to large  right pleural effusion with chest tube in place. No obvious loculations noted on this limited bedside pleural/lung POCUS.      XR Chest Port 1 View    Result Date: 8/17/2024  EXAM: XR CHEST PORT 1 VIEW LOCATION: [facility] DATE: 8/17/2024 INDICATION: Recheck chest tube and pleural effusions COMPARISON: CT chest without contrast 08/16/2024, chest radiograph 08/15/2024     IMPRESSION: Stable position of the right basilar pigtail chest tube. Stable complete opacification of the right hemithorax with rib crowding compatible with large pleural effusion and associated collapse of the right lung. Right upper extremity PICC line  with tip overlying the mid aspect of the SVC. Cardiomediastinal silhouette is partially obscured by the above process however appears grossly stable. Aortic valve replacement. Stable position of the left chest pacemaker. Trace left pleural effusion. Streaky opacities at the left lung base favoring atelectasis. No pneumothorax. Unchanged osseous structures.

## 2024-09-02 LAB
ANION GAP SERPL CALCULATED.3IONS-SCNC: 2 MMOL/L (ref 7–15)
BUN SERPL-MCNC: 10.2 MG/DL (ref 8–23)
CALCIUM SERPL-MCNC: 8.5 MG/DL (ref 8.8–10.4)
CHLORIDE SERPL-SCNC: 96 MMOL/L (ref 98–107)
CREAT SERPL-MCNC: 0.52 MG/DL (ref 0.51–0.95)
EGFRCR SERPLBLD CKD-EPI 2021: >90 ML/MIN/1.73M2
ERYTHROCYTE [DISTWIDTH] IN BLOOD BY AUTOMATED COUNT: 20.6 % (ref 10–15)
GLUCOSE BLDC GLUCOMTR-MCNC: 112 MG/DL (ref 70–99)
GLUCOSE BLDC GLUCOMTR-MCNC: 114 MG/DL (ref 70–99)
GLUCOSE BLDC GLUCOMTR-MCNC: 189 MG/DL (ref 70–99)
GLUCOSE BLDC GLUCOMTR-MCNC: 195 MG/DL (ref 70–99)
GLUCOSE BLDC GLUCOMTR-MCNC: 250 MG/DL (ref 70–99)
GLUCOSE SERPL-MCNC: 117 MG/DL (ref 70–99)
HCO3 SERPL-SCNC: 43 MMOL/L (ref 22–29)
HCT VFR BLD AUTO: 28 % (ref 35–47)
HGB BLD-MCNC: 7.8 G/DL (ref 11.7–15.7)
MAGNESIUM SERPL-MCNC: 1.8 MG/DL (ref 1.7–2.3)
MCH RBC QN AUTO: 24.7 PG (ref 26.5–33)
MCHC RBC AUTO-ENTMCNC: 27.9 G/DL (ref 31.5–36.5)
MCV RBC AUTO: 89 FL (ref 78–100)
PLATELET # BLD AUTO: 127 10E3/UL (ref 150–450)
POTASSIUM SERPL-SCNC: 4.1 MMOL/L (ref 3.4–5.3)
RBC # BLD AUTO: 3.16 10E6/UL (ref 3.8–5.2)
SODIUM SERPL-SCNC: 141 MMOL/L (ref 135–145)
WBC # BLD AUTO: 2.8 10E3/UL (ref 4–11)

## 2024-09-02 PROCEDURE — 83735 ASSAY OF MAGNESIUM: CPT | Performed by: INTERNAL MEDICINE

## 2024-09-02 PROCEDURE — 85014 HEMATOCRIT: CPT | Performed by: INTERNAL MEDICINE

## 2024-09-02 PROCEDURE — 99233 SBSQ HOSP IP/OBS HIGH 50: CPT | Performed by: INTERNAL MEDICINE

## 2024-09-02 PROCEDURE — 80048 BASIC METABOLIC PNL TOTAL CA: CPT | Performed by: SPECIALIST

## 2024-09-02 PROCEDURE — 250N000013 HC RX MED GY IP 250 OP 250 PS 637: Performed by: SPECIALIST

## 2024-09-02 PROCEDURE — 250N000013 HC RX MED GY IP 250 OP 250 PS 637: Performed by: FAMILY MEDICINE

## 2024-09-02 PROCEDURE — 94640 AIRWAY INHALATION TREATMENT: CPT

## 2024-09-02 PROCEDURE — 94640 AIRWAY INHALATION TREATMENT: CPT | Mod: 76

## 2024-09-02 PROCEDURE — 250N000009 HC RX 250

## 2024-09-02 PROCEDURE — 99232 SBSQ HOSP IP/OBS MODERATE 35: CPT | Performed by: INTERNAL MEDICINE

## 2024-09-02 PROCEDURE — 120N000001 HC R&B MED SURG/OB

## 2024-09-02 PROCEDURE — 250N000013 HC RX MED GY IP 250 OP 250 PS 637: Performed by: STUDENT IN AN ORGANIZED HEALTH CARE EDUCATION/TRAINING PROGRAM

## 2024-09-02 PROCEDURE — 94799 UNLISTED PULMONARY SVC/PX: CPT

## 2024-09-02 PROCEDURE — 250N000009 HC RX 250: Performed by: INTERNAL MEDICINE

## 2024-09-02 PROCEDURE — 999N000157 HC STATISTIC RCP TIME EA 10 MIN

## 2024-09-02 PROCEDURE — 250N000011 HC RX IP 250 OP 636: Performed by: INTERNAL MEDICINE

## 2024-09-02 RX ADMIN — SODIUM CHLORIDE SOLN NEBU 3% 3 ML: 3 NEBU SOLN at 17:33

## 2024-09-02 RX ADMIN — GABAPENTIN 600 MG: 300 CAPSULE ORAL at 08:04

## 2024-09-02 RX ADMIN — CEFTRIAXONE SODIUM 2 G: 2 INJECTION, POWDER, FOR SOLUTION INTRAMUSCULAR; INTRAVENOUS at 12:27

## 2024-09-02 RX ADMIN — IPRATROPIUM BROMIDE 0.5 MG: 0.5 SOLUTION RESPIRATORY (INHALATION) at 09:40

## 2024-09-02 RX ADMIN — SODIUM CHLORIDE SOLN NEBU 3% 3 ML: 3 NEBU SOLN at 20:24

## 2024-09-02 RX ADMIN — ACETYLCYSTEINE 2 ML: 200 SOLUTION ORAL; RESPIRATORY (INHALATION) at 09:39

## 2024-09-02 RX ADMIN — SENNOSIDES AND DOCUSATE SODIUM 1 TABLET: 8.6; 5 TABLET ORAL at 20:49

## 2024-09-02 RX ADMIN — PANTOPRAZOLE SODIUM 40 MG: 40 TABLET, DELAYED RELEASE ORAL at 06:03

## 2024-09-02 RX ADMIN — ACETAMINOPHEN 650 MG: 325 TABLET, FILM COATED ORAL at 06:07

## 2024-09-02 RX ADMIN — ACETYLCYSTEINE 2 ML: 200 SOLUTION ORAL; RESPIRATORY (INHALATION) at 20:10

## 2024-09-02 RX ADMIN — Medication 1 SPRAY: at 20:57

## 2024-09-02 RX ADMIN — SUCRALFATE 1 G: 1 TABLET ORAL at 06:03

## 2024-09-02 RX ADMIN — DIGOXIN 125 MCG: 125 TABLET ORAL at 08:11

## 2024-09-02 RX ADMIN — APIXABAN 5 MG: 5 TABLET, FILM COATED ORAL at 20:50

## 2024-09-02 RX ADMIN — ACETYLCYSTEINE 2 ML: 200 SOLUTION ORAL; RESPIRATORY (INHALATION) at 17:33

## 2024-09-02 RX ADMIN — INSULIN ASPART 3 UNITS: 100 INJECTION, SOLUTION INTRAVENOUS; SUBCUTANEOUS at 20:55

## 2024-09-02 RX ADMIN — SUCRALFATE 1 G: 1 TABLET ORAL at 17:11

## 2024-09-02 RX ADMIN — ACETYLCYSTEINE 2 ML: 200 SOLUTION ORAL; RESPIRATORY (INHALATION) at 13:35

## 2024-09-02 RX ADMIN — LIDOCAINE 1 PATCH: 4 PATCH TOPICAL at 20:47

## 2024-09-02 RX ADMIN — IPRATROPIUM BROMIDE 0.5 MG: 0.5 SOLUTION RESPIRATORY (INHALATION) at 13:34

## 2024-09-02 RX ADMIN — ATORVASTATIN CALCIUM 20 MG: 10 TABLET, FILM COATED ORAL at 20:50

## 2024-09-02 RX ADMIN — FLUTICASONE FUROATE AND VILANTEROL TRIFENATATE 1 PUFF: 200; 25 POWDER RESPIRATORY (INHALATION) at 08:04

## 2024-09-02 RX ADMIN — LEVALBUTEROL HYDROCHLORIDE 1.25 MG: 1.25 SOLUTION RESPIRATORY (INHALATION) at 13:34

## 2024-09-02 RX ADMIN — SUCRALFATE 1 G: 1 TABLET ORAL at 12:28

## 2024-09-02 RX ADMIN — SODIUM CHLORIDE SOLN NEBU 3% 3 ML: 3 NEBU SOLN at 09:41

## 2024-09-02 RX ADMIN — IPRATROPIUM BROMIDE 0.5 MG: 0.5 SOLUTION RESPIRATORY (INHALATION) at 17:31

## 2024-09-02 RX ADMIN — IPRATROPIUM BROMIDE 0.5 MG: 0.5 SOLUTION RESPIRATORY (INHALATION) at 20:09

## 2024-09-02 RX ADMIN — SODIUM CHLORIDE SOLN NEBU 3% 3 ML: 3 NEBU SOLN at 13:34

## 2024-09-02 RX ADMIN — GABAPENTIN 600 MG: 300 CAPSULE ORAL at 13:41

## 2024-09-02 RX ADMIN — APIXABAN 5 MG: 5 TABLET, FILM COATED ORAL at 08:04

## 2024-09-02 RX ADMIN — LEVALBUTEROL HYDROCHLORIDE 1.25 MG: 1.25 SOLUTION RESPIRATORY (INHALATION) at 17:31

## 2024-09-02 RX ADMIN — METOPROLOL TARTRATE 50 MG: 25 TABLET, FILM COATED ORAL at 20:50

## 2024-09-02 RX ADMIN — LEVALBUTEROL HYDROCHLORIDE 1.25 MG: 1.25 SOLUTION RESPIRATORY (INHALATION) at 09:40

## 2024-09-02 RX ADMIN — GABAPENTIN 600 MG: 300 CAPSULE ORAL at 20:49

## 2024-09-02 RX ADMIN — LEVALBUTEROL HYDROCHLORIDE 1.25 MG: 1.25 SOLUTION RESPIRATORY (INHALATION) at 20:09

## 2024-09-02 ASSESSMENT — ACTIVITIES OF DAILY LIVING (ADL)
ADLS_ACUITY_SCORE: 44
ADLS_ACUITY_SCORE: 45
ADLS_ACUITY_SCORE: 45
ADLS_ACUITY_SCORE: 44
ADLS_ACUITY_SCORE: 45
ADLS_ACUITY_SCORE: 44
ADLS_ACUITY_SCORE: 45
ADLS_ACUITY_SCORE: 45
ADLS_ACUITY_SCORE: 44
ADLS_ACUITY_SCORE: 44
ADLS_ACUITY_SCORE: 45
ADLS_ACUITY_SCORE: 44
ADLS_ACUITY_SCORE: 45
ADLS_ACUITY_SCORE: 44
ADLS_ACUITY_SCORE: 45
ADLS_ACUITY_SCORE: 45
ADLS_ACUITY_SCORE: 44

## 2024-09-02 NOTE — PLAN OF CARE
Problem: Adult Inpatient Plan of Care  Goal: Optimal Comfort and Wellbeing  Outcome: Progressing  Intervention: Monitor Pain and Promote Comfort  Recent Flowsheet Documentation  Taken 9/2/2024 0607 by Sanam Angeles RN  Pain Management Interventions: medication (see MAR)     Problem: Gas Exchange Impaired  Goal: Optimal Gas Exchange  Intervention: Optimize Oxygenation and Ventilation  Recent Flowsheet Documentation  Taken 9/2/2024 0200 by Sanam Angeles RN  Head of Bed (HOB) Positioning: HOB at 20 degrees   Goal Outcome Evaluation:  Pt doing much better this shift.  Sleeping soundly for the 1st 4 hours of this shift.  No respiratory distress.  On 4 liters oxygen NC with sats in the upper 90's.  Oxygen decreased to 3 liters and pt continued to maintain sats even with activity in bed.  Purewick in place and changed.

## 2024-09-02 NOTE — PLAN OF CARE
Problem: Adult Inpatient Plan of Care  Goal: Optimal Comfort and Wellbeing  Intervention: Provide Person-Centered Care  Recent Flowsheet Documentation  Taken 9/2/2024 0820 by Kizzy Lazar RN  Trust Relationship/Rapport:   care explained   choices provided   emotional support provided     Problem: Gas Exchange Impaired  Goal: Optimal Gas Exchange  Outcome: Progressing  Intervention: Optimize Oxygenation and Ventilation  Recent Flowsheet Documentation  Taken 9/2/2024 0820 by Kizzy Lazar RN  Head of Bed (HOB) Positioning: HOB at 20 degrees     Problem: Comorbidity Management  Goal: Blood Glucose Levels Within Targeted Range  Outcome: Progressing  Intervention: Monitor and Manage Glycemia  Recent Flowsheet Documentation  Taken 9/2/2024 0820 by Kizzy Lazar, RN  Glycemic Management: blood glucose monitored  Medication Review/Management: medications reviewed     Problem: Mobility Impairment  Goal: Optimal Mobility  Outcome: Progressing  Intervention: Optimize Mobility  Recent Flowsheet Documentation  Taken 9/2/2024 0820 by Kizzy Lazar, RN  Activity Management: activity adjusted per tolerance  Positioning/Transfer Devices:   pillows   in use   Goal Outcome Evaluation:         Patient tolerating o2 at 3L per nasal canula and o2 saturation 95%. Sat on edge of bed for 1 hour and tolerated well. Denies pain this am. Continue to assess pain and medicate as needed.

## 2024-09-02 NOTE — PROGRESS NOTES
PULMONARY / CRITICAL CARE PROGRESS NOTE    Date / Time of Admission:  8/17/2024  1:56 PM    Assessment:     Mary Kay Tejada is a 74 year old female with history of HTN, CAD, HFpEF, afib on DOAC, aortic stenosis s/p TAVR, s/p pacemaker, COPD, tobacco user.   Hospitalized from 7/12 to 7/17 with acute hypercarbic respiratory failure, pneumonia and COVI19 viral infection. CT scan showed RUL collapse, right effusion. Sputum Cx (+) E coli. Developed pneumothorax post right side thoracentesis. Discharged home on O2 and Abx.  Presents to  ED on 8/10/2024 for evaluation of shortness of breath. CXR showed large right pleural effusion. Chest tube was placed and reposition the following day. Diagnostic bronchoscopy 8/11 showed no endobronchial lesions. Pleural fluid (+) strep pneumonia. Follow up chest CT scan showed loculated effusion, started on intrapleural lytics. Follow up CXR showed large right effusion. Transferred to [facility] for surgical treatment. S/p VATS and decortication on 8/19.   Full expansion of right lung in follow up images. Chest tube was removed on 8/25.   Patient had increased O2 requirements, clinically volume up, X ray showed pulmonary congestion and bilateral pleural effusions. High proBNP. Therapeutic left side thoracentesis, transudate effusion. Diuresis was titrated up, improvement of O2 requirements.     Acute respiratory failure   Empyema  S/p pig tail chest tube 8/10 and reposition on 8/11 with resolution of pleural effusion.   Pleural fluid grew Strep pneumonia.   Follow up CT scan 8/13 showed re accumulation of pleural effusion, loculated effusion.   Started on intrapleural lytics. Follow up CXR 8/15 showed large right effusion with total opacification of right hemithorax.   Surgery consulted. S/p VATS and decortication on 8/19.   Removal of chest tube on 8/25.    Abx per ID, currently on ceftriaxone.   Sp treatment of pneumonia   CT scan showed debris in airway. Chronic collapse  right upper lobe. Atelectasis RLL.   S/p diagnostic bronchoscopy 8/11, oral candidiasis, hyperemic mucosa, and mild thick secretions, no endobronchial lesions. BAL RUL and RLL were done.  BAL grew E coli. Sputum Cx grew E coli and strep pneumonia.   Pulmonary toiletting.   Diet per speech therapy.   Decompensated cardiomyopathy   Titrate BP meds.   Follow up BNP level , CXR in AM.   Consider to resume diuresis   Transudate left pleural effusion   S/p therapeutic thoracentesis 8/31. Removal of 0.8 L   COPD  Continue schedule bronchodilators. ICS/LABA.   A fib / flutter with RVR  Cardiology is following. Off amiodarone due to prolonged QT interval.   On digoxin, metoprolol and diltiazem  Patient is anticoagulated.   HTN, CAD, HFpEF, aortic stenosis s/p TAVR, s/p pacemaker  Tobacco user  Deconditioning      Advance Directives:  Full code     Plan:     Titrate FiO2 to keep SpO2 > 90%, currently on nasal cannula 3 LPM  Schedule bronchodilators  Abx ceftriaxone   Titrate BP meds  Follow up BNP level   CXR in AM  Monitor kidney function   Supplement electrolytes as needed  Diet per speech therapy   Glucose level monitoring   DVT prophylaxis , apixaban   PT, OT evaluation     Please contact me if you have any questions.    Modesto Joseph  Pulmonary / Critical Care  09/02/2024  8:57 AM      Subjective:   HPI:  Mary Kay Tejada is a 74 year old female with history of HTN, CAD, HFpEF, afib on DOAC, aortic stenosis s/p TAVR, s/p pacemaker, COPD, tobacco user.   Hospitalized from 7/12 to 7/17 with acute hypercarbic respiratory failure, pneumonia and COVI19 viral infection. CT scan showed RUL collapse, right effusion. Sputum Cx (+) E coli. Developed pneumothorax post right side thoracentesis. Discharged home on O2 and Abx.  Presents to  ED on 8/10/2024 for evaluation of shortness of breath. CXR showed large right pleural effusion. Chest tube was placed and reposition the following day. Diagnostic bronchoscopy 8/11  showed no endobronchial lesions. Pleural fluid (+) strep pneumonia. Follow up chest CT scan showed loculated effusion, started on intrapleural lytics. Follow up CXR showed large right effusion. Transferred to [facility] for surgical treatment. S/p VATS and decortication on 8/19.   Full expansion of right lung in follow up images. Chest tube was removed on 8/25.   Patient had increased O2 requirements, clinically volume up, X ray showed pulmonary congestion and bilateral pleural effusions. High proBNP. Diuresis was titrated up, improvement of O2 requirements.     Events overnight  - Improvement of shortness of breath   - Decrease appetite, denies fever, chills or night sweats.   - Working with PT     Allergies: Celebrex [celecoxib] and Latex     MEDS:  Current Facility-Administered Medications   Medication Dose Route Frequency Provider Last Rate Last Admin    acetaminophen (TYLENOL) tablet 650 mg  650 mg Oral Q4H PRN Lauren Willson MD   650 mg at 09/02/24 0607    Or    acetaminophen (TYLENOL) Suppository 650 mg  650 mg Rectal Q4H PRN Lauren Willson MD        acetylcysteine (MUCOMYST) 20 % nebulizer solution 2 mL  2 mL Nebulization 4x Daily Donte Dutta DO   2 mL at 09/01/24 1951    [Held by provider] albuterol (PROVENTIL HFA/VENTOLIN HFA) inhaler  2 puff Inhalation Q4H PRN Angela Kaiser MD        albuterol (PROVENTIL) neb solution 2.5 mg  2.5 mg Nebulization Q4H PRN Charlotte Paredes MD        alum & mag hydroxide-simethicone (MAALOX) suspension 30 mL  30 mL Oral Q4H PRN Lauren Willson MD        apixaban ANTICOAGULANT (ELIQUIS) tablet 5 mg  5 mg Oral BID Lauren Willson MD   5 mg at 09/02/24 0804    artificial saliva (BIOTENE MT) solution 1 spray  1 spray Mouth/Throat 4x Daily Renée Soriano MD   1 spray at 08/31/24 1224    atorvastatin (LIPITOR) tablet 20 mg  20 mg Oral At Bedtime Renée Soriano MD   20 mg at 09/01/24 2103    benzocaine-menthol (CHLORASEPTIC) 6-10 MG lozenge 1 lozenge  1  lozenge Buccal Q1H PRN Renée Soriano MD        bisacodyl (DULCOLAX) suppository 10 mg  10 mg Rectal Daily PRN Renée Soriano MD        calcium carbonate (TUMS) chewable tablet 1,000 mg  1,000 mg Oral 4x Daily PRN Renée Soriano MD        cefTRIAXone (ROCEPHIN) 2 g vial to attach to  ml bag for ADULTS or NS 50 ml bag for PEDS  2 g Intravenous Q24H Ravindra Bacon  mL/hr at 08/31/24 1213 2 g at 09/01/24 1234    glucose gel 15-30 g  15-30 g Oral Q15 Min PRN Renée Soriano MD        Or    dextrose 50 % injection 25-50 mL  25-50 mL Intravenous Q15 Min PRN Renée Soriano MD        Or    glucagon injection 1 mg  1 mg Subcutaneous Q15 Min PRN Renée Soriano MD        digoxin (LANOXIN) tablet 125 mcg  125 mcg Oral Daily Renée Soriano MD   125 mcg at 09/02/24 0811    diltiazem ER COATED BEADS (CARDIZEM CD/CARTIA XT) 24 hr capsule 120 mg  120 mg Oral Daily Renée Soriano MD   120 mg at 08/31/24 1213    fluticasone-vilanterol (BREO ELLIPTA) 200-25 MCG/ACT inhaler 1 puff  1 puff Inhalation Daily Renée Soriano MD   1 puff at 09/02/24 0804    gabapentin (NEURONTIN) capsule 600 mg  600 mg Oral TID Renée Soriano MD   600 mg at 09/02/24 0804    HYDROmorphone (DILAUDID) injection 0.2 mg  0.2 mg Intravenous Q2H PRN Renée Soriano MD   0.2 mg at 08/20/24 0746    Or    HYDROmorphone (DILAUDID) injection 0.4 mg  0.4 mg Intravenous Q2H PRN Renée Soriano MD        insulin aspart (NovoLOG) injection (RAPID ACTING)  1-10 Units Subcutaneous TID AC Renée Soriano MD   3 Units at 09/01/24 1234    insulin aspart (NovoLOG) injection (RAPID ACTING)  1-7 Units Subcutaneous At Bedtime Renée Soriano MD   1 Units at 08/28/24 2133    levalbuterol (XOPENEX) neb solution 1.25 mg  1.25 mg Nebulization 4x Daily Aamir Ross RT   1.25 mg at 09/01/24 1951    And    ipratropium (ATROVENT) 0.02 % neb solution 0.5 mg  0.5 mg Nebulization 4x daily Aamir Ross RT   0.5 mg at 09/01/24 1951    ipratropium - albuterol 0.5  mg/2.5 mg/3 mL (DUONEB) neb solution 3 mL  3 mL Nebulization Q4H PRN Renée Soriano MD        Lidocaine (LIDOCARE) 4 % Patch 1 patch  1 patch Transdermal Q24h Antonia Baptiste MD   1 patch at 09/01/24 2106    lidocaine (LMX4) cream   Topical Q1H PRN Renée Soriano MD        lidocaine 1 % 0.1-1 mL  0.1-1 mL Other Q1H PRN Renée Soriano MD        magnesium hydroxide (MILK OF MAGNESIA) suspension 30 mL  30 mL Oral Daily PRN Renée Soriano MD        melatonin tablet 1 mg  1 mg Oral At Bedtime PRN Renée Soriano MD        menthol-zinc oxide (CALMOSEPTINE) 0.44-20.6 % ointment OINT   Topical 4x Daily PRN Lauren Willson MD   Given at 08/29/24 2317    metoprolol tartrate (LOPRESSOR) tablet 50 mg  50 mg Oral BID Renée Soriano MD   50 mg at 09/01/24 2104    miconazole (MICATIN) 2 % powder   Topical TID PRN Renée Soriano MD        naloxone (NARCAN) injection 0.2 mg  0.2 mg Intravenous Q2 Min PRN Renée Soriano MD        naloxone (NARCAN) injection 0.2 mg  0.2 mg Intramuscular Q2 Min PRN Renée Soriano MD        naloxone (NARCAN) injection 0.4 mg  0.4 mg Intravenous Q2 Min PRN Renée Soriano MD        naloxone (NARCAN) injection 0.4 mg  0.4 mg Intramuscular Q2 Min PRN Renée Soriano MD        ondansetron (ZOFRAN ODT) ODT tab 4 mg  4 mg Oral Q6H PRN Renée Soriano MD   4 mg at 08/25/24 0825    Or    ondansetron (ZOFRAN) injection 4 mg  4 mg Intravenous Q6H PRN Renée Soriano MD   4 mg at 08/25/24 0253    pantoprazole (PROTONIX) EC tablet 40 mg  40 mg Oral QAM AC Renée Soriano MD   40 mg at 09/02/24 0603    polyethylene glycol (MIRALAX) Packet 17 g  17 g Oral Daily Renée Soriano MD   17 g at 08/28/24 0803    polyethylene glycol (MIRALAX) Packet 17 g  17 g Oral Daily PRN Renée Soriano MD   17 g at 08/23/24 0903    prochlorperazine (COMPAZINE) injection 5 mg  5 mg Intravenous Q6H PRN Renée Soriano MD        Or    prochlorperazine (COMPAZINE) tablet 5 mg  5 mg Oral Q6H PRN Renée Soriano MD        Or     prochlorperazine (COMPAZINE) suppository 12.5 mg  12.5 mg Rectal Q12H PRN Renée Soriano MD        senna-docusate (SENOKOT-S/PERICOLACE) 8.6-50 MG per tablet 1 tablet  1 tablet Oral BID Renée Soriano MD   1 tablet at 09/01/24 2105    senna-docusate (SENOKOT-S/PERICOLACE) 8.6-50 MG per tablet 1 tablet  1 tablet Oral BID PRN Renée Soriano MD        Or    senna-docusate (SENOKOT-S/PERICOLACE) 8.6-50 MG per tablet 2 tablet  2 tablet Oral BID PRN Renée Soriano MD        sodium chloride (NEBUSAL) 3 % neb solution 3 mL  3 mL Nebulization 4x daily Carmelita Buckley MD   3 mL at 09/01/24 1951    sodium chloride (PF) 0.9% PF flush 10 mL  10 mL Intracatheter Q8H Lauren Willson MD   10 mL at 09/02/24 0807    sodium chloride (PF) 0.9% PF flush 10-20 mL  10-20 mL Intracatheter q1 min prn Renée Soriano MD   10 mL at 08/21/24 1228    sodium chloride (PF) 0.9% PF flush 10-40 mL  10-40 mL Intracatheter Q7 Days Renée Soriano MD   30 mL at 08/31/24 1754    sodium chloride (PF) 0.9% PF flush 10-40 mL  10-40 mL Intracatheter Q1H PRN Renée Soriano MD   10 mL at 08/21/24 1231    sodium chloride (PF) 0.9% PF flush 3 mL  3 mL Intracatheter q1 min prn Renée Soriano MD        sodium chloride (PF) 0.9% PF flush 3 mL  3 mL Intracatheter q1 min prn Renée Soriano MD        sterile talc (STERITALC) powder for sclerosing 4 g  4 g INTRAPLEURAL Once Renée Soriano MD        sucralfate (CARAFATE) tablet 1 g  1 g Oral TID AC Renée Soriano MD   1 g at 09/02/24 0603    traMADol (ULTRAM) tablet 50 mg  50 mg Oral Q6H PRN Renée Soriano MD   50 mg at 08/29/24 1024     Objective:   VITALS:  /55   Pulse 85   Temp 97.6  F (36.4  C) (Oral)   Resp 18   Wt 66.1 kg (145 lb 11.6 oz)   LMP  (LMP Unknown)   SpO2 97%   BMI 24.25 kg/m    VENT:  FiO2 (%): 38 %, Resp: 18  EXAM:   Gen: awake, alert, no distress  HEENT: pink conjunctiva, moist mucosa, Mallampati II/IV  Neck: no thyromegaly, masses or JVD  Lungs: decrease breath sounds at the  bases, R>L  CV: regular, no murmurs or gallops appreciated  Abdomen: soft, NT, BS wnl  Ext: no edema  Neuro: CN II-XII intact, non focal       Data Review:  Recent Labs   Lab 09/02/24  0751 09/02/24  0600 09/02/24  0241 09/01/24  2114 09/01/24  1639 09/01/24  1219   * 117* 114* 133* 93 201*        09/02/24 06:00   Sodium 141   Potassium 4.1   Chloride 96 (L)   Carbon Dioxide (CO2) 43 (H)   Urea Nitrogen 10.2   Creatinine 0.52   GFR Estimate >90   Calcium 8.5 (L)   Anion Gap 2 (L)   Magnesium 1.8   Glucose 117 (H)      09/02/24 06:00   WBC 2.8 (L)   Hemoglobin 7.8 (L)   Hematocrit 28.0 (L)   Platelet Count 127 (L)   RBC Count 3.16 (L)   MCV 89   MCH 24.7 (L)   MCHC 27.9 (L)   RDW 20.6 (H)      CT CHEST WITHOUT CONTRAST  LOCATION: [facility]  DATE: 08/31/2024  INDICATION: Right lower lobe infiltrate status post VATS, now with increasing oxygen requirements.  COMPARISON: CT chest 08/20/2024.  FINDINGS:   LUNGS AND PLEURA: Moderate left pleural fluid is slightly larger. No left pneumothorax. Interval removal of right chest tube. There is persistent complex small volume right pleural effusion with small pneumothorax gas at the right chest base. The pneumothorax gas is smaller in size in the interval. Some apparent debris at the pleural fluid posteriorly is again suggested on series 2 image 47. Moderate right and left base atelectasis. Small aspirated material within the right main bronchus image 58, though the volume of aspirated material appears decreased in the interval. Emphysematous changes. Mildly increased hazy opacities at the left upper lobe. Mild smooth interstitial thickening bilaterally.  MEDIASTINUM/AXILLAE: Scattered thoracic aortic calcifications without acute abnormality. Stable small mediastinal lymph nodes. No new acute mediastinal abnormality. Left chest pacer. Right PICC line tip at the mid SVC. TAVR.  CORONARY ARTERY CALCIFICATION: Severe.  UPPER ABDOMEN: Stable  hypodense enlargement of the bilateral adrenals. In particular, the left adrenal is 3.4 cm. Low-density suggests adenomas. No acute upper abdominal abnormality.   MUSCULOSKELETAL: Diffuse degenerative changes of the spine. No focal bony abnormality.  IMPRESSION:   1.  Removal of right chest tube. Persistent complex small stable volume right-sided pleural fluid. Suggestion of some debris within the fluid at the right chest base. Bubbles of pneumothorax gas on the right are smaller in size.  2.  Moderate left pleural fluid is slightly larger.  3.  Increased hazy opacities at the left upper lobe. Bilateral smooth interstitial thickening. These findings suggest a degree of pulmonary edema.  4.  Aspirated material leading to the right lower lobe again seen, but the aspirated material appears smaller since 08/20/2024. Moderate bibasilar atelectasis.  5.  A few additional stable findings.      By:  Modesto Joseph MD, 09/02/2024  8:57 AM    Primary Care Physician:  Cammy Bui

## 2024-09-02 NOTE — PROGRESS NOTES
[facility]    Medicine Progress Note - Hospitalist Service    Date of Admission:  8/17/2024    Assessment & Plan     74-year-old female with history of a flutter, COPD, ongoing tobacco abuse, previous pleural effusions and chronic pain admitted 8/17/24 with empyema and transferred from [facility] to [facility] for surgical management.       Empyema  Acute hypoxic respiratory failure  H/O recurrent right-sided pleural effusion status post bronchoscopy on 8/11/2024 with cultures that grew E. coli and Candida albicans.  Pleural effusion cultures growing strep pneumo  s/p VATS 8/19/24. No true emphyema or rind found, no ability to expand lung  Repeat CT 08/20 showed right pleural effusion s/p chest tube placement, moderate likely loculated/complicated residual as well as small associated right pneumothorax, reexpansion of right upper lobe with persistent collapse of right middle and lower lobe segments.  Significant retained secretions greater in the right lower lobe and increasing left pleural effusion  Plan was for bronchoscopy 08/21, not done as patient was requiring upto 10L oxygen and had high risk of being intubated  Chest tube removed 08/25   Acute hypoxemic respiratory failure worse 8/29-8/31 and likely related to eventual findings of moderate left pleural effusion and acute right sided systolic CHF   8/29: patient with complaints of increased SOB, productive cough and weakness. CXR 8/29 shows Increased opacification in the left lower lobe of concern for worsening pneumonia. Of note patient had remained afebrile and WBC remains normal on IV ceftriaxone for pneumococcus and E coli . Yeast on BAL thought to be colonization  Discussed with ID 8/29 and they recommend no changes  Overnight 8/29-8/30 patient with worsening hypoxemic respiratory failure.   CXR 8/30 with evidence of worsening pulmonary edema and possibly improved LLL opacity.  BNP elevated but improved from previous  CT  chest 8/31 showed moderate left pleural effusion and stable changes on right post surgery  S/p thoracentesis 8/31 with 0.8L of clear fluid removed and consistent with transudate  Consulted cardiology regarding difficult diuresis  TTE performed 8/31 and showed preserved LVEF with new evidence of decreased RV systolic function  -- cardiology recommends holding diuresis for now given her pre-load dependence with acute right sided heart failure  -- pulmonary consulted to assist with further cares  -- continue current nebs and pulmonary hygiene  -- ID following and recommends no changes to current antibiotic plan since current respiratory failure is volume related  -- current plan is for Ceftriaxone until 09/08 and then will need CXR and likely po Augmentin until follow up CT Chest with Pulmonary  --- seen by SLP, recommend regular/thin liquids  --- Follow up in Pulmonary clinic 09/25, plan CT chest to be done prior to visit  -- Follow up with Surgery outpatient            Metabolic alkalosis:  Suspect primary metabolic alkalosis due to Lasix and poor oral intake  -- labs stable, trend off diuretics       Paroxysmal A-fib/a flutter; status post PPM  Aortic stenosis status post TAVR  Acute on chronic HFpEF with new right sided acute systolic CHF  TTE 8/31 showed preserved EF with new evidence of decreased RV systolic function  -- Currently on metoprolol, diltiazem and digoxin with hold parameters  -- She is off amiodarone due to QTc prolongation  -- PTA lasix 40mg - held due to metabolic alkalosis and pre-load dependence given new right sided heart failure  -- On Eliquis  -- Cardiology signed off x2      Pancytopenia  Acute on chronic anemia  -- CBC stable, trend       Deconditioning   -- PT/OT recommend TCU      Hyperlipidemia: continue statin       DM2:  Not on home hypoglycemic meds  Last A1c 6.2  -- continue sliding scale insulin        Sacral erythema  Berna anal skin breakdown  -- Calmoseptine, WOC consult        Epigastric pain  Denies nausea, vomting  -- continue PPI, Sucralfate,Tums prn and Maalox prn               Diet: Fluid restriction 1800 ML FLUID  Regular Diet Adult    DVT Prophylaxis: DOAC  Chairez Catheter: Not present  Lines: PRESENT      PICC 08/10/24 Triple Lumen Right Brachial vein medial-Site Assessment: WDL      Cardiac Monitoring: None  Code Status: Full Code      Clinically Significant Risk Factors              # Hypoalbuminemia: Lowest albumin = 2.2 g/dL at 8/18/2024  7:04 AM, will monitor as appropriate  # Coagulation Defect: INR = 1.81 (Ref range: 0.85 - 1.15) and/or PTT = 38 Seconds (Ref range: 22 - 38 Seconds), will monitor for bleeding  # Thrombocytopenia: Lowest platelets = 116 in last 2 days, will monitor for bleeding   # Hypertension: Noted on problem list                 # Financial/Environmental Concerns: none   # Pacemaker present             Disposition Plan   Medically Ready for Discharge: Anticipated in 2-4 Days      Donte Dutta DO  Hospitalist Service  [facility]  Securely message with [employer] (more info)  Text page via [employer] Paging/Directory   ______________________________________________________________________    Interval History   NAD. Feeling much improved this AM, breathing, appetite and energy all improved    Physical Exam   Vital Signs: Temp: 97.6  F (36.4  C) Temp src: Oral BP: 103/55 Pulse: 85   Resp: 18 SpO2: 97 % O2 Device: Nasal cannula Oxygen Delivery: 4 LPM  Weight: 145 lbs 11.58 oz  General: NAD  RESPIRATORY: Breathing nonlabored  CARDIOVASCULAR: No le edema bilat.   ABDOMEN: soft and non-tender  NEUROLOGIC: Motor and sensory intact, speech clear         >50 MINUTES SPENT BY ME on the date of service doing chart review, history, exam, documentation & further activities per the note.  Data

## 2024-09-03 ENCOUNTER — APPOINTMENT (OUTPATIENT)
Dept: RADIOLOGY | Facility: HOSPITAL | Age: 74
DRG: 166 | End: 2024-09-03
Attending: INTERNAL MEDICINE
Payer: COMMERCIAL

## 2024-09-03 ENCOUNTER — APPOINTMENT (OUTPATIENT)
Dept: OCCUPATIONAL THERAPY | Facility: HOSPITAL | Age: 74
DRG: 166 | End: 2024-09-03
Attending: FAMILY MEDICINE
Payer: COMMERCIAL

## 2024-09-03 LAB
ANION GAP SERPL CALCULATED.3IONS-SCNC: 5 MMOL/L (ref 7–15)
BUN SERPL-MCNC: 11.2 MG/DL (ref 8–23)
CALCIUM SERPL-MCNC: 8.4 MG/DL (ref 8.8–10.4)
CHLORIDE SERPL-SCNC: 96 MMOL/L (ref 98–107)
CREAT SERPL-MCNC: 0.49 MG/DL (ref 0.51–0.95)
EGFRCR SERPLBLD CKD-EPI 2021: >90 ML/MIN/1.73M2
ERYTHROCYTE [DISTWIDTH] IN BLOOD BY AUTOMATED COUNT: 20.5 % (ref 10–15)
GLUCOSE BLDC GLUCOMTR-MCNC: 127 MG/DL (ref 70–99)
GLUCOSE BLDC GLUCOMTR-MCNC: 135 MG/DL (ref 70–99)
GLUCOSE BLDC GLUCOMTR-MCNC: 135 MG/DL (ref 70–99)
GLUCOSE BLDC GLUCOMTR-MCNC: 138 MG/DL (ref 70–99)
GLUCOSE BLDC GLUCOMTR-MCNC: 166 MG/DL (ref 70–99)
GLUCOSE BLDC GLUCOMTR-MCNC: 176 MG/DL (ref 70–99)
GLUCOSE SERPL-MCNC: 128 MG/DL (ref 70–99)
HCO3 SERPL-SCNC: 39 MMOL/L (ref 22–29)
HCT VFR BLD AUTO: 26.2 % (ref 35–47)
HGB BLD-MCNC: 7.4 G/DL (ref 11.7–15.7)
MAGNESIUM SERPL-MCNC: 1.8 MG/DL (ref 1.7–2.3)
MCH RBC QN AUTO: 24.9 PG (ref 26.5–33)
MCHC RBC AUTO-ENTMCNC: 28.2 G/DL (ref 31.5–36.5)
MCV RBC AUTO: 88 FL (ref 78–100)
NT-PROBNP SERPL-MCNC: 2953 PG/ML (ref 0–900)
PLATELET # BLD AUTO: 122 10E3/UL (ref 150–450)
POTASSIUM SERPL-SCNC: 3.9 MMOL/L (ref 3.4–5.3)
RBC # BLD AUTO: 2.97 10E6/UL (ref 3.8–5.2)
SODIUM SERPL-SCNC: 140 MMOL/L (ref 135–145)
WBC # BLD AUTO: 2.5 10E3/UL (ref 4–11)

## 2024-09-03 PROCEDURE — 250N000011 HC RX IP 250 OP 636: Performed by: STUDENT IN AN ORGANIZED HEALTH CARE EDUCATION/TRAINING PROGRAM

## 2024-09-03 PROCEDURE — 250N000011 HC RX IP 250 OP 636: Performed by: INTERNAL MEDICINE

## 2024-09-03 PROCEDURE — 80048 BASIC METABOLIC PNL TOTAL CA: CPT | Performed by: SPECIALIST

## 2024-09-03 PROCEDURE — 83735 ASSAY OF MAGNESIUM: CPT | Performed by: INTERNAL MEDICINE

## 2024-09-03 PROCEDURE — 250N000009 HC RX 250: Performed by: INTERNAL MEDICINE

## 2024-09-03 PROCEDURE — 250N000013 HC RX MED GY IP 250 OP 250 PS 637: Performed by: FAMILY MEDICINE

## 2024-09-03 PROCEDURE — 250N000011 HC RX IP 250 OP 636: Mod: JZ | Performed by: INTERNAL MEDICINE

## 2024-09-03 PROCEDURE — 99223 1ST HOSP IP/OBS HIGH 75: CPT | Performed by: INTERNAL MEDICINE

## 2024-09-03 PROCEDURE — 83880 ASSAY OF NATRIURETIC PEPTIDE: CPT | Performed by: INTERNAL MEDICINE

## 2024-09-03 PROCEDURE — G0463 HOSPITAL OUTPT CLINIC VISIT: HCPCS | Mod: 25

## 2024-09-03 PROCEDURE — 250N000013 HC RX MED GY IP 250 OP 250 PS 637: Performed by: STUDENT IN AN ORGANIZED HEALTH CARE EDUCATION/TRAINING PROGRAM

## 2024-09-03 PROCEDURE — 99207 PR NO CHARGE LOS: CPT | Performed by: INTERNAL MEDICINE

## 2024-09-03 PROCEDURE — 94640 AIRWAY INHALATION TREATMENT: CPT | Mod: 76

## 2024-09-03 PROCEDURE — 71045 X-RAY EXAM CHEST 1 VIEW: CPT

## 2024-09-03 PROCEDURE — 250N000013 HC RX MED GY IP 250 OP 250 PS 637: Performed by: SPECIALIST

## 2024-09-03 PROCEDURE — 94799 UNLISTED PULMONARY SVC/PX: CPT

## 2024-09-03 PROCEDURE — 94640 AIRWAY INHALATION TREATMENT: CPT

## 2024-09-03 PROCEDURE — 97535 SELF CARE MNGMENT TRAINING: CPT | Mod: GO

## 2024-09-03 PROCEDURE — 999N000157 HC STATISTIC RCP TIME EA 10 MIN

## 2024-09-03 PROCEDURE — 120N000004 HC R&B MS OVERFLOW

## 2024-09-03 PROCEDURE — 250N000009 HC RX 250

## 2024-09-03 PROCEDURE — 99233 SBSQ HOSP IP/OBS HIGH 50: CPT | Performed by: INTERNAL MEDICINE

## 2024-09-03 PROCEDURE — 85014 HEMATOCRIT: CPT | Performed by: INTERNAL MEDICINE

## 2024-09-03 PROCEDURE — P9047 ALBUMIN (HUMAN), 25%, 50ML: HCPCS | Performed by: INTERNAL MEDICINE

## 2024-09-03 RX ORDER — ALBUMIN (HUMAN) 12.5 G/50ML
50 SOLUTION INTRAVENOUS ONCE
Status: COMPLETED | OUTPATIENT
Start: 2024-09-03 | End: 2024-09-03

## 2024-09-03 RX ORDER — DOBUTAMINE HYDROCHLORIDE 200 MG/100ML
1.25 INJECTION INTRAVENOUS CONTINUOUS
Status: DISCONTINUED | OUTPATIENT
Start: 2024-09-03 | End: 2024-09-06

## 2024-09-03 RX ORDER — METOPROLOL TARTRATE 25 MG/1
25 TABLET, FILM COATED ORAL 2 TIMES DAILY
Status: DISCONTINUED | OUTPATIENT
Start: 2024-09-03 | End: 2024-09-08

## 2024-09-03 RX ORDER — FUROSEMIDE 10 MG/ML
20 INJECTION INTRAMUSCULAR; INTRAVENOUS EVERY 8 HOURS
Status: DISCONTINUED | OUTPATIENT
Start: 2024-09-03 | End: 2024-09-03

## 2024-09-03 RX ORDER — FUROSEMIDE 10 MG/ML
20 INJECTION INTRAMUSCULAR; INTRAVENOUS ONCE
Status: COMPLETED | OUTPATIENT
Start: 2024-09-03 | End: 2024-09-03

## 2024-09-03 RX ORDER — FUROSEMIDE 10 MG/ML
20 INJECTION INTRAMUSCULAR; INTRAVENOUS EVERY 12 HOURS
Status: DISCONTINUED | OUTPATIENT
Start: 2024-09-04 | End: 2024-09-04

## 2024-09-03 RX ORDER — FUROSEMIDE 10 MG/ML
40 INJECTION INTRAMUSCULAR; INTRAVENOUS EVERY 8 HOURS
Status: DISCONTINUED | OUTPATIENT
Start: 2024-09-03 | End: 2024-09-03

## 2024-09-03 RX ADMIN — ALBUMIN HUMAN 50 G: 0.25 SOLUTION INTRAVENOUS at 16:18

## 2024-09-03 RX ADMIN — DOBUTAMINE HYDROCHLORIDE 2.5 MCG/KG/MIN: 200 INJECTION INTRAVENOUS at 17:44

## 2024-09-03 RX ADMIN — SUCRALFATE 1 G: 1 TABLET ORAL at 12:13

## 2024-09-03 RX ADMIN — FLUTICASONE FUROATE AND VILANTEROL TRIFENATATE 1 PUFF: 200; 25 POWDER RESPIRATORY (INHALATION) at 08:02

## 2024-09-03 RX ADMIN — ALTEPLASE 2 MG: 2.2 INJECTION, POWDER, LYOPHILIZED, FOR SOLUTION INTRAVENOUS at 23:21

## 2024-09-03 RX ADMIN — SUCRALFATE 1 G: 1 TABLET ORAL at 05:51

## 2024-09-03 RX ADMIN — SODIUM CHLORIDE SOLN NEBU 3% 3 ML: 3 NEBU SOLN at 13:41

## 2024-09-03 RX ADMIN — ACETYLCYSTEINE 2 ML: 200 SOLUTION ORAL; RESPIRATORY (INHALATION) at 13:41

## 2024-09-03 RX ADMIN — ACETAMINOPHEN 650 MG: 325 TABLET, FILM COATED ORAL at 05:58

## 2024-09-03 RX ADMIN — FUROSEMIDE 20 MG: 10 INJECTION, SOLUTION INTRAMUSCULAR; INTRAVENOUS at 19:05

## 2024-09-03 RX ADMIN — LEVALBUTEROL HYDROCHLORIDE 1.25 MG: 1.25 SOLUTION RESPIRATORY (INHALATION) at 08:45

## 2024-09-03 RX ADMIN — CEFTRIAXONE SODIUM 2 G: 2 INJECTION, POWDER, FOR SOLUTION INTRAMUSCULAR; INTRAVENOUS at 12:15

## 2024-09-03 RX ADMIN — APIXABAN 5 MG: 5 TABLET, FILM COATED ORAL at 21:45

## 2024-09-03 RX ADMIN — IPRATROPIUM BROMIDE 0.5 MG: 0.5 SOLUTION RESPIRATORY (INHALATION) at 19:24

## 2024-09-03 RX ADMIN — SENNOSIDES AND DOCUSATE SODIUM 1 TABLET: 8.6; 5 TABLET ORAL at 21:46

## 2024-09-03 RX ADMIN — GABAPENTIN 600 MG: 300 CAPSULE ORAL at 08:03

## 2024-09-03 RX ADMIN — SODIUM CHLORIDE SOLN NEBU 3% 3 ML: 3 NEBU SOLN at 08:45

## 2024-09-03 RX ADMIN — LEVALBUTEROL HYDROCHLORIDE 1.25 MG: 1.25 SOLUTION RESPIRATORY (INHALATION) at 19:24

## 2024-09-03 RX ADMIN — ATORVASTATIN CALCIUM 20 MG: 10 TABLET, FILM COATED ORAL at 21:45

## 2024-09-03 RX ADMIN — ALTEPLASE 2 MG: 2.2 INJECTION, POWDER, LYOPHILIZED, FOR SOLUTION INTRAVENOUS at 21:39

## 2024-09-03 RX ADMIN — IPRATROPIUM BROMIDE 0.5 MG: 0.5 SOLUTION RESPIRATORY (INHALATION) at 08:45

## 2024-09-03 RX ADMIN — LIDOCAINE 1 PATCH: 4 PATCH TOPICAL at 21:43

## 2024-09-03 RX ADMIN — ACETYLCYSTEINE 2 ML: 200 SOLUTION ORAL; RESPIRATORY (INHALATION) at 08:45

## 2024-09-03 RX ADMIN — DIGOXIN 125 MCG: 125 TABLET ORAL at 08:10

## 2024-09-03 RX ADMIN — IPRATROPIUM BROMIDE 0.5 MG: 0.5 SOLUTION RESPIRATORY (INHALATION) at 13:40

## 2024-09-03 RX ADMIN — ALTEPLASE 2 MG: 2.2 INJECTION, POWDER, LYOPHILIZED, FOR SOLUTION INTRAVENOUS at 21:41

## 2024-09-03 RX ADMIN — GABAPENTIN 600 MG: 300 CAPSULE ORAL at 14:37

## 2024-09-03 RX ADMIN — APIXABAN 5 MG: 5 TABLET, FILM COATED ORAL at 08:03

## 2024-09-03 RX ADMIN — LEVALBUTEROL HYDROCHLORIDE 1.25 MG: 1.25 SOLUTION RESPIRATORY (INHALATION) at 13:40

## 2024-09-03 RX ADMIN — SUCRALFATE 1 G: 1 TABLET ORAL at 17:59

## 2024-09-03 RX ADMIN — GABAPENTIN 600 MG: 300 CAPSULE ORAL at 21:45

## 2024-09-03 RX ADMIN — PANTOPRAZOLE SODIUM 40 MG: 40 TABLET, DELAYED RELEASE ORAL at 05:50

## 2024-09-03 RX ADMIN — SODIUM CHLORIDE SOLN NEBU 3% 3 ML: 3 NEBU SOLN at 19:24

## 2024-09-03 RX ADMIN — ACETYLCYSTEINE 2 ML: 200 SOLUTION ORAL; RESPIRATORY (INHALATION) at 19:25

## 2024-09-03 ASSESSMENT — ACTIVITIES OF DAILY LIVING (ADL)
ADLS_ACUITY_SCORE: 53
ADLS_ACUITY_SCORE: 51
ADLS_ACUITY_SCORE: 46
ADLS_ACUITY_SCORE: 46
ADLS_ACUITY_SCORE: 53
ADLS_ACUITY_SCORE: 46
ADLS_ACUITY_SCORE: 44
ADLS_ACUITY_SCORE: 53
ADLS_ACUITY_SCORE: 55
ADLS_ACUITY_SCORE: 46
ADLS_ACUITY_SCORE: 53
ADLS_ACUITY_SCORE: 53
ADLS_ACUITY_SCORE: 44
ADLS_ACUITY_SCORE: 44
ADLS_ACUITY_SCORE: 53
ADLS_ACUITY_SCORE: 51
ADLS_ACUITY_SCORE: 44
ADLS_ACUITY_SCORE: 55
ADLS_ACUITY_SCORE: 53
ADLS_ACUITY_SCORE: 55
ADLS_ACUITY_SCORE: 53

## 2024-09-03 NOTE — PROGRESS NOTES
[facility] Infectious Disease  Afebrile  On CTX  Intensivist managing pleural effusion    Mary Garcia M.D.

## 2024-09-03 NOTE — PROGRESS NOTES
PULMONARY / CRITICAL CARE PROGRESS NOTE    Date / Time of Admission:  8/17/2024  1:56 PM    Assessment:     Mary Kay Tejada is a 74 year old female with history of HTN, CAD, HFpEF, afib on DOAC, aortic stenosis s/p TAVR, s/p pacemaker, COPD, tobacco user.   Hospitalized from 7/12 to 7/17 with acute hypercarbic respiratory failure, pneumonia and COVI19 viral infection. CT scan showed RUL collapse, right effusion. Sputum Cx (+) E coli. Developed pneumothorax post right side thoracentesis. Discharged home on O2 and Abx.  Presents to  ED on 8/10/2024 for evaluation of shortness of breath. CXR showed large right pleural effusion. Chest tube was placed and reposition the following day. Diagnostic bronchoscopy 8/11 showed no endobronchial lesions. Pleural fluid (+) strep pneumonia. Follow up chest CT scan showed loculated effusion, started on intrapleural lytics. Follow up CXR showed large right effusion. Transferred to [facility] for surgical treatment. S/p VATS and decortication on 8/19.   Full expansion of right lung in follow up images. Chest tube was removed on 8/25.   Patient had increased O2 requirements, clinically volume up, X ray showed pulmonary congestion and bilateral pleural effusions. High proBNP. Therapeutic left side thoracentesis, transudate effusion. Diuresis was titrated up, improvement of O2 requirements.     Acute respiratory failure   Empyema  S/p pig tail chest tube 8/10 and reposition on 8/11 with resolution of pleural effusion.   Pleural fluid grew Strep pneumonia.   Follow up CT scan 8/13 showed re accumulation of pleural effusion, loculated effusion.   Started on intrapleural lytics. Follow up CXR 8/15 showed large right effusion with total opacification of right hemithorax.   Surgery consulted. S/p VATS and decortication on 8/19.   Removal of chest tube on 8/25.    Abx per ID, currently on ceftriaxone.   Sp treatment of pneumonia   CT scan showed debris in airway. Chronic collapse  right upper lobe. Atelectasis RLL.   S/p diagnostic bronchoscopy 8/11, oral candidiasis, hyperemic mucosa, and mild thick secretions, no endobronchial lesions. BAL RUL and RLL were done.  BAL grew E coli. Sputum Cx grew E coli and strep pneumonia.   Pulmonary toiletting.   Diet per speech therapy.   Decompensated cardiomyopathy   Titrate BP meds.   Follow up BNP level , CXR in AM.   Consider to resume diuresis   Transudate left pleural effusion   S/p therapeutic thoracentesis 8/31. Removal of 0.8 L   COPD  Continue schedule bronchodilators. ICS/LABA.   A fib / flutter with RVR  Cardiology is following. Off amiodarone due to prolonged QT interval.   On digoxin, metoprolol and diltiazem  Patient is anticoagulated.   HTN, CAD, HFpEF, aortic stenosis s/p TAVR, s/p pacemaker  Tobacco user  Deconditioning      Advance Directives:  Full code     Plan:     Titrate FiO2 to keep SpO2 > 90%, currently on nasal cannula 3 LPM  Schedule bronchodilators  Abx ceftriaxone   Titrate BP meds.   I would recommend to stop dilt and lower the dose of metoprolol to give room for daily diuretics.   Goal should be to keep her net negative to euvolemic.   Regarding her right sided effusion, no further interventions are recommended.  It has had VATS and decorticated and still effusion is left. Further interventions will not be of any use.    Follow up BNP level daily. And decide on diuresis.   CXR in AM  Monitor kidney function   Supplement electrolytes as needed  Diet per speech therapy   Glucose level monitoring   DVT prophylaxis , apixaban   PT, OT evaluation     Please contact me if you have any questions.    Davin Moss MD  Pulmonary / Critical Care  09/03/2024  1:44 PM      Subjective:   HPI:  Mary Kay Tejada is a 74 year old female with history of HTN, CAD, HFpEF, afib on DOAC, aortic stenosis s/p TAVR, s/p pacemaker, COPD, tobacco user.   Hospitalized from 7/12 to 7/17 with acute hypercarbic respiratory failure, pneumonia and COVI19  viral infection. CT scan showed RUL collapse, right effusion. Sputum Cx (+) E coli. Developed pneumothorax post right side thoracentesis. Discharged home on O2 and Abx.  Presents to  ED on 8/10/2024 for evaluation of shortness of breath. CXR showed large right pleural effusion. Chest tube was placed and reposition the following day. Diagnostic bronchoscopy 8/11 showed no endobronchial lesions. Pleural fluid (+) strep pneumonia. Follow up chest CT scan showed loculated effusion, started on intrapleural lytics. Follow up CXR showed large right effusion. Transferred to [facility] for surgical treatment. S/p VATS and decortication on 8/19.   Full expansion of right lung in follow up images. Chest tube was removed on 8/25.   Patient had increased O2 requirements, clinically volume up, X ray showed pulmonary congestion and bilateral pleural effusions. High proBNP. Diuresis was titrated up, improvement of O2 requirements.     Events overnight  - Improvement of shortness of breath   - Decrease appetite, denies fever, chills or night sweats.   - Working with PT     Allergies: Celebrex [celecoxib] and Latex     MEDS:  Current Facility-Administered Medications   Medication Dose Route Frequency Provider Last Rate Last Admin    acetaminophen (TYLENOL) tablet 650 mg  650 mg Oral Q4H PRN Lauren Willson MD   650 mg at 09/03/24 0558    Or    acetaminophen (TYLENOL) Suppository 650 mg  650 mg Rectal Q4H PRN Lauren Willson MD        acetylcysteine (MUCOMYST) 20 % nebulizer solution 2 mL  2 mL Nebulization 4x Daily Donte Dutta DO   2 mL at 09/03/24 0845    [Held by provider] albuterol (PROVENTIL HFA/VENTOLIN HFA) inhaler  2 puff Inhalation Q4H PRN Angela Kaiser MD        albuterol (PROVENTIL) neb solution 2.5 mg  2.5 mg Nebulization Q4H PRN Charlotte Paredes MD        alum & mag hydroxide-simethicone (MAALOX) suspension 30 mL  30 mL Oral Q4H PRN Lauren Willson MD        apixaban ANTICOAGULANT (ELIQUIS)  tablet 5 mg  5 mg Oral BID Lauren Willson MD   5 mg at 09/03/24 0803    artificial saliva (BIOTENE MT) solution 1 spray  1 spray Mouth/Throat 4x Daily Renée Soriano MD   1 spray at 09/02/24 2057    atorvastatin (LIPITOR) tablet 20 mg  20 mg Oral At Bedtime Renée Soriano MD   20 mg at 09/02/24 2050    benzocaine-menthol (CHLORASEPTIC) 6-10 MG lozenge 1 lozenge  1 lozenge Buccal Q1H PRN Renée Soriano MD        bisacodyl (DULCOLAX) suppository 10 mg  10 mg Rectal Daily PRN Renée Soriano MD        calcium carbonate (TUMS) chewable tablet 1,000 mg  1,000 mg Oral 4x Daily PRN Renée Soriano MD        cefTRIAXone (ROCEPHIN) 2 g vial to attach to  ml bag for ADULTS or NS 50 ml bag for PEDS  2 g Intravenous Q24H Ravindra Bacon  mL/hr at 08/31/24 1213 2 g at 09/03/24 1215    glucose gel 15-30 g  15-30 g Oral Q15 Min PRN Renée Soriano MD        Or    dextrose 50 % injection 25-50 mL  25-50 mL Intravenous Q15 Min PRN Renée Soriano MD        Or    glucagon injection 1 mg  1 mg Subcutaneous Q15 Min PRN Reneé Soriaon MD        digoxin (LANOXIN) tablet 125 mcg  125 mcg Oral Daily Renée Soriano MD   125 mcg at 09/03/24 0810    [Held by provider] diltiazem ER COATED BEADS (CARDIZEM CD/CARTIA XT) 24 hr capsule 120 mg  120 mg Oral Daily Renée Soriano MD   120 mg at 08/31/24 1213    fluticasone-vilanterol (BREO ELLIPTA) 200-25 MCG/ACT inhaler 1 puff  1 puff Inhalation Daily Renée Soriano MD   1 puff at 09/03/24 0802    gabapentin (NEURONTIN) capsule 600 mg  600 mg Oral TID Renée Soriano MD   600 mg at 09/03/24 0803    HYDROmorphone (DILAUDID) injection 0.2 mg  0.2 mg Intravenous Q2H PRN Renée Soriano MD   0.2 mg at 08/20/24 0746    Or    HYDROmorphone (DILAUDID) injection 0.4 mg  0.4 mg Intravenous Q2H PRN Renée Soriano MD        insulin aspart (NovoLOG) injection (RAPID ACTING)  1-10 Units Subcutaneous TID AC Renée Soriano MD   3 Units at 09/02/24 1711    insulin aspart (NovoLOG)  injection (RAPID ACTING)  1-7 Units Subcutaneous At Bedtime Renée Soriano MD   3 Units at 09/02/24 2055    levalbuterol (XOPENEX) neb solution 1.25 mg  1.25 mg Nebulization 4x Daily Aamir Ross G, RT   1.25 mg at 09/03/24 0845    And    ipratropium (ATROVENT) 0.02 % neb solution 0.5 mg  0.5 mg Nebulization 4x daily Stohr, Bonna G, RT   0.5 mg at 09/03/24 0845    ipratropium - albuterol 0.5 mg/2.5 mg/3 mL (DUONEB) neb solution 3 mL  3 mL Nebulization Q4H PRN Renée Soriano MD        Lidocaine (LIDOCARE) 4 % Patch 1 patch  1 patch Transdermal Q24h Antonia Baptiste MD   1 patch at 09/02/24 2047    lidocaine (LMX4) cream   Topical Q1H PRN Renée Soriano MD        lidocaine 1 % 0.1-1 mL  0.1-1 mL Other Q1H PRN Renée Soriano MD        magnesium hydroxide (MILK OF MAGNESIA) suspension 30 mL  30 mL Oral Daily PRN Renée Sorinao MD        melatonin tablet 1 mg  1 mg Oral At Bedtime PRN Renée Soriano MD        menthol-zinc oxide (CALMOSEPTINE) 0.44-20.6 % ointment OINT   Topical 4x Daily PRN Lauren Willson MD   Given at 08/29/24 2317    metoprolol tartrate (LOPRESSOR) tablet 25 mg  25 mg Oral BID Donte Dutta DO        miconazole (MICATIN) 2 % powder   Topical TID PRN Renée Soriano MD        naloxone (NARCAN) injection 0.2 mg  0.2 mg Intravenous Q2 Min PRN Renée Soriano MD        naloxone (NARCAN) injection 0.2 mg  0.2 mg Intramuscular Q2 Min PRN Renée Soriano MD        naloxone (NARCAN) injection 0.4 mg  0.4 mg Intravenous Q2 Min PRN Renée Soriano MD        naloxone (NARCAN) injection 0.4 mg  0.4 mg Intramuscular Q2 Min PRN Rneée Soriano MD        ondansetron (ZOFRAN ODT) ODT tab 4 mg  4 mg Oral Q6H PRN Renée Soriano MD   4 mg at 08/25/24 0825    Or    ondansetron (ZOFRAN) injection 4 mg  4 mg Intravenous Q6H PRN Renée Soriano MD   4 mg at 08/25/24 0253    pantoprazole (PROTONIX) EC tablet 40 mg  40 mg Oral QAM AC Renée Soriano MD   40 mg at 09/03/24 0550    polyethylene glycol (MIRALAX)  Packet 17 g  17 g Oral Daily Renée Soriano MD   17 g at 08/28/24 0803    polyethylene glycol (MIRALAX) Packet 17 g  17 g Oral Daily PRN Renée Soriano MD   17 g at 08/23/24 0903    prochlorperazine (COMPAZINE) injection 5 mg  5 mg Intravenous Q6H PRN Renée Soriano MD        Or    prochlorperazine (COMPAZINE) tablet 5 mg  5 mg Oral Q6H PRN Renée Soriano MD        Or    prochlorperazine (COMPAZINE) suppository 12.5 mg  12.5 mg Rectal Q12H PRN Renée Soriano MD        senna-docusate (SENOKOT-S/PERICOLACE) 8.6-50 MG per tablet 1 tablet  1 tablet Oral BID Renée Soriano MD   1 tablet at 09/02/24 2049    senna-docusate (SENOKOT-S/PERICOLACE) 8.6-50 MG per tablet 1 tablet  1 tablet Oral BID PRN Renée Soriano MD        Or    senna-docusate (SENOKOT-S/PERICOLACE) 8.6-50 MG per tablet 2 tablet  2 tablet Oral BID PRN Renée Soriano MD        sodium chloride (NEBUSAL) 3 % neb solution 3 mL  3 mL Nebulization 4x daily Carmelita Buckley MD   3 mL at 09/03/24 0845    sodium chloride (PF) 0.9% PF flush 10 mL  10 mL Intracatheter Q8H Lauren Willson MD   10 mL at 09/03/24 0807    sodium chloride (PF) 0.9% PF flush 10-20 mL  10-20 mL Intracatheter q1 min prn Renée Soriano MD   10 mL at 08/21/24 1228    sodium chloride (PF) 0.9% PF flush 10-40 mL  10-40 mL Intracatheter Q7 Days Renée Soriano MD   30 mL at 08/31/24 1754    sodium chloride (PF) 0.9% PF flush 10-40 mL  10-40 mL Intracatheter Q1H PRN Renée Soriano MD   10 mL at 08/21/24 1231    sodium chloride (PF) 0.9% PF flush 3 mL  3 mL Intracatheter q1 min prn Renée Soriano MD        sodium chloride (PF) 0.9% PF flush 3 mL  3 mL Intracatheter q1 min prn Renée Soriano MD        sterile talc (STERITALC) powder for sclerosing 4 g  4 g INTRAPLEURAL Once Renée Soriano MD        sucralfate (CARAFATE) tablet 1 g  1 g Oral TID AC Renée Soriano MD   1 g at 09/03/24 1213    traMADol (ULTRAM) tablet 50 mg  50 mg Oral Q6H PRN Renée Soriano MD   50 mg at 08/29/24 1024      Objective:   VITALS:  BP 93/51 (BP Location: Left arm, Patient Position: Semi-Man's, Cuff Size: Adult Small)   Pulse 85   Temp 97.7  F (36.5  C) (Oral)   Resp 22   Wt 66.1 kg (145 lb 11.6 oz)   LMP  (LMP Unknown)   SpO2 95%   BMI 24.25 kg/m    VENT:  Resp: 22  EXAM:   Gen: awake, alert, no distress  HEENT: pink conjunctiva, moist mucosa, Mallampati II/IV  Neck: no thyromegaly, masses or JVD  Lungs: decrease breath sounds at the bases, R>L  CV: regular, no murmurs or gallops appreciated  Abdomen: soft, NT, BS wnl  Ext: no edema  Neuro: CN II-XII intact, non focal       Data Review:  Recent Labs   Lab 09/03/24  1202 09/03/24  0734 09/03/24  0547 09/03/24  0332 09/02/24  2047 09/02/24  1634   * 127* 128* 138* 250* 195*        09/02/24 06:00   Sodium 141   Potassium 4.1   Chloride 96 (L)   Carbon Dioxide (CO2) 43 (H)   Urea Nitrogen 10.2   Creatinine 0.52   GFR Estimate >90   Calcium 8.5 (L)   Anion Gap 2 (L)   Magnesium 1.8   Glucose 117 (H)      09/02/24 06:00   WBC 2.8 (L)   Hemoglobin 7.8 (L)   Hematocrit 28.0 (L)   Platelet Count 127 (L)   RBC Count 3.16 (L)   MCV 89   MCH 24.7 (L)   MCHC 27.9 (L)   RDW 20.6 (H)      CT CHEST WITHOUT CONTRAST  LOCATION: [facility]  DATE: 08/31/2024  INDICATION: Right lower lobe infiltrate status post VATS, now with increasing oxygen requirements.  COMPARISON: CT chest 08/20/2024.  FINDINGS:   LUNGS AND PLEURA: Moderate left pleural fluid is slightly larger. No left pneumothorax. Interval removal of right chest tube. There is persistent complex small volume right pleural effusion with small pneumothorax gas at the right chest base. The pneumothorax gas is smaller in size in the interval. Some apparent debris at the pleural fluid posteriorly is again suggested on series 2 image 47. Moderate right and left base atelectasis. Small aspirated material within the right main bronchus image 58, though the volume of aspirated material appears  decreased in the interval. Emphysematous changes. Mildly increased hazy opacities at the left upper lobe. Mild smooth interstitial thickening bilaterally.  MEDIASTINUM/AXILLAE: Scattered thoracic aortic calcifications without acute abnormality. Stable small mediastinal lymph nodes. No new acute mediastinal abnormality. Left chest pacer. Right PICC line tip at the mid SVC. TAVR.  CORONARY ARTERY CALCIFICATION: Severe.  UPPER ABDOMEN: Stable hypodense enlargement of the bilateral adrenals. In particular, the left adrenal is 3.4 cm. Low-density suggests adenomas. No acute upper abdominal abnormality.   MUSCULOSKELETAL: Diffuse degenerative changes of the spine. No focal bony abnormality.  IMPRESSION:   1.  Removal of right chest tube. Persistent complex small stable volume right-sided pleural fluid. Suggestion of some debris within the fluid at the right chest base. Bubbles of pneumothorax gas on the right are smaller in size.  2.  Moderate left pleural fluid is slightly larger.  3.  Increased hazy opacities at the left upper lobe. Bilateral smooth interstitial thickening. These findings suggest a degree of pulmonary edema.  4.  Aspirated material leading to the right lower lobe again seen, but the aspirated material appears smaller since 08/20/2024. Moderate bibasilar atelectasis.  5.  A few additional stable findings.      By:  Modesto Joseph MD, 09/03/2024  1:44 PM    Primary Care Physician:  Cammy Bui

## 2024-09-03 NOTE — PLAN OF CARE
Problem: Adult Inpatient Plan of Care  Goal: Optimal Comfort and Wellbeing  Outcome: Progressing     Problem: Gas Exchange Impaired  Goal: Optimal Gas Exchange  Outcome: Progressing  Intervention: Optimize Oxygenation and Ventilation  Recent Flowsheet Documentation  Taken 9/3/2024 0510 by Sanam Angeles, RN  Head of Bed ([facility]) Positioning: HOB at 20 degrees  Taken 9/3/2024 0130 by Sanam Angeles, RN  Head of Bed ([facility]) Positioning: HOB at 20-30 degrees   Goal Outcome Evaluation:  No new issues overnight.  Slept well.  Maintained sats in the 90's on 3 liters oxygen.  Tylenol given for right chest tube site pain.

## 2024-09-03 NOTE — CONSULTS
HEART CARE NOTE        Thank you, Dr. Lee, for asking the [facility] team to see Mary Kay Tjeada to evaluate RV dysfunction.    Assessment/Recommendations     1. HFpEF/RV dysfunction c/b cardiogenic shock  Assessment / Plan  Hypervolemic on physical exam - initiate IV diuresis; continue to monitor UOP and renal function closely   Hemodynamics less the adequate - add dobutamine gtt and continue to monitor  Echo significant for RV dysfunction. However, RVSP/PASP as well as LV function noted to be wnl - repeat echo once near euvolemia; may benefit from CT PE protocol, +/- VQ scan and possible RHC + shunt run +/- vasodilator challenge to further evaluate isolated RV dysfunction  GDMT as detailed below; mainstay of treatment for HFpEF includes diuretics and adequate BP control (class I) and SGLT2-I (class 2a); additional medical therapy (ARNI, MRA, ARB) demonstrated less robust evidence for indication but may be considered per guideline recommendations (2b); no indication for Bblockers  GDMT medical therapy on hold given the above    2.  Valvular heart disease  Assessment / Plan  S/p TAVR - adequate function on recent imaging      3. Afib/flutter  Assessment / Plan  Continue digoxin, apixaban - metoprolol held while on inotrope     4. CAD  Assessment / Plan  Denies chest pain or anginal equivalents  S/p coronary angiogram significant for mild non-obstructive disease  Continue atorvastatin     5. HLP  Assessment / Plan  Currently on atorvastatin     6. Acute respiratory failure  Assessment / Plan  S/p thoracentesis (transudative) c/b lung empyema - management and supportive care per primary and pulmonary teams     7. DM2  Assessment / Plan  Management per primary team  Currently on ISS    Clinically Significant Risk Factors              # Hypoalbuminemia: Lowest albumin = 2.2 g/dL at 8/18/2024  7:04 AM, will monitor as appropriate   # Thrombocytopenia: Lowest platelets = 122 in last 2 days, will  monitor for bleeding   # Hypertension: Noted on problem list               # Financial/Environmental Concerns: none   # Pacemaker present        Cardiac Arrhythmia: Atrial fibrillation: Longstanding  Cardiomyopathy  Heart Valve Replacement  Combined acute    Fluid overload, unspecified, Other fluid overload, and Other disorders of electrolyte and fluid balance, not elsewhere classified    Pulmonary Heart Disease (Pulmonary hypertension or Cor pulmonale): Cor pulmonale    85 minutes spent reviewing prior records (including documentation, laboratory studies, cardiac testing/imaging), history and physical exam, planning, and subsequent documentation.    History of Present Illness/Subjective    Ms. Mary Kay Tejada is a 74 year old female with a PMHx significant for (per Epic notation) paroxysmal atrial flutter on apixaban, COPD, hyperlipidemia, diabetes mellitus type 2 with neuropathy, reflux, dizziness, tobacco use, fibromyalgia and chronic pain syndrome, prior pleural effusions and most recent admission in July found with E. coli pneumonia and a right sided pleural effusion which was consistent with a transudate and negative cultures who presents with dyspnea    Today, Mrs. Tejada reports progressive dyspnea x several days which prompted her to present for evaluation; Management plan as detailed above    ECG: Personally reviewed. nonspecific ST and T waves changes, atrial flutter, rate controlled.    ECHO (personnaly Reviewed on 9/3/24):   Left ventricular function is normal.The ejection fraction is 55-60%.  The right ventricle is mild to moderately dilated.  Moderately decreased right ventricular systolic function  The right atrium is moderately dilated.  There is a bioprosthetic aortic valve.  There is no paravalvular regurgitation present.  The study was technically difficult.    Telemetry: personally reviewed September 3, 2024; notable for aflutter     Lab results: personally reviewed September 3, 2024; notable  for elevated NTproBNP    Medical history and pertinent documents reviewed in Care Everywhere please where applicable see details above          Physical Examination Review of Systems   BP 93/51 (BP Location: Left arm, Patient Position: Semi-Man's, Cuff Size: Adult Small)   Pulse 85   Temp 97.7  F (36.5  C) (Oral)   Resp 22   Wt 66.1 kg (145 lb 11.6 oz)   LMP  (LMP Unknown)   SpO2 100%   BMI 24.25 kg/m    Body mass index is 24.25 kg/m .  Wt Readings from Last 3 Encounters:   09/02/24 66.1 kg (145 lb 11.6 oz)   08/16/24 72.2 kg (159 lb 1.6 oz)   07/15/24 65.3 kg (143 lb 14.4 oz)     General Appearance:   no distress, normal body habitus   ENT/Mouth: membranes moist, no oral lesions or bleeding gums.      EYES:  no scleral icterus, normal conjunctivae   Neck: no carotid bruits or thyromegaly   Chest/Lungs:   lungs are clear to auscultation, no rales or wheezing, equal chest wall expansion    Cardiovascular:   Irregular. Normal first and second heart sounds with no murmurs, rubs, or gallops; the carotid, radial and posterior tibial pulses are intact, + JVD and LE edema bilaterally    Abdomen:  no organomegaly, masses, bruits, or tenderness; bowel sounds are present   Extremities: no cyanosis or clubbing   Skin: no xanthelasma, warm.    Neurologic: NAD     Psychiatric: alert and oriented x3, calm     A complete 10 systems ROS was reviewed  And is negative except what is listed in the HPI.          Medical History  Surgical History Family History Social History   Past Medical History:   Diagnosis Date    Anemia     Aortic stenosis     Aortic valve disorder     Atrial fibrillation (H)     Atrial flutter (H)     Benign neoplasm of adenomatous polyp     large intestine     Chronic constipation     Chronic heart failure with preserved ejection fraction (H) 02/29/2024    Chronic pain syndrome     Congestive heart failure (H)     COPD (chronic obstructive pulmonary disease) (H)     Oxygen at night     Dependence on  supplemental oxygen     Oxygen at noc, during the day as needed    Depression     Diabetes mellitus (H)     Dry eye syndrome     Fibromyalgia     Ganglion     left wrist    GERD (gastroesophageal reflux disease)     Hyperlipidemia     Hypertension     Hypokalemia     Infective otitis externa, unspecified     Created by Conversion     Larynx edema     Lung disease     Malignant neoplasm of vulva (H)     Created by Conversion [facility] Annotation: Apr 17 2007  8:24AM - Pattimiguel aYusufe:  resection per Dr. Alfonso Mane 9/06;  Replacement Utility updated for latest IMO load    Medial epicondylitis     Onychomycosis     Osteoarthritis     Peptic ulcer     Polyneuropathy     Vulvar malignant neoplasm (H)     Past Surgical History:   Procedure Laterality Date    BIOPSY BREAST Right     BIOPSY BREAST Right 01/28/2015    BIOPSY BREAST Right 01/28/2015    Procedure: RIGHT BREAST BIOPSY AFTER WIRE LOCALIZATION AT 0940;  Surgeon: Renée Soriano MD;  Location: [facility] [place];  Service:     BIOPSY OF BREAST, INCISIONAL      Description: Incisional Breast Biopsy;  Recorded: 11/13/2007;  Comments: benign    COLONOSCOPY N/A 06/14/2019    Procedure: COLONOSCOPY;  Surgeon: Eduardo Mora MD;  Location: [facility] [place];  Service: Gastroenterology    CV CORONARY ANGIOGRAM N/A 02/08/2024    Procedure: CV CORONARY ANGIOGRAM;  Surgeon: Moises Valencia MD;  Location: [facility] CV    CV LEFT HEART CATH N/A 02/08/2024    Procedure: Left Heart Catheterization;  Surgeon: Moises Valencia MD;  Location: [facility] LAB CV    CV RIGHT HEART CATH MEASUREMENTS RECORDED N/A 02/08/2024    Procedure: Right Heart Catheterization;  Surgeon: Moises Valencia MD;  Location: [facility] CATH LAB CV    CV TRANSCATHETER AORTIC VALVE REPLACEMENT-FEMORAL APPROACH N/A 02/20/2024    Procedure: Transcatheter Aortic Valve Replacement, possible cardiopulmonary bypass, possible surgical intervention;  Surgeon: Moises Valencia  MD;  Location: [facility] CV    EP PACEMAKER DEVICE & IMPLANT- HIS LEAD DUAL N/A 3/7/2024    Procedure: Pacemaker Device & Lead Implant - HIS Lead Dual;  Surgeon: Donnell Leon MD;  Location: [facility] CV    ESOPHAGOSCOPY, GASTROSCOPY, DUODENOSCOPY (EGD), COMBINED N/A 11/06/2018    Procedure: ESOPHAGOGASTRODUODENOSCOPY;  Surgeon: Lit Fernando MD;  Location: [facility];  Service:     HYSTERECTOMY      JOINT REPLACEMENT Left     TKA    OR TRANSCATHETER AORTIC VALVE REPLACEMENT, FEMORAL PERCUTANEOUS APPROACH (STANDBY) N/A 02/20/2024    Procedure: OR TRANSCATHETER AORTIC VALVE REPLACEMENT, FEMORAL PERCUTANEOUS APPROACH (STANDBY);  Surgeon: Ishmael Farias MD;  Location: [facility] CV    PICC TRIPLE LUMEN PLACEMENT  01/12/2023         PICC TRIPLE LUMEN PLACEMENT  8/10/2024    RI ABLATE HEART DYSRHYTHM FOCUS      Description: Catheter Ablation Atrial Fibrillation;  Recorded: 07/31/2012;  Comments: 7/24/12 PVI with Dr. Bansal to all 5 pulm veins and CTI fl ablation line as well.    TRANSCATHETER AORTIC-VALVE REPLACEMENT      VIDEO-ASSISTED THORACOSCOPIC SURGERY (VATS) Right 8/19/2024    Procedure: VIDEO-ASSISTED RIGHT THORACOSCOPIC SURGERY WITH RIGHT PLEURAL FLUID CYTOLOGY;  Surgeon: Renée Soriano MD;  Location: [facility]    Z SUPRACERV ABD HYSTERECTOMY      Description: Supracervical Hysterectomy;  Proc Date: 01/01/1985;  Comments: some cervix left!; ovaries intact; done for bleeding    no family history of premature coronary artery disease Social History     Socioeconomic History    Marital status:      Spouse name: Not on file    Number of children: Not on file    Years of education: Not on file    Highest education level: Not on file   Occupational History    Not on file   Tobacco Use    Smoking status: Some Days     Current packs/day: 0.25     Types: Cigarettes     Passive exposure: Never    Smokeless tobacco: Never    Tobacco comments:      seen by TTS inpatient on 3/31/22   Vaping Use    Vaping status: Never Used   Substance and Sexual Activity    Alcohol use: Yes     Comment: Alcoholic Drinks/day: very little    Drug use: No    Sexual activity: Not on file   Other Topics Concern    Not on file   Social History Narrative    Not on file     Social Determinants of Health     Financial Resource Strain: Low Risk  (8/21/2024)    Financial Resource Strain     Within the past 12 months, have you or your family members you live with been unable to get utilities (heat, electricity) when it was really needed?: No   Food Insecurity: Low Risk  (8/21/2024)    Food Insecurity     Within the past 12 months, did you worry that your food would run out before you got money to buy more?: No     Within the past 12 months, did the food you bought just not last and you didn t have money to get more?: No   Transportation Needs: Low Risk  (8/21/2024)    Transportation Needs     Within the past 12 months, has lack of transportation kept you from medical appointments, getting your medicines, non-medical meetings or appointments, work, or from getting things that you need?: No   Physical Activity: Not on file   Stress: Not on file   Social Connections: Not on file   Interpersonal Safety: Low Risk  (8/21/2024)    Interpersonal Safety     Do you feel physically and emotionally safe where you currently live?: Yes     Within the past 12 months, have you been hit, slapped, kicked or otherwise physically hurt by someone?: No     Within the past 12 months, have you been humiliated or emotionally abused in other ways by your partner or ex-partner?: No   Housing Stability: Low Risk  (8/21/2024)    Housing Stability     Do you have housing? : Yes     Are you worried about losing your housing?: No           Lab Results    Chemistry/lipid CBC Cardiac Enzymes/BNP/TSH/INR   Lab Results   Component Value Date    CHOL 167 09/20/2023    HDL 75 09/20/2023    TRIG 83 09/20/2023    BUN 11.2  "09/03/2024     09/03/2024    CO2 39 (H) 09/03/2024    Lab Results   Component Value Date    WBC 2.5 (L) 09/03/2024    HGB 7.4 (L) 09/03/2024    HCT 26.2 (L) 09/03/2024    MCV 88 09/03/2024     (L) 09/03/2024    Lab Results   Component Value Date    TROPONINI 0.07 05/24/2023     (H) 06/02/2023    TSH 2.80 08/10/2024    INR 1.81 (H) 08/31/2024     Lab Results   Component Value Date    TROPONINI 0.07 05/24/2023          Weight:    Wt Readings from Last 3 Encounters:   09/02/24 66.1 kg (145 lb 11.6 oz)   08/16/24 72.2 kg (159 lb 1.6 oz)   07/15/24 65.3 kg (143 lb 14.4 oz)       Allergies  Allergies   Allergen Reactions    Celebrex [Celecoxib] Rash     patient had butterfly rash - \"lupus-like\"      Latex Rash         Surgical History  Past Surgical History:   Procedure Laterality Date    BIOPSY BREAST Right     BIOPSY BREAST Right 01/28/2015    BIOPSY BREAST Right 01/28/2015    Procedure: RIGHT BREAST BIOPSY AFTER WIRE LOCALIZATION AT 0940;  Surgeon: Renée Soriano MD;  Location: [facility] [place];  Service:     BIOPSY OF BREAST, INCISIONAL      Description: Incisional Breast Biopsy;  Recorded: 11/13/2007;  Comments: benign    COLONOSCOPY N/A 06/14/2019    Procedure: COLONOSCOPY;  Surgeon: Eduardo Mora MD;  Location: [facility] [place];  Service: Gastroenterology    CV CORONARY ANGIOGRAM N/A 02/08/2024    Procedure: CV CORONARY ANGIOGRAM;  Surgeon: Moises Valencia MD;  Location: [facility] CATH LAB CV    CV LEFT HEART CATH N/A 02/08/2024    Procedure: Left Heart Catheterization;  Surgeon: Moises Valencia MD;  Location: [facility] LAB CV    CV RIGHT HEART CATH MEASUREMENTS RECORDED N/A 02/08/2024    Procedure: Right Heart Catheterization;  Surgeon: Moises Valencia MD;  Location: [facility] CATH LAB CV    CV TRANSCATHETER AORTIC VALVE REPLACEMENT-FEMORAL APPROACH N/A 02/20/2024    Procedure: Transcatheter Aortic Valve Replacement, possible cardiopulmonary bypass, possible surgical " intervention;  Surgeon: Moises Valencia MD;  Location: [facility] CV    EP PACEMAKER DEVICE & IMPLANT- HIS LEAD DUAL N/A 3/7/2024    Procedure: Pacemaker Device & Lead Implant - HIS Lead Dual;  Surgeon: Donnell Leon MD;  Location: [facility] CV    ESOPHAGOSCOPY, GASTROSCOPY, DUODENOSCOPY (EGD), COMBINED N/A 11/06/2018    Procedure: ESOPHAGOGASTRODUODENOSCOPY;  Surgeon: Lit Fernando MD;  Location: [facility] [place];  Service:     HYSTERECTOMY      JOINT REPLACEMENT Left     TKA    OR TRANSCATHETER AORTIC VALVE REPLACEMENT, FEMORAL PERCUTANEOUS APPROACH (STANDBY) N/A 02/20/2024    Procedure: OR TRANSCATHETER AORTIC VALVE REPLACEMENT, FEMORAL PERCUTANEOUS APPROACH ([facility]);  Surgeon: Ishmael Farias MD;  Location: [facility] CV    PICC TRIPLE LUMEN PLACEMENT  01/12/2023         PICC TRIPLE LUMEN PLACEMENT  8/10/2024    ME ABLATE HEART DYSRHYTHM FOCUS      Description: Catheter Ablation Atrial Fibrillation;  Recorded: 07/31/2012;  Comments: 7/24/12 PVI with Dr. Bansal to all 5 pulm veins and CTI fl ablation line as well.    TRANSCATHETER AORTIC-VALVE REPLACEMENT      VIDEO-ASSISTED THORACOSCOPIC SURGERY (VATS) Right 8/19/2024    Procedure: VIDEO-ASSISTED RIGHT THORACOSCOPIC SURGERY WITH RIGHT PLEURAL FLUID CYTOLOGY;  Surgeon: Renée Soriano MD;  Location: [facility]    Z SUPRACERV ABD HYSTERECTOMY      Description: Supracervical Hysterectomy;  Proc Date: 01/01/1985;  Comments: some cervix left!; ovaries intact; done for bleeding       Social History  Tobacco:   History   Smoking Status    Some Days    Types: Cigarettes   Smokeless Tobacco    Never    Alcohol:   Social History    Substance and Sexual Activity      Alcohol use: Yes        Comment: Alcoholic Drinks/day: very little   Illicit Drugs:   History   Drug Use No       Family History  Family History   Problem Relation Age of Onset    Heart Failure Mother     Cancer Other         paternal  HX-laryngeal     Alcoholism Sister     No Known Problems Daughter     No Known Problems Maternal Grandmother     No Known Problems Maternal Grandfather     No Known Problems Paternal Grandmother     No Known Problems Paternal Grandfather     No Known Problems Maternal Aunt     No Known Problems Paternal Aunt     Alcoholism Sister     Alcoholism Brother     Alcoholism Father     Cancer Paternal Uncle         Gastric-Alcohol    Cancer Paternal Uncle         gastric-Alcohol    Hereditary Breast and Ovarian Cancer Syndrome No family hx of     Breast Cancer No family hx of     Colon Cancer No family hx of     Endometrial Cancer No family hx of     Ovarian Cancer No family hx of           Dedra-Fatou Robert MD on 9/3/2024      cc: Cammy Bui,

## 2024-09-03 NOTE — PLAN OF CARE
Problem: Fluid Volume Excess  Goal: Fluid Balance  Outcome: Progressing   Goal Outcome Evaluation:         Patient transferred to room 306. First bag of IV Albumin given. Lasix order modified due to frequent low BP. Report given to P3 RN.

## 2024-09-03 NOTE — PLAN OF CARE
Problem: Adult Inpatient Plan of Care  Goal: Absence of Hospital-Acquired Illness or Injury  Intervention: Prevent Skin Injury  Recent Flowsheet Documentation  Taken 9/2/2024 1649 by Huong Frias RN  Body Position: supine  Intervention: Prevent and Manage VTE (Venous Thromboembolism) Risk  Recent Flowsheet Documentation  Taken 9/2/2024 1600 by Huong Frias RN  VTE Prevention/Management: SCDs on (sequential compression devices)  Intervention: Prevent Infection  Recent Flowsheet Documentation  Taken 9/2/2024 1600 by Huong Frias RN  Infection Prevention: hand hygiene promoted  Goal: Optimal Comfort and Wellbeing  Intervention: Monitor Pain and Promote Comfort  Recent Flowsheet Documentation  Taken 9/2/2024 1600 by Huong Frias RN  Pain Management Interventions: medication (see MAR)  Intervention: Provide Person-Centered Care  Recent Flowsheet Documentation  Taken 9/2/2024 1600 by Huong Frias RN  Trust Relationship/Rapport:   care explained   choices provided   emotional support provided     Problem: Gas Exchange Impaired  Goal: Optimal Gas Exchange  Intervention: Optimize Oxygenation and Ventilation  Recent Flowsheet Documentation  Taken 9/2/2024 1649 by Huong Frias RN  Head of Bed (HOB) Positioning: HOB at 20-30 degrees     Problem: Dysrhythmia  Goal: Normalized Cardiac Rhythm  Intervention: Monitor and Manage Cardiac Rhythm Effect  Recent Flowsheet Documentation  Taken 9/2/2024 1600 by Huong Frias RN  VTE Prevention/Management: SCDs on (sequential compression devices)     Problem: Comorbidity Management  Goal: Blood Glucose Levels Within Targeted Range  Intervention: Monitor and Manage Glycemia  Recent Flowsheet Documentation  Taken 9/2/2024 1600 by Huong Frias RN  Glycemic Management: blood glucose monitored     Problem: Risk for Delirium  Goal: Improved Behavioral Control  Intervention: Minimize Safety Risk  Recent Flowsheet Documentation  Taken 9/2/2024 1600 by Huong Frias  TYESHA, RN  Communication Enhancement Strategies: call light answered in person  Trust Relationship/Rapport:   care explained   choices provided   emotional support provided  Goal: Improved Attention and Thought Clarity  Intervention: Maximize Cognitive Function  Recent Flowsheet Documentation  Taken 9/2/2024 1600 by Huong Frias RN  Reorientation Measures: clock in view     Problem: Skin Injury Risk Increased  Goal: Skin Health and Integrity  Intervention: Plan: Nurse Driven Intervention: Moisture Management  Recent Flowsheet Documentation  Taken 9/2/2024 2050 by Huong Frias RN  Bathing/Skin Care: wipes, CHG  Intervention: Optimize Skin Protection  Recent Flowsheet Documentation  Taken 9/2/2024 1649 by Huong Frias RN  Head of Bed ([facility]) Positioning: [facility] at 20-30 degrees  Intervention: Promote and Optimize Oral Intake  Recent Flowsheet Documentation  Taken 9/2/2024 1600 by Huong Frias RN  Oral Nutrition Promotion: rest periods promoted     Problem: Mobility Impairment  Goal: Optimal Mobility  Intervention: Optimize Mobility  Recent Flowsheet Documentation  Taken 9/2/2024 1649 by Huong Frias RN  Positioning/Transfer Devices:   pillows   in use     Problem: Gas Exchange Impaired  Goal: Optimal Gas Exchange  Intervention: Optimize Oxygenation and Ventilation  Recent Flowsheet Documentation  Taken 9/2/2024 1649 by Huong Frias RN  Head of Bed ([facility]) Positioning: [facility] at 20-30 degrees     Problem: Skin Injury Risk Increased  Goal: Skin Health and Integrity  Intervention: Plan: Nurse Driven Intervention: Moisture Management  Recent Flowsheet Documentation  Taken 9/2/2024 2050 by Huong Frias RN  Bathing/Skin Care: wipes, CHG  Intervention: Optimize Skin Protection  Recent Flowsheet Documentation  Taken 9/2/2024 1649 by Huong Frias RN  Head of Bed ([facility]) Positioning: [facility] at 20-30 degrees  Intervention: Promote and Optimize Oral Intake  Recent Flowsheet Documentation  Taken 9/2/2024 1600 by  Binda, Huong K, RN  Oral Nutrition Promotion: rest periods promoted     Problem: Mobility Impairment  Goal: Optimal Mobility  Intervention: Optimize Mobility  Recent Flowsheet Documentation  Taken 9/2/2024 1649 by Huong Frias, RN  Positioning/Transfer Devices:   pillows   in use   Goal Outcome Evaluation:       Pt alert and oriented, denies pain, sating 94% on 3L O2 NC.  & 250, got coverage. Tolerating oral well.

## 2024-09-03 NOTE — CONSULTS
[facility]  WO Nurse Inpatient Assessment     Consulted for: sacrum    Patient History (according to provider note(s):      Per H+P:  74 year old female admitted on 8/10/2024. She has a pmhx of paroxysmal atrial flutter on apixaban, COPD, hyperlipidemia, diabetes mellitus type 2 with neuropathy, reflux, dizziness, tobacco use, fibromyalgia and chronic pain syndrome, prior pleural effusions and most recent admission in July found with E. coli pneumonia and a right sided pleural effusion which was consistent with a transudate and negative cultures.      Assessment:      Areas visualized during today's visit: Sacrum/coccyx        Several areas of blanchable erythema, no open areas. Applied a Mepilex for offloading.     Treatment Plan:     Sacrum Apply  Mepilex dressing for pressure ulcer prevention. Lift up q shift to assess site but change dressing q 3 days.  No current open area noted if any change contact the WO service to update on a change in status.      Orders: Written    RECOMMEND PRIMARY TEAM ORDER: None, at this time  Education provided: plan of care  Discussed plan of care with: Patient and Nurse  WO nurse follow-up plan: weekly  Notify WOC if wound(s) deteriorate.  Nursing to notify the Provider(s) and re-consult the WOC Nurse if new skin concern.    DATA:     Current support surface: Standard  Standard gel mattress (Isoflex)  Containment of urine/stool: Suction based external urinary catheter   BMI: Body mass index is 24.25 kg/m .   Active diet order: Orders Placed This Encounter      Regular Diet Adult     Output: I/O last 3 completed shifts:  In: 240 [P.O.:240]  Out: 400 [Urine:400]     Labs:   Recent Labs   Lab 09/03/24  0547 09/01/24  0641 08/31/24  1028   HGB 7.4*   < >  --    INR  --   --  1.81*   WBC 2.5*   < >  --     < > = values in this interval not displayed.     Pressure injury risk assessment:   Sensory Perception: 3-->slightly limited  Moisture: 3-->occasionally  moist  Activity: 2-->chairfast  Mobility: 3-->slightly limited  Nutrition: 3-->adequate  Friction and Shear: 2-->potential problem  Toby Score: 16    MELISSA JohnsonN, RN, PHN, HNB-BC, CWOCN  Pager no longer is use, please contact through [employer] group: [facility] [employer]

## 2024-09-03 NOTE — PROGRESS NOTES
"CLINICAL NUTRITION SERVICES - ASSESSMENT NOTE     Nutrition Prescription    RECOMMENDATIONS FOR MDs/PROVIDERS TO ORDER:  none    Malnutrition Status:    Limited data     Recommendations already ordered by Registered Dietitian (RD):  Start Glucerna daily     Future/Additional Recommendations:  Will monitor progress towards goals     REASON FOR ASSESSMENT  Mary Kay Tejada is a/an 74 year old female assessed by the dietitian for LOS/wounds noted     Pertinent Medical Admission/History: acute hypoxic respiratory failure, afib, DM2, hyperlipidemia, sacral erythema, perianal skin breakdown    NUTRITION HISTORY  Allergies: NKFA  RD working remote, RD attempted to reach pt but was unsuccessful.   Pt lives at home with adult children  Per previous RD notes pt has drank Ensure in the past but that was a few years ago.     CURRENT NUTRITION ORDERS  Diet: Regular  Intake/Tolerance: on 9/2 pt ordered 2061kcal and 71g protein with % intake   On 9/1 pt only ordered 2 meals worth 828kcal and 35g protein with 75% intake   Pt is not quite meeting healing needs     PHYSICAL FINDINGS  See malnutrition section below.  Per flowsheet:  Edema- +2 generalized   GI- WDL  Skin- moisture damage wound on perineum, stage 1 coccyx wound; WOC consulted   Pain- denies  Oral- Pt wears dentures, unknown if has chewing issues     LABS  Labs reviewed, Cr-.49, meter glucose-127, 135    MEDICATIONS  Medications reviewed, lasix, novolog, protonix, senokot    ANTHROPOMETRICS  Height:  5'5\" (165.1cm)  Most Recent Weight: 66.1 kg (145 lb 11.6 oz)    IBW: 57 kg  BMI: Normal BMI  Weight History:   Wt Readings from Last 20 Encounters:   09/02/24 66.1 kg (145 lb 11.6 oz)   08/16/24 72.2 kg (159 lb 1.6 oz)   07/15/24 65.3 kg (143 lb 14.4 oz)   05/22/24 66.2 kg (146 lb)   04/29/24 65.8 kg (145 lb)   04/24/24 66.7 kg (147 lb)   03/05/24 60.8 kg (134 lb)   02/29/24 59.4 kg (131 lb)   02/23/24 59.6 kg (131 lb 6.4 oz)   02/19/24 59 kg (130 lb)   02/10/24 59.6 " kg (131 lb 8 oz)   01/29/24 54.9 kg (121 lb)   09/20/23 54.9 kg (121 lb)   08/16/23 53.1 kg (117 lb)   08/03/23 59.9 kg (132 lb)   06/30/23 58.5 kg (128 lb 14.4 oz)   06/05/23 65.9 kg (145 lb 4.8 oz)   02/09/23 67 kg (147 lb 11.3 oz)   02/06/23 65.3 kg (144 lb)   01/19/23 65.7 kg (144 lb 14.4 oz)           ASSESSED NUTRITION NEEDS  Dosing Weight: 66 kg  Estimated Energy Needs: 9431-1330 kcals/day (25 - 30 kcals/kg)  Justification: Maintenance  Estimated Protein Needs: 79-99 grams protein/day (1.2 - 1.5 grams of pro/kg)  Justification: Wound healing  Estimated Fluid Needs: 9519-5445 mL/day (25 - 30 mL/kg)   Justification: Maintenance      MALNUTRITION:  % Weight Loss:  None noted  % Intake:  No decreased intake noted  Subcutaneous Fat Loss:  unable to assess as RD is remote  Muscle Loss:  unable to assess  Fluid Retention:  Moderate +2 generalized     Malnutrition Diagnosis: limited data due to being remote, but does not appear to be malnourished     NUTRITION DIAGNOSIS  Increased nutrient needs  related to wound as evidenced by stage 1 coccyx wound and moisture damage injuries       INTERVENTIONS  Implementation  Trial a Glucerna daily to better meet protein needs for healing   Education Needs- none noted     Goals  Patient to consume % of nutritionally adequate meals three times per day, or the equivalent with supplements/snacks.  Pt to consume a minimum of 79g protein a day to promote healing      Monitoring/Evaluation  Will monitor progress towards goals including po, supplement tolerance, healing

## 2024-09-03 NOTE — PLAN OF CARE
Problem: Gas Exchange Impaired  Goal: Optimal Gas Exchange  Outcome: Progressing  Intervention: Optimize Oxygenation and Ventilation  Recent Flowsheet Documentation  Taken 9/3/2024 0800 by Kizzy Lazar RN  Head of Bed (HOB) Positioning: HOB at 20-30 degrees     Problem: Comorbidity Management  Goal: Blood Glucose Levels Within Targeted Range  Outcome: Progressing  Intervention: Monitor and Manage Glycemia  Recent Flowsheet Documentation  Taken 9/3/2024 0800 by Kizzy Lazar RN  Glycemic Management: blood glucose monitored  Medication Review/Management: medications reviewed     Problem: Mobility Impairment  Goal: Optimal Mobility  Outcome: Progressing  Intervention: Optimize Mobility  Recent Flowsheet Documentation  Taken 9/3/2024 0800 by Kizzy Lazar RN  Activity Management: activity adjusted per tolerance  Positioning/Transfer Devices:   pillows   in use   Goal Outcome Evaluation:  Patient tired today. Lungs decreased and right side lower lobes crackles. Short of breath with activity. O2 at 3L per nasal canula saturation 94%. Increase in edema in lower torso and hip area. Metoprolol held this am per parameters . Dr Dutta notified. Continue to monitor respiratory status. Patient complaining of buttocks pain. WOC was consulted by  2 days ago. Nurse texted WOC today to see pt. Area is soft and reddened and painful to touch. No open area on buttocks. Continue to monitor. Patient educated and assisted to turn side to side.

## 2024-09-04 ENCOUNTER — APPOINTMENT (OUTPATIENT)
Dept: RADIOLOGY | Facility: HOSPITAL | Age: 74
DRG: 166 | End: 2024-09-04
Attending: HOSPITALIST
Payer: COMMERCIAL

## 2024-09-04 LAB
ABO/RH(D): NORMAL
ANION GAP SERPL CALCULATED.3IONS-SCNC: 5 MMOL/L (ref 7–15)
ANTIBODY SCREEN: NEGATIVE
ATRIAL RATE - MUSE: 120 BPM
ATRIAL RATE - MUSE: 300 BPM
BLD PROD TYP BPU: NORMAL
BLOOD COMPONENT TYPE: NORMAL
BUN SERPL-MCNC: 10.5 MG/DL (ref 8–23)
CALCIUM SERPL-MCNC: 8.5 MG/DL (ref 8.8–10.4)
CHLORIDE SERPL-SCNC: 99 MMOL/L (ref 98–107)
CODING SYSTEM: NORMAL
CREAT SERPL-MCNC: 0.48 MG/DL (ref 0.51–0.95)
CROSSMATCH: NORMAL
DIASTOLIC BLOOD PRESSURE - MUSE: NORMAL MMHG
DIASTOLIC BLOOD PRESSURE - MUSE: NORMAL MMHG
EGFRCR SERPLBLD CKD-EPI 2021: >90 ML/MIN/1.73M2
ERYTHROCYTE [DISTWIDTH] IN BLOOD BY AUTOMATED COUNT: 20.7 % (ref 10–15)
GLUCOSE BLDC GLUCOMTR-MCNC: 105 MG/DL (ref 70–99)
GLUCOSE BLDC GLUCOMTR-MCNC: 116 MG/DL (ref 70–99)
GLUCOSE BLDC GLUCOMTR-MCNC: 125 MG/DL (ref 70–99)
GLUCOSE BLDC GLUCOMTR-MCNC: 126 MG/DL (ref 70–99)
GLUCOSE SERPL-MCNC: 144 MG/DL (ref 70–99)
HCO3 SERPL-SCNC: 38 MMOL/L (ref 22–29)
HCT VFR BLD AUTO: 24.4 % (ref 35–47)
HGB BLD-MCNC: 6.8 G/DL (ref 11.7–15.7)
INTERPRETATION ECG - MUSE: NORMAL
INTERPRETATION ECG - MUSE: NORMAL
ISSUE DATE AND TIME: NORMAL
MAGNESIUM SERPL-MCNC: 1.8 MG/DL (ref 1.7–2.3)
MCH RBC QN AUTO: 24.7 PG (ref 26.5–33)
MCHC RBC AUTO-ENTMCNC: 27.9 G/DL (ref 31.5–36.5)
MCV RBC AUTO: 89 FL (ref 78–100)
P AXIS - MUSE: NORMAL DEGREES
P AXIS - MUSE: NORMAL DEGREES
PATH REPORT.COMMENTS IMP SPEC: NORMAL
PATH REPORT.FINAL DX SPEC: NORMAL
PATH REPORT.GROSS SPEC: NORMAL
PATH REPORT.MICROSCOPIC SPEC OTHER STN: NORMAL
PLATELET # BLD AUTO: 105 10E3/UL (ref 150–450)
POTASSIUM SERPL-SCNC: 4.1 MMOL/L (ref 3.4–5.3)
PR INTERVAL - MUSE: NORMAL MS
PR INTERVAL - MUSE: NORMAL MS
QRS DURATION - MUSE: 90 MS
QRS DURATION - MUSE: 94 MS
QT - MUSE: 284 MS
QT - MUSE: 302 MS
QTC - MUSE: 420 MS
QTC - MUSE: 478 MS
R AXIS - MUSE: -67 DEGREES
R AXIS - MUSE: -70 DEGREES
RBC # BLD AUTO: 2.75 10E6/UL (ref 3.8–5.2)
SODIUM SERPL-SCNC: 142 MMOL/L (ref 135–145)
SPECIMEN EXPIRATION DATE: NORMAL
SYSTOLIC BLOOD PRESSURE - MUSE: NORMAL MMHG
SYSTOLIC BLOOD PRESSURE - MUSE: NORMAL MMHG
T AXIS - MUSE: 118 DEGREES
T AXIS - MUSE: 126 DEGREES
UNIT ABO/RH: NORMAL
UNIT NUMBER: NORMAL
UNIT STATUS: NORMAL
UNIT TYPE ISBT: 5100
VENTRICULAR RATE- MUSE: 132 BPM
VENTRICULAR RATE- MUSE: 151 BPM
WBC # BLD AUTO: 1.6 10E3/UL (ref 4–11)

## 2024-09-04 PROCEDURE — 250N000013 HC RX MED GY IP 250 OP 250 PS 637: Performed by: SPECIALIST

## 2024-09-04 PROCEDURE — 86923 COMPATIBILITY TEST ELECTRIC: CPT | Performed by: STUDENT IN AN ORGANIZED HEALTH CARE EDUCATION/TRAINING PROGRAM

## 2024-09-04 PROCEDURE — P9047 ALBUMIN (HUMAN), 25%, 50ML: HCPCS | Performed by: INTERNAL MEDICINE

## 2024-09-04 PROCEDURE — 999N000157 HC STATISTIC RCP TIME EA 10 MIN

## 2024-09-04 PROCEDURE — 88305 TISSUE EXAM BY PATHOLOGIST: CPT | Mod: 26 | Performed by: PATHOLOGY

## 2024-09-04 PROCEDURE — 99222 1ST HOSP IP/OBS MODERATE 55: CPT | Performed by: INTERNAL MEDICINE

## 2024-09-04 PROCEDURE — 71045 X-RAY EXAM CHEST 1 VIEW: CPT

## 2024-09-04 PROCEDURE — 86900 BLOOD TYPING SEROLOGIC ABO: CPT | Performed by: HOSPITALIST

## 2024-09-04 PROCEDURE — 93005 ELECTROCARDIOGRAM TRACING: CPT | Performed by: HOSPITALIST

## 2024-09-04 PROCEDURE — 93010 ELECTROCARDIOGRAM REPORT: CPT | Mod: 77 | Performed by: INTERNAL MEDICINE

## 2024-09-04 PROCEDURE — 250N000013 HC RX MED GY IP 250 OP 250 PS 637: Performed by: STUDENT IN AN ORGANIZED HEALTH CARE EDUCATION/TRAINING PROGRAM

## 2024-09-04 PROCEDURE — 250N000009 HC RX 250: Performed by: INTERNAL MEDICINE

## 2024-09-04 PROCEDURE — 250N000011 HC RX IP 250 OP 636: Performed by: INTERNAL MEDICINE

## 2024-09-04 PROCEDURE — 99233 SBSQ HOSP IP/OBS HIGH 50: CPT | Performed by: INTERNAL MEDICINE

## 2024-09-04 PROCEDURE — 83735 ASSAY OF MAGNESIUM: CPT | Performed by: INTERNAL MEDICINE

## 2024-09-04 PROCEDURE — 999N000156 HC STATISTIC RCP CONSULT EA 30 MIN

## 2024-09-04 PROCEDURE — 250N000011 HC RX IP 250 OP 636: Performed by: HOSPITALIST

## 2024-09-04 PROCEDURE — 93005 ELECTROCARDIOGRAM TRACING: CPT

## 2024-09-04 PROCEDURE — 93005 ELECTROCARDIOGRAM TRACING: CPT | Performed by: INTERNAL MEDICINE

## 2024-09-04 PROCEDURE — 250N000013 HC RX MED GY IP 250 OP 250 PS 637: Performed by: FAMILY MEDICINE

## 2024-09-04 PROCEDURE — 85027 COMPLETE CBC AUTOMATED: CPT | Performed by: INTERNAL MEDICINE

## 2024-09-04 PROCEDURE — 93010 ELECTROCARDIOGRAM REPORT: CPT | Performed by: STUDENT IN AN ORGANIZED HEALTH CARE EDUCATION/TRAINING PROGRAM

## 2024-09-04 PROCEDURE — 94640 AIRWAY INHALATION TREATMENT: CPT | Mod: 76

## 2024-09-04 PROCEDURE — 250N000013 HC RX MED GY IP 250 OP 250 PS 637: Performed by: INTERNAL MEDICINE

## 2024-09-04 PROCEDURE — 258N000003 HC RX IP 258 OP 636: Performed by: INTERNAL MEDICINE

## 2024-09-04 PROCEDURE — 250N000009 HC RX 250

## 2024-09-04 PROCEDURE — 120N000004 HC R&B MS OVERFLOW

## 2024-09-04 PROCEDURE — 88112 CYTOPATH CELL ENHANCE TECH: CPT | Mod: 26 | Performed by: PATHOLOGY

## 2024-09-04 PROCEDURE — G0463 HOSPITAL OUTPT CLINIC VISIT: HCPCS | Mod: 25

## 2024-09-04 PROCEDURE — 86901 BLOOD TYPING SEROLOGIC RH(D): CPT | Performed by: HOSPITALIST

## 2024-09-04 PROCEDURE — 80048 BASIC METABOLIC PNL TOTAL CA: CPT | Performed by: SPECIALIST

## 2024-09-04 PROCEDURE — P9016 RBC LEUKOCYTES REDUCED: HCPCS | Performed by: STUDENT IN AN ORGANIZED HEALTH CARE EDUCATION/TRAINING PROGRAM

## 2024-09-04 PROCEDURE — 99291 CRITICAL CARE FIRST HOUR: CPT | Performed by: HOSPITALIST

## 2024-09-04 PROCEDURE — 94640 AIRWAY INHALATION TREATMENT: CPT

## 2024-09-04 RX ORDER — METOPROLOL TARTRATE 1 MG/ML
5 INJECTION, SOLUTION INTRAVENOUS EVERY 5 MIN PRN
Status: DISCONTINUED | OUTPATIENT
Start: 2024-09-04 | End: 2024-09-04

## 2024-09-04 RX ORDER — FUROSEMIDE 10 MG/ML
40 INJECTION INTRAMUSCULAR; INTRAVENOUS EVERY 8 HOURS
Status: DISCONTINUED | OUTPATIENT
Start: 2024-09-04 | End: 2024-09-04

## 2024-09-04 RX ORDER — ACETAZOLAMIDE 500 MG/5ML
500 INJECTION, POWDER, LYOPHILIZED, FOR SOLUTION INTRAVENOUS EVERY 12 HOURS
Status: COMPLETED | OUTPATIENT
Start: 2024-09-04 | End: 2024-09-05

## 2024-09-04 RX ORDER — DIGOXIN 0.25 MG/ML
250 INJECTION INTRAMUSCULAR; INTRAVENOUS ONCE
Status: COMPLETED | OUTPATIENT
Start: 2024-09-04 | End: 2024-09-04

## 2024-09-04 RX ORDER — METOLAZONE 5 MG/1
5 TABLET ORAL DAILY
Status: DISCONTINUED | OUTPATIENT
Start: 2024-09-04 | End: 2024-09-05

## 2024-09-04 RX ORDER — ALBUMIN (HUMAN) 12.5 G/50ML
50 SOLUTION INTRAVENOUS ONCE
Status: COMPLETED | OUTPATIENT
Start: 2024-09-04 | End: 2024-09-04

## 2024-09-04 RX ORDER — FUROSEMIDE 10 MG/ML
40 INJECTION INTRAMUSCULAR; INTRAVENOUS ONCE
Status: COMPLETED | OUTPATIENT
Start: 2024-09-04 | End: 2024-09-04

## 2024-09-04 RX ADMIN — APIXABAN 5 MG: 5 TABLET, FILM COATED ORAL at 20:23

## 2024-09-04 RX ADMIN — ANORECTAL OINTMENT: 15.7; .44; 24; 20.6 OINTMENT TOPICAL at 15:18

## 2024-09-04 RX ADMIN — FUROSEMIDE 15 MG/HR: 10 INJECTION, SOLUTION INTRAVENOUS at 21:24

## 2024-09-04 RX ADMIN — ATORVASTATIN CALCIUM 20 MG: 10 TABLET, FILM COATED ORAL at 21:24

## 2024-09-04 RX ADMIN — IPRATROPIUM BROMIDE 0.5 MG: 0.5 SOLUTION RESPIRATORY (INHALATION) at 08:22

## 2024-09-04 RX ADMIN — DILTIAZEM HYDROCHLORIDE 120 MG: 120 CAPSULE, EXTENDED RELEASE ORAL at 06:38

## 2024-09-04 RX ADMIN — SENNOSIDES AND DOCUSATE SODIUM 1 TABLET: 8.6; 5 TABLET ORAL at 20:23

## 2024-09-04 RX ADMIN — Medication 1 SPRAY: at 20:24

## 2024-09-04 RX ADMIN — SUCRALFATE 1 G: 1 TABLET ORAL at 16:19

## 2024-09-04 RX ADMIN — ACETAZOLAMIDE 500 MG: 500 INJECTION, POWDER, LYOPHILIZED, FOR SOLUTION INTRAVENOUS at 16:05

## 2024-09-04 RX ADMIN — IPRATROPIUM BROMIDE 0.5 MG: 0.5 SOLUTION RESPIRATORY (INHALATION) at 15:46

## 2024-09-04 RX ADMIN — LEVALBUTEROL HYDROCHLORIDE 1.25 MG: 1.25 SOLUTION RESPIRATORY (INHALATION) at 08:22

## 2024-09-04 RX ADMIN — ACETAMINOPHEN 650 MG: 325 TABLET, FILM COATED ORAL at 05:45

## 2024-09-04 RX ADMIN — Medication 1 SPRAY: at 14:26

## 2024-09-04 RX ADMIN — FUROSEMIDE 15 MG/HR: 10 INJECTION, SOLUTION INTRAVENOUS at 16:06

## 2024-09-04 RX ADMIN — ACETYLCYSTEINE 2 ML: 200 SOLUTION ORAL; RESPIRATORY (INHALATION) at 08:22

## 2024-09-04 RX ADMIN — GABAPENTIN 600 MG: 300 CAPSULE ORAL at 14:26

## 2024-09-04 RX ADMIN — LEVALBUTEROL HYDROCHLORIDE 1.25 MG: 1.25 SOLUTION RESPIRATORY (INHALATION) at 15:45

## 2024-09-04 RX ADMIN — SUCRALFATE 1 G: 1 TABLET ORAL at 11:43

## 2024-09-04 RX ADMIN — SODIUM CHLORIDE SOLN NEBU 3% 3 ML: 3 NEBU SOLN at 20:56

## 2024-09-04 RX ADMIN — GABAPENTIN 600 MG: 300 CAPSULE ORAL at 20:22

## 2024-09-04 RX ADMIN — ALBUMIN HUMAN 50 G: 0.25 SOLUTION INTRAVENOUS at 11:06

## 2024-09-04 RX ADMIN — AMIODARONE HYDROCHLORIDE 150 MG: 1.5 INJECTION, SOLUTION INTRAVENOUS at 09:14

## 2024-09-04 RX ADMIN — Medication 1 SPRAY: at 18:06

## 2024-09-04 RX ADMIN — DIGOXIN 250 MCG: 0.25 INJECTION INTRAMUSCULAR; INTRAVENOUS at 14:25

## 2024-09-04 RX ADMIN — DIGOXIN 125 MCG: 125 TABLET ORAL at 06:38

## 2024-09-04 RX ADMIN — SODIUM CHLORIDE SOLN NEBU 3% 3 ML: 3 NEBU SOLN at 15:53

## 2024-09-04 RX ADMIN — LIDOCAINE 1 PATCH: 4 PATCH TOPICAL at 20:23

## 2024-09-04 RX ADMIN — FLUTICASONE FUROATE AND VILANTEROL TRIFENATATE 1 PUFF: 200; 25 POWDER RESPIRATORY (INHALATION) at 08:12

## 2024-09-04 RX ADMIN — FUROSEMIDE 40 MG: 10 INJECTION, SOLUTION INTRAMUSCULAR; INTRAVENOUS at 13:50

## 2024-09-04 RX ADMIN — SODIUM CHLORIDE SOLN NEBU 3% 3 ML: 3 NEBU SOLN at 08:22

## 2024-09-04 RX ADMIN — CEFTRIAXONE SODIUM 2 G: 2 INJECTION, POWDER, FOR SOLUTION INTRAMUSCULAR; INTRAVENOUS at 14:05

## 2024-09-04 RX ADMIN — APIXABAN 5 MG: 5 TABLET, FILM COATED ORAL at 08:14

## 2024-09-04 RX ADMIN — ACETYLCYSTEINE 2 ML: 200 SOLUTION ORAL; RESPIRATORY (INHALATION) at 20:57

## 2024-09-04 RX ADMIN — METOLAZONE 5 MG: 5 TABLET ORAL at 16:22

## 2024-09-04 RX ADMIN — ACETYLCYSTEINE 2 ML: 200 SOLUTION ORAL; RESPIRATORY (INHALATION) at 15:47

## 2024-09-04 RX ADMIN — PANTOPRAZOLE SODIUM 40 MG: 40 TABLET, DELAYED RELEASE ORAL at 08:13

## 2024-09-04 RX ADMIN — LEVALBUTEROL HYDROCHLORIDE 1.25 MG: 1.25 SOLUTION RESPIRATORY (INHALATION) at 20:54

## 2024-09-04 RX ADMIN — GABAPENTIN 600 MG: 300 CAPSULE ORAL at 08:13

## 2024-09-04 RX ADMIN — IPRATROPIUM BROMIDE 0.5 MG: 0.5 SOLUTION RESPIRATORY (INHALATION) at 20:55

## 2024-09-04 RX ADMIN — SUCRALFATE 1 G: 1 TABLET ORAL at 08:13

## 2024-09-04 ASSESSMENT — ACTIVITIES OF DAILY LIVING (ADL)
ADLS_ACUITY_SCORE: 51

## 2024-09-04 NOTE — PROGRESS NOTES
RESPIRATORY CARE NOTE     Patient declined Marjan, RT notified pulmonary MD.     Sanam Membreno, RT

## 2024-09-04 NOTE — PROGRESS NOTES
SPIRITUAL HEALTH SERVICES Progress Note  [facility], P3     responded to rapid response code. Medical staff attending to patient; no visitors present at this time.    Plan of Care - A  will continue to visit as able or per request by patient/family/staff.      Gilbert Albright MDiv, [facility]  /Manager [facility]  [phone]       Spiritual Health Services is available 24/7 for emergent requests and consults, either by paging the on-call  or by entering an ASAP/STAT consult in [employer], which will also page the on-call .

## 2024-09-04 NOTE — CONSULTS
See our daily pulmonary progress notes.    Addy (Nelson) MD Zuleyka  [facility] Pulmonary & Critical Care (Formerly [facility])  Send me a secure message using [employer]  Clinic [phone]  Fax [phone]

## 2024-09-04 NOTE — PROGRESS NOTES
[facility]  WO Nurse Inpatient Assessment     Consulted for: sacrum    Patient History (according to provider note(s):      Per H+P:  74 year old female admitted on 8/10/2024. She has a pmhx of paroxysmal atrial flutter on apixaban, COPD, hyperlipidemia, diabetes mellitus type 2 with neuropathy, reflux, dizziness, tobacco use, fibromyalgia and chronic pain syndrome, prior pleural effusions and most recent admission in July found with E. coli pneumonia and a right sided pleural effusion which was consistent with a transudate and negative cultures.      Assessment:      Areas visualized during today's visit: Sacrum/coccyx  Small area of moisture damage at 12 o'clock on anus. Total affected area is approximately 1.5 cm x 0.5 cm.   Other areas are pink and intact.    Treatment Plan:     Sacrum Apply  Mepilex dressing for pressure ulcer prevention. Lift up q shift to assess site but change dressing q 3 days.  Continue Calmoseptine as ordered by provider.    Orders: Reviewed    RECOMMEND PRIMARY TEAM ORDER: None, at this time  Education provided: plan of care  Discussed plan of care with: Patient and Nurse  WO nurse follow-up plan: weekly  Notify WOC if wound(s) deteriorate.  Nursing to notify the Provider(s) and re-consult the WOC Nurse if new skin concern.    DATA:     Current support surface: Standard  Standard gel mattress (Isoflex)  Containment of urine/stool: Suction based external urinary catheter   BMI: Body mass index is 24.1 kg/m .   Active diet order: Orders Placed This Encounter      Regular Diet Adult     Output: I/O last 3 completed shifts:  In: 360 [P.O.:360]  Out: 550 [Urine:550]     Labs:   Recent Labs   Lab 09/04/24  0539 09/01/24  0641 08/31/24  1028   HGB 6.8*   < >  --    INR  --   --  1.81*   WBC 1.6*   < >  --     < > = values in this interval not displayed.     Pressure injury risk assessment:   Sensory Perception: 3-->slightly limited  Moisture: 3-->occasionally  moist  Activity: 2-->chairfast  Mobility: 3-->slightly limited  Nutrition: 3-->adequate  Friction and Shear: 2-->potential problem  Toby Score: 16    Darlyn Mensah, MSN RN CWOCN  Pager no longer is use, please contact through apprupt group: [facility] [employer] [facility]

## 2024-09-04 NOTE — PROGRESS NOTES
RESPIRATORY CARE NOTE     Rapid called due to increase 02 needs and work of breathing. MD at bedside and assessing, pulmonary at bedside also. Lasix given, chest x-ray completed, bipap in room on standby. Per pulmonary may consider high flow nasal canula.       Sanam Membreno, RT

## 2024-09-04 NOTE — PROGRESS NOTES
Pulmonary Progress Note  9/4/2024      Admit Date: 8/17/2024  CODE: Full Code    Reason for Consult: acute on chronic respiratory failure with hypoxemia, s/p chest tube, attempted VATS but no decortication done; afib/RVR, volume overload.     Assessment/Plan:   74F w/ active tobacco dependence, emphysema and presumed COPD with no formal PFTs, chronic hypoxemic resp failure on home O2, DM, fibromyalgia, moderate aortic stenosis, HFpEF, presented to [facility] on 8/10 with large right sided effusion; found to have right sided empyema and right sided mucus plugging with Strep pneumo and E coli pneumonia, did not drain well with chest tube and intrapleural lytics, with persistent opacification of the right hemithorax, transferred to [facility] and underwent right VATS with chest tube placement on 8/19. NO decortication was done at that time. Chest tube was removed by surgery on   She has persistent loculated and complex appearing right effusion that has not changed. She also underwent left thoracentesis on 8/31 for 800cc clear yellow fluid; this was transudative based on fluid analysis.  Bedside lung/pleural POCUS today showed above findings in the right pleural space, and a small left pleural effusion that appeared free flowing; risk/benefit does not favor additional left thoracentesis at this time.   Etiology for recurrent decompensations is likely multifactorial but suspect volume overload is a major contributor. Accurate ins/outs not available at this. Weight is down 10lbs since 8/19 but she was at [facility] prior to this so unclear what her initial weight was.     Overall she remains very frail and deconditioned. The right pleural space process has not resolved and another VATS or chest tube is unlikely to be beneficial nor tolerated. Because of this it's unclear how much she can actually recover and this may drive recurrent episodes of decompensation characterized by recurrent hypoxemic respiratory failure,  decompensation, afib, etc. She's also at risk for worsening sepsis from various infectious issues and the complex right pleural space process.  Strongly recommend goals of care discussions at this point as it is unclear whether she will be able to leave the hospital any time soon, and she is approaching 30 days of hospitalization.      Plan:  - titrate FiO2 for goal SpO2 88-92%, avoid hyperoxia, has home oxygen  - may benefit from trial of HFNC and/or CPAP for PEEP support given volume overload issues.  - recommend aggressive diuresis with furosemide infusion, place paiz for accurate ins/outs. Consider addition of metolazone to augment loop diuretic. HMS to discuss this with cardiology.  - HR control per EP, cardiology teams.  - continue levalbuterol + ipratroprium nebs QID; flutter valve.   - stop the volara treatments; not tolerating these  - continue acetylcysteine nebs QIS  - continue 3% sodium chloride nebs QID  - encourage OOB, PT/OT, push IS  - finish oral prednisone course as ordered  - abx per ID, currently on ceftriaxone.  - has follow up in our pulmonary clinic on 9/25 with CT chest prior to visit. May need to push this back depending on her progress, or lack thereof.  - recommend palliative care consultation and goals of care discussion with patient and her family.     We'll continue to follow    Addy Gardiner MD (Avi)  [facility] Pulmonary & Critical Care ([facility])  Send me a secure message using [employer]  Clinic [phone]  Fax [phone]     Subjective/Interim Events:   She was on 5LPm when I first saw her today, resting comfortably. Declined bedside pleural/lung POCUS.  Later on, a rapid response was called due to afib/RVR and worsening hypoxemia; was up to 10Lpm which was weaned down to 6Lpm.  Bedside pleural/lung POCUS was done at that time.  HR 140s.  She denies any pain and said her breathing was OK.       Medications:     Current Facility-Administered Medications   Medication  Dose Route Frequency Provider Last Rate Last Admin    DOBUTamine (DOBUTREX) 500 mg in D5W 250 mL infusion (adult std conc)  2.5 mcg/kg/min Intravenous Continuous Thom Robert MD 5 mL/hr at 09/03/24 1744 2.5 mcg/kg/min at 09/03/24 1744     Current Facility-Administered Medications   Medication Dose Route Frequency Provider Last Rate Last Admin    acetaZOLAMIDE (DIAMOX) injection 500 mg  500 mg Intravenous Q12H Thom Robert MD        acetylcysteine (MUCOMYST) 20 % nebulizer solution 2 mL  2 mL Nebulization 4x Daily Donte Dutta DO   2 mL at 09/04/24 0822    apixaban ANTICOAGULANT (ELIQUIS) tablet 5 mg  5 mg Oral BID Lauren Willson MD   5 mg at 09/04/24 0814    artificial saliva (BIOTENE MT) solution 1 spray  1 spray Mouth/Throat 4x Daily Renée Soriano MD   1 spray at 09/02/24 2057    atorvastatin (LIPITOR) tablet 20 mg  20 mg Oral At Bedtime Renée Soriano MD   20 mg at 09/03/24 2145    cefTRIAXone (ROCEPHIN) 2 g vial to attach to  ml bag for ADULTS or NS 50 ml bag for PEDS  2 g Intravenous Q24H Ravindra Bacon  mL/hr at 08/31/24 1213 2 g at 09/04/24 1405    digoxin (LANOXIN) injection 250 mcg  250 mcg Intravenous Once Yasmine Gifford MD        digoxin (LANOXIN) tablet 125 mcg  125 mcg Oral Daily Renée Soriano MD   125 mcg at 09/04/24 0638    [Held by provider] diltiazem ER COATED BEADS (CARDIZEM CD/CARTIA XT) 24 hr capsule 120 mg  120 mg Oral Daily Renée Soriano MD   120 mg at 09/04/24 0638    fluticasone-vilanterol (BREO ELLIPTA) 200-25 MCG/ACT inhaler 1 puff  1 puff Inhalation Daily Renée Soriano MD   1 puff at 09/04/24 0812    furosemide (LASIX) injection 20 mg  20 mg Intravenous Q12H Thom Robert MD        gabapentin (NEURONTIN) capsule 600 mg  600 mg Oral TID Renée Soriano MD   600 mg at 09/04/24 0813    insulin aspart (NovoLOG) injection (RAPID ACTING)  1-10 Units Subcutaneous TID AC Renée Soriano MD   3 Units at 09/02/24 1711    insulin aspart  "(NovoLOG) injection (RAPID ACTING)  1-7 Units Subcutaneous At Bedtime Renée Soriano MD   3 Units at 09/02/24 2055    levalbuterol (XOPENEX) neb solution 1.25 mg  1.25 mg Nebulization 4x Daily Carlito Rossna G, RT   1.25 mg at 09/04/24 0822    And    ipratropium (ATROVENT) 0.02 % neb solution 0.5 mg  0.5 mg Nebulization 4x daily Stohr, Aamri G, RT   0.5 mg at 09/04/24 0822    Lidocaine (LIDOCARE) 4 % Patch 1 patch  1 patch Transdermal Q24h Antonia Baptiste MD   1 patch at 09/03/24 2143    [Held by provider] metoprolol tartrate (LOPRESSOR) tablet 25 mg  25 mg Oral BID Donte Dutta DO        pantoprazole (PROTONIX) EC tablet 40 mg  40 mg Oral QAM AC Renée Soriano MD   40 mg at 09/04/24 0813    polyethylene glycol (MIRALAX) Packet 17 g  17 g Oral Daily Renée Soriano MD   17 g at 08/28/24 0803    senna-docusate (SENOKOT-S/PERICOLACE) 8.6-50 MG per tablet 1 tablet  1 tablet Oral BID Renée Soriano MD   1 tablet at 09/03/24 2146    sodium chloride (NEBUSAL) 3 % neb solution 3 mL  3 mL Nebulization 4x daily Carmelita Buckley MD   3 mL at 09/04/24 0822    sodium chloride (PF) 0.9% PF flush 10 mL  10 mL Intracatheter Q8H Lauren Willson MD   10 mL at 09/04/24 0920    sodium chloride (PF) 0.9% PF flush 10-40 mL  10-40 mL Intracatheter Q7 Days Renée Soriano MD   30 mL at 08/31/24 1754    sterile talc (STERITALC) powder for sclerosing 4 g  4 g INTRAPLEURAL Once Renée Soriano MD        sucralfate (CARAFATE) tablet 1 g  1 g Oral TID AC Renée Soriano MD   1 g at 09/04/24 1143         Exam/Data:   Vitals  BP (!) 141/79 (BP Location: Left arm)   Pulse (!) 129   Temp 99.8  F (37.7  C) (Oral)   Resp 24   Ht 1.651 m (5' 5\")   Wt 65.7 kg (144 lb 13.5 oz)   LMP  (LMP Unknown)   SpO2 95%   BMI 24.10 kg/m    BP - Mean:  [67-99] 84  I/O last 3 completed shifts:  In: 360 [P.O.:360]  Out: 550 [Urine:550]  Weight change:   [unfilled]  Resp: 24    EXAM:  Physical Exam  Gen: awake, alert, oriented, no distress  HEENT: NT, " no AUGUST  CV: RRR, no m/g/r  Resp: diminished at right and left bases.   Abd: soft, nontender, BS+  Skin: no rashes or lesions  Ext: no edema  Neuro: PERRL, nonfocal exam    ROS:  A 10-system review was obtained and is negative with the exception of the symptoms noted above.    DATA:    PFT DATA none available        Micro  PCT wnl on 8/10  Viral swabs neg     Bronch/BAL 8/11  Culture + for e. Coli, pan-sensitive  1+ yeast, candida albicans  Total nuc cells 22, 65% PMN, 9% lymphs, 20% lining cells  Cytology neg for malignancy     Pleural fluid 8/11  Culture + for strep pneumo, pan-sensitive  Total nuc cells 1451  73% PMN, 22% lymphs, 5% mono/mac  Glucose 159    Protein 3.3  TG 19  Cyto from 8/19 neg for malignancy  PJP pCR neg    Pleural fluid 8/31: left pleural effusion  Total nuc cells 295; 67% mono/mac, 11% lining. 5% PMN. 17% lymphs.  Glucose 135  LDH 87  Protein 1.4  Culture pending, gram stain neg  Cytology neg for malignancy.     Sputum 8/12  2+ strep pneumo  1+ e. Coli, pan-sensitive    IMAGING:   XR Chest Port 1 View    Result Date: 8/18/2024  EXAM: XR CHEST PORT 1 VIEW LOCATION: [facility] DATE: 8/18/2024 INDICATION: Recheck chest tube and pleural effusions COMPARISON: 8/17/2024     IMPRESSION: No change since yesterday. Small caliber chest tube projected at the right lung base unchanged. Complete opacification of the right hemithorax with volume loss unchanged. Right PICC line tip in low SVC. Transvenous pacemaker. Hyperinflation left lung.    POC US Chest B-Scan    Limited Bedside Lung Ultrasound, performed and interpreted by me. Indication: empyema and dyspnea With the patient positioned supine, right posterior and lateral lung fields were examined for evidence of thoracic free fluid, pulmonary consolidation, and pulmonary edema. Findings: moderate right pleural effusion noted, mostly free flowing. Some debris noted (plankton sign) Chest tube visualized in the pleural  effusion. Right lung atelectasis noted. No obvious loculations although scanning was limited due to presence of dressing. IMPRESSION: moderate to large right pleural effusion with chest tube in place. No obvious loculations noted on this limited bedside pleural/lung POCUS.      XR Chest Port 1 View    Result Date: 8/17/2024  EXAM: XR CHEST PORT 1 VIEW LOCATION: [facility] DATE: 8/17/2024 INDICATION: Recheck chest tube and pleural effusions COMPARISON: CT chest without contrast 08/16/2024, chest radiograph 08/15/2024     IMPRESSION: Stable position of the right basilar pigtail chest tube. Stable complete opacification of the right hemithorax with rib crowding compatible with large pleural effusion and associated collapse of the right lung. Right upper extremity PICC line  with tip overlying the mid aspect of the SVC. Cardiomediastinal silhouette is partially obscured by the above process however appears grossly stable. Aortic valve replacement. Stable position of the left chest pacemaker. Trace left pleural effusion. Streaky opacities at the left lung base favoring atelectasis. No pneumothorax. Unchanged osseous structures.

## 2024-09-04 NOTE — SIGNIFICANT EVENT
Significant Event Note    Time of event: 1:52 PM September 4, 2024    Description of event:  Rapid Response    Called for increased work of breathing in the setting of HFpEF/RV dysfunction    Dr. Gifford was at bedside on arrival.      I was standing by to assist as she went over the case with the bedside nurse    The patient was on oxymask and appeared to have mild increased work of breathing and oxygenating well, and was alert/oriented and conversing, asking about what was going on.    Orders were called by the hospitalist, including a chest Xray, and the rapid was de-escalated    Plan:  Per Hospitalist    Discussed with: Dr. Ирина Hernández MD

## 2024-09-04 NOTE — TREATMENT PLAN
Respiratory Treatment Plan     Patient Score: 12  Patient Acuity: 3    Clinical Indication for Therapy: history of bronchospasm, productive cough, atelectasis, and prevent atelectasis  Post-op VATS and decortication on 8/19. Pneumonia    Therapy Ordered: Xopnex. Atrovent, 3% Nebusal, and 2ml 20% Mucomyst QID. Chest physiotherapy       History:   -Smoking History: current prior to admit   -Home Medications: Duoneb and albuterol as needed, Symbicort daily   -Home Oxygen: 3-4L at home baseline        Assessment Summary: History of presumed COPD, active smoker prior to admission, and had home oxygen use of 4L at baseline. Multiple pulmonary interventions completed and the pulmonary team is follow. Will defer treatment plans to follow pulmonary MD.     Currently using Xopnex. Atrovent, 3% Nebusal, and 2ml 20% Mucomyst QID.     Declines the Volara, but does do well with the flutter. MD aware.       Sanam Membreno, RT  9/4/2024

## 2024-09-04 NOTE — PLAN OF CARE
Heart Failure Care Map  GOALS TO BE MET BEFORE DISCHARGE:    1. Decrease congestion and/or edema with diuretic therapy to achieve near optimal volume status.     Dyspnea improved: Yes, satisfactory for discharge.   Edema improved: Yes, satisfactory for discharge.        Last 24 hour I/O:   Intake/Output Summary (Last 24 hours) at 9/3/2024 2036  Last data filed at 9/3/2024 1400  Gross per 24 hour   Intake 360 ml   Output 200 ml   Net 160 ml           Net I/O and Weights since admission:   08/04 2300 - 09/03 2259  In: 22533.01 [P.O.:75663; I.V.:3855.01]  Out: 27234 [Urine:82039]  Net: -8584.99     Vitals:    08/19/24 0456 08/20/24 0705 08/21/24 0304 08/28/24 0733   Weight: 70.3 kg (154 lb 15.7 oz) 70 kg (154 lb 5.2 oz) 69.6 kg (153 lb 7 oz) 70.7 kg (155 lb 13.8 oz)    09/02/24 0552   Weight: 66.1 kg (145 lb 11.6 oz)       2.  O2 sats > 90% on room air, or at prior home O2 therapy level.      Able to wean O2 this shift to keep sats above 90%?: No, further care required to meet this goal. Please explain Pt remains on 5L O2   Does patient use Home O2? Yes-  3L          Current oxygenation status:   SpO2: 96 %     O2 Device: Nasal cannula, Oxygen Delivery: 5 LPM    3.  Tolerates ambulation and mobility near baseline.     Ambulation: No, further care required to meet this goal. Please explain Pt is extremely weak and deconditioned d/t prolonged hospital stay.    Times patient ambulated with staff this shift: 0    Please review the Heart Failure Care Map for additional HF goal outcomes.    Pt transferred from P4 for dobutamine gtt. Dobutamine gtt started and albumin finished. Clarified IV lasix orders and MD wanted the IV lasix given. Pt w/ crackles in bases, productive cough, denies dyspnea. BP improved with dobutamine gtt and albumin. Two RN skin check completed and noted that pt had blanchable redness over bony prominences, every 2 hour repositioning started. Mepilex to sacrum was changed. Pt incontinent of stool and  urine, using purewick for accurate I&O. Family at bedside and updated on POC. Two ports on triple lumen PICC not drawing blood back, TPA ordered by ivan MOLINA. Pt in A-flutter w/ RVR -130's. Cardiology paged and stated to let HR run higher this evening as dobutamine gtt was started and they did not want to lower BP w/ rate control medications.     Nicolasa Dow RN  9/3/2024

## 2024-09-04 NOTE — SIGNIFICANT EVENT
"RN informed that pt's oxygen was turned up to 9L per oxymask from 5L per NC for sats 85%.  RN checked pt and pt appeared cyanotic, dyspneic, -140 and JVD present.  Pt stated she felt \"terrible.\"  RN had RT look and assess pt with her.  RT notified md of change.      RN and RT called rapid response.    1400- RRT arrived. XRAy , EKG, lasix, digoxin given.      1515- Chairez placed.    Pt feeling  better.  Urine output improved.  Pt on 5L oxymask.   "

## 2024-09-04 NOTE — PROGRESS NOTES
[facility]    Medicine Progress Note - Hospitalist Service    Date of Admission:  8/17/2024    Assessment & Plan     Summary:  74-year-old female with history of a flutter, COPD, ongoing tobacco abuse, previous pleural effusions and chronic pain admitted 8/17/24 with empyema and transferred from [facility] to [facility] for surgical management.  Patient initially presented to [facility] on 8/10 with large right-sided effusion and found to have right-sided empyema with mucous plugging with strep pneumonia and E. coli pneumonia.  This does not drain well with chest tube and intrapleural lytics which is why patient was transferred to [facility] and underwent right VATS with chest tube placement on 8/19.  No decortication was done.  Patient was supposed to undergo bronchoscopy on 8/21 but was not done due to high O2 requirement.  Chest tube was removed on 8/25 subsequently.  Patient has had persistent loculated and complex appearing right effusion that has not changed.  Patient also had left-sided pleural effusion and 0.8 L was drained on 8/31. Patient is on antibiotics per ID recommendation.    Her stay here is complicated by development of new right-sided heart failure, persistent atrial flutter/fibrillation and anemia.  It has been challenging to diurese the patient given her ongoing hypotension.  Cardiology and EP are closely following.  Patient has been on intermittent diuresis and currently on Lasix drip as of 9/4.  Patient has had multiple rapid response and is currently full code.  Discussed with patient's son on 9/4 about the gradual decline of patient's clinical condition and palliative consulted to assist with goals of care discussion.     Empyema  Acute hypoxic respiratory failure  H/O recurrent right-sided pleural effusion status post bronchoscopy on 8/11/2024 with cultures that grew E. coli and Candida albicans.  Pleural effusion cultures growing strep pneumo  s/p VATS  8/19/24. No true emphyema or rind found, no ability to expand lung  Repeat CT 08/20 showed right pleural effusion s/p chest tube placement, moderate likely loculated/complicated residual as well as small associated right pneumothorax, reexpansion of right upper lobe with persistent collapse of right middle and lower lobe segments.  Significant retained secretions greater in the right lower lobe and increasing left pleural effusion  Plan was for bronchoscopy 08/21, not done as patient was requiring upto 10L oxygen and had high risk of being intubated  Chest tube removed 08/25   Acute hypoxemic respiratory failure worse 8/29-8/31 and likely related to eventual findings of moderate left pleural effusion and acute right sided systolic CHF   8/29: patient with complaints of increased SOB, productive cough and weakness. CXR 8/29 shows Increased opacification in the left lower lobe of concern for worsening pneumonia. Of note patient had remained afebrile and WBC remains normal on IV ceftriaxone for pneumococcus and E coli . Yeast on BAL thought to be colonization  Discussed with ID 8/29 and they recommend no changes  Overnight 8/29-8/30 patient with worsening hypoxemic respiratory failure.   CXR 8/30 with evidence of worsening pulmonary edema and possibly improved LLL opacity.  BNP elevated but improved from previous  CT chest 8/31 showed moderate left pleural effusion and stable changes on right post surgery  S/p left sided thoracentesis 8/31 with 0.8L of clear fluid removed and consistent with transudate  Consulted cardiology regarding difficult diuresis  TTE performed 8/31 and showed preserved LVEF with new evidence of decreased RV systolic function  -- Cardiology recommends holding diuresis for now given her pre-load dependence with acute right sided heart failure  -- Pulmonary consulted to assist with further cares  -- Continue current nebs and pulmonary hygiene  -- ID following and recommends no changes to current  antibiotic plan since current respiratory failure is volume related  -- Current plan is for Ceftriaxone until 09/08 and then will need CXR and likely po Augmentin until follow up CT Chest with Pulmonary  ---Seen by SLP, recommend regular/thin liquids  ---Follow up in Pulmonary clinic 09/25, plan CT chest to be done prior to visit  -- Follow up with Surgery outpatient  -- Cards re-consulted 9/3 with ongoing issues with her volume status, diuresis as mentioned below  -- Baseline o2 use is 4L  -- Patient has been declining slowly and has had multiple rapid responses.  She remains full code, given the overall gradual decline, will consult palliative to assist with goals of care discussion.      Recurrent right sided pleural effuison: CXR 9/3 shows recurrent right sided pleural effusion that pulmonary thinks is too small to tap. There is evidence of great response to recent left sided thoracentesis  -- RR called 94, repeat chest xray pleural fluid/thickening throughout the periphery of right hemithorax      Paroxysmal A-fib/a flutter; status post PPM  Aortic stenosis status post TAVR  Acute on chronic HFpEF with new right sided acute systolic CHF  TTE 8/31 showed preserved EF with new evidence of decreased RV systolic function  -- Has been on multiple meds like metoprolol, diltiazem and digoxin with holding parameters.   -- On Eliquis.  -- Cardiology signed off x 2, reconsult on 9/3  -- Current regimen includes patient to be on dobutamine drip, status post digoxin to 250 mcg x 1.  -- She is off amiodarone due to QTc prolongation. S/p amiodarone bolus X 1 9/4  -- If continues to have elevated heart rate, can consider amnio drip with strict monitoring of her QT interval.  -- Patient diuresis was challenging given her hypotension, blood pressure currently stable.  -- IV Lasix is being transitioned to Lasix drip today. Metolazone X1 ordered.  -- Chairez placed for accurate I/O monitoring.  -- Cardiology and EP monitoring        Metabolic alkalosis:  Suspect primary metabolic alkalosis due to Lasix and poor oral intake  -- labs stable, trend   -- Acetazolamide for contraction alkalosis    Pancytopenia  Acute on chronic anemia  --Noted Hb 6.8, s/p 1 U PRBC 9/4  --Monitor closely as pt on DOAC.  --No S/S of active bleeding, if hb continues to drop will hold DOAC       Deconditioning   -- PT/OT recommend TCU      Hyperlipidemia: continue statin       DM2:  Not on home hypoglycemic meds  Last A1c 6.2  -- continue sliding scale insulin        Sacral erythema  Berna anal skin breakdown  -- Calmoseptine, WOC consulted       Epigastric pain  Denies nausea, vomting  -- continue PPI, Sucralfate,Tums prn and Maalox prn               Diet: Regular Diet Adult  Snacks/Supplements Adult: Glucerna; With Meals  Fluid restriction 1800 ML FLUID    DVT Prophylaxis: DOAC  Chairez Catheter: Not present  Lines: PRESENT      PICC 08/10/24 Triple Lumen Right Brachial vein medial-Site Assessment: WDL      Cardiac Monitoring: None  Code Status: Full Code      Clinically Significant Risk Factors              # Hypoalbuminemia: Lowest albumin = 2.2 g/dL at 8/18/2024  7:04 AM, will monitor as appropriate   # Thrombocytopenia: Lowest platelets = 105 in last 2 days, will monitor for bleeding   # Hypertension: Noted on problem list                 # Financial/Environmental Concerns: none   # Pacemaker present             Disposition Plan   Medically Ready for Discharge: Anticipated in 2-4 Days      Yasmine Gifford MD  Hospitalist Service  [facility]  Securely message with [employer] (more info)  Text page via [employer] Paging/Directory   ______________________________________________________________________    Interval History   .  Patient is new to me. Chart reviewed and events noted.  Patient seen and examined.   Saw her multiple times today.  During my first visit, patient felt comfortable and was sleeping.  Denied any shortness of  "breath.    Subsequently rapid response was called, upon arrival nurse reported patient having increased work of breathing and her O2 requirement went up from 4 L to 9 L. Denied any pain but did report SOB and states \"I feel like shit\". Coughing+. Conversant    Physical Exam   Vital Signs: Temp: 99.8  F (37.7  C) Temp src: Oral BP: (!) 141/79 Pulse: (!) 129   Resp: 24 SpO2: 95 % O2 Device: Oxymask Oxygen Delivery: 5 LPM  Weight: 144 lbs 13.48 oz    General: Elderly female in no distress  HEENT:EOMI, AT,NC  CVS: Irregularly irregular, tachycardia  RS: Diminished breath sounds at bases  Neurology: Wake, alert, oriented, moving all extremities  Psy: Calm         >65 MINUTES of critical time SPENT BY ME on the date of service doing chart review, history, exam, documentation & further activities per the note.    Plan discussed with patient, son over the phone, cardiology, EP, pulmonology MDs, bedside RN      Data       "

## 2024-09-04 NOTE — CONSULTS
HEART CARE CONSULTATON NOTE        Assessment/Recommendations   Assessment/Plan:    Clinically Significant Risk Factors              # Hypoalbuminemia: Lowest albumin = 2.2 g/dL at 8/18/2024  7:04 AM, will monitor as appropriate       # Thrombocytopenia: Lowest platelets = 105 in last 2 days, will monitor for bleeding       # Hypertension: Noted on problem list                     # Financial/Environmental Concerns: none       # Pacemaker present        Persistent atrial fibrillation and flutter  - Recommend increasing Digoxin to 250 mcg daily  - She is on a dobutamine drip which precludes use of beta blockers and calcium channel blockers  - If rates are uncontrolled despite Digoxin, can consider starting Amiodarone drip(with strict monitoring for QT interval)    2. Anticoagulation  - [id number] score atleast 4  - Continue Eliquis if no contra indications(Hb 6.8 today, defer decision to hold to primary team pending work up for anemia)    3. HFpEF  - Management per HF team    4. Empyema  - management per pulmonology    5. Pneumonia  - treatment per primary team/ID           History of Present Illness/Subjective    HPI: Mary Kay Tejada is a 74 year old female with history of HTN, CAD, HFpEF, afib on DOAC, aortic stenosis s/p TAVR, s/p pacemaker, COPD, tobacco user.     She was hospitalized from 7/12 to 7/17 with acute hypercarbic respiratory failure, pneumonia and COVI19 viral infection. CT scan showed RUL collapse, right effusion. Sputum Cx (+) E coli. Developed pneumothorax post right side thoracentesis. Discharged home on O2 and Abx.    She presented to  ED on 8/10/2024 for evaluation of shortness of breath. CXR showed large right pleural effusion. Chest tube was placed and reposition the following day. Diagnostic bronchoscopy 8/11 showed no endobronchial lesions. Pleural fluid (+) strep pneumonia. Follow up chest CT scan showed loculated effusion, started on intrapleural lytics. Follow up CXR showed large  right effusion. Transferred to [facility] for surgical treatment. S/p VATS and decortication on 8/19. Full expansion of right lung in follow up images. Chest tube was removed on 8/25.     Patient had increased O2 requirements, clinically volume up, X ray showed pulmonary congestion and bilateral pleural effusions. High proBNP. Therapeutic left side thoracentesis, transudate effusion. Diuresis was titrated up, improvement of O2 requirements.     She was found to have a Hb of 6.8 today. A RR was called for increased work of breathing.         Physical Examination  Review of Systems          Lab Results    Chemistry/lipid CBC Cardiac Enzymes/BNP/TSH/INR   Recent Labs   Lab Test 09/20/23  1136   CHOL 167   HDL 75   LDL 75   TRIG 83     Recent Labs   Lab Test 09/20/23  1136 07/25/22  1026 12/24/21  0756   LDL 75 53 77     Recent Labs   Lab Test 09/04/24  1336 09/04/24  0756 09/04/24  0539   NA  --   --  142   POTASSIUM  --   --  4.1   CHLORIDE  --   --  99   CO2  --   --  38*   *   < > 144*   BUN  --   --  10.5   CR  --   --  0.48*   GFRESTIMATED  --   --  >90   JOEY  --   --  8.5*    < > = values in this interval not displayed.     Recent Labs   Lab Test 09/04/24  0539 09/03/24  0547 09/02/24  0600   CR 0.48* 0.49* 0.52     Recent Labs   Lab Test 02/06/24  1953 09/20/23  1136 05/25/23  0507   A1C 6.2* 6.1* 6.1*          Recent Labs   Lab Test 09/04/24  0539   WBC 1.6*   HGB 6.8*   HCT 24.4*   MCV 89   *     Recent Labs   Lab Test 09/04/24  0539 09/03/24  0547 09/02/24  0600   HGB 6.8* 7.4* 7.8*    Recent Labs   Lab Test 05/24/23  1322 05/24/23  1015 02/08/23  0759   TROPONINI 0.07 0.08 0.09     Recent Labs   Lab Test 09/03/24  0547 08/29/24  0700 08/12/24  0502 07/12/24  2030 02/19/24  1340 02/06/24  1953 06/02/23  0057 05/24/23  1015 02/08/23  0759   BNP  --   --   --   --   --   --  136* 176* 867*   NTBNPI 2,953* 2,611* 6,274*   < >  --    < >  --   --   --    NTBNP  --   --   --   --  641  --   --    --   --     < > = values in this interval not displayed.     Recent Labs   Lab Test 08/10/24  0937   TSH 2.80     Recent Labs   Lab Test 08/31/24  1028 07/13/24  0703 07/12/24  2030   INR 1.81* 1.27* 1.45*        Medical History  Surgical History Family History Social History   Past Medical History:   Diagnosis Date    Anemia     Aortic stenosis     Aortic valve disorder     Atrial fibrillation (H)     Atrial flutter (H)     Benign neoplasm of adenomatous polyp     large intestine     Chronic constipation     Chronic heart failure with preserved ejection fraction (H) 02/29/2024    Chronic pain syndrome     Congestive heart failure (H)     COPD (chronic obstructive pulmonary disease) (H)     Oxygen at night     Dependence on supplemental oxygen     Oxygen at noc, during the day as needed    Depression     Diabetes mellitus (H)     Dry eye syndrome     Fibromyalgia     Ganglion     left wrist    GERD (gastroesophageal reflux disease)     Hyperlipidemia     Hypertension     Hypokalemia     Infective otitis externa, unspecified     Created by Conversion     Larynx edema     Lung disease     Malignant neoplasm of vulva (H)     Created by Conversion [facility] Annotation: Apr 17 2007  8:24AM - Cammy Bui:  resection per Dr. Alfonso Mane 9/06;  Replacement Utility updated for latest IMO load    Medial epicondylitis     Onychomycosis     Osteoarthritis     Peptic ulcer     Polyneuropathy     Vulvar malignant neoplasm (H)      Past Surgical History:   Procedure Laterality Date    BIOPSY BREAST Right     BIOPSY BREAST Right 01/28/2015    BIOPSY BREAST Right 01/28/2015    Procedure: RIGHT BREAST BIOPSY AFTER WIRE LOCALIZATION AT 0940;  Surgeon: Renée Soriano MD;  Location: [facility] [place];  Service:     BIOPSY OF BREAST, INCISIONAL      Description: Incisional Breast Biopsy;  Recorded: 11/13/2007;  Comments: benign    COLONOSCOPY N/A 06/14/2019    Procedure: COLONOSCOPY;  Surgeon: Eduardo Mora MD;   Location: [facility] OR;  Service: Gastroenterology    CV CORONARY ANGIOGRAM N/A 02/08/2024    Procedure: CV CORONARY ANGIOGRAM;  Surgeon: Moises Valencia MD;  Location: [facility] LAB CV    CV LEFT HEART CATH N/A 02/08/2024    Procedure: Left Heart Catheterization;  Surgeon: Moises Valencia MD;  Location: [facility] LAB CV    CV RIGHT HEART CATH MEASUREMENTS RECORDED N/A 02/08/2024    Procedure: Right Heart Catheterization;  Surgeon: Moises Valencia MD;  Location: [facility] CV    CV TRANSCATHETER AORTIC VALVE REPLACEMENT-FEMORAL APPROACH N/A 02/20/2024    Procedure: Transcatheter Aortic Valve Replacement, possible cardiopulmonary bypass, possible surgical intervention;  Surgeon: Moises Valencia MD;  Location: [facility] CV    EP PACEMAKER DEVICE & IMPLANT- HIS LEAD DUAL N/A 3/7/2024    Procedure: Pacemaker Device & Lead Implant - HIS Lead Dual;  Surgeon: Donnell Leon MD;  Location: [facility] CV    ESOPHAGOSCOPY, GASTROSCOPY, DUODENOSCOPY (EGD), COMBINED N/A 11/06/2018    Procedure: ESOPHAGOGASTRODUODENOSCOPY;  Surgeon: Lit Fernando MD;  Location: [facility] OR;  Service:     HYSTERECTOMY      JOINT REPLACEMENT Left     TKA    OR TRANSCATHETER AORTIC VALVE REPLACEMENT, FEMORAL PERCUTANEOUS APPROACH (STANDBY) N/A 02/20/2024    Procedure: OR TRANSCATHETER AORTIC VALVE REPLACEMENT, FEMORAL PERCUTANEOUS APPROACH (STANDBY);  Surgeon: Ishmael Farias MD;  Location: [facility] CV    PICC TRIPLE LUMEN PLACEMENT  01/12/2023         PICC TRIPLE LUMEN PLACEMENT  8/10/2024    DC ABLATE HEART DYSRHYTHM FOCUS      Description: Catheter Ablation Atrial Fibrillation;  Recorded: 07/31/2012;  Comments: 7/24/12 PVI with Dr. Bansal to all 5 pulm veins and CTI fl ablation line as well.    TRANSCATHETER AORTIC-VALVE REPLACEMENT      VIDEO-ASSISTED THORACOSCOPIC SURGERY (VATS) Right 8/19/2024    Procedure: VIDEO-ASSISTED RIGHT THORACOSCOPIC SURGERY  WITH RIGHT PLEURAL FLUID CYTOLOGY;  Surgeon: Renée Soriano MD;  Location: [facility] [place] SUPRACERV ABD HYSTERECTOMY      Description: Supracervical Hysterectomy;  Proc Date: 01/01/1985;  Comments: some cervix left!; ovaries intact; done for bleeding     Family History   Problem Relation Age of Onset    Heart Failure Mother     Cancer Other         paternal HX-laryngeal     Alcoholism Sister     No Known Problems Daughter     No Known Problems Maternal Grandmother     No Known Problems Maternal Grandfather     No Known Problems Paternal Grandmother     No Known Problems Paternal Grandfather     No Known Problems Maternal Aunt     No Known Problems Paternal Aunt     Alcoholism Sister     Alcoholism Brother     Alcoholism Father     Cancer Paternal Uncle         Gastric-Alcohol    Cancer Paternal Uncle         gastric-Alcohol    Hereditary Breast and Ovarian Cancer Syndrome No family hx of     Breast Cancer No family hx of     Colon Cancer No family hx of     Endometrial Cancer No family hx of     Ovarian Cancer No family hx of         Social History     Socioeconomic History    Marital status:      Spouse name: Not on file    Number of children: Not on file    Years of education: Not on file    Highest education level: Not on file   Occupational History    Not on file   Tobacco Use    Smoking status: Some Days     Current packs/day: 0.25     Types: Cigarettes     Passive exposure: Never    Smokeless tobacco: Never    Tobacco comments:     seen by TTS inpatient on 3/31/22   Vaping Use    Vaping status: Never Used   Substance and Sexual Activity    Alcohol use: Yes     Comment: Alcoholic Drinks/day: very little    Drug use: No    Sexual activity: Not on file   Other Topics Concern    Not on file   Social History Narrative    Not on file     Social Determinants of Health     Financial Resource Strain: Low Risk  (8/21/2024)    Financial Resource Strain     Within the past 12 months, have you or your  "family members you live with been unable to get utilities (heat, electricity) when it was really needed?: No   Food Insecurity: Low Risk  (8/21/2024)    Food Insecurity     Within the past 12 months, did you worry that your food would run out before you got money to buy more?: No     Within the past 12 months, did the food you bought just not last and you didn t have money to get more?: No   Transportation Needs: Low Risk  (8/21/2024)    Transportation Needs     Within the past 12 months, has lack of transportation kept you from medical appointments, getting your medicines, non-medical meetings or appointments, work, or from getting things that you need?: No   Physical Activity: Not on file   Stress: Not on file   Social Connections: Not on file   Interpersonal Safety: Low Risk  (8/21/2024)    Interpersonal Safety     Do you feel physically and emotionally safe where you currently live?: Yes     Within the past 12 months, have you been hit, slapped, kicked or otherwise physically hurt by someone?: No     Within the past 12 months, have you been humiliated or emotionally abused in other ways by your partner or ex-partner?: No   Housing Stability: Low Risk  (8/21/2024)    Housing Stability     Do you have housing? : Yes     Are you worried about losing your housing?: No         Medications  Allergies   Current Outpatient Medications   Medication Sig Dispense Refill    cefTRIAXone (ROCEPHIN) 2 GM vial Inject 2 g over 30 minutes into the vein every 24 hours for 13 days. Last day 9/10/24. Weekly labs while on IV antibiotics: CBC with differential, CMP,, C-reactive protein. Fax lab results to Dr. Bacon at [phone]          Allergies   Allergen Reactions    Celebrex [Celecoxib] Rash     patient had butterfly rash - \"lupus-like\"      Latex Rash         Donnell Leon MD    "

## 2024-09-04 NOTE — PROGRESS NOTES
HEART CARE NOTE          Assessment/Recommendations     1. HFpEF/RV dysfunction c/b cardiogenic shock  Assessment / Plan  Hypervolemic on physical exam -  will give IV albumin bolus given borderline BP; continue IV diuresis; no changes at this time; continue to monitor UOP and renal function closely   Hemodynamics less the adequate - add dobutamine gtt and continue to monitor  Echo significant for RV dysfunction. However, RVSP/PASP as well as LV function noted to be wnl - repeat echo once near euvolemia; may benefit from CT PE protocol, +/- VQ scan and possible RHC + shunt run +/- vasodilator challenge to further evaluate isolated RV dysfunction  GDMT as detailed below; mainstay of treatment for HFpEF includes diuretics and adequate BP control (class I) and SGLT2-I (class 2a); additional medical therapy (ARNI, MRA, ARB) demonstrated less robust evidence for indication but may be considered per guideline recommendations (2b); no indication for Bblockers  GDMT medical therapy on hold given the above     2.  Valvular heart disease  Assessment / Plan  S/p TAVR - adequate function on recent imaging      3. Afib/flutter  Assessment / Plan  Continue digoxin, apixaban - metoprolol held while on inotrope; EP consulted for RVR - please see detailed notes and plan of care on chart     4. CAD  Assessment / Plan  Denies chest pain or anginal equivalents  S/p coronary angiogram significant for mild non-obstructive disease  Continue atorvastatin     5. HLP  Assessment / Plan  Currently on atorvastatin     6. Acute respiratory failure  Assessment / Plan  S/p thoracentesis (transudative) c/b lung empyema - management and supportive care per primary and pulmonary teams     7. DM2  Assessment / Plan  Management per primary team  Currently on ISS    Plan of care discussed on September 4, 2024 with patient at bedside, and primary team overseeing patient's care      History of Present Illness/Subjective    Ms. Mary Kay Tejada is a 74  "year old female with a PMHx significant for (per Epic notation) paroxysmal atrial flutter on apixaban, COPD, hyperlipidemia, diabetes mellitus type 2 with neuropathy, reflux, dizziness, tobacco use, fibromyalgia and chronic pain syndrome, prior pleural effusions and most recent admission in July found with E. coli pneumonia and a right sided pleural effusion which was consistent with a transudate and negative cultures who presents with dyspnea     Today, Mrs. Tejada denies acute cardiac events or complaints; Management plan as detailed above     ECG: Personally reviewed. nonspecific ST and T waves changes, atrial flutter, rate controlled.     ECHO (personnaly Reviewed on 9/3/24):   Left ventricular function is normal.The ejection fraction is 55-60%.  The right ventricle is mild to moderately dilated.  Moderately decreased right ventricular systolic function  The right atrium is moderately dilated.  There is a bioprosthetic aortic valve.  There is no paravalvular regurgitation present.  The study was technically difficult.    Telemetry: personally reviewed September 4, 2024; notable for atrial flutter     Lab results: personally reviewed September 4, 2024; notable for resolving contraction alkalosis     Medical history and pertinent documents reviewed in Care Everywhere please where applicable see details above        Physical Examination Review of Systems   /67   Pulse (!) 142   Temp 98.9  F (37.2  C) (Oral)   Resp 20   Ht 1.651 m (5' 5\")   Wt 65.7 kg (144 lb 13.5 oz)   LMP  (LMP Unknown)   SpO2 93%   BMI 24.10 kg/m    Body mass index is 24.1 kg/m .  Wt Readings from Last 3 Encounters:   09/04/24 65.7 kg (144 lb 13.5 oz)   08/16/24 72.2 kg (159 lb 1.6 oz)   07/15/24 65.3 kg (143 lb 14.4 oz)     General Appearance:   no distress, somnolent   ENT/Mouth: membranes moist, no oral lesions or bleeding gums.      EYES:  no scleral icterus, normal conjunctivae   Neck: no carotid bruits or thyromegaly "   Chest/Lungs:   lungs are clear to auscultation, no rales or wheezing, equal chest wall expansion    Cardiovascular:   Irregular. Normal first and second heart sounds with no murmurs, rubs, or gallops; the carotid, radial and posterior tibial pulses are intact, + JVD and LE edema bilaterally    Abdomen:  no organomegaly, masses, bruits, or tenderness; bowel sounds are present   Extremities: no cyanosis or clubbing   Skin: no xanthelasma, warm.    Neurologic: NAD     Psychiatric: somnolent     A complete 10 systems ROS was reviewed  And is negative except what is listed in the HPI.          Medical History  Surgical History Family History Social History   Past Medical History:   Diagnosis Date    Anemia     Aortic stenosis     Aortic valve disorder     Atrial fibrillation (H)     Atrial flutter (H)     Benign neoplasm of adenomatous polyp     large intestine     Chronic constipation     Chronic heart failure with preserved ejection fraction (H) 02/29/2024    Chronic pain syndrome     Congestive heart failure (H)     COPD (chronic obstructive pulmonary disease) (H)     Oxygen at night     Dependence on supplemental oxygen     Oxygen at noc, during the day as needed    Depression     Diabetes mellitus (H)     Dry eye syndrome     Fibromyalgia     Ganglion     left wrist    GERD (gastroesophageal reflux disease)     Hyperlipidemia     Hypertension     Hypokalemia     Infective otitis externa, unspecified     Created by Conversion     Larynx edema     Lung disease     Malignant neoplasm of vulva (H)     Created by Conversion [facility] Annotation: Apr 17 2007  8:24AM - Cammy Bui:  resection per Dr. Alfonso Mane 9/06;  Replacement Utility updated for latest IMO load    Medial epicondylitis     Onychomycosis     Osteoarthritis     Peptic ulcer     Polyneuropathy     Vulvar malignant neoplasm (H)     Past Surgical History:   Procedure Laterality Date    BIOPSY BREAST Right     BIOPSY BREAST Right  01/28/2015    BIOPSY BREAST Right 01/28/2015    Procedure: RIGHT BREAST BIOPSY AFTER WIRE LOCALIZATION AT 0940;  Surgeon: Renée Soriano MD;  Location: [facility] OR;  Service:     BIOPSY OF BREAST, INCISIONAL      Description: Incisional Breast Biopsy;  Recorded: 11/13/2007;  Comments: benign    COLONOSCOPY N/A 06/14/2019    Procedure: COLONOSCOPY;  Surgeon: Eduardo Mora MD;  Location: [facility] OR;  Service: Gastroenterology    CV CORONARY ANGIOGRAM N/A 02/08/2024    Procedure: CV CORONARY ANGIOGRAM;  Surgeon: Moises Valencia MD;  Location: [facility]    CV LEFT HEART CATH N/A 02/08/2024    Procedure: Left Heart Catheterization;  Surgeon: Moises Valencia MD;  Location: [facility]    CV RIGHT HEART CATH MEASUREMENTS RECORDED N/A 02/08/2024    Procedure: Right Heart Catheterization;  Surgeon: Moises Valencia MD;  Location: [facility]    CV TRANSCATHETER AORTIC VALVE REPLACEMENT-FEMORAL APPROACH N/A 02/20/2024    Procedure: Transcatheter Aortic Valve Replacement, possible cardiopulmonary bypass, possible surgical intervention;  Surgeon: Moises Valencia MD;  Location: [facility] CV    EP PACEMAKER DEVICE & IMPLANT- HIS LEAD DUAL N/A 3/7/2024    Procedure: Pacemaker Device & Lead Implant - HIS Lead Dual;  Surgeon: Donnell Leon MD;  Location: [facility] CV    ESOPHAGOSCOPY, GASTROSCOPY, DUODENOSCOPY (EGD), COMBINED N/A 11/06/2018    Procedure: ESOPHAGOGASTRODUODENOSCOPY;  Surgeon: Lit Fernando MD;  Location: [facility] OR;  Service:     HYSTERECTOMY      JOINT REPLACEMENT Left     TKA    OR TRANSCATHETER AORTIC VALVE REPLACEMENT, FEMORAL PERCUTANEOUS APPROACH (STANDBY) N/A 02/20/2024    Procedure: OR TRANSCATHETER AORTIC VALVE REPLACEMENT, FEMORAL PERCUTANEOUS APPROACH (STANDBY);  Surgeon: Ishmael Farias MD;  Location: [facility] CV    PICC TRIPLE LUMEN PLACEMENT  01/12/2023         PICC TRIPLE LUMEN PLACEMENT   8/10/2024    CT ABLATE HEART DYSRHYTHM FOCUS      Description: Catheter Ablation Atrial Fibrillation;  Recorded: 07/31/2012;  Comments: 7/24/12 PVI with Dr. Bansal to all 5 pulm veins and CTI fl ablation line as well.    TRANSCATHETER AORTIC-VALVE REPLACEMENT      VIDEO-ASSISTED THORACOSCOPIC SURGERY (VATS) Right 8/19/2024    Procedure: VIDEO-ASSISTED RIGHT THORACOSCOPIC SURGERY WITH RIGHT PLEURAL FLUID CYTOLOGY;  Surgeon: Renée Soriano MD;  Location: [facility] OR    [facility] SUPRACERV ABD HYSTERECTOMY      Description: Supracervical Hysterectomy;  Proc Date: 01/01/1985;  Comments: some cervix left!; ovaries intact; done for bleeding    no family history of premature coronary artery disease Social History     Socioeconomic History    Marital status:      Spouse name: Not on file    Number of children: Not on file    Years of education: Not on file    Highest education level: Not on file   Occupational History    Not on file   Tobacco Use    Smoking status: Some Days     Current packs/day: 0.25     Types: Cigarettes     Passive exposure: Never    Smokeless tobacco: Never    Tobacco comments:     seen by TTS inpatient on 3/31/22   Vaping Use    Vaping status: Never Used   Substance and Sexual Activity    Alcohol use: Yes     Comment: Alcoholic Drinks/day: very little    Drug use: No    Sexual activity: Not on file   Other Topics Concern    Not on file   Social History Narrative    Not on file     Social Determinants of Health     Financial Resource Strain: Low Risk  (8/21/2024)    Financial Resource Strain     Within the past 12 months, have you or your family members you live with been unable to get utilities (heat, electricity) when it was really needed?: No   Food Insecurity: Low Risk  (8/21/2024)    Food Insecurity     Within the past 12 months, did you worry that your food would run out before you got money to buy more?: No     Within the past 12 months, did the food you bought just not last and  "you didn t have money to get more?: No   Transportation Needs: Low Risk  (8/21/2024)    Transportation Needs     Within the past 12 months, has lack of transportation kept you from medical appointments, getting your medicines, non-medical meetings or appointments, work, or from getting things that you need?: No   Physical Activity: Not on file   Stress: Not on file   Social Connections: Not on file   Interpersonal Safety: Low Risk  (8/21/2024)    Interpersonal Safety     Do you feel physically and emotionally safe where you currently live?: Yes     Within the past 12 months, have you been hit, slapped, kicked or otherwise physically hurt by someone?: No     Within the past 12 months, have you been humiliated or emotionally abused in other ways by your partner or ex-partner?: No   Housing Stability: Low Risk  (8/21/2024)    Housing Stability     Do you have housing? : Yes     Are you worried about losing your housing?: No           Lab Results    Chemistry/lipid CBC Cardiac Enzymes/BNP/TSH/INR   Lab Results   Component Value Date    CHOL 167 09/20/2023    HDL 75 09/20/2023    TRIG 83 09/20/2023    BUN 11.2 09/03/2024     09/03/2024    CO2 39 (H) 09/03/2024    Lab Results   Component Value Date    WBC 1.6 (L) 09/04/2024    HGB 6.8 (LL) 09/04/2024    HCT 24.4 (L) 09/04/2024    MCV 89 09/04/2024     (L) 09/04/2024    Lab Results   Component Value Date    TROPONINI 0.07 05/24/2023     (H) 06/02/2023    TSH 2.80 08/10/2024    INR 1.81 (H) 08/31/2024     Lab Results   Component Value Date    TROPONINI 0.07 05/24/2023          Weight:    Wt Readings from Last 3 Encounters:   09/04/24 65.7 kg (144 lb 13.5 oz)   08/16/24 72.2 kg (159 lb 1.6 oz)   07/15/24 65.3 kg (143 lb 14.4 oz)       Allergies  Allergies   Allergen Reactions    Celebrex [Celecoxib] Rash     patient had butterfly rash - \"lupus-like\"      Latex Rash         Surgical History  Past Surgical History:   Procedure Laterality Date    BIOPSY " BREAST Right     BIOPSY BREAST Right 01/28/2015    BIOPSY BREAST Right 01/28/2015    Procedure: RIGHT BREAST BIOPSY AFTER WIRE LOCALIZATION AT 0940;  Surgeon: Renée Soriano MD;  Location: [facility];  Service:     BIOPSY OF BREAST, INCISIONAL      Description: Incisional Breast Biopsy;  Recorded: 11/13/2007;  Comments: benign    COLONOSCOPY N/A 06/14/2019    Procedure: COLONOSCOPY;  Surgeon: Eduardo Mora MD;  Location: [facility] OR;  Service: Gastroenterology    CV CORONARY ANGIOGRAM N/A 02/08/2024    Procedure: CV CORONARY ANGIOGRAM;  Surgeon: Moises Valencia MD;  Location: [facility]    CV LEFT HEART CATH N/A 02/08/2024    Procedure: Left Heart Catheterization;  Surgeon: Moises Valencia MD;  Location: [facility]    CV RIGHT HEART CATH MEASUREMENTS RECORDED N/A 02/08/2024    Procedure: Right Heart Catheterization;  Surgeon: Moises Valencia MD;  Location: [facility]    CV TRANSCATHETER AORTIC VALVE REPLACEMENT-FEMORAL APPROACH N/A 02/20/2024    Procedure: Transcatheter Aortic Valve Replacement, possible cardiopulmonary bypass, possible surgical intervention;  Surgeon: Moises Valencia MD;  Location: [facility] CV    EP PACEMAKER DEVICE & IMPLANT- HIS LEAD DUAL N/A 3/7/2024    Procedure: Pacemaker Device & Lead Implant - HIS Lead Dual;  Surgeon: Donnell Leon MD;  Location: [facility] CV    ESOPHAGOSCOPY, GASTROSCOPY, DUODENOSCOPY (EGD), COMBINED N/A 11/06/2018    Procedure: ESOPHAGOGASTRODUODENOSCOPY;  Surgeon: Lit Fernando MD;  Location: [facility];  Service:     HYSTERECTOMY      JOINT REPLACEMENT Left     TKA    OR TRANSCATHETER AORTIC VALVE REPLACEMENT, FEMORAL PERCUTANEOUS APPROACH (STANDBY) N/A 02/20/2024    Procedure: OR TRANSCATHETER AORTIC VALVE REPLACEMENT, FEMORAL PERCUTANEOUS APPROACH (STANDBY);  Surgeon: Ishmael Farias MD;  Location: [facility] CV    PICC TRIPLE LUMEN PLACEMENT  01/12/2023          PICC TRIPLE LUMEN PLACEMENT  8/10/2024    AR ABLATE HEART DYSRHYTHM FOCUS      Description: Catheter Ablation Atrial Fibrillation;  Recorded: 07/31/2012;  Comments: 7/24/12 PVI with Dr. Gardiner and nilay to all 5 pulm veins and CTI fl ablation line as well.    TRANSCATHETER AORTIC-VALVE REPLACEMENT      VIDEO-ASSISTED THORACOSCOPIC SURGERY (VATS) Right 8/19/2024    Procedure: VIDEO-ASSISTED RIGHT THORACOSCOPIC SURGERY WITH RIGHT PLEURAL FLUID CYTOLOGY;  Surgeon: Renée Soriano MD;  Location: [facility] OR    [facility] SUPRACERV ABD HYSTERECTOMY      Description: Supracervical Hysterectomy;  Proc Date: 01/01/1985;  Comments: some cervix left!; ovaries intact; done for bleeding       Social History  Tobacco:   History   Smoking Status    Some Days    Types: Cigarettes   Smokeless Tobacco    Never    Alcohol:   Social History    Substance and Sexual Activity      Alcohol use: Yes        Comment: Alcoholic Drinks/day: very little   Illicit Drugs:   History   Drug Use No       Family History  Family History   Problem Relation Age of Onset    Heart Failure Mother     Cancer Other         paternal HX-laryngeal     Alcoholism Sister     No Known Problems Daughter     No Known Problems Maternal Grandmother     No Known Problems Maternal Grandfather     No Known Problems Paternal Grandmother     No Known Problems Paternal Grandfather     No Known Problems Maternal Aunt     No Known Problems Paternal Aunt     Alcoholism Sister     Alcoholism Brother     Alcoholism Father     Cancer Paternal Uncle         Gastric-Alcohol    Cancer Paternal Uncle         gastric-Alcohol    Hereditary Breast and Ovarian Cancer Syndrome No family hx of     Breast Cancer No family hx of     Colon Cancer No family hx of     Endometrial Cancer No family hx of     Ovarian Cancer No family hx of           Thom Robert MD on 9/4/2024      cc: Cammy Bui

## 2024-09-04 NOTE — PROGRESS NOTES
GENERAL SURGERY BRIEF PROGRESS NOTE:    Mary Kay Tejada is a 74 year old female with complicated PMH including HFpEF, COPD on 3L at home, A-fib on Eliquis who presents with recurrent right sided pleural effusion s/p VATS and 32 Fr chest tube 8/19 without full re-expansion of the right lung in the OR. Pulmonology planned repeat bronchoscopy on 8/21 to but patient deferred procedure. Chest tube was removed on 8/25 and a stitch was left in place. General surgery signed off and patient was scheduled to be seen in clinic for stitch removal today. Patient still admitted and stitch removed at bedside with steri strips placed. Patient continues to have bilateral pleural effusions managed by pulmonology.     Plan:  - Stitch at previous chest tube site removed at bedside  - Continue aggressive pulmonary toileting per pulm   - Abx per ID  - Medical management per primary team  - General surgery signed off    Cheri Raphael PA-C  [facility] General Surgery

## 2024-09-04 NOTE — PROGRESS NOTES
Care Management Follow Up    Length of Stay (days): 17    Expected Discharge Date: 09/06/2024    Anticipated Discharge Plan:  Transitional Care, Skilled Nursing Facility    Transportation: TBD    PT Recommendations: Transitional Care Facility, Per plan established by the PT  OT Recommendations:  Transitional Care Facility     Barriers to Discharge: medical stability, placement, palliative consult    Prior Living Situation: house with child(david), adult    Discussed  Partnership in Safe Discharge Planning  document with patient/family: No     Handoff Completed: No, handoff not indicated or clinically appropriate    Patient/Spokesperson Updated: No    Additional Information:  Called ynes Choe with [facility] in admissions.  She request to send referral again when closer to discharge.     Next Steps: follow for goals of care    JOSEPH Barrett

## 2024-09-04 NOTE — PLAN OF CARE
Problem: Dysrhythmia  Goal: Normalized Cardiac Rhythm  Outcome: Not Progressing     Problem: Skin Injury Risk Increased  Goal: Skin Health and Integrity  Outcome: Not Progressing  Intervention: Plan: Nurse Driven Intervention: Moisture Management  Recent Flowsheet Documentation  Taken 9/4/2024 0000 by Kiran Alexandra RN  Moisture Interventions: Incontinence pad  Bathing/Skin Care:   incontinence care   linen changed  Plan: Moisture Management: urinary collection device  Taken 9/3/2024 2000 by Kiran Alexandra RN  Moisture Interventions: Incontinence pad  Bathing/Skin Care:   incontinence care   linen changed  Plan: Moisture Management: urinary collection device  Intervention: Plan: Nurse Driven Intervention: Friction and Shear  Recent Flowsheet Documentation  Taken 9/4/2024 0000 by Kiran Alexandra RN  Friction/Shear Interventions: Silicone foam sacral dressing  Taken 9/3/2024 2000 by Kiran Alexandra RN  Friction/Shear Interventions: Silicone foam sacral dressing  Intervention: Optimize Skin Protection  Recent Flowsheet Documentation  Taken 9/4/2024 0415 by Kiran Alexandra RN  Head of Bed (HOB) Positioning: HOB at 20-30 degrees  Taken 9/4/2024 0200 by Kiran Alexandra RN  Head of Bed (HOB) Positioning: HOB at 20-30 degrees  Taken 9/4/2024 0000 by Kiran Alexandra RN  Activity Management: activity adjusted per tolerance  Head of Bed (HOB) Positioning: HOB at 20-30 degrees  Taken 9/3/2024 2000 by Kiran Alexandra RN  Activity Management: activity adjusted per tolerance  Head of Bed (HOB) Positioning: HOB at 30-45 degrees     Problem: Gas Exchange Impaired  Goal: Optimal Gas Exchange  Outcome: Progressing  Intervention: Optimize Oxygenation and Ventilation  Recent Flowsheet Documentation  Taken 9/4/2024 0415 by Kiran Alexandra RN  Head of Bed (HOB) Positioning: HOB at 20-30 degrees  Taken 9/4/2024 0200 by Kiran Alexandra RN  Head of Bed (HOB) Positioning: HOB at 20-30 degrees  Taken 9/4/2024 0000 by Kiran Alexandra  RN  Head of Bed (HOB) Positioning: HOB at 20-30 degrees  Taken 9/3/2024 2000 by Kiran Alexandra RN  Head of Bed (HOB) Positioning: HOB at 30-45 degrees     Problem: Comorbidity Management  Goal: Blood Pressure in Desired Range  Outcome: Progressing  Intervention: Maintain Blood Pressure Management  Recent Flowsheet Documentation  Taken 9/4/2024 0000 by Kiran Alexandra RN  Medication Review/Management: medications reviewed  Taken 9/3/2024 2000 by Kiran Alexandra RN  Medication Review/Management: medications reviewed   Goal Outcome Evaluation:    Pt is A&Ox4. BP is still soft. HR increase from to 130's to the 179s. Notified Cards. Ordered to give digoxin and Diltiazem PO. Hgb is this AM is 6.8. Notified CC. Type and screen order.   O2 is titrating from 5L to 4L overight, pt tolerated it well. Reported pain on right shoulder, prn tylenol given with relief. Reposition every 2 hour. Makes needs known.     Dobutamine drip infusing.

## 2024-09-05 ENCOUNTER — APPOINTMENT (OUTPATIENT)
Dept: PHYSICAL THERAPY | Facility: HOSPITAL | Age: 74
DRG: 166 | End: 2024-09-05
Attending: HOSPITALIST
Payer: COMMERCIAL

## 2024-09-05 LAB
ANION GAP SERPL CALCULATED.3IONS-SCNC: 8 MMOL/L (ref 7–15)
ATRIAL RATE - MUSE: 102 BPM
ATRIAL RATE - MUSE: 107 BPM
BACTERIA PLR CULT: NO GROWTH
BUN SERPL-MCNC: 12.3 MG/DL (ref 8–23)
CALCIUM SERPL-MCNC: 9.5 MG/DL (ref 8.8–10.4)
CHLORIDE SERPL-SCNC: 86 MMOL/L (ref 98–107)
CREAT SERPL-MCNC: 0.66 MG/DL (ref 0.51–0.95)
DIASTOLIC BLOOD PRESSURE - MUSE: NORMAL MMHG
DIASTOLIC BLOOD PRESSURE - MUSE: NORMAL MMHG
EGFRCR SERPLBLD CKD-EPI 2021: >90 ML/MIN/1.73M2
ERYTHROCYTE [DISTWIDTH] IN BLOOD BY AUTOMATED COUNT: 19.1 % (ref 10–15)
GLUCOSE BLDC GLUCOMTR-MCNC: 110 MG/DL (ref 70–99)
GLUCOSE BLDC GLUCOMTR-MCNC: 125 MG/DL (ref 70–99)
GLUCOSE BLDC GLUCOMTR-MCNC: 96 MG/DL (ref 70–99)
GLUCOSE BLDC GLUCOMTR-MCNC: 98 MG/DL (ref 70–99)
GLUCOSE SERPL-MCNC: 89 MG/DL (ref 70–99)
GRAM STAIN RESULT: NORMAL
GRAM STAIN RESULT: NORMAL
HCO3 SERPL-SCNC: 41 MMOL/L (ref 22–29)
HCT VFR BLD AUTO: 28.7 % (ref 35–47)
HGB BLD-MCNC: 8.4 G/DL (ref 11.7–15.7)
INTERPRETATION ECG - MUSE: NORMAL
INTERPRETATION ECG - MUSE: NORMAL
MAGNESIUM SERPL-MCNC: 1.9 MG/DL (ref 1.7–2.3)
MCH RBC QN AUTO: 25.2 PG (ref 26.5–33)
MCHC RBC AUTO-ENTMCNC: 29.3 G/DL (ref 31.5–36.5)
MCV RBC AUTO: 86 FL (ref 78–100)
P AXIS - MUSE: NORMAL DEGREES
P AXIS - MUSE: NORMAL DEGREES
PLATELET # BLD AUTO: 115 10E3/UL (ref 150–450)
POTASSIUM SERPL-SCNC: 3.4 MMOL/L (ref 3.4–5.3)
POTASSIUM SERPL-SCNC: 3.6 MMOL/L (ref 3.4–5.3)
PR INTERVAL - MUSE: 136 MS
PR INTERVAL - MUSE: 160 MS
QRS DURATION - MUSE: 96 MS
QRS DURATION - MUSE: 96 MS
QT - MUSE: 344 MS
QT - MUSE: 354 MS
QTC - MUSE: 459 MS
QTC - MUSE: 461 MS
R AXIS - MUSE: -66 DEGREES
R AXIS - MUSE: -66 DEGREES
RBC # BLD AUTO: 3.33 10E6/UL (ref 3.8–5.2)
SODIUM SERPL-SCNC: 135 MMOL/L (ref 135–145)
SYSTOLIC BLOOD PRESSURE - MUSE: NORMAL MMHG
SYSTOLIC BLOOD PRESSURE - MUSE: NORMAL MMHG
T AXIS - MUSE: 104 DEGREES
T AXIS - MUSE: 109 DEGREES
VENTRICULAR RATE- MUSE: 102 BPM
VENTRICULAR RATE- MUSE: 107 BPM
WBC # BLD AUTO: 1.8 10E3/UL (ref 4–11)

## 2024-09-05 PROCEDURE — 250N000013 HC RX MED GY IP 250 OP 250 PS 637: Performed by: HOSPITALIST

## 2024-09-05 PROCEDURE — 250N000013 HC RX MED GY IP 250 OP 250 PS 637: Performed by: STUDENT IN AN ORGANIZED HEALTH CARE EDUCATION/TRAINING PROGRAM

## 2024-09-05 PROCEDURE — 250N000009 HC RX 250: Performed by: INTERNAL MEDICINE

## 2024-09-05 PROCEDURE — 99232 SBSQ HOSP IP/OBS MODERATE 35: CPT | Performed by: INTERNAL MEDICINE

## 2024-09-05 PROCEDURE — 93005 ELECTROCARDIOGRAM TRACING: CPT

## 2024-09-05 PROCEDURE — 250N000011 HC RX IP 250 OP 636: Performed by: INTERNAL MEDICINE

## 2024-09-05 PROCEDURE — 94640 AIRWAY INHALATION TREATMENT: CPT

## 2024-09-05 PROCEDURE — 250N000013 HC RX MED GY IP 250 OP 250 PS 637: Performed by: SPECIALIST

## 2024-09-05 PROCEDURE — 84132 ASSAY OF SERUM POTASSIUM: CPT | Performed by: HOSPITALIST

## 2024-09-05 PROCEDURE — 99291 CRITICAL CARE FIRST HOUR: CPT | Performed by: INTERNAL MEDICINE

## 2024-09-05 PROCEDURE — 99223 1ST HOSP IP/OBS HIGH 75: CPT | Performed by: FAMILY MEDICINE

## 2024-09-05 PROCEDURE — 210N000001 HC R&B IMCU HEART CARE

## 2024-09-05 PROCEDURE — 999N000157 HC STATISTIC RCP TIME EA 10 MIN

## 2024-09-05 PROCEDURE — 97530 THERAPEUTIC ACTIVITIES: CPT | Mod: GP

## 2024-09-05 PROCEDURE — 99418 PROLNG IP/OBS E/M EA 15 MIN: CPT | Performed by: FAMILY MEDICINE

## 2024-09-05 PROCEDURE — 94640 AIRWAY INHALATION TREATMENT: CPT | Mod: 76

## 2024-09-05 PROCEDURE — 80048 BASIC METABOLIC PNL TOTAL CA: CPT | Performed by: SPECIALIST

## 2024-09-05 PROCEDURE — 258N000003 HC RX IP 258 OP 636: Performed by: INTERNAL MEDICINE

## 2024-09-05 PROCEDURE — 93010 ELECTROCARDIOGRAM REPORT: CPT | Performed by: STUDENT IN AN ORGANIZED HEALTH CARE EDUCATION/TRAINING PROGRAM

## 2024-09-05 PROCEDURE — 85027 COMPLETE CBC AUTOMATED: CPT | Performed by: HOSPITALIST

## 2024-09-05 PROCEDURE — 250N000013 HC RX MED GY IP 250 OP 250 PS 637: Performed by: FAMILY MEDICINE

## 2024-09-05 PROCEDURE — 94799 UNLISTED PULMONARY SVC/PX: CPT

## 2024-09-05 PROCEDURE — 83735 ASSAY OF MAGNESIUM: CPT | Performed by: HOSPITALIST

## 2024-09-05 PROCEDURE — 99233 SBSQ HOSP IP/OBS HIGH 50: CPT | Performed by: HOSPITALIST

## 2024-09-05 PROCEDURE — 250N000009 HC RX 250

## 2024-09-05 PROCEDURE — 93005 ELECTROCARDIOGRAM TRACING: CPT | Performed by: INTERNAL MEDICINE

## 2024-09-05 RX ORDER — GABAPENTIN 300 MG/1
300 CAPSULE ORAL 3 TIMES DAILY
Status: DISCONTINUED | OUTPATIENT
Start: 2024-09-05 | End: 2024-09-09 | Stop reason: HOSPADM

## 2024-09-05 RX ORDER — POTASSIUM CHLORIDE 1500 MG/1
40 TABLET, EXTENDED RELEASE ORAL ONCE
Status: COMPLETED | OUTPATIENT
Start: 2024-09-05 | End: 2024-09-05

## 2024-09-05 RX ADMIN — SODIUM CHLORIDE SOLN NEBU 3% 3 ML: 3 NEBU SOLN at 12:15

## 2024-09-05 RX ADMIN — SODIUM CHLORIDE SOLN NEBU 3% 3 ML: 3 NEBU SOLN at 19:54

## 2024-09-05 RX ADMIN — Medication 1 SPRAY: at 19:00

## 2024-09-05 RX ADMIN — Medication 1 SPRAY: at 08:28

## 2024-09-05 RX ADMIN — SODIUM CHLORIDE SOLN NEBU 3% 3 ML: 3 NEBU SOLN at 07:18

## 2024-09-05 RX ADMIN — PANTOPRAZOLE SODIUM 40 MG: 40 TABLET, DELAYED RELEASE ORAL at 06:43

## 2024-09-05 RX ADMIN — ACETYLCYSTEINE 2 ML: 200 SOLUTION ORAL; RESPIRATORY (INHALATION) at 07:17

## 2024-09-05 RX ADMIN — GABAPENTIN 600 MG: 300 CAPSULE ORAL at 13:05

## 2024-09-05 RX ADMIN — GABAPENTIN 600 MG: 300 CAPSULE ORAL at 08:27

## 2024-09-05 RX ADMIN — LEVALBUTEROL HYDROCHLORIDE 1.25 MG: 1.25 SOLUTION RESPIRATORY (INHALATION) at 19:53

## 2024-09-05 RX ADMIN — IPRATROPIUM BROMIDE 0.5 MG: 0.5 SOLUTION RESPIRATORY (INHALATION) at 12:14

## 2024-09-05 RX ADMIN — POLYETHYLENE GLYCOL 3350 17 G: 17 POWDER, FOR SOLUTION ORAL at 08:28

## 2024-09-05 RX ADMIN — FUROSEMIDE 15 MG/HR: 10 INJECTION, SOLUTION INTRAVENOUS at 12:50

## 2024-09-05 RX ADMIN — ACETAZOLAMIDE 500 MG: 500 INJECTION, POWDER, LYOPHILIZED, FOR SOLUTION INTRAVENOUS at 03:35

## 2024-09-05 RX ADMIN — SUCRALFATE 1 G: 1 TABLET ORAL at 06:43

## 2024-09-05 RX ADMIN — APIXABAN 5 MG: 5 TABLET, FILM COATED ORAL at 08:27

## 2024-09-05 RX ADMIN — LEVALBUTEROL HYDROCHLORIDE 1.25 MG: 1.25 SOLUTION RESPIRATORY (INHALATION) at 12:14

## 2024-09-05 RX ADMIN — SUCRALFATE 1 G: 1 TABLET ORAL at 11:13

## 2024-09-05 RX ADMIN — ATORVASTATIN CALCIUM 20 MG: 10 TABLET, FILM COATED ORAL at 22:38

## 2024-09-05 RX ADMIN — APIXABAN 5 MG: 5 TABLET, FILM COATED ORAL at 22:38

## 2024-09-05 RX ADMIN — FLUTICASONE FUROATE AND VILANTEROL TRIFENATATE 1 PUFF: 200; 25 POWDER RESPIRATORY (INHALATION) at 08:26

## 2024-09-05 RX ADMIN — GABAPENTIN 300 MG: 300 CAPSULE ORAL at 22:39

## 2024-09-05 RX ADMIN — LEVALBUTEROL HYDROCHLORIDE 1.25 MG: 1.25 SOLUTION RESPIRATORY (INHALATION) at 07:18

## 2024-09-05 RX ADMIN — SENNOSIDES AND DOCUSATE SODIUM 1 TABLET: 8.6; 5 TABLET ORAL at 08:28

## 2024-09-05 RX ADMIN — ACETYLCYSTEINE 2 ML: 200 SOLUTION ORAL; RESPIRATORY (INHALATION) at 12:14

## 2024-09-05 RX ADMIN — POTASSIUM CHLORIDE 40 MEQ: 1500 TABLET, EXTENDED RELEASE ORAL at 06:43

## 2024-09-05 RX ADMIN — LIDOCAINE 1 PATCH: 4 PATCH TOPICAL at 22:39

## 2024-09-05 RX ADMIN — ACETYLCYSTEINE 2 ML: 200 SOLUTION ORAL; RESPIRATORY (INHALATION) at 19:54

## 2024-09-05 RX ADMIN — FUROSEMIDE 15 MG/HR: 10 INJECTION, SOLUTION INTRAVENOUS at 20:24

## 2024-09-05 RX ADMIN — IPRATROPIUM BROMIDE 0.5 MG: 0.5 SOLUTION RESPIRATORY (INHALATION) at 07:17

## 2024-09-05 RX ADMIN — SUCRALFATE 1 G: 1 TABLET ORAL at 16:57

## 2024-09-05 RX ADMIN — IPRATROPIUM BROMIDE 0.5 MG: 0.5 SOLUTION RESPIRATORY (INHALATION) at 19:54

## 2024-09-05 RX ADMIN — DIGOXIN 125 MCG: 125 TABLET ORAL at 08:28

## 2024-09-05 RX ADMIN — ACETAZOLAMIDE 500 MG: 500 INJECTION, POWDER, LYOPHILIZED, FOR SOLUTION INTRAVENOUS at 16:49

## 2024-09-05 RX ADMIN — FUROSEMIDE 15 MG/HR: 10 INJECTION, SOLUTION INTRAVENOUS at 05:18

## 2024-09-05 ASSESSMENT — ACTIVITIES OF DAILY LIVING (ADL)
ADLS_ACUITY_SCORE: 51
ADLS_ACUITY_SCORE: 47
ADLS_ACUITY_SCORE: 51
ADLS_ACUITY_SCORE: 47
ADLS_ACUITY_SCORE: 47
ADLS_ACUITY_SCORE: 51

## 2024-09-05 NOTE — PLAN OF CARE
Problem: Gas Exchange Impaired  Goal: Optimal Gas Exchange  Intervention: Optimize Oxygenation and Ventilation  Recent Flowsheet Documentation  Taken 9/4/2024 2030 by Amanda Lara RN  Head of Bed (HOB) Positioning: HOB at 20-30 degrees  Taken 9/4/2024 1800 by Amanda Lara RN  Head of Bed (HOB) Positioning: HOB at 20-30 degrees  Taken 9/4/2024 1600 by Amanda Lara RN  Head of Bed (HOB) Positioning: HOB at 20-30 degrees     Problem: Dysrhythmia  Goal: Normalized Cardiac Rhythm  Outcome: Progressing  Intervention: Monitor and Manage Cardiac Rhythm Effect  Recent Flowsheet Documentation  Taken 9/4/2024 2030 by Amanda Lara RN  VTE Prevention/Management: SCDs on (sequential compression devices)     Problem: Comorbidity Management  Goal: Maintenance of COPD Symptom Control  Intervention: Maintain COPD Symptom Control  Recent Flowsheet Documentation  Taken 9/4/2024 2030 by Amanda Lara RN  Medication Review/Management: medications reviewed  Taken 9/4/2024 1600 by Amanda Lara RN  Medication Review/Management: medications reviewed  Goal: Blood Glucose Levels Within Targeted Range  Intervention: Monitor and Manage Glycemia  Recent Flowsheet Documentation  Taken 9/4/2024 2030 by Amanda Lara RN  Medication Review/Management: medications reviewed  Taken 9/4/2024 1600 by Amanda Lara RN  Medication Review/Management: medications reviewed  Goal: Blood Pressure in Desired Range  Intervention: Maintain Blood Pressure Management  Recent Flowsheet Documentation  Taken 9/4/2024 2030 by Amanda Lara RN  Medication Review/Management: medications reviewed  Taken 9/4/2024 1600 by Amanda Lara RN  Medication Review/Management: medications reviewed   Goal Outcome Evaluation:       Pt is alert and oriented x 4, per pt's son and daughter in law pt has occasional confusion this evening. When pt was asked, she answered the  orientation questions correctly. Lung sounds is diminished, clear in the upper lobes, fine crackles, diminished in the bases. Started the shift with O2 at 8 LPM per per [employer] then was weaned down to 4 LPM at 2110, SpO2 ranges 93 to 95%. HR in the 110's, Afib. She is on Dobutamine drip at 1.25 mcg/kg/min, also on Lasix drip at 15 ml/hr. Chairez in place, with good urine output. SCD in place. Turned and repositioned q 2 hours. Pt refused to eat at dinner time.

## 2024-09-05 NOTE — PROGRESS NOTES
[facility]    Medicine Progress Note - Hospitalist Service    Date of Admission:  8/17/2024    Assessment & Plan     Summary:  74-year-old female with history of a flutter, COPD, ongoing tobacco abuse, previous pleural effusions and chronic pain admitted 8/17/24 with empyema and transferred from [facility] to [facility] for surgical management.  Patient initially presented to [facility] on 8/10 with large right-sided effusion and found to have right-sided empyema with mucous plugging with strep pneumonia and E. coli pneumonia.  This does not drain well with chest tube and intrapleural lytics which is why patient was transferred to [facility] and underwent right VATS with chest tube placement on 8/19.  No decortication was done.  Patient was supposed to undergo bronchoscopy on 8/21 but was not done due to high O2 requirement.  Chest tube was removed on 8/25 subsequently.  Patient has had persistent loculated and complex appearing right effusion that has not changed.  Patient also had left-sided pleural effusion and 0.8 L was drained on 8/31. Patient is on antibiotics per ID recommendation.    Her stay here is complicated by development of new right-sided heart failure, persistent atrial flutter/fibrillation and anemia.  It has been challenging to diurese the patient given her ongoing hypotension.  Cardiology and EP are closely following.  Patient has been on intermittent diuresis and currently on Lasix drip as of 9/4.  Patient has had multiple rapid response and is currently full code.  Discussed with patient's son on 9/4 about the gradual decline of patient's clinical condition and palliative was consulted to assist with goals of care discussion.     Empyema  Acute hypoxic respiratory failure  H/O recurrent right-sided pleural effusion/empyema status post bronchoscopy on 8/11/2024 with cultures that grew E. coli and Candida albicans.  Pleural effusion cultures growing strep  pneumo  s/p VATS 8/19/24. No true emphyema or rind found, no ability to expand lung  Repeat CT 08/20 showed right pleural effusion s/p chest tube placement, moderate likely loculated/complicated residual as well as small associated right pneumothorax, reexpansion of right upper lobe with persistent collapse of right middle and lower lobe segments.  Significant retained secretions greater in the right lower lobe and increasing left pleural effusion  Plan was for bronchoscopy 08/21, not done as patient was requiring upto 10L oxygen and had high risk of being intubated  Chest tube removed 08/25   Acute hypoxemic respiratory failure worse 8/29-8/31 and likely related to eventual findings of moderate left pleural effusion and acute right sided systolic CHF   8/29: patient with complaints of increased SOB, productive cough and weakness. CXR 8/29 shows Increased opacification in the left lower lobe of concern for worsening pneumonia. Of note patient had remained afebrile and WBC remains normal on IV ceftriaxone for pneumococcus and E coli . Yeast on BAL thought to be colonization  Discussed with ID 8/29 and they recommend no changes  Overnight 8/29-8/30 patient with worsening hypoxemic respiratory failure.   CXR 8/30 with evidence of worsening pulmonary edema and possibly improved LLL opacity.  BNP elevated but improved from previous  CT chest 8/31 showed moderate left pleural effusion and stable changes on right post surgery  S/p left sided thoracentesis 8/31 with 0.8L of clear fluid removed and consistent with transudate  Consulted cardiology regarding difficult diuresis  TTE performed 8/31 and showed preserved LVEF with new evidence of decreased RV systolic function  -- Cardiology recommended holding diuresis given her pre-load dependence with acute right sided heart failure  -- Pulmonary consulted to assist with further cares  -- Continue current nebs and pulmonary hygiene  -- ID recommended no changes to current  antibiotic plan since current respiratory failure is volume related  -- Ceftriaxone completed 09/05. ID now signed off. Will follow up CT Chest with Pulmonary  ---Seen by SLP, recommend regular/thin liquids  ---Follow up in Pulmonary clinic 09/25, plan CT chest to be done prior to visit  -- Follow up with Surgery outpatient  -- Cards re-consulted 9/3 with ongoing issues with her volume status, diuresing as mentioned below  -- Baseline o2 use is 4L  -- Patient has been declining slowly and has had multiple rapid responses.Given the overall gradual decline,consulted palliative to assist with goals of care discussion.      Recurrent right sided pleural effuison: CXR 9/3 shows recurrent right sided pleural effusion that pulmonary thinks is too small to tap. There is evidence of great response to recent left sided thoracentesis  -- RR called 9/4, repeat chest xray pleural fluid/thickening throughout the periphery of right hemithorax  --      Paroxysmal A-fib/a flutter; status post PPM  Aortic stenosis status post TAVR  Acute on chronic HFpEF with new right sided acute systolic CHF  TTE 8/31 showed preserved EF with new evidence of decreased RV systolic function  -- Has been on multiple meds like metoprolol, diltiazem and digoxin with holding parameters. Dilt and metoprolol now stopped.   -- On Eliquis.  -- Cardiology signed off x 2, re-consulted on 9/3 and is following  -- Current regimen includes patient to be on dobutamine drip  -- She is off amiodarone due to QTc prolongation. S/p amiodarone bolus X 1 9/4  -- If continues to have elevated heart rate, can consider amiodarone drip with strict monitoring of her QT interval.  -- Patient diuresis was challenging given her hypotension, blood pressure currently stable.  -- Started on lasix drip 9/4. S/p metolazone X1  and now off. Good response to diuresis noted  -- Chairez placed 9/4 for accurate I/O monitoring.  -- Cardiology and EP following       Metabolic  alkalosis:  Suspect primary metabolic alkalosis due to Lasix and poor oral intake  -- Acetazolamide for contraction alkalosis    Pancytopenia  Acute on chronic anemia  --Noted Hb 6.8, s/p 1 U PRBC 9/4  --Monitor closely as pt on DOAC.  --No S/S of active bleeding, if Hb continues to drop will hold DOAC       Deconditioning   -- PT/OT recommend TCU      Hyperlipidemia: continue statin       DM2:  Not on home hypoglycemic meds  Last A1c 6.2  -- continue sliding scale insulin        Sacral erythema  Berna anal skin breakdown  -- Calmoseptine, WOC consulted       Epigastric pain  Denies nausea, vomting  -- continue PPI, Sucralfate,Tums prn and Maalox prn      Deconditioned due to acute medical illness  -PT/OT consulted    Goals of care  -Palliative consulted and will be meeting with pt/family later today.           Diet: Regular Diet Adult  Snacks/Supplements Adult: Glucerna; With Meals  Fluid restriction 1800 ML FLUID    DVT Prophylaxis: DOAC  Chairez Catheter: PRESENT, indication: Non-ICU: q2 hour intake/output with documentation  Lines: PRESENT      PICC 08/10/24 Triple Lumen Right Brachial vein medial-Site Assessment: WDL      Cardiac Monitoring: None  Code Status: Full Code      Clinically Significant Risk Factors              # Hypoalbuminemia: Lowest albumin = 2.2 g/dL at 8/18/2024  7:04 AM, will monitor as appropriate   # Thrombocytopenia: Lowest platelets = 105 in last 2 days, will monitor for bleeding   # Hypertension: Noted on problem list                 # Financial/Environmental Concerns: none   # Pacemaker present         Disposition Plan   Medically Ready for Discharge: Anticipated in 2-4 Days      Yasmine Gifford MD  Hospitalist Service  [facility]  Securely message with [email or web address] (more info)  Text page via [facility] Paging/Directory   ______________________________________________________________________    Interval History   Chart reviewed and events noted.  Patient seen and examined.    Denies SOB or chest pain. Laying in bed with eye closed and answering briefly. Multiple family by bedside    Physical Exam   Vital Signs: Temp: 98.4  F (36.9  C) Temp src: Oral BP: 110/56 Pulse: 94   Resp: 22 SpO2: 98 % O2 Device: Nasal cannula Oxygen Delivery: (S) 4 LPM  Weight: 140 lbs 13.98 oz    General: Elderly female in no distress  HEENT:EOMI, AT,NC  CVS: Irregularly irregular, tachycardia  RS: Diminished breath sounds at bases  Neurology: Wake, alert, oriented, moving all extremities  Psy: Calm         >50 MINUTES of critical time SPENT BY ME on the date of service doing chart review, history, exam, documentation & further activities per the note.    Plan discussed with patient, son over the phone,DIL by bedside,  cardiology, pulmonology MDs      Data

## 2024-09-05 NOTE — PROGRESS NOTES
HEART CARE NOTE          Assessment/Recommendations   1. HFpEF/RV dysfunction c/b cardiogenic shock  Assessment / Plan  Good response to aggressive diuresis - continue furosemide gtt and acetazolamide for contraction alkalosis; hold further metolazone for now; continue dobutamine gtt  Echo significant for RV dysfunction. However, RVSP/PASP as well as LV function noted to be wnl - repeat echo once near euvolemia; may benefit from CT PE protocol, +/- VQ scan and possible RHC + shunt run +/- vasodilator challenge to further evaluate isolated RV dysfunction  GDMT as detailed below; mainstay of treatment for HFpEF includes diuretics and adequate BP control (class I) and SGLT2-I (class 2a); additional medical therapy (ARNI, MRA, ARB) demonstrated less robust evidence for indication but may be considered per guideline recommendations (2b); no indication for Bblockers  GDMT medical therapy on hold given the above     2.  Valvular heart disease  Assessment / Plan  S/p TAVR - adequate function on recent imaging      3. Afib/flutter  Assessment / Plan  Continue digoxin, apixaban - metoprolol held while on inotrope; EP consulted for RVR - please see detailed notes and plan of care on chart     4. CAD  Assessment / Plan  Denies chest pain or anginal equivalents  S/p coronary angiogram significant for mild non-obstructive disease  Continue atorvastatin     5. HLP  Assessment / Plan  Currently on atorvastatin     6. Acute respiratory failure  Assessment / Plan  S/p thoracentesis (transudative) c/b lung empyema - management and supportive care per primary and pulmonary teams     7. DM2  Assessment / Plan  Management per primary team  Currently on ISS    Plan of care discussed on September 5, 2024 with primary team overseeing patient's care    Patient remains critically ill requiring hemodynamic support via dobutamine gtt in the setting of cardiogenic shock. 50 minutes spent on critical care.      History of Present  "Illness/Subjective    Ms. Mary Kay Tejada is a 74 year old female with a PMHx significant for (per Epic notation) paroxysmal atrial flutter on apixaban, COPD, hyperlipidemia, diabetes mellitus type 2 with neuropathy, reflux, dizziness, tobacco use, fibromyalgia and chronic pain syndrome, prior pleural effusions and most recent admission in July found with E. coli pneumonia and a right sided pleural effusion which was consistent with a transudate and negative cultures who presents with dyspnea     Today, Mrs. Tejada is somnolent; unable to evaluate ROS; Management plan as detailed above     ECG: Personally reviewed. nonspecific ST and T waves changes, atrial flutter, rate controlled.     ECHO (personnaly Reviewed on 9/3/24):   Left ventricular function is normal.The ejection fraction is 55-60%.  The right ventricle is mild to moderately dilated.  Moderately decreased right ventricular systolic function  The right atrium is moderately dilated.  There is a bioprosthetic aortic valve.  There is no paravalvular regurgitation present.  The study was technically difficult.    Telemetry: personally reviewed September 5, 2024; notable for afib     Lab results: personally reviewed September 5, 2024; notable for contraction alkalosis    Medical history and pertinent documents reviewed in Care Everywhere please where applicable see details above        Physical Examination Review of Systems   /54 (BP Location: Left arm)   Pulse 108   Temp 99.7  F (37.6  C) (Oral)   Resp 20   Ht 1.651 m (5' 5\")   Wt 63.9 kg (140 lb 14 oz)   LMP  (LMP Unknown)   SpO2 91%   BMI 23.44 kg/m    Body mass index is 23.44 kg/m .  Wt Readings from Last 3 Encounters:   09/05/24 63.9 kg (140 lb 14 oz)   08/16/24 72.2 kg (159 lb 1.6 oz)   07/15/24 65.3 kg (143 lb 14.4 oz)     General Appearance:   no distress, cachectic   ENT/Mouth: membranes moist, no oral lesions or bleeding gums.      EYES:  no scleral icterus, normal conjunctivae   Neck: " no carotid bruits or thyromegaly   Chest/Lungs:   lungs are clear to auscultation, no rales or wheezing, equal chest wall expansion    Cardiovascular:   Irregular. Normal first and second heart sounds with no murmurs, rubs, or gallops; the carotid, radial and posterior tibial pulses are intact, no JVD or LE edema bilaterally    Abdomen:  no organomegaly, masses, bruits, or tenderness; bowel sounds are present   Extremities: no cyanosis or clubbing   Skin: no xanthelasma, warm.    Neurologic: NAD     Psychiatric: somnolent    A complete 10 systems ROS was reviewed  And is negative except what is listed in the HPI.          Medical History  Surgical History Family History Social History   Past Medical History:   Diagnosis Date    Anemia     Aortic stenosis     Aortic valve disorder     Atrial fibrillation (H)     Atrial flutter (H)     Benign neoplasm of adenomatous polyp     large intestine     Chronic constipation     Chronic heart failure with preserved ejection fraction (H) 02/29/2024    Chronic pain syndrome     Congestive heart failure (H)     COPD (chronic obstructive pulmonary disease) (H)     Oxygen at night     Dependence on supplemental oxygen     Oxygen at noc, during the day as needed    Depression     Diabetes mellitus (H)     Dry eye syndrome     Fibromyalgia     Ganglion     left wrist    GERD (gastroesophageal reflux disease)     Hyperlipidemia     Hypertension     Hypokalemia     Infective otitis externa, unspecified     Created by Conversion     Larynx edema     Lung disease     Malignant neoplasm of vulva (H)     Created by Conversion [facility] Annotation: Apr 17 2007  [id number] - Cammy Bui:  resection per Dr. Alfonso Mane 9/06;  Replacement Utility updated for latest IMO load    Medial epicondylitis     Onychomycosis     Osteoarthritis     Peptic ulcer     Polyneuropathy     Vulvar malignant neoplasm (H)     Past Surgical History:   Procedure Laterality Date    BIOPSY BREAST Right      BIOPSY BREAST Right 01/28/2015    BIOPSY BREAST Right 01/28/2015    Procedure: RIGHT BREAST BIOPSY AFTER WIRE LOCALIZATION AT 0940;  Surgeon: Renée Soriano MD;  Location: [facility] OR;  Service:     BIOPSY OF BREAST, INCISIONAL      Description: Incisional Breast Biopsy;  Recorded: 11/13/2007;  Comments: benign    COLONOSCOPY N/A 06/14/2019    Procedure: COLONOSCOPY;  Surgeon: Eduardo Mora MD;  Location: [facility] OR;  Service: Gastroenterology    CV CORONARY ANGIOGRAM N/A 02/08/2024    Procedure: CV CORONARY ANGIOGRAM;  Surgeon: Moises Valencia MD;  Location: [facility]    CV LEFT HEART CATH N/A 02/08/2024    Procedure: Left Heart Catheterization;  Surgeon: Moises Valencia MD;  Location: [facility]    CV RIGHT HEART CATH MEASUREMENTS RECORDED N/A 02/08/2024    Procedure: Right Heart Catheterization;  Surgeon: Moises Valencia MD;  Location: [facility]    CV TRANSCATHETER AORTIC VALVE REPLACEMENT-FEMORAL APPROACH N/A 02/20/2024    Procedure: Transcatheter Aortic Valve Replacement, possible cardiopulmonary bypass, possible surgical intervention;  Surgeon: Moises Valencia MD;  Location: [facility]    EP PACEMAKER DEVICE & IMPLANT- HIS LEAD DUAL N/A 3/7/2024    Procedure: Pacemaker Device & Lead Implant - HIS Lead Dual;  Surgeon: Donnell Leon MD;  Location: [facility]    ESOPHAGOSCOPY, GASTROSCOPY, DUODENOSCOPY (EGD), COMBINED N/A 11/06/2018    Procedure: ESOPHAGOGASTRODUODENOSCOPY;  Surgeon: Lit Fernando MD;  Location: [facility] [place];  Service:     HYSTERECTOMY      JOINT REPLACEMENT Left     TKA    OR TRANSCATHETER AORTIC VALVE REPLACEMENT, FEMORAL PERCUTANEOUS APPROACH (STANDBY) N/A 02/20/2024    Procedure: OR TRANSCATHETER AORTIC VALVE REPLACEMENT, FEMORAL PERCUTANEOUS APPROACH (STANDBY);  Surgeon: Ishmael Farias MD;  Location: [facility]    PICC TRIPLE LUMEN PLACEMENT  01/12/2023         PICC  TRIPLE LUMEN PLACEMENT  8/10/2024    HI ABLATE HEART DYSRHYTHM FOCUS      Description: Catheter Ablation Atrial Fibrillation;  Recorded: 07/31/2012;  Comments: 7/24/12 PVI with Dr. Gardiner and nilay to all 5 pulm veins and CTI fl ablation line as well.    TRANSCATHETER AORTIC-VALVE REPLACEMENT      VIDEO-ASSISTED THORACOSCOPIC SURGERY (VATS) Right 8/19/2024    Procedure: VIDEO-ASSISTED RIGHT THORACOSCOPIC SURGERY WITH RIGHT PLEURAL FLUID CYTOLOGY;  Surgeon: Renée Soriano MD;  Location: [facility] [place] [place] OR    [facility] SUPRACERV ABD HYSTERECTOMY      Description: Supracervical Hysterectomy;  Proc Date: 01/01/1985;  Comments: some cervix left!; ovaries intact; done for bleeding    no family history of premature coronary artery disease Social History     Socioeconomic History    Marital status:      Spouse name: Not on file    Number of children: Not on file    Years of education: Not on file    Highest education level: Not on file   Occupational History    Not on file   Tobacco Use    Smoking status: Some Days     Current packs/day: 0.25     Types: Cigarettes     Passive exposure: Never    Smokeless tobacco: Never    Tobacco comments:     seen by TTS inpatient on 3/31/22   Vaping Use    Vaping status: Never Used   Substance and Sexual Activity    Alcohol use: Yes     Comment: Alcoholic Drinks/day: very little    Drug use: No    Sexual activity: Not on file   Other Topics Concern    Not on file   Social History Narrative    Not on file     Social Determinants of Health     Financial Resource Strain: Low Risk  (8/21/2024)    Financial Resource Strain     Within the past 12 months, have you or your family members you live with been unable to get utilities (heat, electricity) when it was really needed?: No   Food Insecurity: Low Risk  (8/21/2024)    Food Insecurity     Within the past 12 months, did you worry that your food would run out before you got money to buy more?: No     Within the past 12 months, did the food you  "bought just not last and you didn t have money to get more?: No   Transportation Needs: Low Risk  (8/21/2024)    Transportation Needs     Within the past 12 months, has lack of transportation kept you from medical appointments, getting your medicines, non-medical meetings or appointments, work, or from getting things that you need?: No   Physical Activity: Not on file   Stress: Not on file   Social Connections: Not on file   Interpersonal Safety: Low Risk  (8/21/2024)    Interpersonal Safety     Do you feel physically and emotionally safe where you currently live?: Yes     Within the past 12 months, have you been hit, slapped, kicked or otherwise physically hurt by someone?: No     Within the past 12 months, have you been humiliated or emotionally abused in other ways by your partner or ex-partner?: No   Housing Stability: Low Risk  (8/21/2024)    Housing Stability     Do you have housing? : Yes     Are you worried about losing your housing?: No           Lab Results    Chemistry/lipid CBC Cardiac Enzymes/BNP/TSH/INR   Lab Results   Component Value Date    CHOL 167 09/20/2023    HDL 75 09/20/2023    TRIG 83 09/20/2023    BUN 12.3 09/05/2024     09/05/2024    CO2 41 (H) 09/05/2024    Lab Results   Component Value Date    WBC 1.8 (L) 09/05/2024    HGB 8.4 (L) 09/05/2024    HCT 28.7 (L) 09/05/2024    MCV 86 09/05/2024     (L) 09/05/2024    Lab Results   Component Value Date    TROPONINI 0.07 05/24/2023     (H) 06/02/2023    TSH 2.80 08/10/2024    INR 1.81 (H) 08/31/2024     Lab Results   Component Value Date    TROPONINI 0.07 05/24/2023          Weight:    Wt Readings from Last 3 Encounters:   09/05/24 63.9 kg (140 lb 14 oz)   08/16/24 72.2 kg (159 lb 1.6 oz)   07/15/24 65.3 kg (143 lb 14.4 oz)       Allergies  Allergies   Allergen Reactions    Celebrex [Celecoxib] Rash     patient had butterfly rash - \"lupus-like\"      Latex Rash         Surgical History  Past Surgical History:   Procedure " Laterality Date    BIOPSY BREAST Right     BIOPSY BREAST Right 01/28/2015    BIOPSY BREAST Right 01/28/2015    Procedure: RIGHT BREAST BIOPSY AFTER WIRE LOCALIZATION AT 0940;  Surgeon: Renée Soriano MD;  Location: [facility] OR;  Service:     BIOPSY OF BREAST, INCISIONAL      Description: Incisional Breast Biopsy;  Recorded: 11/13/2007;  Comments: benign    COLONOSCOPY N/A 06/14/2019    Procedure: COLONOSCOPY;  Surgeon: Eduardo Mora MD;  Location: [facility] OR;  Service: Gastroenterology    CV CORONARY ANGIOGRAM N/A 02/08/2024    Procedure: CV CORONARY ANGIOGRAM;  Surgeon: Moises Valencia MD;  Location: [facility]    CV LEFT HEART CATH N/A 02/08/2024    Procedure: Left Heart Catheterization;  Surgeon: Moises Valencia MD;  Location: [facility]    CV RIGHT HEART CATH MEASUREMENTS RECORDED N/A 02/08/2024    Procedure: Right Heart Catheterization;  Surgeon: Moises Valencia MD;  Location: [facility]    CV TRANSCATHETER AORTIC VALVE REPLACEMENT-FEMORAL APPROACH N/A 02/20/2024    Procedure: Transcatheter Aortic Valve Replacement, possible cardiopulmonary bypass, possible surgical intervention;  Surgeon: Moises Valencia MD;  Location: [facility]    EP PACEMAKER DEVICE & IMPLANT- HIS LEAD DUAL N/A 3/7/2024    Procedure: Pacemaker Device & Lead Implant - HIS Lead Dual;  Surgeon: Donnell Leon MD;  Location: [facility]    ESOPHAGOSCOPY, GASTROSCOPY, DUODENOSCOPY (EGD), COMBINED N/A 11/06/2018    Procedure: ESOPHAGOGASTRODUODENOSCOPY;  Surgeon: Lit Fernando MD;  [place] [facility] [place]:     HYSTERECTOMY      JOINT REPLACEMENT Left     TKA    OR TRANSCATHETER AORTIC VALVE REPLACEMENT, FEMORAL PERCUTANEOUS APPROACH (STANDBY) N/A 02/20/2024    Procedure: OR TRANSCATHETER AORTIC VALVE REPLACEMENT, FEMORAL PERCUTANEOUS APPROACH (STANDBY);  Surgeon: Ishmael Farias MD;  Location: [facility]    PICC TRIPLE  LUMEN PLACEMENT  01/12/2023         PICC TRIPLE LUMEN PLACEMENT  8/10/2024    GA ABLATE HEART DYSRHYTHM FOCUS      Description: Catheter Ablation Atrial Fibrillation;  Recorded: 07/31/2012;  Comments: 7/24/12 PVI with Dr. Gardiner and nilay to all 5 pulm veins and CTI fl ablation line as well.    TRANSCATHETER AORTIC-VALVE REPLACEMENT      VIDEO-ASSISTED THORACOSCOPIC SURGERY (VATS) Right 8/19/2024    Procedure: VIDEO-ASSISTED RIGHT THORACOSCOPIC SURGERY WITH RIGHT PLEURAL FLUID CYTOLOGY;  Surgeon: Renée Soriano MD;  Location: [facility] OR    [facility] SUPRACERV ABD HYSTERECTOMY      Description: Supracervical Hysterectomy;  Proc Date: 01/01/1985;  Comments: some cervix left!; ovaries intact; done for bleeding       Social History  Tobacco:   History   Smoking Status    Some Days    Types: Cigarettes   Smokeless Tobacco    Never    Alcohol:   Social History    Substance and Sexual Activity      Alcohol use: Yes        Comment: Alcoholic Drinks/day: very little   Illicit Drugs:   History   Drug Use No       Family History  Family History   Problem Relation Age of Onset    Heart Failure Mother     Cancer Other         paternal HX-laryngeal     Alcoholism Sister     No Known Problems Daughter     No Known Problems Maternal Grandmother     No Known Problems Maternal Grandfather     No Known Problems Paternal Grandmother     No Known Problems Paternal Grandfather     No Known Problems Maternal Aunt     No Known Problems Paternal Aunt     Alcoholism Sister     Alcoholism Brother     Alcoholism Father     Cancer Paternal Uncle         Gastric-Alcohol    Cancer Paternal Uncle         gastric-Alcohol    Hereditary Breast and Ovarian Cancer Syndrome No family hx of     Breast Cancer No family hx of     Colon Cancer No family hx of     Endometrial Cancer No family hx of     Ovarian Cancer No family hx of           Thom Robert MD on 9/5/2024      cc: Cammy Bui

## 2024-09-05 NOTE — PROGRESS NOTES
Pulmonary Progress Note  9/4/2024      Admit Date: 8/17/2024  CODE: Full Code    Reason for Consult: acute on chronic respiratory failure with hypoxemia, s/p chest tube, attempted VATS but no decortication done; afib/RVR, volume overload.     Assessment/Plan:   74F w/ active tobacco dependence, emphysema and presumed COPD with no formal PFTs, chronic hypoxemic resp failure on home O2, DM, fibromyalgia, moderate aortic stenosis, HFpEF, presented to [facility] on 8/10 with large right sided effusion; found to have right sided empyema and right sided mucus plugging with Strep pneumo and E coli pneumonia, did not drain well with chest tube and intrapleural lytics, with persistent opacification of the right hemithorax, transferred to [facility] and underwent right VATS with chest tube placement on 8/19. NO decortication was done at that time. Chest tube was removed by surgery on 8/25.  She has persistent loculated and complex appearing right effusion that has not changed. She also underwent left thoracentesis on 8/31 for 800cc clear yellow fluid; this was transudative based on fluid analysis.  Bedside lung/pleural POCUS on 9/4 during her rapid response showed above findings in the right pleural space, and a small left pleural effusion that appeared free flowing; risk/benefit does not favor additional left thoracentesis at this time.   Etiology for recurrent decompensations is likely multifactorial but suspect volume overload is a major contributor. Accurate ins/outs not available at this. Weight is down 10lbs since 8/19 but she was at [facility] prior to this so unclear what her initial weight was.   She has responded very well to aggressive diuresis over the last 24 hours, with furosemide infusion, metolazone, and acetazolamide with dramatic improvement in respiratory status, HR and volume status. Renal function remains normal.     As noted yesterday: overall she remains very frail and deconditioned. The right pleural  space process has not resolved and another VATS or chest tube is unlikely to be beneficial nor tolerated. This may drive recurrent episodes of decompensation characterized by recurrent hypoxemic respiratory failure, decompensation, afib, etc. She's also at risk for worsening sepsis from various infectious issues and the complex right pleural space process.  Palliative care team is now involved which is appropriate.      Plan:  - titrate FiO2 for goal SpO2 88-92%, avoid hyperoxia, has home oxygen  - encourage OOB, PT/OT, push IS. I haven't seen her out of bed yet. Can she do some PT today?  - recommend continuing aggressive diuresis with furosemide infusion, metolazone. Def[place] to [facility] and [place].  - HR control per EP, cardiology teams.  - continue levalbuterol + ipratroprium nebs QID; flutter valve.   - stopped the volara treatments on 9/4 as she was not tolerating these  - continue acetylcysteine nebs QIS  - continue 3% sodium chloride nebs QID  - abx per ID  - has follow up in our pulmonary clinic on 9/25 with CT chest prior to visit.  - appreciate palliative care team's involvement.    Nothing further to add from pulmonary standpoint. We'll sign off. Please call with additional questions, or re-consult is if there are new issues.    Addy (Nelson) MD [place]  [facility] ([facility])  Send me a secure message using [email or web address]  Clinic [phone]  Fax [phone]     Subjective/Interim Events:   She looks much better today. HR better.  Down to 4Lpm nc with SpO2 low to mid 90s.   Almost 8L urine out with furosemide drip, metolazone, acetazolamide.   She has no complaints.       Medications:     Current Facility-Administered Medications   Medication Dose Route Frequency Provider Last Rate Last Admin    DOBUTamine (DOBUTREX) 500 mg in D5W 250 mL infusion (adult std conc)  1.25 mcg/kg/min Intravenous Continuous Thom Robert MD 2.5 mL/hr at 09/04/24 2300 1.25 mcg/kg/min at  09/04/24 2300    furosemide (LASIX) 100 mg in sodium chloride 0.9 % 100 mL infusion  15 mg/hr Intravenous Continuous Thom Robert MD 15 mL/hr at 09/05/24 1250 15 mg/hr at 09/05/24 1250     Current Facility-Administered Medications   Medication Dose Route Frequency Provider Last Rate Last Admin    acetaZOLAMIDE (DIAMOX) injection 500 mg  500 mg Intravenous Q12H Thom Robert MD   500 mg at 09/05/24 0335    acetylcysteine (MUCOMYST) 20 % nebulizer solution 2 mL  2 mL Nebulization 4x Daily Donte Dutta DO   2 mL at 09/05/24 1214    apixaban ANTICOAGULANT (ELIQUIS) tablet 5 mg  5 mg Oral BID Lauren Willosn MD   5 mg at 09/05/24 0827    artificial saliva (BIOTENE MT) solution 1 spray  1 spray Mouth/Throat 4x Daily Renée Soriano MD   1 spray at 09/05/24 0828    atorvastatin (LIPITOR) tablet 20 mg  20 mg Oral At Bedtime Renée Soriano MD   20 mg at 09/04/24 2124    digoxin (LANOXIN) tablet 125 mcg  125 mcg Oral Daily Renée Soriano MD   125 mcg at 09/05/24 0828    [Held by provider] diltiazem ER COATED BEADS (CARDIZEM CD/CARTIA XT) 24 hr capsule 120 mg  120 mg Oral Daily Renée Soriano MD   120 mg at 09/04/24 0638    fluticasone-vilanterol (BREO ELLIPTA) 200-25 MCG/ACT inhaler 1 puff  1 puff Inhalation Daily Renée Soriano MD   1 puff at 09/05/24 0826    gabapentin (NEURONTIN) capsule 600 mg  600 mg Oral TID Renée Soriano MD   600 mg at 09/05/24 0827    insulin aspart (NovoLOG) injection (RAPID ACTING)  1-10 Units Subcutaneous TID AC Renée Soriano MD   3 Units at 09/02/24 1711    insulin aspart (NovoLOG) injection (RAPID ACTING)  1-7 Units Subcutaneous At Bedtime Renée Soriano MD   3 Units at 09/02/24 2055    levalbuterol (XOPENEX) neb solution 1.25 mg  1.25 mg Nebulization 4x Daily Aamir Ross, RT   1.25 mg at 09/05/24 1214    And    ipratropium (ATROVENT) 0.02 % neb solution 0.5 mg  0.5 mg Nebulization 4x daily Aamir Ross, RT   0.5 mg at 09/05/24 1214    Lidocaine (LIDOCARE)  "4 % Patch 1 patch  1 patch Transdermal Q24h Antonia Baptiste MD   1 patch at 09/04/24 2023    [Held by provider] metoprolol tartrate (LOPRESSOR) tablet 25 mg  25 mg Oral BID Donte Dutta DO        pantoprazole (PROTONIX) EC tablet 40 mg  40 mg Oral QAM AC Renée Soriano MD   40 mg at 09/05/24 0643    polyethylene glycol (MIRALAX) Packet 17 g  17 g Oral Daily Renée Soriano MD   17 g at 09/05/24 0828    senna-docusate (SENOKOT-S/PERICOLACE) 8.6-50 MG per tablet 1 tablet  1 tablet Oral BID Rneée Soriano MD   1 tablet at 09/05/24 0828    sodium chloride (NEBUSAL) 3 % neb solution 3 mL  3 mL Nebulization 4x daily Carmelita Buckley MD   3 mL at 09/05/24 1215    sodium chloride (PF) 0.9% PF flush 10 mL  10 mL Intracatheter Q8H Lauren Willson MD   10 mL at 09/05/24 0829    sodium chloride (PF) 0.9% PF flush 10-40 mL  10-40 mL Intracatheter Q7 Days Renée Soriano MD   30 mL at 08/31/24 1754    sterile talc (STERITALC) powder for sclerosing 4 g  4 g INTRAPLEURAL Once Renée Soriano MD        sucralfate (CARAFATE) tablet 1 g  1 g Oral TID AC Renée Soriano MD   1 g at 09/05/24 1113         Exam/Data:   Vitals  /56 (BP Location: Left arm)   Pulse 94   Temp 98.4  F (36.9  C) (Oral)   Resp 22   Ht 1.651 m (5' 5\")   Wt 63.9 kg (140 lb 14 oz)   LMP  (LMP Unknown)   SpO2 98%   BMI 23.44 kg/m       I/O last 3 completed shifts:  In: 1198.16 [P.O.:380; I.V.:496.16]  Out: 7550 [Urine:7550]  Weight change: -1.8 kg (-3 lb 15.5 oz)  [unfilled]  Resp: 22    EXAM:  Physical Exam  Gen: awake, alert, oriented, no distress  HEENT: NT, no AUGUST  CV: RRR, no m/g/r  Resp: diminished at right and left bases.   Abd: soft, nontender, BS+  Skin: no rashes or lesions  Ext: no edema  Neuro: PERRL, nonfocal exam    ROS:  A 10-system review was obtained and is negative with the exception of the symptoms noted above.    DATA:    PFT DATA none available        Micro  PCT wnl on 8/10  Viral swabs neg     Bronch/BAL 8/11  Culture + " for e. Coli, pan-sensitive  1+ yeast, candida albicans  Total nuc cells 22, 65% PMN, 9% lymphs, 20% lining cells  Cytology neg for malignancy     Pleural fluid 8/11  Culture + for strep pneumo, pan-sensitive  Total nuc cells 1451  73% PMN, 22% lymphs, 5% mono/mac  Glucose 159    Protein 3.3  TG 19  Cyto from 8/19 neg for malignancy  PJP pCR neg    Pleural fluid 8/31: left pleural effusion  Total nuc cells 295; 67% mono/mac, 11% lining. 5% PMN. 17% lymphs.  Glucose 135  LDH 87  Protein 1.4  Culture pending, gram stain neg  Cytology neg for malignancy.     Sputum 8/12  2+ strep pneumo  1+ e. Coli, pan-sensitive    IMAGING:   XR Chest Port 1 View    Result Date: 8/18/2024  EXAM: XR CHEST PORT 1 VIEW LOCATION: [facility] DATE: 8/18/2024 INDICATION: Recheck chest tube and pleural effusions COMPARISON: 8/17/2024     IMPRESSION: No change since yesterday. Small caliber chest tube projected at the right lung base unchanged. Complete opacification of the right hemithorax with volume loss unchanged. Right PICC line tip in low SVC. Transvenous pacemaker. Hyperinflation left lung.    POC US Chest B-Scan    Limited Bedside Lung Ultrasound, performed and interpreted by me. Indication: empyema and dyspnea With the patient positioned supine, right posterior and lateral lung fields were examined for evidence of thoracic free fluid, pulmonary consolidation, and pulmonary edema. Findings: moderate right pleural effusion noted, mostly free flowing. Some debris noted (plankton sign) Chest tube visualized in the pleural effusion. Right lung atelectasis noted. No obvious loculations although scanning was limited due to presence of dressing. IMPRESSION: moderate to large right pleural effusion with chest tube in place. No obvious loculations noted on this limited bedside pleural/lung POCUS.      XR Chest Port 1 View    Result Date: 8/17/2024  EXAM: XR CHEST PORT 1 VIEW LOCATION: [facility]  HOSPITAL DATE: 8/17/2024 INDICATION: Recheck chest tube and pleural effusions COMPARISON: CT chest without contrast 08/16/2024, chest radiograph 08/15/2024     IMPRESSION: Stable position of the right basilar pigtail chest tube. Stable complete opacification of the right hemithorax with rib crowding compatible with large pleural effusion and associated collapse of the right lung. Right upper extremity PICC line  with tip overlying the mid aspect of the SVC. Cardiomediastinal silhouette is partially obscured by the above process however appears grossly stable. Aortic valve replacement. Stable position of the left chest pacemaker. Trace left pleural effusion. Streaky opacities at the left lung base favoring atelectasis. No pneumothorax. Unchanged osseous structures.

## 2024-09-05 NOTE — PLAN OF CARE
"Goal Outcome Evaluation:    Problem: Gas Exchange Impaired  Goal: Optimal Gas Exchange  Outcome: Not Progressing     Problem: Adult Inpatient Plan of Care  Goal: Plan of Care Review  Description: The Plan of Care Review/Shift note should be completed every shift.  The Outcome Evaluation is a brief statement about your assessment that the patient is improving, declining, or no change.  This information will be displayed automatically on your shift  note.  Outcome: Progressing  Flowsheets (Taken 9/5/2024 1500)  Plan of Care Reviewed With: patient  Overall Patient Progress: improving  Goal: Patient-Specific Goal (Individualized)  Description: You can add care plan individualizations to a care plan. Examples of Individualization might be:  \"Parent requests to be called daily at 9am for status\", \"I have a hard time hearing out of my right ear\", or \"Do not touch me to wake me up as it startles  me\".  Outcome: Progressing  Goal: Absence of Hospital-Acquired Illness or Injury  Outcome: Progressing  Intervention: Prevent and Manage VTE (Venous Thromboembolism) Risk  Recent Flowsheet Documentation  Taken 9/5/2024 0811 by Negin Call RN  VTE Prevention/Management: SCDs on (sequential compression devices)  Goal: Optimal Comfort and Wellbeing  Outcome: Progressing  Intervention: Provide Person-Centered Care  Recent Flowsheet Documentation  Taken 9/5/2024 0811 by Negin Call RN  Trust Relationship/Rapport:   choices provided   emotional support provided   questions encouraged  Goal: Readiness for Transition of Care  Outcome: Progressing     Problem: Dysrhythmia  Goal: Normalized Cardiac Rhythm  Outcome: Progressing  Intervention: Monitor and Manage Cardiac Rhythm Effect  Recent Flowsheet Documentation  Taken 9/5/2024 0811 by Negin Call RN  VTE Prevention/Management: SCDs on (sequential compression devices)     Problem: Comorbidity Management  Goal: Maintenance of COPD Symptom Control  Outcome: " Progressing  Intervention: Maintain COPD Symptom Control  Recent Flowsheet Documentation  Taken 9/5/2024 0811 by Negin Call RN  Supportive Measures: active listening utilized  Goal: Blood Glucose Levels Within Targeted Range  Outcome: Progressing  Goal: Blood Pressure in Desired Range  Outcome: Progressing     Problem: Risk for Delirium  Goal: Optimal Coping  Outcome: Progressing  Intervention: Optimize Psychosocial Adjustment to Delirium  Recent Flowsheet Documentation  Taken 9/5/2024 0811 by Negin Call RN  Supportive Measures: active listening utilized  Goal: Improved Behavioral Control  Outcome: Progressing  Intervention: Minimize Safety Risk  Recent Flowsheet Documentation  Taken 9/5/2024 0811 by Negin Call RN  Trust Relationship/Rapport:   choices provided   emotional support provided   questions encouraged  Goal: Improved Attention and Thought Clarity  Outcome: Progressing  Goal: Improved Sleep  Outcome: Progressing     Problem: Respiratory Compromise (Pneumothorax)  Goal: Optimal Oxygenation and Ventilation  Outcome: Progressing     Problem: Skin Injury Risk Increased  Goal: Skin Health and Integrity  Outcome: Progressing  Intervention: Plan: Nurse Driven Intervention: Moisture Management  Recent Flowsheet Documentation  Taken 9/5/2024 0811 by Negin Call RN  Moisture Interventions: Urinary collection device  Plan: Moisture Management: urinary collection device  Taken 9/5/2024 0800 by Negin Call RN  Moisture Interventions: Urinary collection device  Intervention: Plan: Nurse Driven Intervention: Friction and Shear  Recent Flowsheet Documentation  Taken 9/5/2024 0811 by Negin Call RN  Friction/Shear Interventions:   Silicone foam sacral dressing   HOB 30 degrees or less     Problem: Mobility Impairment  Goal: Optimal Mobility  Outcome: Progressing     Problem: Fluid Volume Excess  Goal: Fluid Balance  Outcome: Progressing     Problem: Pain Acute  Goal: Optimal  Pain Control and Function  Outcome: Progressing  Intervention: Optimize Psychosocial Wellbeing  Recent Flowsheet Documentation  Taken 9/5/2024 0811 by Negin Call RN  Supportive Measures: active listening utilized     Problem: Infection  Goal: Absence of Infection Signs and Symptoms  Outcome: Progressing         Plan of Care Reviewed With: patient    Overall Patient Progress: improving Overall Patient Progress: improving    Pt A&O x 4 with some drowsiness today. VSS on 4L NC. Tele is NSR with PACs. Pt is denying pain today. Repositioned every 2 hours, RN offered to get pt up to chair, but she refused. Continues Lasix and dobutamine drips. Good output via [place].

## 2024-09-05 NOTE — PROGRESS NOTES
INFECTIOUS DISEASE FOLLOW UP NOTE      ASSESSMENT:  Empyema, Right.  History of COPD.  History of aortic stenosis status post TAVR.  Status post pacemaker placement  Recent history of COVID-19 infection complicated with secondary bacterial pneumonia in middle July 2024.  Sputum cultures on 7/13/2024 with E. coli.  Right upper lobe collapse status post thoracentesis on 7/15/2024.    Readmitted with recurrent right-sided pleural effusion status post bronchoscopy on 8/11/2024.  Cultures with E. coli and Candida albicans.  Pleural effusion cultures positive with Streptococcus pneumoniae.   Sputum positive S pneumo.  Chest tube placed 8/11/24. Now s/p VATS, dr Soriano, 8/19  Improving, CXR looking better.      Yeast from BAL typically colonizer.     Worsening oxygenation 8/29-. Afebrile, normal WBC last check. Repeat CXR suggests interstitial edema. CT chest with increased size in pleural effusion, noted sign of pulmonary edema. Sputum culture negative. Thoracentesis 8/31 -- 800cc, transudate. No growth.    Leukopenia, likely drug related     PLAN:  Discontinue ceftriaxone    Followup pulmonary clinic  Discussed with nursing and staff    Will sign off, please call with questions    Mary Garcia M.D.    ______________________________________________________________________    SUBJECTIVE / INTERVAL HISTORY:    She feels fine    Radiology personally reviewed  Cxr  MPRESSION: Pleural fluid and/or thickening throughout the periphery of the right hemithorax most marked inferiorly with associated near complete atelectasis basilar right lower lobe and milder atelectasis throughout the periphery of the mid and upper   right lung. Minimal left basilar pleural fluid and atelectasis. Mild interstitial edema in the mid and lower lungs. Emphysematous change in the mid and upper lungs.     Mild cardiac enlargement, pulmonary venous congestion, and central pulmonary arterial enlargement. Transcatheter aortic valve replacement. Pacer  "anterior left chest wall with lead tips in the RA and RV. Right PICC tip low SVC.     Overall no significant change.    ROS: deconditioned. All other systems negative except as listed above.        OBJECTIVE:  /56 (BP Location: Left arm)   Pulse 94   Temp 98.4  F (36.9  C) (Oral)   Resp 22   Ht 1.651 m (5' 5\")   Wt 63.9 kg (140 lb 14 oz)   LMP  (LMP Unknown)   SpO2 98%   BMI 23.44 kg/m         Vital Signs  Temp: 97.4  F (36.3  C)  Temp src: Oral  Resp: 20  Pulse: 82  Pulse Rate Source: Monitor  BP: 102/59  BP Location: Left arm    Temp (24hrs), Av.3  F (36.8  C), Min:97.4  F (36.3  C), Max:99.1  F (37.3  C)      GEN: No acute distress.  HFNC  RESPIRATORY:  Chest tube on right removed. normal respiratory effort.   CARDIOVASCULAR:  regular  ABDOMEN:  mildly tender RUQ  EXTREMITIES: No edema.  SKIN/HAIR/NAILS:  No rashes  IV: PICC        Antibiotics:  ceftriaxone  On either zosyn or ceftri since 8/10  Off fluconazole    Pertinent labs:    Recent Labs   Lab 24  0517 24  0539 24  0547   WBC 1.8* 1.6* 2.5*   HGB 8.4* 6.8* 7.4*   HCT 28.7* 24.4* 26.2*   * 105* 122*        Recent Labs   Lab 24  0517 24  0539 24  0547    142 140   CO2 41* 38* 39*   BUN 12.3 10.5 11.2          Lab Results   Component Value Date    ALT 31 2024    AST 20 2024    ALKPHOS 105 2024         MICROBIOLOGY DATA:  Susceptibility data from last 90 days.  Collected Specimen Info Organism Ampicillin Ampicillin/Sulbactam Azithromycin Cefepime Ceftazidime Ceftriaxone Ceftriaxone (meningitis) Ceftriaxone (non-meningitis) Ciprofloxacin Clindamycin Gentamicin Levofloxacin Meropenem   24 Sputum from Expectorate Normal anna marie                24 Bronchial Alveolar Lavage from Lung, Upper Lobe, Right Escherichia coli                  Normal anna marie                24 Bronchial Alveolar Lavage from Lung, Right Yeast                24 Bronchial Alveolar Lavage " from Lung, Lower Lobe, Right Escherichia coli                  Normal anna marie                08/11/24 Bronchial Alveolar Lavage from Lung, Right Candida albicans                08/10/24 Sputum from Expectorate Streptococcus pneumoniae    S     S  S   S   S      Escherichia coli  S  S   S  S  S    S   S  S  S   08/10/24 Pleural fluid from Pleural Cavity, Right Streptococcus pneumoniae    S     S  S   S   S    07/13/24 Sputum from Expectorate Escherichia coli  S  S   S  S  S    S   S  S  S     Normal anna marie                  Collected Specimen Info Organism Penicillin (meningitis) Penicillin (non-meningitis) Penicillin(oral) Piperacillin/Tazobactam Tobramycin Trimethoprim/Sulfamethoxazole  Vancomycin   08/30/24 Sputum from Expectorate Normal anna marie          08/11/24 Bronchial Alveolar Lavage from Lung, Upper Lobe, Right Escherichia coli            Normal anna marie          08/11/24 Bronchial Alveolar Lavage from Lung, Right Yeast          08/11/24 Bronchial Alveolar Lavage from Lung, Lower Lobe, Right Escherichia coli            Normal anna marie          08/11/24 Bronchial Alveolar Lavage from Lung, Right Candida albicans          08/10/24 Sputum from Expectorate Streptococcus pneumoniae  S  S  S     S     Escherichia coli     S  S  S    08/10/24 Pleural fluid from Pleural Cavity, Right Streptococcus pneumoniae  S  S  S     S   07/13/24 Sputum from Expectorate Escherichia coli     S  S  S      Normal anna marie            8/30 sputum -- low PMNs, low s epi cells    RADIOLOGY:  XR Chest Port 1 View    Result Date: 9/4/2024  EXAM: XR CHEST PORT 1 VIEW LOCATION: [facility] DATE: 9/4/2024 INDICATION: SOB. COMPARISON: 9/3/2024 CXR, 8/31/2024 CT chest, and 8/30/2024 CXR.     IMPRESSION: Pleural fluid and/or thickening throughout the periphery of the right hemithorax most marked inferiorly with associated near complete atelectasis basilar right lower lobe and milder atelectasis throughout the periphery of the mid and  upper right lung. Minimal left basilar pleural fluid and atelectasis. Mild interstitial edema in the mid and lower lungs. Emphysematous change in the mid and upper lungs. Mild cardiac enlargement, pulmonary venous congestion, and central pulmonary arterial enlargement. Transcatheter aortic valve replacement. Pacer anterior left chest wall with lead tips in the RA and RV. Right PICC tip low SVC. Overall no significant change.     XR Chest Port 1 View    Result Date: 9/3/2024  EXAM: XR CHEST PORT 1 VIEW LOCATION: [facility] DATE: 9/3/2024 INDICATION: Follow up pleural effusions COMPARISON: Multiple priors, most recent 8/30/2024. CT 8/31/2024.     IMPRESSION: Since the CT of 8/31/2024 there is been significant interval decrease in left-sided pleural fluid. No significant change in moderate right-sided pleural fluid with chronic atelectasis right lung base. Cardiac enlargement. AVR. Mild pulmonary vascular congestion. Right-sided PICC with the tip in the mid SVC. Left-sided cardiac pacemaker. Marked degenerative osteoarthrosis right glenohumeral joint.    US Thoracentesis    Result Date: 8/31/2024  EXAM: 1. LEFT THORACENTESIS 2. ULTRASOUND GUIDANCE LOCATION: [facility] DATE: 8/31/2024 INDICATION: Pleural effusion. PROCEDURE: Informed consent obtained. Time out performed. The chest was prepped and draped in sterile fashion. 6 mL of 1 % lidocaine was infused into the local soft tissues. Under direct ultrasound guidance, a 5 Tongan catheter system was placed into the  pleural effusion. 0.8 liters of clear anders-colored fluid were removed and sent to lab, if requested. Patient tolerated procedure well. Ultrasound imaging was obtained and placed in the patient's permanent medical record.     IMPRESSION: Status post left ultrasound-guided thoracentesis. Reference CPT Code: 31439    Echocardiogram Limited    Result Date: 8/31/2024  [id number]  [id number]^COTY^JENNIFER^[place] [address]  Name: ALVAREZ HINDS MRN: [id number] : 1950 Study Date: 2024 10:27 AM Age: 74 yrs Gender: Female Patient Location: [facility] Reason For Study: Dyspnea History: TAVR (23 mm Douglas Mai 3 Resilia) Ordering Physician: JENNIFER ANSARI Referring Physician: MAL OVERTON Performed By: NATALIE  BSA: 1.8 m2 Height: 65 in Weight: 156 lb HR: 86 BP: 104/55 mmHg ______________________________________________________________________________ Procedure Limited Portable Echo Adult. Definity (NDC #83228-231) given intravenously. ______________________________________________________________________________ Interpretation Summary  Left ventricular function is normal.The ejection fraction is 55-60%. The right ventricle is mild to moderately dilated. Moderately decreased right ventricular systolic function The right atrium is moderately dilated. There is a bioprosthetic aortic valve. There is no paravalvular regurgitation present. The study was technically difficult. ______________________________________________________________________________ Left Ventricle The left ventricle is normal in size. Left ventricular function is normal.The ejection fraction is 55-60%. There is normal left ventricular wall thickness. No regional wall motion abnormalities noted.  Right Ventricle The right ventricle is mild to moderately dilated. Moderately decreased right ventricular systolic function. There is a pacemaker lead in the right ventricle.  Atria The left atrium is mildly dilated. The right atrium is moderately dilated. There is no color Doppler evidence of an atrial shunt.  Mitral Valve There is mild mitral annular calcification.  Tricuspid Valve Right ventricle systolic pressure estimate normal.  Aortic Valve There is a bioprosthetic aortic valve. There is no paravalvular regurgitation present.  Pulmonic Valve The pulmonic valve is not  well seen, but is grossly normal. This degree of valvular regurgitation is within normal limits.  Vessels The aorta root is normal. Normal size ascending aorta. IVC diameter and respiratory changes fall into an intermediate range suggesting an RA pressure of 8 mmHg.  Pericardium There is no pericardial effusion.  ______________________________________________________________________________ MMode/2D Measurements & Calculations IVSd: 0.78 cm LVIDd: 5.0 cm LVIDs: 3.4 cm LVPWd: 0.79 cm FS: 32.5 % LV mass(C)d: 133.7 grams LV mass(C)dI: 75.1 grams/m2  Ao root diam: 2.4 cm LA dimension: 3.4 cm LA/Ao: 1.4 LVOT diam: 1.9 cm LVOT area: 2.8 cm2 Ao root diam index Ht(cm/m): 1.5 Ao root diam index BSA (cm/m2): 1.3 RV Base: 4.4 cm RWT: 0.32 TAPSE: 1.0 cm  ______________________________________________________________________________ Report approved by: Diamante Quintero 08/31/2024 12:33 PM       CT Chest w/o Contrast    Result Date: 8/31/2024  EXAM: CT CHEST WITHOUT CONTRAST LOCATION: [facility] DATE: 08/31/2024 INDICATION: Right lower lobe infiltrate status post VATS, now with increasing oxygen requirements. COMPARISON: CT chest 08/20/2024. TECHNIQUE: CT chest without IV contrast. Multiplanar reformats were obtained. Dose reduction techniques were used. CONTRAST: None. FINDINGS: LUNGS AND PLEURA: Moderate left pleural fluid is slightly larger. No left pneumothorax. Interval removal of right chest tube. There is persistent complex small volume right pleural effusion with small pneumothorax gas at the right chest base. The pneumothorax gas is smaller in size in the interval. Some apparent debris at the pleural fluid posteriorly is again suggested on series 2 image 47. Moderate right and left base atelectasis. Small aspirated material within the right main bronchus image 58, though the volume of aspirated material appears decreased in the interval. Emphysematous changes. Mildly increased hazy opacities  at the left upper lobe. Mild smooth interstitial thickening bilaterally. MEDIASTINUM/AXILLAE: Scattered thoracic aortic calcifications without acute abnormality. Stable small mediastinal lymph nodes. No new acute mediastinal abnormality. Left chest pacer. Right PICC line tip at the mid SVC. TAVR. CORONARY ARTERY CALCIFICATION: Severe. UPPER ABDOMEN: Stable hypodense enlargement of the bilateral adrenals. In particular, the left adrenal is 3.4 cm. Low-density suggests adenomas. No acute upper abdominal abnormality. MUSCULOSKELETAL: Diffuse degenerative changes of the spine. No focal bony abnormality.     IMPRESSION: 1.  Removal of right chest tube. Persistent complex small stable volume right-sided pleural fluid. Suggestion of some debris within the fluid at the right chest base. Bubbles of pneumothorax gas on the right are smaller in size. 2.  Moderate left pleural fluid is slightly larger. 3.  Increased hazy opacities at the left upper lobe. Bilateral smooth interstitial thickening. These findings suggest a degree of pulmonary edema. 4.  Aspirated material leading to the right lower lobe again seen, but the aspirated material appears smaller since 08/20/2024. Moderate bibasilar atelectasis. 5.  A few additional stable findings.     XR Chest Port 1 View    Result Date: 8/30/2024  EXAM: XR CHEST PORT 1 VIEW LOCATION: [facility] DATE: 8/30/2024 INDICATION: Worsening hypoxia. COMPARISON: 6/29/2024.     IMPRESSION: Prior increased left basilar opacification has mildly improved. Otherwise, no significant change of bilateral mid to lower lung predominant opacities / atelectasis and small pleural effusions. Interstitial prominence has increased, suggesting pulmonary edema. Stable cardiomediastinal silhouette. Pacemaker. TAVR. Right PICC tip over the distal SVC. No pneumothorax.     XR Chest Port 1 View    Result Date: 8/29/2024  EXAM: XR CHEST PORT 1 VIEW LOCATION: [facility]  [facility] DATE: 8/29/2024 INDICATION: Hypoxia, cough, eval for intervel change COMPARISON: 8/27/2024 and multiple prior studies, CT chest 8/20/2024     IMPRESSION: Left costophrenic angle is clipped. Prior TAVR and dual lead left subclavian venous pacer. Right upper extremity PICC line catheter is in good position. Increased opacification in the left lower lobe of concern for worsening pneumonia. Volume loss on the right with small effusion and moderate right infrahilar opacities are unchanged. No visible pneumothorax.    XR Chest Port 1 View    Result Date: 8/27/2024  EXAM: XR CHEST PORT 1 VIEW LOCATION: [facility] DATE: 8/27/2024 INDICATION: Recheck Chest tube, s p VATS 8 19, right lung re expansion with pulmonary toileting COMPARISON: 8/26/2024     IMPRESSION: Very minimal increase in aeration in the right lung. There still remains significant atelectasis right lung base with pleural fluid or thickening. Minimal pleural fluid or thickening left lung base with atelectasis. Cardiac enlargement. Pulmonary vascular congestion. Aortic calcification. AVR. Right PICC with tip in the low SVC. Left-sided cardiac pacemaker.    XR Chest Port 1 View    Result Date: 8/26/2024  EXAM: XR CHEST PORT 1 VIEW LOCATION: [facility] DATE: 8/26/2024 INDICATION: Recheck Chest tube, s p VATS 8 19, right lung re expansion with pulmonary toileting COMPARISON: 8/25/2024.     IMPRESSION: The heart is unchanged in appearance. The right PICC and dual-lead pacing device as well as the cardiac valvular prosthesis are all unchanged. The lungs are otherwise unchanged appearance    XR Chest Port 1 View    Result Date: 8/25/2024  EXAM: XR CHEST PORT 1 VIEW LOCATION: [facility] DATE: 8/25/2024 INDICATION: s p chest tube removal. COMPARISON: 8/5/2024 at 0616 hours     IMPRESSION: Prior seen right chest tube has been removed with no evidence for a pneumothorax. Otherwise the  chest is stable.    XR Chest Port 1 View    Result Date: 8/25/2024  EXAM: XR CHEST PORTABLE 1 VIEW LOCATION: [facility] DATE: 08/25/2024 INDICATION: Recheck chest tube, status post VATS 08/19, right lung re-expansion with pulmonary toileting. COMPARISON: Yesterday.     IMPRESSION: Right PICC line tip in the SVC. Right chest tube with tip projecting over the right upper lung. No pneumothorax. Endovascular aortic valve replacement. Left infraclavicular pacemaker with lead tips projecting over the right atrium and right ventricle. Heart size upper limits of normal. Mild to moderate pulmonary vascular congestion with bilateral pleural effusions and associated compressive atelectasis. Degenerative changes bilateral shoulders, greater on the right.     XR Chest Port 1 View    Result Date: 8/24/2024  EXAM: XR CHEST PORT 1 VIEW LOCATION: [facility] DATE: 8/24/2024 INDICATION: Recheck Chest tube, s p VATS 8 19, right lung re expansion with pulmonary toileting COMPARISON: 8/23/2024.     IMPRESSION: Right apical chest tube remains in position. Right PICC tip in the mid SVC. Post endovascular repair of the aortic valve. Left subclavian pacemaker unchanged. Improved aeration of the right lung. No pneumothorax visible. Small bilateral pleural effusions and atelectasis/infiltrate of both lower lungs. Interstitial prominence consistent with mild edema. Stable cardiomegaly.    XR Chest Port 1 View    Result Date: 8/23/2024  EXAM: XR CHEST PORT 1 VIEW LOCATION: [facility] DATE: 8/23/2024 INDICATION: Recheck chest tube and right lung s p VATS and placement of water seal done on 8 22 at 1220 COMPARISON: Yesterday     IMPRESSION: Postthoracotomy changes on the right with large bore chest tube. Right upper extremity PICC line catheter in good position. Dual-lead left subclavian venous pacer. TAVR.  Shallower inspiration., Likely little change in the small,  loculated hydropneumothorax in the right. Small right apical cap appears unchanged. Stable left pleural effusion with infiltrate or atelectasis in the left base.     XR Chest Port 1 View    Result Date: 8/22/2024  EXAM: XR CHEST PORT 1 VIEW LOCATION: [facility] DATE: 8/22/2024 INDICATION: Recheck chest tube and pleural effusions. Postop thoracotomy and empyema drainage. COMPARISON: CT chest and chest x-ray 8/20/2024 and older studies     IMPRESSION: Postthoracotomy changes on the right with large bore chest tube. Right upper extremity PICC line catheter in good position. Dual-lead left subclavian venous pacer. TAVR. Shallower inspiration., Likely little change in the small, loculated hydropneumothorax in the right. Small right apical cap appears slightly decreased from before. Increased atelectasis in the left base.    CT Chest w/o Contrast    Result Date: 8/20/2024  EXAM: CT CHEST W/O CONTRAST LOCATION: [facility] DATE: 8/20/2024 INDICATION: ?LLL mass or occlusion, unable to re expand lung. COMPARISON: 8/16/2024 TECHNIQUE: CT chest without IV contrast. Multiplanar reformats were obtained. Dose reduction techniques were used. Lack of intravenous contrast significantly limits exam. CONTRAST: None. FINDINGS: LUNGS AND PLEURA: Interval placement of large bore right-sided chest tube with decrease in right pleural effusion. There is a moderate loculated appearing residual. Small anterior pneumothorax now noted. There is improved aeration of the right upper lobe, with persistent right middle lobe and lower lobe collapse and mediastinal shift. Centrilobular emphysematous change with an upper lobe predominance. Increasing, now moderate left pleural effusion with compressive basilar atelectasis. Moderate retained secretions in the central airways. Significant retained secretions in right lower lobe bronchi. MEDIASTINUM/AXILLAE: Right-sided PICC line terminates in the distal  SVC. Cardiac pacemaker. TAVR. CORONARY ARTERY CALCIFICATION: Severe. UPPER ABDOMEN: Right adrenal nodular hyperplasia. Significant enlargement of the left adrenal gland, potentially containing multiple low-attenuation nodules measuring up to 2 cm in size. Adenomatous hyperplasia favored. MUSCULOSKELETAL: No acute bony abnormalities.     IMPRESSION: 1.  Decreased right pleural effusion status post chest tube placement, with moderate, likely loculated/complicated residual, as well as small associated right pneumothorax now evident. 2.  Reexpansion of the right upper lobe with persistent collapse of right middle and lower lobe segments. Significant retained secretions, greatest in the right lower lobe. 3.  Increasing left pleural effusion. 4.  Additional findings as above.     XR Chest Port 1 View    Result Date: 8/20/2024  EXAM: XR CHEST PORT 1 VIEW LOCATION: [facility] DATE: 8/20/2024 INDICATION: Recheck chest tube and pleural effusions. Postop thoracotomy and empyema drainage. COMPARISON: 8/19/2024 and older studies, CT chest 8/16/2024 and older studies     IMPRESSION: Postthoracotomy changes with large bore right-sided chest tube. Removal of the right basilar pigtail chest tube. Dual-lead left subclavian venous pacer, TAVR and right upper extremity PICC line catheter remain in good position. There has been only partial reexpansion of the right upper lobe and apparently non of the right lower lobe. Likely small pneumothorax outlining collapsed lung in the right lateral costophrenic angle. Small right apical pleural cap persists. Volume loss with marked shift of the mediastinum into the right chest has only slightly decreased. Minimal atelectasis in the left base behind the heart with trace effusion again noted. No signs of pneumonia or failure.    XR Chest Port 1 View    Result Date: 8/19/2024  EXAM: XR CHEST PORT 1 VIEW LOCATION: [facility] DATE: 8/19/2024  INDICATION: Recheck chest tube and pleural effusions COMPARISON: August 18, 2024     IMPRESSION: Stable pacemaker, TAVR, right chest tube, right PICC. Minimal change in near complete opacification of the right hemithorax with volume loss.    XR Chest Port 1 View    Result Date: 8/18/2024  EXAM: XR CHEST PORT 1 VIEW LOCATION: [facility] DATE: 8/18/2024 INDICATION: Recheck chest tube and pleural effusions COMPARISON: 8/17/2024     IMPRESSION: No change since yesterday. Small caliber chest tube projected at the right lung base unchanged. Complete opacification of the right hemithorax with volume loss unchanged. Right PICC line tip in low SVC. Transvenous pacemaker. Hyperinflation left lung.    POC US Chest B-Scan    Limited Bedside Lung Ultrasound, performed and interpreted by me. Indication: empyema and dyspnea With the patient positioned supine, right posterior and lateral lung fields were examined for evidence of thoracic free fluid, pulmonary consolidation, and pulmonary edema. Findings: moderate right pleural effusion noted, mostly free flowing. Some debris noted (plankton sign) Chest tube visualized in the pleural effusion. Right lung atelectasis noted. No obvious loculations although scanning was limited due to presence of dressing. IMPRESSION: moderate to large right pleural effusion with chest tube in place. No obvious loculations noted on this limited bedside pleural/lung POCUS.      XR Chest Port 1 View    Result Date: 8/17/2024  EXAM: XR CHEST PORT 1 VIEW LOCATION: [facility] DATE: 8/17/2024 INDICATION: Recheck chest tube and pleural effusions COMPARISON: CT chest without contrast 08/16/2024, chest radiograph 08/15/2024     IMPRESSION: Stable position of the right basilar pigtail chest tube. Stable complete opacification of the right hemithorax with rib crowding compatible with large pleural effusion and associated collapse of the right lung. Right upper  extremity PICC line  with tip overlying the mid aspect of the SVC. Cardiomediastinal silhouette is partially obscured by the above process however appears grossly stable. Aortic valve replacement. Stable position of the left chest pacemaker. Trace left pleural effusion. Streaky opacities at the left lung base favoring atelectasis. No pneumothorax. Unchanged osseous structures.    CT Chest w/o Contrast    Result Date: 8/16/2024  EXAM: CT CHEST W/O CONTRAST LOCATION: [facility] DATE: 8/16/2024 INDICATION: Recheck chest tube placement, clogged, etc. and recheck effusion given patient increase O2 and tachycardia COMPARISON: 8/13/2024 TECHNIQUE: CT chest without IV contrast. Multiplanar reformats were obtained. Dose reduction techniques were used. CONTRAST: None. FINDINGS: LUNGS AND PLEURA: Large right pleural effusion increased from previous. Complete collapse of the right lung. The right mainstem, upper and lower lobe bronchi are essentially completely occluded increased from previous. Single right chest tube. Small left pleural effusion with passive atelectasis left lung base slightly increased from previous. MEDIASTINUM/AXILLAE: Transvenous pacemaker. Aortic valve prosthesis. Right PICC line tip in SVC. CORONARY ARTERY CALCIFICATION: Severe. UPPER ABDOMEN: No significant finding. MUSCULOSKELETAL: Unremarkable.     IMPRESSION: 1.  Large right pleural effusion increased from previous despite the presence of the chest tube. 2.  Complete collapse of the right lung with complete opacification of the right upper lobe, right lower lobe and right mainstem bronchus increased from previous. 3.  Small left pleural effusion increased from previous.     XR Chest 1 View    Result Date: 8/15/2024  EXAM: XR CHEST 1 VIEW LOCATION: [facility] DATE: 8/15/2024 INDICATION: Treated for empyema, s p chest tube , follow up pleural effusion COMPARISON: CT chest 8/13/2024, chest radiograph  8/11/2024     IMPRESSION: Unchanged position of the pigtail chest tube in the right lung base with similar complete opacification of the right hemithorax. Trace left effusion with dependent atelectasis. Background emphysema. Cardiac silhouette and mediastinal contours  are mostly obscured. Aortic valve replacement. Left chest cardiac generator and leads are unchanged. Right upper extremity PICC tip terminates in the mid SVC.    XR Video Swallow with SLP or OT    Result Date: 8/14/2024  EXAM: XR VIDEO SWALLOW WITH SLP OR OT LOCATION: [facility] DATE: 8/14/2024 INDICATION: Difficulty swallowing. COMPARISON: None. TECHNIQUE: Routine swallow study with speech pathology using multiple barium thicknesses. RADIATION DOSE: Dose Area Product 23.86 microGy-m2 FINDINGS: Swallow study with Speech Pathology using multiple barium thicknesses. Penetration with thin liquids, which slightly improved with chin tuck maneuver and decreased swallow volumes. No definite aspiration visualized with trialed consistencies.     IMPRESSION: Penetration with thin liquids, however no aspiration visualized. Please see speech pathology report for further details and recommendations.    CT Chest w/o Contrast    Result Date: 8/14/2024  EXAM: CT CHEST W/O CONTRAST LOCATION: [facility] DATE: 8/13/2024 INDICATION: empyema s p chest tube, ?evacuated COMPARISON: 8/10/2024 TECHNIQUE: CT chest without IV contrast. Multiplanar reformats were obtained. Dose reduction techniques were used. CONTRAST: None. FINDINGS: LUNGS AND PLEURA: Right basilar pigtail pleural drain in place. Large right pleural effusion with complete atelectasis of the right lung. A few foci of gas in the pleural space. Small left pleural effusion. Emphysema. Mild frothy secretions in trachea. Debris occludes the central right lung airways. MEDIASTINUM/AXILLAE: Stable left subclavian approach pacemaker and TAVR. Right PICC with tip near  the cavoatrial junction. Rightward mediastinal shift. No lymphadenopathy. CORONARY ARTERY CALCIFICATION: Moderate. UPPER ABDOMEN: Unchanged thickening of the left adrenal gland. MUSCULOSKELETAL: Unremarkable     IMPRESSION: 1.  Large right pleural effusion with right lung collapse.     US Abdomen Limited    Result Date: 8/12/2024  EXAM: US ABDOMEN LIMITED LOCATION: [facility] DATE: 8/12/2024 INDICATION: Abnormal LFTs. COMPARISON: CT 8/10/2024 TECHNIQUE: Limited abdominal ultrasound. FINDINGS: Study is limited by a chest tube and a compression dressing on the right side. Within limitations, findings are as follows. GALLBLADDER: Limited evaluation is within normal limits. No gallstones, wall thickening, or pericholecystic fluid. Negative sonographic Ospina's sign. BILE DUCTS: No biliary dilatation. The common duct measures 7 mm. LIVER: Normal parenchyma with smooth contour. No focal mass. RIGHT KIDNEY: Not seen due to bowel gas. PANCREAS: The pancreas is largely obscured by overlying gas. No ascites.     IMPRESSION: 1.  Normal limited abdominal ultrasound within limitations detailed above.     XR Chest 1 View    Result Date: 8/11/2024  EXAM: XR CHEST 1 VIEW LOCATION: [facility] DATE: 08/11/2024 INDICATION: New chest tube placement. COMPARISON: 08/10/2024 CXR and CT chest.     IMPRESSION: Interval placement right basilar pleural drainage catheter. Previously seen large right pleural effusion has been nearly completely evacuated and the mid and lower right lung have partially reexpanded. No pneumothorax. Left lung clear. Mild cardiac enlargement. Transcatheter aortic valve replacement. Pacer anterior left chest wall with lead tips in the RA and RV. Right PICC tip RA/SVC junction.     XR Chest Port 1 View    Result Date: 8/10/2024  EXAM: XR CHEST PORT 1 VIEW LOCATION: [facility] DATE: 8/10/2024 INDICATION: RN placed PICC   verify tip placement  COMPARISON: 8/10/2024     IMPRESSION: New right PICC with tip near the cavoatrial junction. Otherwise, no significant interval change.    XR Chest Port 1 View    Result Date: 8/10/2024  EXAM: XR CHEST PORTABLE 1 VIEW LOCATION: [facility] DATE: 08/10/2024 INDICATION: Status post right pigtail chest tube. COMPARISON: 08/10/2024 at 1010 hours.     IMPRESSION: No change in dual-lead cardiac pacer or TAVR. Right-sided chest tube has been placed since comparison study with decrease in the previously seen large right pleural effusion. A moderate to large pleural effusion remains. There is likely overlying compressive atelectasis of the inferior portion of the right lung with concurrent infectious process not excluded. There is some indistinctness of the pulmonary interstitium involving the left lower lobe which is new from prior study and may reflect airway inflammation or edema.     CT Chest w Contrast    Result Date: 8/10/2024  EXAM: CT CHEST W CONTRAST LOCATION: [facility] DATE: 8/10/2024 INDICATION: SOB, large right-sided pleural effusion status post right pigtail COMPARISON: None. TECHNIQUE: CT chest with IV contrast. Multiplanar reformats were obtained. Dose reduction techniques were used. CONTRAST: 90 mL Isovue-370 FINDINGS: LUNGS AND PLEURA: Large right effusion. Pigtail catheter is at the anterior right apex without significant fluid surrounding it. Extensive compressive atelectasis throughout the right lung. Left lung clear. MEDIASTINUM/AXILLAE: No lymphadenopathy. No thoracic aneurysm. CORONARY ARTERY CALCIFICATION: Moderate. UPPER ABDOMEN: No acute findings. MUSCULOSKELETAL: No frankly destructive bony lesions.     IMPRESSION: 1.  Large right effusion and extensive compressive atelectasis versus less likely infiltrate in the right lung. 2.  The pigtail catheter tip is at the anterior apex, most of the fluid is at the base.        XR Chest Port 1 View    Result  Date: 8/10/2024  EXAM: XR CHEST PORT 1 VIEW LOCATION: [facility] DATE: 8/10/2024 INDICATION: sob, hypoxic, history of prior ptx, eval for ptx, pna or edema COMPARISON: 7/17/2020     IMPRESSION: Large right pleural effusion. No pneumothorax. Left subclavian approach pacing device. Prosthetic aortic valve. Cardiac silhouette within normal limits. No acute osseous abnormality.

## 2024-09-05 NOTE — PLAN OF CARE
Problem: Adult Inpatient Plan of Care  Goal: Plan of Care Review  Description: The Plan of Care Review/Shift note should be completed every shift.  The Outcome Evaluation is a brief statement about your assessment that the patient is improving, declining, or no change.  This information will be displayed automatically on your shift  note.  Outcome: Progressing     Problem: Adult Inpatient Plan of Care  Goal: Optimal Comfort and Wellbeing  Outcome: Progressing     Problem: Risk for Delirium  Goal: Improved Sleep  Outcome: Progressing     Goal Outcome Evaluation:       Pt a&o x4. No complaints of pain or SOB. Desatted overnight, on 6 LPM oxygen mask. Converted to sinus rhythm, contacted Dr. Whitman for EKG for confirmation. Repositioned overnight as patient allowed. Good urine output via paiz. Continues to have lasix and dobutamine drips.
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
## 2024-09-06 ENCOUNTER — APPOINTMENT (OUTPATIENT)
Dept: PHYSICAL THERAPY | Facility: HOSPITAL | Age: 74
DRG: 166 | End: 2024-09-06
Attending: FAMILY MEDICINE
Payer: COMMERCIAL

## 2024-09-06 ENCOUNTER — APPOINTMENT (OUTPATIENT)
Dept: OCCUPATIONAL THERAPY | Facility: HOSPITAL | Age: 74
DRG: 166 | End: 2024-09-06
Attending: FAMILY MEDICINE
Payer: COMMERCIAL

## 2024-09-06 ENCOUNTER — ANCILLARY PROCEDURE (OUTPATIENT)
Dept: CARDIOLOGY | Facility: CLINIC | Age: 74
End: 2024-09-06
Attending: INTERNAL MEDICINE
Payer: COMMERCIAL

## 2024-09-06 DIAGNOSIS — Z95.0 CARDIAC PACEMAKER IN SITU: ICD-10-CM

## 2024-09-06 DIAGNOSIS — I49.5 SINUS NODE DYSFUNCTION (H): ICD-10-CM

## 2024-09-06 DIAGNOSIS — I49.5 SICK SINUS SYNDROME (H): ICD-10-CM

## 2024-09-06 DIAGNOSIS — I48.0 PAROXYSMAL ATRIAL FIBRILLATION (H): ICD-10-CM

## 2024-09-06 LAB
ANION GAP SERPL CALCULATED.3IONS-SCNC: 10 MMOL/L (ref 7–15)
BUN SERPL-MCNC: 23 MG/DL (ref 8–23)
CALCIUM SERPL-MCNC: 9.5 MG/DL (ref 8.8–10.4)
CHLORIDE SERPL-SCNC: 84 MMOL/L (ref 98–107)
CREAT SERPL-MCNC: 0.82 MG/DL (ref 0.51–0.95)
EGFRCR SERPLBLD CKD-EPI 2021: 75 ML/MIN/1.73M2
GLUCOSE BLDC GLUCOMTR-MCNC: 110 MG/DL (ref 70–99)
GLUCOSE BLDC GLUCOMTR-MCNC: 111 MG/DL (ref 70–99)
GLUCOSE BLDC GLUCOMTR-MCNC: 118 MG/DL (ref 70–99)
GLUCOSE BLDC GLUCOMTR-MCNC: 127 MG/DL (ref 70–99)
GLUCOSE BLDC GLUCOMTR-MCNC: 137 MG/DL (ref 70–99)
GLUCOSE SERPL-MCNC: 109 MG/DL (ref 70–99)
HCO3 SERPL-SCNC: 43 MMOL/L (ref 22–29)
MAGNESIUM SERPL-MCNC: 2.2 MG/DL (ref 1.7–2.3)
POTASSIUM SERPL-SCNC: 3.8 MMOL/L (ref 3.4–5.3)
SODIUM SERPL-SCNC: 137 MMOL/L (ref 135–145)

## 2024-09-06 PROCEDURE — 250N000013 HC RX MED GY IP 250 OP 250 PS 637: Performed by: SPECIALIST

## 2024-09-06 PROCEDURE — 210N000001 HC R&B IMCU HEART CARE

## 2024-09-06 PROCEDURE — 250N000011 HC RX IP 250 OP 636: Performed by: INTERNAL MEDICINE

## 2024-09-06 PROCEDURE — 250N000009 HC RX 250: Performed by: INTERNAL MEDICINE

## 2024-09-06 PROCEDURE — 250N000013 HC RX MED GY IP 250 OP 250 PS 637: Performed by: STUDENT IN AN ORGANIZED HEALTH CARE EDUCATION/TRAINING PROGRAM

## 2024-09-06 PROCEDURE — 97110 THERAPEUTIC EXERCISES: CPT | Mod: GP

## 2024-09-06 PROCEDURE — 82310 ASSAY OF CALCIUM: CPT | Performed by: SPECIALIST

## 2024-09-06 PROCEDURE — 83735 ASSAY OF MAGNESIUM: CPT | Performed by: HOSPITALIST

## 2024-09-06 PROCEDURE — 99291 CRITICAL CARE FIRST HOUR: CPT | Performed by: INTERNAL MEDICINE

## 2024-09-06 PROCEDURE — 80048 BASIC METABOLIC PNL TOTAL CA: CPT | Performed by: SPECIALIST

## 2024-09-06 PROCEDURE — 250N000013 HC RX MED GY IP 250 OP 250 PS 637: Performed by: FAMILY MEDICINE

## 2024-09-06 PROCEDURE — 94640 AIRWAY INHALATION TREATMENT: CPT | Mod: 76

## 2024-09-06 PROCEDURE — 99233 SBSQ HOSP IP/OBS HIGH 50: CPT | Performed by: HOSPITALIST

## 2024-09-06 PROCEDURE — 94640 AIRWAY INHALATION TREATMENT: CPT

## 2024-09-06 PROCEDURE — 999N000157 HC STATISTIC RCP TIME EA 10 MIN

## 2024-09-06 PROCEDURE — 250N000009 HC RX 250

## 2024-09-06 PROCEDURE — 99233 SBSQ HOSP IP/OBS HIGH 50: CPT | Performed by: FAMILY MEDICINE

## 2024-09-06 PROCEDURE — 97530 THERAPEUTIC ACTIVITIES: CPT | Mod: GO

## 2024-09-06 PROCEDURE — 250N000013 HC RX MED GY IP 250 OP 250 PS 637: Performed by: INTERNAL MEDICINE

## 2024-09-06 PROCEDURE — 250N000013 HC RX MED GY IP 250 OP 250 PS 637: Performed by: HOSPITALIST

## 2024-09-06 PROCEDURE — 258N000003 HC RX IP 258 OP 636: Performed by: INTERNAL MEDICINE

## 2024-09-06 RX ORDER — ACETAZOLAMIDE 500 MG/5ML
500 INJECTION, POWDER, LYOPHILIZED, FOR SOLUTION INTRAVENOUS EVERY 12 HOURS
Status: COMPLETED | OUTPATIENT
Start: 2024-09-06 | End: 2024-09-07

## 2024-09-06 RX ORDER — METOLAZONE 2.5 MG/1
2.5 TABLET ORAL ONCE
Status: COMPLETED | OUTPATIENT
Start: 2024-09-06 | End: 2024-09-06

## 2024-09-06 RX ADMIN — ACETAZOLAMIDE 500 MG: 500 INJECTION, POWDER, LYOPHILIZED, FOR SOLUTION INTRAVENOUS at 06:36

## 2024-09-06 RX ADMIN — PANTOPRAZOLE SODIUM 40 MG: 40 TABLET, DELAYED RELEASE ORAL at 06:37

## 2024-09-06 RX ADMIN — METOLAZONE 2.5 MG: 2.5 TABLET ORAL at 06:37

## 2024-09-06 RX ADMIN — GABAPENTIN 300 MG: 300 CAPSULE ORAL at 21:11

## 2024-09-06 RX ADMIN — ACETAZOLAMIDE 500 MG: 500 INJECTION, POWDER, LYOPHILIZED, FOR SOLUTION INTRAVENOUS at 18:23

## 2024-09-06 RX ADMIN — ACETAMINOPHEN 650 MG: 325 TABLET, FILM COATED ORAL at 18:45

## 2024-09-06 RX ADMIN — FUROSEMIDE 15 MG/HR: 10 INJECTION, SOLUTION INTRAVENOUS at 20:03

## 2024-09-06 RX ADMIN — LEVALBUTEROL HYDROCHLORIDE 1.25 MG: 1.25 SOLUTION RESPIRATORY (INHALATION) at 20:52

## 2024-09-06 RX ADMIN — SUCRALFATE 1 G: 1 TABLET ORAL at 06:37

## 2024-09-06 RX ADMIN — DIGOXIN 125 MCG: 125 TABLET ORAL at 09:36

## 2024-09-06 RX ADMIN — ATORVASTATIN CALCIUM 20 MG: 10 TABLET, FILM COATED ORAL at 21:11

## 2024-09-06 RX ADMIN — APIXABAN 5 MG: 5 TABLET, FILM COATED ORAL at 21:11

## 2024-09-06 RX ADMIN — GABAPENTIN 300 MG: 300 CAPSULE ORAL at 14:06

## 2024-09-06 RX ADMIN — POLYETHYLENE GLYCOL 3350 17 G: 17 POWDER, FOR SOLUTION ORAL at 09:37

## 2024-09-06 RX ADMIN — SODIUM CHLORIDE SOLN NEBU 3% 3 ML: 3 NEBU SOLN at 20:54

## 2024-09-06 RX ADMIN — APIXABAN 5 MG: 5 TABLET, FILM COATED ORAL at 09:35

## 2024-09-06 RX ADMIN — IPRATROPIUM BROMIDE 0.5 MG: 0.5 SOLUTION RESPIRATORY (INHALATION) at 11:06

## 2024-09-06 RX ADMIN — LIDOCAINE 1 PATCH: 4 PATCH TOPICAL at 21:08

## 2024-09-06 RX ADMIN — FUROSEMIDE 15 MG/HR: 10 INJECTION, SOLUTION INTRAVENOUS at 03:44

## 2024-09-06 RX ADMIN — ACETYLCYSTEINE 2 ML: 200 SOLUTION ORAL; RESPIRATORY (INHALATION) at 20:53

## 2024-09-06 RX ADMIN — Medication 1 SPRAY: at 10:45

## 2024-09-06 RX ADMIN — SUCRALFATE 1 G: 1 TABLET ORAL at 12:43

## 2024-09-06 RX ADMIN — LEVALBUTEROL HYDROCHLORIDE 1.25 MG: 1.25 SOLUTION RESPIRATORY (INHALATION) at 11:06

## 2024-09-06 RX ADMIN — GABAPENTIN 300 MG: 300 CAPSULE ORAL at 09:37

## 2024-09-06 RX ADMIN — FUROSEMIDE 15 MG/HR: 10 INJECTION, SOLUTION INTRAVENOUS at 11:04

## 2024-09-06 RX ADMIN — FLUTICASONE FUROATE AND VILANTEROL TRIFENATATE 1 PUFF: 200; 25 POWDER RESPIRATORY (INHALATION) at 09:40

## 2024-09-06 RX ADMIN — SENNOSIDES AND DOCUSATE SODIUM 1 TABLET: 8.6; 5 TABLET ORAL at 09:36

## 2024-09-06 RX ADMIN — Medication 1 SPRAY: at 17:12

## 2024-09-06 RX ADMIN — IPRATROPIUM BROMIDE 0.5 MG: 0.5 SOLUTION RESPIRATORY (INHALATION) at 20:52

## 2024-09-06 ASSESSMENT — ACTIVITIES OF DAILY LIVING (ADL)
ADLS_ACUITY_SCORE: 47
ADLS_ACUITY_SCORE: 51
ADLS_ACUITY_SCORE: 47
ADLS_ACUITY_SCORE: 47
ADLS_ACUITY_SCORE: 51
ADLS_ACUITY_SCORE: 47
ADLS_ACUITY_SCORE: 51
ADLS_ACUITY_SCORE: 47
ADLS_ACUITY_SCORE: 47
ADLS_ACUITY_SCORE: 51
ADLS_ACUITY_SCORE: 51
ADLS_ACUITY_SCORE: 47
ADLS_ACUITY_SCORE: 47
ADLS_ACUITY_SCORE: 51
ADLS_ACUITY_SCORE: 47

## 2024-09-06 NOTE — PLAN OF CARE
Problem: Skin Injury Risk Increased  Goal: Skin Health and Integrity  Outcome: Progressing  Intervention: Plan: Nurse Driven Intervention: Moisture Management  Recent Flowsheet Documentation  Taken 9/6/2024 1043 by Sharlene Collins RN  Bathing/Skin Care: wipes, CHG  Taken 9/6/2024 0801 by Sharlene Collins, RN  Moisture Interventions: Urinary collection device  Plan: Moisture Management: incontinence pad  Intervention: Plan: Nurse Driven Intervention: Friction and Shear  Recent Flowsheet Documentation  Taken 9/6/2024 0801 by Sharlene Collins, RN  Friction/Shear Interventions:   HOB 30 degrees or less   Silicone foam sacral dressing   Pad bony prominence (elbow pads, heel pads, ear protectors)  Intervention: Optimize Skin Protection  Recent Flowsheet Documentation  Taken 9/6/2024 0801 by Sharlene Collins, RN  Activity Management: activity adjusted per tolerance     Problem: Mobility Impairment  Goal: Optimal Mobility  Outcome: Not Progressing  Intervention: Optimize Mobility  Recent Flowsheet Documentation  Taken 9/6/2024 0801 by Sharlene Collins, RN  Activity Management: activity adjusted per tolerance   Goal Outcome Evaluation:  Turns every 2 hours. Not able to ambulate due to generalized weakness, dizziness. Poor oral intake.  Lasix and dobutamine gtt continued.

## 2024-09-06 NOTE — PROVIDER NOTIFICATION
Patient needing increase in 02 this evening. 02 sats 83-85% on 2 L NC. Patient sleeping arouses but very drowsy. Patient is comfort care and was made DNR/DNI today. Put on 5L oxymask  sats 93-95%

## 2024-09-06 NOTE — PROGRESS NOTES
RESPIRATORY CARE NOTE     Patient declined morning nebulizer treatments, reviewed with bedside RN. Will continued at next treatment.    Patient currently sleeping, respiratory rate and depth normal.     At 11 she did her xopenex and ipratropium bromide but declined the 3% and mucomyst. MD notified. RN aware. Remains on 2L NC, remains sleepy with normal respiratory rate.       Sanam Mmebreno, RT

## 2024-09-06 NOTE — PLAN OF CARE
Problem: Adult Inpatient Plan of Care  Goal: Absence of Hospital-Acquired Illness or Injury  Outcome: Progressing  Intervention: Identify and Manage Fall Risk  Recent Flowsheet Documentation  Taken 9/5/2024 1610 by Talia Talley RN  Safety Promotion/Fall Prevention:   activity supervised   clutter free environment maintained   patient and family education   room door open   room organization consistent   safety round/check completed   toileting scheduled  Intervention: Prevent Skin Injury  Recent Flowsheet Documentation  Taken 9/5/2024 2130 by Talia Talley RN  Body Position:   turned   left  Taken 9/5/2024 1840 by Talia Talley RN  Body Position:   right   turned  Taken 9/5/2024 1715 by Talia Talley RN  Body Position: supine  Taken 9/5/2024 1610 by Talia Talley RN  Body Position:   left   turned  Skin Protection: adhesive use limited  Device Skin Pressure Protection: absorbent pad utilized/changed  Taken 9/5/2024 1515 by Talia Talley RN  Body Position:   right   turned  Intervention: Prevent and Manage VTE (Venous Thromboembolism) Risk  Recent Flowsheet Documentation  Taken 9/5/2024 2030 by Talia Talley RN  VTE Prevention/Management: SCDs on (sequential compression devices)  Taken 9/5/2024 1610 by Talia Talley RN  VTE Prevention/Management: SCDs off (sequential compression devices)  Goal: Optimal Comfort and Wellbeing  Outcome: Progressing  Intervention: Provide Person-Centered Care  Recent Flowsheet Documentation  Taken 9/5/2024 2030 by Talia Talley RN  Trust Relationship/Rapport:   choices provided   emotional support provided   questions encouraged  Taken 9/5/2024 1610 by Talia Talley RN  Trust Relationship/Rapport:   choices provided   emotional support provided   questions encouraged  Goal: Readiness for Transition of Care  Outcome: Progressing     Problem: Gas Exchange Impaired  Goal: Optimal Gas Exchange  Outcome: Progressing  Intervention: Optimize Oxygenation and  Ventilation  Recent Flowsheet Documentation  Taken 9/5/2024 1840 by Talia Talley RN  Head of Bed (HOB) Positioning: HOB at 20-30 degrees  Taken 9/5/2024 1610 by Talia Talley RN  Head of Bed (HOB) Positioning: HOB at 20 degrees  Taken 9/5/2024 1515 by Talia Talley RN  Head of Bed (HOB) Positioning: HOB at 20-30 degrees     Problem: Dysrhythmia  Goal: Normalized Cardiac Rhythm  Outcome: Progressing  Intervention: Monitor and Manage Cardiac Rhythm Effect  Recent Flowsheet Documentation  Taken 9/5/2024 2030 by Talia Talley RN  VTE Prevention/Management: SCDs on (sequential compression devices)  Taken 9/5/2024 1610 by Talia Talley RN  VTE Prevention/Management: SCDs off (sequential compression devices)     Problem: Comorbidity Management  Goal: Maintenance of COPD Symptom Control  Outcome: Progressing  Intervention: Maintain COPD Symptom Control  Recent Flowsheet Documentation  Taken 9/5/2024 2030 by Talia Talley RN  Supportive Measures: active listening utilized  Taken 9/5/2024 1610 by Talia Talley RN  Supportive Measures: active listening utilized  Medication Review/Management: medications reviewed  Goal: Blood Glucose Levels Within Targeted Range  Outcome: Progressing  Intervention: Monitor and Manage Glycemia  Recent Flowsheet Documentation  Taken 9/5/2024 1610 by Talia Talley RN  Medication Review/Management: medications reviewed  Goal: Blood Pressure in Desired Range  Outcome: Progressing  Intervention: Maintain Blood Pressure Management  Recent Flowsheet Documentation  Taken 9/5/2024 1610 by Talia Talley RN  Medication Review/Management: medications reviewed     Problem: Risk for Delirium  Goal: Optimal Coping  Outcome: Progressing  Intervention: Optimize Psychosocial Adjustment to Delirium  Recent Flowsheet Documentation  Taken 9/5/2024 2030 by Talia Talley RN  Supportive Measures: active listening utilized  Family/Support System Care: support provided  Taken 9/5/2024 1610 by  Talia Talley RN  Supportive Measures: active listening utilized  Family/Support System Care: support provided  Goal: Improved Behavioral Control  Intervention: Prevent and Manage Agitation  Recent Flowsheet Documentation  Taken 9/5/2024 2030 by Talia Talley RN  Environment Familiarity/Consistency: familiar objects from home provided  Taken 9/5/2024 1610 by Talia Talley RN  Environment Familiarity/Consistency: familiar objects from home provided  Intervention: Minimize Safety Risk  Recent Flowsheet Documentation  Taken 9/5/2024 2030 by Talia Talley RN  Communication Enhancement Strategies: call light answered in person  Trust Relationship/Rapport:   choices provided   emotional support provided   questions encouraged  Taken 9/5/2024 1610 by Talia Talley RN  Communication Enhancement Strategies: call light answered in person  Enhanced Safety Measures: review medications for side effects with activity  Trust Relationship/Rapport:   choices provided   emotional support provided   questions encouraged  Goal: Improved Attention and Thought Clarity  Intervention: Maximize Cognitive Function  Recent Flowsheet Documentation  Taken 9/5/2024 2030 by Talia Talley RN  Sensory Stimulation Regulation: care clustered  Reorientation Measures:   clock in view   familiar social contact encouraged  Taken 9/5/2024 1610 by Talia Talley RN  Sensory Stimulation Regulation: care clustered  Reorientation Measures:   clock in view   familiar social contact encouraged     Problem: Respiratory Compromise (Pneumothorax)  Goal: Optimal Oxygenation and Ventilation  Outcome: Progressing     Problem: Skin Injury Risk Increased  Goal: Skin Health and Integrity  Outcome: Progressing  Intervention: Plan: Nurse Driven Intervention: Moisture Management  Recent Flowsheet Documentation  Taken 9/5/2024 1610 by Talia Talley RN  Moisture Interventions: Urinary collection device  Plan: Moisture Management:   urinary collection device    incontinence pad  Intervention: Plan: Nurse Driven Intervention: Friction and Shear  Recent Flowsheet Documentation  Taken 9/5/2024 1610 by Talia Talley RN  Friction/Shear Interventions:   Silicone foam sacral dressing   HOB 30 degrees or less  Intervention: Optimize Skin Protection  Recent Flowsheet Documentation  Taken 9/5/2024 2030 by Talia Talley RN  Activity Management: bedrest  Taken 9/5/2024 1840 by Talia Talley RN  Activity Management:   activity adjusted per tolerance   bedrest   back to bed  Head of Bed (HOB) Positioning: HOB at 20-30 degrees  Taken 9/5/2024 1610 by Talia Talley RN  Skin Protection: adhesive use limited  Activity Management: bedrest  Head of Bed (HOB) Positioning: HOB at 20 degrees  Taken 9/5/2024 1515 by Talia Talley RN  Activity Management:   bedrest   activity adjusted per tolerance  Head of Bed (HOB) Positioning: HOB at 20-30 degrees     Problem: Mobility Impairment  Goal: Optimal Mobility  Outcome: Progressing  Intervention: Optimize Mobility  Recent Flowsheet Documentation  Taken 9/5/2024 2030 by Talia Talley RN  Activity Management: bedrest  Taken 9/5/2024 1840 by Talia Talley RN  Activity Management:   activity adjusted per tolerance   bedrest   back to bed  Positioning/Transfer Devices:   pillows   in use  Taken 9/5/2024 1610 by Talia Talley RN  Activity Management: bedrest  Positioning/Transfer Devices:   pillows   in use  Taken 9/5/2024 1515 by Talia Talley RN  Activity Management:   bedrest   activity adjusted per tolerance  Positioning/Transfer Devices:   pillows   in use     Problem: Fluid Volume Excess  Goal: Fluid Balance  Outcome: Progressing  Intervention: Monitor and Manage Hypervolemia  Recent Flowsheet Documentation  Taken 9/5/2024 1610 by Talia Talley RN  Skin Protection: adhesive use limited  Fluid/Electrolyte Management: fluids restricted     Problem: Pain Acute  Goal: Optimal Pain Control and Function  Outcome:  Progressing  Intervention: Prevent or Manage Pain  Recent Flowsheet Documentation  Taken 9/5/2024 2030 by Talia Talley RN  Sensory Stimulation Regulation: care clustered  Taken 9/5/2024 1610 by Talia Talley RN  Sensory Stimulation Regulation: care clustered  Medication Review/Management: medications reviewed  Intervention: Optimize Psychosocial Wellbeing  Recent Flowsheet Documentation  Taken 9/5/2024 2030 by Talia Talley RN  Supportive Measures: active listening utilized  Taken 9/5/2024 1610 by Talia Talley RN  Supportive Measures: active listening utilized     Problem: Infection  Goal: Absence of Infection Signs and Symptoms  Outcome: Progressing   Goal Outcome Evaluation:       Pt a/o x4, drowsy, lethargic. Arouses to verbal stimuli. Denies pain, except during repositioning in bed, reported her knees hurt a little. Pt's daughter in law at bedside first few hrs, then son and other family members here in [place] for a few hrs. Pt ate a few bites of sandwich at supper, and drank few sips of water, bites of applesauce w/ meds. Iv lasix and dobutamine gtts infusing as ordered. Tele remains in normal sinus. Chairez patent. 02 at 3L per n/c. Bed alarm on. Reminded pt to use call light for needs. Staff to continue to monitor pt.

## 2024-09-06 NOTE — PROGRESS NOTES
HEART CARE NOTE          Assessment/Recommendations     1. HFpEF/RV dysfunction c/b cardiogenic shock  Assessment / Plan  Diuresing well - continue furosemide gtt and acetazolamide for contraction alkalosis; continue dobutamine gtt  Echo significant for RV dysfunction. However, RVSP/PASP as well as LV function noted to be wnl - repeat echo once near euvolemia; may benefit from CT PE protocol, +/- VQ scan and possible RHC + shunt run +/- vasodilator challenge to further evaluate isolated RV dysfunction  GDMT as detailed below; mainstay of treatment for HFpEF includes diuretics and adequate BP control (class I) and SGLT2-I (class 2a); additional medical therapy (ARNI, MRA, ARB) demonstrated less robust evidence for indication but may be considered per guideline recommendations (2b); no indication for Bblockers  GDMT medical therapy on hold given the above  Patient with poor overall prognosis with no available strategies - recommend transition to inpatient Hospice cares if family would like to transition to Comfort Care measures     2.  Valvular heart disease  Assessment / Plan  S/p TAVR - adequate function on recent imaging      3. Afib/flutter  Assessment / Plan  Continue digoxin, apixaban - metoprolol held while on inotrope; EP consulted for RVR - please see detailed notes and plan of care on chart     4. CAD  Assessment / Plan  Denies chest pain or anginal equivalents  S/p coronary angiogram significant for mild non-obstructive disease  Continue atorvastatin     5. HLP  Assessment / Plan  Currently on atorvastatin     6. Acute respiratory failure  Assessment / Plan  S/p thoracentesis (transudative) c/b lung empyema - management and supportive care per primary and pulmonary teams     7. DM2  Assessment / Plan  Management per primary team  Currently on ISS    Patient remains critically ill requiring hemodynamic support via dobutamine gtt in the setting of cardiogenic shock. 50 minutes spent on critical care  "time    History of Present Illness/Subjective    Ms. Mary Kay Tejada is a 74 year old female with a PMHx significant for (per Epic notation) paroxysmal atrial flutter on apixaban, COPD, hyperlipidemia, diabetes mellitus type 2 with neuropathy, reflux, dizziness, tobacco use, fibromyalgia and chronic pain syndrome, prior pleural effusions and most recent admission in July found with E. coli pneumonia and a right sided pleural effusion which was consistent with a transudate and negative cultures who presents with dyspnea     Today, Mrs. Tejada is somnolent; unable to evaluate ROS; Management plan as detailed above     ECG: Personally reviewed. nonspecific ST and T waves changes, atrial flutter, rate controlled.     ECHO (personnaly Reviewed on 9/3/24):   Left ventricular function is normal.The ejection fraction is 55-60%.  The right ventricle is mild to moderately dilated.  Moderately decreased right ventricular systolic function  The right atrium is moderately dilated.  There is a bioprosthetic aortic valve.  There is no paravalvular regurgitation present.  The study was technically difficult.    Lab results: personally reviewed September 6, 2024; notable for contraction alkalosis - acetazolamide ordered    Medical history and pertinent documents reviewed in Care Everywhere please where applicable see details above        Physical Examination Review of Systems   /57 (BP Location: Left arm)   Pulse 90   Temp 98.9  F (37.2  C) (Oral)   Resp 20   Ht 1.651 m (5' 5\")   Wt 60.1 kg (132 lb 7.9 oz)   LMP  (LMP Unknown)   SpO2 99%   BMI 22.05 kg/m    Body mass index is 22.05 kg/m .  Wt Readings from Last 3 Encounters:   09/06/24 60.1 kg (132 lb 7.9 oz)   08/16/24 72.2 kg (159 lb 1.6 oz)   07/15/24 65.3 kg (143 lb 14.4 oz)     General Appearance:   no distress, somnolent   ENT/Mouth: membranes moist, no oral lesions or bleeding gums.      EYES:  no scleral icterus, normal conjunctivae   Neck: no carotid bruits " or thyromegaly   Chest/Lungs:   lungs are clear to auscultation, no rales or wheezing, equal chest wall expansion    Cardiovascular:   Irregular. Normal first and second heart sounds with no murmurs, rubs, or gallops; the carotid, radial and posterior tibial pulses are intact, + JVD and trace LE edema bilaterally    Abdomen:  no organomegaly, masses, bruits, or tenderness; bowel sounds are present   Extremities: no cyanosis or clubbing   Skin: no xanthelasma, warm.    Neurologic: NAD     Psychiatric: somnolent    A complete 10 systems ROS was reviewed  And is negative except what is listed in the HPI.          Medical History  Surgical History Family History Social History   Past Medical History:   Diagnosis Date    Anemia     Aortic stenosis     Aortic valve disorder     Atrial fibrillation (H)     Atrial flutter (H)     Benign neoplasm of adenomatous polyp     large intestine     Chronic constipation     Chronic heart failure with preserved ejection fraction (H) 02/29/2024    Chronic pain syndrome     Congestive heart failure (H)     COPD (chronic obstructive pulmonary disease) (H)     Oxygen at night     Dependence on supplemental oxygen     Oxygen at noc, during the day as needed    Depression     Diabetes mellitus (H)     Dry eye syndrome     Fibromyalgia     Ganglion     left wrist    GERD (gastroesophageal reflux disease)     Hyperlipidemia     Hypertension     Hypokalemia     Infective otitis externa, unspecified     Created by Conversion     Larynx edema     Lung disease     Malignant neoplasm of vulva (H)     Created by Conversion [facility] Annotation: Apr 17 2007  8:24AM - Cammy Bui:  resection per Dr. Alfonso Mnae 9/06;  Replacement Utility updated for latest IMO load    Medial epicondylitis     Onychomycosis     Osteoarthritis     Peptic ulcer     Polyneuropathy     Vulvar malignant neoplasm (H)     Past Surgical History:   Procedure Laterality Date    BIOPSY BREAST Right     BIOPSY  BREAST Right 01/28/2015    BIOPSY BREAST Right 01/28/2015    Procedure: RIGHT BREAST BIOPSY AFTER WIRE LOCALIZATION AT 0940;  Surgeon: Renée Soriano MD;  Location: [facility] OR;  Service:     BIOPSY OF BREAST, INCISIONAL      Description: Incisional Breast Biopsy;  Recorded: 11/13/2007;  Comments: benign    COLONOSCOPY N/A 06/14/2019    Procedure: COLONOSCOPY;  Surgeon: Eduardo Mora MD;  Location: [facility] OR;  Service: Gastroenterology    CV CORONARY ANGIOGRAM N/A 02/08/2024    Procedure: CV CORONARY ANGIOGRAM;  Surgeon: Moises Valencia MD;  Location: [facility]    CV LEFT HEART CATH N/A 02/08/2024    Procedure: Left Heart Catheterization;  Surgeon: Moises Valencia MD;  Location: [facility]    CV RIGHT HEART CATH MEASUREMENTS RECORDED N/A 02/08/2024    Procedure: Right Heart Catheterization;  Surgeon: Moises Valencia MD;  Location: [facility]    CV TRANSCATHETER AORTIC VALVE REPLACEMENT-FEMORAL APPROACH N/A 02/20/2024    Procedure: Transcatheter Aortic Valve Replacement, possible cardiopulmonary bypass, possible surgical intervention;  Surgeon: Moises Valencia MD;  Location: [facility] CV    EP PACEMAKER DEVICE & IMPLANT- HIS LEAD DUAL N/A 3/7/2024    Procedure: Pacemaker Device & Lead Implant - HIS Lead Dual;  Surgeon: Donnell Leon MD;  Location: [facility] CV    ESOPHAGOSCOPY, GASTROSCOPY, DUODENOSCOPY (EGD), COMBINED N/A 11/06/2018    Procedure: ESOPHAGOGASTRODUODENOSCOPY;  Surgeon: Lit Fernando MD;  Location: [facility] OR;  Service:     HYSTERECTOMY      JOINT REPLACEMENT Left     TKA    OR TRANSCATHETER AORTIC VALVE REPLACEMENT, FEMORAL PERCUTANEOUS APPROACH (STANDBY) N/A 02/20/2024    Procedure: OR TRANSCATHETER AORTIC VALVE REPLACEMENT, FEMORAL PERCUTANEOUS APPROACH (STANDBY);  Surgeon: Ishmael Farias MD;  Location: [facility] CV    PICC TRIPLE LUMEN PLACEMENT  01/12/2023         PICC TRIPLE  LUMEN PLACEMENT  8/10/2024    AL ABLATE HEART DYSRHYTHM FOCUS      Description: Catheter Ablation Atrial Fibrillation;  Recorded: 07/31/2012;  Comments: 7/24/12 PVI with Dr. Bansal to all 5 pulm veins and CTI fl ablation line as well.    TRANSCATHETER AORTIC-VALVE REPLACEMENT      VIDEO-ASSISTED THORACOSCOPIC SURGERY (VATS) Right 8/19/2024    Procedure: VIDEO-ASSISTED RIGHT THORACOSCOPIC SURGERY WITH RIGHT PLEURAL FLUID CYTOLOGY;  Surgeon: Renée Soriano MD;  Location: [facility] OR    [facility] SUPRACERV ABD HYSTERECTOMY      Description: Supracervical Hysterectomy;  Proc Date: 01/01/1985;  Comments: some cervix left!; ovaries intact; done for bleeding    no family history of premature coronary artery disease Social History     Socioeconomic History    Marital status:      Spouse name: Not on file    Number of children: Not on file    Years of education: Not on file    Highest education level: Not on file   Occupational History    Not on file   Tobacco Use    Smoking status: Some Days     Current packs/day: 0.25     Types: Cigarettes     Passive exposure: Never    Smokeless tobacco: Never    Tobacco comments:     seen by TTS inpatient on 3/31/22   Vaping Use    Vaping status: Never Used   Substance and Sexual Activity    Alcohol use: Yes     Comment: Alcoholic Drinks/day: very little    Drug use: No    Sexual activity: Not on file   Other Topics Concern    Not on file   Social History Narrative    Not on file     Social Determinants of Health     Financial Resource Strain: Low Risk  (8/21/2024)    Financial Resource Strain     Within the past 12 months, have you or your family members you live with been unable to get utilities (heat, electricity) when it was really needed?: No   Food Insecurity: Low Risk  (8/21/2024)    Food Insecurity     Within the past 12 months, did you worry that your food would run out before you got money to buy more?: No     Within the past 12 months, did the food you bought  "just not last and you didn t have money to get more?: No   Transportation Needs: Low Risk  (8/21/2024)    Transportation Needs     Within the past 12 months, has lack of transportation kept you from medical appointments, getting your medicines, non-medical meetings or appointments, work, or from getting things that you need?: No   Physical Activity: Not on file   Stress: Not on file   Social Connections: Not on file   Interpersonal Safety: Low Risk  (8/21/2024)    Interpersonal Safety     Do you feel physically and emotionally safe where you currently live?: Yes     Within the past 12 months, have you been hit, slapped, kicked or otherwise physically hurt by someone?: No     Within the past 12 months, have you been humiliated or emotionally abused in other ways by your partner or ex-partner?: No   Housing Stability: Low Risk  (8/21/2024)    Housing Stability     Do you have housing? : Yes     Are you worried about losing your housing?: No           Lab Results    Chemistry/lipid CBC Cardiac Enzymes/BNP/TSH/INR   Lab Results   Component Value Date    CHOL 167 09/20/2023    HDL 75 09/20/2023    TRIG 83 09/20/2023    BUN 23.0 09/06/2024     09/06/2024    CO2 43 (H) 09/06/2024    Lab Results   Component Value Date    WBC 1.8 (L) 09/05/2024    HGB 8.4 (L) 09/05/2024    HCT 28.7 (L) 09/05/2024    MCV 86 09/05/2024     (L) 09/05/2024    Lab Results   Component Value Date    TROPONINI 0.07 05/24/2023     (H) 06/02/2023    TSH 2.80 08/10/2024    INR 1.81 (H) 08/31/2024     Lab Results   Component Value Date    TROPONINI 0.07 05/24/2023          Weight:    Wt Readings from Last 3 Encounters:   09/06/24 60.1 kg (132 lb 7.9 oz)   08/16/24 72.2 kg (159 lb 1.6 oz)   07/15/24 65.3 kg (143 lb 14.4 oz)       Allergies  Allergies   Allergen Reactions    Celebrex [Celecoxib] Rash     patient had butterfly rash - \"lupus-like\"      Latex Rash         Surgical History  Past Surgical History:   Procedure Laterality " Date    BIOPSY BREAST Right     BIOPSY BREAST Right 01/28/2015    BIOPSY BREAST Right 01/28/2015    Procedure: RIGHT BREAST BIOPSY AFTER WIRE LOCALIZATION AT 0940;  Surgeon: Renée Soriano MD;  Location: [facility];  Service:     BIOPSY OF BREAST, INCISIONAL      Description: Incisional Breast Biopsy;  Recorded: 11/13/2007;  Comments: benign    COLONOSCOPY N/A 06/14/2019    Procedure: COLONOSCOPY;  Surgeon: Eduardo Mora MD;  Location: [facility];  Service: Gastroenterology    CV CORONARY ANGIOGRAM N/A 02/08/2024    Procedure: CV CORONARY ANGIOGRAM;  Surgeon: Moises Valencia MD;  Location: [facility] CV    CV LEFT HEART CATH N/A 02/08/2024    Procedure: Left Heart Catheterization;  Surgeon: Moises Valencia MD;  Location: [facility] CV    CV RIGHT HEART CATH MEASUREMENTS RECORDED N/A 02/08/2024    Procedure: Right Heart Catheterization;  Surgeon: Moises Valencia MD;  Location: [facility] CV    CV TRANSCATHETER AORTIC VALVE REPLACEMENT-FEMORAL APPROACH N/A 02/20/2024    Procedure: Transcatheter Aortic Valve Replacement, possible cardiopulmonary bypass, possible surgical intervention;  Surgeon: Moises Valencia MD;  Location: [facility] CV    EP PACEMAKER DEVICE & IMPLANT- HIS LEAD DUAL N/A 3/7/2024    Procedure: Pacemaker Device & Lead Implant - HIS Lead Dual;  Surgeon: Donnell Leon MD;  Location: [facility] CV    ESOPHAGOSCOPY, GASTROSCOPY, DUODENOSCOPY (EGD), COMBINED N/A 11/06/2018    Procedure: ESOPHAGOGASTRODUODENOSCOPY;  Surgeon: Lit Fernando MD;  Location: [facility];  Service:     HYSTERECTOMY      JOINT REPLACEMENT Left     TKA    OR TRANSCATHETER AORTIC VALVE REPLACEMENT, FEMORAL PERCUTANEOUS APPROACH (STANDBY) N/A 02/20/2024    Procedure: OR TRANSCATHETER AORTIC VALVE REPLACEMENT, FEMORAL PERCUTANEOUS APPROACH (STANDBY);  Surgeon: Ishmael Farias MD;  Location: [facility] CV    PICC TRIPLE LUMEN  PLACEMENT  01/12/2023         PICC TRIPLE LUMEN PLACEMENT  8/10/2024    [place] ABLATE HEART DYSRHYTHM FOCUS      Description: Catheter Ablation Atrial Fibrillation;  Recorded: 07/31/2012;  Comments: 7/24/12 PVI with Dr. Gardiner and nilay to all 5 pulm veins and CTI fl ablation line as well.    TRANSCATHETER AORTIC-VALVE REPLACEMENT      VIDEO-ASSISTED THORACOSCOPIC SURGERY (VATS) Right 8/19/2024    Procedure: VIDEO-ASSISTED RIGHT THORACOSCOPIC SURGERY WITH RIGHT PLEURAL FLUID CYTOLOGY;  Surgeon: Renée Soriano MD;  Location: [facility] OR    [facility] SUPRACERV ABD HYSTERECTOMY      Description: Supracervical Hysterectomy;  Proc Date: 01/01/1985;  Comments: some cervix left!; ovaries intact; done for bleeding       Social History  Tobacco:   History   Smoking Status    Some Days    Types: Cigarettes   Smokeless Tobacco    Never    Alcohol:   Social History    Substance and Sexual Activity      Alcohol use: Yes        Comment: Alcoholic Drinks/day: very little   Illicit Drugs:   History   Drug Use No       Family History  Family History   Problem Relation Age of Onset    Heart Failure Mother     Cancer Other         paternal HX-laryngeal     Alcoholism Sister     No Known Problems Daughter     No Known Problems Maternal Grandmother     No Known Problems Maternal Grandfather     No Known Problems Paternal Grandmother     No Known Problems Paternal Grandfather     No Known Problems Maternal Aunt     No Known Problems Paternal Aunt     Alcoholism Sister     Alcoholism Brother     Alcoholism Father     Cancer Paternal Uncle         Gastric-Alcohol    Cancer Paternal Uncle         gastric-Alcohol    Hereditary Breast and Ovarian Cancer Syndrome No family hx of     Breast Cancer No family hx of     Colon Cancer No family hx of     Endometrial Cancer No family hx of     Ovarian Cancer No family hx of           Thom Robert MD on 9/6/2024      cc: Cammy Bui

## 2024-09-06 NOTE — PROGRESS NOTES
[facility]    Medicine Progress Note - Hospitalist Service    Date of Admission:  8/17/2024    Assessment & Plan     Summary:  74-year-old female with history of a flutter, COPD, ongoing tobacco abuse, previous pleural effusions and chronic pain admitted 8/17/24 with empyema and transferred from [facility] to [facility] for surgical management.  Patient initially presented to [facility] on 8/10 with large right-sided effusion and found to have right-sided empyema with mucous plugging with strep pneumonia and E. coli pneumonia.  This does not drain well with chest tube and intrapleural lytics which is why patient was transferred to [facility] and underwent right VATS with chest tube placement on 8/19.  No decortication was done.  Patient was supposed to undergo bronchoscopy on 8/21 but was not done due to high O2 requirement.  Chest tube was removed on 8/25 subsequently.  Patient has had persistent loculated and complex appearing right effusion that has not changed.  Patient also had left-sided pleural effusion and 0.8 L was drained on 8/31. Patient is on antibiotics per ID recommendation.    Her stay here is complicated by development of new right-sided heart failure, persistent atrial flutter/fibrillation and anemia.  It has been challenging to diurese the patient given her ongoing hypotension.  Cardiology and EP are closely following.  Patient has been on intermittent diuresis and currently on Lasix drip as of 9/4.  Discussed with patient's son on 9/4 about the gradual decline of patient's clinical condition and palliative was consulted to assist with goals of care discussion. Code status changed to DNI     Empyema  Acute hypoxic respiratory failure  H/O recurrent right-sided pleural effusion/empyema status post bronchoscopy on 8/11/2024 with cultures that grew E. coli and Candida albicans.  Pleural effusion cultures growing strep pneumo  s/p VATS 8/19/24. No true emphyema or rind  found, no ability to expand lung  Repeat CT 08/20 showed right pleural effusion s/p chest tube placement, moderate likely loculated/complicated residual as well as small associated right pneumothorax, reexpansion of right upper lobe with persistent collapse of right middle and lower lobe segments.  Significant retained secretions greater in the right lower lobe and increasing left pleural effusion  Plan was for bronchoscopy 08/21, not done as patient was requiring upto 10L oxygen and had high risk of being intubated  Chest tube removed 08/25   Acute hypoxemic respiratory failure worse 8/29-8/31 and likely related to eventual findings of moderate left pleural effusion and acute right sided systolic CHF   8/29: patient with complaints of increased SOB, productive cough and weakness. CXR 8/29 shows Increased opacification in the left lower lobe of concern for worsening pneumonia. Of note patient had remained afebrile and WBC remains normal on IV ceftriaxone for pneumococcus and E coli . Yeast on BAL thought to be colonization  Discussed with ID 8/29 and they recommend no changes  Overnight 8/29-8/30 patient with worsening hypoxemic respiratory failure.   CXR 8/30 with evidence of worsening pulmonary edema and possibly improved LLL opacity.  BNP elevated but improved from previous  CT chest 8/31 showed moderate left pleural effusion and stable changes on right post surgery  S/p left sided thoracentesis 8/31 with 0.8L of clear fluid removed and consistent with transudate  Consulted cardiology regarding difficult diuresis  TTE performed 8/31 and showed preserved LVEF with new evidence of decreased RV systolic function  -- Cardiology recommended holding diuresis given her pre-load dependence with acute right sided heart failure  -- Pulmonary consulted to assist with further cares  -- Continue current nebs and pulmonary hygiene  -- ID recommended no changes to current antibiotic plan since current respiratory failure is  volume related  -- Ceftriaxone completed 09/05. ID now signed off. Will follow up CT Chest with Pulmonary  ---Seen by SLP, recommend regular/thin liquids  ---Follow up in Pulmonary clinic 09/25, plan CT chest to be done prior to visit  -- Follow up with Surgery outpatient  -- Cards re-consulted 9/3 with ongoing issues with her volume status, diuresing as mentioned below  -- O2 improved to 2L today  -- Patient has been declining slowly and has had multiple rapid responses.Given the overall gradual decline,consulted palliative to assist with goals of care discussion and code changed to DNI.      Recurrent right sided pleural effuison: CXR 9/3 shows recurrent right sided pleural effusion that pulmonary thinks is too small to tap. There is evidence of great response to recent left sided thoracentesis  -- RR called 9/4, repeat chest xray pleural fluid/thickening throughout the periphery of right hemithorax      Paroxysmal A-fib/a flutter; status post PPM  Aortic stenosis status post TAVR  Acute on chronic HFpEF with new right sided acute systolic CHF  TTE 8/31 showed preserved EF with new evidence of decreased RV systolic function  -- Has been on multiple meds like metoprolol, diltiazem and digoxin with holding parameters. Dilt and metoprolol now stopped.   -- On Eliquis.  -- Cardiology signed off x 2, re-consulted on 9/3 and is following  -- Current regimen includes patient to be on dobutamine drip  -- She is off amiodarone due to QTc prolongation. S/p amiodarone bolus X 1 9/4  -- If continues to have elevated heart rate, can consider amiodarone drip with strict monitoring of her QT interval.  -- Patient diuresis was challenging given her hypotension, blood pressure currently stable.  -- Started on lasix drip 9/4. S/p metolazone X1  and now off. Good response to diuresis noted  -- Chairez placed 9/4 for accurate I/O monitoring.  -- Cardiology and EP following       Metabolic alkalosis:  Suspect primary metabolic alkalosis  due to Lasix and poor oral intake  -- Acetazolamide for contraction alkalosis    Pancytopenia  Acute on chronic anemia  --Noted Hb 6.8, s/p 1 U PRBC 9/4  --Monitor closely as pt on DOAC.  --No S/S of active bleeding, if Hb continues to drop will hold DOAC  --Repeat labs in AM       Deconditioning   -- PT/OT recommend TCU      Hyperlipidemia: continue statin       DM2:  Not on home hypoglycemic meds  Last A1c 6.2  -- Continue sliding scale insulin        Sacral erythema  Berna anal skin breakdown  -- Calmoseptine, WOC consulted       Epigastric pain  Denies nausea, vomting  -- continue PPI, Sucralfate, and Maalox prn      Deconditioned due to acute medical illness  -PT/OT following    Goals of care  -Palliative following. Pt DNI now          Diet: Regular Diet Adult  Snacks/Supplements Adult: Glucerna; With Meals  Fluid restriction 1800 ML FLUID    DVT Prophylaxis: DOAC  Chairez Catheter: PRESENT, indication: Non-ICU: q2 hour intake/output with documentation  Lines: PRESENT      PICC 08/10/24 Triple Lumen Right Brachial vein medial-Site Assessment: WDL      Cardiac Monitoring: ACTIVE order. Indication: Tachyarrhythmias, acute (48 hours)  Code Status:        Clinically Significant Risk Factors              # Hypoalbuminemia: Lowest albumin = 2.2 g/dL at 8/18/2024  7:04 AM, will monitor as appropriate   # Thrombocytopenia: Lowest platelets = 115 in last 2 days, will monitor for bleeding   # Hypertension: Noted on problem list                 # Financial/Environmental Concerns: none   # Pacemaker present         Disposition Plan   Medically Ready for Discharge: Anticipated in 2-4 Days      Yasmine Gifford MD  Hospitalist Service  [facility]  Securely message with [employer] (more info)  Text page via [employer] Paging/Directory   ______________________________________________________________________    Interval History   Chart reviewed and events noted.  Patient seen and examined.   States SOB improved.  Coughing+. Appetite not good. No fever or chills    Physical Exam   Vital Signs: Temp: 99.4  F (37.4  C) Temp src: Oral BP: 104/61 Pulse: 106   Resp: 20 SpO2: 94 % O2 Device: Nasal cannula Oxygen Delivery: 2 LPM  Weight: 132 lbs 7.94 oz    General: Elderly female in no distress  HEENT:EOMI, AT,NC  CVS: RRR  RS: Diminished breath sounds  Neurology: Awake, alert, oriented, moving all extremities  Psy: Calm         >50 MINUTES of critical time SPENT BY ME on the date of service doing chart review, history, exam, documentation & further activities per the note.    Plan discussed with patient, cardiology, SW/CM    Data

## 2024-09-06 NOTE — CONSULTS
Care Management Follow Up    Length of Stay (days): 19    Expected Discharge Date: 09/07/2024     Concerns to be Addressed: discharge planning     Patient plan of care discussed at interdisciplinary rounds: Yes    Anticipated Discharge Disposition: Transitional Care, Skilled Nursing Facility vs home with hospice  Disposition Comments: [id number]     Anticipated Discharge Services: TCU vs home with hospice  Anticipated Discharge DME: None    Patient/family educated on Medicare website which has current facility and service quality ratings: yes  Education Provided on the Discharge Plan: Yes  Patient/Family in Agreement with the Plan: yes    Referrals Placed by CM/SW: Post Acute Facilities  Private pay costs discussed: Not applicable    Discussed  Partnership in Safe Discharge Planning  document with patient/family: No     Handoff Completed: No, handoff not indicated or clinically appropriate    Additional Information:  Previously assessed  PC to son to discuss plans. Son reports that they have a 1pm appointment with hospice to discuss that option. He is leaning towards home with hospice for his mother.    Next Steps: Follow up with son to determine TCU vs hospice    JOSEPH Goss  1:19 PM  09/06/24

## 2024-09-06 NOTE — PROGRESS NOTES
PALLIATIVE CARE PROGRESS NOTE  [facility]     Patient Name: Mary Kay Tejada  Date of Admission: 8/17/2024   Today the patient was seen for: support, continued goals of care discussion.     Recommendations & Counseling       GOALS OF CARE:   life prolonging with limits (DNI), patient and family wish to pivot to comfort at discharge w hospice .   If further clinical decline and life expectancy contracts to hours-short days, would want comfort measures only in hospital (could pivot to GIP if family wishing to pursue).    ADVANCE CARE PLANNING:  No health care directive on file. Per system policy, Surrogate Decision-makers for Patients With Diminished Decision-making Capacity offers guidance on possible decision-makers. Son Heriberto has been identified as a surrogate decision maker. Had completed HCD in the past but it was considered invalid as the notary was also a member of her household.  There is no POLST form on file, recommend to complete prior to DC.  Code status:  DNR/DNI    MEDICAL MANAGEMENT:   We are not actively managing symptoms at this time.  Palliative was not consulted for symptom management, but routine assessment completed and reveals the following:      Pain, chronic, neuropathic (favor diabetic neuropathy), musculoskeletal  - on oxycodone 5-10mg q4H prn severe pain at home.  Not requiring here.  - was on gabapentin 600mg TID (may have been contributing to somnolence now as creatinine is rising--agree w 300 TID, if further sleepiness continue to taper the medication.      Delirium, hypoactive  - has had mild episodes at home, no history of cognitive impairment or dementia.  - avoid anticholinergics, antihistamines and benzodiazepines unless necessary.  - encourage family presence, optimize sleep and attend to elimination.     Cachexia, favor pulmonary, potentially combined with cardiac.  - diet as tolerated (minimal intake past several days, poor intake past several months.     - portends poor prognosis.    PSYCHOSOCIAL/SPIRITUAL:  Family son Heriberto, daughter in law Cynthia; daughter Shalini  nine years ago.  Friends   [place] community: [employer]     Palliative Care will continue to follow. Thank you for the consult and allowing us to aid in the care of Mary Kay Tejada.    These recommendations have been discussed with care management and patient/family and update sent to Dr Ирина Fajardo MD  [facility], Palliative Care  Securely message with the Vocera Web Console (learn more here) or  Text page via [facility] Paging/Directory        Assessment            Mary Kay Tejada is a 74 year old female with a past medical history of HTN, CAD, HFpEF, atrial fibrillation on chronic anticoagulation, aortic stenosis (s/p TAVR), PPM, COPD, tobacco abuse, history recent COVID in July, and combined hypercapnic/hypoxic respiratory failure after July hospital stay.  She also has history of chronic pain.    Mary Kay  presented on 24 to [facility] after having been admitted to  8/10/24 with dyspnea and found to have large R pleural effusion, empyema dx and chest tube was placed and bronchoscopy performed, lytic treatment for loculated strep pneumonia large effusion, and ultimately transferred 24 to [facility] for surgical intervention with VATS and decortication 24.  Today is hospital day #19; she has struggled with decompensated cardiomyopathy (now on dobutamine gtt + furosemide gtt), atrial fib/flutter w RVR, RV failure (new) with hypotension limiting tolerance of diuresis.    Pulmonary medicine has indicated no further treatments of the R sided effusion are advised other than diuresis.  She is s/p VATS, decortication, and no further interventions are recommended with declining functional status.  She has been unable to get out of bed or reposition herself without support.      Interval History:     Multidisciplinary collaboration:  Chart reviewed, notes from cardiology,  "nursing, RT, care managment, pulmonary med and [facility] reviewed and appreciated.  Good diuresis response to furosemide/dobutamine gtt.  Mary Kay declined morning nebs.    Patient/family narrative  Betsy notes she'd like to rest more this morning.  Was not able to voice a preference regarding CPR.    Notes she wants to go home, but needs to \"get better\" first.  I asked what getting better means to her, she noted it would mean being able to get up and move, walk again and do what she's been able to do before.    Follow up meeting with Betsy, son Heriberto, and Betsy's nephew Lesley.  Betsy and Heriberto agreed that she would not wish to go through CPR and assented to the writer placing DNR/DNI order.  Family would want to have Betsy return home as soon as able--they would like guidance from [facility] and cardiology on support to get plan in place for discharge home (? Potentially Monday?).        Review of Systems:     Denies pain, denies dyspnea at rest.  Endorses low appetite (no breakfast this morning) and no nausea.       Physical Exam:   Temp:  [98  F (36.7  C)-99.7  F (37.6  C)] 99.4  F (37.4  C)  Pulse:  [] 104  Resp:  [20-22] 20  BP: (103-114)/(51-73) 113/57  SpO2:  [82 %-100 %] 94 %  132 lbs 7.94 oz    Vitals:    08/28/24 0733 09/02/24 0552 09/04/24 0329 09/05/24 0324   Weight: 70.7 kg (155 lb 13.8 oz) 66.1 kg (145 lb 11.6 oz) 65.7 kg (144 lb 13.5 oz) 63.9 kg (140 lb 14 oz)    09/06/24 0330   Weight: 60.1 kg (132 lb 7.9 oz)       Intake/Output Summary (Last 24 hours) at 9/6/2024 1243  Last data filed at 9/6/2024 1100  Gross per 24 hour   Intake 808.9 ml   Output 3775 ml   Net -2966.1 ml       Physical Exam  Somnolent on first visit, on second visit Betsy is alert, oriented x3.  Speech fluent.  Edentulous.  Neck supple.  Heart irreg irreg, soft systolic murmur.  Breathing nonlabored. Diminished breath sounds in bases, scattered crackles.  Abdomen soft, nontender.  Extremities are without edema.  Affect full.        Data " "Reviewed:     Last Comprehensive Metabolic Panel:  Lab Results   Component Value Date     09/06/2024    POTASSIUM 3.8 09/06/2024    CHLORIDE 84 (L) 09/06/2024    CO2 43 (H) 09/06/2024    ANIONGAP 10 09/06/2024     (H) 09/06/2024    BUN 23.0 09/06/2024    CR 0.82 09/06/2024    GFRESTIMATED 75 09/06/2024    JOEY 9.5 09/06/2024       CBC RESULTS:   Recent Labs   Lab Test 09/05/24  0517   WBC 1.8*   RBC 3.33*   HGB 8.4*   HCT 28.7*   MCV 86   MCH 25.2*   MCHC 29.3*   RDW 19.1*   *     Lab Results   Component Value Date    AST 20 08/18/2024     Lab Results   Component Value Date    ALT 31 08/18/2024     No results found for: \"BILICONJ\"   Lab Results   Component Value Date    BILITOTAL 0.4 08/18/2024     Lab Results   Component Value Date    ALBUMIN 2.2 08/18/2024    ALBUMIN 3.3 06/30/2023     Lab Results   Component Value Date    PROTTOTAL 5.9 08/31/2024      Lab Results   Component Value Date    ALKPHOS 105 08/18/2024     No new imaging since last visit.    55 minutes spent on the date of the encounter doing chart review, history and exam, documentation and further activities per the note.    Jayshree Fajardo MD  [facility], Palliative Care  Securely message with the [employer] Web Console (learn more here) or  Text page via [facility] Paging/Directory          "

## 2024-09-07 LAB
ANION GAP SERPL CALCULATED.3IONS-SCNC: 11 MMOL/L (ref 7–15)
BUN SERPL-MCNC: 34.2 MG/DL (ref 8–23)
CALCIUM SERPL-MCNC: 9.1 MG/DL (ref 8.8–10.4)
CHLORIDE SERPL-SCNC: 83 MMOL/L (ref 98–107)
CREAT SERPL-MCNC: 0.99 MG/DL (ref 0.51–0.95)
EGFRCR SERPLBLD CKD-EPI 2021: 60 ML/MIN/1.73M2
GLUCOSE BLDC GLUCOMTR-MCNC: 121 MG/DL (ref 70–99)
GLUCOSE BLDC GLUCOMTR-MCNC: 127 MG/DL (ref 70–99)
GLUCOSE BLDC GLUCOMTR-MCNC: 138 MG/DL (ref 70–99)
GLUCOSE BLDC GLUCOMTR-MCNC: 140 MG/DL (ref 70–99)
GLUCOSE SERPL-MCNC: 116 MG/DL (ref 70–99)
HCO3 SERPL-SCNC: 43 MMOL/L (ref 22–29)
MAGNESIUM SERPL-MCNC: 2.2 MG/DL (ref 1.7–2.3)
POTASSIUM SERPL-SCNC: 3.1 MMOL/L (ref 3.4–5.3)
POTASSIUM SERPL-SCNC: 4.1 MMOL/L (ref 3.4–5.3)
SODIUM SERPL-SCNC: 137 MMOL/L (ref 135–145)

## 2024-09-07 PROCEDURE — 999N000157 HC STATISTIC RCP TIME EA 10 MIN

## 2024-09-07 PROCEDURE — 99232 SBSQ HOSP IP/OBS MODERATE 35: CPT | Performed by: INTERNAL MEDICINE

## 2024-09-07 PROCEDURE — 94640 AIRWAY INHALATION TREATMENT: CPT | Mod: 76

## 2024-09-07 PROCEDURE — 210N000001 HC R&B IMCU HEART CARE

## 2024-09-07 PROCEDURE — 250N000013 HC RX MED GY IP 250 OP 250 PS 637: Performed by: STUDENT IN AN ORGANIZED HEALTH CARE EDUCATION/TRAINING PROGRAM

## 2024-09-07 PROCEDURE — 999N000156 HC STATISTIC RCP CONSULT EA 30 MIN

## 2024-09-07 PROCEDURE — 250N000009 HC RX 250: Performed by: INTERNAL MEDICINE

## 2024-09-07 PROCEDURE — 250N000013 HC RX MED GY IP 250 OP 250 PS 637: Performed by: FAMILY MEDICINE

## 2024-09-07 PROCEDURE — 250N000013 HC RX MED GY IP 250 OP 250 PS 637: Performed by: HOSPITALIST

## 2024-09-07 PROCEDURE — 83735 ASSAY OF MAGNESIUM: CPT | Performed by: HOSPITALIST

## 2024-09-07 PROCEDURE — 250N000013 HC RX MED GY IP 250 OP 250 PS 637: Performed by: INTERNAL MEDICINE

## 2024-09-07 PROCEDURE — 250N000013 HC RX MED GY IP 250 OP 250 PS 637: Performed by: SPECIALIST

## 2024-09-07 PROCEDURE — 94640 AIRWAY INHALATION TREATMENT: CPT

## 2024-09-07 PROCEDURE — 250N000011 HC RX IP 250 OP 636: Performed by: INTERNAL MEDICINE

## 2024-09-07 PROCEDURE — 99233 SBSQ HOSP IP/OBS HIGH 50: CPT | Performed by: HOSPITALIST

## 2024-09-07 PROCEDURE — 250N000009 HC RX 250

## 2024-09-07 PROCEDURE — 80048 BASIC METABOLIC PNL TOTAL CA: CPT | Performed by: SPECIALIST

## 2024-09-07 PROCEDURE — 84132 ASSAY OF SERUM POTASSIUM: CPT | Performed by: HOSPITALIST

## 2024-09-07 PROCEDURE — 258N000003 HC RX IP 258 OP 636: Performed by: INTERNAL MEDICINE

## 2024-09-07 RX ORDER — POTASSIUM CHLORIDE 1.5 G/1.58G
40 POWDER, FOR SOLUTION ORAL ONCE
Status: COMPLETED | OUTPATIENT
Start: 2024-09-07 | End: 2024-09-07

## 2024-09-07 RX ORDER — FUROSEMIDE 40 MG
40 TABLET ORAL
Status: DISCONTINUED | OUTPATIENT
Start: 2024-09-07 | End: 2024-09-09 | Stop reason: HOSPADM

## 2024-09-07 RX ADMIN — Medication 1 SPRAY: at 12:57

## 2024-09-07 RX ADMIN — FUROSEMIDE 15 MG/HR: 10 INJECTION, SOLUTION INTRAVENOUS at 03:02

## 2024-09-07 RX ADMIN — SENNOSIDES AND DOCUSATE SODIUM 1 TABLET: 8.6; 5 TABLET ORAL at 08:19

## 2024-09-07 RX ADMIN — SODIUM CHLORIDE SOLN NEBU 3% 3 ML: 3 NEBU SOLN at 07:48

## 2024-09-07 RX ADMIN — IPRATROPIUM BROMIDE 0.5 MG: 0.5 SOLUTION RESPIRATORY (INHALATION) at 07:48

## 2024-09-07 RX ADMIN — GABAPENTIN 300 MG: 300 CAPSULE ORAL at 13:13

## 2024-09-07 RX ADMIN — FUROSEMIDE 15 MG/HR: 10 INJECTION, SOLUTION INTRAVENOUS at 10:16

## 2024-09-07 RX ADMIN — LEVALBUTEROL HYDROCHLORIDE 1.25 MG: 1.25 SOLUTION RESPIRATORY (INHALATION) at 20:23

## 2024-09-07 RX ADMIN — ACETAMINOPHEN 650 MG: 325 TABLET, FILM COATED ORAL at 08:19

## 2024-09-07 RX ADMIN — POTASSIUM CHLORIDE 40 MEQ: 1.5 POWDER, FOR SOLUTION ORAL at 06:34

## 2024-09-07 RX ADMIN — APIXABAN 5 MG: 5 TABLET, FILM COATED ORAL at 21:09

## 2024-09-07 RX ADMIN — SUCRALFATE 1 G: 1 TABLET ORAL at 11:51

## 2024-09-07 RX ADMIN — FUROSEMIDE 15 MG/HR: 10 INJECTION, SOLUTION INTRAVENOUS at 17:45

## 2024-09-07 RX ADMIN — Medication 1 SPRAY: at 16:06

## 2024-09-07 RX ADMIN — GABAPENTIN 300 MG: 300 CAPSULE ORAL at 08:18

## 2024-09-07 RX ADMIN — LIDOCAINE 1 PATCH: 4 PATCH TOPICAL at 21:08

## 2024-09-07 RX ADMIN — FUROSEMIDE 40 MG: 40 TABLET ORAL at 18:43

## 2024-09-07 RX ADMIN — LEVALBUTEROL HYDROCHLORIDE 1.25 MG: 1.25 SOLUTION RESPIRATORY (INHALATION) at 07:52

## 2024-09-07 RX ADMIN — APIXABAN 5 MG: 5 TABLET, FILM COATED ORAL at 08:17

## 2024-09-07 RX ADMIN — ACETAZOLAMIDE 500 MG: 500 INJECTION, POWDER, LYOPHILIZED, FOR SOLUTION INTRAVENOUS at 06:33

## 2024-09-07 RX ADMIN — DIGOXIN 125 MCG: 125 TABLET ORAL at 08:18

## 2024-09-07 RX ADMIN — SUCRALFATE 1 G: 1 TABLET ORAL at 17:41

## 2024-09-07 RX ADMIN — POLYETHYLENE GLYCOL 3350 17 G: 17 POWDER, FOR SOLUTION ORAL at 08:21

## 2024-09-07 RX ADMIN — ACETYLCYSTEINE 2 ML: 200 SOLUTION ORAL; RESPIRATORY (INHALATION) at 20:24

## 2024-09-07 RX ADMIN — IPRATROPIUM BROMIDE 0.5 MG: 0.5 SOLUTION RESPIRATORY (INHALATION) at 20:23

## 2024-09-07 RX ADMIN — ACETYLCYSTEINE 2 ML: 200 SOLUTION ORAL; RESPIRATORY (INHALATION) at 07:47

## 2024-09-07 RX ADMIN — SUCRALFATE 1 G: 1 TABLET ORAL at 06:33

## 2024-09-07 RX ADMIN — ACETAMINOPHEN 650 MG: 325 TABLET, FILM COATED ORAL at 12:54

## 2024-09-07 RX ADMIN — PANTOPRAZOLE SODIUM 40 MG: 40 TABLET, DELAYED RELEASE ORAL at 06:33

## 2024-09-07 RX ADMIN — SENNOSIDES AND DOCUSATE SODIUM 1 TABLET: 8.6; 5 TABLET ORAL at 21:09

## 2024-09-07 RX ADMIN — SODIUM CHLORIDE SOLN NEBU 3% 3 ML: 3 NEBU SOLN at 20:23

## 2024-09-07 RX ADMIN — ATORVASTATIN CALCIUM 20 MG: 10 TABLET, FILM COATED ORAL at 21:09

## 2024-09-07 RX ADMIN — GABAPENTIN 300 MG: 300 CAPSULE ORAL at 21:09

## 2024-09-07 RX ADMIN — Medication 1 SPRAY: at 08:21

## 2024-09-07 ASSESSMENT — ACTIVITIES OF DAILY LIVING (ADL)
ADLS_ACUITY_SCORE: 51

## 2024-09-07 NOTE — TREATMENT PLAN
"/56 (BP Location: Left arm)   Pulse 89   Temp 97.6  F (36.4  C) (Oral)   Resp 20   Ht 1.651 m (5' 5\")   Wt 59.1 kg (130 lb 4.7 oz)   LMP  (LMP Unknown)   SpO2 96%   BMI 21.68 kg/m          Allergies   Allergen Reactions    Celebrex [Celecoxib] Rash     patient had butterfly rash - \"lupus-like\"      Latex Rash        RCAT Treatment Plan    Patient Score: 14    Patient Acuity: 3    Clinical Indication for Therapy:copd/assist with expectoration  Therapy Ordered: QID: Xopenex/Mucomyst/Atrovent/NaCl. Aerobika QID. Will continue current ordered therapy.     Assessment Summary: Breath sounds decreased, with few crackles. Weak NPC. O2 4 lpm via oxymask. No accessory muscle use. No nasal flaring. No retractions. Good voicing. Pt speaks in complete sentences. Pt alert and oriented. Will adjust therapy as indicated. Pt followed by pulmonary med.       Amor Arnold, RT   9/7/2024         "

## 2024-09-07 NOTE — PROGRESS NOTES
[facility]  Cardiology      Progress Note: Jacques Tam MD    Primary Care: Cammy Desai    Primary Cardiology:Bao Pizano MD    Assessment:       HFpEF: Patient with improvement in her respiratory status following a net diuresis of 12.7 L.  She now appears to be euvolemic and will plan to switch from IV infusion to oral Lasix today.  Patient is off IV dobutamine infusion.  Patient and family plan is to try and have her discharged by Monday to be able to enter hospice care.  This appears to be a reasonable goal.  Persistent atrial fibrillation: Rate control strategy (digoxin) and patient is at target.   Patient is on DOAC (apixaban).      Active Problems:    Empyema (H)     LOS: 20 days       Recommendations:  Discontinue IV furosemide infusion  Initiate oral furosemide.  Continue other cardiac medications  Anticipate discharge Monday.    Time spent: I have spent 40 minutes with the patient, and >50% of which was spent on coordination of care and counseling as outlined above.      Subjective:  Mary Kay Tejada (74 year old female) is new to me, chart is reviewed and the patient is examined.  Patient reports that she feels like her breathing is improved.  Her multiple family members are present and note that she seems to be comfortable.  Tolerating her medications well and has no other complaints today.    Current Outpatient Medications   Medication Sig Dispense Refill    cefTRIAXone (ROCEPHIN) 2 GM vial Inject 2 g over 30 minutes into the vein every 24 hours for 13 days. Last day 9/10/24. Weekly labs while on IV antibiotics: CBC with differential, CMP,, C-reactive protein. Fax lab results to Dr. Bacon at [phone]         Objective:   Vital signs in last 24 hours:  Temp:  [97.5  F (36.4  C)-98.7  F (37.1  C)] 98.1  F (36.7  C)  Pulse:  [89-93] 89  Resp:  [16-20] 20  BP: (100-116)/(56-67) 105/56  SpO2:  [94 %-100 %] 100 %  Weight:   [unfilled]   Weight change: -1 kg (-2 lb  3.3 oz)      Physical Exam:  General: The patient is alert oriented to person place and situation.  The patient is in no acute distress at the time of my evaluation.  Eyes: Pupils are equal, round, and reactive to light.  Conjunctiva and sclera are clear.  ENT: Oral mucosa is moist and without redness. No evident nasal discharge.  Pulmonary: Lungs are notable for fairly good breath sounds bilaterally.  I do not appreciate any rales.      Cardiovascular exam: Rhythm is irregular. S1 and S2 are normal. No apparent lobo. No significant murmur is present. JVP is normal. Lower extremities demonstrate no significant edema. Distal pulses are intact bilaterally.  Abdomen is soft, nontender, with no organomegaly. Bowel sounds are present.  Musculoskeletal: Spine is straight. Extremities without deformity.  Neuro: Gait is observed as the patient is at bedrest.     Skin is warm, dry, and otherwise intact.        Cardiographics:   Cardiac telemetry demonstrates atrial fibrillation with controlled ventricular response.    Radiology:      Lab Results:   Lab Results   Component Value Date     09/07/2024    CO2 43 09/07/2024    CO2 27 06/30/2023    BUN 34.2 09/07/2024    BUN 16 06/30/2023     Lab Results   Component Value Date    WBC 1.8 09/05/2024    HGB 8.4 09/05/2024    HCT 28.7 09/05/2024    MCV 86 09/05/2024     09/05/2024     Lab Results   Component Value Date    INR 1.81 08/31/2024       Outside record review:

## 2024-09-07 NOTE — PLAN OF CARE
Problem: Gas Exchange Impaired  Goal: Optimal Gas Exchange  Outcome: Progressing  Intervention: Optimize Oxygenation and Ventilation  Recent Flowsheet Documentation  Taken 9/7/2024 0838 by Sharlene Collins RN  Head of Bed ([facility]) Positioning: HOB at 20-30 degrees     Problem: Skin Injury Risk Increased  Goal: Skin Health and Integrity  Outcome: Progressing  Intervention: Plan: Nurse Driven Intervention: Moisture Management  Recent Flowsheet Documentation  Taken 9/7/2024 1216 by Sharlene Collins RN  Moisture Interventions: No brief in bed  Bathing/Skin Care: wipes, CHG  Taken 9/7/2024 0840 by Sharlene Collins RN  Moisture Interventions: No brief in bed  Taken 9/7/2024 0800 by Sharlene Collins RN  Moisture Interventions:   No brief in bed   Incontinence pad   Urinary collection device  Bathing/Skin Care: moisturizer applied  Intervention: Plan: Nurse Driven Intervention: Friction and Shear  Recent Flowsheet Documentation  Taken 9/7/2024 1216 by Sharlene Collins RN  Friction/Shear Interventions: HOB 30 degrees or less  Taken 9/7/2024 0840 by Sharlene Collins RN  Friction/Shear Interventions: HOB 30 degrees or less  Intervention: Optimize Skin Protection  Recent Flowsheet Documentation  Taken 9/7/2024 1216 by Sharlene Collins RN  Activity Management: activity adjusted per tolerance  Taken 9/7/2024 0840 by Sharlene Collins RN  Activity Management: activity adjusted per tolerance  Taken 9/7/2024 0838 by Sharlene Collins RN  Head of Bed ([facility]) Positioning: HOB at 20-30 degrees     Problem: Pain Acute  Goal: Optimal Pain Control and Function  Outcome: Progressing  Intervention: Prevent or Manage Pain  Recent Flowsheet Documentation  Taken 9/7/2024 1216 by Sharlene Collins RN  Sensory Stimulation Regulation: music on  Bowel Elimination Promotion: adequate fluid intake promoted  Taken 9/7/2024 0840 by Sharlene Collins RN  Bowel Elimination Promotion: adequate fluid intake promoted  Medication Review/Management:  medications reviewed  Intervention: Optimize Psychosocial Wellbeing  Recent Flowsheet Documentation  Taken 9/7/2024 1216 by Sharlene Collins, RN  Supportive Measures: active listening utilized  Taken 9/7/2024 0840 by Sharlene Collins, RN  Supportive Measures: active listening utilized   Goal Outcome Evaluation:  Patient 02 turned down to 1L sating 94% RA when awake. Complaining of back and knee pain given Tylenol every 4 hours. Declined anything stronger. Turns q2. Patient lethargic but aroused to sound conversing with family when awake. Ate breakfast, declined Lunch. Lasix Gtt discontinued at 1800 oral lasix started.

## 2024-09-07 NOTE — PLAN OF CARE
Problem: Adult Inpatient Plan of Care  Goal: Absence of Hospital-Acquired Illness or Injury  Intervention: Prevent Skin Injury  Recent Flowsheet Documentation  Taken 9/7/2024 0015 by Jamie Schaefer RN  Body Position: refuses positioning  Taken 9/6/2024 2053 by Jamie Schaefer RN  Body Position: weight shifting  Skin Protection: adhesive use limited  Device Skin Pressure Protection: absorbent pad utilized/changed     Problem: Adult Inpatient Plan of Care  Goal: Optimal Comfort and Wellbeing  Outcome: Progressing     Problem: Gas Exchange Impaired  Goal: Optimal Gas Exchange  Outcome: Progressing  Intervention: Optimize Oxygenation and Ventilation  Recent Flowsheet Documentation  Taken 9/7/2024 0015 by Jamie Schaefer RN  Head of Bed (HOB) Positioning: HOB flat  Taken 9/6/2024 2053 by Jamie Schaefer RN  Head of Bed (HOB) Positioning: HOB at 20-30 degrees   Goal Outcome Evaluation:               A&Ox4, but lethargic. Arouses to voice. Tele, SR. Oxymask 5 Liters, sating well overnight. Denies pain, SOB, and dizziness. PICC present, Lasix infusing at 15mg/hr. Bedrest, frequent repo. Chairez present, good urine output.

## 2024-09-07 NOTE — PROGRESS NOTES
[facility]    Medicine Progress Note - Hospitalist Service    Date of Admission:  8/17/2024    Assessment & Plan     Summary:  74-year-old female with history of a flutter, COPD, ongoing tobacco abuse, previous pleural effusions and chronic pain admitted 8/17/24 with empyema and transferred from [facility] to [facility] for surgical management.  Patient initially presented to [facility] on 8/10 with large right-sided effusion and found to have right-sided empyema with mucous plugging with strep pneumonia and E. coli pneumonia.  This does not drain well with chest tube and intrapleural lytics which is why patient was transferred to [facility] and underwent right VATS with chest tube placement on 8/19.  No decortication was done.  Patient was supposed to undergo bronchoscopy on 8/21 but was not done due to high O2 requirement.  Chest tube was removed on 8/25 subsequently.  Patient has had persistent loculated and complex appearing right effusion that has not changed.  Patient also had left-sided pleural effusion and 0.8 L was drained on 8/31. Patient is on antibiotics per ID recommendation.    Her stay here is complicated by development of new right-sided heart failure, persistent atrial flutter/fibrillation and anemia.  It has been challenging to diurese the patient given her ongoing hypotension.  Cardiology and EP are closely following.  Patient has been on intermittent diuresis and currently on Lasix drip as of 9/4.  Discussed with patient's son on 9/4 about the gradual decline of patient's clinical condition and palliative was consulted to assist with goals of care discussion. Code status changed to DNI     Empyema  Acute hypoxic respiratory failure  H/O recurrent right-sided pleural effusion/empyema status post bronchoscopy on 8/11/2024 with cultures that grew E. coli and Candida albicans.  Pleural effusion cultures growing strep pneumo  s/p VATS 8/19/24. No true emphyema or rind  found, no ability to expand lung  Repeat CT 08/20 showed right pleural effusion s/p chest tube placement, moderate likely loculated/complicated residual as well as small associated right pneumothorax, reexpansion of right upper lobe with persistent collapse of right middle and lower lobe segments.  Significant retained secretions greater in the right lower lobe and increasing left pleural effusion  Plan was for bronchoscopy 08/21, not done as patient was requiring upto 10L oxygen and had high risk of being intubated  Chest tube removed 08/25   Acute hypoxemic respiratory failure worse 8/29-8/31 and likely related to eventual findings of moderate left pleural effusion and acute right sided systolic CHF   8/29: patient with complaints of increased SOB, productive cough and weakness. CXR 8/29 shows Increased opacification in the left lower lobe of concern for worsening pneumonia. Of note patient had remained afebrile and WBC remains normal on IV ceftriaxone for pneumococcus and E coli . Yeast on BAL thought to be colonization  Discussed with ID 8/29 and they recommend no changes  Overnight 8/29-8/30 patient with worsening hypoxemic respiratory failure.   CXR 8/30 with evidence of worsening pulmonary edema and possibly improved LLL opacity.  BNP elevated but improved from previous  CT chest 8/31 showed moderate left pleural effusion and stable changes on right post surgery  S/p left sided thoracentesis 8/31 with 0.8L of clear fluid removed and consistent with transudate  Consulted cardiology regarding difficult diuresis  TTE performed 8/31 and showed preserved LVEF with new evidence of decreased RV systolic function  -- Cardiology recommended holding diuresis given her pre-load dependence with acute right sided heart failure  -- Pulmonary consulted to assist with further cares  -- Continue current nebs and pulmonary hygiene  -- ID recommended no changes to current antibiotic plan since current respiratory failure is  volume related  -- Ceftriaxone completed 09/05. ID now signed off. Will follow up CT Chest with Pulmonary  ---Seen by SLP, recommend regular/thin liquids  ---Follow up in Pulmonary clinic 09/25, plan CT chest to be done prior to visit  -- Follow up with Surgery outpatient  -- Cards re-consulted 9/3 with ongoing issues with her volume status, diuresing as mentioned below  -- O2 improved to 2L today. Did worsen overnight to 4-5L  -- Patient has been declining slowly and has had multiple rapid responses.Given the overall gradual decline,consulted palliative to assist with goals of care discussion and code changed to DNR/DNI. Plans to transition to home hospice on discharge 9/10.      Recurrent right sided pleural effuison: CXR 9/3 shows recurrent right sided pleural effusion that pulmonary thinks is too small to tap. There is evidence of great response to recent left sided thoracentesis  -- RR called 9/4, repeat chest xray pleural fluid/thickening throughout the periphery of right hemithorax      Paroxysmal A-fib/a flutter; status post PPM  Aortic stenosis status post TAVR  Acute on chronic HFpEF with new right sided acute systolic CHF  TTE 8/31 showed preserved EF with new evidence of decreased RV systolic function  -- Has been on multiple meds like metoprolol, diltiazem and digoxin with holding parameters. Dilt and metoprolol now stopped.   -- On Eliquis.  -- Cardiology signed off x 2, re-consulted on 9/3 and is following  -- Dobutamine drip stopped on 9/6 as pt leaning more towards hospice on discharge  -- She is off amiodarone due to QTc prolongation. S/p amiodarone bolus X 1 9/4  -- If continues to have elevated heart rate, can consider amiodarone drip with strict monitoring of her QT interval.  -- Patient diuresis was challenging given her hypotension, blood pressure currently stable.  -- Started on lasix drip 9/4. S/p metolazone X1  and now off. Good response to diuresis noted  -- Chairez placed 9/4 for accurate  I/O monitoring.  -- Cardiology and EP following       Metabolic alkalosis:  Suspect primary metabolic alkalosis due to Lasix and poor oral intake  -- Off acetazolamide.     Pancytopenia  Acute on chronic anemia  --Noted Hb 6.8, s/p 1 U PRBC 9/4  --Monitor closely as pt on DOAC.  --No S/S of active bleeding, if Hb continues to drop will hold DOAC      HypoK- Replete.       Hyperlipidemia: continue statin       DM2:  Not on home hypoglycemic meds  Last A1c 6.2  -- Continue sliding scale insulin        Sacral erythema  Berna anal skin breakdown  -- Calmoseptine, WOC consulted       Epigastric pain  Denies nausea, vomting  -- continue PPI, Sucralfate, and Maalox prn      Deconditioned due to acute medical illness  -PT/OT following, will be going home with hospice on discharge likely on 9/10    Goals of care  -Palliative following. Pt DNR/DNI          Diet: Regular Diet Adult  Snacks/Supplements Adult: Glucerna; With Meals  Fluid restriction 1800 ML FLUID    DVT Prophylaxis: DOAC  Chairez Catheter: PRESENT, indication: Non-ICU: q2 hour intake/output with documentation  Lines: PRESENT      PICC 08/10/24 Triple Lumen Right Brachial vein medial-Site Assessment: WDL      Cardiac Monitoring: ACTIVE order. Indication: Tachyarrhythmias, acute (48 hours)  Code Status: No CPR- Do NOT Intubate      Clinically Significant Risk Factors        # Hypokalemia: Lowest K = 3.1 mmol/L in last 2 days, will replace as needed       # Hypoalbuminemia: Lowest albumin = 2.2 g/dL at 8/18/2024  7:04 AM, will monitor as appropriate     # Hypertension: Noted on problem list                 # Financial/Environmental Concerns: none   # Pacemaker present         Disposition Plan   Medically Ready for Discharge: Anticipated in 2-4 Days      Yasmine Gifford MD  Hospitalist Service  [facility]  Securely message with [employer] (more info)  Text page via [employer] Paging/[employer]    ______________________________________________________________________    Interval History   Chart reviewed and events noted.  Patient seen and examined.   SOB improved. Did have some increase work of breathing later in the day yest. PO intake ok today. No chest pain, some nausea but no vomiting.     Physical Exam   Vital Signs: Temp: 97.6  F (36.4  C) Temp src: Oral BP: 110/56 Pulse: 89   Resp: 20 SpO2: 96 % O2 Device: Nasal cannula Oxygen Delivery: 2 LPM  Weight: 130 lbs 4.67 oz    General: Elderly female in no distress  HEENT:EOMI, AT,NC  CVS: RRR  RS: Diminished breath sounds  Neurology: Awake, alert, oriented, moving all extremities  Psy: Calm         >50 MINUTES of critical time SPENT BY ME on the date of service doing chart review, history, exam, documentation & further activities per the note.    Plan discussed with patient, bedside RN, SW/CM    Data

## 2024-09-08 PROBLEM — Z95.0 CARDIAC PACEMAKER IN SITU: Status: ACTIVE | Noted: 2024-09-08

## 2024-09-08 LAB
ANION GAP SERPL CALCULATED.3IONS-SCNC: 11 MMOL/L (ref 7–15)
BACTERIA BRONCH: ABNORMAL
BACTERIA BRONCH: ABNORMAL
BACTERIA BRONCH: NO GROWTH
BACTERIA BRONCH: NO GROWTH
BUN SERPL-MCNC: 41.6 MG/DL (ref 8–23)
CALCIUM SERPL-MCNC: 9.1 MG/DL (ref 8.8–10.4)
CHLORIDE SERPL-SCNC: 83 MMOL/L (ref 98–107)
CREAT SERPL-MCNC: 0.95 MG/DL (ref 0.51–0.95)
EGFRCR SERPLBLD CKD-EPI 2021: 63 ML/MIN/1.73M2
ERYTHROCYTE [DISTWIDTH] IN BLOOD BY AUTOMATED COUNT: 17.6 % (ref 10–15)
GLUCOSE BLDC GLUCOMTR-MCNC: 112 MG/DL (ref 70–99)
GLUCOSE BLDC GLUCOMTR-MCNC: 253 MG/DL (ref 70–99)
GLUCOSE BLDC GLUCOMTR-MCNC: 97 MG/DL (ref 70–99)
GLUCOSE SERPL-MCNC: 102 MG/DL (ref 70–99)
HCO3 SERPL-SCNC: 42 MMOL/L (ref 22–29)
HCT VFR BLD AUTO: 30.1 % (ref 35–47)
HGB BLD-MCNC: 8.7 G/DL (ref 11.7–15.7)
MAGNESIUM SERPL-MCNC: 2.3 MG/DL (ref 1.7–2.3)
MCH RBC QN AUTO: 24.4 PG (ref 26.5–33)
MCHC RBC AUTO-ENTMCNC: 28.9 G/DL (ref 31.5–36.5)
MCV RBC AUTO: 84 FL (ref 78–100)
PLATELET # BLD AUTO: 211 10E3/UL (ref 150–450)
POTASSIUM SERPL-SCNC: 3.4 MMOL/L (ref 3.4–5.3)
POTASSIUM SERPL-SCNC: 3.5 MMOL/L (ref 3.4–5.3)
RBC # BLD AUTO: 3.57 10E6/UL (ref 3.8–5.2)
SODIUM SERPL-SCNC: 136 MMOL/L (ref 135–145)
WBC # BLD AUTO: 1.4 10E3/UL (ref 4–11)

## 2024-09-08 PROCEDURE — 250N000013 HC RX MED GY IP 250 OP 250 PS 637: Performed by: HOSPITALIST

## 2024-09-08 PROCEDURE — 99232 SBSQ HOSP IP/OBS MODERATE 35: CPT | Performed by: INTERNAL MEDICINE

## 2024-09-08 PROCEDURE — 250N000013 HC RX MED GY IP 250 OP 250 PS 637: Performed by: STUDENT IN AN ORGANIZED HEALTH CARE EDUCATION/TRAINING PROGRAM

## 2024-09-08 PROCEDURE — 250N000013 HC RX MED GY IP 250 OP 250 PS 637: Performed by: FAMILY MEDICINE

## 2024-09-08 PROCEDURE — 999N000157 HC STATISTIC RCP TIME EA 10 MIN

## 2024-09-08 PROCEDURE — 210N000001 HC R&B IMCU HEART CARE

## 2024-09-08 PROCEDURE — 94640 AIRWAY INHALATION TREATMENT: CPT | Mod: 76

## 2024-09-08 PROCEDURE — 85027 COMPLETE CBC AUTOMATED: CPT | Performed by: HOSPITALIST

## 2024-09-08 PROCEDURE — 250N000009 HC RX 250

## 2024-09-08 PROCEDURE — 250N000009 HC RX 250: Performed by: INTERNAL MEDICINE

## 2024-09-08 PROCEDURE — 250N000013 HC RX MED GY IP 250 OP 250 PS 637: Performed by: INTERNAL MEDICINE

## 2024-09-08 PROCEDURE — 80048 BASIC METABOLIC PNL TOTAL CA: CPT | Performed by: SPECIALIST

## 2024-09-08 PROCEDURE — 250N000013 HC RX MED GY IP 250 OP 250 PS 637: Performed by: SPECIALIST

## 2024-09-08 PROCEDURE — 94640 AIRWAY INHALATION TREATMENT: CPT

## 2024-09-08 PROCEDURE — 999N000156 HC STATISTIC RCP CONSULT EA 30 MIN

## 2024-09-08 PROCEDURE — 99232 SBSQ HOSP IP/OBS MODERATE 35: CPT | Performed by: HOSPITALIST

## 2024-09-08 PROCEDURE — 84132 ASSAY OF SERUM POTASSIUM: CPT | Performed by: HOSPITALIST

## 2024-09-08 PROCEDURE — 83735 ASSAY OF MAGNESIUM: CPT | Performed by: HOSPITALIST

## 2024-09-08 RX ORDER — POTASSIUM CHLORIDE 1.5 G/1.58G
40 POWDER, FOR SOLUTION ORAL ONCE
Status: COMPLETED | OUTPATIENT
Start: 2024-09-08 | End: 2024-09-08

## 2024-09-08 RX ADMIN — ACETAMINOPHEN 650 MG: 325 TABLET, FILM COATED ORAL at 14:06

## 2024-09-08 RX ADMIN — Medication 1 SPRAY: at 09:59

## 2024-09-08 RX ADMIN — LIDOCAINE 1 PATCH: 4 PATCH TOPICAL at 21:07

## 2024-09-08 RX ADMIN — APIXABAN 5 MG: 5 TABLET, FILM COATED ORAL at 21:05

## 2024-09-08 RX ADMIN — GABAPENTIN 300 MG: 300 CAPSULE ORAL at 21:05

## 2024-09-08 RX ADMIN — SUCRALFATE 1 G: 1 TABLET ORAL at 05:55

## 2024-09-08 RX ADMIN — LEVALBUTEROL HYDROCHLORIDE 1.25 MG: 1.25 SOLUTION RESPIRATORY (INHALATION) at 07:16

## 2024-09-08 RX ADMIN — DIGOXIN 125 MCG: 125 TABLET ORAL at 10:41

## 2024-09-08 RX ADMIN — SODIUM CHLORIDE SOLN NEBU 3% 3 ML: 3 NEBU SOLN at 19:54

## 2024-09-08 RX ADMIN — ATORVASTATIN CALCIUM 20 MG: 10 TABLET, FILM COATED ORAL at 21:05

## 2024-09-08 RX ADMIN — IPRATROPIUM BROMIDE 0.5 MG: 0.5 SOLUTION RESPIRATORY (INHALATION) at 07:12

## 2024-09-08 RX ADMIN — PANTOPRAZOLE SODIUM 40 MG: 40 TABLET, DELAYED RELEASE ORAL at 05:55

## 2024-09-08 RX ADMIN — FUROSEMIDE 40 MG: 40 TABLET ORAL at 09:52

## 2024-09-08 RX ADMIN — LEVALBUTEROL HYDROCHLORIDE 1.25 MG: 1.25 SOLUTION RESPIRATORY (INHALATION) at 12:18

## 2024-09-08 RX ADMIN — GABAPENTIN 300 MG: 300 CAPSULE ORAL at 09:52

## 2024-09-08 RX ADMIN — FUROSEMIDE 40 MG: 40 TABLET ORAL at 18:01

## 2024-09-08 RX ADMIN — ACETYLCYSTEINE 2 ML: 200 SOLUTION ORAL; RESPIRATORY (INHALATION) at 12:18

## 2024-09-08 RX ADMIN — SODIUM CHLORIDE SOLN NEBU 3% 3 ML: 3 NEBU SOLN at 12:18

## 2024-09-08 RX ADMIN — APIXABAN 5 MG: 5 TABLET, FILM COATED ORAL at 09:53

## 2024-09-08 RX ADMIN — IPRATROPIUM BROMIDE 0.5 MG: 0.5 SOLUTION RESPIRATORY (INHALATION) at 16:59

## 2024-09-08 RX ADMIN — SODIUM CHLORIDE SOLN NEBU 3% 3 ML: 3 NEBU SOLN at 16:59

## 2024-09-08 RX ADMIN — LEVALBUTEROL HYDROCHLORIDE 1.25 MG: 1.25 SOLUTION RESPIRATORY (INHALATION) at 16:59

## 2024-09-08 RX ADMIN — ACETYLCYSTEINE 2 ML: 200 SOLUTION ORAL; RESPIRATORY (INHALATION) at 07:16

## 2024-09-08 RX ADMIN — IPRATROPIUM BROMIDE 0.5 MG: 0.5 SOLUTION RESPIRATORY (INHALATION) at 12:18

## 2024-09-08 RX ADMIN — ACETYLCYSTEINE 2 ML: 200 SOLUTION ORAL; RESPIRATORY (INHALATION) at 16:59

## 2024-09-08 RX ADMIN — LEVALBUTEROL HYDROCHLORIDE 1.25 MG: 1.25 SOLUTION RESPIRATORY (INHALATION) at 19:54

## 2024-09-08 RX ADMIN — SODIUM CHLORIDE SOLN NEBU 3% 3 ML: 3 NEBU SOLN at 07:15

## 2024-09-08 RX ADMIN — POLYETHYLENE GLYCOL 3350 17 G: 17 POWDER, FOR SOLUTION ORAL at 09:49

## 2024-09-08 RX ADMIN — POTASSIUM CHLORIDE 40 MEQ: 1.5 POWDER, FOR SOLUTION ORAL at 05:55

## 2024-09-08 RX ADMIN — IPRATROPIUM BROMIDE 0.5 MG: 0.5 SOLUTION RESPIRATORY (INHALATION) at 19:54

## 2024-09-08 ASSESSMENT — ACTIVITIES OF DAILY LIVING (ADL)
ADLS_ACUITY_SCORE: 51

## 2024-09-08 NOTE — PLAN OF CARE
"  Problem: Gas Exchange Impaired  Goal: Optimal Gas Exchange  Outcome: Progressing  Intervention: Optimize Oxygenation and Ventilation  Recent Flowsheet Documentation  Taken 9/7/2024 2143 by Jamie Schaefer RN  Head of Bed (HOB) Positioning: HOB at 20-30 degrees     Problem: Dysrhythmia  Goal: Normalized Cardiac Rhythm  Outcome: Progressing     Problem: Risk for Delirium  Goal: Improved Sleep  Outcome: Progressing     Problem: Skin Injury Risk Increased  Goal: Skin Health and Integrity  Outcome: Progressing  Intervention: Optimize Skin Protection  Recent Flowsheet Documentation  Taken 9/7/2024 2143 by Jamie Schaefer RN  Head of Bed (HOB) Positioning: HOB at 20-30 degrees   Goal Outcome Evaluation:             A&Ox4, Lethargic but arouses to voice.  Bedrest., pt very weak and shaky. Tele,SR w/PACs. 3 Liter oxymask applied during sleep due to sats in high 80s and pt mouth breathing, 1 Liter N.C during the day. 1800 F.R. PICC, WDL.  Refused repo twice during the night.        0508 Lab called about critical lab, delta plt 211, paged Dr. Durbin and MD replied back with \"That should be ok for plts\"    "

## 2024-09-08 NOTE — PLAN OF CARE
Problem: Palliative Care  Goal: Enhanced Quality of Life  Outcome: Progressing  Intervention: Optimize Function  Recent Flowsheet Documentation  Taken 9/8/2024 0818 by Sharlene Collins, RN  Sensory Stimulation Regulation: music on  Intervention: Optimize Psychosocial Wellbeing  Recent Flowsheet Documentation  Taken 9/8/2024 0818 by Sharlene Collins, RN  Supportive Measures: active listening utilized   Goal Outcome Evaluation:       Patient & family planning on discharging home to hospice tomorrow. Patient sating 93 % on 1-2 L most of the day. However required Oxy mask this am and overnight. Sleeping most of the day but arouses to voice and converses with family for short periods. Eating and drinking very little. Discontinued fluid restriction so patient could order soup for dinner from cafeteria as patients goal is comfort care.

## 2024-09-08 NOTE — PROGRESS NOTES
[facility]  Cardiac Electrophysiology     Progress Note: Jacques Tam MD    Primary Care: Cammy Desai    Primary Cardiology: Bao Pizano MD    Assessment:       HFpEF: Patient with improvement in her respiratory status following a net diuresis of 12.2 L.  She now appears to be euvolemic and she was converted to oral furosemide yesterday.  Patient and family are planning on discharge tomorrow with plans to enroll in hospice care at that time.    Persistent atrial fibrillation: Rate control strategy (digoxin) and patient is at target.   Patient is on DOAC (apixaban).  Empyema: Antibiotics as outlined by the primary care team    Active Problems:    Empyema (H)     LOS: 21 days       Recommendations:  No change in medications today.  Anticipate patient will be ready for discharge tomorrow.    Time spent: I have spent 30 minutes with the patient, and >50% of which was spent on coordination of care and counseling as outlined above.      Subjective:  Mary Kay Tejada (74 year old female) is seen in follow-up for her HFpEF, and atrial fibrillation.  Patient reports that she feels about the same.  She reports that she is not uncomfortable and is not experiencing any pain.  She continues to endorse that she would like to go home tomorrow.    No current outpatient medications on file.       Objective:   Vital signs in last 24 hours:  Temp:  [98  F (36.7  C)-99.4  F (37.4  C)] 98  F (36.7  C)  Pulse:  [] 99  Resp:  [14-20] 14  BP: (105-128)/(55-59) 128/59  SpO2:  [74 %-100 %] 96 %  Weight:   [unfilled]   Weight change: -0.7 kg (-1 lb 8.7 oz)      Physical Exam:  General: The patient is alert oriented to person place and situation.  The patient is in no acute distress at the time of my evaluation.  Eyes: Pupils are equal, round, and reactive to light.  Conjunctiva and sclera are clear.  ENT: Oral mucosa is moist and without redness. No evident nasal discharge.      Cardiographics:    Cardiac telemetry, personally reviewed demonstrates atrial fibrillation with ventricular response in the  bpm range    Radiology:      Lab Results:   Lab Results   Component Value Date     09/08/2024    CO2 42 09/08/2024    CO2 27 06/30/2023    BUN 41.6 09/08/2024    BUN 16 06/30/2023     Lab Results   Component Value Date    WBC 1.4 09/08/2024    HGB 8.7 09/08/2024    HCT 30.1 09/08/2024    MCV 84 09/08/2024     09/08/2024     Lab Results   Component Value Date    INR 1.81 08/31/2024       Outside record review:

## 2024-09-08 NOTE — PROGRESS NOTES
[facility]    Medicine Progress Note - Hospitalist Service    Date of Admission:  8/17/2024    Assessment & Plan     Summary:  74-year-old female with history of a flutter, COPD, ongoing tobacco abuse, previous pleural effusions and chronic pain admitted 8/17/24 with empyema and transferred from [facility] to [facility] for surgical management.  Patient initially presented to [facility] on 8/10 with large right-sided effusion and found to have right-sided empyema with mucous plugging with strep pneumonia and E. coli pneumonia.  This does not drain well with chest tube and intrapleural lytics which is why patient was transferred to [facility] and underwent right VATS with chest tube placement on 8/19.  No decortication was done.  Patient was supposed to undergo bronchoscopy on 8/21 but was not done due to high O2 requirement.  Chest tube was removed on 8/25 subsequently.  Patient has had persistent loculated and complex appearing right effusion that has not changed.  Patient also had left-sided pleural effusion and 0.8 L was drained on 8/31. Patient is on antibiotics per ID recommendation.    Her stay here is complicated by development of new right-sided heart failure, persistent atrial flutter/fibrillation and anemia.  It has been challenging to diurese the patient given her ongoing hypotension.  Cardiology and EP are closely following.  Patient has been on intermittent diuresis and than was on Lasix drip as of 9/4-->transitioned to PO 9/7. Discussed with patient's son on 9/4 about the gradual decline of patient's clinical condition and palliative was consulted to assist with goals of care discussion. Code status changed to DNR/DNI with plans to discharging home 9/9 with home hospice     Empyema  Acute hypoxic respiratory failure  H/O recurrent right-sided pleural effusion/empyema status post bronchoscopy on 8/11/2024 with cultures that grew E. coli and Candida albicans.  Pleural  effusion cultures growing strep pneumo  s/p VATS 8/19/24. No true emphyema or rind found, no ability to expand lung  Repeat CT 08/20 showed right pleural effusion s/p chest tube placement, moderate likely loculated/complicated residual as well as small associated right pneumothorax, reexpansion of right upper lobe with persistent collapse of right middle and lower lobe segments.  Significant retained secretions greater in the right lower lobe and increasing left pleural effusion  Plan was for bronchoscopy 08/21, not done as patient was requiring upto 10L oxygen and had high risk of being intubated  Chest tube removed 08/25   Acute hypoxemic respiratory failure worse 8/29-8/31 and likely related to eventual findings of moderate left pleural effusion and acute right sided systolic CHF   8/29: patient with complaints of increased SOB, productive cough and weakness. CXR 8/29 shows Increased opacification in the left lower lobe of concern for worsening pneumonia. Of note patient had remained afebrile and WBC remains normal on IV ceftriaxone for pneumococcus and E coli . Yeast on BAL thought to be colonization  Discussed with ID 8/29 and they recommend no changes  Overnight 8/29-8/30 patient with worsening hypoxemic respiratory failure.   CXR 8/30 with evidence of worsening pulmonary edema and possibly improved LLL opacity.  BNP elevated but improved from previous  CT chest 8/31 showed moderate left pleural effusion and stable changes on right post surgery  S/p left sided thoracentesis 8/31 with 0.8L of clear fluid removed and consistent with transudate  Consulted cardiology regarding difficult diuresis  TTE performed 8/31 and showed preserved LVEF with new evidence of decreased RV systolic function  -- Cardiology recommended holding diuresis given her pre-load dependence with acute right sided heart failure  -- Pulmonary consulted to assist with further cares, signed off now  -- Continue current nebs and pulmonary  hygiene, pt has been refusing occasionally  -- ID recommended no changes to current antibiotic plan since current respiratory failure is volume related  -- Ceftriaxone completed 09/05. ID now signed off. Will follow up CT Chest with Pulmonary if needed  ---Seen by SLP, recommend regular/thin liquids  ---Follow up in Pulmonary clinic 09/25, plan CT chest to be done prior to visit  -- Follow up with Surgery outpatient  -- Cards re-consulted 9/3 with ongoing issues with her volume status, diuresing as mentioned below  -- On supplemental o2  -- Patient has been declining slowly and has had multiple rapid responses.Given the overall gradual decline,consulted palliative to assist with goals of care discussion and code changed to DNR/DNI. Plans to transition to home hospice on discharge 9/10.      Recurrent right sided pleural effuison: CXR 9/3 shows recurrent right sided pleural effusion that pulmonary thinks is too small to tap. There is evidence of great response to recent left sided thoracentesis  -- RR called 9/4, repeat chest xray pleural fluid/thickening throughout the periphery of right hemithorax      Paroxysmal A-fib/a flutter; status post PPM  Aortic stenosis status post TAVR  Acute on chronic HFpEF with new right sided acute systolic CHF  TTE 8/31 showed preserved EF with new evidence of decreased RV systolic function  -- Has been on multiple meds like metoprolol, diltiazem and digoxin with holding parameters. Dilt and metoprolol now stopped.   -- On Eliquis.  -- Cardiology signed off x 2, re-consulted on 9/3 and is following  -- Dobutamine drip stopped on 9/6 as pt leaning more towards hospice on discharge  -- She is off amiodarone due to QTc prolongation. S/p amiodarone bolus X 1 9/4  -- Patient diuresis was challenging given her hypotension, blood pressure currently stable.  -- Started on lasix drip 9/4 and transitioned to PO lasix 9/7.   -- Chairez placed 9/4 for accurate I/O monitoring, can leave in place on  discharge for comfort cares  -- Appreciate Cardiology and EP input       Metabolic alkalosis:  Suspect primary metabolic alkalosis due to Lasix and poor oral intake  -- Off acetazolamide.     Pancytopenia  Acute on chronic anemia  --Noted Hb 6.8, s/p 1 U PRBC 9/4  --Monitor closely as pt on DOAC.  --No S/S of active bleeding, if Hb continues to drop will hold DOAC      HypoK- Replete.       Hyperlipidemia: continue statin       DM2:  Not on home hypoglycemic meds  Last A1c 6.2  -- Continue sliding scale insulin        Sacral erythema  Berna anal skin breakdown  -- Calmoseptine, WOC consulted       Epigastric pain  Denies nausea, vomting  -- continue PPI, Sucralfate, and Maalox prn      Deconditioned due to acute medical illness  -PT/OT following, will be going home with hospice on discharge likely on 9/10    Goals of care  -Palliative following. Pt DNR/DNI          Diet: Regular Diet Adult  Snacks/Supplements Adult: Glucerna; With Meals  Fluid restriction 1800 ML FLUID    DVT Prophylaxis: DOAC  Chairez Catheter: PRESENT, indication: End of life  Lines: PRESENT      PICC 08/10/24 Triple Lumen Right Brachial vein medial-Site Assessment: WDL      Cardiac Monitoring: ACTIVE order. Indication: Tachyarrhythmias, acute (48 hours)  Code Status: No CPR- Do NOT Intubate      Clinically Significant Risk Factors        # Hypokalemia: Lowest K = 3.1 mmol/L in last 2 days, will replace as needed       # Hypoalbuminemia: Lowest albumin = 2.2 g/dL at 8/18/2024  7:04 AM, will monitor as appropriate     # Hypertension: Noted on problem list                 # Financial/Environmental Concerns: none   # Pacemaker present         Disposition Plan   Medically Ready for Discharge: Anticipated in 2-4 Days      Yasmine Gifford MD  Hospitalist Service  [facility]  Securely message with [employer] (more info)  Text page via [employer] Paging/[employer]    ______________________________________________________________________    Interval History   Chart reviewed and events noted.  Patient seen and examined.   Laying in bed, not very interactive, appears comfortable. Denies any SOB. Breakfast try by bedside, poor appetite.     Physical Exam   Vital Signs: Temp: 98.9  F (37.2  C) Temp src: Axillary BP: 120/57 Pulse: 100   Resp: 20 SpO2: 91 % O2 Device: Oxymask Oxygen Delivery: 6 LPM  Weight: 128 lbs 11.98 oz    General: Elderly female in no distress  RS:Non labored breathing  Neurology: Awake, alert, oriented, moving all extremities  Psy: Calm         >50 MINUTES of critical time SPENT BY ME on the date of service doing chart review, history, exam, documentation & further activities per the note.    Plan discussed with patient, PETER/ELENI. Last updated son on 9/6.     Data

## 2024-09-08 NOTE — PROVIDER NOTIFICATION
Patient needing Oxymask overnight despite being on 1L NC most of the day yesterday. Patient 02 alarm going off at 9 this am 02 as low as 76% Patients mask was found to be knocked off 02 did not come back up on the 3L  turned 02 up to 6 L Sating 91 % will titrate down as able. Patient responds to voice but is very drowsy.

## 2024-09-08 NOTE — PROGRESS NOTES
Care Management Follow Up    Length of Stay (days): 21    Expected Discharge Date: 09/09/2024     Concerns to be Addressed: discharge planning     Patient plan of care discussed at interdisciplinary rounds: Yes    Anticipated Discharge Disposition: Hospice  Disposition Comments: [id number]           Anticipated Discharge Services: None, Transportation Services (hospice)  Anticipated Discharge DME: Oxygen, Wheelchair (hospital bed)    Patient/family educated on Medicare website which has current facility and service quality ratings: no  Education Provided on the Discharge Plan: Yes  Patient/Family in Agreement with the Plan: yes    Referrals Placed by CM/SW: Hospice  Private pay costs discussed: Not applicable    Discussed  Partnership in Safe Discharge Planning  document with patient/family: No     Handoff Completed: No, handoff not indicated or clinically appropriate    Additional Information:  Notified by provider anticipate discharge to home on Monday with [facility].    Met with pt and her son Heriberto along with other family members.  Pt and her son confirmed desire to return home with hospice care.  Per pt son he was given [facility] as a resource and has already called them.  Pt family is working on arranging caregivers to be with pt 24/7 at home.  Per pt and her son they have several family members that are CNA's/PCA's that will be supporting pt at home. Pt family is prepared to bring pt home tomorrow pending confirmation that hospice is able to accept/open patient to services.  Discussed discussed transportation.  Plan for stretcher transport at discharge.   Pt and her son noted no other concerns or questions at this time.     Initiated referral to [facility] [phone].  Shalini requested initial clinical information be faxed to [phone].  Faxed clinical information as requested.    Next Steps: Await confirmation of hospice acceptance, admission date/time.  Will need to  coordinate stretcher transport for discharge.     Sanam Ruiz, RN BSN, PHN, ACM-RN  Nurse Coordinator   Covering P3

## 2024-09-09 ENCOUNTER — TELEPHONE (OUTPATIENT)
Dept: PHARMACY | Facility: HOSPITAL | Age: 74
End: 2024-09-09

## 2024-09-09 ENCOUNTER — TELEPHONE (OUTPATIENT)
Dept: CARDIOLOGY | Facility: CLINIC | Age: 74
End: 2024-09-09

## 2024-09-09 VITALS
HEIGHT: 65 IN | DIASTOLIC BLOOD PRESSURE: 62 MMHG | BODY MASS INDEX: 21.34 KG/M2 | OXYGEN SATURATION: 96 % | HEART RATE: 95 BPM | RESPIRATION RATE: 18 BRPM | WEIGHT: 128.09 LBS | TEMPERATURE: 98.6 F | SYSTOLIC BLOOD PRESSURE: 113 MMHG

## 2024-09-09 DIAGNOSIS — I50.9 ACUTE DECOMPENSATED HEART FAILURE (H): Primary | ICD-10-CM

## 2024-09-09 LAB
ANION GAP SERPL CALCULATED.3IONS-SCNC: 9 MMOL/L (ref 7–15)
BUN SERPL-MCNC: 44.4 MG/DL (ref 8–23)
CALCIUM SERPL-MCNC: 9.2 MG/DL (ref 8.8–10.4)
CHLORIDE SERPL-SCNC: 84 MMOL/L (ref 98–107)
CREAT SERPL-MCNC: 0.8 MG/DL (ref 0.51–0.95)
EGFRCR SERPLBLD CKD-EPI 2021: 77 ML/MIN/1.73M2
GLUCOSE BLDC GLUCOMTR-MCNC: 199 MG/DL (ref 70–99)
GLUCOSE SERPL-MCNC: 131 MG/DL (ref 70–99)
HCO3 SERPL-SCNC: 42 MMOL/L (ref 22–29)
MAGNESIUM SERPL-MCNC: 2.4 MG/DL (ref 1.7–2.3)
POTASSIUM SERPL-SCNC: 3.4 MMOL/L (ref 3.4–5.3)
SODIUM SERPL-SCNC: 135 MMOL/L (ref 135–145)

## 2024-09-09 PROCEDURE — 99239 HOSP IP/OBS DSCHRG MGMT >30: CPT | Performed by: HOSPITALIST

## 2024-09-09 PROCEDURE — 250N000013 HC RX MED GY IP 250 OP 250 PS 637: Performed by: HOSPITALIST

## 2024-09-09 PROCEDURE — 250N000013 HC RX MED GY IP 250 OP 250 PS 637: Performed by: INTERNAL MEDICINE

## 2024-09-09 PROCEDURE — 99232 SBSQ HOSP IP/OBS MODERATE 35: CPT | Performed by: NURSE PRACTITIONER

## 2024-09-09 PROCEDURE — 250N000013 HC RX MED GY IP 250 OP 250 PS 637: Performed by: SPECIALIST

## 2024-09-09 PROCEDURE — 250N000009 HC RX 250: Performed by: INTERNAL MEDICINE

## 2024-09-09 PROCEDURE — 94640 AIRWAY INHALATION TREATMENT: CPT

## 2024-09-09 PROCEDURE — 999N000157 HC STATISTIC RCP TIME EA 10 MIN

## 2024-09-09 PROCEDURE — 80048 BASIC METABOLIC PNL TOTAL CA: CPT | Performed by: SPECIALIST

## 2024-09-09 PROCEDURE — 94640 AIRWAY INHALATION TREATMENT: CPT | Mod: 76

## 2024-09-09 PROCEDURE — 250N000009 HC RX 250

## 2024-09-09 PROCEDURE — 250N000013 HC RX MED GY IP 250 OP 250 PS 637: Performed by: STUDENT IN AN ORGANIZED HEALTH CARE EDUCATION/TRAINING PROGRAM

## 2024-09-09 PROCEDURE — 83735 ASSAY OF MAGNESIUM: CPT | Performed by: HOSPITALIST

## 2024-09-09 RX ORDER — DIGOXIN 125 MCG
125 TABLET ORAL DAILY
Qty: 30 TABLET | Refills: 0 | Status: SHIPPED | OUTPATIENT
Start: 2024-09-09 | End: 2024-10-09

## 2024-09-09 RX ORDER — LORAZEPAM 2 MG/ML
.25-.5 CONCENTRATE ORAL EVERY 4 HOURS PRN
Qty: 4.5 ML | Refills: 0 | Status: SHIPPED | OUTPATIENT
Start: 2024-09-09 | End: 2024-09-12

## 2024-09-09 RX ORDER — MORPHINE SULFATE 100 MG/5ML
2.5-5 SOLUTION ORAL
Qty: 9 ML | Refills: 0 | Status: SHIPPED | OUTPATIENT
Start: 2024-09-09 | End: 2024-09-12

## 2024-09-09 RX ORDER — POTASSIUM CHLORIDE 1.5 G/1.58G
40 POWDER, FOR SOLUTION ORAL ONCE
Status: COMPLETED | OUTPATIENT
Start: 2024-09-09 | End: 2024-09-09

## 2024-09-09 RX ADMIN — SODIUM CHLORIDE SOLN NEBU 3% 3 ML: 3 NEBU SOLN at 08:48

## 2024-09-09 RX ADMIN — SUCRALFATE 1 G: 1 TABLET ORAL at 06:04

## 2024-09-09 RX ADMIN — IPRATROPIUM BROMIDE 0.5 MG: 0.5 SOLUTION RESPIRATORY (INHALATION) at 08:48

## 2024-09-09 RX ADMIN — IPRATROPIUM BROMIDE 0.5 MG: 0.5 SOLUTION RESPIRATORY (INHALATION) at 11:57

## 2024-09-09 RX ADMIN — LEVALBUTEROL HYDROCHLORIDE 1.25 MG: 1.25 SOLUTION RESPIRATORY (INHALATION) at 08:48

## 2024-09-09 RX ADMIN — FLUTICASONE FUROATE AND VILANTEROL TRIFENATATE 1 PUFF: 200; 25 POWDER RESPIRATORY (INHALATION) at 09:25

## 2024-09-09 RX ADMIN — SODIUM CHLORIDE SOLN NEBU 3% 3 ML: 3 NEBU SOLN at 11:56

## 2024-09-09 RX ADMIN — DIGOXIN 125 MCG: 125 TABLET ORAL at 09:21

## 2024-09-09 RX ADMIN — APIXABAN 5 MG: 5 TABLET, FILM COATED ORAL at 09:21

## 2024-09-09 RX ADMIN — ACETAMINOPHEN 650 MG: 325 TABLET, FILM COATED ORAL at 12:40

## 2024-09-09 RX ADMIN — PANTOPRAZOLE SODIUM 40 MG: 40 TABLET, DELAYED RELEASE ORAL at 06:05

## 2024-09-09 RX ADMIN — POTASSIUM CHLORIDE 40 MEQ: 1.5 POWDER, FOR SOLUTION ORAL at 06:04

## 2024-09-09 RX ADMIN — Medication 1 SPRAY: at 09:26

## 2024-09-09 RX ADMIN — GABAPENTIN 300 MG: 300 CAPSULE ORAL at 09:22

## 2024-09-09 RX ADMIN — SENNOSIDES AND DOCUSATE SODIUM 1 TABLET: 8.6; 5 TABLET ORAL at 09:21

## 2024-09-09 RX ADMIN — FUROSEMIDE 40 MG: 40 TABLET ORAL at 09:22

## 2024-09-09 RX ADMIN — LEVALBUTEROL HYDROCHLORIDE 1.25 MG: 1.25 SOLUTION RESPIRATORY (INHALATION) at 11:57

## 2024-09-09 ASSESSMENT — ACTIVITIES OF DAILY LIVING (ADL)
ADLS_ACUITY_SCORE: 51

## 2024-09-09 NOTE — PROGRESS NOTES
Care Management Discharge Note    Discharge Date: 09/09/2024       Discharge Disposition: Home, Hospice    Discharge Services: None    Discharge DME:  (per hospice team)    Discharge Transportation:  (Medical - Stretcher Transport)    Private pay costs discussed: transportation costs    Does the patient's insurance plan have a 3 day qualifying hospital stay waiver?  No    PAS Confirmation Code: NA  Patient/family educated on Medicare website which has current facility and service quality ratings: no    Education Provided on the Discharge Plan: Yes  Persons Notified of Discharge Plans: sonHeriberto  Patient/Family in Agreement with the Plan: yes    Handoff Referral Completed: yes    Additional Information:    8:30 AM  PETER reviewed notes indicating pt/family goal to discharge home with hospice today. PETER spoke with Shayy at [facility] (ph: [phone]) who is reaching out to pt's son to discuss necessary DME prior to discharge.     9:17 AM  PETER spoke with Shayy who reports DME was ordered for delivery to pt's home and hospice RN will meet patient / family at home around 1400. [facility] stretcher transport arranged for  between [id number]. PCS completed. PETER called and spoke with pt's son, Heriberto, to discuss discharge plan. Heriberto confirms agreement with plan and denies any questions or concerns at this time.       LUZ Hernandez on 09/09/24

## 2024-09-09 NOTE — PROGRESS NOTES
PALLIATIVE CARE PROGRESS NOTE  [facility]     Patient Name: Mary Kay Tejada  Date of Admission: 8/17/2024   Today the patient was seen for: support, continued goals of care discussion.     Recommendations & Counseling       GOALS OF CARE:   life prolonging with limits (DNR/DNI), patient and family wish to pivot to comfort at discharge today w/ hospice .   If further clinical decline and life expectancy contracts to hours-short days, would want comfort measures only in hospital (could pivot to GIP if family wishing to pursue).    ADVANCE CARE PLANNING:  No health care directive on file. Per system policy, Surrogate Decision-makers for Patients With Diminished Decision-making Capacity offers guidance on possible decision-makers. Son Heriberto has been identified as a surrogate decision maker. Had completed HCD in the past but it was considered invalid as the notary was also a member of her household.  POLST form completed today.  Code status:  DNR/DNI    MEDICAL MANAGEMENT:   We are not actively managing symptoms at this time.  Palliative was not consulted for symptom management, but routine assessment completed and reveals the following:      Pain, chronic, neuropathic (favor diabetic neuropathy), musculoskeletal  - on oxycodone 5-10mg q4H prn severe pain at home.  Not requiring here.  - was on gabapentin 600mg TID (may have been contributing to somnolence now as creatinine is rising--agree w 300 TID, if further sleepiness continue to taper the medication.   -Per family refusing anything but tylenol, we discussed possibility of premedication prior to ride if Mary Kay is open to it.      Delirium, hypoactive  - has had mild episodes at home, no history of cognitive impairment or dementia.  - avoid anticholinergics, antihistamines and benzodiazepines unless necessary.  - encourage family presence, optimize sleep and attend to elimination.     Cachexia, favor pulmonary, potentially combined  with cardiac.  - diet as tolerated (minimal intake past several days, poor intake past several months.    - portends poor prognosis.    PSYCHOSOCIAL/SPIRITUAL:  Family son Heriberto, daughter in law Cynthai; daughter Shalini  nine years ago.  Friends   [place] community: [place]     Thank you for the consult and allowing us to aid in the care of Mary Kay Tejada.      RAUDEL Craven CNP  [facility], Palliative Care  Securely message with the [employer] Web Console (learn more here) or  Text page via [facility] Paging/Directory        Assessment            Mary Kay Tejada is a 74 year old female with a past medical history of HTN, CAD, HFpEF, atrial fibrillation on chronic anticoagulation, aortic stenosis (s/p TAVR), PPM, COPD, tobacco abuse, history recent COVID in July, and combined hypercapnic/hypoxic respiratory failure after July hospital stay.  She also has history of chronic pain.    Mary Kay  presented on 24 to [facility] after having been admitted to  8/10/24 with dyspnea and found to have large R pleural effusion, empyema dx and chest tube was placed and bronchoscopy performed, lytic treatment for loculated strep pneumonia large effusion, and ultimately transferred 24 to [facility] for surgical intervention with VATS and decortication 24.  Today is hospital day #19; she has struggled with decompensated cardiomyopathy (now on dobutamine gtt + furosemide gtt), atrial fib/flutter w RVR, RV failure (new) with hypotension limiting tolerance of diuresis.    Pulmonary medicine has indicated no further treatments of the R sided effusion are advised other than diuresis.  She is s/p VATS, decortication, and no further interventions are recommended with declining functional status.  She has been unable to get out of bed or reposition herself without support.  Plan for discharge today to home with hospice.      Interval History:     Multidisciplinary collaboration:  Chart reviewed, notes from  "cardiology, nursing, RT, care managment, Medicine  reviewed.    Patient/family narrative  Betsy notes looking forward to being at home.  She is with her sister and 2 other relatives this AM.  Was able to voice a preference regarding CPR for DNR/DNI today when we reviewed POLST, previously discussed with her and her son per my colleague. Reviewed with family present.   Denies discomfort, takes off oxygen mask for \"a break.\"  Spoke with Cynthia via phone who is preparing for Hospice at home. Reviewed plan and POLST.      Physical Exam:   Temp:  [98.5  F (36.9  C)-99.5  F (37.5  C)] 98.6  F (37  C)  Pulse:  [] 92  Resp:  [12-18] 18  BP: (101-119)/(55-61) 115/59  SpO2:  [90 %-99 %] 99 %  128 lbs 1.4 oz    Vitals:    09/05/24 0324 09/06/24 0330 09/07/24 0359 09/08/24 0430   Weight: 63.9 kg (140 lb 14 oz) 60.1 kg (132 lb 7.9 oz) 59.1 kg (130 lb 4.7 oz) 58.4 kg (128 lb 12 oz)    09/09/24 0437   Weight: 58.1 kg (128 lb 1.4 oz)       Intake/Output Summary (Last 24 hours) at 9/6/2024 1243  Last data filed at 9/6/2024 1100  Gross per 24 hour   Intake 808.9 ml   Output 3775 ml   Net -2966.1 ml       Physical Exam   alert, oriented x3.  Speech fluent.  Edentulous.  Breathing nonlabored. O2 per mask.  Extremities are without edema. KONG  Affect full.        Data Reviewed:     Lab Results   Component Value Date    WBC 1.4 (L) 09/08/2024    WBC 1.8 (L) 09/05/2024    WBC 1.6 (L) 09/04/2024    HGB 8.7 (L) 09/08/2024    HGB 8.4 (L) 09/05/2024    HGB 6.8 (LL) 09/04/2024    HCT 30.1 (L) 09/08/2024    HCT 28.7 (L) 09/05/2024    HCT 24.4 (L) 09/04/2024     09/08/2024     (L) 09/05/2024     (L) 09/04/2024     09/09/2024     09/08/2024     09/07/2024    POTASSIUM 3.4 09/09/2024    POTASSIUM 3.5 09/08/2024    POTASSIUM 3.4 09/08/2024    CHLORIDE 84 (L) 09/09/2024    CHLORIDE 83 (L) 09/08/2024    CHLORIDE 83 (L) 09/07/2024    CO2 42 (H) 09/09/2024    CO2 42 (H) 09/08/2024    CO2 43 (H) 09/07/2024    " BUN 44.4 (H) 09/09/2024    BUN 41.6 (H) 09/08/2024    BUN 34.2 (H) 09/07/2024    CR 0.80 09/09/2024    CR 0.95 09/08/2024    CR 0.99 (H) 09/07/2024     (H) 09/09/2024     (H) 09/09/2024     (H) 09/08/2024    NTBNPI 2,953 (H) 09/03/2024    NTBNPI 2,611 (H) 08/29/2024    NTBNPI 6,274 (H) 08/12/2024    NTBNP 641 02/19/2024    AST 20 08/18/2024    AST 26 08/14/2024    AST 54 (H) 08/13/2024    ALT 31 08/18/2024     (H) 08/14/2024     (H) 08/13/2024    ALKPHOS 105 08/18/2024    ALKPHOS 154 (H) 08/14/2024    ALKPHOS 171 (H) 08/13/2024    BILITOTAL 0.4 08/18/2024    BILITOTAL 0.3 08/14/2024    BILITOTAL 0.3 08/13/2024    INR 1.81 (H) 08/31/2024    INR 1.27 (H) 07/13/2024    INR 1.45 (H) 07/12/2024      35 minutes spent on the date of the encounter doing chart review, history and exam, documentation and further activities per the note.    RAUDEL Craven CNP  [facility], Palliative Care  Securely message with the [employer] Web Console (learn more here) or  Text page via [facility] Paging/Directory

## 2024-09-09 NOTE — PLAN OF CARE
Problem: Adult Inpatient Plan of Care  Goal: Absence of Hospital-Acquired Illness or Injury  Intervention: Prevent Skin Injury  Recent Flowsheet Documentation  Taken 9/8/2024 2105 by Jamie Schaefer RN  Body Position:   turned   left   heels elevated  Device Skin Pressure Protection: absorbent pad utilized/changed     Problem: Risk for Delirium  Goal: Improved Sleep  Outcome: Progressing     Problem: Respiratory Compromise (Pneumothorax)  Goal: Optimal Oxygenation and Ventilation  Outcome: Progressing   Goal Outcome Evaluation:                  A&Ox3-4 w/intermittent confusion, very lethargic during shift but arouse's to voice. 2 Liters N.C during the day, switched over to 4 liter oxymask due to sats in the 80s, now on 3 Liters oxymask. Denies pain but grimaces while being repositioned. Lidocaine applied to R shoulder. Foam applied to bottom of heels due to redness. Repo frequently. Sisters present at bedside overnight.

## 2024-09-09 NOTE — TREATMENT PLAN
RCAT Treatment Plan    Patient Score: 14  Patient Acuity: 3      Clinical Indication for Therapy: COPD    Therapy Ordered: QID: Xopenex/Atrovent/NaCl. Aerobika QID.     Assessment Summary: Per MD Mucomyst  to be discontinued. Patient continues with diminisehd breath sounds and unable to effectively cough and clear secretions. RT will follow.    Esdras Villafana, RT  9/8/2024

## 2024-09-09 NOTE — PROGRESS NOTES
Patient discharged to home with [facility] services. Sisters here and went over discharge paperwork. Morphine and Lorazepam prescriptions sent with EMS workers. Patient has all belongings. Left via EMS stretcher. PICC line removed. Chairez remains in place per MD fiore to end of life care.

## 2024-09-09 NOTE — PLAN OF CARE
Cardiology team will sign-off for now. Please do not hesitate to consult us again if new questions or concerns arise.       Thom Robert MD., S

## 2024-09-09 NOTE — PLAN OF CARE
Physical Therapy Discharge Summary    Reason for therapy discharge:    Discharged to home with hospice.  Family is working on setting up 24/7 assist.    Progress towards therapy goal(s). [facility] for goal details.  Goals not met.  Barriers to achieving goals:   limited tolerance for therapy.    Therapy recommendation(s):    No further therapy is recommended.

## 2024-09-09 NOTE — PLAN OF CARE
Problem: Adult Inpatient Plan of Care  Goal: Optimal Comfort and Wellbeing  Outcome: Progressing  Intervention: Monitor Pain and Promote Comfort  Recent Flowsheet Documentation  Taken 9/9/2024 0845 by Sumaya Baker RN  Pain Management Interventions:   emotional support   pillow support provided  Intervention: Provide Person-Centered Care  Recent Flowsheet Documentation  Taken 9/9/2024 0845 by Sumaya Baker RN  Trust Relationship/Rapport:   care explained   questions answered   questions encouraged   reassurance provided   thoughts/feelings acknowledged   emotional support provided   empathic listening provided   choices provided     Problem: Respiratory Compromise (Pneumothorax)  Goal: Optimal Oxygenation and Ventilation  Outcome: Progressing     Problem: Palliative Care  Goal: Enhanced Quality of Life  Outcome: Progressing  Intervention: Maximize Comfort  Recent Flowsheet Documentation  Taken 9/9/2024 0845 by Sumaya Baker RN  Pain Management Interventions:   emotional support   pillow support provided  Intervention: Optimize Psychosocial Wellbeing  Recent Flowsheet Documentation  Taken 9/9/2024 0845 by Sumaya Baker RN  Grieving Process Facilitation: family/significant other support facilitated  Supportive Measures:   active listening utilized   decision-making supported   positive reinforcement provided  Family/Support System Care:   presence promoted   involvement promoted   support provided   Goal Outcome Evaluation:       Patient transferring to [facility] today between 12:30 and 1:30pm. Sisters here and updated on plan of care. PICC line will be removed and Chairez will remain in place for end of life comfort.

## 2024-09-09 NOTE — PLAN OF CARE
Occupational Therapy Discharge Summary    Reason for therapy discharge:    Discharged to home with hospice services.  Family working on setting up 24/7 assist.    Progress towards therapy goal(s). See goals on Care Plan in [facility] electronic health record for goal details.  Goals not met.  Barriers to achieving goals:   limited tolerance for therapy.    Therapy recommendation(s):    No further therapy is recommended.

## 2024-09-09 NOTE — DISCHARGE SUMMARY
[facility] MEDICINE  DISCHARGE SUMMARY     Primary Care Physician: Cammy Bui  Admission Date: 8/17/2024   Discharge Provider: Yasmine Gifford MD Discharge Date: 9/9/2024   Diet:   Active Diet and Nourishment Order   Procedures    Snacks/Supplements Adult: Glucerna; With Meals    Regular Diet Adult    Diet       Code Status: No CPR- Do NOT Intubate   Activity: DCACTIVITY: Activity as tolerated        Condition at Discharge: Guarded     REASON FOR PRESENTATION(See Admission Note for Details)     CHF,Empyema    PRINCIPAL & ACTIVE DISCHARGE DIAGNOSES     Active Problems:    Empyema (H)      PENDING LABS     Unresulted Labs Ordered in the Past 30 Days of this Admission       Date and Time Order Name Status Description    8/11/2024  9:43 AM Acid-Fast Bacilli Culture and Stain In process     8/11/2024  9:43 AM Acid-Fast Bacilli Culture and Stain In process     8/10/2024 12:04 PM Acid-Fast Bacilli Culture and Stain In process               PROCEDURES ( this hospitalization only)      Procedure(s):  BRONCHOSCOPY    RECOMMENDATIONS TO OUTPATIENT PROVIDER FOR F/U VISIT     Follow-up Appointments     Follow-up and recommended labs and tests       Follow up with Hospice team at home              DISPOSITION     Home with hospice on discharge    SUMMARY OF HOSPITAL COURSE:      74-year-old female with history of a flutter, COPD, ongoing tobacco abuse, previous pleural effusions and chronic pain admitted 8/17/24 with empyema and transferred from [facility] to [facility] for surgical management.  Patient initially presented to [facility] on 8/10 with large right-sided effusion and found to have right-sided empyema with mucous plugging with strep pneumonia and E. coli pneumonia.  This does not drain well with chest tube and intrapleural lytics which is why patient was transferred to [facility] and underwent right VATS with chest tube placement on 8/19.  No  decortication was done.  Patient was supposed to undergo bronchoscopy on 8/21 but was not done due to high O2 requirement.  Chest tube was removed on 8/25 subsequently.  Patient has had persistent loculated and complex appearing right effusion that has not changed.  Patient also had left-sided pleural effusion and 0.8 L was drained on 8/31. Patient was treated with antibiotics per ID recommendation.     Her stay here is complicated by development of new right-sided heart failure, persistent atrial flutter/fibrillation and anemia.  It has been challenging to diurese the patient given her ongoing hypotension.  Appreciate cardiology and EP input. Patient has been on intermittent diuresis and than was on Lasix drip as of 9/4-->transitioned to PO 9/7. Discussed with patient's son on 9/4 about the gradual decline of patient's clinical condition and palliative was consulted to assist with goals of care discussion. Code status changed to DNR/DNI with plans to discharging home today with home hospice     Empyema  Acute hypoxic respiratory failure  H/O recurrent right-sided pleural effusion/empyema status post bronchoscopy on 8/11/2024 with cultures that grew E. coli and Candida albicans.  Pleural effusion cultures growing strep pneumo  s/p VATS 8/19/24. No true emphyema or rind found, no ability to expand lung  Repeat CT 08/20 showed right pleural effusion s/p chest tube placement, moderate likely loculated/complicated residual as well as small associated right pneumothorax, reexpansion of right upper lobe with persistent collapse of right middle and lower lobe segments.  Significant retained secretions greater in the right lower lobe and increasing left pleural effusion  Plan was for bronchoscopy 08/21, not done as patient was requiring upto 10L oxygen and had high risk of being intubated  Chest tube removed 08/25   Acute hypoxemic respiratory failure worse 8/29-8/31 and likely related to eventual findings of moderate left  pleural effusion and acute right sided systolic CHF   8/29: patient with complaints of increased SOB, productive cough and weakness. CXR 8/29 shows Increased opacification in the left lower lobe of concern for worsening pneumonia. Of note patient had remained afebrile and WBC remains normal on IV ceftriaxone for pneumococcus and E coli . Yeast on BAL thought to be colonization  Discussed with ID 8/29 and they recommend no changes  Overnight 8/29-8/30 patient with worsening hypoxemic respiratory failure.   CXR 8/30 with evidence of worsening pulmonary edema and possibly improved LLL opacity.  BNP elevated but improved from previous  CT chest 8/31 showed moderate left pleural effusion and stable changes on right post surgery  S/p left sided thoracentesis 8/31 with 0.8L of clear fluid removed and consistent with transudate  Consulted cardiology regarding difficult diuresis  TTE performed 8/31 and showed preserved LVEF with new evidence of decreased RV systolic function  --Patient was being followed by pulmonology and cardiology closely.  --Completed antibiotics per ID recommendation.  On diet per speech recommendation.  Patient had plans to follow-up with pulmonology clinic on 9/25 and with surgery as outpatient, however is transitioning to home hospice on discharge.         Recurrent right sided pleural effuison: CXR 9/3 shows recurrent right sided pleural effusion that pulmonary thinks is too small to tap. There is evidence of great response to recent left sided thoracentesis  -- RR called 9/4, repeat chest xray pleural fluid/thickening throughout the periphery of right hemithorax        Paroxysmal A-fib/a flutter; status post PPM  Aortic stenosis status post TAVR  Acute on chronic HFpEF with new right sided acute systolic CHF  TTE 8/31 showed preserved EF with new evidence of decreased RV systolic function  --Has been on multiple meds like metoprolol, diltiazem and digoxin with holding parameters. Dilt and  metoprolol now stopped.   --Continue digoxin, Eliquis.  Patient off amiodarone due to QTc prolongation.  --Diuresing patient was challenging given hypotension, is being discharged on Lasix oral on discharge.  --Patient did want to stay on these medications as per my discussion with her at the time of discharge  --Patient had a Chairez placed here for accurate output monitoring, will leave in place for comfort care reasons on discharge.        Metabolic alkalosis:  Suspect primary metabolic alkalosis due to Lasix and poor oral intake  -- Off acetazolamide.      Pancytopenia  Acute on chronic anemia  --Noted Hb 6.8, s/p 1 U PRBC 9/4  --Monitor closely as pt on DOAC.  --No S/S of active bleeding        HypoK- Replete.         Hyperlipidemia:Stopped statin as she transitioned to hospice on discharge        DM2:  Not on home hypoglycemic meds  Last A1c 6.2, stop insulin on discharge        Sacral erythema  Berna anal skin breakdown  -- Calmoseptine, WOC consulted        Epigastric pain  Denies nausea, vomiting  -Stopped all meds on discharge.          Deconditioned due to acute medical illness  -PT/OT following, will be going home with hospice today     Goals of care  -Palliative following. Pt DNR/DNI    .Patient stable to be discharged home with hospice today. Please refer to discharge medications and instructions for more details.            Discharge Medications with Med changes:     Current Discharge Medication List        START taking these medications    Details   digoxin (LANOXIN) 125 MCG tablet Take 1 tablet (125 mcg) by mouth daily.  Qty: 30 tablet, Refills: 0    Associated Diagnoses: Atrial fibrillation with rapid ventricular response (H)      LORazepam (ATIVAN) 2 MG/ML (HIGH CONC) oral solution Take 0.125-0.25 mLs (0.25-0.5 mg) by mouth or place under tongue every 4 hours as needed for anxiety (restlessness).  Qty: 4.5 mL, Refills: 0    Associated Diagnoses: End of life care      morphine sulfate, high  "concentrate, (ROXANOL-CONCENTRATED) 20 MG/ML concentrated solution Take 0.125-0.25 mLs (2.5-5 mg) by mouth or place under tongue every 2 hours as needed for shortness of breath or pain.  Qty: 9 mL, Refills: 0    Comments: Hospice patient. Dispense in quantities of 30 mL.  Associated Diagnoses: End of life care           CONTINUE these medications which have NOT CHANGED    Details   ACCU-CHEK SOFTCLIX LANCETS lancets [ACCU-CHEK SOFTCLIX LANCETS LANCETS] TEST THREE TIMES DAILY  Qty: 300 each, Refills: 3    Associated Diagnoses: Type 2 diabetes mellitus with hyperglycemia (H)      albuterol (PROAIR HFA/PROVENTIL HFA/VENTOLIN HFA) 108 (90 Base) MCG/ACT inhaler INHALE 2 PUFFS INTO THE LUNGS EVERY 4 HOURS AS NEEDED FOR WHEEZING  Qty: 13.4 g, Refills: 1    Comments: Pharmacy may dispense brand covered by insurance (Proair, or proventil or ventolin or generic albuterol inhaler)  Associated Diagnoses: Chronic obstructive pulmonary disease with acute lower respiratory infection (H)      apixaban ANTICOAGULANT (ELIQUIS) 5 MG tablet Take 1 tablet (5 mg) by mouth 2 times daily  Qty: 180 tablet, Refills: 2    Comments: Resume evening of 2/23  Associated Diagnoses: Atrial fibrillation, unspecified type (H)      BD ULTRA-FINE SHORT PEN NEEDLE 31 gauge x 5/16\" Ndle [BD ULTRA-FINE SHORT PEN NEEDLE 31 GAUGE X 5/16\" NDLE] TEST FOUR TIMES DAILY WITH MEALS AND AT BEDTIME  Qty: 400 each, Refills: 3    Associated Diagnoses: DM (diabetes mellitus) (H)      !! blood glucose (ONETOUCH VERIO IQ) test strip TEST THREE TIMES DAILY  Qty: 300 strip, Refills: 1    Associated Diagnoses: Type 2 diabetes mellitus with hypoglycemia without coma, without long-term current use of insulin (H)      !! blood-glucose meter (ONETOUCH VERIO IQ METER) Misc [BLOOD-GLUCOSE METER (ONETOUCH VERIO IQ METER) MISC] Check blood sugar three times a day.  Qty: 1 each, Refills: 0    Associated Diagnoses: Type 2 diabetes mellitus with hypoglycemia without coma, without " long-term current use of insulin (H)      budesonide-formoterol (SYMBICORT) 160-4.5 MCG/ACT Inhaler INHALE 2 PUFFS INTO THE LUNGS TWICE DAILY  Qty: 10.2 g, Refills: 4    Associated Diagnoses: Pulmonary emphysema, unspecified emphysema type (H)      diaper,brief,adult,disposable (ADULT BRIEFS - LARGE) Misc [DIAPER,BRIEF,ADULT,DISPOSABLE (ADULT BRIEFS - LARGE) MISC] Use 3-4 daily as needed for incontinence  Qty: 120 each, Refills: 6    Associated Diagnoses: Mixed stress and urge urinary incontinence      Emollient (EUCERIN ORIGINAL HEALING) LOTN APPLY LIBERALLY TO DRY SKIN TWICE DAILY AS NEEDED      furosemide (LASIX) 20 MG tablet Take 2 tablets (40 mg) by mouth daily  Qty: 180 tablet, Refills: 1    Comments: Dose increased 4/29  Associated Diagnoses: Acute on chronic diastolic heart failure (H)      gabapentin (NEURONTIN) 600 MG tablet TAKE 1 TABLET(600 MG) BY MOUTH THREE TIMES DAILY  Qty: 270 tablet, Refills: 2    Associated Diagnoses: Type 2 diabetes mellitus with hypoglycemia without coma, with long-term current use of insulin (H)      ipratropium - albuterol 0.5 mg/2.5 mg/3 mL (DUONEB) 0.5-2.5 (3) MG/3ML neb solution INHALE 1 VIAL VIA NEBULIZER EVERY 6 HOURS AS NEEDED FOR SHORTNESS OF BREATH OR WHEEZING  Qty: 90 mL, Refills: 0    Associated Diagnoses: COPD exacerbation (H)      Nutritional Supplements (ENSURE COMPLETE) LIQD Take 1 Can by mouth daily  Qty: 7110 mL, Refills: 11    Associated Diagnoses: Severe protein-calorie malnutrition (H24)      nystatin (NYSTOP) 017450 UNIT/GM external powder APPLY TO THE AFFECTED AREA(S) 2-3 TIMES DAILY AS NEEDED  Qty: 180 g, Refills: 0    Comments: **Patient requests 90 days supply**  Associated Diagnoses: Candidal intertrigo      polyethylene glycol (MIRALAX) 17 GM/Dose powder Take 17 g by mouth daily as needed for constipation    Associated Diagnoses: Constipation, unspecified constipation type      sennosides (SENOKOT) 8.6 MG tablet Take 1 tablet by mouth 2 times daily     Associated Diagnoses: Constipation, unspecified constipation type      sucralfate (CARAFATE) 1 GM tablet CRUSH ONE TABLET AND MIX WITH A LLITTLE WATER AND SWALLOW FOUR TIMES DAILY  Qty: 360 tablet, Refills: 3    Associated Diagnoses: Iron deficiency anemia due to chronic blood loss       !! - Potential duplicate medications found. Please discuss with provider.        STOP taking these medications       atorvastatin (LIPITOR) 20 MG tablet Comments:   Reason for Stopping:         diltiazem ER COATED BEADS (CARDIZEM CD/CARTIA XT) 120 MG 24 hr capsule Comments:   Reason for Stopping:         empagliflozin (JARDIANCE) 10 MG TABS tablet Comments:   Reason for Stopping:         generic lancets (FINGERSTIX LANCETS) Comments:   Reason for Stopping:         metoprolol tartrate (LOPRESSOR) 50 MG tablet Comments:   Reason for Stopping:         naloxone (NARCAN) 4 MG/0.1ML nasal spray Comments:   Reason for Stopping:         omeprazole (PRILOSEC) 40 MG DR capsule Comments:   Reason for Stopping:         oxyCODONE IR (ROXICODONE) 10 MG tablet Comments:   Reason for Stopping:         potassium chloride john ER (KLOR-CON M20) 20 MEQ CR tablet Comments:   Reason for Stopping:                     Rationale for medication changes:      See med rec        Consults       PULMONARY IP CONSULT  SURGERY GENERAL IP CONSULT  CARE MANAGEMENT / SOCIAL WORK IP CONSULT  SPEECH LANGUAGE PATH ADULT IP CONSULT  INFECTIOUS DISEASES IP CONSULT  PHYSICAL THERAPY ADULT IP CONSULT  OCCUPATIONAL THERAPY ADULT IP CONSULT  SPIRITUAL HEALTH SERVICES IP CONSULT  WOUND OSTOMY CONTINENCE NURSE  IP CONSULT  PULMONARY IP CONSULT  CARDIOLOGY IP CONSULT  WOUND OSTOMY CONTINENCE NURSE  IP CONSULT  CARDIOLOGY IP CONSULT  ELECTROPHYSIOLOGY IP CONSULT  PALLIATIVE CARE ADULT IP CONSULT  PHYSICAL THERAPY ADULT IP CONSULT  OCCUPATIONAL THERAPY ADULT IP CONSULT  CARE MANAGEMENT / SOCIAL WORK IP CONSULT    Immunizations given this encounter     Most Recent Immunizations  "  Administered Date(s) Administered    COVID-19 Bivalent 12+ (Pfizer) 09/27/2022    COVID-19 MONOVALENT 12+ (Pfizer) 11/22/2021    COVID-19 Monovalent 12+ (Pfizer 2022) 07/25/2022    DT (PEDS <7y) 03/01/2004    Flu, Unspecified 10/22/2008    HepA, Unspecified 08/03/2010    Influenza (High Dose) Trivalent,PF (Fluzone) 01/17/2020    Influenza Vaccine 65+ (Fluzone HD) 10/23/2023    Influenza Vaccine, 6+MO IM (QUADRIVALENT W/PRESERVATIVES) 09/19/2014    Pneumo Conj 13-V (2010&after) 07/22/2015    Pneumococcal 20 valent Conjugate (Prevnar 20) 12/27/2022    Pneumococcal 23 valent 10/19/2016    TD,PF 7+ (Tenivac) 08/16/2019    TDAP (Adacel,Boostrix) 03/11/2008    Td,adult,historic,unspecified 03/11/2008    Zoster recombinant adjuvanted (SHINGRIX) 08/16/2019    Zoster vaccine, live 07/22/2015           Anticoagulation Information      Recent INR results: No results for input(s): \"INR\" in the last 168 hours.  Warfarin doses (if applicable) or name of other anticoagulant: PTA eliquis      SIGNIFICANT IMAGING FINDINGS     Results for orders placed or performed during the hospital encounter of 08/17/24   XR Chest Port 1 View    Impression    IMPRESSION: No change since yesterday. Small caliber chest tube projected at the right lung base unchanged. Complete opacification of the right hemithorax with volume loss unchanged. Right PICC line tip in low SVC. Transvenous pacemaker. Hyperinflation   left lung.   XR Chest Port 1 View    Impression    IMPRESSION: Stable pacemaker, TAVR, right chest tube, right PICC. Minimal change in near complete opacification of the right hemithorax with volume loss.   XR Chest Port 1 View    Impression    IMPRESSION: Postthoracotomy changes with large bore right-sided chest tube. Removal of the right basilar pigtail chest tube. Dual-lead left subclavian venous pacer, TAVR and right upper extremity PICC line catheter remain in good position.    There has been only partial reexpansion of the right upper " lobe and apparently non of the right lower lobe. Likely small pneumothorax outlining collapsed lung in the right lateral costophrenic angle. Small right apical pleural cap persists. Volume loss   with marked shift of the mediastinum into the right chest has only slightly decreased. Minimal atelectasis in the left base behind the heart with trace effusion again noted. No signs of pneumonia or failure.   CT Chest w/o Contrast    Impression    IMPRESSION:   1.  Decreased right pleural effusion status post chest tube placement, with moderate, likely loculated/complicated residual, as well as small associated right pneumothorax now evident.  2.  Reexpansion of the right upper lobe with persistent collapse of right middle and lower lobe segments. Significant retained secretions, greatest in the right lower lobe.  3.  Increasing left pleural effusion.  4.  Additional findings as above.     XR Chest Port 1 View    Impression    IMPRESSION: Postthoracotomy changes on the right with large bore chest tube. Right upper extremity PICC line catheter in good position. Dual-lead left subclavian venous pacer. TAVR.    Shallower inspiration., Likely little change in the small, loculated hydropneumothorax in the right. Small right apical cap appears slightly decreased from before.    Increased atelectasis in the left base.   XR Chest Port 1 View    Impression    IMPRESSION: Postthoracotomy changes on the right with large bore chest tube. Right upper extremity PICC line catheter in good position. Dual-lead left subclavian venous pacer. TAVR.  Shallower inspiration., Likely little change in the small, loculated   hydropneumothorax in the right. Small right apical cap appears unchanged. Stable left pleural effusion with infiltrate or atelectasis in the left base.     XR Chest Port 1 View    Impression    IMPRESSION: Right apical chest tube remains in position. Right PICC tip in the mid SVC. Post endovascular repair of the aortic valve.  Left subclavian pacemaker unchanged. Improved aeration of the right lung. No pneumothorax visible. Small bilateral   pleural effusions and atelectasis/infiltrate of both lower lungs. Interstitial prominence consistent with mild edema. Stable cardiomegaly.   XR Chest Port 1 View    Impression    IMPRESSION: Right PICC line tip in the SVC. Right chest tube with tip projecting over the right upper lung. No pneumothorax. Endovascular aortic valve replacement. Left infraclavicular pacemaker with lead tips projecting over the right atrium and right   ventricle. Heart size upper limits of normal. Mild to moderate pulmonary vascular congestion with bilateral pleural effusions and associated compressive atelectasis. Degenerative changes bilateral shoulders, greater on the right.     XR Chest Port 1 View    Impression    IMPRESSION: Prior seen right chest tube has been removed with no evidence for a pneumothorax. Otherwise the chest is stable.   XR Chest Port 1 View    Impression    IMPRESSION: The heart is unchanged in appearance. The right PICC and dual-lead pacing device as well as the cardiac valvular prosthesis are all unchanged. The lungs are otherwise unchanged appearance   XR Chest Port 1 View    Impression    IMPRESSION: Very minimal increase in aeration in the right lung. There still remains significant atelectasis right lung base with pleural fluid or thickening. Minimal pleural fluid or thickening left lung base with atelectasis. Cardiac enlargement.   Pulmonary vascular congestion. Aortic calcification. AVR. Right PICC with tip in the low SVC. Left-sided cardiac pacemaker.   XR Chest Port 1 View    Impression    IMPRESSION: Left costophrenic angle is clipped. Prior TAVR and dual lead left subclavian venous pacer. Right upper extremity PICC line catheter is in good position.    Increased opacification in the left lower lobe of concern for worsening pneumonia.    Volume loss on the right with small effusion and  moderate right infrahilar opacities are unchanged. No visible pneumothorax.   XR Chest Port 1 View    Impression    IMPRESSION:     Prior increased left basilar opacification has mildly improved. Otherwise, no significant change of bilateral mid to lower lung predominant opacities / atelectasis and small pleural effusions. Interstitial prominence has increased, suggesting pulmonary   edema.     Stable cardiomediastinal silhouette. Pacemaker. TAVR. Right PICC tip over the distal SVC. No pneumothorax.     CT Chest w/o Contrast    Impression    IMPRESSION:   1.  Removal of right chest tube. Persistent complex small stable volume right-sided pleural fluid. Suggestion of some debris within the fluid at the right chest base. Bubbles of pneumothorax gas on the right are smaller in size.  2.  Moderate left pleural fluid is slightly larger.  3.  Increased hazy opacities at the left upper lobe. Bilateral smooth interstitial thickening. These findings suggest a degree of pulmonary edema.  4.  Aspirated material leading to the right lower lobe again seen, but the aspirated material appears smaller since 08/20/2024. Moderate bibasilar atelectasis.  5.  A few additional stable findings.     US Thoracentesis    Impression    IMPRESSION:  Status post left ultrasound-guided thoracentesis.    Reference CPT Code: 63469   XR Chest Port 1 View    Impression    IMPRESSION: Since the CT of 8/31/2024 there is been significant interval decrease in left-sided pleural fluid. No significant change in moderate right-sided pleural fluid with chronic atelectasis right lung base. Cardiac enlargement. AVR. Mild pulmonary   vascular congestion. Right-sided PICC with the tip in the mid SVC. Left-sided cardiac pacemaker. Marked degenerative osteoarthrosis right glenohumeral joint.   XR Chest Port 1 View    Impression    IMPRESSION: Pleural fluid and/or thickening throughout the periphery of the right hemithorax most marked inferiorly with associated  near complete atelectasis basilar right lower lobe and milder atelectasis throughout the periphery of the mid and upper   right lung. Minimal left basilar pleural fluid and atelectasis. Mild interstitial edema in the mid and lower lungs. Emphysematous change in the mid and upper lungs.    Mild cardiac enlargement, pulmonary venous congestion, and central pulmonary arterial enlargement. Transcatheter aortic valve replacement. Pacer anterior left chest wall with lead tips in the RA and RV. Right PICC tip low SVC.    Overall no significant change.     Echocardiogram Limited   Result Value Ref Range    LVEF  55-60%        SIGNIFICANT LABORATORY FINDINGS       Discharge Orders        XR Chest 2 Views     Primary Care - Care Coordination Referral      Reason for your hospital stay    Empyema, Hypoxia, CHF exacerbation     Follow-up and recommended labs and tests     Follow up with Hospice team at home     Activity    Your activity upon discharge: activity as tolerated     Diet    Follow this diet upon discharge: Current Diet:Orders Placed This Encounter      Snacks/Supplements Adult: Glucerna; With Meals      Regular Diet Adult       Examination   Physical Exam   Temp:  [98.5  F (36.9  C)-99.5  F (37.5  C)] 98.6  F (37  C)  Pulse:  [] 92  Resp:  [12-18] 18  BP: (101-119)/(55-61) 115/59  SpO2:  [90 %-99 %] 99 %  Wt Readings from Last 1 Encounters:   09/09/24 58.1 kg (128 lb 1.4 oz)       General: Elderly female in no distress  CVS:RRR  RS:Non labored breathing  Neurology: Awake, alert, oriented, moving all extremities  Psy: Calm    Please see EMR for more detailed significant labs, imaging, consultant notes etc.    I, Yasmine Gifford MD, personally saw the patient today and spent greater than 30 minutes discharging this patient.    Yasmine Gifford MD  [facility]    CC:Cammy Bui

## 2024-09-10 ENCOUNTER — DOCUMENTATION ONLY (OUTPATIENT)
Dept: OTHER | Facility: CLINIC | Age: 74
End: 2024-09-10
Payer: COMMERCIAL

## 2024-09-17 DIAGNOSIS — I48.91 ATRIAL FIBRILLATION, UNSPECIFIED TYPE (H): ICD-10-CM

## 2024-09-17 RX ORDER — APIXABAN 5 MG/1
5 TABLET, FILM COATED ORAL 2 TIMES DAILY
Qty: 180 TABLET | Refills: 2 | Status: SHIPPED | OUTPATIENT
Start: 2024-09-17

## 2024-09-24 DIAGNOSIS — E11.649 TYPE 2 DIABETES MELLITUS WITH HYPOGLYCEMIA WITHOUT COMA, WITHOUT LONG-TERM CURRENT USE OF INSULIN (H): ICD-10-CM

## 2024-09-24 DIAGNOSIS — Z79.4 TYPE 2 DIABETES MELLITUS WITH HYPOGLYCEMIA WITHOUT COMA, WITH LONG-TERM CURRENT USE OF INSULIN (H): ICD-10-CM

## 2024-09-24 DIAGNOSIS — E11.649 TYPE 2 DIABETES MELLITUS WITH HYPOGLYCEMIA WITHOUT COMA, WITH LONG-TERM CURRENT USE OF INSULIN (H): ICD-10-CM

## 2024-09-24 RX ORDER — BLOOD SUGAR DIAGNOSTIC
STRIP MISCELLANEOUS
Qty: 300 STRIP | Refills: 0 | Status: SHIPPED | OUTPATIENT
Start: 2024-09-24

## 2024-09-24 RX ORDER — GABAPENTIN 600 MG/1
600 TABLET ORAL 3 TIMES DAILY
Qty: 270 TABLET | Refills: 2 | Status: SHIPPED | OUTPATIENT
Start: 2024-09-24

## 2024-09-29 LAB
MDC_IDC_EPISODE_DTM: NORMAL
MDC_IDC_EPISODE_DURATION: 10 S
MDC_IDC_EPISODE_DURATION: 1077 S
MDC_IDC_EPISODE_DURATION: 1086 S
MDC_IDC_EPISODE_DURATION: 117 S
MDC_IDC_EPISODE_DURATION: 122 S
MDC_IDC_EPISODE_DURATION: 1303 S
MDC_IDC_EPISODE_DURATION: 14 S
MDC_IDC_EPISODE_DURATION: 16 S
MDC_IDC_EPISODE_DURATION: 1723 S
MDC_IDC_EPISODE_DURATION: 199 S
MDC_IDC_EPISODE_DURATION: 2054 S
MDC_IDC_EPISODE_DURATION: 23 S
MDC_IDC_EPISODE_DURATION: 247 S
MDC_IDC_EPISODE_DURATION: 2609 S
MDC_IDC_EPISODE_DURATION: 272 S
MDC_IDC_EPISODE_DURATION: 28 S
MDC_IDC_EPISODE_DURATION: 3 S
MDC_IDC_EPISODE_DURATION: 3331 S
MDC_IDC_EPISODE_DURATION: 3633 S
MDC_IDC_EPISODE_DURATION: 374 S
MDC_IDC_EPISODE_DURATION: 38 S
MDC_IDC_EPISODE_DURATION: 4231 S
MDC_IDC_EPISODE_DURATION: 47 S
MDC_IDC_EPISODE_DURATION: 47 S
MDC_IDC_EPISODE_DURATION: 4868 S
MDC_IDC_EPISODE_DURATION: 50 S
MDC_IDC_EPISODE_DURATION: 5020 S
MDC_IDC_EPISODE_DURATION: 51 S
MDC_IDC_EPISODE_DURATION: 7574 S
MDC_IDC_EPISODE_DURATION: 8 S
MDC_IDC_EPISODE_DURATION: 942 S
MDC_IDC_EPISODE_DURATION: NORMAL S
MDC_IDC_EPISODE_ID: 100
MDC_IDC_EPISODE_ID: 101
MDC_IDC_EPISODE_ID: 102
MDC_IDC_EPISODE_ID: 103
MDC_IDC_EPISODE_ID: 104
MDC_IDC_EPISODE_ID: 105
MDC_IDC_EPISODE_ID: 106
MDC_IDC_EPISODE_ID: 107
MDC_IDC_EPISODE_ID: 108
MDC_IDC_EPISODE_ID: 109
MDC_IDC_EPISODE_ID: 110
MDC_IDC_EPISODE_ID: 111
MDC_IDC_EPISODE_ID: 112
MDC_IDC_EPISODE_ID: 113
MDC_IDC_EPISODE_ID: 114
MDC_IDC_EPISODE_ID: 115
MDC_IDC_EPISODE_ID: 116
MDC_IDC_EPISODE_ID: 117
MDC_IDC_EPISODE_ID: 118
MDC_IDC_EPISODE_ID: 119
MDC_IDC_EPISODE_ID: 120
MDC_IDC_EPISODE_ID: 121
MDC_IDC_EPISODE_ID: 122
MDC_IDC_EPISODE_ID: 44
MDC_IDC_EPISODE_ID: 80
MDC_IDC_EPISODE_ID: 84
MDC_IDC_EPISODE_ID: 85
MDC_IDC_EPISODE_ID: 86
MDC_IDC_EPISODE_ID: 87
MDC_IDC_EPISODE_ID: 88
MDC_IDC_EPISODE_ID: 89
MDC_IDC_EPISODE_ID: 90
MDC_IDC_EPISODE_ID: 91
MDC_IDC_EPISODE_ID: 92
MDC_IDC_EPISODE_ID: 93
MDC_IDC_EPISODE_ID: 94
MDC_IDC_EPISODE_ID: 95
MDC_IDC_EPISODE_ID: 96
MDC_IDC_EPISODE_ID: 97
MDC_IDC_EPISODE_ID: 98
MDC_IDC_EPISODE_ID: 99
MDC_IDC_EPISODE_TYPE: NORMAL
MDC_IDC_LEAD_CONNECTION_STATUS: NORMAL
MDC_IDC_LEAD_CONNECTION_STATUS: NORMAL
MDC_IDC_LEAD_IMPLANT_DT: NORMAL
MDC_IDC_LEAD_IMPLANT_DT: NORMAL
MDC_IDC_LEAD_LOCATION: NORMAL
MDC_IDC_LEAD_LOCATION: NORMAL
MDC_IDC_LEAD_LOCATION_DETAIL_1: NORMAL
MDC_IDC_LEAD_LOCATION_DETAIL_1: NORMAL
MDC_IDC_LEAD_MFG: NORMAL
MDC_IDC_LEAD_MFG: NORMAL
MDC_IDC_LEAD_MODEL: NORMAL
MDC_IDC_LEAD_MODEL: NORMAL
MDC_IDC_LEAD_POLARITY_TYPE: NORMAL
MDC_IDC_LEAD_POLARITY_TYPE: NORMAL
MDC_IDC_LEAD_SERIAL: NORMAL
MDC_IDC_LEAD_SERIAL: NORMAL
MDC_IDC_LEAD_SPECIAL_FUNCTION: NORMAL
MDC_IDC_LEAD_SPECIAL_FUNCTION: NORMAL
MDC_IDC_MSMT_BATTERY_DTM: NORMAL
MDC_IDC_MSMT_BATTERY_REMAINING_LONGEVITY: 171 MO
MDC_IDC_MSMT_BATTERY_RRT_TRIGGER: 2.62
MDC_IDC_MSMT_BATTERY_STATUS: NORMAL
MDC_IDC_MSMT_BATTERY_VOLTAGE: 3.17 V
MDC_IDC_MSMT_LEADCHNL_RA_IMPEDANCE_VALUE: 380 OHM
MDC_IDC_MSMT_LEADCHNL_RA_IMPEDANCE_VALUE: 399 OHM
MDC_IDC_MSMT_LEADCHNL_RA_PACING_THRESHOLD_AMPLITUDE: 0.75 V
MDC_IDC_MSMT_LEADCHNL_RA_PACING_THRESHOLD_PULSEWIDTH: 0.4 MS
MDC_IDC_MSMT_LEADCHNL_RA_SENSING_INTR_AMPL: 3.25 MV
MDC_IDC_MSMT_LEADCHNL_RA_SENSING_INTR_AMPL: 3.25 MV
MDC_IDC_MSMT_LEADCHNL_RV_IMPEDANCE_VALUE: 456 OHM
MDC_IDC_MSMT_LEADCHNL_RV_IMPEDANCE_VALUE: 627 OHM
MDC_IDC_MSMT_LEADCHNL_RV_PACING_THRESHOLD_AMPLITUDE: 0.88 V
MDC_IDC_MSMT_LEADCHNL_RV_PACING_THRESHOLD_PULSEWIDTH: 0.4 MS
MDC_IDC_MSMT_LEADCHNL_RV_SENSING_INTR_AMPL: 20.5 MV
MDC_IDC_MSMT_LEADCHNL_RV_SENSING_INTR_AMPL: 20.5 MV
MDC_IDC_PG_IMPLANT_DTM: NORMAL
MDC_IDC_PG_MFG: NORMAL
MDC_IDC_PG_MODEL: NORMAL
MDC_IDC_PG_SERIAL: NORMAL
MDC_IDC_PG_TYPE: NORMAL
MDC_IDC_SESS_CLINIC_NAME: NORMAL
MDC_IDC_SESS_DTM: NORMAL
MDC_IDC_SESS_TYPE: NORMAL
MDC_IDC_SET_BRADY_AT_MODE_SWITCH_RATE: 171 {BEATS}/MIN
MDC_IDC_SET_BRADY_HYSTRATE: NORMAL
MDC_IDC_SET_BRADY_LOWRATE: 60 {BEATS}/MIN
MDC_IDC_SET_BRADY_MAX_SENSOR_RATE: 130 {BEATS}/MIN
MDC_IDC_SET_BRADY_MAX_TRACKING_RATE: 130 {BEATS}/MIN
MDC_IDC_SET_BRADY_MODE: NORMAL
MDC_IDC_SET_BRADY_PAV_DELAY_LOW: 150 MS
MDC_IDC_SET_BRADY_SAV_DELAY_LOW: 120 MS
MDC_IDC_SET_LEADCHNL_RA_PACING_AMPLITUDE: 1.5 V
MDC_IDC_SET_LEADCHNL_RA_PACING_ANODE_ELECTRODE_1: NORMAL
MDC_IDC_SET_LEADCHNL_RA_PACING_ANODE_LOCATION_1: NORMAL
MDC_IDC_SET_LEADCHNL_RA_PACING_CAPTURE_MODE: NORMAL
MDC_IDC_SET_LEADCHNL_RA_PACING_CATHODE_ELECTRODE_1: NORMAL
MDC_IDC_SET_LEADCHNL_RA_PACING_CATHODE_LOCATION_1: NORMAL
MDC_IDC_SET_LEADCHNL_RA_PACING_POLARITY: NORMAL
MDC_IDC_SET_LEADCHNL_RA_PACING_PULSEWIDTH: 0.4 MS
MDC_IDC_SET_LEADCHNL_RA_SENSING_ANODE_ELECTRODE_1: NORMAL
MDC_IDC_SET_LEADCHNL_RA_SENSING_ANODE_LOCATION_1: NORMAL
MDC_IDC_SET_LEADCHNL_RA_SENSING_CATHODE_ELECTRODE_1: NORMAL
MDC_IDC_SET_LEADCHNL_RA_SENSING_CATHODE_LOCATION_1: NORMAL
MDC_IDC_SET_LEADCHNL_RA_SENSING_POLARITY: NORMAL
MDC_IDC_SET_LEADCHNL_RA_SENSING_SENSITIVITY: 0.3 MV
MDC_IDC_SET_LEADCHNL_RV_PACING_AMPLITUDE: 1.5 V
MDC_IDC_SET_LEADCHNL_RV_PACING_ANODE_ELECTRODE_1: NORMAL
MDC_IDC_SET_LEADCHNL_RV_PACING_ANODE_LOCATION_1: NORMAL
MDC_IDC_SET_LEADCHNL_RV_PACING_CAPTURE_MODE: NORMAL
MDC_IDC_SET_LEADCHNL_RV_PACING_CATHODE_ELECTRODE_1: NORMAL
MDC_IDC_SET_LEADCHNL_RV_PACING_CATHODE_LOCATION_1: NORMAL
MDC_IDC_SET_LEADCHNL_RV_PACING_POLARITY: NORMAL
MDC_IDC_SET_LEADCHNL_RV_PACING_PULSEWIDTH: 0.4 MS
MDC_IDC_SET_LEADCHNL_RV_SENSING_ANODE_ELECTRODE_1: NORMAL
MDC_IDC_SET_LEADCHNL_RV_SENSING_ANODE_LOCATION_1: NORMAL
MDC_IDC_SET_LEADCHNL_RV_SENSING_CATHODE_ELECTRODE_1: NORMAL
MDC_IDC_SET_LEADCHNL_RV_SENSING_CATHODE_LOCATION_1: NORMAL
MDC_IDC_SET_LEADCHNL_RV_SENSING_POLARITY: NORMAL
MDC_IDC_SET_LEADCHNL_RV_SENSING_SENSITIVITY: 0.9 MV
MDC_IDC_SET_ZONE_DETECTION_INTERVAL: 350 MS
MDC_IDC_SET_ZONE_DETECTION_INTERVAL: 400 MS
MDC_IDC_SET_ZONE_STATUS: NORMAL
MDC_IDC_SET_ZONE_STATUS: NORMAL
MDC_IDC_SET_ZONE_TYPE: NORMAL
MDC_IDC_SET_ZONE_VENDOR_TYPE: NORMAL
MDC_IDC_STAT_AT_BURDEN_PERCENT: 98.2 %
MDC_IDC_STAT_AT_DTM_END: NORMAL
MDC_IDC_STAT_AT_DTM_START: NORMAL
MDC_IDC_STAT_BRADY_AP_VP_PERCENT: 0 %
MDC_IDC_STAT_BRADY_AP_VS_PERCENT: 0.31 %
MDC_IDC_STAT_BRADY_AS_VP_PERCENT: 0.05 %
MDC_IDC_STAT_BRADY_AS_VS_PERCENT: 99.85 %
MDC_IDC_STAT_BRADY_DTM_END: NORMAL
MDC_IDC_STAT_BRADY_DTM_START: NORMAL
MDC_IDC_STAT_BRADY_RA_PERCENT_PACED: 0.01 %
MDC_IDC_STAT_BRADY_RV_PERCENT_PACED: 1.78 %
MDC_IDC_STAT_EPISODE_RECENT_COUNT: 0
MDC_IDC_STAT_EPISODE_RECENT_COUNT: 1
MDC_IDC_STAT_EPISODE_RECENT_COUNT: 79
MDC_IDC_STAT_EPISODE_RECENT_COUNT_DTM_END: NORMAL
MDC_IDC_STAT_EPISODE_RECENT_COUNT_DTM_START: NORMAL
MDC_IDC_STAT_EPISODE_TOTAL_COUNT: 0
MDC_IDC_STAT_EPISODE_TOTAL_COUNT: 25
MDC_IDC_STAT_EPISODE_TOTAL_COUNT: 60
MDC_IDC_STAT_EPISODE_TOTAL_COUNT_DTM_END: NORMAL
MDC_IDC_STAT_EPISODE_TOTAL_COUNT_DTM_START: NORMAL
MDC_IDC_STAT_EPISODE_TYPE: NORMAL
MDC_IDC_STAT_TACHYTHERAPY_RECENT_DTM_END: NORMAL
MDC_IDC_STAT_TACHYTHERAPY_RECENT_DTM_START: NORMAL
MDC_IDC_STAT_TACHYTHERAPY_TOTAL_DTM_END: NORMAL
MDC_IDC_STAT_TACHYTHERAPY_TOTAL_DTM_START: NORMAL

## 2024-10-03 ENCOUNTER — PATIENT OUTREACH (OUTPATIENT)
Dept: GERIATRIC MEDICINE | Facility: CLINIC | Age: 74
End: 2024-10-03
Payer: COMMERCIAL

## 2024-10-03 NOTE — PROGRESS NOTES
[facility] Care Coordination Contact    Received message from Cynthia that another family member is ready to work the extra PCA hours.   Also received a message from Ina at [facility].    Returned call to Ina, not available and left voice mail.    Samantha Alexandra RN, PHN   [facility]  [phone]

## 2024-10-05 LAB
ACID FAST STAIN (ARUP): NORMAL

## 2024-10-07 DIAGNOSIS — J43.9 PULMONARY EMPHYSEMA, UNSPECIFIED EMPHYSEMA TYPE (H): ICD-10-CM

## 2024-10-07 LAB
ACID FAST STAIN (ARUP): NORMAL

## 2024-10-08 RX ORDER — BUDESONIDE AND FORMOTEROL FUMARATE DIHYDRATE 160; 4.5 UG/1; UG/1
2 AEROSOL RESPIRATORY (INHALATION) 2 TIMES DAILY
Qty: 10.2 G | Refills: 4 | Status: SHIPPED | OUTPATIENT
Start: 2024-10-08

## 2024-10-09 NOTE — PROGRESS NOTES
[facility] Care Coordination Contact    Spoke with Ina and confirmed second personal care attendant. Temp PCA 6 hrs daily effective 10/10/2024 x45 days. Current PCA hrs 5 hrs daily. Total PCA hrs: 11 hrs daily.     Samantha Alexandra RN, PHN   [facility]  [phone]

## 2024-11-18 NOTE — PROVIDER NOTIFICATION
[facility]  Dr. Buckner Progress Note     Subjective:   Sena Reynoso is a(n) 64 year old female.   Current complaints:   Chief Complaint   Patient presents with    Fibromyalgia Syndrome     5 month f/u. Having all over pain. Mostly in base of left thumb. Knee pain. Looking to get it replaced. PT for right shoulder.    History of fibromyalgia, cervical spondylosis, right knee replacement 2019, right shoulder rotator cuff issues.  Unable to use gabapentin.  Uses Norco for pain.  Uses cyclobenzaprine as a muscle relaxant.  Replaced right knee has a bit of tendonitis.  The left knee needs to be replaced.  Right shoulder painful, needs another right shoulder surgery.  Dr. Rodriguez ordered PT for the right shoulder.  Left thumb pain.Using Naproxen 500 mg one or two per day.  Norco for severe pain once a day at most.   Oldest daughter lives with Sena.    Objective:   /70   Pulse 94   Temp 97.5 °F (36.4 °C)   Resp 18   Ht 5' 1\" (1.549 m)   Wt 226 lb (102.5 kg)   SpO2 99%   BMI 42.70 kg/m²   Lungs clear  Heart nsr   Abd obese  soft  Left thumb  tender at mcp  Left knee hypertrophic  tender  Right knee tender   10/25/2024 bone density scan within normal limits.  Lumbar T-score +2.2 hip T-score +1.2.  Assessment:     Encounter Diagnoses   Name Primary?    Primary osteoarthritis of left knee Yes    Primary osteoarthritis of right knee/R TKR sx 2-13-19/Global Exp 5-13-19/TRK/EP     Status post total right knee replacement     Degenerative joint disease of cervical and lumbar spine     Class 3 severe obesity due to excess calories with serious comorbidity and body mass index (BMI) of 40.0 to 44.9 in adult (HCC)     Fibromyalgia     Primary osteoarthritis of both hands      Plan:     Patient Instructions   At [facility] orthopedics hand specialist Dr. Leo Encinas.[phone].  Naproxen 500 mg one or two per day.   Norco 5 mg as needed generally a few per week.  Voltaren gel use on the left knee 3 times per  Paged remote cross cover: /67, HR in 80s-90s, afib. Pt on metoprolol for rate control. Should I hold this tonight?    Ok to give per MD.   day.  Lose weight that will help the knee pain.  Zolpidem 10 mg at night prn.  Pantoprozole 40 mg per day.    Return [facility] office 6 months.          Everett Buckner MD 11/18/2024 10:56 AM

## 2024-11-25 ENCOUNTER — PATIENT OUTREACH (OUTPATIENT)
Dept: GERIATRIC MEDICINE | Facility: CLINIC | Age: 74
End: 2024-11-25
Payer: COMMERCIAL

## 2024-11-25 NOTE — PROGRESS NOTES
[facility] Care Coordination Contact    [facility]:  Emailed required PCA documents to [facility] for temp request.    Esha Gerardo  Care Management Specialist  [facility]  [phone]

## 2024-11-27 ENCOUNTER — PATIENT OUTREACH (OUTPATIENT)
Dept: GERIATRIC MEDICINE | Facility: CLINIC | Age: 74
End: 2024-11-27
Payer: COMMERCIAL

## 2024-11-27 NOTE — PROGRESS NOTES
[facility] Care Coordination Contact    Received call from member and family, requested to extend PCA hours due to still need help with most of the ADLs. Member is bed bound, unable to walk due to weakness in leg and arthritis. She has not had her monthly trigger point injections for months.     She continues to use supplemental oxygen around the clock.     Member has hospice nurse visit 1x/week and   HHA once a week for bed bath.    Health Risk Assessment scheduled for 12/6/2024 at 9:30 AM.    Samantha Alexandra RN, PHN   [facility]  [phone]

## 2024-12-06 ENCOUNTER — PATIENT OUTREACH (OUTPATIENT)
Dept: GERIATRIC MEDICINE | Facility: CLINIC | Age: 74
End: 2024-12-06

## 2024-12-16 ENCOUNTER — ANCILLARY PROCEDURE (OUTPATIENT)
Dept: CARDIOLOGY | Facility: CLINIC | Age: 74
End: 2024-12-16
Attending: INTERNAL MEDICINE
Payer: COMMERCIAL

## 2024-12-16 DIAGNOSIS — I49.5 SICK SINUS SYNDROME (H): ICD-10-CM

## 2024-12-16 DIAGNOSIS — Z95.0 CARDIAC PACEMAKER IN SITU: ICD-10-CM

## 2024-12-16 LAB
MDC_IDC_EPISODE_DTM: NORMAL
MDC_IDC_EPISODE_DURATION: 0 S
MDC_IDC_EPISODE_DURATION: 1 S
MDC_IDC_EPISODE_DURATION: 49 S
MDC_IDC_EPISODE_DURATION: 616 S
MDC_IDC_EPISODE_DURATION: NORMAL S
MDC_IDC_EPISODE_ID: 123
MDC_IDC_EPISODE_ID: 124
MDC_IDC_EPISODE_ID: 125
MDC_IDC_EPISODE_ID: 126
MDC_IDC_EPISODE_ID: 127
MDC_IDC_EPISODE_ID: 128
MDC_IDC_EPISODE_ID: 129
MDC_IDC_EPISODE_TYPE: NORMAL
MDC_IDC_LEAD_CONNECTION_STATUS: NORMAL
MDC_IDC_LEAD_CONNECTION_STATUS: NORMAL
MDC_IDC_LEAD_IMPLANT_DT: NORMAL
MDC_IDC_LEAD_IMPLANT_DT: NORMAL
MDC_IDC_LEAD_LOCATION: NORMAL
MDC_IDC_LEAD_LOCATION: NORMAL
MDC_IDC_LEAD_LOCATION_DETAIL_1: NORMAL
MDC_IDC_LEAD_LOCATION_DETAIL_1: NORMAL
MDC_IDC_LEAD_MFG: NORMAL
MDC_IDC_LEAD_MFG: NORMAL
MDC_IDC_LEAD_MODEL: NORMAL
MDC_IDC_LEAD_MODEL: NORMAL
MDC_IDC_LEAD_POLARITY_TYPE: NORMAL
MDC_IDC_LEAD_POLARITY_TYPE: NORMAL
MDC_IDC_LEAD_SERIAL: NORMAL
MDC_IDC_LEAD_SERIAL: NORMAL
MDC_IDC_LEAD_SPECIAL_FUNCTION: NORMAL
MDC_IDC_LEAD_SPECIAL_FUNCTION: NORMAL
MDC_IDC_MSMT_BATTERY_DTM: NORMAL
MDC_IDC_MSMT_BATTERY_REMAINING_LONGEVITY: 170 MO
MDC_IDC_MSMT_BATTERY_RRT_TRIGGER: 2.62
MDC_IDC_MSMT_BATTERY_STATUS: NORMAL
MDC_IDC_MSMT_BATTERY_VOLTAGE: 3.13 V
MDC_IDC_MSMT_LEADCHNL_RA_IMPEDANCE_VALUE: 342 OHM
MDC_IDC_MSMT_LEADCHNL_RA_IMPEDANCE_VALUE: 380 OHM
MDC_IDC_MSMT_LEADCHNL_RA_PACING_THRESHOLD_AMPLITUDE: 0.62 V
MDC_IDC_MSMT_LEADCHNL_RA_PACING_THRESHOLD_PULSEWIDTH: 0.4 MS
MDC_IDC_MSMT_LEADCHNL_RA_SENSING_INTR_AMPL: 3.25 MV
MDC_IDC_MSMT_LEADCHNL_RA_SENSING_INTR_AMPL: 3.25 MV
MDC_IDC_MSMT_LEADCHNL_RV_IMPEDANCE_VALUE: 437 OHM
MDC_IDC_MSMT_LEADCHNL_RV_IMPEDANCE_VALUE: 627 OHM
MDC_IDC_MSMT_LEADCHNL_RV_PACING_THRESHOLD_AMPLITUDE: 0.88 V
MDC_IDC_MSMT_LEADCHNL_RV_PACING_THRESHOLD_PULSEWIDTH: 0.4 MS
MDC_IDC_MSMT_LEADCHNL_RV_SENSING_INTR_AMPL: 19.12 MV
MDC_IDC_MSMT_LEADCHNL_RV_SENSING_INTR_AMPL: 19.12 MV
MDC_IDC_PG_IMPLANT_DTM: NORMAL
MDC_IDC_PG_MFG: NORMAL
MDC_IDC_PG_MODEL: NORMAL
MDC_IDC_PG_SERIAL: NORMAL
MDC_IDC_PG_TYPE: NORMAL
MDC_IDC_SESS_CLINIC_NAME: NORMAL
MDC_IDC_SESS_DTM: NORMAL
MDC_IDC_SESS_TYPE: NORMAL
MDC_IDC_SET_BRADY_AT_MODE_SWITCH_RATE: 171 {BEATS}/MIN
MDC_IDC_SET_BRADY_HYSTRATE: NORMAL
MDC_IDC_SET_BRADY_LOWRATE: 60 {BEATS}/MIN
MDC_IDC_SET_BRADY_MAX_SENSOR_RATE: 130 {BEATS}/MIN
MDC_IDC_SET_BRADY_MAX_TRACKING_RATE: 130 {BEATS}/MIN
MDC_IDC_SET_BRADY_MODE: NORMAL
MDC_IDC_SET_BRADY_PAV_DELAY_LOW: 150 MS
MDC_IDC_SET_BRADY_SAV_DELAY_LOW: 120 MS
MDC_IDC_SET_LEADCHNL_RA_PACING_AMPLITUDE: 1.5 V
MDC_IDC_SET_LEADCHNL_RA_PACING_ANODE_ELECTRODE_1: NORMAL
MDC_IDC_SET_LEADCHNL_RA_PACING_ANODE_LOCATION_1: NORMAL
MDC_IDC_SET_LEADCHNL_RA_PACING_CAPTURE_MODE: NORMAL
MDC_IDC_SET_LEADCHNL_RA_PACING_CATHODE_ELECTRODE_1: NORMAL
MDC_IDC_SET_LEADCHNL_RA_PACING_CATHODE_LOCATION_1: NORMAL
MDC_IDC_SET_LEADCHNL_RA_PACING_POLARITY: NORMAL
MDC_IDC_SET_LEADCHNL_RA_PACING_PULSEWIDTH: 0.4 MS
MDC_IDC_SET_LEADCHNL_RA_SENSING_ANODE_ELECTRODE_1: NORMAL
MDC_IDC_SET_LEADCHNL_RA_SENSING_ANODE_LOCATION_1: NORMAL
MDC_IDC_SET_LEADCHNL_RA_SENSING_CATHODE_ELECTRODE_1: NORMAL
MDC_IDC_SET_LEADCHNL_RA_SENSING_CATHODE_LOCATION_1: NORMAL
MDC_IDC_SET_LEADCHNL_RA_SENSING_POLARITY: NORMAL
MDC_IDC_SET_LEADCHNL_RA_SENSING_SENSITIVITY: 0.3 MV
MDC_IDC_SET_LEADCHNL_RV_PACING_AMPLITUDE: 1.5 V
MDC_IDC_SET_LEADCHNL_RV_PACING_ANODE_ELECTRODE_1: NORMAL
MDC_IDC_SET_LEADCHNL_RV_PACING_ANODE_LOCATION_1: NORMAL
MDC_IDC_SET_LEADCHNL_RV_PACING_CAPTURE_MODE: NORMAL
MDC_IDC_SET_LEADCHNL_RV_PACING_CATHODE_ELECTRODE_1: NORMAL
MDC_IDC_SET_LEADCHNL_RV_PACING_CATHODE_LOCATION_1: NORMAL
MDC_IDC_SET_LEADCHNL_RV_PACING_POLARITY: NORMAL
MDC_IDC_SET_LEADCHNL_RV_PACING_PULSEWIDTH: 0.4 MS
MDC_IDC_SET_LEADCHNL_RV_SENSING_ANODE_ELECTRODE_1: NORMAL
MDC_IDC_SET_LEADCHNL_RV_SENSING_ANODE_LOCATION_1: NORMAL
MDC_IDC_SET_LEADCHNL_RV_SENSING_CATHODE_ELECTRODE_1: NORMAL
MDC_IDC_SET_LEADCHNL_RV_SENSING_CATHODE_LOCATION_1: NORMAL
MDC_IDC_SET_LEADCHNL_RV_SENSING_POLARITY: NORMAL
MDC_IDC_SET_LEADCHNL_RV_SENSING_SENSITIVITY: 0.9 MV
MDC_IDC_SET_ZONE_DETECTION_INTERVAL: 350 MS
MDC_IDC_SET_ZONE_DETECTION_INTERVAL: 400 MS
MDC_IDC_SET_ZONE_STATUS: NORMAL
MDC_IDC_SET_ZONE_STATUS: NORMAL
MDC_IDC_SET_ZONE_TYPE: NORMAL
MDC_IDC_SET_ZONE_VENDOR_TYPE: NORMAL
MDC_IDC_STAT_AT_BURDEN_PERCENT: 0.4 %
MDC_IDC_STAT_AT_DTM_END: NORMAL
MDC_IDC_STAT_AT_DTM_START: NORMAL
MDC_IDC_STAT_BRADY_AP_VP_PERCENT: 0.01 %
MDC_IDC_STAT_BRADY_AP_VS_PERCENT: 6.28 %
MDC_IDC_STAT_BRADY_AS_VP_PERCENT: 0.04 %
MDC_IDC_STAT_BRADY_AS_VS_PERCENT: 93.66 %
MDC_IDC_STAT_BRADY_DTM_END: NORMAL
MDC_IDC_STAT_BRADY_DTM_START: NORMAL
MDC_IDC_STAT_BRADY_RA_PERCENT_PACED: 6.33 %
MDC_IDC_STAT_BRADY_RV_PERCENT_PACED: 0.06 %
MDC_IDC_STAT_EPISODE_RECENT_COUNT: 0
MDC_IDC_STAT_EPISODE_RECENT_COUNT: 0
MDC_IDC_STAT_EPISODE_RECENT_COUNT: 1
MDC_IDC_STAT_EPISODE_RECENT_COUNT: 2
MDC_IDC_STAT_EPISODE_RECENT_COUNT: 4
MDC_IDC_STAT_EPISODE_RECENT_COUNT_DTM_END: NORMAL
MDC_IDC_STAT_EPISODE_RECENT_COUNT_DTM_START: NORMAL
MDC_IDC_STAT_EPISODE_TOTAL_COUNT: 0
MDC_IDC_STAT_EPISODE_TOTAL_COUNT: 0
MDC_IDC_STAT_EPISODE_TOTAL_COUNT: 26
MDC_IDC_STAT_EPISODE_TOTAL_COUNT: 4
MDC_IDC_STAT_EPISODE_TOTAL_COUNT: 62
MDC_IDC_STAT_EPISODE_TOTAL_COUNT_DTM_END: NORMAL
MDC_IDC_STAT_EPISODE_TOTAL_COUNT_DTM_START: NORMAL
MDC_IDC_STAT_EPISODE_TYPE: NORMAL
MDC_IDC_STAT_TACHYTHERAPY_RECENT_DTM_END: NORMAL
MDC_IDC_STAT_TACHYTHERAPY_RECENT_DTM_START: NORMAL
MDC_IDC_STAT_TACHYTHERAPY_TOTAL_DTM_END: NORMAL
MDC_IDC_STAT_TACHYTHERAPY_TOTAL_DTM_START: NORMAL

## 2024-12-16 PROCEDURE — 93296 REM INTERROG EVL PM/IDS: CPT | Performed by: INTERNAL MEDICINE

## 2024-12-16 PROCEDURE — 93294 REM INTERROG EVL PM/LDLS PM: CPT | Performed by: INTERNAL MEDICINE

## 2024-12-29 DIAGNOSIS — E11.649 TYPE 2 DIABETES MELLITUS WITH HYPOGLYCEMIA WITHOUT COMA, WITHOUT LONG-TERM CURRENT USE OF INSULIN (H): ICD-10-CM

## 2024-12-29 RX ORDER — BLOOD SUGAR DIAGNOSTIC
STRIP MISCELLANEOUS
Qty: 300 STRIP | Refills: 1 | Status: SHIPPED | OUTPATIENT
Start: 2024-12-29

## 2024-12-30 DIAGNOSIS — J44.1 COPD EXACERBATION (H): ICD-10-CM

## 2024-12-30 DIAGNOSIS — J44.0 CHRONIC OBSTRUCTIVE PULMONARY DISEASE WITH ACUTE LOWER RESPIRATORY INFECTION (H): ICD-10-CM

## 2024-12-30 RX ORDER — IPRATROPIUM BROMIDE AND ALBUTEROL SULFATE 2.5; .5 MG/3ML; MG/3ML
SOLUTION RESPIRATORY (INHALATION)
Qty: 90 ML | Refills: 0 | Status: SHIPPED | OUTPATIENT
Start: 2024-12-30

## 2024-12-30 RX ORDER — ALBUTEROL SULFATE 90 UG/1
2 INHALANT RESPIRATORY (INHALATION) EVERY 4 HOURS PRN
Qty: 13.4 G | Refills: 1 | Status: SHIPPED | OUTPATIENT
Start: 2024-12-30

## 2024-12-30 NOTE — ADDENDUM NOTE
Addended by: ARVIN LOVING on: 5/1/2024 06:39 AM     Modules accepted: Orders     Detail Level: Detailed

## 2025-01-03 NOTE — TELEPHONE ENCOUNTER
Medication was discontinued during hospital visit on 08/10/2024.    Attempt # 1  Called Phone # [phone]      Was Call answered? No     Non-detailed voicemail left on January 3, 2025 11:27 AM to call clinic at: [phone].     On Call Back:     Please relay need to confirm patient is taking medication still.      Thank you,  , Triage RN Eladio Sweet    11:27 AM 1/3/2025

## 2025-01-08 ENCOUNTER — TELEPHONE (OUTPATIENT)
Dept: CARDIOLOGY | Facility: CLINIC | Age: 75
End: 2025-01-08
Payer: COMMERCIAL

## 2025-01-08 NOTE — TELEPHONE ENCOUNTER
[facility] Call Center    Phone Message    May a detailed message be left on voicemail: yes     Reason for Call: Other: The patient is at home on Hospice and she said she will call to schedule if she can get up and moving. Please call her or send a my chart message if we need anything from her.     Action Taken: Other: Cardiology    Travel Screening: Not Applicable    Thank you!  [facility]       Date of Service:

## 2025-01-13 NOTE — TELEPHONE ENCOUNTER
2nd attempt: Left voicemail requesting patient return call to clinic.     Antonia Lynch RN  [facility]

## 2025-01-14 NOTE — TELEPHONE ENCOUNTER
Discussed with patient. She was not aware that this was discontinued. She ran out recently and states her GERD symptoms have returned. She would like to continue taking this. Her hospice nurse was in agreement.     Routing to PCP for final review.

## 2025-01-20 ENCOUNTER — VIRTUAL VISIT (OUTPATIENT)
Dept: FAMILY MEDICINE | Facility: CLINIC | Age: 75
End: 2025-01-20
Payer: COMMERCIAL

## 2025-01-20 DIAGNOSIS — I50.32 CHRONIC HEART FAILURE WITH PRESERVED EJECTION FRACTION (H): ICD-10-CM

## 2025-01-20 DIAGNOSIS — J43.9 PULMONARY EMPHYSEMA, UNSPECIFIED EMPHYSEMA TYPE (H): ICD-10-CM

## 2025-01-20 DIAGNOSIS — M62.81 GENERALIZED MUSCLE WEAKNESS: Primary | ICD-10-CM

## 2025-01-20 DIAGNOSIS — R06.02 SOB (SHORTNESS OF BREATH): ICD-10-CM

## 2025-01-20 PROCEDURE — 98013 SYNCH AUDIO-ONLY EST LOW 20: CPT | Performed by: FAMILY MEDICINE

## 2025-01-20 ASSESSMENT — ENCOUNTER SYMPTOMS
WEAKNESS: 1
SHORTNESS OF BREATH: 1
FATIGUE: 1
ACTIVITY CHANGE: 1

## 2025-01-20 ASSESSMENT — ANXIETY QUESTIONNAIRES
3. WORRYING TOO MUCH ABOUT DIFFERENT THINGS: NOT AT ALL
GAD7 TOTAL SCORE: 0
5. BEING SO RESTLESS THAT IT IS HARD TO SIT STILL: NOT AT ALL
6. BECOMING EASILY ANNOYED OR IRRITABLE: NOT AT ALL
GAD7 TOTAL SCORE: 0
7. FEELING AFRAID AS IF SOMETHING AWFUL MIGHT HAPPEN: NOT AT ALL
1. FEELING NERVOUS, ANXIOUS, OR ON EDGE: NOT AT ALL
IF YOU CHECKED OFF ANY PROBLEMS ON THIS QUESTIONNAIRE, HOW DIFFICULT HAVE THESE PROBLEMS MADE IT FOR YOU TO DO YOUR WORK, TAKE CARE OF THINGS AT HOME, OR GET ALONG WITH OTHER PEOPLE: NOT DIFFICULT AT ALL
4. TROUBLE RELAXING: NOT AT ALL
GAD7 TOTAL SCORE: 0
8. IF YOU CHECKED OFF ANY PROBLEMS, HOW DIFFICULT HAVE THESE MADE IT FOR YOU TO DO YOUR WORK, TAKE CARE OF THINGS AT HOME, OR GET ALONG WITH OTHER PEOPLE?: NOT DIFFICULT AT ALL
7. FEELING AFRAID AS IF SOMETHING AWFUL MIGHT HAPPEN: NOT AT ALL
2. NOT BEING ABLE TO STOP OR CONTROL WORRYING: NOT AT ALL

## 2025-01-20 ASSESSMENT — PATIENT HEALTH QUESTIONNAIRE - PHQ9
SUM OF ALL RESPONSES TO PHQ QUESTIONS 1-9: 5
SUM OF ALL RESPONSES TO PHQ QUESTIONS 1-9: 5
10. IF YOU CHECKED OFF ANY PROBLEMS, HOW DIFFICULT HAVE THESE PROBLEMS MADE IT FOR YOU TO DO YOUR WORK, TAKE CARE OF THINGS AT HOME, OR GET ALONG WITH OTHER PEOPLE: SOMEWHAT DIFFICULT

## 2025-01-20 NOTE — PROGRESS NOTES
Mary Kay is a 75 year old who is being evaluated via a billable telephone visit.    What phone number would you like to be contacted at? [phone] (   How would you like to obtain your AVS? MyChart, home   Originating Location (pt. Location): Home    Distant Location (provider location):  On-site  Telephone visit completed due to the patient did not have access to video, while the distant provider did.    1. Generalized muscle weakness (Primary)  2. SOB (shortness of breath)  3. Chronic heart failure with preserved ejection fraction (H)  4. Pulmonary emphysema, unspecified emphysema type (H)  This is a 76 yo female on home hospice.  Has underlying COPD and chronic heart failure with chronic shortness of breath and oxygen dependence.  Feels she would benefit from some physical therapy for her generalized weakness.  Has discussed with her hospice workers.  Will try to discuss with her worker as noted in HPI.        Subjective   Mary Kay is a 75 year old, presenting for the following health issues:  Physical Therapy (Pt stated would like Therapy pt on hospice now can't move would like help to move around )        7/29/2024     5:04 PM   Additional Questions   Roomed by Shefali VEGA   Accompanied by self     6:04 pm  Had PT initially   Via Alexandra - started paperwork [phone]    On 4L oxygen continuous -     Has nurse 1/week  Has bed/bath/beyond! At least weekly   Aides come in at noon-sushila -   Granddaughters help; daughter - in - law helps her  6:14 (phone)      History of Present Illness       Reason for visit:  Threapy   She is taking medications regularly.       Review of Systems   Constitutional:  Positive for activity change (decreased) and fatigue.   Respiratory:  Positive for shortness of breath.    Neurological:  Positive for weakness.   All other systems reviewed and are negative.          Objective           Vitals:  No vitals were obtained today due to virtual visit.    Physical Exam   General: Alert and no  distress //Respiratory: No audible wheeze, cough, or shortness of breath // Psychiatric:  Appropriate affect, tone, and pace of words      No results found. However, due to the size of the patient record, not all encounters were searched. Please check Results Review for a complete set of results.      Phone call duration: 10 minutes (phone), 5 minutes paper/chart work.  Total - 15 minutes  Signed Electronically by: SAVANA SILVER MD

## 2025-01-29 ASSESSMENT — ACTIVITIES OF DAILY LIVING (ADL)
DEPENDENT_IADLS:: CLEANING;COOKING;LAUNDRY;SHOPPING;MEAL PREPARATION;MEDICATION MANAGEMENT;MONEY MANAGEMENT;TRANSPORTATION;INCONTINENCE

## 2025-01-29 NOTE — PROGRESS NOTES
"[facility] Care Coordination Contact    [facility] Early Reassessment     Home visit for Change in Condition Health Risk Assessment with Mary Kay Tejada completed on December 6, 2024    Reason for Early reassessment: Health Status Change  Yes, if yes explain increase in need of assistance with ADLs/IADLs due to non-weight bearing, \"bad\" arthritis in the right knee, pain in right shoulder and neck. Member has been to clinic to get the trigger point injection since she was discharged from the hospital. She used to get it monthly at primary care clinic.     Type of residence:: Private home - no stairs  Current living arrangement:: I live in a private home with family     Assessment completed with:: Patient    Current Care Plan  Member currently receiving the following home care services:  ([facility]) hospice nurse visit 2-3 x per week. HHA 1x per week for bed bath.   Member currently receiving the following community resources: [facility], [facility], PCA & homemaking services.      Medication Review  Medication reconciliation completed in Epic: No, member had recent clinic visit.  Medication set-up & administration: Family/informal caregiver sets up weekly.  Self-administers medications and family reminds .  Medication Risk Assessment Medication (1 or more, place referral to MTM): N/A: No risk factors identified  MTM Referral Placed: No: No risk factors idenified    Mental/Behavioral Health   Depression Screening:   PHQ-2 Total Score (Adult) - Positive if 3 or more points; Administer PHQ-9 if positive: 0        Mental health DX:: Yes   Mental health DX how managed:: None    Falls Assessment:   Fallen 2 or more times in the past year?: No   Any fall with injury in the past year?: No    ADL/IADL Dependencies:   Dependent ADLs:: Toileting, Bathing, Dressing, Grooming, Incontinence, Positioning, Transfers  Dependent IADLs:: Cleaning, Cooking, Laundry, Shopping, Meal Preparation, Medication " Management, Money Management, Transportation, Incontinence    Health Plan sponsored benefits: [facility]: Shared information regarding [employer]. Reviewed preventative health screening and health plan supplemental benefits/incentives. Reviewed medication disposal form and transition of care member handout.    CFSS Assessment completed at visit: Yes Annual CFSS assessment indicated 10 hours per day of CFSS. This is an increase from the previous assessment.      Elderly Waiver Eligibility: Yes-will continue on EW    Care Plan & Recommendations: Member will continue to reside at home with formal services. Homemaking services will be terminated due to increased in PCA hours.     Will fax special transportation form to clinic for PCP to complete.     [place].    Follow-Up Plan: Member informed of future contact, plan to f/u with member with a 6 month telephone assessment.  Contact information shared with member and family, encouraged member to call with any questions or concerns at any time.    [place] care continuum providers: Please see Snapshot and Care Management Flowsheets for Specific details of care plan.    This CC note routed to PCP, Cammy Bui RN, PHN   [facility]  [phone]

## 2025-01-30 ENCOUNTER — PATIENT OUTREACH (OUTPATIENT)
Dept: GERIATRIC MEDICINE | Facility: CLINIC | Age: 75
End: 2025-01-30
Payer: COMMERCIAL

## 2025-01-30 ENCOUNTER — TELEPHONE (OUTPATIENT)
Dept: FAMILY MEDICINE | Facility: CLINIC | Age: 75
End: 2025-01-30
Payer: COMMERCIAL

## 2025-01-30 DIAGNOSIS — M62.81 GENERALIZED MUSCLE WEAKNESS: Primary | ICD-10-CM

## 2025-01-30 DIAGNOSIS — J43.9 PULMONARY EMPHYSEMA, UNSPECIFIED EMPHYSEMA TYPE (H): ICD-10-CM

## 2025-01-30 DIAGNOSIS — I50.32 CHRONIC HEART FAILURE WITH PRESERVED EJECTION FRACTION (H): ICD-10-CM

## 2025-01-30 DIAGNOSIS — M79.7 FIBROMYALGIA: ICD-10-CM

## 2025-01-30 NOTE — PROGRESS NOTES
[facility] Care Coordination Contact    Called member and shared information regarding PCA services and termination for homemaking services.  Member completed a face to face visit with PCP for in-home PT, however there is no order on file. CC will send request to PCP.    Telephone call to [facility] and spoke with Paul YAÑEZ: in-home PT. They have access to member's EMR, she will complete referral when order is by PCP.    Samantha Alexandra RN, PHN   [facility]  [phone]

## 2025-01-30 NOTE — PROGRESS NOTES
[facility] Care Coordination Contact    Received late entry of after visit chart from care coordinator 01/30/2025  Completed following tasks: Mailed copy of support plan to member, Mailed [place] Cho[place] signature sheet pages 3-4, Mailed Safe Medication Disposal , Submitted referrals/auths for Hmking, PERS, and Updated services in Database.  , Provider Signature - No Support Plan Shared:  Member indicates that they do not want their support plan shared with any EW providers.    [facility]:  Emailed required CFSS documents to are.  Mailed member supplemental summary chart and assessment summary letter.     Esha Gerardo  Care Management Specialist  [facility]  [phone]

## 2025-01-30 NOTE — TELEPHONE ENCOUNTER
Forms/Letter Request    Type of form/letter: Transportation ([employer])      Is Release of Information needed?: No  Was an CARRIE obtained?  No    Do we have the form/letter: Yes: PCP in basket    Who is the form from? [employer] (if other please explain)    Where did/will the form come from? form was faxed in    When is form/letter needed by: ASAP    How would you like the form/letter returned: Fax : [phone]    Patient Notified form requests are processed in 5-7 business days:No

## 2025-01-30 NOTE — PROGRESS NOTES
[facility] Care Coordination Contact    Received a request to submit a DTR for the terminated of Homemaking. Documentation completed and faxed to the health plan. Care Coordinator aware.    Renée Contreras RN  Utilization   [facility]  [phone]

## 2025-01-30 NOTE — LETTER
January 30, 2025       MARY KAY HINDS  [address] [place] [id number]      Dear Mary Kay,    At [facility], we re dedicated to improving your health and wellness. Enclosed is the Support Plan developed with you on 12/06/2024. Please review the Support Plan carefully.    As a reminder, during your visit we talked about:   Ways to manage your physical and mental health   Using health care to maintain and improve your health    Your preventive care needs      Remember to contact your care coordinator if you:   Are hospitalized or plan to be hospitalized    Have a fall     Have a change in your physical or mental health   Need help finding support or services    If you have questions or don t agree with your Support Plan, call me at [phone]. You can also call me if your needs change. TTY users call the [place] [employer] at 974 or 1-372.920.7719 (hofnep-ke-pgdoer relay service).    Sincerely,       Samantha Alexandra RN, PHN  [phone]  [email or web address]                [id number] accepted     (06/2024)                [address] [place] [address]  [phone]  fax [phone]  [facility]

## 2025-02-04 ENCOUNTER — TELEPHONE (OUTPATIENT)
Dept: FAMILY MEDICINE | Facility: CLINIC | Age: 75
End: 2025-02-04
Payer: COMMERCIAL

## 2025-02-04 NOTE — TELEPHONE ENCOUNTER
Home Care is calling regarding an established patient with [facility].       Requesting orders from: Cammy Bui  Provider is following patient: Yes  Is this a 60-day recertification request?  No    Orders Requested    Physical Therapy  Request for delay in care, service is not able to be provided within same scheduled day.   For 2/6/25    Is provider able to follow and sign all home are orders as well?    Shauna PT with [facility]  Call got dropped prior to finishing up. RN called back to [facility] but  could not find a Shauna that is in therapy department.  RN left a number for [facility] Villatoro triage to call back.     Oksana called back     [phone].  Confirmed ok to leave a detailed message with call back.  Contact information confirmed and updated as needed.    Rehan Hurtado RN

## 2025-02-05 NOTE — TELEPHONE ENCOUNTER
Patient has an appt with Aepona tomorrow morning. Pleas call  [phone]. Okay to leave a message

## 2025-02-10 ENCOUNTER — TELEPHONE (OUTPATIENT)
Dept: CARDIOLOGY | Facility: CLINIC | Age: 75
End: 2025-02-10
Payer: COMMERCIAL

## 2025-02-10 NOTE — TELEPHONE ENCOUNTER
Patient called our Device tech line, informed me she is on hospice now, but wishes to do remotes only. I let her know I will forward this request to our Device Team/SHELBY for approval. Patient would appreciate a call back just to let her know this is okay with SHELBY. Best number [phone]       Denise Lovett  Device Tech  [phone]

## 2025-02-10 NOTE — TELEPHONE ENCOUNTER
[facility] Call Center    Phone Message    May a detailed message be left on voicemail: yes     Reason for Call: Other: Pt is on hospice and can not come in for a device check. Pt did call to device team to inquire about a remote check if possible. Please call pt back with nay questions. Thank you     Action Taken: TE SENT    Travel Screening: Not Applicable     Date of Service:                                   Thank you!  [facility]

## 2025-02-10 NOTE — TELEPHONE ENCOUNTER
Donnell Leon MD Gebel, Mandy L, RN  Caller: Unspecified (Today, 11:05 AM)  Sure  May need to come in if need arises          Above noted.     Radha Hobbs, RN

## 2025-02-10 NOTE — TELEPHONE ENCOUNTER
Request for Change in Device Follow-up    Mary Kay Tejada requests to change their device follow-up. Standard of care for device follow-up is an annual in clinic device check and remote transmissions every three months. Mary Kay Tejada requests to change their follow-up to remote follow-up only .     Device Data  Pacemaker and Dual  Device: Medtronic, PPM [place] (D)  Implant date: 3/7/2024  Diagnosis: Syncope/Sinus Node dysfunction     Chart and Device Review  Patient status information: Now on hospice  Device status information: good/stable    Is the patient pacing dependent? No  Does the patient currently do routine remote monitoring? Yes  What is the estimated battery longevity? 14 years  2months  Are automatic thresholds available and turned on in the device? Yes    Device RN recommendation  Change to remotes only per patient request       Routed to physician: Dr. Donnell Leon (device reader)    Radha Hobbs RN

## 2025-02-18 DIAGNOSIS — J44.1 COPD EXACERBATION (H): ICD-10-CM

## 2025-02-19 RX ORDER — IPRATROPIUM BROMIDE AND ALBUTEROL SULFATE 2.5; .5 MG/3ML; MG/3ML
SOLUTION RESPIRATORY (INHALATION)
Qty: 90 ML | Refills: 0 | Status: SHIPPED | OUTPATIENT
Start: 2025-02-19

## 2025-02-25 DIAGNOSIS — J44.0 CHRONIC OBSTRUCTIVE PULMONARY DISEASE WITH ACUTE LOWER RESPIRATORY INFECTION (H): ICD-10-CM

## 2025-02-25 RX ORDER — ALBUTEROL SULFATE 90 UG/1
INHALANT RESPIRATORY (INHALATION)
Qty: 13.4 G | Refills: 3 | Status: SHIPPED | OUTPATIENT
Start: 2025-02-25

## 2025-02-26 DIAGNOSIS — Z95.2 S/P TAVR (TRANSCATHETER AORTIC VALVE REPLACEMENT): Primary | ICD-10-CM

## 2025-03-15 ENCOUNTER — HEALTH MAINTENANCE LETTER (OUTPATIENT)
Age: 75
End: 2025-03-15

## 2025-03-18 ENCOUNTER — TELEPHONE (OUTPATIENT)
Dept: CARDIOLOGY | Facility: CLINIC | Age: 75
End: 2025-03-18

## 2025-03-18 ENCOUNTER — ANCILLARY PROCEDURE (OUTPATIENT)
Dept: CARDIOLOGY | Facility: CLINIC | Age: 75
End: 2025-03-18
Attending: INTERNAL MEDICINE
Payer: COMMERCIAL

## 2025-03-18 DIAGNOSIS — Z95.0 CARDIAC PACEMAKER IN SITU: ICD-10-CM

## 2025-03-18 DIAGNOSIS — I49.5 SICK SINUS SYNDROME (H): ICD-10-CM

## 2025-03-18 LAB
MDC_IDC_EPISODE_DTM: NORMAL
MDC_IDC_EPISODE_DURATION: 1 S
MDC_IDC_EPISODE_DURATION: 1 S
MDC_IDC_EPISODE_DURATION: 2 S
MDC_IDC_EPISODE_DURATION: 3 S
MDC_IDC_EPISODE_ID: 130
MDC_IDC_EPISODE_ID: 131
MDC_IDC_EPISODE_ID: 132
MDC_IDC_EPISODE_ID: 133
MDC_IDC_EPISODE_TYPE: NORMAL
MDC_IDC_LEAD_CONNECTION_STATUS: NORMAL
MDC_IDC_LEAD_CONNECTION_STATUS: NORMAL
MDC_IDC_LEAD_IMPLANT_DT: NORMAL
MDC_IDC_LEAD_IMPLANT_DT: NORMAL
MDC_IDC_LEAD_LOCATION: NORMAL
MDC_IDC_LEAD_LOCATION: NORMAL
MDC_IDC_LEAD_LOCATION_DETAIL_1: NORMAL
MDC_IDC_LEAD_LOCATION_DETAIL_1: NORMAL
MDC_IDC_LEAD_MFG: NORMAL
MDC_IDC_LEAD_MFG: NORMAL
MDC_IDC_LEAD_MODEL: NORMAL
MDC_IDC_LEAD_MODEL: NORMAL
MDC_IDC_LEAD_POLARITY_TYPE: NORMAL
MDC_IDC_LEAD_POLARITY_TYPE: NORMAL
MDC_IDC_LEAD_SERIAL: NORMAL
MDC_IDC_LEAD_SERIAL: NORMAL
MDC_IDC_LEAD_SPECIAL_FUNCTION: NORMAL
MDC_IDC_LEAD_SPECIAL_FUNCTION: NORMAL
MDC_IDC_MSMT_BATTERY_DTM: NORMAL
MDC_IDC_MSMT_BATTERY_REMAINING_LONGEVITY: 169 MO
MDC_IDC_MSMT_BATTERY_RRT_TRIGGER: 2.62
MDC_IDC_MSMT_BATTERY_STATUS: NORMAL
MDC_IDC_MSMT_BATTERY_VOLTAGE: 3.09 V
MDC_IDC_MSMT_LEADCHNL_RA_IMPEDANCE_VALUE: 342 OHM
MDC_IDC_MSMT_LEADCHNL_RA_IMPEDANCE_VALUE: 475 OHM
MDC_IDC_MSMT_LEADCHNL_RA_PACING_THRESHOLD_AMPLITUDE: 0.5 V
MDC_IDC_MSMT_LEADCHNL_RA_PACING_THRESHOLD_PULSEWIDTH: 0.4 MS
MDC_IDC_MSMT_LEADCHNL_RA_SENSING_INTR_AMPL: 3.25 MV
MDC_IDC_MSMT_LEADCHNL_RA_SENSING_INTR_AMPL: 3.25 MV
MDC_IDC_MSMT_LEADCHNL_RV_IMPEDANCE_VALUE: 418 OHM
MDC_IDC_MSMT_LEADCHNL_RV_IMPEDANCE_VALUE: 608 OHM
MDC_IDC_MSMT_LEADCHNL_RV_PACING_THRESHOLD_AMPLITUDE: 1 V
MDC_IDC_MSMT_LEADCHNL_RV_PACING_THRESHOLD_PULSEWIDTH: 0.4 MS
MDC_IDC_MSMT_LEADCHNL_RV_SENSING_INTR_AMPL: 18.5 MV
MDC_IDC_MSMT_LEADCHNL_RV_SENSING_INTR_AMPL: 18.5 MV
MDC_IDC_PG_IMPLANT_DTM: NORMAL
MDC_IDC_PG_MFG: NORMAL
MDC_IDC_PG_MODEL: NORMAL
MDC_IDC_PG_SERIAL: NORMAL
MDC_IDC_PG_TYPE: NORMAL
MDC_IDC_SESS_CLINIC_NAME: NORMAL
MDC_IDC_SESS_DTM: NORMAL
MDC_IDC_SESS_TYPE: NORMAL
MDC_IDC_SET_BRADY_AT_MODE_SWITCH_RATE: 171 {BEATS}/MIN
MDC_IDC_SET_BRADY_HYSTRATE: NORMAL
MDC_IDC_SET_BRADY_LOWRATE: 60 {BEATS}/MIN
MDC_IDC_SET_BRADY_MAX_SENSOR_RATE: 130 {BEATS}/MIN
MDC_IDC_SET_BRADY_MAX_TRACKING_RATE: 130 {BEATS}/MIN
MDC_IDC_SET_BRADY_MODE: NORMAL
MDC_IDC_SET_BRADY_PAV_DELAY_LOW: 150 MS
MDC_IDC_SET_BRADY_SAV_DELAY_LOW: 120 MS
MDC_IDC_SET_LEADCHNL_RA_PACING_AMPLITUDE: 1.5 V
MDC_IDC_SET_LEADCHNL_RA_PACING_ANODE_ELECTRODE_1: NORMAL
MDC_IDC_SET_LEADCHNL_RA_PACING_ANODE_LOCATION_1: NORMAL
MDC_IDC_SET_LEADCHNL_RA_PACING_CAPTURE_MODE: NORMAL
MDC_IDC_SET_LEADCHNL_RA_PACING_CATHODE_ELECTRODE_1: NORMAL
MDC_IDC_SET_LEADCHNL_RA_PACING_CATHODE_LOCATION_1: NORMAL
MDC_IDC_SET_LEADCHNL_RA_PACING_POLARITY: NORMAL
MDC_IDC_SET_LEADCHNL_RA_PACING_PULSEWIDTH: 0.4 MS
MDC_IDC_SET_LEADCHNL_RA_SENSING_ANODE_ELECTRODE_1: NORMAL
MDC_IDC_SET_LEADCHNL_RA_SENSING_ANODE_LOCATION_1: NORMAL
MDC_IDC_SET_LEADCHNL_RA_SENSING_CATHODE_ELECTRODE_1: NORMAL
MDC_IDC_SET_LEADCHNL_RA_SENSING_CATHODE_LOCATION_1: NORMAL
MDC_IDC_SET_LEADCHNL_RA_SENSING_POLARITY: NORMAL
MDC_IDC_SET_LEADCHNL_RA_SENSING_SENSITIVITY: 0.3 MV
MDC_IDC_SET_LEADCHNL_RV_PACING_AMPLITUDE: 1.5 V
MDC_IDC_SET_LEADCHNL_RV_PACING_ANODE_ELECTRODE_1: NORMAL
MDC_IDC_SET_LEADCHNL_RV_PACING_ANODE_LOCATION_1: NORMAL
MDC_IDC_SET_LEADCHNL_RV_PACING_CAPTURE_MODE: NORMAL
MDC_IDC_SET_LEADCHNL_RV_PACING_CATHODE_ELECTRODE_1: NORMAL
MDC_IDC_SET_LEADCHNL_RV_PACING_CATHODE_LOCATION_1: NORMAL
MDC_IDC_SET_LEADCHNL_RV_PACING_POLARITY: NORMAL
MDC_IDC_SET_LEADCHNL_RV_PACING_PULSEWIDTH: 0.4 MS
MDC_IDC_SET_LEADCHNL_RV_SENSING_ANODE_ELECTRODE_1: NORMAL
MDC_IDC_SET_LEADCHNL_RV_SENSING_ANODE_LOCATION_1: NORMAL
MDC_IDC_SET_LEADCHNL_RV_SENSING_CATHODE_ELECTRODE_1: NORMAL
MDC_IDC_SET_LEADCHNL_RV_SENSING_CATHODE_LOCATION_1: NORMAL
MDC_IDC_SET_LEADCHNL_RV_SENSING_POLARITY: NORMAL
MDC_IDC_SET_LEADCHNL_RV_SENSING_SENSITIVITY: 0.9 MV
MDC_IDC_SET_ZONE_DETECTION_INTERVAL: 350 MS
MDC_IDC_SET_ZONE_DETECTION_INTERVAL: 400 MS
MDC_IDC_SET_ZONE_STATUS: NORMAL
MDC_IDC_SET_ZONE_STATUS: NORMAL
MDC_IDC_SET_ZONE_TYPE: NORMAL
MDC_IDC_SET_ZONE_VENDOR_TYPE: NORMAL
MDC_IDC_STAT_AT_BURDEN_PERCENT: 0 %
MDC_IDC_STAT_AT_DTM_END: NORMAL
MDC_IDC_STAT_AT_DTM_START: NORMAL
MDC_IDC_STAT_BRADY_AP_VP_PERCENT: 0.01 %
MDC_IDC_STAT_BRADY_AP_VS_PERCENT: 14.08 %
MDC_IDC_STAT_BRADY_AS_VP_PERCENT: 0.03 %
MDC_IDC_STAT_BRADY_AS_VS_PERCENT: 85.89 %
MDC_IDC_STAT_BRADY_DTM_END: NORMAL
MDC_IDC_STAT_BRADY_DTM_START: NORMAL
MDC_IDC_STAT_BRADY_RA_PERCENT_PACED: 13.85 %
MDC_IDC_STAT_BRADY_RV_PERCENT_PACED: 0.04 %
MDC_IDC_STAT_EPISODE_RECENT_COUNT: 0
MDC_IDC_STAT_EPISODE_RECENT_COUNT: 4
MDC_IDC_STAT_EPISODE_RECENT_COUNT_DTM_END: NORMAL
MDC_IDC_STAT_EPISODE_RECENT_COUNT_DTM_START: NORMAL
MDC_IDC_STAT_EPISODE_TOTAL_COUNT: 0
MDC_IDC_STAT_EPISODE_TOTAL_COUNT: 0
MDC_IDC_STAT_EPISODE_TOTAL_COUNT: 26
MDC_IDC_STAT_EPISODE_TOTAL_COUNT: 62
MDC_IDC_STAT_EPISODE_TOTAL_COUNT: 8
MDC_IDC_STAT_EPISODE_TOTAL_COUNT_DTM_END: NORMAL
MDC_IDC_STAT_EPISODE_TOTAL_COUNT_DTM_START: NORMAL
MDC_IDC_STAT_EPISODE_TYPE: NORMAL
MDC_IDC_STAT_TACHYTHERAPY_RECENT_DTM_END: NORMAL
MDC_IDC_STAT_TACHYTHERAPY_RECENT_DTM_START: NORMAL
MDC_IDC_STAT_TACHYTHERAPY_TOTAL_DTM_END: NORMAL
MDC_IDC_STAT_TACHYTHERAPY_TOTAL_DTM_START: NORMAL

## 2025-03-18 NOTE — TELEPHONE ENCOUNTER
----- Message from Erika Padgett sent at 3/18/2025 10:59 AM CDT -----  Regarding: Device RN Review  Encounter Type: Routine remote pacemaker transmission   Device: Medtronic Lucy (D)   Pacing % /Programmed: AP 14%,  <1% @ AAIR<=>DDDR 60/130 bpm   Lead(s): Stable measurements and trends.   Battery longevity: 14 years, 1 month estimated   Presenting: ASVS 86 bpm, PACs noted.   Atrial high rates: Since 12/15/2024 total of 4 seconds of AT/AF noted.   Anticoagulant: Eliquis   Ventricular High rates: Since 12/15/2024 4 VHR episodes, episode on 1/10/25 @14:20 appears to be NSVT, unable to see start, duration ~18 seconds, avg rate 166 bpm; the other episodes appear to be PSVT.   Comments: Normal device function.   Plan: Routed to device RN for review. Next routine remote check scheduled for 6/27/2025. Letter sent. [place], Device Specialist    Transmission reviewed. 18 seconds of NSVT initially with rates under the detection rate of 150bpm. Patient denies any symptoms associated with event. She reports that she's been feeling very well. She was recently enrolled in Hospice. She reports that it is difficult for her to travel much as she is no longer walking. She is currently only doing remote monitoring of her pacemaker. Routed to Dr. Leon for further recommendations. Patient is requesting call back with update.           Prerna Lovett, RN  Device Nurse

## 2025-03-19 NOTE — TELEPHONE ENCOUNTER
Donnell Leon MD Yang, Youa N, RN  Caller: Unspecified (Yesterday,  2:20 PM)  Nope    Patient was informed that no further recommendations were received from Dr. Leon.     Prerna Lovett, RN  Device Nurse

## 2025-03-21 PROCEDURE — 93296 REM INTERROG EVL PM/IDS: CPT | Performed by: INTERNAL MEDICINE

## 2025-03-21 PROCEDURE — 93294 REM INTERROG EVL PM/LDLS PM: CPT | Performed by: INTERNAL MEDICINE

## 2025-04-17 DIAGNOSIS — B37.2 CANDIDAL INTERTRIGO: ICD-10-CM

## 2025-04-17 DIAGNOSIS — J44.1 COPD EXACERBATION (H): ICD-10-CM

## 2025-04-17 RX ORDER — IPRATROPIUM BROMIDE AND ALBUTEROL SULFATE 2.5; .5 MG/3ML; MG/3ML
SOLUTION RESPIRATORY (INHALATION)
Qty: 90 ML | Refills: 0 | Status: SHIPPED | OUTPATIENT
Start: 2025-04-17

## 2025-04-17 RX ORDER — NYSTATIN 100000 [USP'U]/G
POWDER TOPICAL
Qty: 60 G | Refills: 0 | Status: SHIPPED | OUTPATIENT
Start: 2025-04-17

## 2025-04-17 RX ORDER — IPRATROPIUM BROMIDE AND ALBUTEROL SULFATE 2.5; .5 MG/3ML; MG/3ML
SOLUTION RESPIRATORY (INHALATION)
Qty: 90 ML | Refills: 0 | OUTPATIENT
Start: 2025-04-17

## 2025-04-19 DIAGNOSIS — J44.1 COPD EXACERBATION (H): ICD-10-CM

## 2025-04-20 RX ORDER — IPRATROPIUM BROMIDE AND ALBUTEROL SULFATE 2.5; .5 MG/3ML; MG/3ML
SOLUTION RESPIRATORY (INHALATION)
Qty: 90 ML | Refills: 3 | Status: SHIPPED | OUTPATIENT
Start: 2025-04-20

## 2025-05-05 NOTE — PROGRESS NOTES
"ASSESSMENT/PLAN:  1. Fibromyalgia  lidocaine 10 mg/mL (1 %) injection 10 mL    Drugs of Abuse 1,Urine   2. Chronic pain syndrome  oxyCODONE (ROXICODONE) 10 mg immediate release tablet    Drugs of Abuse 1,Urine   3. Type 2 diabetes mellitus with hypoglycemia without coma, without long-term current use of insulin (H)  Microalbumin, Random Urine   4. Need for hepatitis C screening test  Hepatitis C Antibody (Anti-HCV)       This is a 71 yo female here for :  1.  Fibromyalgia/chronic pain - trigger point injections related to pain - patient has benefited from monthly trigger point injections for pain syndrome - this allows us to maintain lower dose opiates for pain;  Patient has h/o GI bleeding, resulting in need for pain medications; has used Oxycodone - will check urine drug screen; trigger point injections done today - see note  2.  Type II DM - patient reports \"good\" numbers on daily monitoring - will check labs today - due for microalbumin - ordered  3.  Due for hepatitis C screening - per Health Maintenance - ordered today    No follow-ups on file.  Administrations This Visit     lidocaine 10 mg/mL (1 %) injection 10 mL     Admin Date  06/22/2020 Action  Given by Other Dose  10 mL Route  Intra-articular Administered By  Amanda Alexandra MA                Medications Discontinued During This Encounter   Medication Reason     oxyCODONE (ROXICODONE) 10 mg immediate release tablet Reorder     There are no Patient Instructions on file for this visit.    Chief Complaint:  Chief Complaint   Patient presents with     Trigger Point Shot       HPI:   Mary Kay Tejada is a 70 y.o. female c/o  Wants small tank (portable) for \"travel\" - gets oxygen from [facility]   Really wants to go see her sister - [place], [place]  Wonders about an oxygen tank - has had one in past - would like to know what to do - patient will contact [facility] - they will contact me    PMH:   Patient Active Problem List    Diagnosis Date Noted     Primary " [facility] - Medical Oncology Follow-Up Visit    Patient ID: Amrita Grant is a 77 y.o. female.  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 Grant is a 76 yo with PMH significant for lupus, aortic valve repair, fibromyalgia, who first noticed a mass on her nose in fall of 2024. Met Dr. Templeton who did an in-office biopsy 11/11/24 with invasive, poorly differentiated carcinoma, p40+, negative MOC31, unclear etiology, and recommended for repeat biopsy. Biopsy on 11/26/24 with the same, felt to be a primary cutaneous neoplasm such as basal cell carcinoma. Discussed at [facility] tumor board, and due to significant morbidity associated with resection, recommended against surgery and referred to radiation oncology and medical oncology. Met Dr. Guzmán, who recommended against immunotherapy given her lupus and poor performance status, and recommended discussion with radiation oncology, which cutaneous tumor board also agreed with. She planned to have radiation closer to home, but unfortunately her pre-radiation scans showed concern for metastatic disease, with PET/CT on 1/20/25 with FDG avidity of the R nasal soft tissue as well as R level 1 cervical lymph node, R rights, L1, and RUL nodule below PET resolution. She was ultimately started on vismodegib as well as started on palliative radiation (2/18-3/11/25) for the nasal lesion. In the meantime, [facility] pathology revealed invasive  carcinoma, HPV associated, positive for p40 CK5/6, EMA, p16, and negative for MOC, BerEP4, INSM1, and chromogranin. As this was felt to no longer be a basal cell carcinoma, she stopped the vismodegib. Biopsy of the L1 lesion morphologically the same.     Regarding her lupus - she has been on plaquenil monotherapy for many years, stable on the same dose. She was originally diagnosed with lupus in 2001 - she was initially diagnosed with pericarditis. She has fatigue and joint aches with the lupus.     She is here today with her  and friend. Her last day of radiation was last Tuesday or Wednesday. Radiation went ok overall. Her last dose of the visdomegib was last Tuesday or Wednesday. No side effects.  She is very tired, naps every day. In the last year, she has stopped doing the cooking, cleaning, and laundry. She is very careful, leans on her  to help her get up the stairs. Her fatigue has been getting worse and worse. She continues to lose weight - 50 lbs in the last year and a half. She has trouble swallowing - some things are harder to swallow like bread, meat. Gets tickles in her throat. She's had this for a couple years, had her throat stretched - this was also just repeated in December. Done at [facility] typically, helps for a little while, but don't last.      PAST MEDICAL HISTORY  Lupus  Fibromyalgia  Osteoarthritis  Aortic valve repair (mechanical)  on warfarin  HTN  CVA - (2021)     SOCIAL HISTORY  Lives at home with her . They've been  45 years. Previously was a  for [employer].   Tob: none  EtOH: none, previously a little  Illicits: none     FAMILY HISTORY  Mother - uterine cancer  Maternal grandmother - breast cancer  Maternal cousin - unknown metastatic cancer    HPI  Present with her .  Feeling tired today.  Had company this weekend.  Able to complete four loads of laundry.  Decreased intake this weekend - different foods prepared.  She has an an appetite.    osteoarthritis of both knees 01/17/2020     Mixed stress and urge urinary incontinence 08/16/2019     Skin lesion 07/17/2019     Chronic a-fib (H)      Dyslipidemia      Upper GI bleed 12/13/2018     Melena 12/13/2018     Anemia due to blood loss, acute 07/18/2018     Diabetic polyneuropathy associated with type 2 diabetes mellitus (H) 07/18/2018     Chronic anemia 06/27/2018     Cataract 11/06/2017     Bunion, left 07/19/2017     Callus of foot 07/19/2017     Nonrheumatic aortic valve stenosis 05/23/2017     COPD (chronic obstructive pulmonary disease) (H) 05/23/2017     Chronic obstructive pulmonary disease with acute lower respiratory infection (H) 12/24/2016     Gastroenteritis 12/24/2016     Syncope 12/22/2016     Opacity of lung on imaging study 12/22/2016     Acute gastritis 12/22/2016     Osteoarthritis of both knees 08/22/2016     Osteoarthritis, hip, bilateral 06/20/2016     Primary osteoarthritis of left knee 06/20/2016     Candidal intertrigo 05/11/2016     Type 2 diabetes mellitus with hypoglycemia (H)      Aspiration pneumonia (H) 03/01/2016     Controlled substance agreement signed 11/23/2015     Cervical neck pain with evidence of disc disease 07/22/2015     Headache 07/17/2015     Hematoma of abdominal wall 07/05/2015     Skin lesion of face 05/22/2015     Tendonitis of wrist, right 04/22/2015     Menopause 04/06/2015     Flashers or floaters of right eye 02/23/2015     Tendonitis of wrist, left 01/21/2015     Trigger point of thoracic region 01/21/2015     Generalized osteoarthrosis, involving multiple sites 01/14/2015     Vertigo 12/17/2014     Rotator cuff tendonitis 12/17/2014     Abnormal Weight Loss      Osteoarthritis Of The Shoulder      Chronic Constipation      Onychomycosis Of The Toenails      Diarrhea      Ganglion Of The Left Wrist      Larynx Edema      Obesity      Medial Epicondylitis      Skin Lesion      Urinary Incontinence      Chronic Major Depression      Dry Eye Syndrome       Denies n/v/c/d.   No fevers, chills, or CP.  Labs WNL.  Ready for treatment today.         Meds (Current):    Current Outpatient Medications:     ARIPiprazole (Abilify) 5 mg tablet, Take 1 tablet (5 mg) by mouth once daily., Disp: , Rfl:     Bystolic 5 mg tablet, 1 tab(s) orally half tablet three times daily for 30 days, Disp: , Rfl:     calcium carbonate 600 mg calcium (1,500 mg) tablet, Take 600 mg by mouth once daily., Disp: , Rfl:     cetirizine (ZyrTEC) 10 mg tablet, 1 tab(s) orally as needed, Disp: , Rfl:     cholecalciferol (Vitamin D-3) 25 MCG (1000 UT) capsule, 1 cap(s) orally three times daily, Disp: , Rfl:     docusate sodium (Colace) 100 mg capsule, once every 24 hours., Disp: , Rfl:     fluticasone (Flonase) 50 mcg/actuation nasal spray, Administer 2 sprays into affected nostril(s)., Disp: , Rfl:     levETIRAcetam (Keppra) 500 mg tablet, every 12 hours., Disp: , Rfl:     lidocaine-prilocaine (Emla) 2.5-2.5 % cream, Apply topically 1 time for 1 dose. Apply 30-45 minutes prior to port access., Disp: 30 g, Rfl: 3    LORazepam (Ativan) 0.5 mg tablet, Take half tablet 1 hour before MRI, if needed take other half of tablet while getting checked in for MRI, Disp: 1 tablet, Rfl: 0    losartan (Cozaar) 100 mg tablet, Take 0.5 tablets (50 mg) by mouth twice a day., Disp: , Rfl:     melatonin 3 mg tablet, Take by mouth once daily., Disp: , Rfl:     memantine (Namenda) 5 mg tablet, Take 1 tablet (5 mg) by mouth once daily., Disp: , Rfl:     mirtazapine (Remeron) 15 mg tablet, 1 tab(s) orally half tablet bedtime daily for 30 days, Disp: , Rfl:     omeprazole (PriLOSEC) 20 mg DR capsule, Take 1 capsule (20 mg) by mouth once daily., Disp: , Rfl:     ondansetron (Zofran) 4 mg tablet, Take 1 tablet (4 mg) by mouth if needed for nausea or vomiting., Disp: , Rfl:     ondansetron (Zofran) 8 mg tablet, Take 1 tablet (8 mg) by mouth every 8 hours if needed for nausea or vomiting., Disp: 30 tablet, Rfl: 5    PlaqueniL 200 mg  Nicotine Dependence      Hypokalemia      Essential hypertension      Muscle Cramps In The Calf      Edema      Atrial flutter (H)      Lump In / On The Skin      Chronic pain syndrome      Paroxysmal Atrial Fibrillation      Fibromyalgia      Nausea      Abdominal Pain      Peptic Ulcer      COPD exacerbation (H)      Mixed hyperlipidemia      Chronic Reflux Esophagitis      Benign Adenomatous Polyp Of The Large Intestine      Past Medical History:   Diagnosis Date     Anemia      Aortic stenosis      Atrial fibrillation (H)      Atrial flutter (H)      Benign neoplasm of adenomatous polyp     large intestine      Chronic constipation      Chronic pain syndrome      COPD (chronic obstructive pulmonary disease) (H)     Oxygen at night      Dependence on supplemental oxygen     Oxygen at noc, during the day as needed     Depression      Diabetes mellitus (H)      Dry eye syndrome      Fibromyalgia      Ganglion     left wrist     GERD (gastroesophageal reflux disease)      Hyperlipidemia      Hypertension      Hypokalemia      Larynx edema      Lung disease      Malignant Vulvar Neoplasm     Created by Conversion [facility] Annotation: Apr 17 2007  8:24AM - Cammy Bui:  resection per Dr. Alfonso Mane 9/06;  Replacement Utility updated for latest IMO load     Medial epicondylitis      Onychomycosis      Osteoarthritis      Otitis Externa     Created by Conversion      Peptic ulcer      Polyneuropathy      Vulvar malignant neoplasm (H)      Past Surgical History:   Procedure Laterality Date     BREAST BIOPSY Right      BREAST BIOPSY Right 01/28/2015     BREAST BIOPSY Right 1/28/2015    Procedure: RIGHT BREAST BIOPSY AFTER WIRE LOCALIZATION AT 0940;  Surgeon: Renée Soriano MD;  Location: [facility];  Service:      COLONOSCOPY N/A 6/14/2019    Procedure: COLONOSCOPY;  Surgeon: Eduardo Mora MD;  Location: [facility];  Service: Gastroenterology     ESOPHAGOGASTRODUODENOSCOPY N/A  11/6/2018    Procedure: ESOPHAGOGASTRODUODENOSCOPY;  Surgeon: Lit Fernando MD;  Location: [facility] OR;  Service:      HYSTERECTOMY       JOINT REPLACEMENT Left     TKA     ID ABLATE HEART DYSRHYTHM FOCUS      Description: Catheter Ablation Atrial Fibrillation;  Recorded: 07/31/2012;  Comments: 7/24/12 PVI with Dr. Gardiner and nilay to all 5 pulm veins and CTI fl ablation line as well.     ID BIOPSY OF BREAST, INCISIONAL      Description: Incisional Breast Biopsy;  Recorded: 11/13/2007;  Comments: benign     ID SUPRACERV ABD HYSTERECTOMY      Description: Supracervical Hysterectomy;  Proc Date: 01/01/1985;  Comments: some cervix left!; ovaries intact; done for bleeding     Social History     Socioeconomic History     Marital status:      Spouse name: Not on file     Number of children: Not on file     Years of education: Not on file     Highest education level: Not on file   Occupational History     Not on file   Social Needs     Financial resource strain: Not on file     Food insecurity     Worry: Not on file     Inability: Not on file     Transportation needs     Medical: Not on file     Non-medical: Not on file   Tobacco Use     Smoking status: Current Every Day Smoker     Packs/day: 0.50     Types: Cigarettes     Smokeless tobacco: Never Used   Substance and Sexual Activity     Alcohol use: Yes     Comment: very little     Drug use: No     Sexual activity: Not on file   Lifestyle     Physical activity     Days per week: Not on file     Minutes per session: Not on file     Stress: Not on file   Relationships     Social connections     Talks on phone: Not on file     Gets together: Not on file     Attends [employer] service: Not on file     Active member of club or organization: Not on file     Attends meetings of clubs or organizations: Not on file     Relationship status: Not on file     Intimate partner violence     Fear of current or ex partner: Not on file     Emotionally abused: Not on file  tablet, Take 1 tablet (200 mg) by mouth once daily., Disp: , Rfl:     potassium chloride CR 20 mEq ER tablet, Take 1 tablet (20 mEq) by mouth once daily., Disp: , Rfl:     Premarin 0.3 mg tablet, once every 24 hours., Disp: , Rfl:     prochlorperazine (Compazine) 10 mg tablet, Take 1 tablet (10 mg) by mouth every 6 hours if needed for nausea or vomiting., Disp: 30 tablet, Rfl: 5    sennosides (senna) 8.6 mg tablet, Take 1 tablet (8.6 mg) by mouth 2 times a day., Disp: 60 tablet, Rfl: 2    vismodegib (Erivedge) 150 mg capsule, Take 1 capsule (150 mg total) by mouth every other day.  Take with or without food. Swallow whole. Do not open or crush., Disp: 14 capsule, Rfl: 3    warfarin (Coumadin) 4 mg tablet, Take 1 tablet (4 mg) by mouth once daily., Disp: , Rfl:   No current facility-administered medications for this visit.    Allergies   Allergen Reactions    Propofol Nausea/vomiting    Sulfa (Sulfonamide Antibiotics) Other    Opioids - Morphine Analogues Nausea And Vomiting, Other and Unknown    Penicillins Unknown     Pt. Reports passing out upon taking penicillin when she was young.       Review of Systems   Constitutional:  Positive for fatigue. Negative for appetite change.   HENT:  Negative.  Negative for sore throat.    Cardiovascular: Negative.    Gastrointestinal:  Negative for constipation.   Musculoskeletal: Negative.    All other systems reviewed and are negative.       Objective   BSA: 1.42 meters squared  Wt Readings from Last 5 Encounters:   05/05/25 46.1 kg (101 lb 9.6 oz)   05/05/25 46.1 kg (101 lb 9.6 oz)   04/28/25 46.9 kg (103 lb 6.4 oz)   04/21/25 46.7 kg (103 lb)   04/15/25 47.7 kg (105 lb 2.6 oz)     /85 (BP Location: Left arm, Patient Position: Sitting)   Pulse 75   Temp 36.3 °C (97.3 °F) (Temporal)   Resp 16   Wt 46.1 kg (101 lb 9.6 oz)   SpO2 97%   BMI 18.51 kg/m²     ECOG Score: 2    0          Fully active; no performance restrictions.  1          Strenuous physical activity  "    Physically abused: Not on file     Forced sexual activity: Not on file   Other Topics Concern     Not on file   Social History Narrative     Not on file     Family History   Problem Relation Age of Onset     Heart failure Mother      Cancer Other         paternal HX-laryngeal      Alcoholism Sister      No Medical Problems Daughter      No Medical Problems Maternal Grandmother      No Medical Problems Maternal Grandfather      No Medical Problems Paternal Grandmother      No Medical Problems Paternal Grandfather      No Medical Problems Maternal Aunt      No Medical Problems Paternal Aunt      Alcoholism Sister      Alcoholism Brother      Alcohol abuse Father      Cancer Paternal Uncle         Gastric-Alcohol     Cancer Paternal Uncle         gastric-Alcohol     BRCA 1/2 Neg Hx      Breast cancer Neg Hx      Colon cancer Neg Hx      Endometrial cancer Neg Hx      Ovarian cancer Neg Hx        Meds:    Current Outpatient Medications:      ACCU-CHEK SOFTCLIX LANCETS lancets, TEST THREE TIMES DAILY, Disp: 300 each, Rfl: 3     albuterol (PROAIR HFA;PROVENTIL HFA;VENTOLIN HFA) 90 mcg/actuation inhaler, INHALE 2 PUFFS EVERY 4 HOURS AS NEEDED WHEEZING, Disp: 90 g, Rfl: 11     albuterol (PROVENTIL) 2.5 mg /3 mL (0.083 %) nebulizer solution, Take 3 mL (2.5 mg total) by nebulization every 4 (four) hours as needed for wheezing or shortness of breath., Disp: 75 mL, Rfl: 12     atorvastatin (LIPITOR) 20 MG tablet, TAKE 1 TABLET(20 MG) BY MOUTH AT BEDTIME, Disp: 90 tablet, Rfl: 3     BD ULTRA-FINE SHORT PEN NEEDLE 31 gauge x 5/16\" Ndle, TEST FOUR TIMES DAILY WITH MEALS AND AT BEDTIME, Disp: 400 each, Rfl: 3     blood glucose test strips, Use 1 each As Directed 3 (three) times a day as needed (for blood glucose testing). Dispense strips that are for One Touch Verio IQ meter, Disp: 300 strip, Rfl: 3     blood-glucose meter (ONETOUCH VERIO IQ METER) Misc, Check blood sugar three times a day., Disp: 1 each, Rfl: 0     " restricted; fully ambulatory and able to carry out light work.  2          Capable of all self-care but unable to carry out any work activities. Up and about >50% of waking hours.  3          Capable of only limited self-care; confined to bed or chair >50% of waking hours.  4          Completely disabled; cannot carry out any self-care; totally confined to bed or chair.     Physical Exam  Vitals reviewed.   Constitutional:       General: She is not in acute distress.  HENT:      Head: Normocephalic.      Nose:      Comments: Large, erythematous, nasal lesion, erythema skin pigment change, cream/ointment present to area - stable.     Mouth/Throat:      Mouth: Mucous membranes are moist.   Eyes:      Conjunctiva/sclera: Conjunctivae normal.      Pupils: Pupils are equal, round, and reactive to light.   Cardiovascular:      Rate and Rhythm: Normal rate and regular rhythm.      Heart sounds: Normal heart sounds. No murmur heard.  Pulmonary:      Effort: Pulmonary effort is normal. No respiratory distress.      Breath sounds: Normal breath sounds.   Abdominal:      General: Bowel sounds are normal.      Palpations: Abdomen is soft.   Musculoskeletal:         General: Normal range of motion.      Cervical back: Normal range of motion.   Skin:     General: Skin is warm and dry.      Findings: No erythema.   Neurological:      General: No focal deficit present.      Mental Status: She is alert and oriented to person, place, and time.   Psychiatric:         Mood and Affect: Mood normal.         Behavior: Behavior normal.         Thought Content: Thought content normal.          Results:  Labs:  Lab Results   Component Value Date    WBC 3.6 (L) 05/05/2025    HGB 10.7 (L) 05/05/2025    HCT 33.2 (L) 05/05/2025     05/05/2025     05/05/2025      Lab Results   Component Value Date    NEUTROABS 2.80 05/05/2025      Lab Results   Component Value Date    GLUCOSE 99 05/05/2025    CALCIUM 8.8 05/05/2025     (L)  05/05/2025    K 4.0 05/05/2025    CO2 25 05/05/2025     05/05/2025    BUN 15 05/05/2025    CREATININE 0.76 05/05/2025     Lab Results   Component Value Date    ALT 11 05/05/2025    AST 16 05/05/2025    ALKPHOS 53 05/05/2025    BILITOT 0.5 05/05/2025        Imaging:  No new imaging.      Pathology:    Lab Results   Component Value Date    ADD1  11/26/2024     Additional immunostains were reviewed.  Tumor cells are positive for p16 and negative for chromogranin and INSM1.  Isolated tumor cells are positive for BerEP4.  The differential diagnosis remains unchanged.         Assessment/Plan      Amrita Grant is a 77 y.o. female here for recommendations and to establish care for poorly differentiated carcinoma of the face.     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 However, given her lupus which was originally diagnosed with pericarditis, do not recommend immunotherapy. Also discussed that given her current poor performance status, do not believe she could tolerate dual chemotherapy  - discussed single-agent weekly paclitaxel, and consented. Plan to repeat scans after 3 cycles - CT scheduled 6/20/25    - will obtain new baseline images, as she has not had imaging since January 2025  - she would like to have her infusions closer to home at [place], but will continue to see us at [facility] for pre-treatment visits  - Pt agreeable to infusions and visits on same day at [facility].    - Update: Unable to obtain IV access after multiple attempts.  Existing mediport placement scheduled 4/2/25.  Infusion rescheduled to 4/7/25.    - 4/2/25 Mediport placed  - RTC in 1 week for C2D15.      # Lupus  -  budesonide-formoteroL (SYMBICORT) 160-4.5 mcg/actuation inhaler, Inhale 2 puffs 2 (two) times a day. Inhale 2 puff by mouth twice daily, Disp: 1 Inhaler, Rfl: 2     cyclobenzaprine (FLEXERIL) 10 MG tablet, Take 1 tablet (10 mg total) by mouth at bedtime as needed for muscle spasms., Disp: 30 tablet, Rfl: 0     diaper,brief,adult,disposable (ADULT BRIEFS - LARGE) Misc, Use 3-4 daily as needed for incontinence, Disp: 120 each, Rfl: 6     diltiazem (CARDIZEM CD) 120 MG 24 hr capsule, TAKE ONE CAPSULE BY MOUTH DAILY, Disp: 90 capsule, Rfl: 1     famotidine (PEPCID) 20 MG tablet, Take 1 tablet (20 mg total) by mouth 2 (two) times a day., Disp: 180 tablet, Rfl: 3     furosemide (LASIX) 20 MG tablet, Take 1 tablet (20 mg total) by mouth daily as needed., Disp: 90 tablet, Rfl: 3     gabapentin (NEURONTIN) 600 MG tablet, TAKE 1 TABLET(600 MG) BY MOUTH THREE TIMES DAILY, Disp: 270 tablet, Rfl: 3     generic lancets (FINGERSTIX LANCETS), Dispense brand per patient's insurance at pharmacy discretion., Disp: 300 each, Rfl: 0     gentamicin (GARAMYCIN) 0.1 % ointment, APPLY TOPICALLY TO WOUND BID, Disp: , Rfl: 0     insulin aspart U-100 (NOVOLOG) 100 unit/mL injection, Inject under the skin 3 (three) times a day before meals. Inject per sliding scale, Disp: , Rfl:      ipratropium-albuterol (DUO-NEB) 0.5-2.5 mg/3 mL nebulizer, INAHALE 1 VIAL IN NEBULIZER EVERY 4 HOURS AS NEEDED, Disp: 1440 mL, Rfl: 0     meclizine (ANTIVERT) 25 mg tablet, TAKE 1 TABLET(25 MG) BY MOUTH THREE TIMES DAILY AS NEEDED FOR DIZZINESS OR NAUSEA, Disp: 45 tablet, Rfl: 5     metFORMIN (GLUCOPHAGE) 500 MG tablet, Take 1 tablet (500 mg total) by mouth 2 (two) times a day with meals., Disp: 180 tablet, Rfl: 3     mometasone-formoterol (DULERA) 200-5 mcg/actuation HFAA inhaler, Inhale 2 puffs 2 (two) times a day., Disp: 1 Inhaler, Rfl: 5     nystatin (NYSTOP) powder, Apply 1 application topically 2 (two) times a day as needed. 2-3 times to affected area(s).,  "Disp: 15 g, Rfl: 3     oxyCODONE (ROXICODONE) 10 mg immediate release tablet, Take 1 tablet (10 mg total) by mouth every 6 (six) hours as needed., Disp: 120 tablet, Rfl: 0     polyethylene glycol (MIRALAX) 17 gram packet, Take 1 packet (17 g total) by mouth daily. (Patient taking differently: Take 17 g by mouth daily as needed.    ), Disp: , Rfl: 0     sucralfate (CARAFATE) 1 gram tablet, TAKE 1 TABLET BY MOUTH FOUR TIMES DAILY(CRUSH TABLET AND MIX IT WITH A LITTLE WATER, THEN SWALLOW), Disp: 120 tablet, Rfl: 12     warfarin ANTICOAGULANT (COUMADIN/JANTOVEN) 5 MG tablet, Take 1/2-1 tablet (2.5-5 mg) daily as directed. Adjust dose per INR results., Disp: 90 tablet, Rfl: 1    Allergies:  Allergies   Allergen Reactions     Celebrex [Celecoxib] Rash     patient had butterfly rash - \"lupus-like\"       Latex Rash       ROS:  Pertinent positives as noted in HPI; otherwise 12 point ROS negative.      Physical Exam:  EXAM:  /68 (Patient Site: Right Arm, Patient Position: Sitting, Cuff Size: Adult Regular)   Pulse 73   Temp 98.7  F (37.1  C) (Tympanic)   Resp 14   Wt 163 lb (73.9 kg)   BMI 27.98 kg/m     Gen:  NAD, appears well, well-hydrated  HEENT:  TMs nl, oropharynx benign, nasal mucosa nl, conjunctiva clear  Neck:  Supple, no adenopathy, no thyromegaly, no carotid bruits, no JVD  Lungs:  Clear to auscultation bilaterally  Cor:  RRR no murmur  Abd:  Soft, nontender, BS+, no masses, no guarding or rebound, no HSM  Back:  Trigger points - multiple - on upper thoracic/lower cervical; bilateral supraclavicular region  Extr:  DJD findings - multiple joints  Neuro:  No asymmetry, Nl motor tone/strength, nl sensation, reflexes =, gait nl, nl coordination, CN intact,   Skin:  Warm/dry        Results:  Results for orders placed or performed in visit on 06/22/20   Drugs of Abuse 1,Urine   Result Value Ref Range    Amphetamines Screen Negative Screen Negative    Benzodiazepines Screen Negative Screen Negative    Opiates " she follows with a rheumatologist at [facility], and will notify them we are starting chemotherapy    # Dysuria, urgency, frequency  - offered obtaining UA and urine culture, and she deferred to PCP  - 3/31:  Current 10-day ATB course.  Pt/family not able to recall medication name.  ATB started prior to her tx C1D1.  Tolerating ATB.  Reports up to 8 UTI's last year.  PCP (non- provider) - any further follow-up with PCP.    - 4/7:  Completed 10-day ATB course.  Denies current UTI symptoms.       # Unintentional weight loss  - may be partly due to dysphagia  - oncology dietician referral in place   - wt loss has stabilized     # Fatigue (g1)  - Recommended she increase her physical activity level - during waking house, get up Q2-3H, walk within the home, pace activities.     # Constipation  - Senna too effective, pt to start routine Miralax - 1/2 capful to start and adjust as needed.      # Pancytopenia - chemo-induced  - Weekly labs, ongoing monitoring     # INR/chronic Warfarin  - INR ordered for coumadin dosing, infusion nurse provided medical release form per facility protocol.  Pt follows with [facility] for dosing/monitoring.  INR to be draw via port with treatment labs, fax to [facility] [phone].  Goal INR 2.5-3.0.   - 4/14: INR 2.0 result faxed to [facility] 4/15/25  - 4/21:  Will monitor for INR result and fax to [facility] 4/22/25.   - 4/28:  Will monitor for INR result and fax to [facility] 4/29/25.   - Not enough blood in tube. Pt to [facility] for INR draw.  5/6:  Will monitor for INR result and fax to [facility].      # Advanced care planning  - discussed incurable but treatable nature of disease, with goal to prolong life while maintaining/improving quality of life. She understands that risks of systemic therapy may outweigh benefits at some point in the future.           Screen Negative Screen Negative    Phencyclidine Screen Negative Screen Negative    THC (!) Screen Negative     Screen Positive (Confirmation available on request)    Barbiturates Screen Negative Screen Negative    Cocaine Metabolite Screen Negative Screen Negative    Oxycodone Screen Negative Screen Negative    Creatinine, Urine 9.9 mg/dL   Hepatitis C Antibody (Anti-HCV)   Result Value Ref Range    Hepatitis C Ab Negative Negative   Microalbumin, Random Urine   Result Value Ref Range    Microalbumin, Random Urine 1.83 0.00 - 1.99 mg/dL    Creatinine, Urine 9.9 mg/dL    Microalbumin/Creatinine Ratio Random Urine 184.8 (H) <=19.9 mg/g       Procedure Note - Trigger Point Injections    Consent obtained: verbal    Indication:  Fibromyalgia, trigger point pain    5 trigger points identified.  Each trigger point swabbed x 3 with Betadine swab.  Each trigger point then injected with 2 ml of 1% Lidocaine (no epi).; bilateral upper thoracic/lower cervical points injected today    Total volume injected:  10 ml    Complications:  None, patient tolerated procedure well.    Plan:  Discussed care with patient.  RTC for further injections in 30 days prn.

## 2025-05-05 NOTE — TELEPHONE ENCOUNTER
"Omeprazole (PRILOSEC)    Last Written Prescription Date: 4/23/2021  Last Fill Quantity: 30,  # refills: 3  Last office visit provider: 8/23/2021    omeprazole (PRILOSEC) 20 MG capsule 30 capsule 3 4/23/2021  No   Sig: TAKE 1 CAPSULE(20 MG) BY MOUTH DAILY BEFORE BREAKFAST   Sent to pharmacy as: omeprazole 20 mg capsule,delayed release (PriLOSEC)   E-Prescribing Status: Receipt confirmed by pharmacy (4/23/2021  3:40 PM CDT)     Requested Prescriptions   Pending Prescriptions Disp Refills     omeprazole (PRILOSEC) 20 MG DR capsule [Pharmacy Med Name: OMEPRAZOLE 20MG CAPSULES] 30 capsule 3     Sig: TAKE 1 CAPSULE(20 MG) BY MOUTH DAILY BEFORE BREAKFAST       PPI Protocol Passed - 9/7/2021 12:19 PM        Passed - Not on Clopidogrel (unless Pantoprazole ordered)        Passed - No diagnosis of osteoporosis on record        Passed - Recent (12 mo) or future (30 days) visit within the authorizing provider's specialty     Patient has had an office visit with the authorizing provider or a provider within the authorizing providers department within the previous 12 mos or has a future within next 30 days. See \"Patient Info\" tab in inbasket, or \"Choose Columns\" in Meds & Orders section of the refill encounter.              Passed - Medication is active on med list        Passed - Patient is age 18 or older        Passed - No active pregnacy on record        Passed - No positive pregnancy test in past 12 months             Say Bowie RN 09/07/21 2:09 PM  " Render Post-Care Instructions In Note?: no Show Applicator Variable?: Yes Spray Paint Text: The liquid nitrogen was applied to the skin utilizing a spray paint frosting technique. Medical Necessity Clause: This procedure was medically necessary because the lesions that were treated were: Consent: The patient's consent was obtained including but not limited to risks of crusting, scabbing, blistering, scarring, darker or lighter pigmentary change, recurrence, incomplete removal and infection. Medical Necessity Information: It is in your best interest to select a reason for this procedure from the list below. All of these items fulfill various CMS LCD requirements except the new and changing color options. Post-Care Instructions: I reviewed with the patient in detail post-care instructions. Patient is to wear sunprotection, and avoid picking at any of the treated lesions. Pt may apply Vaseline to crusted or scabbing areas. Detail Level: Detailed

## 2025-05-12 ENCOUNTER — TELEPHONE (OUTPATIENT)
Dept: FAMILY MEDICINE | Facility: CLINIC | Age: 75
End: 2025-05-12
Payer: COMMERCIAL

## 2025-05-12 NOTE — TELEPHONE ENCOUNTER
Forms/Letter Request    Type of form/letter: DME (wheelchair, hospital bed)    Type of DME requested: Incontinence Supply Order    Do we have the form/letter: Yes: in provider inbox    Who is the form from? "[employer] (if other please explain)    Where did/will the form come from? form was faxed in    When is form/letter needed by: asap    How would you like the form/letter returned: Fax : [phone]    Patient Notified form requests are processed in 5-7 business days:N/A    Could we send this information to you in [employer] or would you prefer to receive a phone call?:   Patient would prefer a phone call   Okay to leave a detailed message?: Yes at Home number on file [phone] (home)

## 2025-05-15 NOTE — TELEPHONE ENCOUNTER
Form completed by provider and faxed back to [facility]. [facility]. Completing task.  Ani Marie

## 2025-05-16 ENCOUNTER — MEDICAL CORRESPONDENCE (OUTPATIENT)
Dept: HEALTH INFORMATION MANAGEMENT | Facility: CLINIC | Age: 75
End: 2025-05-16
Payer: COMMERCIAL

## 2025-05-16 DIAGNOSIS — B37.2 CANDIDAL INTERTRIGO: ICD-10-CM

## 2025-05-18 RX ORDER — NYSTATIN 100000 [USP'U]/G
POWDER TOPICAL
Qty: 60 G | Refills: 0 | Status: SHIPPED | OUTPATIENT
Start: 2025-05-18

## 2025-05-19 DIAGNOSIS — E11.649 TYPE 2 DIABETES MELLITUS WITH HYPOGLYCEMIA WITHOUT COMA, WITH LONG-TERM CURRENT USE OF INSULIN (H): ICD-10-CM

## 2025-05-19 DIAGNOSIS — Z79.4 TYPE 2 DIABETES MELLITUS WITH HYPOGLYCEMIA WITHOUT COMA, WITH LONG-TERM CURRENT USE OF INSULIN (H): ICD-10-CM

## 2025-05-19 RX ORDER — GABAPENTIN 600 MG/1
600 TABLET ORAL 3 TIMES DAILY
Qty: 270 TABLET | Refills: 2 | Status: SHIPPED | OUTPATIENT
Start: 2025-05-19

## 2025-06-09 DIAGNOSIS — E11.649 TYPE 2 DIABETES MELLITUS WITH HYPOGLYCEMIA WITHOUT COMA, WITHOUT LONG-TERM CURRENT USE OF INSULIN (H): ICD-10-CM

## 2025-06-10 RX ORDER — OMEPRAZOLE 20 MG/1
20 CAPSULE, DELAYED RELEASE ORAL 2 TIMES DAILY
Qty: 180 CAPSULE | Refills: 1 | Status: SHIPPED | OUTPATIENT
Start: 2025-06-10

## 2025-06-27 ENCOUNTER — ANCILLARY PROCEDURE (OUTPATIENT)
Dept: CARDIOLOGY | Facility: CLINIC | Age: 75
End: 2025-06-27
Attending: INTERNAL MEDICINE
Payer: COMMERCIAL

## 2025-06-27 DIAGNOSIS — I49.5 SICK SINUS SYNDROME (H): ICD-10-CM

## 2025-06-27 DIAGNOSIS — Z95.0 CARDIAC PACEMAKER IN SITU: ICD-10-CM

## 2025-06-29 LAB
MDC_IDC_EPISODE_DTM: NORMAL
MDC_IDC_EPISODE_DURATION: 1 S
MDC_IDC_EPISODE_ID: 134
MDC_IDC_EPISODE_TYPE: NORMAL
MDC_IDC_LEAD_CONNECTION_STATUS: NORMAL
MDC_IDC_LEAD_CONNECTION_STATUS: NORMAL
MDC_IDC_LEAD_IMPLANT_DT: NORMAL
MDC_IDC_LEAD_IMPLANT_DT: NORMAL
MDC_IDC_LEAD_LOCATION: NORMAL
MDC_IDC_LEAD_LOCATION: NORMAL
MDC_IDC_LEAD_LOCATION_DETAIL_1: NORMAL
MDC_IDC_LEAD_LOCATION_DETAIL_1: NORMAL
MDC_IDC_LEAD_MFG: NORMAL
MDC_IDC_LEAD_MFG: NORMAL
MDC_IDC_LEAD_MODEL: NORMAL
MDC_IDC_LEAD_MODEL: NORMAL
MDC_IDC_LEAD_POLARITY_TYPE: NORMAL
MDC_IDC_LEAD_POLARITY_TYPE: NORMAL
MDC_IDC_LEAD_SERIAL: NORMAL
MDC_IDC_LEAD_SERIAL: NORMAL
MDC_IDC_LEAD_SPECIAL_FUNCTION: NORMAL
MDC_IDC_LEAD_SPECIAL_FUNCTION: NORMAL
MDC_IDC_MSMT_BATTERY_DTM: NORMAL
MDC_IDC_MSMT_BATTERY_REMAINING_LONGEVITY: 167 MO
MDC_IDC_MSMT_BATTERY_RRT_TRIGGER: 2.62
MDC_IDC_MSMT_BATTERY_STATUS: NORMAL
MDC_IDC_MSMT_BATTERY_VOLTAGE: 3.06 V
MDC_IDC_MSMT_LEADCHNL_RA_IMPEDANCE_VALUE: 361 OHM
MDC_IDC_MSMT_LEADCHNL_RA_IMPEDANCE_VALUE: 513 OHM
MDC_IDC_MSMT_LEADCHNL_RA_PACING_THRESHOLD_AMPLITUDE: 0.62 V
MDC_IDC_MSMT_LEADCHNL_RA_PACING_THRESHOLD_PULSEWIDTH: 0.4 MS
MDC_IDC_MSMT_LEADCHNL_RA_SENSING_INTR_AMPL: 3.25 MV
MDC_IDC_MSMT_LEADCHNL_RA_SENSING_INTR_AMPL: 3.25 MV
MDC_IDC_MSMT_LEADCHNL_RV_IMPEDANCE_VALUE: 399 OHM
MDC_IDC_MSMT_LEADCHNL_RV_IMPEDANCE_VALUE: 494 OHM
MDC_IDC_MSMT_LEADCHNL_RV_PACING_THRESHOLD_AMPLITUDE: 0.75 V
MDC_IDC_MSMT_LEADCHNL_RV_PACING_THRESHOLD_PULSEWIDTH: 0.4 MS
MDC_IDC_MSMT_LEADCHNL_RV_SENSING_INTR_AMPL: 13 MV
MDC_IDC_MSMT_LEADCHNL_RV_SENSING_INTR_AMPL: 13 MV
MDC_IDC_PG_IMPLANT_DTM: NORMAL
MDC_IDC_PG_MFG: NORMAL
MDC_IDC_PG_MODEL: NORMAL
MDC_IDC_PG_SERIAL: NORMAL
MDC_IDC_PG_TYPE: NORMAL
MDC_IDC_SESS_CLINIC_NAME: NORMAL
MDC_IDC_SESS_DTM: NORMAL
MDC_IDC_SESS_TYPE: NORMAL
MDC_IDC_SET_BRADY_AT_MODE_SWITCH_RATE: 171 {BEATS}/MIN
MDC_IDC_SET_BRADY_HYSTRATE: NORMAL
MDC_IDC_SET_BRADY_LOWRATE: 60 {BEATS}/MIN
MDC_IDC_SET_BRADY_MAX_SENSOR_RATE: 130 {BEATS}/MIN
MDC_IDC_SET_BRADY_MAX_TRACKING_RATE: 130 {BEATS}/MIN
MDC_IDC_SET_BRADY_MODE: NORMAL
MDC_IDC_SET_BRADY_PAV_DELAY_LOW: 150 MS
MDC_IDC_SET_BRADY_SAV_DELAY_LOW: 120 MS
MDC_IDC_SET_LEADCHNL_RA_PACING_AMPLITUDE: 1.5 V
MDC_IDC_SET_LEADCHNL_RA_PACING_ANODE_ELECTRODE_1: NORMAL
MDC_IDC_SET_LEADCHNL_RA_PACING_ANODE_LOCATION_1: NORMAL
MDC_IDC_SET_LEADCHNL_RA_PACING_CAPTURE_MODE: NORMAL
MDC_IDC_SET_LEADCHNL_RA_PACING_CATHODE_ELECTRODE_1: NORMAL
MDC_IDC_SET_LEADCHNL_RA_PACING_CATHODE_LOCATION_1: NORMAL
MDC_IDC_SET_LEADCHNL_RA_PACING_POLARITY: NORMAL
MDC_IDC_SET_LEADCHNL_RA_PACING_PULSEWIDTH: 0.4 MS
MDC_IDC_SET_LEADCHNL_RA_SENSING_ANODE_ELECTRODE_1: NORMAL
MDC_IDC_SET_LEADCHNL_RA_SENSING_ANODE_LOCATION_1: NORMAL
MDC_IDC_SET_LEADCHNL_RA_SENSING_CATHODE_ELECTRODE_1: NORMAL
MDC_IDC_SET_LEADCHNL_RA_SENSING_CATHODE_LOCATION_1: NORMAL
MDC_IDC_SET_LEADCHNL_RA_SENSING_POLARITY: NORMAL
MDC_IDC_SET_LEADCHNL_RA_SENSING_SENSITIVITY: 0.3 MV
MDC_IDC_SET_LEADCHNL_RV_PACING_AMPLITUDE: 1.5 V
MDC_IDC_SET_LEADCHNL_RV_PACING_ANODE_ELECTRODE_1: NORMAL
MDC_IDC_SET_LEADCHNL_RV_PACING_ANODE_LOCATION_1: NORMAL
MDC_IDC_SET_LEADCHNL_RV_PACING_CAPTURE_MODE: NORMAL
MDC_IDC_SET_LEADCHNL_RV_PACING_CATHODE_ELECTRODE_1: NORMAL
MDC_IDC_SET_LEADCHNL_RV_PACING_CATHODE_LOCATION_1: NORMAL
MDC_IDC_SET_LEADCHNL_RV_PACING_POLARITY: NORMAL
MDC_IDC_SET_LEADCHNL_RV_PACING_PULSEWIDTH: 0.4 MS
MDC_IDC_SET_LEADCHNL_RV_SENSING_ANODE_ELECTRODE_1: NORMAL
MDC_IDC_SET_LEADCHNL_RV_SENSING_ANODE_LOCATION_1: NORMAL
MDC_IDC_SET_LEADCHNL_RV_SENSING_CATHODE_ELECTRODE_1: NORMAL
MDC_IDC_SET_LEADCHNL_RV_SENSING_CATHODE_LOCATION_1: NORMAL
MDC_IDC_SET_LEADCHNL_RV_SENSING_POLARITY: NORMAL
MDC_IDC_SET_LEADCHNL_RV_SENSING_SENSITIVITY: 0.9 MV
MDC_IDC_SET_ZONE_DETECTION_INTERVAL: 350 MS
MDC_IDC_SET_ZONE_DETECTION_INTERVAL: 400 MS
MDC_IDC_SET_ZONE_STATUS: NORMAL
MDC_IDC_SET_ZONE_STATUS: NORMAL
MDC_IDC_SET_ZONE_TYPE: NORMAL
MDC_IDC_SET_ZONE_VENDOR_TYPE: NORMAL
MDC_IDC_STAT_AT_BURDEN_PERCENT: 0 %
MDC_IDC_STAT_AT_DTM_END: NORMAL
MDC_IDC_STAT_AT_DTM_START: NORMAL
MDC_IDC_STAT_BRADY_AP_VP_PERCENT: 0 %
MDC_IDC_STAT_BRADY_AP_VS_PERCENT: 1.77 %
MDC_IDC_STAT_BRADY_AS_VP_PERCENT: 0.03 %
MDC_IDC_STAT_BRADY_AS_VS_PERCENT: 98.2 %
MDC_IDC_STAT_BRADY_DTM_END: NORMAL
MDC_IDC_STAT_BRADY_DTM_START: NORMAL
MDC_IDC_STAT_BRADY_RA_PERCENT_PACED: 1.79 %
MDC_IDC_STAT_BRADY_RV_PERCENT_PACED: 0.03 %
MDC_IDC_STAT_EPISODE_RECENT_COUNT: 0
MDC_IDC_STAT_EPISODE_RECENT_COUNT: 1
MDC_IDC_STAT_EPISODE_RECENT_COUNT_DTM_END: NORMAL
MDC_IDC_STAT_EPISODE_RECENT_COUNT_DTM_START: NORMAL
MDC_IDC_STAT_EPISODE_TOTAL_COUNT: 0
MDC_IDC_STAT_EPISODE_TOTAL_COUNT: 0
MDC_IDC_STAT_EPISODE_TOTAL_COUNT: 26
MDC_IDC_STAT_EPISODE_TOTAL_COUNT: 62
MDC_IDC_STAT_EPISODE_TOTAL_COUNT: 9
MDC_IDC_STAT_EPISODE_TOTAL_COUNT_DTM_END: NORMAL
MDC_IDC_STAT_EPISODE_TOTAL_COUNT_DTM_START: NORMAL
MDC_IDC_STAT_EPISODE_TYPE: NORMAL
MDC_IDC_STAT_TACHYTHERAPY_RECENT_DTM_END: NORMAL
MDC_IDC_STAT_TACHYTHERAPY_RECENT_DTM_START: NORMAL
MDC_IDC_STAT_TACHYTHERAPY_TOTAL_DTM_END: NORMAL
MDC_IDC_STAT_TACHYTHERAPY_TOTAL_DTM_START: NORMAL

## 2025-06-29 PROCEDURE — 93296 REM INTERROG EVL PM/IDS: CPT | Performed by: INTERNAL MEDICINE

## 2025-06-29 PROCEDURE — 93294 REM INTERROG EVL PM/LDLS PM: CPT | Performed by: INTERNAL MEDICINE

## 2025-07-05 DIAGNOSIS — J44.0 CHRONIC OBSTRUCTIVE PULMONARY DISEASE WITH ACUTE LOWER RESPIRATORY INFECTION (H): ICD-10-CM

## 2025-07-05 RX ORDER — ALBUTEROL SULFATE 90 UG/1
2 INHALANT RESPIRATORY (INHALATION) EVERY 4 HOURS PRN
Qty: 13.4 G | Refills: 5 | Status: SHIPPED | OUTPATIENT
Start: 2025-07-05

## 2025-07-10 ENCOUNTER — PATIENT OUTREACH (OUTPATIENT)
Dept: GERIATRIC MEDICINE | Facility: CLINIC | Age: 75
End: 2025-07-10
Payer: COMMERCIAL

## 2025-07-10 DIAGNOSIS — J43.9 PULMONARY EMPHYSEMA, UNSPECIFIED EMPHYSEMA TYPE (H): ICD-10-CM

## 2025-07-10 RX ORDER — BUDESONIDE AND FORMOTEROL FUMARATE DIHYDRATE 160; 4.5 UG/1; UG/1
2 AEROSOL RESPIRATORY (INHALATION) 2 TIMES DAILY
Qty: 10.2 G | Refills: 2 | Status: SHIPPED | OUTPATIENT
Start: 2025-07-10

## 2025-07-10 NOTE — PROGRESS NOTES
[facility] Care Coordination Contact      [facility] Six-Month Telephone Assessment    6 month telephone assessment completed on 7/10/2025.    ER visits: No  Hospitalizations: No  TCU stays: No  Significant health status changes: No.   Member would like in-home PT since she is unable to get out of bed. She needs to have a face to face visit with PCP. Mbe will call clinic to schedule appt and discuss with PCP.  Falls/Injuries: No  ADL/IADL changes: No  Changes in services: No  [facility]. CC will follow up.    Caregiver Assessment follow up:  N/A    Goals: See Support Plan for goal progress documentation.      Will see member in 6 months for an annual health risk assessment.   Encouraged member to call CC with any questions or concerns in the meantime.     Samantha Alexandra RN, PHN   [facility]  [phone]

## 2025-07-23 DIAGNOSIS — I48.91 ATRIAL FIBRILLATION, UNSPECIFIED TYPE (H): ICD-10-CM

## 2025-07-23 RX ORDER — APIXABAN 5 MG/1
5 TABLET, FILM COATED ORAL 2 TIMES DAILY
Qty: 180 TABLET | Refills: 2 | Status: SHIPPED | OUTPATIENT
Start: 2025-07-23

## 2025-08-21 DIAGNOSIS — B37.2 CANDIDAL INTERTRIGO: ICD-10-CM

## 2025-08-21 RX ORDER — NYSTATIN TOPICAL POWDER 100000 U/G
POWDER TOPICAL
Qty: 60 G | Refills: 0 | Status: SHIPPED | OUTPATIENT
Start: 2025-08-21

## (undated) DEVICE — SPECIMEN TRAP VACUUM SUCTION 003984-901

## (undated) DEVICE — Device

## (undated) DEVICE — INTRO TERUMO 6FRX25CM W/MARKER RSB603

## (undated) DEVICE — SHTH INTRO 0.021IN ID 6FR DIA

## (undated) DEVICE — KIT VALVE AORTIC SAPIEN 3 ULTRA RESILIA OD26 MM S3URCM26A

## (undated) DEVICE — SHEATH PRELUDE SNAP 13CM 8FR

## (undated) DEVICE — VALVE DELIVERY SYSTEM 26MM SAPIEN3 ULTRA COMMANDER 9750CM26

## (undated) DEVICE — SUCTION CANISTER MEDIVAC LINER 3000ML W/LID 65651-530

## (undated) DEVICE — MANIFOLD KIT ANGIO AUTOMATED 014613

## (undated) DEVICE — CUSTOM PACK CORONARY SAN5BCRHEA

## (undated) DEVICE — INTRO SHEATH 6FRX10CM PINNACLE RSS602

## (undated) DEVICE — DRSG GAUZE 4X4" 3033

## (undated) DEVICE — CARDIO VASC SHEET 9162

## (undated) DEVICE — INTRO SHEATH 8FRX10CM PINNACLE RSS802

## (undated) DEVICE — CATH ANGIO INFINITI PIGTAIL 155 6 SH 6FRX110CM 534654S

## (undated) DEVICE — ESU GROUND PAD ADULT REM W/15' CORD E7507DB

## (undated) DEVICE — CUSTOM PACK PERIPH VASCULAR SCV5BPVHEA

## (undated) DEVICE — CUSTOM PACK LAP CHOLE SBA5BLCHEA

## (undated) DEVICE — DRSG DRAIN 4X4" 7086

## (undated) DEVICE — SYSTEM PANNUS RETENTION 4 PAD 2 STRAP CZ-PRS-04

## (undated) DEVICE — SHEATH PRELUDE SNAP 13CM 9FR

## (undated) DEVICE — CABLE PACING ALLIGATOR CLIP 12FT 5833SL

## (undated) DEVICE — DRSG VASELINE 3X18" 8884414600

## (undated) DEVICE — TRANSDUCER W/MONITORING TRAY 42632-05

## (undated) DEVICE — CATH QUADPOLAR 6FR D6DR005RT

## (undated) DEVICE — PREP CHLORAPREP 26ML TINTED HI-LITE ORANGE 930815

## (undated) DEVICE — DECANTER VIAL 2006S

## (undated) DEVICE — CATH DIAG 4FR ANG PIG 538453S

## (undated) DEVICE — SYR ANGIOGRAPHY MULTIUSE KIT ACIST 014612

## (undated) DEVICE — PITCHER STERILE 1000ML  SSK9004A

## (undated) DEVICE — TRANSDUCER TRAY ARTERIAL 42646-06

## (undated) DEVICE — 0.035IN X 145CM STRAIGHT FIXED CORE GUIDEWIRE, TFE COATED (TSF-35-145)

## (undated) DEVICE — GUIDEWIRE FORTE FLOPPY J TOP 34949-05J

## (undated) DEVICE — GAUZE VASELINE PETRO 3 X 9 8884413605

## (undated) DEVICE — ENDO TROCAR FIRST ENTRY KII FIOS Z-THRD 11X100MM CTF33

## (undated) DEVICE — GUIDEWIRE VASC SAFARI2 0.035X275CM H74939406XS1

## (undated) DEVICE — ENDO TROCAR FIRST ENTRY KII FIOS Z-THRD 05X100MM CTF03

## (undated) DEVICE — INFLATION DEVICE ATRION QL2530

## (undated) DEVICE — SLITTER ADJSTBL 6232ADJ

## (undated) DEVICE — CATH ANGIO SUPERTORQUE AL1 6FRX100CM 532-645

## (undated) DEVICE — TUBING SUCTION MEDI-VAC 1/4"X20' N620A

## (undated) DEVICE — TUBING SUCTION DRAINAGE PLEURAL DUAL 8884714200

## (undated) DEVICE — SUCTION STRYKERFLOW II 250-070-500

## (undated) DEVICE — ELECTRODE DEFIB CADENCE 22550R

## (undated) DEVICE — TUBING SMOKE EVAC PNEUMOCLEAR HIGH FLOW 0620050250

## (undated) DEVICE — SU MONOCRYL+ 4-0 18IN PS2 UND MCP496G

## (undated) DEVICE — ESU BLADE PEAK PLASMA 3.0S PS210-030S

## (undated) DEVICE — EXCHANGE WIRE .035 260 STAR/JFC/035/260/ M001491681

## (undated) DEVICE — CONNECTOR 5 TO 1 STRL 271502

## (undated) DEVICE — CUSTOM PACK PACER ICD SAN5BPCHEA

## (undated) DEVICE — ENDO TROCAR SLEEVE KII Z-THREADED 05X100MM CTS02

## (undated) DEVICE — CATH TEMP PACING ELCTR 5FR BIPOL 007155P

## (undated) DEVICE — CATH SWAN 7FR X 110CM

## (undated) DEVICE — SOL WATER IRRIG 1000ML BOTTLE 2F7114

## (undated) DEVICE — GUIDEWIRE STR .035IN X 145CM FXCR TSF-35-145

## (undated) DEVICE — CATH ANGIO INFINITI VESTAN STAINLESS STEEL 155 D 534554S

## (undated) DEVICE — INTRO MICRO MINI STICK 4FR STIFF NITINOL 45-753

## (undated) DEVICE — CATH 5.7FRID/8.4FROD 9FR X 43CM INTRO SELECTSITE DEFLECT

## (undated) DEVICE — GLOVE BIOGEL PI SZ 6.5 40865

## (undated) DEVICE — CATH ANGIO JUDKINS R4 6FRX100CM INFINITI 534621T

## (undated) DEVICE — SHEATH GLIDE RADIAL 6FR 25CM .035

## (undated) DEVICE — GLOVE BIOGEL PI SZ 7.0 40870

## (undated) DEVICE — DRSG GAUZE 4X4" TRAY 6939

## (undated) DEVICE — SLEEVE TR BAND RADIAL COMPRESSION DEVICE 24CM TRB24-REG

## (undated) DEVICE — SUCTION MANIFOLD NEPTUNE 2 SYS 1 PORT 702-025-000

## (undated) DEVICE — KIT HAND CONTROL ACIST 014644 AR-P54

## (undated) DEVICE — BLADE KNIFE SURG 11 371111

## (undated) DEVICE — CATH DIAGNOSTIC RADIAL 5FR TIG 4.0

## (undated) DEVICE — DRSG TELFA 3X8" 1238

## (undated) DEVICE — GLOVE BIOGEL PI ORTHOPRO SZ 7.5 47675

## (undated) DEVICE — INTRO SHEATH 7FRX10CM PINNACLE RSS702

## (undated) DEVICE — WIRE GUIDE 0.035"X260CM AMPTLAZ XSTIFF CVD THSCF-35-260-3-A

## (undated) DEVICE — ADAPTER SAFE SHEATH SEALING 9FR SSSA-EW-09

## (undated) DEVICE — GUIDEWIRE LUNDERQUIST 0.035X260MM CVD TSCMG-35-260-7-LES

## (undated) DEVICE — SUTURE SILK 0 PSL 580H

## (undated) DEVICE — SHEATH GUIDE SLENDER 7FRX10CM SS 80-1070

## (undated) DEVICE — CATH THORACIC STRAIGHT CLOTSTOP 32FR

## (undated) RX ORDER — HEPARIN SODIUM 1000 [USP'U]/ML
INJECTION, SOLUTION INTRAVENOUS; SUBCUTANEOUS
Status: DISPENSED
Start: 2024-02-20

## (undated) RX ORDER — BUPIVACAINE HYDROCHLORIDE 2.5 MG/ML
INJECTION, SOLUTION EPIDURAL; INFILTRATION; INTRACAUDAL
Status: DISPENSED
Start: 2024-08-19

## (undated) RX ORDER — CEFAZOLIN SODIUM/WATER 2 G/20 ML
SYRINGE (ML) INTRAVENOUS
Status: DISPENSED
Start: 2024-02-20

## (undated) RX ORDER — PROPOFOL 10 MG/ML
INJECTION, EMULSION INTRAVENOUS
Status: DISPENSED
Start: 2024-08-19

## (undated) RX ORDER — ASPIRIN 325 MG
TABLET ORAL
Status: DISPENSED
Start: 2024-02-20

## (undated) RX ORDER — CEFAZOLIN SODIUM/WATER 2 G/20 ML
SYRINGE (ML) INTRAVENOUS
Status: DISPENSED
Start: 2024-03-07

## (undated) RX ORDER — FENTANYL CITRATE 50 UG/ML
INJECTION, SOLUTION INTRAMUSCULAR; INTRAVENOUS
Status: DISPENSED
Start: 2024-08-19

## (undated) RX ORDER — FENTANYL CITRATE 50 UG/ML
INJECTION, SOLUTION INTRAMUSCULAR; INTRAVENOUS
Status: DISPENSED
Start: 2024-02-20

## (undated) RX ORDER — FENTANYL CITRATE 50 UG/ML
INJECTION, SOLUTION INTRAMUSCULAR; INTRAVENOUS
Status: DISPENSED
Start: 2024-02-08

## (undated) RX ORDER — PROPOFOL 10 MG/ML
INJECTION, EMULSION INTRAVENOUS
Status: DISPENSED
Start: 2024-02-20

## (undated) RX ORDER — DIPHENHYDRAMINE HYDROCHLORIDE 50 MG/ML
INJECTION INTRAMUSCULAR; INTRAVENOUS
Status: DISPENSED
Start: 2024-02-20

## (undated) RX ORDER — LIDOCAINE HYDROCHLORIDE 10 MG/ML
INJECTION, SOLUTION EPIDURAL; INFILTRATION; INTRACAUDAL; PERINEURAL
Status: DISPENSED
Start: 2024-08-19

## (undated) RX ORDER — FENTANYL CITRATE-0.9 % NACL/PF 10 MCG/ML
PLASTIC BAG, INJECTION (ML) INTRAVENOUS
Status: DISPENSED
Start: 2024-08-19

## (undated) RX ORDER — FENTANYL CITRATE 50 UG/ML
INJECTION, SOLUTION INTRAMUSCULAR; INTRAVENOUS
Status: DISPENSED
Start: 2024-03-07

## (undated) RX ORDER — PHENYLEPHRINE HYDROCHLORIDE 10 MG/ML
INJECTION INTRAVENOUS
Status: DISPENSED
Start: 2024-08-19

## (undated) RX ORDER — FENTANYL CITRATE 50 UG/ML
INJECTION, SOLUTION INTRAMUSCULAR; INTRAVENOUS
Status: DISPENSED
Start: 2024-08-21

## (undated) RX ORDER — ONDANSETRON 2 MG/ML
INJECTION INTRAMUSCULAR; INTRAVENOUS
Status: DISPENSED
Start: 2024-03-07

## (undated) RX ORDER — ONDANSETRON 2 MG/ML
INJECTION INTRAMUSCULAR; INTRAVENOUS
Status: DISPENSED
Start: 2024-08-19

## (undated) RX ORDER — LIDOCAINE HYDROCHLORIDE AND EPINEPHRINE 10; 10 MG/ML; UG/ML
INJECTION, SOLUTION INFILTRATION; PERINEURAL
Status: DISPENSED
Start: 2024-02-20

## (undated) RX ORDER — LIDOCAINE HYDROCHLORIDE AND EPINEPHRINE 10; 10 MG/ML; UG/ML
INJECTION, SOLUTION INFILTRATION; PERINEURAL
Status: DISPENSED
Start: 2024-08-19

## (undated) RX ORDER — METHYLPREDNISOLONE SODIUM SUCCINATE 125 MG/2ML
INJECTION, POWDER, LYOPHILIZED, FOR SOLUTION INTRAMUSCULAR; INTRAVENOUS
Status: DISPENSED
Start: 2024-02-20

## (undated) RX ORDER — LIDOCAINE HYDROCHLORIDE 10 MG/ML
INJECTION, SOLUTION EPIDURAL; INFILTRATION; INTRACAUDAL; PERINEURAL
Status: DISPENSED
Start: 2024-02-20

## (undated) RX ORDER — PHENYLEPHRINE HYDROCHLORIDE 10 MG/ML
INJECTION INTRAVENOUS
Status: DISPENSED
Start: 2024-02-20

## (undated) RX ORDER — DEXMEDETOMIDINE HYDROCHLORIDE 4 UG/ML
INJECTION, SOLUTION INTRAVENOUS
Status: DISPENSED
Start: 2024-03-07